# Patient Record
Sex: MALE | Race: WHITE | NOT HISPANIC OR LATINO | Employment: OTHER | URBAN - METROPOLITAN AREA
[De-identification: names, ages, dates, MRNs, and addresses within clinical notes are randomized per-mention and may not be internally consistent; named-entity substitution may affect disease eponyms.]

---

## 2017-01-06 ENCOUNTER — OFFICE VISIT (OUTPATIENT)
Dept: PODIATRY | Facility: CLINIC | Age: 62
End: 2017-01-06
Payer: COMMERCIAL

## 2017-01-06 VITALS
WEIGHT: 315 LBS | BODY MASS INDEX: 49.44 KG/M2 | DIASTOLIC BLOOD PRESSURE: 69 MMHG | HEART RATE: 81 BPM | SYSTOLIC BLOOD PRESSURE: 107 MMHG | HEIGHT: 67 IN

## 2017-01-06 DIAGNOSIS — M14.60 ARTHROPATHY ASSOCIATED WITH NEUROLOGICAL DISORDER: ICD-10-CM

## 2017-01-06 DIAGNOSIS — E11.49 TYPE II DIABETES MELLITUS WITH NEUROLOGICAL MANIFESTATIONS: Primary | ICD-10-CM

## 2017-01-06 DIAGNOSIS — I89.0 OTHER LYMPHEDEMA: ICD-10-CM

## 2017-01-06 DIAGNOSIS — B35.1 ONYCHOMYCOSIS DUE TO DERMATOPHYTE: ICD-10-CM

## 2017-01-06 DIAGNOSIS — B35.3 TINEA PEDIS, UNSPECIFIED LATERALITY: ICD-10-CM

## 2017-01-06 PROCEDURE — 1159F MED LIST DOCD IN RCRD: CPT | Mod: S$GLB,,, | Performed by: PODIATRIST

## 2017-01-06 PROCEDURE — 3074F SYST BP LT 130 MM HG: CPT | Mod: S$GLB,,, | Performed by: PODIATRIST

## 2017-01-06 PROCEDURE — 3046F HEMOGLOBIN A1C LEVEL >9.0%: CPT | Mod: S$GLB,,, | Performed by: PODIATRIST

## 2017-01-06 PROCEDURE — 3078F DIAST BP <80 MM HG: CPT | Mod: S$GLB,,, | Performed by: PODIATRIST

## 2017-01-06 PROCEDURE — 2022F DILAT RTA XM EVC RTNOPTHY: CPT | Mod: S$GLB,,, | Performed by: PODIATRIST

## 2017-01-06 PROCEDURE — 4010F ACE/ARB THERAPY RXD/TAKEN: CPT | Mod: S$GLB,,, | Performed by: PODIATRIST

## 2017-01-06 PROCEDURE — 99213 OFFICE O/P EST LOW 20 MIN: CPT | Mod: S$GLB,,, | Performed by: PODIATRIST

## 2017-01-06 PROCEDURE — 99999 PR PBB SHADOW E&M-EST. PATIENT-LVL III: CPT | Mod: PBBFAC,,, | Performed by: PODIATRIST

## 2017-01-06 NOTE — PATIENT INSTRUCTIONS
Athletes Foot    Athletes foot (tinea pedis) is caused by a fungal infection in the skin. It affects the skin between the toes, causing fissures (cracks in the skin). It can also affect the bottom of the foot where it causes dry white scales and peeling of the skin. This infection is more likely to occur when the foot is in hot, sweaty socks and shoes for long periods of time. You may feel itching and burning between your toes.  This infection is treated with skin creams or oral medicine.  Home care  The following are general care guidelines:  · It is important to keep the feet dry. Use absorbent cotton socks and change them if they become sweaty. Or, you can wear an open-toe shoe or sandal. Wash the feet at least once a day with soap and water.  · Apply the antifungal cream as prescribed. Some antifungal creams are available without a prescription.  · It may take a week before the rash starts to improve. It can take about 3 to 4 weeks to completely clear. Continue the medicine until the rash is all gone.  · Use over-the-counter antifungal powders or sprays on your feet after exposure to high-risk environments (public showers, gyms, and locker rooms). This can help prevent future infections. Wearing appropriate shoes in these situations can help.  Follow-up care  Follow up with your doctor as recommended by our staff if the rash does not improve after 10 days of treatment, or if the rash continues to spread.  Prevention  The following tips may help prevent athlete's foot:  · Don't share shoes or socks with someone who has athlete's foot.  · Don't walk barefoot in places where a fungal infection can spread quickly such as locker rooms, showers, and swimming pools.  · Change socks regularly.  · Alternate shoes to assist in drying.  When to seek medical care  Get prompt medical attention if any of the following occur:  · Increasing redness or swelling of the foot  · Pus draining from cracks in the skin  · Fever of  100.4ºF (38ºC) or higher, or as directed by your health care provider  · Infection comes back soon after treatment  © 2014-0142 [employer]. [address] [place] [address], [place] [address]. All rights reserved. This information is not intended as a substitute for professional medical care. Always follow your healthcare professional's instructions.

## 2017-01-06 NOTE — MR AVS SNAPSHOT
[facility] - Podiatry  [address]  [facility] [address]  Phone: [phone]                  Jian Arrieta   2017 11:15 AM   Office Visit    Description:  Male : 1955   Provider:  Vin Hidalgo DPM   Department:  [facility] - Podiatry           Reason for Visit     Diabetes Mellitus     Diabetic Foot Exam     Routine Foot Care     Peripheral Neuropathy           Diagnoses this Visit        Comments    Type II diabetes mellitus with neurological manifestations    -  Primary     Other lymphedema         Onychomycosis due to dermatophyte         Tinea pedis, unspecified laterality         Arthropathy associated with neurological disorder                To Do List           Goals (5 Years of Data)     None      [facility]     [facility] Care [facility] -  Assistance  Registered nurses in the [facility] Center provide clinical advisement, health education, appointment booking, and other advisory services.  Call for this free service at [phone].             Medications           Message regarding Medications     Verify the changes and/or additions to your medication regime listed below are the same as discussed with your clinician today.  If any of these changes or additions are incorrect, please notify your healthcare provider.             Verify that the below list of medications is an accurate representation of the medications you are currently taking.  If none reported, the list may be blank. If incorrect, please contact your healthcare provider. Carry this list with you in case of emergency.           Current Medications     aspirin 81 MG Chew Take 81 mg by mouth once daily.    atorvastatin (LIPITOR) 80 MG tablet Take 80 mg by mouth once daily.    calcium citrate-vitamin D3 500 mg calcium -400 unit Chew Take 1 tablet by mouth 3 (three) times daily.    clopidogrel (PLAVIX) 75 mg tablet Take 75 mg by mouth once daily.    cyanocobalamin, vitamin B-12, 500 mcg Subl  "Place 1 tablet under the tongue once daily.    ezetimibe (ZETIA) 10 mg tablet Take 10 mg by mouth once daily.    glimepiride (AMARYL) 4 MG tablet Take 4 mg by mouth 2 (two) times daily. Pt was advised to hold by Endocrinologist, Dr Jaime Boo at     metoprolol tartrate (LOPRESSOR) 50 MG tablet Take 50 mg by mouth 2 (two) times daily.    nystatin (MYCOSTATIN) cream Apply topically 3 (three) times daily.    ondansetron (ZOFRAN-ODT) 8 MG TbDL Take 1 tablet (8 mg total) by mouth every 6 (six) hours as needed (nausea/vomiting).    ONETOUCH ULTRA TEST Strp 4 (four) times daily. Use to test blood sugar four times a day.    pediatric multivit-iron-min Chew Take 1 tablet by mouth 2 (two) times daily.    polyethylene glycol (GLYCOLAX) 17 gram PwPk Take 1 g by mouth once daily.    ramipril (ALTACE) 2.5 MG capsule Take 2.5 mg by mouth once daily.    ursodiol (ESTEFANIA FORTE) 500 MG tablet Take 1 tablet (500 mg total) by mouth once daily. Crush tablet and take daily.  Okay to compound into liquid at patient's request.    omeprazole (PRILOSEC) 40 MG capsule Take 1 capsule (40 mg total) by mouth once daily.           Clinical Reference Information           Vital Signs - Last Recorded  Most recent update: 1/6/2017  1:06 PM by Elyse Carpio MA    BP Pulse Ht Wt BMI    107/69 81 5' 7" (1.702 m) (!) 148.7 kg (327 lb 12.8 oz) 51.34 kg/m2      Blood Pressure          Most Recent Value    BP  107/69      Allergies as of 1/6/2017     No Known Allergies      Immunizations Administered on Date of Encounter - 1/6/2017     None      Orders Placed During Today's Visit      Normal Orders This Visit    DIABETIC SHOES FOR HOME USE       Instructions      Athletes Foot    Athletes foot (tinea pedis) is caused by a fungal infection in the skin. It affects the skin between the toes, causing fissures (cracks in the skin). It can also affect the bottom of the foot where it causes dry white scales and peeling of the skin. This infection is more " likely to occur when the foot is in hot, sweaty socks and shoes for long periods of time. You may feel itching and burning between your toes.  This infection is treated with skin creams or oral medicine.  Home care  The following are general care guidelines:  · It is important to keep the feet dry. Use absorbent cotton socks and change them if they become sweaty. Or, you can wear an open-toe shoe or sandal. Wash the feet at least once a day with soap and water.  · Apply the antifungal cream as prescribed. Some antifungal creams are available without a prescription.  · It may take a week before the rash starts to improve. It can take about 3 to 4 weeks to completely clear. Continue the medicine until the rash is all gone.  · Use over-the-counter antifungal powders or sprays on your feet after exposure to high-risk environments (public showers, gyms, and locker rooms). This can help prevent future infections. Wearing appropriate shoes in these situations can help.  Follow-up care  Follow up with your doctor as recommended by our staff if the rash does not improve after 10 days of treatment, or if the rash continues to spread.  Prevention  The following tips may help prevent athlete's foot:  · Don't share shoes or socks with someone who has athlete's foot.  · Don't walk barefoot in places where a fungal infection can spread quickly such as locker rooms, showers, and swimming pools.  · Change socks regularly.  · Alternate shoes to assist in drying.  When to seek medical care  Get prompt medical attention if any of the following occur:  · Increasing redness or swelling of the foot  · Pus draining from cracks in the skin  · Fever of 100.4ºF (38ºC) or higher, or as directed by your health care provider  · Infection comes back soon after treatment  © 4166-7147 [employer]. [address] [place] [address], [place] [address]. All rights reserved. This information is not intended as a substitute for professional medical  care. Always follow your healthcare professional's instructions.

## 2017-01-06 NOTE — PROGRESS NOTES
Subjective:      Patient ID: Jian Arrieta is a 61 y.o. male.    Chief Complaint: Diabetes Mellitus (PCP Dr. Mahmood); Diabetic Foot Exam; Routine Foot Care; and Peripheral Neuropathy    Jian is a 61 y.o. male who presents to the clinic for evaluation and treatment of high risk feet. Jian has a past medical history of Cancer; Charcot foot due to diabetes mellitus; Chest pain; Colon polyps; Coronary artery disease; Diabetes mellitus; Hydrocele; Hyperlipidemia; and Perianal cyst. The patient's chief complaint is LE edema and athletes foot, right. Notes athletes foot is slowly improving with topical antifungals.  This patient has documented high risk feet requiring routine maintenance secondary to diabetes mellitis and those secondary complications of diabetes, as mentioned. Denies history of lower extremity wounds, infections and/or injury.      PCP: Samir Mahmood MD    Date Last Seen by PCP:   Chief Complaint   Patient presents with    Diabetes Mellitus     PCP Dr. Mahmood    Diabetic Foot Exam    Routine Foot Care    Peripheral Neuropathy        Current shoe gear:  Affected Foot: Extra depth shoes with custome accommodative insoles     Unaffected Foot: Extra depth shoes with custome accommodative insoles    Hemoglobin A1C   Date Value Ref Range Status   11/13/2015 6.7 (H) 4.5 - 6.2 % Final   09/30/2015 6.7 (H) 4.5 - 6.2 % Final   07/10/2015 8.3 (H) 4.5 - 6.2 % Final       Review of Systems   Constitution: Positive for weight loss (Had gastric sleeve ). Negative for chills and fever.   Cardiovascular: Negative for leg swelling.   Respiratory: Negative for shortness of breath.    Skin: Negative for poor wound healing.   Musculoskeletal: Positive for arthritis, joint pain and joint swelling.   Gastrointestinal: Negative for nausea and vomiting.   Neurological: Positive for numbness (in feet) and paresthesias (in feet).           Objective:      Physical Exam   Constitutional: He is oriented to person, place, and  time. He appears well-developed and well-nourished. No distress.   Cardiovascular: Normal rate and intact distal pulses.    Pulses:       Dorsalis pedis pulses are 2+ on the right side, and 2+ on the left side.        Posterior tibial pulses are 1+ on the right side, and 1+ on the left side.   Toes are cool to touch. Feet are warm proximally.There is decreased digital hair. Skin is atrophic, slightly hyperpigmented, and moderately edematous.     Musculoskeletal: Normal range of motion. He exhibits edema (lymphedema noted bilaterally). He exhibits no tenderness.        Right foot: Deformity: Charcot foot.   Charcot midfoot deformity R foot, fairly stable, slight rocker bottom, however rectus position of the right foot overall.    Feet:   Right Foot:   Protective Sensation: 10 sites tested. 4 sites sensed.   Skin Integrity: Negative for ulcer, blister or skin breakdown.   Left Foot:   Protective Sensation: 10 sites tested. 4 sites sensed.   Skin Integrity: Negative for ulcer or blister.   Neurological: He is alert and oriented to person, place, and time. He exhibits normal muscle tone.   Sharp/dull sensation absent Bilaterally. Light touch absent Bilaterally.     Skin: Skin is warm and dry. No rash noted. He is not diaphoretic. No erythema. No pallor.   Mild interdigital maceration, plantar scaling and xerosis, R>L.       Psychiatric: He has a normal mood and affect. His behavior is normal. Judgment and thought content normal.   Nursing note and vitals reviewed.            Assessment:       Encounter Diagnoses   Name Primary?    Type II diabetes mellitus with neurological manifestations Yes    Other lymphedema     Onychomycosis due to dermatophyte     Tinea pedis, unspecified laterality - Right Foot     Arthropathy associated with neurological disorder    This patient has documented high risk feet requiring routine maintenance secondary to diabetes mellitis and those secondary complications of diabetes, as  mentioned.        Plan:       Jian was seen today for diabetes mellitus, diabetic foot exam, routine foot care and peripheral neuropathy.    Diagnoses and all orders for this visit:    Type II diabetes mellitus with neurological manifestations  -     DIABETIC SHOES FOR HOME USE    Other lymphedema  -     DIABETIC SHOES FOR HOME USE    Onychomycosis due to dermatophyte  -     DIABETIC SHOES FOR HOME USE    Tinea pedis, unspecified laterality - Right Foot  -     DIABETIC SHOES FOR HOME USE    Arthropathy associated with neurological disorder  -     DIABETIC SHOES FOR HOME USE    - Patient was given written and verbal instructions regarding foot condition.  I counseled the patient on his conditions, their implications and medical management.    - Shoe inspection. Diabetic Foot Education. Patient reminded of the importance of good nutrition and blood sugar control to help prevent podiatric complications of diabetes. Patient instructed on proper foot hygeine. We discussed wearing proper shoe gear, daily foot inspections, never walking without protective shoe gear, never putting sharp instruments to feet      - Nystatin topical cream prescribed for treatment of aforementioned tinea pedis. Patient will use this medication as directed in addition to thourougly drying between toes daily, and applying powder as needed    RTC 6 months, sooner if any problems arise or if toe does not improve.

## 2017-03-13 ENCOUNTER — OFFICE VISIT (OUTPATIENT)
Dept: PODIATRY | Facility: CLINIC | Age: 62
End: 2017-03-13
Payer: COMMERCIAL

## 2017-03-13 ENCOUNTER — TELEPHONE (OUTPATIENT)
Dept: PODIATRY | Facility: CLINIC | Age: 62
End: 2017-03-13

## 2017-03-13 ENCOUNTER — HOSPITAL ENCOUNTER (EMERGENCY)
Facility: HOSPITAL | Age: 62
Discharge: HOME | End: 2017-03-13
Attending: EMERGENCY MEDICINE
Payer: COMMERCIAL

## 2017-03-13 VITALS
HEIGHT: 67 IN | WEIGHT: 315 LBS | DIASTOLIC BLOOD PRESSURE: 73 MMHG | SYSTOLIC BLOOD PRESSURE: 120 MMHG | BODY MASS INDEX: 49.44 KG/M2 | HEART RATE: 89 BPM

## 2017-03-13 VITALS
HEART RATE: 84 BPM | TEMPERATURE: 98 F | WEIGHT: 315 LBS | HEIGHT: 67 IN | DIASTOLIC BLOOD PRESSURE: 70 MMHG | OXYGEN SATURATION: 99 % | SYSTOLIC BLOOD PRESSURE: 155 MMHG | BODY MASS INDEX: 49.44 KG/M2 | RESPIRATION RATE: 17 BRPM

## 2017-03-13 DIAGNOSIS — L08.9 DIABETIC FOOT INFECTION: Primary | ICD-10-CM

## 2017-03-13 DIAGNOSIS — E11.628 DIABETIC FOOT INFECTION: Primary | ICD-10-CM

## 2017-03-13 DIAGNOSIS — T14.8XXA WOUND INFECTION: ICD-10-CM

## 2017-03-13 DIAGNOSIS — L03.119 CELLULITIS OF FOOT: ICD-10-CM

## 2017-03-13 DIAGNOSIS — L08.9 WOUND INFECTION: ICD-10-CM

## 2017-03-13 DIAGNOSIS — T14.8XXA PUNCTURE WOUND: ICD-10-CM

## 2017-03-13 DIAGNOSIS — S91.332A: ICD-10-CM

## 2017-03-13 DIAGNOSIS — L08.9 UNSPECIFIED LOCAL INFECTION OF SKIN AND SUBCUTANEOUS TISSUE: ICD-10-CM
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
## 2017-03-13 LAB
ANION GAP SERPL CALC-SCNC: 6 MMOL/L
BASOPHILS # BLD AUTO: 0.02 K/UL
BASOPHILS NFR BLD: 0.2 %
BUN SERPL-MCNC: 14 MG/DL
CALCIUM SERPL-MCNC: 9.5 MG/DL
CHLORIDE SERPL-SCNC: 104 MMOL/L
CO2 SERPL-SCNC: 31 MMOL/L
CREAT SERPL-MCNC: 1.1 MG/DL
CRP SERPL-MCNC: 76.7 MG/L
DIFFERENTIAL METHOD: ABNORMAL
EOSINOPHIL # BLD AUTO: 0.2 K/UL
EOSINOPHIL NFR BLD: 2.3 %
ERYTHROCYTE [DISTWIDTH] IN BLOOD BY AUTOMATED COUNT: 13.1 %
ERYTHROCYTE [SEDIMENTATION RATE] IN BLOOD BY WESTERGREN METHOD: 45 MM/HR
EST. GFR  (AFRICAN AMERICAN): >60 ML/MIN/1.73 M^2
EST. GFR  (NON AFRICAN AMERICAN): >60 ML/MIN/1.73 M^2
GLUCOSE SERPL-MCNC: 165 MG/DL
HCT VFR BLD AUTO: 40.6 %
HGB BLD-MCNC: 13.9 G/DL
LYMPHOCYTES # BLD AUTO: 2.1 K/UL
LYMPHOCYTES NFR BLD: 25.5 %
MCH RBC QN AUTO: 30.5 PG
MCHC RBC AUTO-ENTMCNC: 34.2 %
MCV RBC AUTO: 89 FL
MONOCYTES # BLD AUTO: 0.6 K/UL
MONOCYTES NFR BLD: 7 %
NEUTROPHILS # BLD AUTO: 5.3 K/UL
NEUTROPHILS NFR BLD: 65 %
PLATELET # BLD AUTO: 229 K/UL
PMV BLD AUTO: 10.4 FL
POTASSIUM SERPL-SCNC: 4.3 MMOL/L
RBC # BLD AUTO: 4.56 M/UL
SODIUM SERPL-SCNC: 141 MMOL/L
WBC # BLD AUTO: 8.13 K/UL

## 2017-03-13 PROCEDURE — 99284 EMERGENCY DEPT VISIT MOD MDM: CPT | Mod: 25

## 2017-03-13 PROCEDURE — 85651 RBC SED RATE NONAUTOMATED: CPT

## 2017-03-13 PROCEDURE — 87070 CULTURE OTHR SPECIMN AEROBIC: CPT

## 2017-03-13 PROCEDURE — 90471 IMMUNIZATION ADMIN: CPT | Performed by: EMERGENCY MEDICINE

## 2017-03-13 PROCEDURE — 63600175 PHARM REV CODE 636 W HCPCS: Performed by: EMERGENCY MEDICINE

## 2017-03-13 PROCEDURE — 99214 OFFICE O/P EST MOD 30 MIN: CPT | Mod: 25,S$GLB,, | Performed by: PODIATRIST

## 2017-03-13 PROCEDURE — 4010F ACE/ARB THERAPY RXD/TAKEN: CPT | Mod: S$GLB,,, | Performed by: PODIATRIST

## 2017-03-13 PROCEDURE — 86140 C-REACTIVE PROTEIN: CPT

## 2017-03-13 PROCEDURE — 96365 THER/PROPH/DIAG IV INF INIT: CPT

## 2017-03-13 PROCEDURE — 87075 CULTR BACTERIA EXCEPT BLOOD: CPT

## 2017-03-13 PROCEDURE — 99999 PR PBB SHADOW E&M-EST. PATIENT-LVL V: CPT | Mod: PBBFAC,,, | Performed by: PODIATRIST

## 2017-03-13 PROCEDURE — 3074F SYST BP LT 130 MM HG: CPT | Mod: S$GLB,,, | Performed by: PODIATRIST

## 2017-03-13 PROCEDURE — 25000003 PHARM REV CODE 250: Performed by: EMERGENCY MEDICINE

## 2017-03-13 PROCEDURE — 3046F HEMOGLOBIN A1C LEVEL >9.0%: CPT | Mod: S$GLB,,, | Performed by: PODIATRIST

## 2017-03-13 PROCEDURE — 2022F DILAT RTA XM EVC RTNOPTHY: CPT | Mod: S$GLB,,, | Performed by: PODIATRIST

## 2017-03-13 PROCEDURE — 85025 COMPLETE CBC W/AUTO DIFF WBC: CPT

## 2017-03-13 PROCEDURE — 96366 THER/PROPH/DIAG IV INF ADDON: CPT

## 2017-03-13 PROCEDURE — 99284 EMERGENCY DEPT VISIT MOD MDM: CPT | Mod: ,,, | Performed by: EMERGENCY MEDICINE

## 2017-03-13 PROCEDURE — 10060 I&D ABSCESS SIMPLE/SINGLE: CPT | Mod: S$GLB,,, | Performed by: PODIATRIST

## 2017-03-13 PROCEDURE — 80048 BASIC METABOLIC PNL TOTAL CA: CPT

## 2017-03-13 PROCEDURE — 3078F DIAST BP <80 MM HG: CPT | Mod: S$GLB,,, | Performed by: PODIATRIST

## 2017-03-13 PROCEDURE — 90715 TDAP VACCINE 7 YRS/> IM: CPT | Performed by: EMERGENCY MEDICINE

## 2017-03-13 PROCEDURE — 1160F RVW MEDS BY RX/DR IN RCRD: CPT | Mod: S$GLB,,, | Performed by: PODIATRIST

## 2017-03-13 RX ORDER — HYDROCODONE BITARTRATE AND ACETAMINOPHEN 5; 325 MG/1; MG/1
1 TABLET ORAL EVERY 6 HOURS PRN
Status: ON HOLD | COMMUNITY
End: 2019-01-28

## 2017-03-13 RX ORDER — CIPROFLOXACIN 500 MG/1
500 TABLET ORAL 2 TIMES DAILY
COMMUNITY
End: 2017-04-02

## 2017-03-13 RX ORDER — SULFAMETHOXAZOLE AND TRIMETHOPRIM 800; 160 MG/1; MG/1
1 TABLET ORAL 2 TIMES DAILY
Qty: 14 TABLET | Refills: 0 | Status: SHIPPED | OUTPATIENT
Start: 2017-03-13 | End: 2017-03-20

## 2017-03-13 RX ADMIN — VANCOMYCIN HYDROCHLORIDE 2000 MG: 100 INJECTION, POWDER, LYOPHILIZED, FOR SOLUTION INTRAVENOUS at 01:03

## 2017-03-13 RX ADMIN — CLOSTRIDIUM TETANI TOXOID ANTIGEN (FORMALDEHYDE INACTIVATED), CORYNEBACTERIUM DIPHTHERIAE TOXOID ANTIGEN (FORMALDEHYDE INACTIVATED), BORDETELLA PERTUSSIS TOXOID ANTIGEN (GLUTARALDEHYDE INACTIVATED), BORDETELLA PERTUSSIS FILAMENTOUS HEMAGGLUTININ ANTIGEN (FORMALDEHYDE INACTIVATED), BORDETELLA PERTUSSIS PERTACTIN ANTIGEN, AND BORDETELLA PERTUSSIS FIMBRIAE 2/3 ANTIGEN 0.5 ML: 5; 2; 2.5; 5; 3; 5 INJECTION, SUSPENSION INTRAMUSCULAR at 03:03

## 2017-03-13 NOTE — DISCHARGE INSTRUCTIONS
Wound Check, Infection  You have a wound that has become infected. The wound will not heal properly unless the infection is cleared. Infection in a wound may also spread if it is not treated. In most cases, antibiotic medicines are prescribed to treat a wound infection.   Symptoms of a wound infection include:  · Redness or swelling around the wound  · Warmth coming from the wound  · New or worsening pain  · Red streaks around the wound  · Draining pus  · Fever  Home care  Follow all directions you are given to treat the infection.  Medicines  Take all medicines as prescribed.   · If you were given antibiotics, take them until they are gone or your healthcare provider tells you to stop. It is vital to finish the antibiotics even if you feel better. If you do not finish them, the infection may come back and be harder to treat.  · If your infection is not responding to the medicines you are taking, you may be prescribed new medicines.  · Take medicine for pain as directed by your healthcare provider.  Wound care  Care for your wound as directed by your healthcare provider.  · Apply a warm compress (clean cloth soaked in hot water) to the infected area for about 5 to 10 minutes at a time. Be very careful not to burn yourself. Test the cloth on a non-infected area to make sure it is not too hot.  · Continue to change the dressing daily. If it becomes wet, stained with wound fluid, or dirty, change it sooner. To change it:  ¨ Wash your hands with soap and water before changing the dressing.  ¨ Carefully remove the dressing and tape. If it sticks to the wound, you may need to wet it a little to remove it. (Do not do this if your healthcare provider has told you not to.)  ¨ Gently clean the wound with clean water (or saline) using gauze, a clean washcloth, or cotton swab.  ¨ Do not use soap, alcohol, peroxide or other cleansers.  ¨ If you were told to dry the wound before putting on a new dressing, gently pat. Do not  rub.  ¨ Throw out the old dressing.  ¨ Wash your hands again before opening the new, clean dressing.  ¨ Wash your hands again when you are done.  Follow-up care  Follow up with your healthcare provider as advised. If a culture was done, you will be notified if your treatment needs to change. Call as directed for the results.  When to seek medical advice  Call your health care provider right away if any of these occur:  · Symptoms of infection don't start to improve within 2 days of starting antibiotics  · Symptoms of infection get worse  · New symptoms, such as red streaks around the wound  · Fever of 100.4°F (38.0°C) or higher for more than 2 days after starting the antibiotics  Date Last Reviewed: 8/10/2015  © 3118-9094 [employer]. [address] [place] [address], [place] [address]. All rights reserved. This information is not intended as a substitute for professional medical care. Always follow your healthcare professional's instructions.

## 2017-03-13 NOTE — TELEPHONE ENCOUNTER
----- Message from Mitzi Gatica sent at 3/10/2017  2:36 PM CST -----  Contact: PT  Would like to schedule an appointment, for a puncture wound in left foot. He is a diabetic patient, and states he is very concerned.    He would like to see Dr. Hidalgo, the next appointment was 3/20/17, he need to be seen sooner. He would like to come in 3/13/17, if possible.     Call: [phone]

## 2017-03-13 NOTE — ED PROVIDER NOTES
"Encounter Date: 3/13/2017    SCRIBE #1 NOTE: I, Annmarie Schmidt, am scribing for, and in the presence of, Dr. Badillo.       History     Chief Complaint   Patient presents with    Wound Infection     stepped on nail friday, seen in podiatry and sent here for iv antibiotics     Review of patient's allergies indicates:  No Known Allergies  HPI Comments: Time seen by provider: 12:53 PM    This is a 61 y.o. Male with a history of hyperlipidemia, diabetes, and charcot foot due to diabetes who presents with complaint of wound infection in left foot for 3 days after stepping on a carpet nail.  The patient indicates that his physician recently "drained" the wound.  The patient also indicates that he had a spot of skin cancer removed on his right ear 4 days ago.    The history is provided by the patient.     Past Medical History:   Diagnosis Date    Cancer     skin cancer    Charcot foot due to diabetes mellitus     Charcot foot due to diabetes mellitus     Chest pain     Colon polyps     approx 5 yrs ago    Coronary artery disease     Diabetes mellitus     Hydrocele     approx 1.5 yrs ago    Hyperlipidemia     Lymphedema     Perianal cyst     approx 2 yrs ago     Past Surgical History:   Procedure Laterality Date    ANGIOPLASTY      total x5 stents    CARDIAC SURGERY      COLONOSCOPY N/A 10/6/2015    Procedure: COLONOSCOPY;  Surgeon: Shekhar Richards MD;  Location: [facility] ([address] [place] [address]);  Service: Endoscopy;  Laterality: N/A;  BMI over 55/2nd floor case    5 day hold Plavix, Dr Kwadwo Arroyo    CYST REMOVAL      GASTRECTOMY      KNEE ARTHROSCOPY      perianal surgery      perianal cyst removed     Family History   Problem Relation Age of Onset    Cancer Mother     Cancer Father     Obesity Sister     Parkinsonism Brother     No Known Problems Paternal Grandmother     Cancer Paternal Grandfather     Cancer Brother     Diabetes Maternal Grandmother     Stroke Maternal Grandfather      Social History "   Substance Use Topics    Smoking status: Former Smoker     Packs/day: 2.00     Years: 30.00     Types: Cigarettes     Quit date: 2/1/2005    Smokeless tobacco: Never Used    Alcohol use 0.0 - 0.6 oz/week     0 - 1 Shots of liquor per week     Review of Systems   Constitutional: Negative for fever.   HENT: Negative for sore throat.    Eyes: Negative for visual disturbance.   Respiratory: Negative for shortness of breath.    Cardiovascular: Negative for chest pain.   Gastrointestinal: Negative for abdominal pain.   Genitourinary: Negative for dysuria.   Musculoskeletal: Negative for back pain.   Skin: Positive for wound (Wound infection on left foot.).        Left foot and right ear bandaged.   Neurological: Negative for headaches.       Physical Exam   Initial Vitals   BP Pulse Resp Temp SpO2   03/13/17 1133 03/13/17 1133 03/13/17 1133 03/13/17 1133 03/13/17 1133   141/91 84 18 97.5 °F (36.4 °C) 98 %     Physical Exam    Nursing note and vitals reviewed.  Constitutional: He appears well-developed and well-nourished. He is not diaphoretic. No distress.   The patient appears non-toxic.   HENT:   Head: Normocephalic and atraumatic.   Mouth/Throat: Oropharynx is clear and moist.   Eyes: Conjunctivae and EOM are normal. Pupils are equal, round, and reactive to light.   Neck: Normal range of motion. Neck supple. No JVD present.   Cardiovascular: Normal rate, regular rhythm and normal heart sounds.   No murmur heard.  Pulmonary/Chest: Breath sounds normal. No respiratory distress. He has no wheezes. He has no rhonchi. He has no rales.   Abdominal: Soft. Bowel sounds are normal. He exhibits no distension and no mass. There is no tenderness. There is no rebound and no guarding.   Musculoskeletal: Normal range of motion. He exhibits edema (The patient has chronic edema to legs.). He exhibits no tenderness.   Extremities are atraumatic.   Neurological: He is alert and oriented to person, place, and time. No cranial nerve  deficit or sensory deficit.   Skin: Skin is warm and dry. No rash noted.   Right ear and left foot are bandaged.   Psychiatric: He has a normal mood and affect.         ED Course   Procedures  Labs Reviewed   SEDIMENTATION RATE, MANUAL - Abnormal; Notable for the following:        Result Value    Sed Rate 45 (*)     All other components within normal limits   C-REACTIVE PROTEIN - Abnormal; Notable for the following:     CRP 76.7 (*)     All other components within normal limits   CBC W/ AUTO DIFFERENTIAL - Abnormal; Notable for the following:     RBC 4.56 (*)     Hemoglobin 13.9 (*)     All other components within normal limits   BASIC METABOLIC PANEL - Abnormal; Notable for the following:     CO2 31 (*)     Glucose 165 (*)     Anion Gap 6 (*)     All other components within normal limits             Medical Decision Making:   History:   Old Medical Records: I decided to obtain old medical records.  Old Records Summarized: other records.       <> Summary of Records: The patient was evaluated in Podiatry clinic with a puncture wound to his foot on a carpet nail last Friday.  He has been on Cipro.   Initial Assessment:   Will obtain X-ray and labs, will give a dose of Vancomycin, and will discuss with Podiatry after results.  Clinical Tests:   Lab Tests: Ordered and Reviewed  Radiological Study: Ordered and Reviewed  Other:   I have discussed this case with another health care provider.            Scribe Attestation:   Scribe #1: I performed the above scribed service and the documentation accurately describes the services I performed. I attest to the accuracy of the note.    Attending Attestation:           Physician Attestation for Scribe:  Physician Attestation Statement for Scribe #1: I, Dr. Badillo, reviewed documentation, as scribed by Annmarie Schmidt in my presence, and it is both accurate and complete.         Attending ED Notes:   4:46 PM  I discussed with Dr. Hidalgo with Podiatry who reviewed charts and  recommends discharge home on Bactrim.           ED Course     Clinical Impression:   The encounter diagnosis was Wound infection.    Disposition:   Disposition: Discharged  Condition: Stable       Aakash Badillo MD  03/15/17 1929       Aakash Badillo MD  03/15/17 1929

## 2017-03-13 NOTE — PATIENT INSTRUCTIONS
Puncture Wound: Foot       A puncture wound occurs when a pointed object (such as a nail) pushes into the skin. It may go into the tissues below the skin of the foot, including fat and muscle. This type of wound is narrow and deep. They can be hard to clean. Puncture wounds are at high risk for becoming infected. One type of serious infection is more likely if you were wearing a rubber-soled shoe at the time of injury. Bacteria from the sole of the shoe may be dragged into the wound. Symptoms of infection may appear as late as 2 to 3 weeks after the injury. Be sure to watch for symptoms of infection and call your healthcare provider right away if any them appear.  X-rays may be done to see whether any objects remain under the skin. Your may also need a tetanus shot. This is given if you are not up to date on this vaccination and the object that caused the wound may lead to tetanus.  Puncture wounds can easily become infected.   Home care  · When you sit or lie down, raise the foot above the level of your heart. This helps reduce swelling and pain.  · Do not put weight on the injured foot if it hurts to do so or if you were told to keep weight off the injury.  · Your healthcare provider may prescribe an antibiotic. This is to help prevent infection. Follow all instructions for taking this medicine. Take the medicine every day until it is gone or you are told to stop. You should not have any left over.  · The healthcare provider may prescribe medicines for pain. Follow instructions for taking them.  · You can take acetaminophen or ibuprofen for pain, unless you were given a different pain medicine to use.   · Follow the healthcare providers instructions on how to care for the wound.  · Keep the wound clean and dry. Do not get the wound wet until you are told it is okay to do so. If the area gets wet, gently pat it dry with a clean cloth. Replace the wet bandage with a dry one.  · If a bandage was applied and it  becomes wet or dirty, replace it. Otherwise, leave it in place for the first 24 hours.  · Once you can get the wound wet, you may shower as usual but do not soak the wound in water (no tub baths or swimming)  · Check the wound daily for symptoms of infection. These include:  ¨ Increasing redness or swelling around the wound  ¨ Increased warmth of the wound  ¨ Worsening pain  ¨ Red streaking lines away from the wound  ¨ Draining pus  Follow-up care  Follow up with your healthcare provider as advised.   When to seek medical advice  Call your healthcare provider right away if any of these occur:  · Any symptoms of infection (listed above)  · Fever of 100.4°F (38.ºC) or higher, or as directed by your healthcare provider  · Wound changes colors  · Numbness around the wound  · Decreased movement around the injured area  Date Last Reviewed: 8/24/2015  © 6813-0720 [employer]. [address] [place] [address]. All rights reserved. This information is not intended as a substitute for professional medical care. Always follow your healthcare professional's instructions.        Wound Check, Infection  You have a wound that has become infected. The wound will not heal properly unless the infection is cleared. Infection in a wound may also spread if it is not treated. In most cases, antibiotic medicines are prescribed to treat a wound infection.   Symptoms of a wound infection include:  · Redness or swelling around the wound  · Warmth coming from the wound  · New or worsening pain  · Red streaks around the wound  · Draining pus  · Fever  Home care  Follow all directions you are given to treat the infection.  Medicines  Take all medicines as prescribed.   · If you were given antibiotics, take them until they are gone or your healthcare provider tells you to stop. It is vital to finish the antibiotics even if you feel better. If you do not finish them, the infection may come back and be harder to treat.  · If  your infection is not responding to the medicines you are taking, you may be prescribed new medicines.  · Take medicine for pain as directed by your healthcare provider.  Wound care  Care for your wound as directed by your healthcare provider.  · Apply a warm compress (clean cloth soaked in hot water) to the infected area for about 5 to 10 minutes at a time. Be very careful not to burn yourself. Test the cloth on a non-infected area to make sure it is not too hot.  · Continue to change the dressing daily. If it becomes wet, stained with wound fluid, or dirty, change it sooner. To change it:  ¨ Wash your hands with soap and water before changing the dressing.  ¨ Carefully remove the dressing and tape. If it sticks to the wound, you may need to wet it a little to remove it. (Do not do this if your healthcare provider has told you not to.)  ¨ Gently clean the wound with clean water (or saline) using gauze, a clean washcloth, or cotton swab.  ¨ Do not use soap, alcohol, peroxide or other cleansers.  ¨ If you were told to dry the wound before putting on a new dressing, gently pat. Do not rub.  ¨ Throw out the old dressing.  ¨ Wash your hands again before opening the new, clean dressing.  ¨ Wash your hands again when you are done.  Follow-up care  Follow up with your healthcare provider as advised. If a culture was done, you will be notified if your treatment needs to change. Call as directed for the results.  When to seek medical advice  Call your health care provider right away if any of these occur:  · Symptoms of infection don't start to improve within 2 days of starting antibiotics  · Symptoms of infection get worse  · New symptoms, such as red streaks around the wound  · Fever of 100.4°F (38.0°C) or higher for more than 2 days after starting the antibiotics  Date Last Reviewed: 8/10/2015  © 3276-9054 [employer]. [address] [place] [address], [place] [address]. All rights reserved. This information is not  intended as a substitute for professional medical care. Always follow your healthcare professional's instructions.        Discharge Instructions: Packing a Wound  Your healthcare provider wants you to care for a special dressing, or packing, in your wound. When a wound is deep, or when it tunnels under the skin, packing the wound can help it heal. The packing material soaks up any drainage from the wound, which helps the tissues heal from the inside out. Without the packing, the wound could close at the top. This would trap fluid and possibly bacteria in the deeper areas of the wound and lead to infection and impede healing. You were shown how to pack your wound before you left the hospital. The following guidelines will help you remember how to take care of your wound.  Gather your supplies  Keep your supplies all in 1 place. Put them in a basket or large bag. You will need the following:  · Packing material  · Sterile wetting solution  · Sterile gloves  · A clean bowl  · Scissors  · A clean towel  · Outer dressing material (a bandage to put on the top of the wound after you have packed it)  · Tape  · Cotton swabs  · A small plastic bag  Clean up  · Clean the area where you will set out your dressing supplies.  · Wash your hands thoroughly with soap and water.  · Put a clean towel over the area and set a clean bowl on it. Dont touch the inside of the bowl.  Prepare the packing material  · Pour enough wetting solution into the clean bowl to wet the packing material.  · Cut off a length of packing material and drop it carefully into the bowl of wetting solution. (Remember, the amount of packing material needed to fill the wound should become less and less as the area heals. You will need to adjust the length of the packing material over time.)  · Cut pieces of tape to desired lengths. You will use these strips to secure your outer dressing. For now, hang the pieces of tape on the edge of your work surface.  · Gently  remove your existing bandage (old tape, outer dressing, and packing). Put these items in a small plastic bag for disposal.  Pack the wound  · Wash your hands thoroughly again. Use soap and water.  · Put on the gloves. Gently squeeze the packing material to get rid of excess wetting solution. The packing material should be wet, but not dripping.  · Gently put the packing material into the wound. Packing should fill the wound space completely, but not tightly. Use a cotton swab to gently guide the packing into small or tunneled areas.  · Open your outer dressing material and place it on the towel. Keep it away from the bowl, and dont get it wet.  · Put the outer dressing over the packing and wound site.  · Tape the outer dressing in place.  · Remove your gloves.  · Wash your hands 1 more time with soap and water.  Follow-up  Make a follow-up appointment. It is recommended that you be seen within 48 hours after the first time a packing is placed.     When to call your healthcare provider  Call your healthcare provider right away if you have any of the following:  · Increased drainage from the wound  · Redness in or around the wound  · Wound tissue that changes from pink to white, yellow, or black in color  · Odor coming from the wound  · Increased size or depth of the wound  · Fever above 100.4°F  (38°C) or shaking chills   Date Last Reviewed: 8/6/2015  © 8352-8816 [employer]. [address] [place] [address], [place] [address]. All rights reserved. This information is not intended as a substitute for professional medical care. Always follow your healthcare professional's instructions.

## 2017-03-13 NOTE — ED TRIAGE NOTES
"Pt states he stepped on a nail on Friday, has been trying to go to the doctor, but has been unable to. He also recently had ear surgery he states "is not getting any bernardo." Ear +pain, hotness. No pain in foot (neuropathy).  "

## 2017-03-13 NOTE — ED AVS SNAPSHOT
[facility]  [address] [facility] [address]               Jian Arrieta   3/13/2017 12:43 PM   ED    Description:  Male : 1955   Department:  [facility]           Your Care was Coordinated By:     Provider Role From To    Aakash Badillo MD Attending Provider 17 1247 --      Reason for Visit     Wound Infection           Diagnoses this Visit        Comments    Wound infection           ED Disposition     None           To Do List           Follow-up Information     Follow up with Vin Hidalgo DPM In 1 week.    Specialty:  Podiatry    Contact information:    [address] [facility] [address]  [phone]          Follow up with [facility].    Specialty:  Emergency Medicine    Why:  If symptoms worsen    Contact information:    [facility] [facility] [address] [phone]       These Medications        Disp Refills Start End    sulfamethoxazole-trimethoprim 800-160mg (BACTRIM DS) 800-160 mg Tab 14 tablet 0 3/13/2017 3/20/2017    Take 1 tablet by mouth 2 (two) times daily. - Oral    Pharmacy: [facility]/pharmacy #[id number]  [address] #: [phone]         [facility]     [facility] - 24/7 Assistance  Registered nurses in the [facility] provide clinical advisement, health education, appointment booking, and other advisory services.  Call for this free service at [phone].             Medications           Message regarding Medications     Verify the changes and/or additions to your medication regime listed below are the same as discussed with your clinician today.  If any of these changes or additions are incorrect, please notify your healthcare provider.        START taking these NEW medications        Refills    sulfamethoxazole-trimethoprim 800-160mg (BACTRIM DS) 800-160 mg Tab 0    Sig: Take 1 tablet by mouth 2 (two)  times daily.    Class: Print    Route: Oral      These medications were administered today        Dose Freq    vancomycin (VANCOCIN) 2,000 mg in dextrose 5 % 500 mL IVPB 15 mg/kg × 148 kg ED 1 Time    Sig: Inject 2,000 mg into the vein ED 1 Time.    Class: Normal    Route: Intravenous    Tdap vaccine injection 0.5 mL 0.5 mL Once    Sig: Inject 0.5 mLs into the muscle once.    Class: Normal    Route: Intramuscular           Verify that the below list of medications is an accurate representation of the medications you are currently taking.  If none reported, the list may be blank. If incorrect, please contact your healthcare provider. Carry this list with you in case of emergency.           Current Medications     aspirin 81 MG Chew Take 81 mg by mouth once daily.    atorvastatin (LIPITOR) 80 MG tablet Take 80 mg by mouth once daily.    calcium citrate-vitamin D3 500 mg calcium -400 unit Chew Take 1 tablet by mouth 3 (three) times daily.    ciprofloxacin HCl (CIPRO) 500 MG tablet Take 500 mg by mouth 2 (two) times daily.    clopidogrel (PLAVIX) 75 mg tablet Take 75 mg by mouth once daily.    cyanocobalamin, vitamin B-12, 500 mcg Subl Place 1 tablet under the tongue once daily.    ezetimibe (ZETIA) 10 mg tablet Take 10 mg by mouth once daily.    glimepiride (AMARYL) 4 MG tablet Take 4 mg by mouth 2 (two) times daily. Pt was advised to hold by Endocrinologist, Dr Jaime Boo at     hydrocodone-acetaminophen 5-325mg (NORCO) 5-325 mg per tablet Take 1 tablet by mouth every 6 (six) hours as needed for Pain.    metoprolol tartrate (LOPRESSOR) 50 MG tablet Take 50 mg by mouth 2 (two) times daily.    pediatric multivit-iron-min Chew Take 1 tablet by mouth 2 (two) times daily.    polyethylene glycol (GLYCOLAX) 17 gram PwPk Take 1 g by mouth once daily.    ramipril (ALTACE) 2.5 MG capsule Take 2.5 mg by mouth once daily.    nystatin (MYCOSTATIN) cream Apply topically 3 (three) times daily.    omeprazole (PRILOSEC) 40 MG  "capsule Take 1 capsule (40 mg total) by mouth once daily.    ondansetron (ZOFRAN-ODT) 8 MG TbDL Take 1 tablet (8 mg total) by mouth every 6 (six) hours as needed (nausea/vomiting).    ONETOUCH ULTRA TEST Strp 4 (four) times daily. Use to test blood sugar four times a day.    sulfamethoxazole-trimethoprim 800-160mg (BACTRIM DS) 800-160 mg Tab Take 1 tablet by mouth 2 (two) times daily.    ursodiol (ESTEFANIA FORTE) 500 MG tablet Take 1 tablet (500 mg total) by mouth once daily. Crush tablet and take daily.  Okay to compound into liquid at patient's request.           Clinical Reference Information           Your Vitals Were     BP Pulse Temp Resp Height Weight    141/91 84 97.5 °F (36.4 °C) (Oral) 18 5' 7" (1.702 m) 148 kg (326 lb 4.5 oz)    SpO2 BMI             98% 51.1 kg/m2         Allergies as of 3/13/2017     No Known Allergies      Immunizations Administered on Date of Encounter - 3/13/2017     Name Date Dose VIS Date Route    TDAP 3/13/2017 0.5 mL 2/24/2015 Intramuscular      ED Micro, Lab, POCT     Start Ordered       Status Ordering Provider    03/13/17 1301 03/13/17 1301  Sedimentation rate, manual  STAT      Final result     03/13/17 1301 03/13/17 1301  C-reactive protein  STAT      Final result     03/13/17 1301 03/13/17 1301  CBC auto differential  STAT      Edited Result - FINAL     03/13/17 1301 03/13/17 1301  Basic metabolic panel  STAT      Final result       ED Imaging Orders     Start Ordered       Status Ordering Provider    03/13/17 1301 03/13/17 1301  X-Ray Foot Complete Left  1 time imaging      Final result         Discharge Instructions         Wound Check, Infection  You have a wound that has become infected. The wound will not heal properly unless the infection is cleared. Infection in a wound may also spread if it is not treated. In most cases, antibiotic medicines are prescribed to treat a wound infection.   Symptoms of a wound infection include:  · Redness or swelling around the " wound  · Warmth coming from the wound  · New or worsening pain  · Red streaks around the wound  · Draining pus  · Fever  Home care  Follow all directions you are given to treat the infection.  Medicines  Take all medicines as prescribed.   · If you were given antibiotics, take them until they are gone or your healthcare provider tells you to stop. It is vital to finish the antibiotics even if you feel better. If you do not finish them, the infection may come back and be harder to treat.  · If your infection is not responding to the medicines you are taking, you may be prescribed new medicines.  · Take medicine for pain as directed by your healthcare provider.  Wound care  Care for your wound as directed by your healthcare provider.  · Apply a warm compress (clean cloth soaked in hot water) to the infected area for about 5 to 10 minutes at a time. Be very careful not to burn yourself. Test the cloth on a non-infected area to make sure it is not too hot.  · Continue to change the dressing daily. If it becomes wet, stained with wound fluid, or dirty, change it sooner. To change it:  ¨ Wash your hands with soap and water before changing the dressing.  ¨ Carefully remove the dressing and tape. If it sticks to the wound, you may need to wet it a little to remove it. (Do not do this if your healthcare provider has told you not to.)  ¨ Gently clean the wound with clean water (or saline) using gauze, a clean washcloth, or cotton swab.  ¨ Do not use soap, alcohol, peroxide or other cleansers.  ¨ If you were told to dry the wound before putting on a new dressing, gently pat. Do not rub.  ¨ Throw out the old dressing.  ¨ Wash your hands again before opening the new, clean dressing.  ¨ Wash your hands again when you are done.  Follow-up care  Follow up with your healthcare provider as advised. If a culture was done, you will be notified if your treatment needs to change. Call as directed for the results.  When to seek medical  advice  Call your health care provider right away if any of these occur:  · Symptoms of infection don't start to improve within 2 days of starting antibiotics  · Symptoms of infection get worse  · New symptoms, such as red streaks around the wound  · Fever of 100.4°F (38.0°C) or higher for more than 2 days after starting the antibiotics  Date Last Reviewed: 8/10/2015  © 5753-5261 [employer]. [address] [place] [address], [place] [address]. All rights reserved. This information is not intended as a substitute for professional medical care. Always follow your healthcare professional's instructions.           [facility] complies with applicable Federal civil rights laws and does not discriminate on the basis of race, color, national origin, age, disability, or sex.        Language Assistance Services     ATTENTION: Language assistance services are available, free of charge. Please call [phone].      ATENCIÓN: Si habla español, tiene a galvez disposición servicios gratuitos de asistencia lingüística. Llame al [phone].     CHÚ Ý: N?u b?n nói Ti?ng Vi?t, có các d?ch v? h? tr? ngôn ng? mi?n phí dành cho b?n. G?i s? [phone].

## 2017-03-13 NOTE — PROGRESS NOTES
Subjective:      Patient ID: Jian Arrieta is a 61 y.o. male.    Chief Complaint: Diabetes Mellitus; Foot Ulcer (left ft.); and Foot Problem    Jian is a 61 y.o. male who presents to the clinic for evaluation and treatment of high risk feet. Jian has a past medical history of Cancer; Charcot foot due to diabetes mellitus; Chest pain; Colon polyps; Coronary artery disease; Diabetes mellitus; Hydrocele; Hyperlipidemia; and Perianal cyst.    This patient has documented high risk feet requiring routine maintenance secondary to diabetes mellitis and those secondary complications of diabetes, as mentioned. The patient's chief complaint is puncture wound with nail that occurred last Thursday. Small nail was attached to some carpet in his home. He is taking cipro PO per derm. Does not remember last tetanus.     PCP: Samir Mahmood MD    Date Last Seen by PCP:   Chief Complaint   Patient presents with    Diabetes Mellitus    Foot Ulcer     left ft.    Foot Problem        Current shoe gear:  Affected Foot: Extra depth shoes with custome accommodative insoles     Unaffected Foot: Extra depth shoes with custome accommodative insoles    Hemoglobin A1C   Date Value Ref Range Status   11/13/2015 6.7 (H) 4.5 - 6.2 % Final   09/30/2015 6.7 (H) 4.5 - 6.2 % Final   07/10/2015 8.3 (H) 4.5 - 6.2 % Final       Review of Systems   Constitution: Positive for weight loss (Had gastric sleeve ). Negative for chills and fever.   Cardiovascular: Negative for leg swelling.   Respiratory: Negative for shortness of breath.    Skin: Negative for poor wound healing.   Musculoskeletal: Positive for arthritis, joint pain and joint swelling.   Gastrointestinal: Negative for nausea and vomiting.   Neurological: Positive for numbness (in feet) and paresthesias (in feet).           Objective:      Physical Exam   Constitutional: He is oriented to person, place, and time. He appears well-developed and well-nourished. No distress.    Cardiovascular: Normal rate and intact distal pulses.    Pulses:       Dorsalis pedis pulses are 2+ on the right side, and 2+ on the left side.        Posterior tibial pulses are 1+ on the right side, and 1+ on the left side.   Toes are cool to touch. Feet are warm proximally.There is decreased digital hair. Skin is atrophic, slightly hyperpigmented, and moderately edematous.     Musculoskeletal: Normal range of motion. He exhibits edema (lymphedema noted bilaterally). He exhibits no tenderness.        Right foot: Deformity: Charcot foot.   Charcot midfoot deformity R foot, fairly stable, slight rocker bottom, however rectus position of the right foot overall.    Feet:   Right Foot:   Protective Sensation: 10 sites tested. 4 sites sensed.   Skin Integrity: Negative for ulcer, blister or skin breakdown.   Left Foot:   Protective Sensation: 10 sites tested. 4 sites sensed.   Skin Integrity: Negative for ulcer or blister.   Neurological: He is alert and oriented to person, place, and time. He exhibits normal muscle tone.   Sharp/dull sensation absent Bilaterally. Light touch absent Bilaterally.     Skin: Skin is warm and dry. No rash noted. He is not diaphoretic. No erythema. No pallor.   Puncture wound at left plantar 2nd MTPJ, probes to joint capsule.    Serous drainage  Erythema and edema to midfoot.       Psychiatric: He has a normal mood and affect. His behavior is normal. Judgment and thought content normal.   Nursing note and vitals reviewed.            Assessment:       Encounter Diagnoses   Name Primary?    Diabetic foot infection - Left Foot Yes    Puncture wound - Left Foot     Cellulitis of foot - Left Foot    This patient has documented high risk feet requiring routine maintenance secondary to diabetes mellitis and those secondary complications of diabetes, as mentioned.        Plan:       Jian was seen today for diabetes mellitus, foot ulcer and foot problem.    Diagnoses and all orders for this  visit:    Diabetic foot infection - Left Foot  -     Aerobic culture  -     Culture, Anaerobic  -     X-Ray Foot Complete Left; Future    Puncture wound - Left Foot  -     Aerobic culture  -     Culture, Anaerobic  -     X-Ray Foot Complete Left; Future    Cellulitis of foot - Left Foot    - Patient was given written and verbal instructions regarding foot condition.  I counseled the patient on his conditions, their implications and medical management.    - Patient agrees to report to ED for tetanus, IV abx, blood work (ESR, CRP, CBC), xray.  - Understands he may need to be admitted for IV antibiotics and possible surgical I&D if there is any signs of bone infection or abscess upon further exam.    - Local I&D performed. No purulence noted. Wound irrigated and packed open. Offload with surgical shoe.      - Shoe inspection. Diabetic Foot Education. Patient reminded of the importance of good nutrition and blood sugar control to help prevent podiatric complications of diabetes. Patient instructed on proper foot hygeine. We discussed wearing proper shoe gear, daily foot inspections, never walking without protective shoe gear, never putting sharp instruments to feet

## 2017-03-13 NOTE — PROCEDURES
"Incision and Drainage  Date/Time: 3/13/2017 11:06 AM  Performed by: JUANA NAVARRETE  Authorized by: JUANA NAVARRETE     Time out: Immediately prior to procedure a "time out" was called to verify the correct patient, procedure, equipment, support staff and site/side marked as required.    Consent Done?:  Yes (Verbal)    Type:  Abscess  Body area:  Lower extremity  Location details:  Left foot  Anesthesia method: none, patient is neuropathic.  Scalpel size:  15  Incision type:  Elliptical  Complexity:  Simple  Drainage:  Serous and bloody  Drainage amount:  Scant  Wound treatment:  Wound packed  Packing material:  1/2 in iodoform gauze  Patient tolerance:  Patient tolerated the procedure well with no immediate complications      "

## 2017-03-15 LAB — BACTERIA SPEC AEROBE CULT: NORMAL

## 2017-03-16 ENCOUNTER — TELEPHONE (OUTPATIENT)
Dept: PODIATRY | Facility: CLINIC | Age: 62
End: 2017-03-16

## 2017-03-16 NOTE — TELEPHONE ENCOUNTER
----- Message from Shon Bey sent at 3/16/2017 11:45 AM CDT -----  Contact: self  Pt has a lt foot puncture wound and would like to be seen today. Pt can be reached at [phone].

## 2017-03-16 NOTE — TELEPHONE ENCOUNTER
Patient request to be seen today because of wound on his foot, saw Dr. Hidalgo and was brought to the ER that day

## 2017-03-17 ENCOUNTER — OFFICE VISIT (OUTPATIENT)
Dept: PODIATRY | Facility: CLINIC | Age: 62
End: 2017-03-17
Payer: COMMERCIAL

## 2017-03-17 VITALS
HEART RATE: 73 BPM | SYSTOLIC BLOOD PRESSURE: 123 MMHG | WEIGHT: 315 LBS | DIASTOLIC BLOOD PRESSURE: 72 MMHG | HEIGHT: 67 IN | BODY MASS INDEX: 49.44 KG/M2

## 2017-03-17 DIAGNOSIS — L97.529 DIABETIC ULCER OF LEFT FOOT ASSOCIATED WITH TYPE 2 DIABETES MELLITUS: ICD-10-CM

## 2017-03-17 DIAGNOSIS — T14.8XXA PUNCTURE WOUND: Primary | ICD-10-CM

## 2017-03-17 DIAGNOSIS — E11.621 DIABETIC ULCER OF LEFT FOOT ASSOCIATED WITH TYPE 2 DIABETES MELLITUS: ICD-10-CM

## 2017-03-17 LAB — BACTERIA SPEC ANAEROBE CULT: NORMAL

## 2017-03-17 PROCEDURE — 87070 CULTURE OTHR SPECIMN AEROBIC: CPT

## 2017-03-17 PROCEDURE — 3078F DIAST BP <80 MM HG: CPT | Mod: S$GLB,,, | Performed by: PODIATRIST

## 2017-03-17 PROCEDURE — 2022F DILAT RTA XM EVC RTNOPTHY: CPT | Mod: S$GLB,,, | Performed by: PODIATRIST

## 2017-03-17 PROCEDURE — 99999 PR PBB SHADOW E&M-EST. PATIENT-LVL III: CPT | Mod: PBBFAC,,, | Performed by: PODIATRIST

## 2017-03-17 PROCEDURE — 99024 POSTOP FOLLOW-UP VISIT: CPT | Mod: S$GLB,,, | Performed by: PODIATRIST

## 2017-03-17 PROCEDURE — 4010F ACE/ARB THERAPY RXD/TAKEN: CPT | Mod: S$GLB,,, | Performed by: PODIATRIST

## 2017-03-17 PROCEDURE — 3074F SYST BP LT 130 MM HG: CPT | Mod: S$GLB,,, | Performed by: PODIATRIST

## 2017-03-17 PROCEDURE — 3046F HEMOGLOBIN A1C LEVEL >9.0%: CPT | Mod: S$GLB,,, | Performed by: PODIATRIST

## 2017-03-17 NOTE — PROGRESS NOTES
Subjective:      Patient ID: Jian Arrieta is a 61 y.o. male.    Chief Complaint: Diabetic Foot Exam (PCP Dr. Mahmood) and Foot Problem (left ft. wound)    Jian is a 61 y.o. male who presents to the clinic for evaluation and treatment of high risk feet. Jian has a past medical history of Cancer; Charcot foot due to diabetes mellitus; Charcot foot due to diabetes mellitus; Chest pain; Colon polyps; Coronary artery disease; Diabetes mellitus; Hydrocele; Hyperlipidemia; Lymphedema; and Perianal cyst.    This patient has documented high risk feet requiring routine maintenance secondary to diabetes mellitis and those secondary complications of diabetes, as mentioned. The patient's chief complaint is wound at left foot. He did get tetanus and IV abx in ED on Monday. Taking PO abx without complaint.    PCP: Samir Mahmood MD    Date Last Seen by PCP:   Chief Complaint   Patient presents with    Diabetic Foot Exam     PCP Dr. Mahmood    Foot Problem     left ft. wound        Current shoe gear:  Affected Foot: Extra depth shoes with custome accommodative insoles     Unaffected Foot: Extra depth shoes with custome accommodative insoles    Hemoglobin A1C   Date Value Ref Range Status   11/13/2015 6.7 (H) 4.5 - 6.2 % Final   09/30/2015 6.7 (H) 4.5 - 6.2 % Final   07/10/2015 8.3 (H) 4.5 - 6.2 % Final       Review of Systems   Constitution: Positive for weight loss (Had gastric sleeve ). Negative for chills and fever.   Cardiovascular: Negative for leg swelling.   Respiratory: Negative for shortness of breath.    Skin: Negative for poor wound healing.   Musculoskeletal: Positive for arthritis, joint pain and joint swelling.   Gastrointestinal: Negative for nausea and vomiting.   Neurological: Positive for numbness (in feet) and paresthesias (in feet).           Objective:      Physical Exam   Constitutional: He is oriented to person, place, and time. He appears well-developed and well-nourished. No distress.   Cardiovascular:  Normal rate and intact distal pulses.    Pulses:       Dorsalis pedis pulses are 2+ on the right side, and 2+ on the left side.        Posterior tibial pulses are 1+ on the right side, and 1+ on the left side.   Toes are cool to touch. Feet are warm proximally.There is decreased digital hair. Skin is atrophic, slightly hyperpigmented, and moderately edematous.     Musculoskeletal: Normal range of motion. He exhibits edema (lymphedema noted bilaterally). He exhibits no tenderness.        Right foot: Deformity: Charcot foot.   Charcot midfoot deformity R foot, fairly stable, slight rocker bottom, however rectus position of the right foot overall.    Feet:   Right Foot:   Protective Sensation: 10 sites tested. 4 sites sensed.   Skin Integrity: Negative for ulcer, blister or skin breakdown.   Left Foot:   Protective Sensation: 10 sites tested. 4 sites sensed.   Skin Integrity: Negative for ulcer or blister.   Neurological: He is alert and oriented to person, place, and time. He exhibits normal muscle tone.   Sharp/dull sensation absent Bilaterally. Light touch absent Bilaterally.     Skin: Skin is warm and dry. No rash noted. He is not diaphoretic. No pallor.   Puncture wound at left plantar 2nd MTPJ, probes subcutaneous tissue.    No drainage, erythema or edema.       Psychiatric: He has a normal mood and affect. His behavior is normal. Judgment and thought content normal.   Nursing note and vitals reviewed.    Cultures: no growth.     Lab Results   Component Value Date    SEDRATE 45 (H) 03/13/2017     Lab Results   Component Value Date    CRP 76.7 (H) 03/13/2017     Lab Results   Component Value Date    WBC 8.13 03/13/2017    HGB 13.9 (L) 03/13/2017    HCT 40.6 03/13/2017    MCV 89 03/13/2017     03/13/2017     Xray, 3/13/17: No evidence of fracture or dislocation.  Spurring of the inferior and posterior calcaneus.  Joint spaces are satisfactorily maintained.  Soft tissue structures demonstrate no significant  abnormalities.  No radiopaque foreign body.        Assessment:       Encounter Diagnoses   Name Primary?    Puncture wound - Left Foot Yes    Diabetic ulcer of left foot associated with type 2 diabetes mellitus    This patient has documented high risk feet requiring routine maintenance secondary to diabetes mellitis and those secondary complications of diabetes, as mentioned.        Plan:       Jian was seen today for diabetic foot exam and foot problem.    Diagnoses and all orders for this visit:    Puncture wound - Left Foot  -     Ambulatory referral to Home Health    Diabetic ulcer of left foot associated with type 2 diabetes mellitus  -     Aerobic culture  -     Ambulatory referral to Home Health    - Patient was given written and verbal instructions regarding foot condition.  I counseled the patient on his conditions, their implications and medical management.    - Wound debridement. No purulence noted. Wound irrigated and packed open. Offload with surgical shoe.  - Home health to change packing M/W. He will f/u next Friday.  - Continue with cipro/bactrim.    - Obtained verbal consent from the patient for excisional wound debridement, left plantar foot. No anesthesia was required, Utilizing a sterile curet, all non-viable soft tissue was excised to level of health subcutaneous tissue. A healthy appearing wound base was achieved with minimal blood loss. Hemostasis achieved with compression. Wound site was irrigated with normal saline and dressed. Wound care instructions were reviewed with the patient. Patient tolerated the procedure well.      - Shoe inspection. Diabetic Foot Education. Patient reminded of the importance of good nutrition and blood sugar control to help prevent podiatric complications of diabetes. Patient instructed on proper foot hygeine. We discussed wearing proper shoe gear, daily foot inspections, never walking without protective shoe gear, never putting sharp instruments to feet

## 2017-03-20 LAB — BACTERIA SPEC AEROBE CULT: NORMAL

## 2017-03-24 ENCOUNTER — OFFICE VISIT (OUTPATIENT)
Dept: PODIATRY | Facility: CLINIC | Age: 62
End: 2017-03-24
Payer: COMMERCIAL

## 2017-03-24 VITALS
WEIGHT: 315 LBS | DIASTOLIC BLOOD PRESSURE: 78 MMHG | BODY MASS INDEX: 49.44 KG/M2 | SYSTOLIC BLOOD PRESSURE: 114 MMHG | HEIGHT: 67 IN | HEART RATE: 88 BPM

## 2017-03-24 DIAGNOSIS — E11.621 DIABETIC ULCER OF LEFT FOOT ASSOCIATED WITH TYPE 2 DIABETES MELLITUS: ICD-10-CM

## 2017-03-24 DIAGNOSIS — L97.529 DIABETIC ULCER OF LEFT FOOT ASSOCIATED WITH TYPE 2 DIABETES MELLITUS: ICD-10-CM

## 2017-03-24 DIAGNOSIS — T14.8XXA PUNCTURE WOUND: Primary | ICD-10-CM

## 2017-03-24 PROCEDURE — 87070 CULTURE OTHR SPECIMN AEROBIC: CPT

## 2017-03-24 PROCEDURE — 2022F DILAT RTA XM EVC RTNOPTHY: CPT | Mod: S$GLB,,, | Performed by: PODIATRIST

## 2017-03-24 PROCEDURE — 3074F SYST BP LT 130 MM HG: CPT | Mod: S$GLB,,, | Performed by: PODIATRIST

## 2017-03-24 PROCEDURE — 4010F ACE/ARB THERAPY RXD/TAKEN: CPT | Mod: S$GLB,,, | Performed by: PODIATRIST

## 2017-03-24 PROCEDURE — 3046F HEMOGLOBIN A1C LEVEL >9.0%: CPT | Mod: S$GLB,,, | Performed by: PODIATRIST

## 2017-03-24 PROCEDURE — 3078F DIAST BP <80 MM HG: CPT | Mod: S$GLB,,, | Performed by: PODIATRIST

## 2017-03-24 PROCEDURE — 99999 PR PBB SHADOW E&M-EST. PATIENT-LVL IV: CPT | Mod: PBBFAC,,, | Performed by: PODIATRIST

## 2017-03-24 PROCEDURE — 11042 DBRDMT SUBQ TIS 1ST 20SQCM/<: CPT | Mod: S$GLB,,, | Performed by: PODIATRIST

## 2017-03-24 PROCEDURE — 99212 OFFICE O/P EST SF 10 MIN: CPT | Mod: 25,S$GLB,, | Performed by: PODIATRIST

## 2017-03-24 PROCEDURE — 1160F RVW MEDS BY RX/DR IN RCRD: CPT | Mod: S$GLB,,, | Performed by: PODIATRIST

## 2017-03-25 NOTE — PROGRESS NOTES
Subjective:      Patient ID: Jian Arrieta is a 61 y.o. male.    Chief Complaint: Puncture wound - Left Foot (bilateral); Diabetes Mellitus; Foot Ulcer; and Follow-up    Jian is a 61 y.o. male who presents to the clinic for evaluation and treatment of high risk feet. Jian has a past medical history of Cancer; Charcot foot due to diabetes mellitus; Charcot foot due to diabetes mellitus; Chest pain; Colon polyps; Coronary artery disease; Diabetes mellitus; Hydrocele; Hyperlipidemia; Lymphedema; and Perianal cyst.    This patient has documented high risk feet requiring routine maintenance secondary to diabetes mellitis and those secondary complications of diabetes, as mentioned. The patient's chief complaint is wound at left foot. Taking PO abx without complaint. Home health is changing dressing as prescribed. No new concerns.     PCP: Samir Mahmood MD    Date Last Seen by PCP:   Chief Complaint   Patient presents with    Puncture wound - Left Foot     bilateral    Diabetes Mellitus    Foot Ulcer    Follow-up        Current shoe gear:  Affected Foot: Extra depth shoes with custome accommodative insoles     Unaffected Foot: Extra depth shoes with custome accommodative insoles    Hemoglobin A1C   Date Value Ref Range Status   11/13/2015 6.7 (H) 4.5 - 6.2 % Final   09/30/2015 6.7 (H) 4.5 - 6.2 % Final   07/10/2015 8.3 (H) 4.5 - 6.2 % Final       Review of Systems   Constitution: Positive for weight loss (Had gastric sleeve ). Negative for chills and fever.   Cardiovascular: Negative for leg swelling.   Respiratory: Negative for shortness of breath.    Skin: Negative for poor wound healing.   Musculoskeletal: Positive for arthritis, joint pain and joint swelling.   Gastrointestinal: Negative for nausea and vomiting.   Neurological: Positive for numbness (in feet) and paresthesias (in feet).           Objective:      Physical Exam   Constitutional: He is oriented to person, place, and time. He appears  well-developed and well-nourished. No distress.   Cardiovascular: Normal rate and intact distal pulses.    Pulses:       Dorsalis pedis pulses are 2+ on the right side, and 2+ on the left side.        Posterior tibial pulses are 1+ on the right side, and 1+ on the left side.   Toes are cool to touch. Feet are warm proximally.There is decreased digital hair. Skin is atrophic, slightly hyperpigmented, and moderately edematous.     Musculoskeletal: Normal range of motion. He exhibits edema (lymphedema noted bilaterally). He exhibits no tenderness.        Right foot: Deformity: Charcot foot.   Charcot midfoot deformity R foot, fairly stable, slight rocker bottom, however rectus position of the right foot overall.    Feet:   Right Foot:   Protective Sensation: 10 sites tested. 4 sites sensed.   Skin Integrity: Negative for ulcer, blister or skin breakdown.   Left Foot:   Protective Sensation: 10 sites tested. 4 sites sensed.   Skin Integrity: Negative for ulcer or blister.   Neurological: He is alert and oriented to person, place, and time. He exhibits normal muscle tone.   Sharp/dull sensation absent Bilaterally. Light touch absent Bilaterally.     Skin: Skin is warm and dry. No rash noted. He is not diaphoretic. No pallor.   Puncture wound at left plantar 2nd MTPJ, probes subcutaneous tissue. No bone or tendon palpated.    No drainage, erythema or edema.       Psychiatric: He has a normal mood and affect. His behavior is normal. Judgment and thought content normal.   Nursing note and vitals reviewed.    Cultures: no growth.     Lab Results   Component Value Date    SEDRATE 45 (H) 03/13/2017     Lab Results   Component Value Date    CRP 76.7 (H) 03/13/2017     Lab Results   Component Value Date    WBC 8.13 03/13/2017    HGB 13.9 (L) 03/13/2017    HCT 40.6 03/13/2017    MCV 89 03/13/2017     03/13/2017     Xray, 3/13/17: No evidence of fracture or dislocation.  Spurring of the inferior and posterior  calcaneus.  Joint spaces are satisfactorily maintained.  Soft tissue structures demonstrate no significant abnormalities.  No radiopaque foreign body.    Culture 3/13 and 3/17: no growth, skin mohsen only        Assessment:       Encounter Diagnoses   Name Primary?    Puncture wound - Left Foot Yes    Diabetic ulcer of left foot associated with type 2 diabetes mellitus    This patient has documented high risk feet requiring routine maintenance secondary to diabetes mellitis and those secondary complications of diabetes, as mentioned.        Plan:       Jian was seen today for puncture wound - left foot, diabetes mellitus, foot ulcer and follow-up.    Diagnoses and all orders for this visit:    Puncture wound - Left Foot  -     SUBSEQUENT HOME HEALTH ORDERS    Diabetic ulcer of left foot associated with type 2 diabetes mellitus  -     Sedimentation rate, manual; Future  -     C-reactive protein; Future  -     Aerobic culture  -     SUBSEQUENT HOME HEALTH ORDERS    - Patient was given written and verbal instructions regarding foot condition.  I counseled the patient on his conditions, their implications and medical management.    - Wound debridement. No purulence noted. Wound irrigated and packed open. Offload with surgical shoe. Will consider CAM boot or TCC which he decline today.  - Home health to change packing M/W/F.  - He will f/u 2 weeks or sooner as needed    - Obtained verbal consent from the patient for excisional wound debridement, left plantar foot. No anesthesia was required, Utilizing a sterile curet, all non-viable soft tissue was excised to level of health subcutaneous tissue. A healthy appearing wound base was achieved with minimal blood loss. Hemostasis achieved with compression. Wound site was irrigated with normal saline, cultured and dressed. Wound care instructions were reviewed with the patient. Patient tolerated the procedure well.      - Shoe inspection. Diabetic Foot Education. Patient  reminded of the importance of good nutrition and blood sugar control to help prevent podiatric complications of diabetes. Patient instructed on proper foot hygeine. We discussed wearing proper shoe gear, daily foot inspections, never walking without protective shoe gear, never putting sharp instruments to feet

## 2017-03-27 ENCOUNTER — TELEPHONE (OUTPATIENT)
Dept: PODIATRY | Facility: CLINIC | Age: 62
End: 2017-03-27

## 2017-03-27 LAB — BACTERIA SPEC AEROBE CULT: NORMAL

## 2017-03-27 NOTE — TELEPHONE ENCOUNTER
----- Message from Kayleigh Rayo sent at 3/24/2017  4:21 PM CDT -----  [facility] [phone]  She is calling to verify the pt wound care order.

## 2017-03-31 ENCOUNTER — HOSPITAL ENCOUNTER (OUTPATIENT)
Dept: RADIOLOGY | Facility: HOSPITAL | Age: 62
Discharge: HOME | End: 2017-03-31
Attending: PODIATRIST
Payer: COMMERCIAL

## 2017-03-31 ENCOUNTER — OFFICE VISIT (OUTPATIENT)
Dept: PODIATRY | Facility: CLINIC | Age: 62
End: 2017-03-31
Payer: COMMERCIAL

## 2017-03-31 ENCOUNTER — TELEPHONE (OUTPATIENT)
Dept: PODIATRY | Facility: CLINIC | Age: 62
End: 2017-03-31

## 2017-03-31 VITALS
HEIGHT: 67 IN | BODY MASS INDEX: 49.44 KG/M2 | HEART RATE: 82 BPM | WEIGHT: 315 LBS | DIASTOLIC BLOOD PRESSURE: 66 MMHG | SYSTOLIC BLOOD PRESSURE: 103 MMHG

## 2017-03-31 DIAGNOSIS — E11.621 DIABETIC ULCER OF LEFT FOOT ASSOCIATED WITH TYPE 2 DIABETES MELLITUS: ICD-10-CM

## 2017-03-31 DIAGNOSIS — E11.621 DIABETIC ULCER OF TOE OF LEFT FOOT ASSOCIATED WITH TYPE 2 DIABETES MELLITUS, WITH FAT LAYER EXPOSED: ICD-10-CM

## 2017-03-31 DIAGNOSIS — L97.522 DIABETIC ULCER OF TOE OF LEFT FOOT ASSOCIATED WITH TYPE 2 DIABETES MELLITUS, WITH FAT LAYER EXPOSED: ICD-10-CM

## 2017-03-31 DIAGNOSIS — T14.8XXA PUNCTURE WOUND: Primary | ICD-10-CM

## 2017-03-31 DIAGNOSIS — L97.529 DIABETIC ULCER OF LEFT FOOT ASSOCIATED WITH TYPE 2 DIABETES MELLITUS: ICD-10-CM

## 2017-03-31 PROCEDURE — 87075 CULTR BACTERIA EXCEPT BLOOD: CPT

## 2017-03-31 PROCEDURE — 99213 OFFICE O/P EST LOW 20 MIN: CPT | Mod: 25,S$GLB,, | Performed by: PODIATRIST

## 2017-03-31 PROCEDURE — 87077 CULTURE AEROBIC IDENTIFY: CPT

## 2017-03-31 PROCEDURE — 87186 SC STD MICRODIL/AGAR DIL: CPT | Mod: 59

## 2017-03-31 PROCEDURE — 3074F SYST BP LT 130 MM HG: CPT | Mod: S$GLB,,, | Performed by: PODIATRIST

## 2017-03-31 PROCEDURE — 87070 CULTURE OTHR SPECIMN AEROBIC: CPT

## 2017-03-31 PROCEDURE — 4010F ACE/ARB THERAPY RXD/TAKEN: CPT | Mod: S$GLB,,, | Performed by: PODIATRIST

## 2017-03-31 PROCEDURE — 87102 FUNGUS ISOLATION CULTURE: CPT

## 2017-03-31 PROCEDURE — 87015 SPECIMEN INFECT AGNT CONCNTJ: CPT

## 2017-03-31 PROCEDURE — 87116 MYCOBACTERIA CULTURE: CPT

## 2017-03-31 PROCEDURE — 1160F RVW MEDS BY RX/DR IN RCRD: CPT | Mod: S$GLB,,, | Performed by: PODIATRIST

## 2017-03-31 PROCEDURE — 3078F DIAST BP <80 MM HG: CPT | Mod: S$GLB,,, | Performed by: PODIATRIST

## 2017-03-31 PROCEDURE — 73630 X-RAY EXAM OF FOOT: CPT | Mod: 26,LT,, | Performed by: RADIOLOGY

## 2017-03-31 PROCEDURE — 73630 X-RAY EXAM OF FOOT: CPT | Mod: TC,LT

## 2017-03-31 PROCEDURE — 11042 DBRDMT SUBQ TIS 1ST 20SQCM/<: CPT | Mod: S$GLB,,, | Performed by: PODIATRIST

## 2017-03-31 PROCEDURE — 3046F HEMOGLOBIN A1C LEVEL >9.0%: CPT | Mod: S$GLB,,, | Performed by: PODIATRIST

## 2017-03-31 PROCEDURE — 99999 PR PBB SHADOW E&M-EST. PATIENT-LVL III: CPT | Mod: PBBFAC,,, | Performed by: PODIATRIST

## 2017-03-31 NOTE — TELEPHONE ENCOUNTER
----- Message from Natalie Sanchez sent at 3/31/2017  8:15 AM CDT -----  Contact: Sara- [facility]   Sara stated that underneath the dressing of the is wet and the skin is starting to peel. She's worried that this will create another wound and would like to know if patient should be seen today. She can be reached at  [phone]

## 2017-04-03 NOTE — PROGRESS NOTES
Subjective:      Patient ID: Jian Arrieta is a 61 y.o. male.    Chief Complaint: Foot Ulcer (left ft./ PCP Dr. Mahmood) and Follow-up    Jian is a 61 y.o. male who presents to the clinic for evaluation and treatment of high risk feet. Jian has a past medical history of Cancer; Charcot foot due to diabetes mellitus; Charcot foot due to diabetes mellitus; Chest pain; Colon polyps; Coronary artery disease; Diabetes mellitus; Hydrocele; Hyperlipidemia; Lymphedema; and Perianal cyst.    This patient has documented high risk feet requiring routine maintenance secondary to diabetes mellitis and those secondary complications of diabetes, as mentioned. The patient's chief complaint is wound at left foot. Completed PO abx without complaint. Home health is changing dressing as prescribed.    PCP: Samir Mahmood MD    Date Last Seen by PCP:   Chief Complaint   Patient presents with    Foot Ulcer     left ft./ PCP Dr. Mahmood    Follow-up        Current shoe gear:  Affected Foot: Extra depth shoes with custome accommodative insoles     Unaffected Foot: Extra depth shoes with custome accommodative insoles    Hemoglobin A1C   Date Value Ref Range Status   11/13/2015 6.7 (H) 4.5 - 6.2 % Final   09/30/2015 6.7 (H) 4.5 - 6.2 % Final   07/10/2015 8.3 (H) 4.5 - 6.2 % Final       Review of Systems   Constitution: Positive for weight loss (Had gastric sleeve ). Negative for chills and fever.   Cardiovascular: Negative for leg swelling.   Respiratory: Negative for shortness of breath.    Skin: Negative for poor wound healing.   Musculoskeletal: Positive for arthritis, joint pain and joint swelling.   Gastrointestinal: Negative for nausea and vomiting.   Neurological: Positive for numbness (in feet) and paresthesias (in feet).           Objective:      Physical Exam   Constitutional: He is oriented to person, place, and time. He appears well-developed and well-nourished. No distress.   Cardiovascular: Normal rate and intact distal  pulses.    Pulses:       Dorsalis pedis pulses are 2+ on the right side, and 2+ on the left side.        Posterior tibial pulses are 1+ on the right side, and 1+ on the left side.   Toes are cool to touch. Feet are warm proximally.There is decreased digital hair. Skin is atrophic, slightly hyperpigmented, and moderately edematous.     Musculoskeletal: Normal range of motion. He exhibits edema (lymphedema noted bilaterally). He exhibits no tenderness.        Right foot: Deformity: Charcot foot.   Charcot midfoot deformity R foot, fairly stable, slight rocker bottom, however rectus position of the right foot overall.    Feet:   Right Foot:   Protective Sensation: 10 sites tested. 4 sites sensed.   Skin Integrity: Negative for ulcer, blister or skin breakdown.   Left Foot:   Protective Sensation: 10 sites tested. 4 sites sensed.   Skin Integrity: Negative for ulcer or blister.   Neurological: He is alert and oriented to person, place, and time. He exhibits normal muscle tone.   Sharp/dull sensation absent Bilaterally. Light touch absent Bilaterally.     Skin: Skin is warm and dry. No rash noted. He is not diaphoretic. No pallor.   Puncture wound at left plantar 2nd MTPJ, probes subcutaneous tissue. No bone or tendon palpated. Appears to be filling in with decreased depth and diameter noted: 2 x 2 x 5 mm.    No drainage, erythema or edema. Mild periwound xerosis, scaling.        Psychiatric: He has a normal mood and affect. His behavior is normal. Judgment and thought content normal.   Nursing note and vitals reviewed.    Cultures: no growth.     Lab Results   Component Value Date    SEDRATE 45 (H) 03/13/2017     Lab Results   Component Value Date    CRP 76.7 (H) 03/13/2017     Lab Results   Component Value Date    WBC 8.13 03/13/2017    HGB 13.9 (L) 03/13/2017    HCT 40.6 03/13/2017    MCV 89 03/13/2017     03/13/2017     Xray, 3/13/17: No evidence of fracture or dislocation.  Spurring of the inferior and  posterior calcaneus.  Joint spaces are satisfactorily maintained.  Soft tissue structures demonstrate no significant abnormalities.  No radiopaque foreign body.    Culture 3/13 and 3/17: no growth, skin mohsen only        Assessment:       Encounter Diagnoses   Name Primary?    Puncture wound - Left Foot Yes    Diabetic ulcer of left foot associated with type 2 diabetes mellitus    This patient has documented high risk feet requiring routine maintenance secondary to diabetes mellitis and those secondary complications of diabetes, as mentioned.        Plan:       Jian was seen today for foot ulcer and follow-up.    Diagnoses and all orders for this visit:    Puncture wound - Left Foot    Diabetic ulcer of left foot associated with type 2 diabetes mellitus  -     Aerobic culture  -     Culture, Anaerobic  -     X-Ray Foot Complete Left; Future  -     Fungus culture  -     AFB CULTURE & SMEAR    - Patient was given written and verbal instructions regarding foot condition.  I counseled the patient on his conditions, their implications and medical management.    - Wound debridement. No purulence noted. Wound irrigated, cultured and packed open. Offload with surgical shoe. Will consider CAM boot or TCC which he decline today.  - Home health to change packing M/W/F.  - He will f/u next week.     - Obtained verbal consent from the patient for excisional wound debridement, left plantar foot. No anesthesia was required, Utilizing a sterile curet, all non-viable soft tissue was excised to level of health subcutaneous tissue. A healthy appearing wound base was achieved with minimal blood loss. Hemostasis achieved with compression. Wound site was irrigated with normal saline, cultured and dressed. Wound care instructions were reviewed with the patient. Patient tolerated the procedure well.      - Shoe inspection. Diabetic Foot Education. Patient reminded of the importance of good nutrition and blood sugar control to help  prevent podiatric complications of diabetes. Patient instructed on proper foot hygeine. We discussed wearing proper shoe gear, daily foot inspections, never walking without protective shoe gear, never putting sharp instruments to feet

## 2017-04-04 LAB
BACTERIA SPEC AEROBE CULT: NORMAL
BACTERIA SPEC AEROBE CULT: NORMAL
BACTERIA SPEC ANAEROBE CULT: NORMAL

## 2017-04-10 ENCOUNTER — OFFICE VISIT (OUTPATIENT)
Dept: PODIATRY | Facility: CLINIC | Age: 62
End: 2017-04-10
Payer: COMMERCIAL

## 2017-04-10 VITALS
HEIGHT: 67 IN | DIASTOLIC BLOOD PRESSURE: 81 MMHG | WEIGHT: 315 LBS | SYSTOLIC BLOOD PRESSURE: 124 MMHG | HEART RATE: 63 BPM | BODY MASS INDEX: 49.44 KG/M2

## 2017-04-10 DIAGNOSIS — Z22.322 MRSA (METHICILLIN RESISTANT STAPH AUREUS) CULTURE POSITIVE: ICD-10-CM

## 2017-04-10 DIAGNOSIS — L97.522 DIABETIC ULCER OF TOE OF LEFT FOOT ASSOCIATED WITH TYPE 2 DIABETES MELLITUS, WITH FAT LAYER EXPOSED: Primary | ICD-10-CM

## 2017-04-10 DIAGNOSIS — E11.621 DIABETIC ULCER OF TOE OF LEFT FOOT ASSOCIATED WITH TYPE 2 DIABETES MELLITUS, WITH FAT LAYER EXPOSED: Primary | ICD-10-CM

## 2017-04-10 PROCEDURE — 3079F DIAST BP 80-89 MM HG: CPT | Mod: S$GLB,,, | Performed by: PODIATRIST

## 2017-04-10 PROCEDURE — 11042 DBRDMT SUBQ TIS 1ST 20SQCM/<: CPT | Mod: S$GLB,,, | Performed by: PODIATRIST

## 2017-04-10 PROCEDURE — 99999 PR PBB SHADOW E&M-EST. PATIENT-LVL III: CPT | Mod: PBBFAC,,, | Performed by: PODIATRIST

## 2017-04-10 PROCEDURE — 1160F RVW MEDS BY RX/DR IN RCRD: CPT | Mod: S$GLB,,, | Performed by: PODIATRIST

## 2017-04-10 PROCEDURE — 3074F SYST BP LT 130 MM HG: CPT | Mod: S$GLB,,, | Performed by: PODIATRIST

## 2017-04-10 PROCEDURE — 99213 OFFICE O/P EST LOW 20 MIN: CPT | Mod: 25,S$GLB,, | Performed by: PODIATRIST

## 2017-04-10 PROCEDURE — 3046F HEMOGLOBIN A1C LEVEL >9.0%: CPT | Mod: S$GLB,,, | Performed by: PODIATRIST

## 2017-04-10 PROCEDURE — 4010F ACE/ARB THERAPY RXD/TAKEN: CPT | Mod: S$GLB,,, | Performed by: PODIATRIST

## 2017-04-10 RX ORDER — DOXYCYCLINE 100 MG/1
100 CAPSULE ORAL 2 TIMES DAILY
Qty: 20 CAPSULE | Refills: 0 | Status: SHIPPED | OUTPATIENT
Start: 2017-04-10 | End: 2017-04-24 | Stop reason: SDUPTHER

## 2017-04-10 RX ORDER — SULFAMETHOXAZOLE AND TRIMETHOPRIM 800; 160 MG/1; MG/1
1 TABLET ORAL 2 TIMES DAILY
Qty: 20 TABLET | Refills: 0 | Status: SHIPPED | OUTPATIENT
Start: 2017-04-10 | End: 2017-04-20

## 2017-04-10 NOTE — PROGRESS NOTES
Subjective:      Patient ID: Jian Arrieta is a 61 y.o. male.    Chief Complaint: Foot Injury (left ft. big toe/ PCP Dr. Mahmood); Foot Ulcer; and Follow-up    Jian is a 61 y.o. male who presents to the clinic for evaluation and treatment of high risk feet. Jian has a past medical history of Cancer; Charcot foot due to diabetes mellitus; Charcot foot due to diabetes mellitus; Chest pain; Colon polyps; Coronary artery disease; Diabetes mellitus; Hydrocele; Hyperlipidemia; Lymphedema; and Perianal cyst.    This patient has documented high risk feet requiring routine maintenance secondary to diabetes mellitis and those secondary complications of diabetes, as mentioned. The patient's chief complaint is wound at left foot. Home health is changing dressing as prescribed.    PCP: Samir Mahmood MD    Date Last Seen by PCP:   Chief Complaint   Patient presents with    Foot Injury     left ft. big toe/ PCP Dr. Mahmood    Foot Ulcer    Follow-up        Current shoe gear:  Affected Foot: Extra depth shoes with custome accommodative insoles     Unaffected Foot: Extra depth shoes with custome accommodative insoles    Hemoglobin A1C   Date Value Ref Range Status   11/13/2015 6.7 (H) 4.5 - 6.2 % Final   09/30/2015 6.7 (H) 4.5 - 6.2 % Final   07/10/2015 8.3 (H) 4.5 - 6.2 % Final       Review of Systems   Constitution: Positive for weight loss (Had gastric sleeve ). Negative for chills and fever.   Cardiovascular: Negative for leg swelling.   Respiratory: Negative for shortness of breath.    Skin: Negative for poor wound healing.   Musculoskeletal: Positive for arthritis, joint pain and joint swelling.   Gastrointestinal: Negative for nausea and vomiting.   Neurological: Positive for numbness (in feet) and paresthesias (in feet).           Objective:      Physical Exam   Constitutional: He is oriented to person, place, and time. He appears well-developed and well-nourished. No distress.   Cardiovascular: Normal rate and intact  distal pulses.    Pulses:       Dorsalis pedis pulses are 2+ on the right side, and 2+ on the left side.        Posterior tibial pulses are 1+ on the right side, and 1+ on the left side.   Toes are cool to touch. Feet are warm proximally.There is decreased digital hair. Skin is atrophic, slightly hyperpigmented, and moderately edematous.     Musculoskeletal: Normal range of motion. He exhibits edema (lymphedema noted bilaterally). He exhibits no tenderness.        Right foot: Deformity: Charcot foot.   Charcot midfoot deformity R foot, fairly stable, slight rocker bottom, however rectus position of the right foot overall.    Feet:   Right Foot:   Protective Sensation: 10 sites tested. 4 sites sensed.   Skin Integrity: Negative for ulcer, blister or skin breakdown.   Left Foot:   Protective Sensation: 10 sites tested. 4 sites sensed.   Skin Integrity: Negative for ulcer or blister.   Neurological: He is alert and oriented to person, place, and time. He exhibits normal muscle tone.   Sharp/dull sensation absent Bilaterally. Light touch absent Bilaterally.     Skin: Skin is warm and dry. No rash noted. He is not diaphoretic. No pallor.   Puncture wound at left plantar 2nd MTPJ, probes subcutaneous tissue. No bone or tendon palpated. Appears to be filling in with decreased depth: 2 x 2 x 3 mm.       Psychiatric: He has a normal mood and affect. His behavior is normal. Judgment and thought content normal.   Nursing note and vitals reviewed.    Aerobic culture   Order: [id number]   Status:  Final result   Visible to patient:  Yes (Patient Portal) Next appt:  None Dx:  Diabetic ulcer of toe of left foot as...      10d ago     Aerobic Bacterial Culture METHICILLIN RESISTANT STAPHYLOCOCCUS AUREUS  Many    Aerobic Bacterial Culture STENOTROPHOMONAS (X.) MALTOPHILIA  Rare  Skin mohsen also present               Lab Results   Component Value Date    SEDRATE 45 (H) 03/13/2017     Lab Results   Component Value Date    CRP 76.7 (H)  03/13/2017     Lab Results   Component Value Date    WBC 8.13 03/13/2017    HGB 13.9 (L) 03/13/2017    HCT 40.6 03/13/2017    MCV 89 03/13/2017     03/13/2017     Xray, 3/13/17: No evidence of fracture or dislocation.  Spurring of the inferior and posterior calcaneus.  Joint spaces are satisfactorily maintained.  Soft tissue structures demonstrate no significant abnormalities.  No radiopaque foreign body.    Culture 3/13 and 3/17: no growth, skin mohsen only        Assessment:       Encounter Diagnoses   Name Primary?    Diabetic ulcer of toe of left foot associated with type 2 diabetes mellitus, with fat layer exposed Yes    MRSA (methicillin resistant staph aureus) culture positive    This patient has documented high risk feet requiring routine maintenance secondary to diabetes mellitis and those secondary complications of diabetes, as mentioned.        Plan:       Jian was seen today for foot injury, foot ulcer and follow-up.    Diagnoses and all orders for this visit:    Diabetic ulcer of toe of left foot associated with type 2 diabetes mellitus, with fat layer exposed  -     doxycycline (MONODOX) 100 MG capsule; Take 1 capsule (100 mg total) by mouth 2 (two) times daily.  -     sulfamethoxazole-trimethoprim 800-160mg (BACTRIM DS) 800-160 mg Tab; Take 1 tablet by mouth 2 (two) times daily.  -     SUBSEQUENT HOME HEALTH ORDERS    MRSA (methicillin resistant staph aureus) culture positive    - Patient was given written and verbal instructions regarding foot condition.  I counseled the patient on his conditions, their implications and medical management.    - Wound debridement. No purulence noted. Wound irrigated, cultured and packed open. Offload with surgical shoe. Will consider CAM boot or TCC which he decline today.  - Home health to change packing M/W/F.  - He will f/u next week.     - Obtained verbal consent from the patient for excisional wound debridement, left plantar foot. No anesthesia was  required, Utilizing a sterile curet, all non-viable soft tissue was excised to level of health subcutaneous tissue. A healthy appearing wound base was achieved with minimal blood loss. Hemostasis achieved with compression. Wound site was irrigated with normal saline, cultured and dressed. Wound care instructions were reviewed with the patient. Patient tolerated the procedure well.      - Shoe inspection. Diabetic Foot Education. Patient reminded of the importance of good nutrition and blood sugar control to help prevent podiatric complications of diabetes. Patient instructed on proper foot hygeine. We discussed wearing proper shoe gear, daily foot inspections, never walking without protective shoe gear, never putting sharp instruments to feet

## 2017-04-24 ENCOUNTER — OFFICE VISIT (OUTPATIENT)
Dept: PODIATRY | Facility: CLINIC | Age: 62
End: 2017-04-24
Payer: COMMERCIAL

## 2017-04-24 VITALS
HEIGHT: 67 IN | WEIGHT: 315 LBS | BODY MASS INDEX: 49.44 KG/M2 | HEART RATE: 64 BPM | SYSTOLIC BLOOD PRESSURE: 104 MMHG | DIASTOLIC BLOOD PRESSURE: 62 MMHG

## 2017-04-24 DIAGNOSIS — T14.8XXA PUNCTURE WOUND: ICD-10-CM

## 2017-04-24 DIAGNOSIS — Z22.322 MRSA (METHICILLIN RESISTANT STAPH AUREUS) CULTURE POSITIVE: Primary | ICD-10-CM

## 2017-04-24 DIAGNOSIS — L97.522 DIABETIC ULCER OF TOE OF LEFT FOOT ASSOCIATED WITH TYPE 2 DIABETES MELLITUS, WITH FAT LAYER EXPOSED: ICD-10-CM

## 2017-04-24 DIAGNOSIS — E11.621 DIABETIC ULCER OF TOE OF LEFT FOOT ASSOCIATED WITH TYPE 2 DIABETES MELLITUS, WITH FAT LAYER EXPOSED: ICD-10-CM

## 2017-04-24 PROCEDURE — 99999 PR PBB SHADOW E&M-EST. PATIENT-LVL IV: CPT | Mod: PBBFAC,,, | Performed by: PODIATRIST

## 2017-04-24 PROCEDURE — 11042 DBRDMT SUBQ TIS 1ST 20SQCM/<: CPT | Mod: S$GLB,,, | Performed by: PODIATRIST

## 2017-04-24 PROCEDURE — 3046F HEMOGLOBIN A1C LEVEL >9.0%: CPT | Mod: 8P,S$GLB,, | Performed by: PODIATRIST

## 2017-04-24 PROCEDURE — 3078F DIAST BP <80 MM HG: CPT | Mod: S$GLB,,, | Performed by: PODIATRIST

## 2017-04-24 PROCEDURE — 1160F RVW MEDS BY RX/DR IN RCRD: CPT | Mod: S$GLB,,, | Performed by: PODIATRIST

## 2017-04-24 PROCEDURE — 4010F ACE/ARB THERAPY RXD/TAKEN: CPT | Mod: S$GLB,,, | Performed by: PODIATRIST

## 2017-04-24 PROCEDURE — 3074F SYST BP LT 130 MM HG: CPT | Mod: S$GLB,,, | Performed by: PODIATRIST

## 2017-04-24 PROCEDURE — 99499 UNLISTED E&M SERVICE: CPT | Mod: S$GLB,,, | Performed by: PODIATRIST

## 2017-04-24 RX ORDER — DOXYCYCLINE 100 MG/1
100 CAPSULE ORAL 2 TIMES DAILY
Qty: 20 CAPSULE | Refills: 0 | Status: SHIPPED | OUTPATIENT
Start: 2017-04-24 | End: 2019-01-28

## 2017-04-24 NOTE — MR AVS SNAPSHOT
[facility]  [address]  [facility] [address]  Phone: [phone]                  Jian Arrieta   2017 8:30 AM   Office Visit    Description:  Male : 1955   Provider:  Vin Hidalgo DPM   Department:  [facility]           Reason for Visit     Diabetic ulcer of toe of left foot associated with type 2 di           Diagnoses this Visit        Comments    Diabetic ulcer of toe of left foot associated with type 2 diabetes mellitus, with fat layer exposed                To Do List           Future Appointments        Provider Department Dept Phone    5/15/2017 9:15 AM Vin Hidalgo DPM [facility] [phone]      Goals (5 Years of Data)     None       These Medications        Disp Refills Start End    doxycycline (MONODOX) 100 MG capsule 20 capsule 0 2017     Take 1 capsule (100 mg total) by mouth 2 (two) times daily. - Oral    Pharmacy: [facility]/pharmacy #[id number] - [place] - [address] [facility] #: [phone]         [facility]     [facility] Care Line -  Assistance  Unless otherwise directed by your provider, please contact [facility], our nurse care line that is available for  assistance.     Registered nurses in the [facility] provide: appointment scheduling, clinical advisement, health education, and other advisory services.  Call: [phone] (toll free)               Medications           Message regarding Medications     Verify the changes and/or additions to your medication regime listed below are the same as discussed with your clinician today.  If any of these changes or additions are incorrect, please notify your healthcare provider.             Verify that the below list of medications is an accurate representation of the medications you are currently taking.  If none reported, the list may be blank. If incorrect, please contact your healthcare provider. Carry this list with you in case of  "emergency.           Current Medications     aspirin 81 MG Chew Take 81 mg by mouth once daily.    atorvastatin (LIPITOR) 80 MG tablet Take 80 mg by mouth once daily.    calcium citrate-vitamin D3 500 mg calcium -400 unit Chew Take 1 tablet by mouth 3 (three) times daily.    clopidogrel (PLAVIX) 75 mg tablet Take 75 mg by mouth once daily.    cyanocobalamin, vitamin B-12, 500 mcg Subl Place 1 tablet under the tongue once daily.    doxycycline (MONODOX) 100 MG capsule Take 1 capsule (100 mg total) by mouth 2 (two) times daily.    ezetimibe (ZETIA) 10 mg tablet Take 10 mg by mouth once daily.    glimepiride (AMARYL) 4 MG tablet Take 4 mg by mouth 2 (two) times daily. Pt was advised to hold by Endocrinologist, Dr Jaime Boo at     hydrocodone-acetaminophen 5-325mg (NORCO) 5-325 mg per tablet Take 1 tablet by mouth every 6 (six) hours as needed for Pain.    metoprolol tartrate (LOPRESSOR) 50 MG tablet Take 50 mg by mouth 2 (two) times daily.    nystatin (MYCOSTATIN) cream Apply topically 3 (three) times daily.    ondansetron (ZOFRAN-ODT) 8 MG TbDL Take 1 tablet (8 mg total) by mouth every 6 (six) hours as needed (nausea/vomiting).    ONETOUCH ULTRA TEST Strp 4 (four) times daily. Use to test blood sugar four times a day.    pediatric multivit-iron-min Chew Take 1 tablet by mouth 2 (two) times daily.    polyethylene glycol (GLYCOLAX) 17 gram PwPk Take 1 g by mouth once daily.    ramipril (ALTACE) 2.5 MG capsule Take 2.5 mg by mouth once daily.    ursodiol (ESTEFANIA FORTE) 500 MG tablet Take 1 tablet (500 mg total) by mouth once daily. Crush tablet and take daily.  Okay to compound into liquid at patient's request.    omeprazole (PRILOSEC) 40 MG capsule Take 1 capsule (40 mg total) by mouth once daily.           Clinical Reference Information           Your Vitals Were     BP Pulse Height Weight BMI    104/62 64 5' 7" (1.702 m) 146.1 kg (322 lb) 50.43 kg/m2      Blood Pressure          Most Recent Value    BP  104/62    "   Allergies as of 4/24/2017     No Known Allergies      Immunizations Administered on Date of Encounter - 4/24/2017     None      Orders Placed During Today's Visit      Normal Orders This Visit    SUBSEQUENT HOME HEALTH ORDERS       Language Assistance Services     ATTENTION: Language assistance services are available, free of charge. Please call [phone].      ATENCIÓN: Si habla avery, tiene a galvez disposición servicios gratuitos de asistencia lingüística. Llame al [phone].     CHÚ Ý: N?u b?n nói Ti?ng Vi?t, có các d?ch v? h? tr? ngôn ng? mi?n phí dành cho b?n. G?i s? [phone].         [facility] complies with applicable Federal civil rights laws and does not discriminate on the basis of race, color, national origin, age, disability, or sex.

## 2017-04-26 NOTE — PROGRESS NOTES
Subjective:      Patient ID: Jian Arrieta is a 61 y.o. male.    Chief Complaint: Diabetic ulcer of toe of left foot associated with type 2 di    Jian is a 61 y.o. male who presents to the clinic for evaluation and treatment of high risk feet. Jian has a past medical history of Cancer; Charcot foot due to diabetes mellitus; Charcot foot due to diabetes mellitus; Chest pain; Colon polyps; Coronary artery disease; Diabetes mellitus; Hydrocele; Hyperlipidemia; Lymphedema; and Perianal cyst.    This patient has documented high risk feet requiring routine maintenance secondary to diabetes mellitis and those secondary complications of diabetes, as mentioned. The patient's chief complaint is wound at left foot. Home health is changing dressing as prescribed. Looking forward to Jolly yost.     PCP: Samir Mahmood MD    Date Last Seen by PCP:   Chief Complaint   Patient presents with    Diabetic ulcer of toe of left foot associated with type 2 di        Current shoe gear:  Affected Foot: surgical shoe     Unaffected Foot: Extra depth shoes with custome accommodative insoles    Hemoglobin A1C   Date Value Ref Range Status   11/13/2015 6.7 (H) 4.5 - 6.2 % Final   09/30/2015 6.7 (H) 4.5 - 6.2 % Final   07/10/2015 8.3 (H) 4.5 - 6.2 % Final       Review of Systems   Constitution: Positive for weight loss (Had gastric sleeve ). Negative for chills and fever.   Cardiovascular: Negative for leg swelling.   Respiratory: Negative for shortness of breath.    Skin: Negative for poor wound healing.   Musculoskeletal: Positive for arthritis, joint pain and joint swelling.   Gastrointestinal: Negative for nausea and vomiting.   Neurological: Positive for numbness (in feet) and paresthesias (in feet).           Objective:      Physical Exam   Constitutional: He is oriented to person, place, and time. He appears well-developed and well-nourished. No distress.   Cardiovascular: Normal rate and intact distal pulses.    Pulses:        Dorsalis pedis pulses are 2+ on the right side, and 2+ on the left side.        Posterior tibial pulses are 1+ on the right side, and 1+ on the left side.   Toes are cool to touch. Feet are warm proximally.There is decreased digital hair. Skin is atrophic, slightly hyperpigmented, and moderately edematous.     Musculoskeletal: Normal range of motion. He exhibits edema (lymphedema noted bilaterally). He exhibits no tenderness.        Right foot: Deformity: Charcot foot.   Charcot midfoot deformity R foot, fairly stable, slight rocker bottom, however rectus position of the right foot overall.    Feet:   Right Foot:   Protective Sensation: 10 sites tested. 4 sites sensed.   Skin Integrity: Negative for ulcer, blister or skin breakdown.   Left Foot:   Protective Sensation: 10 sites tested. 4 sites sensed.   Skin Integrity: Negative for ulcer or blister.   Neurological: He is alert and oriented to person, place, and time. He exhibits normal muscle tone.   Sharp/dull sensation absent Bilaterally. Light touch absent Bilaterally.     Skin: Skin is warm and dry. No rash noted. He is not diaphoretic. No pallor.   Puncture wound at left plantar 2nd MTPJ, probes subcutaneous tissue. No bone or tendon palpated. Appears to be filling in with decreased depth: 2 x 2 x 3 mm.       Psychiatric: He has a normal mood and affect. His behavior is normal. Judgment and thought content normal.   Nursing note and vitals reviewed.    Aerobic culture   Order: [id number]   Status:  Final result   Visible to patient:  Yes (Patient Portal) Next appt:  None Dx:  Diabetic ulcer of toe of left foot as...      10d ago     Aerobic Bacterial Culture METHICILLIN RESISTANT STAPHYLOCOCCUS AUREUS  Many    Aerobic Bacterial Culture STENOTROPHOMONAS (X.) MALTOPHILIA  Rare  Skin mohsen also present               Lab Results   Component Value Date    SEDRATE 45 (H) 03/13/2017     Lab Results   Component Value Date    CRP 76.7 (H) 03/13/2017     Lab Results    Component Value Date    WBC 8.13 03/13/2017    HGB 13.9 (L) 03/13/2017    HCT 40.6 03/13/2017    MCV 89 03/13/2017     03/13/2017     Xray, 3/13/17: No evidence of fracture or dislocation.  Spurring of the inferior and posterior calcaneus.  Joint spaces are satisfactorily maintained.  Soft tissue structures demonstrate no significant abnormalities.  No radiopaque foreign body.    Culture 3/13 and 3/17: no growth, skin mohsen only        Assessment:       Encounter Diagnoses   Name Primary?    Diabetic ulcer of toe of left foot associated with type 2 diabetes mellitus, with fat layer exposed     MRSA (methicillin resistant staph aureus) culture positive Yes    Puncture wound - Left Foot    This patient has documented high risk feet requiring routine maintenance secondary to diabetes mellitis and those secondary complications of diabetes, as mentioned.        Plan:       Jian was seen today for diabetic ulcer of toe of left foot associated with type 2 di.    Diagnoses and all orders for this visit:    MRSA (methicillin resistant staph aureus) culture positive    Diabetic ulcer of toe of left foot associated with type 2 diabetes mellitus, with fat layer exposed  -     doxycycline (MONODOX) 100 MG capsule; Take 1 capsule (100 mg total) by mouth 2 (two) times daily.  -     SUBSEQUENT HOME HEALTH ORDERS    Puncture wound - Left Foot    - Patient was given written and verbal instructions regarding foot condition.  I counseled the patient on his conditions, their implications and medical management.    - Wound debridement. No purulence noted. Wound irrigated, cultured and packed open. Offload with surgical shoe. Will consider CAM boot or TCC which he decline today.  - Home health to change packing M/W/F.  - He will f/u 2 weeks.     - Obtained verbal consent from the patient for excisional wound debridement, left plantar foot. No anesthesia was required, Utilizing a sterile curet, all non-viable soft tissue was  excised to level of health subcutaneous tissue. A healthy appearing wound base was achieved with minimal blood loss. Hemostasis achieved with compression. Wound site was irrigated with normal saline, cultured and dressed. Wound care instructions were reviewed with the patient. Patient tolerated the procedure well.      - Shoe inspection. Diabetic Foot Education. Patient reminded of the importance of good nutrition and blood sugar control to help prevent podiatric complications of diabetes. Patient instructed on proper foot hygeine. We discussed wearing proper shoe gear, daily foot inspections, never walking without protective shoe gear, never putting sharp instruments to feet

## 2017-05-03 LAB — FUNGUS SPEC CULT: NORMAL

## 2017-05-11 ENCOUNTER — OFFICE VISIT (OUTPATIENT)
Dept: ORTHOPEDICS | Facility: CLINIC | Age: 62
End: 2017-05-11
Payer: COMMERCIAL

## 2017-05-11 ENCOUNTER — HOSPITAL ENCOUNTER (OUTPATIENT)
Dept: RADIOLOGY | Facility: HOSPITAL | Age: 62
Discharge: HOME | End: 2017-05-11
Attending: ORTHOPAEDIC SURGERY
Payer: COMMERCIAL

## 2017-05-11 DIAGNOSIS — M25.561 ACUTE PAIN OF RIGHT KNEE: ICD-10-CM

## 2017-05-11 DIAGNOSIS — M25.561 ACUTE PAIN OF RIGHT KNEE: Primary | ICD-10-CM

## 2017-05-11 DIAGNOSIS — M17.11 PRIMARY OSTEOARTHRITIS OF RIGHT KNEE: ICD-10-CM

## 2017-05-11 PROCEDURE — 99999 PR PBB SHADOW E&M-EST. PATIENT-LVL III: CPT | Mod: PBBFAC,,, | Performed by: PHYSICIAN ASSISTANT

## 2017-05-11 PROCEDURE — 73562 X-RAY EXAM OF KNEE 3: CPT | Mod: 26,RT,, | Performed by: RADIOLOGY

## 2017-05-11 PROCEDURE — 73560 X-RAY EXAM OF KNEE 1 OR 2: CPT | Mod: TC,LT

## 2017-05-11 PROCEDURE — 99213 OFFICE O/P EST LOW 20 MIN: CPT | Mod: S$GLB,,, | Performed by: PHYSICIAN ASSISTANT

## 2017-05-11 PROCEDURE — 1160F RVW MEDS BY RX/DR IN RCRD: CPT | Mod: S$GLB,,, | Performed by: PHYSICIAN ASSISTANT

## 2017-05-11 PROCEDURE — 73560 X-RAY EXAM OF KNEE 1 OR 2: CPT | Mod: 26,59,LT, | Performed by: RADIOLOGY

## 2017-05-11 RX ORDER — TRAMADOL HYDROCHLORIDE 50 MG/1
50 TABLET ORAL
Qty: 60 TABLET | Refills: 0 | Status: SHIPPED | OUTPATIENT
Start: 2017-05-11 | End: 2017-05-21

## 2017-05-12 NOTE — PROGRESS NOTES
Subjective:      Patient ID: Jian Arrieta is a 61 y.o. male.    Chief Complaint: No chief complaint on file.  HPI: This patient is seen today for evaluation of right knee pain.  He gives a history of having an arthroscopy and medial meniscectomy in March of 2015 with a physician at [facility].  He tells me that he was told he had arthritis in the knee as well and he has had pain in the knee ever since the surgery. With increased over the past week. He had an interarticular steroid injection which only gave him relief for about 2 days.  He has been full weightbearing on it without assistive devices but it causes him to limp.  He is also dealing with a Charcot foot as well as diabetic ulcer on the same side with MRSA infection. Last A1C in 11/16 was 6.7.  He has history of gastric sleeve done 12/20/2015.     Review of Systems   Constitution: Negative for chills, fever, weakness and weight loss.   Cardiovascular: Negative for claudication and leg swelling.   Hematologic/Lymphatic: Negative for adenopathy. Does not bruise/bleed easily.   Skin: Negative for color change.   Gastrointestinal: Negative for heartburn.   Neurological: Negative for focal weakness, loss of balance, numbness and paresthesias.   Allergic/Immunologic: Negative for persistent infections.     Review of patient's allergies indicates:  No Known Allergies      Objective:      There were no vitals filed for this visit.  Ortho/SPM Exam   General :   alert, appears stated age, cooperative and morbidly obese   Gait: Antalgic. The patient can bear weight on the injured extremity.   Right Lower Extremity  Hip Palpation:  no tenderness over the greater  trochanter   Hip ROM: 75% of normal    Knee Effusion:  None.   Ecchymosis:  none   Knee ROM:  15 to 110 degrees without subpatellar   crepitance.   Patella:  Patella does track normally.  Patellar apprehension test: negative  Patellar compression test: negative   Tenderness: medial joint line    Stability:  Lachman's test: negative  Posterior drawer: negative  Medial collateral ligament: negative  Lateral collateral ligament: negative         Rosales's Test:  negative with no joint line tenderness   Sensation:   intact to light touch   Pulses: normal DP and PT pulses     No erythema or increased warmth.     X-rays are obtained in the clinic and reviewed and show moderate degenerative changes, patella misalignment, no fracture or dislocation.     Assessment:       1. Acute pain of right knee    2. Primary osteoarthritis of right knee          Plan:       I have discussed the findings with him at length and since he has failed with interarticular steroid injections and is not a candidate for nonsteroidal anti-inflammatories because of the bariatric surgery I recommended that we consider Visco supplementation and he is in agreement with this plan of management.  Order placed. Patient is to return to clinic for first injection after he travels for an [employer] festival

## 2017-05-15 ENCOUNTER — OFFICE VISIT (OUTPATIENT)
Dept: PODIATRY | Facility: CLINIC | Age: 62
End: 2017-05-15
Payer: COMMERCIAL

## 2017-05-15 VITALS
HEART RATE: 70 BPM | WEIGHT: 315 LBS | HEIGHT: 67 IN | DIASTOLIC BLOOD PRESSURE: 70 MMHG | BODY MASS INDEX: 49.44 KG/M2 | SYSTOLIC BLOOD PRESSURE: 120 MMHG

## 2017-05-15 DIAGNOSIS — M14.60 ARTHROPATHY ASSOCIATED WITH NEUROLOGICAL DISORDER: ICD-10-CM

## 2017-05-15 DIAGNOSIS — L97.522 DIABETIC ULCER OF TOE OF LEFT FOOT ASSOCIATED WITH TYPE 2 DIABETES MELLITUS, WITH FAT LAYER EXPOSED: Primary | ICD-10-CM

## 2017-05-15 DIAGNOSIS — E11.621 DIABETIC ULCER OF TOE OF LEFT FOOT ASSOCIATED WITH TYPE 2 DIABETES MELLITUS, WITH FAT LAYER EXPOSED: Primary | ICD-10-CM

## 2017-05-15 PROCEDURE — 11042 DBRDMT SUBQ TIS 1ST 20SQCM/<: CPT | Mod: S$GLB,,, | Performed by: PODIATRIST

## 2017-05-15 PROCEDURE — 99999 PR PBB SHADOW E&M-EST. PATIENT-LVL IV: CPT | Mod: PBBFAC,,, | Performed by: PODIATRIST

## 2017-05-15 PROCEDURE — 99499 UNLISTED E&M SERVICE: CPT | Mod: S$GLB,,, | Performed by: PODIATRIST

## 2017-05-15 RX ORDER — HYALURONATE SODIUM 20 MG/2 ML
2 SYRINGE (ML) INTRAARTICULAR WEEKLY
Status: DISCONTINUED | OUTPATIENT
Start: 2017-06-15 | End: 2017-05-16

## 2017-05-15 NOTE — PROGRESS NOTES
Subjective:      Patient ID: Jian Arrieta is a 61 y.o. male.    Chief Complaint: Diabetes Mellitus (PCP Dr. Mahmood); Foot Ulcer; and Follow-up    Jian is a 61 y.o. male who presents to the clinic for evaluation and treatment of high risk feet. Jian has a past medical history of Cancer; Charcot foot due to diabetes mellitus; Charcot foot due to diabetes mellitus; Chest pain; Colon polyps; Coronary artery disease; Diabetes mellitus; Hydrocele; Hyperlipidemia; Lymphedema; and Perianal cyst.    This patient has documented high risk feet requiring routine maintenance secondary to diabetes mellitis and those secondary complications of diabetes, as mentioned. The patient's chief complaint is wound at left foot. Home health is changing dressing as prescribed. New wound at 5th toe developed last week due to increased time on feet during [employer] fest. Denies drainage, pain, swelling.    PCP: Samir Mahmood MD    Date Last Seen by PCP:   Chief Complaint   Patient presents with    Diabetes Mellitus     PCP Dr. Mahmood    Foot Ulcer    Follow-up        Current shoe gear:  Affected Foot: surgical shoe     Unaffected Foot: Extra depth shoes with custome accommodative insoles    Hemoglobin A1C   Date Value Ref Range Status   11/13/2015 6.7 (H) 4.5 - 6.2 % Final   09/30/2015 6.7 (H) 4.5 - 6.2 % Final   07/10/2015 8.3 (H) 4.5 - 6.2 % Final       Review of Systems   Constitution: Positive for weight loss (Had gastric sleeve ). Negative for chills and fever.   Cardiovascular: Negative for leg swelling.   Respiratory: Negative for shortness of breath.    Skin: Negative for poor wound healing.   Musculoskeletal: Positive for arthritis, joint pain and joint swelling.   Gastrointestinal: Negative for nausea and vomiting.   Neurological: Positive for numbness (in feet) and paresthesias (in feet).           Objective:      Physical Exam   Constitutional: He is oriented to person, place, and time. He appears well-developed and  well-nourished. No distress.   Cardiovascular: Normal rate and intact distal pulses.    Pulses:       Dorsalis pedis pulses are 2+ on the right side, and 2+ on the left side.        Posterior tibial pulses are 1+ on the right side, and 1+ on the left side.   Toes are cool to touch. Feet are warm proximally.There is decreased digital hair. Skin is atrophic, slightly hyperpigmented, and moderately edematous.     Musculoskeletal: Normal range of motion. He exhibits edema (lymphedema noted bilaterally). He exhibits no tenderness.        Right foot: Deformity: Charcot foot.   Charcot midfoot deformity R foot, fairly stable, slight rocker bottom, however rectus position of the right foot overall.    Feet:   Right Foot:   Protective Sensation: 10 sites tested. 4 sites sensed.   Skin Integrity: Negative for ulcer, blister or skin breakdown.   Left Foot:   Protective Sensation: 10 sites tested. 4 sites sensed.   Skin Integrity: Negative for ulcer or blister.   Neurological: He is alert and oriented to person, place, and time. He exhibits normal muscle tone.   Sharp/dull sensation absent Bilaterally. Light touch absent Bilaterally.     Skin: Skin is warm and dry. No rash noted. He is not diaphoretic. No pallor.   Puncture wound at left plantar 2nd MTPJ, healed. No signs of infection.    Ulcer Location: left 5th toe, IPJ  Measurements: 1 x 1.5 x 0.1 cm  Periwound: Intact  Drainage: None  Pus: None.  Malodor: None.  Base:  100% granular. 0% fibrin.  Signs of infection: None.         Psychiatric: He has a normal mood and affect. His behavior is normal. Judgment and thought content normal.   Nursing note and vitals reviewed.              Assessment:       Encounter Diagnoses   Name Primary?    Diabetic ulcer of toe of left foot associated with type 2 diabetes mellitus, with fat layer exposed Yes    Arthropathy associated with neurological disorder - Right Foot    This patient has documented high risk feet requiring routine  maintenance secondary to diabetes mellitis and those secondary complications of diabetes, as mentioned.        Plan:       Jian was seen today for diabetes mellitus, foot ulcer and follow-up.    Diagnoses and all orders for this visit:    Diabetic ulcer of toe of left foot associated with type 2 diabetes mellitus, with fat layer exposed  -     SUBSEQUENT HOME HEALTH ORDERS    Arthropathy associated with neurological disorder - Right Foot    - Patient was given written and verbal instructions regarding foot condition.  I counseled the patient on his conditions, their implications and medical management.    - Wound debridement. No purulence noted. Wound irrigated, cultured and packed open. Offload with surgical shoe. Will consider CAM boot or TCC which he decline today.  - Home health to change dressing.  - He will f/u 2 weeks.     - Obtained verbal consent from the patient for excisional wound debridement, left 5th toe. No anesthesia was required, Utilizing a sterile curet, all non-viable soft tissue was excised to level of health subcutaneous tissue. A healthy appearing wound base was achieved with minimal blood loss. Hemostasis achieved with compression. Wound site was irrigated with normal saline, cultured and dressed. Wound care instructions were reviewed with the patient. Patient tolerated the procedure well.      - Shoe inspection. Diabetic Foot Education. Patient reminded of the importance of good nutrition and blood sugar control to help prevent podiatric complications of diabetes. Patient instructed on proper foot hygeine. We discussed wearing proper shoe gear, daily foot inspections, never walking without protective shoe gear, never putting sharp instruments to feet

## 2017-06-02 LAB
ACID FAST MOD KINY STN SPEC: NORMAL
MYCOBACTERIUM SPEC QL CULT: NORMAL

## 2017-06-19 ENCOUNTER — OFFICE VISIT (OUTPATIENT)
Dept: ORTHOPEDICS | Facility: CLINIC | Age: 62
End: 2017-06-19
Payer: COMMERCIAL

## 2017-06-19 ENCOUNTER — OFFICE VISIT (OUTPATIENT)
Dept: PODIATRY | Facility: CLINIC | Age: 62
End: 2017-06-19
Payer: COMMERCIAL

## 2017-06-19 VITALS
HEIGHT: 67 IN | WEIGHT: 315 LBS | HEART RATE: 76 BPM | DIASTOLIC BLOOD PRESSURE: 66 MMHG | SYSTOLIC BLOOD PRESSURE: 108 MMHG | BODY MASS INDEX: 49.44 KG/M2

## 2017-06-19 DIAGNOSIS — M17.11 PRIMARY OSTEOARTHRITIS OF RIGHT KNEE: Primary | ICD-10-CM

## 2017-06-19 DIAGNOSIS — M14.60 ARTHROPATHY ASSOCIATED WITH NEUROLOGICAL DISORDER: ICD-10-CM

## 2017-06-19 DIAGNOSIS — Z87.2 HEALED ULCER OF LEFT FOOT ON EXAMINATION: Primary | ICD-10-CM

## 2017-06-19 DIAGNOSIS — I89.0 ACQUIRED LYMPHEDEMA OF LEG: ICD-10-CM

## 2017-06-19 DIAGNOSIS — B35.1 ONYCHOMYCOSIS OF MULTIPLE TOENAILS WITH TYPE 2 DIABETES MELLITUS AND PERIPHERAL NEUROPATHY: ICD-10-CM

## 2017-06-19 DIAGNOSIS — E11.69 ONYCHOMYCOSIS OF MULTIPLE TOENAILS WITH TYPE 2 DIABETES MELLITUS AND PERIPHERAL NEUROPATHY: ICD-10-CM

## 2017-06-19 DIAGNOSIS — E11.42 ONYCHOMYCOSIS OF MULTIPLE TOENAILS WITH TYPE 2 DIABETES MELLITUS AND PERIPHERAL NEUROPATHY: ICD-10-CM

## 2017-06-19 PROCEDURE — 99499 UNLISTED E&M SERVICE: CPT | Mod: S$GLB,,, | Performed by: PHYSICIAN ASSISTANT

## 2017-06-19 PROCEDURE — 99213 OFFICE O/P EST LOW 20 MIN: CPT | Mod: S$GLB,,, | Performed by: PODIATRIST

## 2017-06-19 PROCEDURE — 20610 DRAIN/INJ JOINT/BURSA W/O US: CPT | Mod: RT,S$GLB,, | Performed by: PHYSICIAN ASSISTANT

## 2017-06-19 PROCEDURE — 4010F ACE/ARB THERAPY RXD/TAKEN: CPT | Mod: S$GLB,,, | Performed by: PODIATRIST

## 2017-06-19 PROCEDURE — 99999 PR PBB SHADOW E&M-EST. PATIENT-LVL II: CPT | Mod: PBBFAC,,, | Performed by: PHYSICIAN ASSISTANT

## 2017-06-19 PROCEDURE — 99999 PR PBB SHADOW E&M-EST. PATIENT-LVL IV: CPT | Mod: PBBFAC,,, | Performed by: PODIATRIST

## 2017-06-19 NOTE — PROGRESS NOTES
Jian Arrieta is a 61 y.o. year old his here today for his first  Synvisc injection for degenerative changes of hisright knee . he was last seen and treated in the clinic on 5/11/2017. There has been no significant change in his medical status since his last visit. No Fever, chills, malaise, or unexplained weight change.      Allergies, Medications, past medical and surgical history were reviewed .    Examination of the knee demonstrates  No evidence of edema, erythema , echymosis strength and range of motion are unchanged from previous visit.    The injection site was identified and the skin was prepared with a betadine solution. The joint was injected with 2 ml of Synvisc solution under sterile technique. Jian Arrieta tolerated the procedure well, he was advised to rest the knee today, ice and elevation. I may take 3 -6 weeks following the last injection to get the full benefit of the medication.  I will see him back in 1 week. Sooner if he has any problems or concerns.           .

## 2017-06-19 NOTE — LETTER
June 19, 2017      Micheal Lim MD  [address] [facility] [address]           [facility] - Orthopedics  [address]  [place] [address]  Phone: [phone]          Patient: Jian Arrieta   MR Number: [id number]   YOB: 1955   Date of Visit: 6/19/2017       Dear Dr. Micheal Lim:    Thank you for referring Jian Arrieta to me for evaluation. Attached you will find relevant portions of my assessment and plan of care.    If you have questions, please do not hesitate to call me. I look forward to following Jian Arrieta along with you.    Sincerely,    Bernadette Bhatt PA-C    Enclosure  CC:  No Recipients    If you would like to receive this communication electronically, please contact [email or web address] or [phone] to request more information on [employer]self Link access.    For providers and/or their staff who would like to refer a patient to [facility], please contact us through our one-stop-shop provider referral line, [facility] , at [phone].    If you feel you have received this communication in error or would no longer like to receive these types of communications, please e-mail [email or web address]

## 2017-06-20 PROBLEM — E11.42 ONYCHOMYCOSIS OF MULTIPLE TOENAILS WITH TYPE 2 DIABETES MELLITUS AND PERIPHERAL NEUROPATHY: Status: ACTIVE | Noted: 2017-06-20

## 2017-06-20 PROBLEM — B35.1 ONYCHOMYCOSIS OF MULTIPLE TOENAILS WITH TYPE 2 DIABETES MELLITUS AND PERIPHERAL NEUROPATHY: Status: ACTIVE | Noted: 2017-06-20

## 2017-06-20 PROBLEM — E11.69 ONYCHOMYCOSIS OF MULTIPLE TOENAILS WITH TYPE 2 DIABETES MELLITUS AND PERIPHERAL NEUROPATHY: Status: ACTIVE | Noted: 2017-06-20

## 2017-06-20 PROBLEM — Z87.2 HEALED ULCER OF LEFT FOOT ON EXAMINATION: Status: ACTIVE | Noted: 2017-06-20

## 2017-06-20 PROBLEM — I89.0 ACQUIRED LYMPHEDEMA OF LEG: Status: ACTIVE | Noted: 2017-06-20

## 2017-06-20 NOTE — PROGRESS NOTES
Subjective:      Patient ID: Jian Arrieta is a 61 y.o. male.    Chief Complaint: Diabetes Mellitus (PCP Dr. Mahmood); Foot Ulcer; and Follow-up    Jian is a 61 y.o. male who presents to the clinic for evaluation and treatment of high risk feet. Jian has a past medical history of Cancer; Charcot foot due to diabetes mellitus; Charcot foot due to diabetes mellitus; Chest pain; Colon polyps; Coronary artery disease; Diabetes mellitus; Hydrocele; Hyperlipidemia; Lymphedema; and Perianal cyst.    This patient has documented high risk feet requiring routine maintenance secondary to diabetes mellitis and those secondary complications of diabetes, as mentioned. The patient's chief complaint is wound at left foot. Notes improvement with dressing changes, home health. Denies drainage, pain, swelling. He returned to his shoes last week.    PCP: Samir Mahmood MD    Date Last Seen by PCP:   Chief Complaint   Patient presents with    Diabetes Mellitus     PCP Dr. Mahmood    Foot Ulcer    Follow-up        Current shoe gear:  Extra depth shoes with custome accommodative insoles    Hemoglobin A1C   Date Value Ref Range Status   11/13/2015 6.7 (H) 4.5 - 6.2 % Final   09/30/2015 6.7 (H) 4.5 - 6.2 % Final   07/10/2015 8.3 (H) 4.5 - 6.2 % Final       Review of Systems   Constitution: Positive for weight loss (Had gastric sleeve ). Negative for chills and fever.   Cardiovascular: Negative for leg swelling.   Respiratory: Negative for shortness of breath.    Skin: Negative for poor wound healing.   Musculoskeletal: Positive for arthritis, joint pain and joint swelling.   Gastrointestinal: Negative for nausea and vomiting.   Neurological: Positive for numbness (in feet) and paresthesias (in feet).           Objective:      Physical Exam   Constitutional: He is oriented to person, place, and time. He appears well-developed and well-nourished. No distress.   Cardiovascular: Normal rate and intact distal pulses.    Pulses:       Dorsalis  pedis pulses are 2+ on the right side, and 2+ on the left side.        Posterior tibial pulses are 1+ on the right side, and 1+ on the left side.   Toes are cool to touch. Feet are warm proximally.There is decreased digital hair. Skin is atrophic, slightly hyperpigmented, and moderately edematous.     Musculoskeletal: Normal range of motion. He exhibits edema (lymphedema noted bilaterally). He exhibits no tenderness.        Right foot: Deformity: Charcot foot.   Charcot midfoot deformity R foot, fairly stable, slight rocker bottom, however rectus position of the right foot overall.    Feet:   Right Foot:   Protective Sensation: 10 sites tested. 4 sites sensed.   Skin Integrity: Negative for ulcer, blister or skin breakdown.   Left Foot:   Protective Sensation: 10 sites tested. 4 sites sensed.   Skin Integrity: Negative for ulcer or blister.   Neurological: He is alert and oriented to person, place, and time. He exhibits normal muscle tone.   Sharp/dull sensation absent Bilaterally. Light touch absent Bilaterally.     Skin: Skin is warm and dry. No rash noted. He is not diaphoretic. No pallor.   Puncture wound at left plantar 2nd MTPJ, healed. No signs of infection.    Ulcer Location: left 5th toe, IPJ  Measurements: 1 x 1.5 x 0.1 cm  Periwound: Intact  Drainage: None  Pus: None.  Malodor: None.  Base:  100% granular. 0% fibrin.  Signs of infection: None.         Psychiatric: He has a normal mood and affect. His behavior is normal. Judgment and thought content normal.   Nursing note and vitals reviewed.              Assessment:       Encounter Diagnoses   Name Primary?    Healed ulcer of left foot on examination Yes    Arthropathy associated with neurological disorder - Right Foot     Onychomycosis due to dermatophyte    This patient has documented high risk feet requiring routine maintenance secondary to diabetes mellitis and those secondary complications of diabetes, as mentioned.        Plan:       Jian was  seen today for diabetes mellitus, foot ulcer and follow-up.    Diagnoses and all orders for this visit:    Healed ulcer of left foot on examination    Arthropathy associated with neurological disorder - Right Foot    Onychomycosis due to dermatophyte    - Patient was given written and verbal instructions regarding foot condition.  I counseled the patient on his conditions, their implications and medical management.    - Return to diabetic shoes, inserts.     - Discussed all treatment options such as topical, oral, laser treatments vs surgical removal of nail permanent vs non-permanent in detail pertaining to nail fungus. He will continue with topical antifungal. May consider nail removal in future if no improvement.     - Shoe inspection. Diabetic Foot Education. Patient reminded of the importance of good nutrition and blood sugar control to help prevent podiatric complications of diabetes. Patient instructed on proper foot hygeine. We discussed wearing proper shoe gear, daily foot inspections, never walking without protective shoe gear, never putting sharp instruments to feet

## 2017-06-26 ENCOUNTER — OFFICE VISIT (OUTPATIENT)
Dept: ORTHOPEDICS | Facility: CLINIC | Age: 62
End: 2017-06-26
Payer: COMMERCIAL

## 2017-06-26 VITALS
SYSTOLIC BLOOD PRESSURE: 129 MMHG | DIASTOLIC BLOOD PRESSURE: 73 MMHG | WEIGHT: 315 LBS | RESPIRATION RATE: 18 BRPM | BODY MASS INDEX: 49.44 KG/M2 | HEART RATE: 89 BPM | HEIGHT: 67 IN

## 2017-06-26 DIAGNOSIS — M17.11 PRIMARY OSTEOARTHRITIS OF RIGHT KNEE: Primary | ICD-10-CM

## 2017-06-26 PROCEDURE — 20610 DRAIN/INJ JOINT/BURSA W/O US: CPT | Mod: RT,S$GLB,, | Performed by: PHYSICIAN ASSISTANT

## 2017-06-26 PROCEDURE — 99999 PR PBB SHADOW E&M-EST. PATIENT-LVL IV: CPT | Mod: PBBFAC,,, | Performed by: PHYSICIAN ASSISTANT

## 2017-06-26 PROCEDURE — 99499 UNLISTED E&M SERVICE: CPT | Mod: S$GLB,,, | Performed by: PHYSICIAN ASSISTANT

## 2017-06-26 NOTE — PROGRESS NOTES
Jian Arrieta is a 61 y.o. year old his here today for his second Synvisc injection for degenerative changes of hisright knee . he was last seen and treated in the clinic on 5/11/2017. There has been no significant change in his medical status since his last visit. No Fever, chills, malaise, or unexplained weight change.      Allergies, Medications, past medical and surgical history were reviewed .    Examination of the knee demonstrates  No evidence of edema, erythema , echymosis strength and range of motion are unchanged from previous visit.    The injection site was identified and the skin was prepared with a betadine solution. The joint was injected with 2 ml of Synvisc solution under sterile technique. Jian Arrieta tolerated the procedure well, he was advised to rest the knee today, ice and elevation. I may take 3 -6 weeks following the last injection to get the full benefit of the medication.  I will see him back in 1 week. Sooner if he has any problems or concerns.           .

## 2017-06-26 NOTE — LETTER
June 26, 2017      Micheal Lim MD  [address]  [facility] [address]           [facility] - Orthopedics  [address]  [facility] [address]  Phone: [phone]          Patient: Jian Arrieta   MR Number: [id number]   YOB: 1955   Date of Visit: 6/26/2017       Dear Dr. Micheal Lim:    Thank you for referring Jian Arrieta to me for evaluation. Attached you will find relevant portions of my assessment and plan of care.    If you have questions, please do not hesitate to call me. I look forward to following Jian Arrieta along with you.    Sincerely,    Bernadette Bhatt PA-C    Enclosure  CC:  No Recipients    If you would like to receive this communication electronically, please contact [email or web address] or [phone] to request more information on [employer] Link access.    For providers and/or their staff who would like to refer a patient to [facility], please contact us through our one-stop-shop provider referral line, [facility], at [phone].    If you feel you have received this communication in error or would no longer like to receive these types of communications, please e-mail [email or web address]

## 2017-07-03 ENCOUNTER — OFFICE VISIT (OUTPATIENT)
Dept: ORTHOPEDICS | Facility: CLINIC | Age: 62
End: 2017-07-03
Payer: COMMERCIAL

## 2017-07-03 VITALS — HEIGHT: 67 IN | WEIGHT: 315 LBS | BODY MASS INDEX: 49.44 KG/M2

## 2017-07-03 DIAGNOSIS — M17.11 PRIMARY OSTEOARTHRITIS OF RIGHT KNEE: Primary | ICD-10-CM

## 2017-07-03 PROCEDURE — 20610 DRAIN/INJ JOINT/BURSA W/O US: CPT | Mod: RT,S$GLB,, | Performed by: PHYSICIAN ASSISTANT

## 2017-07-03 PROCEDURE — 99499 UNLISTED E&M SERVICE: CPT | Mod: S$GLB,,, | Performed by: PHYSICIAN ASSISTANT

## 2017-07-03 PROCEDURE — 99999 PR PBB SHADOW E&M-EST. PATIENT-LVL III: CPT | Mod: PBBFAC,,, | Performed by: PHYSICIAN ASSISTANT

## 2017-07-03 NOTE — LETTER
July 3, 2017      Micheal Lim MD  [address]  [facility] [address]           [facility] - Orthopedics  [address]  [facility] [address]  Phone: [phone]          Patient: Jian Arrieta   MR Number: [id number]   YOB: 1955   Date of Visit: 7/3/2017       Dear Dr. Micheal Lim:    Thank you for referring Jian Arrieta to me for evaluation. Attached you will find relevant portions of my assessment and plan of care.    If you have questions, please do not hesitate to call me. I look forward to following Jian Arrieta along with you.    Sincerely,    Bernadette Bhatt PA-C    Enclosure  CC:  No Recipients    If you would like to receive this communication electronically, please contact [email or web address] or [phone] to request more information on [employer] Link access.    For providers and/or their staff who would like to refer a patient to [facility], please contact us through our one-stop-shop provider referral line, [facility], at [phone].    If you feel you have received this communication in error or would no longer like to receive these types of communications, please e-mail [email or web address]

## 2017-07-03 NOTE — PROGRESS NOTES
Jian Arrieta is a 61 y.o. year old his here today for his third Synvisc injection for degenerative changes of hisright knee . he was last seen and treated in the clinic on 5/11/2017. There has been no significant change in his medical status since his last visit. No Fever, chills, malaise, or unexplained weight change.      Allergies, Medications, past medical and surgical history were reviewed .    Examination of the knee demonstrates  No evidence of edema, erythema , echymosis strength and range of motion are unchanged from previous visit.    The injection site was identified and the skin was prepared with a betadine solution. The joint was injected with 2 ml of Synvisc solution under sterile technique. Jian Arrieta tolerated the procedure well, he was advised to rest the knee today, ice and elevation. I may take 3 -6 weeks following the last injection to get the full benefit of the medication.  I will see him back in 1 week. Sooner if he has any problems or concerns.           .

## 2017-08-09 ENCOUNTER — OFFICE VISIT (OUTPATIENT)
Dept: INTERNAL MEDICINE | Facility: CLINIC | Age: 62
End: 2017-08-09
Payer: COMMERCIAL

## 2017-08-09 VITALS
WEIGHT: 315 LBS | HEART RATE: 98 BPM | SYSTOLIC BLOOD PRESSURE: 126 MMHG | HEIGHT: 67 IN | BODY MASS INDEX: 49.44 KG/M2 | TEMPERATURE: 98 F | DIASTOLIC BLOOD PRESSURE: 84 MMHG | OXYGEN SATURATION: 96 %

## 2017-08-09 DIAGNOSIS — T63.301A SPIDER BITE WOUND, ACCIDENTAL OR UNINTENTIONAL, INITIAL ENCOUNTER: Primary | ICD-10-CM

## 2017-08-09 PROCEDURE — 99999 PR PBB SHADOW E&M-EST. PATIENT-LVL III: CPT | Mod: PBBFAC,,, | Performed by: INTERNAL MEDICINE

## 2017-08-09 PROCEDURE — 3074F SYST BP LT 130 MM HG: CPT | Mod: S$GLB,,, | Performed by: INTERNAL MEDICINE

## 2017-08-09 PROCEDURE — 3008F BODY MASS INDEX DOCD: CPT | Mod: S$GLB,,, | Performed by: INTERNAL MEDICINE

## 2017-08-09 PROCEDURE — 99213 OFFICE O/P EST LOW 20 MIN: CPT | Mod: S$GLB,,, | Performed by: INTERNAL MEDICINE

## 2017-08-09 PROCEDURE — 3079F DIAST BP 80-89 MM HG: CPT | Mod: S$GLB,,, | Performed by: INTERNAL MEDICINE

## 2017-08-09 RX ORDER — SULFAMETHOXAZOLE AND TRIMETHOPRIM 800; 160 MG/1; MG/1
1 TABLET ORAL 2 TIMES DAILY
Qty: 20 TABLET | Refills: 0 | Status: SHIPPED | OUTPATIENT
Start: 2017-08-09 | End: 2019-01-28

## 2018-11-20 ENCOUNTER — TELEPHONE (OUTPATIENT)
Dept: ENDOSCOPY | Facility: HOSPITAL | Age: 63
End: 2018-11-20

## 2018-11-29 ENCOUNTER — TELEPHONE (OUTPATIENT)
Dept: ENDOSCOPY | Facility: HOSPITAL | Age: 63
End: 2018-11-29

## 2018-11-29 NOTE — TELEPHONE ENCOUNTER
----- Message from Khadijah Taz sent at 11/29/2018 10:27 AM CST -----  Contact: Self- [phone]  Celia- pt called to schedule an appt for a pancreactic pseudocyst - being referred by Dr. Jordi Aragon at - please contact pt at [phone]

## 2018-11-30 ENCOUNTER — TELEPHONE (OUTPATIENT)
Dept: ENDOSCOPY | Facility: HOSPITAL | Age: 63
End: 2018-11-30

## 2018-12-06 ENCOUNTER — OFFICE VISIT (OUTPATIENT)
Dept: GASTROENTEROLOGY | Facility: CLINIC | Age: 63
End: 2018-12-06
Payer: COMMERCIAL

## 2018-12-06 VITALS — BODY MASS INDEX: 50.75 KG/M2 | WEIGHT: 315 LBS

## 2018-12-06 DIAGNOSIS — K85.92 ACUTE PANCREATITIS WITH INFECTED NECROSIS, UNSPECIFIED PANCREATITIS TYPE: Primary | ICD-10-CM

## 2018-12-06 DIAGNOSIS — Z98.84 STATUS POST BARIATRIC SURGERY: ICD-10-CM

## 2018-12-06 PROCEDURE — 3008F BODY MASS INDEX DOCD: CPT | Mod: CPTII,S$GLB,, | Performed by: INTERNAL MEDICINE

## 2018-12-06 PROCEDURE — 99999 PR PBB SHADOW E&M-EST. PATIENT-LVL III: CPT | Mod: PBBFAC,,,

## 2018-12-06 PROCEDURE — 99203 OFFICE O/P NEW LOW 30 MIN: CPT | Mod: S$GLB,,, | Performed by: INTERNAL MEDICINE

## 2018-12-06 RX ORDER — ASPIRIN 325 MG
81 TABLET ORAL DAILY
Status: ON HOLD | COMMUNITY
End: 2019-03-20 | Stop reason: HOSPADM

## 2018-12-06 RX ORDER — INSULIN ASPART 100 [IU]/ML
INJECTION, SUSPENSION SUBCUTANEOUS
Status: ON HOLD | COMMUNITY
End: 2019-01-28

## 2018-12-06 NOTE — PROGRESS NOTES
Subjective:       Patient ID: Jian Arrieta is a 63 y.o. male.    Chief Complaint: Pancreatitis    Mr SHANNAN had a bad episode of pancreatitis in September.  He was in the hospital for 5 days.  His symptoms included abdominal pain in the midepigastric region that radiated to his back .  He had looses stool.  TA CT scan showed severe inflammation.  The etiology was thought to be alcohol.  Recently He was found to have a pancreatic pseudocyst at least 14mm in diameter with air in it. The current symptoms include some early satiety with a 50 pound weight loss.  He has night sweats but no fevers that he's aware of. He is pain free. He has occasional diarrhea and loose stool.  He is taking Creon for that, but is not following a low fat diet.     His blood sugars are all over the place.           Review of Systems   Constitutional: Positive for unexpected weight change.   HENT: Negative.    Eyes: Negative.    Respiratory: Negative.    Cardiovascular: Negative.    Gastrointestinal: Positive for abdominal distention, abdominal pain, diarrhea and nausea.       Objective:      Physical Exam   Constitutional: He is oriented to person, place, and time. He has a sickly appearance. He appears distressed.   HENT:   Head: Normocephalic.   Eyes: EOM are normal. Pupils are equal, round, and reactive to light.   Neck: Normal range of motion. Neck supple.   Cardiovascular: Normal rate, regular rhythm and normal heart sounds.   Pulmonary/Chest: Effort normal and breath sounds normal.   Abdominal: Soft. He exhibits mass. He exhibits no distension. There is no tenderness. There is no guarding. Rebound: obese.   Musculoskeletal: Normal range of motion.   Neurological: He is alert and oriented to person, place, and time.       Assessment:       1. Acute pancreatitis with infected necrosis, unspecified pancreatitis type    2. Status post bariatric surgery        Plan:       I personally reviewed his CT scan that he brought with him today.  I  interviewed his relative today to augment his history  I would like to wait a bit more to allow the necroma to wall off a bit better. I've ordered a CT scan to happen n 6 weeks.  I'll follow up with him over the phone then to determined the timing of a necrsectomy.    He's in overall poor health and his sleeve gastrectomy may limit the technical ability to perform the necrosectomy.    I spent 40 minutes with the patient., over 50% of it in coordinating care and counseling the patient

## 2019-01-28 ENCOUNTER — TELEPHONE (OUTPATIENT)
Dept: GASTROENTEROLOGY | Facility: CLINIC | Age: 64
End: 2019-01-28

## 2019-01-28 ENCOUNTER — HOSPITAL ENCOUNTER (INPATIENT)
Facility: HOSPITAL | Age: 64
LOS: 2 days | Discharge: HOME | DRG: 433 | End: 2019-01-30
Attending: EMERGENCY MEDICINE | Admitting: HOSPITALIST
Payer: COMMERCIAL

## 2019-01-28 DIAGNOSIS — R60.0 BILATERAL EDEMA OF LOWER EXTREMITY: ICD-10-CM

## 2019-01-28 DIAGNOSIS — I50.42 CHRONIC COMBINED SYSTOLIC AND DIASTOLIC HEART FAILURE: ICD-10-CM

## 2019-01-28 DIAGNOSIS — E11.42 DIABETIC POLYNEUROPATHY ASSOCIATED WITH TYPE 2 DIABETES MELLITUS: ICD-10-CM

## 2019-01-28 DIAGNOSIS — E66.01 MORBID OBESITY WITH BMI OF 50.0-59.9, ADULT: ICD-10-CM

## 2019-01-28 DIAGNOSIS — R60.9 SWELLING: ICD-10-CM

## 2019-01-28 DIAGNOSIS — K86.0 ALCOHOL-INDUCED CHRONIC PANCREATITIS: ICD-10-CM

## 2019-01-28 DIAGNOSIS — I25.10 CORONARY ARTERY DISEASE DUE TO CALCIFIED CORONARY LESION: ICD-10-CM

## 2019-01-28 DIAGNOSIS — I25.84 CORONARY ARTERY DISEASE DUE TO CALCIFIED CORONARY LESION: ICD-10-CM

## 2019-01-28 DIAGNOSIS — R18.8 OTHER ASCITES: Primary | ICD-10-CM

## 2019-01-28 DIAGNOSIS — I70.90 ATHEROSCLEROSIS: ICD-10-CM

## 2019-01-28 DIAGNOSIS — K74.60 HEPATIC CIRRHOSIS, UNSPECIFIED HEPATIC CIRRHOSIS TYPE, UNSPECIFIED WHETHER ASCITES PRESENT: ICD-10-CM

## 2019-01-28 DIAGNOSIS — I50.9 HF (HEART FAILURE): ICD-10-CM

## 2019-01-28 DIAGNOSIS — K70.31 ALCOHOLIC CIRRHOSIS OF LIVER WITH ASCITES: ICD-10-CM

## 2019-01-28 DIAGNOSIS — R10.9 ABDOMINAL PAIN: ICD-10-CM

## 2019-01-28 DIAGNOSIS — R06.02 SOB (SHORTNESS OF BREATH): ICD-10-CM

## 2019-01-28 DIAGNOSIS — K25.9 GASTRIC ULCER, UNSPECIFIED CHRONICITY, UNSPECIFIED WHETHER GASTRIC ULCER HEMORRHAGE OR PERFORATION PRESENT: ICD-10-CM

## 2019-01-28 PROBLEM — K86.3 PSEUDOCYST OF PANCREAS: Status: ACTIVE | Noted: 2019-01-28

## 2019-01-28 PROBLEM — R60.1 ANASARCA: Status: ACTIVE | Noted: 2019-01-28

## 2019-01-28 PROBLEM — K57.90 DIVERTICULOSIS: Status: ACTIVE | Noted: 2019-01-28

## 2019-01-28 PROBLEM — K86.1 CHRONIC PANCREATITIS: Status: ACTIVE | Noted: 2019-01-28

## 2019-01-28 PROBLEM — I10 HYPERTENSION: Status: ACTIVE | Noted: 2019-01-28

## 2019-01-28 LAB
25(OH)D3+25(OH)D2 SERPL-MCNC: 22 NG/ML
ALBUMIN SERPL BCP-MCNC: 1.6 G/DL
ALP SERPL-CCNC: 129 U/L
ALT SERPL W/O P-5'-P-CCNC: 15 U/L
ANION GAP SERPL CALC-SCNC: 6 MMOL/L
ASCENDING AORTA: 3.14 CM
AST SERPL-CCNC: 24 U/L
AV INDEX (PROSTH): 0.58
AV MEAN GRADIENT: 5.08 MMHG
AV PEAK GRADIENT: 9.24 MMHG
AV VALVE AREA: 2.55 CM2
AV VELOCITY RATIO: 0.55
BASOPHILS # BLD AUTO: 0.05 K/UL
BASOPHILS NFR BLD: 0.4 %
BILIRUB SERPL-MCNC: 0.5 MG/DL
BILIRUB UR QL STRIP: NEGATIVE
BNP SERPL-MCNC: 89 PG/ML
BSA FOR ECHO PROCEDURE: 2.66 M2
BUN SERPL-MCNC: 18 MG/DL
BUN SERPL-MCNC: 21 MG/DL (ref 6–30)
CALCIUM SERPL-MCNC: 7.9 MG/DL
CHLORIDE SERPL-SCNC: 105 MMOL/L (ref 95–110)
CHLORIDE SERPL-SCNC: 108 MMOL/L
CHOLEST SERPL-MCNC: 85 MG/DL
CHOLEST/HDLC SERPL: 2.7 {RATIO}
CLARITY UR REFRACT.AUTO: CLEAR
CO2 SERPL-SCNC: 22 MMOL/L
COLOR UR AUTO: YELLOW
CREAT SERPL-MCNC: 0.9 MG/DL
CREAT SERPL-MCNC: 1 MG/DL (ref 0.5–1.4)
CRP SERPL-MCNC: 71.5 MG/L
CV ECHO LV RWT: 0.38 CM
DIFFERENTIAL METHOD: ABNORMAL
DOP CALC AO PEAK VEL: 1.52 M/S
DOP CALC AO VTI: 28.29 CM
DOP CALC LVOT AREA: 4.41 CM2
DOP CALC LVOT DIAMETER: 2.37 CM
DOP CALC LVOT PEAK VEL: 0.84 M/S
DOP CALC LVOT STROKE VOLUME: 72.05 CM3
DOP CALCLVOT PEAK VEL VTI: 16.34 CM
E WAVE DECELERATION TIME: 267.53 MSEC
E/A RATIO: 0.77
E/E' RATIO: 6.78
ECHO LV POSTERIOR WALL: 1 CM (ref 0.6–1.1)
EOSINOPHIL # BLD AUTO: 0 K/UL
EOSINOPHIL NFR BLD: 0.3 %
ERYTHROCYTE [DISTWIDTH] IN BLOOD BY AUTOMATED COUNT: 14.6 %
EST. GFR  (AFRICAN AMERICAN): >60 ML/MIN/1.73 M^2
EST. GFR  (NON AFRICAN AMERICAN): >60 ML/MIN/1.73 M^2
ESTIMATED AVG GLUCOSE: 151 MG/DL
FRACTIONAL SHORTENING: 28 % (ref 28–44)
GLUCOSE SERPL-MCNC: 88 MG/DL (ref 70–110)
GLUCOSE SERPL-MCNC: 89 MG/DL
GLUCOSE UR QL STRIP: NEGATIVE
HBA1C MFR BLD HPLC: 6.9 %
HCT VFR BLD AUTO: 35 %
HCT VFR BLD CALC: 32 %PCV (ref 36–54)
HDLC SERPL-MCNC: 31 MG/DL
HDLC SERPL: 36.5 %
HGB BLD-MCNC: 11.1 G/DL
HGB UR QL STRIP: NEGATIVE
IMM GRANULOCYTES # BLD AUTO: 0.04 K/UL
IMM GRANULOCYTES NFR BLD AUTO: 0.3 %
INR PPP: 1.1
INR PPP: 1.1
INTERVENTRICULAR SEPTUM: 1.05 CM (ref 0.6–1.1)
KETONES UR QL STRIP: NEGATIVE
LA MAJOR: 5.59 CM
LA MINOR: 5.46 CM
LA WIDTH: 4.22 CM
LDLC SERPL CALC-MCNC: 42.8 MG/DL
LEFT ATRIUM SIZE: 3.53 CM
LEFT ATRIUM VOLUME INDEX: 28 ML/M2
LEFT ATRIUM VOLUME: 69.95 CM3
LEFT INTERNAL DIMENSION IN SYSTOLE: 3.75 CM (ref 2.1–4)
LEFT VENTRICLE DIASTOLIC VOLUME INDEX: 51.92 ML/M2
LEFT VENTRICLE DIASTOLIC VOLUME: 129.79 ML
LEFT VENTRICLE MASS INDEX: 80.3 G/M2
LEFT VENTRICLE SYSTOLIC VOLUME INDEX: 24 ML/M2
LEFT VENTRICLE SYSTOLIC VOLUME: 60 ML
LEFT VENTRICULAR INTERNAL DIMENSION IN DIASTOLE: 5.2 CM (ref 3.5–6)
LEFT VENTRICULAR MASS: 200.67 G
LEUKOCYTE ESTERASE UR QL STRIP: NEGATIVE
LIPASE SERPL-CCNC: 4 U/L
LV LATERAL E/E' RATIO: 4.88
LV SEPTAL E/E' RATIO: 11.14
LYMPHOCYTES # BLD AUTO: 1.6 K/UL
LYMPHOCYTES NFR BLD: 13.8 %
MCH RBC QN AUTO: 29.4 PG
MCHC RBC AUTO-ENTMCNC: 31.7 G/DL
MCV RBC AUTO: 93 FL
MONOCYTES # BLD AUTO: 1.1 K/UL
MONOCYTES NFR BLD: 9.1 %
MV PEAK A VEL: 1.01 M/S
MV PEAK E VEL: 0.78 M/S
NEUTROPHILS # BLD AUTO: 8.8 K/UL
NEUTROPHILS NFR BLD: 76.1 %
NITRITE UR QL STRIP: NEGATIVE
NONHDLC SERPL-MCNC: 54 MG/DL
NRBC BLD-RTO: 0 /100 WBC
PH UR STRIP: 5 [PH] (ref 5–8)
PISA TR MAX VEL: 2.88 M/S
PLATELET # BLD AUTO: 366 K/UL
PMV BLD AUTO: 9.3 FL
POC IONIZED CALCIUM: 1.04 MMOL/L (ref 1.06–1.42)
POC TCO2 (MEASURED): 24 MMOL/L (ref 23–29)
POTASSIUM BLD-SCNC: 4.7 MMOL/L (ref 3.5–5.1)
POTASSIUM SERPL-SCNC: 4.8 MMOL/L
PROCALCITONIN SERPL IA-MCNC: 0.28 NG/ML
PROT SERPL-MCNC: 6.1 G/DL
PROT UR QL STRIP: NEGATIVE
PROTHROMBIN TIME: 11.2 SEC
PROTHROMBIN TIME: 11.2 SEC
RA MAJOR: 5.83 CM
RA WIDTH: 3.22 CM
RBC # BLD AUTO: 3.77 M/UL
RIGHT VENTRICULAR END-DIASTOLIC DIMENSION: 2.91 CM
SAMPLE: ABNORMAL
SINUS: 4.04 CM
SODIUM BLD-SCNC: 139 MMOL/L (ref 136–145)
SODIUM SERPL-SCNC: 136 MMOL/L
SP GR UR STRIP: 1.02 (ref 1–1.03)
STJ: 3.56 CM
T4 FREE SERPL-MCNC: 0.69 NG/DL
TDI LATERAL: 0.16
TDI SEPTAL: 0.07
TDI: 0.12
TR MAX PG: 33.18 MMHG
TRICUSPID ANNULAR PLANE SYSTOLIC EXCURSION: 1.59 CM
TRIGL SERPL-MCNC: 56 MG/DL
TSH SERPL DL<=0.005 MIU/L-ACNC: 2.21 UIU/ML
URN SPEC COLLECT METH UR: NORMAL
WBC # BLD AUTO: 11.62 K/UL

## 2019-01-28 PROCEDURE — 93010 EKG 12-LEAD: ICD-10-PCS | Mod: ,,, | Performed by: INTERNAL MEDICINE

## 2019-01-28 PROCEDURE — 93005 ELECTROCARDIOGRAM TRACING: CPT

## 2019-01-28 PROCEDURE — 83880 ASSAY OF NATRIURETIC PEPTIDE: CPT

## 2019-01-28 PROCEDURE — 86140 C-REACTIVE PROTEIN: CPT

## 2019-01-28 PROCEDURE — P9047 ALBUMIN (HUMAN), 25%, 50ML: HCPCS | Mod: JG | Performed by: STUDENT IN AN ORGANIZED HEALTH CARE EDUCATION/TRAINING PROGRAM

## 2019-01-28 PROCEDURE — 63600175 PHARM REV CODE 636 W HCPCS: Performed by: NURSE PRACTITIONER

## 2019-01-28 PROCEDURE — 81003 URINALYSIS AUTO W/O SCOPE: CPT

## 2019-01-28 PROCEDURE — 36415 COLL VENOUS BLD VENIPUNCTURE: CPT

## 2019-01-28 PROCEDURE — 82306 VITAMIN D 25 HYDROXY: CPT

## 2019-01-28 PROCEDURE — 63600175 PHARM REV CODE 636 W HCPCS: Performed by: HOSPITALIST

## 2019-01-28 PROCEDURE — 83036 HEMOGLOBIN GLYCOSYLATED A1C: CPT

## 2019-01-28 PROCEDURE — 80061 LIPID PANEL: CPT

## 2019-01-28 PROCEDURE — 86256 FLUORESCENT ANTIBODY TITER: CPT

## 2019-01-28 PROCEDURE — 84439 ASSAY OF FREE THYROXINE: CPT

## 2019-01-28 PROCEDURE — 99223 PR INITIAL HOSPITAL CARE,LEVL III: ICD-10-PCS | Mod: ,,, | Performed by: HOSPITALIST

## 2019-01-28 PROCEDURE — 80074 ACUTE HEPATITIS PANEL: CPT

## 2019-01-28 PROCEDURE — 96374 THER/PROPH/DIAG INJ IV PUSH: CPT

## 2019-01-28 PROCEDURE — 99284 PR EMERGENCY DEPT VISIT,LEVEL IV: ICD-10-PCS | Mod: ,,, | Performed by: NURSE PRACTITIONER

## 2019-01-28 PROCEDURE — 80053 COMPREHEN METABOLIC PANEL: CPT

## 2019-01-28 PROCEDURE — 99285 EMERGENCY DEPT VISIT HI MDM: CPT | Mod: 25

## 2019-01-28 PROCEDURE — 63600175 PHARM REV CODE 636 W HCPCS: Mod: JG | Performed by: STUDENT IN AN ORGANIZED HEALTH CARE EDUCATION/TRAINING PROGRAM

## 2019-01-28 PROCEDURE — 84443 ASSAY THYROID STIM HORMONE: CPT

## 2019-01-28 PROCEDURE — 25000003 PHARM REV CODE 250: Performed by: STUDENT IN AN ORGANIZED HEALTH CARE EDUCATION/TRAINING PROGRAM

## 2019-01-28 PROCEDURE — 93010 ELECTROCARDIOGRAM REPORT: CPT | Mod: ,,, | Performed by: INTERNAL MEDICINE

## 2019-01-28 PROCEDURE — 85610 PROTHROMBIN TIME: CPT

## 2019-01-28 PROCEDURE — 12000002 HC ACUTE/MED SURGE SEMI-PRIVATE ROOM

## 2019-01-28 PROCEDURE — 84145 PROCALCITONIN (PCT): CPT

## 2019-01-28 PROCEDURE — 99284 EMERGENCY DEPT VISIT MOD MDM: CPT | Mod: ,,, | Performed by: NURSE PRACTITIONER

## 2019-01-28 PROCEDURE — 85025 COMPLETE CBC W/AUTO DIFF WBC: CPT

## 2019-01-28 PROCEDURE — 25500020 PHARM REV CODE 255: Performed by: EMERGENCY MEDICINE

## 2019-01-28 PROCEDURE — 83690 ASSAY OF LIPASE: CPT

## 2019-01-28 PROCEDURE — 86703 HIV-1/HIV-2 1 RESULT ANTBDY: CPT

## 2019-01-28 PROCEDURE — 85610 PROTHROMBIN TIME: CPT | Mod: 91

## 2019-01-28 PROCEDURE — 99223 1ST HOSP IP/OBS HIGH 75: CPT | Mod: ,,, | Performed by: HOSPITALIST

## 2019-01-28 RX ORDER — FUROSEMIDE 10 MG/ML
20 INJECTION INTRAMUSCULAR; INTRAVENOUS
Status: COMPLETED | OUTPATIENT
Start: 2019-01-28 | End: 2019-01-28

## 2019-01-28 RX ORDER — GLUCAGON 1 MG
1 KIT INJECTION
Status: DISCONTINUED | OUTPATIENT
Start: 2019-01-28 | End: 2019-01-28

## 2019-01-28 RX ORDER — ENOXAPARIN SODIUM 100 MG/ML
40 INJECTION SUBCUTANEOUS EVERY 12 HOURS
Status: DISCONTINUED | OUTPATIENT
Start: 2019-01-28 | End: 2019-01-28

## 2019-01-28 RX ORDER — NAPROXEN SODIUM 220 MG/1
81 TABLET, FILM COATED ORAL DAILY
Status: DISCONTINUED | OUTPATIENT
Start: 2019-01-29 | End: 2019-01-30 | Stop reason: HOSPADM

## 2019-01-28 RX ORDER — SODIUM CHLORIDE 0.9 % (FLUSH) 0.9 %
5 SYRINGE (ML) INJECTION
Status: DISCONTINUED | OUTPATIENT
Start: 2019-01-28 | End: 2019-01-30 | Stop reason: HOSPADM

## 2019-01-28 RX ORDER — INSULIN ASPART 100 [IU]/ML
0-5 INJECTION, SOLUTION INTRAVENOUS; SUBCUTANEOUS EVERY 6 HOURS PRN
Status: DISCONTINUED | OUTPATIENT
Start: 2019-01-29 | End: 2019-01-30 | Stop reason: HOSPADM

## 2019-01-28 RX ORDER — IBUPROFEN 200 MG
16 TABLET ORAL
Status: DISCONTINUED | OUTPATIENT
Start: 2019-01-28 | End: 2019-01-30 | Stop reason: HOSPADM

## 2019-01-28 RX ORDER — SODIUM CHLORIDE 0.9 % (FLUSH) 0.9 %
5 SYRINGE (ML) INJECTION
Status: CANCELLED | OUTPATIENT
Start: 2019-01-28

## 2019-01-28 RX ORDER — ATORVASTATIN CALCIUM 20 MG/1
40 TABLET, FILM COATED ORAL DAILY
Status: DISCONTINUED | OUTPATIENT
Start: 2019-01-29 | End: 2019-01-30 | Stop reason: HOSPADM

## 2019-01-28 RX ORDER — IBUPROFEN 200 MG
24 TABLET ORAL
Status: DISCONTINUED | OUTPATIENT
Start: 2019-01-28 | End: 2019-01-30 | Stop reason: HOSPADM

## 2019-01-28 RX ORDER — RAMIPRIL 2.5 MG/1
2.5 CAPSULE ORAL DAILY
Status: DISCONTINUED | OUTPATIENT
Start: 2019-01-28 | End: 2019-01-30 | Stop reason: HOSPADM

## 2019-01-28 RX ORDER — POLYETHYLENE GLYCOL 3350 17 G/17G
1 POWDER, FOR SOLUTION ORAL DAILY
Status: DISCONTINUED | OUTPATIENT
Start: 2019-01-28 | End: 2019-01-29

## 2019-01-28 RX ORDER — ENOXAPARIN SODIUM 100 MG/ML
40 INJECTION SUBCUTANEOUS EVERY 12 HOURS
Status: DISCONTINUED | OUTPATIENT
Start: 2019-01-29 | End: 2019-01-29

## 2019-01-28 RX ORDER — CYANOCOBALAMIN (VITAMIN B-12) 250 MCG
250 TABLET ORAL DAILY
Status: DISCONTINUED | OUTPATIENT
Start: 2019-01-29 | End: 2019-01-30 | Stop reason: HOSPADM

## 2019-01-28 RX ORDER — ALBUMIN HUMAN 250 G/1000ML
12.5 SOLUTION INTRAVENOUS ONCE
Status: COMPLETED | OUTPATIENT
Start: 2019-01-28 | End: 2019-01-28

## 2019-01-28 RX ORDER — INSULIN ASPART 100 [IU]/ML
6 INJECTION, SOLUTION INTRAVENOUS; SUBCUTANEOUS
COMMUNITY
End: 2020-10-19

## 2019-01-28 RX ORDER — GLUCAGON 1 MG
1 KIT INJECTION
Status: DISCONTINUED | OUTPATIENT
Start: 2019-01-29 | End: 2019-01-30 | Stop reason: HOSPADM

## 2019-01-28 RX ORDER — INSULIN ASPART 100 [IU]/ML
1-10 INJECTION, SOLUTION INTRAVENOUS; SUBCUTANEOUS
Status: DISCONTINUED | OUTPATIENT
Start: 2019-01-28 | End: 2019-01-28

## 2019-01-28 RX ORDER — METOPROLOL TARTRATE 50 MG/1
50 TABLET ORAL 2 TIMES DAILY
Status: DISCONTINUED | OUTPATIENT
Start: 2019-01-28 | End: 2019-01-30

## 2019-01-28 RX ORDER — ALBUMIN HUMAN 250 G/1000ML
25 SOLUTION INTRAVENOUS ONCE
Status: COMPLETED | OUTPATIENT
Start: 2019-01-28 | End: 2019-01-28

## 2019-01-28 RX ADMIN — ALBUMIN HUMAN 25 G: 0.25 SOLUTION INTRAVENOUS at 03:01

## 2019-01-28 RX ADMIN — IOHEXOL 100 ML: 350 INJECTION, SOLUTION INTRAVENOUS at 10:01

## 2019-01-28 RX ADMIN — PANCRELIPASE 1 CAPSULE: 60000; 12000; 38000 CAPSULE, DELAYED RELEASE PELLETS ORAL at 07:01

## 2019-01-28 RX ADMIN — HUMAN ALBUMIN MICROSPHERES AND PERFLUTREN 3 ML: 10; .22 INJECTION, SOLUTION INTRAVENOUS at 04:01

## 2019-01-28 RX ADMIN — CEFTRIAXONE 2 G: 2 INJECTION, SOLUTION INTRAVENOUS at 11:01

## 2019-01-28 RX ADMIN — ALBUMIN (HUMAN) 12.5 G: 25 SOLUTION INTRAVENOUS at 10:01

## 2019-01-28 RX ADMIN — FUROSEMIDE 20 MG: 10 INJECTION, SOLUTION INTRAVENOUS at 10:01

## 2019-01-28 NOTE — HPI
Patient is a 63-year-old male with medical history of CAD (5 stents on ASA plavix), HLD (on statin), Charcot foot, DMII (on insulin), BERHANE (on apap 10-20 QHS), sleeve gastrectomy (12/16), perianal cyst, right knee degenerative changes (hyaluronic acid injections via ortho X3), questionable hx of lymphedema.     Presenting to the ED for left-sided upper abdominal pain, abdominal distention, bilateral lower extremity edema.  Mr CAMPBELL had a bad episode of pancreatitis in September 2018 requiring inpatient stay for 5 days.  His symptoms included abdominal pain in the midepigastric region that radiated to his back and loose stools, CT scan showed severe inflammation, the etiology was thought to be alcohol.   He has occasional diarrhea and loose stool.  He is taking Creon for that, but is not following a low fat diet.  His blood sugar was noted to be uncontrolled during that admission.    Pt denies fever, no chills, no chest pain, no SOB, no purulent draining or erythema of legs.

## 2019-01-28 NOTE — TELEPHONE ENCOUNTER
----- Message from Radha Bernard sent at 1/25/2019  9:45 AM CST -----  Contact: Patient and His wife Leola   Good Morning,                          Mr. Villar is currently a Pt at [facility] however he is interested in establishing care here at [facility]. I saw that  ordered 's CT and he's requesting referrals to speciality clinics etc. He also requested to have someone from 's office reach out to him, his preferred number is [phone]. Thanks

## 2019-01-28 NOTE — ED NOTES
Patient resting in stretcher. Pt is awake alert and oriented x4, respirations even and unlabored. Pt denies pain at this time. Pt calm. Environment safe.  Pt aware of POC. Bed low and locked. Legs elevated

## 2019-01-28 NOTE — ED NOTES
LOC: The patient is awake, alert, and oriented to place, time, situation. Affect is appropriate.  Speech is appropriate and clear.     APPEARANCE: Patient resting uncomfortably, reporting abdominal pain that started 3 days ago, in no acute distress.  Patient is clean and well groomed.    SKIN: The skin is warm, legs are oozing clear fluid, pitting edema and cellulitis to calves. Chronic lymphedema. No pedal pulse with doppler.    MUSCULOSKELETAL: Patient moving  lower extremities with difficulty. Lymphedema.  Obese.      RESPIRATORY: Airway is open and patent. Respirations spontaneous, non-labored at rest.  Exertion increases rate and effort.  No accessory muscle use noted. Denies cough.     CARDIAC:  ST. Pitting peripheral edema lower extremities  noted. No complaints of chest pain.      ABDOMEN: Obese, Soft  And tender to palpation.     NEUROLOGIC: Eyes open spontaneously.  Behavior appropriate to situation.  Follows commands; facial expression symmetrical.  Purposeful motor response noted.

## 2019-01-28 NOTE — HOSPITAL COURSE
In ED, patient given lasix 20 IV which produced good UOP.  CXR was unconcerning, specifically there was no evidence of pulmonary edema.  CT abdo revealed cirrhosis, anasarca and increased volume ascites.  TTE reveal HFrEF EF 30% with diastolic dysfunction.  Paracentesis to be performed by IR, unable to identify pocket on floor.  Added lipase and amylase to para labs to search for possible extravasation of pancreatic pseudocyst fluids into ascites.  Hepatitis panel negative and wife confirmed longstanding alcohol intake, likely contributing to cirrhosis.  Diuresing ongoing...      Hb drop from admit 11.1 to 8.5, held lovenox and trending CBCs.

## 2019-01-28 NOTE — ASSESSMENT & PLAN NOTE
Continue lipitor, but at reduced dose of 40mg rather than home 80mg due to chronic liver disease/ cirrhosis

## 2019-01-28 NOTE — SUBJECTIVE & OBJECTIVE
Past Medical History:   Diagnosis Date    Anasarca 1/28/2019    Atherosclerosis     Cancer     skin cancer    Charcot foot due to diabetes mellitus     Charcot foot due to diabetes mellitus     Chest pain     Chronic pancreatitis 1/28/2019    Cirrhosis of liver with ascites 1/28/2019    Colon polyps     approx 5 yrs ago    Coronary artery disease     Diabetes mellitus     Diverticulosis 1/28/2019    Gastric ulcer     Hydrocele     approx 1.5 yrs ago    Hyperlipidemia     Hypertension 1/28/2019    Lymphedema     Perianal cyst     approx 2 yrs ago    Pseudocyst of pancreas 1/28/2019       Past Surgical History:   Procedure Laterality Date    ANGIOPLASTY      total x5 stents    CARDIAC SURGERY      COLONOSCOPY N/A 10/6/2015    Performed by Shekhar Richards MD at [facility] ENDO (2ND FLR)    CYST REMOVAL      GASTRECTOMY      GASTRECTOMY-SLEEVE-LAPAROSCOPIC - 87827 N/A 12/22/2015    Performed by Micheal Villavicencio Jr., MD at [facility] OR 2ND FLR    KNEE ARTHROSCOPY      perianal surgery      perianal cyst removed       Review of patient's allergies indicates:  No Known Allergies    No current facility-administered medications on file prior to encounter.      Current Outpatient Medications on File Prior to Encounter   Medication Sig    aspirin 325 MG tablet Take 325 mg by mouth once daily.    calcium citrate-vitamin D3 500 mg calcium -400 unit Chew Take 1 tablet by mouth 3 (three) times daily.    ezetimibe (ZETIA) 10 mg tablet Take 10 mg by mouth once daily.    hydrocodone-acetaminophen 5-325mg (NORCO) 5-325 mg per tablet Take 1 tablet by mouth every 6 (six) hours as needed for Pain.    insulin aspart protamine-insulin aspart (NOVOLOG 70/30) 100 unit/mL (70-30) InPn pen Inject into the skin 2 (two) times daily before meals.    insulin detemir (LEVEMIR FLEXPEN SUBQ) Inject into the skin.    lipase-protease-amylase (CREON) 36,000-114,000- 180,000 unit CpDR Take by mouth 3 (three) times daily.    ramipril  (ALTACE) 2.5 MG capsule Take 2.5 mg by mouth once daily.    atorvastatin (LIPITOR) 80 MG tablet Take 80 mg by mouth once daily.    cyanocobalamin, vitamin B-12, 500 mcg Subl Place 1 tablet under the tongue once daily.    nystatin (MYCOSTATIN) cream Apply topically 3 (three) times daily.    omeprazole (PRILOSEC) 40 MG capsule Take 1 capsule (40 mg total) by mouth once daily.    ONETOUCH ULTRA TEST Strp 4 (four) times daily. Use to test blood sugar four times a day.    pediatric multivit-iron-min Chew Take 1 tablet by mouth 2 (two) times daily.    polyethylene glycol (GLYCOLAX) 17 gram PwPk Take 1 g by mouth once daily.    ursodiol (ESTEFANIA FORTE) 500 MG tablet Take 1 tablet (500 mg total) by mouth once daily. Crush tablet and take daily.  Okay to compound into liquid at patient's request.    [DISCONTINUED] clopidogrel (PLAVIX) 75 mg tablet Take 75 mg by mouth once daily.    [DISCONTINUED] doxycycline (MONODOX) 100 MG capsule Take 1 capsule (100 mg total) by mouth 2 (two) times daily.    [DISCONTINUED] glimepiride (AMARYL) 4 MG tablet Take 4 mg by mouth 2 (two) times daily. Pt was advised to hold by Endocrinologist, Dr Jaime Boo at     [DISCONTINUED] metoprolol tartrate (LOPRESSOR) 50 MG tablet Take 50 mg by mouth 2 (two) times daily.    [DISCONTINUED] ondansetron (ZOFRAN-ODT) 8 MG TbDL Take 1 tablet (8 mg total) by mouth every 6 (six) hours as needed (nausea/vomiting).    [DISCONTINUED] sulfamethoxazole-trimethoprim 800-160mg (BACTRIM DS) 800-160 mg Tab Take 1 tablet by mouth 2 (two) times daily.     Family History     Problem Relation (Age of Onset)    Cancer Mother, Father, Paternal Grandfather, Brother    Diabetes Maternal Grandmother    No Known Problems Paternal Grandmother    Obesity Sister    Parkinsonism Brother    Stroke Maternal Grandfather        Tobacco Use    Smoking status: Former Smoker     Packs/day: 2.00     Years: 30.00     Pack years: 60.00     Types: Cigarettes     Last attempt  to quit: 2005     Years since quittin.9    Smokeless tobacco: Never Used   Substance and Sexual Activity    Alcohol use: Yes     Alcohol/week: 0.0 - 0.6 oz    Drug use: No    Sexual activity: Yes     Review of Systems   Constitutional: Positive for activity change. Negative for appetite change, chills, diaphoresis, fatigue and fever.   HENT: Negative for congestion.    Respiratory: Negative for cough, chest tightness and shortness of breath.    Gastrointestinal: Positive for abdominal distention and abdominal pain.   Genitourinary: Negative for difficulty urinating, dysuria and frequency.   Neurological: Negative for dizziness, light-headedness and headaches.   Psychiatric/Behavioral: Negative for agitation, behavioral problems, confusion and decreased concentration.     Objective:     Vital Signs (Most Recent):  Temp: 97.5 °F (36.4 °C) (19 0831)  Pulse: 102 (19 1141)  Resp: 18 (19 1141)  BP: 117/66 (19 1141)  SpO2: 100 % (19 1141) Vital Signs (24h Range):  Temp:  [97.5 °F (36.4 °C)] 97.5 °F (36.4 °C)  Pulse:  [100-112] 102  Resp:  [16-20] 18  SpO2:  [100 %] 100 %  BP: (109-134)/(65-72) 117/66     Weight: (!) 149.7 kg (330 lb)  Body mass index is 51.69 kg/m².    Physical Exam   Constitutional: He is oriented to person, place, and time. He appears well-developed and well-nourished. No distress.   HENT:   Head: Normocephalic and atraumatic.   Eyes: EOM are normal.   Neck: Normal range of motion. Neck supple.   Abdominal: Soft. Bowel sounds are normal. He exhibits distension. There is tenderness. There is no guarding.   Significantly enlarged, distended abdomen  No guarding, no rebound   Musculoskeletal:   Reduced range of motion due to morbid obesity   Neurological: He is alert and oriented to person, place, and time.   Skin: Skin is warm and dry. He is not diaphoretic.   2+ bilateral pitting edema to mid thigh  Minimal fluid weeping bilateral lower extermities   Psychiatric:  He has a normal mood and affect. His behavior is normal.         CRANIAL NERVES     CN III, IV, VI   Extraocular motions are normal.        Significant Labs:   A1C: No results for input(s): HGBA1C in the last 4320 hours.  Bilirubin:   Recent Labs   Lab 01/28/19  0847   BILITOT 0.5     CBC:   Recent Labs   Lab 01/28/19  0847 01/28/19  0855   WBC 11.62  --    HGB 11.1*  --    HCT 35.0* 32*   *  --      CMP:   Recent Labs   Lab 01/28/19  0847      K 4.8      CO2 22*   GLU 89   BUN 18   CREATININE 0.9   CALCIUM 7.9*   PROT 6.1   ALBUMIN 1.6*   BILITOT 0.5   ALKPHOS 129   AST 24   ALT 15   ANIONGAP 6*   EGFRNONAA >60.0     Cardiac Markers:   Recent Labs   Lab 01/28/19  0940   BNP 89     Coagulation:   Recent Labs   Lab 01/28/19  0940   INR 1.1     Lactic Acid: No results for input(s): LACTATE in the last 48 hours.  Lipase:   Recent Labs   Lab 01/28/19  0847   LIPASE 4       Significant Imaging:     CXR 1/28/19  - No acute findings, no pulmonary congestion      CT abdo/pelvis 1/28/19  - Liver cirrhotic morphology - interval increase in ascites   - Hypodense air-fluid collection along the body of the pancreas - may relate to pancreatic necrosis with pancreatic pseudocysts with infected pseudocyst not excluded  - Diffuse anasarca  - Sleeve gastrectomy.  - Extensive atherosclerosis

## 2019-01-28 NOTE — ASSESSMENT & PLAN NOTE
PSEUDOCYST OF PANCREAS    - Initial acute pancreatitis episode September 2018, 5 day inpatient hospital stay  - Scheduled for re-imaging day of admission, however pt came to [facility] ED as he felt his current state (anasarca, abdominal ascites, LE edema) prevented him form making that appointment      Plan:  - Pt not presenting as toxic, no leukocytosis, no hypotension, no red flag exam findings       CT abdo/pelvis 1/28/19  - Liver cirrhotic morphology - interval increase in ascites   - Hypodense air-fluid collection along the body of the pancreas - may relate to pancreatic necrosis with pancreatic pseudocysts with infected pseudocyst not excluded  - Diffuse anasarca  - Sleeve gastrectomy.  - Extensive atherosclerosis

## 2019-01-28 NOTE — ED PROVIDER NOTES
Encounter Date: 1/28/2019       History     Chief Complaint   Patient presents with    Edema     lymph edema to lower ext     Patient is a 63-year-old male with medical history of Charcot foot, diabetes, lymphedema presenting to the ED for left-sided upper abdominal pain, abdominal distention, bilateral lower extremity edema.            Review of patient's allergies indicates:  No Known Allergies  Past Medical History:   Diagnosis Date    Anasarca 1/28/2019    Atherosclerosis     Cancer     skin cancer    Charcot foot due to diabetes mellitus     Charcot foot due to diabetes mellitus     Chest pain     Chronic pancreatitis 1/28/2019    Cirrhosis of liver with ascites 1/28/2019    Colon polyps     approx 5 yrs ago    Coronary artery disease     Diabetes mellitus     Diverticulosis 1/28/2019    Gastric ulcer     Hydrocele     approx 1.5 yrs ago    Hyperlipidemia     Hypertension 1/28/2019    Lymphedema     Perianal cyst     approx 2 yrs ago    Pseudocyst of pancreas 1/28/2019     Past Surgical History:   Procedure Laterality Date    ANGIOPLASTY      total x5 stents    CARDIAC SURGERY      COLONOSCOPY N/A 10/6/2015    Performed by [facility]khar Richards [facility] at [facility] ENDO (2ND FLR)    CYST REMOVAL      GASTRECTOMY      GASTRECTOMY-SLEEVE-LAPAROSCOPIC - 71511 N/A 12/22/2015    Performed by [facility]al Villavicencio Jr., [facility] at [facility] OR 2ND FLR    KNEE ARTHROSCOPY      perianal surgery      perianal cyst removed     Family History   Problem Relation Age of Onset    Cancer Mother     Cancer Father     Obesity Sister     Parkinsonism Brother     No Known Problems Paternal Grandmother     Cancer Paternal Grandfather     Cancer Brother     Diabetes Maternal Grandmother     Stroke Maternal Grandfather      Social History     Tobacco Use    Smoking status: Former Smoker     Packs/day: 2.00     Years: 30.00     Pack years: 60.00     Types: Cigarettes     Last attempt to quit: 2/1/2005     Years since quitting:  13.9    Smokeless tobacco: Never Used   Substance Use Topics    Alcohol use: Yes     Alcohol/week: 0.0 - 0.6 oz    Drug use: No     Review of Systems   Constitutional: Positive for activity change, appetite change and fatigue. Negative for chills and fever.   HENT: Negative for congestion and sore throat.    Respiratory: Positive for shortness of breath. Negative for cough and chest tightness.    Cardiovascular: Positive for leg swelling ( bilateral). Negative for chest pain and palpitations.   Gastrointestinal: Positive for abdominal pain. Negative for constipation, diarrhea, nausea and vomiting.   Genitourinary: Negative for decreased urine volume, difficulty urinating, dysuria and urgency.   Musculoskeletal: Positive for gait problem ( due to edema) and myalgias. Negative for arthralgias and back pain.   Skin: Positive for wound ( BLE weeping). Negative for rash.   Neurological: Negative for dizziness, syncope, weakness, numbness and headaches.   All other systems reviewed and are negative.      Physical Exam     Initial Vitals [01/28/19 0831]   BP Pulse Resp Temp SpO2   109/72 (!) 112 20 97.5 °F (36.4 °C) 100 %      MAP       --         Physical Exam    Nursing note and vitals reviewed.  Constitutional: He appears well-developed and well-nourished. He is Obese . He is cooperative.   HENT:   Head: Normocephalic and atraumatic.   Mouth/Throat: Uvula is midline, oropharynx is clear and moist and mucous membranes are normal.   Eyes: Conjunctivae, EOM and lids are normal. Pupils are equal, round, and reactive to light.   Neck: Trachea normal, normal range of motion, full passive range of motion without pain and phonation normal. Neck supple. No spinous process tenderness and no muscular tenderness present. No JVD present.   Cardiovascular: Regular rhythm. Tachycardia present.    Pulses:       Radial pulses are 2+ on the right side, and 2+ on the left side.        Dorsalis pedis pulses are 1+ on the right side, and  1+ on the left side.   Pulmonary/Chest: Effort normal. He has decreased breath sounds.   Abdominal: Normal appearance and bowel sounds are normal. He exhibits distension and ascites. There is no tenderness. There is no rigidity, no rebound and no guarding.   Musculoskeletal: Normal range of motion.        Right lower leg: He exhibits tenderness and edema.        Left lower leg: He exhibits tenderness and edema.   Bilateral lower extremity +3 pedal edema.  Weeping wounds noted.   Neurological: He is alert and oriented to person, place, and time. He has normal strength. GCS eye subscore is 4. GCS verbal subscore is 5. GCS motor subscore is 6.   Skin: Skin is warm, dry and intact. Capillary refill takes more than 3 seconds. No abrasion, no laceration and no rash noted. No cyanosis. Nails show no clubbing.         ED Course   Procedures  Labs Reviewed   CBC W/ AUTO DIFFERENTIAL - Abnormal; Notable for the following components:       Result Value    RBC 3.77 (*)     Hemoglobin 11.1 (*)     Hematocrit 35.0 (*)     MCHC 31.7 (*)     RDW 14.6 (*)     Platelets 366 (*)     Gran # (ANC) 8.8 (*)     Mono # 1.1 (*)     Gran% 76.1 (*)     Lymph% 13.8 (*)     All other components within normal limits   COMPREHENSIVE METABOLIC PANEL - Abnormal; Notable for the following components:    CO2 22 (*)     Calcium 7.9 (*)     Albumin 1.6 (*)     Anion Gap 6 (*)     All other components within normal limits   ISTAT PROCEDURE - Abnormal; Notable for the following components:    POC Ionized Calcium 1.04 (*)     POC Hematocrit 32 (*)     All other components within normal limits   LIPASE   URINALYSIS, REFLEX TO URINE CULTURE    Narrative:     Preferred Collection Type->Urine, Clean Catch   PROTIME-INR   B-TYPE NATRIURETIC PEPTIDE   ISTAT CHEM8          Imaging Results          CT Abdomen Pelvis With Contrast (Final result)     Abnormal  Result time 01/28/19 10:51:09    Final result by Isrrael Newman MD (01/28/19 10:51:09)                  Impression:      Liver has a cirrhotic morphology with no focal lesions.  Significant interval increase in ascites when compared to prior exam which may account for patient's abdominal distension.    Hypodense air-fluid collection along the body of the pancreas which is slightly smaller when compared to prior CT.  Findings may relate to pancreatic necrosis with pancreatic pseudocysts with infected pseudocyst not excluded.  Mass effect with narrowing of the portal confluence in the region although the vessels appear patent.    Diffuse anasarca.    Sleeve gastrectomy.    Extensive atherosclerosis.    This report was flagged in Epic as abnormal.      Electronically signed by: Isrrael Newman MD  Date:    01/28/2019  Time:    10:51             Narrative:    EXAMINATION:  CT ABDOMEN PELVIS WITH CONTRAST    CLINICAL HISTORY:  Abdominal distension;    TECHNIQUE:  Low dose axial images, sagittal and coronal reformations were obtained from the lung bases to the pubic symphysis following the IV administration of 100 mL of Omnipaque 350 .  Oral contrast was not given.    COMPARISON:  Outside examination of 11/16/2018    FINDINGS:  Heart is not enlarged.  There is extensive coronary atherosclerosis.  Visualized portions of the lung bases demonstrate no acute abnormalities.    There is a cirrhotic morphology to the liver with a nodular contour.  No focal liver lesions are noted on single phase of imaging.  No significant intrahepatic biliary ductal dilatation.  Gallbladder is mildly distended but shows no evidence of radiopaque Stones or wall thickening.  There are postoperative changes of sleeve gastrectomy.  There is a 9.3 x 3.9 x 3.4 cm hypodense air-fluid collection centered around the body of the pancreas.  The portal vein is narrowed near the confluence of the splenic vein and SMV although these vessels appear patent.    Spleen is upper normal in size.    Small and large bowel show no evidence of obstruction.  There is no  free air.  Appendix not definitely visualized.  Tubular air-filled structure in the right lower abdomen is possibly the appendix and appears within normal limits.  There is diverticulosis.    Kidneys have a lobular contour with vascular calcifications.  No evidence of hydronephrosis.  Kidneys concentrate excrete contrast on delayed images.  Urinary bladder is nondistended but grossly unremarkable.    Significant interval increase in ascites when compared to prior exam.  Ascites measures slightly higher in attenuation than simple fluid although this may be in part due to artifact from patient's thick body wall and anasarca.    Aorta is normal in caliber with severe atherosclerosis.  There is diffuse anasarca.  Bones demonstrate no acute abnormalities.                               X-Ray Chest PA And Lateral (Final result)  Result time 01/28/19 09:34:06    Final result by Ab Smith III, MD (01/28/19 09:34:06)                 Impression:      See above    No acute process seen.      Electronically signed by: Ab Smith MD  Date:    01/28/2019  Time:    09:34             Narrative:    EXAMINATION:  XR CHEST PA AND LATERAL    CLINICAL HISTORY:  Shortness of breath    FINDINGS:  There is postoperative change.  Heart size is normal.  Lungs are clear.                                       APC / Resident Notes:   Emergent evaluation of a 64 yo male patient presenting to the ER with chief complaint of left sided upper abdominal pain, abdominal distension.  Pt states he is currently being seen at [facility].  Patient states he is interested in transversing care over to [facility].  Patient states he has fist to have a CT scan done at [facility] today but was unable to make it to that appointment.  On exam patient A&O x3.  Cap refill greater than 3 sec.  Abdomen is distended and hard.  Ascites is noted.  Bowel sounds within normal limits.  No JVD noted. Plus three pedal edema noted.  Some redness noted.   Patient states this is chronic but getting worse over the past few weeks.  Strength 5/5 in bilateral lower extremities.  Breath sounds diminished bilaterally.   I will get labs, imaging, medicate and reassess.  Differential diagnoses include but are not limited to cirrhosis, pancreatitis, gastritis, gastroenteritis, CHF, DVT, pulmonary embolism, venous insufficiency, cirrhosis, low albumin, cellulitis, musculoskeletal, others  I discussed the care of this patient with my Supervising Physician.      Patient's CBC unremarkable. The no leukocytosis seen.  H&H stable at 11.1 and 35.  CMP unremarkable. Lipase negative at 4.  CT abdomen pelvis shows liver has a cirrhotic morphology with no focal lesions.  Significant interval increase in ascites when compared to prior exam which may account for patient's abdominal distension.    Spoke to Hospital Medicine.  Will admit patient for in-patient care and cirrhotic workup.  Patient updated on plan of care.  All questions and concerns addressed.                   Clinical Impression:   The primary encounter diagnosis was Other ascites. Diagnoses of Swelling, SOB (shortness of breath), Abdominal pain, Hepatic cirrhosis, unspecified hepatic cirrhosis type, unspecified whether ascites present, Bilateral edema of lower extremity, Atherosclerosis, and Gastric ulcer, unspecified chronicity, unspecified whether gastric ulcer hemorrhage or perforation present were also pertinent to this visit.      Disposition:   Disposition: Admitted  Condition: Stable                        Maxine Harper NP  01/28/19 1203

## 2019-01-28 NOTE — ASSESSMENT & PLAN NOTE
- Hx 5 stents  - Hx CABG    - Continue ASA 81 (pt reportedly taking daily 325)  - Continue home plavix 75  - Continue lipitor 40mg daily (was on 80 daily, reducing due to chronic liver dysfunction)  - Continue hoome lopressor 50mg BID  - Continue home ramipril 2.5mg

## 2019-01-28 NOTE — MEDICAL/APP STUDENT
Mr. Jian Arrieta is a 63M with a PMHx of charcot foot, diabetes (dx 5 years ago), lymphedema, hydrocele, pancreatitis (last episode in 9/2018), pancreatic pseudocyst, cholelithiasis (12/2018) and arthritis of his knees who presented to ED for periumbilical and right sided flank pain, abdominal distention, and bilateral lower extremity edema that worsened 3-4 days ago. He notes fatigue, SOB but no cough, fever or sweats. He also notes abdominal pain only on palpation but no tenderness otherwise. He is positive for diarrhea that initially began since 2018 but constipation within the past day or so, urinary incontinence, and nausea with no vomiting. He has weeping ulcers on the back of his right leg and left shin. He on his way to [place] to receive a CT scan of his pancreas but decided to come to [facility] for a proper workup due to fatigued and inability to drive. He needed extra help to get into a taxi (which is unusual for him). He is a retired  since 4 years ago and notes his health problems have worsened since then. His presenting complaint of abdominal pain initially started in 9/2018 with associated sx of SOB, no appetite, diaphoresis, n/v, and diarrhea yellow in color. He was later dx with necrotizing pancreatitis and also pseudocyst.       PMHx:  - charcot foot due to DM  - atherosclerosis  - skin cancer  - anasarca   - DM (2014)  - chronic pancreatitis  - liver cirrhosis with ascites (2019)  - colon polyps (2014)  - diverticulosis  - gastric ulcer  - hydrocele  - hyperlipidemia  - HTN  - lymphedema  - perianal cyst (2017)  - pancreatic pseudocyst (2019)  - BMI 51.69    PSurgHx:  - angioplasty (total of 5 stents)   - triple bypass (2018)  - cyst removal   - knee arthroscopy   - perianal surgery (cyst removal)    FHx:  - mother - bone cancer?  - father - brain cancer  - brother - multiple myeloma, parkinsonism    Social Hx:  - Alcohol: says he quit drinking on one account, but also stated wife says he  ""drinks more than he should"  - Tobacco: Former smoker of 48 years with a 96 pack-year history   - Rec drugs: marijuana     Meds:  - Atorvastatin 80mg  - Ramipril 2.5mg  - Zetia 10mg  - Aspirin 325mg  - Calcium gummy 4  - Leveramier   - Novalog 4/4/4 breakfast, lunch, supper    VS:  Tmax: 97.5  NV: 100-112   RR: 16-20 MR 20  BP sys: 102-134   BP espinal: 56-72 /56  O2: 97%-100% MR 97% on room air      PE:  General: AOx3, comfortable, no respiratory distress, obese   CVS: lower extremity edema,   Resp:  Abdo: Abdominal distention,     Labs:                  A/P:  1) Abdominal pain:  - pancreatitis - check amylase, lipase  - cirrhosis - USS, check hepatitis B,C antibodies    2) SOB, Fatigue:  - PE: D-dimer, USS   "

## 2019-01-28 NOTE — ASSESSMENT & PLAN NOTE
ASCITES  ANASARCA    - Pt severely fluid overloaded  - Cirrhotic liver appearance on admission CT abdo/pelvis  - Questionable etiology, possible alcohol involvement but unclear at this time      Plan:  - Non-alcoholic cirrhosis workup -- Hep panel, anti-smooth muscle  - Diagnostic and therapeutic paracentesis - reportedly never had a tap before thus will take off maximum 3-5 liters, SBP labs

## 2019-01-28 NOTE — H&P
[facility] Hospital Medicine  History & Physical    Patient Name: Jian Arrieta  MRN: [id number]  Admission Date: 1/28/2019  Attending Physician: Severiano Gonsalez MD   Primary Care Provider: Samir Mahmood MD    [facility] Medicine Team: [facility] Nick Oreilly MD     Patient information was obtained from patient, spouse/SO and ER records.     Subjective:     Principal Problem:Chronic pancreatitis    Chief Complaint:   Chief Complaint   Patient presents with    Edema     lymph edema to lower ext        HPI: Patient is a 63-year-old male with medical history of CAD (5 stents on ASA plavix), HLD (on statin), Charcot foot, DMII (on insulin), BERHANE (on apap 10-20 QHS), sleeve gastrectomy (12/16), perianal cyst, right knee degenerative changes (hyaluronic acid injections via ortho X3), questionable hx of lymphedema.     Presenting to the ED for left-sided upper abdominal pain, abdominal distention, bilateral lower extremity edema.  Mr CAMPBELL had a bad episode of pancreatitis in September 2018 requiring inpatient stay for 5 days.  His symptoms included abdominal pain in the midepigastric region that radiated to his back and loose stools, CT scan showed severe inflammation, the etiology was thought to be alcohol.   He has occasional diarrhea and loose stool.  He is taking Creon for that, but is not following a low fat diet.  His blood sugar was noted to be uncontrolled during that admission.    Pt denies fever, no chills, no chest pain, no SOB, no purulent draining or erythema of legs.           Past Medical History:   Diagnosis Date    Anasarca 1/28/2019    Atherosclerosis     Cancer     skin cancer    Charcot foot due to diabetes mellitus     Charcot foot due to diabetes mellitus     Chest pain     Chronic pancreatitis 1/28/2019    Cirrhosis of liver with ascites 1/28/2019    Colon polyps     approx 5 yrs ago    Coronary artery disease     Diabetes mellitus     Diverticulosis 1/28/2019     Gastric ulcer     Hydrocele     approx 1.5 yrs ago    Hyperlipidemia     Hypertension 1/28/2019    Lymphedema     Perianal cyst     approx 2 yrs ago    Pseudocyst of pancreas 1/28/2019       Past Surgical History:   Procedure Laterality Date    ANGIOPLASTY      total x5 stents    CARDIAC SURGERY      COLONOSCOPY N/A 10/6/2015    Performed by Shekhar Richards MD at [facility] ENDO (2ND FLR)    CYST REMOVAL      GASTRECTOMY      GASTRECTOMY-SLEEVE-LAPAROSCOPIC - 58258 N/A 12/22/2015    Performed by Micheal Villavicencio Jr., MD at [facility] OR 2ND FLR    KNEE ARTHROSCOPY      perianal surgery      perianal cyst removed       Review of patient's allergies indicates:  No Known Allergies    No current facility-administered medications on file prior to encounter.      Current Outpatient Medications on File Prior to Encounter   Medication Sig    aspirin 325 MG tablet Take 325 mg by mouth once daily.    calcium citrate-vitamin D3 500 mg calcium -400 unit Chew Take 1 tablet by mouth 3 (three) times daily.    ezetimibe (ZETIA) 10 mg tablet Take 10 mg by mouth once daily.    hydrocodone-acetaminophen 5-325mg (NORCO) 5-325 mg per tablet Take 1 tablet by mouth every 6 (six) hours as needed for Pain.    insulin aspart protamine-insulin aspart (NOVOLOG 70/30) 100 unit/mL (70-30) InPn pen Inject into the skin 2 (two) times daily before meals.    insulin detemir (LEVEMIR FLEXPEN SUBQ) Inject into the skin.    lipase-protease-amylase (CREON) 36,000-114,000- 180,000 unit CpDR Take by mouth 3 (three) times daily.    ramipril (ALTACE) 2.5 MG capsule Take 2.5 mg by mouth once daily.    atorvastatin (LIPITOR) 80 MG tablet Take 80 mg by mouth once daily.    cyanocobalamin, vitamin B-12, 500 mcg Subl Place 1 tablet under the tongue once daily.    nystatin (MYCOSTATIN) cream Apply topically 3 (three) times daily.    omeprazole (PRILOSEC) 40 MG capsule Take 1 capsule (40 mg total) by mouth once daily.    ONETOUCH ULTRA TEST Strp 4  (four) times daily. Use to test blood sugar four times a day.    pediatric multivit-iron-min Chew Take 1 tablet by mouth 2 (two) times daily.    polyethylene glycol (GLYCOLAX) 17 gram PwPk Take 1 g by mouth once daily.    ursodiol (ESTEFANIA FORTE) 500 MG tablet Take 1 tablet (500 mg total) by mouth once daily. Crush tablet and take daily.  Okay to compound into liquid at patient's request.    [DISCONTINUED] clopidogrel (PLAVIX) 75 mg tablet Take 75 mg by mouth once daily.    [DISCONTINUED] doxycycline (MONODOX) 100 MG capsule Take 1 capsule (100 mg total) by mouth 2 (two) times daily.    [DISCONTINUED] glimepiride (AMARYL) 4 MG tablet Take 4 mg by mouth 2 (two) times daily. Pt was advised to hold by Endocrinologist, Dr Jaime Boo at     [DISCONTINUED] metoprolol tartrate (LOPRESSOR) 50 MG tablet Take 50 mg by mouth 2 (two) times daily.    [DISCONTINUED] ondansetron (ZOFRAN-ODT) 8 MG TbDL Take 1 tablet (8 mg total) by mouth every 6 (six) hours as needed (nausea/vomiting).    [DISCONTINUED] sulfamethoxazole-trimethoprim 800-160mg (BACTRIM DS) 800-160 mg Tab Take 1 tablet by mouth 2 (two) times daily.     Family History     Problem Relation (Age of Onset)    Cancer Mother, Father, Paternal Grandfather, Brother    Diabetes Maternal Grandmother    No Known Problems Paternal Grandmother    Obesity Sister    Parkinsonism Brother    Stroke Maternal Grandfather        Tobacco Use    Smoking status: Former Smoker     Packs/day: 2.00     Years: 30.00     Pack years: 60.00     Types: Cigarettes     Last attempt to quit: 2005     Years since quittin.9    Smokeless tobacco: Never Used   Substance and Sexual Activity    Alcohol use: Yes     Alcohol/week: 0.0 - 0.6 oz    Drug use: No    Sexual activity: Yes     Review of Systems   Constitutional: Positive for activity change. Negative for appetite change, chills, diaphoresis, fatigue and fever.   HENT: Negative for congestion.    Respiratory: Negative for  cough, chest tightness and shortness of breath.    Gastrointestinal: Positive for abdominal distention and abdominal pain.   Genitourinary: Negative for difficulty urinating, dysuria and frequency.   Neurological: Negative for dizziness, light-headedness and headaches.   Psychiatric/Behavioral: Negative for agitation, behavioral problems, confusion and decreased concentration.     Objective:     Vital Signs (Most Recent):  Temp: 97.5 °F (36.4 °C) (01/28/19 0831)  Pulse: 102 (01/28/19 1141)  Resp: 18 (01/28/19 1141)  BP: 117/66 (01/28/19 1141)  SpO2: 100 % (01/28/19 1141) Vital Signs (24h Range):  Temp:  [97.5 °F (36.4 °C)] 97.5 °F (36.4 °C)  Pulse:  [100-112] 102  Resp:  [16-20] 18  SpO2:  [100 %] 100 %  BP: (109-134)/(65-72) 117/66     Weight: (!) 149.7 kg (330 lb)  Body mass index is 51.69 kg/m².    Physical Exam   Constitutional: He is oriented to person, place, and time. He appears well-developed and well-nourished. No distress.   HENT:   Head: Normocephalic and atraumatic.   Eyes: EOM are normal.   Neck: Normal range of motion. Neck supple.   Abdominal: Soft. Bowel sounds are normal. He exhibits distension. There is tenderness. There is no guarding.   Significantly enlarged, distended abdomen  No guarding, no rebound   Musculoskeletal:   Reduced range of motion due to morbid obesity   Neurological: He is alert and oriented to person, place, and time.   Skin: Skin is warm and dry. He is not diaphoretic.   2+ bilateral pitting edema to mid thigh  Minimal fluid weeping bilateral lower extermities   Psychiatric: He has a normal mood and affect. His behavior is normal.         CRANIAL NERVES     CN III, IV, VI   Extraocular motions are normal.        Significant Labs:   A1C: No results for input(s): HGBA1C in the last 4320 hours.  Bilirubin:   Recent Labs   Lab 01/28/19  0847   BILITOT 0.5     CBC:   Recent Labs   Lab 01/28/19  0847 01/28/19  0855   WBC 11.62  --    HGB 11.1*  --    HCT 35.0* 32*   *  --       CMP:   Recent Labs   Lab 01/28/19  0847      K 4.8      CO2 22*   GLU 89   BUN 18   CREATININE 0.9   CALCIUM 7.9*   PROT 6.1   ALBUMIN 1.6*   BILITOT 0.5   ALKPHOS 129   AST 24   ALT 15   ANIONGAP 6*   EGFRNONAA >60.0     Cardiac Markers:   Recent Labs   Lab 01/28/19  0940   BNP 89     Coagulation:   Recent Labs   Lab 01/28/19  0940   INR 1.1     Lactic Acid: No results for input(s): LACTATE in the last 48 hours.  Lipase:   Recent Labs   Lab 01/28/19  0847   LIPASE 4       Significant Imaging:     CXR 1/28/19  - No acute findings, no pulmonary congestion      CT abdo/pelvis 1/28/19  - Liver cirrhotic morphology - interval increase in ascites   - Hypodense air-fluid collection along the body of the pancreas - may relate to pancreatic necrosis with pancreatic pseudocysts with infected pseudocyst not excluded  - Diffuse anasarca  - Sleeve gastrectomy.  - Extensive atherosclerosis      Assessment/Plan:     * Chronic pancreatitis    PSEUDOCYST OF PANCREAS    - Initial acute pancreatitis episode September 2018, 5 day inpatient hospital stay  - Scheduled for re-imaging day of admission, however pt came to [facility] ED as he felt his current state (anasarca, abdominal ascites, LE edema) prevented him form making that appointment      Plan:  - Pt not presenting as toxic, no leukocytosis, no hypotension, no red flag exam findings       CT abdo/pelvis 1/28/19  - Liver cirrhotic morphology - interval increase in ascites   - Hypodense air-fluid collection along the body of the pancreas - may relate to pancreatic necrosis with pancreatic pseudocysts with infected pseudocyst not excluded  - Diffuse anasarca  - Sleeve gastrectomy.  - Extensive atherosclerosis     Cirrhosis of liver with ascites    ASCITES  ANASARCA    - Pt severely fluid overloaded  - Cirrhotic liver appearance on admission CT abdo/pelvis  - Questionable etiology, possible alcohol involvement but unclear at this time      Plan:  - Non-alcoholic  cirrhosis workup -- Hep panel, anti-smooth muscle  - Diagnostic and therapeutic paracentesis - reportedly never had a tap before thus will take off maximum 3-5 liters, SBP labs         Hypertension        - Continuing home lopressor 50 BID  - continue ramipril 2.5mg  - Observing       Diabetic polyneuropathy associated with type 2 diabetes mellitus      Plan:  - Detemir 8U QHS  - Moderate SSI       Sleep apnea      Continue home CPAP       Coronary artery disease due to calcified coronary lesion    - Hx 5 stents  - Hx CABG    - Continue ASA 81 (pt reportedly taking daily 325)  - Continue home plavix 75  - Continue lipitor 40mg daily (was on 80 daily, reducing due to chronic liver dysfunction)  - Continue hoome lopressor 50mg BID  - Continue home ramipril 2.5mg     HLD (hyperlipidemia)    Continue lipitor, but at reduced dose of 40mg rather than home 80mg due to chronic liver disease/ cirrhosis       Diverticulosis           Anasarca           Other ascites           Diabetic polyneuropathy           Type 2 diabetes mellitus, with long-term current use of insulin           Morbid obesity with BMI of 50.0-59.9, adult             VTE Risk Mitigation (From admission, onward)        Ordered     enoxaparin injection 40 mg  Every 12 hours      01/28/19 1430     IP VTE HIGH RISK PATIENT  Once      01/28/19 1314             Nick Oreilly MD  Department of Hospital Medicine   [facility]

## 2019-01-28 NOTE — H&P
[facility] Hospital Medicine  History & Physical    Patient Name: Jian Arrieta  MRN: [id number]  Admission Date: 1/28/2019  Attending Physician: Severiano Gonsalez MD   Primary Care Provider: Samir Mahmood MD    [facility] Medicine Team: [facility] Nick Oreilly MD     Patient information was obtained from patient, past medical records and ER records.     Subjective:     Principal Problem:Chronic pancreatitis    Chief Complaint:   Chief Complaint   Patient presents with    Edema     lymph edema to lower ext        HPI: Patient is a 63-year-old male with medical history of CAD (5 stents on ASA plavix), HLD (on statin), Charcot foot, DMII (on insulin), BERHANE (on apap 10-20 QHS), sleeve gastrectomy (12/16), perianal cyst, right knee degenerative changes (hyaluronic acid injections via ortho X3), questionable hx of lymphedema.     Presenting to the ED for left-sided upper abdominal pain, abdominal distention, bilateral lower extremity edema.  Mr CAMPBELL had a bad episode of pancreatitis in September 2018 requiring inpatient stay for 5 days.  His symptoms included abdominal pain in the midepigastric region that radiated to his back and loose stools, CT scan showed severe inflammation, the etiology was thought to be alcohol.   He has occasional diarrhea and loose stool.  He is taking Creon for that, but is not following a low fat diet.  His blood sugar was noted to be uncontrolled during that admission.    Pt denies fever, no chills, no chest pain, no SOB, no purulent draining or erythema of legs.           No new subjective & objective note has been filed under this hospital service since the last note was generated.    Assessment/Plan:     * Chronic pancreatitis    PSEUDOCYST OF PANCREAS    - Initial acute pancreatitis episode September 2018, 5 day inpatient hospital stay  - Scheduled for re-imaging day of admission, however pt came to [facility] ED as he felt his current state (anasarca, abdominal ascites, LE  edema) prevented him form making that appointment      Plan:  - Pt not presenting as toxic, no leukocytosis, no hypotension, no red flag exam findings       CT abdo/pelvis 1/28/19  - Liver cirrhotic morphology - interval increase in ascites   - Hypodense air-fluid collection along the body of the pancreas - may relate to pancreatic necrosis with pancreatic pseudocysts with infected pseudocyst not excluded  - Diffuse anasarca  - Sleeve gastrectomy.  - Extensive atherosclerosis     Cirrhosis of liver with ascites    ASCITES  ANASARCA    - Pt severely fluid overloaded  - Cirrhotic liver appearance on admission CT abdo/pelvis  - Questionable etiology, possible alcohol involvement but unclear at this time      Plan:  - Non-alcoholic cirrhosis workup -- Hep panel, anti-smooth muscle  - Diagnostic and therapeutic paracentesis - reportedly never had a tap before thus will take off maximum 3-5 liters, SBP labs         Hypertension        - Continuing home lopressor 50 BID  - Observing       Sleep apnea      Continue home CPAP       Coronary artery disease due to calcified coronary lesion    - Hx 5 stents    - Continue ASA 81 (pt reportedly taking daily 325)  - Continue home plavix 75  - Continue lipitor 40mg daily (was on 80 daily, reducing due to chronic liver dysfunction)  - Continue hoome lopressor 50mg BID     HLD (hyperlipidemia)    Continue lipitor, but at reduced dose of 40mg rather than home 80mg due to chronic liver disease/ cirrhosis         VTE Risk Mitigation (From admission, onward)        Ordered     enoxaparin injection 40 mg  Every 12 hours      01/28/19 1430     IP VTE HIGH RISK PATIENT  Once      01/28/19 1314             Nick Oreilly MD  Department of Hospital Medicine   [facility]

## 2019-01-28 NOTE — ED TRIAGE NOTES
Comes to the ED with c/o abdominal pain that started  3 days ago.  Was scheduled for  A CT today but states he wasn't in good enough shape to go the test.

## 2019-01-28 NOTE — PROGRESS NOTES
Two patient identifiers verified. No prior blood transfusion reaction noted. # 20 g IV in place to LH. 3ml of Optison administered for 2D imaging, patient tolerated well. Imaging completed. IV flushed.

## 2019-01-29 VITALS
DIASTOLIC BLOOD PRESSURE: 62 MMHG | HEART RATE: 114 BPM | SYSTOLIC BLOOD PRESSURE: 116 MMHG | OXYGEN SATURATION: 98 % | RESPIRATION RATE: 18 BRPM | TEMPERATURE: 99 F

## 2019-01-29 PROBLEM — R71.0 HEMOGLOBIN DROP: Status: ACTIVE | Noted: 2019-01-29

## 2019-01-29 PROBLEM — I87.2 STASIS DERMATITIS OF BOTH LEGS: Status: ACTIVE | Noted: 2019-01-29

## 2019-01-29 PROBLEM — E63.9 NUTRITION DISORDER: Chronic | Status: ACTIVE | Noted: 2019-01-29

## 2019-01-29 PROBLEM — I89.0 LYMPHEDEMA OF BOTH LOWER EXTREMITIES: Status: ACTIVE | Noted: 2019-01-29

## 2019-01-29 PROBLEM — I50.43 ACUTE ON CHRONIC COMBINED SYSTOLIC AND DIASTOLIC HEART FAILURE: Status: ACTIVE | Noted: 2019-01-29

## 2019-01-29 PROBLEM — I50.42 CHRONIC COMBINED SYSTOLIC AND DIASTOLIC HEART FAILURE: Status: ACTIVE | Noted: 2019-01-29

## 2019-01-29 LAB
ALBUMIN FLD-MCNC: 0.7 G/DL
ALBUMIN SERPL BCP-MCNC: 1.5 G/DL
ALP SERPL-CCNC: 101 U/L
ALT SERPL W/O P-5'-P-CCNC: 11 U/L
AMYLASE, BODY FLUID: 17 U/L
ANION GAP SERPL CALC-SCNC: 5 MMOL/L
APPEARANCE FLD: CLEAR
AST SERPL-CCNC: 14 U/L
BASOPHILS # BLD AUTO: 0.02 K/UL
BASOPHILS # BLD AUTO: 0.03 K/UL
BASOPHILS NFR BLD: 0.3 %
BASOPHILS NFR BLD: 0.5 %
BILIRUB SERPL-MCNC: 0.4 MG/DL
BODY FLD TYPE: NORMAL
BODY FLUID SOURCE AMYLASE: NORMAL
BODY FLUID SOURCE, LDH: NORMAL
BUN SERPL-MCNC: 19 MG/DL
CALCIUM SERPL-MCNC: 7.5 MG/DL
CHLORIDE SERPL-SCNC: 106 MMOL/L
CO2 SERPL-SCNC: 24 MMOL/L
COLOR FLD: YELLOW
CREAT SERPL-MCNC: 0.9 MG/DL
DIFFERENTIAL METHOD: ABNORMAL
DIFFERENTIAL METHOD: ABNORMAL
EOSINOPHIL # BLD AUTO: 0 K/UL
EOSINOPHIL # BLD AUTO: 0.1 K/UL
EOSINOPHIL NFR BLD: 0.5 %
EOSINOPHIL NFR BLD: 0.8 %
ERYTHROCYTE [DISTWIDTH] IN BLOOD BY AUTOMATED COUNT: 14.6 %
ERYTHROCYTE [DISTWIDTH] IN BLOOD BY AUTOMATED COUNT: 14.6 %
EST. GFR  (AFRICAN AMERICAN): >60 ML/MIN/1.73 M^2
EST. GFR  (NON AFRICAN AMERICAN): >60 ML/MIN/1.73 M^2
FERRITIN SERPL-MCNC: 285 NG/ML
GLUCOSE SERPL-MCNC: 196 MG/DL
GRAM STN SPEC: NORMAL
HAV IGM SERPL QL IA: NEGATIVE
HBV CORE IGM SERPL QL IA: NEGATIVE
HBV SURFACE AG SERPL QL IA: NEGATIVE
HCT VFR BLD AUTO: 27.1 %
HCT VFR BLD AUTO: 28 %
HCV AB SERPL QL IA: NEGATIVE
HCV AB SERPL QL IA: NEGATIVE
HGB BLD-MCNC: 8.5 G/DL
HGB BLD-MCNC: 9 G/DL
HIV 1+2 AB+HIV1 P24 AG SERPL QL IA: NEGATIVE
IMM GRANULOCYTES # BLD AUTO: 0.02 K/UL
IMM GRANULOCYTES # BLD AUTO: 0.03 K/UL
IMM GRANULOCYTES NFR BLD AUTO: 0.4 %
IMM GRANULOCYTES NFR BLD AUTO: 0.5 %
IRON SERPL-MCNC: 24 UG/DL
LDH FLD L TO P-CCNC: 69 U/L
LYMPHOCYTES # BLD AUTO: 1.1 K/UL
LYMPHOCYTES # BLD AUTO: 1.2 K/UL
LYMPHOCYTES NFR BLD: 19.5 %
LYMPHOCYTES NFR BLD: 20 %
LYMPHOCYTES NFR FLD MANUAL: 45 %
MAGNESIUM SERPL-MCNC: 1.6 MG/DL
MCH RBC QN AUTO: 28.7 PG
MCH RBC QN AUTO: 29.2 PG
MCHC RBC AUTO-ENTMCNC: 31.4 G/DL
MCHC RBC AUTO-ENTMCNC: 32.1 G/DL
MCV RBC AUTO: 91 FL
MCV RBC AUTO: 92 FL
MESOTHL CELL NFR FLD MANUAL: 3 %
MONOCYTES # BLD AUTO: 0.6 K/UL
MONOCYTES # BLD AUTO: 0.7 K/UL
MONOCYTES NFR BLD: 11.2 %
MONOCYTES NFR BLD: 12.2 %
MONOS+MACROS NFR FLD MANUAL: 8 %
NEUTROPHILS # BLD AUTO: 3.9 K/UL
NEUTROPHILS # BLD AUTO: 3.9 K/UL
NEUTROPHILS NFR BLD: 66.7 %
NEUTROPHILS NFR BLD: 67.4 %
NEUTROPHILS NFR FLD MANUAL: 44 %
NRBC BLD-RTO: 0 /100 WBC
NRBC BLD-RTO: 0 /100 WBC
PHOSPHATE SERPL-MCNC: 3.4 MG/DL
PLATELET # BLD AUTO: 239 K/UL
PLATELET # BLD AUTO: 247 K/UL
PMV BLD AUTO: 9.1 FL
PMV BLD AUTO: 9.4 FL
POCT GLUCOSE: 177 MG/DL (ref 70–110)
POCT GLUCOSE: 195 MG/DL (ref 70–110)
POCT GLUCOSE: 210 MG/DL (ref 70–110)
POCT GLUCOSE: 210 MG/DL (ref 70–110)
POCT GLUCOSE: 245 MG/DL (ref 70–110)
POCT GLUCOSE: 71 MG/DL (ref 70–110)
POTASSIUM SERPL-SCNC: 4.4 MMOL/L
PROT FLD-MCNC: 1.9 G/DL
PROT SERPL-MCNC: 4.8 G/DL
RBC # BLD AUTO: 2.96 M/UL
RBC # BLD AUTO: 3.08 M/UL
SATURATED IRON: 44 %
SODIUM SERPL-SCNC: 135 MMOL/L
SPECIMEN SOURCE: NORMAL
SPECIMEN SOURCE: NORMAL
TOTAL IRON BINDING CAPACITY: 55 UG/DL
TRANSFERRIN SERPL-MCNC: 37 MG/DL
WBC # BLD AUTO: 5.71 K/UL
WBC # BLD AUTO: 5.91 K/UL
WBC # FLD: 309 /CU MM

## 2019-01-29 PROCEDURE — 12000002 HC ACUTE/MED SURGE SEMI-PRIVATE ROOM

## 2019-01-29 PROCEDURE — 82728 ASSAY OF FERRITIN: CPT

## 2019-01-29 PROCEDURE — 84100 ASSAY OF PHOSPHORUS: CPT

## 2019-01-29 PROCEDURE — P9047 ALBUMIN (HUMAN), 25%, 50ML: HCPCS | Mod: JG | Performed by: STUDENT IN AN ORGANIZED HEALTH CARE EDUCATION/TRAINING PROGRAM

## 2019-01-29 PROCEDURE — 83690 ASSAY OF LIPASE: CPT

## 2019-01-29 PROCEDURE — 83615 LACTATE (LD) (LDH) ENZYME: CPT

## 2019-01-29 PROCEDURE — 82042 OTHER SOURCE ALBUMIN QUAN EA: CPT

## 2019-01-29 PROCEDURE — 82150 ASSAY OF AMYLASE: CPT

## 2019-01-29 PROCEDURE — 25000003 PHARM REV CODE 250: Performed by: STUDENT IN AN ORGANIZED HEALTH CARE EDUCATION/TRAINING PROGRAM

## 2019-01-29 PROCEDURE — 63600175 PHARM REV CODE 636 W HCPCS: Mod: JG | Performed by: STUDENT IN AN ORGANIZED HEALTH CARE EDUCATION/TRAINING PROGRAM

## 2019-01-29 PROCEDURE — 84157 ASSAY OF PROTEIN OTHER: CPT

## 2019-01-29 PROCEDURE — 36415 COLL VENOUS BLD VENIPUNCTURE: CPT

## 2019-01-29 PROCEDURE — 87205 SMEAR GRAM STAIN: CPT

## 2019-01-29 PROCEDURE — 83540 ASSAY OF IRON: CPT

## 2019-01-29 PROCEDURE — 99900035 HC TECH TIME PER 15 MIN (STAT)

## 2019-01-29 PROCEDURE — 80053 COMPREHEN METABOLIC PANEL: CPT

## 2019-01-29 PROCEDURE — S5571 INSULIN DISPOS PEN 3 ML: HCPCS | Performed by: STUDENT IN AN ORGANIZED HEALTH CARE EDUCATION/TRAINING PROGRAM

## 2019-01-29 PROCEDURE — 83735 ASSAY OF MAGNESIUM: CPT

## 2019-01-29 PROCEDURE — 85025 COMPLETE CBC W/AUTO DIFF WBC: CPT | Mod: 91

## 2019-01-29 PROCEDURE — 87070 CULTURE OTHR SPECIMN AEROBIC: CPT

## 2019-01-29 PROCEDURE — 89051 BODY FLUID CELL COUNT: CPT

## 2019-01-29 PROCEDURE — 87075 CULTR BACTERIA EXCEPT BLOOD: CPT

## 2019-01-29 PROCEDURE — 99232 SBSQ HOSP IP/OBS MODERATE 35: CPT | Mod: ,,, | Performed by: HOSPITALIST

## 2019-01-29 PROCEDURE — 99232 PR SUBSEQUENT HOSPITAL CARE,LEVL II: ICD-10-PCS | Mod: ,,, | Performed by: HOSPITALIST

## 2019-01-29 RX ORDER — CHOLECALCIFEROL (VITAMIN D3) 25 MCG
1000 TABLET ORAL DAILY
Status: DISCONTINUED | OUTPATIENT
Start: 2019-01-29 | End: 2019-01-30 | Stop reason: HOSPADM

## 2019-01-29 RX ORDER — SPIRONOLACTONE 25 MG/1
25 TABLET ORAL DAILY
Status: DISCONTINUED | OUTPATIENT
Start: 2019-01-29 | End: 2019-01-29

## 2019-01-29 RX ORDER — ALBUMIN HUMAN 250 G/1000ML
37.5 SOLUTION INTRAVENOUS ONCE
Status: COMPLETED | OUTPATIENT
Start: 2019-01-29 | End: 2019-01-29

## 2019-01-29 RX ORDER — FUROSEMIDE 10 MG/ML
40 INJECTION INTRAMUSCULAR; INTRAVENOUS 2 TIMES DAILY
Status: DISCONTINUED | OUTPATIENT
Start: 2019-01-29 | End: 2019-01-30

## 2019-01-29 RX ADMIN — ALBUMIN (HUMAN) 37.5 G: 25 SOLUTION INTRAVENOUS at 11:01

## 2019-01-29 RX ADMIN — CYANOCOBALAMIN TAB 250 MCG 250 MCG: 250 TAB at 09:01

## 2019-01-29 RX ADMIN — METOPROLOL TARTRATE 50 MG: 50 TABLET ORAL at 08:01

## 2019-01-29 RX ADMIN — ENOXAPARIN SODIUM 40 MG: 100 INJECTION SUBCUTANEOUS at 09:01

## 2019-01-29 RX ADMIN — INSULIN DETEMIR 8 UNITS: 100 INJECTION, SOLUTION SUBCUTANEOUS at 09:01

## 2019-01-29 RX ADMIN — PANCRELIPASE 1 CAPSULE: 60000; 12000; 38000 CAPSULE, DELAYED RELEASE PELLETS ORAL at 12:01

## 2019-01-29 RX ADMIN — METOPROLOL TARTRATE 50 MG: 50 TABLET ORAL at 09:01

## 2019-01-29 RX ADMIN — VITAMIN D, TAB 1000IU (100/BT) 1000 UNITS: 25 TAB at 05:01

## 2019-01-29 RX ADMIN — POLYETHYLENE GLYCOL 3350 1 G: 17 POWDER, FOR SOLUTION ORAL at 09:01

## 2019-01-29 RX ADMIN — FUROSEMIDE 40 MG: 10 INJECTION, SOLUTION INTRAVENOUS at 05:01

## 2019-01-29 RX ADMIN — ATORVASTATIN CALCIUM 40 MG: 20 TABLET, FILM COATED ORAL at 09:01

## 2019-01-29 RX ADMIN — PANCRELIPASE 1 CAPSULE: 60000; 12000; 38000 CAPSULE, DELAYED RELEASE PELLETS ORAL at 09:01

## 2019-01-29 RX ADMIN — PANCRELIPASE 1 CAPSULE: 60000; 12000; 38000 CAPSULE, DELAYED RELEASE PELLETS ORAL at 05:01

## 2019-01-29 RX ADMIN — ASPIRIN 81 MG CHEWABLE TABLET 81 MG: 81 TABLET CHEWABLE at 09:01

## 2019-01-29 NOTE — ASSESSMENT & PLAN NOTE
Hb drop for 11.1 to 8.5    Plan:  - Monitor with repeat CBCs  - Holding lovenox   - Pt reports not on plavix  - Continuing ASA81

## 2019-01-29 NOTE — PROCEDURES
Radiology Post-Procedure Note    Pre Op Diagnosis: Ascites  Post Op Diagnosis: Same    Procedure: Ultrasound Guided Paracentesis    Procedure performed by: Cele GARCIA, Reyna     Written Informed Consent Obtained: Yes  Specimen Removed: YES cloudy yellow  Estimated Blood Loss: Minimal    Findings:   Successful paracentesis.  Albumin administered PRN per protocol.    Patient tolerated procedure well.    Reyna Oakley, APRN, FNP  Interventional Radiology  [phone] [employer]

## 2019-01-29 NOTE — SUBJECTIVE & OBJECTIVE
Past Medical History:   Diagnosis Date    Anasarca 2019    Atherosclerosis     Cancer     skin cancer    Charcot foot due to diabetes mellitus     Charcot foot due to diabetes mellitus     Chest pain     Chronic pancreatitis 2019    Cirrhosis of liver with ascites 2019    Colon polyps     approx 5 yrs ago    Coronary artery disease     Diabetes mellitus     Diverticulosis 2019    Gastric ulcer     Hydrocele     approx 1.5 yrs ago    Hyperlipidemia     Hypertension 2019    Lymphedema     Perianal cyst     approx 2 yrs ago    Pseudocyst of pancreas 2019       Past Surgical History:   Procedure Laterality Date    ANGIOPLASTY      total x5 stents    CARDIAC SURGERY      COLONOSCOPY N/A 10/6/2015    Performed by Shekhar Richards MD at [facility] ENDO (2ND FLR)    CYST REMOVAL      GASTRECTOMY      GASTRECTOMY-SLEEVE-LAPAROSCOPIC - 11857 N/A 2015    Performed by Micheal Villavicencio Jr., MD at [facility] OR 2ND FLR    KNEE ARTHROSCOPY      perianal surgery      perianal cyst removed       Review of patient's allergies indicates:  No Known Allergies  Family History     Problem Relation (Age of Onset)    Cancer Mother, Father, Paternal Grandfather, Brother    Diabetes Maternal Grandmother    No Known Problems Paternal Grandmother    Obesity Sister    Parkinsonism Brother    Stroke Maternal Grandfather        Tobacco Use    Smoking status: Former Smoker     Packs/day: 2.00     Years: 30.00     Pack years: 60.00     Types: Cigarettes     Last attempt to quit: 2005     Years since quittin.0    Smokeless tobacco: Never Used   Substance and Sexual Activity    Alcohol use: Yes     Alcohol/week: 0.0 - 0.6 oz    Drug use: No    Sexual activity: Yes     Review of Systems   Constitutional: Positive for activity change and fatigue. Negative for chills and fever.   HENT: Negative for facial swelling, mouth sores and trouble swallowing.    Eyes: Negative for pain and redness.    Respiratory: Negative for cough and shortness of breath.    Cardiovascular: Positive for leg swelling. Negative for chest pain.   Gastrointestinal: Positive for abdominal distention and diarrhea. Negative for abdominal pain, blood in stool and vomiting.   Genitourinary: Negative for dysuria and hematuria.   Musculoskeletal: Negative for gait problem and neck stiffness.   Skin: Positive for wound. Negative for rash.   Neurological: Negative for seizures and headaches.   Psychiatric/Behavioral: Negative for confusion and hallucinations.     Objective:     Vital Signs (Most Recent):  Temp: 97.9 °F (36.6 °C) (01/29/19 0924)  Pulse: 104 (01/29/19 0924)  Resp: 18 (01/29/19 0924)  BP: 113/68 (01/29/19 0924)  SpO2: 97 % (01/29/19 0924) Vital Signs (24h Range):  Temp:  [97.3 °F (36.3 °C)-99.1 °F (37.3 °C)] 97.9 °F (36.6 °C)  Pulse:  [] 104  Resp:  [16-20] 18  SpO2:  [97 %-100 %] 97 %  BP: (102-134)/(56-72) 113/68     Weight: (!) 149.7 kg (330 lb) (01/29/19 0400)  Body mass index is 51.79 kg/m².      Intake/Output Summary (Last 24 hours) at 1/29/2019 1020  Last data filed at 1/29/2019 0100  Gross per 24 hour   Intake 630 ml   Output 530 ml   Net 100 ml       Lines/Drains/Airways     Peripheral Intravenous Line                 Peripheral IV - Single Lumen 01/28/19 0847 Left Hand 1 day                Physical Exam   Constitutional: He is oriented to person, place, and time. No distress.   Chronically ill appearing , nontoxic   HENT:   Head: Normocephalic and atraumatic.   Eyes: Conjunctivae are normal. No scleral icterus.   Neck: Neck supple. No thyromegaly present.   Cardiovascular: Normal rate and intact distal pulses.   Pulmonary/Chest: Effort normal. No respiratory distress.   Abdominal: Soft. He exhibits distension. He exhibits no mass. There is no tenderness. There is no guarding.   Musculoskeletal: Normal range of motion. He exhibits edema. He exhibits no tenderness.   Neurological: He is alert and oriented to  person, place, and time. No cranial nerve deficit.   Skin: Skin is warm and dry. No rash noted. There is pallor.   Psychiatric: He has a normal mood and affect. His behavior is normal.   Vitals reviewed.      Significant Labs:  Amylase: No results for input(s): AMYLASE in the last 48 hours.  Blood Culture: No results for input(s): LABBLOO in the last 48 hours.  CBC:   Recent Labs   Lab 01/28/19  0847 01/28/19  0855 01/29/19  0732   WBC 11.62  --  5.71   HGB 11.1*  --  8.5*   HCT 35.0* 32* 27.1*   *  --  239     CMP:   Recent Labs   Lab 01/29/19  0732   *   CALCIUM 7.5*   ALBUMIN 1.5*   PROT 4.8*   *   K 4.4   CO2 24      BUN 19   CREATININE 0.9   ALKPHOS 101   ALT 11   AST 14   BILITOT 0.4     Coagulation:   Recent Labs   Lab 01/28/19  1349   INR 1.1     CRP:   Recent Labs   Lab 01/28/19  1817   CRP 71.5*     ESR: No results for input(s): SEDRATE in the last 48 hours.  H.Pylori Ab IgG: No results for input(s): HPYLORIIGG in the last 48 hours.  Lipase:   Recent Labs   Lab 01/28/19  0847   LIPASE 4       Significant Imaging:  Imaging results within the past 24 hours have been reviewed.

## 2019-01-29 NOTE — ASSESSMENT & PLAN NOTE
-- Pt with hx 5 coronary stents and CABG  -- Pt previously followed by [facility], desire to change all care to [facility]      Plan:  - CHF may be contributory to anasarca and ascites  - Continue home metoprolol 50mg BID  - Continue ramipril 2.5mg  - Will consider starting aldosterone antagonist spironolactone       TTE 1/28/19  · Technically difficult study - BMI > 50  · Moderately decreased left ventricular systolic function. The estimated ejection fraction is 30%  · Normal right ventricular systolic function.  · Grade I (mild) left ventricular diastolic dysfunction consistent with impaired relaxation.

## 2019-01-29 NOTE — ASSESSMENT & PLAN NOTE
- Hx 5 stents  - Hx CABG    - Continue ASA 81 (pt reportedly taking daily 325)  - Continue lipitor 40mg daily (was on 80 daily, reducing due to chronic liver dysfunction)  - Continue hoome lopressor 50mg BID  - Continue home ramipril 2.5mg

## 2019-01-29 NOTE — ASSESSMENT & PLAN NOTE
PSEUDOCYST OF PANCREAS    - Initial acute pancreatitis episode September 2018, 5 day inpatient hospital stay  - Scheduled for re-imaging day of admission, however pt came to [facility] ED as he felt his current state (anasarca, abdominal ascites, LE edema) prevented him form making that appointment        Plan:  - Ceftriaxone for SBP ppx, however low suspicion  - Paracentesis today with SBP labs + ascites lipase and amylase to assess for pancreatic pseudocyst leak  - AES consulted, following      CT abdo/pelvis 1/28/19  - Liver cirrhotic morphology - interval increase in ascites   - Hypodense air-fluid collection along the body of the pancreas - may relate to pancreatic necrosis with pancreatic pseudocysts with infected pseudocyst not excluded  - Diffuse anasarca  - Sleeve gastrectomy.  - Extensive atherosclerosis

## 2019-01-29 NOTE — CONSULTS
Radiology Consult    Jian Arrieta is a 63 y.o. male with a history of cirrhosis and ascites.  Past Medical History:   Diagnosis Date    Anasarca 1/28/2019    Atherosclerosis     Cancer     skin cancer    Charcot foot due to diabetes mellitus     Charcot foot due to diabetes mellitus     Chest pain     Chronic pancreatitis 1/28/2019    Cirrhosis of liver with ascites 1/28/2019    Colon polyps     approx 5 yrs ago    Coronary artery disease     Diabetes mellitus     Diverticulosis 1/28/2019    Gastric ulcer     Hydrocele     approx 1.5 yrs ago    Hyperlipidemia     Hypertension 1/28/2019    Lymphedema     Perianal cyst     approx 2 yrs ago    Pseudocyst of pancreas 1/28/2019     Past Surgical History:   Procedure Laterality Date    ANGIOPLASTY      total x5 stents    CARDIAC SURGERY      COLONOSCOPY N/A 10/6/2015    Performed by Shekhar Richards MD at [facility] ENDO (2ND FLR)    CYST REMOVAL      GASTRECTOMY      GASTRECTOMY-SLEEVE-LAPAROSCOPIC - 95372 N/A 12/22/2015    Performed by Micheal Villavicencio Jr., MD at [facility] OR 2ND FLR    KNEE ARTHROSCOPY      perianal surgery      perianal cyst removed       Procedure: paracentesis    Scheduled Meds:    aspirin  81 mg Oral Daily    atorvastatin  40 mg Oral Daily    cefTRIAXone (ROCEPHIN) IVPB  2 g Intravenous Q24H    cyanocobalamin  250 mcg Oral Daily    enoxparin  40 mg Subcutaneous Q12H    insulin detemir U-100  8 Units Subcutaneous QHS    lipase-protease-amylase 12,000-38,000-60,000 units  1 capsule Oral TID WM    metoprolol tartrate  50 mg Oral BID    polyethylene glycol  1 g Oral Daily    ramipril  2.5 mg Oral Daily     Continuous Infusions:   PRN Meds:dextrose 50%, glucagon (human recombinant), glucose, glucose, insulin aspart U-100, sodium chloride 0.9%, trazodone    Allergies: Review of patient's allergies indicates:  No Known Allergies    Labs:  Recent Labs   Lab 01/28/19  1349   INR 1.1       Recent Labs   Lab 01/28/19  0810  "01/28/19  0855   WBC 11.62  --    HGB 11.1*  --    HCT 35.0* 32*   MCV 93  --    *  --       Recent Labs   Lab 01/28/19  0847   GLU 89      K 4.8      CO2 22*   BUN 18   CREATININE 0.9   CALCIUM 7.9*   ALT 15   AST 24   ALBUMIN 1.6*   BILITOT 0.5         Vitals (Most Recent):  Temp: 98.7 °F (37.1 °C) (01/29/19 0500)  Pulse: 107 (01/29/19 0500)  Resp: 20 (01/29/19 0500)  BP: 114/72 (01/29/19 0500)  SpO2: 98 % (01/29/19 0011)    Plan  Plan on paracentesis today    Rustam Mohamud MD ([place])  Radiology PGY-5  [phone]      "

## 2019-01-29 NOTE — SUBJECTIVE & OBJECTIVE
Review of Systems   Constitutional: Positive for activity change and fatigue. Negative for chills and fever.   HENT: Negative for facial swelling, mouth sores and trouble swallowing.    Eyes: Negative for pain and redness.   Respiratory: Negative for cough and shortness of breath.    Cardiovascular: Positive for leg swelling. Negative for chest pain.   Gastrointestinal: Positive for abdominal distention and diarrhea. Negative for abdominal pain, blood in stool and vomiting.   Genitourinary: Negative for dysuria and hematuria.   Musculoskeletal: Negative for gait problem and neck stiffness.   Skin: Positive for wound. Negative for rash.   Neurological: Negative for seizures and headaches.   Psychiatric/Behavioral: Negative for confusion and hallucinations.     Objective:     Vital Signs (Most Recent):  Temp: 97.6 °F (36.4 °C) (01/29/19 1045)  Pulse: 82 (01/29/19 1045)  Resp: 18 (01/29/19 1045)  BP: (!) 116/59 (01/29/19 1045)  SpO2: 100 % (01/29/19 1045) Vital Signs (24h Range):  Temp:  [97.3 °F (36.3 °C)-99.1 °F (37.3 °C)] 97.6 °F (36.4 °C)  Pulse:  [] 82  Resp:  [18-20] 18  SpO2:  [97 %-100 %] 100 %  BP: (102-120)/(56-72) 116/59     Weight: (!) 149.7 kg (330 lb)  Body mass index is 51.79 kg/m².    Intake/Output Summary (Last 24 hours) at 1/29/2019 1124  Last data filed at 1/29/2019 0100  Gross per 24 hour   Intake 630 ml   Output 530 ml   Net 100 ml      Physical Exam   Constitutional: He is oriented to person, place, and time. He appears well-developed and well-nourished. No distress.   HENT:   Head: Normocephalic and atraumatic.   Eyes: EOM are normal.   Neck: Normal range of motion. Neck supple.   Abdominal: Soft. Bowel sounds are normal. He exhibits distension. There is tenderness. There is no guarding.   Significantly enlarged, distended abdomen  No guarding, no rebound   Musculoskeletal: He exhibits edema.   Reduced range of motion due to morbid obesity   Neurological: He is alert and oriented to  person, place, and time.   Skin: Skin is warm and dry. He is not diaphoretic.   2+ bilateral pitting edema to mid thigh  Minimal fluid weeping bilateral lower extermities   Psychiatric: He has a normal mood and affect. His behavior is normal.       Significant Labs:   A1C:   Recent Labs   Lab 01/28/19  1349   HGBA1C 6.9*     Bilirubin:   Recent Labs   Lab 01/28/19  0847 01/29/19  0732   BILITOT 0.5 0.4     CBC:   Recent Labs   Lab 01/28/19  0847 01/28/19  0855 01/29/19  0732   WBC 11.62  --  5.71   HGB 11.1*  --  8.5*   HCT 35.0* 32* 27.1*   *  --  239     CMP:   Recent Labs   Lab 01/28/19  0847 01/29/19  0732    135*   K 4.8 4.4    106   CO2 22* 24   GLU 89 196*   BUN 18 19   CREATININE 0.9 0.9   CALCIUM 7.9* 7.5*   PROT 6.1 4.8*   ALBUMIN 1.6* 1.5*   BILITOT 0.5 0.4   ALKPHOS 129 101   AST 24 14   ALT 15 11   ANIONGAP 6* 5*   EGFRNONAA >60.0 >60.0     Coagulation:   Recent Labs   Lab 01/28/19  1349   INR 1.1     Lipase:   Recent Labs   Lab 01/28/19  0847   LIPASE 4     Lipid Panel:   Recent Labs   Lab 01/28/19  1817   CHOL 85*   HDL 31*   LDLCALC 42.8*   TRIG 56   CHOLHDL 36.5     Magnesium:   Recent Labs   Lab 01/29/19  0732   MG 1.6     POCT Glucose:   Recent Labs   Lab 01/28/19  2033 01/29/19  0600   POCTGLUCOSE 210* 195*       Significant Imaging:   Imaging Results          CT Abdomen Pelvis With Contrast (Final result)     Abnormal  Result time 01/28/19 10:51:09    Final result by Isrrael Newman MD (01/28/19 10:51:09)                 Impression:      Liver has a cirrhotic morphology with no focal lesions.  Significant interval increase in ascites when compared to prior exam which may account for patient's abdominal distension.    Hypodense air-fluid collection along the body of the pancreas which is slightly smaller when compared to prior CT.  Findings may relate to pancreatic necrosis with pancreatic pseudocysts with infected pseudocyst not excluded.  Mass effect with narrowing of the  portal confluence in the region although the vessels appear patent.    Diffuse anasarca.    Sleeve gastrectomy.    Extensive atherosclerosis.    This report was flagged in Epic as abnormal.      Electronically signed by: Isrrael Newman MD  Date:    01/28/2019  Time:    10:51             Narrative:    EXAMINATION:  CT ABDOMEN PELVIS WITH CONTRAST    CLINICAL HISTORY:  Abdominal distension;    TECHNIQUE:  Low dose axial images, sagittal and coronal reformations were obtained from the lung bases to the pubic symphysis following the IV administration of 100 mL of Omnipaque 350 .  Oral contrast was not given.    COMPARISON:  Outside examination of 11/16/2018    FINDINGS:  Heart is not enlarged.  There is extensive coronary atherosclerosis.  Visualized portions of the lung bases demonstrate no acute abnormalities.    There is a cirrhotic morphology to the liver with a nodular contour.  No focal liver lesions are noted on single phase of imaging.  No significant intrahepatic biliary ductal dilatation.  Gallbladder is mildly distended but shows no evidence of radiopaque Stones or wall thickening.  There are postoperative changes of sleeve gastrectomy.  There is a 9.3 x 3.9 x 3.4 cm hypodense air-fluid collection centered around the body of the pancreas.  The portal vein is narrowed near the confluence of the splenic vein and SMV although these vessels appear patent.    Spleen is upper normal in size.    Small and large bowel show no evidence of obstruction.  There is no free air.  Appendix not definitely visualized.  Tubular air-filled structure in the right lower abdomen is possibly the appendix and appears within normal limits.  There is diverticulosis.    Kidneys have a lobular contour with vascular calcifications.  No evidence of hydronephrosis.  Kidneys concentrate excrete contrast on delayed images.  Urinary bladder is nondistended but grossly unremarkable.    Significant interval increase in ascites when compared to  prior exam.  Ascites measures slightly higher in attenuation than simple fluid although this may be in part due to artifact from patient's thick body wall and anasarca.    Aorta is normal in caliber with severe atherosclerosis.  There is diffuse anasarca.  Bones demonstrate no acute abnormalities.                               X-Ray Chest PA And Lateral (Final result)  Result time 01/28/19 09:34:06    Final result by Ab Smith III, MD (01/28/19 09:34:06)                 Impression:      See above    No acute process seen.      Electronically signed by: Ab Smith MD  Date:    01/28/2019  Time:    09:34             Narrative:    EXAMINATION:  XR CHEST PA AND LATERAL    CLINICAL HISTORY:  Shortness of breath    FINDINGS:  There is postoperative change.  Heart size is normal.  Lungs are clear.

## 2019-01-29 NOTE — PROGRESS NOTES
[facility] Hospital Medicine  Progress Note    Patient Name: Jian Arrieta  MRN: [id number]  Patient Class: IP- Inpatient   Admission Date: 1/28/2019  Length of Stay: 1 days  Attending Physician: Clemente Feliz MD  Primary Care Provider: Samir Mahmood MD    [facility] Medicine Team: [facility]OSP MED 3 Nick Oreilly MD    Subjective:     Principal Problem:Chronic pancreatitis    HPI:  Patient is a 63-year-old male with medical history of CAD (5 stents on ASA plavix), HLD (on statin), Charcot foot, DMII (on insulin), BERHANE (on apap 10-20 QHS), sleeve gastrectomy (12/16), perianal cyst, right knee degenerative changes (hyaluronic acid injections via ortho X3), questionable hx of lymphedema.     Presenting to the ED for left-sided upper abdominal pain, abdominal distention, bilateral lower extremity edema.  Mr CAMPBELL had a bad episode of pancreatitis in September 2018 requiring inpatient stay for 5 days.  His symptoms included abdominal pain in the midepigastric region that radiated to his back and loose stools, CT scan showed severe inflammation, the etiology was thought to be alcohol.   He has occasional diarrhea and loose stool.  He is taking Creon for that, but is not following a low fat diet.  His blood sugar was noted to be uncontrolled during that admission.    Pt denies fever, no chills, no chest pain, no SOB, no purulent draining or erythema of legs.           Hospital Course:  In ED, patient given lasix 20 IV which produced good UOP.  CXR was unconcerning, specifically there was no evidence of pulmonary edema.  CT abdo revealed cirrhosis, anasarca and increased volume ascites.  TTE reveal HFrEF EF 30% with diastolic dysfunction.  Paracentesis to be performed by IR, unable to identify pocket on floor.  Added lipase and amylase to para labs to search for possible extravasation of pancreatic pseudocyst fluids into ascites.  Hepatitis panel negative and wife confirmed longstanding alcohol intake, likely  contributing to cirrhosis.  Diuresing ongoing...      Hb drop from admit 11.1 to 8.5, held lovenox and trending CBCs.      Review of Systems   Constitutional: Positive for activity change and fatigue. Negative for chills and fever.   HENT: Negative for facial swelling, mouth sores and trouble swallowing.    Eyes: Negative for pain and redness.   Respiratory: Negative for cough and shortness of breath.    Cardiovascular: Positive for leg swelling. Negative for chest pain.   Gastrointestinal: Positive for abdominal distention and diarrhea. Negative for abdominal pain, blood in stool and vomiting.   Genitourinary: Negative for dysuria and hematuria.   Musculoskeletal: Negative for gait problem and neck stiffness.   Skin: Positive for wound. Negative for rash.   Neurological: Negative for seizures and headaches.   Psychiatric/Behavioral: Negative for confusion and hallucinations.     Objective:     Vital Signs (Most Recent):  Temp: 97.6 °F (36.4 °C) (01/29/19 1045)  Pulse: 82 (01/29/19 1045)  Resp: 18 (01/29/19 1045)  BP: (!) 116/59 (01/29/19 1045)  SpO2: 100 % (01/29/19 1045) Vital Signs (24h Range):  Temp:  [97.3 °F (36.3 °C)-99.1 °F (37.3 °C)] 97.6 °F (36.4 °C)  Pulse:  [] 82  Resp:  [18-20] 18  SpO2:  [97 %-100 %] 100 %  BP: (102-120)/(56-72) 116/59     Weight: (!) 149.7 kg (330 lb)  Body mass index is 51.79 kg/m².    Intake/Output Summary (Last 24 hours) at 1/29/2019 1124  Last data filed at 1/29/2019 0100  Gross per 24 hour   Intake 630 ml   Output 530 ml   Net 100 ml      Physical Exam   Constitutional: He is oriented to person, place, and time. He appears well-developed and well-nourished. No distress.   HENT:   Head: Normocephalic and atraumatic.   Eyes: EOM are normal.   Neck: Normal range of motion. Neck supple.   Abdominal: Soft. Bowel sounds are normal. He exhibits distension. There is tenderness. There is no guarding.   Significantly enlarged, distended abdomen  No guarding, no rebound    Musculoskeletal: He exhibits edema.   Reduced range of motion due to morbid obesity   Neurological: He is alert and oriented to person, place, and time.   Skin: Skin is warm and dry. He is not diaphoretic.   2+ bilateral pitting edema to mid thigh  Minimal fluid weeping bilateral lower extermities   Psychiatric: He has a normal mood and affect. His behavior is normal.       Significant Labs:   A1C:   Recent Labs   Lab 01/28/19  1349   HGBA1C 6.9*     Bilirubin:   Recent Labs   Lab 01/28/19  0847 01/29/19  0732   BILITOT 0.5 0.4     CBC:   Recent Labs   Lab 01/28/19  0847 01/28/19  0855 01/29/19  0732   WBC 11.62  --  5.71   HGB 11.1*  --  8.5*   HCT 35.0* 32* 27.1*   *  --  239     CMP:   Recent Labs   Lab 01/28/19  0847 01/29/19  0732    135*   K 4.8 4.4    106   CO2 22* 24   GLU 89 196*   BUN 18 19   CREATININE 0.9 0.9   CALCIUM 7.9* 7.5*   PROT 6.1 4.8*   ALBUMIN 1.6* 1.5*   BILITOT 0.5 0.4   ALKPHOS 129 101   AST 24 14   ALT 15 11   ANIONGAP 6* 5*   EGFRNONAA >60.0 >60.0     Coagulation:   Recent Labs   Lab 01/28/19  1349   INR 1.1     Lipase:   Recent Labs   Lab 01/28/19  0847   LIPASE 4     Lipid Panel:   Recent Labs   Lab 01/28/19  1817   CHOL 85*   HDL 31*   LDLCALC 42.8*   TRIG 56   CHOLHDL 36.5     Magnesium:   Recent Labs   Lab 01/29/19  0732   MG 1.6     POCT Glucose:   Recent Labs   Lab 01/28/19  2033 01/29/19  0600   POCTGLUCOSE 210* 195*       Significant Imaging:   Imaging Results          CT Abdomen Pelvis With Contrast (Final result)     Abnormal  Result time 01/28/19 10:51:09    Final result by Isrrael Newman MD (01/28/19 10:51:09)                 Impression:      Liver has a cirrhotic morphology with no focal lesions.  Significant interval increase in ascites when compared to prior exam which may account for patient's abdominal distension.    Hypodense air-fluid collection along the body of the pancreas which is slightly smaller when compared to prior CT.  Findings may  relate to pancreatic necrosis with pancreatic pseudocysts with infected pseudocyst not excluded.  Mass effect with narrowing of the portal confluence in the region although the vessels appear patent.    Diffuse anasarca.    Sleeve gastrectomy.    Extensive atherosclerosis.    This report was flagged in Epic as abnormal.      Electronically signed by: Isrrael Newman MD  Date:    01/28/2019  Time:    10:51             Narrative:    EXAMINATION:  CT ABDOMEN PELVIS WITH CONTRAST    CLINICAL HISTORY:  Abdominal distension;    TECHNIQUE:  Low dose axial images, sagittal and coronal reformations were obtained from the lung bases to the pubic symphysis following the IV administration of 100 mL of Omnipaque 350 .  Oral contrast was not given.    COMPARISON:  Outside examination of 11/16/2018    FINDINGS:  Heart is not enlarged.  There is extensive coronary atherosclerosis.  Visualized portions of the lung bases demonstrate no acute abnormalities.    There is a cirrhotic morphology to the liver with a nodular contour.  No focal liver lesions are noted on single phase of imaging.  No significant intrahepatic biliary ductal dilatation.  Gallbladder is mildly distended but shows no evidence of radiopaque Stones or wall thickening.  There are postoperative changes of sleeve gastrectomy.  There is a 9.3 x 3.9 x 3.4 cm hypodense air-fluid collection centered around the body of the pancreas.  The portal vein is narrowed near the confluence of the splenic vein and SMV although these vessels appear patent.    Spleen is upper normal in size.    Small and large bowel show no evidence of obstruction.  There is no free air.  Appendix not definitely visualized.  Tubular air-filled structure in the right lower abdomen is possibly the appendix and appears within normal limits.  There is diverticulosis.    Kidneys have a lobular contour with vascular calcifications.  No evidence of hydronephrosis.  Kidneys concentrate excrete contrast on  delayed images.  Urinary bladder is nondistended but grossly unremarkable.    Significant interval increase in ascites when compared to prior exam.  Ascites measures slightly higher in attenuation than simple fluid although this may be in part due to artifact from patient's thick body wall and anasarca.    Aorta is normal in caliber with severe atherosclerosis.  There is diffuse anasarca.  Bones demonstrate no acute abnormalities.                               X-Ray Chest PA And Lateral (Final result)  Result time 01/28/19 09:34:06    Final result by Ab Smith III, MD (01/28/19 09:34:06)                 Impression:      See above    No acute process seen.      Electronically signed by: Ab Smith MD  Date:    01/28/2019  Time:    09:34             Narrative:    EXAMINATION:  XR CHEST PA AND LATERAL    CLINICAL HISTORY:  Shortness of breath    FINDINGS:  There is postoperative change.  Heart size is normal.  Lungs are clear.                              Assessment/Plan:      * Chronic pancreatitis    PSEUDOCYST OF PANCREAS    - Initial acute pancreatitis episode September 2018, 5 day inpatient hospital stay  - Scheduled for re-imaging day of admission, however pt came to [facility] ED as he felt his current state (anasarca, abdominal ascites, LE edema) prevented him form making that appointment        Plan:  - Ceftriaxone for SBP ppx, however low suspicion  - Paracentesis today with SBP labs + ascites lipase and amylase to assess for pancreatic pseudocyst leak  - AES consulted, following      CT abdo/pelvis 1/28/19  - Liver cirrhotic morphology - interval increase in ascites   - Hypodense air-fluid collection along the body of the pancreas - may relate to pancreatic necrosis with pancreatic pseudocysts with infected pseudocyst not excluded  - Diffuse anasarca  - Sleeve gastrectomy.  - Extensive atherosclerosis     Chronic combined systolic and diastolic heart failure    -- Pt with hx 5 coronary stents and  CABG  -- Pt previously followed by [facility], desire to change all care to [facility]      Plan:  - CHF may be contributory to anasarca and ascites  - Continue home metoprolol 50mg BID  - Continue ramipril 2.5mg  - Will consider starting aldosterone antagonist spironolactone       TTE 1/28/19  · Technically difficult study - BMI > 50  · Moderately decreased left ventricular systolic function. The estimated ejection fraction is 30%  · Normal right ventricular systolic function.  · Grade I (mild) left ventricular diastolic dysfunction consistent with impaired relaxation.       Cirrhosis of liver with ascites    ASCITES  ANASARCA  STASIS DERMATITIS OF BOTH LEGS    - Newly diagnosed cirrhosis  - Pt severely fluid overloaded  - Cirrhotic liver appearance on admission CT abdo/pelvis  - Questionable etiology, possible alcohol involvement but unclear at this time      Plan:  - Non-alcoholic cirrhosis workup -- Hep panel, anti-smooth muscle  - Diagnostic and therapeutic paracentesis - reportedly never had a tap before thus will take off maximum 3-5 liters, SBP labs  - Hepatitis C panel negative  - Hepatitis B panel negative  - Acute hepatitis panel negative  - Autoimmune hepatitis Ab pending  - Paracentesis today with SBP labs + ascites lipase and amylase to assess for pancreatic pseudocyst leak      CT abdo/pelvis 1/28/19  - Liver cirrhotic morphology - interval increase in ascites   - Hypodense air-fluid collection along the body of the pancreas - may relate to pancreatic necrosis with pancreatic pseudocysts with infected pseudocyst not excluded  - Diffuse anasarca  - Sleeve gastrectomy.  - Extensive atherosclerosis       Nutrition disorder    - Sleeve Gastrectomy with Dr. Villavicencio on 12/22/2015  - VIt D low at 22  - Calcium low, but corrects to within normal range  - Iron studies pending    Plan:  - Nutrition consult  - Iron studies pending  - Continue home creon TIDWM  - Continue home B12 repletion  - Continue home  "iron repletion  - Continue home Ca repletion           Hypertension    - Continuing home lopressor 50 BID  - Continue ramipril 2.5mg  - Observing       Diabetic polyneuropathy associated with type 2 diabetes mellitus    Morning fasting 196    Plan:  - Detemir 8U QAM  - Moderate SSI       Sleep apnea    Continue home CPAP       Coronary artery disease due to calcified coronary lesion    - Hx 5 stents  - Hx CABG    - Continue ASA 81 (pt reportedly taking daily 325)  - Continue lipitor 40mg daily (was on 80 daily, reducing due to chronic liver dysfunction)  - Continue hoome lopressor 50mg BID  - Continue home ramipril 2.5mg     Hemoglobin drop    Hb drop for 11.1 to 8.5    Plan:  - Monitor with repeat CBCs  - Holding lovenox   - Pt reports not on plavix  - Continuing ASA81       HLD (hyperlipidemia)    Continue lipitor, but at reduced dose of 40mg rather than home 80mg due to chronic liver disease/ cirrhosis       Diverticulosis           Anasarca           Other ascites           Diabetic polyneuropathy           Type 2 diabetes mellitus, with long-term current use of insulin           Morbid obesity with BMI of 50.0-59.9, adult    Hx gastric sleeve  See "nutritional deficiencies"         VTE Risk Mitigation (From admission, onward)        Ordered     IP VTE HIGH RISK PATIENT  Once      01/29/19 0657              Nick Oreilly MD  Department of Hospital Medicine   [facility]  "

## 2019-01-29 NOTE — MEDICAL/APP STUDENT
"Mr. Jian Arrieta is a 63M with a PMHx of charcot foot, diabetes (dx 5 years ago), lymphedema, hydrocele, pancreatitis (last episode in 9/2018), pancreatic pseudocyst, cholelithiasis (12/2018) and arthritis of his knees (hyaluronic acid injections x3) who presented to ED for periumbilical and right sided flank pain, abdominal distention, and bilateral lower extremity edema that worsened 3-4 days ago. He notes fatigue, SOB but no cough, fever or sweats. He also notes abdominal pain only on palpation but no tenderness otherwise. He is positive for diarrhea that initially began since 2018 but constipation within the past day or so, urinary incontinence, and nausea with no vomiting. He has weeping ulcers on the back of his right leg and left shin. He on his way to [place] to receive a CT scan of his pancreas but decided to come to [facility] for a proper workup due to fatigued and inability to drive. He needed extra help to get into a taxi (which is unusual for him). He is a retired  since 4 years ago and notes his health problems have worsened since then. His presenting complaint of abdominal pain initially started in 9/2018 with associated sx of SOB, no appetite, diaphoresis, n/v, and diarrhea yellow in color. He was later dx with necrotizing pancreatitis and also pseudocyst.       PMHx:  - charcot foot due to DM  - atherosclerosis  - skin cancer  - anasarca   - DM (2014)  - chronic pancreatitis  - colon polyps (2014)  - diverticulosis  - gastric ulcer  - hydrocele  - hyperlipidemia  - HTN  - lymphedema  - perianal cyst (2017)  - BMI 51.69    PSurgHx:  - angioplasty (total of 5 stents)   - triple bypass (2018)  - cyst removal   - knee arthroscopy   - perianal surgery (cyst removal)    FHx:  - mother - bone cancer?  - father - brain cancer  - brother - multiple myeloma, parkinsonism    Social Hx:  - Alcohol: says he quit drinking on one account, but also stated wife says he "drinks more than he should"  - " Tobacco: Former smoker of 48 years with a 96 pack-year history   - Rec drugs: marijuana     Outpt Meds:  - Atorvastatin 80mg  - Ramipril 2.5mg  - Zetia 10mg  - Aspirin 325mg  - Calcium gummy 4  - Leveramier 8 units  - Novalog 4/4/4 breakfast, lunch, supper    Scheduled Meds:    aspirin  81 mg Oral Daily    atorvastatin  40 mg Oral Daily    cefTRIAXone (ROCEPHIN) IVPB  2 g Intravenous Q24H    cyanocobalamin  250 mcg Oral Daily    enoxparin  40 mg Subcutaneous Q12H    insulin detemir U-100  8 Units Subcutaneous QHS    lipase-protease-amylase 12,000-38,000-60,000 units  1 capsule Oral TID WM    metoprolol tartrate  50 mg Oral BID    polyethylene glycol  1 g Oral Daily    ramipril  2.5 mg Oral Daily      Continuous Infusions:   PRN Meds:dextrose 50%, glucagon (human recombinant), glucose, glucose, insulin aspart U-100, sodium chloride 0.9%, trazodone        VS:  Tmax: 99.1  NJ:    RR: 16-20 MR 20  BP sys: 102-134   BP espinal: 56-72 /72  O2: 97%-100% MR 98% on room air  Wt: 330lb      PE:  General: AOx3, comfortable, no respiratory distress, obese   CVS: lower extremity edema,   Resp:  Abdo: Abdominal distention,     Labs:                  A/P:  1) Abdominal distention  - paracentesis today

## 2019-01-29 NOTE — PROGRESS NOTES
Report called to floor RN, all questions answered. 5000mL removed from left side of abdomen, dressing CDI. Pt to return to floor with transport team.

## 2019-01-29 NOTE — HPI
This is 64 y/o male with multiple medical comorbidities including morbid obesity, CAD, sleeve gastrectomy 2015, acute pancreatitis 9/2018 now complicated by WOPN (suspected etoh induced), decompensated cirrhosis who presents for worsening abdominal distention and worsening LE edema.   AES now consulted for pancreatic pseudocyst.    Pt with symptoms of abdominal distention that has worsened over the past few weeks. He is tolerating regular diet without early satiety. He has no fevers or chills. He has loose stool which is chronic. He has very mild abdominal pain related to the distention. He has no pain with PO intake.     CT on admit.  Liver has a cirrhotic morphology with no focal lesions.  Significant interval increase in ascites when compared to prior exam which may account for patient's abdominal distension.  Hypodense air-fluid collection along the body of the pancreas which is slightly smaller when compared to prior CT.  Findings may relate to pancreatic necrosis with pancreatic pseudocysts with infected pseudocyst not excluded.  Mass effect with narrowing of the portal confluence in the region although the vessels appear patent.  Diffuse anasarca.  Sleeve gastrectomy.  Extensive atherosclerosis.

## 2019-01-29 NOTE — ASSESSMENT & PLAN NOTE
ASCITES  ANASARCA  STASIS DERMATITIS OF BOTH LEGS    - Newly diagnosed cirrhosis  - Pt severely fluid overloaded  - Cirrhotic liver appearance on admission CT abdo/pelvis  - Questionable etiology, possible alcohol involvement but unclear at this time      Plan:  - Non-alcoholic cirrhosis workup -- Hep panel, anti-smooth muscle  - Diagnostic and therapeutic paracentesis - reportedly never had a tap before thus will take off maximum 3-5 liters, SBP labs  - Hepatitis C panel negative  - Hepatitis B panel negative  - Acute hepatitis panel negative  - Autoimmune hepatitis Ab pending  - Paracentesis today with SBP labs + ascites lipase and amylase to assess for pancreatic pseudocyst leak      CT abdo/pelvis 1/28/19  - Liver cirrhotic morphology - interval increase in ascites   - Hypodense air-fluid collection along the body of the pancreas - may relate to pancreatic necrosis with pancreatic pseudocysts with infected pseudocyst not excluded  - Diffuse anasarca  - Sleeve gastrectomy.  - Extensive atherosclerosis

## 2019-01-29 NOTE — CONSULTS
[facility]  Adult Nutrition  Education Note    Diet Education: Low Sodium  Time Spent: 15 min  Learners: Pt      Nutrition Education provided with handouts: Low Sodium Nutrition Therapy      Comments: Pt not following low Na diet at home - eats out 3-4x weekly, eats Lean Cuisine. Provided and explained handout detailing common high sodium foods and strategies for reducing sodium intake including label reading, choosing reduced sodium foods, and cooking tips. Encouraged pt to consume adequate protein. Pt voiced understanding but seemed reluctant to adhere to recommendations.    Please add low sodium restriction to diet order.    Follow-Up: 1x weekly    Please re-consult as needed.

## 2019-01-29 NOTE — ASSESSMENT & PLAN NOTE
Has WOPN and this has actually decreased in size since prior CT.  Has abdominal fullness related to ascitic fluid.  Otherwise, has no active symptoms related to his WOPN / pseudocyst.  With decompensated cirrhosis as well.    Of note, he is on plavix at home.    Recs:  Paracentesis with albumin, tot protein, cell counts and diff, cultures, and send amylase on fluid as well  No current plans for endoscopic drainage of WOPN / pseudocyst.    Will follow up ascitic fluid studies.

## 2019-01-29 NOTE — NURSING
Return to unit in stable condtion from IR pancentesis . V/s stable and 5 litter extracted from left abdomen

## 2019-01-29 NOTE — ASSESSMENT & PLAN NOTE
- Sleeve Gastrectomy with Dr. Villavicencio on 12/22/2015  - VIt D low at 22  - Calcium low, but corrects to within normal range  - Iron studies pending    Plan:  - Nutrition consult  - Iron studies pending  - Continue home creon TIDWM  - Continue home B12 repletion  - Continue home iron repletion  - Continue home Ca repletion

## 2019-01-29 NOTE — CONSULTS
[facility]  AES  Consult Note    Patient Name: Jian Arrieta  MRN: [id number]  Admission Date: 1/28/2019  Hospital Length of Stay: 1 days  Code Status: Full Code   Attending Provider: Clemente Feliz MD   Consulting Provider: Tanner Mitchell MD  Primary Care Physician: Samir Mahmood MD  Principal Problem:Chronic pancreatitis    Inpatient consult to [facility] ([facility])  Consult performed by: Tanner Mitchell MD  Consult ordered by: Jian Lambert MD        Subjective:     HPI:  This is 64 y/o male with multiple medical comorbidities including morbid obesity, CAD, sleeve gastrectomy 2015, acute pancreatitis 9/2018 now complicated by WOPN (suspected etoh induced), decompensated cirrhosis who presents for worsening abdominal distention and worsening LE edema.   AES now consulted for pancreatic pseudocyst.    Pt with symptoms of abdominal distention that has worsened over the past few weeks. He is tolerating regular diet without early satiety. He has no fevers or chills. He has loose stool which is chronic. He has very mild abdominal pain related to the distention. He has no pain with PO intake.     CT on admit.  Liver has a cirrhotic morphology with no focal lesions.  Significant interval increase in ascites when compared to prior exam which may account for patient's abdominal distension.  Hypodense air-fluid collection along the body of the pancreas which is slightly smaller when compared to prior CT.  Findings may relate to pancreatic necrosis with pancreatic pseudocysts with infected pseudocyst not excluded.  Mass effect with narrowing of the portal confluence in the region although the vessels appear patent.  Diffuse anasarca.  Sleeve gastrectomy.  Extensive atherosclerosis.    Past Medical History:   Diagnosis Date    Anasarca 1/28/2019    Atherosclerosis     Cancer     skin cancer    Charcot foot due to diabetes mellitus     Charcot foot due to diabetes mellitus     Chest  pain     Chronic pancreatitis 2019    Cirrhosis of liver with ascites 2019    Colon polyps     approx 5 yrs ago    Coronary artery disease     Diabetes mellitus     Diverticulosis 2019    Gastric ulcer     Hydrocele     approx 1.5 yrs ago    Hyperlipidemia     Hypertension 2019    Lymphedema     Perianal cyst     approx 2 yrs ago    Pseudocyst of pancreas 2019       Past Surgical History:   Procedure Laterality Date    ANGIOPLASTY      total x5 stents    CARDIAC SURGERY      COLONOSCOPY N/A 10/6/2015    [facility]formed [facility]khar Richards [facility] at [facility] (2ND FLR)    CYST REMOVAL      GASTRECTOMY      GASTRECTOMY-SLEEVE-LAPAROSCOPIC - [id number] N/A 2015    Performed by Micheal Villavicencio Jr., MD at [facility] OR 2ND FLR    KNEE ARTHROSCOPY      perianal surgery      perianal cyst removed       Review of patient's allergies indicates:  No Known Allergies  Family History     Problem Relation (Age of Onset)    Cancer Mother, Father, Paternal Grandfather, Brother    Diabetes Maternal Grandmother    No Known Problems Paternal Grandmother    Obesity Sister    Parkinsonism Brother    Stroke Maternal Grandfather        Tobacco Use    Smoking status: Former Smoker     Packs/day: 2.00     Years: 30.00     Pack years: 60.00     Types: Cigarettes     Last attempt to quit: 2005     Years since quittin.0    Smokeless tobacco: Never Used   Substance and Sexual Activity    Alcohol use: Yes     Alcohol/week: 0.0 - 0.6 oz    Drug use: No    Sexual activity: Yes     Review of Systems   Constitutional: Positive for activity change and fatigue. Negative for chills and fever.   HENT: Negative for facial swelling, mouth sores and trouble swallowing.    Eyes: Negative for pain and redness.   Respiratory: Negative for cough and shortness of breath.    Cardiovascular: Positive for leg swelling. Negative for chest pain.   Gastrointestinal: Positive for abdominal distention and diarrhea. Negative  for abdominal pain, blood in stool and vomiting.   Genitourinary: Negative for dysuria and hematuria.   Musculoskeletal: Negative for gait problem and neck stiffness.   Skin: Positive for wound. Negative for rash.   Neurological: Negative for seizures and headaches.   Psychiatric/Behavioral: Negative for confusion and hallucinations.     Objective:     Vital Signs (Most Recent):  Temp: 97.9 °F (36.6 °C) (01/29/19 0924)  Pulse: 104 (01/29/19 0924)  Resp: 18 (01/29/19 0924)  BP: 113/68 (01/29/19 0924)  SpO2: 97 % (01/29/19 0924) Vital Signs (24h Range):  Temp:  [97.3 °F (36.3 °C)-99.1 °F (37.3 °C)] 97.9 °F (36.6 °C)  Pulse:  [] 104  Resp:  [16-20] 18  SpO2:  [97 %-100 %] 97 %  BP: (102-134)/(56-72) 113/68     Weight: (!) 149.7 kg (330 lb) (01/29/19 0400)  Body mass index is 51.79 kg/m².      Intake/Output Summary (Last 24 hours) at 1/29/2019 1020  Last data filed at 1/29/2019 0100  Gross per 24 hour   Intake 630 ml   Output 530 ml   Net 100 ml       Lines/Drains/Airways     Peripheral Intravenous Line                 Peripheral IV - Single Lumen 01/28/19 0847 Left Hand 1 day                Physical Exam   Constitutional: He is oriented to person, place, and time. No distress.   Chronically ill appearing , nontoxic   HENT:   Head: Normocephalic and atraumatic.   Eyes: Conjunctivae are normal. No scleral icterus.   Neck: Neck supple. No thyromegaly present.   Cardiovascular: Normal rate and intact distal pulses.   Pulmonary/Chest: Effort normal. No respiratory distress.   Abdominal: Soft. He exhibits distension. He exhibits no mass. There is no tenderness. There is no guarding.   Musculoskeletal: Normal range of motion. He exhibits edema. He exhibits no tenderness.   Neurological: He is alert and oriented to person, place, and time. No cranial nerve deficit.   Skin: Skin is warm and dry. No rash noted. There is pallor.   Psychiatric: He has a normal mood and affect. His behavior is normal.   Vitals  reviewed.      Significant Labs:  Amylase: No results for input(s): AMYLASE in the last 48 hours.  Blood Culture: No results for input(s): LABBLOO in the last 48 hours.  CBC:   Recent Labs   Lab 01/28/19  0847 01/28/19  0855 01/29/19  0732   WBC 11.62  --  5.71   HGB 11.1*  --  8.5*   HCT 35.0* 32* 27.1*   *  --  239     CMP:   Recent Labs   Lab 01/29/19  0732   *   CALCIUM 7.5*   ALBUMIN 1.5*   PROT 4.8*   *   K 4.4   CO2 24      BUN 19   CREATININE 0.9   ALKPHOS 101   ALT 11   AST 14   BILITOT 0.4     Coagulation:   Recent Labs   Lab 01/28/19  1349   INR 1.1     CRP:   Recent Labs   Lab 01/28/19  1817   CRP 71.5*     ESR: No results for input(s): SEDRATE in the last 48 hours.  H.Pylori Ab IgG: No results for input(s): HPYLORIIGG in the last 48 hours.  Lipase:   Recent Labs   Lab 01/28/19  0847   LIPASE 4       Significant Imaging:  Imaging results within the past 24 hours have been reviewed.    Assessment/Plan:     Pseudocyst of pancreas    Has WOPN and this has actually decreased in size since prior CT.  Has abdominal fullness related to ascitic fluid.  Otherwise, has no active symptoms related to his WOPN / pseudocyst.  With decompensated cirrhosis as well.    Of note, he is on plavix at home.    Recs:  Paracentesis with albumin, tot protein, cell counts and diff, cultures, and send amylase on fluid as well  No current plans for endoscopic drainage of WOPN / pseudocyst.    Will follow up ascitic fluid studies.         Thank you for your consult. pending ascitic fluid analysis, will sign off.    Tanner Mitchell MD  Gastroenterology  [facility]

## 2019-01-29 NOTE — H&P
Inpatient Radiology Pre-procedure Note    History of Present Illness:  Jian Arrieta is a 63 y.o. male who presents for ultrasound guided paracentesis.  Admission H&P reviewed.  Past Medical History:   Diagnosis Date    Anasarca 1/28/2019    Atherosclerosis     Cancer     skin cancer    Charcot foot due to diabetes mellitus     Charcot foot due to diabetes mellitus     Chest pain     Chronic pancreatitis 1/28/2019    Cirrhosis of liver with ascites 1/28/2019    Colon polyps     approx 5 yrs ago    Coronary artery disease     Diabetes mellitus     Diverticulosis 1/28/2019    Gastric ulcer     Hydrocele     approx 1.5 yrs ago    Hyperlipidemia     Hypertension 1/28/2019    Lymphedema     Perianal cyst     approx 2 yrs ago    Pseudocyst of pancreas 1/28/2019     Past Surgical History:   Procedure Laterality Date    ANGIOPLASTY      total x5 stents    CARDIAC SURGERY      COLONOSCOPY N/A 10/6/2015    Performed by [facility]khar Richards MD at [facility] ENDO (2ND FLR)    CYST REMOVAL      GASTRECTOMY      GASTRECTOMY-SLEEVE-LAPAROSCOPIC - [id number] N/A 12/22/2015    Performed by Micheal Villavicencio Jr., MD at [facility] OR 2ND FLR    KNEE ARTHROSCOPY      perianal surgery      perianal cyst removed       Review of Systems:   As documented in primary team H&P    Home Meds:   Prior to Admission medications    Medication Sig Start Date End Date Taking? Authorizing Provider   aspirin 325 MG tablet Take 325 mg by mouth once daily.   Yes Historical Provider, MD   atorvastatin (LIPITOR) 80 MG tablet Take 80 mg by mouth once daily.   Yes Historical Provider, MD   cyanocobalamin, vitamin B-12, 500 mcg Subl Place 1 tablet under the tongue every Monday.    Yes Historical Provider, MD   ezetimibe (ZETIA) 10 mg tablet Take 10 mg by mouth once daily.   Yes Historical Provider, MD   insulin aspart U-100 (NOVOLOG) 100 unit/mL injection Inject 4 Units into the skin 3 (three) times daily before meals.   Yes Historical Provider, MD    insulin detemir (LEVEMIR FLEXPEN SUBQ) Inject 12 Units into the skin once daily.    Yes Historical Provider, MD   lipase-protease-amylase (CREON) 36,000-114,000- 180,000 unit CpDR Take 1 capsule by mouth 3 (three) times daily.    Yes Historical Provider, MD   omeprazole (PRILOSEC) 40 MG capsule Take 1 capsule (40 mg total) by mouth once daily. 12/23/15 1/28/19 Yes Beltran Moreno MD   ONETOUCH ULTRA TEST Strp 4 (four) times daily. Use to test blood sugar four times a day. 10/15/15  Yes Historical Provider, MD   pediatric multivit-iron-min Chew Take 1 tablet by mouth 2 (two) times daily.   Yes Historical Provider, MD   ramipril (ALTACE) 2.5 MG capsule Take 2.5 mg by mouth once daily.   Yes Historical Provider, MD     Scheduled Meds:    aspirin  81 mg Oral Daily    atorvastatin  40 mg Oral Daily    cefTRIAXone (ROCEPHIN) IVPB  2 g Intravenous Q24H    cyanocobalamin  250 mcg Oral Daily    enoxparin  40 mg Subcutaneous Q12H    insulin detemir U-100  8 Units Subcutaneous Daily    lipase-protease-amylase 12,000-38,000-60,000 units  1 capsule Oral TID WM    metoprolol tartrate  50 mg Oral BID    polyethylene glycol  1 g Oral Daily    ramipril  2.5 mg Oral Daily     Continuous Infusions:   PRN Meds:dextrose 50%, glucagon (human recombinant), glucose, glucose, insulin aspart U-100, sodium chloride 0.9%, trazodone  Anticoagulants/Antiplatelets: aspirin and Lovenox    Allergies: Review of patient's allergies indicates:  No Known Allergies  Sedation Hx: have not been any systemic reactions    Labs:  Recent Labs   Lab 01/28/19  1349   INR 1.1       Recent Labs   Lab 01/29/19  0732   WBC 5.71   HGB 8.5*   HCT 27.1*   MCV 92         Recent Labs   Lab 01/29/19  0732   *   *   K 4.4      CO2 24   BUN 19   CREATININE 0.9   CALCIUM 7.5*   MG 1.6   ALT 11   AST 14   ALBUMIN 1.5*   BILITOT 0.4         Vitals:  Temp: 97.9 °F (36.6 °C) (01/29/19 0924)  Pulse: 104 (01/29/19 0924)  Resp: 18 (01/29/19  0924)  BP: 113/68 (01/29/19 0924)  SpO2: 97 % (01/29/19 0924)     Physical Exam:  ASA: 3  Mallampati: n/a    General: no acute distress  Mental Status: alert and oriented to person, place and time  HEENT: normocephalic, atraumatic  Chest: unlabored breathing  Heart: regular heart rate  Abdomen: distended  Extremity: moves all extremities    Plan: ultrasound guided paracentesis  Sedation Plan: local    ANDREW Valerio, FNP  Interventional Radiology  [phone] [employer]

## 2019-01-29 NOTE — NURSING
Pt arrived to unit via stretcher from er with dx of chronic pancreatitis , sob and bilateral lower extremities 3 + edema aao*4 can make needs known, can ambulate without assistance to bathroom pt had a ct today and resulted cirrhosis of the liver and ascites plan of care is to diuresis  and perform paracentesis per md during this hospital stay.

## 2019-01-29 NOTE — PLAN OF CARE
Problem: Adult Inpatient Plan of Care  Goal: Plan of Care Review  Outcome: Ongoing (interventions implemented as appropriate)  Mr. Leavitt new admit from yesterday. Settling in and rested about 6 hours. Refusing Visi and C-Pap. Refused his medications last night as he feared having side effects from the diabetes medication as he takes it differently at home. IR consulted for possible paracentesis today. Will continue to monitor.

## 2019-01-30 VITALS
HEART RATE: 78 BPM | WEIGHT: 315 LBS | TEMPERATURE: 98 F | OXYGEN SATURATION: 98 % | RESPIRATION RATE: 18 BRPM | BODY MASS INDEX: 49.44 KG/M2 | SYSTOLIC BLOOD PRESSURE: 115 MMHG | DIASTOLIC BLOOD PRESSURE: 52 MMHG | HEIGHT: 67 IN

## 2019-01-30 LAB
ALBUMIN SERPL BCP-MCNC: 2 G/DL
ALP SERPL-CCNC: 76 U/L
ALT SERPL W/O P-5'-P-CCNC: 9 U/L
ANION GAP SERPL CALC-SCNC: 5 MMOL/L
AST SERPL-CCNC: 13 U/L
BASOPHILS # BLD AUTO: 0.01 K/UL
BASOPHILS NFR BLD: 0.3 %
BILIRUB SERPL-MCNC: 0.4 MG/DL
BUN SERPL-MCNC: 16 MG/DL
CALCIUM SERPL-MCNC: 7.6 MG/DL
CHLORIDE SERPL-SCNC: 106 MMOL/L
CO2 SERPL-SCNC: 25 MMOL/L
CREAT SERPL-MCNC: 1 MG/DL
DIFFERENTIAL METHOD: ABNORMAL
EOSINOPHIL # BLD AUTO: 0 K/UL
EOSINOPHIL NFR BLD: 0.8 %
ERYTHROCYTE [DISTWIDTH] IN BLOOD BY AUTOMATED COUNT: 14.3 %
EST. GFR  (AFRICAN AMERICAN): >60 ML/MIN/1.73 M^2
EST. GFR  (NON AFRICAN AMERICAN): >60 ML/MIN/1.73 M^2
GLUCOSE SERPL-MCNC: 176 MG/DL
HCT VFR BLD AUTO: 25.2 %
HGB BLD-MCNC: 8.2 G/DL
IMM GRANULOCYTES # BLD AUTO: 0.01 K/UL
IMM GRANULOCYTES NFR BLD AUTO: 0.3 %
LIPASE FLD-CCNC: 3 U/L
LYMPHOCYTES # BLD AUTO: 0.9 K/UL
LYMPHOCYTES NFR BLD: 24 %
MAGNESIUM SERPL-MCNC: 1.5 MG/DL
MCH RBC QN AUTO: 29.9 PG
MCHC RBC AUTO-ENTMCNC: 32.5 G/DL
MCV RBC AUTO: 92 FL
MONOCYTES # BLD AUTO: 0.4 K/UL
MONOCYTES NFR BLD: 11.5 %
NEUTROPHILS # BLD AUTO: 2.4 K/UL
NEUTROPHILS NFR BLD: 63.1 %
NRBC BLD-RTO: 0 /100 WBC
PATH INTERP FLD-IMP: NORMAL
PHOSPHATE SERPL-MCNC: 2.6 MG/DL
PLATELET # BLD AUTO: 201 K/UL
PMV BLD AUTO: 9.1 FL
POCT GLUCOSE: 179 MG/DL (ref 70–110)
POCT GLUCOSE: 184 MG/DL (ref 70–110)
POCT GLUCOSE: 302 MG/DL (ref 70–110)
POTASSIUM SERPL-SCNC: 4.1 MMOL/L
PROT SERPL-MCNC: 4.6 G/DL
RBC # BLD AUTO: 2.74 M/UL
SMOOTH MUSCLE AB TITR SER IF: ABNORMAL {TITER}
SODIUM SERPL-SCNC: 136 MMOL/L
SPECIMEN SOURCE: NORMAL
WBC # BLD AUTO: 3.84 K/UL

## 2019-01-30 PROCEDURE — 63600175 PHARM REV CODE 636 W HCPCS: Performed by: STUDENT IN AN ORGANIZED HEALTH CARE EDUCATION/TRAINING PROGRAM

## 2019-01-30 PROCEDURE — 85025 COMPLETE CBC W/AUTO DIFF WBC: CPT

## 2019-01-30 PROCEDURE — 83735 ASSAY OF MAGNESIUM: CPT

## 2019-01-30 PROCEDURE — 63600175 PHARM REV CODE 636 W HCPCS: Mod: JG | Performed by: STUDENT IN AN ORGANIZED HEALTH CARE EDUCATION/TRAINING PROGRAM

## 2019-01-30 PROCEDURE — 99239 HOSP IP/OBS DSCHRG MGMT >30: CPT | Mod: ,,, | Performed by: HOSPITALIST

## 2019-01-30 PROCEDURE — 99239 PR HOSPITAL DISCHARGE DAY,>30 MIN: ICD-10-PCS | Mod: ,,, | Performed by: HOSPITALIST

## 2019-01-30 PROCEDURE — P9047 ALBUMIN (HUMAN), 25%, 50ML: HCPCS | Mod: JG | Performed by: STUDENT IN AN ORGANIZED HEALTH CARE EDUCATION/TRAINING PROGRAM

## 2019-01-30 PROCEDURE — 36415 COLL VENOUS BLD VENIPUNCTURE: CPT

## 2019-01-30 PROCEDURE — 25000003 PHARM REV CODE 250: Performed by: STUDENT IN AN ORGANIZED HEALTH CARE EDUCATION/TRAINING PROGRAM

## 2019-01-30 PROCEDURE — 80053 COMPREHEN METABOLIC PANEL: CPT

## 2019-01-30 PROCEDURE — 84100 ASSAY OF PHOSPHORUS: CPT

## 2019-01-30 RX ORDER — FUROSEMIDE 40 MG/1
40 TABLET ORAL DAILY
Qty: 90 TABLET | Refills: 0 | Status: SHIPPED | OUTPATIENT
Start: 2019-01-31 | End: 2019-01-30

## 2019-01-30 RX ORDER — ALBUMIN HUMAN 250 G/1000ML
25 SOLUTION INTRAVENOUS ONCE
Status: COMPLETED | OUTPATIENT
Start: 2019-01-30 | End: 2019-01-30

## 2019-01-30 RX ORDER — ATORVASTATIN CALCIUM 40 MG/1
40 TABLET, FILM COATED ORAL DAILY
Qty: 90 TABLET | Refills: 0 | Status: SHIPPED | OUTPATIENT
Start: 2019-01-31 | End: 2019-02-04 | Stop reason: SDUPTHER

## 2019-01-30 RX ORDER — ATORVASTATIN CALCIUM 40 MG/1
40 TABLET, FILM COATED ORAL DAILY
Qty: 90 TABLET | Refills: 0 | Status: SHIPPED | OUTPATIENT
Start: 2019-01-31 | End: 2019-01-30

## 2019-01-30 RX ORDER — CHOLECALCIFEROL (VITAMIN D3) 25 MCG
1000 TABLET ORAL DAILY
COMMUNITY
Start: 2019-01-31 | End: 2019-05-30

## 2019-01-30 RX ORDER — SPIRONOLACTONE 25 MG/1
100 TABLET ORAL DAILY
Status: DISCONTINUED | OUTPATIENT
Start: 2019-01-31 | End: 2019-01-30 | Stop reason: HOSPADM

## 2019-01-30 RX ORDER — CHOLECALCIFEROL (VITAMIN D3) 25 MCG
1000 TABLET ORAL DAILY
COMMUNITY
Start: 2019-01-31 | End: 2019-01-30

## 2019-01-30 RX ORDER — METOPROLOL SUCCINATE 100 MG/1
100 TABLET, EXTENDED RELEASE ORAL DAILY
Qty: 90 TABLET | Refills: 0 | Status: SHIPPED | OUTPATIENT
Start: 2019-01-31 | End: 2019-01-30

## 2019-01-30 RX ORDER — METOPROLOL SUCCINATE 100 MG/1
100 TABLET, EXTENDED RELEASE ORAL DAILY
Status: DISCONTINUED | OUTPATIENT
Start: 2019-01-31 | End: 2019-01-30 | Stop reason: HOSPADM

## 2019-01-30 RX ORDER — FUROSEMIDE 40 MG/1
40 TABLET ORAL DAILY
Status: DISCONTINUED | OUTPATIENT
Start: 2019-01-31 | End: 2019-01-30 | Stop reason: HOSPADM

## 2019-01-30 RX ORDER — FERROUS SULFATE 325(65) MG
325 TABLET, DELAYED RELEASE (ENTERIC COATED) ORAL DAILY
Status: DISCONTINUED | OUTPATIENT
Start: 2019-01-30 | End: 2019-01-30 | Stop reason: HOSPADM

## 2019-01-30 RX ORDER — FERROUS SULFATE 325(65) MG
325 TABLET, DELAYED RELEASE (ENTERIC COATED) ORAL DAILY
Refills: 0 | COMMUNITY
Start: 2019-01-30 | End: 2019-02-27

## 2019-01-30 RX ORDER — FUROSEMIDE 10 MG/ML
40 INJECTION INTRAMUSCULAR; INTRAVENOUS 2 TIMES DAILY
Status: DISCONTINUED | OUTPATIENT
Start: 2019-01-30 | End: 2019-01-30 | Stop reason: HOSPADM

## 2019-01-30 RX ORDER — METOPROLOL SUCCINATE 100 MG/1
100 TABLET, EXTENDED RELEASE ORAL DAILY
Qty: 90 TABLET | Refills: 0 | Status: SHIPPED | OUTPATIENT
Start: 2019-01-31 | End: 2019-02-04 | Stop reason: SDUPTHER

## 2019-01-30 RX ORDER — SPIRONOLACTONE 100 MG/1
100 TABLET, FILM COATED ORAL DAILY
Qty: 90 TABLET | Refills: 0 | Status: SHIPPED | OUTPATIENT
Start: 2019-01-31 | End: 2019-02-04 | Stop reason: SDUPTHER

## 2019-01-30 RX ORDER — FUROSEMIDE 40 MG/1
40 TABLET ORAL DAILY
Qty: 90 TABLET | Refills: 0 | Status: SHIPPED | OUTPATIENT
Start: 2019-01-31 | End: 2019-02-04 | Stop reason: SDUPTHER

## 2019-01-30 RX ORDER — FERROUS SULFATE 325(65) MG
325 TABLET, DELAYED RELEASE (ENTERIC COATED) ORAL DAILY
Refills: 0 | COMMUNITY
Start: 2019-01-30 | End: 2019-01-30

## 2019-01-30 RX ADMIN — CYANOCOBALAMIN TAB 250 MCG 250 MCG: 250 TAB at 08:01

## 2019-01-30 RX ADMIN — ALBUMIN HUMAN 25 G: 0.25 SOLUTION INTRAVENOUS at 04:01

## 2019-01-30 RX ADMIN — INSULIN ASPART 4 UNITS: 100 INJECTION, SOLUTION INTRAVENOUS; SUBCUTANEOUS at 11:01

## 2019-01-30 RX ADMIN — PANCRELIPASE 1 CAPSULE: 60000; 12000; 38000 CAPSULE, DELAYED RELEASE PELLETS ORAL at 08:01

## 2019-01-30 RX ADMIN — FERROUS SULFATE TAB EC 325 MG (65 MG FE EQUIVALENT) 325 MG: 325 (65 FE) TABLET DELAYED RESPONSE at 11:01

## 2019-01-30 RX ADMIN — ASPIRIN 81 MG CHEWABLE TABLET 81 MG: 81 TABLET CHEWABLE at 08:01

## 2019-01-30 RX ADMIN — FUROSEMIDE 40 MG: 10 INJECTION, SOLUTION INTRAVENOUS at 08:01

## 2019-01-30 RX ADMIN — INSULIN DETEMIR 8 UNITS: 100 INJECTION, SOLUTION SUBCUTANEOUS at 10:01

## 2019-01-30 RX ADMIN — METOPROLOL TARTRATE 50 MG: 50 TABLET ORAL at 09:01

## 2019-01-30 RX ADMIN — VITAMIN D, TAB 1000IU (100/BT) 1000 UNITS: 25 TAB at 08:01

## 2019-01-30 RX ADMIN — ATORVASTATIN CALCIUM 40 MG: 20 TABLET, FILM COATED ORAL at 08:01

## 2019-01-30 RX ADMIN — PANCRELIPASE 1 CAPSULE: 60000; 12000; 38000 CAPSULE, DELAYED RELEASE PELLETS ORAL at 11:01

## 2019-01-30 NOTE — NURSING
Discharge instructions reviewed with patient. Patient gave verbal understanding. All hospital equipment removed from patient's room. No apparent distress or discomfort present.

## 2019-01-30 NOTE — PLAN OF CARE
Problem: Adult Inpatient Plan of Care  Goal: Plan of Care Review  Outcome: Ongoing (interventions implemented as appropriate)  Pt in room with no s/s of distress. Pt bp low overnight. Pt asymptomatic. Pt given albumin this am with a small increase in bp. Pt not wearing cpap overnight and refuses it. Part of reason is pt states it has no humidification. Pt encouraged to use by respiratory and nursing but pt still refusing. Pt pulses checked with dopplar overnight due to edema in feet. Pt has no c/o pain. Monitoring.

## 2019-01-30 NOTE — MEDICAL/APP STUDENT
S: Mr Jian Arrieta is 63M with PMHx of CAD (5 stents), HLD, Charcot foot, DM2, chronic pancreatitis, pancreatic pseudocysts, BERHANE, sleeve gastrectomy, perianal cyst, lymphedema who presented to ED on 1/28 for liver cirrhosis and ascites with anasarca. He presented to ED for abdominal distention w/pain only on palpation and worsening of lymphedema and ulcer on his lower extremitities. He noted consistent diarrhea since last pancreatitis episode in 9/2018. He has undergone Paracentesis and 5L were drained yesterday cloudy yellow in color with minimal blood loss.     OC:  No significant overnight changes, feels better after the paracentesis. Has had 5 BM since yesterday but no longer feels like it is uncontrollable diarrhea and were of normal color.        VS:  Tmax: 98.5  NE:   RR: 14-18  BP sys:   BP diast: 41-78 MR 99/55  O2 % MR 98%    I/O: Net -5450 yesterday    PE:   Gen: AOx3, no acute distress, comfortably sitting in chair, no central cyanosis  CVS: normal S1S2, no added heart sounds, bilateral pitting edema 3+ to midshin 2+ to knee  Resp: normal lung sounds, no cough or SOB  Abdo: abdominal distension but no guarding, or tenderness     Labs:            Img:  TTE revealed HFrEF EF 30%         A/P:  1) Abdominal distension  - continue diuresis     2) liver cirrhosis  - check amylase/lipase to see if pancreatic pseudocyst leak      2) chronic pancreatitis/ pancreatic pseudocyst  - schedule for re-imaging     3) Chronic combined systolic and diastolic HF  - continue metoprolol 50mg BID and ramipril 2.5mg

## 2019-01-30 NOTE — PHARMACY MED REC
"Admission Medication Reconciliation - Pharmacy Consult Note    The home medication history was taken by Diana Fox, pharmacy technician.  Based on information gathered and subsequent review by the clinical pharmacist, the items below may need attention.     You may go to "Admission" then "Reconcile Home Medications" tabs to review and/or act upon these items.     Potentially problematic discrepancies with current MAR  o Patient IS taking the following which was not ordered upon admit  o Zetia 10 mg QD (held)   o Omeprazole 40 mg QD   o Patient IS NOT taking the following which was ordered upon admit  o Metoprolol tartrate 50 mg BID (reports not taking)   o Patient is taking a drug DIFFERENTLY than how ordered upon admit  o  mg QD (ordered as ASA 81 mg QD)   o Creon 94052/[id number] Units TID with meals   o Cyanocobalamin 500 mcg on Monday     Please address this information as you see fit.  Feel free to contact us if you have any questions or require assistance.  Randa York  EXT [phone]   .    .            "

## 2019-01-30 NOTE — DISCHARGE INSTRUCTIONS
1. Please read about Cirrhosis attached- no mor alcohol    2. Follow up apt with [facility], PCP and Hepatology

## 2019-01-30 NOTE — PLAN OF CARE
"Patient currently goes to outpatient lymphedema therapy at [facility]; patient states that he doesn't need anything from this admission to continue. Patient would like to transfer all of his care to [facility], currently sees Podiatry and Ortho here. Follow up appt scheduled with [facility] to assist with coordinating care. Patient has some care covered by worker's comp. Per patient, "anything r/t my heart". CM explained that it is sometimes difficult to completely separate disease processes and tests but he can speak with his worker's comp manager. Patient would benefit from outpatient case mgmt; referral sent.      01/30/19 6136   Discharge Assessment   Assessment Type Discharge Planning Assessment   Confirmed/corrected address and phone number on facesheet? Yes   Assessment information obtained from? Patient;Medical Record   Expected Length of Stay (days) 3   Communicated expected length of stay with patient/caregiver yes   Prior to hospitilization cognitive status: Alert/Oriented   Prior to hospitalization functional status: Independent   Current cognitive status: Alert/Oriented   Current Functional Status: Independent   Lives With spouse   Able to Return to Prior Arrangements yes   Is patient able to care for self after discharge? Yes  (with assist)   Who are your caregiver(s) and their phone number(s)? wife and brothers can assist   Patient's perception of discharge disposition home or selfcare  (outpatient therapy)   Readmission Within the Last 30 Days no previous admission in last 30 days   Patient currently being followed by outpatient case management? No   Patient currently receives any other outside agency services? No   Equipment Currently Used at Home bath bench   Do you have any problems affording any of your prescribed medications? No   Is the patient taking medications as prescribed? yes   Does the patient have transportation home? Yes   Transportation Anticipated family or friend will provide  (brothers can pick " up)   Does the patient receive services at the [facility]? No   Discharge Plan A Home with family   DME Needed Upon Discharge  none   Patient/Family in Agreement with Plan yes

## 2019-01-31 ENCOUNTER — PATIENT OUTREACH (OUTPATIENT)
Dept: ADMINISTRATIVE | Facility: CLINIC | Age: 64
End: 2019-01-31

## 2019-01-31 ENCOUNTER — TELEPHONE (OUTPATIENT)
Dept: INTERNAL MEDICINE | Facility: CLINIC | Age: 64
End: 2019-01-31

## 2019-01-31 NOTE — PATIENT INSTRUCTIONS
Discharge Instructions for Cirrhosis of the Liver  You have been diagnosed with cirrhosis of the liver. This is a long-term (chronic) problem. It occurs when liver tissue is destroyed and replaced by scar tissue. Causes of cirrhosis include:  · Infection such as viral hepatitis  · Chronic alcoholism  · The bodys immune system attacks healthy cells (autoimmune disorders)  · Obesity  · Medicine side effects  · Genetic diseases  Sometimes the exact cause is unknown. You may not have any symptoms at first. Or your symptoms may be mild. But they usually get worse. Cirrhosis is likely to occur if you have a history of long-term alcohol abuse. Cirrhosis cant be cured. But it can be treated.   Home care  Alcohol  · People with liver disease should not drink alcohol. If you stop drinking, you may feel better and live longer.  · If you are a chronic alcohol user, you will have withdrawal symptoms. Talk with your healthcare provider for more information.    · If alcohol is a problem, ask your provider about medicine that can help you quit drinking.    · Find a local Alcoholics Anonymous support group online at www.aa.org.  Diet  · Ask your provider what kind of diet you should follow. You may be asked to limit or not eat certain foods. Do not limit your protein intake.  · Weigh yourself daily and keep a weight log. If you have a sudden change in weight, call your provider.  · Cut back on salt:  ¨ Limit canned, dried, packaged, and fast foods.  ¨ Dont add salt to your food at the table.  ¨ Season foods with herbs instead of salt when you cook.  Medicines, supplements, and vaccines  · Take your medicines exactly as directed.  · Talk to your provider before taking vitamins, over-the-counter medicines, or herbal supplements. Many herbal supplements may be poisonous (toxic) to the liver.  · Avoid aspirin and other blood-thinning medicines.  · Discuss vitamin supplements and deficiencies with your provider.  · Ask your provider  about getting vaccinations for viruses that can cause liver diseases.  Follow-up care  Follow up with your healthcare provider, or as advised. You will likely have the following tests:  · Lab tests  · Blood tests for liver cancer  · Ultrasound of your liver every 6 months  · Endoscopy to check for swollen veins (varices) in your digestive tract  When to call your provider  Call your healthcare provider right away if you have any of the following:  · Fever of 100.4°F (38.0°C) or higher  · Extreme tiredness (fatigue), weakness, or lack of appetite  · Vomiting (with or without blood)  · Yellowing of your skin or eyes (jaundice)  · Itching  · Swelling in your belly or legs  · Black or tarry stools  · Skin that bruises easily  · Confusion or trouble thinking clearly   Date Last Reviewed: 8/1/2016  © 8088-1407 [employer]. [address] [place] [address], [place] [address]. All rights reserved. This information is not intended as a substitute for professional medical care. Always follow your healthcare professional's instructions.

## 2019-01-31 NOTE — TELEPHONE ENCOUNTER
C3 nurse spoke with Jian for a TCC post hospital discharge follow up call. The patient has a scheduled HOSFU appointment with Alfonso Mahmood MD on 02/04/19 @ 0800 am.   Respectfully,   Brooklyn Abbott LPN   [facility]     [email or web address]     Thank you Dr. Mahmood!

## 2019-01-31 NOTE — DISCHARGE SUMMARY
[facility] Hospital Medicine  Discharge Summary      Patient Name: Jian Arrieta  MRN: [id number]  Admission Date: 1/28/2019  Hospital Length of Stay: 2 days  Discharge Date and Time:  01/30/2019 7:25 PM  Attending Physician: No att. providers found   Discharging Provider: Jian Lambert MD  Primary Care Provider: Samir Mahmood MD    Hospital Medicine Team: [facility] HOSP MED 3 Jian Lambert MD    HPI:   Patient is a 63-year-old male with medical history of CAD (5 stents on ASA plavix), HLD (on statin), Charcot foot, DMII (on insulin), BERHANE (on apap 10-20 QHS), sleeve gastrectomy (12/16), perianal cyst, right knee degenerative changes (hyaluronic acid injections via ortho X3), questionable hx of lymphedema.      Presenting to the ED for left-sided upper abdominal pain, abdominal distention, bilateral lower extremity edema.  Mr CAMPBELL had a bad episode of pancreatitis in September 2018 requiring inpatient stay for 5 days.  His symptoms included abdominal pain in the midepigastric region that radiated to his back and loose stools, CT scan showed severe inflammation, the etiology was thought to be alcohol.   He has occasional diarrhea and loose stool.  He is taking Creon for that, but is not following a low fat diet.  His blood sugar was noted to be uncontrolled during that admission.     Pt denies fever, no chills, no chest pain, no SOB, no purulent draining or erythema of legs.              Hospital Course:  In ED, patient given lasix 20 IV which produced good UOP.  CXR was unconcerning, specifically there was no evidence of pulmonary edema.  CT abdo revealed cirrhosis, anasarca and increased volume ascites.  TTE reveal HFrEF EF 30% with diastolic dysfunction.  Paracentesis to be performed by IR, unable to identify pocket on floor.  Added lipase and amylase to para labs to search for possible extravasation of pancreatic pseudocyst fluids into ascites.  Hepatitis panel negative and wife confirmed longstanding  alcohol intake, likely contributing to cirrhosis. Diuresing ongoing. IR drained 5L of fluid. SBP ruled out. AES consulted and reviewed Psuedocyst/etc, no procedure at this time. Patient will need continued diureses as outpatient. Alcohol abuse was main cause of cirrhosis, patient repetitively underscored his alcohol intake- wife clarified.       * No surgery found *          Consults:   Consults (From admission, onward)        Status Ordering Provider     Inpatient consult to Advanced Endoscopy Service (AES)  Once     Provider:  (Not yet assigned)    Completed FLORIAN FREEMAN A     Inpatient consult to Interventional Radiology  Once     Provider:  (Not yet assigned)    Completed FLORIAN FREEMAN A     Inpatient consult to PICC team (NIAS)  Once     Provider:  (Not yet assigned)    Completed SUNIL COFFMAN     Inpatient consult to Registered Dietitian/Nutritionist  Once     Provider:  (Not yet assigned)    Completed TIERRA DAVIS        .    Final Active Diagnoses:    Diagnosis Date Noted POA    PRINCIPAL PROBLEM:  Cirrhosis of liver with ascites [K74.60, R18.8] 01/28/2019 Yes    Chronic combined systolic and diastolic heart failure [I50.42] 01/29/2019 Yes    Nutrition disorder [E63.9] 01/29/2019 Yes     Chronic    Lymphedema of both lower extremities [I89.0] 01/29/2019 Yes    Hemoglobin drop [R71.0] 01/29/2019 No    Other ascites [R18.8] 01/28/2019 Yes    Anasarca [R60.1] 01/28/2019 Yes    Diverticulosis [K57.90] 01/28/2019 Yes    Chronic pancreatitis [K86.1] 01/28/2019 Yes    Pseudocyst of pancreas [K86.3] 01/28/2019 Yes    Essential hypertension [I10] 01/28/2019 Yes    Diabetic polyneuropathy associated with type 2 diabetes mellitus [E11.42] 09/02/2015 Yes    Sleep apnea [G47.30] 08/26/2015 Yes    Morbid obesity with BMI of 50.0-59.9, adult [E66.01, Z68.43] 05/08/2015 Not Applicable    Coronary artery disease due to calcified coronary lesion [I25.10, I25.84] 05/08/2015 Yes    HLD  (hyperlipidemia) [E78.5] 05/08/2015 Yes    Type 2 diabetes mellitus, with long-term current use of insulin [E11.9, Z79.4] 05/08/2015 Not Applicable      Problems Resolved During this Admission:      Discharged Condition: fair    Disposition: Home or Self Care    Follow Up:  Follow-up Information     [facility] On 2/4/2019.    Why:  8:00am  Contact information:  [address]  [facility] [address]  [phone]             Samir Mahmood MD.    Specialty:  Internal Medicine  Contact information:  [address]  [facility] [address]  [phone]             Mando larry - Hepatology.    Specialty:  Hepatology  Contact information:  [address]  [facility] [id number] [phone]  Additional information:  1st Floor - [facility], [facility]               Future Appointments   Date Time Provider Department Center   2/4/2019  8:00 AM Alfonso Mahmood MD [facility]in [facility]       Patient Instructions:      Ambulatory referral to Outpatient Case Management   Referral Priority: Routine Referral Type: Consultation   Referral Reason: Specialty Services Required   Number of Visits Requested: 1     Medications:  Reconciled Home Medications:      Medication List      START taking these medications    ferrous sulfate 325 (65 FE) MG EC tablet  Take 1 tablet (325 mg total) by mouth once daily.     furosemide 40 MG tablet  Commonly known as:  LASIX  Take 1 tablet (40 mg total) by mouth once daily.  Start taking on:  1/31/2019     metoprolol succinate 100 MG 24 hr tablet  Commonly known as:  TOPROL-XL  Take 1 tablet (100 mg total) by mouth once daily.  Start taking on:  1/31/2019     spironolactone 100 MG tablet  Commonly known as:  ALDACTONE  Take 1 tablet (100 mg total) by mouth once daily.  Start taking on:  1/31/2019     vitamin D 1000 units Tab  Commonly known as:  VITAMIN D3  Take 1 tablet (1,000 Units total) by mouth once  daily.  Start taking on:  1/31/2019        CHANGE how you take these medications    atorvastatin 40 MG tablet  Commonly known as:  LIPITOR  Take 1 tablet (40 mg total) by mouth once daily.  Start taking on:  1/31/2019  What changed:    · medication strength  · how much to take        CONTINUE taking these medications    aspirin 325 MG tablet  Take 325 mg by mouth once daily.     CREON 36,000-114,000- 180,000 unit Cpdr  Generic drug:  lipase-protease-amylase  Take 1 capsule by mouth 3 (three) times daily.     cyanocobalamin (vitamin B-12) 500 mcg Subl  Place 1 tablet under the tongue every Monday.     ezetimibe 10 mg tablet  Commonly known as:  ZETIA  Take 10 mg by mouth once daily.     insulin aspart U-100 100 unit/mL injection  Commonly known as:  NOVOLOG  Inject 4 Units into the skin 3 (three) times daily before meals.     LEVEMIR FLEXPEN SUBQ  Inject 12 Units into the skin once daily.     omeprazole 40 MG capsule  Commonly known as:  PriLOSEC  Take 1 capsule (40 mg total) by mouth once daily.     ONETOUCH ULTRA TEST Strp  Generic drug:  blood sugar diagnostic  4 (four) times daily. Use to test blood sugar four times a day.     pediatric multivit-iron-min Chew  Take 1 tablet by mouth 2 (two) times daily.     ramipril 2.5 MG capsule  Commonly known as:  ALTACE  Take 2.5 mg by mouth once daily.        STOP taking these medications    clopidogrel 75 mg tablet  Commonly known as:  PLAVIX     doxycycline 100 MG capsule  Commonly known as:  MONODOX     glimepiride 4 MG tablet  Commonly known as:  AMARYL     metoprolol tartrate 50 MG tablet  Commonly known as:  LOPRESSOR     ondansetron 8 MG Tbdl  Commonly known as:  ZOFRAN-ODT     sulfamethoxazole-trimethoprim 800-160mg 800-160 mg Tab  Commonly known as:  BACTRIM DS            Significant Diagnostic Studies:   Recent Results (from the past 196 hour(s))   CBC W/ AUTO DIFFERENTIAL    Collection Time: 01/28/19  8:47 AM   Result Value Ref Range    WBC 11.62 3.90 - 12.70  K/uL    RBC 3.77 (L) 4.60 - 6.20 M/uL    Hemoglobin 11.1 (L) 14.0 - 18.0 g/dL    Hematocrit 35.0 (L) 40.0 - 54.0 %    MCV 93 82 - 98 fL    MCH 29.4 27.0 - 31.0 pg    MCHC 31.7 (L) 32.0 - 36.0 g/dL    RDW 14.6 (H) 11.5 - 14.5 %    Platelets 366 (H) 150 - 350 K/uL    MPV 9.3 9.2 - 12.9 fL    Immature Granulocytes 0.3 0.0 - 0.5 %    Gran # (ANC) 8.8 (H) 1.8 - 7.7 K/uL    Immature Grans (Abs) 0.04 0.00 - 0.04 K/uL    Lymph # 1.6 1.0 - 4.8 K/uL    Mono # 1.1 (H) 0.3 - 1.0 K/uL    Eos # 0.0 0.0 - 0.5 K/uL    Baso # 0.05 0.00 - 0.20 K/uL    nRBC 0 0 /100 WBC    Gran% 76.1 (H) 38.0 - 73.0 %    Lymph% 13.8 (L) 18.0 - 48.0 %    Mono% 9.1 4.0 - 15.0 %    Eosinophil% 0.3 0.0 - 8.0 %    Basophil% 0.4 0.0 - 1.9 %    Differential Method Automated    Comp. Metabolic Panel    Collection Time: 01/28/19  8:47 AM   Result Value Ref Range    Sodium 136 136 - 145 mmol/L    Potassium 4.8 3.5 - 5.1 mmol/L    Chloride 108 95 - 110 mmol/L    CO2 22 (L) 23 - 29 mmol/L    Glucose 89 70 - 110 mg/dL    BUN, Bld 18 8 - 23 mg/dL    Creatinine 0.9 0.5 - 1.4 mg/dL    Calcium 7.9 (L) 8.7 - 10.5 mg/dL    Total Protein 6.1 6.0 - 8.4 g/dL    Albumin 1.6 (L) 3.5 - 5.2 g/dL    Total Bilirubin 0.5 0.1 - 1.0 mg/dL    Alkaline Phosphatase 129 55 - 135 U/L    AST 24 10 - 40 U/L    ALT 15 10 - 44 U/L    Anion Gap 6 (L) 8 - 16 mmol/L    eGFR if African American >60.0 >60 mL/min/1.73 m^2    eGFR if non African American >60.0 >60 mL/min/1.73 m^2   Lipase    Collection Time: 01/28/19  8:47 AM   Result Value Ref Range    Lipase 4 4 - 60 U/L   ISTAT PROCEDURE    Collection Time: 01/28/19  8:55 AM   Result Value Ref Range    POC Glucose 88 70 - 110 mg/dL    POC BUN 21 6 - 30 mg/dL    POC Creatinine 1.0 0.5 - 1.4 mg/dL    POC Sodium 139 136 - 145 mmol/L    POC Potassium 4.7 3.5 - 5.1 mmol/L    POC Chloride 105 95 - 110 mmol/L    POC TCO2 (MEASURED) 24 23 - 29 mmol/L    POC Ionized Calcium 1.04 (L) 1.06 - 1.42 mmol/L    POC Hematocrit 32 (L) 36 - 54 %PCV    Sample SAVANNAH     Protime-INR    Collection Time: 01/28/19  9:40 AM   Result Value Ref Range    Prothrombin Time 11.2 9.0 - 12.5 sec    INR 1.1 0.8 - 1.2   Brain natriuretic peptide    Collection Time: 01/28/19  9:40 AM   Result Value Ref Range    BNP 89 0 - 99 pg/mL   Urinalysis, Reflex to Urine Culture Urine, Clean Catch    Collection Time: 01/28/19 11:09 AM   Result Value Ref Range    Specimen UA Urine, Clean Catch     Color, UA Yellow Yellow, Straw, Janay    Appearance, UA Clear Clear    pH, UA 5.0 5.0 - 8.0    Specific Gravity, UA 1.020 1.005 - 1.030    Protein, UA Negative Negative    Glucose, UA Negative Negative    Ketones, UA Negative Negative    Bilirubin (UA) Negative Negative    Occult Blood UA Negative Negative    Nitrite, UA Negative Negative    Leukocytes, UA Negative Negative   Hemoglobin A1c    Collection Time: 01/28/19  1:49 PM   Result Value Ref Range    Hemoglobin A1C 6.9 (H) 4.0 - 5.6 %    Estimated Avg Glucose 151 (H) 68 - 131 mg/dL   PT/INR    Collection Time: 01/28/19  1:49 PM   Result Value Ref Range    Prothrombin Time 11.2 9.0 - 12.5 sec    INR 1.1 0.8 - 1.2   Transthoracic echo (TTE) complete    Collection Time: 01/28/19  3:50 PM   Result Value Ref Range    Ascending aorta 3.14 cm    STJ 3.56 cm    AV mean gradient 5.08 mmHg    Ao peak anthony 1.52 m/s    Ao VTI 28.29 cm    IVS 1.05 0.6 - 1.1 cm    LA size 3.53 cm    Left Atrium Major Axis 5.59 cm    Left Atrium Minor Axis 5.46 cm    LVIDD 5.20 3.5 - 6.0 cm    LVIDS 3.75 2.1 - 4.0 cm    LVOT diameter 2.37 cm    LVOT peak VTI 16.34 cm    PW 1.00 0.6 - 1.1 cm    MV Peak A Anthony 1.01 m/s    E wave decelartion time 267.53 msec    MV Peak E Anthony 0.78 m/s    RA Major Axis 5.83 cm    RA Width 3.22 cm    RVDD 2.91 cm    Sinus 4.04 cm    TAPSE 1.59 cm    TR Max Anthony 2.88 m/s    TDI LATERAL 0.16     TDI SEPTAL 0.07     LA WIDTH 4.22 cm    LV Diastolic Volume 129.79 mL    LV Systolic Volume 60.00 mL    LVOT peak anthony [id number] m/s    LV LATERAL E/E' RATIO 4.88      LV SEPTAL E/E' RATIO 11.14     FS 28 %    LA volume 69.95 cm3    LV mass 200.67 g    Left Ventricle Relative Wall Thickness 0.38 cm    AV valve area 2.55 cm2    AV Velocity Ratio 0.55     AV index (prosthetic) 0.58     E/A ratio 0.77     Mean e' 0.12     LVOT area 4.41 cm2    LVOT stroke volume 72.05 cm3    AV peak gradient 9.24 mmHg    E/E' ratio 6.78     LV Systolic Volume Index 24.0 mL/m2    LV Diastolic Volume Index 51.92 mL/m2    LA Volume Index 28.0 mL/m2    LV Mass Index 80.3 g/m2    Triscuspid Valve Regurgitation Peak Gradient 33.18 mmHg    BSA 2.66 m2   Hepatitis panel, acute    Collection Time: 01/28/19  6:17 PM   Result Value Ref Range    Hepatitis B Surface Ag Negative     Hep B C IgM Negative     Hep A IgM Negative     Hepatitis C Ab Negative    Hepatitis C antibody    Collection Time: 01/28/19  6:17 PM   Result Value Ref Range    Hepatitis C Ab Negative    Lipid panel    Collection Time: 01/28/19  6:17 PM   Result Value Ref Range    Cholesterol 85 (L) 120 - 199 mg/dL    Triglycerides 56 30 - 150 mg/dL    HDL 31 (L) 40 - 75 mg/dL    LDL Cholesterol 42.8 (L) 63.0 - 159.0 mg/dL    HDL/Chol Ratio 36.5 20.0 - 50.0 %    Total Cholesterol/HDL Ratio 2.7 2.0 - 5.0    Non-HDL Cholesterol 54 mg/dL   TSH    Collection Time: 01/28/19  6:17 PM   Result Value Ref Range    TSH 2.211 0.400 - 4.000 uIU/mL   T4, free    Collection Time: 01/28/19  6:17 PM   Result Value Ref Range    Free T4 0.69 (L) 0.71 - 1.51 ng/dL   Vitamin D    Collection Time: 01/28/19  6:17 PM   Result Value Ref Range    Vit D, 25-Hydroxy 22 (L) 30 - 96 ng/mL   Anti-smooth muscle antibody    Collection Time: 01/28/19  6:17 PM   Result Value Ref Range    Smooth Muscle Ab Positive 1:40 (A) Negative   HIV 1/2 Ag/Ab (4th Gen)    Collection Time: 01/28/19  6:17 PM   Result Value Ref Range    HIV 1/2 Ag/Ab Negative Negative   Procalcitonin    Collection Time: 01/28/19  6:17 PM   Result Value Ref Range    Procalcitonin 0.28 (H) <0.25 ng/mL   C-reactive  protein    Collection Time: 01/28/19  6:17 PM   Result Value Ref Range    CRP 71.5 (H) 0.0 - 8.2 mg/L   POCT glucose    Collection Time: 01/28/19  8:33 PM   Result Value Ref Range    POCT Glucose 210 (H) 70 - 110 mg/dL   POCT glucose    Collection Time: 01/29/19  6:00 AM   Result Value Ref Range    POCT Glucose 195 (H) 70 - 110 mg/dL   Comprehensive Metabolic Panel (CMP)    Collection Time: 01/29/19  7:32 AM   Result Value Ref Range    Sodium 135 (L) 136 - 145 mmol/L    Potassium 4.4 3.5 - 5.1 mmol/L    Chloride 106 95 - 110 mmol/L    CO2 24 23 - 29 mmol/L    Glucose 196 (H) 70 - 110 mg/dL    BUN, Bld 19 8 - 23 mg/dL    Creatinine 0.9 0.5 - 1.4 mg/dL    Calcium 7.5 (L) 8.7 - 10.5 mg/dL    Total Protein 4.8 (L) 6.0 - 8.4 g/dL    Albumin 1.5 (L) 3.5 - 5.2 g/dL    Total Bilirubin 0.4 0.1 - 1.0 mg/dL    Alkaline Phosphatase 101 55 - 135 U/L    AST 14 10 - 40 U/L    ALT 11 10 - 44 U/L    Anion Gap 5 (L) 8 - 16 mmol/L    eGFR if African American >60.0 >60 mL/min/1.73 m^2    eGFR if non African American >60.0 >60 mL/min/1.73 m^2   Magnesium    Collection Time: 01/29/19  7:32 AM   Result Value Ref Range    Magnesium 1.6 1.6 - 2.6 mg/dL   Phosphorus    Collection Time: 01/29/19  7:32 AM   Result Value Ref Range    Phosphorus 3.4 2.7 - 4.5 mg/dL   CBC auto differential    Collection Time: 01/29/19  7:32 AM   Result Value Ref Range    WBC 5.71 3.90 - 12.70 K/uL    RBC 2.96 (L) 4.60 - 6.20 M/uL    Hemoglobin 8.5 (L) 14.0 - 18.0 g/dL    Hematocrit 27.1 (L) 40.0 - 54.0 %    MCV 92 82 - 98 fL    MCH 28.7 27.0 - 31.0 pg    MCHC 31.4 (L) 32.0 - 36.0 g/dL    RDW 14.6 (H) 11.5 - 14.5 %    Platelets 239 150 - 350 K/uL    MPV 9.4 9.2 - 12.9 fL    Immature Granulocytes 0.4 0.0 - 0.5 %    Gran # (ANC) 3.9 1.8 - 7.7 K/uL    Immature Grans (Abs) 0.02 0.00 - 0.04 K/uL    Lymph # 1.1 1.0 - 4.8 K/uL    Mono # 0.6 0.3 - 1.0 K/uL    Eos # 0.0 0.0 - 0.5 K/uL    Baso # 0.03 0.00 - 0.20 K/uL    nRBC 0 0 /100 WBC    Gran% 67.4 38.0 - 73.0 %     Lymph% 20.0 18.0 - 48.0 %    Mono% 11.2 4.0 - 15.0 %    Eosinophil% 0.5 0.0 - 8.0 %    Basophil% 0.5 0.0 - 1.9 %    Differential Method Automated    Ferritin    Collection Time: 01/29/19  7:32 AM   Result Value Ref Range    Ferritin 285 20.0 - 300.0 ng/mL   Iron and TIBC    Collection Time: 01/29/19  7:32 AM   Result Value Ref Range    Iron 24 (L) 45 - 160 ug/dL    Transferrin 37 (L) 200 - 375 mg/dL    TIBC 55 (L) 250 - 450 ug/dL    Saturated Iron 44 20 - 50 %   Aerobic culture    Collection Time: 01/29/19 10:47 AM   Result Value Ref Range    Aerobic Bacterial Culture No growth    Culture, Anaerobic    Collection Time: 01/29/19 10:47 AM   Result Value Ref Range    Anaerobic Culture Culture in progress    Gram stain    Collection Time: 01/29/19 10:47 AM   Result Value Ref Range    Gram Stain Result Cytospin indicates:     Gram Stain Result Few WBC's     Gram Stain Result No organisms seen    Albumin, Peritoneal, Pleural Fluid or SUZI Drainage, In-House Ascites    Collection Time: 01/29/19 10:47 AM   Result Value Ref Range    Body Fluid Source, Albumin Ascites     Body Fluid Albumin 0.7 See text g/dL   Protein, Peritoneal, Pleural Fluid or SUZI Drainage, In-House Ascites    Collection Time: 01/29/19 10:47 AM   Result Value Ref Range    Body Fluid Source, Total Protein Ascites     Body Fluid, Protein 1.9 Not established g/dL   LDH, Peritoneal, Pleural Fluid or SUZI Drainage, In-House Ascites    Collection Time: 01/29/19 10:47 AM   Result Value Ref Range    Body Fluid Source, LDH Ascites     LD, Fluid 69 Not established U/L   WBC & Diff,Body Fluid Ascites    Collection Time: 01/29/19 10:47 AM   Result Value Ref Range    Body Fluid Type Ascites     Fluid Appearance Clear     Fluid Color Yellow     WBC, Body Fluid 309 /cu mm    Segs, Fluid 44 %    Lymphs, Fluid 45 %    Monocytes/Macrophages, Fluid 8 %    Mesothelial cells, Fluid 3 %   Amylase, Peritoneal, Pleural Fluid or SUZI Drainage, In-House Ascites    Collection Time:  01/29/19 10:47 AM   Result Value Ref Range    Body Fluid Source Amylase Ascites     Amylase, Fluid 17 Not established U/L   Lipase, Body Fluid Ascites    Collection Time: 01/29/19 10:47 AM   Result Value Ref Range    Body Fluid Source, Lipase Ascites fluid     Lipase, Body Fluid 3 U/L   Pathologist Review of Laboratory Test    Collection Time: 01/29/19 10:47 AM   Result Value Ref Range    Pathologist Review, Body Fluid REVIEWED    POCT glucose    Collection Time: 01/29/19 12:20 PM   Result Value Ref Range    POCT Glucose 177 (H) 70 - 110 mg/dL   CBC auto differential    Collection Time: 01/29/19  1:42 PM   Result Value Ref Range    WBC 5.91 3.90 - 12.70 K/uL    RBC 3.08 (L) 4.60 - 6.20 M/uL    Hemoglobin 9.0 (L) 14.0 - 18.0 g/dL    Hematocrit 28.0 (L) 40.0 - 54.0 %    MCV 91 82 - 98 fL    MCH 29.2 27.0 - 31.0 pg    MCHC 32.1 32.0 - 36.0 g/dL    RDW 14.6 (H) 11.5 - 14.5 %    Platelets 247 150 - 350 K/uL    MPV 9.1 (L) 9.2 - 12.9 fL    Immature Granulocytes 0.5 0.0 - 0.5 %    Gran # (ANC) 3.9 1.8 - 7.7 K/uL    Immature Grans (Abs) 0.03 0.00 - 0.04 K/uL    Lymph # 1.2 1.0 - 4.8 K/uL    Mono # 0.7 0.3 - 1.0 K/uL    Eos # 0.1 0.0 - 0.5 K/uL    Baso # 0.02 0.00 - 0.20 K/uL    nRBC 0 0 /100 WBC    Gran% 66.7 38.0 - 73.0 %    Lymph% 19.5 18.0 - 48.0 %    Mono% 12.2 4.0 - 15.0 %    Eosinophil% 0.8 0.0 - 8.0 %    Basophil% 0.3 0.0 - 1.9 %    Differential Method Automated    POCT glucose    Collection Time: 01/29/19  5:21 PM   Result Value Ref Range    POCT Glucose 210 (H) 70 - 110 mg/dL   POCT glucose    Collection Time: 01/29/19  6:29 PM   Result Value Ref Range    POCT Glucose 71 70 - 110 mg/dL   POCT glucose    Collection Time: 01/29/19 10:58 PM   Result Value Ref Range    POCT Glucose 245 (H) 70 - 110 mg/dL   POCT glucose    Collection Time: 01/30/19  5:19 AM   Result Value Ref Range    POCT Glucose 179 (H) 70 - 110 mg/dL   Comprehensive Metabolic Panel (CMP)    Collection Time: 01/30/19  6:45 AM   Result Value Ref Range     Sodium 136 136 - 145 mmol/L    Potassium 4.1 3.5 - 5.1 mmol/L    Chloride 106 95 - 110 mmol/L    CO2 25 23 - 29 mmol/L    Glucose 176 (H) 70 - 110 mg/dL    BUN, Bld 16 8 - 23 mg/dL    Creatinine 1.0 0.5 - 1.4 mg/dL    Calcium 7.6 (L) 8.7 - 10.5 mg/dL    Total Protein 4.6 (L) 6.0 - 8.4 g/dL    Albumin 2.0 (L) 3.5 - 5.2 g/dL    Total Bilirubin 0.4 0.1 - 1.0 mg/dL    Alkaline Phosphatase 76 55 - 135 U/L    AST 13 10 - 40 U/L    ALT 9 (L) 10 - 44 U/L    Anion Gap 5 (L) 8 - 16 mmol/L    eGFR if African American >60.0 >60 mL/min/1.73 m^2    eGFR if non African American >60.0 >60 mL/min/1.73 m^2   Magnesium    Collection Time: 01/30/19  6:45 AM   Result Value Ref Range    Magnesium 1.5 (L) 1.6 - 2.6 mg/dL   Phosphorus    Collection Time: 01/30/19  6:45 AM   Result Value Ref Range    Phosphorus 2.6 (L) 2.7 - 4.5 mg/dL   CBC auto differential    Collection Time: 01/30/19  6:45 AM   Result Value Ref Range    WBC 3.84 (L) 3.90 - 12.70 K/uL    RBC 2.74 (L) 4.60 - 6.20 M/uL    Hemoglobin 8.2 (L) 14.0 - 18.0 g/dL    Hematocrit 25.2 (L) 40.0 - 54.0 %    MCV 92 82 - 98 fL    MCH 29.9 27.0 - 31.0 pg    MCHC 32.5 32.0 - 36.0 g/dL    RDW 14.3 11.5 - 14.5 %    Platelets 201 150 - 350 K/uL    MPV 9.1 (L) 9.2 - 12.9 fL    Immature Granulocytes 0.3 0.0 - 0.5 %    Gran # (ANC) 2.4 1.8 - 7.7 K/uL    Immature Grans (Abs) 0.01 0.00 - 0.04 K/uL    Lymph # 0.9 (L) 1.0 - 4.8 K/uL    Mono # 0.4 0.3 - 1.0 K/uL    Eos # 0.0 0.0 - 0.5 K/uL    Baso # 0.01 0.00 - 0.20 K/uL    nRBC 0 0 /100 WBC    Gran% 63.1 38.0 - 73.0 %    Lymph% 24.0 18.0 - 48.0 %    Mono% 11.5 4.0 - 15.0 %    Eosinophil% 0.8 0.0 - 8.0 %    Basophil% 0.3 0.0 - 1.9 %    Differential Method Automated    POCT glucose    Collection Time: 01/30/19  8:03 AM   Result Value Ref Range    POCT Glucose 184 (H) 70 - 110 mg/dL   POCT glucose    Collection Time: 01/30/19 11:35 AM   Result Value Ref Range    POCT Glucose 302 (H) 70 - 110 mg/dL     Microbiology Results (last 7 days)      Procedure Component Value Units Date/Time    Culture, Anaerobic [[id number]] Collected:  01/29/19 1047    Order Status:  Completed Specimen:  Ascites Updated:  01/30/19 0739     Anaerobic Culture Culture in progress    Aerobic culture [[id number]] Collected:  01/29/19 1047    Order Status:  Completed Specimen:  Ascites Updated:  01/30/19 0722     Aerobic Bacterial Culture No growth    Gram stain [[id number]] Collected:  01/29/19 1047    Order Status:  Completed Specimen:  Ascites Updated:  01/29/19 1922     Gram Stain Result Cytospin indicates:      Few WBC's      No organisms seen    Blood culture [[id number]]     Order Status:  Canceled Specimen:  Blood     Blood culture [[id number]]     Order Status:  Canceled Specimen:  Blood         Imaging Results          CT Abdomen Pelvis With Contrast (Final result)     Abnormal  Result time 01/28/19 10:51:09    Final result by Isrrael Newman MD (01/28/19 10:51:09)                 Impression:      Liver has a cirrhotic morphology with no focal lesions.  Significant interval increase in ascites when compared to prior exam which may account for patient's abdominal distension.    Hypodense air-fluid collection along the body of the pancreas which is slightly smaller when compared to prior CT.  Findings may relate to pancreatic necrosis with pancreatic pseudocysts with infected pseudocyst not excluded.  Mass effect with narrowing of the portal confluence in the region although the vessels appear patent.    Diffuse anasarca.    Sleeve gastrectomy.    Extensive atherosclerosis.    This report was flagged in Epic as abnormal.      Electronically signed by: Isrrael Newman MD  Date:    01/28/2019  Time:    10:51             Narrative:    EXAMINATION:  CT ABDOMEN PELVIS WITH CONTRAST    CLINICAL HISTORY:  Abdominal distension;    TECHNIQUE:  Low dose axial images, sagittal and coronal reformations were obtained from the lung bases to the pubic symphysis following the IV administration of  100 mL of Omnipaque 350 .  Oral contrast was not given.    COMPARISON:  Outside examination of 11/16/2018    FINDINGS:  Heart is not enlarged.  There is extensive coronary atherosclerosis.  Visualized portions of the lung bases demonstrate no acute abnormalities.    There is a cirrhotic morphology to the liver with a nodular contour.  No focal liver lesions are noted on single phase of imaging.  No significant intrahepatic biliary ductal dilatation.  Gallbladder is mildly distended but shows no evidence of radiopaque Stones or wall thickening.  There are postoperative changes of sleeve gastrectomy.  There is a 9.3 x 3.9 x 3.4 cm hypodense air-fluid collection centered around the body of the pancreas.  The portal vein is narrowed near the confluence of the splenic vein and SMV although these vessels appear patent.    Spleen is upper normal in size.    Small and large bowel show no evidence of obstruction.  There is no free air.  Appendix not definitely visualized.  Tubular air-filled structure in the right lower abdomen is possibly the appendix and appears within normal limits.  There is diverticulosis.    Kidneys have a lobular contour with vascular calcifications.  No evidence of hydronephrosis.  Kidneys concentrate excrete contrast on delayed images.  Urinary bladder is nondistended but grossly unremarkable.    Significant interval increase in ascites when compared to prior exam.  Ascites measures slightly higher in attenuation than simple fluid although this may be in part due to artifact from patient's thick body wall and anasarca.    Aorta is normal in caliber with severe atherosclerosis.  There is diffuse anasarca.  Bones demonstrate no acute abnormalities.                               X-Ray Chest PA And Lateral (Final result)  Result time 01/28/19 09:34:06    Final result by Ab Smith III, MD (01/28/19 09:34:06)                 Impression:      See above    No acute process seen.      Electronically  signed by: Ab Smith MD  Date:    01/28/2019  Time:    09:34             Narrative:    EXAMINATION:  XR CHEST PA AND LATERAL    CLINICAL HISTORY:  Shortness of breath    FINDINGS:  There is postoperative change.  Heart size is normal.  Lungs are clear.                                  Pending Diagnostic Studies:     Procedure Component Value Units Date/Time    IR Paracentesis with Imaging [[id number]] Resulted:  01/29/19 1011    Order Status:  Sent Lab Status:  In process Updated:  01/29/19 1132        Indwelling Lines/Drains at time of discharge:   Lines/Drains/Airways          None            1. [facility]- then hepatology and new PCP.  2. Patient will need to have expectations managed and educated again of cirrhosis and its long-term effects- much time was spent but patient continually showed he was not understanding/acceptive of process.  3. Newly Iron and Vit D def, overall malnutrition despite obesity  4. Added BB, Lasix, and Spironolactone- will need adjusting.  5. BP closely monitored with new medications    Sent to PCP      Time spent on the discharge of patient: 60 minutes  Patient was seen and examined on the date of discharge and determined to be suitable for discharge.                     Jian Lambert MD  Department of Hospital Medicine  [facility]

## 2019-02-01 LAB — BACTERIA SPEC AEROBE CULT: NO GROWTH

## 2019-02-04 ENCOUNTER — OFFICE VISIT (OUTPATIENT)
Dept: PRIMARY CARE CLINIC | Facility: CLINIC | Age: 64
End: 2019-02-04
Payer: COMMERCIAL

## 2019-02-04 VITALS
WEIGHT: 315 LBS | TEMPERATURE: 98 F | DIASTOLIC BLOOD PRESSURE: 84 MMHG | OXYGEN SATURATION: 99 % | HEIGHT: 67 IN | BODY MASS INDEX: 49.44 KG/M2 | HEART RATE: 80 BPM | SYSTOLIC BLOOD PRESSURE: 126 MMHG

## 2019-02-04 DIAGNOSIS — E78.2 MIXED HYPERLIPIDEMIA: ICD-10-CM

## 2019-02-04 DIAGNOSIS — K86.0 ALCOHOL-INDUCED CHRONIC PANCREATITIS: ICD-10-CM

## 2019-02-04 DIAGNOSIS — D64.9 NORMOCYTIC ANEMIA: ICD-10-CM

## 2019-02-04 DIAGNOSIS — G47.33 OBSTRUCTIVE SLEEP APNEA SYNDROME: ICD-10-CM

## 2019-02-04 DIAGNOSIS — K86.3 PSEUDOCYST OF PANCREAS: ICD-10-CM

## 2019-02-04 DIAGNOSIS — E11.42 DIABETIC POLYNEUROPATHY ASSOCIATED WITH TYPE 2 DIABETES MELLITUS: ICD-10-CM

## 2019-02-04 DIAGNOSIS — R60.1 ANASARCA: ICD-10-CM

## 2019-02-04 DIAGNOSIS — I89.0 LYMPHEDEMA OF BOTH LOWER EXTREMITIES: ICD-10-CM

## 2019-02-04 DIAGNOSIS — K70.31 ALCOHOLIC CIRRHOSIS OF LIVER WITH ASCITES: ICD-10-CM

## 2019-02-04 DIAGNOSIS — I25.84 CORONARY ARTERY DISEASE DUE TO CALCIFIED CORONARY LESION: ICD-10-CM

## 2019-02-04 DIAGNOSIS — E66.01 MORBID OBESITY WITH BMI OF 50.0-59.9, ADULT: ICD-10-CM

## 2019-02-04 DIAGNOSIS — Z98.84 STATUS POST BARIATRIC SURGERY: ICD-10-CM

## 2019-02-04 DIAGNOSIS — I10 ESSENTIAL HYPERTENSION: ICD-10-CM

## 2019-02-04 DIAGNOSIS — E88.09 HYPOALBUMINEMIA: ICD-10-CM

## 2019-02-04 DIAGNOSIS — I25.10 CORONARY ARTERY DISEASE DUE TO CALCIFIED CORONARY LESION: ICD-10-CM

## 2019-02-04 DIAGNOSIS — E11.42 DM TYPE 2 WITH DIABETIC PERIPHERAL NEUROPATHY: ICD-10-CM

## 2019-02-04 DIAGNOSIS — I50.42 CHRONIC COMBINED SYSTOLIC AND DIASTOLIC HEART FAILURE: Primary | ICD-10-CM

## 2019-02-04 PROBLEM — K57.90 DIVERTICULOSIS: Status: RESOLVED | Noted: 2019-01-28 | Resolved: 2019-02-04

## 2019-02-04 PROBLEM — B35.1 ONYCHOMYCOSIS OF MULTIPLE TOENAILS WITH TYPE 2 DIABETES MELLITUS AND PERIPHERAL NEUROPATHY: Status: RESOLVED | Noted: 2017-06-20 | Resolved: 2019-02-04

## 2019-02-04 PROBLEM — Z87.2 HEALED ULCER OF LEFT FOOT ON EXAMINATION: Status: RESOLVED | Noted: 2017-06-20 | Resolved: 2019-02-04

## 2019-02-04 PROBLEM — E11.69 ONYCHOMYCOSIS OF MULTIPLE TOENAILS WITH TYPE 2 DIABETES MELLITUS AND PERIPHERAL NEUROPATHY: Status: RESOLVED | Noted: 2017-06-20 | Resolved: 2019-02-04

## 2019-02-04 PROCEDURE — 99499 UNLISTED E&M SERVICE: CPT | Mod: S$GLB,,, | Performed by: INTERNAL MEDICINE

## 2019-02-04 PROCEDURE — 99496 TRANSJ CARE MGMT HIGH F2F 7D: CPT | Mod: S$GLB,,, | Performed by: INTERNAL MEDICINE

## 2019-02-04 PROCEDURE — 99999 PR PBB SHADOW E&M-EST. PATIENT-LVL V: CPT | Mod: PBBFAC,,, | Performed by: INTERNAL MEDICINE

## 2019-02-04 PROCEDURE — 99499 RISK ADDL DX/OHS AUDIT: ICD-10-PCS | Mod: S$GLB,,, | Performed by: INTERNAL MEDICINE

## 2019-02-04 PROCEDURE — 99999 PR PBB SHADOW E&M-EST. PATIENT-LVL V: ICD-10-PCS | Mod: PBBFAC,,, | Performed by: INTERNAL MEDICINE

## 2019-02-04 PROCEDURE — 99496 TRANSITIONAL CARE MANAGE SERVICE 7 DAY DISCHARGE: ICD-10-PCS | Mod: S$GLB,,, | Performed by: INTERNAL MEDICINE

## 2019-02-04 RX ORDER — FUROSEMIDE 40 MG/1
40 TABLET ORAL DAILY
Qty: 90 TABLET | Refills: 3 | Status: ON HOLD | OUTPATIENT
Start: 2019-02-04 | End: 2019-05-29 | Stop reason: HOSPADM

## 2019-02-04 RX ORDER — SPIRONOLACTONE 100 MG/1
100 TABLET, FILM COATED ORAL DAILY
Qty: 90 TABLET | Refills: 3 | Status: SHIPPED | OUTPATIENT
Start: 2019-02-04 | End: 2019-04-02

## 2019-02-04 RX ORDER — ATORVASTATIN CALCIUM 40 MG/1
40 TABLET, FILM COATED ORAL DAILY
Qty: 90 TABLET | Refills: 3 | Status: SHIPPED | OUTPATIENT
Start: 2019-02-04 | End: 2019-08-26

## 2019-02-04 RX ORDER — RAMIPRIL 2.5 MG/1
2.5 CAPSULE ORAL DAILY
Qty: 90 CAPSULE | Refills: 3 | Status: SHIPPED | OUTPATIENT
Start: 2019-02-04 | End: 2019-02-07

## 2019-02-04 RX ORDER — METOPROLOL SUCCINATE 100 MG/1
100 TABLET, EXTENDED RELEASE ORAL DAILY
Qty: 90 TABLET | Refills: 3 | Status: ON HOLD | OUTPATIENT
Start: 2019-02-04 | End: 2019-05-29 | Stop reason: SDUPTHER

## 2019-02-04 NOTE — PROGRESS NOTES
Cardiology Clinic Note  Reason for Visit: new cardiomyopathy, hospitalization f/u    HPI:     Jian Arrieta is a 63 y.o. M with CAD s/p PCI, HLD, DM2, EtOH cirrhosis, BERHANE, who was referred for cardiomyopathy.    He was recently admitted to [facility] from 1/28/19-1/30/19 for L upper abdominal pain, distention, and bilateral lower extremity edema. While hospitalized, he was diuresed with lasix 20mg IV with good urine output. CT abd/pelv revealed cirrhosis and anasarca, including ascites. TTE showed LVEF 30%. Paracentesis performed with 5L removed without evidence of SBP. Suspect EtOH etiology to cirrhosis, given reported drinking history and prior episodes of pancreatitis due to alcohol intake. He was started on medical therapy for low EF and volume control.    He presents today for post hospitalization follow up. He reports that his edema is chronic and has not worsened since discharge. RA pressure of 3 on echo during admit. His edema has been present and stable for years, until recent hospitalization; now back to his baseline. No orthopnea, PND. No chest pain. No lightheadedness, presyncope, syncope, palpitations. He denies alcohol use since 9/2018.    To note, he was previously followed by Dr Arroyo at  cardiology. He had a total of five stents placed over the last 20y. He had CABG 1.5-2y ago. He was having chest pain provoked by stressful situations prior to interventions. No history of valvular disease, arrhythmias, or heart failure/cardiomyopathy.    LDL 42 on 1/28/19    Medical: CAD s/p PCI (x5) with subsequent CABG x3v (~2017 at ), HLD, DM2, BERHANE on CPAP, R knee OA, Charcot foot, EtOH cirrhosis, normocytic anemia  Surgical: sleeve gastrectomy, knee, perianal cyst removal, CABG  Family: DM, Parkinson's, cancers, stroke  Social: former smoker of 2 PPD x30y (quit 2005)     ROS:    Constitution: Negative for fever or chills.  HENT: Negative for  headaches.  Eyes: Negative for blurred vision.   Cardiovascular:  See above  Pulmonary: Negative for SOB. Negative for cough.   Gastrointestinal: Negative for nausea/vomiting.   : Negative for dysuria.   Skin: Negative for rashes.  Neurological: Negative for focal weakness.  Psychological: Negative for depression.  PMH:     Past Medical History:   Diagnosis Date    Alcohol abuse     Alcoholic cirrhosis of liver with ascites     1-28-19 Liver has a cirrhotic morphology with no focal lesions.  Significant interval increase in ascites when compared to prior exam which may account for patient's abdominal distension.  Hypodense air-fluid collection along the body of the pancreas which is slightly smaller when compared to prior CT.  Findings may relate to pancreatic necrosis with pancreatic pseudocysts with infected pseudocyst    Anasarca 1/28/2019    Arthropathy associated with neurological disorder 9/2/2015    Atherosclerosis     Charcot foot due to diabetes mellitus     Chronic combined systolic and diastolic heart failure 01/29/2019 1-28-19 Left VentricleModerate decreased ejection fraction at 30%. Normal left ventricular cavity size. Normal wall thickness observed. Grade I (mild) left ventricular diastolic dysfunction consistent with impaired relaxation. Normal left atrial pressure. Moderate, global hypokinesis(see wall scoring diagram). Right VentricleNormal cavity size, wall thickness and ejection fraction. Wall motion n    Chronic pancreatitis 1/28/2019    Cirrhosis of liver with ascites 1/28/2019    Colon polyps     approx 5 yrs ago    Coronary artery disease due to calcified coronary lesion 05/08/2015    5 stents on ASA      Diabetic polyneuropathy associated with type 2 diabetes mellitus 9/2/2015    Diverticulosis 1/28/2019    DM type 2 with diabetic peripheral neuropathy 2/4/2019    Essential hypertension 1/28/2019    Former smoker 8/26/2015    Healed ulcer of left foot on examination 6/20/2017    Hydrocele     approx 1.5 yrs ago    Hypoalbuminemia  2/4/2019    Lymphedema of both lower extremities 1/29/2019    Mixed hyperlipidemia 5/8/2015    Morbid obesity with BMI of 50.0-59.9, adult 5/8/2015    Onychomycosis of multiple toenails with type 2 diabetes mellitus and peripheral neuropathy 6/20/2017    Perianal cyst     approx 2 yrs ago    Pseudocyst of pancreas 1/28/2019 1-28-19 Liver has a cirrhotic morphology with no focal lesions.  Significant interval increase in ascites when compared to prior exam which may account for patient's abdominal distension.  Hypodense air-fluid collection along the body of the pancreas which is slightly smaller when compared to prior CT.  Findings may relate to pancreatic necrosis with pancreatic pseudocysts with infected pseudocyst    Skin cancer     skin cancer    Sleep apnea 8/26/2015    Status post bariatric surgery 1/11/2016    Type 2 diabetes mellitus, with long-term current use of insulin 5/8/2015     Past Surgical History:   Procedure Laterality Date    ANGIOPLASTY      total x5 stents    CARDIAC SURGERY      COLONOSCOPY N/A 10/6/2015    Performed by Shekhar Richards MD at [facility] ENDO (2ND FLR)    CYST REMOVAL      GASTRECTOMY      GASTRECTOMY-SLEEVE-LAPAROSCOPIC - 54607 N/A 12/22/2015    Performed by Micheal Villavicencio Jr., MD at [facility] OR 2ND FLR    KNEE ARTHROSCOPY      perianal surgery      perianal cyst removed     Allergies:   Review of patient's allergies indicates:  No Known Allergies  Medications:     Current Outpatient Medications on File Prior to Visit   Medication Sig Dispense Refill    aspirin 325 MG tablet Take 325 mg by mouth once daily.      atorvastatin (LIPITOR) 40 MG tablet Take 1 tablet (40 mg total) by mouth once daily. 90 tablet 3    cyanocobalamin, vitamin B-12, 500 mcg Subl Place 1 tablet under the tongue every Monday.       ferrous sulfate 325 (65 FE) MG EC tablet Take 1 tablet (325 mg total) by mouth once daily.  0    furosemide (LASIX) 40 MG tablet Take 1 tablet (40 mg total) by  mouth once daily. 90 tablet 3    insulin aspart U-100 (NOVOLOG) 100 unit/mL injection Inject 4 Units into the skin 3 (three) times daily before meals.      insulin detemir (LEVEMIR FLEXPEN SUBQ) Inject 12 Units into the skin once daily.       lipase-protease-amylase (CREON) 36,000-114,000- 180,000 unit CpDR Take 1 capsule by mouth 3 (three) times daily. 270 capsule 3    metoprolol succinate (TOPROL-XL) 100 MG 24 hr tablet Take 1 tablet (100 mg total) by mouth once daily. 90 tablet 3    ONETOUCH ULTRA TEST Strp 4 (four) times daily. Use to test blood sugar four times a day.  3    ramipril (ALTACE) 2.5 MG capsule Take 1 capsule (2.5 mg total) by mouth once daily. 90 capsule 3    spironolactone (ALDACTONE) 100 MG tablet Take 1 tablet (100 mg total) by mouth once daily. 90 tablet 3    vitamin D (VITAMIN D3) 1000 units Tab Take 1 tablet (1,000 Units total) by mouth once daily.      [DISCONTINUED] atorvastatin (LIPITOR) 40 MG tablet Take 1 tablet (40 mg total) by mouth once daily. 90 tablet 0    [DISCONTINUED] ezetimibe (ZETIA) 10 mg tablet Take 10 mg by mouth once daily.      [DISCONTINUED] furosemide (LASIX) 40 MG tablet Take 1 tablet (40 mg total) by mouth once daily. 90 tablet 0    [DISCONTINUED] lipase-protease-amylase (CREON) 36,000-114,000- 180,000 unit CpDR Take 1 capsule by mouth 3 (three) times daily.       [DISCONTINUED] metoprolol succinate (TOPROL-XL) 100 MG 24 hr tablet Take 1 tablet (100 mg total) by mouth once daily. 90 tablet 0    [DISCONTINUED] omeprazole (PRILOSEC) 40 MG capsule Take 1 capsule (40 mg total) by mouth once daily. 30 capsule 11    [DISCONTINUED] pediatric multivit-iron-min Chew Take 1 tablet by mouth 2 (two) times daily.      [DISCONTINUED] ramipril (ALTACE) 2.5 MG capsule Take 2.5 mg by mouth once daily.      [DISCONTINUED] spironolactone (ALDACTONE) 100 MG tablet Take 1 tablet (100 mg total) by mouth once daily. 90 tablet 0     No current facility-administered  "medications on file prior to visit.      Social History:     Social History     Tobacco Use    Smoking status: Former Smoker     Packs/day: 2.00     Years: 30.00     Pack years: 60.00     Types: Cigarettes     Last attempt to quit: 2005     Years since quittin.0    Smokeless tobacco: Never Used   Substance Use Topics    Alcohol use: Yes     Alcohol/week: 0.0 - 0.6 oz     Family History:     Family History   Problem Relation Age of Onset    Cancer Mother     Cancer Father     Obesity Sister     Parkinsonism Brother     No Known Problems Paternal Grandmother     Cancer Paternal Grandfather     Cancer Brother     Diabetes Maternal Grandmother     Stroke Maternal Grandfather      Physical Exam:   /61 (BP Location: Left arm, Patient Position: Sitting, BP Method: Large (Automatic))   Pulse 82   Ht 5' 7" (1.702 m)   Wt (!) 148.3 kg (327 lb)   SpO2 100%   BMI 51.22 kg/m²      Constitutional: No apparent distress, conversant  HEENT: Sclera anicteric, extraocular movements intact  Neck: No jugular venous distension, no carotid bruits  CV: Regular rate and rhythm, no murmurs rubs or gallops, normal S1/S2  Pulm: Clear to auscultation bilaterally  GI: Abdomen soft, no palpable masses  Extremities: Massive lower extremity edema, warm with palpable pulses  Skin: Multiple areas of bruising on extremities, laceration on dorsal left hand with bleeding  Psych: Alert and oriented to person place location, appropriate affect  Neuro: No focal deficits    Labs:     Blood Tests:  Lab Results   Component Value Date    BNP 89 2019     2019    K 4.1 2019     2019    CO2 25 2019    BUN 16 2019    CREATININE 1.0 2019     (H) 2019    HGBA1C 6.9 (H) 2019    MG 1.5 (L) 2019    AST 13 2019    ALT 9 (L) 2019    ALBUMIN 2.0 (L) 2019    PROT 4.6 (L) 2019    BILITOT 0.4 2019    WBC 3.84 (L) 2019    HGB 8.2 " (L) 2019    HCT 25.2 (L) 2019    HCT 32 (L) 2019    MCV 92 2019     2019    INR 1.1 2019    TSH 2.211 2019       Lab Results   Component Value Date    CHOL 85 (L) 2019    HDL 31 (L) 2019    TRIG 56 2019       Lab Results   Component Value Date    LDLCALC 42.8 (L) 2019       Urine Tests:  Lab Results   Component Value Date    COLORU Yellow 2019    APPEARANCEUA Clear 2019    PHUR 5.0 2019    SPECGRAV 1.020 2019    PROTEINUA Negative 2019    GLUCUA Negative 2019    KETONESU Negative 2019    BILIRUBINUA Negative 2019    OCCULTUA Negative 2019    NITRITE Negative 2019    LEUKOCYTESUR Negative 2019       Imaging:     Echocardiogram  TTE 19  · Technically difficult study - BMI > 50  · Moderately decreased left ventricular systolic function. The estimated ejection fraction is 30%  · Normal right ventricular systolic function.  · Grade I (mild) left ventricular diastolic dysfunction consistent with impaired relaxation.    Stress testing  None    Cath Lab  None    Other  None    EK19  Sinus tachycardia  Otherwise normal ECG    Assessment:     1. Coronary artery disease due to calcified coronary lesion    2. Alcoholic cardiomyopathy    3. Essential hypertension    4. Mixed hyperlipidemia        Plan:     Cardiomyopathy  With history of recurrent pancreatitis, cirrhosis, and cardiomyopathy, alcohol could be a potential etiology to explain all of these. He reports less drinking history than that gathered from the patient's wife during the hospitalization. He has not had alcohol since 2018. No ischemic symptoms at this time, but he is very limited in functional capacity due to generalized weakness and loss of strength.  Will continue toprol 100mg daily. Plan to up-titrate as able at next visit.  Increase ramipril from 2.5mg to 5mg daily.  Instructed to notify me if he has  orthostasis.  Intravascularly appears euvolemic. Continue lasix 40mg daily and spironolactone 100mg daily.    Coronary artery disease due to calcified coronary lesion  No ischemic symptoms. Consider PET stress to evaluate new cardiomyopathy at upcoming visits.  Continue ASA 81mg daily. LDL 42 due to cirrhosis. Not currently on statin.  Continue beta blocker, as above.    Essential hypertension  Controlled on beta blocker, ACEi, and spironolactone    Mixed hyperlipidemia  LDL 42 in 1/2019    Lymphedema  Would use caution with compression, given LVEF of 30%, as pneumatic compression devices may result in symptoms of significant volume overload  OK to use bandage compression for now, as he has been without issue.    Signed:  Samuel Carney MD  Cardiology     2/7/2019 3:56 PM    Follow-up:     Future Appointments   Date Time Provider Department Center   2/7/2019  1:40 PM Aleshia Almonte NP [facility] HEPAT [address]   2/7/2019  2:30 PM Jaime Carney III, MD [facility] CARDIO [facility]   3/18/2019  2:45 PM Samir Mahmood MD [facility] IM [facility] PC[facility]

## 2019-02-04 NOTE — Clinical Note
[facility] Visit (Post Discharge Follow-up) Today: - Our clinic physicians and nurses plan to follow the patient up for any medical issues in the [facility] for 30 days post discharge.Future Appointments:Future Appointments2/7/2019   1:40 PM    Aleshia Almonte NP       [facility] HEPAT     [facility]/2019   2:30 PM    Jaime Carney III, MD  [facility] CARDIO    [address]/18/2019  2:45 PM    Samir Mahmood MD         [facility] IM        [facility] PCW

## 2019-02-04 NOTE — PROGRESS NOTES
[facility]  New Visit Progress Note   Recent Hospital Discharge     PRESENTING HISTORY     Chief Complaint/Reason for Visit:  Follow up Hospital Discharge   Chief Complaint   Patient presents with    Hospital Follow Up     PCP: Samir Mahmood MD    History of Present Illness: Mr. Jian Arrieta is a 63 y.o. male who was recently admitted to the hospital.    Admission Date: 1/28/2019  Hospital Length of Stay: 2 days  Discharge Date and Time:  01/30/2019 7:25 PM  Discharging Provider: Jian Lambert MD  Primary Care Provider: Samir Mahmood MD     [facility] Medicine Team: [facility] Jian Lambert MD     HPI:   Patient is a 63-year-old male with medical history of CAD (5 stents on ASA plavix), HLD (on statin), Charcot foot, DMII (on insulin), BERHANE (on apap 10-20 QHS), sleeve gastrectomy (12/2016), perianal cyst, right knee degenerative changes (hyaluronic acid injections via ortho X3), questionable hx of lymphedema.      Presenting to the ED for left-sided upper abdominal pain, abdominal distention, bilateral lower extremity edema.  Mr CAMPBELL had a bad episode of pancreatitis in September 2018 requiring inpatient stay for 5 days.  His symptoms included abdominal pain in the midepigastric region that radiated to his back and loose stools, CT scan showed severe inflammation, the etiology was thought to be alcohol.   He has occasional diarrhea and loose stool.  He is taking Creon for that, but is not following a low fat diet.  His blood sugar was noted to be uncontrolled during that admission.     Pt denies fever, no chills, no chest pain, no SOB, no purulent draining or erythema of legs.     Hospital Course:  In ED, patient given lasix 20 IV which produced good UOP.  CXR was unconcerning, specifically there was no evidence of pulmonary edema.  CT abdo revealed cirrhosis, anasarca and increased volume ascites.  TTE reveal HFrEF EF 30% with diastolic dysfunction.  Paracentesis to be performed by IR, unable to identify pocket  on floor.  Added lipase and amylase to para labs to search for possible extravasation of pancreatic pseudocyst fluids into ascites.  Hepatitis panel negative and wife confirmed longstanding alcohol intake, likely contributing to cirrhosis. Diuresing ongoing. IR drained 5L of fluid. SBP ruled out. [facility] consulted and reviewed Psuedocyst/etc, no procedure at this time. Patient will need continued diureses as outpatient. Alcohol abuse was main cause of cirrhosis, patient repetitively underscored his alcohol intake- wife clarified.      Consults:           Consults (From admission, onward)         Status Ordering Provider       Inpatient consult to [facility] ([facility])  Once     Provider:  (Not yet assigned)    Completed FLORIAN FREEMAN A       Inpatient consult to Interventional Radiology  Once     Provider:  (Not yet assigned)    Completed FLORIAN FREEMAN A       Inpatient consult to PICC team ([facility])  Once     Provider:  (Not yet assigned)    Completed SUNIL COFFMAN       Inpatient consult to Registered Dietitian/Nutritionist  Once     Provider:  (Not yet assigned)    Completed TIERRA DAVIS                   Final Active Diagnoses:     Diagnosis Date Noted POA    PRINCIPAL PROBLEM:  Cirrhosis of liver with ascites [K74.60, R18.8] 01/28/2019 Yes    Chronic combined systolic and diastolic heart failure [I50.42] 01/29/2019 Yes    Nutrition disorder [E63.9] 01/29/2019 Yes       Chronic    Lymphedema of both lower extremities [I89.0] 01/29/2019 Yes    Hemoglobin drop [R71.0] 01/29/2019 No    Other ascites [R18.8] 01/28/2019 Yes    Anasarca [R60.1] 01/28/2019 Yes    Diverticulosis [K57.90] 01/28/2019 Yes    Chronic pancreatitis [K86.1] 01/28/2019 Yes    Pseudocyst of pancreas [K86.3] 01/28/2019 Yes    Essential hypertension [I10] 01/28/2019 Yes    Diabetic polyneuropathy associated with type 2 diabetes mellitus [E11.42] 09/02/2015 Yes    Sleep apnea [G47.30] 08/26/2015 Yes    Morbid obesity  with BMI of 50.0-59.9, adult [E66.01, Z68.43] 05/08/2015 Not Applicable    Coronary artery disease due to calcified coronary lesion [I25.10, I25.84] 05/08/2015 Yes    HLD (hyperlipidemia) [E78.5] 05/08/2015 Yes    Type 2 diabetes mellitus, with long-term current use of insulin [E11.9, Z79.4] 05/08/2015 Not Applicable         ___________________________________________________________________    Today:    He complains of decreased strength. Swelling about the same.  Lower extremity still swollen.  No SOB.  No chest.  Abdomen still has some drainage from former paracentesis.    Denied drinking since Sept 28, 2019.    He weighed himself at home 330 lbs.    PAST HISTORY:     Past Medical History:   Diagnosis Date    Alcohol abuse     Alcoholic cirrhosis of liver with ascites     1-28-19 Liver has a cirrhotic morphology with no focal lesions.  Significant interval increase in ascites when compared to prior exam which may account for patient's abdominal distension.  Hypodense air-fluid collection along the body of the pancreas which is slightly smaller when compared to prior CT.  Findings may relate to pancreatic necrosis with pancreatic pseudocysts with infected pseudocyst    Anasarca 1/28/2019    Arthropathy associated with neurological disorder 9/2/2015    Atherosclerosis     Charcot foot due to diabetes mellitus     Chronic combined systolic and diastolic heart failure 01/29/2019 1-28-19 Left VentricleModerate decreased ejection fraction at 30%. Normal left ventricular cavity size. Normal wall thickness observed. Grade I (mild) left ventricular diastolic dysfunction consistent with impaired relaxation. Normal left atrial pressure. Moderate, global hypokinesis(see wall scoring diagram). Right VentricleNormal cavity size, wall thickness and ejection fraction. Wall motion n    Chronic pancreatitis 1/28/2019    Cirrhosis of liver with ascites 1/28/2019    Colon polyps     approx 5 yrs ago    Coronary  artery disease due to calcified coronary lesion 05/08/2015    5 stents on ASA      Diabetic polyneuropathy associated with type 2 diabetes mellitus 9/2/2015    Diverticulosis 1/28/2019    DM type 2 with diabetic peripheral neuropathy 2/4/2019    Essential hypertension 1/28/2019    Former smoker 8/26/2015    Healed ulcer of left foot on examination 6/20/2017    Hydrocele     approx 1.5 yrs ago    Hypoalbuminemia 2/4/2019    Lymphedema of both lower extremities 1/29/2019    Mixed hyperlipidemia 5/8/2015    Morbid obesity with BMI of 50.0-59.9, adult 5/8/2015    Onychomycosis of multiple toenails with type 2 diabetes mellitus and peripheral neuropathy 6/20/2017    Perianal cyst     approx 2 yrs ago    Pseudocyst of pancreas 1/28/2019 1-28-19 Liver has a cirrhotic morphology with no focal lesions.  Significant interval increase in ascites when compared to prior exam which may account for patient's abdominal distension.  Hypodense air-fluid collection along the body of the pancreas which is slightly smaller when compared to prior CT.  Findings may relate to pancreatic necrosis with pancreatic pseudocysts with infected pseudocyst    Skin cancer     skin cancer    Sleep apnea 8/26/2015    Status post bariatric surgery 1/11/2016    Type 2 diabetes mellitus, with long-term current use of insulin 5/8/2015       Past Surgical History:   Procedure Laterality Date    ANGIOPLASTY      total x5 stents    CARDIAC SURGERY      COLONOSCOPY N/A 10/6/2015    Performed by Shekhar Richards MD at [facility] ENDO (2ND FLR)    CYST REMOVAL      GASTRECTOMY      GASTRECTOMY-SLEEVE-LAPAROSCOPIC - 42622 N/A 12/22/2015    Performed by Micheal Villavicencio Jr., MD at [facility] OR 2ND FLR    KNEE ARTHROSCOPY      perianal surgery      perianal cyst removed       Family History   Problem Relation Age of Onset    Cancer Mother     Cancer Father     Obesity Sister     Parkinsonism Brother     No Known Problems Paternal Grandmother      Cancer Paternal Grandfather     Cancer Brother     Diabetes Maternal Grandmother     Stroke Maternal Grandfather        Social History     Socioeconomic History    Marital status:      Spouse name: Not on file    Number of children: Not on file    Years of education: Not on file    Highest education level: Not on file   Social Needs    Financial resource strain: Not on file    Food insecurity - worry: Not on file    Food insecurity - inability: Not on file    Transportation needs - medical: Not on file    Transportation needs - non-medical: Not on file   Occupational History    Not on file   Tobacco Use    Smoking status: Former Smoker     Packs/day: 2.00     Years: 30.00     Pack years: 60.00     Types: Cigarettes     Last attempt to quit: 2005     Years since quittin.0    Smokeless tobacco: Never Used   Substance and Sexual Activity    Alcohol use: Yes     Alcohol/week: 0.0 - 0.6 oz    Drug use: No    Sexual activity: Yes   Other Topics Concern    Not on file   Social History Narrative    Not on file       MEDICATIONS & ALLERGIES:     Current Outpatient Medications on File Prior to Visit   Medication Sig Dispense Refill    aspirin 325 MG tablet Take 325 mg by mouth once daily.      atorvastatin (LIPITOR) 40 MG tablet Take 1 tablet (40 mg total) by mouth once daily. 90 tablet 0    cyanocobalamin, vitamin B-12, 500 mcg Subl Place 1 tablet under the tongue every Monday.       ezetimibe (ZETIA) 10 mg tablet Take 10 mg by mouth once daily.      ferrous sulfate 325 (65 FE) MG EC tablet Take 1 tablet (325 mg total) by mouth once daily.  0    furosemide (LASIX) 40 MG tablet Take 1 tablet (40 mg total) by mouth once daily. 90 tablet 0    insulin aspart U-100 (NOVOLOG) 100 unit/mL injection Inject 4 Units into the skin 3 (three) times daily before meals.      insulin detemir (LEVEMIR FLEXPEN SUBQ) Inject 12 Units into the skin once daily.       lipase-protease-amylase (CREON)  36,000-114,000- 180,000 unit CpDR Take 1 capsule by mouth 3 (three) times daily.       metoprolol succinate (TOPROL-XL) 100 MG 24 hr tablet Take 1 tablet (100 mg total) by mouth once daily. 90 tablet 0    ONETOUCH ULTRA TEST Strp 4 (four) times daily. Use to test blood sugar four times a day.  3    pediatric multivit-iron-min Chew Take 1 tablet by mouth 2 (two) times daily.      ramipril (ALTACE) 2.5 MG capsule Take 2.5 mg by mouth once daily.      spironolactone (ALDACTONE) 100 MG tablet Take 1 tablet (100 mg total) by mouth once daily. 90 tablet 0    vitamin D (VITAMIN D3) 1000 units Tab Take 1 tablet (1,000 Units total) by mouth once daily.       Review of patient's allergies indicates:  No Known Allergies    OBJECTIVE:     Vital Signs:  Vitals:    02/04/19 0815   BP: 126/84   Pulse: 80   Temp: 98.4 °F (36.9 °C)     Wt Readings from Last 1 Encounters:   02/04/19 0815 (!) 149.7 kg (330 lb)     Body mass index is 51.69 kg/m².     Physical Exam:  General: Well developed, well nourished. No distress.  HEENT: Head is normocephalic, atraumatic;   Eyes: Clear conjunctiva.  Neck: Supple, symmetrical neck; trachea midline.  Lungs: Clear to auscultation bilaterally and normal respiratory effort.  Cardiovascular: Heart with regular rate and rhythm.    Extremities: large LE edema 3+ (lymphedema like).  No evidence of infection.  Abdomen: Abdomen is large, soft, non-tender non-distended with normal bowel sounds.  Musculoskeletal: Normal gait.   Psychiatric: Normal affect. Alert.    Laboratory  Lab Results   Component Value Date    WBC 3.84 (L) 01/30/2019    HGB 8.2 (L) 01/30/2019    HCT 25.2 (L) 01/30/2019    MCV 92 01/30/2019     01/30/2019     BMP  Lab Results   Component Value Date     01/30/2019    K 4.1 01/30/2019     01/30/2019    CO2 25 01/30/2019    BUN 16 01/30/2019    CREATININE 1.0 01/30/2019    CALCIUM 7.6 (L) 01/30/2019    ANIONGAP 5 (L) 01/30/2019    ESTGFRAFRICA >60.0 01/30/2019     EGFRNONAA >60.0 01/30/2019     Lab Results   Component Value Date    ALT 9 (L) 01/30/2019    AST 13 01/30/2019    ALKPHOS 76 01/30/2019    BILITOT 0.4 01/30/2019     Lab Results   Component Value Date    INR 1.1 01/28/2019    INR 1.1 01/28/2019     Lab Results   Component Value Date    HGBA1C 6.9 (H) 01/28/2019     Results for FLORIAN HENDERSON (MRN [id number]) as of 2/4/2019 08:22   Ref. Range 1/28/2019 18:17   Cholesterol Latest Ref Range: 120 - 199 mg/dL 85 (L)   HDL Latest Ref Range: 40 - 75 mg/dL 31 (L)   HDL/Chol Ratio Latest Ref Range: 20.0 - 50.0 % 36.5   LDL Cholesterol Latest Ref Range: 63.0 - 159.0 mg/dL 42.8 (L)   Non-HDL Cholesterol Latest Units: mg/dL 54   Total Cholesterol/HDL Ratio Latest Ref Range: 2.0 - 5.0  2.7   Triglycerides Latest Ref Range: 30 - 150 mg/dL 56       TRANSITION OF CARE:     [facility] On Call Contact Note: 1-31-19    Family and/or Caretaker present at visit?  No.  Diagnostic tests reviewed/disposition: I have reviewed all completed as well as pending diagnostic tests at the time of discharge.  Disease/illness education:  CHF, cirrhosis  Home health/community services discussion/referrals: Patient does not have home health established from hospital visit.  They do need home health.  If needed, we will set up home health for the patient.   Will do home health Nurse.  Establishment or re-establishment of referral orders for community resources: No other necessary community resources.    Discussion with other health care providers: No discussion with other health care providers necessary.     Transition of Care Visit:     I have reviewed and updated the history and problem list.  I have reconciled the medication list.  I have discussed the hospitalization and current medical issues, prognosis and plans with the patient/family.  I  spent more than 50% of time discussing the care with the patient/family.  Total Encounter in the [facility]: 60 minutes.    Medications Reconciliation:   I  have reconciled the patient's home medications and discharge medications with the patient/family. I have updated all changes.  Refer to After-Visit Medication List.    ASSESSMENT & PLAN:     Chronic combined systolic and diastolic heart failure  Coronary artery disease due to calcified coronary lesion  Essential hypertension  Lymphedema of both lower extremities  - BNP 89.  Most of the fluid appears to be related to chronic lymphedema plus cirrhosis.  - Continue Toprol  mg daily and Ramipirl 2.5 mg daily                    Lasix 40 mg and Aldactone 100 mg daily                    Aspirin 325 mg plus Lipitor 40 mg daily.  Refilled:  -     furosemide (LASIX) 40 MG tablet; Take 1 tablet (40 mg total) by mouth once daily.  Dispense: 90 tablet; Refill: 3  -     spironolactone (ALDACTONE) 100 MG tablet; Take 1 tablet (100 mg total) by mouth once daily.  Dispense: 90 tablet; Refill: 3  -     metoprolol succinate (TOPROL-XL) 100 MG 24 hr tablet; Take 1 tablet (100 mg total) by mouth once daily.  Dispense: 90 tablet; Refill: 3  -     ramipril (ALTACE) 2.5 MG capsule; Take 1 capsule (2.5 mg total) by mouth once daily.  Dispense: 90 capsule; Refill: 3  -     Ambulatory consult to Cardiology.    Anasarca   Alcoholic cirrhosis of liver with ascites  Hypoalbuminemia  -     Ambulatory Referral to Hepatology  -     Started on Lasix and Aldactone during hospitalization.    Alcohol-induced chronic pancreatitis  Pseudocyst of pancreas  - No drinking since 9-2108.  -     lipase-protease-amylase (CREON) 36,000-114,000- 180,000 unit CpDR; Take 1 capsule by mouth 3 (three) times daily.  Dispense: 270 capsule; Refill: 3    DM type 2 with diabetic peripheral neuropathy  Diabetic polyneuropathy associated with type 2 diabetes mellitus  - Continue Levemir 12 units daily and Novolog 4 units TID with meal.    Mixed hyperlipidemia  -     atorvastatin (LIPITOR) 40 MG tablet; Take 1 tablet (40 mg total) by mouth once daily.  Dispense: 90  tablet; Refill: 3    Morbid obesity with BMI of 50.0-59.9, adult  Status post bariatric surgery    Obstructive sleep apnea syndrome  - On CPAP.    Normocytic anemia  - On Iron and B12 replacement.    Scheduled Follow-up :    Fluid Management Instructions      Monitor daily weight.  Regular activity within patient's limitations.  Low salt, low fat and low choleterol diet and restrict fluid < 2L per day.  Chew gum to avoid thirst sensation.  Call MD if SOB, chest pain, weight gain > 2-3 lbs per day and/or 5-6 lbs per week.     - Home nurse evaluation and treatment (Savanah Pena NP).    Future Appointments   Date Time Provider Department Center   2/7/2019  1:40 PM Aleshia Almonte NP [facility] HEPAT [facility]   2/7/2019  2:40 PM Abhijit Rincon MD [facility] CARDIO [facility]   3/18/2019  2:45 PM Samir Mahmood MD [facility] IM [facility]       After Visit Medication List :     Medication List           Accurate as of 2/4/19  9:22 AM. If you have any questions, ask your nurse or doctor.               CONTINUE taking these medications    aspirin 325 MG tablet     atorvastatin 40 MG tablet  Commonly known as:  LIPITOR  Take 1 tablet (40 mg total) by mouth once daily.     cyanocobalamin (vitamin B-12) 500 mcg Subl     ferrous sulfate 325 (65 FE) MG EC tablet  Take 1 tablet (325 mg total) by mouth once daily.     furosemide 40 MG tablet  Commonly known as:  LASIX  Take 1 tablet (40 mg total) by mouth once daily.     insulin aspart U-100 100 unit/mL injection  Commonly known as:  NOVOLOG     LEVEMIR FLEXPEN SUBQ     lipase-protease-amylase 36,000-114,000- 180,000 unit Cpdr  Commonly known as:  CREON  Take 1 capsule by mouth 3 (three) times daily.     metoprolol succinate 100 MG 24 hr tablet  Commonly known as:  TOPROL-XL  Take 1 tablet (100 mg total) by mouth once daily.     ONETOUCH ULTRA TEST Strp  Generic drug:  blood sugar diagnostic     ramipril 2.5 MG capsule  Commonly known as:  ALTACE  Take 1 capsule (2.5 mg total) by mouth  once daily.     spironolactone 100 MG tablet  Commonly known as:  ALDACTONE  Take 1 tablet (100 mg total) by mouth once daily.     vitamin D 1000 units Tab  Commonly known as:  VITAMIN D3  Take 1 tablet (1,000 Units total) by mouth once daily.        STOP taking these medications    ezetimibe 10 mg tablet  Commonly known as:  ZETIA  Stopped by:  Alfonso Mahmood MD     omeprazole 40 MG capsule  Commonly known as:  PriLOSEC  Stopped by:  Alfonso Mahmood MD     pediatric multivit-iron-min Chew  Stopped by:  Alfonso Mahmood MD           Where to Get Your Medications      These medications were sent to [facility]/pharmacy #[id number] - [place] - [address]  [address] [address]    Phone:  [phone]   · atorvastatin 40 MG tablet  · furosemide 40 MG tablet  · lipase-protease-amylase 36,000-114,000- 180,000 unit Cpdr  · metoprolol succinate 100 MG 24 hr tablet  · ramipril 2.5 MG capsule  · spironolactone 100 MG tablet         Signing Physician:  Alfonso Mahmood MD

## 2019-02-04 NOTE — PATIENT INSTRUCTIONS
Fluid Management Instructions      Monitor daily weight.  Regular activity within patient's limitations.  Low salt, low fat and low choleterol diet and restrict fluid < 2L per day.  Chew gum to avoid thirst sensation.  Call MD if SOB, chest pain, weight gain > 2-3 lbs per day and/or 5-6 lbs per week.

## 2019-02-05 LAB — BACTERIA SPEC ANAEROBE CULT: NORMAL

## 2019-02-06 NOTE — PROGRESS NOTES
"[facility] VISIT NEW PT NOTE    REFERRING PROVIDER:  Dr. Alfonso Mahmood    CHIEF COMPLAINT: decompensated cirrhosis (?)    HPI: This is a 63 y.o. White male with PMH noted below, presenting for evaluation of possible decompensated cirrhosis (?) unknown etiology. Notation of previous alcohol use starting in ~2014.      With ascites. No h/o hepatic encephalopathy, or history of variceal bleeding     Was hospitalized 1/28/19 - 1/30/19:   -- presented left sided upper abd pain, significant ascites and worsening BLE edema. Para performed, diuretics adjusted  -- CT abdo revealed cirrhosis, anasarca and increased volume ascites.  TTE reveal HFrEF EF 30% with diastolic dysfunction    Reports diagnosis of cirrhosis when hospitalized 1/2019. No history of liver biopsy. Unsure of diagnosis of cirrhosis versus congestive hepatopathy (?)    Patient reports the following alcohol history: Last drink 9/2018   Social History     Substance and Sexual Activity   Alcohol Use No    Frequency: Never    Comment: started ~2014, reports 1 shot daily, max 3 shots daily, vague about alcohol consumption      Patient vague about alcohol intake, previous hospital notes report that wife notes heavy alcohol consumption but no notation of amount of intake and wife not in visit today     Interval HPI: Presents today alone in wheelchair. Significant LE edema, appt with cardiology today immediately following this appt.     CT was done 1/28/19 showed cirrhotic morphology to the liver with a nodular contour.  No focal liver lesions are noted on single phase of imaging.  No significant intrahepatic biliary ductal dilatation    Of note: for pancreatic pseudocyst, followed by Dr. Hall 12/8/18: per last note "I would like to wait a bit more to allow the necroma to wall off a bit better. I've ordered a CT scan to happen n 6 weeks.  I'll follow up with him over the phone then to determined the timing of a necrsectomy"    Lab Results   Component " Value Date    ALT 9 (L) 01/30/2019    AST 13 01/30/2019    ALKPHOS 76 01/30/2019    BILITOT 0.4 01/30/2019    ALBUMIN 2.0 (L) 01/30/2019    INR 1.1 01/28/2019     01/30/2019       MELD-Na score: 7 at 1/30/2019  6:45 AM  MELD score: 7 at 1/30/2019  6:45 AM  Calculated from:  Serum Creatinine: 1 mg/dL at 1/30/2019  6:45 AM  Serum Sodium: 136 mmol/L at 1/30/2019  6:45 AM  Total Bilirubin: 0.4 mg/dL (Rounded to 1 mg/dL) at 1/30/2019  6:45 AM  INR(ratio): 1.1 at 1/28/2019  1:49 PM  Age: 63 years    Previous serologic w/u negative for hemochromatosis, and viral hepatitis. Remainder of workup to be completed today     Symptoms of decompensation:   1. Ascites : requiring para while hospitalized 1/29/19  -- SAAG = 0.8 = NOT c/w portal HTN/cirrhosis etiology. Protein level 1.9  -- last para 1/29/19, how much removed 5L  -- Diuretic therapy Yes: Lasix 40 mg daily and Spironolactone 100 mg daily   -- Reports following Low Na diet     No h/o HE or variceal bleeding     Cirrhosis Health Maintenance:   -- Last EGD ?? Pt unsure  -- HCC screening   CT (not triple phase) 1/28/19 no lesions   AFP - to be done today   -- Immunity to Hep A and B - to check today    Risk factors for fatty liver include possible heavy alcohol use in the past (?? Vague intake of alcohol).  Risk factors for NAFLD include morbid obesity, HTN, HLD, T2DM.     Also + risk for congestive hepatopathy given CHF    Denies jaundice, dark urine,  hematemesis, melena, slowed mentation. No abnormal skin rashes. No generalized joint or muscle pain. + mild abdominal distension (difficult to determine etiology given morbid obesity)     Review of patient's allergies indicates:  No Known Allergies    Current Outpatient Medications on File Prior to Visit   Medication Sig Dispense Refill    aspirin 325 MG tablet Take 325 mg by mouth once daily.      atorvastatin (LIPITOR) 40 MG tablet Take 1 tablet (40 mg total) by mouth once daily. 90 tablet 3    cyanocobalamin,  vitamin B-12, 500 mcg Subl Place 1 tablet under the tongue every Monday.       ferrous sulfate 325 (65 FE) MG EC tablet Take 1 tablet (325 mg total) by mouth once daily.  0    furosemide (LASIX) 40 MG tablet Take 1 tablet (40 mg total) by mouth once daily. 90 tablet 3    insulin aspart U-100 (NOVOLOG) 100 unit/mL injection Inject 4 Units into the skin 3 (three) times daily before meals.      insulin detemir (LEVEMIR FLEXPEN SUBQ) Inject 12 Units into the skin once daily.       lipase-protease-amylase (CREON) 36,000-114,000- 180,000 unit CpDR Take 1 capsule by mouth 3 (three) times daily. 270 capsule 3    metoprolol succinate (TOPROL-XL) 100 MG 24 hr tablet Take 1 tablet (100 mg total) by mouth once daily. 90 tablet 3    ONETOUCH ULTRA TEST Strp 4 (four) times daily. Use to test blood sugar four times a day.  3    ramipril (ALTACE) 2.5 MG capsule Take 1 capsule (2.5 mg total) by mouth once daily. 90 capsule 3    spironolactone (ALDACTONE) 100 MG tablet Take 1 tablet (100 mg total) by mouth once daily. 90 tablet 3    vitamin D (VITAMIN D3) 1000 units Tab Take 1 tablet (1,000 Units total) by mouth once daily.       No current facility-administered medications on file prior to visit.        PMHX:  has a past medical history of Alcohol abuse, Anasarca (1/28/2019), Arthropathy associated with neurological disorder (9/2/2015), Atherosclerosis, Charcot foot due to diabetes mellitus, Chronic combined systolic and diastolic heart failure (01/29/2019), Chronic pancreatitis (1/28/2019), Cirrhosis of liver with ascites (1/28/2019), Colon polyps, Coronary artery disease due to calcified coronary lesion (05/08/2015), Diabetic polyneuropathy associated with type 2 diabetes mellitus (9/2/2015), Diverticulosis (1/28/2019), DM type 2 with diabetic peripheral neuropathy (2/4/2019), Essential hypertension (1/28/2019), Former smoker (8/26/2015), Healed ulcer of left foot on examination (6/20/2017), Hydrocele, Hypoalbuminemia  (2019), Lymphedema of both lower extremities (2019), Mixed hyperlipidemia (2015), Morbid obesity with BMI of 50.0-59.9, adult (2015), Onychomycosis of multiple toenails with type 2 diabetes mellitus and peripheral neuropathy (2017), Other cirrhosis of liver, Perianal cyst, Pseudocyst of pancreas (2019), Skin cancer, Sleep apnea (2015), Status post bariatric surgery (2016), and Type 2 diabetes mellitus, with long-term current use of insulin (2015).    PSHX:  has a past surgical history that includes Angioplasty; Cyst Removal; perianal surgery; Colonoscopy (N/A, 10/6/2015); Knee arthroscopy; Gastrectomy; and Coronary artery bypass graft ().    FAMILY HISTORY: Negative for liver disease, reviewed in EPIC    SOCIAL HISTORY:   Social History     Tobacco Use   Smoking Status Former Smoker    Packs/day: 2.00    Years: 30.00    Pack years: 60.00    Types: Cigarettes    Last attempt to quit: 2005    Years since quittin.0   Smokeless Tobacco Never Used       Social History     Substance and Sexual Activity   Alcohol Use No    Comment: started ~, reports 1 shot daily, max 3 shots daily, vague about alcohol consumption. Last drink 2018       Social History     Substance and Sexual Activity   Drug Use No       ROS:   GENERAL: Denies fever, chills, weight loss/gain, + fatigue  HEENT: Denies headaches, dizziness, vision/hearing changes  CARDIOVASCULAR: Denies chest pain, palpitations, + significant BLE edema (pt reports chronic lymphedema)  RESPIRATORY: + CHURCH, denies cough  GI: Denies abdominal pain, rectal bleeding, nausea, vomiting. No change in bowel pattern or color  : Denies dysuria, hematuria   SKIN: Denies rash, itching   NEURO: Denies confusion, memory loss, or mood changes  PSYCH: Denies depression or anxiety  HEME/LYMPH: + easy bruising, denies bleeding    PHYSICAL EXAM:   Friendly White male, Chronically ill-appearing. In wheelchair; alert and oriented  "to person, place and time  VITALS: BP (!) 106/94 (BP Location: Left arm, Patient Position: Sitting, BP Method: Medium (Automatic))   Pulse 83   Resp 18   Ht 5' 7" (1.702 m)   Wt (!) 148.3 kg (327 lb)   SpO2 100%   BMI 51.22 kg/m²   HENT: Normocephalic, without obvious abnormality. Oral mucosa pink and moist. Dentition good.  EYES: Sclerae anicteric. No conjunctival pallor.   NECK: Supple. No masses or cervical adenopathy.  CARDIOVASCULAR: Regular rate and rhythm.   RESPIRATORY: Normal respiratory effort. BBS CTA. No wheezes or crackles.  GI: Soft, non-tender, + mildly distended (difficult to determine if ascites present due to protuberant abdomen, morbid obesity). YONIS hepatosplenomegaly due to protuberant abdomen. No masses palpable.  EXTREMITIES:  No clubbing, cyanosis, + 4 BLE edema (pt reports chronic lymphedema)  SKIN: Warm and dry. No jaundice. No rashes noted to exposed skin. No telangectasias noted. No palmar erythema.  NEURO:  YONIS gait (in wheelchair). No asterixis.  PSYCH:  Memory intact. Thought and speech pattern appropriate. Behavior normal. No depression or anxiety noted.    RECENT LABS:    Hepatitis A and B immunity markers:    No results found for: HEPAIGG    Hepatitis B Surface Ag   Date Value Ref Range Status   01/28/2019 Negative  Final     No results found for: HEPBCAB  No results found for: HEPBSAB  Immunization History   Administered Date(s) Administered    Influenza 10/31/2011, 12/03/2014    Influenza A (H1N1) 2009 Monovalent - IM 11/18/2009    Tdap 03/13/2017       Labs:  Lab Results   Component Value Date    WBC 3.84 (L) 01/30/2019    HGB 8.2 (L) 01/30/2019    HCT 25.2 (L) 01/30/2019     01/30/2019    CHOL 85 (L) 01/28/2019    TRIG 56 01/28/2019    HDL 31 (L) 01/28/2019     01/30/2019    K 4.1 01/30/2019    CREATININE 1.0 01/30/2019    ALT 9 (L) 01/30/2019    AST 13 01/30/2019    ALKPHOS 76 01/30/2019    BILITOT 0.4 01/30/2019    ALBUMIN 2.0 (L) 01/30/2019    INR 1.1 " 01/28/2019       DIAGNOSTIC STUDIES:  EGD-   None   COLONOSCOPY-   Due for repeat 10/2020  ABD. U/S-  CT SCAN-   Done 1/28/19  FINDINGS:  Heart is not enlarged.  There is extensive coronary atherosclerosis.  Visualized portions of the lung bases demonstrate no acute abnormalities.    There is a cirrhotic morphology to the liver with a nodular contour.  No focal liver lesions are noted on single phase of imaging.  No significant intrahepatic biliary ductal dilatation.  Gallbladder is mildly distended but shows no evidence of radiopaque Stones or wall thickening.  There are postoperative changes of sleeve gastrectomy.  There is a 9.3 x 3.9 x 3.4 cm hypodense air-fluid collection centered around the body of the pancreas.  The portal vein is narrowed near the confluence of the splenic vein and SMV although these vessels appear patent.    Spleen is upper normal in size.    Small and large bowel show no evidence of obstruction.  There is no free air.  Appendix not definitely visualized.  Tubular air-filled structure in the right lower abdomen is possibly the appendix and appears within normal limits.  There is diverticulosis.    Kidneys have a lobular contour with vascular calcifications.  No evidence of hydronephrosis.  Kidneys concentrate excrete contrast on delayed images.  Urinary bladder is nondistended but grossly unremarkable.    Significant interval increase in ascites when compared to prior exam.  Ascites measures slightly higher in attenuation than simple fluid although this may be in part due to artifact from patient's thick body wall and anasarca.    Aorta is normal in caliber with severe atherosclerosis.  There is diffuse anasarca.  Bones demonstrate no acute abnormalities.      Impression       Liver has a cirrhotic morphology with no focal lesions.  Significant interval increase in ascites when compared to prior exam which may account for patient's abdominal distension.    Hypodense air-fluid collection  along the body of the pancreas which is slightly smaller when compared to prior CT.  Findings may relate to pancreatic necrosis with pancreatic pseudocysts with infected pseudocyst not excluded.  Mass effect with narrowing of the portal confluence in the region although the vessels appear patent.    Diffuse anasarca.    Sleeve gastrectomy.    Extensive atherosclerosis.       MRI-  LIVER BIOPSY-  FIBROSCAN -     ASSESSMENT:  63 y.o. White male with:  1.  Cirrhosis (??) versus congestive hepatopathy  - would need biopsy to determine   -- MELD-Na score: 7 at 1/30/2019  6:45 AM  MELD score: 7 at 1/30/2019  6:45 AM  Calculated from:  Serum Creatinine: 1 mg/dL at 1/30/2019  6:45 AM  Serum Sodium: 136 mmol/L at 1/30/2019  6:45 AM  Total Bilirubin: 0.4 mg/dL (Rounded to 1 mg/dL) at 1/30/2019  6:45 AM  INR(ratio): 1.1 at 1/28/2019  1:49 PM  Age: 63 years  -- HCC screening: AFP and abd. U/S.. CT without lesions, AFP to be done today - both next due 8/2019 (if cirrhosis confirmed)  -- Immunity to Hep A and B - will check today   -- EGD in past : unknown, none in past year   --- Serological workup to be done today      2. AMRIE versus HUGH  -- unsure of intake of alcohol and duration, as patient is vague and previous hospital notes note patient underestimating alcohol intake and wife reported heavy alcohol intake, so unsure (?) if contributed  -- all risk factors for NAFLD: T2DM, morbid obesity, HTN, HLD    3. Chronic systolic and diastolic HF  -- appt with cardiology today     4. Ascites  -- SAAG from para 1/29/19 not consistent with ascites 2/2 portal HTN, may be cardiac ascites (?) or multifactorial (protein 1.9)   -- no Splenomegaly or thrombocytopenia     5. Significant BLE edema/ chronic lymphedema   -- diuretic dosing: Lasix 40 mg daily, Spironolactone 100 mg daily   -- pt reports chronic lymphedema, pt with appt with cardiology today     6. Previous Alcohol use  Social History     Substance and Sexual Activity   Alcohol  "Use No    Comment: started ~2014, reports 1 shot daily, max 3 shots daily, vague about alcohol consumption. Last drink 9/2018   -- -- unsure of intake of alcohol and duration, as patient is vague and previous hospital notes note patient underestimating alcohol intake and wife reported heavy alcohol intake, so unsure (?) if contributed    7. PSEUDOCYST OF PANCREAS  - -- Initial acute pancreatitis episode September 2018, 5 day inpatient hospital stay  - Scheduled for re-imaging day of admission, however pt came to [facility] ED as he felt his current state (anasarca, abdominal ascites, LE edema) prevented him form making that appointment  -- followed by Dr. Hall 12/8/18: per last note "I would like to wait a bit more to allow the necroma to wall off a bit better. I've ordered a CT scan to happen n 6 weeks.  I'll follow up with him over the phone then to determined the timing of a necrsectomy"    8. T2DM, HTN, HLD, morbid obesity  -- Body mass index is 51.22 kg/m².   -- increases risk for NAFLD      EDUCATION:     Discussed recommendation for transjugular liver biopsy to determine if patient has cirrhosis and determine need for chronic hepatology follow up in future.     The disease process and manifestations of cirrhosis were discussed.    Discussed implications of a cirrhosis diagnosis with HCC screenings and EGD and why it is important for us to properly monitor for potential complications.     Signs and symptoms of hepatic decompensation were reviewed, including jaundice, ascites, and slowed mentation due to hepatic encephalopathy. The patient should seek medical attention if any of these things occur.  We discussed the potential for bleeding from esophageal varices with symptoms of hematemesis and melena. The patient should report to the Emergency Department for these symptoms.    We discussed the increased risk of hepatocellular carcinoma due to cirrhosis. Continued screening every six months with ultrasound and " AFP is recommended, discussed with patient.     Counseling  - strict abstinence of alcohol use (includes beer, wine, and/or liquor)  - avoid non-steroidal anti-inflammatory drugs (NSAIDs) such as ibuprofen, Motrin, naprosyn, Alleve due to the risk of kidney damage  - can take acetaminophen (Tylenol), no more than 2000 mg per day  - low sodium (salt) 2 gram per day diet  - high protein diet: 160 grams per day to prevent muscle mass loss. Recommended at least 1 protein shake daily using Premier Protein shakes    - resistance exercises for muscle strength  - avoid raw seafoods due to the risk of fatal Vibrio vulnificus infection  - ultrasound of the liver every 6 months for liver cancer screening  - Upper endoscopy every 1-2 years to screen for varices in the stomach and esophagus      PLAN:  1. Labs today for full serological workup and MELD labs   Orders Placed This Encounter   Procedures    AFP tumor marker    CBC auto differential    Comprehensive metabolic panel    Protime-INR    Hepatitis A antibody, IgG    Hepatitis B core antibody, total    Hepatitis B surface antibody    Ceruloplasmin    Alpha-1-antitrypsin    CAROLYNN Screen w/Reflex    Antimitochondrial antibody    Anti-smooth muscle antibody    IgG    IgM     2. For now, defer diuretic dosing to cardiology. Will follow along   3. No paracentesis needed currently   4. If confirmed to have cirrhosis,  HCC screening Q 6 months with AFP and CT or MRI (given size) - both next due 8/2019 and also EGD to screen for varices   5. Recommend TJ liver biopsy with portal pressure measurements to determine if cirrhosis present. Briefly discussed with patient today and will discuss more at visit in 1 week   6. Will check immunity markers for HBV/HAV and arrange for vaccination if needed.  7. Cirrhosis counseling as noted above and discussed with patient  8. Follow-up in about 1 week (around 2/14/2019). with me       Thank you for allowing me to participate in the  care of Jian Almonte, RADHA-C    ADDENDUM 2/14/19: Serological workup was negative for Anjel's, alpha-1 antitrypsin deficiency, hemochromatosis, autoimmune etiology (except mildly elevated IgG 1700), and viral hepatitis. Needs Hep B vaccine, sent to [facility].   Will proceed with EUS liver biopsy since he will already be undergoing EUS and EGD  Will f/u after liver biopsy completed    CC'ed note to:   MD Dr. SON Islas

## 2019-02-07 ENCOUNTER — OFFICE VISIT (OUTPATIENT)
Dept: CARDIOLOGY | Facility: CLINIC | Age: 64
End: 2019-02-07
Payer: MEDICARE

## 2019-02-07 ENCOUNTER — LAB VISIT (OUTPATIENT)
Dept: LAB | Facility: HOSPITAL | Age: 64
End: 2019-02-07
Payer: COMMERCIAL

## 2019-02-07 ENCOUNTER — OFFICE VISIT (OUTPATIENT)
Dept: HEPATOLOGY | Facility: CLINIC | Age: 64
End: 2019-02-07
Payer: COMMERCIAL

## 2019-02-07 ENCOUNTER — TELEPHONE (OUTPATIENT)
Dept: CARDIOLOGY | Facility: CLINIC | Age: 64
End: 2019-02-07

## 2019-02-07 VITALS
HEIGHT: 67 IN | DIASTOLIC BLOOD PRESSURE: 61 MMHG | WEIGHT: 315 LBS | OXYGEN SATURATION: 100 % | HEART RATE: 82 BPM | SYSTOLIC BLOOD PRESSURE: 104 MMHG | BODY MASS INDEX: 49.44 KG/M2

## 2019-02-07 VITALS
HEIGHT: 67 IN | OXYGEN SATURATION: 100 % | RESPIRATION RATE: 18 BRPM | SYSTOLIC BLOOD PRESSURE: 106 MMHG | BODY MASS INDEX: 49.44 KG/M2 | WEIGHT: 315 LBS | HEART RATE: 83 BPM | DIASTOLIC BLOOD PRESSURE: 94 MMHG

## 2019-02-07 DIAGNOSIS — E78.2 MIXED HYPERLIPIDEMIA: ICD-10-CM

## 2019-02-07 DIAGNOSIS — K74.69 OTHER CIRRHOSIS OF LIVER: Primary | ICD-10-CM

## 2019-02-07 DIAGNOSIS — I25.84 CORONARY ARTERY DISEASE DUE TO CALCIFIED CORONARY LESION: Primary | ICD-10-CM

## 2019-02-07 DIAGNOSIS — E11.42 DIABETIC POLYNEUROPATHY ASSOCIATED WITH TYPE 2 DIABETES MELLITUS: ICD-10-CM

## 2019-02-07 DIAGNOSIS — I42.6 ALCOHOLIC CARDIOMYOPATHY: ICD-10-CM

## 2019-02-07 DIAGNOSIS — E66.01 MORBID OBESITY WITH BMI OF 50.0-59.9, ADULT: ICD-10-CM

## 2019-02-07 DIAGNOSIS — I10 ESSENTIAL HYPERTENSION: ICD-10-CM

## 2019-02-07 DIAGNOSIS — I25.10 CORONARY ARTERY DISEASE DUE TO CALCIFIED CORONARY LESION: Primary | ICD-10-CM

## 2019-02-07 DIAGNOSIS — R60.0 BILATERAL LOWER EXTREMITY EDEMA: ICD-10-CM

## 2019-02-07 DIAGNOSIS — I50.42 CHRONIC COMBINED SYSTOLIC AND DIASTOLIC HEART FAILURE: ICD-10-CM

## 2019-02-07 DIAGNOSIS — K86.3 PSEUDOCYST OF PANCREAS: ICD-10-CM

## 2019-02-07 DIAGNOSIS — K74.69 OTHER CIRRHOSIS OF LIVER: ICD-10-CM

## 2019-02-07 LAB
A1AT SERPL-MCNC: 162 MG/DL
AFP SERPL-MCNC: 1.5 NG/ML
ALBUMIN SERPL BCP-MCNC: 1.9 G/DL
ALP SERPL-CCNC: 124 U/L
ALT SERPL W/O P-5'-P-CCNC: 19 U/L
ANION GAP SERPL CALC-SCNC: 4 MMOL/L
AST SERPL-CCNC: 20 U/L
BASOPHILS # BLD AUTO: 0.05 K/UL
BASOPHILS NFR BLD: 0.5 %
BILIRUB SERPL-MCNC: 0.5 MG/DL
BUN SERPL-MCNC: 21 MG/DL
CALCIUM SERPL-MCNC: 7.9 MG/DL
CERULOPLASMIN SERPL-MCNC: 25 MG/DL
CHLORIDE SERPL-SCNC: 102 MMOL/L
CO2 SERPL-SCNC: 29 MMOL/L
CREAT SERPL-MCNC: 1.2 MG/DL
DIFFERENTIAL METHOD: ABNORMAL
EOSINOPHIL # BLD AUTO: 0.1 K/UL
EOSINOPHIL NFR BLD: 0.6 %
ERYTHROCYTE [DISTWIDTH] IN BLOOD BY AUTOMATED COUNT: 14.1 %
EST. GFR  (AFRICAN AMERICAN): >60 ML/MIN/1.73 M^2
EST. GFR  (NON AFRICAN AMERICAN): >60 ML/MIN/1.73 M^2
GLUCOSE SERPL-MCNC: 137 MG/DL
HCT VFR BLD AUTO: 30.5 %
HGB BLD-MCNC: 9.7 G/DL
IGG SERPL-MCNC: 1700 MG/DL
IGM SERPL-MCNC: 187 MG/DL
IMM GRANULOCYTES # BLD AUTO: 0.05 K/UL
IMM GRANULOCYTES NFR BLD AUTO: 0.5 %
INR PPP: 1.1
LYMPHOCYTES # BLD AUTO: 2.5 K/UL
LYMPHOCYTES NFR BLD: 25.7 %
MCH RBC QN AUTO: 28.8 PG
MCHC RBC AUTO-ENTMCNC: 31.8 G/DL
MCV RBC AUTO: 91 FL
MONOCYTES # BLD AUTO: 1 K/UL
MONOCYTES NFR BLD: 9.6 %
NEUTROPHILS # BLD AUTO: 6.3 K/UL
NEUTROPHILS NFR BLD: 63.1 %
NRBC BLD-RTO: 0 /100 WBC
PLATELET # BLD AUTO: 458 K/UL
PMV BLD AUTO: 9.1 FL
POTASSIUM SERPL-SCNC: 4.8 MMOL/L
PROT SERPL-MCNC: 5.8 G/DL
PROTHROMBIN TIME: 11.3 SEC
RBC # BLD AUTO: 3.37 M/UL
SODIUM SERPL-SCNC: 135 MMOL/L
WBC # BLD AUTO: 9.9 K/UL

## 2019-02-07 PROCEDURE — 99214 PR OFFICE/OUTPT VISIT, EST, LEVL IV, 30-39 MIN: ICD-10-PCS | Mod: S$GLB,,, | Performed by: NURSE PRACTITIONER

## 2019-02-07 PROCEDURE — 86235 NUCLEAR ANTIGEN ANTIBODY: CPT

## 2019-02-07 PROCEDURE — 3074F PR MOST RECENT SYSTOLIC BLOOD PRESSURE < 130 MM HG: ICD-10-PCS | Mod: CPTII,S$GLB,, | Performed by: NURSE PRACTITIONER

## 2019-02-07 PROCEDURE — 99999 PR PBB SHADOW E&M-EST. PATIENT-LVL IV: ICD-10-PCS | Mod: PBBFAC,,, | Performed by: INTERNAL MEDICINE

## 2019-02-07 PROCEDURE — 99203 PR OFFICE/OUTPT VISIT, NEW, LEVL III, 30-44 MIN: ICD-10-PCS | Mod: S$GLB,,, | Performed by: INTERNAL MEDICINE

## 2019-02-07 PROCEDURE — 86790 VIRUS ANTIBODY NOS: CPT

## 2019-02-07 PROCEDURE — 86256 FLUORESCENT ANTIBODY TITER: CPT | Mod: 91

## 2019-02-07 PROCEDURE — 82105 ALPHA-FETOPROTEIN SERUM: CPT

## 2019-02-07 PROCEDURE — 82784 ASSAY IGA/IGD/IGG/IGM EACH: CPT | Mod: 59

## 2019-02-07 PROCEDURE — 99214 OFFICE O/P EST MOD 30 MIN: CPT | Mod: S$GLB,,, | Performed by: NURSE PRACTITIONER

## 2019-02-07 PROCEDURE — 99999 PR PBB SHADOW E&M-EST. PATIENT-LVL IV: CPT | Mod: PBBFAC,,, | Performed by: NURSE PRACTITIONER

## 2019-02-07 PROCEDURE — 99499 RISK ADDL DX/OHS AUDIT: ICD-10-PCS | Mod: S$GLB,,, | Performed by: INTERNAL MEDICINE

## 2019-02-07 PROCEDURE — 3044F PR MOST RECENT HEMOGLOBIN A1C LEVEL <7.0%: ICD-10-PCS | Mod: CPTII,S$GLB,, | Performed by: NURSE PRACTITIONER

## 2019-02-07 PROCEDURE — 99499 RISK ADDL DX/OHS AUDIT: ICD-10-PCS | Mod: S$GLB,,, | Performed by: NURSE PRACTITIONER

## 2019-02-07 PROCEDURE — 3044F HG A1C LEVEL LT 7.0%: CPT | Mod: CPTII,S$GLB,, | Performed by: NURSE PRACTITIONER

## 2019-02-07 PROCEDURE — 3078F DIAST BP <80 MM HG: CPT | Mod: CPTII,S$GLB,, | Performed by: INTERNAL MEDICINE

## 2019-02-07 PROCEDURE — 3074F PR MOST RECENT SYSTOLIC BLOOD PRESSURE < 130 MM HG: ICD-10-PCS | Mod: CPTII,S$GLB,, | Performed by: INTERNAL MEDICINE

## 2019-02-07 PROCEDURE — 99999 PR PBB SHADOW E&M-EST. PATIENT-LVL IV: ICD-10-PCS | Mod: PBBFAC,,, | Performed by: NURSE PRACTITIONER

## 2019-02-07 PROCEDURE — 82103 ALPHA-1-ANTITRYPSIN TOTAL: CPT

## 2019-02-07 PROCEDURE — 3078F DIAST BP <80 MM HG: CPT | Mod: CPTII,S$GLB,, | Performed by: NURSE PRACTITIONER

## 2019-02-07 PROCEDURE — 3008F BODY MASS INDEX DOCD: CPT | Mod: CPTII,S$GLB,, | Performed by: INTERNAL MEDICINE

## 2019-02-07 PROCEDURE — 3008F BODY MASS INDEX DOCD: CPT | Mod: CPTII,S$GLB,, | Performed by: NURSE PRACTITIONER

## 2019-02-07 PROCEDURE — 85610 PROTHROMBIN TIME: CPT

## 2019-02-07 PROCEDURE — 3074F SYST BP LT 130 MM HG: CPT | Mod: CPTII,S$GLB,, | Performed by: NURSE PRACTITIONER

## 2019-02-07 PROCEDURE — 82390 ASSAY OF CERULOPLASMIN: CPT

## 2019-02-07 PROCEDURE — 3008F PR BODY MASS INDEX (BMI) DOCUMENTED: ICD-10-PCS | Mod: CPTII,S$GLB,, | Performed by: INTERNAL MEDICINE

## 2019-02-07 PROCEDURE — 99499 UNLISTED E&M SERVICE: CPT | Mod: S$GLB,,, | Performed by: INTERNAL MEDICINE

## 2019-02-07 PROCEDURE — 82784 ASSAY IGA/IGD/IGG/IGM EACH: CPT

## 2019-02-07 PROCEDURE — 3078F PR MOST RECENT DIASTOLIC BLOOD PRESSURE < 80 MM HG: ICD-10-PCS | Mod: CPTII,S$GLB,, | Performed by: NURSE PRACTITIONER

## 2019-02-07 PROCEDURE — 3078F PR MOST RECENT DIASTOLIC BLOOD PRESSURE < 80 MM HG: ICD-10-PCS | Mod: CPTII,S$GLB,, | Performed by: INTERNAL MEDICINE

## 2019-02-07 PROCEDURE — 85025 COMPLETE CBC W/AUTO DIFF WBC: CPT

## 2019-02-07 PROCEDURE — 3008F PR BODY MASS INDEX (BMI) DOCUMENTED: ICD-10-PCS | Mod: CPTII,S$GLB,, | Performed by: NURSE PRACTITIONER

## 2019-02-07 PROCEDURE — 36415 COLL VENOUS BLD VENIPUNCTURE: CPT

## 2019-02-07 PROCEDURE — 86706 HEP B SURFACE ANTIBODY: CPT

## 2019-02-07 PROCEDURE — 3074F SYST BP LT 130 MM HG: CPT | Mod: CPTII,S$GLB,, | Performed by: INTERNAL MEDICINE

## 2019-02-07 PROCEDURE — 80053 COMPREHEN METABOLIC PANEL: CPT

## 2019-02-07 PROCEDURE — 86038 ANTINUCLEAR ANTIBODIES: CPT

## 2019-02-07 PROCEDURE — 99999 PR PBB SHADOW E&M-EST. PATIENT-LVL IV: CPT | Mod: PBBFAC,,, | Performed by: INTERNAL MEDICINE

## 2019-02-07 PROCEDURE — 99499 UNLISTED E&M SERVICE: CPT | Mod: S$GLB,,, | Performed by: NURSE PRACTITIONER

## 2019-02-07 PROCEDURE — 99203 OFFICE O/P NEW LOW 30 MIN: CPT | Mod: S$GLB,,, | Performed by: INTERNAL MEDICINE

## 2019-02-07 PROCEDURE — 86704 HEP B CORE ANTIBODY TOTAL: CPT

## 2019-02-07 RX ORDER — RAMIPRIL 5 MG/1
5 CAPSULE ORAL DAILY
Qty: 30 CAPSULE | Refills: 11
Start: 2019-02-07 | End: 2019-02-27

## 2019-02-07 NOTE — TELEPHONE ENCOUNTER
----- Message from Carine Dunbar MA sent at 2/7/2019  3:01 PM CST -----  Contact: Andrew [phone]  Please call Andrew from [facility] he need to talk  to you about  compression stocking the patient legs are swollen.  The patient was  Seen today Dr. Carney.  Please call [phone]. Thank you.

## 2019-02-07 NOTE — LETTER
February 7, 2019      Alfonso Mahmood MD  [address]  [place] [address]           [facility]  [address]  [facility] [address]  Phone: [phone]  Fax: [phone]          Patient: Jian Arrieta   MR Number: [id number]   YOB: 1955   Date of Visit: 2/7/2019       Dear Dr. Alfonso Mahmood:    Thank you for referring Jian Arrieta to me for evaluation. Attached you will find relevant portions of my assessment and plan of care.    If you have questions, please do not hesitate to call me. I look forward to following Jian Arrieta along with you.    Sincerely,    Aleshia Almonte, NP    Enclosure  CC:  No Recipients    If you would like to receive this communication electronically, please contact [email or web address] or [phone] to request more information on [facility] access.    For providers and/or their staff who would like to refer a patient to [facility], please contact us through our one-stop-shop provider referral line, [facility] , at [phone].    If you feel you have received this communication in error or would no longer like to receive these types of communications, please e-mail [email or web address]

## 2019-02-07 NOTE — PATIENT INSTRUCTIONS
1. Call [phone] to see if you can get a sooner primary care doctor appt   2. Follow with cardiology   3. I will discuss getting an upper GI scope with Dr. Hall (in case he also wants to do a procedure for your pancreas)  4. Labs today after cardiology appt   5. Follow up with me next week    Because you have cirrhosis, it is important to attend clinic visits every 6 months with an Ultrasound and blood tests every 6 months to screen for liver cancer (you are at risk of developing liver cancer due to scar tissue in the liver)    Signs and symptoms of worsening liver disease include jaundice, fluid in the belly (ascites), and confusion/disorientation/slowed thought processes due to hepatic encephalopathy (toxins building up because of liver problems).   You should seek medical attention if any of these things occur.    Also, possible bleeding from esophageal varices (blood vessels in the stomach and foodpipe can burst and cause fatal bleeding).  Therefore, if you have symptoms of vomiting blood, blood in your stool, dark or black stools or vomiting coffee ground vomit, YOU SHOULD GO TO THE EMERGENCY ROOM IMMEDIATELY.     Cirrhosis can increase the risk of liver cancer, liver failure, and death. However, we will watch your liver function score (MELD score) closely with each clinic visit. A normal MELD score is 6, highest is 40. Your last one was an 7. We will check this with every clinic visit.     Cirrhosis Counseling  - NO alcohol use (includes beer, wine, and/or liquor)  - avoid non-steroidal anti-inflammatory drugs (NSAIDs) such as ibuprofen, Motrin, naprosyn, Alleve due to the risk of kidney damage  - can take acetaminophen (Tylenol), no more than 2000 mg per day  - low sodium (salt) 2 gram per day diet  - high protein diet: 160 grams per day to prevent muscle mass loss. Drink at least 1 protein shake daily (Premier Protein is best option because it is very high protein and low sugar). Ok to use this as  nighttime snack to fit it in   - resistance exercises for muscle strength  - avoid raw seafoods due to the risk of fatal Vibrio vulnificus infection  - ultrasound of the liver every 6 months for liver cancer screening (you are at risk of developing liver cancer due to scar tissue in the liver)  - Upper endoscopy every 1-2 years to screen for varices in the stomach and foodpipe which can burst and cause fatal bleeding

## 2019-02-07 NOTE — PROGRESS NOTES
I have personally performed a face to face diagnostic evaluation on this patient. I have reviewed and agree with today's findings and the care plan outlined by Aleshia Almonte NP with following comments:    Mr. Arrieta is a very pleasant 63-year-old gentleman who is referred for newly diagnosed ascites and imaging finding of nodular liver. He has risk factors for both MARIE and HUGH, reported alcohol use disorder in the past. 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 Ascitic fluid analysis was not consistent with portal hypertension. He has normal platelet count and normal spleen size. He could have alcohol liver disease and fibrosis however I doubt that he has clinically significant portal hypertension. Nodular liver on imaging can be due to congestive hepatopathy. His chronic viral hepatitis panel was negative. He is not a liver transplant candidate due to low left ventricular ejection fraction. I recommend transjugular liver biopsy with hepatic venous pressure measurement to establish/confirm the diagnosis of cirrhosis.     The patient will return to Aleshia Almonte NP  for follow-up.     Jose Christensen MD   Hepatology  [facility]

## 2019-02-07 NOTE — LETTER
February 7, 2019      Alfonso Mahmood MD  [address]  [place] [address]           [facility] - Cardiology  [address]  [facility] [address]  Phone: [phone]          Patient: Jian Arrieta   MR Number: [id number]   YOB: 1955   Date of Visit: 2/7/2019       Dear Dr. Alfonso Mahmood:    Thank you for referring Jian Arrieta to me for evaluation. Attached you will find relevant portions of my assessment and plan of care.    If you have questions, please do not hesitate to call me. I look forward to following Jian Arrieta along with you.    Sincerely,    Jaime Carney III, MD    Enclosure  CC:  No Recipients    If you would like to receive this communication electronically, please contact [email or web address] or [phone] to request more information on [facility] access.    For providers and/or their staff who would like to refer a patient to [facility], please contact us through our one-stop-shop provider referral line, [facility], at [phone].    If you feel you have received this communication in error or would no longer like to receive these types of communications, please e-mail [email or web address]

## 2019-02-07 NOTE — Clinical Note
Hi Dr. Hall,I saw that you have been following Mr. Arrieta for his pancreatic pseudocyst and had plans for imaging of his pancreas on the day that he was admitted to the hospital. Just wanted to update you in case you had to re-arrange any plans from a pancreas standpoint. ThanksANDREW Villa, REINIERP-CHepatology and Liver Transplant Nurse PractitionerOchs[facility] Medical Center - [facility]wy[facility] [place]

## 2019-02-07 NOTE — TELEPHONE ENCOUNTER
Guillaume Pacheco, nurse with [facility] is calling.  Says that he is admitting the pt to [facility] today and would like to know if you would want the pt to have compression hose or Unna boots to help with his edema. Please advise. Thanks, Ingrid

## 2019-02-08 ENCOUNTER — TELEPHONE (OUTPATIENT)
Dept: GASTROENTEROLOGY | Facility: CLINIC | Age: 64
End: 2019-02-08

## 2019-02-08 ENCOUNTER — TELEPHONE (OUTPATIENT)
Dept: HEPATOLOGY | Facility: CLINIC | Age: 64
End: 2019-02-08

## 2019-02-08 LAB
ANA SER QL IF: NORMAL
HBV CORE AB SERPL QL IA: NEGATIVE
HBV SURFACE AB SER-ACNC: NEGATIVE M[IU]/ML
HEPATITIS A ANTIBODY, IGG: POSITIVE
MITOCHONDRIA AB TITR SER IF: NORMAL {TITER}
SMOOTH MUSCLE AB TITR SER IF: NORMAL {TITER}

## 2019-02-08 NOTE — TELEPHONE ENCOUNTER
----- Message from Chris Hall MD sent at 2/8/2019  9:48 AM CST -----  Probably, Lacy can you set up a follow up clinic visit for Mr CAMPBELL in a few weeks  ----- Message -----  From: Aleshia Almonte NP  Sent: 2/8/2019   9:45 AM  To: Chris Hall MD    Ok great thanks.  Does he need any chronic f/u?  He is trying to transfer all care to [facility] chronically    Thanks  Aleshia  ----- Message -----  From: Chris Hall MD  Sent: 2/8/2019   8:05 AM  To: Aleshia Almonte NP    He got reimaged while he was in the hospital.. Things are stable as far as his necrosis.      ----- Message -----  From: Aleshia Almonte NP  Sent: 2/7/2019   5:24 PM  To: Chris Hall MD    Hi Dr. Hall,  I saw that you have been following Mr. Arrieta for his pancreatic pseudocyst and had plans for imaging of his pancreas on the day that he was admitted to the hospital. Just wanted to update you in case you had to re-arrange any plans from a pancreas standpoint.     Thanks  ANDREW Villa, FNP-C  Hepatology and Liver Transplant Nurse Practitioner  [facility]  [facility] [place]

## 2019-02-08 NOTE — TELEPHONE ENCOUNTER
Please contact pt, schedule f/u visit with me in 1-2 weeks to discuss lab results and possible biopsy    Thanks

## 2019-02-11 ENCOUNTER — TELEPHONE (OUTPATIENT)
Dept: ENDOSCOPY | Facility: HOSPITAL | Age: 64
End: 2019-02-11

## 2019-02-12 NOTE — TELEPHONE ENCOUNTER
MA called patient to schedule his follow up visit he is unable to reached left him  to please give us a callback.

## 2019-02-14 ENCOUNTER — TELEPHONE (OUTPATIENT)
Dept: HEPATOLOGY | Facility: CLINIC | Age: 64
End: 2019-02-14

## 2019-02-14 DIAGNOSIS — K74.69 OTHER CIRRHOSIS OF LIVER: Primary | ICD-10-CM

## 2019-02-14 DIAGNOSIS — Z23 NEED FOR HEPATITIS B VACCINATION: ICD-10-CM

## 2019-02-14 NOTE — TELEPHONE ENCOUNTER
Called pt to discuss lab results. Recommend Hep B, sent to [facility] pharmacy. Pt verbalized understanding    Also discussed performing liver biopsy during EUS, will await EUS and liver biopsy to be completed and will schedule f/u visit after that to discuss biopsy results. Pt instructed to notify me when his EUS is scheduled

## 2019-02-15 ENCOUNTER — TELEPHONE (OUTPATIENT)
Dept: INTERNAL MEDICINE | Facility: CLINIC | Age: 64
End: 2019-02-15

## 2019-02-15 ENCOUNTER — TELEPHONE (OUTPATIENT)
Dept: HEPATOLOGY | Facility: CLINIC | Age: 64
End: 2019-02-15

## 2019-02-15 DIAGNOSIS — Z23 NEED FOR HEPATITIS B VACCINATION: Primary | ICD-10-CM

## 2019-02-15 NOTE — TELEPHONE ENCOUNTER
Please advise     ----- Message from Martina Downey sent at 2/15/2019  9:30 AM CST -----  Contact: [facility]/[phone]  Type:Home Health(orders,updates,clarifications,etc)    Home Health Agency/Nurse:[facility]    Phone number: [phone]    Reason for call:    Comments: patient has develop new wound lower extremity. Okay to apply a foam order dressing and change it every 2-3 days.

## 2019-02-15 NOTE — TELEPHONE ENCOUNTER
Please notify pt that Hep B vaccine not covered in pharmacy. Please schedule appt in [facility] clinic to start Hep B vaccine. Orders in     Thanks

## 2019-02-15 NOTE — TELEPHONE ENCOUNTER
----- Message from Lindy Santana, PharmD sent at 2/14/2019 11:05 AM CST -----  Regarding: Hep B vaccine  Hi,     Just wanted you to know the Hep B vaccine is not covered by the patients prescription insurance. He must get this in the clinic so they can bill through medical.    Thanks, Lindy Santana, Pharm D

## 2019-02-19 ENCOUNTER — TELEPHONE (OUTPATIENT)
Dept: CARDIOLOGY | Facility: CLINIC | Age: 64
End: 2019-02-19

## 2019-02-19 NOTE — TELEPHONE ENCOUNTER
----- Message from Ciarra Lockett sent at 2/19/2019  1:57 PM CST -----  Contact: Deyanira [phone] with [facility]  wound care  Deyanira says pt has 3+ to 4+ edema, and a wound on his right leg. She would like to wrap both legs with UNNA, but she would like your approval.  LOV 2/7/19 Dr. Carney    Thanks

## 2019-02-19 NOTE — TELEPHONE ENCOUNTER
"Instructed Deyanira as ordered by  that on 2/7/19 "He can have compression, but not the pneumatic devices, since his EF is newly down to 30%. We can try the brinda boots and see how things go. "Deyanira verbalized understanding of these instructions.     "

## 2019-02-23 ENCOUNTER — HOSPITAL ENCOUNTER (EMERGENCY)
Facility: HOSPITAL | Age: 64
Discharge: HOME | End: 2019-02-23
Attending: EMERGENCY MEDICINE
Payer: OTHER MISCELLANEOUS

## 2019-02-23 ENCOUNTER — TELEPHONE (OUTPATIENT)
Dept: INTERNAL MEDICINE | Facility: CLINIC | Age: 64
End: 2019-02-23

## 2019-02-23 ENCOUNTER — TELEPHONE (OUTPATIENT)
Dept: FAMILY MEDICINE | Facility: CLINIC | Age: 64
End: 2019-02-23

## 2019-02-23 VITALS
BODY MASS INDEX: 49.44 KG/M2 | SYSTOLIC BLOOD PRESSURE: 119 MMHG | DIASTOLIC BLOOD PRESSURE: 62 MMHG | OXYGEN SATURATION: 100 % | HEIGHT: 67 IN | HEART RATE: 84 BPM | RESPIRATION RATE: 21 BRPM | WEIGHT: 315 LBS | TEMPERATURE: 98 F

## 2019-02-23 DIAGNOSIS — R06.00 DYSPNEA: ICD-10-CM

## 2019-02-23 DIAGNOSIS — E66.01 MORBID OBESITY: ICD-10-CM

## 2019-02-23 DIAGNOSIS — K70.30 ALCOHOLIC CIRRHOSIS OF LIVER WITHOUT ASCITES: ICD-10-CM

## 2019-02-23 DIAGNOSIS — R79.89 CREATININE ELEVATION: Primary | ICD-10-CM

## 2019-02-23 DIAGNOSIS — F10.10 ALCOHOL ABUSE: ICD-10-CM

## 2019-02-23 LAB
ALBUMIN SERPL BCP-MCNC: 1.8 G/DL
ALP SERPL-CCNC: 147 U/L
ALT SERPL W/O P-5'-P-CCNC: 19 U/L
ANION GAP SERPL CALC-SCNC: 6 MMOL/L
AST SERPL-CCNC: 19 U/L
BASOPHILS # BLD AUTO: 0.06 K/UL
BASOPHILS NFR BLD: 0.6 %
BILIRUB SERPL-MCNC: 0.4 MG/DL
BNP SERPL-MCNC: 88 PG/ML
BUN SERPL-MCNC: 32 MG/DL
CALCIUM SERPL-MCNC: 8 MG/DL
CHLORIDE SERPL-SCNC: 105 MMOL/L
CO2 SERPL-SCNC: 24 MMOL/L
CREAT SERPL-MCNC: 1.7 MG/DL
DIFFERENTIAL METHOD: ABNORMAL
EOSINOPHIL # BLD AUTO: 0.1 K/UL
EOSINOPHIL NFR BLD: 0.7 %
ERYTHROCYTE [DISTWIDTH] IN BLOOD BY AUTOMATED COUNT: 13.8 %
EST. GFR  (AFRICAN AMERICAN): 48.2 ML/MIN/1.73 M^2
EST. GFR  (NON AFRICAN AMERICAN): 41.7 ML/MIN/1.73 M^2
GLUCOSE SERPL-MCNC: 93 MG/DL
HCT VFR BLD AUTO: 29.8 %
HGB BLD-MCNC: 9.6 G/DL
IMM GRANULOCYTES # BLD AUTO: 0.05 K/UL
IMM GRANULOCYTES NFR BLD AUTO: 0.5 %
LYMPHOCYTES # BLD AUTO: 2.5 K/UL
LYMPHOCYTES NFR BLD: 25.6 %
MAGNESIUM SERPL-MCNC: 1.7 MG/DL
MCH RBC QN AUTO: 29.4 PG
MCHC RBC AUTO-ENTMCNC: 32.2 G/DL
MCV RBC AUTO: 91 FL
MONOCYTES # BLD AUTO: 1 K/UL
MONOCYTES NFR BLD: 9.9 %
NEUTROPHILS # BLD AUTO: 6 K/UL
NEUTROPHILS NFR BLD: 62.7 %
NRBC BLD-RTO: 0 /100 WBC
PLATELET # BLD AUTO: 440 K/UL
PMV BLD AUTO: 9.3 FL
POTASSIUM SERPL-SCNC: 4.7 MMOL/L
PROT SERPL-MCNC: 6.1 G/DL
RBC # BLD AUTO: 3.26 M/UL
SODIUM SERPL-SCNC: 135 MMOL/L
TROPONIN I SERPL DL<=0.01 NG/ML-MCNC: <0.006 NG/ML
WBC # BLD AUTO: 9.64 K/UL

## 2019-02-23 PROCEDURE — 99285 EMERGENCY DEPT VISIT HI MDM: CPT

## 2019-02-23 PROCEDURE — 93010 EKG 12-LEAD: ICD-10-PCS | Mod: ,,, | Performed by: INTERNAL MEDICINE

## 2019-02-23 PROCEDURE — 83735 ASSAY OF MAGNESIUM: CPT

## 2019-02-23 PROCEDURE — 83880 ASSAY OF NATRIURETIC PEPTIDE: CPT

## 2019-02-23 PROCEDURE — 25000003 PHARM REV CODE 250: Performed by: EMERGENCY MEDICINE

## 2019-02-23 PROCEDURE — 80053 COMPREHEN METABOLIC PANEL: CPT

## 2019-02-23 PROCEDURE — 99285 PR EMERGENCY DEPT VISIT,LEVEL V: ICD-10-PCS | Mod: ,,, | Performed by: EMERGENCY MEDICINE

## 2019-02-23 PROCEDURE — 93005 ELECTROCARDIOGRAM TRACING: CPT

## 2019-02-23 PROCEDURE — 85025 COMPLETE CBC W/AUTO DIFF WBC: CPT

## 2019-02-23 PROCEDURE — 84484 ASSAY OF TROPONIN QUANT: CPT

## 2019-02-23 PROCEDURE — 99285 EMERGENCY DEPT VISIT HI MDM: CPT | Mod: ,,, | Performed by: EMERGENCY MEDICINE

## 2019-02-23 PROCEDURE — 93010 ELECTROCARDIOGRAM REPORT: CPT | Mod: ,,, | Performed by: INTERNAL MEDICINE

## 2019-02-23 RX ADMIN — SODIUM CHLORIDE, SODIUM LACTATE, POTASSIUM CHLORIDE, AND CALCIUM CHLORIDE 500 ML: .6; .31; .03; .02 INJECTION, SOLUTION INTRAVENOUS at 07:02

## 2019-02-23 NOTE — TELEPHONE ENCOUNTER
----- Message from Fabiana Roth sent at 2/23/2019 10:20 AM CST -----  Contact: patient [phone]  [facility] nurse Yuly called to report patient's bp in left arem is 84/60 and r arm is 86/56  And asked to speak with a doctor. Call forwarded to .

## 2019-02-23 NOTE — TELEPHONE ENCOUNTER
[facility] calling, she is with patient.    Pt's bp 84/60 LUE, 86/56 RUE,   Pt took all meds this AM including bp meds.  He's not sure if his bp was low before taking his meds.  Pt reports feeling tired and not like himself.    Advised pt to go to the ER.  Pt has systolic HF with EF 30%, currently hypotensive, just took all his antihypertensive medications and symptomatic.

## 2019-02-24 NOTE — ED NOTES
.  Patient identifiers for Jian Arrieta 63 y.o. male checked and correct.  Chief Complaint   Patient presents with    Hypotension     BP low when checked by [facility] nurse.      Past Medical History:   Diagnosis Date    Alcohol abuse     Anasarca 1/28/2019    Arthropathy associated with neurological disorder 9/2/2015    Atherosclerosis     Charcot foot due to diabetes mellitus     Chronic combined systolic and diastolic heart failure 01/29/2019 1-28-19 Left VentricleModerate decreased ejection fraction at 30%. Normal left ventricular cavity size. Normal wall thickness observed. Grade I (mild) left ventricular diastolic dysfunction consistent with impaired relaxation. Normal left atrial pressure. Moderate, global hypokinesis(see wall scoring diagram). Right VentricleNormal cavity size, wall thickness and ejection fraction. Wall motion n    Chronic pancreatitis 1/28/2019    Cirrhosis of liver with ascites 1/28/2019    Colon polyps     approx 5 yrs ago    Coronary artery disease due to calcified coronary lesion 05/08/2015    5 stents on ASA      Diabetic polyneuropathy associated with type 2 diabetes mellitus 9/2/2015    Diverticulosis 1/28/2019    DM type 2 with diabetic peripheral neuropathy 2/4/2019    Essential hypertension 1/28/2019    Former smoker 8/26/2015    Healed ulcer of left foot on examination 6/20/2017    Hydrocele     approx 1.5 yrs ago    Hypoalbuminemia 2/4/2019    Lymphedema of both lower extremities 1/29/2019    Mixed hyperlipidemia 5/8/2015    Morbid obesity with BMI of 50.0-59.9, adult 5/8/2015    Onychomycosis of multiple toenails with type 2 diabetes mellitus and peripheral neuropathy 6/20/2017    Other cirrhosis of liver     1-28-19 Liver has a cirrhotic morphology with no focal lesions.  Significant interval increase in ascites when compared to prior exam which may account for patient's abdominal distension.  Hypodense air-fluid collection along the body of the  pancreas which is slightly smaller when compared to prior CT.  Findings may relate to pancreatic necrosis with pancreatic pseudocysts with infected pseudocyst    Perianal cyst     approx 2 yrs ago    Pseudocyst of pancreas 1/28/2019 1-28-19 Liver has a cirrhotic morphology with no focal lesions.  Significant interval increase in ascites when compared to prior exam which may account for patient's abdominal distension.  Hypodense air-fluid collection along the body of the pancreas which is slightly smaller when compared to prior CT.  Findings may relate to pancreatic necrosis with pancreatic pseudocysts with infected pseudocyst    Skin cancer     skin cancer    Sleep apnea 8/26/2015    Status post bariatric surgery 1/11/2016    Type 2 diabetes mellitus, with long-term current use of insulin 5/8/2015     Allergies reported: Review of patient's allergies indicates:  No Known Allergies      LOC: Patient is awake, alert, and aware of environment with an appropriate affect. Patient is oriented x 3 and speaking appropriately.  APPEARANCE: Patient resting comfortably and in no acute distress. Patient is clean and well groomed, patient's clothing is properly fastened.  SKIN: The skin is warm and dry. Patient has normal skin turgor and moist mucus membranes. Skin is intact; no bruising or breakdown noted.  MUSKULOSKELETAL: Patient is moving all extremities well, no obvious deformities noted. Pulses intact.   RESPIRATORY: Airway is open and patent. Respirations are spontaneous and non-labored with normal effort and rate, BBS= diminished.   CARDIAC: Patient has a normal rate and rhythm. NSR on cardiac monitor, bilateral lower extremity edema, pt has legs wrapped from home health care.   ABDOMEN: Abdominal distention noted. Bowel sounds active in all 4 quadrants. Soft and non-tender upon palpation.  NEUROLOGICAL:  PERRL. Facial expression is symmetrical. Hand grasps are equal bilaterally. Normal sensation in all  extremities when touched with finger.

## 2019-02-24 NOTE — ED TRIAGE NOTES
Pt presents with c/o not feeling well this am, home health reported to pt that he was hypotensive. Pt denies pain.

## 2019-02-24 NOTE — ED PROVIDER NOTES
"Encounter Date: 2/23/2019       History     Chief Complaint   Patient presents with    Hypotension     BP low when checked by [facility] nurse.      HPI   64 yo male with hx of morbid obesity, HF with EF 30%, chronic pancreatitis, cirrhosis, CAD s/p CABG, DM who presents for evaluation of hypotension identified by home health this morning. Pt states he was in his usual state of health until this morning when he "felt bad." He states he felt fatigued and "winded after walking only 10 feet." He states this is not typical for him. This was shortly after taking his morning meds. He denies chest pain, syncope, lightheadedness, abd pain, n/v/d. Per HH, BP was 80s/50s-60s. Patient reports feeling better now and feels at baseline. He is currently symptom-free. He complains of his wife telling providers that his pancreatitis and cirrhosis is from heavy drinking and that he is not a heavy drinker and hasn't had a drink since September.     Review of patient's allergies indicates:  No Known Allergies  Past Medical History:   Diagnosis Date    Alcohol abuse     Anasarca 1/28/2019    Arthropathy associated with neurological disorder 9/2/2015    Atherosclerosis     Charcot foot due to diabetes mellitus     Chronic combined systolic and diastolic heart failure 01/29/2019 1-28-19 Left VentricleModerate decreased ejection fraction at 30%. Normal left ventricular cavity size. Normal wall thickness observed. Grade I (mild) left ventricular diastolic dysfunction consistent with impaired relaxation. Normal left atrial pressure. Moderate, global hypokinesis(see wall scoring diagram). Right VentricleNormal cavity size, wall thickness and ejection fraction. Wall motion n    Chronic pancreatitis 1/28/2019    Cirrhosis of liver with ascites 1/28/2019    Colon polyps     approx 5 yrs ago    Coronary artery disease due to calcified coronary lesion 05/08/2015    5 stents on ASA      Diabetic polyneuropathy associated with type " 2 diabetes mellitus 9/2/2015    Diverticulosis 1/28/2019    DM type 2 with diabetic peripheral neuropathy 2/4/2019    Essential hypertension 1/28/2019    Former smoker 8/26/2015    Healed ulcer of left foot on examination 6/20/2017    Hydrocele     approx 1.5 yrs ago    Hypoalbuminemia 2/4/2019    Lymphedema of both lower extremities 1/29/2019    Mixed hyperlipidemia 5/8/2015    Morbid obesity with BMI of 50.0-59.9, adult 5/8/2015    Onychomycosis of multiple toenails with type 2 diabetes mellitus and peripheral neuropathy 6/20/2017    Other cirrhosis of liver     1-28-19 Liver has a cirrhotic morphology with no focal lesions.  Significant interval increase in ascites when compared to prior exam which may account for patient's abdominal distension.  Hypodense air-fluid collection along the body of the pancreas which is slightly smaller when compared to prior CT.  Findings may relate to pancreatic necrosis with pancreatic pseudocysts with infected pseudocyst    Perianal cyst     approx 2 yrs ago    Pseudocyst of pancreas 1/28/2019 1-28-19 Liver has a cirrhotic morphology with no focal lesions.  Significant interval increase in ascites when compared to prior exam which may account for patient's abdominal distension.  Hypodense air-fluid collection along the body of the pancreas which is slightly smaller when compared to prior CT.  Findings may relate to pancreatic necrosis with pancreatic pseudocysts with infected pseudocyst    Skin cancer     skin cancer    Sleep apnea 8/26/2015    Status post bariatric surgery 1/11/2016    Type 2 diabetes mellitus, with long-term current use of insulin 5/8/2015     Past Surgical History:   Procedure Laterality Date    ANGIOPLASTY      total x5 stents    COLONOSCOPY N/A 10/6/2015    [facility] ([facility]ND [facility])    CORONARY ARTERY BYPASS GRAFT  2017    x3    CYST REMOVAL      GASTRECTOMY      GASTRECTOMY-SLEEVE-LAPAROSCOPIC - 01121 N/A  2015    Performed by Micheal Villavicencio Jr., MD at [facility] F[facility]    KNEE ARTHROSCOPY      perianal surgery      perianal cyst removed     Family History   Problem Relation Age of Onset    Cancer Mother     Cancer Father     Heart disease Father     Obesity Sister     Parkinsonism Brother     No Known Problems Paternal Grandmother     Cancer Paternal Grandfather     Cancer Brother     Diabetes Maternal Grandmother     Stroke Maternal Grandfather     Cirrhosis Neg Hx      Social History     Tobacco Use    Smoking status: Former Smoker     Packs/day: 2.00     Years: 30.00     Pack years: 60.00     Types: Cigarettes     Last attempt to quit: 2005     Years since quittin.0    Smokeless tobacco: Never Used   Substance Use Topics    Alcohol use: No     Comment: started ~, reports 1 shot daily, max 3 shots daily, vague about alcohol consumption. Last drink 2018    Drug use: No     Review of Systems   Constitutional: Negative for chills and fever.   HENT: Negative for congestion and sore throat.    Eyes: Negative for photophobia and visual disturbance.   Respiratory: Positive for shortness of breath. Negative for cough.    Cardiovascular: Positive for leg swelling. Negative for chest pain.   Gastrointestinal: Negative for nausea and vomiting.   Endocrine: Negative for polyuria.   Genitourinary: Negative for decreased urine volume and dysuria.   Musculoskeletal: Negative for back pain and neck stiffness.   Skin: Negative for rash and wound.   Neurological: Negative for syncope and weakness.   Hematological: Does not bruise/bleed easily.   Psychiatric/Behavioral: Negative for confusion and decreased concentration.       Physical Exam     Initial Vitals [19 1558]   BP Pulse Resp Temp SpO2   (!) 108/55 90 16 98 °F (36.7 °C) 99 %      MAP       --         Physical Exam    Nursing note and vitals reviewed.  Constitutional: He appears well-nourished. He is not diaphoretic. No distress.    Morbidly obese, resting comfortably   HENT:   Head: Normocephalic and atraumatic.   Eyes: Conjunctivae and EOM are normal. Right eye exhibits no discharge. Left eye exhibits no discharge. No scleral icterus.   Neck: Normal range of motion. Neck supple.   Cardiovascular: Normal rate, regular rhythm, normal heart sounds and intact distal pulses. Exam reveals no gallop and no friction rub.    No murmur heard.  Pulmonary/Chest: Breath sounds normal. No respiratory distress. He has no wheezes. He has no rhonchi. He has no rales. He exhibits no tenderness.   Abdominal: Soft. Bowel sounds are normal. He exhibits no distension. There is no tenderness. There is no rebound and no guarding.   Musculoskeletal: He exhibits edema (4+ pitting BLE edema, symmetric).   Neurological: He is alert and oriented to person, place, and time. GCS score is 15. GCS eye subscore is 4. GCS verbal subscore is 5. GCS motor subscore is 6.   Skin: Skin is warm and dry. Capillary refill takes less than 2 seconds. No erythema.   Psychiatric: He has a normal mood and affect.         ED Course   Procedures  Labs Reviewed   CBC W/ AUTO DIFFERENTIAL - Abnormal; Notable for the following components:       Result Value    RBC 3.26 (*)     Hemoglobin 9.6 (*)     Hematocrit 29.8 (*)     Platelets 440 (*)     Immature Grans (Abs) 0.05 (*)     All other components within normal limits   COMPREHENSIVE METABOLIC PANEL - Abnormal; Notable for the following components:    Sodium 135 (*)     BUN, Bld 32 (*)     Creatinine 1.7 (*)     Calcium 8.0 (*)     Albumin 1.8 (*)     Alkaline Phosphatase 147 (*)     Anion Gap 6 (*)     eGFR if  48.2 (*)     eGFR if non  41.7 (*)     All other components within normal limits   MAGNESIUM   TROPONIN I   B-TYPE NATRIURETIC PEPTIDE     EKG Readings: (Independently Interpreted)   Initial Reading: No STEMI. Rhythm: Normal Sinus Rhythm. Heart Rate: 84. Ectopy: No Ectopy. Conduction: Normal. ST  "Segments: Normal ST Segments. T Waves: Normal. Other Impression: Poor R wave progression     ECG Results          EKG 12-lead (In process)  Result time 02/24/19 10:26:32    In process by Interface, Lab In [facility] (02/24/19 10:26:32)                 Narrative:    Test Reason : (Not Selected)    Vent. Rate : 084 BPM     Atrial Rate : 084 BPM     P-R Int : 144 ms          QRS Dur : 086 ms      QT Int : 380 ms       P-R-T Axes : 038 006 063 degrees     QTc Int : 449 ms    Normal sinus rhythm  Possible Anterolateral infarct (cited on or before 23-FEB-2019)  Abnormal ECG  When compared with ECG of 28-JAN-2019 08:45,  No significant change was found    Referred By: [email or web address]   SELF           Confirmed By:                             Imaging Results          X-Ray Chest PA And Lateral (Final result)  Result time 02/23/19 19:17:46    Final result by Keenan López MD (02/23/19 19:17:46)                 Impression:      No acute cardiopulmonary finding.      Electronically signed by: Keenan López MD  Date:    02/23/2019  Time:    19:17             Narrative:    EXAMINATION:  XR CHEST PA AND LATERAL    CLINICAL HISTORY:  Provided history is "  Dyspnea, unspecified".    TECHNIQUE:  Frontal and lateral views of the chest were performed.    COMPARISON:  01/28/2019.    FINDINGS:  Cardiac silhouette is not enlarged. Postoperative changes of prior median sternotomy.  No focal consolidation.  No sizable pleural effusion.  No pneumothorax.  No detrimental change.                                 Medical Decision Making:   History:   Old Medical Records: I decided to obtain old medical records.  Old Records Summarized: records from clinic visits and records from previous admission(s).  Initial Assessment:   See HPI  Differential Diagnosis:   Hypovolemia, BP med side effect, anemia, dehydration, electrolyte derangement, Metabolic derangemant, arrhythmia, HF exacerbation, atypical ACS  Independently Interpreted Test(s):   I have " ordered and independently interpreted X-rays - see prior notes.  I have ordered and independently interpreted EKG Reading(s) - see prior notes  Clinical Tests:   Lab Tests: Ordered  Radiological Study: Ordered  Medical Tests: Ordered  ED Management:  POCUS with moderate EF. No pericardial effusion. Limited study due to habitus. Unable to visualize IVC. Moderate to large volume ascites noted.   500cc IVF bolus noted.     W/u notable for anemia but at baseline. CMP with mild elevation of BUN 32, Cr 1.7. Patient was given IVF as noted above. BP improved and currently 120s/70s. BNP wnl. Will discharge patient with instructions to follow up for repeat labs on Monday, 2/25 to check kidney function. Return precautions discussed.    Micheal Conklin, PGY-3  8:19 PM                Attending Attestation:   Physician Attestation Statement for Resident:  As the supervising MD   Physician Attestation Statement: I have personally seen and examined this patient.   I agree with the above history. -:   As the supervising MD I agree with the above PE.    As the supervising MD I agree with the above treatment, course, plan, and disposition.            I have reviewed and concur with the resident's history, physical, assessment, and plan.  I have personally interviewed and examined the patient at bedside.  See below addendum for my evaluation and additional findings.    Briefly,  this is a 63 y.o.male with a history of morbid obesity, heart failure, chronic pancreatitis, cirrhosis, CAD status post CABG, diabetes presenting with 1 episode of low blood pressure measured by home health this a.m..  History of intermittent shortness of breath, but improved.  Given symptoms and reported low blood pressure, a broad workup was conducted.  Chest x-ray without any acute findings, ECG without ischemia, telemetry monitoring without any arrhythmias or ischemia.  Laboratory evaluation with mild increase in creatinine, likely dehydration, will replete with  IV fluids and have patient recheck kidney function on Monday.  History of cirrhosis and alcohol abuse.  Will continue to encourage p.o. fluids and close follow-up with PCP.  Doubt ACS, PE, sepsis or acute pathology. Patient agreeable to discharge plan. Strict ED precautions and return instructions discussed at length and patient verbalized understanding. All questions were answered and ample time was given for questions.      Complexity:  High - level 5      Taras Torres DO    Dept of Emergency Medicine   [facility]  Spectralink: [id number]               Clinical Impression:       ICD-10-CM ICD-9-CM   1. Creatinine elevation R79.89 790.99   2. Dyspnea R06.00 786.09   3. Morbid obesity E66.01 278.01   4. Alcohol abuse F10.10 305.00   5. Alcoholic cirrhosis of liver without ascites K70.30 571.2         Disposition:   Disposition: Discharged  Condition: Stable                        Micheal Conklin MD  Resident  02/23/19 2019       Taras Torres DO  02/24/19 1152

## 2019-02-25 ENCOUNTER — TELEPHONE (OUTPATIENT)
Dept: ADMINISTRATIVE | Facility: CLINIC | Age: 64
End: 2019-02-25

## 2019-02-25 NOTE — PROGRESS NOTES
Cardiology Clinic Note  Reason for Visit: cardiomyopathy    HPI:     Jian Arrieta is a 63 y.o. M with CAD s/p PCI, HLD, DM2, EtOH cirrhosis, BERHANE, who presents for follow up for cardiomyopathy.    He was admitted to [facility] [place] [facility] from 1/28/19-1/30/19 for L upper abdominal pain, distention, and bilateral lower extremity edema. While hospitalized, he was diuresed with lasix 20mg IV with good urine output. CT abd/pelv revealed cirrhosis and anasarca, including ascites. TTE showed LVEF 30%. Paracentesis performed with 5L removed without evidence of SBP. He was started on medical therapy for low EF and volume control.    Since our last visit, he has experienced several episodes of low blood pressure. He presented to the ED on 2/23 due to BP 70/40s. He reports having frequent episodes of diarrhea recently. He has not been taking creon regularly before every meal. While in the ED, his Cr was 1.7 up from baseline 1.2. No ischemic symptoms.    To note, he was previously followed by Dr Arroyo at  cardiology. He had a total of five stents placed over the last 20y. He had CABG 1.5-2y ago. He was having chest pain provoked by stressful situations prior to interventions. No history of valvular disease, arrhythmias, or heart failure/cardiomyopathy.    LDL 42 on 1/28/19     Medical: CAD s/p PCI (x5) with subsequent CABG x3v (~2017 at ), HLD, DM2, BERHANE on CPAP, R knee OA, Charcot foot, EtOH cirrhosis, normocytic anemia  Surgical: sleeve gastrectomy, knee, perianal cyst removal, CABG  Family: DM, Parkinson's, cancers, stroke  Social: former smoker of 2 PPD x30y (quit 2005)    ROS:    Constitution: Negative for fever or chills.  HENT: Negative for  headaches.  Eyes: Negative for blurred vision.   Cardiovascular: See above  Pulmonary: Negative for SOB. Negative for cough.   Gastrointestinal: Negative for nausea/vomiting.   : Negative for dysuria.   Skin: Negative for rashes.  Neurological: Negative for focal weakness.  Psychological:  Negative for depression.  PMH:     Past Medical History:   Diagnosis Date    Alcohol abuse     Anasarca 1/28/2019    Arthropathy associated with neurological disorder 9/2/2015    Atherosclerosis     Charcot foot due to diabetes mellitus     Chronic combined systolic and diastolic heart failure 01/29/2019 1-28-19 Left VentricleModerate decreased ejection fraction at 30%. Normal left ventricular cavity size. Normal wall thickness observed. Grade I (mild) left ventricular diastolic dysfunction consistent with impaired relaxation. Normal left atrial pressure. Moderate, global hypokinesis(see wall scoring diagram). Right VentricleNormal cavity size, wall thickness and ejection fraction. Wall motion n    Chronic pancreatitis 1/28/2019    Cirrhosis of liver with ascites 1/28/2019    Colon polyps     approx 5 yrs ago    Coronary artery disease due to calcified coronary lesion 05/08/2015    5 stents on ASA      Diabetic polyneuropathy associated with type 2 diabetes mellitus 9/2/2015    Diverticulosis 1/28/2019    DM type 2 with diabetic peripheral neuropathy 2/4/2019    Essential hypertension 1/28/2019    Former smoker 8/26/2015    Healed ulcer of left foot on examination 6/20/2017    Hydrocele     approx 1.5 yrs ago    Hypoalbuminemia 2/4/2019    Lymphedema of both lower extremities 1/29/2019    Mixed hyperlipidemia 5/8/2015    Morbid obesity with BMI of 50.0-59.9, adult 5/8/2015    Onychomycosis of multiple toenails with type 2 diabetes mellitus and peripheral neuropathy 6/20/2017    Other cirrhosis of liver     1-28-19 Liver has a cirrhotic morphology with no focal lesions.  Significant interval increase in ascites when compared to prior exam which may account for patient's abdominal distension.  Hypodense air-fluid collection along the body of the pancreas which is slightly smaller when compared to prior CT.  Findings may relate to pancreatic necrosis with pancreatic pseudocysts with infected  pseudocyst    Perianal cyst     approx 2 yrs ago    Pseudocyst of pancreas 1/28/2019 1-28-19 Liver has a cirrhotic morphology with no focal lesions.  Significant interval increase in ascites when compared to prior exam which may account for patient's abdominal distension.  Hypodense air-fluid collection along the body of the pancreas which is slightly smaller when compared to prior CT.  Findings may relate to pancreatic necrosis with pancreatic pseudocysts with infected pseudocyst    Skin cancer     skin cancer    Sleep apnea 8/26/2015    Status post bariatric surgery 1/11/2016    Type 2 diabetes mellitus, with long-term current use of insulin 5/8/2015     Past Surgical History:   Procedure Laterality Date    ANGIOPLASTY      total x5 stents    COLONOSCOPY N/A 10/6/2015    Performed by Shekhar Richards MD at [facility] ENDO (2ND FLR)    CORONARY ARTERY BYPASS GRAFT  2017    x3    CYST REMOVAL      GASTRECTOMY      GASTRECTOMY-SLEEVE-LAPAROSCOPIC - 27605 N/A 12/22/2015    Performed by Micheal Villavicencio Jr., MD at [facility] OR 2ND FLR    KNEE ARTHROSCOPY      perianal surgery      perianal cyst removed     Allergies:   Review of patient's allergies indicates:  No Known Allergies  Medications:     Current Outpatient Medications on File Prior to Visit   Medication Sig Dispense Refill    aspirin 325 MG tablet Take 325 mg by mouth once daily.      atorvastatin (LIPITOR) 40 MG tablet Take 1 tablet (40 mg total) by mouth once daily. 90 tablet 3    cyanocobalamin, vitamin B-12, 500 mcg Subl Place 1 tablet under the tongue every Monday.       ferrous sulfate 325 (65 FE) MG EC tablet Take 1 tablet (325 mg total) by mouth once daily.  0    furosemide (LASIX) 40 MG tablet Take 1 tablet (40 mg total) by mouth once daily. 90 tablet 3    insulin aspart U-100 (NOVOLOG) 100 unit/mL injection Inject 4 Units into the skin 3 (three) times daily before meals.      insulin detemir (LEVEMIR FLEXPEN SUBQ) Inject 12 Units into the  "skin once daily.       lipase-protease-amylase (CREON) 36,000-114,000- 180,000 unit CpDR Take 1 capsule by mouth 3 (three) times daily. 270 capsule 3    metoprolol succinate (TOPROL-XL) 100 MG 24 hr tablet Take 1 tablet (100 mg total) by mouth once daily. 90 tablet 3    ONETOUCH ULTRA TEST Strp 4 (four) times daily. Use to test blood sugar four times a day.  3    ramipril (ALTACE) 5 MG capsule Take 1 capsule (5 mg total) by mouth once daily. 30 capsule 11    spironolactone (ALDACTONE) 100 MG tablet Take 1 tablet (100 mg total) by mouth once daily. 90 tablet 3    vitamin D (VITAMIN D3) 1000 units Tab Take 1 tablet (1,000 Units total) by mouth once daily.       No current facility-administered medications on file prior to visit.      Social History:     Social History     Tobacco Use    Smoking status: Former Smoker     Packs/day: 2.00     Years: 30.00     Pack years: 60.00     Types: Cigarettes     Last attempt to quit: 2005     Years since quittin.0    Smokeless tobacco: Never Used   Substance Use Topics    Alcohol use: No     Comment: started ~, reports 1 shot daily, max 3 shots daily, vague about alcohol consumption. Last drink 2018     Family History:     Family History   Problem Relation Age of Onset    Cancer Mother     Cancer Father     Heart disease Father     Obesity Sister     Parkinsonism Brother     No Known Problems Paternal Grandmother     Cancer Paternal Grandfather     Cancer Brother     Diabetes Maternal Grandmother     Stroke Maternal Grandfather     Cirrhosis Neg Hx      Physical Exam:   BP (!) 90/51   Pulse 94   Ht 5' 7" (1.702 m)   Wt (!) 140.1 kg (308 lb 12.1 oz)   SpO2 98%   BMI 48.36 kg/m²      Constitutional: No apparent distress, conversant  HEENT: Sclera anicteric, extraocular movements intact  Neck: No jugular venous distension, no carotid bruits  CV: Regular rate and rhythm, no murmurs rubs or gallops, normal S1/S2  Pulm: Clear to auscultation " bilaterally  GI: Abdomen soft, no palpable masses  Extremities: Improved but still 1+ bilateral nonpitting lower extremity edema, warm with palpable pulses, brinda boots in place bilaterally  Skin: Multiple areas of bruising on extremities, laceration on dorsal left hand with bleeding  Psych: Alert and oriented to person place location, appropriate affect  Neuro: No focal deficits    Labs:     Blood Tests:  Lab Results   Component Value Date    BNP 88 02/23/2019     (L) 02/23/2019    K 4.7 02/23/2019     02/23/2019    CO2 24 02/23/2019    BUN 32 (H) 02/23/2019    CREATININE 1.7 (H) 02/23/2019    GLU 93 02/23/2019    HGBA1C 6.9 (H) 01/28/2019    MG 1.7 02/23/2019    AST 19 02/23/2019    ALT 19 02/23/2019    ALBUMIN 1.8 (L) 02/23/2019    PROT 6.1 02/23/2019    BILITOT 0.4 02/23/2019    WBC 9.64 02/23/2019    HGB 9.6 (L) 02/23/2019    HCT 29.8 (L) 02/23/2019    HCT 32 (L) 01/28/2019    MCV 91 02/23/2019     (H) 02/23/2019    INR 1.1 02/07/2019    TSH 2.211 01/28/2019       Lab Results   Component Value Date    CHOL 85 (L) 01/28/2019    HDL 31 (L) 01/28/2019    TRIG 56 01/28/2019       Lab Results   Component Value Date    LDLCALC 42.8 (L) 01/28/2019       Urine Tests:  Lab Results   Component Value Date    COLORU Yellow 01/28/2019    APPEARANCEUA Clear 01/28/2019    PHUR 5.0 01/28/2019    SPECGRAV 1.020 01/28/2019    PROTEINUA Negative 01/28/2019    GLUCUA Negative 01/28/2019    KETONESU Negative 01/28/2019    BILIRUBINUA Negative 01/28/2019    OCCULTUA Negative 01/28/2019    NITRITE Negative 01/28/2019    LEUKOCYTESUR Negative 01/28/2019       Imaging:     Echocardiogram  TTE 1/28/19  · Technically difficult study - BMI > 50  · Moderately decreased left ventricular systolic function. The estimated ejection fraction is 30%  · Normal right ventricular systolic function.  · Grade I (mild) left ventricular diastolic dysfunction consistent with impaired relaxation.    Stress testing  None    Cath  Lab  None    Other  None    EK19  Sinus tachycardia  Otherwise normal ECG    Assessment:     1. Cardiomyopathy, unspecified type    2. Chronic combined systolic and diastolic heart failure    3. Essential hypertension    4. Atherosclerosis    5. Coronary artery disease due to calcified coronary lesion    6. Mixed hyperlipidemia      Plan:     Cardiomyopathy  Unclear etiology at this time. With prior CABG, will perform PET stress to evaluate drop in EF.    Uable to exercise. Has resting wall motion abnormalities on echo. BMI 48. At risk for contrast nephropathy with recent ELIEZER on CKD.  Continue toprol 100mg daily.  Decrease ramipril to 2.5mg daily given recent symptomatic hypotension.  Intravascularly appears euvolemic. Continue lasix 40mg daily and spironolactone 100mg daily.  Instructed to use creon regularly to improve diarrhea and avoid dehydration.    Coronary artery disease due to calcified coronary lesion  No ischemic symptoms.   Continue ASA 81mg daily. LDL 42 due to cirrhosis. Continue atorvastatin 40mg daily.  Continue beta blocker, as above.    Essential hypertension  Controlled on beta blocker, ACEi, and spironolactone    Mixed hyperlipidemia  LDL 42 in 2019    Lymphedema  Would use caution with pneumatic compression, given LVEF of 30%.  Currently using brinda boots without issue.    Signed:  Samuel Carney MD  Cardiology     2019 12:47 PM    Follow-up:     Future Appointments   Date Time Provider Department Center   2019 11:30 AM Jaime Carney III, MD [facility] CARDIO [place]   3/14/2019  8:30 AM [facility] GASTR[facility]   3/18/2019  2:45 PM Samir Mahmood MD [facility] IM [facility]   3/18/2019  3:30 PM INJECTION, INFECTIOUS DISEASES [facility] ID [facility]   2019  1:00 PM INJECTION, INFECTIOUS DISEASES [facility]

## 2019-02-25 NOTE — TELEPHONE ENCOUNTER
Home Health SOC 02/07/2019 - 04/07/2019 with [facility] ([facility]) - Dr. Alfonso Mahmood. Patient received SN, PT, OT and MSW services.

## 2019-02-26 ENCOUNTER — TELEPHONE (OUTPATIENT)
Dept: INTERNAL MEDICINE | Facility: CLINIC | Age: 64
End: 2019-02-26

## 2019-02-26 NOTE — TELEPHONE ENCOUNTER
Pt came in today for a hospital follow up stating visit was covered under workmans comp.  staff explained to pt this particular campus does not accept workmans comp and pt became disruptive. Nursing staff was asked to get involved to assist with deescalating situation. I did speak with Mr Arrieta and reiterated what  staff told him regarding the workmans comp. Pt began to grow agitated and started to raise his voice, and spatted profanity at me; to which I advised pt that profanity and hostility are not necessary, and we will do whatever is needed to accommodate him. I offered him an appointment at [facility]; he declined stating he does not want to drive anymore. I offered him a courtesy call to EMS for transport; he declined stating he did not wish to be seen in the ER or at [facility]. Pt then stated he will get out of wheelchair and leave; I offered to escort pt to his vehicle and assisting him with getting into it for added safety measure; pt declined. Pt stated he would be calling his insurance and will be on his way.  Pt is scheduled to see cardiology tomorrow here at the [facility]. It is advised at this time pt follows up with his PCP.     This note routed to Dr HARPAL Deutsch for FYI purposes as pt was scheduled to be seen by him this morning.     Please acknowledge disposition. Thank you.

## 2019-02-26 NOTE — TELEPHONE ENCOUNTER
Patient was scheduled for an emergency room follow-up; however, when he presented for check-in he stated that he was presenting for workman's Comp.  The patient was told that workman's comp is not seen at this clinic.  At that time he became belligerent.  Clinic staff explained to the patient that he can be scheduled with the workman's Comp department at [facility].  The patient checked with his insurance and finally left.  He does have an appointment with his cardiologist tomorrow and his primary care doctor on March 15th.

## 2019-02-27 ENCOUNTER — OFFICE VISIT (OUTPATIENT)
Dept: CARDIOLOGY | Facility: CLINIC | Age: 64
End: 2019-02-27
Payer: COMMERCIAL

## 2019-02-27 VITALS
SYSTOLIC BLOOD PRESSURE: 90 MMHG | BODY MASS INDEX: 48.46 KG/M2 | OXYGEN SATURATION: 98 % | HEART RATE: 94 BPM | DIASTOLIC BLOOD PRESSURE: 51 MMHG | WEIGHT: 308.75 LBS | HEIGHT: 67 IN

## 2019-02-27 DIAGNOSIS — I10 ESSENTIAL HYPERTENSION: ICD-10-CM

## 2019-02-27 DIAGNOSIS — I50.42 CHRONIC COMBINED SYSTOLIC AND DIASTOLIC HEART FAILURE: ICD-10-CM

## 2019-02-27 DIAGNOSIS — I25.84 CORONARY ARTERY DISEASE DUE TO CALCIFIED CORONARY LESION: ICD-10-CM

## 2019-02-27 DIAGNOSIS — I42.9 CARDIOMYOPATHY, UNSPECIFIED TYPE: Primary | ICD-10-CM

## 2019-02-27 DIAGNOSIS — I70.90 ATHEROSCLEROSIS: ICD-10-CM

## 2019-02-27 DIAGNOSIS — I25.10 CORONARY ARTERY DISEASE DUE TO CALCIFIED CORONARY LESION: ICD-10-CM

## 2019-02-27 DIAGNOSIS — E78.2 MIXED HYPERLIPIDEMIA: ICD-10-CM

## 2019-02-27 PROBLEM — I42.0 DILATED CARDIOMYOPATHY: Status: ACTIVE | Noted: 2019-02-07

## 2019-02-27 PROCEDURE — 99999 PR PBB SHADOW E&M-EST. PATIENT-LVL III: ICD-10-PCS | Mod: PBBFAC,,, | Performed by: INTERNAL MEDICINE

## 2019-02-27 PROCEDURE — 3074F PR MOST RECENT SYSTOLIC BLOOD PRESSURE < 130 MM HG: ICD-10-PCS | Mod: CPTII,S$GLB,, | Performed by: INTERNAL MEDICINE

## 2019-02-27 PROCEDURE — 3078F PR MOST RECENT DIASTOLIC BLOOD PRESSURE < 80 MM HG: ICD-10-PCS | Mod: CPTII,S$GLB,, | Performed by: INTERNAL MEDICINE

## 2019-02-27 PROCEDURE — 3078F DIAST BP <80 MM HG: CPT | Mod: CPTII,S$GLB,, | Performed by: INTERNAL MEDICINE

## 2019-02-27 PROCEDURE — 3008F BODY MASS INDEX DOCD: CPT | Mod: CPTII,S$GLB,, | Performed by: INTERNAL MEDICINE

## 2019-02-27 PROCEDURE — 99213 PR OFFICE/OUTPT VISIT, EST, LEVL III, 20-29 MIN: ICD-10-PCS | Mod: S$GLB,,, | Performed by: INTERNAL MEDICINE

## 2019-02-27 PROCEDURE — 99213 OFFICE O/P EST LOW 20 MIN: CPT | Mod: S$GLB,,, | Performed by: INTERNAL MEDICINE

## 2019-02-27 PROCEDURE — 99499 UNLISTED E&M SERVICE: CPT | Mod: S$GLB,,, | Performed by: INTERNAL MEDICINE

## 2019-02-27 PROCEDURE — 99999 PR PBB SHADOW E&M-EST. PATIENT-LVL III: CPT | Mod: PBBFAC,,, | Performed by: INTERNAL MEDICINE

## 2019-02-27 PROCEDURE — 3008F PR BODY MASS INDEX (BMI) DOCUMENTED: ICD-10-PCS | Mod: CPTII,S$GLB,, | Performed by: INTERNAL MEDICINE

## 2019-02-27 PROCEDURE — 99499 RISK ADDL DX/OHS AUDIT: ICD-10-PCS | Mod: S$GLB,,, | Performed by: INTERNAL MEDICINE

## 2019-02-27 PROCEDURE — 3074F SYST BP LT 130 MM HG: CPT | Mod: CPTII,S$GLB,, | Performed by: INTERNAL MEDICINE

## 2019-02-27 RX ORDER — OMEPRAZOLE 40 MG/1
40 CAPSULE, DELAYED RELEASE ORAL DAILY
Refills: 8 | COMMUNITY
Start: 2019-02-18 | End: 2019-08-26

## 2019-02-27 RX ORDER — TAMSULOSIN HYDROCHLORIDE 0.4 MG/1
1 CAPSULE ORAL DAILY
Refills: 0 | COMMUNITY
Start: 2019-02-05 | End: 2019-08-26

## 2019-02-27 RX ORDER — RAMIPRIL 2.5 MG/1
2.5 CAPSULE ORAL DAILY
Status: ON HOLD
Start: 2019-02-27 | End: 2019-03-20 | Stop reason: SDUPTHER

## 2019-03-06 ENCOUNTER — TELEPHONE (OUTPATIENT)
Dept: HEPATOLOGY | Facility: CLINIC | Age: 64
End: 2019-03-06

## 2019-03-06 DIAGNOSIS — K74.69 OTHER CIRRHOSIS OF LIVER: Primary | ICD-10-CM

## 2019-03-06 NOTE — TELEPHONE ENCOUNTER
Called pt to discuss plan to proceed with TJ liver biopsy, as per recent message from Dr. Hall, no plans for repeat EUS so will proceed with TJ liver biopsy with portal pressure measurements    Discussed liver biopsy procedure and possible complications associated with liver biopsy including pain, bleeding, infection, and organ perforation. Reviewed the role of the procedure including confirming of diagnosis and staging of liver disease so pt can be appropriately followed from this point forward.    Pt verbalized understanding    Not sure if pt needs labs repeated before TJ biopsy but, if so, lab orders placed     Thanks!

## 2019-03-06 NOTE — TELEPHONE ENCOUNTER
----- Message from Chris Hall MD sent at 3/4/2019  7:49 AM CST -----  Regarding: RE: plans for EUS?  I don't believe there are plans for an EUS right now.  If his symptoms are stable I'd prefer to avoid endoscopic therapy  ----- Message -----  From: Aleshia Almonte NP  Sent: 3/4/2019   7:13 AM  To: Chris Hall MD  Subject: plans for EUS?                                   Hi Dr. Hall,    I am following Mr. Hall for liver disease (cirrhosis versus congestive hepatopathy) and we had previously discussed his case and there was mention of an EUS to be done for his pancreatic abnormalities. It looks like you have a clinic visit with him on 3/14.  Do you know if you still plan to do an EUS on him? We had previously discussed doing a liver biopsy at the same time you planned to do an EUS but just wanted to touch base to make sure the plan is still the same?    Thanks in advance  ANDREW Villa, FNP-C  Hepatology and Liver Transplant Nurse Practitioner  [facility]  [facility]

## 2019-03-08 ENCOUNTER — TELEPHONE (OUTPATIENT)
Dept: HEPATOLOGY | Facility: CLINIC | Age: 64
End: 2019-03-08

## 2019-03-08 NOTE — TELEPHONE ENCOUNTER
Received message from Aleshia Almonte NP to arrange for the patient to have a TJ Liver Biopsy with Pressure Measurements. Tentative date is for Tues 3/19/18 at 11:30 am.    Patient called and message relayed. He stated he was waiting for the call.  Spoke with and listen to the patient for over 20 min on the phone.  Patient unhappy [facility] does not take his Workman's Comp.  [facility] does not take his Insurance. I had to wait to long for Insurance to cover me.  Wants to have EUS done, thought it was scheduled on Wed with his Office Visit with Dr Hall.   Informed that it is on Hold unless you develop symptoms but you need to keep your appt.  Wants EUS and Biopsy done on the same day.  Unhappy that  is not working with him.  Pt stated my condition is getting worse not better.  Patient has multiple concerns.  Does not want to proceed with any testing.  Just have Aleshia to call me.

## 2019-03-08 NOTE — TELEPHONE ENCOUNTER
Spoke with Aleshia Graf NP.   She called and reached out to the patient.  The patient will do the TJ Liver Biopsy on Tues 3/19/19 at 11:30 am.  IR called and appt confirmed.  TJ Liver Biopsy written instructions and Appointment letter placed in the mail.

## 2019-03-09 NOTE — TELEPHONE ENCOUNTER
"Again, lengthy conversation had with patient, most of conversation about overall health and expectation after "being a  for many years", etc. Not many liver related questions. Pt verbalized understanding about previous extensive conversation about TJ biopsy, verbalized understanding that decision for any further upper GI procedure is to be determined by Dr. Hall at GI f/u visit next week, and from a liver perspective will proceed with biopsy to determine liver fibrosis staging and potential etiology     Patient had questions about cardiology w/u, stressed with pt to contact cardiology department with any questions he may have    Pt verbalized understanding  "

## 2019-03-12 ENCOUNTER — HOSPITAL ENCOUNTER (INPATIENT)
Facility: HOSPITAL | Age: 64
LOS: 6 days | Discharge: HOME HEALTH | DRG: 432 | End: 2019-03-21
Attending: EMERGENCY MEDICINE | Admitting: EMERGENCY MEDICINE
Payer: COMMERCIAL

## 2019-03-12 DIAGNOSIS — R06.02 SHORTNESS OF BREATH: ICD-10-CM

## 2019-03-12 DIAGNOSIS — R18.8 ASCITES: ICD-10-CM

## 2019-03-12 DIAGNOSIS — K70.31 ALCOHOLIC CIRRHOSIS OF LIVER WITH ASCITES: Primary | ICD-10-CM

## 2019-03-12 DIAGNOSIS — R07.9 CHEST PAIN: ICD-10-CM

## 2019-03-12 DIAGNOSIS — I48.91 A-FIB: ICD-10-CM

## 2019-03-12 DIAGNOSIS — I50.9 CHF (CONGESTIVE HEART FAILURE): ICD-10-CM

## 2019-03-12 DIAGNOSIS — R14.0 ABDOMINAL DISTENTION: ICD-10-CM

## 2019-03-12 DIAGNOSIS — I50.42 CHRONIC COMBINED SYSTOLIC AND DIASTOLIC HEART FAILURE: ICD-10-CM

## 2019-03-12 DIAGNOSIS — N17.9 ACUTE KIDNEY INJURY: ICD-10-CM

## 2019-03-12 DIAGNOSIS — I21.4 NSTEMI (NON-ST ELEVATED MYOCARDIAL INFARCTION): ICD-10-CM

## 2019-03-12 DIAGNOSIS — R00.0 TACHYCARDIA: ICD-10-CM

## 2019-03-12 LAB
ALBUMIN SERPL BCP-MCNC: 1.6 G/DL
ALP SERPL-CCNC: 128 U/L
ALT SERPL W/O P-5'-P-CCNC: 14 U/L
ANION GAP SERPL CALC-SCNC: 4 MMOL/L
AST SERPL-CCNC: 20 U/L
BASOPHILS # BLD AUTO: 0.03 K/UL
BASOPHILS NFR BLD: 0.4 %
BILIRUB SERPL-MCNC: 0.4 MG/DL
BNP SERPL-MCNC: 89 PG/ML
BUN SERPL-MCNC: 35 MG/DL
CALCIUM SERPL-MCNC: 7.6 MG/DL
CHLORIDE SERPL-SCNC: 105 MMOL/L
CO2 SERPL-SCNC: 21 MMOL/L
CREAT SERPL-MCNC: 1.6 MG/DL
DIFFERENTIAL METHOD: ABNORMAL
EOSINOPHIL # BLD AUTO: 0.1 K/UL
EOSINOPHIL NFR BLD: 0.9 %
ERYTHROCYTE [DISTWIDTH] IN BLOOD BY AUTOMATED COUNT: 13.9 %
EST. GFR  (AFRICAN AMERICAN): 51.9 ML/MIN/1.73 M^2
EST. GFR  (NON AFRICAN AMERICAN): 44.9 ML/MIN/1.73 M^2
GLUCOSE SERPL-MCNC: 255 MG/DL
HCT VFR BLD AUTO: 28.7 %
HGB BLD-MCNC: 9.2 G/DL
IMM GRANULOCYTES # BLD AUTO: 0.03 K/UL
IMM GRANULOCYTES NFR BLD AUTO: 0.4 %
LYMPHOCYTES # BLD AUTO: 1.8 K/UL
LYMPHOCYTES NFR BLD: 24 %
MCH RBC QN AUTO: 29.4 PG
MCHC RBC AUTO-ENTMCNC: 32.1 G/DL
MCV RBC AUTO: 92 FL
MONOCYTES # BLD AUTO: 0.8 K/UL
MONOCYTES NFR BLD: 11 %
NEUTROPHILS # BLD AUTO: 4.9 K/UL
NEUTROPHILS NFR BLD: 63.3 %
NRBC BLD-RTO: 0 /100 WBC
PLATELET # BLD AUTO: 399 K/UL
PMV BLD AUTO: 9.3 FL
POTASSIUM SERPL-SCNC: 5.1 MMOL/L
PROT SERPL-MCNC: 5.5 G/DL
RBC # BLD AUTO: 3.13 M/UL
SODIUM SERPL-SCNC: 130 MMOL/L
TROPONIN I SERPL DL<=0.01 NG/ML-MCNC: <0.006 NG/ML
WBC # BLD AUTO: 7.67 K/UL

## 2019-03-12 PROCEDURE — 99285 EMERGENCY DEPT VISIT HI MDM: CPT | Mod: 25

## 2019-03-12 PROCEDURE — 83880 ASSAY OF NATRIURETIC PEPTIDE: CPT

## 2019-03-12 PROCEDURE — 85025 COMPLETE CBC W/AUTO DIFF WBC: CPT

## 2019-03-12 PROCEDURE — 93005 ELECTROCARDIOGRAM TRACING: CPT

## 2019-03-12 PROCEDURE — 99285 PR EMERGENCY DEPT VISIT,LEVEL V: ICD-10-PCS | Mod: ,,, | Performed by: EMERGENCY MEDICINE

## 2019-03-12 PROCEDURE — 80053 COMPREHEN METABOLIC PANEL: CPT

## 2019-03-12 PROCEDURE — 84484 ASSAY OF TROPONIN QUANT: CPT

## 2019-03-12 PROCEDURE — 93010 ELECTROCARDIOGRAM REPORT: CPT | Mod: ,,, | Performed by: INTERNAL MEDICINE

## 2019-03-12 PROCEDURE — 99285 EMERGENCY DEPT VISIT HI MDM: CPT | Mod: ,,, | Performed by: EMERGENCY MEDICINE

## 2019-03-12 PROCEDURE — 93010 EKG 12-LEAD: ICD-10-PCS | Mod: ,,, | Performed by: INTERNAL MEDICINE

## 2019-03-12 NOTE — Clinical Note
dry, intact, no bleeding and no hematoma. Mynx closure device, gauze and tegaderm applied, hemostasis achieved. No complications

## 2019-03-13 PROBLEM — R07.9 CHEST PAIN: Status: ACTIVE | Noted: 2019-03-13

## 2019-03-13 PROBLEM — N17.9 AKI (ACUTE KIDNEY INJURY): Status: ACTIVE | Noted: 2019-03-13

## 2019-03-13 PROBLEM — R18.8 ASCITES: Status: ACTIVE | Noted: 2019-03-13

## 2019-03-13 LAB
ALBUMIN SERPL BCP-MCNC: 1.5 G/DL
ALP SERPL-CCNC: 128 U/L
ALT SERPL W/O P-5'-P-CCNC: 14 U/L
AMMONIA PLAS-SCNC: 36 UMOL/L
ANION GAP SERPL CALC-SCNC: 6 MMOL/L
AST SERPL-CCNC: 16 U/L
BASOPHILS # BLD AUTO: 0.04 K/UL
BASOPHILS NFR BLD: 0.6 %
BILIRUB SERPL-MCNC: 0.4 MG/DL
BILIRUB UR QL STRIP: NEGATIVE
BUN SERPL-MCNC: 33 MG/DL
CALCIUM SERPL-MCNC: 7.3 MG/DL
CHLORIDE SERPL-SCNC: 108 MMOL/L
CLARITY UR REFRACT.AUTO: CLEAR
CO2 SERPL-SCNC: 19 MMOL/L
COLOR UR AUTO: YELLOW
CREAT SERPL-MCNC: 1.3 MG/DL
DIFFERENTIAL METHOD: ABNORMAL
EOSINOPHIL # BLD AUTO: 0.1 K/UL
EOSINOPHIL NFR BLD: 0.8 %
ERYTHROCYTE [DISTWIDTH] IN BLOOD BY AUTOMATED COUNT: 13.8 %
EST. GFR  (AFRICAN AMERICAN): >60 ML/MIN/1.73 M^2
EST. GFR  (NON AFRICAN AMERICAN): 57.7 ML/MIN/1.73 M^2
GLUCOSE SERPL-MCNC: 125 MG/DL (ref 70–110)
GLUCOSE SERPL-MCNC: 210 MG/DL
GLUCOSE UR QL STRIP: NEGATIVE
GRAM STN SPEC: NORMAL
HCT VFR BLD AUTO: 28.6 %
HGB BLD-MCNC: 9.3 G/DL
HGB UR QL STRIP: NEGATIVE
IMM GRANULOCYTES # BLD AUTO: 0.02 K/UL
IMM GRANULOCYTES NFR BLD AUTO: 0.3 %
KETONES UR QL STRIP: NEGATIVE
LEUKOCYTE ESTERASE UR QL STRIP: NEGATIVE
LYMPHOCYTES # BLD AUTO: 1.9 K/UL
LYMPHOCYTES NFR BLD: 28.5 %
MCH RBC QN AUTO: 29.5 PG
MCHC RBC AUTO-ENTMCNC: 32.5 G/DL
MCV RBC AUTO: 91 FL
MONOCYTES # BLD AUTO: 0.7 K/UL
MONOCYTES NFR BLD: 10.9 %
NEUTROPHILS # BLD AUTO: 3.8 K/UL
NEUTROPHILS NFR BLD: 58.9 %
NITRITE UR QL STRIP: NEGATIVE
NRBC BLD-RTO: 0 /100 WBC
PH UR STRIP: 5 [PH] (ref 5–8)
PLATELET # BLD AUTO: 324 K/UL
PMV BLD AUTO: 9.1 FL
POCT GLUCOSE: 192 MG/DL (ref 70–110)
POCT GLUCOSE: 209 MG/DL (ref 70–110)
POCT GLUCOSE: 299 MG/DL (ref 70–110)
POTASSIUM SERPL-SCNC: 4.2 MMOL/L
PROT SERPL-MCNC: 5.3 G/DL
PROT UR QL STRIP: NEGATIVE
RBC # BLD AUTO: 3.15 M/UL
SODIUM SERPL-SCNC: 133 MMOL/L
SP GR UR STRIP: >=1.03 (ref 1–1.03)
TROPONIN I SERPL DL<=0.01 NG/ML-MCNC: <0.006 NG/ML
URN SPEC COLLECT METH UR: ABNORMAL
WBC # BLD AUTO: 6.5 K/UL

## 2019-03-13 PROCEDURE — 25000003 PHARM REV CODE 250: Performed by: PHYSICIAN ASSISTANT

## 2019-03-13 PROCEDURE — 82962 GLUCOSE BLOOD TEST: CPT

## 2019-03-13 PROCEDURE — 87070 CULTURE OTHR SPECIMN AEROBIC: CPT

## 2019-03-13 PROCEDURE — 87075 CULTR BACTERIA EXCEPT BLOOD: CPT

## 2019-03-13 PROCEDURE — 63600175 PHARM REV CODE 636 W HCPCS: Performed by: PHYSICIAN ASSISTANT

## 2019-03-13 PROCEDURE — 99220 PR INITIAL OBSERVATION CARE,LEVL III: ICD-10-PCS | Mod: ,,, | Performed by: PHYSICIAN ASSISTANT

## 2019-03-13 PROCEDURE — 81003 URINALYSIS AUTO W/O SCOPE: CPT

## 2019-03-13 PROCEDURE — 80053 COMPREHEN METABOLIC PANEL: CPT

## 2019-03-13 PROCEDURE — 87205 SMEAR GRAM STAIN: CPT

## 2019-03-13 PROCEDURE — 25500020 PHARM REV CODE 255: Performed by: EMERGENCY MEDICINE

## 2019-03-13 PROCEDURE — 82140 ASSAY OF AMMONIA: CPT

## 2019-03-13 PROCEDURE — G0378 HOSPITAL OBSERVATION PER HR: HCPCS

## 2019-03-13 PROCEDURE — 99220 PR INITIAL OBSERVATION CARE,LEVL III: CPT | Mod: ,,, | Performed by: PHYSICIAN ASSISTANT

## 2019-03-13 PROCEDURE — 85025 COMPLETE CBC W/AUTO DIFF WBC: CPT

## 2019-03-13 PROCEDURE — P9047 ALBUMIN (HUMAN), 25%, 50ML: HCPCS | Mod: JG | Performed by: INTERNAL MEDICINE

## 2019-03-13 PROCEDURE — 96374 THER/PROPH/DIAG INJ IV PUSH: CPT

## 2019-03-13 PROCEDURE — 63600175 PHARM REV CODE 636 W HCPCS: Mod: JG | Performed by: INTERNAL MEDICINE

## 2019-03-13 RX ORDER — ACETAMINOPHEN 325 MG/1
650 TABLET ORAL EVERY 4 HOURS PRN
Status: DISCONTINUED | OUTPATIENT
Start: 2019-03-13 | End: 2019-03-21 | Stop reason: HOSPADM

## 2019-03-13 RX ORDER — OXYCODONE HYDROCHLORIDE 5 MG/1
5 TABLET ORAL EVERY 4 HOURS PRN
Status: DISCONTINUED | OUTPATIENT
Start: 2019-03-13 | End: 2019-03-21 | Stop reason: HOSPADM

## 2019-03-13 RX ORDER — SPIRONOLACTONE 25 MG/1
100 TABLET ORAL DAILY
Status: DISCONTINUED | OUTPATIENT
Start: 2019-03-13 | End: 2019-03-13

## 2019-03-13 RX ORDER — INSULIN ASPART 100 [IU]/ML
0-5 INJECTION, SOLUTION INTRAVENOUS; SUBCUTANEOUS EVERY 6 HOURS PRN
Status: DISCONTINUED | OUTPATIENT
Start: 2019-03-13 | End: 2019-03-16

## 2019-03-13 RX ORDER — METOPROLOL SUCCINATE 50 MG/1
100 TABLET, EXTENDED RELEASE ORAL DAILY
Status: DISCONTINUED | OUTPATIENT
Start: 2019-03-13 | End: 2019-03-13

## 2019-03-13 RX ORDER — GLUCAGON 1 MG
1 KIT INJECTION
Status: DISCONTINUED | OUTPATIENT
Start: 2019-03-13 | End: 2019-03-21 | Stop reason: HOSPADM

## 2019-03-13 RX ORDER — SODIUM CHLORIDE 0.9 % (FLUSH) 0.9 %
5 SYRINGE (ML) INJECTION
Status: DISCONTINUED | OUTPATIENT
Start: 2019-03-13 | End: 2019-03-21 | Stop reason: HOSPADM

## 2019-03-13 RX ORDER — ATORVASTATIN CALCIUM 20 MG/1
40 TABLET, FILM COATED ORAL DAILY
Status: DISCONTINUED | OUTPATIENT
Start: 2019-03-13 | End: 2019-03-21 | Stop reason: HOSPADM

## 2019-03-13 RX ORDER — ALBUMIN HUMAN 250 G/1000ML
250 SOLUTION INTRAVENOUS ONCE
Status: COMPLETED | OUTPATIENT
Start: 2019-03-13 | End: 2019-03-13

## 2019-03-13 RX ORDER — ONDANSETRON 8 MG/1
8 TABLET, ORALLY DISINTEGRATING ORAL EVERY 8 HOURS PRN
Status: DISCONTINUED | OUTPATIENT
Start: 2019-03-13 | End: 2019-03-21 | Stop reason: HOSPADM

## 2019-03-13 RX ADMIN — PANCRELIPASE 3 CAPSULE: 60000; 12000; 38000 CAPSULE, DELAYED RELEASE PELLETS ORAL at 06:03

## 2019-03-13 RX ADMIN — INSULIN ASPART 3 UNITS: 100 INJECTION, SOLUTION INTRAVENOUS; SUBCUTANEOUS at 06:03

## 2019-03-13 RX ADMIN — PANCRELIPASE 3 CAPSULE: 60000; 12000; 38000 CAPSULE, DELAYED RELEASE PELLETS ORAL at 03:03

## 2019-03-13 RX ADMIN — ATORVASTATIN CALCIUM 40 MG: 20 TABLET, FILM COATED ORAL at 09:03

## 2019-03-13 RX ADMIN — IOHEXOL 100 ML: 350 INJECTION, SOLUTION INTRAVENOUS at 01:03

## 2019-03-13 RX ADMIN — ALBUMIN (HUMAN) 250 ML: 25 SOLUTION INTRAVENOUS at 01:03

## 2019-03-13 NOTE — ASSESSMENT & PLAN NOTE
Baseline Cr 1.0-1.2  - Cr 1.6 on admission  - daily BMP  - hold ACE and diuretics  - unclear if hepatorenal etiology

## 2019-03-13 NOTE — ED NOTES
LOC: The patient is awake, alert and aware of environment with an appropriate affect, the patient is oriented x 3 and speaking appropriately.  APPEARANCE: Patient resting comfortably and in no acute distress, patient is clean and well groomed  SKIN: The skin is warm and dry, jaundice, patient has normal skin turgor and moist mucus membranes, skin intact  MUSCULOSKELETAL: Patient moving all extremities well, no deformities noted.   RESPIRATORY: Airway is open and patent, breath sounds clear throughout all lung fields; respirations are spontaneous, patient has a normal effort and rate, no accessory muscle use noted. Pt reports SOB on exertion.   CARDIAC: Patient has peripheral edema noted. No complaints of chest pain at this time.   ABDOMEN: Pt presents with distention noted, abdominal tenderness. Bowel sounds present x 4  NEUROLOGIC: PERRL, 3 mm bilaterally, eyes open spontaneously, behavior appropriate to situation, follows commands, facial expression symmetrical, purposeful motor response noted, normal sensation in all extremities when touched with a finger.

## 2019-03-13 NOTE — PROCEDURES
Radiology Post-Procedure Note    Pre Op Diagnosis: Ascites  Post Op Diagnosis: Same    Procedure: Ultrasound Guided Paracentesis    Procedure performed by: OLY Rayo DNP  Written Informed Consent Obtained: Yes  Specimen Removed: YES clear yellow  Estimated Blood Loss: Minimal    Findings:   Successful paracentesis.  Albumin administered PRN per protocol.    Patient tolerated procedure well.

## 2019-03-13 NOTE — NURSING
eYssica GARCIA notified pt B/P 95/51. Pt asymptomatic, no new orders at this time. Will continue to monitor.

## 2019-03-13 NOTE — ASSESSMENT & PLAN NOTE
Shortness of breath    Patient with known cirrhosis and systolic heart failure presenting with worsening SOB, ascites, and LE edema for 4 weeks.  - CTA + ascites and edema  -IR paracentesis with 10.8L off  - Liver US Cirrhotic appearing liver with sequela of portal hypertension including severe volume ascites and splenomegaly  - hepatology to see patient  - Patient states increased debility due to weight gain and SOB--PT/OT to evaluate  - strict Is/Os  - daily weights  - hold ASA

## 2019-03-13 NOTE — ASSESSMENT & PLAN NOTE
Shortness of breath    Patient with known cirrhosis and systolic heart failure presenting with worsening SOB, ascites, and LE edema for 4 weeks.  - CTA + ascites and edema  - IR consulted for para, orders placed  - NPO  - strict Is/Os  - daily weights  - hold ASA

## 2019-03-13 NOTE — ED NOTES
LOC: The patient is awake, alert, and oriented to place, time, situation. Affect is appropriate.  Speech is appropriate and clear.     APPEARANCE: Patient resting comfortably in no acute distress.  Patient is clean and well groomed.    SKIN: The skin is warm and dry; color consistent with ethnicity.  Patient has normal skin turgor and moist mucus membranes.  Skin intact; no breakdown or bruising noted.     MUSCULOSKELETAL: Patient moving upper and lower extremities without difficulty.  Denies weakness. Pt has difficulty with ambulation due to BLE edema and SOB.     RESPIRATORY: Airway is open and patent. Respirations spontaneous, even, easy, and non-labored.  Patient has a normal effort and rate.  No accessory muscle use noted. Denies cough.     CARDIAC:  Normal rhythm and rate noted.  +3 BLE edema noted. Complaint of chest pain rating 3/10.       ABDOMEN: Soft and non tender to palpation.  Large distention noted    NEUROLOGIC: Eyes open spontaneously.  Behavior appropriate to situation.  Follows commands; facial expression symmetrical.  Purposeful motor response noted; normal sensation in all extremities.

## 2019-03-13 NOTE — PLAN OF CARE
03/13/19 1205   Discharge Assessment   Assessment Type Discharge Planning Assessment   Confirmed/corrected address and phone number on facesheet? Yes   Assessment information obtained from? Caregiver  (spouse, Geri Arrieta ([phone]))   Expected Length of Stay (days) 2   Communicated expected length of stay with patient/caregiver yes   Prior to hospitilization cognitive status: Alert/Oriented   Prior to hospitalization functional status: Assistive Equipment   Current cognitive status: Alert/Oriented   Current Functional Status: Needs Assistance   Lives With spouse   Able to Return to Prior Arrangements yes   Is patient able to care for self after discharge? Yes   Patient's perception of discharge disposition home or selfcare   Readmission Within the Last 30 Days no previous admission in last 30 days   Patient currently being followed by outpatient case management? No   Patient currently receives any other outside agency services? No   Equipment Currently Used at Home CPAP;hospital bed;shower chair;grab bar  (brinda boots x2 & compression stockings)   Do you have any problems affording any of your prescribed medications? No   Is the patient taking medications as prescribed? yes   Does the patient have transportation home? Yes   Transportation Anticipated family or friend will provide  (spouse)   Does the patient receive services at the Coumadin Clinic? No   Discharge Plan A Home with family   Discharge Plan B Home Health   Patient/Family in Agreement with Plan yes     Dx: ascites  Pharm: [facility]  Hosp f/u appt: Dr. Samir Mahmood (PCP) on 3/18/19 at 1445 & Dr. Carney (cards) on 3/19/19 at 1430    Patient off the unit having a paracentesis done when CM rounded. All discharge planning assessment information was obtained from the patient's spouse, Geri Arrieta, at the patient's bedside.     CM informed [facility] on-call nurse, Phoebe Pena ([phone]), of patient's hospitalization. Phoebe stated that she follows  the patient outside of the hospital & tries to assist in preventing admissions.     CM questioned NP Yessica Dc if the patient will need scheduled out patient paracentesis following discharge. Awaiting orders.     Will continue to follow.

## 2019-03-13 NOTE — SUBJECTIVE & OBJECTIVE
Interval History: Patient much improved after paracentesis. States recent debility related to increasing volume overload making it difficult for him to ambulate. PT/OT. Patient updated on plan and agreed.     Review of Systems   Constitutional: Positive for activity change. Negative for chills and fever.   HENT: Negative for trouble swallowing.    Eyes: Negative for photophobia and visual disturbance.   Respiratory: Positive for shortness of breath (improved). Negative for cough, chest tightness and wheezing.    Cardiovascular: Positive for leg swelling. Negative for chest pain (with exertion) and palpitations.   Gastrointestinal: Positive for abdominal distention (much improved). Negative for abdominal pain, constipation, diarrhea, nausea and vomiting.   Genitourinary: Negative for dysuria, hematuria and urgency.   Musculoskeletal: Negative for back pain and neck pain.   Skin: Negative for rash and wound.   Neurological: Positive for weakness. Negative for dizziness, facial asymmetry, speech difficulty and light-headedness.   Psychiatric/Behavioral: Negative for agitation and confusion. The patient is not nervous/anxious.      Objective:     Vital Signs (Most Recent):  Temp: 96.3 °F (35.7 °C) (03/13/19 1556)  Pulse: 107 (03/13/19 1556)  Resp: 18 (03/13/19 1556)  BP: (!) 95/51 (03/13/19 1556)  SpO2: 100 % (03/13/19 1556) Vital Signs (24h Range):  Temp:  [96.3 °F (35.7 °C)-98 °F (36.7 °C)] 96.3 °F (35.7 °C)  Pulse:  [] 107  Resp:  [14-23] 18  SpO2:  [95 %-100 %] 100 %  BP: ()/(51-90) 95/51     Weight: 134.6 kg (296 lb 13.6 oz)  Body mass index is 46.49 kg/m².  No intake or output data in the 24 hours ending 03/13/19 1649   Physical Exam   Constitutional: He is oriented to person, place, and time. He appears well-developed and well-nourished. No distress.   HENT:   Head: Normocephalic and atraumatic.   Mouth/Throat: No oropharyngeal exudate.   Eyes: No scleral icterus.   Cardiovascular: Normal rate and  regular rhythm.   Murmur heard.  Pulmonary/Chest: Effort normal. He has no wheezes. He has no rales.   Abdominal: Soft. Bowel sounds are normal. He exhibits distension (improved), fluid wave and ascites. He exhibits no mass. There is no tenderness. There is no rebound and no guarding.   Musculoskeletal: Normal range of motion. He exhibits edema (BLE, legs wrapped in ACE). He exhibits no tenderness.   Neurological: He is alert and oriented to person, place, and time. No cranial nerve deficit or sensory deficit.   Skin: Skin is warm and dry. Capillary refill takes less than 2 seconds.   Scattered ecchymoses   Psychiatric: He has a normal mood and affect. His behavior is normal. Thought content normal.       Significant Labs:   CBC:   Recent Labs   Lab 03/12/19 2109 03/13/19 0356   WBC 7.67 6.50   HGB 9.2* 9.3*   HCT 28.7* 28.6*   * 324     CMP:   Recent Labs   Lab 03/12/19 2109 03/13/19 0356   * 133*   K 5.1 4.2    108   CO2 21* 19*   * 210*   BUN 35* 33*   CREATININE 1.6* 1.3   CALCIUM 7.6* 7.3*   PROT 5.5* 5.3*   ALBUMIN 1.6* 1.5*   BILITOT 0.4 0.4   ALKPHOS 128 128   AST 20 16   ALT 14 14   ANIONGAP 4* 6*   EGFRNONAA 44.9* 57.7*     Urine Studies:   Recent Labs   Lab 03/13/19 0356   COLORU Yellow   APPEARANCEUA Clear   PHUR 5.0   SPECGRAV >=1.030*   PROTEINUA Negative   GLUCUA Negative   KETONESU Negative   BILIRUBINUA Negative   OCCULTUA Negative   NITRITE Negative   LEUKOCYTESUR Negative       Significant Imaging: I have reviewed all pertinent imaging results/findings within the past 24 hours.

## 2019-03-13 NOTE — ED TRIAGE NOTES
Jian Arrieta, a 63 y.o. male presents to the ED w/ complaint of mid sternal chest pain that started today around 1400. Pt states he took 2 sublingual NTG around 1500 with some relief. Pt states the chronic SOB began to get worse with the chest pain. Pt now rates the chest pain 3/10      Triage note:  Chief Complaint   Patient presents with    Chest Pain     Pt reports midsternal intermittent chest pain. Pt has had 5 cardiac stents and triple bypass. Pt took 2 Sl nitro two hours ago with relief.      Review of patient's allergies indicates:  No Known Allergies  Past Medical History:   Diagnosis Date    Alcohol abuse     Anasarca 1/28/2019    Arthropathy associated with neurological disorder 9/2/2015    Atherosclerosis     Charcot foot due to diabetes mellitus     Chronic combined systolic and diastolic heart failure 01/29/2019 1-28-19 Left VentricleModerate decreased ejection fraction at 30%. Normal left ventricular cavity size. Normal wall thickness observed. Grade I (mild) left ventricular diastolic dysfunction consistent with impaired relaxation. Normal left atrial pressure. Moderate, global hypokinesis(see wall scoring diagram). Right VentricleNormal cavity size, wall thickness and ejection fraction. Wall motion n    Chronic pancreatitis 1/28/2019    Cirrhosis of liver with ascites 1/28/2019    Colon polyps     approx 5 yrs ago    Coronary artery disease due to calcified coronary lesion 05/08/2015    5 stents on ASA      Diabetic polyneuropathy associated with type 2 diabetes mellitus 9/2/2015    Diverticulosis 1/28/2019    DM type 2 with diabetic peripheral neuropathy 2/4/2019    Essential hypertension 1/28/2019    Former smoker 8/26/2015    Healed ulcer of left foot on examination 6/20/2017    Hydrocele     approx 1.5 yrs ago    Hypoalbuminemia 2/4/2019    Lymphedema of both lower extremities 1/29/2019    Mixed hyperlipidemia 5/8/2015    Morbid obesity with BMI of 50.0-59.9, adult  5/8/2015    Onychomycosis of multiple toenails with type 2 diabetes mellitus and peripheral neuropathy 6/20/2017    Other cirrhosis of liver     1-28-19 Liver has a cirrhotic morphology with no focal lesions.  Significant interval increase in ascites when compared to prior exam which may account for patient's abdominal distension.  Hypodense air-fluid collection along the body of the pancreas which is slightly smaller when compared to prior CT.  Findings may relate to pancreatic necrosis with pancreatic pseudocysts with infected pseudocyst    Perianal cyst     approx 2 yrs ago    Pseudocyst of pancreas 1/28/2019 1-28-19 Liver has a cirrhotic morphology with no focal lesions.  Significant interval increase in ascites when compared to prior exam which may account for patient's abdominal distension.  Hypodense air-fluid collection along the body of the pancreas which is slightly smaller when compared to prior CT.  Findings may relate to pancreatic necrosis with pancreatic pseudocysts with infected pseudocyst    Skin cancer     skin cancer    Sleep apnea 8/26/2015    Status post bariatric surgery 1/11/2016    Type 2 diabetes mellitus, with long-term current use of insulin 5/8/2015

## 2019-03-13 NOTE — H&P
Radiology History & Physical      SUBJECTIVE:     Chief Complaint: 63 year old male patient with medical issues of CAD s/p PCI (x5) with subsequent CABG x3v (~2017 at ), HLD, DM2, BERHANE on CPAP, R knee OA, Charcot foot, EtOH cirrhosis, chronic pancreatitis and normocytic anemia. Presents with worsening SOB for the last 4 weeks. Patient states he had ascitic tapping of 5 L 4 weeks ago. His body swelling and RANDY improved temporarily then started to worsen again now he has SOB to minimal exertion. Patient at his baseline is able to walk at his house from one end to the other. But now he could not walk three steps without getting SOB. He has LE swelling and increased abdominal distension. He reports associated exertional chest pain feels like heaviness. He denies LOC, orthopnea and PND. Denies fever, cough, chills, rigors and weight gain. Patient states chronic constipation and hard stool and relates it to iron intake. He also denies N/V, dizziness, light headedness and seizures. His wife reports that sometime he becomes confused Denies eye, ear and urinary symptoms Reports compliance to his medications      History of Present Illness:  Jian Arrieta is a 63 y.o. male who presents for evaluation of ascites  Past Medical History:   Diagnosis Date    Alcohol abuse     Anasarca 1/28/2019    Arthropathy associated with neurological disorder 9/2/2015    Atherosclerosis     Charcot foot due to diabetes mellitus     Chronic combined systolic and diastolic heart failure 01/29/2019 1-28-19 Left VentricleModerate decreased ejection fraction at 30%. Normal left ventricular cavity size. Normal wall thickness observed. Grade I (mild) left ventricular diastolic dysfunction consistent with impaired relaxation. Normal left atrial pressure. Moderate, global hypokinesis(see wall scoring diagram). Right VentricleNormal cavity size, wall thickness and ejection fraction. Wall motion n    Chronic pancreatitis 1/28/2019     Cirrhosis of liver with ascites 1/28/2019    Colon polyps     approx 5 yrs ago    Coronary artery disease due to calcified coronary lesion 05/08/2015    5 stents on ASA      Diabetic polyneuropathy associated with type 2 diabetes mellitus 9/2/2015    Diverticulosis 1/28/2019    DM type 2 with diabetic peripheral neuropathy 2/4/2019    Essential hypertension 1/28/2019    Former smoker 8/26/2015    Healed ulcer of left foot on examination 6/20/2017    Hydrocele     approx 1.5 yrs ago    Hypoalbuminemia 2/4/2019    Lymphedema of both lower extremities 1/29/2019    Mixed hyperlipidemia 5/8/2015    Morbid obesity with BMI of 50.0-59.9, adult 5/8/2015    Onychomycosis of multiple toenails with type 2 diabetes mellitus and peripheral neuropathy 6/20/2017    Other cirrhosis of liver     1-28-19 Liver has a cirrhotic morphology with no focal lesions.  Significant interval increase in ascites when compared to prior exam which may account for patient's abdominal distension.  Hypodense air-fluid collection along the body of the pancreas which is slightly smaller when compared to prior CT.  Findings may relate to pancreatic necrosis with pancreatic pseudocysts with infected pseudocyst    Perianal cyst     approx 2 yrs ago    Pseudocyst of pancreas 1/28/2019 1-28-19 Liver has a cirrhotic morphology with no focal lesions.  Significant interval increase in ascites when compared to prior exam which may account for patient's abdominal distension.  Hypodense air-fluid collection along the body of the pancreas which is slightly smaller when compared to prior CT.  Findings may relate to pancreatic necrosis with pancreatic pseudocysts with infected pseudocyst    Skin cancer     skin cancer    Sleep apnea 8/26/2015    Status post bariatric surgery 1/11/2016    Type 2 diabetes mellitus, with long-term current use of insulin 5/8/2015     Past Surgical History:   Procedure Laterality Date    ANGIOPLASTY      total x5  stents    COLONOSCOPY N/A 10/6/2015    Performed by Shekhar Richards MD at [facility] (2ND FLR)    CORONARY ARTERY BYPASS GRAFT  2017    x3    CYST REMOVAL      GASTRECTOMY      GASTRECTOMY-SLEEVE-LAPAROSCOPIC - [id number] N/A 12/22/2015    Performed by Micheal Villavicencio Jr., MD at [facility] OR 2ND FLR    KNEE ARTHROSCOPY      perianal surgery      perianal cyst removed       Home Meds:   Prior to Admission medications    Medication Sig Start Date End Date Taking? Authorizing Provider   aspirin 325 MG tablet Take 81 mg by mouth once daily.   Yes Historical Provider, MD   atorvastatin (LIPITOR) 40 MG tablet Take 1 tablet (40 mg total) by mouth once daily. 2/4/19 2/4/20 Yes Alfonso Mahmood MD   cyanocobalamin, vitamin B-12, 500 mcg Subl Place 1 tablet under the tongue every Monday.    Yes Historical Provider, MD   furosemide (LASIX) 40 MG tablet Take 1 tablet (40 mg total) by mouth once daily. 2/4/19 2/4/20 Yes Alfonso Mahmood MD   insulin aspart U-100 (NOVOLOG) 100 unit/mL injection Inject 4 Units into the skin 3 (three) times daily before meals.   Yes Historical Provider, MD   insulin detemir (LEVEMIR FLEXPEN SUBQ) Inject 12 Units into the skin once daily.    Yes Historical Provider, MD   lipase-protease-amylase (CREON) 36,000-114,000- 180,000 unit CpDR Take 1 capsule by mouth 3 (three) times daily. 2/4/19  Yes Alofnso Mahmood MD   metoprolol succinate (TOPROL-XL) 100 MG 24 hr tablet Take 1 tablet (100 mg total) by mouth once daily. 2/4/19 2/4/20 Yes Alfonso Mahmood MD   omeprazole (PRILOSEC) 40 MG capsule Take 40 mg by mouth once daily. 2/18/19  Yes Historical Provider, MD   Colto ULTRA TEST Strp 4 (four) times daily. Use to test blood sugar four times a day. 10/15/15  Yes Historical Provider, MD   ramipril (ALTACE) 2.5 MG capsule Take 1 capsule (2.5 mg total) by mouth once daily. 2/27/19  Yes Jaime Carney III, MD   spironolactone (ALDACTONE) 100 MG tablet Take 1 tablet (100 mg total) by mouth once daily. 2/4/19  Yes Alfonso  KATHRYN Mahmood MD   tamsulosin (FLOMAX) 0.4 mg Cap Take 1 capsule by mouth once daily. Pt has not started taking as of 2/27/19. 2/5/19  Yes Historical Provider, MD   vitamin D (VITAMIN D3) 1000 units Tab Take 1 tablet (1,000 Units total) by mouth once daily. 1/31/19  Yes Jian Lambert MD     Anticoagulants/Antiplatelets: Aspirin    Allergies: Review of patient's allergies indicates:  No Known Allergies  Sedation History:  no adverse reactions    Review of Systems:   Hematological: no known coagulopathies  Respiratory: no shortness of breath  Cardiovascular: no chest pain  Gastrointestinal: no abdominal pain  Genito-Urinary: no dysuria  Musculoskeletal: negative  Neurological: no TIA or stroke symptoms         OBJECTIVE:     Vital Signs (Most Recent)  Temp: 97.4 °F (36.3 °C) (03/12/19 1939)  Pulse: (!) 112 (03/13/19 0852)  Resp: (!) 21 (03/13/19 0852)  BP: (!) 123/58 (03/13/19 0852)  SpO2: 100 % (03/13/19 0852)    Physical Exam:  ASA: 2  Mallampati: NA    General: no acute distress  Mental Status: alert and oriented to person, place and time  HEENT: normocephalic, atraumatic  Chest: unlabored breathing  Heart: regular heart rate  Abdomen: distended  Extremity: moves all extremities    Laboratory  Lab Results   Component Value Date    INR 1.1 02/07/2019       Lab Results   Component Value Date    WBC 6.50 03/13/2019    HGB 9.3 (L) 03/13/2019    HCT 28.6 (L) 03/13/2019    MCV 91 03/13/2019     03/13/2019      Lab Results   Component Value Date     (H) 03/13/2019     (L) 03/13/2019    K 4.2 03/13/2019     03/13/2019    CO2 19 (L) 03/13/2019    BUN 33 (H) 03/13/2019    CREATININE 1.3 03/13/2019    CALCIUM 7.3 (L) 03/13/2019    MG 1.7 02/23/2019    ALT 14 03/13/2019    AST 16 03/13/2019    ALBUMIN 1.5 (L) 03/13/2019    BILITOT 0.4 03/13/2019    BILIDIR 0.2 07/10/2015       ASSESSMENT/PLAN:     Sedation Plan: local anesthesia  Patient will undergo US guided paracentesis

## 2019-03-13 NOTE — HPI
Patient is a 64yo male with a PMHx of CAD (s/p PCI with 5 stents) with subsequent CABG x 3v (2017 at ), HLD, DM2, BERHANE on CPAP, EtOh cirrhosis being admitted to observation for shortness of breath due to ascites. Patient reports worsening shortness of breath for the last 4 weeks with associated LE edema and belly distension. He reports having a therapeutic paracentesis 4 weeks ago which removed 5L of fluid.  His body swelling and SOB improved temporarily then started to worsen again. Now he is SOB with minimal exertion. Patient at his baseline is able to walk at his house from one end to the other. Now he is unable to ambulate three steps without getting SOB. He reports associated exertional chest pain feels like heaviness. He denies LOC, orthopnea and PND. He denies fever, cough, chills, rigors and weight gain. Patient states chronic constipation and hard stool and relates it to iron intake. His wife reports that sometime he becomes confused. He reports compliance to his medications.    In the ED, vitals stable. Intake labs remarkable for Na 130, Cr 1.6. Troponin 0.006 x 2. CTA showed abdominal ascites and stable pancreatic findings.

## 2019-03-13 NOTE — ED NOTES
Assumed patient care.  Pt sleeping on stretcher in NAD, respirations even and unlabored. Stretcher locked and in lowest position, side rails x 2, call bell within reach. Cardiac monitor, pulse ox and BP cuff applied cycling Q 30 minute vitals. Will continue to monitor and update pt on plan of care.

## 2019-03-13 NOTE — CONSULTS
Radiology Consult    Jian Arrieta is a 63 y.o. male with a history of CHF and ascites.    Past Medical History:   Diagnosis Date    Alcohol abuse     Anasarca 1/28/2019    Arthropathy associated with neurological disorder 9/2/2015    Atherosclerosis     Charcot foot due to diabetes mellitus     Chronic combined systolic and diastolic heart failure 01/29/2019 1-28-19 Left VentricleModerate decreased ejection fraction at 30%. Normal left ventricular cavity size. Normal wall thickness observed. Grade I (mild) left ventricular diastolic dysfunction consistent with impaired relaxation. Normal left atrial pressure. Moderate, global hypokinesis(see wall scoring diagram). Right VentricleNormal cavity size, wall thickness and ejection fraction. Wall motion n    Chronic pancreatitis 1/28/2019    Cirrhosis of liver with ascites 1/28/2019    Colon polyps     approx 5 yrs ago    Coronary artery disease due to calcified coronary lesion 05/08/2015    5 stents on ASA      Diabetic polyneuropathy associated with type 2 diabetes mellitus 9/2/2015    Diverticulosis 1/28/2019    DM type 2 with diabetic peripheral neuropathy 2/4/2019    Essential hypertension 1/28/2019    Former smoker 8/26/2015    Healed ulcer of left foot on examination 6/20/2017    Hydrocele     approx 1.5 yrs ago    Hypoalbuminemia 2/4/2019    Lymphedema of both lower extremities 1/29/2019    Mixed hyperlipidemia 5/8/2015    Morbid obesity with BMI of 50.0-59.9, adult 5/8/2015    Onychomycosis of multiple toenails with type 2 diabetes mellitus and peripheral neuropathy 6/20/2017    Other cirrhosis of liver     1-28-19 Liver has a cirrhotic morphology with no focal lesions.  Significant interval increase in ascites when compared to prior exam which may account for patient's abdominal distension.  Hypodense air-fluid collection along the body of the pancreas which is slightly smaller when compared to prior CT.  Findings may relate to  pancreatic necrosis with pancreatic pseudocysts with infected pseudocyst    Perianal cyst     approx 2 yrs ago    Pseudocyst of pancreas 1/28/2019 1-28-19 Liver has a cirrhotic morphology with no focal lesions.  Significant interval increase in ascites when compared to prior exam which may account for patient's abdominal distension.  Hypodense air-fluid collection along the body of the pancreas which is slightly smaller when compared to prior CT.  Findings may relate to pancreatic necrosis with pancreatic pseudocysts with infected pseudocyst    Skin cancer     skin cancer    Sleep apnea 8/26/2015    Status post bariatric surgery 1/11/2016    Type 2 diabetes mellitus, with long-term current use of insulin 5/8/2015     Past Surgical History:   Procedure Laterality Date    ANGIOPLASTY      total x5 stents    COLONOSCOPY N/A 10/6/2015    Performed by [facility]khar Richards MD at [facility] (2ND FLR)    CORONARY ARTERY BYPASS GRAFT  2017    x3    CYST REMOVAL      GASTRECTOMY      GASTRECTOMY-SLEEVE-LAPAROSCOPIC - [id number] N/A 12/22/2015    Performed by Micheal Villavicencio Jr., MD at [facility] OR 2ND FLR    KNEE ARTHROSCOPY      perianal surgery      perianal cyst removed       Procedure: Paracentesis    Scheduled Meds:    atorvastatin  40 mg Oral Daily    lipase-protease-amylase 12,000-38,000-60,000 units  3 capsule Oral TID WM     Continuous Infusions:   PRN Meds:acetaminophen, dextrose 50%, glucagon (human recombinant), insulin aspart U-100, ondansetron, oxyCODONE    Allergies: Review of patient's allergies indicates:  No Known Allergies    Labs:  No results for input(s): INR in the last 168 hours.    Invalid input(s):  PT,  PTT    Recent Labs   Lab 03/13/19  0356   WBC 6.50   HGB 9.3*   HCT 28.6*   MCV 91         Recent Labs   Lab 03/13/19  0356   *   *   K 4.2      CO2 19*   BUN 33*   CREATININE 1.3   CALCIUM 7.3*   ALT 14   AST 16   ALBUMIN 1.5*   BILITOT 0.4         Vitals (Most  "Recent):  Temp: 97.4 °F (36.3 °C) (03/12/19 1939)  Pulse: 106 (03/13/19 0531)  Resp: 17 (03/13/19 0531)  BP: 118/68 (03/13/19 0531)  SpO2: 97 % (03/13/19 0531)    Plan:   Hold anticoagulation  Paracentesis today    Rustam "Tamia Mohamud MD  Radiology PGY-5  [phone]      "

## 2019-03-13 NOTE — H&P
[facility] Hospital Medicine  History & Physical    Patient Name: Jian Arrieta  MRN: [id number]  Admission Date: 3/12/2019  Attending Physician: Timoteo Cortes III, MD   Primary Care Provider: Samir Mahmood MD    [facility] Medicine Team: [facility] F Yasmani Nguyen PA-C     Patient information was obtained from patient, past medical records and ER records.     Subjective:     Principal Problem:Ascites    Chief Complaint:   Chief Complaint   Patient presents with    Chest Pain     Pt reports midsternal intermittent chest pain. Pt has had 5 cardiac stents and triple bypass. Pt took 2 Sl nitro two hours ago with relief.         HPI: Patient is a 64yo male with a PMHx of CAD (s/p PCI with 5 stents) with subsequent CABG x 3v (2017 at ), HLD, DM2, BERHANE on CPAP, EtOh cirrhosis being admitted to observation for shortness of breath due to ascites. Patient reports worsening shortness of breath for the last 4 weeks with associated LE edema and belly distension. He reports having a therapeutic paracentesis 4 weeks ago which removed 5L of fluid.  His body swelling and SOB improved temporarily then started to worsen again. Now he is SOB with minimal exertion. Patient at his baseline is able to walk at his house from one end to the other. Now he is unable to ambulate three steps without getting SOB. He reports associated exertional chest pain feels like heaviness. He denies LOC, orthopnea and PND. He denies fever, cough, chills, rigors and weight gain. Patient states chronic constipation and hard stool and relates it to iron intake. His wife reports that sometime he becomes confused. He reports compliance to his medications.    In the ED, vitals stable. Intake labs remarkable for Na 130, Cr 1.6. Troponin 0.006 x 2. CTA showed abdominal ascites and stable pancreatic findings.    Past Medical History:   Diagnosis Date    Alcohol abuse     Anasarca 1/28/2019    Arthropathy associated with neurological  disorder 9/2/2015    Atherosclerosis     Charcot foot due to diabetes mellitus     Chronic combined systolic and diastolic heart failure 01/29/2019 1-28-19 Left VentricleModerate decreased ejection fraction at 30%. Normal left ventricular cavity size. Normal wall thickness observed. Grade I (mild) left ventricular diastolic dysfunction consistent with impaired relaxation. Normal left atrial pressure. Moderate, global hypokinesis(see wall scoring diagram). Right VentricleNormal cavity size, wall thickness and ejection fraction. Wall motion n    Chronic pancreatitis 1/28/2019    Cirrhosis of liver with ascites 1/28/2019    Colon polyps     approx 5 yrs ago    Coronary artery disease due to calcified coronary lesion 05/08/2015    5 stents on ASA      Diabetic polyneuropathy associated with type 2 diabetes mellitus 9/2/2015    Diverticulosis 1/28/2019    DM type 2 with diabetic peripheral neuropathy 2/4/2019    Essential hypertension 1/28/2019    Former smoker 8/26/2015    Healed ulcer of left foot on examination 6/20/2017    Hydrocele     approx 1.5 yrs ago    Hypoalbuminemia 2/4/2019    Lymphedema of both lower extremities 1/29/2019    Mixed hyperlipidemia 5/8/2015    Morbid obesity with BMI of 50.0-59.9, adult 5/8/2015    Onychomycosis of multiple toenails with type 2 diabetes mellitus and peripheral neuropathy 6/20/2017    Other cirrhosis of liver     1-28-19 Liver has a cirrhotic morphology with no focal lesions.  Significant interval increase in ascites when compared to prior exam which may account for patient's abdominal distension.  Hypodense air-fluid collection along the body of the pancreas which is slightly smaller when compared to prior CT.  Findings may relate to pancreatic necrosis with pancreatic pseudocysts with infected pseudocyst    Perianal cyst     approx 2 yrs ago    Pseudocyst of pancreas 1/28/2019 1-28-19 Liver has a cirrhotic morphology with no focal lesions.   Significant interval increase in ascites when compared to prior exam which may account for patient's abdominal distension.  Hypodense air-fluid collection along the body of the pancreas which is slightly smaller when compared to prior CT.  Findings may relate to pancreatic necrosis with pancreatic pseudocysts with infected pseudocyst    Skin cancer     skin cancer    Sleep apnea 8/26/2015    Status post bariatric surgery 1/11/2016    Type 2 diabetes mellitus, with long-term current use of insulin 5/8/2015       Past Surgical History:   Procedure Laterality Date    ANGIOPLASTY      total x5 stents    COLONOSCOPY N/A 10/6/2015    Performed by Shekhar Richards MD at [facility] ENDO (2ND FLR)    CORONARY ARTERY BYPASS GRAFT  2017    x3    CYST REMOVAL      GASTRECTOMY      GASTRECTOMY-SLEEVE-LAPAROSCOPIC - 86831 N/A 12/22/2015    Performed by Micheal Villavicencio Jr., MD at [facility] OR 2ND FLR    KNEE ARTHROSCOPY      perianal surgery      perianal cyst removed       Review of patient's allergies indicates:  No Known Allergies    No current facility-administered medications on file prior to encounter.      Current Outpatient Medications on File Prior to Encounter   Medication Sig    aspirin 325 MG tablet Take 81 mg by mouth once daily.    atorvastatin (LIPITOR) 40 MG tablet Take 1 tablet (40 mg total) by mouth once daily.    cyanocobalamin, vitamin B-12, 500 mcg Subl Place 1 tablet under the tongue every Monday.     furosemide (LASIX) 40 MG tablet Take 1 tablet (40 mg total) by mouth once daily.    insulin aspart U-100 (NOVOLOG) 100 unit/mL injection Inject 4 Units into the skin 3 (three) times daily before meals.    insulin detemir (LEVEMIR FLEXPEN SUBQ) Inject 12 Units into the skin once daily.     lipase-protease-amylase (CREON) 36,000-114,000- 180,000 unit CpDR Take 1 capsule by mouth 3 (three) times daily.    metoprolol succinate (TOPROL-XL) 100 MG 24 hr tablet Take 1 tablet (100 mg total) by mouth once daily.     omeprazole (PRILOSEC) 40 MG capsule Take 40 mg by mouth once daily.    ONETOUCH ULTRA TEST Strp 4 (four) times daily. Use to test blood sugar four times a day.    ramipril (ALTACE) 2.5 MG capsule Take 1 capsule (2.5 mg total) by mouth once daily.    spironolactone (ALDACTONE) 100 MG tablet Take 1 tablet (100 mg total) by mouth once daily.    tamsulosin (FLOMAX) 0.4 mg Cap Take 1 capsule by mouth once daily. Pt has not started taking as of 19.    vitamin D (VITAMIN D3) 1000 units Tab Take 1 tablet (1,000 Units total) by mouth once daily.     Family History     Problem Relation (Age of Onset)    Cancer Mother, Father, Paternal Grandfather, Brother    Diabetes Maternal Grandmother    Heart disease Father    No Known Problems Paternal Grandmother    Obesity Sister    Parkinsonism Brother    Stroke Maternal Grandfather        Tobacco Use    Smoking status: Former Smoker     Packs/day: 2.00     Years: 30.00     Pack years: 60.00     Types: Cigarettes     Last attempt to quit: 2005     Years since quittin.1    Smokeless tobacco: Never Used   Substance and Sexual Activity    Alcohol use: No     Comment: started ~, reports 1 shot daily, max 3 shots daily, vague about alcohol consumption. Last drink 2018    Drug use: No    Sexual activity: Not on file     Review of Systems   Constitutional: Positive for activity change. Negative for chills and fever.   HENT: Negative for trouble swallowing.    Eyes: Negative for photophobia and visual disturbance.   Respiratory: Positive for chest tightness, shortness of breath and wheezing. Negative for cough.    Cardiovascular: Positive for chest pain (with exertion), palpitations and leg swelling.   Gastrointestinal: Positive for abdominal distention, constipation and diarrhea. Negative for abdominal pain, nausea and vomiting.   Genitourinary: Negative for dysuria, hematuria and urgency.   Musculoskeletal: Negative for back pain and neck pain.   Skin:  Negative for rash and wound.   Neurological: Negative for dizziness, facial asymmetry, speech difficulty and light-headedness.   Psychiatric/Behavioral: Negative for agitation and confusion. The patient is not nervous/anxious.      Objective:     Vital Signs (Most Recent):  Temp: 97.4 °F (36.3 °C) (03/12/19 1939)  Pulse: 104 (03/13/19 0231)  Resp: 19 (03/13/19 0231)  BP: (!) 102/56 (03/13/19 0231)  SpO2: 95 % (03/13/19 0231) Vital Signs (24h Range):  Temp:  [97.4 °F (36.3 °C)] 97.4 °F (36.3 °C)  Pulse:  [] 104  Resp:  [14-23] 19  SpO2:  [95 %-100 %] 95 %  BP: ()/(51-71) 102/56     Weight: (!) 142.9 kg (315 lb)  Body mass index is 49.34 kg/m².    Physical Exam   Constitutional: He is oriented to person, place, and time. He appears well-developed and well-nourished. No distress.   HENT:   Head: Normocephalic and atraumatic.   Mouth/Throat: No oropharyngeal exudate.   Eyes: EOM are normal. Pupils are equal, round, and reactive to light. No scleral icterus.   Neck: Normal range of motion. Neck supple.   Cardiovascular: Regular rhythm. Tachycardia present.   Murmur heard.  Pulmonary/Chest: Effort normal. He has wheezes. He has no rales.   Abdominal: Soft. Bowel sounds are normal. He exhibits distension, fluid wave and ascites. He exhibits no mass. There is no tenderness. There is no rebound and no guarding.   Musculoskeletal: Normal range of motion. He exhibits edema (BLE, legs wrapped in ACE). He exhibits no tenderness.   Lymphadenopathy:     He has no cervical adenopathy.   Neurological: He is alert and oriented to person, place, and time. No cranial nerve deficit or sensory deficit.   Skin: Skin is warm and dry. Capillary refill takes less than 2 seconds.   Scattered ecchymoses   Psychiatric: He has a normal mood and affect. His behavior is normal. Thought content normal.         CRANIAL NERVES     CN III, IV, VI   Pupils are equal, round, and reactive to light.  Extraocular motions are normal.         Significant Labs:   CBC:   Recent Labs   Lab 03/12/19 2109   WBC 7.67   HGB 9.2*   HCT 28.7*   *     CMP:   Recent Labs   Lab 03/12/19 2109   *   K 5.1      CO2 21*   *   BUN 35*   CREATININE 1.6*   CALCIUM 7.6*   PROT 5.5*   ALBUMIN 1.6*   BILITOT 0.4   ALKPHOS 128   AST 20   ALT 14   ANIONGAP 4*   EGFRNONAA 44.9*     Lipase: No results for input(s): LIPASE in the last 48 hours.  Troponin:   Recent Labs   Lab 03/12/19 2109 03/12/19 2348   TROPONINI <0.006 <0.006     All pertinent labs within the past 24 hours have been reviewed.    Significant Imaging: I have reviewed all pertinent imaging results/findings within the past 24 hours.    Assessment/Plan:     * Ascites    Shortness of breath    Patient with known cirrhosis and systolic heart failure presenting with worsening SOB, ascites, and LE edema for 4 weeks.  - CTA + ascites and edema  - IR consulted for para, orders placed  - NPO  - strict Is/Os  - daily weights  - hold ASA     ELIEZER (acute kidney injury)    Baseline Cr 1.0-1.2  - Cr 1.6 on admission  - daily BMP  - hold ACE and diuretics  - unclear if hepatorenal etiology     DM type 2 with diabetic peripheral neuropathy    - hold home insulin  - low dose SSI while NPO     Chronic combined systolic and diastolic heart failure    - holding diuretics in setting of ELIEZER  - strict Is/Os, daily weights  - holding ACE     Essential hypertension    - hold home BP meds as hypotensive     Chronic pancreatitis    - continue enzymes  - CT stable     Coronary artery disease due to calcified coronary lesion    - reports history of 5 stents with CABG at  in 2017  - scheduled for PET stress next week to evaluate patency  - EKG without ischemic changes  - troponin negative x 2       VTE Risk Mitigation (From admission, onward)        Ordered     IP VTE HIGH RISK PATIENT  Once      03/13/19 0310     Place BRANNON hose  Until discontinued      03/13/19 0310     Place sequential compression device   Until discontinued      03/13/19 0310             Yasmani Nguyen PA-C  Department of Hospital Medicine   [facility]

## 2019-03-13 NOTE — PLAN OF CARE
Problem: Adult Inpatient Plan of Care  Goal: Plan of Care Review  Outcome: Ongoing (interventions implemented as appropriate)  Pt with no falls or injuries this shift. Blood sugars ac+hs. No s/s hypo or hyperglycemia this shift. Pt afebrile, dressing intact to procedural site right abd. No s/s infection.

## 2019-03-13 NOTE — ASSESSMENT & PLAN NOTE
Baseline Cr 1.0-1.2  - Cr 1.6 on admission  -1.3--resolved  - daily BMP  - hold ACE and diuretics  - unclear if hepatorenal etiology

## 2019-03-13 NOTE — NURSING
Received report from from Reba MARIO in ER and from RN in IR. Pt arrived to [facility] 3078 via stretcher. Pt accompanied by transport associate. Pt alert and oriented x4. Dressing dry and intact to right abd. Pt denies pain. Pt denies SOB. Pt up in recliner. Wheels locked. Call light within pt reach.

## 2019-03-13 NOTE — SUBJECTIVE & OBJECTIVE
Past Medical History:   Diagnosis Date    Alcohol abuse     Anasarca 1/28/2019    Arthropathy associated with neurological disorder 9/2/2015    Atherosclerosis     Charcot foot due to diabetes mellitus     Chronic combined systolic and diastolic heart failure 01/29/2019 1-28-19 Left VentricleModerate decreased ejection fraction at 30%. Normal left ventricular cavity size. Normal wall thickness observed. Grade I (mild) left ventricular diastolic dysfunction consistent with impaired relaxation. Normal left atrial pressure. Moderate, global hypokinesis(see wall scoring diagram). Right VentricleNormal cavity size, wall thickness and ejection fraction. Wall motion n    Chronic pancreatitis 1/28/2019    Cirrhosis of liver with ascites 1/28/2019    Colon polyps     approx 5 yrs ago    Coronary artery disease due to calcified coronary lesion 05/08/2015    5 stents on ASA      Diabetic polyneuropathy associated with type 2 diabetes mellitus 9/2/2015    Diverticulosis 1/28/2019    DM type 2 with diabetic peripheral neuropathy 2/4/2019    Essential hypertension 1/28/2019    Former smoker 8/26/2015    Healed ulcer of left foot on examination 6/20/2017    Hydrocele     approx 1.5 yrs ago    Hypoalbuminemia 2/4/2019    Lymphedema of both lower extremities 1/29/2019    Mixed hyperlipidemia 5/8/2015    Morbid obesity with BMI of 50.0-59.9, adult 5/8/2015    Onychomycosis of multiple toenails with type 2 diabetes mellitus and peripheral neuropathy 6/20/2017    Other cirrhosis of liver     1-28-19 Liver has a cirrhotic morphology with no focal lesions.  Significant interval increase in ascites when compared to prior exam which may account for patient's abdominal distension.  Hypodense air-fluid collection along the body of the pancreas which is slightly smaller when compared to prior CT.  Findings may relate to pancreatic necrosis with pancreatic pseudocysts with infected pseudocyst    Perianal cyst      approx 2 yrs ago    Pseudocyst of pancreas 1/28/2019 1-28-19 Liver has a cirrhotic morphology with no focal lesions.  Significant interval increase in ascites when compared to prior exam which may account for patient's abdominal distension.  Hypodense air-fluid collection along the body of the pancreas which is slightly smaller when compared to prior CT.  Findings may relate to pancreatic necrosis with pancreatic pseudocysts with infected pseudocyst    Skin cancer     skin cancer    Sleep apnea 8/26/2015    Status post bariatric surgery 1/11/2016    Type 2 diabetes mellitus, with long-term current use of insulin 5/8/2015       Past Surgical History:   Procedure Laterality Date    ANGIOPLASTY      total x5 stents    COLONOSCOPY N/A 10/6/2015    Performed by [facility]khar Richards [facility] at [facility] ENDO (2ND FLR)    CORONARY ARTERY BYPASS GRAFT  2017    x3    CYST REMOVAL      GASTRECTOMY      GASTRECTOMY-SLEEVE-LAPAROSCOPIC - [id number] N/A 12/22/2015    Performed by Micheal Villavicencio Jr., MD at [facility] OR 2ND FLR    KNEE ARTHROSCOPY      perianal surgery      perianal cyst removed       Review of patient's allergies indicates:  No Known Allergies    No current facility-administered medications on file prior to encounter.      Current Outpatient Medications on File Prior to Encounter   Medication Sig    aspirin 325 MG tablet Take 81 mg by mouth once daily.    atorvastatin (LIPITOR) 40 MG tablet Take 1 tablet (40 mg total) by mouth once daily.    cyanocobalamin, vitamin B-12, 500 mcg Subl Place 1 tablet under the tongue every Monday.     furosemide (LASIX) 40 MG tablet Take 1 tablet (40 mg total) by mouth once daily.    insulin aspart U-100 (NOVOLOG) 100 unit/mL injection Inject 4 Units into the skin 3 (three) times daily before meals.    insulin detemir (LEVEMIR FLEXPEN SUBQ) Inject 12 Units into the skin once daily.     lipase-protease-amylase (CREON) 36,000-114,000- 180,000 unit CpDR Take 1 capsule by mouth 3 (three)  times daily.    metoprolol succinate (TOPROL-XL) 100 MG 24 hr tablet Take 1 tablet (100 mg total) by mouth once daily.    omeprazole (PRILOSEC) 40 MG capsule Take 40 mg by mouth once daily.    ONETOUCH ULTRA TEST Strp 4 (four) times daily. Use to test blood sugar four times a day.    ramipril (ALTACE) 2.5 MG capsule Take 1 capsule (2.5 mg total) by mouth once daily.    spironolactone (ALDACTONE) 100 MG tablet Take 1 tablet (100 mg total) by mouth once daily.    tamsulosin (FLOMAX) 0.4 mg Cap Take 1 capsule by mouth once daily. Pt has not started taking as of 19.    vitamin D (VITAMIN D3) 1000 units Tab Take 1 tablet (1,000 Units total) by mouth once daily.     Family History     Problem Relation (Age of Onset)    Cancer Mother, Father, Paternal Grandfather, Brother    Diabetes Maternal Grandmother    Heart disease Father    No Known Problems Paternal Grandmother    Obesity Sister    Parkinsonism Brother    Stroke Maternal Grandfather        Tobacco Use    Smoking status: Former Smoker     Packs/day: 2.00     Years: 30.00     Pack years: 60.00     Types: Cigarettes     Last attempt to quit: 2005     Years since quittin.1    Smokeless tobacco: Never Used   Substance and Sexual Activity    Alcohol use: No     Comment: started ~, reports 1 shot daily, max 3 shots daily, vague about alcohol consumption. Last drink 2018    Drug use: No    Sexual activity: Not on file     Review of Systems   Constitutional: Positive for activity change. Negative for chills and fever.   HENT: Negative for trouble swallowing.    Eyes: Negative for photophobia and visual disturbance.   Respiratory: Positive for chest tightness, shortness of breath and wheezing. Negative for cough.    Cardiovascular: Positive for chest pain (with exertion), palpitations and leg swelling.   Gastrointestinal: Positive for abdominal distention, constipation and diarrhea. Negative for abdominal pain, nausea and vomiting.    Genitourinary: Negative for dysuria, hematuria and urgency.   Musculoskeletal: Negative for back pain and neck pain.   Skin: Negative for rash and wound.   Neurological: Negative for dizziness, facial asymmetry, speech difficulty and light-headedness.   Psychiatric/Behavioral: Negative for agitation and confusion. The patient is not nervous/anxious.      Objective:     Vital Signs (Most Recent):  Temp: 97.4 °F (36.3 °C) (03/12/19 1939)  Pulse: 104 (03/13/19 0231)  Resp: 19 (03/13/19 0231)  BP: (!) 102/56 (03/13/19 0231)  SpO2: 95 % (03/13/19 0231) Vital Signs (24h Range):  Temp:  [97.4 °F (36.3 °C)] 97.4 °F (36.3 °C)  Pulse:  [] 104  Resp:  [14-23] 19  SpO2:  [95 %-100 %] 95 %  BP: ()/(51-71) 102/56     Weight: (!) 142.9 kg (315 lb)  Body mass index is 49.34 kg/m².    Physical Exam   Constitutional: He is oriented to person, place, and time. He appears well-developed and well-nourished. No distress.   HENT:   Head: Normocephalic and atraumatic.   Mouth/Throat: No oropharyngeal exudate.   Eyes: EOM are normal. Pupils are equal, round, and reactive to light. No scleral icterus.   Neck: Normal range of motion. Neck supple.   Cardiovascular: Regular rhythm. Tachycardia present.   Murmur heard.  Pulmonary/Chest: Effort normal. He has wheezes. He has no rales.   Abdominal: Soft. Bowel sounds are normal. He exhibits distension, fluid wave and ascites. He exhibits no mass. There is no tenderness. There is no rebound and no guarding.   Musculoskeletal: Normal range of motion. He exhibits edema (BLE, legs wrapped in ACE). He exhibits no tenderness.   Lymphadenopathy:     He has no cervical adenopathy.   Neurological: He is alert and oriented to person, place, and time. No cranial nerve deficit or sensory deficit.   Skin: Skin is warm and dry. Capillary refill takes less than 2 seconds.   Scattered ecchymoses   Psychiatric: He has a normal mood and affect. His behavior is normal. Thought content normal.          CRANIAL NERVES     CN III, IV, VI   Pupils are equal, round, and reactive to light.  Extraocular motions are normal.        Significant Labs:   CBC:   Recent Labs   Lab 03/12/19 2109   WBC 7.67   HGB 9.2*   HCT 28.7*   *     CMP:   Recent Labs   Lab 03/12/19 2109   *   K 5.1      CO2 21*   *   BUN 35*   CREATININE 1.6*   CALCIUM 7.6*   PROT 5.5*   ALBUMIN 1.6*   BILITOT 0.4   ALKPHOS 128   AST 20   ALT 14   ANIONGAP 4*   EGFRNONAA 44.9*     Lipase: No results for input(s): LIPASE in the last 48 hours.  Troponin:   Recent Labs   Lab 03/12/19 2109 03/12/19  2348   TROPONINI <0.006 <0.006     All pertinent labs within the past 24 hours have been reviewed.    Significant Imaging: I have reviewed all pertinent imaging results/findings within the past 24 hours.

## 2019-03-13 NOTE — PROGRESS NOTES
[facility] Hospital Medicine  Progress Note    Patient Name: Jian Arrieta  MRN: [id number]  Patient Class: OP- Observation   Admission Date: 3/12/2019  Length of Stay: 0 days  Attending Physician: SELENA Castillo MD  Primary Care Provider: Samir Mahmood MD    [facility] Medicine Team: [facility] F Yessica Dc NP    Subjective:     Principal Problem:Ascites    HPI:  Patient is a 62yo male with a PMHx of CAD (s/p PCI with 5 stents) with subsequent CABG x 3v (2017 at ), HLD, DM2, EBRHANE on CPAP, EtOh cirrhosis being admitted to observation for shortness of breath due to ascites. Patient reports worsening shortness of breath for the last 4 weeks with associated LE edema and belly distension. He reports having a therapeutic paracentesis 4 weeks ago which removed 5L of fluid.  His body swelling and SOB improved temporarily then started to worsen again. Now he is SOB with minimal exertion. Patient at his baseline is able to walk at his house from one end to the other. Now he is unable to ambulate three steps without getting SOB. He reports associated exertional chest pain feels like heaviness. He denies LOC, orthopnea and PND. He denies fever, cough, chills, rigors and weight gain. Patient states chronic constipation and hard stool and relates it to iron intake. His wife reports that sometime he becomes confused. He reports compliance to his medications.    In the ED, vitals stable. Intake labs remarkable for Na 130, Cr 1.6. Troponin 0.006 x 2. CTA showed abdominal ascites and stable pancreatic findings.    Hospital Course:  Patient admitted for paracentesis. 10.8L removed. Liver us Cirrhotic appearing liver with sequela of portal hypertension including severe volume ascites and splenomegaly. Hepatology to see patient in the morning.    Interval History: Patient much improved after paracentesis. States recent debility related to increasing volume overload making it difficult for him to ambulate.  PT/OT. Patient updated on plan and agreed.     Review of Systems   Constitutional: Positive for activity change. Negative for chills and fever.   HENT: Negative for trouble swallowing.    Eyes: Negative for photophobia and visual disturbance.   Respiratory: Positive for shortness of breath (improved). Negative for cough, chest tightness and wheezing.    Cardiovascular: Positive for leg swelling. Negative for chest pain (with exertion) and palpitations.   Gastrointestinal: Positive for abdominal distention (much improved). Negative for abdominal pain, constipation, diarrhea, nausea and vomiting.   Genitourinary: Negative for dysuria, hematuria and urgency.   Musculoskeletal: Negative for back pain and neck pain.   Skin: Negative for rash and wound.   Neurological: Positive for weakness. Negative for dizziness, facial asymmetry, speech difficulty and light-headedness.   Psychiatric/Behavioral: Negative for agitation and confusion. The patient is not nervous/anxious.      Objective:     Vital Signs (Most Recent):  Temp: 96.3 °F (35.7 °C) (03/13/19 1556)  Pulse: 107 (03/13/19 1556)  Resp: 18 (03/13/19 1556)  BP: (!) 95/51 (03/13/19 1556)  SpO2: 100 % (03/13/19 1556) Vital Signs (24h Range):  Temp:  [96.3 °F (35.7 °C)-98 °F (36.7 °C)] 96.3 °F (35.7 °C)  Pulse:  [] 107  Resp:  [14-23] 18  SpO2:  [95 %-100 %] 100 %  BP: ()/(51-90) 95/51     Weight: 134.6 kg (296 lb 13.6 oz)  Body mass index is 46.49 kg/m².  No intake or output data in the 24 hours ending 03/13/19 1649   Physical Exam   Constitutional: He is oriented to person, place, and time. He appears well-developed and well-nourished. No distress.   HENT:   Head: Normocephalic and atraumatic.   Mouth/Throat: No oropharyngeal exudate.   Eyes: No scleral icterus.   Cardiovascular: Normal rate and regular rhythm.   Murmur heard.  Pulmonary/Chest: Effort normal. He has no wheezes. He has no rales.   Abdominal: Soft. Bowel sounds are normal. He exhibits  distension (improved), fluid wave and ascites. He exhibits no mass. There is no tenderness. There is no rebound and no guarding.   Musculoskeletal: Normal range of motion. He exhibits edema (BLE, legs wrapped in ACE). He exhibits no tenderness.   Neurological: He is alert and oriented to person, place, and time. No cranial nerve deficit or sensory deficit.   Skin: Skin is warm and dry. Capillary refill takes less than 2 seconds.   Scattered ecchymoses   Psychiatric: He has a normal mood and affect. His behavior is normal. Thought content normal.       Significant Labs:   CBC:   Recent Labs   Lab 03/12/19 2109 03/13/19 0356   WBC 7.67 6.50   HGB 9.2* 9.3*   HCT 28.7* 28.6*   * 324     CMP:   Recent Labs   Lab 03/12/19 2109 03/13/19 0356   * 133*   K 5.1 4.2    108   CO2 21* 19*   * 210*   BUN 35* 33*   CREATININE 1.6* 1.3   CALCIUM 7.6* 7.3*   PROT 5.5* 5.3*   ALBUMIN 1.6* 1.5*   BILITOT 0.4 0.4   ALKPHOS 128 128   AST 20 16   ALT 14 14   ANIONGAP 4* 6*   EGFRNONAA 44.9* 57.7*     Urine Studies:   Recent Labs   Lab 03/13/19 0356   COLORU Yellow   APPEARANCEUA Clear   PHUR 5.0   SPECGRAV >=1.030*   PROTEINUA Negative   GLUCUA Negative   KETONESU Negative   BILIRUBINUA Negative   OCCULTUA Negative   NITRITE Negative   LEUKOCYTESUR Negative       Significant Imaging: I have reviewed all pertinent imaging results/findings within the past 24 hours.    Assessment/Plan:      * Ascites    Shortness of breath    Patient with known cirrhosis and systolic heart failure presenting with worsening SOB, ascites, and LE edema for 4 weeks.  - CTA + ascites and edema  -IR paracentesis with 10.8L off  - Liver US Cirrhotic appearing liver with sequela of portal hypertension including severe volume ascites and splenomegaly  - hepatology to see patient  - Patient states increased debility due to weight gain and SOB--PT/OT to evaluate  - strict Is/Os  - daily weights  - hold ASA     ELIEZER (acute kidney injury)     Baseline Cr 1.0-1.2  - Cr 1.6 on admission  -1.3--resolved  - daily BMP  - hold ACE and diuretics  - unclear if hepatorenal etiology     DM type 2 with diabetic peripheral neuropathy    - hold home insulin  - low dose SSI while NPO     Chronic combined systolic and diastolic heart failure    - holding diuretics in setting of ELIEZER  - strict Is/Os, daily weights  - holding ACE     Essential hypertension    - hold home BP meds as hypotensive     Chronic pancreatitis    - continue enzymes  - CT stable     Coronary artery disease due to calcified coronary lesion    - reports history of 5 stents with CABG at  in 2017  - scheduled for PET stress next week to evaluate patency  - EKG without ischemic changes  - troponin negative x 2       VTE Risk Mitigation (From admission, onward)        Ordered     IP VTE HIGH RISK PATIENT  Once      03/13/19 1409     Place sequential compression device  Until discontinued      03/13/19 1409     Place BRANNON hose  Until discontinued      03/13/19 0310     Place sequential compression device  Until discontinued      03/13/19 0310              Yessica Dc NP  Department of Hospital Medicine   [facility]

## 2019-03-13 NOTE — ASSESSMENT & PLAN NOTE
- reports history of 5 stents with CABG at  in 2017  - scheduled for PET stress next week to evaluate patency  - EKG without ischemic changes  - troponin negative x 2

## 2019-03-13 NOTE — ED PROVIDER NOTES
Encounter Date: 3/12/2019       History     Chief Complaint   Patient presents with    Chest Pain     Pt reports midsternal intermittent chest pain. Pt has had 5 cardiac stents and triple bypass. Pt took 2 Sl nitro two hours ago with relief.      63 year old male patient with medical issues of CAD s/p PCI (x5) with subsequent CABG x3v (~2017 at ), HLD, DM2, BERHANE on CPAP, R knee OA, Charcot foot, EtOH cirrhosis, chronic pancreatitis and normocytic anemia.    Presents with worsening SOB for the last 4 weeks    Patient states he had ascitic tapping of 5 L 4 weeks ago. His body swelling and RANDY improved temporarily then started to worsen again now he has SOB to minimal exertion. Patient at his baseline is able to walk at his house from one end to the other. But now he could not walk three steps without getting SOB. He has LE swelling and increased abdominal distension. He reports associated exertional chest pain feels like heaviness. He denies LOC, orthopnea and PND.  Denies fever, cough, chills, rigors and weight gain. Patient states chronic constipation and hard stool and relates it to iron intake  .He also denies N/V, dizziness, light headedness and seizures. His wife reports that sometime he becomes confused  Denies eye, ear and urinary symptoms  Reports compliance to his medications    Patient is not on home oxygen    Patient used to smoke and used to work as a  he states that he exposed to smokes.            Review of patient's allergies indicates:  No Known Allergies  Past Medical History:   Diagnosis Date    Alcohol abuse     Anasarca 1/28/2019    Arthropathy associated with neurological disorder 9/2/2015    Atherosclerosis     Charcot foot due to diabetes mellitus     Chronic combined systolic and diastolic heart failure 01/29/2019 1-28-19 Left VentricleModerate decreased ejection fraction at 30%. Normal left ventricular cavity size. Normal wall thickness observed. Grade I (mild) left  ventricular diastolic dysfunction consistent with impaired relaxation. Normal left atrial pressure. Moderate, global hypokinesis(see wall scoring diagram). Right VentricleNormal cavity size, wall thickness and ejection fraction. Wall motion n    Chronic pancreatitis 1/28/2019    Cirrhosis of liver with ascites 1/28/2019    Colon polyps     approx 5 yrs ago    Coronary artery disease due to calcified coronary lesion 05/08/2015    5 stents on ASA      Diabetic polyneuropathy associated with type 2 diabetes mellitus 9/2/2015    Diverticulosis 1/28/2019    DM type 2 with diabetic peripheral neuropathy 2/4/2019    Essential hypertension 1/28/2019    Former smoker 8/26/2015    Healed ulcer of left foot on examination 6/20/2017    Hydrocele     approx 1.5 yrs ago    Hypoalbuminemia 2/4/2019    Lymphedema of both lower extremities 1/29/2019    Mixed hyperlipidemia 5/8/2015    Morbid obesity with BMI of 50.0-59.9, adult 5/8/2015    Onychomycosis of multiple toenails with type 2 diabetes mellitus and peripheral neuropathy 6/20/2017    Other cirrhosis of liver     1-28-19 Liver has a cirrhotic morphology with no focal lesions.  Significant interval increase in ascites when compared to prior exam which may account for patient's abdominal distension.  Hypodense air-fluid collection along the body of the pancreas which is slightly smaller when compared to prior CT.  Findings may relate to pancreatic necrosis with pancreatic pseudocysts with infected pseudocyst    Perianal cyst     approx 2 yrs ago    Pseudocyst of pancreas 1/28/2019 1-28-19 Liver has a cirrhotic morphology with no focal lesions.  Significant interval increase in ascites when compared to prior exam which may account for patient's abdominal distension.  Hypodense air-fluid collection along the body of the pancreas which is slightly smaller when compared to prior CT.  Findings may relate to pancreatic necrosis with pancreatic pseudocysts with  infected pseudocyst    Skin cancer     skin cancer    Sleep apnea 2015    Status post bariatric surgery 2016    Type 2 diabetes mellitus, with long-term current use of insulin 2015     Past Surgical History:   Procedure Laterality Date    ANGIOPLASTY      total x5 stents    COLONOSCOPY N/A 10/6/2015    Performed by Shekhar Richards MD at [facility] (2ND FLR)    CORONARY ARTERY BYPASS GRAFT  2017    x3    CYST REMOVAL      GASTRECTOMY      GASTRECTOMY-SLEEVE-LAPAROSCOPIC - 41525 N/A 2015    Performed by Micheal Villavicencio Jr., MD at [facility] OR 2ND FLR    KNEE ARTHROSCOPY      perianal surgery      perianal cyst removed     Family History   Problem Relation Age of Onset    Cancer Mother     Cancer Father     Heart disease Father     Obesity Sister     Parkinsonism Brother     No Known Problems Paternal Grandmother     Cancer Paternal Grandfather     Cancer Brother     Diabetes Maternal Grandmother     Stroke Maternal Grandfather     Cirrhosis Neg Hx      Social History     Tobacco Use    Smoking status: Former Smoker     Packs/day: 2.00     Years: 30.00     Pack years: 60.00     Types: Cigarettes     Last attempt to quit: 2005     Years since quittin.1    Smokeless tobacco: Never Used   Substance Use Topics    Alcohol use: No     Comment: started ~, reports 1 shot daily, max 3 shots daily, vague about alcohol consumption. Last drink 2018    Drug use: No     Review of Systems   Constitutional: Positive for activity change, fatigue and unexpected weight change (Weight loss 50 pounds after gastric sleeve). Negative for chills and diaphoresis.   HENT: Negative for sore throat.    Eyes: Negative for photophobia, pain, discharge, redness, itching and visual disturbance.   Respiratory: Positive for shortness of breath. Negative for cough and wheezing.    Cardiovascular: Positive for chest pain and leg swelling.   Gastrointestinal: Positive for constipation. Negative for  diarrhea, nausea and vomiting.   Genitourinary: Negative for dysuria and hematuria.   Neurological: Negative for dizziness, seizures, syncope and light-headedness.   Psychiatric/Behavioral: Positive for confusion.       Physical Exam     Initial Vitals [03/12/19 1939]   BP Pulse Resp Temp SpO2   106/60 98 14 97.4 °F (36.3 °C) 99 %      MAP       --         Physical Exam    Constitutional: He appears well-developed and well-nourished. He is not diaphoretic. No distress.   HENT:   Mouth/Throat: Oropharynx is clear and moist.   Eyes: Conjunctivae are normal. Right eye exhibits no discharge. Left eye exhibits no discharge. No scleral icterus.   Neck: Neck supple. No JVD present.   Cardiovascular: Normal rate and regular rhythm. Exam reveals no gallop and no friction rub.    No murmur heard.  Pulmonary/Chest: Breath sounds normal. No respiratory distress. He has no wheezes. He has no rhonchi. He has no rales.   Bilateral decrease breath sounds   Abdominal: Soft. Bowel sounds are normal. He exhibits distension. There is no tenderness.   Musculoskeletal: He exhibits edema. He exhibits no tenderness.   Neurological: He is alert and oriented to person, place, and time. GCS score is 15. GCS eye subscore is 4. GCS verbal subscore is 5. GCS motor subscore is 6.   Skin: Skin is warm and dry. No erythema.   Psychiatric: He has a normal mood and affect. Thought content normal.         ED Course   Procedures  Labs Reviewed   CBC W/ AUTO DIFFERENTIAL - Abnormal; Notable for the following components:       Result Value    RBC 3.13 (*)     Hemoglobin 9.2 (*)     Hematocrit 28.7 (*)     Platelets 399 (*)     All other components within normal limits    Narrative:     ONE LAVENDER SHARED   COMPREHENSIVE METABOLIC PANEL - Abnormal; Notable for the following components:    Sodium 130 (*)     CO2 21 (*)     Glucose 255 (*)     BUN, Bld 35 (*)     Creatinine 1.6 (*)     Calcium 7.6 (*)     Total Protein 5.5 (*)     Albumin 1.6 (*)     Anion  Gap 4 (*)     eGFR if  51.9 (*)     eGFR if non  44.9 (*)     All other components within normal limits    Narrative:     ONE LAVENDER SHARED   COMPREHENSIVE METABOLIC PANEL - Abnormal; Notable for the following components:    Sodium 133 (*)     CO2 19 (*)     Glucose 210 (*)     BUN, Bld 33 (*)     Calcium 7.3 (*)     Total Protein 5.3 (*)     Albumin 1.5 (*)     Anion Gap 6 (*)     eGFR if non  57.7 (*)     All other components within normal limits   CBC W/ AUTO DIFFERENTIAL - Abnormal; Notable for the following components:    RBC 3.15 (*)     Hemoglobin 9.3 (*)     Hematocrit 28.6 (*)     MPV 9.1 (*)     All other components within normal limits   URINALYSIS, REFLEX TO URINE CULTURE - Abnormal; Notable for the following components:    Specific Gravity, UA >=1.030 (*)     All other components within normal limits    Narrative:     Preferred Collection Type->Urine, Clean Catch one gray one yellow   POCT GLUCOSE - Abnormal; Notable for the following components:    POCT Glucose 209 (*)     All other components within normal limits   TROPONIN I    Narrative:     ONE LAVENDER SHARED   B-TYPE NATRIURETIC PEPTIDE    Narrative:     ONE LAVENDER SHARED   TROPONIN I   AMMONIA   PROTIME-INR     EKG Readings: (Independently Interpreted)   Initial Reading: No STEMI. Rhythm: Normal Sinus Rhythm. Heart Rate: 99 bpm. Ectopy: PVCs. Conduction: Normal. ST Segments: Normal ST Segments.       Imaging Results          IR Paracentesis with Imaging (Final result)  Result time 03/14/19 12:31:40    Final result by Mike Hinds MD (03/14/19 12:31:40)                 Impression:      Ultrasound[place] [address]  mL of serous fluid.    Attestation:    Signer name: Mike Hinds MD    I attest that I supervised the procedure and was immediately available. I reviewed the stored images and agree with the report as written    Electronically signed by resident:  Mike Hinds MD  Date:    03/13/2019  Time:    19:46    Electronically signed by: Mike Hinds MD  Date:    03/14/2019  Time:    12:31             Narrative:    EXAMINATION:  Ultrasound-guided Paracentesis    Procedural Personnel    Attending physician(s): Mike Hinds MD    Fellow physician(s): None    Resident physician(s): None    Advanced practice provider(s): Annmarie Rayo NP    Pre-procedure diagnosis: Ascites    Post-procedure diagnosis: Same    Indication: Abdominal distention    Complications: No immediate complications.    PROCEDURAL SUMMARY:  Ultrasound-guided paracentesis    COMPARISON:  IR Paracentesis on 1/29/19    PROCEDURE:  Pre-procedure:    Consent: Informed consent for the procedure was obtained and time-out was performed prior to the procedure.    Preparation: The site was prepared and draped using maximal sterile barrier technique including cutaneous antisepsis.    Anesthesia/sedation:    Level of anesthesia/sedation: No sedation    Anesthesia/sedation administered by: Not applicable    Total intra-service sedation time (minutes): Not applicable    Limited abdominal ultrasound:    Limited abdominal ultrasound was performed.    Moderate ascites. A safe window for paracentesis was identified.    Paracentesis    Local anesthesia was administered. The peritoneal cavity was accessed and fluid return confirmed position. Ascites was drained.    Paracentesis access technique: Ultrasound to eben a suitable access site    Catheter placed: 5F Yueh    Closure:    The catheter was removed. A sterile bandage was applied.    Post-drainage ultrasound: Not performed    Additional Details:    Additional description of procedure: None    Equipment details: None    Specimens removed: Abdominal fluid    Estimated blood loss (mL): Less than 10                               US Liver with Doppler (xpd) (Final result)  Result time 03/13/19 11:54:14    Final result by Jeannette Fishman MD (03/13/19  11:54:14)                 Impression:      Cirrhotic appearing liver with sequela of portal hypertension including severe volume ascites and splenomegaly.    Hepatic arterial system is not visualized secondary to artifact from overlying ascites.    Electronically signed by resident: Roberto Millard  Date:    03/13/2019  Time:    11:27    Electronically signed by: Jeannette Fishman MD  Date:    03/13/2019  Time:    11:54             Narrative:    EXAMINATION:  US LIVER WITH DOPPLER    CLINICAL HISTORY:  ascites, unsure if cirrhotic or not    TECHNIQUE:  Duplex scan of the liver was performed using B-mode/gray scale imaging and Doppler spectral analysis and color flow.    COMPARISON:  CT abdomen pelvis 01/28/2019.    FINDINGS:  The liver measures 13.3 cm and demonstrates a nodular contour suggesting cirrhosis. No focal hepatic parenchymal abnormality. No intra or extrahepatic bile duct dilatation. The common duct is upper limits normal and measures 8 mm.    The middle, right, and left hepatic veins are patent and unremarkable. The main, right, and left branches of the portal vein demonstrate hepatopedal flow and are unremarkable.    The pancreas and visualized aorta are unremarkable. The gallbladder is unremarkable.  The spleen is enlarged and measures 13.4 x 5.1 cm.    The hepatic arterial system is not visualized likely secondary to artifact from overlying ascites.    The IVC is unremarkable. There is severe volume ascites..                               CTA Chest Non-Coronary (PE Study) (Final result)     Abnormal  Result time 03/13/19 01:38:56    Final result by Serafin Aranda MD (03/13/19 01:38:56)                 Impression:      1. No evidence of pulmonary thromboembolism.  2. Postsurgical changes of coronary artery bypass graft and sleeve gastrectomy.  3. Mild ground-glass attenuation of the lungs suggesting mild interstitial edema.  4. Cirrhosis and large volume abdominal ascites.  5.  Bilateral 4 mm  pulmonary micronodules.  For multiple solid nodules all <6 mm, Fleischner Society 2017 guidelines recommend no routine follow up for a low risk patient, or follow up with non-contrast chest CT at 12 months after discovery in a high risk patient.  6. Persistent air and fluid collection along the body of the pancreas without significant change from prior study.  Finding may again related to pancreatic necrosis or pseudocyst.  7. Additional findings as above.  This report was flagged in Epic as abnormal.    Electronically signed by resident: Jaime Rogers  Date:    03/13/2019  Time:    01:10    Electronically signed by: Serafin Aranda MD  Date:    03/13/2019  Time:    01:38             Narrative:    EXAMINATION:  CTA CHEST NON CORONARY    CLINICAL HISTORY:  Chest pain, acute, PE suspected, high pretest prob;    TECHNIQUE:  Low dose axial images, sagittal and coronal reformations were obtained from the thoracic inlet to the lung bases following the IV administration of 100 mL of Omnipaque 350.  Contrast timing was optimized to evaluate the pulmonary arteries.  MIP images were performed.    COMPARISON:  Radiograph 03/12/2019, 02/23/2019; CT 01/28/2018, 09/23/2015.    FINDINGS:  The vascular and soft tissues structures at the base of the neck are unremarkable.    There is a left-sided aortic arch with 3 branch vessels. The aorta is normal in caliber, contour, and course without significant calcific atherosclerosis.    Postsurgical changes of coronary artery bypass graft.  There is no cardiac enlargement or pericardial fluid.  There is calcific atherosclerosis of the coronary arteries.    Prominent right pretracheal lymph node without evidence of mediastinal, hilar, or axillary lymphadenopathy.    The pulmonary arteries distribute normally.  This study is adequate for the evaluation of pulmonary thromboembolism. There is no filling defect of the pulmonary arteries to suggest pulmonary thromboembolism.    The trachea is  midline and the proximal airways are patent.  The lungs are symetrically expanded without mass, consolidation, or pneumothorax.  Mild patchy ground-glass opacification throughout the lungs likely representing interstitial edema.  Right upper lobe nodule measuring 0.4 cm and left lower lobe nodule measuring 0.4 cm.  There is no pleural fluid.    The esophagus is normal in caliber and contour.  The imaged intra-abdominal structures demonstrate large volume ascites.  Nodular contour of the liver compatible with cirrhosis.  Granuloma present in the left hepatic lobe.  Postsurgical changes of sleeve gastrectomy.  Persistent air and fluid collection along the body of the pancreas without significant change from prior study.    The osseous structures demonstrate postsurgical changes of median sternotomy.  There are mild degenerative changes without acute fracture or bony destructive process.                               X-Ray Chest PA And Lateral (Final result)  Result time 03/12/19 22:07:19    Final result by Serafin Aranda MD (03/12/19 22:07:19)                 Impression:      No acute cardiopulmonary process.      Electronically signed by: Serafin Aranda MD  Date:    03/12/2019  Time:    22:07             Narrative:    EXAMINATION:  XR CHEST PA AND LATERAL    CLINICAL HISTORY:  Chest Pain;    TECHNIQUE:  PA and lateral views of the chest were performed.    COMPARISON:  February 23, 2019.    FINDINGS:  Sternotomy wires appear unchanged.  Lordotic positioning.    There is no consolidation, effusion, or pneumothorax.    Cardiomediastinal silhouette is unremarkable.    Regional osseous structures are unremarkable.                              X-Rays:   Independently Interpreted Readings:   Other Readings:  No acute cardiopulmonary process.    Medical Decision Making:   Initial Assessment:   63 Year old male patient HFrEF, liver cirrhosis, CAD S/P CABG presents with SOB  Differential Diagnosis:   - Acute on chronic HF  exacerbation: Reduced EF  - ACS: History of CAD. Presents with CP and SOB  - PE: Morbidly obese, difficulty ambulation with LE swealing  Clinical Tests:   Lab Tests: Ordered and Reviewed  The following lab test(s) were unremarkable: CBC, CMP, Troponin, BNP and Urinalysis  Radiological Study: Ordered and Reviewed  Medical Tests: Ordered and Reviewed  ED Management:  - Labs: Hb: 9 stable no drop . CMP:  Hyponatremia 130 Cr increased from 1.2 to 1.6 over a month. Troponin and BNP wnl  - CTA to rule out PE: No evidence of pulmonary thromboembolism. Postsurgical changes of coronary artery bypass graft and sleeve gastrectomy. Mild interstitial edema. Cirrhosis and large volume abdominal ascites. Persistent air and fluid collection along the body of the pancreas without significant change from prior study.  Finding may again related to pancreatic necrosis or pseudocyst.  - Admit to hospital medicine (Obs) as refractroy ascites.  Other:   I discussed test(s) with the performing physician.  I have discussed this case with another health care provider.              Attending Attestation:             Attending ED Notes:   I evaluated this patient with the resident physician and was involved in every aspect of the treatment rendered.  I had a face-to-face encounter with the patient and performed a physical examination.     The patient is a 63-year-old with a history of coronary artery disease, CABG, obstructive sleep apnea, congestive heart failure, liver cirrhosis, ascites.  He presented with progressively worsening shortness of breath over the course of weeks.  He was afebrile with stable vital signs.  He was not hypoxic.  He did have significant distention to his abdomen and peripheral edema. EKG was negative for arrhythmia and acute ischemic changes.  Chest radiograph was negative for acute processes.  Troponin and BNP were without elevation.  CT of the chest was negative for PE.  Patient's shortness of breath appears to be  related to abdominal distention from ascites.    I agree with the history, examination, workup, assessment, and plan as documented by the resident.     Timoteo Cortes III, M.D. - Attending             Clinical Impression:       ICD-10-CM ICD-9-CM   1. Alcoholic cirrhosis of liver with ascites K70.31 571.2   2. Chest pain R07.9 786.50   3. Tachycardia R00.0 785.0   4. Shortness of breath R06.02 786.05   5. Acute kidney injury N17.9 584.9   6. Abdominal distention R14.0 787.3         Disposition:   Disposition: Placed in Observation  Condition: Stable                        Jessi Slater MD  Resident  03/13/19 0221       Timoteo Cortes III, MD  03/15/19 0127

## 2019-03-13 NOTE — PROVIDER PROGRESS NOTES - EMERGENCY DEPT.
Encounter Date: 3/12/2019    ED Physician Progress Notes       SCRIBE NOTE: I, Sincere Mc, am scribing for, and in the presence of,  Jose Aviles MD.  Physician Statement: IJose MD, personally performed the services described in this documentation as scribed by Sincere Mc in my presence, and it is both accurate and complete.      EKG - STEMI Decision  Initial Reading: No STEMI present.

## 2019-03-13 NOTE — HOSPITAL COURSE
Patient admitted for paracentesis. 10.8L removed. Liver us cirrhotic appearing liver with sequela of portal hypertension including severe volume ascites and splenomegaly. Hepatology saw patient, assistance appreciated. H&H slowly decreasing. Patient blood consent obtained. Will transfuse. Continue albumin     Patient received second paracentesis with 6L off. Patient had an episode of chest pain after the procedure and was found to be in a fib (new onset). Troponin elevated to 3 and cardiology consulted. Patient initiated on ACS protocol. Rhythm spontaneously converted. Cardiology believes his troponin elevated because of demand ischemia due to a fib. Heparin continued and will bridge to coumadin. Hepatology aware.

## 2019-03-13 NOTE — PROGRESS NOTES
Paracentesis complete, 10.8Ls removed. Specimen sent to lab.250 ml of Albumin administered per protocol. Dressing applied to right abd puncture site, dressing clean dry and intact. Report called to Francine MARIO floor nurse.Pt to room via stretcher escorted by transport.

## 2019-03-14 LAB
ALBUMIN SERPL BCP-MCNC: 1.8 G/DL
ALP SERPL-CCNC: 95 U/L
ALT SERPL W/O P-5'-P-CCNC: 11 U/L
ANION GAP SERPL CALC-SCNC: 3 MMOL/L
AST SERPL-CCNC: 13 U/L
BASOPHILS # BLD AUTO: 0.03 K/UL
BASOPHILS NFR BLD: 0.6 %
BILIRUB SERPL-MCNC: 0.5 MG/DL
BUN SERPL-MCNC: 29 MG/DL
CALCIUM SERPL-MCNC: 7.5 MG/DL
CHLORIDE SERPL-SCNC: 107 MMOL/L
CO2 SERPL-SCNC: 23 MMOL/L
CREAT SERPL-MCNC: 1.1 MG/DL
DIFFERENTIAL METHOD: ABNORMAL
EOSINOPHIL # BLD AUTO: 0.1 K/UL
EOSINOPHIL NFR BLD: 1.3 %
ERYTHROCYTE [DISTWIDTH] IN BLOOD BY AUTOMATED COUNT: 13.8 %
EST. GFR  (AFRICAN AMERICAN): >60 ML/MIN/1.73 M^2
EST. GFR  (NON AFRICAN AMERICAN): >60 ML/MIN/1.73 M^2
GLUCOSE SERPL-MCNC: 113 MG/DL (ref 70–110)
GLUCOSE SERPL-MCNC: 254 MG/DL
HCT VFR BLD AUTO: 25.4 %
HGB BLD-MCNC: 8.3 G/DL
IMM GRANULOCYTES # BLD AUTO: 0.02 K/UL
IMM GRANULOCYTES NFR BLD AUTO: 0.4 %
INR PPP: 1.1
LYMPHOCYTES # BLD AUTO: 1.2 K/UL
LYMPHOCYTES NFR BLD: 24.2 %
MCH RBC QN AUTO: 29.6 PG
MCHC RBC AUTO-ENTMCNC: 32.7 G/DL
MCV RBC AUTO: 91 FL
MONOCYTES # BLD AUTO: 0.5 K/UL
MONOCYTES NFR BLD: 9.4 %
NEUTROPHILS # BLD AUTO: 3.1 K/UL
NEUTROPHILS NFR BLD: 64.1 %
NRBC BLD-RTO: 0 /100 WBC
PLATELET # BLD AUTO: 278 K/UL
PMV BLD AUTO: 9 FL
POCT GLUCOSE: 209 MG/DL (ref 70–110)
POCT GLUCOSE: 246 MG/DL (ref 70–110)
POCT GLUCOSE: 326 MG/DL (ref 70–110)
POCT GLUCOSE: 328 MG/DL (ref 70–110)
POTASSIUM SERPL-SCNC: 4.1 MMOL/L
PROT SERPL-MCNC: 4.4 G/DL
PROTHROMBIN TIME: 11.4 SEC
RBC # BLD AUTO: 2.8 M/UL
SODIUM SERPL-SCNC: 133 MMOL/L
WBC # BLD AUTO: 4.8 K/UL

## 2019-03-14 PROCEDURE — 36415 COLL VENOUS BLD VENIPUNCTURE: CPT

## 2019-03-14 PROCEDURE — 25000003 PHARM REV CODE 250: Performed by: NURSE PRACTITIONER

## 2019-03-14 PROCEDURE — 97161 PT EVAL LOW COMPLEX 20 MIN: CPT

## 2019-03-14 PROCEDURE — 25000003 PHARM REV CODE 250: Performed by: PHYSICIAN ASSISTANT

## 2019-03-14 PROCEDURE — 93010 ELECTROCARDIOGRAM REPORT: CPT | Mod: ,,, | Performed by: INTERNAL MEDICINE

## 2019-03-14 PROCEDURE — 93010 EKG 12-LEAD: ICD-10-PCS | Mod: ,,, | Performed by: INTERNAL MEDICINE

## 2019-03-14 PROCEDURE — G0378 HOSPITAL OBSERVATION PER HR: HCPCS

## 2019-03-14 PROCEDURE — 87040 BLOOD CULTURE FOR BACTERIA: CPT

## 2019-03-14 PROCEDURE — P9047 ALBUMIN (HUMAN), 25%, 50ML: HCPCS | Mod: JG | Performed by: NURSE PRACTITIONER

## 2019-03-14 PROCEDURE — 85025 COMPLETE CBC W/AUTO DIFF WBC: CPT

## 2019-03-14 PROCEDURE — 99215 PR OFFICE/OUTPT VISIT, EST, LEVL V, 40-54 MIN: ICD-10-PCS | Mod: ,,, | Performed by: INTERNAL MEDICINE

## 2019-03-14 PROCEDURE — 99215 OFFICE O/P EST HI 40 MIN: CPT | Mod: ,,, | Performed by: INTERNAL MEDICINE

## 2019-03-14 PROCEDURE — 63600175 PHARM REV CODE 636 W HCPCS: Performed by: PHYSICIAN ASSISTANT

## 2019-03-14 PROCEDURE — 97530 THERAPEUTIC ACTIVITIES: CPT

## 2019-03-14 PROCEDURE — 93005 ELECTROCARDIOGRAM TRACING: CPT

## 2019-03-14 PROCEDURE — 80053 COMPREHEN METABOLIC PANEL: CPT

## 2019-03-14 PROCEDURE — 85610 PROTHROMBIN TIME: CPT

## 2019-03-14 PROCEDURE — 63600175 PHARM REV CODE 636 W HCPCS: Mod: JG | Performed by: NURSE PRACTITIONER

## 2019-03-14 RX ORDER — ALBUMIN HUMAN 250 G/1000ML
25 SOLUTION INTRAVENOUS EVERY 6 HOURS
Status: DISCONTINUED | OUTPATIENT
Start: 2019-03-14 | End: 2019-03-18

## 2019-03-14 RX ORDER — SPIRONOLACTONE 100 MG/1
100 TABLET, FILM COATED ORAL DAILY
Status: DISCONTINUED | OUTPATIENT
Start: 2019-03-14 | End: 2019-03-21 | Stop reason: HOSPADM

## 2019-03-14 RX ORDER — FUROSEMIDE 40 MG/1
40 TABLET ORAL DAILY
Status: DISCONTINUED | OUTPATIENT
Start: 2019-03-14 | End: 2019-03-21 | Stop reason: HOSPADM

## 2019-03-14 RX ADMIN — FUROSEMIDE 40 MG: 40 TABLET ORAL at 02:03

## 2019-03-14 RX ADMIN — PANCRELIPASE 3 CAPSULE: 60000; 12000; 38000 CAPSULE, DELAYED RELEASE PELLETS ORAL at 08:03

## 2019-03-14 RX ADMIN — INSULIN ASPART 1 UNITS: 100 INJECTION, SOLUTION INTRAVENOUS; SUBCUTANEOUS at 11:03

## 2019-03-14 RX ADMIN — ALBUMIN HUMAN 25 G: 0.25 SOLUTION INTRAVENOUS at 11:03

## 2019-03-14 RX ADMIN — INSULIN ASPART 2 UNITS: 100 INJECTION, SOLUTION INTRAVENOUS; SUBCUTANEOUS at 08:03

## 2019-03-14 RX ADMIN — PANCRELIPASE 3 CAPSULE: 60000; 12000; 38000 CAPSULE, DELAYED RELEASE PELLETS ORAL at 12:03

## 2019-03-14 RX ADMIN — INSULIN ASPART 4 UNITS: 100 INJECTION, SOLUTION INTRAVENOUS; SUBCUTANEOUS at 12:03

## 2019-03-14 RX ADMIN — ALBUMIN HUMAN 25 G: 0.25 SOLUTION INTRAVENOUS at 06:03

## 2019-03-14 RX ADMIN — SPIRONOLACTONE 100 MG: 100 TABLET, FILM COATED ORAL at 05:03

## 2019-03-14 RX ADMIN — ATORVASTATIN CALCIUM 40 MG: 20 TABLET, FILM COATED ORAL at 08:03

## 2019-03-14 RX ADMIN — INSULIN ASPART 4 UNITS: 100 INJECTION, SOLUTION INTRAVENOUS; SUBCUTANEOUS at 05:03

## 2019-03-14 NOTE — PLAN OF CARE
Problem: Adult Inpatient Plan of Care  Goal: Plan of Care Review  Outcome: Ongoing (interventions implemented as appropriate)  poc reviewed with patient, vss, call light within reach, no questions or concerns will cont to monitor.

## 2019-03-14 NOTE — SUBJECTIVE & OBJECTIVE
Review of Systems   Constitutional: Positive for fatigue. Negative for activity change, appetite change, chills, diaphoresis, fever and unexpected weight change.   HENT: Negative for trouble swallowing and voice change.    Eyes: Negative for photophobia, pain, discharge, redness, itching and visual disturbance.   Respiratory: Negative for cough and shortness of breath.    Cardiovascular: Positive for leg swelling. Negative for palpitations.   Gastrointestinal: Positive for abdominal distention. Negative for abdominal pain, anal bleeding, blood in stool, constipation, diarrhea, nausea, rectal pain and vomiting.   Genitourinary: Negative for dysuria, frequency, hematuria and urgency.   Musculoskeletal: Negative for back pain and neck pain.   Neurological: Negative for dizziness, syncope, weakness and light-headedness.       Past Medical History:   Diagnosis Date    Alcohol abuse     Anasarca 1/28/2019    Arthropathy associated with neurological disorder 9/2/2015    Atherosclerosis     Charcot foot due to diabetes mellitus     Chronic combined systolic and diastolic heart failure 01/29/2019 1-28-19 Left VentricleModerate decreased ejection fraction at 30%. Normal left ventricular cavity size. Normal wall thickness observed. Grade I (mild) left ventricular diastolic dysfunction consistent with impaired relaxation. Normal left atrial pressure. Moderate, global hypokinesis(see wall scoring diagram). Right VentricleNormal cavity size, wall thickness and ejection fraction. Wall motion n    Chronic pancreatitis 1/28/2019    Cirrhosis of liver with ascites 1/28/2019    Colon polyps     approx 5 yrs ago    Coronary artery disease due to calcified coronary lesion 05/08/2015    5 stents on ASA      Diabetic polyneuropathy associated with type 2 diabetes mellitus 9/2/2015    Diverticulosis 1/28/2019    DM type 2 with diabetic peripheral neuropathy 2/4/2019    Essential hypertension 1/28/2019    Former smoker  8/26/2015    Healed ulcer of left foot on examination 6/20/2017    Hydrocele     approx 1.5 yrs ago    Hypoalbuminemia 2/4/2019    Lymphedema of both lower extremities 1/29/2019    Mixed hyperlipidemia 5/8/2015    Morbid obesity with BMI of 50.0-59.9, adult 5/8/2015    Onychomycosis of multiple toenails with type 2 diabetes mellitus and peripheral neuropathy 6/20/2017    Other cirrhosis of liver     1-28-19 Liver has a cirrhotic morphology with no focal lesions.  Significant interval increase in ascites when compared to prior exam which may account for patient's abdominal distension.  Hypodense air-fluid collection along the body of the pancreas which is slightly smaller when compared to prior CT.  Findings may relate to pancreatic necrosis with pancreatic pseudocysts with infected pseudocyst    Perianal cyst     approx 2 yrs ago    Pseudocyst of pancreas 1/28/2019 1-28-19 Liver has a cirrhotic morphology with no focal lesions.  Significant interval increase in ascites when compared to prior exam which may account for patient's abdominal distension.  Hypodense air-fluid collection along the body of the pancreas which is slightly smaller when compared to prior CT.  Findings may relate to pancreatic necrosis with pancreatic pseudocysts with infected pseudocyst    Skin cancer     skin cancer    Sleep apnea 8/26/2015    Status post bariatric surgery 1/11/2016    Type 2 diabetes mellitus, with long-term current use of insulin 5/8/2015       Past Surgical History:   Procedure Laterality Date    ANGIOPLASTY      total x5 stents    COLONOSCOPY N/A 10/6/2015    Performed by Shekhar Richards MD at [facility] ENDO (2ND FLR)    CORONARY ARTERY BYPASS GRAFT  2017    x3    CYST REMOVAL      GASTRECTOMY      GASTRECTOMY-SLEEVE-LAPAROSCOPIC - 64665 N/A 12/22/2015    Performed by Micheal Villavicencio Jr., MD at [facility] OR 2ND FLR    KNEE ARTHROSCOPY      perianal surgery      perianal cyst removed       Family history of  liver disease: No    Review of patient's allergies indicates:  No Known Allergies    Tobacco Use    Smoking status: Former Smoker     Packs/day: 2.00     Years: 30.00     Pack years: 60.00     Types: Cigarettes     Last attempt to quit: 2005     Years since quittin.1    Smokeless tobacco: Never Used   Substance and Sexual Activity    Alcohol use: No     Comment: started ~, reports 1 shot daily, max 3 shots daily, vague about alcohol consumption. Last drink 2018    Drug use: No    Sexual activity: Not on file       Medications Prior to Admission   Medication Sig Dispense Refill Last Dose    aspirin 325 MG tablet Take 81 mg by mouth once daily.   3/12/2019    atorvastatin (LIPITOR) 40 MG tablet Take 1 tablet (40 mg total) by mouth once daily. 90 tablet 3 3/12/2019    cyanocobalamin, vitamin B-12, 500 mcg Subl Place 1 tablet under the tongue every Monday.    3/12/2019    furosemide (LASIX) 40 MG tablet Take 1 tablet (40 mg total) by mouth once daily. 90 tablet 3 3/12/2019    insulin aspart U-100 (NOVOLOG) 100 unit/mL injection Inject 4 Units into the skin 3 (three) times daily before meals.   3/12/2019    insulin detemir (LEVEMIR FLEXPEN SUBQ) Inject 12 Units into the skin once daily.    3/12/2019    lipase-protease-amylase (CREON) 36,000-114,000- 180,000 unit CpDR Take 1 capsule by mouth 3 (three) times daily. 270 capsule 3 3/12/2019    metoprolol succinate (TOPROL-XL) 100 MG 24 hr tablet Take 1 tablet (100 mg total) by mouth once daily. 90 tablet 3 3/12/2019    omeprazole (PRILOSEC) 40 MG capsule Take 40 mg by mouth once daily.  8 3/12/2019    ONETOUCH ULTRA TEST Strp 4 (four) times daily. Use to test blood sugar four times a day.  3 3/12/2019    ramipril (ALTACE) 2.5 MG capsule Take 1 capsule (2.5 mg total) by mouth once daily.   3/12/2019    spironolactone (ALDACTONE) 100 MG tablet Take 1 tablet (100 mg total) by mouth once daily. 90 tablet 3 3/12/2019    tamsulosin (FLOMAX) 0.4 mg  Cap Take 1 capsule by mouth once daily. Pt has not started taking as of 2/27/19.  0 3/12/2019    vitamin D (VITAMIN D3) 1000 units Tab Take 1 tablet (1,000 Units total) by mouth once daily.   3/12/2019       Objective:     Vital Signs (Most Recent):  Temp: 97.7 °F (36.5 °C) (03/14/19 1539)  Pulse: (!) 112 (03/14/19 1539)  Resp: 18 (03/14/19 1539)  BP: 136/67 (03/14/19 1539)  SpO2: 99 % (03/14/19 1539) Vital Signs (24h Range):  Temp:  [97.5 °F (36.4 °C)-99.9 °F (37.7 °C)] 97.7 °F (36.5 °C)  Pulse:  [100-130] 112  Resp:  [18] 18  SpO2:  [97 %-100 %] 99 %  BP: (110-136)/(56-67) 136/67     Weight: 134.6 kg (296 lb 13.6 oz) (03/13/19 1404)  Body mass index is 46.49 kg/m².    Physical Exam   Constitutional: He is oriented to person, place, and time. No distress.   HENT:   Head: Normocephalic and atraumatic.   Eyes: No scleral icterus.   Cardiovascular:   Tachycardia   Pulmonary/Chest: Effort normal and breath sounds normal.   Abdominal: Soft. Bowel sounds are normal. He exhibits distension. There is no tenderness.   Musculoskeletal: He exhibits edema.   Neurological: He is alert and oriented to person, place, and time.   Skin: He is not diaphoretic.   Nursing note and vitals reviewed.      MELD-Na score: 8 at 3/14/2019  4:46 AM  MELD score: 8 at 3/14/2019  4:46 AM  Calculated from:  Serum Creatinine: 1.1 mg/dL at 3/14/2019  4:46 AM  Serum Sodium: 133 mmol/L at 3/14/2019  4:46 AM  Total Bilirubin: 0.5 mg/dL (Rounded to 1 mg/dL) at 3/14/2019  4:46 AM  INR(ratio): 1.1 at 3/14/2019  4:46 AM  Age: 63 years    Significant Labs:  CBC:   Recent Labs   Lab 03/14/19  0446   WBC 4.80   RBC 2.80*   HGB 8.3*   HCT 25.4*        CMP:   Recent Labs   Lab 03/14/19 0446   *   CALCIUM 7.5*   ALBUMIN 1.8*   PROT 4.4*   *   K 4.1   CO2 23      BUN 29*   CREATININE 1.1   ALKPHOS 95   ALT 11   AST 13   BILITOT 0.5     Coagulation:   Recent Labs   Lab 03/14/19 0446   INR 1.1       Significant Imaging:  X-Ray:  Reviewed  US: Reviewed  CT: Reviewed

## 2019-03-14 NOTE — H&P
Inpatient Radiology Pre-procedure Note    History of Present Illness:  Jian Arrieta is a 63 y.o. male who presents for paracentesis and transjugular liver biopsy. Will insert PICC while patient is down for other procedures.  Admission H&P reviewed.  Past Medical History:   Diagnosis Date    Alcohol abuse     Anasarca 1/28/2019    Arthropathy associated with neurological disorder 9/2/2015    Atherosclerosis     Charcot foot due to diabetes mellitus     Chronic combined systolic and diastolic heart failure 01/29/2019 1-28-19 Left VentricleModerate decreased ejection fraction at 30%. Normal left ventricular cavity size. Normal wall thickness observed. Grade I (mild) left ventricular diastolic dysfunction consistent with impaired relaxation. Normal left atrial pressure. Moderate, global hypokinesis(see wall scoring diagram). Right VentricleNormal cavity size, wall thickness and ejection fraction. Wall motion n    Chronic pancreatitis 1/28/2019    Cirrhosis of liver with ascites 1/28/2019    Colon polyps     approx 5 yrs ago    Coronary artery disease due to calcified coronary lesion 05/08/2015    5 stents on ASA      Diabetic polyneuropathy associated with type 2 diabetes mellitus 9/2/2015    Diverticulosis 1/28/2019    DM type 2 with diabetic peripheral neuropathy 2/4/2019    Essential hypertension 1/28/2019    Former smoker 8/26/2015    Healed ulcer of left foot on examination 6/20/2017    Hydrocele     approx 1.5 yrs ago    Hypoalbuminemia 2/4/2019    Lymphedema of both lower extremities 1/29/2019    Mixed hyperlipidemia 5/8/2015    Morbid obesity with BMI of 50.0-59.9, adult 5/8/2015    Onychomycosis of multiple toenails with type 2 diabetes mellitus and peripheral neuropathy 6/20/2017    Other cirrhosis of liver     1-28-19 Liver has a cirrhotic morphology with no focal lesions.  Significant interval increase in ascites when compared to prior exam which may account for patient's  abdominal distension.  Hypodense air-fluid collection along the body of the pancreas which is slightly smaller when compared to prior CT.  Findings may relate to pancreatic necrosis with pancreatic pseudocysts with infected pseudocyst    Perianal cyst     approx 2 yrs ago    Pseudocyst of pancreas 1/28/2019 1-28-19 Liver has a cirrhotic morphology with no focal lesions.  Significant interval increase in ascites when compared to prior exam which may account for patient's abdominal distension.  Hypodense air-fluid collection along the body of the pancreas which is slightly smaller when compared to prior CT.  Findings may relate to pancreatic necrosis with pancreatic pseudocysts with infected pseudocyst    Skin cancer     skin cancer    Sleep apnea 8/26/2015    Status post bariatric surgery 1/11/2016    Type 2 diabetes mellitus, with long-term current use of insulin 5/8/2015     Past Surgical History:   Procedure Laterality Date    ANGIOPLASTY      total x5 stents    COLONOSCOPY N/A 10/6/2015    Performed by [facility]khar Richards MD at [facility] ENDO (2ND FLR)    CORONARY ARTERY BYPASS GRAFT  2017    x3    CYST REMOVAL      GASTRECTOMY      GASTRECTOMY-SLEEVE-LAPAROSCOPIC - 34895 N/A 12/22/2015    Performed by Micheal Villavicencio Jr., MD at [facility] OR 2ND FLR    KNEE ARTHROSCOPY      perianal surgery      perianal cyst removed       Review of Systems:   As documented in primary team H&P    Home Meds:   Prior to Admission medications    Medication Sig Start Date End Date Taking? Authorizing Provider   aspirin 325 MG tablet Take 81 mg by mouth once daily.   Yes Historical Provider, MD   atorvastatin (LIPITOR) 40 MG tablet Take 1 tablet (40 mg total) by mouth once daily. 2/4/19 2/4/20 Yes Alfonso Mahmood MD   cyanocobalamin, vitamin B-12, 500 mcg Subl Place 1 tablet under the tongue every Monday.    Yes Historical Provider, MD   furosemide (LASIX) 40 MG tablet Take 1 tablet (40 mg total) by mouth once daily. 2/4/19 2/4/20 Yes  Alfonso Mahmood MD   insulin aspart U-100 (NOVOLOG) 100 unit/mL injection Inject 4 Units into the skin 3 (three) times daily before meals.   Yes Historical Provider, MD   insulin detemir (LEVEMIR FLEXPEN SUBQ) Inject 12 Units into the skin once daily.    Yes Historical Provider, MD   lipase-protease-amylase (CREON) 36,000-114,000- 180,000 unit CpDR Take 1 capsule by mouth 3 (three) times daily. 2/4/19  Yes Alfonso Mahmood MD   metoprolol succinate (TOPROL-XL) 100 MG 24 hr tablet Take 1 tablet (100 mg total) by mouth once daily. 2/4/19 2/4/20 Yes Alfonso Mahmood MD   omeprazole (PRILOSEC) 40 MG capsule Take 40 mg by mouth once daily. 2/18/19  Yes Historical Provider, MD   ONETOUCH ULTRA TEST Strp 4 (four) times daily. Use to test blood sugar four times a day. 10/15/15  Yes Historical Provider, MD   ramipril (ALTACE) 2.5 MG capsule Take 1 capsule (2.5 mg total) by mouth once daily. 2/27/19  Yes Jaime Carney III, MD   spironolactone (ALDACTONE) 100 MG tablet Take 1 tablet (100 mg total) by mouth once daily. 2/4/19  Yes Alfonso Mahmood MD   tamsulosin (FLOMAX) 0.4 mg Cap Take 1 capsule by mouth once daily. Pt has not started taking as of 2/27/19. 2/5/19  Yes Historical Provider, MD   vitamin D (VITAMIN D3) 1000 units Tab Take 1 tablet (1,000 Units total) by mouth once daily. 1/31/19  Yes Jian Lambert MD     Scheduled Meds:    albumin human 25%  25 g Intravenous Q6H    atorvastatin  40 mg Oral Daily    furosemide  40 mg Oral Daily    lipase-protease-amylase 12,000-38,000-60,000 units  3 capsule Oral TID WM    spironolactone  100 mg Oral Daily     Continuous Infusions:   PRN Meds:acetaminophen, dextrose 50%, glucagon (human recombinant), insulin aspart U-100, ondansetron, oxyCODONE, sodium chloride 0.9%  Anticoagulants/Antiplatelets: no anticoagulation    Allergies: Review of patient's allergies indicates:  No Known Allergies  Sedation Hx: have not been any systemic reactions    Labs:  Recent Labs   Lab 03/14/19  8382    INR 1.1       Recent Labs   Lab 03/14/19 0446   WBC 4.80   HGB 8.3*   HCT 25.4*   MCV 91         Recent Labs   Lab 03/14/19 0446   *   *   K 4.1      CO2 23   BUN 29*   CREATININE 1.1   CALCIUM 7.5*   ALT 11   AST 13   ALBUMIN 1.8*   BILITOT 0.5         Vitals:  Temp: 97.7 °F (36.5 °C) (03/14/19 1539)  Pulse: (!) 112 (03/14/19 1539)  Resp: 18 (03/14/19 1539)  BP: 136/67 (03/14/19 1539)  SpO2: 99 % (03/14/19 1539)     Physical Exam:  ASA: II  Mallampati: III    General: no acute distress  Mental Status: alert and oriented to person, place and time  HEENT: normocephalic, atraumatic  Chest: unlabored breathing  Heart: regular heart rate  Abdomen: nondistended  Extremity: moves all extremities    Plan: Paracentesis and transjugular liver biopsy. PICC insertion   Sedation Plan: Jay Lopez M.D.   PGY-2  Radiology

## 2019-03-14 NOTE — HPI
"63 year old male with a history of ?MARIE/HUGH cirrhosis, CHF (EF of 30%), CAD s/p CABG and PIC, HTN, HLD, DM, BERHANE, and alcohol use c/b by pancreatitis/pseudocyst. Hepatology is being consulted due to ongoing ascites.    Patient admitted on 3/13/19 due to shortness of breath and lower extremity edema. He reports taking his diuretics most of the time (not all the time-because if he waited until the afternoon to take his pills he would omit the diuretics in order to not wake up at night to urinate) and "trying" to cut back on salt/fluid intake. On admission Na was 130 and he had an ELIEZER (Cr 1.6). He also had a CTA which showed no PE, CABG, sleeve gastrectomy, mild ground-glass attenuation of the lungs, large volume abdominal ascites, and persistent air and fluid collection along the body of the pancreas suggestive of pancreatic necrosis or pseudocyst. Currently feels OK but thinks the ascites is accumulating quickly and he remains short of breath with exertion. His Na and Cr have improve with LFT's/platelets/INR. Wife is concerned with the fact that he has been slowly declining since September and he has not been able to get a liver biopsy.    "

## 2019-03-14 NOTE — ASSESSMENT & PLAN NOTE
63 year old male with a history of ?MARIE/HUGH cirrhosis, CHF (EF of 30%), CAD s/p CABG and PIC, HTN, HLD, DM, BERHANE, and alcohol use c/b by pancreatitis/pseudocyst. Hepatology is being consulted due to ongoing ascites. Patient seen in hepatology clinic with initial serology negative and plan to proceed with transjugular liver biopsy with hepatic venous pressure measurement to establish/confirm the diagnosis of cirrhosis. At this point would restart diuretics as well as obtain a repeat paracentesis (with studies) and TJ biopsy. Biopsy would be the most appropriate because although he has ascites his PLTS, INR, and spleen are normal which doesn't support the diagnosis of cirrhosis/portal hypertension.    Recommendations:  --Daily CMP, CBC, and INR  --Strict I/O  --Renally dose all medications and avoid nephrotoxins  --Restart diuretics at home does  --Obtain repeat paracentesis with cell count/diff, total protein, albumin, gram stain, culture, and amylase  --Obtain IR transjugular liver biopsy with portal pressures  --Additional recommendations based on paracentesis studies and biopsy

## 2019-03-14 NOTE — CONSULTS
"[facility]  Hepatology  Consult Note    Patient Name: Jian Arrieta  MRN: [id number]  Admission Date: 3/12/2019  Hospital Length of Stay: 0 days  Attending Provider: SELENA Castillo MD   Primary Care Physician: Samir Mahmood MD  Principal Problem:Ascites    Inpatient consult to Hepatology  Consult performed by: Freddy Mcclure MD  Consult ordered by: Yessica Dc NP        Subjective:     HPI:  63 year old male with a history of ?MARIE/HUGH cirrhosis, CHF (EF of 30%), CAD s/p CABG and PIC, HTN, HLD, DM, BERHANE, and alcohol use c/b by pancreatitis/pseudocyst. Hepatology is being consulted due to ongoing ascites.    Patient admitted on 3/13/19 due to shortness of breath and lower extremity edema. He reports taking his diuretics most of the time (not all the time-because if he waited until the afternoon to take his pills he would omit the diuretics in order to not wake up at night to urinate) and "trying" to cut back on salt/fluid intake. On admission Na was 130 and he had an ELIEZER (Cr 1.6). He also had a CTA which showed no PE, CABG, sleeve gastrectomy, mild ground-glass attenuation of the lungs, large volume abdominal ascites, and persistent air and fluid collection along the body of the pancreas suggestive of pancreatic necrosis or pseudocyst. Currently feels OK but thinks the ascites is accumulating quickly and he remains short of breath with exertion. His Na and Cr have improve with LFT's/platelets/INR. Wife is concerned with the fact that he has been slowly declining since September and he has not been able to get a liver biopsy.      Review of Systems   Constitutional: Positive for fatigue. Negative for activity change, appetite change, chills, diaphoresis, fever and unexpected weight change.   HENT: Negative for trouble swallowing and voice change.    Eyes: Negative for photophobia, pain, discharge, redness, itching and visual disturbance.   Respiratory: Negative for cough and shortness of " breath.    Cardiovascular: Positive for leg swelling. Negative for palpitations.   Gastrointestinal: Positive for abdominal distention. Negative for abdominal pain, anal bleeding, blood in stool, constipation, diarrhea, nausea, rectal pain and vomiting.   Genitourinary: Negative for dysuria, frequency, hematuria and urgency.   Musculoskeletal: Negative for back pain and neck pain.   Neurological: Negative for dizziness, syncope, weakness and light-headedness.       Past Medical History:   Diagnosis Date    Alcohol abuse     Anasarca 1/28/2019    Arthropathy associated with neurological disorder 9/2/2015    Atherosclerosis     Charcot foot due to diabetes mellitus     Chronic combined systolic and diastolic heart failure 01/29/2019 1-28-19 Left VentricleModerate decreased ejection fraction at 30%. Normal left ventricular cavity size. Normal wall thickness observed. Grade I (mild) left ventricular diastolic dysfunction consistent with impaired relaxation. Normal left atrial pressure. Moderate, global hypokinesis(see wall scoring diagram). Right VentricleNormal cavity size, wall thickness and ejection fraction. Wall motion n    Chronic pancreatitis 1/28/2019    Cirrhosis of liver with ascites 1/28/2019    Colon polyps     approx 5 yrs ago    Coronary artery disease due to calcified coronary lesion 05/08/2015    5 stents on ASA      Diabetic polyneuropathy associated with type 2 diabetes mellitus 9/2/2015    Diverticulosis 1/28/2019    DM type 2 with diabetic peripheral neuropathy 2/4/2019    Essential hypertension 1/28/2019    Former smoker 8/26/2015    Healed ulcer of left foot on examination 6/20/2017    Hydrocele     approx 1.5 yrs ago    Hypoalbuminemia 2/4/2019    Lymphedema of both lower extremities 1/29/2019    Mixed hyperlipidemia 5/8/2015    Morbid obesity with BMI of 50.0-59.9, adult 5/8/2015    Onychomycosis of multiple toenails with type 2 diabetes mellitus and peripheral neuropathy  2017    Other cirrhosis of liver     19 Liver has a cirrhotic morphology with no focal lesions.  Significant interval increase in ascites when compared to prior exam which may account for patient's abdominal distension.  Hypodense air-fluid collection along the body of the pancreas which is slightly smaller when compared to prior CT.  Findings may relate to pancreatic necrosis with pancreatic pseudocysts with infected pseudocyst    Perianal cyst     approx 2 yrs ago    Pseudocyst of pancreas 2019 Liver has a cirrhotic morphology with no focal lesions.  Significant interval increase in ascites when compared to prior exam which may account for patient's abdominal distension.  Hypodense air-fluid collection along the body of the pancreas which is slightly smaller when compared to prior CT.  Findings may relate to pancreatic necrosis with pancreatic pseudocysts with infected pseudocyst    Skin cancer     skin cancer    Sleep apnea 2015    Status post bariatric surgery 2016    Type 2 diabetes mellitus, with long-term current use of insulin 2015       Past Surgical History:   Procedure Laterality Date    ANGIOPLASTY      total x5 stents    COLONOSCOPY N/A 10/6/2015    Performed by [facility]khar Richards [facility] at [facility] ENDO (2ND FLR)    CORONARY ARTERY BYPASS GRAFT  2017    x3    CYST REMOVAL      GASTRECTOMY      GASTRECTOMY-SLEEVE-LAPAROSCOPIC - 30744 N/A 2015    Performed by Micheal Villavicencio Jr., MD at [facility] OR 2ND FLR    KNEE ARTHROSCOPY      perianal surgery      perianal cyst removed       Family history of liver disease: No    Review of patient's allergies indicates:  No Known Allergies    Tobacco Use    Smoking status: Former Smoker     Packs/day: 2.00     Years: 30.00     Pack years: 60.00     Types: Cigarettes     Last attempt to quit: 2005     Years since quittin.1    Smokeless tobacco: Never Used   Substance and Sexual Activity    Alcohol use: No      Comment: started ~2014, reports 1 shot daily, max 3 shots daily, vague about alcohol consumption. Last drink 9/2018    Drug use: No    Sexual activity: Not on file       Medications Prior to Admission   Medication Sig Dispense Refill Last Dose    aspirin 325 MG tablet Take 81 mg by mouth once daily.   3/12/2019    atorvastatin (LIPITOR) 40 MG tablet Take 1 tablet (40 mg total) by mouth once daily. 90 tablet 3 3/12/2019    cyanocobalamin, vitamin B-12, 500 mcg Subl Place 1 tablet under the tongue every Monday.    3/12/2019    furosemide (LASIX) 40 MG tablet Take 1 tablet (40 mg total) by mouth once daily. 90 tablet 3 3/12/2019    insulin aspart U-100 (NOVOLOG) 100 unit/mL injection Inject 4 Units into the skin 3 (three) times daily before meals.   3/12/2019    insulin detemir (LEVEMIR FLEXPEN SUBQ) Inject 12 Units into the skin once daily.    3/12/2019    lipase-protease-amylase (CREON) 36,000-114,000- 180,000 unit CpDR Take 1 capsule by mouth 3 (three) times daily. 270 capsule 3 3/12/2019    metoprolol succinate (TOPROL-XL) 100 MG 24 hr tablet Take 1 tablet (100 mg total) by mouth once daily. 90 tablet 3 3/12/2019    omeprazole (PRILOSEC) 40 MG capsule Take 40 mg by mouth once daily.  8 3/12/2019    ONETOUCH ULTRA TEST Strp 4 (four) times daily. Use to test blood sugar four times a day.  3 3/12/2019    ramipril (ALTACE) 2.5 MG capsule Take 1 capsule (2.5 mg total) by mouth once daily.   3/12/2019    spironolactone (ALDACTONE) 100 MG tablet Take 1 tablet (100 mg total) by mouth once daily. 90 tablet 3 3/12/2019    tamsulosin (FLOMAX) 0.4 mg Cap Take 1 capsule by mouth once daily. Pt has not started taking as of 2/27/19.  0 3/12/2019    vitamin D (VITAMIN D3) 1000 units Tab Take 1 tablet (1,000 Units total) by mouth once daily.   3/12/2019       Objective:     Vital Signs (Most Recent):  Temp: 97.7 °F (36.5 °C) (03/14/19 1539)  Pulse: (!) 112 (03/14/19 1539)  Resp: 18 (03/14/19 1539)  BP: 136/67  (03/14/19 1539)  SpO2: 99 % (03/14/19 1539) Vital Signs (24h Range):  Temp:  [97.5 °F (36.4 °C)-99.9 °F (37.7 °C)] 97.7 °F (36.5 °C)  Pulse:  [100-130] 112  Resp:  [18] 18  SpO2:  [97 %-100 %] 99 %  BP: (110-136)/(56-67) 136/67     Weight: 134.6 kg (296 lb 13.6 oz) (03/13/19 1404)  Body mass index is 46.49 kg/m².    Physical Exam   Constitutional: He is oriented to person, place, and time. No distress.   HENT:   Head: Normocephalic and atraumatic.   Eyes: No scleral icterus.   Cardiovascular:   Tachycardia   Pulmonary/Chest: Effort normal and breath sounds normal.   Abdominal: Soft. Bowel sounds are normal. He exhibits distension. There is no tenderness.   Musculoskeletal: He exhibits edema.   Neurological: He is alert and oriented to person, place, and time.   Skin: He is not diaphoretic.   Nursing note and vitals reviewed.      MELD-Na score: 8 at 3/14/2019  4:46 AM  MELD score: 8 at 3/14/2019  4:46 AM  Calculated from:  Serum Creatinine: 1.1 mg/dL at 3/14/2019  4:46 AM  Serum Sodium: 133 mmol/L at 3/14/2019  4:46 AM  Total Bilirubin: 0.5 mg/dL (Rounded to 1 mg/dL) at 3/14/2019  4:46 AM  INR(ratio): 1.1 at 3/14/2019  4:46 AM  Age: 63 years    Significant Labs:  CBC:   Recent Labs   Lab 03/14/19 0446   WBC 4.80   RBC 2.80*   HGB 8.3*   HCT 25.4*        CMP:   Recent Labs   Lab 03/14/19 0446   *   CALCIUM 7.5*   ALBUMIN 1.8*   PROT 4.4*   *   K 4.1   CO2 23      BUN 29*   CREATININE 1.1   ALKPHOS 95   ALT 11   AST 13   BILITOT 0.5     Coagulation:   Recent Labs   Lab 03/14/19 0446   INR 1.1       Significant Imaging:  X-Ray: Reviewed  US: Reviewed  CT: Reviewed    Assessment/Plan:     * Ascites    63 year old male with a history of ?MARIE/HUGH cirrhosis, CHF (EF of 30%), CAD s/p CABG and PIC, HTN, HLD, DM, BERHANE, and alcohol use c/b by pancreatitis/pseudocyst. Hepatology is being consulted due to ongoing ascites. Patient seen in hepatology clinic with initial serology negative and plan to  proceed with transjugular liver biopsy with hepatic venous pressure measurement to establish/confirm the diagnosis of cirrhosis. At this point would restart diuretics as well as obtain a repeat paracentesis (with studies) and TJ biopsy. Biopsy would be the most appropriate because although he has ascites his PLTS, INR, and spleen are normal which doesn't support the diagnosis of cirrhosis/portal hypertension.    Recommendations:  --Daily CMP, CBC, and INR  --Strict I/O  --Renally dose all medications and avoid nephrotoxins  --Restart diuretics at home does  --Obtain repeat paracentesis with cell count/diff, total protein, albumin, gram stain, culture, and amylase  --Obtain IR transjugular liver biopsy with portal pressures  --Additional recommendations based on paracentesis studies and biopsy         Thank you for your consult. I will follow-up with patient. Please contact us if you have any additional questions.    Freddy Mcclure M.D.  Gastroenterology Fellow, PGY-V  Pager: [phone]  [facility]

## 2019-03-14 NOTE — PT/OT/SLP EVAL
Physical Therapy Evaluation    Patient Name:  Jian Arrieta   MRN:  [id number]    Recommendations:     Discharge Recommendations:  home health PT   Discharge Equipment Recommendations: none   Barriers to discharge: None    Assessment:     Jian Arrieta is a 63 y.o. male admitted with a medical diagnosis of Ascites.  He presents with the following impairments/functional limitations:  weakness, impaired endurance, gait instability, impaired functional mobilty, decreased lower extremity function, decreased upper extremity function, impaired self care skills, edema .  At today's session, patient limited by fatigue. Pt able to gait x ~54 feet with SBA with no AD. Pt states that he lives in a [facility] apartment adjacent to his home but does have 3 EULA his main house. Patient is close to their functional baseline at this time,  but would still benefit from skilled PT services to prevent deconditioning, reduce length of stay and prevent falls while in the hospital.     Discharge recommendation home with home health PT in order to maximize mobility, decrease caregiver burden and increase  functional independence while in the home setting.        Rehab Prognosis: Good; patient would benefit from acute skilled PT services to address these deficits and reach maximum level of function.    Recent Surgery: * No surgery found *      Plan:     During this hospitalization, patient to be seen 3 x/week to address the identified rehab impairments via gait training, therapeutic activities, therapeutic exercises and progress toward the following goals:    · Plan of Care Expires:  04/14/19    Subjective     Chief Complaint:pt complains that he only gets weak when he becomes swollen.   Patient/Family Comments/goals: to get more fluid off of his abdomen and go home  Pain/Comfort:  · Pain Rating 1: 0/10  · Pain Rating Post-Intervention 1: 0/10    Patients cultural, spiritual, Jewish conflicts given the current situation: no    Living  Environment:  Pt lives in a [facility] with 3/5 EULA with HR but states that he spends most of his time/sleep in an adjacent apartment that is handicapped accessible.  Prior to admission, patients level of function was mod ( I) using adaptive equipment, able to ambulate with no AD household distances but limited community ambulator.  Equipment used at home: cane, straight, walker, rolling, shower chair, raised toilet(lift chairs).  DME owned (not currently used): none.  Upon discharge, patient will have assistance from his wife.    Objective:     Communicated with RN  prior to session.  Patient found sitting up in chair with  (no active lines)  upon PT entry to room.    General Precautions: Standard, fall   Orthopedic Precautions:N/A   Braces: N/A     Exams:  · Cognitive Exam:  Patient is oriented to Person, Place, Time and Situation  · Fine Motor Coordination: -       Intact  · Gross Motor Coordination:  WFL  · Postural Exam:  Patient presented with the following abnormalities: -       Rounded shoulders  · -       Forward head  · Sensation: -       Intact  · Skin Integrity/Edema:  -       Edema: Severe B LE/feet. pt also states that he feels like he has more fluid in his abdomen ( difficult to determine visually 2/2 adipose tissue )  · RLE ROM: WFL   · RLE Strength: WFL  · LLE ROM: WFL  · LLE Strength: WFL    Functional Mobility:  · Bed Mobility:     · Transfers:  Sit to Stand:  supervision with no AD  · Gait: x 45 feet with no AD, supervision/SBA; increased CLEO and decreased miracle    Balance:   Static Sitting:   NORMAL: No deviations seen in posture held statically  Dynamic Sitting:   NORMAL: No deviations seen in posture held dynamically  Static Standing:   GOOD: Takes MODERATE challenges from all directions  Dynamic Standing:   GOOD-: Needs SUPERVISION only during gait and able to self right with moderate LOB inconsistently      Therapeutic Activities and Exercises:  Pt edu on importance of cont with HEP that HH PT  had initiated     Patient education  · Patient educated on the role of PT and POC  · Patient educated on importance  activity while in the hosptial per tolerance for improved endurance and to limit deconditioning   · Patient educated on safe transfers with nursing as appropriate  · Patient educated on proper transfer mechanics and safety  · All of patients questions were answered within the scope of PT        AM-PAC 6 CLICK MOBILITY  Total Score:21     Patient left up in chair with all lines intact, call button in reach and RN notified.    GOALS:   Multidisciplinary Problems     Physical Therapy Goals        Problem: Physical Therapy Goal    Goal Priority Disciplines Outcome Goal Variances Interventions   Physical Therapy Goal     PT, PT/OT Ongoing (interventions implemented as appropriate)     Description:  Goals to be met by: 3/24/19     Patient will increase functional independence with mobility by performin. Supine to sit with Modified [place]  2. Gait  x 100 feet with Supervision using Rolling Walker or LRD.   3. Ascend/descend 3 stair with right Handrails Contact Guard Assistance using Rolling Walker or LRD.   4. Stand for 10 minutes with Contact Guard Assistance using Rolling Walker or LRD to prepare for standing ADLs.                         History:     Past Medical History:   Diagnosis Date    Alcohol abuse     Anasarca 2019    Arthropathy associated with neurological disorder 2015    Atherosclerosis     Charcot foot due to diabetes mellitus     Chronic combined systolic and diastolic heart failure 2019 Left VentricleModerate decreased ejection fraction at 30%. Normal left ventricular cavity size. Normal wall thickness observed. Grade I (mild) left ventricular diastolic dysfunction consistent with impaired relaxation. Normal left atrial pressure. Moderate, global hypokinesis(see wall scoring diagram). Right VentricleNormal cavity size, wall thickness and ejection  fraction. Wall motion n    Chronic pancreatitis 1/28/2019    Cirrhosis of liver with ascites 1/28/2019    Colon polyps     approx 5 yrs ago    Coronary artery disease due to calcified coronary lesion 05/08/2015    5 stents on ASA      Diabetic polyneuropathy associated with type 2 diabetes mellitus 9/2/2015    Diverticulosis 1/28/2019    DM type 2 with diabetic peripheral neuropathy 2/4/2019    Essential hypertension 1/28/2019    Former smoker 8/26/2015    Healed ulcer of left foot on examination 6/20/2017    Hydrocele     approx 1.5 yrs ago    Hypoalbuminemia 2/4/2019    Lymphedema of both lower extremities 1/29/2019    Mixed hyperlipidemia 5/8/2015    Morbid obesity with BMI of 50.0-59.9, adult 5/8/2015    Onychomycosis of multiple toenails with type 2 diabetes mellitus and peripheral neuropathy 6/20/2017    Other cirrhosis of liver     1-28-19 Liver has a cirrhotic morphology with no focal lesions.  Significant interval increase in ascites when compared to prior exam which may account for patient's abdominal distension.  Hypodense air-fluid collection along the body of the pancreas which is slightly smaller when compared to prior CT.  Findings may relate to pancreatic necrosis with pancreatic pseudocysts with infected pseudocyst    Perianal cyst     approx 2 yrs ago    Pseudocyst of pancreas 1/28/2019 1-28-19 Liver has a cirrhotic morphology with no focal lesions.  Significant interval increase in ascites when compared to prior exam which may account for patient's abdominal distension.  Hypodense air-fluid collection along the body of the pancreas which is slightly smaller when compared to prior CT.  Findings may relate to pancreatic necrosis with pancreatic pseudocysts with infected pseudocyst    Skin cancer     skin cancer    Sleep apnea 8/26/2015    Status post bariatric surgery 1/11/2016    Type 2 diabetes mellitus, with long-term current use of insulin 5/8/2015       Past Surgical  History:   Procedure Laterality Date    ANGIOPLASTY      total x5 stents    COLONOSCOPY N/A 10/6/2015    Performed by Shekhar Richards MD at [facility] ENDO (2ND FLR)    CORONARY ARTERY BYPASS GRAFT  2017    x3    CYST REMOVAL      GASTRECTOMY      GASTRECTOMY-SLEEVE-LAPAROSCOPIC - 42304 N/A 12/22/2015    Performed by Micheal Villavicencio Jr., MD at [facility] OR 2ND FLR    KNEE ARTHROSCOPY      perianal surgery      perianal cyst removed       Time Tracking:     PT Received On: 03/14/19  PT Start Time: 1154     PT Stop Time: 1215  PT Total Time (min): 21 min     Billable Minutes: Evaluation 10 min and Therapeutic Activity 8 min      Tariq Judd, PT  03/14/2019

## 2019-03-14 NOTE — PLAN OF CARE
Problem: Physical Therapy Goal  Goal: Physical Therapy Goal  Goals to be met by: 3/24/19     Patient will increase functional independence with mobility by performin. Supine to sit with Modified [place]  2. Gait  x 100 feet with Supervision using Rolling Walker or LRD.   3. Ascend/descend 3 stair with right Handrails Contact Guard Assistance using Rolling Walker or LRD.   4. Stand for 10 minutes with Contact Guard Assistance using Rolling Walker or LRD to prepare for standing ADLs.       Outcome: Ongoing (interventions implemented as appropriate)  Patient evaluated today. All goals established are appropriate for patient progression at this time.     Tariq Judd PT, DPT  3/14/2019  Pager: [phone]

## 2019-03-14 NOTE — SUBJECTIVE & OBJECTIVE
Interval History: Patient states he feels he reaccumulated some fluid overnight but is still much improved from previous. Hepatology seeing patient. Patient updated on plan and agrees. Patient noted to be hypotensive and tachycardic. Will start albumin.     Review of Systems   Constitutional: Positive for activity change. Negative for chills and fever.   Eyes: Negative for photophobia and visual disturbance.   Respiratory: Positive for shortness of breath (improved). Negative for cough, chest tightness and wheezing.    Cardiovascular: Positive for leg swelling. Negative for chest pain (with exertion) and palpitations.   Gastrointestinal: Positive for abdominal distention (much improved). Negative for abdominal pain, constipation, diarrhea, nausea and vomiting.   Endocrine: Positive for cold intolerance.   Genitourinary: Negative for dysuria, hematuria and urgency.   Musculoskeletal: Negative for back pain and neck pain.   Skin: Negative for rash and wound.   Neurological: Positive for weakness. Negative for dizziness, facial asymmetry, speech difficulty and light-headedness.   Psychiatric/Behavioral: Negative for agitation and confusion. The patient is not nervous/anxious.      Objective:     Vital Signs (Most Recent):  Temp: 97.7 °F (36.5 °C) (03/14/19 1539)  Pulse: (!) 112 (03/14/19 1539)  Resp: 18 (03/14/19 1539)  BP: 136/67 (03/14/19 1539)  SpO2: 99 % (03/14/19 1539) Vital Signs (24h Range):  Temp:  [97.5 °F (36.4 °C)-99.9 °F (37.7 °C)] 97.7 °F (36.5 °C)  Pulse:  [100-130] 112  Resp:  [18] 18  SpO2:  [97 %-100 %] 99 %  BP: (110-136)/(56-67) 136/67     Weight: 134.6 kg (296 lb 13.6 oz)  Body mass index is 46.49 kg/m².    Intake/Output Summary (Last 24 hours) at 3/14/2019 1634  Last data filed at 3/14/2019 1500  Gross per 24 hour   Intake 840 ml   Output 1325 ml   Net -485 ml      Physical Exam   Constitutional: He is oriented to person, place, and time. He appears well-developed and well-nourished. No distress.    Cardiovascular: Normal rate and regular rhythm.   Murmur heard.  Pulmonary/Chest: Effort normal. He has no wheezes. He has no rales.   Abdominal: Soft. Bowel sounds are normal. He exhibits distension (improved), fluid wave and ascites. He exhibits no mass. There is no tenderness. There is no rebound and no guarding.   Musculoskeletal: Normal range of motion. He exhibits edema (BLE). He exhibits no tenderness.   Neurological: He is alert and oriented to person, place, and time. No cranial nerve deficit or sensory deficit.   Skin: Skin is warm and dry. Capillary refill takes less than 2 seconds. He is not diaphoretic.   Scattered ecchymoses   Psychiatric: He has a normal mood and affect. His behavior is normal. Thought content normal.       Significant Labs:   Bilirubin:   Recent Labs   Lab 02/23/19  1918 03/12/19  2109 03/13/19  0356 03/14/19  0446 03/15/19  0448   BILITOT 0.4 0.4 0.4 0.5 0.5     CBC:   Recent Labs   Lab 03/14/19  0446 03/15/19  0448   WBC 4.80 4.00   HGB 8.3* 7.4*   HCT 25.4* 23.4*    274     CMP:   Recent Labs   Lab 03/14/19  0446 03/15/19  0448   * 133*   K 4.1 3.7    105   CO2 23 24   * 199*   BUN 29* 26*   CREATININE 1.1 1.1   CALCIUM 7.5* 7.7*   PROT 4.4* 4.6*   ALBUMIN 1.8* 2.4*   BILITOT 0.5 0.5   ALKPHOS 95 86   AST 13 12   ALT 11 10   ANIONGAP 3* 4*   EGFRNONAA >60.0 >60.0       Significant Imaging: I have reviewed all pertinent imaging results/findings within the past 24 hours.

## 2019-03-15 ENCOUNTER — PATIENT MESSAGE (OUTPATIENT)
Dept: CARDIOLOGY | Facility: CLINIC | Age: 64
End: 2019-03-15

## 2019-03-15 PROBLEM — D64.9 ANEMIA: Status: ACTIVE | Noted: 2019-03-15

## 2019-03-15 PROBLEM — K70.31 ALCOHOLIC CIRRHOSIS OF LIVER WITH ASCITES: Status: ACTIVE | Noted: 2019-03-15

## 2019-03-15 LAB
ABO + RH BLD: NORMAL
ALBUMIN SERPL BCP-MCNC: 2.4 G/DL
ALBUMIN SERPL BCP-MCNC: 3.7 G/DL
ALP SERPL-CCNC: 86 U/L
ALP SERPL-CCNC: 87 U/L
ALT SERPL W/O P-5'-P-CCNC: 10 U/L
ALT SERPL W/O P-5'-P-CCNC: 11 U/L
AMYLASE, BODY FLUID: 42 U/L
ANION GAP SERPL CALC-SCNC: 10 MMOL/L
ANION GAP SERPL CALC-SCNC: 4 MMOL/L
APPEARANCE FLD: CLEAR
AST SERPL-CCNC: 12 U/L
AST SERPL-CCNC: 14 U/L
BASOPHILS # BLD AUTO: 0.01 K/UL
BASOPHILS # BLD AUTO: 0.01 K/UL
BASOPHILS NFR BLD: 0.2 %
BASOPHILS NFR BLD: 0.3 %
BILIRUB FLD-MCNC: 0.2 MG/DL
BILIRUB SERPL-MCNC: 0.5 MG/DL
BILIRUB SERPL-MCNC: 1 MG/DL
BLD GP AB SCN CELLS X3 SERPL QL: NORMAL
BLD PROD TYP BPU: NORMAL
BLOOD UNIT EXPIRATION DATE: NORMAL
BLOOD UNIT TYPE CODE: 5100
BLOOD UNIT TYPE: NORMAL
BODY FLD TYPE: NORMAL
BODY FLUID SOURCE AMYLASE: NORMAL
BODY FLUID SOURCE, BILIRUBIN: NORMAL
BODY FLUID SOURCE, LDH: NORMAL
BUN SERPL-MCNC: 22 MG/DL
BUN SERPL-MCNC: 26 MG/DL
CA-I BLDV-SCNC: 1.13 MMOL/L
CALCIUM SERPL-MCNC: 7.7 MG/DL
CALCIUM SERPL-MCNC: 8.6 MG/DL
CHLORIDE SERPL-SCNC: 104 MMOL/L
CHLORIDE SERPL-SCNC: 105 MMOL/L
CO2 SERPL-SCNC: 22 MMOL/L
CO2 SERPL-SCNC: 24 MMOL/L
CODING SYSTEM: NORMAL
COLOR FLD: YELLOW
CREAT SERPL-MCNC: 1.1 MG/DL
CREAT SERPL-MCNC: 1.3 MG/DL
DIFFERENTIAL METHOD: ABNORMAL
DIFFERENTIAL METHOD: ABNORMAL
DISPENSE STATUS: NORMAL
EOSINOPHIL # BLD AUTO: 0 K/UL
EOSINOPHIL # BLD AUTO: 0 K/UL
EOSINOPHIL NFR BLD: 0.2 %
EOSINOPHIL NFR BLD: 1 %
ERYTHROCYTE [DISTWIDTH] IN BLOOD BY AUTOMATED COUNT: 13.8 %
ERYTHROCYTE [DISTWIDTH] IN BLOOD BY AUTOMATED COUNT: 14 %
EST. GFR  (AFRICAN AMERICAN): >60 ML/MIN/1.73 M^2
EST. GFR  (AFRICAN AMERICAN): >60 ML/MIN/1.73 M^2
EST. GFR  (NON AFRICAN AMERICAN): 57.7 ML/MIN/1.73 M^2
EST. GFR  (NON AFRICAN AMERICAN): >60 ML/MIN/1.73 M^2
FERRITIN SERPL-MCNC: 270 NG/ML
GLUCOSE FLD-MCNC: 198 MG/DL
GLUCOSE SERPL-MCNC: 185 MG/DL
GLUCOSE SERPL-MCNC: 199 MG/DL
GRAM STN SPEC: NORMAL
GRAM STN SPEC: NORMAL
HCT VFR BLD AUTO: 23.4 %
HCT VFR BLD AUTO: 29.6 %
HGB BLD-MCNC: 7.4 G/DL
HGB BLD-MCNC: 9.4 G/DL
IMM GRANULOCYTES # BLD AUTO: 0.02 K/UL
IMM GRANULOCYTES # BLD AUTO: 0.04 K/UL
IMM GRANULOCYTES NFR BLD AUTO: 0.5 %
IMM GRANULOCYTES NFR BLD AUTO: 0.8 %
INR PPP: 1.2
INR PPP: 1.2
IRON SERPL-MCNC: 25 UG/DL
LDH FLD L TO P-CCNC: 50 U/L
LDH SERPL L TO P-CCNC: 128 U/L
LYMPHOCYTES # BLD AUTO: 0.8 K/UL
LYMPHOCYTES # BLD AUTO: 1 K/UL
LYMPHOCYTES NFR BLD: 15.9 %
LYMPHOCYTES NFR BLD: 24.5 %
LYMPHOCYTES NFR FLD MANUAL: 76 %
MAGNESIUM SERPL-MCNC: 1.6 MG/DL
MCH RBC QN AUTO: 28.8 PG
MCH RBC QN AUTO: 29.2 PG
MCHC RBC AUTO-ENTMCNC: 31.6 G/DL
MCHC RBC AUTO-ENTMCNC: 31.8 G/DL
MCV RBC AUTO: 91 FL
MCV RBC AUTO: 92 FL
MESOTHL CELL NFR FLD MANUAL: 7 %
MONOCYTES # BLD AUTO: 0 K/UL
MONOCYTES # BLD AUTO: 0.4 K/UL
MONOCYTES NFR BLD: 0.8 %
MONOCYTES NFR BLD: 8.8 %
MONOS+MACROS NFR FLD MANUAL: 5 %
NEUTROPHILS # BLD AUTO: 2.6 K/UL
NEUTROPHILS # BLD AUTO: 4 K/UL
NEUTROPHILS NFR BLD: 64.9 %
NEUTROPHILS NFR BLD: 82.1 %
NEUTROPHILS NFR FLD MANUAL: 12 %
NRBC BLD-RTO: 0 /100 WBC
NRBC BLD-RTO: 0 /100 WBC
PHOSPHATE SERPL-MCNC: 3.1 MG/DL
PLATELET # BLD AUTO: 185 K/UL
PLATELET # BLD AUTO: 274 K/UL
PMV BLD AUTO: 10.5 FL
PMV BLD AUTO: 9.2 FL
POCT GLUCOSE: 185 MG/DL (ref 70–110)
POCT GLUCOSE: 193 MG/DL (ref 70–110)
POCT GLUCOSE: 195 MG/DL (ref 70–110)
POTASSIUM SERPL-SCNC: 3.7 MMOL/L
POTASSIUM SERPL-SCNC: 4.3 MMOL/L
PROT FLD-MCNC: 1.7 G/DL
PROT SERPL-MCNC: 4.6 G/DL
PROT SERPL-MCNC: 5.8 G/DL
PROTHROMBIN TIME: 11.9 SEC
PROTHROMBIN TIME: 11.9 SEC
RBC # BLD AUTO: 2.57 M/UL
RBC # BLD AUTO: 3.22 M/UL
SATURATED IRON: 45 %
SODIUM SERPL-SCNC: 133 MMOL/L
SODIUM SERPL-SCNC: 136 MMOL/L
SPECIMEN SOURCE: NORMAL
SPECIMEN SOURCE: NORMAL
TOTAL IRON BINDING CAPACITY: 55 UG/DL
TRANS ERYTHROCYTES VOL PATIENT: NORMAL ML
TRANSFERRIN SERPL-MCNC: 37 MG/DL
TROPONIN I SERPL DL<=0.01 NG/ML-MCNC: 0.01 NG/ML
WBC # BLD AUTO: 4 K/UL
WBC # BLD AUTO: 4.83 K/UL
WBC # FLD: 300 /CU MM

## 2019-03-15 PROCEDURE — 93010 ELECTROCARDIOGRAM REPORT: CPT | Mod: 76,,, | Performed by: INTERNAL MEDICINE

## 2019-03-15 PROCEDURE — P9021 RED BLOOD CELLS UNIT: HCPCS

## 2019-03-15 PROCEDURE — 85025 COMPLETE CBC W/AUTO DIFF WBC: CPT

## 2019-03-15 PROCEDURE — 11000001 HC ACUTE MED/SURG PRIVATE ROOM

## 2019-03-15 PROCEDURE — 99226 PR SUBSEQUENT OBSERVATION CARE,LEVEL III: ICD-10-PCS | Mod: ,,, | Performed by: NURSE PRACTITIONER

## 2019-03-15 PROCEDURE — 87040 BLOOD CULTURE FOR BACTERIA: CPT

## 2019-03-15 PROCEDURE — 88307 TISSUE EXAM BY PATHOLOGIST: CPT | Mod: 26,,, | Performed by: PATHOLOGY

## 2019-03-15 PROCEDURE — 63600175 PHARM REV CODE 636 W HCPCS: Performed by: RADIOLOGY

## 2019-03-15 PROCEDURE — 83540 ASSAY OF IRON: CPT

## 2019-03-15 PROCEDURE — 63600175 PHARM REV CODE 636 W HCPCS: Mod: JG | Performed by: NURSE PRACTITIONER

## 2019-03-15 PROCEDURE — 82042 OTHER SOURCE ALBUMIN QUAN EA: CPT

## 2019-03-15 PROCEDURE — P9047 ALBUMIN (HUMAN), 25%, 50ML: HCPCS | Mod: JG | Performed by: RADIOLOGY

## 2019-03-15 PROCEDURE — 83615 LACTATE (LD) (LDH) ENZYME: CPT | Mod: 91

## 2019-03-15 PROCEDURE — 82570 ASSAY OF URINE CREATININE: CPT

## 2019-03-15 PROCEDURE — 84484 ASSAY OF TROPONIN QUANT: CPT

## 2019-03-15 PROCEDURE — 82945 GLUCOSE OTHER FLUID: CPT

## 2019-03-15 PROCEDURE — 87075 CULTR BACTERIA EXCEPT BLOOD: CPT

## 2019-03-15 PROCEDURE — 88313 TISSUE SPECIMEN TO PATHOLOGY - SURGERY: ICD-10-PCS | Mod: 26,,, | Performed by: PATHOLOGY

## 2019-03-15 PROCEDURE — P9047 ALBUMIN (HUMAN), 25%, 50ML: HCPCS | Mod: JG | Performed by: NURSE PRACTITIONER

## 2019-03-15 PROCEDURE — 83735 ASSAY OF MAGNESIUM: CPT

## 2019-03-15 PROCEDURE — 36415 COLL VENOUS BLD VENIPUNCTURE: CPT

## 2019-03-15 PROCEDURE — 63600175 PHARM REV CODE 636 W HCPCS: Performed by: STUDENT IN AN ORGANIZED HEALTH CARE EDUCATION/TRAINING PROGRAM

## 2019-03-15 PROCEDURE — 85610 PROTHROMBIN TIME: CPT

## 2019-03-15 PROCEDURE — 88307 TISSUE EXAM BY PATHOLOGIST: CPT | Performed by: PATHOLOGY

## 2019-03-15 PROCEDURE — 82728 ASSAY OF FERRITIN: CPT

## 2019-03-15 PROCEDURE — 84157 ASSAY OF PROTEIN OTHER: CPT

## 2019-03-15 PROCEDURE — 86901 BLOOD TYPING SEROLOGIC RH(D): CPT

## 2019-03-15 PROCEDURE — 88307 TISSUE SPECIMEN TO PATHOLOGY - SURGERY: ICD-10-PCS | Mod: 26,,, | Performed by: PATHOLOGY

## 2019-03-15 PROCEDURE — 87070 CULTURE OTHR SPECIMN AEROBIC: CPT

## 2019-03-15 PROCEDURE — 25000003 PHARM REV CODE 250: Performed by: STUDENT IN AN ORGANIZED HEALTH CARE EDUCATION/TRAINING PROGRAM

## 2019-03-15 PROCEDURE — 80053 COMPREHEN METABOLIC PANEL: CPT

## 2019-03-15 PROCEDURE — 86920 COMPATIBILITY TEST SPIN: CPT

## 2019-03-15 PROCEDURE — 82330 ASSAY OF CALCIUM: CPT

## 2019-03-15 PROCEDURE — 89051 BODY FLUID CELL COUNT: CPT

## 2019-03-15 PROCEDURE — 25000003 PHARM REV CODE 250: Performed by: PHYSICIAN ASSISTANT

## 2019-03-15 PROCEDURE — 93010 EKG 12-LEAD: ICD-10-PCS | Mod: 76,,, | Performed by: INTERNAL MEDICINE

## 2019-03-15 PROCEDURE — 87205 SMEAR GRAM STAIN: CPT

## 2019-03-15 PROCEDURE — 99226 PR SUBSEQUENT OBSERVATION CARE,LEVEL III: CPT | Mod: ,,, | Performed by: NURSE PRACTITIONER

## 2019-03-15 PROCEDURE — 84100 ASSAY OF PHOSPHORUS: CPT

## 2019-03-15 PROCEDURE — 25000003 PHARM REV CODE 250: Performed by: NURSE PRACTITIONER

## 2019-03-15 PROCEDURE — 82247 BILIRUBIN TOTAL: CPT

## 2019-03-15 PROCEDURE — 88313 SPECIAL STAINS GROUP 2: CPT | Mod: 26,,, | Performed by: PATHOLOGY

## 2019-03-15 PROCEDURE — 93005 ELECTROCARDIOGRAM TRACING: CPT

## 2019-03-15 PROCEDURE — 82150 ASSAY OF AMYLASE: CPT

## 2019-03-15 PROCEDURE — 80321 ALCOHOLS BIOMARKERS 1OR 2: CPT

## 2019-03-15 PROCEDURE — 80053 COMPREHEN METABOLIC PANEL: CPT | Mod: 91

## 2019-03-15 PROCEDURE — 83615 LACTATE (LD) (LDH) ENZYME: CPT

## 2019-03-15 RX ORDER — NITROGLYCERIN 0.4 MG/1
0.4 TABLET SUBLINGUAL EVERY 5 MIN PRN
Status: DISCONTINUED | OUTPATIENT
Start: 2019-03-15 | End: 2019-03-21 | Stop reason: HOSPADM

## 2019-03-15 RX ORDER — METOPROLOL TARTRATE 1 MG/ML
5 INJECTION, SOLUTION INTRAVENOUS EVERY 5 MIN PRN
Status: DISCONTINUED | OUTPATIENT
Start: 2019-03-15 | End: 2019-03-21 | Stop reason: HOSPADM

## 2019-03-15 RX ORDER — METOPROLOL TARTRATE 1 MG/ML
5 INJECTION, SOLUTION INTRAVENOUS ONCE
Status: COMPLETED | OUTPATIENT
Start: 2019-03-15 | End: 2019-03-15

## 2019-03-15 RX ORDER — VANCOMYCIN HCL IN 5 % DEXTROSE 1.5G/250ML
1500 PLASTIC BAG, INJECTION (ML) INTRAVENOUS
Status: DISCONTINUED | OUTPATIENT
Start: 2019-03-15 | End: 2019-03-16

## 2019-03-15 RX ORDER — HYDROCODONE BITARTRATE AND ACETAMINOPHEN 500; 5 MG/1; MG/1
TABLET ORAL
Status: DISCONTINUED | OUTPATIENT
Start: 2019-03-15 | End: 2019-03-16

## 2019-03-15 RX ORDER — ALBUMIN HUMAN 250 G/1000ML
SOLUTION INTRAVENOUS
Status: COMPLETED | OUTPATIENT
Start: 2019-03-15 | End: 2019-03-15

## 2019-03-15 RX ORDER — FENTANYL CITRATE 50 UG/ML
INJECTION, SOLUTION INTRAMUSCULAR; INTRAVENOUS CODE/TRAUMA/SEDATION MEDICATION
Status: COMPLETED | OUTPATIENT
Start: 2019-03-15 | End: 2019-03-15

## 2019-03-15 RX ORDER — ONDANSETRON 2 MG/ML
8 INJECTION INTRAMUSCULAR; INTRAVENOUS ONCE
Status: COMPLETED | OUTPATIENT
Start: 2019-03-15 | End: 2019-03-15

## 2019-03-15 RX ORDER — MIDAZOLAM HYDROCHLORIDE 1 MG/ML
INJECTION INTRAMUSCULAR; INTRAVENOUS CODE/TRAUMA/SEDATION MEDICATION
Status: COMPLETED | OUTPATIENT
Start: 2019-03-15 | End: 2019-03-15

## 2019-03-15 RX ORDER — VANCOMYCIN HCL IN 5 % DEXTROSE 1G/250ML
1000 PLASTIC BAG, INJECTION (ML) INTRAVENOUS
Status: DISCONTINUED | OUTPATIENT
Start: 2019-03-15 | End: 2019-03-15

## 2019-03-15 RX ADMIN — SPIRONOLACTONE 100 MG: 100 TABLET, FILM COATED ORAL at 11:03

## 2019-03-15 RX ADMIN — FENTANYL CITRATE 50 MCG: 50 INJECTION, SOLUTION INTRAMUSCULAR; INTRAVENOUS at 06:03

## 2019-03-15 RX ADMIN — ONDANSETRON 8 MG: 2 INJECTION INTRAMUSCULAR; INTRAVENOUS at 08:03

## 2019-03-15 RX ADMIN — MIDAZOLAM HYDROCHLORIDE 1 MG: 1 INJECTION, SOLUTION INTRAMUSCULAR; INTRAVENOUS at 06:03

## 2019-03-15 RX ADMIN — ALBUMIN HUMAN 25 G: 0.25 SOLUTION INTRAVENOUS at 06:03

## 2019-03-15 RX ADMIN — FUROSEMIDE 40 MG: 40 TABLET ORAL at 08:03

## 2019-03-15 RX ADMIN — ALBUMIN HUMAN 25 G: 0.25 SOLUTION INTRAVENOUS at 12:03

## 2019-03-15 RX ADMIN — METOPROLOL TARTRATE 5 MG: 5 INJECTION, SOLUTION INTRAVENOUS at 08:03

## 2019-03-15 RX ADMIN — ALBUMIN (HUMAN) 12.5 G: 25 SOLUTION INTRAVENOUS at 06:03

## 2019-03-15 RX ADMIN — ATORVASTATIN CALCIUM 40 MG: 20 TABLET, FILM COATED ORAL at 08:03

## 2019-03-15 RX ADMIN — MIDAZOLAM HYDROCHLORIDE 2 MG: 1 INJECTION, SOLUTION INTRAMUSCULAR; INTRAVENOUS at 05:03

## 2019-03-15 RX ADMIN — FENTANYL CITRATE 50 MCG: 50 INJECTION, SOLUTION INTRAMUSCULAR; INTRAVENOUS at 05:03

## 2019-03-15 RX ADMIN — ALBUMIN (HUMAN) 25 G: 25 SOLUTION INTRAVENOUS at 05:03

## 2019-03-15 NOTE — PLAN OF CARE
Patient resting quietly in bed when CM rounded. No family present. Patient scheduled to have a liver biopsy & paracentesis done today. Patient stated that he is currently receiving home health care from [facility]. CM informed Dianne ([id number]) w/OH of patient's hospitalization. Dinane stated that the patient is currently receiving NSG, PT, & OT. Plan to discharge patient home with [facility] or home with support when medically stable. Will continue to follow.

## 2019-03-15 NOTE — PROGRESS NOTES
Transjugular liver biopsy complete, dressing applied to right neck puncture site, CDI. Specimen walked to pathology. No acute events. Pt to [facility] for recovery.

## 2019-03-15 NOTE — PROGRESS NOTES
[facility] Hospital Medicine  Progress Note    Patient Name: Jian Arrieta  MRN: [id number]  Patient Class: OP- Observation   Admission Date: 3/12/2019  Length of Stay: 0 days  Attending Physician: SELENA Castillo MD  Primary Care Provider: Samir Mahmood MD    [facility] Medicine Team: [facility]OS[facility] MED F Yessica Dc NP    Subjective:     Principal Problem:Ascites    HPI:  Patient is a 62yo male with a PMHx of CAD (s/p PCI with 5 stents) with subsequent CABG x 3v (2017 at ), HLD, DM2, BERHANE on CPAP, EtOh cirrhosis being admitted to observation for shortness of breath due to ascites. Patient reports worsening shortness of breath for the last 4 weeks with associated LE edema and belly distension. He reports having a therapeutic paracentesis 4 weeks ago which removed 5L of fluid.  His body swelling and SOB improved temporarily then started to worsen again. Now he is SOB with minimal exertion. Patient at his baseline is able to walk at his house from one end to the other. Now he is unable to ambulate three steps without getting SOB. He reports associated exertional chest pain feels like heaviness. He denies LOC, orthopnea and PND. He denies fever, cough, chills, rigors and weight gain. Patient states chronic constipation and hard stool and relates it to iron intake. His wife reports that sometime he becomes confused. He reports compliance to his medications.    In the ED, vitals stable. Intake labs remarkable for Na 130, Cr 1.6. Troponin 0.006 x 2. CTA showed abdominal ascites and stable pancreatic findings.    Hospital Course:  Patient admitted for paracentesis. 10.8L removed. Liver us Cirrhotic appearing liver with sequela of portal hypertension including severe volume ascites and splenomegaly. Hepatology saw patient, assistance appreciated. Paracentesis with fluid studies and liver biopsy today. H&H slowly decreasing. Patient blood consent obtained. Will transfuse one unit. Continue albumin.      Interval History: Patient states he feels he reaccumulated some fluid overnight but is still much improved from previous. Hepatology seeing patient. Patient updated on plan and agrees. Patient noted to be hypotensive and tachycardic. Will start albumin.     Review of Systems   Constitutional: Positive for activity change. Negative for chills and fever.   Eyes: Negative for photophobia and visual disturbance.   Respiratory: Positive for shortness of breath (improved). Negative for cough, chest tightness and wheezing.    Cardiovascular: Positive for leg swelling. Negative for chest pain (with exertion) and palpitations.   Gastrointestinal: Positive for abdominal distention (much improved). Negative for abdominal pain, constipation, diarrhea, nausea and vomiting.   Endocrine: Positive for cold intolerance.   Genitourinary: Negative for dysuria, hematuria and urgency.   Musculoskeletal: Negative for back pain and neck pain.   Skin: Negative for rash and wound.   Neurological: Positive for weakness. Negative for dizziness, facial asymmetry, speech difficulty and light-headedness.   Psychiatric/Behavioral: Negative for agitation and confusion. The patient is not nervous/anxious.      Objective:     Vital Signs (Most Recent):  Temp: 97.7 °F (36.5 °C) (03/14/19 1539)  Pulse: (!) 112 (03/14/19 1539)  Resp: 18 (03/14/19 1539)  BP: 136/67 (03/14/19 1539)  SpO2: 99 % (03/14/19 1539) Vital Signs (24h Range):  Temp:  [97.5 °F (36.4 °C)-99.9 °F (37.7 °C)] 97.7 °F (36.5 °C)  Pulse:  [100-130] 112  Resp:  [18] 18  SpO2:  [97 %-100 %] 99 %  BP: (110-136)/(56-67) 136/67     Weight: 134.6 kg (296 lb 13.6 oz)  Body mass index is 46.49 kg/m².    Intake/Output Summary (Last 24 hours) at 3/14/2019 1634  Last data filed at 3/14/2019 1500  Gross per 24 hour   Intake 840 ml   Output 1325 ml   Net -485 ml      Physical Exam   Constitutional: He is oriented to person, place, and time. He appears well-developed and well-nourished. No  distress.   Cardiovascular: Normal rate and regular rhythm.   Murmur heard.  Pulmonary/Chest: Effort normal. He has no wheezes. He has no rales.   Abdominal: Soft. Bowel sounds are normal. He exhibits distension (improved), fluid wave and ascites. He exhibits no mass. There is no tenderness. There is no rebound and no guarding.   Musculoskeletal: Normal range of motion. He exhibits edema (BLE). He exhibits no tenderness.   Neurological: He is alert and oriented to person, place, and time. No cranial nerve deficit or sensory deficit.   Skin: Skin is warm and dry. Capillary refill takes less than 2 seconds. He is not diaphoretic.   Scattered ecchymoses   Psychiatric: He has a normal mood and affect. His behavior is normal. Thought content normal.       Significant Labs:   Bilirubin:   Recent Labs   Lab 02/23/19  1918 03/12/19  2109 03/13/19  0356 03/14/19 0446 03/15/19  0448   BILITOT 0.4 0.4 0.4 0.5 0.5     CBC:   Recent Labs   Lab 03/14/19 0446 03/15/19  0448   WBC 4.80 4.00   HGB 8.3* 7.4*   HCT 25.4* 23.4*    274     CMP:   Recent Labs   Lab 03/14/19 0446 03/15/19  0448   * 133*   K 4.1 3.7    105   CO2 23 24   * 199*   BUN 29* 26*   CREATININE 1.1 1.1   CALCIUM 7.5* 7.7*   PROT 4.4* 4.6*   ALBUMIN 1.8* 2.4*   BILITOT 0.5 0.5   ALKPHOS 95 86   AST 13 12   ALT 11 10   ANIONGAP 3* 4*   EGFRNONAA >60.0 >60.0       Significant Imaging: I have reviewed all pertinent imaging results/findings within the past 24 hours.    Assessment/Plan:      * Ascites    Shortness of breath    Patient with known cirrhosis and systolic heart failure presenting with worsening SOB, ascites, and LE edema for 4 weeks.  - CTA + ascites and edema  -IR paracentesis with 10.8L off  - Liver US cirrhotic appearing liver with sequela of portal hypertension including severe volume ascites and splenomegaly  - hepatology recommendations:   daily CMP, CBC, INR  Strict I/o  Renally dose medications  Repeat paracentesis with  cell count/diff, total protein, albumin, gram stain, culture, and amylase  Obtain Ir transjugular liver biopsy with portal pressure  - Patient states increased debility due to weight gain and SOB--PT/OT to evaluate  - strict Is/Os  - daily weights  - hold ASA     Anemia    - Hgb 9.3>8.3>7.4  - patient with new onset cold intolerance  - PRBC one unit  - blood consent obtained  - Iron 25, TIBC 55, transferrin 37--likely anemia of chronic disease    - will continue to monitor         ELIEZER (acute kidney injury)    Baseline Cr 1.0-1.2  - Cr 1.6 on admission  -1.3--resolved  - daily BMP  - hold ACE and diuretics  - unclear if hepatorenal etiology     DM type 2 with diabetic peripheral neuropathy    - hold home insulin  - low dose SSI while NPO     Chronic combined systolic and diastolic heart failure    - holding diuretics in setting of ELIEZER  - strict Is/Os, daily weights  - holding ACE     Essential hypertension    - hold home BP meds as hypotensive     Chronic pancreatitis    - continue enzymes  - CT stable     Coronary artery disease due to calcified coronary lesion    - reports history of 5 stents with CABG at  in 2017  - scheduled for PET stress next week to evaluate patency  - EKG without ischemic changes  - troponin negative x 2       VTE Risk Mitigation (From admission, onward)        Ordered     IP VTE HIGH RISK PATIENT  Once      03/13/19 1409     Place sequential compression device  Until discontinued      03/13/19 1409     Place BRANNON hose  Until discontinued      03/13/19 0310     Place sequential compression device  Until discontinued      03/13/19 0310              Yessica Dc NP  Department of Hospital Medicine   [facility]

## 2019-03-15 NOTE — PLAN OF CARE
Problem: Adult Inpatient Plan of Care  Goal: Plan of Care Review  Outcome: Ongoing (interventions implemented as appropriate)  Pt with no falls or injuries this shift. Pt with no s/s hypo or hyperglycemia this shift. Pt afebrile, no s/s infection. Dressing dry and intact to right abd post procedure.

## 2019-03-15 NOTE — NURSING
Pt to IR via stretcher. Pt accompanied by transport associate and charge nurse. Blood being transfused. No distress noted.

## 2019-03-15 NOTE — PROCEDURES
Radiology Post-Procedure Note    Pre Op Diagnosis: IV access needed    Post Op Diagnosis: Same    Procedure: PICC placement    Procedure performed by: Trent Denny MD and ANDREW Valerio FNP    Written Informed Consent Obtained: Yes    Specimen Removed: No    Estimated Blood Loss: Minimal    Findings:   Using realtime U/S guidance a 5 Fr PICC catheter was placed into the right Basilic Vein with tip of the catheter in the SVC.    PICC is ready for use.     ANDREW Valerio FNP  Interventional Radiology  [phone] [employer]

## 2019-03-15 NOTE — PROCEDURES
Radiology Post-Procedure Note    Pre Op Diagnosis: Ascites  Post Op Diagnosis: Same    Procedure: Ultrasound Guided Paracentesis    Procedure performed by: Cele GARCIA, Reyna     Written Informed Consent Obtained: Yes  Specimen Removed: YES clear yellow  Estimated Blood Loss: Minimal    Findings:   Successful paracentesis.  Albumin administered PRN per protocol.    Patient tolerated procedure well.    Reyna Oakley, APRN, FNP  Interventional Radiology  [phone] [employer]

## 2019-03-15 NOTE — ASSESSMENT & PLAN NOTE
- Hgb 9.3>8.3>7.4  - patient with new onset cold intolerance  - PRBC one unit  - blood consent obtained  - Iron 25, TIBC 55, transferrin 37--likely anemia of chronic disease    - will continue to monitor

## 2019-03-15 NOTE — ASSESSMENT & PLAN NOTE
Shortness of breath    Patient with known cirrhosis and systolic heart failure presenting with worsening SOB, ascites, and LE edema for 4 weeks.  - CTA + ascites and edema  -IR paracentesis with 10.8L off  - Liver US cirrhotic appearing liver with sequela of portal hypertension including severe volume ascites and splenomegaly  - hepatology recommendations:   daily CMP, CBC, INR  Strict I/o  Renally dose medications  Repeat paracentesis with cell count/diff, total protein, albumin, gram stain, culture, and amylase  Obtain Ir transjugular liver biopsy with portal pressure  - Patient states increased debility due to weight gain and SOB--PT/OT to evaluate  - strict Is/Os  - daily weights  - hold ASA

## 2019-03-16 PROBLEM — I48.0 PAROXYSMAL ATRIAL FIBRILLATION: Status: ACTIVE | Noted: 2019-03-16

## 2019-03-16 PROBLEM — I21.4 NSTEMI (NON-ST ELEVATED MYOCARDIAL INFARCTION): Status: ACTIVE | Noted: 2019-03-16

## 2019-03-16 LAB
ALBUMIN FLD-MCNC: 0.9 G/DL
ALBUMIN SERPL BCP-MCNC: 2.6 G/DL
ALBUMIN SERPL BCP-MCNC: 2.7 G/DL
ALP SERPL-CCNC: 68 U/L
ALP SERPL-CCNC: 75 U/L
ALT SERPL W/O P-5'-P-CCNC: 11 U/L
ALT SERPL W/O P-5'-P-CCNC: 9 U/L
ANION GAP SERPL CALC-SCNC: 5 MMOL/L
ANION GAP SERPL CALC-SCNC: 6 MMOL/L
APTT BLDCRRT: 39.9 SEC
AST SERPL-CCNC: 21 U/L
AST SERPL-CCNC: 34 U/L
BASOPHILS # BLD AUTO: 0.01 K/UL
BASOPHILS # BLD AUTO: 0.01 K/UL
BASOPHILS NFR BLD: 0.1 %
BASOPHILS NFR BLD: 0.2 %
BILIRUB SERPL-MCNC: 0.7 MG/DL
BILIRUB SERPL-MCNC: 1.9 MG/DL
BLD PROD TYP BPU: NORMAL
BLOOD UNIT EXPIRATION DATE: NORMAL
BLOOD UNIT TYPE CODE: 5100
BLOOD UNIT TYPE: NORMAL
BODY FLUID SOURCE, CREATININE: NORMAL
BUN SERPL-MCNC: 21 MG/DL
BUN SERPL-MCNC: 22 MG/DL
CALCIUM SERPL-MCNC: 7.6 MG/DL
CALCIUM SERPL-MCNC: 7.7 MG/DL
CHLORIDE SERPL-SCNC: 103 MMOL/L
CHLORIDE SERPL-SCNC: 105 MMOL/L
CO2 SERPL-SCNC: 23 MMOL/L
CO2 SERPL-SCNC: 24 MMOL/L
CODING SYSTEM: NORMAL
CREAT FLD-MCNC: 1 MG/DL
CREAT SERPL-MCNC: 1.2 MG/DL
CREAT SERPL-MCNC: 1.2 MG/DL
DIFFERENTIAL METHOD: ABNORMAL
DIFFERENTIAL METHOD: ABNORMAL
DISPENSE STATUS: NORMAL
EOSINOPHIL # BLD AUTO: 0 K/UL
EOSINOPHIL # BLD AUTO: 0 K/UL
EOSINOPHIL NFR BLD: 0 %
EOSINOPHIL NFR BLD: 0.1 %
ERYTHROCYTE [DISTWIDTH] IN BLOOD BY AUTOMATED COUNT: 13.9 %
ERYTHROCYTE [DISTWIDTH] IN BLOOD BY AUTOMATED COUNT: 15.4 %
EST. GFR  (AFRICAN AMERICAN): >60 ML/MIN/1.73 M^2
EST. GFR  (AFRICAN AMERICAN): >60 ML/MIN/1.73 M^2
EST. GFR  (NON AFRICAN AMERICAN): >60 ML/MIN/1.73 M^2
EST. GFR  (NON AFRICAN AMERICAN): >60 ML/MIN/1.73 M^2
GLUCOSE SERPL-MCNC: 308 MG/DL
GLUCOSE SERPL-MCNC: 396 MG/DL
HCT VFR BLD AUTO: 24.4 %
HCT VFR BLD AUTO: 26.8 %
HCT VFR BLD AUTO: 30 %
HGB BLD-MCNC: 7.8 G/DL
HGB BLD-MCNC: 9 G/DL
HGB BLD-MCNC: 9.7 G/DL
IMM GRANULOCYTES # BLD AUTO: 0.03 K/UL
IMM GRANULOCYTES # BLD AUTO: 0.03 K/UL
IMM GRANULOCYTES NFR BLD AUTO: 0.4 %
IMM GRANULOCYTES NFR BLD AUTO: 0.5 %
INR PPP: 1.4
INR PPP: 1.4
LYMPHOCYTES # BLD AUTO: 0.4 K/UL
LYMPHOCYTES # BLD AUTO: 0.7 K/UL
LYMPHOCYTES NFR BLD: 6.6 %
LYMPHOCYTES NFR BLD: 9.7 %
MCH RBC QN AUTO: 28.4 PG
MCH RBC QN AUTO: 29.1 PG
MCHC RBC AUTO-ENTMCNC: 32 G/DL
MCHC RBC AUTO-ENTMCNC: 32.3 G/DL
MCV RBC AUTO: 88 FL
MCV RBC AUTO: 91 FL
MONOCYTES # BLD AUTO: 0.4 K/UL
MONOCYTES # BLD AUTO: 0.5 K/UL
MONOCYTES NFR BLD: 6.6 %
MONOCYTES NFR BLD: 7 %
NEUTROPHILS # BLD AUTO: 4.7 K/UL
NEUTROPHILS # BLD AUTO: 5.5 K/UL
NEUTROPHILS NFR BLD: 82.7 %
NEUTROPHILS NFR BLD: 86.1 %
NRBC BLD-RTO: 0 /100 WBC
NRBC BLD-RTO: 0 /100 WBC
PLATELET # BLD AUTO: 202 K/UL
PLATELET # BLD AUTO: 219 K/UL
PMV BLD AUTO: 9.2 FL
PMV BLD AUTO: 9.4 FL
POCT GLUCOSE: 305 MG/DL (ref 70–110)
POCT GLUCOSE: 336 MG/DL (ref 70–110)
POCT GLUCOSE: 375 MG/DL (ref 70–110)
POCT GLUCOSE: 401 MG/DL (ref 70–110)
POTASSIUM SERPL-SCNC: 3.8 MMOL/L
POTASSIUM SERPL-SCNC: 3.9 MMOL/L
PROT SERPL-MCNC: 4.3 G/DL
PROT SERPL-MCNC: 4.4 G/DL
PROTHROMBIN TIME: 14.1 SEC
PROTHROMBIN TIME: 14.1 SEC
RBC # BLD AUTO: 2.68 M/UL
RBC # BLD AUTO: 3.41 M/UL
SODIUM SERPL-SCNC: 132 MMOL/L
SODIUM SERPL-SCNC: 134 MMOL/L
SPECIMEN SOURCE: NORMAL
TRANS ERYTHROCYTES VOL PATIENT: NORMAL ML
TROPONIN I SERPL DL<=0.01 NG/ML-MCNC: 2.67 NG/ML
TROPONIN I SERPL DL<=0.01 NG/ML-MCNC: 3.01 NG/ML
TROPONIN I SERPL DL<=0.01 NG/ML-MCNC: 3.46 NG/ML
TROPONIN I SERPL DL<=0.01 NG/ML-MCNC: 3.46 NG/ML
VANCOMYCIN SERPL-MCNC: 15.7 UG/ML
WBC # BLD AUTO: 5.46 K/UL
WBC # BLD AUTO: 6.69 K/UL

## 2019-03-16 PROCEDURE — 80202 ASSAY OF VANCOMYCIN: CPT

## 2019-03-16 PROCEDURE — 84484 ASSAY OF TROPONIN QUANT: CPT | Mod: 91

## 2019-03-16 PROCEDURE — 99233 PR SUBSEQUENT HOSPITAL CARE,LEVL III: ICD-10-PCS | Mod: ,,, | Performed by: NURSE PRACTITIONER

## 2019-03-16 PROCEDURE — 36430 TRANSFUSION BLD/BLD COMPNT: CPT

## 2019-03-16 PROCEDURE — S5571 INSULIN DISPOS PEN 3 ML: HCPCS | Performed by: NURSE PRACTITIONER

## 2019-03-16 PROCEDURE — 93005 ELECTROCARDIOGRAM TRACING: CPT

## 2019-03-16 PROCEDURE — 99222 1ST HOSP IP/OBS MODERATE 55: CPT | Mod: 25,,, | Performed by: INTERNAL MEDICINE

## 2019-03-16 PROCEDURE — 25000003 PHARM REV CODE 250: Performed by: PHYSICIAN ASSISTANT

## 2019-03-16 PROCEDURE — 63600175 PHARM REV CODE 636 W HCPCS: Performed by: PHYSICIAN ASSISTANT

## 2019-03-16 PROCEDURE — 36415 COLL VENOUS BLD VENIPUNCTURE: CPT

## 2019-03-16 PROCEDURE — 97165 OT EVAL LOW COMPLEX 30 MIN: CPT

## 2019-03-16 PROCEDURE — 85014 HEMATOCRIT: CPT

## 2019-03-16 PROCEDURE — 63600175 PHARM REV CODE 636 W HCPCS: Performed by: NURSE PRACTITIONER

## 2019-03-16 PROCEDURE — 93010 ELECTROCARDIOGRAM REPORT: CPT | Mod: ,,, | Performed by: INTERNAL MEDICINE

## 2019-03-16 PROCEDURE — 85025 COMPLETE CBC W/AUTO DIFF WBC: CPT

## 2019-03-16 PROCEDURE — 85730 THROMBOPLASTIN TIME PARTIAL: CPT

## 2019-03-16 PROCEDURE — P9047 ALBUMIN (HUMAN), 25%, 50ML: HCPCS | Mod: JG | Performed by: NURSE PRACTITIONER

## 2019-03-16 PROCEDURE — 85018 HEMOGLOBIN: CPT

## 2019-03-16 PROCEDURE — 11000001 HC ACUTE MED/SURG PRIVATE ROOM

## 2019-03-16 PROCEDURE — P9021 RED BLOOD CELLS UNIT: HCPCS

## 2019-03-16 PROCEDURE — 99222 PR INITIAL HOSPITAL CARE,LEVL II: ICD-10-PCS | Mod: 25,,, | Performed by: INTERNAL MEDICINE

## 2019-03-16 PROCEDURE — 25000003 PHARM REV CODE 250: Performed by: NURSE PRACTITIONER

## 2019-03-16 PROCEDURE — 97530 THERAPEUTIC ACTIVITIES: CPT

## 2019-03-16 PROCEDURE — 93010 EKG 12-LEAD: ICD-10-PCS | Mod: ,,, | Performed by: INTERNAL MEDICINE

## 2019-03-16 PROCEDURE — 85610 PROTHROMBIN TIME: CPT

## 2019-03-16 PROCEDURE — 85730 THROMBOPLASTIN TIME PARTIAL: CPT | Mod: 91

## 2019-03-16 PROCEDURE — 80053 COMPREHEN METABOLIC PANEL: CPT | Mod: 91

## 2019-03-16 PROCEDURE — 99233 SBSQ HOSP IP/OBS HIGH 50: CPT | Mod: ,,, | Performed by: NURSE PRACTITIONER

## 2019-03-16 PROCEDURE — 85610 PROTHROMBIN TIME: CPT | Mod: 91

## 2019-03-16 RX ORDER — HEPARIN SODIUM,PORCINE/D5W 25000/250
12 INTRAVENOUS SOLUTION INTRAVENOUS CONTINUOUS
Status: DISCONTINUED | OUTPATIENT
Start: 2019-03-16 | End: 2019-03-20

## 2019-03-16 RX ORDER — HYDROCODONE BITARTRATE AND ACETAMINOPHEN 500; 5 MG/1; MG/1
TABLET ORAL
Status: DISCONTINUED | OUTPATIENT
Start: 2019-03-16 | End: 2019-03-21 | Stop reason: HOSPADM

## 2019-03-16 RX ORDER — HEPARIN SODIUM,PORCINE/D5W 25000/250
12 INTRAVENOUS SOLUTION INTRAVENOUS CONTINUOUS
Status: DISCONTINUED | OUTPATIENT
Start: 2019-03-16 | End: 2019-03-16

## 2019-03-16 RX ORDER — IBUPROFEN 200 MG
16 TABLET ORAL
Status: DISCONTINUED | OUTPATIENT
Start: 2019-03-16 | End: 2019-03-21 | Stop reason: HOSPADM

## 2019-03-16 RX ORDER — ENOXAPARIN SODIUM 100 MG/ML
1 INJECTION SUBCUTANEOUS EVERY 12 HOURS
Status: COMPLETED | OUTPATIENT
Start: 2019-03-16 | End: 2019-03-16

## 2019-03-16 RX ORDER — INSULIN ASPART 100 [IU]/ML
1-10 INJECTION, SOLUTION INTRAVENOUS; SUBCUTANEOUS
Status: DISCONTINUED | OUTPATIENT
Start: 2019-03-16 | End: 2019-03-21 | Stop reason: HOSPADM

## 2019-03-16 RX ORDER — PANTOPRAZOLE SODIUM 40 MG/1
40 TABLET, DELAYED RELEASE ORAL DAILY
Status: DISCONTINUED | OUTPATIENT
Start: 2019-03-16 | End: 2019-03-21 | Stop reason: HOSPADM

## 2019-03-16 RX ORDER — ASPIRIN 325 MG
325 TABLET ORAL DAILY
Status: DISCONTINUED | OUTPATIENT
Start: 2019-03-16 | End: 2019-03-17

## 2019-03-16 RX ORDER — IBUPROFEN 200 MG
24 TABLET ORAL
Status: DISCONTINUED | OUTPATIENT
Start: 2019-03-16 | End: 2019-03-21 | Stop reason: HOSPADM

## 2019-03-16 RX ADMIN — SPIRONOLACTONE 100 MG: 100 TABLET, FILM COATED ORAL at 09:03

## 2019-03-16 RX ADMIN — ALBUMIN HUMAN 25 G: 0.25 SOLUTION INTRAVENOUS at 05:03

## 2019-03-16 RX ADMIN — VANCOMYCIN HYDROCHLORIDE 1500 MG: 100 INJECTION, POWDER, LYOPHILIZED, FOR SOLUTION INTRAVENOUS at 09:03

## 2019-03-16 RX ADMIN — PANCRELIPASE 3 CAPSULE: 60000; 12000; 38000 CAPSULE, DELAYED RELEASE PELLETS ORAL at 09:03

## 2019-03-16 RX ADMIN — INSULIN DETEMIR 7 UNITS: 100 INJECTION, SOLUTION SUBCUTANEOUS at 01:03

## 2019-03-16 RX ADMIN — ASPIRIN 325 MG ORAL TABLET 325 MG: 325 PILL ORAL at 09:03

## 2019-03-16 RX ADMIN — VANCOMYCIN HYDROCHLORIDE 1500 MG: 100 INJECTION, POWDER, LYOPHILIZED, FOR SOLUTION INTRAVENOUS at 12:03

## 2019-03-16 RX ADMIN — FUROSEMIDE 40 MG: 40 TABLET ORAL at 09:03

## 2019-03-16 RX ADMIN — TICAGRELOR 180 MG: 90 TABLET ORAL at 04:03

## 2019-03-16 RX ADMIN — PANTOPRAZOLE SODIUM 40 MG: 40 TABLET, DELAYED RELEASE ORAL at 12:03

## 2019-03-16 RX ADMIN — ENOXAPARIN SODIUM 130 MG: 100 INJECTION SUBCUTANEOUS at 04:03

## 2019-03-16 RX ADMIN — INSULIN ASPART 3 UNITS: 100 INJECTION, SOLUTION INTRAVENOUS; SUBCUTANEOUS at 12:03

## 2019-03-16 RX ADMIN — INSULIN ASPART 5 UNITS: 100 INJECTION, SOLUTION INTRAVENOUS; SUBCUTANEOUS at 11:03

## 2019-03-16 RX ADMIN — ATORVASTATIN CALCIUM 40 MG: 20 TABLET, FILM COATED ORAL at 09:03

## 2019-03-16 RX ADMIN — PANCRELIPASE 3 CAPSULE: 60000; 12000; 38000 CAPSULE, DELAYED RELEASE PELLETS ORAL at 11:03

## 2019-03-16 RX ADMIN — HEPARIN SODIUM AND DEXTROSE 12 UNITS/KG/HR: 10000; 5 INJECTION INTRAVENOUS at 04:03

## 2019-03-16 RX ADMIN — ALBUMIN HUMAN 25 G: 0.25 SOLUTION INTRAVENOUS at 11:03

## 2019-03-16 RX ADMIN — ONDANSETRON 8 MG: 8 TABLET, ORALLY DISINTEGRATING ORAL at 01:03

## 2019-03-16 RX ADMIN — ALBUMIN HUMAN 25 G: 0.25 SOLUTION INTRAVENOUS at 12:03

## 2019-03-16 NOTE — ASSESSMENT & PLAN NOTE
Shortness of breath    Patient with known cirrhosis and systolic heart failure presenting with worsening SOB, ascites, and LE edema for 4 weeks.  - CTA + ascites and edema  -IR paracentesis with 10.8L off; second para with 6 L off   - Liver US cirrhotic appearing liver with sequela of portal hypertension including severe volume ascites and splenomegaly  - hepatology recommendations:   daily CMP, CBC, INR  Strict I/o  Renally dose medications  Obtain Ir transjugular liver biopsy with portal pressure  Fluid analysis: Amylase 42; bilirubin 0.2; creatinine 1; glucose 198; LD 98; albumin 0.9  - Patient states increased debility due to weight gain and SOB--PT/OT recommend home  - strict Is/Os  - daily weights

## 2019-03-16 NOTE — NURSING
Orders for pt to receive 1 unit PRBC's and a heparin drip placed. Due to contraindications, blood and heparin can not be administered at the same time. This was confirmed with the blood bank. These findings were shared with the ordering physician, LANCE Nguyen PA-C([facility]). It was decided that the pt should receive the unit of PRBC's first and that the anticoagulant therapy will be addressed accordingly. Nurse assigned to pt was made aware of this plan and all agreed on plan. Pt is in bed resting with eyes closed, no immediate distress noted, denies pain and discomfort. Staff will continue to monitor.

## 2019-03-16 NOTE — PLAN OF CARE
Problem: Fall Injury Risk  Goal: Absence of Fall and Fall-Related Injury    Intervention: Identify and Manage Contributors to Fall Injury Risk   03/15/19 2205 03/16/19 0400   Manage Acute Allergic Reaction   Medication Review/Management medications reviewed;high risk medications identified --    Identify and Manage Contributors to Fall Injury Risk   Self-Care Promotion --  independence encouraged;BADL personal objects within reach     Intervention: Promote Injury-Free Environment   03/15/19 2205 03/16/19 0400   Optimize Paradox and Functional Mobility   Environmental Safety Modification assistive device/personal items within reach;clutter free environment maintained;lighting adjusted --    Optimize Balance and Safe Activity   Safety Promotion/Fall Prevention --  assistive device/personal item within reach;bed alarm set;commode/urinal/bedpan at bedside;diversional activities provided;Fall Risk reviewed with patient/family;Fall Risk signage in place;high risk medications identified;lighting adjusted;medications reviewed;nonskid shoes/socks when out of bed;side rails raised x 2;instructed to call staff for mobility         Problem: Adult Inpatient Plan of Care  Goal: Plan of Care Review  Outcome: Ongoing (interventions implemented as appropriate)   03/16/19 0421   Plan of Care Review   Plan of Care Reviewed With patient     Goal: Absence of Hospital-Acquired Illness or Injury    Intervention: Identify and Manage Fall Risk   03/16/19 0400   Optimize Balance and Safe Activity   Safety Promotion/Fall Prevention assistive device/personal item within reach;bed alarm set;commode/urinal/bedpan at bedside;diversional activities provided;Fall Risk reviewed with patient/family;Fall Risk signage in place;high risk medications identified;lighting adjusted;medications reviewed;nonskid shoes/socks when out of bed;side rails raised x 2;instructed to call staff for mobility     Intervention: Prevent VTE (venous thromboembolism)    03/15/19 2205   Prevent or Manage Embolism   VTE Prevention/Management ambulation promoted       Goal: Optimal Comfort and Wellbeing    Intervention: Provide Person-Centered Care   03/16/19 0421   Support Dyspnea Relief   Trust Relationship/Rapport care explained;choices provided;emotional support provided;empathic listening provided;questions answered;questions encouraged;reassurance provided;thoughts/feelings acknowledged         Problem: Diabetes Comorbidity  Goal: Blood Glucose Level Within Desired Range    Intervention: Maintain Glycemic Control   03/16/19 0421   Monitor and Manage Ketoacidosis   Glycemic Management blood glucose monitoring         Problem: Infection  Goal: Infection Symptom Resolution    Intervention: Prevent or Manage Infection   03/16/19 0421   Prevent or Manage Infection   Fever Reduction/Comfort Measures lightweight bedding;lightweight clothing   Infection Management aseptic technique maintained   Manage Diarrhea   Isolation Precautions protective environment maintained         Problem: Skin Injury Risk Increased  Goal: Skin Health and Integrity    Intervention: Optimize Skin Protection   03/15/19 2205   Prevent Additional Skin Injury   Head of Bed (HOB) HOB at 30-45 degrees   Pressure Reduction Devices pressure-redistributing mattress utilized   Pressure Reduction Techniques frequent weight shift encouraged;weight shift assistance provided   Monitor and Manage Hypervolemia   Skin Protection adhesive use limited;tubing/devices free from skin contact     Intervention: Promote and Optimize Oral Intake   03/16/19 0421   Monitor and Manage Anemia   Oral Nutrition Promotion rest periods promoted

## 2019-03-16 NOTE — SUBJECTIVE & OBJECTIVE
Interval History: Patient resting in bed. No complaints on chest pain at this time. Patient in NSR. Patient updated on plan of care and all questions answered.     Review of Systems   Constitutional: Positive for activity change. Negative for chills and fever.   Eyes: Negative for photophobia and visual disturbance.   Respiratory: Positive for shortness of breath (with exertion ). Negative for cough, chest tightness and wheezing.    Cardiovascular: Positive for chest pain (resolved) and leg swelling. Negative for palpitations.   Gastrointestinal: Positive for abdominal distention. Negative for abdominal pain, constipation, diarrhea, nausea and vomiting.   Endocrine: Negative for cold intolerance.   Genitourinary: Negative for dysuria, hematuria and urgency.   Musculoskeletal: Negative for back pain and neck pain.   Skin: Negative for rash and wound.   Neurological: Positive for weakness. Negative for dizziness, facial asymmetry, speech difficulty and light-headedness.   Psychiatric/Behavioral: Negative for agitation and confusion. The patient is not nervous/anxious.      Objective:     Vital Signs (Most Recent):  Temp: 97.3 °F (36.3 °C) (03/16/19 1230)  Pulse: 96 (03/16/19 1230)  Resp: 14 (03/16/19 1230)  BP: (!) 105/56 (03/16/19 1230)  SpO2: 97 % (03/16/19 1230) Vital Signs (24h Range):  Temp:  [96.1 °F (35.6 °C)-99.8 °F (37.7 °C)] 97.3 °F (36.3 °C)  Pulse:  [] 96  Resp:  [12-20] 14  SpO2:  [90 %-100 %] 97 %  BP: ()/(48-82) 105/56     Weight: 134.6 kg (296 lb 13.6 oz)  Body mass index is 46.49 kg/m².    Intake/Output Summary (Last 24 hours) at 3/16/2019 1533  Last data filed at 3/16/2019 0839  Gross per 24 hour   Intake 1109.17 ml   Output 6250 ml   Net -5140.83 ml      Physical Exam   Constitutional: He is oriented to person, place, and time. He appears well-developed and well-nourished. No distress.   Cardiovascular: Normal rate and regular rhythm.   Pulmonary/Chest: Effort normal. He has no wheezes.  He has no rales.   Abdominal: Soft. Bowel sounds are normal. He exhibits distension (improved), fluid wave and ascites. There is no tenderness. There is no guarding.   Musculoskeletal: Normal range of motion. He exhibits edema (BLE). He exhibits no tenderness.   Neurological: He is alert and oriented to person, place, and time. No cranial nerve deficit or sensory deficit.   Skin: Skin is warm and dry. Capillary refill takes less than 2 seconds. He is not diaphoretic.   Scattered ecchymoses   Psychiatric: He has a normal mood and affect. His behavior is normal. Thought content normal.   Nursing note and vitals reviewed.      Significant Labs: All pertinent labs within the past 24 hours have been reviewed.    Significant Imaging: I have reviewed all pertinent imaging results/findings within the past 24 hours.

## 2019-03-16 NOTE — PLAN OF CARE
Problem: Occupational Therapy Goal  Goal: Occupational Therapy Goal  OT evaluation completed.  IDRIS Tapia  3/16/2019

## 2019-03-16 NOTE — ASSESSMENT & PLAN NOTE
NSTEMI with ANGELA = 4 with history of progressive angina as outpatient in the setting of ICM EF 30% and cirrhosis, currently without signs of fluid overload. Likely myocardial injury from significant underlying disease exacerbated by elevated HR due to new-onset AF with RVR.    - recommend initiation of ACS protocol with careful monitoring for bleeding  - agree with heparin drip (would start 8 hours after the 130mg lovenox was administered) with no bolus  - agree with brilinta load and 90mg BID  - continue ASA 81mg  - agree with statin, recommend 80mg if ok with hepatology  - continue metoprolol with target HR <80, can change to carvedilol if this is preferred from liver standpoint  - serial EKG, trend troponins to peak  - interventional cardiology consult for angiographic evaluation  - monitor carefully for bleeding  - recommend keeping patient on the dry side

## 2019-03-16 NOTE — CONSULTS
[facility]  Cardiology  Consult Note    Patient Name: Jian Arrieta  MRN: [id number]  Admission Date: 3/12/2019  Hospital Length of Stay: 1 days  Code Status: Full Code   Attending Provider: SELENA Castillo MD   Consulting Provider: Jake Escobar MD  Primary Care Physician: Samir Mahmood MD  Principal Problem:Ascites    Patient information was obtained from patient, past medical records and ER records.     Consults  Subjective:     Chief Complaint:  Chest pain  REASON FOR CONSULT: NSTEMI    HPI:   We are consulted on this 62yo male ?MARIE/HUGH cirrhosis, ICM EF 30% with history of PCI and CABG with unknown anatomy, HTN, HLD, DM, BERHANE, and alcohol use c/b by pancreatitis/pseudocyst. Hepatology is being consulted due to ongoing ascites.    He was initially admitted on 3/13/19 due to shortness of breath and lower extremity edema. On admission Na was 130 and he had an ELIEZER (Cr 1.6), CTA with no PE, but ground-glass attenuation of the lungs, large volume abdominal ascites, and persistent air and fluid collection along the body of the pancreas suggestive of pancreatic necrosis or pseudocyst. He was admitted for his hyponatremia, underwent trans-jugular liver biopsy yesterday, went into AF RVR with HR in 150s shortly thereafter (8pm) and converted by 10pm (which is when tele monitoring is available), he had associated crushing, sub-sternal chest pain that resolved with the decrease in HR. He has intermittent angina (progressive over the last 3 months), but this was the worst that he has had.     EKG shows rates of 140-156 at 8pm and 8:30pm. On review there is a rate-associated IVCD with possible lateral ST depressions, though the J-point is difficult to make out. The 8:30 EKG shows persistent AF, telemetry shows he converted at 10pm. After converting troponins have increased to 3 as of 2:30am    After HR control his chest pain resolved, currently he feels well and denies symptoms of chest pain, is  requesting to have his PRN nitro.    Past Medical History:   Diagnosis Date    Alcohol abuse     Anasarca 1/28/2019    Arthropathy associated with neurological disorder 9/2/2015    Atherosclerosis     Charcot foot due to diabetes mellitus     Chronic combined systolic and diastolic heart failure 01/29/2019 1-28-19 Left VentricleModerate decreased ejection fraction at 30%. Normal left ventricular cavity size. Normal wall thickness observed. Grade I (mild) left ventricular diastolic dysfunction consistent with impaired relaxation. Normal left atrial pressure. Moderate, global hypokinesis(see wall scoring diagram). Right VentricleNormal cavity size, wall thickness and ejection fraction. Wall motion n    Chronic pancreatitis 1/28/2019    Cirrhosis of liver with ascites 1/28/2019    Colon polyps     approx 5 yrs ago    Coronary artery disease due to calcified coronary lesion 05/08/2015    5 stents on ASA      Diabetic polyneuropathy associated with type 2 diabetes mellitus 9/2/2015    Diverticulosis 1/28/2019    DM type 2 with diabetic peripheral neuropathy 2/4/2019    Essential hypertension 1/28/2019    Former smoker 8/26/2015    Healed ulcer of left foot on examination 6/20/2017    Hydrocele     approx 1.5 yrs ago    Hypoalbuminemia 2/4/2019    Lymphedema of both lower extremities 1/29/2019    Mixed hyperlipidemia 5/8/2015    Morbid obesity with BMI of 50.0-59.9, adult 5/8/2015    Onychomycosis of multiple toenails with type 2 diabetes mellitus and peripheral neuropathy 6/20/2017    Other cirrhosis of liver     1-28-19 Liver has a cirrhotic morphology with no focal lesions.  Significant interval increase in ascites when compared to prior exam which may account for patient's abdominal distension.  Hypodense air-fluid collection along the body of the pancreas which is slightly smaller when compared to prior CT.  Findings may relate to pancreatic necrosis with pancreatic pseudocysts with infected  pseudocyst    Perianal cyst     approx 2 yrs ago    Pseudocyst of pancreas 1/28/2019 1-28-19 Liver has a cirrhotic morphology with no focal lesions.  Significant interval increase in ascites when compared to prior exam which may account for patient's abdominal distension.  Hypodense air-fluid collection along the body of the pancreas which is slightly smaller when compared to prior CT.  Findings may relate to pancreatic necrosis with pancreatic pseudocysts with infected pseudocyst    Skin cancer     skin cancer    Sleep apnea 8/26/2015    Status post bariatric surgery 1/11/2016    Type 2 diabetes mellitus, with long-term current use of insulin 5/8/2015       Past Surgical History:   Procedure Laterality Date    ANGIOPLASTY      total x5 stents    COLONOSCOPY N/A 10/6/2015    Performed by Shekhar Richards MD at [facility] ENDO (2ND FLR)    CORONARY ARTERY BYPASS GRAFT  2017    x3    CYST REMOVAL      GASTRECTOMY      GASTRECTOMY-SLEEVE-LAPAROSCOPIC - 15739 N/A 12/22/2015    Performed by Micheal Villavicencio Jr., MD at [facility] OR 2ND FLR    KNEE ARTHROSCOPY      perianal surgery      perianal cyst removed       Review of patient's allergies indicates:  No Known Allergies    No current facility-administered medications on file prior to encounter.      Current Outpatient Medications on File Prior to Encounter   Medication Sig    aspirin 325 MG tablet Take 81 mg by mouth once daily.    atorvastatin (LIPITOR) 40 MG tablet Take 1 tablet (40 mg total) by mouth once daily.    cyanocobalamin, vitamin B-12, 500 mcg Subl Place 1 tablet under the tongue every Monday.     furosemide (LASIX) 40 MG tablet Take 1 tablet (40 mg total) by mouth once daily.    insulin aspart U-100 (NOVOLOG) 100 unit/mL injection Inject 4 Units into the skin 3 (three) times daily before meals.    insulin detemir (LEVEMIR FLEXPEN SUBQ) Inject 12 Units into the skin once daily.     lipase-protease-amylase (CREON) 36,000-114,000- 180,000 unit CpDR  Take 1 capsule by mouth 3 (three) times daily.    metoprolol succinate (TOPROL-XL) 100 MG 24 hr tablet Take 1 tablet (100 mg total) by mouth once daily.    omeprazole (PRILOSEC) 40 MG capsule Take 40 mg by mouth once daily.    ONETOUCH ULTRA TEST Strp 4 (four) times daily. Use to test blood sugar four times a day.    ramipril (ALTACE) 2.5 MG capsule Take 1 capsule (2.5 mg total) by mouth once daily.    spironolactone (ALDACTONE) 100 MG tablet Take 1 tablet (100 mg total) by mouth once daily.    tamsulosin (FLOMAX) 0.4 mg Cap Take 1 capsule by mouth once daily. Pt has not started taking as of 19.    vitamin D (VITAMIN D3) 1000 units Tab Take 1 tablet (1,000 Units total) by mouth once daily.     Family History     Problem Relation (Age of Onset)    Cancer Mother, Father, Paternal Grandfather, Brother    Diabetes Maternal Grandmother    Heart disease Father    No Known Problems Paternal Grandmother    Obesity Sister    Parkinsonism Brother    Stroke Maternal Grandfather        Tobacco Use    Smoking status: Former Smoker     Packs/day: 2.00     Years: 30.00     Pack years: 60.00     Types: Cigarettes     Last attempt to quit: 2005     Years since quittin.1    Smokeless tobacco: Never Used   Substance and Sexual Activity    Alcohol use: No     Comment: started ~, reports 1 shot daily, max 3 shots daily, vague about alcohol consumption. Last drink 2018    Drug use: No    Sexual activity: Not on file     Review of Systems   Constitution: Negative for chills, fever, weakness and weight gain.   HENT: Negative for congestion.    Eyes: Negative for visual disturbance.   Cardiovascular: Positive for chest pain. Negative for claudication, dyspnea on exertion, leg swelling, orthopnea, palpitations and syncope.   Respiratory: Positive for shortness of breath. Negative for cough and snoring.    Hematologic/Lymphatic: Does not bruise/bleed easily.   Skin: Negative for rash.   Musculoskeletal:  Negative for muscle cramps and myalgias.   Gastrointestinal: Negative for bloating, abdominal pain, constipation, diarrhea and melena.   Genitourinary: Negative for bladder incontinence.   Neurological: Negative for excessive daytime sleepiness and focal weakness.   Psychiatric/Behavioral: Negative for depression and suicidal ideas.     Objective:     Vital Signs (Most Recent):  Temp: 97.2 °F (36.2 °C) (03/16/19 0500)  Pulse: 97 (03/16/19 0500)  Resp: 18 (03/16/19 0500)  BP: 109/68 (03/16/19 0500)  SpO2: 96 % (03/16/19 0500) Vital Signs (24h Range):  Temp:  [96.1 °F (35.6 °C)-99.8 °F (37.7 °C)] 97.2 °F (36.2 °C)  Pulse:  [] 97  Resp:  [12-20] 18  SpO2:  [90 %-100 %] 96 %  BP: ()/(48-82) 109/68     Weight: 134.6 kg (296 lb 13.6 oz)  Body mass index is 46.49 kg/m².    SpO2: 96 %  O2 Device (Oxygen Therapy): room air      Intake/Output Summary (Last 24 hours) at 3/16/2019 0634  Last data filed at 3/16/2019 0026  Gross per 24 hour   Intake 1050.17 ml   Output 7050 ml   Net -5999.83 ml       Lines/Drains/Airways     Peripherally Inserted Central Catheter Line                 PICC Double Lumen 03/15/19 1733 right brachial less than 1 day          Peripheral Intravenous Line                 Peripheral IV - Single Lumen 03/13/19 0401 Left Forearm 3 days         Peripheral IV - Single Lumen 03/15/19 1340 Anterior;Distal;Right Upper Arm less than 1 day                Physical Exam   Constitutional: He is oriented to person, place, and time. He appears well-developed and well-nourished. No distress.   Obese   HENT:   Head: Normocephalic and atraumatic.   Mouth/Throat: Oropharynx is clear and moist.   Eyes: Conjunctivae and EOM are normal. Pupils are equal, round, and reactive to light. No scleral icterus.   Neck: Normal range of motion. Neck supple. No JVD present.   Cardiovascular: Normal rate, regular rhythm, normal heart sounds and intact distal pulses.   No murmur heard.  Pulses:       Radial pulses are 2+ on  the right side, and 2+ on the left side.   Pulmonary/Chest: Effort normal and breath sounds normal. No respiratory distress.   Symmetrical expansion   Abdominal: Soft. Bowel sounds are normal. He exhibits distension. There is no hepatosplenomegaly. There is no tenderness. There is no rebound.   Musculoskeletal: Normal range of motion. He exhibits edema.   Neurological: He is alert and oriented to person, place, and time. No cranial nerve deficit.   Skin: Skin is warm and dry. No rash noted. He is not diaphoretic.   Psychiatric: He has a normal mood and affect. Judgment and thought content normal.       Significant Labs:   ABG: No results for input(s): PH, PCO2, HCO3, POCSATURATED, BE in the last 48 hours., CMP   Recent Labs   Lab 03/15/19  0448 03/15/19  2019 03/16/19  0230   * 136 134*   K 3.7 4.3 3.8    104 105   CO2 24 22* 23   * 185* 308*   BUN 26* 22 22   CREATININE 1.1 1.3 1.2   CALCIUM 7.7* 8.6* 7.6*   PROT 4.6* 5.8* 4.4*   ALBUMIN 2.4* 3.7 2.7*   BILITOT 0.5 1.0 0.7   ALKPHOS 86 87 68   AST 12 14 21   ALT 10 11 9*   ANIONGAP 4* 10 6*   ESTGFRAFRICA >60.0 >60.0 >60.0   EGFRNONAA >60.0 57.7* >60.0   , CBC   Recent Labs   Lab 03/15/19  0448 03/15/19  2019 03/16/19  0230   WBC 4.00 4.83 5.46   HGB 7.4* 9.4* 7.8*   HCT 23.4* 29.6* 24.4*    185 202   , INR   Recent Labs   Lab 03/15/19  2019 03/16/19  0230   INR 1.2  1.2 1.4*   , Lipid Panel No results for input(s): CHOL, HDL, LDLCALC, TRIG, CHOLHDL in the last 48 hours. and Troponin   Recent Labs   Lab 03/15/19  2019 03/16/19  0222   TROPONINI 0.014 3.011*       Significant Imaging:     EKG: see above    Echo: 01/28  Left Ventricle Moderate decreased ejection fraction at 30%. Normal left ventricular cavity size. Normal wall thickness observed. Grade I (mild) left ventricular diastolic dysfunction consistent with impaired relaxation. Normal left atrial pressure. Moderate, global hypokinesis(see wall scoring diagram).   Right Ventricle  Normal cavity size, wall thickness and ejection fraction. Wall motion normal.   Left Atrium Normal atrial size. .   Right Atrium Normal atrial size.   Aortic Valve The valve is trileaflet. Aortic valve sclerosis is mild. No stenosis. No regurgitation. Mobility is normal   Mitral Valve Normal valve structure. No stenosis. No regurgitation. Normal leaflet mobility.   Tricuspid Valve Tricuspid valve not well visualized. Trace regurgitation.   Pulmonic Valve The pulmonic valve was not well visualized.   IVC/SVC Inferior vena cava is not well visualized.   Ascending Aorta The aortic root and ascending aorta are normal in size.   Pericardium No pericardial effusion.         Assessment and Plan:     Paroxysmal atrial fibrillation    Paroxysmal AF, new onset with [id number] = 4    - continue heparin for now, once stable will need transition to full AC with coumadin (unless there is a DOAC that hepatology is ok with)  - HR control as per NSTEMI problem  - recommend amiodarone to keep patient out of AF, would clear this with hepatology     NSTEMI (non-ST elevated myocardial infarction)    NSTEMI with ANGELA = 4 with history of progressive angina as outpatient in the setting of ICM EF 30% and cirrhosis, currently without signs of fluid overload. Likely myocardial injury from significant underlying disease exacerbated by elevated HR due to new-onset AF with RVR.    - recommend initiation of ACS protocol with careful monitoring for bleeding  - agree with heparin drip (would start 8 hours after the 130mg lovenox was administered) with no bolus  - agree with brilinta load and 90mg BID  - continue ASA 81mg  - agree with statin, recommend 80mg if ok with hepatology  - continue metoprolol with target HR <80, can change to carvedilol if this is preferred from liver standpoint  - serial EKG, trend troponins to peak  - interventional cardiology consult for angiographic evaluation  - monitor carefully for bleeding  - recommend keeping patient  on the dry side         VTE Risk Mitigation (From admission, onward)        Ordered     heparin 25,000 units in dextrose 5% 250 mL (100 units/mL) infusion LOW INTENSITY nomogram - [facility]  Continuous      03/16/19 0657     heparin 25,000 units in dextrose 5% (100 units/ml) IV bolus from bag - ADDITIONAL PRN BOLUS - 60 units/kg (max bolus 4000 units)  As needed (PRN)      03/16/19 0355     heparin 25,000 units in dextrose 5% (100 units/ml) IV bolus from bag - ADDITIONAL PRN BOLUS - 30 units/kg (max bolus 4000 units)  As needed (PRN)      03/16/19 0355     IP VTE HIGH RISK PATIENT  Once      03/13/19 1409     Place sequential compression device  Until discontinued      03/13/19 1409     Place BRANNON hose  Until discontinued      03/13/19 0310     Place sequential compression device  Until discontinued      03/13/19 0310          Thank you for your consult. I will follow-up with patient. Please contact us if you have any additional questions.    Jake Escobar MD  Cardiology   [facility]

## 2019-03-16 NOTE — ASSESSMENT & PLAN NOTE
Paroxysmal AF, new onset with [id number] = 4    - continue heparin for now, once stable will need transition to full AC with coumadin (unless there is a DOAC that hepatology is ok with)  - HR control as per NSTEMI problem  - recommend amiodarone to keep patient out of AF, would clear this with hepatology

## 2019-03-16 NOTE — SUBJECTIVE & OBJECTIVE
Past Medical History:   Diagnosis Date    Alcohol abuse     Anasarca 1/28/2019    Arthropathy associated with neurological disorder 9/2/2015    Atherosclerosis     Charcot foot due to diabetes mellitus     Chronic combined systolic and diastolic heart failure 01/29/2019 1-28-19 Left VentricleModerate decreased ejection fraction at 30%. Normal left ventricular cavity size. Normal wall thickness observed. Grade I (mild) left ventricular diastolic dysfunction consistent with impaired relaxation. Normal left atrial pressure. Moderate, global hypokinesis(see wall scoring diagram). Right VentricleNormal cavity size, wall thickness and ejection fraction. Wall motion n    Chronic pancreatitis 1/28/2019    Cirrhosis of liver with ascites 1/28/2019    Colon polyps     approx 5 yrs ago    Coronary artery disease due to calcified coronary lesion 05/08/2015    5 stents on ASA      Diabetic polyneuropathy associated with type 2 diabetes mellitus 9/2/2015    Diverticulosis 1/28/2019    DM type 2 with diabetic peripheral neuropathy 2/4/2019    Essential hypertension 1/28/2019    Former smoker 8/26/2015    Healed ulcer of left foot on examination 6/20/2017    Hydrocele     approx 1.5 yrs ago    Hypoalbuminemia 2/4/2019    Lymphedema of both lower extremities 1/29/2019    Mixed hyperlipidemia 5/8/2015    Morbid obesity with BMI of 50.0-59.9, adult 5/8/2015    Onychomycosis of multiple toenails with type 2 diabetes mellitus and peripheral neuropathy 6/20/2017    Other cirrhosis of liver     1-28-19 Liver has a cirrhotic morphology with no focal lesions.  Significant interval increase in ascites when compared to prior exam which may account for patient's abdominal distension.  Hypodense air-fluid collection along the body of the pancreas which is slightly smaller when compared to prior CT.  Findings may relate to pancreatic necrosis with pancreatic pseudocysts with infected pseudocyst    Perianal cyst      approx 2 yrs ago    Pseudocyst of pancreas 1/28/2019 1-28-19 Liver has a cirrhotic morphology with no focal lesions.  Significant interval increase in ascites when compared to prior exam which may account for patient's abdominal distension.  Hypodense air-fluid collection along the body of the pancreas which is slightly smaller when compared to prior CT.  Findings may relate to pancreatic necrosis with pancreatic pseudocysts with infected pseudocyst    Skin cancer     skin cancer    Sleep apnea 8/26/2015    Status post bariatric surgery 1/11/2016    Type 2 diabetes mellitus, with long-term current use of insulin 5/8/2015       Past Surgical History:   Procedure Laterality Date    ANGIOPLASTY      total x5 stents    COLONOSCOPY N/A 10/6/2015    Performed by Shekhar Richards MD at [facility] (2ND FLR)    CORONARY ARTERY BYPASS GRAFT  2017    x3    CYST REMOVAL      GASTRECTOMY      GASTRECTOMY-SLEEVE-LAPAROSCOPIC - 92508 N/A 12/22/2015    Performed by Micheal Villavicencio Jr., MD at [facility] OR 2ND FLR    KNEE ARTHROSCOPY      perianal surgery      perianal cyst removed       Review of patient's allergies indicates:  No Known Allergies    No current facility-administered medications on file prior to encounter.      Current Outpatient Medications on File Prior to Encounter   Medication Sig    aspirin 325 MG tablet Take 81 mg by mouth once daily.    atorvastatin (LIPITOR) 40 MG tablet Take 1 tablet (40 mg total) by mouth once daily.    cyanocobalamin, vitamin B-12, 500 mcg Subl Place 1 tablet under the tongue every Monday.     furosemide (LASIX) 40 MG tablet Take 1 tablet (40 mg total) by mouth once daily.    insulin aspart U-100 (NOVOLOG) 100 unit/mL injection Inject 4 Units into the skin 3 (three) times daily before meals.    insulin detemir (LEVEMIR FLEXPEN SUBQ) Inject 12 Units into the skin once daily.     lipase-protease-amylase (CREON) 36,000-114,000- 180,000 unit CpDR Take 1 capsule by mouth 3 (three)  times daily.    metoprolol succinate (TOPROL-XL) 100 MG 24 hr tablet Take 1 tablet (100 mg total) by mouth once daily.    omeprazole (PRILOSEC) 40 MG capsule Take 40 mg by mouth once daily.    ONETOUCH ULTRA TEST Strp 4 (four) times daily. Use to test blood sugar four times a day.    ramipril (ALTACE) 2.5 MG capsule Take 1 capsule (2.5 mg total) by mouth once daily.    spironolactone (ALDACTONE) 100 MG tablet Take 1 tablet (100 mg total) by mouth once daily.    tamsulosin (FLOMAX) 0.4 mg Cap Take 1 capsule by mouth once daily. Pt has not started taking as of 19.    vitamin D (VITAMIN D3) 1000 units Tab Take 1 tablet (1,000 Units total) by mouth once daily.     Family History     Problem Relation (Age of Onset)    Cancer Mother, Father, Paternal Grandfather, Brother    Diabetes Maternal Grandmother    Heart disease Father    No Known Problems Paternal Grandmother    Obesity Sister    Parkinsonism Brother    Stroke Maternal Grandfather        Tobacco Use    Smoking status: Former Smoker     Packs/day: 2.00     Years: 30.00     Pack years: 60.00     Types: Cigarettes     Last attempt to quit: 2005     Years since quittin.1    Smokeless tobacco: Never Used   Substance and Sexual Activity    Alcohol use: No     Comment: started ~, reports 1 shot daily, max 3 shots daily, vague about alcohol consumption. Last drink 2018    Drug use: No    Sexual activity: Not on file     Review of Systems   Constitution: Negative for chills, fever, weakness and weight gain.   HENT: Negative for congestion.    Eyes: Negative for visual disturbance.   Cardiovascular: Positive for chest pain. Negative for claudication, dyspnea on exertion, leg swelling, orthopnea, palpitations and syncope.   Respiratory: Positive for shortness of breath. Negative for cough and snoring.    Hematologic/Lymphatic: Does not bruise/bleed easily.   Skin: Negative for rash.   Musculoskeletal: Negative for muscle cramps and myalgias.    Gastrointestinal: Negative for bloating, abdominal pain, constipation, diarrhea and melena.   Genitourinary: Negative for bladder incontinence.   Neurological: Negative for excessive daytime sleepiness and focal weakness.   Psychiatric/Behavioral: Negative for depression and suicidal ideas.     Objective:     Vital Signs (Most Recent):  Temp: 97.2 °F (36.2 °C) (03/16/19 0500)  Pulse: 97 (03/16/19 0500)  Resp: 18 (03/16/19 0500)  BP: 109/68 (03/16/19 0500)  SpO2: 96 % (03/16/19 0500) Vital Signs (24h Range):  Temp:  [96.1 °F (35.6 °C)-99.8 °F (37.7 °C)] 97.2 °F (36.2 °C)  Pulse:  [] 97  Resp:  [12-20] 18  SpO2:  [90 %-100 %] 96 %  BP: ()/(48-82) 109/68     Weight: 134.6 kg (296 lb 13.6 oz)  Body mass index is 46.49 kg/m².    SpO2: 96 %  O2 Device (Oxygen Therapy): room air      Intake/Output Summary (Last 24 hours) at 3/16/2019 0634  Last data filed at 3/16/2019 0026  Gross per 24 hour   Intake 1050.17 ml   Output 7050 ml   Net -5999.83 ml       Lines/Drains/Airways     Peripherally Inserted Central Catheter Line                 PICC Double Lumen 03/15/19 1733 right brachial less than 1 day          Peripheral Intravenous Line                 Peripheral IV - Single Lumen 03/13/19 0401 Left Forearm 3 days         Peripheral IV - Single Lumen 03/15/19 1340 Anterior;Distal;Right Upper Arm less than 1 day                Physical Exam   Constitutional: He is oriented to person, place, and time. He appears well-developed and well-nourished. No distress.   Obese   HENT:   Head: Normocephalic and atraumatic.   Mouth/Throat: Oropharynx is clear and moist.   Eyes: Conjunctivae and EOM are normal. Pupils are equal, round, and reactive to light. No scleral icterus.   Neck: Normal range of motion. Neck supple. No JVD present.   Cardiovascular: Normal rate, regular rhythm, normal heart sounds and intact distal pulses.   No murmur heard.  Pulses:       Radial pulses are 2+ on the right side, and 2+ on the left side.    Pulmonary/Chest: Effort normal and breath sounds normal. No respiratory distress.   Symmetrical expansion   Abdominal: Soft. Bowel sounds are normal. He exhibits distension. There is no hepatosplenomegaly. There is no tenderness. There is no rebound.   Musculoskeletal: Normal range of motion. He exhibits edema.   Neurological: He is alert and oriented to person, place, and time. No cranial nerve deficit.   Skin: Skin is warm and dry. No rash noted. He is not diaphoretic.   Psychiatric: He has a normal mood and affect. Judgment and thought content normal.       Significant Labs:   ABG: No results for input(s): PH, PCO2, HCO3, POCSATURATED, BE in the last 48 hours., CMP   Recent Labs   Lab 03/15/19  0448 03/15/19  2019 03/16/19  0230   * 136 134*   K 3.7 4.3 3.8    104 105   CO2 24 22* 23   * 185* 308*   BUN 26* 22 22   CREATININE 1.1 1.3 1.2   CALCIUM 7.7* 8.6* 7.6*   PROT 4.6* 5.8* 4.4*   ALBUMIN 2.4* 3.7 2.7*   BILITOT 0.5 1.0 0.7   ALKPHOS 86 87 68   AST 12 14 21   ALT 10 11 9*   ANIONGAP 4* 10 6*   ESTGFRAFRICA >60.0 >60.0 >60.0   EGFRNONAA >60.0 57.7* >60.0   , CBC   Recent Labs   Lab 03/15/19  0448 03/15/19  2019 03/16/19  0230   WBC 4.00 4.83 5.46   HGB 7.4* 9.4* 7.8*   HCT 23.4* 29.6* 24.4*    185 202   , INR   Recent Labs   Lab 03/15/19  2019 03/16/19  0230   INR 1.2  1.2 1.4*   , Lipid Panel No results for input(s): CHOL, HDL, LDLCALC, TRIG, CHOLHDL in the last 48 hours. and Troponin   Recent Labs   Lab 03/15/19  2019 03/16/19  0222   TROPONINI 0.014 3.011*       Significant Imaging:     EKG: see above    Echo: 01/28  Left Ventricle Moderate decreased ejection fraction at 30%. Normal left ventricular cavity size. Normal wall thickness observed. Grade I (mild) left ventricular diastolic dysfunction consistent with impaired relaxation. Normal left atrial pressure. Moderate, global hypokinesis(see wall scoring diagram).   Right Ventricle Normal cavity size, wall thickness and  ejection fraction. Wall motion normal.   Left Atrium Normal atrial size. .   Right Atrium Normal atrial size.   Aortic Valve The valve is trileaflet. Aortic valve sclerosis is mild. No stenosis. No regurgitation. Mobility is normal   Mitral Valve Normal valve structure. No stenosis. No regurgitation. Normal leaflet mobility.   Tricuspid Valve Tricuspid valve not well visualized. Trace regurgitation.   Pulmonic Valve The pulmonic valve was not well visualized.   IVC/SVC Inferior vena cava is not well visualized.   Ascending Aorta The aortic root and ascending aorta are normal in size.   Pericardium No pericardial effusion.

## 2019-03-16 NOTE — ASSESSMENT & PLAN NOTE
After IR procedure, patient went into afib with RVR and began having crushing 8/10 chest pain with nausea and shortness of breath. His symptoms improved with rate control strategy.  - EKG T wave depressions in inferior and lateral leads concerning for ischemia  - initial troponin 0.014, second 3.011, trended to peak   - Cardiology consulted, believe demand ischemia s/t A fib; continue heparin   - start ACS protocol (heparin gtt, , Brilinta load, with daily dose)  - monitor for signs of bleeding given anemia of unknown etiology (chronic disease?)   - CT negative for bleed  - demand ischemia in setting of anemia and known CAD (stents and CABG) likely while in RVR

## 2019-03-16 NOTE — CARE UPDATE
Patient already on lovenox, weight based.  Will hold heparin bolus and start continuous infusion at 2 pm, per cards recs.

## 2019-03-16 NOTE — CARE UPDATE
Rapid Response Nurse Note     Rapid Response Metrics:     Admit Date: 3/12/2019  LOS: 0  Code Status: Full Code   Date of Consult: 03/15/2019  : 1955  Age: 63 y.o.  Weight:   Wt Readings from Last 1 Encounters:   19 134.6 kg (296 lb 13.6 oz)     Sex: male  Race: White   Bed: OBS 3078/OBS 3078A:   MRN: [id number]  Time Rapid Response Team page Received:   Time Rapid Response Team at Bedside:   Time Rapid Response Team left Bedside: Currently at bedside  Was the patient discharged from an ICU this admission?   no  Was the patient discharged from a PACU within last 24 hours?  no  Did the patient receive conscious sedation/general anesthesia within last 24 hours?  yes  Was the patient in the ED within the past 24 hours?  no  Was the patient started on NIPPV within the past 24 hours?  no  Did this progress into an ARC or CPA:  no  Attending Physician: SELENA Castillo MD  Primary Service: [facility]  Consult Requested By: SELENA Castillo MD   Rapid Response Indication(s): A Fib W/ RVR -160    SITUATION:     Reason for Call:   Called to evaluate the patient for Dysrythmia    BACKGROUND:     Why is the patient in the hospital?: Paracentesis and Liver biopsy   What changed?: Post procedure patient experienced new onset chest pain and HR of 156-160 and irregular rhythm.  Rapid Response RN and House supervisor at bedside as well as IR RN X2.  STAT EKG initiated and labs drawn.  IMF primary team paged.  Critical care team also consulted for stat treatment of new onset condition given patient's post procedure status.  Critical care at bedside, STAT EKG shows afib w/ RVR but artifact present.  Patient remained hemodynamically stable throughout arrhythmia with oxygen saturation of 94-98% room air.  Repeat EKG ordered.  Shortly after critical care arrival, patient experienced one occurance of N/V for which 8mg IV zofran was given.  After repeat EKG critical care and primary team Marcelino WEAVER made  decision to attempt rhythm conversion with 5mg lopressor.  Patients HR returned to his baseline of sinus tach in the 120's at this time.  Blood pressure tolerated well. Systolic's remained 100-120.  Decision made by Dr. Nguyen to admit patient to inpatient med surg floor at this time to ensure patient's safety and no other arrhythmias occur over night.       ASSESSMENT:     What did you find: see above    RECOMMENDATIONS:     We recommend: pt to be transferred to room 7097 for higher level of care.  HR goals <130.  Notify team if patient's cardiac rhythm changes.      FOLLOW-UP/CONTINGENCY PLAN:     Patient needs a second visit at : AM Follow Up    Call the rapid response Nurse at x [phone] for additional questions or concerns.      PHYSICIAN ESCALATION:     Orders received and case discussed with Wendy WEAVER     Disposition: Tx to [facility], bed 7097.

## 2019-03-16 NOTE — HPI
We are consulted on this 62yo male ?MARIE/HUGH cirrhosis, ICM EF 30% with history of PCI and CABG with unknown anatomy, HTN, HLD, DM, BERHANE, and alcohol use c/b by pancreatitis/pseudocyst. Hepatology is being consulted due to ongoing ascites.    He was initially admitted on 3/13/19 due to shortness of breath and lower extremity edema. On admission Na was 130 and he had an ELIEZER (Cr 1.6), CTA with no PE, but ground-glass attenuation of the lungs, large volume abdominal ascites, and persistent air and fluid collection along the body of the pancreas suggestive of pancreatic necrosis or pseudocyst. He was admitted for his hyponatremia, underwent trans-jugular liver biopsy yesterday, went into AF RVR with HR in 150s shortly thereafter (8pm) and converted by 10pm (which is when tele monitoring is available), he had associated crushing, sub-sternal chest pain that resolved with the decrease in HR. He has intermittent angina (progressive over the last 3 months), but this was the worst that he has had.     EKG shows rates of 140-156 at 8pm and 8:30pm. On review there is a rate-associated IVCD with possible lateral ST depressions, though the J-point is difficult to make out. The 8:30 EKG shows persistent AF, telemetry shows he converted at 10pm. After converting troponins have increased to 3 as of 2:30am    After HR control his chest pain resolved, currently he feels well and denies symptoms of chest pain, is requesting to have his PRN nitro.

## 2019-03-16 NOTE — PT/OT/SLP EVAL
"Occupational Therapy   Evaluation and Discharge Note    Name: Jian Arrieta  MRN: [id number]  Admitting Diagnosis:  Ascites      Recommendations:     Discharge Recommendations: home health OT (for home safety eval for living environment and for safety during ADLs within home environment)   Discharge Equipment Recommendations:   none  Barriers to discharge:  None    Assessment:     Jian Arrieta is a 63 y.o. male with a medical diagnosis of Ascites. At this time, patient is functioning at their prior level of function and does not require further acute OT services in acute care setting.     Plan:     During this hospitalization, patient does not require further acute OT services.  Please re-consult if situation changes.    · Plan of Care Reviewed with: patient    Subjective     Patient:  "They did a liver biopsy yesterday and have had no food for a long time.  At the end of the procedure I got sick."    Occupational Profile:  Patient resides in [place] with wife in one story home with 3-4 steps to enter, rail available.  Patient is left handed.  PTA patient independent with ADLs (does not wear socks/shoes, only shorts and slippers). DME:  Cane, rolling walker, shower chair, raised toilet.  Hobbies:  Motorcycles, music.  Retired: fire dept.     Pain/Comfort:  · Pain Rating 1: 0/10  · Pain Rating Post-Intervention 1: 0/10    Patients cultural, spiritual, Denominational conflicts given the current situation: no    Objective:     Communicated with: Nurse prior to session.  Patient found supine with peripheral IV, PICC line, bed alarm upon OT entry to room.    General Precautions: Standard, aspiration, fall   Orthopedic Precautions:N/A   Braces: N/A     Occupational Performance:    Bed Mobility:    · Patient completed Rolling/Turning to Left with  independence  · Patient completed Rolling/Turning to Right with independence  · Patient completed Scooting/Bridging with independence  · Patient completed Supine to Sit with " independence  · Patient completed Sit to Supine with independence    Functional Mobility/Transfers:  · Patient completed Sit <> Stand Transfer with modified independence  with  no assistive device   · Patient completed Bed <> Chair Transfer using Stand Pivot technique with modified independence with no assistive device    Activities of Daily Living:  · Grooming: modified independence while standing  · Upper Body Dressing: modified independence while seated EOB  · Lower Body Dressing: supervision while seated EOB  · Toileting: modified independence with use of 3 in 1 commode    Cognitive/Visual Perceptual:  Cognitive/Psychosocial Skills:     -       Oriented to: Person, Place, Time and Situation   -       Follows Commands/attention:Follows one-step commands  -       Communication: clear/fluent  -       Memory: No Deficits noted  -       Safety awareness/insight to disability: intact   -       Mood/Affect/Coping skills/emotional control: Appropriate to situation  Visual/Perceptual:      -Intact      Physical Exam:  Postural examination/scapula alignment:    -       Rounded shoulders  Skin integrity: Visible skin intact  Edema:  lymphedema bilateral LEs (wrapped)  Sensation:    -       Intact  Upper Extremity Range of Motion:     -       Right Upper Extremity: WNL  -       Left Upper Extremity: WNL  Upper Extremity Strength:    -       Right Upper Extremity: WFL  -       Left Upper Extremity: WFL    AMPAC 6 Click ADL:  AMPAC Total Score: 23    Treatment & Education:  Patient education provided on role of OT and need for no further OT while in acute care.  Patient education provided on the importance of OOB activity and mobility and recommendation for HH OT eval to assess for home safety and ADL activity within home environment.  Continued education, patient/ family training recommended.  Patient's functional status and disposition recommendation discussed with MD.  OT asked if there were any other questions; patient  had no further questions.   Education:    Patient left supine with all lines intact and call button in reach    GOALS:   Multidisciplinary Problems     Occupational Therapy Goals     Not on file          Multidisciplinary Problems (Resolved)        Problem: Occupational Therapy Goal    Goal Priority Disciplines Outcome Interventions   Occupational Therapy Goal   (Resolved)     OT, PT/OT Outcome(s) achieved                    History:     Past Medical History:   Diagnosis Date    Alcohol abuse     Anasarca 1/28/2019    Arthropathy associated with neurological disorder 9/2/2015    Atherosclerosis     Charcot foot due to diabetes mellitus     Chronic combined systolic and diastolic heart failure 01/29/2019 1-28-19 Left VentricleModerate decreased ejection fraction at 30%. Normal left ventricular cavity size. Normal wall thickness observed. Grade I (mild) left ventricular diastolic dysfunction consistent with impaired relaxation. Normal left atrial pressure. Moderate, global hypokinesis(see wall scoring diagram). Right VentricleNormal cavity size, wall thickness and ejection fraction. Wall motion n    Chronic pancreatitis 1/28/2019    Cirrhosis of liver with ascites 1/28/2019    Colon polyps     approx 5 yrs ago    Coronary artery disease due to calcified coronary lesion 05/08/2015    5 stents on ASA      Diabetic polyneuropathy associated with type 2 diabetes mellitus 9/2/2015    Diverticulosis 1/28/2019    DM type 2 with diabetic peripheral neuropathy 2/4/2019    Essential hypertension 1/28/2019    Former smoker 8/26/2015    Healed ulcer of left foot on examination 6/20/2017    Hydrocele     approx 1.5 yrs ago    Hypoalbuminemia 2/4/2019    Lymphedema of both lower extremities 1/29/2019    Mixed hyperlipidemia 5/8/2015    Morbid obesity with BMI of 50.0-59.9, adult 5/8/2015    Onychomycosis of multiple toenails with type 2 diabetes mellitus and peripheral neuropathy 6/20/2017    Other  cirrhosis of liver     1-28-19 Liver has a cirrhotic morphology with no focal lesions.  Significant interval increase in ascites when compared to prior exam which may account for patient's abdominal distension.  Hypodense air-fluid collection along the body of the pancreas which is slightly smaller when compared to prior CT.  Findings may relate to pancreatic necrosis with pancreatic pseudocysts with infected pseudocyst    Perianal cyst     approx 2 yrs ago    Pseudocyst of pancreas 1/28/2019 1-28-19 Liver has a cirrhotic morphology with no focal lesions.  Significant interval increase in ascites when compared to prior exam which may account for patient's abdominal distension.  Hypodense air-fluid collection along the body of the pancreas which is slightly smaller when compared to prior CT.  Findings may relate to pancreatic necrosis with pancreatic pseudocysts with infected pseudocyst    Skin cancer     skin cancer    Sleep apnea 8/26/2015    Status post bariatric surgery 1/11/2016    Type 2 diabetes mellitus, with long-term current use of insulin 5/8/2015       Past Surgical History:   Procedure Laterality Date    ANGIOPLASTY      total x5 stents    COLONOSCOPY N/A 10/6/2015    Performed by Shekhar Richards MD at [facility] END[facility] (2ND FLR)    CORONARY ARTERY BYPASS GRAFT  2017    x3    CYST REMOVAL      GASTRECTOMY      GASTRECTOMY-SLEEVE-LAPAROSCOPIC - 83475 N/A 12/22/2015    Performed by Micheal Villavicencio Jr., MD at [facility] OR 2ND FLR    KNEE ARTHROSCOPY      perianal surgery      perianal cyst removed       Time Tracking:     OT Date of Treatment: 03/16/19  OT Start Time: 0620  OT Stop Time: 0644  OT Total Time (min): 24 min    Billable Minutes:Evaluation 14  Therapeutic Activity 10    IDRIS Tapia  3/16/2019

## 2019-03-17 LAB
ALBUMIN SERPL BCP-MCNC: 2.7 G/DL
ALP SERPL-CCNC: 65 U/L
ALT SERPL W/O P-5'-P-CCNC: 12 U/L
ANION GAP SERPL CALC-SCNC: 6 MMOL/L
APTT BLDCRRT: 55.6 SEC
APTT BLDCRRT: 62 SEC
APTT BLDCRRT: >150 SEC
AST SERPL-CCNC: 32 U/L
BACTERIA BLD CULT: NORMAL
BASOPHILS # BLD AUTO: 0.03 K/UL
BASOPHILS NFR BLD: 0.7 %
BILIRUB SERPL-MCNC: 0.5 MG/DL
BUN SERPL-MCNC: 21 MG/DL
CALCIUM SERPL-MCNC: 7.7 MG/DL
CHLORIDE SERPL-SCNC: 106 MMOL/L
CO2 SERPL-SCNC: 24 MMOL/L
CREAT SERPL-MCNC: 1.2 MG/DL
DIFFERENTIAL METHOD: ABNORMAL
EOSINOPHIL # BLD AUTO: 0.1 K/UL
EOSINOPHIL NFR BLD: 2 %
ERYTHROCYTE [DISTWIDTH] IN BLOOD BY AUTOMATED COUNT: 15.6 %
EST. GFR  (AFRICAN AMERICAN): >60 ML/MIN/1.73 M^2
EST. GFR  (NON AFRICAN AMERICAN): >60 ML/MIN/1.73 M^2
GLUCOSE SERPL-MCNC: 209 MG/DL
HCT VFR BLD AUTO: 25.9 %
HCT VFR BLD AUTO: 27 %
HGB BLD-MCNC: 8.7 G/DL
HGB BLD-MCNC: 8.7 G/DL
HGB BLD-MCNC: 8.9 G/DL
IMM GRANULOCYTES # BLD AUTO: 0.02 K/UL
IMM GRANULOCYTES NFR BLD AUTO: 0.4 %
INR PPP: 1.4
LYMPHOCYTES # BLD AUTO: 0.8 K/UL
LYMPHOCYTES NFR BLD: 17.5 %
MCH RBC QN AUTO: 28.5 PG
MCHC RBC AUTO-ENTMCNC: 33 G/DL
MCV RBC AUTO: 87 FL
MONOCYTES # BLD AUTO: 0.4 K/UL
MONOCYTES NFR BLD: 9.6 %
NEUTROPHILS # BLD AUTO: 3.2 K/UL
NEUTROPHILS NFR BLD: 69.8 %
NRBC BLD-RTO: 0 /100 WBC
PLATELET # BLD AUTO: 189 K/UL
PMV BLD AUTO: 9.7 FL
POCT GLUCOSE: 146 MG/DL (ref 70–110)
POCT GLUCOSE: 276 MG/DL (ref 70–110)
POCT GLUCOSE: 284 MG/DL (ref 70–110)
POCT GLUCOSE: 351 MG/DL (ref 70–110)
POTASSIUM SERPL-SCNC: 3.5 MMOL/L
PROT SERPL-MCNC: 4.1 G/DL
PROTHROMBIN TIME: 13.9 SEC
RBC # BLD AUTO: 3.12 M/UL
SODIUM SERPL-SCNC: 136 MMOL/L
WBC # BLD AUTO: 4.58 K/UL

## 2019-03-17 PROCEDURE — 99233 PR SUBSEQUENT HOSPITAL CARE,LEVL III: ICD-10-PCS | Mod: ,,, | Performed by: NURSE PRACTITIONER

## 2019-03-17 PROCEDURE — 63600175 PHARM REV CODE 636 W HCPCS: Mod: JG | Performed by: NURSE PRACTITIONER

## 2019-03-17 PROCEDURE — 99232 SBSQ HOSP IP/OBS MODERATE 35: CPT | Mod: 25,,, | Performed by: INTERNAL MEDICINE

## 2019-03-17 PROCEDURE — 25000003 PHARM REV CODE 250: Performed by: PHYSICIAN ASSISTANT

## 2019-03-17 PROCEDURE — 93005 ELECTROCARDIOGRAM TRACING: CPT

## 2019-03-17 PROCEDURE — 80053 COMPREHEN METABOLIC PANEL: CPT

## 2019-03-17 PROCEDURE — 85730 THROMBOPLASTIN TIME PARTIAL: CPT

## 2019-03-17 PROCEDURE — 85730 THROMBOPLASTIN TIME PARTIAL: CPT | Mod: 91

## 2019-03-17 PROCEDURE — S5571 INSULIN DISPOS PEN 3 ML: HCPCS | Performed by: PHYSICIAN ASSISTANT

## 2019-03-17 PROCEDURE — 63600175 PHARM REV CODE 636 W HCPCS: Performed by: PHYSICIAN ASSISTANT

## 2019-03-17 PROCEDURE — 93010 ELECTROCARDIOGRAM REPORT: CPT | Mod: ,,, | Performed by: INTERNAL MEDICINE

## 2019-03-17 PROCEDURE — 11000001 HC ACUTE MED/SURG PRIVATE ROOM

## 2019-03-17 PROCEDURE — P9047 ALBUMIN (HUMAN), 25%, 50ML: HCPCS | Mod: JG | Performed by: NURSE PRACTITIONER

## 2019-03-17 PROCEDURE — 85025 COMPLETE CBC W/AUTO DIFF WBC: CPT

## 2019-03-17 PROCEDURE — 99233 SBSQ HOSP IP/OBS HIGH 50: CPT | Mod: ,,, | Performed by: NURSE PRACTITIONER

## 2019-03-17 PROCEDURE — 85610 PROTHROMBIN TIME: CPT

## 2019-03-17 PROCEDURE — 25000003 PHARM REV CODE 250

## 2019-03-17 PROCEDURE — 93010 EKG 12-LEAD: ICD-10-PCS | Mod: ,,, | Performed by: INTERNAL MEDICINE

## 2019-03-17 PROCEDURE — 25000003 PHARM REV CODE 250: Performed by: NURSE PRACTITIONER

## 2019-03-17 PROCEDURE — 99232 PR SUBSEQUENT HOSPITAL CARE,LEVL II: ICD-10-PCS | Mod: 25,,, | Performed by: INTERNAL MEDICINE

## 2019-03-17 RX ORDER — METOPROLOL TARTRATE 25 MG/1
25 TABLET, FILM COATED ORAL 2 TIMES DAILY
Status: DISCONTINUED | OUTPATIENT
Start: 2019-03-17 | End: 2019-03-18

## 2019-03-17 RX ORDER — NAPROXEN SODIUM 220 MG/1
81 TABLET, FILM COATED ORAL DAILY
Status: DISCONTINUED | OUTPATIENT
Start: 2019-03-17 | End: 2019-03-20

## 2019-03-17 RX ADMIN — TICAGRELOR 90 MG: 90 TABLET ORAL at 09:03

## 2019-03-17 RX ADMIN — PANCRELIPASE 3 CAPSULE: 60000; 12000; 38000 CAPSULE, DELAYED RELEASE PELLETS ORAL at 05:03

## 2019-03-17 RX ADMIN — SPIRONOLACTONE 100 MG: 100 TABLET, FILM COATED ORAL at 09:03

## 2019-03-17 RX ADMIN — ALBUMIN HUMAN 25 G: 0.25 SOLUTION INTRAVENOUS at 05:03

## 2019-03-17 RX ADMIN — PANTOPRAZOLE SODIUM 40 MG: 40 TABLET, DELAYED RELEASE ORAL at 09:03

## 2019-03-17 RX ADMIN — FUROSEMIDE 40 MG: 40 TABLET ORAL at 09:03

## 2019-03-17 RX ADMIN — INSULIN DETEMIR 7 UNITS: 100 INJECTION, SOLUTION SUBCUTANEOUS at 12:03

## 2019-03-17 RX ADMIN — PANCRELIPASE 3 CAPSULE: 60000; 12000; 38000 CAPSULE, DELAYED RELEASE PELLETS ORAL at 12:03

## 2019-03-17 RX ADMIN — ALBUMIN HUMAN 25 G: 0.25 SOLUTION INTRAVENOUS at 12:03

## 2019-03-17 RX ADMIN — METOPROLOL TARTRATE 25 MG: 25 TABLET ORAL at 08:03

## 2019-03-17 RX ADMIN — HEPARIN SODIUM AND DEXTROSE 12 UNITS/KG/HR: 10000; 5 INJECTION INTRAVENOUS at 03:03

## 2019-03-17 RX ADMIN — PANCRELIPASE 3 CAPSULE: 60000; 12000; 38000 CAPSULE, DELAYED RELEASE PELLETS ORAL at 08:03

## 2019-03-17 RX ADMIN — ASPIRIN 81 MG CHEWABLE TABLET 81 MG: 81 TABLET CHEWABLE at 09:03

## 2019-03-17 RX ADMIN — ATORVASTATIN CALCIUM 40 MG: 20 TABLET, FILM COATED ORAL at 09:03

## 2019-03-17 RX ADMIN — POTASSIUM BICARBONATE 50 MEQ: 978 TABLET, EFFERVESCENT ORAL at 09:03

## 2019-03-17 RX ADMIN — INSULIN ASPART 6 UNITS: 100 INJECTION, SOLUTION INTRAVENOUS; SUBCUTANEOUS at 12:03

## 2019-03-17 RX ADMIN — INSULIN ASPART 6 UNITS: 100 INJECTION, SOLUTION INTRAVENOUS; SUBCUTANEOUS at 05:03

## 2019-03-17 RX ADMIN — INSULIN DETEMIR 7 UNITS: 100 INJECTION, SOLUTION SUBCUTANEOUS at 11:03

## 2019-03-17 RX ADMIN — ALBUMIN HUMAN 25 G: 0.25 SOLUTION INTRAVENOUS at 06:03

## 2019-03-17 NOTE — PROGRESS NOTES
[facility] Hospital Medicine  Progress Note    Patient Name: Jian Arrieta  MRN: [id number]  Patient Class: IP- Inpatient   Admission Date: 3/12/2019  Length of Stay: 2 days  Attending Physician: SELENA Castillo MD  Primary Care Provider: Samir Mahmood MD    [facility] Medicine Team: [facility] F Yessica Dc NP    Subjective:     Principal Problem:Ascites    HPI:  Patient is a 62yo male with a PMHx of CAD (s/p PCI with 5 stents) with subsequent CABG x 3v (2017 at ), HLD, DM2, BERHANE on CPAP, EtOh cirrhosis being admitted to observation for shortness of breath due to ascites. Patient reports worsening shortness of breath for the last 4 weeks with associated LE edema and belly distension. He reports having a therapeutic paracentesis 4 weeks ago which removed 5L of fluid.  His body swelling and SOB improved temporarily then started to worsen again. Now he is SOB with minimal exertion. Patient at his baseline is able to walk at his house from one end to the other. Now he is unable to ambulate three steps without getting SOB. He reports associated exertional chest pain feels like heaviness. He denies LOC, orthopnea and PND. He denies fever, cough, chills, rigors and weight gain. Patient states chronic constipation and hard stool and relates it to iron intake. His wife reports that sometime he becomes confused. He reports compliance to his medications.    In the ED, vitals stable. Intake labs remarkable for Na 130, Cr 1.6. Troponin 0.006 x 2. CTA showed abdominal ascites and stable pancreatic findings.    Hospital Course:  Patient admitted for paracentesis. 10.8L removed. Liver us cirrhotic appearing liver with sequela of portal hypertension including severe volume ascites and splenomegaly. Hepatology saw patient, assistance appreciated. H&H slowly decreasing. Patient blood consent obtained. Will transfuse. Continue albumin     Patient received second paracentesis with 6L off. Patient had an  episode of chest pain after the procedure and was found to be in a fib (new onset). Troponin elevated to 3 and cardiology consulted. Patient initiated on ACS protocol. Rhythm spontaneously converted. Cardiology believes his troponin elevated because of demand ischemia due to a fib. Heparin continued and will bridge to coumadin. Hepatology aware.     Interval History: Patient resting comfortably in chair during assessment, wife at bedside. Patient updated on plan of care. All questions answered. Patient and wife agree with plan.     Review of Systems   Constitutional: Positive for activity change. Negative for chills and fever.   Eyes: Negative for photophobia and visual disturbance.   Respiratory: Positive for shortness of breath (with exertion ). Negative for cough, chest tightness and wheezing.    Cardiovascular: Positive for leg swelling. Negative for chest pain and palpitations.   Gastrointestinal: Positive for abdominal distention. Negative for abdominal pain, constipation, diarrhea, nausea and vomiting.   Endocrine: Negative for cold intolerance.   Genitourinary: Negative for dysuria, hematuria and urgency.   Musculoskeletal: Negative for back pain and neck pain.   Skin: Negative for rash and wound.   Neurological: Positive for weakness. Negative for dizziness, facial asymmetry, speech difficulty and light-headedness.   Psychiatric/Behavioral: Negative for agitation and confusion. The patient is not nervous/anxious.      Objective:     Vital Signs (Most Recent):  Temp: 98.2 °F (36.8 °C) (03/17/19 0810)  Pulse: 100 (03/17/19 0810)  Resp: 14 (03/17/19 0810)  BP: 109/60 (03/17/19 0810)  SpO2: 95 % (03/17/19 0810) Vital Signs (24h Range):  Temp:  [97.3 °F (36.3 °C)-98.2 °F (36.8 °C)] 98.2 °F (36.8 °C)  Pulse:  [] 100  Resp:  [14-18] 14  SpO2:  [95 %-97 %] 95 %  BP: (105-119)/(56-69) 109/60     Weight: 134.6 kg (296 lb 13.6 oz)  Body mass index is 46.49 kg/m².    Intake/Output Summary (Last 24 hours) at  3/17/2019 1135  Last data filed at 3/17/2019 0300  Gross per 24 hour   Intake 480 ml   Output 975 ml   Net -495 ml      Physical Exam   Constitutional: He is oriented to person, place, and time. He appears well-developed and well-nourished. No distress.   Cardiovascular: Normal rate, regular rhythm and normal heart sounds.   No murmur heard.  Pulmonary/Chest: Effort normal and breath sounds normal. He has no wheezes. He has no rales.   Abdominal: Soft. Bowel sounds are normal. He exhibits distension (improved), fluid wave and ascites. There is no tenderness. There is no guarding.   Musculoskeletal: Normal range of motion. He exhibits edema (BLE). He exhibits no tenderness.   Neurological: He is alert and oriented to person, place, and time. No cranial nerve deficit or sensory deficit.   Skin: Skin is warm and dry. Capillary refill takes less than 2 seconds. He is not diaphoretic.   Scattered ecchymoses   Psychiatric: He has a normal mood and affect. His behavior is normal. Thought content normal.   Nursing note and vitals reviewed.      Significant Labs: All pertinent labs within the past 24 hours have been reviewed.    Significant Imaging: I have reviewed all pertinent imaging results/findings within the past 24 hours.    Assessment/Plan:      * Ascites    Shortness of breath    Patient with known cirrhosis and systolic heart failure presenting with worsening SOB, ascites, and LE edema for 4 weeks.  - CTA + ascites and edema  -IR paracentesis with 10.8L off; second para with 6 L off   - Liver US cirrhotic appearing liver with sequela of portal hypertension including severe volume ascites and splenomegaly  - hepatology recommendations:   daily CMP, CBC, INR  Strict I/o  Renally dose medications  Obtain Ir transjugular liver biopsy with portal pressure  Fluid analysis: Amylase 42; bilirubin 0.2; creatinine 1; glucose 198; LD 98; albumin 0.9  - Patient states increased debility due to weight gain and SOB--PT/OT  recommend home  - strict Is/Os  - daily weights       Anemia    - Hgb 9.3>8.3>7.4  - PRBC one unit  - blood consent obtained  - Iron 25, TIBC 55, transferrin 37--likely anemia of chronic disease    -responded well to transfusion  -CT does not show active bleeding  - H&H trend stable          Paroxysmal atrial fibrillation    - converted spontaneously  - tele  - likely s/t volume depletion from multiple large volume paracentesis   - heparin bridge to coumadin   - patient has been in regular rhythm since episode of a fib        NSTEMI (non-ST elevated myocardial infarction)    After IR procedure, patient went into afib with RVR and began having crushing 8/10 chest pain with nausea and shortness of breath. His symptoms improved with rate control strategy.  - EKG T wave depressions in inferior and lateral leads concerning for ischemia  - initial troponin 0.014, second 3.011, trended to peak   - Cardiology consulted, believe demand ischemia s/t A fib; continue heparin   - start ACS protocol (heparin gtt, , Brilinta load, with daily dose)  - monitor for signs of bleeding given anemia of unknown etiology (chronic disease?)   - CT negative for bleed  - demand ischemia in setting of anemia and known CAD (stents and CABG) likely while in RVR     ELIEZER (acute kidney injury)    Baseline Cr 1.0-1.2  - Cr 1.6 on admission  -1.3--resolved  - daily BMP  - hold ACE and diuretics  - unclear if hepatorenal etiology     DM type 2 with diabetic peripheral neuropathy    - hold home insulin  - low dose SSI while NPO     Chronic combined systolic and diastolic heart failure    - holding diuretics in setting of ELIEZER  - strict Is/Os, daily weights  - holding ACE     Essential hypertension    - hold home BP meds as hypotensive     Chronic pancreatitis    - continue enzymes  - CT stable     Coronary artery disease due to calcified coronary lesion    - reports history of 5 stents with CABG at  in 2017  - scheduled for PET stress next week  to evaluate patency  - EKG without ischemic changes  - troponin negative x 2       VTE Risk Mitigation (From admission, onward)        Ordered     heparin 25,000 units in dextrose 5% 250 mL (100 units/mL) infusion LOW INTENSITY nomogram - [facility]  Continuous      03/16/19 0657     heparin 25,000 units in dextrose 5% (100 units/ml) IV bolus from bag - ADDITIONAL PRN BOLUS - 60 units/kg (max bolus 4000 units)  As needed (PRN)      03/16/19 0355     heparin 25,000 units in dextrose 5% (100 units/ml) IV bolus from bag - ADDITIONAL PRN BOLUS - 30 units/kg (max bolus 4000 units)  As needed (PRN)      03/16/19 0355     IP VTE HIGH RISK PATIENT  Once      03/13/19 1409     Place sequential compression device  Until discontinued      03/13/19 1409     Place BRANNON hose  Until discontinued      03/13/19 0310     Place sequential compression device  Until discontinued      03/13/19 0310              Yessica Dc NP  Department of Hospital Medicine   [facility]

## 2019-03-17 NOTE — PROGRESS NOTES
[facility]  Cardiology  Progress Note    Patient Name: Jian Arrieta  MRN: [id number]  Admission Date: 3/12/2019  Hospital Length of Stay: 2 days  Code Status: Full Code   Attending Physician: SELENA Castillo MD   Primary Care Physician: Samir Mahmood MD  Expected Discharge Date: 3/18/2019  Principal Problem:Ascites    Subjective:     Hospital Course:   No notes on file    Interval History: Feeling well, no chest pain since the episode    Tele: SR with HR     ROS  Objective:     Vital Signs (Most Recent):  Temp: 97.9 °F (36.6 °C) (03/17/19 1215)  Pulse: 105 (03/17/19 1215)  Resp: 14 (03/17/19 1215)  BP: (!) 103/55 (03/17/19 1215)  SpO2: 97 % (03/17/19 1215) Vital Signs (24h Range):  Temp:  [97.4 °F (36.3 °C)-98.2 °F (36.8 °C)] 97.9 °F (36.6 °C)  Pulse:  [] 105  Resp:  [14-18] 14  SpO2:  [95 %-97 %] 97 %  BP: (103-119)/(55-69) 103/55     Weight: 134.6 kg (296 lb 13.6 oz)  Body mass index is 46.49 kg/m².     SpO2: 97 %  O2 Device (Oxygen Therapy): room air      Intake/Output Summary (Last 24 hours) at 3/17/2019 1311  Last data filed at 3/17/2019 1100  Gross per 24 hour   Intake 480 ml   Output 1600 ml   Net -1120 ml       Lines/Drains/Airways     Peripherally Inserted Central Catheter Line                 PICC Double Lumen 03/15/19 1733 right brachial 1 day          Peripheral Intravenous Line                 Peripheral IV - Single Lumen 03/15/19 1340 Anterior;Distal;Right Upper Arm 1 day                Physical Exam   Constitutional: He is oriented to person, place, and time. He appears well-developed and well-nourished. No distress.   Obese   HENT:   Head: Normocephalic and atraumatic.   Mouth/Throat: Oropharynx is clear and moist.   Eyes: Conjunctivae and EOM are normal. Pupils are equal, round, and reactive to light. No scleral icterus.   Neck: Normal range of motion. Neck supple. No JVD present.   Cardiovascular: Normal rate, regular rhythm, normal heart sounds and intact distal  pulses.   No murmur heard.  Pulses:       Radial pulses are 2+ on the right side, and 2+ on the left side.   Pulmonary/Chest: Effort normal and breath sounds normal. No respiratory distress.   Symmetrical expansion   Abdominal: Soft. Bowel sounds are normal. He exhibits distension. There is no hepatosplenomegaly. There is no tenderness. There is no rebound.   Musculoskeletal: Normal range of motion. He exhibits edema.   Neurological: He is alert and oriented to person, place, and time. No cranial nerve deficit.   Skin: Skin is warm and dry. No rash noted. He is not diaphoretic.   Psychiatric: He has a normal mood and affect. Judgment and thought content normal.       Significant Labs:   ABG: No results for input(s): PH, PCO2, HCO3, POCSATURATED, BE in the last 48 hours., CMP   Recent Labs   Lab 03/16/19 0230 03/16/19  0957 03/17/19  0440   * 132* 136   K 3.8 3.9 3.5    103 106   CO2 23 24 24   * 396* 209*   BUN 22 21 21   CREATININE 1.2 1.2 1.2   CALCIUM 7.6* 7.7* 7.7*   PROT 4.4* 4.3* 4.1*   ALBUMIN 2.7* 2.6* 2.7*   BILITOT 0.7 1.9* 0.5   ALKPHOS 68 75 65   AST 21 34 32   ALT 9* 11 12   ANIONGAP 6* 5* 6*   ESTGFRAFRICA >60.0 >60.0 >60.0   EGFRNONAA >60.0 >60.0 >60.0   , INR   Recent Labs   Lab 03/16/19  0230 03/16/19  0957 03/17/19  0440   INR 1.4* 1.4* 1.4*   , Lipid Panel No results for input(s): CHOL, HDL, LDLCALC, TRIG, CHOLHDL in the last 48 hours. and Troponin   Recent Labs   Lab 03/16/19  0222 03/16/19  0957 03/16/19  1530   TROPONINI 3.011* 3.456*  3.456* 2.675*         Assessment and Plan:       NSTEMI (non-ST elevated myocardial infarction)    NSTEMI with ANGELA = 4 with history of progressive angina as outpatient in the setting of ICM EF 30% and cirrhosis, currently without signs of fluid overload. Likely myocardial injury from significant underlying disease exacerbated by elevated HR due to new-onset AF with RVR.    - Continue heparin drip ACS protocol  - Continue brilinta 90mg BID  -  continue ASA 81mg  - agree with statin, recommend 80mg if ok with hepatology  - up-titrate metoprolol to target HR ~60, can change to cavedilol if requested by hepatology  - monitor carefully for bleeding  - recommend keeping patient on the dry side  - interventional cardiology consult for angiographic evaluation  - will sign off, thank you for the consult, please call back with any questions       Paroxysmal atrial fibrillation    Paroxysmal AF, new onset with CUPCX7X = 4    - continue heparin for now, once stable will need transition to full AC with coumadin (unless there is a DOAC that hepatology is ok with)  - HR control as per NSTEMI problem  - recommend amiodarone to keep patient out of AF, would clear this with hepatology         VTE Risk Mitigation (From admission, onward)        Ordered     heparin 25,000 units in dextrose 5% 250 mL (100 units/mL) infusion LOW INTENSITY nomogram - [facility]  Continuous      03/16/19 0657     heparin 25,000 units in dextrose 5% (100 units/ml) IV bolus from bag - ADDITIONAL PRN BOLUS - 60 units/kg (max bolus 4000 units)  As needed (PRN)      03/16/19 0355     heparin 25,000 units in dextrose 5% (100 units/ml) IV bolus from bag - ADDITIONAL PRN BOLUS - 30 units/kg (max bolus 4000 units)  As needed (PRN)      03/16/19 0355     IP VTE HIGH RISK PATIENT  Once      03/13/19 1409     Place sequential compression device  Until discontinued      03/13/19 1409     Place BRANNON hose  Until discontinued      03/13/19 0310     Place sequential compression device  Until discontinued      03/13/19 0310          Jake Escobar MD  Cardiology  [facility]

## 2019-03-17 NOTE — ASSESSMENT & PLAN NOTE
- Hgb 9.3>8.3>7.4  - PRBC one unit  - blood consent obtained  - Iron 25, TIBC 55, transferrin 37--likely anemia of chronic disease    -responded well to transfusion  -CT does not show active bleeding  - H&H trend stable

## 2019-03-17 NOTE — ASSESSMENT & PLAN NOTE
NSTEMI with ANGELA = 4 with history of progressive angina as outpatient in the setting of ICM EF 30% and cirrhosis, currently without signs of fluid overload. Likely myocardial injury from significant underlying disease exacerbated by elevated HR due to new-onset AF with RVR.    - Continue heparin drip ACS protocol  - Continue brilinta 90mg BID  - continue ASA 81mg  - agree with statin, recommend 80mg if ok with hepatology  - up-titrate metoprolol to target HR ~60, can change to cavedilol if requested by hepatology  - monitor carefully for bleeding  - recommend keeping patient on the dry side  - interventional cardiology consult for angiographic evaluation  - will sign off, thank you for the consult, please call back with any questions

## 2019-03-17 NOTE — ASSESSMENT & PLAN NOTE
- converted spontaneously  - tele  - likely s/t volume depletion from multiple large volume paracentesis   - heparin

## 2019-03-17 NOTE — SUBJECTIVE & OBJECTIVE
Interval History: Feeling well, no chest pain since the episode    Tele: SR with HR     ROS  Objective:     Vital Signs (Most Recent):  Temp: 97.9 °F (36.6 °C) (03/17/19 1215)  Pulse: 105 (03/17/19 1215)  Resp: 14 (03/17/19 1215)  BP: (!) 103/55 (03/17/19 1215)  SpO2: 97 % (03/17/19 1215) Vital Signs (24h Range):  Temp:  [97.4 °F (36.3 °C)-98.2 °F (36.8 °C)] 97.9 °F (36.6 °C)  Pulse:  [] 105  Resp:  [14-18] 14  SpO2:  [95 %-97 %] 97 %  BP: (103-119)/(55-69) 103/55     Weight: 134.6 kg (296 lb 13.6 oz)  Body mass index is 46.49 kg/m².     SpO2: 97 %  O2 Device (Oxygen Therapy): room air      Intake/Output Summary (Last 24 hours) at 3/17/2019 1311  Last data filed at 3/17/2019 1100  Gross per 24 hour   Intake 480 ml   Output 1600 ml   Net -1120 ml       Lines/Drains/Airways     Peripherally Inserted Central Catheter Line                 PICC Double Lumen 03/15/19 1733 right brachial 1 day          Peripheral Intravenous Line                 Peripheral IV - Single Lumen 03/15/19 1340 Anterior;Distal;Right Upper Arm 1 day                Physical Exam   Constitutional: He is oriented to person, place, and time. He appears well-developed and well-nourished. No distress.   Obese   HENT:   Head: Normocephalic and atraumatic.   Mouth/Throat: Oropharynx is clear and moist.   Eyes: Conjunctivae and EOM are normal. Pupils are equal, round, and reactive to light. No scleral icterus.   Neck: Normal range of motion. Neck supple. No JVD present.   Cardiovascular: Normal rate, regular rhythm, normal heart sounds and intact distal pulses.   No murmur heard.  Pulses:       Radial pulses are 2+ on the right side, and 2+ on the left side.   Pulmonary/Chest: Effort normal and breath sounds normal. No respiratory distress.   Symmetrical expansion   Abdominal: Soft. Bowel sounds are normal. He exhibits distension. There is no hepatosplenomegaly. There is no tenderness. There is no rebound.   Musculoskeletal: Normal range of  motion. He exhibits edema.   Neurological: He is alert and oriented to person, place, and time. No cranial nerve deficit.   Skin: Skin is warm and dry. No rash noted. He is not diaphoretic.   Psychiatric: He has a normal mood and affect. Judgment and thought content normal.       Significant Labs:   ABG: No results for input(s): PH, PCO2, HCO3, POCSATURATED, BE in the last 48 hours., CMP   Recent Labs   Lab 03/16/19  0230 03/16/19  0957 03/17/19  0440   * 132* 136   K 3.8 3.9 3.5    103 106   CO2 23 24 24   * 396* 209*   BUN 22 21 21   CREATININE 1.2 1.2 1.2   CALCIUM 7.6* 7.7* 7.7*   PROT 4.4* 4.3* 4.1*   ALBUMIN 2.7* 2.6* 2.7*   BILITOT 0.7 1.9* 0.5   ALKPHOS 68 75 65   AST 21 34 32   ALT 9* 11 12   ANIONGAP 6* 5* 6*   ESTGFRAFRICA >60.0 >60.0 >60.0   EGFRNONAA >60.0 >60.0 >60.0   , INR   Recent Labs   Lab 03/16/19  0230 03/16/19  0957 03/17/19  0440   INR 1.4* 1.4* 1.4*   , Lipid Panel No results for input(s): CHOL, HDL, LDLCALC, TRIG, CHOLHDL in the last 48 hours. and Troponin   Recent Labs   Lab 03/16/19  0222 03/16/19  0957 03/16/19  1530   TROPONINI 3.011* 3.456*  3.456* 2.675*

## 2019-03-17 NOTE — PROGRESS NOTES
[facility] Hospital Medicine  Progress Note    Patient Name: Jian Arrieta  MRN: [id number]  Patient Class: IP- Inpatient   Admission Date: 3/12/2019  Length of Stay: 1 days  Attending Physician: SELENA Castillo MD  Primary Care Provider: Samir Mahmood MD    [facility] Medicine Team: [facility] Yessica Dc NP    Subjective:     Principal Problem:Ascites    HPI:  Patient is a 64yo male with a PMHx of CAD (s/p PCI with 5 stents) with subsequent CABG x 3v (2017 at ), HLD, DM2, BERHANE on CPAP, EtOh cirrhosis being admitted to observation for shortness of breath due to ascites. Patient reports worsening shortness of breath for the last 4 weeks with associated LE edema and belly distension. He reports having a therapeutic paracentesis 4 weeks ago which removed 5L of fluid.  His body swelling and SOB improved temporarily then started to worsen again. Now he is SOB with minimal exertion. Patient at his baseline is able to walk at his house from one end to the other. Now he is unable to ambulate three steps without getting SOB. He reports associated exertional chest pain feels like heaviness. He denies LOC, orthopnea and PND. He denies fever, cough, chills, rigors and weight gain. Patient states chronic constipation and hard stool and relates it to iron intake. His wife reports that sometime he becomes confused. He reports compliance to his medications.    In the ED, vitals stable. Intake labs remarkable for Na 130, Cr 1.6. Troponin 0.006 x 2. CTA showed abdominal ascites and stable pancreatic findings.    Hospital Course:  Patient admitted for paracentesis. 10.8L removed. Liver us cirrhotic appearing liver with sequela of portal hypertension including severe volume ascites and splenomegaly. Hepatology saw patient, assistance appreciated. H&H slowly decreasing. Patient blood consent obtained. Will transfuse. Continue albumin     Patient received second paracentesis with 6L off. Patient had an  episode of chest pain after the procedure and was found to be in a fib (new onset). Troponin elevated to 3 and cardiology consulted. Patient initiated on ACS protocol. Rhythm spontaneously converted. Cardiology believes his troponin elevated because of demand ischemia due to a fib. Heparin continued and will bridge to coumadin. Hepatology aware.     Interval History: Patient resting in bed. No complaints on chest pain at this time. Patient in NSR. Patient updated on plan of care and all questions answered.     Review of Systems   Constitutional: Positive for activity change. Negative for chills and fever.   Eyes: Negative for photophobia and visual disturbance.   Respiratory: Positive for shortness of breath (with exertion ). Negative for cough, chest tightness and wheezing.    Cardiovascular: Positive for chest pain (resolved) and leg swelling. Negative for palpitations.   Gastrointestinal: Positive for abdominal distention. Negative for abdominal pain, constipation, diarrhea, nausea and vomiting.   Endocrine: Negative for cold intolerance.   Genitourinary: Negative for dysuria, hematuria and urgency.   Musculoskeletal: Negative for back pain and neck pain.   Skin: Negative for rash and wound.   Neurological: Positive for weakness. Negative for dizziness, facial asymmetry, speech difficulty and light-headedness.   Psychiatric/Behavioral: Negative for agitation and confusion. The patient is not nervous/anxious.      Objective:     Vital Signs (Most Recent):  Temp: 97.3 °F (36.3 °C) (03/16/19 1230)  Pulse: 96 (03/16/19 1230)  Resp: 14 (03/16/19 1230)  BP: (!) 105/56 (03/16/19 1230)  SpO2: 97 % (03/16/19 1230) Vital Signs (24h Range):  Temp:  [96.1 °F (35.6 °C)-99.8 °F (37.7 °C)] 97.3 °F (36.3 °C)  Pulse:  [] 96  Resp:  [12-20] 14  SpO2:  [90 %-100 %] 97 %  BP: ()/(48-82) 105/56     Weight: 134.6 kg (296 lb 13.6 oz)  Body mass index is 46.49 kg/m².    Intake/Output Summary (Last 24 hours) at 3/16/2019  1533  Last data filed at 3/16/2019 0839  Gross per 24 hour   Intake 1109.17 ml   Output 6250 ml   Net -5140.83 ml      Physical Exam   Constitutional: He is oriented to person, place, and time. He appears well-developed and well-nourished. No distress.   Cardiovascular: Normal rate and regular rhythm.   Pulmonary/Chest: Effort normal. He has no wheezes. He has no rales.   Abdominal: Soft. Bowel sounds are normal. He exhibits distension (improved), fluid wave and ascites. There is no tenderness. There is no guarding.   Musculoskeletal: Normal range of motion. He exhibits edema (BLE). He exhibits no tenderness.   Neurological: He is alert and oriented to person, place, and time. No cranial nerve deficit or sensory deficit.   Skin: Skin is warm and dry. Capillary refill takes less than 2 seconds. He is not diaphoretic.   Scattered ecchymoses   Psychiatric: He has a normal mood and affect. His behavior is normal. Thought content normal.   Nursing note and vitals reviewed.      Significant Labs: All pertinent labs within the past 24 hours have been reviewed.    Significant Imaging: I have reviewed all pertinent imaging results/findings within the past 24 hours.    Assessment/Plan:      * Ascites    Shortness of breath    Patient with known cirrhosis and systolic heart failure presenting with worsening SOB, ascites, and LE edema for 4 weeks.  - CTA + ascites and edema  -IR paracentesis with 10.8L off; second para with 6 L off   - Liver US cirrhotic appearing liver with sequela of portal hypertension including severe volume ascites and splenomegaly  - hepatology recommendations:   daily CMP, CBC, INR  Strict I/o  Renally dose medications  Obtain Ir transjugular liver biopsy with portal pressure  Fluid analysis: Amylase 42; bilirubin 0.2; creatinine 1; glucose 198; LD 98; albumin 0.9  - Patient states increased debility due to weight gain and SOB--PT/OT recommend home  - strict Is/Os  - daily weights       Anemia    - Hgb  9.3>8.3>7.4  - patient with new onset cold intolerance  - PRBC one unit  - blood consent obtained  - Iron 25, TIBC 55, transferrin 37--likely anemia of chronic disease    - will continue to monitor         Paroxysmal atrial fibrillation    - converted spontaneously  - tele  - likely s/t volume depletion from multiple large volume paracentesis   - heparin        NSTEMI (non-ST elevated myocardial infarction)    After IR procedure, patient went into afib with RVR and began having crushing 8/10 chest pain with nausea and shortness of breath. His symptoms improved with rate control strategy.  - EKG T wave depressions in inferior and lateral leads concerning for ischemia  - initial troponin 0.014, second 3.011, trended to peak   - Cardiology consulted, believe demand ischemia s/t A fib; continue heparin   - start ACS protocol (heparin gtt, , Brilinta load, with daily dose)  - monitor for signs of bleeding given anemia of unknown etiology (chronic disease?)   - CT negative for bleed  - demand ischemia in setting of anemia and known CAD (stents and CABG) likely while in RVR     ELIEZER (acute kidney injury)    Baseline Cr 1.0-1.2  - Cr 1.6 on admission  -1.3--resolved  - daily BMP  - hold ACE and diuretics  - unclear if hepatorenal etiology     DM type 2 with diabetic peripheral neuropathy    - hold home insulin  - low dose SSI while NPO     Chronic combined systolic and diastolic heart failure    - holding diuretics in setting of ELIEZER  - strict Is/Os, daily weights  - holding ACE     Essential hypertension    - hold home BP meds as hypotensive     Chronic pancreatitis    - continue enzymes  - CT stable     Coronary artery disease due to calcified coronary lesion    - reports history of 5 stents with CABG at  in 2017  - scheduled for PET stress next week to evaluate patency  - EKG without ischemic changes  - troponin negative x 2       VTE Risk Mitigation (From admission, onward)        Ordered     heparin 25,000 units  in dextrose 5% 250 mL (100 units/mL) infusion LOW INTENSITY nomogram - [facility]  Continuous      03/16/19 0657     heparin 25,000 units in dextrose 5% (100 units/ml) IV bolus from bag - ADDITIONAL PRN BOLUS - 60 units/kg (max bolus 4000 units)  As needed (PRN)      03/16/19 0355     heparin 25,000 units in dextrose 5% (100 units/ml) IV bolus from bag - ADDITIONAL PRN BOLUS - 30 units/kg (max bolus 4000 units)  As needed (PRN)      03/16/19 0355     IP VTE HIGH RISK PATIENT  Once      03/13/19 1409     Place sequential compression device  Until discontinued      03/13/19 1409     Place BRANNON hose  Until discontinued      03/13/19 0310     Place sequential compression device  Until discontinued      03/13/19 0310              Yessica Dc NP  Department of Hospital Medicine   [facility]

## 2019-03-17 NOTE — SUBJECTIVE & OBJECTIVE
Interval History: Patient resting comfortably in chair during assessment, wife at bedside. Patient updated on plan of care. All questions answered. Patient and wife agree with plan.     Review of Systems   Constitutional: Positive for activity change. Negative for chills and fever.   Eyes: Negative for photophobia and visual disturbance.   Respiratory: Positive for shortness of breath (with exertion ). Negative for cough, chest tightness and wheezing.    Cardiovascular: Positive for leg swelling. Negative for chest pain and palpitations.   Gastrointestinal: Positive for abdominal distention. Negative for abdominal pain, constipation, diarrhea, nausea and vomiting.   Endocrine: Negative for cold intolerance.   Genitourinary: Negative for dysuria, hematuria and urgency.   Musculoskeletal: Negative for back pain and neck pain.   Skin: Negative for rash and wound.   Neurological: Positive for weakness. Negative for dizziness, facial asymmetry, speech difficulty and light-headedness.   Psychiatric/Behavioral: Negative for agitation and confusion. The patient is not nervous/anxious.      Objective:     Vital Signs (Most Recent):  Temp: 98.2 °F (36.8 °C) (03/17/19 0810)  Pulse: 100 (03/17/19 0810)  Resp: 14 (03/17/19 0810)  BP: 109/60 (03/17/19 0810)  SpO2: 95 % (03/17/19 0810) Vital Signs (24h Range):  Temp:  [97.3 °F (36.3 °C)-98.2 °F (36.8 °C)] 98.2 °F (36.8 °C)  Pulse:  [] 100  Resp:  [14-18] 14  SpO2:  [95 %-97 %] 95 %  BP: (105-119)/(56-69) 109/60     Weight: 134.6 kg (296 lb 13.6 oz)  Body mass index is 46.49 kg/m².    Intake/Output Summary (Last 24 hours) at 3/17/2019 1135  Last data filed at 3/17/2019 0300  Gross per 24 hour   Intake 480 ml   Output 975 ml   Net -495 ml      Physical Exam   Constitutional: He is oriented to person, place, and time. He appears well-developed and well-nourished. No distress.   Cardiovascular: Normal rate, regular rhythm and normal heart sounds.   No murmur  heard.  Pulmonary/Chest: Effort normal and breath sounds normal. He has no wheezes. He has no rales.   Abdominal: Soft. Bowel sounds are normal. He exhibits distension (improved), fluid wave and ascites. There is no tenderness. There is no guarding.   Musculoskeletal: Normal range of motion. He exhibits edema (BLE). He exhibits no tenderness.   Neurological: He is alert and oriented to person, place, and time. No cranial nerve deficit or sensory deficit.   Skin: Skin is warm and dry. Capillary refill takes less than 2 seconds. He is not diaphoretic.   Scattered ecchymoses   Psychiatric: He has a normal mood and affect. His behavior is normal. Thought content normal.   Nursing note and vitals reviewed.      Significant Labs: All pertinent labs within the past 24 hours have been reviewed.    Significant Imaging: I have reviewed all pertinent imaging results/findings within the past 24 hours.

## 2019-03-17 NOTE — ASSESSMENT & PLAN NOTE
- converted spontaneously  - tele  - likely s/t volume depletion from multiple large volume paracentesis   - heparin bridge to coumadin   - patient has been in regular rhythm since episode of a fib

## 2019-03-17 NOTE — NURSING
2315 cbg 401, 5 units given per sliding scale    0030 cbg 351, notified IMF, instructed to give additional 7 units of levemir per MD

## 2019-03-17 NOTE — ASSESSMENT & PLAN NOTE
Paroxysmal AF, new onset with HNIXM8Y = 4    - continue heparin for now, once stable will need transition to full AC with coumadin (unless there is a DOAC that hepatology is ok with)  - HR control as per NSTEMI problem  - recommend amiodarone to keep patient out of AF, would clear this with hepatology

## 2019-03-18 PROBLEM — R07.9 CHEST PAIN: Status: RESOLVED | Noted: 2019-03-13 | Resolved: 2019-03-18

## 2019-03-18 LAB
ABO + RH BLD: NORMAL
ALBUMIN SERPL BCP-MCNC: 3.5 G/DL
ALP SERPL-CCNC: 77 U/L
ALT SERPL W/O P-5'-P-CCNC: 16 U/L
ANION GAP SERPL CALC-SCNC: 8 MMOL/L
APTT BLDCRRT: 41.5 SEC
ASCENDING AORTA: 3.52 CM
AST SERPL-CCNC: 20 U/L
AV INDEX (PROSTH): 0.87
AV MEAN GRADIENT: 3.2 MMHG
AV PEAK GRADIENT: 4.84 MMHG
AV VALVE AREA: 2.84 CM2
AV VELOCITY RATIO: 0.61
BACTERIA BLD CULT: NORMAL
BACTERIA SPEC AEROBE CULT: NO GROWTH
BACTERIA SPEC AEROBE CULT: NO GROWTH
BASOPHILS # BLD AUTO: 0.02 K/UL
BASOPHILS NFR BLD: 0.4 %
BILIRUB SERPL-MCNC: 0.7 MG/DL
BLD GP AB SCN CELLS X3 SERPL QL: NORMAL
BSA FOR ECHO PROCEDURE: 2.6 M2
BUN SERPL-MCNC: 21 MG/DL
CALCIUM SERPL-MCNC: 7.9 MG/DL
CHLORIDE SERPL-SCNC: 104 MMOL/L
CO2 SERPL-SCNC: 24 MMOL/L
CREAT SERPL-MCNC: 1.3 MG/DL
CV ECHO LV RWT: 0.41 CM
DIFFERENTIAL METHOD: ABNORMAL
DOP CALC AO PEAK VEL: 1.1 M/S
DOP CALC AO VTI: 15.71 CM
DOP CALC LVOT AREA: 3.27 CM2
DOP CALC LVOT DIAMETER: 2.04 CM
DOP CALC LVOT PEAK VEL: 0.67 M/S
DOP CALC LVOT STROKE VOLUME: 44.63 CM3
DOP CALCLVOT PEAK VEL VTI: 13.66 CM
ECHO LV POSTERIOR WALL: 0.88 CM (ref 0.6–1.1)
EOSINOPHIL # BLD AUTO: 0.1 K/UL
EOSINOPHIL NFR BLD: 2.7 %
ERYTHROCYTE [DISTWIDTH] IN BLOOD BY AUTOMATED COUNT: 15.5 %
EST. GFR  (AFRICAN AMERICAN): >60 ML/MIN/1.73 M^2
EST. GFR  (NON AFRICAN AMERICAN): 57.7 ML/MIN/1.73 M^2
ESTIMATED AVG GLUCOSE: 166 MG/DL
FRACTIONAL SHORTENING: 21 % (ref 28–44)
GLUCOSE SERPL-MCNC: 253 MG/DL
HBA1C MFR BLD HPLC: 7.4 %
HCT VFR BLD AUTO: 29.1 %
HGB BLD-MCNC: 9.7 G/DL
IMM GRANULOCYTES # BLD AUTO: 0.02 K/UL
IMM GRANULOCYTES NFR BLD AUTO: 0.4 %
INR PPP: 1.2
INTERVENTRICULAR SEPTUM: 1.06 CM (ref 0.6–1.1)
LA MAJOR: 5.6 CM
LA MINOR: 5.06 CM
LA WIDTH: 3.47 CM
LEFT ATRIUM SIZE: 4.29 CM
LEFT ATRIUM VOLUME INDEX: 28.1 ML/M2
LEFT ATRIUM VOLUME: 67.27 CM3
LEFT INTERNAL DIMENSION IN SYSTOLE: 3.43 CM (ref 2.1–4)
LEFT VENTRICLE DIASTOLIC VOLUME INDEX: 35.06 ML/M2
LEFT VENTRICLE DIASTOLIC VOLUME: 83.85 ML
LEFT VENTRICLE MASS INDEX: 57.5 G/M2
LEFT VENTRICLE SYSTOLIC VOLUME INDEX: 20.2 ML/M2
LEFT VENTRICLE SYSTOLIC VOLUME: 48.3 ML
LEFT VENTRICULAR INTERNAL DIMENSION IN DIASTOLE: 4.32 CM (ref 3.5–6)
LEFT VENTRICULAR MASS: 137.62 G
LYMPHOCYTES # BLD AUTO: 0.6 K/UL
LYMPHOCYTES NFR BLD: 12.4 %
MCH RBC QN AUTO: 29 PG
MCHC RBC AUTO-ENTMCNC: 33.3 G/DL
MCV RBC AUTO: 87 FL
MONOCYTES # BLD AUTO: 0.1 K/UL
MONOCYTES NFR BLD: 2.9 %
NEUTROPHILS # BLD AUTO: 3.9 K/UL
NEUTROPHILS NFR BLD: 81.2 %
NRBC BLD-RTO: 0 /100 WBC
PLATELET # BLD AUTO: 193 K/UL
PMV BLD AUTO: 9.7 FL
POCT GLUCOSE: 179 MG/DL (ref 70–110)
POCT GLUCOSE: 236 MG/DL (ref 70–110)
POCT GLUCOSE: 273 MG/DL (ref 70–110)
POCT GLUCOSE: 295 MG/DL (ref 70–110)
POTASSIUM SERPL-SCNC: 3.9 MMOL/L
PROT SERPL-MCNC: 5.1 G/DL
PROTHROMBIN TIME: 11.7 SEC
RA MAJOR: 4.87 CM
RA PRESSURE: 3 MMHG
RA WIDTH: 3.55 CM
RBC # BLD AUTO: 3.35 M/UL
RIGHT VENTRICULAR END-DIASTOLIC DIMENSION: 3.22 CM
SINUS: 3.67 CM
SODIUM SERPL-SCNC: 136 MMOL/L
STJ: 3.66 CM
TDI LATERAL: 0.12
TDI SEPTAL: 0.07
TDI: 0.1
TRICUSPID ANNULAR PLANE SYSTOLIC EXCURSION: 1.59 CM
WBC # BLD AUTO: 4.76 K/UL

## 2019-03-18 PROCEDURE — 85610 PROTHROMBIN TIME: CPT

## 2019-03-18 PROCEDURE — 97530 THERAPEUTIC ACTIVITIES: CPT

## 2019-03-18 PROCEDURE — 80053 COMPREHEN METABOLIC PANEL: CPT

## 2019-03-18 PROCEDURE — 25000003 PHARM REV CODE 250: Performed by: HOSPITALIST

## 2019-03-18 PROCEDURE — P9047 ALBUMIN (HUMAN), 25%, 50ML: HCPCS | Mod: JG | Performed by: NURSE PRACTITIONER

## 2019-03-18 PROCEDURE — 25000003 PHARM REV CODE 250: Performed by: NURSE PRACTITIONER

## 2019-03-18 PROCEDURE — 86022 PLATELET ANTIBODIES: CPT

## 2019-03-18 PROCEDURE — S5571 INSULIN DISPOS PEN 3 ML: HCPCS | Performed by: PHYSICIAN ASSISTANT

## 2019-03-18 PROCEDURE — 63600175 PHARM REV CODE 636 W HCPCS: Performed by: PHYSICIAN ASSISTANT

## 2019-03-18 PROCEDURE — 85025 COMPLETE CBC W/AUTO DIFF WBC: CPT

## 2019-03-18 PROCEDURE — 85730 THROMBOPLASTIN TIME PARTIAL: CPT

## 2019-03-18 PROCEDURE — 83036 HEMOGLOBIN GLYCOSYLATED A1C: CPT

## 2019-03-18 PROCEDURE — 11000001 HC ACUTE MED/SURG PRIVATE ROOM

## 2019-03-18 PROCEDURE — 93010 ELECTROCARDIOGRAM REPORT: CPT | Mod: ,,, | Performed by: INTERNAL MEDICINE

## 2019-03-18 PROCEDURE — 25000003 PHARM REV CODE 250: Performed by: STUDENT IN AN ORGANIZED HEALTH CARE EDUCATION/TRAINING PROGRAM

## 2019-03-18 PROCEDURE — 86901 BLOOD TYPING SEROLOGIC RH(D): CPT

## 2019-03-18 PROCEDURE — 25000003 PHARM REV CODE 250

## 2019-03-18 PROCEDURE — 99232 PR SUBSEQUENT HOSPITAL CARE,LEVL II: ICD-10-PCS | Mod: ,,, | Performed by: HOSPITALIST

## 2019-03-18 PROCEDURE — 93010 EKG 12-LEAD: ICD-10-PCS | Mod: ,,, | Performed by: INTERNAL MEDICINE

## 2019-03-18 PROCEDURE — 63600175 PHARM REV CODE 636 W HCPCS: Performed by: NURSE PRACTITIONER

## 2019-03-18 PROCEDURE — 99232 SBSQ HOSP IP/OBS MODERATE 35: CPT | Mod: ,,, | Performed by: HOSPITALIST

## 2019-03-18 PROCEDURE — 36415 COLL VENOUS BLD VENIPUNCTURE: CPT

## 2019-03-18 PROCEDURE — 25000003 PHARM REV CODE 250: Performed by: PHYSICIAN ASSISTANT

## 2019-03-18 PROCEDURE — 63600175 PHARM REV CODE 636 W HCPCS: Performed by: INTERNAL MEDICINE

## 2019-03-18 PROCEDURE — 93005 ELECTROCARDIOGRAM TRACING: CPT

## 2019-03-18 RX ORDER — SODIUM CHLORIDE 9 MG/ML
INJECTION, SOLUTION INTRAVENOUS CONTINUOUS
Status: DISCONTINUED | OUTPATIENT
Start: 2019-03-18 | End: 2019-03-21 | Stop reason: HOSPADM

## 2019-03-18 RX ORDER — METOPROLOL TARTRATE 50 MG/1
50 TABLET ORAL 2 TIMES DAILY
Status: DISCONTINUED | OUTPATIENT
Start: 2019-03-18 | End: 2019-03-21 | Stop reason: HOSPADM

## 2019-03-18 RX ORDER — AMIODARONE HYDROCHLORIDE 200 MG/1
200 TABLET ORAL DAILY
Status: DISCONTINUED | OUTPATIENT
Start: 2019-04-01 | End: 2019-03-21 | Stop reason: HOSPADM

## 2019-03-18 RX ORDER — AMIODARONE HYDROCHLORIDE 200 MG/1
400 TABLET ORAL 2 TIMES DAILY
Status: DISCONTINUED | OUTPATIENT
Start: 2019-03-18 | End: 2019-03-21 | Stop reason: HOSPADM

## 2019-03-18 RX ORDER — DIPHENHYDRAMINE HCL 50 MG
50 CAPSULE ORAL EVERY 6 HOURS
Status: COMPLETED | OUTPATIENT
Start: 2019-03-18 | End: 2019-03-18

## 2019-03-18 RX ADMIN — ASPIRIN 81 MG CHEWABLE TABLET 81 MG: 81 TABLET CHEWABLE at 10:03

## 2019-03-18 RX ADMIN — PANTOPRAZOLE SODIUM 40 MG: 40 TABLET, DELAYED RELEASE ORAL at 10:03

## 2019-03-18 RX ADMIN — INSULIN ASPART 3 UNITS: 100 INJECTION, SOLUTION INTRAVENOUS; SUBCUTANEOUS at 10:03

## 2019-03-18 RX ADMIN — INSULIN ASPART 6 UNITS: 100 INJECTION, SOLUTION INTRAVENOUS; SUBCUTANEOUS at 10:03

## 2019-03-18 RX ADMIN — INSULIN DETEMIR 7 UNITS: 100 INJECTION, SOLUTION SUBCUTANEOUS at 10:03

## 2019-03-18 RX ADMIN — METOPROLOL TARTRATE 50 MG: 50 TABLET ORAL at 10:03

## 2019-03-18 RX ADMIN — SPIRONOLACTONE 100 MG: 100 TABLET, FILM COATED ORAL at 10:03

## 2019-03-18 RX ADMIN — ALBUMIN HUMAN 25 G: 0.25 SOLUTION INTRAVENOUS at 03:03

## 2019-03-18 RX ADMIN — AMIODARONE HYDROCHLORIDE 400 MG: 200 TABLET ORAL at 12:03

## 2019-03-18 RX ADMIN — PANCRELIPASE 3 CAPSULE: 60000; 12000; 38000 CAPSULE, DELAYED RELEASE PELLETS ORAL at 12:03

## 2019-03-18 RX ADMIN — TICAGRELOR 90 MG: 90 TABLET ORAL at 10:03

## 2019-03-18 RX ADMIN — FUROSEMIDE 40 MG: 40 TABLET ORAL at 10:03

## 2019-03-18 RX ADMIN — HUMAN ALBUMIN MICROSPHERES AND PERFLUTREN 0.66 MG: 10; .22 INJECTION, SOLUTION INTRAVENOUS at 04:03

## 2019-03-18 RX ADMIN — DIPHENHYDRAMINE HYDROCHLORIDE 50 MG: 50 CAPSULE ORAL at 12:03

## 2019-03-18 RX ADMIN — AMIODARONE HYDROCHLORIDE 400 MG: 200 TABLET ORAL at 10:03

## 2019-03-18 RX ADMIN — PANCRELIPASE 3 CAPSULE: 60000; 12000; 38000 CAPSULE, DELAYED RELEASE PELLETS ORAL at 05:03

## 2019-03-18 RX ADMIN — TICAGRELOR 90 MG: 90 TABLET ORAL at 09:03

## 2019-03-18 RX ADMIN — METOPROLOL TARTRATE 50 MG: 50 TABLET ORAL at 09:03

## 2019-03-18 RX ADMIN — ALBUMIN HUMAN 25 G: 0.25 SOLUTION INTRAVENOUS at 12:03

## 2019-03-18 RX ADMIN — INSULIN ASPART 4 UNITS: 100 INJECTION, SOLUTION INTRAVENOUS; SUBCUTANEOUS at 04:03

## 2019-03-18 RX ADMIN — HEPARIN SODIUM AND DEXTROSE 12 UNITS/KG/HR: 10000; 5 INJECTION INTRAVENOUS at 03:03

## 2019-03-18 RX ADMIN — ATORVASTATIN CALCIUM 40 MG: 20 TABLET, FILM COATED ORAL at 10:03

## 2019-03-18 NOTE — ASSESSMENT & PLAN NOTE
Ef reported as 30% recommend HF therapy with ACE-I and beta blocker such as coreg BID or Metoprolol Succinate daily.

## 2019-03-18 NOTE — PLAN OF CARE
Problem: Physical Therapy Goal  Goal: Physical Therapy Goal  Goals to be met by: 3/24/19     Patient will increase functional independence with mobility by performin. Supine to sit with Modified Nicholas  2. Gait  x 100 feet with Supervision using Rolling Walker or LRD.   3. Ascend/descend 3 stair with right Handrails Contact Guard Assistance using Rolling Walker or LRD.   4. Stand for 10 minutes with Contact Guard Assistance using Rolling Walker or LRD to prepare for standing ADLs.        Outcome: Ongoing (interventions implemented as appropriate)  Pt would benefit from continued skilled acute PT 3x/wk to improve functional mobility. Pt progressing well and as expected, POC is to cont. Towards current goals set to improve towards PLOF.

## 2019-03-18 NOTE — PLAN OF CARE
03/18/19 1747   Discharge Reassessment   Assessment Type Discharge Planning Reassessment   Discharge Plan A Home Health  (OHH)   Discharge Plan B Home with family   Anticipated Discharge Disposition Home-Health   Can the patient answer the patient profile reliably? Yes, cognitively intact   How does the patient rate their overall health at the present time? Poor   Describe the patient's ability to walk at the present time. Minor restrictions or changes   How often would a person be available to care for the patient? Often   Number of comorbid conditions (as recorded on the chart) Five or more   Post-Acute Status   Post-Acute Authorization Home Health/Hospice     Patient scheduled to have a cardiac cath done tomorrow. Plan to discharge patient home with support or home with home health when medically stable. Will continue to follow.

## 2019-03-18 NOTE — HPI
62yo male interventional cardiology consulted for NSTEMi in the setting of AF RVR rate 150's with peak trop 3. Has been having stable angina CCS III angina for the past 3 months.   His chest pain post IR biopsy and in AF RVR was described as substernal pressure and tightening, same as his angina at home but worse in severity. He is a diabetic on insulin and former  quit smoking 15 years ago and has a 15 year pack hx. He had multiple angiograms and ANNALEE in the past and follows at , had CABG x3 in 2017 and has not had an angiogram since.    Cirrhosis, ICM EF 30% with history of PCI and CABG with unknown anatomy, HTN, HLD, DM, BERHANE, and alcohol use c/b by pancreatitis/pseudocyst. Hepatology is being consulted due to ongoing ascites.     He was initially admitted on 3/13/19 due to shortness of breath and lower extremity edema. On admission Na was 130 and he had an ELIEZER (Cr 1.6), CTA with no PE, but ground-glass attenuation of the lungs, large volume abdominal ascites, and persistent air and fluid collection along the body of the pancreas suggestive of pancreatic necrosis or pseudocyst.

## 2019-03-18 NOTE — CONSULTS
[facility]  Interventional Cardiology  Consult Note    Patient Name: Jian Arrieta  MRN: [id number]  Admission Date: 3/12/2019  Hospital Length of Stay: 3 days  Code Status: Full Code   Attending Provider: SELENA Castillo MD  Consulting Provider: Abdifatah Perera MD  Primary Care Physician: Samir Mahmood MD  Principal Problem:Ascites    Patient information was obtained from patient and ER records.     Inpatient consult to Interventional Cardiology  Consult performed by: Abdifatah Perera MD  Consult ordered by: Jake Escobar MD        Subjective:     Chief Complaint:  NSTEMi     HPI:   62yo male interventional cardiology consulted for NSTEMi in the setting of AF RVR rate 150's with peak trop 3. Has been having stable angina CCS III angina for the past 3 months.   His chest pain post IR biopsy and in AF RVR was described as substernal pressure and tightening, same as his angina at home but worse in severity. He is a diabetic on insulin and former  quit smoking 15 years ago and has a 15 year pack hx. He had multiple angiograms and ANNALEE in the past and follows at , had CABG x3 in 2017 and has not had an angiogram since.    Cirrhosis, ICM EF 30% with history of PCI and CABG with unknown anatomy, HTN, HLD, DM, BERHANE, and alcohol use c/b by pancreatitis/pseudocyst. Hepatology is being consulted due to ongoing ascites.     He was initially admitted on 3/13/19 due to shortness of breath and lower extremity edema. On admission Na was 130 and he had an ELIEZER (Cr 1.6), CTA with no PE, but ground-glass attenuation of the lungs, large volume abdominal ascites, and persistent air and fluid collection along the body of the pancreas suggestive of pancreatic necrosis or pseudocyst.                 Past Medical History:   Diagnosis Date    Alcohol abuse     Anasarca 1/28/2019    Arthropathy associated with neurological disorder 9/2/2015    Atherosclerosis     Charcot foot due to  diabetes mellitus     Chronic combined systolic and diastolic heart failure 01/29/2019 1-28-19 Left VentricleModerate decreased ejection fraction at 30%. Normal left ventricular cavity size. Normal wall thickness observed. Grade I (mild) left ventricular diastolic dysfunction consistent with impaired relaxation. Normal left atrial pressure. Moderate, global hypokinesis(see wall scoring diagram). Right VentricleNormal cavity size, wall thickness and ejection fraction. Wall motion n    Chronic pancreatitis 1/28/2019    Cirrhosis of liver with ascites 1/28/2019    Colon polyps     approx 5 yrs ago    Coronary artery disease due to calcified coronary lesion 05/08/2015    5 stents on ASA      Diabetic polyneuropathy associated with type 2 diabetes mellitus 9/2/2015    Diverticulosis 1/28/2019    DM type 2 with diabetic peripheral neuropathy 2/4/2019    Essential hypertension 1/28/2019    Former smoker 8/26/2015    Healed ulcer of left foot on examination 6/20/2017    Hydrocele     approx 1.5 yrs ago    Hypoalbuminemia 2/4/2019    Lymphedema of both lower extremities 1/29/2019    Mixed hyperlipidemia 5/8/2015    Morbid obesity with BMI of 50.0-59.9, adult 5/8/2015    Onychomycosis of multiple toenails with type 2 diabetes mellitus and peripheral neuropathy 6/20/2017    Other cirrhosis of liver     1-28-19 Liver has a cirrhotic morphology with no focal lesions.  Significant interval increase in ascites when compared to prior exam which may account for patient's abdominal distension.  Hypodense air-fluid collection along the body of the pancreas which is slightly smaller when compared to prior CT.  Findings may relate to pancreatic necrosis with pancreatic pseudocysts with infected pseudocyst    Perianal cyst     approx 2 yrs ago    Pseudocyst of pancreas 1/28/2019 1-28-19 Liver has a cirrhotic morphology with no focal lesions.  Significant interval increase in ascites when compared to prior exam  which may account for patient's abdominal distension.  Hypodense air-fluid collection along the body of the pancreas which is slightly smaller when compared to prior CT.  Findings may relate to pancreatic necrosis with pancreatic pseudocysts with infected pseudocyst    Skin cancer     skin cancer    Sleep apnea 8/26/2015    Status post bariatric surgery 1/11/2016    Type 2 diabetes mellitus, with long-term current use of insulin 5/8/2015       Past Surgical History:   Procedure Laterality Date    ANGIOPLASTY      total x5 stents    COLONOSCOPY N/A 10/6/2015    Performed by Shekhar Richards MD at [facility] (2ND FLR)    CORONARY ARTERY BYPASS GRAFT  2017    x3    CYST REMOVAL      GASTRECTOMY      GASTRECTOMY-SLEEVE-LAPAROSCOPIC - 86662 N/A 12/22/2015    Performed by Micheal Villavicencio Jr., MD at [facility] OR 2ND FLR    KNEE ARTHROSCOPY      perianal surgery      perianal cyst removed       Review of patient's allergies indicates:  No Known Allergies    PTA Medications   Medication Sig    aspirin 325 MG tablet Take 81 mg by mouth once daily.    atorvastatin (LIPITOR) 40 MG tablet Take 1 tablet (40 mg total) by mouth once daily.    cyanocobalamin, vitamin B-12, 500 mcg Subl Place 1 tablet under the tongue every Monday.     furosemide (LASIX) 40 MG tablet Take 1 tablet (40 mg total) by mouth once daily.    insulin aspart U-100 (NOVOLOG) 100 unit/mL injection Inject 4 Units into the skin 3 (three) times daily before meals.    insulin detemir (LEVEMIR FLEXPEN SUBQ) Inject 12 Units into the skin once daily.     lipase-protease-amylase (CREON) 36,000-114,000- 180,000 unit CpDR Take 1 capsule by mouth 3 (three) times daily.    metoprolol succinate (TOPROL-XL) 100 MG 24 hr tablet Take 1 tablet (100 mg total) by mouth once daily.    omeprazole (PRILOSEC) 40 MG capsule Take 40 mg by mouth once daily.    ONETOUCH ULTRA TEST Strp 4 (four) times daily. Use to test blood sugar four times a day.    ramipril (ALTACE) 2.5  MG capsule Take 1 capsule (2.5 mg total) by mouth once daily.    spironolactone (ALDACTONE) 100 MG tablet Take 1 tablet (100 mg total) by mouth once daily.    tamsulosin (FLOMAX) 0.4 mg Cap Take 1 capsule by mouth once daily. Pt has not started taking as of 19.    vitamin D (VITAMIN D3) 1000 units Tab Take 1 tablet (1,000 Units total) by mouth once daily.     Family History     Problem Relation (Age of Onset)    Cancer Mother, Father, Paternal Grandfather, Brother    Diabetes Maternal Grandmother    Heart disease Father    No Known Problems Paternal Grandmother    Obesity Sister    Parkinsonism Brother    Stroke Maternal Grandfather        Tobacco Use    Smoking status: Former Smoker     Packs/day: 2.00     Years: 30.00     Pack years: 60.00     Types: Cigarettes     Last attempt to quit: 2005     Years since quittin.1    Smokeless tobacco: Never Used   Substance and Sexual Activity    Alcohol use: No     Comment: started ~, reports 1 shot daily, max 3 shots daily, vague about alcohol consumption. Last drink 2018    Drug use: No    Sexual activity: Not on file     Review of Systems   All other systems reviewed and are negative.    Objective:     Vital Signs (Most Recent):  Temp: 99.1 °F (37.3 °C) (19 0343)  Pulse: 105 (19 0343)  Resp: 17 (19 0343)  BP: 112/61 (19 0343)  SpO2: 97 % (19 0343) Vital Signs (24h Range):  Temp:  [97.8 °F (36.6 °C)-99.1 °F (37.3 °C)] 99.1 °F (37.3 °C)  Pulse:  [] 105  Resp:  [14-17] 17  SpO2:  [95 %-98 %] 97 %  BP: (103-117)/(55-70) 112/61     Weight: 134.6 kg (296 lb 13.6 oz)  Body mass index is 46.49 kg/m².    SpO2: 97 %  O2 Device (Oxygen Therapy): room air      Intake/Output Summary (Last 24 hours) at 3/18/2019 0705  Last data filed at 3/18/2019 0200  Gross per 24 hour   Intake 576.16 ml   Output 925 ml   Net -348.84 ml       Lines/Drains/Airways     Peripherally Inserted Central Catheter Line                 PICC Double  Lumen 03/15/19 1733 right brachial 2 days          Peripheral Intravenous Line                 Peripheral IV - Single Lumen 03/15/19 1340 Anterior;Distal;Right Upper Arm 2 days                Physical Exam   Constitutional: He is oriented to person, place, and time. He appears well-developed and well-nourished. No distress.   Eyes: No scleral icterus.   Cardiovascular: Normal rate, regular rhythm and normal heart sounds.   No murmur heard.  Pulses:       Radial pulses are 2+ on the right side, and 2+ on the left side.        Femoral pulses are 2+ on the right side, and 2+ on the left side.  Pulmonary/Chest: Effort normal and breath sounds normal. No respiratory distress. He has no wheezes. He has no rales.   Abdominal: Soft. There is no tenderness. There is no rebound and no guarding.   Musculoskeletal: He exhibits edema.   3+ edema above and below the knee b/l   Neurological: He is alert and oriented to person, place, and time.   Skin: Skin is warm. He is not diaphoretic.   Psychiatric: He has a normal mood and affect.       Significant Labs: All pertinent lab results from the last 24 hours have been reviewed.    Significant Imaging: EKG: Sinus tachycardia left axis deviation, PVC, low voltage     Assessment and Plan:     Paroxysmal atrial fibrillation    [id number] at least 4, agree with anticoagulation for CVA prophylaxis. Currently in sinus rhythm.      NSTEMI (non-ST elevated myocardial infarction)    64 y/o M with multiple PCI, and CABG x3 in 2017 here with 3 months of CCSIII stable angina, during this hospitalization post IR biopsy he had AF RVR (new onset) and developed NSTEMi with trop peak at 3. Will proceed to angiogram tomorrow. Spoke with hepatology service, no contraindication to DAPT for 6 months to 1 year, no major surgeries planned as of now.    -Agree with Heparin gtt for ACS 48-72 hours in duration  -DAPT with ASA and Brillinta   -High intensity statin if okay from hepatology perspective    -Glycemic control   -BP < 130/80  -Keep NPO after midnight     Please obtain outside hospital OR report and cath films        Anemia    Anemia likely secondary to chronic disease      DM type 2 with diabetic peripheral neuropathy    Recommend tight glycemic control      Chronic combined systolic and diastolic heart failure    Ef reported as 30% recommend HF therapy with ACE-I and beta blocker such as coreg BID or Metoprolol Succinate daily.     Essential hypertension    Recommend BP < 130/80     Coronary artery disease due to calcified coronary lesion    See NSTEMi         VTE Risk Mitigation (From admission, onward)        Ordered     heparin 25,000 units in dextrose 5% 250 mL (100 units/mL) infusion LOW INTENSITY nomogram - [facility]  Continuous      03/16/19 0657     heparin 25,000 units in dextrose 5% (100 units/ml) IV bolus from bag - ADDITIONAL PRN BOLUS - 60 units/kg (max bolus 4000 units)  As needed (PRN)      03/16/19 0355     heparin 25,000 units in dextrose 5% (100 units/ml) IV bolus from bag - ADDITIONAL PRN BOLUS - 30 units/kg (max bolus 4000 units)  As needed (PRN)      03/16/19 0355     IP VTE HIGH RISK PATIENT  Once      03/13/19 1409     Place sequential compression device  Until discontinued      03/13/19 1409     Place BRANNON hose  Until discontinued      03/13/19 0310     Place sequential compression device  Until discontinued      03/13/19 0310          Thank you for your consult. I will follow-up with patient. Please contact us if you have any additional questions.    Abdifatah Perera MD  Interventional Cardiology   [facility]

## 2019-03-18 NOTE — ASSESSMENT & PLAN NOTE
62 y/o M with multiple PCI, and CABG x3 in 2017 here with 3 months of CCSIII stable angina, during this hospitalization post IR biopsy he had AF RVR (new onset) and developed NSTEMi with trop peak at 3. Will proceed to angiogram tomorrow. Spoke with hepatology service, no contraindication to DAPT for 6 months to 1 year, no major surgeries planned as of now.    -Agree with Heparin gtt for ACS 48-72 hours in duration  -DAPT with ASA and Brillinta   -High intensity statin if okay from hepatology perspective   -Glycemic control   -BP < 130/80  -Keep NPO after midnight

## 2019-03-18 NOTE — PROGRESS NOTES
Hospital Medicine  Progress Note      Patient Name: Jian Arrieta  MRN:  [id number]  [facility] Medicine Team: [facility] V Lorie Higuera MD  Date of Admission:  3/12/2019     Length of Stay:  LOS: 3 days   Expected Discharge Date: 3/20/2019  Principal Problem:  Ascites      Subjective:    Interval History/Overnight Events:  No acute events overnight.  NPO at midnight for cath with Cardiology antonio.  Family reports no abdominal pain, but feel like his abdominal distension is worse.  Leg swelling markedly improved.  Requesting for weekly paracenteses.      Review of Systems:  Constitutional: Negative for chills, fatigue, fever.   Respiratory: Negative for cough, shortness of breath.    Cardiovascular: + leg swelling.   Gastrointestinal: Negative for abdominal pain, constipation, diarrhea, nausea, vomiting.   Genitourinary: Negative for dysuria, frequency.   All other systems reviewed and are negative.      Objective:    Physical Exam:  Temp:  [96.8 °F (36 °C)-99.1 °F (37.3 °C)]   Pulse:  []   Resp:  [14-18]   BP: (100-117)/(50-70)   SpO2:  [97 %-99 %]     Intake/Output Summary (Last 24 hours) at 3/18/2019 1039  Last data filed at 3/18/2019 0200  Gross per 24 hour   Intake 576.16 ml   Output 925 ml   Net -348.84 ml       Constitutional: Appears well-developed and well-nourished.   Eyes: No scleral icterus or eye discharge  Cardiovascular: Normal heart rate.  Regular heart rhythm.  No murmur heard.  Pulmonary/Chest: Effort normal and breath sounds normal. No respiratory distress. No wheezes, rales, or rhonchi  Abdominal: Soft. Bowel sounds are normal.  Distension.  Fluid wave.  No tenderness  Musculoskeletal: No deformities.  BLE edema with chronic venous stasis  Neurological: Alert.  Oriented to person, place, and time.   Skin: Skin is warm and dry.       Assessment and Plan:    Mr. Jian Arrieta is a 63 y.o. male who presented to [facility] on 3/12/2019 with ascites.    Hospital Course:    Mr. Jian Arrieta was  admitted to Hospital Medicine for management of his ascites.  Liver ultrasound was performed which showed a cirrhotic liver with sequelae of portal hypertension, including severe volume ascites and splenomegaly.  Hepatology was consulted.  He had a paracentesis 3/13 that removed 10.8L, and another paracentesis on 3/15 that removed an additional 6L.  Transjugular liver biopsy was performed on 3/15, path pending.  After his procedure, he had an episode of chest pain with new afib with RVR.  He spontaneously converted. Troponin peaked at 3.  Cardiology was consulted and he was started on ACS protocol.  They believe that his troponin was elevated 2/2 demand ischemia from his afib.  He is tentatively scheduled to get a cath on 3/19.    Ascites  Alcoholic Cirrhosis of Liver with Ascites  MELD-Na score: 11 at 3/18/2019  3:37 AM  MELD score: 11 at 3/18/2019  3:37 AM  Calculated from:  Serum Creatinine: 1.3 mg/dL at 3/18/2019  3:37 AM  Serum Sodium: 136 mmol/L at 3/18/2019  3:37 AM  Total Bilirubin: 0.7 mg/dL (Rounded to 1 mg/dL) at 3/18/2019  3:37 AM  INR(ratio): 1.2 at 3/18/2019  3:37 AM  · Age: 63 years   · Paracentesis 3/13 removed 10.8L and another paracentesis on 3/15 that removed an additional 6L.    · Transjugular liver biopsy was performed on 3/15, path pending  · Continue Lasix 40mg PO daily  · Continue Aldactone 100mg PO daily    NSTEMI  · Per Cardiology likely 2/2 demand ischemia from afib  · Troponin peaked at 3  · Contiune Heparin gtt  · Continue Aspirin 81mg PO daily  · Continue Brilinta 90mg PO BID  · Continue Lipitor 40mg PO daily  · Continue Metoprolol 50mg PO BID  · Prn Nitro for chest pain  · Continue tele  · Check 2D echo  · Interventional Cards consulted, appreciate assistance.  Plan for cath 3/19    CAD  · Reports a history of 5 stents with CABG at  in 2017  · Scheduled for PET stress next week to evaluate patency  · Medications as above    Paroxysmal AFib  · Likely hypovolemic in origin 2/2 large  volume paracentesis x 2  · [id number] score: 3  · Continue tele  · Continue Heparin gtt.  Per Hepatology, no preference for NOAC/Warfarin after procedure.  Will need to discuss risk/benefit of bleeding as he is also on Aspirin  · Continue Metoprolol 50mg PO BID   · Cards consulted, appreciate assistance  · Start Amio 400mg PO BID x 14 days then 200mg PO daily thereafter    Chronic Pancreatitis  · Chronic and stable  · Continue enzyme replacememnt    Essential HTN  · Baseline BP 90-100s, given cirrhosis BP unlikely to be higher  · Continue Metoprolol 50mg PO BID  · Continue Lasix 40mg PO daily  · Continue Aldactone 100mg PO daily  · Can consider restarting Ramipril post cath    Chronic Combined Systolic and Diastolic Heart Failure  · Chronic and stable  · Continue Metoprolol 50mg PO BID  · Continue Lasix 40mg PO daily  · Continue Aldactone 100mg PO daily    Type 2 DM with Long Term Use of Insulin and Diabetic Peripheral Neuropathy  · HbA1c 6.9 in Jan 2019, will repeat  · Home DM regimen:  Aspart 4 units TIDWM and Detemir 12 units daily  · Contiune Detemir 7 units  · SSI with POCT accuchecks and Diabetic diet    Anemia  · Hgb 9, stable for now  · S/p 1 unit PRBCs  · Goal hemoglobin 9 given CAD  · Monitor for bleeding with DAPT in the setting of liver disease    Morbid Obesity  · Body mass index is 46.49 kg/m².  · Encourage diet, weight loss - can't exercise 2/2 LE edema      Diet:  Low Sodium then NPO at midnight for cath  GI PPx:  PPI  DVT PPx:  Heparin gtt  Goals of Care:  Full    High Risk Conditions:  Patient is currently on drug therapy requiring intensive monitoring for toxicity: Heparin gtt     Disposition:  Pending Cards recs likely home 3/20  Discharge Needs:  Cards follow up outpatient for afib and CAD.  Hepatology follow up for outpatient para.  HH PT.    Lorie Higuera MD  [facility]  Cell:  [phone]  Spectra:  [phone]  Pager:  [phone]

## 2019-03-18 NOTE — PT/OT/SLP PROGRESS
Physical Therapy Treatment    Patient Name:  Jian Arrieta   MRN:  [id number]    Recommendations:     Discharge Recommendations:  home health PT   Discharge Equipment Recommendations: none   Barriers to discharge: None    Assessment:     Jian Arrieta is a 63 y.o. male admitted with a medical diagnosis of Ascites.  He presents with the following impairments/functional limitations:  weakness, impaired functional mobilty, decreased safety awareness, impaired endurance, impaired self care skills, gait instability. Pt performs all mobility with supervision and VC to increase safety. Pt progressing and would benefit from continued skilled acute PT 3x/wk to improve functional mobility.  Recommending pt receive PT services in HHPT setting following discharge from hospital once medically cleared.      Rehab Prognosis: Fair; patient would benefit from acute skilled PT services to address these deficits and reach maximum level of function.    Recent Surgery: Procedure(s) (LRB):  ANGIOGRAM, CORONARY ARTERY (Right) Day of Surgery    Plan:     During this hospitalization, patient to be seen 3 x/week to address the identified rehab impairments via gait training, therapeutic activities, therapeutic exercises, neuromuscular re-education and progress toward the following goals:    · Plan of Care Expires:  04/14/19    Subjective     Chief Complaint: none noted  Patient/Family Comments/goals: pt voices he is ready to d/c home. Pt states he does not amb much at home and feels he is currently able to manage his needs if discharged home.   Pain/Comfort:  · Pain Rating 1: 0/10      Objective:     Communicated with RN prior to session.  Patient found all lines intact, call button in reach and wife present peripheral IV  upon PT entry to room.     General Precautions: Standard, fall, aspiration   Orthopedic Precautions:N/A   Braces: N/A     Functional Mobility:  · Bed Mobility:     · Rolling Right: supervision  · Scooting:  "supervision  · Supine to Sit: supervision  · Transfers:     · Sit to Stand:  supervision with no AD  · Gait: pt amb 16' with no AD and supervision. pt states "I don't see no chair" after entering hallway and request to return to bed. pt amb 8' to couch.   · Balance: Sitting: Supervision     Standing: Supervision      AM-PAC 6 CLICK MOBILITY  Turning over in bed (including adjusting bedclothes, sheets and blankets)?: 3  Sitting down on and standing up from a chair with arms (e.g., wheelchair, bedside commode, etc.): 3  Moving from lying on back to sitting on the side of the bed?: 3  Moving to and from a bed to a chair (including a wheelchair)?: 3  Need to walk in hospital room?: 3  Climbing 3-5 steps with a railing?: 3  Basic Mobility Total Score: 18       Therapeutic Activities and Exercises:   -Pt educated on:   -PT roles, expectations, and POC    -Safety with mobility   -Benefits of OOB activities to increase strength and functional mobility    -Performing ther ex for increasing LE ROM and strength   -Discharge recommendations      Patient left sitting on couch per request with all lines intact and wife present..    GOALS:   Multidisciplinary Problems     Physical Therapy Goals        Problem: Physical Therapy Goal    Goal Priority Disciplines Outcome Goal Variances Interventions   Physical Therapy Goal     PT, PT/OT Ongoing (interventions implemented as appropriate)     Description:  Goals to be met by: 3/24/19     Patient will increase functional independence with mobility by performin. Supine to sit with Modified [place]  2. Gait  x 100 feet with Supervision using Rolling Walker or LRD.   3. Ascend/descend 3 stair with right Handrails Contact Guard Assistance using Rolling Walker or LRD.   4. Stand for 10 minutes with Contact Guard Assistance using Rolling Walker or LRD to prepare for standing ADLs.                         Time Tracking:     PT Received On: 19  PT Start Time: 954     PT Stop " Time: 1010  PT Total Time (min): 16 min     Billable Minutes: Therapeutic Activity 16    Treatment Type: Treatment  PT/PTA: PT     PTA Visit Number: 0     Selvin Daley, PT  03/18/2019

## 2019-03-18 NOTE — PLAN OF CARE
03/18/19 1537   Post-Acute Status   Post-Acute Authorization Home Health/Hospice   Home Health/Hospice Status Referrals Sent     Pt is current with [facility].  The SW sent [place] his H&P and fs.   will send orders once written.    Maryse Ortez, GINO x [phone]

## 2019-03-18 NOTE — SUBJECTIVE & OBJECTIVE
Past Medical History:   Diagnosis Date    Alcohol abuse     Anasarca 1/28/2019    Arthropathy associated with neurological disorder 9/2/2015    Atherosclerosis     Charcot foot due to diabetes mellitus     Chronic combined systolic and diastolic heart failure 01/29/2019 1-28-19 Left VentricleModerate decreased ejection fraction at 30%. Normal left ventricular cavity size. Normal wall thickness observed. Grade I (mild) left ventricular diastolic dysfunction consistent with impaired relaxation. Normal left atrial pressure. Moderate, global hypokinesis(see wall scoring diagram). Right VentricleNormal cavity size, wall thickness and ejection fraction. Wall motion n    Chronic pancreatitis 1/28/2019    Cirrhosis of liver with ascites 1/28/2019    Colon polyps     approx 5 yrs ago    Coronary artery disease due to calcified coronary lesion 05/08/2015    5 stents on ASA      Diabetic polyneuropathy associated with type 2 diabetes mellitus 9/2/2015    Diverticulosis 1/28/2019    DM type 2 with diabetic peripheral neuropathy 2/4/2019    Essential hypertension 1/28/2019    Former smoker 8/26/2015    Healed ulcer of left foot on examination 6/20/2017    Hydrocele     approx 1.5 yrs ago    Hypoalbuminemia 2/4/2019    Lymphedema of both lower extremities 1/29/2019    Mixed hyperlipidemia 5/8/2015    Morbid obesity with BMI of 50.0-59.9, adult 5/8/2015    Onychomycosis of multiple toenails with type 2 diabetes mellitus and peripheral neuropathy 6/20/2017    Other cirrhosis of liver     1-28-19 Liver has a cirrhotic morphology with no focal lesions.  Significant interval increase in ascites when compared to prior exam which may account for patient's abdominal distension.  Hypodense air-fluid collection along the body of the pancreas which is slightly smaller when compared to prior CT.  Findings may relate to pancreatic necrosis with pancreatic pseudocysts with infected pseudocyst    Perianal cyst      approx 2 yrs ago    Pseudocyst of pancreas 1/28/2019 1-28-19 Liver has a cirrhotic morphology with no focal lesions.  Significant interval increase in ascites when compared to prior exam which may account for patient's abdominal distension.  Hypodense air-fluid collection along the body of the pancreas which is slightly smaller when compared to prior CT.  Findings may relate to pancreatic necrosis with pancreatic pseudocysts with infected pseudocyst    Skin cancer     skin cancer    Sleep apnea 8/26/2015    Status post bariatric surgery 1/11/2016    Type 2 diabetes mellitus, with long-term current use of insulin 5/8/2015       Past Surgical History:   Procedure Laterality Date    ANGIOPLASTY      total x5 stents    COLONOSCOPY N/A 10/6/2015    Performed by Shekhar Richards MD at [facility] ENDO (2ND FLR)    CORONARY ARTERY BYPASS GRAFT  2017    x3    CYST REMOVAL      GASTRECTOMY      GASTRECTOMY-SLEEVE-LAPAROSCOPIC - 44438 N/A 12/22/2015    Performed by Micheal Villavicencio Jr., MD at [facility] OR 2ND FLR    KNEE ARTHROSCOPY      perianal surgery      perianal cyst removed       Review of patient's allergies indicates:  No Known Allergies    PTA Medications   Medication Sig    aspirin 325 MG tablet Take 81 mg by mouth once daily.    atorvastatin (LIPITOR) 40 MG tablet Take 1 tablet (40 mg total) by mouth once daily.    cyanocobalamin, vitamin B-12, 500 mcg Subl Place 1 tablet under the tongue every Monday.     furosemide (LASIX) 40 MG tablet Take 1 tablet (40 mg total) by mouth once daily.    insulin aspart U-100 (NOVOLOG) 100 unit/mL injection Inject 4 Units into the skin 3 (three) times daily before meals.    insulin detemir (LEVEMIR FLEXPEN SUBQ) Inject 12 Units into the skin once daily.     lipase-protease-amylase (CREON) 36,000-114,000- 180,000 unit CpDR Take 1 capsule by mouth 3 (three) times daily.    metoprolol succinate (TOPROL-XL) 100 MG 24 hr tablet Take 1 tablet (100 mg total) by mouth once daily.     omeprazole (PRILOSEC) 40 MG capsule Take 40 mg by mouth once daily.    ONETOUCH ULTRA TEST Strp 4 (four) times daily. Use to test blood sugar four times a day.    ramipril (ALTACE) 2.5 MG capsule Take 1 capsule (2.5 mg total) by mouth once daily.    spironolactone (ALDACTONE) 100 MG tablet Take 1 tablet (100 mg total) by mouth once daily.    tamsulosin (FLOMAX) 0.4 mg Cap Take 1 capsule by mouth once daily. Pt has not started taking as of 19.    vitamin D (VITAMIN D3) 1000 units Tab Take 1 tablet (1,000 Units total) by mouth once daily.     Family History     Problem Relation (Age of Onset)    Cancer Mother, Father, Paternal Grandfather, Brother    Diabetes Maternal Grandmother    Heart disease Father    No Known Problems Paternal Grandmother    Obesity Sister    Parkinsonism Brother    Stroke Maternal Grandfather        Tobacco Use    Smoking status: Former Smoker     Packs/day: 2.00     Years: 30.00     Pack years: 60.00     Types: Cigarettes     Last attempt to quit: 2005     Years since quittin.1    Smokeless tobacco: Never Used   Substance and Sexual Activity    Alcohol use: No     Comment: started ~, reports 1 shot daily, max 3 shots daily, vague about alcohol consumption. Last drink 2018    Drug use: No    Sexual activity: Not on file     Review of Systems   All other systems reviewed and are negative.    Objective:     Vital Signs (Most Recent):  Temp: 99.1 °F (37.3 °C) (19 0343)  Pulse: 105 (19 0343)  Resp: 17 (19 0343)  BP: 112/61 (19 0343)  SpO2: 97 % (19 0343) Vital Signs (24h Range):  Temp:  [97.8 °F (36.6 °C)-99.1 °F (37.3 °C)] 99.1 °F (37.3 °C)  Pulse:  [] 105  Resp:  [14-17] 17  SpO2:  [95 %-98 %] 97 %  BP: (103-117)/(55-70) 112/61     Weight: 134.6 kg (296 lb 13.6 oz)  Body mass index is 46.49 kg/m².    SpO2: 97 %  O2 Device (Oxygen Therapy): room air      Intake/Output Summary (Last 24 hours) at 3/18/2019 0708  Last data filed at  3/18/2019 0200  Gross per 24 hour   Intake 576.16 ml   Output 925 ml   Net -348.84 ml       Lines/Drains/Airways     Peripherally Inserted Central Catheter Line                 PICC Double Lumen 03/15/19 1733 right brachial 2 days          Peripheral Intravenous Line                 Peripheral IV - Single Lumen 03/15/19 1340 Anterior;Distal;Right Upper Arm 2 days                Physical Exam   Constitutional: He is oriented to person, place, and time. He appears well-developed and well-nourished. No distress.   Eyes: No scleral icterus.   Cardiovascular: Normal rate, regular rhythm and normal heart sounds.   No murmur heard.  Pulses:       Radial pulses are 2+ on the right side, and 2+ on the left side.        Femoral pulses are 2+ on the right side, and 2+ on the left side.  Pulmonary/Chest: Effort normal and breath sounds normal. No respiratory distress. He has no wheezes. He has no rales.   Abdominal: Soft. There is no tenderness. There is no rebound and no guarding.   Musculoskeletal: He exhibits edema.   3+ edema above and below the knee b/l   Neurological: He is alert and oriented to person, place, and time.   Skin: Skin is warm. He is not diaphoretic.   Psychiatric: He has a normal mood and affect.       Significant Labs: All pertinent lab results from the last 24 hours have been reviewed.    Significant Imaging: EKG: Sinus tachycardia left axis deviation, PVC, low voltage

## 2019-03-19 LAB
ABO + RH BLD: NORMAL
ALBUMIN SERPL BCP-MCNC: 2.8 G/DL
ALP SERPL-CCNC: 68 U/L
ALT SERPL W/O P-5'-P-CCNC: 13 U/L
ANION GAP SERPL CALC-SCNC: 3 MMOL/L
APTT BLDCRRT: 46.2 SEC
AST SERPL-CCNC: 15 U/L
BACTERIA BLD CULT: NORMAL
BASOPHILS # BLD AUTO: 0.03 K/UL
BASOPHILS NFR BLD: 0.6 %
BILIRUB SERPL-MCNC: 0.7 MG/DL
BLD GP AB SCN CELLS X3 SERPL QL: NORMAL
BLD PROD TYP BPU: NORMAL
BLOOD UNIT EXPIRATION DATE: NORMAL
BLOOD UNIT TYPE CODE: 5100
BLOOD UNIT TYPE: NORMAL
BUN SERPL-MCNC: 22 MG/DL
CALCIUM SERPL-MCNC: 8 MG/DL
CHLORIDE SERPL-SCNC: 105 MMOL/L
CO2 SERPL-SCNC: 28 MMOL/L
CODING SYSTEM: NORMAL
CREAT SERPL-MCNC: 1.2 MG/DL
DIFFERENTIAL METHOD: ABNORMAL
DISPENSE STATUS: NORMAL
EOSINOPHIL # BLD AUTO: 0.1 K/UL
EOSINOPHIL NFR BLD: 2.4 %
ERYTHROCYTE [DISTWIDTH] IN BLOOD BY AUTOMATED COUNT: 15.9 %
EST. GFR  (AFRICAN AMERICAN): >60 ML/MIN/1.73 M^2
EST. GFR  (NON AFRICAN AMERICAN): >60 ML/MIN/1.73 M^2
GLUCOSE SERPL-MCNC: 194 MG/DL
HCT VFR BLD AUTO: 27.8 %
HGB BLD-MCNC: 9 G/DL
IMM GRANULOCYTES # BLD AUTO: 0.02 K/UL
IMM GRANULOCYTES NFR BLD AUTO: 0.4 %
INR PPP: 1.2
LYMPHOCYTES # BLD AUTO: 1.3 K/UL
LYMPHOCYTES NFR BLD: 27.8 %
MCH RBC QN AUTO: 28.6 PG
MCHC RBC AUTO-ENTMCNC: 32.4 G/DL
MCV RBC AUTO: 88 FL
MONOCYTES # BLD AUTO: 0.4 K/UL
MONOCYTES NFR BLD: 9.4 %
NEUTROPHILS # BLD AUTO: 2.8 K/UL
NEUTROPHILS NFR BLD: 59.4 %
NRBC BLD-RTO: 0 /100 WBC
NUM UNITS TRANS PACKED RBC: NORMAL
PLATELET # BLD AUTO: 167 K/UL
PMV BLD AUTO: 9.6 FL
POCT GLUCOSE: 165 MG/DL (ref 70–110)
POCT GLUCOSE: 209 MG/DL (ref 70–110)
POCT GLUCOSE: 216 MG/DL (ref 70–110)
POCT GLUCOSE: 416 MG/DL (ref 70–110)
POTASSIUM SERPL-SCNC: 3.8 MMOL/L
PROT SERPL-MCNC: 4.5 G/DL
PROTHROMBIN TIME: 11.9 SEC
RBC # BLD AUTO: 3.15 M/UL
SODIUM SERPL-SCNC: 136 MMOL/L
WBC # BLD AUTO: 4.67 K/UL

## 2019-03-19 PROCEDURE — P9047 ALBUMIN (HUMAN), 25%, 50ML: HCPCS | Mod: JG | Performed by: HOSPITALIST

## 2019-03-19 PROCEDURE — 25000003 PHARM REV CODE 250: Performed by: NURSE PRACTITIONER

## 2019-03-19 PROCEDURE — 85025 COMPLETE CBC W/AUTO DIFF WBC: CPT

## 2019-03-19 PROCEDURE — 11000001 HC ACUTE MED/SURG PRIVATE ROOM

## 2019-03-19 PROCEDURE — 99233 SBSQ HOSP IP/OBS HIGH 50: CPT | Mod: ,,, | Performed by: HOSPITALIST

## 2019-03-19 PROCEDURE — 63600175 PHARM REV CODE 636 W HCPCS: Performed by: PHYSICIAN ASSISTANT

## 2019-03-19 PROCEDURE — 25000003 PHARM REV CODE 250: Performed by: PHYSICIAN ASSISTANT

## 2019-03-19 PROCEDURE — 85730 THROMBOPLASTIN TIME PARTIAL: CPT

## 2019-03-19 PROCEDURE — 80053 COMPREHEN METABOLIC PANEL: CPT

## 2019-03-19 PROCEDURE — 85610 PROTHROMBIN TIME: CPT

## 2019-03-19 PROCEDURE — 25000003 PHARM REV CODE 250

## 2019-03-19 PROCEDURE — 86850 RBC ANTIBODY SCREEN: CPT

## 2019-03-19 PROCEDURE — 97530 THERAPEUTIC ACTIVITIES: CPT

## 2019-03-19 PROCEDURE — 25000003 PHARM REV CODE 250: Performed by: HOSPITALIST

## 2019-03-19 PROCEDURE — 63600175 PHARM REV CODE 636 W HCPCS: Mod: JG | Performed by: HOSPITALIST

## 2019-03-19 PROCEDURE — 99233 PR SUBSEQUENT HOSPITAL CARE,LEVL III: ICD-10-PCS | Mod: ,,, | Performed by: HOSPITALIST

## 2019-03-19 RX ORDER — ALBUMIN HUMAN 250 G/1000ML
25 SOLUTION INTRAVENOUS ONCE
Status: COMPLETED | OUTPATIENT
Start: 2019-03-19 | End: 2019-03-19

## 2019-03-19 RX ORDER — ALBUMIN HUMAN 250 G/1000ML
25 SOLUTION INTRAVENOUS EVERY 6 HOURS
Status: COMPLETED | OUTPATIENT
Start: 2019-03-19 | End: 2019-03-20

## 2019-03-19 RX ORDER — SODIUM CHLORIDE 9 MG/ML
INJECTION, SOLUTION INTRAVENOUS CONTINUOUS
Status: CANCELLED | OUTPATIENT
Start: 2019-03-19

## 2019-03-19 RX ORDER — DIPHENHYDRAMINE HCL 50 MG
50 CAPSULE ORAL EVERY 6 HOURS
Status: CANCELLED | OUTPATIENT
Start: 2019-03-19 | End: 2019-03-19

## 2019-03-19 RX ADMIN — PANCRELIPASE 3 CAPSULE: 60000; 12000; 38000 CAPSULE, DELAYED RELEASE PELLETS ORAL at 05:03

## 2019-03-19 RX ADMIN — INSULIN ASPART 5 UNITS: 100 INJECTION, SOLUTION INTRAVENOUS; SUBCUTANEOUS at 10:03

## 2019-03-19 RX ADMIN — INSULIN ASPART 2 UNITS: 100 INJECTION, SOLUTION INTRAVENOUS; SUBCUTANEOUS at 08:03

## 2019-03-19 RX ADMIN — TICAGRELOR 90 MG: 90 TABLET ORAL at 10:03

## 2019-03-19 RX ADMIN — ASPIRIN 81 MG CHEWABLE TABLET 81 MG: 81 TABLET CHEWABLE at 09:03

## 2019-03-19 RX ADMIN — METOPROLOL TARTRATE 50 MG: 50 TABLET ORAL at 10:03

## 2019-03-19 RX ADMIN — ALBUMIN (HUMAN) 25 G: 25 SOLUTION INTRAVENOUS at 04:03

## 2019-03-19 RX ADMIN — INSULIN ASPART 4 UNITS: 100 INJECTION, SOLUTION INTRAVENOUS; SUBCUTANEOUS at 05:03

## 2019-03-19 RX ADMIN — ATORVASTATIN CALCIUM 40 MG: 20 TABLET, FILM COATED ORAL at 09:03

## 2019-03-19 RX ADMIN — METOPROLOL TARTRATE 50 MG: 50 TABLET ORAL at 09:03

## 2019-03-19 RX ADMIN — FUROSEMIDE 40 MG: 40 TABLET ORAL at 09:03

## 2019-03-19 RX ADMIN — HEPARIN SODIUM AND DEXTROSE 12 UNITS/KG/HR: 10000; 5 INJECTION INTRAVENOUS at 03:03

## 2019-03-19 RX ADMIN — AMIODARONE HYDROCHLORIDE 400 MG: 200 TABLET ORAL at 10:03

## 2019-03-19 RX ADMIN — AMIODARONE HYDROCHLORIDE 400 MG: 200 TABLET ORAL at 09:03

## 2019-03-19 RX ADMIN — PANTOPRAZOLE SODIUM 40 MG: 40 TABLET, DELAYED RELEASE ORAL at 09:03

## 2019-03-19 RX ADMIN — SPIRONOLACTONE 100 MG: 100 TABLET, FILM COATED ORAL at 09:03

## 2019-03-19 RX ADMIN — TICAGRELOR 90 MG: 90 TABLET ORAL at 09:03

## 2019-03-19 RX ADMIN — ALBUMIN HUMAN 25 G: 0.25 SOLUTION INTRAVENOUS at 05:03

## 2019-03-19 NOTE — PROGRESS NOTES
Paracentesis complete, 5600 MLs removed.  100 additonal  Albumin administered per protocol. Dressing applied to RLQ puncture site, dressing clean dry and intact.Report called to Reyna, Inpatient nurse.

## 2019-03-19 NOTE — PLAN OF CARE
" was informed by Dr. Lorie Wayne of medical records needed by cardiology from [facility] prior to the patient having his cardiac cath done. Signed & completed  "Release of Information" form faxed to [facility]'s medical records dept (f) [phone]. Awaiting records. Patient scheduled to have another paracentesis done today. Will continue to follow.   "

## 2019-03-19 NOTE — SUBJECTIVE & OBJECTIVE
Interval Hx: Resting comfortably    Past Surgical History:   Procedure Laterality Date    ANGIOPLASTY      total x5 stents    COLONOSCOPY N/A 10/6/2015    Performed by Shekhar Richards MD at [facility] ENDO (2ND FLR)    CORONARY ARTERY BYPASS GRAFT  2017    x3    CYST REMOVAL      GASTRECTOMY      GASTRECTOMY-SLEEVE-LAPAROSCOPIC - 00026 N/A 12/22/2015    Performed by Micheal Villavicencio Jr., MD at [facility] OR 2ND FLR    KNEE ARTHROSCOPY      perianal surgery      perianal cyst removed       Review of patient's allergies indicates:  No Known Allergies    PTA Medications   Medication Sig    aspirin 325 MG tablet Take 81 mg by mouth once daily.    atorvastatin (LIPITOR) 40 MG tablet Take 1 tablet (40 mg total) by mouth once daily.    cyanocobalamin, vitamin B-12, 500 mcg Subl Place 1 tablet under the tongue every Monday.     furosemide (LASIX) 40 MG tablet Take 1 tablet (40 mg total) by mouth once daily.    insulin aspart U-100 (NOVOLOG) 100 unit/mL injection Inject 4 Units into the skin 3 (three) times daily before meals.    insulin detemir (LEVEMIR FLEXPEN SUBQ) Inject 12 Units into the skin once daily.     lipase-protease-amylase (CREON) 36,000-114,000- 180,000 unit CpDR Take 1 capsule by mouth 3 (three) times daily.    metoprolol succinate (TOPROL-XL) 100 MG 24 hr tablet Take 1 tablet (100 mg total) by mouth once daily.    omeprazole (PRILOSEC) 40 MG capsule Take 40 mg by mouth once daily.    ONETOUCH ULTRA TEST Strp 4 (four) times daily. Use to test blood sugar four times a day.    ramipril (ALTACE) 2.5 MG capsule Take 1 capsule (2.5 mg total) by mouth once daily.    spironolactone (ALDACTONE) 100 MG tablet Take 1 tablet (100 mg total) by mouth once daily.    tamsulosin (FLOMAX) 0.4 mg Cap Take 1 capsule by mouth once daily. Pt has not started taking as of 2/27/19.    vitamin D (VITAMIN D3) 1000 units Tab Take 1 tablet (1,000 Units total) by mouth once daily.     Family History     Problem Relation (Age of  Onset)    Cancer Mother, Father, Paternal Grandfather, Brother    Diabetes Maternal Grandmother    Heart disease Father    No Known Problems Paternal Grandmother    Obesity Sister    Parkinsonism Brother    Stroke Maternal Grandfather        Tobacco Use    Smoking status: Former Smoker     Packs/day: 2.00     Years: 30.00     Pack years: 60.00     Types: Cigarettes     Last attempt to quit: 2005     Years since quittin.1    Smokeless tobacco: Never Used   Substance and Sexual Activity    Alcohol use: No     Comment: started ~, reports 1 shot daily, max 3 shots daily, vague about alcohol consumption. Last drink 2018    Drug use: No    Sexual activity: Not on file     Review of Systems   All other systems reviewed and are negative.    Objective:     Vital Signs (Most Recent):  Temp: 97.6 °F (36.4 °C) (19)  Pulse: 75 (19)  Resp: 18 (19)  BP: 105/60 (19)  SpO2: 97 % (19) Vital Signs (24h Range):  Temp:  [96.2 °F (35.7 °C)-98 °F (36.7 °C)] 97.6 °F (36.4 °C)  Pulse:  [68-94] 75  Resp:  [18] 18  SpO2:  [97 %-100 %] 97 %  BP: ()/(50-64) 105/60     Weight: 134.6 kg (296 lb 13.6 oz)  Body mass index is 46.49 kg/m².    SpO2: 97 %  O2 Device (Oxygen Therapy): room air      Intake/Output Summary (Last 24 hours) at 3/19/2019 0728  Last data filed at 3/19/2019 0500  Gross per 24 hour   Intake 1003.31 ml   Output 1140 ml   Net -136.69 ml       Lines/Drains/Airways     Peripherally Inserted Central Catheter Line                 PICC Double Lumen 03/15/19 1733 right brachial 3 days          Peripheral Intravenous Line                 Peripheral IV - Single Lumen 03/15/19 1340 Anterior;Distal;Right Upper Arm 3 days                Physical Exam   Constitutional: He is oriented to person, place, and time. He appears well-developed and well-nourished. No distress.   Eyes: No scleral icterus.   Cardiovascular: Normal rate, regular rhythm and normal heart  sounds.   No murmur heard.  Pulses:       Radial pulses are 2+ on the right side, and 2+ on the left side.        Femoral pulses are 2+ on the right side, and 2+ on the left side.  Pulmonary/Chest: Effort normal and breath sounds normal. No respiratory distress. He has no wheezes. He has no rales.   Abdominal: Soft. There is no tenderness. There is no rebound and no guarding.   Musculoskeletal: He exhibits edema.   3+ edema above and below the knee b/l   Neurological: He is alert and oriented to person, place, and time.   Skin: Skin is warm. He is not diaphoretic.   Psychiatric: He has a normal mood and affect.       Significant Labs: All pertinent lab results from the last 24 hours have been reviewed.    Significant Imaging: EKG: Sinus tachycardia left axis deviation, PVC, low voltage

## 2019-03-19 NOTE — H&P
Inpatient Radiology Pre-procedure Note    History of Present Illness:  Jian Arrieta is a 63 y.o. male who presents for ultrasound guided paracentesis.  Admission H&P reviewed.  Past Medical History:   Diagnosis Date    Alcohol abuse     Anasarca 1/28/2019    Arthropathy associated with neurological disorder 9/2/2015    Atherosclerosis     Charcot foot due to diabetes mellitus     Chronic combined systolic and diastolic heart failure 01/29/2019 1-28-19 Left VentricleModerate decreased ejection fraction at 30%. Normal left ventricular cavity size. Normal wall thickness observed. Grade I (mild) left ventricular diastolic dysfunction consistent with impaired relaxation. Normal left atrial pressure. Moderate, global hypokinesis(see wall scoring diagram). Right VentricleNormal cavity size, wall thickness and ejection fraction. Wall motion n    Chronic pancreatitis 1/28/2019    Cirrhosis of liver with ascites 1/28/2019    Colon polyps     approx 5 yrs ago    Coronary artery disease due to calcified coronary lesion 05/08/2015    5 stents on ASA      Diabetic polyneuropathy associated with type 2 diabetes mellitus 9/2/2015    Diverticulosis 1/28/2019    DM type 2 with diabetic peripheral neuropathy 2/4/2019    Essential hypertension 1/28/2019    Former smoker 8/26/2015    Healed ulcer of left foot on examination 6/20/2017    Hydrocele     approx 1.5 yrs ago    Hypoalbuminemia 2/4/2019    Lymphedema of both lower extremities 1/29/2019    Mixed hyperlipidemia 5/8/2015    Morbid obesity with BMI of 50.0-59.9, adult 5/8/2015    Onychomycosis of multiple toenails with type 2 diabetes mellitus and peripheral neuropathy 6/20/2017    Other cirrhosis of liver     1-28-19 Liver has a cirrhotic morphology with no focal lesions.  Significant interval increase in ascites when compared to prior exam which may account for patient's abdominal distension.  Hypodense air-fluid collection along the body of the  pancreas which is slightly smaller when compared to prior CT.  Findings may relate to pancreatic necrosis with pancreatic pseudocysts with infected pseudocyst    Perianal cyst     approx 2 yrs ago    Pseudocyst of pancreas 1/28/2019 1-28-19 Liver has a cirrhotic morphology with no focal lesions.  Significant interval increase in ascites when compared to prior exam which may account for patient's abdominal distension.  Hypodense air-fluid collection along the body of the pancreas which is slightly smaller when compared to prior CT.  Findings may relate to pancreatic necrosis with pancreatic pseudocysts with infected pseudocyst    Skin cancer     skin cancer    Sleep apnea 8/26/2015    Status post bariatric surgery 1/11/2016    Type 2 diabetes mellitus, with long-term current use of insulin 5/8/2015     Past Surgical History:   Procedure Laterality Date    ANGIOPLASTY      total x5 stents    COLONOSCOPY N/A 10/6/2015    Performed by [facility]khar Richards MD at [facility] ENDO (2ND FLR)    CORONARY ARTERY BYPASS GRAFT  2017    x3    CYST REMOVAL      GASTRECTOMY      GASTRECTOMY-SLEEVE-LAPAROSCOPIC - 19794 N/A 12/22/2015    Performed by Micheal Villavicencio Jr., MD at [facility] OR 2ND FLR    KNEE ARTHROSCOPY      perianal surgery      perianal cyst removed       Review of Systems:   As documented in primary team H&P    Home Meds:   Prior to Admission medications    Medication Sig Start Date End Date Taking? Authorizing Provider   aspirin 325 MG tablet Take 81 mg by mouth once daily.   Yes Historical Provider, MD   atorvastatin (LIPITOR) 40 MG tablet Take 1 tablet (40 mg total) by mouth once daily. 2/4/19 2/4/20 Yes Alfonso Mahmood MD   cyanocobalamin, vitamin B-12, 500 mcg Subl Place 1 tablet under the tongue every Monday.    Yes Historical Provider, MD   furosemide (LASIX) 40 MG tablet Take 1 tablet (40 mg total) by mouth once daily. 2/4/19 2/4/20 Yes Alfonso Mahmood MD   insulin aspart U-100 (NOVOLOG) 100 unit/mL injection  Inject 4 Units into the skin 3 (three) times daily before meals.   Yes Historical Provider, MD   insulin detemir (LEVEMIR FLEXPEN SUBQ) Inject 12 Units into the skin once daily.    Yes Historical Provider, MD   lipase-protease-amylase (CREON) 36,000-114,000- 180,000 unit CpDR Take 1 capsule by mouth 3 (three) times daily. 2/4/19  Yes Alfonso Mahmood MD   metoprolol succinate (TOPROL-XL) 100 MG 24 hr tablet Take 1 tablet (100 mg total) by mouth once daily. 2/4/19 2/4/20 Yes Alfonso Mahmood MD   omeprazole (PRILOSEC) 40 MG capsule Take 40 mg by mouth once daily. 2/18/19  Yes Historical Provider, MD   ONETOUCH ULTRA TEST Strp 4 (four) times daily. Use to test blood sugar four times a day. 10/15/15  Yes Historical Provider, MD   ramipril (ALTACE) 2.5 MG capsule Take 1 capsule (2.5 mg total) by mouth once daily. 2/27/19  Yes Jaime Carney III, MD   spironolactone (ALDACTONE) 100 MG tablet Take 1 tablet (100 mg total) by mouth once daily. 2/4/19  Yes Alfonso Mahmood MD   tamsulosin (FLOMAX) 0.4 mg Cap Take 1 capsule by mouth once daily. Pt has not started taking as of 2/27/19. 2/5/19  Yes Historical Provider, MD   vitamin D (VITAMIN D3) 1000 units Tab Take 1 tablet (1,000 Units total) by mouth once daily. 1/31/19  Yes Jian Lambert MD     Scheduled Meds:    albumin human 25%  25 g Intravenous Q6H    amiodarone  400 mg Oral BID    And    [START ON 4/1/2019] amiodarone  200 mg Oral Daily    aspirin  81 mg Oral Daily    atorvastatin  40 mg Oral Daily    furosemide  40 mg Oral Daily    insulin detemir U-100  7 Units Subcutaneous Daily    lipase-protease-amylase 12,000-38,000-60,000 units  3 capsule Oral TID WM    metoprolol tartrate  50 mg Oral BID    pantoprazole  40 mg Oral Daily    spironolactone  100 mg Oral Daily    ticagrelor  90 mg Oral BID     Continuous Infusions:    sodium chloride 0.9%      heparin (porcine) in D5W 12 Units/kg/hr (03/19/19 1520)     PRN Meds:sodium chloride, sodium chloride,  acetaminophen, dextrose 50%, glucagon (human recombinant), glucose, glucose, heparin (PORCINE), heparin (PORCINE), insulin aspart U-100, metoprolol, nitroGLYCERIN, ondansetron, oxyCODONE, sodium chloride 0.9%  Anticoagulants/Antiplatelets: aspirin and Heparin    Allergies: Review of patient's allergies indicates:  No Known Allergies  Sedation Hx: have not been any systemic reactions    Labs:  Recent Labs   Lab 03/19/19  0443   INR 1.2       Recent Labs   Lab 03/19/19  0443   WBC 4.67   HGB 9.0*   HCT 27.8*   MCV 88         Recent Labs   Lab 03/15/19  2019  03/19/19  0443   *   < > 194*      < > 136   K 4.3   < > 3.8      < > 105   CO2 22*   < > 28   BUN 22   < > 22   CREATININE 1.3   < > 1.2   CALCIUM 8.6*   < > 8.0*   MG 1.6  --   --    ALT 11   < > 13   AST 14   < > 15   ALBUMIN 3.7   < > 2.8*   BILITOT 1.0   < > 0.7    < > = values in this interval not displayed.         Vitals:  Temp: 97 °F (36.1 °C) (03/19/19 1153)  Pulse: 71 (03/19/19 1153)  Resp: 16 (03/19/19 1153)  BP: (!) 107/58 (03/19/19 1153)  SpO2: 99 % (03/19/19 1153)     Physical Exam:  ASA: 3  Mallampati: n/a    General: no acute distress  Mental Status: alert and oriented to person, place and time  HEENT: normocephalic, atraumatic  Chest: unlabored breathing  Heart: regular heart rate  Abdomen: distended  Extremity: moves all extremities    Plan: ultrasound guided paracentesis  Sedation Plan: local    ANDREW Valerio, REINIERP  Interventional Radiology  [phone] [employer]

## 2019-03-19 NOTE — PLAN OF CARE
Problem: Physical Therapy Goal  Goal: Physical Therapy Goal  Goals to be met by: 3/24/19     Patient will increase functional independence with mobility by performin. Supine to sit with Modified [place]  2. Gait  x 100 feet with Supervision using Rolling Walker or LRD.   3. Ascend/descend 3 stair with right Handrails Contact Guard Assistance using Rolling Walker or LRD.   4. Stand for 10 minutes with Contact Guard Assistance using Rolling Walker or LRD to prepare for standing ADLs.        Outcome: Ongoing (interventions implemented as appropriate)  Pt would benefit from continued skilled acute PT 3x/wk to improve functional mobility. Pt progressing well and as expected, POC is to cont. Towards current goals set to improve towards PLOF.

## 2019-03-19 NOTE — ASSESSMENT & PLAN NOTE
64 y/o M with multiple PCI, and CABG x3 in 2017 here with 3 months of CCSIII stable angina, during this hospitalization post IR biopsy he had AF RVR (new onset) and developed NSTEMi with trop peak at 3. Will proceed to angiogram tomorrow. Spoke with hepatology service, no contraindication to DAPT for 6 months to 1 year, no major surgeries planned as of now.    -Agree with Heparin gtt for ACS 48-72 hours in duration  -DAPT with ASA and Brillinta   -High intensity statin if okay from hepatology perspective   -Glycemic control   -BP < 130/80  -Keep NPO after midnight     1. Cardiac catheterization with probable PCI.   2. Antiplatelets: ASA Brilinta   3. Access: RCFA   4. Catheters: Hallie   5. Pt is a ANNALEE candidate and understands the importance of taking plavix for at least one year, understands that in case of receiving a drug coated stent the failure to comply with dual anti-platelet therapy is likely to result in stent clothing, heart attack and death.   6. The risks, benefits, and alternatives of coronary vascular angiography and possible intervention were discussed with the patient. All questions were answered and informed consent was obtained. I had a detailed discussion with the patient regarding risk of stroke, MI, bleeding access site complications including limb loss, allergy, kidney failure including dialysis and death.  7. The patient understands the risks and benefits and wishes to go ahead with the procedure.  8. All patient's questions were answered    Abdifatah Perera DO  Cardiology Fellow

## 2019-03-19 NOTE — ASSESSMENT & PLAN NOTE
[id number] at least 4, agree with anticoagulation for CVA prophylaxis. Currently in sinus rhythm.

## 2019-03-19 NOTE — PROGRESS NOTES
[facility]  Progress Note      Patient Name: Jian Arrieta  MRN:  [id number]  [facility] Team: [facility] [place] Lorie Higuera MD  Date of Admission:  3/12/2019     Length of Stay:  LOS: 4 days   Expected Discharge Date: 3/20/2019  Principal Problem:  Ascites      Subjective:    Interval History/Overnight Events:  No acute events overnight.  Cath delayed until antonio because waiting on cath records from .  Platelet count dropping - HIT panel in process.  Reports abdominal distension but still urinating well.      Review of Systems:  Constitutional: Negative for chills, fatigue, fever.   Respiratory: Negative for cough, shortness of breath.    Cardiovascular: + leg swelling.   Gastrointestinal: Negative for abdominal pain, constipation, diarrhea, nausea, vomiting.   Genitourinary: Negative for dysuria, frequency.   All other systems reviewed and are negative.      Objective:    Physical Exam:  Temp:  [96.4 °F (35.8 °C)-98.9 °F (37.2 °C)]   Pulse:  [68-94]   Resp:  [16-18]   BP: ()/(56-64)   SpO2:  [97 %-99 %]     Intake/Output Summary (Last 24 hours) at 3/19/2019 1430  Last data filed at 3/19/2019 0800  Gross per 24 hour   Intake 803.31 ml   Output 1240 ml   Net -436.69 ml       Constitutional: Appears well-developed and well-nourished.   Eyes: No scleral icterus or eye discharge  Cardiovascular: Normal heart rate.  Regular heart rhythm.  No murmur heard.  Pulmonary/Chest: Effort normal and breath sounds normal. No respiratory distress. No wheezes, rales, or rhonchi  Abdominal: Soft. Bowel sounds are normal.  Distension.  Fluid wave.  No tenderness  Musculoskeletal: No deformities.  BLE edema with chronic venous stasis  Neurological: Alert.  Oriented to person, place, and time.   Skin: Skin is warm and dry.       Assessment and Plan:    Mr. Jian Arrieta is a 63 y.o. male who presented to [facility] on 3/12/2019 with ascites.    Hospital Course:    Mr. Jian Arrieta was admitted to [facility]  for management of his ascites.  Liver ultrasound was performed which showed a cirrhotic liver with sequelae of portal hypertension, including severe volume ascites and splenomegaly.  Hepatology was consulted.  He had a paracentesis 3/13 that removed 10.8L, and another paracentesis on 3/15 that removed an additional 6L.  Transjugular liver biopsy was performed on 3/15, path pending.  After his procedure, he had an episode of chest pain with new afib with RVR.  He spontaneously converted. Troponin peaked at 3.  Cardiology was consulted and he was started on ACS protocol.  They believe that his troponin was elevated 2/2 demand ischemia from his afib.  He is tentatively scheduled to get a cath on 3/20 which was delayed 2/2 lack of cath and cardiac records from .    Ascites  Alcoholic Cirrhosis of Liver with Ascites  MELD-Na score: 10 at 3/19/2019  4:43 AM  MELD score: 10 at 3/19/2019  4:43 AM  Calculated from:  Serum Creatinine: 1.2 mg/dL at 3/19/2019  4:43 AM  Serum Sodium: 136 mmol/L at 3/19/2019  4:43 AM  Total Bilirubin: 0.7 mg/dL (Rounded to 1 mg/dL) at 3/19/2019  4:43 AM  INR(ratio): 1.2 at 3/19/2019  4:43 AM  · Age: 63 years   · Paracentesis 3/13 removed 10.8L and another paracentesis on 3/15 that removed an additional 6L.  Repeat para today 3/19 with albumin  · Transjugular liver biopsy was performed on 3/15, path pending  · Continue Lasix 40mg PO daily  · Continue Aldactone 100mg PO daily    NSTEMI  · Per Cardiology likely 2/2 demand ischemia from afib  · Troponin peaked at 3  · Contiune Heparin gtt for now.  HIT panel pending given dropping platelet count  · Continue Aspirin 81mg PO daily  · Continue Brilinta 90mg PO BID  · Continue Lipitor 40mg PO daily  · Continue Metoprolol 50mg PO BID  · Prn Nitro for chest pain  · Continue tele  · Interventional Cards consulted, appreciate assistance.  Plan for cath 3/20  · Records from  obtained and in chart    CAD  · Reports a history of 5 stents with CABG at  in  2017  · Scheduled for PET stress next week to evaluate patency  · Medications as above    Paroxysmal AFib  · Likely hypovolemic in origin 2/2 large volume paracentesis x 2  · [id number] score: 3  · Continue tele  · Continue Heparin gtt.  Per Hepatology, no preference for NOAC/Warfarin after procedure.  Will need to discuss risk/benefit of bleeding as he is also on Aspirin  · Continue Metoprolol 50mg PO BID   · Cards consulted, appreciate assistance  · Continue Amio 400mg PO BID x 14 days then 200mg PO daily thereafter    Chronic Pancreatitis  · Chronic and stable  · Continue enzyme replacememnt    Essential HTN  · Baseline BP 90-100s, given cirrhosis BP unlikely to be higher  · Continue Metoprolol 50mg PO BID  · Continue Lasix 40mg PO daily  · Continue Aldactone 100mg PO daily  · Can consider restarting Ramipril post cath    Chronic Combined Systolic and Diastolic Heart Failure  · Chronic and stable  · Continue Metoprolol 50mg PO BID  · Continue Lasix 40mg PO daily  · Continue Aldactone 100mg PO daily    Type 2 DM with Long Term Use of Insulin and Diabetic Peripheral Neuropathy  · HbA1c 6.9 in Jan 2019, will repeat  · Home DM regimen:  Aspart 4 units TIDWM and Detemir 12 units daily  · Contiune Detemir 7 units  · SSI with POCT accuchecks and Diabetic diet    Anemia  · Hgb 9, stable for now  · S/p 1 unit PRBCs  · Goal hemoglobin 9 given CAD  · Monitor for bleeding with DAPT in the setting of liver disease    Morbid Obesity  Body mass index is 46.49 kg/m².  · Encourage diet, weight loss - can't exercise 2/2 LE edema      Diet:  Low Sodium then NPO at midnight for cath  GI PPx:  PPI  DVT PPx:  Heparin gtt  Goals of Care:  Full    High Risk Conditions:  Patient is currently on drug therapy requiring intensive monitoring for toxicity: Heparin gtt     Disposition:  Pending Cards recs likely home 3/21  Discharge Needs:  Cards follow up outpatient for afib and CAD.  Hepatology follow up for outpatient para.  HH  PT.    Lorie Higuera MD  [facility]  Cell:  [phone]  Spectra:  [phone]  Pager:  [phone]

## 2019-03-19 NOTE — PT/OT/SLP PROGRESS
Physical Therapy Treatment    Patient Name:  Jain Arrieta   MRN:  [id number]    Recommendations:     Discharge Recommendations:  home health PT   Discharge Equipment Recommendations: none   Barriers to discharge: None    Assessment:     Jian Arrieta is a 63 y.o. male admitted with a medical diagnosis of Ascites.  He presents with the following impairments/functional limitations:  weakness, impaired endurance, impaired functional mobilty, gait instability, impaired self care skills, impaired balance. Pt requesting therapy enter room from window. Pt sitting up in bedside chair upon arrival requesting therapy provide theraband and education to increase strength. Pt provided Red theraband and education on UE and LE there ex to perform. Pt educated therapy's plans to return for a following session to address gait and endurance goals.     Rehab Prognosis: Fair; patient would benefit from acute skilled PT services to address these deficits and reach maximum level of function.    Recent Surgery: Procedure(s) (LRB):  ANGIOGRAM, CORONARY ARTERY (Right) 1 Day Post-Op    Plan:     During this hospitalization, patient to be seen 3 x/week to address the identified rehab impairments via gait training, therapeutic activities, therapeutic exercises, neuromuscular re-education and progress toward the following goals:    · Plan of Care Expires:  03/14/19    Subjective     Chief Complaint: none noted   Patient/Family Comments/goals: pt requesting thera band to perform exercises while sitting up   Pain/Comfort:  · Pain Rating 1: 0/10      Objective:     Communicated with RN prior to session.  Patient found all lines intact and wife present peripheral IV  upon PT entry to room.     General Precautions: Standard, fall, aspiration   Orthopedic Precautions:N/A   Braces:         AM-PAC 6 CLICK MOBILITY  Turning over in bed (including adjusting bedclothes, sheets and blankets)?: 3  Sitting down on and standing up from a chair with arms  (e.g., wheelchair, bedside commode, etc.): 3  Moving from lying on back to sitting on the side of the bed?: 3  Moving to and from a bed to a chair (including a wheelchair)?: 3  Need to walk in hospital room?: 3  Climbing 3-5 steps with a railing?: 3  Basic Mobility Total Score: 18       Therapeutic Activities and Exercises:   -Red theraband provided and education on UE/LE exercises to perform.   - pt educated on importance to cont. OOB activity and plans for therapy to return in following days to cont. Towards gait goals.     Patient left up in chair with all lines intact, call button in reach and wife present..    GOALS:   Multidisciplinary Problems     Physical Therapy Goals        Problem: Physical Therapy Goal    Goal Priority Disciplines Outcome Goal Variances Interventions   Physical Therapy Goal     PT, PT/OT Ongoing (interventions implemented as appropriate)     Description:  Goals to be met by: 3/24/19     Patient will increase functional independence with mobility by performin. Supine to sit with Modified [place]  2. Gait  x 100 feet with Supervision using Rolling Walker or LRD.   3. Ascend/descend 3 stair with right Handrails Contact Guard Assistance using Rolling Walker or LRD.   4. Stand for 10 minutes with Contact Guard Assistance using Rolling Walker or LRD to prepare for standing ADLs.                         Time Tracking:     PT Received On: 19  PT Start Time: 1205     PT Stop Time: 1213  PT Total Time (min): 8 min     Billable Minutes: Therapeutic Activity 8    Treatment Type: Treatment  PT/PTA: PT     PTA Visit Number: 0     Selvin Daley, PT  2019

## 2019-03-19 NOTE — PROGRESS NOTES
Patient to [facility]. Orders, labs, and allergies reviewed. Awaiting consent.

## 2019-03-19 NOTE — PROCEDURES
Radiology Post-Procedure Note    Pre Op Diagnosis: Ascites  Post Op Diagnosis: Same    Procedure: Ultrasound Guided Paracentesis    Procedure performed by: Cele GARCIA, Reyna     Written Informed Consent Obtained: Yes  Specimen Removed: YES clear yellow  Estimated Blood Loss: Minimal    Findings:   Successful paracentesis.  Albumin administered PRN per protocol.    Patient tolerated procedure well.    Reyna Oakley, APRN, FNP  Interventional Radiology  [phone] spectralink

## 2019-03-19 NOTE — PLAN OF CARE
TRACIE informed Dr. Lorie Wayne of medical records received from [facility]. Records placed in the patient's chart as requested. CM informed the patient of records received. Will continue to follow.

## 2019-03-19 NOTE — PROGRESS NOTES
[facility]  Interventional Cardiology  Progress Note    Patient Name: Jian Arrieta  MRN: [id number]  Admission Date: 3/12/2019  Hospital Length of Stay: 4 days  Code Status: Full Code   Attending Physician: Lorie Higuera MD   Primary Care Physician: Samir Mahmood MD  Principal Problem:Ascites    Subjective:     Interval Hx: Resting comfortably    Past Surgical History:   Procedure Laterality Date    ANGIOPLASTY      total x5 stents    COLONOSCOPY N/A 10/6/2015    Performed by Shekhar Richards MD at [facility]O (2ND FLR)    CORONARY ARTERY BYPASS GRAFT  2017    x3    CYST REMOVAL      GASTRECTOMY      GASTRECTOMY-SLEEVE-LAPAROSCOPIC - 06245 N/A 12/22/2015    Performed by Micheal Villavicencio Jr., MD at [facility] OR 2ND FLR    KNEE ARTHROSCOPY      perianal surgery      perianal cyst removed       Review of patient's allergies indicates:  No Known Allergies    PTA Medications   Medication Sig    aspirin 325 MG tablet Take 81 mg by mouth once daily.    atorvastatin (LIPITOR) 40 MG tablet Take 1 tablet (40 mg total) by mouth once daily.    cyanocobalamin, vitamin B-12, 500 mcg Subl Place 1 tablet under the tongue every Monday.     furosemide (LASIX) 40 MG tablet Take 1 tablet (40 mg total) by mouth once daily.    insulin aspart U-100 (NOVOLOG) 100 unit/mL injection Inject 4 Units into the skin 3 (three) times daily before meals.    insulin detemir (LEVEMIR FLEXPEN SUBQ) Inject 12 Units into the skin once daily.     lipase-protease-amylase (CREON) 36,000-114,000- 180,000 unit CpDR Take 1 capsule by mouth 3 (three) times daily.    metoprolol succinate (TOPROL-XL) 100 MG 24 hr tablet Take 1 tablet (100 mg total) by mouth once daily.    omeprazole (PRILOSEC) 40 MG capsule Take 40 mg by mouth once daily.    ONETOUCH ULTRA TEST Strp 4 (four) times daily. Use to test blood sugar four times a day.    ramipril (ALTACE) 2.5 MG capsule Take 1 capsule (2.5 mg total) by mouth once daily.    spironolactone  (ALDACTONE) 100 MG tablet Take 1 tablet (100 mg total) by mouth once daily.    tamsulosin (FLOMAX) 0.4 mg Cap Take 1 capsule by mouth once daily. Pt has not started taking as of 19.    vitamin D (VITAMIN D3) 1000 units Tab Take 1 tablet (1,000 Units total) by mouth once daily.     Family History     Problem Relation (Age of Onset)    Cancer Mother, Father, Paternal Grandfather, Brother    Diabetes Maternal Grandmother    Heart disease Father    No Known Problems Paternal Grandmother    Obesity Sister    Parkinsonism Brother    Stroke Maternal Grandfather        Tobacco Use    Smoking status: Former Smoker     Packs/day: 2.00     Years: 30.00     Pack years: 60.00     Types: Cigarettes     Last attempt to quit: 2005     Years since quittin.1    Smokeless tobacco: Never Used   Substance and Sexual Activity    Alcohol use: No     Comment: started ~, reports 1 shot daily, max 3 shots daily, vague about alcohol consumption. Last drink 2018    Drug use: No    Sexual activity: Not on file     Review of Systems   All other systems reviewed and are negative.    Objective:     Vital Signs (Most Recent):  Temp: 97.6 °F (36.4 °C) (19)  Pulse: 75 (19)  Resp: 18 (19)  BP: 105/60 (19)  SpO2: 97 % (19) Vital Signs (24h Range):  Temp:  [96.2 °F (35.7 °C)-98 °F (36.7 °C)] 97.6 °F (36.4 °C)  Pulse:  [68-94] 75  Resp:  [18] 18  SpO2:  [97 %-100 %] 97 %  BP: ()/(50-64) 105/60     Weight: 134.6 kg (296 lb 13.6 oz)  Body mass index is 46.49 kg/m².    SpO2: 97 %  O2 Device (Oxygen Therapy): room air      Intake/Output Summary (Last 24 hours) at 3/19/2019 7901  Last data filed at 3/19/2019 0500  Gross per 24 hour   Intake 1003.31 ml   Output 1140 ml   Net -136.69 ml       Lines/Drains/Airways     Peripherally Inserted Central Catheter Line                 PICC Double Lumen 03/15/19 1733 right brachial 3 days          Peripheral Intravenous Line                  Peripheral IV - Single Lumen 03/15/19 1340 Anterior;Distal;Right Upper Arm 3 days                Physical Exam   Constitutional: He is oriented to person, place, and time. He appears well-developed and well-nourished. No distress.   Eyes: No scleral icterus.   Cardiovascular: Normal rate, regular rhythm and normal heart sounds.   No murmur heard.  Pulses:       Radial pulses are 2+ on the right side, and 2+ on the left side.        Femoral pulses are 2+ on the right side, and 2+ on the left side.  Pulmonary/Chest: Effort normal and breath sounds normal. No respiratory distress. He has no wheezes. He has no rales.   Abdominal: Soft. There is no tenderness. There is no rebound and no guarding.   Musculoskeletal: He exhibits edema.   3+ edema above and below the knee b/l   Neurological: He is alert and oriented to person, place, and time.   Skin: Skin is warm. He is not diaphoretic.   Psychiatric: He has a normal mood and affect.       Significant Labs: All pertinent lab results from the last 24 hours have been reviewed.    Significant Imaging: EKG: Sinus tachycardia left axis deviation, PVC, low voltage     Assessment and Plan:     Patient is a 63 y.o. male presenting with:    Paroxysmal atrial fibrillation    [id number] at least 4, agree with anticoagulation for CVA prophylaxis. Currently in sinus rhythm.      NSTEMI (non-ST elevated myocardial infarction)    64 y/o M with multiple PCI, and CABG x3 in 2017 here with 3 months of CCSIII stable angina, during this hospitalization post IR biopsy he had AF RVR (new onset) and developed NSTEMi with trop peak at 3. Will proceed to angiogram tomorrow. Spoke with hepatology service, no contraindication to DAPT for 6 months to 1 year, no major surgeries planned as of now.    -Agree with Heparin gtt for ACS 48-72 hours in duration  -DAPT with ASA and Brillinta   -High intensity statin if okay from hepatology perspective   -Glycemic control   -BP < 130/80  -Keep NPO  after midnight     1. Cardiac catheterization with probable PCI.   2. Antiplatelets: ASA Brilinta   3. Access: RCFA   4. Catheters: Hallie   5. Pt is a ANNALEE candidate and understands the importance of taking plavix for at least one year, understands that in case of receiving a drug coated stent the failure to comply with dual anti-platelet therapy is likely to result in stent clothing, heart attack and death.   6. The risks, benefits, and alternatives of coronary vascular angiography and possible intervention were discussed with the patient. All questions were answered and informed consent was obtained. I had a detailed discussion with the patient regarding risk of stroke, MI, bleeding access site complications including limb loss, allergy, kidney failure including dialysis and death.  7. The patient understands the risks and benefits and wishes to go ahead with the procedure.  8. All patient's questions were answered    Abdifatah Perera DO  Cardiology Fellow          Anemia    Anemia likely secondary to chronic disease      Chronic combined systolic and diastolic heart failure    Ef reported as 30% recommend HF therapy with ACE-I and beta blocker such as coreg BID or Metoprolol Succinate daily.         VTE Risk Mitigation (From admission, onward)        Ordered     heparin 25,000 units in dextrose 5% 250 mL (100 units/mL) infusion LOW INTENSITY nomogram - [facility]  Continuous      03/16/19 0657     heparin 25,000 units in dextrose 5% (100 units/ml) IV bolus from bag - ADDITIONAL PRN BOLUS - 60 units/kg (max bolus 4000 units)  As needed (PRN)      03/16/19 0355     heparin 25,000 units in dextrose 5% (100 units/ml) IV bolus from bag - ADDITIONAL PRN BOLUS - 30 units/kg (max bolus 4000 units)  As needed (PRN)      03/16/19 0355     IP VTE HIGH RISK PATIENT  Once      03/13/19 2015          Abdifatah Perera MD  Interventional Cardiology  [facility]

## 2019-03-20 LAB
ALBUMIN SERPL BCP-MCNC: 3.2 G/DL
ALP SERPL-CCNC: 63 U/L
ALT SERPL W/O P-5'-P-CCNC: 11 U/L
ANION GAP SERPL CALC-SCNC: 5 MMOL/L
APTT BLDCRRT: 35.1 SEC
APTT BLDCRRT: 64.5 SEC
AST SERPL-CCNC: 10 U/L
BACTERIA BLD CULT: NORMAL
BILIRUB SERPL-MCNC: 1 MG/DL
BUN SERPL-MCNC: 21 MG/DL
CALCIUM SERPL-MCNC: 8.1 MG/DL
CHLORIDE SERPL-SCNC: 104 MMOL/L
CO2 SERPL-SCNC: 27 MMOL/L
CREAT SERPL-MCNC: 1.4 MG/DL
EST. GFR  (AFRICAN AMERICAN): >60 ML/MIN/1.73 M^2
EST. GFR  (NON AFRICAN AMERICAN): 52.7 ML/MIN/1.73 M^2
GLUCOSE SERPL-MCNC: 304 MG/DL
HEPARIN INDUCED THROMBOCYTOPENIA: NORMAL
INR PPP: 1.2
POCT GLUCOSE: 165 MG/DL (ref 70–110)
POCT GLUCOSE: 301 MG/DL (ref 70–110)
POCT GLUCOSE: 347 MG/DL (ref 70–110)
POTASSIUM SERPL-SCNC: 3.7 MMOL/L
PROT SERPL-MCNC: 4.7 G/DL
PROTHROMBIN TIME: 11.9 SEC
SODIUM SERPL-SCNC: 136 MMOL/L

## 2019-03-20 PROCEDURE — 63600175 PHARM REV CODE 636 W HCPCS: Mod: JG | Performed by: HOSPITALIST

## 2019-03-20 PROCEDURE — C1760 CLOSURE DEV, VASC: HCPCS | Performed by: INTERNAL MEDICINE

## 2019-03-20 PROCEDURE — 25000003 PHARM REV CODE 250: Performed by: PHYSICIAN ASSISTANT

## 2019-03-20 PROCEDURE — 93455 PR CATH PLACE/CORONARY ANGIO, IMG SUPER/INTERP, BYPASS ANGIO: ICD-10-PCS | Mod: 26,,, | Performed by: INTERNAL MEDICINE

## 2019-03-20 PROCEDURE — C1894 INTRO/SHEATH, NON-LASER: HCPCS | Performed by: INTERNAL MEDICINE

## 2019-03-20 PROCEDURE — 63600175 PHARM REV CODE 636 W HCPCS: Performed by: INTERNAL MEDICINE

## 2019-03-20 PROCEDURE — 85730 THROMBOPLASTIN TIME PARTIAL: CPT | Mod: 91

## 2019-03-20 PROCEDURE — 99152 PR MOD CONSCIOUS SEDATION, SAME PHYS, 5+ YRS, FIRST 15 MIN: ICD-10-PCS | Mod: ,,, | Performed by: INTERNAL MEDICINE

## 2019-03-20 PROCEDURE — 85610 PROTHROMBIN TIME: CPT

## 2019-03-20 PROCEDURE — C1769 GUIDE WIRE: HCPCS | Performed by: INTERNAL MEDICINE

## 2019-03-20 PROCEDURE — 25000003 PHARM REV CODE 250: Performed by: STUDENT IN AN ORGANIZED HEALTH CARE EDUCATION/TRAINING PROGRAM

## 2019-03-20 PROCEDURE — 25000003 PHARM REV CODE 250

## 2019-03-20 PROCEDURE — P9047 ALBUMIN (HUMAN), 25%, 50ML: HCPCS | Mod: JG | Performed by: HOSPITALIST

## 2019-03-20 PROCEDURE — 80053 COMPREHEN METABOLIC PANEL: CPT

## 2019-03-20 PROCEDURE — 99152 MOD SED SAME PHYS/QHP 5/>YRS: CPT | Mod: ,,, | Performed by: INTERNAL MEDICINE

## 2019-03-20 PROCEDURE — 99153 MOD SED SAME PHYS/QHP EA: CPT | Performed by: INTERNAL MEDICINE

## 2019-03-20 PROCEDURE — 93455 CORONARY ART/GRFT ANGIO S&I: CPT | Performed by: INTERNAL MEDICINE

## 2019-03-20 PROCEDURE — 99152 MOD SED SAME PHYS/QHP 5/>YRS: CPT | Performed by: INTERNAL MEDICINE

## 2019-03-20 PROCEDURE — 97116 GAIT TRAINING THERAPY: CPT

## 2019-03-20 PROCEDURE — 11000001 HC ACUTE MED/SURG PRIVATE ROOM

## 2019-03-20 PROCEDURE — 93455 CORONARY ART/GRFT ANGIO S&I: CPT | Mod: 26,,, | Performed by: INTERNAL MEDICINE

## 2019-03-20 PROCEDURE — 99231 PR SUBSEQUENT HOSPITAL CARE,LEVL I: ICD-10-PCS | Mod: ,,, | Performed by: HOSPITALIST

## 2019-03-20 PROCEDURE — 25000003 PHARM REV CODE 250: Performed by: NURSE PRACTITIONER

## 2019-03-20 PROCEDURE — 99231 SBSQ HOSP IP/OBS SF/LOW 25: CPT | Mod: ,,, | Performed by: HOSPITALIST

## 2019-03-20 PROCEDURE — 63600175 PHARM REV CODE 636 W HCPCS: Performed by: PHYSICIAN ASSISTANT

## 2019-03-20 PROCEDURE — 25500020 PHARM REV CODE 255: Performed by: INTERNAL MEDICINE

## 2019-03-20 PROCEDURE — 25000003 PHARM REV CODE 250: Performed by: INTERNAL MEDICINE

## 2019-03-20 PROCEDURE — 25000003 PHARM REV CODE 250: Performed by: HOSPITALIST

## 2019-03-20 RX ORDER — RAMIPRIL 2.5 MG/1
2.5 CAPSULE ORAL DAILY
Start: 2019-03-20 | End: 2019-04-04

## 2019-03-20 RX ORDER — SODIUM CHLORIDE 9 MG/ML
INJECTION, SOLUTION INTRAVENOUS CONTINUOUS
Status: DISCONTINUED | OUTPATIENT
Start: 2019-03-20 | End: 2019-03-21 | Stop reason: HOSPADM

## 2019-03-20 RX ORDER — LIDOCAINE HYDROCHLORIDE 20 MG/ML
INJECTION, SOLUTION EPIDURAL; INFILTRATION; INTRACAUDAL; PERINEURAL
Status: DISCONTINUED | OUTPATIENT
Start: 2019-03-20 | End: 2019-03-20 | Stop reason: HOSPADM

## 2019-03-20 RX ORDER — CEFAZOLIN SODIUM 1 G/3ML
INJECTION, POWDER, FOR SOLUTION INTRAMUSCULAR; INTRAVENOUS
Status: DISCONTINUED | OUTPATIENT
Start: 2019-03-20 | End: 2019-03-20 | Stop reason: HOSPADM

## 2019-03-20 RX ORDER — DIPHENHYDRAMINE HCL 50 MG
CAPSULE ORAL
Status: DISPENSED
Start: 2019-03-20 | End: 2019-03-21

## 2019-03-20 RX ORDER — NAPROXEN SODIUM 220 MG/1
81 TABLET, FILM COATED ORAL DAILY
Refills: 0 | COMMUNITY
Start: 2019-03-21 | End: 2019-03-20 | Stop reason: HOSPADM

## 2019-03-20 RX ORDER — CLOPIDOGREL BISULFATE 75 MG/1
75 TABLET ORAL DAILY
Qty: 30 TABLET | Refills: 11 | Status: SHIPPED | OUTPATIENT
Start: 2019-03-21 | End: 2019-08-26

## 2019-03-20 RX ORDER — HEPARIN SODIUM 200 [USP'U]/100ML
INJECTION, SOLUTION INTRAVENOUS
Status: DISCONTINUED | OUTPATIENT
Start: 2019-03-20 | End: 2019-03-21 | Stop reason: HOSPADM

## 2019-03-20 RX ORDER — CLOPIDOGREL BISULFATE 75 MG/1
75 TABLET ORAL DAILY
Status: DISCONTINUED | OUTPATIENT
Start: 2019-03-21 | End: 2019-03-21 | Stop reason: HOSPADM

## 2019-03-20 RX ORDER — SODIUM CHLORIDE 9 MG/ML
INJECTION, SOLUTION INTRAVENOUS CONTINUOUS
Status: ACTIVE | OUTPATIENT
Start: 2019-03-20 | End: 2019-03-20

## 2019-03-20 RX ORDER — DIPHENHYDRAMINE HCL 50 MG
50 CAPSULE ORAL EVERY 6 HOURS
Status: COMPLETED | OUTPATIENT
Start: 2019-03-20 | End: 2019-03-20

## 2019-03-20 RX ORDER — MIDAZOLAM HYDROCHLORIDE 1 MG/ML
INJECTION, SOLUTION INTRAMUSCULAR; INTRAVENOUS
Status: DISCONTINUED | OUTPATIENT
Start: 2019-03-20 | End: 2019-03-20 | Stop reason: HOSPADM

## 2019-03-20 RX ORDER — AMIODARONE HYDROCHLORIDE 400 MG/1
400 TABLET ORAL 2 TIMES DAILY
Qty: 22 TABLET | Refills: 0 | Status: SHIPPED | OUTPATIENT
Start: 2019-03-20 | End: 2019-03-31

## 2019-03-20 RX ORDER — AMIODARONE HYDROCHLORIDE 200 MG/1
200 TABLET ORAL DAILY
Qty: 30 TABLET | Refills: 11 | Status: SHIPPED | OUTPATIENT
Start: 2019-04-01 | End: 2019-04-04

## 2019-03-20 RX ORDER — FENTANYL CITRATE 50 UG/ML
INJECTION, SOLUTION INTRAMUSCULAR; INTRAVENOUS
Status: DISCONTINUED | OUTPATIENT
Start: 2019-03-20 | End: 2019-03-20 | Stop reason: HOSPADM

## 2019-03-20 RX ADMIN — ATORVASTATIN CALCIUM 40 MG: 20 TABLET, FILM COATED ORAL at 10:03

## 2019-03-20 RX ADMIN — DIPHENHYDRAMINE HYDROCHLORIDE 50 MG: 50 CAPSULE ORAL at 02:03

## 2019-03-20 RX ADMIN — INSULIN ASPART 2 UNITS: 100 INJECTION, SOLUTION INTRAVENOUS; SUBCUTANEOUS at 12:03

## 2019-03-20 RX ADMIN — ASPIRIN 81 MG CHEWABLE TABLET 81 MG: 81 TABLET CHEWABLE at 10:03

## 2019-03-20 RX ADMIN — INSULIN ASPART 2 UNITS: 100 INJECTION, SOLUTION INTRAVENOUS; SUBCUTANEOUS at 08:03

## 2019-03-20 RX ADMIN — PANTOPRAZOLE SODIUM 40 MG: 40 TABLET, DELAYED RELEASE ORAL at 10:03

## 2019-03-20 RX ADMIN — METOPROLOL TARTRATE 50 MG: 50 TABLET ORAL at 09:03

## 2019-03-20 RX ADMIN — INSULIN ASPART 2 UNITS: 100 INJECTION, SOLUTION INTRAVENOUS; SUBCUTANEOUS at 07:03

## 2019-03-20 RX ADMIN — PANCRELIPASE 3 CAPSULE: 60000; 12000; 38000 CAPSULE, DELAYED RELEASE PELLETS ORAL at 06:03

## 2019-03-20 RX ADMIN — SPIRONOLACTONE 100 MG: 100 TABLET, FILM COATED ORAL at 10:03

## 2019-03-20 RX ADMIN — SODIUM CHLORIDE: 0.9 INJECTION, SOLUTION INTRAVENOUS at 01:03

## 2019-03-20 RX ADMIN — INSULIN ASPART 8 UNITS: 100 INJECTION, SOLUTION INTRAVENOUS; SUBCUTANEOUS at 08:03

## 2019-03-20 RX ADMIN — ALBUMIN HUMAN 25 G: 0.25 SOLUTION INTRAVENOUS at 12:03

## 2019-03-20 RX ADMIN — TICAGRELOR 90 MG: 90 TABLET ORAL at 10:03

## 2019-03-20 RX ADMIN — APIXABAN 5 MG: 5 TABLET, FILM COATED ORAL at 09:03

## 2019-03-20 RX ADMIN — METOPROLOL TARTRATE 50 MG: 50 TABLET ORAL at 10:03

## 2019-03-20 RX ADMIN — AMIODARONE HYDROCHLORIDE 400 MG: 200 TABLET ORAL at 10:03

## 2019-03-20 RX ADMIN — FUROSEMIDE 40 MG: 40 TABLET ORAL at 10:03

## 2019-03-20 RX ADMIN — HEPARIN SODIUM AND DEXTROSE 14 UNITS/KG/HR: 10000; 5 INJECTION INTRAVENOUS at 11:03

## 2019-03-20 RX ADMIN — AMIODARONE HYDROCHLORIDE 400 MG: 200 TABLET ORAL at 09:03

## 2019-03-20 NOTE — BRIEF OP NOTE
"    Post Cath Note  Referring Physician: Lorie Higuera MD  Procedure: ANGIOGRAM, CORONARY ARTERY (Right), Bypass graft study       Access: Right CFA  Findings: Patent LIMA-LAD, patent SVG-OM, occluded SVG-PDA,  proximal RCA with left to right collaterals, 95% proximal LAD, 75% prox-mid Lcx, 90% mid to distal Lcx  LVEDP 7 mmHg  Intervention: none  Closure: Mynx collagen closure device  See full report for further details    Post Cath Exam:   BP 90/60 (BP Location: Left arm, Patient Position: Sitting)   Pulse 71   Temp 98.2 °F (36.8 °C) (Oral)   Resp 16   Ht 5' 7" (1.702 m)   Wt 134.6 kg (296 lb 13.6 oz)   SpO2 99%   BMI 46.49 kg/m²   No unusual pain, hematoma, thrill or bruit at vascular access site.  Distal pulse present without signs of ischemia.    Recommendations:   - Routine post-cath care  - IVF at 2 cc/kg/hr for 4 hrs (low LVEDP)  - Continue medical management:    -DAPT recommended for 1 year (post NSTEMI); would switch to plavix 75 daily given plan for anticoagulation for AF   -Continue ASA indefinitely, continue high intensity statin, BB, ACE  - Cardiac rehab referral  - Consider referral for evaluation for MARIELENA occlusion device (Watchman) for atrial fibrillation given elevated bleeding risk and indication for DAPT to avoid oral anticoagulation  - Follow up with Cardiology    Signed:  Beltran Kaplan MD  Cardiology Fellow  Pager [phone]    "

## 2019-03-20 NOTE — PLAN OF CARE
Problem: Physical Therapy Goal  Goal: Physical Therapy Goal  Goals to be met by: 3/24/19     Patient will increase functional independence with mobility by performin. Supine to sit with Modified [place] -Met 3/20/2019   2. Gait  x 100 feet with Supervision using Rolling Walker or LRD -Met 3/20/2019   3. Ascend/descend 3 stair with right Handrails Contact Guard Assistance using Rolling Walker or LRD.   4. Stand for 10 minutes with Contact Guard Assistance using Rolling Walker or LRD to prepare for standing ADLs.        Outcome: Ongoing (interventions implemented as appropriate)  Pt would benefit from continued skilled acute PT 3x/wk to improve functional mobility. Pt progressing well and as expected, POC is to cont. Towards current goals set to improve towards PLOF.

## 2019-03-20 NOTE — PLAN OF CARE
Problem: Adult Inpatient Plan of Care  Goal: Plan of Care Review  Outcome: Ongoing (interventions implemented as appropriate)  POC: NPO for scheduled angiogram today, monitor paracentesis site, RLQ abd, dressing changed, continue hepain drip per protocal, at 14u/kg/hour, telemetry monitor continue, SR with occassional PVCs noted, BP running low, SBP 90s, HR, 70- 80s, remains asymptomatic, amb in room without difficulty, does c/o mild SOB and fatigue with exertion. Will continue to monitor.

## 2019-03-20 NOTE — PT/OT/SLP PROGRESS
"Physical Therapy Treatment    Patient Name:  Jian Arrieta   MRN:  [id number]    Recommendations:     Discharge Recommendations:  home health PT   Discharge Equipment Recommendations: none   Barriers to discharge: None    Assessment:     Jian Arrieta is a 63 y.o. male admitted with a medical diagnosis of Ascites.  He presents with the following impairments/functional limitations:  impaired endurance. Pt found supine upon therapist entry using theraband provided during previous treatment. Pt demonstrating dramatic increase in LE AROM demonstrated by pt performing SLR in supine, positioning RLE with 90deg hip flex. Pt verbalizing he has used theraband all day and feels much stronger. Pt demonstrating Mod I with all mobility but following gait training pt voices concern entering steps at home. Pt declining stair training following this concern stating "I'm probably good on walking today". Therapy to focus on stair training in following session. Pt would benefit from continued skilled acute PT 3x/wk to improve functional mobility.  Recommending pt receive PT services in HHPT setting following discharge from hospital once medically cleared.      Rehab Prognosis: Good; patient would benefit from acute skilled PT services to address these deficits and reach maximum level of function.    Recent Surgery: Procedure(s) (LRB):  ANGIOGRAM, CORONARY ARTERY (Right) Day of Surgery    Plan:     During this hospitalization, patient to be seen 3 x/week to address the identified rehab impairments via gait training, therapeutic activities, therapeutic exercises, neuromuscular re-education and progress toward the following goals:    · Plan of Care Expires:  04/14/19    Subjective     Chief Complaint: none noted  Patient/Family Comments/goals: requested additional theraband in longer lengths. Pt received BTB and extended lengths of RTB.   Pain/Comfort:  · Pain Rating 1: 0/10      Objective:     Communicated with RN prior to session.  " Patient found supine with peripheral IV upon PT entry to room.     General Precautions: Standard, fall, aspiration   Orthopedic Precautions:N/A   Braces: N/A     Functional Mobility:  · Bed Mobility:     · Rolling Right: modified independence  · Scooting: modified independence  · Supine to Sit: modified independence  · Sit to Supine: modified independence  · Transfers:     · Sit to Stand:  modified independence with no AD  · Gait: pt amb 120' Mod I managing his own IV pole  · Balance: Mod I       AM-PAC 6 CLICK MOBILITY  Turning over in bed (including adjusting bedclothes, sheets and blankets)?: 4  Sitting down on and standing up from a chair with arms (e.g., wheelchair, bedside commode, etc.): 4  Moving from lying on back to sitting on the side of the bed?: 4  Moving to and from a bed to a chair (including a wheelchair)?: 4  Need to walk in hospital room?: 4  Climbing 3-5 steps with a railing?: 3  Basic Mobility Total Score: 23       Therapeutic Activities and Exercises:   - EOB sitting for 5mins   - Static standing while weighing on scale. Nursing notified of weight   - Additional BTB and RTB provided and education on there ex to be performed for both LE and UE's   - Pt educated on:   -PT roles, expectations, and POC    -Safety with mobility   -Benefits of OOB activities to increase strength and functional mobility    -Performing ther ex for increasing LE ROM and strength   -Discharge recommendations      Patient left supine with all lines intact and call button in reach..    GOALS:   Multidisciplinary Problems     Physical Therapy Goals        Problem: Physical Therapy Goal    Goal Priority Disciplines Outcome Goal Variances Interventions   Physical Therapy Goal     PT, PT/OT Ongoing (interventions implemented as appropriate)     Description:  Goals to be met by: 3/24/19     Patient will increase functional independence with mobility by performin. Supine to sit with Modified [place] -Met 3/20/2019    2. Gait  x 100 feet with Supervision using Rolling Walker or LRD -Met 3/20/2019   3. Ascend/descend 3 stair with right Handrails Contact Guard Assistance using Rolling Walker or LRD.   4. Stand for 10 minutes with Contact Guard Assistance using Rolling Walker or LRD to prepare for standing ADLs.                          Time Tracking:     PT Received On: 03/20/19  PT Start Time: 1041     PT Stop Time: 1058  PT Total Time (min): 17 min     Billable Minutes: Gait Training 17    Treatment Type: Treatment  PT/PTA: PT     PTA Visit Number: 0     Selvin Daley, PT  03/20/2019

## 2019-03-20 NOTE — PLAN OF CARE
Problem: Adult Inpatient Plan of Care  Goal: Plan of Care Review  Outcome: Ongoing (interventions implemented as appropriate)  POC: paracentesis today with 5.6L output, patient tolerated well, 50g albumin given per orders post-paracentesis, instructed on NPO after MN for angiogram in morning, Heparin drip maintained per normogram orders.

## 2019-03-20 NOTE — PROGRESS NOTES
[facility] Medicine  Progress Note      Patient Name: Jian Arrieta  MRN:  [id number]  [facility] Medicine Team: [facility] HOSP MED V Lorie Higuera MD  Date of Admission:  3/12/2019     Length of Stay:  LOS: 5 days   Expected Discharge Date: 3/21/2019  Principal Problem:  Ascites      Subjective:    Interval History/Overnight Events:  No acute events overnight.  Para done 3/19 with 5.6L removed.  No complaints this morning.  Plan for angiogram.  Patient requesting HH on DC.      Review of Systems:  Constitutional: Negative for chills, fatigue, fever.   Respiratory: Negative for cough, shortness of breath.    Cardiovascular: + leg swelling.   Gastrointestinal: Negative for abdominal pain, constipation, diarrhea, nausea, vomiting.   Genitourinary: Negative for dysuria, frequency.   All other systems reviewed and are negative.      Objective:    Physical Exam:  Temp:  [97 °F (36.1 °C)-98.6 °F (37 °C)]   Pulse:  [71-93]   Resp:  [16-18]   BP: ()/(39-70)   SpO2:  [97 %-99 %]     Intake/Output Summary (Last 24 hours) at 3/20/2019 1136  Last data filed at 3/20/2019 0615  Gross per 24 hour   Intake 720 ml   Output 7715 ml   Net -6995 ml       Constitutional: Appears well-developed and well-nourished.   Eyes: No scleral icterus or eye discharge  Cardiovascular: Normal heart rate.  Regular heart rhythm.  No murmur heard.  Pulmonary/Chest: Effort normal and breath sounds normal. No respiratory distress. No wheezes, rales, or rhonchi  Abdominal: Soft. Bowel sounds are normal.  Distension.  Fluid wave.  No tenderness  Musculoskeletal: No deformities.  BLE edema with chronic venous stasis  Neurological: Alert.  Oriented to person, place, and time.   Skin: Skin is warm and dry.       Assessment and Plan:    Mr. Jian Arrieta is a 63 y.o. male who presented to [facility] on 3/12/2019 with ascites.    Hospital Course:    Mr. Jian Arrieta was admitted to Hospital Medicine for management of his ascites.  Liver ultrasound was performed  which showed a cirrhotic liver with sequelae of portal hypertension, including severe volume ascites and splenomegaly.  Hepatology was consulted.  He had a paracentesis 3/13 that removed 10.8L, and another paracentesis on 3/15 that removed an additional 6L.  Transjugular liver biopsy was performed on 3/15, path pending.  After his procedure, he had an episode of chest pain with new afib with RVR.  He spontaneously converted. Troponin peaked at 3.  Cardiology was consulted and he was started on ACS protocol.  They believe that his troponin was elevated 2/2 demand ischemia from his afib.  He is tentatively scheduled to get a cath on 3/20 which was delayed 2/2 lack of cath and cardiac records from .    Ascites  Alcoholic Cirrhosis of Liver with Ascites  MELD-Na score: 13 at 3/20/2019  5:25 AM  MELD score: 12 at 3/20/2019  5:25 AM  Calculated from:  Serum Creatinine: 1.4 mg/dL at 3/20/2019  5:25 AM  Serum Sodium: 136 mmol/L at 3/20/2019  5:25 AM  Total Bilirubin: 1 mg/dL at 3/20/2019  5:25 AM  INR(ratio): 1.2 at 3/20/2019  5:25 AM  · Age: 63 years   · Paracentesis 3/13 removed 10.8L and another paracentesis on 3/15 that removed an additional 6L.  Repeat para 3.19 with 5.6L removed  · Transjugular liver biopsy was performed on 3/15, path pending  · Continue Lasix 40mg PO daily  · Continue Aldactone 100mg PO daily    NSTEMI  · Per Cardiology likely 2/2 demand ischemia from afib  · Troponin peaked at 3  · Contiune Heparin gtt for now.  HIT panel pending given dropping platelet count  · Continue Aspirin 81mg PO daily  · Continue Brilinta 90mg PO BID  · Continue Lipitor 40mg PO daily  · Continue Metoprolol 50mg PO BID  · Prn Nitro for chest pain  · Continue tele  · Interventional Cards consulted, appreciate assistance.  Plan for cath 3/20  · Records from  obtained and in chart    CAD  · Reports a history of 5 stents with CABG at  in 2017  · Scheduled for PET stress next week to evaluate patency  · Medications as  above    Paroxysmal AFib  · Likely hypovolemic in origin 2/2 large volume paracentesis x 2  · [id number] score: 3  · Continue tele  · Continue Heparin gtt.  Per Hepatology, no preference for NOAC/Warfarin after procedure.  Will need to discuss risk/benefit of bleeding as he is also on Aspirin  · Continue Metoprolol 50mg PO BID   · Cards consulted, appreciate assistance  · Continue Amio 400mg PO BID x 14 days then 200mg PO daily thereafter    Chronic Pancreatitis  · Chronic and stable  · Continue enzyme replacememnt    Essential HTN  · Baseline BP 90-100s, given cirrhosis BP unlikely to be higher  · Continue Metoprolol 50mg PO BID  · Continue Lasix 40mg PO daily  · Continue Aldactone 100mg PO daily  · Can consider restarting Ramipril post cath    Chronic Combined Systolic and Diastolic Heart Failure  · Chronic and stable  · Continue Metoprolol 50mg PO BID  · Continue Lasix 40mg PO daily  · Continue Aldactone 100mg PO daily    Type 2 DM with Long Term Use of Insulin and Diabetic Peripheral Neuropathy  · HbA1c 6.9 in Jan 2019, will repeat  · Home DM regimen:  Aspart 4 units TIDWM and Detemir 12 units daily  · Contiune Detemir 7 units  · SSI with POCT accuchecks and Diabetic diet    Anemia  · Hgb 9, stable for now  · S/p 1 unit PRBCs  · Goal hemoglobin 9 given CAD  · Monitor for bleeding with DAPT in the setting of liver disease    Morbid Obesity  Body mass index is 46.49 kg/m².  · Encourage diet, weight loss - can't exercise 2/2 LE edema      Diet:  NPO at midnight for cath then Low Sodium  GI PPx:  PPI  DVT PPx:  Heparin gtt  Goals of Care:  Full    High Risk Conditions:  Patient is currently on drug therapy requiring intensive monitoring for toxicity: Heparin gtt     Disposition:  Pending Cards recs likely home 3/21  Discharge Needs:  Cards follow up outpatient for afib and CAD.  Hepatology follow up for outpatient para.  HH PT.    Lorie Higuera MD  [facility] Medicine  Cell:  [phone]  Spectra:  [phone]  Pager:   [phone]

## 2019-03-20 NOTE — PLAN OF CARE
[facility]    HOME HEALTH ORDERS  FACE TO FACE ENCOUNTER    Patient Name: Jian Arrieta  YOB: 1955    PCP: Samir Mahmood MD   PCP Address: [address] [place] [id number]  PCP Phone Number: [phone]  PCP Fax: [phone]    Encounter Date: 03/20/2019    Admit to Home Health    Diagnoses:  Active Hospital Problems    Diagnosis  POA    *Ascites [R18.8]  Yes    NSTEMI (non-ST elevated myocardial infarction) [I21.4]  No    Paroxysmal atrial fibrillation [I48.0]  Yes    Anemia [D64.9]  Yes    Alcoholic cirrhosis of liver with ascites [K70.31]  Yes    ELIEZER (acute kidney injury) [N17.9]  Yes    DM type 2 with diabetic peripheral neuropathy [E11.42]  Yes    Chronic combined systolic and diastolic heart failure [I50.42]  Yes     1-28-19  Left Ventricle Moderate decreased ejection fraction at 30%. Normal left ventricular cavity size. Normal wall thickness observed. Grade I (mild) left ventricular diastolic dysfunction consistent with impaired relaxation. Normal left atrial pressure. Moderate, global hypokinesis(see wall scoring diagram).   Right Ventricle Normal cavity size, wall thickness and ejection fraction. Wall motion normal.   Left Atrium Normal atrial size. .   Right Atrium Normal atrial size.   Aortic Valve The valve is trileaflet. Aortic valve sclerosis is mild. No stenosis. No regurgitation. Mobility is normal   Mitral Valve Normal valve structure. No stenosis. No regurgitation. Normal leaflet mobility.   Tricuspid Valve Tricuspid valve not well visualized. Trace regurgitation.   Pulmonic Valve The pulmonic valve was not well visualized.   IVC/SVC Inferior vena cava is not well visualized.   Ascending Aorta The aortic root and ascending aorta are normal in size.   Pericardium No pericardial effusion.           Essential hypertension [I10]  Yes    Chronic pancreatitis [K86.1]  Yes    Coronary artery disease due to calcified coronary lesion [I25.10,  I25.84]  Yes     5 stents on ASA        Morbid obesity with BMI of 50.0-59.9, adult [E66.01, Z68.43]  Not Applicable      Resolved Hospital Problems   No resolved problems to display.       Future Appointments   Date Time Provider Department Center   4/22/2019  1:00 PM INJECTION, INFECTIOUS DISEASES [facility]   5/30/2019  1:30 PM Jaime Carney III, MD [facility] CARDIO [place]     Follow-up Information     Jaime Carney MD On 3/19/2019.    Specialties:  Cardiovascular Disease, Cardiology  Why:  at 2:30pm  Contact information:  [address]  [facility] [address]  [phone]             [facility] On 3/18/2019.    Specialty:  DME Provider  Why:  Call to questioned CPAP replacement.  Contact information:  [address]  [facility] [address]  [phone]             [facility] - Hepatology.    Specialty:  Hepatology  Contact information:  [address]  [facility] [id number] [phone]  Additional information:  1st Floor - [facility], located by clinic tower elevators                   I have seen and examined this patient face to face today. My clinical findings that support the need for the home health skilled services and home bound status are the following:  Weakness/numbness causing balance and gait disturbance due to Heart Failure, Coronary Heart Disease, Liver Disease and Weakness/Debility making it taxing to leave home.    Allergies:Review of patient's allergies indicates:  No Known Allergies    Diet: 2 gram sodium diet    Activities: activity as tolerated    Nursing:   SN to complete comprehensive assessment including routine vital signs. Instruct on disease process and s/s of complications to report to MD. Review/verify medication list sent home with the patient at time of discharge  and instruct patient/caregiver as needed. Frequency may be adjusted depending on start of care date.    Notify MD if SBP > 160 or < 90; DBP > 90 or <  50; HR > 120 or < 50; Temp > 101; Other:      CONSULTS:    Physical Therapy to evaluate and treat. Evaluate for home safety and equipment needs; Establish/upgrade home exercise program. Perform / instruct on therapeutic exercises, gait training, transfer training, and Range of Motion.    MISCELLANEOUS CARE:  N/A    WOUND CARE ORDERS  n/a      Medications: Review discharge medications with patient and family and provide education.      Current Discharge Medication List      START taking these medications    Details   !! amiodarone (PACERONE) 200 MG Tab Take 1 tablet (200 mg total) by mouth once daily.  Qty: 30 tablet, Refills: 11      !! amiodarone (PACERONE) 400 MG tablet Take 1 tablet (400 mg total) by mouth 2 (two) times daily. for 11 days  Qty: 22 tablet, Refills: 0      apixaban (ELIQUIS) 5 mg Tab Take 1 tablet (5 mg total) by mouth 2 (two) times daily.  Qty: 90 tablet, Refills: 3      clopidogrel (PLAVIX) 75 mg tablet Take 1 tablet (75 mg total) by mouth once daily.  Qty: 30 tablet, Refills: 11       !! - Potential duplicate medications found. Please discuss with provider.      CONTINUE these medications which have CHANGED    Details   ramipril (ALTACE) 2.5 MG capsule Take 1 capsule (2.5 mg total) by mouth once daily. Hold until Cardiology follow up    Associated Diagnoses: Chronic combined systolic and diastolic heart failure         CONTINUE these medications which have NOT CHANGED    Details   atorvastatin (LIPITOR) 40 MG tablet Take 1 tablet (40 mg total) by mouth once daily.  Qty: 90 tablet, Refills: 3    Associated Diagnoses: Mixed hyperlipidemia      cyanocobalamin, vitamin B-12, 500 mcg Subl Place 1 tablet under the tongue every Monday.       furosemide (LASIX) 40 MG tablet Take 1 tablet (40 mg total) by mouth once daily.  Qty: 90 tablet, Refills: 3    Associated Diagnoses: Chronic combined systolic and diastolic heart failure; Alcoholic cirrhosis of liver with ascites      insulin aspart U-100  (NOVOLOG) 100 unit/mL injection Inject 4 Units into the skin 3 (three) times daily before meals.      insulin detemir (LEVEMIR FLEXPEN SUBQ) Inject 12 Units into the skin once daily.       lipase-protease-amylase (CREON) 36,000-114,000- 180,000 unit CpDR Take 1 capsule by mouth 3 (three) times daily.  Qty: 270 capsule, Refills: 3    Associated Diagnoses: Alcohol-induced chronic pancreatitis      metoprolol succinate (TOPROL-XL) 100 MG 24 hr tablet Take 1 tablet (100 mg total) by mouth once daily.  Qty: 90 tablet, Refills: 3    Associated Diagnoses: Chronic combined systolic and diastolic heart failure      omeprazole (PRILOSEC) 40 MG capsule Take 40 mg by mouth once daily.  Refills: 8      skyrockitUCH ULTRA TEST Strp 4 (four) times daily. Use to test blood sugar four times a day.  Refills: 3      spironolactone (ALDACTONE) 100 MG tablet Take 1 tablet (100 mg total) by mouth once daily.  Qty: 90 tablet, Refills: 3    Associated Diagnoses: Chronic combined systolic and diastolic heart failure; Alcoholic cirrhosis of liver with ascites      tamsulosin (FLOMAX) 0.4 mg Cap Take 1 capsule by mouth once daily. Pt has not started taking as of 2/27/19.  Refills: 0      vitamin D (VITAMIN D3) 1000 units Tab Take 1 tablet (1,000 Units total) by mouth once daily.         STOP taking these medications       aspirin 325 MG tablet Comments:   Reason for Stopping:               I certify that this patient is confined to his home and needs intermittent skilled nursing care and physical therapy.

## 2019-03-21 ENCOUNTER — TELEPHONE (OUTPATIENT)
Dept: HEPATOLOGY | Facility: CLINIC | Age: 64
End: 2019-03-21

## 2019-03-21 VITALS
RESPIRATION RATE: 16 BRPM | SYSTOLIC BLOOD PRESSURE: 100 MMHG | BODY MASS INDEX: 46.6 KG/M2 | TEMPERATURE: 98 F | WEIGHT: 296.88 LBS | HEIGHT: 67 IN | DIASTOLIC BLOOD PRESSURE: 55 MMHG | OXYGEN SATURATION: 98 % | HEART RATE: 79 BPM

## 2019-03-21 DIAGNOSIS — R18.8 OTHER ASCITES: Primary | ICD-10-CM

## 2019-03-21 LAB
ALBUMIN SERPL BCP-MCNC: 2.8 G/DL
ALP SERPL-CCNC: 75 U/L
ALT SERPL W/O P-5'-P-CCNC: 11 U/L
ANION GAP SERPL CALC-SCNC: 6 MMOL/L
AST SERPL-CCNC: 14 U/L
BACTERIA SPEC ANAEROBE CULT: NORMAL
BILIRUB SERPL-MCNC: 0.8 MG/DL
BUN SERPL-MCNC: 20 MG/DL
CALCIUM SERPL-MCNC: 8.1 MG/DL
CHLORIDE SERPL-SCNC: 105 MMOL/L
CO2 SERPL-SCNC: 26 MMOL/L
CREAT SERPL-MCNC: 1.3 MG/DL
EST. GFR  (AFRICAN AMERICAN): >60 ML/MIN/1.73 M^2
EST. GFR  (NON AFRICAN AMERICAN): 57.7 ML/MIN/1.73 M^2
GLUCOSE SERPL-MCNC: 233 MG/DL
INR PPP: 1.2
PHOSPHATIDYLETHANOL (PETH): NEGATIVE NG/ML
POCT GLUCOSE: 201 MG/DL (ref 70–110)
POCT GLUCOSE: 269 MG/DL (ref 70–110)
POTASSIUM SERPL-SCNC: 3.8 MMOL/L
PROT SERPL-MCNC: 4.6 G/DL
PROTHROMBIN TIME: 12.4 SEC
SODIUM SERPL-SCNC: 137 MMOL/L

## 2019-03-21 PROCEDURE — 99239 HOSP IP/OBS DSCHRG MGMT >30: CPT | Mod: ,,, | Performed by: HOSPITALIST

## 2019-03-21 PROCEDURE — 99239 PR HOSPITAL DISCHARGE DAY,>30 MIN: ICD-10-PCS | Mod: ,,, | Performed by: HOSPITALIST

## 2019-03-21 PROCEDURE — 85610 PROTHROMBIN TIME: CPT

## 2019-03-21 PROCEDURE — 25000003 PHARM REV CODE 250: Performed by: HOSPITALIST

## 2019-03-21 PROCEDURE — 25000003 PHARM REV CODE 250: Performed by: NURSE PRACTITIONER

## 2019-03-21 PROCEDURE — 25000003 PHARM REV CODE 250: Performed by: PHYSICIAN ASSISTANT

## 2019-03-21 PROCEDURE — 80053 COMPREHEN METABOLIC PANEL: CPT

## 2019-03-21 PROCEDURE — 94761 N-INVAS EAR/PLS OXIMETRY MLT: CPT

## 2019-03-21 RX ADMIN — ATORVASTATIN CALCIUM 40 MG: 20 TABLET, FILM COATED ORAL at 09:03

## 2019-03-21 RX ADMIN — INSULIN DETEMIR 7 UNITS: 100 INJECTION, SOLUTION SUBCUTANEOUS at 09:03

## 2019-03-21 RX ADMIN — FUROSEMIDE 40 MG: 40 TABLET ORAL at 09:03

## 2019-03-21 RX ADMIN — METOPROLOL TARTRATE 50 MG: 50 TABLET ORAL at 09:03

## 2019-03-21 RX ADMIN — SPIRONOLACTONE 100 MG: 100 TABLET, FILM COATED ORAL at 09:03

## 2019-03-21 RX ADMIN — AMIODARONE HYDROCHLORIDE 400 MG: 200 TABLET ORAL at 09:03

## 2019-03-21 RX ADMIN — PANCRELIPASE 3 CAPSULE: 60000; 12000; 38000 CAPSULE, DELAYED RELEASE PELLETS ORAL at 09:03

## 2019-03-21 RX ADMIN — INSULIN ASPART 4 UNITS: 100 INJECTION, SOLUTION INTRAVENOUS; SUBCUTANEOUS at 09:03

## 2019-03-21 RX ADMIN — PANTOPRAZOLE SODIUM 40 MG: 40 TABLET, DELAYED RELEASE ORAL at 09:03

## 2019-03-21 RX ADMIN — APIXABAN 5 MG: 5 TABLET, FILM COATED ORAL at 09:03

## 2019-03-21 RX ADMIN — CLOPIDOGREL 75 MG: 75 TABLET, FILM COATED ORAL at 09:03

## 2019-03-21 NOTE — DISCHARGE SUMMARY
Hospital Medicine  Discharge Summary      Patient Name: Jian Arrieta  MRN:  [id number]  Hospital Medicine Team: [facility] [place] [facility]P MED V Lorie Higuera MD  Date of Admission:  3/12/2019     Date of Discharge:  03/21/2019  Length of Stay:  LOS: 6 days   Principal Problem:  Ascites     Active Hospital Problems    Diagnosis  POA    *Ascites [R18.8]  Yes    NSTEMI (non-ST elevated myocardial infarction) [I21.4]  No    Paroxysmal atrial fibrillation [I48.0]  Yes    Anemia [D64.9]  Yes    Alcoholic cirrhosis of liver with ascites [K70.31]  Yes    ELIEZER (acute kidney injury) [N17.9]  Yes    DM type 2 with diabetic peripheral neuropathy [E11.42]  Yes    Chronic combined systolic and diastolic heart failure [I50.42]  Yes     1-28-19  Left Ventricle Moderate decreased ejection fraction at 30%. Normal left ventricular cavity size. Normal wall thickness observed. Grade I (mild) left ventricular diastolic dysfunction consistent with impaired relaxation. Normal left atrial pressure. Moderate, global hypokinesis(see wall scoring diagram).   Right Ventricle Normal cavity size, wall thickness and ejection fraction. Wall motion normal.   Left Atrium Normal atrial size. .   Right Atrium Normal atrial size.   Aortic Valve The valve is trileaflet. Aortic valve sclerosis is mild. No stenosis. No regurgitation. Mobility is normal   Mitral Valve Normal valve structure. No stenosis. No regurgitation. Normal leaflet mobility.   Tricuspid Valve Tricuspid valve not well visualized. Trace regurgitation.   Pulmonic Valve The pulmonic valve was not well visualized.   IVC/SVC Inferior vena cava is not well visualized.   Ascending Aorta The aortic root and ascending aorta are normal in size.   Pericardium No pericardial effusion.           Essential hypertension [I10]  Yes    Chronic pancreatitis [K86.1]  Yes    Coronary artery disease due to calcified coronary lesion [I25.10, I25.84]  Yes     5 stents on ASA        Morbid obesity with BMI of  50.0-59.9, adult [E66.01, Z68.43]  Not Applicable      Resolved Hospital Problems   No resolved problems to display.          History of Present Illness:  Patient is a 64yo male with a PMHx of CAD (s/p PCI with 5 stents) with subsequent CABG x 3v (2017 at ), HLD, DM2, BERHANE on CPAP, EtOh cirrhosis being admitted to observation for shortness of breath due to ascites. Patient reports worsening shortness of breath for the last 4 weeks with associated LE edema and belly distension. He reports having a therapeutic paracentesis 4 weeks ago which removed 5L of fluid.  His body swelling and SOB improved temporarily then started to worsen again. Now he is SOB with minimal exertion. Patient at his baseline is able to walk at his house from one end to the other. Now he is unable to ambulate three steps without getting SOB. He reports associated exertional chest pain feels like heaviness. He denies LOC, orthopnea and PND. He denies fever, cough, chills, rigors and weight gain. Patient states chronic constipation and hard stool and relates it to iron intake. His wife reports that sometime he becomes confused. He reports compliance to his medications.     In the ED, vitals stable. Intake labs remarkable for Na 130, Cr 1.6. Troponin 0.006 x 2. CTA showed abdominal ascites and stable pancreatic findings.        Hospital Course of Principle Problem Addressed:  Mr. Jian Arrieta was admitted to Hospital Medicine for management of his ascites.  Liver ultrasound was performed which showed a cirrhotic liver with sequelae of portal hypertension, including severe volume ascites and splenomegaly.  Hepatology was consulted.  He had a paracentesis 3/13 that removed 10.8L, and another paracentesis on 3/15 that removed an additional 6L.  Transjugular liver biopsy was performed on 3/15, path pending.  After his procedure, he had an episode of chest pain with new afib with RVR.  He spontaneously converted. Troponin peaked at 3.  Cardiology was  consulted and he was started on ACS protocol.  They believe that his troponin was elevated 2/2 demand ischemia from his afib.  He had a cath on 3/20 which was negative.  Cards suggested DC on Eliquis and Plavix.      Other Medical Problems Addressed in the Hospital:      Ascites  Alcoholic Cirrhosis of Liver with Ascites  · MELD-Na score: 11 at 3/21/2019  4:19 AM  · MELD score: 11 at 3/21/2019  4:19 AM  · Calculated from:  · Serum Creatinine: 1.3 mg/dL at 3/21/2019  4:19 AM  · Serum Sodium: 137 mmol/L at 3/21/2019  4:19 AM  · Total Bilirubin: 0.8 mg/dL (Rounded to 1 mg/dL) at 3/21/2019  4:19 AM  · INR(ratio): 1.2 at 3/21/2019  4:19 AM  · Age: 63 years   · Paracentesis 3/13 removed 10.8L and another paracentesis on 3/15 that removed an additional 6L.  Repeat para 3.19 with 5.6L removed.  Likely to need scheduled outpatient rohit  · Transjugular liver biopsy was performed on 3/15, path pending  · Continue Lasix 40mg PO daily  · Continue Aldactone 100mg PO daily  · Hep follow up outpatient     NSTEMI  · Per Cardiology likely 2/2 demand ischemia from afib  · Troponin peaked at 3  · Transitioned from Heparin to Eliquis  · Stop Aspirin  · Change Brilinta to Plavix per Cards recs  · Continue Lipitor 40mg PO daily  · Continue Metoprolol 50mg PO BID  · Prn Nitro for chest pain  · Continue tele  · Interventional Cards consulted, appreciate assistance.  Plan for cath 3/20  · Records from  obtained and in chart     CAD  · Reports a history of 5 stents with CABG at  in 2017  · Scheduled for PET stress next week to evaluate patency  · Medications as above     Paroxysmal AFib  · Likely hypovolemic in origin 2/2 large volume paracentesis x 2  · [id number] score: 3  · Continue tele  · Stop Heparin and change to Eliquis. Per Hepatology, no preference for NOAC/Warfarin after procedure.    · Continue Metoprolol 50mg PO BID   · Cards consulted, appreciate assistance  · Continue Amio 400mg PO BID x 14 days then 200mg PO daily  thereafter     Chronic Pancreatitis  · Chronic and stable  · Continue enzyme replacememnt     Essential HTN  · Baseline BP 90-100s, given cirrhosis BP unlikely to be higher  · Continue Metoprolol 50mg PO BID  · Continue Lasix 40mg PO daily  · Continue Aldactone 100mg PO daily  · Can consider restarting Ramipril post cath     Chronic Combined Systolic and Diastolic Heart Failure  · Chronic and stable  · Continue Metoprolol 50mg PO BID  · Continue Lasix 40mg PO daily  · Continue Aldactone 100mg PO daily     Type 2 DM with Long Term Use of Insulin and Diabetic Peripheral Neuropathy  · HbA1c 6.9 in Jan 2019, will repeat  · Home DM regimen:  Aspart 4 units TIDWM and Detemir 12 units daily  · Contiune Detemir 7 units  · SSI with POCT accuchecks and Diabetic diet     Anemia  · Hgb 9, stable for now  · S/p 1 unit PRBCs  · Goal hemoglobin 9 given CAD  · Monitor for bleeding with DAPT in the setting of liver disease     Morbid Obesity  · Body mass index is 46.49 kg/m².  · Encourage diet, weight loss - can't exercise 2/2 LE edema         Significant Diagnostic Tests/Imaging:    Recent Labs   Lab 03/17/19 0440 03/18/19 0337 03/19/19 0443   WBC 4.58 4.76 4.67   HGB 8.9* 9.7* 9.0*   HCT 27.0* 29.1* 27.8*    193 167     Recent Labs   Lab 03/15/19  2019  03/19/19  0443 03/20/19  0525 03/21/19  0419      < > 136 136 137   K 4.3   < > 3.8 3.7 3.8      < > 105 104 105   CO2 22*   < > 28 27 26   BUN 22   < > 22 21 20   CREATININE 1.3   < > 1.2 1.4 1.3   *   < > 194* 304* 233*   CALCIUM 8.6*   < > 8.0* 8.1* 8.1*   MG 1.6  --   --   --   --    PHOS 3.1  --   --   --   --     < > = values in this interval not displayed.     Recent Labs   Lab 03/19/19 0443 03/20/19 0525 03/21/19 0419   ALKPHOS 68 63 75   ALT 13 11 11   AST 15 10 14   ALBUMIN 2.8* 3.2* 2.8*   PROT 4.5* 4.7* 4.6*   BILITOT 0.7 1.0 0.8   INR 1.2 1.2 1.2      Recent Labs   Lab 03/19/19  2204 03/20/19  0106 03/20/19  0812 03/20/19  1227  03/21/19  0446 03/21/19  0812   POCTGLUCOSE 416* 347* 301* 165* 269* 201*     No results for input(s): CPK, CPKMB, MB, TROPONINI in the last 72 hours.    No results for input(s): LACTATE in the last 72 hours.       Significant Treatments/Procedures:  [facility] with Interventional Cards, Transjugular liver biopsy with IR        Consultants:  Hepatology, Cardiology        Discharge Medications:    Discharge Medication List as of 3/21/2019 10:24 AM      START taking these medications    Details   !! amiodarone (PACERONE) 200 MG Tab Take 1 tablet (200 mg total) by mouth once daily., Starting Mon 4/1/2019, Until Tue 3/31/2020, Normal      !! amiodarone (PACERONE) 400 MG tablet Take 1 tablet (400 mg total) by mouth 2 (two) times daily. for 11 days, Starting Wed 3/20/2019, Until Sun 3/31/2019, Normal      apixaban (ELIQUIS) 5 mg Tab Take 1 tablet (5 mg total) by mouth 2 (two) times daily., Starting Wed 3/20/2019, Normal      clopidogrel (PLAVIX) 75 mg tablet Take 1 tablet (75 mg total) by mouth once daily., Starting Thu 3/21/2019, Until Fri 3/20/2020, Normal       !! - Potential duplicate medications found. Please discuss with provider.      CONTINUE these medications which have CHANGED    Details   ramipril (ALTACE) 2.5 MG capsule Take 1 capsule (2.5 mg total) by mouth once daily. Hold until Cardiology follow up, Starting Wed 3/20/2019, No Print         CONTINUE these medications which have NOT CHANGED    Details   atorvastatin (LIPITOR) 40 MG tablet Take 1 tablet (40 mg total) by mouth once daily., Starting Mon 2/4/2019, Until Tue 2/4/2020, Normal      cyanocobalamin, vitamin B-12, 500 mcg Subl Place 1 tablet under the tongue every Monday. , Historical Med      furosemide (LASIX) 40 MG tablet Take 1 tablet (40 mg total) by mouth once daily., Starting Mon 2/4/2019, Until Tue 2/4/2020, Normal      insulin aspart U-100 (NOVOLOG) 100 unit/mL injection Inject 4 Units into the skin 3 (three) times daily before meals., Historical Med       insulin detemir (LEVEMIR FLEXPEN SUBQ) Inject 12 Units into the skin once daily. , Historical Med      lipase-protease-amylase (CREON) 36,000-114,000- 180,000 unit CpDR Take 1 capsule by mouth 3 (three) times daily., Starting Mon 2/4/2019, Normal      metoprolol succinate (TOPROL-XL) 100 MG 24 hr tablet Take 1 tablet (100 mg total) by mouth once daily., Starting Mon 2/4/2019, Until Tue 2/4/2020, Normal      omeprazole (PRILOSEC) 40 MG capsule Take 40 mg by mouth once daily., Starting Mon 2/18/2019, Historical Med      ONETOUCH ULTRA TEST Strp 4 (four) times daily. Use to test blood sugar four times a day., Starting 10/15/2015, Until Discontinued, Historical Med      spironolactone (ALDACTONE) 100 MG tablet Take 1 tablet (100 mg total) by mouth once daily., Starting Mon 2/4/2019, Normal      tamsulosin (FLOMAX) 0.4 mg Cap Take 1 capsule by mouth once daily. Pt has not started taking as of 2/27/19., Starting Tue 2/5/2019, Historical Med      vitamin D (VITAMIN D3) 1000 units Tab Take 1 tablet (1,000 Units total) by mouth once daily., Starting Thu 1/31/2019, OTC         STOP taking these medications       aspirin 325 MG tablet Comments:   Reason for Stopping:         aspirin 81 MG Chew Comments:   Reason for Stopping:               Discharge Diet:  2 gram sodium diet     Activity: activity as tolerated    Discharge Condition: Stable    Disposition: Home-Health Care [facility] [place]     Time spent on the discharge of the patient including review of hospital course with the patient. reviewing discharge medications and arranging follow-up care 35 minutes.  Patient was seen and examined on the date of discharge and determined to be suitable for discharge.    Future Appointments   Date Time Provider Department Center   4/4/2019  1:30 PM Jaime Carney III, MD [facility] CARDIO [place]   4/22/2019  1:00 PM [facility] Mando Beltranlarry Higuera MD  [facility] Medicine  Cell:  [phone]  Spectra:   [phone]  Pager:  [phone]

## 2019-03-21 NOTE — PLAN OF CARE
03/21/19 0925   Post-Acute Status   Post-Acute Authorization Home Health/Hospice   Home Health/Hospice Status Set-up Complete     SW sent orders to [facility].  They accepted the pt and will see him tomorrow.    Maryse Ortez, GINO  [phone]

## 2019-03-21 NOTE — PLAN OF CARE
Problem: Adult Inpatient Plan of Care  Goal: Plan of Care Review  Outcome: Ongoing (interventions implemented as appropriate)   03/21/19 0549   Plan of Care Review   Plan of Care Reviewed With patient;spouse   Progress improving     Patient AAO X $ and denies pain.  Ambulates well and no c/o dizziness.  Angiogram completed on 3/20/19 and results to chart.  Dressing to R groin D/I and pulses +.  Blood glucose elevated and last Blood glucose 269 this am.   Heparin dc'd and bridged to eliquis 5 mg daily. CBC every 72 hours.    Patient slept well and hoping to be discharged home today.   Spouse remain at bedside.  Free from fall and injuries

## 2019-03-21 NOTE — PLAN OF CARE
Previously scheduled appointment with Dr. Carney (cards) on 3/19/19 at 1430, which the patient missed due to his hospitalization, rescheduled for 4/4/19 at 1330. Plan to discharge patient home with [facility] today. Will continue to follow.

## 2019-03-21 NOTE — NURSING
Discharged instructions given to the patient and wife and verbalized understanding. Picc line d/c'ed earlier by nurse Reyna with tip intact. No further questions arise. Patient discharged home with all his belongings accompanied by his wife.

## 2019-03-21 NOTE — TELEPHONE ENCOUNTER
----- Message from Shayy Feng sent at 3/21/2019  2:50 PM CDT -----  Contact: Geri pt's wife  Needs Advice    Reason for call: Calling to schedule a hospital f/u appt for the pt        Communication Preference: [phone] or Home: [phone]    Additional Information: N/A

## 2019-03-21 NOTE — TELEPHONE ENCOUNTER
Call placed to Geri, wife of the patient.   Jian answered and stated she was running errands.  Informed that we are returning the call to Geri to set up your follow up appt to the clinic.  (Aleshia Keithogyuniel asked how soon she wanted to see the patien? She stated Sisi is working on scheduling a Para appt. We will bring the patient in on the same day.)    Patient informed that we are trying to arrange for a Para and F/U on the same day.  We will get back with Geri when it is scheduled.  Well, You know I will need it done every 4 days.  Patient informed we are arranging for the 1st one and Aleshia will let you know the intervals for the para there after.  Voiced understanding.

## 2019-03-21 NOTE — PLAN OF CARE
03/21/19 1452   Final Note   Assessment Type Final Discharge Note     Patient discharged home with [facility] on 3/21/19.

## 2019-03-21 NOTE — CONSULTS
"Cardiac Rehab     Jian Arrieta   [id number]   3/21/2019       Activity taught: Yes    Cardiac Rehab Phase Taught: Phase I & II    Risk Factors-Modifiable: diabetes, hyperlipidemia, hypertension, obesity, sedentary lifestyle, stress, exercise, BERHANE    Risk Factors-Non modifiable: age, gender    Teaching Method: Verbal, Written, Living with Heart Disease    Understanding: Yes    Response: Yes, but would like to discuss with Primary cardiologist first.    Comments: S/P NSTEMI 03/18/2019. Discussed cardiac rehab and risk factor modification. Educational materials were used in the process and given to the patient. They included "Your Guide to Living with Heart Disease", Phase Two Cardiac Rehabilitation information along with a sample Mediterranean diet.The patient expressed understanding of the teaching and expressed desire to take a role in modifying the risk factors when they return home.      Jaiden Van RN  Cardiac Rehab  "

## 2019-03-21 NOTE — PROGRESS NOTES
Hospital Medicine  Progress Note      Patient Name: Jian Arrieta  MRN:  [id number]  [facility] Medicine [place] [facility] [place] [place] V Lorie Higuera MD  Date of Admission:  3/12/2019     Length of Stay:  LOS: 6 days   Expected Discharge Date: 3/21/2019  Principal Problem:  Ascites      Subjective:    Interval History/Overnight Events:  [facility] with Interventional Cards with no PCI.  Cards suggests Eliquis and Plavix on DC.  Reports feeling well and ready to go home.  Leg swelling much improved.        Review of Systems:  Constitutional: Negative for chills, fatigue, fever.   Respiratory: Negative for cough, shortness of breath.    Cardiovascular: + leg swelling.   Gastrointestinal: Negative for abdominal pain, constipation, diarrhea, nausea, vomiting.   Genitourinary: Negative for dysuria, frequency.   All other systems reviewed and are negative.      Objective:    Physical Exam:  Temp:  [97.3 °F (36.3 °C)-98.8 °F (37.1 °C)]   Pulse:  [68-88]   Resp:  [16-19]   BP: (100-120)/(55-74)   SpO2:  [97 %-100 %]     Intake/Output Summary (Last 24 hours) at 3/21/2019 1400  Last data filed at 3/21/2019 0900  Gross per 24 hour   Intake 1200 ml   Output 1775 ml   Net -575 ml       Constitutional: Appears well-developed and well-nourished.   Eyes: No scleral icterus or eye discharge  Cardiovascular: Normal heart rate.  Regular heart rhythm.  No murmur heard.  Pulmonary/Chest: Effort normal and breath sounds normal. No respiratory distress. No wheezes, rales, or rhonchi  Abdominal: Soft. Bowel sounds are normal.  Distension.  Fluid wave.  No tenderness  Musculoskeletal: No deformities.  BLE edema with chronic venous stasis that has improved  Neurological: Alert.  Oriented to person, place, and time.   Skin: Skin is warm and dry.       Assessment and Plan:    Mr. Jian Arrieta is a 63 y.o. male who presented to [facility] on 3/12/2019 with ascites.    Hospital Course:    Mr. Jian Arrieta was admitted to [facility] Medicine for management of his  ascites.  Liver ultrasound was performed which showed a cirrhotic liver with sequelae of portal hypertension, including severe volume ascites and splenomegaly.  Hepatology was consulted.  He had a paracentesis 3/13 that removed 10.8L, and another paracentesis on 3/15 that removed an additional 6L.  Transjugular liver biopsy was performed on 3/15, path pending.  After his procedure, he had an episode of chest pain with new afib with RVR.  He spontaneously converted. Troponin peaked at 3.  Cardiology was consulted and he was started on ACS protocol.  They believe that his troponin was elevated 2/2 demand ischemia from his afib.  He had a cath on 3/20 which was negative.  Cards suggested DC on Eliquis and Plavix.    Ascites  Alcoholic Cirrhosis of Liver with Ascites  MELD-Na score: 11 at 3/21/2019  4:19 AM  MELD score: 11 at 3/21/2019  4:19 AM  Calculated from:  Serum Creatinine: 1.3 mg/dL at 3/21/2019  4:19 AM  Serum Sodium: 137 mmol/L at 3/21/2019  4:19 AM  Total Bilirubin: 0.8 mg/dL (Rounded to 1 mg/dL) at 3/21/2019  4:19 AM  INR(ratio): 1.2 at 3/21/2019  4:19 AM  · Age: 63 years   · Paracentesis 3/13 removed 10.8L and another paracentesis on 3/15 that removed an additional 6L.  Repeat para 3.19 with 5.6L removed  · Transjugular liver biopsy was performed on 3/15, path pending  · Continue Lasix 40mg PO daily  · Continue Aldactone 100mg PO daily    NSTEMI  · Per Cardiology likely 2/2 demand ischemia from afib  · Troponin peaked at 3  · Transitioned from Heparin to Eliquis  · Stop Aspirin  · Change Brilinta to Plavix per Cards recs  · Continue Lipitor 40mg PO daily  · Continue Metoprolol 50mg PO BID  · Prn Nitro for chest pain  · Continue tele  · Interventional Cards consulted, appreciate assistance.  Plan for cath 3/20  · Records from  obtained and in chart    CAD  · Reports a history of 5 stents with CABG at  in 2017  · Scheduled for PET stress next week to evaluate patency  · Medications as above    Paroxysmal  AFib  · Likely hypovolemic in origin 2/2 large volume paracentesis x 2  · [id number] score: 3  · Continue tele  · Stop Heparin and change to Eliquis. Per Hepatology, no preference for NOAC/Warfarin after procedure.    · Continue Metoprolol 50mg PO BID   · Cards consulted, appreciate assistance  · Continue Amio 400mg PO BID x 14 days then 200mg PO daily thereafter    Chronic Pancreatitis  · Chronic and stable  · Continue enzyme replacememnt    Essential HTN  · Baseline BP 90-100s, given cirrhosis BP unlikely to be higher  · Continue Metoprolol 50mg PO BID  · Continue Lasix 40mg PO daily  · Continue Aldactone 100mg PO daily  · Can consider restarting Ramipril post cath    Chronic Combined Systolic and Diastolic Heart Failure  · Chronic and stable  · Continue Metoprolol 50mg PO BID  · Continue Lasix 40mg PO daily  · Continue Aldactone 100mg PO daily    Type 2 DM with Long Term Use of Insulin and Diabetic Peripheral Neuropathy  · HbA1c 6.9 in Jan 2019, will repeat  · Home DM regimen:  Aspart 4 units TIDWM and Detemir 12 units daily  · Contiune Detemir 7 units  · SSI with POCT accuchecks and Diabetic diet    Anemia  · Hgb 9, stable for now  · S/p 1 unit PRBCs  · Goal hemoglobin 9 given CAD  · Monitor for bleeding with DAPT in the setting of liver disease    Morbid Obesity  Body mass index is 46.49 kg/m².  · Encourage diet, weight loss - can't exercise 2/2 LE edema      Diet:  Low Sodium  GI PPx:  PPI  DVT PPx:  Heparin gtt  Goals of Care:  Full    High Risk Conditions:  Patient is currently on drug therapy requiring intensive monitoring for toxicity: Heparin gtt     Disposition:  Home today  Discharge Needs:  Cards follow up outpatient for afib and CAD.  Hepatology follow up for outpatient para.  HH PT.    Lorie Higuera MD  [facility] Medicine  Cell:  [phone]  Spectra:  [phone]  Pager:  [phone]

## 2019-03-22 LAB — BACTERIA SPEC ANAEROBE CULT: NORMAL

## 2019-03-22 NOTE — PT/OT/SLP DISCHARGE
Physical Therapy Discharge Summary    Name: Jian Arrieta  MRN: [id number]   Principal Problem: Ascites     Patient Discharged from acute Physical Therapy on 3/21/2019.  Please refer to prior PT noted date on 3/20/2019 for functional status.     Assessment:     Goals partially met.    Objective:     GOALS:   Multidisciplinary Problems     Physical Therapy Goals     Not on file          Multidisciplinary Problems (Resolved)        Problem: Physical Therapy Goal    Goal Priority Disciplines Outcome Goal Variances Interventions   Physical Therapy Goal   (Resolved)     PT, PT/OT Outcome(s) achieved     Description:  Goals to be met by: 3/24/19     Patient will increase functional independence with mobility by performin. Supine to sit with Modified [place] -Met 3/20/2019   2. Gait  x 100 feet with Supervision using Rolling Walker or LRD -Met 3/20/2019   3. Ascend/descend 3 stair with right Handrails Contact Guard Assistance using Rolling Walker or LRD.   4. Stand for 10 minutes with Contact Guard Assistance using Rolling Walker or LRD to prepare for standing ADLs.                          Reasons for Discontinuation of Therapy Services  Transfer to alternate level of care.      Plan:     Patient Discharged to: Home with Home Health Service.    Selvin Daley, PT  3/22/2019

## 2019-03-26 ENCOUNTER — TELEPHONE (OUTPATIENT)
Dept: INTERVENTIONAL RADIOLOGY/VASCULAR | Facility: HOSPITAL | Age: 64
End: 2019-03-26

## 2019-03-26 LAB
POCT GLUCOSE: 153 MG/DL (ref 70–110)
POCT GLUCOSE: 228 MG/DL (ref 70–110)

## 2019-03-27 ENCOUNTER — TELEPHONE (OUTPATIENT)
Dept: CARDIOLOGY | Facility: CLINIC | Age: 64
End: 2019-03-27

## 2019-03-27 ENCOUNTER — OFFICE VISIT (OUTPATIENT)
Dept: HEPATOLOGY | Facility: CLINIC | Age: 64
End: 2019-03-27
Payer: COMMERCIAL

## 2019-03-27 ENCOUNTER — HOSPITAL ENCOUNTER (OUTPATIENT)
Dept: INTERVENTIONAL RADIOLOGY/VASCULAR | Facility: HOSPITAL | Age: 64
Discharge: HOME | End: 2019-03-27
Attending: NURSE PRACTITIONER
Payer: COMMERCIAL

## 2019-03-27 VITALS
HEART RATE: 65 BPM | OXYGEN SATURATION: 98 % | RESPIRATION RATE: 18 BRPM | SYSTOLIC BLOOD PRESSURE: 91 MMHG | DIASTOLIC BLOOD PRESSURE: 54 MMHG

## 2019-03-27 VITALS
DIASTOLIC BLOOD PRESSURE: 62 MMHG | HEART RATE: 65 BPM | HEIGHT: 67 IN | BODY MASS INDEX: 43.16 KG/M2 | SYSTOLIC BLOOD PRESSURE: 90 MMHG | WEIGHT: 275 LBS | OXYGEN SATURATION: 98 %

## 2019-03-27 DIAGNOSIS — R18.8 OTHER ASCITES: Primary | ICD-10-CM

## 2019-03-27 DIAGNOSIS — E66.01 MORBID OBESITY WITH BMI OF 50.0-59.9, ADULT: ICD-10-CM

## 2019-03-27 DIAGNOSIS — N17.9 AKI (ACUTE KIDNEY INJURY): Primary | ICD-10-CM

## 2019-03-27 DIAGNOSIS — R18.8 OTHER ASCITES: ICD-10-CM

## 2019-03-27 DIAGNOSIS — I50.42 CHRONIC COMBINED SYSTOLIC AND DIASTOLIC HEART FAILURE: ICD-10-CM

## 2019-03-27 DIAGNOSIS — I89.0 LYMPHEDEMA OF BOTH LOWER EXTREMITIES: ICD-10-CM

## 2019-03-27 DIAGNOSIS — K86.3 PSEUDOCYST OF PANCREAS: ICD-10-CM

## 2019-03-27 LAB
ALBUMIN FLD-MCNC: 1.5 G/DL
APPEARANCE FLD: NORMAL
BODY FLD TYPE: NORMAL
COLOR FLD: YELLOW
LYMPHOCYTES NFR FLD MANUAL: 59 %
MESOTHL CELL NFR FLD MANUAL: 4 %
MONOS+MACROS NFR FLD MANUAL: 13 %
NEUTROPHILS NFR FLD MANUAL: 24 %
PROT FLD-MCNC: 2.4 G/DL
SPECIMEN SOURCE: NORMAL
SPECIMEN SOURCE: NORMAL
WBC # FLD: 228 /CU MM

## 2019-03-27 PROCEDURE — 3008F BODY MASS INDEX DOCD: CPT | Mod: CPTII,S$GLB,, | Performed by: NURSE PRACTITIONER

## 2019-03-27 PROCEDURE — 99214 OFFICE O/P EST MOD 30 MIN: CPT | Mod: S$GLB,,, | Performed by: NURSE PRACTITIONER

## 2019-03-27 PROCEDURE — 87070 CULTURE OTHR SPECIMN AEROBIC: CPT

## 2019-03-27 PROCEDURE — 88305 CYTOLOGY SPECIMEN- MEDICAL CYTOLOGY (FLUID/WASH/BRUSH): ICD-10-PCS | Mod: 26,,, | Performed by: PATHOLOGY

## 2019-03-27 PROCEDURE — 88112 CYTOLOGY SPECIMEN- MEDICAL CYTOLOGY (FLUID/WASH/BRUSH): ICD-10-PCS | Mod: 26,,, | Performed by: PATHOLOGY

## 2019-03-27 PROCEDURE — 99499 RISK ADDL DX/OHS AUDIT: ICD-10-PCS | Mod: S$GLB,,, | Performed by: NURSE PRACTITIONER

## 2019-03-27 PROCEDURE — 99214 PR OFFICE/OUTPT VISIT, EST, LEVL IV, 30-39 MIN: ICD-10-PCS | Mod: S$GLB,,, | Performed by: NURSE PRACTITIONER

## 2019-03-27 PROCEDURE — 49083 ABD PARACENTESIS W/IMAGING: CPT | Mod: ,,, | Performed by: NURSE PRACTITIONER

## 2019-03-27 PROCEDURE — 84157 ASSAY OF PROTEIN OTHER: CPT

## 2019-03-27 PROCEDURE — 3078F DIAST BP <80 MM HG: CPT | Mod: CPTII,S$GLB,, | Performed by: NURSE PRACTITIONER

## 2019-03-27 PROCEDURE — 3074F PR MOST RECENT SYSTOLIC BLOOD PRESSURE < 130 MM HG: ICD-10-PCS | Mod: CPTII,S$GLB,, | Performed by: NURSE PRACTITIONER

## 2019-03-27 PROCEDURE — 88305 TISSUE EXAM BY PATHOLOGIST: CPT | Performed by: PATHOLOGY

## 2019-03-27 PROCEDURE — 3074F SYST BP LT 130 MM HG: CPT | Mod: CPTII,S$GLB,, | Performed by: NURSE PRACTITIONER

## 2019-03-27 PROCEDURE — 49083 IR PARACENTESIS WITH IMAGING: ICD-10-PCS | Mod: ,,, | Performed by: NURSE PRACTITIONER

## 2019-03-27 PROCEDURE — 87075 CULTR BACTERIA EXCEPT BLOOD: CPT

## 2019-03-27 PROCEDURE — 63600175 PHARM REV CODE 636 W HCPCS: Mod: JG | Performed by: NURSE PRACTITIONER

## 2019-03-27 PROCEDURE — 89051 BODY FLUID CELL COUNT: CPT

## 2019-03-27 PROCEDURE — P9047 ALBUMIN (HUMAN), 25%, 50ML: HCPCS | Mod: JG | Performed by: NURSE PRACTITIONER

## 2019-03-27 PROCEDURE — 88112 CYTOPATH CELL ENHANCE TECH: CPT | Mod: 26,,, | Performed by: PATHOLOGY

## 2019-03-27 PROCEDURE — 99999 PR PBB SHADOW E&M-EST. PATIENT-LVL V: CPT | Mod: PBBFAC,,, | Performed by: NURSE PRACTITIONER

## 2019-03-27 PROCEDURE — 99499 UNLISTED E&M SERVICE: CPT | Mod: S$GLB,,, | Performed by: NURSE PRACTITIONER

## 2019-03-27 PROCEDURE — 3008F PR BODY MASS INDEX (BMI) DOCUMENTED: ICD-10-PCS | Mod: CPTII,S$GLB,, | Performed by: NURSE PRACTITIONER

## 2019-03-27 PROCEDURE — 49083 ABD PARACENTESIS W/IMAGING: CPT

## 2019-03-27 PROCEDURE — 99999 PR PBB SHADOW E&M-EST. PATIENT-LVL V: ICD-10-PCS | Mod: PBBFAC,,, | Performed by: NURSE PRACTITIONER

## 2019-03-27 PROCEDURE — 82042 OTHER SOURCE ALBUMIN QUAN EA: CPT

## 2019-03-27 PROCEDURE — 3078F PR MOST RECENT DIASTOLIC BLOOD PRESSURE < 80 MM HG: ICD-10-PCS | Mod: CPTII,S$GLB,, | Performed by: NURSE PRACTITIONER

## 2019-03-27 RX ORDER — ALBUMIN HUMAN 250 G/1000ML
25 SOLUTION INTRAVENOUS ONCE
Status: COMPLETED | OUTPATIENT
Start: 2019-03-27 | End: 2019-03-27

## 2019-03-27 RX ADMIN — ALBUMIN (HUMAN) 25 G: 25 SOLUTION INTRAVENOUS at 01:03

## 2019-03-27 NOTE — PROGRESS NOTES
Paracentesis complete. 2400 mLs peritoneal fluid drained. Pt tolerated well. Dressing to RLQ clean, dry, and intact. Albumin 100mL required per protocol.  Discharge instructions and handouts provided. Pt verbalized understanding.

## 2019-03-27 NOTE — H&P
Radiology History & Physical      SUBJECTIVE:     Chief Complaint: abdominal distention    History of Present Illness:  Jian Arrieta is a 63 y.o. male who presents for evaluation of ascites  Past Medical History:   Diagnosis Date    Alcohol abuse     Anasarca 1/28/2019    Arthropathy associated with neurological disorder 9/2/2015    Atherosclerosis     Charcot foot due to diabetes mellitus     Chronic combined systolic and diastolic heart failure 01/29/2019 1-28-19 Left VentricleModerate decreased ejection fraction at 30%. Normal left ventricular cavity size. Normal wall thickness observed. Grade I (mild) left ventricular diastolic dysfunction consistent with impaired relaxation. Normal left atrial pressure. Moderate, global hypokinesis(see wall scoring diagram). Right VentricleNormal cavity size, wall thickness and ejection fraction. Wall motion n    Chronic pancreatitis 1/28/2019    Cirrhosis of liver with ascites 1/28/2019    Colon polyps     approx 5 yrs ago    Coronary artery disease due to calcified coronary lesion 05/08/2015    5 stents on ASA      Diabetic polyneuropathy associated with type 2 diabetes mellitus 9/2/2015    Diverticulosis 1/28/2019    DM type 2 with diabetic peripheral neuropathy 2/4/2019    Essential hypertension 1/28/2019    Former smoker 8/26/2015    Healed ulcer of left foot on examination 6/20/2017    Hydrocele     approx 1.5 yrs ago    Hypoalbuminemia 2/4/2019    Lymphedema of both lower extremities 1/29/2019    Mixed hyperlipidemia 5/8/2015    Morbid obesity with BMI of 50.0-59.9, adult 5/8/2015    Onychomycosis of multiple toenails with type 2 diabetes mellitus and peripheral neuropathy 6/20/2017    Other cirrhosis of liver     1-28-19 Liver has a cirrhotic morphology with no focal lesions.  Significant interval increase in ascites when compared to prior exam which may account for patient's abdominal distension.  Hypodense air-fluid collection along the  body of the pancreas which is slightly smaller when compared to prior CT.  Findings may relate to pancreatic necrosis with pancreatic pseudocysts with infected pseudocyst    Perianal cyst     approx 2 yrs ago    Pseudocyst of pancreas 1/28/2019 1-28-19 Liver has a cirrhotic morphology with no focal lesions.  Significant interval increase in ascites when compared to prior exam which may account for patient's abdominal distension.  Hypodense air-fluid collection along the body of the pancreas which is slightly smaller when compared to prior CT.  Findings may relate to pancreatic necrosis with pancreatic pseudocysts with infected pseudocyst    Skin cancer     skin cancer    Sleep apnea 8/26/2015    Status post bariatric surgery 1/11/2016    Type 2 diabetes mellitus, with long-term current use of insulin 5/8/2015     Past Surgical History:   Procedure Laterality Date    ANGIOGRAM, CORONARY ARTERY Right 3/20/2019    Performed by Bob Duque MD at [facility] C[facility] LAB    ANGIOPLASTY      total x5 stents    Bypass graft study  3/20/2019    Performed by Bob Duque MD at [facility] CATH LAB    COLONOSCOPY N/A 10/6/2015    Performed by Shekhar Richards MD at [facility] ENDO (2ND FLR)    CORONARY ARTERY BYPASS GRAFT  2017    x3    CYST REMOVAL      GASTRECTOMY      GASTRECTOMY-SLEEVE-LAPAROSCOPIC - 76111 N/A 12/22/2015    Performed by Micheal Villavicencio Jr., MD at [facility] OR 2ND FLR    KNEE ARTHROSCOPY      perianal surgery      perianal cyst removed       Home Meds:   Prior to Admission medications    Medication Sig Start Date End Date Taking? Authorizing Provider   amiodarone (PACERONE) 200 MG Tab Take 1 tablet (200 mg total) by mouth once daily. 4/1/19 3/31/20  Lorie Higuera MD   amiodarone (PACERONE) 400 MG tablet Take 1 tablet (400 mg total) by mouth 2 (two) times daily. for 11 days 3/20/19 3/31/19  Lorie Higuera MD   apixaban (ELIQUIS) 5 mg Tab Take 1 tablet (5 mg total) by mouth 2 (two) times daily.  3/20/19   Lorie Higuera MD   atorvastatin (LIPITOR) 40 MG tablet Take 1 tablet (40 mg total) by mouth once daily. 2/4/19 2/4/20  Alfonso Mahmood MD   clopidogrel (PLAVIX) 75 mg tablet Take 1 tablet (75 mg total) by mouth once daily. 3/21/19 3/20/20  Lorie Higuera MD   cyanocobalamin, vitamin B-12, 500 mcg Subl Place 1 tablet under the tongue every Monday.     Historical Provider, MD   furosemide (LASIX) 40 MG tablet Take 1 tablet (40 mg total) by mouth once daily. 2/4/19 2/4/20  Alfonso Mahmood MD   insulin aspart U-100 (NOVOLOG) 100 unit/mL injection Inject 4 Units into the skin 3 (three) times daily before meals.    Historical Provider, MD   insulin detemir (LEVEMIR FLEXPEN SUBQ) Inject 12 Units into the skin once daily.     Historical Provider, MD   lipase-protease-amylase (CREON) 36,000-114,000- 180,000 unit CpDR Take 1 capsule by mouth 3 (three) times daily. 2/4/19   Alfonso Mahmood MD   metoprolol succinate (TOPROL-XL) 100 MG 24 hr tablet Take 1 tablet (100 mg total) by mouth once daily. 2/4/19 2/4/20  Alfonso Mahmood MD   omeprazole (PRILOSEC) 40 MG capsule Take 40 mg by mouth once daily. 2/18/19   Historical Provider, MD   ONETOUCH ULTRA TEST Strp 4 (four) times daily. Use to test blood sugar four times a day. 10/15/15   Historical Provider, MD   ramipril (ALTACE) 2.5 MG capsule Take 1 capsule (2.5 mg total) by mouth once daily. Hold until Cardiology follow up 3/20/19   Lorie Higuera MD   spironolactone (ALDACTONE) 100 MG tablet Take 1 tablet (100 mg total) by mouth once daily. 2/4/19   Alfonso Mahmood MD   tamsulosin (FLOMAX) 0.4 mg Cap Take 1 capsule by mouth once daily. Pt has not started taking as of 2/27/19. 2/5/19   Historical Provider, MD   vitamin D (VITAMIN D3) 1000 units Tab Take 1 tablet (1,000 Units total) by mouth once daily. 1/31/19   Jian Lambert MD     Anticoagulants/Antiplatelets: Eliquis    Allergies: Review of patient's allergies indicates:  No Known Allergies  Sedation History:  no adverse  "reactions    Review of Systems:   Hematological: no known coagulopathies  Respiratory: no shortness of breath  Cardiovascular: no chest pain  Gastrointestinal: no abdominal pain  Genito-Urinary: no dysuria  Musculoskeletal: negative  Neurological: no TIA or stroke symptoms         OBJECTIVE:     *Baseline BPs 90-100s.   Patient hemodynamically stable and states "he feels fine"    Vital Signs (Most Recent)  Pulse: 64 (03/27/19 1100)  BP: 90/60 (03/27/19 1100)  SpO2: 98 % (03/27/19 1100)    Physical Exam:  ASA: 2  Mallampati: na    General: no acute distress  Mental Status: alert and oriented to person, place and time  HEENT: normocephalic, atraumatic  Chest: unlabored breathing  Heart: regular heart rate  Abdomen: distended  Extremity: moves all extremities    Laboratory  Lab Results   Component Value Date    INR 1.2 03/21/2019       Lab Results   Component Value Date    WBC 4.67 03/19/2019    HGB 9.0 (L) 03/19/2019    HCT 27.8 (L) 03/19/2019    MCV 88 03/19/2019     03/19/2019      Lab Results   Component Value Date     (H) 03/21/2019     03/21/2019    K 3.8 03/21/2019     03/21/2019    CO2 26 03/21/2019    BUN 20 03/21/2019    CREATININE 1.3 03/21/2019    CALCIUM 8.1 (L) 03/21/2019    MG 1.6 03/15/2019    ALT 11 03/21/2019    AST 14 03/21/2019    ALBUMIN 2.8 (L) 03/21/2019    BILITOT 0.8 03/21/2019    BILIDIR 0.2 07/10/2015       ASSESSMENT/PLAN:     Sedation Plan: local anesthesia  Patient will undergo US guided paracentesis        "

## 2019-03-27 NOTE — PROGRESS NOTES
[facility]PAT[facility] CL[facility]IC VISIT FOLLOW UP NOTE    REFERRING PROVIDER:  No ref. provider found    CHIEF COMPLAINT: ascites, liver biopsy results    HPI: This is a 63 y.o. White male with PMH noted below, presenting for evaluation of ascites, mild liver fibrosis per recent liver biopsy    Previously seen for evaluation of possible cirrhosis, although more recent workup does not suggest such    Reports diagnosis of liver disease/ cirrhosis when hospitalized 1/2019 based on imaging characteristics.     With ascites. No h/o hepatic encephalopathy, or history of variceal bleeding     Interval HPI: Since last visit, hospitalized for fluid overload, see below. Today, presents with wife. LE edema and abdominal distension MUCH improved since hospital admission. BP low normal today but reports feeling well. Denies weakness/dizziness except for fatigue with exertion (chronic, unchanged). Pt plan to start PT soon. F/u with cardiology next week. TJ liver biopsy done 3/15/19, showed SH, Stage 1 fibrosis, no cirrhosis. However, evaluation of fibrosis is limited by the sampling of 3 large hilar structures. The portal tracts away from these large hilar structures do not show advanced fibrosis. Corrected sinusoidal pressure (wedged hepatic vein pressure minus free hepatic vein pressure) is 2 mmHg during TJ biopsy.        Was hospitalized 1/28/19 - 1/30/19:   -- presented left sided upper abd pain, significant ascites and worsening BLE edema. Para performed, diuretics adjusted  -- CT abdo revealed cirrhosis, anasarca and increased volume ascites.  TTE reveal HFrEF EF 30% with diastolic dysfunction    Also hospitalized 3/12/19 - 3/21/19  -- admitted for shortness of breath due to ascites. Patient reports worsening shortness of breath for the last 4 weeks with associated LE edema and belly distension. He reports having a therapeutic paracentesis 4 weeks ago which removed 5L of fluid.  His body swelling and SOB improved temporarily then  started to worsen again.   --  Transjugular liver biopsy was performed on 3/15, path pending.  After his procedure, he had an episode of chest pain with new afib with RVR.  He spontaneously converted. Troponin peaked at 3.  Cardiology was consulted and he was started on ACS protocol.  They believe that his troponin was elevated 2/2 demand ischemia from his afib.  He had a cath on 3/20 which was negative.   -- per Hepatology notes 3/14/19: Ascites analysis not c/w fluid from cirrhosis. Recommend TJ liver biopsy with portal pressure measurements to determine if has cirrhosis. Repeat paracentesis.        Patient reports the following alcohol history: Last drink 9/2018   Social History     Substance and Sexual Activity   Alcohol Use No    Comment: started ~2014, reports 1 shot daily, max 3 shots daily, vague about alcohol consumption. Last drink 9/2018        CT was done 3/16/19 showed liver with lobulated contour, splenomegaly, ascites.     Of note: for pancreatic pseudocyst, followed by Dr. Hall 12/8/18. Had f/u with Dr. Hall scheduled for 3/14/19 but missed due to hospitalization, message sent to reschedule     Lab Results   Component Value Date    ALT 11 03/21/2019    AST 14 03/21/2019    ALKPHOS 75 03/21/2019    BILITOT 0.8 03/21/2019    ALBUMIN 2.8 (L) 03/21/2019    INR 1.2 03/21/2019     03/19/2019     MELD-Na score: 11 at 3/21/2019  4:19 AM  MELD score: 11 at 3/21/2019  4:19 AM  Calculated from:  Serum Creatinine: 1.3 mg/dL at 3/21/2019  4:19 AM  Serum Sodium: 137 mmol/L at 3/21/2019  4:19 AM  Total Bilirubin: 0.8 mg/dL (Rounded to 1 mg/dL) at 3/21/2019  4:19 AM  INR(ratio): 1.2 at 3/21/2019  4:19 AM  Age: 63 years     Serological workup was negative for Anjel's, alpha-1 antitrypsin deficiency, hemochromatosis, autoimmune etiology, and viral hepatitis.      Symptoms of decompensation:   1. Ascites : requiring 1st para while hospitalized 1/29/19, most recent para 3/15/19, has repeat para today    Initially (1/29/19), SAAG = 0.8 = NOT c/w portal HTN/cirrhosis etiology. Protein level 1.9  However, most recent para 3/15/19 SAAG 2.8 (however, receiving albumin infusions while hospitalized so unsure if affects). Protein 1.7  Etiology multifactorial (?), although biopsy path does not suggest advanced fibrosis and portal pressures do not suggest portal HTN    No h/o HE or variceal bleeding      Cirrhosis Health Maintenance:   -- Last EGD ?? Pt unsure  -- HCC screening              CT - 3/2019 no lesions              AFP - WNL 2/2019    -- Immunity to Hep A and B - needs Hep B vaccine (sent to [facility]), + Hep A immunity     Risk factors for fatty liver include possible heavy alcohol use in the past (?? Vague intake of alcohol).  Risk factors for NAFLD include morbid obesity, HTN, HLD, T2DM.      Also + risk for congestive hepatopathy given CHF     Denies jaundice, dark urine,  hematemesis, melena, slowed mentation. No abnormal skin rashes. No generalized joint or muscle pain. + mild abdominal distension (difficult to determine severity given morbid obesity)       Review of patient's allergies indicates:  No Known Allergies    Current Outpatient Medications on File Prior to Visit   Medication Sig Dispense Refill    [START ON 4/1/2019] amiodarone (PACERONE) 200 MG Tab Take 1 tablet (200 mg total) by mouth once daily. 30 tablet 11    amiodarone (PACERONE) 400 MG tablet Take 1 tablet (400 mg total) by mouth 2 (two) times daily. for 11 days 22 tablet 0    apixaban (ELIQUIS) 5 mg Tab Take 1 tablet (5 mg total) by mouth 2 (two) times daily. 90 tablet 3    atorvastatin (LIPITOR) 40 MG tablet Take 1 tablet (40 mg total) by mouth once daily. 90 tablet 3    clopidogrel (PLAVIX) 75 mg tablet Take 1 tablet (75 mg total) by mouth once daily. 30 tablet 11    cyanocobalamin, vitamin B-12, 500 mcg Subl Place 1 tablet under the tongue every Monday.       furosemide (LASIX) 40 MG tablet Take 1 tablet (40 mg total) by  mouth once daily. 90 tablet 3    insulin aspart U-100 (NOVOLOG) 100 unit/mL injection Inject 4 Units into the skin 3 (three) times daily before meals.      insulin detemir (LEVEMIR FLEXPEN SUBQ) Inject 12 Units into the skin once daily.       lipase-protease-amylase (CREON) 36,000-114,000- 180,000 unit CpDR Take 1 capsule by mouth 3 (three) times daily. 270 capsule 3    metoprolol succinate (TOPROL-XL) 100 MG 24 hr tablet Take 1 tablet (100 mg total) by mouth once daily. 90 tablet 3    omeprazole (PRILOSEC) 40 MG capsule Take 40 mg by mouth once daily.  8    ONETOUCH ULTRA TEST Strp 4 (four) times daily. Use to test blood sugar four times a day.  3    ramipril (ALTACE) 2.5 MG capsule Take 1 capsule (2.5 mg total) by mouth once daily. Hold until Cardiology follow up      spironolactone (ALDACTONE) 100 MG tablet Take 1 tablet (100 mg total) by mouth once daily. 90 tablet 3    tamsulosin (FLOMAX) 0.4 mg Cap Take 1 capsule by mouth once daily. Pt has not started taking as of 2/27/19.  0    vitamin D (VITAMIN D3) 1000 units Tab Take 1 tablet (1,000 Units total) by mouth once daily.       No current facility-administered medications on file prior to visit.        PMHX:  has a past medical history of Alcohol abuse, Anasarca (1/28/2019), Arthropathy associated with neurological disorder (9/2/2015), Atherosclerosis, Charcot foot due to diabetes mellitus, Chronic combined systolic and diastolic heart failure (01/29/2019), Chronic pancreatitis (1/28/2019), Cirrhosis of liver with ascites (1/28/2019), Colon polyps, Coronary artery disease due to calcified coronary lesion (05/08/2015), Diabetic polyneuropathy associated with type 2 diabetes mellitus (9/2/2015), Diverticulosis (1/28/2019), DM type 2 with diabetic peripheral neuropathy (2/4/2019), Essential hypertension (1/28/2019), Former smoker (8/26/2015), Healed ulcer of left foot on examination (6/20/2017), Hydrocele, Hypoalbuminemia (2/4/2019), Lymphedema of both  lower extremities (2019), Mixed hyperlipidemia (2015), Morbid obesity with BMI of 50.0-59.9, adult (2015), Onychomycosis of multiple toenails with type 2 diabetes mellitus and peripheral neuropathy (2017), Other cirrhosis of liver, Perianal cyst, Pseudocyst of pancreas (2019), Skin cancer, Sleep apnea (2015), Status post bariatric surgery (2016), and Type 2 diabetes mellitus, with long-term current use of insulin (2015).    PSHX:  has a past surgical history that includes Angioplasty; Cyst Removal; perianal surgery; Colonoscopy (N/A, 10/6/2015); Knee arthroscopy; Gastrectomy; Coronary artery bypass graft (); Coronary angiography (Right, 3/20/2019); and Coronary bypass graft angiography (3/20/2019).    FAMILY HISTORY: Negative for liver disease, reviewed in EPIC    SOCIAL HISTORY:   Social History     Tobacco Use   Smoking Status Former Smoker    Packs/day: 2.00    Years: 30.00    Pack years: 60.00    Types: Cigarettes    Last attempt to quit: 2005    Years since quittin.1   Smokeless Tobacco Never Used       Social History     Substance and Sexual Activity   Alcohol Use No    Comment: started ~, reports 1 shot daily, max 3 shots daily, vague about alcohol consumption. Last drink 2018       Social History     Substance and Sexual Activity   Drug Use No       ROS:   GENERAL: Denies fever, chills, + weight loss (intentional, with improvement in fluid overload), + fatigue  HEENT: Denies headaches, dizziness, vision/hearing changes  CARDIOVASCULAR: Denies chest pain, palpitations, + much improved BLE edema ~+1-2 bilaterally  RESPIRATORY: + CHURCH, denies cough  GI: Denies abdominal pain, rectal bleeding, nausea, vomiting. No change in bowel pattern or color  : Denies dysuria, hematuria   SKIN: Denies rash, itching   NEURO: Denies confusion, memory loss, or mood changes  PSYCH: Denies depression or anxiety  HEME/LYMPH: + easy bruising, denies  "bleeding      PHYSICAL EXAM:   Friendly White male, Chronically ill-appearing. In wheelchair  in no acute distress; alert and oriented to person, place and time  VITALS: BP 90/62   Pulse 65   Ht 5' 7" (1.702 m)   Wt 124.7 kg (275 lb)   SpO2 98%   BMI 43.07 kg/m²   HENT: Normocephalic, without obvious abnormality. Oral mucosa pink and moist. Dentition good.  EYES: Sclerae anicteric. No conjunctival pallor.   NECK: Supple. No masses or cervical adenopathy.  CARDIOVASCULAR: Regular rate  RESPIRATORY: Normal respiratory effort. BBS CTA. No wheezes or crackles.  GI: Soft, non-tender, + mildly distended (difficult to determine if ascites present due to protuberant abdomen, morbid obesity). YONIS hepatosplenomegaly due to protuberant abdomen. No masses palpable.  EXTREMITIES:  No clubbing, cyanosis, + 1-2 BLE edema (significant improvement)  SKIN: Warm and dry. No jaundice. No rashes noted to exposed skin. No telangectasias noted. No palmar erythema.  NEURO:  YONIS gait (in wheelchair). No asterixis.  PSYCH:  Memory intact. Thought and speech pattern appropriate. Behavior normal. No depression or anxiety noted.      RECENT LABS:    Hepatitis A and B immunity markers:    Hepatitis A Antibody IgG   Date Value Ref Range Status   02/07/2019 Positive (A)  Final       Hepatitis B Surface Ag   Date Value Ref Range Status   01/28/2019 Negative  Final     Hep B Core Total Ab   Date Value Ref Range Status   02/07/2019 Negative  Final     Hep B S Ab   Date Value Ref Range Status   02/07/2019 Negative  Final     Immunization History   Administered Date(s) Administered    Influenza 10/31/2011, 12/03/2014    Influenza A (H1N1) 2009 Monovalent - IM 11/18/2009    Tdap 03/13/2017       Labs:  Lab Results   Component Value Date    WBC 4.67 03/19/2019    HGB 9.0 (L) 03/19/2019    HCT 27.8 (L) 03/19/2019     03/19/2019    CHOL 85 (L) 01/28/2019    TRIG 56 01/28/2019    HDL 31 (L) 01/28/2019     03/21/2019    K 3.8 03/21/2019 "    CREATININE 1.3 03/21/2019    ALT 11 03/21/2019    AST 14 03/21/2019    ALKPHOS 75 03/21/2019    BILITOT 0.8 03/21/2019    ALBUMIN 2.8 (L) 03/21/2019    INR 1.2 03/21/2019    AFP 1.5 02/07/2019       DIAGNOSTIC STUDIES:  EGD-   None - no indication, no cirrhosis or significant pH on pressures  COLONOSCOPY-   Due for repeat 10/2020  ABD. U/S-  CT SCAN-   Done 1/28/19       FINDINGS:  Heart is not enlarged.  There is extensive coronary atherosclerosis.  Visualized portions of the lung bases demonstrate no acute abnormalities.    There is a cirrhotic morphology to the liver with a nodular contour.  No focal liver lesions are noted on single phase of imaging.  No significant intrahepatic biliary ductal dilatation.  Gallbladder is mildly distended but shows no evidence of radiopaque Stones or wall thickening.  There are postoperative changes of sleeve gastrectomy.  There is a 9.3 x 3.9 x 3.4 cm hypodense air-fluid collection centered around the body of the pancreas.  The portal vein is narrowed near the confluence of the splenic vein and SMV although these vessels appear patent.    Spleen is upper normal in size.    Small and large bowel show no evidence of obstruction.  There is no free air.  Appendix not definitely visualized.  Tubular air-filled structure in the right lower abdomen is possibly the appendix and appears within normal limits.  There is diverticulosis.    Kidneys have a lobular contour with vascular calcifications.  No evidence of hydronephrosis.  Kidneys concentrate excrete contrast on delayed images.  Urinary bladder is nondistended but grossly unremarkable.    Significant interval increase in ascites when compared to prior exam.  Ascites measures slightly higher in attenuation than simple fluid although this may be in part due to artifact from patient's thick body wall and anasarca.    Aorta is normal in caliber with severe atherosclerosis.  There is diffuse anasarca.  Bones demonstrate no acute  abnormalities.       Impression         Liver has a cirrhotic morphology with no focal lesions.  Significant interval increase in ascites when compared to prior exam which may account for patient's abdominal distension.    Hypodense air-fluid collection along the body of the pancreas which is slightly smaller when compared to prior CT.  Findings may relate to pancreatic necrosis with pancreatic pseudocysts with infected pseudocyst not excluded.  Mass effect with narrowing of the portal confluence in the region although the vessels appear patent.    Diffuse anasarca.    Sleeve gastrectomy.    Extensive atherosclerosis.         MRI-  LIVER BIOPSY-  FIBROSCAN -      ASSESSMENT:  63 y.o. White male with:  1.  Ascites   -- no cirrhosis on biopsy, no clinically significant portal hypertension during TJ biopsy portal pressure measurement  -- Immunity to Hep A and B - needs Hep B vaccine (sent to [facility]), + Hep A immunity  -- Serological workup was negative for Anjel's, alpha-1 antitrypsin deficiency, hemochromatosis, autoimmune etiology, and viral hepatitis.     2. MARIE  -- unsure of intake of alcohol and duration, as patient is vague and previous hospital notes note patient underestimating alcohol intake and wife reported heavy alcohol intake, so unsure (?) if contributed  -- all risk factors for NAFLD: T2DM, morbid obesity, HTN, HLD     3. Hypotension  -- message sent to cardiology Dr. Carney to notify of BP in office.  -- Pt denies weakness, dizziness (aside from chronic fatigue with exertion, unchanged)    4. Chronic systolic and diastolic HF  -- appt with cardiology next week     5. Ascites  -- requiring 1st para while hospitalized 1/29/19, most recent para 3/15/19, has repeat para today   Initially (1/29/19), SAAG = 0.8 = NOT c/w portal HTN/cirrhosis etiology. Protein level 1.9  However, most recent para 3/15/19 SAAG 2.8 (however, receiving albumin infusions while hospitalized so unsure if affects). Protein  1.7  Etiology multifactorial (?), although biopsy path does not suggest advanced fibrosis and portal pressures do not suggest portal HTN     6. BLE edema  -- managed per cardiology, MUCH improved   -- diuretic dosing: Lasix 40 mg daily, Spironolactone 100 mg daily   -- pt reports chronic lymphedema, pt with appt with cardiology next week      6. Pancreatic pseudocyst  -- message sent to Dr. Hall to reschedule appt previously scheduled for 3/14 (missed while hospitalized)     7. T2DM, HTN, HLD, morbid obesity  -- Body mass index is 43.07 kg/m².   -- increases risk for NAFLD        PLAN:  1. Labs today   2. For now, defer diuretic dosing to cardiology. Will follow along, as BLE and ascites does not appear to be due to portal HTN  3. Paracentesis today, will arrange weekly for now   4. Recommend Hep B vaccine   5. Message sent to cardiology to notify of hypotension, possible need for medication titration. ADDENDUM: Also discussed ELIEZER on labs, as cardiology managing diuretic therapy, will defer repeat labs, ELIEZER management to cardiology   6. Message sent to Dr. Hall to arrange f/u in clinic for pancreatic pseudocyst  7. Follow up in about 1 month (around 4/24/2019). - f/u with Dr. Christensen appt in 4-6 weeks due to medical complexity, possible non-liver related etiology of ascites, will determine need for chronic f/u at that time, labs per cardiology given diuretic titration      Thank you for allowing me to participate in the care of Jian Almonte, NP-C    CC'ed note to:   Dr. SON Mahmood MD

## 2019-03-27 NOTE — PATIENT INSTRUCTIONS
Counseling  - NO alcohol use (includes beer, wine, and/or liquor)  - low sodium (salt) 2 gram per day diet  - high protein diet: 130 grams per day to prevent muscle mass loss. Drink at least 1 protein shake daily (Premier Protein is best option because it is very high protein and low sugar). Ok to use this as nighttime snack to fit it in   - resistance exercises for muscle strength  - avoid raw seafoods due to the risk of fatal Vibrio vulnificus infection

## 2019-03-27 NOTE — Clinical Note
Jose, We saw Mr. Arrieta in [facility]. I'm puzzled by him, was going to arrange chronic f/u with you if that's ok but wondering if you could take a look at him in the meantime. Initially saw for eval for cirrhosis, has significant CHF, and suspected possible congestive hepatopathy so had plan for TJ biopsy. TJ liver biopsy done 3/15/19, showed SH, Stage 1 fibrosis, no cirrhosis. However, evaluation of fibrosis is limited by the sampling of 3 large hilar structures. The portal tracts away from these large hilar structures do not show advanced fibrosis. Corrected sinusoidal pressure (wedged hepatic vein pressure minus free hepatic vein pressure) is 2 mmHg during TJ biopsy. However, SAAG >1.1 and protein on ascitic fluid 1.7, so wasn't sure what you thought of this (given continued significant fluid overload and ascites requiring rohit now)Of note, ELIEZER on labs today, diuretics managed by cardiology, so fluid overload will likely get worse with decrease in diureticsThanks!

## 2019-03-27 NOTE — DISCHARGE INSTRUCTIONS
"For scheduling: Call Isabelle at [phone]    For questions or concerns call: KESHAV MON-FRI 8 AM- 5PM: [phone].   **After hours and weekends: Call [phone] and ask for "Radiology Resident on call".    For immediate concerns that are not emergent, you may call our radiology clinic at: [phone]    "

## 2019-03-28 ENCOUNTER — TELEPHONE (OUTPATIENT)
Dept: CARDIOLOGY | Facility: CLINIC | Age: 64
End: 2019-03-28

## 2019-03-28 ENCOUNTER — TELEPHONE (OUTPATIENT)
Dept: HEPATOLOGY | Facility: CLINIC | Age: 64
End: 2019-03-28

## 2019-03-28 NOTE — TELEPHONE ENCOUNTER
----- Message from Luisito Joiner MD sent at 3/28/2019  9:28 AM CDT -----  Do you mind calling and setting up a BMP on Monday? I think [facility].    Thanks!    Luisito

## 2019-03-28 NOTE — TELEPHONE ENCOUNTER
----- Message from Aleshia Almonte NP sent at 3/27/2019 12:49 PM CDT -----  Regarding: hypotension in clinic   Hi Dr. Carney,    I saw Mr. Jian Arrieta in the hepatology clinic this AM. Just wanted to mention that his BP was on lower end of normal (90's/40s on repeat), initially 80's/40's. He denied any weakness, dizziness (aside from chronic fatigue with exertion, unchanged). I wanted to let you know in case you think he may need any medication adjustments. I was not sure if they would be able to complete his paracentesis today because of his BP but the IR team did report they would reassess and try. Just wanted to let you know in case you wish to make any medication adjustments    Thanks  ANDREW Villa, FNP-C  Hepatology and Liver Transplant Nurse Practitioner  [facility]  [facility]

## 2019-03-28 NOTE — TELEPHONE ENCOUNTER
Please contact patient, schedule f/u with Dr. Christensen for f/u in 4-6 weeks (will continue follow up with Dr. Christensen if needs long term)    Also, please schedule paracentesis in 1 week for continued ascites drainage (to send WBC & Diff with para), orders in. Or can provide pt with IR scheduling phone number if wishes to schedule himself weekly    Thanks

## 2019-03-28 NOTE — TELEPHONE ENCOUNTER
Called Mr. Arrieta, both cell and home numbers, left a message on home number to call us back at his convenience.  He was hypotensive and noted to have acute kidney injury on lab results today, would like to hold Aldactone, and follow up with BMP first of next week - Monday or Tuesday.    Luisito Joiner MD

## 2019-03-29 ENCOUNTER — TELEPHONE (OUTPATIENT)
Dept: INTERNAL MEDICINE | Facility: CLINIC | Age: 64
End: 2019-03-29

## 2019-03-29 NOTE — TELEPHONE ENCOUNTER
----- Message from Tamiko Samayoa sent at 3/29/2019 12:30 PM CDT -----  Contact: [facility]/[phone]  [facility] is requesting a call from Dr. Samir Mahmood regarding the patient new skin tear on left leg.    Please advise, thank you

## 2019-03-30 LAB — BACTERIA SPEC AEROBE CULT: NO GROWTH

## 2019-04-01 ENCOUNTER — LAB VISIT (OUTPATIENT)
Dept: LAB | Facility: HOSPITAL | Age: 64
End: 2019-04-01
Attending: INTERNAL MEDICINE
Payer: COMMERCIAL

## 2019-04-01 ENCOUNTER — TELEPHONE (OUTPATIENT)
Dept: INTERNAL MEDICINE | Facility: CLINIC | Age: 64
End: 2019-04-01

## 2019-04-01 DIAGNOSIS — N17.9 AKI (ACUTE KIDNEY INJURY): ICD-10-CM

## 2019-04-01 LAB
ANION GAP SERPL CALC-SCNC: 7 MMOL/L (ref 8–16)
BUN SERPL-MCNC: 45 MG/DL (ref 8–23)
CALCIUM SERPL-MCNC: 8.1 MG/DL (ref 8.7–10.5)
CHLORIDE SERPL-SCNC: 104 MMOL/L (ref 95–110)
CO2 SERPL-SCNC: 20 MMOL/L (ref 23–29)
CREAT SERPL-MCNC: 2 MG/DL (ref 0.5–1.4)
EST. GFR  (AFRICAN AMERICAN): 39.6 ML/MIN/1.73 M^2
EST. GFR  (NON AFRICAN AMERICAN): 34.3 ML/MIN/1.73 M^2
GLUCOSE SERPL-MCNC: 168 MG/DL (ref 70–110)
POTASSIUM SERPL-SCNC: 5.2 MMOL/L (ref 3.5–5.1)
SODIUM SERPL-SCNC: 131 MMOL/L (ref 136–145)

## 2019-04-01 PROCEDURE — 80048 BASIC METABOLIC PNL TOTAL CA: CPT

## 2019-04-01 PROCEDURE — 36415 COLL VENOUS BLD VENIPUNCTURE: CPT | Mod: PO

## 2019-04-01 NOTE — TELEPHONE ENCOUNTER
----- Message from Fabiana Benson sent at 4/1/2019  2:33 PM CDT -----  Contact: [facility]/[place]/[phone]  [facility] called in regards to getting wound care orders due to the pt has a skin tear to his left leg. she would like to get a verbal.      Please advise

## 2019-04-02 ENCOUNTER — TELEPHONE (OUTPATIENT)
Dept: CARDIOLOGY | Facility: CLINIC | Age: 64
End: 2019-04-02

## 2019-04-02 DIAGNOSIS — N17.9 AKI (ACUTE KIDNEY INJURY): Primary | ICD-10-CM

## 2019-04-02 NOTE — TELEPHONE ENCOUNTER
This pt was only seen by Alfonso Mahmood for a hospital follow up. Pt had to follow up with PCP Samir Mahmood

## 2019-04-02 NOTE — TELEPHONE ENCOUNTER
----- Message from Luisito Joiner MD sent at 4/1/2019  6:54 PM CDT -----  This is your ha, you saw the notes were his renal function ticked up last week,     Looks like better this week but now with hyperkalemia, just wanted to make sure you saw.    Luisito

## 2019-04-02 NOTE — TELEPHONE ENCOUNTER
Dr gómez obrien has not seen this , patient was last seen In 2016 , Dr leeanne Obrien is not taken new patients

## 2019-04-02 NOTE — TELEPHONE ENCOUNTER
Labs checked yesterday. Cr slightly better from 2.2 to 2.0; however, K up to >5. He had stopped aldactone prior to this lab. He will f/u with me in clinic on 4/4 for further management. Repeat CMP that day.

## 2019-04-02 NOTE — TELEPHONE ENCOUNTER
----- Message from Jaime Carney III, MD sent at 4/2/2019  3:22 PM CDT -----  Hello,  Can you please set Mr Arrieta up to have a CMP drawn this Thursday prior to my appointment with him that afternoon?    Thanks!  Samuel

## 2019-04-03 LAB — BACTERIA SPEC ANAEROBE CULT: NORMAL

## 2019-04-03 NOTE — PROGRESS NOTES
Cardiology Clinic Note  Reason for Visit: cardiomyopathy    HPI:     Jian Arrieta is a 63 y.o. M with CAD s/p PCI, HLD, DM2, cirrhosis, BERHANE, who presents for follow up after recent hospitalization for ascites.    Since our last visit, he presented to [facility] 3/12-3/21 with shortness of breath x4 weeks due to progressive ascites. In the ED, labs showed hyponatremia (13), ELIEZER (Cr 1.6), and negative troponins x2. CTA was negative other than abdominal ascites. An abdominal US revealed portal hypotension, large volume ascites, and splenomegaly. He had a 10L paracentesis on 3/13 and 6L para on 3/15. Transjugular biopsy was performed. Following the procedure, he had chest pain with afib/RVR, which spontaneously converted. Troponin trended up to 3. Cardiology was consulted and believed this was secondary to demand ischemia from afib. He was started on amiodarone for rhythm control. C on 3/20 revealed patent LIMA-LAD, SVG-OM, and  of RCA with L->R collaterals. No intervention was performed. He was discharged on eliquis and plavix.    Regarding his diuretics, he was continued on spironolactone and lasix during his hospitalization. Upon following up with hepatology following hospital stay, labs were obtained and revealed K 4.5, Cr 2.2. He was hypotensive but asymptomatic. Diuretics were deferred to cardiology. Spironolactone was held, and lasix was continued. On repeat labs, Cr 2.0, K 5.3. He presents today for further evaluation.    He reports that his breathing is getting increasingly difficult, as his abdomen becomes more tense and distended. His legs are swollen as well. The only intervention that has improved his dyspnea is the prior paracenteses. No chest discomfort. No palpitations or symptoms of arrhythmia.     Medical: CAD s/p PCI (x5) with subsequent CABG x3v (~2017 at ; LIMA-LAD [patent], SVG-OM [patent], SVG-RCA[occluded; L->R collaterals from LAD to PDA]), HLD, DM2, BERHANE on CPAP, R knee OA, Charcot foot,  cirrhosis, normocytic anemia  Surgical: sleeve gastrectomy, knee, perianal cyst removal, CABG   Family: DM, Parkinson's, cancers, stroke  Social: former smoker of 2 PPD x30y (quit 2005)     To note, he was previously followed by Dr Arroyo at  cardiology. He had a total of five stents placed over the last 20y. He had CABG 1.5-2y ago. He was having chest pain provoked by stressful situations prior to interventions.     ROS:    Constitution: Negative for fever or chills.  HENT: Negative for  headaches.  Eyes: Negative for blurred vision.   Cardiovascular: See above  Pulmonary: Positive for SOB. Negative for cough.   Gastrointestinal: Negative for nausea/vomiting.   : Negative for dysuria.   Skin: Negative for rashes.  Neurological: Negative for focal weakness.  Psychological: Negative for depression.  PMH:     Past Medical History:   Diagnosis Date    Alcohol abuse     Anasarca 1/28/2019    Arthropathy associated with neurological disorder 9/2/2015    Atherosclerosis     Charcot foot due to diabetes mellitus     Chronic combined systolic and diastolic heart failure 01/29/2019 1-28-19 Left VentricleModerate decreased ejection fraction at 30%. Normal left ventricular cavity size. Normal wall thickness observed. Grade I (mild) left ventricular diastolic dysfunction consistent with impaired relaxation. Normal left atrial pressure. Moderate, global hypokinesis(see wall scoring diagram). Right VentricleNormal cavity size, wall thickness and ejection fraction. Wall motion n    Chronic pancreatitis 1/28/2019    Colon polyps     approx 5 yrs ago    Coronary artery disease due to calcified coronary lesion 05/08/2015    5 stents on ASA      Diabetic polyneuropathy associated with type 2 diabetes mellitus 9/2/2015    Diverticulosis 1/28/2019    DM type 2 with diabetic peripheral neuropathy 2/4/2019    Essential hypertension 1/28/2019    Former smoker 8/26/2015    Healed ulcer of left foot on examination  6/20/2017    Hydrocele     approx 1.5 yrs ago    Hypoalbuminemia 2/4/2019    Lymphedema of both lower extremities 1/29/2019    Mixed hyperlipidemia 5/8/2015    Morbid obesity with BMI of 50.0-59.9, adult 5/8/2015    Onychomycosis of multiple toenails with type 2 diabetes mellitus and peripheral neuropathy 6/20/2017    Perianal cyst     approx 2 yrs ago    Pseudocyst of pancreas 1/28/2019 1-28-19 Liver has a cirrhotic morphology with no focal lesions.  Significant interval increase in ascites when compared to prior exam which may account for patient's abdominal distension.  Hypodense air-fluid collection along the body of the pancreas which is slightly smaller when compared to prior CT.  Findings may relate to pancreatic necrosis with pancreatic pseudocysts with infected pseudocyst    Skin cancer     skin cancer    Sleep apnea 8/26/2015    Status post bariatric surgery 1/11/2016    Type 2 diabetes mellitus, with long-term current use of insulin 5/8/2015     Past Surgical History:   Procedure Laterality Date    ANGIOGRAM, CORONARY ARTERY Right 3/20/2019    Performed by [facility] at [facility] CATH LAB    ANGIOPLASTY      total x5 stents    Bypass graft study  3/20/2019    Performed by [facility] at [facility] CATH LAB    COLONOSCOPY N/A 10/6/2015    Performed by Shekhar Richards MD at [facility] ENDO (2ND FLR)    CORONARY ARTERY BYPASS GRAFT  2017    x3    CYST REMOVAL      GASTRECTOMY      GASTRECTOMY-SLEEVE-LAPAROSCOPIC - 74061 N/A 12/22/2015    Performed by Micheal Villavicencio Jr., MD at [facility] OR 2ND FLR    KNEE ARTHROSCOPY      perianal surgery      perianal cyst removed     Allergies:   Review of patient's allergies indicates:  No Known Allergies  Medications:     Current Outpatient Medications on File Prior to Visit   Medication Sig Dispense Refill    amiodarone (PACERONE) 200 MG Tab Take 1 tablet (200 mg total) by mouth once daily. 30 tablet 11    apixaban (ELIQUIS) 5 mg Tab Take 1  tablet (5 mg total) by mouth 2 (two) times daily. 90 tablet 3    atorvastatin (LIPITOR) 40 MG tablet Take 1 tablet (40 mg total) by mouth once daily. 90 tablet 3    clopidogrel (PLAVIX) 75 mg tablet Take 1 tablet (75 mg total) by mouth once daily. 30 tablet 11    cyanocobalamin, vitamin B-12, 500 mcg Subl Place 1 tablet under the tongue every Monday.       furosemide (LASIX) 40 MG tablet Take 1 tablet (40 mg total) by mouth once daily. 90 tablet 3    insulin aspart U-100 (NOVOLOG) 100 unit/mL injection Inject 4 Units into the skin 3 (three) times daily before meals.      insulin detemir (LEVEMIR FLEXPEN SUBQ) Inject 12 Units into the skin once daily.       lipase-protease-amylase (CREON) 36,000-114,000- 180,000 unit CpDR Take 1 capsule by mouth 3 (three) times daily. 270 capsule 3    metoprolol succinate (TOPROL-XL) 100 MG 24 hr tablet Take 1 tablet (100 mg total) by mouth once daily. 90 tablet 3    omeprazole (PRILOSEC) 40 MG capsule Take 40 mg by mouth once daily.  8    ONETOUCH ULTRA TEST Strp 4 (four) times daily. Use to test blood sugar four times a day.  3    ramipril (ALTACE) 2.5 MG capsule Take 1 capsule (2.5 mg total) by mouth once daily. Hold until Cardiology follow up      tamsulosin (FLOMAX) 0.4 mg Cap Take 1 capsule by mouth once daily. Pt has not started taking as of 19.  0    vitamin D (VITAMIN D3) 1000 units Tab Take 1 tablet (1,000 Units total) by mouth once daily.       No current facility-administered medications on file prior to visit.      Social History:     Social History     Tobacco Use    Smoking status: Former Smoker     Packs/day: 2.00     Years: 30.00     Pack years: 60.00     Types: Cigarettes     Last attempt to quit: 2005     Years since quittin.1    Smokeless tobacco: Never Used   Substance Use Topics    Alcohol use: No     Comment: started ~, reports 1 shot daily, max 3 shots daily, vague about alcohol consumption. Last drink 2018     Family  "History:     Family History   Problem Relation Age of Onset    Cancer Mother     Cancer Father     Heart disease Father     Obesity Sister     Parkinsonism Brother     No Known Problems Paternal Grandmother     Cancer Paternal Grandfather     Cancer Brother     Diabetes Maternal Grandmother     Stroke Maternal Grandfather     Cirrhosis Neg Hx      Physical Exam:   BP (!) 97/56   Pulse 70   Ht 5' 7" (1.702 m)   Wt 129.5 kg (285 lb 6.2 oz)   BMI 44.70 kg/m²      Constitutional: No apparent distress, conversant  HEENT: Sclera anicteric, extraocular movements intact  Neck: No jugular venous distension, no carotid bruits  CV: Regular rate and rhythm, distant heart sounds due to body habitus  Pulm: Clear to auscultation bilaterally  GI: Abdomen tense, distended, fluid wave present  Extremities: 1+ bilateral nonpitting lower extremity edema, warm with palpable pulses  Skin: Multiple areas of bruising on extremities  Psych: Alert and oriented to person place location, appropriate affect  Neuro: No focal deficits    Labs:     Blood Tests:  Lab Results   Component Value Date    BNP 89 03/12/2019     (L) 04/01/2019    K 5.2 (H) 04/01/2019     04/01/2019    CO2 20 (L) 04/01/2019    BUN 45 (H) 04/01/2019    CREATININE 2.0 (H) 04/01/2019     (H) 04/01/2019    HGBA1C 7.4 (H) 03/18/2019    MG 1.6 03/15/2019    AST 15 03/27/2019    ALT 13 03/27/2019    ALBUMIN 3.4 (L) 03/27/2019    PROT 5.8 (L) 03/27/2019    BILITOT 0.9 03/27/2019    WBC 4.67 03/19/2019    HGB 9.0 (L) 03/19/2019    HCT 27.8 (L) 03/19/2019    HCT 32 (L) 01/28/2019    MCV 88 03/19/2019     03/19/2019    INR 1.3 (H) 03/27/2019    TSH 2.211 01/28/2019       Lab Results   Component Value Date    CHOL 85 (L) 01/28/2019    HDL 31 (L) 01/28/2019    TRIG 56 01/28/2019       Lab Results   Component Value Date    LDLCALC 42.8 (L) 01/28/2019       Urine Tests:  Lab Results   Component Value Date    COLORU Yellow 03/13/2019    " APPEARANCEUA Clear 03/13/2019    PHUR 5.0 03/13/2019    SPECGRAV >=1.030 (A) 03/13/2019    PROTEINUA Negative 03/13/2019    GLUCUA Negative 03/13/2019    KETONESU Negative 03/13/2019    BILIRUBINUA Negative 03/13/2019    OCCULTUA Negative 03/13/2019    NITRITE Negative 03/13/2019    LEUKOCYTESUR Negative 03/13/2019       Imaging:     Echocardiogram  TTE 3/18/19  · Technically difficult study  · Mildly decreased left ventricular systolic function. The estimated ejection fraction is roughly 40% (difficult to assess even with echo contrast).  · Normal right ventricular systolic function.  · Left ventricular diastolic dysfunction.  · Normal central venous pressure (3 mm Hg).    TTE 1/28/19  · Technically difficult study - BMI > 50  · Moderately decreased left ventricular systolic function. The estimated ejection fraction is 30%  · Normal right ventricular systolic function.  · Grade I (mild) left ventricular diastolic dysfunction consistent with impaired relaxation.    Stress testing  None    Cath Lab  [facility] 3/20/19  · Prox LAD lesion , 95% stenosed.  · Prox Cx to Mid Cx lesion , 75% stenosed.  · Mid Cx to Dist Cx lesion , 90% stenosed.  · Prox RCA  with left to right collaterals from LAD to PDA  · Patent LIMA to LAD  · Patent SVG to OM2  ·  of SVG to RCA  · Estimated blood loss: none    Left Main   There is mild diffuse disease throughout the vessel.   Left Anterior Descending   Prox LAD lesion is 95% stenosed.   First Diagonal Branch   There is moderate diffuse disease throughout the vessel.   Left Circumflex   Prox Cx to Mid Cx lesion is 75% stenosed. The lesion was previously treated using a stent of unknown type.   Mid Cx to Dist Cx lesion is 90% stenosed. The lesion was previously treated using a stent of unknown type.   First Obtuse Marginal Branch   There is moderate diffuse disease throughout the vessel.   Right Coronary Artery   Prox RCA to Mid RCA lesion is 100% stenosed.   Inferior Septal   Collaterals  "  Inf Sept filled by collaterals from 2nd Sept.      Graft to 2nd Mrg   LIMA Graft to Mid LAD     Intervention   No interventions have been documented.    Other  None    EK19  Sinus tachycardia  Otherwise normal ECG    Assessment:     1. Paroxysmal atrial fibrillation    2. Mixed hyperlipidemia    3. Essential hypertension    4. Coronary artery disease due to calcified coronary lesion    5. Chronic combined systolic and diastolic heart failure    6. Atherosclerosis      Plan:     Cardiomyopathy, likely ischemic  Most recent echo in 3/2019 with LVEF 40% with normal RV function  Recent angiogram with patient LIMA-LAD, SVG-OM; SVG-RCA  with L->R collaterals from LAD  Continue toprol 100mg daily.  Holding ACEi due to ELIEZER and hyperkalemia.    Most likely etiology of recurrent ascites is liver disease.   Intravascularly appears euvolemic. Recent echo with IVC pressure of 3 while ascites was present.   Has no jugular venous distension.   Has normal RV function; would expect R sided heart failure if there were an underlying cardiac etiology to explain recurrent ascites.   Recent C with patent L sided grafts and collaterals present to  of RCA/SVG-RCA; no additional revascularization needed.      Continue lasix 40mg daily.  Unable to use spironolactone for ascites due to ELIEZER and hyperkalemia.  Will contact hepatology for paracenteses and for diuretic management for recurrent ascites.    Liver disease  - recent biopsy without cirrhosis at biopsy location  - CT abd 3/16/19 with "findings consistent with the patient's known liver disease including lobulated hepatic contour, splenomegaly, and ascites"  - abdominal US 3/13/19 with "The liver measures 13.3 cm and demonstrates a nodular contour suggesting cirrhosis."    Coronary artery disease due to calcified coronary lesion  S/p CABG (LIMA-LAD, SVG-OM,  RCA with L->R collaterals - [facility] 3/20/2019)  No ischemic symptoms.   Continue plavix 75mg daily.   Continue " beta blocker, as above.  LDL 42 due to cirrhosis. Continue atorvastatin 40mg daily.    Paroxysmal atrial fibrillation  Discontinue amiodarone, given liver disease and episode precipitated by large volume paracentesis  On beta blocker, as above, for rate control.  On eliquis 5mg BID for anticoagulation    Essential hypertension  No longer on anti-hypertensives.  Now with low blood pressures.    Mixed hyperlipidemia  LDL 42 in 1/2019    Lymphedema  Prior success with brinda boots.    Signed:  Samuel Carney MD  Cardiology     4/4/2019 2:48 PM    Follow-up:     Future Appointments   Date Time Provider Department Center   4/4/2019  1:00 PM LAB, [place] [facility] [place]   4/4/2019  1:30 PM Jaime Carney III, MD [facility] CARDIO [place]   4/22/2019  1:00 PM INJECTION, INFECTIOUS DISEASES [facility] ID INJ [facility]   4/25/2019  8:30 AM [facility] GASTRO [facility]   4/29/2019  4:00 PM Jose Christensen MD [facility] HEPAT [facility]

## 2019-04-04 ENCOUNTER — OFFICE VISIT (OUTPATIENT)
Dept: CARDIOLOGY | Facility: CLINIC | Age: 64
End: 2019-04-04
Payer: COMMERCIAL

## 2019-04-04 ENCOUNTER — LAB VISIT (OUTPATIENT)
Dept: LAB | Facility: HOSPITAL | Age: 64
End: 2019-04-04
Attending: INTERNAL MEDICINE
Payer: COMMERCIAL

## 2019-04-04 VITALS
DIASTOLIC BLOOD PRESSURE: 56 MMHG | BODY MASS INDEX: 44.79 KG/M2 | WEIGHT: 285.38 LBS | SYSTOLIC BLOOD PRESSURE: 97 MMHG | HEART RATE: 70 BPM | HEIGHT: 67 IN

## 2019-04-04 DIAGNOSIS — I25.10 CORONARY ARTERY DISEASE DUE TO CALCIFIED CORONARY LESION: ICD-10-CM

## 2019-04-04 DIAGNOSIS — I50.42 CHRONIC COMBINED SYSTOLIC AND DIASTOLIC HEART FAILURE: ICD-10-CM

## 2019-04-04 DIAGNOSIS — I10 ESSENTIAL HYPERTENSION: ICD-10-CM

## 2019-04-04 DIAGNOSIS — E78.2 MIXED HYPERLIPIDEMIA: ICD-10-CM

## 2019-04-04 DIAGNOSIS — N17.9 AKI (ACUTE KIDNEY INJURY): ICD-10-CM

## 2019-04-04 DIAGNOSIS — I48.0 PAROXYSMAL ATRIAL FIBRILLATION: Primary | ICD-10-CM

## 2019-04-04 DIAGNOSIS — I25.84 CORONARY ARTERY DISEASE DUE TO CALCIFIED CORONARY LESION: ICD-10-CM

## 2019-04-04 DIAGNOSIS — I70.90 ATHEROSCLEROSIS: ICD-10-CM

## 2019-04-04 LAB
ALBUMIN SERPL BCP-MCNC: 2.3 G/DL (ref 3.5–5.2)
ALP SERPL-CCNC: 134 U/L (ref 55–135)
ALT SERPL W/O P-5'-P-CCNC: 17 U/L (ref 10–44)
ANION GAP SERPL CALC-SCNC: 7 MMOL/L (ref 8–16)
AST SERPL-CCNC: 19 U/L (ref 10–40)
BILIRUB SERPL-MCNC: 0.4 MG/DL (ref 0.1–1)
BUN SERPL-MCNC: 48 MG/DL (ref 8–23)
CALCIUM SERPL-MCNC: 7.7 MG/DL (ref 8.7–10.5)
CHLORIDE SERPL-SCNC: 104 MMOL/L (ref 95–110)
CO2 SERPL-SCNC: 18 MMOL/L (ref 23–29)
CREAT SERPL-MCNC: 1.9 MG/DL (ref 0.5–1.4)
EST. GFR  (AFRICAN AMERICAN): 42.1 ML/MIN/1.73 M^2
EST. GFR  (NON AFRICAN AMERICAN): 36.4 ML/MIN/1.73 M^2
GLUCOSE SERPL-MCNC: 220 MG/DL (ref 70–110)
POTASSIUM SERPL-SCNC: 5.1 MMOL/L (ref 3.5–5.1)
PROT SERPL-MCNC: 5.2 G/DL (ref 6–8.4)
SODIUM SERPL-SCNC: 129 MMOL/L (ref 136–145)

## 2019-04-04 PROCEDURE — 36415 COLL VENOUS BLD VENIPUNCTURE: CPT | Mod: PO

## 2019-04-04 PROCEDURE — 3008F PR BODY MASS INDEX (BMI) DOCUMENTED: ICD-10-PCS | Mod: CPTII,S$GLB,, | Performed by: INTERNAL MEDICINE

## 2019-04-04 PROCEDURE — 3074F SYST BP LT 130 MM HG: CPT | Mod: CPTII,S$GLB,, | Performed by: INTERNAL MEDICINE

## 2019-04-04 PROCEDURE — 3074F PR MOST RECENT SYSTOLIC BLOOD PRESSURE < 130 MM HG: ICD-10-PCS | Mod: CPTII,S$GLB,, | Performed by: INTERNAL MEDICINE

## 2019-04-04 PROCEDURE — 3008F BODY MASS INDEX DOCD: CPT | Mod: CPTII,S$GLB,, | Performed by: INTERNAL MEDICINE

## 2019-04-04 PROCEDURE — 99499 RISK ADDL DX/OHS AUDIT: ICD-10-PCS | Mod: S$GLB,,, | Performed by: INTERNAL MEDICINE

## 2019-04-04 PROCEDURE — 80053 COMPREHEN METABOLIC PANEL: CPT

## 2019-04-04 PROCEDURE — 3078F DIAST BP <80 MM HG: CPT | Mod: CPTII,S$GLB,, | Performed by: INTERNAL MEDICINE

## 2019-04-04 PROCEDURE — 99499 UNLISTED E&M SERVICE: CPT | Mod: S$GLB,,, | Performed by: INTERNAL MEDICINE

## 2019-04-04 PROCEDURE — 99999 PR PBB SHADOW E&M-EST. PATIENT-LVL III: ICD-10-PCS | Mod: PBBFAC,,, | Performed by: INTERNAL MEDICINE

## 2019-04-04 PROCEDURE — 99214 OFFICE O/P EST MOD 30 MIN: CPT | Mod: S$GLB,,, | Performed by: INTERNAL MEDICINE

## 2019-04-04 PROCEDURE — 99214 PR OFFICE/OUTPT VISIT, EST, LEVL IV, 30-39 MIN: ICD-10-PCS | Mod: S$GLB,,, | Performed by: INTERNAL MEDICINE

## 2019-04-04 PROCEDURE — 3078F PR MOST RECENT DIASTOLIC BLOOD PRESSURE < 80 MM HG: ICD-10-PCS | Mod: CPTII,S$GLB,, | Performed by: INTERNAL MEDICINE

## 2019-04-04 PROCEDURE — 99999 PR PBB SHADOW E&M-EST. PATIENT-LVL III: CPT | Mod: PBBFAC,,, | Performed by: INTERNAL MEDICINE

## 2019-04-04 RX ORDER — OMEPRAZOLE 40 MG/1
40 CAPSULE, DELAYED RELEASE ORAL DAILY
COMMUNITY
Start: 2017-09-14 | End: 2019-04-04 | Stop reason: SDUPTHER

## 2019-04-05 DIAGNOSIS — R18.8 OTHER ASCITES: Primary | ICD-10-CM

## 2019-04-09 ENCOUNTER — HOSPITAL ENCOUNTER (OUTPATIENT)
Dept: INTERVENTIONAL RADIOLOGY/VASCULAR | Facility: HOSPITAL | Age: 64
Discharge: HOME | End: 2019-04-09
Attending: NURSE PRACTITIONER
Payer: COMMERCIAL

## 2019-04-09 VITALS
SYSTOLIC BLOOD PRESSURE: 94 MMHG | OXYGEN SATURATION: 99 % | DIASTOLIC BLOOD PRESSURE: 53 MMHG | RESPIRATION RATE: 18 BRPM | HEART RATE: 79 BPM

## 2019-04-09 DIAGNOSIS — R18.8 OTHER ASCITES: ICD-10-CM

## 2019-04-09 LAB
APPEARANCE FLD: NORMAL
BODY FLD TYPE: NORMAL
COLOR FLD: YELLOW
EOSINOPHIL NFR FLD MANUAL: 1 %
LYMPHOCYTES NFR FLD MANUAL: 56 %
MESOTHL CELL NFR FLD MANUAL: 13 %
MONOS+MACROS NFR FLD MANUAL: 9 %
NEUTROPHILS NFR FLD MANUAL: 21 %
WBC # FLD: 200 /CU MM

## 2019-04-09 PROCEDURE — 87075 CULTR BACTERIA EXCEPT BLOOD: CPT

## 2019-04-09 PROCEDURE — 63600175 PHARM REV CODE 636 W HCPCS: Mod: JG | Performed by: NURSE PRACTITIONER

## 2019-04-09 PROCEDURE — 49083 ABD PARACENTESIS W/IMAGING: CPT

## 2019-04-09 PROCEDURE — 49083 IR PARACENTESIS WITH IMAGING: ICD-10-PCS | Mod: ,,, | Performed by: FAMILY MEDICINE

## 2019-04-09 PROCEDURE — 49083 ABD PARACENTESIS W/IMAGING: CPT | Mod: ,,, | Performed by: FAMILY MEDICINE

## 2019-04-09 PROCEDURE — 87070 CULTURE OTHR SPECIMN AEROBIC: CPT

## 2019-04-09 PROCEDURE — 89051 BODY FLUID CELL COUNT: CPT

## 2019-04-09 PROCEDURE — P9047 ALBUMIN (HUMAN), 25%, 50ML: HCPCS | Mod: JG | Performed by: NURSE PRACTITIONER

## 2019-04-09 RX ORDER — ALBUMIN HUMAN 250 G/1000ML
37.5 SOLUTION INTRAVENOUS ONCE
Status: COMPLETED | OUTPATIENT
Start: 2019-04-09 | End: 2019-04-09

## 2019-04-09 RX ADMIN — ALBUMIN (HUMAN) 37.5 G: 25 SOLUTION INTRAVENOUS at 11:04

## 2019-04-09 NOTE — PROGRESS NOTES
Called to bedside. Catheter dislodged and out of patient. Offered patient repeating paracentesis. Patient agreed. Paracentesis repeated on LLQ.     Radiology Post-Procedure Note    Pre Op Diagnosis: Ascites  Post Op Diagnosis: Same    Procedure: Ultrasound Guided Paracentesis    Procedure performed by: Cele GARCIA, Reyna     Written Informed Consent Obtained: Yes  Specimen Removed: YES linda fluid  Estimated Blood Loss: Minimal    Findings:   Successful paracentesis.  Albumin administered PRN per protocol.    Patient tolerated procedure well.    Reyna Oakley, APRN, FNP  Interventional Radiology  [phone] [employer]

## 2019-04-09 NOTE — PROGRESS NOTES
Para completed, pt tolerated well. No apparent distress noted. 6.1 Liters removed, mepore applied CDI. Labs collected and sent. Albumin 150 ml given per protocol.  Discharge instructions reviewed and acknowledged. Pt discharged via wheelchair and private vehicle.

## 2019-04-09 NOTE — PROCEDURES
Radiology Post-Procedure Note    Pre Op Diagnosis: Ascites  Post Op Diagnosis: Same    Procedure: Ultrasound Guided Paracentesis    Procedure performed by: Cele GARCIA, Reyna     Written Informed Consent Obtained: Yes  Specimen Removed: YES linda fluid  Estimated Blood Loss: Minimal    Findings:   Successful paracentesis.  Albumin administered PRN per protocol.    Patient tolerated procedure well.    Reyna Oakley, APRN, FNP  Interventional Radiology  [phone] [employer]

## 2019-04-09 NOTE — DISCHARGE INSTRUCTIONS
[facility] [phone] (MON-FRI 8 AM- 5PM). Radiology Resident on call [phone].

## 2019-04-09 NOTE — H&P
Radiology History & Physical      SUBJECTIVE:     Chief Complaint: abdominal distention    History of Present Illness:  Jian Arrieta is a 63 y.o. male who presents for ultrasound guided paracentesis  Past Medical History:   Diagnosis Date    Alcohol abuse     Anasarca 1/28/2019    Arthropathy associated with neurological disorder 9/2/2015    Atherosclerosis     Charcot foot due to diabetes mellitus     Chronic combined systolic and diastolic heart failure 01/29/2019 1-28-19 Left VentricleModerate decreased ejection fraction at 30%. Normal left ventricular cavity size. Normal wall thickness observed. Grade I (mild) left ventricular diastolic dysfunction consistent with impaired relaxation. Normal left atrial pressure. Moderate, global hypokinesis(see wall scoring diagram). Right VentricleNormal cavity size, wall thickness and ejection fraction. Wall motion n    Chronic pancreatitis 1/28/2019    Colon polyps     approx 5 yrs ago    Coronary artery disease due to calcified coronary lesion 05/08/2015    5 stents on ASA      Diabetic polyneuropathy associated with type 2 diabetes mellitus 9/2/2015    Diverticulosis 1/28/2019    DM type 2 with diabetic peripheral neuropathy 2/4/2019    Essential hypertension 1/28/2019    Former smoker 8/26/2015    Healed ulcer of left foot on examination 6/20/2017    Hydrocele     approx 1.5 yrs ago    Hypoalbuminemia 2/4/2019    Lymphedema of both lower extremities 1/29/2019    Mixed hyperlipidemia 5/8/2015    Morbid obesity with BMI of 50.0-59.9, adult 5/8/2015    Onychomycosis of multiple toenails with type 2 diabetes mellitus and peripheral neuropathy 6/20/2017    Perianal cyst     approx 2 yrs ago    Pseudocyst of pancreas 1/28/2019 1-28-19 Liver has a cirrhotic morphology with no focal lesions.  Significant interval increase in ascites when compared to prior exam which may account for patient's abdominal distension.  Hypodense air-fluid collection  along the body of the pancreas which is slightly smaller when compared to prior CT.  Findings may relate to pancreatic necrosis with pancreatic pseudocysts with infected pseudocyst    Skin cancer     skin cancer    Sleep apnea 8/26/2015    Status post bariatric surgery 1/11/2016    Type 2 diabetes mellitus, with long-term current use of insulin 5/8/2015     Past Surgical History:   Procedure Laterality Date    ANGIOGRAM, CORONARY ARTERY Right 3/20/2019    Performed by Bob Duque MD at [facility] CATH LAB    ANGIOPLASTY      total x5 stents    Bypass graft study  3/20/2019    Performed by Bob Duque MD at [facility] CATH LAB    COLONOSCOPY N/A 10/6/2015    Performed by Shekhar Richards MD at [facility] ENDO (2ND FLR)    CORONARY ARTERY BYPASS GRAFT  2017    x3    CYST REMOVAL      GASTRECTOMY      GASTRECTOMY-SLEEVE-LAPAROSCOPIC - 08993 N/A 12/22/2015    Performed by Micheal Villavicencio Jr., MD at [facility] OR 2ND FLR    KNEE ARTHROSCOPY      perianal surgery      perianal cyst removed       Home Meds:   Prior to Admission medications    Medication Sig Start Date End Date Taking? Authorizing Provider   apixaban (ELIQUIS) 5 mg Tab Take 1 tablet (5 mg total) by mouth 2 (two) times daily. 3/20/19   Lorie Higuera MD   atorvastatin (LIPITOR) 40 MG tablet Take 1 tablet (40 mg total) by mouth once daily. 2/4/19 2/4/20  Alfonso Mahmood MD   clopidogrel (PLAVIX) 75 mg tablet Take 1 tablet (75 mg total) by mouth once daily. 3/21/19 3/20/20  Lorie Higuera MD   cyanocobalamin, vitamin B-12, 500 mcg Subl Place 1 tablet under the tongue every Monday.     Historical Provider, MD   furosemide (LASIX) 40 MG tablet Take 1 tablet (40 mg total) by mouth once daily. 2/4/19 2/4/20  Alfonso Mahmood MD   insulin aspart U-100 (NOVOLOG) 100 unit/mL injection Inject 4 Units into the skin 3 (three) times daily before meals.    Historical Provider, MD   insulin detemir (LEVEMIR FLEXPEN SUBQ) Inject 12 Units into the skin once daily.      Historical Provider, MD   lipase-protease-amylase (CREON) 36,000-114,000- 180,000 unit CpDR Take 1 capsule by mouth 3 (three) times daily. 2/4/19   Alfonso Mahmood MD   metoprolol succinate (TOPROL-XL) 100 MG 24 hr tablet Take 1 tablet (100 mg total) by mouth once daily. 2/4/19 2/4/20  Alfonso Mahmood MD   omeprazole (PRILOSEC) 40 MG capsule Take 40 mg by mouth once daily. 2/18/19   Historical Provider, MD   ONETOUCH ULTRA TEST Strp 4 (four) times daily. Use to test blood sugar four times a day. 10/15/15   Historical Provider, MD   tamsulosin (FLOMAX) 0.4 mg Cap Take 1 capsule by mouth once daily. Pt has not started taking as of 2/27/19. 2/5/19   Historical Provider, MD   vitamin D (VITAMIN D3) 1000 units Tab Take 1 tablet (1,000 Units total) by mouth once daily. 1/31/19   Jian Lambert MD     Anticoagulants/Antiplatelets: Plavix and eliquis    Allergies: Review of patient's allergies indicates:  No Known Allergies  Sedation History:  no adverse reactions    Review of Systems:   Hematological: no known coagulopathies  Respiratory: no shortness of breath  Cardiovascular: no chest pain  Gastrointestinal: no abdominal pain  Genito-Urinary: no dysuria  Musculoskeletal: negative  Neurological: no TIA or stroke symptoms         OBJECTIVE:     Vital Signs (Most Recent)       Physical Exam:  ASA: 2  Mallampati: n/a    General: no acute distress  Mental Status: alert and oriented to person, place and time  HEENT: normocephalic, atraumatic  Chest: unlabored breathing  Heart: regular heart rate  Abdomen: distended  Extremity: moves all extremities    Laboratory  Lab Results   Component Value Date    INR 1.3 (H) 03/27/2019       Lab Results   Component Value Date    WBC 4.67 03/19/2019    HGB 9.0 (L) 03/19/2019    HCT 27.8 (L) 03/19/2019    MCV 88 03/19/2019     03/19/2019      Lab Results   Component Value Date     (H) 04/04/2019     (L) 04/04/2019    K 5.1 04/04/2019     04/04/2019    CO2 18 (L)  04/04/2019    BUN 48 (H) 04/04/2019    CREATININE 1.9 (H) 04/04/2019    CALCIUM 7.7 (L) 04/04/2019    MG 1.6 03/15/2019    ALT 17 04/04/2019    AST 19 04/04/2019    ALBUMIN 2.3 (L) 04/04/2019    BILITOT 0.4 04/04/2019    BILIDIR 0.2 07/10/2015       ASSESSMENT/PLAN:     Sedation Plan: local  Patient will undergo ultrasound guided paracentesis.    ANDREW Valerio, FNP  Interventional Radiology  [phone] [employer]

## 2019-04-12 ENCOUNTER — OFFICE VISIT (OUTPATIENT)
Dept: URGENT CARE | Facility: CLINIC | Age: 64
End: 2019-04-12
Payer: COMMERCIAL

## 2019-04-12 VITALS
HEART RATE: 67 BPM | DIASTOLIC BLOOD PRESSURE: 57 MMHG | TEMPERATURE: 97 F | HEIGHT: 67 IN | WEIGHT: 278 LBS | BODY MASS INDEX: 43.63 KG/M2 | OXYGEN SATURATION: 100 % | SYSTOLIC BLOOD PRESSURE: 84 MMHG
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
## 2019-04-12 DIAGNOSIS — T14.8XXA BLEEDING FROM WOUND: ICD-10-CM

## 2019-04-12 LAB — BACTERIA SPEC AEROBE CULT: NO GROWTH

## 2019-04-12 PROCEDURE — 3078F PR MOST RECENT DIASTOLIC BLOOD PRESSURE < 80 MM HG: ICD-10-PCS | Mod: CPTII,S$GLB,, | Performed by: STUDENT IN AN ORGANIZED HEALTH CARE EDUCATION/TRAINING PROGRAM

## 2019-04-12 PROCEDURE — 3078F DIAST BP <80 MM HG: CPT | Mod: CPTII,S$GLB,, | Performed by: STUDENT IN AN ORGANIZED HEALTH CARE EDUCATION/TRAINING PROGRAM

## 2019-04-12 PROCEDURE — 99214 OFFICE O/P EST MOD 30 MIN: CPT | Mod: S$GLB,,, | Performed by: STUDENT IN AN ORGANIZED HEALTH CARE EDUCATION/TRAINING PROGRAM

## 2019-04-12 PROCEDURE — 3008F PR BODY MASS INDEX (BMI) DOCUMENTED: ICD-10-PCS | Mod: CPTII,S$GLB,, | Performed by: STUDENT IN AN ORGANIZED HEALTH CARE EDUCATION/TRAINING PROGRAM

## 2019-04-12 PROCEDURE — 99214 PR OFFICE/OUTPT VISIT, EST, LEVL IV, 30-39 MIN: ICD-10-PCS | Mod: S$GLB,,, | Performed by: STUDENT IN AN ORGANIZED HEALTH CARE EDUCATION/TRAINING PROGRAM

## 2019-04-12 PROCEDURE — 3008F BODY MASS INDEX DOCD: CPT | Mod: CPTII,S$GLB,, | Performed by: STUDENT IN AN ORGANIZED HEALTH CARE EDUCATION/TRAINING PROGRAM

## 2019-04-12 PROCEDURE — 3074F PR MOST RECENT SYSTOLIC BLOOD PRESSURE < 130 MM HG: ICD-10-PCS | Mod: CPTII,S$GLB,, | Performed by: STUDENT IN AN ORGANIZED HEALTH CARE EDUCATION/TRAINING PROGRAM

## 2019-04-12 PROCEDURE — 3074F SYST BP LT 130 MM HG: CPT | Mod: CPTII,S$GLB,, | Performed by: STUDENT IN AN ORGANIZED HEALTH CARE EDUCATION/TRAINING PROGRAM

## 2019-04-12 RX ORDER — AMOXICILLIN AND CLAVULANATE POTASSIUM 875; 125 MG/1; MG/1
1 TABLET, FILM COATED ORAL EVERY 12 HOURS
Qty: 10 TABLET | Refills: 0 | Status: SHIPPED | OUTPATIENT
Start: 2019-04-12 | End: 2019-04-17

## 2019-04-12 NOTE — PROGRESS NOTES
"Subjective:       Patient ID: Jian Arrieta is a 63 y.o. male.    Vitals:  height is 5' 7" (1.702 m) and weight is 126.1 kg (278 lb). His oral temperature is 96.7 °F (35.9 °C). His blood pressure is 84/57 (abnormal) and his pulse is 67. His oxygen saturation is 100%.     Chief Complaint: Abrasion    Pt reports being scratched by dog yesterday afternoon ~1600. Had fever o/n with continued bleeding from wound. NP told pt to present to ER/UC for treatment.    Injury   This is a new problem. The current episode started yesterday. The problem occurs constantly. The problem has been unchanged. Associated symptoms include chills and a fever. Pertinent negatives include no abdominal pain, arthralgias, change in bowel habit, chest pain, diaphoresis, fatigue, headaches, joint swelling, myalgias, nausea, neck pain, numbness, rash, swollen glands, vomiting or weakness. Nothing aggravates the symptoms. Treatments tried: clean and wrapped, otc ointment. The treatment provided mild relief.       Constitution: Positive for chills and fever. Negative for sweating and fatigue.   HENT: Negative for facial swelling and facial trauma.    Neck: Negative for neck pain and neck stiffness.   Cardiovascular: Negative for chest trauma and chest pain.   Eyes: Negative for eye trauma, double vision and blurred vision.   Gastrointestinal: Negative for abdominal trauma, abdominal pain, nausea and vomiting.   Genitourinary: Negative for pelvic pain.   Musculoskeletal: Negative for pain, trauma, joint pain, joint swelling, abnormal ROM of joint, pain with walking and muscle ache.   Skin: Positive for wound and abrasion. Negative for color change, rash, laceration and erythema.   Neurological: Negative for dizziness, light-headedness, coordination disturbances, headaches, altered mental status, loss of consciousness and numbness.   Hematologic/Lymphatic: Negative for history of bleeding disorder.   Psychiatric/Behavioral: Negative for altered " mental status.       Objective:       Vitals:    04/12/19 1127   BP: (!) 84/57   Pulse: 67   Temp: 96.7 °F (35.9 °C)   *Per pt and chart review, hypotensive BP normal    Physical Exam   Constitutional: He appears well-developed and well-nourished.   HENT:   Head: Normocephalic and atraumatic.   Nose: Nose normal.   Cardiovascular: Normal rate, regular rhythm and intact distal pulses.   Musculoskeletal: He exhibits edema (2+ pitting b/l, chronic). He exhibits no tenderness.        Legs:  ~5cm clean wound with minimal bleeding inferiorly   Skin: Skin is warm and dry. He is not diaphoretic. No erythema.   Psychiatric: He has a normal mood and affect. His behavior is normal. Judgment normal.   Vitals reviewed.      Assessment:       1. Dog scratch    2. Bleeding from wound        Plan:         Dog scratch  -     amoxicillin-clavulanate 875-125mg (AUGMENTIN) 875-125 mg per tablet; Take 1 tablet by mouth every 12 (twelve) hours. for 5 days  Dispense: 10 tablet; Refill: 0  - medications reviewed with patient, questions answered, and return precautions given    Bleeding from wound   - stopped with silver nitrate application and pressure; wound redressed and supplies for dressing change given   - wound care to re-evaluate on Monday, 04/15    Follow up in about 1 week (around 4/19/2019), or if symptoms worsen or fail to improve.    Rakan Claudio MD/MPH  [facility]  [facility]

## 2019-04-12 NOTE — PATIENT INSTRUCTIONS
Animal Bites and Scratches     Healthcare provider giving injection in man's arm.     Most bites and scratches from household pets are nothing to worry about. But some bites or scratches can be serious. Others may become infected or pose the risk of rabies. For that reason, it's best to seek medical treatment for all but the most minor bites.  Report severe animal bites to animal control or your local health department.   When to go to the emergency room (ER)  A bite that barely breaks the skin usually isn't cause for concern. But seek emergency medical care if you:  · Have a deep puncture wound or badly torn skin  · Have redness, swelling, or warmth near the wound  · Think you may have a broken bone or other serious injury  · The attack was unprovoked and you don't know the animal (rabies must be ruled out)  · Are bitten by a domestic cat or a wild animal, such as a skunk, raccoon, or bat  · Do not have a spleen or have a weak immune system  What to expect in the ER  · The wound will be carefully cleaned and inspected.  · X-rays may be taken if deep damage is suspected or to make sure a small piece of the animal's tooth is not left embedded in the wound.  · Although not common, infection can occur, especially if you have a cat bite, deep wound, or weak immune system. You may be given antibiotics to help prevent this.  · You may be given a tetanus shot if you haven't had one in the past 5 years. If rabies is a concern, you may be given shots to protect you.  · If serious tissue or joint damage has been done, you may be referred to a plastic or orthopedic surgeon.  Follow-up care  You will likely need to see your doctor within a day or two of receiving emergency medical treatment. In the meantime, watch for signs of infection. These include:  · Fever of 100.4°F (38°C) or higher, or as directed by your healthcare provider  · Swelling  · Redness  · Pus draining from the wound  Date Last Reviewed: 12/1/2016  ©  6951-9967 [employer]. [address] [place] [address], [place] [address]. All rights reserved. This information is not intended as a substitute for professional medical care. Always follow your healthcare professional's instructions.

## 2019-04-16 ENCOUNTER — HOSPITAL ENCOUNTER (OUTPATIENT)
Dept: INTERVENTIONAL RADIOLOGY/VASCULAR | Facility: HOSPITAL | Age: 64
Discharge: HOME | End: 2019-04-16
Attending: NURSE PRACTITIONER
Payer: COMMERCIAL

## 2019-04-16 ENCOUNTER — OFFICE VISIT (OUTPATIENT)
Dept: INTERNAL MEDICINE | Facility: CLINIC | Age: 64
End: 2019-04-16
Payer: COMMERCIAL

## 2019-04-16 VITALS
DIASTOLIC BLOOD PRESSURE: 52 MMHG | OXYGEN SATURATION: 99 % | SYSTOLIC BLOOD PRESSURE: 90 MMHG | RESPIRATION RATE: 18 BRPM | HEART RATE: 74 BPM

## 2019-04-16 VITALS
DIASTOLIC BLOOD PRESSURE: 56 MMHG | HEIGHT: 67 IN | BODY MASS INDEX: 44.33 KG/M2 | WEIGHT: 282.44 LBS | SYSTOLIC BLOOD PRESSURE: 95 MMHG | HEART RATE: 93 BPM | OXYGEN SATURATION: 99 %

## 2019-04-16 DIAGNOSIS — G47.33 OBSTRUCTIVE SLEEP APNEA SYNDROME: ICD-10-CM

## 2019-04-16 DIAGNOSIS — E78.2 MIXED HYPERLIPIDEMIA: ICD-10-CM

## 2019-04-16 DIAGNOSIS — R18.8 OTHER ASCITES: Primary | ICD-10-CM

## 2019-04-16 DIAGNOSIS — E11.42 DM TYPE 2 WITH DIABETIC PERIPHERAL NEUROPATHY: Primary | ICD-10-CM

## 2019-04-16 DIAGNOSIS — I25.10 CORONARY ARTERY DISEASE DUE TO CALCIFIED CORONARY LESION: ICD-10-CM

## 2019-04-16 DIAGNOSIS — I25.84 CORONARY ARTERY DISEASE DUE TO CALCIFIED CORONARY LESION: ICD-10-CM

## 2019-04-16 DIAGNOSIS — I89.0 LYMPHEDEMA OF BOTH LOWER EXTREMITIES: ICD-10-CM

## 2019-04-16 DIAGNOSIS — R18.8 OTHER ASCITES: ICD-10-CM

## 2019-04-16 DIAGNOSIS — K86.1 CHRONIC PANCREATITIS, UNSPECIFIED PANCREATITIS TYPE: ICD-10-CM

## 2019-04-16 DIAGNOSIS — I48.0 PAROXYSMAL ATRIAL FIBRILLATION: ICD-10-CM

## 2019-04-16 LAB
APPEARANCE FLD: NORMAL
BACTERIA SPEC ANAEROBE CULT: NORMAL
BODY FLD TYPE: NORMAL
COLOR FLD: YELLOW
GRAM STN SPEC: NORMAL
GRAM STN SPEC: NORMAL
LYMPHOCYTES NFR FLD MANUAL: 78 %
MESOTHL CELL NFR FLD MANUAL: 4 %
MONOS+MACROS NFR FLD MANUAL: 6 %
NEUTROPHILS NFR FLD MANUAL: 12 %
WBC # FLD: 187 /CU MM

## 2019-04-16 PROCEDURE — 87070 CULTURE OTHR SPECIMN AEROBIC: CPT

## 2019-04-16 PROCEDURE — 49083 IR PARACENTESIS WITH IMAGING: ICD-10-PCS | Mod: ,,, | Performed by: FAMILY MEDICINE

## 2019-04-16 PROCEDURE — 87205 SMEAR GRAM STAIN: CPT

## 2019-04-16 PROCEDURE — 3078F PR MOST RECENT DIASTOLIC BLOOD PRESSURE < 80 MM HG: ICD-10-PCS | Mod: CPTII,S$GLB,, | Performed by: STUDENT IN AN ORGANIZED HEALTH CARE EDUCATION/TRAINING PROGRAM

## 2019-04-16 PROCEDURE — 3045F PR MOST RECENT HEMOGLOBIN A1C LEVEL 7.0-9.0%: ICD-10-PCS | Mod: CPTII,S$GLB,, | Performed by: STUDENT IN AN ORGANIZED HEALTH CARE EDUCATION/TRAINING PROGRAM

## 2019-04-16 PROCEDURE — 99214 OFFICE O/P EST MOD 30 MIN: CPT | Mod: S$GLB,,, | Performed by: STUDENT IN AN ORGANIZED HEALTH CARE EDUCATION/TRAINING PROGRAM

## 2019-04-16 PROCEDURE — 3074F SYST BP LT 130 MM HG: CPT | Mod: CPTII,S$GLB,, | Performed by: STUDENT IN AN ORGANIZED HEALTH CARE EDUCATION/TRAINING PROGRAM

## 2019-04-16 PROCEDURE — 3045F PR MOST RECENT HEMOGLOBIN A1C LEVEL 7.0-9.0%: CPT | Mod: CPTII,S$GLB,, | Performed by: STUDENT IN AN ORGANIZED HEALTH CARE EDUCATION/TRAINING PROGRAM

## 2019-04-16 PROCEDURE — 99214 PR OFFICE/OUTPT VISIT, EST, LEVL IV, 30-39 MIN: ICD-10-PCS | Mod: S$GLB,,, | Performed by: STUDENT IN AN ORGANIZED HEALTH CARE EDUCATION/TRAINING PROGRAM

## 2019-04-16 PROCEDURE — 99999 PR PBB SHADOW E&M-EST. PATIENT-LVL IV: ICD-10-PCS | Mod: PBBFAC,,, | Performed by: STUDENT IN AN ORGANIZED HEALTH CARE EDUCATION/TRAINING PROGRAM

## 2019-04-16 PROCEDURE — 3008F PR BODY MASS INDEX (BMI) DOCUMENTED: ICD-10-PCS | Mod: CPTII,S$GLB,, | Performed by: STUDENT IN AN ORGANIZED HEALTH CARE EDUCATION/TRAINING PROGRAM

## 2019-04-16 PROCEDURE — 63600175 PHARM REV CODE 636 W HCPCS: Mod: JG | Performed by: NURSE PRACTITIONER

## 2019-04-16 PROCEDURE — 49083 ABD PARACENTESIS W/IMAGING: CPT | Mod: ,,, | Performed by: FAMILY MEDICINE

## 2019-04-16 PROCEDURE — 3074F PR MOST RECENT SYSTOLIC BLOOD PRESSURE < 130 MM HG: ICD-10-PCS | Mod: CPTII,S$GLB,, | Performed by: STUDENT IN AN ORGANIZED HEALTH CARE EDUCATION/TRAINING PROGRAM

## 2019-04-16 PROCEDURE — 99999 PR PBB SHADOW E&M-EST. PATIENT-LVL IV: CPT | Mod: PBBFAC,,, | Performed by: STUDENT IN AN ORGANIZED HEALTH CARE EDUCATION/TRAINING PROGRAM

## 2019-04-16 PROCEDURE — P9047 ALBUMIN (HUMAN), 25%, 50ML: HCPCS | Mod: JG | Performed by: NURSE PRACTITIONER

## 2019-04-16 PROCEDURE — 87075 CULTR BACTERIA EXCEPT BLOOD: CPT

## 2019-04-16 PROCEDURE — 3008F BODY MASS INDEX DOCD: CPT | Mod: CPTII,S$GLB,, | Performed by: STUDENT IN AN ORGANIZED HEALTH CARE EDUCATION/TRAINING PROGRAM

## 2019-04-16 PROCEDURE — 49083 ABD PARACENTESIS W/IMAGING: CPT

## 2019-04-16 PROCEDURE — 89051 BODY FLUID CELL COUNT: CPT

## 2019-04-16 PROCEDURE — 3078F DIAST BP <80 MM HG: CPT | Mod: CPTII,S$GLB,, | Performed by: STUDENT IN AN ORGANIZED HEALTH CARE EDUCATION/TRAINING PROGRAM

## 2019-04-16 RX ORDER — ALBUMIN HUMAN 250 G/1000ML
25 SOLUTION INTRAVENOUS ONCE
Status: COMPLETED | OUTPATIENT
Start: 2019-04-16 | End: 2019-04-16

## 2019-04-16 RX ORDER — ALBUMIN HUMAN 250 G/1000ML
25 SOLUTION INTRAVENOUS ONCE
Status: DISCONTINUED | OUTPATIENT
Start: 2019-04-16 | End: 2019-04-16

## 2019-04-16 RX ADMIN — ALBUMIN (HUMAN) 25 G: 25 SOLUTION INTRAVENOUS at 10:04

## 2019-04-16 NOTE — PROGRESS NOTES
Paracentesis complete. PT tolerated well. Site clean, dry, intact, no bleeding, no hematoma. Pt given site care and discharge instructions. Pt verbalized understanding,. Pt discharged home in wheelchair.

## 2019-04-16 NOTE — PROCEDURES
Radiology Post-Procedure Note    Pre Op Diagnosis: Ascites  Post Op Diagnosis: Same    Procedure: Ultrasound Guided Paracentesis    Procedure performed by: Cele GARCIA, Reyna     Written Informed Consent Obtained: Yes  Specimen Removed: YES cloudy linda  Estimated Blood Loss: Minimal    Findings:   Successful paracentesis.  Albumin administered PRN per protocol.    Patient tolerated procedure well.    Reyna Oakley, APRN, FNP  Interventional Radiology  [phone] spectralink

## 2019-04-16 NOTE — H&P
Radiology History & Physical      SUBJECTIVE:     Chief Complaint: abdominal distention    History of Present Illness:  Jian Arrieta is a 63 y.o. male who presents for ultrasound guided paracentesis  Past Medical History:   Diagnosis Date    Alcohol abuse     Anasarca 1/28/2019    Arthropathy associated with neurological disorder 9/2/2015    Atherosclerosis     Charcot foot due to diabetes mellitus     Chronic combined systolic and diastolic heart failure 01/29/2019 1-28-19 Left VentricleModerate decreased ejection fraction at 30%. Normal left ventricular cavity size. Normal wall thickness observed. Grade I (mild) left ventricular diastolic dysfunction consistent with impaired relaxation. Normal left atrial pressure. Moderate, global hypokinesis(see wall scoring diagram). Right VentricleNormal cavity size, wall thickness and ejection fraction. Wall motion n    Chronic pancreatitis 1/28/2019    Colon polyps     approx 5 yrs ago    Coronary artery disease due to calcified coronary lesion 05/08/2015    5 stents on ASA      Diabetic polyneuropathy associated with type 2 diabetes mellitus 9/2/2015    Diverticulosis 1/28/2019    DM type 2 with diabetic peripheral neuropathy 2/4/2019    Essential hypertension 1/28/2019    Former smoker 8/26/2015    Healed ulcer of left foot on examination 6/20/2017    Hydrocele     approx 1.5 yrs ago    Hypoalbuminemia 2/4/2019    Lymphedema of both lower extremities 1/29/2019    Mixed hyperlipidemia 5/8/2015    Morbid obesity with BMI of 50.0-59.9, adult 5/8/2015    Onychomycosis of multiple toenails with type 2 diabetes mellitus and peripheral neuropathy 6/20/2017    Perianal cyst     approx 2 yrs ago    Pseudocyst of pancreas 1/28/2019 1-28-19 Liver has a cirrhotic morphology with no focal lesions.  Significant interval increase in ascites when compared to prior exam which may account for patient's abdominal distension.  Hypodense air-fluid collection  along the body of the pancreas which is slightly smaller when compared to prior CT.  Findings may relate to pancreatic necrosis with pancreatic pseudocysts with infected pseudocyst    Skin cancer     skin cancer    Sleep apnea 8/26/2015    Status post bariatric surgery 1/11/2016    Type 2 diabetes mellitus, with long-term current use of insulin 5/8/2015     Past Surgical History:   Procedure Laterality Date    ANGIOGRAM, CORONARY ARTERY Right 3/20/2019    Performed by Bob [facility]que [facility] at [facility] CATH LAB    ANGIOPLASTY      total x5 stents    Bypass graft study  3/20/2019    Performed by Bob [facility]que MD at [facility] CATH LAB    COLONOSCOPY N/A 10/6/2015    Performed by Shekhar Richards MD at [facility] ENDO (2ND FLR)    CORONARY ARTERY BYPASS GRAFT  2017    x3    CYST REMOVAL      GASTRECTOMY      GASTRECTOMY-SLEEVE-LAPAROSCOPIC - 88119 N/A 12/22/2015    Performed by Micheal Villavicencio Jr., MD at [facility] OR 2ND FLR    KNEE ARTHROSCOPY      perianal surgery      perianal cyst removed       Home Meds:   Prior to Admission medications    Medication Sig Start Date End Date Taking? Authorizing Provider   amoxicillin-clavulanate 875-125mg (AUGMENTIN) 875-125 mg per tablet Take 1 tablet by mouth every 12 (twelve) hours. for 5 days 4/12/19 4/17/19  Rakan Claudio MD   apixaban (ELIQUIS) 5 mg Tab Take 1 tablet (5 mg total) by mouth 2 (two) times daily. 3/20/19   Lorie Higuera MD   atorvastatin (LIPITOR) 40 MG tablet Take 1 tablet (40 mg total) by mouth once daily. 2/4/19 2/4/20  Alfonso Mahmood MD   clopidogrel (PLAVIX) 75 mg tablet Take 1 tablet (75 mg total) by mouth once daily. 3/21/19 3/20/20  Lorie Higuera MD   cyanocobalamin, vitamin B-12, 500 mcg Subl Place 1 tablet under the tongue every Monday.     Historical Provider, MD   furosemide (LASIX) 40 MG tablet Take 1 tablet (40 mg total) by mouth once daily. 2/4/19 2/4/20  Alfonso Mahmood MD   insulin aspart U-100 (NOVOLOG) 100 unit/mL injection Inject 4  Units into the skin 3 (three) times daily before meals.    Historical Provider, MD   insulin detemir (LEVEMIR FLEXPEN SUBQ) Inject 12 Units into the skin once daily.     Historical Provider, MD   lipase-protease-amylase (CREON) 36,000-114,000- 180,000 unit CpDR Take 1 capsule by mouth 3 (three) times daily. 2/4/19   Alfonso Mahmood MD   metoprolol succinate (TOPROL-XL) 100 MG 24 hr tablet Take 1 tablet (100 mg total) by mouth once daily. 2/4/19 2/4/20  Alfonso Mahmood MD   omeprazole (PRILOSEC) 40 MG capsule Take 40 mg by mouth once daily. 2/18/19   Historical Provider, MD   ONETOUCH ULTRA TEST Strp 4 (four) times daily. Use to test blood sugar four times a day. 10/15/15   Historical Provider, MD   tamsulosin (FLOMAX) 0.4 mg Cap Take 1 capsule by mouth once daily. Pt has not started taking as of 2/27/19. 2/5/19   Historical Provider, MD   vitamin D (VITAMIN D3) 1000 units Tab Take 1 tablet (1,000 Units total) by mouth once daily. 1/31/19   Jian Lambert MD     Anticoagulants/Antiplatelets: Plavix and eliquis    Allergies: Review of patient's allergies indicates:  No Known Allergies  Sedation History:  no adverse reactions    Review of Systems:   Hematological: no known coagulopathies  Respiratory: no shortness of breath  Cardiovascular: no chest pain  Gastrointestinal: no abdominal pain  Genito-Urinary: no dysuria  Musculoskeletal: negative  Neurological: no TIA or stroke symptoms         OBJECTIVE:     Vital Signs (Most Recent)  Pulse: 81 (04/16/19 0900)  Resp: 18 (04/16/19 0900)  BP: (!) 108/57 (04/16/19 0900)  SpO2: 99 % (04/16/19 0900)    Physical Exam:  ASA: 2  Mallampati: n/a    General: no acute distress  Mental Status: alert and oriented to person, place and time  HEENT: normocephalic, atraumatic  Chest: unlabored breathing  Heart: regular heart rate  Abdomen: distended  Extremity: moves all extremities    Laboratory  Lab Results   Component Value Date    INR 1.3 (H) 03/27/2019       Lab Results   Component Value  Date    WBC 4.67 03/19/2019    HGB 9.0 (L) 03/19/2019    HCT 27.8 (L) 03/19/2019    MCV 88 03/19/2019     03/19/2019      Lab Results   Component Value Date     (H) 04/04/2019     (L) 04/04/2019    K 5.1 04/04/2019     04/04/2019    CO2 18 (L) 04/04/2019    BUN 48 (H) 04/04/2019    CREATININE 1.9 (H) 04/04/2019    CALCIUM 7.7 (L) 04/04/2019    MG 1.6 03/15/2019    ALT 17 04/04/2019    AST 19 04/04/2019    ALBUMIN 2.3 (L) 04/04/2019    BILITOT 0.4 04/04/2019    BILIDIR 0.2 07/10/2015       ASSESSMENT/PLAN:     Sedation Plan: local  Patient will undergo ultrasound guided paracentesis.    Reyna Oakley APRN, FNP  Interventional Radiology  [phone] spectralink

## 2019-04-16 NOTE — PROGRESS NOTES
Clinic Note  4/16/2019      Subjective:       Patient ID:  Jian is a 63 y.o. male being seen for an established visit.    Chief Complaint: Establish Care and Leg Swelling    63 years old male patient came to establish care and follow up his medical conditions:     Patient scratched by dog on 4/12 and devolved fever, went to urgent car, started on Augmentin and following up with wound care.    #Liver cirrhosis   presumably MARIE   CT abdomin and U/S abdomin consistent with liver cirrhosis  Biopsy sample was unrevealing   Patient following up with hepatology and undergoing paracentesis every Tuesday   Complaining of shortness of breath with walking for short distances, abdominal and lower extremities swelling.  No hx of hepatic encephalopathy, no current abdominal pain, he had paracentesis today.    # chronic pancreatitis  Patient denies history of heavy drinking   Currently on enzyme replacement therapy  Denies abdominal pain, nausea, vomiting and diarrhea.    # CAD s/p CABG    On Plavix, metoprolol and statin   Recently underwent LHC secondary to elevated troponin during hospitalization concerning or NSTEMI, no lesions were found.  Following up with cardiology    # Combined systolic/diatoilc heart failure   On lasix and metoprolol   Has lower extremities swelling and reported shortness of breath which most likely secondary to liver disease and chronic lymphedema    EF roughly 40% with normal right ventricular systolic function.    # Pafib   [id number]= 3  On metoprolol and eliquis   complained with his meds    # HLP  On atorvastatin    # DM-2 with neuropathy   On insulin   Last HGa1c = 7.3   Last time seen by opthalmology 4 years ago  Due for foot exam  measure his sugar at home usually run around ~200   No hyper/hypglycimic symptoms, very rarely  Had good appetite       # sleep apnea   Having difficulty sleeping and doesn't use CPAP daily, stated that he is not comfortable with it.    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         Review of Systems   Constitutional: Positive for malaise/fatigue. Negative for chills and fever.   HENT: Negative for congestion and sore throat.    Eyes: Negative for discharge and redness.   Respiratory: Positive for shortness of breath. Negative for cough.    Cardiovascular: Positive for leg swelling. Negative for chest pain.   Gastrointestinal: Negative for abdominal pain, heartburn and vomiting.   Genitourinary: Negative for dysuria and frequency.   Musculoskeletal: Negative for falls and neck pain.   Neurological: Negative for dizziness and headaches.       Past Medical History:   Diagnosis Date    Alcohol abuse     Anasarca 1/28/2019    Arthropathy associated with neurological disorder 9/2/2015    Atherosclerosis     Charcot foot due to diabetes mellitus     Chronic combined systolic and diastolic heart failure 01/29/2019 1-28-19 Left VentricleModerate decreased ejection fraction at 30%. Normal left ventricular cavity size. Normal wall thickness observed. Grade I (mild) left ventricular diastolic dysfunction consistent with impaired relaxation. Normal left atrial pressure. Moderate, global hypokinesis(see wall scoring diagram). Right VentricleNormal cavity size, wall thickness and ejection fraction. Wall motion n    Chronic pancreatitis 1/28/2019    Colon polyps     approx 5 yrs ago    Coronary artery disease due to calcified coronary lesion 05/08/2015    5 stents on ASA      Diabetic polyneuropathy associated with type 2 diabetes mellitus 9/2/2015    Diverticulosis 1/28/2019    DM type 2 with diabetic peripheral neuropathy 2/4/2019    Essential hypertension 1/28/2019    Former smoker 8/26/2015    Healed ulcer of left foot on examination 6/20/2017    Hydrocele     approx 1.5 yrs ago    Hypoalbuminemia 2/4/2019    Lymphedema of both lower extremities 1/29/2019    Mixed hyperlipidemia 5/8/2015    Morbid obesity  with BMI of 50.0-59.9, adult 5/8/2015    Onychomycosis of multiple toenails with type 2 diabetes mellitus and peripheral neuropathy 6/20/2017    Perianal cyst     approx 2 yrs ago    Pseudocyst of pancreas 1/28/2019 1-28-19 Liver has a cirrhotic morphology with no focal lesions.  Significant interval increase in ascites when compared to prior exam which may account for patient's abdominal distension.  Hypodense air-fluid collection along the body of the pancreas which is slightly smaller when compared to prior CT.  Findings may relate to pancreatic necrosis with pancreatic pseudocysts with infected pseudocyst    Skin cancer     skin cancer    Sleep apnea 8/26/2015    Status post bariatric surgery 1/11/2016    Type 2 diabetes mellitus, with long-term current use of insulin 5/8/2015       Family History   Problem Relation Age of Onset    Cancer Mother     Cancer Father     Heart disease Father     Obesity Sister     Parkinsonism Brother     No Known Problems Paternal Grandmother     Cancer Paternal Grandfather     Cancer Brother     Diabetes Maternal Grandmother     Stroke Maternal Grandfather     Cirrhosis Neg Hx         reports that he quit smoking about 14 years ago. His smoking use included cigarettes. He has a 60.00 pack-year smoking history. He has never used smokeless tobacco. He reports that he does not drink alcohol or use drugs.    Medication List with Changes/Refills   Current Medications    AMOXICILLIN-CLAVULANATE 875-125MG (AUGMENTIN) 875-125 MG PER TABLET    Take 1 tablet by mouth every 12 (twelve) hours. for 5 days    APIXABAN (ELIQUIS) 5 MG TAB    Take 1 tablet (5 mg total) by mouth 2 (two) times daily.    ATORVASTATIN (LIPITOR) 40 MG TABLET    Take 1 tablet (40 mg total) by mouth once daily.    CLOPIDOGREL (PLAVIX) 75 MG TABLET    Take 1 tablet (75 mg total) by mouth once daily.    CYANOCOBALAMIN, VITAMIN B-12, 500 MCG SUBL    Place 1 tablet under the tongue every Monday.      "FUROSEMIDE (LASIX) 40 MG TABLET    Take 1 tablet (40 mg total) by mouth once daily.    INSULIN ASPART U-100 (NOVOLOG) 100 UNIT/ML INJECTION    Inject 4 Units into the skin 3 (three) times daily before meals.    INSULIN DETEMIR (LEVEMIR FLEXPEN SUBQ)    Inject 12 Units into the skin once daily.     LIPASE-PROTEASE-AMYLASE (CREON) 36,000-114,000- 180,000 UNIT CPDR    Take 1 capsule by mouth 3 (three) times daily.    METOPROLOL SUCCINATE (TOPROL-XL) 100 MG 24 HR TABLET    Take 1 tablet (100 mg total) by mouth once daily.    OMEPRAZOLE (PRILOSEC) 40 MG CAPSULE    Take 40 mg by mouth once daily.    ONETOUCH ULTRA TEST STRP    4 (four) times daily. Use to test blood sugar four times a day.    TAMSULOSIN (FLOMAX) 0.4 MG CAP    Take 1 capsule by mouth once daily. Pt has not started taking as of 2/27/19.    VITAMIN D (VITAMIN D3) 1000 UNITS TAB    Take 1 tablet (1,000 Units total) by mouth once daily.     Review of patient's allergies indicates:  No Known Allergies    Patient Active Problem List   Diagnosis    Morbid obesity with BMI of 50.0-59.9, adult    Coronary artery disease due to calcified coronary lesion    Mixed hyperlipidemia    Sleep apnea    Diabetic polyneuropathy associated with type 2 diabetes mellitus    Status post bariatric surgery    Anasarca    Atherosclerosis    Chronic pancreatitis    Pseudocyst of pancreas    Essential hypertension    Chronic combined systolic and diastolic heart failure    Lymphedema of both lower extremities    DM type 2 with diabetic peripheral neuropathy    Hypoalbuminemia    Myocardiopathy    Ascites    ELIEZER (acute kidney injury)    Anemia    NSTEMI (non-ST elevated myocardial infarction)    Paroxysmal atrial fibrillation           Objective:      BP (!) 95/56   Pulse 93   Ht 5' 7" (1.702 m)   Wt 128.1 kg (282 lb 6.6 oz)   SpO2 99%   BMI 44.23 kg/m²   Estimated body mass index is 44.23 kg/m² as calculated from the following:    Height as of this encounter: " "5' 7" (1.702 m).    Weight as of this encounter: 128.1 kg (282 lb 6.6 oz).  Physical Exam   Constitutional: He is oriented to person, place, and time. No distress.   Obese    HENT:   Head: Normocephalic and atraumatic.   Eyes: No scleral icterus.   Neck: No JVD present.   Cardiovascular: Normal rate and regular rhythm.   No murmur heard.  Pulmonary/Chest: Effort normal and breath sounds normal. No respiratory distress. He has no wheezes.   Abdominal: Soft. Bowel sounds are normal. He exhibits distension. There is no tenderness.   Musculoskeletal: He exhibits edema (B/L LE +3 ). He exhibits no deformity.   Protective Sensation (w/ 10 gram monofilament):  Right: Absent  Left: Absent    Visual Inspection:  Normal -  Bilateral    Pedal Pulses:   Right: Diminshed  Left: Diminshed    Posterior tibialis:   Right:Diminshed  Left: Diminshed   Neurological: He is alert and oriented to person, place, and time.   Skin: He is not diaphoretic.   Left leg interior injury with dressing  Right leg interior redness (chronic) non tender    Psychiatric: Affect normal.   Vitals reviewed.        Assessment and Plan:         Jian was seen today for establish care and leg swelling.    Diagnoses and all orders for this visit:    DM type 2 with diabetic peripheral neuropathy  -     Ambulatory consult to Optometry    Coronary artery disease due to calcified coronary lesion  Mixed hyperlipidemia  Paroxysmal atrial fibrillation  Chronic pancreatitis, unspecified pancreatitis type  Obstructive sleep apnea syndrome  Lymphedema of both lower extremities  continue current mangment   Follow up with hepatology and cardiology   Patient offered therapist for emotional support for his disease but declined   Vaccine : PNA and shinglex     Follow up in about 6 weeks (around 5/28/2019).    Other Orders Placed This Visit:  Orders Placed This Encounter   Procedures    Ambulatory consult to Optometry   Troy faiaz  Internal Medicine, PGY 2  [phone]    "

## 2019-04-20 LAB — BACTERIA SPEC AEROBE CULT: NO GROWTH

## 2019-04-22 ENCOUNTER — TELEPHONE (OUTPATIENT)
Dept: INTERVENTIONAL RADIOLOGY/VASCULAR | Facility: HOSPITAL | Age: 64
End: 2019-04-22

## 2019-04-23 ENCOUNTER — HOSPITAL ENCOUNTER (OUTPATIENT)
Dept: INTERVENTIONAL RADIOLOGY/VASCULAR | Facility: HOSPITAL | Age: 64
Discharge: HOME | End: 2019-04-23
Attending: NURSE PRACTITIONER
Payer: COMMERCIAL

## 2019-04-23 VITALS
OXYGEN SATURATION: 100 % | HEART RATE: 80 BPM | SYSTOLIC BLOOD PRESSURE: 106 MMHG | RESPIRATION RATE: 18 BRPM | DIASTOLIC BLOOD PRESSURE: 67 MMHG

## 2019-04-23 DIAGNOSIS — R18.8 OTHER ASCITES: ICD-10-CM

## 2019-04-23 LAB
APPEARANCE FLD: NORMAL
BACTERIA SPEC ANAEROBE CULT: NORMAL
BASOPHILS NFR FLD MANUAL: 1 %
BODY FLD TYPE: NORMAL
COLOR FLD: YELLOW
LYMPHOCYTES NFR FLD MANUAL: 73 %
MESOTHL CELL NFR FLD MANUAL: 3 %
MONOS+MACROS NFR FLD MANUAL: 16 %
NEUTROPHILS NFR FLD MANUAL: 7 %
WBC # FLD: 102 /CU MM

## 2019-04-23 PROCEDURE — 63600175 PHARM REV CODE 636 W HCPCS: Mod: JG | Performed by: NURSE PRACTITIONER

## 2019-04-23 PROCEDURE — 49083 IR PARACENTESIS WITH IMAGING: ICD-10-PCS | Mod: ,,, | Performed by: FAMILY MEDICINE

## 2019-04-23 PROCEDURE — P9047 ALBUMIN (HUMAN), 25%, 50ML: HCPCS | Mod: JG | Performed by: NURSE PRACTITIONER

## 2019-04-23 PROCEDURE — 89051 BODY FLUID CELL COUNT: CPT

## 2019-04-23 PROCEDURE — 49083 ABD PARACENTESIS W/IMAGING: CPT | Mod: ,,, | Performed by: FAMILY MEDICINE

## 2019-04-23 PROCEDURE — 49083 ABD PARACENTESIS W/IMAGING: CPT

## 2019-04-23 RX ORDER — ALBUMIN HUMAN 250 G/1000ML
37.5 SOLUTION INTRAVENOUS ONCE
Status: COMPLETED | OUTPATIENT
Start: 2019-04-23 | End: 2019-04-23

## 2019-04-23 RX ADMIN — ALBUMIN (HUMAN) 37.5 G: 25 SOLUTION INTRAVENOUS at 01:04

## 2019-04-23 NOTE — H&P
Radiology History & Physical      SUBJECTIVE:     Chief Complaint: abdominal distention    History of Present Illness:  Jian Arrieta is a 63 y.o. male who presents for ultrasound guided paracentesis  Past Medical History:   Diagnosis Date    Alcohol abuse     Anasarca 1/28/2019    Arthropathy associated with neurological disorder 9/2/2015    Atherosclerosis     Charcot foot due to diabetes mellitus     Chronic combined systolic and diastolic heart failure 01/29/2019 1-28-19 Left VentricleModerate decreased ejection fraction at 30%. Normal left ventricular cavity size. Normal wall thickness observed. Grade I (mild) left ventricular diastolic dysfunction consistent with impaired relaxation. Normal left atrial pressure. Moderate, global hypokinesis(see wall scoring diagram). Right VentricleNormal cavity size, wall thickness and ejection fraction. Wall motion n    Chronic pancreatitis 1/28/2019    Colon polyps     approx 5 yrs ago    Coronary artery disease due to calcified coronary lesion 05/08/2015    5 stents on ASA      Diabetic polyneuropathy associated with type 2 diabetes mellitus 9/2/2015    Diverticulosis 1/28/2019    DM type 2 with diabetic peripheral neuropathy 2/4/2019    Essential hypertension 1/28/2019    Former smoker 8/26/2015    Healed ulcer of left foot on examination 6/20/2017    Hydrocele     approx 1.5 yrs ago    Hypoalbuminemia 2/4/2019    Lymphedema of both lower extremities 1/29/2019    Mixed hyperlipidemia 5/8/2015    Morbid obesity with BMI of 50.0-59.9, adult 5/8/2015    Onychomycosis of multiple toenails with type 2 diabetes mellitus and peripheral neuropathy 6/20/2017    Perianal cyst     approx 2 yrs ago    Pseudocyst of pancreas 1/28/2019 1-28-19 Liver has a cirrhotic morphology with no focal lesions.  Significant interval increase in ascites when compared to prior exam which may account for patient's abdominal distension.  Hypodense air-fluid collection  along the body of the pancreas which is slightly smaller when compared to prior CT.  Findings may relate to pancreatic necrosis with pancreatic pseudocysts with infected pseudocyst    Skin cancer     skin cancer    Sleep apnea 8/26/2015    Status post bariatric surgery 1/11/2016    Type 2 diabetes mellitus, with long-term current use of insulin 5/8/2015     Past Surgical History:   Procedure Laterality Date    ANGIOGRAM, CORONARY ARTERY Right 3/20/2019    Per[facility]que [facility] at [facility] C[facility] LAB    ANGIOPLASTY      total x5 stents    Bypass graft study  3/20/2019    Per[facility] at [facility] CATH LAB    COLONOSCOPY N/A 10/6/2015    Performed by Shekhar Richards MD at [facility] ENDO (2ND FLR)    CORONARY ARTERY BYPASS GRAFT  2017    x3    CYST REMOVAL      GASTRECTOMY      GASTRECTOMY-SLEEVE-LAPAROSCOPIC - 30860 N/A 12/22/2015    Performed by Micheal Villavicencio Jr., MD at [facility] OR 2ND FLR    KNEE ARTHROSCOPY      perianal surgery      perianal cyst removed       Home Meds:   Prior to Admission medications    Medication Sig Start Date End Date Taking? Authorizing Provider   apixaban (ELIQUIS) 5 mg Tab Take 1 tablet (5 mg total) by mouth 2 (two) times daily. 3/20/19   Lorie Higuera MD   atorvastatin (LIPITOR) 40 MG tablet Take 1 tablet (40 mg total) by mouth once daily. 2/4/19 2/4/20  Alfonso Mahmood MD   clopidogrel (PLAVIX) 75 mg tablet Take 1 tablet (75 mg total) by mouth once daily. 3/21/19 3/20/20  Lorie Higuera MD   cyanocobalamin, vitamin B-12, 500 mcg Subl Place 1 tablet under the tongue every Monday.     Historical Provider, MD   furosemide (LASIX) 40 MG tablet Take 1 tablet (40 mg total) by mouth once daily. 2/4/19 2/4/20  Alfonso Mahmood MD   insulin aspart U-100 (NOVOLOG) 100 unit/mL injection Inject 4 Units into the skin 3 (three) times daily before meals.    Historical Provider, MD   insulin detemir (LEVEMIR FLEXPEN SUBQ) Inject 12 Units into the skin once daily.      Historical Provider, MD   lipase-protease-amylase (CREON) 36,000-114,000- 180,000 unit CpDR Take 1 capsule by mouth 3 (three) times daily. 2/4/19   Alfonso Mahmood MD   metoprolol succinate (TOPROL-XL) 100 MG 24 hr tablet Take 1 tablet (100 mg total) by mouth once daily. 2/4/19 2/4/20  Alfonso Mahmood MD   omeprazole (PRILOSEC) 40 MG capsule Take 40 mg by mouth once daily. 2/18/19   Historical Provider, MD   ONETOUCH ULTRA TEST Strp 4 (four) times daily. Use to test blood sugar four times a day. 10/15/15   Historical Provider, MD   tamsulosin (FLOMAX) 0.4 mg Cap Take 1 capsule by mouth once daily. Pt has not started taking as of 2/27/19. 2/5/19   Historical Provider, MD   vitamin D (VITAMIN D3) 1000 units Tab Take 1 tablet (1,000 Units total) by mouth once daily. 1/31/19   Jian Lambert MD     Anticoagulants/Antiplatelets: Plavix and eliquis    Allergies: Review of patient's allergies indicates:  No Known Allergies  Sedation History:  no adverse reactions    Review of Systems:   Hematological: no known coagulopathies  Respiratory: no shortness of breath  Cardiovascular: no chest pain  Gastrointestinal: no abdominal pain  Genito-Urinary: no dysuria  Musculoskeletal: negative  Neurological: no TIA or stroke symptoms         OBJECTIVE:     Vital Signs (Most Recent)  Pulse: 80 (04/23/19 1133)  Resp: 18 (04/23/19 1133)  BP: 120/69 (04/23/19 1133)  SpO2: 100 % (04/23/19 1133)    Physical Exam:  ASA: 2  Mallampati: n/a    General: no acute distress  Mental Status: alert and oriented to person, place and time  HEENT: normocephalic, atraumatic  Chest: unlabored breathing  Heart: regular heart rate  Abdomen: distended  Extremity: moves all extremities    Laboratory  Lab Results   Component Value Date    INR 1.3 (H) 03/27/2019       Lab Results   Component Value Date    WBC 4.67 03/19/2019    HGB 9.0 (L) 03/19/2019    HCT 27.8 (L) 03/19/2019    MCV 88 03/19/2019     03/19/2019      Lab Results   Component Value Date    GLU  220 (H) 04/04/2019     (L) 04/04/2019    K 5.1 04/04/2019     04/04/2019    CO2 18 (L) 04/04/2019    BUN 48 (H) 04/04/2019    CREATININE 1.9 (H) 04/04/2019    CALCIUM 7.7 (L) 04/04/2019    MG 1.6 03/15/2019    ALT 17 04/04/2019    AST 19 04/04/2019    ALBUMIN 2.3 (L) 04/04/2019    BILITOT 0.4 04/04/2019    BILIDIR 0.2 07/10/2015       ASSESSMENT/PLAN:     Sedation Plan: local  Patient will undergo ultrasound guided paracentesis.    Reyna Oakley, APRN, FNP  Interventional Radiology  [phone] [employer]

## 2019-04-23 NOTE — PROGRESS NOTES
Paracentesis complete.  6 Ls peritoneal fluid drained. Pt tolerated well. Albumin 150 mL required per protocol.  Discharge instructions and handouts provided. Pt verbalized understanding.

## 2019-04-23 NOTE — PROCEDURES
Radiology Post-Procedure Note    Pre Op Diagnosis: Ascites  Post Op Diagnosis: Same    Procedure: Ultrasound Guided Paracentesis    Procedure performed by: Cele GARCIA, Reyna     Written Informed Consent Obtained: Yes  Specimen Removed: YES clear yellow   Estimated Blood Loss: Minimal    Findings:   Successful paracentesis.  Albumin administered PRN per protocol.    Patient tolerated procedure well.    Reyna Oakley, APRN, FNP  Interventional Radiology  [phone] [employer]

## 2019-04-23 NOTE — DISCHARGE INSTRUCTIONS
"For scheduling: Call Isabelle at [phone]    For questions or concerns call: KESHAV MON-FRI 8 AM- 5PM: [phone].   **After hours and weekends: Call [phone] and ask for "Radiology Resident on call".    For immediate concerns that are not emergent, you may call our radiology clinic at: [phone]    "

## 2019-04-25 ENCOUNTER — OFFICE VISIT (OUTPATIENT)
Dept: GASTROENTEROLOGY | Facility: CLINIC | Age: 64
End: 2019-04-25
Payer: COMMERCIAL

## 2019-04-25 DIAGNOSIS — I42.9 CARDIOMYOPATHY, UNSPECIFIED TYPE: Primary | ICD-10-CM

## 2019-04-25 DIAGNOSIS — K86.3 PSEUDOCYST OF PANCREAS: ICD-10-CM

## 2019-04-25 DIAGNOSIS — E66.01 MORBID OBESITY WITH BMI OF 50.0-59.9, ADULT: ICD-10-CM

## 2019-04-25 DIAGNOSIS — R18.8 OTHER ASCITES: ICD-10-CM

## 2019-04-25 PROCEDURE — 99215 PR OFFICE/OUTPT VISIT, EST, LEVL V, 40-54 MIN: ICD-10-PCS | Mod: S$GLB,,, | Performed by: INTERNAL MEDICINE

## 2019-04-25 PROCEDURE — 99999 PR PBB SHADOW E&M-EST. PATIENT-LVL III: CPT | Mod: PBBFAC,,,

## 2019-04-25 PROCEDURE — 99999 PR PBB SHADOW E&M-EST. PATIENT-LVL III: ICD-10-PCS | Mod: PBBFAC,,,

## 2019-04-25 PROCEDURE — 99215 OFFICE O/P EST HI 40 MIN: CPT | Mod: S$GLB,,, | Performed by: INTERNAL MEDICINE

## 2019-04-25 NOTE — PROGRESS NOTES
Subjective:       Patient ID: Jian Arrieta is a 63 y.o. male.    Chief Complaint: Pancreatitis    Mr Arrieta is here for follow up of his pancreatic necrosis.  I last saw him in January and our service  saw him in the hospital this spring.  He had a pancreatic fluid collection with air that had appeared stable over time but was not significantly symptomatic. Since that last visit, he has developed recurrent ascites, the etiology of  which has been elusive.    He has cirrhosis morphology on imaging but a nondiagnostic liver biopsy.  His EF is low but appears not to have significant enough right hear failure to cause ascites and gross anasarca.  He had one ascites amylase that was normal.    Review of Systems   Constitutional: Positive for activity change, appetite change and fatigue. Negative for fever.   HENT: Negative.    Eyes: Negative.    Respiratory: Positive for shortness of breath.    Cardiovascular: Positive for leg swelling.   Gastrointestinal: Positive for abdominal distention. Negative for abdominal pain and vomiting.   Endocrine: Negative.    Genitourinary: Negative.    Musculoskeletal: Positive for gait problem.   Skin: Positive for wound.   Allergic/Immunologic: Negative.    Hematological: Negative.    Psychiatric/Behavioral: Negative.        Objective:      Physical Exam   Constitutional: He is oriented to person, place, and time. He appears ill.   HENT:   Head: Atraumatic.   Eyes: No scleral icterus.   Neck: Normal range of motion.   Cardiovascular: Regular rhythm.   Pulmonary/Chest: Effort normal.   Abdominal: He exhibits distension. He exhibits no mass. There is no tenderness. There is no rebound and no guarding.   Musculoskeletal: He exhibits edema. Tenderness: significant bilateral edema with light erythema on the right leg, left leg with a bandage.   Neurological: He is alert and oriented to person, place, and time.   Psychiatric: He has a normal mood and affect.       Assessment:       1.  Cardiomyopathy, unspecified type    2. Morbid obesity with BMI of 50.0-59.9, adult    3. Pseudocyst of pancreas    4. Other ascites        Plan:       The etiology of his ascites is unclear thus far. I would like to have the ascites fluid rechecked for amylase ans have reached out to the hepatology service to have this repeated at his next paracentesis.    He and his brother are understerstandably frustrated with no having a diagnosis yet.  I spoke to both the patient and his brother about all the ongoing efforts in illuminating his problems.    For now we have nothing to offer unless the ascites fluid has amylase at which point we would consider an ERP for presumed pancreatic duct leak.      I spent 45 minutes with the patient., over 50% of it in coordinating care and counseling the patient and his brother

## 2019-04-26 ENCOUNTER — TELEPHONE (OUTPATIENT)
Dept: HEPATOLOGY | Facility: CLINIC | Age: 64
End: 2019-04-26

## 2019-04-26 DIAGNOSIS — R18.8 OTHER ASCITES: Primary | ICD-10-CM

## 2019-04-26 NOTE — TELEPHONE ENCOUNTER
Order placed for amylase fluid analysis to be sent with upcoming para per Dr. Hall recommendation    Thanks

## 2019-04-29 ENCOUNTER — HOSPITAL ENCOUNTER (OUTPATIENT)
Dept: INTERVENTIONAL RADIOLOGY/VASCULAR | Facility: HOSPITAL | Age: 64
Discharge: HOME | End: 2019-04-29
Attending: NURSE PRACTITIONER
Payer: MEDICARE

## 2019-04-29 ENCOUNTER — TELEPHONE (OUTPATIENT)
Dept: HEPATOLOGY | Facility: CLINIC | Age: 64
End: 2019-04-29

## 2019-04-29 VITALS
HEART RATE: 83 BPM | DIASTOLIC BLOOD PRESSURE: 66 MMHG | OXYGEN SATURATION: 98 % | RESPIRATION RATE: 18 BRPM | SYSTOLIC BLOOD PRESSURE: 110 MMHG

## 2019-04-29 DIAGNOSIS — R18.8 OTHER ASCITES: ICD-10-CM

## 2019-04-29 LAB
AMYLASE, BODY FLUID: 34 U/L
APPEARANCE FLD: NORMAL
BODY FLD TYPE: NORMAL
BODY FLUID SOURCE AMYLASE: NORMAL
COLOR FLD: YELLOW
LYMPHOCYTES NFR FLD MANUAL: 20 %
MESOTHL CELL NFR FLD MANUAL: 3 %
MONOS+MACROS NFR FLD MANUAL: 72 %
NEUTROPHILS NFR FLD MANUAL: 5 %
WBC # FLD: 188 /CU MM

## 2019-04-29 PROCEDURE — 49083 ABD PARACENTESIS W/IMAGING: CPT | Mod: ,,, | Performed by: FAMILY MEDICINE

## 2019-04-29 PROCEDURE — 49083 ABD PARACENTESIS W/IMAGING: CPT

## 2019-04-29 PROCEDURE — 89051 BODY FLUID CELL COUNT: CPT

## 2019-04-29 PROCEDURE — 82150 ASSAY OF AMYLASE: CPT

## 2019-04-29 PROCEDURE — 49083 IR PARACENTESIS WITH IMAGING: ICD-10-PCS | Mod: ,,, | Performed by: FAMILY MEDICINE

## 2019-04-29 NOTE — PROCEDURES
Radiology Post-Procedure Note    Pre Op Diagnosis: Ascites  Post Op Diagnosis: Same    Procedure: Ultrasound Guided Paracentesis    Procedure performed by: Cele GARCIA, Reyna     Written Informed Consent Obtained: Yes  Specimen Removed: YES cloudy yellow  Estimated Blood Loss: Minimal    Findings:   Successful paracentesis.  Albumin administered PRN per protocol.    Patient tolerated procedure well.    Reyna Oakley, APRN, FNP  Interventional Radiology  [phone] [employer]

## 2019-04-29 NOTE — PROGRESS NOTES
Unable to obtain an IV access after several attempts by several RN's. Per Dr. Torres proceed with paracentesis till 5L then stop due to inability to access an IV.

## 2019-04-29 NOTE — PROGRESS NOTES
Pt arrived to US room 1 for para, no acute distress noted. Orders and labs reviewed on chart. Awaiting consent.

## 2019-04-29 NOTE — PROGRESS NOTES
Para completed, pt tolerated well. No apparent distress noted. 4.9 Liters removed from right abd, mepore applied CDI. Labs collected and sent. Albumin not given unable to obtain IV access.  Discharge instructions reviewed and acknowledged. Pt discharged via wheelchair and private vehicle.

## 2019-04-29 NOTE — H&P
Radiology History & Physical      SUBJECTIVE:     Chief Complaint: abdominal distention    History of Present Illness:  Jian Arrieta is a 63 y.o. male who presents for ultrasound guided paracentesis  Past Medical History:   Diagnosis Date    Alcohol abuse     Anasarca 1/28/2019    Arthropathy associated with neurological disorder 9/2/2015    Atherosclerosis     Charcot foot due to diabetes mellitus     Chronic combined systolic and diastolic heart failure 01/29/2019 1-28-19 Left VentricleModerate decreased ejection fraction at 30%. Normal left ventricular cavity size. Normal wall thickness observed. Grade I (mild) left ventricular diastolic dysfunction consistent with impaired relaxation. Normal left atrial pressure. Moderate, global hypokinesis(see wall scoring diagram). Right VentricleNormal cavity size, wall thickness and ejection fraction. Wall motion n    Chronic pancreatitis 1/28/2019    Colon polyps     approx 5 yrs ago    Coronary artery disease due to calcified coronary lesion 05/08/2015    5 stents on ASA      Diabetic polyneuropathy associated with type 2 diabetes mellitus 9/2/2015    Diverticulosis 1/28/2019    DM type 2 with diabetic peripheral neuropathy 2/4/2019    Essential hypertension 1/28/2019    Former smoker 8/26/2015    Healed ulcer of left foot on examination 6/20/2017    Hydrocele     approx 1.5 yrs ago    Hypoalbuminemia 2/4/2019    Lymphedema of both lower extremities 1/29/2019    Mixed hyperlipidemia 5/8/2015    Morbid obesity with BMI of 50.0-59.9, adult 5/8/2015    Onychomycosis of multiple toenails with type 2 diabetes mellitus and peripheral neuropathy 6/20/2017    Perianal cyst     approx 2 yrs ago    Pseudocyst of pancreas 1/28/2019 1-28-19 Liver has a cirrhotic morphology with no focal lesions.  Significant interval increase in ascites when compared to prior exam which may account for patient's abdominal distension.  Hypodense air-fluid collection  along the body of the pancreas which is slightly smaller when compared to prior CT.  Findings may relate to pancreatic necrosis with pancreatic pseudocysts with infected pseudocyst    Skin cancer     skin cancer    Sleep apnea 8/26/2015    Status post bariatric surgery 1/11/2016    Type 2 diabetes mellitus, with long-term current use of insulin 5/8/2015     Past Surgical History:   Procedure Laterality Date    ANGIOGRAM, CORONARY ARTERY Right 3/20/2019    [facility]formed [facility] CATH LAB    ANGIOPLASTY      total x5 stents    Bypass graft study  3/20/2019    Performed by [facility] at [facility] CATH LAB    COLONOSCOPY N/A 10/6/2015    Performed by Shekhar Richards MD at [facility] ENDO (2ND FLR)    CORONARY ARTERY BYPASS GRAFT  2017    x3    CYST REMOVAL      GASTRECTOMY      GASTRECTOMY-SLEEVE-LAPAROSCOPIC - 70459 N/A 12/22/2015    Performed by Micheal Villavicencio Jr., MD at [facility] OR 2ND FLR    KNEE ARTHROSCOPY      perianal surgery      perianal cyst removed       Home Meds:   Prior to Admission medications    Medication Sig Start Date End Date Taking? Authorizing Provider   apixaban (ELIQUIS) 5 mg Tab Take 1 tablet (5 mg total) by mouth 2 (two) times daily. 3/20/19   Lorie Higuera MD   atorvastatin (LIPITOR) 40 MG tablet Take 1 tablet (40 mg total) by mouth once daily. 2/4/19 2/4/20  Alfonso Mahmood MD   clopidogrel (PLAVIX) 75 mg tablet Take 1 tablet (75 mg total) by mouth once daily. 3/21/19 3/20/20  Lorie Higuera MD   cyanocobalamin, vitamin B-12, 500 mcg Subl Place 1 tablet under the tongue every Monday.     Historical Provider, MD   furosemide (LASIX) 40 MG tablet Take 1 tablet (40 mg total) by mouth once daily. 2/4/19 2/4/20  Alfonso Mahmood MD   insulin aspart U-100 (NOVOLOG) 100 unit/mL injection Inject 4 Units into the skin 3 (three) times daily before meals.    Historical Provider, MD   insulin detemir (LEVEMIR FLEXPEN SUBQ) Inject 12 Units into the skin once daily.      Historical Provider, MD   lipase-protease-amylase (CREON) 36,000-114,000- 180,000 unit CpDR Take 1 capsule by mouth 3 (three) times daily. 2/4/19   Alfonso Mahmood MD   metoprolol succinate (TOPROL-XL) 100 MG 24 hr tablet Take 1 tablet (100 mg total) by mouth once daily. 2/4/19 2/4/20  Alfonso Mahmood MD   omeprazole (PRILOSEC) 40 MG capsule Take 40 mg by mouth once daily. 2/18/19   Historical Provider, MD   ONETOUCH ULTRA TEST Strp 4 (four) times daily. Use to test blood sugar four times a day. 10/15/15   Historical Provider, MD   tamsulosin (FLOMAX) 0.4 mg Cap Take 1 capsule by mouth once daily. Pt has not started taking as of 2/27/19. 2/5/19   Historical Provider, MD   vitamin D (VITAMIN D3) 1000 units Tab Take 1 tablet (1,000 Units total) by mouth once daily. 1/31/19   Jian Lambert MD     Anticoagulants/Antiplatelets: Plavix and eliquis    Allergies: Review of patient's allergies indicates:  No Known Allergies  Sedation History:  no adverse reactions    Review of Systems:   Hematological: no known coagulopathies  Respiratory: no shortness of breath  Cardiovascular: no chest pain  Gastrointestinal: no abdominal pain  Genito-Urinary: no dysuria  Musculoskeletal: negative  Neurological: no TIA or stroke symptoms         OBJECTIVE:     Vital Signs (Most Recent)       Physical Exam:  ASA: 2  Mallampati: n/a    General: no acute distress  Mental Status: alert and oriented to person, place and time  HEENT: normocephalic, atraumatic  Chest: unlabored breathing  Heart: regular heart rate  Abdomen: distended  Extremity: moves all extremities    Laboratory  Lab Results   Component Value Date    INR 1.3 (H) 03/27/2019       Lab Results   Component Value Date    WBC 4.67 03/19/2019    HGB 9.0 (L) 03/19/2019    HCT 27.8 (L) 03/19/2019    MCV 88 03/19/2019     03/19/2019      Lab Results   Component Value Date     (H) 04/04/2019     (L) 04/04/2019    K 5.1 04/04/2019     04/04/2019    CO2 18 (L)  04/04/2019    BUN 48 (H) 04/04/2019    CREATININE 1.9 (H) 04/04/2019    CALCIUM 7.7 (L) 04/04/2019    MG 1.6 03/15/2019    ALT 17 04/04/2019    AST 19 04/04/2019    ALBUMIN 2.3 (L) 04/04/2019    BILITOT 0.4 04/04/2019    BILIDIR 0.2 07/10/2015       ASSESSMENT/PLAN:     Sedation Plan: local  Patient will undergo ultrasound guided paracentesis.    ANDREW Valerio, FNP  Interventional Radiology  [phone] [employer]

## 2019-04-29 NOTE — DISCHARGE INSTRUCTIONS
[facility] [phone] (MON-FRI 8 AM- 5PM). Radiology Resident on call [phone].

## 2019-05-02 ENCOUNTER — HOSPITAL ENCOUNTER (OUTPATIENT)
Dept: INTERVENTIONAL RADIOLOGY/VASCULAR | Facility: HOSPITAL | Age: 64
Discharge: HOME | End: 2019-05-02
Attending: NURSE PRACTITIONER
Payer: COMMERCIAL

## 2019-05-02 VITALS — OXYGEN SATURATION: 100 % | SYSTOLIC BLOOD PRESSURE: 104 MMHG | DIASTOLIC BLOOD PRESSURE: 72 MMHG | HEART RATE: 81 BPM

## 2019-05-02 DIAGNOSIS — R18.8 OTHER ASCITES: ICD-10-CM

## 2019-05-02 DIAGNOSIS — R18.8 OTHER ASCITES: Primary | ICD-10-CM

## 2019-05-02 LAB
APPEARANCE FLD: CLEAR
BODY FLD TYPE: NORMAL
COLOR FLD: YELLOW
LYMPHOCYTES NFR FLD MANUAL: 85 %
MESOTHL CELL NFR FLD MANUAL: 2 %
MONOS+MACROS NFR FLD MANUAL: 12 %
NEUTROPHILS NFR FLD MANUAL: 1 %
WBC # FLD: 138 /CU MM

## 2019-05-02 PROCEDURE — 89051 BODY FLUID CELL COUNT: CPT

## 2019-05-02 PROCEDURE — 49083 ABD PARACENTESIS W/IMAGING: CPT | Mod: ,,, | Performed by: NURSE PRACTITIONER

## 2019-05-02 PROCEDURE — 49083 IR PARACENTESIS WITH IMAGING: ICD-10-PCS | Mod: ,,, | Performed by: NURSE PRACTITIONER

## 2019-05-02 PROCEDURE — 49083 ABD PARACENTESIS W/IMAGING: CPT

## 2019-05-02 RX ORDER — ALBUMIN HUMAN 250 G/1000ML
37.5 SOLUTION INTRAVENOUS ONCE
Status: DISCONTINUED | OUTPATIENT
Start: 2019-05-02 | End: 2019-05-03 | Stop reason: HOSPADM

## 2019-05-02 NOTE — H&P
Radiology History & Physical      SUBJECTIVE:     Chief Complaint: abdominal distention    History of Present Illness:  Jian Arrieta is a 63 y.o. male who presents for evaluation of ascites  Past Medical History:   Diagnosis Date    Alcohol abuse     Anasarca 1/28/2019    Arthropathy associated with neurological disorder 9/2/2015    Atherosclerosis     Charcot foot due to diabetes mellitus     Chronic combined systolic and diastolic heart failure 01/29/2019 1-28-19 Left VentricleModerate decreased ejection fraction at 30%. Normal left ventricular cavity size. Normal wall thickness observed. Grade I (mild) left ventricular diastolic dysfunction consistent with impaired relaxation. Normal left atrial pressure. Moderate, global hypokinesis(see wall scoring diagram). Right VentricleNormal cavity size, wall thickness and ejection fraction. Wall motion n    Chronic pancreatitis 1/28/2019    Colon polyps     approx 5 yrs ago    Coronary artery disease due to calcified coronary lesion 05/08/2015    5 stents on ASA      Diabetic polyneuropathy associated with type 2 diabetes mellitus 9/2/2015    Diverticulosis 1/28/2019    DM type 2 with diabetic peripheral neuropathy 2/4/2019    Essential hypertension 1/28/2019    Former smoker 8/26/2015    Healed ulcer of left foot on examination 6/20/2017    Hydrocele     approx 1.5 yrs ago    Hypoalbuminemia 2/4/2019    Lymphedema of both lower extremities 1/29/2019    Mixed hyperlipidemia 5/8/2015    Morbid obesity with BMI of 50.0-59.9, adult 5/8/2015    Onychomycosis of multiple toenails with type 2 diabetes mellitus and peripheral neuropathy 6/20/2017    Perianal cyst     approx 2 yrs ago    Pseudocyst of pancreas 1/28/2019 1-28-19 Liver has a cirrhotic morphology with no focal lesions.  Significant interval increase in ascites when compared to prior exam which may account for patient's abdominal distension.  Hypodense air-fluid collection along the  body of the pancreas which is slightly smaller when compared to prior CT.  Findings may relate to pancreatic necrosis with pancreatic pseudocysts with infected pseudocyst    Skin cancer     skin cancer    Sleep apnea 8/26/2015    Status post bariatric surgery 1/11/2016    Type 2 diabetes mellitus, with long-term current use of insulin 5/8/2015     Past Surgical History:   Procedure Laterality Date    ANGIOGRAM, CORONARY ARTERY Right 3/20/2019    Performed by [facility] at [facility] CATH LAB    ANGIOPLASTY      total x5 stents    Bypass graft study  3/20/2019    Performed by [facility] at [facility] CATH LAB    COLONOSCOPY N/A 10/6/2015    Performed by Shekhar Richards MD at [facility] ENDO (2ND FLR)    CORONARY ARTERY BYPASS GRAFT  2017    x3    CYST REMOVAL      GASTRECTOMY      GASTRECTOMY-SLEEVE-LAPAROSCOPIC - 16607 N/A 12/22/2015    Performed by Micheal Villavicencio Jr., MD at [facility] OR 2ND FLR    KNEE ARTHROSCOPY      perianal surgery      perianal cyst removed       Home Meds:   Prior to Admission medications    Medication Sig Start Date End Date Taking? Authorizing Provider   apixaban (ELIQUIS) 5 mg Tab Take 1 tablet (5 mg total) by mouth 2 (two) times daily. 3/20/19   Lorie Higuera MD   atorvastatin (LIPITOR) 40 MG tablet Take 1 tablet (40 mg total) by mouth once daily. 2/4/19 2/4/20  Alfonso Mahmood MD   clopidogrel (PLAVIX) 75 mg tablet Take 1 tablet (75 mg total) by mouth once daily. 3/21/19 3/20/20  Lorie Higuera MD   cyanocobalamin, vitamin B-12, 500 mcg Subl Place 1 tablet under the tongue every Monday.     Historical Provider, MD   furosemide (LASIX) 40 MG tablet Take 1 tablet (40 mg total) by mouth once daily. 2/4/19 2/4/20  Alfonso Mahmood MD   insulin aspart U-100 (NOVOLOG) 100 unit/mL injection Inject 4 Units into the skin 3 (three) times daily before meals.    Historical Provider, MD   insulin detemir (LEVEMIR FLEXPEN SUBQ) Inject 12 Units into the skin once daily.     Historical  Provider, MD   lipase-protease-amylase (CREON) 36,000-114,000- 180,000 unit CpDR Take 1 capsule by mouth 3 (three) times daily. 2/4/19   Alfonso Mahmood MD   metoprolol succinate (TOPROL-XL) 100 MG 24 hr tablet Take 1 tablet (100 mg total) by mouth once daily. 2/4/19 2/4/20  Alfonso Mahmood MD   omeprazole (PRILOSEC) 40 MG capsule Take 40 mg by mouth once daily. 2/18/19   Historical Provider, MD   ONETOUCH ULTRA TEST Strp 4 (four) times daily. Use to test blood sugar four times a day. 10/15/15   Historical Provider, MD   tamsulosin (FLOMAX) 0.4 mg Cap Take 1 capsule by mouth once daily. Pt has not started taking as of 2/27/19. 2/5/19   Historical Provider, MD   vitamin D (VITAMIN D3) 1000 units Tab Take 1 tablet (1,000 Units total) by mouth once daily. 1/31/19   Jian Lambert MD     Anticoagulants/Antiplatelets: Plavix and Apixiban    Allergies: Review of patient's allergies indicates:  No Known Allergies  Sedation History:  no adverse reactions    Review of Systems:   Hematological: no known coagulopathies  Respiratory: no shortness of breath  Cardiovascular: no chest pain  Gastrointestinal: no abdominal pain  Genito-Urinary: no dysuria  Musculoskeletal: negative  Neurological: no TIA or stroke symptoms         OBJECTIVE:     Vital Signs (Most Recent)       Physical Exam:  ASA: 3  Mallampati: NA    General: no acute distress  Mental Status: alert and oriented to person, place and time  HEENT: normocephalic, atraumatic  Chest: unlabored breathing  Heart: regular heart rate  Abdomen: distended  Extremity: moves all extremities    Laboratory  Lab Results   Component Value Date    INR 1.3 (H) 03/27/2019       Lab Results   Component Value Date    WBC 4.67 03/19/2019    HGB 9.0 (L) 03/19/2019    HCT 27.8 (L) 03/19/2019    MCV 88 03/19/2019     03/19/2019      Lab Results   Component Value Date     (H) 04/04/2019     (L) 04/04/2019    K 5.1 04/04/2019     04/04/2019    CO2 18 (L) 04/04/2019    BUN 48  (H) 04/04/2019    CREATININE 1.9 (H) 04/04/2019    CALCIUM 7.7 (L) 04/04/2019    MG 1.6 03/15/2019    ALT 17 04/04/2019    AST 19 04/04/2019    ALBUMIN 2.3 (L) 04/04/2019    BILITOT 0.4 04/04/2019    BILIDIR 0.2 07/10/2015       ASSESSMENT/PLAN:     Sedation Plan: local anesthesia  Patient will undergo US guided paracentesis

## 2019-05-02 NOTE — PROGRESS NOTES
Paracentesis complete. 3100 mLs peritoneal fluid drained. Pt tolerated well. Dressing to  Left abd clean, dry, and intact. . Specimens sent per lab order.  Pt discharged

## 2019-05-03 ENCOUNTER — OFFICE VISIT (OUTPATIENT)
Dept: INTERNAL MEDICINE | Facility: CLINIC | Age: 64
End: 2019-05-03
Payer: COMMERCIAL

## 2019-05-03 ENCOUNTER — TELEPHONE (OUTPATIENT)
Dept: HEPATOLOGY | Facility: CLINIC | Age: 64
End: 2019-05-03

## 2019-05-03 VITALS
RESPIRATION RATE: 16 BRPM | BODY MASS INDEX: 44.29 KG/M2 | SYSTOLIC BLOOD PRESSURE: 100 MMHG | TEMPERATURE: 98 F | HEART RATE: 56 BPM | DIASTOLIC BLOOD PRESSURE: 60 MMHG | HEIGHT: 67 IN | WEIGHT: 282.19 LBS

## 2019-05-03 DIAGNOSIS — I15.2 HYPERTENSION ASSOCIATED WITH DIABETES: ICD-10-CM

## 2019-05-03 DIAGNOSIS — I70.90 ATHEROSCLEROSIS: ICD-10-CM

## 2019-05-03 DIAGNOSIS — E11.59 HYPERTENSION ASSOCIATED WITH DIABETES: ICD-10-CM

## 2019-05-03 DIAGNOSIS — E11.69 HYPERLIPIDEMIA ASSOCIATED WITH TYPE 2 DIABETES MELLITUS: ICD-10-CM

## 2019-05-03 DIAGNOSIS — E66.01 MORBID OBESITY WITH BMI OF 50.0-59.9, ADULT: ICD-10-CM

## 2019-05-03 DIAGNOSIS — I89.0 LYMPHEDEMA OF BOTH LOWER EXTREMITIES: ICD-10-CM

## 2019-05-03 DIAGNOSIS — I48.0 PAROXYSMAL ATRIAL FIBRILLATION: ICD-10-CM

## 2019-05-03 DIAGNOSIS — E11.42 DM TYPE 2 WITH DIABETIC PERIPHERAL NEUROPATHY: ICD-10-CM

## 2019-05-03 DIAGNOSIS — I25.84 CORONARY ARTERY DISEASE DUE TO CALCIFIED CORONARY LESION: ICD-10-CM

## 2019-05-03 DIAGNOSIS — E78.5 HYPERLIPIDEMIA ASSOCIATED WITH TYPE 2 DIABETES MELLITUS: ICD-10-CM

## 2019-05-03 DIAGNOSIS — E66.2 OBESITY HYPOVENTILATION SYNDROME: ICD-10-CM

## 2019-05-03 DIAGNOSIS — I50.42 CHRONIC COMBINED SYSTOLIC AND DIASTOLIC HEART FAILURE: Primary | ICD-10-CM

## 2019-05-03 DIAGNOSIS — K86.1 CHRONIC PANCREATITIS, UNSPECIFIED PANCREATITIS TYPE: ICD-10-CM

## 2019-05-03 DIAGNOSIS — K74.60 HEPATIC CIRRHOSIS, UNSPECIFIED HEPATIC CIRRHOSIS TYPE, UNSPECIFIED WHETHER ASCITES PRESENT: ICD-10-CM

## 2019-05-03 DIAGNOSIS — I25.10 CORONARY ARTERY DISEASE DUE TO CALCIFIED CORONARY LESION: ICD-10-CM

## 2019-05-03 PROBLEM — N17.9 AKI (ACUTE KIDNEY INJURY): Status: RESOLVED | Noted: 2019-03-13 | Resolved: 2019-05-03

## 2019-05-03 PROCEDURE — 99214 OFFICE O/P EST MOD 30 MIN: CPT | Mod: S$GLB,,, | Performed by: INTERNAL MEDICINE

## 2019-05-03 PROCEDURE — 3045F PR MOST RECENT HEMOGLOBIN A1C LEVEL 7.0-9.0%: ICD-10-PCS | Mod: CPTII,S$GLB,, | Performed by: INTERNAL MEDICINE

## 2019-05-03 PROCEDURE — 3074F SYST BP LT 130 MM HG: CPT | Mod: CPTII,S$GLB,, | Performed by: INTERNAL MEDICINE

## 2019-05-03 PROCEDURE — 3008F BODY MASS INDEX DOCD: CPT | Mod: CPTII,S$GLB,, | Performed by: INTERNAL MEDICINE

## 2019-05-03 PROCEDURE — 3045F PR MOST RECENT HEMOGLOBIN A1C LEVEL 7.0-9.0%: CPT | Mod: CPTII,S$GLB,, | Performed by: INTERNAL MEDICINE

## 2019-05-03 PROCEDURE — 99214 PR OFFICE/OUTPT VISIT, EST, LEVL IV, 30-39 MIN: ICD-10-PCS | Mod: S$GLB,,, | Performed by: INTERNAL MEDICINE

## 2019-05-03 PROCEDURE — 99999 PR PBB SHADOW E&M-EST. PATIENT-LVL III: ICD-10-PCS | Mod: PBBFAC,,, | Performed by: INTERNAL MEDICINE

## 2019-05-03 PROCEDURE — 3078F DIAST BP <80 MM HG: CPT | Mod: CPTII,S$GLB,, | Performed by: INTERNAL MEDICINE

## 2019-05-03 PROCEDURE — 99499 UNLISTED E&M SERVICE: CPT | Mod: S$GLB,,, | Performed by: INTERNAL MEDICINE

## 2019-05-03 PROCEDURE — 3074F PR MOST RECENT SYSTOLIC BLOOD PRESSURE < 130 MM HG: ICD-10-PCS | Mod: CPTII,S$GLB,, | Performed by: INTERNAL MEDICINE

## 2019-05-03 PROCEDURE — 99499 RISK ADDL DX/OHS AUDIT: ICD-10-PCS | Mod: S$GLB,,, | Performed by: INTERNAL MEDICINE

## 2019-05-03 PROCEDURE — 99999 PR PBB SHADOW E&M-EST. PATIENT-LVL III: CPT | Mod: PBBFAC,,, | Performed by: INTERNAL MEDICINE

## 2019-05-03 PROCEDURE — 3008F PR BODY MASS INDEX (BMI) DOCUMENTED: ICD-10-PCS | Mod: CPTII,S$GLB,, | Performed by: INTERNAL MEDICINE

## 2019-05-03 PROCEDURE — 3078F PR MOST RECENT DIASTOLIC BLOOD PRESSURE < 80 MM HG: ICD-10-PCS | Mod: CPTII,S$GLB,, | Performed by: INTERNAL MEDICINE

## 2019-05-03 NOTE — PROGRESS NOTES
Subjective:       Patient ID: Jian Arrieta is a 63 y.o. male.    Chief Complaint: Establish Care (prev pcp dr chauncey obrien.   ab pain at 3  )    HPI   Pt with CHF, T2DM with neuropathy, PAF, HTN, HLD, Atherosclerosis, Morbid Obesity, Chronic LE lymphedema, BERHANE, Cirrhosis with ascites/portal HTN, Chronic pancreatitis is here for establishment. Currently pt is c/o worsening LE weakness.   Review of Systems   Constitutional: Negative for activity change, appetite change, chills, diaphoresis, fatigue, fever and unexpected weight change.   HENT: Negative for postnasal drip, rhinorrhea, sinus pressure, sneezing, sore throat, trouble swallowing and voice change.    Respiratory: Negative for cough, shortness of breath and wheezing.    Cardiovascular: Positive for leg swelling. Negative for chest pain and palpitations.   Gastrointestinal: Negative for abdominal pain, blood in stool, constipation, diarrhea, nausea and vomiting.   Genitourinary: Negative for dysuria.   Musculoskeletal: Negative for arthralgias and myalgias.   Skin: Negative for rash and wound.   Allergic/Immunologic: Negative for environmental allergies and food allergies.   Neurological: Positive for weakness.   Hematological: Negative for adenopathy. Does not bruise/bleed easily.       Objective:      Physical Exam   Constitutional: He is oriented to person, place, and time. He appears well-developed and well-nourished. No distress.   HENT:   Head: Normocephalic and atraumatic.   Eyes: Pupils are equal, round, and reactive to light. Conjunctivae and EOM are normal. Right eye exhibits no discharge. Left eye exhibits no discharge. No scleral icterus.   Neck: Normal range of motion. Neck supple. No JVD present.   Cardiovascular: Normal rate, regular rhythm and normal heart sounds.   No murmur heard.  Pulmonary/Chest: Effort normal and breath sounds normal. No respiratory distress. He has no wheezes. He has no rales.   Musculoskeletal: He exhibits edema (3+ in  B/L LE's).   Lymphadenopathy:     He has no cervical adenopathy.   Neurological: He is alert and oriented to person, place, and time. No cranial nerve deficit. Coordination normal.   Skin: Skin is warm and dry. No rash noted. He is not diaphoretic. No pallor.       Assessment:       1. Chronic combined systolic and diastolic heart failure    2. DM type 2 with diabetic peripheral neuropathy    3. Paroxysmal atrial fibrillation    4. Hepatic cirrhosis, unspecified hepatic cirrhosis type, unspecified whether ascites present    5. Coronary artery disease due to calcified coronary lesion    6. Hypertension associated with diabetes    7. Hyperlipidemia associated with type 2 diabetes mellitus    8. Morbid obesity with BMI of 50.0-59.9, adult    9. Lymphedema of both lower extremities    10. Obesity hypoventilation syndrome    11. Atherosclerosis    12. Chronic pancreatitis, unspecified pancreatitis type        Plan:    1. CHF- stable, no s/s of volume overload on exam    2. T2DM with neuropathy/CKD- last A1C of 7.4(3/19)<--6.9(1/19)   3. PAF- stable, CPT   4. Cirrhosis with ascites/portal HTN- followed by Hepatology    5. CAD- stable, CPT   6. HTN- stable, CPT   5. HLD- stable   6. Morbid Obesity- pt advised on proper diet/exercise for weight loss   7. Chronic LE lymphedema- fair control on current meds   8. BERHANE- stable   9. Aortic atherosclerosis- stable  10. Chronic pancreatitis- stable   11. F/u in 4 months

## 2019-05-03 NOTE — TELEPHONE ENCOUNTER
Received call from Chrystal  Nurse with the patient.  She wanted to notify someone that his HR has been in the 40's all week and the patient is weak.  Chrystal believes it is from the freq Para's.  The patient is on metoprolol and lasix.  He went to his PCP today but they did not want to stop any medicines. I was told to contact Hepatology.  I have called Radiology and cannot reach anyone.    Does if appear that he needs an ambulance and go to the ER.  No, he is OK for now, but this has been all week.  Chrystal informed it is almost after hours and we will not be here over the weekend.  We are on call on the weekends and he can call us.  Please give me your contact # and I will reach out to a Dr. Aleshia Almonte NP is not in clinic today.    Spoke with Dr Christensen. Patient is scheduled to see him on Thurs 5/9/19. The metoprolol was ordered by cardiology for his CHF. Cardiology will have to hold the medications.  Have patient go to ER if necessary.    Call returned to Chrystal she stated she spoke with the NP for Dr Mahmood and the metoprolol is on hold for the weekend.

## 2019-05-04 ENCOUNTER — HOSPITAL ENCOUNTER (EMERGENCY)
Facility: HOSPITAL | Age: 64
Discharge: HOME | End: 2019-05-04
Attending: EMERGENCY MEDICINE
Payer: COMMERCIAL

## 2019-05-04 VITALS
RESPIRATION RATE: 15 BRPM | SYSTOLIC BLOOD PRESSURE: 105 MMHG | DIASTOLIC BLOOD PRESSURE: 60 MMHG | TEMPERATURE: 98 F | HEART RATE: 90 BPM | OXYGEN SATURATION: 100 %

## 2019-05-04 DIAGNOSIS — E16.2 HYPOGLYCEMIA: Primary | ICD-10-CM

## 2019-05-04 LAB
POCT GLUCOSE: 218 MG/DL (ref 70–110)
POCT GLUCOSE: 225 MG/DL (ref 70–110)
POCT GLUCOSE: 242 MG/DL (ref 70–110)
POCT GLUCOSE: 261 MG/DL (ref 70–110)
POCT GLUCOSE: 84 MG/DL (ref 70–110)
POCT GLUCOSE: 94 MG/DL (ref 70–110)

## 2019-05-04 PROCEDURE — 99283 EMERGENCY DEPT VISIT LOW MDM: CPT | Mod: 25

## 2019-05-04 PROCEDURE — 99284 EMERGENCY DEPT VISIT MOD MDM: CPT | Mod: ,,, | Performed by: EMERGENCY MEDICINE

## 2019-05-04 PROCEDURE — 99284 PR EMERGENCY DEPT VISIT,LEVEL IV: ICD-10-PCS | Mod: ,,, | Performed by: EMERGENCY MEDICINE

## 2019-05-04 PROCEDURE — 82962 GLUCOSE BLOOD TEST: CPT | Mod: 91

## 2019-05-04 NOTE — ED NOTES
"Pt refused IV and refused blood work due to being a "hard stick", MD at bedside aware, states will try with ultrasounded IV but pt insisted no IV at this time,   "

## 2019-05-04 NOTE — ED TRIAGE NOTES
Pt arrived to ED with CC generalized weakness, diaphoretic,disoriented and low blood pressure per EMS 96/50 and blood sugar 50 in route EMS states 15 g of oral glucose tablet given. Pt denies CP and SOB. Pt denies HA, Dizziness.     Patient identifiers verified and correct for Jian Arrieta.    LOC: The patient is awake, alert and oriented x 4. Pt is speaking appropriately, no slurred speech.  APPEARANCE: Patient resting comfortably and in no acute distress. Pt is clean and well groomed. No JVD visible. Pt reports pain level of 0.  SKIN: left lower leg stage 2 wound, dressing clean dry and intact. Skin is warm dry and color is consistent with ethnicity. No tenting observed and capillary refill <3 seconds. No clubbing noted to nail beds. Mucus membranes moist and acyanotic.  MUSCULOSKELETAL: generalized weakness due to low blood sugar and low blood pressure.  RESPIRATORY: Airway is open and patent. Respirations-unlabored, regular rate, equal bilaterally on inspiration and expiration. No accessory muscle use noted. Lungs clear to auscultation in all fields bilaterally anterior and posterior.   CARDIAC: Patient has regular heart rate and rhythm. Patient has no c/o chest pain. Pitting edema 3+ bilateral to lower extremities noted.   ABDOMEN: Soft and non-tender to palpation with no distention noted. Normoactive bowel sounds X4 quadrants. Pt has no complaints of abnormal bowel movements. Pt reports normal appetite.   NEUROLOGIC: Eyes open spontaneously and facial expression symmetrical. Pt behavior appropriate to situation, and pt follows commands.  Pt reports sensation present in all extremities when touched with a finger. PERRLA  : No complaints of frequency, burning, urgency or blood in the urine.

## 2019-05-04 NOTE — ED PROVIDER NOTES
"Encounter Date: 5/4/2019    SCRIBE #1 NOTE: I, Ivis Luna, am scribing for, and in the presence of,  Dr. Zavaleta. I have scribed the following portions of the note - the APC attestation.       History     Chief Complaint   Patient presents with    Fatigue     Pt arrives c/o generalized weakness. Per EMS, blood sugar was 44. Given 15g oral glucose.      Patient is a 63-year-old white male with a PMH CHF (EF 40% 03/18/2019), chronic pancreatitis, CAD, DM 2, and liver cirrhosis who presents to the ED for urgent evaluation of fatigue and generalized weakness. Patient reports he "bottomed out" this morning. He states he was on the toilet this morning when he could not get up. He reports diaphoresis, muscle weakness, and disorientation. He states his wife and kids are out of town, so he called his neighbor who called 911. He denies loss of consciousness. His BG was noted to be 44 and he was given 15g oral glucose en route. He states he feels much better now. He attributes this episode to his low dietary intake yesterday; he states he only had cucumber salad with his regular amount of insulin. He denies fever/chills, headache, blurry vision, chest pain, SOB, nausea/vomiting, abdominal pain, or dysuria.     The history is provided by the patient.     Review of patient's allergies indicates:  No Known Allergies  Past Medical History:   Diagnosis Date    Alcohol abuse     Anasarca 1/28/2019    Arthropathy associated with neurological disorder 9/2/2015    Atherosclerosis     Charcot foot due to diabetes mellitus     Chronic combined systolic and diastolic heart failure 01/29/2019 1-28-19 Left VentricleModerate decreased ejection fraction at 30%. Normal left ventricular cavity size. Normal wall thickness observed. Grade I (mild) left ventricular diastolic dysfunction consistent with impaired relaxation. Normal left atrial pressure. Moderate, global hypokinesis(see wall scoring diagram). Right VentricleNormal " cavity size, wall thickness and ejection fraction. Wall motion n    Chronic pancreatitis 1/28/2019    Colon polyps     approx 5 yrs ago    Coronary artery disease due to calcified coronary lesion 05/08/2015    5 stents on ASA      Diabetic polyneuropathy associated with type 2 diabetes mellitus 9/2/2015    Diverticulosis 1/28/2019    DM type 2 with diabetic peripheral neuropathy 2/4/2019    Essential hypertension 1/28/2019    Former smoker 8/26/2015    Healed ulcer of left foot on examination 6/20/2017    Hydrocele     approx 1.5 yrs ago    Hypoalbuminemia 2/4/2019    Lymphedema of both lower extremities 1/29/2019    Mixed hyperlipidemia 5/8/2015    Morbid obesity with BMI of 50.0-59.9, adult 5/8/2015    Onychomycosis of multiple toenails with type 2 diabetes mellitus and peripheral neuropathy 6/20/2017    Perianal cyst     approx 2 yrs ago    Pseudocyst of pancreas 1/28/2019 1-28-19 Liver has a cirrhotic morphology with no focal lesions.  Significant interval increase in ascites when compared to prior exam which may account for patient's abdominal distension.  Hypodense air-fluid collection along the body of the pancreas which is slightly smaller when compared to prior CT.  Findings may relate to pancreatic necrosis with pancreatic pseudocysts with infected pseudocyst    Skin cancer     skin cancer    Sleep apnea 8/26/2015    Status post bariatric surgery 1/11/2016    Type 2 diabetes mellitus, with long-term current use of insulin 5/8/2015     Past Surgical History:   Procedure Laterality Date    ANGIOGRAM, CORONARY ARTERY Right 3/20/2019    [facility] at [facility] CATH LAB    ANGIOPLASTY      total x5 stents    Bypass graft study  3/20/2019    Performed by [facility] at [facility] CATH LAB    COLONOSCOPY N/A 10/6/2015    Performed by Shekhar Richards MD at [facility] ENDO (2ND FLR)    CORONARY ARTERY BYPASS GRAFT  2017    x3    CYST REMOVAL      GASTRECTOMY       GASTRECTOMY-SLEEVE-LAPAROSCOPIC - 77688 N/A 2015    Performed by [facility]al Vill[facility]icencio [facility] OR [facility] FLR    KNEE ARTHROSCOPY      perianal surgery      perianal cyst removed     Family History   Problem Relation Age of Onset    Cancer Mother     Cancer Father     Heart disease Father     Obesity Sister     Parkinsonism Brother     No Known Problems Paternal Grandmother     Cancer Paternal Grandfather     Cancer Brother     Diabetes Maternal Grandmother     Stroke Maternal Grandfather     Cirrhosis Neg Hx      Social History     Tobacco Use    Smoking status: Former Smoker     Packs/day: 2.00     Years: 30.00     Pack years: 60.00     Types: Cigarettes     Last attempt to quit: 2005     Years since quittin.2    Smokeless tobacco: Never Used   Substance Use Topics    Alcohol use: No     Comment: started ~, reports 1 shot daily, max 3 shots daily, vague about alcohol consumption. Last drink 2018    Drug use: No     Review of Systems   Constitutional: Positive for diaphoresis. Negative for chills and fever.   HENT: Negative for sore throat.    Eyes: Negative for visual disturbance.   Respiratory: Negative for shortness of breath.    Cardiovascular: Positive for leg swelling. Negative for chest pain.   Gastrointestinal: Negative for abdominal pain, constipation, diarrhea, nausea and vomiting.   Genitourinary: Negative for dysuria and frequency.   Musculoskeletal: Negative for myalgias.   Skin: Positive for wound (chronic).   Neurological: Positive for weakness. Negative for dizziness and headaches.   Psychiatric/Behavioral: Positive for decreased concentration.       Physical Exam     Initial Vitals   BP Pulse Resp Temp SpO2   19 0646 19 0646 19 0646 19 0651 19 0646   (!) 90/50 70 18 97.5 °F (36.4 °C) 98 %      MAP       --                Physical Exam    Nursing note and vitals reviewed.  Constitutional: He appears well-developed and  well-nourished. He is not diaphoretic. He is Obese . No distress.   Patient alert and conversational.   HENT:   Head: Normocephalic and atraumatic.   Mouth/Throat: Oropharynx is clear and moist. No oropharyngeal exudate.   Eyes: Conjunctivae and EOM are normal. Pupils are equal, round, and reactive to light.   Neck: Normal range of motion. Neck supple.   Cardiovascular: Normal rate, regular rhythm, normal heart sounds and intact distal pulses.   Pulmonary/Chest: Breath sounds normal. No respiratory distress. He has no wheezes. He has no rhonchi. He has no rales.   Abdominal: Soft. Bowel sounds are normal. He exhibits distension. He exhibits no mass. There is no tenderness. There is no rebound and no guarding.   Musculoskeletal: He exhibits edema. He exhibits no tenderness.   3+ pitting edema to bilateral lower extremities up to knees.  Chronic, well-healing superficial ulcer noted to the anterior left leg.   Neurological: He is alert and oriented to person, place, and time. GCS score is 15. GCS eye subscore is 4. GCS verbal subscore is 5. GCS motor subscore is 6.   Skin: Skin is warm and dry.   Scattered bruises noted to BUE's.   Psychiatric: He has a normal mood and affect. Thought content normal.         ED Course   Procedures  Labs Reviewed   POCT GLUCOSE - Abnormal; Notable for the following components:       Result Value    POCT Glucose 242 (*)     All other components within normal limits   POCT GLUCOSE - Abnormal; Notable for the following components:    POCT Glucose 261 (*)     All other components within normal limits   POCT GLUCOSE - Abnormal; Notable for the following components:    POCT Glucose 225 (*)     All other components within normal limits   POCT GLUCOSE - Abnormal; Notable for the following components:    POCT Glucose 218 (*)     All other components within normal limits   POCT GLUCOSE   POCT GLUCOSE          Imaging Results    None          Medical Decision Making:   History:   Old Medical  Records: I decided to obtain old medical records.  Initial Assessment:   Patient is a 63-year-old white male with a PMH CHF (EF 40% 03/18/2019), chronic pancreatitis, CAD, DM 2, and liver cirrhosis who presents to the ED for urgent evaluation of fatigue and generalized weakness. Per EMS, BG 44; given 15g oral glucose en route. Patient reports large improvement of symptoms. He is afebrile, alert, and conversational. Pulse 71. /51. SpO2 100% on RA.  Differential Diagnosis:   DDx includes but is not limited to: anti-hyperglycemic, decreased glucose intake, sepsis, hepatic failure, renal failure.  Clinical Tests:   Lab Tests: Ordered and Reviewed  ED Management:  POCT glucose 94. Patient refuses IV access or straight stick for blood work. He was informed of the medical necessity of IV access given his significant co-morbidities and verbalized understanding. Will check serum glucose q30 min and provide meal. Plan for discharge with continued symptomatic improvement and normalization of glucose.    Serial glucose measurements include: 94, 84, 242 (after meal), 261, 225, 218. Patient reports he feels back to baseline. He was monitored in the ED for 3 hours with clinical improvement. Patient with normal gait assessment prior to discharge. Advised resuming home insulin regimen upon discharge with close monitoring of his glucose. Provided diabetic diet education material. Return precautions given. Patient verbalized understanding and is agreeable. I have discussed patient case with my supervisory physician, who is in agreement with my assessment and plan.              Scribe Attestation:   Scribe #1: I performed the above scribed service and the documentation accurately describes the services I performed. I attest to the accuracy of the note.    Attending Attestation:     Physician Attestation Statement for NP/PA:   I have conducted a face to face encounter with this patient in addition to the NP/PA, due to Medical  Complexity    Other NP/PA Attestation Additions:    History of Present Illness: Patient here with hypoglycemia. Had some sweating and feeling weak because of it. Patient feels better now. He refused any blood draws beyond simple glucose check finger sticks. We fed him and will monitor him a bit. Sugar is doing well. I anticipate discharge.                       Clinical Impression:       ICD-10-CM ICD-9-CM   1. Hypoglycemia E16.2 251.2         Disposition:   Disposition: Discharged  Condition: Stable                        Ifrah Dolan PA-C  05/05/19 0891

## 2019-05-04 NOTE — DISCHARGE INSTRUCTIONS
Please follow-up with your primary care physician.   Take your Insulin when you get home and monitor your blood sugar regularly.   Return to the ED for any concerning symptoms.    Our goal in the emergency department is to always give you outstanding care and exceptional service. You may receive a survey by mail or e-mail in the next week regarding your experience in our ED. We would greatly appreciate your completing and returning the survey. Your feedback provides us with a way to recognize our staff who give very good care and it helps us learn how to improve when your experience was below our aspiration of excellence.

## 2019-05-04 NOTE — ED NOTES
Hourly rounding complete. Patient resting comfortably in stretcher and is in NAD at this time. Pt is awake alert and oriented x4, VSS. Pt denies pain at this time. Pt updated on POC. Pt voices no needs at this time.

## 2019-05-06 ENCOUNTER — HOSPITAL ENCOUNTER (OUTPATIENT)
Dept: INTERVENTIONAL RADIOLOGY/VASCULAR | Facility: HOSPITAL | Age: 64
Discharge: HOME | End: 2019-05-06
Attending: NURSE PRACTITIONER
Payer: MEDICARE

## 2019-05-06 VITALS
OXYGEN SATURATION: 100 % | HEART RATE: 104 BPM | DIASTOLIC BLOOD PRESSURE: 84 MMHG | RESPIRATION RATE: 16 BRPM | SYSTOLIC BLOOD PRESSURE: 133 MMHG

## 2019-05-06 DIAGNOSIS — R18.8 OTHER ASCITES: ICD-10-CM

## 2019-05-06 PROBLEM — E11.59 HYPERTENSION ASSOCIATED WITH DIABETES: Status: ACTIVE | Noted: 2019-05-06

## 2019-05-06 PROBLEM — E66.2 OBESITY HYPOVENTILATION SYNDROME: Status: ACTIVE | Noted: 2019-05-06

## 2019-05-06 PROBLEM — I15.2 HYPERTENSION ASSOCIATED WITH DIABETES: Status: ACTIVE | Noted: 2019-05-06

## 2019-05-06 PROBLEM — I10 ESSENTIAL HYPERTENSION: Status: RESOLVED | Noted: 2019-01-28 | Resolved: 2019-05-06

## 2019-05-06 PROBLEM — E78.5 HYPERLIPIDEMIA ASSOCIATED WITH TYPE 2 DIABETES MELLITUS: Status: ACTIVE | Noted: 2019-05-06

## 2019-05-06 PROBLEM — E11.69 HYPERLIPIDEMIA ASSOCIATED WITH TYPE 2 DIABETES MELLITUS: Status: ACTIVE | Noted: 2019-05-06

## 2019-05-06 PROBLEM — K74.60 HEPATIC CIRRHOSIS: Status: ACTIVE | Noted: 2019-05-06

## 2019-05-06 LAB
APPEARANCE FLD: NORMAL
BODY FLD TYPE: NORMAL
COLOR FLD: YELLOW
LYMPHOCYTES NFR FLD MANUAL: 64 %
MESOTHL CELL NFR FLD MANUAL: 6 %
MONOS+MACROS NFR FLD MANUAL: 5 %
NEUTROPHILS NFR FLD MANUAL: 25 %
WBC # FLD: 62 /CU MM

## 2019-05-06 PROCEDURE — 49083 ABD PARACENTESIS W/IMAGING: CPT

## 2019-05-06 PROCEDURE — 89051 BODY FLUID CELL COUNT: CPT

## 2019-05-06 PROCEDURE — 49083 IR PARACENTESIS WITH IMAGING: ICD-10-PCS | Mod: ,,, | Performed by: FAMILY MEDICINE

## 2019-05-06 PROCEDURE — 49083 ABD PARACENTESIS W/IMAGING: CPT | Mod: ,,, | Performed by: FAMILY MEDICINE

## 2019-05-06 NOTE — PROGRESS NOTES
Paracentesis complete. 4800mLs peritoneal fluid drained. Pt tolerated well. Dressing to abdomen clean, dry, and intact. Unable to gain IV access. Np advised.. Specimens sent per lab order. Pt acknowledged discharge instructions. Pt discharged per unit and hospital protocol via wheel chair with FABIANO Wilkinson.

## 2019-05-06 NOTE — PROCEDURES
Radiology Post-Procedure Note    Pre Op Diagnosis: Ascites  Post Op Diagnosis: Same    Procedure: Ultrasound Guided Paracentesis    Procedure performed by: Cele GARCIA, Reyna     Written Informed Consent Obtained: Yes  Specimen Removed: YES cloudy yellow  Estimated Blood Loss: Minimal    Findings:   Successful paracentesis.  Albumin administered PRN per protocol.    Patient tolerated procedure well.    Reyna Oakley, APRN, FNP  Interventional Radiology  [phone] [employer]

## 2019-05-06 NOTE — H&P
Radiology History & Physical      SUBJECTIVE:     Chief Complaint: abdominal distention    History of Present Illness:  Jian Arrieta is a 63 y.o. male who presents for ultrasound guided paracentesis  Past Medical History:   Diagnosis Date    Alcohol abuse     Anasarca 1/28/2019    Arthropathy associated with neurological disorder 9/2/2015    Atherosclerosis     Charcot foot due to diabetes mellitus     Chronic combined systolic and diastolic heart failure 01/29/2019 1-28-19 Left VentricleModerate decreased ejection fraction at 30%. Normal left ventricular cavity size. Normal wall thickness observed. Grade I (mild) left ventricular diastolic dysfunction consistent with impaired relaxation. Normal left atrial pressure. Moderate, global hypokinesis(see wall scoring diagram). Right VentricleNormal cavity size, wall thickness and ejection fraction. Wall motion n    Chronic pancreatitis 1/28/2019    Colon polyps     approx 5 yrs ago    Coronary artery disease due to calcified coronary lesion 05/08/2015    5 stents on ASA      Diabetic polyneuropathy associated with type 2 diabetes mellitus 9/2/2015    Diverticulosis 1/28/2019    DM type 2 with diabetic peripheral neuropathy 2/4/2019    Essential hypertension 1/28/2019    Former smoker 8/26/2015    Healed ulcer of left foot on examination 6/20/2017    Hydrocele     approx 1.5 yrs ago    Hypoalbuminemia 2/4/2019    Lymphedema of both lower extremities 1/29/2019    Mixed hyperlipidemia 5/8/2015    Morbid obesity with BMI of 50.0-59.9, adult 5/8/2015    Onychomycosis of multiple toenails with type 2 diabetes mellitus and peripheral neuropathy 6/20/2017    Perianal cyst     approx 2 yrs ago    Pseudocyst of pancreas 1/28/2019 1-28-19 Liver has a cirrhotic morphology with no focal lesions.  Significant interval increase in ascites when compared to prior exam which may account for patient's abdominal distension.  Hypodense air-fluid collection  along the body of the pancreas which is slightly smaller when compared to prior CT.  Findings may relate to pancreatic necrosis with pancreatic pseudocysts with infected pseudocyst    Skin cancer     skin cancer    Sleep apnea 8/26/2015    Status post bariatric surgery 1/11/2016    Type 2 diabetes mellitus, with long-term current use of insulin 5/8/2015     Past Surgical History:   Procedure Laterality Date    ANGIOGRAM, CORONARY ARTERY Right 3/20/2019    [facility] at [facility] C[facility]    ANGIOPLASTY      total x5 stents    Bypass graft study  3/20/2019    [facility] at [facility] C[facility]    COLONOSCOPY N/A 10/6/2015    Performed by Shekhar Richards MD at [facility] ENDO (2ND FLR)    CORONARY ARTERY BYPASS GRAFT  2017    x3    CYST REMOVAL      GASTRECTOMY      GASTRECTOMY-SLEEVE-LAPAROSCOPIC - 76765 N/A 12/22/2015    Performed by Micheal Villavicencio Jr., MD at [facility] OR 2ND FLR    KNEE ARTHROSCOPY      perianal surgery      perianal cyst removed       Home Meds:   Prior to Admission medications    Medication Sig Start Date End Date Taking? Authorizing Provider   apixaban (ELIQUIS) 5 mg Tab Take 1 tablet (5 mg total) by mouth 2 (two) times daily. 3/20/19   Lorie Higuera MD   atorvastatin (LIPITOR) 40 MG tablet Take 1 tablet (40 mg total) by mouth once daily. 2/4/19 2/4/20  Alfonso Mahmood MD   clopidogrel (PLAVIX) 75 mg tablet Take 1 tablet (75 mg total) by mouth once daily. 3/21/19 3/20/20  Lorie Higuera MD   cyanocobalamin, vitamin B-12, 500 mcg Subl Place 1 tablet under the tongue every Monday.     Historical Provider, MD   furosemide (LASIX) 40 MG tablet Take 1 tablet (40 mg total) by mouth once daily. 2/4/19 2/4/20  Alfonso Mahmood MD   insulin aspart U-100 (NOVOLOG) 100 unit/mL injection Inject 4 Units into the skin 3 (three) times daily before meals.    Historical Provider, MD   insulin detemir (LEVEMIR FLEXPEN SUBQ) Inject 12 Units into the skin once daily.      Historical Provider, MD   lipase-protease-amylase (CREON) 36,000-114,000- 180,000 unit CpDR Take 1 capsule by mouth 3 (three) times daily. 2/4/19   Alfonso Mahmood MD   metoprolol succinate (TOPROL-XL) 100 MG 24 hr tablet Take 1 tablet (100 mg total) by mouth once daily. 2/4/19 2/4/20  Alfonso Mahmood MD   omeprazole (PRILOSEC) 40 MG capsule Take 40 mg by mouth once daily. 2/18/19   Historical Provider, MD   ONETOUCH ULTRA TEST Strp 4 (four) times daily. Use to test blood sugar four times a day. 10/15/15   Historical Provider, MD   tamsulosin (FLOMAX) 0.4 mg Cap Take 1 capsule by mouth once daily. Pt has not started taking as of 2/27/19. 2/5/19   Historical Provider, MD   vitamin D (VITAMIN D3) 1000 units Tab Take 1 tablet (1,000 Units total) by mouth once daily. 1/31/19   Jian Lambert MD     Anticoagulants/Antiplatelets: Plavix and eliquis    Allergies: Review of patient's allergies indicates:  No Known Allergies  Sedation History:  no adverse reactions    Review of Systems:   Hematological: no known coagulopathies  Respiratory: no shortness of breath  Cardiovascular: no chest pain  Gastrointestinal: no abdominal pain  Genito-Urinary: no dysuria  Musculoskeletal: negative  Neurological: no TIA or stroke symptoms         OBJECTIVE:     Vital Signs (Most Recent)  Pulse: 106 (05/06/19 1001)  Resp: 16 (05/06/19 1001)  BP: 136/66 (05/06/19 1001)  SpO2: 100 % (05/06/19 1001)    Physical Exam:  ASA: 2  Mallampati: n/a    General: no acute distress  Mental Status: alert and oriented to person, place and time  HEENT: normocephalic, atraumatic  Chest: unlabored breathing  Heart: regular heart rate  Abdomen: distended  Extremity: moves all extremities    Laboratory  Lab Results   Component Value Date    INR 1.3 (H) 03/27/2019       Lab Results   Component Value Date    WBC 4.67 03/19/2019    HGB 9.0 (L) 03/19/2019    HCT 27.8 (L) 03/19/2019    MCV 88 03/19/2019     03/19/2019      Lab Results   Component Value Date    GLU  220 (H) 04/04/2019     (L) 04/04/2019    K 5.1 04/04/2019     04/04/2019    CO2 18 (L) 04/04/2019    BUN 48 (H) 04/04/2019    CREATININE 1.9 (H) 04/04/2019    CALCIUM 7.7 (L) 04/04/2019    MG 1.6 03/15/2019    ALT 17 04/04/2019    AST 19 04/04/2019    ALBUMIN 2.3 (L) 04/04/2019    BILITOT 0.4 04/04/2019    BILIDIR 0.2 07/10/2015       ASSESSMENT/PLAN:     Sedation Plan: local  Patient will undergo ultrasound guided paracentesis.    Reyna Oakley, APRN, FNP  Interventional Radiology  [phone] [employer]

## 2019-05-06 NOTE — DISCHARGE INSTRUCTIONS
For scheduling: Call Isabelle at [phone]    For questions or concerns call: KESHAV MON-FRI 8 AM- 5PM [phone]. Radiology resident on call [phone].    For immediate concerns that are not emergent, you may call our radiology clinic at: [phone]...

## 2019-05-08 ENCOUNTER — TELEPHONE (OUTPATIENT)
Dept: INTERVENTIONAL RADIOLOGY/VASCULAR | Facility: HOSPITAL | Age: 64
End: 2019-05-08

## 2019-05-09 ENCOUNTER — HOSPITAL ENCOUNTER (OUTPATIENT)
Dept: INTERVENTIONAL RADIOLOGY/VASCULAR | Facility: HOSPITAL | Age: 64
Discharge: HOME | End: 2019-05-09
Attending: NURSE PRACTITIONER
Payer: MEDICARE

## 2019-05-09 ENCOUNTER — OFFICE VISIT (OUTPATIENT)
Dept: HEPATOLOGY | Facility: CLINIC | Age: 64
End: 2019-05-09
Payer: COMMERCIAL

## 2019-05-09 VITALS
SYSTOLIC BLOOD PRESSURE: 85 MMHG | HEART RATE: 81 BPM | TEMPERATURE: 96 F | RESPIRATION RATE: 18 BRPM | WEIGHT: 273.56 LBS | DIASTOLIC BLOOD PRESSURE: 58 MMHG | BODY MASS INDEX: 42.93 KG/M2 | HEIGHT: 67 IN | OXYGEN SATURATION: 100 %

## 2019-05-09 VITALS
DIASTOLIC BLOOD PRESSURE: 55 MMHG | RESPIRATION RATE: 16 BRPM | SYSTOLIC BLOOD PRESSURE: 126 MMHG | HEART RATE: 109 BPM | OXYGEN SATURATION: 100 %

## 2019-05-09 DIAGNOSIS — R18.8 OTHER ASCITES: Primary | ICD-10-CM

## 2019-05-09 DIAGNOSIS — K76.0 FATTY LIVER DISEASE, NONALCOHOLIC: ICD-10-CM

## 2019-05-09 DIAGNOSIS — R18.8 OTHER ASCITES: ICD-10-CM

## 2019-05-09 PROBLEM — K74.60 HEPATIC CIRRHOSIS: Status: RESOLVED | Noted: 2019-05-06 | Resolved: 2019-05-09

## 2019-05-09 LAB
APPEARANCE FLD: NORMAL
BODY FLD TYPE: NORMAL
COLOR FLD: YELLOW
LYMPHOCYTES NFR FLD MANUAL: 70 %
MESOTHL CELL NFR FLD MANUAL: 8 %
MONOS+MACROS NFR FLD MANUAL: 3 %
NEUTROPHILS NFR FLD MANUAL: 19 %
WBC # FLD: 82 /CU MM

## 2019-05-09 PROCEDURE — 99999 PR PBB SHADOW E&M-EST. PATIENT-LVL V: ICD-10-PCS | Mod: PBBFAC,,, | Performed by: INTERNAL MEDICINE

## 2019-05-09 PROCEDURE — 3008F PR BODY MASS INDEX (BMI) DOCUMENTED: ICD-10-PCS | Mod: CPTII,S$GLB,, | Performed by: INTERNAL MEDICINE

## 2019-05-09 PROCEDURE — 3074F SYST BP LT 130 MM HG: CPT | Mod: CPTII,S$GLB,, | Performed by: INTERNAL MEDICINE

## 2019-05-09 PROCEDURE — 99214 OFFICE O/P EST MOD 30 MIN: CPT | Mod: S$GLB,,, | Performed by: INTERNAL MEDICINE

## 2019-05-09 PROCEDURE — 3074F PR MOST RECENT SYSTOLIC BLOOD PRESSURE < 130 MM HG: ICD-10-PCS | Mod: CPTII,S$GLB,, | Performed by: INTERNAL MEDICINE

## 2019-05-09 PROCEDURE — 3078F DIAST BP <80 MM HG: CPT | Mod: CPTII,S$GLB,, | Performed by: INTERNAL MEDICINE

## 2019-05-09 PROCEDURE — 99214 PR OFFICE/OUTPT VISIT, EST, LEVL IV, 30-39 MIN: ICD-10-PCS | Mod: S$GLB,,, | Performed by: INTERNAL MEDICINE

## 2019-05-09 PROCEDURE — 89051 BODY FLUID CELL COUNT: CPT

## 2019-05-09 PROCEDURE — 3078F PR MOST RECENT DIASTOLIC BLOOD PRESSURE < 80 MM HG: ICD-10-PCS | Mod: CPTII,S$GLB,, | Performed by: INTERNAL MEDICINE

## 2019-05-09 PROCEDURE — 3008F BODY MASS INDEX DOCD: CPT | Mod: CPTII,S$GLB,, | Performed by: INTERNAL MEDICINE

## 2019-05-09 PROCEDURE — 49083 ABD PARACENTESIS W/IMAGING: CPT | Mod: ,,, | Performed by: RADIOLOGY

## 2019-05-09 PROCEDURE — 99999 PR PBB SHADOW E&M-EST. PATIENT-LVL V: CPT | Mod: PBBFAC,,, | Performed by: INTERNAL MEDICINE

## 2019-05-09 PROCEDURE — 99499 RISK ADDL DX/OHS AUDIT: ICD-10-PCS | Mod: S$GLB,,, | Performed by: INTERNAL MEDICINE

## 2019-05-09 PROCEDURE — 49083 ABD PARACENTESIS W/IMAGING: CPT

## 2019-05-09 PROCEDURE — 49083 IR PARACENTESIS WITH IMAGING: ICD-10-PCS | Mod: ,,, | Performed by: RADIOLOGY

## 2019-05-09 PROCEDURE — 99499 UNLISTED E&M SERVICE: CPT | Mod: S$GLB,,, | Performed by: INTERNAL MEDICINE

## 2019-05-09 NOTE — LETTER
May 9, 2019        Leon Barajas, DO  [address] [facility] [place] [address]             [facility] - Hepatology  [address]  [place] [address]  Phone: [phone]  Fax: [phone]   Patient: Jian Arrieta   MR Number: [id number]   YOB: 1955   Date of Visit: 5/9/2019       Dear Dr. Barajas:    Thank you for referring Jian Arrieta to me for evaluation. Attached you will find relevant portions of my assessment and plan of care.    If you have questions, please do not hesitate to call me. I look forward to following Jian Arrieta along with you.    Sincerely,      Jose Christensen MD            CC  No Recipients    Enclosure

## 2019-05-09 NOTE — PROGRESS NOTES
Paracentesis complete, 3000 MLs removed. Specimen sent to lab. No Albumin administered per protocol, Patient refusing IV . Dressing applied to RLQ puncture site, dressing clean dry and intact. Pt given discharge instructions and handouts, pt verbalizes understanding. Questions answered. Pt denies pain and discomfort. Pt to lobby via wheelchair, to be transported home with family.

## 2019-05-09 NOTE — LETTER
May 9, 2019        Jaime Carney III, MD  [address] [facility] [address]             [facility] - Hepatology  [address]  [facility] [address]  Phone: [phone]  Fax: [phone]   Patient: Jian Arrieta   MR Number: [id number]   YOB: 1955   Date of Visit: 5/9/2019       Dear Dr. Carney:    Thank you for referring Jian Arrieta to me for evaluation. Below are the relevant portions of my assessment and plan of care.    The patient's corrected sinusoidal pressure or HVPG is 2 mmHg. Liver biopsy showed no cirrhosis or significant fibrosis. There is no evidence of portal hypertension related to liver disease.    If you have questions, please do not hesitate to call me. I look forward to following Jian along with you.    Sincerely,      Jose Christensen MD           CC  No Recipients

## 2019-05-09 NOTE — Clinical Note
The patient and wife do not believe that he does not have cirrhosis. It was very difficult to explain that Cardiology told them that ascites is from cirrhosis.

## 2019-05-09 NOTE — H&P
Radiology History & Physical      SUBJECTIVE:     Chief Complaint: abdo discomfort, ascites    History of Present Illness:  Jian Arrieta is a 63 y.o. male who presents for evaluation for paracentesis  Past Medical History:   Diagnosis Date    Alcohol abuse     Anasarca 1/28/2019    Arthropathy associated with neurological disorder 9/2/2015    Atherosclerosis     Charcot foot due to diabetes mellitus     Chronic combined systolic and diastolic heart failure 01/29/2019 1-28-19 Left VentricleModerate decreased ejection fraction at 30%. Normal left ventricular cavity size. Normal wall thickness observed. Grade I (mild) left ventricular diastolic dysfunction consistent with impaired relaxation. Normal left atrial pressure. Moderate, global hypokinesis(see wall scoring diagram). Right VentricleNormal cavity size, wall thickness and ejection fraction. Wall motion n    Chronic pancreatitis 1/28/2019    Colon polyps     approx 5 yrs ago    Coronary artery disease due to calcified coronary lesion 05/08/2015    5 stents on ASA      Diabetic polyneuropathy associated with type 2 diabetes mellitus 9/2/2015    Diverticulosis 1/28/2019    DM type 2 with diabetic peripheral neuropathy 2/4/2019    Essential hypertension 1/28/2019    Former smoker 8/26/2015    Healed ulcer of left foot on examination 6/20/2017    Hydrocele     approx 1.5 yrs ago    Hypoalbuminemia 2/4/2019    Lymphedema of both lower extremities 1/29/2019    Mixed hyperlipidemia 5/8/2015    Morbid obesity with BMI of 50.0-59.9, adult 5/8/2015    Onychomycosis of multiple toenails with type 2 diabetes mellitus and peripheral neuropathy 6/20/2017    Perianal cyst     approx 2 yrs ago    Pseudocyst of pancreas 1/28/2019 1-28-19 Liver has a cirrhotic morphology with no focal lesions.  Significant interval increase in ascites when compared to prior exam which may account for patient's abdominal distension.  Hypodense air-fluid collection  along the body of the pancreas which is slightly smaller when compared to prior CT.  Findings may relate to pancreatic necrosis with pancreatic pseudocysts with infected pseudocyst    Skin cancer     skin cancer    Sleep apnea 8/26/2015    Status post bariatric surgery 1/11/2016    Type 2 diabetes mellitus, with long-term current use of insulin 5/8/2015     Past Surgical History:   Procedure Laterality Date    ANGIOGRAM, CORONARY ARTERY Right 3/20/2019    [facility] CATH LAB    ANGIOPLASTY      total x5 stents    Bypass graft study  3/20/2019    Performed by [facility] at [facility] CATH LAB    COLONOSCOPY N/A 10/6/2015    Performed by Shekhar Richards MD at [facility] ENDO (2ND FLR)    CORONARY ARTERY BYPASS GRAFT  2017    x3    CYST REMOVAL      GASTRECTOMY      GASTRECTOMY-SLEEVE-LAPAROSCOPIC - [id number] N/A 12/22/2015    Performed by Micheal Villavicencio Jr., MD at [facility] OR 2ND FLR    KNEE ARTHROSCOPY      perianal surgery      perianal cyst removed       Home Meds:   Prior to Admission medications    Medication Sig Start Date End Date Taking? Authorizing Provider   apixaban (ELIQUIS) 5 mg Tab Take 1 tablet (5 mg total) by mouth 2 (two) times daily. 3/20/19   Lorie Higuera MD   atorvastatin (LIPITOR) 40 MG tablet Take 1 tablet (40 mg total) by mouth once daily. 2/4/19 2/4/20  Alfonso Mahmood MD   clopidogrel (PLAVIX) 75 mg tablet Take 1 tablet (75 mg total) by mouth once daily. 3/21/19 3/20/20  Lorie Higuera MD   cyanocobalamin, vitamin B-12, 500 mcg Subl Place 1 tablet under the tongue every Monday.     Historical Provider, MD   furosemide (LASIX) 40 MG tablet Take 1 tablet (40 mg total) by mouth once daily. 2/4/19 2/4/20  Alfonso Mahmood MD   insulin aspart U-100 (NOVOLOG) 100 unit/mL injection Inject 4 Units into the skin 3 (three) times daily before meals.    Historical Provider, MD   insulin detemir (LEVEMIR FLEXPEN SUBQ) Inject 12 Units into the skin once daily.      Historical Provider, MD   lipase-protease-amylase (CREON) 36,000-114,000- 180,000 unit CpDR Take 1 capsule by mouth 3 (three) times daily. 2/4/19   Alfonso Mahmood MD   metoprolol succinate (TOPROL-XL) 100 MG 24 hr tablet Take 1 tablet (100 mg total) by mouth once daily. 2/4/19 2/4/20  Alfonso Mahmood MD   omeprazole (PRILOSEC) 40 MG capsule Take 40 mg by mouth once daily. 2/18/19   Historical Provider, MD   ONETOUCH ULTRA TEST Strp 4 (four) times daily. Use to test blood sugar four times a day. 10/15/15   Historical Provider, MD   tamsulosin (FLOMAX) 0.4 mg Cap Take 1 capsule by mouth once daily. Pt has not started taking as of 2/27/19. 2/5/19   Historical Provider, MD   vitamin D (VITAMIN D3) 1000 units Tab Take 1 tablet (1,000 Units total) by mouth once daily. 1/31/19   Jian Lambert MD     Anticoagulants/Antiplatelets: ASA 81 and apixiban    Allergies: Review of patient's allergies indicates:  No Known Allergies  Sedation History:  no adverse reactions    Review of Systems:   Hematological: negative  no known coagulopathies  Respiratory: no cough, shortness of breath, or wheezing  no shortness of breath  Cardiovascular: no chest pain or dyspnea on exertion  no chest pain  Gastrointestinal: no abdominal pain  Genito-Urinary: no dysuria, trouble voiding, or hematuria  no dysuria  Musculoskeletal: negative  Neurological: no TIA or stroke symptoms         OBJECTIVE:     Vital Signs (Most Recent)       Physical Exam:  ASA: 2  Mallampati: NA    General: no acute distress  Mental Status: alert and oriented to person, place and time  HEENT: normocephalic, atraumatic  Chest: unlabored breathing  Heart: regular heart rate  Abdomen: nondistended  Extremity: moves all extremities    Laboratory  Lab Results   Component Value Date    INR 1.3 (H) 03/27/2019       Lab Results   Component Value Date    WBC 4.67 03/19/2019    HGB 9.0 (L) 03/19/2019    HCT 27.8 (L) 03/19/2019    MCV 88 03/19/2019     03/19/2019      Lab Results    Component Value Date     (H) 04/04/2019     (L) 04/04/2019    K 5.1 04/04/2019     04/04/2019    CO2 18 (L) 04/04/2019    BUN 48 (H) 04/04/2019    CREATININE 1.9 (H) 04/04/2019    CALCIUM 7.7 (L) 04/04/2019    MG 1.6 03/15/2019    ALT 17 04/04/2019    AST 19 04/04/2019    ALBUMIN 2.3 (L) 04/04/2019    BILITOT 0.4 04/04/2019    BILIDIR 0.2 07/10/2015       ASSESSMENT/PLAN:     Sedation Plan: local  Patient will undergo paracentesis    Nick Oreilly MD, MS  Radiology  Pager: [phone]

## 2019-05-09 NOTE — PROGRESS NOTES
Hepatology Clinic Note      Chief complaint:   Chief Complaint   Patient presents with    Ascites       HPI:  Jian Arrieta is a pleasant 63 y.o. malewho was referred to Hepatology Clinic for consultation of had concerns including Cirrhosis and Ascites..      Follow up  2019: reviewed ascitic fluid analysis, high ascitic fluid protein, TJ liver biopsy and HVPG - no sign of cirrhosis or portal hypertension, ascites is from heart failure, recommend to follow up with Cardiology. Patient desires peritoneal tunneled cath placement - ordered.    3/15/19: TJ hepatic pressure: HVP mmHg. (HVPG would be > 10 in clinically significant portal hypertension)    Ascitic fluid: High SAAG and high ascitic protein: consistent with cardiac in origin    LIVER, RANDOM (TRANSJUGULAR BIOPSY):  Steatohepatitis with mild steatosis, 5-10%  Pericellular and portal fibrosis (stage 1 of 4), see comment  No hepatocyte iron  Iron and trichrome stains with appropriate controls    Background  Patient was seen with Aleshia Almonte NP.    Mr. Arrieta is a very pleasant 63-year-old gentleman who is referred for newly diagnosed ascites and imaging finding of nodular liver. He has risk factors for both MARIE and HUGH, reported alcohol use disorder in the past. 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 Ascitic fluid analysis was not consistent with portal hypertension. He has normal platelet count and normal spleen size. He could have alcohol liver disease and fibrosis however I doubt that he has clinically significant portal hypertension. Nodular liver on imaging can be due to congestive hepatopathy. His chronic viral hepatitis panel was negative. He is not a liver transplant candidate due to low left ventricular ejection fraction. I recommend transjugular liver biopsy with hepatic venous pressure measurement to establish/confirm the diagnosis of cirrhosis.       Patient Active Problem List   Diagnosis    Morbid obesity with BMI of 50.0-59.9, adult     Coronary artery disease due to calcified coronary lesion    Sleep apnea    Diabetic polyneuropathy associated with type 2 diabetes mellitus    Status post bariatric surgery    Anasarca    Atherosclerosis    Chronic pancreatitis    Pseudocyst of pancreas    Chronic combined systolic and diastolic heart failure    Lymphedema of both lower extremities    DM type 2 with diabetic peripheral neuropathy    Hypoalbuminemia    Myocardiopathy    Ascites    Anemia    NSTEMI (non-ST elevated myocardial infarction)    Paroxysmal atrial fibrillation    Hepatic cirrhosis    Hypertension associated with diabetes    Hyperlipidemia associated with type 2 diabetes mellitus    Obesity hypoventilation syndrome       Past Medical History:   Diagnosis Date    Alcohol abuse     Anasarca 1/28/2019    Arthropathy associated with neurological disorder 9/2/2015    Atherosclerosis     Charcot foot due to diabetes mellitus     Chronic combined systolic and diastolic heart failure 01/29/2019 1-28-19 Left VentricleModerate decreased ejection fraction at 30%. Normal left ventricular cavity size. Normal wall thickness observed. Grade I (mild) left ventricular diastolic dysfunction consistent with impaired relaxation. Normal left atrial pressure. Moderate, global hypokinesis(see wall scoring diagram). Right VentricleNormal cavity size, wall thickness and ejection fraction. Wall motion n    Chronic pancreatitis 1/28/2019    Colon polyps     approx 5 yrs ago    Coronary artery disease due to calcified coronary lesion 05/08/2015    5 stents on ASA      Diabetic polyneuropathy associated with type 2 diabetes mellitus 9/2/2015    Diverticulosis 1/28/2019    DM type 2 with diabetic peripheral neuropathy 2/4/2019    Essential hypertension 1/28/2019    Former smoker 8/26/2015    Healed ulcer of left foot on examination 6/20/2017    Hydrocele     approx 1.5 yrs ago    Hypoalbuminemia 2/4/2019    Lymphedema of both  lower extremities 1/29/2019    Mixed hyperlipidemia 5/8/2015    Morbid obesity with BMI of 50.0-59.9, adult 5/8/2015    Onychomycosis of multiple toenails with type 2 diabetes mellitus and peripheral neuropathy 6/20/2017    Perianal cyst     approx 2 yrs ago    Pseudocyst of pancreas 1/28/2019 1-28-19 Liver has a cirrhotic morphology with no focal lesions.  Significant interval increase in ascites when compared to prior exam which may account for patient's abdominal distension.  Hypodense air-fluid collection along the body of the pancreas which is slightly smaller when compared to prior CT.  Findings may relate to pancreatic necrosis with pancreatic pseudocysts with infected pseudocyst    Skin cancer     skin cancer    Sleep apnea 8/26/2015    Status post bariatric surgery 1/11/2016    Type 2 diabetes mellitus, with long-term current use of insulin 5/8/2015       Past Surgical History:   Procedure Laterality Date    ANGIOGRAM, CORONARY ARTERY Right 3/20/2019    [facility] CATH LAB    ANGIOPLASTY      total x5 stents    Bypass graft study  3/20/2019    [facility] by [facility] at [facility] CATH LAB    COLONOSCOPY N/A 10/6/2015    Performed by Shekhar Richards MD at [facility] ENDO (2ND FLR)    CORONARY ARTERY BYPASS GRAFT  2017    x3    CYST REMOVAL      GASTRECTOMY      GASTRECTOMY-SLEEVE-LAPAROSCOPIC - 84117 N/A 12/22/2015    Performed by Micheal Villavicencio Jr., MD at [facility] OR 2ND FLR    KNEE ARTHROSCOPY      perianal surgery      perianal cyst removed       Family History   Problem Relation Age of Onset    Cancer Mother     Cancer Father     Heart disease Father     Obesity Sister     Parkinsonism Brother     No Known Problems Paternal Grandmother     Cancer Paternal Grandfather     Cancer Brother     Diabetes Maternal Grandmother     Stroke Maternal Grandfather     Cirrhosis Neg Hx        Social History     Socioeconomic History    Marital status:       Spouse name: Not on file    Number of children: Not on file    Years of education: Not on file    Highest education level: Not on file   Occupational History    Not on file   Social Needs    Financial resource strain: Not on file    Food insecurity:     Worry: Not on file     Inability: Not on file    Transportation needs:     Medical: Not on file     Non-medical: Not on file   Tobacco Use    Smoking status: Former Smoker     Packs/day: 2.00     Years: 30.00     Pack years: 60.00     Types: Cigarettes     Last attempt to quit: 2005     Years since quittin.2    Smokeless tobacco: Never Used   Substance and Sexual Activity    Alcohol use: No     Comment: started ~, reports 1 shot daily, max 3 shots daily, vague about alcohol consumption. Last drink 2018    Drug use: No    Sexual activity: Not on file   Lifestyle    Physical activity:     Days per week: Not on file     Minutes per session: Not on file    Stress: Not on file   Relationships    Social connections:     Talks on phone: Not on file     Gets together: Not on file     Attends [employer] service: Not on file     Active member of club or organization: Not on file     Attends meetings of clubs or organizations: Not on file     Relationship status: Not on file   Other Topics Concern    Not on file   Social History Narrative    Not on file       Current Outpatient Medications   Medication Sig Dispense Refill    apixaban (ELIQUIS) 5 mg Tab Take 1 tablet (5 mg total) by mouth 2 (two) times daily. 90 tablet 3    atorvastatin (LIPITOR) 40 MG tablet Take 1 tablet (40 mg total) by mouth once daily. 90 tablet 3    clopidogrel (PLAVIX) 75 mg tablet Take 1 tablet (75 mg total) by mouth once daily. 30 tablet 11    cyanocobalamin, vitamin B-12, 500 mcg Subl Place 1 tablet under the tongue every Monday.       furosemide (LASIX) 40 MG tablet Take 1 tablet (40 mg total) by mouth once daily. 90 tablet 3    insulin aspart U-100  (NOVOLOG) 100 unit/mL injection Inject 4 Units into the skin 3 (three) times daily before meals.      insulin detemir (LEVEMIR FLEXPEN SUBQ) Inject 12 Units into the skin once daily.       lipase-protease-amylase (CREON) 36,000-114,000- 180,000 unit CpDR Take 1 capsule by mouth 3 (three) times daily. 270 capsule 3    metoprolol succinate (TOPROL-XL) 100 MG 24 hr tablet Take 1 tablet (100 mg total) by mouth once daily. 90 tablet 3    omeprazole (PRILOSEC) 40 MG capsule Take 40 mg by mouth once daily.  8    ONETOUCH ULTRA TEST Strp 4 (four) times daily. Use to test blood sugar four times a day.  3    tamsulosin (FLOMAX) 0.4 mg Cap Take 1 capsule by mouth once daily. Pt has not started taking as of 2/27/19.  0    vitamin D (VITAMIN D3) 1000 units Tab Take 1 tablet (1,000 Units total) by mouth once daily.       No current facility-administered medications for this visit.        Review of patient's allergies indicates:  No Known Allergies    Review of Systems   Constitutional: Positive for malaise/fatigue. Negative for chills, fever and weight loss.   HENT: Negative for congestion, nosebleeds and sore throat.    Eyes: Negative for blurred vision, double vision and photophobia.   Respiratory: Positive for shortness of breath. Negative for cough.    Cardiovascular: Positive for orthopnea, leg swelling and PND. Negative for chest pain and palpitations.   Gastrointestinal: Negative for abdominal pain, blood in stool, constipation, diarrhea, melena and vomiting.   Genitourinary: Negative for dysuria.   Musculoskeletal: Negative for falls and joint pain.   Skin: Negative for itching and rash.   Neurological: Positive for weakness. Negative for dizziness and tremors.   Endo/Heme/Allergies: Does not bruise/bleed easily.   Psychiatric/Behavioral: Negative for depression and substance abuse. The patient is not nervous/anxious and does not have insomnia.        Vitals:    05/09/19 1558   BP: (!) 85/58   Pulse: 81   Resp: 18  "  Temp: 96.4 °F (35.8 °C)   TempSrc: Oral   SpO2: 100%   Weight: 124.1 kg (273 lb 9.5 oz)   Height: 5' 7" (1.702 m)       Physical Exam   Constitutional: He appears well-developed.   Eyes: No scleral icterus.   Abdominal: Soft. Bowel sounds are normal. He exhibits distension.   Musculoskeletal: He exhibits edema (3+).   Nursing note and vitals reviewed.      LABS: I personally reviewed pertinent laboratory findings.    Lab Results   Component Value Date    ALT 17 04/04/2019    AST 19 04/04/2019    ALKPHOS 134 04/04/2019    BILITOT 0.4 04/04/2019       Lab Results   Component Value Date    WBC 4.67 03/19/2019    HGB 9.0 (L) 03/19/2019    HCT 27.8 (L) 03/19/2019    MCV 88 03/19/2019     03/19/2019       Lab Results   Component Value Date     (L) 04/04/2019    K 5.1 04/04/2019     04/04/2019    CO2 18 (L) 04/04/2019    BUN 48 (H) 04/04/2019    CREATININE 1.9 (H) 04/04/2019    CALCIUM 7.7 (L) 04/04/2019    ANIONGAP 7 (L) 04/04/2019    ESTGFRAFRICA 42.1 (A) 04/04/2019    EGFRNONAA 36.4 (A) 04/04/2019       Lab Results   Component Value Date    HEPAIGM Negative 01/28/2019    HEPBIGM Negative 01/28/2019    HEPBCAB Negative 02/07/2019    HEPCAB Negative 01/28/2019    HEPCAB Negative 01/28/2019       Lab Results   Component Value Date    SMOOTHMUSCAB Negative 1:40 02/07/2019       I personally reviewed all recent lab results.  I personally reviewed imaging studies.    Assessment:  63 y.o. male presenting with     1. Other ascites      Ascites: not related to portal hypertension.. No cirrhosis or high grade fibrosis on the biopsy. No portal hypertension, measured HVPG was 2 mmHg. ( HVPG > 12 in clinically significant portal hypertension).  He has normal platelet count.  He has normal liver synthetic function.    Patient and wife were very upset to learn that patient does not have cirrhosis or portal hypertension. They did not think that his ascites is from cardiac origin.    I explained to them that based " on ascitic fluid analysis, normal liver synthetic functions, normal platelet count, normal measured HVPG 2 mmHg (specifically ordered TJ biopsy for this reason) and no histopathological evidence of high grade fibrosis, I have no clinical evidence to conclude that his ascites is related to portal hypertension.    Lab Results   Component Value Date    BILITOT 0.4 04/04/2019     Lab Results   Component Value Date    INR 1.3 (H) 03/27/2019    INR 1.2 03/21/2019    INR 1.2 03/20/2019       Recommendation(s):  - continue diuretic and low sodium diet for ascites  - per patient request, tunneled peritoneal cath placement ordered  - no need for HCC screening as patient does not have cirrhosis  - counseled for fatty liver    A total of 25 minutes were spent face-to-face with the patient during this encounter and over half of that time was spent on counseling and coordination of care.  We discussed in depth the nature of the patient's liver disease, the management plan in details. I also educated the patient about lifestyle modifications which may improve hepatic steatosis, overweight/obesity, insulin resistance and high blood pressure issues. I have provided the patient with an opportunity to ask questions and have all questions answered to his satisfaction.     I have sent communication to the referring physician and/or primary care provider.    Jose Christensen MD  Staff Physician  Hepatology and Liver Transplant  [facility]  [facility] [place]

## 2019-05-09 NOTE — PATIENT INSTRUCTIONS
You do not have any evidence of cirrhosis or liver failure.  Ascites is not related to liver disease.  Please follow up with Cardiology.  Continue low sodium diet, diuretic therapy per Cardiology.  Tunneled peritoneal cath ordered per patient's request.

## 2019-05-09 NOTE — DISCHARGE INSTRUCTIONS
For scheduling: Call Isabelle at [phone]    For questions or concerns call: KESHAV MON-FRI 8 AM- 5PM [phone]. Radiology resident on call [phone].    For immediate concerns that are not emergent, you may call our radiology clinic at: [phone]

## 2019-05-09 NOTE — PROCEDURE NOTE ADDENDUM
Radiology Post-Procedure Note    Pre Op Diagnosis: Ascites  Post Op Diagnosis: Same    Procedure: Paracentesis    Procedure performed by: Nick Oreilly MD    Written Informed Consent Obtained: Yes  Specimen Removed: YES, two 10cc specimen tubes taken for labs, no cytology  Estimated Blood Loss: Minimal    Findings:   Successful paracentesis.    3.0L removed, no albumin administered as patient did not want an IV placed.    Patient tolerated procedure well.      Nick Oreilly MD, MS  Radiology  PGY-1  Pager: [phone]

## 2019-05-10 ENCOUNTER — TELEPHONE (OUTPATIENT)
Dept: INTERNAL MEDICINE | Facility: CLINIC | Age: 64
End: 2019-05-10

## 2019-05-10 DIAGNOSIS — I25.10 CORONARY ARTERY DISEASE DUE TO CALCIFIED CORONARY LESION: ICD-10-CM

## 2019-05-10 DIAGNOSIS — E11.9 TYPE 2 DIABETES MELLITUS WITHOUT COMPLICATION: ICD-10-CM

## 2019-05-10 DIAGNOSIS — I25.84 CORONARY ARTERY DISEASE DUE TO CALCIFIED CORONARY LESION: ICD-10-CM

## 2019-05-10 DIAGNOSIS — R60.0 LEG EDEMA: Primary | ICD-10-CM

## 2019-05-10 NOTE — TELEPHONE ENCOUNTER
----- Message from Sophie Peterson sent at 5/10/2019 12:27 PM CDT -----  Contact: Deyan[facility] [place] [facility] [phone]  [facility] pt had a fall on 5/9/2019 needs order for skin care supplies and order for a doppler study, like to speak to the nurse.

## 2019-05-10 NOTE — TELEPHONE ENCOUNTER
Spoke to Deyanira pittman/[facility], she reported patient fell last night ; skin tear right wrist; vasaline gauze and gauze dressing applied ;   3-4+ edema to both legs (nothing new ) patient has history of heart failure .  Need an order for KERRI and doppler study (usually done in the hospital) for out patient; to be done to determine the level of compression he can tolerate.

## 2019-05-13 ENCOUNTER — HOSPITAL ENCOUNTER (OUTPATIENT)
Dept: INTERVENTIONAL RADIOLOGY/VASCULAR | Facility: HOSPITAL | Age: 64
Discharge: HOME | End: 2019-05-13
Attending: NURSE PRACTITIONER
Payer: COMMERCIAL

## 2019-05-13 DIAGNOSIS — R18.8 OTHER ASCITES: ICD-10-CM

## 2019-05-13 DIAGNOSIS — R18.8 OTHER ASCITES: Primary | ICD-10-CM

## 2019-05-13 NOTE — PROGRESS NOTES
Pt scheduled for pleurX drain placement on 5/16. Unable to perform paracentesis today. Pt to lobby no acute events.

## 2019-05-15 ENCOUNTER — TELEPHONE (OUTPATIENT)
Dept: CARDIOLOGY | Facility: CLINIC | Age: 64
End: 2019-05-15

## 2019-05-15 ENCOUNTER — TELEPHONE (OUTPATIENT)
Dept: INTERVENTIONAL RADIOLOGY/VASCULAR | Facility: HOSPITAL | Age: 64
End: 2019-05-15

## 2019-05-15 DIAGNOSIS — I21.4 NSTEMI (NON-ST ELEVATED MYOCARDIAL INFARCTION): Primary | ICD-10-CM

## 2019-05-15 RX ORDER — SODIUM CHLORIDE 9 MG/ML
500 INJECTION, SOLUTION INTRAVENOUS ONCE
Status: CANCELLED | OUTPATIENT
Start: 2019-05-15 | End: 2019-05-15

## 2019-05-15 RX ORDER — MIDAZOLAM HYDROCHLORIDE 1 MG/ML
1 INJECTION INTRAMUSCULAR; INTRAVENOUS
Status: CANCELLED | OUTPATIENT
Start: 2019-05-15

## 2019-05-15 RX ORDER — FENTANYL CITRATE 50 UG/ML
50 INJECTION, SOLUTION INTRAMUSCULAR; INTRAVENOUS
Status: CANCELLED | OUTPATIENT
Start: 2019-05-15

## 2019-05-15 NOTE — TELEPHONE ENCOUNTER
Spoke with Chrystal at the time of her call. Says that the pt is sob with exertion, sat on RA is 97-99 %, and has 3-4 + pitting edema of lower extremities. Says that the pt is scheduled for a paracentesis/PICC line placement tomorrow. Nurse says HR is in the 40's after his paracentesis is done. Says that the pt has stopped his metoprolol. HR 40-78 and BP 90/60 - 108/70. Advised Chrystal to have the pt go to the ED if in distress. Pt has an upcoming f/u with Dr. Carney on 5-30-19.

## 2019-05-15 NOTE — TELEPHONE ENCOUNTER
----- Message from Julianne Wilson MA sent at 5/15/2019  4:10 PM CDT -----  Contact: [facility] [phone]  Nurse is calling to report an 8 lb. wt. Gain. Please call.  pt. Last visit 4-4-19.

## 2019-05-15 NOTE — TELEPHONE ENCOUNTER
----- Message from Sarah Marr sent at 5/15/2019  2:40 PM CDT -----  Contact: [facility]  .Needs Advice    Reason for call: Report swelling more than last visit, severe SOB, Pt stopped taking metoprolol succinate, she comments that Pt is forgetful suggesting that Pt might be in CHF. Transfer message to triage nurse. Thanks        Communication Preference:    Additional Information:

## 2019-05-15 NOTE — TELEPHONE ENCOUNTER
Spoke with Chrystal Mariscal, nurse with [facility]. Says the Chrystal Nicholas called about the pt earlier. Please see previous note regarding the pt from today. Nurse says that the pt's paracentesis has been rescheduled to this Friday due to the pt not holding Eliquis.  Says that she weighed the pt this afternoon - weight is 172, and he has gained 8 lbs since 5-10-19 - weight today is 180.5.  Says pt is taking Lasix 40 mg daily. Spoke with Dr. Conner since Dr. Carney is out of the office today. Reviewed info from earlier message - BP/HR readings from today. Says for the pt to be assessed at the time of the paracentesis on Friday, if symptoms worsen pt to go to the ED.  Message given to Chrystal and she verbalized understanding.

## 2019-05-16 RX ORDER — FENTANYL CITRATE 50 UG/ML
50 INJECTION, SOLUTION INTRAMUSCULAR; INTRAVENOUS
Status: CANCELLED | OUTPATIENT
Start: 2019-05-16

## 2019-05-16 RX ORDER — MIDAZOLAM HYDROCHLORIDE 1 MG/ML
1 INJECTION INTRAMUSCULAR; INTRAVENOUS
Status: CANCELLED | OUTPATIENT
Start: 2019-05-16

## 2019-05-17 ENCOUNTER — HOSPITAL ENCOUNTER (OUTPATIENT)
Facility: HOSPITAL | Age: 64
Discharge: HOME | End: 2019-05-17
Attending: INTERNAL MEDICINE | Admitting: INTERNAL MEDICINE
Payer: COMMERCIAL

## 2019-05-17 ENCOUNTER — TELEPHONE (OUTPATIENT)
Dept: NEPHROLOGY | Facility: CLINIC | Age: 64
End: 2019-05-17

## 2019-05-17 VITALS
SYSTOLIC BLOOD PRESSURE: 114 MMHG | TEMPERATURE: 99 F | DIASTOLIC BLOOD PRESSURE: 54 MMHG | HEART RATE: 92 BPM | OXYGEN SATURATION: 100 % | HEIGHT: 67 IN | WEIGHT: 280 LBS | BODY MASS INDEX: 43.95 KG/M2 | RESPIRATION RATE: 18 BRPM

## 2019-05-17 DIAGNOSIS — R18.8 ASCITES: ICD-10-CM

## 2019-05-17 LAB — POCT GLUCOSE: 152 MG/DL (ref 70–110)

## 2019-05-17 PROCEDURE — 63600175 PHARM REV CODE 636 W HCPCS: Performed by: RADIOLOGY

## 2019-05-17 PROCEDURE — 25000003 PHARM REV CODE 250: Performed by: NURSE PRACTITIONER

## 2019-05-17 PROCEDURE — 82962 GLUCOSE BLOOD TEST: CPT

## 2019-05-17 PROCEDURE — 87075 CULTR BACTERIA EXCEPT BLOOD: CPT

## 2019-05-17 PROCEDURE — 87070 CULTURE OTHR SPECIMN AEROBIC: CPT

## 2019-05-17 PROCEDURE — 89051 BODY FLUID CELL COUNT: CPT

## 2019-05-17 RX ORDER — SODIUM CHLORIDE 9 MG/ML
500 INJECTION, SOLUTION INTRAVENOUS ONCE
Status: COMPLETED | OUTPATIENT
Start: 2019-05-17 | End: 2019-05-17

## 2019-05-17 RX ORDER — CEFAZOLIN SODIUM 1 G/50ML
SOLUTION INTRAVENOUS
Status: COMPLETED | OUTPATIENT
Start: 2019-05-17 | End: 2019-05-17

## 2019-05-17 RX ADMIN — SODIUM CHLORIDE 500 ML: 0.9 INJECTION, SOLUTION INTRAVENOUS at 01:05

## 2019-05-17 RX ADMIN — CEFAZOLIN SODIUM 1 G: 1 SOLUTION INTRAVENOUS at 04:05

## 2019-05-17 NOTE — H&P
Radiology History & Physical      SUBJECTIVE:     Chief Complaint: recurrent ascites.     History of Present Illness:  Jian Arrieta is a 63 y.o. male who presents for Pleurx catheter placement. Patient has CHF and some component of cirrhosis. Patient has recurrent ascites, likely not due to portal hypertension. Pt would like pleurX catheter placed.     Past Medical History:   Diagnosis Date    Alcohol abuse     Anasarca 1/28/2019    Arthropathy associated with neurological disorder 9/2/2015    Atherosclerosis     Charcot foot due to diabetes mellitus     Chronic combined systolic and diastolic heart failure 01/29/2019 1-28-19 Left VentricleModerate decreased ejection fraction at 30%. Normal left ventricular cavity size. Normal wall thickness observed. Grade I (mild) left ventricular diastolic dysfunction consistent with impaired relaxation. Normal left atrial pressure. Moderate, global hypokinesis(see wall scoring diagram). Right VentricleNormal cavity size, wall thickness and ejection fraction. Wall motion n    Chronic pancreatitis 1/28/2019    Colon polyps     approx 5 yrs ago    Coronary artery disease due to calcified coronary lesion 05/08/2015    5 stents on ASA      Diabetic polyneuropathy associated with type 2 diabetes mellitus 9/2/2015    Diverticulosis 1/28/2019    DM type 2 with diabetic peripheral neuropathy 2/4/2019    Essential hypertension 1/28/2019    Former smoker 8/26/2015    Healed ulcer of left foot on examination 6/20/2017    Hydrocele     approx 1.5 yrs ago    Hypoalbuminemia 2/4/2019    Lymphedema of both lower extremities 1/29/2019    Mixed hyperlipidemia 5/8/2015    Morbid obesity with BMI of 50.0-59.9, adult 5/8/2015    Onychomycosis of multiple toenails with type 2 diabetes mellitus and peripheral neuropathy 6/20/2017    Perianal cyst     approx 2 yrs ago    Pseudocyst of pancreas 1/28/2019 1-28-19 Liver has a cirrhotic morphology with no focal lesions.   Significant interval increase in ascites when compared to prior exam which may account for patient's abdominal distension.  Hypodense air-fluid collection along the body of the pancreas which is slightly smaller when compared to prior CT.  Findings may relate to pancreatic necrosis with pancreatic pseudocysts with infected pseudocyst    Skin cancer     skin cancer    Sleep apnea 8/26/2015    Status post bariatric surgery 1/11/2016    Type 2 diabetes mellitus, with long-term current use of insulin 5/8/2015     Past Surgical History:   Procedure Laterality Date    ANGIOGRAM, CORONARY ARTERY Right 3/20/2019    [facility] CATH LAB    ANGIOPLASTY      total x5 stents    Bypass graft study  3/20/2019    [facility] at [facility] CATH LAB    COLONOSCOPY N/A 10/6/2015    Performed by Shekhar Richards MD at [facility] ENDO (2ND FLR)    CORONARY ARTERY BYPASS GRAFT  2017    x3    CYST REMOVAL      GASTRECTOMY      GASTRECTOMY-SLEEVE-LAPAROSCOPIC - 63635 N/A 12/22/2015    Performed by Micheal Villavicencio Jr., MD at [facility] OR 2ND FLR    KNEE ARTHROSCOPY      perianal surgery      perianal cyst removed       Home Meds:   Prior to Admission medications    Medication Sig Start Date End Date Taking? Authorizing Provider   apixaban (ELIQUIS) 5 mg Tab Take 1 tablet (5 mg total) by mouth 2 (two) times daily. 3/20/19   Lorie Higuera MD   atorvastatin (LIPITOR) 40 MG tablet Take 1 tablet (40 mg total) by mouth once daily. 2/4/19 2/4/20  Alfonso Mahmood MD   clopidogrel (PLAVIX) 75 mg tablet Take 1 tablet (75 mg total) by mouth once daily. 3/21/19 3/20/20  Lorie Higuera MD   cyanocobalamin, vitamin B-12, 500 mcg Subl Place 1 tablet under the tongue every Monday.     Historical Provider, MD   furosemide (LASIX) 40 MG tablet Take 1 tablet (40 mg total) by mouth once daily. 2/4/19 2/4/20  Alfonso Mahmood MD   insulin aspart U-100 (NOVOLOG) 100 unit/mL injection Inject 4 Units into the  skin 3 (three) times daily before meals.    Historical Provider, MD   insulin detemir (LEVEMIR FLEXPEN SUBQ) Inject 12 Units into the skin once daily.     Historical Provider, MD   lipase-protease-amylase (CREON) 36,000-114,000- 180,000 unit CpDR Take 1 capsule by mouth 3 (three) times daily. 2/4/19   Alfonso Mahmood MD   metoprolol succinate (TOPROL-XL) 100 MG 24 hr tablet Take 1 tablet (100 mg total) by mouth once daily. 2/4/19 2/4/20  Alfonso Mahomod MD   omeprazole (PRILOSEC) 40 MG capsule Take 40 mg by mouth once daily. 2/18/19   Historical Provider, MD   ONETOUCH ULTRA TEST Strp 4 (four) times daily. Use to test blood sugar four times a day. 10/15/15   Historical Provider, MD   tamsulosin (FLOMAX) 0.4 mg Cap Take 1 capsule by mouth once daily. Pt has not started taking as of 2/27/19. 2/5/19   Historical Provider, MD   vitamin D (VITAMIN D3) 1000 units Tab Take 1 tablet (1,000 Units total) by mouth once daily. 1/31/19   Jian Lambert MD     Anticoagulants/Antiplatelets: Plavix.     Allergies: Review of patient's allergies indicates:  No Known Allergies  Sedation History:  no adverse reactions    Review of Systems:   Hematological: no known coagulopathies  Respiratory: no shortness of breath  Cardiovascular: no chest pain  Gastrointestinal: no abdominal pain  Genito-Urinary: no dysuria  Musculoskeletal: negative  Neurological: no TIA or stroke symptoms         OBJECTIVE:     Vital Signs (Most Recent)       Physical Exam:  ASA: 2  Mallampati: 2    General: no acute distress  Mental Status: alert and oriented to person, place and time  HEENT: normocephalic, atraumatic  Chest: unlabored breathing  Abdomen: nondistended  Extremity: moves all extremities    Laboratory  Lab Results   Component Value Date    INR 1.3 (H) 03/27/2019       Lab Results   Component Value Date    WBC 4.67 03/19/2019    HGB 9.0 (L) 03/19/2019    HCT 27.8 (L) 03/19/2019    MCV 88 03/19/2019     03/19/2019      Lab Results   Component Value  Date     (H) 04/04/2019     (L) 04/04/2019    K 5.1 04/04/2019     04/04/2019    CO2 18 (L) 04/04/2019    BUN 48 (H) 04/04/2019    CREATININE 1.9 (H) 04/04/2019    CALCIUM 7.7 (L) 04/04/2019    MG 1.6 03/15/2019    ALT 17 04/04/2019    AST 19 04/04/2019    ALBUMIN 2.3 (L) 04/04/2019    BILITOT 0.4 04/04/2019    BILIDIR 0.2 07/10/2015       ASSESSMENT/PLAN:     Sedation Plan: Local to moderate.   Patient will undergo PleurX catheter placement.   Patient is on Plavix.     Huan Mancini MD  PGY - 3  Department of Radiology

## 2019-05-17 NOTE — PROGRESS NOTES
Pt tolerates procedure without distress.  Dressing at right chest/IJ CDI. Report to [facility] RN  Transported to [facility] via stretcher in stable condition.

## 2019-05-17 NOTE — PROCEDURES
Radiology Post-Procedure Note    Pre Op Diagnosis: IV access requirement    Post Op Diagnosis: Same    Procedure: Tunneled picc placement    Procedure performed by: Trent Denny MD    Written Informed Consent Obtained: Yes  Specimen Removed: NO  Estimated Blood Loss: Minimal    Findings:   Via rt IJ a tunneled PICC was placed in the right chest. No complications. OK to use line.    Patient tolerated procedure well.    Trent Denny M.D.  Diagnostic and Interventional Radiologist  Department of Radiology  Pager: [phone]

## 2019-05-17 NOTE — PROCEDURES
Radiology Post-Procedure Note    Pre Op Diagnosis: Ascites    Post Op Diagnosis: Same    Procedure: US guided paracentesis.    Procedure performed by: Trent Denny MD    Written Informed Consent Obtained: Yes  Specimen Removed: YES ascites  Estimated Blood Loss: Minimal    Findings:   Successful RLQ paracentesis.  Albumin administered per protocol. No complications.    Patient tolerated procedure well.    Trent Denny M.D.  Diagnostic and Interventional Radiologist  Department of Radiology  Pager: [phone]

## 2019-05-17 NOTE — PROCEDURES
Radiology Post-Procedure Note    Pre Op Diagnosis: Ascites  Post Op Diagnosis: Same    Procedure: Paracentesis    Procedure performed by: Huan Mancini and Trent Denny.     Written Informed Consent Obtained: Yes  Specimen Removed: YES - serosanguinous fluid.   Estimated Blood Loss: Minimal    Findings:   Successful paracentesis.  Albumin administered PRN per protocol.    Patient tolerated procedure well.    Huan Mancini MD  PGY - 3  Department of Radiology

## 2019-05-17 NOTE — TELEPHONE ENCOUNTER
----- Message from Julianna Lam sent at 5/17/2019 10:17 AM CDT -----  Contact: [facility]   Chrystal   Ph [phone]      Pt needs  Work in      -   Last labs in Epic  Are from before pt went down hill  Has congestive heart failure -    Paratenesis      Heart  Kidney issue,    Diabetic ,  afib  And more heart issue    Please call Chrystal    Thanks

## 2019-05-17 NOTE — H&P
Radiology History & Physical      SUBJECTIVE:     Chief Complaint: recurrent ascites and need for IV access.     History of Present Illness:  Jian Arrieta is a 63 y.o. male who presents for Tunneled PICC placement and paracentesis. Pt was going to have peritoneal drain placed but spoke with Dr. Christensen and we discussed that a better option for patient would be a tunneled PICC for IV access and having a paracentesis today.       Past Medical History:   Diagnosis Date    Alcohol abuse     Anasarca 1/28/2019    Arthropathy associated with neurological disorder 9/2/2015    Atherosclerosis     Charcot foot due to diabetes mellitus     Chronic combined systolic and diastolic heart failure 01/29/2019 1-28-19 Left VentricleModerate decreased ejection fraction at 30%. Normal left ventricular cavity size. Normal wall thickness observed. Grade I (mild) left ventricular diastolic dysfunction consistent with impaired relaxation. Normal left atrial pressure. Moderate, global hypokinesis(see wall scoring diagram). Right VentricleNormal cavity size, wall thickness and ejection fraction. Wall motion n    Chronic pancreatitis 1/28/2019    Colon polyps     approx 5 yrs ago    Coronary artery disease due to calcified coronary lesion 05/08/2015    5 stents on ASA      Diabetic polyneuropathy associated with type 2 diabetes mellitus 9/2/2015    Diverticulosis 1/28/2019    DM type 2 with diabetic peripheral neuropathy 2/4/2019    Essential hypertension 1/28/2019    Former smoker 8/26/2015    Healed ulcer of left foot on examination 6/20/2017    Hydrocele     approx 1.5 yrs ago    Hypoalbuminemia 2/4/2019    Lymphedema of both lower extremities 1/29/2019    Mixed hyperlipidemia 5/8/2015    Morbid obesity with BMI of 50.0-59.9, adult 5/8/2015    Onychomycosis of multiple toenails with type 2 diabetes mellitus and peripheral neuropathy 6/20/2017    Perianal cyst     approx 2 yrs ago    Pseudocyst of pancreas  1/28/2019 1-28-19 Liver has a cirrhotic morphology with no focal lesions.  Significant interval increase in ascites when compared to prior exam which may account for patient's abdominal distension.  Hypodense air-fluid collection along the body of the pancreas which is slightly smaller when compared to prior CT.  Findings may relate to pancreatic necrosis with pancreatic pseudocysts with infected pseudocyst    Skin cancer     skin cancer    Sleep apnea 8/26/2015    Status post bariatric surgery 1/11/2016    Type 2 diabetes mellitus, with long-term current use of insulin 5/8/2015     Past Surgical History:   Procedure Laterality Date    ANGIOGRAM, CORONARY ARTERY Right 3/20/2019    Performed by [facility] at [facility] CATH LAB    ANGIOPLASTY      total x5 stents    Bypass graft study  3/20/2019    Performed by [facility] at [facility] CATH LAB    COLONOSCOPY N/A 10/6/2015    Performed by Shekhar Richards MD at [facility] ENDO (2ND FLR)    CORONARY ARTERY BYPASS GRAFT  2017    x3    CYST REMOVAL      GASTRECTOMY      GASTRECTOMY-SLEEVE-LAPAROSCOPIC - 49109 N/A 12/22/2015    Performed by Micheal Villavicencio Jr., MD at [facility] OR 2ND FLR    KNEE ARTHROSCOPY      perianal surgery      perianal cyst removed       Home Meds:   Prior to Admission medications    Medication Sig Start Date End Date Taking? Authorizing Provider   apixaban (ELIQUIS) 5 mg Tab Take 1 tablet (5 mg total) by mouth 2 (two) times daily. 3/20/19  Yes Lorie Higuera MD   atorvastatin (LIPITOR) 40 MG tablet Take 1 tablet (40 mg total) by mouth once daily. 2/4/19 2/4/20 Yes Alfonso Mahmood MD   cyanocobalamin, vitamin B-12, 500 mcg Subl Place 1 tablet under the tongue every Monday.    Yes Historical Provider, MD   insulin aspart U-100 (NOVOLOG) 100 unit/mL injection Inject 4 Units into the skin 3 (three) times daily before meals.   Yes Historical Provider, MD   insulin detemir (LEVEMIR FLEXPEN SUBQ) Inject 12 Units into the skin once  daily.    Yes Historical Provider, MD   lipase-protease-amylase (CREON) 36,000-114,000- 180,000 unit CpDR Take 1 capsule by mouth 3 (three) times daily. 2/4/19  Yes Alfonso Mahmood MD   metoprolol succinate (TOPROL-XL) 100 MG 24 hr tablet Take 1 tablet (100 mg total) by mouth once daily. 2/4/19 2/4/20 Yes Alfonso Mahmood MD   omeprazole (PRILOSEC) 40 MG capsule Take 40 mg by mouth once daily. 2/18/19  Yes Historical Provider, MD   tamsulosin (FLOMAX) 0.4 mg Cap Take 1 capsule by mouth once daily. Pt has not started taking as of 2/27/19. 2/5/19  Yes Historical Provider, MD   vitamin D (VITAMIN D3) 1000 units Tab Take 1 tablet (1,000 Units total) by mouth once daily. 1/31/19  Yes Jian Lambert MD   clopidogrel (PLAVIX) 75 mg tablet Take 1 tablet (75 mg total) by mouth once daily. 3/21/19 3/20/20  Lorie Higuera MD   furosemide (LASIX) 40 MG tablet Take 1 tablet (40 mg total) by mouth once daily. 2/4/19 2/4/20  Alfonso Mahmood MD   ONETOUCH ULTRA TEST Strp 4 (four) times daily. Use to test blood sugar four times a day. 10/15/15   Historical Provider, MD     Anticoagulants/Antiplatelets: no anticoagulation    Allergies: Review of patient's allergies indicates:  No Known Allergies  Sedation History:  no adverse reactions    Review of Systems:   Hematological: no known coagulopathies  Respiratory: no shortness of breath  Cardiovascular: no chest pain  Gastrointestinal: no abdominal pain  Genito-Urinary: no dysuria  Musculoskeletal: negative  Neurological: no TIA or stroke symptoms         OBJECTIVE:     Vital Signs (Most Recent)  Temp: 99 °F (37.2 °C) (05/17/19 1335)  Pulse: 64 (05/17/19 1335)  Resp: 16 (05/17/19 1335)  BP: (!) 116/58 (05/17/19 1335)  SpO2: 100 % (05/17/19 1335)    Physical Exam:  ASA: 3  Mallampati: 2    General: no acute distress  Mental Status: alert and oriented to person, place and time  HEENT: normocephalic, atraumatic  Chest: unlabored breathing  Abdomen: nondistended  Extremity: moves all  extremities    Laboratory  Lab Results   Component Value Date    INR 1.1 05/17/2019       Lab Results   Component Value Date    WBC 8.45 05/17/2019    HGB 8.5 (L) 05/17/2019    HCT 25.5 (L) 05/17/2019    MCV 90 05/17/2019     (H) 05/17/2019      Lab Results   Component Value Date     (H) 04/04/2019     (L) 04/04/2019    K 5.1 04/04/2019     04/04/2019    CO2 18 (L) 04/04/2019    BUN 48 (H) 04/04/2019    CREATININE 1.9 (H) 04/04/2019    CALCIUM 7.7 (L) 04/04/2019    MG 1.6 03/15/2019    ALT 17 04/04/2019    AST 19 04/04/2019    ALBUMIN 2.3 (L) 04/04/2019    BILITOT 0.4 04/04/2019    BILIDIR 0.2 07/10/2015       ASSESSMENT/PLAN:     Sedation Plan: moderate.   Patient will undergo tunneled PICC placement and paracentesis.     Huan Mancini MD  PGY - 3  Department of Radiology

## 2019-05-17 NOTE — DISCHARGE SUMMARY
Radiology Discharge Summary      Hospital Course: No complications    Admit Date: 5/17/2019  Discharge Date: 05/17/2019     Instructions Given to Patient: Yes  Diet: Resume prior diet  Activity: activity as tolerated and no driving for today    Description of Condition on Discharge: Stable  Vital Signs (Most Recent): Temp: 99 °F (37.2 °C) (05/17/19 1335)  Pulse: 92 (05/17/19 1715)  Resp: 18 (05/17/19 1715)  BP: (!) 114/54 (05/17/19 1715)  SpO2: 100 % (05/17/19 1715)    Discharge Disposition: Home    Discharge Diagnosis: Ascites. Follow up as scheduled.    Trent Denny M.D.  Diagnostic and Interventional Radiologist  Department of Radiology  Pager: [phone]

## 2019-05-18 LAB
APPEARANCE FLD: NORMAL
BODY FLD TYPE: NORMAL
COLOR FLD: NORMAL
LYMPHOCYTES NFR FLD MANUAL: 39 %
MESOTHL CELL NFR FLD MANUAL: 25 %
MONOS+MACROS NFR FLD MANUAL: 26 %
NEUTROPHILS NFR FLD MANUAL: 10 %
WBC # FLD: 134 /CU MM

## 2019-05-20 ENCOUNTER — TELEPHONE (OUTPATIENT)
Dept: INTERNAL MEDICINE | Facility: CLINIC | Age: 64
End: 2019-05-20

## 2019-05-20 DIAGNOSIS — N18.30 CKD (CHRONIC KIDNEY DISEASE) STAGE 3, GFR 30-59 ML/MIN: ICD-10-CM

## 2019-05-20 DIAGNOSIS — N17.9 AKI (ACUTE KIDNEY INJURY): Primary | ICD-10-CM

## 2019-05-20 LAB — PATH INTERP FLD-IMP: NORMAL

## 2019-05-20 NOTE — TELEPHONE ENCOUNTER
----- Message from Gini Harry sent at 5/20/2019  9:29 AM CDT -----  Contact: Chrystal with [facility] call  [phone]  Patient would like to get a referral.  Referral to what specialty:  Nephorlogy  Does the patient want the referral with a specific physician:  no  [facility]  Reason (be specific):  Chronic Kidney diease   Does the patient already have the specialty clinic appointment scheduled:  n/a  If yes, what date is the appointment scheduled:   n/a  Is the insurance listed in Epic correct? (this is important for a referral):  Yes  Comments:

## 2019-05-21 ENCOUNTER — TELEPHONE (OUTPATIENT)
Dept: INTERNAL MEDICINE | Facility: CLINIC | Age: 64
End: 2019-05-21

## 2019-05-21 LAB — BACTERIA SPEC AEROBE CULT: NO GROWTH

## 2019-05-21 NOTE — TELEPHONE ENCOUNTER
----- Message from Angela Burton sent at 5/21/2019  3:34 PM CDT -----  Contact: Pt [phone]  Pt would like a call back on how he gets his pick line flushed. He also needs ordered for a paracentesis

## 2019-05-21 NOTE — TELEPHONE ENCOUNTER
----- Message from Gini Mcdonald sent at 5/21/2019 12:19 PM CDT -----  Contact: [phone]  Requesting an order to have his pick line flushed when needed. Patient also stated he needs a verbal  order to have Paracentesis done.  Call the verbal orders to  [facility] Paracentesis phone [phone] attn:   Phone [phone].      This must happen to today-not tomorrow but today per patient.

## 2019-05-22 ENCOUNTER — HOSPITAL ENCOUNTER (OUTPATIENT)
Dept: INTERVENTIONAL RADIOLOGY/VASCULAR | Facility: HOSPITAL | Age: 64
Discharge: HOME | DRG: 292 | End: 2019-05-22
Attending: NURSE PRACTITIONER
Payer: COMMERCIAL

## 2019-05-22 ENCOUNTER — HOSPITAL ENCOUNTER (OUTPATIENT)
Dept: RADIOLOGY | Facility: HOSPITAL | Age: 64
Discharge: HOME | DRG: 292 | End: 2019-05-22
Attending: HOSPITALIST
Payer: COMMERCIAL

## 2019-05-22 ENCOUNTER — TELEPHONE (OUTPATIENT)
Dept: HEPATOLOGY | Facility: CLINIC | Age: 64
End: 2019-05-22

## 2019-05-22 ENCOUNTER — TELEPHONE (OUTPATIENT)
Dept: INTERNAL MEDICINE | Facility: CLINIC | Age: 64
End: 2019-05-22

## 2019-05-22 ENCOUNTER — HOSPITAL ENCOUNTER (OUTPATIENT)
Facility: HOSPITAL | Age: 64
Discharge: HOME HEALTH | DRG: 292 | End: 2019-05-29
Attending: EMERGENCY MEDICINE | Admitting: HOSPITALIST
Payer: COMMERCIAL

## 2019-05-22 VITALS
RESPIRATION RATE: 18 BRPM | SYSTOLIC BLOOD PRESSURE: 121 MMHG | DIASTOLIC BLOOD PRESSURE: 70 MMHG | HEART RATE: 96 BPM | OXYGEN SATURATION: 99 %

## 2019-05-22 DIAGNOSIS — K86.0 ALCOHOL-INDUCED CHRONIC PANCREATITIS: ICD-10-CM

## 2019-05-22 DIAGNOSIS — K86.3 PSEUDOCYST OF PANCREAS: ICD-10-CM

## 2019-05-22 DIAGNOSIS — I25.10 CORONARY ARTERY DISEASE DUE TO CALCIFIED CORONARY LESION: ICD-10-CM

## 2019-05-22 DIAGNOSIS — E88.09 HYPOALBUMINEMIA: ICD-10-CM

## 2019-05-22 DIAGNOSIS — N17.9 AKI (ACUTE KIDNEY INJURY): Primary | ICD-10-CM

## 2019-05-22 DIAGNOSIS — R06.02 SHORTNESS OF BREATH: ICD-10-CM

## 2019-05-22 DIAGNOSIS — R18.8 OTHER ASCITES: ICD-10-CM

## 2019-05-22 DIAGNOSIS — K86.1 OTHER CHRONIC PANCREATITIS: ICD-10-CM

## 2019-05-22 DIAGNOSIS — I50.9 CHF EXACERBATION: ICD-10-CM

## 2019-05-22 DIAGNOSIS — I25.84 CORONARY ARTERY DISEASE DUE TO CALCIFIED CORONARY LESION: ICD-10-CM

## 2019-05-22 DIAGNOSIS — I50.42 CHRONIC COMBINED SYSTOLIC AND DIASTOLIC HEART FAILURE: ICD-10-CM

## 2019-05-22 DIAGNOSIS — R60.0 LEG EDEMA: ICD-10-CM

## 2019-05-22 DIAGNOSIS — E11.42 DM TYPE 2 WITH DIABETIC PERIPHERAL NEUROPATHY: ICD-10-CM

## 2019-05-22 DIAGNOSIS — N17.8 ACUTE RENAL FAILURE WITH SPECIFIED LESION: ICD-10-CM

## 2019-05-22 DIAGNOSIS — D64.9 SYMPTOMATIC ANEMIA: Primary | ICD-10-CM

## 2019-05-22 DIAGNOSIS — N18.30 CKD (CHRONIC KIDNEY DISEASE) STAGE 3, GFR 30-59 ML/MIN: ICD-10-CM

## 2019-05-22 LAB
ABO + RH BLD: NORMAL
ALBUMIN SERPL BCP-MCNC: 1.7 G/DL (ref 3.5–5.2)
ALP SERPL-CCNC: 117 U/L (ref 55–135)
ALT SERPL W/O P-5'-P-CCNC: 19 U/L (ref 10–44)
AMMONIA PLAS-SCNC: 13 UMOL/L (ref 10–50)
ANION GAP SERPL CALC-SCNC: 5 MMOL/L (ref 8–16)
APPEARANCE FLD: NORMAL
AST SERPL-CCNC: 20 U/L (ref 10–40)
BASOPHILS # BLD AUTO: 0.01 K/UL (ref 0–0.2)
BASOPHILS # BLD AUTO: 0.01 K/UL (ref 0–0.2)
BASOPHILS NFR BLD: 0.1 % (ref 0–1.9)
BASOPHILS NFR BLD: 0.1 % (ref 0–1.9)
BILIRUB SERPL-MCNC: 0.3 MG/DL (ref 0.1–1)
BLD GP AB SCN CELLS X3 SERPL QL: NORMAL
BLD PROD TYP BPU: NORMAL
BLOOD UNIT EXPIRATION DATE: NORMAL
BLOOD UNIT TYPE CODE: 5100
BLOOD UNIT TYPE: NORMAL
BNP SERPL-MCNC: 74 PG/ML (ref 0–99)
BODY FLD TYPE: NORMAL
BUN SERPL-MCNC: 50 MG/DL (ref 8–23)
CALCIUM SERPL-MCNC: 6.8 MG/DL (ref 8.7–10.5)
CHLORIDE SERPL-SCNC: 108 MMOL/L (ref 95–110)
CO2 SERPL-SCNC: 19 MMOL/L (ref 23–29)
CODING SYSTEM: NORMAL
COLOR FLD: NORMAL
CREAT SERPL-MCNC: 2.3 MG/DL (ref 0.5–1.4)
DIFFERENTIAL METHOD: ABNORMAL
DIFFERENTIAL METHOD: ABNORMAL
DISPENSE STATUS: NORMAL
EOSINOPHIL # BLD AUTO: 0 K/UL (ref 0–0.5)
EOSINOPHIL # BLD AUTO: 0 K/UL (ref 0–0.5)
EOSINOPHIL NFR BLD: 0.3 % (ref 0–8)
EOSINOPHIL NFR BLD: 0.6 % (ref 0–8)
ERYTHROCYTE [DISTWIDTH] IN BLOOD BY AUTOMATED COUNT: 15 % (ref 11.5–14.5)
ERYTHROCYTE [DISTWIDTH] IN BLOOD BY AUTOMATED COUNT: 16 % (ref 11.5–14.5)
EST. GFR  (AFRICAN AMERICAN): 33.4 ML/MIN/1.73 M^2
EST. GFR  (NON AFRICAN AMERICAN): 28.9 ML/MIN/1.73 M^2
GLUCOSE SERPL-MCNC: 233 MG/DL (ref 70–110)
HCT VFR BLD AUTO: 22.4 % (ref 40–54)
HCT VFR BLD AUTO: 26.1 % (ref 40–54)
HGB BLD-MCNC: 7.5 G/DL (ref 14–18)
HGB BLD-MCNC: 8.8 G/DL (ref 14–18)
IMM GRANULOCYTES # BLD AUTO: 0.02 K/UL (ref 0–0.04)
IMM GRANULOCYTES # BLD AUTO: 0.02 K/UL (ref 0–0.04)
IMM GRANULOCYTES NFR BLD AUTO: 0.3 % (ref 0–0.5)
IMM GRANULOCYTES NFR BLD AUTO: 0.3 % (ref 0–0.5)
INR PPP: 1.1 (ref 0.8–1.2)
LACTATE SERPL-SCNC: 1.7 MMOL/L (ref 0.5–2.2)
LIPASE SERPL-CCNC: <3 U/L (ref 4–60)
LYMPHOCYTES # BLD AUTO: 1.4 K/UL (ref 1–4.8)
LYMPHOCYTES # BLD AUTO: 1.7 K/UL (ref 1–4.8)
LYMPHOCYTES NFR BLD: 20.3 % (ref 18–48)
LYMPHOCYTES NFR BLD: 24.3 % (ref 18–48)
LYMPHOCYTES NFR FLD MANUAL: 67 %
MAGNESIUM SERPL-MCNC: 1.7 MG/DL (ref 1.6–2.6)
MCH RBC QN AUTO: 29 PG (ref 27–31)
MCH RBC QN AUTO: 30 PG (ref 27–31)
MCHC RBC AUTO-ENTMCNC: 33.5 G/DL (ref 32–36)
MCHC RBC AUTO-ENTMCNC: 33.7 G/DL (ref 32–36)
MCV RBC AUTO: 86 FL (ref 82–98)
MCV RBC AUTO: 90 FL (ref 82–98)
MONOCYTES # BLD AUTO: 0.4 K/UL (ref 0.3–1)
MONOCYTES # BLD AUTO: 0.5 K/UL (ref 0.3–1)
MONOCYTES NFR BLD: 6.2 % (ref 4–15)
MONOCYTES NFR BLD: 6.8 % (ref 4–15)
MONOS+MACROS NFR FLD MANUAL: 29 %
NEUTROPHILS # BLD AUTO: 4.7 K/UL (ref 1.8–7.7)
NEUTROPHILS # BLD AUTO: 4.9 K/UL (ref 1.8–7.7)
NEUTROPHILS NFR BLD: 67.9 % (ref 38–73)
NEUTROPHILS NFR BLD: 72.8 % (ref 38–73)
NEUTROPHILS NFR FLD MANUAL: 4 %
NRBC BLD-RTO: 0 /100 WBC
NRBC BLD-RTO: 0 /100 WBC
PHOSPHATE SERPL-MCNC: 4.6 MG/DL (ref 2.7–4.5)
PLATELET # BLD AUTO: 215 K/UL (ref 150–350)
PLATELET # BLD AUTO: 237 K/UL (ref 150–350)
PMV BLD AUTO: 9.5 FL (ref 9.2–12.9)
PMV BLD AUTO: 9.5 FL (ref 9.2–12.9)
POTASSIUM SERPL-SCNC: 3.6 MMOL/L (ref 3.5–5.1)
PROT SERPL-MCNC: 3.9 G/DL (ref 6–8.4)
PROTHROMBIN TIME: 11.7 SEC (ref 9–12.5)
RBC # BLD AUTO: 2.5 M/UL (ref 4.6–6.2)
RBC # BLD AUTO: 3.03 M/UL (ref 4.6–6.2)
SODIUM SERPL-SCNC: 132 MMOL/L (ref 136–145)
TRANS ERYTHROCYTES VOL PATIENT: NORMAL ML
TROPONIN I SERPL DL<=0.01 NG/ML-MCNC: 0.02 NG/ML (ref 0–0.03)
WBC # BLD AUTO: 6.75 K/UL (ref 3.9–12.7)
WBC # BLD AUTO: 6.96 K/UL (ref 3.9–12.7)
WBC # FLD: 95 /CU MM

## 2019-05-22 PROCEDURE — 11000001 HC ACUTE MED/SURG PRIVATE ROOM

## 2019-05-22 PROCEDURE — 99285 PR EMERGENCY DEPT VISIT,LEVEL V: ICD-10-PCS | Mod: ,,, | Performed by: EMERGENCY MEDICINE

## 2019-05-22 PROCEDURE — P9021 RED BLOOD CELLS UNIT: HCPCS

## 2019-05-22 PROCEDURE — 93925 US ARTERIAL LOWER EXTREMITY BILAT WITH ABI (XPD): ICD-10-PCS | Mod: 26,,, | Performed by: RADIOLOGY

## 2019-05-22 PROCEDURE — 93010 EKG 12-LEAD: ICD-10-PCS | Mod: ,,, | Performed by: INTERNAL MEDICINE

## 2019-05-22 PROCEDURE — 85025 COMPLETE CBC W/AUTO DIFF WBC: CPT

## 2019-05-22 PROCEDURE — 93922 UPR/L XTREMITY ART 2 LEVELS: CPT | Mod: TC

## 2019-05-22 PROCEDURE — 49083 IR PARACENTESIS WITH IMAGING: ICD-10-PCS | Mod: ,,, | Performed by: NURSE PRACTITIONER

## 2019-05-22 PROCEDURE — G0378 HOSPITAL OBSERVATION PER HR: HCPCS

## 2019-05-22 PROCEDURE — 84100 ASSAY OF PHOSPHORUS: CPT

## 2019-05-22 PROCEDURE — 84484 ASSAY OF TROPONIN QUANT: CPT

## 2019-05-22 PROCEDURE — 86920 COMPATIBILITY TEST SPIN: CPT

## 2019-05-22 PROCEDURE — 36430 TRANSFUSION BLD/BLD COMPNT: CPT | Mod: 59

## 2019-05-22 PROCEDURE — 49083 ABD PARACENTESIS W/IMAGING: CPT

## 2019-05-22 PROCEDURE — 93925 LOWER EXTREMITY STUDY: CPT | Mod: 26,,, | Performed by: RADIOLOGY

## 2019-05-22 PROCEDURE — 93922 US ARTERIAL LOWER EXTREMITY BILAT WITH ABI (XPD): ICD-10-PCS | Mod: 26,,, | Performed by: RADIOLOGY

## 2019-05-22 PROCEDURE — 80053 COMPREHEN METABOLIC PANEL: CPT

## 2019-05-22 PROCEDURE — 83690 ASSAY OF LIPASE: CPT

## 2019-05-22 PROCEDURE — 93922 UPR/L XTREMITY ART 2 LEVELS: CPT | Mod: 26,,, | Performed by: RADIOLOGY

## 2019-05-22 PROCEDURE — 93010 ELECTROCARDIOGRAM REPORT: CPT | Mod: ,,, | Performed by: INTERNAL MEDICINE

## 2019-05-22 PROCEDURE — 49083 ABD PARACENTESIS W/IMAGING: CPT | Mod: ,,, | Performed by: NURSE PRACTITIONER

## 2019-05-22 PROCEDURE — 99285 EMERGENCY DEPT VISIT HI MDM: CPT | Mod: ,,, | Performed by: EMERGENCY MEDICINE

## 2019-05-22 PROCEDURE — 83880 ASSAY OF NATRIURETIC PEPTIDE: CPT

## 2019-05-22 PROCEDURE — P9047 ALBUMIN (HUMAN), 25%, 50ML: HCPCS | Mod: JG | Performed by: NURSE PRACTITIONER

## 2019-05-22 PROCEDURE — 99285 EMERGENCY DEPT VISIT HI MDM: CPT | Mod: 25

## 2019-05-22 PROCEDURE — 85610 PROTHROMBIN TIME: CPT

## 2019-05-22 PROCEDURE — 83605 ASSAY OF LACTIC ACID: CPT

## 2019-05-22 PROCEDURE — 82140 ASSAY OF AMMONIA: CPT

## 2019-05-22 PROCEDURE — 86850 RBC ANTIBODY SCREEN: CPT

## 2019-05-22 PROCEDURE — 89051 BODY FLUID CELL COUNT: CPT

## 2019-05-22 PROCEDURE — 83735 ASSAY OF MAGNESIUM: CPT

## 2019-05-22 PROCEDURE — 93005 ELECTROCARDIOGRAM TRACING: CPT

## 2019-05-22 PROCEDURE — 63600175 PHARM REV CODE 636 W HCPCS: Mod: JG | Performed by: NURSE PRACTITIONER

## 2019-05-22 RX ORDER — FUROSEMIDE 40 MG/1
40 TABLET ORAL DAILY
Status: DISCONTINUED | OUTPATIENT
Start: 2019-05-23 | End: 2019-05-23

## 2019-05-22 RX ORDER — INSULIN ASPART 100 [IU]/ML
0-5 INJECTION, SOLUTION INTRAVENOUS; SUBCUTANEOUS
Status: DISCONTINUED | OUTPATIENT
Start: 2019-05-22 | End: 2019-05-29 | Stop reason: HOSPADM

## 2019-05-22 RX ORDER — TAMSULOSIN HYDROCHLORIDE 0.4 MG/1
1 CAPSULE ORAL DAILY
Status: DISCONTINUED | OUTPATIENT
Start: 2019-05-23 | End: 2019-05-29 | Stop reason: HOSPADM

## 2019-05-22 RX ORDER — PANTOPRAZOLE SODIUM 40 MG/1
40 TABLET, DELAYED RELEASE ORAL DAILY
Status: DISCONTINUED | OUTPATIENT
Start: 2019-05-23 | End: 2019-05-29 | Stop reason: HOSPADM

## 2019-05-22 RX ORDER — IBUPROFEN 200 MG
24 TABLET ORAL
Status: DISCONTINUED | OUTPATIENT
Start: 2019-05-22 | End: 2019-05-29 | Stop reason: HOSPADM

## 2019-05-22 RX ORDER — METOPROLOL SUCCINATE 100 MG/1
100 TABLET, EXTENDED RELEASE ORAL DAILY
Status: DISCONTINUED | OUTPATIENT
Start: 2019-05-23 | End: 2019-05-25

## 2019-05-22 RX ORDER — GLUCAGON 1 MG
1 KIT INJECTION
Status: DISCONTINUED | OUTPATIENT
Start: 2019-05-22 | End: 2019-05-29 | Stop reason: HOSPADM

## 2019-05-22 RX ORDER — ACETAMINOPHEN 325 MG/1
650 TABLET ORAL EVERY 4 HOURS PRN
Status: DISCONTINUED | OUTPATIENT
Start: 2019-05-22 | End: 2019-05-29 | Stop reason: HOSPADM

## 2019-05-22 RX ORDER — CHOLECALCIFEROL (VITAMIN D3) 25 MCG
1000 TABLET ORAL DAILY
Status: DISCONTINUED | OUTPATIENT
Start: 2019-05-23 | End: 2019-05-29 | Stop reason: HOSPADM

## 2019-05-22 RX ORDER — ONDANSETRON 8 MG/1
8 TABLET, ORALLY DISINTEGRATING ORAL EVERY 8 HOURS PRN
Status: DISCONTINUED | OUTPATIENT
Start: 2019-05-22 | End: 2019-05-29 | Stop reason: HOSPADM

## 2019-05-22 RX ORDER — IBUPROFEN 200 MG
16 TABLET ORAL
Status: DISCONTINUED | OUTPATIENT
Start: 2019-05-22 | End: 2019-05-29 | Stop reason: HOSPADM

## 2019-05-22 RX ORDER — ATORVASTATIN CALCIUM 20 MG/1
40 TABLET, FILM COATED ORAL DAILY
Status: DISCONTINUED | OUTPATIENT
Start: 2019-05-23 | End: 2019-05-29 | Stop reason: HOSPADM

## 2019-05-22 RX ORDER — HYDROCODONE BITARTRATE AND ACETAMINOPHEN 500; 5 MG/1; MG/1
TABLET ORAL
Status: DISCONTINUED | OUTPATIENT
Start: 2019-05-22 | End: 2019-05-29 | Stop reason: HOSPADM

## 2019-05-22 RX ORDER — SODIUM CHLORIDE 0.9 % (FLUSH) 0.9 %
10 SYRINGE (ML) INJECTION
Status: DISCONTINUED | OUTPATIENT
Start: 2019-05-22 | End: 2019-05-29 | Stop reason: HOSPADM

## 2019-05-22 RX ORDER — ALBUMIN HUMAN 250 G/1000ML
25 SOLUTION INTRAVENOUS
Status: DISCONTINUED | OUTPATIENT
Start: 2019-05-22 | End: 2019-05-23 | Stop reason: HOSPADM

## 2019-05-22 RX ORDER — CLOPIDOGREL BISULFATE 75 MG/1
75 TABLET ORAL DAILY
Status: DISCONTINUED | OUTPATIENT
Start: 2019-05-23 | End: 2019-05-29 | Stop reason: HOSPADM

## 2019-05-22 RX ADMIN — ALBUMIN HUMAN 25 G: 0.25 SOLUTION INTRAVENOUS at 11:05

## 2019-05-22 NOTE — ED PROVIDER NOTES
Encounter Date: 5/22/2019    SCRIBE #1 NOTE: I, Binta Levine, am scribing for, and in the presence of,  Dr. Vigil. I have scribed the entire note.       History     Chief Complaint   Patient presents with    Shortness of Breath     with edema in BLE. Reports paracentesis today.      Time patient was seen by the provider: 3:10 PM      The patient is a 63 y.o. male with co-morbidities including alcohol abuse, type 2 diabetes, former smoker, chronic combined systolic and diastolic heart failure, CAD, chronic pancreatitis, morbid obesity, CABG who presents to the ED with a complaint of SOB. Patient had a paracentesis and lower extremity US today. He endorses SOB and intermittent b/l leg swelling x several months. He denies fever, chest pain, abdominal pain, vomiting, melena, or confusion. He is on eliquis.    The history is provided by the patient and medical records.     Review of patient's allergies indicates:  No Known Allergies  Past Medical History:   Diagnosis Date    Alcohol abuse     Anasarca 1/28/2019    Arthropathy associated with neurological disorder 9/2/2015    Atherosclerosis     Charcot foot due to diabetes mellitus     Chronic combined systolic and diastolic heart failure 01/29/2019 1-28-19 Left VentricleModerate decreased ejection fraction at 30%. Normal left ventricular cavity size. Normal wall thickness observed. Grade I (mild) left ventricular diastolic dysfunction consistent with impaired relaxation. Normal left atrial pressure. Moderate, global hypokinesis(see wall scoring diagram). Right VentricleNormal cavity size, wall thickness and ejection fraction. Wall motion n    Chronic pancreatitis 1/28/2019    Colon polyps     approx 5 yrs ago    Coronary artery disease due to calcified coronary lesion 05/08/2015    5 stents on ASA      Diabetic polyneuropathy associated with type 2 diabetes mellitus 9/2/2015    Diverticulosis 1/28/2019    DM type 2 with diabetic peripheral neuropathy  2/4/2019    Essential hypertension 1/28/2019    Former smoker 8/26/2015    Healed ulcer of left foot on examination 6/20/2017    Hydrocele     approx 1.5 yrs ago    Hypoalbuminemia 2/4/2019    Lymphedema of both lower extremities 1/29/2019    Mixed hyperlipidemia 5/8/2015    Morbid obesity with BMI of 50.0-59.9, adult 5/8/2015    Onychomycosis of multiple toenails with type 2 diabetes mellitus and peripheral neuropathy 6/20/2017    Perianal cyst     approx 2 yrs ago    Pseudocyst of pancreas 1/28/2019 1-28-19 Liver has a cirrhotic morphology with no focal lesions.  Significant interval increase in ascites when compared to prior exam which may account for patient's abdominal distension.  Hypodense air-fluid collection along the body of the pancreas which is slightly smaller when compared to prior CT.  Findings may relate to pancreatic necrosis with pancreatic pseudocysts with infected pseudocyst    Skin cancer     skin cancer    Sleep apnea 8/26/2015    Status post bariatric surgery 1/11/2016    Type 2 diabetes mellitus, with long-term current use of insulin 5/8/2015     Past Surgical History:   Procedure Laterality Date    ANGIOGRAM, CORONARY ARTERY Right 3/20/2019    [facility]formed by [facility] at [facility] CATH LAB    ANGIOPLASTY      total x5 stents    Bypass graft study  3/20/2019    Performed by [facility] at [facility] CATH LAB    COLONOSCOPY N/A 10/6/2015    Performed by Shekhar Richards MD at [facility] ENDO (2ND FLR)    CORONARY ARTERY BYPASS GRAFT  2017    x3    CYST REMOVAL      GASTRECTOMY      GASTRECTOMY-SLEEVE-LAPAROSCOPIC - 44141 N/A 12/22/2015    Performed by Micheal Villavicencio Jr., MD at [facility] OR 2ND FLR    KNEE ARTHROSCOPY      perianal surgery      perianal cyst removed    pleurx N/A 5/17/2019    Performed by [facility] Diagnostic Provider at [facility] OR 2ND FLR     Family History   Problem Relation Age of Onset    Cancer Mother     Cancer Father     Heart disease Father      Obesity Sister     Parkinsonism Brother     No Known Problems Paternal Grandmother     Cancer Paternal Grandfather     Cancer Brother     Diabetes Maternal Grandmother     Stroke Maternal Grandfather     Cirrhosis Neg Hx      Social History     Tobacco Use    Smoking status: Former Smoker     Packs/day: 2.00     Years: 30.00     Pack years: 60.00     Types: Cigarettes     Last attempt to quit: 2005     Years since quittin.3    Smokeless tobacco: Never Used   Substance Use Topics    Alcohol use: No     Comment: started ~, reports 1 shot daily, max 3 shots daily, vague about alcohol consumption. Last drink 2018    Drug use: No     Review of Systems   Constitutional: Negative for fever.   HENT: Negative for sore throat.    Respiratory: Positive for shortness of breath.    Cardiovascular: Positive for leg swelling (b/l). Negative for chest pain.   Gastrointestinal: Negative for blood in stool, nausea and vomiting.   Genitourinary: Negative for dysuria.   Musculoskeletal: Negative for back pain.   Skin: Negative for rash.   Neurological: Negative for weakness.   Hematological: Does not bruise/bleed easily.   Psychiatric/Behavioral: Negative for confusion.       Physical Exam     Initial Vitals [19 1436]   BP Pulse Resp Temp SpO2   (!) 85/58 106 18 97.4 °F (36.3 °C) 100 %      MAP       --         Physical Exam    Nursing note and vitals reviewed.  Constitutional: He appears well-developed and well-nourished. He is not diaphoretic. No distress.   63 y.o.  man, mild distress noted   HENT:   Head: Normocephalic and atraumatic.   Partial thickness avulsion noted to visceral lower lip with scant bleeding. Dentition intact. No intraoral swelling identified.   Eyes: EOM are normal. Pupils are equal, round, and reactive to light.   Neck: No tracheal deviation present.   Cardiovascular: Regular rhythm, normal heart sounds and intact distal pulses. Tachycardia present.    Pulmonary/Chest:  No stridor. No respiratory distress.   Diminished breathe sounds at b/l bases noted.   Abdominal: Soft. He exhibits no distension. There is no tenderness.   Morbidly obese. Mild post procedural tenderness noted to right lower abdominal wall without active drainage or bleeding.   Musculoskeletal: Normal range of motion.   2-3 + pitting pretibial/pedal edema   Neurological: He is alert and oriented to person, place, and time. GCS score is 15. GCS eye subscore is 4. GCS verbal subscore is 5. GCS motor subscore is 6.   Skin: Skin is warm and dry.   Psychiatric: His behavior is normal. Thought content normal.         ED Course   Procedures  Labs Reviewed   CBC W/ AUTO DIFFERENTIAL - Abnormal; Notable for the following components:       Result Value    RBC 2.50 (*)     Hemoglobin 7.5 (*)     Hematocrit 22.4 (*)     RDW 15.0 (*)     All other components within normal limits   COMPREHENSIVE METABOLIC PANEL - Abnormal; Notable for the following components:    Sodium 132 (*)     CO2 19 (*)     Glucose 233 (*)     BUN, Bld 50 (*)     Creatinine 2.3 (*)     Calcium 6.8 (*)     Total Protein 3.9 (*)     Albumin 1.7 (*)     Anion Gap 5 (*)     eGFR if  33.4 (*)     eGFR if non  28.9 (*)     All other components within normal limits   PHOSPHORUS - Abnormal; Notable for the following components:    Phosphorus 4.6 (*)     All other components within normal limits   LACTIC ACID, PLASMA   PROTIME-INR   TROPONIN I   MAGNESIUM   AMMONIA   LIPASE   B-TYPE NATRIURETIC PEPTIDE   TYPE & SCREEN     EKG Readings: (Independently Interpreted)   Rhythm: Sinus Tachycardia. Heart Rate: 101.   Left axis deviation. Occasional PVC. Normal ST segment with QT prolongation..        Imaging Results          X-Ray Chest AP Portable (Final result)  Result time 05/22/19 15:53:08    Final result by Yanni Gupta MD (05/22/19 15:53:08)                 Impression:      Please see above.      Electronically signed by: Yanni  MD Alonso  Date:    05/22/2019  Time:    15:53             Narrative:    EXAMINATION:  XR CHEST AP PORTABLE    CLINICAL HISTORY:  Shortness of breath;    TECHNIQUE:  Single frontal view of the chest was performed.    COMPARISON:  CT of the abdomen and pelvis: 03/16/2019.    Chest radiographs: 03/15/2019.  03/14/2019.    FINDINGS:  Sternal sutures are intact and aligned.  Catheter overlies the right hemithorax and mediastinum with its tip at the cephalad aspect of the right atrium.    Mediastinal structures are midline.  Cardiomediastinal contours appear unremarkable.  Pulmonary vascular distribution is unremarkable for bedside technique.    Lung volumes are normal and symmetric.  I detect no pulmonary disease, pleural fluid, pneumothorax or pneumomediastinum.    The patient has known intraperitoneal fluid.  A loop of gas-filled bowel lies beneath the anterior aspect of the right hemidiaphragm.  No pneumoperitoneum identified.    I detect no convincing evidence of significant osseous abnormality.  Degenerative changes are present at the acromioclavicular joints.                                 Medical Decision Making:   History:   Old Medical Records: I decided to obtain old medical records.  Differential Diagnosis:   Hepatic failure, symptomatic anemia, pleural effusion, CHF, lethal arhythmia  Independently Interpreted Test(s):   I have ordered and independently interpreted EKG Reading(s) - see prior notes  Clinical Tests:   Lab Tests: Reviewed and Ordered  Radiological Study: Ordered and Reviewed  Medical Tests: Reviewed and Ordered            Scribe Attestation:   Scribe #1: I performed the above scribed service and the documentation accurately describes the services I performed. I attest to the accuracy of the note.    Attending Attestation:             Attending ED Notes:   Emergency department evaluation today reveals evidence of worsening baseline anemia to a hemoglobin of 7.5 in this intravascularly  contracted patient who also exhibits evidence of moderate to severe azotemia on metabolic profile.  The patient does not exhibit any evidence of active hemorrhage or altered mental status.  Additionally, the patient does exhibit findings of anasarca/3rd spacing with lower extremity edema and recent drainage of abdominal ascites fluid.  Patient has been consented for transfusion of blood products, and he will be placed in observation with internal medicine in fair condition for further therapy and management             Clinical Impression:       ICD-10-CM ICD-9-CM   1. Symptomatic anemia D64.9 285.9   2. Shortness of breath R06.02 786.05   3. CHF exacerbation I50.9 428.0         Disposition:   Disposition: Placed in Observation  Condition: Fair                          Abdullahi Vigil MD  05/23/19 [id number]

## 2019-05-22 NOTE — DISCHARGE INSTRUCTIONS
For scheduling: Call Isabelle at [phone]    For questions or concerns call: KESHAV MON-FRI 8 AM- 5PM [phone]. Radiology resident on call [phone].    For immediate concerns that are not emergent, you may call our radiology clinic at: [phone]

## 2019-05-22 NOTE — PROGRESS NOTES
Paracentesis complete. 3600 mLs peritoneal fluid drained. Pt tolerated well. Dressing to rlq clean, dry, and intact. Albumin 25% given 100 mLs. Specimens sent per lab order. Pt given discharge information, verbalized understanding.  Pt brought to lobby area via wheelchair.

## 2019-05-22 NOTE — TELEPHONE ENCOUNTER
Received a call from Alyson with [facility] [phone] regarding care of the Picc line the patient had inserted in IR.   Alyson stated she spoke with Dr Denny and was instructed to call Dr Christensen.  Spoke with my manager Anita Dodge and was instructed to have Tia contact Nurse in IR for directions.  Message relayed to Alyson.    Received a call this morning from Chrystal with [facility] [phone]. Chrystal posing the same questions. Same message relayed.  She stated he was coming in today for 11:20 am for a Para because he was filling up with fluid. Chrystal then placed the patient's wife on the phone regarding the same issue.  Spoke with Chrystal and asked her to contact my manager, Ainta Dodge for for further clarification about the Picc Line. Telephone number given to Chrystal.

## 2019-05-22 NOTE — H&P
Radiology History & Physical      SUBJECTIVE:     Chief Complaint: abdominal distention    History of Present Illness:  Jian Arrieta is a 63 y.o. male who presents for evaluation of ascites  Past Medical History:   Diagnosis Date    Alcohol abuse     Anasarca 1/28/2019    Arthropathy associated with neurological disorder 9/2/2015    Atherosclerosis     Charcot foot due to diabetes mellitus     Chronic combined systolic and diastolic heart failure 01/29/2019 1-28-19 Left VentricleModerate decreased ejection fraction at 30%. Normal left ventricular cavity size. Normal wall thickness observed. Grade I (mild) left ventricular diastolic dysfunction consistent with impaired relaxation. Normal left atrial pressure. Moderate, global hypokinesis(see wall scoring diagram). Right VentricleNormal cavity size, wall thickness and ejection fraction. Wall motion n    Chronic pancreatitis 1/28/2019    Colon polyps     approx 5 yrs ago    Coronary artery disease due to calcified coronary lesion 05/08/2015    5 stents on ASA      Diabetic polyneuropathy associated with type 2 diabetes mellitus 9/2/2015    Diverticulosis 1/28/2019    DM type 2 with diabetic peripheral neuropathy 2/4/2019    Essential hypertension 1/28/2019    Former smoker 8/26/2015    Healed ulcer of left foot on examination 6/20/2017    Hydrocele     approx 1.5 yrs ago    Hypoalbuminemia 2/4/2019    Lymphedema of both lower extremities 1/29/2019    Mixed hyperlipidemia 5/8/2015    Morbid obesity with BMI of 50.0-59.9, adult 5/8/2015    Onychomycosis of multiple toenails with type 2 diabetes mellitus and peripheral neuropathy 6/20/2017    Perianal cyst     approx 2 yrs ago    Pseudocyst of pancreas 1/28/2019 1-28-19 Liver has a cirrhotic morphology with no focal lesions.  Significant interval increase in ascites when compared to prior exam which may account for patient's abdominal distension.  Hypodense air-fluid collection along the  body of the pancreas which is slightly smaller when compared to prior CT.  Findings may relate to pancreatic necrosis with pancreatic pseudocysts with infected pseudocyst    Skin cancer     skin cancer    Sleep apnea 8/26/2015    Status post bariatric surgery 1/11/2016    Type 2 diabetes mellitus, with long-term current use of insulin 5/8/2015     Past Surgical History:   Procedure Laterality Date    ANGIOGRAM, CORONARY ARTERY Right 3/20/2019    Performed by [facility]que [facility] at [facility] CATH LAB    ANGIOPLASTY      total x5 stents    Bypass graft study  3/20/2019    Performed by Bob Duque MD at [facility] CATH LAB    COLONOSCOPY N/A 10/6/2015    Performed by Shekhar Richards MD at [facility] ENDO (2ND FLR)    CORONARY ARTERY BYPASS GRAFT  2017    x3    CYST REMOVAL      GASTRECTOMY      GASTRECTOMY-SLEEVE-LAPAROSCOPIC - 62369 N/A 12/22/2015    Performed by Micheal Villavicencio Jr., MD at [facility] OR 2ND FLR    KNEE ARTHROSCOPY      perianal surgery      perianal cyst removed    pleurx N/A 5/17/2019    Performed by [facility] Provider at [facility] OR 2ND FLR       Home Meds:   Prior to Admission medications    Medication Sig Start Date End Date Taking? Authorizing Provider   apixaban (ELIQUIS) 5 mg Tab Take 1 tablet (5 mg total) by mouth 2 (two) times daily. 3/20/19   Lorie Higuera MD   atorvastatin (LIPITOR) 40 MG tablet Take 1 tablet (40 mg total) by mouth once daily. 2/4/19 2/4/20  Alfonso Mahmood MD   clopidogrel (PLAVIX) 75 mg tablet Take 1 tablet (75 mg total) by mouth once daily. 3/21/19 3/20/20  Lorie Higuera MD   cyanocobalamin, vitamin B-12, 500 mcg Subl Place 1 tablet under the tongue every Monday.     Historical Provider, MD   furosemide (LASIX) 40 MG tablet Take 1 tablet (40 mg total) by mouth once daily. 2/4/19 2/4/20  Alfonso Mahmood MD   insulin aspart U-100 (NOVOLOG) 100 unit/mL injection Inject 4 Units into the skin 3 (three) times daily before meals.    Historical Provider, MD   insulin  detemir (LEVEMIR FLEXPEN SUBQ) Inject 12 Units into the skin once daily.     Historical Provider, MD   lipase-protease-amylase (CREON) 36,000-114,000- 180,000 unit CpDR Take 1 capsule by mouth 3 (three) times daily. 2/4/19   Alfonso Mahmood MD   metoprolol succinate (TOPROL-XL) 100 MG 24 hr tablet Take 1 tablet (100 mg total) by mouth once daily. 2/4/19 2/4/20  Alfonso Mahmood MD   omeprazole (PRILOSEC) 40 MG capsule Take 40 mg by mouth once daily. 2/18/19   Historical Provider, MD   ONETOUCH ULTRA TEST Strp 4 (four) times daily. Use to test blood sugar four times a day. 10/15/15   Historical Provider, MD   tamsulosin (FLOMAX) 0.4 mg Cap Take 1 capsule by mouth once daily. Pt has not started taking as of 2/27/19. 2/5/19   Historical Provider, MD   vitamin D (VITAMIN D3) 1000 units Tab Take 1 tablet (1,000 Units total) by mouth once daily. 1/31/19   Jian Lambert MD     Anticoagulants/Antiplatelets: plavix and eliquis    Allergies: Review of patient's allergies indicates:  No Known Allergies  Sedation History:  no adverse reactions    Review of Systems:   Hematological: no known coagulopathies  Respiratory: no shortness of breath  Cardiovascular: no chest pain  Gastrointestinal: no abdominal pain  Genito-Urinary: no dysuria  Musculoskeletal: negative  Neurological: no TIA or stroke symptoms         OBJECTIVE:     Vital Signs (Most Recent)       Physical Exam:  ASA: 2  Mallampati: na    General: no acute distress  Mental Status: alert and oriented to person, place and time  HEENT: normocephalic, atraumatic  Chest: unlabored breathing  Heart: regular heart rate  Abdomen: distended  Extremity: moves all extremities    Laboratory  Lab Results   Component Value Date    INR 1.1 05/17/2019       Lab Results   Component Value Date    WBC 8.45 05/17/2019    HGB 8.5 (L) 05/17/2019    HCT 25.5 (L) 05/17/2019    MCV 90 05/17/2019     (H) 05/17/2019      Lab Results   Component Value Date     (H) 04/04/2019     (L)  04/04/2019    K 5.1 04/04/2019     04/04/2019    CO2 18 (L) 04/04/2019    BUN 48 (H) 04/04/2019    CREATININE 1.9 (H) 04/04/2019    CALCIUM 7.7 (L) 04/04/2019    MG 1.6 03/15/2019    ALT 17 04/04/2019    AST 19 04/04/2019    ALBUMIN 2.3 (L) 04/04/2019    BILITOT 0.4 04/04/2019    BILIDIR 0.2 07/10/2015       ASSESSMENT/PLAN:     Sedation Plan: local anesthesia  Patient will undergo US guided paracentesis

## 2019-05-22 NOTE — PROGRESS NOTES
Pt arrived to U  1 via wheelchair for paracentsis.  Name verified using two identifiers.  Allergies verified. Consent obtained.  Will continue to monitor.

## 2019-05-22 NOTE — ED NOTES
Patient identifiers verified and correct for Jian Arrieta  LOC: The patient is awake, alert and aware of environment with an appropriate affect, the patient is oriented x 3 and speaking appropriately.   APPEARANCE: Patient appears uncomfortable but in no acute distress, patient is clean and well groomed.  SKIN: The skin is warm and dry, color consistent with ethnicity, patient has normal skin turgor and moist mucus membranes, skin intact, no breakdown or bruising noted.   MUSCULOSKELETAL: Patient moving all extremities spontaneously, pt has 3+ edema to lower extremities bilaterally  RESPIRATORY: Airway is open and patent, respirations are spontaneous, patient has a normal effort and rate, no accessory muscle use noted, pt placed on continuous pulse ox with O2 sats noted at 100% on room air.  CARDIAC: Pt placed on cardiac monitor. Patient has a tachy rate and regular rhythm, no edema noted, capillary refill < 3 seconds.   GASTRO: Soft and non tender to palpation, distention noted, normoactive bowel sounds present in all four quadrants. Pt states bowel movements have been regular.  : Pt denies any pain or frequency with urination.  NEURO: Pt opens eyes spontaneously, behavior appropriate to situation, follows commands, facial expression symmetrical, bilateral hand grasp equal and even, purposeful motor response noted, normal sensation in all extremities when touched with a finger.

## 2019-05-22 NOTE — TELEPHONE ENCOUNTER
Patient was notified hepatology will need to be contacted by patient to have the paracentesis/ picc line flush. Stated he would call.

## 2019-05-23 PROBLEM — N18.30 STAGE 3 CHRONIC KIDNEY DISEASE: Status: ACTIVE | Noted: 2019-05-23

## 2019-05-23 PROBLEM — R23.9 ALTERATION IN SKIN INTEGRITY: Status: ACTIVE | Noted: 2019-05-23

## 2019-05-23 PROBLEM — I87.2 VENOUS STASIS DERMATITIS OF BOTH LOWER EXTREMITIES: Status: ACTIVE | Noted: 2019-05-23

## 2019-05-23 LAB
ALBUMIN SERPL BCP-MCNC: 1.7 G/DL (ref 3.5–5.2)
ALP SERPL-CCNC: 116 U/L (ref 55–135)
ALT SERPL W/O P-5'-P-CCNC: 21 U/L (ref 10–44)
ANION GAP SERPL CALC-SCNC: 5 MMOL/L (ref 8–16)
ASCENDING AORTA: 3.55 CM
AST SERPL-CCNC: 22 U/L (ref 10–40)
AV INDEX (PROSTH): 0.94
AV MEAN GRADIENT: 2.36 MMHG
AV PEAK GRADIENT: 4.93 MMHG
AV VALVE AREA: 3.96 CM2
AV VELOCITY RATIO: 0.69
BACTERIA #/AREA URNS AUTO: NORMAL /HPF
BASOPHILS # BLD AUTO: 0.02 K/UL (ref 0–0.2)
BASOPHILS NFR BLD: 0.3 % (ref 0–1.9)
BILIRUB SERPL-MCNC: 0.4 MG/DL (ref 0.1–1)
BILIRUB UR QL STRIP: NEGATIVE
BSA FOR ECHO PROCEDURE: 2.48 M2
BUN SERPL-MCNC: 46 MG/DL (ref 8–23)
CALCIUM SERPL-MCNC: 7.1 MG/DL (ref 8.7–10.5)
CHLORIDE SERPL-SCNC: 107 MMOL/L (ref 95–110)
CLARITY UR REFRACT.AUTO: CLEAR
CO2 SERPL-SCNC: 22 MMOL/L (ref 23–29)
COLOR UR AUTO: YELLOW
CREAT SERPL-MCNC: 2 MG/DL (ref 0.5–1.4)
CREAT UR-MCNC: 60 MG/DL (ref 23–375)
CREAT UR-MCNC: 60 MG/DL (ref 23–375)
CV ECHO LV RWT: 0.36 CM
DIFFERENTIAL METHOD: ABNORMAL
DOP CALC AO PEAK VEL: 1.11 M/S
DOP CALC AO VTI: 23.6 CM
DOP CALC LVOT AREA: 4.23 CM2
DOP CALC LVOT DIAMETER: 2.32 CM
DOP CALC LVOT PEAK VEL: 0.77 M/S
DOP CALC LVOT STROKE VOLUME: 93.38 CM3
DOP CALCLVOT PEAK VEL VTI: 22.1 CM
E WAVE DECELERATION TIME: 238.16 MSEC
E/A RATIO: 0.89
E/E' RATIO: 8.18
ECHO LV POSTERIOR WALL: 0.89 CM (ref 0.6–1.1)
EOSINOPHIL # BLD AUTO: 0.1 K/UL (ref 0–0.5)
EOSINOPHIL NFR BLD: 1 % (ref 0–8)
EOSINOPHIL URNS QL WRIGHT STN: NORMAL
ERYTHROCYTE [DISTWIDTH] IN BLOOD BY AUTOMATED COUNT: 16 % (ref 11.5–14.5)
EST. GFR  (AFRICAN AMERICAN): 39.6 ML/MIN/1.73 M^2
EST. GFR  (NON AFRICAN AMERICAN): 34.3 ML/MIN/1.73 M^2
ESTIMATED AVG GLUCOSE: 157 MG/DL (ref 68–131)
FRACTIONAL SHORTENING: 19 % (ref 28–44)
GLUCOSE SERPL-MCNC: 67 MG/DL (ref 70–110)
GLUCOSE UR QL STRIP: NEGATIVE
HBA1C MFR BLD HPLC: 7.1 % (ref 4–5.6)
HCT VFR BLD AUTO: 26 % (ref 40–54)
HGB BLD-MCNC: 8.8 G/DL (ref 14–18)
HGB UR QL STRIP: NEGATIVE
IMM GRANULOCYTES # BLD AUTO: 0.03 K/UL (ref 0–0.04)
IMM GRANULOCYTES NFR BLD AUTO: 0.5 % (ref 0–0.5)
INR PPP: 1.1 (ref 0.8–1.2)
INTERVENTRICULAR SEPTUM: 1.38 CM (ref 0.6–1.1)
KETONES UR QL STRIP: NEGATIVE
LA MAJOR: 5.6 CM
LA WIDTH: 4.44 CM
LEFT ATRIUM SIZE: 3.53 CM
LEFT INTERNAL DIMENSION IN SYSTOLE: 3.99 CM (ref 2.1–4)
LEFT VENTRICLE DIASTOLIC VOLUME INDEX: 47.9 ML/M2
LEFT VENTRICLE DIASTOLIC VOLUME: 112.85 ML
LEFT VENTRICLE MASS INDEX: 88.9 G/M2
LEFT VENTRICLE SYSTOLIC VOLUME INDEX: 29.6 ML/M2
LEFT VENTRICLE SYSTOLIC VOLUME: 69.77 ML
LEFT VENTRICULAR INTERNAL DIMENSION IN DIASTOLE: 4.9 CM (ref 3.5–6)
LEFT VENTRICULAR MASS: 209.39 G
LEUKOCYTE ESTERASE UR QL STRIP: NEGATIVE
LV LATERAL E/E' RATIO: 8.18
LV SEPTAL E/E' RATIO: 8.18
LYMPHOCYTES # BLD AUTO: 1.6 K/UL (ref 1–4.8)
LYMPHOCYTES NFR BLD: 27.7 % (ref 18–48)
MAGNESIUM SERPL-MCNC: 1.6 MG/DL (ref 1.6–2.6)
MCH RBC QN AUTO: 29.1 PG (ref 27–31)
MCHC RBC AUTO-ENTMCNC: 33.8 G/DL (ref 32–36)
MCV RBC AUTO: 86 FL (ref 82–98)
MICROSCOPIC COMMENT: NORMAL
MONOCYTES # BLD AUTO: 0.4 K/UL (ref 0.3–1)
MONOCYTES NFR BLD: 6.3 % (ref 4–15)
MV PEAK A VEL: 1.01 M/S
MV PEAK E VEL: 0.9 M/S
NEUTROPHILS # BLD AUTO: 3.8 K/UL (ref 1.8–7.7)
NEUTROPHILS NFR BLD: 64.2 % (ref 38–73)
NITRITE UR QL STRIP: NEGATIVE
NRBC BLD-RTO: 0 /100 WBC
PH UR STRIP: 5 [PH] (ref 5–8)
PHOSPHATE SERPL-MCNC: 4.3 MG/DL (ref 2.7–4.5)
PISA TR MAX VEL: 1.13 M/S
PLATELET # BLD AUTO: 212 K/UL (ref 150–350)
PMV BLD AUTO: 9.2 FL (ref 9.2–12.9)
POCT GLUCOSE: 105 MG/DL (ref 70–110)
POCT GLUCOSE: 195 MG/DL (ref 70–110)
POCT GLUCOSE: 200 MG/DL (ref 70–110)
POCT GLUCOSE: 376 MG/DL (ref 70–110)
POTASSIUM SERPL-SCNC: 3.2 MMOL/L (ref 3.5–5.1)
PREALB SERPL-MCNC: 9 MG/DL (ref 20–43)
PROT SERPL-MCNC: 4 G/DL (ref 6–8.4)
PROT UR QL STRIP: NEGATIVE
PROT UR-MCNC: <7 MG/DL (ref 0–15)
PROT/CREAT UR: NORMAL MG/G{CREAT} (ref 0–0.2)
PROTHROMBIN TIME: 11.2 SEC (ref 9–12.5)
PULM VEIN S/D RATIO: 1.32
PV PEAK D VEL: 0.37 M/S
PV PEAK S VEL: 0.49 M/S
RA MAJOR: 5.6 CM
RA WIDTH: 3.78 CM
RBC # BLD AUTO: 3.02 M/UL (ref 4.6–6.2)
RBC #/AREA URNS AUTO: 1 /HPF (ref 0–4)
RV TISSUE DOPPLER FREE WALL SYSTOLIC VELOCITY 1 (APICAL 4 CHAMBER VIEW): 14 M/S
SINUS: 3.31 CM
SODIUM SERPL-SCNC: 134 MMOL/L (ref 136–145)
SODIUM UR-SCNC: 21 MMOL/L (ref 20–250)
SP GR UR STRIP: 1.01 (ref 1–1.03)
STJ: 3.11 CM
TDI LATERAL: 0.11
TDI SEPTAL: 0.11
TDI: 0.11
TR MAX PG: 5.11 MMHG
TRICUSPID ANNULAR PLANE SYSTOLIC EXCURSION: 2.24 CM
URN SPEC COLLECT METH UR: NORMAL
UUN UR-MCNC: 588 MG/DL (ref 140–1050)
WBC # BLD AUTO: 5.89 K/UL (ref 3.9–12.7)
WBC #/AREA URNS AUTO: 1 /HPF (ref 0–5)

## 2019-05-23 PROCEDURE — 82570 ASSAY OF URINE CREATININE: CPT

## 2019-05-23 PROCEDURE — 85025 COMPLETE CBC W/AUTO DIFF WBC: CPT

## 2019-05-23 PROCEDURE — 80053 COMPREHEN METABOLIC PANEL: CPT

## 2019-05-23 PROCEDURE — G0378 HOSPITAL OBSERVATION PER HR: HCPCS

## 2019-05-23 PROCEDURE — 84134 ASSAY OF PREALBUMIN: CPT

## 2019-05-23 PROCEDURE — 81001 URINALYSIS AUTO W/SCOPE: CPT

## 2019-05-23 PROCEDURE — 83735 ASSAY OF MAGNESIUM: CPT

## 2019-05-23 PROCEDURE — 25000003 PHARM REV CODE 250: Performed by: HOSPITALIST

## 2019-05-23 PROCEDURE — 99233 SBSQ HOSP IP/OBS HIGH 50: CPT | Mod: ,,, | Performed by: INTERNAL MEDICINE

## 2019-05-23 PROCEDURE — 87205 SMEAR GRAM STAIN: CPT

## 2019-05-23 PROCEDURE — 11000001 HC ACUTE MED/SURG PRIVATE ROOM

## 2019-05-23 PROCEDURE — 99223 1ST HOSP IP/OBS HIGH 75: CPT | Mod: ,,, | Performed by: HOSPITALIST

## 2019-05-23 PROCEDURE — 85610 PROTHROMBIN TIME: CPT

## 2019-05-23 PROCEDURE — 83036 HEMOGLOBIN GLYCOSYLATED A1C: CPT

## 2019-05-23 PROCEDURE — 99233 PR SUBSEQUENT HOSPITAL CARE,LEVL III: ICD-10-PCS | Mod: ,,, | Performed by: INTERNAL MEDICINE

## 2019-05-23 PROCEDURE — 84100 ASSAY OF PHOSPHORUS: CPT

## 2019-05-23 PROCEDURE — 63600175 PHARM REV CODE 636 W HCPCS: Performed by: HOSPITALIST

## 2019-05-23 PROCEDURE — 99223 PR INITIAL HOSPITAL CARE,LEVL III: ICD-10-PCS | Mod: ,,, | Performed by: HOSPITALIST

## 2019-05-23 PROCEDURE — 84300 ASSAY OF URINE SODIUM: CPT

## 2019-05-23 PROCEDURE — 84540 ASSAY OF URINE/UREA-N: CPT

## 2019-05-23 RX ORDER — FUROSEMIDE 10 MG/ML
60 INJECTION INTRAMUSCULAR; INTRAVENOUS 3 TIMES DAILY
Status: DISCONTINUED | OUTPATIENT
Start: 2019-05-23 | End: 2019-05-23

## 2019-05-23 RX ORDER — FUROSEMIDE 10 MG/ML
60 INJECTION INTRAMUSCULAR; INTRAVENOUS 2 TIMES DAILY
Status: DISCONTINUED | OUTPATIENT
Start: 2019-05-24 | End: 2019-05-24

## 2019-05-23 RX ORDER — FUROSEMIDE 10 MG/ML
40 INJECTION INTRAMUSCULAR; INTRAVENOUS 3 TIMES DAILY
Status: DISCONTINUED | OUTPATIENT
Start: 2019-05-23 | End: 2019-05-23

## 2019-05-23 RX ADMIN — METOPROLOL SUCCINATE 100 MG: 100 TABLET, EXTENDED RELEASE ORAL at 08:05

## 2019-05-23 RX ADMIN — FUROSEMIDE 60 MG: 10 INJECTION, SOLUTION INTRAVENOUS at 04:05

## 2019-05-23 RX ADMIN — PANCRELIPASE 1 CAPSULE: 30000; 6000; 19000 CAPSULE, DELAYED RELEASE PELLETS ORAL at 05:05

## 2019-05-23 RX ADMIN — APIXABAN 5 MG: 5 TABLET, FILM COATED ORAL at 01:05

## 2019-05-23 RX ADMIN — INSULIN ASPART 3 UNITS: 100 INJECTION, SOLUTION INTRAVENOUS; SUBCUTANEOUS at 10:05

## 2019-05-23 RX ADMIN — FUROSEMIDE 60 MG: 10 INJECTION, SOLUTION INTRAVENOUS at 01:05

## 2019-05-23 RX ADMIN — POTASSIUM BICARBONATE 50 MEQ: 977.5 TABLET, EFFERVESCENT ORAL at 11:05

## 2019-05-23 RX ADMIN — APIXABAN 5 MG: 5 TABLET, FILM COATED ORAL at 09:05

## 2019-05-23 RX ADMIN — PANCRELIPASE 1 CAPSULE: 30000; 6000; 19000 CAPSULE, DELAYED RELEASE PELLETS ORAL at 11:05

## 2019-05-23 RX ADMIN — VITAMIN D, TAB 1000IU (100/BT) 1000 UNITS: 25 TAB at 08:05

## 2019-05-23 RX ADMIN — PANCRELIPASE 1 CAPSULE: 30000; 6000; 19000 CAPSULE, DELAYED RELEASE PELLETS ORAL at 08:05

## 2019-05-23 RX ADMIN — CLOPIDOGREL 75 MG: 75 TABLET, FILM COATED ORAL at 08:05

## 2019-05-23 RX ADMIN — APIXABAN 5 MG: 5 TABLET, FILM COATED ORAL at 08:05

## 2019-05-23 RX ADMIN — TAMSULOSIN HYDROCHLORIDE 0.4 MG: 0.4 CAPSULE ORAL at 08:05

## 2019-05-23 RX ADMIN — FUROSEMIDE 60 MG: 10 INJECTION, SOLUTION INTRAVENOUS at 08:05

## 2019-05-23 RX ADMIN — PANTOPRAZOLE SODIUM 40 MG: 40 TABLET, DELAYED RELEASE ORAL at 08:05

## 2019-05-23 RX ADMIN — ATORVASTATIN CALCIUM 40 MG: 20 TABLET, FILM COATED ORAL at 08:05

## 2019-05-23 NOTE — PROGRESS NOTES
"    Wound care consult for BLE    PMH: alcohol abuse, type 2 diabetes, former smoker, chronic combined systolic and diastolic heart failure, CAD, chronic pancreatitis, morbid obesity, CABG    Patient with chronic venous insuffiency to the BLEs. Patient has been wrapped with ace wraps at home by HH for wound to the left shin. Patient with +3 pitting edema to the BLE and feet. Ace wrap was wraped to mid leg only. Stressed importance of wrapping evenly from toes to below the knee.     Wound to the left shin appears to be healing well. Wound well granular with new epithelial tissue present.   Wound cleansed and hydrofera blue applied. Leg wrapped from toes to 2 fingerbreadth below the knee with kerlex and ace.   Ace wrap to the RLE replaced and applied from toes to below the knee.   Arterial studies done yesterday, no KERRI done but results showed "no significant stenosis"    Patient with oozing skin tear to the Right forearm. Wound cleansed and skin re-approximated. Hydrofera blue and light ace applied as patient had been oozing. Nurse to bedside will remove ace and place foam this afternoon.    Recommend:  Hydrofera blue qM Th to Left Leg and right forearm  BLES - wrap with ace wrap from base of toes to 2 finger breadths below the knee.     Wound care to follow PRN.  Shelby Alonso RN [facility]   [id number]         05/23/19 1155        Wound 05/23/19 1200 Venous Ulcer Shin   Date First Assessed/Time First Assessed: 05/23/19 1200   Pre-existing: Yes  Primary Wound Type: Venous Ulcer  Side: Left  Location: [place]   Wound Image    Wound WDL ex   Dressing Appearance Intact   Drainage Amount Small   Drainage Characteristics/Odor Serosanguineous   Appearance Granulating   Tissue loss description Full thickness   Red (%), Wound Tissue Color 100 %   Periwound Area Hemosiderin Staining   Wound Edges Open   Wound Length (cm) 3 cm   Wound Width (cm) 0.8 cm   Wound Depth (cm) 0.1 cm   Wound Volume (cm^3) 0.24 cm^3   Wound Surface Area " (cm^2) 2.4 cm^2   Care Cleansed with:;Sterile normal saline   Dressing Removed;Applied;Changed;Methylene blue/gentian violet;Rolled gauze;Elastic bandage   Dressing Change Due 05/27/19

## 2019-05-23 NOTE — ASSESSMENT & PLAN NOTE
Patient on toprol for rate control  Consider checking orthostatics as patient complains of weakness, light-headedness and frequent falls when he ambulates  If orthostatic + suggest dose reduction of toprol, reduction of diuretics     electronic

## 2019-05-23 NOTE — SUBJECTIVE & OBJECTIVE
Past Medical History:   Diagnosis Date    Alcohol abuse     Anasarca 1/28/2019    Arthropathy associated with neurological disorder 9/2/2015    Atherosclerosis     Charcot foot due to diabetes mellitus     Chronic combined systolic and diastolic heart failure 01/29/2019 1-28-19 Left VentricleModerate decreased ejection fraction at 30%. Normal left ventricular cavity size. Normal wall thickness observed. Grade I (mild) left ventricular diastolic dysfunction consistent with impaired relaxation. Normal left atrial pressure. Moderate, global hypokinesis(see wall scoring diagram). Right VentricleNormal cavity size, wall thickness and ejection fraction. Wall motion n    Chronic pancreatitis 1/28/2019    Colon polyps     approx 5 yrs ago    Coronary artery disease due to calcified coronary lesion 05/08/2015    5 stents on ASA      Diabetic polyneuropathy associated with type 2 diabetes mellitus 9/2/2015    Diverticulosis 1/28/2019    DM type 2 with diabetic peripheral neuropathy 2/4/2019    Essential hypertension 1/28/2019    Former smoker 8/26/2015    Healed ulcer of left foot on examination 6/20/2017    Hydrocele     approx 1.5 yrs ago    Hypoalbuminemia 2/4/2019    Lymphedema of both lower extremities 1/29/2019    Mixed hyperlipidemia 5/8/2015    Morbid obesity with BMI of 50.0-59.9, adult 5/8/2015    Onychomycosis of multiple toenails with type 2 diabetes mellitus and peripheral neuropathy 6/20/2017    Perianal cyst     approx 2 yrs ago    Pseudocyst of pancreas 1/28/2019 1-28-19 Liver has a cirrhotic morphology with no focal lesions.  Significant interval increase in ascites when compared to prior exam which may account for patient's abdominal distension.  Hypodense air-fluid collection along the body of the pancreas which is slightly smaller when compared to prior CT.  Findings may relate to pancreatic necrosis with pancreatic pseudocysts with infected pseudocyst    Skin cancer     skin  cancer    Sleep apnea 8/26/2015    Status post bariatric surgery 1/11/2016    Type 2 diabetes mellitus, with long-term current use of insulin 5/8/2015       Past Surgical History:   Procedure Laterality Date    ANGIOGRAM, CORONARY ARTERY Right 3/20/2019    Performed by [facility]que MD at [facility] CATH LAB    ANGIOPLASTY      total x5 stents    Bypass graft study  3/20/2019    Performed by Bob Duque MD at [facility] CATH LAB    COLONOSCOPY N/A 10/6/2015    Performed by Shekhar Richards MD at [facility] ENDO (2ND FLR)    CORONARY ARTERY BYPASS GRAFT  2017    x3    CYST REMOVAL      GASTRECTOMY      GASTRECTOMY-SLEEVE-LAPAROSCOPIC - 63566 N/A 12/22/2015    Performed by Micheal Villavicencio Jr., MD at [facility] OR 2ND FLR    KNEE ARTHROSCOPY      perianal surgery      perianal cyst removed    pleurx N/A 5/17/2019    Performed by [facility] Diagnostic Provider at [facility] OR 2ND FLR       Review of patient's allergies indicates:  No Known Allergies  Current Facility-Administered Medications   Medication Frequency    0.9%  NaCl infusion (for blood administration) Q24H PRN    acetaminophen tablet 650 mg Q4H PRN    apixaban tablet 5 mg BID    atorvastatin tablet 40 mg Daily    clopidogrel tablet 75 mg Daily    dextrose 50% injection 12.5 g PRN    dextrose 50% injection 25 g PRN    furosemide injection 60 mg TID    glucagon (human recombinant) injection 1 mg PRN    glucose chewable tablet 16 g PRN    glucose chewable tablet 24 g PRN    insulin aspart U-100 pen 0-5 Units QID (AC + HS) PRN    lipase-protease-amylase 6,000-19,000-30,000 units per capsule 1 capsule TID WM    metoprolol succinate (TOPROL-XL) 24 hr tablet 100 mg Daily    ondansetron disintegrating tablet 8 mg Q8H PRN    pantoprazole EC tablet 40 mg Daily    sodium chloride 0.9% flush 10 mL PRN    sodium chloride 0.9% flush 10 mL PRN    tamsulosin 24 hr capsule 0.4 mg Daily    vitamin D 1000 units tablet 1,000 Units Daily     Family History      Problem Relation (Age of Onset)    Cancer Mother, Father, Paternal Grandfather, Brother    Diabetes Maternal Grandmother    Heart disease Father    No Known Problems Paternal Grandmother    Obesity Sister    Parkinsonism Brother    Stroke Maternal Grandfather        Tobacco Use    Smoking status: Former Smoker     Packs/day: 2.00     Years: 30.00     Pack years: 60.00     Types: Cigarettes     Last attempt to quit: 2005     Years since quittin.3    Smokeless tobacco: Never Used   Substance and Sexual Activity    Alcohol use: No     Comment: started ~, reports 1 shot daily, max 3 shots daily, vague about alcohol consumption. Last drink 2018    Drug use: No    Sexual activity: Not on file     Review of Systems   Constitutional: Positive for fatigue. Negative for chills and fever.   HENT: Negative.  Negative for postnasal drip and sore throat.    Eyes: Negative.    Respiratory: Positive for shortness of breath. Negative for chest tightness.    Cardiovascular: Positive for palpitations and leg swelling. Negative for chest pain.   Gastrointestinal: Positive for abdominal distention. Negative for abdominal pain, blood in stool, constipation and nausea.   Endocrine: Negative.    Genitourinary: Negative for dysuria.   Musculoskeletal: Negative for arthralgias, back pain and joint swelling.   Skin: Negative.    Allergic/Immunologic: Negative.    Neurological: Negative.  Negative for light-headedness and headaches.   Hematological: Negative.    Psychiatric/Behavioral: Negative for confusion and dysphoric mood. The patient is not nervous/anxious and is not hyperactive.      Objective:     Vital Signs (Most Recent):  Temp: 97.9 °F (36.6 °C) (19 1203)  Pulse: 67 (19 1203)  Resp: 18 (19 1203)  BP: (!) 99/54 (19 1203)  SpO2: 99 % (19 1203)  O2 Device (Oxygen Therapy): room air (19 1203) Vital Signs (24h Range):  Temp:  [96.5 °F (35.8 °C)-98.1 °F (36.7 °C)] 97.9 °F (36.6  °C)  Pulse:  [] 67  Resp:  [13-20] 18  SpO2:  [97 %-100 %] 99 %  BP: ()/(54-81) 99/54     Weight: 130 kg (286 lb 9.6 oz) (05/23/19 0600)  Body mass index is 44.89 kg/m².  Body surface area is 2.48 meters squared.    I/O last 3 completed shifts:  In: 2095.3 [P.O.:580; Blood:1515.3]  Out: 1350 [Urine:1350]    Physical Exam   Constitutional: He appears well-developed.  Non-toxic appearance. He appears ill. No distress.   Obese male  Lying flat in bed on room air - able to speak in full sentences  Bruises about BL UE, wrapped LE BL with swelling   PICC line R chest wall   HENT:   Head: Normocephalic and atraumatic.   Mouth/Throat: Abnormal dentition.   Eyes: Pupils are equal, round, and reactive to light.   Cardiovascular: Normal rate, S1 normal, S2 normal and intact distal pulses.   Murmur heard.  Pulses:       Radial pulses are 2+ on the right side, and 2+ on the left side.        Dorsalis pedis pulses are 2+ on the right side, and 2+ on the left side.   Pulmonary/Chest: Effort normal.   Abdominal: Soft. He exhibits no distension. There is no tenderness. There is no guarding.   Musculoskeletal: He exhibits edema.   Lymphadenopathy:   Significant lymphedema BL LE   Neurological: He is alert. GCS eye subscore is 4. GCS verbal subscore is 5. GCS motor subscore is 6.   Skin: Skin is warm. There is pallor.   Nursing note and vitals reviewed.      Significant Labs:  All labs within the past 24 hours have been reviewed.    Significant Imaging:  Labs: Reviewed  ECG: Reviewed  Echo: Reviewed

## 2019-05-23 NOTE — ASSESSMENT & PLAN NOTE
Patient with abnormal Cr dating back to 3/12 with acute worsening of renal function on 3/27. Of note, patient was discharge on 3/21 the day after he received 125ml of contrast load in his angiogram. The next metabolic panel on 3/27 displayed his worsening renal function. Patient has multiple risk factors for CKD with HTN, CAD/CHF, Obesity, DM and risk factors for acute worsening with fluid shifts related to repeat paracenteses in the setting of likely prior JENNY. Patient presented on 5/22 with Cr around his new baseline at Cr 2.3 and improved overnight with administration of lasix 60mg IV once before next lab draw and pRBC.     Patient lying flat, speaking in full sentences, has significant peripheral lymphedema (chronic) but does not appear to be grossly overloaded with pulmonary edema on CXR and no rales on examination    Plan:  Suggest reduction of Lasix to 60mg IV BID  Daily CMP, Mag, Phos  Strict I's and O's  Daily weights  Avoid NSAIDS and other nephrotoxins  Renally dose all medications  Check orthostatics  Follow up on 2D echo results  US RP displayed decreased perfusion bilaterally.  Otherwise the kidneys appear unremarkable without evidence for hydronephrosis. Ascites.

## 2019-05-23 NOTE — H&P
History and Physical  Hospital Medicine       Patient Name: Jian Arrieta  MRN:  [id number]  Hospital Medicine Team: Networked reference to record PCT  Tobi Wooten MD  Date of Admission:  5/22/2019     Principal Problem:  Symptomatic anemia   Primary Care Physician: Leon Barajas DO      History of Present Illness:     The patient is a 63 y.o. male with co-morbidities including alcohol abuse, type 2 diabetes, former smoker, chronic combined systolic and diastolic heart failure, CAD, chronic pancreatitis, morbid obesity, CABG who presents to the ED with a complaint of SOB. Patient had a paracentesis and lower extremity US today. He endorses SOB and intermittent b/l leg swelling x several months. He denies fever, chest pain, abdominal pain, vomiting, melena, or confusion. He is on eliquis.    Patient states he feels like he has gained over 25 pounds of fluid.  Feels frustrated that he has not seen a nephrologist on las admission.  Had a paracentesis today with 3L removed.    Review of Systems   Constitutional: Negative for chills, fatigue, fever.   HENT: Negative for sore throat, trouble swallowing.    Eyes: Negative for photophobia, visual disturbance.   Respiratory: Negative for cough, shortness of breath.    Cardiovascular: Negative for chest pain, palpitations, leg swelling.   Gastrointestinal: Negative for abdominal pain, constipation, diarrhea, nausea, vomiting.   Endocrine: Negative for cold intolerance, heat intolerance.   Genitourinary: Negative for dysuria, frequency.   Musculoskeletal: Negative for arthralgias, myalgias.   Skin: Negative for rash, wound, erythema   Neurological: Negative for dizziness, syncope, weakness, light-headedness.   Psychiatric/Behavioral: Negative for confusion, hallucinations, anxiety  All other systems reviewed and are negative.      Past Medical History: Patient has a past medical history of Alcohol abuse, Anasarca (1/28/2019), Arthropathy associated with neurological  disorder (9/2/2015), Atherosclerosis, Charcot foot due to diabetes mellitus, Chronic combined systolic and diastolic heart failure (01/29/2019), Chronic pancreatitis (1/28/2019), Colon polyps, Coronary artery disease due to calcified coronary lesion (05/08/2015), Diabetic polyneuropathy associated with type 2 diabetes mellitus (9/2/2015), Diverticulosis (1/28/2019), DM type 2 with diabetic peripheral neuropathy (2/4/2019), Essential hypertension (1/28/2019), Former smoker (8/26/2015), Healed ulcer of left foot on examination (6/20/2017), Hydrocele, Hypoalbuminemia (2/4/2019), Lymphedema of both lower extremities (1/29/2019), Mixed hyperlipidemia (5/8/2015), Morbid obesity with BMI of 50.0-59.9, adult (5/8/2015), Onychomycosis of multiple toenails with type 2 diabetes mellitus and peripheral neuropathy (6/20/2017), Perianal cyst, Pseudocyst of pancreas (1/28/2019), Skin cancer, Sleep apnea (8/26/2015), Status post bariatric surgery (1/11/2016), and Type 2 diabetes mellitus, with long-term current use of insulin (5/8/2015).    Past Surgical History: Patient has a past surgical history that includes Angioplasty; Cyst Removal; perianal surgery; Colonoscopy (N/A, 10/6/2015); Knee arthroscopy; Gastrectomy; Coronary artery bypass graft (2017); Coronary angiography (Right, 3/20/2019); Coronary bypass graft angiography (3/20/2019); and Insertion of dialysis catheter (N/A, 5/17/2019).    Social History: Patient reports that he quit smoking about 14 years ago. His smoking use included cigarettes. He has a 60.00 pack-year smoking history. He has never used smokeless tobacco. He reports that he does not drink alcohol or use drugs.    Family History: family history includes Cancer in his brother, father, mother, and paternal grandfather; Diabetes in his maternal grandmother; Heart disease in his father; No Known Problems in his paternal grandmother; Obesity in his sister; Parkinsonism in his brother; Stroke in his maternal  grandfather.    Medications: Scheduled Meds:   apixaban  5 mg Oral BID    atorvastatin  40 mg Oral Daily    clopidogrel  75 mg Oral Daily    furosemide  40 mg Intravenous TID    metoprolol succinate  100 mg Oral Daily    pantoprazole  40 mg Oral Daily    tamsulosin  1 capsule Oral Daily    vitamin D  1,000 Units Oral Daily     Continuous Infusions:  PRN Meds:.sodium chloride, acetaminophen, dextrose 50%, dextrose 50%, glucagon (human recombinant), glucose, glucose, insulin aspart U-100, ondansetron, sodium chloride 0.9%, sodium chloride 0.9%    Allergies: Patient has No Known Allergies.    Physical Exam:     Vital Signs (Most Recent):  Temp: 97.7 °F (36.5 °C) (05/22/19 2000)  Pulse: 82 (05/22/19 2326)  Resp: 18 (05/22/19 2000)  BP: 108/60 (05/22/19 2000)  SpO2: 100 % (05/22/19 1947) Vital Signs Range (Last 24H):  Temp:  [97.4 °F (36.3 °C)-98.1 °F (36.7 °C)]   Pulse:  []   Resp:  [13-20]   BP: ()/(57-81)   SpO2:  [96 %-100 %]    Body mass index is 44.95 kg/m².     Physical Exam:  Constitutional: Appears well-developed and well-nourished.   Head: Normocephalic and atraumatic.   Mouth/Throat: Oropharynx is clear and moist.   Eyes: EOM are normal. Pupils are equal, round, and reactive to light. No scleral icterus.   Neck: Normal range of motion. Neck supple.   Cardiovascular: Normal rate and regular rhythm.  No murmur heard.  Pulmonary/Chest: Effort normal and breath sounds normal. No respiratory distress. No wheezes, rales, or rhonchi  Abdominal: Soft. Bowel sounds are normal.  No distension or tenderness  Musculoskeletal: Normal range of motion. No edema.   Neurological: Alert and oriented to person, place, and time.   Skin: Skin is warm and dry.   Psychiatric: Normal mood and affect. Behavior is normal.   Vitals reviewed.    Recent Labs   Lab 05/17/19  1231 05/22/19  1531 05/22/19  2153   WBC 8.45 6.75 6.96   HGB 8.5* 7.5* 8.8*   HCT 25.5* 22.4* 26.1*   * 237 215       Recent Labs   Lab  05/22/19  1531   *   K 3.6      CO2 19*   BUN 50*   CREATININE 2.3*   *   CALCIUM 6.8*   MG 1.7   PHOS 4.6*   LIPASE <3*     Recent Labs   Lab 05/17/19  1231 05/22/19  1531   ALKPHOS  --  117   ALT  --  19   AST  --  20   ALBUMIN  --  1.7*   PROT  --  3.9*   BILITOT  --  0.3   INR 1.1 1.1      Recent Labs   Lab 05/17/19  1339   POCTGLUCOSE 152*         Assessment and Plan:     Mr. Jian Arrieta is a 63 y.o. male who presented to [facility] on 5/22/2019 with     Active Hospital Problems    Diagnosis  POA    *Symptomatic anemia [D64.9]  Yes    Hypertension associated with diabetes [E11.59, I10]  Yes    Paroxysmal atrial fibrillation [I48.0]  Yes    Ascites [R18.8]  Yes    DM type 2 with diabetic peripheral neuropathy [E11.42]  Yes    Acute on chronic combined systolic and diastolic heart failure [I50.43]  Yes     1-28-19  Left Ventricle Moderate decreased ejection fraction at 30%. Normal left ventricular cavity size. Normal wall thickness observed. Grade I (mild) left ventricular diastolic dysfunction consistent with impaired relaxation. Normal left atrial pressure. Moderate, global hypokinesis(see wall scoring diagram).   Right Ventricle Normal cavity size, wall thickness and ejection fraction. Wall motion normal.   Left Atrium Normal atrial size. .   Right Atrium Normal atrial size.   Aortic Valve The valve is trileaflet. Aortic valve sclerosis is mild. No stenosis. No regurgitation. Mobility is normal   Mitral Valve Normal valve structure. No stenosis. No regurgitation. Normal leaflet mobility.   Tricuspid Valve Tricuspid valve not well visualized. Trace regurgitation.   Pulmonic Valve The pulmonic valve was not well visualized.   IVC/SVC Inferior vena cava is not well visualized.   Ascending Aorta The aortic root and ascending aorta are normal in size.   Pericardium No pericardial effusion.           Anasarca [R60.1]  Yes    Chronic pancreatitis [K86.1]  Yes    Morbid obesity with BMI of  50.0-59.9, adult [E66.01, Z68.43]  Not Applicable      Resolved Hospital Problems   No resolved problems to display.       # Symptomatic Anemia  - no evidence of GI bleed  - transfusing 1 unit of PRBC      # Acute on chronic systolic and diastolic heart failure  - recent echo reviewed  - lasix 60 mg IV TID  - daily weights  - strict I/Os    # Ascites  - its been unclear if this has been from liver disease of cardiac disease   - patient had a recent liver biopsy negative for cirrhosis  - s/p paracentesis today as outpatient with 3L removed;     # Essential HTN   - cont home BP meds    # DM2 with HTN and neuropathy and CKD stage 3  - Hba1c  - SSI; on home insulin     # Paroxysmal atrial fibrillation  - cont toprol and eliquis    # Morbid Obesity  Body mass index is 44.95 kg/m².    # LE Venous stasis ulcers  - wound care consult    # ELIEZER on CKD stage 3  - urine lytes and nephrology consult as per patient request; renal U/S    # Chronic pancreatitis  - cont creon  - past h/o alcohol abuse       Diet:  diabetes  GI PPx:    DVT PPx:    Goals of Care:  full       Disposition:  After several days of IV diuresis     Tobi Wooten MD  Medical Director [facility]  Spectra:  [phone]  Pager: [phone]

## 2019-05-23 NOTE — ED NOTES
Patient resting in bed inquired about a diet and eating, nurse informed patient that she will have to speak with physician because there is no diet in orders.  Nurse called Dr. Hazel he states he will review chart when able and add appropriate orders.

## 2019-05-23 NOTE — CONSULTS
[facility]  Nephrology  Consult Note    Patient Name: Jian Arrieta  MRN: [id number]  Admission Date: 5/22/2019  Hospital Length of Stay: 1 days  Attending Provider: Roly Caba MD   Primary Care Physician: Leon Barajas DO  Principal Problem:Symptomatic anemia    Inpatient consult to Nephrology  Consult performed by: Leon Boston MD  Consult ordered by: Tobi Wooten MD        Subjective:     HPI: Mr. Jian Arrieta is a 63 year old male with CAD (s/p PCI with 5 stents) with subsequent CABG x 3v (2017 at ), chronic combined systolic and diastolic heart failure, HLD, DM2 on insulin, BERHANE on CPAP, chronic pancreatitis with pseudocyst being admitted to observation for shortness of breath. Patient had a paracentesis and lower extremity US 5/23. He endorses SOB and intermittent b/l leg swelling x several months. He denies fever, chest pain, abdominal pain, vomiting, melena, or confusion. He is on eliquis.Patient states he feels like he has gained over 25 pounds of fluid.  Feels frustrated that he has not seen a nephrologist during last admission.  Had a paracentesis 5/23 with 3.6L removed.   Nephrology was consulted out of concern for ELIEZER on CKDIII and volume overload.     Of note, patient was hospitalized in March 2019 at which point he was evaluated for cirrhosis. US liver displayed cirrhosis and concern for portal hypertension. He underwent 3 large volume paracentesis during this hospitalization and underwent liver biopsy with transjugular pressure gradient was 2mm hg and biopsy was not consistent with cirrhosis. Additionally, ascitic fluid studies have been consistent with high SAAG and high protein more in line with heart failure.     Past Medical History:   Diagnosis Date    Alcohol abuse     Anasarca 1/28/2019    Arthropathy associated with neurological disorder 9/2/2015    Atherosclerosis     Charcot foot due to diabetes mellitus     Chronic combined systolic and diastolic  heart failure 01/29/2019 1-28-19 Left VentricleModerate decreased ejection fraction at 30%. Normal left ventricular cavity size. Normal wall thickness observed. Grade I (mild) left ventricular diastolic dysfunction consistent with impaired relaxation. Normal left atrial pressure. Moderate, global hypokinesis(see wall scoring diagram). Right VentricleNormal cavity size, wall thickness and ejection fraction. Wall motion n    Chronic pancreatitis 1/28/2019    Colon polyps     approx 5 yrs ago    Coronary artery disease due to calcified coronary lesion 05/08/2015    5 stents on ASA      Diabetic polyneuropathy associated with type 2 diabetes mellitus 9/2/2015    Diverticulosis 1/28/2019    DM type 2 with diabetic peripheral neuropathy 2/4/2019    Essential hypertension 1/28/2019    Former smoker 8/26/2015    Healed ulcer of left foot on examination 6/20/2017    Hydrocele     approx 1.5 yrs ago    Hypoalbuminemia 2/4/2019    Lymphedema of both lower extremities 1/29/2019    Mixed hyperlipidemia 5/8/2015    Morbid obesity with BMI of 50.0-59.9, adult 5/8/2015    Onychomycosis of multiple toenails with type 2 diabetes mellitus and peripheral neuropathy 6/20/2017    Perianal cyst     approx 2 yrs ago    Pseudocyst of pancreas 1/28/2019 1-28-19 Liver has a cirrhotic morphology with no focal lesions.  Significant interval increase in ascites when compared to prior exam which may account for patient's abdominal distension.  Hypodense air-fluid collection along the body of the pancreas which is slightly smaller when compared to prior CT.  Findings may relate to pancreatic necrosis with pancreatic pseudocysts with infected pseudocyst    Skin cancer     skin cancer    Sleep apnea 8/26/2015    Status post bariatric surgery 1/11/2016    Type 2 diabetes mellitus, with long-term current use of insulin 5/8/2015       Past Surgical History:   Procedure Laterality Date    ANGIOGRAM, CORONARY ARTERY Right  3/20/2019    Performed by [facility]que [facility] at [facility] CATH LAB    ANGIOPLASTY      total x5 stents    Bypass graft study  3/20/2019    Performed by Bob Duque MD at [facility] CATH LAB    COLONOSCOPY N/A 10/6/2015    Performed by Shekhar Richards MD at [facility] ENDO (2ND FLR)    CORONARY ARTERY BYPASS GRAFT  2017    x3    CYST REMOVAL      GASTRECTOMY      GASTRECTOMY-SLEEVE-LAPAROSCOPIC - 51844 N/A 12/22/2015    Performed by Micheal Villavicencio Jr., MD at [facility] OR 2ND FLR    KNEE ARTHROSCOPY      perianal surgery      perianal cyst removed    pleurx N/A 5/17/2019    Performed by [facility] Provider at [facility] OR 2ND FLR       Review of patient's allergies indicates:  No Known Allergies  Current Facility-Administered Medications   Medication Frequency    0.9%  NaCl infusion (for blood administration) Q24H PRN    acetaminophen tablet 650 mg Q4H PRN    apixaban tablet 5 mg BID    atorvastatin tablet 40 mg Daily    clopidogrel tablet 75 mg Daily    dextrose 50% injection 12.5 g PRN    dextrose 50% injection 25 g PRN    furosemide injection 60 mg TID    glucagon (human recombinant) injection 1 mg PRN    glucose chewable tablet 16 g PRN    glucose chewable tablet 24 g PRN    insulin aspart U-100 pen 0-5 Units QID (AC + HS) PRN    lipase-protease-amylase 6,000-19,000-30,000 units per capsule 1 capsule TID WM    metoprolol succinate (TOPROL-XL) 24 hr tablet 100 mg Daily    ondansetron disintegrating tablet 8 mg Q8H PRN    pantoprazole EC tablet 40 mg Daily    sodium chloride 0.9% flush 10 mL PRN    sodium chloride 0.9% flush 10 mL PRN    tamsulosin 24 hr capsule 0.4 mg Daily    vitamin D 1000 units tablet 1,000 Units Daily     Family History     Problem Relation (Age of Onset)    Cancer Mother, Father, Paternal Grandfather, Brother    Diabetes Maternal Grandmother    Heart disease Father    No Known Problems Paternal Grandmother    Obesity Sister    Parkinsonism Brother    Stroke Maternal  Grandfather        Tobacco Use    Smoking status: Former Smoker     Packs/day: 2.00     Years: 30.00     Pack years: 60.00     Types: Cigarettes     Last attempt to quit: 2005     Years since quittin.3    Smokeless tobacco: Never Used   Substance and Sexual Activity    Alcohol use: No     Comment: started ~, reports 1 shot daily, max 3 shots daily, vague about alcohol consumption. Last drink 2018    Drug use: No    Sexual activity: Not on file     Review of Systems   Constitutional: Positive for fatigue. Negative for chills and fever.   HENT: Negative.  Negative for postnasal drip and sore throat.    Eyes: Negative.    Respiratory: Positive for shortness of breath. Negative for chest tightness.    Cardiovascular: Positive for palpitations and leg swelling. Negative for chest pain.   Gastrointestinal: Positive for abdominal distention. Negative for abdominal pain, blood in stool, constipation and nausea.   Endocrine: Negative.    Genitourinary: Negative for dysuria.   Musculoskeletal: Negative for arthralgias, back pain and joint swelling.   Skin: Negative.    Allergic/Immunologic: Negative.    Neurological: Negative.  Negative for light-headedness and headaches.   Hematological: Negative.    Psychiatric/Behavioral: Negative for confusion and dysphoric mood. The patient is not nervous/anxious and is not hyperactive.      Objective:     Vital Signs (Most Recent):  Temp: 97.9 °F (36.6 °C) (19 1203)  Pulse: 67 (19 1203)  Resp: 18 (19 1203)  BP: (!) 99/54 (19 1203)  SpO2: 99 % (19 1203)  O2 Device (Oxygen Therapy): room air (19 1203) Vital Signs (24h Range):  Temp:  [96.5 °F (35.8 °C)-98.1 °F (36.7 °C)] 97.9 °F (36.6 °C)  Pulse:  [] 67  Resp:  [13-20] 18  SpO2:  [97 %-100 %] 99 %  BP: ()/(54-81) 99/54     Weight: 130 kg (286 lb 9.6 oz) (19 0600)  Body mass index is 44.89 kg/m².  Body surface area is 2.48 meters squared.    I/O last 3 completed  shifts:  In: 2095.3 [P.O.:580; Blood:1515.3]  Out: 1350 [Urine:1350]    Physical Exam   Constitutional: He appears well-developed.  Non-toxic appearance. He appears ill. No distress.   Obese male  Lying flat in bed on room air - able to speak in full sentences  Bruises about BL UE, wrapped LE BL with swelling   PICC line R chest wall   HENT:   Head: Normocephalic and atraumatic.   Mouth/Throat: Abnormal dentition.   Eyes: Pupils are equal, round, and reactive to light.   Cardiovascular: Normal rate, S1 normal, S2 normal and intact distal pulses.   Murmur heard.  Pulses:       Radial pulses are 2+ on the right side, and 2+ on the left side.        Dorsalis pedis pulses are 2+ on the right side, and 2+ on the left side.   Pulmonary/Chest: Effort normal.   Abdominal: Soft. He exhibits no distension. There is no tenderness. There is no guarding.   Musculoskeletal: He exhibits edema.   Lymphadenopathy:   Significant lymphedema BL LE   Neurological: He is alert. GCS eye subscore is 4. GCS verbal subscore is 5. GCS motor subscore is 6.   Skin: Skin is warm. There is pallor.   Nursing note and vitals reviewed.      Significant Labs:  All labs within the past 24 hours have been reviewed.    Significant Imaging:  Labs: Reviewed  ECG: Reviewed  Echo: Reviewed    Assessment/Plan:     Stage 3 chronic kidney disease  Patient with abnormal Cr dating back to 3/12 with acute worsening of renal function on 3/27. Of note, patient was discharge on 3/21 the day after he received 125ml of contrast load in his angiogram. The next metabolic panel on 3/27 displayed his worsening renal function. Patient has multiple risk factors for CKD with HTN, CAD/CHF, Obesity, DM and risk factors for acute worsening with fluid shifts related to repeat paracenteses in the setting of likely prior JENNY. Patient presented on 5/22 with Cr around his new baseline at Cr 2.3 and improved overnight with administration of lasix 60mg IV once before next lab draw and  pRBC.     Patient lying flat, speaking in full sentences, has significant peripheral lymphedema (chronic) but does not appear to be grossly overloaded with pulmonary edema on CXR and no rales on examination    Plan:  Suggest reduction of Lasix to 60mg IV BID  Daily CMP, Mag, Phos  Strict I's and O's  Daily weights  Avoid NSAIDS and other nephrotoxins  Renally dose all medications  Check orthostatics  Follow up on 2D echo results  US RP displayed decreased perfusion bilaterally.  Otherwise the kidneys appear unremarkable without evidence for hydronephrosis. Ascites.    Acute on chronic combined systolic and diastolic heart failure  Patient on toprol for rate control  Consider checking orthostatics as patient complains of weakness, light-headedness and frequent falls when he ambulates  If orthostatic + suggest dose reduction of toprol, reduction of diuretics          Thank you for your consult. I will follow-up with patient. Please contact us if you have any additional questions.    Leon Boston MD  Nephrology  [facility]

## 2019-05-23 NOTE — HPI
Mr. Jian Arrieta is a 63 year old male with CAD (s/p PCI with 5 stents) with subsequent CABG x 3v (2017 at ), chronic combined systolic and diastolic heart failure, HLD, DM2 on insulin, BERHANE on CPAP, chronic pancreatitis with pseudocyst being admitted to observation for shortness of breath. Patient had a paracentesis and lower extremity US 5/23. He endorses SOB and intermittent b/l leg swelling x several months. He denies fever, chest pain, abdominal pain, vomiting, melena, or confusion. He is on eliquis.Patient states he feels like he has gained over 25 pounds of fluid.  Feels frustrated that he has not seen a nephrologist during last admission.  Had a paracentesis 5/23 with 3.6L removed.   Nephrology was consulted out of concern for ELIEZER on CKDIII and volume overload.     Of note, patient was hospitalized in March 2019 at which point he was evaluated for cirrhosis. US liver displayed cirrhosis and concern for portal hypertension. He underwent 3 large volume paracentesis during this hospitalization and underwent liver biopsy with transjugular pressure gradient was 2mm hg and biopsy was not consistent with cirrhosis. Additionally, ascitic fluid studies have been consistent with high SAAG and high protein more in line with heart failure.

## 2019-05-24 LAB
ABO + RH BLD: NORMAL
ALBUMIN SERPL BCP-MCNC: 1.4 G/DL (ref 3.5–5.2)
ALP SERPL-CCNC: 121 U/L (ref 55–135)
ALT SERPL W/O P-5'-P-CCNC: 25 U/L (ref 10–44)
ANION GAP SERPL CALC-SCNC: 5 MMOL/L (ref 8–16)
AST SERPL-CCNC: 33 U/L (ref 10–40)
BASOPHILS # BLD AUTO: 0.03 K/UL (ref 0–0.2)
BASOPHILS NFR BLD: 0.5 % (ref 0–1.9)
BILIRUB SERPL-MCNC: 0.4 MG/DL (ref 0.1–1)
BLD GP AB SCN CELLS X3 SERPL QL: NORMAL
BUN SERPL-MCNC: 43 MG/DL (ref 8–23)
CALCIUM SERPL-MCNC: 6.9 MG/DL (ref 8.7–10.5)
CHLORIDE SERPL-SCNC: 107 MMOL/L (ref 95–110)
CO2 SERPL-SCNC: 23 MMOL/L (ref 23–29)
CREAT SERPL-MCNC: 1.9 MG/DL (ref 0.5–1.4)
DIFFERENTIAL METHOD: ABNORMAL
EOSINOPHIL # BLD AUTO: 0.1 K/UL (ref 0–0.5)
EOSINOPHIL NFR BLD: 1.7 % (ref 0–8)
ERYTHROCYTE [DISTWIDTH] IN BLOOD BY AUTOMATED COUNT: 16 % (ref 11.5–14.5)
EST. GFR  (AFRICAN AMERICAN): 42.1 ML/MIN/1.73 M^2
EST. GFR  (NON AFRICAN AMERICAN): 36.4 ML/MIN/1.73 M^2
GLUCOSE SERPL-MCNC: 120 MG/DL (ref 70–110)
HCT VFR BLD AUTO: 26.1 % (ref 40–54)
HGB BLD-MCNC: 8.6 G/DL (ref 14–18)
IMM GRANULOCYTES # BLD AUTO: 0.02 K/UL (ref 0–0.04)
IMM GRANULOCYTES NFR BLD AUTO: 0.3 % (ref 0–0.5)
INR PPP: 1.2 (ref 0.8–1.2)
LYMPHOCYTES # BLD AUTO: 1.6 K/UL (ref 1–4.8)
LYMPHOCYTES NFR BLD: 25.1 % (ref 18–48)
MAGNESIUM SERPL-MCNC: 1.5 MG/DL (ref 1.6–2.6)
MCH RBC QN AUTO: 29.1 PG (ref 27–31)
MCHC RBC AUTO-ENTMCNC: 33 G/DL (ref 32–36)
MCV RBC AUTO: 88 FL (ref 82–98)
MONOCYTES # BLD AUTO: 0.4 K/UL (ref 0.3–1)
MONOCYTES NFR BLD: 6.2 % (ref 4–15)
NEUTROPHILS # BLD AUTO: 4.2 K/UL (ref 1.8–7.7)
NEUTROPHILS NFR BLD: 66.2 % (ref 38–73)
NRBC BLD-RTO: 0 /100 WBC
PHOSPHATE SERPL-MCNC: 3.8 MG/DL (ref 2.7–4.5)
PLATELET # BLD AUTO: 223 K/UL (ref 150–350)
PMV BLD AUTO: 9.6 FL (ref 9.2–12.9)
POCT GLUCOSE: 118 MG/DL (ref 70–110)
POCT GLUCOSE: 180 MG/DL (ref 70–110)
POCT GLUCOSE: 222 MG/DL (ref 70–110)
POCT GLUCOSE: 235 MG/DL (ref 70–110)
POTASSIUM SERPL-SCNC: 3.2 MMOL/L (ref 3.5–5.1)
PROT SERPL-MCNC: 3.5 G/DL (ref 6–8.4)
PROTHROMBIN TIME: 11.8 SEC (ref 9–12.5)
RBC # BLD AUTO: 2.96 M/UL (ref 4.6–6.2)
SODIUM SERPL-SCNC: 135 MMOL/L (ref 136–145)
WBC # BLD AUTO: 6.3 K/UL (ref 3.9–12.7)

## 2019-05-24 PROCEDURE — 85610 PROTHROMBIN TIME: CPT

## 2019-05-24 PROCEDURE — 97165 OT EVAL LOW COMPLEX 30 MIN: CPT

## 2019-05-24 PROCEDURE — G0378 HOSPITAL OBSERVATION PER HR: HCPCS

## 2019-05-24 PROCEDURE — 99232 SBSQ HOSP IP/OBS MODERATE 35: CPT | Mod: ,,, | Performed by: INTERNAL MEDICINE

## 2019-05-24 PROCEDURE — 84100 ASSAY OF PHOSPHORUS: CPT

## 2019-05-24 PROCEDURE — 99233 PR SUBSEQUENT HOSPITAL CARE,LEVL III: ICD-10-PCS | Mod: ,,, | Performed by: HOSPITALIST

## 2019-05-24 PROCEDURE — 11000001 HC ACUTE MED/SURG PRIVATE ROOM

## 2019-05-24 PROCEDURE — 97161 PT EVAL LOW COMPLEX 20 MIN: CPT

## 2019-05-24 PROCEDURE — 85025 COMPLETE CBC W/AUTO DIFF WBC: CPT

## 2019-05-24 PROCEDURE — 83735 ASSAY OF MAGNESIUM: CPT

## 2019-05-24 PROCEDURE — 86901 BLOOD TYPING SEROLOGIC RH(D): CPT

## 2019-05-24 PROCEDURE — 36415 COLL VENOUS BLD VENIPUNCTURE: CPT

## 2019-05-24 PROCEDURE — 25000003 PHARM REV CODE 250: Performed by: PHYSICIAN ASSISTANT

## 2019-05-24 PROCEDURE — 97530 THERAPEUTIC ACTIVITIES: CPT

## 2019-05-24 PROCEDURE — 25000003 PHARM REV CODE 250: Performed by: HOSPITALIST

## 2019-05-24 PROCEDURE — 80053 COMPREHEN METABOLIC PANEL: CPT

## 2019-05-24 PROCEDURE — 63600175 PHARM REV CODE 636 W HCPCS: Mod: JG | Performed by: HOSPITALIST

## 2019-05-24 PROCEDURE — P9047 ALBUMIN (HUMAN), 25%, 50ML: HCPCS | Mod: JG | Performed by: HOSPITALIST

## 2019-05-24 PROCEDURE — 99232 PR SUBSEQUENT HOSPITAL CARE,LEVL II: ICD-10-PCS | Mod: ,,, | Performed by: INTERNAL MEDICINE

## 2019-05-24 PROCEDURE — 99233 SBSQ HOSP IP/OBS HIGH 50: CPT | Mod: ,,, | Performed by: HOSPITALIST

## 2019-05-24 RX ORDER — TORSEMIDE 20 MG/1
20 TABLET ORAL 2 TIMES DAILY
Status: DISCONTINUED | OUTPATIENT
Start: 2019-05-24 | End: 2019-05-27

## 2019-05-24 RX ORDER — ALBUMIN HUMAN 250 G/1000ML
25 SOLUTION INTRAVENOUS ONCE
Status: COMPLETED | OUTPATIENT
Start: 2019-05-24 | End: 2019-05-24

## 2019-05-24 RX ADMIN — INSULIN ASPART 1 UNITS: 100 INJECTION, SOLUTION INTRAVENOUS; SUBCUTANEOUS at 08:05

## 2019-05-24 RX ADMIN — PANTOPRAZOLE SODIUM 40 MG: 40 TABLET, DELAYED RELEASE ORAL at 08:05

## 2019-05-24 RX ADMIN — METOPROLOL SUCCINATE 100 MG: 100 TABLET, EXTENDED RELEASE ORAL at 08:05

## 2019-05-24 RX ADMIN — PANCRELIPASE 1 CAPSULE: 30000; 6000; 19000 CAPSULE, DELAYED RELEASE PELLETS ORAL at 05:05

## 2019-05-24 RX ADMIN — POTASSIUM BICARBONATE 50 MEQ: 977.5 TABLET, EFFERVESCENT ORAL at 10:05

## 2019-05-24 RX ADMIN — INSULIN ASPART 2 UNITS: 100 INJECTION, SOLUTION INTRAVENOUS; SUBCUTANEOUS at 12:05

## 2019-05-24 RX ADMIN — FUROSEMIDE 60 MG: 10 INJECTION, SOLUTION INTRAVENOUS at 08:05

## 2019-05-24 RX ADMIN — PANCRELIPASE 1 CAPSULE: 30000; 6000; 19000 CAPSULE, DELAYED RELEASE PELLETS ORAL at 12:05

## 2019-05-24 RX ADMIN — SODIUM CHLORIDE, SODIUM LACTATE, POTASSIUM CHLORIDE, AND CALCIUM CHLORIDE 250 ML: .6; .31; .03; .02 INJECTION, SOLUTION INTRAVENOUS at 01:05

## 2019-05-24 RX ADMIN — ATORVASTATIN CALCIUM 40 MG: 20 TABLET, FILM COATED ORAL at 08:05

## 2019-05-24 RX ADMIN — APIXABAN 5 MG: 5 TABLET, FILM COATED ORAL at 08:05

## 2019-05-24 RX ADMIN — TAMSULOSIN HYDROCHLORIDE 0.4 MG: 0.4 CAPSULE ORAL at 08:05

## 2019-05-24 RX ADMIN — ALBUMIN (HUMAN) 25 G: 25 SOLUTION INTRAVENOUS at 04:05

## 2019-05-24 RX ADMIN — PANCRELIPASE 1 CAPSULE: 30000; 6000; 19000 CAPSULE, DELAYED RELEASE PELLETS ORAL at 08:05

## 2019-05-24 RX ADMIN — VITAMIN D, TAB 1000IU (100/BT) 1000 UNITS: 25 TAB at 08:05

## 2019-05-24 RX ADMIN — CLOPIDOGREL 75 MG: 75 TABLET, FILM COATED ORAL at 08:05

## 2019-05-24 RX ADMIN — TORSEMIDE 20 MG: 20 TABLET ORAL at 05:05

## 2019-05-24 NOTE — PLAN OF CARE
Problem: Occupational Therapy Goal  Goal: Occupational Therapy Goal  Goals to be met by: 6/5/19     Patient will increase functional independence with ADLs by performing:    UE Dressing with Contact Guard Assistance.  LE Dressing with Maximum Assistance using AE as needed.  Grooming while standing with Minimal Assistance.  Toileting from bedside commode with Moderate Assistance for hygiene and clothing management.   Rolling to Bilateral with Stand-by Assistance.   Supine to sit with Set-up Assistance.  Step transfer with Stand-by Assistance  Toilet transfer to bedside commode with Stand-by Assistance.    Outcome: Ongoing (interventions implemented as appropriate)  Evaluated pt and established OT POC.  Continue OT as tolerated.  Natalie Clifford OT  5/24/2019

## 2019-05-24 NOTE — ASSESSMENT & PLAN NOTE
Patient on toprol for rate control  Consider checking orthostatics as patient complains of weakness, light-headedness and frequent falls when he ambulates  If orthostatic + suggest dose reduction of toprol, reduction of diuretics  Repeat echo read as completely normal, preserved EF, no diastolic dysfunction

## 2019-05-24 NOTE — ASSESSMENT & PLAN NOTE
Patient with abnormal Cr dating back to 3/12 with acute worsening of renal function on 3/27. Of note, patient was discharge on 3/21 the day after he received 125ml of contrast load in his angiogram. The next metabolic panel on 3/27 displayed his worsening renal function. Patient has multiple risk factors for CKD with HTN, CAD/CHF, Obesity, DM and risk factors for acute worsening with fluid shifts related to repeat paracenteses in the setting of likely prior JENNY. Patient presented on 5/22 with Cr around his new baseline at Cr 2.3 and improved overnight with administration of lasix 60mg IV once before next lab draw and pRBC.     Patient lying flat, speaking in full sentences, has significant peripheral lymphedema (chronic) but does not appear to be grossly overloaded with pulmonary edema on CXR and no rales on examination    Plan:  Suggest modification of diuretic to Torsemide 20mg BID   Daily CMP, Mag, Phos  Strict I's and O's  Daily weights  Avoid NSAIDS and other nephrotoxins  Renally dose all medications  Check orthostatics  US RP displayed decreased perfusion bilaterally.  Otherwise the kidneys appear unremarkable without evidence for hydronephrosis. Ascites.

## 2019-05-24 NOTE — SUBJECTIVE & OBJECTIVE
Interval history: patient switched from TID to BID Lasix IV administration, Cr continues to improve, albumin continues to worsen. 2D echo performed 5/23 read as normal EF, no diastolic dysfunction. Patient feels that peripheral edema has improved d/d but still having issues with ambulating.       Review of patient's allergies indicates:  No Known Allergies  Current Facility-Administered Medications   Medication Frequency    0.9%  NaCl infusion (for blood administration) Q24H PRN    acetaminophen tablet 650 mg Q4H PRN    apixaban tablet 5 mg BID    atorvastatin tablet 40 mg Daily    clopidogrel tablet 75 mg Daily    dextrose 50% injection 12.5 g PRN    dextrose 50% injection 25 g PRN    furosemide injection 60 mg BID    glucagon (human recombinant) injection 1 mg PRN    glucose chewable tablet 16 g PRN    glucose chewable tablet 24 g PRN    insulin aspart U-100 pen 0-5 Units QID (AC + HS) PRN    lipase-protease-amylase 6,000-19,000-30,000 units per capsule 1 capsule TID WM    metoprolol succinate (TOPROL-XL) 24 hr tablet 100 mg Daily    ondansetron disintegrating tablet 8 mg Q8H PRN    pantoprazole EC tablet 40 mg Daily    sodium chloride 0.9% flush 10 mL PRN    sodium chloride 0.9% flush 10 mL PRN    tamsulosin 24 hr capsule 0.4 mg Daily    vitamin D 1000 units tablet 1,000 Units Daily     Review of Systems   Constitutional: Positive for fatigue. Negative for chills and fever.   HENT: Negative.  Negative for postnasal drip and sore throat.    Eyes: Negative.    Respiratory: Positive for shortness of breath. Negative for chest tightness.    Cardiovascular: Positive for palpitations and leg swelling. Negative for chest pain.   Gastrointestinal: Positive for abdominal distention. Negative for abdominal pain, blood in stool, constipation and nausea.   Endocrine: Negative.    Genitourinary: Negative for dysuria.   Musculoskeletal: Negative for arthralgias, back pain and joint swelling.   Skin:  Negative.    Allergic/Immunologic: Negative.    Neurological: Negative.  Negative for light-headedness and headaches.   Hematological: Negative.    Psychiatric/Behavioral: Negative for confusion and dysphoric mood. The patient is not nervous/anxious and is not hyperactive.      Objective:     Vital Signs (Most Recent):  Temp: 97.7 °F (36.5 °C) (05/24/19 0845)  Pulse: 80 (05/24/19 1123)  Resp: 18 (05/24/19 0845)  BP: (!) 102/58 (05/24/19 0845)  SpO2: 100 % (05/24/19 0845)  O2 Device (Oxygen Therapy): room air (05/24/19 0845) Vital Signs (24h Range):  Temp:  [96.7 °F (35.9 °C)-98 °F (36.7 °C)] 97.7 °F (36.5 °C)  Pulse:  [67-85] 80  Resp:  [18] 18  SpO2:  [96 %-100 %] 100 %  BP: ()/(49-58) 102/58     Weight: 127.4 kg (280 lb 13.9 oz) (05/24/19 0400)  Body mass index is 43.99 kg/m².  Body surface area is 2.45 meters squared.    I/O last 3 completed shifts:  In: 2391.3 [P.O.:1080; Blood:1311.3]  Out: 2400 [Urine:2400]    Physical Exam   Constitutional: He appears well-developed.  Non-toxic appearance. He appears ill. No distress.   Obese male  Lying flat in bed on room air - able to speak in full sentences  Bruises about BL UE, wrapped LE BL with swelling   PICC line R chest wall   HENT:   Head: Normocephalic and atraumatic.   Mouth/Throat: Abnormal dentition.   Eyes: Pupils are equal, round, and reactive to light.   Cardiovascular: Normal rate, S1 normal, S2 normal and intact distal pulses.   Murmur heard.  Pulses:       Radial pulses are 2+ on the right side, and 2+ on the left side.        Dorsalis pedis pulses are 2+ on the right side, and 2+ on the left side.   Pulmonary/Chest: Effort normal.   Abdominal: Soft. He exhibits no distension. There is no tenderness. There is no guarding.   Musculoskeletal: He exhibits edema.   Lymphadenopathy:   Significant lymphedema BL LE   Neurological: He is alert. GCS eye subscore is 4. GCS verbal subscore is 5. GCS motor subscore is 6.   Skin: Skin is warm. There is pallor.    Nursing note and vitals reviewed.      Significant Labs:  All labs within the past 24 hours have been reviewed.    Significant Imaging:  Labs: Reviewed  ECG: Reviewed  Echo: Reviewed

## 2019-05-24 NOTE — PT/OT/SLP EVAL
"Physical Therapy Evaluation    Patient Name:  Jian Arrieta   MRN:  [id number]    Recommendations:     Discharge Recommendations:  home health PT   Discharge Equipment Recommendations: none   Barriers to discharge: decreased functional mobility requiring increased assist      Assessment:     Jian Arrieta is a 63 y.o. male admitted with a medical diagnosis of Symptomatic anemia.  He presents with the following impairments/functional limitations:  weakness, impaired functional mobilty, decreased safety awareness, impaired coordination, impaired cardiopulmonary response to activity, impaired endurance, impaired self care skills, gait instability. Pt presenting with decline in functional mobility compared to reported baseline of SBA-Jamee with ADL's and Mod I with amb using AD. Pt currently presenting with Min A for bed mobility and unable to amb. Pt reports he is mostly sedentary at baseline stating he spends most of his time sitting in lift chair. Pt is current with HHPT. Pt would benefit from continued skilled acute PT 3x/wk to improve functional mobility.  Recommending pt receive PT services in HHPT setting following discharge from hospital once medically cleared.     Rehab Prognosis: Fair; patient would benefit from acute skilled PT services to address these deficits and reach maximum level of function.    Recent Surgery: * No surgery found *      Plan:     During this hospitalization, patient to be seen 3 x/week to address the identified rehab impairments via gait training, therapeutic activities, therapeutic exercises, neuromuscular re-education and progress toward the following goals:    · Plan of Care Expires:  06/24/19    Subjective     Chief Complaint: Midback pain but unable to rate  Patient/Family Comments/goals: "nope not going to happen" following attempts to stand  Pain/Comfort:  · Pain Rating 1: (unable to rate )    Patients cultural, spiritual, Quaker conflicts given the current situation: " "no    Living Environment:  Pt lives with wife in [facility] with 3-5 EULA and bilat. Handrails with reported handicapped accessible bathroom.   Prior to admission, patients level of function was requiring assist for selfcare and ADL's reporting Mod I levels with amb using AD.  Equipment used at home: walker, rolling, rollator, cane, straight, shower chair, grab bar, raised toilet, hospital bed, other (see comments)(lift chair).  DME owned (not currently used): none.  Upon discharge, patient will have assistance from wife.    Objective:     Communicated with RN prior to session.  Patient found supine with telemetry, PICC line  upon PT entry to room.    General Precautions: Standard, fall   Orthopedic Precautions:N/A   Braces: N/A     Exams:  · RLE ROM: WFL  · RLE Strength: WFL  · LLE ROM: WFL  · LLE Strength: WFL    Functional Mobility:  · Bed Mobility:     · Rolling Left:  minimum assistance  · Scooting: minimum assistance  · Bridging: minimum assistance  · Supine to Sit: minimum assistance  · Sit to Supine: stand by assistance  · Transfers:     · Sit to Stand:  contact guard assistance with no AD  · Gait: unable, immediately sitting following standing attempts   · Balance: Sitting: SBA     Standing: CGA      Therapeutic Activities and Exercises:   - STS x2 CGA  - Static standing ~5secs stating "nope not going to happen"  - Pt educated on:   -PT roles, expectations, and POC    -Safety with mobility   -Benefits of OOB activities to increase strength and functional mobility    -Performing ther ex for increasing LE ROM and strength   -Discharge recommendations     AM-PAC 6 CLICK MOBILITY  Total Score:12     Patient left supine with call button in reach.    GOALS:   Multidisciplinary Problems     Physical Therapy Goals        Problem: Physical Therapy Goal    Goal Priority Disciplines Outcome Goal Variances Interventions   Physical Therapy Goal     PT, PT/OT Ongoing (interventions implemented as appropriate)     Description:  " Goals to be met by:     Patient will increase functional independence with mobility by performin. Supine to sit with Stand-by Assistance  2. Sit to supine with Stand-by Assistance  3. Sit to stand transfer with Stand-by Assistance  4. Gait  x 25 feet with Stand-by Assistance using LRAD  5. Lower extremity exercise program x15 reps per handout, with independence                     History:     Past Medical History:   Diagnosis Date    Alcohol abuse     Anasarca 2019    Arthropathy associated with neurological disorder 2015    Atherosclerosis     Charcot foot due to diabetes mellitus     Chronic combined systolic and diastolic heart failure 2019 Left VentricleModerate decreased ejection fraction at 30%. Normal left ventricular cavity size. Normal wall thickness observed. Grade I (mild) left ventricular diastolic dysfunction consistent with impaired relaxation. Normal left atrial pressure. Moderate, global hypokinesis(see wall scoring diagram). Right VentricleNormal cavity size, wall thickness and ejection fraction. Wall motion n    Chronic pancreatitis 2019    Colon polyps     approx 5 yrs ago    Coronary artery disease due to calcified coronary lesion 2015    5 stents on ASA      Diabetic polyneuropathy associated with type 2 diabetes mellitus 2015    Diverticulosis 2019    DM type 2 with diabetic peripheral neuropathy 2019    Essential hypertension 2019    Former smoker 2015    Healed ulcer of left foot on examination 2017    Hydrocele     approx 1.5 yrs ago    Hypoalbuminemia 2019    Lymphedema of both lower extremities 2019    Mixed hyperlipidemia 2015    Morbid obesity with BMI of 50.0-59.9, adult 2015    Onychomycosis of multiple toenails with type 2 diabetes mellitus and peripheral neuropathy 2017    Perianal cyst     approx 2 yrs ago    Pseudocyst of pancreas 2019  Liver has a cirrhotic morphology with no focal lesions.  Significant interval increase in ascites when compared to prior exam which may account for patient's abdominal distension.  Hypodense air-fluid collection along the body of the pancreas which is slightly smaller when compared to prior CT.  Findings may relate to pancreatic necrosis with pancreatic pseudocysts with infected pseudocyst    Skin cancer     skin cancer    Sleep apnea 8/26/2015    Status post bariatric surgery 1/11/2016    Type 2 diabetes mellitus, with long-term current use of insulin 5/8/2015       Past Surgical History:   Procedure Laterality Date    ANGIOGRAM, CORONARY ARTERY Right 3/20/2019    Performed by [facility]que [facility] at [facility] CATH LAB    ANGIOPLASTY      total x5 stents    Bypass graft study  3/20/2019    Performed by [facility]que [facility] at [facility] CATH LAB    COLONOSCOPY N/A 10/6/2015    Performed by Shekhar Richards MD at [facility] ENDO (2ND FLR)    CORONARY ARTERY BYPASS GRAFT  2017    x3    CYST REMOVAL      GASTRECTOMY      GASTRECTOMY-SLEEVE-LAPAROSCOPIC - 90922 N/A 12/22/2015    Performed by Micheal Villavicencio Jr., MD at [facility] OR 2ND FLR    KNEE ARTHROSCOPY      perianal surgery      perianal cyst removed    pleurx N/A 5/17/2019    Performed by [facility] Diagnostic Provider at [facility] OR 2ND FLR       Time Tracking:     PT Received On: 05/24/19  PT Start Time: 1005     PT Stop Time: 1029  PT Total Time (min): 24 min     Billable Minutes: Evaluation 11 and Therapeutic Activity 13      Selvin Daley, PT  05/24/2019

## 2019-05-24 NOTE — PROGRESS NOTES
[facility]  Nephrology  Progress Note    Patient Name: Jian Arrieta  MRN: [id number]  Admission Date: 5/22/2019  Hospital Length of Stay: 2 days  Attending Provider: Roly Caba MD   Primary Care Physician: Leon Barajas DO  Principal Problem:Symptomatic anemia    Subjective:     HPI: Mr. Jian Arrieta is a 63 year old male with CAD (s/p PCI with 5 stents) with subsequent CABG x 3v (2017 at ), chronic combined systolic and diastolic heart failure, HLD, DM2 on insulin, BERHANE on CPAP, chronic pancreatitis with pseudocyst being admitted to observation for shortness of breath. Patient had a paracentesis and lower extremity US 5/23. He endorses SOB and intermittent b/l leg swelling x several months. He denies fever, chest pain, abdominal pain, vomiting, melena, or confusion. He is on eliquis.Patient states he feels like he has gained over 25 pounds of fluid.  Feels frustrated that he has not seen a nephrologist during last admission.  Had a paracentesis 5/23 with 3.6L removed.   Nephrology was consulted out of concern for ELIEZER on CKDIII and volume overload.     Of note, patient was hospitalized in March 2019 at which point he was evaluated for cirrhosis. US liver displayed cirrhosis and concern for portal hypertension. He underwent 3 large volume paracentesis during this hospitalization and underwent liver biopsy with transjugular pressure gradient was 2mm hg and biopsy was not consistent with cirrhosis. Additionally, ascitic fluid studies have been consistent with high SAAG and high protein more in line with heart failure.     Interval history: patient switched from TID to BID Lasix IV administration, Cr continues to improve, albumin continues to worsen. 2D echo performed 5/23 read as normal EF, no diastolic dysfunction. Patient feels that peripheral edema has improved d/d but still having issues with ambulating.       Review of patient's allergies indicates:  No Known Allergies  Current  Facility-Administered Medications   Medication Frequency    0.9%  NaCl infusion (for blood administration) Q24H PRN    acetaminophen tablet 650 mg Q4H PRN    apixaban tablet 5 mg BID    atorvastatin tablet 40 mg Daily    clopidogrel tablet 75 mg Daily    dextrose 50% injection 12.5 g PRN    dextrose 50% injection 25 g PRN    furosemide injection 60 mg BID    glucagon (human recombinant) injection 1 mg PRN    glucose chewable tablet 16 g PRN    glucose chewable tablet 24 g PRN    insulin aspart U-100 pen 0-5 Units QID (AC + HS) PRN    lipase-protease-amylase 6,000-19,000-30,000 units per capsule 1 capsule TID WM    metoprolol succinate (TOPROL-XL) 24 hr tablet 100 mg Daily    ondansetron disintegrating tablet 8 mg Q8H PRN    pantoprazole EC tablet 40 mg Daily    sodium chloride 0.9% flush 10 mL PRN    sodium chloride 0.9% flush 10 mL PRN    tamsulosin 24 hr capsule 0.4 mg Daily    vitamin D 1000 units tablet 1,000 Units Daily     Review of Systems   Constitutional: Positive for fatigue. Negative for chills and fever.   HENT: Negative.  Negative for postnasal drip and sore throat.    Eyes: Negative.    Respiratory: Positive for shortness of breath. Negative for chest tightness.    Cardiovascular: Positive for palpitations and leg swelling. Negative for chest pain.   Gastrointestinal: Positive for abdominal distention. Negative for abdominal pain, blood in stool, constipation and nausea.   Endocrine: Negative.    Genitourinary: Negative for dysuria.   Musculoskeletal: Negative for arthralgias, back pain and joint swelling.   Skin: Negative.    Allergic/Immunologic: Negative.    Neurological: Negative.  Negative for light-headedness and headaches.   Hematological: Negative.    Psychiatric/Behavioral: Negative for confusion and dysphoric mood. The patient is not nervous/anxious and is not hyperactive.      Objective:     Vital Signs (Most Recent):  Temp: 97.7 °F (36.5 °C) (05/24/19 0845)  Pulse: 80  (05/24/19 1123)  Resp: 18 (05/24/19 0845)  BP: (!) 102/58 (05/24/19 0845)  SpO2: 100 % (05/24/19 0845)  O2 Device (Oxygen Therapy): room air (05/24/19 0845) Vital Signs (24h Range):  Temp:  [96.7 °F (35.9 °C)-98 °F (36.7 °C)] 97.7 °F (36.5 °C)  Pulse:  [67-85] 80  Resp:  [18] 18  SpO2:  [96 %-100 %] 100 %  BP: ()/(49-58) 102/58     Weight: 127.4 kg (280 lb 13.9 oz) (05/24/19 0400)  Body mass index is 43.99 kg/m².  Body surface area is 2.45 meters squared.    I/O last 3 completed shifts:  In: 2391.3 [P.O.:1080; Blood:1311.3]  Out: 2400 [Urine:2400]    Physical Exam   Constitutional: He appears well-developed.  Non-toxic appearance. He appears ill. No distress.   Obese male  Lying flat in bed on room air - able to speak in full sentences  Bruises about BL UE, wrapped LE BL with swelling   PICC line R chest wall   HENT:   Head: Normocephalic and atraumatic.   Mouth/Throat: Abnormal dentition.   Eyes: Pupils are equal, round, and reactive to light.   Cardiovascular: Normal rate, S1 normal, S2 normal and intact distal pulses.   Murmur heard.  Pulses:       Radial pulses are 2+ on the right side, and 2+ on the left side.        Dorsalis pedis pulses are 2+ on the right side, and 2+ on the left side.   Pulmonary/Chest: Effort normal.   Abdominal: Soft. He exhibits no distension. There is no tenderness. There is no guarding.   Musculoskeletal: He exhibits edema.   Lymphadenopathy:   Significant lymphedema BL LE   Neurological: He is alert. GCS eye subscore is 4. GCS verbal subscore is 5. GCS motor subscore is 6.   Skin: Skin is warm. There is pallor.   Nursing note and vitals reviewed.      Significant Labs:  All labs within the past 24 hours have been reviewed.    Significant Imaging:  Labs: Reviewed  ECG: Reviewed  Echo: Reviewed    Assessment/Plan:     Stage 3 chronic kidney disease  Patient with abnormal Cr dating back to 3/12 with acute worsening of renal function on 3/27. Of note, patient was discharge on 3/21  the day after he received 125ml of contrast load in his angiogram. The next metabolic panel on 3/27 displayed his worsening renal function. Patient has multiple risk factors for CKD with HTN, CAD/CHF, Obesity, DM and risk factors for acute worsening with fluid shifts related to repeat paracenteses in the setting of likely prior JENNY. Patient presented on 5/22 with Cr around his new baseline at Cr 2.3 and improved overnight with administration of lasix 60mg IV once before next lab draw and pRBC.     Patient lying flat, speaking in full sentences, has significant peripheral lymphedema (chronic) but does not appear to be grossly overloaded with pulmonary edema on CXR and no rales on examination    Plan:  Suggest modification of diuretic to Torsemide 20mg BID   Daily CMP, Mag, Phos  Strict I's and O's  Daily weights  Avoid NSAIDS and other nephrotoxins  Renally dose all medications  Check orthostatics  US RP displayed decreased perfusion bilaterally.  Otherwise the kidneys appear unremarkable without evidence for hydronephrosis. Ascites.    Hypoalbuminemia  · Patient with chronic pancreatitis on enzyme replacement  · Has persistent and worsening hypoalbuminemia   · UA without any protein loss  · Suggest patient be re-evaluated by GI specialist regarding enzyme replacement which may need to be increased (could be evaluated outpatient, suggest referral to GI now or upon discharge)   · Modification to torsemide for diuretic now and upon discharge as it is longer acting and has better activity in the setting of severe hypoalbuminemia     Acute on chronic combined systolic and diastolic heart failure  Patient on toprol for rate control  Consider checking orthostatics as patient complains of weakness, light-headedness and frequent falls when he ambulates  If orthostatic + suggest dose reduction of toprol, reduction of diuretics  Repeat echo read as completely normal, preserved EF, no diastolic dysfunction          Thank you  for your consult. I will follow-up with patient. Please contact us if you have any additional questions.    Leon Boston MD  Nephrology  [facility]

## 2019-05-24 NOTE — PLAN OF CARE
Problem: Adult Inpatient Plan of Care  Goal: Plan of Care Review  Outcome: Ongoing (interventions implemented as appropriate)  Pt able to make needs known, BS checked as ordered,dressings to BLE on and intact, no distress noted, will continue to monitor.    Problem: Diabetes Comorbidity  Goal: Blood Glucose Level Within Desired Range    Intervention: Maintain Glycemic Control     05/24/19 0528   Monitor and Manage Ketoacidosis   Glycemic Management blood glucose monitoring;supplemental insulin given

## 2019-05-24 NOTE — ASSESSMENT & PLAN NOTE
· Patient with chronic pancreatitis on enzyme replacement  · Has persistent and worsening hypoalbuminemia   · UA without any protein loss  · Suggest patient be re-evaluated by GI specialist regarding enzyme replacement   · Modification to torsemide for diuretic now and upon discharge as it is longer acting and has better activity in the setting of severe hypoalbuminemia

## 2019-05-24 NOTE — PROGRESS NOTES
Hospital Medicine  Progress note    Team: [facility] O Roly Caba MD  Admit Date: 5/22/2019  [place]   Length of Stay:  LOS: 2 days   Code status: Full Code    Principal Problem:  Symptomatic anemia    Overview:    Interval hx: Patinet seen and examined at bedside, TTE normal, ELIEZER improving IR paracentesis ordered for today. Nephrology following.     ROS   Constitutional: Negative for chills, fatigue, fever.   HENT: Negative for sore throat, trouble swallowing.    Eyes: Negative for photophobia, visual disturbance.   Respiratory: Negative for cough, shortness of breath.    Cardiovascular: Negative for chest pain, palpitations, leg swelling.   Gastrointestinal: Negative for abdominal pain, constipation, diarrhea, nausea, vomiting.   Endocrine: Negative for cold intolerance, heat intolerance.   Genitourinary: Negative for dysuria, frequency.   Musculoskeletal: Negative for arthralgias, myalgias.   Skin: Negative for rash, wound, erythema   Neurological: Negative for dizziness, syncope, weakness, light-headedness.   Psychiatric/Behavioral: Negative for confusion, hallucinations, anxiety  All other systems reviewed and are negative.    PEx  Temp:  [96.7 °F (35.9 °C)-98 °F (36.7 °C)]   Pulse:  [71-85]   Resp:  [18]   BP: ()/(49-58)   SpO2:  [96 %-100 %]     Intake/Output Summary (Last 24 hours) at 5/24/2019 1331  Last data filed at 5/24/2019 1000  Gross per 24 hour   Intake 450 ml   Output 1250 ml   Net -800 ml     Constitutional: Appears well-developed and well-nourished.   Head: Normocephalic and atraumatic.   Mouth/Throat: Oropharynx is clear and moist.   Eyes: EOM are normal. Pupils are equal, round, and reactive to light. No scleral icterus.   Neck: Normal range of motion. Neck supple.   Cardiovascular: Normal rate and regular rhythm.  No murmur heard.  Pulmonary/Chest: Effort normal and breath sounds normal. No respiratory distress. No wheezes, rales, or rhonchi  Abdominal: Soft. Bowel sounds are normal.   No distension or tenderness  Musculoskeletal: Normal range of motion. No edema.   Neurological: Alert and oriented to person, place, and time.   Skin: Skin is warm and dry.   Psychiatric: Normal mood and affect. Behavior is normal.     Recent Labs   Lab 05/22/19  2153 05/23/19  0448 05/24/19  0530   WBC 6.96 5.89 6.30   HGB 8.8* 8.8* 8.6*   HCT 26.1* 26.0* 26.1*    212 223     Recent Labs   Lab 05/22/19  1531 05/23/19  0448 05/24/19  0530   * 134* 135*   K 3.6 3.2* 3.2*    107 107   CO2 19* 22* 23   BUN 50* 46* 43*   CREATININE 2.3* 2.0* 1.9*   * 67* 120*   CALCIUM 6.8* 7.1* 6.9*   MG 1.7 1.6 1.5*   PHOS 4.6* 4.3 3.8   LIPASE <3*  --   --      Recent Labs   Lab 05/22/19  1531 05/23/19  0448 05/24/19  0530   ALKPHOS 117 116 121   ALT 19 21 25   AST 20 22 33   ALBUMIN 1.7* 1.7* 1.4*   PROT 3.9* 4.0* 3.5*   BILITOT 0.3 0.4 0.4   INR 1.1 1.1 1.2        Recent Labs     05/22/19  1531   TROPONINI 0.022     Recent Labs   Lab 05/17/19  1339 05/23/19  0851 05/23/19  1201 05/23/19  1707 05/23/19  2152 05/24/19  0840   POCTGLUCOSE 152* 105 200* 195* 376* 118*     Hemoglobin A1C   Date Value Ref Range Status   05/23/2019 7.1 (H) 4.0 - 5.6 % Final     Comment:     ADA Screening Guidelines:  5.7-6.4%  Consistent with prediabetes  >or=6.5%  Consistent with diabetes  High levels of fetal hemoglobin interfere with the HbA1C  assay. Heterozygous hemoglobin variants (HbS, HgC, etc)do  not significantly interfere with this assay.   However, presence of multiple variants may affect accuracy.     03/18/2019 7.4 (H) 4.0 - 5.6 % Final     Comment:     ADA Screening Guidelines:  5.7-6.4%  Consistent with prediabetes  >or=6.5%  Consistent with diabetes  High levels of fetal hemoglobin interfere with the HbA1C  assay. Heterozygous hemoglobin variants (HbS, HgC, etc)do  not significantly interfere with this assay.   However, presence of multiple variants may affect accuracy.     01/28/2019 6.9 (H) 4.0 - 5.6 % Final      Comment:     ADA Screening Guidelines:  5.7-6.4%  Consistent with prediabetes  >or=6.5%  Consistent with diabetes  High levels of fetal hemoglobin interfere with the HbA1C  assay. Heterozygous hemoglobin variants (HbS, HgC, etc)do  not significantly interfere with this assay.   However, presence of multiple variants may affect accuracy.         Scheduled Meds:   apixaban  5 mg Oral BID    atorvastatin  40 mg Oral Daily    clopidogrel  75 mg Oral Daily    lipase-protease-amylase 6,000-19,000-30,000 units  1 capsule Oral TID WM    metoprolol succinate  100 mg Oral Daily    pantoprazole  40 mg Oral Daily    tamsulosin  1 capsule Oral Daily    torsemide  20 mg Oral BID    vitamin D  1,000 Units Oral Daily     Continuous Infusions:  As Needed:  sodium chloride, acetaminophen, dextrose 50%, dextrose 50%, glucagon (human recombinant), glucose, glucose, insulin aspart U-100, ondansetron, sodium chloride 0.9%, sodium chloride 0.9%    Active Hospital Problems    Diagnosis  POA    *Symptomatic anemia [D64.9]  Yes    Alteration in skin integrity [R23.9]  Yes    Venous stasis dermatitis of both lower extremities [I87.2]  Yes    Stage 3 chronic kidney disease [N18.3]  Yes    Acute renal failure with specified lesion [N17.8]  Yes    Hypertension associated with diabetes [E11.59, I10]  Yes    Paroxysmal atrial fibrillation [I48.0]  Yes    Ascites [R18.8]  Yes    DM type 2 with diabetic peripheral neuropathy [E11.42]  Yes    Hypoalbuminemia [E88.09]  Yes    Acute on chronic combined systolic and diastolic heart failure [I50.43]  Yes     1-28-19  Left Ventricle Moderate decreased ejection fraction at 30%. Normal left ventricular cavity size. Normal wall thickness observed. Grade I (mild) left ventricular diastolic dysfunction consistent with impaired relaxation. Normal left atrial pressure. Moderate, global hypokinesis(see wall scoring diagram).   Right Ventricle Normal cavity size, wall thickness and ejection  fraction. Wall motion normal.   Left Atrium Normal atrial size. .   Right Atrium Normal atrial size.   Aortic Valve The valve is trileaflet. Aortic valve sclerosis is mild. No stenosis. No regurgitation. Mobility is normal   Mitral Valve Normal valve structure. No stenosis. No regurgitation. Normal leaflet mobility.   Tricuspid Valve Tricuspid valve not well visualized. Trace regurgitation.   Pulmonic Valve The pulmonic valve was not well visualized.   IVC/SVC Inferior vena cava is not well visualized.   Ascending Aorta The aortic root and ascending aorta are normal in size.   Pericardium No pericardial effusion.           Anasarca [R60.1]  Yes    Chronic pancreatitis [K86.1]  Yes    Morbid obesity with BMI of 50.0-59.9, adult [E66.01, Z68.43]  Not Applicable      Resolved Hospital Problems   No resolved problems to display.         Assessment and Plan    # Symptomatic Anemia  - no evidence of GI bleed  - transfusing 1 unit of PRBC  - Hgb stable         # Acute on chronic systolic and diastolic heart failure  - TTE ordered, showed normal EF and diastolic function  - lasix 60 mg IV transitioned to Oral Torsemide BID as per nephrology   - daily weights  - strict I/Os     # Ascites  - its been unclear if this has been from liver disease of cardiac disease   - patient had a recent liver biopsy negative for cirrhosis  - s/p paracentesis as outpatient with 2L removed; IR reordered for today       # Essential HTN   - cont home BP meds     # DM2 with HTN and neuropathy and CKD stage 3  - Hba1c 7.1  - SSI; on home insulin      # Paroxysmal atrial fibrillation  - cont toprol and eliquis     # Morbid Obesity  Body mass index is 44.95 kg/m².    #Hypoalbuminemia   - UA negative for protein  - Liver biopsy negative for cirrhosis   - might benefit from GI referral for EGD/Colonoscopy    # LE Venous stasis ulcers  - wound care consult hydrofera blue placed to left leg     # ELIEZER on CKD stage 3  - urine lytes and nephrology  consult as per patient request; renal U/S showed no hydronephrosis   - nephrology consulted     # Chronic pancreatitis  - cont creon  - past h/o alcohol abuse  - could explain recurrent ascites in the absense of cirrhosis     High Risk Conditions  DM, BERHANE, HTN, Extreme Obesity      Diet: diabetic diet   GI PPx:   DVT PPx:    Anticoagulants   Medication Route Frequency    apixaban tablet 5 mg Oral BID       Goals of Care: Full code   Discharge plan: pending IR paracentesis and Pt./OT recs     Time (minutes) spent in care of the patient (Greater than 1/2 spent in direct face-to-face contact) 35 minutes     Roly Sanabria MD  Staff Hospitalist  Department of Hospital Medicine  [facility]   [phone]

## 2019-05-24 NOTE — PLAN OF CARE
Problem: Physical Therapy Goal  Goal: Physical Therapy Goal  Goals to be met by:     Patient will increase functional independence with mobility by performin. Supine to sit with Stand-by Assistance  2. Sit to supine with Stand-by Assistance  3. Sit to stand transfer with Stand-by Assistance  4. Gait  x 25 feet with Stand-by Assistance using LRAD  5. Lower extremity exercise program x15 reps per handout, with independence   Outcome: Ongoing (interventions implemented as appropriate)  Eval completed and POC established     Severiano Daley, PT, DPT  2019

## 2019-05-24 NOTE — PLAN OF CARE
Problem: Adult Inpatient Plan of Care  Goal: Plan of Care Review  Outcome: Ongoing (interventions implemented as appropriate)  Pt had three bowel movements today, now has bedside commode which was ordered for pt and he can now use.  Pt complained of severe fatigue and stated that he would prefer if he were to use bedside commode.  Pt had two dosages of lasix today and has good urinary output.  Pt vitals stable, no significant incidents during days shift.  Pt stated that PCT left him on the commode for too long and didn't leave call bell or white phone near him and he was really upset.  Apologized to pt and let him know that we will be more aware of response time and it would not happen again tomorrow.  Will be more aware and have call bells near pt.

## 2019-05-24 NOTE — PT/OT/SLP EVAL
Occupational Therapy   Evaluation    Name: Jian Arrieta  MRN: [id number]  Admitting Diagnosis:  Symptomatic anemia      Recommendations:     Discharge Recommendations: home health OT  Discharge Equipment Recommendations:  none  Barriers to discharge:       Assessment:     Jian Arrieta is a 63 y.o. male with a medical diagnosis of Symptomatic anemia.  He presents with impaired ADL and functional mobility performance as limited by generalized weakness and impaired endurance. Pt participated in bed mobility with min (A) and brief sit>stand with RW with CGA (A) however self limiting in abilities. Pt expressed concern with BLEs related to his fluid retention, however reported it had been decreasing as of late. Pt reportede ~4 recent falls with pt explaining each were related to nighttime toileting. OT educated pt on fall risk reduction by placing his BSC near bed for nighttime toileting however pt not receptive of recommendation. Performance deficits affecting function: weakness, impaired endurance, impaired self care skills, gait instability, impaired balance, impaired functional mobilty, impaired cardiopulmonary response to activity, decreased coordination, decreased lower extremity function, decreased safety awareness.  Pt would benefit from continued OT skilled services 3x/wk to improve daily living skills to optimize QOL. Pt is recommended to discharge to OT at this time.      Rehab Prognosis: Good; patient would benefit from acute skilled OT services to address these deficits and reach maximum level of function.       Plan:     Patient to be seen 3 x/week to address the above listed problems via self-care/home management, therapeutic activities, therapeutic exercises  · Plan of Care Expires: 06/24/19  · Plan of Care Reviewed with: patient    Subjective     Chief Complaint: Fluid retention in BLEs, nurse aware  Patient/Family Comments/goals: Return home    Occupational Profile:  Living Environment: Pt lives at  home with wife on the first level of home using bed bath for bathing (however reports he is in the process of creating handicapped accessible bathroom to improve showering capability)  Previous level of function: Pt questionable historian reporting he required some (A) with showering and (A) with LB dressing.   Roles and Routines: Pt is not working nor driving, however spends his free time watching car shows.  Equipment Used at Home:  walker, rolling, rollator, cane, straight, shower chair, grab bar, raised toilet, hospital bed(lift chair)  Assistance upon Discharge: None, however wife available at home     Pain/Comfort:  · Pain Rating 1: other (see comments)(YONIS)    Patients cultural, spiritual, Mandaeism conflicts given the current situation: no    Objective:     Communicated with: RN prior to session.  Patient found HOB elevated with telemetry, PICC line upon OT entry to room.    General Precautions: Standard, fall   Orthopedic Precautions:N/A   Braces: N/A     Occupational Performance:    Bed Mobility:    · Patient completed Rolling/Turning to Left with  minimum assistance  · Patient completed Scooting/Bridging with minimum assistance  · Patient completed Supine to Sit with minimum assistance  · Patient completed Sit to Supine with minimum assistance    Functional Mobility/Transfers:  · Patient completed Sit <> Stand Transfer with stand by assistance  with  rolling walker   · Functional Mobility: Pt completed brief sit>stand with RW however limited by fear or fluid in BLEs. Pt expressed he also was fatigued quickly by the stand.    Activities of Daily Living:  · Upper Body Dressing: moderate assistance donning gown EOB   · Lower Body Dressing: total assistance donning socks EOB     Cognitive/Visual Perceptual:  Cognitive/Psychosocial Skills:     -       Oriented to: Person, Place, Time and Situation   -       Follows Commands/attention:Easily distracted  -       Communication: clear/fluent  -       Memory: No  Deficits noted  -       Safety awareness/insight to disability: impaired   -       Mood/Affect/Coping skills/emotional control: Withdrawn    Physical Exam:  Balance:    -       Demo good sitting balance and fair standing   Dominant hand:    -       right  Upper Extremity Range of Motion:     -       Right Upper Extremity: WFL  -       Left Upper Extremity: WFL  Upper Extremity Strength:    -       Right Upper Extremity: 4/5 MMT  -       Left Upper Extremity: 4/5 MMT   Strength:    -       Right Upper Extremity: WFL  -       Left Upper Extremity: WFL    AMPAC 6 Click ADL:  AMPAC Total Score: 16    Treatment & Education:  Pt educated on role of occupational therapy, POC, and safety during ADLs and functional mobility. Pt and OT discussed importance of safe, continued mobility to optimize daily living skills. Pt verbalized understanding.  White board updated during session. Pt given instruction to call for medical staff/nurse for assistance.     Education:    Patient left HOB elevated with all lines intact and call button in reach    GOALS:   Multidisciplinary Problems     Occupational Therapy Goals        Problem: Occupational Therapy Goal    Goal Priority Disciplines Outcome Interventions   Occupational Therapy Goal     OT, PT/OT Ongoing (interventions implemented as appropriate)    Description:  Goals to be met by: 6/5/19     Patient will increase functional independence with ADLs by performing:    UE Dressing with Contact Guard Assistance.  LE Dressing with Maximum Assistance using AE as needed.  Grooming while standing with Minimal Assistance.  Toileting from bedside commode with Moderate Assistance for hygiene and clothing management.   Rolling to Bilateral with Stand-by Assistance.   Supine to sit with Set-up Assistance.  Step transfer with Stand-by Assistance  Toilet transfer to bedside commode with Stand-by Assistance.                      History:     Past Medical History:   Diagnosis Date    Alcohol  abuse     Anasarca 1/28/2019    Arthropathy associated with neurological disorder 9/2/2015    Atherosclerosis     Charcot foot due to diabetes mellitus     Chronic combined systolic and diastolic heart failure 01/29/2019 1-28-19 Left VentricleModerate decreased ejection fraction at 30%. Normal left ventricular cavity size. Normal wall thickness observed. Grade I (mild) left ventricular diastolic dysfunction consistent with impaired relaxation. Normal left atrial pressure. Moderate, global hypokinesis(see wall scoring diagram). Right VentricleNormal cavity size, wall thickness and ejection fraction. Wall motion n    Chronic pancreatitis 1/28/2019    Colon polyps     approx 5 yrs ago    Coronary artery disease due to calcified coronary lesion 05/08/2015    5 stents on ASA      Diabetic polyneuropathy associated with type 2 diabetes mellitus 9/2/2015    Diverticulosis 1/28/2019    DM type 2 with diabetic peripheral neuropathy 2/4/2019    Essential hypertension 1/28/2019    Former smoker 8/26/2015    Healed ulcer of left foot on examination 6/20/2017    Hydrocele     approx 1.5 yrs ago    Hypoalbuminemia 2/4/2019    Lymphedema of both lower extremities 1/29/2019    Mixed hyperlipidemia 5/8/2015    Morbid obesity with BMI of 50.0-59.9, adult 5/8/2015    Onychomycosis of multiple toenails with type 2 diabetes mellitus and peripheral neuropathy 6/20/2017    Perianal cyst     approx 2 yrs ago    Pseudocyst of pancreas 1/28/2019 1-28-19 Liver has a cirrhotic morphology with no focal lesions.  Significant interval increase in ascites when compared to prior exam which may account for patient's abdominal distension.  Hypodense air-fluid collection along the body of the pancreas which is slightly smaller when compared to prior CT.  Findings may relate to pancreatic necrosis with pancreatic pseudocysts with infected pseudocyst    Skin cancer     skin cancer    Sleep apnea 8/26/2015    Status post  bariatric surgery 1/11/2016    Type 2 diabetes mellitus, with long-term current use of insulin 5/8/2015       Past Surgical History:   Procedure Laterality Date    ANGIOGRAM, CORONARY ARTERY Right 3/20/2019    Performed by Bob Duque [facility] at [facility] CATH LAB    ANGIOPLASTY      total x5 stents    Bypass graft study  3/20/2019    Performed by Bob Duque MD at [facility] CATH LAB    COLONOSCOPY N/A 10/6/2015    Performed by Shekhar Richards MD at [facility] ENDO (2ND FLR)    CORONARY ARTERY BYPASS GRAFT  2017    x3    CYST REMOVAL      GASTRECTOMY      GASTRECTOMY-SLEEVE-LAPAROSCOPIC - 36781 N/A 12/22/2015    Performed by Micheal Villavicencio Jr., MD at [facility] OR 2ND FLR    KNEE ARTHROSCOPY      perianal surgery      perianal cyst removed    pleurx N/A 5/17/2019    Performed by [facility] Diagnostic Provider at [facility] OR 2ND FLR       Time Tracking:     OT Date of Treatment: 05/24/19  OT Start Time: 1005  OT Stop Time: 1030  OT Total Time (min): 25 min    Billable Minutes:Evaluation 12 min  Therapeutic Activity 13 min    Natalie Clifford OT  5/24/2019

## 2019-05-25 ENCOUNTER — PATIENT MESSAGE (OUTPATIENT)
Dept: INTERNAL MEDICINE | Facility: CLINIC | Age: 64
End: 2019-05-25

## 2019-05-25 LAB
ALBUMIN SERPL BCP-MCNC: 1.6 G/DL (ref 3.5–5.2)
ALBUMIN SERPL BCP-MCNC: 1.7 G/DL (ref 3.5–5.2)
ALP SERPL-CCNC: 105 U/L (ref 55–135)
ALP SERPL-CCNC: 106 U/L (ref 55–135)
ALT SERPL W/O P-5'-P-CCNC: 23 U/L (ref 10–44)
ALT SERPL W/O P-5'-P-CCNC: 25 U/L (ref 10–44)
ANION GAP SERPL CALC-SCNC: 4 MMOL/L (ref 8–16)
ANION GAP SERPL CALC-SCNC: 6 MMOL/L (ref 8–16)
AST SERPL-CCNC: 26 U/L (ref 10–40)
AST SERPL-CCNC: 29 U/L (ref 10–40)
BASOPHILS # BLD AUTO: 0.03 K/UL (ref 0–0.2)
BASOPHILS # BLD AUTO: 0.04 K/UL (ref 0–0.2)
BASOPHILS NFR BLD: 0.6 % (ref 0–1.9)
BASOPHILS NFR BLD: 0.8 % (ref 0–1.9)
BILIRUB SERPL-MCNC: 0.4 MG/DL (ref 0.1–1)
BILIRUB SERPL-MCNC: 0.4 MG/DL (ref 0.1–1)
BUN SERPL-MCNC: 42 MG/DL (ref 8–23)
BUN SERPL-MCNC: 44 MG/DL (ref 8–23)
CALCIUM SERPL-MCNC: 6.8 MG/DL (ref 8.7–10.5)
CALCIUM SERPL-MCNC: 7 MG/DL (ref 8.7–10.5)
CHLORIDE SERPL-SCNC: 106 MMOL/L (ref 95–110)
CHLORIDE SERPL-SCNC: 107 MMOL/L (ref 95–110)
CO2 SERPL-SCNC: 21 MMOL/L (ref 23–29)
CO2 SERPL-SCNC: 25 MMOL/L (ref 23–29)
CREAT SERPL-MCNC: 2 MG/DL (ref 0.5–1.4)
CREAT SERPL-MCNC: 2.3 MG/DL (ref 0.5–1.4)
DIFFERENTIAL METHOD: ABNORMAL
DIFFERENTIAL METHOD: ABNORMAL
EOSINOPHIL # BLD AUTO: 0.1 K/UL (ref 0–0.5)
EOSINOPHIL # BLD AUTO: 0.1 K/UL (ref 0–0.5)
EOSINOPHIL NFR BLD: 1.2 % (ref 0–8)
EOSINOPHIL NFR BLD: 1.4 % (ref 0–8)
ERYTHROCYTE [DISTWIDTH] IN BLOOD BY AUTOMATED COUNT: 15.9 % (ref 11.5–14.5)
ERYTHROCYTE [DISTWIDTH] IN BLOOD BY AUTOMATED COUNT: 16.1 % (ref 11.5–14.5)
EST. GFR  (AFRICAN AMERICAN): 33.4 ML/MIN/1.73 M^2
EST. GFR  (AFRICAN AMERICAN): 39.6 ML/MIN/1.73 M^2
EST. GFR  (NON AFRICAN AMERICAN): 28.9 ML/MIN/1.73 M^2
EST. GFR  (NON AFRICAN AMERICAN): 34.3 ML/MIN/1.73 M^2
GLUCOSE SERPL-MCNC: 151 MG/DL (ref 70–110)
GLUCOSE SERPL-MCNC: 300 MG/DL (ref 70–110)
HCT VFR BLD AUTO: 23.9 % (ref 40–54)
HCT VFR BLD AUTO: 25.4 % (ref 40–54)
HGB BLD-MCNC: 7.7 G/DL (ref 14–18)
HGB BLD-MCNC: 8 G/DL (ref 14–18)
IMM GRANULOCYTES # BLD AUTO: 0.01 K/UL (ref 0–0.04)
IMM GRANULOCYTES # BLD AUTO: 0.01 K/UL (ref 0–0.04)
IMM GRANULOCYTES NFR BLD AUTO: 0.2 % (ref 0–0.5)
IMM GRANULOCYTES NFR BLD AUTO: 0.2 % (ref 0–0.5)
INR PPP: 1.2 (ref 0.8–1.2)
LYMPHOCYTES # BLD AUTO: 1.5 K/UL (ref 1–4.8)
LYMPHOCYTES # BLD AUTO: 1.7 K/UL (ref 1–4.8)
LYMPHOCYTES NFR BLD: 29.3 % (ref 18–48)
LYMPHOCYTES NFR BLD: 33.5 % (ref 18–48)
MAGNESIUM SERPL-MCNC: 1.3 MG/DL (ref 1.6–2.6)
MAGNESIUM SERPL-MCNC: 1.4 MG/DL (ref 1.6–2.6)
MCH RBC QN AUTO: 28.6 PG (ref 27–31)
MCH RBC QN AUTO: 28.8 PG (ref 27–31)
MCHC RBC AUTO-ENTMCNC: 31.5 G/DL (ref 32–36)
MCHC RBC AUTO-ENTMCNC: 32.2 G/DL (ref 32–36)
MCV RBC AUTO: 90 FL (ref 82–98)
MCV RBC AUTO: 91 FL (ref 82–98)
MONOCYTES # BLD AUTO: 0.4 K/UL (ref 0.3–1)
MONOCYTES # BLD AUTO: 0.4 K/UL (ref 0.3–1)
MONOCYTES NFR BLD: 7.8 % (ref 4–15)
MONOCYTES NFR BLD: 8.1 % (ref 4–15)
NEUTROPHILS # BLD AUTO: 2.8 K/UL (ref 1.8–7.7)
NEUTROPHILS # BLD AUTO: 3.1 K/UL (ref 1.8–7.7)
NEUTROPHILS NFR BLD: 56.4 % (ref 38–73)
NEUTROPHILS NFR BLD: 60.5 % (ref 38–73)
NRBC BLD-RTO: 0 /100 WBC
NRBC BLD-RTO: 0 /100 WBC
PHOSPHATE SERPL-MCNC: 3.1 MG/DL (ref 2.7–4.5)
PHOSPHATE SERPL-MCNC: 3.2 MG/DL (ref 2.7–4.5)
PLATELET # BLD AUTO: 176 K/UL (ref 150–350)
PLATELET # BLD AUTO: 195 K/UL (ref 150–350)
PMV BLD AUTO: 10.1 FL (ref 9.2–12.9)
PMV BLD AUTO: 10.2 FL (ref 9.2–12.9)
POCT GLUCOSE: 207 MG/DL (ref 70–110)
POCT GLUCOSE: 235 MG/DL (ref 70–110)
POCT GLUCOSE: 260 MG/DL (ref 70–110)
POCT GLUCOSE: 276 MG/DL (ref 70–110)
POTASSIUM SERPL-SCNC: 3.3 MMOL/L (ref 3.5–5.1)
POTASSIUM SERPL-SCNC: 3.5 MMOL/L (ref 3.5–5.1)
PROT SERPL-MCNC: 3.4 G/DL (ref 6–8.4)
PROT SERPL-MCNC: 3.4 G/DL (ref 6–8.4)
PROTHROMBIN TIME: 12.4 SEC (ref 9–12.5)
RBC # BLD AUTO: 2.67 M/UL (ref 4.6–6.2)
RBC # BLD AUTO: 2.8 M/UL (ref 4.6–6.2)
SODIUM SERPL-SCNC: 133 MMOL/L (ref 136–145)
SODIUM SERPL-SCNC: 136 MMOL/L (ref 136–145)
WBC # BLD AUTO: 4.95 K/UL (ref 3.9–12.7)
WBC # BLD AUTO: 5.16 K/UL (ref 3.9–12.7)

## 2019-05-25 PROCEDURE — 84100 ASSAY OF PHOSPHORUS: CPT

## 2019-05-25 PROCEDURE — 99232 PR SUBSEQUENT HOSPITAL CARE,LEVL II: ICD-10-PCS | Mod: ,,, | Performed by: HOSPITALIST

## 2019-05-25 PROCEDURE — 36415 COLL VENOUS BLD VENIPUNCTURE: CPT

## 2019-05-25 PROCEDURE — 83735 ASSAY OF MAGNESIUM: CPT

## 2019-05-25 PROCEDURE — 25000003 PHARM REV CODE 250: Performed by: HOSPITALIST

## 2019-05-25 PROCEDURE — 85025 COMPLETE CBC W/AUTO DIFF WBC: CPT

## 2019-05-25 PROCEDURE — 11000001 HC ACUTE MED/SURG PRIVATE ROOM

## 2019-05-25 PROCEDURE — 80053 COMPREHEN METABOLIC PANEL: CPT

## 2019-05-25 PROCEDURE — G0378 HOSPITAL OBSERVATION PER HR: HCPCS

## 2019-05-25 PROCEDURE — 99232 SBSQ HOSP IP/OBS MODERATE 35: CPT | Mod: ,,, | Performed by: HOSPITALIST

## 2019-05-25 RX ORDER — MIDODRINE HYDROCHLORIDE 5 MG/1
5 TABLET ORAL 3 TIMES DAILY
Status: DISCONTINUED | OUTPATIENT
Start: 2019-05-25 | End: 2019-05-29 | Stop reason: HOSPADM

## 2019-05-25 RX ORDER — METOPROLOL SUCCINATE 50 MG/1
50 TABLET, EXTENDED RELEASE ORAL DAILY
Status: DISCONTINUED | OUTPATIENT
Start: 2019-05-26 | End: 2019-05-26

## 2019-05-25 RX ADMIN — INSULIN ASPART 2 UNITS: 100 INJECTION, SOLUTION INTRAVENOUS; SUBCUTANEOUS at 08:05

## 2019-05-25 RX ADMIN — APIXABAN 5 MG: 5 TABLET, FILM COATED ORAL at 08:05

## 2019-05-25 RX ADMIN — INSULIN ASPART 3 UNITS: 100 INJECTION, SOLUTION INTRAVENOUS; SUBCUTANEOUS at 09:05

## 2019-05-25 RX ADMIN — PANCRELIPASE 1 CAPSULE: 30000; 6000; 19000 CAPSULE, DELAYED RELEASE PELLETS ORAL at 09:05

## 2019-05-25 RX ADMIN — TORSEMIDE 20 MG: 20 TABLET ORAL at 09:05

## 2019-05-25 RX ADMIN — PANTOPRAZOLE SODIUM 40 MG: 40 TABLET, DELAYED RELEASE ORAL at 09:05

## 2019-05-25 RX ADMIN — ATORVASTATIN CALCIUM 40 MG: 20 TABLET, FILM COATED ORAL at 09:05

## 2019-05-25 RX ADMIN — INSULIN ASPART 2 UNITS: 100 INJECTION, SOLUTION INTRAVENOUS; SUBCUTANEOUS at 06:05

## 2019-05-25 RX ADMIN — INSULIN ASPART 3 UNITS: 100 INJECTION, SOLUTION INTRAVENOUS; SUBCUTANEOUS at 11:05

## 2019-05-25 RX ADMIN — PANCRELIPASE 2 CAPSULE: 30000; 6000; 19000 CAPSULE, DELAYED RELEASE PELLETS ORAL at 05:05

## 2019-05-25 RX ADMIN — CLOPIDOGREL 75 MG: 75 TABLET, FILM COATED ORAL at 09:05

## 2019-05-25 RX ADMIN — APIXABAN 5 MG: 5 TABLET, FILM COATED ORAL at 09:05

## 2019-05-25 RX ADMIN — MIDODRINE HYDROCHLORIDE 5 MG: 5 TABLET ORAL at 08:05

## 2019-05-25 RX ADMIN — TORSEMIDE 20 MG: 20 TABLET ORAL at 05:05

## 2019-05-25 RX ADMIN — MIDODRINE HYDROCHLORIDE 5 MG: 5 TABLET ORAL at 02:05

## 2019-05-25 RX ADMIN — PANCRELIPASE 1 CAPSULE: 30000; 6000; 19000 CAPSULE, DELAYED RELEASE PELLETS ORAL at 11:05

## 2019-05-25 RX ADMIN — VITAMIN D, TAB 1000IU (100/BT) 1000 UNITS: 25 TAB at 09:05

## 2019-05-25 RX ADMIN — METOPROLOL SUCCINATE 100 MG: 100 TABLET, EXTENDED RELEASE ORAL at 09:05

## 2019-05-25 RX ADMIN — TAMSULOSIN HYDROCHLORIDE 0.4 MG: 0.4 CAPSULE ORAL at 09:05

## 2019-05-25 NOTE — PLAN OF CARE
Problem: Adult Inpatient Plan of Care  Goal: Plan of Care Review  Outcome: Ongoing (interventions implemented as appropriate)  Pt had one normal bowel movement during day shift today.  He also was taken down to IR for paracentesis today where they took off 2.6 liters of fluid.  He tolerated it well and came back to floor, vitals stable, blood sugars stable, no need for coverage.  Pt does not complain of any pain or discomfort after procedure or throughout the day. No significant events except that pt does get hypotensive at times of 80's systolic.  He was given about 250 cc LR bolus yesterday during night shift.  Otherwise pt stated he feels fine.  He worked with PT/OT today and did well.  Pt is still weak and need between 1-2 person assist while getting to bedside commode and needs at least two person assist getting up from sitting to standing position.

## 2019-05-25 NOTE — PLAN OF CARE
Problem: Adult Inpatient Plan of Care  Goal: Plan of Care Review  Outcome: Ongoing (interventions implemented as appropriate)     05/25/19 1654   Plan of Care Review   Plan of Care Reviewed With patient   Progress declining     POC reviewed with patient; verbalizes understanding. Reinforcement may be needed. AAOx4. Compliant with medication administration regimen. Safety precautions in place; call light within reach, bed in low position, wheels locked, side rails up x2. Will continue to monitor.

## 2019-05-25 NOTE — PLAN OF CARE
PCP- DR. JEANNIE TRUONG    PT HAS A [employer] FAMILY SUPPORT WIFE     PHARMACY- [address] [place] [address]       05/24/19 1928   Discharge Assessment   Assessment Type Discharge Planning Assessment   Confirmed/corrected address and phone number on facesheet? Yes   Assessment information obtained from? Medical Record   Expected Length of Stay (days) 3   Communicated expected length of stay with patient/caregiver yes   Prior to hospitilization cognitive status: Alert/Oriented   Prior to hospitalization functional status: Assistive Equipment   Current cognitive status: Alert/Oriented   Current Functional Status: Assistive Equipment   Lives With spouse   Able to Return to Prior Arrangements yes   Is patient able to care for self after discharge? Yes   Patient's perception of discharge disposition home or selfcare   Readmission Within the Last 30 Days no previous admission in last 30 days   Patient currently being followed by outpatient case management? No   Patient currently receives any other outside agency services? No   Equipment Currently Used at Home walker, rolling;cane, straight;rollator   Do you have any problems affording any of your prescribed medications? No   Is the patient taking medications as prescribed? yes   Does the patient have transportation home? Yes   Transportation Anticipated car, drives self;family or friend will provide   Does the patient receive services at the [facility]? No   Discharge Plan A Home with family;Home Health   Discharge Plan B Home with family;Home Health   Patient/Family in Agreement with Plan yes

## 2019-05-25 NOTE — NURSING
"Upon gathering patient's orthostatic BP; patient stated to nurse, "put me back in the bed" upon getting BP sitting down on edge of the bed. Patient refuses to stand @ this time stating he is too weak. C/o SOB upon exertion. VS: BP 91/50 P 77 HR 98%. Dr. Larose notified. Will continue to monitor.   "

## 2019-05-26 LAB
ANION GAP SERPL CALC-SCNC: 7 MMOL/L (ref 8–16)
BASOPHILS # BLD AUTO: 0.04 K/UL (ref 0–0.2)
BASOPHILS NFR BLD: 0.7 % (ref 0–1.9)
BUN SERPL-MCNC: 41 MG/DL (ref 8–23)
CALCIUM SERPL-MCNC: 6.9 MG/DL (ref 8.7–10.5)
CHLORIDE SERPL-SCNC: 105 MMOL/L (ref 95–110)
CO2 SERPL-SCNC: 22 MMOL/L (ref 23–29)
CREAT SERPL-MCNC: 2.3 MG/DL (ref 0.5–1.4)
DIFFERENTIAL METHOD: ABNORMAL
EOSINOPHIL # BLD AUTO: 0.1 K/UL (ref 0–0.5)
EOSINOPHIL NFR BLD: 1.8 % (ref 0–8)
ERYTHROCYTE [DISTWIDTH] IN BLOOD BY AUTOMATED COUNT: 15.9 % (ref 11.5–14.5)
EST. GFR  (AFRICAN AMERICAN): 33.4 ML/MIN/1.73 M^2
EST. GFR  (NON AFRICAN AMERICAN): 28.9 ML/MIN/1.73 M^2
GLUCOSE SERPL-MCNC: 257 MG/DL (ref 70–110)
HCT VFR BLD AUTO: 24.3 % (ref 40–54)
HGB BLD-MCNC: 8 G/DL (ref 14–18)
IMM GRANULOCYTES # BLD AUTO: 0.02 K/UL (ref 0–0.04)
IMM GRANULOCYTES NFR BLD AUTO: 0.4 % (ref 0–0.5)
LACTATE SERPL-SCNC: 0.7 MMOL/L (ref 0.5–2.2)
LYMPHOCYTES # BLD AUTO: 2.2 K/UL (ref 1–4.8)
LYMPHOCYTES NFR BLD: 38.2 % (ref 18–48)
MCH RBC QN AUTO: 29.3 PG (ref 27–31)
MCHC RBC AUTO-ENTMCNC: 32.9 G/DL (ref 32–36)
MCV RBC AUTO: 89 FL (ref 82–98)
MONOCYTES # BLD AUTO: 0.4 K/UL (ref 0.3–1)
MONOCYTES NFR BLD: 7.4 % (ref 4–15)
NEUTROPHILS # BLD AUTO: 2.9 K/UL (ref 1.8–7.7)
NEUTROPHILS NFR BLD: 51.5 % (ref 38–73)
NRBC BLD-RTO: 0 /100 WBC
PLATELET # BLD AUTO: 185 K/UL (ref 150–350)
PMV BLD AUTO: 9.7 FL (ref 9.2–12.9)
POCT GLUCOSE: 184 MG/DL (ref 70–110)
POCT GLUCOSE: 210 MG/DL (ref 70–110)
POCT GLUCOSE: 234 MG/DL (ref 70–110)
POCT GLUCOSE: 237 MG/DL (ref 70–110)
POTASSIUM SERPL-SCNC: 3.5 MMOL/L (ref 3.5–5.1)
RBC # BLD AUTO: 2.73 M/UL (ref 4.6–6.2)
SODIUM SERPL-SCNC: 134 MMOL/L (ref 136–145)
WBC # BLD AUTO: 5.66 K/UL (ref 3.9–12.7)

## 2019-05-26 PROCEDURE — 85025 COMPLETE CBC W/AUTO DIFF WBC: CPT

## 2019-05-26 PROCEDURE — 11000001 HC ACUTE MED/SURG PRIVATE ROOM

## 2019-05-26 PROCEDURE — 25000003 PHARM REV CODE 250: Performed by: HOSPITALIST

## 2019-05-26 PROCEDURE — 83605 ASSAY OF LACTIC ACID: CPT

## 2019-05-26 PROCEDURE — 25000003 PHARM REV CODE 250: Performed by: PHYSICIAN ASSISTANT

## 2019-05-26 PROCEDURE — 99232 SBSQ HOSP IP/OBS MODERATE 35: CPT | Mod: ,,, | Performed by: HOSPITALIST

## 2019-05-26 PROCEDURE — 80048 BASIC METABOLIC PNL TOTAL CA: CPT

## 2019-05-26 PROCEDURE — G0378 HOSPITAL OBSERVATION PER HR: HCPCS

## 2019-05-26 PROCEDURE — 99232 PR SUBSEQUENT HOSPITAL CARE,LEVL II: ICD-10-PCS | Mod: ,,, | Performed by: HOSPITALIST

## 2019-05-26 RX ORDER — METOPROLOL SUCCINATE 25 MG/1
25 TABLET, EXTENDED RELEASE ORAL DAILY
Status: DISCONTINUED | OUTPATIENT
Start: 2019-05-26 | End: 2019-05-27

## 2019-05-26 RX ORDER — MIDODRINE HYDROCHLORIDE 5 MG/1
10 TABLET ORAL ONCE
Status: COMPLETED | OUTPATIENT
Start: 2019-05-26 | End: 2019-05-26

## 2019-05-26 RX ADMIN — TAMSULOSIN HYDROCHLORIDE 0.4 MG: 0.4 CAPSULE ORAL at 08:05

## 2019-05-26 RX ADMIN — APIXABAN 5 MG: 5 TABLET, FILM COATED ORAL at 08:05

## 2019-05-26 RX ADMIN — PANCRELIPASE 2 CAPSULE: 30000; 6000; 19000 CAPSULE, DELAYED RELEASE PELLETS ORAL at 08:05

## 2019-05-26 RX ADMIN — ACETAMINOPHEN 650 MG: 325 TABLET ORAL at 07:05

## 2019-05-26 RX ADMIN — INSULIN ASPART 2 UNITS: 100 INJECTION, SOLUTION INTRAVENOUS; SUBCUTANEOUS at 05:05

## 2019-05-26 RX ADMIN — ATORVASTATIN CALCIUM 40 MG: 20 TABLET, FILM COATED ORAL at 08:05

## 2019-05-26 RX ADMIN — MIDODRINE HYDROCHLORIDE 10 MG: 5 TABLET ORAL at 01:05

## 2019-05-26 RX ADMIN — PANCRELIPASE 2 CAPSULE: 30000; 6000; 19000 CAPSULE, DELAYED RELEASE PELLETS ORAL at 11:05

## 2019-05-26 RX ADMIN — MIDODRINE HYDROCHLORIDE 5 MG: 5 TABLET ORAL at 08:05

## 2019-05-26 RX ADMIN — INSULIN ASPART 2 UNITS: 100 INJECTION, SOLUTION INTRAVENOUS; SUBCUTANEOUS at 08:05

## 2019-05-26 RX ADMIN — INSULIN ASPART 2 UNITS: 100 INJECTION, SOLUTION INTRAVENOUS; SUBCUTANEOUS at 11:05

## 2019-05-26 RX ADMIN — PANCRELIPASE 2 CAPSULE: 30000; 6000; 19000 CAPSULE, DELAYED RELEASE PELLETS ORAL at 05:05

## 2019-05-26 RX ADMIN — TORSEMIDE 20 MG: 20 TABLET ORAL at 05:05

## 2019-05-26 RX ADMIN — PANTOPRAZOLE SODIUM 40 MG: 40 TABLET, DELAYED RELEASE ORAL at 09:05

## 2019-05-26 RX ADMIN — CLOPIDOGREL 75 MG: 75 TABLET, FILM COATED ORAL at 08:05

## 2019-05-26 RX ADMIN — METOPROLOL SUCCINATE 25 MG: 25 TABLET, EXTENDED RELEASE ORAL at 09:05

## 2019-05-26 RX ADMIN — ONDANSETRON 8 MG: 8 TABLET, ORALLY DISINTEGRATING ORAL at 07:05

## 2019-05-26 RX ADMIN — TORSEMIDE 20 MG: 20 TABLET ORAL at 08:05

## 2019-05-26 RX ADMIN — VITAMIN D, TAB 1000IU (100/BT) 1000 UNITS: 25 TAB at 08:05

## 2019-05-26 RX ADMIN — MIDODRINE HYDROCHLORIDE 5 MG: 5 TABLET ORAL at 03:05

## 2019-05-26 NOTE — PROGRESS NOTES
"Hospital Medicine  Progress note    [place] [facility] [place] [facility] [place] Mary Larose MD  Admit Date: 5/22/2019  [place] 5/27/2019  Length of Stay:  LOS: 4 days   Code status: Full Code    Principal Problem:  Symptomatic anemia    Overview:    Interval hx: Lisseth seen and examined at bedside, AKBAR. Appeared agitated and belligerent this morning stemming from his wife and sister "badgering the hell out of me" about his salt intake. He had multiple non-medical complaints, which I allowed him to vent. I discussed my concern with him that I did not feel that he was strong enough to discharge home with only home health considering he was unable to stand yesterday. He noted, "Well I feel I'm getting stronger as my leg swelling goes down". His BP remains tenuous, and I have made some medication adjustments which require further monitoring. Plan is for PT re-eval in morning to see if SNF may be more appropriate, though patient is not 100% sure he would be willing to go. Will speak to his family about it and readdress tomorrow.     ROS   Constitutional: Negative for chills, fatigue, fever.   HENT: Negative for sore throat, trouble swallowing.    Eyes: Negative for photophobia, visual disturbance.   Respiratory: Negative for cough, +ve shortness of breath on exertion.    Cardiovascular: Negative for chest pain, palpitations, +ve leg swelling (chronic lymphedema).   Gastrointestinal: Negative for abdominal pain, constipation, diarrhea, nausea, vomiting.   Endocrine: Negative for cold intolerance, heat intolerance.   Genitourinary: Negative for dysuria, frequency.   Musculoskeletal: Negative for arthralgias, myalgias.   Skin: Negative for rash, wound, erythema. +ve easy bruising.   Neurological: Negative for dizziness, syncope, +ve weakness, postural light-headedness.   Psychiatric/Behavioral: Negative for confusion, hallucinations, anxiety  All other systems reviewed and are negative.    PEx  Temp:  [97.3 °F (36.3 °C)-98.3 °F (36.8 °C)] "   Pulse:  [55-85]   Resp:  [18-20]   BP: (78-95)/(48-55)   SpO2:  [99 %-100 %]     Intake/Output Summary (Last 24 hours) at 5/26/2019 1049  Last data filed at 5/25/2019 2300  Gross per 24 hour   Intake --   Output 950 ml   Net -950 ml     Constitutional: Appears well-developed and well-nourished. Obese.  Head: Normocephalic and atraumatic.   Mouth/Throat: Oropharynx is clear and moist.   Eyes: EOM are normal. Pupils are equal, round, and reactive to light. No scleral icterus.   Neck: Normal range of motion. Neck supple.   Cardiovascular: Normal rate and regular rhythm.  No murmur heard. R port in place.  Pulmonary/Chest: Effort normal and breath sounds normal. No respiratory distress. No wheezes, rales, or rhonchi  Abdominal: Soft. Bowel sounds are normal.  No distension or tenderness  Musculoskeletal: Normal range of motion. +ve edema. B/L LE wrapped.   Neurological: Alert and oriented to person, place, and time.   Skin: Skin is warm and dry. Multiple ecchymosis b/l UE.  Psychiatric: Normal mood and affect. Behavior is normal.     Recent Labs   Lab 05/24/19  0530 05/25/19  0932 05/26/19  0119   WBC 6.30 5.16 5.66   HGB 8.6* 8.0* 8.0*   HCT 26.1* 25.4* 24.3*    195 185     Recent Labs   Lab 05/22/19  1531 05/23/19  0448 05/24/19  0530 05/25/19  0932 05/26/19  0907   * 134* 135* 133* 134*   K 3.6 3.2* 3.2* 3.5 3.5    107 107 106 105   CO2 19* 22* 23 21* 22*   BUN 50* 46* 43* 42* 41*   CREATININE 2.3* 2.0* 1.9* 2.3* 2.3*   * 67* 120* 300* 257*   CALCIUM 6.8* 7.1* 6.9* 6.8* 6.9*   MG 1.7 1.6 1.5* 1.3*  --    PHOS 4.6* 4.3 3.8 3.1  --    LIPASE <3*  --   --   --   --      Recent Labs   Lab 05/22/19  1531 05/23/19  0448 05/24/19  0530 05/25/19  0932   ALKPHOS 117 116 121 106   ALT 19 21 25 25   AST 20 22 33 29   ALBUMIN 1.7* 1.7* 1.4* 1.6*   PROT 3.9* 4.0* 3.5* 3.4*   BILITOT 0.3 0.4 0.4 0.4   INR 1.1 1.1 1.2  --         No results for input(s): CPK, CPKMB, MB, TROPONINI in the last 72  hours.  Recent Labs   Lab 05/24/19  1720 05/24/19  2047 05/25/19  0927 05/25/19  1109 05/25/19  1800 05/25/19 2039   POCTGLUCOSE 180* 222* 276* 260* 207* 235*     Hemoglobin A1C   Date Value Ref Range Status   05/23/2019 7.1 (H) 4.0 - 5.6 % Final     Comment:     ADA Screening Guidelines:  5.7-6.4%  Consistent with prediabetes  >or=6.5%  Consistent with diabetes  High levels of fetal hemoglobin interfere with the HbA1C  assay. Heterozygous hemoglobin variants (HbS, HgC, etc)do  not significantly interfere with this assay.   However, presence of multiple variants may affect accuracy.     03/18/2019 7.4 (H) 4.0 - 5.6 % Final     Comment:     ADA Screening Guidelines:  5.7-6.4%  Consistent with prediabetes  >or=6.5%  Consistent with diabetes  High levels of fetal hemoglobin interfere with the HbA1C  assay. Heterozygous hemoglobin variants (HbS, HgC, etc)do  not significantly interfere with this assay.   However, presence of multiple variants may affect accuracy.     01/28/2019 6.9 (H) 4.0 - 5.6 % Final     Comment:     ADA Screening Guidelines:  5.7-6.4%  Consistent with prediabetes  >or=6.5%  Consistent with diabetes  High levels of fetal hemoglobin interfere with the HbA1C  assay. Heterozygous hemoglobin variants (HbS, HgC, etc)do  not significantly interfere with this assay.   However, presence of multiple variants may affect accuracy.         Scheduled Meds:   apixaban  5 mg Oral BID    atorvastatin  40 mg Oral Daily    clopidogrel  75 mg Oral Daily    lipase-protease-amylase 6,000-19,000-30,000 units  2 capsule Oral TID WM    metoprolol succinate  25 mg Oral Daily    midodrine  5 mg Oral TID    pantoprazole  40 mg Oral Daily    tamsulosin  1 capsule Oral Daily    torsemide  20 mg Oral BID    vitamin D  1,000 Units Oral Daily     Continuous Infusions:  As Needed:  sodium chloride, acetaminophen, dextrose 50%, dextrose 50%, glucagon (human recombinant), glucose, glucose, insulin aspart U-100, ondansetron,  sodium chloride 0.9%, sodium chloride 0.9%    Active Hospital Problems    Diagnosis  POA    *Symptomatic anemia [D64.9]  Yes    Alteration in skin integrity [R23.9]  Yes    Venous stasis dermatitis of both lower extremities [I87.2]  Yes    Stage 3 chronic kidney disease [N18.3]  Yes    Acute renal failure with specified lesion [N17.8]  Yes    Hypertension associated with diabetes [E11.59, I10]  Yes    Paroxysmal atrial fibrillation [I48.0]  Yes    Ascites [R18.8]  Yes    DM type 2 with diabetic peripheral neuropathy [E11.42]  Yes    Hypoalbuminemia [E88.09]  Yes    Acute on chronic combined systolic and diastolic heart failure [I50.43]  Yes     1-28-19  Left Ventricle Moderate decreased ejection fraction at 30%. Normal left ventricular cavity size. Normal wall thickness observed. Grade I (mild) left ventricular diastolic dysfunction consistent with impaired relaxation. Normal left atrial pressure. Moderate, global hypokinesis(see wall scoring diagram).   Right Ventricle Normal cavity size, wall thickness and ejection fraction. Wall motion normal.   Left Atrium Normal atrial size. .   Right Atrium Normal atrial size.   Aortic Valve The valve is trileaflet. Aortic valve sclerosis is mild. No stenosis. No regurgitation. Mobility is normal   Mitral Valve Normal valve structure. No stenosis. No regurgitation. Normal leaflet mobility.   Tricuspid Valve Tricuspid valve not well visualized. Trace regurgitation.   Pulmonic Valve The pulmonic valve was not well visualized.   IVC/SVC Inferior vena cava is not well visualized.   Ascending Aorta The aortic root and ascending aorta are normal in size.   Pericardium No pericardial effusion.           Anasarca [R60.1]  Yes    Chronic pancreatitis [K86.1]  Yes    Morbid obesity with BMI of 50.0-59.9, adult [E66.01, Z68.43]  Not Applicable      Resolved Hospital Problems   No resolved problems to display.         Assessment and Plan    # Symptomatic Anemia  - no  evidence of GI bleed. Previous iron panel in march indicative of AoCD.   - transfused 1 unit of PRBC  - Hgb stable   - Nutrition consult placed.      # Acute on chronic systolic and diastolic heart failure  - TTE ordered, showed normal EF and diastolic function  - lasix 60 mg IV transitioned to Oral Torsemide BID as per nephrology   - daily weights  - strict I/Os     # Ascites  - The etiology of this patient's significant ascites remains elusive. He has seen both hepatology and cardiology. A liver biopsy was -ve for clinically significant cirrhosis and portal hypertension. A 2D echo during this evaluation did not show any significant abnormalities, though it should be noted that previous studies have identified both diastolic and systolic dysfunction. He has a history of CAD with multiple stents placed in the past which make CM likely. He is on a beta blocker and diuretic, but his BP is making it difficult to adequately titrate these to maximal effect.   - h/o multiple paracenteses, most recently 5/24 (-2L)  - other potential etiology includes chronic pancreatitis (leak?) vs peritoneal carcinomatosis though fluid analysis does not really reflect this. Previous amylase x2 wnl. Cytology did not show malignant cells.   - will need recurrent IR labs on a q weekly or q bimonthly basis.   - have placed nutrition consult due to hypoalbuminemia, h/o gastric bypass, and presumably poor absorption in the setting of chronic pancreatitis.     # Essential HTN   - hypotensive throughout hospitalization. Metoprolol titrated down. Continuing torsemide as directed by nephrology.     # DM2 with HTN and neuropathy and CKD stage 3  - Hba1c 7.1  - SSI; on home insulin      # Paroxysmal atrial fibrillation  - cont toprol and eliquis  - toprol titrated down due to persistent hypotension, weakness and dizziness.     # Morbid Obesity  Body mass index is 44.95 kg/m².  Nutrition consult placed. Weight loss recommended.     #Hypoalbuminemia    - UA negative for protein  - Liver biopsy negative for cirrhosis   - might benefit from GI referral for EGD/Colonoscopy  - nutrition consult    # LE Venous stasis ulcers  - wound care consult hydrofera blue placed to left leg     # ELIEZER on CKD stage 3  - urine lytes and nephrology consult as per patient request; renal U/S showed no hydronephrosis   - nephrology consulted     # Chronic pancreatitis  - cont creon  - past h/o alcohol abuse  - could explain recurrent ascites in the absense of cirrhosis     High Risk Conditions  DM, BERHANE, HTN, Extreme Obesity      Diet: diabetic diet   GI PPx:   DVT PPx:    Anticoagulants   Medication Route Frequency    apixaban tablet 5 mg Oral BID       Goals of Care: Full code   Discharge plan: pending IR paracentesis and Pt./OT recs     Time (minutes) spent in care of the patient (Greater than 1/2 spent in direct face-to-face contact) 45 minutes     Mary Larose MD  [id number]

## 2019-05-26 NOTE — PLAN OF CARE
Problem: Adult Inpatient Plan of Care  Goal: Plan of Care Review  Outcome: Ongoing (interventions implemented as appropriate)  No acute events during this shift.     Low-trending systolic and diastolic Bps throughout shift, but pt remains asymptomatic. Midodrine given once to treat hypotension. STAT CBC and Lactate WNL. Pt educated on high fall risk due to hypotension and to not attempt to get out of bed alone. Verbalized understanding.    Skin dressings maintained.

## 2019-05-26 NOTE — PROGRESS NOTES
Hospital Medicine  Progress note    Team: [facility] [place] [facility] R Mary Larose MD  Admit Date: 5/22/2019  [place] 5/27/2019  Length of Stay:  LOS: 3 days   Code status: Full Code    Principal Problem:  Symptomatic anemia    Overview:    Interval hx: Patinet seen and examined at bedside, AKBAR. Cr bumped following IR para 5/24. Did receive albumin after 2L removal. Sister was updated over phone. Nutrition consult placed. Patient very debilitated and does not appear appropriate for HH PT/OT, as he is barely able to stand on my evaluation. Will have PT/OT re-evaluate.     ROS   Constitutional: Negative for chills, fatigue, fever.   HENT: Negative for sore throat, trouble swallowing.    Eyes: Negative for photophobia, visual disturbance.   Respiratory: Negative for cough, +ve shortness of breath on exertion.    Cardiovascular: Negative for chest pain, palpitations, +ve leg swelling (chronic lymphedema).   Gastrointestinal: Negative for abdominal pain, constipation, diarrhea, nausea, vomiting.   Endocrine: Negative for cold intolerance, heat intolerance.   Genitourinary: Negative for dysuria, frequency.   Musculoskeletal: Negative for arthralgias, myalgias.   Skin: Negative for rash, wound, erythema. +ve easy bruising.   Neurological: Negative for dizziness, syncope, +ve weakness, postural light-headedness.   Psychiatric/Behavioral: Negative for confusion, hallucinations, anxiety  All other systems reviewed and are negative.    PEx  Temp:  [96.3 °F (35.7 °C)-98.3 °F (36.8 °C)]   Pulse:  [55-97]   Resp:  [18-20]   BP: (87-98)/(48-55)   SpO2:  [98 %-100 %]     Intake/Output Summary (Last 24 hours) at 5/25/2019 2118  Last data filed at 5/25/2019 1901  Gross per 24 hour   Intake 120 ml   Output 1050 ml   Net -930 ml     Constitutional: Appears well-developed and well-nourished. Obese.  Head: Normocephalic and atraumatic.   Mouth/Throat: Oropharynx is clear and moist.   Eyes: EOM are normal. Pupils are equal, round, and reactive  to light. No scleral icterus.   Neck: Normal range of motion. Neck supple.   Cardiovascular: Normal rate and regular rhythm.  No murmur heard. R port in place.  Pulmonary/Chest: Effort normal and breath sounds normal. No respiratory distress. No wheezes, rales, or rhonchi  Abdominal: Soft. Bowel sounds are normal.  No distension or tenderness  Musculoskeletal: Normal range of motion. +ve edema. B/L LE wrapped.   Neurological: Alert and oriented to person, place, and time.   Skin: Skin is warm and dry. Multiple ecchymosis b/l UE.  Psychiatric: Normal mood and affect. Behavior is normal.     Recent Labs   Lab 05/23/19  0448 05/24/19  0530 05/25/19  0932   WBC 5.89 6.30 5.16   HGB 8.8* 8.6* 8.0*   HCT 26.0* 26.1* 25.4*    223 195     Recent Labs   Lab 05/22/19  1531 05/23/19  0448 05/24/19  0530 05/25/19  0932   * 134* 135* 133*   K 3.6 3.2* 3.2* 3.5    107 107 106   CO2 19* 22* 23 21*   BUN 50* 46* 43* 42*   CREATININE 2.3* 2.0* 1.9* 2.3*   * 67* 120* 300*   CALCIUM 6.8* 7.1* 6.9* 6.8*   MG 1.7 1.6 1.5* 1.3*   PHOS 4.6* 4.3 3.8 3.1   LIPASE <3*  --   --   --      Recent Labs   Lab 05/22/19  1531 05/23/19  0448 05/24/19  0530 05/25/19  0932   ALKPHOS 117 116 121 106   ALT 19 21 25 25   AST 20 22 33 29   ALBUMIN 1.7* 1.7* 1.4* 1.6*   PROT 3.9* 4.0* 3.5* 3.4*   BILITOT 0.3 0.4 0.4 0.4   INR 1.1 1.1 1.2  --         No results for input(s): CPK, CPKMB, MB, TROPONINI in the last 72 hours.  Recent Labs   Lab 05/24/19  1720 05/24/19 2047 05/25/19  0927 05/25/19  1109 05/25/19  1800 05/25/19 2039   POCTGLUCOSE 180* 222* 276* 260* 207* 235*     Hemoglobin A1C   Date Value Ref Range Status   05/23/2019 7.1 (H) 4.0 - 5.6 % Final     Comment:     ADA Screening Guidelines:  5.7-6.4%  Consistent with prediabetes  >or=6.5%  Consistent with diabetes  High levels of fetal hemoglobin interfere with the HbA1C  assay. Heterozygous hemoglobin variants (HbS, HgC, etc)do  not significantly interfere with this  assay.   However, presence of multiple variants may affect accuracy.     03/18/2019 7.4 (H) 4.0 - 5.6 % Final     Comment:     ADA Screening Guidelines:  5.7-6.4%  Consistent with prediabetes  >or=6.5%  Consistent with diabetes  High levels of fetal hemoglobin interfere with the HbA1C  assay. Heterozygous hemoglobin variants (HbS, HgC, etc)do  not significantly interfere with this assay.   However, presence of multiple variants may affect accuracy.     01/28/2019 6.9 (H) 4.0 - 5.6 % Final     Comment:     ADA Screening Guidelines:  5.7-6.4%  Consistent with prediabetes  >or=6.5%  Consistent with diabetes  High levels of fetal hemoglobin interfere with the HbA1C  assay. Heterozygous hemoglobin variants (HbS, HgC, etc)do  not significantly interfere with this assay.   However, presence of multiple variants may affect accuracy.         Scheduled Meds:   apixaban  5 mg Oral BID    atorvastatin  40 mg Oral Daily    clopidogrel  75 mg Oral Daily    lipase-protease-amylase 6,000-19,000-30,000 units  2 capsule Oral TID WM    [START ON 5/26/2019] metoprolol succinate  50 mg Oral Daily    midodrine  5 mg Oral TID    pantoprazole  40 mg Oral Daily    tamsulosin  1 capsule Oral Daily    torsemide  20 mg Oral BID    vitamin D  1,000 Units Oral Daily     Continuous Infusions:  As Needed:  sodium chloride, acetaminophen, dextrose 50%, dextrose 50%, glucagon (human recombinant), glucose, glucose, insulin aspart U-100, ondansetron, sodium chloride 0.9%, sodium chloride 0.9%    Active Hospital Problems    Diagnosis  POA    *Symptomatic anemia [D64.9]  Yes    Alteration in skin integrity [R23.9]  Yes    Venous stasis dermatitis of both lower extremities [I87.2]  Yes    Stage 3 chronic kidney disease [N18.3]  Yes    Acute renal failure with specified lesion [N17.8]  Yes    Hypertension associated with diabetes [E11.59, I10]  Yes    Paroxysmal atrial fibrillation [I48.0]  Yes    Ascites [R18.8]  Yes    DM type 2 with  diabetic peripheral neuropathy [E11.42]  Yes    Hypoalbuminemia [E88.09]  Yes    Acute on chronic combined systolic and diastolic heart failure [I50.43]  Yes     1-28-19  Left Ventricle Moderate decreased ejection fraction at 30%. Normal left ventricular cavity size. Normal wall thickness observed. Grade I (mild) left ventricular diastolic dysfunction consistent with impaired relaxation. Normal left atrial pressure. Moderate, global hypokinesis(see wall scoring diagram).   Right Ventricle Normal cavity size, wall thickness and ejection fraction. Wall motion normal.   Left Atrium Normal atrial size. .   Right Atrium Normal atrial size.   Aortic Valve The valve is trileaflet. Aortic valve sclerosis is mild. No stenosis. No regurgitation. Mobility is normal   Mitral Valve Normal valve structure. No stenosis. No regurgitation. Normal leaflet mobility.   Tricuspid Valve Tricuspid valve not well visualized. Trace regurgitation.   Pulmonic Valve The pulmonic valve was not well visualized.   IVC/SVC Inferior vena cava is not well visualized.   Ascending Aorta The aortic root and ascending aorta are normal in size.   Pericardium No pericardial effusion.           Anasarca [R60.1]  Yes    Chronic pancreatitis [K86.1]  Yes    Morbid obesity with BMI of 50.0-59.9, adult [E66.01, Z68.43]  Not Applicable      Resolved Hospital Problems   No resolved problems to display.         Assessment and Plan    # Symptomatic Anemia  - no evidence of GI bleed  - transfusing 1 unit of PRBC  - Hgb stable         # Acute on chronic systolic and diastolic heart failure  - TTE ordered, showed normal EF and diastolic function  - lasix 60 mg IV transitioned to Oral Torsemide BID as per nephrology   - daily weights  - strict I/Os     # Ascites  - its been unclear if this has been from liver disease of cardiac disease   - patient had a recent liver biopsy negative for cirrhosis  - s/p paracentesis as outpatient with 2L removed; IR reordered for  today    - other potential etiology includes chronic pancreatitis (leak?) vs peritoneal carcinomatosis though fluid analysis does not really reflect this. Previous amylase x2 wnl. Cytology did not show malignant cells.   - will need recurrent IR labs on a q weekly or q bimonthly basis.   - have placed nutrition consult due to hypoalbuminemia, h/o gastric bypass, and presumably poor absorption in the setting of chronic pancreatitis.     # Essential HTN   - cont home BP meds     # DM2 with HTN and neuropathy and CKD stage 3  - Hba1c 7.1  - SSI; on home insulin      # Paroxysmal atrial fibrillation  - cont toprol and eliquis  - toprol titrated down due to persistent hypotension, weakness and dizziness.     # Morbid Obesity  Body mass index is 44.95 kg/m².    #Hypoalbuminemia   - UA negative for protein  - Liver biopsy negative for cirrhosis   - might benefit from GI referral for EGD/Colonoscopy  - nutrition consult    # LE Venous stasis ulcers  - wound care consult hydrofera blue placed to left leg     # ELIEZER on CKD stage 3  - urine lytes and nephrology consult as per patient request; renal U/S showed no hydronephrosis   - nephrology consulted     # Chronic pancreatitis  - cont creon  - past h/o alcohol abuse  - could explain recurrent ascites in the absense of cirrhosis     High Risk Conditions  DM, BERHANE, HTN, Extreme Obesity      Diet: diabetic diet   GI PPx:   DVT PPx:    Anticoagulants   Medication Route Frequency    apixaban tablet 5 mg Oral BID       Goals of Care: Full code   Discharge plan: pending IR paracentesis and Pt./OT recs     Time (minutes) spent in care of the patient (Greater than 1/2 spent in direct face-to-face contact) 35 minutes     Mary Larose MD  [id number]

## 2019-05-26 NOTE — PLAN OF CARE
Problem: Adult Inpatient Plan of Care  Goal: Plan of Care Review  Recommendations     Recommendation/Intervention: 1.) Suggest renal/DM diet. 2.) May benefit from thiamine and B complex vitamins.   Goals: 1.) Pt to consume/tolerate >75% EEN and EPN. 2.) Wt maintanence  Nutrition Goal Status: new  Communication of RD Recs: (POC)    Assessment and Plan  Nutrition Problem  Altered nutrition related labs  Limited adherence to nutritional recommendations     Related to (etiology):   Renal dysfunction  PO intake     Signs and Symptoms (as evidenced by):   BUN 41, Cr 2.3, GFR 33.4  Not following a low Na diet at home     Interventions/Recommendations (treatment strategy):  Collaboration of other providers & referral of care     Nutrition Diagnosis Status:   New

## 2019-05-26 NOTE — PLAN OF CARE
Problem: Adult Inpatient Plan of Care  Goal: Plan of Care Review  Outcome: Ongoing (interventions implemented as appropriate)     05/26/19 1506   Plan of Care Review   Plan of Care Reviewed With patient;spouse   Progress improving     POC reviewed with patient and patient's wife; both verbalize understanding, reinforcement needed. Compliant with medication administration regimen. No s/sx of pain/distress noted. Safety precautions in place; call light within reach, bed in low position, wheels locked, side rails up x2. Will continue to monitor.

## 2019-05-26 NOTE — CONSULTS
"  [facility]  Adult Nutrition  Consult Note    SUMMARY     Recommendations    Recommendation/Intervention: 1.) Suggest renal/DM diet. 2.) May benefit from thiamine and B complex vitamins.   Goals: 1.) Pt to consume/tolerate >75% EEN and EPN. 2.) Wt maintanence  Nutrition Goal Status: new  Communication of RD Recs: (POC)    Reason for Assessment    Reason For Assessment: consult  Diagnosis: other (see comments)(symptomatic anemia)  Relevant Medical History: hydrocele, diverticulosis, atherosclerosis, ETOH abuse, DM2, CHF, CAD, anasarca, HTN, s/p gastric bypass, skin Ca  Interdisciplinary Rounds: did not attend  General Information Comments: Pt sitting up in bed reports good appetite. Observed 100% of bkfst consumed. He denies GI distress or chew/swallowing issues. Pt reports no recent wt changes. Pt reports following a DM diet at home and moderately watches his salt intake. Pt continues to consume frozen meals. Reviewed low Na diet. Pt reluctant to change at this time. NFPE performed; appears well nourished. S/p gastric bypass 2015-# at that time.   Nutrition Discharge Planning: d/c on low Na/DM diet    Nutrition Risk Screen    Nutrition Risk Screen: no indicators present    Nutrition/Diet History    Spiritual, Cultural Beliefs, Jewish Practices, Values that Affect Care: no    Anthropometrics    Temp: 97.3 °F (36.3 °C)  Height Method: Stated  Height: 5' 7" (170.2 cm)  Height (inches): 67 in  Weight Method: Bed Scale  Weight: 120 kg (264 lb 8.8 oz)  Weight (lb): 264.55 lb  Ideal Body Weight (IBW), Male: 148 lb  % Ideal Body Weight, Male (lb): 193.65 lb  BMI (Calculated): 45       Lab/Procedures/Meds    Pertinent Labs Reviewed: reviewed  Pertinent Labs Comments: Na 134, BUN 41, Cr 2.3, GFR 33.4, Glu 257, Ca 6.9, HbA1C 7.1  Pertinent Medications Reviewed: reviewed  Pertinent Medications Comments: statin, protonix, vitamin D      Estimated/Assessed Needs    Weight Used For Calorie " Calculations: 120 kg (264 lb 8.8 oz)  Energy Calorie Requirements (kcal): 2435  Energy Need Method: Gordon-St Jeor(x 1.25)  Protein Requirements: (g/day)  Weight Used For Protein Calculations: 67.2 kg (148 lb 2.4 oz)(1.2-1.5 g/kg of IBW)     Estimated Fluid Requirement Method: RDA Method(per MD)  RDA Method (mL): 2435  CHO Requirement: 50% of total kcal      Nutrition Prescription Ordered    Current Diet Order: diabetic    Evaluation of Received Nutrient/Fluid Intake    I/O: -4.8L since admit  Comments: LBM 5/25  % Intake of Estimated Energy Needs: 75 - 100 %  % Meal Intake: 75 - 100 %    Nutrition Risk    Level of Risk/Frequency of Follow-up: low     Assessment and Plan  Nutrition Problem  Altered nutrition related labs  Limited adherence to nutritional recommendations    Related to (etiology):   Renal dysfunction  PO intake    Signs and Symptoms (as evidenced by):   BUN 41, Cr 2.3, GFR 33.4  No following a low Na diet at home    Interventions/Recommendations (treatment strategy):  Collaboration of other providers & referral of care    Nutrition Diagnosis Status:   New        Monitor and Evaluation    Food and Nutrient Intake: energy intake, food and beverage intake  Food and Nutrient Adminstration: diet order  Knowledge/Beliefs/Attitudes: food and nutrition knowledge/skill  Physical Activity and Function: nutrition-related ADLs and IADLs  Anthropometric Measurements: weight, weight change, body mass index  Biochemical Data, Medical Tests and Procedures: electrolyte and renal panel, gastrointestinal profile, glucose/endocrine profile, lipid profile  Nutrition-Focused Physical Findings: overall appearance     Malnutrition Assessment                 Orbital Region (Subcutaneous Fat Loss): well nourished  Upper Arm Region (Subcutaneous Fat Loss): well nourished   Jew Region (Muscle Loss): well nourished  Clavicle Bone Region (Muscle Loss): well nourished  Dorsal Hand (Muscle Loss): well nourished  Anterior  Thigh Region (Muscle Loss): well nourished  Posterior Calf Region (Muscle Loss): well nourished       Subcutaneous Fat Loss (Final Summary): well nourished  Muscle Loss Evaluation (Final Summary): well nourished         Nutrition Follow-Up    RD Follow-up?: Yes

## 2019-05-27 LAB
ANION GAP SERPL CALC-SCNC: 7 MMOL/L (ref 8–16)
BACTERIA SPEC ANAEROBE CULT: NORMAL
BUN SERPL-MCNC: 41 MG/DL (ref 8–23)
CALCIUM SERPL-MCNC: 7 MG/DL (ref 8.7–10.5)
CHLORIDE SERPL-SCNC: 105 MMOL/L (ref 95–110)
CO2 SERPL-SCNC: 23 MMOL/L (ref 23–29)
CREAT SERPL-MCNC: 2.6 MG/DL (ref 0.5–1.4)
EST. GFR  (AFRICAN AMERICAN): 28.8 ML/MIN/1.73 M^2
EST. GFR  (NON AFRICAN AMERICAN): 24.9 ML/MIN/1.73 M^2
GLUCOSE SERPL-MCNC: 246 MG/DL (ref 70–110)
MAGNESIUM SERPL-MCNC: 1.5 MG/DL (ref 1.6–2.6)
POCT GLUCOSE: 160 MG/DL (ref 70–110)
POCT GLUCOSE: 261 MG/DL (ref 70–110)
POTASSIUM SERPL-SCNC: 3.3 MMOL/L (ref 3.5–5.1)
SODIUM SERPL-SCNC: 135 MMOL/L (ref 136–145)

## 2019-05-27 PROCEDURE — 80048 BASIC METABOLIC PNL TOTAL CA: CPT

## 2019-05-27 PROCEDURE — 25000003 PHARM REV CODE 250: Performed by: HOSPITALIST

## 2019-05-27 PROCEDURE — 11000001 HC ACUTE MED/SURG PRIVATE ROOM

## 2019-05-27 PROCEDURE — 99232 PR SUBSEQUENT HOSPITAL CARE,LEVL II: ICD-10-PCS | Mod: ,,, | Performed by: HOSPITALIST

## 2019-05-27 PROCEDURE — 97530 THERAPEUTIC ACTIVITIES: CPT

## 2019-05-27 PROCEDURE — 99232 SBSQ HOSP IP/OBS MODERATE 35: CPT | Mod: ,,, | Performed by: HOSPITALIST

## 2019-05-27 PROCEDURE — 83735 ASSAY OF MAGNESIUM: CPT

## 2019-05-27 PROCEDURE — 97110 THERAPEUTIC EXERCISES: CPT

## 2019-05-27 PROCEDURE — 97116 GAIT TRAINING THERAPY: CPT

## 2019-05-27 PROCEDURE — G0378 HOSPITAL OBSERVATION PER HR: HCPCS

## 2019-05-27 PROCEDURE — 97535 SELF CARE MNGMENT TRAINING: CPT

## 2019-05-27 PROCEDURE — 63600175 PHARM REV CODE 636 W HCPCS: Performed by: HOSPITALIST

## 2019-05-27 RX ORDER — FUROSEMIDE 10 MG/ML
60 INJECTION INTRAMUSCULAR; INTRAVENOUS 2 TIMES DAILY
Status: DISCONTINUED | OUTPATIENT
Start: 2019-05-27 | End: 2019-05-29 | Stop reason: HOSPADM

## 2019-05-27 RX ORDER — METOPROLOL SUCCINATE 50 MG/1
50 TABLET, EXTENDED RELEASE ORAL DAILY
Status: DISCONTINUED | OUTPATIENT
Start: 2019-05-28 | End: 2019-05-29 | Stop reason: HOSPADM

## 2019-05-27 RX ADMIN — ACETAMINOPHEN 650 MG: 325 TABLET ORAL at 09:05

## 2019-05-27 RX ADMIN — MIDODRINE HYDROCHLORIDE 5 MG: 5 TABLET ORAL at 09:05

## 2019-05-27 RX ADMIN — INSULIN ASPART 1 UNITS: 100 INJECTION, SOLUTION INTRAVENOUS; SUBCUTANEOUS at 09:05

## 2019-05-27 RX ADMIN — MIDODRINE HYDROCHLORIDE 5 MG: 5 TABLET ORAL at 04:05

## 2019-05-27 RX ADMIN — ATORVASTATIN CALCIUM 40 MG: 20 TABLET, FILM COATED ORAL at 09:05

## 2019-05-27 RX ADMIN — PANCRELIPASE 2 CAPSULE: 30000; 6000; 19000 CAPSULE, DELAYED RELEASE PELLETS ORAL at 09:05

## 2019-05-27 RX ADMIN — CLOPIDOGREL 75 MG: 75 TABLET, FILM COATED ORAL at 09:05

## 2019-05-27 RX ADMIN — VITAMIN D, TAB 1000IU (100/BT) 1000 UNITS: 25 TAB at 09:05

## 2019-05-27 RX ADMIN — METOPROLOL SUCCINATE 25 MG: 25 TABLET, EXTENDED RELEASE ORAL at 09:05

## 2019-05-27 RX ADMIN — APIXABAN 5 MG: 5 TABLET, FILM COATED ORAL at 09:05

## 2019-05-27 RX ADMIN — TORSEMIDE 20 MG: 20 TABLET ORAL at 09:05

## 2019-05-27 RX ADMIN — PANCRELIPASE 2 CAPSULE: 30000; 6000; 19000 CAPSULE, DELAYED RELEASE PELLETS ORAL at 11:05

## 2019-05-27 RX ADMIN — FUROSEMIDE 60 MG: 10 INJECTION, SOLUTION INTRAMUSCULAR; INTRAVENOUS at 05:05

## 2019-05-27 RX ADMIN — INSULIN ASPART 3 UNITS: 100 INJECTION, SOLUTION INTRAVENOUS; SUBCUTANEOUS at 11:05

## 2019-05-27 RX ADMIN — PANTOPRAZOLE SODIUM 40 MG: 40 TABLET, DELAYED RELEASE ORAL at 09:05

## 2019-05-27 RX ADMIN — TAMSULOSIN HYDROCHLORIDE 0.4 MG: 0.4 CAPSULE ORAL at 09:05

## 2019-05-27 RX ADMIN — PANCRELIPASE 2 CAPSULE: 30000; 6000; 19000 CAPSULE, DELAYED RELEASE PELLETS ORAL at 04:05

## 2019-05-27 RX ADMIN — INSULIN ASPART 2 UNITS: 100 INJECTION, SOLUTION INTRAVENOUS; SUBCUTANEOUS at 04:05

## 2019-05-27 RX ADMIN — ONDANSETRON 8 MG: 8 TABLET, ORALLY DISINTEGRATING ORAL at 05:05

## 2019-05-27 NOTE — PROGRESS NOTES
Hospital Medicine  Progress note    Team: [facility] R Mary Larose MD  Admit Date: 5/22/2019  [place] 5/28/2019  Length of Stay:  LOS: 5 days   Code status: Full Code    Principal Problem:  Symptomatic anemia    Overview:    Interval hx: Lisseth seen and examined at bedside, NAEON. Feels like the fluid is his legs has improved tremendously and he is happy about this. Still a lot of social stressors at home. He is unsure about whether he would be able to take care of self at home but it reticent about a rehabilitation facility. Upon reevaluation by PT, however, patient was able to transfer unassisted, walk to/from bathroom, etc. PT/OT HH was recommended.     ROS   Constitutional: Negative for chills, fatigue, fever.   HENT: Negative for sore throat, trouble swallowing.    Eyes: Negative for photophobia, visual disturbance.   Respiratory: Negative for cough, +ve shortness of breath on exertion.    Cardiovascular: Negative for chest pain, palpitations, +ve leg swelling (chronic lymphedema).   Gastrointestinal: Negative for abdominal pain, constipation, diarrhea, nausea, vomiting.   Endocrine: Negative for cold intolerance, heat intolerance.   Genitourinary: Negative for dysuria, frequency.   Musculoskeletal: Negative for arthralgias, myalgias.   Skin: Negative for rash, wound, erythema. +ve easy bruising.   Neurological: Negative for dizziness, syncope, +ve weakness, postural light-headedness.   Psychiatric/Behavioral: Negative for confusion, hallucinations, anxiety  All other systems reviewed and are negative.    PEx  Temp:  [97 °F (36.1 °C)-98.9 °F (37.2 °C)]   Pulse:  []   Resp:  [16-20]   BP: ()/(54-85)   SpO2:  [96 %-100 %]     Intake/Output Summary (Last 24 hours) at 5/27/2019 1549  Last data filed at 5/27/2019 1200  Gross per 24 hour   Intake 360 ml   Output 620 ml   Net -260 ml     Constitutional: Appears well-developed and well-nourished. Obese.  Head: Normocephalic and atraumatic.    Mouth/Throat: Oropharynx is clear and moist.   Eyes: EOM are normal. Pupils are equal, round, and reactive to light. No scleral icterus.   Neck: Normal range of motion. Neck supple.   Cardiovascular: Normal rate and regular rhythm.  No murmur heard. R port in place.  Pulmonary/Chest: Effort normal and breath sounds normal. No respiratory distress. No wheezes, rales, or rhonchi  Abdominal: Soft. Bowel sounds are normal.  No distension or tenderness  Musculoskeletal: Normal range of motion. +ve edema. B/L LE wrapped.   Neurological: Alert and oriented to person, place, and time.   Skin: Skin is warm and dry. Multiple ecchymosis b/l UE.  Psychiatric: Normal mood and affect. Behavior is normal.     Recent Labs   Lab 05/24/19  0530 05/25/19  0932 05/26/19  0119   WBC 6.30 5.16 5.66   HGB 8.6* 8.0* 8.0*   HCT 26.1* 25.4* 24.3*    195 185     Recent Labs   Lab 05/22/19  1531 05/23/19 0448 05/24/19 0530 05/25/19  0932 05/26/19  0907 05/27/19  1031   * 134* 135* 133* 134* 135*   K 3.6 3.2* 3.2* 3.5 3.5 3.3*    107 107 106 105 105   CO2 19* 22* 23 21* 22* 23   BUN 50* 46* 43* 42* 41* 41*   CREATININE 2.3* 2.0* 1.9* 2.3* 2.3* 2.6*   * 67* 120* 300* 257* 246*   CALCIUM 6.8* 7.1* 6.9* 6.8* 6.9* 7.0*   MG 1.7 1.6 1.5* 1.3*  --  1.5*   PHOS 4.6* 4.3 3.8 3.1  --   --    LIPASE <3*  --   --   --   --   --      Recent Labs   Lab 05/22/19  1531 05/23/19 0448 05/24/19  0530 05/25/19  0932   ALKPHOS 117 116 121 106   ALT 19 21 25 25   AST 20 22 33 29   ALBUMIN 1.7* 1.7* 1.4* 1.6*   PROT 3.9* 4.0* 3.5* 3.4*   BILITOT 0.3 0.4 0.4 0.4   INR 1.1 1.1 1.2  --         No results for input(s): CPK, CPKMB, MB, TROPONINI in the last 72 hours.  Recent Labs   Lab 05/25/19 2039 05/26/19  0858 05/26/19  1125 05/26/19  1729 05/26/19 2054 05/27/19  0826   POCTGLUCOSE 235* 184* 237* 210* 234* 160*     Hemoglobin A1C   Date Value Ref Range Status   05/23/2019 7.1 (H) 4.0 - 5.6 % Final     Comment:     ADA Screening  Guidelines:  5.7-6.4%  Consistent with prediabetes  >or=6.5%  Consistent with diabetes  High levels of fetal hemoglobin interfere with the HbA1C  assay. Heterozygous hemoglobin variants (HbS, HgC, etc)do  not significantly interfere with this assay.   However, presence of multiple variants may affect accuracy.     03/18/2019 7.4 (H) 4.0 - 5.6 % Final     Comment:     ADA Screening Guidelines:  5.7-6.4%  Consistent with prediabetes  >or=6.5%  Consistent with diabetes  High levels of fetal hemoglobin interfere with the HbA1C  assay. Heterozygous hemoglobin variants (HbS, HgC, etc)do  not significantly interfere with this assay.   However, presence of multiple variants may affect accuracy.     01/28/2019 6.9 (H) 4.0 - 5.6 % Final     Comment:     ADA Screening Guidelines:  5.7-6.4%  Consistent with prediabetes  >or=6.5%  Consistent with diabetes  High levels of fetal hemoglobin interfere with the HbA1C  assay. Heterozygous hemoglobin variants (HbS, HgC, etc)do  not significantly interfere with this assay.   However, presence of multiple variants may affect accuracy.         Scheduled Meds:   apixaban  5 mg Oral BID    atorvastatin  40 mg Oral Daily    clopidogrel  75 mg Oral Daily    furosemide  60 mg Intravenous BID    lipase-protease-amylase 6,000-19,000-30,000 units  2 capsule Oral TID WM    [START ON 5/28/2019] metoprolol succinate  50 mg Oral Daily    midodrine  5 mg Oral TID    pantoprazole  40 mg Oral Daily    tamsulosin  1 capsule Oral Daily    vitamin D  1,000 Units Oral Daily     Continuous Infusions:  As Needed:  sodium chloride, acetaminophen, dextrose 50%, dextrose 50%, glucagon (human recombinant), glucose, glucose, insulin aspart U-100, ondansetron, sodium chloride 0.9%, sodium chloride 0.9%    Active Hospital Problems    Diagnosis  POA    *Symptomatic anemia [D64.9]  Yes    Alteration in skin integrity [R23.9]  Yes    Venous stasis dermatitis of both lower extremities [I87.2]  Yes    Stage  3 chronic kidney disease [N18.3]  Yes    Acute renal failure with specified lesion [N17.8]  Yes    Hypertension associated with diabetes [E11.59, I10]  Yes    Paroxysmal atrial fibrillation [I48.0]  Yes    Ascites [R18.8]  Yes    DM type 2 with diabetic peripheral neuropathy [E11.42]  Yes    Hypoalbuminemia [E88.09]  Yes    Acute on chronic combined systolic and diastolic heart failure [I50.43]  Yes     1-28-19  Left Ventricle Moderate decreased ejection fraction at 30%. Normal left ventricular cavity size. Normal wall thickness observed. Grade I (mild) left ventricular diastolic dysfunction consistent with impaired relaxation. Normal left atrial pressure. Moderate, global hypokinesis(see wall scoring diagram).   Right Ventricle Normal cavity size, wall thickness and ejection fraction. Wall motion normal.   Left Atrium Normal atrial size. .   Right Atrium Normal atrial size.   Aortic Valve The valve is trileaflet. Aortic valve sclerosis is mild. No stenosis. No regurgitation. Mobility is normal   Mitral Valve Normal valve structure. No stenosis. No regurgitation. Normal leaflet mobility.   Tricuspid Valve Tricuspid valve not well visualized. Trace regurgitation.   Pulmonic Valve The pulmonic valve was not well visualized.   IVC/SVC Inferior vena cava is not well visualized.   Ascending Aorta The aortic root and ascending aorta are normal in size.   Pericardium No pericardial effusion.           Anasarca [R60.1]  Yes    Chronic pancreatitis [K86.1]  Yes    Morbid obesity with BMI of 50.0-59.9, adult [E66.01, Z68.43]  Not Applicable      Resolved Hospital Problems   No resolved problems to display.         Assessment and Plan    # Symptomatic Anemia  - no evidence of GI bleed. Previous iron panel in march indicative of AoCD.   - transfused 1 unit of PRBC  - Hgb stable   - Nutrition consult placed.      # Acute on chronic systolic and diastolic heart failure  - TTE ordered, showed normal EF and diastolic  function  - lasix 60 mg IV transitioned to Oral Torsemide BID as per nephrology   - daily weights  - strict I/Os     # Ascites  - The etiology of this patient's significant ascites remains elusive. He has seen both hepatology and cardiology. A liver biopsy was -ve for clinically significant cirrhosis and portal hypertension. A 2D echo during this evaluation did not show any significant abnormalities, though it should be noted that previous studies have identified both diastolic and systolic dysfunction. He has a history of CAD with multiple stents placed in the past which make CM likely. He is on a beta blocker and diuretic, but his BP is making it difficult to adequately titrate these to maximal effect.   - h/o multiple paracenteses, most recently 5/24 (-2L)  - other potential etiology includes chronic pancreatitis (leak?) vs peritoneal carcinomatosis though fluid analysis does not really reflect this. Previous amylase x2 wnl. Cytology did not show malignant cells.   - will need recurrent IR labs on a q weekly or q bimonthly basis.   - have placed nutrition consult due to hypoalbuminemia, h/o gastric bypass, and presumably poor absorption in the setting of chronic pancreatitis.  - will need serial IR paracenteses ordered upon discharge. Patient requesting Mon/Fri. Would include repeat amylase and cytology on next para.      # Essential HTN   - hypotensive throughout hospitalization. Metoprolol titrated down; continue at 50 mg. Discontinuing torsemide, and restarting lasix, per nephrology's recommendations.      # DM2 with HTN and neuropathy and CKD stage 3  - Hba1c 7.1  - SSI; on home insulin      # Paroxysmal atrial fibrillation  - cont toprol and eliquis  - toprol titrated down due to persistent hypotension, weakness and dizziness.     # Morbid Obesity  Body mass index is 44.95 kg/m².  Nutrition consult placed. Weight loss recommended.     #Hypoalbuminemia   - UA negative for protein  - Liver biopsy negative for  cirrhosis   - might benefit from GI referral for EGD/Colonoscopy  - nutrition consult    # LE Venous stasis ulcers  - wound care consult hydrofera blue placed to left leg     # ELIEZER on CKD stage 3  - urine lytes and nephrology consult as per patient request; renal U/S showed no hydronephrosis   - nephrology consulted. Appreciate assistance in managing complex patient's volume status and CKD, now with Cr bump to 2.6     # Chronic pancreatitis  - cont creon  - past h/o alcohol abuse  - could explain recurrent ascites in the absense of cirrhosis     High Risk Conditions  DM, BERHANE, HTN, Extreme Obesity      Diet: diabetic diet   GI PPx:   DVT PPx:    Anticoagulants   Medication Route Frequency    apixaban tablet 5 mg Oral BID       Goals of Care: Full code   Discharge plan: pending IR paracentesis and Pt./OT recs     Time (minutes) spent in care of the patient (Greater than 1/2 spent in direct face-to-face contact) 45 minutes     aMry Larose MD  [id number]

## 2019-05-27 NOTE — SUBJECTIVE & OBJECTIVE
Interval history: Cr continues to worsen 2.3 to 2.6 - UOP appears to have dropped off on Torsemide. Will trial Lasix 60mg IV BID and monitor UOP and Cr. Patient feels symptomatically better with wound care's treatment of legs and with overall hospital care. Awaiting decision regarding dispo - patient anxious for answers regarding etiology of swelling and debility.     Review of patient's allergies indicates:  No Known Allergies  Current Facility-Administered Medications   Medication Frequency    0.9%  NaCl infusion (for blood administration) Q24H PRN    acetaminophen tablet 650 mg Q4H PRN    apixaban tablet 5 mg BID    atorvastatin tablet 40 mg Daily    clopidogrel tablet 75 mg Daily    dextrose 50% injection 12.5 g PRN    dextrose 50% injection 25 g PRN    glucagon (human recombinant) injection 1 mg PRN    glucose chewable tablet 16 g PRN    glucose chewable tablet 24 g PRN    insulin aspart U-100 pen 0-5 Units QID (AC + HS) PRN    lipase-protease-amylase 6,000-19,000-30,000 units per capsule 2 capsule TID WM    metoprolol succinate (TOPROL-XL) 24 hr tablet 25 mg Daily    midodrine tablet 5 mg TID    ondansetron disintegrating tablet 8 mg Q8H PRN    pantoprazole EC tablet 40 mg Daily    sodium chloride 0.9% flush 10 mL PRN    sodium chloride 0.9% flush 10 mL PRN    tamsulosin 24 hr capsule 0.4 mg Daily    torsemide tablet 20 mg BID    vitamin D 1000 units tablet 1,000 Units Daily     Review of Systems   Constitutional: Positive for fatigue. Negative for chills and fever.   HENT: Negative.  Negative for postnasal drip and sore throat.    Eyes: Negative.    Respiratory: Positive for shortness of breath. Negative for chest tightness.    Cardiovascular: Positive for palpitations and leg swelling. Negative for chest pain.   Gastrointestinal: Positive for abdominal distention. Negative for abdominal pain, blood in stool, constipation and nausea.   Endocrine: Negative.    Genitourinary: Negative for  dysuria.   Musculoskeletal: Negative for arthralgias, back pain and joint swelling.   Skin: Negative.    Allergic/Immunologic: Negative.    Neurological: Negative.  Negative for light-headedness and headaches.   Hematological: Negative.    Psychiatric/Behavioral: Negative for confusion and dysphoric mood. The patient is not nervous/anxious and is not hyperactive.      Objective:     Vital Signs (Most Recent):  Temp: 98.2 °F (36.8 °C) (05/27/19 1217)  Pulse: 90 (05/27/19 1217)  Resp: 18 (05/27/19 1217)  BP: 120/85 (05/27/19 1217)  SpO2: 100 % (05/27/19 1217)  O2 Device (Oxygen Therapy): room air (05/27/19 0910) Vital Signs (24h Range):  Temp:  [96.6 °F (35.9 °C)-98.9 °F (37.2 °C)] 98.2 °F (36.8 °C)  Pulse:  [] 90  Resp:  [16-20] 18  SpO2:  [96 %-100 %] 100 %  BP: ()/(52-85) 120/85     Weight: 121 kg (266 lb 12.1 oz) (05/27/19 0313)  Body mass index is 41.78 kg/m².  Body surface area is 2.39 meters squared.    I/O last 3 completed shifts:  In: -   Out: 1820 [Urine:1820]    Physical Exam   Constitutional: He appears well-developed.  Non-toxic appearance. He appears ill. No distress.   Obese male  Lying flat in bed on room air - able to speak in full sentences  Bruises about BL UE, wrapped LE BL with swelling   PICC line R chest wall   HENT:   Head: Normocephalic and atraumatic.   Mouth/Throat: Abnormal dentition.   Eyes: Pupils are equal, round, and reactive to light.   Cardiovascular: Normal rate, S1 normal, S2 normal and intact distal pulses.   Murmur heard.  Pulses:       Radial pulses are 2+ on the right side, and 2+ on the left side.        Dorsalis pedis pulses are 2+ on the right side, and 2+ on the left side.   Pulmonary/Chest: Effort normal.   Abdominal: Soft. He exhibits no distension. There is no tenderness. There is no guarding.   Musculoskeletal: He exhibits edema (improving with proper wrapping, diuretics).   Lymphadenopathy:   Significant lymphedema BL LE   Neurological: He is alert. GCS eye  subscore is 4. GCS verbal subscore is 5. GCS motor subscore is 6.   Skin: Skin is warm. There is pallor.   Nursing note and vitals reviewed.      Significant Labs:  All labs within the past 24 hours have been reviewed.    Significant Imaging:  Labs: Reviewed  ECG: Reviewed  Echo: Reviewed

## 2019-05-27 NOTE — PT/OT/SLP PROGRESS
"Physical Therapy Treatment and Discharge     Patient Name:  Jian Arrieta   MRN:  [id number]    Recommendations:     Discharge Recommendations:  home health PT   Discharge Equipment Recommendations: none   Barriers to discharge: Decreased caregiver support    Assessment:     Jian Arrieta is a 63 y.o. male admitted with a medical diagnosis of Symptomatic anemia.  He presents with the following impairments/functional limitations:  impaired endurance. Pt has met all functional mobility goals set at this time and is safe to d/c home with HHPT. Pt voicing primary concern is reliable transportation for x2 days a week paracentesis. Pt performing all bed mobility Ind. And OOB activity Mod I/Ind. Pt reports distance amb and activity performed on this date is equivalent to baseline mobility at home. Treating therapist requesting pt amb with RW initially, pt initiated gait training carrying RW off the floor stating "see I don't need it".     Rehab Prognosis: Fair; patient would benefit from acute skilled PT services to address these deficits and reach maximum level of function.    Recent Surgery: * No surgery found *      Plan:     During this hospitalization, patient to be seen 3 x/week to address the identified rehab impairments via gait training, therapeutic activities, therapeutic exercises, neuromuscular re-education and progress toward the following goals:    · Plan of Care Expires:  06/24/19    Subjective     Chief Complaint: Needs consistent fluid paracentesis   Patient/Family Comments/goals: "that bathroom is as far as I have to walk to mine" "I have good days and bad days, this is a good day"  Pain/Comfort:  · Pain Rating 1: 0/10      Objective:     Communicated with RN prior to session.  Patient found supine with telemetry, PICC line upon PT entry to room.     General Precautions: Standard, fall   Orthopedic Precautions:N/A   Braces: N/A     Functional Mobility:  · Bed Mobility:     · Rolling Left:  " independence  · Scooting: independence  · Bridging: independence  · Supine to Sit: independence  · Sit to Supine: independence  · Transfers:     · Sit to Stand:  independence with no AD  · Bed to Chair: independence with  no AD  using  Step Transfer  · Toilet Transfer: independence with  grab bars  using  Step Transfer  · Gait: 8ft, 18ft, 12ft Ind. holding RW off the ground   · Balance: Ind.       AM-PAC 6 CLICK MOBILITY  Turning over in bed (including adjusting bedclothes, sheets and blankets)?: 4  Sitting down on and standing up from a chair with arms (e.g., wheelchair, bedside commode, etc.): 4  Moving from lying on back to sitting on the side of the bed?: 4  Moving to and from a bed to a chair (including a wheelchair)?: 4  Need to walk in hospital room?: 4  Climbing 3-5 steps with a railing?: 4  Basic Mobility Total Score: 24       Therapeutic Activities and Exercises:  - Supine TherEx with R T-band consisting of hip flex/ext. And knee flex/ext.  x10   -Pt educated on:   -PT roles, expectations, and POC    -Safety with mobility   -Benefits of OOB activities to increase strength and functional mobility    -Performing ther ex for increasing LE ROM and strength   -Discharge recommendations     Patient left supine with call button in reach and OT present..    GOALS:   Multidisciplinary Problems     Physical Therapy Goals     Not on file          Multidisciplinary Problems (Resolved)        Problem: Physical Therapy Goal    Goal Priority Disciplines Outcome Goal Variances Interventions   Physical Therapy Goal   (Resolved)     PT, PT/OT Outcome(s) achieved     Description:  Goals to be met by:     Patient will increase functional independence with mobility by performin. Supine to sit with Stand-by Assistance  2. Sit to supine with Stand-by Assistance  3. Sit to stand transfer with Stand-by Assistance  4. Gait  x 25 feet with Stand-by Assistance using LRAD  5. Lower extremity exercise program x15 reps  per handout, with independence                     Time Tracking:     PT Received On: 05/27/19  PT Start Time: 1400     PT Stop Time: 1424  PT Total Time (min): 24 min     Billable Minutes: Gait Training 11 and Therapeutic Activity 13    Treatment Type: Treatment  PT/PTA: PT           Selvin Daley, PT  05/27/2019

## 2019-05-27 NOTE — PLAN OF CARE
Problem: Adult Inpatient Plan of Care  Goal: Plan of Care Review  Outcome: Ongoing (interventions implemented as appropriate)  No acute events during this shift at this time.    Patient displayed improved ambulation and ability to perform ADLs. Decreased edema observed in BLEs. Occasional N/V associated with body positioning- patient likes to sleep

## 2019-05-27 NOTE — PLAN OF CARE
05/27/19 1406   Discharge Reassessment   Assessment Type Discharge Planning Reassessment   Provided patient/caregiver education on the expected discharge date and the discharge plan Yes   Do you have any problems affording any of your prescribed medications? No   Discharge Plan A Home Health   Discharge Plan B Home with family   DME Needed Upon Discharge  none   Patient choice form signed by patient/caregiver Yes   Anticipated Discharge Disposition Home-Health   Can the patient answer the patient profile reliably? Yes, cognitively intact   How does the patient rate their overall health at the present time? Fair   How often would a person be available to care for the patient? Whenever needed   Number of comorbid conditions (as recorded on the chart) Five or more   During the past month, has the patient often been bothered by feeling down, depressed or hopeless? No   During the past month, has the patient often been bothered by little interest or pleasure in doing things? No   Post-Acute Status   Post-Acute Authorization Home Health/Hospice   Home Health/Hospice Status Awaiting Internal Medical Clearance

## 2019-05-27 NOTE — PLAN OF CARE
Problem: Physical Therapy Goal  Goal: Physical Therapy Goal  Goals to be met by:     Patient will increase functional independence with mobility by performin. Supine to sit with Stand-by Assistance  2. Sit to supine with Stand-by Assistance  3. Sit to stand transfer with Stand-by Assistance  4. Gait  x 25 feet with Stand-by Assistance using LRAD  5. Lower extremity exercise program x15 reps per handout, with independence    Outcome: Outcome(s) achieved Date Met: 19  All goals met. Pt expected to d/c home with HHPT for further in home evaluation. Please re-consult with change in medical status.     Severiano Daley, PT, DPT  2019

## 2019-05-27 NOTE — PLAN OF CARE
Problem: Occupational Therapy Goal  Goal: Occupational Therapy Goal  Goals to be met by: 6/5/19     Patient will increase functional independence with ADLs by performing:    UE Dressing with Contact Guard Assistance. - met  LE Dressing with Maximum Assistance using AE as needed. - met  Grooming while standing with Minimal Assistance. - met  Toileting from bedside commode with Moderate Assistance for hygiene and clothing management. - met  Rolling to Bilateral with Stand-by Assistance. - met  Supine to sit with Set-up Assistance. - met  Step transfer with Stand-by Assistance - met  Toilet transfer to bedside commode with Stand-by Assistance. - met     Outcome: Outcome(s) achieved Date Met: 05/27/19  Goals met.  Discharge from OT on acute.

## 2019-05-27 NOTE — PROGRESS NOTES
[facility]  Nephrology  Progress Note    Patient Name: Jian Arrieta  MRN: [id number]  Admission Date: 5/22/2019  Hospital Length of Stay: 5 days  Attending Provider: Mary Larose*   Primary Care Physician: Leon Barajas DO  Principal Problem:Symptomatic anemia    Subjective:     HPI: Mr. Jian Arrieta is a 63 year old male with CAD (s/p PCI with 5 stents) with subsequent CABG x 3v (2017 at ), chronic combined systolic and diastolic heart failure, HLD, DM2 on insulin, BERHANE on CPAP, chronic pancreatitis with pseudocyst being admitted to observation for shortness of breath. Patient had a paracentesis and lower extremity US 5/23. He endorses SOB and intermittent b/l leg swelling x several months. He denies fever, chest pain, abdominal pain, vomiting, melena, or confusion. He is on eliquis.Patient states he feels like he has gained over 25 pounds of fluid.  Feels frustrated that he has not seen a nephrologist during last admission.  Had a paracentesis 5/23 with 3.6L removed.   Nephrology was consulted out of concern for ELIEZER on CKDIII and volume overload.     Of note, patient was hospitalized in March 2019 at which point he was evaluated for cirrhosis. US liver displayed cirrhosis and concern for portal hypertension. He underwent 3 large volume paracentesis during this hospitalization and underwent liver biopsy with transjugular pressure gradient was 2mm hg and biopsy was not consistent with cirrhosis. Additionally, ascitic fluid studies have been consistent with high SAAG and high protein more in line with heart failure.     Interval history: Cr continues to worsen 2.3 to 2.6 - UOP appears to have dropped off on Torsemide. Will trial Lasix 60mg IV BID and monitor UOP and Cr. Patient feels symptomatically better with wound care's treatment of legs and with overall hospital care. Awaiting decision regarding dispo - patient anxious for answers regarding etiology of swelling and  debility.     Review of patient's allergies indicates:  No Known Allergies  Current Facility-Administered Medications   Medication Frequency    0.9%  NaCl infusion (for blood administration) Q24H PRN    acetaminophen tablet 650 mg Q4H PRN    apixaban tablet 5 mg BID    atorvastatin tablet 40 mg Daily    clopidogrel tablet 75 mg Daily    dextrose 50% injection 12.5 g PRN    dextrose 50% injection 25 g PRN    glucagon (human recombinant) injection 1 mg PRN    glucose chewable tablet 16 g PRN    glucose chewable tablet 24 g PRN    insulin aspart U-100 pen 0-5 Units QID (AC + HS) PRN    lipase-protease-amylase 6,000-19,000-30,000 units per capsule 2 capsule TID WM    metoprolol succinate (TOPROL-XL) 24 hr tablet 25 mg Daily    midodrine tablet 5 mg TID    ondansetron disintegrating tablet 8 mg Q8H PRN    pantoprazole EC tablet 40 mg Daily    sodium chloride 0.9% flush 10 mL PRN    sodium chloride 0.9% flush 10 mL PRN    tamsulosin 24 hr capsule 0.4 mg Daily    torsemide tablet 20 mg BID    vitamin D 1000 units tablet 1,000 Units Daily     Review of Systems   Constitutional: Positive for fatigue. Negative for chills and fever.   HENT: Negative.  Negative for postnasal drip and sore throat.    Eyes: Negative.    Respiratory: Positive for shortness of breath. Negative for chest tightness.    Cardiovascular: Positive for palpitations and leg swelling. Negative for chest pain.   Gastrointestinal: Positive for abdominal distention. Negative for abdominal pain, blood in stool, constipation and nausea.   Endocrine: Negative.    Genitourinary: Negative for dysuria.   Musculoskeletal: Negative for arthralgias, back pain and joint swelling.   Skin: Negative.    Allergic/Immunologic: Negative.    Neurological: Negative.  Negative for light-headedness and headaches.   Hematological: Negative.    Psychiatric/Behavioral: Negative for confusion and dysphoric mood. The patient is not nervous/anxious and is not  hyperactive.      Objective:     Vital Signs (Most Recent):  Temp: 98.2 °F (36.8 °C) (05/27/19 1217)  Pulse: 90 (05/27/19 1217)  Resp: 18 (05/27/19 1217)  BP: 120/85 (05/27/19 1217)  SpO2: 100 % (05/27/19 1217)  O2 Device (Oxygen Therapy): room air (05/27/19 0910) Vital Signs (24h Range):  Temp:  [96.6 °F (35.9 °C)-98.9 °F (37.2 °C)] 98.2 °F (36.8 °C)  Pulse:  [] 90  Resp:  [16-20] 18  SpO2:  [96 %-100 %] 100 %  BP: ()/(52-85) 120/85     Weight: 121 kg (266 lb 12.1 oz) (05/27/19 0313)  Body mass index is 41.78 kg/m².  Body surface area is 2.39 meters squared.    I/O last 3 completed shifts:  In: -   Out: 1820 [Urine:1820]    Physical Exam   Constitutional: He appears well-developed.  Non-toxic appearance. He appears ill. No distress.   Obese male  Lying flat in bed on room air - able to speak in full sentences  Bruises about BL UE, wrapped LE BL with swelling   PICC line R chest wall   HENT:   Head: Normocephalic and atraumatic.   Mouth/Throat: Abnormal dentition.   Eyes: Pupils are equal, round, and reactive to light.   Cardiovascular: Normal rate, S1 normal, S2 normal and intact distal pulses.   Murmur heard.  Pulses:       Radial pulses are 2+ on the right side, and 2+ on the left side.        Dorsalis pedis pulses are 2+ on the right side, and 2+ on the left side.   Pulmonary/Chest: Effort normal.   Abdominal: Soft. He exhibits no distension. There is no tenderness. There is no guarding.   Musculoskeletal: He exhibits edema (improving with proper wrapping, diuretics).   Lymphadenopathy:   Significant lymphedema BL LE   Neurological: He is alert. GCS eye subscore is 4. GCS verbal subscore is 5. GCS motor subscore is 6.   Skin: Skin is warm. There is pallor.   Nursing note and vitals reviewed.      Significant Labs:  All labs within the past 24 hours have been reviewed.    Significant Imaging:  Labs: Reviewed  ECG: Reviewed  Echo: Reviewed    Assessment/Plan:     Stage 3 chronic kidney disease  Patient  with abnormal Cr dating back to 3/12 with acute worsening of renal function on 3/27. Of note, patient was discharge on 3/21 the day after he received 125ml of contrast load in his angiogram. The next metabolic panel on 3/27 displayed his worsening renal function. Patient has multiple risk factors for CKD with HTN, CAD/CHF, Obesity, DM and risk factors for acute worsening with fluid shifts related to repeat paracenteses in the setting of likely prior JENNY. Patient presented on 5/22 with Cr around his new baseline at Cr 2.3 and improved overnight with administration of lasix 60mg IV once before next lab draw and pRBC.     Patient lying flat, speaking in full sentences, has significant peripheral lymphedema (chronic) but does not appear to be grossly overloaded with pulmonary edema on CXR and no rales on examination    Plan:  Suggest modification of diuretic to Lasix 60mg IV BID  Daily CMP, Mag, Phos  Strict I's and O's  Daily weights  Avoid NSAIDS and other nephrotoxins  Renally dose all medications  US RP displayed decreased perfusion bilaterally.  Otherwise the kidneys appear unremarkable without evidence for hydronephrosis. Ascites.    Hypoalbuminemia  · Patient with chronic pancreatitis on enzyme replacement  · Has persistent and worsening hypoalbuminemia   · UA without any protein loss  · Suggest patient be re-evaluated by GI specialist regarding enzyme replacement   · Modification to torsemide for diuretic now and upon discharge as it is longer acting and has better activity in the setting of severe hypoalbuminemia     Acute on chronic combined systolic and diastolic heart failure            Thank you for your consult. I will follow-up with patient. Please contact us if you have any additional questions.    Leon Boston MD  Nephrology  [facility]

## 2019-05-27 NOTE — PLAN OF CARE
Problem: Adult Inpatient Plan of Care  Goal: Plan of Care Review  Outcome: Ongoing (interventions implemented as appropriate)     05/27/19 0529 05/27/19 6980   Plan of Care Review   Plan of Care Reviewed With  --  patient   Progress improving  --      Pt aaox4 resting. V/s stable/ pt remained free of falls and injury this shift. Pt complained of nausea prn med adm. Wound care performed. Will continue to monitor and interventions as appropriate.

## 2019-05-27 NOTE — PROGRESS NOTES
Cardiology Clinic Note  Reason for Visit: cardiomyopathy    HPI:     Jian Arrieta is a 63 y.o. M with CAD s/p PCI, HLD, DM2, cirrhosis, BERHANE, who presents for follow up.    Since our last visit, he had a tunneled PICC placement for paracentesis. He was admitted to [facility] 5/23-5/29 with 25lb weight gain. He reports stable lower extremity edema but the weight being present in his abdomen. He underwent several paracenteses with improvement in symptoms of shortness of breath and loss of the excess weight. While hospitalized, echo showed normal biventricular function. His baseline weight at home after discharge was 264lb. He was discharged on lasix 80mg BID for diuretic and he reports small volume of dark urine output in the <24 hours since discharge.    Further hepatology evaluation was negative for cirrhosis, although CT abd does have findings suggestive of cirrhosis. Etiology for recurrent ascites remains unclear.    Medical: CAD s/p PCI (x5) with subsequent CABG x3v (~2017 at ; LIMA-LAD [patent], SVG-OM [patent], SVG-RCA[occluded; L->R collaterals from LAD to PDA]), HLD, DM2, BERHANE on CPAP, R knee OA, Charcot foot, normocytic anemia, recurrent ascites (CT with findings of cirrhosis but biopsy negative; has large pancreatic pseudocyst)  Surgical: sleeve gastrectomy, knee, perianal cyst removal, CABG   Family: DM, Parkinson's, cancers, stroke  Social: former smoker of 2 PPD x30y (quit 2005)     ROS:    Constitution: Negative for fever or chills.  HENT: Negative for  headaches.  Eyes: Negative for blurred vision.   Cardiovascular: See above  Pulmonary: Positive for SOB. Negative for cough.   Gastrointestinal: Negative for nausea/vomiting.   : Negative for dysuria.   Skin: Negative for rashes.  Neurological: Negative for focal weakness.  Psychological: Negative for depression.  PMH:     Past Medical History:   Diagnosis Date    Alcohol abuse     Anasarca 1/28/2019    Arthropathy associated with neurological  disorder 9/2/2015    Atherosclerosis     Charcot foot due to diabetes mellitus     Chronic combined systolic and diastolic heart failure 01/29/2019 1-28-19 Left VentricleModerate decreased ejection fraction at 30%. Normal left ventricular cavity size. Normal wall thickness observed. Grade I (mild) left ventricular diastolic dysfunction consistent with impaired relaxation. Normal left atrial pressure. Moderate, global hypokinesis(see wall scoring diagram). Right VentricleNormal cavity size, wall thickness and ejection fraction. Wall motion n    Chronic pancreatitis 1/28/2019    Colon polyps     approx 5 yrs ago    Coronary artery disease due to calcified coronary lesion 05/08/2015    5 stents on ASA      Diabetic polyneuropathy associated with type 2 diabetes mellitus 9/2/2015    Diverticulosis 1/28/2019    DM type 2 with diabetic peripheral neuropathy 2/4/2019    Essential hypertension 1/28/2019    Former smoker 8/26/2015    Healed ulcer of left foot on examination 6/20/2017    Hydrocele     approx 1.5 yrs ago    Hypoalbuminemia 2/4/2019    Lymphedema of both lower extremities 1/29/2019    Mixed hyperlipidemia 5/8/2015    Morbid obesity with BMI of 50.0-59.9, adult 5/8/2015    Onychomycosis of multiple toenails with type 2 diabetes mellitus and peripheral neuropathy 6/20/2017    Perianal cyst     approx 2 yrs ago    Pseudocyst of pancreas 1/28/2019 1-28-19 Liver has a cirrhotic morphology with no focal lesions.  Significant interval increase in ascites when compared to prior exam which may account for patient's abdominal distension.  Hypodense air-fluid collection along the body of the pancreas which is slightly smaller when compared to prior CT.  Findings may relate to pancreatic necrosis with pancreatic pseudocysts with infected pseudocyst    Skin cancer     skin cancer    Sleep apnea 8/26/2015    Status post bariatric surgery 1/11/2016    Type 2 diabetes mellitus, with long-term  current use of insulin 5/8/2015     Past Surgical History:   Procedure Laterality Date    ANGIOGRAM, CORONARY ARTERY Right 3/20/2019    Performed by Bob Duque MD at [facility] CATH LAB    ANGIOPLASTY      total x5 stents    Bypass graft study  3/20/2019    Performed by Bob Duque MD at [facility] CATH LAB    COLONOSCOPY N/A 10/6/2015    Performed by Shekhar Richards MD at [facility] ENDO (2ND FLR)    CORONARY ARTERY BYPASS GRAFT  2017    x3    CYST REMOVAL      GASTRECTOMY      GASTRECTOMY-SLEEVE-LAPAROSCOPIC - 94386 N/A 12/22/2015    Performed by Micheal Villavicencio Jr., MD at [facility] OR 2ND FLR    KNEE ARTHROSCOPY      perianal surgery      perianal cyst removed    pleurx N/A 5/17/2019    Performed by [facility] Diagnostic Provider at [facility] OR 2ND FLR     Allergies:   Review of patient's allergies indicates:  No Known Allergies  Medications:     Current Facility-Administered Medications on File Prior to Visit   Medication Dose Route Frequency Provider Last Rate Last Dose    0.9%  NaCl infusion (for blood administration)   Intravenous Q24H PRN Nitin Spence MD        acetaminophen tablet 650 mg  650 mg Oral Q4H PRN Tobi Wooten MD   650 mg at 05/26/19 1930    apixaban tablet 5 mg  5 mg Oral BID Tobi Wooten MD   5 mg at 05/27/19 0907    atorvastatin tablet 40 mg  40 mg Oral Daily Tobi Wooten MD   40 mg at 05/27/19 0924    clopidogrel tablet 75 mg  75 mg Oral Daily Tobi Wooten MD   75 mg at 05/27/19 0907    dextrose 50% injection 12.5 g  12.5 g Intravenous PRN Tobi Wooten MD        dextrose 50% injection 25 g  25 g Intravenous PRN Tobi Wooten MD        glucagon (human recombinant) injection 1 mg  1 mg Intramuscular PRN Tobi Wooten MD        glucose chewable tablet 16 g  16 g Oral PRN Tobi Wooten MD        glucose chewable tablet 24 g  24 g Oral PRN Tobi Wooten MD        insulin aspart U-100 pen 0-5 Units  0-5 Units Subcutaneous QID (AC + HS) PRN Tobi Wooten MD   3  Units at 05/27/19 1155    lipase-protease-amylase 6,000-19,000-30,000 units per capsule 2 capsule  2 capsule Oral TID  Mary Larose MD   2 capsule at 05/27/19 1150    metoprolol succinate (TOPROL-XL) 24 hr tablet 25 mg  25 mg Oral Daily Mary Larose MD   25 mg at 05/27/19 0907    midodrine tablet 5 mg  5 mg Oral TID Mary Larose MD   5 mg at 05/27/19 0907    ondansetron disintegrating tablet 8 mg  8 mg Oral Q8H PRN Tobi Wooten MD   8 mg at 05/26/19 1931    pantoprazole EC tablet 40 mg  40 mg Oral Daily Tobi Wooten MD   40 mg at 05/27/19 0907    sodium chloride 0.9% flush 10 mL  10 mL Intravenous PRN Nitin Spence MD        sodium chloride 0.9% flush 10 mL  10 mL Intravenous PRN Tobi Wooten MD        tamsulosin 24 hr capsule 0.4 mg  1 capsule Oral Daily Tobi Wooten MD   0.4 mg at 05/27/19 0907    torsemide tablet 20 mg  20 mg Oral BID Roly Caba MD   20 mg at 05/27/19 0907    vitamin D 1000 units tablet 1,000 Units  1,000 Units Oral Daily Tobi Wooten MD   1,000 Units at 05/27/19 0908     Current Outpatient Medications on File Prior to Visit   Medication Sig Dispense Refill    apixaban (ELIQUIS) 5 mg Tab Take 1 tablet (5 mg total) by mouth 2 (two) times daily. 90 tablet 3    atorvastatin (LIPITOR) 40 MG tablet Take 1 tablet (40 mg total) by mouth once daily. 90 tablet 3    clopidogrel (PLAVIX) 75 mg tablet Take 1 tablet (75 mg total) by mouth once daily. 30 tablet 11    cyanocobalamin, vitamin B-12, 500 mcg Subl Place 1 tablet under the tongue every Monday.       furosemide (LASIX) 40 MG tablet Take 1 tablet (40 mg total) by mouth once daily. 90 tablet 3    insulin aspart U-100 (NOVOLOG) 100 unit/mL injection Inject 4 Units into the skin 3 (three) times daily before meals.      insulin detemir (LEVEMIR FLEXPEN SUBQ) Inject 12 Units into the skin once daily.       lipase-protease-amylase (CREON) 36,000-114,000- 180,000 unit CpDR  "Take 1 capsule by mouth 3 (three) times daily. 270 capsule 3    metoprolol succinate (TOPROL-XL) 100 MG 24 hr tablet Take 1 tablet (100 mg total) by mouth once daily. 90 tablet 3    omeprazole (PRILOSEC) 40 MG capsule Take 40 mg by mouth once daily.  8    ONETOUCH ULTRA TEST Strp 4 (four) times daily. Use to test blood sugar four times a day.  3    tamsulosin (FLOMAX) 0.4 mg Cap Take 1 capsule by mouth once daily. Pt has not started taking as of 19.  0    vitamin D (VITAMIN D3) 1000 units Tab Take 1 tablet (1,000 Units total) by mouth once daily.       Social History:     Social History     Tobacco Use    Smoking status: Former Smoker     Packs/day: 2.00     Years: 30.00     Pack years: 60.00     Types: Cigarettes     Last attempt to quit: 2005     Years since quittin.3    Smokeless tobacco: Never Used   Substance Use Topics    Alcohol use: No     Comment: started ~, reports 1 shot daily, max 3 shots daily, vague about alcohol consumption. Last drink 2018     Family History:     Family History   Problem Relation Age of Onset    Cancer Mother     Cancer Father     Heart disease Father     Obesity Sister     Parkinsonism Brother     No Known Problems Paternal Grandmother     Cancer Paternal Grandfather     Cancer Brother     Diabetes Maternal Grandmother     Stroke Maternal Grandfather     Cirrhosis Neg Hx      Physical Exam:   BP (!) 99/58   Pulse 101   Ht 5' 7" (1.702 m)   Wt 121.7 kg (268 lb 4.8 oz)   BMI 42.02 kg/m²      Constitutional: No apparentr distress, conversant  HEENT: Sclera anicteric, extraocular movements intact  Chest: Tunneled catheter in R upper chest  Neck: No jugular venous distension, no carotid bruits  CV: Regular rate and rhythm, distant heart sounds due to body habitus  Pulm: Clear to auscultation bilaterally  GI: Abdomen soft, non-distended  Extremities: 1+ bilateral pitting lower extremity edema above the knees, warm with palpable pulses  Skin: " Multiple areas of bruising on extemities  Psych: Alert and oriented to person place location, appropriate affect  Neuro: No focal deficits    Labs:     Blood Tests:  Lab Results   Component Value Date    BNP 74 05/22/2019     (L) 05/27/2019    K 3.3 (L) 05/27/2019     05/27/2019    CO2 23 05/27/2019    BUN 41 (H) 05/27/2019    CREATININE 2.6 (H) 05/27/2019     (H) 05/27/2019    HGBA1C 7.1 (H) 05/23/2019    MG 1.5 (L) 05/27/2019    AST 29 05/25/2019    ALT 25 05/25/2019    ALBUMIN 1.6 (L) 05/25/2019    PROT 3.4 (L) 05/25/2019    BILITOT 0.4 05/25/2019    WBC 5.66 05/26/2019    HGB 8.0 (L) 05/26/2019    HCT 24.3 (L) 05/26/2019    HCT 32 (L) 01/28/2019    MCV 89 05/26/2019     05/26/2019    INR 1.2 05/24/2019    TSH 2.211 01/28/2019       Lab Results   Component Value Date    CHOL 85 (L) 01/28/2019    HDL 31 (L) 01/28/2019    TRIG 56 01/28/2019       Lab Results   Component Value Date    LDLCALC 42.8 (L) 01/28/2019       Urine Tests:  Lab Results   Component Value Date    COLORU Yellow 05/23/2019    APPEARANCEUA Clear 05/23/2019    PHUR 5.0 05/23/2019    SPECGRAV 1.010 05/23/2019    PROTEINUA Negative 05/23/2019    GLUCUA Negative 05/23/2019    KETONESU Negative 05/23/2019    BILIRUBINUA Negative 05/23/2019    OCCULTUA Negative 05/23/2019    NITRITE Negative 05/23/2019    LEUKOCYTESUR Negative 05/23/2019    PROTEINURINE <7 05/23/2019    CREATRANDUR 60.0 05/23/2019    CREATRANDUR 60.0 05/23/2019    UTPCR Unable to calculate 05/23/2019       Imaging:     Echocardiogram  TTE 5/23/19  · Normal left ventricular systolic function. The estimated ejection fraction is 65%  · No wall motion abnormalities.  · Normal LV diastolic function.  · Normal right ventricular systolic function.    TTE 3/18/19  · Technically difficult study  · Mildly decreased left ventricular systolic function. The estimated ejection fraction is roughly 40% (difficult to assess even with echo contrast).  · Normal right ventricular  systolic function.  · Left ventricular diastolic dysfunction.  · Normal central venous pressure (3 mm Hg).    TTE 19  · Technically difficult study - BMI > 50  · Moderately decreased left ventricular systolic function. The estimated ejection fraction is 30%  · Normal right ventricular systolic function.  · Grade I (mild) left ventricular diastolic dysfunction consistent with impaired relaxation.    Stress testing  None    Cath Lab  [facility] 3/20/19  · Prox LAD lesion , 95% stenosed.  · Prox Cx to Mid Cx lesion , 75% stenosed.  · Mid Cx to Dist Cx lesion , 90% stenosed.  · Prox RCA  with left to right collaterals from LAD to PDA  · Patent LIMA to LAD  · Patent SVG to OM2  ·  of SVG to RCA  · Estimated blood loss: none    Left Main   There is mild diffuse disease throughout the vessel.   Left Anterior Descending   Prox LAD lesion is 95% stenosed.   First Diagonal Branch   There is moderate diffuse disease throughout the vessel.   Left Circumflex   Prox Cx to Mid Cx lesion is 75% stenosed. The lesion was previously treated using a stent of unknown type.   Mid Cx to Dist Cx lesion is 90% stenosed. The lesion was previously treated using a stent of unknown type.   First Obtuse Marginal Branch   There is moderate diffuse disease throughout the vessel.   Right Coronary Artery   Prox RCA to Mid RCA lesion is 100% stenosed.   Inferior Septal   Collaterals   Inf Sept filled by collaterals from 2nd Sept.      Graft to 2nd Mrg   LIMA Graft to Mid LAD     Intervention   No interventions have been documented.    Other  None    [id number]  Sinus tachycardia  Otherwise normal ECG    Assessment:     1. Venous stasis dermatitis of both lower extremities    2. Paroxysmal atrial fibrillation    3. Hypertension associated with diabetes    4. Hyperlipidemia associated with type 2 diabetes mellitus    5. Coronary artery disease due to calcified coronary lesion      Plan:     Cardiomyopathy with subsequent LVEF recovery  Most  "recent echo in 5/2019 with normal LVEF, normal RV systolic function, and normal diastolic function  Recent angiogram with patient LIMA-LAD, SVG-OM; SVG-RCA  with L->R collaterals from LAD  Continue toprol 50mg daily.    Most likely etiology of recurrent ascites is liver disease vs pancreatic etiology.   Intravascularly appears euvolemic. Has no jugular venous distension.   Has normal RV function; would expect R sided heart failure if there were an underlying cardiac etiology to explain recurrent ascites.   Recent C with patent L sided grafts and collaterals present to  of RCA/SVG-RCA; no additional revascularization needed.      Continue lasix 80mg BID.  Unable to use spironolactone for ascites due to ELIEZER and hyperkalemia.    He is of increased risk for complications with procedures given his multiple medical co-morbidities. RCRI score of 5 (intraperitoneal procedure, prior ischemic heart disease, prior heart failure, on insulin, Cr >2) is consistent with 15% gulshan-operative risk of major adverse cardiac event (cardiac death, nonfatal MI, nonfatal cardiac arrest). However, given that he had a recent angiogram without need for revascularization, normalization of LVEF, and is intravascularly euvolemic, there are no cardiac interventions that will lower this risk. He can proceed with procedures to evaluate the pancreas, if needed, knowing of his elevated risk.     Liver disease  - recent biopsy without cirrhosis at biopsy location  - CT abd 3/16/19 with "findings consistent with the patient's known liver disease including lobulated hepatic contour, splenomegaly, and ascites"  - abdominal US 3/13/19 with "The liver measures 13.3 cm and demonstrates a nodular contour suggesting cirrhosis."    Coronary artery disease due to calcified coronary lesion  S/p CABG (LIMA-LAD, SVG-OM,  RCA with L->R collaterals - [facility] 3/20/2019)  No ischemic symptoms.   Continue plavix 75mg daily.   Continue beta blocker, as above.  LDL " 42 due to cirrhosis. Continue atorvastatin 40mg daily.    Paroxysmal atrial fibrillation  Discontinue amiodarone, given liver disease and episode precipitated by large volume paracentesis  On beta blocker, as above, for rate control.  On eliquis 5mg BID for anticoagulation    Essential hypertension  No longer on anti-hypertensives.  Now with low blood pressures.    Mixed hyperlipidemia  LDL 42 in 1/2019    Lymphedema  Prior success with brinda boots.    Signed:  Samuel Carney MD  Cardiology     5/30/2019 5:40 PM    Follow-up:     Future Appointments   Date Time Provider Department Center   5/30/2019  4:30 PM Jaime Carney III, MD [facility] CARDIO [place]   8/2/2019 11:20 AM Leon Barajas DO [facility] IM [place]

## 2019-05-27 NOTE — ASSESSMENT & PLAN NOTE
Patient with abnormal Cr dating back to 3/12 with acute worsening of renal function on 3/27. Of note, patient was discharge on 3/21 the day after he received 125ml of contrast load in his angiogram. The next metabolic panel on 3/27 displayed his worsening renal function. Patient has multiple risk factors for CKD with HTN, CAD/CHF, Obesity, DM and risk factors for acute worsening with fluid shifts related to repeat paracenteses in the setting of likely prior JENNY. Patient presented on 5/22 with Cr around his new baseline at Cr 2.3 and improved overnight with administration of lasix 60mg IV once before next lab draw and pRBC.     Patient lying flat, speaking in full sentences, has significant peripheral lymphedema (chronic) but does not appear to be grossly overloaded with pulmonary edema on CXR and no rales on examination    Plan:  Suggest modification of diuretic to Lasix 60mg IV BID  Daily CMP, Mag, Phos  Strict I's and O's  Daily weights  Avoid NSAIDS and other nephrotoxins  Renally dose all medications  US RP displayed decreased perfusion bilaterally.  Otherwise the kidneys appear unremarkable without evidence for hydronephrosis. Ascites.

## 2019-05-28 LAB
ALBUMIN SERPL BCP-MCNC: 1.6 G/DL (ref 3.5–5.2)
ANION GAP SERPL CALC-SCNC: 7 MMOL/L (ref 8–16)
BUN SERPL-MCNC: 41 MG/DL (ref 8–23)
CALCIUM SERPL-MCNC: 7 MG/DL (ref 8.7–10.5)
CHLORIDE SERPL-SCNC: 106 MMOL/L (ref 95–110)
CO2 SERPL-SCNC: 23 MMOL/L (ref 23–29)
CREAT SERPL-MCNC: 2.4 MG/DL (ref 0.5–1.4)
EST. GFR  (AFRICAN AMERICAN): 31.8 ML/MIN/1.73 M^2
EST. GFR  (NON AFRICAN AMERICAN): 27.5 ML/MIN/1.73 M^2
GLUCOSE SERPL-MCNC: 165 MG/DL (ref 70–110)
MAGNESIUM SERPL-MCNC: 1.5 MG/DL (ref 1.6–2.6)
PHOSPHATE SERPL-MCNC: 3.4 MG/DL (ref 2.7–4.5)
POCT GLUCOSE: 182 MG/DL (ref 70–110)
POCT GLUCOSE: 227 MG/DL (ref 70–110)
POCT GLUCOSE: 256 MG/DL (ref 70–110)
POCT GLUCOSE: 281 MG/DL (ref 70–110)
POCT GLUCOSE: 294 MG/DL (ref 70–110)
POTASSIUM SERPL-SCNC: 2.9 MMOL/L (ref 3.5–5.1)
SODIUM SERPL-SCNC: 136 MMOL/L (ref 136–145)

## 2019-05-28 PROCEDURE — 80069 RENAL FUNCTION PANEL: CPT

## 2019-05-28 PROCEDURE — 63600175 PHARM REV CODE 636 W HCPCS: Performed by: STUDENT IN AN ORGANIZED HEALTH CARE EDUCATION/TRAINING PROGRAM

## 2019-05-28 PROCEDURE — 25000003 PHARM REV CODE 250: Performed by: HOSPITALIST

## 2019-05-28 PROCEDURE — 11000001 HC ACUTE MED/SURG PRIVATE ROOM

## 2019-05-28 PROCEDURE — 25000003 PHARM REV CODE 250: Performed by: PHYSICIAN ASSISTANT

## 2019-05-28 PROCEDURE — 99233 SBSQ HOSP IP/OBS HIGH 50: CPT | Mod: ,,, | Performed by: HOSPITALIST

## 2019-05-28 PROCEDURE — 99233 PR SUBSEQUENT HOSPITAL CARE,LEVL III: ICD-10-PCS | Mod: ,,, | Performed by: HOSPITALIST

## 2019-05-28 PROCEDURE — 83735 ASSAY OF MAGNESIUM: CPT

## 2019-05-28 PROCEDURE — 63600175 PHARM REV CODE 636 W HCPCS: Performed by: HOSPITALIST

## 2019-05-28 PROCEDURE — G0378 HOSPITAL OBSERVATION PER HR: HCPCS

## 2019-05-28 RX ORDER — BISACODYL 10 MG
10 SUPPOSITORY, RECTAL RECTAL DAILY PRN
Status: DISCONTINUED | OUTPATIENT
Start: 2019-05-28 | End: 2019-05-29 | Stop reason: HOSPADM

## 2019-05-28 RX ORDER — RAMELTEON 8 MG/1
8 TABLET ORAL NIGHTLY PRN
Status: DISCONTINUED | OUTPATIENT
Start: 2019-05-28 | End: 2019-05-29 | Stop reason: HOSPADM

## 2019-05-28 RX ORDER — IPRATROPIUM BROMIDE AND ALBUTEROL SULFATE 2.5; .5 MG/3ML; MG/3ML
3 SOLUTION RESPIRATORY (INHALATION) EVERY 4 HOURS PRN
Status: DISCONTINUED | OUTPATIENT
Start: 2019-05-28 | End: 2019-05-29 | Stop reason: HOSPADM

## 2019-05-28 RX ORDER — POTASSIUM CHLORIDE 20 MEQ/15ML
40 SOLUTION ORAL ONCE
Status: COMPLETED | OUTPATIENT
Start: 2019-05-28 | End: 2019-05-28

## 2019-05-28 RX ORDER — MAGNESIUM SULFATE HEPTAHYDRATE 40 MG/ML
2 INJECTION, SOLUTION INTRAVENOUS ONCE
Status: COMPLETED | OUTPATIENT
Start: 2019-05-28 | End: 2019-05-28

## 2019-05-28 RX ORDER — POLYETHYLENE GLYCOL 3350 17 G/17G
17 POWDER, FOR SOLUTION ORAL DAILY
Status: DISCONTINUED | OUTPATIENT
Start: 2019-05-28 | End: 2019-05-29 | Stop reason: HOSPADM

## 2019-05-28 RX ADMIN — MIDODRINE HYDROCHLORIDE 5 MG: 5 TABLET ORAL at 09:05

## 2019-05-28 RX ADMIN — METOPROLOL SUCCINATE 50 MG: 50 TABLET, EXTENDED RELEASE ORAL at 09:05

## 2019-05-28 RX ADMIN — INSULIN ASPART 1 UNITS: 100 INJECTION, SOLUTION INTRAVENOUS; SUBCUTANEOUS at 09:05

## 2019-05-28 RX ADMIN — ONDANSETRON 8 MG: 8 TABLET, ORALLY DISINTEGRATING ORAL at 06:05

## 2019-05-28 RX ADMIN — FUROSEMIDE 60 MG: 10 INJECTION, SOLUTION INTRAMUSCULAR; INTRAVENOUS at 09:05

## 2019-05-28 RX ADMIN — CLOPIDOGREL 75 MG: 75 TABLET, FILM COATED ORAL at 09:05

## 2019-05-28 RX ADMIN — PANCRELIPASE 2 CAPSULE: 30000; 6000; 19000 CAPSULE, DELAYED RELEASE PELLETS ORAL at 01:05

## 2019-05-28 RX ADMIN — MIDODRINE HYDROCHLORIDE 5 MG: 5 TABLET ORAL at 02:05

## 2019-05-28 RX ADMIN — ATORVASTATIN CALCIUM 40 MG: 20 TABLET, FILM COATED ORAL at 09:05

## 2019-05-28 RX ADMIN — MAGNESIUM SULFATE IN WATER 2 G: 40 INJECTION, SOLUTION INTRAVENOUS at 02:05

## 2019-05-28 RX ADMIN — PANTOPRAZOLE SODIUM 40 MG: 40 TABLET, DELAYED RELEASE ORAL at 09:05

## 2019-05-28 RX ADMIN — VITAMIN D, TAB 1000IU (100/BT) 1000 UNITS: 25 TAB at 09:05

## 2019-05-28 RX ADMIN — TAMSULOSIN HYDROCHLORIDE 0.4 MG: 0.4 CAPSULE ORAL at 09:05

## 2019-05-28 RX ADMIN — POTASSIUM CHLORIDE 40 MEQ: 20 SOLUTION ORAL at 01:05

## 2019-05-28 RX ADMIN — APIXABAN 5 MG: 5 TABLET, FILM COATED ORAL at 09:05

## 2019-05-28 RX ADMIN — PANCRELIPASE 2 CAPSULE: 30000; 6000; 19000 CAPSULE, DELAYED RELEASE PELLETS ORAL at 06:05

## 2019-05-28 RX ADMIN — PANCRELIPASE 2 CAPSULE: 30000; 6000; 19000 CAPSULE, DELAYED RELEASE PELLETS ORAL at 09:05

## 2019-05-28 RX ADMIN — FUROSEMIDE 60 MG: 10 INJECTION, SOLUTION INTRAMUSCULAR; INTRAVENOUS at 06:05

## 2019-05-28 NOTE — PROGRESS NOTES
Hospital Medicine  Progress note    Team: [facility] HOSP MED R Roly Caba MD  Admit Date: 5/22/2019  [place] 5/29/2019  Length of Stay:  LOS: 6 days   Code status: Full Code    Principal Problem:  Symptomatic anemia    Overview:    Interval hx: Lisseth seen and examined at bedside, AKBAR. Feels like the fluid is his legs has improved tremendously and he is happy about this.complains about lack of progress regarding his future plan of care. Seen by nephrology who recommends Lasix 80mg PO BID, repeat IR paracentesis ordered for tomorrow.     ROS   Constitutional: Negative for chills, fatigue, fever.   HENT: Negative for sore throat, trouble swallowing.    Eyes: Negative for photophobia, visual disturbance.   Respiratory: Negative for cough, +ve shortness of breath on exertion.    Cardiovascular: Negative for chest pain, palpitations, +ve leg swelling (chronic lymphedema).   Gastrointestinal: Negative for abdominal pain, constipation, diarrhea, nausea, vomiting.   Endocrine: Negative for cold intolerance, heat intolerance.   Genitourinary: Negative for dysuria, frequency.   Musculoskeletal: Negative for arthralgias, myalgias.   Skin: Negative for rash, wound, erythema. +ve easy bruising.   Neurological: Negative for dizziness, syncope, +ve weakness, postural light-headedness.   Psychiatric/Behavioral: Negative for confusion, hallucinations, anxiety  All other systems reviewed and are negative.    PEx  Temp:  [96.7 °F (35.9 °C)-98.4 °F (36.9 °C)]   Pulse:  []   Resp:  [14-20]   BP: ()/(52-63)   SpO2:  [99 %-100 %]     Intake/Output Summary (Last 24 hours) at 5/28/2019 1834  Last data filed at 5/28/2019 1800  Gross per 24 hour   Intake 120 ml   Output 1525 ml   Net -1405 ml     Constitutional: Appears well-developed and well-nourished. Obese.  Head: Normocephalic and atraumatic.   Mouth/Throat: Oropharynx is clear and moist.   Eyes: EOM are normal. Pupils are equal, round, and reactive to light. No scleral  icterus.   Neck: Normal range of motion. Neck supple.   Cardiovascular: Normal rate and regular rhythm.  No murmur heard. R port in place.  Pulmonary/Chest: Effort normal and breath sounds normal. No respiratory distress. No wheezes, rales, or rhonchi  Abdominal: Soft. Bowel sounds are normal.  No distension or tenderness  Musculoskeletal: Normal range of motion. +ve edema. B/L LE wrapped.   Neurological: Alert and oriented to person, place, and time.   Skin: Skin is warm and dry. Multiple ecchymosis b/l UE.  Psychiatric: Normal mood and affect. Behavior is normal.     Recent Labs   Lab 05/24/19  0530 05/25/19  0932 05/26/19  0119   WBC 6.30 5.16 5.66   HGB 8.6* 8.0* 8.0*   HCT 26.1* 25.4* 24.3*    195 185     Recent Labs   Lab 05/22/19  1531  05/24/19  0530 05/25/19  0932 05/26/19  0907 05/27/19  1031 05/28/19  0423   *   < > 135* 133* 134* 135* 136   K 3.6   < > 3.2* 3.5 3.5 3.3* 2.9*      < > 107 106 105 105 106   CO2 19*   < > 23 21* 22* 23 23   BUN 50*   < > 43* 42* 41* 41* 41*   CREATININE 2.3*   < > 1.9* 2.3* 2.3* 2.6* 2.4*   *   < > 120* 300* 257* 246* 165*   CALCIUM 6.8*   < > 6.9* 6.8* 6.9* 7.0* 7.0*   MG 1.7   < > 1.5* 1.3*  --  1.5* 1.5*   PHOS 4.6*   < > 3.8 3.1  --   --  3.4   LIPASE <3*  --   --   --   --   --   --     < > = values in this interval not displayed.     Recent Labs   Lab 05/22/19  1531 05/23/19  0448 05/24/19  0530 05/25/19  0932 05/28/19  0423   ALKPHOS 117 116 121 106  --    ALT 19 21 25 25  --    AST 20 22 33 29  --    ALBUMIN 1.7* 1.7* 1.4* 1.6* 1.6*   PROT 3.9* 4.0* 3.5* 3.4*  --    BILITOT 0.3 0.4 0.4 0.4  --    INR 1.1 1.1 1.2  --   --         No results for input(s): CPK, CPKMB, MB, TROPONINI in the last 72 hours.  Recent Labs   Lab 05/27/19  0826 05/27/19  1154 05/27/19  2133 05/28/19  0836 05/28/19  1240 05/28/19  1628   POCTGLUCOSE 160* 261* 227* 182* 281* 294*     Hemoglobin A1C   Date Value Ref Range Status   05/23/2019 7.1 (H) 4.0 - 5.6 % Final      Comment:     ADA Screening Guidelines:  5.7-6.4%  Consistent with prediabetes  >or=6.5%  Consistent with diabetes  High levels of fetal hemoglobin interfere with the HbA1C  assay. Heterozygous hemoglobin variants (HbS, HgC, etc)do  not significantly interfere with this assay.   However, presence of multiple variants may affect accuracy.     03/18/2019 7.4 (H) 4.0 - 5.6 % Final     Comment:     ADA Screening Guidelines:  5.7-6.4%  Consistent with prediabetes  >or=6.5%  Consistent with diabetes  High levels of fetal hemoglobin interfere with the HbA1C  assay. Heterozygous hemoglobin variants (HbS, HgC, etc)do  not significantly interfere with this assay.   However, presence of multiple variants may affect accuracy.     01/28/2019 6.9 (H) 4.0 - 5.6 % Final     Comment:     ADA Screening Guidelines:  5.7-6.4%  Consistent with prediabetes  >or=6.5%  Consistent with diabetes  High levels of fetal hemoglobin interfere with the HbA1C  assay. Heterozygous hemoglobin variants (HbS, HgC, etc)do  not significantly interfere with this assay.   However, presence of multiple variants may affect accuracy.         Scheduled Meds:   apixaban  5 mg Oral BID    atorvastatin  40 mg Oral Daily    clopidogrel  75 mg Oral Daily    furosemide  60 mg Intravenous BID    lipase-protease-amylase 6,000-19,000-30,000 units  2 capsule Oral TID WM    metoprolol succinate  50 mg Oral Daily    midodrine  5 mg Oral TID    pantoprazole  40 mg Oral Daily    tamsulosin  1 capsule Oral Daily    vitamin D  1,000 Units Oral Daily     Continuous Infusions:  As Needed:  sodium chloride, acetaminophen, dextrose 50%, dextrose 50%, glucagon (human recombinant), glucose, glucose, insulin aspart U-100, ondansetron, sodium chloride 0.9%, sodium chloride 0.9%    Active Hospital Problems    Diagnosis  POA    *Symptomatic anemia [D64.9]  Yes    Alteration in skin integrity [R23.9]  Yes    Venous stasis dermatitis of both lower extremities [I87.2]  Yes     Stage 3 chronic kidney disease [N18.3]  Yes    Acute renal failure with specified lesion [N17.8]  Yes    Hypertension associated with diabetes [E11.59, I10]  Yes    Paroxysmal atrial fibrillation [I48.0]  Yes    Ascites [R18.8]  Yes    DM type 2 with diabetic peripheral neuropathy [E11.42]  Yes    Hypoalbuminemia [E88.09]  Yes    Acute on chronic combined systolic and diastolic heart failure [I50.43]  Yes     1-28-19  Left Ventricle Moderate decreased ejection fraction at 30%. Normal left ventricular cavity size. Normal wall thickness observed. Grade I (mild) left ventricular diastolic dysfunction consistent with impaired relaxation. Normal left atrial pressure. Moderate, global hypokinesis(see wall scoring diagram).   Right Ventricle Normal cavity size, wall thickness and ejection fraction. Wall motion normal.   Left Atrium Normal atrial size. .   Right Atrium Normal atrial size.   Aortic Valve The valve is trileaflet. Aortic valve sclerosis is mild. No stenosis. No regurgitation. Mobility is normal   Mitral Valve Normal valve structure. No stenosis. No regurgitation. Normal leaflet mobility.   Tricuspid Valve Tricuspid valve not well visualized. Trace regurgitation.   Pulmonic Valve The pulmonic valve was not well visualized.   IVC/SVC Inferior vena cava is not well visualized.   Ascending Aorta The aortic root and ascending aorta are normal in size.   Pericardium No pericardial effusion.           Anasarca [R60.1]  Yes    Chronic pancreatitis [K86.1]  Yes    Morbid obesity with BMI of 50.0-59.9, adult [E66.01, Z68.43]  Not Applicable      Resolved Hospital Problems   No resolved problems to display.         Assessment and Plan    # Symptomatic Anemia  - no evidence of GI bleed. Previous iron panel in march indicative of AoCD.   - transfused 1 unit of PRBC  - Hgb stable   - Nutrition consult placed.      # Acute on chronic systolic and diastolic heart failure  - TTE ordered, showed normal EF and  diastolic function  - lasix 60 mg IV transitioned to Oral Torsemide BID as per nephrology   - daily weights  - strict I/Os     # Ascites  - The etiology of this patient's significant ascites remains elusive. He has seen both hepatology and cardiology. A liver biopsy was -ve for clinically significant cirrhosis and portal hypertension. A 2D echo during this evaluation did not show any significant abnormalities, though it should be noted that previous studies have identified both diastolic and systolic dysfunction. He has a history of CAD with multiple stents placed in the past which make CM likely. He is on a beta blocker and diuretic, but his BP is making it difficult to adequately titrate these to maximal effect.   - h/o multiple paracenteses, most recently 5/24 (-2L)  - other potential etiology includes chronic pancreatitis (leak?) vs peritoneal carcinomatosis though fluid analysis does not really reflect this. Previous amylase x2 wnl. Cytology did not show malignant cells.   - will need recurrent IR labs on a q weekly or q bimonthly basis.   - have placed nutrition consult due to hypoalbuminemia, h/o gastric bypass, and presumably poor absorption in the setting of chronic pancreatitis.  - will need serial IR paracenteses ordered upon discharge. Patient requesting Mon/Fri.   - IR Paracentesis tomorrow      # Essential HTN   - hypotensive throughout hospitalization. Metoprolol titrated down; continue at 50 mg. Discontinuing torsemide, and restarting lasix, per nephrology's recommendations.      # DM2 with HTN and neuropathy and CKD stage 3  - Hba1c 7.1  - SSI; on home insulin      # Paroxysmal atrial fibrillation  - cont toprol and eliquis  - toprol titrated down due to persistent hypotension, weakness and dizziness.     # Morbid Obesity  Body mass index is 44.95 kg/m².  Nutrition consult placed. Weight loss recommended.     #Hypoalbuminemia   - UA negative for protein  - Liver biopsy negative for cirrhosis   -  might benefit from GI referral for EGD/Colonoscopy  - nutrition consult    # LE Venous stasis ulcers  - wound care consult hydrofera blue placed to left leg     # ELIEZER on CKD stage 3  - urine lytes and nephrology consult as per patient request; renal U/S showed no hydronephrosis   - nephrology consulted. Appreciate assistance in managing complex patient's volume status and CKD, now with Cr bump to 2.6     # Chronic pancreatitis  - cont creon  - past h/o alcohol abuse  - could explain recurrent ascites in the absense of cirrhosis     High Risk Conditions  DM, BERHANE, HTN, Extreme Obesity      Diet: diabetic diet   GI PPx:   DVT PPx:    Anticoagulants   Medication Route Frequency    apixaban tablet 5 mg Oral BID       Goals of Care: Full code   Discharge plan: pending IR paracentesis and Pt./OT recs     Time (minutes) spent in care of the patient (Greater than 1/2 spent in direct face-to-face contact) 45 minutes     Roly Sanabria MD  Staff Hospitalist  Department of Hospital Medicine  [facility]   [phone]

## 2019-05-28 NOTE — SUBJECTIVE & OBJECTIVE
Interval history: Cr 2.6 to 2.4 with single dose of IV Lasix on 5/27 - UOP appears to have improved with IV lasix. Patient feels symptomatically better with wound care's treatment of legs and with overall hospital care. Will continue IV lasix while in hospital. Patient needs electrolyte replacement.     Review of patient's allergies indicates:  No Known Allergies  Current Facility-Administered Medications   Medication Frequency    0.9%  NaCl infusion (for blood administration) Q24H PRN    acetaminophen tablet 650 mg Q4H PRN    apixaban tablet 5 mg BID    atorvastatin tablet 40 mg Daily    clopidogrel tablet 75 mg Daily    dextrose 50% injection 12.5 g PRN    dextrose 50% injection 25 g PRN    furosemide injection 60 mg BID    glucagon (human recombinant) injection 1 mg PRN    glucose chewable tablet 16 g PRN    glucose chewable tablet 24 g PRN    insulin aspart U-100 pen 0-5 Units QID (AC + HS) PRN    lipase-protease-amylase 6,000-19,000-30,000 units per capsule 2 capsule TID WM    magnesium sulfate 2g in water 50mL IVPB (premix) Once    metoprolol succinate (TOPROL-XL) 24 hr tablet 50 mg Daily    midodrine tablet 5 mg TID    ondansetron disintegrating tablet 8 mg Q8H PRN    pantoprazole EC tablet 40 mg Daily    sodium chloride 0.9% flush 10 mL PRN    sodium chloride 0.9% flush 10 mL PRN    tamsulosin 24 hr capsule 0.4 mg Daily    vitamin D 1000 units tablet 1,000 Units Daily     Review of Systems   Constitutional: Positive for fatigue. Negative for chills and fever.   HENT: Negative.  Negative for postnasal drip and sore throat.    Eyes: Negative.    Respiratory: Positive for shortness of breath. Negative for chest tightness.    Cardiovascular: Positive for palpitations and leg swelling. Negative for chest pain.   Gastrointestinal: Positive for abdominal distention. Negative for abdominal pain, blood in stool, constipation and nausea.   Endocrine: Negative.    Genitourinary: Negative for  dysuria.   Musculoskeletal: Negative for arthralgias, back pain and joint swelling.   Skin: Negative.    Allergic/Immunologic: Negative.    Neurological: Negative.  Negative for light-headedness and headaches.   Hematological: Negative.    Psychiatric/Behavioral: Negative for confusion and dysphoric mood. The patient is not nervous/anxious and is not hyperactive.      Objective:     Vital Signs (Most Recent):  Temp: 98.2 °F (36.8 °C) (05/28/19 1113)  Pulse: 80 (05/28/19 1146)  Resp: 18 (05/28/19 1113)  BP: 117/63 (05/28/19 1113)  SpO2: 99 % (05/28/19 0833)  O2 Device (Oxygen Therapy): room air (05/28/19 0833) Vital Signs (24h Range):  Temp:  [96.7 °F (35.9 °C)-98.2 °F (36.8 °C)] 98.2 °F (36.8 °C)  Pulse:  [] 80  Resp:  [14-20] 18  SpO2:  [99 %] 99 %  BP: (102-117)/(52-63) 117/63     Weight: 124.4 kg (274 lb 4 oz) (05/28/19 0400)  Body mass index is 42.95 kg/m².  Body surface area is 2.43 meters squared.    I/O last 3 completed shifts:  In: 600 [P.O.:600]  Out: 1645 [Urine:1645]    Physical Exam   Constitutional: He appears well-developed.  Non-toxic appearance. He appears ill. No distress.   Obese male  Lying flat in bed on room air - able to speak in full sentences  Bruises about BL UE, wrapped LE BL with swelling   PICC line R chest wall   HENT:   Head: Normocephalic and atraumatic.   Mouth/Throat: Abnormal dentition.   Eyes: Pupils are equal, round, and reactive to light.   Cardiovascular: Normal rate, S1 normal, S2 normal and intact distal pulses.   Murmur heard.  Pulses:       Radial pulses are 2+ on the right side, and 2+ on the left side.        Dorsalis pedis pulses are 2+ on the right side, and 2+ on the left side.   Pulmonary/Chest: Effort normal.   Abdominal: Soft. He exhibits no distension. There is no tenderness. There is no guarding.   Musculoskeletal: He exhibits edema (improving with proper wrapping, diuretics).   Lymphadenopathy:   Significant lymphedema BL LE   Neurological: He is alert. GCS  eye subscore is 4. GCS verbal subscore is 5. GCS motor subscore is 6.   Skin: Skin is warm. There is pallor.   Nursing note and vitals reviewed.      Significant Labs:  All labs within the past 24 hours have been reviewed.    Significant Imaging:  Labs: Reviewed  ECG: Reviewed  Echo: Reviewed

## 2019-05-28 NOTE — ASSESSMENT & PLAN NOTE
· Patient with chronic pancreatitis on enzyme replacement  · Has persistent and worsening hypoalbuminemia   · UA without any protein loss  · Suggest patient be re-evaluated by GI specialist regarding enzyme replacement

## 2019-05-28 NOTE — PROGRESS NOTES
[facility]  Nephrology  Progress Note    Patient Name: Jian Arrieta  MRN: [id number]  Admission Date: 5/22/2019  Hospital Length of Stay: 6 days  Attending Provider: Roly Caba MD   Primary Care Physician: Leon Barajas DO  Principal Problem:Symptomatic anemia    Subjective:     HPI: Mr. Jian Arrieta is a 63 year old male with CAD (s/p PCI with 5 stents) with subsequent CABG x 3v (2017 at ), chronic combined systolic and diastolic heart failure, HLD, DM2 on insulin, BERHANE on CPAP, chronic pancreatitis with pseudocyst being admitted to observation for shortness of breath. Patient had a paracentesis and lower extremity US 5/23. He endorses SOB and intermittent b/l leg swelling x several months. He denies fever, chest pain, abdominal pain, vomiting, melena, or confusion. He is on eliquis.Patient states he feels like he has gained over 25 pounds of fluid.  Feels frustrated that he has not seen a nephrologist during last admission.  Had a paracentesis 5/23 with 3.6L removed.   Nephrology was consulted out of concern for ELIEZER on CKDIII and volume overload.     Of note, patient was hospitalized in March 2019 at which point he was evaluated for cirrhosis. US liver displayed cirrhosis and concern for portal hypertension. He underwent 3 large volume paracentesis during this hospitalization and underwent liver biopsy with transjugular pressure gradient was 2mm hg and biopsy was not consistent with cirrhosis. Additionally, ascitic fluid studies have been consistent with high SAAG and high protein more in line with heart failure.     Interval history: Cr 2.6 to 2.4 with single dose of IV Lasix on 5/27 - UOP appears to have improved with IV lasix. Patient feels symptomatically better with wound care's treatment of legs and with overall hospital care. Will continue IV lasix while in hospital. Patient needs electrolyte replacement.     Review of patient's allergies indicates:  No Known  Allergies  Current Facility-Administered Medications   Medication Frequency    0.9%  NaCl infusion (for blood administration) Q24H PRN    acetaminophen tablet 650 mg Q4H PRN    apixaban tablet 5 mg BID    atorvastatin tablet 40 mg Daily    clopidogrel tablet 75 mg Daily    dextrose 50% injection 12.5 g PRN    dextrose 50% injection 25 g PRN    furosemide injection 60 mg BID    glucagon (human recombinant) injection 1 mg PRN    glucose chewable tablet 16 g PRN    glucose chewable tablet 24 g PRN    insulin aspart U-100 pen 0-5 Units QID (AC + HS) PRN    lipase-protease-amylase 6,000-19,000-30,000 units per capsule 2 capsule TID WM    magnesium sulfate 2g in water 50mL IVPB (premix) Once    metoprolol succinate (TOPROL-XL) 24 hr tablet 50 mg Daily    midodrine tablet 5 mg TID    ondansetron disintegrating tablet 8 mg Q8H PRN    pantoprazole EC tablet 40 mg Daily    sodium chloride 0.9% flush 10 mL PRN    sodium chloride 0.9% flush 10 mL PRN    tamsulosin 24 hr capsule 0.4 mg Daily    vitamin D 1000 units tablet 1,000 Units Daily     Review of Systems   Constitutional: Positive for fatigue. Negative for chills and fever.   HENT: Negative.  Negative for postnasal drip and sore throat.    Eyes: Negative.    Respiratory: Positive for shortness of breath. Negative for chest tightness.    Cardiovascular: Positive for palpitations and leg swelling. Negative for chest pain.   Gastrointestinal: Positive for abdominal distention. Negative for abdominal pain, blood in stool, constipation and nausea.   Endocrine: Negative.    Genitourinary: Negative for dysuria.   Musculoskeletal: Negative for arthralgias, back pain and joint swelling.   Skin: Negative.    Allergic/Immunologic: Negative.    Neurological: Negative.  Negative for light-headedness and headaches.   Hematological: Negative.    Psychiatric/Behavioral: Negative for confusion and dysphoric mood. The patient is not nervous/anxious and is not  hyperactive.      Objective:     Vital Signs (Most Recent):  Temp: 98.2 °F (36.8 °C) (05/28/19 1113)  Pulse: 80 (05/28/19 1146)  Resp: 18 (05/28/19 1113)  BP: 117/63 (05/28/19 1113)  SpO2: 99 % (05/28/19 0833)  O2 Device (Oxygen Therapy): room air (05/28/19 0833) Vital Signs (24h Range):  Temp:  [96.7 °F (35.9 °C)-98.2 °F (36.8 °C)] 98.2 °F (36.8 °C)  Pulse:  [] 80  Resp:  [14-20] 18  SpO2:  [99 %] 99 %  BP: (102-117)/(52-63) 117/63     Weight: 124.4 kg (274 lb 4 oz) (05/28/19 0400)  Body mass index is 42.95 kg/m².  Body surface area is 2.43 meters squared.    I/O last 3 completed shifts:  In: 600 [P.O.:600]  Out: 1645 [Urine:1645]    Physical Exam   Constitutional: He appears well-developed.  Non-toxic appearance. He appears ill. No distress.   Obese male  Lying flat in bed on room air - able to speak in full sentences  Bruises about BL UE, wrapped LE BL with swelling   PICC line R chest wall   HENT:   Head: Normocephalic and atraumatic.   Mouth/Throat: Abnormal dentition.   Eyes: Pupils are equal, round, and reactive to light.   Cardiovascular: Normal rate, S1 normal, S2 normal and intact distal pulses.   Murmur heard.  Pulses:       Radial pulses are 2+ on the right side, and 2+ on the left side.        Dorsalis pedis pulses are 2+ on the right side, and 2+ on the left side.   Pulmonary/Chest: Effort normal.   Abdominal: Soft. He exhibits no distension. There is no tenderness. There is no guarding.   Musculoskeletal: He exhibits edema (improving with proper wrapping, diuretics).   Lymphadenopathy:   Significant lymphedema BL LE   Neurological: He is alert. GCS eye subscore is 4. GCS verbal subscore is 5. GCS motor subscore is 6.   Skin: Skin is warm. There is pallor.   Nursing note and vitals reviewed.      Significant Labs:  All labs within the past 24 hours have been reviewed.    Significant Imaging:  Labs: Reviewed  ECG: Reviewed  Echo: Reviewed    Assessment/Plan:     Stage 3 chronic kidney  disease  Patient with abnormal Cr dating back to 3/12 with acute worsening of renal function on 3/27. Of note, patient was discharge on 3/21 the day after he received 125ml of contrast load in his angiogram. The next metabolic panel on 3/27 displayed his worsening renal function. Patient has multiple risk factors for CKD with HTN, CAD/CHF, Obesity, DM and risk factors for acute worsening with fluid shifts related to repeat paracenteses in the setting of likely prior JENNY. Patient presented on 5/22 with Cr around his new baseline at Cr 2.3 and improved overnight with administration of lasix 60mg IV once before next lab draw and pRBC.     Patient lying flat, speaking in full sentences, has significant peripheral lymphedema (chronic) but does not appear to be grossly overloaded with pulmonary edema on CXR and no rales on examination    Plan:  Continue Lasix 60mg IV BID while inpatient. Suggest Lasix 80mg PO BID on discharge.   Daily CMP, Mag, Phos while IP  Will need close follow up (metabolic panel in 1 week following discharge with Mag)   Strict I's and O's  Daily weights  Avoid NSAIDS and other nephrotoxins  Renally dose all medications  US RP displayed decreased perfusion bilaterally.  Otherwise the kidneys appear unremarkable without evidence for hydronephrosis. Ascites.    Hypoalbuminemia  · Patient with chronic pancreatitis on enzyme replacement  · Has persistent and worsening hypoalbuminemia   · UA without any protein loss  · Suggest patient be re-evaluated by GI specialist regarding enzyme replacement     Acute on chronic combined systolic and diastolic heart failure  Repeat echo without evidence of systolic or diastolic dysfunction          Thank you for your consult. I will follow-up with patient. Please contact us if you have any additional questions.    Leon Boston MD  Nephrology  [facility]

## 2019-05-28 NOTE — ASSESSMENT & PLAN NOTE
Patient with abnormal Cr dating back to 3/12 with acute worsening of renal function on 3/27. Of note, patient was discharge on 3/21 the day after he received 125ml of contrast load in his angiogram. The next metabolic panel on 3/27 displayed his worsening renal function. Patient has multiple risk factors for CKD with HTN, CAD/CHF, Obesity, DM and risk factors for acute worsening with fluid shifts related to repeat paracenteses in the setting of likely prior JENNY. Patient presented on 5/22 with Cr around his new baseline at Cr 2.3 and improved overnight with administration of lasix 60mg IV once before next lab draw and pRBC.     Patient lying flat, speaking in full sentences, has significant peripheral lymphedema (chronic) but does not appear to be grossly overloaded with pulmonary edema on CXR and no rales on examination    Plan:  Continue Lasix 60mg IV BID while inpatient. Suggest Lasix 80mg PO BID on discharge.   Daily CMP, Mag, Phos while IP  Will need close follow up (metabolic panel in 1 week following discharge with Mag)   Strict I's and O's  Daily weights  Avoid NSAIDS and other nephrotoxins  Renally dose all medications  US RP displayed decreased perfusion bilaterally.  Otherwise the kidneys appear unremarkable without evidence for hydronephrosis. Ascites.

## 2019-05-28 NOTE — PHYSICIAN QUERY
"PT Name: Jian Arrieta  MR #: [id number]    Physician Query Form - Hematology Clarification      CDS/: Glenys Edwards RN CCDS             Contact information:[email or web address]    This form is a permanent document in the medical record.      Query Date: May 28, 2019    By submitting this query, we are merely seeking further clarification of documentation. Please utilize your independent clinical judgment when addressing the question(s) below.    The Medical record contains the following:   Indicators  Supporting Clinical Findings Location in Medical Record   x "Anemia" documented Symptomatic anemia  H&P-PN 5/28   x H & H = H/H= 8.8/26.1; 8.8/26 ; 8.6/26.1; 8.0/25.4; 8.0/24.3 Lab 5/22-5/26    BP =                     HR=     x "GI bleeding" documented NO evidence of GI bleed H&P    Acute bleeding (Non GI site)     x Transfusion(s) PRBC transfusion MD orders    Treatment:     x Other:  Previous iron panel in march indicative of AoCD.  CKD 3  Chronic pancreatitis  ascites xPN 5/26-5/27    H&P     Provider, please specify diagnosis or diagnoses associated with above clinical findings.    [  ] Acute blood loss anemia   [  ] Iron deficiency anemia   [  ] Chronic blood loss anemia     [ x ] Anemia of chronic disease ( Specify chronic disease)       [ x ] CKD (specify stage):   [  ] Other (Specify):   [  ] Clinically Undetermined       [  ] Other Hematological Diagnosis (please specify):     [  ] Clinically Undetermined       Please document in your progress notes daily for the duration of treatment, until resolved, and include in your discharge summary.                                                                                                      "

## 2019-05-29 VITALS
HEIGHT: 67 IN | RESPIRATION RATE: 18 BRPM | WEIGHT: 272.06 LBS | OXYGEN SATURATION: 97 % | BODY MASS INDEX: 42.7 KG/M2 | SYSTOLIC BLOOD PRESSURE: 91 MMHG | HEART RATE: 72 BPM | DIASTOLIC BLOOD PRESSURE: 50 MMHG | TEMPERATURE: 97 F

## 2019-05-29 LAB
ALBUMIN SERPL BCP-MCNC: 1.7 G/DL (ref 3.5–5.2)
AMYLASE, BODY FLUID: 26 U/L
ANION GAP SERPL CALC-SCNC: 8 MMOL/L (ref 8–16)
APPEARANCE FLD: NORMAL
BASOPHILS # BLD AUTO: 0.06 K/UL (ref 0–0.2)
BASOPHILS NFR BLD: 0.8 % (ref 0–1.9)
BODY FLD TYPE: NORMAL
BODY FLUID SOURCE AMYLASE: NORMAL
BUN SERPL-MCNC: 40 MG/DL (ref 8–23)
CALCIUM SERPL-MCNC: 7.5 MG/DL (ref 8.7–10.5)
CHLORIDE SERPL-SCNC: 103 MMOL/L (ref 95–110)
CO2 SERPL-SCNC: 24 MMOL/L (ref 23–29)
COLOR FLD: YELLOW
CREAT SERPL-MCNC: 2.6 MG/DL (ref 0.5–1.4)
DIFFERENTIAL METHOD: ABNORMAL
EOSINOPHIL # BLD AUTO: 0.1 K/UL (ref 0–0.5)
EOSINOPHIL NFR BLD: 0.7 % (ref 0–8)
ERYTHROCYTE [DISTWIDTH] IN BLOOD BY AUTOMATED COUNT: 15.7 % (ref 11.5–14.5)
EST. GFR  (AFRICAN AMERICAN): 28.8 ML/MIN/1.73 M^2
EST. GFR  (NON AFRICAN AMERICAN): 24.9 ML/MIN/1.73 M^2
GLUCOSE SERPL-MCNC: 217 MG/DL (ref 70–110)
HCT VFR BLD AUTO: 27.3 % (ref 40–54)
HGB BLD-MCNC: 8.9 G/DL (ref 14–18)
IMM GRANULOCYTES # BLD AUTO: 0.03 K/UL (ref 0–0.04)
IMM GRANULOCYTES NFR BLD AUTO: 0.4 % (ref 0–0.5)
LYMPHOCYTES # BLD AUTO: 1.8 K/UL (ref 1–4.8)
LYMPHOCYTES NFR BLD: 25 % (ref 18–48)
LYMPHOCYTES NFR FLD MANUAL: 68 %
MAGNESIUM SERPL-MCNC: 1.6 MG/DL (ref 1.6–2.6)
MCH RBC QN AUTO: 28.7 PG (ref 27–31)
MCHC RBC AUTO-ENTMCNC: 32.6 G/DL (ref 32–36)
MCV RBC AUTO: 88 FL (ref 82–98)
MESOTHL CELL NFR FLD MANUAL: 7 %
MONOCYTES # BLD AUTO: 0.5 K/UL (ref 0.3–1)
MONOCYTES NFR BLD: 6.6 % (ref 4–15)
MONOS+MACROS NFR FLD MANUAL: 10 %
NEUTROPHILS # BLD AUTO: 4.9 K/UL (ref 1.8–7.7)
NEUTROPHILS NFR BLD: 66.5 % (ref 38–73)
NEUTROPHILS NFR FLD MANUAL: 15 %
NRBC BLD-RTO: 0 /100 WBC
PHOSPHATE SERPL-MCNC: 3.3 MG/DL (ref 2.7–4.5)
PLATELET # BLD AUTO: 217 K/UL (ref 150–350)
PMV BLD AUTO: 10.4 FL (ref 9.2–12.9)
POTASSIUM SERPL-SCNC: 3.1 MMOL/L (ref 3.5–5.1)
PROT FLD-MCNC: <1 G/DL
RBC # BLD AUTO: 3.1 M/UL (ref 4.6–6.2)
SODIUM SERPL-SCNC: 135 MMOL/L (ref 136–145)
SPECIMEN SOURCE: NORMAL
WBC # BLD AUTO: 7.32 K/UL (ref 3.9–12.7)
WBC # FLD: 57 /CU MM

## 2019-05-29 PROCEDURE — 99238 PR HOSPITAL DISCHARGE DAY,<30 MIN: ICD-10-PCS | Mod: ,,, | Performed by: HOSPITALIST

## 2019-05-29 PROCEDURE — 85025 COMPLETE CBC W/AUTO DIFF WBC: CPT

## 2019-05-29 PROCEDURE — 82150 ASSAY OF AMYLASE: CPT

## 2019-05-29 PROCEDURE — 87075 CULTR BACTERIA EXCEPT BLOOD: CPT

## 2019-05-29 PROCEDURE — 99233 PR SUBSEQUENT HOSPITAL CARE,LEVL III: ICD-10-PCS | Mod: ,,, | Performed by: INTERNAL MEDICINE

## 2019-05-29 PROCEDURE — 25000003 PHARM REV CODE 250: Performed by: HOSPITALIST

## 2019-05-29 PROCEDURE — 80069 RENAL FUNCTION PANEL: CPT

## 2019-05-29 PROCEDURE — 25000003 PHARM REV CODE 250: Performed by: PHYSICIAN ASSISTANT

## 2019-05-29 PROCEDURE — 63600175 PHARM REV CODE 636 W HCPCS: Performed by: HOSPITALIST

## 2019-05-29 PROCEDURE — 84157 ASSAY OF PROTEIN OTHER: CPT

## 2019-05-29 PROCEDURE — 99238 HOSP IP/OBS DSCHRG MGMT 30/<: CPT | Mod: ,,, | Performed by: HOSPITALIST

## 2019-05-29 PROCEDURE — 36415 COLL VENOUS BLD VENIPUNCTURE: CPT

## 2019-05-29 PROCEDURE — 87070 CULTURE OTHR SPECIMN AEROBIC: CPT

## 2019-05-29 PROCEDURE — 83735 ASSAY OF MAGNESIUM: CPT

## 2019-05-29 PROCEDURE — 89051 BODY FLUID CELL COUNT: CPT

## 2019-05-29 PROCEDURE — G0378 HOSPITAL OBSERVATION PER HR: HCPCS

## 2019-05-29 PROCEDURE — 99233 SBSQ HOSP IP/OBS HIGH 50: CPT | Mod: ,,, | Performed by: INTERNAL MEDICINE

## 2019-05-29 RX ORDER — METOPROLOL SUCCINATE 50 MG/1
50 TABLET, EXTENDED RELEASE ORAL DAILY
Qty: 90 TABLET | Refills: 3 | Status: ON HOLD | OUTPATIENT
Start: 2019-05-29 | End: 2019-07-15 | Stop reason: HOSPADM

## 2019-05-29 RX ORDER — POTASSIUM CHLORIDE 20 MEQ/15ML
40 SOLUTION ORAL ONCE
Status: COMPLETED | OUTPATIENT
Start: 2019-05-29 | End: 2019-05-29

## 2019-05-29 RX ORDER — FUROSEMIDE 80 MG/1
80 TABLET ORAL 2 TIMES DAILY
Qty: 60 TABLET | Refills: 11 | Status: ON HOLD | OUTPATIENT
Start: 2019-05-29 | End: 2019-07-15 | Stop reason: HOSPADM

## 2019-05-29 RX ADMIN — CLOPIDOGREL 75 MG: 75 TABLET, FILM COATED ORAL at 09:05

## 2019-05-29 RX ADMIN — POLYETHYLENE GLYCOL 3350 17 G: 17 POWDER, FOR SOLUTION ORAL at 09:05

## 2019-05-29 RX ADMIN — ONDANSETRON 8 MG: 8 TABLET, ORALLY DISINTEGRATING ORAL at 05:05

## 2019-05-29 RX ADMIN — VITAMIN D, TAB 1000IU (100/BT) 1000 UNITS: 25 TAB at 09:05

## 2019-05-29 RX ADMIN — FUROSEMIDE 60 MG: 10 INJECTION, SOLUTION INTRAMUSCULAR; INTRAVENOUS at 09:05

## 2019-05-29 RX ADMIN — INSULIN ASPART 3 UNITS: 100 INJECTION, SOLUTION INTRAVENOUS; SUBCUTANEOUS at 09:05

## 2019-05-29 RX ADMIN — PANTOPRAZOLE SODIUM 40 MG: 40 TABLET, DELAYED RELEASE ORAL at 09:05

## 2019-05-29 RX ADMIN — ONDANSETRON 8 MG: 8 TABLET, ORALLY DISINTEGRATING ORAL at 01:05

## 2019-05-29 RX ADMIN — POTASSIUM CHLORIDE 40 MEQ: 20 SOLUTION ORAL at 01:05

## 2019-05-29 RX ADMIN — MIDODRINE HYDROCHLORIDE 5 MG: 5 TABLET ORAL at 09:05

## 2019-05-29 RX ADMIN — APIXABAN 5 MG: 5 TABLET, FILM COATED ORAL at 09:05

## 2019-05-29 RX ADMIN — ATORVASTATIN CALCIUM 40 MG: 20 TABLET, FILM COATED ORAL at 09:05

## 2019-05-29 RX ADMIN — PANCRELIPASE 2 CAPSULE: 30000; 6000; 19000 CAPSULE, DELAYED RELEASE PELLETS ORAL at 01:05

## 2019-05-29 RX ADMIN — MIDODRINE HYDROCHLORIDE 5 MG: 5 TABLET ORAL at 03:05

## 2019-05-29 RX ADMIN — TAMSULOSIN HYDROCHLORIDE 0.4 MG: 0.4 CAPSULE ORAL at 09:05

## 2019-05-29 RX ADMIN — METOPROLOL SUCCINATE 50 MG: 50 TABLET, EXTENDED RELEASE ORAL at 09:05

## 2019-05-29 RX ADMIN — PANCRELIPASE 2 CAPSULE: 30000; 6000; 19000 CAPSULE, DELAYED RELEASE PELLETS ORAL at 09:05

## 2019-05-29 NOTE — PLAN OF CARE
05/29/19 1601   Final Note   Assessment Type Final Discharge Note   Anticipated Discharge Disposition Home   What phone number can be called within the next 1-3 days to see how you are doing after discharge? [phone]   Hospital Follow Up  Appt(s) scheduled? Yes   Discharge plans and expectations educations in teach back method with documentation complete? Yes   Right Care Referral Info   Post Acute Recommendation No Care     Future Appointments   Date Time Provider Department Center   5/30/2019  4:30 PM Jaime Carney III, MD [facility] CARD[facility] [place]   8/2/2019 11:20 AM Leon Barajas DO [facility] IM [place]

## 2019-05-29 NOTE — ASSESSMENT & PLAN NOTE
Patient with abnormal Cr dating back to 3/12 with acute worsening of renal function on 3/27. Of note, patient was discharge on 3/21 the day after he received 125ml of contrast load in his angiogram. The next metabolic panel on 3/27 displayed his worsening renal function. Patient has multiple risk factors for CKD with HTN, CAD/CHF, Obesity, DM and risk factors for acute worsening with fluid shifts related to repeat paracenteses in the setting of likely prior JENNY. Patient presented on 5/22 with Cr around his new baseline at Cr 2.3 and improved overnight with administration of lasix 60mg IV once before next lab draw and pRBC.     Patient lying flat, speaking in full sentences, has significant peripheral lymphedema (chronic) but does not appear to be grossly overloaded with pulmonary edema on CXR and no rales on examination    Plan:  Suggest Lasix 80mg PO BID on discharge.   Daily CMP, Mag, Phos while IP  Will need close follow up (metabolic panel in 1 week following discharge with Mag)   Replace K+  Needs nephrology follow up   Strict I's and O's  Daily weights  Avoid NSAIDS and other nephrotoxins  Renally dose all medications  US RP displayed decreased perfusion bilaterally.  Otherwise the kidneys appear unremarkable without evidence for hydronephrosis. Ascites.

## 2019-05-29 NOTE — PROGRESS NOTES
[facility]  Nephrology  Progress Note    Patient Name: Jian Arrieta  MRN: [id number]  Admission Date: 5/22/2019  Hospital Length of Stay: 7 days  Attending Provider: Roly Caba MD   Primary Care Physician: Leon Barajas DO  Principal Problem:Symptomatic anemia    Subjective:     HPI: Mr. Jian Arrieta is a 63 year old male with CAD (s/p PCI with 5 stents) with subsequent CABG x 3v (2017 at ), chronic combined systolic and diastolic heart failure, HLD, DM2 on insulin, BERHANE on CPAP, chronic pancreatitis with pseudocyst being admitted to observation for shortness of breath. Patient had a paracentesis and lower extremity US 5/23. He endorses SOB and intermittent b/l leg swelling x several months. He denies fever, chest pain, abdominal pain, vomiting, melena, or confusion. He is on eliquis.Patient states he feels like he has gained over 25 pounds of fluid.  Feels frustrated that he has not seen a nephrologist during last admission.  Had a paracentesis 5/23 with 3.6L removed.   Nephrology was consulted out of concern for ELIEZER on CKDIII and volume overload.     Of note, patient was hospitalized in March 2019 at which point he was evaluated for cirrhosis. US liver displayed cirrhosis and concern for portal hypertension. He underwent 3 large volume paracentesis during this hospitalization and underwent liver biopsy with transjugular pressure gradient was 2mm hg and biopsy was not consistent with cirrhosis. Additionally, ascitic fluid studies have been consistent with high SAAG and high protein more in line with heart failure.     Interval history: Cr 2.6 to 2.4 to 2.6. Patient feels symptomatically better with wound care's treatment of legs but is adamant about leaving Will continue IV lasix while in hospital. Patient needs electrolyte replacement.     Review of patient's allergies indicates:  No Known Allergies  Current Facility-Administered Medications   Medication Frequency    0.9%  NaCl  infusion (for blood administration) Q24H PRN    acetaminophen tablet 650 mg Q4H PRN    albuterol-ipratropium 2.5 mg-0.5 mg/3 mL nebulizer solution 3 mL Q4H PRN    apixaban tablet 5 mg BID    atorvastatin tablet 40 mg Daily    bisacodyl suppository 10 mg Daily PRN    clopidogrel tablet 75 mg Daily    dextrose 50% injection 12.5 g PRN    dextrose 50% injection 25 g PRN    furosemide injection 60 mg BID    glucagon (human recombinant) injection 1 mg PRN    glucose chewable tablet 16 g PRN    glucose chewable tablet 24 g PRN    insulin aspart U-100 pen 0-5 Units QID (AC + HS) PRN    lipase-protease-amylase 6,000-19,000-30,000 units per capsule 2 capsule TID WM    metoprolol succinate (TOPROL-XL) 24 hr tablet 50 mg Daily    midodrine tablet 5 mg TID    ondansetron disintegrating tablet 8 mg Q8H PRN    pantoprazole EC tablet 40 mg Daily    polyethylene glycol packet 17 g Daily    promethazine (PHENERGAN) 6.25 mg in dextrose 5 % 50 mL IVPB Q6H PRN    ramelteon tablet 8 mg Nightly PRN    sodium chloride 0.9% flush 10 mL PRN    sodium chloride 0.9% flush 10 mL PRN    tamsulosin 24 hr capsule 0.4 mg Daily    vitamin D 1000 units tablet 1,000 Units Daily     Review of Systems   Constitutional: Positive for fatigue. Negative for chills and fever.   HENT: Negative.  Negative for postnasal drip and sore throat.    Eyes: Negative.    Respiratory: Positive for shortness of breath. Negative for chest tightness.    Cardiovascular: Positive for palpitations and leg swelling. Negative for chest pain.   Gastrointestinal: Positive for abdominal distention. Negative for abdominal pain, blood in stool, constipation and nausea.   Endocrine: Negative.    Genitourinary: Negative for dysuria.   Musculoskeletal: Negative for arthralgias, back pain and joint swelling.   Skin: Negative.    Allergic/Immunologic: Negative.    Neurological: Negative.  Negative for light-headedness and headaches.   Hematological: Negative.     Psychiatric/Behavioral: Negative for confusion and dysphoric mood. The patient is not nervous/anxious and is not hyperactive.      Objective:     Vital Signs (Most Recent):  Temp: 98.1 °F (36.7 °C) (05/29/19 0815)  Pulse: 93 (05/29/19 1200)  Resp: 14 (05/29/19 0815)  BP: 101/64 (05/29/19 0815)  SpO2: 100 % (05/29/19 0815)  O2 Device (Oxygen Therapy): room air (05/29/19 0815) Vital Signs (24h Range):  Temp:  [97.2 °F (36.2 °C)-98.7 °F (37.1 °C)] 98.1 °F (36.7 °C)  Pulse:  [78-93] 93  Resp:  [14-20] 14  SpO2:  [98 %-100 %] 100 %  BP: ()/(50-64) 101/64     Weight: 123.4 kg (272 lb 0.8 oz) (05/29/19 0555)  Body mass index is 42.61 kg/m².  Body surface area is 2.42 meters squared.    I/O last 3 completed shifts:  In: 240 [P.O.:240]  Out: 2075 [Urine:2075]    Physical Exam   Constitutional: He appears well-developed.  Non-toxic appearance. He appears ill. No distress.   Obese male  Lying flat in bed on room air - able to speak in full sentences  Bruises about BL UE, wrapped LE BL with swelling   PICC line R chest wall   HENT:   Head: Normocephalic and atraumatic.   Mouth/Throat: Abnormal dentition.   Eyes: Pupils are equal, round, and reactive to light.   Cardiovascular: Normal rate, S1 normal, S2 normal and intact distal pulses.   Murmur heard.  Pulses:       Radial pulses are 2+ on the right side, and 2+ on the left side.        Dorsalis pedis pulses are 2+ on the right side, and 2+ on the left side.   Pulmonary/Chest: Effort normal.   Abdominal: Soft. He exhibits no distension. There is no tenderness. There is no guarding.   Musculoskeletal: He exhibits edema (improving with proper wrapping, diuretics).   Lymphadenopathy:   Significant lymphedema BL LE   Neurological: He is alert. GCS eye subscore is 4. GCS verbal subscore is 5. GCS motor subscore is 6.   Skin: Skin is warm. There is pallor.   Nursing note and vitals reviewed.      Significant Labs:  All labs within the past 24 hours have been  reviewed.    Significant Imaging:  Labs: Reviewed  ECG: Reviewed  Echo: Reviewed    Assessment/Plan:     Stage 3 chronic kidney disease  Patient with abnormal Cr dating back to 3/12 with acute worsening of renal function on 3/27. Of note, patient was discharge on 3/21 the day after he received 125ml of contrast load in his angiogram. The next metabolic panel on 3/27 displayed his worsening renal function. Patient has multiple risk factors for CKD with HTN, CAD/CHF, Obesity, DM and risk factors for acute worsening with fluid shifts related to repeat paracenteses in the setting of likely prior JENNY. Patient presented on 5/22 with Cr around his new baseline at Cr 2.3 and improved overnight with administration of lasix 60mg IV once before next lab draw and pRBC.     Patient lying flat, speaking in full sentences, has significant peripheral lymphedema (chronic) but does not appear to be grossly overloaded with pulmonary edema on CXR and no rales on examination    Plan:  Suggest Lasix 80mg PO BID on discharge.   Daily CMP, Mag, Phos while IP  Will need close follow up (metabolic panel in 1 week following discharge with Mag)   Replace K+  Needs nephrology follow up   Strict I's and O's  Daily weights  Avoid NSAIDS and other nephrotoxins  Renally dose all medications  US RP displayed decreased perfusion bilaterally.  Otherwise the kidneys appear unremarkable without evidence for hydronephrosis. Ascites.        Thank you for your consult. I will follow-up with patient. Please contact us if you have any additional questions.    Leon Boston MD  Nephrology  [facility]

## 2019-05-29 NOTE — SUBJECTIVE & OBJECTIVE
Interval history: Cr 2.6 to 2.4 to 2.6. Patient feels symptomatically better with wound care's treatment of legs but is adamant about leaving Will continue IV lasix while in hospital. Patient needs electrolyte replacement.     Review of patient's allergies indicates:  No Known Allergies  Current Facility-Administered Medications   Medication Frequency    0.9%  NaCl infusion (for blood administration) Q24H PRN    acetaminophen tablet 650 mg Q4H PRN    albuterol-ipratropium 2.5 mg-0.5 mg/3 mL nebulizer solution 3 mL Q4H PRN    apixaban tablet 5 mg BID    atorvastatin tablet 40 mg Daily    bisacodyl suppository 10 mg Daily PRN    clopidogrel tablet 75 mg Daily    dextrose 50% injection 12.5 g PRN    dextrose 50% injection 25 g PRN    furosemide injection 60 mg BID    glucagon (human recombinant) injection 1 mg PRN    glucose chewable tablet 16 g PRN    glucose chewable tablet 24 g PRN    insulin aspart U-100 pen 0-5 Units QID (AC + HS) PRN    lipase-protease-amylase 6,000-19,000-30,000 units per capsule 2 capsule TID WM    metoprolol succinate (TOPROL-XL) 24 hr tablet 50 mg Daily    midodrine tablet 5 mg TID    ondansetron disintegrating tablet 8 mg Q8H PRN    pantoprazole EC tablet 40 mg Daily    polyethylene glycol packet 17 g Daily    promethazine (PHENERGAN) 6.25 mg in dextrose 5 % 50 mL IVPB Q6H PRN    ramelteon tablet 8 mg Nightly PRN    sodium chloride 0.9% flush 10 mL PRN    sodium chloride 0.9% flush 10 mL PRN    tamsulosin 24 hr capsule 0.4 mg Daily    vitamin D 1000 units tablet 1,000 Units Daily     Review of Systems   Constitutional: Positive for fatigue. Negative for chills and fever.   HENT: Negative.  Negative for postnasal drip and sore throat.    Eyes: Negative.    Respiratory: Positive for shortness of breath. Negative for chest tightness.    Cardiovascular: Positive for palpitations and leg swelling. Negative for chest pain.   Gastrointestinal: Positive for abdominal  distention. Negative for abdominal pain, blood in stool, constipation and nausea.   Endocrine: Negative.    Genitourinary: Negative for dysuria.   Musculoskeletal: Negative for arthralgias, back pain and joint swelling.   Skin: Negative.    Allergic/Immunologic: Negative.    Neurological: Negative.  Negative for light-headedness and headaches.   Hematological: Negative.    Psychiatric/Behavioral: Negative for confusion and dysphoric mood. The patient is not nervous/anxious and is not hyperactive.      Objective:     Vital Signs (Most Recent):  Temp: 98.1 °F (36.7 °C) (05/29/19 0815)  Pulse: 93 (05/29/19 1200)  Resp: 14 (05/29/19 0815)  BP: 101/64 (05/29/19 0815)  SpO2: 100 % (05/29/19 0815)  O2 Device (Oxygen Therapy): room air (05/29/19 0815) Vital Signs (24h Range):  Temp:  [97.2 °F (36.2 °C)-98.7 °F (37.1 °C)] 98.1 °F (36.7 °C)  Pulse:  [78-93] 93  Resp:  [14-20] 14  SpO2:  [98 %-100 %] 100 %  BP: ()/(50-64) 101/64     Weight: 123.4 kg (272 lb 0.8 oz) (05/29/19 0555)  Body mass index is 42.61 kg/m².  Body surface area is 2.42 meters squared.    I/O last 3 completed shifts:  In: 240 [P.O.:240]  Out: 2075 [Urine:2075]    Physical Exam   Constitutional: He appears well-developed.  Non-toxic appearance. He appears ill. No distress.   Obese male  Lying flat in bed on room air - able to speak in full sentences  Bruises about BL UE, wrapped LE BL with swelling   PICC line R chest wall   HENT:   Head: Normocephalic and atraumatic.   Mouth/Throat: Abnormal dentition.   Eyes: Pupils are equal, round, and reactive to light.   Cardiovascular: Normal rate, S1 normal, S2 normal and intact distal pulses.   Murmur heard.  Pulses:       Radial pulses are 2+ on the right side, and 2+ on the left side.        Dorsalis pedis pulses are 2+ on the right side, and 2+ on the left side.   Pulmonary/Chest: Effort normal.   Abdominal: Soft. He exhibits no distension. There is no tenderness. There is no guarding.   Musculoskeletal: He  exhibits edema (improving with proper wrapping, diuretics).   Lymphadenopathy:   Significant lymphedema BL LE   Neurological: He is alert. GCS eye subscore is 4. GCS verbal subscore is 5. GCS motor subscore is 6.   Skin: Skin is warm. There is pallor.   Nursing note and vitals reviewed.      Significant Labs:  All labs within the past 24 hours have been reviewed.    Significant Imaging:  Labs: Reviewed  ECG: Reviewed  Echo: Reviewed

## 2019-05-29 NOTE — PROGRESS NOTES
Complain of feeling Nauseated. Too soon to administer zofran,  state that Med is not treating nausea. Also complain of needing to have BM.  I asked if I need to call MD stated yes. On call MD informed about these complaints. Meds was taken into room upon arrival of phenergan to floor from pharmacy. Pt refused to take both . Stated I want to wait a little longer before taking anything

## 2019-05-29 NOTE — PLAN OF CARE
Problem: Adult Inpatient Plan of Care  Goal: Plan of Care Review  Slept part of this shift. Complain of having nausea earlier this shift but refuse to take med to treat. This morning again complain and took oral zofran, Had 2 Bm this shift. No other complain voiced

## 2019-05-29 NOTE — NURSING
D/C to home with tunneled catheter in place.  Instructed on care.  Returned demonstration.  Dietitian not available for diet eduction material.  Patient given [employer] contact information for referral.  D/C instructions reviewed, questions answered and copy of instructions given to patient who verbalized understanding of instructions.  Left unit in w/c with friend.

## 2019-05-29 NOTE — PLAN OF CARE
[facility]    HOME HEALTH ORDERS  FACE TO FACE ENCOUNTER    Patient Name: Jian Arrieat  YOB: 1955    PCP: Leon Barajas DO   PCP Address: [address] / [place] [address]  PCP Phone Number: [phone]  PCP Fax: [phone]    Encounter Date: 05/29/2019    Admit to Home Health    Diagnoses:  Active Hospital Problems    Diagnosis  POA    *Symptomatic anemia [D64.9]  Yes    Alteration in skin integrity [R23.9]  Yes    Venous stasis dermatitis of both lower extremities [I87.2]  Yes    Stage 3 chronic kidney disease [N18.3]  Yes    Acute renal failure with specified lesion [N17.8]  Yes    Hypertension associated with diabetes [E11.59, I10]  Yes    Paroxysmal atrial fibrillation [I48.0]  Yes    Ascites [R18.8]  Yes    DM type 2 with diabetic peripheral neuropathy [E11.42]  Yes    Hypoalbuminemia [E88.09]  Yes    Acute on chronic combined systolic and diastolic heart failure [I50.43]  Yes     1-28-19  Left Ventricle Moderate decreased ejection fraction at 30%. Normal left ventricular cavity size. Normal wall thickness observed. Grade I (mild) left ventricular diastolic dysfunction consistent with impaired relaxation. Normal left atrial pressure. Moderate, global hypokinesis(see wall scoring diagram).   Right Ventricle Normal cavity size, wall thickness and ejection fraction. Wall motion normal.   Left Atrium Normal atrial size. .   Right Atrium Normal atrial size.   Aortic Valve The valve is trileaflet. Aortic valve sclerosis is mild. No stenosis. No regurgitation. Mobility is normal   Mitral Valve Normal valve structure. No stenosis. No regurgitation. Normal leaflet mobility.   Tricuspid Valve Tricuspid valve not well visualized. Trace regurgitation.   Pulmonic Valve The pulmonic valve was not well visualized.   IVC/SVC Inferior vena cava is not well visualized.   Ascending Aorta The aortic root and ascending aorta are normal in size.   Pericardium  No pericardial effusion.           Anasarca [R60.1]  Yes    Chronic pancreatitis [K86.1]  Yes    Morbid obesity with BMI of 50.0-59.9, adult [E66.01, Z68.43]  Not Applicable      Resolved Hospital Problems   No resolved problems to display.       Future Appointments   Date Time Provider Department Center   5/30/2019  4:30 PM Jaime Carney III, MD [facility] CARDIO [place]   8/2/2019 11:20 AM Leon Barajas DO [facility] IM [place]           I have seen and examined this patient face to face today. My clinical findings that support the need for the home health skilled services and home bound status are the following:  Weakness/numbness causing balance and gait disturbance due to Weakness/Debility making it taxing to leave home.    Allergies:Review of patient's allergies indicates:  No Known Allergies    Diet: renal diet and 2 gram sodium diet    Activities: activity as tolerated    Nursing:   SN to complete comprehensive assessment including routine vital signs. Instruct on disease process and s/s of complications to report to MD. Review/verify medication list sent home with the patient at time of discharge  and instruct patient/caregiver as needed. Frequency may be adjusted depending on start of care date.    Notify MD if SBP > 160 or < 90; DBP > 90 or < 50; HR > 120 or < 50; Temp > 101; Other:         CONSULTS:    Physical Therapy to evaluate and treat. Evaluate for home safety and equipment needs; Establish/upgrade home exercise program. Perform / instruct on therapeutic exercises, gait training, transfer training, and Range of Motion.  Occupational Therapy to evaluate and treat. Evaluate home environment for safety and equipment needs. Perform/Instruct on transfers, ADL training, ROM, and therapeutic exercises.    MISCELLANEOUS CARE:  Diabetic Care:   Fingerstick blood sugar a.m. and p.m.    WOUND CARE ORDERS  n/a      Medications: Review discharge medications with patient and family and provide education.       Current Discharge Medication List      CONTINUE these medications which have CHANGED    Details   furosemide (LASIX) 80 MG tablet Take 1 tablet (80 mg total) by mouth 2 (two) times daily.  Qty: 60 tablet, Refills: 11      metoprolol succinate (TOPROL-XL) 50 MG 24 hr tablet Take 1 tablet (50 mg total) by mouth once daily.  Qty: 90 tablet, Refills: 3    Associated Diagnoses: Chronic combined systolic and diastolic heart failure         CONTINUE these medications which have NOT CHANGED    Details   apixaban (ELIQUIS) 5 mg Tab Take 1 tablet (5 mg total) by mouth 2 (two) times daily.  Qty: 90 tablet, Refills: 3      atorvastatin (LIPITOR) 40 MG tablet Take 1 tablet (40 mg total) by mouth once daily.  Qty: 90 tablet, Refills: 3    Associated Diagnoses: Mixed hyperlipidemia      clopidogrel (PLAVIX) 75 mg tablet Take 1 tablet (75 mg total) by mouth once daily.  Qty: 30 tablet, Refills: 11      insulin aspart U-100 (NOVOLOG) 100 unit/mL injection Inject 4 Units into the skin 3 (three) times daily before meals.      insulin detemir (LEVEMIR FLEXPEN SUBQ) Inject 12 Units into the skin once daily.       lipase-protease-amylase (CREON) 36,000-114,000- 180,000 unit CpDR Take 1 capsule by mouth 3 (three) times daily.  Qty: 270 capsule, Refills: 3    Associated Diagnoses: Alcohol-induced chronic pancreatitis      omeprazole (PRILOSEC) 40 MG capsule Take 40 mg by mouth once daily.  Refills: 8      tamsulosin (FLOMAX) 0.4 mg Cap Take 1 capsule by mouth once daily. Pt has not started taking as of 2/27/19.  Refills: 0      cyanocobalamin, vitamin B-12, 500 mcg Subl Place 1 tablet under the tongue every Monday.       ONETOUCH ULTRA TEST Strp 4 (four) times daily. Use to test blood sugar four times a day.  Refills: 3      vitamin D (VITAMIN D3) 1000 units Tab Take 1 tablet (1,000 Units total) by mouth once daily.             I certify that this patient is confined to his home and needs intermittent skilled nursing care, physical  therapy and occupational therapy.

## 2019-05-29 NOTE — PROGRESS NOTES
2.6L removed from right abdomen, pt tolerates well.  DSD applied. Report called to Joshua MARIO. Awaits transport.

## 2019-05-30 ENCOUNTER — OFFICE VISIT (OUTPATIENT)
Dept: CARDIOLOGY | Facility: CLINIC | Age: 64
End: 2019-05-30
Payer: COMMERCIAL

## 2019-05-30 ENCOUNTER — TELEPHONE (OUTPATIENT)
Dept: NEPHROLOGY | Facility: CLINIC | Age: 64
End: 2019-05-30

## 2019-05-30 VITALS
WEIGHT: 268.31 LBS | BODY MASS INDEX: 42.11 KG/M2 | HEIGHT: 67 IN | DIASTOLIC BLOOD PRESSURE: 58 MMHG | HEART RATE: 101 BPM | SYSTOLIC BLOOD PRESSURE: 99 MMHG

## 2019-05-30 DIAGNOSIS — E11.59 HYPERTENSION ASSOCIATED WITH DIABETES: ICD-10-CM

## 2019-05-30 DIAGNOSIS — E11.69 HYPERLIPIDEMIA ASSOCIATED WITH TYPE 2 DIABETES MELLITUS: ICD-10-CM

## 2019-05-30 DIAGNOSIS — I87.2 VENOUS STASIS DERMATITIS OF BOTH LOWER EXTREMITIES: Primary | ICD-10-CM

## 2019-05-30 DIAGNOSIS — E78.5 HYPERLIPIDEMIA ASSOCIATED WITH TYPE 2 DIABETES MELLITUS: ICD-10-CM

## 2019-05-30 DIAGNOSIS — I48.0 PAROXYSMAL ATRIAL FIBRILLATION: ICD-10-CM

## 2019-05-30 DIAGNOSIS — I15.2 HYPERTENSION ASSOCIATED WITH DIABETES: ICD-10-CM

## 2019-05-30 DIAGNOSIS — I25.10 CORONARY ARTERY DISEASE DUE TO CALCIFIED CORONARY LESION: ICD-10-CM

## 2019-05-30 DIAGNOSIS — I25.84 CORONARY ARTERY DISEASE DUE TO CALCIFIED CORONARY LESION: ICD-10-CM

## 2019-05-30 LAB — POCT GLUCOSE: 264 MG/DL (ref 70–110)

## 2019-05-30 PROCEDURE — 3078F PR MOST RECENT DIASTOLIC BLOOD PRESSURE < 80 MM HG: ICD-10-PCS | Mod: CPTII,S$GLB,, | Performed by: INTERNAL MEDICINE

## 2019-05-30 PROCEDURE — 3074F PR MOST RECENT SYSTOLIC BLOOD PRESSURE < 130 MM HG: ICD-10-PCS | Mod: CPTII,S$GLB,, | Performed by: INTERNAL MEDICINE

## 2019-05-30 PROCEDURE — 99499 RISK ADDL DX/OHS AUDIT: ICD-10-PCS | Mod: S$GLB,,, | Performed by: INTERNAL MEDICINE

## 2019-05-30 PROCEDURE — 3008F BODY MASS INDEX DOCD: CPT | Mod: CPTII,S$GLB,, | Performed by: INTERNAL MEDICINE

## 2019-05-30 PROCEDURE — 3008F PR BODY MASS INDEX (BMI) DOCUMENTED: ICD-10-PCS | Mod: CPTII,S$GLB,, | Performed by: INTERNAL MEDICINE

## 2019-05-30 PROCEDURE — 99499 UNLISTED E&M SERVICE: CPT | Mod: S$GLB,,, | Performed by: INTERNAL MEDICINE

## 2019-05-30 PROCEDURE — 3045F PR MOST RECENT HEMOGLOBIN A1C LEVEL 7.0-9.0%: ICD-10-PCS | Mod: CPTII,S$GLB,, | Performed by: INTERNAL MEDICINE

## 2019-05-30 PROCEDURE — 3045F PR MOST RECENT HEMOGLOBIN A1C LEVEL 7.0-9.0%: CPT | Mod: CPTII,S$GLB,, | Performed by: INTERNAL MEDICINE

## 2019-05-30 PROCEDURE — 3078F DIAST BP <80 MM HG: CPT | Mod: CPTII,S$GLB,, | Performed by: INTERNAL MEDICINE

## 2019-05-30 PROCEDURE — 3074F SYST BP LT 130 MM HG: CPT | Mod: CPTII,S$GLB,, | Performed by: INTERNAL MEDICINE

## 2019-05-30 PROCEDURE — 99999 PR PBB SHADOW E&M-EST. PATIENT-LVL III: ICD-10-PCS | Mod: PBBFAC,,, | Performed by: INTERNAL MEDICINE

## 2019-05-30 PROCEDURE — 99214 OFFICE O/P EST MOD 30 MIN: CPT | Mod: S$GLB,,, | Performed by: INTERNAL MEDICINE

## 2019-05-30 PROCEDURE — 99214 PR OFFICE/OUTPT VISIT, EST, LEVL IV, 30-39 MIN: ICD-10-PCS | Mod: S$GLB,,, | Performed by: INTERNAL MEDICINE

## 2019-05-30 PROCEDURE — 99999 PR PBB SHADOW E&M-EST. PATIENT-LVL III: CPT | Mod: PBBFAC,,, | Performed by: INTERNAL MEDICINE

## 2019-05-30 NOTE — PROCEDURES
Radiology Post-Procedure Note    Pre Op Diagnosis: Ascites  Post Op Diagnosis: Same    Procedure: Ultrasound Guided Paracentesis    Procedure performed by: Annie.    Written Informed Consent Obtained: Yes  Specimen Removed: YES   Estimated Blood Loss: Minimal    Findings:   Successful paracentesis with 2.6 L removed.  Albumin administered PRN per protocol.    Patient tolerated procedure well.    Roberto Millard MD  PGY-II Radiology Resident  [address] [facility]  Pager: [phone]

## 2019-05-30 NOTE — DISCHARGE SUMMARY
Discharge Summary  Hospital Medicine    Attending Provider on Discharge: Roly Caba MD  [facility] Medicine Team: [facility] HOSP MED R  Date of Admission:  5/22/2019     Date of Discharge:  5/29/2019  Length of Stay:  LOS: 7 days   Code status: Full Code    Active Hospital Problems    Diagnosis  POA    *Symptomatic anemia [D64.9]  Yes    Alteration in skin integrity [R23.9]  Yes    Venous stasis dermatitis of both lower extremities [I87.2]  Yes    Stage 3 chronic kidney disease [N18.3]  Yes    Acute renal failure with specified lesion [N17.8]  Yes    Hypertension associated with diabetes [E11.59, I10]  Yes    Paroxysmal atrial fibrillation [I48.0]  Yes    Ascites [R18.8]  Yes    DM type 2 with diabetic peripheral neuropathy [E11.42]  Yes    Hypoalbuminemia [E88.09]  Yes    Acute on chronic combined systolic and diastolic heart failure [I50.43]  Yes     1-28-19  Left Ventricle Moderate decreased ejection fraction at 30%. Normal left ventricular cavity size. Normal wall thickness observed. Grade I (mild) left ventricular diastolic dysfunction consistent with impaired relaxation. Normal left atrial pressure. Moderate, global hypokinesis(see wall scoring diagram).   Right Ventricle Normal cavity size, wall thickness and ejection fraction. Wall motion normal.   Left Atrium Normal atrial size. .   Right Atrium Normal atrial size.   Aortic Valve The valve is trileaflet. Aortic valve sclerosis is mild. No stenosis. No regurgitation. Mobility is normal   Mitral Valve Normal valve structure. No stenosis. No regurgitation. Normal leaflet mobility.   Tricuspid Valve Tricuspid valve not well visualized. Trace regurgitation.   Pulmonic Valve The pulmonic valve was not well visualized.   IVC/SVC Inferior vena cava is not well visualized.   Ascending Aorta The aortic root and ascending aorta are normal in size.   Pericardium No pericardial effusion.           Anasarca [R60.1]  Yes    Chronic pancreatitis [K86.1]  Yes     Morbid obesity with BMI of 50.0-59.9, adult [E66.01, Z68.43]  Not Applicable      Resolved Hospital Problems   No resolved problems to display.        HPI    63 y.o. male with co-morbidities including alcohol abuse, type 2 diabetes, former smoker, chronic combined systolic and diastolic heart failure, CAD, chronic pancreatitis, morbid obesity, CABG who presents to the ED with a complaint of SOB. Patient had a paracentesis and lower extremity US today. He endorses SOB and intermittent b/l leg swelling x several months. He denies fever, chest pain, abdominal pain, vomiting, melena, or confusion. He is on eliquis.     Patient states he feels like he has gained over 25 pounds of fluid.  Feels frustrated that he has not seen a nephrologist on las admission.  Had a paracentesis today with 3L removed.      Hospital Course    # Symptomatic Anemia  - no evidence of GI bleed. Previous iron panel in march indicative of AoCD.   - transfused 1 unit of PRBC  - Hgb stable   - Nutrition consult placed.      # Acute on chronic systolic and diastolic heart failure  - TTE ordered, showed normal EF and diastolic function  - lasix 60 mg IV transitioned to Oral Torsemide BID as per nephrology   - daily weights  - strict I/Os     # Ascites  - The etiology of this patient's significant ascites remains elusive. He has seen both hepatology and cardiology. A liver biopsy was -ve for clinically significant cirrhosis and portal hypertension. A 2D echo during this evaluation did not show any significant abnormalities, though it should be noted that previous studies have identified both diastolic and systolic dysfunction. He has a history of CAD with multiple stents placed in the past which make CM likely. He is on a beta blocker and diuretic, but his BP is making it difficult to adequately titrate these to maximal effect.   - h/o multiple paracenteses, most recently 5/24 (-2L)  - other potential etiology includes chronic pancreatitis (leak?) vs  peritoneal carcinomatosis though fluid analysis does not really reflect this. Previous amylase x2 wnl. Cytology did not show malignant cells.   - will need recurrent IR labs on a q weekly or q bimonthly basis.   - will need serial IR paracenteses ordered upon discharge. Patient requesting Mon/Fri.   - IR Paracentesis tomorrow      # Essential HTN   - hypotensive throughout hospitalization. Metoprolol titrated down; continue at 50 mg. Discontinuing torsemide, and restarting lasix, per nephrology's recommendations.      # DM2 with HTN and neuropathy and CKD stage 3  - Hba1c 7.1  - SSI; on home insulin      # Paroxysmal atrial fibrillation  - cont toprol and eliquis  - toprol titrated down due to persistent hypotension, weakness and dizziness.     # Morbid Obesity  Body mass index is 44.95 kg/m².  Nutrition consult placed. Weight loss recommended.      #Hypoalbuminemia   - UA negative for protein  - Liver biopsy negative for cirrhosis   - might benefit from GI referral for EGD/Colonoscopy     # LE Venous stasis ulcers  - wound care consult hydrofera blue placed to left leg     # ELIEZER on CKD stage 3  - urine lytes and nephrology consult as per patient request; renal U/S showed no hydronephrosis   - nephrology consulted. Appreciate assistance in managing complex patient's volume status and CKD, now with Cr bump to 2.6     # Chronic pancreatitis  - cont creon  - past h/o alcohol abuse  - could explain recurrent ascites in the absense of cirrhosis        Recent Labs   Lab 05/25/19  0932 05/26/19  0119 05/29/19  0846   WBC 5.16 5.66 7.32   HGB 8.0* 8.0* 8.9*   HCT 25.4* 24.3* 27.3*    185 217     Recent Labs   Lab 05/25/19  0932  05/27/19  1031 05/28/19  0423 05/29/19  0846   *   < > 135* 136 135*   K 3.5   < > 3.3* 2.9* 3.1*      < > 105 106 103   CO2 21*   < > 23 23 24   BUN 42*   < > 41* 41* 40*   CREATININE 2.3*   < > 2.6* 2.4* 2.6*   *   < > 246* 165* 217*   CALCIUM 6.8*   < > 7.0* 7.0* 7.5*    MG 1.3*  --  1.5* 1.5* 1.6   PHOS 3.1  --   --  3.4 3.3    < > = values in this interval not displayed.     Recent Labs   Lab 05/24/19  0530 05/25/19  0932 05/28/19  0423 05/29/19  0846   ALKPHOS 121 106  --   --    ALT 25 25  --   --    AST 33 29  --   --    ALBUMIN 1.4* 1.6* 1.6* 1.7*   PROT 3.5* 3.4*  --   --    BILITOT 0.4 0.4  --   --    INR 1.2  --   --   --       No results for input(s): CPK, CPKMB, MB, TROPONINI in the last 72 hours.  No results for input(s): LACTATE in the last 72 hours.    Recent Labs   Lab 05/27/19  1154 05/27/19  2133 05/28/19  0836 05/28/19  1240 05/28/19  1628 05/28/19  2150   POCTGLUCOSE 261* 227* 182* 281* 294* 256*      Hemoglobin A1C   Date Value Ref Range Status   05/23/2019 7.1 (H) 4.0 - 5.6 % Final     Comment:     ADA Screening Guidelines:  5.7-6.4%  Consistent with prediabetes  >or=6.5%  Consistent with diabetes  High levels of fetal hemoglobin interfere with the HbA1C  assay. Heterozygous hemoglobin variants (HbS, HgC, etc)do  not significantly interfere with this assay.   However, presence of multiple variants may affect accuracy.     03/18/2019 7.4 (H) 4.0 - 5.6 % Final     Comment:     ADA Screening Guidelines:  5.7-6.4%  Consistent with prediabetes  >or=6.5%  Consistent with diabetes  High levels of fetal hemoglobin interfere with the HbA1C  assay. Heterozygous hemoglobin variants (HbS, HgC, etc)do  not significantly interfere with this assay.   However, presence of multiple variants may affect accuracy.     01/28/2019 6.9 (H) 4.0 - 5.6 % Final     Comment:     ADA Screening Guidelines:  5.7-6.4%  Consistent with prediabetes  >or=6.5%  Consistent with diabetes  High levels of fetal hemoglobin interfere with the HbA1C  assay. Heterozygous hemoglobin variants (HbS, HgC, etc)do  not significantly interfere with this assay.   However, presence of multiple variants may affect accuracy.         Procedures: IR paracentesis 2    Consultants: Interventional radiology,  Nephrology    Discharge Medication List as of 5/29/2019  4:54 PM      CONTINUE these medications which have CHANGED    Details   furosemide (LASIX) 80 MG tablet Take 1 tablet (80 mg total) by mouth 2 (two) times daily., Starting Wed 5/29/2019, Until Thu 5/28/2020, Normal      metoprolol succinate (TOPROL-XL) 50 MG 24 hr tablet Take 1 tablet (50 mg total) by mouth once daily., Starting Wed 5/29/2019, Until Thu 5/28/2020, Normal         CONTINUE these medications which have NOT CHANGED    Details   apixaban (ELIQUIS) 5 mg Tab Take 1 tablet (5 mg total) by mouth 2 (two) times daily., Starting Wed 3/20/2019, Normal      atorvastatin (LIPITOR) 40 MG tablet Take 1 tablet (40 mg total) by mouth once daily., Starting Mon 2/4/2019, Until Tue 2/4/2020, Normal      clopidogrel (PLAVIX) 75 mg tablet Take 1 tablet (75 mg total) by mouth once daily., Starting Thu 3/21/2019, Until Fri 3/20/2020, Normal      cyanocobalamin, vitamin B-12, 500 mcg Subl Place 1 tablet under the tongue every Monday. , Historical Med      insulin aspart U-100 (NOVOLOG) 100 unit/mL injection Inject 4 Units into the skin 3 (three) times daily before meals., Historical Med      insulin detemir (LEVEMIR FLEXPEN SUBQ) Inject 12 Units into the skin once daily. , Historical Med      lipase-protease-amylase (CREON) 36,000-114,000- 180,000 unit CpDR Take 1 capsule by mouth 3 (three) times daily., Starting Mon 2/4/2019, Normal      omeprazole (PRILOSEC) 40 MG capsule Take 40 mg by mouth once daily., Starting Mon 2/18/2019, Historical Med      ONETOUCH ULTRA TEST Strp 4 (four) times daily. Use to test blood sugar four times a day., Starting 10/15/2015, Until Discontinued, Historical Med      tamsulosin (FLOMAX) 0.4 mg Cap Take 1 capsule by mouth once daily. Pt has not started taking as of 2/27/19., Starting Tue 2/5/2019, Historical Med      vitamin D (VITAMIN D3) 1000 units Tab Take 1 tablet (1,000 Units total) by mouth once daily., Starting Thu 1/31/2019, OTC              Discharge Diet:renal diet and 2 gram sodium diet with Fluid restriction 1000 per day    Activity: activity as tolerated    Discharge Condition: Stable    Disposition: Home-Health Care Svc    Tests pending at the time of discharge: none      Time spent  on the discharge of the patient including review of hospital course with the patient. reviewing discharge medications and arranging follow-up care 35 minutes    Discharge examination Patient was seen and examined on the date of discharge and determined to be suitable for discharge.    Discharge plan   Follow up with nephrology and cardiology     Future Appointments   Date Time Provider Department Center   5/30/2019  4:30 PM Jaime Carney III, MD [facility] CARDIO [place]   8/2/2019 11:20 AM Leon Barajas DO [facility] IM [place]       Roly Sanabria MD  Staff Hospitalist  Department of Hospital Medicine  [facility]   [phone]

## 2019-05-30 NOTE — H&P
Radiology History & Physical      SUBJECTIVE:     Chief Complaint: Ascites.    History of Present Illness:  Jian Arrieta is a 63 y.o. male with ascites who presents for     Past Medical History:   Diagnosis Date    Alcohol abuse     Anasarca 1/28/2019    Arthropathy associated with neurological disorder 9/2/2015    Atherosclerosis     Charcot foot due to diabetes mellitus     Chronic combined systolic and diastolic heart failure 01/29/2019 1-28-19 Left VentricleModerate decreased ejection fraction at 30%. Normal left ventricular cavity size. Normal wall thickness observed. Grade I (mild) left ventricular diastolic dysfunction consistent with impaired relaxation. Normal left atrial pressure. Moderate, global hypokinesis(see wall scoring diagram). Right VentricleNormal cavity size, wall thickness and ejection fraction. Wall motion n    Chronic pancreatitis 1/28/2019    Colon polyps     approx 5 yrs ago    Coronary artery disease due to calcified coronary lesion 05/08/2015    5 stents on ASA      Diabetic polyneuropathy associated with type 2 diabetes mellitus 9/2/2015    Diverticulosis 1/28/2019    DM type 2 with diabetic peripheral neuropathy 2/4/2019    Essential hypertension 1/28/2019    Former smoker 8/26/2015    Healed ulcer of left foot on examination 6/20/2017    Hydrocele     approx 1.5 yrs ago    Hypoalbuminemia 2/4/2019    Lymphedema of both lower extremities 1/29/2019    Mixed hyperlipidemia 5/8/2015    Morbid obesity with BMI of 50.0-59.9, adult 5/8/2015    Onychomycosis of multiple toenails with type 2 diabetes mellitus and peripheral neuropathy 6/20/2017    Perianal cyst     approx 2 yrs ago    Pseudocyst of pancreas 1/28/2019 1-28-19 Liver has a cirrhotic morphology with no focal lesions.  Significant interval increase in ascites when compared to prior exam which may account for patient's abdominal distension.  Hypodense air-fluid collection along the body of the pancreas  which is slightly smaller when compared to prior CT.  Findings may relate to pancreatic necrosis with pancreatic pseudocysts with infected pseudocyst    Skin cancer     skin cancer    Sleep apnea 8/26/2015    Status post bariatric surgery 1/11/2016    Type 2 diabetes mellitus, with long-term current use of insulin 5/8/2015     Past Surgical History:   Procedure Laterality Date    ANGIOGRAM, CORONARY ARTERY Right 3/20/2019    Performed by [facility] at [facility] CATH LAB    ANGIOPLASTY      total x5 stents    Bypass graft study  3/20/2019    Performed by [facility]que [facility] at [facility] CATH LAB    COLONOSCOPY N/A 10/6/2015    Performed by Shekhar Richards MD at [facility] ENDO (2ND FLR)    CORONARY ARTERY BYPASS GRAFT  2017    x3    CYST REMOVAL      GASTRECTOMY      GASTRECTOMY-SLEEVE-LAPAROSCOPIC - 87998 N/A 12/22/2015    Performed by Micheal Villavicencio Jr., MD at [facility] OR 2ND FLR    KNEE ARTHROSCOPY      perianal surgery      perianal cyst removed    pleurx N/A 5/17/2019    Performed by [facility] Diagnostic Provider at [facility] OR 2ND FLR       Home Meds:   Prior to Admission medications    Medication Sig Start Date End Date Taking? Authorizing Provider   apixaban (ELIQUIS) 5 mg Tab Take 1 tablet (5 mg total) by mouth 2 (two) times daily. 3/20/19  Yes Lorie Higuera MD   atorvastatin (LIPITOR) 40 MG tablet Take 1 tablet (40 mg total) by mouth once daily. 2/4/19 2/4/20 Yes Alfonso Mahmood MD   clopidogrel (PLAVIX) 75 mg tablet Take 1 tablet (75 mg total) by mouth once daily. 3/21/19 3/20/20 Yes Lorie Higuera MD   insulin aspart U-100 (NOVOLOG) 100 unit/mL injection Inject 4 Units into the skin 3 (three) times daily before meals.   Yes Historical Provider, MD   insulin detemir (LEVEMIR FLEXPEN SUBQ) Inject 12 Units into the skin once daily.    Yes Historical Provider, MD   lipase-protease-amylase (CREON) 36,000-114,000- 180,000 unit CpDR Take 1 capsule by mouth 3 (three) times daily. 2/4/19  Yes Alfonso Mahmood  MD   omeprazole (PRILOSEC) 40 MG capsule Take 40 mg by mouth once daily. 2/18/19  Yes Historical Provider, MD   tamsulosin (FLOMAX) 0.4 mg Cap Take 1 capsule by mouth once daily. Pt has not started taking as of 2/27/19. 2/5/19  Yes Historical Provider, MD   cyanocobalamin, vitamin B-12, 500 mcg Subl Place 1 tablet under the tongue every Monday.     Historical Provider, MD   furosemide (LASIX) 80 MG tablet Take 1 tablet (80 mg total) by mouth 2 (two) times daily. 5/29/19 5/28/20  Roly Caba MD   metoprolol succinate (TOPROL-XL) 50 MG 24 hr tablet Take 1 tablet (50 mg total) by mouth once daily. 5/29/19 5/28/20  Roly Caba MD   ONETOUCH ULTRA TEST Strp 4 (four) times daily. Use to test blood sugar four times a day. 10/15/15   Historical Provider, MD   vitamin D (VITAMIN D3) 1000 units Tab Take 1 tablet (1,000 Units total) by mouth once daily. 1/31/19   Jian Lambert MD     Anticoagulants/Antiplatelets: no anticoagulation    Allergies: Review of patient's allergies indicates:  No Known Allergies  Sedation History:  no adverse reactions    Review of Systems:   Hematological: no known coagulopathies  Respiratory: no shortness of breath  Cardiovascular: no chest pain  Gastrointestinal: no abdominal pain  Genito-Urinary: no dysuria  Musculoskeletal: negative  Neurological: no TIA or stroke symptoms         OBJECTIVE:     Vital Signs (Most Recent)  Temp: 96.5 °F (35.8 °C) (05/29/19 1608)  Pulse: 72 (05/29/19 1608)  Resp: 18 (05/29/19 1608)  BP: (!) 91/50 (05/29/19 1608)  SpO2: 97 % (05/29/19 1608)    Physical Exam:  ASA: 3  Mallampati: 2    General: no acute distress  Mental Status: alert and oriented to person, place and time  HEENT: normocephalic, atraumatic  Chest: unlabored breathing  Heart: regular heart rate  Abdomen: nondistended  Extremity: moves all extremities    Laboratory  Lab Results   Component Value Date    INR 1.2 05/24/2019       Lab Results   Component Value Date    WBC 7.32 05/29/2019    HGB  8.9 (L) 05/29/2019    HCT 27.3 (L) 05/29/2019    MCV 88 05/29/2019     05/29/2019      Lab Results   Component Value Date     (H) 05/29/2019     (L) 05/29/2019    K 3.1 (L) 05/29/2019     05/29/2019    CO2 24 05/29/2019    BUN 40 (H) 05/29/2019    CREATININE 2.6 (H) 05/29/2019    CALCIUM 7.5 (L) 05/29/2019    MG 1.6 05/29/2019    ALT 25 05/25/2019    AST 29 05/25/2019    ALBUMIN 1.7 (L) 05/29/2019    BILITOT 0.4 05/25/2019    BILIDIR 0.2 07/10/2015       ASSESSMENT/PLAN:     Sedation Plan: Local.  Patient will undergo Paracentesis.    Roberto Millard MD  PGY-II Radiology Resident  [address] [facility]  Pager: [phone]

## 2019-05-31 NOTE — PHYSICIAN QUERY
PT Name: Jian Arrieta  MR #: [id number]     Physician Query Form - Documentation Clarification      CDS/: Glenys Edwards  RN CCDS             Contact information:[email or web address]    This form is a permanent document in the medical record.     Query Date: May 31, 2019    By submitting this query, we are merely seeking further clarification of documentation. Please utilize your independent clinical judgment when addressing the question(s) below.    The Medical record reflects the following:    Supporting Clinical Findings Location in Medical Record     Hypertension associated with diabetes       H&P-DS                                                                                Doctor, Please specify diagnosis or diagnoses associated with above clinical findings.    Provider Use Only    [  X   ] Hypertension due to diabetes     [    ] Other______________                                                                                                                 [  ] Clinically Undetermined

## 2019-06-01 PROCEDURE — 93010 EKG 12-LEAD: ICD-10-PCS | Mod: ,,, | Performed by: INTERNAL MEDICINE

## 2019-06-01 PROCEDURE — 93010 ELECTROCARDIOGRAM REPORT: CPT | Mod: ,,, | Performed by: INTERNAL MEDICINE

## 2019-06-01 PROCEDURE — 93005 ELECTROCARDIOGRAM TRACING: CPT

## 2019-06-02 LAB — BACTERIA SPEC AEROBE CULT: NO GROWTH

## 2019-06-03 ENCOUNTER — TELEPHONE (OUTPATIENT)
Dept: HEPATOLOGY | Facility: CLINIC | Age: 64
End: 2019-06-03

## 2019-06-03 NOTE — TELEPHONE ENCOUNTER
----- Message from Dolly Elias sent at 6/3/2019  3:05 PM CDT -----  Contact: Chrystal [facility]  Needs Advice    Reason for call: Wishes to speak to Dr. Christensen staff about para being schedule 2x weekly.         Communication Preference: [phone]    Additional Information: n/a

## 2019-06-03 NOTE — TELEPHONE ENCOUNTER
Patient called RE: flushing PICC line stated we don't flush in this  deptment needed to speak with PCP

## 2019-06-04 ENCOUNTER — TELEPHONE (OUTPATIENT)
Dept: HEPATOLOGY | Facility: CLINIC | Age: 64
End: 2019-06-04

## 2019-06-04 NOTE — TELEPHONE ENCOUNTER
----- Message from Dolly Elias sent at 6/4/2019  1:48 PM CDT -----  Contact: Chrystal OLY   Needs Advice    Reason for call: Called regarding para and PICC line dressing being changed/flushed.         Communication Preference: [phone]    Additional Information: n/a

## 2019-06-04 NOTE — TELEPHONE ENCOUNTER
"Schedule paracentesis for June 11, @ 1030 and June 14 @ 1030. Patient is be very rude because of PICC line I stated " sir I will let you speak with my manager Anita, he stated "I'm going to wait until she comes on the phone" I stated"no she will not be her until tomorrow" he stated I want this PICC flushed it had not been flushed in 2 weeks then he turned around an said he was at the hospital last week   "

## 2019-06-05 ENCOUNTER — HOSPITAL ENCOUNTER (EMERGENCY)
Facility: HOSPITAL | Age: 64
Discharge: HOME | End: 2019-06-05
Attending: EMERGENCY MEDICINE
Payer: COMMERCIAL

## 2019-06-05 ENCOUNTER — TELEPHONE (OUTPATIENT)
Dept: GASTROENTEROLOGY | Facility: CLINIC | Age: 64
End: 2019-06-05

## 2019-06-05 ENCOUNTER — TELEPHONE (OUTPATIENT)
Dept: HEPATOLOGY | Facility: CLINIC | Age: 64
End: 2019-06-05

## 2019-06-05 ENCOUNTER — TELEPHONE (OUTPATIENT)
Dept: INTERNAL MEDICINE | Facility: CLINIC | Age: 64
End: 2019-06-05

## 2019-06-05 ENCOUNTER — TELEPHONE (OUTPATIENT)
Dept: TRANSPLANT | Facility: CLINIC | Age: 64
End: 2019-06-05

## 2019-06-05 VITALS
HEART RATE: 76 BPM | WEIGHT: 268 LBS | SYSTOLIC BLOOD PRESSURE: 103 MMHG | TEMPERATURE: 99 F | RESPIRATION RATE: 20 BRPM | OXYGEN SATURATION: 96 % | BODY MASS INDEX: 41.97 KG/M2 | DIASTOLIC BLOOD PRESSURE: 61 MMHG

## 2019-06-05 DIAGNOSIS — I89.0 LYMPHEDEMA OF BOTH LOWER EXTREMITIES: ICD-10-CM

## 2019-06-05 DIAGNOSIS — I50.9 CHF (CONGESTIVE HEART FAILURE): ICD-10-CM

## 2019-06-05 DIAGNOSIS — K86.1 OTHER CHRONIC PANCREATITIS: ICD-10-CM

## 2019-06-05 DIAGNOSIS — N18.30 STAGE 3 CHRONIC KIDNEY DISEASE: ICD-10-CM

## 2019-06-05 DIAGNOSIS — R18.8 OTHER ASCITES: Primary | ICD-10-CM

## 2019-06-05 DIAGNOSIS — E11.42 DM TYPE 2 WITH DIABETIC PERIPHERAL NEUROPATHY: ICD-10-CM

## 2019-06-05 DIAGNOSIS — Z98.890 S/P ABDOMINAL PARACENTESIS: ICD-10-CM

## 2019-06-05 PROCEDURE — 49083 ABD PARACENTESIS W/IMAGING: CPT

## 2019-06-05 PROCEDURE — 99283 EMERGENCY DEPT VISIT LOW MDM: CPT | Mod: ,,, | Performed by: EMERGENCY MEDICINE

## 2019-06-05 PROCEDURE — 99283 PR EMERGENCY DEPT VISIT,LEVEL III: ICD-10-PCS | Mod: ,,, | Performed by: EMERGENCY MEDICINE

## 2019-06-05 PROCEDURE — 99283 EMERGENCY DEPT VISIT LOW MDM: CPT | Mod: 25

## 2019-06-05 NOTE — ED PROVIDER NOTES
Encounter Date: 6/5/2019       History     Chief Complaint   Patient presents with    Abdominal Pain     arrived via  EMS with c/o abdominal pain, scheduled to have drained today, called EMS due to pt unable to get in car s/t pain     63-year-old male and which presents the emergency room for the need for paracentesis.  Patient states that he was discharged last week on the 29th and he was told that he would have his paracentesis this week and is having trouble scheduling it.  He spoke to somebody clinic and they told him to come to the emergency room for the paracentesis.  Patient denies increase in shortness of breath or abdominal girth.  Patient states that he thinks that his legs are bigger.  Patient denies any fever chills.      The history is provided by the patient.     Review of patient's allergies indicates:  No Known Allergies  Past Medical History:   Diagnosis Date    Alcohol abuse     Anasarca 1/28/2019    Arthropathy associated with neurological disorder 9/2/2015    Atherosclerosis     Charcot foot due to diabetes mellitus     Chronic combined systolic and diastolic heart failure 01/29/2019 1-28-19 Left VentricleModerate decreased ejection fraction at 30%. Normal left ventricular cavity size. Normal wall thickness observed. Grade I (mild) left ventricular diastolic dysfunction consistent with impaired relaxation. Normal left atrial pressure. Moderate, global hypokinesis(see wall scoring diagram). Right VentricleNormal cavity size, wall thickness and ejection fraction. Wall motion n    Chronic pancreatitis 1/28/2019    Colon polyps     approx 5 yrs ago    Coronary artery disease due to calcified coronary lesion 05/08/2015    5 stents on ASA      Diabetic polyneuropathy associated with type 2 diabetes mellitus 9/2/2015    Diverticulosis 1/28/2019    DM type 2 with diabetic peripheral neuropathy 2/4/2019    Essential hypertension 1/28/2019    Former smoker 8/26/2015    Healed ulcer of  left foot on examination 6/20/2017    Hydrocele     approx 1.5 yrs ago    Hypoalbuminemia 2/4/2019    Lymphedema of both lower extremities 1/29/2019    Mixed hyperlipidemia 5/8/2015    Morbid obesity with BMI of 50.0-59.9, adult 5/8/2015    Onychomycosis of multiple toenails with type 2 diabetes mellitus and peripheral neuropathy 6/20/2017    Perianal cyst     approx 2 yrs ago    Pseudocyst of pancreas 1/28/2019 1-28-19 Liver has a cirrhotic morphology with no focal lesions.  Significant interval increase in ascites when compared to prior exam which may account for patient's abdominal distension.  Hypodense air-fluid collection along the body of the pancreas which is slightly smaller when compared to prior CT.  Findings may relate to pancreatic necrosis with pancreatic pseudocysts with infected pseudocyst    Skin cancer     skin cancer    Sleep apnea 8/26/2015    Status post bariatric surgery 1/11/2016    Type 2 diabetes mellitus, with long-term current use of insulin 5/8/2015     Past Surgical History:   Procedure Laterality Date    ANGIOGRAM, CORONARY ARTERY Right 3/20/2019    Performed by [facility]que MD at [facility] CATH LAB    ANGIOPLASTY      total x5 stents    Bypass graft study  3/20/2019    Performed by Bob Duque MD at [facility] CATH LAB    COLONOSCOPY N/A 10/6/2015    Performed by Shekhar Richards MD at [facility] ENDO (2ND FLR)    CORONARY ARTERY BYPASS GRAFT  2017    x3    CYST REMOVAL      GASTRECTOMY      GASTRECTOMY-SLEEVE-LAPAROSCOPIC - 37630 N/A 12/22/2015    Performed by Micheal Villavicencio Jr., MD at [facility] OR 2ND FLR    KNEE ARTHROSCOPY      perianal surgery      perianal cyst removed    pleurx N/A 5/17/2019    Performed by [facility] Diagnostic Provider at [facility] OR 2ND FLR     Family History   Problem Relation Age of Onset    Cancer Mother     Cancer Father     Heart disease Father     Obesity Sister     Parkinsonism Brother     No Known Problems Paternal Grandmother      Cancer Paternal Grandfather     Cancer Brother     Diabetes Maternal Grandmother     Stroke Maternal Grandfather     Cirrhosis Neg Hx      Social History     Tobacco Use    Smoking status: Former Smoker     Packs/day: 2.00     Years: 30.00     Pack years: 60.00     Types: Cigarettes     Last attempt to quit: 2005     Years since quittin.3    Smokeless tobacco: Never Used   Substance Use Topics    Alcohol use: No     Comment: started ~, reports 1 shot daily, max 3 shots daily, vague about alcohol consumption. Last drink 2018    Drug use: No     Review of Systems   Constitutional: Negative for fever.   HENT: Negative for sore throat.    Respiratory: Negative for shortness of breath.    Cardiovascular: Negative for chest pain.   Gastrointestinal: Negative for abdominal pain, nausea and vomiting.   Genitourinary: Negative for dysuria.   Musculoskeletal: Negative for back pain.   Skin: Negative for rash.   Neurological: Negative for weakness.   Hematological: Does not bruise/bleed easily.   All other systems reviewed and are negative.    Physical Exam     Initial Vitals   BP Pulse Resp Temp SpO2   19 1323 19 1323 19 1323 19 1834 19 1323   100/62 88 16 98.8 °F (37.1 °C) 96 %      MAP       --                Physical Exam    Nursing note reviewed.  Constitutional: He appears well-developed.   HENT:   Head: Normocephalic and atraumatic.   Eyes: Conjunctivae and EOM are normal. Pupils are equal, round, and reactive to light.   Neck: Normal range of motion.   Cardiovascular: Normal rate, regular rhythm, normal heart sounds and intact distal pulses. Exam reveals no gallop and no friction rub.    No murmur heard.  Pulmonary/Chest: Breath sounds normal. No respiratory distress. He has no wheezes. He has no rhonchi. He has no rales. He exhibits no tenderness.   Abdominal: Soft. Bowel sounds are normal. He exhibits distension (Large abdominal girth noted-unable to assess for  fluid wave secondary to obesity). He exhibits no mass. There is no tenderness. There is no rebound and no guarding.   Musculoskeletal: Normal range of motion. He exhibits no edema or tenderness.   Neurological: He is alert and oriented to person, place, and time. He has normal strength. GCS score is 15. GCS eye subscore is 4. GCS verbal subscore is 5. GCS motor subscore is 6.   Skin:   Hyperpigmentation consistent with venous stasis noted to bilateral lower extremities; no signs of infection       ED Course   Procedures  Labs Reviewed - No data to display       Imaging Results          IR Paracentesis with Imaging (Final result)  Result time 06/05/19 18:13:03    Final result by Trent Denny MD (06/05/19 18:13:03)                 Impression:      Ultrasound-guided paracentesis with drainage of 1300 mL of serous fluid.    Attestation:    Signer name: Trent Denny MD.    I attest that I was present for the entire procedure. I reviewed the stored images and agree with the report as written    Electronically signed by resident: Roberto Millard  Date:    06/05/2019  Time:    18:05    Electronically signed by: Trent Denny MD  Date:    06/05/2019  Time:    18:13             Narrative:    EXAMINATION:  Ultrasound-guided Paracentesis    Procedural Personnel    Attending physician(s): Trent Denny MD.    Fellow physician(s): None    Resident physician(s): Roberto Millard MD.    Advanced practice provider(s): None    Pre-procedure diagnosis: Ascites    Post-procedure diagnosis: Same    Indication: Distension    Complications: No immediate complications.    PROCEDURAL SUMMARY:  Ultrasound-guided paracentesis    PROCEDURE:  Pre-procedure:    Consent: Informed consent for the procedure was obtained and time-out was performed prior to the procedure.    Preparation: The site was prepared and draped using maximal sterile barrier technique including cutaneous antisepsis.    Anesthesia/sedation:    Level of  anesthesia/sedation: No sedation    Anesthesia/sedation administered by: Not applicable    Total intra-service sedation time (minutes): None    Limited abdominal ultrasound:    Limited abdominal ultrasound was performed.    A safe window for paracentesis was identified.    Paracentesis    Local anesthesia was administered. The peritoneal cavity was accessed and fluid return confirmed position. Ascites was drained.    Paracentesis access technique: Real-time ultrasound guidance    Catheter placed: 5F Yueh    Closure:    The catheter was removed. A sterile bandage was applied.    Post-drainage ultrasound: Not performed    Additional Details:    Additional description of procedure: None    Equipment details: None    Specimens removed: Abdominal fluid    Estimated blood loss (mL): Less than 10    Standardized report: SIR_Paracentesis_v2.                                 Medical Decision Making:   Initial Assessment:   63-year-old male and which presents the emergency room for the need for paracentesis.  Patient states that he was discharged last week on the 29th and he was told that he would have his paracentesis this week and is having trouble scheduling it.  He spoke to somebody clinic and they told him to come to the emergency room for the paracentesis.  Patient denies increase in shortness of breath or abdominal girth.  Patient states that he thinks that his legs are bigger.  Patient denies any fever chills.    Differential Diagnosis:   Ascites, constipation, need for paracentesis  ED Management:  Patient examined and upon looking at chart patient does have recommendations to have a paracentesis twice a week.  IR consulted and patient will have a paracentesis completed prior to discharge. IR asked that we discharge the patient from the ED.  Paracentesis completed and they were able to get 1300 cc of fluid.  Patient was discharged without complication.  Patient given strict return precautions and voiced understanding  of all discharge instructions.  Patient stable discharge.              Attending Attestation:     Physician Attestation Statement for NP/PA:   I have conducted a face to face encounter with this patient in addition to the NP/PA, due to Medical Complexity    Other NP/PA Attestation Additions:      Medical Decision Makin-year-old male presents to the emergency department as he was supposed to have paracentesis scheduled twice a week, he was discharged from the hospital a week ago, and he has not been having any paracentesis scheduled in the meantime.  Chart reviewed with paracentesis scheduled for  and .     Patient reports his abdomen gets more distended, severely swollen lower extremity edema however at baseline for the patient, no shortness of breath    IR was consulted for paracentesis                 ED Course as of    1527 IR consulted for paracentesis- per patient he is supposed to be scheduled twice a week and does not have an appointment until     [AT]   1554 IR Nurse (Bridgette) spoken to and the patient will have a paracentesis completed today    [AT]      ED Course User Index  [AT] VONDA Swift     Clinical Impression:       ICD-10-CM ICD-9-CM   1. Other ascites R18.8 789.59   2. CHF (congestive heart failure) I50.9 428.0   3. Stage 3 chronic kidney disease N18.3 585.3   4. DM type 2 with diabetic peripheral neuropathy E11.42 250.60     357.2   5. Lymphedema of both lower extremities I89.0 457.1   6. Other chronic pancreatitis K86.1 577.1   7. S/P abdominal paracentesis Z98.890 V45.89                                VONDA Swift  19

## 2019-06-05 NOTE — TELEPHONE ENCOUNTER
Returned call to patient, patient stated that he was in the ER he wanted to know why he was not scheduled for his paracentesis. Informed patient that he's paracentesis were scheduled for 6/11/19 and 6/14/19. Verbalized understanding.

## 2019-06-05 NOTE — TELEPHONE ENCOUNTER
----- Message from Steph Chamberlain sent at 6/5/2019 11:27 AM CDT -----  Contact: [phone]/self  Patient requesting to speak with you regarding scheduling his procedure.  Please advise.

## 2019-06-05 NOTE — ED TRIAGE NOTES
Pt arrived from home via EMS. Pt was scheduled to have paracentesis today. Pt is having abd pain and increased swelling to abd and bilateral legs. 4+ pitting edema to legs and thighs.

## 2019-06-05 NOTE — TELEPHONE ENCOUNTER
----- Message from Anita Dodge sent at 6/5/2019  1:03 PM CDT -----  Contact: Nena Jackman      ----- Message -----  From: Trupti Liu  Sent: 6/5/2019  11:38 AM  To: Anita Dodge    Pt calling w/complaint regarding paracentesis     Was advised by Anita to send msg to her inbox    Pt contact [phone]

## 2019-06-05 NOTE — TELEPHONE ENCOUNTER
Spoke to patient/ and sister (Nena) patient was very rude and kept raising his voice-- as I was trying to assist him/sister    They have [facility]--    Requesting PT 5 times a week at home  Wound care to legs  3 x /week  Flush PICC line    Instructed they would have to contact Hematology to schedule paracentesis. Stated understanding.

## 2019-06-05 NOTE — H&P
Radiology History & Physical      SUBJECTIVE:     Chief Complaint: Ascites.    History of Present Illness:  Jian Arrieta is a 63 y.o. male who presents for paracentesis.    Past Medical History:   Diagnosis Date    Alcohol abuse     Anasarca 1/28/2019    Arthropathy associated with neurological disorder 9/2/2015    Atherosclerosis     Charcot foot due to diabetes mellitus     Chronic combined systolic and diastolic heart failure 01/29/2019 1-28-19 Left VentricleModerate decreased ejection fraction at 30%. Normal left ventricular cavity size. Normal wall thickness observed. Grade I (mild) left ventricular diastolic dysfunction consistent with impaired relaxation. Normal left atrial pressure. Moderate, global hypokinesis(see wall scoring diagram). Right VentricleNormal cavity size, wall thickness and ejection fraction. Wall motion n    Chronic pancreatitis 1/28/2019    Colon polyps     approx 5 yrs ago    Coronary artery disease due to calcified coronary lesion 05/08/2015    5 stents on ASA      Diabetic polyneuropathy associated with type 2 diabetes mellitus 9/2/2015    Diverticulosis 1/28/2019    DM type 2 with diabetic peripheral neuropathy 2/4/2019    Essential hypertension 1/28/2019    Former smoker 8/26/2015    Healed ulcer of left foot on examination 6/20/2017    Hydrocele     approx 1.5 yrs ago    Hypoalbuminemia 2/4/2019    Lymphedema of both lower extremities 1/29/2019    Mixed hyperlipidemia 5/8/2015    Morbid obesity with BMI of 50.0-59.9, adult 5/8/2015    Onychomycosis of multiple toenails with type 2 diabetes mellitus and peripheral neuropathy 6/20/2017    Perianal cyst     approx 2 yrs ago    Pseudocyst of pancreas 1/28/2019 1-28-19 Liver has a cirrhotic morphology with no focal lesions.  Significant interval increase in ascites when compared to prior exam which may account for patient's abdominal distension.  Hypodense air-fluid collection along the body of the pancreas  which is slightly smaller when compared to prior CT.  Findings may relate to pancreatic necrosis with pancreatic pseudocysts with infected pseudocyst    Skin cancer     skin cancer    Sleep apnea 8/26/2015    Status post bariatric surgery 1/11/2016    Type 2 diabetes mellitus, with long-term current use of insulin 5/8/2015     Past Surgical History:   Procedure Laterality Date    ANGIOGRAM, CORONARY ARTERY Right 3/20/2019    Performed by Bob Duque MD at [facility] CATH LAB    ANGIOPLASTY      total x5 stents    Bypass graft study  3/20/2019    Performed by Bob [facility]que MD at [facility] CATH LAB    COLONOSCOPY N/A 10/6/2015    Performed by Shekhar Richards MD at [facility] ENDO (2ND FLR)    CORONARY ARTERY BYPASS GRAFT  2017    x3    CYST REMOVAL      GASTRECTOMY      GASTRECTOMY-SLEEVE-LAPAROSCOPIC - 73913 N/A 12/22/2015    Performed by Micheal Villavicencio Jr., MD at [facility] OR 2ND FLR    KNEE ARTHROSCOPY      perianal surgery      perianal cyst removed    pleurx N/A 5/17/2019    Performed by [facility] Provider at [facility] OR 2ND FLR       Home Meds:   Prior to Admission medications    Medication Sig Start Date End Date Taking? Authorizing Provider   apixaban (ELIQUIS) 5 mg Tab Take 1 tablet (5 mg total) by mouth 2 (two) times daily. 3/20/19   Lorie Higuera MD   atorvastatin (LIPITOR) 40 MG tablet Take 1 tablet (40 mg total) by mouth once daily. 2/4/19 2/4/20  Alfonso Mahmood MD   clopidogrel (PLAVIX) 75 mg tablet Take 1 tablet (75 mg total) by mouth once daily. 3/21/19 3/20/20  Lorie Higuera MD   cyanocobalamin, vitamin B-12, 500 mcg Subl Place 1 tablet under the tongue every Monday.     Historical Provider, MD   furosemide (LASIX) 80 MG tablet Take 1 tablet (80 mg total) by mouth 2 (two) times daily. 5/29/19 5/28/20  Roly Caba MD   insulin aspart U-100 (NOVOLOG) 100 unit/mL injection Inject 4 Units into the skin 3 (three) times daily before meals.    Historical Provider, MD   insulin detemir  (LEVEMIR FLEXPEN SUBQ) Inject 12 Units into the skin once daily.     Historical Provider, MD   lipase-protease-amylase (CREON) 36,000-114,000- 180,000 unit CpDR Take 1 capsule by mouth 3 (three) times daily. 2/4/19   Alfonso Mahmood MD   metoprolol succinate (TOPROL-XL) 50 MG 24 hr tablet Take 1 tablet (50 mg total) by mouth once daily. 5/29/19 5/28/20  Roly Caba MD   omeprazole (PRILOSEC) 40 MG capsule Take 40 mg by mouth once daily. 2/18/19   Historical Provider, MD   ONETOUCH ULTRA TEST Strp 4 (four) times daily. Use to test blood sugar four times a day. 10/15/15   Historical Provider, MD   tamsulosin (FLOMAX) 0.4 mg Cap Take 1 capsule by mouth once daily. Pt has not started taking as of 2/27/19. 2/5/19   Historical Provider, MD     Anticoagulants/Antiplatelets: no anticoagulation    Allergies: Review of patient's allergies indicates:  No Known Allergies  Sedation History:  no adverse reactions    Review of Systems:   Hematological: no known coagulopathies  Respiratory: no shortness of breath  Cardiovascular: no chest pain  Gastrointestinal: no abdominal pain  Genito-Urinary: no dysuria  Musculoskeletal: negative  Neurological: no TIA or stroke symptoms         OBJECTIVE:     Vital Signs (Most Recent)  Pulse: 96 (06/05/19 1540)  Resp: 19 (06/05/19 1540)  BP: 97/65 (06/05/19 1541)  SpO2: 100 % (06/05/19 1540)    Physical Exam:  ASA: 3  Mallampati: 2    General: no acute distress  Mental Status: alert and oriented to person, place and time  HEENT: normocephalic, atraumatic  Chest: unlabored breathing  Heart: regular heart rate  Abdomen: nondistended  Extremity: moves all extremities    Laboratory  Lab Results   Component Value Date    INR 1.2 05/24/2019       Lab Results   Component Value Date    WBC 7.32 05/29/2019    HGB 8.9 (L) 05/29/2019    HCT 27.3 (L) 05/29/2019    MCV 88 05/29/2019     05/29/2019      Lab Results   Component Value Date     (H) 05/29/2019     (L) 05/29/2019    K 3.1  (L) 05/29/2019     05/29/2019    CO2 24 05/29/2019    BUN 40 (H) 05/29/2019    CREATININE 2.6 (H) 05/29/2019    CALCIUM 7.5 (L) 05/29/2019    MG 1.6 05/29/2019    ALT 25 05/25/2019    AST 29 05/25/2019    ALBUMIN 1.7 (L) 05/29/2019    BILITOT 0.4 05/25/2019    BILIDIR 0.2 07/10/2015       ASSESSMENT/PLAN:     Sedation Plan: Local.  Patient will undergo Paracentesis.    Roberto Millard MD  PGY-II Radiology Resident  [address] [facility]  Pager: [phone]

## 2019-06-05 NOTE — PROCEDURES
Radiology Post-Procedure Note    Pre Op Diagnosis: Ascites  Post Op Diagnosis: Same    Procedure: Ultrasound Guided Paracentesis    Procedure performed by: Trent Denny MD. Roberto Millard MD.    Written Informed Consent Obtained: Yes  Specimen Removed: YES   Estimated Blood Loss: Minimal    Findings:   Successful paracentesis.  Albumin administered PRN per protocol.    Patient tolerated procedure well.    Roberto Millard MD  PGY-II Radiology Resident  [address] [facility]  Pager: [phone]

## 2019-06-05 NOTE — DISCHARGE INSTRUCTIONS
For scheduling: Call Isabelle at [phone]    For questions or concerns call: KESHAV MON-FRI 8 AM- 5PM [phone]. Radiology resident on call [phone].    For immediate concerns that are not emergent, you may call our radiology clinic at: [phone]

## 2019-06-05 NOTE — TELEPHONE ENCOUNTER
----- Message from Henny Saucedo sent at 6/4/2019  4:45 PM CDT -----  Contact: self  [phone] or Nena Jackman(sister) [phone]  Patient was discharged on 5/29 and needs all of his home health orders to be approved by Dr Barajas. Home Health is thru [facility].   Reason for call: Called regarding para and PICC line dressing being changed/flushed.

## 2019-06-05 NOTE — TELEPHONE ENCOUNTER
----- Message from Rachell [place] NP sent at 6/5/2019 11:17 AM CDT -----  Contact: Nena Gasper   He's actually not my patient. I believe Dr. Christensen is following him now.    Thanks,  Rachell    ----- Message -----  From: Santa Mays MA  Sent: 6/5/2019  10:36 AM  To: Rachell [place] NP    Do u want me to call and schedule him a para  ----- Message -----  From: Aditya Chavarria  Sent: 6/5/2019  10:24 AM  To: Ronny Lovett Staff    Contact: Patient sister Nena     Message: Calling to schedule a paracentesis test    Communication Preference: [phone]    Additional Information:     Thanks!

## 2019-06-06 ENCOUNTER — TELEPHONE (OUTPATIENT)
Dept: INTERNAL MEDICINE | Facility: CLINIC | Age: 64
End: 2019-06-06

## 2019-06-06 LAB — BACTERIA SPEC ANAEROBE CULT: NORMAL

## 2019-06-06 NOTE — TELEPHONE ENCOUNTER
----- Message from Sophie Peterson sent at 6/6/2019 12:11 PM CDT -----  Contact: Chrystal [facility] [phone]  Chrystal will like to speak to the nurse in regards to pt order for Piccline

## 2019-06-06 NOTE — TELEPHONE ENCOUNTER
Noni not a piccline; need orders for -- Chrystal @[facility] will take care of the Infusion and keep us posted.

## 2019-06-07 ENCOUNTER — TELEPHONE (OUTPATIENT)
Dept: GASTROENTEROLOGY | Facility: CLINIC | Age: 64
End: 2019-06-07

## 2019-06-07 NOTE — TELEPHONE ENCOUNTER
Extensive conversation with patient's sister.  Apologized for any inconvenience or confusion with care.  Scheduled follow up appointment with Dr. Hall on 6/27.  Encouraged patient to keep all IR appointments.  Will send a message to Dr. Hall for recommendations.

## 2019-06-10 ENCOUNTER — TELEPHONE (OUTPATIENT)
Dept: INTERNAL MEDICINE | Facility: CLINIC | Age: 64
End: 2019-06-10

## 2019-06-10 NOTE — TELEPHONE ENCOUNTER
----- Message from Marta Garcia MA sent at 6/7/2019  3:45 PM CDT -----  Contact: Nena pt's sister       ----- Message -----  From: Shayy Feng  Sent: 6/7/2019   3:33 PM  To: Suzy Monk Staff    Needs Advice    Reason for call: Speak with Aleshia regarding pt's care        Communication Preference: [phone]    Additional Information: N/A

## 2019-06-10 NOTE — TELEPHONE ENCOUNTER
I am sending this message to Dr. Christensen nurse, Patient has been complaining about his Cheney not being flushed weekly and dressing changes. If you are not using the Cheney then it needs to be removed or orders need to be put in by the person that originally ordered the Cheney.

## 2019-06-10 NOTE — TELEPHONE ENCOUNTER
Instructed patient to ask for PT/OT in-patient rehab while at the hospital. Insurance denied in home PT/OT

## 2019-06-10 NOTE — TELEPHONE ENCOUNTER
Patient is requesting order for Infusion company, needed to get Cheney flushed and supplies. What is being  Infused? Albumin per patient  Dr. Christensen ordered the Cheney, told pt it needs to be ordered by interventional radiology. Dr. Christensen is not ordering the IR. Dressing needs to be changed weekly.  Patient was instructed that they needed to contact the Doctor that originally ordered the Cheney for infusion.    Dr. Barajas has not ordered the Cheney or is  aware for what it is used for. He states he did not order this. Spoke to Aury Clifford RN  She instructed that Dr. Barajas never ordered    the Cheney for infusion. Patient was notified and given patient relation # [phone]

## 2019-06-10 NOTE — TELEPHONE ENCOUNTER
----- Message from Beltran Rodriguez sent at 6/10/2019 12:05 PM CDT -----  [phone]  Ifrah is requesting that you call her first so she can review this with you.  Needs to set up a peer to peer for the patient to come to in patient rehab.  The peer to peer is with the medical director at [facility] and Dr. Barajas.  [facility] telephone # is [phone]  The patient is currently outpatient at home with [facility] taking care of him and getting PT and OT at home.   Patient is now needing in-patinet rehab.

## 2019-06-11 ENCOUNTER — HOSPITAL ENCOUNTER (EMERGENCY)
Facility: HOSPITAL | Age: 64
Discharge: HOME | End: 2019-06-11
Attending: EMERGENCY MEDICINE
Payer: COMMERCIAL

## 2019-06-11 ENCOUNTER — TELEPHONE (OUTPATIENT)
Dept: INTERNAL MEDICINE | Facility: CLINIC | Age: 64
End: 2019-06-11

## 2019-06-11 ENCOUNTER — TELEPHONE (OUTPATIENT)
Dept: EMERGENCY MEDICINE | Facility: HOSPITAL | Age: 64
End: 2019-06-11

## 2019-06-11 VITALS
BODY MASS INDEX: 43.63 KG/M2 | HEIGHT: 67 IN | TEMPERATURE: 98 F | WEIGHT: 278 LBS | HEART RATE: 86 BPM | SYSTOLIC BLOOD PRESSURE: 91 MMHG | DIASTOLIC BLOOD PRESSURE: 61 MMHG | OXYGEN SATURATION: 100 % | RESPIRATION RATE: 16 BRPM

## 2019-06-11 VITALS
SYSTOLIC BLOOD PRESSURE: 91 MMHG | WEIGHT: 278 LBS | OXYGEN SATURATION: 100 % | DIASTOLIC BLOOD PRESSURE: 52 MMHG | HEIGHT: 67 IN | RESPIRATION RATE: 16 BRPM | BODY MASS INDEX: 43.63 KG/M2 | TEMPERATURE: 97 F | HEART RATE: 74 BPM

## 2019-06-11 DIAGNOSIS — R18.8 OTHER ASCITES: ICD-10-CM

## 2019-06-11 DIAGNOSIS — E66.01 MORBID OBESITY WITH BMI OF 50.0-59.9, ADULT: ICD-10-CM

## 2019-06-11 DIAGNOSIS — I50.9 CONGESTIVE HEART FAILURE, UNSPECIFIED HF CHRONICITY, UNSPECIFIED HEART FAILURE TYPE: ICD-10-CM

## 2019-06-11 DIAGNOSIS — R29.6 FREQUENT FALLS: Primary | ICD-10-CM

## 2019-06-11 DIAGNOSIS — K86.1 OTHER CHRONIC PANCREATITIS: ICD-10-CM

## 2019-06-11 DIAGNOSIS — N18.30 STAGE 3 CHRONIC KIDNEY DISEASE: ICD-10-CM

## 2019-06-11 DIAGNOSIS — R26.89 DECREASED MOBILITY: Primary | ICD-10-CM

## 2019-06-11 DIAGNOSIS — E87.6 HYPOKALEMIA: ICD-10-CM

## 2019-06-11 DIAGNOSIS — I89.0 LYMPHEDEMA OF BOTH LOWER EXTREMITIES: ICD-10-CM

## 2019-06-11 LAB
ALBUMIN SERPL BCP-MCNC: 1.6 G/DL (ref 3.5–5.2)
ALP SERPL-CCNC: 141 U/L (ref 55–135)
ALT SERPL W/O P-5'-P-CCNC: 25 U/L (ref 10–44)
ANION GAP SERPL CALC-SCNC: 7 MMOL/L (ref 8–16)
AST SERPL-CCNC: 19 U/L (ref 10–40)
BASOPHILS # BLD AUTO: 0.03 K/UL (ref 0–0.2)
BASOPHILS NFR BLD: 0.4 % (ref 0–1.9)
BILIRUB SERPL-MCNC: 0.3 MG/DL (ref 0.1–1)
BILIRUB UR QL STRIP: NEGATIVE
BUN SERPL-MCNC: 51 MG/DL (ref 8–23)
CALCIUM SERPL-MCNC: 7.2 MG/DL (ref 8.7–10.5)
CHLORIDE SERPL-SCNC: 102 MMOL/L (ref 95–110)
CLARITY UR REFRACT.AUTO: CLEAR
CO2 SERPL-SCNC: 21 MMOL/L (ref 23–29)
COLOR UR AUTO: YELLOW
CREAT SERPL-MCNC: 2.7 MG/DL (ref 0.5–1.4)
DIFFERENTIAL METHOD: ABNORMAL
EOSINOPHIL # BLD AUTO: 0.1 K/UL (ref 0–0.5)
EOSINOPHIL NFR BLD: 1.1 % (ref 0–8)
ERYTHROCYTE [DISTWIDTH] IN BLOOD BY AUTOMATED COUNT: 14.8 % (ref 11.5–14.5)
EST. GFR  (AFRICAN AMERICAN): 27.5 ML/MIN/1.73 M^2
EST. GFR  (NON AFRICAN AMERICAN): 23.8 ML/MIN/1.73 M^2
GLUCOSE SERPL-MCNC: 290 MG/DL (ref 70–110)
GLUCOSE UR QL STRIP: NEGATIVE
HCT VFR BLD AUTO: 27.3 % (ref 40–54)
HGB BLD-MCNC: 9 G/DL (ref 14–18)
HGB UR QL STRIP: NEGATIVE
IMM GRANULOCYTES # BLD AUTO: 0.03 K/UL (ref 0–0.04)
IMM GRANULOCYTES NFR BLD AUTO: 0.4 % (ref 0–0.5)
KETONES UR QL STRIP: NEGATIVE
LEUKOCYTE ESTERASE UR QL STRIP: NEGATIVE
LIPASE SERPL-CCNC: <3 U/L (ref 4–60)
LYMPHOCYTES # BLD AUTO: 1.4 K/UL (ref 1–4.8)
LYMPHOCYTES NFR BLD: 19.1 % (ref 18–48)
MCH RBC QN AUTO: 28.9 PG (ref 27–31)
MCHC RBC AUTO-ENTMCNC: 33 G/DL (ref 32–36)
MCV RBC AUTO: 88 FL (ref 82–98)
MONOCYTES # BLD AUTO: 0.4 K/UL (ref 0.3–1)
MONOCYTES NFR BLD: 5.7 % (ref 4–15)
NEUTROPHILS # BLD AUTO: 5.5 K/UL (ref 1.8–7.7)
NEUTROPHILS NFR BLD: 73.3 % (ref 38–73)
NITRITE UR QL STRIP: NEGATIVE
NRBC BLD-RTO: 0 /100 WBC
PH UR STRIP: 5 [PH] (ref 5–8)
PLATELET # BLD AUTO: 336 K/UL (ref 150–350)
PMV BLD AUTO: 9.9 FL (ref 9.2–12.9)
POCT GLUCOSE: 278 MG/DL (ref 70–110)
POTASSIUM SERPL-SCNC: 3 MMOL/L (ref 3.5–5.1)
PROT SERPL-MCNC: 4.5 G/DL (ref 6–8.4)
PROT UR QL STRIP: NEGATIVE
RBC # BLD AUTO: 3.11 M/UL (ref 4.6–6.2)
SODIUM SERPL-SCNC: 130 MMOL/L (ref 136–145)
SP GR UR STRIP: 1.01 (ref 1–1.03)
URN SPEC COLLECT METH UR: NORMAL
WBC # BLD AUTO: 7.53 K/UL (ref 3.9–12.7)

## 2019-06-11 PROCEDURE — 99283 EMERGENCY DEPT VISIT LOW MDM: CPT

## 2019-06-11 PROCEDURE — 99285 EMERGENCY DEPT VISIT HI MDM: CPT | Mod: 25,,, | Performed by: PHYSICIAN ASSISTANT

## 2019-06-11 PROCEDURE — 99283 PR EMERGENCY DEPT VISIT,LEVEL III: ICD-10-PCS | Mod: ,,, | Performed by: NURSE PRACTITIONER

## 2019-06-11 PROCEDURE — 82962 GLUCOSE BLOOD TEST: CPT

## 2019-06-11 PROCEDURE — 80053 COMPREHEN METABOLIC PANEL: CPT

## 2019-06-11 PROCEDURE — 25000003 PHARM REV CODE 250: Performed by: PHYSICIAN ASSISTANT

## 2019-06-11 PROCEDURE — 81003 URINALYSIS AUTO W/O SCOPE: CPT

## 2019-06-11 PROCEDURE — 85025 COMPLETE CBC W/AUTO DIFF WBC: CPT

## 2019-06-11 PROCEDURE — 99283 EMERGENCY DEPT VISIT LOW MDM: CPT | Mod: ,,, | Performed by: NURSE PRACTITIONER

## 2019-06-11 PROCEDURE — 99283 EMERGENCY DEPT VISIT LOW MDM: CPT | Mod: 25,27

## 2019-06-11 PROCEDURE — 99285 PR EMERGENCY DEPT VISIT,LEVEL V: ICD-10-PCS | Mod: 25,,, | Performed by: PHYSICIAN ASSISTANT

## 2019-06-11 PROCEDURE — 83690 ASSAY OF LIPASE: CPT

## 2019-06-11 RX ORDER — VANCOMYCIN 2 GRAM/500 ML IN 0.9 % SODIUM CHLORIDE INTRAVENOUS
15
Status: DISCONTINUED | OUTPATIENT
Start: 2019-06-11 | End: 2019-06-11

## 2019-06-11 RX ORDER — ONDANSETRON 2 MG/ML
8 INJECTION INTRAMUSCULAR; INTRAVENOUS
Status: DISCONTINUED | OUTPATIENT
Start: 2019-06-11 | End: 2019-06-11 | Stop reason: HOSPADM

## 2019-06-11 RX ORDER — POTASSIUM CHLORIDE 20 MEQ/1
40 TABLET, EXTENDED RELEASE ORAL
Status: COMPLETED | OUTPATIENT
Start: 2019-06-11 | End: 2019-06-11

## 2019-06-11 RX ADMIN — POTASSIUM CHLORIDE 40 MEQ: 1500 TABLET, EXTENDED RELEASE ORAL at 10:06

## 2019-06-11 NOTE — ED NOTES
"LOC: The patient is awake, alert and aware of environment with an appropriate affect, the patient is oriented to person and place and speaking appropriately.  Pt eating sandwich.  APPEARANCE: Patient resting comfortably and in no acute distress, patient's clothing is properly fastened.  SKIN: The skin is warm and dry, color consistent with ethnicity, patient has normal skin turgor and moist mucus membranes.  Pt with dressings to bilat hands, pt states "my skin tears real easy".  Edema to bilat lower extremities.  MUSCULOSKELETAL: Patient moving all extremities spontaneously, no obvious deformities noted.  RESPIRATORY: Airway is open and patent, respirations are spontaneous, patient has a normal effort and rate, no accessory muscle use noted.        "

## 2019-06-11 NOTE — ED NOTES
"Pt requests a urinal, urinal handed to pt, pt states, "just get me to the bathroom now!" Pt assisted to restroom.   "

## 2019-06-11 NOTE — TELEPHONE ENCOUNTER
Patient's PCP office called to verify Mr. Arrieta and his sister's conversation with Dr. Barajas's office. Per FLETCHER Mcmillan, the patient was NOT told to return to the ED. Per the office, the patient was told by the insurance company to return to the ED. Patient and sister, Nena, have been updated.

## 2019-06-11 NOTE — ED NOTES
"Patient able to ambulate 5 feet to w/c and transfer with moderate assistance. States gets "winded" after 20 seconds of ambulation.   "

## 2019-06-11 NOTE — ED NOTES
Patient insists on waiting outside for an ED room. Patient wheeled outside per request.   unknown same name as above

## 2019-06-11 NOTE — TELEPHONE ENCOUNTER
----- Message from Gini Mcdonald sent at 6/11/2019  1:52 PM CDT -----  Contact: sister Nena Jackman call  [phone]  Er discharged him and  was denied in patient Rehab.

## 2019-06-11 NOTE — ED PROVIDER NOTES
"Encounter Date: 6/11/2019       History     Chief Complaint   Patient presents with    Social Work Assessment     "I need to be evaluated because I'm not able to take care of myself" Sister (on phone) states he needs to be admitted to observation and evaluated for skilled nursing facility.     63-year-old male with previous alcohol abuse, chronic pancreatitis, ascites, deconditioning, morbid obesity, diabetes, hypertension and multiple other comorbidities which presents the emergency room for admission to a skilled nursing facility.  Patient was in the emergency room earlier today for shortness of breath and was discharged.  Patient went to his appointment for his paracentesis and then was instructed by his sister come back to the emergency room to that he can be admitted.  Patient denies any new symptoms. He is no longer having shortness of breath.    The history is provided by the patient.     Review of patient's allergies indicates:  No Known Allergies  Past Medical History:   Diagnosis Date    Alcohol abuse     Anasarca 1/28/2019    Arthropathy associated with neurological disorder 9/2/2015    Atherosclerosis     Charcot foot due to diabetes mellitus     Chronic combined systolic and diastolic heart failure 01/29/2019 1-28-19 Left VentricleModerate decreased ejection fraction at 30%. Normal left ventricular cavity size. Normal wall thickness observed. Grade I (mild) left ventricular diastolic dysfunction consistent with impaired relaxation. Normal left atrial pressure. Moderate, global hypokinesis(see wall scoring diagram). Right VentricleNormal cavity size, wall thickness and ejection fraction. Wall motion n    Chronic pancreatitis 1/28/2019    Colon polyps     approx 5 yrs ago    Coronary artery disease due to calcified coronary lesion 05/08/2015    5 stents on ASA      Diabetic polyneuropathy associated with type 2 diabetes mellitus 9/2/2015    Diverticulosis 1/28/2019    DM type 2 with " diabetic peripheral neuropathy 2/4/2019    Essential hypertension 1/28/2019    Former smoker 8/26/2015    Healed ulcer of left foot on examination 6/20/2017    Hydrocele     approx 1.5 yrs ago    Hypoalbuminemia 2/4/2019    Lymphedema of both lower extremities 1/29/2019    Mixed hyperlipidemia 5/8/2015    Morbid obesity with BMI of 50.0-59.9, adult 5/8/2015    Onychomycosis of multiple toenails with type 2 diabetes mellitus and peripheral neuropathy 6/20/2017    Perianal cyst     approx 2 yrs ago    Pseudocyst of pancreas 1/28/2019 1-28-19 Liver has a cirrhotic morphology with no focal lesions.  Significant interval increase in ascites when compared to prior exam which may account for patient's abdominal distension.  Hypodense air-fluid collection along the body of the pancreas which is slightly smaller when compared to prior CT.  Findings may relate to pancreatic necrosis with pancreatic pseudocysts with infected pseudocyst    Skin cancer     skin cancer    Sleep apnea 8/26/2015    Status post bariatric surgery 1/11/2016    Type 2 diabetes mellitus, with long-term current use of insulin 5/8/2015     Past Surgical History:   Procedure Laterality Date    ANGIOGRAM, CORONARY ARTERY Right 3/20/2019    Performed [facility] CATH LAB    ANGIOPLASTY      total x5 stents    Bypass graft study  3/20/2019    Performed [facility]que [facility] CATH LAB    COLONOSCOPY N/A 10/6/2015    Performed by Shekhar Richards MD at [facility] ENDO (2ND FLR)    CORONARY ARTERY BYPASS GRAFT  2017    x3    CYST REMOVAL      GASTRECTOMY      GASTRECTOMY-SLEEVE-LAPAROSCOPIC - 80159 N/A 12/22/2015    Performed by Micheal [facility]avicencio Jr., [facility] at [facility] OR 2ND FLR    KNEE ARTHROSCOPY      perianal surgery      perianal cyst removed    pleurx N/A 5/17/2019    Performed by [facility] OR 2ND FLR     Family History   Problem Relation Age of Onset    Cancer Mother     Cancer Father      Heart disease Father     Obesity Sister     Parkinsonism Brother     No Known Problems Paternal Grandmother     Cancer Paternal Grandfather     Cancer Brother     Diabetes Maternal Grandmother     Stroke Maternal Grandfather     Cirrhosis Neg Hx      Social History     Tobacco Use    Smoking status: Former Smoker     Packs/day: 2.00     Years: 30.00     Pack years: 60.00     Types: Cigarettes     Last attempt to quit: 2005     Years since quittin.3    Smokeless tobacco: Never Used   Substance Use Topics    Alcohol use: No     Comment: started ~, reports 1 shot daily, max 3 shots daily, vague about alcohol consumption. Last drink 2018    Drug use: No     Review of Systems   Constitutional: Negative for chills and fever.   HENT: Negative for sore throat.    Respiratory: Negative for shortness of breath.    Cardiovascular: Negative for chest pain.   Gastrointestinal: Negative for abdominal pain, nausea and vomiting.   Genitourinary: Negative for dysuria.   Musculoskeletal: Negative for back pain.   Skin: Positive for wound ( chronic to bilateral legs). Negative for color change and rash.   Neurological: Negative for weakness (Chronic).   Hematological: Does not bruise/bleed easily.   All other systems reviewed and are negative.      Physical Exam     Initial Vitals [19 1620]   BP Pulse Resp Temp SpO2   (!) 91/52 (!) 18 16 97.4 °F (36.3 °C) 100 %      MAP       --         Physical Exam  Nursing note reviewed.  Constitutional: He appears well-developed.   HENT:   Head: Normocephalic and atraumatic.   Eyes: Conjunctivae and EOM are normal. Pupils are equal, round, and reactive to light.   Neck: Normal range of motion.   Cardiovascular: Normal rate, regular rhythm, normal heart sounds and intact distal pulses. Exam reveals no gallop and no friction rub. No murmur heard.  Chronic bilateral lower extremity edema without complication.  Pulmonary/Chest: Breath sounds normal. No respiratory  distress. He has no wheezes. He has no rhonchi. He has no rales. He exhibits no tenderness. Decreased breath sounds to bilateral lower lobe secondary to body habitus.  Right upper chest wall PICC line with dressing in place.  No signs of infection  Abdominal: Soft. Bowel sounds are normal. He exhibits distension (Large abdominal girth noted-unable to assess for fluid wave secondary to obesity). He exhibits no mass. There is no tenderness. There is no rebound and no guarding.   Musculoskeletal: Normal range of motion. He exhibits no edema or tenderness.   Neurological: He is alert and oriented to person, place, and time. He has normal strength. GCS score is 15. GCS eye subscore is 4. GCS verbal subscore is 5. GCS motor subscore is 6.   Skin:   Hyperpigmentation consistent with venous stasis noted to bilateral lower extremities; no signs of infection     ED Course   Procedures  Labs Reviewed - No data to display        Medical Decision Making:   Initial Assessment:   63-year-old male with previous alcohol abuse, chronic pancreatitis, ascites, deconditioning, morbid obesity, diabetes, hypertension and multiple other comorbidities which presents the emergency room for admission to a skilled nursing facility.  Patient was in the emergency room earlier today for shortness of breath and was discharged.  Patient went to his appointment for his paracentesis and then was instructed by his sister come back to the emergency room to that he can be admitted.  Patient denies any new symptoms. He is no longer having shortness of breath.    Differential Diagnosis:   Deconditioning, need for home health, need for center, encounter for nursing home placement, encounter for skilled nursing facility placement  ED Management:  Patient examined and there are no new problems.  Sister was on the phone upon provider walking in room and stated that the primary care provider instructed them to come to the emergency room for admission.   Provider called the primary care's office and the medical assistant, Deyanira's stated that this was not the case and the patient was not told to come to the emergency room for admission. Deyanira stated that the insurance company advised the patient to come in for admission.  Patient and patient's sister advised of these findings and the sister became frustrated as she states it is very difficult for them to take care of him.  She states that the wife is currently taking care of her mother who recently had a stroke.  They are concerned that the patient is at risk for falls.  Patient was seen by me last week and the patient is in the same condition and this was expressed to the sister and the patient.  While provider understands the frustration of the patient and the sister, the patient does not currently meet in-patient rehab criteria as documented in EPIC.  Upon  trying to verify if the primary care provider ordered a new evaluation for the patient, the patient's wife showed up and agreed to take him home.  She states that she cares from him but that the sister is attempting to help her with his appointments and setting up therapy.  The wife and I had a very long discussion about the process that should will need to to place an order for the patient to get admitted to a nursing home or skilled nursing facility.  Wife also advised that the patient may not qualify for either of these facilities and that the patient had to be evaluated.  The wife was advised that we attempted to have the patient admitted for observation and that he did not meet criteria to be admitted to the hospital.  Patient is in agree it is to bring the patient home and continue to care for him but she asked that we contact Dr. Barajas's office for orders to help with the PICC line care.  Provider has placed a message to the office for this request.  Wife advised that although we request for the primary care provider to order the  services it does not mean that the provider will do so.  Sonal, the  will follow up in the morning on the request for PICC lien care and the evaluation by home health for further care.  Wife has been advised of all of today's events and has agreed to take the patient home.              Attending Attestation:     Physician Attestation Statement for NP/PA:   I discussed this assessment and plan of this patient with the NP/PA, but I did not personally examine the patient. The face to face encounter was performed by the NP/PA.                  ED Course as of Jun 11 1950 Tue Jun 11, 2019   1649 Pt states he is here to get admitted to a SNF    [AT]   1914 Spoke to wife regarding discharge- she is OK with taking him home- we discussed in length the process that needs to be taken to get her  the care he needs and she has voiced understanding. Sonal with the Social Work department will follow up with the home health  so that the patient can get the care he needs at home or to see if he qualifies to transition into SNF vs nursing home. The wife has asked that we contact Dr. Barajas's office and ask that he order the PICC line dressing changes. A message has been sent to the office for this request.     [AT]      ED Course User Index  [AT] VONDA Swift     Clinical Impression:       ICD-10-CM ICD-9-CM   1. Decreased mobility R26.89 781.99   2. Congestive heart failure, unspecified HF chronicity, unspecified heart failure type I50.9 428.0   3. Stage 3 chronic kidney disease N18.3 585.3   4. Other ascites R18.8 789.59   5. Morbid obesity with BMI of 50.0-59.9, adult E66.01 278.01    Z68.43 V85.43   6. Other chronic pancreatitis K86.1 577.1   7. Lymphedema of both lower extremities I89.0 457.1                                VONDA Swift  06/11/19 2001

## 2019-06-11 NOTE — TELEPHONE ENCOUNTER
----- Message from Alvarez Mark sent at 6/11/2019 12:22 PM CDT -----  Contact: Sister Nena [phone]  Urgent Request....    Patient would like to get medical advice.     Comments: Patient sister calling regarding would like to speak with office regarding needing a Peer to Peer, would like a call back to discuss. Sister stating patient was informed to not leave the hospital but was discharge. Sister Nena [phone]  Would like a call back asap      Please call an advise  Thank you

## 2019-06-11 NOTE — ED NOTES
".  Patient identifiers for Jian Arrieta 63 y.o. male checked and correct.  Chief Complaint   Patient presents with    Fall     "I'm tired of falling and my insurance said I needed to come here to get physical therapy. I also havent had my central line dressing changed since May 28th." Patient seems to need management of care. Patient repeatedly states "Someone needs to own it, my needs arent being met."      Past Medical History:   Diagnosis Date    Alcohol abuse     Anasarca 1/28/2019    Arthropathy associated with neurological disorder 9/2/2015    Atherosclerosis     Charcot foot due to diabetes mellitus     Chronic combined systolic and diastolic heart failure 01/29/2019 1-28-19 Left VentricleModerate decreased ejection fraction at 30%. Normal left ventricular cavity size. Normal wall thickness observed. Grade I (mild) left ventricular diastolic dysfunction consistent with impaired relaxation. Normal left atrial pressure. Moderate, global hypokinesis(see wall scoring diagram). Right VentricleNormal cavity size, wall thickness and ejection fraction. Wall motion n    Chronic pancreatitis 1/28/2019    Colon polyps     approx 5 yrs ago    Coronary artery disease due to calcified coronary lesion 05/08/2015    5 stents on ASA      Diabetic polyneuropathy associated with type 2 diabetes mellitus 9/2/2015    Diverticulosis 1/28/2019    DM type 2 with diabetic peripheral neuropathy 2/4/2019    Essential hypertension 1/28/2019    Former smoker 8/26/2015    Healed ulcer of left foot on examination 6/20/2017    Hydrocele     approx 1.5 yrs ago    Hypoalbuminemia 2/4/2019    Lymphedema of both lower extremities 1/29/2019    Mixed hyperlipidemia 5/8/2015    Morbid obesity with BMI of 50.0-59.9, adult 5/8/2015    Onychomycosis of multiple toenails with type 2 diabetes mellitus and peripheral neuropathy 6/20/2017    Perianal cyst     approx 2 yrs ago    Pseudocyst of pancreas 1/28/2019 1-28-19 " Liver has a cirrhotic morphology with no focal lesions.  Significant interval increase in ascites when compared to prior exam which may account for patient's abdominal distension.  Hypodense air-fluid collection along the body of the pancreas which is slightly smaller when compared to prior CT.  Findings may relate to pancreatic necrosis with pancreatic pseudocysts with infected pseudocyst    Skin cancer     skin cancer    Sleep apnea 8/26/2015    Status post bariatric surgery 1/11/2016    Type 2 diabetes mellitus, with long-term current use of insulin 5/8/2015     Allergies reported: Review of patient's allergies indicates:  No Known Allergies      LOC: Patient is awake, alert, and aware of environment with an appropriate affect. Patient is oriented x 3 and speaking appropriately.  APPEARANCE: Patient resting comfortably and in no acute distress. Patient is clean and well groomed, patient's clothing is properly fastened.  HEENT: Hard of hearing  SKIN: The skin is warm and dry. Patient has normal skin turgor and moist mucus membranes. Pt has multiple skin issues, skin is wrapped with dressings from home.   MUSKULOSKELETAL: Patient is moving all extremities well, no obvious deformities noted. Pulses intact. Pt reports frequent falls.   RESPIRATORY: Airway is open and patent. Respirations are spontaneous and non-labored with normal effort and rate, BBS=clear  CARDIAC: Patient has a normal rate and rhythm. Pt has bilateral lower extremity edema.   ABDOMEN: Pt reports generalized abdominal discomfort, scheduled for paracentesis this am.   NEUROLOGICAL: PERRL. Facial expression is symmetrical. Hand grasps are equal bilaterally. Normal sensation in all extremities when touched with finger.

## 2019-06-11 NOTE — ED PROVIDER NOTES
"Encounter Date: 6/11/2019       History     Chief Complaint   Patient presents with    Fall     "I'm tired of falling and my insurance said I needed to come here to get physical therapy. I also havent had my central line dressing changed since May 28th." Patient seems to need management of care. Patient repeatedly states "Someone needs to own it, my needs arent being met."      Patient is a 63-year-old white male with multiple comorbidities including CHF (EF 30%), diabetes mellitus with complications (Charcot foot, diabetic polyneuropathy), BLE lymphedema, recurrent ascites 2/2 liver disease vs. Pancreatic etiology, and CAD s/p PCI (x5) and CABG (2017) who presents to the ED for evaluation of recurrent falls and admission for PT/OT inpatient rehab.  Patient reports he was recently approved by his cardiologist to undergo a pancreatic surgery for further evaluation of his recurrent ascites.  He states in order to undergo this surgery he needs to improve his strength through physical therapy; however, his insurance does not approve outpatient PT.  He was informed by his primary care LPN to request PT inpatient rehab while in the hospital.  He also reports he is scheduled for a paracentesis through IR this morning at 10:30 a.m..  In addition, he expresses concern about his right chest wall Cheney catheter dressing, stating that it has not been changed in one week because "no one has taken ownership of his problems". He endorses chronic mid-epigastric abdominal pain and mild nausea. He also reports frequent falls. He denies fever/chills, chest pain, SOB, vomiting, bowel changes, dysuria, or focal weakness.     The history is provided by the patient.     Review of patient's allergies indicates:  No Known Allergies  Past Medical History:   Diagnosis Date    Alcohol abuse     Anasarca 1/28/2019    Arthropathy associated with neurological disorder 9/2/2015    Atherosclerosis     Charcot foot due to diabetes mellitus  "    Chronic combined systolic and diastolic heart failure 01/29/2019 1-28-19 Left VentricleModerate decreased ejection fraction at 30%. Normal left ventricular cavity size. Normal wall thickness observed. Grade I (mild) left ventricular diastolic dysfunction consistent with impaired relaxation. Normal left atrial pressure. Moderate, global hypokinesis(see wall scoring diagram). Right VentricleNormal cavity size, wall thickness and ejection fraction. Wall motion n    Chronic pancreatitis 1/28/2019    Colon polyps     approx 5 yrs ago    Coronary artery disease due to calcified coronary lesion 05/08/2015    5 stents on ASA      Diabetic polyneuropathy associated with type 2 diabetes mellitus 9/2/2015    Diverticulosis 1/28/2019    DM type 2 with diabetic peripheral neuropathy 2/4/2019    Essential hypertension 1/28/2019    Former smoker 8/26/2015    Healed ulcer of left foot on examination 6/20/2017    Hydrocele     approx 1.5 yrs ago    Hypoalbuminemia 2/4/2019    Lymphedema of both lower extremities 1/29/2019    Mixed hyperlipidemia 5/8/2015    Morbid obesity with BMI of 50.0-59.9, adult 5/8/2015    Onychomycosis of multiple toenails with type 2 diabetes mellitus and peripheral neuropathy 6/20/2017    Perianal cyst     approx 2 yrs ago    Pseudocyst of pancreas 1/28/2019 1-28-19 Liver has a cirrhotic morphology with no focal lesions.  Significant interval increase in ascites when compared to prior exam which may account for patient's abdominal distension.  Hypodense air-fluid collection along the body of the pancreas which is slightly smaller when compared to prior CT.  Findings may relate to pancreatic necrosis with pancreatic pseudocysts with infected pseudocyst    Skin cancer     skin cancer    Sleep apnea 8/26/2015    Status post bariatric surgery 1/11/2016    Type 2 diabetes mellitus, with long-term current use of insulin 5/8/2015     Past Surgical History:   Procedure Laterality Date     ANGIOGRAM, CORONARY ARTERY Right 3/20/2019    Per[facility] CATH LAB    ANGIOPLASTY      total x5 stents    Bypass graft study  3/20/2019    Performed by [facility]que [facility] at [facility] CATH LAB    COLONOSCOPY N/A 10/6/2015    Performed by Shekhar Richards MD at [facility] ENDO (2ND FLR)    CORONARY ARTERY BYPASS GRAFT  2017    x3    CYST REMOVAL      GASTRECTOMY      GASTRECTOMY-SLEEVE-LAPAROSCOPIC - 48631 N/A 2015    Performed by Micheal Villavicencio Jr., MD at [facility] OR 2ND FLR    KNEE ARTHROSCOPY      perianal surgery      perianal cyst removed    pleurx N/A 2019    Performed by [facility] Diagnostic Provider at [facility] OR 2ND FLR     Family History   Problem Relation Age of Onset    Cancer Mother     Cancer Father     Heart disease Father     Obesity Sister     Parkinsonism Brother     No Known Problems Paternal Grandmother     Cancer Paternal Grandfather     Cancer Brother     Diabetes Maternal Grandmother     Stroke Maternal Grandfather     Cirrhosis Neg Hx      Social History     Tobacco Use    Smoking status: Former Smoker     Packs/day: 2.00     Years: 30.00     Pack years: 60.00     Types: Cigarettes     Last attempt to quit: 2005     Years since quittin.3    Smokeless tobacco: Never Used   Substance Use Topics    Alcohol use: No     Comment: started ~, reports 1 shot daily, max 3 shots daily, vague about alcohol consumption. Last drink 2018    Drug use: No     Review of Systems   Constitutional: Negative for chills and fever.   HENT: Negative for sore throat.    Eyes: Negative for visual disturbance.   Respiratory: Negative for shortness of breath.    Cardiovascular: Positive for leg swelling (chronic). Negative for chest pain.   Gastrointestinal: Positive for abdominal pain (chronic) and nausea. Negative for constipation, diarrhea and vomiting.   Genitourinary: Negative for dysuria.   Musculoskeletal: Negative for myalgias.   Skin: Negative  for wound.   Neurological: Negative for weakness and headaches.   Psychiatric/Behavioral: Negative for dysphoric mood.       Physical Exam     Initial Vitals [06/11/19 0801]   BP Pulse Resp Temp SpO2   (!) 92/57 86 16 98.3 °F (36.8 °C) 100 %      MAP       --         Physical Exam    Nursing note and vitals reviewed.  Constitutional: He appears well-developed and well-nourished. He is not diaphoretic. He is Obese . No distress.   HENT:   Head: Normocephalic and atraumatic.   Mouth/Throat: Oropharynx is clear and moist. No oropharyngeal exudate.   Eyes: Conjunctivae and EOM are normal. Pupils are equal, round, and reactive to light.   Neck: Normal range of motion. Neck supple.   Cardiovascular: Normal rate, regular rhythm, normal heart sounds and intact distal pulses.   Pulmonary/Chest: Breath sounds normal. No respiratory distress. He has no wheezes. He has no rhonchi. He has no rales.   Abdominal: There is no tenderness. There is no rebound and no guarding.   Large abdomen, soft and nontender.   Musculoskeletal: Normal range of motion.   Neurological: He is alert and oriented to person, place, and time. GCS score is 15. GCS eye subscore is 4. GCS verbal subscore is 5. GCS motor subscore is 6.   Skin:   Hyperpigmentation consistent with venous stasis noted to bilateral lower extremities; no signs of infection.   Psychiatric: He has a normal mood and affect. Thought content normal.         ED Course   Procedures  Labs Reviewed   CBC W/ AUTO DIFFERENTIAL - Abnormal; Notable for the following components:       Result Value    RBC 3.11 (*)     Hemoglobin 9.0 (*)     Hematocrit 27.3 (*)     RDW 14.8 (*)     Gran% 73.3 (*)     All other components within normal limits   COMPREHENSIVE METABOLIC PANEL - Abnormal; Notable for the following components:    Sodium 130 (*)     Potassium 3.0 (*)     CO2 21 (*)     Glucose 290 (*)     BUN, Bld 51 (*)     Creatinine 2.7 (*)     Calcium 7.2 (*)     Total Protein 4.5 (*)     Albumin  1.6 (*)     Alkaline Phosphatase 141 (*)     Anion Gap 7 (*)     eGFR if  27.5 (*)     eGFR if non  23.8 (*)     All other components within normal limits   LIPASE - Abnormal; Notable for the following components:    Lipase <3 (*)     All other components within normal limits   POCT GLUCOSE - Abnormal; Notable for the following components:    POCT Glucose 278 (*)     All other components within normal limits   URINALYSIS, REFLEX TO URINE CULTURE    Narrative:     Preferred Collection Type->Urine, Clean Catch          Imaging Results    None          Medical Decision Making:   History:   Old Medical Records: I decided to obtain old medical records.  Initial Assessment:   Patient is a 63-year-old white male with multiple comorbidities including CHF (EF 30%), diabetes mellitus with complications (Charcot foot, diabetic polyneuropathy), BLE lymphedema, recurrent ascites 2/2 liver disease vs. Pancreatic etiology, and CAD s/p PCI (x5) and CABG (2017) who presents to the ED for evaluation of recurrent falls and admission for PT/OT inpatient rehab. PE reveals a non-toxic, afebrile, obese male in no acute distress. BP 92/57. SpO2 100% on RA.   Differential Diagnosis:   DDx includes but is not limited to: chronic disease, lymphedema, electrolyte derangement, UTI.  Clinical Tests:   Lab Tests: Ordered and Reviewed  ED Management:  Patient instructed to come to the ED to obtain inpatient admission and PT/OT inpatient rehab. He denies new or worsening symptoms. Endorses chronic abdominal pain.     Will obtain basic labs and discuss case with Hospital Medicine. Will give Zofran 8 mg IV for nausea.    Patient refused Zofran, states nausea no longer present.    Discussed case with , who contacted social work for assistance of care. MACARENA contacted Velasquez with [facility] who states that in order to obtain rehab placement, his PCP would need to obtain necessary documentation from patient's current  Home Health agency and send out a referral. At that point, [facility] would go to the patient's home and evaluate if patient's meets rehab criteria. Velasquez at [facility] states that their Home Health liason contacted both the patient and his PCP while in the ED to inform them of this process (see Plan of Care note).     Labs significant for low, but stable H&H 9.0/27.3. CMP remarkable for Na 130 (consistent with ascites), K 9.0, Glu 290, BUN 51, Cr 2.7 (renal function at baseline). Lipase WNL. UA without signs of infection. Patient given KCL 40 mEq PO for hypokalemia.    As patient's laboratory results are at baseline without new/worsening complaints or abnormal PE findings, patient does not meet inpatient criteria at this time. I had a lengthy discussion with both the patient and his sister through the phone discussing the appropriate process for obtaining inpatient rehab services as advised by social work and [facility]. I also contacted IR to have patient's paracentesis procedure postponed to a later time today so that he is still able have the procedure done. Patient's Cheney catheter dressing was changed as requested. He was advised to communicate with his PCP for any further issues. They expressed understanding. He was given ED return precautions. I have discussed patient case with my supervisory physician, who is in agreement with my assessment and plan.    Other:   I have discussed this case with another health care provider.       <> Summary of the Discussion: Social Work, [facility], Hospital Medicine                      Clinical Impression:       ICD-10-CM ICD-9-CM   1. Frequent falls R29.6 V15.88   2. Other ascites R18.8 789.59   3. Hypokalemia E87.6 276.8         Disposition:   Disposition: Discharged  Condition: Stable                        Ifrah Dolan PA-C  06/11/19 3383

## 2019-06-11 NOTE — TELEPHONE ENCOUNTER
[facility] , Erin Moura told patient/ Nena (sister) to go to ER be re-admitted for observation and to be placed into a skilled nursing facility because he has no one to take care of him and is not safe.

## 2019-06-11 NOTE — ED NOTES
Patient informed of the need for a urine sample, patient states he just went to the bathroom and he isnt worried about that right now

## 2019-06-11 NOTE — ED NOTES
Pt's sister, Nena, on speaker phone with pt states that the pt was sent here to be admitted to a skilled nursing facility from the ED.  Earlier note reveals  spoke with pt and ED PA stating that the pt would have to be admitted by a PCP, not through the ED.  Pt and sister both state that they did not speak to a  today.  They both state that he is not going home, he cannot take care of himself.

## 2019-06-11 NOTE — PLAN OF CARE
10AM   (MACRAENA) received call from Patient's (Pt) PA who reported that Pt and Pt's PCP would like Pt to discharge from the ED to [facility]; PA reported that Pt doesn't have any acute needs at this time.     SW called Velasquez with [facility] (p:[phone]) to discuss Pt's case. Velasquez reported that he would meet the Pt at bedside to explain the intake process and discuss the barriers to Rehab placement from an outpatient setting. It is the SW's understanding that Pt's PCP would need to obtain necessary documentation from Pt's current Home Health agency and then send out a referral. After that process has been initiated, [facility] would go to Pt's home and evaluate if Pt meets rehab criteria.      UPDATE 11:59 AM  SW called Velasquez at [facility] who reported that their Home Health liaison reached out to the Pt and his PCP. Velasquez reported that they are following the Pt and will answer all of his questions regarding this issue. Velasquez reported that they do not see any need to address this issue further from the ED as Pt does not have any acute needs at this time. Velasquez will reach out to SW if they have any additional questions.     SW will continue to follow and offer support as needed.     Chelsea Fonseca, SW  [facility]  [phone]

## 2019-06-11 NOTE — ED TRIAGE NOTES
"Pt presents with c/o multiple recent falls, states, "I can't hardly walk 5 steps". Pt reports he has a paracentesis scheduled today at 1030. Pt requesting to be admitted to Rehab for a week. Pt has a central line, pt states, this line and dressing needs flushed and changed. Pt reports abdominal pain 4/10.    "

## 2019-06-11 NOTE — ED NOTES
PICC line dressing change to RCW via sterile technique.   Biopatch & dressing applied.   Patient tolerated well.   Dressing dated.   Sterile technique maintained throughout procedure.

## 2019-06-11 NOTE — TELEPHONE ENCOUNTER
FYI Nurse practioner called from er at 4:45pm and said he still does not qualify for inpatient rehab and family and patient was told that earlier.    Insurance will not cover in patient rehab.    I put a call into [facility] / juan [phone] and I gave her verbal order for  to go out to house to help arrange care.     Thanks tracie

## 2019-06-11 NOTE — DISCHARGE INSTRUCTIONS
Future Appointments   Date Time Provider Department Center   6/14/2019 10:30 AM [facility] IR1-124/125 [facility] IR [facility]   6/18/2019 10:30 AM [facility] IR1-124/125 [facility]   6/21/2019 10:30 AM [facility] IR1-124/125 [facility]   6/27/2019  8:30 AM [facility] GASTRO [facility]   8/2/2019 11:20 AM Leon Barajas DO [facility] IM [place]   8/26/2019 11:00 AM Jaime Carney III, MD [facility] CARDIO [place]     Please attend your paracentesis appt as scheduled.   Follow-up with your PCP.  Return to the ED for any concerning symptoms.

## 2019-06-12 ENCOUNTER — TELEPHONE (OUTPATIENT)
Dept: HEPATOLOGY | Facility: CLINIC | Age: 64
End: 2019-06-12

## 2019-06-12 ENCOUNTER — TELEPHONE (OUTPATIENT)
Dept: INTERNAL MEDICINE | Facility: CLINIC | Age: 64
End: 2019-06-12

## 2019-06-12 DIAGNOSIS — Z45.2 PICC (PERIPHERALLY INSERTED CENTRAL CATHETER) REMOVAL: Primary | ICD-10-CM

## 2019-06-12 NOTE — TELEPHONE ENCOUNTER
Spoke with patient sister re: PICC line . Stated wants an order for Infusion company to give supplies to flush and change bandages. I will consult my manager re: this matter

## 2019-06-12 NOTE — TELEPHONE ENCOUNTER
----- Message from VONDA Swift sent at 6/11/2019  7:58 PM CDT -----  Patient and his wife are requesting home health orders for PICC line care.

## 2019-06-12 NOTE — TELEPHONE ENCOUNTER
----- Message from Isra Carbajal sent at 6/12/2019  3:12 PM CDT -----  Contact: Luisito/[facility]  Attn Justine    Please call Luisito at x [phone]    Has questions about the tunnel pic line that no one is managing     Thank you

## 2019-06-12 NOTE — TELEPHONE ENCOUNTER
Recommend PICC line removal as patient does not need albumin infusion during paracentesis as long as drainage < 5 L.

## 2019-06-12 NOTE — TELEPHONE ENCOUNTER
----- Message from Elroy Gong sent at 6/12/2019  3:43 PM CDT -----  Contact: Dr Burger   Dr. Burger with [facility] called in need referral to get patient in to skilled nursing facility please advise

## 2019-06-12 NOTE — TELEPHONE ENCOUNTER
Spoke with patient and patient's sister Nena regarding the home health orders for  PICC line care. He said the PICC line was placed in IR prior to a paracentesis due to no peripheral IV access and patient required albumin infusion post paracentesis.  Patient states that he needs orders for dressing changes and flush for home health to manage.  Message will be sent to Dr. Christensen for follow up, patient and sister verbalized understanding.

## 2019-06-12 NOTE — TELEPHONE ENCOUNTER
Jo was given information re:patient, having a garcía   and the IR was ordered by Dr. Christensen see note       5/9/2019  [facility] - Hepatology Dr. Christensen appointment

## 2019-06-12 NOTE — TELEPHONE ENCOUNTER
----- Message from Henny Saucedo sent at 6/12/2019 10:45 AM CDT -----  Contact: Jo-Nurse coordinator   Jo states no supply or orders have been set up for this patient's pic line. She would like orders for an infusion company for pic line care and supplies. Orders also need to be sent to [facility]. Please call & advise

## 2019-06-13 ENCOUNTER — DOCUMENTATION ONLY (OUTPATIENT)
Dept: HEPATOLOGY | Facility: CLINIC | Age: 64
End: 2019-06-13

## 2019-06-13 ENCOUNTER — TELEPHONE (OUTPATIENT)
Dept: HEPATOLOGY | Facility: CLINIC | Age: 64
End: 2019-06-13

## 2019-06-13 ENCOUNTER — TELEPHONE (OUTPATIENT)
Dept: NEUROLOGY | Facility: HOSPITAL | Age: 64
End: 2019-06-13

## 2019-06-13 ENCOUNTER — TELEPHONE (OUTPATIENT)
Dept: TRANSPLANT | Facility: CLINIC | Age: 64
End: 2019-06-13

## 2019-06-13 NOTE — TELEPHONE ENCOUNTER
"Spoke with patient and sister Nena to schedule a follow up appointment with Dr. Christensen for Friday 6/14/19 at 8:30 am in Hepatology clinic. Patient stated "you are getting ready to dump me" replied to patient absolutely not, that is not the intent of the appointment. This is an opportunity for you to discuss with Dr. Christensen your current treatment plan and for Dr. Christensen to answer any questions that you might have.  Patient and sister Nena verbalized understanding and agreed to the appointment.     "

## 2019-06-13 NOTE — PROGRESS NOTES
Spoke with Yamini with home health regarding Dr. Christensen's recommendation to remove PICC line, an order has been entered in the system for IR to remove . Verbalized understanding.

## 2019-06-13 NOTE — TELEPHONE ENCOUNTER
Called patient and sister Nena this morning to let them know of Dr. Christensen's recommendation to have the PICC line removed as he has reviewed patients last paracentesis visits and he has not met criteria for albumin due to small volume removal less than 5 L. Patient stated he had a copy of the protocol.  Patient became upset because he didn't want the PICC line removed because he does not have any other IV access.  Recommended for patient to speak to the doctor regarding other options going forward, patient's scheduled to come in for a paracentesis on Friday 6/14/19.

## 2019-06-13 NOTE — TELEPHONE ENCOUNTER
Returned pts sister Nenajohnathan. Nena states pt has biweekly paracentesis for ascites of unknown origin. Clinic appt scheduled for Tuesday. Sister verbalizes understanding.

## 2019-06-13 NOTE — TELEPHONE ENCOUNTER
I called dr mcginnis back , he is with patients insurance.  When he called this am I was on phone with hospital  trying to getting information.  They routed me to [employer] and it routed me to [facility] [phone] , I left detailed msg but doubt anyone will call us back.    I told dr mcginnis we cannot order skilled nursing-  And he was aware of that.  I told him we are not sure why hospital feels he does not qualify for skilled care.  I told him the history and how 6/11 hospital er turned him away twice and called us stating he didn't qualify.  I stated to er that patient cannot take care of himself at home and that didn't change things.    We ordered a  on 6/11 5pm to go out to house to assess situation and make recommendations for care.  I told him we don't know when they will be going or if they already went.  We have had no report.  I gave him home health nurse [facility] phone number so he could call for an update.   ([facility] [phone] ,)      He feels patient cannot take care of himself at home and should be in around clock care facility.    Dr bajwa is aware of this phone call and feels we have done all we can to help.  He has only seen this patient once and patient has not returned since in hospital.

## 2019-06-13 NOTE — TELEPHONE ENCOUNTER
----- Message from Loyda LACYSedrick Copeland sent at 6/12/2019  9:55 AM CDT -----  Contact: [phone] Nena RAMIRES  New patient - Who called: Nena Jackman sister      Referred by: Dr. Arizmendi    Why do you need to be seen (diagnosis)? Pancreatitis     Which doctor are you requesting? Dr. Rowe    You may contact patient back to schedule at:  [phone] Nena

## 2019-06-14 ENCOUNTER — TELEPHONE (OUTPATIENT)
Dept: INTERNAL MEDICINE | Facility: CLINIC | Age: 64
End: 2019-06-14

## 2019-06-14 ENCOUNTER — HOSPITAL ENCOUNTER (OUTPATIENT)
Dept: INTERVENTIONAL RADIOLOGY/VASCULAR | Facility: HOSPITAL | Age: 64
Discharge: HOME | End: 2019-06-14
Attending: HOSPITALIST
Payer: MEDICARE

## 2019-06-14 ENCOUNTER — OFFICE VISIT (OUTPATIENT)
Dept: HEPATOLOGY | Facility: CLINIC | Age: 64
End: 2019-06-14
Payer: COMMERCIAL

## 2019-06-14 VITALS
SYSTOLIC BLOOD PRESSURE: 128 MMHG | OXYGEN SATURATION: 100 % | HEART RATE: 84 BPM | RESPIRATION RATE: 16 BRPM | DIASTOLIC BLOOD PRESSURE: 80 MMHG

## 2019-06-14 VITALS
DIASTOLIC BLOOD PRESSURE: 63 MMHG | TEMPERATURE: 98 F | HEART RATE: 92 BPM | WEIGHT: 280.88 LBS | HEIGHT: 67 IN | SYSTOLIC BLOOD PRESSURE: 117 MMHG | RESPIRATION RATE: 12 BRPM | BODY MASS INDEX: 44.08 KG/M2

## 2019-06-14 DIAGNOSIS — R18.8 OTHER ASCITES: ICD-10-CM

## 2019-06-14 DIAGNOSIS — R18.8 ASCITES OF LIVER: Primary | ICD-10-CM

## 2019-06-14 DIAGNOSIS — R18.8 OTHER ASCITES: Primary | ICD-10-CM

## 2019-06-14 LAB
ALBUMIN FLD-MCNC: 0.5 G/DL
APPEARANCE FLD: NORMAL
BODY FLD TYPE: NORMAL
COLOR FLD: NORMAL
LYMPHOCYTES NFR FLD MANUAL: 53 %
MESOTHL CELL NFR FLD MANUAL: 2 %
MONOS+MACROS NFR FLD MANUAL: 39 %
NEUTROPHILS NFR FLD MANUAL: 6 %
PROT FLD-MCNC: 1 G/DL
SPECIMEN SOURCE: NORMAL
SPECIMEN SOURCE: NORMAL
WBC # FLD: 144 /CU MM

## 2019-06-14 PROCEDURE — 84157 ASSAY OF PROTEIN OTHER: CPT

## 2019-06-14 PROCEDURE — 49083 ABD PARACENTESIS W/IMAGING: CPT

## 2019-06-14 PROCEDURE — 99499 NO LOS: ICD-10-PCS | Mod: S$GLB,,, | Performed by: INTERNAL MEDICINE

## 2019-06-14 PROCEDURE — 82042 OTHER SOURCE ALBUMIN QUAN EA: CPT

## 2019-06-14 PROCEDURE — 99499 UNLISTED E&M SERVICE: CPT | Mod: S$GLB,,, | Performed by: INTERNAL MEDICINE

## 2019-06-14 PROCEDURE — 99999 PR PBB SHADOW E&M-EST. PATIENT-LVL III: CPT | Mod: PBBFAC,,, | Performed by: INTERNAL MEDICINE

## 2019-06-14 PROCEDURE — 49083 IR PARACENTESIS WITH IMAGING: ICD-10-PCS | Mod: ,,, | Performed by: NURSE PRACTITIONER

## 2019-06-14 PROCEDURE — 99999 PR PBB SHADOW E&M-EST. PATIENT-LVL III: ICD-10-PCS | Mod: PBBFAC,,, | Performed by: INTERNAL MEDICINE

## 2019-06-14 PROCEDURE — 49083 ABD PARACENTESIS W/IMAGING: CPT | Mod: ,,, | Performed by: NURSE PRACTITIONER

## 2019-06-14 PROCEDURE — 89051 BODY FLUID CELL COUNT: CPT

## 2019-06-14 NOTE — TELEPHONE ENCOUNTER
----- Message from Fabiana Roth sent at 6/14/2019  1:23 PM CDT -----  Contact: Fely Downing Infection control [phone]  Nurse from [facility] is returning your call that was made to kira regarding this patient.

## 2019-06-14 NOTE — PROGRESS NOTES
Pt arrived MPU Bed 4 for paracentesis. No acute distress noted. VSS. Labs, orders and consent reviewed. DNP at bedside.

## 2019-06-14 NOTE — PROGRESS NOTES
Paracentesis complete. 1600 mLs peritoneal fluid drained. Pt tolerated well. Dressing to abdomen clean, dry, and intact. VSS. Specimens sent per lab order. Pt acknowledged understanding of discharge instructions. Pt discharged per unit and hospital protocol via wheel chair with transport staff.

## 2019-06-14 NOTE — TELEPHONE ENCOUNTER
I spoke to  nate ellis.    Patient was discharged because he did well with therapy and was independent and didn't want assistance or a walker to help with ambulation.   He did want a motorized  Scooter and was told he would not qualify.    They had nothing to show he would be a risk at home if discharged or that he would qualify for skilled nursing care.    She will fax us some notes to review about his care

## 2019-06-14 NOTE — H&P
Radiology History & Physical      SUBJECTIVE:     Chief Complaint: abdominal distention    History of Present Illness:  Jian Arrieta is a 63 y.o. male who presents for evaluation of ascites  Past Medical History:   Diagnosis Date    Alcohol abuse     Anasarca 1/28/2019    Arthropathy associated with neurological disorder 9/2/2015    Atherosclerosis     Charcot foot due to diabetes mellitus     Chronic combined systolic and diastolic heart failure 01/29/2019 1-28-19 Left VentricleModerate decreased ejection fraction at 30%. Normal left ventricular cavity size. Normal wall thickness observed. Grade I (mild) left ventricular diastolic dysfunction consistent with impaired relaxation. Normal left atrial pressure. Moderate, global hypokinesis(see wall scoring diagram). Right VentricleNormal cavity size, wall thickness and ejection fraction. Wall motion n    Chronic pancreatitis 1/28/2019    Colon polyps     approx 5 yrs ago    Coronary artery disease due to calcified coronary lesion 05/08/2015    5 stents on ASA      Diabetic polyneuropathy associated with type 2 diabetes mellitus 9/2/2015    Diverticulosis 1/28/2019    DM type 2 with diabetic peripheral neuropathy 2/4/2019    Essential hypertension 1/28/2019    Former smoker 8/26/2015    Healed ulcer of left foot on examination 6/20/2017    Hydrocele     approx 1.5 yrs ago    Hypoalbuminemia 2/4/2019    Lymphedema of both lower extremities 1/29/2019    Mixed hyperlipidemia 5/8/2015    Morbid obesity with BMI of 50.0-59.9, adult 5/8/2015    Onychomycosis of multiple toenails with type 2 diabetes mellitus and peripheral neuropathy 6/20/2017    Perianal cyst     approx 2 yrs ago    Pseudocyst of pancreas 1/28/2019 1-28-19 Liver has a cirrhotic morphology with no focal lesions.  Significant interval increase in ascites when compared to prior exam which may account for patient's abdominal distension.  Hypodense air-fluid collection along the  body of the pancreas which is slightly smaller when compared to prior CT.  Findings may relate to pancreatic necrosis with pancreatic pseudocysts with infected pseudocyst    Skin cancer     skin cancer    Sleep apnea 8/26/2015    Status post bariatric surgery 1/11/2016    Type 2 diabetes mellitus, with long-term current use of insulin 5/8/2015     Past Surgical History:   Procedure Laterality Date    ANGIOGRAM, CORONARY ARTERY Right 3/20/2019    [facility]    ANGIOPLASTY      total x5 stents    Bypass graft study  3/20/2019    [facility] CATH [facility]    COLONOSCOPY N/A 10/6/2015    [facility] by Shekhar Richards [facility] ENDO (2ND FLR)    CORONARY ARTERY BYPASS GRAFT  2017    x3    CYST REMOVAL      GASTRECTOMY      GASTRECTOMY-SLEEVE-LAPAROSCOPIC - 80606 N/A 12/22/2015    [facility]avicencio Jr[facility] OR 2ND FLR    KNEE ARTHROSCOPY      perianal surgery      perianal cyst removed    pleurx N/A 5/17/2019    [facility] by [facility] OR 2ND FLR       Home Meds:   Prior to Admission medications    Medication Sig Start Date End Date Taking? Authorizing Provider   apixaban (ELIQUIS) 5 mg Tab Take 1 tablet (5 mg total) by mouth 2 (two) times daily. 3/20/19   Lorie Higuera MD   atorvastatin (LIPITOR) 40 MG tablet Take 1 tablet (40 mg total) by mouth once daily. 2/4/19 2/4/20  Alfonso Mahmood MD   clopidogrel (PLAVIX) 75 mg tablet Take 1 tablet (75 mg total) by mouth once daily. 3/21/19 3/20/20  Lorie Higuera MD   cyanocobalamin, vitamin B-12, 500 mcg Subl Place 1 tablet under the tongue every Monday.     Historical Provider, MD   furosemide (LASIX) 80 MG tablet Take 1 tablet (80 mg total) by mouth 2 (two) times daily. 5/29/19 5/28/20  Roly Caba MD   insulin aspart U-100 (NOVOLOG) 100 unit/mL injection Inject 4 Units into the skin 3 (three) times daily before meals.    Historical Provider, MD    insulin detemir (LEVEMIR FLEXPEN SUBQ) Inject 12 Units into the skin once daily.     Historical Provider, MD   lipase-protease-amylase (CREON) 36,000-114,000- 180,000 unit CpDR Take 1 capsule by mouth 3 (three) times daily. 2/4/19   Alfonso Mahmood MD   metoprolol succinate (TOPROL-XL) 50 MG 24 hr tablet Take 1 tablet (50 mg total) by mouth once daily. 5/29/19 5/28/20  Roly Caba MD   omeprazole (PRILOSEC) 40 MG capsule Take 40 mg by mouth once daily. 2/18/19   Historical Provider, MD   ONETOUCH ULTRA TEST Strp 4 (four) times daily. Use to test blood sugar four times a day. 10/15/15   Historical Provider, MD   tamsulosin (FLOMAX) 0.4 mg Cap Take 1 capsule by mouth once daily. Pt has not started taking as of 2/27/19. 2/5/19   Historical Provider, MD     Anticoagulants/Antiplatelets: no anticoagulation    Allergies: Review of patient's allergies indicates:  No Known Allergies  Sedation History:  no adverse reactions    Review of Systems:   Hematological: no known coagulopathies  Respiratory: no shortness of breath  Cardiovascular: no chest pain  Gastrointestinal: no abdominal pain  Genito-Urinary: no dysuria  Musculoskeletal: negative  Neurological: no TIA or stroke symptoms         OBJECTIVE:     Vital Signs (Most Recent)       Physical Exam:  ASA: 2  Mallampati: na    General: no acute distress  Mental Status: alert and oriented to person, place and time  HEENT: normocephalic, atraumatic  Chest: unlabored breathing  Heart: regular heart rate  Abdomen: distended  Extremity: moves all extremities    ASSESSMENT/PLAN:     Sedation Plan: local anesthesia  Patient will undergo US guided paracentesis

## 2019-06-17 NOTE — PROGRESS NOTES
Patient presented to discuss the management of ascites and his PICC line. Not an actual visit No charge.

## 2019-06-18 ENCOUNTER — OFFICE VISIT (OUTPATIENT)
Dept: NEUROLOGY | Facility: HOSPITAL | Age: 64
End: 2019-06-18
Attending: SURGERY
Payer: COMMERCIAL

## 2019-06-18 ENCOUNTER — HOSPITAL ENCOUNTER (OUTPATIENT)
Dept: RADIOLOGY | Facility: HOSPITAL | Age: 64
Discharge: HOME | End: 2019-06-18
Attending: SURGERY
Payer: COMMERCIAL

## 2019-06-18 VITALS
TEMPERATURE: 96 F | SYSTOLIC BLOOD PRESSURE: 93 MMHG | DIASTOLIC BLOOD PRESSURE: 63 MMHG | BODY MASS INDEX: 43.95 KG/M2 | HEART RATE: 84 BPM | WEIGHT: 280 LBS | HEIGHT: 67 IN

## 2019-06-18 DIAGNOSIS — R18.8 OTHER ASCITES: Primary | ICD-10-CM

## 2019-06-18 DIAGNOSIS — I89.0 LYMPHEDEMA OF BOTH LOWER EXTREMITIES: ICD-10-CM

## 2019-06-18 DIAGNOSIS — E66.01 MORBID OBESITY WITH BMI OF 50.0-59.9, ADULT: ICD-10-CM

## 2019-06-18 DIAGNOSIS — R18.8 ASCITES OF LIVER: ICD-10-CM

## 2019-06-18 DIAGNOSIS — K86.1 CHRONIC PANCREATITIS, UNSPECIFIED PANCREATITIS TYPE: ICD-10-CM

## 2019-06-18 DIAGNOSIS — I87.2 VENOUS STASIS DERMATITIS OF BOTH LOWER EXTREMITIES: ICD-10-CM

## 2019-06-18 PROCEDURE — 91200 LIVER ELASTOGRAPHY: CPT | Mod: 26,,, | Performed by: RADIOLOGY

## 2019-06-18 PROCEDURE — 99214 OFFICE O/P EST MOD 30 MIN: CPT | Mod: 25 | Performed by: SURGERY

## 2019-06-18 PROCEDURE — 91200 US ELASTOGRAPHY LIVER: ICD-10-PCS | Mod: 26,,, | Performed by: RADIOLOGY

## 2019-06-18 PROCEDURE — 91200 LIVER ELASTOGRAPHY: CPT | Mod: TC

## 2019-06-18 RX ORDER — SPIRONOLACTONE 25 MG/1
25 TABLET ORAL 2 TIMES DAILY
COMMUNITY
End: 2019-08-26

## 2019-06-18 NOTE — PROGRESS NOTES
"NOLANETS:  [facility]  A collaboration between [facility] and [facility]      PATIENT: Jian Arrieta  MRN: [id number]  DATE: 6/18/2019    Subjective:      Chief Complaint: Pancreatitis (ascites)      Vitals:   Vitals:    06/18/19 1033   BP: 93/63   Pulse: 84   Temp: 96.1 °F (35.6 °C)   TempSrc: Oral   Weight: 127 kg (280 lb)   Height: 5' 7" (1.702 m)        Karnofsky Score:     Diagnosis:   1. Other ascites    2. Chronic pancreatitis, unspecified pancreatitis type    3. Lymphedema of both lower extremities    4. Morbid obesity with BMI of 50.0-59.9, adult    5. Venous stasis dermatitis of both lower extremities         Oncologic History: NA    Interval History: referre by Dr reina for recurrent ascites requiring large volume paracentesis twice/week. Stage I hepatic fibrosis.  Poor response to diuretics. Patient admits to non compliance with salt and fluid restriction.  Ascites began after severe episode pancreatitis last  Sept, and later pseudocyst.  S/p gastric sleeve.  CRI      CAD s/p PCI (x5) with subsequent CABG x3v (~2017 at [facility]; LIMA-LAD [patent], SVG-OM [patent], SVG-RCA[occluded; L->R collaterals from LAD to PDA]), HLD, DM2, BERHANE on CPAP, R knee OA, Charcot foot, normocytic anemia, recurrent ascites (CT with findings of cirrhosis but biopsy negative; has large pancreatic pseudocyst)  Surgical: sleeve gastrectomy, knee, perianal cyst removal, CABG   Family: DM, Parkinson's, cancers, stroke  Social: former smoker of 2 PPD x30y (quit 2005)        Past Medical History:  Past Medical History:   Diagnosis Date    Alcohol abuse     Anasarca 1/28/2019    Arthropathy associated with neurological disorder 9/2/2015    Atherosclerosis     Charcot foot due to diabetes mellitus     Chronic combined systolic and diastolic heart failure 01/29/2019 1-28-19 Left VentricleModerate decreased ejection fraction at 30%. Normal left " ventricular cavity size. Normal wall thickness observed. Grade I (mild) left ventricular diastolic dysfunction consistent with impaired relaxation. Normal left atrial pressure. Moderate, global hypokinesis(see wall scoring diagram). Right VentricleNormal cavity size, wall thickness and ejection fraction. Wall motion n    Chronic pancreatitis 1/28/2019    Colon polyps     approx 5 yrs ago    Coronary artery disease due to calcified coronary lesion 05/08/2015    5 stents on ASA      Diabetic polyneuropathy associated with type 2 diabetes mellitus 9/2/2015    Diverticulosis 1/28/2019    DM type 2 with diabetic peripheral neuropathy 2/4/2019    Essential hypertension 1/28/2019    Former smoker 8/26/2015    Healed ulcer of left foot on examination 6/20/2017    Hydrocele     approx 1.5 yrs ago    Hypoalbuminemia 2/4/2019    Lymphedema of both lower extremities 1/29/2019    Mixed hyperlipidemia 5/8/2015    Morbid obesity with BMI of 50.0-59.9, adult 5/8/2015    Onychomycosis of multiple toenails with type 2 diabetes mellitus and peripheral neuropathy 6/20/2017    Perianal cyst     approx 2 yrs ago    Pseudocyst of pancreas 1/28/2019 1-28-19 Liver has a cirrhotic morphology with no focal lesions.  Significant interval increase in ascites when compared to prior exam which may account for patient's abdominal distension.  Hypodense air-fluid collection along the body of the pancreas which is slightly smaller when compared to prior CT.  Findings may relate to pancreatic necrosis with pancreatic pseudocysts with infected pseudocyst    Skin cancer     skin cancer    Sleep apnea 8/26/2015    Status post bariatric surgery 1/11/2016    Type 2 diabetes mellitus, with long-term current use of insulin 5/8/2015       Past Surgical History:  Past Surgical History:   Procedure Laterality Date    ANGIOGRAM, CORONARY ARTERY Right 3/20/2019    [facility] CATH [facility]    ANGIOPLASTY       total x5 stents    Bypass graft study  3/20/2019    Performed by [facility] at [facility] CATH LAB    COLONOSCOPY N/A 10/6/2015    Performed by Shekhar Richards MD at [facility] ENDO (2ND FLR)    CORONARY ARTERY BYPASS GRAFT  2017    x3    CYST REMOVAL      GASTRECTOMY      GASTRECTOMY-SLEEVE-LAPAROSCOPIC - 88053 N/A 12/22/2015    Performed by Micheal Villavicencio Jr., MD at [facility] OR 2ND FLR    KNEE ARTHROSCOPY      perianal surgery      perianal cyst removed    pleurx N/A 5/17/2019    Performed by [facility] Diagnostic Provider at [facility] OR 2ND FLR       Family History:  Family History   Problem Relation Age of Onset    Cancer Mother     Cancer Father     Heart disease Father     Obesity Sister     Parkinsonism Brother     No Known Problems Paternal Grandmother     Cancer Paternal Grandfather     Cancer Brother     Diabetes Maternal Grandmother     Stroke Maternal Grandfather     Cirrhosis Neg Hx        Allergies:  Review of patient's allergies indicates:  No Known Allergies    Medications:  Current Outpatient Medications   Medication Sig Dispense Refill    apixaban (ELIQUIS) 5 mg Tab Take 1 tablet (5 mg total) by mouth 2 (two) times daily. 90 tablet 3    atorvastatin (LIPITOR) 40 MG tablet Take 1 tablet (40 mg total) by mouth once daily. 90 tablet 3    clopidogrel (PLAVIX) 75 mg tablet Take 1 tablet (75 mg total) by mouth once daily. 30 tablet 11    cyanocobalamin, vitamin B-12, 500 mcg Subl Place 1 tablet under the tongue every Monday.       furosemide (LASIX) 80 MG tablet Take 1 tablet (80 mg total) by mouth 2 (two) times daily. 60 tablet 11    insulin aspart U-100 (NOVOLOG) 100 unit/mL injection Inject 4 Units into the skin 3 (three) times daily before meals.      insulin detemir (LEVEMIR FLEXPEN SUBQ) Inject 12 Units into the skin once daily.       lipase-protease-amylase (CREON) 36,000-114,000- 180,000 unit CpDR Take 1 capsule by mouth 3 (three) times daily. 270 capsule 3    metoprolol succinate  (TOPROL-XL) 50 MG 24 hr tablet Take 1 tablet (50 mg total) by mouth once daily. 90 tablet 3    omeprazole (PRILOSEC) 40 MG capsule Take 40 mg by mouth once daily.  8    ONETOUCH ULTRA TEST Strp 4 (four) times daily. Use to test blood sugar four times a day.  3    tamsulosin (FLOMAX) 0.4 mg Cap Take 1 capsule by mouth once daily. Pt has not started taking as of 2/27/19.  0     No current facility-administered medications for this visit.        Review of Systems   Constitutional: Positive for activity change, appetite change, fatigue and unexpected weight change.   HENT: Negative.    Eyes: Negative.    Respiratory: Positive for chest tightness and shortness of breath.    Cardiovascular: Positive for leg swelling. Negative for chest pain and palpitations.   Gastrointestinal: Positive for abdominal distention, abdominal pain and diarrhea.   Endocrine: Positive for polydipsia and polyphagia.   Genitourinary: Positive for difficulty urinating.   Musculoskeletal: Positive for arthralgias and gait problem.   Skin: Positive for wound (chronic venous ulcers BLE).   Allergic/Immunologic: Negative for environmental allergies, food allergies and immunocompromised state.   Neurological: Positive for dizziness and weakness.   Hematological: Does not bruise/bleed easily.   Psychiatric/Behavioral: Negative.       Objective:      Physical Exam   Constitutional: He is oriented to person, place, and time. He appears well-developed and well-nourished.   obese   HENT:   Head: Normocephalic.   Eyes: Pupils are equal, round, and reactive to light. No scleral icterus.   Neck: No JVD present. No tracheal deviation present.   Pulmonary/Chest: Effort normal and breath sounds normal.   Abdominal: Soft. He exhibits distension (ascites). He exhibits no mass. There is no tenderness. There is no guarding. No hernia.   Musculoskeletal: He exhibits edema.   Neurological: He is alert and oriented to person, place, and time.   Skin: Skin is warm and  dry.   Bruises     Psychiatric: He has a normal mood and affect. His behavior is normal. Judgment and thought content normal.   Nursing note and vitals reviewed.     Assessment:       1. Other ascites    2. Chronic pancreatitis, unspecified pancreatitis type    3. Lymphedema of both lower extremities    4. Morbid obesity with BMI of 50.0-59.9, adult    5. Venous stasis dermatitis of both lower extremities        Laboratory Data:  Neuroendocrine Labs Latest Ref Rng & Units 6/18/2019 6/14/2019 6/11/2019 6/11/2019 6/11/2019 6/5/2019 5/30/2019   FOLATE 4.0 - 24.0 ng/mL - - - - - - -   VITAMIN B12 210 - 950 pg/mL - - - - - - -   TSH 0.400 - 4.000 uIU/mL - - - - - - -   WBC 3.90 - 12.70 K/uL - - 7.53 - - - -   HGB 14.0 - 18.0 g/dL - - 9.0(L) - - - -   HCT 40.0 - 54.0 % - - 27.3(L) - - - -   PLATLETS 150 - 350 K/uL - - 336 - - - -   PT 9.0 - 12.5 sec - - - - - - -   PTT 21.0 - 32.0 sec - - - - - - -   INR 0.8 - 1.2 - - - - - - -   GLUCOSE 70 - 110 mg/dL - - 290(H) - - - -   BUN 8 - 23 mg/dL - - 51(H) - - - -   CREATININE 0.5 - 1.4 mg/dL - - 2.7(H) - - - -    - 145 mmol/L - - 130(L) - - - -   K 3.5 - 5.1 mmol/L - - 3.0(L) - - - -   CHLORIDE 95 - 110 mmol/L - - 102 - - - -   CO2 23 - 29 mmol/L - - 21(L) - - - -   CALCIUM 8.7 - 10.5 mg/dL - - 7.2(L) - - - -   PROTEIN, TOTAL 6.0 - 8.4 g/dL - - 4.5(L) - - - -   PHOSPHORUS 2.7 - 4.5 mg/dL - - - - - - -   ALBUMIN 3.5 - 5.2 g/dL - - 1.6(L) - - - -   TOTAL BILIRUBIN 0.1 - 1.0 mg/dL - - 0.3 - - - -   DIRECT BILIRUBIN 0.1 - 0.3 mg/dL - - - - - - -   ALK PHOSPHATASE 55 - 135 U/L - - 141(H) - - - -   SGOT (AST) 10 - 40 U/L - - 19 - - - -   SGPT (ALT) 10 - 44 U/L - - 25 - - - -    - 260 U/L - - - - - - -   TRIGLYCERIDES 30 - 150 mg/dL - - - - - - -   CHOLERSTEROL 120 - 199 mg/dL - - - - - - -   HDL 40 - 75 mg/dL - - - - - - -   LDL 63.0 - 159.0 mg/dL - - - - - - -   MG 1.6 - 2.6 mg/dL - - - - - - -   URINE AMYLASE U/L Not established U/L - - - - - - -   24 HR URINE PROTEIN  0 - 15 mg/dL - - - - - - -   24 HR CREATININE CLEARANCE 23.0 - 375.0 mg/dL - - - - - - -   HEMOGLOBIN A1C 4.0 - 5.6 % - - - - - - -   Weight - 280 lbs 280 lbs 14 oz - 278 lbs 278 lbs 268 lbs 268 lbs 5 oz   Sedimentation Rate, Manual 0 - 10 mm/Hr - - - - - - -         Impression: 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   Plan:     long discussion with patient regarding importance of compliance to fluid and sodium restriction management of diabetes to delay progression of renal disease etc  1 hour  Add aldactone 25 mg BId   1000 cc fluid restriction   2 g sodium restriction  Daily weights  Check blood sugars a.c. and you resume using short-acting insulin based on blood glucose  CMP in 1 week after adding Aldactone  Ultrasound elastography of liver  Case discussed with interventional radiology to evaluate possibility of portal vein SMV stent  RTC 3 weeks              SON oRwe MD, FACS  Professor of Surgery, [facility]  Neuroendocrine Surgery, Hepatic/Pancreatic & General Surgery  [address], [address]  [place]  [address]  ph. [phone]; [phone]  fax. [phone]

## 2019-06-18 NOTE — PATIENT INSTRUCTIONS
Return To Clinic: follow up visit in 3 weeks --- appointment made    Logs: make a log of daily weight checks to bring to your next appointment

## 2019-06-21 ENCOUNTER — TELEPHONE (OUTPATIENT)
Dept: NEUROLOGY | Facility: HOSPITAL | Age: 64
End: 2019-06-21

## 2019-06-21 ENCOUNTER — OFFICE VISIT (OUTPATIENT)
Dept: INTERNAL MEDICINE | Facility: CLINIC | Age: 64
End: 2019-06-21
Payer: COMMERCIAL

## 2019-06-21 ENCOUNTER — HOSPITAL ENCOUNTER (OUTPATIENT)
Dept: INTERVENTIONAL RADIOLOGY/VASCULAR | Facility: HOSPITAL | Age: 64
Discharge: HOME | End: 2019-06-21
Attending: INTERNAL MEDICINE
Payer: COMMERCIAL

## 2019-06-21 ENCOUNTER — NURSE TRIAGE (OUTPATIENT)
Dept: ADMINISTRATIVE | Facility: CLINIC | Age: 64
End: 2019-06-21

## 2019-06-21 ENCOUNTER — TELEPHONE (OUTPATIENT)
Dept: INTERNAL MEDICINE | Facility: CLINIC | Age: 64
End: 2019-06-21

## 2019-06-21 VITALS
OXYGEN SATURATION: 100 % | DIASTOLIC BLOOD PRESSURE: 70 MMHG | SYSTOLIC BLOOD PRESSURE: 115 MMHG | HEART RATE: 93 BPM | RESPIRATION RATE: 16 BRPM

## 2019-06-21 VITALS
HEART RATE: 77 BPM | TEMPERATURE: 99 F | HEIGHT: 67 IN | RESPIRATION RATE: 18 BRPM | BODY MASS INDEX: 43.85 KG/M2 | SYSTOLIC BLOOD PRESSURE: 82 MMHG | DIASTOLIC BLOOD PRESSURE: 57 MMHG

## 2019-06-21 DIAGNOSIS — R18.8 OTHER ASCITES: ICD-10-CM

## 2019-06-21 DIAGNOSIS — R53.1 GENERALIZED WEAKNESS: ICD-10-CM

## 2019-06-21 DIAGNOSIS — I15.2 HYPERTENSION ASSOCIATED WITH DIABETES: ICD-10-CM

## 2019-06-21 DIAGNOSIS — E11.59 HYPERTENSION ASSOCIATED WITH DIABETES: ICD-10-CM

## 2019-06-21 DIAGNOSIS — E66.01 MORBID OBESITY WITH BMI OF 50.0-59.9, ADULT: ICD-10-CM

## 2019-06-21 DIAGNOSIS — E11.69 HYPERLIPIDEMIA ASSOCIATED WITH TYPE 2 DIABETES MELLITUS: ICD-10-CM

## 2019-06-21 DIAGNOSIS — I48.0 PAROXYSMAL ATRIAL FIBRILLATION: ICD-10-CM

## 2019-06-21 DIAGNOSIS — E78.5 HYPERLIPIDEMIA ASSOCIATED WITH TYPE 2 DIABETES MELLITUS: ICD-10-CM

## 2019-06-21 DIAGNOSIS — R62.7 FTT (FAILURE TO THRIVE) IN ADULT: Primary | ICD-10-CM

## 2019-06-21 DIAGNOSIS — E66.2 OBESITY HYPOVENTILATION SYNDROME: ICD-10-CM

## 2019-06-21 DIAGNOSIS — E11.42 DM TYPE 2 WITH DIABETIC PERIPHERAL NEUROPATHY: ICD-10-CM

## 2019-06-21 DIAGNOSIS — I25.84 CORONARY ARTERY DISEASE DUE TO CALCIFIED CORONARY LESION: ICD-10-CM

## 2019-06-21 DIAGNOSIS — I70.90 ATHEROSCLEROSIS: ICD-10-CM

## 2019-06-21 DIAGNOSIS — K86.1 CHRONIC PANCREATITIS, UNSPECIFIED PANCREATITIS TYPE: ICD-10-CM

## 2019-06-21 DIAGNOSIS — I25.10 CORONARY ARTERY DISEASE DUE TO CALCIFIED CORONARY LESION: ICD-10-CM

## 2019-06-21 DIAGNOSIS — I89.0 LYMPHEDEMA OF BOTH LOWER EXTREMITIES: ICD-10-CM

## 2019-06-21 LAB
ALBUMIN FLD-MCNC: 0.5 G/DL
APPEARANCE FLD: NORMAL
BODY FLD TYPE: NORMAL
COLOR FLD: NORMAL
LYMPHOCYTES NFR FLD MANUAL: 46 %
MESOTHL CELL NFR FLD MANUAL: 7 %
MONOS+MACROS NFR FLD MANUAL: 36 %
NEUTROPHILS NFR FLD MANUAL: 11 %
PROT FLD-MCNC: 1.1 G/DL
SPECIMEN SOURCE: NORMAL
SPECIMEN SOURCE: NORMAL
WBC # FLD: 142 /CU MM

## 2019-06-21 PROCEDURE — 99214 PR OFFICE/OUTPT VISIT, EST, LEVL IV, 30-39 MIN: ICD-10-PCS | Mod: S$GLB,,, | Performed by: INTERNAL MEDICINE

## 2019-06-21 PROCEDURE — 3008F BODY MASS INDEX DOCD: CPT | Mod: CPTII,S$GLB,, | Performed by: INTERNAL MEDICINE

## 2019-06-21 PROCEDURE — 89051 BODY FLUID CELL COUNT: CPT

## 2019-06-21 PROCEDURE — 49083 ABD PARACENTESIS W/IMAGING: CPT

## 2019-06-21 PROCEDURE — 99999 PR PBB SHADOW E&M-EST. PATIENT-LVL III: CPT | Mod: PBBFAC,,, | Performed by: INTERNAL MEDICINE

## 2019-06-21 PROCEDURE — 49083 ABD PARACENTESIS W/IMAGING: CPT | Mod: ,,, | Performed by: RADIOLOGY

## 2019-06-21 PROCEDURE — 99214 OFFICE O/P EST MOD 30 MIN: CPT | Mod: S$GLB,,, | Performed by: INTERNAL MEDICINE

## 2019-06-21 PROCEDURE — 82042 OTHER SOURCE ALBUMIN QUAN EA: CPT

## 2019-06-21 PROCEDURE — 3045F PR MOST RECENT HEMOGLOBIN A1C LEVEL 7.0-9.0%: CPT | Mod: CPTII,S$GLB,, | Performed by: INTERNAL MEDICINE

## 2019-06-21 PROCEDURE — 3074F SYST BP LT 130 MM HG: CPT | Mod: CPTII,S$GLB,, | Performed by: INTERNAL MEDICINE

## 2019-06-21 PROCEDURE — 49083 IR PARACENTESIS WITH IMAGING: ICD-10-PCS | Mod: ,,, | Performed by: RADIOLOGY

## 2019-06-21 PROCEDURE — 99999 PR PBB SHADOW E&M-EST. PATIENT-LVL III: ICD-10-PCS | Mod: PBBFAC,,, | Performed by: INTERNAL MEDICINE

## 2019-06-21 PROCEDURE — 3078F DIAST BP <80 MM HG: CPT | Mod: CPTII,S$GLB,, | Performed by: INTERNAL MEDICINE

## 2019-06-21 PROCEDURE — 3008F PR BODY MASS INDEX (BMI) DOCUMENTED: ICD-10-PCS | Mod: CPTII,S$GLB,, | Performed by: INTERNAL MEDICINE

## 2019-06-21 PROCEDURE — 84157 ASSAY OF PROTEIN OTHER: CPT

## 2019-06-21 PROCEDURE — 3078F PR MOST RECENT DIASTOLIC BLOOD PRESSURE < 80 MM HG: ICD-10-PCS | Mod: CPTII,S$GLB,, | Performed by: INTERNAL MEDICINE

## 2019-06-21 PROCEDURE — 3045F PR MOST RECENT HEMOGLOBIN A1C LEVEL 7.0-9.0%: ICD-10-PCS | Mod: CPTII,S$GLB,, | Performed by: INTERNAL MEDICINE

## 2019-06-21 PROCEDURE — 3074F PR MOST RECENT SYSTOLIC BLOOD PRESSURE < 130 MM HG: ICD-10-PCS | Mod: CPTII,S$GLB,, | Performed by: INTERNAL MEDICINE

## 2019-06-21 NOTE — TELEPHONE ENCOUNTER
----- Message from Sisi Lim sent at 6/21/2019  9:57 AM CDT -----  Contact: self / [phone]  JPB: His blood sugar. Please advise

## 2019-06-21 NOTE — DISCHARGE INSTRUCTIONS
For scheduling: Call Isabelle at [phone]    For questions or concerns call: KESHAV MON-FRI 8 AM- 5PM [phone]. Radiology resident on call [phone].    For immediate concerns that are not emergent, you may call our radiology clinic at: [phone]...

## 2019-06-21 NOTE — PROCEDURES
Radiology Post-Procedure Note    Pre Op Diagnosis: Ascites  Post Op Diagnosis: Same    Procedure: Paracentesis    Procedure performed by: Martha Arias MD, MD, Nile     Written Informed Consent Obtained: Yes  Specimen Removed: YES   Estimated Blood Loss: Minimal    Findings:   Successful paracentesis.  Albumin administered PRN per protocol.    Patient tolerated procedure well.    Nile Esteves MD  Interventional Radiology PGY-II  [facility]  Pager: [phone]

## 2019-06-21 NOTE — H&P
Radiology History & Physical      SUBJECTIVE:     Chief Complaint: cirrhosis    History of Present Illness:  Jian Arrieta is a 63 y.o. male with cirrhosis who presents for paracentesis.    Past Medical History:   Diagnosis Date    Alcohol abuse     Anasarca 1/28/2019    Arthropathy associated with neurological disorder 9/2/2015    Atherosclerosis     Charcot foot due to diabetes mellitus     Chronic combined systolic and diastolic heart failure 01/29/2019 1-28-19 Left VentricleModerate decreased ejection fraction at 30%. Normal left ventricular cavity size. Normal wall thickness observed. Grade I (mild) left ventricular diastolic dysfunction consistent with impaired relaxation. Normal left atrial pressure. Moderate, global hypokinesis(see wall scoring diagram). Right VentricleNormal cavity size, wall thickness and ejection fraction. Wall motion n    Chronic pancreatitis 1/28/2019    Colon polyps     approx 5 yrs ago    Coronary artery disease due to calcified coronary lesion 05/08/2015    5 stents on ASA      Diabetic polyneuropathy associated with type 2 diabetes mellitus 9/2/2015    Diverticulosis 1/28/2019    DM type 2 with diabetic peripheral neuropathy 2/4/2019    Essential hypertension 1/28/2019    Former smoker 8/26/2015    Healed ulcer of left foot on examination 6/20/2017    Hydrocele     approx 1.5 yrs ago    Hypoalbuminemia 2/4/2019    Lymphedema of both lower extremities 1/29/2019    Mixed hyperlipidemia 5/8/2015    Morbid obesity with BMI of 50.0-59.9, adult 5/8/2015    Onychomycosis of multiple toenails with type 2 diabetes mellitus and peripheral neuropathy 6/20/2017    Perianal cyst     approx 2 yrs ago    Pseudocyst of pancreas 1/28/2019 1-28-19 Liver has a cirrhotic morphology with no focal lesions.  Significant interval increase in ascites when compared to prior exam which may account for patient's abdominal distension.  Hypodense air-fluid collection along the body  of the pancreas which is slightly smaller when compared to prior CT.  Findings may relate to pancreatic necrosis with pancreatic pseudocysts with infected pseudocyst    Skin cancer     skin cancer    Sleep apnea 8/26/2015    Status post bariatric surgery 1/11/2016    Type 2 diabetes mellitus, with long-term current use of insulin 5/8/2015     Past Surgical History:   Procedure Laterality Date    ANGIOGRAM, CORONARY ARTERY Right 3/20/2019    Performed by [facility]que [facility] at [facility] CATH LAB    ANGIOPLASTY      total x5 stents    Bypass graft study  3/20/2019    Performed by [facility] at [facility] CATH LAB    COLONOSCOPY N/A 10/6/2015    Performed by Shekhar Richards MD at [facility] ENDO (2ND FLR)    CORONARY ARTERY BYPASS GRAFT  2017    x3    CYST REMOVAL      GASTRECTOMY      GASTRECTOMY-SLEEVE-LAPAROSCOPIC - 07151 N/A 12/22/2015    Performed by Micheal Villavicencio Jr., MD at [facility] OR 2ND FLR    KNEE ARTHROSCOPY      perianal surgery      perianal cyst removed    pleurx N/A 5/17/2019    Performed by [facility] Diagnostic Provider at [facility] OR 2ND FLR       Home Meds:   Prior to Admission medications    Medication Sig Start Date End Date Taking? Authorizing Provider   apixaban (ELIQUIS) 5 mg Tab Take 1 tablet (5 mg total) by mouth 2 (two) times daily. 3/20/19   Lorie Higuera MD   atorvastatin (LIPITOR) 40 MG tablet Take 1 tablet (40 mg total) by mouth once daily. 2/4/19 2/4/20  Alfonso Mahmood MD   clopidogrel (PLAVIX) 75 mg tablet Take 1 tablet (75 mg total) by mouth once daily. 3/21/19 3/20/20  Lorie Higuera MD   cyanocobalamin, vitamin B-12, 500 mcg Subl Place 1 tablet under the tongue every Monday.     Historical Provider, MD   furosemide (LASIX) 80 MG tablet Take 1 tablet (80 mg total) by mouth 2 (two) times daily. 5/29/19 5/28/20  Roly Caba MD   insulin aspart U-100 (NOVOLOG) 100 unit/mL injection Inject 4 Units into the skin 3 (three) times daily before meals.    Historical Provider, MD    insulin detemir (LEVEMIR FLEXPEN SUBQ) Inject 12 Units into the skin once daily.     Historical Provider, MD   lipase-protease-amylase (CREON) 36,000-114,000- 180,000 unit CpDR Take 1 capsule by mouth 3 (three) times daily. 2/4/19   Alfonso Mahmood MD   metoprolol succinate (TOPROL-XL) 50 MG 24 hr tablet Take 1 tablet (50 mg total) by mouth once daily. 5/29/19 5/28/20  Roly Caba MD   omeprazole (PRILOSEC) 40 MG capsule Take 40 mg by mouth once daily. 2/18/19   Historical Provider, MD   ONETOUCH ULTRA TEST Strp 4 (four) times daily. Use to test blood sugar four times a day. 10/15/15   Historical Provider, MD   spironolactone (ALDACTONE) 25 MG tablet Take 25 mg by mouth 2 (two) times daily.    Historical Provider, MD   tamsulosin (FLOMAX) 0.4 mg Cap Take 1 capsule by mouth once daily. Pt has not started taking as of 2/27/19. 2/5/19   Historical Provider, MD     Anticoagulants/Antiplatelets: no anticoagulation    Allergies: Review of patient's allergies indicates:  No Known Allergies  Sedation History:  no adverse reactions    Review of Systems:   Hematological: no known coagulopathies  Respiratory: no shortness of breath  Cardiovascular: no chest pain  Gastrointestinal: no abdominal pain  Genito-Urinary: no dysuria  Musculoskeletal: negative  Neurological: no TIA or stroke symptoms         OBJECTIVE:     Vital Signs (Most Recent)  Pulse: 82 (06/21/19 1101)  Resp: 16 (06/21/19 1101)  SpO2: 100 % (06/21/19 1101)    Physical Exam:  ASA: 3  Mallampati: 3  General: no acute distress  Mental Status: alert and oriented to person, place and time  HEENT: normocephalic, atraumatic  Chest: unlabored breathing  Heart: regular heart rate  Abdomen: nondistended  Extremity: moves all extremities    Laboratory  Lab Results   Component Value Date    INR 1.2 05/24/2019       Lab Results   Component Value Date    WBC 7.53 06/11/2019    HGB 9.0 (L) 06/11/2019    HCT 27.3 (L) 06/11/2019    MCV 88 06/11/2019     06/11/2019       Lab Results   Component Value Date     (H) 06/11/2019     (L) 06/11/2019    K 3.0 (L) 06/11/2019     06/11/2019    CO2 21 (L) 06/11/2019    BUN 51 (H) 06/11/2019    CREATININE 2.7 (H) 06/11/2019    CALCIUM 7.2 (L) 06/11/2019    MG 1.6 05/29/2019    ALT 25 06/11/2019    AST 19 06/11/2019    ALBUMIN 1.6 (L) 06/11/2019    BILITOT 0.3 06/11/2019    BILIDIR 0.2 07/10/2015       ASSESSMENT/PLAN:     Sedation Plan: local  Patient will undergo paracentesis.    Nile Esteves MD  Interventional Radiology PGY-II  [facility]  Pager: [phone]

## 2019-06-21 NOTE — TELEPHONE ENCOUNTER
Returned pts sister call. Pt had CBG of 500 yesterday. Sister would like pt to go to SNF. Will discuss with Dr. Connors at appointment this afternoon. Advised that MD can call Dr. Rowe if any recommendations are needed.

## 2019-06-21 NOTE — TELEPHONE ENCOUNTER
Reason for Disposition   Requesting regular office appointment    Protocols used: INFORMATION ONLY CALL - NO TRIAGE-P-AH    Patient's sister Ms. Jackman called to make an appt with pcp to complete SNF enrollment. She has been her brother's care giver and has all the paperwork from his insurance company and case management, etc. appt scheduled with Dr. Barajas today.

## 2019-06-21 NOTE — PROGRESS NOTES
Pt arrived MPU bed 2 for paracentesis. No acute distress noted. Labs and orders reviewed . MD notified.

## 2019-06-21 NOTE — PROGRESS NOTES
Subjective:       Patient ID: Jian Arrieta is a 63 y.o. male.    Chief Complaint: Hyperglycemia (yesterday 500) and Advice Only (requesting SNF )    HPI   Pt with T2DM with neuropathy, PAF, HTN, HLD, Atherosclerosis, Morbid Obesity, Chronic LE lymphedema, BERHANE, Ascites/portal HTN, Chronic pancreatitis is here for f/u. Pt's is requesting admission to a SNF b/c of his chronic medical issues. Pt is progressively getting weaker, he is unable to manage his medications, admits to poor endurance, difficulty with ADLs, etc. Per records, pt did not qualify for SNF placement.   Review of Systems   Constitutional: Positive for fatigue. Negative for activity change, appetite change, chills, diaphoresis, fever and unexpected weight change.   HENT: Negative for postnasal drip, rhinorrhea, sinus pressure, sneezing, sore throat, trouble swallowing and voice change.    Respiratory: Negative for cough, shortness of breath and wheezing.    Cardiovascular: Positive for leg swelling. Negative for chest pain and palpitations.   Gastrointestinal: Negative for abdominal pain, blood in stool, constipation, diarrhea, nausea and vomiting.   Genitourinary: Negative for dysuria.   Musculoskeletal: Negative for arthralgias and myalgias.   Skin: Negative for rash and wound.   Allergic/Immunologic: Negative for environmental allergies and food allergies.   Neurological: Positive for weakness.   Hematological: Negative for adenopathy. Does not bruise/bleed easily.       Objective:      Physical Exam   Constitutional: He is oriented to person, place, and time. He appears well-developed and well-nourished. No distress.   HENT:   Head: Normocephalic and atraumatic.   Eyes: Pupils are equal, round, and reactive to light. Conjunctivae and EOM are normal. Right eye exhibits no discharge. Left eye exhibits no discharge. No scleral icterus.   Neck: Normal range of motion. Neck supple. No JVD present.   Cardiovascular: Normal rate, regular rhythm and  normal heart sounds.   No murmur heard.  Pulmonary/Chest: Effort normal and breath sounds normal. No respiratory distress. He has no wheezes. He has no rales.   Abdominal: Soft. Bowel sounds are normal. He exhibits ascites. There is no tenderness.   Musculoskeletal: He exhibits edema (3+ pitting in B/L LE's).   Lymphadenopathy:     He has no cervical adenopathy.   Neurological: He is alert and oriented to person, place, and time.   Skin: Skin is warm and dry. No rash noted. He is not diaphoretic. No pallor.       Assessment:       1. FTT (failure to thrive) in adult    2. Generalized weakness    3. DM type 2 with diabetic peripheral neuropathy    4. Paroxysmal atrial fibrillation    5. Other ascites    6. Coronary artery disease due to calcified coronary lesion    7. Hypertension associated with diabetes    8. Hyperlipidemia associated with type 2 diabetes mellitus    9. Morbid obesity with BMI of 50.0-59.9, adult    10. Lymphedema of both lower extremities    11. Obesity hypoventilation syndrome    12. Atherosclerosis    13. Chronic pancreatitis, unspecified pancreatitis type        Plan:    1-2. I feel pt would benefit from continued HHC/PT/OT vs NH placement vs SNF(pt did not meed criteria currently)   3. T2DM with neuropathy/CKD- last A1C of 7.4(3/19)<--6.9(1/19)   4. PAF- stable, CPT   5. Ascites/portal HTN- followed by Hepatology    6. CAD- stable, CPT   7. HTN- stable, CPT   8. HLD- stable   9. Morbid Obesity- pt advised on proper diet/exercise for weight loss  10. Chronic LE lymphedema with stasis dermatitis- fair control on current meds  11. BERHANE- stable  12. Aortic atherosclerosis- stable  13. Chronic pancreatitis- stable

## 2019-06-21 NOTE — PROGRESS NOTES
Paracentesis complete. 3400 mLs peritoneal fluid drained. Pt tolerated well. Dressing to abdomen. clean, dry, and intact.  Specimens sent per lab order. Pt acknowledged understanding of discharge instructions.Pt transport requested.

## 2019-06-24 ENCOUNTER — TELEPHONE (OUTPATIENT)
Dept: INTERNAL MEDICINE | Facility: CLINIC | Age: 64
End: 2019-06-24

## 2019-06-24 ENCOUNTER — PATIENT OUTREACH (OUTPATIENT)
Dept: ADMINISTRATIVE | Facility: HOSPITAL | Age: 64
End: 2019-06-24

## 2019-06-24 NOTE — TELEPHONE ENCOUNTER
Instructed she needed to contact the / home health person for assistance and they will contact our office.

## 2019-06-24 NOTE — TELEPHONE ENCOUNTER
----- Message from Cassie Pandya sent at 6/24/2019 11:49 AM CDT -----  Contact: pt sister Nena [phone]  Pt sister called in requesting to get orders for nursing home for patient.

## 2019-06-25 ENCOUNTER — HOSPITAL ENCOUNTER (INPATIENT)
Facility: HOSPITAL | Age: 64
LOS: 1 days | Discharge: ANOTHER HOSPITAL | DRG: 603 | End: 2019-06-27
Attending: EMERGENCY MEDICINE | Admitting: INTERNAL MEDICINE
Payer: COMMERCIAL

## 2019-06-25 ENCOUNTER — HOSPITAL ENCOUNTER (OUTPATIENT)
Dept: INTERVENTIONAL RADIOLOGY/VASCULAR | Facility: HOSPITAL | Age: 64
Discharge: HOME | DRG: 603 | End: 2019-06-25
Attending: INTERNAL MEDICINE
Payer: COMMERCIAL

## 2019-06-25 VITALS
OXYGEN SATURATION: 100 % | RESPIRATION RATE: 18 BRPM | DIASTOLIC BLOOD PRESSURE: 58 MMHG | HEART RATE: 83 BPM | SYSTOLIC BLOOD PRESSURE: 115 MMHG

## 2019-06-25 DIAGNOSIS — I89.0 LYMPHEDEMA OF BOTH LOWER EXTREMITIES: ICD-10-CM

## 2019-06-25 DIAGNOSIS — R20.9 COLD EXTREMITIES: ICD-10-CM

## 2019-06-25 DIAGNOSIS — E11.42 DM TYPE 2 WITH DIABETIC PERIPHERAL NEUROPATHY: ICD-10-CM

## 2019-06-25 DIAGNOSIS — R18.8 OTHER ASCITES: ICD-10-CM

## 2019-06-25 DIAGNOSIS — K86.1 CHRONIC PANCREATITIS, UNSPECIFIED PANCREATITIS TYPE: ICD-10-CM

## 2019-06-25 DIAGNOSIS — I50.32 CHRONIC DIASTOLIC HEART FAILURE: ICD-10-CM

## 2019-06-25 DIAGNOSIS — G47.33 OBSTRUCTIVE SLEEP APNEA SYNDROME: ICD-10-CM

## 2019-06-25 DIAGNOSIS — Z75.8 DISCHARGE PLANNING ISSUES: ICD-10-CM

## 2019-06-25 DIAGNOSIS — I25.84 CORONARY ARTERY DISEASE DUE TO CALCIFIED CORONARY LESION: ICD-10-CM

## 2019-06-25 DIAGNOSIS — L03.115 CELLULITIS OF RIGHT LOWER EXTREMITY WITHOUT FOOT: ICD-10-CM

## 2019-06-25 DIAGNOSIS — E44.0 MALNUTRITION OF MODERATE DEGREE: ICD-10-CM

## 2019-06-25 DIAGNOSIS — I25.10 CORONARY ARTERY DISEASE DUE TO CALCIFIED CORONARY LESION: ICD-10-CM

## 2019-06-25 DIAGNOSIS — K74.00 HEPATIC FIBROSIS: ICD-10-CM

## 2019-06-25 DIAGNOSIS — R26.89 DECREASED MOBILITY: ICD-10-CM

## 2019-06-25 DIAGNOSIS — I50.9 CHF (CONGESTIVE HEART FAILURE): ICD-10-CM

## 2019-06-25 DIAGNOSIS — I48.0 PAROXYSMAL ATRIAL FIBRILLATION: ICD-10-CM

## 2019-06-25 DIAGNOSIS — E66.01 MORBID OBESITY WITH BMI OF 50.0-59.9, ADULT: ICD-10-CM

## 2019-06-25 DIAGNOSIS — R60.0 LEG EDEMA, RIGHT: ICD-10-CM

## 2019-06-25 DIAGNOSIS — E88.09 HYPOALBUMINEMIA: ICD-10-CM

## 2019-06-25 DIAGNOSIS — N18.30 STAGE 3 CHRONIC KIDNEY DISEASE: ICD-10-CM

## 2019-06-25 PROBLEM — L53.9 ERYTHEMA OF LOWER EXTREMITY: Status: ACTIVE | Noted: 2019-06-25

## 2019-06-25 PROBLEM — Z02.9 DISCHARGE PLANNING ISSUES: Status: ACTIVE | Noted: 2019-06-25

## 2019-06-25 LAB
ALBUMIN SERPL BCP-MCNC: 1.7 G/DL (ref 3.5–5.2)
ALP SERPL-CCNC: 125 U/L (ref 55–135)
ALT SERPL W/O P-5'-P-CCNC: 30 U/L (ref 10–44)
ANION GAP SERPL CALC-SCNC: 8 MMOL/L (ref 8–16)
AST SERPL-CCNC: 31 U/L (ref 10–40)
BASOPHILS # BLD AUTO: 0.01 K/UL (ref 0–0.2)
BASOPHILS NFR BLD: 0.1 % (ref 0–1.9)
BILIRUB SERPL-MCNC: 0.3 MG/DL (ref 0.1–1)
BILIRUB UR QL STRIP: NEGATIVE
BNP SERPL-MCNC: 119 PG/ML (ref 0–99)
BUN SERPL-MCNC: 58 MG/DL (ref 8–23)
CALCIUM SERPL-MCNC: 7.3 MG/DL (ref 8.7–10.5)
CHLORIDE SERPL-SCNC: 103 MMOL/L (ref 95–110)
CLARITY UR REFRACT.AUTO: CLEAR
CO2 SERPL-SCNC: 24 MMOL/L (ref 23–29)
COLOR UR AUTO: YELLOW
CREAT SERPL-MCNC: 2.3 MG/DL (ref 0.5–1.4)
DIFFERENTIAL METHOD: ABNORMAL
EOSINOPHIL # BLD AUTO: 0 K/UL (ref 0–0.5)
EOSINOPHIL NFR BLD: 0.1 % (ref 0–8)
ERYTHROCYTE [DISTWIDTH] IN BLOOD BY AUTOMATED COUNT: 14.6 % (ref 11.5–14.5)
EST. GFR  (AFRICAN AMERICAN): 33.4 ML/MIN/1.73 M^2
EST. GFR  (NON AFRICAN AMERICAN): 28.9 ML/MIN/1.73 M^2
GLUCOSE SERPL-MCNC: 62 MG/DL (ref 70–110)
GLUCOSE UR QL STRIP: NEGATIVE
HCT VFR BLD AUTO: 27.7 % (ref 40–54)
HGB BLD-MCNC: 9 G/DL (ref 14–18)
HGB UR QL STRIP: NEGATIVE
IMM GRANULOCYTES # BLD AUTO: 0.04 K/UL (ref 0–0.04)
IMM GRANULOCYTES NFR BLD AUTO: 0.4 % (ref 0–0.5)
INR PPP: 1.1 (ref 0.8–1.2)
KETONES UR QL STRIP: NEGATIVE
LEUKOCYTE ESTERASE UR QL STRIP: NEGATIVE
LYMPHOCYTES # BLD AUTO: 1.4 K/UL (ref 1–4.8)
LYMPHOCYTES NFR BLD: 15.2 % (ref 18–48)
MAGNESIUM SERPL-MCNC: 2.2 MG/DL (ref 1.6–2.6)
MCH RBC QN AUTO: 29.4 PG (ref 27–31)
MCHC RBC AUTO-ENTMCNC: 32.5 G/DL (ref 32–36)
MCV RBC AUTO: 91 FL (ref 82–98)
MONOCYTES # BLD AUTO: 0.5 K/UL (ref 0.3–1)
MONOCYTES NFR BLD: 5.1 % (ref 4–15)
NEUTROPHILS # BLD AUTO: 7.3 K/UL (ref 1.8–7.7)
NEUTROPHILS NFR BLD: 79.1 % (ref 38–73)
NITRITE UR QL STRIP: NEGATIVE
NRBC BLD-RTO: 0 /100 WBC
PH UR STRIP: 5 [PH] (ref 5–8)
PLATELET # BLD AUTO: 255 K/UL (ref 150–350)
PMV BLD AUTO: 9.9 FL (ref 9.2–12.9)
POCT GLUCOSE: 123 MG/DL (ref 70–110)
POCT GLUCOSE: 160 MG/DL (ref 70–110)
POCT GLUCOSE: 43 MG/DL (ref 70–110)
POCT GLUCOSE: 63 MG/DL (ref 70–110)
POTASSIUM SERPL-SCNC: 3.3 MMOL/L (ref 3.5–5.1)
PROT SERPL-MCNC: 4.9 G/DL (ref 6–8.4)
PROT UR QL STRIP: NEGATIVE
PROTHROMBIN TIME: 10.9 SEC (ref 9–12.5)
RBC # BLD AUTO: 3.06 M/UL (ref 4.6–6.2)
SODIUM SERPL-SCNC: 135 MMOL/L (ref 136–145)
SP GR UR STRIP: 1.01 (ref 1–1.03)
TROPONIN I SERPL DL<=0.01 NG/ML-MCNC: <0.006 NG/ML (ref 0–0.03)
URN SPEC COLLECT METH UR: NORMAL
WBC # BLD AUTO: 9.25 K/UL (ref 3.9–12.7)

## 2019-06-25 PROCEDURE — 63600175 PHARM REV CODE 636 W HCPCS: Performed by: EMERGENCY MEDICINE

## 2019-06-25 PROCEDURE — 87040 BLOOD CULTURE FOR BACTERIA: CPT

## 2019-06-25 PROCEDURE — 99285 PR EMERGENCY DEPT VISIT,LEVEL V: ICD-10-PCS | Mod: ,,, | Performed by: EMERGENCY MEDICINE

## 2019-06-25 PROCEDURE — G0378 HOSPITAL OBSERVATION PER HR: HCPCS

## 2019-06-25 PROCEDURE — 49083 ABD PARACENTESIS W/IMAGING: CPT

## 2019-06-25 PROCEDURE — 85025 COMPLETE CBC W/AUTO DIFF WBC: CPT

## 2019-06-25 PROCEDURE — 25000003 PHARM REV CODE 250: Performed by: EMERGENCY MEDICINE

## 2019-06-25 PROCEDURE — 84484 ASSAY OF TROPONIN QUANT: CPT

## 2019-06-25 PROCEDURE — 80053 COMPREHEN METABOLIC PANEL: CPT

## 2019-06-25 PROCEDURE — 94660 CPAP INITIATION&MGMT: CPT

## 2019-06-25 PROCEDURE — 83735 ASSAY OF MAGNESIUM: CPT

## 2019-06-25 PROCEDURE — 99285 EMERGENCY DEPT VISIT HI MDM: CPT | Mod: 25

## 2019-06-25 PROCEDURE — 82962 GLUCOSE BLOOD TEST: CPT | Mod: 59

## 2019-06-25 PROCEDURE — 99220 PR INITIAL OBSERVATION CARE,LEVL III: ICD-10-PCS | Mod: ,,, | Performed by: PHYSICIAN ASSISTANT

## 2019-06-25 PROCEDURE — 99285 EMERGENCY DEPT VISIT HI MDM: CPT | Mod: ,,, | Performed by: EMERGENCY MEDICINE

## 2019-06-25 PROCEDURE — 83880 ASSAY OF NATRIURETIC PEPTIDE: CPT

## 2019-06-25 PROCEDURE — 49083 ABD PARACENTESIS W/IMAGING: CPT | Mod: ,,, | Performed by: FAMILY MEDICINE

## 2019-06-25 PROCEDURE — 81003 URINALYSIS AUTO W/O SCOPE: CPT

## 2019-06-25 PROCEDURE — 96365 THER/PROPH/DIAG IV INF INIT: CPT

## 2019-06-25 PROCEDURE — 49083 IR PARACENTESIS WITH IMAGING: ICD-10-PCS | Mod: ,,, | Performed by: FAMILY MEDICINE

## 2019-06-25 PROCEDURE — 99220 PR INITIAL OBSERVATION CARE,LEVL III: CPT | Mod: ,,, | Performed by: PHYSICIAN ASSISTANT

## 2019-06-25 PROCEDURE — 85610 PROTHROMBIN TIME: CPT

## 2019-06-25 PROCEDURE — 96366 THER/PROPH/DIAG IV INF ADDON: CPT

## 2019-06-25 RX ORDER — FUROSEMIDE 40 MG/1
80 TABLET ORAL 2 TIMES DAILY
Status: DISCONTINUED | OUTPATIENT
Start: 2019-06-26 | End: 2019-06-25

## 2019-06-25 RX ORDER — POTASSIUM CHLORIDE 20 MEQ/15ML
40 SOLUTION ORAL ONCE
Status: COMPLETED | OUTPATIENT
Start: 2019-06-25 | End: 2019-06-26

## 2019-06-25 RX ORDER — RAMELTEON 8 MG/1
8 TABLET ORAL NIGHTLY PRN
Status: DISCONTINUED | OUTPATIENT
Start: 2019-06-25 | End: 2019-06-27 | Stop reason: HOSPADM

## 2019-06-25 RX ORDER — AMOXICILLIN 250 MG
1 CAPSULE ORAL 2 TIMES DAILY PRN
Status: DISCONTINUED | OUTPATIENT
Start: 2019-06-25 | End: 2019-06-27 | Stop reason: HOSPADM

## 2019-06-25 RX ORDER — CLOPIDOGREL BISULFATE 75 MG/1
75 TABLET ORAL DAILY
Status: DISCONTINUED | OUTPATIENT
Start: 2019-06-26 | End: 2019-06-27 | Stop reason: HOSPADM

## 2019-06-25 RX ORDER — TAMSULOSIN HYDROCHLORIDE 0.4 MG/1
1 CAPSULE ORAL DAILY
Status: DISCONTINUED | OUTPATIENT
Start: 2019-06-26 | End: 2019-06-27 | Stop reason: HOSPADM

## 2019-06-25 RX ORDER — GLUCAGON 1 MG
1 KIT INJECTION
Status: DISCONTINUED | OUTPATIENT
Start: 2019-06-25 | End: 2019-06-27 | Stop reason: HOSPADM

## 2019-06-25 RX ORDER — ONDANSETRON 4 MG/1
4 TABLET, ORALLY DISINTEGRATING ORAL EVERY 8 HOURS PRN
Status: DISCONTINUED | OUTPATIENT
Start: 2019-06-25 | End: 2019-06-27 | Stop reason: HOSPADM

## 2019-06-25 RX ORDER — INSULIN ASPART 100 [IU]/ML
0-5 INJECTION, SOLUTION INTRAVENOUS; SUBCUTANEOUS
Status: DISCONTINUED | OUTPATIENT
Start: 2019-06-25 | End: 2019-06-27 | Stop reason: HOSPADM

## 2019-06-25 RX ORDER — SODIUM CHLORIDE 0.9 % (FLUSH) 0.9 %
5 SYRINGE (ML) INJECTION
Status: DISCONTINUED | OUTPATIENT
Start: 2019-06-25 | End: 2019-06-27 | Stop reason: HOSPADM

## 2019-06-25 RX ORDER — PANTOPRAZOLE SODIUM 40 MG/1
40 TABLET, DELAYED RELEASE ORAL DAILY
Status: DISCONTINUED | OUTPATIENT
Start: 2019-06-26 | End: 2019-06-27 | Stop reason: HOSPADM

## 2019-06-25 RX ORDER — METOPROLOL SUCCINATE 50 MG/1
50 TABLET, EXTENDED RELEASE ORAL DAILY
Status: DISCONTINUED | OUTPATIENT
Start: 2019-06-26 | End: 2019-06-27 | Stop reason: HOSPADM

## 2019-06-25 RX ORDER — SPIRONOLACTONE 25 MG/1
25 TABLET ORAL 2 TIMES DAILY
Status: DISCONTINUED | OUTPATIENT
Start: 2019-06-25 | End: 2019-06-27 | Stop reason: HOSPADM

## 2019-06-25 RX ORDER — IBUPROFEN 200 MG
24 TABLET ORAL
Status: DISCONTINUED | OUTPATIENT
Start: 2019-06-25 | End: 2019-06-27 | Stop reason: HOSPADM

## 2019-06-25 RX ORDER — ACETAMINOPHEN 325 MG/1
650 TABLET ORAL EVERY 4 HOURS PRN
Status: DISCONTINUED | OUTPATIENT
Start: 2019-06-25 | End: 2019-06-27 | Stop reason: HOSPADM

## 2019-06-25 RX ORDER — IBUPROFEN 200 MG
16 TABLET ORAL
Status: DISCONTINUED | OUTPATIENT
Start: 2019-06-25 | End: 2019-06-27 | Stop reason: HOSPADM

## 2019-06-25 RX ORDER — IPRATROPIUM BROMIDE AND ALBUTEROL SULFATE 2.5; .5 MG/3ML; MG/3ML
3 SOLUTION RESPIRATORY (INHALATION) EVERY 4 HOURS PRN
Status: DISCONTINUED | OUTPATIENT
Start: 2019-06-25 | End: 2019-06-27 | Stop reason: HOSPADM

## 2019-06-25 RX ORDER — SODIUM CHLORIDE 0.9 % (FLUSH) 0.9 %
10 SYRINGE (ML) INJECTION
Status: CANCELLED | OUTPATIENT
Start: 2019-06-25

## 2019-06-25 RX ORDER — ATORVASTATIN CALCIUM 20 MG/1
40 TABLET, FILM COATED ORAL DAILY
Status: DISCONTINUED | OUTPATIENT
Start: 2019-06-26 | End: 2019-06-27 | Stop reason: HOSPADM

## 2019-06-25 RX ORDER — FUROSEMIDE 40 MG/1
80 TABLET ORAL 2 TIMES DAILY
Status: DISCONTINUED | OUTPATIENT
Start: 2019-06-25 | End: 2019-06-27 | Stop reason: HOSPADM

## 2019-06-25 RX ADMIN — SODIUM CHLORIDE 500 ML: 0.9 INJECTION, SOLUTION INTRAVENOUS at 05:06

## 2019-06-25 RX ADMIN — VANCOMYCIN HYDROCHLORIDE 2500 MG: 1 INJECTION, POWDER, LYOPHILIZED, FOR SOLUTION INTRAVENOUS at 05:06

## 2019-06-25 NOTE — H&P
Radiology History & Physical      SUBJECTIVE:     Chief Complaint: abdominal distention    History of Present Illness:  Jian Arrieta is a 63 y.o. male who presents for ultrasound guided paracentesis  Past Medical History:   Diagnosis Date    Alcohol abuse     Anasarca 1/28/2019    Arthropathy associated with neurological disorder 9/2/2015    Atherosclerosis     Charcot foot due to diabetes mellitus     Chronic combined systolic and diastolic heart failure 01/29/2019 1-28-19 Left VentricleModerate decreased ejection fraction at 30%. Normal left ventricular cavity size. Normal wall thickness observed. Grade I (mild) left ventricular diastolic dysfunction consistent with impaired relaxation. Normal left atrial pressure. Moderate, global hypokinesis(see wall scoring diagram). Right VentricleNormal cavity size, wall thickness and ejection fraction. Wall motion n    Chronic pancreatitis 1/28/2019    Colon polyps     approx 5 yrs ago    Coronary artery disease due to calcified coronary lesion 05/08/2015    5 stents on ASA      Diabetic polyneuropathy associated with type 2 diabetes mellitus 9/2/2015    Diverticulosis 1/28/2019    DM type 2 with diabetic peripheral neuropathy 2/4/2019    Essential hypertension 1/28/2019    Former smoker 8/26/2015    Healed ulcer of left foot on examination 6/20/2017    Hydrocele     approx 1.5 yrs ago    Hypoalbuminemia 2/4/2019    Lymphedema of both lower extremities 1/29/2019    Mixed hyperlipidemia 5/8/2015    Morbid obesity with BMI of 50.0-59.9, adult 5/8/2015    Onychomycosis of multiple toenails with type 2 diabetes mellitus and peripheral neuropathy 6/20/2017    Perianal cyst     approx 2 yrs ago    Pseudocyst of pancreas 1/28/2019 1-28-19 Liver has a cirrhotic morphology with no focal lesions.  Significant interval increase in ascites when compared to prior exam which may account for patient's abdominal distension.  Hypodense air-fluid collection  along the body of the pancreas which is slightly smaller when compared to prior CT.  Findings may relate to pancreatic necrosis with pancreatic pseudocysts with infected pseudocyst    Skin cancer     skin cancer    Sleep apnea 8/26/2015    Status post bariatric surgery 1/11/2016    Type 2 diabetes mellitus, with long-term current use of insulin 5/8/2015     Past Surgical History:   Procedure Laterality Date    ANGIOGRAM, CORONARY ARTERY Right 3/20/2019    Performed by [facility] at [facility] CATH LAB    ANGIOPLASTY      total x5 stents    Bypass graft study  3/20/2019    Performed by [facility] at [facility] CATH LAB    COLONOSCOPY N/A 10/6/2015    Performed by Shekhar Richards MD at [facility] ENDO (2ND FLR)    CORONARY ARTERY BYPASS GRAFT  2017    x3    CYST REMOVAL      GASTRECTOMY      GASTRECTOMY-SLEEVE-LAPAROSCOPIC - 30952 N/A 12/22/2015    Performed by Micheal Villavicencio Jr., MD at [facility] OR 2ND FLR    KNEE ARTHROSCOPY      perianal surgery      perianal cyst removed    pleurx N/A 5/17/2019    Performed by [facility] Diagnostic Provider at [facility] OR 2ND FLR       Home Meds:   Prior to Admission medications    Medication Sig Start Date End Date Taking? Authorizing Provider   apixaban (ELIQUIS) 5 mg Tab Take 1 tablet (5 mg total) by mouth 2 (two) times daily. 3/20/19   Lorie Higuera MD   atorvastatin (LIPITOR) 40 MG tablet Take 1 tablet (40 mg total) by mouth once daily. 2/4/19 2/4/20  Alfonso Mahmood MD   clopidogrel (PLAVIX) 75 mg tablet Take 1 tablet (75 mg total) by mouth once daily. 3/21/19 3/20/20  Lorie Higuera MD   cyanocobalamin, vitamin B-12, 500 mcg Subl Place 1 tablet under the tongue every Monday.     Historical Provider, MD   furosemide (LASIX) 80 MG tablet Take 1 tablet (80 mg total) by mouth 2 (two) times daily. 5/29/19 5/28/20  Roly Caba MD   insulin aspart U-100 (NOVOLOG) 100 unit/mL injection Inject 4 Units into the skin 3 (three) times daily before meals.    Historical  Provider, MD   insulin detemir (LEVEMIR FLEXPEN SUBQ) Inject 12 Units into the skin once daily.     Historical Provider, MD   lipase-protease-amylase (CREON) 36,000-114,000- 180,000 unit CpDR Take 1 capsule by mouth 3 (three) times daily. 2/4/19   Alfonso Mahmood MD   metoprolol succinate (TOPROL-XL) 50 MG 24 hr tablet Take 1 tablet (50 mg total) by mouth once daily. 5/29/19 5/28/20  Roly Caba MD   omeprazole (PRILOSEC) 40 MG capsule Take 40 mg by mouth once daily. 2/18/19   Historical Provider, MD   ONETOUCH ULTRA TEST Strp 4 (four) times daily. Use to test blood sugar four times a day. 10/15/15   Historical Provider, MD   spironolactone (ALDACTONE) 25 MG tablet Take 25 mg by mouth 2 (two) times daily.    Historical Provider, MD   tamsulosin (FLOMAX) 0.4 mg Cap Take 1 capsule by mouth once daily. Pt has not started taking as of 2/27/19. 2/5/19   Historical Provider, MD     Anticoagulants/Antiplatelets: Plavix and apixaban    Allergies: Review of patient's allergies indicates:  No Known Allergies  Sedation History:  no adverse reactions    Review of Systems:   Hematological: no known coagulopathies  Respiratory: no shortness of breath  Cardiovascular: no chest pain  Gastrointestinal: no abdominal pain  Genito-Urinary: no dysuria  Musculoskeletal: negative  Neurological: no TIA or stroke symptoms         OBJECTIVE:     Vital Signs (Most Recent)       Physical Exam:  ASA: 2  Mallampati: n/a    General: no acute distress  Mental Status: alert and oriented to person, place and time  HEENT: normocephalic, atraumatic  Chest: unlabored breathing  Heart: regular heart rate  Abdomen: distended  Extremity: moves all extremities    ASSESSMENT/PLAN:     Sedation Plan: local  Patient will undergo ultrasound guided paracentesis.    ANDREW Valerio, REINIERP  Interventional Radiology  [phone] [facility]

## 2019-06-25 NOTE — PROGRESS NOTES
Patient tolerated paracentesis well, 1200 ml fluid drained, dressing changed to right chest wall picc per patient request, both ports flush easily, patient taken to waiting area to await transport to [facility] to [facility]

## 2019-06-25 NOTE — ED TRIAGE NOTES
Patient reports to the ED today with reports of Bilaterally leg swelling and pain. Patient states that over the last few days his legs have gotten increasingly bigger and weeping. Patient reports inability to walk around home and gets SOB walking to the bathroom.

## 2019-06-25 NOTE — PROCEDURES
Radiology Post-Procedure Note    Pre Op Diagnosis: Ascites  Post Op Diagnosis: Same    Procedure: Ultrasound Guided Paracentesis    Procedure performed by: Cele GARCIA, Reyna     Written Informed Consent Obtained: Yes  Specimen Removed: YES linda fluid  Estimated Blood Loss: Minimal    Findings:   Successful paracentesis.  Albumin administered PRN per protocol.    Patient tolerated procedure well.    Reyna Oakley, APRN, FNP  Interventional Radiology  [phone] clinic

## 2019-06-25 NOTE — ED PROVIDER NOTES
"SCRIBE #1 NOTE: I, Binta Levine, am scribing for, and in the presence of,  Dr. Berman. I have scribed the following portions of the note - Other sections scribed: HPI, KEE.       CC: Leg Swelling (very very edematous legs , redness draining)      History provided by: Patient and sister     HPI: Jian Arrieta is a 63 y.o. year old male with history alcohol abuse, chronic combined systolic and diastolic heart failure, chronic pancreatitis, CAD (5 stents on ASA), type 2 diabetes on insulin, HTN, former smoker, hyperlipidemia, morbid obesity who presents to the ED complaining of leg swelling.    Sister reports patient with b/l leg swelling, erythema and drainage x 2 days. Symptoms worsened last night with severe pain and discoloration to his right leg and patient also had diaphoresis. Sister also reports patient lives at home with his wife who works and does not have anyone to care for him throughout the day, as he is unable to walk or take care of himself. She contacted his PCP for skilled nursing facility placement; however, she states, "they said the only way to get placed in SNF is through the ER." Last dose of eliquis this morning. Normal bowel movements. Blood pressure usually runs low.     Past Medical History:   Diagnosis Date    Alcohol abuse     Anasarca 1/28/2019    Arthropathy associated with neurological disorder 9/2/2015    Atherosclerosis     Charcot foot due to diabetes mellitus     Chronic combined systolic and diastolic heart failure 01/29/2019 1-28-19 Left VentricleModerate decreased ejection fraction at 30%. Normal left ventricular cavity size. Normal wall thickness observed. Grade I (mild) left ventricular diastolic dysfunction consistent with impaired relaxation. Normal left atrial pressure. Moderate, global hypokinesis(see wall scoring diagram). Right VentricleNormal cavity size, wall thickness and ejection fraction. Wall motion n    Chronic pancreatitis 1/28/2019    Colon polyps     " approx 5 yrs ago    Coronary artery disease due to calcified coronary lesion 05/08/2015    5 stents on ASA      Diabetic polyneuropathy associated with type 2 diabetes mellitus 9/2/2015    Diverticulosis 1/28/2019    DM type 2 with diabetic peripheral neuropathy 2/4/2019    Essential hypertension 1/28/2019    Former smoker 8/26/2015    Healed ulcer of left foot on examination 6/20/2017    Hydrocele     approx 1.5 yrs ago    Hypoalbuminemia 2/4/2019    Lymphedema of both lower extremities 1/29/2019    Mixed hyperlipidemia 5/8/2015    Morbid obesity with BMI of 50.0-59.9, adult 5/8/2015    Onychomycosis of multiple toenails with type 2 diabetes mellitus and peripheral neuropathy 6/20/2017    Perianal cyst     approx 2 yrs ago    Pseudocyst of pancreas 1/28/2019 1-28-19 Liver has a cirrhotic morphology with no focal lesions.  Significant interval increase in ascites when compared to prior exam which may account for patient's abdominal distension.  Hypodense air-fluid collection along the body of the pancreas which is slightly smaller when compared to prior CT.  Findings may relate to pancreatic necrosis with pancreatic pseudocysts with infected pseudocyst    Skin cancer     skin cancer    Sleep apnea 8/26/2015    Status post bariatric surgery 1/11/2016    Type 2 diabetes mellitus, with long-term current use of insulin 5/8/2015     Past Surgical History:   Procedure Laterality Date    ANGIOGRAM, CORONARY ARTERY Right 3/20/2019    Performed by [facility]que [facility] at [facility] CATH LAB    ANGIOPLASTY      total x5 stents    Bypass graft study  3/20/2019    Performed by [facility]que [facility] at [facility] CATH LAB    COLONOSCOPY N/A 10/6/2015    Performed by Shekhar Richards MD at [facility] ENDO (2ND FLR)    CORONARY ARTERY BYPASS GRAFT  2017    x3    CYST REMOVAL      GASTRECTOMY      GASTRECTOMY-SLEEVE-LAPAROSCOPIC - 73546 N/A 12/22/2015    Performed by Mihceal Villavicencio Jr., MD at [facility] OR 2ND FLR     KNEE ARTHROSCOPY      perianal surgery      perianal cyst removed    pleurx N/A 5/17/2019    [facility]     Family History   Problem Relation Age of Onset    Cancer Mother     Cancer Father     Heart disease Father     Obesity Sister     Parkinsonism Brother     No Known Problems Paternal Grandmother     Cancer Paternal Grandfather     Cancer Brother     Diabetes Maternal Grandmother     Stroke Maternal Grandfather     Cirrhosis Neg Hx      No current facility-administered medications on file prior to encounter.      Current Outpatient Medications on File Prior to Encounter   Medication Sig Dispense Refill    apixaban (ELIQUIS) 5 mg Tab Take 1 tablet (5 mg total) by mouth 2 (two) times daily. 90 tablet 3    atorvastatin (LIPITOR) 40 MG tablet Take 1 tablet (40 mg total) by mouth once daily. 90 tablet 3    clopidogrel (PLAVIX) 75 mg tablet Take 1 tablet (75 mg total) by mouth once daily. 30 tablet 11    cyanocobalamin, vitamin B-12, 500 mcg Subl Place 1 tablet under the tongue every Monday.       furosemide (LASIX) 80 MG tablet Take 1 tablet (80 mg total) by mouth 2 (two) times daily. 60 tablet 11    insulin aspart U-100 (NOVOLOG) 100 unit/mL injection Inject 4 Units into the skin 3 (three) times daily before meals.      insulin detemir (LEVEMIR FLEXPEN SUBQ) Inject 12 Units into the skin once daily.       lipase-protease-amylase (CREON) 36,000-114,000- 180,000 unit CpDR Take 1 capsule by mouth 3 (three) times daily. 270 capsule 3    metoprolol succinate (TOPROL-XL) 50 MG 24 hr tablet Take 1 tablet (50 mg total) by mouth once daily. 90 tablet 3    omeprazole (PRILOSEC) 40 MG capsule Take 40 mg by mouth once daily.  8    ONETOUCH ULTRA TEST Strp 4 (four) times daily. Use to test blood sugar four times a day.  3    spironolactone (ALDACTONE) 25 MG tablet Take 25 mg by mouth 2 (two) times daily.      tamsulosin (FLOMAX) 0.4 mg Cap Take 1 capsule by  mouth once daily. Pt has not started taking as of 19.  0     Patient has no known allergies.  Social History     Socioeconomic History    Marital status:      Spouse name: Not on file    Number of children: Not on file    Years of education: Not on file    Highest education level: Not on file   Occupational History    Not on file   Social Needs    Financial resource strain: Not on file    Food insecurity:     Worry: Not on file     Inability: Not on file    Transportation needs:     Medical: Not on file     Non-medical: Not on file   Tobacco Use    Smoking status: Former Smoker     Packs/day: 2.00     Years: 30.00     Pack years: 60.00     Types: Cigarettes     Last attempt to quit: 2005     Years since quittin.4    Smokeless tobacco: Never Used   Substance and Sexual Activity    Alcohol use: No     Comment: started ~, reports 1 shot daily, max 3 shots daily, vague about alcohol consumption. Last drink 2018    Drug use: No    Sexual activity: Not on file   Lifestyle    Physical activity:     Days per week: Not on file     Minutes per session: Not on file    Stress: Not on file   Relationships    Social connections:     Talks on phone: Not on file     Gets together: Not on file     Attends Jewish service: Not on file     Active member of club or organization: Not on file     Attends meetings of clubs or organizations: Not on file     Relationship status: Not on file   Other Topics Concern    Not on file   Social History Narrative    Not on file       ROS:     Constitutional : pos fatigue  HEENT neg  Resp neg  Cardiac pos leg b/l leg swelling (R>L)  GI neg   neg  Neuro neg  Heme/Immune: neg  Endo neg  Skin neg    PHYSICAL EXAM:  Vitals:    19 1514   BP: (!) 96/56   Pulse: 76   Resp: 18   Temp: 98 °F (36.7 °C)         PHYSICAL EXAM:   general:  Somnolent, wakes up easily and answers questions however falls back to sleep  VS: triage VS reviewed  HEENT: NC/AT,  PERRL, EOMI  Neck: trachea midline  CV: RRR, no m/r/g, no LE pitting edema  Resp: CTAB  ABD:  Distended, right flank ecchymoses from paracentesis done earlier today, mild left upper and right upper quadrant pain the patient reports is chronic  Patient is not does not want to roll or seat up, unable to examine his back  Neuro: AAO x 3, 5/5 muscle strength in upper and lower extremities, sensation grossly intact, face symmetric, speech normal  Ext:  Right lower extremity edema and erythema, warm to palpation, no crepitus.  Left leg is also with pitting edema however right leg is much more swollen          DATA & INTERVENTIONS:    LABS reviewed:  Labs Reviewed   COMPREHENSIVE METABOLIC PANEL - Abnormal; Notable for the following components:       Result Value    Sodium 135 (*)     Potassium 3.3 (*)     Glucose 62 (*)     BUN, Bld 58 (*)     Creatinine 2.3 (*)     Calcium 7.3 (*)     Total Protein 4.9 (*)     Albumin 1.7 (*)     eGFR if  33.4 (*)     eGFR if non  28.9 (*)     All other components within normal limits   CBC W/ AUTO DIFFERENTIAL - Abnormal; Notable for the following components:    RBC 3.06 (*)     Hemoglobin 9.0 (*)     Hematocrit 27.7 (*)     RDW 14.6 (*)     Gran% 79.1 (*)     Lymph% 15.2 (*)     All other components within normal limits   B-TYPE NATRIURETIC PEPTIDE - Abnormal; Notable for the following components:     (*)     All other components within normal limits   POCT GLUCOSE - Abnormal; Notable for the following components:    POCT Glucose 43 (*)     All other components within normal limits   POCT GLUCOSE - Abnormal; Notable for the following components:    POCT Glucose 123 (*)     All other components within normal limits   CULTURE, BLOOD   CULTURE, BLOOD   TROPONIN I   PROTIME-INR   MAGNESIUM   URINALYSIS, REFLEX TO URINE CULTURE    Narrative:     Preferred Collection Type->Urine, Clean Catch   POCT GLUCOSE MONITORING CONTINUOUS       RADIOLOGY  reviewed:  Imaging Results          CT Head Without Contrast (Final result)  Result time 06/25/19 17:52:57    Final result by Gonsalo Bland MD (06/25/19 17:52:57)                 Impression:      1. No acute intracranial abnormalities, noting sequela of chronic microvascular ischemic change and senescent change.      Electronically signed by: Gonsalo Bland MD  Date:    06/25/2019  Time:    17:52             Narrative:    EXAMINATION:  CT HEAD WITHOUT CONTRAST    CLINICAL HISTORY:  Decreased alertness;    TECHNIQUE:  Low dose axial images were obtained through the head.  Coronal and sagittal reformations were also performed. Contrast was not administered.    COMPARISON:  None.    FINDINGS:  There is generalized cerebral volume loss.  There is hypoattenuation in a periventricular fashion, likely sequela of chronic microvascular ischemic change.  There is no evidence of acute major vascular territory infarct, hemorrhage, or mass.  There is no hydrocephalus.  There are no abnormal extra-axial fluid collections.  The paranasal sinuses and mastoid air cells are clear, and there is no evidence of calvarial fracture.  The visualized soft tissues are unremarkable.                               X-Ray Chest AP Portable (Final result)  Result time 06/25/19 17:19:57    Final result by Gonsalo Bland MD (06/25/19 17:19:57)                 Impression:      1. No acute cardiopulmonary process, hypoventilatory exam.      Electronically signed by: Gonsalo Bland MD  Date:    06/25/2019  Time:    17:19             Narrative:    EXAMINATION:  XR CHEST AP PORTABLE    CLINICAL HISTORY:  LE edema;    TECHNIQUE:  Single frontal view of the chest was performed.    COMPARISON:  05/22/2019    FINDINGS:  Right central venous catheter tip projects over the distal SVC.  The cardiomediastinal silhouette is not enlarged, stable..  There is no pleural effusion.  The trachea is midline.  The lungs are symmetrically expanded bilaterally  without evidence of acute parenchymal process. No large focal consolidation seen.  There is no pneumothorax.  The osseous structures are remarkable for degenerative changes..                                MEDICATIONS/FLUIDS:  Medications   vancomycin (VANCOCIN) 2,500 mg in dextrose 5 % 500 mL IVPB (2,500 mg Intravenous New Bag 6/25/19 1708)   sodium chloride 0.9% bolus 500 mL (0 mLs Intravenous Stopped 6/25/19 1801)         MDM:  Jian Arrieta is a 63 y.o. year old male who presents to the ED complaining of feeling tired, febrile and diaphoretic last night, right lower extremity edema and pain in the last 2 days  Hypoglycemic on arrival for which he received orange juice    DDX includes but not limited to:  Cellulitis versus DVT versus sepsis versus intracranial bleed versus hypoglycemia    Labs ordered and interpreted:   Mild hyponatremia and hypokalemia, CKD at baseline  WBC within normal limits, hemoglobin 9 at baseline, platelets within normal limits    INR within normal limits  Troponin negative  Magnesium normal    CXR (ordered and independently reviewed):  No infiltrate or pulmonary edema  CT head no acute  DVT scan of the lower extremities negative for DVT    Repeat point of care glucose 123.  On re-evaluation patient awake, lethargy resolved      SisterNena would like to be present when the  comes to eval the patient, her phone no is [phone]    Case discussed with the consultant: Dr. Castillo ([facility])    Chart reviewed with multiple recent ED visits.  History of portal hypertension ascites requiring paracentesis    IMPRESSION:  1.)  Right lower extremity cellulitis  2.)  Hypoglycemia  3.  Failure to thrive, unable to perform ADLs    Dispo:   Observation    Critical Care Time: N/A     Meghan Berman MD  06/25/19 1966

## 2019-06-26 ENCOUNTER — TELEPHONE (OUTPATIENT)
Dept: NEUROLOGY | Facility: HOSPITAL | Age: 64
End: 2019-06-26

## 2019-06-26 ENCOUNTER — CLINICAL SUPPORT (OUTPATIENT)
Dept: CARDIOLOGY | Facility: CLINIC | Age: 64
DRG: 603 | End: 2019-06-26
Attending: EMERGENCY MEDICINE
Payer: COMMERCIAL

## 2019-06-26 ENCOUNTER — TELEPHONE (OUTPATIENT)
Dept: HEPATOLOGY | Facility: CLINIC | Age: 64
End: 2019-06-26

## 2019-06-26 PROBLEM — L03.115 CELLULITIS OF RIGHT LOWER EXTREMITY WITHOUT FOOT: Status: ACTIVE | Noted: 2019-06-26

## 2019-06-26 LAB
ALBUMIN SERPL BCP-MCNC: 1.3 G/DL (ref 3.5–5.2)
ALP SERPL-CCNC: 113 U/L (ref 55–135)
ALT SERPL W/O P-5'-P-CCNC: 22 U/L (ref 10–44)
ANION GAP SERPL CALC-SCNC: 5 MMOL/L (ref 8–16)
AST SERPL-CCNC: 24 U/L (ref 10–40)
BASOPHILS # BLD AUTO: 0.02 K/UL (ref 0–0.2)
BASOPHILS NFR BLD: 0.3 % (ref 0–1.9)
BILIRUB SERPL-MCNC: 0.2 MG/DL (ref 0.1–1)
BUN SERPL-MCNC: 55 MG/DL (ref 8–23)
CALCIUM SERPL-MCNC: 7.3 MG/DL (ref 8.7–10.5)
CHLORIDE SERPL-SCNC: 104 MMOL/L (ref 95–110)
CO2 SERPL-SCNC: 25 MMOL/L (ref 23–29)
CREAT SERPL-MCNC: 2 MG/DL (ref 0.5–1.4)
DIFFERENTIAL METHOD: ABNORMAL
EOSINOPHIL # BLD AUTO: 0.1 K/UL (ref 0–0.5)
EOSINOPHIL NFR BLD: 1.1 % (ref 0–8)
ERYTHROCYTE [DISTWIDTH] IN BLOOD BY AUTOMATED COUNT: 14.6 % (ref 11.5–14.5)
EST. GFR  (AFRICAN AMERICAN): 39.6 ML/MIN/1.73 M^2
EST. GFR  (NON AFRICAN AMERICAN): 34.3 ML/MIN/1.73 M^2
GLUCOSE SERPL-MCNC: 125 MG/DL (ref 70–110)
HCT VFR BLD AUTO: 25.8 % (ref 40–54)
HGB BLD-MCNC: 8.2 G/DL (ref 14–18)
IMM GRANULOCYTES # BLD AUTO: 0.02 K/UL (ref 0–0.04)
IMM GRANULOCYTES NFR BLD AUTO: 0.3 % (ref 0–0.5)
LYMPHOCYTES # BLD AUTO: 1.1 K/UL (ref 1–4.8)
LYMPHOCYTES NFR BLD: 14.8 % (ref 18–48)
MAGNESIUM SERPL-MCNC: 2.1 MG/DL (ref 1.6–2.6)
MCH RBC QN AUTO: 29 PG (ref 27–31)
MCHC RBC AUTO-ENTMCNC: 31.8 G/DL (ref 32–36)
MCV RBC AUTO: 91 FL (ref 82–98)
MONOCYTES # BLD AUTO: 0.4 K/UL (ref 0.3–1)
MONOCYTES NFR BLD: 5.9 % (ref 4–15)
NEUTROPHILS # BLD AUTO: 5.7 K/UL (ref 1.8–7.7)
NEUTROPHILS NFR BLD: 77.6 % (ref 38–73)
NRBC BLD-RTO: 0 /100 WBC
PHOSPHATE SERPL-MCNC: 5 MG/DL (ref 2.7–4.5)
PLATELET # BLD AUTO: 222 K/UL (ref 150–350)
PMV BLD AUTO: 10.2 FL (ref 9.2–12.9)
POCT GLUCOSE: 132 MG/DL (ref 70–110)
POCT GLUCOSE: 133 MG/DL (ref 70–110)
POCT GLUCOSE: 152 MG/DL (ref 70–110)
POCT GLUCOSE: 233 MG/DL (ref 70–110)
POTASSIUM SERPL-SCNC: 3.8 MMOL/L (ref 3.5–5.1)
PROT SERPL-MCNC: 4.2 G/DL (ref 6–8.4)
RBC # BLD AUTO: 2.83 M/UL (ref 4.6–6.2)
SODIUM SERPL-SCNC: 134 MMOL/L (ref 136–145)
WBC # BLD AUTO: 7.29 K/UL (ref 3.9–12.7)

## 2019-06-26 PROCEDURE — 63600175 PHARM REV CODE 636 W HCPCS: Mod: JG | Performed by: INTERNAL MEDICINE

## 2019-06-26 PROCEDURE — 25000003 PHARM REV CODE 250: Performed by: PHYSICIAN ASSISTANT

## 2019-06-26 PROCEDURE — 83735 ASSAY OF MAGNESIUM: CPT

## 2019-06-26 PROCEDURE — 94660 CPAP INITIATION&MGMT: CPT

## 2019-06-26 PROCEDURE — G0378 HOSPITAL OBSERVATION PER HR: HCPCS

## 2019-06-26 PROCEDURE — 80053 COMPREHEN METABOLIC PANEL: CPT

## 2019-06-26 PROCEDURE — 63600175 PHARM REV CODE 636 W HCPCS: Performed by: PHYSICIAN ASSISTANT

## 2019-06-26 PROCEDURE — 97161 PT EVAL LOW COMPLEX 20 MIN: CPT

## 2019-06-26 PROCEDURE — 84100 ASSAY OF PHOSPHORUS: CPT

## 2019-06-26 PROCEDURE — 93925 CV US DOPPLER ARTERIAL LEGS BILATERAL (CUPID ONLY): ICD-10-PCS | Mod: 26,,, | Performed by: INTERNAL MEDICINE

## 2019-06-26 PROCEDURE — 97530 THERAPEUTIC ACTIVITIES: CPT

## 2019-06-26 PROCEDURE — 94761 N-INVAS EAR/PLS OXIMETRY MLT: CPT

## 2019-06-26 PROCEDURE — 99900035 HC TECH TIME PER 15 MIN (STAT)

## 2019-06-26 PROCEDURE — 27000190 HC CPAP FULL FACE MASK W/VALVE

## 2019-06-26 PROCEDURE — S5571 INSULIN DISPOS PEN 3 ML: HCPCS | Performed by: PHYSICIAN ASSISTANT

## 2019-06-26 PROCEDURE — 93925 LOWER EXTREMITY STUDY: CPT | Mod: 50

## 2019-06-26 PROCEDURE — P9047 ALBUMIN (HUMAN), 25%, 50ML: HCPCS | Mod: JG | Performed by: INTERNAL MEDICINE

## 2019-06-26 PROCEDURE — 85025 COMPLETE CBC W/AUTO DIFF WBC: CPT

## 2019-06-26 PROCEDURE — 93925 LOWER EXTREMITY STUDY: CPT | Mod: 26,,, | Performed by: INTERNAL MEDICINE

## 2019-06-26 PROCEDURE — 99232 SBSQ HOSP IP/OBS MODERATE 35: CPT | Mod: ,,, | Performed by: INTERNAL MEDICINE

## 2019-06-26 PROCEDURE — 99232 PR SUBSEQUENT HOSPITAL CARE,LEVL II: ICD-10-PCS | Mod: ,,, | Performed by: INTERNAL MEDICINE

## 2019-06-26 RX ORDER — ALBUMIN HUMAN 250 G/1000ML
25 SOLUTION INTRAVENOUS 2 TIMES DAILY
Status: DISCONTINUED | OUTPATIENT
Start: 2019-06-26 | End: 2019-06-27 | Stop reason: HOSPADM

## 2019-06-26 RX ORDER — CLINDAMYCIN PHOSPHATE 600 MG/50ML
600 INJECTION, SOLUTION INTRAVENOUS
Status: DISCONTINUED | OUTPATIENT
Start: 2019-06-27 | End: 2019-06-27 | Stop reason: HOSPADM

## 2019-06-26 RX ADMIN — INSULIN DETEMIR 6 UNITS: 100 INJECTION, SOLUTION SUBCUTANEOUS at 09:06

## 2019-06-26 RX ADMIN — ACETAMINOPHEN 650 MG: 325 TABLET ORAL at 12:06

## 2019-06-26 RX ADMIN — SPIRONOLACTONE 25 MG: 25 TABLET, FILM COATED ORAL at 09:06

## 2019-06-26 RX ADMIN — TAMSULOSIN HYDROCHLORIDE 0.4 MG: 0.4 CAPSULE ORAL at 09:06

## 2019-06-26 RX ADMIN — CLOPIDOGREL 75 MG: 75 TABLET, FILM COATED ORAL at 09:06

## 2019-06-26 RX ADMIN — APIXABAN 5 MG: 5 TABLET, FILM COATED ORAL at 12:06

## 2019-06-26 RX ADMIN — APIXABAN 5 MG: 5 TABLET, FILM COATED ORAL at 09:06

## 2019-06-26 RX ADMIN — ALBUMIN HUMAN 25 G: 0.25 SOLUTION INTRAVENOUS at 09:06

## 2019-06-26 RX ADMIN — RAMELTEON 8 MG: 8 TABLET, FILM COATED ORAL at 12:06

## 2019-06-26 RX ADMIN — ATORVASTATIN CALCIUM 40 MG: 20 TABLET, FILM COATED ORAL at 09:06

## 2019-06-26 RX ADMIN — PANTOPRAZOLE SODIUM 40 MG: 40 TABLET, DELAYED RELEASE ORAL at 09:06

## 2019-06-26 RX ADMIN — POTASSIUM CHLORIDE 40 MEQ: 20 SOLUTION ORAL at 12:06

## 2019-06-26 NOTE — ASSESSMENT & PLAN NOTE
NSTEMI (non-ST elevated myocardial infarction)    - NSTEMI 3/2019 2/2 demand ischemia from afib.  [facility] (3/30/19) with multivessel dz and patent stents without need for intervention.   - c/w ASA, statin, plavix, BB  - f/w cardiology, Dr Carney

## 2019-06-26 NOTE — PLAN OF CARE
(SW) learned in morning huddle that Pt will likely discharge 6/27 or 6/28 and Pt's discharge plan is contingent on therapy recommendations. Once therapy recs received, SW will proceed accordingly.     SW will continue to follow and offer support as needed.      06/26/19 1156   Post-Acute Status   Post-Acute Authorization Placement   Post-Acute Placement Status Awaiting Therapy Documentation   Chelsea Fonseca SW  -[phone];

## 2019-06-26 NOTE — PLAN OF CARE
Problem: Adult Inpatient Plan of Care  Goal: Plan of Care Review  Outcome: Ongoing (interventions implemented as appropriate)  I have reviewed the plan of care with the patient, and I have answered all questions to the best of my ability and within my scope of practice. Patient's vital signs are within normal limits. He is AAOx4. He has been educated on his risk for falls. At the time of this writing, no fall with injury has occurred. Bed down in lowest position, call light within reach, side rails up x2. Bed alarm on, room near nurse's desk.

## 2019-06-26 NOTE — PLAN OF CARE
Problem: Adult Inpatient Plan of Care  Goal: Plan of Care Review  Recommendations  1.Continue to encourage adequate meal and oral supplement (Beneprotein) daily   2.Discontinue Beneprotein supplement if pt continues to be consistently noncompliant.  Goals: Meet >85% energy and protein needs daily until RD f/u  Nutrition Goal Status: new  Communication of RD Recs: other (comment)(POC review)

## 2019-06-26 NOTE — ASSESSMENT & PLAN NOTE
RLE pain, edema, erythema, warmth x2 days with f/c/diaphoresis concerning for cellulitis.   - no evidence of systemic infection  - Given vanc in ED.  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

## 2019-06-26 NOTE — ASSESSMENT & PLAN NOTE
Chronic problem exacerbated by progressive debility and malnutrition  - will check arterial doppler of BLE with weak pulses, but likely 2/2 diffuse edema  - elevate BLE

## 2019-06-26 NOTE — H&P
[facility] Medicine  History & Physical    Patient Name: Jian Arrieta  MRN: [id number]  Admission Date: 6/25/2019  Attending Physician: SELENA Castillo MD   Primary Care Provider: Leon Barajas [facility] Medicine Team: [facility] Carlos Parham PA-C     Patient information was obtained from patient, past medical records and ER records.     Subjective:     Principal Problem:Cellulitis of right lower extremity without foot    Chief Complaint:   Chief Complaint   Patient presents with    Leg Swelling     very very edematous legs , redness draining        HPI: Jian Arrieta is a 63 y.o. M with pmhx of chronic diastolic heart failure, CAD s/p PCI (x5) with subsequent CABG x3v (~2017 @[facility]), IDDM with neuropathy, FTT, HTN, h/o ETOH abuse, liver fibrosis c/b ascites, pAF, chronic LE lymphedema, chronic pancreatitis, former smoker of 2 PPD x30y (quit 2005) who presents to the ED for evaluation of worsening BLE edema with associated increased BLE pain (L>R), erythema, subjective fevers, chills, diaphoresis x2 days. Patient reports chronic lymphedema but this is worse than normal with associated 10 lb wt gain over the last 2 weeks.  He also reports frequent falls and may have hit right leg on ground. Patient denies orthopnea, abd pain, NVD, CP, SOB at rest.     Patient also reports CHURCH and bedbound status over the last 4-5 months with no improvement with HHC.  He is requesting to be further evaluated for SNF for NH as sister and wife can no longer care for him.     Seen by PCP Dr. Barajas on 6/21 requesting admission for progressive weakness, poor endurance, unable to manage medications and difficulty with ADLs and dx with FTT. Chronic LE lymphedema noted to be stable.   Patient underwent US guided paracentesis this afternoon and had 1.2 L removed.     ED: Afebrile, BP stable (SBP 90s), no leukocytosis.  H/H stable.  Na 135/K3.3/Cr2.3 (BL).   (BL <100)/trop .006.  BCx taken and given vancomycin for concern of cellulitis. CXR without acute changes. CTH negative for intracranial abnormality. XR R tib/fib without evidence of fracture.  US BLE without evidence of DVT.     Past Medical History:   Diagnosis Date    Alcohol abuse     Anasarca 1/28/2019    Arthropathy associated with neurological disorder 9/2/2015    Atherosclerosis     Charcot foot due to diabetes mellitus     Chronic combined systolic and diastolic heart failure 01/29/2019 1-28-19 Left VentricleModerate decreased ejection fraction at 30%. Normal left ventricular cavity size. Normal wall thickness observed. Grade I (mild) left ventricular diastolic dysfunction consistent with impaired relaxation. Normal left atrial pressure. Moderate, global hypokinesis(see wall scoring diagram). Right VentricleNormal cavity size, wall thickness and ejection fraction. Wall motion n    Chronic pancreatitis 1/28/2019    Colon polyps     approx 5 yrs ago    Coronary artery disease due to calcified coronary lesion 05/08/2015    5 stents on ASA      Diabetic polyneuropathy associated with type 2 diabetes mellitus 9/2/2015    Diverticulosis 1/28/2019    DM type 2 with diabetic peripheral neuropathy 2/4/2019    Essential hypertension 1/28/2019    Former smoker 8/26/2015    Healed ulcer of left foot on examination 6/20/2017    Hydrocele     approx 1.5 yrs ago    Hypoalbuminemia 2/4/2019    Lymphedema of both lower extremities 1/29/2019    Mixed hyperlipidemia 5/8/2015    Morbid obesity with BMI of 50.0-59.9, adult 5/8/2015    Onychomycosis of multiple toenails with type 2 diabetes mellitus and peripheral neuropathy 6/20/2017    Perianal cyst     approx 2 yrs ago    Pseudocyst of pancreas 1/28/2019 1-28-19 Liver has a cirrhotic morphology with no focal lesions.  Significant interval increase in ascites when compared to prior exam which may account for patient's abdominal distension.  Hypodense air-fluid  collection along the body of the pancreas which is slightly smaller when compared to prior CT.  Findings may relate to pancreatic necrosis with pancreatic pseudocysts with infected pseudocyst    Skin cancer     skin cancer    Sleep apnea 8/26/2015    Status post bariatric surgery 1/11/2016    Type 2 diabetes mellitus, with long-term current use of insulin 5/8/2015       Past Surgical History:   Procedure Laterality Date    ANGIOGRAM, CORONARY ARTERY Right 3/20/2019    Performed by Bob Duque MD at [facility] CATH LAB    ANGIOPLASTY      total x5 stents    Bypass graft study  3/20/2019    Performed by Bob [facility]que MD at [facility] CATH LAB    COLONOSCOPY N/A 10/6/2015    Performed by Shekhar Richards MD at [facility] ENDO (2ND FLR)    CORONARY ARTERY BYPASS GRAFT  2017    x3    CYST REMOVAL      GASTRECTOMY      GASTRECTOMY-SLEEVE-LAPAROSCOPIC - 98373 N/A 12/22/2015    Performed by Micheal Villavicencio Jr., MD at [facility] OR 2ND FLR    KNEE ARTHROSCOPY      perianal surgery      perianal cyst removed    pleurx N/A 5/17/2019    Performed by [facility] Diagnostic Provider at [facility] OR 2ND FLR       Review of patient's allergies indicates:  No Known Allergies    No current facility-administered medications on file prior to encounter.      Current Outpatient Medications on File Prior to Encounter   Medication Sig    apixaban (ELIQUIS) 5 mg Tab Take 1 tablet (5 mg total) by mouth 2 (two) times daily.    atorvastatin (LIPITOR) 40 MG tablet Take 1 tablet (40 mg total) by mouth once daily.    clopidogrel (PLAVIX) 75 mg tablet Take 1 tablet (75 mg total) by mouth once daily.    cyanocobalamin, vitamin B-12, 500 mcg Subl Place 1 tablet under the tongue every Monday.     furosemide (LASIX) 80 MG tablet Take 1 tablet (80 mg total) by mouth 2 (two) times daily.    insulin aspart U-100 (NOVOLOG) 100 unit/mL injection Inject 4 Units into the skin 3 (three) times daily before meals.    insulin detemir (LEVEMIR FLEXPEN SUBQ)  Inject 12 Units into the skin once daily.     lipase-protease-amylase (CREON) 36,000-114,000- 180,000 unit CpDR Take 1 capsule by mouth 3 (three) times daily.    metoprolol succinate (TOPROL-XL) 50 MG 24 hr tablet Take 1 tablet (50 mg total) by mouth once daily.    omeprazole (PRILOSEC) 40 MG capsule Take 40 mg by mouth once daily.    ONETOUCH ULTRA TEST Strp 4 (four) times daily. Use to test blood sugar four times a day.    spironolactone (ALDACTONE) 25 MG tablet Take 25 mg by mouth 2 (two) times daily.    tamsulosin (FLOMAX) 0.4 mg Cap Take 1 capsule by mouth once daily. Pt has not started taking as of 19.     Family History     Problem Relation (Age of Onset)    Cancer Mother, Father, Paternal Grandfather, Brother    Diabetes Maternal Grandmother    Heart disease Father    No Known Problems Paternal Grandmother    Obesity Sister    Parkinsonism Brother    Stroke Maternal Grandfather        Tobacco Use    Smoking status: Former Smoker     Packs/day: 2.00     Years: 30.00     Pack years: 60.00     Types: Cigarettes     Last attempt to quit: 2005     Years since quittin.4    Smokeless tobacco: Never Used   Substance and Sexual Activity    Alcohol use: No     Comment: started ~, reports 1 shot daily, max 3 shots daily, vague about alcohol consumption. Last drink 2018    Drug use: No    Sexual activity: Not on file     Review of Systems   Constitutional: Positive for chills, diaphoresis, fever (subjective) and unexpected weight change.   HENT: Negative for ear pain and sore throat.    Eyes: Negative for pain and visual disturbance.   Respiratory: Negative for cough and shortness of breath.         + CHURCH   Cardiovascular: Positive for leg swelling (BLE with weeping). Negative for chest pain and palpitations.   Gastrointestinal: Negative for abdominal pain, diarrhea, nausea and vomiting.   Endocrine: Positive for cold intolerance. Negative for heat intolerance.        Reduced perspiration,  chronically cold   Genitourinary: Positive for frequency (with diuretics). Negative for difficulty urinating, dysuria and urgency.   Musculoskeletal: Positive for gait problem (from BLE weakness). Negative for back pain.   Skin: Negative for rash and wound.        BLE pain  RLE red, swelling, weeping   Allergic/Immunologic: Negative for immunocompromised state.   Neurological: Positive for dizziness (with falls) and weakness (generalized). Negative for syncope.   Hematological: Bruises/bleeds easily.   Psychiatric/Behavioral: Negative for confusion and decreased concentration. The patient is not nervous/anxious.      Objective:     Vital Signs (Most Recent):  Temp: 98 °F (36.7 °C) (06/25/19 1514)  Pulse: 70 (06/25/19 2200)  Resp: 18 (06/25/19 2200)  BP: 101/64 (06/25/19 2200)  SpO2: 98 % (06/25/19 2200) Vital Signs (24h Range):  Temp:  [98 °F (36.7 °C)] 98 °F (36.7 °C)  Pulse:  [70-83] 70  Resp:  [18] 18  SpO2:  [98 %-100 %] 98 %  BP: ()/(54-64) 101/64     Weight: 122.5 kg (270 lb)  Body mass index is 42.29 kg/m².    Physical Exam   Constitutional: He is oriented to person, place, and time. He appears well-developed and well-nourished. No distress.   Chronically ill appearing male in NAD   HENT:   Head: Normocephalic and atraumatic.   Eyes: Pupils are equal, round, and reactive to light. Conjunctivae and EOM are normal.   Neck: Normal range of motion. Neck supple.   Cardiovascular: Normal rate, regular rhythm, normal heart sounds and intact distal pulses.   Weak DP pulses and cold digits to BLE   Pulmonary/Chest: Effort normal and breath sounds normal.   Abdominal: Soft. Bowel sounds are normal. He exhibits no distension. There is no tenderness.   Musculoskeletal: Normal range of motion. He exhibits edema (4+ severe pitting edema to BLE).   STR 5/5 to BUE  STR 5/5 to ankles, (L>R)   Neurological: He is alert and oriented to person, place, and time.   No gross focal deficits, but severely debilated   Skin: Skin  is dry. He is not diaphoretic. There is pallor (pallor to BLE).   RLE skin changes with erythema, edema, warmth from distal R knee to proximal ankle. Weeping noted to lower shin, exacerbated by palpation. 4+ pitting edema  Multiple BUE ecchymosis and healing eschars   Psychiatric: He has a normal mood and affect. His behavior is normal. Judgment and thought content normal. His mood appears not anxious. His speech is delayed (mildly delayed; poor hearing). Cognition and memory are normal. He is attentive.   Nursing note and vitals reviewed.        CRANIAL NERVES     CN III, IV, VI   Pupils are equal, round, and reactive to light.  Extraocular motions are normal.        Significant Labs:   CBC:   Recent Labs   Lab 06/25/19  1643   WBC 9.25   HGB 9.0*   HCT 27.7*        CMP:   Recent Labs   Lab 06/25/19  1643   *   K 3.3*      CO2 24   GLU 62*   BUN 58*   CREATININE 2.3*   CALCIUM 7.3*   PROT 4.9*   ALBUMIN 1.7*   BILITOT 0.3   ALKPHOS 125   AST 31   ALT 30   ANIONGAP 8   EGFRNONAA 28.9*     Urine Studies:   Recent Labs   Lab 06/25/19  1734   COLORU Yellow   APPEARANCEUA Clear   PHUR 5.0   SPECGRAV 1.010   PROTEINUA Negative   GLUCUA Negative   KETONESU Negative   BILIRUBINUA Negative   OCCULTUA Negative   NITRITE Negative   LEUKOCYTESUR Negative       Significant Imaging: I have reviewed all pertinent imaging results/findings within the past 24 hours.     X-ray Tibia Fibula 2 View Right    Result Date: 6/25/2019  EXAMINATION: XR TIBIA FIBULA 2 VIEW RIGHT CLINICAL HISTORY: Localized edema TECHNIQUE: AP and lateral views of the right tibia and fibula were performed. COMPARISON: None. FINDINGS: No evidence of acute fracture or dislocation.  No focal bony erosion.  Chronic changes noted involving the fibular shaft.  Normal alignment at the ankle and knee.  Moderate to advanced degenerative changes in the midfoot.  Diffuse soft tissue edema about the entire lower extremity.     Diffuse nonspecific soft  tissue edema.  Cellulitis could be considered if there is clinical concern for infectious process.  No displaced fracture or bony erosion.      Ct Head Without Contrast    Result Date: 6/25/2019  EXAMINATION: CT HEAD WITHOUT CONTRAST CLINICAL HISTORY: Decreased alertness; TECHNIQUE: Low dose axial images were obtained through the head.  Coronal and sagittal reformations were also performed. Contrast was not administered. COMPARISON: None. FINDINGS: There is generalized cerebral volume loss.  There is hypoattenuation in a periventricular fashion, likely sequela of chronic microvascular ischemic change.  There is no evidence of acute major vascular territory infarct, hemorrhage, or mass.  There is no hydrocephalus.  There are no abnormal extra-axial fluid collections.  The paranasal sinuses and mastoid air cells are clear, and there is no evidence of calvarial fracture.  The visualized soft tissues are unremarkable.     1. No acute intracranial abnormalities, noting sequela of chronic microvascular ischemic change and senescent change.     X-ray Chest Ap Portable    Result Date: 6/25/2019  EXAMINATION: XR CHEST AP PORTABLE CLINICAL HISTORY: LE edema; TECHNIQUE: Single frontal view of the chest was performed. COMPARISON: 05/22/2019 FINDINGS: Right central venous catheter tip projects over the distal SVC.  The cardiomediastinal silhouette is not enlarged, stable..  There is no pleural effusion.  The trachea is midline.  The lungs are symmetrically expanded bilaterally without evidence of acute parenchymal process. No large focal consolidation seen.  There is no pneumothorax.  The osseous structures are remarkable for degenerative changes..     1. No acute cardiopulmonary process, hypoventilatory exam.    Us Lower Extremity Veins Bilateral    Result Date: 6/25/2019  EXAMINATION: US LOWER EXTREMITY VEINS BILATERAL CLINICAL HISTORY: Localized edema TECHNIQUE: Duplex and color flow Doppler and dynamic compression was  performed of the bilateral lower extremity veins was performed. COMPARISON: None. FINDINGS: Right thigh veins: The common femoral, femoral, popliteal, upper greater saphenous, and deep femoral veins are patent and free of thrombus. The veins are normally compressible and have normal phasic flow and augmentation response. Right calf veins: The visualized calf veins are patent. Left thigh veins: The common femoral, femoral, popliteal, upper greater saphenous, and deep femoral veins are patent and free of thrombus. The veins are normally compressible and have normal phasic flow and augmentation response. Left calf veins: The visualized calf veins are patent. Miscellaneous: Soft tissue thickening and swelling noted within both legs.     No evidence of deep venous thrombosis in either lower extremity.     Assessment/Plan:     * Cellulitis of right lower extremity without foot  RLE pain, edema, erythema, warmth x2 days with f/c/diaphoresis concerning for cellulitis.   - no evidence of systemic infection  - Given vanc in ED.  Will c/w clindamycin TID  - BLE US without evidence of DVT  - Wound care consulted for associated weeping lymphedema to RLE  - f/u BCx    Hepatic fibrosis  Chronic pancreatitis  Other ascites  Stage 1 hepatic fibrosis (CT with evidence of cirrhosis, but biopsy negative)  - f/w general surgery/neuroendocrine and receives twice weekly paracenteses with IR  - ascites c/b poor response to diuretics and noncompliance to salt/fluid restriction  - c/w diuretics  - plts and INR WNL    Discharge planning issues  Family have been trying for several week to obtain SNF placement for patient as patient's condition has declined and has multiple comorbidities   - will need assistance from SW/CM for placement  - PT/OT consulted    Chronic diastolic heart failure  BNP elevated above baseline and BLE edema present, but appears compensated  - on RA on admit satting at 100%  - Last echo 5/23/19 shows EF 65%, (--)DD but  poor quality  - continue BB, lasix 80 mg PO BID, aldactone 25 mg PO BID  - monitor daily weights, strict I/Os, oxygen requirements  - Na restricted/fluid restricted diet 1500 ml    Coronary artery disease due to calcified coronary lesion  NSTEMI (non-ST elevated myocardial infarction)    - NSTEMI 3/2019 2/2 demand ischemia from afib.  [facility] (3/30/19) with multivessel dz and patent stents without need for intervention.   - c/w ASA, statin, plavix, BB  - f/w cardiology, Dr Carney    Malnutrition of moderate degree  Hypoalbuminemia  - Albumin 1.7 (BL); total protein 4.9  - dietary consulted  - PO supplements TIDWM    Paroxysmal atrial fibrillation  Well controlled on toprol  - c/w eliquis BID  - tenuous BP; will monitor for any changes in BB  - CHADSVASC 4  - monitor on tele    Stage 3 chronic kidney disease  Baseline renal function  Estimated Creatinine Clearance: 40.7 mL/min (A) (based on SCr of 2.3 mg/dL (H)).   - avoid nephrotoxic agents particularly in setting of liver dz  - renally dose medications  - daily cmp    DM type 2 with diabetic peripheral neuropathy  Lab Results   Component Value Date    HGBA1C 7.1 (H) 05/23/2019   chronic, well controlled  - home regimen: detemir 12U daily, aspart 4 U TIDWM  - inpatient regimen: detemir 6 U daily, low dose SSI, ACHS acchuchecks  - Diabetic diet 2000 calories    Decreased mobility  PT/OT consulted    Lymphedema of both lower extremities  Chronic problem exacerbated by progressive debility and malnutrition  - will check arterial doppler of BLE with weak pulses, but likely 2/2 diffuse edema  - elevate BLE    Sleep apnea  CPA QHS    VTE Risk Mitigation (From admission, onward)        Ordered     apixaban tablet 5 mg  2 times daily      06/25/19 4649             Carlos Parham PA-C  Department of Hospital Medicine   [facility]

## 2019-06-26 NOTE — TELEPHONE ENCOUNTER
----- Message from Sukhwinder Jordan sent at 6/25/2019  2:58 PM CDT -----  Contact: Nena (sister)/[phone]  Nena called to request Dr. Rowe recommend an Internal Medicine doctor for the patient to see.    Please call [phone] to discuss today.

## 2019-06-26 NOTE — PROGRESS NOTES
"  [facility]  Adult Nutrition  Consult Note    SUMMARY     Recommendations    1.Continue to encourage adequate meal and oral supplement (Beneprotein) daily   2.Discontinue Beneprotein supplement if pt continues to be consistently noncompliant.  Goals: Meet >85% energy and protein needs daily until RD f/u  Nutrition Goal Status: new  Communication of RD Recs: other (comment)(POC review)    Reason for Assessment    Reason For Assessment: consult  Diagnosis: other (see comments) cellulitis R) lower extremity w/o foot  Relevant Medical History: chronic diastolic heart failure, CAD s/p PCI (x5) with subsequent CABG x3v (~2017 @[facility]), T2DM with neuropathy, HTN, h/o ETOH abuse, liver fibrosis c/b ascites, pAF, chronic LE lymphedema, chronic pancreatitis, sleep apnea, skin cancer, morbid obesity, pseudocyst of pancreas, gastric sleeve sx (2015), angiogram (3/2019)  Interdisciplinary Rounds: did not attend  General Information Comments: S/p removing 1.2L fluid via paracentesis (6/25/19).  Pt's renal fx is at baseline as per chart. Pt clarified that his appetite was poor PTA, but this has greatly improved since (and reports currently eating approximately 100% of his meals).  Pt attributes this increase in appetite to liking the food here.Pt had not been consuming the Beneprotein TID as ordered, claiming he "didn't know how to do it." I educated the pt about how to consume the supplement and the importance of doing so. Pt did not specify his UBW; chart wt hx indicates a UBW between 270-280#.  Pt weight 127 kg as of 6/18/19, indicating significant wt loss of 3.8 kg (2.5% BW).  NFPE was performed today (6/26/19), findings indicated mild muscle and subcutaneous fat loss.  This with >2% wt loss x1 wk, 3+ moderate edema, and decreased energy intake (<75%) for at least 1 wk PTA, qualifies the pt for a dx of moderate malnutrition.  See malnutrition section for details.  Nutrition Discharge Planning: adequate " "nutrient intake >85% EEN/EPN    Nutrition Risk Screen    Nutrition Risk Screen: no indicators present    Nutrition/Diet History    Spiritual, Cultural Beliefs, Oriental orthodox Practices, Values that Affect Care: no  Food Allergies: NKFA  Factors Affecting Nutritional Intake: diarrhea, other (see comments), abdominal pain(general discomfort, increased BM, sharp pain in ribs)    Anthropometrics    Temp: 96.2 °F (35.7 °C)  Height Method: Stated  Height: 5' 7" (170.2 cm)  Height (inches): 67 in  Weight Method: Bed Scale  Weight: 124 kg (273 lb 5.9 oz)  Weight (lb): 273.37 lb  Ideal Body Weight (IBW), Male: 148 lb  % Ideal Body Weight, Male (lb): 182.43 lb  BMI (Calculated): 42.4  Weight Loss: unintentional  Usual Body Weight (UBW), k kg(as of )  Weight Change Amount: 6 lb 9.8 oz  % Usual Body Weight: 97.84  % Weight Change From Usual Weight: -2.36 %       Lab/Procedures/Meds    Pertinent Labs Reviewed: reviewed  Pertinent Labs Comments: Na 134, BUN 55, Crt 2.4, Glucose 125, Alb 1.3, Phos 5, A1C 7.1  Pertinent Medications Reviewed: reviewed  Pertinent Medications Comments: Abx, apixaban, statin, furosemide, insulin, metoprolol, spironolactone, tamsulosin      Estimated/Assessed Needs    Weight Used For Calorie Calculations: 124 kg (273 lb 5.9 oz)  Energy Calorie Requirements (kcal): 2000(MSJ without AF 2/2 obesity)  Energy Need Method: [place]  Protein Requirements: 124-150g (1-1.2g/kg)  Weight Used For Protein Calculations: 124 kg (273 lb 5.9 oz)  Fluid Requirements (mL): (per MD)  Estimated Fluid Requirement Method: RDA Method  RDA Method (mL):   CHO Requirement: 250g      Nutrition Prescription Ordered    Current Diet Order: 2000 ilene Diabetic; cardiac (low Na/Chol), 1500 mL fluid  Oral Nutrition Supplement: Beneprotein (TID)    Evaluation of Received Nutrient/Fluid Intake    I/O: reviewed  Energy Calories Required: meeting needs  Protein Required: meeting needs  Fluid Required: (per MD)  Comments: LBM " 6/25  Tolerance: tolerating  % Intake of Estimated Energy Needs: 75 - 100 %  % Meal Intake: 75 - 100 % per pt    Nutrition Risk    Level of Risk/Frequency of Follow-up: low     Assessment and Plan  Nutrition Problem  Malnutrition (Moderate Malnutrition in the Context of Acute Illness or Injury    Related to (etiology):   Inadequate nutrient intake (<85% EEN/EPN)    Signs and Symptoms (as evidenced by):   Mild muscle wasting and subcutaneous fat loss, >2% wt loss x1 wk, 3+ moderate edema, and decreased energy intake (<75%) for at least 1 wk PTA    Interventions:  Collaboration of Providers  Commercial Beverage    Nutrition Diagnosis Status:   New         Monitor and Evaluation    Food and Nutrient Intake: energy intake, food and beverage intake  Food and Nutrient Adminstration: diet order  Knowledge/Beliefs/Attitudes: food and nutrition knowledge/skill, beliefs and attitudes  Physical Activity and Function: nutrition-related ADLs and IADLs  Anthropometric Measurements: height/length, weight, weight change, body mass index  Biochemical Data, Medical Tests and Procedures: electrolyte and renal panel, gastrointestinal profile, glucose/endocrine profile, inflammatory profile, lipid profile  Nutrition-Focused Physical Findings: overall appearance     Malnutrition Assessment             Weight Loss (Malnutrition): 1-2% in 1 week(2.5% x1 wk)   Energy Intake (Malnutrition): less than 75% for greater than 7 days  Orbital Region (Subcutaneous Fat Loss): mild depletion  Upper Arm Region (Subcutaneous Fat Loss): mild depletion   Hoahaoism Region (Muscle Loss): moderate depletion  Clavicle Bone Region (Muscle Loss): mild depletion  Clavicle and Acromion Bone Region (Muscle Loss): mild depletion  Dorsal Hand (Muscle Loss): mild depletion  Anterior Thigh Region (Muscle Loss): mild depletion   Edema (Fluid Accumulation): 3-->moderate(3+ edema bilateral feet/legs, cellulitis of R) leg)   Subcutaneous Fat Loss (Final Summary): mild  protein-calorie malnutrition  Muscle Loss Evaluation (Final Summary): mild protein-calorie malnutrition  Fluid Accumulation Evaluation: moderate(3+ edema bilateral feet/legs, cellulitis of R) leg)    Moderate Weight Loss (Malnutrition): 1% to 2% in 1 week(2.5% x 1wk)    Nutrition Follow-Up    RD Follow-up?: Yes

## 2019-06-26 NOTE — PLAN OF CARE
Problem: Physical Therapy Goal  Goal: Physical Therapy Goal  Goals to be met by: 19     Patient will increase functional independence with mobility by performin. Supine to sit with Contact Guard Assistance  2. Sit to supine with MInimal Assistance  3. Sit to stand transfer with Stand-by Assistance with RW  4. Bed to chair transfer with Stand-by Assistance using Rolling Walker  5. Gait  x 30 feet with Contact Guard Assistance using Rolling Walker.   6. Stand for 3 minutes with Stand-by Assistance using Rolling Walker  7. Lower extremity exercise program x20 reps  with assistance as needed    PT arnaldo completed. Aimee Lal, PT 2019

## 2019-06-26 NOTE — SUBJECTIVE & OBJECTIVE
Past Medical History:   Diagnosis Date    Alcohol abuse     Anasarca 1/28/2019    Arthropathy associated with neurological disorder 9/2/2015    Atherosclerosis     Charcot foot due to diabetes mellitus     Chronic combined systolic and diastolic heart failure 01/29/2019 1-28-19 Left VentricleModerate decreased ejection fraction at 30%. Normal left ventricular cavity size. Normal wall thickness observed. Grade I (mild) left ventricular diastolic dysfunction consistent with impaired relaxation. Normal left atrial pressure. Moderate, global hypokinesis(see wall scoring diagram). Right VentricleNormal cavity size, wall thickness and ejection fraction. Wall motion n    Chronic pancreatitis 1/28/2019    Colon polyps     approx 5 yrs ago    Coronary artery disease due to calcified coronary lesion 05/08/2015    5 stents on ASA      Diabetic polyneuropathy associated with type 2 diabetes mellitus 9/2/2015    Diverticulosis 1/28/2019    DM type 2 with diabetic peripheral neuropathy 2/4/2019    Essential hypertension 1/28/2019    Former smoker 8/26/2015    Healed ulcer of left foot on examination 6/20/2017    Hydrocele     approx 1.5 yrs ago    Hypoalbuminemia 2/4/2019    Lymphedema of both lower extremities 1/29/2019    Mixed hyperlipidemia 5/8/2015    Morbid obesity with BMI of 50.0-59.9, adult 5/8/2015    Onychomycosis of multiple toenails with type 2 diabetes mellitus and peripheral neuropathy 6/20/2017    Perianal cyst     approx 2 yrs ago    Pseudocyst of pancreas 1/28/2019 1-28-19 Liver has a cirrhotic morphology with no focal lesions.  Significant interval increase in ascites when compared to prior exam which may account for patient's abdominal distension.  Hypodense air-fluid collection along the body of the pancreas which is slightly smaller when compared to prior CT.  Findings may relate to pancreatic necrosis with pancreatic pseudocysts with infected pseudocyst    Skin cancer     skin  cancer    Sleep apnea 8/26/2015    Status post bariatric surgery 1/11/2016    Type 2 diabetes mellitus, with long-term current use of insulin 5/8/2015       Past Surgical History:   Procedure Laterality Date    ANGIOGRAM, CORONARY ARTERY Right 3/20/2019    Performed by Bob Duque [facility] at [facility] CATH LAB    ANGIOPLASTY      total x5 stents    Bypass graft study  3/20/2019    Performed by Bob [facility]que MD at [facility] CATH LAB    COLONOSCOPY N/A 10/6/2015    Performed by Shekhar Richards MD at [facility] ENDO (2ND FLR)    CORONARY ARTERY BYPASS GRAFT  2017    x3    CYST REMOVAL      GASTRECTOMY      GASTRECTOMY-SLEEVE-LAPAROSCOPIC - 75284 N/A 12/22/2015    Performed by Micheal Villavicencio Jr., MD at [facility] OR 2ND FLR    KNEE ARTHROSCOPY      perianal surgery      perianal cyst removed    pleurx N/A 5/17/2019    Performed by [facility] Diagnostic Provider at [facility] OR 2ND FLR       Review of patient's allergies indicates:  No Known Allergies    No current facility-administered medications on file prior to encounter.      Current Outpatient Medications on File Prior to Encounter   Medication Sig    apixaban (ELIQUIS) 5 mg Tab Take 1 tablet (5 mg total) by mouth 2 (two) times daily.    atorvastatin (LIPITOR) 40 MG tablet Take 1 tablet (40 mg total) by mouth once daily.    clopidogrel (PLAVIX) 75 mg tablet Take 1 tablet (75 mg total) by mouth once daily.    cyanocobalamin, vitamin B-12, 500 mcg Subl Place 1 tablet under the tongue every Monday.     furosemide (LASIX) 80 MG tablet Take 1 tablet (80 mg total) by mouth 2 (two) times daily.    insulin aspart U-100 (NOVOLOG) 100 unit/mL injection Inject 4 Units into the skin 3 (three) times daily before meals.    insulin detemir (LEVEMIR FLEXPEN SUBQ) Inject 12 Units into the skin once daily.     lipase-protease-amylase (CREON) 36,000-114,000- 180,000 unit CpDR Take 1 capsule by mouth 3 (three) times daily.    metoprolol succinate (TOPROL-XL) 50 MG 24 hr tablet Take 1  tablet (50 mg total) by mouth once daily.    omeprazole (PRILOSEC) 40 MG capsule Take 40 mg by mouth once daily.    ONETOUCH ULTRA TEST Strp 4 (four) times daily. Use to test blood sugar four times a day.    spironolactone (ALDACTONE) 25 MG tablet Take 25 mg by mouth 2 (two) times daily.    tamsulosin (FLOMAX) 0.4 mg Cap Take 1 capsule by mouth once daily. Pt has not started taking as of 19.     Family History     Problem Relation (Age of Onset)    Cancer Mother, Father, Paternal Grandfather, Brother    Diabetes Maternal Grandmother    Heart disease Father    No Known Problems Paternal Grandmother    Obesity Sister    Parkinsonism Brother    Stroke Maternal Grandfather        Tobacco Use    Smoking status: Former Smoker     Packs/day: 2.00     Years: 30.00     Pack years: 60.00     Types: Cigarettes     Last attempt to quit: 2005     Years since quittin.4    Smokeless tobacco: Never Used   Substance and Sexual Activity    Alcohol use: No     Comment: started ~, reports 1 shot daily, max 3 shots daily, vague about alcohol consumption. Last drink 2018    Drug use: No    Sexual activity: Not on file     Review of Systems   Constitutional: Positive for chills, diaphoresis, fever (subjective) and unexpected weight change.   HENT: Negative for ear pain and sore throat.    Eyes: Negative for pain and visual disturbance.   Respiratory: Negative for cough and shortness of breath.         + CHURCH   Cardiovascular: Positive for leg swelling (BLE with weeping). Negative for chest pain and palpitations.   Gastrointestinal: Negative for abdominal pain, diarrhea, nausea and vomiting.   Endocrine: Positive for cold intolerance. Negative for heat intolerance.        Reduced perspiration, chronically cold   Genitourinary: Positive for frequency (with diuretics). Negative for difficulty urinating, dysuria and urgency.   Musculoskeletal: Positive for gait problem (from BLE weakness). Negative for back pain.    Skin: Negative for rash and wound.        BLE pain  RLE red, swelling, weeping   Allergic/Immunologic: Negative for immunocompromised state.   Neurological: Positive for dizziness (with falls) and weakness (generalized). Negative for syncope.   Hematological: Bruises/bleeds easily.   Psychiatric/Behavioral: Negative for confusion and decreased concentration. The patient is not nervous/anxious.      Objective:     Vital Signs (Most Recent):  Temp: 98 °F (36.7 °C) (06/25/19 1514)  Pulse: 70 (06/25/19 2200)  Resp: 18 (06/25/19 2200)  BP: 101/64 (06/25/19 2200)  SpO2: 98 % (06/25/19 2200) Vital Signs (24h Range):  Temp:  [98 °F (36.7 °C)] 98 °F (36.7 °C)  Pulse:  [70-83] 70  Resp:  [18] 18  SpO2:  [98 %-100 %] 98 %  BP: ()/(54-64) 101/64     Weight: 122.5 kg (270 lb)  Body mass index is 42.29 kg/m².    Physical Exam   Constitutional: He is oriented to person, place, and time. He appears well-developed and well-nourished. No distress.   Chronically ill appearing male in NAD   HENT:   Head: Normocephalic and atraumatic.   Eyes: Pupils are equal, round, and reactive to light. Conjunctivae and EOM are normal.   Neck: Normal range of motion. Neck supple.   Cardiovascular: Normal rate, regular rhythm, normal heart sounds and intact distal pulses.   Weak DP pulses and cold digits to BLE   Pulmonary/Chest: Effort normal and breath sounds normal.   Abdominal: Soft. Bowel sounds are normal. He exhibits no distension. There is no tenderness.   Musculoskeletal: Normal range of motion. He exhibits edema (4+ severe pitting edema to BLE).   STR 5/5 to BUE  STR 5/5 to ankles, (L>R)   Neurological: He is alert and oriented to person, place, and time.   No gross focal deficits, but severely debilated   Skin: Skin is dry. He is not diaphoretic. There is pallor (pallor to BLE).   RLE skin changes with erythema, edema, warmth from distal R knee to proximal ankle. Weeping noted to lower shin, exacerbated by palpation. 4+ pitting  edema  Multiple BUE ecchymosis and healing eschars   Psychiatric: He has a normal mood and affect. His behavior is normal. Judgment and thought content normal. His mood appears not anxious. His speech is delayed (mildly delayed; poor hearing). Cognition and memory are normal. He is attentive.   Nursing note and vitals reviewed.        CRANIAL NERVES     CN III, IV, VI   Pupils are equal, round, and reactive to light.  Extraocular motions are normal.        Significant Labs:   CBC:   Recent Labs   Lab 06/25/19  1643   WBC 9.25   HGB 9.0*   HCT 27.7*        CMP:   Recent Labs   Lab 06/25/19  1643   *   K 3.3*      CO2 24   GLU 62*   BUN 58*   CREATININE 2.3*   CALCIUM 7.3*   PROT 4.9*   ALBUMIN 1.7*   BILITOT 0.3   ALKPHOS 125   AST 31   ALT 30   ANIONGAP 8   EGFRNONAA 28.9*     Urine Studies:   Recent Labs   Lab 06/25/19  1734   COLORU Yellow   APPEARANCEUA Clear   PHUR 5.0   SPECGRAV 1.010   PROTEINUA Negative   GLUCUA Negative   KETONESU Negative   BILIRUBINUA Negative   OCCULTUA Negative   NITRITE Negative   LEUKOCYTESUR Negative       Significant Imaging: I have reviewed all pertinent imaging results/findings within the past 24 hours.     X-ray Tibia Fibula 2 View Right    Result Date: 6/25/2019  EXAMINATION: XR TIBIA FIBULA 2 VIEW RIGHT CLINICAL HISTORY: Localized edema TECHNIQUE: AP and lateral views of the right tibia and fibula were performed. COMPARISON: None. FINDINGS: No evidence of acute fracture or dislocation.  No focal bony erosion.  Chronic changes noted involving the fibular shaft.  Normal alignment at the ankle and knee.  Moderate to advanced degenerative changes in the midfoot.  Diffuse soft tissue edema about the entire lower extremity.     Diffuse nonspecific soft tissue edema.  Cellulitis could be considered if there is clinical concern for infectious process.  No displaced fracture or bony erosion.      Ct Head Without Contrast    Result Date: 6/25/2019  EXAMINATION: CT HEAD  WITHOUT CONTRAST CLINICAL HISTORY: Decreased alertness; TECHNIQUE: Low dose axial images were obtained through the head.  Coronal and sagittal reformations were also performed. Contrast was not administered. COMPARISON: None. FINDINGS: There is generalized cerebral volume loss.  There is hypoattenuation in a periventricular fashion, likely sequela of chronic microvascular ischemic change.  There is no evidence of acute major vascular territory infarct, hemorrhage, or mass.  There is no hydrocephalus.  There are no abnormal extra-axial fluid collections.  The paranasal sinuses and mastoid air cells are clear, and there is no evidence of calvarial fracture.  The visualized soft tissues are unremarkable.     1. No acute intracranial abnormalities, noting sequela of chronic microvascular ischemic change and senescent change.     X-ray Chest Ap Portable    Result Date: 6/25/2019  EXAMINATION: XR CHEST AP PORTABLE CLINICAL HISTORY: LE edema; TECHNIQUE: Single frontal view of the chest was performed. COMPARISON: 05/22/2019 FINDINGS: Right central venous catheter tip projects over the distal SVC.  The cardiomediastinal silhouette is not enlarged, stable..  There is no pleural effusion.  The trachea is midline.  The lungs are symmetrically expanded bilaterally without evidence of acute parenchymal process. No large focal consolidation seen.  There is no pneumothorax.  The osseous structures are remarkable for degenerative changes..     1. No acute cardiopulmonary process, hypoventilatory exam.    Us Lower Extremity Veins Bilateral    Result Date: 6/25/2019  EXAMINATION: US LOWER EXTREMITY VEINS BILATERAL CLINICAL HISTORY: Localized edema TECHNIQUE: Duplex and color flow Doppler and dynamic compression was performed of the bilateral lower extremity veins was performed. COMPARISON: None. FINDINGS: Right thigh veins: The common femoral, femoral, popliteal, upper greater saphenous, and deep femoral veins are patent and free of  thrombus. The veins are normally compressible and have normal phasic flow and augmentation response. Right calf veins: The visualized calf veins are patent. Left thigh veins: The common femoral, femoral, popliteal, upper greater saphenous, and deep femoral veins are patent and free of thrombus. The veins are normally compressible and have normal phasic flow and augmentation response. Left calf veins: The visualized calf veins are patent. Miscellaneous: Soft tissue thickening and swelling noted within both legs.     No evidence of deep venous thrombosis in either lower extremity.

## 2019-06-26 NOTE — ASSESSMENT & PLAN NOTE
BNP elevated above baseline and BLE edema present, but appears compensated  - on RA on admit satting at 100%  - Last echo 5/23/19 shows EF 65%, (--)DD but poor quality  - continue BB, lasix 80 mg PO BID, aldactone 25 mg PO BID  - monitor daily weights, strict I/Os, oxygen requirements  - Na restricted/fluid restricted diet 1500 ml

## 2019-06-26 NOTE — HPI
Jian Arrieta is a 63 y.o. M with pmhx of chronic diastolic heart failure, CAD s/p PCI (x5) with subsequent CABG x3v (~2017 @[facility]), IDDM with neuropathy, FTT, HTN, h/o ETOH abuse, liver fibrosis c/b ascites, pAF, chronic LE lymphedema, chronic pancreatitis, former smoker of 2 PPD x30y (quit 2005) who presents to the ED for evaluation of worsening BLE edema with associated increased BLE pain (L>R), erythema, subjective fevers, chills, diaphoresis x2 days. Patient reports chronic lymphedema but this is worse than normal with associated 10 lb wt gain over the last 2 weeks.  He also reports frequent falls and may have hit right leg on ground. Patient denies orthopnea, abd pain, NVD, CP, SOB at rest.     Patient also reports CHURCH and bedbound status over the last 4-5 months with no improvement with HHC.  He is requesting to be further evaluated for SNF for NH as sister and wife can no longer care for him.     Seen by PCP Dr. Barajas on 6/21 requesting admission for progressive weakness, poor endurance, unable to manage medications and difficulty with ADLs and dx with FTT. Chronic LE lymphedema noted to be stable.   Patient underwent US guided paracentesis this afternoon and had 1.2 L removed.     ED: Afebrile, BP stable (SBP 90s), no leukocytosis.  H/H stable.  Na 135/K3.3/Cr2.3 (BL).   (BL <100)/trop .006. BCx taken and given vancomycin for concern of cellulitis. CXR without acute changes. CTH negative for intracranial abnormality. XR R tib/fib without evidence of fracture.  US BLE without evidence of DVT.

## 2019-06-26 NOTE — ASSESSMENT & PLAN NOTE
Family have been trying for several week to obtain SNF placement for patient as patient's condition has declined and has multiple comorbidities   - will need assistance from SW/CM for placement  - PT/OT consulted

## 2019-06-26 NOTE — TELEPHONE ENCOUNTER
Returned Nena's call. Advised her that I would advise Dr. Rowe that her brother was in the hospital at [facility]. Advised that she can have the MD call Dr. Rowe at anytime if recommendations are needed. Nena has no further questions at this time.

## 2019-06-26 NOTE — ASSESSMENT & PLAN NOTE
2/2 liver dz, which began after episode of acute pancreatitis and has progressed  - receives BI-weekly paracentesis with IR  - c/w diuretics

## 2019-06-26 NOTE — ASSESSMENT & PLAN NOTE
Chronic pancreatitis  Other ascites  Stage 1 hepatic fibrosis (CT with evidence of cirrhosis, but biopsy negative)  - f/w general surgery/neuroendocrine and receives twice weekly paracenteses with IR  - ascites c/b poor response to diuretics and noncompliance to salt/fluid restriction  - c/w diuretics  - plts and INR WNL

## 2019-06-26 NOTE — TELEPHONE ENCOUNTER
----- Message from Erlin Alva sent at 6/26/2019 10:51 AM CDT -----  Contact: [phone]/sister Nena  KEYLA---Patient sister calling to speak with you concerning the patient being hospitalized and his health condition.   Please call back to assist at [phone]

## 2019-06-26 NOTE — ASSESSMENT & PLAN NOTE
Lab Results   Component Value Date    HGBA1C 7.1 (H) 05/23/2019   chronic, well controlled  - home regimen: detemir 12U daily, aspart 4 U TIDWM  - inpatient regimen: detemir 6 U daily, low dose SSI, ACHS acchuchecks  - Diabetic diet 2000 calories

## 2019-06-26 NOTE — PLAN OF CARE
(SW) spoke with Pt's , Gini [phone]), who reported that Pt requested transfer to [facility]. Pt's physician, Dr. Castillo, to discuss plan with Pt and Pt's family.      SW will continue to follow and offer support as needed.     06/26/19 1239   Post-Acute Status   Post-Acute Authorization Other   Other Status See Comments  (Possible transfer to [facility])     Chelsea Fonseca, [facility]  [phone]

## 2019-06-26 NOTE — CARE UPDATE
Rapid Response Respiratory Therapy Proactive Rounding Note      Time of visit: 0900    Code Status: Prior   : 1955  Age: 63 y.o.  Weight:   Wt Readings from Last 1 Encounters:   19 124 kg (273 lb 5.9 oz)     Sex: male  Race: White   Bed: OBS /OBS A:   MRN: [id number]    SITUATION     Evaluated patient for: CPAP use    BACKGROUND     Patient has a past medical history of Alcohol abuse, Anasarca, Arthropathy associated with neurological disorder, Atherosclerosis, Charcot foot due to diabetes mellitus, Chronic combined systolic and diastolic heart failure, Chronic pancreatitis, Colon polyps, Coronary artery disease due to calcified coronary lesion, Diabetic polyneuropathy associated with type 2 diabetes mellitus, Diverticulosis, DM type 2 with diabetic peripheral neuropathy, Essential hypertension, Former smoker, Healed ulcer of left foot on examination, Hydrocele, Hypoalbuminemia, Lymphedema of both lower extremities, Mixed hyperlipidemia, Morbid obesity with BMI of 50.0-59.9, adult, Onychomycosis of multiple toenails with type 2 diabetes mellitus and peripheral neuropathy, Perianal cyst, Pseudocyst of pancreas, Skin cancer, Sleep apnea, Status post bariatric surgery, and Type 2 diabetes mellitus, with long-term current use of insulin.  Pulmonary Hx: sleep apnea      ASSESSMENT     Pulse: 77Respiratory rate: 16  Temperature: Temp: 96.2 °F (35.7 °C) BP: BP: 100/60 SpO2:SpO2: 99 %   Level of Consciousness: Level of Consciousness (AVPU): alert  Respiratory Effort: unlabored  Expansion/Accessory Muscle Usage: Expansion/Accessory Muscles/Retractions: no retractions, no use of accessory muscles  All Lung Field Breath Sounds:  clear diminished  Cough Type:  none  Mobility at time of assessment: General Mobility: moderately impaired  O2 Device/Concentration: O2 Device (Oxygen Therapy): room air        Most recent blood gas: No results for input(s): PH, PCO2, PO2, HCO3, POCSATURATED, BE in the last 72  hours.    Current Respiratory Care Orders: CPAP HS, Duoneb nebulized treatments Q4 PRN  NIPPV:CPAP per home settings       Ambu at bedside:  yes    INTERVENTIONS/RECOMMENDATIONS   ?   Continue to monitor.  Dr Castillo notified patient refusing CPAP.        FOLLOW-UP     Please call back the Rapid Response RT, Xander Jordan, RRT at x [phone] for any questions or concerns.

## 2019-06-26 NOTE — CONSULTS
Consult received on patient's RLE. Partial thickness skin loss noted to distal RLE approx 0.5cm in diameter, draining moderate to large amount of serous fluid with surrounding erythema, cellulitic in appearance. RLE with pitting edema. US of RLE ruled out DVT. Arterial vascular study pending for cold extremities. No ABIs noted or ordered at this time. Pending arterial vascular study, patient would need ABIs prior to considering compression therapy.  Recommendations:  MWF- dress wound to right lower extremity hydrofera blue ready foam, wrap from toe to below knee with kerlix then 4 inch ace wrap.   Leg elevation  Pending arterial study- recommend ABIs    Discussed with Dr. Castillo, pending arterial results- ABIs to be ordered.  Nursing to continue care. Wound care to follow prn.    RLE

## 2019-06-26 NOTE — PLAN OF CARE
06/26/19 1211   Discharge Assessment   Assessment Type Discharge Planning Assessment   Confirmed/corrected address and phone number on facesheet? Yes   Assessment information obtained from? Patient;Caregiver;Medical Record   Expected Length of Stay (days) 4   Communicated expected length of stay with patient/caregiver yes   Prior to hospitilization cognitive status: Alert/Oriented   Prior to hospitalization functional status: Needs Assistance;Assistive Equipment   Current cognitive status: Alert/Oriented   Current Functional Status: Needs Assistance;Partially Dependent;Assistive Equipment   Facility Arrived From: home via ED   Lives With spouse   Able to Return to Prior Arrangements no  (The patient declining per PT for home health in functional status and may require SNF placement)   Is patient able to care for self after discharge? No   Who are your caregiver(s) and their phone number(s)? Nena devine sister [phone]   Patient's perception of discharge disposition skilled nursing facility   Readmission Within the Last 30 Days no previous admission in last 30 days   Patient currently being followed by outpatient case management? No   Patient currently receives any other outside agency services? No   Equipment Currently Used at Home CPAP;glucometer;other (see comments)  (line care supples for saline flushes)   Do you have any problems affording any of your prescribed medications? No   Is the patient taking medications as prescribed? yes   Does the patient have transportation home? Yes   Transportation Anticipated other (see comments);family or friend will provide  (PT has ambulanace transportation to paracentesis biweekly)   Does the patient receive services at the [facility]? No   Discharge Plan A Skilled Nursing Facility   Discharge Plan B Home with family;Home Health   DME Needed Upon Discharge  other (see comments)  (TBD)   Patient/Family in Agreement with Plan yes     Met with patient in OBS 1A with  sister Nena Jackman via speaker phone to discuss discharge planning. The pt was admitted via ED to OBS for worsening R LE edema w/cellulitis. He has a significant h/o CHF, liver fibrosis w/ascites and portal vein obstruction. He is followed by Dr. Nilesh Rowe [phone] at [facility] for Liver/Pancreas neuroendocrine/general surgery. Per progress note he is recommending a possible portal vein SMV stent to be placed. He currently receives biweekly paracentesis and is transported to the hospital via ambulance. [facility], PT/OT and lien care for flushes and weekly dressing changes with [facility]. The patient is followed by Family Practitioner Dr. Jovanny Barajas but wishes to change to a internal medicine PCP instead. Discussed plan of care with the staff MD Dr. Castillo regarding the difficulty in finding placement for the patient that requires biweekly paracentesis. His medical condition is chronic and he will likely not be accepted to a [facility] bed and a [facility] placement and his current medical complexity will be difficult if not impossible. The patient and sister are adamant that the patient have the possible portal malini SMV stent placed by Dr. Rowe. Dr. Castillo will contact Dr. Rowe to discuss transfer to [facility] so he may follow the patient to possibly perform the procedure so placement is possible as  services report a decline in condition and recommend skilled placement. PT consulted pending recs.       [id number] [place] - [address]  [address]  [place] [address]  Phone: [phone] Fax: [phone]    PCP  Leon Barajas DO   [phone] phone  [phone] fax    Payor: [employer] / Plan: [employer] CHOICE / Product Type: Commercial /     Will continue to follow and help with discharge planning or transfer to [facility] throughout admission.

## 2019-06-26 NOTE — TELEPHONE ENCOUNTER
Mr. Arrieta has a tunneled PICC line for albumin infusion when he needs paracentesis. I have requested [facility] to change the dressings and clean PICC line.

## 2019-06-26 NOTE — PROGRESS NOTES
[facility] Hospital Medicine  Progress Note    Patient Name: Jian Arrieta  MRN: [id number]  Patient Class: OP- Observation   Admission Date: 6/25/2019  Length of Stay: 0 days  Attending Physician: SELENA Castillo MD  Primary Care Provider: Leon Barajas [facility] Medicine Team: [facility] MED S KAREN Castillo MD    Subjective:     Principal Problem:Cellulitis of right lower extremity without foot    HPI: Jian Arrieta is a 63 y.o. M with pmhx of chronic diastolic heart failure, CAD s/p PCI (x5) with subsequent CABG x3v (~2017 [facility]), IDDM with neuropathy, FTT, HTN, h/o ETOH abuse, liver fibrosis c/b ascites, pAF, chronic LE lymphedema, chronic pancreatitis, former smoker of 2 PPD x30y (quit 2005) who presents to the ED for evaluation of worsening BLE edema with associated increased BLE pain (L>R), erythema, subjective fevers, chills, diaphoresis x2 days. Patient reports chronic lymphedema but this is worse than normal with associated 10 lb wt gain over the last 2 weeks.  He also reports frequent falls and may have hit right leg on ground. Patient denies orthopnea, abd pain, NVD, CP, SOB at rest.      Patient also reports [facility] and bedbound status over the last 4-5 months with no improvement with HHC.  He is requesting to be further evaluated for SNF for NH as sister and wife can no longer care for him.      Seen by PCP Dr. Barajas on 6/21 requesting admission for progressive weakness, poor endurance, unable to manage medications and difficulty with ADLs and dx with FTT. Chronic LE lymphedema noted to be stable.   Patient underwent US guided paracentesis this afternoon and had 1.2 L removed.      ED: Afebrile, BP stable (SBP 90s), no leukocytosis.  H/H stable.  Na 135/K3.3/Cr2.3 (BL).   (BL <100)/trop .006. BCx taken and given vancomycin for concern of cellulitis. CXR without acute changes. CTH negative for intracranial abnormality. XR R tib/fib without evidence of fracture.   "US BLE without evidence of DVT.     Interval History: Mr. Arrieta states he still feels very weak.  He makes notice of the redness to his right lower leg, being aggravated that it is, "just continuing to leak fluid."  He denies fever and chills.      Review of Systems   Constitutional: Positive for activity change, appetite change, fatigue and unexpected weight change. Negative for chills and fever.   HENT: Negative for congestion.    Respiratory: Positive for shortness of breath. Negative for cough.    Cardiovascular: Positive for leg swelling. Negative for chest pain.   Gastrointestinal: Negative for abdominal pain, constipation, diarrhea, nausea and vomiting.   Musculoskeletal: Positive for back pain.   Skin: Positive for color change, rash and wound.   Neurological: Negative for dizziness and weakness.   Hematological: Bruises/bleeds easily.   Psychiatric/Behavioral: Positive for dysphoric mood. Negative for confusion. The patient is not nervous/anxious.      Objective:     Vital Signs (Most Recent):  Temp: 96.2 °F (35.7 °C) (06/26/19 1256)  Pulse: 73 (06/26/19 1508)  Resp: 18 (06/26/19 1256)  BP: 100/60 (06/26/19 1350)  SpO2: 99 % (06/26/19 1256) Vital Signs (24h Range):  Temp:  [96.2 °F (35.7 °C)-97.5 °F (36.4 °C)] 96.2 °F (35.7 °C)  Pulse:  [65-85] 73  Resp:  [18] 18  SpO2:  [98 %-100 %] 99 %  BP: ()/(51-64) 100/60     Weight: 124 kg (273 lb 5.9 oz)  Body mass index is 42.82 kg/m².    Intake/Output Summary (Last 24 hours) at 6/26/2019 1552  Last data filed at 6/26/2019 1400  Gross per 24 hour   Intake 1598 ml   Output 500 ml   Net 1098 ml      Physical Exam   Constitutional: He is oriented to person, place, and time. He appears well-developed and well-nourished. He appears listless.  Non-toxic appearance. He has a sickly appearance. He does not appear ill. No distress.   HENT:   Head: Normocephalic and atraumatic.   Nose: Nose normal.   Mouth/Throat: Oropharynx is clear and moist. Abnormal dentition. No " oropharyngeal exudate.   Eyes: Pupils are equal, round, and reactive to light. Conjunctivae and EOM are normal. Right eye exhibits no discharge. Left eye exhibits no discharge. No scleral icterus.   Neck: Normal range of motion. Neck supple. No JVD present. No tracheal deviation present. No thyromegaly present.   Cardiovascular: Normal rate, regular rhythm and intact distal pulses. Exam reveals no gallop and no friction rub.   Murmur heard.   Systolic murmur is present with a grade of 2/6.  Pulmonary/Chest: Effort normal. No accessory muscle usage or stridor. No tachypnea and no bradypnea. No respiratory distress. He has no decreased breath sounds. He has no wheezes. He has rales in the right lower field and the left lower field. He exhibits no tenderness.   Abdominal: Soft. Bowel sounds are normal. He exhibits distension. He exhibits no mass. There is no tenderness. There is no rebound and no guarding.   Genitourinary:   Genitourinary Comments: No mathew in place   Musculoskeletal: Normal range of motion. He exhibits no edema or tenderness.   Lymphadenopathy:     He has no cervical adenopathy.   3+ edema to legs   Neurological: He is oriented to person, place, and time. He appears listless. He displays normal reflexes. No cranial nerve deficit. He exhibits normal muscle tone. Coordination normal. GCS eye subscore is 4. GCS verbal subscore is 5. GCS motor subscore is 6.   Skin: Skin is warm and dry. No rash noted. No erythema. No pallor.   Right lower leg with an area of open skin leaking serous fluid. Surrounding erythema circumferentially (see photo).  Multiple old ecchymoses to arms   Psychiatric: He has a normal mood and affect. His speech is normal and behavior is normal. Judgment and thought content normal. Cognition and memory are normal.   Nursing note and vitals reviewed.      Significant Labs:   Recent Results (from the past 24 hour(s))   POCT glucose    Collection Time: 06/25/19  4:22 PM   Result Value Ref  Range    POCT Glucose 43 (LL) 70 - 110 mg/dL   Comprehensive metabolic panel    Collection Time: 06/25/19  4:43 PM   Result Value Ref Range    Sodium 135 (L) 136 - 145 mmol/L    Potassium 3.3 (L) 3.5 - 5.1 mmol/L    Chloride 103 95 - 110 mmol/L    CO2 24 23 - 29 mmol/L    Glucose 62 (L) 70 - 110 mg/dL    BUN, Bld 58 (H) 8 - 23 mg/dL    Creatinine 2.3 (H) 0.5 - 1.4 mg/dL    Calcium 7.3 (L) 8.7 - 10.5 mg/dL    Total Protein 4.9 (L) 6.0 - 8.4 g/dL    Albumin 1.7 (L) 3.5 - 5.2 g/dL    Total Bilirubin 0.3 0.1 - 1.0 mg/dL    Alkaline Phosphatase 125 55 - 135 U/L    AST 31 10 - 40 U/L    ALT 30 10 - 44 U/L    Anion Gap 8 8 - 16 mmol/L    eGFR if African American 33.4 (A) >60 mL/min/1.73 m^2    eGFR if non  28.9 (A) >60 mL/min/1.73 m^2   CBC auto differential    Collection Time: 06/25/19  4:43 PM   Result Value Ref Range    WBC 9.25 3.90 - 12.70 K/uL    RBC 3.06 (L) 4.60 - 6.20 M/uL    Hemoglobin 9.0 (L) 14.0 - 18.0 g/dL    Hematocrit 27.7 (L) 40.0 - 54.0 %    Mean Corpuscular Volume 91 82 - 98 fL    Mean Corpuscular Hemoglobin 29.4 27.0 - 31.0 pg    Mean Corpuscular Hemoglobin Conc 32.5 32.0 - 36.0 g/dL    RDW 14.6 (H) 11.5 - 14.5 %    Platelets 255 150 - 350 K/uL    MPV 9.9 9.2 - 12.9 fL    Immature Granulocytes 0.4 0.0 - 0.5 %    Gran # (ANC) 7.3 1.8 - 7.7 K/uL    Immature Grans (Abs) 0.04 0.00 - 0.04 K/uL    Lymph # 1.4 1.0 - 4.8 K/uL    Mono # 0.5 0.3 - 1.0 K/uL    Eos # 0.0 0.0 - 0.5 K/uL    Baso # 0.01 0.00 - 0.20 K/uL    nRBC 0 0 /100 WBC    Gran% 79.1 (H) 38.0 - 73.0 %    Lymph% 15.2 (L) 18.0 - 48.0 %    Mono% 5.1 4.0 - 15.0 %    Eosinophil% 0.1 0.0 - 8.0 %    Basophil% 0.1 0.0 - 1.9 %    Differential Method Automated    Brain natriuretic peptide    Collection Time: 06/25/19  4:43 PM   Result Value Ref Range     (H) 0 - 99 pg/mL   Troponin I    Collection Time: 06/25/19  4:43 PM   Result Value Ref Range    Troponin I <0.006 0.000 - 0.026 ng/mL   Protime-INR    Collection Time: 06/25/19  4:43  PM   Result Value Ref Range    Prothrombin Time 10.9 9.0 - 12.5 sec    INR 1.1 0.8 - 1.2   Magnesium    Collection Time: 06/25/19  4:43 PM   Result Value Ref Range    Magnesium 2.2 1.6 - 2.6 mg/dL   Blood culture #1 **CANNOT BE ORDERED STAT**    Collection Time: 06/25/19  4:43 PM   Result Value Ref Range    Blood Culture, Routine No Growth to date    Blood culture #2 **CANNOT BE ORDERED STAT**    Collection Time: 06/25/19  4:43 PM   Result Value Ref Range    Blood Culture, Routine No Growth to date    POCT glucose    Collection Time: 06/25/19  5:07 PM   Result Value Ref Range    POCT Glucose 123 (H) 70 - 110 mg/dL   Urinalysis, Reflex to Urine Culture Urine, Clean Catch    Collection Time: 06/25/19  5:34 PM   Result Value Ref Range    Specimen UA Urine, Clean Catch     Color, UA Yellow Yellow, Straw, Janay    Appearance, UA Clear Clear    pH, UA 5.0 5.0 - 8.0    Specific Gravity, UA 1.010 1.005 - 1.030    Protein, UA Negative Negative    Glucose, UA Negative Negative    Ketones, UA Negative Negative    Bilirubin (UA) Negative Negative    Occult Blood UA Negative Negative    Nitrite, UA Negative Negative    Leukocytes, UA Negative Negative   POCT glucose    Collection Time: 06/25/19 10:38 PM   Result Value Ref Range    POCT Glucose 63 (L) 70 - 110 mg/dL   POCT glucose    Collection Time: 06/25/19 11:20 PM   Result Value Ref Range    POCT Glucose 160 (H) 70 - 110 mg/dL   Magnesium    Collection Time: 06/26/19  5:32 AM   Result Value Ref Range    Magnesium 2.1 1.6 - 2.6 mg/dL   Phosphorus    Collection Time: 06/26/19  5:32 AM   Result Value Ref Range    Phosphorus 5.0 (H) 2.7 - 4.5 mg/dL   CBC with Automated Differential    Collection Time: 06/26/19  5:32 AM   Result Value Ref Range    WBC 7.29 3.90 - 12.70 K/uL    RBC 2.83 (L) 4.60 - 6.20 M/uL    Hemoglobin 8.2 (L) 14.0 - 18.0 g/dL    Hematocrit 25.8 (L) 40.0 - 54.0 %    Mean Corpuscular Volume 91 82 - 98 fL    Mean Corpuscular Hemoglobin 29.0 27.0 - 31.0 pg    Mean  Corpuscular Hemoglobin Conc 31.8 (L) 32.0 - 36.0 g/dL    RDW 14.6 (H) 11.5 - 14.5 %    Platelets 222 150 - 350 K/uL    MPV 10.2 9.2 - 12.9 fL    Immature Granulocytes 0.3 0.0 - 0.5 %    Gran # (ANC) 5.7 1.8 - 7.7 K/uL    Immature Grans (Abs) 0.02 0.00 - 0.04 K/uL    Lymph # 1.1 1.0 - 4.8 K/uL    Mono # 0.4 0.3 - 1.0 K/uL    Eos # 0.1 0.0 - 0.5 K/uL    Baso # 0.02 0.00 - 0.20 K/uL    nRBC 0 0 /100 WBC    Gran% 77.6 (H) 38.0 - 73.0 %    Lymph% 14.8 (L) 18.0 - 48.0 %    Mono% 5.9 4.0 - 15.0 %    Eosinophil% 1.1 0.0 - 8.0 %    Basophil% 0.3 0.0 - 1.9 %    Differential Method Automated    Comprehensive Metabolic Panel (CMP)    Collection Time: 06/26/19  5:32 AM   Result Value Ref Range    Sodium 134 (L) 136 - 145 mmol/L    Potassium 3.8 3.5 - 5.1 mmol/L    Chloride 104 95 - 110 mmol/L    CO2 25 23 - 29 mmol/L    Glucose 125 (H) 70 - 110 mg/dL    BUN, Bld 55 (H) 8 - 23 mg/dL    Creatinine 2.0 (H) 0.5 - 1.4 mg/dL    Calcium 7.3 (L) 8.7 - 10.5 mg/dL    Total Protein 4.2 (L) 6.0 - 8.4 g/dL    Albumin 1.3 (L) 3.5 - 5.2 g/dL    Total Bilirubin 0.2 0.1 - 1.0 mg/dL    Alkaline Phosphatase 113 55 - 135 U/L    AST 24 10 - 40 U/L    ALT 22 10 - 44 U/L    Anion Gap 5 (L) 8 - 16 mmol/L    eGFR if African American 39.6 (A) >60 mL/min/1.73 m^2    eGFR if non  34.3 (A) >60 mL/min/1.73 m^2   POCT glucose    Collection Time: 06/26/19  7:22 AM   Result Value Ref Range    POCT Glucose 132 (H) 70 - 110 mg/dL   POCT glucose    Collection Time: 06/26/19 11:29 AM   Result Value Ref Range    POCT Glucose 133 (H) 70 - 110 mg/dL   Results for FLORIAN HENDERSON (MRN [id number]) as of 6/26/2019 09:40   Ref. Range 5/29/2019 08:46 6/11/2019 09:01 6/25/2019 16:43 6/26/2019 05:32   Creatinine Latest Ref Range: 0.5 - 1.4 mg/dL 2.6 (H) 2.7 (H) 2.3 (H) 2.0 (H)   eGFR if non African American Latest Ref Range: >60 mL/min/1.73 m^2 24.9 (A) 23.8 (A) 28.9 (A) 34.3 (A)   eGFR if African American Latest Ref Range: >60 mL/min/1.73 m^2 28.8 (A) 27.5 (A)  "33.4 (A) 39.6 (A)         Significant Imaging:      TRANSTHORACIC ECHO (TTE) COMPLETE 5/23/19   Conclusion     · Normal left ventricular systolic function. The estimated ejection fraction is 65%  · No wall motion abnormalities.  · Normal LV diastolic function.  · Normal right ventricular systolic function.       Assessment/Plan:      Cellulitis of right lower extremity without foot  -RLE pain, edema, erythema, warmth x2 days with f/c/diaphoresis concerning for cellulitis.   -No evidence of systemic infection  -Given vanc in ED   -BLE US without evidence of DVT  -Wound care consulted for associated weeping lymphedema to RLE  -f/u BCx     Chronic pancreatitis  -pantoprazole EC tablet 40 mg, 40 mg, Oral, Daily    Cardiac ascites  -spironolactone tablet 25 mg, 25 mg, Oral, BID  -furosemide tablet 80 mg, 80 mg, Oral, BID   -Add Albumin 25g iv BID with lasix  -Stage 1 hepatic fibrosis (CT with evidence of cirrhosis, but biopsy negative)  -He follows with general surgery/neuroendocrine and receives twice weekly paracenteses with IR  -Ascites c/b poor response to diuretics and noncompliance to salt/fluid restriction  -The patient has had full evaluation with Dr. Christensen in Hepatology, 5/2019:   -"Ascites: not related to portal hypertension. No cirrhosis or high grade fibrosis on the biopsy. No portal hypertension, measured HVPG was 2 mmHg. ( HVPG > 12 in clinically significant portal hypertension). He has normal platelet count. He has normal liver synthetic function."  -BP running very low in face of low albumin     Discharge planning issues  -Family have been trying for several week to obtain SNF placement for patient as patient's condition has declined and has multiple comorbidities   -PT and OT consults  -Biweekly paracentesis will make this difficult     Chronic combined systolic and diastolic heart failure  -All 3 echo's he has had have had a different EF, from 30% up to 60%   -Recheck in am  -Monitor daily weights, " strict I/Os, oxygen requirements  -Na restricted/fluid restricted diet 1500 ml  -metoprolol succinate (TOPROL-XL) 24 hr tablet 50 mg, 50 mg, Oral, Daily  -spironolactone tablet 25 mg, 25 mg, Oral, BID  -furosemide tablet 80 mg, 80 mg, Oral, BID     Coronary artery disease due to calcified coronary lesion  History of NSTEMI (non-ST elevated myocardial infarction)  Personal history of CABG   -CABG x3v (~2017 at [facility]; LIMA-LAD [patent], SVG-OM [patent]   -SVG-RCA[occluded; L->R collaterals from LAD to PDA]),   -NSTEMI 3/2019 2/2 demand ischemia from afib.    -[facility] (3/30/19) with multivessel dz and patent stents without need for intervention.   -Follow up with Cardiology, Dr Carney  -metoprolol succinate (TOPROL-XL) 24 hr tablet 50 mg, 50 mg, Oral, Daily  -clopidogrel tablet 75 mg, 75 mg, Oral, Daily  -atorvastatin tablet 40 mg, 40 mg, Oral, Daily     Malnutrition of moderate degree  Hypoalbuminemia  -Albumin 1.7 (BL); total protein 4.9  -Consult nutrition  -PO supplements TIDWM     Paroxysmal atrial fibrillation  Long term use of anticoagulation  -CHADSVASC 4  -metoprolol succinate (TOPROL-XL) 24 hr tablet 50 mg, 50 mg, Oral, Daily  -apixaban tablet 5 mg, 5 mg, Oral, BID     Stage 3 chronic kidney disease  -At baseline renal function  -Estimated Creatinine Clearance: 40.7 mL/min (A) (based on SCr of 2.3 mg/dL (H)).      DM type 2 with diabetic peripheral neuropathy  -HgA1c of 7.1    -Home regimen:    -detemir 12U daily, aspart 4 U TIDWM  -Inpatient regimen:    -insulin aspart U-100 pen 0-5 Units, 0-5 Units, Subcutaneous, QID (AC + HS) PRN   -insulin detemir U-100 pen 6 Units, 6 Units, Subcutaneous, Daily  -Diabetic diet 2000 calories     Decreased mobility  -PT/OT consulted     Lymphedema of both lower extremities  -Chronic problem exacerbated by progressive debility and malnutrition  -Leg elevation     Obstructive sleep apnea  -The patient categorically refuses to wear his CPAP  -He asks that I discontinue the  order    Benign prostatic hypertrophy  -tamsulosin 24 hr capsule 0.4 mg, 1 capsule, Oral, Daily    VTE Risk Mitigation (From admission, onward)        Ordered     apixaban tablet 5 mg  2 times daily      06/25/19 1250             W Rene Castillo MD  Department of Hospital Medicine   [facility]

## 2019-06-26 NOTE — ASSESSMENT & PLAN NOTE
Hypoalbuminemia  - Albumin 1.7 (BL); total protein 4.9  - dietary consulted  - PO supplements TIDWM

## 2019-06-26 NOTE — ASSESSMENT & PLAN NOTE
Baseline renal function  Estimated Creatinine Clearance: 40.7 mL/min (A) (based on SCr of 2.3 mg/dL (H)).   - avoid nephrotoxic agents particularly in setting of liver dz  - renally dose medications  - daily cmp

## 2019-06-26 NOTE — ASSESSMENT & PLAN NOTE
Well controlled on toprol  - c/w eliquis BID  - tenuous BP; will monitor for any changes in BB  - CHADSVASC 4  - monitor on tele

## 2019-06-26 NOTE — ED NOTES
poct glucose 63 mg/dl . Pt aaoX3 . Speaking clear and fluently. Denies feeling weakness. Pt given orange juice and turkey sandwhich.

## 2019-06-26 NOTE — PT/OT/SLP EVAL
Physical Therapy Evaluation    Patient Name:  Jian Arrieta   MRN:  [id number]    Recommendations:     Discharge Recommendations:  nursing facility, skilled   Discharge Equipment Recommendations: walker, rolling, wheelchair, manual(possibly w/c, pending progress)   Barriers to discharge: Inaccessible home and Decreased caregiver support    Assessment:     Jian Arrieta is a 63 y.o. male admitted with a medical diagnosis of Cellulitis of right lower extremity without foot.  He presents with the following impairments/functional limitations:  weakness, impaired functional mobilty, impaired balance, decreased lower extremity function, impaired endurance, pain, decreased ROM, edema, impaired cardiopulmonary response to activity, impaired self care skills, gait instability, other (comment)(decreased hearing) Patient requires assist for functional mobility. Transfer from elevated surface w/ RW and min asist; able to take steps w/ RW and min assist to pivot  To chair and for side steps to HOB.     Rehab Prognosis: Good; patient would benefit from acute skilled PT services to address these deficits and reach maximum level of function.    Recent Surgery: * No surgery found *      Plan:     During this hospitalization, patient to be seen 4 x/week to address the identified rehab impairments via gait training, therapeutic activities, therapeutic exercises and progress toward the following goals:    · Plan of Care Expires:  07/26/19    Subjective     Chief Complaint: pain right leg  Patient/Family Comments/goals: get stronger, walk  Pain/Comfort:  · Pain Rating 1: 7/10  · Location - Side 1: Right  · Location - Orientation 1: lower  · Location 1: leg  · Pain Addressed 1: Distraction, Reposition  · Pain Rating Post-Intervention 1: 6/10    Patients cultural, spiritual, Moravian conflicts given the current situation:      Living Environment:  Patient called Nena pickett, and asked PT to talk to  for information. Nena reports  patient has frequent falls, unable to get in and out of bed by himself and is alone much of the time due to wife works and is caregiver to her elderly mother, Sister requests SNF for rehab to improve patient strength and functional mobility.     Patient reports lives w/ wife who works. Reports SSH w/ 4 EULA, which he is unable to negotiate, so he has bedroom in the garage. Has hospital bed, rollator and BSC, uses urinal.   Prior to admission, patients level of function was needs assistance. Reports 5 falls in the last 4 months.  Equipment used at home: hospital bed, rollator, bedside commode.  DME owned (not currently used): none.  Upon discharge, patient will have assistance from uncertain. Wife works..    Objective:     Communicated with nurse prior to session.  Patient found HOB elevated with telemetry  upon PT entry to room.    General Precautions: Standard, fall   Orthopedic Precautions:N/A   Braces: N/A     Exams:  · Cognitive Exam:  Patient is oriented to Person, Place, Time and Situation  · Gross Motor Coordination:  WFL  · Skin Integrity/Edema:      · -       Skin integrity: B lower LEs w/ edemal, RLE worse w/ redness and 4+ pitting. BLEs weeping w/ bed wet.   · -       Edema: Pitting 4+ lower RLE; LLE also w/ marked edema  · RUE ROM: WFL  · RUE Strength: WFL  · LUE ROM: WFL  · LUE Strength: WFL  · RLE ROM: HF sufficient for sitting, KE WFL, KF limited by edema, grossly WFL, ankle grossly WFL  · RLE Strength: can initiate HF in sitting, grossly 2/5, Knee grossly 3+/5, ankle grossly WFL  · LLE ROM: same as RLE above  · LLE Strength: HF grossly 2/5, Knee 3+/5, ankle grossly WFL    Functional Mobility:  · Bed Mobility:     · Supine to Sit:moderate assistance for BLEs to EOB w/ HOB elevated  · Sit to Supine: moderate assistance for BLEs onto bed  · Transfers:     · Sit to Stand:  minimum assistance with rolling walker with height of bed elevated. Cues for technique. Three attempts for patient to achieve  standing  · Bed to Chair: minimum assistance with  rolling walker  using  Step Transfer. Stands from chair (with cushion from sofa added to increase height) w/ RW and min assist and CGA of second person for safety. Unable first attempt. Min assist for second attempt w/ patient pushing up from chair. Assist at trunk for stability and for anterior weight shift  · Gait: w/ RW and min assist to pivot to chair; stands from chair and pivots and then takes side steps to HOB. Sufficient foot clearance. Small steps, assist to manage RW, for stability, patient w/ downward gaze and forward flexion.   · Balance: sits EOB w/ SBA once positioned to EOB; stands w/ RW and CGA      Therapeutic Activities and Exercises:   Patient educated in PT POC; gait and transfer technique.  Patient's sister, Nena, spoke on phone w/ PT prior to session  Patient assisted w/ doffing his shirt and donning a gown. PCT change sheets while patient up in chair and assists w/ CGA for transfer from chair.  Patient performs 10x LAQ while sitting in chair    AM-PAC 6 CLICK MOBILITY  Total Score:13     Patient left HOB elevated with call button in reach, bed alarm on, nurse notified and PCT present.    GOALS:   Multidisciplinary Problems     Physical Therapy Goals        Problem: Physical Therapy Goal    Goal Priority Disciplines Outcome Goal Variances Interventions   Physical Therapy Goal     PT, PT/OT      Description:  Goals to be met by: 19     Patient will increase functional independence with mobility by performin. Supine to sit with Contact Guard Assistance  2. Sit to supine with MInimal Assistance  3. Sit to stand transfer with Stand-by Assistance with RW  4. Bed to chair transfer with Stand-by Assistance using Rolling Walker  5. Gait  x 30 feet with Contact Guard Assistance using Rolling Walker.   6. Stand for 3 minutes with Stand-by Assistance using Rolling Walker  7. Lower extremity exercise program x20 reps  with assistance as  needed                      History:     Past Medical History:   Diagnosis Date    Alcohol abuse     Anasarca 1/28/2019    Arthropathy associated with neurological disorder 9/2/2015    Atherosclerosis     Charcot foot due to diabetes mellitus     Chronic combined systolic and diastolic heart failure 01/29/2019 1-28-19 Left VentricleModerate decreased ejection fraction at 30%. Normal left ventricular cavity size. Normal wall thickness observed. Grade I (mild) left ventricular diastolic dysfunction consistent with impaired relaxation. Normal left atrial pressure. Moderate, global hypokinesis(see wall scoring diagram). Right VentricleNormal cavity size, wall thickness and ejection fraction. Wall motion n    Chronic pancreatitis 1/28/2019    Colon polyps     approx 5 yrs ago    Coronary artery disease due to calcified coronary lesion 05/08/2015    5 stents on ASA      Diabetic polyneuropathy associated with type 2 diabetes mellitus 9/2/2015    Diverticulosis 1/28/2019    DM type 2 with diabetic peripheral neuropathy 2/4/2019    Essential hypertension 1/28/2019    Former smoker 8/26/2015    Healed ulcer of left foot on examination 6/20/2017    Hydrocele     approx 1.5 yrs ago    Hypoalbuminemia 2/4/2019    Lymphedema of both lower extremities 1/29/2019    Mixed hyperlipidemia 5/8/2015    Morbid obesity with BMI of 50.0-59.9, adult 5/8/2015    Onychomycosis of multiple toenails with type 2 diabetes mellitus and peripheral neuropathy 6/20/2017    Perianal cyst     approx 2 yrs ago    Pseudocyst of pancreas 1/28/2019 1-28-19 Liver has a cirrhotic morphology with no focal lesions.  Significant interval increase in ascites when compared to prior exam which may account for patient's abdominal distension.  Hypodense air-fluid collection along the body of the pancreas which is slightly smaller when compared to prior CT.  Findings may relate to pancreatic necrosis with pancreatic pseudocysts with  infected pseudocyst    Skin cancer     skin cancer    Sleep apnea 8/26/2015    Status post bariatric surgery 1/11/2016    Type 2 diabetes mellitus, with long-term current use of insulin 5/8/2015       Past Surgical History:   Procedure Laterality Date    ANGIOGRAM, CORONARY ARTERY Right 3/20/2019    Performed by Bob [facility]que [facility] at [facility] CATH LAB    ANGIOPLASTY      total x5 stents    Bypass graft study  3/20/2019    Performed by [facility]que [facility] at [facility] CATH LAB    COLONOSCOPY N/A 10/6/2015    Performed by Shekhar Richards MD at [facility] ENDO (2ND FLR)    CORONARY ARTERY BYPASS GRAFT  2017    x3    CYST REMOVAL      GASTRECTOMY      GASTRECTOMY-SLEEVE-LAPAROSCOPIC - 73374 N/A 12/22/2015    Performed by Micheal Villavicencio Jr., MD at [facility] OR 2ND FLR    KNEE ARTHROSCOPY      perianal surgery      perianal cyst removed    pleurx N/A 5/17/2019    Performed by [facility] Provider at [facility] OR 2ND FLR       Time Tracking:     PT Received On: 06/26/19  PT Start Time: 1038     PT Stop Time: 1116  PT Total Time (min): 38 min     Billable Minutes: Evaluation 15 and Therapeutic Activity 23      Aimee Lal, PT  06/26/2019

## 2019-06-27 ENCOUNTER — HOSPITAL ENCOUNTER (INPATIENT)
Facility: HOSPITAL | Age: 64
LOS: 18 days | Discharge: HOME | DRG: 981 | End: 2019-07-15
Attending: SURGERY | Admitting: SURGERY
Payer: COMMERCIAL

## 2019-06-27 ENCOUNTER — TELEPHONE (OUTPATIENT)
Dept: NEUROLOGY | Facility: HOSPITAL | Age: 64
End: 2019-06-27

## 2019-06-27 VITALS
DIASTOLIC BLOOD PRESSURE: 67 MMHG | BODY MASS INDEX: 42.85 KG/M2 | SYSTOLIC BLOOD PRESSURE: 137 MMHG | TEMPERATURE: 97 F | RESPIRATION RATE: 16 BRPM | OXYGEN SATURATION: 92 % | HEART RATE: 100 BPM | WEIGHT: 273 LBS | HEIGHT: 67 IN

## 2019-06-27 DIAGNOSIS — I83.892 VENOUS STASIS ULCER OF LEFT LOWER LEG WITH EDEMA OF LEFT LOWER LEG: ICD-10-CM

## 2019-06-27 DIAGNOSIS — K29.70 GASTRITIS, PRESENCE OF BLEEDING UNSPECIFIED, UNSPECIFIED CHRONICITY, UNSPECIFIED GASTRITIS TYPE: ICD-10-CM

## 2019-06-27 DIAGNOSIS — R60.0 VENOUS STASIS ULCER OF LEFT LOWER LEG WITH EDEMA OF LEFT LOWER LEG: ICD-10-CM

## 2019-06-27 DIAGNOSIS — N18.30 STAGE 3 CHRONIC KIDNEY DISEASE: ICD-10-CM

## 2019-06-27 DIAGNOSIS — N17.9 AKI (ACUTE KIDNEY INJURY): ICD-10-CM

## 2019-06-27 DIAGNOSIS — N17.8 ACUTE RENAL FAILURE WITH SPECIFIED LESION: ICD-10-CM

## 2019-06-27 DIAGNOSIS — E66.2 OBESITY HYPOVENTILATION SYNDROME: ICD-10-CM

## 2019-06-27 DIAGNOSIS — I89.0 LYMPHEDEMA OF BOTH LOWER EXTREMITIES: ICD-10-CM

## 2019-06-27 DIAGNOSIS — I83.029 VENOUS STASIS ULCER OF LEFT LOWER LEG WITH EDEMA OF LEFT LOWER LEG: ICD-10-CM

## 2019-06-27 DIAGNOSIS — R50.9 FEVER, UNSPECIFIED FEVER CAUSE: ICD-10-CM

## 2019-06-27 DIAGNOSIS — R07.9 CHEST PAIN: ICD-10-CM

## 2019-06-27 DIAGNOSIS — R18.8 OTHER ASCITES: Primary | ICD-10-CM

## 2019-06-27 DIAGNOSIS — K76.6 PORTAL HYPERTENSION: ICD-10-CM

## 2019-06-27 DIAGNOSIS — R26.89 DECREASED MOBILITY: ICD-10-CM

## 2019-06-27 DIAGNOSIS — D64.9 ANEMIA, UNSPECIFIED TYPE: ICD-10-CM

## 2019-06-27 DIAGNOSIS — R60.1 ANASARCA: ICD-10-CM

## 2019-06-27 DIAGNOSIS — I89.0 CHRONIC ACQUIRED LYMPHEDEMA: ICD-10-CM

## 2019-06-27 DIAGNOSIS — L97.929 VENOUS STASIS ULCER OF LEFT LOWER LEG WITH EDEMA OF LEFT LOWER LEG: ICD-10-CM

## 2019-06-27 DIAGNOSIS — K86.1 CHRONIC PANCREATITIS, UNSPECIFIED PANCREATITIS TYPE: ICD-10-CM

## 2019-06-27 DIAGNOSIS — I25.84 CORONARY ARTERY DISEASE DUE TO CALCIFIED CORONARY LESION: ICD-10-CM

## 2019-06-27 DIAGNOSIS — I25.10 CORONARY ARTERY DISEASE DUE TO CALCIFIED CORONARY LESION: ICD-10-CM

## 2019-06-27 DIAGNOSIS — I95.9 HYPOTENSION: ICD-10-CM

## 2019-06-27 DIAGNOSIS — I87.2 VENOUS STASIS DERMATITIS OF BOTH LOWER EXTREMITIES: ICD-10-CM

## 2019-06-27 LAB
ALBUMIN SERPL BCP-MCNC: 1.7 G/DL (ref 3.5–5.2)
ALP SERPL-CCNC: 114 U/L (ref 55–135)
ALT SERPL W/O P-5'-P-CCNC: 26 U/L (ref 10–44)
ANION GAP SERPL CALC-SCNC: 5 MMOL/L (ref 8–16)
ANION GAP SERPL CALC-SCNC: 6 MMOL/L (ref 8–16)
ASCENDING AORTA: 3.76 CM
AST SERPL-CCNC: 27 U/L (ref 10–40)
AV INDEX (PROSTH): 0.86
AV MEAN GRADIENT: 2 MMHG
AV PEAK GRADIENT: 3 MMHG
AV VALVE AREA: 2.64 CM2
AV VELOCITY RATIO: 0.63
BASOPHILS # BLD AUTO: 0.01 K/UL (ref 0–0.2)
BASOPHILS # BLD AUTO: 0.03 K/UL (ref 0–0.2)
BASOPHILS NFR BLD: 0.3 % (ref 0–1.9)
BASOPHILS NFR BLD: 0.5 % (ref 0–1.9)
BILIRUB SERPL-MCNC: 0.3 MG/DL (ref 0.1–1)
BSA FOR ECHO PROCEDURE: 2.41 M2
BUN SERPL-MCNC: 40 MG/DL (ref 8–23)
BUN SERPL-MCNC: 45 MG/DL (ref 8–23)
CALCIUM SERPL-MCNC: 7.4 MG/DL (ref 8.7–10.5)
CALCIUM SERPL-MCNC: 7.6 MG/DL (ref 8.7–10.5)
CHLORIDE SERPL-SCNC: 104 MMOL/L (ref 95–110)
CHLORIDE SERPL-SCNC: 106 MMOL/L (ref 95–110)
CO2 SERPL-SCNC: 26 MMOL/L (ref 23–29)
CO2 SERPL-SCNC: 26 MMOL/L (ref 23–29)
CREAT SERPL-MCNC: 1.7 MG/DL (ref 0.5–1.4)
CREAT SERPL-MCNC: 1.7 MG/DL (ref 0.5–1.4)
CV ECHO LV RWT: 0.38 CM
DIFFERENTIAL METHOD: ABNORMAL
DIFFERENTIAL METHOD: ABNORMAL
DOP CALC AO PEAK VEL: 0.92 M/S
DOP CALC AO VTI: 15.92 CM
DOP CALC LVOT AREA: 3.1 CM2
DOP CALC LVOT DIAMETER: 1.98 CM
DOP CALC LVOT PEAK VEL: 0.58 M/S
DOP CALC LVOT STROKE VOLUME: 42.04 CM3
DOP CALCLVOT PEAK VEL VTI: 13.66 CM
ECHO LV POSTERIOR WALL: 0.9 CM (ref 0.6–1.1)
EOSINOPHIL # BLD AUTO: 0.1 K/UL (ref 0–0.5)
EOSINOPHIL # BLD AUTO: 0.2 K/UL (ref 0–0.5)
EOSINOPHIL NFR BLD: 1.6 % (ref 0–8)
EOSINOPHIL NFR BLD: 3.9 % (ref 0–8)
ERYTHROCYTE [DISTWIDTH] IN BLOOD BY AUTOMATED COUNT: 14.5 % (ref 11.5–14.5)
ERYTHROCYTE [DISTWIDTH] IN BLOOD BY AUTOMATED COUNT: 14.8 % (ref 11.5–14.5)
EST. GFR  (AFRICAN AMERICAN): 48 ML/MIN/1.73 M^2
EST. GFR  (AFRICAN AMERICAN): 48.2 ML/MIN/1.73 M^2
EST. GFR  (NON AFRICAN AMERICAN): 41.7 ML/MIN/1.73 M^2
EST. GFR  (NON AFRICAN AMERICAN): 42 ML/MIN/1.73 M^2
FRACTIONAL SHORTENING: 12 % (ref 28–44)
GLUCOSE SERPL-MCNC: 183 MG/DL (ref 70–110)
GLUCOSE SERPL-MCNC: 56 MG/DL (ref 70–110)
HCT VFR BLD AUTO: 23.5 % (ref 40–54)
HCT VFR BLD AUTO: 25.4 % (ref 40–54)
HGB BLD-MCNC: 7.6 G/DL (ref 14–18)
HGB BLD-MCNC: 8.2 G/DL (ref 14–18)
IMM GRANULOCYTES # BLD AUTO: 0.02 K/UL (ref 0–0.04)
IMM GRANULOCYTES NFR BLD AUTO: 0.4 % (ref 0–0.5)
INTERVENTRICULAR SEPTUM: 0.75 CM (ref 0.6–1.1)
LA MAJOR: 4.71 CM
LA MINOR: 4.92 CM
LA WIDTH: 2.26 CM
LEFT ANT TIBIAL SYS PSV: 56 CM/S
LEFT ATRIUM SIZE: 3.94 CM
LEFT ATRIUM VOLUME INDEX: 15.8 ML/M2
LEFT ATRIUM VOLUME: 36.43 CM3
LEFT CFA PSV: 82 CM/S
LEFT EXTERNAL ILIAC PSV: 88 CM/S
LEFT INTERNAL DIMENSION IN SYSTOLE: 4.17 CM (ref 2.1–4)
LEFT POPLITEAL PSV: 42 CM/S
LEFT POST TIBIAL SYS PSV: 53 CM/S
LEFT PROFUNDA SYS PSV: 60 CM/S
LEFT SUPER FEMORAL DIST SYS PSV: 50 CM/S
LEFT SUPER FEMORAL MID SYS PSV: 70 CM/S
LEFT SUPER FEMORAL OSTIAL SYS PSV: 61 CM/S
LEFT SUPER FEMORAL PROX SYS PSV: 61 CM/S
LEFT TIB/PER TRUNK SYS PSV: 71 CM/S
LEFT VENTRICLE DIASTOLIC VOLUME INDEX: 45.66 ML/M2
LEFT VENTRICLE DIASTOLIC VOLUME: 105.39 ML
LEFT VENTRICLE MASS INDEX: 56 G/M2
LEFT VENTRICLE SYSTOLIC VOLUME INDEX: 33.4 ML/M2
LEFT VENTRICLE SYSTOLIC VOLUME: 77.15 ML
LEFT VENTRICULAR INTERNAL DIMENSION IN DIASTOLE: 4.76 CM (ref 3.5–6)
LEFT VENTRICULAR MASS: 130 G
LYMPHOCYTES # BLD AUTO: 0.8 K/UL (ref 1–4.8)
LYMPHOCYTES # BLD AUTO: 1.6 K/UL (ref 1–4.8)
LYMPHOCYTES NFR BLD: 19.8 % (ref 18–48)
LYMPHOCYTES NFR BLD: 28.3 % (ref 18–48)
MAGNESIUM SERPL-MCNC: 1.9 MG/DL (ref 1.6–2.6)
MCH RBC QN AUTO: 28.5 PG (ref 27–31)
MCH RBC QN AUTO: 29.2 PG (ref 27–31)
MCHC RBC AUTO-ENTMCNC: 32.3 G/DL (ref 32–36)
MCHC RBC AUTO-ENTMCNC: 32.3 G/DL (ref 32–36)
MCV RBC AUTO: 88 FL (ref 82–98)
MCV RBC AUTO: 90 FL (ref 82–98)
MONOCYTES # BLD AUTO: 0.2 K/UL (ref 0.3–1)
MONOCYTES # BLD AUTO: 0.4 K/UL (ref 0.3–1)
MONOCYTES NFR BLD: 5.7 % (ref 4–15)
MONOCYTES NFR BLD: 6.6 % (ref 4–15)
NEUTROPHILS # BLD AUTO: 2.8 K/UL (ref 1.8–7.7)
NEUTROPHILS # BLD AUTO: 3.4 K/UL (ref 1.8–7.7)
NEUTROPHILS NFR BLD: 60.3 % (ref 38–73)
NEUTROPHILS NFR BLD: 72.6 % (ref 38–73)
NRBC BLD-RTO: 0 /100 WBC
PHOSPHATE SERPL-MCNC: 3.7 MG/DL (ref 2.7–4.5)
PLATELET # BLD AUTO: 229 K/UL (ref 150–350)
PLATELET # BLD AUTO: 231 K/UL (ref 150–350)
PMV BLD AUTO: 9.1 FL (ref 9.2–12.9)
PMV BLD AUTO: 9.6 FL (ref 9.2–12.9)
POCT GLUCOSE: 111 MG/DL (ref 70–110)
POCT GLUCOSE: 181 MG/DL (ref 70–110)
POCT GLUCOSE: 228 MG/DL (ref 70–110)
POTASSIUM SERPL-SCNC: 3.5 MMOL/L (ref 3.5–5.1)
POTASSIUM SERPL-SCNC: 4 MMOL/L (ref 3.5–5.1)
PROT SERPL-MCNC: 4.3 G/DL (ref 6–8.4)
RA MAJOR: 3.27 CM
RA PRESSURE: 3 MMHG
RA WIDTH: 2.59 CM
RBC # BLD AUTO: 2.67 M/UL (ref 4.6–6.2)
RBC # BLD AUTO: 2.81 M/UL (ref 4.6–6.2)
RIGHT CFA PSV: 122 CM/S
RIGHT EXTERNAL ILLIAC PSV: 100 CM/S
RIGHT POPLITEAL PSV: 55 CM/S
RIGHT POST TIBIAL SYS PSV: 74 CM/S
RIGHT PROFUNDA SYS PSV: 81 CM/S
RIGHT SUPER FEMORAL DIST SYS PSV: 94 CM/S
RIGHT SUPER FEMORAL MID SYS PSV: 126 CM/S
RIGHT SUPER FEMORAL OSTIAL SYS PSV: 125 CM/S
RIGHT SUPER FEMORAL PROX SYS PSV: 91 CM/S
RIGHT TIB/PER TRUNK SYS PSV: 173 CM/S
RIGHT VENTRICULAR END-DIASTOLIC DIMENSION: 3.13 CM
SINUS: 3.47 CM
SODIUM SERPL-SCNC: 136 MMOL/L (ref 136–145)
SODIUM SERPL-SCNC: 137 MMOL/L (ref 136–145)
STJ: 3.22 CM
TDI LATERAL: 0.14 M/S
TDI SEPTAL: 0.07 M/S
TDI: 0.1 M/S
TRICUSPID ANNULAR PLANE SYSTOLIC EXCURSION: 1.81 CM
TROPONIN I SERPL DL<=0.01 NG/ML-MCNC: 0.01 NG/ML (ref 0–0.03)
WBC # BLD AUTO: 3.83 K/UL (ref 3.9–12.7)
WBC # BLD AUTO: 5.59 K/UL (ref 3.9–12.7)

## 2019-06-27 PROCEDURE — 83735 ASSAY OF MAGNESIUM: CPT

## 2019-06-27 PROCEDURE — 84100 ASSAY OF PHOSPHORUS: CPT

## 2019-06-27 PROCEDURE — 25000003 PHARM REV CODE 250: Performed by: PHYSICIAN ASSISTANT

## 2019-06-27 PROCEDURE — 63600175 PHARM REV CODE 636 W HCPCS: Mod: JG | Performed by: INTERNAL MEDICINE

## 2019-06-27 PROCEDURE — P9047 ALBUMIN (HUMAN), 25%, 50ML: HCPCS | Mod: JG | Performed by: INTERNAL MEDICINE

## 2019-06-27 PROCEDURE — 99233 SBSQ HOSP IP/OBS HIGH 50: CPT | Mod: ,,, | Performed by: INTERNAL MEDICINE

## 2019-06-27 PROCEDURE — S0171 BUMETANIDE 0.5 MG: HCPCS

## 2019-06-27 PROCEDURE — 80053 COMPREHEN METABOLIC PANEL: CPT

## 2019-06-27 PROCEDURE — S0077 INJECTION, CLINDAMYCIN PHOSP: HCPCS | Performed by: PHYSICIAN ASSISTANT

## 2019-06-27 PROCEDURE — 25000003 PHARM REV CODE 250: Performed by: INTERNAL MEDICINE

## 2019-06-27 PROCEDURE — 80048 BASIC METABOLIC PNL TOTAL CA: CPT

## 2019-06-27 PROCEDURE — S0077 INJECTION, CLINDAMYCIN PHOSP: HCPCS

## 2019-06-27 PROCEDURE — 25000003 PHARM REV CODE 250

## 2019-06-27 PROCEDURE — 84484 ASSAY OF TROPONIN QUANT: CPT

## 2019-06-27 PROCEDURE — 27000221 HC OXYGEN, UP TO 24 HOURS

## 2019-06-27 PROCEDURE — 11000001 HC ACUTE MED/SURG PRIVATE ROOM

## 2019-06-27 PROCEDURE — 99900035 HC TECH TIME PER 15 MIN (STAT)

## 2019-06-27 PROCEDURE — 85025 COMPLETE CBC W/AUTO DIFF WBC: CPT | Mod: 91

## 2019-06-27 PROCEDURE — P9047 ALBUMIN (HUMAN), 25%, 50ML: HCPCS | Mod: JG

## 2019-06-27 PROCEDURE — 85025 COMPLETE CBC W/AUTO DIFF WBC: CPT

## 2019-06-27 PROCEDURE — 93005 ELECTROCARDIOGRAM TRACING: CPT

## 2019-06-27 PROCEDURE — 63600175 PHARM REV CODE 636 W HCPCS

## 2019-06-27 PROCEDURE — 97116 GAIT TRAINING THERAPY: CPT

## 2019-06-27 PROCEDURE — 97110 THERAPEUTIC EXERCISES: CPT

## 2019-06-27 PROCEDURE — 63600175 PHARM REV CODE 636 W HCPCS: Performed by: PHYSICIAN ASSISTANT

## 2019-06-27 PROCEDURE — 99233 PR SUBSEQUENT HOSPITAL CARE,LEVL III: ICD-10-PCS | Mod: ,,, | Performed by: INTERNAL MEDICINE

## 2019-06-27 PROCEDURE — 94761 N-INVAS EAR/PLS OXIMETRY MLT: CPT

## 2019-06-27 PROCEDURE — S0028 INJECTION, FAMOTIDINE, 20 MG: HCPCS

## 2019-06-27 RX ORDER — FAMOTIDINE 10 MG/ML
20 INJECTION INTRAVENOUS 2 TIMES DAILY
Status: DISCONTINUED | OUTPATIENT
Start: 2019-06-27 | End: 2019-07-02 | Stop reason: DRUGHIGH

## 2019-06-27 RX ORDER — TRAMADOL HYDROCHLORIDE 50 MG/1
50 TABLET ORAL EVERY 6 HOURS PRN
Status: DISCONTINUED | OUTPATIENT
Start: 2019-06-27 | End: 2019-07-15 | Stop reason: HOSPADM

## 2019-06-27 RX ORDER — CEFAZOLIN SODIUM 2 G/50ML
2 SOLUTION INTRAVENOUS ONCE
Status: DISCONTINUED | OUTPATIENT
Start: 2019-06-27 | End: 2019-06-27

## 2019-06-27 RX ORDER — GLUCAGON 1 MG
1 KIT INJECTION
Status: DISCONTINUED | OUTPATIENT
Start: 2019-06-27 | End: 2019-07-15 | Stop reason: HOSPADM

## 2019-06-27 RX ORDER — RAMELTEON 8 MG/1
8 TABLET ORAL NIGHTLY PRN
Status: DISCONTINUED | OUTPATIENT
Start: 2019-06-27 | End: 2019-07-15 | Stop reason: HOSPADM

## 2019-06-27 RX ORDER — AMOXICILLIN 250 MG
1 CAPSULE ORAL 2 TIMES DAILY PRN
Start: 2019-06-27 | End: 2019-08-26

## 2019-06-27 RX ORDER — IPRATROPIUM BROMIDE AND ALBUTEROL SULFATE 2.5; .5 MG/3ML; MG/3ML
3 SOLUTION RESPIRATORY (INHALATION) EVERY 4 HOURS PRN
Status: DISCONTINUED | OUTPATIENT
Start: 2019-06-27 | End: 2019-07-15 | Stop reason: HOSPADM

## 2019-06-27 RX ORDER — TRAMADOL HYDROCHLORIDE 50 MG/1
100 TABLET ORAL EVERY 6 HOURS PRN
Status: DISCONTINUED | OUTPATIENT
Start: 2019-06-27 | End: 2019-07-15 | Stop reason: HOSPADM

## 2019-06-27 RX ORDER — CLINDAMYCIN PHOSPHATE 600 MG/50ML
600 INJECTION, SOLUTION INTRAVENOUS EVERY 8 HOURS
Status: DISCONTINUED | OUTPATIENT
Start: 2019-06-27 | End: 2019-07-08

## 2019-06-27 RX ORDER — ALBUMIN HUMAN 250 G/1000ML
25 SOLUTION INTRAVENOUS 2 TIMES DAILY
Status: ON HOLD
Start: 2019-06-27 | End: 2019-07-15 | Stop reason: HOSPADM

## 2019-06-27 RX ORDER — SODIUM CHLORIDE 0.9 % (FLUSH) 0.9 %
10 SYRINGE (ML) INJECTION
Status: DISCONTINUED | OUTPATIENT
Start: 2019-06-27 | End: 2019-07-15 | Stop reason: HOSPADM

## 2019-06-27 RX ORDER — HEPARIN SODIUM 5000 [USP'U]/ML
5000 INJECTION, SOLUTION INTRAVENOUS; SUBCUTANEOUS EVERY 8 HOURS
Status: COMPLETED | OUTPATIENT
Start: 2019-06-27 | End: 2019-06-27

## 2019-06-27 RX ORDER — ALBUMIN HUMAN 250 G/1000ML
12.5 SOLUTION INTRAVENOUS ONCE
Status: COMPLETED | OUTPATIENT
Start: 2019-06-27 | End: 2019-06-27

## 2019-06-27 RX ORDER — ONDANSETRON 2 MG/ML
4 INJECTION INTRAMUSCULAR; INTRAVENOUS EVERY 6 HOURS PRN
Status: DISCONTINUED | OUTPATIENT
Start: 2019-06-27 | End: 2019-07-15 | Stop reason: HOSPADM

## 2019-06-27 RX ORDER — AMOXICILLIN 250 MG
1 CAPSULE ORAL 2 TIMES DAILY PRN
Status: DISCONTINUED | OUTPATIENT
Start: 2019-06-27 | End: 2019-07-15 | Stop reason: HOSPADM

## 2019-06-27 RX ORDER — IPRATROPIUM BROMIDE AND ALBUTEROL SULFATE 2.5; .5 MG/3ML; MG/3ML
3 SOLUTION RESPIRATORY (INHALATION) EVERY 4 HOURS PRN
Qty: 1 BOX | Refills: 0
Start: 2019-06-27 | End: 2019-08-26

## 2019-06-27 RX ORDER — MANNITOL 250 MG/ML
12.5 INJECTION, SOLUTION INTRAVENOUS ONCE
Status: COMPLETED | OUTPATIENT
Start: 2019-06-27 | End: 2019-06-27

## 2019-06-27 RX ORDER — POTASSIUM CHLORIDE 20 MEQ/1
20 TABLET, EXTENDED RELEASE ORAL ONCE
Status: COMPLETED | OUTPATIENT
Start: 2019-06-27 | End: 2019-06-27

## 2019-06-27 RX ORDER — ACETAMINOPHEN 325 MG/1
650 TABLET ORAL EVERY 4 HOURS PRN
Refills: 0
Start: 2019-06-27 | End: 2019-08-26

## 2019-06-27 RX ORDER — LIDOCAINE HYDROCHLORIDE 10 MG/ML
1 INJECTION, SOLUTION EPIDURAL; INFILTRATION; INTRACAUDAL; PERINEURAL ONCE
Status: DISCONTINUED | OUTPATIENT
Start: 2019-06-27 | End: 2019-07-15 | Stop reason: HOSPADM

## 2019-06-27 RX ORDER — METOPROLOL SUCCINATE 50 MG/1
50 TABLET, EXTENDED RELEASE ORAL DAILY
Status: DISCONTINUED | OUTPATIENT
Start: 2019-06-28 | End: 2019-07-04

## 2019-06-27 RX ORDER — TAMSULOSIN HYDROCHLORIDE 0.4 MG/1
1 CAPSULE ORAL DAILY
Status: DISCONTINUED | OUTPATIENT
Start: 2019-06-28 | End: 2019-07-15 | Stop reason: HOSPADM

## 2019-06-27 RX ORDER — DOPAMINE HCL IN DEXTROSE 5 % 400MG/.25L
2 INFUSION BOTTLE (ML) INTRAVENOUS CONTINUOUS
Status: DISCONTINUED | OUTPATIENT
Start: 2019-06-27 | End: 2019-07-07

## 2019-06-27 RX ORDER — BUMETANIDE 0.25 MG/ML
1 INJECTION INTRAMUSCULAR; INTRAVENOUS ONCE
Status: DISCONTINUED | OUTPATIENT
Start: 2019-06-28 | End: 2019-06-30

## 2019-06-27 RX ORDER — OXYCODONE HYDROCHLORIDE 5 MG/1
5 TABLET ORAL ONCE AS NEEDED
Status: COMPLETED | OUTPATIENT
Start: 2019-06-27 | End: 2019-06-27

## 2019-06-27 RX ORDER — BUMETANIDE 0.25 MG/ML
1 INJECTION INTRAMUSCULAR; INTRAVENOUS ONCE
Status: COMPLETED | OUTPATIENT
Start: 2019-06-27 | End: 2019-06-27

## 2019-06-27 RX ORDER — SPIRONOLACTONE 25 MG/1
25 TABLET ORAL 2 TIMES DAILY
Status: DISCONTINUED | OUTPATIENT
Start: 2019-06-27 | End: 2019-06-28

## 2019-06-27 RX ORDER — MANNITOL 250 MG/ML
12.5 INJECTION, SOLUTION INTRAVENOUS ONCE
Status: DISCONTINUED | OUTPATIENT
Start: 2019-06-28 | End: 2019-06-30

## 2019-06-27 RX ORDER — CLINDAMYCIN PHOSPHATE 600 MG/50ML
600 INJECTION, SOLUTION INTRAVENOUS EVERY 8 HOURS
Status: ON HOLD
Start: 2019-06-27 | End: 2019-07-15 | Stop reason: HOSPADM

## 2019-06-27 RX ORDER — INSULIN ASPART 100 [IU]/ML
0-5 INJECTION, SOLUTION INTRAVENOUS; SUBCUTANEOUS EVERY 6 HOURS PRN
Status: DISCONTINUED | OUTPATIENT
Start: 2019-06-27 | End: 2019-07-15 | Stop reason: HOSPADM

## 2019-06-27 RX ORDER — FUROSEMIDE 40 MG/1
80 TABLET ORAL 2 TIMES DAILY
Status: DISCONTINUED | OUTPATIENT
Start: 2019-06-27 | End: 2019-06-30

## 2019-06-27 RX ORDER — CEFAZOLIN SODIUM 2 G/50ML
2 SOLUTION INTRAVENOUS
Status: DISCONTINUED | OUTPATIENT
Start: 2019-06-28 | End: 2019-06-30

## 2019-06-27 RX ORDER — ACETAMINOPHEN 325 MG/1
650 TABLET ORAL EVERY 4 HOURS PRN
Status: DISCONTINUED | OUTPATIENT
Start: 2019-06-27 | End: 2019-07-15 | Stop reason: HOSPADM

## 2019-06-27 RX ORDER — ATORVASTATIN CALCIUM 40 MG/1
40 TABLET, FILM COATED ORAL DAILY
Status: DISCONTINUED | OUTPATIENT
Start: 2019-06-28 | End: 2019-07-15 | Stop reason: HOSPADM

## 2019-06-27 RX ADMIN — FUROSEMIDE 80 MG: 40 TABLET ORAL at 09:06

## 2019-06-27 RX ADMIN — HEPARIN SODIUM 5000 UNITS: 5000 INJECTION, SOLUTION INTRAVENOUS; SUBCUTANEOUS at 09:06

## 2019-06-27 RX ADMIN — CLOPIDOGREL 75 MG: 75 TABLET, FILM COATED ORAL at 09:06

## 2019-06-27 RX ADMIN — INSULIN DETEMIR 6 UNITS: 100 INJECTION, SOLUTION SUBCUTANEOUS at 09:06

## 2019-06-27 RX ADMIN — INSULIN ASPART 2 UNITS: 100 INJECTION, SOLUTION INTRAVENOUS; SUBCUTANEOUS at 09:06

## 2019-06-27 RX ADMIN — ATORVASTATIN CALCIUM 40 MG: 20 TABLET, FILM COATED ORAL at 09:06

## 2019-06-27 RX ADMIN — ALBUMIN HUMAN 12.5 G: 0.25 SOLUTION INTRAVENOUS at 08:06

## 2019-06-27 RX ADMIN — SODIUM BICARBONATE: 84 INJECTION, SOLUTION INTRAVENOUS at 08:06

## 2019-06-27 RX ADMIN — APIXABAN 5 MG: 5 TABLET, FILM COATED ORAL at 09:06

## 2019-06-27 RX ADMIN — SPIRONOLACTONE 25 MG: 25 TABLET, FILM COATED ORAL at 09:06

## 2019-06-27 RX ADMIN — POTASSIUM CHLORIDE 20 MEQ: 1500 TABLET, EXTENDED RELEASE ORAL at 12:06

## 2019-06-27 RX ADMIN — CLINDAMYCIN IN 5 PERCENT DEXTROSE 600 MG: 12 INJECTION, SOLUTION INTRAVENOUS at 04:06

## 2019-06-27 RX ADMIN — BUMETANIDE 1 MG: 0.25 INJECTION INTRAMUSCULAR; INTRAVENOUS at 08:06

## 2019-06-27 RX ADMIN — TAMSULOSIN HYDROCHLORIDE 0.4 MG: 0.4 CAPSULE ORAL at 09:06

## 2019-06-27 RX ADMIN — PANTOPRAZOLE SODIUM 40 MG: 40 TABLET, DELAYED RELEASE ORAL at 09:06

## 2019-06-27 RX ADMIN — CLINDAMYCIN IN 5 PERCENT DEXTROSE 600 MG: 12 INJECTION, SOLUTION INTRAVENOUS at 01:06

## 2019-06-27 RX ADMIN — FUROSEMIDE 80 MG: 40 TABLET ORAL at 08:06

## 2019-06-27 RX ADMIN — ALBUMIN HUMAN 25 G: 0.25 SOLUTION INTRAVENOUS at 01:06

## 2019-06-27 RX ADMIN — FAMOTIDINE 20 MG: 10 INJECTION, SOLUTION INTRAVENOUS at 08:06

## 2019-06-27 RX ADMIN — SPIRONOLACTONE 25 MG: 25 TABLET, FILM COATED ORAL at 08:06

## 2019-06-27 RX ADMIN — ACETYLCYSTEINE: 200 INJECTION, SOLUTION INTRAVENOUS at 08:06

## 2019-06-27 RX ADMIN — TRAMADOL HYDROCHLORIDE 50 MG: 50 TABLET, FILM COATED ORAL at 09:06

## 2019-06-27 RX ADMIN — OXYCODONE HYDROCHLORIDE 5 MG: 5 TABLET ORAL at 05:06

## 2019-06-27 RX ADMIN — MANNITOL 12.5 G: 250 INJECTION, SOLUTION INTRAVENOUS at 08:06

## 2019-06-27 RX ADMIN — CLINDAMYCIN IN 5 PERCENT DEXTROSE 600 MG: 12 INJECTION, SOLUTION INTRAVENOUS at 10:06

## 2019-06-27 RX ADMIN — DOPAMINE HYDROCHLORIDE IN DEXTROSE 2 MCG/KG/MIN: 1.6 INJECTION, SOLUTION INTRAVENOUS at 09:06

## 2019-06-27 NOTE — PT/OT/SLP PROGRESS
Physical Therapy Treatment    Patient Name:  Jian Arrieta   MRN:  [id number]    Recommendations:     Discharge Recommendations:  nursing facility, skilled   Discharge Equipment Recommendations: walker, rolling, wheelchair, manual   Barriers to discharge: Inaccessible home and Decreased caregiver support    Assessment:     Jian Arrieta is a 63 y.o. male admitted with a medical diagnosis of Cellulitis of right lower extremity without foot.  He presents with the following impairments/functional limitations:  weakness, impaired endurance, gait instability, impaired functional mobilty, impaired self care skills, impaired balance, pain, decreased lower extremity function, decreased upper extremity function, edema, impaired skin Patient participated well today. He performed bed mobility w/ min to mod assistance and was able to stand w/ RW and CGA and take side steps along side of bed w/ RW and CG/min assist x2 trials. .    Rehab Prognosis: Good; patient would benefit from acute skilled PT services to address these deficits and reach maximum level of function.    Recent Surgery: * No surgery found *      Plan:     During this hospitalization, patient to be seen 4 x/week to address the identified rehab impairments via gait training, therapeutic activities, therapeutic exercises and progress toward the following goals:    · Plan of Care Expires:  07/26/19    Subjective     Chief Complaint: RLE pain  Patient/Family Comments/goals: get well and walk; quit falling  Pain/Comfort:  · Pain Rating 1: 7/10  · Location - Side 1: Right  · Location - Orientation 1: lower  · Location 1: leg  · Pain Addressed 1: Distraction, Reposition  · Pain Rating Post-Intervention 1: 7/10      Objective:     Communicated with nurse prior to session.  Patient found HOB elevated  upon PT entry to room.     General Precautions: Standard, fall   Orthopedic Precautions:N/A   Braces: N/A     Functional Mobility:  · Bed Mobility:     · Supine to Sit:  minimum assistance for trunk elevation; patient able to move legs off bed  · Sit to Supine: moderate assistance  · Transfers:     · Sit to Stand:  contact guard assistance with rolling walker x 3 trials from EOB  · Gait: w/ rW and CGA to occ min assist for RW mangement side steps along edge of bed x2 trials. approx 10 side steps; then 6 side steps.  · Balance: stands w/ RW and CGA approx one minute  · Sits EOB w/ UE support and performs LE exercises w/o LOB      AM-PAC 6 CLICK MOBILITY  Turning over in bed (including adjusting bedclothes, sheets and blankets)?: 3  Sitting down on and standing up from a chair with arms (e.g., wheelchair, bedside commode, etc.): 3  Moving from lying on back to sitting on the side of the bed?: 3  Moving to and from a bed to a chair (including a wheelchair)?: 3  Need to walk in hospital room?: 2  Climbing 3-5 steps with a railing?: 1  Basic Mobility Total Score: 15       Therapeutic Activities and Exercises:   in supine performs 20x AP, QS; sitting EOB performs 20x LAQ, AP  In supine, dons a gown w/ mod assist; in sitting EOB, assist to don shoes; shoes doffed after returning to bed.   Patient educated on gait, trf, bed mobility, LE exercises  Patient left HOB elevated with call button in reach..    GOALS:   Multidisciplinary Problems     Physical Therapy Goals        Problem: Physical Therapy Goal    Goal Priority Disciplines Outcome Goal Variances Interventions   Physical Therapy Goal     PT, PT/OT      Description:  Goals to be met by: 19     Patient will increase functional independence with mobility by performin. Supine to sit with Contact Guard Assistance  2. Sit to supine with MInimal Assistance  3. Sit to stand transfer with Stand-by Assistance with RW  4. Bed to chair transfer with Stand-by Assistance using Rolling Walker  5. Gait  x 30 feet with Contact Guard Assistance using Rolling Walker.   6. Stand for 3 minutes with Stand-by Assistance using Rolling Walker  7.  Lower extremity exercise program x20 reps  with assistance as needed                      Time Tracking:     PT Received On: 06/27/19  PT Start Time: 1515     PT Stop Time: 1539  PT Total Time (min): 24 min     Billable Minutes: Gait Training 12 and Therapeutic Exercise 12    Treatment Type: Treatment  PT/PTA: PT     PTA Visit Number: 0     Aimee Lal, PT  06/27/2019

## 2019-06-27 NOTE — PLAN OF CARE
Problem: Adult Inpatient Plan of Care  Goal: Plan of Care Review  Outcome: Ongoing (interventions implemented as appropriate)  Plan of care reviewed with the patient. Vital signs have been within normal limits, and he has not experienced a change in level of conciousness. He has been educated on his risk for falls. At the time of this writing, no fall with injury has occurred. Bed down in lowest position, call light within reach, side rails up x2. Bed alarm on, room near nurse's desk.

## 2019-06-27 NOTE — PLAN OF CARE
(SW) met with Pt at bedside to discuss discharge plan. Pt was alert and oriented. Pt called his sister, Nena, and requested that she be included in the coversation. Nena reported that she would like Pt to go to SNF. SW explained barriers to placement: bi-weekly paracentesis and [employer]. Pt and Nena wanted to know why Pt was still in the observation unit. SW told Pt and Nena that she would contact Pt's physcian, Dr. Catsillo, and request additional information.    SW spoke with Dr. Castillo who reported that Pt will be transferring to [facility] tonight and will have a procedure there tomorrow morning.    SW informed Pt and Nena of above. Nena and Pt requested additional information about the procedure and SW informed them that she is not medical and should direct all calls regarding [facility] hospital stay to the physician that will be working his case there. Pt and Nena expressed understanding.     06/27/19 [id number]   Post-Acute Status   Post-Acute Authorization Other   Other Status No Post-Acute Service Needs   Chelsea Fonseca, [facility]  -[phone]

## 2019-06-27 NOTE — TELEPHONE ENCOUNTER
----- Message from Loyda LACYSedrick Dinorah sent at 6/27/2019  2:09 PM CDT -----  Contact: [phone] Dr. Anna JONES/ [facility] Dr. Blalock is requesting to speak with re: pt's surgery. Please advise

## 2019-06-27 NOTE — NURSING
Pt stated, too soon for another paracentesis; spoke with Dr Castillo, states, to be done tomorrow.

## 2019-06-27 NOTE — PROGRESS NOTES
[facility] Hospital Medicine  Progress Note    Patient Name: Jian Arrieta  MRN: [id number]  Patient Class: OP- Observation   Admission Date: 6/25/2019  Length of Stay: 0 days  Attending Physician: SELENA Castillo MD  Primary Care Provider: Leon Barajas [facility] Medicine Team: [facility]OSP MED S KAREN Castillo MD    Subjective:     Principal Problem:Cellulitis of right lower extremity without foot    HPI: Jian Arrieta is a 63 y.o. M with pmhx of chronic diastolic heart failure, CAD s/p PCI (x5) with subsequent CABG x3v (~2017 @[facility]), IDDM with neuropathy, FTT, HTN, h/o ETOH abuse, liver fibrosis c/b ascites, pAF, chronic LE lymphedema, chronic pancreatitis, former smoker of 2 PPD x30y (quit 2005) who presents to the ED for evaluation of worsening BLE edema with associated increased BLE pain (L>R), erythema, subjective fevers, chills, diaphoresis x2 days. Patient reports chronic lymphedema but this is worse than normal with associated 10 lb wt gain over the last 2 weeks.  He also reports frequent falls and may have hit right leg on ground. Patient denies orthopnea, abd pain, NVD, CP, SOB at rest.      Patient also reports CHURCH and bedbound status over the last 4-5 months with no improvement with HHC.  He is requesting to be further evaluated for SNF for NH as sister and wife can no longer care for him.      Seen by PCP Dr. Barajas on 6/21 requesting admission for progressive weakness, poor endurance, unable to manage medications and difficulty with ADLs and dx with FTT. Chronic LE lymphedema noted to be stable.   Patient underwent US guided paracentesis this afternoon and had 1.2 L removed.      ED: Afebrile, BP stable (SBP 90s), no leukocytosis.  H/H stable.  Na 135/K3.3/Cr2.3 (BL).   (BL <100)/trop .006. BCx taken and given vancomycin for concern of cellulitis. CXR without acute changes. CTH negative for intracranial abnormality. XR R tib/fib without evidence of fracture.   US BLE without evidence of DVT.     Interval History: Mr. Arrieta says he is gradually improving.  He is less short of breath and feels his edema is improving.  He refuses his albumin at times.  His BP does run low.  He has no chest pain.      Review of Systems   Constitutional: Positive for activity change, appetite change, fatigue and unexpected weight change. Negative for chills and fever.   HENT: Negative for congestion.    Respiratory: Positive for shortness of breath. Negative for cough.    Cardiovascular: Positive for leg swelling. Negative for chest pain.   Gastrointestinal: Negative for abdominal pain, constipation, diarrhea, nausea and vomiting.   Musculoskeletal: Positive for back pain.   Skin: Positive for color change, rash and wound.   Neurological: Negative for dizziness and weakness.   Hematological: Bruises/bleeds easily.   Psychiatric/Behavioral: Positive for dysphoric mood. Negative for confusion. The patient is not nervous/anxious.      Objective:     Vital Signs (Most Recent):  Temp: 97 °F (36.1 °C) (06/27/19 0708)  Pulse: 83 (06/27/19 0708)  Resp: 17 (06/27/19 0708)  BP: (!) 107/58 (06/27/19 0708)  SpO2: 100 % (06/27/19 0708) Vital Signs (24h Range):  Temp:  [96.2 °F (35.7 °C)-97.6 °F (36.4 °C)] 97 °F (36.1 °C)  Pulse:  [73-88] 83  Resp:  [17-18] 17  SpO2:  [99 %-100 %] 100 %  BP: ()/(51-79) 107/58     Weight: 124 kg (273 lb 5.9 oz)  Body mass index is 42.82 kg/m².    Intake/Output Summary (Last 24 hours) at 6/27/2019 0938  Last data filed at 6/27/2019 0800  Gross per 24 hour   Intake 361.3 ml   Output 725 ml   Net -363.7 ml      Physical Exam   Constitutional: He is oriented to person, place, and time. He appears well-developed and well-nourished. He appears listless.  Non-toxic appearance. He has a sickly appearance. He does not appear ill. No distress.   HENT:   Head: Normocephalic and atraumatic.   Nose: Nose normal.   Mouth/Throat: Oropharynx is clear and moist. Abnormal dentition.  No oropharyngeal exudate.   Eyes: Pupils are equal, round, and reactive to light. Conjunctivae and EOM are normal. Right eye exhibits no discharge. Left eye exhibits no discharge. No scleral icterus.   Neck: Normal range of motion. Neck supple. No JVD present. No tracheal deviation present. No thyromegaly present.   Cardiovascular: Normal rate, regular rhythm and intact distal pulses. Exam reveals no gallop and no friction rub.   Murmur heard.   Systolic murmur is present with a grade of 2/6.  Pulmonary/Chest: Effort normal. No accessory muscle usage or stridor. No tachypnea and no bradypnea. No respiratory distress. He has no decreased breath sounds. He has no wheezes. He has rales in the right lower field and the left lower field. He exhibits no tenderness.   Abdominal: Soft. Bowel sounds are normal. He exhibits distension. He exhibits no mass. There is no tenderness. There is no rebound and no guarding.   Genitourinary:   Genitourinary Comments: No mathew in place   Musculoskeletal: Normal range of motion. He exhibits no edema or tenderness.   Lymphadenopathy:     He has no cervical adenopathy.   3+ edema to legs   Neurological: He is oriented to person, place, and time. He appears listless. He displays normal reflexes. No cranial nerve deficit. He exhibits normal muscle tone. Coordination normal. GCS eye subscore is 4. GCS verbal subscore is 5. GCS motor subscore is 6.   Skin: Skin is warm and dry. No rash noted. No erythema. No pallor.   Right lower leg with an area of open skin leaking serous fluid. Surrounding erythema circumferentially (see photo).  Multiple old ecchymoses to arms   Psychiatric: He has a normal mood and affect. His speech is normal and behavior is normal. Judgment and thought content normal. Cognition and memory are normal.   Nursing note and vitals reviewed.      Significant Labs:   Recent Results (from the past 24 hour(s))   POCT glucose    Collection Time: 06/26/19 11:29 AM   Result Value  Ref Range    POCT Glucose 133 (H) 70 - 110 mg/dL   CV Ultrasound doppler arterial legs bilat    Collection Time: 06/26/19  4:40 PM   Result Value Ref Range    Left profunda sys PSV 60 cm/s    Left super femoral prox sys PSV 61 cm/s    Left super femoral mid sys PSV 70 cm/s    Left super femoral dist sys PSV 50 cm/s    Left ant tibial sys PSV 56 cm/s    Left tib/per trunk sys PSV 71 cm/s    Left post tibial sys PSV 53 cm/s    Left popliteal PSV 42 cm/s    Right popliteal PSV 55 cm/s    Left CFA PSV 82 cm/s    Right CFA  cm/s    Right profunda sys PSV 81 cm/s    Right super femoral prox sys PSV 91 cm/s    Right super femoral mid sys  cm/s    Right super femoral dist sys PSV 94 cm/s    Right tib/per trunk sys  cm/s    Right post tibial sys PSV 74 cm/s    Left External Iliac PSV 88 cm/s    Right External Illiac  cm/s    Left super femoral ostial sys PSV 61 cm/s    Right super femoral ostial sys  cm/s   POCT glucose    Collection Time: 06/26/19  6:37 PM   Result Value Ref Range    POCT Glucose 233 (H) 70 - 110 mg/dL   POCT glucose    Collection Time: 06/26/19  9:25 PM   Result Value Ref Range    POCT Glucose 152 (H) 70 - 110 mg/dL   CBC with Automated Differential    Collection Time: 06/27/19  4:57 AM   Result Value Ref Range    WBC 5.59 3.90 - 12.70 K/uL    RBC 2.81 (L) 4.60 - 6.20 M/uL    Hemoglobin 8.2 (L) 14.0 - 18.0 g/dL    Hematocrit 25.4 (L) 40.0 - 54.0 %    Mean Corpuscular Volume 90 82 - 98 fL    Mean Corpuscular Hemoglobin 29.2 27.0 - 31.0 pg    Mean Corpuscular Hemoglobin Conc 32.3 32.0 - 36.0 g/dL    RDW 14.5 11.5 - 14.5 %    Platelets 231 150 - 350 K/uL    MPV 9.6 9.2 - 12.9 fL    Immature Granulocytes 0.4 0.0 - 0.5 %    Gran # (ANC) 3.4 1.8 - 7.7 K/uL    Immature Grans (Abs) 0.02 0.00 - 0.04 K/uL    Lymph # 1.6 1.0 - 4.8 K/uL    Mono # 0.4 0.3 - 1.0 K/uL    Eos # 0.2 0.0 - 0.5 K/uL    Baso # 0.03 0.00 - 0.20 K/uL    nRBC 0 0 /100 WBC    Gran% 60.3 38.0 - 73.0 %    Lymph%  28.3 18.0 - 48.0 %    Mono% 6.6 4.0 - 15.0 %    Eosinophil% 3.9 0.0 - 8.0 %    Basophil% 0.5 0.0 - 1.9 %    Differential Method Automated    Comprehensive Metabolic Panel (CMP)    Collection Time: 06/27/19  4:57 AM   Result Value Ref Range    Sodium 137 136 - 145 mmol/L    Potassium 3.5 3.5 - 5.1 mmol/L    Chloride 106 95 - 110 mmol/L    CO2 26 23 - 29 mmol/L    Glucose 56 (L) 70 - 110 mg/dL    BUN, Bld 45 (H) 8 - 23 mg/dL    Creatinine 1.7 (H) 0.5 - 1.4 mg/dL    Calcium 7.4 (L) 8.7 - 10.5 mg/dL    Total Protein 4.3 (L) 6.0 - 8.4 g/dL    Albumin 1.7 (L) 3.5 - 5.2 g/dL    Total Bilirubin 0.3 0.1 - 1.0 mg/dL    Alkaline Phosphatase 114 55 - 135 U/L    AST 27 10 - 40 U/L    ALT 26 10 - 44 U/L    Anion Gap 5 (L) 8 - 16 mmol/L    eGFR if African American 48.2 (A) >60 mL/min/1.73 m^2    eGFR if non  41.7 (A) >60 mL/min/1.73 m^2   POCT glucose    Collection Time: 06/27/19  7:53 AM   Result Value Ref Range    POCT Glucose 228 (H) 70 - 110 mg/dL     Significant Imaging:      TRANSTHORACIC ECHO (TTE) COMPLETE 5/23/19   Conclusion     · Normal left ventricular systolic function. The estimated ejection fraction is 65%  · No wall motion abnormalities.  · Normal LV diastolic function.  · Normal right ventricular systolic function.       Assessment/Plan:      Cellulitis of right lower extremity without foot  -RLE pain, edema, erythema, warmth x2 days with diaphoresis concerning for cellulitis.   -No evidence of systemic infection  -Given vanc in ED   -BLE US without evidence of DVT  -Wound care consulted for associated weeping lymphedema to RLE  -f/u BCx     Chronic pancreatitis  -pantoprazole EC tablet 40 mg, 40 mg, Oral, Daily    Other ascites  -pancreatic necrosis with pancreatic pseudocysts with infected pseudocyst not excluded.    -Mass effect with narrowing of the portal confluence in the region   -Discussed case with Dr. Rowe, [facility] at [place]   -He feels this is SMV/PV occlusion with portal HTN,  "not cardiac or cirrhosis  -spironolactone tablet 25 mg, 25 mg, Oral, BID  -furosemide tablet 80 mg, 80 mg, Oral, BID   -Added Albumin 25g iv BID with lasix  -Stage 1 hepatic fibrosis (CT with evidence of cirrhosis, but biopsy negative)  -He follows with general surgery/neuroendocrine and receives twice weekly paracenteses with IR  -Ascites c/b poor response to diuretics and noncompliance to salt/fluid restriction  -The patient has had full evaluation with Dr. Christensen in Hepatology, 5/2019:   -"Ascites: not related to portal hypertension. No cirrhosis or high grade fibrosis on the biopsy. No portal hypertension, measured HVPG was 2 mmHg. ( HVPG > 12 in clinically significant portal hypertension). He has normal platelet count. He has normal liver synthetic function."  -BP running very low in face of low albumin  -IR paracentesis in am     Discharge planning issues  -Family have been trying for several week to obtain SNF placement for patient as patient's condition has declined and has multiple comorbidities   -PT and OT consults  -Biweekly paracentesis will make this difficult     Chronic combined systolic and diastolic heart failure  -All 3 echo's he has had have had a different EF, from 30% up to 60%  -Monitor daily weights, strict I/Os, oxygen requirements  -Na restricted/fluid restricted diet 1500 ml  -metoprolol succinate (TOPROL-XL) 24 hr tablet 50 mg, 50 mg, Oral, Daily  -spironolactone tablet 25 mg, 25 mg, Oral, BID  -furosemide tablet 80 mg, 80 mg, Oral, BID     Coronary artery disease due to calcified coronary lesion  History of NSTEMI (non-ST elevated myocardial infarction)  Personal history of CABG   -CABG x3v (~2017 at [facility]; LIMA-LAD [patent], SVG-OM [patent]   -SVG-RCA[occluded; L->R collaterals from LAD to PDA]),   -NSTEMI 3/2019 2/2 demand ischemia from afib.    -[facility] (3/30/19) with multivessel dz and patent stents without need for intervention.   -Follow up with Cardiology, Dr Carney  -metoprolol succinate " (TOPROL-XL) 24 hr tablet 50 mg, 50 mg, Oral, Daily  -clopidogrel tablet 75 mg, 75 mg, Oral, Daily  -atorvastatin tablet 40 mg, 40 mg, Oral, Daily     Malnutrition of moderate degree  Hypoalbuminemia  -Albumin 1.7 (BL); total protein 4.9  -Consult nutrition  -PO supplements TIDWM     Paroxysmal atrial fibrillation  Long term use of anticoagulation  -CHADSVASC 4  -metoprolol succinate (TOPROL-XL) 24 hr tablet 50 mg, 50 mg, Oral, Daily  -apixaban tablet 5 mg, 5 mg, Oral, BID     Stage 3 chronic kidney disease  -At baseline renal function  -Estimated Creatinine Clearance: 40.7 mL/min (A) (based on SCr of 2.3 mg/dL (H)).      DM type 2 with diabetic peripheral neuropathy  -HgA1c of 7.1    -Home regimen:    -detemir 12U daily, aspart 4 U TIDWM  -Inpatient regimen:    -insulin aspart U-100 pen 0-5 Units, 0-5 Units, Subcutaneous, QID (AC + HS) PRN   -insulin detemir U-100 pen 6 Units, 6 Units, Subcutaneous, Daily  -Diabetic diet 2000 calories     Decreased mobility  -PT/OT consulted     Lymphedema of both lower extremities  -Chronic problem exacerbated by progressive debility and malnutrition  -Leg elevation     Obstructive sleep apnea  -The patient categorically refuses to wear his CPAP  -He asks that I discontinue the order    Benign prostatic hypertrophy  -tamsulosin 24 hr capsule 0.4 mg, 1 capsule, Oral, Daily    VTE Risk Mitigation (From admission, onward)        Ordered     apixaban tablet 5 mg  2 times daily      06/25/19 2202             W Rene Castillo MD  Department of Hospital Medicine   [facility]

## 2019-06-27 NOTE — DISCHARGE SUMMARY
[facility] Hospital Medicine  Discharge Summary      Patient Name: Jian Arrieta  MRN: [id number]  Admission Date: 6/25/2019  Hospital Length of Stay: 0 days  Discharge Date and Time:  06/27/2019 4:33 PM  Attending Physician: SELENA Castillo MD   Discharging Provider: KAREN Castillo MD  Primary Care Provider: Leon Barajas DO    [facility] Medicine Team: [facility] KAREN Castillo MD    HPI: Jian Arrieta is a 63 y.o. M with pmhx of chronic diastolic heart failure, CAD s/p PCI (x5) with subsequent CABG x3v (~2017 @[facility]), IDDM with neuropathy, FTT, HTN, h/o ETOH abuse, liver fibrosis c/b ascites, pAF, chronic LE lymphedema, chronic pancreatitis, former smoker of 2 PPD x30y (quit 2005) who presents to the ED for evaluation of worsening BLE edema with associated increased BLE pain (L>R), erythema, subjective fevers, chills, diaphoresis x2 days. Patient reports chronic lymphedema but this is worse than normal with associated 10 lb wt gain over the last 2 weeks.  He also reports frequent falls and may have hit right leg on ground. Patient denies orthopnea, abd pain, NVD, CP, SOB at rest.      Patient also reports CHURCH and bedbound status over the last 4-5 months with no improvement with HHC.  He is requesting to be further evaluated for SNF for NH as sister and wife can no longer care for him.      Seen by PCP Dr. Barajas on 6/21 requesting admission for progressive weakness, poor endurance, unable to manage medications and difficulty with ADLs and dx with FTT. Chronic LE lymphedema noted to be stable.   Patient underwent US guided paracentesis this afternoon and had 1.2 L removed.      ED: Afebrile, BP stable (SBP 90s), no leukocytosis.  H/H stable.  Na 135/K3.3/Cr2.3 (BL).   (BL <100)/trop .006. BCx taken and given vancomycin for concern of cellulitis. CXR without acute changes. CTH negative for intracranial abnormality. XR R tib/fib without evidence of fracture.  US BLE without  "evidence of DVT.     * No surgery found *      Hospital Course:   Cellulitis of right lower extremity without foot  -RLE pain, edema, erythema, warmth x2 days with diaphoresis concerning for cellulitis.   -No evidence of systemic infection  -Given vanc in ED   -BLE US without evidence of DVT  -Wound care consulted for associated weeping lymphedema to RLE  -f/u BCx     Chronic pancreatitis  -pantoprazole EC tablet 40 mg, 40 mg, Oral, Daily     Other ascites  -pancreatic necrosis with pancreatic pseudocysts with infected pseudocyst not excluded.    -Mass effect with narrowing of the portal confluence in the region              -Discussed case with Dr. Rowe, Gen Surgery at [facility]              -He feels this is SMV/PV occlusion with portal HTN, not cardiac or cirrhosis  -Transferring to [facility] for procedure tomorrow  -spironolactone tablet 25 mg, 25 mg, Oral, BID  -furosemide tablet 80 mg, 80 mg, Oral, BID              -Added Albumin 25g iv BID with lasix  -Stage 1 hepatic fibrosis (CT with evidence of cirrhosis, but biopsy negative)  -He follows with general surgery/neuroendocrine and receives twice weekly paracenteses with IR  -Ascites c/b poor response to diuretics and noncompliance to salt/fluid restriction  -The patient has had full evaluation with Dr. Christensen in Hepatology, 5/2019:              -"Ascites: not related to portal hypertension. No cirrhosis or high grade fibrosis on the biopsy. No portal hypertension, measured HVPG was 2 mmHg. ( HVPG > 12 in clinically significant portal hypertension). He has normal platelet count. He has normal liver synthetic function."  -BP running very low in face of low albumin  -IR paracentesis due in am     Discharge planning issues  -Family have been trying for several week to obtain SNF placement for patient as patient's condition has declined and has multiple comorbidities   -PT and OT consults  -Biweekly paracentesis will make this difficult     Chronic " combined systolic and diastolic heart failure  -All 3 echo's he has had have had a different EF, from 30% up to 60%  -Monitor daily weights, strict I/Os, oxygen requirements  -Na restricted/fluid restricted diet 1500 ml  -metoprolol succinate (TOPROL-XL) 24 hr tablet 50 mg, 50 mg, Oral, Daily  -spironolactone tablet 25 mg, 25 mg, Oral, BID  -furosemide tablet 80 mg, 80 mg, Oral, BID     Coronary artery disease due to calcified coronary lesion  History of NSTEMI (non-ST elevated myocardial infarction)  Personal history of CABG              -CABG x3v (~2017 at [facility]; LIMA-LAD [patent], SVG-OM [patent]              -SVG-RCA[occluded; L->R collaterals from LAD to PDA]),   -NSTEMI 3/2019 2/2 demand ischemia from afib.    -[facility] (3/30/19) with multivessel dz and patent stents without need for intervention.   -Follow up with Cardiology, Dr Carney  -metoprolol succinate (TOPROL-XL) 24 hr tablet 50 mg, 50 mg, Oral, Daily  -clopidogrel tablet 75 mg, 75 mg, Oral, Daily  -atorvastatin tablet 40 mg, 40 mg, Oral, Daily     Malnutrition of moderate degree  Hypoalbuminemia  -Albumin 1.7 (BL); total protein 4.9  -Consult nutrition  -PO supplements TIDWM     Paroxysmal atrial fibrillation  Long term use of anticoagulation  -CHADSVASC 4  -metoprolol succinate (TOPROL-XL) 24 hr tablet 50 mg, 50 mg, Oral, Daily  -apixaban tablet 5 mg, 5 mg, Oral, BID     Stage 3 chronic kidney disease  -At baseline renal function  -Estimated Creatinine Clearance: 40.7 mL/min (A) (based on SCr of 2.3 mg/dL (H)).      DM type 2 with diabetic peripheral neuropathy  -HgA1c of 7.1    -Home regimen:               -detemir 12U daily, aspart 4 U TIDWM  -Inpatient regimen:               -insulin aspart U-100 pen 0-5 Units, 0-5 Units, Subcutaneous, QID (AC + HS) PRN              -insulin detemir U-100 pen 6 Units, 6 Units, Subcutaneous, Daily  -Diabetic diet 2000 calories     Decreased mobility  -PT/OT consulted     Lymphedema of both lower extremities  -Chronic  problem exacerbated by progressive debility and malnutrition  -Leg elevation     Obstructive sleep apnea  -The patient categorically refuses to wear his CPAP  -He asks that I discontinue the order     Benign prostatic hypertrophy  -tamsulosin 24 hr capsule 0.4 mg, 1 capsule, Oral, Daily    Consults:   Consults (From admission, onward)        Status Ordering Provider     Inpatient consult to Registered Dietitian/Nutritionist  Once     Provider:  (Not yet assigned)    Completed ADITYA BURK consult to case management  Once     Provider:  (Not yet assigned)    Acknowledged SELENA CROOK          Final Active Diagnoses:    Diagnosis Date Noted POA    PRINCIPAL PROBLEM:  Cellulitis of right lower extremity without foot [L03.115] 06/26/2019 Yes    Discharge planning issues [Z02.9] 06/25/2019 Not Applicable    Malnutrition of moderate degree [E44.0] 06/25/2019 Yes    Hepatic fibrosis [K74.0] 06/25/2019 Yes    Decreased mobility [R26.89] 06/11/2019 Yes    CHF (congestive heart failure) [I50.9] 06/05/2019 Yes    Stage 3 chronic kidney disease [N18.3] 05/23/2019 Yes    Alteration in skin integrity [R23.9] 05/23/2019 Yes    Paroxysmal atrial fibrillation [I48.0] 03/16/2019 Yes    Other ascites [R18.8] 03/13/2019 Yes    DM type 2 with diabetic peripheral neuropathy [E11.42] 02/04/2019 Yes    Hypoalbuminemia [E88.09] 02/04/2019 Yes    Chronic diastolic heart failure [I50.32] 01/29/2019 Yes    Lymphedema of both lower extremities [I89.0] 01/29/2019 Yes    Chronic pancreatitis [K86.1] 01/28/2019 Yes    Sleep apnea [G47.30] 08/26/2015 Yes    Coronary artery disease due to calcified coronary lesion [I25.10, I25.84] 05/08/2015 Yes    Morbid obesity with BMI of 50.0-59.9, adult [E66.01, Z68.43] 05/08/2015 Not Applicable      Problems Resolved During this Admission:      Discharged Condition: fair    Disposition: Discharged to Other Facility    Follow Up:    Patient Instructions:   No discharge  procedures on file.  Medications:  Reconciled Home Medications:      Medication List      START taking these medications    acetaminophen 325 MG tablet  Commonly known as:  TYLENOL  Take 2 tablets (650 mg total) by mouth every 4 (four) hours as needed.     albumin human 25% 25 % bottle  Inject 100 mLs (25 g total) into the vein 2 (two) times daily.     albuterol-ipratropium 2.5 mg-0.5 mg/3 mL nebulizer solution  Commonly known as:  DUO-NEB  Take 3 mLs by nebulization every 4 (four) hours as needed for Wheezing. Rescue     clindamycin 600 MG/50 ML D5W 600 mg/50 mL IVPB  Commonly known as:  CLEOCIN  Inject 50 mLs (600 mg total) into the vein every 8 (eight) hours.     senna-docusate 8.6-50 mg 8.6-50 mg per tablet  Commonly known as:  PERICOLACE  Take 1 tablet by mouth 2 (two) times daily as needed for Constipation.        CONTINUE taking these medications    apixaban 5 mg Tab  Commonly known as:  ELIQUIS  Take 1 tablet (5 mg total) by mouth 2 (two) times daily.     atorvastatin 40 MG tablet  Commonly known as:  LIPITOR  Take 1 tablet (40 mg total) by mouth once daily.     clopidogrel 75 mg tablet  Commonly known as:  PLAVIX  Take 1 tablet (75 mg total) by mouth once daily.     furosemide 80 MG tablet  Commonly known as:  LASIX  Take 1 tablet (80 mg total) by mouth 2 (two) times daily.     insulin aspart U-100 100 unit/mL injection  Commonly known as:  NOVOLOG  Inject 4 Units into the skin 3 (three) times daily before meals.     LEVEMIR FLEXPEN SUBQ  Inject 12 Units into the skin once daily.     lipase-protease-amylase 36,000-114,000- 180,000 unit Cpdr  Commonly known as:  CREON  Take 1 capsule by mouth 3 (three) times daily.     metoprolol succinate 50 MG 24 hr tablet  Commonly known as:  TOPROL-XL  Take 1 tablet (50 mg total) by mouth once daily.     omeprazole 40 MG capsule  Commonly known as:  PRILOSEC  Take 40 mg by mouth once daily.     spironolactone 25 MG tablet  Commonly known as:  ALDACTONE  Take 25 mg by  mouth 2 (two) times daily.     tamsulosin 0.4 mg Cap  Commonly known as:  FLOMAX  Take 1 capsule by mouth once daily. Pt has not started taking as of 2/27/19.        STOP taking these medications    ONETOUCH ULTRA TEST Strp  Generic drug:  blood sugar diagnostic        ASK your doctor about these medications    cyanocobalamin (vitamin B-12) 500 mcg Subl  Place 1 tablet under the tongue every Monday.            Significant Diagnostic Studies:    Recent Results (from the past 48 hour(s))   Comprehensive metabolic panel    Collection Time: 06/25/19  4:43 PM   Result Value Ref Range    Sodium 135 (L) 136 - 145 mmol/L    Potassium 3.3 (L) 3.5 - 5.1 mmol/L    Chloride 103 95 - 110 mmol/L    CO2 24 23 - 29 mmol/L    Glucose 62 (L) 70 - 110 mg/dL    BUN, Bld 58 (H) 8 - 23 mg/dL    Creatinine 2.3 (H) 0.5 - 1.4 mg/dL    Calcium 7.3 (L) 8.7 - 10.5 mg/dL    Total Protein 4.9 (L) 6.0 - 8.4 g/dL    Albumin 1.7 (L) 3.5 - 5.2 g/dL    Total Bilirubin 0.3 0.1 - 1.0 mg/dL    Alkaline Phosphatase 125 55 - 135 U/L    AST 31 10 - 40 U/L    ALT 30 10 - 44 U/L    Anion Gap 8 8 - 16 mmol/L    eGFR if African American 33.4 (A) >60 mL/min/1.73 m^2    eGFR if non  28.9 (A) >60 mL/min/1.73 m^2   CBC auto differential    Collection Time: 06/25/19  4:43 PM   Result Value Ref Range    WBC 9.25 3.90 - 12.70 K/uL    RBC 3.06 (L) 4.60 - 6.20 M/uL    Hemoglobin 9.0 (L) 14.0 - 18.0 g/dL    Hematocrit 27.7 (L) 40.0 - 54.0 %    Mean Corpuscular Volume 91 82 - 98 fL    Mean Corpuscular Hemoglobin 29.4 27.0 - 31.0 pg    Mean Corpuscular Hemoglobin Conc 32.5 32.0 - 36.0 g/dL    RDW 14.6 (H) 11.5 - 14.5 %    Platelets 255 150 - 350 K/uL    MPV 9.9 9.2 - 12.9 fL    Immature Granulocytes 0.4 0.0 - 0.5 %    Gran # (ANC) 7.3 1.8 - 7.7 K/uL    Immature Grans (Abs) 0.04 0.00 - 0.04 K/uL    Lymph # 1.4 1.0 - 4.8 K/uL    Mono # 0.5 0.3 - 1.0 K/uL    Eos # 0.0 0.0 - 0.5 K/uL    Baso # 0.01 0.00 - 0.20 K/uL    nRBC 0 0 /100 WBC    Gran% 79.1 (H) 38.0 -  73.0 %    Lymph% 15.2 (L) 18.0 - 48.0 %    Mono% 5.1 4.0 - 15.0 %    Eosinophil% 0.1 0.0 - 8.0 %    Basophil% 0.1 0.0 - 1.9 %    Differential Method Automated    Brain natriuretic peptide    Collection Time: 06/25/19  4:43 PM   Result Value Ref Range     (H) 0 - 99 pg/mL   Troponin I    Collection Time: 06/25/19  4:43 PM   Result Value Ref Range    Troponin I <0.006 0.000 - 0.026 ng/mL   Protime-INR    Collection Time: 06/25/19  4:43 PM   Result Value Ref Range    Prothrombin Time 10.9 9.0 - 12.5 sec    INR 1.1 0.8 - 1.2   Magnesium    Collection Time: 06/25/19  4:43 PM   Result Value Ref Range    Magnesium 2.2 1.6 - 2.6 mg/dL   Blood culture #1 **CANNOT BE ORDERED STAT**    Collection Time: 06/25/19  4:43 PM   Result Value Ref Range    Blood Culture, Routine No Growth to date     Blood Culture, Routine No Growth to date    Blood culture #2 **CANNOT BE ORDERED STAT**    Collection Time: 06/25/19  4:43 PM   Result Value Ref Range    Blood Culture, Routine No Growth to date     Blood Culture, Routine No Growth to date    POCT glucose    Collection Time: 06/25/19  5:07 PM   Result Value Ref Range    POCT Glucose 123 (H) 70 - 110 mg/dL   Urinalysis, Reflex to Urine Culture Urine, Clean Catch    Collection Time: 06/25/19  5:34 PM   Result Value Ref Range    Specimen UA Urine, Clean Catch     Color, UA Yellow Yellow, Straw, Janay    Appearance, UA Clear Clear    pH, UA 5.0 5.0 - 8.0    Specific Gravity, UA 1.010 1.005 - 1.030    Protein, UA Negative Negative    Glucose, UA Negative Negative    Ketones, UA Negative Negative    Bilirubin (UA) Negative Negative    Occult Blood UA Negative Negative    Nitrite, UA Negative Negative    Leukocytes, UA Negative Negative   POCT glucose    Collection Time: 06/25/19 10:38 PM   Result Value Ref Range    POCT Glucose 63 (L) 70 - 110 mg/dL   POCT glucose    Collection Time: 06/25/19 11:20 PM   Result Value Ref Range    POCT Glucose 160 (H) 70 - 110 mg/dL   Magnesium     Collection Time: 06/26/19  5:32 AM   Result Value Ref Range    Magnesium 2.1 1.6 - 2.6 mg/dL   Phosphorus    Collection Time: 06/26/19  5:32 AM   Result Value Ref Range    Phosphorus 5.0 (H) 2.7 - 4.5 mg/dL   CBC with Automated Differential    Collection Time: 06/26/19  5:32 AM   Result Value Ref Range    WBC 7.29 3.90 - 12.70 K/uL    RBC 2.83 (L) 4.60 - 6.20 M/uL    Hemoglobin 8.2 (L) 14.0 - 18.0 g/dL    Hematocrit 25.8 (L) 40.0 - 54.0 %    Mean Corpuscular Volume 91 82 - 98 fL    Mean Corpuscular Hemoglobin 29.0 27.0 - 31.0 pg    Mean Corpuscular Hemoglobin Conc 31.8 (L) 32.0 - 36.0 g/dL    RDW 14.6 (H) 11.5 - 14.5 %    Platelets 222 150 - 350 K/uL    MPV 10.2 9.2 - 12.9 fL    Immature Granulocytes 0.3 0.0 - 0.5 %    Gran # (ANC) 5.7 1.8 - 7.7 K/uL    Immature Grans (Abs) 0.02 0.00 - 0.04 K/uL    Lymph # 1.1 1.0 - 4.8 K/uL    Mono # 0.4 0.3 - 1.0 K/uL    Eos # 0.1 0.0 - 0.5 K/uL    Baso # 0.02 0.00 - 0.20 K/uL    nRBC 0 0 /100 WBC    Gran% 77.6 (H) 38.0 - 73.0 %    Lymph% 14.8 (L) 18.0 - 48.0 %    Mono% 5.9 4.0 - 15.0 %    Eosinophil% 1.1 0.0 - 8.0 %    Basophil% 0.3 0.0 - 1.9 %    Differential Method Automated    Comprehensive Metabolic Panel (CMP)    Collection Time: 06/26/19  5:32 AM   Result Value Ref Range    Sodium 134 (L) 136 - 145 mmol/L    Potassium 3.8 3.5 - 5.1 mmol/L    Chloride 104 95 - 110 mmol/L    CO2 25 23 - 29 mmol/L    Glucose 125 (H) 70 - 110 mg/dL    BUN, Bld 55 (H) 8 - 23 mg/dL    Creatinine 2.0 (H) 0.5 - 1.4 mg/dL    Calcium 7.3 (L) 8.7 - 10.5 mg/dL    Total Protein 4.2 (L) 6.0 - 8.4 g/dL    Albumin 1.3 (L) 3.5 - 5.2 g/dL    Total Bilirubin 0.2 0.1 - 1.0 mg/dL    Alkaline Phosphatase 113 55 - 135 U/L    AST 24 10 - 40 U/L    ALT 22 10 - 44 U/L    Anion Gap 5 (L) 8 - 16 mmol/L    eGFR if African American 39.6 (A) >60 mL/min/1.73 m^2    eGFR if non  34.3 (A) >60 mL/min/1.73 m^2   POCT glucose    Collection Time: 06/26/19  7:22 AM   Result Value Ref Range    POCT Glucose 132 (H)  70 - 110 mg/dL   POCT glucose    Collection Time: 06/26/19 11:29 AM   Result Value Ref Range    POCT Glucose 133 (H) 70 - 110 mg/dL   CV Ultrasound doppler arterial legs bilat    Collection Time: 06/26/19  4:40 PM   Result Value Ref Range    Left profunda sys PSV 60 cm/s    Left super femoral prox sys PSV 61 cm/s    Left super femoral mid sys PSV 70 cm/s    Left super femoral dist sys PSV 50 cm/s    Left ant tibial sys PSV 56 cm/s    Left tib/per trunk sys PSV 71 cm/s    Left post tibial sys PSV 53 cm/s    Left popliteal PSV 42 cm/s    Right popliteal PSV 55 cm/s    Left CFA PSV 82 cm/s    Right CFA  cm/s    Right profunda sys PSV 81 cm/s    Right super femoral prox sys PSV 91 cm/s    Right super femoral mid sys  cm/s    Right super femoral dist sys PSV 94 cm/s    Right tib/per trunk sys  cm/s    Right post tibial sys PSV 74 cm/s    Left External Iliac PSV 88 cm/s    Right External Illiac  cm/s    Left super femoral ostial sys PSV 61 cm/s    Right super femoral ostial sys  cm/s   POCT glucose    Collection Time: 06/26/19  6:37 PM   Result Value Ref Range    POCT Glucose 233 (H) 70 - 110 mg/dL   POCT glucose    Collection Time: 06/26/19  9:25 PM   Result Value Ref Range    POCT Glucose 152 (H) 70 - 110 mg/dL   CBC with Automated Differential    Collection Time: 06/27/19  4:57 AM   Result Value Ref Range    WBC 5.59 3.90 - 12.70 K/uL    RBC 2.81 (L) 4.60 - 6.20 M/uL    Hemoglobin 8.2 (L) 14.0 - 18.0 g/dL    Hematocrit 25.4 (L) 40.0 - 54.0 %    Mean Corpuscular Volume 90 82 - 98 fL    Mean Corpuscular Hemoglobin 29.2 27.0 - 31.0 pg    Mean Corpuscular Hemoglobin Conc 32.3 32.0 - 36.0 g/dL    RDW 14.5 11.5 - 14.5 %    Platelets 231 150 - 350 K/uL    MPV 9.6 9.2 - 12.9 fL    Immature Granulocytes 0.4 0.0 - 0.5 %    Gran # (ANC) 3.4 1.8 - 7.7 K/uL    Immature Grans (Abs) 0.02 0.00 - 0.04 K/uL    Lymph # 1.6 1.0 - 4.8 K/uL    Mono # 0.4 0.3 - 1.0 K/uL    Eos # 0.2 0.0 - 0.5 K/uL    Baso #  0.03 0.00 - 0.20 K/uL    nRBC 0 0 /100 WBC    Gran% 60.3 38.0 - 73.0 %    Lymph% 28.3 18.0 - 48.0 %    Mono% 6.6 4.0 - 15.0 %    Eosinophil% 3.9 0.0 - 8.0 %    Basophil% 0.5 0.0 - 1.9 %    Differential Method Automated    Comprehensive Metabolic Panel (CMP)    Collection Time: 06/27/19  4:57 AM   Result Value Ref Range    Sodium 137 136 - 145 mmol/L    Potassium 3.5 3.5 - 5.1 mmol/L    Chloride 106 95 - 110 mmol/L    CO2 26 23 - 29 mmol/L    Glucose 56 (L) 70 - 110 mg/dL    BUN, Bld 45 (H) 8 - 23 mg/dL    Creatinine 1.7 (H) 0.5 - 1.4 mg/dL    Calcium 7.4 (L) 8.7 - 10.5 mg/dL    Total Protein 4.3 (L) 6.0 - 8.4 g/dL    Albumin 1.7 (L) 3.5 - 5.2 g/dL    Total Bilirubin 0.3 0.1 - 1.0 mg/dL    Alkaline Phosphatase 114 55 - 135 U/L    AST 27 10 - 40 U/L    ALT 26 10 - 44 U/L    Anion Gap 5 (L) 8 - 16 mmol/L    eGFR if African American 48.2 (A) >60 mL/min/1.73 m^2    eGFR if non  41.7 (A) >60 mL/min/1.73 m^2   POCT glucose    Collection Time: 06/27/19  7:53 AM   Result Value Ref Range    POCT Glucose 228 (H) 70 - 110 mg/dL   Transthoracic echo (TTE) 2D with Color Flow    Collection Time: 06/27/19  1:11 PM   Result Value Ref Range    Ascending aorta 3.76 cm    STJ 3.22 cm    AV mean gradient 2 mmHg    Ao peak mini 0.92 m/s    Ao VTI 15.92 cm    IVS 0.75 0.6 - 1.1 cm    LA size 3.94 cm    Left Atrium Major Axis 4.71 cm    Left Atrium Minor Axis 4.92 cm    LVIDD 4.76 3.5 - 6.0 cm    LVIDS 4.17 (A) 2.1 - 4.0 cm    LVOT diameter 1.98 cm    LVOT peak VTI 13.66 cm    PW 0.90 0.6 - 1.1 cm    RA Major Axis 3.27 cm    RA Width 2.59 cm    RVDD 3.13 cm    Sinus 3.47 cm    TAPSE 1.81 cm    TDI LATERAL 0.14 m/s    TDI SEPTAL 0.07 m/s    LA WIDTH 2.26 cm    LV Diastolic Volume 105.39 mL    LV Systolic Volume 77.15 mL    LVOT peak mini 0.58 m/s    FS 12 %    LA volume 36.43 cm3    LV mass 130.00 g    Left Ventricle Relative Wall Thickness 0.38 cm    AV valve area 2.64 cm2    AV Velocity Ratio 0.63     AV index (prosthetic)  0.86     Mean e' 0.1 m/s    LVOT area 3.1 cm2    LVOT stroke volume 42.04 cm3    AV peak gradient 3 mmHg    LV Systolic Volume Index 33.4 mL/m2    LV Diastolic Volume Index 45.66 mL/m2    LA Volume Index 15.8 mL/m2    LV Mass Index 56 g/m2    BSA 2.41 m2    Right Atrial Pressure (from IVC) 3 mmHg         Pending Diagnostic Studies:     Procedure Component Value Units Date/Time    IR Paracentesis with Imaging [[id number]]     Order Status:  Sent Lab Status:  No result         Indwelling Lines/Drains at time of discharge:   Lines/Drains/Airways     Peripherally Inserted Central Catheter Line                 PICC Double Lumen 05/17/19 1717  40 days                Time spent on the discharge of patient: 35 minutes  Patient was seen and examined on the date of discharge and determined to be suitable for discharge.         KAREN Castillo MD  Department of Hospital Medicine  [facility]

## 2019-06-27 NOTE — PLAN OF CARE
Problem: Physical Therapy Goal  Goal: Physical Therapy Goal  Goals to be met by: 19     Patient will increase functional independence with mobility by performin. Supine to sit with Contact Guard Assistance  2. Sit to supine with MInimal Assistance  3. Sit to stand transfer with Stand-by Assistance with RW  4. Bed to chair transfer with Stand-by Assistance using Rolling Walker  5. Gait  x 30 feet with Contact Guard Assistance using Rolling Walker.   6. Stand for 3 minutes with Stand-by Assistance using Rolling Walker  7. Lower extremity exercise program x20 reps  with assistance as needed     Goals remain appropriate. Continue with Physical therapy Plan of Care. Aimee aLl, PT 2019

## 2019-06-28 ENCOUNTER — EXTERNAL HOME HEALTH (OUTPATIENT)
Dept: HOME HEALTH SERVICES | Facility: HOSPITAL | Age: 64
End: 2019-06-28
Payer: MEDICARE

## 2019-06-28 ENCOUNTER — ANESTHESIA EVENT (OUTPATIENT)
Dept: SURGERY | Facility: HOSPITAL | Age: 64
DRG: 981 | End: 2019-06-28
Payer: COMMERCIAL

## 2019-06-28 ENCOUNTER — ANESTHESIA (OUTPATIENT)
Dept: SURGERY | Facility: HOSPITAL | Age: 64
DRG: 981 | End: 2019-06-28
Payer: COMMERCIAL

## 2019-06-28 PROBLEM — L97.929 VENOUS STASIS ULCER OF LEFT LOWER LEG WITH EDEMA OF LEFT LOWER LEG: Status: ACTIVE | Noted: 2019-06-28

## 2019-06-28 PROBLEM — I83.029 VENOUS STASIS ULCER OF LEFT LOWER LEG WITH EDEMA OF LEFT LOWER LEG: Status: ACTIVE | Noted: 2019-06-28

## 2019-06-28 PROBLEM — I83.892 VENOUS STASIS ULCER OF LEFT LOWER LEG WITH EDEMA OF LEFT LOWER LEG: Status: ACTIVE | Noted: 2019-06-28

## 2019-06-28 PROBLEM — R60.0 VENOUS STASIS ULCER OF LEFT LOWER LEG WITH EDEMA OF LEFT LOWER LEG: Status: ACTIVE | Noted: 2019-06-28

## 2019-06-28 LAB
ALBUMIN SERPL BCP-MCNC: 2.1 G/DL (ref 3.5–5.2)
ALP SERPL-CCNC: 121 U/L (ref 55–135)
ALT SERPL W/O P-5'-P-CCNC: 27 U/L (ref 10–44)
ANION GAP SERPL CALC-SCNC: 4 MMOL/L (ref 8–16)
AST SERPL-CCNC: 27 U/L (ref 10–40)
BASOPHILS # BLD AUTO: 0.02 K/UL (ref 0–0.2)
BASOPHILS NFR BLD: 0.5 % (ref 0–1.9)
BILIRUB SERPL-MCNC: 0.4 MG/DL (ref 0.1–1)
BUN SERPL-MCNC: 35 MG/DL (ref 8–23)
CALCIUM SERPL-MCNC: 7.7 MG/DL (ref 8.7–10.5)
CHLORIDE SERPL-SCNC: 102 MMOL/L (ref 95–110)
CO2 SERPL-SCNC: 28 MMOL/L (ref 23–29)
CREAT SERPL-MCNC: 1.2 MG/DL (ref 0.5–1.4)
DIFFERENTIAL METHOD: ABNORMAL
EOSINOPHIL # BLD AUTO: 0.1 K/UL (ref 0–0.5)
EOSINOPHIL NFR BLD: 1.9 % (ref 0–8)
ERYTHROCYTE [DISTWIDTH] IN BLOOD BY AUTOMATED COUNT: 14.4 % (ref 11.5–14.5)
EST. GFR  (AFRICAN AMERICAN): >60 ML/MIN/1.73 M^2
EST. GFR  (NON AFRICAN AMERICAN): >60 ML/MIN/1.73 M^2
GLUCOSE SERPL-MCNC: 215 MG/DL (ref 70–110)
HCT VFR BLD AUTO: 25.4 % (ref 40–54)
HGB BLD-MCNC: 8.2 G/DL (ref 14–18)
INR PPP: 1.1 (ref 0.8–1.2)
LYMPHOCYTES # BLD AUTO: 0.8 K/UL (ref 1–4.8)
LYMPHOCYTES NFR BLD: 21.5 % (ref 18–48)
MAGNESIUM SERPL-MCNC: 1.8 MG/DL (ref 1.6–2.6)
MCH RBC QN AUTO: 28.3 PG (ref 27–31)
MCHC RBC AUTO-ENTMCNC: 32.3 G/DL (ref 32–36)
MCV RBC AUTO: 88 FL (ref 82–98)
MONOCYTES # BLD AUTO: 0.3 K/UL (ref 0.3–1)
MONOCYTES NFR BLD: 7.3 % (ref 4–15)
NEUTROPHILS # BLD AUTO: 2.5 K/UL (ref 1.8–7.7)
NEUTROPHILS NFR BLD: 68.8 % (ref 38–73)
PHOSPHATE SERPL-MCNC: 3 MG/DL (ref 2.7–4.5)
PLATELET # BLD AUTO: 240 K/UL (ref 150–350)
PMV BLD AUTO: 8.5 FL (ref 9.2–12.9)
POCT GLUCOSE: 219 MG/DL (ref 70–110)
POCT GLUCOSE: 253 MG/DL (ref 70–110)
POCT GLUCOSE: 296 MG/DL (ref 70–110)
POTASSIUM SERPL-SCNC: 3.7 MMOL/L (ref 3.5–5.1)
PROT SERPL-MCNC: 4.6 G/DL (ref 6–8.4)
PROTHROMBIN TIME: 11 SEC (ref 9–12.5)
RBC # BLD AUTO: 2.9 M/UL (ref 4.6–6.2)
SODIUM SERPL-SCNC: 134 MMOL/L (ref 136–145)
WBC # BLD AUTO: 3.68 K/UL (ref 3.9–12.7)

## 2019-06-28 PROCEDURE — 94761 N-INVAS EAR/PLS OXIMETRY MLT: CPT

## 2019-06-28 PROCEDURE — 63600175 PHARM REV CODE 636 W HCPCS

## 2019-06-28 PROCEDURE — 25000003 PHARM REV CODE 250: Performed by: STUDENT IN AN ORGANIZED HEALTH CARE EDUCATION/TRAINING PROGRAM

## 2019-06-28 PROCEDURE — 25000003 PHARM REV CODE 250: Performed by: SURGERY

## 2019-06-28 PROCEDURE — P9047 ALBUMIN (HUMAN), 25%, 50ML: HCPCS | Mod: JG | Performed by: STUDENT IN AN ORGANIZED HEALTH CARE EDUCATION/TRAINING PROGRAM

## 2019-06-28 PROCEDURE — 25000003 PHARM REV CODE 250

## 2019-06-28 PROCEDURE — S0171 BUMETANIDE 0.5 MG: HCPCS | Performed by: STUDENT IN AN ORGANIZED HEALTH CARE EDUCATION/TRAINING PROGRAM

## 2019-06-28 PROCEDURE — 85025 COMPLETE CBC W/AUTO DIFF WBC: CPT

## 2019-06-28 PROCEDURE — S0028 INJECTION, FAMOTIDINE, 20 MG: HCPCS

## 2019-06-28 PROCEDURE — 71000033 HC RECOVERY, INTIAL HOUR

## 2019-06-28 PROCEDURE — 11000001 HC ACUTE MED/SURG PRIVATE ROOM

## 2019-06-28 PROCEDURE — 63600175 PHARM REV CODE 636 W HCPCS: Mod: JG | Performed by: STUDENT IN AN ORGANIZED HEALTH CARE EDUCATION/TRAINING PROGRAM

## 2019-06-28 PROCEDURE — 80053 COMPREHEN METABOLIC PANEL: CPT

## 2019-06-28 PROCEDURE — 63600175 PHARM REV CODE 636 W HCPCS: Performed by: NURSE ANESTHETIST, CERTIFIED REGISTERED

## 2019-06-28 PROCEDURE — 25000003 PHARM REV CODE 250: Performed by: NURSE ANESTHETIST, CERTIFIED REGISTERED

## 2019-06-28 PROCEDURE — S0077 INJECTION, CLINDAMYCIN PHOSP: HCPCS

## 2019-06-28 PROCEDURE — 25000003 PHARM REV CODE 250: Performed by: RADIOLOGY

## 2019-06-28 PROCEDURE — 25500020 PHARM REV CODE 255: Performed by: SURGERY

## 2019-06-28 PROCEDURE — 97163 PT EVAL HIGH COMPLEX 45 MIN: CPT

## 2019-06-28 PROCEDURE — 85610 PROTHROMBIN TIME: CPT

## 2019-06-28 PROCEDURE — 84100 ASSAY OF PHOSPHORUS: CPT

## 2019-06-28 PROCEDURE — 37000008 HC ANESTHESIA 1ST 15 MINUTES

## 2019-06-28 PROCEDURE — 37000009 HC ANESTHESIA EA ADD 15 MINS

## 2019-06-28 PROCEDURE — 97530 THERAPEUTIC ACTIVITIES: CPT

## 2019-06-28 PROCEDURE — 83735 ASSAY OF MAGNESIUM: CPT

## 2019-06-28 RX ORDER — LIDOCAINE HYDROCHLORIDE 10 MG/ML
INJECTION INFILTRATION; PERINEURAL CODE/TRAUMA/SEDATION MEDICATION
Status: COMPLETED | OUTPATIENT
Start: 2019-06-28 | End: 2019-06-28

## 2019-06-28 RX ORDER — PROPOFOL 10 MG/ML
VIAL (ML) INTRAVENOUS
Status: DISCONTINUED | OUTPATIENT
Start: 2019-06-28 | End: 2019-06-28

## 2019-06-28 RX ORDER — SPIRONOLACTONE 25 MG/1
50 TABLET ORAL 2 TIMES DAILY
Status: DISCONTINUED | OUTPATIENT
Start: 2019-06-28 | End: 2019-07-01

## 2019-06-28 RX ORDER — PROPOFOL 10 MG/ML
VIAL (ML) INTRAVENOUS CONTINUOUS PRN
Status: DISCONTINUED | OUTPATIENT
Start: 2019-06-28 | End: 2019-06-28

## 2019-06-28 RX ORDER — ALBUMIN HUMAN 250 G/1000ML
12.5 SOLUTION INTRAVENOUS ONCE
Status: COMPLETED | OUTPATIENT
Start: 2019-06-28 | End: 2019-06-28

## 2019-06-28 RX ORDER — DOPAMINE HYDROCHLORIDE 160 MG/100ML
INJECTION, SOLUTION INTRAVENOUS CONTINUOUS PRN
Status: DISCONTINUED | OUTPATIENT
Start: 2019-06-28 | End: 2019-06-28

## 2019-06-28 RX ORDER — BUMETANIDE 0.25 MG/ML
1 INJECTION INTRAMUSCULAR; INTRAVENOUS ONCE
Status: COMPLETED | OUTPATIENT
Start: 2019-06-28 | End: 2019-06-28

## 2019-06-28 RX ORDER — LIDOCAINE HCL/PF 100 MG/5ML
SYRINGE (ML) INTRAVENOUS
Status: DISCONTINUED | OUTPATIENT
Start: 2019-06-28 | End: 2019-06-28

## 2019-06-28 RX ORDER — SODIUM CHLORIDE 9 MG/ML
INJECTION, SOLUTION INTRAVENOUS CONTINUOUS PRN
Status: DISCONTINUED | OUTPATIENT
Start: 2019-06-28 | End: 2019-06-28

## 2019-06-28 RX ORDER — ONDANSETRON 2 MG/ML
INJECTION INTRAMUSCULAR; INTRAVENOUS
Status: DISCONTINUED | OUTPATIENT
Start: 2019-06-28 | End: 2019-06-28

## 2019-06-28 RX ORDER — HEPARIN SODIUM 1000 [USP'U]/ML
INJECTION, SOLUTION INTRAVENOUS; SUBCUTANEOUS
Status: DISCONTINUED | OUTPATIENT
Start: 2019-06-28 | End: 2019-06-28

## 2019-06-28 RX ORDER — MIDAZOLAM HYDROCHLORIDE 1 MG/ML
INJECTION, SOLUTION INTRAMUSCULAR; INTRAVENOUS
Status: DISCONTINUED | OUTPATIENT
Start: 2019-06-28 | End: 2019-06-28

## 2019-06-28 RX ADMIN — GELATIN ABSORBABLE SPONGE SIZE 100 2 APPLICATOR: MISC at 05:06

## 2019-06-28 RX ADMIN — LIDOCAINE HYDROCHLORIDE 10 ML: 10 INJECTION, SOLUTION INFILTRATION; PERINEURAL at 02:06

## 2019-06-28 RX ADMIN — FAMOTIDINE 20 MG: 10 INJECTION, SOLUTION INTRAVENOUS at 08:06

## 2019-06-28 RX ADMIN — SODIUM CHLORIDE: 0.9 INJECTION, SOLUTION INTRAVENOUS at 02:06

## 2019-06-28 RX ADMIN — CLINDAMYCIN IN 5 PERCENT DEXTROSE 600 MG: 12 INJECTION, SOLUTION INTRAVENOUS at 05:06

## 2019-06-28 RX ADMIN — PANCRELIPASE 2 CAPSULE: 30000; 6000; 19000 CAPSULE, DELAYED RELEASE PELLETS ORAL at 08:06

## 2019-06-28 RX ADMIN — INSULIN ASPART 2 UNITS: 100 INJECTION, SOLUTION INTRAVENOUS; SUBCUTANEOUS at 06:06

## 2019-06-28 RX ADMIN — ACETYLCYSTEINE: 200 INJECTION, SOLUTION INTRAVENOUS at 08:06

## 2019-06-28 RX ADMIN — ACETYLCYSTEINE: 200 INJECTION, SOLUTION INTRAVENOUS at 09:06

## 2019-06-28 RX ADMIN — LIDOCAINE HYDROCHLORIDE 50 MG: 20 INJECTION, SOLUTION INTRAVENOUS at 02:06

## 2019-06-28 RX ADMIN — PANCRELIPASE 1 CAPSULE: 24000; 76000; 120000 CAPSULE, DELAYED RELEASE PELLETS ORAL at 08:06

## 2019-06-28 RX ADMIN — SPIRONOLACTONE 50 MG: 25 TABLET, FILM COATED ORAL at 08:06

## 2019-06-28 RX ADMIN — TRAMADOL HYDROCHLORIDE 100 MG: 50 TABLET, FILM COATED ORAL at 08:06

## 2019-06-28 RX ADMIN — FUROSEMIDE 80 MG: 40 TABLET ORAL at 08:06

## 2019-06-28 RX ADMIN — PROPOFOL 75 MCG/KG/MIN: 10 INJECTION, EMULSION INTRAVENOUS at 02:06

## 2019-06-28 RX ADMIN — HEPARIN SODIUM 2500 UNITS: 1000 INJECTION, SOLUTION INTRAVENOUS; SUBCUTANEOUS at 05:06

## 2019-06-28 RX ADMIN — IOHEXOL 75 ML: 350 INJECTION, SOLUTION INTRAVENOUS at 03:06

## 2019-06-28 RX ADMIN — CLINDAMYCIN IN 5 PERCENT DEXTROSE 600 MG: 12 INJECTION, SOLUTION INTRAVENOUS at 10:06

## 2019-06-28 RX ADMIN — BUMETANIDE 1 MG: 0.25 INJECTION INTRAMUSCULAR; INTRAVENOUS at 08:06

## 2019-06-28 RX ADMIN — ALBUMIN (HUMAN) 12.5 G: 25 SOLUTION INTRAVENOUS at 08:06

## 2019-06-28 RX ADMIN — AMPICILLIN SODIUM AND SULBACTAM SODIUM 3 G: 2; 1 INJECTION, POWDER, FOR SOLUTION INTRAMUSCULAR; INTRAVENOUS at 02:06

## 2019-06-28 RX ADMIN — DOPAMINE HYDROCHLORIDE 2 MCG/KG/MIN: 160 INJECTION, SOLUTION INTRAVENOUS at 02:06

## 2019-06-28 RX ADMIN — ONDANSETRON 8 MG: 2 INJECTION, SOLUTION INTRAMUSCULAR; INTRAVENOUS at 02:06

## 2019-06-28 RX ADMIN — IOHEXOL 100 ML: 350 INJECTION, SOLUTION INTRAVENOUS at 04:06

## 2019-06-28 RX ADMIN — PROPOFOL 50 MG: 10 INJECTION, EMULSION INTRAVENOUS at 02:06

## 2019-06-28 RX ADMIN — TRAMADOL HYDROCHLORIDE 100 MG: 50 TABLET, FILM COATED ORAL at 06:06

## 2019-06-28 RX ADMIN — ONDANSETRON 4 MG: 2 INJECTION INTRAMUSCULAR; INTRAVENOUS at 09:06

## 2019-06-28 RX ADMIN — MIDAZOLAM 2 MG: 1 INJECTION INTRAMUSCULAR; INTRAVENOUS at 02:06

## 2019-06-28 NOTE — PROCEDURES
Interventional Radiology Post-Procedure Note    Pre Op Diagnosis: PV stenosis  Post Op Diagnosis: Same    Procedure: Paracentesis, transhepatic portal venogram, pressure measurements, and venoplasty    Procedure performed by: Carlos    Written Informed Consent Obtained: Yes  Specimen Sent: No  Estimated Blood Loss: Less than 100     Findings:   Moderate ascites. 2 L reddish linda fluid removed from the RUQ.    Mildly stenotic proximal portal vein near the confluence of the MPV and SMV. Gradient 3 mmHg. Serially dilated to 12 mm with improvement in gradient to 1 mmHg. Transhepatic access embolized with 6 mm x 9 cm and 5 x 11 cm Terumo 025 Azur HydroCoils and Gel-Foam slurry.    No immediate complications. Patient tolerated procedure well. Please see full dictated procedure report for additional details and recommendations.      Carlos Gonzalez MD  [facility]  Pager [phone]

## 2019-06-28 NOTE — NURSING
Went to CT scan department at 04:10 and came back at 04:50am. Patient co-operated well during the procedure.

## 2019-06-28 NOTE — PT/OT/SLP EVAL
Physical Therapy Evaluation    Patient Name:  Jian Arrieta   MRN:  [id number]    Recommendations:     Discharge Recommendations:  nursing facility, skilled   Discharge Equipment Recommendations: (Defer to SNF)   Barriers to discharge: Decreased caregiver support    Assessment:     Jian Arrieta is a 63 y.o. male admitted with a medical diagnosis of <principal problem not specified>.  He presents with the following impairments/functional limitations:  weakness, impaired functional mobilty, impaired cognition, decreased safety awareness, impaired coordination, impaired endurance, gait instability, decreased coordination, impaired joint extensibility, impaired balance, impaired sensation, decreased upper extremity function, impaired skin, impaired self care skills, decreased lower extremity function, decreased ROM. Pt required min A for supine > sit; min A for STS; CGA for scooting x3 at EOB.    Rehab Prognosis: Good; patient would benefit from acute skilled PT services to address these deficits and reach maximum level of function.    Recent Surgery: Procedure(s) (LRB):  ANGIOGRAM-PV STENT (N/A) Day of Surgery    Plan:     During this hospitalization, patient to be seen 6 x/week to address the identified rehab impairments via gait training, therapeutic activities, therapeutic exercises, neuromuscular re-education and progress toward the following goals:    · Plan of Care Expires:  07/28/19    Subjective     Chief Complaint: r/o LE was uncomfortable as both were swollen  Patient/Family Comments/goals: agreed to PT POC  Pain/Comfort:  · Pain Rating 1: (did not rate)    Patients cultural, spiritual, Oriental orthodox conflicts given the current situation: no    Living Environment:  Pt lives in 1SH, 4 steps and no HR to enter with his spouse who works; per his sister, pt is alone at at home during the day while his wife works; and he's demonstrated significant physical (and cognitive decline) in the last 7 months, which prior to  "this he was Mod I / I; For the past few months; pt is able to take 3-4 steps before needing to sit; transfers c/ SBA <> SPV from bed to electric WC and able to maintain mobility using his electric WC.  Equipment used at home: bedside commode, wheelchair, hospital bed, grab bar(electric WC).  DME owned (not currently used): none.  Upon discharge, patient will have assistance from SNF staff (pt has limited family help on DC).    Objective:     Communicated with RNLuisa prior to session.  Patient found supine with telemetry  upon PT entry to room.    General Precautions: Standard, fall, deaf(R ear)   Orthopedic Precautions:N/A   Braces: N/A     Exams:  · Cognitive Exam:  Patient is oriented to Person and confused speech intermittently  · Gross Motor Coordination:  impaired c/ functional scooting at EOB  · Sensation:    · -       Impaired  light/touch LE  · Skin Integrity/Edema:      · -       Edema: Moderate in marysol LE; R LE charcot  · RLE ROM: WFL  · RLE Strength: 3-/5 in hip flex & knee ext; ankle NT 2/2  charcots  · LLE ROM: WFL  · LLE Strength: 3-5/ grossly; pt unable to perform SLR    Functional Mobility:  · Bed Mobility:     · Rolling Right: contact guard assistance  · Scooting: minimum assistance and at EOB x 3; 2 rest breaks 2/2 pt r/o feeling "weak"  · Supine to Sit: minimum assistance  · Sit to Supine: maximal assistance and assist c/ LE  · Transfers:     · Sit to Stand:  minimum assistance with rolling walker  · Gait: Min A x/ RW; 1 side step to HOB c/ RW; VC for safety; r/o reported feeling weak  · Balance: dynsmic gait; fair      Therapeutic Activities and Exercises:  PT arnaldo completed c/ progressive mobility detailed as above;  Pt was able to perform 1rep of STS but r/o feeling significantly weak; on sitting back EOB pt was dozing off during conversation and demo intermittently ?confused answers to questions; pt returned to supine and RN notified; BS -263mg/dL; BP 97/69mmHg.    AM-PAC 6 CLICK " MOBILITY  Total Score:17     Patient left supine with all lines intact, call button in reach, bed alarm on, RN notified and RN; PCT and sister present.    GOALS:   Multidisciplinary Problems     Physical Therapy Goals     Not on file          Multidisciplinary Problems (Resolved)        Problem: Physical Therapy Goal    Goal Priority Disciplines Outcome Goal Variances Interventions   Physical Therapy Goal   (Resolved)     PT, PT/OT Outcome(s) achieved     Description:  Goals to be met by: 2019    Patient will increase functional independence with mobility by performin. Sit to supine with Stand-by Assistance  2. Sit to stand transfer with Supervision  3. Bed to chair transfer with Stand-by Assistance using Rolling Walker  3. Gait  x20 feet with Contact Guard Assistance using Rolling Walker.   4. Lower extremity exercise program x12 reps per handout, with supervision                      History:     Past Medical History:   Diagnosis Date    Alcohol abuse     Anasarca 2019    Arthropathy associated with neurological disorder 2015    Atherosclerosis     Charcot foot due to diabetes mellitus     Chronic combined systolic and diastolic heart failure 2019 Left VentricleModerate decreased ejection fraction at 30%. Normal left ventricular cavity size. Normal wall thickness observed. Grade I (mild) left ventricular diastolic dysfunction consistent with impaired relaxation. Normal left atrial pressure. Moderate, global hypokinesis(see wall scoring diagram). Right VentricleNormal cavity size, wall thickness and ejection fraction. Wall motion n    Chronic pancreatitis 2019    Colon polyps     approx 5 yrs ago    Coronary artery disease due to calcified coronary lesion 2015    5 stents on ASA      Diabetic polyneuropathy associated with type 2 diabetes mellitus 2015    Diverticulosis 2019    DM type 2 with diabetic peripheral neuropathy 2019    Encounter  for blood transfusion     Essential hypertension 1/28/2019    Former smoker 8/26/2015    Healed ulcer of left foot on examination 6/20/2017    Hydrocele     approx 1.5 yrs ago    Hypoalbuminemia 2/4/2019    Lymphedema of both lower extremities 1/29/2019    Mixed hyperlipidemia 5/8/2015    Morbid obesity with BMI of 50.0-59.9, adult 5/8/2015    Onychomycosis of multiple toenails with type 2 diabetes mellitus and peripheral neuropathy 6/20/2017    Perianal cyst     approx 2 yrs ago    Pseudocyst of pancreas 1/28/2019 1-28-19 Liver has a cirrhotic morphology with no focal lesions.  Significant interval increase in ascites when compared to prior exam which may account for patient's abdominal distension.  Hypodense air-fluid collection along the body of the pancreas which is slightly smaller when compared to prior CT.  Findings may relate to pancreatic necrosis with pancreatic pseudocysts with infected pseudocyst    Skin cancer     skin cancer    Sleep apnea 8/26/2015    Status post bariatric surgery 1/11/2016    Type 2 diabetes mellitus, with long-term current use of insulin 5/8/2015       Past Surgical History:   Procedure Laterality Date    ANGIOGRAM, CORONARY ARTERY Right 3/20/2019    [facility] CATH LAB    ANGIOPLASTY      total x5 stents    Bypass graft study  3/20/2019    Performed by [facility] at [facility] CATH LAB    COLONOSCOPY N/A 10/6/2015    Performed by Shekhar Richards MD at [facility] ENDO (2ND FLR)    CORONARY ARTERY BYPASS GRAFT  2017    x3    CYST REMOVAL      GASTRECTOMY      GASTRECTOMY-SLEEVE-LAPAROSCOPIC - 05108 N/A 12/22/2015    Performed by Micheal Villavicencio Jr., MD at [facility] OR 2ND FLR    KNEE ARTHROSCOPY      perianal surgery      perianal cyst removed    pleurx N/A 5/17/2019    Performed by [facility] at [facility] OR 2ND FLR       Time Tracking:     PT Received On: 06/28/19  PT Start Time: 1014     PT Stop Time: 1049  PT  Total Time (min): 35 min     Billable Minutes: Evaluation 10 and Therapeutic Activity 25      Leticia Ovalle, PT  06/28/2019

## 2019-06-28 NOTE — PLAN OF CARE
Problem: Adult Inpatient Plan of Care  Goal: Plan of Care Review  Outcome: Ongoing (interventions implemented as appropriate)  Patient Mr. Arrieta is AAO X 4. Vital signs stable. Had a CT scan around 4 : 30 am. Skin cleansed with Chlorhexidine. Dopamine continuous (Non - titrating dose) infusion in progress. Kept him NPO from 12 midnight. Has been voiding frequently. On tele monitor shows Sinus Tachy with PVC's. Safety measures maintained. IV antibiotics Clindamycin given. He is for IR this morning. Will continue to monitor patient.

## 2019-06-28 NOTE — PT/OT/SLP PROGRESS
Occupational Therapy  Missed Visit    Patient Name:  Jian Arrieta   MRN:  [id number]    Patient not seen today secondary to pt FABI for IR procedure. Will follow-up as able.    Shira Churchill OT  6/28/2019

## 2019-06-28 NOTE — PLAN OF CARE
Problem: Adult Inpatient Plan of Care  Goal: Plan of Care Review  Outcome: Ongoing (interventions implemented as appropriate)  O2 saturation 97% on nasal cannula 2 lpm. Will continue to monitor.

## 2019-06-28 NOTE — PROGRESS NOTES
Progress Note    Admit Date: 6/27/2019   LOS: 1 day     SUBJECTIVE:     SUEEO. Remained afebrile. Was pre-treated for CT abd/pelvis with contrast (triple phase) given ELIEZER (Cr. 1.7 on admission). Cr1.2 this morning. States he feels okay this morning. Will go to IR for procedure today.     Scheduled Meds:   custom IVPB builder   Intravenous BID    atorvastatin  40 mg Oral Daily    bumetanide  1 mg Intravenous Once    ceFAZolin (ANCEF) IVPB  2 g Intravenous 30 Min Pre-Op    clindamycin 600 MG/50 ML D5W  600 mg Intravenous Q8H    custom IV infusion builder   Intravenous Once    famotidine (PF)  20 mg Intravenous BID    furosemide  80 mg Oral BID    lidocaine (PF) 10 mg/ml (1%)  1 mL Other Once    lipase-protease-amylase 24,000-76,000-120,000 units  1 capsule Oral TID WM    And    lipase-protease-amylase 6,000-19,000-30,000 units  2 capsule Oral TID WM    mannitol 25%  12.5 g Intravenous Once    metoprolol succinate  50 mg Oral Daily    spironolactone  25 mg Oral BID    tamsulosin  1 capsule Oral Daily     Continuous Infusions:   DOPamine 2 mcg/kg/min (06/27/19 2120)     PRN Meds:acetaminophen, albuterol-ipratropium, Dextrose 10% Bolus, glucagon (human recombinant), insulin aspart U-100, ondansetron, ramelteon, senna-docusate 8.6-50 mg, sodium chloride 0.9%, traMADol, traMADol    Review of patient's allergies indicates:  No Known Allergies      OBJECTIVE:     Vital Signs (Most Recent)  Temp: 98.5 °F (36.9 °C) (06/28/19 0851)  Pulse: (!) 119 (06/28/19 0851)  Resp: 18 (06/28/19 0512)  BP: (!) 88/53 (06/28/19 0851)  SpO2: 97 % (06/28/19 0920)    Vital Signs Range (Last 24H):  Temp:  [97.4 °F (36.3 °C)-98.5 °F (36.9 °C)]   Pulse:  []   Resp:  [16-18]   BP: ()/(50-68)   SpO2:  [92 %-100 %]     I & O (Last 24H):    Intake/Output Summary (Last 24 hours) at 6/28/2019 1017  Last data filed at 6/28/2019 0600  Gross per 24 hour   Intake 1710 ml   Output 3170 ml   Net -1460 ml     Physical Exam:  NAD,  AAO  Morbidly obese  RRR  Non-labored breathing, good respiratory effort  abd soft, obese, non-tender, no rebound, no guarding, no peritoneal signs  Severe BLE lymphedema with some weeping noted to R-shin    Laboratory:  CBC:   Recent Labs   Lab 06/28/19  0550   WBC 3.68*   RBC 2.90*   HGB 8.2*   HCT 25.4*      MCV 88   MCH 28.3   MCHC 32.3     CMP:   Recent Labs   Lab 06/28/19  0550   *   CALCIUM 7.7*   ALBUMIN 2.1*   PROT 4.6*   *   K 3.7   CO2 28      BUN 35*   CREATININE 1.2   ALKPHOS 121   ALT 27   AST 27   BILITOT 0.4     Coagulation:   Recent Labs   Lab 06/28/19  0550   LABPROT 11.0   INR 1.1     Microbiology Results (last 7 days)     ** No results found for the last 168 hours. **        Recent Labs   Lab 06/25/19  1734   COLORU Yellow   SPECGRAV 1.010   PHUR 5.0   PROTEINUA Negative   NITRITE Negative   LEUKOCYTESUR Negative       Diagnostic Results:  CT abd/pelvis reviewed    ASSESSMENT/PLAN:     63yoM with multiple comorbidities with extrahepatic PV occlusion plus mild cirrhosis with CRI and mild CHF contributing to recalcitrant ascites plus non-compliance with sodium/fluids as well as fatigue due to hypokalemia and fluid overload     -NPO for procedure today, then will resume diabetic 2000cal diet, cardiac diet, 2g Na diet with <1L fluid restritiction  -IV clindamycin  -will have IR procedure today for recannalization of portal vein (will have 2g Ancef on call for procedure)  -continue renal-dosed non-titrating dopamine 2mcg/kg/min  -continue 1200mg IV mucomyst BID  -replete electrolytes prn  -lasix and aldactone for diuresis  -strict I&Os  -wound care consult for weeping BLE lymphedema; elevate BLE  -SCDs bilat for DVT ppx  -Pepcid for GI ppx    Ev Giron MD  6/28/2019  10:20 AM

## 2019-06-28 NOTE — PLAN OF CARE
Problem: Physical Therapy Goal  Goal: Physical Therapy Goal  Goals to be met by: 2019    Patient will increase functional independence with mobility by performin. Sit to supine with Stand-by Assistance  2. Sit to stand transfer with Supervision  3. Bed to chair transfer with Stand-by Assistance using Rolling Walker  3. Gait  x20 feet with Contact Guard Assistance using Rolling Walker.   4. Lower extremity exercise program x12 reps per handout, with supervision    Outcome: Outcome(s) achieved Date Met: 19  PT initial evaluation completed. Plan of care and goals established and discussed with Patient/sister.  Pt required min A for supine > sit; min A for STS; CGA for scooting x3 at EOB.     Discharge Recommendation: SNF  DME Recommendation: Defer to SNF

## 2019-06-28 NOTE — NURSING
Upon VN chart review, Sepsis warning box appeared. VN completed sepsis screening tool. Pt does not meet criteria to initiate sepsis protocol at this time. Charge Nurse Lev notified.

## 2019-06-28 NOTE — PT/OT/SLP PROGRESS
Occupational Therapy      Patient Name:  Jian Arrieta   MRN:  [id number]    Patient not seen today secondary to (Nurse hold Am 2/2 orthostatic BP and going for IR procedure.). Will follow-up as appropriate.    An Loyd, OT  6/28/2019

## 2019-06-28 NOTE — NURSING
Notified Dr. Giron of patients complain of chest pressure.  Rating of 8/10, B/P 131/68,  Sinus Tach, no diaphoresis.  Orders noted.  Informed Bedside nurse Linda.  Called EKG and Radiology.

## 2019-06-28 NOTE — CONSULTS
Interventional Radiology Consult/Pre-Procedure Note      Chief Complaint/Reason for Consult: Intractable ascites    History of Present Illness:  Jian Arrieta is a 63 y.o. male with the PMH listed below who presents with intractable ascites in the setting of chronic pancreatitis and known PV stenosis. Pt undergoing 2x weekly paracentesis. Pt feels that he is declining. Liver Bx neg for cirrhosis. Dr. Rowe feels that the PV stenosis may be the cause of his ascites. IR consulted for portal venogram, angioplasty, and/or stent.    Of note, patient has chronic severe BLE edema which pre-dates the ascites. He has extensive cardiac history and has been taking Plavix until recently.    Epic notes reviewed.    Past Medical History:   Diagnosis Date    Alcohol abuse     Anasarca 1/28/2019    Arthropathy associated with neurological disorder 9/2/2015    Atherosclerosis     Charcot foot due to diabetes mellitus     Chronic combined systolic and diastolic heart failure 01/29/2019 1-28-19 Left VentricleModerate decreased ejection fraction at 30%. Normal left ventricular cavity size. Normal wall thickness observed. Grade I (mild) left ventricular diastolic dysfunction consistent with impaired relaxation. Normal left atrial pressure. Moderate, global hypokinesis(see wall scoring diagram). Right VentricleNormal cavity size, wall thickness and ejection fraction. Wall motion n    Chronic pancreatitis 1/28/2019    Colon polyps     approx 5 yrs ago    Coronary artery disease due to calcified coronary lesion 05/08/2015    5 stents on ASA      Diabetic polyneuropathy associated with type 2 diabetes mellitus 9/2/2015    Diverticulosis 1/28/2019    DM type 2 with diabetic peripheral neuropathy 2/4/2019    Encounter for blood transfusion     Essential hypertension 1/28/2019    Former smoker 8/26/2015    Healed ulcer of left foot on examination 6/20/2017    Hydrocele     approx 1.5 yrs ago    Hypoalbuminemia  2/4/2019    Lymphedema of both lower extremities 1/29/2019    Mixed hyperlipidemia 5/8/2015    Morbid obesity with BMI of 50.0-59.9, adult 5/8/2015    Onychomycosis of multiple toenails with type 2 diabetes mellitus and peripheral neuropathy 6/20/2017    Perianal cyst     approx 2 yrs ago    Pseudocyst of pancreas 1/28/2019 1-28-19 Liver has a cirrhotic morphology with no focal lesions.  Significant interval increase in ascites when compared to prior exam which may account for patient's abdominal distension.  Hypodense air-fluid collection along the body of the pancreas which is slightly smaller when compared to prior CT.  Findings may relate to pancreatic necrosis with pancreatic pseudocysts with infected pseudocyst    Skin cancer     skin cancer    Sleep apnea 8/26/2015    Status post bariatric surgery 1/11/2016    Type 2 diabetes mellitus, with long-term current use of insulin 5/8/2015     Past Surgical History:   Procedure Laterality Date    ANGIOGRAM, CORONARY ARTERY Right 3/20/2019    Performed by [facility] at [facility] CATH LAB    ANGIOPLASTY      total x5 stents    Bypass graft study  3/20/2019    Performed by [facility] at [facility] CATH LAB    COLONOSCOPY N/A 10/6/2015    Performed by Shekhar Richards MD at [facility] ENDO (2ND FLR)    CORONARY ARTERY BYPASS GRAFT  2017    x3    CYST REMOVAL      GASTRECTOMY      GASTRECTOMY-SLEEVE-LAPAROSCOPIC - 07688 N/A 12/22/2015    Performed by Micheal Villavicencio Jr., MD at [facility] OR 2ND FLR    KNEE ARTHROSCOPY      perianal surgery      perianal cyst removed    pleurx N/A 5/17/2019    Performed by [facility] Diagnostic Provider at [facility] OR 2ND FLR       Allergies:   Review of patient's allergies indicates:  No Known Allergies    Scheduled Meds:    custom IVPB builder   Intravenous BID    atorvastatin  40 mg Oral Daily    bumetanide  1 mg Intravenous Once    ceFAZolin (ANCEF) IVPB  2 g Intravenous 30 Min Pre-Op    clindamycin 600 MG/50 ML D5W   600 mg Intravenous Q8H    custom IV infusion builder   Intravenous Once    famotidine (PF)  20 mg Intravenous BID    furosemide  80 mg Oral BID    lidocaine (PF) 10 mg/ml (1%)  1 mL Other Once    lipase-protease-amylase 24,000-76,000-120,000 units  1 capsule Oral TID WM    And    lipase-protease-amylase 6,000-19,000-30,000 units  2 capsule Oral TID WM    mannitol 25%  12.5 g Intravenous Once    metoprolol succinate  50 mg Oral Daily    spironolactone  25 mg Oral BID    tamsulosin  1 capsule Oral Daily     Continuous Infusions:    DOPamine 2 mcg/kg/min (06/27/19 2120)     PRN Meds:acetaminophen, albuterol-ipratropium, Dextrose 10% Bolus, glucagon (human recombinant), insulin aspart U-100, ondansetron, ramelteon, senna-docusate 8.6-50 mg, sodium chloride 0.9%, traMADol, traMADol    Anticoagulation/Antiplatelet Meds: Plavix    Review of Systems:   As documented in admission H&P.    Physical Exam:  Temp: 97.8 °F (36.6 °C) (06/28/19 0512)  Pulse: (!) 123 (06/28/19 0741)  Resp: 18 (06/28/19 0512)  BP: (!) 101/50 (06/28/19 0512)  SpO2: 100 % (06/28/19 0354)     General: WNWD, NAD  HEENT: Normocephalic, sclera anicteric, oropharynx clear  Neck: Supple, no palpable lymphadenopathy  Heart: RRR  Lungs: Symmetric excursions, breathing unlabored  Abd: NTND, soft  Extremities: Severe BLE pitting edema  Neuro: Gross nonfocal    Labs:  Recent Labs   Lab 06/28/19  0550   INR 1.1       Recent Labs   Lab 06/28/19  0550   WBC 3.68*   HGB 8.2*   HCT 25.4*   MCV 88         Recent Labs   Lab 06/28/19  0550   *   *   K 3.7      CO2 28   BUN 35*   CREATININE 1.2   CALCIUM 7.7*   MG 1.8   ALT 27   AST 27   ALBUMIN 2.1*   BILITOT 0.4     Imaging:  CT AP 6/28/19, 3/16/19, 1/28/19 and 11/16/18 reviewed.    Assessment/Plan:   Intractable ascites and mild PV stenosis which appears improved from 1/28/19. Discussed with pt. Explained that the stenosis is improving and it's unclear if the ascites is related. Pt  is exhausted and would like to be aggressive. He requests any intervention which may help. Offered to perform portal venogram and measure pressures. If there's a significant gradient, will angioplasty, and if the gradient fails to improve, may stent. Pt agreeable to this plan. He understands the risk of a bleeding complication due to recent Plavix usage and would like to proceed. He will need a paracentesis as part of this procedure.    Sedation: Per Anesthesia    Risks (including, but not limited to, pain, bleeding, infection, damage to nearby structures (liver, bowel, diaphragm, lung, etc), procedure/treatment failure, the need for additional procedures, death), potential benefits, and alternatives were discussed with the patient. All questions were answered to the best of my abilities. The patient wishes to proceed with the paracentesis, portal venogram, angioplasty and/or stent. Written informed consent was obtained.    D/w Dr. Rowe who is in agreement with the above.      Carlos Gonzalez MD  [facility]  Pager [phone]

## 2019-06-28 NOTE — PROGRESS NOTES
Notified Dr. Villalobos of patient positive for orthostatics. bp sitting on side of bed 69/51 hr 130. Back down lying in bed 109/62 hr 115. No new orders given.

## 2019-06-28 NOTE — NURSING
Met Sedrick Meenakshi around 19:20 hrs at the beginning of the shift. Introduced myself as his bedside nurse for tonight. Orders reviewed and confirmed with Dr. Giron as the volume of Sodium bicarb came in 1000 ml of 5% Dextrose fluid. She spoke to the Pharmacist and confirmed with me saying to give it over an hour. As other medications such as Acetylcysteine, Human Albumin and Mannitol etc. Are also  in progress Sodium Bicarb started with 250 ml as patient also has a vast cardiac history. Vital signs done and they are stable.Will continue to monitor patient.

## 2019-06-28 NOTE — ANESTHESIA PREPROCEDURE EVALUATION
06/28/2019  Jian Arrieta is a 63 y.o., male for an angiogram - pv stent    Review of patient's allergies indicates:  No Known Allergies  Past Medical History:   Diagnosis Date    Alcohol abuse     Anasarca 1/28/2019    Arthropathy associated with neurological disorder 9/2/2015    Atherosclerosis     Charcot foot due to diabetes mellitus     Chronic combined systolic and diastolic heart failure 01/29/2019 1-28-19 Left VentricleModerate decreased ejection fraction at 30%. Normal left ventricular cavity size. Normal wall thickness observed. Grade I (mild) left ventricular diastolic dysfunction consistent with impaired relaxation. Normal left atrial pressure. Moderate, global hypokinesis(see wall scoring diagram). Right VentricleNormal cavity size, wall thickness and ejection fraction. Wall motion n    Chronic pancreatitis 1/28/2019    Colon polyps     approx 5 yrs ago    Coronary artery disease due to calcified coronary lesion 05/08/2015    5 stents on ASA      Diabetic polyneuropathy associated with type 2 diabetes mellitus 9/2/2015    Diverticulosis 1/28/2019    DM type 2 with diabetic peripheral neuropathy 2/4/2019    Encounter for blood transfusion     Essential hypertension 1/28/2019    Former smoker 8/26/2015    Healed ulcer of left foot on examination 6/20/2017    Hydrocele     approx 1.5 yrs ago    Hypoalbuminemia 2/4/2019    Lymphedema of both lower extremities 1/29/2019    Mixed hyperlipidemia 5/8/2015    Morbid obesity with BMI of 50.0-59.9, adult 5/8/2015    Onychomycosis of multiple toenails with type 2 diabetes mellitus and peripheral neuropathy 6/20/2017    Perianal cyst     approx 2 yrs ago    Pseudocyst of pancreas 1/28/2019 1-28-19 Liver has a cirrhotic morphology with no focal lesions.  Significant interval increase in ascites when compared to prior exam which  may account for patient's abdominal distension.  Hypodense air-fluid collection along the body of the pancreas which is slightly smaller when compared to prior CT.  Findings may relate to pancreatic necrosis with pancreatic pseudocysts with infected pseudocyst    Skin cancer     skin cancer    Sleep apnea 8/26/2015    Status post bariatric surgery 1/11/2016    Type 2 diabetes mellitus, with long-term current use of insulin 5/8/2015     Past Surgical History:   Procedure Laterality Date    ANGIOGRAM, CORONARY ARTERY Right 3/20/2019    Performed by [facility]que MD at [facility] CATH LAB    ANGIOPLASTY      total x5 stents    Bypass graft study  3/20/2019    Performed by Bob Duque MD at [facility] CATH LAB    COLONOSCOPY N/A 10/6/2015    Performed by Shekhar Richards MD at [facility] ENDO (2ND FLR)    CORONARY ARTERY BYPASS GRAFT  2017    x3    CYST REMOVAL      GASTRECTOMY      GASTRECTOMY-SLEEVE-LAPAROSCOPIC - 77245 N/A 12/22/2015    Performed by Micheal Villavicencio Jr., MD at [facility] OR 2ND FLR    KNEE ARTHROSCOPY      perianal surgery      perianal cyst removed    pleurx N/A 5/17/2019    Performed by [facility] Diagnostic Provider at [facility] OR 2ND FLR     Patient Active Problem List   Diagnosis    Morbid obesity with BMI of 50.0-59.9, adult    Coronary artery disease due to calcified coronary lesion    Sleep apnea    Diabetic polyneuropathy associated with type 2 diabetes mellitus    Status post bariatric surgery    Anasarca    Atherosclerosis    Chronic pancreatitis    Pseudocyst of pancreas    Chronic diastolic heart failure    Lymphedema of both lower extremities    DM type 2 with diabetic peripheral neuropathy    Hypoalbuminemia    Myocardiopathy    Other ascites    Anemia    NSTEMI (non-ST elevated myocardial infarction)    Paroxysmal atrial fibrillation    Hypertension associated with diabetes    Hyperlipidemia associated with type 2 diabetes mellitus    Obesity hypoventilation syndrome     Fatty liver disease, nonalcoholic    Symptomatic anemia    Alteration in skin integrity    Venous stasis dermatitis of both lower extremities    Stage 3 chronic kidney disease    Acute renal failure with specified lesion    CHF (congestive heart failure)    S/P abdominal paracentesis    Decreased mobility    Discharge planning issues    Malnutrition of moderate degree    Hepatic fibrosis    Cellulitis of right lower extremity without foot    Portal hypertension     Wt Readings from Last 3 Encounters:   06/28/19 118.5 kg (261 lb 3.9 oz)   06/27/19 123.8 kg (273 lb)   06/27/19 123.8 kg (273 lb)     Temp Readings from Last 3 Encounters:   06/28/19 36.9 °C (98.5 °F) (Oral)   06/27/19 36.3 °C (97.4 °F)   06/21/19 37 °C (98.6 °F) (Oral)     BP Readings from Last 3 Encounters:   06/28/19 (!) 88/53   06/27/19 137/67   06/25/19 (!) 115/58     Pulse Readings from Last 3 Encounters:   06/28/19 (!) 119   06/27/19 100   06/25/19 83         Anesthesia Evaluation    I have reviewed the Patient Summary Reports.        Review of Systems      Physical Exam  General:  Well nourished    Airway/Jaw/Neck:  Airway Findings: Mouth Opening: Normal Tongue: Normal  General Airway Assessment: Adult  Mallampati: II  Improves to II with phonation.  TM Distance: Normal, at least 6 cm       Chest/Lungs:  Chest/Lungs Findings: Clear to auscultation, Normal Respiratory Rate     Heart/Vascular:  Heart Findings: Rate: Normal  Rhythm: Regular Rhythm  Sounds: Normal        Mental Status:  Mental Status Findings:  Cooperative, Alert and Oriented       Lab Results   Component Value Date    WBC 3.68 (L) 06/28/2019    HGB 8.2 (L) 06/28/2019    HCT 25.4 (L) 06/28/2019    MCV 88 06/28/2019     06/28/2019       Chemistry        Component Value Date/Time     (L) 06/28/2019 0550    K 3.7 06/28/2019 0550     06/28/2019 0550    CO2 28 06/28/2019 0550    BUN 35 (H) 06/28/2019 0550    CREATININE 1.2 06/28/2019 0550     (H)  06/28/2019 0550        Component Value Date/Time    CALCIUM 7.7 (L) 06/28/2019 0550    ALKPHOS 121 06/28/2019 0550    AST 27 06/28/2019 0550    ALT 27 06/28/2019 0550    BILITOT 0.4 06/28/2019 0550    ESTGFRAFRICA >60 06/28/2019 0550    EGFRNONAA >60 06/28/2019 0550      Conclusion     · Mildly decreased left ventricular systolic function. The estimated ejection fraction is 45-50%  · Left ventricular diastolic dysfunction.  · Normal right ventricular systolic function.  · Normal central venous pressure (3 mm Hg).  · Extremely technically challenging study, poor endocardial visualization, would recommend repeating with contrast if additional information is desired.   Electronically signed by Luisito Joiner MD on 6/27/19 at 1359 CDT           Anesthesia Plan  Type of Anesthesia, risks & benefits discussed:  Anesthesia Type:  MAC, general  Patient's Preference:   Intra-op Monitoring Plan:   Intra-op Monitoring Plan Comments:   Post Op Pain Control Plan:   Post Op Pain Control Plan Comments:   Induction:   IV  Beta Blocker:         Informed Consent: Patient understands risks and agrees with Anesthesia plan.  Questions answered. Anesthesia consent signed with patient.  ASA Score: 3     Day of Surgery Review of History & Physical: I have interviewed and examined the patient. I have reviewed the patient's H&P dated:  There are no significant changes.  H&P update referred to the provider.  H&P completed by Anesthesiologist.       Ready For Surgery From Anesthesia Perspective.

## 2019-06-28 NOTE — H&P
[facility]  History & Physical    SUBJECTIVE:     Chief Complaint/Reason for Admission: portal vein occlusion    History of Present Illness:  63 y.o. male with PMH morbid obesity (BMI 41), stage 1 hepatic fibrosis (biopsy negative for cirrhosis), chronic pancreatitis c/b large volume ascites requiring twice weekly paracenteses in addition to pancreatic pseudocyst, non-compliance with low salt diet/fluid restriction, DM insulin dependent (HgA1c 7.1 on 5/23/19), BLE lymphedema, CHF, pAF on Eliquis and Metoprolol, CAD s/p PCI x5 on Plavix with subsequent CABG x3v (~2017 at ; LIMA-LAD [patent], SVG-OM [patent], SVG-RCA[occluded; L->R collaterals from LAD to PDA]), HTN, HLD, BERHANE on CPAP, R knee OA, Charcot foot, normocytic anemia, who presented to [facility] 6/27/19 as a transfer from [facility] with large volume ascites 2/2 portal vein occlusion requiring IR procedure for recannalization. Patient initially presented to [facility] for worsening RLE pain and bilateral lower extremity lymphedema contributing to progressive immobility combined with wife and sister no longer being able to care for patient. Patient received vancomycin for concern for RLE cellulitis and also had US of BLE which were negative for DVT. Patient has been transferred to surgical service in anticipation of IR procedure tomorrow.     PTA Medications   Medication Sig    albumin human 25% 25 % bottle Inject 100 mLs (25 g total) into the vein 2 (two) times daily. (Patient taking differently: Inject 25 g into the vein 2 (two) times daily. Not daily)    albuterol-ipratropium (DUO-NEB) 2.5 mg-0.5 mg/3 mL nebulizer solution Take 3 mLs by nebulization every 4 (four) hours as needed for Wheezing. Rescue    insulin detemir (LEVEMIR FLEXPEN SUBQ) Inject 14 Units into the skin once daily.     acetaminophen (TYLENOL) 325 MG tablet Take 2 tablets (650 mg total) by mouth every 4 (four) hours as needed.    apixaban  (ELIQUIS) 5 mg Tab Take 1 tablet (5 mg total) by mouth 2 (two) times daily.    atorvastatin (LIPITOR) 40 MG tablet Take 1 tablet (40 mg total) by mouth once daily.    clindamycin 600 MG/50 ML D5W (CLEOCIN) 600 mg/50 mL IVPB Inject 50 mLs (600 mg total) into the vein every 8 (eight) hours.    clopidogrel (PLAVIX) 75 mg tablet Take 1 tablet (75 mg total) by mouth once daily.    cyanocobalamin, vitamin B-12, 500 mcg Subl Place 1 tablet under the tongue every Monday.     furosemide (LASIX) 80 MG tablet Take 1 tablet (80 mg total) by mouth 2 (two) times daily.    insulin aspart U-100 (NOVOLOG) 100 unit/mL injection Inject 4 Units into the skin 3 (three) times daily before meals.    lipase-protease-amylase (CREON) 36,000-114,000- 180,000 unit CpDR Take 1 capsule by mouth 3 (three) times daily.    metoprolol succinate (TOPROL-XL) 50 MG 24 hr tablet Take 1 tablet (50 mg total) by mouth once daily.    omeprazole (PRILOSEC) 40 MG capsule Take 40 mg by mouth once daily.    senna-docusate 8.6-50 mg (PERICOLACE) 8.6-50 mg per tablet Take 1 tablet by mouth 2 (two) times daily as needed for Constipation.    spironolactone (ALDACTONE) 25 MG tablet Take 25 mg by mouth 2 (two) times daily.    tamsulosin (FLOMAX) 0.4 mg Cap Take 1 capsule by mouth once daily. Pt has not started taking as of 2/27/19.       Review of patient's allergies indicates:  No Known Allergies    Past Medical History:   Diagnosis Date    Alcohol abuse     Anasarca 1/28/2019    Arthropathy associated with neurological disorder 9/2/2015    Atherosclerosis     Charcot foot due to diabetes mellitus     Chronic combined systolic and diastolic heart failure 01/29/2019 1-28-19 Left VentricleModerate decreased ejection fraction at 30%. Normal left ventricular cavity size. Normal wall thickness observed. Grade I (mild) left ventricular diastolic dysfunction consistent with impaired relaxation. Normal left atrial pressure. Moderate, global  hypokinesis(see wall scoring diagram). Right VentricleNormal cavity size, wall thickness and ejection fraction. Wall motion n    Chronic pancreatitis 1/28/2019    Colon polyps     approx 5 yrs ago    Coronary artery disease due to calcified coronary lesion 05/08/2015    5 stents on ASA      Diabetic polyneuropathy associated with type 2 diabetes mellitus 9/2/2015    Diverticulosis 1/28/2019    DM type 2 with diabetic peripheral neuropathy 2/4/2019    Essential hypertension 1/28/2019    Former smoker 8/26/2015    Healed ulcer of left foot on examination 6/20/2017    Hydrocele     approx 1.5 yrs ago    Hypoalbuminemia 2/4/2019    Lymphedema of both lower extremities 1/29/2019    Mixed hyperlipidemia 5/8/2015    Morbid obesity with BMI of 50.0-59.9, adult 5/8/2015    Onychomycosis of multiple toenails with type 2 diabetes mellitus and peripheral neuropathy 6/20/2017    Perianal cyst     approx 2 yrs ago    Pseudocyst of pancreas 1/28/2019 1-28-19 Liver has a cirrhotic morphology with no focal lesions.  Significant interval increase in ascites when compared to prior exam which may account for patient's abdominal distension.  Hypodense air-fluid collection along the body of the pancreas which is slightly smaller when compared to prior CT.  Findings may relate to pancreatic necrosis with pancreatic pseudocysts with infected pseudocyst    Skin cancer     skin cancer    Sleep apnea 8/26/2015    Status post bariatric surgery 1/11/2016    Type 2 diabetes mellitus, with long-term current use of insulin 5/8/2015     Past Surgical History:   Procedure Laterality Date    ANGIOGRAM, CORONARY ARTERY Right 3/20/2019    [facility] at [facility] CATH LAB    ANGIOPLASTY      total x5 stents    Bypass graft study  3/20/2019    Performed by [facility]que [facility] at [facility] CATH LAB    COLONOSCOPY N/A 10/6/2015    Performed by Shekhar Richards MD at [facility] ENDO (2ND FLR)    CORONARY ARTERY BYPASS  GRAFT  2017    x3    CYST REMOVAL      GASTRECTOMY      GASTRECTOMY-SLEEVE-LAPAROSCOPIC - 91942 N/A 2015    Performed by [facility]al [facility]icencio Jr., [facility] at [facility] OR 2ND FLR    KNEE ARTHROSCOPY      perianal surgery      perianal cyst removed    pleurx N/A 2019    Performed by [facility] Diagnostic Provider at [facility] OR 2ND FLR     Family History   Problem Relation Age of Onset    Cancer Mother     Cancer Father     Heart disease Father     Obesity Sister     Parkinsonism Brother     No Known Problems Paternal Grandmother     Cancer Paternal Grandfather     Cancer Brother     Diabetes Maternal Grandmother     Stroke Maternal Grandfather     Cirrhosis Neg Hx      Social History     Tobacco Use    Smoking status: Former Smoker     Packs/day: 2.00     Years: 30.00     Pack years: 60.00     Types: Cigarettes     Last attempt to quit: 2005     Years since quittin.4    Smokeless tobacco: Never Used   Substance Use Topics    Alcohol use: No     Comment: started ~, reports 1 shot daily, max 3 shots daily, vague about alcohol consumption. Last drink 2018    Drug use: No        Review of Systems:  12pt ROS performed as per HPI otherwise negative    OBJECTIVE:     Vital Signs (Most Recent):  Temp: 97.9 °F (36.6 °C) (19)  Pulse: 94 (19)  Resp: 18 (19)  BP: (!) 117/59 (19)  SpO2: 99 % (19)    Physical Exam:  NAD, AAO  Morbidly obese  RRR  Non-labored breathing, good respiratory effort  abd soft, obese, non-tender, no rebound, no guarding, no peritoneal signs  Severe BLE lymphedema with some weeping noted to R-shin    Laboratory:  CBC:   Recent Labs   Lab 19   WBC 5.59   RBC 2.81*   HGB 8.2*   HCT 25.4*      MCV 90   MCH 29.2   MCHC 32.3     CMP:   Recent Labs   Lab 19   GLU 56*   CALCIUM 7.4*   ALBUMIN 1.7*   PROT 4.3*      K 3.5   CO2 26      BUN 45*   CREATININE 1.7*   ALKPHOS 114   ALT 26   AST 27    BILITOT 0.3     Coagulation:   Recent Labs   Lab 06/25/19  1643   LABPROT 10.9   INR 1.1     Cardiac markers:   Recent Labs   Lab 06/25/19  1643   TROPONINI <0.006     Recent Labs   Lab 06/25/19  1734   COLORU Yellow   SPECGRAV 1.010   PHUR 5.0   PROTEINUA Negative   NITRITE Negative   LEUKOCYTESUR Negative       Diagnostic Results:  CT abd/pelvis triple phase pending    ASSESSMENT/PLAN:     63yoM with multiple comorbidities with extrahepatic PV occlusion plus mild cirrhosis with CRI and mild CHF contributing to recalcitrant ascites plus non-compliance with sodium/fluids as well as fatigue due to hypokalemia and fluid overload    -admit to surgery  -diabetic 2000cal diet, cardiac diet, 2g Na diet with <1L fluid restritiction  -IV clindamycin  -will plan on IR procedure tomorrow for recannalization of portal vein (will have 2g Ancef on call for procedure tomorrow)  -will get CT abd/pelvis triple phase contrast imaging tonight with pre-treatment for ELIEZER including 12.5g 25% albumin bolus, 12.5g mannitol, 1mg bumex, 1 amp bicarb in D5W (this regimen to   -will start renal-dosed non-titrating dopamine 2mcg/kg/min  -1200mg IV mucomyst BID  -hibiclens bath tonight and tomorrow in anticipation of IR procedure  -replete electrolytes prn  -lasix and aldactone for diuresis  -strict I&Os  -wound care consult for weeping BLE lymphedema; elevate BLE  -SCDs bilat for DVT ppx  -Pepcid for GI ppx    Ev Giron MD  6/27/2019  8:38 PM

## 2019-06-28 NOTE — TRANSFER OF CARE
"Anesthesia Transfer of Care Note    Patient: Jian Arrieta    Procedure(s) Performed: Procedure(s) (LRB):  ANGIOGRAM-PV STENT (N/A)    Patient location: PACU    Anesthesia Type: general    Transport from OR: Transported from OR on room air with adequate spontaneous ventilation    Post pain: adequate analgesia    Post assessment: no apparent anesthetic complications    Post vital signs: stable    Level of consciousness: awake and alert    Nausea/Vomiting: no nausea/vomiting    Complications: none    Transfer of care protocol was followed      Last vitals:   Visit Vitals  /62   Pulse (!) 119   Temp 36.9 °C (98.5 °F) (Oral)   Resp 16   Ht 5' 7" (1.702 m)   Wt 118.5 kg (261 lb 3.9 oz)   SpO2 98%   BMI 40.92 kg/m²     "

## 2019-06-28 NOTE — PLAN OF CARE
Problem: Adult Inpatient Plan of Care  Goal: Plan of Care Review  Outcome: Ongoing (interventions implemented as appropriate)  Admitted to 520. Care plan and education initiated.  Chart review done.

## 2019-06-28 NOTE — PROGRESS NOTES
Pt in surgery with Dr. RANDELL Rowe, spoke with Dr. Villalobos via telephone, wound care orders given, states that it is ok to see pt on Mon 7/1. Green z-boots placed in room, nurse notified to place on pt after he returns from surgery and to pass on in report. Pts wife in room and educated to the importance of wearing the boots to protect heel from pressure injury, teach back was completed.    Recommend Pt to follow up with [facility] wound center, after discharge.

## 2019-06-28 NOTE — PLAN OF CARE
Patient to floor, reported out to Luisa Rn, Transported by SERAFIN Byrne who accompanied my in recovery, patient wide awake whole recovery and not complaining of any pain. Dr Govind miller for sign out

## 2019-06-28 NOTE — PROGRESS NOTES
Pt transferred from [facility] 6/27/19, discharge planning assessment done day before, 6/26/19, at [facility], copied from Gini Cotton, RN note:    Gini Cotton, RN      Care Management   Plan of Care   Addendum             Related encounter: ED to Hosp-Admission (Discharged) from 6/25/2019 in [facility]                  06/26/19 1211   Discharge Assessment   Assessment Type Discharge Planning Assessment   Confirmed/corrected address and phone number on facesheet? Yes   Assessment information obtained from? Patient;Caregiver;Medical Record   Expected Length of Stay (days) 4   Communicated expected length of stay with patient/caregiver yes   Prior to hospitilization cognitive status: Alert/Oriented   Prior to hospitalization functional status: Needs Assistance;Assistive Equipment   Current cognitive status: Alert/Oriented   Current Functional Status: Needs Assistance;Partially Dependent;Assistive Equipment   Facility Arrived From: home via ED   Lives With spouse   Able to Return to Prior Arrangements no  (The patient declining per PT for home health in functional status and may require SNF placement)   Is patient able to care for self after discharge? No   Who are your caregiver(s) and their phone number(s)? Nena devine [facility] [phone]   Patient's perception of discharge disposition skilled nursing facility   Readmission Within the Last 30 Days no previous admission in last 30 days   Patient currently being followed by outpatient case management? No   Patient currently receives any other outside agency services? No   Equipment Currently Used at Home CPAP;glucometer;other (see comments)  (line care supples for saline flushes)   Do you have any problems affording any of your prescribed medications? No   Is the patient taking medications as prescribed? yes   Does the patient have transportation home? Yes   Transportation Anticipated other (see comments);family or friend will  provide  (PT has ambulanace transportation to paracentesis biweekly)   Does the patient receive services at the [facility]? No   Discharge Plan A Skilled Nursing Facility   Discharge Plan B Home with family;Home Health   DME Needed Upon Discharge  other (see comments)  (TBD)   Patient/Family in Agreement with Plan yes      Met with patient in OBS 1A with sister Nena Jackman via speaker phone to discuss discharge planning. The pt was admitted via ED to OBS for worsening R LE edema w/cellulitis. He has a significant h/o CHF, liver fibrosis w/ascites and portal vein obstruction. He is followed by Dr. Nilesh Rowe [phone] at [facility] for Liver/Pancreas neuroendocrine/general surgery. Per progress note he is recommending a possible portal vein SMV stent to be placed. He currently receives biweekly paracentesis and is transported to the hospital via ambulance. [facility] for [facility], PT/OT and lien care for flushes and weekly dressing changes with [facility]. The patient is followed by Family Practitioner Dr. Jovanny Barajas but wishes to change to a internal medicine PCP instead. Discussed plan of care with the staff MD Dr. Castillo regarding the difficulty in finding placement for the patient that requires biweekly paracentesis. His medical condition is chronic and he will likely not be accepted to a [facility] bed and a [facility] placement and his current medical complexity will be difficult if not impossible. The patient and sister are adamant that the patient have the possible portal malini SMV stent placed by Dr. Rowe. Dr. Castillo will contact Dr. Rowe to discuss transfer to [facility] so he may follow the patient to possibly perform the procedure so placement is possible as HH services report a decline in condition and recommend skilled placement. PT consulted pending recs.         [id number] - [place] - [address]  [address]  [place] [address]  Phone: [phone] Fax: [phone]     PCP  Leon Barajas DO   [phone] phone  [phone] fax     Payor: [facility] / Plan: [employer] CHOICE / Product Type: Commercial /      Will continue to follow and help with discharge planning or transfer to [facility] throughout admission.          Revision History     Date/Time User Provider Type Action   6/27/2019  9:49 AM Gini Cotton RN Case Manager Addend   6/26/2019 12:32 PM Gini Cotton RN Case Manager Sign   View Details Report

## 2019-06-29 LAB
ALBUMIN SERPL BCP-MCNC: 2 G/DL (ref 3.5–5.2)
ALP SERPL-CCNC: 107 U/L (ref 55–135)
ALT SERPL W/O P-5'-P-CCNC: 29 U/L (ref 10–44)
ANION GAP SERPL CALC-SCNC: 5 MMOL/L (ref 8–16)
ANION GAP SERPL CALC-SCNC: 7 MMOL/L (ref 8–16)
AST SERPL-CCNC: 25 U/L (ref 10–40)
BASOPHILS # BLD AUTO: 0.02 K/UL (ref 0–0.2)
BASOPHILS NFR BLD: 0.4 % (ref 0–1.9)
BILIRUB SERPL-MCNC: 0.3 MG/DL (ref 0.1–1)
BUN SERPL-MCNC: 29 MG/DL (ref 8–23)
BUN SERPL-MCNC: 31 MG/DL (ref 8–23)
CALCIUM SERPL-MCNC: 7.6 MG/DL (ref 8.7–10.5)
CALCIUM SERPL-MCNC: 7.8 MG/DL (ref 8.7–10.5)
CHLORIDE SERPL-SCNC: 100 MMOL/L (ref 95–110)
CHLORIDE SERPL-SCNC: 99 MMOL/L (ref 95–110)
CO2 SERPL-SCNC: 27 MMOL/L (ref 23–29)
CO2 SERPL-SCNC: 28 MMOL/L (ref 23–29)
CREAT SERPL-MCNC: 1.7 MG/DL (ref 0.5–1.4)
CREAT SERPL-MCNC: 2 MG/DL (ref 0.5–1.4)
DIFFERENTIAL METHOD: ABNORMAL
EOSINOPHIL # BLD AUTO: 0.1 K/UL (ref 0–0.5)
EOSINOPHIL NFR BLD: 1.7 % (ref 0–8)
ERYTHROCYTE [DISTWIDTH] IN BLOOD BY AUTOMATED COUNT: 14.5 % (ref 11.5–14.5)
EST. GFR  (AFRICAN AMERICAN): 40 ML/MIN/1.73 M^2
EST. GFR  (AFRICAN AMERICAN): 48 ML/MIN/1.73 M^2
EST. GFR  (NON AFRICAN AMERICAN): 34 ML/MIN/1.73 M^2
EST. GFR  (NON AFRICAN AMERICAN): 42 ML/MIN/1.73 M^2
GLUCOSE SERPL-MCNC: 225 MG/DL (ref 70–110)
GLUCOSE SERPL-MCNC: 262 MG/DL (ref 70–110)
HCT VFR BLD AUTO: 25.2 % (ref 40–54)
HGB BLD-MCNC: 8.1 G/DL (ref 14–18)
INR PPP: 1.1 (ref 0.8–1.2)
LYMPHOCYTES # BLD AUTO: 1.3 K/UL (ref 1–4.8)
LYMPHOCYTES NFR BLD: 28.6 % (ref 18–48)
MAGNESIUM SERPL-MCNC: 1.6 MG/DL (ref 1.6–2.6)
MAGNESIUM SERPL-MCNC: 1.6 MG/DL (ref 1.6–2.6)
MCH RBC QN AUTO: 28.4 PG (ref 27–31)
MCHC RBC AUTO-ENTMCNC: 32.1 G/DL (ref 32–36)
MCV RBC AUTO: 88 FL (ref 82–98)
MONOCYTES # BLD AUTO: 0.4 K/UL (ref 0.3–1)
MONOCYTES NFR BLD: 8 % (ref 4–15)
NEUTROPHILS # BLD AUTO: 2.8 K/UL (ref 1.8–7.7)
NEUTROPHILS NFR BLD: 61.3 % (ref 38–73)
PHOSPHATE SERPL-MCNC: 3.6 MG/DL (ref 2.7–4.5)
PHOSPHATE SERPL-MCNC: 3.9 MG/DL (ref 2.7–4.5)
PLATELET # BLD AUTO: 272 K/UL (ref 150–350)
PMV BLD AUTO: 8.5 FL (ref 9.2–12.9)
POCT GLUCOSE: 230 MG/DL (ref 70–110)
POCT GLUCOSE: 244 MG/DL (ref 70–110)
POCT GLUCOSE: 260 MG/DL (ref 70–110)
POCT GLUCOSE: 294 MG/DL (ref 70–110)
POCT GLUCOSE: 314 MG/DL (ref 70–110)
POTASSIUM SERPL-SCNC: 3.7 MMOL/L (ref 3.5–5.1)
POTASSIUM SERPL-SCNC: 3.8 MMOL/L (ref 3.5–5.1)
PROT SERPL-MCNC: 4.4 G/DL (ref 6–8.4)
PROTHROMBIN TIME: 11.2 SEC (ref 9–12.5)
RBC # BLD AUTO: 2.85 M/UL (ref 4.6–6.2)
SODIUM SERPL-SCNC: 132 MMOL/L (ref 136–145)
SODIUM SERPL-SCNC: 134 MMOL/L (ref 136–145)
WBC # BLD AUTO: 4.62 K/UL (ref 3.9–12.7)

## 2019-06-29 PROCEDURE — 25000003 PHARM REV CODE 250

## 2019-06-29 PROCEDURE — 83735 ASSAY OF MAGNESIUM: CPT | Mod: 91

## 2019-06-29 PROCEDURE — 85025 COMPLETE CBC W/AUTO DIFF WBC: CPT

## 2019-06-29 PROCEDURE — 25000003 PHARM REV CODE 250: Performed by: STUDENT IN AN ORGANIZED HEALTH CARE EDUCATION/TRAINING PROGRAM

## 2019-06-29 PROCEDURE — 80048 BASIC METABOLIC PNL TOTAL CA: CPT

## 2019-06-29 PROCEDURE — 97110 THERAPEUTIC EXERCISES: CPT

## 2019-06-29 PROCEDURE — 84100 ASSAY OF PHOSPHORUS: CPT

## 2019-06-29 PROCEDURE — S0077 INJECTION, CLINDAMYCIN PHOSP: HCPCS

## 2019-06-29 PROCEDURE — S0171 BUMETANIDE 0.5 MG: HCPCS | Performed by: STUDENT IN AN ORGANIZED HEALTH CARE EDUCATION/TRAINING PROGRAM

## 2019-06-29 PROCEDURE — 85610 PROTHROMBIN TIME: CPT

## 2019-06-29 PROCEDURE — 63600175 PHARM REV CODE 636 W HCPCS: Performed by: STUDENT IN AN ORGANIZED HEALTH CARE EDUCATION/TRAINING PROGRAM

## 2019-06-29 PROCEDURE — S0028 INJECTION, FAMOTIDINE, 20 MG: HCPCS

## 2019-06-29 PROCEDURE — 97530 THERAPEUTIC ACTIVITIES: CPT

## 2019-06-29 PROCEDURE — 97166 OT EVAL MOD COMPLEX 45 MIN: CPT

## 2019-06-29 PROCEDURE — 11000001 HC ACUTE MED/SURG PRIVATE ROOM

## 2019-06-29 PROCEDURE — 63600175 PHARM REV CODE 636 W HCPCS

## 2019-06-29 PROCEDURE — 83735 ASSAY OF MAGNESIUM: CPT

## 2019-06-29 PROCEDURE — P9047 ALBUMIN (HUMAN), 25%, 50ML: HCPCS | Mod: JG | Performed by: STUDENT IN AN ORGANIZED HEALTH CARE EDUCATION/TRAINING PROGRAM

## 2019-06-29 PROCEDURE — 80053 COMPREHEN METABOLIC PANEL: CPT

## 2019-06-29 PROCEDURE — 94761 N-INVAS EAR/PLS OXIMETRY MLT: CPT

## 2019-06-29 PROCEDURE — 25000003 PHARM REV CODE 250: Performed by: SURGERY

## 2019-06-29 PROCEDURE — 84100 ASSAY OF PHOSPHORUS: CPT | Mod: 91

## 2019-06-29 RX ORDER — POTASSIUM CHLORIDE 7.45 MG/ML
10 INJECTION INTRAVENOUS
Status: COMPLETED | OUTPATIENT
Start: 2019-06-29 | End: 2019-06-29

## 2019-06-29 RX ORDER — ALBUMIN HUMAN 250 G/1000ML
12.5 SOLUTION INTRAVENOUS CONTINUOUS
Status: DISCONTINUED | OUTPATIENT
Start: 2019-06-29 | End: 2019-07-07

## 2019-06-29 RX ORDER — MAGNESIUM SULFATE 1 G/100ML
1 INJECTION INTRAVENOUS ONCE
Status: COMPLETED | OUTPATIENT
Start: 2019-06-29 | End: 2019-06-29

## 2019-06-29 RX ADMIN — ALBUMIN (HUMAN) 12.5 G: 12.5 SOLUTION INTRAVENOUS at 11:06

## 2019-06-29 RX ADMIN — CLINDAMYCIN IN 5 PERCENT DEXTROSE 600 MG: 12 INJECTION, SOLUTION INTRAVENOUS at 09:06

## 2019-06-29 RX ADMIN — PANCRELIPASE 1 CAPSULE: 24000; 76000; 120000 CAPSULE, DELAYED RELEASE PELLETS ORAL at 12:06

## 2019-06-29 RX ADMIN — FUROSEMIDE 80 MG: 40 TABLET ORAL at 08:06

## 2019-06-29 RX ADMIN — POTASSIUM CHLORIDE 10 MEQ: 10 INJECTION, SOLUTION INTRAVENOUS at 01:06

## 2019-06-29 RX ADMIN — PANCRELIPASE 2 CAPSULE: 30000; 6000; 19000 CAPSULE, DELAYED RELEASE PELLETS ORAL at 04:06

## 2019-06-29 RX ADMIN — INSULIN ASPART 1 UNITS: 100 INJECTION, SOLUTION INTRAVENOUS; SUBCUTANEOUS at 11:06

## 2019-06-29 RX ADMIN — ALBUMIN (HUMAN) 12.5 G: 12.5 SOLUTION INTRAVENOUS at 08:06

## 2019-06-29 RX ADMIN — ATORVASTATIN CALCIUM 40 MG: 40 TABLET, FILM COATED ORAL at 10:06

## 2019-06-29 RX ADMIN — POTASSIUM CHLORIDE 10 MEQ: 10 INJECTION, SOLUTION INTRAVENOUS at 12:06

## 2019-06-29 RX ADMIN — MAGNESIUM SULFATE 1 G: 1 INJECTION INTRAVENOUS at 02:06

## 2019-06-29 RX ADMIN — CLINDAMYCIN IN 5 PERCENT DEXTROSE 600 MG: 12 INJECTION, SOLUTION INTRAVENOUS at 06:06

## 2019-06-29 RX ADMIN — SPIRONOLACTONE 50 MG: 25 TABLET, FILM COATED ORAL at 08:06

## 2019-06-29 RX ADMIN — RAMELTEON 8 MG: 8 TABLET, FILM COATED ORAL at 09:06

## 2019-06-29 RX ADMIN — METOPROLOL SUCCINATE 50 MG: 25 TABLET, FILM COATED, EXTENDED RELEASE ORAL at 10:06

## 2019-06-29 RX ADMIN — APIXABAN 5 MG: 5 TABLET, FILM COATED ORAL at 12:06

## 2019-06-29 RX ADMIN — CLINDAMYCIN IN 5 PERCENT DEXTROSE 600 MG: 12 INJECTION, SOLUTION INTRAVENOUS at 02:06

## 2019-06-29 RX ADMIN — INSULIN ASPART 2 UNITS: 100 INJECTION, SOLUTION INTRAVENOUS; SUBCUTANEOUS at 06:06

## 2019-06-29 RX ADMIN — APIXABAN 5 MG: 5 TABLET, FILM COATED ORAL at 08:06

## 2019-06-29 RX ADMIN — FAMOTIDINE 20 MG: 10 INJECTION, SOLUTION INTRAVENOUS at 09:06

## 2019-06-29 RX ADMIN — BUMETANIDE: 0.25 INJECTION INTRAMUSCULAR; INTRAVENOUS at 12:06

## 2019-06-29 RX ADMIN — FAMOTIDINE 20 MG: 10 INJECTION, SOLUTION INTRAVENOUS at 08:06

## 2019-06-29 RX ADMIN — ACETYLCYSTEINE: 200 INJECTION, SOLUTION INTRAVENOUS at 08:06

## 2019-06-29 RX ADMIN — ALBUMIN (HUMAN) 12.5 G: 12.5 SOLUTION INTRAVENOUS at 12:06

## 2019-06-29 RX ADMIN — ACETYLCYSTEINE: 200 INJECTION, SOLUTION INTRAVENOUS at 10:06

## 2019-06-29 RX ADMIN — FUROSEMIDE 80 MG: 40 TABLET ORAL at 10:06

## 2019-06-29 RX ADMIN — INSULIN ASPART 3 UNITS: 100 INJECTION, SOLUTION INTRAVENOUS; SUBCUTANEOUS at 04:06

## 2019-06-29 RX ADMIN — PANCRELIPASE 1 CAPSULE: 24000; 76000; 120000 CAPSULE, DELAYED RELEASE PELLETS ORAL at 04:06

## 2019-06-29 RX ADMIN — ONDANSETRON 4 MG: 2 INJECTION INTRAMUSCULAR; INTRAVENOUS at 09:06

## 2019-06-29 RX ADMIN — SPIRONOLACTONE 50 MG: 25 TABLET, FILM COATED ORAL at 10:06

## 2019-06-29 RX ADMIN — TAMSULOSIN HYDROCHLORIDE 0.4 MG: 0.4 CAPSULE ORAL at 10:06

## 2019-06-29 RX ADMIN — TRAMADOL HYDROCHLORIDE 50 MG: 50 TABLET, FILM COATED ORAL at 09:06

## 2019-06-29 RX ADMIN — POTASSIUM CHLORIDE 10 MEQ: 10 INJECTION, SOLUTION INTRAVENOUS at 02:06

## 2019-06-29 RX ADMIN — PANCRELIPASE 2 CAPSULE: 30000; 6000; 19000 CAPSULE, DELAYED RELEASE PELLETS ORAL at 12:06

## 2019-06-29 RX ADMIN — DOPAMINE HYDROCHLORIDE IN DEXTROSE 2 MCG/KG/MIN: 1.6 INJECTION, SOLUTION INTRAVENOUS at 12:06

## 2019-06-29 NOTE — PLAN OF CARE
Problem: Physical Therapy Goal  Goal: Physical Therapy Goal  Goals to be met by: 2019    Patient will increase functional independence with mobility by performin. Sit to supine with Stand-by Assistance  2. Sit to stand transfer with Supervision  3. Bed to chair transfer with Stand-by Assistance using Rolling Walker  3. Gait  x20 feet with Contact Guard Assistance using Rolling Walker.   4. Lower extremity exercise program x12 reps per handout, with supervision     Outcome: Ongoing (interventions implemented as appropriate)  Pt tolerated tx session fairly well. Progressing fairly slow. Cont POC.

## 2019-06-29 NOTE — PT/OT/SLP PROGRESS
Physical Therapy Treatment    Patient Name:  Jian Arrieta   MRN:  [id number]    Recommendations:     Discharge Recommendations:  nursing facility, skilled   Discharge Equipment Recommendations: (Defer to SNF)   Barriers to discharge: Decreased caregiver support    Assessment:     Jian Arrieta is a 63 y.o. male admitted with a medical diagnosis of Portal hypertension.  He presents with the following impairments/functional limitations:  weakness, impaired self care skills, impaired balance, decreased coordination, decreased safety awareness, decreased ROM, impaired skin, impaired joint extensibility, impaired endurance, impaired functional mobilty, edema, impaired coordination, decreased lower extremity function, gait instability, impaired cognition, decreased upper extremity function, pain, impaired sensation. Pt concerned /c RLE appearance after PTA doffed pressure relief boots. Pt declined to sit at EOB until nurse Rachel present but agreeable to long-sitting therex in bed. PTA noted RLE appeared slightly redder than LLE /c indentation from pressure relief boots/edema but did not feel too hot nor warm to touch. Indentation decreased /c therex. Nurse Rachel then present to administer meds and observed RLE. Nurse informed pt RLE appears fine. Pt appears to lose focus and attention to therapy tasks throughout session requiring vc's for redirection to attend to task. Sup>sit /c Min A and sit>sup /c SBA. Performed activities per pt's pace & tolerance.    Rehab Prognosis: Fair; patient would benefit from acute skilled PT services to address these deficits and reach maximum level of function.    Recent Surgery: Procedure(s) (LRB):  ANGIOGRAM-PV STENT (N/A) 1 Day Post-Op    Plan:     During this hospitalization, patient to be seen 6 x/week to address the identified rehab impairments via gait training, therapeutic activities, therapeutic exercises and progress toward the following goals:    · Plan of Care Expires:   "07/28/19    Subjective     Chief Complaint: Nausea. I don't want them things on my legs.  Patient/Family Comments/goals: Pt states his LLE "looks good since in seven month" and RLE "not so much".  Pain/Comfort:  · Pain Rating 1: 4/10  · Location - Side 1: Bilateral  · Location - Orientation 1: lower  · Location 1: leg  · Pain Addressed 1: Distraction, Nurse notified  · Pain Rating Post-Intervention 1: (pt did not rate pain post tx session)  · Pain Rating 2: 4/10  · Location - Orientation 2: generalized  · Location 2: back  · Pain Addressed 2: Distraction, Reposition  · Pain Rating Post-Intervention 2: (pt did not rate post tx session)      Objective:     Communicated with nurse Ramos prior to session.  Patient found supine /c HOB elevated with bed alarm, pressure relief boots upon PT entry to room. Nurse Lev present to administer meds.    General Precautions: Standard, fall, deaf   Orthopedic Precautions:N/A   Braces: N/A     Functional Mobility:  · Bed Mobility:     · Scooting: minimum assistance and in supine to HOB /c pt using bedrails; PTA stabilizing RLE  · Supine to Sit: minimum assistance and HOB raised /c pt using bedrails; Min A /c RLE  · Sit to Supine: stand by assistance        AM-PAC 6 CLICK MOBILITY  Turning over in bed (including adjusting bedclothes, sheets and blankets)?: 3  Sitting down on and standing up from a chair with arms (e.g., wheelchair, bedside commode, etc.): 3  Moving from lying on back to sitting on the side of the bed?: 3  Moving to and from a bed to a chair (including a wheelchair)?: 3  Need to walk in hospital room?: 3  Climbing 3-5 steps with a railing?: 2  Basic Mobility Total Score: 17       Therapeutic Activities and Exercises:  Performed activities noted above. Pt declined to sit at EOB until nurse arrives to assess RLE.  BLE therex in long-sitting in bed: AROM 2 x 10 /c AP; AAROM x10 reps /c hip abd/add /c 2-3 sec hold at end range; nurse present; encouraged pt to sit at EOB. " Pt sat at EOB x10 min /c S. AAROM BLE therex at EOB: LAQ and hip flex /c RTB x10 reps; supine SLR x10 reps /c 3 sec hold at end range; pt loses focus and attention to tasks easily requiring vc's for redirection to tasks. Pt performed at his pace/speed & does not follow commands at times. Rest breaks required /c therex due to pain and fatigue. PTA instructed and educated pt importance of performing BLE's therex during his own time for ROM, strengthening, and endurance. Pt verb'd understanding. Post tx session, PTA positioned BLE's on pillows for comfort and to relieve pressure off heels.    Patient left supine /c HOB raised with call button in reach, bed alarm on, nurse Rachel notified and MD present..    GOALS:   Multidisciplinary Problems     Physical Therapy Goals     Not on file          Multidisciplinary Problems (Resolved)        Problem: Physical Therapy Goal    Goal Priority Disciplines Outcome Goal Variances Interventions   Physical Therapy Goal   (Resolved)     PT, PT/OT Outcome(s) achieved     Description:  Goals to be met by: 2019    Patient will increase functional independence with mobility by performin. Sit to supine with Stand-by Assistance  2. Sit to stand transfer with Supervision  3. Bed to chair transfer with Stand-by Assistance using Rolling Walker  3. Gait  x20 feet with Contact Guard Assistance using Rolling Walker.   4. Lower extremity exercise program x12 reps per handout, with supervision                      Time Tracking:     PT Received On: 19  PT Start Time: 0958     PT Stop Time: 1040  PT Total Time (min): 42 min     Billable Minutes: Therapeutic Activity 15 and Therapeutic Exercise 27    Treatment Type: Treatment  PT/PTA: PTA     PTA Visit Number: 1     Lorie Jiang PTA  2019

## 2019-06-29 NOTE — PLAN OF CARE
Problem: Adult Inpatient Plan of Care  Goal: Plan of Care Review  Outcome: Ongoing (interventions implemented as appropriate)  Patient Mr. Leavitt is AAO X 4. Vital signs stable. All due midicines given according to MAR. Remains on continuous Dopamine infusion. Voided frequently and good volumes of urine. On tele monitor shows Sinus tachycardia with PVC's. Slept fairly well during night. Denies pain at present. Will continue to monitor patient.

## 2019-06-29 NOTE — PLAN OF CARE
Cued into patient's room.  Permission received per patient to turn camera to view patient.  Introduced as VN for night shift that will be working with floor nurse and nursing assistant.  Linda RN and family member at bedside.  Educated patient on VN's role in patient care. Plan of care reviewed with patient. Education per flowsheet.  Opportunity given for questions and questions answered.  C/o lower back pain 7/10.  Instructed to call for assistance.  Will cont to monitor.

## 2019-06-29 NOTE — PLAN OF CARE
Problem: Adult Inpatient Plan of Care  Goal: Plan of Care Review  Outcome: Ongoing (interventions implemented as appropriate)  Pt on RA with documented sats.

## 2019-06-29 NOTE — PLAN OF CARE
VN rounds:  VN cued into pt's room with pt's permission.  Pt working with OT and unable to round at this time.  Will continue to be available and intervene as needed.

## 2019-06-29 NOTE — PROGRESS NOTES
Progress Note    Admit Date: 6/27/2019   LOS: 2 days     SUBJECTIVE:   Underwent paracentesis, transhepatic portal venogram, pressure measurements, and venoplasty with IR yesterday, tolerated well.  No new complaints this AM.  Feeling well.  No nausea or vomiting.  No fevers or chills.  Happy that LLE swelling is improving.      Scheduled Meds:   custom IVPB builder   Intravenous BID    apixaban  5 mg Oral BID    atorvastatin  40 mg Oral Daily    bumetanide  1 mg Intravenous Once    ceFAZolin (ANCEF) IVPB  2 g Intravenous 30 Min Pre-Op    clindamycin 600 MG/50 ML D5W  600 mg Intravenous Q8H    custom IV infusion builder   Intravenous Once    famotidine (PF)  20 mg Intravenous BID    furosemide  80 mg Oral BID    lidocaine (PF) 10 mg/ml (1%)  1 mL Other Once    lipase-protease-amylase 24,000-76,000-120,000 units  1 capsule Oral TID WM    And    lipase-protease-amylase 6,000-19,000-30,000 units  2 capsule Oral TID WM    magnesium sulfate IVPB  1 g Intravenous Once    mannitol 25%  12.5 g Intravenous Once    metoprolol succinate  50 mg Oral Daily    spironolactone  50 mg Oral BID    tamsulosin  1 capsule Oral Daily     Continuous Infusions:   albumin human 25% 12.5 g (06/29/19 1232)    bumex/mannitol infusion 7 mL/hr at 06/29/19 1232    DOPamine 2 mcg/kg/min (06/29/19 0017)     PRN Meds:acetaminophen, albuterol-ipratropium, Dextrose 10% Bolus, gelatin adsorbable 100cm top sponge, glucagon (human recombinant), insulin aspart U-100, lidocaine HCL 10 mg/ml (1%), ondansetron, ramelteon, senna-docusate 8.6-50 mg, sodium chloride 0.9%, traMADol, traMADol    Review of patient's allergies indicates:  No Known Allergies    OBJECTIVE:     Vital Signs (Most Recent)  Temp: 97.8 °F (36.6 °C) (06/29/19 1133)  Pulse: 110 (06/29/19 1158)  Resp: 18 (06/29/19 1133)  BP: (!) 96/57 (06/29/19 1133)  SpO2: 98 % (06/29/19 0500)    Vital Signs Range (Last 24H):  Temp:  [97.4 °F (36.3 °C)-98.2 °F (36.8 °C)]   Pulse:   [101-116]   Resp:  [12-22]   BP: ()/(50-71)   SpO2:  [98 %-100 %]     I & O (Last 24H):    Intake/Output Summary (Last 24 hours) at 6/29/2019 1531  Last data filed at 6/29/2019 0800  Gross per 24 hour   Intake 895.45 ml   Output 2215 ml   Net -1319.55 ml       Physical Exam:  NAD, AAO  Morbidly obese  RRR  Non-labored breathing, good respiratory effort  abd soft, obese, non-tender, no rebound, no guarding, no peritoneal signs  BLE lymphedema with some weeping noted to R-shin    LABS  CBC  Recent Labs   Lab 06/27/19 2213 06/28/19  0550 06/29/19  0627   WBC 3.83* 3.68* 4.62   RBC 2.67* 2.90* 2.85*   HGB 7.6* 8.2* 8.1*   HCT 23.5* 25.4* 25.2*    240 272   MCV 88 88 88   MCH 28.5 28.3 28.4   MCHC 32.3 32.3 32.1     CMP  Recent Labs   Lab 06/27/19  0457 06/27/19 2213 06/28/19  0550 06/29/19  0627   GLU 56* 183* 215* 225*   CALCIUM 7.4* 7.6* 7.7* 7.8*   ALBUMIN 1.7*  --  2.1* 2.0*   PROT 4.3*  --  4.6* 4.4*    136 134* 134*   K 3.5 4.0 3.7 3.7   CO2 26 26 28 27    104 102 100   BUN 45* 40* 35* 29*   CREATININE 1.7* 1.7* 1.2 1.7*   ALKPHOS 114  --  121 107   ALT 26  --  27 29   AST 27  --  27 25   BILITOT 0.3  --  0.4 0.3       Recent Labs   Lab 06/27/19 2213 06/28/19  0550 06/29/19  0627   CALCIUM 7.6* 7.7* 7.8*   MG 1.9 1.8 1.6   PHOS 3.7 3.0 3.6         POCT-Glucose  POCT Glucose   Date Value Ref Range Status   06/29/2019 244 (H) 70 - 110 mg/dL Final   06/29/2019 230 (H) 70 - 110 mg/dL Final   06/29/2019 314 (H) 70 - 110 mg/dL Final   06/28/2019 219 (H) 70 - 110 mg/dL Final   06/28/2019 296 (H) 70 - 110 mg/dL Final   06/28/2019 253 (H) 70 - 110 mg/dL Final   06/27/2019 181 (H) 70 - 110 mg/dL Final   06/27/2019 111 (H) 70 - 110 mg/dL Final   06/27/2019 228 (H) 70 - 110 mg/dL Final   06/26/2019 152 (H) 70 - 110 mg/dL Final   06/26/2019 233 (H) 70 - 110 mg/dL Final       [facility]  Recent Labs   Lab 06/25/19  1643 06/28/19  0550 06/29/19  0627   INR 1.1 1.1 1.1         Recent Labs   Lab  06/25/19  1643 06/27/19  2213   TROPONINI <0.006 0.006     BNP  Recent Labs   Lab 06/25/19  1643   *       ABGs  No results for input(s): PH, PCO2, PO2, HCO3, POCSATURATED, BE in the last 24 hours.    UA  Recent Labs   Lab 06/25/19  1734   COLORU Yellow   SPECGRAV 1.010   PHUR 5.0   PROTEINUA Negative   NITRITE Negative   LEUKOCYTESUR Negative       LAST HbA1c  Lab Results   Component Value Date    HGBA1C 7.1 (H) 05/23/2019       ASSESSMENT/PLAN:   63yoM with multiple comorbidities with extrahepatic PV occlusion plus mild cirrhosis with CRI and mild CHF contributing to recalcitrant ascites plus non-compliance with sodium/fluids as well as fatigue due to hypokalemia and fluid overload     -diabetic 2000cal diet, cardiac diet, 2g Na diet with <1L fluid restritiction  -IV clindamycin  -continue renal-dosed non-titrating dopamine 2mcg/kg/min  -continue 1200mg IV mucomyst BID  -replete electrolytes prn  -lasix and aldactone for diuresis  -strict I&Os  -wound care consult for weeping BLE lymphedema; elevate BLE  -SCDs bilat for DVT ppx  -Pepcid for GI ppx    Kojo Villalobos MD  PGY-3

## 2019-06-30 PROBLEM — I89.0 CHRONIC ACQUIRED LYMPHEDEMA: Status: ACTIVE | Noted: 2019-06-30

## 2019-06-30 LAB
ALBUMIN SERPL BCP-MCNC: 2.3 G/DL (ref 3.5–5.2)
ALP SERPL-CCNC: 87 U/L (ref 55–135)
ALT SERPL W/O P-5'-P-CCNC: 21 U/L (ref 10–44)
ANION GAP SERPL CALC-SCNC: 6 MMOL/L (ref 8–16)
AST SERPL-CCNC: 16 U/L (ref 10–40)
BACTERIA BLD CULT: NORMAL
BACTERIA BLD CULT: NORMAL
BASOPHILS # BLD AUTO: 0.02 K/UL (ref 0–0.2)
BASOPHILS NFR BLD: 0.5 % (ref 0–1.9)
BILIRUB SERPL-MCNC: 0.4 MG/DL (ref 0.1–1)
BUN SERPL-MCNC: 32 MG/DL (ref 8–23)
CALCIUM SERPL-MCNC: 7.8 MG/DL (ref 8.7–10.5)
CHLORIDE SERPL-SCNC: 99 MMOL/L (ref 95–110)
CO2 SERPL-SCNC: 28 MMOL/L (ref 23–29)
CREAT SERPL-MCNC: 2.3 MG/DL (ref 0.5–1.4)
DIFFERENTIAL METHOD: ABNORMAL
EOSINOPHIL # BLD AUTO: 0 K/UL (ref 0–0.5)
EOSINOPHIL NFR BLD: 1 % (ref 0–8)
ERYTHROCYTE [DISTWIDTH] IN BLOOD BY AUTOMATED COUNT: 14.4 % (ref 11.5–14.5)
EST. GFR  (AFRICAN AMERICAN): 33 ML/MIN/1.73 M^2
EST. GFR  (NON AFRICAN AMERICAN): 29 ML/MIN/1.73 M^2
GLUCOSE SERPL-MCNC: 200 MG/DL (ref 70–110)
HCT VFR BLD AUTO: 21.8 % (ref 40–54)
HGB BLD-MCNC: 7 G/DL (ref 14–18)
INR PPP: 1.2 (ref 0.8–1.2)
LYMPHOCYTES # BLD AUTO: 1.4 K/UL (ref 1–4.8)
LYMPHOCYTES NFR BLD: 34.4 % (ref 18–48)
MAGNESIUM SERPL-MCNC: 1.8 MG/DL (ref 1.6–2.6)
MCH RBC QN AUTO: 28.3 PG (ref 27–31)
MCHC RBC AUTO-ENTMCNC: 32.1 G/DL (ref 32–36)
MCV RBC AUTO: 88 FL (ref 82–98)
MONOCYTES # BLD AUTO: 0.3 K/UL (ref 0.3–1)
MONOCYTES NFR BLD: 7.7 % (ref 4–15)
NEUTROPHILS # BLD AUTO: 2.3 K/UL (ref 1.8–7.7)
NEUTROPHILS NFR BLD: 56.4 % (ref 38–73)
PHOSPHATE SERPL-MCNC: 3.8 MG/DL (ref 2.7–4.5)
PLATELET # BLD AUTO: 215 K/UL (ref 150–350)
PMV BLD AUTO: 8.6 FL (ref 9.2–12.9)
POCT GLUCOSE: 204 MG/DL (ref 70–110)
POCT GLUCOSE: 218 MG/DL (ref 70–110)
POCT GLUCOSE: 232 MG/DL (ref 70–110)
POCT GLUCOSE: 259 MG/DL (ref 70–110)
POTASSIUM SERPL-SCNC: 3.6 MMOL/L (ref 3.5–5.1)
PROT SERPL-MCNC: 4.2 G/DL (ref 6–8.4)
PROTHROMBIN TIME: 12.1 SEC (ref 9–12.5)
RBC # BLD AUTO: 2.47 M/UL (ref 4.6–6.2)
SODIUM SERPL-SCNC: 133 MMOL/L (ref 136–145)
WBC # BLD AUTO: 4.16 K/UL (ref 3.9–12.7)

## 2019-06-30 PROCEDURE — P9047 ALBUMIN (HUMAN), 25%, 50ML: HCPCS | Mod: JG | Performed by: STUDENT IN AN ORGANIZED HEALTH CARE EDUCATION/TRAINING PROGRAM

## 2019-06-30 PROCEDURE — 11000001 HC ACUTE MED/SURG PRIVATE ROOM

## 2019-06-30 PROCEDURE — 85610 PROTHROMBIN TIME: CPT

## 2019-06-30 PROCEDURE — S0077 INJECTION, CLINDAMYCIN PHOSP: HCPCS

## 2019-06-30 PROCEDURE — 25000003 PHARM REV CODE 250: Performed by: SURGERY

## 2019-06-30 PROCEDURE — 25000003 PHARM REV CODE 250

## 2019-06-30 PROCEDURE — 84100 ASSAY OF PHOSPHORUS: CPT

## 2019-06-30 PROCEDURE — 63600175 PHARM REV CODE 636 W HCPCS: Performed by: SURGERY

## 2019-06-30 PROCEDURE — S0028 INJECTION, FAMOTIDINE, 20 MG: HCPCS

## 2019-06-30 PROCEDURE — 94761 N-INVAS EAR/PLS OXIMETRY MLT: CPT

## 2019-06-30 PROCEDURE — S0171 BUMETANIDE 0.5 MG: HCPCS | Performed by: SURGERY

## 2019-06-30 PROCEDURE — 25000003 PHARM REV CODE 250: Performed by: STUDENT IN AN ORGANIZED HEALTH CARE EDUCATION/TRAINING PROGRAM

## 2019-06-30 PROCEDURE — 63600175 PHARM REV CODE 636 W HCPCS

## 2019-06-30 PROCEDURE — 80053 COMPREHEN METABOLIC PANEL: CPT

## 2019-06-30 PROCEDURE — 83735 ASSAY OF MAGNESIUM: CPT

## 2019-06-30 PROCEDURE — 85025 COMPLETE CBC W/AUTO DIFF WBC: CPT

## 2019-06-30 PROCEDURE — 63600175 PHARM REV CODE 636 W HCPCS: Mod: JG | Performed by: STUDENT IN AN ORGANIZED HEALTH CARE EDUCATION/TRAINING PROGRAM

## 2019-06-30 RX ORDER — HEPARIN SODIUM 5000 [USP'U]/ML
5000 INJECTION, SOLUTION INTRAVENOUS; SUBCUTANEOUS EVERY 8 HOURS
Status: DISCONTINUED | OUTPATIENT
Start: 2019-06-30 | End: 2019-07-11

## 2019-06-30 RX ORDER — POTASSIUM CHLORIDE 20 MEQ/1
40 TABLET, EXTENDED RELEASE ORAL 2 TIMES DAILY
Status: COMPLETED | OUTPATIENT
Start: 2019-06-30 | End: 2019-07-02

## 2019-06-30 RX ORDER — CYANOCOBALAMIN 1000 UG/ML
1000 INJECTION, SOLUTION INTRAMUSCULAR; SUBCUTANEOUS DAILY
Status: DISCONTINUED | OUTPATIENT
Start: 2019-06-30 | End: 2019-07-15 | Stop reason: HOSPADM

## 2019-06-30 RX ORDER — FUROSEMIDE 20 MG/1
20 TABLET ORAL 2 TIMES DAILY
Status: DISCONTINUED | OUTPATIENT
Start: 2019-06-30 | End: 2019-07-01

## 2019-06-30 RX ADMIN — PANCRELIPASE 2 CAPSULE: 30000; 6000; 19000 CAPSULE, DELAYED RELEASE PELLETS ORAL at 07:06

## 2019-06-30 RX ADMIN — POTASSIUM CHLORIDE 40 MEQ: 20 TABLET, EXTENDED RELEASE ORAL at 10:06

## 2019-06-30 RX ADMIN — TAMSULOSIN HYDROCHLORIDE 0.4 MG: 0.4 CAPSULE ORAL at 10:06

## 2019-06-30 RX ADMIN — ALBUMIN (HUMAN) 12.5 G: 12.5 SOLUTION INTRAVENOUS at 12:06

## 2019-06-30 RX ADMIN — SPIRONOLACTONE 50 MG: 25 TABLET, FILM COATED ORAL at 10:06

## 2019-06-30 RX ADMIN — HEPARIN SODIUM 5000 UNITS: 5000 INJECTION, SOLUTION INTRAVENOUS; SUBCUTANEOUS at 02:06

## 2019-06-30 RX ADMIN — FAMOTIDINE 20 MG: 10 INJECTION, SOLUTION INTRAVENOUS at 10:06

## 2019-06-30 RX ADMIN — CLINDAMYCIN IN 5 PERCENT DEXTROSE 600 MG: 12 INJECTION, SOLUTION INTRAVENOUS at 10:06

## 2019-06-30 RX ADMIN — EPOETIN ALFA-EPBX 11600 UNITS: 10000 INJECTION, SOLUTION INTRAVENOUS; SUBCUTANEOUS at 12:06

## 2019-06-30 RX ADMIN — CYANOCOBALAMIN 1000 MCG: 1000 INJECTION, SOLUTION INTRAMUSCULAR at 10:06

## 2019-06-30 RX ADMIN — ACETYLCYSTEINE: 200 INJECTION, SOLUTION INTRAVENOUS at 10:06

## 2019-06-30 RX ADMIN — CLINDAMYCIN IN 5 PERCENT DEXTROSE 600 MG: 12 INJECTION, SOLUTION INTRAVENOUS at 05:06

## 2019-06-30 RX ADMIN — PANCRELIPASE 1 CAPSULE: 24000; 76000; 120000 CAPSULE, DELAYED RELEASE PELLETS ORAL at 12:06

## 2019-06-30 RX ADMIN — ALBUMIN (HUMAN) 12.5 G: 12.5 SOLUTION INTRAVENOUS at 04:06

## 2019-06-30 RX ADMIN — CLINDAMYCIN IN 5 PERCENT DEXTROSE 600 MG: 12 INJECTION, SOLUTION INTRAVENOUS at 02:06

## 2019-06-30 RX ADMIN — BUMETANIDE: 0.25 INJECTION INTRAMUSCULAR; INTRAVENOUS at 07:06

## 2019-06-30 RX ADMIN — PANCRELIPASE 2 CAPSULE: 30000; 6000; 19000 CAPSULE, DELAYED RELEASE PELLETS ORAL at 05:06

## 2019-06-30 RX ADMIN — PANCRELIPASE 2 CAPSULE: 30000; 6000; 19000 CAPSULE, DELAYED RELEASE PELLETS ORAL at 12:06

## 2019-06-30 RX ADMIN — PANCRELIPASE 1 CAPSULE: 24000; 76000; 120000 CAPSULE, DELAYED RELEASE PELLETS ORAL at 05:06

## 2019-06-30 RX ADMIN — INSULIN ASPART 3 UNITS: 100 INJECTION, SOLUTION INTRAVENOUS; SUBCUTANEOUS at 12:06

## 2019-06-30 RX ADMIN — FUROSEMIDE 20 MG: 20 TABLET ORAL at 10:06

## 2019-06-30 RX ADMIN — METOPROLOL SUCCINATE 50 MG: 25 TABLET, FILM COATED, EXTENDED RELEASE ORAL at 10:06

## 2019-06-30 RX ADMIN — ALBUMIN (HUMAN) 12.5 G: 12.5 SOLUTION INTRAVENOUS at 05:06

## 2019-06-30 RX ADMIN — HEPARIN SODIUM 5000 UNITS: 5000 INJECTION, SOLUTION INTRAVENOUS; SUBCUTANEOUS at 10:06

## 2019-06-30 RX ADMIN — RAMELTEON 8 MG: 8 TABLET, FILM COATED ORAL at 10:06

## 2019-06-30 RX ADMIN — INSULIN ASPART 2 UNITS: 100 INJECTION, SOLUTION INTRAVENOUS; SUBCUTANEOUS at 06:06

## 2019-06-30 RX ADMIN — IRON SUCROSE 100 MG: 20 INJECTION, SOLUTION INTRAVENOUS at 10:06

## 2019-06-30 RX ADMIN — INSULIN ASPART 2 UNITS: 100 INJECTION, SOLUTION INTRAVENOUS; SUBCUTANEOUS at 05:06

## 2019-06-30 RX ADMIN — ATORVASTATIN CALCIUM 40 MG: 40 TABLET, FILM COATED ORAL at 10:06

## 2019-06-30 RX ADMIN — PANCRELIPASE 1 CAPSULE: 24000; 76000; 120000 CAPSULE, DELAYED RELEASE PELLETS ORAL at 07:06

## 2019-06-30 NOTE — PLAN OF CARE
Problem: Adult Inpatient Plan of Care  Goal: Plan of Care Review  Outcome: Ongoing (interventions implemented as appropriate)  Pt remains A+Ox4. IV bumex, albumin and dopamine infusing per MAR with rate changes listed. Pt denied pain, nausea this shift. Tolerating diet well. Blood glucose levels monitored, insulin administered as needed per MAR. Safety precautions maintained. Will continue to monitor and intervene as needed.

## 2019-06-30 NOTE — PROGRESS NOTES
Progress Note    Admit Date: 6/27/2019   LOS: 3 days     SUBJECTIVE:   NAEON.  Feeling well this AM.  Tolerating PO intake.  No nausea or vomiting. No fevers or chills.       Scheduled Meds:   custom IVPB builder   Intravenous BID    apixaban  5 mg Oral BID    atorvastatin  40 mg Oral Daily    bumetanide  1 mg Intravenous Once    ceFAZolin (ANCEF) IVPB  2 g Intravenous 30 Min Pre-Op    clindamycin 600 MG/50 ML D5W  600 mg Intravenous Q8H    custom IV infusion builder   Intravenous Once    famotidine (PF)  20 mg Intravenous BID    furosemide  80 mg Oral BID    lidocaine (PF) 10 mg/ml (1%)  1 mL Other Once    lipase-protease-amylase 24,000-76,000-120,000 units  1 capsule Oral TID WM    And    lipase-protease-amylase 6,000-19,000-30,000 units  2 capsule Oral TID WM    mannitol 25%  12.5 g Intravenous Once    metoprolol succinate  50 mg Oral Daily    spironolactone  50 mg Oral BID    tamsulosin  1 capsule Oral Daily     Continuous Infusions:   albumin human 25% 10 mL/hr at 06/30/19 0600    bumex/mannitol infusion 7 mL/hr at 06/29/19 2000    DOPamine 2 mcg/kg/min (06/30/19 0600)     PRN Meds:acetaminophen, albuterol-ipratropium, Dextrose 10% Bolus, gelatin adsorbable 100cm top sponge, glucagon (human recombinant), insulin aspart U-100, lidocaine HCL 10 mg/ml (1%), ondansetron, ramelteon, senna-docusate 8.6-50 mg, sodium chloride 0.9%, traMADol, traMADol    Review of patient's allergies indicates:  No Known Allergies    OBJECTIVE:     Vital Signs (Most Recent)  Temp: 98 °F (36.7 °C) (06/30/19 0719)  Pulse: 99 (06/30/19 0719)  Resp: 20 (06/30/19 0719)  BP: (!) 100/57 (06/30/19 0719)  SpO2: 96 % (06/30/19 0836)    Vital Signs Range (Last 24H):  Temp:  [97.8 °F (36.6 °C)-98.5 °F (36.9 °C)]   Pulse:  []   Resp:  [16-20]   BP: ()/(44-64)   SpO2:  [96 %-97 %]     I & O (Last 24H):    Intake/Output Summary (Last 24 hours) at 6/30/2019 0932  Last data filed at 6/30/2019 0800  Gross per 24 hour    Intake 960.72 ml   Output 1250 ml   Net -289.28 ml     Physical Exam:  NAD, AAO  Morbidly obese  RRR  Non-labored breathing, good respiratory effort  abd soft, obese, non-tender, no rebound, no guarding, no peritoneal signs  BLE lymphedema with some weeping noted to R-shin     LABS  CBC  Recent Labs   Lab 06/28/19  0550 06/29/19  0627 06/30/19  0426   WBC 3.68* 4.62 4.16   RBC 2.90* 2.85* 2.47*   HGB 8.2* 8.1* 7.0*   HCT 25.4* 25.2* 21.8*    272 215   MCV 88 88 88   MCH 28.3 28.4 28.3   MCHC 32.3 32.1 32.1     CMP  Recent Labs   Lab 06/28/19  0550 06/29/19  0627 06/29/19  1611 06/30/19  0426   * 225* 262* 200*   CALCIUM 7.7* 7.8* 7.6* 7.8*   ALBUMIN 2.1* 2.0*  --  2.3*   PROT 4.6* 4.4*  --  4.2*   * 134* 132* 133*   K 3.7 3.7 3.8 3.6   CO2 28 27 28 28    100 99 99   BUN 35* 29* 31* 32*   CREATININE 1.2 1.7* 2.0* 2.3*   ALKPHOS 121 107  --  87   ALT 27 29  --  21   AST 27 25  --  16   BILITOT 0.4 0.3  --  0.4       Recent Labs   Lab 06/29/19  0627 06/29/19  1611 06/30/19  0426   CALCIUM 7.8* 7.6* 7.8*   MG 1.6 1.6 1.8   PHOS 3.6 3.9 3.8         POCT-Glucose  POCT Glucose   Date Value Ref Range Status   06/30/2019 218 (H) 70 - 110 mg/dL Final   06/29/2019 260 (H) 70 - 110 mg/dL Final   06/29/2019 294 (H) 70 - 110 mg/dL Final   06/29/2019 244 (H) 70 - 110 mg/dL Final   06/29/2019 230 (H) 70 - 110 mg/dL Final   06/29/2019 314 (H) 70 - 110 mg/dL Final   06/28/2019 219 (H) 70 - 110 mg/dL Final   06/28/2019 296 (H) 70 - 110 mg/dL Final   06/28/2019 253 (H) 70 - 110 mg/dL Final   06/27/2019 181 (H) 70 - 110 mg/dL Final   06/27/2019 111 (H) 70 - 110 mg/dL Final       COAGS  Recent Labs   Lab 06/28/19  0550 06/29/19  0627 06/30/19  0426   INR 1.1 1.1 1.2       CE  Recent Labs   Lab 06/25/19  1643 06/27/19  2213   TROPONINI <0.006 0.006     BNP  Recent Labs   Lab 06/25/19  1643   *       ABGs  No results for input(s): PH, PCO2, PO2, HCO3, POCSATURATED, BE in the last 24 hours.    UA  Recent  Labs   Lab 06/25/19  1734   COLORU Yellow   SPECGRAV 1.010   PHUR 5.0   PROTEINUA Negative   NITRITE Negative   LEUKOCYTESUR Negative       LAST HbA1c  Lab Results   Component Value Date    HGBA1C 7.1 (H) 05/23/2019       ASSESSMENT/PLAN:   63yoM with multiple comorbidities with extrahepatic PV occlusion plus mild cirrhosis with CRI and mild CHF contributing to recalcitrant ascites plus non-compliance with sodium/fluids as well as fatigue due to hypokalemia and fluid overload, now s/p paracentesis, transhepatic portal venogram, pressure measurements, and venoplasty with IR 6/28/19.       -diabetic 2000cal diet, cardiac diet, 2g Na diet with <1L fluid restritiction  -IV clindamycin, transition to PO prior to discharge  -continue renal-dosed non-titrating dopamine 2mcg/kg/min  -continue 1200mg IV mucomyst BID  -replete electrolytes prn  -lasix and aldactone for diuresis  -strict I&Os  -wound care consult for weeping BLE lymphedema; elevate BLE  -SCDs bilat for DVT ppx  -Pepcid for GI ppx    Kojo Villalobos MD  PGY-3

## 2019-06-30 NOTE — PLAN OF CARE
VN rounds:  VN cued into pt's room with pt's permission.  Pt resting in bed with call bell near and bed in low position.  Fall risk protocol discussed with pt.  VN instructed to call for assistance.  Pt aware and agreeable.  Pt in good spirits, denies, pain, anxiety, nausea or dyspnea.  No acute distress noted.  Allowed time for questions.  Will continue to be available and intervene as needed.

## 2019-06-30 NOTE — PLAN OF CARE
Problem: Occupational Therapy Goal  Goal: Occupational Therapy Goal  Goals to be met by: 7/29/2019    Patient will increase functional independence with ADLs by performing:    UE Dressing with Modified Lane.  LE Dressing with Modified Lane.  Toileting from toilet with Modified Lane for hygiene and clothing management.   Supine to sit with Modified [address].  Step transfer with Modified Lane  Toilet transfer to toilet with Modified Lane.  Increased functional strength to WFL for ADLS.  Upper extremity exercise program 10 reps per handout, with supervision.    Outcome: Ongoing (interventions implemented as appropriate)  OT initial eval completed and treatment initiated. Pt. would benefit from SNF. Continue with OT POC.

## 2019-06-30 NOTE — PT/OT/SLP EVAL
Occupational Therapy   Evaluation/Treatment    Name: Jian Arrieta  MRN: [id number]  Admitting Diagnosis:  Portal hypertension 1 Day Post-Op.   s/p Procedure(s):  ANGIOGRAM-PV STENT   The primary encounter diagnosis was Other ascites. Diagnoses of Portal hypertension, Chest pain, Anasarca, Obesity hypoventilation syndrome, Stage 3 chronic kidney disease, and Venous stasis dermatitis of both lower extremities were also pertinent to this visit.    Recommendations:     Discharge Recommendations: nursing facility, skilled  Discharge Equipment Recommendations:  (TBD)  Barriers to discharge:  Decreased caregiver support, Inaccessible home environment    Assessment:     Jian Arrieta is a 63 y.o. male with a medical diagnosis of Portal hypertension.  He presents with debility and LE lymphedema.  He presents with decline in ADL and fx mobility independence.  He would benefit from SNF.  Continue with OT POC. Performance deficits affecting function: weakness, impaired self care skills, impaired balance, decreased safety awareness, pain, decreased lower extremity function, gait instability, impaired functional mobilty, impaired endurance, edema, impaired skin.      Rehab Prognosis: Good; patient would benefit from acute skilled OT services to address these deficits and reach maximum level of function.       Plan:     Patient to be seen 5 x/week to address the above listed problems via self-care/home management, therapeutic activities, therapeutic exercises  · Plan of Care Expires: 07/29/19  · Plan of Care Reviewed with: patient, spouse, other (see comments)    Subjective     Chief Complaint: pain in back  Patient/Family Comments/goals: none    Occupational Profile:  Living Environment: with wife but pt lives in garage apartment because he has steps to his house and has difficulty and wife lives in the main house with 3 teps and 2 rails ; has full bath in garage with tub/shower and has access to walk n shower.  Previous level of  function: pt assisted with tub transfers by his wife; he is Mary Carmen with dressing but wears velcro slipper shoes for LE edema; he is alone at home when wife works; he eats dinner in main house and wife prepares meals.  Ambulates with [place] with Rw.   Roles and Routines: sedentary.  Equipment Used at Home:  shower chair, rollator, walker, rolling, cane, straight, power chair, hospital bed(has BSC but was not using; has lift chair)  Assistance upon Discharge: friend and wife but limited 2/2 wife works.    Pain/Comfort:  · Pain Rating 1: 8/10  · Location - Side 1: Bilateral  · Location - Orientation 1: generalized  · Location 1: back  · Pain Addressed 1: Distraction, Reposition, Cessation of Activity  · Pain Rating Post-Intervention 1: (less pain but not rated)    Patients cultural, spiritual, Latter-day conflicts given the current situation: no    Objective:     Communicated with: nurse prior to session.  Patient found HOB elevated with bed alarm upon OT entry to room.    General Precautions: Standard, fall, hearing impaired(left ear deaf and right ear [place])   Orthopedic Precautions:N/A   Braces: N/A     Occupational Performance:    Bed Mobility:    · Patient completed Rolling/Turning to Right with stand by assistance, with side rail and HOB elevated  · Patient completed Scooting/Bridging with stand by assistance  · Patient completed Supine to Sit with stand by assistance, with side rail and HOB elevated  · Patient completed Sit to Supine with minimum assistance and for legs    Functional Mobility/Transfers:  · Patient completed Sit <> Stand Transfer with moderate assistance  with  rolling walker   · Functional Mobility: side stepped with min assist using RW.    Activities of Daily Living:  · Feeding:  independence HOB elevated  · Grooming: independence bed level  · Lower Body Dressing: moderate assistance for L velcro shoe 2/2 limited by leg pain unable to bend knee in long sitting in bed; donned R shoe in long sitting in  bed with knee bent to don velcro slipper   · Toileting: setup urinal bed level    Cognitive/Visual Perceptual:  Cognitive/Psychosocial Skills:     -       Oriented to: Person, Place, Time and Situation   -       Follows Commands/attention:Inattentive, Easily distracted and Follows one-step commands  -       Communication: clear/fluent  -       Memory: Impaired STM, Impaired LTM and Poor immediate recall  -       Safety awareness/insight to disability: impaired   -       Mood/Affect/Coping skills/emotional control: Cooperative and Attention Seeking  Visual/Perceptual:      -Intact .    Physical Exam:  Balance:    -       sitting:  good  dynamic: fair plus  standing:  fair   dynamic:  poor   Postural examination/scapula alignment:    -       Rounded shoulders  Skin integrity: redness left lower leg  Edema:  Mild BLE and Moderate same  Dominant hand:    -       left  Upper Extremity Range of Motion:     -       Right Upper Extremity: Deficits: 4-/5  -       Left Upper Extremity: Deficits: 4-/5  Upper Extremity Strength:    -       Right Upper Extremity: Deficits: 4-/5  -       Left Upper Extremity: Deficits: 4-/5   Strength:    -       Right Upper Extremity: WFL  -       Left Upper Extremity: WFL    AMPAC 6 Click ADL:  AMPAC Total Score: 18    Treatment & Education:  Role of oT and POC  Sitting endurance EOB initially c.o dizziness but sat for 15 min with Supervision.  Able to stand with min assist to Rw x 2 trials. Pt. side stepped with RW and min assist; mod vc to redirect 2/2 tangenial conversation, easily distracted and making jokes.  Wife present and redirected him but he got frustrated with her interference.    Education:    Patient left HOB elevated with all lines intact, call button in reach, bed alarm on, nurse notified and wife and family friend present    GOALS:   Multidisciplinary Problems     Occupational Therapy Goals        Problem: Occupational Therapy Goal    Goal Priority Disciplines Outcome  Interventions   Occupational Therapy Goal     OT, PT/OT Ongoing (interventions implemented as appropriate)    Description:  Goals to be met by: 7/29/2019    Patient will increase functional independence with ADLs by performing:    UE Dressing with Modified [place].  LE Dressing with Modified [place].  Toileting from toilet with Modified [place] for hygiene and clothing management.   Supine to sit with Modified [place].  Step transfer with Modified [place]  Toilet transfer to toilet with Modified [place].  Increased functional strength to WFL for ADLS.  Upper extremity exercise program 10 reps per handout, with supervision.                      History:     Past Medical History:   Diagnosis Date    Alcohol abuse     Anasarca 1/28/2019    Arthropathy associated with neurological disorder 9/2/2015    Atherosclerosis     Charcot foot due to diabetes mellitus     Chronic combined systolic and diastolic heart failure 01/29/2019 1-28-19 Left VentricleModerate decreased ejection fraction at 30%. Normal left ventricular cavity size. Normal wall thickness observed. Grade I (mild) left ventricular diastolic dysfunction consistent with impaired relaxation. Normal left atrial pressure. Moderate, global hypokinesis(see wall scoring diagram). Right VentricleNormal cavity size, wall thickness and ejection fraction. Wall motion n    Chronic pancreatitis 1/28/2019    Colon polyps     approx 5 yrs ago    Coronary artery disease due to calcified coronary lesion 05/08/2015    5 stents on ASA      Diabetic polyneuropathy associated with type 2 diabetes mellitus 9/2/2015    Diverticulosis 1/28/2019    DM type 2 with diabetic peripheral neuropathy 2/4/2019    Encounter for blood transfusion     Essential hypertension 1/28/2019    Former smoker 8/26/2015    Healed ulcer of left foot on examination 6/20/2017    Hydrocele     approx 1.5 yrs ago    Hypoalbuminemia 2/4/2019    Lymphedema of  both lower extremities 1/29/2019    Mixed hyperlipidemia 5/8/2015    Morbid obesity with BMI of 50.0-59.9, adult 5/8/2015    Onychomycosis of multiple toenails with type 2 diabetes mellitus and peripheral neuropathy 6/20/2017    Perianal cyst     approx 2 yrs ago    Pseudocyst of pancreas 1/28/2019 1-28-19 Liver has a cirrhotic morphology with no focal lesions.  Significant interval increase in ascites when compared to prior exam which may account for patient's abdominal distension.  Hypodense air-fluid collection along the body of the pancreas which is slightly smaller when compared to prior CT.  Findings may relate to pancreatic necrosis with pancreatic pseudocysts with infected pseudocyst    Skin cancer     skin cancer    Sleep apnea 8/26/2015    Status post bariatric surgery 1/11/2016    Type 2 diabetes mellitus, with long-term current use of insulin 5/8/2015       Past Surgical History:   Procedure Laterality Date    ANGIOGRAM, CORONARY ARTERY Right 3/20/2019    Performed by [facility]que [facility] at [facility] CATH LAB    ANGIOPLASTY      total x5 stents    Bypass graft study  3/20/2019    Performed by [facility]que [facility] at [facility] CATH LAB    COLONOSCOPY N/A 10/6/2015    Performed by Shekhar Richards MD at [facility] ENDO (2ND FLR)    CORONARY ARTERY BYPASS GRAFT  2017    x3    CYST REMOVAL      GASTRECTOMY      GASTRECTOMY-SLEEVE-LAPAROSCOPIC - 02118 N/A 12/22/2015    Performed by Micheal Villavicencio Jr., MD at [facility] OR 2ND FLR    KNEE ARTHROSCOPY      perianal surgery      perianal cyst removed    pleurx N/A 5/17/2019    Performed by [facility] at [facility] OR 2ND FLR       Time Tracking:     OT Date of Treatment: 06/29/19  OT Start Time: 1319  OT Stop Time: 1357  OT Total Time (min): 38 min    Billable Minutes:Evaluation 15  Therapeutic Activity 23  Total Time 38    An Loyd OT  6/29/2019

## 2019-06-30 NOTE — PLAN OF CARE
Problem: Adult Inpatient Plan of Care  Goal: Plan of Care Review  Outcome: Ongoing (interventions implemented as appropriate)  Pt AAO x 4. VSS. NAD. Cardiac and glucose monitored, supplemental insulin provided. Bumex, albumin and dopamine drip infusing continuously per mar. Supplemental potassium provided per dr orders. No complaints of pain, nausea controlled with prn meds. Urinal within reach, up to bedside commode with 2 person assist once this shift. Safety maintained. Will continue to monitor.

## 2019-06-30 NOTE — PLAN OF CARE
Problem: Adult Inpatient Plan of Care  Goal: Absence of Hospital-Acquired Illness or Injury  Outcome: Ongoing (interventions implemented as appropriate)  Intervention: Identify and Manage Fall Risk     06/30/19 0419   Optimize Balance and Safe Activity   Safety Promotion/Fall Prevention assistive device/personal item within reach;bed alarm set;crib side rails raised x2;Fall Risk reviewed with patient/family;Fall Risk signage in place;lighting adjusted;medications reviewed;room near unit station;side rails raised x 3;supervised activity;toileting scheduled     Intervention: Prevent VTE (venous thromboembolism)     06/30/19 0419   Prevent or Manage Embolism   VTE Prevention/Management bleeding risk factor(s) identified, provider notified;remove, assess skin and reapply compression stockings;dorsiflexion/plantar flexion performed;ROM (active) performed       Goal: Optimal Comfort and Wellbeing  Outcome: Ongoing (interventions implemented as appropriate)  Intervention: Provide Person-Centered Care     06/30/19 0419   Support Dyspnea Relief   Trust Relationship/Rapport care explained;choices provided;emotional support provided;empathic listening provided;questions answered;questions encouraged;reassurance provided;thoughts/feelings acknowledged         Comments: Monitored for ss of overload. Breath sounds unchanged , vitals stable. No tachydysrrhythmias. sysBP 90 overnight with MAP greater than 65. NAD, pt denies cp and increased work of breathing. No change in mental status. Nausea with vomitting x1 , yellow emesis approx 50ml. No signs of bleeding . Dressing is cdi. Voiding has been wnl . Lymphedema in ble and scrotal edema unchanged overnight. Ascites unchanged but pt does difficulty breathing.

## 2019-07-01 LAB
ALBUMIN SERPL BCP-MCNC: 2.5 G/DL (ref 3.5–5.2)
ALP SERPL-CCNC: 80 U/L (ref 55–135)
ALT SERPL W/O P-5'-P-CCNC: 16 U/L (ref 10–44)
AMYLASE SERPL-CCNC: 42 U/L (ref 20–110)
ANION GAP SERPL CALC-SCNC: 7 MMOL/L (ref 8–16)
AST SERPL-CCNC: 16 U/L (ref 10–40)
BASOPHILS # BLD AUTO: 0.01 K/UL (ref 0–0.2)
BASOPHILS NFR BLD: 0.2 % (ref 0–1.9)
BILIRUB SERPL-MCNC: 0.4 MG/DL (ref 0.1–1)
BUN SERPL-MCNC: 37 MG/DL (ref 8–23)
CALCIUM SERPL-MCNC: 8 MG/DL (ref 8.7–10.5)
CHLORIDE SERPL-SCNC: 101 MMOL/L (ref 95–110)
CO2 SERPL-SCNC: 27 MMOL/L (ref 23–29)
CREAT SERPL-MCNC: 3 MG/DL (ref 0.5–1.4)
DIFFERENTIAL METHOD: ABNORMAL
EOSINOPHIL # BLD AUTO: 0.1 K/UL (ref 0–0.5)
EOSINOPHIL NFR BLD: 2.1 % (ref 0–8)
ERYTHROCYTE [DISTWIDTH] IN BLOOD BY AUTOMATED COUNT: 14.2 % (ref 11.5–14.5)
EST. GFR  (AFRICAN AMERICAN): 24 ML/MIN/1.73 M^2
EST. GFR  (NON AFRICAN AMERICAN): 21 ML/MIN/1.73 M^2
GLUCOSE SERPL-MCNC: 175 MG/DL (ref 70–110)
HCT VFR BLD AUTO: 22.4 % (ref 40–54)
HGB BLD-MCNC: 7.2 G/DL (ref 14–18)
INR PPP: 1.2 (ref 0.8–1.2)
LIPASE SERPL-CCNC: 10 U/L (ref 4–60)
LYMPHOCYTES # BLD AUTO: 1.6 K/UL (ref 1–4.8)
LYMPHOCYTES NFR BLD: 31.3 % (ref 18–48)
MAGNESIUM SERPL-MCNC: 1.8 MG/DL (ref 1.6–2.6)
MCH RBC QN AUTO: 28.3 PG (ref 27–31)
MCHC RBC AUTO-ENTMCNC: 32.1 G/DL (ref 32–36)
MCV RBC AUTO: 88 FL (ref 82–98)
MONOCYTES # BLD AUTO: 0.4 K/UL (ref 0.3–1)
MONOCYTES NFR BLD: 8.1 % (ref 4–15)
NEUTROPHILS # BLD AUTO: 3 K/UL (ref 1.8–7.7)
NEUTROPHILS NFR BLD: 58.3 % (ref 38–73)
PHOSPHATE SERPL-MCNC: 4.5 MG/DL (ref 2.7–4.5)
PLATELET # BLD AUTO: 228 K/UL (ref 150–350)
PMV BLD AUTO: 8.7 FL (ref 9.2–12.9)
POCT GLUCOSE: 200 MG/DL (ref 70–110)
POCT GLUCOSE: 233 MG/DL (ref 70–110)
POCT GLUCOSE: 300 MG/DL (ref 70–110)
POTASSIUM SERPL-SCNC: 4.1 MMOL/L (ref 3.5–5.1)
PROT SERPL-MCNC: 4.2 G/DL (ref 6–8.4)
PROTHROMBIN TIME: 12.1 SEC (ref 9–12.5)
RBC # BLD AUTO: 2.54 M/UL (ref 4.6–6.2)
SODIUM SERPL-SCNC: 135 MMOL/L (ref 136–145)
TROPONIN I SERPL DL<=0.01 NG/ML-MCNC: <0.006 NG/ML (ref 0–0.03)
WBC # BLD AUTO: 5.18 K/UL (ref 3.9–12.7)

## 2019-07-01 PROCEDURE — 94761 N-INVAS EAR/PLS OXIMETRY MLT: CPT

## 2019-07-01 PROCEDURE — S0028 INJECTION, FAMOTIDINE, 20 MG: HCPCS

## 2019-07-01 PROCEDURE — 25000003 PHARM REV CODE 250: Performed by: STUDENT IN AN ORGANIZED HEALTH CARE EDUCATION/TRAINING PROGRAM

## 2019-07-01 PROCEDURE — 85025 COMPLETE CBC W/AUTO DIFF WBC: CPT

## 2019-07-01 PROCEDURE — 63600175 PHARM REV CODE 636 W HCPCS: Performed by: STUDENT IN AN ORGANIZED HEALTH CARE EDUCATION/TRAINING PROGRAM

## 2019-07-01 PROCEDURE — 11000001 HC ACUTE MED/SURG PRIVATE ROOM

## 2019-07-01 PROCEDURE — 83735 ASSAY OF MAGNESIUM: CPT

## 2019-07-01 PROCEDURE — 84100 ASSAY OF PHOSPHORUS: CPT

## 2019-07-01 PROCEDURE — 63600175 PHARM REV CODE 636 W HCPCS: Performed by: SURGERY

## 2019-07-01 PROCEDURE — 80053 COMPREHEN METABOLIC PANEL: CPT

## 2019-07-01 PROCEDURE — 25000003 PHARM REV CODE 250

## 2019-07-01 PROCEDURE — 83690 ASSAY OF LIPASE: CPT

## 2019-07-01 PROCEDURE — 25000003 PHARM REV CODE 250: Performed by: SURGERY

## 2019-07-01 PROCEDURE — 85610 PROTHROMBIN TIME: CPT

## 2019-07-01 PROCEDURE — 97530 THERAPEUTIC ACTIVITIES: CPT

## 2019-07-01 PROCEDURE — 63600175 PHARM REV CODE 636 W HCPCS

## 2019-07-01 PROCEDURE — P9047 ALBUMIN (HUMAN), 25%, 50ML: HCPCS | Mod: JG | Performed by: STUDENT IN AN ORGANIZED HEALTH CARE EDUCATION/TRAINING PROGRAM

## 2019-07-01 PROCEDURE — 82150 ASSAY OF AMYLASE: CPT

## 2019-07-01 PROCEDURE — S0077 INJECTION, CLINDAMYCIN PHOSP: HCPCS

## 2019-07-01 PROCEDURE — 97116 GAIT TRAINING THERAPY: CPT

## 2019-07-01 PROCEDURE — 36415 COLL VENOUS BLD VENIPUNCTURE: CPT

## 2019-07-01 PROCEDURE — S5571 INSULIN DISPOS PEN 3 ML: HCPCS | Performed by: STUDENT IN AN ORGANIZED HEALTH CARE EDUCATION/TRAINING PROGRAM

## 2019-07-01 PROCEDURE — 84484 ASSAY OF TROPONIN QUANT: CPT

## 2019-07-01 RX ORDER — INSULIN ASPART 100 [IU]/ML
4 INJECTION, SOLUTION INTRAVENOUS; SUBCUTANEOUS
Status: DISCONTINUED | OUTPATIENT
Start: 2019-07-01 | End: 2019-07-15 | Stop reason: HOSPADM

## 2019-07-01 RX ADMIN — INSULIN ASPART 4 UNITS: 100 INJECTION, SOLUTION INTRAVENOUS; SUBCUTANEOUS at 12:07

## 2019-07-01 RX ADMIN — PANCRELIPASE 2 CAPSULE: 30000; 6000; 19000 CAPSULE, DELAYED RELEASE PELLETS ORAL at 09:07

## 2019-07-01 RX ADMIN — ACETYLCYSTEINE: 200 INJECTION, SOLUTION INTRAVENOUS at 09:07

## 2019-07-01 RX ADMIN — EPOETIN ALFA-EPBX 11600 UNITS: 10000 INJECTION, SOLUTION INTRAVENOUS; SUBCUTANEOUS at 02:07

## 2019-07-01 RX ADMIN — ONDANSETRON 4 MG: 2 INJECTION INTRAMUSCULAR; INTRAVENOUS at 08:07

## 2019-07-01 RX ADMIN — IRON SUCROSE 100 MG: 20 INJECTION, SOLUTION INTRAVENOUS at 06:07

## 2019-07-01 RX ADMIN — FAMOTIDINE 20 MG: 10 INJECTION, SOLUTION INTRAVENOUS at 11:07

## 2019-07-01 RX ADMIN — ALBUMIN (HUMAN) 12.5 G: 12.5 SOLUTION INTRAVENOUS at 01:07

## 2019-07-01 RX ADMIN — ALBUMIN (HUMAN) 12.5 G: 12.5 SOLUTION INTRAVENOUS at 09:07

## 2019-07-01 RX ADMIN — CYANOCOBALAMIN 1000 MCG: 1000 INJECTION, SOLUTION INTRAMUSCULAR at 11:07

## 2019-07-01 RX ADMIN — DOPAMINE HYDROCHLORIDE IN DEXTROSE 2 MCG/KG/MIN: 1.6 INJECTION, SOLUTION INTRAVENOUS at 09:07

## 2019-07-01 RX ADMIN — TRAMADOL HYDROCHLORIDE 50 MG: 50 TABLET, FILM COATED ORAL at 09:07

## 2019-07-01 RX ADMIN — ALBUMIN (HUMAN) 12.5 G: 12.5 SOLUTION INTRAVENOUS at 05:07

## 2019-07-01 RX ADMIN — CLINDAMYCIN IN 5 PERCENT DEXTROSE 600 MG: 12 INJECTION, SOLUTION INTRAVENOUS at 02:07

## 2019-07-01 RX ADMIN — ONDANSETRON 4 MG: 2 INJECTION INTRAMUSCULAR; INTRAVENOUS at 12:07

## 2019-07-01 RX ADMIN — RAMELTEON 8 MG: 8 TABLET, FILM COATED ORAL at 09:07

## 2019-07-01 RX ADMIN — PANCRELIPASE 2 CAPSULE: 30000; 6000; 19000 CAPSULE, DELAYED RELEASE PELLETS ORAL at 12:07

## 2019-07-01 RX ADMIN — SODIUM CHLORIDE 250 ML: 0.9 INJECTION, SOLUTION INTRAVENOUS at 08:07

## 2019-07-01 RX ADMIN — CLINDAMYCIN IN 5 PERCENT DEXTROSE 600 MG: 12 INJECTION, SOLUTION INTRAVENOUS at 05:07

## 2019-07-01 RX ADMIN — ALBUMIN (HUMAN) 12.5 G: 12.5 SOLUTION INTRAVENOUS at 02:07

## 2019-07-01 RX ADMIN — ALBUMIN (HUMAN) 12.5 G: 12.5 SOLUTION INTRAVENOUS at 07:07

## 2019-07-01 RX ADMIN — PANCRELIPASE 1 CAPSULE: 24000; 76000; 120000 CAPSULE, DELAYED RELEASE PELLETS ORAL at 12:07

## 2019-07-01 RX ADMIN — FAMOTIDINE 20 MG: 10 INJECTION, SOLUTION INTRAVENOUS at 09:07

## 2019-07-01 RX ADMIN — ACETYLCYSTEINE: 200 INJECTION, SOLUTION INTRAVENOUS at 10:07

## 2019-07-01 RX ADMIN — INSULIN DETEMIR 14 UNITS: 100 INJECTION, SOLUTION SUBCUTANEOUS at 09:07

## 2019-07-01 RX ADMIN — HEPARIN SODIUM 5000 UNITS: 5000 INJECTION, SOLUTION INTRAVENOUS; SUBCUTANEOUS at 05:07

## 2019-07-01 RX ADMIN — HEPARIN SODIUM 5000 UNITS: 5000 INJECTION, SOLUTION INTRAVENOUS; SUBCUTANEOUS at 09:07

## 2019-07-01 RX ADMIN — HEPARIN SODIUM 5000 UNITS: 5000 INJECTION, SOLUTION INTRAVENOUS; SUBCUTANEOUS at 02:07

## 2019-07-01 RX ADMIN — PANCRELIPASE 1 CAPSULE: 24000; 76000; 120000 CAPSULE, DELAYED RELEASE PELLETS ORAL at 09:07

## 2019-07-01 RX ADMIN — CLINDAMYCIN IN 5 PERCENT DEXTROSE 600 MG: 12 INJECTION, SOLUTION INTRAVENOUS at 09:07

## 2019-07-01 RX ADMIN — SODIUM CHLORIDE 250 ML: 0.9 INJECTION, SOLUTION INTRAVENOUS at 01:07

## 2019-07-01 RX ADMIN — PANCRELIPASE 2 CAPSULE: 30000; 6000; 19000 CAPSULE, DELAYED RELEASE PELLETS ORAL at 05:07

## 2019-07-01 RX ADMIN — POTASSIUM CHLORIDE 40 MEQ: 20 TABLET, EXTENDED RELEASE ORAL at 11:07

## 2019-07-01 RX ADMIN — POTASSIUM CHLORIDE 40 MEQ: 20 TABLET, EXTENDED RELEASE ORAL at 09:07

## 2019-07-01 RX ADMIN — ATORVASTATIN CALCIUM 40 MG: 40 TABLET, FILM COATED ORAL at 11:07

## 2019-07-01 RX ADMIN — PANCRELIPASE 1 CAPSULE: 24000; 76000; 120000 CAPSULE, DELAYED RELEASE PELLETS ORAL at 05:07

## 2019-07-01 RX ADMIN — TAMSULOSIN HYDROCHLORIDE 0.4 MG: 0.4 CAPSULE ORAL at 11:07

## 2019-07-01 NOTE — PT/OT/SLP PROGRESS
Occupational Therapy   Treatment    Name: Jian Arrieta  MRN: [id number]  Admitting Diagnosis:  Portal hypertension  3 Days Post-Op    Recommendations:     Discharge Recommendations: nursing facility, skilled  Discharge Equipment Recommendations:  none  Barriers to discharge:  Inaccessible home environment, Decreased caregiver support    Assessment:   Patient tolerated OOB to chair this date. Patient will benefit from continued skilled OT to address deficits and improve performance in functional ADL tasks. Cont OT.    Jian Arrieta is a 63 y.o. male with a medical diagnosis of Portal hypertension. Performance deficits affecting function are weakness, impaired endurance, impaired self care skills, impaired functional mobilty, gait instability, impaired balance, decreased lower extremity function, decreased upper extremity function, decreased safety awareness, pain, impaired skin, decreased ROM, edema, impaired cardiopulmonary response to activity.     Rehab Prognosis:  Good and Fair; patient would benefit from acute skilled OT services to address these deficits and reach maximum level of function.       Plan:     Patient to be seen 5 x/week to address the above listed problems via self-care/home management, therapeutic activities, therapeutic exercises  · Plan of Care Expires: 07/29/19  · Plan of Care Reviewed with: patient    Subjective     Pain/Comfort:  · Pain Rating 1: 5/10  · Location - Side 1: Bilateral  · Location - Orientation 1: generalized  · Location 1: back  · Pain Rating Post-Intervention 1: 5/10    Objective:     Communicated with: nurseYoli prior to session.  Patient found seated EOB /c PT with central line, telemetry upon OT entry to room.    General Precautions: Standard, fall, hearing impaired   Orthopedic Precautions:N/A   Braces: N/A     Bed Mobility:    · Completed /c PT    Functional Mobility/Transfers:  · Patient completed Sit <> Stand Transfer with minimum assistance  with  rolling walker    · Patient completed Bed <> Chair Transfer using Step Transfer technique with minimum assistance with rolling walker  · Functional Mobility: Able to take ~5 steps to bedside chair using RW    Activities of Daily Living:  · Lower Body Dressing: total assistance to krystal shoes      [facility] 6 Click ADL: 18    Patient left up in chair with all lines intact, call button in reach and nurse presentEducation:      GOALS:   Multidisciplinary Problems     Occupational Therapy Goals        Problem: Occupational Therapy Goal    Goal Priority Disciplines Outcome Interventions   Occupational Therapy Goal     OT, PT/OT Ongoing (interventions implemented as appropriate)    Description:  Goals to be met by: 7/29/2019    Patient will increase functional independence with ADLs by performing:    UE Dressing with Modified [place].  LE Dressing with Modified [place].  Toileting from toilet with Modified [place] for hygiene and clothing management.   Supine to sit with Modified [place].  Step transfer with Modified [place]  Toilet transfer to toilet with Modified [place].  Increased functional strength to WFL for ADLS.  Upper extremity exercise program 10 reps per handout, with supervision.                      Time Tracking:     OT Date of Treatment: 07/01/19  OT Start Time: 1150  OT Stop Time: 1205  OT Total Time (min): 15 min    Billable Minutes:Therapeutic Activity 15    AFIA Melendez  7/1/2019

## 2019-07-01 NOTE — PT/OT/SLP PROGRESS
Physical Therapy Treatment    Patient Name:  Jian Arrieta   MRN:  [id number]    Recommendations:     Discharge Recommendations:  nursing facility, skilled   Discharge Equipment Recommendations: none   Barriers to discharge: Decreased caregiver support    Assessment:     Jina Arrieta is a 63 y.o. male admitted with a medical diagnosis of Portal hypertension.  He presents with the following impairments/functional limitations:  weakness, gait instability, decreased upper extremity function, decreased lower extremity function, impaired cardiopulmonary response to activity, impaired functional mobilty, impaired self care skills, pain, impaired skin, edema, impaired balance, impaired endurance . Patient able to come from supine <> side lying L <> sit with mod to min assist. Sit <> stand with min assist using RW. Took ~ 5 steps with standing transfer using RW to chair at bedside.    Rehab Prognosis: Good; patient would benefit from acute skilled PT services to address these deficits and reach maximum level of function.    Recent Surgery: Procedure(s) (LRB):  ANGIOGRAM-PV STENT (N/A) 3 Days Post-Op    Plan:     During this hospitalization, patient to be seen 6 x/week to address the identified rehab impairments via gait training, therapeutic activities, therapeutic exercises and progress toward the following goals:    · Plan of Care Expires:  07/28/19    Subjective     Chief Complaint: pain and weakness  Patient/Family Comments/goals: get well and go home  Pain/Comfort:  · Pain Rating 1: 5/10  · Location - Side 1: Bilateral  · Location - Orientation 1: generalized  · Location 1: back  · Pain Addressed 1: Reposition, Distraction  · Pain Rating Post-Intervention 1: 5/10      Objective:     Communicated with primary nurse prior to session.  Patient found HOB elevated with central line, telemetry upon PT entry to room.     General Precautions: Standard, fall, hearing impaired   Orthopedic Precautions:N/A   Braces: N/A      Functional Mobility:  · Bed Mobility:     · Supine to Sit: minimum assistance and moderate assistance  · Sit to Supine: minimum assistance and moderate assistance  · Transfers:     · Sit to Stand:  minimum assistance with rolling walker  · Gait: Min to CG assist with RW ~ 5 steps  · Balance: poor + to fair with RW      AM-PAC 6 CLICK MOBILITY  Turning over in bed (including adjusting bedclothes, sheets and blankets)?: 3  Sitting down on and standing up from a chair with arms (e.g., wheelchair, bedside commode, etc.): 3  Moving from lying on back to sitting on the side of the bed?: 3  Moving to and from a bed to a chair (including a wheelchair)?: 3  Need to walk in hospital room?: 3  Climbing 3-5 steps with a railing?: 1  Basic Mobility Total Score: 16       Therapeutic Activities and Exercises:   Reviewed AROM ex BLE seated in chair    Patient left up in chair with all lines intact, call button in reach and primary nurese present..    GOALS:   Multidisciplinary Problems     Physical Therapy Goals        Problem: Physical Therapy Goal    Goal Priority Disciplines Outcome Goal Variances Interventions   Physical Therapy Goal     PT, PT/OT Ongoing (interventions implemented as appropriate)     Description:  Goals to be met by: 2019    Patient will increase functional independence with mobility by performin. Sit to supine with Stand-by Assistance  2. Sit to stand transfer with Supervision  3. Bed to chair transfer with Stand-by Assistance using Rolling Walker  3. Gait  x20 feet with Contact Guard Assistance using Rolling Walker.   4. Lower extremity exercise program x12 reps per handout, with supervision                       Time Tracking:     PT Received On: 19  PT Start Time: 1135     PT Stop Time: 1205  PT Total Time (min): 30 min     Billable Minutes: Gait Training 15    Treatment Type: Treatment  PT/PTA: PT     PTA Visit Number: 1     Huan Schumacher, PT  2019

## 2019-07-01 NOTE — PLAN OF CARE
Problem: Adult Inpatient Plan of Care  Goal: Plan of Care Review  Outcome: Ongoing (interventions implemented as appropriate)  Pt AAOx3. Bumex, albumin and dopamine infusing per MD orders. IV abx given per MD orders. Pt with no complaints of pain or discomfort. Pt tolerating diet with no complaints of nausea. Blood glucose monitoring continued Q6H. Safety precautions maintained. Will continue to monitor.

## 2019-07-01 NOTE — PLAN OF CARE
Problem: Physical Therapy Goal  Goal: Physical Therapy Goal  Goals to be met by: 2019    Patient will increase functional independence with mobility by performin. Sit to supine with Stand-by Assistance  2. Sit to stand transfer with Supervision  3. Bed to chair transfer with Stand-by Assistance using Rolling Walker  3. Gait  x20 feet with Contact Guard Assistance using Rolling Walker.   4. Lower extremity exercise program x12 reps per handout, with supervision      Outcome: Ongoing (interventions implemented as appropriate)  Recommend SNF

## 2019-07-01 NOTE — PLAN OF CARE
TN met with patient and informed him on continued discharge planning. TN also called and spoke to sister regarding plan. Pt and sister state they would like SNF referrals sent to [facility], [facility], [facility], and [facility]. Pt wife works full time and and he is alone in home during the day. Per sister pt needs more help at home. Pt has no other needs or concerns. Will continue to monitor.    Future Appointments   Date Time Provider Department Center   7/12/2019  8:00 AM [facility] IR1 [facility] RAD IR [facility]   7/12/2019 10:00 AM SON Rowe MD [facility] TUMOR [facility]   8/2/2019 11:20 AM Leon Barajas DO [facility] IM [place]   8/26/2019 11:00 AM Jaime Carney III, MD [facility] CARDIO [place]        07/01/19 1700   Discharge Reassessment   Assessment Type Discharge Planning Reassessment   Provided patient/caregiver education on the expected discharge date and the discharge plan Yes   Do you have any problems affording any of your prescribed medications? No   Discharge Plan A Skilled Nursing Facility   Discharge Plan B Home;Home with family;Home Health   DME Needed Upon Discharge  none   Patient choice form signed by patient/caregiver N/A   Anticipated Discharge Disposition SNF   Can the patient answer the patient profile reliably? Yes, cognitively intact   How does the patient rate their overall health at the present time? Good   Describe the patient's ability to walk at the present time. Walks with the help of equipment   How often would a person be available to care for the patient? Infrequently   Number of comorbid conditions (as recorded on the chart) Five or more

## 2019-07-01 NOTE — ANESTHESIA POSTPROCEDURE EVALUATION
Anesthesia Post Evaluation    Patient: Jian Arrieta    Procedure(s) Performed: Procedure(s) (LRB):  ANGIOGRAM-PV STENT (N/A)    Final Anesthesia Type: MAC  Patient location during evaluation: PACU  Patient participation: Yes- Able to Participate  Level of consciousness: awake and alert  Post-procedure vital signs: reviewed and not stable  Pain management: adequate  Airway patency: patent  PONV status at discharge: No PONV  Anesthetic complications: no      Cardiovascular status: blood pressure returned to baseline  Respiratory status: unassisted, spontaneous ventilation and room air  Hydration status: euvolemic  Follow-up not needed.        See Epic for associated vital signs                                      Event Time     Out of Recovery 06/28/2019 18:52:00          Pain/Jason Score: No data recorded

## 2019-07-01 NOTE — NURSING
Reported 5 beat run of v tach per [employer] tech   Vitals taken  Pt experiencing some nausea at present  Vitals taken and documented   [facility] surgical team made rounds as vital signs completed.  Dr power informed of current complaint of a shooting pain across his back

## 2019-07-01 NOTE — PROGRESS NOTES
Progress Note    Admit Date: 6/27/2019   LOS: 4 days     SUBJECTIVE:   NAEON.  No N/V. VSS, afebrile. Tolerating diet. No complaints.       Scheduled Meds:   custom IVPB builder   Intravenous BID    atorvastatin  40 mg Oral Daily    clindamycin 600 MG/50 ML D5W  600 mg Intravenous Q8H    cyanocobalamin  1,000 mcg Subcutaneous Daily    epoetin vladimir-ebpx (RETACRIT) injection  100 Units/kg Subcutaneous Daily    famotidine (PF)  20 mg Intravenous BID    furosemide  20 mg Oral BID    heparin (porcine)  5,000 Units Subcutaneous Q8H    iron sucrose  100 mg Intravenous QAM    lidocaine (PF) 10 mg/ml (1%)  1 mL Other Once    lipase-protease-amylase 24,000-76,000-120,000 units  1 capsule Oral TID WM    And    lipase-protease-amylase 6,000-19,000-30,000 units  2 capsule Oral TID WM    metoprolol succinate  50 mg Oral Daily    potassium chloride  40 mEq Oral BID    spironolactone  50 mg Oral BID    tamsulosin  1 capsule Oral Daily     Continuous Infusions:   albumin human 25% 12.5 g (07/01/19 0730)    bumex/mannitol infusion 3 mL/hr at 06/30/19 1946    DOPamine 2 mcg/kg/min (06/30/19 0600)     PRN Meds:acetaminophen, albuterol-ipratropium, Dextrose 10% Bolus, gelatin adsorbable 100cm top sponge, glucagon (human recombinant), insulin aspart U-100, lidocaine HCL 10 mg/ml (1%), ondansetron, ramelteon, senna-docusate 8.6-50 mg, sodium chloride 0.9%, traMADol, traMADol    Review of patient's allergies indicates:  No Known Allergies    OBJECTIVE:     Vital Signs (Most Recent)  Temp: 97.7 °F (36.5 °C) (07/01/19 0813)  Pulse: (!) 51 (07/01/19 0813)  Resp: 18 (07/01/19 0813)  BP: (!) 90/52 (07/01/19 0813)  SpO2: 97 % (07/01/19 0814)    Vital Signs Range (Last 24H):  Temp:  [97.7 °F (36.5 °C)-98.6 °F (37 °C)]   Pulse:  []   Resp:  [16-22]   BP: ()/(52-66)   SpO2:  [97 %]     I & O (Last 24H):    Intake/Output Summary (Last 24 hours) at 7/1/2019 0939  Last data filed at 7/1/2019 0453  Gross per 24 hour    Intake 535.17 ml   Output 990 ml   Net -454.83 ml     Physical Exam:  NAD, AAO  Morbidly obese  RRR  Non-labored breathing, good respiratory effort  abd soft, obese, non-tender, no rebound, no guarding, no peritoneal signs  BLE lymphedema with some weeping noted to R-shin, dressing covering weeping C/D/I.     LABS  CBC  Recent Labs   Lab 06/29/19  0627 06/30/19  0426 07/01/19  0440   WBC 4.62 4.16 5.18   RBC 2.85* 2.47* 2.54*   HGB 8.1* 7.0* 7.2*   HCT 25.2* 21.8* 22.4*    215 228   MCV 88 88 88   MCH 28.4 28.3 28.3   MCHC 32.1 32.1 32.1     CMP  Recent Labs   Lab 06/29/19  0627 06/29/19  1611 06/30/19  0426 07/01/19  0440   * 262* 200* 175*   CALCIUM 7.8* 7.6* 7.8* 8.0*   ALBUMIN 2.0*  --  2.3* 2.5*   PROT 4.4*  --  4.2* 4.2*   * 132* 133* 135*   K 3.7 3.8 3.6 4.1   CO2 27 28 28 27    99 99 101   BUN 29* 31* 32* 37*   CREATININE 1.7* 2.0* 2.3* 3.0*   ALKPHOS 107  --  87 80   ALT 29  --  21 16   AST 25  --  16 16   BILITOT 0.3  --  0.4 0.4       Recent Labs   Lab 06/29/19  1611 06/30/19  0426 07/01/19  0440   CALCIUM 7.6* 7.8* 8.0*   MG 1.6 1.8 1.8   PHOS 3.9 3.8 4.5         POCT-Glucose  POCT Glucose   Date Value Ref Range Status   07/01/2019 200 (H) 70 - 110 mg/dL Final   06/30/2019 204 (H) 70 - 110 mg/dL Final   06/30/2019 232 (H) 70 - 110 mg/dL Final   06/30/2019 259 (H) 70 - 110 mg/dL Final   06/30/2019 218 (H) 70 - 110 mg/dL Final   06/29/2019 260 (H) 70 - 110 mg/dL Final   06/29/2019 294 (H) 70 - 110 mg/dL Final   06/29/2019 244 (H) 70 - 110 mg/dL Final   06/29/2019 230 (H) 70 - 110 mg/dL Final   06/29/2019 314 (H) 70 - 110 mg/dL Final   06/28/2019 219 (H) 70 - 110 mg/dL Final   06/28/2019 296 (H) 70 - 110 mg/dL Final       COAGS  Recent Labs   Lab 06/29/19  0627 06/30/19  0426 07/01/19  0440   INR 1.1 1.2 1.2       CE  Recent Labs   Lab 06/25/19  1643 06/27/19  2213   TROPONINI <0.006 0.006     BNP  Recent Labs   Lab 06/25/19  1643   *     UA  Recent Labs   Lab  06/25/19  1734   COLORU Yellow   SPECGRAV 1.010   PHUR 5.0   PROTEINUA Negative   NITRITE Negative   LEUKOCYTESUR Negative       LAST HbA1c  Lab Results   Component Value Date    HGBA1C 7.1 (H) 05/23/2019       ASSESSMENT/PLAN:   63yoM with multiple comorbidities with extrahepatic PV occlusion plus mild cirrhosis with CRI and mild CHF contributing to recalcitrant ascites plus non-compliance with sodium/fluids as well as fatigue due to hypokalemia and fluid overload, now s/p paracentesis, transhepatic portal venogram, pressure measurements, and venoplasty with IR 6/28/19.       -diabetic 2000cal diet, cardiac diet, 2g Na diet with <1L fluid restritiction  -IV clindamycin, transition to PO prior to discharge  -continue renal-dosed non-titrating dopamine 2mcg/kg/min  -continue 1200mg IV mucomyst BID  -replete electrolytes prn  -lasix and aldactone for diuresis  -strict I&Os  -wound care consult for weeping BLE lymphedema, appreciate recs  -elevate BLE  -SCDs bilat for DVT ppx  -Pepcid for GI ppx    Liliane Churchill MD  PGY-2  [facility]  07/01/2019

## 2019-07-01 NOTE — PLAN OF CARE
sent SNF referrals to patient's preferences in order: 1) [facility], 2) [facility],3) [facility], and 4) [facility]. Will follow up with facilities for approval.        07/01/19 1716   Post-Acute Status   Post-Acute Authorization Placement   Post-Acute Placement Status Referrals Sent

## 2019-07-02 ENCOUNTER — TELEPHONE (OUTPATIENT)
Dept: INTERNAL MEDICINE | Facility: CLINIC | Age: 64
End: 2019-07-02

## 2019-07-02 LAB
ALBUMIN SERPL BCP-MCNC: 2.8 G/DL (ref 3.5–5.2)
ALP SERPL-CCNC: 68 U/L (ref 55–135)
ALT SERPL W/O P-5'-P-CCNC: 14 U/L (ref 10–44)
ANION GAP SERPL CALC-SCNC: 7 MMOL/L (ref 8–16)
AST SERPL-CCNC: 20 U/L (ref 10–40)
BASOPHILS # BLD AUTO: 0.01 K/UL (ref 0–0.2)
BASOPHILS NFR BLD: 0.2 % (ref 0–1.9)
BILIRUB SERPL-MCNC: 0.4 MG/DL (ref 0.1–1)
BUN SERPL-MCNC: 42 MG/DL (ref 8–23)
CALCIUM SERPL-MCNC: 8.3 MG/DL (ref 8.7–10.5)
CHLORIDE SERPL-SCNC: 102 MMOL/L (ref 95–110)
CO2 SERPL-SCNC: 27 MMOL/L (ref 23–29)
CREAT SERPL-MCNC: 3.6 MG/DL (ref 0.5–1.4)
CREAT UR-MCNC: 18.4 MG/DL (ref 23–375)
DIFFERENTIAL METHOD: ABNORMAL
EOSINOPHIL # BLD AUTO: 0.1 K/UL (ref 0–0.5)
EOSINOPHIL NFR BLD: 1.7 % (ref 0–8)
ERYTHROCYTE [DISTWIDTH] IN BLOOD BY AUTOMATED COUNT: 14.3 % (ref 11.5–14.5)
EST. GFR  (AFRICAN AMERICAN): 19 ML/MIN/1.73 M^2
EST. GFR  (NON AFRICAN AMERICAN): 17 ML/MIN/1.73 M^2
GLUCOSE SERPL-MCNC: 135 MG/DL (ref 70–110)
HCT VFR BLD AUTO: 21.8 % (ref 40–54)
HGB BLD-MCNC: 7.1 G/DL (ref 14–18)
INR PPP: 1.2 (ref 0.8–1.2)
LYMPHOCYTES # BLD AUTO: 1.8 K/UL (ref 1–4.8)
LYMPHOCYTES NFR BLD: 33.5 % (ref 18–48)
MAGNESIUM SERPL-MCNC: 1.9 MG/DL (ref 1.6–2.6)
MCH RBC QN AUTO: 28.7 PG (ref 27–31)
MCHC RBC AUTO-ENTMCNC: 32.6 G/DL (ref 32–36)
MCV RBC AUTO: 88 FL (ref 82–98)
MONOCYTES # BLD AUTO: 0.4 K/UL (ref 0.3–1)
MONOCYTES NFR BLD: 6.7 % (ref 4–15)
NEUTROPHILS # BLD AUTO: 3 K/UL (ref 1.8–7.7)
NEUTROPHILS NFR BLD: 57.9 % (ref 38–73)
PHOSPHATE SERPL-MCNC: 4.7 MG/DL (ref 2.7–4.5)
PLATELET # BLD AUTO: 213 K/UL (ref 150–350)
PMV BLD AUTO: 8.6 FL (ref 9.2–12.9)
POCT GLUCOSE: 159 MG/DL (ref 70–110)
POCT GLUCOSE: 173 MG/DL (ref 70–110)
POCT GLUCOSE: 208 MG/DL (ref 70–110)
POCT GLUCOSE: 254 MG/DL (ref 70–110)
POCT GLUCOSE: 89 MG/DL (ref 70–110)
POTASSIUM SERPL-SCNC: 4.7 MMOL/L (ref 3.5–5.1)
PROT SERPL-MCNC: 4.5 G/DL (ref 6–8.4)
PROTHROMBIN TIME: 12.2 SEC (ref 9–12.5)
RBC # BLD AUTO: 2.47 M/UL (ref 4.6–6.2)
SODIUM SERPL-SCNC: 136 MMOL/L (ref 136–145)
SODIUM UR-SCNC: 81 MMOL/L (ref 20–250)
WBC # BLD AUTO: 5.34 K/UL (ref 3.9–12.7)

## 2019-07-02 PROCEDURE — 63600175 PHARM REV CODE 636 W HCPCS: Performed by: SURGERY

## 2019-07-02 PROCEDURE — 85025 COMPLETE CBC W/AUTO DIFF WBC: CPT

## 2019-07-02 PROCEDURE — 11000001 HC ACUTE MED/SURG PRIVATE ROOM

## 2019-07-02 PROCEDURE — S0077 INJECTION, CLINDAMYCIN PHOSP: HCPCS

## 2019-07-02 PROCEDURE — 25000003 PHARM REV CODE 250

## 2019-07-02 PROCEDURE — 25000003 PHARM REV CODE 250: Performed by: STUDENT IN AN ORGANIZED HEALTH CARE EDUCATION/TRAINING PROGRAM

## 2019-07-02 PROCEDURE — 97110 THERAPEUTIC EXERCISES: CPT

## 2019-07-02 PROCEDURE — 94761 N-INVAS EAR/PLS OXIMETRY MLT: CPT

## 2019-07-02 PROCEDURE — 63600175 PHARM REV CODE 636 W HCPCS: Mod: JG | Performed by: STUDENT IN AN ORGANIZED HEALTH CARE EDUCATION/TRAINING PROGRAM

## 2019-07-02 PROCEDURE — 84100 ASSAY OF PHOSPHORUS: CPT

## 2019-07-02 PROCEDURE — 97116 GAIT TRAINING THERAPY: CPT

## 2019-07-02 PROCEDURE — 84300 ASSAY OF URINE SODIUM: CPT

## 2019-07-02 PROCEDURE — 63600175 PHARM REV CODE 636 W HCPCS

## 2019-07-02 PROCEDURE — 83735 ASSAY OF MAGNESIUM: CPT

## 2019-07-02 PROCEDURE — 25000003 PHARM REV CODE 250: Performed by: SURGERY

## 2019-07-02 PROCEDURE — S0028 INJECTION, FAMOTIDINE, 20 MG: HCPCS

## 2019-07-02 PROCEDURE — P9047 ALBUMIN (HUMAN), 25%, 50ML: HCPCS | Mod: JG | Performed by: STUDENT IN AN ORGANIZED HEALTH CARE EDUCATION/TRAINING PROGRAM

## 2019-07-02 PROCEDURE — 80053 COMPREHEN METABOLIC PANEL: CPT

## 2019-07-02 PROCEDURE — 82570 ASSAY OF URINE CREATININE: CPT

## 2019-07-02 PROCEDURE — 85610 PROTHROMBIN TIME: CPT

## 2019-07-02 RX ORDER — FAMOTIDINE 10 MG/ML
20 INJECTION INTRAVENOUS DAILY
Status: DISCONTINUED | OUTPATIENT
Start: 2019-07-03 | End: 2019-07-05

## 2019-07-02 RX ORDER — SODIUM CHLORIDE 9 MG/ML
INJECTION, SOLUTION INTRAVENOUS CONTINUOUS
Status: DISCONTINUED | OUTPATIENT
Start: 2019-07-02 | End: 2019-07-04

## 2019-07-02 RX ADMIN — ALBUMIN (HUMAN) 12.5 G: 12.5 SOLUTION INTRAVENOUS at 05:07

## 2019-07-02 RX ADMIN — CLINDAMYCIN IN 5 PERCENT DEXTROSE 600 MG: 12 INJECTION, SOLUTION INTRAVENOUS at 09:07

## 2019-07-02 RX ADMIN — ACETYLCYSTEINE: 200 INJECTION, SOLUTION INTRAVENOUS at 09:07

## 2019-07-02 RX ADMIN — SODIUM CHLORIDE 250 ML: 0.9 INJECTION, SOLUTION INTRAVENOUS at 11:07

## 2019-07-02 RX ADMIN — FAMOTIDINE 20 MG: 10 INJECTION, SOLUTION INTRAVENOUS at 09:07

## 2019-07-02 RX ADMIN — HEPARIN SODIUM 5000 UNITS: 5000 INJECTION, SOLUTION INTRAVENOUS; SUBCUTANEOUS at 06:07

## 2019-07-02 RX ADMIN — SODIUM CHLORIDE: 0.9 INJECTION, SOLUTION INTRAVENOUS at 06:07

## 2019-07-02 RX ADMIN — ONDANSETRON 4 MG: 2 INJECTION INTRAMUSCULAR; INTRAVENOUS at 06:07

## 2019-07-02 RX ADMIN — PANCRELIPASE 1 CAPSULE: 24000; 76000; 120000 CAPSULE, DELAYED RELEASE PELLETS ORAL at 11:07

## 2019-07-02 RX ADMIN — CLINDAMYCIN IN 5 PERCENT DEXTROSE 600 MG: 12 INJECTION, SOLUTION INTRAVENOUS at 06:07

## 2019-07-02 RX ADMIN — HEPARIN SODIUM 5000 UNITS: 5000 INJECTION, SOLUTION INTRAVENOUS; SUBCUTANEOUS at 10:07

## 2019-07-02 RX ADMIN — PANCRELIPASE 2 CAPSULE: 30000; 6000; 19000 CAPSULE, DELAYED RELEASE PELLETS ORAL at 09:07

## 2019-07-02 RX ADMIN — ALBUMIN (HUMAN) 12.5 G: 12.5 SOLUTION INTRAVENOUS at 06:07

## 2019-07-02 RX ADMIN — ALBUMIN (HUMAN) 12.5 G: 12.5 SOLUTION INTRAVENOUS at 11:07

## 2019-07-02 RX ADMIN — INSULIN DETEMIR 14 UNITS: 100 INJECTION, SOLUTION SUBCUTANEOUS at 09:07

## 2019-07-02 RX ADMIN — HEPARIN SODIUM 5000 UNITS: 5000 INJECTION, SOLUTION INTRAVENOUS; SUBCUTANEOUS at 01:07

## 2019-07-02 RX ADMIN — INSULIN ASPART 4 UNITS: 100 INJECTION, SOLUTION INTRAVENOUS; SUBCUTANEOUS at 06:07

## 2019-07-02 RX ADMIN — CLINDAMYCIN IN 5 PERCENT DEXTROSE 600 MG: 12 INJECTION, SOLUTION INTRAVENOUS at 01:07

## 2019-07-02 RX ADMIN — METOPROLOL SUCCINATE 50 MG: 25 TABLET, FILM COATED, EXTENDED RELEASE ORAL at 09:07

## 2019-07-02 RX ADMIN — IRON SUCROSE 100 MG: 20 INJECTION, SOLUTION INTRAVENOUS at 06:07

## 2019-07-02 RX ADMIN — ALBUMIN (HUMAN) 12.5 G: 12.5 SOLUTION INTRAVENOUS at 09:07

## 2019-07-02 RX ADMIN — PANCRELIPASE 1 CAPSULE: 24000; 76000; 120000 CAPSULE, DELAYED RELEASE PELLETS ORAL at 09:07

## 2019-07-02 RX ADMIN — ATORVASTATIN CALCIUM 40 MG: 40 TABLET, FILM COATED ORAL at 09:07

## 2019-07-02 RX ADMIN — TAMSULOSIN HYDROCHLORIDE 0.4 MG: 0.4 CAPSULE ORAL at 09:07

## 2019-07-02 RX ADMIN — INSULIN ASPART 4 UNITS: 100 INJECTION, SOLUTION INTRAVENOUS; SUBCUTANEOUS at 05:07

## 2019-07-02 RX ADMIN — PANCRELIPASE 2 CAPSULE: 30000; 6000; 19000 CAPSULE, DELAYED RELEASE PELLETS ORAL at 05:07

## 2019-07-02 RX ADMIN — DOPAMINE HYDROCHLORIDE IN DEXTROSE 2 MCG/KG/MIN: 1.6 INJECTION, SOLUTION INTRAVENOUS at 09:07

## 2019-07-02 RX ADMIN — CYANOCOBALAMIN 1000 MCG: 1000 INJECTION, SOLUTION INTRAMUSCULAR at 09:07

## 2019-07-02 RX ADMIN — EPOETIN ALFA-EPBX 10000 UNITS: 10000 INJECTION, SOLUTION INTRAVENOUS; SUBCUTANEOUS at 09:07

## 2019-07-02 RX ADMIN — PANCRELIPASE 2 CAPSULE: 30000; 6000; 19000 CAPSULE, DELAYED RELEASE PELLETS ORAL at 11:07

## 2019-07-02 RX ADMIN — POTASSIUM CHLORIDE 40 MEQ: 20 TABLET, EXTENDED RELEASE ORAL at 09:07

## 2019-07-02 RX ADMIN — PANCRELIPASE 1 CAPSULE: 24000; 76000; 120000 CAPSULE, DELAYED RELEASE PELLETS ORAL at 05:07

## 2019-07-02 NOTE — PLAN OF CARE
Problem: Physical Therapy Goal  Goal: Physical Therapy Goal  Goals to be met by: 2019    Patient will increase functional independence with mobility by performin. Sit to supine with Stand-by Assistance  2. Sit to stand transfer with Supervision  3. Bed to chair transfer with Stand-by Assistance using Rolling Walker  3. Gait  x20 feet with Contact Guard Assistance using Rolling Walker.   4. Lower extremity exercise program x12 reps per handout, with supervision      Outcome: Ongoing (interventions implemented as appropriate)  Goals ongoing

## 2019-07-02 NOTE — PLAN OF CARE
called [facility] at [phone], spoke with Paula, completed LOCET.  faxed [facility], awaiting state Form 142.        07/02/19 1108   Post-Acute Status   Post-Acute Authorization Placement   Post-Acute Placement Status Pending State Certification

## 2019-07-02 NOTE — PT/OT/SLP PROGRESS
Physical Therapy Treatment    Patient Name:  Jian Arrieta   MRN:  [id number]    Recommendations:     Discharge Recommendations:  nursing facility, skilled   Discharge Equipment Recommendations: none   Barriers to discharge: Decreased caregiver support and decdecreased mobility,strength and endurance    Assessment:     Jian Arrieta is a 63 y.o. male admitted with a medical diagnosis of Portal hypertension.  He presents with the following impairments/functional limitations:  weakness, impaired endurance, impaired functional mobilty, gait instability, impaired balance, decreased lower extremity function, decreased ROM, impaired coordination, decreased safety awareness,pt with good participation and requires direction with attention to task,pt requires assistance for safety and limited endurance,pt will benefit from SNF upon discharge.    Rehab Prognosis: Good; patient would benefit from acute skilled PT services to address these deficits and reach maximum level of function.    Recent Surgery: Procedure(s) (LRB):  ANGIOGRAM-PV STENT (N/A) 4 Days Post-Op    Plan:     During this hospitalization, patient to be seen 6 x/week to address the identified rehab impairments via gait training, therapeutic activities, therapeutic exercises and progress toward the following goals:    · Plan of Care Expires:  07/28/19    Subjective     Chief Complaint: n/a  Patient/Family Comments/goals: pt states he gets weak.  Pain/Comfort:  · Pain Rating 1: (no c/o's)      Objective:     Communicated with nsg prior to session.  Patient found supine with central line, bed alarm, peripheral IV, telemetry upon PT entry to room.     General Precautions: Standard, fall, hearing impaired   Orthopedic Precautions:N/A   Braces: N/A     Functional Mobility:  · Bed Mobility:     · Supine to Sit: minimum assistance  · Transfers:     · Sit to Stand:  minimum assistance with rolling walker  · Gait: 2-3 sidesteps to HOB X 2 with Min A and RW  · Balance: fair  standing balance with RW      AM-PAC 6 CLICK MOBILITY  Turning over in bed (including adjusting bedclothes, sheets and blankets)?: 3  Sitting down on and standing up from a chair with arms (e.g., wheelchair, bedside commode, etc.): 3  Moving from lying on back to sitting on the side of the bed?: 3  Moving to and from a bed to a chair (including a wheelchair)?: 3  Need to walk in hospital room?: 3  Climbing 3-5 steps with a railing?: 1  Basic Mobility Total Score: 16       Therapeutic Activities and Exercises: le supine ex's X 15 reps inc: ap,qs,hs,abd/add,slr,rest periods PRN.       Patient left supine with all lines intact, call button in reach, bed alarm on and nsg notified..    GOALS: see general POC  Multidisciplinary Problems     Physical Therapy Goals        Problem: Physical Therapy Goal    Goal Priority Disciplines Outcome Goal Variances Interventions   Physical Therapy Goal     PT, PT/OT Ongoing (interventions implemented as appropriate)     Description:  Goals to be met by: 2019    Patient will increase functional independence with mobility by performin. Sit to supine with Stand-by Assistance  2. Sit to stand transfer with Supervision  3. Bed to chair transfer with Stand-by Assistance using Rolling Walker  3. Gait  x20 feet with Contact Guard Assistance using Rolling Walker.   4. Lower extremity exercise program x12 reps per handout, with supervision                       Time Tracking:     PT Received On: 19  PT Start Time: 815     PT Stop Time: 0845  PT Total Time (min): 30 min     Billable Minutes: Gait Training 15 and Therapeutic Exercise 15    Treatment Type: Treatment  PT/PTA: PTA     PTA Visit Number: 2     Thom Reyes, RICHY  2019

## 2019-07-02 NOTE — PLAN OF CARE
reached out to Alicia [facility] , [phone], patient has been denied by two placements: 1) [facility] and 2) [facility], due to facilities being out of network, awaiting for Alicia to reach back with reference for in network facilities for SNF.

## 2019-07-02 NOTE — PLAN OF CARE
Problem: Adult Inpatient Plan of Care  Goal: Plan of Care Review  Outcome: Ongoing (interventions implemented as appropriate)  Pt AAOx3. VSS. Pt with complaints of lower back pain with moderate relief from PRN tramadol. Albumin infusing at 10 ml/hr. Dopamine infusing at 9 ml/hr. IV ABX given per MD orders. Blood glucose monitoring continued. Bed locked in lowest position, bed alarm set and call bell within reach. Will continue to monitor.

## 2019-07-02 NOTE — TELEPHONE ENCOUNTER
It says currently in the hospital--    Spoke to [employer] will refax supply order request again to the right number

## 2019-07-02 NOTE — PHYSICIAN QUERY
PT Name: Jian Arrieta  MR #: [id number]    Physician Query Form - Consultant Diagnosis Clarification     CDS: Veronica Paul RN     Contact information:  [email or web address]    Phone: [phone]  This form is a permanent document in the medical record.     Query Date: July 2, 2019      By submitting this query, we are merely seeking further clarification of documentation.  Please utilize your independent clinical judgment when addressing the question(s) below.      The Medical record contains the following:   Diagnosis Supporting Clinical Information Location in Medical Record   Venous stasis ulcer with cellulitisx     Patient initially presented to [facility] for worsening RLE pain and bilateral lower extremity lymphedema contributing to progressive immobility    wound care consult for weeping BLE lymphedema; elevate BLE     Venous stasis wounds on left leg with cellulitis are draining small amount now, wound care orders now needed only M, W, F    Venous stasis dermatitis of both lower extremities were also pertinent to this visit     H&P 6/27/19        H&P 6/27/19      Wound care consult 7/1/19      OT Eval 6/29/19         Do you agree with the Consultants diagnosis of _Venous stasis ulcer with cellulitis____?    [ x ] Yes  POA   [  ] Yes, but it resolved prior to my assessment of the patient   [  ] No   [  ] Clinically insignificant   [  ] Other/Clarification of findings:   [  ] Clinically undetermined

## 2019-07-02 NOTE — CONSULTS
Venous stasis wounds on left leg with cellulitis are draining small amount now, wound care orders now needed only M W, F.

## 2019-07-02 NOTE — PLAN OF CARE
Problem: Adult Inpatient Plan of Care  Goal: Plan of Care Review  Outcome: Ongoing (interventions implemented as appropriate)  Pt experienced increasing fatigue as day progressed  Slept from time returned from chair to bed through supper time    Dressings to lower limbs remain dry and intact  Pt is pleased with the decrease in edema to his lower extremities    Episode nausea experienced    Episode occurred near time of telemetry report of v tach  Telemetry sinus rhythm with pvc's  Safety maintained  Afebrile

## 2019-07-02 NOTE — PLAN OF CARE
Problem: Adult Inpatient Plan of Care  Goal: Plan of Care Review  Outcome: Ongoing (interventions implemented as appropriate)  Pt is AAOx3 with no complaints of pain or n/v. Pt receiving IV antibx as well as continuous dopamine and albumin. Pt was up on the side of the bed with PT today. Pt tolerating diabetic diet well. Pt received a 250ml NS bolus. Drsg on leg changed per night shift and not due til 7/4.

## 2019-07-02 NOTE — TELEPHONE ENCOUNTER
----- Message from Maggie Johns sent at 7/2/2019  2:30 PM CDT -----  Contact: Debo Galvan [phone]  She faxed and order for DME supplies for sterile gauze role and optifoam last fax was sent on 6/5/19. She will fax another request today please call once it's received.    Thank you

## 2019-07-02 NOTE — PT/OT/SLP PROGRESS
Occupational Therapy  Visit Attempt    Patient Name:  Jian Arrieta   MRN:  [id number]    Patient not seen today secondary to nsg performing dressing change. Will follow-up as time permits.    AFIA Melendez  7/2/2019

## 2019-07-02 NOTE — PROGRESS NOTES
Pharmacist Renal Dose Adjustment Note    Jian Arrieta is a 63 y.o. male being treated with the medication  famotidine    Patient Data:    Vital Signs (Most Recent):  Temp: 98.2 °F (36.8 °C) (07/02/19 1135)  Pulse: 100 (07/02/19 1135)  Resp: 20 (07/02/19 1135)  BP: (!) 99/58 (07/02/19 1135)  SpO2: 98 % (07/02/19 0732)   Vital Signs (72h Range):  Temp:  [97.7 °F (36.5 °C)-98.6 °F (37 °C)]   Pulse:  []   Resp:  [16-22]   BP: ()/(44-86)   SpO2:  [96 %-99 %]      Recent Labs   Lab 06/30/19  0426 07/01/19  0440 07/02/19  0440   CREATININE 2.3* 3.0* 3.6*     Serum creatinine: 3.6 mg/dL (H) 07/02/19 0440  Estimated creatinine clearance: 25.3 mL/min (A)    Medication famotidine dose: 20mg  frequency bid will be changed to medication famotidine dose 20mg  frequency daily    Pharmacist's Name: María Greene  Pharmacist's Extension: [phone]

## 2019-07-02 NOTE — PROGRESS NOTES
Progress Note    Admit Date: 6/27/2019   LOS: 5 days     SUBJECTIVE:   NAEON.  No N/V. VSS, afebrile. Tolerating diet. Has ambulated to bedside commode. +BM yesterday.       Scheduled Meds:   custom IVPB builder   Intravenous BID    atorvastatin  40 mg Oral Daily    clindamycin 600 MG/50 ML D5W  600 mg Intravenous Q8H    cyanocobalamin  1,000 mcg Subcutaneous Daily    epoetin vladimir-ebpx (RETACRIT) injection  100 Units/kg Subcutaneous Daily    famotidine (PF)  20 mg Intravenous BID    heparin (porcine)  5,000 Units Subcutaneous Q8H    insulin aspart U-100  4 Units Subcutaneous TIDWM    insulin detemir U-100  14 Units Subcutaneous QHS    iron sucrose  100 mg Intravenous QAM    lidocaine (PF) 10 mg/ml (1%)  1 mL Other Once    lipase-protease-amylase 24,000-76,000-120,000 units  1 capsule Oral TID WM    And    lipase-protease-amylase 6,000-19,000-30,000 units  2 capsule Oral TID WM    metoprolol succinate  50 mg Oral Daily    potassium chloride  40 mEq Oral BID    tamsulosin  1 capsule Oral Daily     Continuous Infusions:   albumin human 25% 12.5 g (07/02/19 0619)    DOPamine 2 mcg/kg/min (07/01/19 0948)     PRN Meds:acetaminophen, albuterol-ipratropium, Dextrose 10% Bolus, gelatin adsorbable 100cm top sponge, glucagon (human recombinant), insulin aspart U-100, lidocaine HCL 10 mg/ml (1%), ondansetron, ramelteon, senna-docusate 8.6-50 mg, sodium chloride 0.9%, traMADol, traMADol    Review of patient's allergies indicates:  No Known Allergies    OBJECTIVE:     Vital Signs (Most Recent)  Temp: 98.5 °F (36.9 °C) (07/02/19 0523)  Pulse: 97 (07/02/19 0523)  Resp: 20 (07/02/19 0523)  BP: 96/62 (07/02/19 0523)  SpO2: 98 % (07/02/19 0023)    Vital Signs Range (Last 24H):  Temp:  [97.7 °F (36.5 °C)-98.6 °F (37 °C)]   Pulse:  []   Resp:  [18-20]   BP: ()/(52-86)   SpO2:  [97 %-99 %]     I & O (Last 24H):    Intake/Output Summary (Last 24 hours) at 7/2/2019 0629  Last data filed at 7/2/2019 0523  Gross  per 24 hour   Intake 827.5 ml   Output 1200 ml   Net -372.5 ml     Physical Exam:  NAD, AAO  Morbidly obese  RRR  Non-labored breathing, good respiratory effort  abd soft, obese, non-tender, no rebound, no guarding, no peritoneal signs  BLE lymphedema with some weeping noted to R-shin, dressing covering weeping C/D/I. Dressing over L-shin also C/D/I.      LABS  CBC  Recent Labs   Lab 06/30/19 0426 07/01/19 0440 07/02/19  0440   WBC 4.16 5.18 5.34   RBC 2.47* 2.54* 2.47*   HGB 7.0* 7.2* 7.1*   HCT 21.8* 22.4* 21.8*    228 213   MCV 88 88 88   MCH 28.3 28.3 28.7   MCHC 32.1 32.1 32.6     CMP  Recent Labs   Lab 06/30/19  0426 07/01/19 0440 07/02/19  0440   * 175* 135*   CALCIUM 7.8* 8.0* 8.3*   ALBUMIN 2.3* 2.5* 2.8*   PROT 4.2* 4.2* 4.5*   * 135* 136   K 3.6 4.1 4.7   CO2 28 27 27   CL 99 101 102   BUN 32* 37* 42*   CREATININE 2.3* 3.0* 3.6*   ALKPHOS 87 80 68   ALT 21 16 14   AST 16 16 20   BILITOT 0.4 0.4 0.4       Recent Labs   Lab 06/30/19  0426 07/01/19 0440 07/02/19  0440   CALCIUM 7.8* 8.0* 8.3*   MG 1.8 1.8 1.9   PHOS 3.8 4.5 4.7*         POCT-Glucose  POCT Glucose   Date Value Ref Range Status   07/02/2019 159 (H) 70 - 110 mg/dL Final   07/01/2019 208 (H) 70 - 110 mg/dL Final   07/01/2019 300 (H) 70 - 110 mg/dL Final   07/01/2019 233 (H) 70 - 110 mg/dL Final   07/01/2019 200 (H) 70 - 110 mg/dL Final   06/30/2019 204 (H) 70 - 110 mg/dL Final   06/30/2019 232 (H) 70 - 110 mg/dL Final   06/30/2019 259 (H) 70 - 110 mg/dL Final   06/30/2019 218 (H) 70 - 110 mg/dL Final   06/29/2019 260 (H) 70 - 110 mg/dL Final   06/29/2019 294 (H) 70 - 110 mg/dL Final   06/29/2019 244 (H) 70 - 110 mg/dL Final       COAGS  Recent Labs   Lab 06/30/19  0426 07/01/19  0440 07/02/19  0440   INR 1.2 1.2 1.2       CE  Recent Labs   Lab 06/25/19  1643 06/27/19  2213 07/01/19  1300   TROPONINI <0.006 0.006 <0.006     BNP  Recent Labs   Lab 06/25/19  1643   *     UA  Recent Labs   Lab 06/25/19  1734   COLORU  Yellow   SPECGRAV 1.010   PHUR 5.0   PROTEINUA Negative   NITRITE Negative   LEUKOCYTESUR Negative       LAST HbA1c  Lab Results   Component Value Date    HGBA1C 7.1 (H) 05/23/2019       ASSESSMENT/PLAN:   63yoM with multiple comorbidities with extrahepatic PV occlusion plus mild cirrhosis with CRI and mild CHF contributing to recalcitrant ascites plus non-compliance with sodium/fluids as well as fatigue due to hypokalemia and fluid overload, now s/p paracentesis, transhepatic portal venogram, pressure measurements, and venoplasty with IR 6/28/19.       -diabetic 2000cal diet, cardiac diet, 2g Na diet with <1L fluid restritiction  -IV clindamycin, transition to PO prior to discharge  -continue renal-dosed non-titrating dopamine 2mcg/kg/min  -continue 1200mg IV mucomyst BID  -replete electrolytes prn  -lasix and aldactone for diuresis  -strict I&Os  -wound care consult for weeping BLE lymphedema, appreciate recs  -elevate BLE  -SCDs bilat for DVT ppx  -Pepcid for GI ppx    Liliane Churchill MD  PGY-2  [facility]  07/02/2019

## 2019-07-03 LAB
ALBUMIN SERPL BCP-MCNC: 3.2 G/DL (ref 3.5–5.2)
ALP SERPL-CCNC: 66 U/L (ref 55–135)
ALT SERPL W/O P-5'-P-CCNC: 16 U/L (ref 10–44)
ANION GAP SERPL CALC-SCNC: 6 MMOL/L (ref 8–16)
ANION GAP SERPL CALC-SCNC: 8 MMOL/L (ref 8–16)
AST SERPL-CCNC: 13 U/L (ref 10–40)
BASOPHILS # BLD AUTO: 0.02 K/UL (ref 0–0.2)
BASOPHILS NFR BLD: 0.3 % (ref 0–1.9)
BILIRUB SERPL-MCNC: 0.5 MG/DL (ref 0.1–1)
BUN SERPL-MCNC: 44 MG/DL (ref 8–23)
BUN SERPL-MCNC: 44 MG/DL (ref 8–23)
CALCIUM SERPL-MCNC: 8.2 MG/DL (ref 8.7–10.5)
CALCIUM SERPL-MCNC: 8.5 MG/DL (ref 8.7–10.5)
CHLORIDE SERPL-SCNC: 103 MMOL/L (ref 95–110)
CHLORIDE SERPL-SCNC: 104 MMOL/L (ref 95–110)
CO2 SERPL-SCNC: 25 MMOL/L (ref 23–29)
CO2 SERPL-SCNC: 27 MMOL/L (ref 23–29)
CREAT SERPL-MCNC: 4.2 MG/DL (ref 0.5–1.4)
CREAT SERPL-MCNC: 4.4 MG/DL (ref 0.5–1.4)
DIFFERENTIAL METHOD: ABNORMAL
EOSINOPHIL # BLD AUTO: 0.2 K/UL (ref 0–0.5)
EOSINOPHIL NFR BLD: 2.7 % (ref 0–8)
ERYTHROCYTE [DISTWIDTH] IN BLOOD BY AUTOMATED COUNT: 14.6 % (ref 11.5–14.5)
EST. GFR  (AFRICAN AMERICAN): 15 ML/MIN/1.73 M^2
EST. GFR  (AFRICAN AMERICAN): 16 ML/MIN/1.73 M^2
EST. GFR  (NON AFRICAN AMERICAN): 13 ML/MIN/1.73 M^2
EST. GFR  (NON AFRICAN AMERICAN): 14 ML/MIN/1.73 M^2
GLUCOSE SERPL-MCNC: 147 MG/DL (ref 70–110)
GLUCOSE SERPL-MCNC: 46 MG/DL (ref 70–110)
HCT VFR BLD AUTO: 24.6 % (ref 40–54)
HGB BLD-MCNC: 7.8 G/DL (ref 14–18)
INR PPP: 1.2 (ref 0.8–1.2)
LYMPHOCYTES # BLD AUTO: 2.4 K/UL (ref 1–4.8)
LYMPHOCYTES NFR BLD: 34.6 % (ref 18–48)
MAGNESIUM SERPL-MCNC: 2 MG/DL (ref 1.6–2.6)
MCH RBC QN AUTO: 28 PG (ref 27–31)
MCHC RBC AUTO-ENTMCNC: 31.7 G/DL (ref 32–36)
MCV RBC AUTO: 88 FL (ref 82–98)
MONOCYTES # BLD AUTO: 0.4 K/UL (ref 0.3–1)
MONOCYTES NFR BLD: 6.1 % (ref 4–15)
NEUTROPHILS # BLD AUTO: 3.8 K/UL (ref 1.8–7.7)
NEUTROPHILS NFR BLD: 56.3 % (ref 38–73)
PHOSPHATE SERPL-MCNC: 4.5 MG/DL (ref 2.7–4.5)
PLATELET # BLD AUTO: 269 K/UL (ref 150–350)
PMV BLD AUTO: 8.4 FL (ref 9.2–12.9)
POCT GLUCOSE: 103 MG/DL (ref 70–110)
POCT GLUCOSE: 104 MG/DL (ref 70–110)
POCT GLUCOSE: 116 MG/DL (ref 70–110)
POCT GLUCOSE: 166 MG/DL (ref 70–110)
POCT GLUCOSE: 174 MG/DL (ref 70–110)
POCT GLUCOSE: 38 MG/DL (ref 70–110)
POTASSIUM SERPL-SCNC: 5.2 MMOL/L (ref 3.5–5.1)
POTASSIUM SERPL-SCNC: 5.4 MMOL/L (ref 3.5–5.1)
PROT SERPL-MCNC: 4.8 G/DL (ref 6–8.4)
PROTHROMBIN TIME: 12.1 SEC (ref 9–12.5)
RBC # BLD AUTO: 2.79 M/UL (ref 4.6–6.2)
SODIUM SERPL-SCNC: 136 MMOL/L (ref 136–145)
SODIUM SERPL-SCNC: 137 MMOL/L (ref 136–145)
WBC # BLD AUTO: 6.93 K/UL (ref 3.9–12.7)

## 2019-07-03 PROCEDURE — 80053 COMPREHEN METABOLIC PANEL: CPT

## 2019-07-03 PROCEDURE — 63600175 PHARM REV CODE 636 W HCPCS: Performed by: SURGERY

## 2019-07-03 PROCEDURE — 80048 BASIC METABOLIC PNL TOTAL CA: CPT

## 2019-07-03 PROCEDURE — S5571 INSULIN DISPOS PEN 3 ML: HCPCS | Performed by: STUDENT IN AN ORGANIZED HEALTH CARE EDUCATION/TRAINING PROGRAM

## 2019-07-03 PROCEDURE — P9047 ALBUMIN (HUMAN), 25%, 50ML: HCPCS | Mod: JG | Performed by: STUDENT IN AN ORGANIZED HEALTH CARE EDUCATION/TRAINING PROGRAM

## 2019-07-03 PROCEDURE — 63600175 PHARM REV CODE 636 W HCPCS: Performed by: STUDENT IN AN ORGANIZED HEALTH CARE EDUCATION/TRAINING PROGRAM

## 2019-07-03 PROCEDURE — 63600175 PHARM REV CODE 636 W HCPCS

## 2019-07-03 PROCEDURE — 85610 PROTHROMBIN TIME: CPT

## 2019-07-03 PROCEDURE — 93010 EKG 12-LEAD: ICD-10-PCS | Mod: ,,, | Performed by: INTERNAL MEDICINE

## 2019-07-03 PROCEDURE — 25000003 PHARM REV CODE 250: Performed by: SURGERY

## 2019-07-03 PROCEDURE — S0171 BUMETANIDE 0.5 MG: HCPCS | Performed by: STUDENT IN AN ORGANIZED HEALTH CARE EDUCATION/TRAINING PROGRAM

## 2019-07-03 PROCEDURE — 25000003 PHARM REV CODE 250

## 2019-07-03 PROCEDURE — 85025 COMPLETE CBC W/AUTO DIFF WBC: CPT

## 2019-07-03 PROCEDURE — S0077 INJECTION, CLINDAMYCIN PHOSP: HCPCS

## 2019-07-03 PROCEDURE — B4185 PARENTERAL SOL 10 GM LIPIDS: HCPCS | Performed by: SURGERY

## 2019-07-03 PROCEDURE — S0028 INJECTION, FAMOTIDINE, 20 MG: HCPCS | Performed by: SURGERY

## 2019-07-03 PROCEDURE — 25000003 PHARM REV CODE 250: Performed by: STUDENT IN AN ORGANIZED HEALTH CARE EDUCATION/TRAINING PROGRAM

## 2019-07-03 PROCEDURE — 94761 N-INVAS EAR/PLS OXIMETRY MLT: CPT

## 2019-07-03 PROCEDURE — 84100 ASSAY OF PHOSPHORUS: CPT

## 2019-07-03 PROCEDURE — 83735 ASSAY OF MAGNESIUM: CPT

## 2019-07-03 PROCEDURE — 93010 ELECTROCARDIOGRAM REPORT: CPT | Mod: ,,, | Performed by: INTERNAL MEDICINE

## 2019-07-03 PROCEDURE — 20000000 HC ICU ROOM

## 2019-07-03 PROCEDURE — 93005 ELECTROCARDIOGRAM TRACING: CPT

## 2019-07-03 RX ORDER — DEXTROSE 50 % IN WATER (D50W) INTRAVENOUS SYRINGE
Status: COMPLETED
Start: 2019-07-03 | End: 2019-07-03

## 2019-07-03 RX ORDER — BUMETANIDE 0.25 MG/ML
1 INJECTION INTRAMUSCULAR; INTRAVENOUS ONCE
Status: COMPLETED | OUTPATIENT
Start: 2019-07-03 | End: 2019-07-03

## 2019-07-03 RX ORDER — LACTULOSE 10 G/15ML
20 SOLUTION ORAL ONCE
Status: COMPLETED | OUTPATIENT
Start: 2019-07-03 | End: 2019-07-03

## 2019-07-03 RX ORDER — MIDODRINE HYDROCHLORIDE 2.5 MG/1
2.5 TABLET ORAL
Status: DISCONTINUED | OUTPATIENT
Start: 2019-07-03 | End: 2019-07-15 | Stop reason: HOSPADM

## 2019-07-03 RX ORDER — MIDODRINE HYDROCHLORIDE 5 MG/1
10 TABLET ORAL
Status: DISCONTINUED | OUTPATIENT
Start: 2019-07-03 | End: 2019-07-03 | Stop reason: DRUGHIGH

## 2019-07-03 RX ADMIN — I.V. FAT EMULSION 250 ML: 20 EMULSION INTRAVENOUS at 09:07

## 2019-07-03 RX ADMIN — FAMOTIDINE 20 MG: 10 INJECTION, SOLUTION INTRAVENOUS at 11:07

## 2019-07-03 RX ADMIN — MIDODRINE HYDROCHLORIDE 2.5 MG: 2.5 TABLET ORAL at 12:07

## 2019-07-03 RX ADMIN — INSULIN ASPART 4 UNITS: 100 INJECTION, SOLUTION INTRAVENOUS; SUBCUTANEOUS at 05:07

## 2019-07-03 RX ADMIN — CLINDAMYCIN IN 5 PERCENT DEXTROSE 600 MG: 12 INJECTION, SOLUTION INTRAVENOUS at 06:07

## 2019-07-03 RX ADMIN — CYANOCOBALAMIN 1000 MCG: 1000 INJECTION, SOLUTION INTRAMUSCULAR at 10:07

## 2019-07-03 RX ADMIN — SODIUM CHLORIDE: 0.9 INJECTION, SOLUTION INTRAVENOUS at 12:07

## 2019-07-03 RX ADMIN — ALBUMIN (HUMAN) 12.5 G: 12.5 SOLUTION INTRAVENOUS at 06:07

## 2019-07-03 RX ADMIN — BUMETANIDE 1 MG: 0.25 INJECTION INTRAMUSCULAR; INTRAVENOUS at 06:07

## 2019-07-03 RX ADMIN — HEPARIN SODIUM 5000 UNITS: 5000 INJECTION, SOLUTION INTRAVENOUS; SUBCUTANEOUS at 09:07

## 2019-07-03 RX ADMIN — HEPARIN SODIUM 5000 UNITS: 5000 INJECTION, SOLUTION INTRAVENOUS; SUBCUTANEOUS at 01:07

## 2019-07-03 RX ADMIN — ASCORBIC ACID, VITAMIN A PALMITATE, CHOLECALCIFEROL, THIAMINE HYDROCHLORIDE, RIBOFLAVIN-5 PHOSPHATE SODIUM, PYRIDOXINE HYDROCHLORIDE, NIACINAMIDE, DEXPANTHENOL, ALPHA-TOCOPHEROL ACETATE, VITAMIN K1, FOLIC ACID, BIOTIN, CYANOCOBALAMIN: 200; 3300; 200; 6; 3.6; 6; 40; 15; 10; 150; 600; 60; 5 INJECTION, SOLUTION INTRAVENOUS at 12:07

## 2019-07-03 RX ADMIN — IRON SUCROSE 100 MG: 20 INJECTION, SOLUTION INTRAVENOUS at 07:07

## 2019-07-03 RX ADMIN — EPOETIN ALFA-EPBX 11600 UNITS: 10000 INJECTION, SOLUTION INTRAVENOUS; SUBCUTANEOUS at 06:07

## 2019-07-03 RX ADMIN — HEPARIN SODIUM 5000 UNITS: 5000 INJECTION, SOLUTION INTRAVENOUS; SUBCUTANEOUS at 06:07

## 2019-07-03 RX ADMIN — ACETYLCYSTEINE: 200 INJECTION, SOLUTION INTRAVENOUS at 09:07

## 2019-07-03 RX ADMIN — TAMSULOSIN HYDROCHLORIDE 0.4 MG: 0.4 CAPSULE ORAL at 10:07

## 2019-07-03 RX ADMIN — SODIUM CHLORIDE 250 ML: 0.9 INJECTION, SOLUTION INTRAVENOUS at 08:07

## 2019-07-03 RX ADMIN — CLINDAMYCIN IN 5 PERCENT DEXTROSE 600 MG: 12 INJECTION, SOLUTION INTRAVENOUS at 01:07

## 2019-07-03 RX ADMIN — LACTULOSE 20 G: 20 SOLUTION ORAL at 06:07

## 2019-07-03 RX ADMIN — ALBUMIN (HUMAN) 12.5 G: 12.5 SOLUTION INTRAVENOUS at 05:07

## 2019-07-03 RX ADMIN — CLINDAMYCIN IN 5 PERCENT DEXTROSE 600 MG: 12 INJECTION, SOLUTION INTRAVENOUS at 09:07

## 2019-07-03 RX ADMIN — ALBUMIN (HUMAN) 12.5 G: 12.5 SOLUTION INTRAVENOUS at 12:07

## 2019-07-03 RX ADMIN — INSULIN DETEMIR 10 UNITS: 100 INJECTION, SOLUTION SUBCUTANEOUS at 09:07

## 2019-07-03 RX ADMIN — PANCRELIPASE 2 CAPSULE: 30000; 6000; 19000 CAPSULE, DELAYED RELEASE PELLETS ORAL at 05:07

## 2019-07-03 RX ADMIN — DOPAMINE HYDROCHLORIDE IN DEXTROSE 2 MCG/KG/MIN: 1.6 INJECTION, SOLUTION INTRAVENOUS at 12:07

## 2019-07-03 RX ADMIN — ONDANSETRON 4 MG: 2 INJECTION INTRAMUSCULAR; INTRAVENOUS at 08:07

## 2019-07-03 RX ADMIN — ACETYLCYSTEINE: 200 INJECTION, SOLUTION INTRAVENOUS at 10:07

## 2019-07-03 RX ADMIN — ALBUMIN (HUMAN) 12.5 G: 12.5 SOLUTION INTRAVENOUS at 03:07

## 2019-07-03 RX ADMIN — DEXTROSE MONOHYDRATE 50 ML: 500 INJECTION PARENTERAL at 07:07

## 2019-07-03 RX ADMIN — MIDODRINE HYDROCHLORIDE 2.5 MG: 2.5 TABLET ORAL at 05:07

## 2019-07-03 RX ADMIN — ALBUMIN (HUMAN) 12.5 G: 12.5 SOLUTION INTRAVENOUS at 11:07

## 2019-07-03 RX ADMIN — PANCRELIPASE 1 CAPSULE: 24000; 76000; 120000 CAPSULE, DELAYED RELEASE PELLETS ORAL at 05:07

## 2019-07-03 RX ADMIN — ATORVASTATIN CALCIUM 40 MG: 40 TABLET, FILM COATED ORAL at 10:07

## 2019-07-03 NOTE — NURSING
Report given to FABIANO Escamilla -- ICU. Patient transported to ICU via bed, accompanied by nurse. Accepting nurse, Francine at bedside.

## 2019-07-03 NOTE — PT/OT/SLP PROGRESS
Occupational Therapy  Hold OT    Patient Name:  Jian Arrieta   MRN:  [id number]    Patient not seen today secondary to t/f to ICU and will need re-eval when appropriate. Will follow-up.    AFIA Melendez  7/3/2019

## 2019-07-03 NOTE — NURSING
Blood sugar checked 38 mg/dl. Lab critical result received. Glucose 46 mg /dl. Blood pressure checked 81/49 (Manual BP) MET called at 07:34 hrs.

## 2019-07-03 NOTE — PT/OT/SLP PROGRESS
Physical Therapy      Patient Name:  Jian Arrieta   MRN:  [id number]    Patient not seen today secondary to (MET called,nsg hold). Will follow-up tomorrow.    Thom Reyes, PTA

## 2019-07-03 NOTE — PROGRESS NOTES
V/O Dr Churchill Decrease dose Midodrine to 2.5 mg po tid based on renal function(SCr 4.2, CrCl 21.8 ml/min)

## 2019-07-03 NOTE — PLAN OF CARE
Problem: Adult Inpatient Plan of Care  Goal: Plan of Care Review  Outcome: Ongoing (interventions implemented as appropriate)     07/03/19 0640   Plan of Care Review   Plan of Care Reviewed With patient   Mr. Leavitt is AAO X 4. Denies pain at present. Remains on Dopamine, HAS and IV fluids. IV antibiotics given as documented. Blood sugar stable. On tele monitor shows NSR with PVC's. Slept well during night. Safety and comfort measures met. Will continue to monitor patient.

## 2019-07-03 NOTE — PROGRESS NOTES
Progress Note    Admit Date: 6/27/2019   LOS: 6 days     SUBJECTIVE:   NAEON.  No N/V. VSS, afebrile. Tolerating diet, but did not eat much dinner last night. Has ambulated to bedside commode.      Scheduled Meds:   custom IVPB builder   Intravenous BID    atorvastatin  40 mg Oral Daily    clindamycin 600 MG/50 ML D5W  600 mg Intravenous Q8H    cyanocobalamin  1,000 mcg Subcutaneous Daily    epoetin vladimir-ebpx (RETACRIT) injection  100 Units/kg Subcutaneous Daily    famotidine (PF)  20 mg Intravenous Daily    heparin (porcine)  5,000 Units Subcutaneous Q8H    insulin aspart U-100  4 Units Subcutaneous TIDWM    insulin detemir U-100  14 Units Subcutaneous QHS    iron sucrose  100 mg Intravenous QAM    lidocaine (PF) 10 mg/ml (1%)  1 mL Other Once    lipase-protease-amylase 24,000-76,000-120,000 units  1 capsule Oral TID WM    And    lipase-protease-amylase 6,000-19,000-30,000 units  2 capsule Oral TID WM    metoprolol succinate  50 mg Oral Daily    tamsulosin  1 capsule Oral Daily     Continuous Infusions:   sodium chloride 0.9% 50 mL/hr at 07/02/19 1824    albumin human 25% 12.5 g (07/03/19 0615)    DOPamine 2 mcg/kg/min (07/02/19 0916)     PRN Meds:acetaminophen, albuterol-ipratropium, Dextrose 10% Bolus, gelatin adsorbable 100cm top sponge, glucagon (human recombinant), insulin aspart U-100, lidocaine HCL 10 mg/ml (1%), ondansetron, ramelteon, senna-docusate 8.6-50 mg, sodium chloride 0.9%, traMADol, traMADol    Review of patient's allergies indicates:  No Known Allergies    OBJECTIVE:     Vital Signs (Most Recent)  Temp: 98.3 °F (36.8 °C) (07/03/19 0458)  Pulse: 104 (07/03/19 0458)  Resp: 20 (07/03/19 0458)  BP: 117/60 (07/03/19 0458)  SpO2: 98 % (07/03/19 0026)    Vital Signs Range (Last 24H):  Temp:  [97.9 °F (36.6 °C)-98.3 °F (36.8 °C)]   Pulse:  []   Resp:  [17-20]   BP: ()/(56-63)   SpO2:  [97 %-98 %]     I & O (Last 24H):    Intake/Output Summary (Last 24 hours) at 7/3/2019  0713  Last data filed at 7/3/2019 0615  Gross per 24 hour   Intake 1553.88 ml   Output 2175 ml   Net -621.12 ml     Physical Exam:  NAD, AAO  Morbidly obese  RRR  Non-labored breathing, good respiratory effort  abd soft, obese, non-tender, no rebound, no guarding, no peritoneal signs  BLE lymphedema with some weeping noted to R-shin, dressing covering weeping C/D/I. Dressing over L-shin also C/D/I.      LABS  CBC  Recent Labs   Lab 07/01/19 0440 07/02/19 0440 07/03/19  0532   WBC 5.18 5.34 6.93   RBC 2.54* 2.47* 2.79*   HGB 7.2* 7.1* 7.8*   HCT 22.4* 21.8* 24.6*    213 269   MCV 88 88 88   MCH 28.3 28.7 28.0   MCHC 32.1 32.6 31.7*     CMP  Recent Labs   Lab 06/30/19  0426 07/01/19 0440 07/02/19  0440   * 175* 135*   CALCIUM 7.8* 8.0* 8.3*   ALBUMIN 2.3* 2.5* 2.8*   PROT 4.2* 4.2* 4.5*   * 135* 136   K 3.6 4.1 4.7   CO2 28 27 27   CL 99 101 102   BUN 32* 37* 42*   CREATININE 2.3* 3.0* 3.6*   ALKPHOS 87 80 68   ALT 21 16 14   AST 16 16 20   BILITOT 0.4 0.4 0.4       Recent Labs   Lab 06/30/19  0426 07/01/19 0440 07/02/19  0440   CALCIUM 7.8* 8.0* 8.3*   MG 1.8 1.8 1.9   PHOS 3.8 4.5 4.7*         POCT-Glucose  POCT Glucose   Date Value Ref Range Status   07/03/2019 104 70 - 110 mg/dL Final   07/02/2019 173 (H) 70 - 110 mg/dL Final   07/02/2019 254 (H) 70 - 110 mg/dL Final   07/02/2019 89 70 - 110 mg/dL Final   07/02/2019 159 (H) 70 - 110 mg/dL Final   07/01/2019 208 (H) 70 - 110 mg/dL Final   07/01/2019 300 (H) 70 - 110 mg/dL Final   07/01/2019 233 (H) 70 - 110 mg/dL Final   07/01/2019 200 (H) 70 - 110 mg/dL Final   06/30/2019 204 (H) 70 - 110 mg/dL Final   06/30/2019 232 (H) 70 - 110 mg/dL Final   06/30/2019 259 (H) 70 - 110 mg/dL Final       [facility]  Recent Labs   Lab 07/01/19  0440 07/02/19  0440 07/03/19  0532   INR 1.2 1.2 1.2       CE  Recent Labs   Lab 06/27/19  2213 07/01/19  1300   TROPONINI 0.006 <0.006     BNP  No results for input(s): BNP in the last 168 hours.  UA  No results for  input(s): COLORU, CLARITYU, SPECGRAV, PHUR, PROTEINUA, GLUCOSEU, BILIRUBINCON, BLOODU, WBCU, RBCU, BACTERIA, MUCUS, NITRITE, LEUKOCYTESUR, UROBILINOGEN, HYALINECASTS in the last 168 hours.    LAST HbA1c  Lab Results   Component Value Date    HGBA1C 7.1 (H) 05/23/2019       ASSESSMENT/PLAN:   63yoM with multiple comorbidities with extrahepatic PV occlusion plus mild cirrhosis with CRI and mild CHF contributing to recalcitrant ascites plus non-compliance with sodium/fluids as well as fatigue due to hypokalemia and fluid overload, now s/p paracentesis, transhepatic portal venogram, pressure measurements, and venoplasty with IR 6/28/19.       -diabetic 2000cal diet, cardiac diet, 2g Na diet with <1L fluid restriction  -IV clindamycin, transition to PO prior to discharge  -continue renal-dosed non-titrating dopamine 2mcg/kg/min  -Started IVFs at 50ml/hr for elevated creatinine  -continue 1200mg IV mucomyst BID  -replete electrolytes prn  -lasix and aldactone for diuresis  -strict I&Os  -wound care consult for weeping BLE lymphedema, appreciate recs  -elevate BLE  -SCDs bilat for DVT ppx  -Pepcid for GI ppx    Liliane Churchill MD  PGY-2  [facility]  07/03/2019

## 2019-07-03 NOTE — SIGNIFICANT EVENT
MET called at approximately 745am for hypotension and hypoglycemia to 81/49 and 38 respectively. Patient given amp of glucose and repeat BS ws 116. EKG performed at bedside showed sinus tachycardia with PVCs and bigeminy. Sats 98% RA. Patient given 250ml bolus of NS with appropriate response to 94/53. Patient also given orange juice during MET. Patient awake and alert during entire MET, denied SOB, Dizziness, CP. States he did not eat much dinner last night secondary to dislike of the food.     New orders placed after MET:  Midodrine 10mg TID  Decreased insulin nightly from 14 units to 10 units  Encourage patient to eat more of his meals, may need more sliding scale insulin rather than scheduled if he continues to have decreased oral intake.     Liliane Churchill MD  PGY-2  [facility]  07/03/2019

## 2019-07-04 LAB
ALBUMIN SERPL BCP-MCNC: 3.3 G/DL (ref 3.5–5.2)
ALP SERPL-CCNC: 69 U/L (ref 55–135)
ALT SERPL W/O P-5'-P-CCNC: 15 U/L (ref 10–44)
ANION GAP SERPL CALC-SCNC: 8 MMOL/L (ref 8–16)
AST SERPL-CCNC: 14 U/L (ref 10–40)
BASOPHILS # BLD AUTO: 0.03 K/UL (ref 0–0.2)
BASOPHILS NFR BLD: 0.7 % (ref 0–1.9)
BILIRUB SERPL-MCNC: 0.4 MG/DL (ref 0.1–1)
BUN SERPL-MCNC: 45 MG/DL (ref 8–23)
CALCIUM SERPL-MCNC: 8.6 MG/DL (ref 8.7–10.5)
CHLORIDE SERPL-SCNC: 102 MMOL/L (ref 95–110)
CO2 SERPL-SCNC: 25 MMOL/L (ref 23–29)
CREAT SERPL-MCNC: 4.6 MG/DL (ref 0.5–1.4)
DIFFERENTIAL METHOD: ABNORMAL
EOSINOPHIL # BLD AUTO: 0.1 K/UL (ref 0–0.5)
EOSINOPHIL NFR BLD: 2.4 % (ref 0–8)
ERYTHROCYTE [DISTWIDTH] IN BLOOD BY AUTOMATED COUNT: 14.9 % (ref 11.5–14.5)
EST. GFR  (AFRICAN AMERICAN): 14 ML/MIN/1.73 M^2
EST. GFR  (NON AFRICAN AMERICAN): 13 ML/MIN/1.73 M^2
GLUCOSE SERPL-MCNC: 241 MG/DL (ref 70–110)
HCT VFR BLD AUTO: 22.4 % (ref 40–54)
HGB BLD-MCNC: 7.2 G/DL (ref 14–18)
INR PPP: 1.2 (ref 0.8–1.2)
LYMPHOCYTES # BLD AUTO: 1.1 K/UL (ref 1–4.8)
LYMPHOCYTES NFR BLD: 23.3 % (ref 18–48)
MAGNESIUM SERPL-MCNC: 2.2 MG/DL (ref 1.6–2.6)
MCH RBC QN AUTO: 28.9 PG (ref 27–31)
MCHC RBC AUTO-ENTMCNC: 32.1 G/DL (ref 32–36)
MCV RBC AUTO: 90 FL (ref 82–98)
MONOCYTES # BLD AUTO: 0.3 K/UL (ref 0.3–1)
MONOCYTES NFR BLD: 6.9 % (ref 4–15)
NEUTROPHILS # BLD AUTO: 3 K/UL (ref 1.8–7.7)
NEUTROPHILS NFR BLD: 66.7 % (ref 38–73)
PHOSPHATE SERPL-MCNC: 5.5 MG/DL (ref 2.7–4.5)
PLATELET # BLD AUTO: 229 K/UL (ref 150–350)
PMV BLD AUTO: 8.5 FL (ref 9.2–12.9)
POCT GLUCOSE: 237 MG/DL (ref 70–110)
POCT GLUCOSE: 254 MG/DL (ref 70–110)
POCT GLUCOSE: 261 MG/DL (ref 70–110)
POCT GLUCOSE: 317 MG/DL (ref 70–110)
POCT GLUCOSE: 365 MG/DL (ref 70–110)
POTASSIUM SERPL-SCNC: 5.2 MMOL/L (ref 3.5–5.1)
PROT SERPL-MCNC: 4.7 G/DL (ref 6–8.4)
PROTHROMBIN TIME: 12 SEC (ref 9–12.5)
RBC # BLD AUTO: 2.49 M/UL (ref 4.6–6.2)
SODIUM SERPL-SCNC: 135 MMOL/L (ref 136–145)
TROPONIN I SERPL DL<=0.01 NG/ML-MCNC: <0.006 NG/ML (ref 0–0.03)
WBC # BLD AUTO: 4.5 K/UL (ref 3.9–12.7)

## 2019-07-04 PROCEDURE — 25000003 PHARM REV CODE 250

## 2019-07-04 PROCEDURE — 83735 ASSAY OF MAGNESIUM: CPT

## 2019-07-04 PROCEDURE — 25000003 PHARM REV CODE 250: Performed by: SURGERY

## 2019-07-04 PROCEDURE — 84100 ASSAY OF PHOSPHORUS: CPT

## 2019-07-04 PROCEDURE — 97802 MEDICAL NUTRITION INDIV IN: CPT | Performed by: DIETITIAN, REGISTERED

## 2019-07-04 PROCEDURE — 63600175 PHARM REV CODE 636 W HCPCS

## 2019-07-04 PROCEDURE — 80053 COMPREHEN METABOLIC PANEL: CPT

## 2019-07-04 PROCEDURE — 20000000 HC ICU ROOM

## 2019-07-04 PROCEDURE — 97168 OT RE-EVAL EST PLAN CARE: CPT

## 2019-07-04 PROCEDURE — 97530 THERAPEUTIC ACTIVITIES: CPT

## 2019-07-04 PROCEDURE — 97164 PT RE-EVAL EST PLAN CARE: CPT

## 2019-07-04 PROCEDURE — 93010 EKG 12-LEAD: ICD-10-PCS | Mod: ,,, | Performed by: INTERNAL MEDICINE

## 2019-07-04 PROCEDURE — 93010 ELECTROCARDIOGRAM REPORT: CPT | Mod: ,,, | Performed by: INTERNAL MEDICINE

## 2019-07-04 PROCEDURE — 63600175 PHARM REV CODE 636 W HCPCS: Performed by: SURGERY

## 2019-07-04 PROCEDURE — P9047 ALBUMIN (HUMAN), 25%, 50ML: HCPCS | Mod: JG | Performed by: STUDENT IN AN ORGANIZED HEALTH CARE EDUCATION/TRAINING PROGRAM

## 2019-07-04 PROCEDURE — 85025 COMPLETE CBC W/AUTO DIFF WBC: CPT

## 2019-07-04 PROCEDURE — 25000003 PHARM REV CODE 250: Performed by: STUDENT IN AN ORGANIZED HEALTH CARE EDUCATION/TRAINING PROGRAM

## 2019-07-04 PROCEDURE — S0028 INJECTION, FAMOTIDINE, 20 MG: HCPCS | Performed by: SURGERY

## 2019-07-04 PROCEDURE — 84484 ASSAY OF TROPONIN QUANT: CPT

## 2019-07-04 PROCEDURE — 93005 ELECTROCARDIOGRAM TRACING: CPT

## 2019-07-04 PROCEDURE — 85610 PROTHROMBIN TIME: CPT

## 2019-07-04 PROCEDURE — 63600175 PHARM REV CODE 636 W HCPCS: Performed by: STUDENT IN AN ORGANIZED HEALTH CARE EDUCATION/TRAINING PROGRAM

## 2019-07-04 PROCEDURE — 97535 SELF CARE MNGMENT TRAINING: CPT

## 2019-07-04 PROCEDURE — S0077 INJECTION, CLINDAMYCIN PHOSP: HCPCS

## 2019-07-04 PROCEDURE — 94761 N-INVAS EAR/PLS OXIMETRY MLT: CPT

## 2019-07-04 RX ORDER — METOPROLOL SUCCINATE 25 MG/1
25 TABLET, EXTENDED RELEASE ORAL DAILY
Status: DISCONTINUED | OUTPATIENT
Start: 2019-07-04 | End: 2019-07-15 | Stop reason: HOSPADM

## 2019-07-04 RX ORDER — NITROGLYCERIN 0.4 MG/1
0.4 TABLET SUBLINGUAL EVERY 5 MIN PRN
Status: COMPLETED | OUTPATIENT
Start: 2019-07-04 | End: 2019-07-05

## 2019-07-04 RX ADMIN — MIDODRINE HYDROCHLORIDE 2.5 MG: 2.5 TABLET ORAL at 11:07

## 2019-07-04 RX ADMIN — CLINDAMYCIN IN 5 PERCENT DEXTROSE 600 MG: 12 INJECTION, SOLUTION INTRAVENOUS at 09:07

## 2019-07-04 RX ADMIN — EPOETIN ALFA-EPBX 11600 UNITS: 10000 INJECTION, SOLUTION INTRAVENOUS; SUBCUTANEOUS at 08:07

## 2019-07-04 RX ADMIN — RAMELTEON 8 MG: 8 TABLET, FILM COATED ORAL at 09:07

## 2019-07-04 RX ADMIN — ALBUMIN (HUMAN) 12.5 G: 12.5 SOLUTION INTRAVENOUS at 10:07

## 2019-07-04 RX ADMIN — METOPROLOL SUCCINATE 25 MG: 25 TABLET, FILM COATED, EXTENDED RELEASE ORAL at 08:07

## 2019-07-04 RX ADMIN — PANCRELIPASE 2 CAPSULE: 30000; 6000; 19000 CAPSULE, DELAYED RELEASE PELLETS ORAL at 11:07

## 2019-07-04 RX ADMIN — INSULIN ASPART 4 UNITS: 100 INJECTION, SOLUTION INTRAVENOUS; SUBCUTANEOUS at 04:07

## 2019-07-04 RX ADMIN — MIDODRINE HYDROCHLORIDE 2.5 MG: 2.5 TABLET ORAL at 08:07

## 2019-07-04 RX ADMIN — PANCRELIPASE 1 CAPSULE: 24000; 76000; 120000 CAPSULE, DELAYED RELEASE PELLETS ORAL at 04:07

## 2019-07-04 RX ADMIN — MIDODRINE HYDROCHLORIDE 2.5 MG: 2.5 TABLET ORAL at 04:07

## 2019-07-04 RX ADMIN — ATORVASTATIN CALCIUM 40 MG: 40 TABLET, FILM COATED ORAL at 08:07

## 2019-07-04 RX ADMIN — ALBUMIN (HUMAN) 12.5 G: 12.5 SOLUTION INTRAVENOUS at 05:07

## 2019-07-04 RX ADMIN — HEPARIN SODIUM 5000 UNITS: 5000 INJECTION, SOLUTION INTRAVENOUS; SUBCUTANEOUS at 09:07

## 2019-07-04 RX ADMIN — CLINDAMYCIN IN 5 PERCENT DEXTROSE 600 MG: 12 INJECTION, SOLUTION INTRAVENOUS at 05:07

## 2019-07-04 RX ADMIN — HEPARIN SODIUM 5000 UNITS: 5000 INJECTION, SOLUTION INTRAVENOUS; SUBCUTANEOUS at 02:07

## 2019-07-04 RX ADMIN — INSULIN DETEMIR 10 UNITS: 100 INJECTION, SOLUTION SUBCUTANEOUS at 09:07

## 2019-07-04 RX ADMIN — ALBUMIN (HUMAN) 12.5 G: 12.5 SOLUTION INTRAVENOUS at 02:07

## 2019-07-04 RX ADMIN — PANCRELIPASE 2 CAPSULE: 30000; 6000; 19000 CAPSULE, DELAYED RELEASE PELLETS ORAL at 04:07

## 2019-07-04 RX ADMIN — CYANOCOBALAMIN 1000 MCG: 1000 INJECTION, SOLUTION INTRAMUSCULAR at 08:07

## 2019-07-04 RX ADMIN — PANCRELIPASE 1 CAPSULE: 24000; 76000; 120000 CAPSULE, DELAYED RELEASE PELLETS ORAL at 08:07

## 2019-07-04 RX ADMIN — NITROGLYCERIN 0.4 MG: 0.4 TABLET, ORALLY DISINTEGRATING SUBLINGUAL at 09:07

## 2019-07-04 RX ADMIN — PANCRELIPASE 2 CAPSULE: 30000; 6000; 19000 CAPSULE, DELAYED RELEASE PELLETS ORAL at 08:07

## 2019-07-04 RX ADMIN — HEPARIN SODIUM 5000 UNITS: 5000 INJECTION, SOLUTION INTRAVENOUS; SUBCUTANEOUS at 05:07

## 2019-07-04 RX ADMIN — IRON SUCROSE 100 MG: 20 INJECTION, SOLUTION INTRAVENOUS at 08:07

## 2019-07-04 RX ADMIN — ALBUMIN (HUMAN) 12.5 G: 12.5 SOLUTION INTRAVENOUS at 09:07

## 2019-07-04 RX ADMIN — FAMOTIDINE 20 MG: 10 INJECTION, SOLUTION INTRAVENOUS at 08:07

## 2019-07-04 RX ADMIN — ACETYLCYSTEINE: 200 INJECTION, SOLUTION INTRAVENOUS at 08:07

## 2019-07-04 RX ADMIN — ASCORBIC ACID, VITAMIN A PALMITATE, CHOLECALCIFEROL, THIAMINE HYDROCHLORIDE, RIBOFLAVIN-5 PHOSPHATE SODIUM, PYRIDOXINE HYDROCHLORIDE, NIACINAMIDE, DEXPANTHENOL, ALPHA-TOCOPHEROL ACETATE, VITAMIN K1, FOLIC ACID, BIOTIN, CYANOCOBALAMIN: 200; 3300; 200; 6; 3.6; 6; 40; 15; 10; 150; 600; 60; 5 INJECTION, SOLUTION INTRAVENOUS at 10:07

## 2019-07-04 RX ADMIN — DOPAMINE HYDROCHLORIDE IN DEXTROSE 2 MCG/KG/MIN: 1.6 INJECTION, SOLUTION INTRAVENOUS at 11:07

## 2019-07-04 RX ADMIN — TAMSULOSIN HYDROCHLORIDE 0.4 MG: 0.4 CAPSULE ORAL at 08:07

## 2019-07-04 RX ADMIN — INSULIN ASPART 4 UNITS: 100 INJECTION, SOLUTION INTRAVENOUS; SUBCUTANEOUS at 07:07

## 2019-07-04 RX ADMIN — INSULIN ASPART 4 UNITS: 100 INJECTION, SOLUTION INTRAVENOUS; SUBCUTANEOUS at 11:07

## 2019-07-04 RX ADMIN — CLINDAMYCIN IN 5 PERCENT DEXTROSE 600 MG: 12 INJECTION, SOLUTION INTRAVENOUS at 02:07

## 2019-07-04 RX ADMIN — PANCRELIPASE 1 CAPSULE: 24000; 76000; 120000 CAPSULE, DELAYED RELEASE PELLETS ORAL at 11:07

## 2019-07-04 NOTE — PLAN OF CARE
"Problem: Adult Inpatient Plan of Care  Goal: Plan of Care Review  Outcome: Revised  Pt VSS for shift. AAOx4. No c/o pain or SOB. Pt seems to have sleep apnea. When I asked the patient if they had a history of BERHANE, he stated he knows but doesn't treat it and stated "I gotta die from something." I continued to teach, but pt did not want to hear anymore of the conversation. Pt diuresing well with mathew. BUN and Creatnine continue to rise on labs. And H+H continuing to fall. Wound care done and foam dressing placed on all. PICC infusing through both ports. SCD on all night with no signs of breakdown. Bed in lowest position. Call light within reach. Tv on. No distress noted.   Results for FLORIAN HENDERSON (MRN [id number]) as of 7/4/2019 06:14   Ref. Range 7/4/2019 04:17   Sodium Latest Ref Range: 136 - 145 mmol/L 135 (L)   Potassium Latest Ref Range: 3.5 - 5.1 mmol/L 5.2 (H)   Chloride Latest Ref Range: 95 - 110 mmol/L 102   CO2 Latest Ref Range: 23 - 29 mmol/L 25   Anion Gap Latest Ref Range: 8 - 16 mmol/L 8   BUN, Bld Latest Ref Range: 8 - 23 mg/dL 45 (H)   Creatinine Latest Ref Range: 0.5 - 1.4 mg/dL 4.6 (H)         "

## 2019-07-04 NOTE — PROGRESS NOTES
[facility]  Adult Nutrition  Progress Note    SUMMARY       Recommendations    Recommendation:   1. REC custom TPN if goal is for long term use.  gm dex, 90 gm Pro, 50 gm Fat daily to provide 1540 calories, 90 gm Pro, 1.44 L fluids, GIR 1.2 mg/kg/min.   2. PharmD to adjust electrolytes.    3. Continue with 2000 calorie, Low Na, 2 gm K diet with 1000 mL FR        Goals: Pt to meet > 85% of calorie/protein needs   Nutrition Goal Status: new  Communication of RD Recs: reviewed with RN    Nutrition Discharge Planning: Home on 2000 calorie Renal diet     Reason for Assessment    Reason For Assessment: new TPN  Diagnosis: cardiac disease(Portal HTN)    Relevant Medical History:   Past Medical History:   Diagnosis Date    Alcohol abuse     Anasarca 1/28/2019    Arthropathy associated with neurological disorder 9/2/2015    Atherosclerosis     Charcot foot due to diabetes mellitus     Chronic combined systolic and diastolic heart failure 01/29/2019 1-28-19 Left VentricleModerate decreased ejection fraction at 30%. Normal left ventricular cavity size. Normal wall thickness observed. Grade I (mild) left ventricular diastolic dysfunction consistent with impaired relaxation. Normal left atrial pressure. Moderate, global hypokinesis(see wall scoring diagram). Right VentricleNormal cavity size, wall thickness and ejection fraction. Wall motion n    Chronic pancreatitis 1/28/2019    Colon polyps     approx 5 yrs ago    Coronary artery disease due to calcified coronary lesion 05/08/2015    5 stents on ASA      Diabetic polyneuropathy associated with type 2 diabetes mellitus 9/2/2015    Diverticulosis 1/28/2019    DM type 2 with diabetic peripheral neuropathy 2/4/2019    Encounter for blood transfusion     Essential hypertension 1/28/2019    Former smoker 8/26/2015    Healed ulcer of left foot on examination 6/20/2017    Hydrocele     approx 1.5 yrs ago    Hypoalbuminemia 2/4/2019     Lymphedema of both lower extremities 1/29/2019    Mixed hyperlipidemia 5/8/2015    Morbid obesity with BMI of 50.0-59.9, adult 5/8/2015    Onychomycosis of multiple toenails with type 2 diabetes mellitus and peripheral neuropathy 6/20/2017    Perianal cyst     approx 2 yrs ago    Pseudocyst of pancreas 1/28/2019 1-28-19 Liver has a cirrhotic morphology with no focal lesions.  Significant interval increase in ascites when compared to prior exam which may account for patient's abdominal distension.  Hypodense air-fluid collection along the body of the pancreas which is slightly smaller when compared to prior CT.  Findings may relate to pancreatic necrosis with pancreatic pseudocysts with infected pseudocyst    Skin cancer     skin cancer    Sleep apnea 8/26/2015    Status post bariatric surgery 1/11/2016    Type 2 diabetes mellitus, with long-term current use of insulin 5/8/2015     Past Surgical History:   Procedure Laterality Date    ANGIOGRAM, CORONARY ARTERY Right 3/20/2019    Performed by [facility] at [facility] CATH LAB    ANGIOGRAM-PV STENT N/A 6/28/2019    [facility]    ANGIOPLASTY      total x5 stents    Bypass graft study  3/20/2019    Performed by [facility]que [facility] at [facility] CATH LAB    COLONOSCOPY N/A 10/6/2015    Performed by Shekhar Richards MD at [facility] ENDO (2ND FLR)    CORONARY ARTERY BYPASS GRAFT  2017    x3    CYST REMOVAL      GASTRECTOMY      GASTRECTOMY-SLEEVE-LAPAROSCOPIC - [id number] N/A 12/22/2015    Performed by Micheal Villavicencio Jr., MD at [facility] OR 2ND FLR    KNEE ARTHROSCOPY      perianal surgery      perianal cyst removed    pleurx N/A 5/17/2019    Performed by [facility] at [facility] OR 2ND FLR     General Information Comments: NFPE not completed on 7/4/19. Pt sitting in bed with covers over his head. Requesting not to be disturbed. Per chart records, pt has multiple comorbidities . TPN infusing at 50 mL/hr. +ascites  "with paracenthesis. +anasacra       Nutrition Risk Screen    Nutrition Risk Screen: no indicators present    Nutrition/Diet History    Patient Reported Diet/Restrictions/Preferences: general  Spiritual, Cultural Beliefs, [employer] Practices, Values that Affect Care: no  Food Allergies: NKFA  Factors Affecting Nutritional Intake: altered gastrointestinal function, altered taste    Anthropometrics    Temp: 98.5 °F (36.9 °C)  Height Method: Stated  Height: 5' 7" (170.2 cm)  Height (inches): 67 in  Weight Method: Bed Scale  Weight: 117.7 kg (259 lb 7.7 oz)  Weight (lb): 259.48 lb  Ideal Body Weight (IBW), Male: 148 lb  % Ideal Body Weight, Male (lb): 175.32 lb  BMI (Calculated): 40.7  BMI Grade: greater than 40 - morbid obesity  Usual Body Weight (UBW), k kg(clinic visit on 2019)  Weight Change Amount: 20 lb 8 oz  % Usual Body Weight: 92.87  % Weight Change From Usual Weight: -7.32 %       Lab/Procedures/Meds    Pertinent Labs Reviewed: reviewed  Recent Labs   Lab 19   *   K 5.2*      CO2 25   BUN 45*   CREATININE 4.6*   MG 2.2     Recent Labs   Lab 19   WBC 4.50   RBC 2.49*   HGB 7.2*   HCT 22.4*      MCV 90   MCH 28.9   MCHC 32.1     Recent Labs   Lab 19   ALT 15   AST 14   ALKPHOS 69   BILITOT 0.4   PROT 4.7*   ALBUMIN 3.3*         Pertinent Medications Reviewed: reviewed  Scheduled Meds:   atorvastatin  40 mg Oral Daily    clindamycin 600 MG/50 ML D5W  600 mg Intravenous Q8H    cyanocobalamin  1,000 mcg Subcutaneous Daily    epoetin vladimir-ebpx (RETACRIT) injection  100 Units/kg Subcutaneous Daily    famotidine (PF)  20 mg Intravenous Daily    heparin (porcine)  5,000 Units Subcutaneous Q8H    insulin aspart U-100  4 Units Subcutaneous TIDWM    insulin detemir U-100  10 Units Subcutaneous QHS    iron sucrose  100 mg Intravenous QAM    lidocaine (PF) 10 mg/ml (1%)  1 mL Other Once    lipase-protease-amylase 24,000-76,000-120,000 units  1 " capsule Oral TID WM    And    lipase-protease-amylase 6,000-19,000-30,000 units  2 capsule Oral TID WM    metoprolol succinate  25 mg Oral Daily    midodrine  2.5 mg Oral TID WM    tamsulosin  1 capsule Oral Daily     Continuous Infusions:   albumin human 25% 12.5 g (07/04/19 1013)    DOPamine 2 mcg/kg/min (07/04/19 1143)     PRN Meds:.acetaminophen, albuterol-ipratropium, Dextrose 10% Bolus, glucagon (human recombinant), insulin aspart U-100, ondansetron, ramelteon, senna-docusate 8.6-50 mg, sodium chloride 0.9%, traMADol, traMADol    Estimated/Assessed Needs    Weight Used For Calorie Calculations: 117.7 kg (259 lb 7.7 oz)     Energy Calorie Requirements (kcal): 1925  Energy Need Method: Falls-St Jeor     Protein Requirements: 118(1.0 gm Pro/kg)  Weight Used For Protein Calculations: 117.7 kg (259 lb 7.7 oz)     Estimated Fluid Requirement Method: RDA Method  RDA Method (mL): 1925  CHO Requirement: 220 gm      Nutrition Prescription Ordered    Current Diet Order: 2000 calorie diabetic, Low K, Low Na, 1 L FR    Evaluation of Received Nutrient/Fluid Intake    I/O: -4.6L since admit  Comments: LBM 7/1/19     % Intake of Estimated Energy Needs: 50 - 75 %  % Meal Intake: 25 - 50 %    Nutrition Risk    Level of Risk/Frequency of Follow-up: (2x week)     Assessment and Plan    Anasarca  Nutrition Problem  Unintended weight gain      Related to (etiology):   +Ascities with Paracenthesis  +Generalized body edema  Acute Kidney Injury      Signs and Symptoms (as evidenced by):   BMI > 40   DEC po intake   Supplemental Nutrition via TPN   Ed Lymphedema of Lower extrementies      Interventions:  Collaboration with other providers      Nutrition Diagnosis Status:   New             Monitor and Evaluation    Food and Nutrient Intake: energy intake  Food and Nutrient Adminstration: diet order  Anthropometric Measurements: weight, weight change, body mass index  Biochemical Data, Medical Tests and Procedures: electrolyte  and renal panel, gastrointestinal profile, glucose/endocrine profile, inflammatory profile, lipid profile  Nutrition-Focused Physical Findings: overall appearance, extremities, muscles and bones, head and eyes, skin     Malnutrition Assessment  Malnutrition Type: chronic illness     Edema (Fluid Accumulation): 3-->moderate([place] Lower Leg Lymphedema; Ascities; )   Fluid Accumulation Evaluation: moderate         Nutrition Follow-Up    RD Follow-up?: Yes

## 2019-07-04 NOTE — PT/OT/SLP RE-EVAL
"Physical Therapy Re-evaluation    Patient Name:  Jian Arrieta   MRN:  [id number]    Recommendations:     Discharge Recommendations:  nursing facility, skilled   Discharge Equipment Recommendations: (TBD)   Barriers to discharge: None    Assessment:     Jian Arrieta is a 63 y.o. male admitted with a medical diagnosis of Portal hypertension.  He presents with the following impairments/functional limitations:  weakness, impaired self care skills, impaired balance, decreased coordination, decreased safety awareness, decreased ROM, impaired skin, impaired joint extensibility, impaired endurance, impaired functional mobilty, decreased upper extremity function, impaired coordination, edema, gait instability, decreased lower extremity function. Pt required Mod A for supine <> sit; Min A for STS; min A for bed <> BSC <> chair transfer c/ handheld assist.    Rehab Prognosis:  Good; patient would benefit from acute skilled PT services to address these deficits and reach maximum level of function.      Recent Surgery: Procedure(s) (LRB):  ANGIOGRAM-PV STENT (N/A) 6 Days Post-Op    Plan:     During this hospitalization, patient to be seen 6 x/week to address the above listed problems via gait training, therapeutic activities, neuromuscular re-education, therapeutic exercises  · Plan of Care Expires:  08/04/19   Plan of Care Reviewed with: patient    Subjective     Communicated with RNFrancine prior to session.  Patient found supine with bed alarm, pulse ox (continuous), telemetry, SCD, peripheral IV, blood pressure cuff, mathew catheter upon PT entry to room, agreeable to evaluation.      Chief Complaint: r/o "stress" in parts of his body  Patient comments/goals: Pt states he would like to walk without falling  Pain/Comfort:  Pain Rating 1: 0/10  Pain Rating Post-Intervention 1: 0/10    Patients cultural, spiritual, Mandaeism conflicts given the current situation: no      Objective:     Patient found with: bed alarm, pulse ox " (continuous), telemetry, SCD, peripheral IV, blood pressure cuff, mathew catheter     General Precautions: Standard, fall, hearing impaired   Orthopedic Precautions:N/A   Braces: N/A     Exams:  · Cognitive Exam:  Patient is oriented to Person, Place, Time and Situation; pt is verbose and needs verbal cueing to stay on task  · Gross Motor Coordination:  mildly impaired c/ gait  · Postural Exam:  Patient presented with the following abnormalities:    · -       Rounded shoulders  · -       Posterior pelvic tilt  · -       Body Habitus  · Skin Integrity/Edema:      · -       Edema: +++ R > L;  Pitting in [place] LE  · RLE ROM: WFL  · RLE Strength: 3+/5 functionally tested   · LLE ROM: WFL  · LLE Strength: 3+/5 functionally tested       Functional Mobility:  · Bed Mobility:     · Rolling Right: minimum assistance  · Scooting: minimum assistance <> moderate assistance; extra time needed  · Supine to Sit: moderate assistance  · Transfers:     · Sit to Stand:  minimum assistance with hand-held assist  · Bed to Chair: minimum assistance with  hand-held assist  using  Stand Pivot  · Gait: 2ft x2 bed <> BSC <> chair; pt demo wide CLEO; flat foor landing c/ vision dependence to maintain balance and slight ffd trunk lean; increased lateral body sway c/ reaching strategy to maintain balance; VC for handplacment and safety.  · Balance: dyanmic balance: Fair    AM-PAC 6 CLICK MOBILITY  Total Score:16       Therapeutic Activities and Exercises:  PT re-eval completed c/ progressive mobility training as detailed above; Pt transferred to chair end educated to sit OOB 1-3hrs as tolerated c/ LE elevated.    Patient left up in chair with all lines intact, call button in reach and RN notified.    GOALS:   Multidisciplinary Problems     Physical Therapy Goals        Problem: Physical Therapy Goal    Goal Priority Disciplines Outcome Goal Variances Interventions   Physical Therapy Goal     PT, PT/OT Ongoing (interventions implemented as  appropriate)     Description:  Goals to be met by: 2019    Patient will increase functional independence with mobility by performin. Sit to supine with Mod I  2. Sit to stand transfer with Mod I  3. Bed to chair transfer with Supervision using Rolling Walker  3. Gait  x50 feet with Supervision using Rolling Walker.   4. Lower extremity exercise program x12 reps per handout, with supervision                        History:     Past Medical History:   Diagnosis Date    Alcohol abuse     Anasarca 2019    Arthropathy associated with neurological disorder 2015    Atherosclerosis     Charcot foot due to diabetes mellitus     Chronic combined systolic and diastolic heart failure 2019 Left VentricleModerate decreased ejection fraction at 30%. Normal left ventricular cavity size. Normal wall thickness observed. Grade I (mild) left ventricular diastolic dysfunction consistent with impaired relaxation. Normal left atrial pressure. Moderate, global hypokinesis(see wall scoring diagram). Right VentricleNormal cavity size, wall thickness and ejection fraction. Wall motion n    Chronic pancreatitis 2019    Colon polyps     approx 5 yrs ago    Coronary artery disease due to calcified coronary lesion 2015    5 stents on ASA      Diabetic polyneuropathy associated with type 2 diabetes mellitus 2015    Diverticulosis 2019    DM type 2 with diabetic peripheral neuropathy 2019    Encounter for blood transfusion     Essential hypertension 2019    Former smoker 2015    Healed ulcer of left foot on examination 2017    Hydrocele     approx 1.5 yrs ago    Hypoalbuminemia 2019    Lymphedema of both lower extremities 2019    Mixed hyperlipidemia 2015    Morbid obesity with BMI of 50.0-59.9, adult 2015    Onychomycosis of multiple toenails with type 2 diabetes mellitus and peripheral neuropathy 2017    Perianal cyst      approx 2 yrs ago    Pseudocyst of pancreas 1/28/2019 1-28-19 Liver has a cirrhotic morphology with no focal lesions.  Significant interval increase in ascites when compared to prior exam which may account for patient's abdominal distension.  Hypodense air-fluid collection along the body of the pancreas which is slightly smaller when compared to prior CT.  Findings may relate to pancreatic necrosis with pancreatic pseudocysts with infected pseudocyst    Skin cancer     skin cancer    Sleep apnea 8/26/2015    Status post bariatric surgery 1/11/2016    Type 2 diabetes mellitus, with long-term current use of insulin 5/8/2015       Past Surgical History:   Procedure Laterality Date    ANGIOGRAM, CORONARY ARTERY Right 3/20/2019    Performed by [facility] at [facility] CATH LAB    ANGIOGRAM-PV STENT N/A 6/28/2019    [facility]    ANGIOPLASTY      total x5 stents    Bypass graft study  3/20/2019    Performed by [facility] at [facility] CATH LAB    COLONOSCOPY N/A 10/6/2015    Performed by Shekhar Richards MD at [facility] ENDO (2ND FLR)    CORONARY ARTERY BYPASS GRAFT  2017    x3    CYST REMOVAL      GASTRECTOMY      GASTRECTOMY-SLEEVE-LAPAROSCOPIC - [id number] N/A 12/22/2015    Performed by Micheal Villavicencio Jr., MD at [facility] OR 2ND FLR    KNEE ARTHROSCOPY      perianal surgery      perianal cyst removed    pleurx N/A 5/17/2019    Performed by [facility] Diagnostic Provider at [facility] OR 2ND FLR       Time Tracking:     PT Received On: 07/04/19  PT Start Time: 1245     PT Stop Time: 1325  PT Total Time (min): 40 min co re-eval c/ OT    Billable Minutes: Re-eval 10 and Therapeutic Activity 30      Leticia Ovalle, PT  07/04/2019

## 2019-07-04 NOTE — PLAN OF CARE
Problem: Occupational Therapy Goal  Goal: Occupational Therapy Goal  Goals to be met by: 7/29/2019    Patient will increase functional independence with ADLs by performing:    UE Dressing with Modified [place].  LE Dressing with Modified [place].  Toileting from toilet with Modified [place] for hygiene and clothing management.   Supine to sit with Modified [place].  Step transfer with Modified [place]  Toilet transfer to toilet with Modified [place].  Increased functional strength to WFL for ADLS.  Upper extremity exercise program 10 reps per handout, with supervision.     Outcome: Ongoing (interventions implemented as appropriate)  Upon OT re-evaluation, goals remain appropriate. Rec SNF, DME TBD pending progress, likely BSC, TTB. Pt will need follow up with lymphedema specialist after completion of SNF placement. Sister states they would like to find a provider in [place], [place]. Cont OT POC

## 2019-07-04 NOTE — PLAN OF CARE
Problem: Adult Inpatient Plan of Care  Goal: Plan of Care Review  Outcome: Ongoing (interventions implemented as appropriate)  Pt is awake and alert. Pt worked with pt and ot. Urine output is >30ml/hr. Pt is compliant with his 1L fluid restriction. Blood sugars have been maintained with scheduled and prn insulin. Pt has no c/o pain or n/v. Safety maintained.

## 2019-07-04 NOTE — PLAN OF CARE
Problem: Oral Intake Inadequate  Goal: Improved Oral Intake    Intervention: Promote and Optimize Oral Intake  Recommendation:   1. REC custom TPN if goal is for long term use.  gm dex, 90 gm Pro, 50 gm Fat daily to provide 1540 calories, 90 gm Pro, 1.44 L fluids, GIR 1.2 mg/kg/min.   2. PharmD to adjust electrolytes.    3. Continue with 2000 calorie, Low Na, 2 gm K diet with 1000 mL FR         Goals: Pt to meet > 85% of calorie/protein needs   Nutrition Goal Status: new  Communication of RD Recs: reviewed with RN     Nutrition Discharge Planning: Home on 2000 calorie Renal diet

## 2019-07-04 NOTE — PROGRESS NOTES
Progress Note    Admit Date: 6/27/2019   LOS: 7 days     SUBJECTIVE:   Transferred to ICU yesterday for close monitoring. George inserted for accurate I/O in setting of rising BUN/Cr.  Given bumex and lactulose x 1.  Started on midodrine.    Pt denies n/v, cp/sob.  Has not ambulated much.       Scheduled Meds:   custom IVPB builder   Intravenous BID    atorvastatin  40 mg Oral Daily    clindamycin 600 MG/50 ML D5W  600 mg Intravenous Q8H    cyanocobalamin  1,000 mcg Subcutaneous Daily    epoetin vladimir-ebpx (RETACRIT) injection  100 Units/kg Subcutaneous Daily    famotidine (PF)  20 mg Intravenous Daily    fat emulsion 20%  250 mL Intravenous Daily    heparin (porcine)  5,000 Units Subcutaneous Q8H    insulin aspart U-100  4 Units Subcutaneous TIDWM    insulin detemir U-100  10 Units Subcutaneous QHS    iron sucrose  100 mg Intravenous QAM    lidocaine (PF) 10 mg/ml (1%)  1 mL Other Once    lipase-protease-amylase 24,000-76,000-120,000 units  1 capsule Oral TID WM    And    lipase-protease-amylase 6,000-19,000-30,000 units  2 capsule Oral TID WM    metoprolol succinate  50 mg Oral Daily    midodrine  2.5 mg Oral TID WM    tamsulosin  1 capsule Oral Daily     Continuous Infusions:   sodium chloride 0.9% 50 mL/hr at 07/03/19 1224    albumin human 25% 12.5 g (07/04/19 0544)    Amino acid 4.25% - dextrose 10% (CLINIMIX-E) solution with additives (1L provides 42.5 gm AA, 100 gm CHO (340 kcal/L dextrose), Na 35, K 30, Mg 5, Ca 4.5, Acetate 70, Cl 39, Phos 15) 50 mL/hr at 07/03/19 1218    DOPamine 2 mcg/kg/min (07/03/19 1233)     PRN Meds:acetaminophen, albuterol-ipratropium, Dextrose 10% Bolus, glucagon (human recombinant), insulin aspart U-100, ondansetron, ramelteon, senna-docusate 8.6-50 mg, sodium chloride 0.9%, traMADol, traMADol    Review of patient's allergies indicates:  No Known Allergies    OBJECTIVE:     Vital Signs (Most Recent)  Temp: 98.5 °F (36.9 °C) (07/04/19 0703)  Pulse: 100 (07/04/19  0703)  Resp: 13 (07/04/19 0703)  BP: (!) 104/57 (07/04/19 0703)  SpO2: 99 % (07/04/19 0703)    Vital Signs Range (Last 24H):  Temp:  [97.8 °F (36.6 °C)-98.6 °F (37 °C)]   Pulse:  []   Resp:  [2-43]   BP: ()/(51-84)   SpO2:  [94 %-100 %]     I & O (Last 24H):    Intake/Output Summary (Last 24 hours) at 7/4/2019 0806  Last data filed at 7/4/2019 0600  Gross per 24 hour   Intake 3781.15 ml   Output 2154 ml   Net 1627.15 ml     Physical Exam:  NAD, AAO  Morbidly obese   RRR  Non-labored breathing, good respiratory effort  Abd soft, obese, non-tender, no rebound, no guarding, no peritoneal signs  George in place draining clear/yellow urine  BLE pitting edema, mildly improved, with some weeping noted to R-shin, dressing covering weeping C/D/I. Dressing over L-shin also C/D/I.        LABS  CBC  Recent Labs   Lab 07/02/19  0440 07/03/19  0532 07/04/19  0417   WBC 5.34 6.93 4.50   RBC 2.47* 2.79* 2.49*   HGB 7.1* 7.8* 7.2*   HCT 21.8* 24.6* 22.4*    269 229   MCV 88 88 90   MCH 28.7 28.0 28.9   MCHC 32.6 31.7* 32.1     CMP  Recent Labs   Lab 07/02/19  0440 07/03/19  0532 07/03/19  1353 07/04/19  0417   * 46* 147* 241*   CALCIUM 8.3* 8.5* 8.2* 8.6*   ALBUMIN 2.8* 3.2*  --  3.3*   PROT 4.5* 4.8*  --  4.7*    137 136 135*   K 4.7 5.2* 5.4* 5.2*   CO2 27 27 25 25    104 103 102   BUN 42* 44* 44* 45*   CREATININE 3.6* 4.2* 4.4* 4.6*   ALKPHOS 68 66  --  69   ALT 14 16  --  15   AST 20 13  --  14   BILITOT 0.4 0.5  --  0.4       Recent Labs   Lab 07/02/19  0440 07/03/19  0532 07/03/19  1353 07/04/19  0417   CALCIUM 8.3* 8.5* 8.2* 8.6*   MG 1.9 2.0  --  2.2   PHOS 4.7* 4.5  --  5.5*         POCT-Glucose  POCT Glucose   Date Value Ref Range Status   07/04/2019 254 (H) 70 - 110 mg/dL Final   07/04/2019 261 (H) 70 - 110 mg/dL Final   07/03/2019 174 (H) 70 - 110 mg/dL Final   07/03/2019 166 (H) 70 - 110 mg/dL Final   07/03/2019 103 70 - 110 mg/dL Final   07/03/2019 116 (H) 70 - 110 mg/dL Final    07/03/2019 38 (LL) 70 - 110 mg/dL Final   07/03/2019 104 70 - 110 mg/dL Final   07/02/2019 173 (H) 70 - 110 mg/dL Final   07/02/2019 254 (H) 70 - 110 mg/dL Final   07/02/2019 89 70 - 110 mg/dL Final   07/02/2019 159 (H) 70 - 110 mg/dL Final   07/01/2019 208 (H) 70 - 110 mg/dL Final   07/01/2019 300 (H) 70 - 110 mg/dL Final   07/01/2019 233 (H) 70 - 110 mg/dL Final       COAGS  Recent Labs   Lab 07/02/19  0440 07/03/19  0532 07/04/19  0417   INR 1.2 1.2 1.2       CE  Recent Labs   Lab 06/27/19  2213 07/01/19  1300   TROPONINI 0.006 <0.006     BNP  No results for input(s): BNP in the last 168 hours.  UA  No results for input(s): COLORU, CLARITYU, SPECGRAV, PHUR, PROTEINUA, GLUCOSEU, BILIRUBINCON, BLOODU, WBCU, RBCU, BACTERIA, MUCUS, NITRITE, LEUKOCYTESUR, UROBILINOGEN, HYALINECASTS in the last 168 hours.    LAST HbA1c  Lab Results   Component Value Date    HGBA1C 7.1 (H) 05/23/2019     US liver- normal direction and flow.  Ascites not quantified. Unable to perform hepatic fibrosis index  Renal US- normal         ASSESSMENT/PLAN:   63yoM with multiple comorbidities with extrahepatic PV occlusion plus mild cirrhosis with CRI and mild CHF contributing to recalcitrant ascites plus non-compliance with sodium/fluids as well as fatigue due to hypokalemia and fluid overload, now s/p paracentesis, transhepatic portal venogram, pressure measurements, and venoplasty with IR 6/28/19.       - NEURO: continue pain control prn  - CV:  Intermittently hypotensive, responds to fluids.  Continue dopamine 2mc/kg/min, midodrine. Will decrease metoprololg.   - RESP: stable, continue IS, duonebs prn  - FEN/GI: low potassium diabetic diet, on Clinimix at 50 ml/hr. Stop IVF. Got bumex and lactulose x1 yesterday  - RENAL: Cr worsened to 4.6 today, good UOP. Continue strict I/O. Continue flomax   - HEME: stable   - ID: IV clindamycin, transition to PO prior to discharge  - ENDOCRINE: Continue levemir 10 u qHS and 4u novolog with meals.   -  MSK: Ambulate with PT/OT, elevate legs, wound care to open wounds   - PPX: hep subcu TID, famotidine     Reyna Barajas MD  [facility] Surgery PGY-2  07/04/2019

## 2019-07-04 NOTE — PLAN OF CARE
Problem: Adult Inpatient Plan of Care  Goal: Plan of Care Review  Outcome: Ongoing (interventions implemented as appropriate)  Pt is aaox4. VSS. Afebrile. Safety maintained. George placed. Pt has had no complaints of pain. SCD's placed on. Floated heels. Dopamine, Albumin, TPN infusing as ordered. Pt blood sugars have remained stable and scheduled insulin given as ordered.

## 2019-07-04 NOTE — PLAN OF CARE
Problem: Physical Therapy Goal  Goal: Physical Therapy Goal  Goals to be met by: 2019    Patient will increase functional independence with mobility by performin. Sit to supine with Mod I  2. Sit to stand transfer with Mod I  3. Bed to chair transfer with Supervision using Rolling Walker  3. Gait  x50 feet with Supervision using Rolling Walker.   4. Lower extremity exercise program x12 reps per handout, with supervision      Outcome: Ongoing (interventions implemented as appropriate)  PT re-evaluation completed. Plan of care and goals established and discussed with Patient.      Discharge Recommendation: SNF  DME Recommendation: TBD

## 2019-07-04 NOTE — PT/OT/SLP RE-EVAL
"Occupational Therapy   Re-evaluation    Name: Jian Arrieta  MRN: [id number]  Admitting Diagnosis:  Portal hypertension 6 Days Post-Op    Recommendations:     Discharge Recommendations: nursing facility, skilled  Discharge Equipment Recommendations:  (TBD pending progress, possibly TTB)  Barriers to discharge:  Decreased caregiver support, Inaccessible home environment    Assessment:     Jian Arrieta is a 63 y.o. male with a medical diagnosis of Portal hypertension.  He presents with deconditioning but improved BLE edema L>R.  Performance deficits affecting function are weakness, impaired self care skills, impaired balance, decreased lower extremity function, decreased upper extremity function, impaired functional mobilty, impaired endurance, gait instability, impaired cardiopulmonary response to activity, decreased safety awareness.      Rehab Prognosis:  Good; patient would benefit from acute skilled OT services to address these deficits and reach maximum level of function.       Plan:     Patient to be seen 5 x/week to address the above listed problems via self-care/home management, therapeutic activities, therapeutic exercises  · Plan of Care Expires: 08/04/19  · Plan of Care Reviewed with: patient, sibling    Subjective     Chief Complaint: Pt c/o "stress" when asked to assess pain. States that he has had pain in shoulder, back and penis.  Patient/Family stated goals: "To be able to walk without falling" per patient. Sister states she would like pt to be able to take care of self interdependently   Communicated with: paola prior to session.  Pain/Comfort:  · Pain Rating 1: 0/10    Objective:     Communicated with: paola prior to session.  Patient found HOB elevated with: (ICU monitoring) upon OT entry to room.    General Precautions: Standard, fall, hearing impaired   Orthopedic Precautions:N/A   Braces: N/A     Occupational Performance:    Bed Mobility:    · Patient completed Rolling/Turning to Left with  minimum " "assistance  · Patient completed Scooting/Bridging with moderate assistance  · Patient completed Supine to Sit with moderate assistance      Functional Mobility/Transfers:  · Patient completed Sit <> Stand Transfer with minimum assistance  with  hand-held assist   · Patient completed Bed <> Chair Transfer using Step Transfer technique with minimum assistance with hand-held assist  · Patient completed Toilet Transfer Step Transfer technique with minimum assistance with  hand-held assist  Functional Mobility: Pt with fair dynamic seated and fair- dynamic standing balance.     Activities of Daily Living:  · Upper Body Dressing: moderate assistance to don gown supine in bed  · Lower Body Dressing: total assistance to don B shoes    Cognitive/Visual Perceptual:  Cognitive/Psychosocial Skills:     -       Oriented to: Person, Place, Time and Situation   -       Follows Commands/attention:Follows multistep  commands  -       Communication: clear/fluent  -       Memory: No Deficits noted  -       Safety awareness/insight to disability: impaired; understands that his balance is decreased but prefers to complete t/fs using "furniture surfing"  -       Mood/Affect/Coping skills/emotional control: Appropriate to situation        Physical Exam:  Postural examination/scapula alignment:    -       Rounded shoulders  -       Forward head  Skin integrity: BLE red, edematous; blister noted LLE and RN notified. SCDs remained off 2/2 RN request  Edema:  Severe BLE R>L  Motor Planning:    -       WFL  Upper Extremity Range of Motion:     Upper Extremity Strength:     Strength:    Fine Motor Coordination:    -       Intact    AMPAC 6 Click:  AMPAC Total Score: 18    Treatment & Education:  Pt educated on role of OT and POC.   Pt performing skills as listed above.  Pt required increased v/c to attend to task at hand. OT spoke with sister at length about care plan and OT answered all questions within scope if practice; otherwise OT " referred sister to RN and  for through follow up for all questions.    Patient left up in chair with all lines intact, call button in reach and nsg notified    GOALS:   Multidisciplinary Problems     Occupational Therapy Goals        Problem: Occupational Therapy Goal    Goal Priority Disciplines Outcome Interventions   Occupational Therapy Goal     OT, PT/OT Ongoing (interventions implemented as appropriate)    Description:  Goals to be met by: 7/29/2019    Patient will increase functional independence with ADLs by performing:    UE Dressing with Modified Carter.  LE Dressing with Modified Carter.  Toileting from toilet with Modified Carter for hygiene and clothing management.   Supine to sit with Modified Carter.  Step transfer with Modified Carter  Toilet transfer to toilet with Modified Carter.  Increased functional strength to WFL for ADLS.  Upper extremity exercise program 10 reps per handout, with supervision.                      History:     Past Medical History:   Diagnosis Date    Alcohol abuse     Anasarca 1/28/2019    Arthropathy associated with neurological disorder 9/2/2015    Atherosclerosis     Charcot foot due to diabetes mellitus     Chronic combined systolic and diastolic heart failure 01/29/2019 1-28-19 Left VentricleModerate decreased ejection fraction at 30%. Normal left ventricular cavity size. Normal wall thickness observed. Grade I (mild) left ventricular diastolic dysfunction consistent with impaired relaxation. Normal left atrial pressure. Moderate, global hypokinesis(see wall scoring diagram). Right VentricleNormal cavity size, wall thickness and ejection fraction. Wall motion n    Chronic pancreatitis 1/28/2019    Colon polyps     approx 5 yrs ago    Coronary artery disease due to calcified coronary lesion 05/08/2015    5 stents on ASA      Diabetic polyneuropathy associated with type 2 diabetes mellitus 9/2/2015     Diverticulosis 1/28/2019    DM type 2 with diabetic peripheral neuropathy 2/4/2019    Encounter for blood transfusion     Essential hypertension 1/28/2019    Former smoker 8/26/2015    Healed ulcer of left foot on examination 6/20/2017    Hydrocele     approx 1.5 yrs ago    Hypoalbuminemia 2/4/2019    Lymphedema of both lower extremities 1/29/2019    Mixed hyperlipidemia 5/8/2015    Morbid obesity with BMI of 50.0-59.9, adult 5/8/2015    Onychomycosis of multiple toenails with type 2 diabetes mellitus and peripheral neuropathy 6/20/2017    Perianal cyst     approx 2 yrs ago    Pseudocyst of pancreas 1/28/2019 1-28-19 Liver has a cirrhotic morphology with no focal lesions.  Significant interval increase in ascites when compared to prior exam which may account for patient's abdominal distension.  Hypodense air-fluid collection along the body of the pancreas which is slightly smaller when compared to prior CT.  Findings may relate to pancreatic necrosis with pancreatic pseudocysts with infected pseudocyst    Skin cancer     skin cancer    Sleep apnea 8/26/2015    Status post bariatric surgery 1/11/2016    Type 2 diabetes mellitus, with long-term current use of insulin 5/8/2015       Past Surgical History:   Procedure Laterality Date    ANGIOGRAM, CORONARY ARTERY Right 3/20/2019    Performed by Bob Duque MD at [facility] CATH LAB    ANGIOGRAM-PV STENT N/A 6/28/2019    Performed [facility] OR    ANGIOPLASTY      total x5 stents    Bypass graft study  3/20/2019    Performed by Bob Duque MD at [facility] CATH LAB    COLONOSCOPY N/A 10/6/2015    Performed by Shekhar Richards MD at [facility] ENDO (2ND FLR)    CORONARY ARTERY BYPASS GRAFT  2017    x3    CYST REMOVAL      GASTRECTOMY      GASTRECTOMY-SLEEVE-LAPAROSCOPIC - 17956 N/A 12/22/2015    Performed by Micheal Villavicencio Jr., MD at [facility] OR 2ND FLR    KNEE ARTHROSCOPY      perianal surgery      perianal cyst removed     pleurx N/A 5/17/2019    Performed by [facility] at [facility] OR [address] [place] [address]       Time Tracking:     OT Date of Treatment: 07/04/19  OT Start Time: 1245  OT Stop Time: 1331  OT Total Time (min): 46 min    Billable Minutes:Re-eval 10 Co Tx PT  Self Care/Home Management 13    Cierra Escobar OT  7/4/2019

## 2019-07-05 LAB
ALBUMIN SERPL BCP-MCNC: 3.5 G/DL (ref 3.5–5.2)
ALP SERPL-CCNC: 71 U/L (ref 55–135)
ALT SERPL W/O P-5'-P-CCNC: 15 U/L (ref 10–44)
ANION GAP SERPL CALC-SCNC: 7 MMOL/L (ref 8–16)
AST SERPL-CCNC: 13 U/L (ref 10–40)
BASOPHILS # BLD AUTO: 0.01 K/UL (ref 0–0.2)
BASOPHILS NFR BLD: 0.2 % (ref 0–1.9)
BILIRUB SERPL-MCNC: 0.4 MG/DL (ref 0.1–1)
BUN SERPL-MCNC: 47 MG/DL (ref 8–23)
CALCIUM SERPL-MCNC: 8.6 MG/DL (ref 8.7–10.5)
CHLORIDE SERPL-SCNC: 101 MMOL/L (ref 95–110)
CO2 SERPL-SCNC: 27 MMOL/L (ref 23–29)
CREAT SERPL-MCNC: 4.9 MG/DL (ref 0.5–1.4)
DIFFERENTIAL METHOD: ABNORMAL
EOSINOPHIL # BLD AUTO: 0.1 K/UL (ref 0–0.5)
EOSINOPHIL NFR BLD: 1.8 % (ref 0–8)
ERYTHROCYTE [DISTWIDTH] IN BLOOD BY AUTOMATED COUNT: 15.1 % (ref 11.5–14.5)
EST. GFR  (AFRICAN AMERICAN): 13 ML/MIN/1.73 M^2
EST. GFR  (NON AFRICAN AMERICAN): 12 ML/MIN/1.73 M^2
GLUCOSE SERPL-MCNC: 263 MG/DL (ref 70–110)
HCT VFR BLD AUTO: 22.2 % (ref 40–54)
HGB BLD-MCNC: 7 G/DL (ref 14–18)
INR PPP: 1.1 (ref 0.8–1.2)
LYMPHOCYTES # BLD AUTO: 1.1 K/UL (ref 1–4.8)
LYMPHOCYTES NFR BLD: 22.9 % (ref 18–48)
MAGNESIUM SERPL-MCNC: 2 MG/DL (ref 1.6–2.6)
MCH RBC QN AUTO: 28.5 PG (ref 27–31)
MCHC RBC AUTO-ENTMCNC: 31.5 G/DL (ref 32–36)
MCV RBC AUTO: 90 FL (ref 82–98)
MONOCYTES # BLD AUTO: 0.4 K/UL (ref 0.3–1)
MONOCYTES NFR BLD: 8.1 % (ref 4–15)
NEUTROPHILS # BLD AUTO: 3.3 K/UL (ref 1.8–7.7)
NEUTROPHILS NFR BLD: 67 % (ref 38–73)
PHOSPHATE SERPL-MCNC: 5.5 MG/DL (ref 2.7–4.5)
PLATELET # BLD AUTO: 229 K/UL (ref 150–350)
PMV BLD AUTO: 8.7 FL (ref 9.2–12.9)
POCT GLUCOSE: 209 MG/DL (ref 70–110)
POCT GLUCOSE: 247 MG/DL (ref 70–110)
POCT GLUCOSE: 262 MG/DL (ref 70–110)
POCT GLUCOSE: 274 MG/DL (ref 70–110)
POCT GLUCOSE: 366 MG/DL (ref 70–110)
POTASSIUM SERPL-SCNC: 4.8 MMOL/L (ref 3.5–5.1)
PROT SERPL-MCNC: 4.8 G/DL (ref 6–8.4)
PROTHROMBIN TIME: 11.2 SEC (ref 9–12.5)
RBC # BLD AUTO: 2.46 M/UL (ref 4.6–6.2)
SODIUM SERPL-SCNC: 135 MMOL/L (ref 136–145)
TROPONIN I SERPL DL<=0.01 NG/ML-MCNC: <0.006 NG/ML (ref 0–0.03)
WBC # BLD AUTO: 4.94 K/UL (ref 3.9–12.7)

## 2019-07-05 PROCEDURE — 63600175 PHARM REV CODE 636 W HCPCS: Performed by: SURGERY

## 2019-07-05 PROCEDURE — 84484 ASSAY OF TROPONIN QUANT: CPT

## 2019-07-05 PROCEDURE — 85610 PROTHROMBIN TIME: CPT

## 2019-07-05 PROCEDURE — 25000003 PHARM REV CODE 250: Performed by: STUDENT IN AN ORGANIZED HEALTH CARE EDUCATION/TRAINING PROGRAM

## 2019-07-05 PROCEDURE — 63600175 PHARM REV CODE 636 W HCPCS: Mod: JG | Performed by: STUDENT IN AN ORGANIZED HEALTH CARE EDUCATION/TRAINING PROGRAM

## 2019-07-05 PROCEDURE — 97530 THERAPEUTIC ACTIVITIES: CPT

## 2019-07-05 PROCEDURE — 84100 ASSAY OF PHOSPHORUS: CPT

## 2019-07-05 PROCEDURE — 25000003 PHARM REV CODE 250

## 2019-07-05 PROCEDURE — 63600175 PHARM REV CODE 636 W HCPCS

## 2019-07-05 PROCEDURE — 25000003 PHARM REV CODE 250: Performed by: SURGERY

## 2019-07-05 PROCEDURE — 97110 THERAPEUTIC EXERCISES: CPT

## 2019-07-05 PROCEDURE — P9047 ALBUMIN (HUMAN), 25%, 50ML: HCPCS | Mod: JG | Performed by: STUDENT IN AN ORGANIZED HEALTH CARE EDUCATION/TRAINING PROGRAM

## 2019-07-05 PROCEDURE — S0028 INJECTION, FAMOTIDINE, 20 MG: HCPCS | Performed by: SURGERY

## 2019-07-05 PROCEDURE — 80053 COMPREHEN METABOLIC PANEL: CPT

## 2019-07-05 PROCEDURE — 63600175 PHARM REV CODE 636 W HCPCS: Performed by: STUDENT IN AN ORGANIZED HEALTH CARE EDUCATION/TRAINING PROGRAM

## 2019-07-05 PROCEDURE — 83735 ASSAY OF MAGNESIUM: CPT

## 2019-07-05 PROCEDURE — 93005 ELECTROCARDIOGRAM TRACING: CPT

## 2019-07-05 PROCEDURE — 20000000 HC ICU ROOM

## 2019-07-05 PROCEDURE — 85025 COMPLETE CBC W/AUTO DIFF WBC: CPT

## 2019-07-05 PROCEDURE — 93010 ELECTROCARDIOGRAM REPORT: CPT | Mod: ,,, | Performed by: INTERNAL MEDICINE

## 2019-07-05 PROCEDURE — 94761 N-INVAS EAR/PLS OXIMETRY MLT: CPT

## 2019-07-05 PROCEDURE — 97535 SELF CARE MNGMENT TRAINING: CPT

## 2019-07-05 PROCEDURE — S0077 INJECTION, CLINDAMYCIN PHOSP: HCPCS

## 2019-07-05 PROCEDURE — 93010 EKG 12-LEAD: ICD-10-PCS | Mod: ,,, | Performed by: INTERNAL MEDICINE

## 2019-07-05 RX ORDER — ISOSORBIDE MONONITRATE 30 MG/1
30 TABLET, EXTENDED RELEASE ORAL DAILY
Status: DISCONTINUED | OUTPATIENT
Start: 2019-07-06 | End: 2019-07-15 | Stop reason: HOSPADM

## 2019-07-05 RX ORDER — FAMOTIDINE 20 MG/1
20 TABLET, FILM COATED ORAL DAILY
Status: DISCONTINUED | OUTPATIENT
Start: 2019-07-06 | End: 2019-07-15 | Stop reason: HOSPADM

## 2019-07-05 RX ORDER — METOCLOPRAMIDE 10 MG/1
10 TABLET ORAL
Status: DISCONTINUED | OUTPATIENT
Start: 2019-07-05 | End: 2019-07-15 | Stop reason: HOSPADM

## 2019-07-05 RX ORDER — METOPROLOL TARTRATE 1 MG/ML
INJECTION, SOLUTION INTRAVENOUS
Status: DISPENSED
Start: 2019-07-05 | End: 2019-07-06

## 2019-07-05 RX ADMIN — IRON SUCROSE 100 MG: 20 INJECTION, SOLUTION INTRAVENOUS at 08:07

## 2019-07-05 RX ADMIN — PANCRELIPASE 1 CAPSULE: 24000; 76000; 120000 CAPSULE, DELAYED RELEASE PELLETS ORAL at 05:07

## 2019-07-05 RX ADMIN — CLINDAMYCIN IN 5 PERCENT DEXTROSE 600 MG: 12 INJECTION, SOLUTION INTRAVENOUS at 05:07

## 2019-07-05 RX ADMIN — DOPAMINE HYDROCHLORIDE IN DEXTROSE 2 MCG/KG/MIN: 1.6 INJECTION, SOLUTION INTRAVENOUS at 02:07

## 2019-07-05 RX ADMIN — NITROGLYCERIN 0.4 MG: 0.4 TABLET, ORALLY DISINTEGRATING SUBLINGUAL at 09:07

## 2019-07-05 RX ADMIN — LIDOCAINE HYDROCHLORIDE: 20 SOLUTION ORAL; TOPICAL at 09:07

## 2019-07-05 RX ADMIN — MIDODRINE HYDROCHLORIDE 2.5 MG: 2.5 TABLET ORAL at 11:07

## 2019-07-05 RX ADMIN — HEPARIN SODIUM 5000 UNITS: 5000 INJECTION, SOLUTION INTRAVENOUS; SUBCUTANEOUS at 05:07

## 2019-07-05 RX ADMIN — INSULIN ASPART 3 UNITS: 100 INJECTION, SOLUTION INTRAVENOUS; SUBCUTANEOUS at 01:07

## 2019-07-05 RX ADMIN — CLINDAMYCIN IN 5 PERCENT DEXTROSE 600 MG: 12 INJECTION, SOLUTION INTRAVENOUS at 09:07

## 2019-07-05 RX ADMIN — PANCRELIPASE 2 CAPSULE: 30000; 6000; 19000 CAPSULE, DELAYED RELEASE PELLETS ORAL at 05:07

## 2019-07-05 RX ADMIN — ALBUMIN (HUMAN) 12.5 G: 12.5 SOLUTION INTRAVENOUS at 05:07

## 2019-07-05 RX ADMIN — ALBUMIN (HUMAN) 12.5 G: 12.5 SOLUTION INTRAVENOUS at 08:07

## 2019-07-05 RX ADMIN — ONDANSETRON 4 MG: 2 INJECTION INTRAMUSCULAR; INTRAVENOUS at 09:07

## 2019-07-05 RX ADMIN — HEPARIN SODIUM 5000 UNITS: 5000 INJECTION, SOLUTION INTRAVENOUS; SUBCUTANEOUS at 02:07

## 2019-07-05 RX ADMIN — INSULIN ASPART 4 UNITS: 100 INJECTION, SOLUTION INTRAVENOUS; SUBCUTANEOUS at 08:07

## 2019-07-05 RX ADMIN — ALBUMIN (HUMAN) 12.5 G: 12.5 SOLUTION INTRAVENOUS at 03:07

## 2019-07-05 RX ADMIN — RAMELTEON 8 MG: 8 TABLET, FILM COATED ORAL at 10:07

## 2019-07-05 RX ADMIN — FAMOTIDINE 20 MG: 10 INJECTION, SOLUTION INTRAVENOUS at 08:07

## 2019-07-05 RX ADMIN — ASCORBIC ACID, VITAMIN A PALMITATE, CHOLECALCIFEROL, THIAMINE HYDROCHLORIDE, RIBOFLAVIN-5 PHOSPHATE SODIUM, PYRIDOXINE HYDROCHLORIDE, NIACINAMIDE, DEXPANTHENOL, ALPHA-TOCOPHEROL ACETATE, VITAMIN K1, FOLIC ACID, BIOTIN, CYANOCOBALAMIN: 200; 3300; 200; 6; 3.6; 6; 40; 15; 10; 150; 600; 60; 5 INJECTION, SOLUTION INTRAVENOUS at 09:07

## 2019-07-05 RX ADMIN — NITROGLYCERIN 0.4 MG: 0.4 TABLET, ORALLY DISINTEGRATING SUBLINGUAL at 02:07

## 2019-07-05 RX ADMIN — CLINDAMYCIN IN 5 PERCENT DEXTROSE 600 MG: 12 INJECTION, SOLUTION INTRAVENOUS at 02:07

## 2019-07-05 RX ADMIN — INSULIN ASPART 3 UNITS: 100 INJECTION, SOLUTION INTRAVENOUS; SUBCUTANEOUS at 05:07

## 2019-07-05 RX ADMIN — DOPAMINE HYDROCHLORIDE IN DEXTROSE 2 MCG/KG/MIN: 1.6 INJECTION, SOLUTION INTRAVENOUS at 10:07

## 2019-07-05 RX ADMIN — EPOETIN ALFA-EPBX 10000 UNITS: 10000 INJECTION, SOLUTION INTRAVENOUS; SUBCUTANEOUS at 09:07

## 2019-07-05 RX ADMIN — INSULIN ASPART 4 UNITS: 100 INJECTION, SOLUTION INTRAVENOUS; SUBCUTANEOUS at 11:07

## 2019-07-05 RX ADMIN — METOCLOPRAMIDE 10 MG: 10 TABLET ORAL at 05:07

## 2019-07-05 RX ADMIN — PANCRELIPASE 2 CAPSULE: 30000; 6000; 19000 CAPSULE, DELAYED RELEASE PELLETS ORAL at 08:07

## 2019-07-05 RX ADMIN — PANCRELIPASE 1 CAPSULE: 24000; 76000; 120000 CAPSULE, DELAYED RELEASE PELLETS ORAL at 08:07

## 2019-07-05 RX ADMIN — MIDODRINE HYDROCHLORIDE 2.5 MG: 2.5 TABLET ORAL at 05:07

## 2019-07-05 RX ADMIN — METOPROLOL SUCCINATE 25 MG: 25 TABLET, FILM COATED, EXTENDED RELEASE ORAL at 08:07

## 2019-07-05 RX ADMIN — INSULIN ASPART 4 UNITS: 100 INJECTION, SOLUTION INTRAVENOUS; SUBCUTANEOUS at 04:07

## 2019-07-05 RX ADMIN — MIDODRINE HYDROCHLORIDE 2.5 MG: 2.5 TABLET ORAL at 08:07

## 2019-07-05 RX ADMIN — ATORVASTATIN CALCIUM 40 MG: 40 TABLET, FILM COATED ORAL at 08:07

## 2019-07-05 RX ADMIN — ALBUMIN (HUMAN) 12.5 G: 12.5 SOLUTION INTRAVENOUS at 10:07

## 2019-07-05 RX ADMIN — SODIUM CHLORIDE 1000 ML: 0.45 INJECTION, SOLUTION INTRAVENOUS at 09:07

## 2019-07-05 RX ADMIN — CYANOCOBALAMIN 1000 MCG: 1000 INJECTION, SOLUTION INTRAMUSCULAR at 08:07

## 2019-07-05 RX ADMIN — PANCRELIPASE 2 CAPSULE: 30000; 6000; 19000 CAPSULE, DELAYED RELEASE PELLETS ORAL at 11:07

## 2019-07-05 RX ADMIN — TAMSULOSIN HYDROCHLORIDE 0.4 MG: 0.4 CAPSULE ORAL at 08:07

## 2019-07-05 RX ADMIN — HEPARIN SODIUM 5000 UNITS: 5000 INJECTION, SOLUTION INTRAVENOUS; SUBCUTANEOUS at 09:07

## 2019-07-05 RX ADMIN — PANCRELIPASE 1 CAPSULE: 24000; 76000; 120000 CAPSULE, DELAYED RELEASE PELLETS ORAL at 11:07

## 2019-07-05 RX ADMIN — EPOETIN ALFA-EPBX 1600 UNITS: 2000 INJECTION, SOLUTION INTRAVENOUS; SUBCUTANEOUS at 09:07

## 2019-07-05 RX ADMIN — INSULIN DETEMIR 14 UNITS: 100 INJECTION, SOLUTION SUBCUTANEOUS at 09:07

## 2019-07-05 NOTE — PLAN OF CARE
Problem: Adult Inpatient Plan of Care  Goal: Plan of Care Review  Outcome: Ongoing (interventions implemented as appropriate)  Pt is aaox4. VSS. Afebrile. Pt urine output is >30ml/hr. 1 large BM recorded.  Reglan and prn zofran was given today for x1 episode of n/v. Pt worked with pt/ot and sat up in the chair. Blood sugars have been controlled with scheduled insulin. [facility] consult was placed for since he was expressing that he had chest pain 4/10. EKG done (see results) and  troponin <0.006. Safety maintained.

## 2019-07-05 NOTE — PT/OT/SLP PROGRESS
Physical Therapy Treatment    Patient Name:  Jian Arrieta   MRN:  [id number]    Recommendations:     Discharge Recommendations:  nursing facility, skilled   Discharge Equipment Recommendations: (TBD)   Barriers to discharge: decline in functional status from baseline    Assessment:     Jian Arrieta is a 63 y.o. male admitted with a medical diagnosis of Portal hypertension.  He presents with the following impairments/functional limitations:  weakness, impaired endurance, gait instability, impaired functional mobilty, impaired self care skills, impaired balance, decreased upper extremity function, decreased lower extremity function, decreased safety awareness, impaired joint extensibility, edema, impaired coordination, impaired cardiopulmonary response to activity     Rehab Prognosis: Good; patient would benefit from acute skilled PT services to address these deficits and reach maximum level of function.    Recent Surgery: Procedure(s) (LRB):  ANGIOGRAM-PV STENT (N/A) 7 Days Post-Op    Plan:     During this hospitalization, patient to be seen 6 x/week to address the identified rehab impairments via gait training, therapeutic activities, therapeutic exercises, neuromuscular re-education and progress toward the following goals:    · Plan of Care Expires:  08/04/19    Subjective     Pain/Comfort:  · Pain Rating 1: 0/10  · Pain Rating Post-Intervention 1: 0/10      Objective:     Communicated with nurse prior to session.  Patient found with bed alarm, pulse ox (continuous), telemetry, SCD, peripheral IV, blood pressure cuff, mathew catheter upon PT entry to room.     General Precautions: Standard, fall, hearing impaired   Orthopedic Precautions:N/A   Braces: N/A     Functional Mobility:  · Bed Mobility:     · Rolling Right: minimum assistance and moderate assistance  · Scooting: stand by assistance and contact guard assistance  · Supine to Sit: moderate assistance  · Transfers:     · Sit to Stand:  contact guard  assistance with no AD  · Bed to Chair: minimum assistance with  no AD  using  Step Transfer  · Bedside chair to BSC: minimum assistance with no AD using step transfer  · Gait: Pt took 4-6 small steps during transfer surface to surface; wide CLEO, minimal swaying of trunk , feet flat, guarded movement      AM-PAC 6 CLICK MOBILITY  Turning over in bed (including adjusting bedclothes, sheets and blankets)?: 3  Sitting down on and standing up from a chair with arms (e.g., wheelchair, bedside commode, etc.): 3  Moving from lying on back to sitting on the side of the bed?: 2  Moving to and from a bed to a chair (including a wheelchair)?: 3  Need to walk in hospital room?: 3  Climbing 3-5 steps with a railing?: 2  Basic Mobility Total Score: 16       Therapeutic Activities and Exercises:   Patient performed minimal UE/LE ex-demonstrated to  therapist how he uses his T band for heel slides and arm ext but advised pt to go slower with active exercise; moved to EOB with increased time and effort and modA; initial lying BP BP 94/51 HR 69, sitting BP 88/52 at EOB; informed nurse of BP and OK'd getting into chair but in a minute to allow BP to increase; asked pt to perform active LE ex while seated at EOB but pt impulsively stood up instead and assisted pt in chair; advised pt to listen to therapist's instructions for safe transitions; nurse came to assist with lines and turning pt's chair around; then pt wanted to use BSC; placed BSC next to chair and pt transferred to BSC with nursing present and Jerald; will cont with POC.    Patient left on BSC with all lines intact, call button in reach and nurse present..    GOALS:   Multidisciplinary Problems     Physical Therapy Goals        Problem: Physical Therapy Goal    Goal Priority Disciplines Outcome Goal Variances Interventions   Physical Therapy Goal     PT, PT/OT Ongoing (interventions implemented as appropriate)     Description:  Goals to be met by: 7/28/2019    Patient will  increase functional independence with mobility by performin. Sit to supine with Mod I  2. Sit to stand transfer with Mod I  3. Bed to chair transfer with Supervision using Rolling Walker  3. Gait  x50 feet with Supervision using Rolling Walker.   4. Lower extremity exercise program x12 reps per handout, with supervision                        Time Tracking:     PT Received On: 19  PT Start Time: 1030     PT Stop Time: 1105  PT Total Time (min): 35 min     Billable Minutes: Therapeutic Activity 35 minutes    Treatment Type: Treatment  PT/PTA: PT     PTA Visit Number: 0     Prudence Ordaz, PT  2019

## 2019-07-05 NOTE — PLAN OF CARE
Problem: Adult Inpatient Plan of Care  Goal: Plan of Care Review  Outcome: Revised  Pt VSS for shift. Pt C/O chest pain, notified MD Jenn. EKG, Nitroglycerin SL, and troponin ordered. Troponin came back normal. MD to read EKG in AM. After Nitroglycerin pt never c/o chest pain again. UO wonderful for shift. Albumin, Dopamine, TPN infusing into PICC w/o issue.

## 2019-07-05 NOTE — PLAN OF CARE
Problem: Physical Therapy Goal  Goal: Physical Therapy Goal  Goals to be met by: 2019    Patient will increase functional independence with mobility by performin. Sit to supine with Mod I  2. Sit to stand transfer with Mod I  3. Bed to chair transfer with Supervision using Rolling Walker  3. Gait  x50 feet with Supervision using Rolling Walker.   4. Lower extremity exercise program x12 reps per handout, with supervision       Outcome: Ongoing (interventions implemented as appropriate)  Patient performed minimal UE/LE ex; moved to EOB with increased time and effort and modA; initial lying BP BP 94/51 HR 69, sitting BP 88/52 at EOB; informed nurse of BP and OK'd getting into chair but in a minute to allow BP to increase; asked pt to perform LE ex while seated at EOB but pt impulsively stood up instead and assisted pt in chair; nurse came to assist with lines and turning pt's chair around; then pt wanted to use BSC; placed BSC next to chair and pt transferred to BSC with nursing present and Jerald; will cont with POC.

## 2019-07-05 NOTE — PROGRESS NOTES
Addendum: age undetermined infarct on EKG. Will consult cardiology and repeat EKG, troponins.     Progress Note    Admit Date: 6/27/2019   LOS: 8 days     SUBJECTIVE:   Complained of chest pain overnight, Trop normal, EKG not concerning for MI.  No n/v, f/c.  UOP still good but Cr rising.  Ambulated wit PT yesterday.     Scheduled Meds:   atorvastatin  40 mg Oral Daily    clindamycin 600 MG/50 ML D5W  600 mg Intravenous Q8H    cyanocobalamin  1,000 mcg Subcutaneous Daily    epoetin vladimir-ebpx (RETACRIT) injection  100 Units/kg Subcutaneous Daily    famotidine (PF)  20 mg Intravenous Daily    heparin (porcine)  5,000 Units Subcutaneous Q8H    insulin aspart U-100  4 Units Subcutaneous TIDWM    insulin detemir U-100  14 Units Subcutaneous QHS    iron sucrose  100 mg Intravenous QAM    lidocaine (PF) 10 mg/ml (1%)  1 mL Other Once    lipase-protease-amylase 24,000-76,000-120,000 units  1 capsule Oral TID WM    And    lipase-protease-amylase 6,000-19,000-30,000 units  2 capsule Oral TID WM    metoprolol succinate  25 mg Oral Daily    midodrine  2.5 mg Oral TID WM    tamsulosin  1 capsule Oral Daily     Continuous Infusions:   albumin human 25% 12.5 g (07/05/19 0551)    Amino acid 4.25% - dextrose 10% (CLINIMIX-E) solution with additives (1L provides 42.5 gm AA, 100 gm CHO (340 kcal/L dextrose), Na 35, K 30, Mg 5, Ca 4.5, Acetate 70, Cl 39, Phos 15) 50 mL/hr at 07/04/19 2205    DOPamine 2 mcg/kg/min (07/04/19 1143)     PRN Meds:acetaminophen, albuterol-ipratropium, Dextrose 10% Bolus, glucagon (human recombinant), insulin aspart U-100, nitroGLYCERIN, ondansetron, ramelteon, senna-docusate 8.6-50 mg, sodium chloride 0.9%, traMADol, traMADol    Review of patient's allergies indicates:  No Known Allergies    OBJECTIVE:     Vital Signs (Most Recent)  Temp: 98 °F (36.7 °C) (07/05/19 0745)  Pulse: 100 (07/05/19 0800)  Resp: 13 (07/05/19 0800)  BP: 117/69 (07/05/19 0800)  SpO2: 100 % (07/05/19 0800)    Vital  Signs Range (Last 24H):  Temp:  [97.7 °F (36.5 °C)-98.5 °F (36.9 °C)]   Pulse:  []   Resp:  [10-21]   BP: ()/(52-83)   SpO2:  [89 %-100 %]     I & O (Last 24H):    Intake/Output Summary (Last 24 hours) at 7/5/2019 0842  Last data filed at 7/5/2019 0600  Gross per 24 hour   Intake 1202.13 ml   Output 2137 ml   Net -934.87 ml     Physical Exam:  NAD, AAO  Morbidly obese   RRR  Non-labored breathing, good respiratory effort  Abd soft, obese, non-tender, no rebound, no guarding, no peritoneal signs  George in place draining clear/yellow urine  BLE pitting edema, mildly improved, with some weeping noted to R-shin, dressing covering weeping C/D/I. Dressing over L-shin also C/D/I.        LABS  CBC  Recent Labs   Lab 07/03/19  0532 07/04/19  0417 07/05/19  0459   WBC 6.93 4.50 4.94   RBC 2.79* 2.49* 2.46*   HGB 7.8* 7.2* 7.0*   HCT 24.6* 22.4* 22.2*    229 229   MCV 88 90 90   MCH 28.0 28.9 28.5   MCHC 31.7* 32.1 31.5*     CMP  Recent Labs   Lab 07/03/19  0532 07/03/19  1353 07/04/19 0417 07/05/19  0459   GLU 46* 147* 241* 263*   CALCIUM 8.5* 8.2* 8.6* 8.6*   ALBUMIN 3.2*  --  3.3* 3.5   PROT 4.8*  --  4.7* 4.8*    136 135* 135*   K 5.2* 5.4* 5.2* 4.8   CO2 27 25 25 27    103 102 101   BUN 44* 44* 45* 47*   CREATININE 4.2* 4.4* 4.6* 4.9*   ALKPHOS 66  --  69 71   ALT 16  --  15 15   AST 13  --  14 13   BILITOT 0.5  --  0.4 0.4       Recent Labs   Lab 07/03/19  0532 07/03/19  1353 07/04/19  0417 07/05/19  0459   CALCIUM 8.5* 8.2* 8.6* 8.6*   MG 2.0  --  2.2 2.0   PHOS 4.5  --  5.5* 5.5*         POCT-Glucose  POCT Glucose   Date Value Ref Range Status   07/05/2019 274 (H) 70 - 110 mg/dL Final   07/05/2019 366 (H) 70 - 110 mg/dL Final   07/04/2019 365 (H) 70 - 110 mg/dL Final   07/04/2019 317 (H) 70 - 110 mg/dL Final   07/04/2019 237 (H) 70 - 110 mg/dL Final   07/04/2019 254 (H) 70 - 110 mg/dL Final   07/04/2019 261 (H) 70 - 110 mg/dL Final   07/03/2019 174 (H) 70 - 110 mg/dL Final   07/03/2019  166 (H) 70 - 110 mg/dL Final   07/03/2019 103 70 - 110 mg/dL Final   07/03/2019 116 (H) 70 - 110 mg/dL Final   07/03/2019 38 (LL) 70 - 110 mg/dL Final   07/03/2019 104 70 - 110 mg/dL Final   07/02/2019 173 (H) 70 - 110 mg/dL Final   07/02/2019 254 (H) 70 - 110 mg/dL Final   07/02/2019 89 70 - 110 mg/dL Final       COAGS  Recent Labs   Lab 07/03/19  0532 07/04/19  0417 07/05/19  0459   INR 1.2 1.2 1.1       CE  Recent Labs   Lab 07/01/19  1300 07/04/19 2055   TROPONINI <0.006 <0.006     BNP  No results for input(s): BNP in the last 168 hours.  UA  No results for input(s): COLORU, CLARITYU, SPECGRAV, PHUR, PROTEINUA, GLUCOSEU, BILIRUBINCON, BLOODU, WBCU, RBCU, BACTERIA, MUCUS, NITRITE, LEUKOCYTESUR, UROBILINOGEN, HYALINECASTS in the last 168 hours.    LAST HbA1c  Lab Results   Component Value Date    HGBA1C 7.1 (H) 05/23/2019     US liver- normal direction and flow.  Ascites not quantified. Unable to perform hepatic fibrosis index  Renal US- normal         ASSESSMENT/PLAN:   63yoM with multiple comorbidities with extrahepatic PV occlusion plus mild cirrhosis with CRI and mild CHF contributing to recalcitrant ascites plus non-compliance with sodium/fluids as well as fatigue due to hypokalemia and fluid overload, now s/p paracentesis, transhepatic portal venogram, pressure measurements, and venoplasty with IR 6/28/19.       - NEURO: continue pain control prn  - CV:  Intermittently hypotensive, responds to fluids.  Continue dopamine 2mc/kg/min, midodrine. Will decrease metoprolol. Will get CXR in setting of chest pain  - RESP: stable, continue IS, duonebs prn  - FEN/GI: low potassium diabetic diet, on Clinimix at 50 ml/hr.   - RENAL: Cr worsened to 4.9 today, good UOP. Continue strict I/O. Continue flomax. Will likely get input from Nephrology today  - HEME: stable   - ID: IV clindamycin, transition to PO prior to discharge  - ENDOCRINE: CBG elevated, will resume pm levemir at 14 u (home dose) and 4u novolog with meals.    - MSK: Ambulate with PT/OT, elevate legs, wound care to open wounds   - PPX: hep subcu TID, famotidine     Reyna Barajas MD  [employer] Surgery PGY-2  07/05/2019

## 2019-07-05 NOTE — PLAN OF CARE
Problem: Occupational Therapy Goal  Goal: Occupational Therapy Goal  Goals to be met by: 7/29/2019    Patient will increase functional independence with ADLs by performing:    UE Dressing with Modified [place].  LE Dressing with Modified [place].  Toileting from toilet with Modified [place] for hygiene and clothing management.   Supine to sit with Modified [place].  Step transfer with Modified [place]  Toilet transfer to toilet with Modified [place].  Increased functional strength to WFL for ADLS.  Upper extremity exercise program 10 reps per handout, with supervision.     Outcome: Ongoing (interventions implemented as appropriate)  The pt is making steady progress towards meeting all goals.  Continue OT per POC.

## 2019-07-05 NOTE — PT/OT/SLP PROGRESS
Occupational Therapy   Treatment    Name: Jian Arrieta  MRN: [id number]  Admitting Diagnosis:  Portal hypertension  7 Days Post-Op    Recommendations:     Discharge Recommendations: nursing facility, skilled  Discharge Equipment Recommendations:  (TBD pending progress, possibly TTB)  Barriers to discharge:   none     Assessment:     Jian Arrieta is a 63 y.o. male with a medical diagnosis of Portal hypertension.  He presents with decreased I with ADLs, and functional transfers. Performance deficits affecting function are weakness, impaired endurance, impaired self care skills, impaired balance, gait instability, decreased lower extremity function, decreased safety awareness, edema, impaired cardiopulmonary response to activity.     Rehab Prognosis:  Good; patient would benefit from acute skilled OT services to address these deficits and reach maximum level of function.       Plan:     Patient to be seen 5 x/week to address the above listed problems via self-care/home management, therapeutic activities, therapeutic exercises  · Plan of Care Expires: 08/04/19  · Plan of Care Reviewed with: patient, sibling    Subjective     Pain/Comfort:  Pain Rating 1: 0/10    Objective:     Communicated with: Nursing prior to session.  Patient found up in chair with blood pressure cuff, peripheral IV, telemetry, mathew catheter upon OT entry to room.    General Precautions: Standard, fall, hearing impaired   Orthopedic Precautions:N/A   Braces:   N/A    Occupational Performance:     Bed Mobility:    · Not tested    Functional Mobility/Transfers:  · Not tested    Activities of Daily Living:  · Feeding:  modified independence   · Grooming: stand by assistance seated in chair  · Upper Body Dressing: stand by assistance       [facility] 6 Click ADL: 18    Treatment & Education:  The pt performed the activities listed above, and was educated on the role of occupational therapy in an acute setting.   The pt performed 1 set of 10 BUE red  theraband exercises in all planes.      Patient left up in chair with all lines intact, call button in reach and nursing notifiedEducation:      GOALS:   Multidisciplinary Problems     Occupational Therapy Goals        Problem: Occupational Therapy Goal    Goal Priority Disciplines Outcome Interventions   Occupational Therapy Goal     OT, PT/OT Ongoing (interventions implemented as appropriate)    Description:  Goals to be met by: 7/29/2019    Patient will increase functional independence with ADLs by performing:    UE Dressing with Modified [place].  LE Dressing with Modified [place].  Toileting from toilet with Modified [place] for hygiene and clothing management.   Supine to sit with Modified [place].  Step transfer with Modified [place]  Toilet transfer to toilet with Modified [place].  Increased functional strength to WFL for ADLS.  Upper extremity exercise program 10 reps per handout, with supervision.                      Time Tracking:     OT Date of Treatment: 07/05/19  OT Start Time: 1124  OT Stop Time: 1220  OT Total Time (min): 56 min    Billable Minutes:Self Care/Home Management 33 minutes  Therapeutic Exercise 23 minutes    Jackelyn Birmingham OT  7/5/2019

## 2019-07-05 NOTE — PLAN OF CARE
Problem: Adult Inpatient Plan of Care  Goal: Plan of Care Review  Outcome: Ongoing (interventions implemented as appropriate)  Pt on RA with documented sats.  Will continue to monitor.

## 2019-07-05 NOTE — CONSULTS
[facility]  Cardiology [facility]  Consult Note    Patient Name: Jian Arrieta  MRN: [id number]  Admission Date: 6/27/2019  Hospital Length of Stay: 8 days  Code Status: Full Code   Attending Provider: SON Rowe MD   Consulting Provider: Keenan Yen MD  Primary Care Physician: Leon Barajas DO  Principal Problem:Portal hypertension    Patient information was obtained from patient and ER records.     Inpatient consult to [facility]iology[facility]  Consult performed by: Keenan Yen MD  Consult ordered by: Reyna Barajas MD  Reason for consult: Chest Pain  Assessment/Recommendations:     Jian Arrieta is a 63 year old male with a hx of CAD s/p CABG, recent angigoram (patent LIMA to LAD, SVG to OM,  RCA with collaterals), combined HFpEF/HFrEF and anasarca , complaining of chest pain. His EKG from 7/5/2019 does not look different from his previous studies. He also will by hypotensive given his hepatic disease. The patient himself is fine with taking Nitro PRN, but has been advised that options would be a mononitrate or going up on his BB (HR is fine, and SBP >80 is acceptable). He is currently on 25 mg XL Toprol    1)Atypical Chest Pain  2)Combined heart failure  -- Update his troponin (currently pending) but likely to be negative  -- Can start on Imdur 30 XL in the morning and titrate as needed.  -- Continue other cardiac medications, including current dose of 25 mg Toprol  -- No need for further imaging/intervention        Subjective:     Chief Complaint:  Chest Pain      HPI: Jian Arrieta is a 63 year old male with a hx of CAD s/p CABG, recent angigoram (patent LIMA to LAD, SVG to OM,  RCA with collaterals), combined HFpEF/HFrEF and anasarca , complaining of chest pain. It was in his chest, with no radiation, felt like a pressure, constant and improved 4 minutes after having nitro. He had an EKG yesterday, and there was a question of inferior MI.  He had it both at 21:00 and at 23:00.    He has had serial negative tropon ins this admission.   Last angiogram 3/2019, and serial ECHOs in 2019.  On my seeing him, he was chest pain free.      He is known to have portal vein stenosis    Past Medical History:   Diagnosis Date    Alcohol abuse     Anasarca 1/28/2019    Arthropathy associated with neurological disorder 9/2/2015    Atherosclerosis     Charcot foot due to diabetes mellitus     Chronic combined systolic and diastolic heart failure 01/29/2019 1-28-19 Left VentricleModerate decreased ejection fraction at 30%. Normal left ventricular cavity size. Normal wall thickness observed. Grade I (mild) left ventricular diastolic dysfunction consistent with impaired relaxation. Normal left atrial pressure. Moderate, global hypokinesis(see wall scoring diagram). Right VentricleNormal cavity size, wall thickness and ejection fraction. Wall motion n    Chronic pancreatitis 1/28/2019    Colon polyps     approx 5 yrs ago    Coronary artery disease due to calcified coronary lesion 05/08/2015    5 stents on ASA      Diabetic polyneuropathy associated with type 2 diabetes mellitus 9/2/2015    Diverticulosis 1/28/2019    DM type 2 with diabetic peripheral neuropathy 2/4/2019    Encounter for blood transfusion     Essential hypertension 1/28/2019    Former smoker 8/26/2015    Healed ulcer of left foot on examination 6/20/2017    Hydrocele     approx 1.5 yrs ago    Hypoalbuminemia 2/4/2019    Lymphedema of both lower extremities 1/29/2019    Mixed hyperlipidemia 5/8/2015    Morbid obesity with BMI of 50.0-59.9, adult 5/8/2015    Onychomycosis of multiple toenails with type 2 diabetes mellitus and peripheral neuropathy 6/20/2017    Perianal cyst     approx 2 yrs ago    Pseudocyst of pancreas 1/28/2019 1-28-19 Liver has a cirrhotic morphology with no focal lesions.  Significant interval increase in ascites when compared to prior exam which may account for patient's abdominal distension.   Hypodense air-fluid collection along the body of the pancreas which is slightly smaller when compared to prior CT.  Findings may relate to pancreatic necrosis with pancreatic pseudocysts with infected pseudocyst    Skin cancer     skin cancer    Sleep apnea 8/26/2015    Status post bariatric surgery 1/11/2016    Type 2 diabetes mellitus, with long-term current use of insulin 5/8/2015       Past Surgical History:   Procedure Laterality Date    ANGIOGRAM, CORONARY ARTERY Right 3/20/2019    Performed by [facility]que [facility] at [facility] CATH LAB    ANGIOGRAM-PV STENT N/A 6/28/2019    Performed by [facility] Diagnostic Provider at [facility] OR    ANGIOPLASTY      total x5 stents    Bypass graft study  3/20/2019    Performed by Bob [facility]que MD at [facility] CATH LAB    COLONOSCOPY N/A 10/6/2015    Performed by Shekhar Richards MD at [facility] ENDO (2ND FLR)    CORONARY ARTERY BYPASS GRAFT  2017    x3    CYST REMOVAL      GASTRECTOMY      GASTRECTOMY-SLEEVE-LAPAROSCOPIC - 48934 N/A 12/22/2015    Performed by Micheal Villavicencio Jr., MD at [facility] OR 2ND FLR    KNEE ARTHROSCOPY      perianal surgery      perianal cyst removed    pleurx N/A 5/17/2019    Performed by [facility] Diagnostic Provider at [facility] OR 2ND FLR       Review of patient's allergies indicates:  No Known Allergies    No current facility-administered medications on file prior to encounter.      Current Outpatient Medications on File Prior to Encounter   Medication Sig    albumin human 25% 25 % bottle Inject 100 mLs (25 g total) into the vein 2 (two) times daily. (Patient taking differently: Inject 25 g into the vein 2 (two) times daily. Not daily)    albuterol-ipratropium (DUO-NEB) 2.5 mg-0.5 mg/3 mL nebulizer solution Take 3 mLs by nebulization every 4 (four) hours as needed for Wheezing. Rescue    insulin detemir (LEVEMIR FLEXPEN SUBQ) Inject 14 Units into the skin once daily.     acetaminophen (TYLENOL) 325 MG tablet Take 2 tablets (650 mg total) by mouth every 4  (four) hours as needed.    apixaban (ELIQUIS) 5 mg Tab Take 1 tablet (5 mg total) by mouth 2 (two) times daily.    atorvastatin (LIPITOR) 40 MG tablet Take 1 tablet (40 mg total) by mouth once daily.    clindamycin 600 MG/50 ML D5W (CLEOCIN) 600 mg/50 mL IVPB Inject 50 mLs (600 mg total) into the vein every 8 (eight) hours.    clopidogrel (PLAVIX) 75 mg tablet Take 1 tablet (75 mg total) by mouth once daily.    cyanocobalamin, vitamin B-12, 500 mcg Subl Place 1 tablet under the tongue every Monday.     furosemide (LASIX) 80 MG tablet Take 1 tablet (80 mg total) by mouth 2 (two) times daily.    insulin aspart U-100 (NOVOLOG) 100 unit/mL injection Inject 4 Units into the skin 3 (three) times daily before meals.    lipase-protease-amylase (CREON) 36,000-114,000- 180,000 unit CpDR Take 1 capsule by mouth 3 (three) times daily.    metoprolol succinate (TOPROL-XL) 50 MG 24 hr tablet Take 1 tablet (50 mg total) by mouth once daily.    omeprazole (PRILOSEC) 40 MG capsule Take 40 mg by mouth once daily.    senna-docusate 8.6-50 mg (PERICOLACE) 8.6-50 mg per tablet Take 1 tablet by mouth 2 (two) times daily as needed for Constipation.    spironolactone (ALDACTONE) 25 MG tablet Take 25 mg by mouth 2 (two) times daily.    tamsulosin (FLOMAX) 0.4 mg Cap Take 1 capsule by mouth once daily. Pt has not started taking as of 19.     Family History     Problem Relation (Age of Onset)    Cancer Mother, Father, Paternal Grandfather, Brother    Diabetes Maternal Grandmother    Heart disease Father    No Known Problems Paternal Grandmother    Obesity Sister    Parkinsonism Brother    Stroke Maternal Grandfather        Tobacco Use    Smoking status: Former Smoker     Packs/day: 2.00     Years: 30.00     Pack years: 60.00     Types: Cigarettes     Last attempt to quit: 2005     Years since quittin.4    Smokeless tobacco: Never Used   Substance and Sexual Activity    Alcohol use: No     Comment: started ~,  reports 1 shot daily, max 3 shots daily, vague about alcohol consumption. Last drink 9/2018    Drug use: No    Sexual activity: Not on file     Review of Systems   Constitution: Negative for chills.   HENT: Negative for congestion.    Eyes: Negative for blurred vision.   Cardiovascular: Negative for chest pain.   Respiratory: Negative for cough.    Endocrine: Negative for cold intolerance.   Hematologic/Lymphatic: Negative for adenopathy.   Skin: Negative for nail changes.   Musculoskeletal: Negative for arthritis.     Objective:     Vital Signs (Most Recent):  Temp: 97.7 °F (36.5 °C) (07/05/19 1130)  Pulse: 94 (07/05/19 1130)  Resp: 10 (07/05/19 1130)  BP: (!) 79/53 (07/05/19 1130)  SpO2: 98 % (07/05/19 1130) Vital Signs (24h Range):  Temp:  [97.7 °F (36.5 °C)-98.5 °F (36.9 °C)] 97.7 °F (36.5 °C)  Pulse:  [] 94  Resp:  [10-21] 10  SpO2:  [76 %-100 %] 98 %  BP: ()/(51-83) 79/53     Weight: 117.7 kg (259 lb 7.7 oz)  Body mass index is 40.64 kg/m².    SpO2: 98 %  O2 Device (Oxygen Therapy): room air      Intake/Output Summary (Last 24 hours) at 7/5/2019 1412  Last data filed at 7/5/2019 0853  Gross per 24 hour   Intake 1202.13 ml   Output 1927 ml   Net -724.87 ml       Lines/Drains/Airways     Peripherally Inserted Central Catheter Line                 PICC Double Lumen 05/17/19 1717  48 days          Drain                 Urethral Catheter 07/03/19 1753 Latex 14 Fr. 1 day                Physical Exam   Constitutional: He is oriented to person, place, and time. He appears well-developed and well-nourished.   HENT:   Moist oral mucosa   Neck:   No JVD   Cardiovascular:   RRR  No murmurs  +3 edema in feet     Pulmonary/Chest:   Central scar from CABG  Mild crackles in bases   Abdominal:   Ascites present  BS +   Musculoskeletal: Normal range of motion.   Neurological: He is alert and oriented to person, place, and time.   Nursing note and vitals reviewed.      Significant Labs:   Recent Lab Results        07/05/19  1150   07/05/19  0858   07/05/19  0554   07/05/19  0459   07/05/19  0121        Albumin       3.5       Alkaline Phosphatase       71       ALT       15       Anion Gap       7       AST       13       Baso #       0.01       Basophil%       0.2       BILIRUBIN TOTAL       0.4  Comment:  For infants and newborns, interpretation of results should be based  on gestational age, weight and in agreement with clinical  observations.  Premature Infant recommended reference ranges:  Up to 24 hours.............<8.0 mg/dL  Up to 48 hours............<12.0 mg/dL  3-5 days..................<15.0 mg/dL  6-29 days.................<15.0 mg/dL         BUN, Bld       47       Calcium       8.6       Chloride       101       CO2       27       Creatinine       4.9       Differential Method       Automated       eGFR if        13       eGFR if non        12  Comment:  Calculation used to obtain the estimated glomerular filtration  rate (eGFR) is the CKD-EPI equation.          Eos #       0.1       Eosinophil%       1.8       Glucose       263       Gran # (ANC)       3.3       Gran%       67.0       Hematocrit       22.2       Hemoglobin       7.0       Coumadin Monitoring INR       1.1  Comment:  Coumadin Therapy:  2.0 - 3.0 for INR for all indicators except mechanical heart valves  and antiphospholipid syndromes which should use 2.5 - 3.5.         Lymph #       1.1       Lymph%       22.9       Magnesium       2.0       MCH       28.5       MCHC       31.5       MCV       90       Mono #       0.4       Mono%       8.1       MPV       8.7       Phosphorus       5.5       Platelets       229       POCT Glucose 209 247 274   366     Potassium       4.8       PROTEIN TOTAL       4.8       Protime       11.2       RBC       2.46       RDW       15.1       Sodium       135       Troponin I               WBC       4.94                        07/04/19 2055   07/04/19 2047   07/04/19  1645         Albumin           Alkaline Phosphatase           ALT           Anion Gap           AST           Baso #           Basophil%           BILIRUBIN TOTAL           BUN, Bld           Calcium           Chloride           CO2           Creatinine           Differential Method           eGFR if            eGFR if non            Eos #           Eosinophil%           Glucose           Gran # (ANC)           Gran%           Hematocrit           Hemoglobin           Coumadin Monitoring INR           Lymph #           Lymph%           Magnesium           MCH           MCHC           MCV           Mono #           Mono%           MPV           Phosphorus           Platelets           POCT Glucose   365 317     Potassium           PROTEIN TOTAL           Protime           RBC           RDW           Sodium           Troponin I <0.006  Comment:  The reference interval for Troponin I represents the 99th percentile   cutoff   for our facility and is consistent with 3rd generation assay   performance.           WBC                 Significant Imaging:    Cath Lab  [facility] 3/20/19  · Prox LAD lesion , 95% stenosed.  · Prox Cx to Mid Cx lesion , 75% stenosed.  · Mid Cx to Dist Cx lesion , 90% stenosed.  · Prox RCA  with left to right collaterals from LAD to PDA  · Patent LIMA to LAD  · Patent SVG to OM2  ·  of SVG to RCA    ECHO 6/27/2019  · Mildly decreased left ventricular systolic function. The estimated ejection fraction is 45-50%  · Left ventricular diastolic dysfunction.  · Normal right ventricular systolic function.  · Normal central venous pressure (3 mm Hg).  · Extremely technically challenging study, poor endocardial visualization, would recommend repeating with contrast if additional information is desired.    ECHO 5/23/2019  · Normal left ventricular systolic function. The estimated ejection fraction is 65%  · No wall motion abnormalities.  · Normal LV diastolic function.  · Normal right  ventricular systolic function.  Assessment and Plan:     Active Diagnoses:    Diagnosis Date Noted POA    PRINCIPAL PROBLEM:  Portal hypertension [K76.6] 06/27/2019 Yes    Chronic acquired lymphedema [I89.0] 06/30/2019 Yes    Venous stasis ulcer of left lower leg with edema of left lower leg [I83.029, I83.892, L97.929, R60.9] 06/28/2019 Yes    Discharge planning issues [Z02.9] 06/25/2019 Not Applicable    Hepatic fibrosis [K74.0] 06/25/2019 Yes    Malnutrition of moderate degree [E44.0] 06/25/2019 Yes    Decreased mobility [R26.89] 06/11/2019 Yes    CHF (congestive heart failure) [I50.9] 06/05/2019 Yes    S/P abdominal paracentesis [Z98.890] 06/05/2019 Not Applicable    Alteration in skin integrity [R23.9] 05/23/2019 Yes    Venous stasis dermatitis of both lower extremities [I87.2] 05/23/2019 Yes    Stage 3 chronic kidney disease [N18.3] 05/23/2019 Yes    Fatty liver disease, nonalcoholic [K76.0] 05/09/2019 Yes    Hypertension associated with diabetes [E11.59, I10] 05/06/2019 Yes    Obesity hypoventilation syndrome [E66.2] 05/06/2019 Yes    Paroxysmal atrial fibrillation [I48.0] 03/16/2019 Yes    Anemia [D64.9] 03/15/2019 Yes    Other ascites [R18.8] 03/13/2019 Yes    ELIEZER (acute kidney injury) [N17.9] 03/13/2019 Yes    DM type 2 with diabetic peripheral neuropathy [E11.42] 02/04/2019 Yes    Hypoalbuminemia [E88.09] 02/04/2019 Yes    Chronic diastolic heart failure [I50.32] 01/29/2019 Yes    Lymphedema of both lower extremities [I89.0] 01/29/2019 Yes    Chronic pancreatitis [K86.1] 01/28/2019 Yes    Anasarca [R60.1] 01/28/2019 Yes    Sleep apnea [G47.30] 08/26/2015 Yes    Coronary artery disease due to calcified coronary lesion [I25.10, I25.84] 05/08/2015 Yes      Problems Resolved During this Admission:       VTE Risk Mitigation (From admission, onward)        Ordered     heparin (porcine) injection 5,000 Units  Every 8 hours      06/30/19 0944     Place sequential compression device   Until discontinued      06/27/19 1956     Place BRANNON hose  Until discontinued      06/27/19 1934     IP VTE HIGH RISK PATIENT  Once      06/27/19 1934          Thank you for your consult. I will follow-up with patient. Please contact us if you have any additional questions.    Keenan Yen MD  Cardiology   [facility]

## 2019-07-06 LAB
ABO + RH BLD: NORMAL
ALBUMIN SERPL BCP-MCNC: 3.4 G/DL (ref 3.5–5.2)
ALP SERPL-CCNC: 64 U/L (ref 55–135)
ALT SERPL W/O P-5'-P-CCNC: 15 U/L (ref 10–44)
ANION GAP SERPL CALC-SCNC: 6 MMOL/L (ref 8–16)
AST SERPL-CCNC: 14 U/L (ref 10–40)
BASOPHILS # BLD AUTO: 0.02 K/UL (ref 0–0.2)
BASOPHILS NFR BLD: 0.4 % (ref 0–1.9)
BILIRUB SERPL-MCNC: 0.4 MG/DL (ref 0.1–1)
BLD GP AB SCN CELLS X3 SERPL QL: NORMAL
BLD PROD TYP BPU: NORMAL
BLOOD UNIT EXPIRATION DATE: NORMAL
BLOOD UNIT TYPE CODE: 5100
BLOOD UNIT TYPE: NORMAL
BUN SERPL-MCNC: 46 MG/DL (ref 8–23)
CALCIUM SERPL-MCNC: 8.5 MG/DL (ref 8.7–10.5)
CHLORIDE SERPL-SCNC: 103 MMOL/L (ref 95–110)
CO2 SERPL-SCNC: 27 MMOL/L (ref 23–29)
CODING SYSTEM: NORMAL
CREAT SERPL-MCNC: 4.7 MG/DL (ref 0.5–1.4)
DIFFERENTIAL METHOD: ABNORMAL
DISPENSE STATUS: NORMAL
EOSINOPHIL # BLD AUTO: 0.1 K/UL (ref 0–0.5)
EOSINOPHIL NFR BLD: 1.9 % (ref 0–8)
ERYTHROCYTE [DISTWIDTH] IN BLOOD BY AUTOMATED COUNT: 15.5 % (ref 11.5–14.5)
EST. GFR  (AFRICAN AMERICAN): 14 ML/MIN/1.73 M^2
EST. GFR  (NON AFRICAN AMERICAN): 12 ML/MIN/1.73 M^2
GLUCOSE SERPL-MCNC: 263 MG/DL (ref 70–110)
HCT VFR BLD AUTO: 21.6 % (ref 40–54)
HGB BLD-MCNC: 6.7 G/DL (ref 14–18)
INR PPP: 1 (ref 0.8–1.2)
LYMPHOCYTES # BLD AUTO: 1 K/UL (ref 1–4.8)
LYMPHOCYTES NFR BLD: 20.4 % (ref 18–48)
MAGNESIUM SERPL-MCNC: 2.1 MG/DL (ref 1.6–2.6)
MCH RBC QN AUTO: 28.4 PG (ref 27–31)
MCHC RBC AUTO-ENTMCNC: 31 G/DL (ref 32–36)
MCV RBC AUTO: 92 FL (ref 82–98)
MONOCYTES # BLD AUTO: 0.4 K/UL (ref 0.3–1)
MONOCYTES NFR BLD: 7.8 % (ref 4–15)
NEUTROPHILS # BLD AUTO: 3.3 K/UL (ref 1.8–7.7)
NEUTROPHILS NFR BLD: 69.5 % (ref 38–73)
NUM UNITS TRANS PACKED RBC: NORMAL
PHOSPHATE SERPL-MCNC: 5 MG/DL (ref 2.7–4.5)
PLATELET # BLD AUTO: 221 K/UL (ref 150–350)
PMV BLD AUTO: 8.8 FL (ref 9.2–12.9)
POCT GLUCOSE: 218 MG/DL (ref 70–110)
POCT GLUCOSE: 279 MG/DL (ref 70–110)
POCT GLUCOSE: 284 MG/DL (ref 70–110)
POCT GLUCOSE: 296 MG/DL (ref 70–110)
POCT GLUCOSE: 367 MG/DL (ref 70–110)
POTASSIUM SERPL-SCNC: 4.6 MMOL/L (ref 3.5–5.1)
PROT SERPL-MCNC: 4.8 G/DL (ref 6–8.4)
PROTHROMBIN TIME: 10.8 SEC (ref 9–12.5)
RBC # BLD AUTO: 2.36 M/UL (ref 4.6–6.2)
SODIUM SERPL-SCNC: 136 MMOL/L (ref 136–145)
WBC # BLD AUTO: 4.85 K/UL (ref 3.9–12.7)

## 2019-07-06 PROCEDURE — 99223 1ST HOSP IP/OBS HIGH 75: CPT | Mod: ,,, | Performed by: INTERNAL MEDICINE

## 2019-07-06 PROCEDURE — 63600175 PHARM REV CODE 636 W HCPCS: Performed by: INTERNAL MEDICINE

## 2019-07-06 PROCEDURE — 85610 PROTHROMBIN TIME: CPT

## 2019-07-06 PROCEDURE — 86850 RBC ANTIBODY SCREEN: CPT

## 2019-07-06 PROCEDURE — 25000003 PHARM REV CODE 250

## 2019-07-06 PROCEDURE — 25000003 PHARM REV CODE 250: Performed by: STUDENT IN AN ORGANIZED HEALTH CARE EDUCATION/TRAINING PROGRAM

## 2019-07-06 PROCEDURE — S0077 INJECTION, CLINDAMYCIN PHOSP: HCPCS

## 2019-07-06 PROCEDURE — 99223 PR INITIAL HOSPITAL CARE,LEVL III: ICD-10-PCS | Mod: ,,, | Performed by: INTERNAL MEDICINE

## 2019-07-06 PROCEDURE — 36430 TRANSFUSION BLD/BLD COMPNT: CPT

## 2019-07-06 PROCEDURE — P9047 ALBUMIN (HUMAN), 25%, 50ML: HCPCS | Mod: JG | Performed by: STUDENT IN AN ORGANIZED HEALTH CARE EDUCATION/TRAINING PROGRAM

## 2019-07-06 PROCEDURE — 86920 COMPATIBILITY TEST SPIN: CPT

## 2019-07-06 PROCEDURE — 25000003 PHARM REV CODE 250: Performed by: SURGERY

## 2019-07-06 PROCEDURE — P9016 RBC LEUKOCYTES REDUCED: HCPCS

## 2019-07-06 PROCEDURE — 80053 COMPREHEN METABOLIC PANEL: CPT

## 2019-07-06 PROCEDURE — 85025 COMPLETE CBC W/AUTO DIFF WBC: CPT

## 2019-07-06 PROCEDURE — 63600175 PHARM REV CODE 636 W HCPCS: Performed by: SURGERY

## 2019-07-06 PROCEDURE — 99900035 HC TECH TIME PER 15 MIN (STAT)

## 2019-07-06 PROCEDURE — 83735 ASSAY OF MAGNESIUM: CPT

## 2019-07-06 PROCEDURE — 63600175 PHARM REV CODE 636 W HCPCS

## 2019-07-06 PROCEDURE — 63600175 PHARM REV CODE 636 W HCPCS: Performed by: STUDENT IN AN ORGANIZED HEALTH CARE EDUCATION/TRAINING PROGRAM

## 2019-07-06 PROCEDURE — 20000000 HC ICU ROOM

## 2019-07-06 PROCEDURE — 84100 ASSAY OF PHOSPHORUS: CPT

## 2019-07-06 PROCEDURE — 94761 N-INVAS EAR/PLS OXIMETRY MLT: CPT

## 2019-07-06 RX ORDER — NITROGLYCERIN 0.4 MG/1
0.4 TABLET SUBLINGUAL ONCE
Status: COMPLETED | OUTPATIENT
Start: 2019-07-06 | End: 2019-07-06

## 2019-07-06 RX ORDER — SUCRALFATE 1 G/10ML
1 SUSPENSION ORAL EVERY 6 HOURS
Status: DISCONTINUED | OUTPATIENT
Start: 2019-07-06 | End: 2019-07-15 | Stop reason: HOSPADM

## 2019-07-06 RX ORDER — HYDROCODONE BITARTRATE AND ACETAMINOPHEN 500; 5 MG/1; MG/1
TABLET ORAL
Status: DISCONTINUED | OUTPATIENT
Start: 2019-07-06 | End: 2019-07-15 | Stop reason: HOSPADM

## 2019-07-06 RX ORDER — SODIUM CHLORIDE 9 MG/ML
INJECTION, SOLUTION INTRAVENOUS CONTINUOUS
Status: DISCONTINUED | OUTPATIENT
Start: 2019-07-06 | End: 2019-07-07

## 2019-07-06 RX ADMIN — ALBUMIN (HUMAN) 12.5 G: 12.5 SOLUTION INTRAVENOUS at 06:07

## 2019-07-06 RX ADMIN — METOCLOPRAMIDE 10 MG: 10 TABLET ORAL at 05:07

## 2019-07-06 RX ADMIN — PANCRELIPASE 2 CAPSULE: 30000; 6000; 19000 CAPSULE, DELAYED RELEASE PELLETS ORAL at 11:07

## 2019-07-06 RX ADMIN — INSULIN ASPART 3 UNITS: 100 INJECTION, SOLUTION INTRAVENOUS; SUBCUTANEOUS at 05:07

## 2019-07-06 RX ADMIN — MIDODRINE HYDROCHLORIDE 2.5 MG: 2.5 TABLET ORAL at 07:07

## 2019-07-06 RX ADMIN — SODIUM CHLORIDE 82.5 ML: 0.9 INJECTION, SOLUTION INTRAVENOUS at 10:07

## 2019-07-06 RX ADMIN — INSULIN ASPART 3 UNITS: 100 INJECTION, SOLUTION INTRAVENOUS; SUBCUTANEOUS at 06:07

## 2019-07-06 RX ADMIN — HEPARIN SODIUM 5000 UNITS: 5000 INJECTION, SOLUTION INTRAVENOUS; SUBCUTANEOUS at 05:07

## 2019-07-06 RX ADMIN — ALBUMIN (HUMAN) 12.5 G: 12.5 SOLUTION INTRAVENOUS at 03:07

## 2019-07-06 RX ADMIN — EPOETIN ALFA-EPBX 10000 UNITS: 10000 INJECTION, SOLUTION INTRAVENOUS; SUBCUTANEOUS at 11:07

## 2019-07-06 RX ADMIN — DOPAMINE HYDROCHLORIDE IN DEXTROSE 2 MCG/KG/MIN: 1.6 INJECTION, SOLUTION INTRAVENOUS at 10:07

## 2019-07-06 RX ADMIN — METOCLOPRAMIDE 10 MG: 10 TABLET ORAL at 10:07

## 2019-07-06 RX ADMIN — SUCRALFATE 1 G: 1 SUSPENSION ORAL at 05:07

## 2019-07-06 RX ADMIN — HEPARIN SODIUM 5000 UNITS: 5000 INJECTION, SOLUTION INTRAVENOUS; SUBCUTANEOUS at 09:07

## 2019-07-06 RX ADMIN — TAMSULOSIN HYDROCHLORIDE 0.4 MG: 0.4 CAPSULE ORAL at 10:07

## 2019-07-06 RX ADMIN — INSULIN ASPART 3 UNITS: 100 INJECTION, SOLUTION INTRAVENOUS; SUBCUTANEOUS at 12:07

## 2019-07-06 RX ADMIN — ALBUMIN (HUMAN) 12.5 G: 12.5 SOLUTION INTRAVENOUS at 09:07

## 2019-07-06 RX ADMIN — ACETAMINOPHEN 650 MG: 325 TABLET ORAL at 11:07

## 2019-07-06 RX ADMIN — ALBUMIN (HUMAN) 12.5 G: 12.5 SOLUTION INTRAVENOUS at 02:07

## 2019-07-06 RX ADMIN — HEPARIN SODIUM 5000 UNITS: 5000 INJECTION, SOLUTION INTRAVENOUS; SUBCUTANEOUS at 02:07

## 2019-07-06 RX ADMIN — PANCRELIPASE 2 CAPSULE: 30000; 6000; 19000 CAPSULE, DELAYED RELEASE PELLETS ORAL at 07:07

## 2019-07-06 RX ADMIN — ASCORBIC ACID, VITAMIN A PALMITATE, CHOLECALCIFEROL, THIAMINE HYDROCHLORIDE, RIBOFLAVIN-5 PHOSPHATE SODIUM, PYRIDOXINE HYDROCHLORIDE, NIACINAMIDE, DEXPANTHENOL, ALPHA-TOCOPHEROL ACETATE, VITAMIN K1, FOLIC ACID, BIOTIN, CYANOCOBALAMIN: 200; 3300; 200; 6; 3.6; 6; 40; 15; 10; 150; 600; 60; 5 INJECTION, SOLUTION INTRAVENOUS at 09:07

## 2019-07-06 RX ADMIN — IRON SUCROSE 100 MG: 20 INJECTION, SOLUTION INTRAVENOUS at 06:07

## 2019-07-06 RX ADMIN — PANCRELIPASE 1 CAPSULE: 24000; 76000; 120000 CAPSULE, DELAYED RELEASE PELLETS ORAL at 07:07

## 2019-07-06 RX ADMIN — MIDODRINE HYDROCHLORIDE 2.5 MG: 2.5 TABLET ORAL at 05:07

## 2019-07-06 RX ADMIN — ONDANSETRON 4 MG: 2 INJECTION INTRAMUSCULAR; INTRAVENOUS at 09:07

## 2019-07-06 RX ADMIN — INSULIN ASPART 4 UNITS: 100 INJECTION, SOLUTION INTRAVENOUS; SUBCUTANEOUS at 11:07

## 2019-07-06 RX ADMIN — INSULIN ASPART 2 UNITS: 100 INJECTION, SOLUTION INTRAVENOUS; SUBCUTANEOUS at 11:07

## 2019-07-06 RX ADMIN — SUCRALFATE 1 G: 1 SUSPENSION ORAL at 11:07

## 2019-07-06 RX ADMIN — INSULIN ASPART 4 UNITS: 100 INJECTION, SOLUTION INTRAVENOUS; SUBCUTANEOUS at 07:07

## 2019-07-06 RX ADMIN — PANCRELIPASE 2 CAPSULE: 30000; 6000; 19000 CAPSULE, DELAYED RELEASE PELLETS ORAL at 05:07

## 2019-07-06 RX ADMIN — CLINDAMYCIN IN 5 PERCENT DEXTROSE 600 MG: 12 INJECTION, SOLUTION INTRAVENOUS at 09:07

## 2019-07-06 RX ADMIN — MIDODRINE HYDROCHLORIDE 2.5 MG: 2.5 TABLET ORAL at 11:07

## 2019-07-06 RX ADMIN — NITROGLYCERIN 0.4 MG: 0.4 TABLET, ORALLY DISINTEGRATING SUBLINGUAL at 04:07

## 2019-07-06 RX ADMIN — INSULIN ASPART 1 UNITS: 100 INJECTION, SOLUTION INTRAVENOUS; SUBCUTANEOUS at 11:07

## 2019-07-06 RX ADMIN — CLINDAMYCIN IN 5 PERCENT DEXTROSE 600 MG: 12 INJECTION, SOLUTION INTRAVENOUS at 05:07

## 2019-07-06 RX ADMIN — NITROGLYCERIN 0.4 MG: 0.4 TABLET, ORALLY DISINTEGRATING SUBLINGUAL at 10:07

## 2019-07-06 RX ADMIN — PANCRELIPASE 1 CAPSULE: 24000; 76000; 120000 CAPSULE, DELAYED RELEASE PELLETS ORAL at 05:07

## 2019-07-06 RX ADMIN — CLINDAMYCIN IN 5 PERCENT DEXTROSE 600 MG: 12 INJECTION, SOLUTION INTRAVENOUS at 02:07

## 2019-07-06 RX ADMIN — CYANOCOBALAMIN 1000 MCG: 1000 INJECTION, SOLUTION INTRAMUSCULAR at 10:07

## 2019-07-06 RX ADMIN — EPOETIN ALFA-EPBX 1600 UNITS: 2000 INJECTION, SOLUTION INTRAVENOUS; SUBCUTANEOUS at 11:07

## 2019-07-06 RX ADMIN — RAMELTEON 8 MG: 8 TABLET, FILM COATED ORAL at 09:07

## 2019-07-06 RX ADMIN — INSULIN ASPART 4 UNITS: 100 INJECTION, SOLUTION INTRAVENOUS; SUBCUTANEOUS at 05:07

## 2019-07-06 RX ADMIN — PANCRELIPASE 1 CAPSULE: 24000; 76000; 120000 CAPSULE, DELAYED RELEASE PELLETS ORAL at 11:07

## 2019-07-06 RX ADMIN — ATORVASTATIN CALCIUM 40 MG: 40 TABLET, FILM COATED ORAL at 10:07

## 2019-07-06 RX ADMIN — INSULIN DETEMIR 14 UNITS: 100 INJECTION, SOLUTION SUBCUTANEOUS at 09:07

## 2019-07-06 RX ADMIN — FAMOTIDINE 20 MG: 20 TABLET, FILM COATED ORAL at 10:07

## 2019-07-06 RX ADMIN — SODIUM CHLORIDE 1400 ML: 0.45 INJECTION, SOLUTION INTRAVENOUS at 09:07

## 2019-07-06 NOTE — CONSULTS
[facility]  Gastroenterology  Consult Note    Patient Name: Jian Arrieta  MRN: [id number]  Admission Date: 6/27/2019  Hospital Length of Stay: 9 days  Code Status: Full Code   Attending Provider: SON Rowe MD   Consulting Provider: Loy Mitchell MD  Primary Care Physician: Leon Barajas DO  Principal Problem:Portal hypertension    Inpatient consult to [facility]  Consult performed by: Loy Mitchell MD  Consult ordered by: Dora Espitia MD  Reason for consult: Anemia        Subjective:     HPI:  This is a 62yo male with a PMHx of etoh use, chronic pancreatitis here noted to have anemia. He denies any over GI bleeding, no melena/hematochezia. He has a h/o colon polyps on colonoscopy years ago. Recently he has been noted to have ascites, imaging c/w cirrhosis. He does not remember having an egd in the past. Some abdominal distention, mild, diffuse. No family history with liver disease.     The following portions of the patient's history were reviewed and updated as appropriate: allergies, current medications, past family history, past medical history, past social history, past surgical history and problem list.      Past Medical History:   Diagnosis Date    Alcohol abuse     Anasarca 1/28/2019    Arthropathy associated with neurological disorder 9/2/2015    Atherosclerosis     Charcot foot due to diabetes mellitus     Chronic combined systolic and diastolic heart failure 01/29/2019 1-28-19 Left VentricleModerate decreased ejection fraction at 30%. Normal left ventricular cavity size. Normal wall thickness observed. Grade I (mild) left ventricular diastolic dysfunction consistent with impaired relaxation. Normal left atrial pressure. Moderate, global hypokinesis(see wall scoring diagram). Right VentricleNormal cavity size, wall thickness and ejection fraction. Wall motion n    Chronic pancreatitis 1/28/2019    Colon polyps     approx 5 yrs ago     Coronary artery disease due to calcified coronary lesion 05/08/2015    5 stents on ASA      Diabetic polyneuropathy associated with type 2 diabetes mellitus 9/2/2015    Diverticulosis 1/28/2019    DM type 2 with diabetic peripheral neuropathy 2/4/2019    Encounter for blood transfusion     Essential hypertension 1/28/2019    Former smoker 8/26/2015    Healed ulcer of left foot on examination 6/20/2017    Hydrocele     approx 1.5 yrs ago    Hypoalbuminemia 2/4/2019    Lymphedema of both lower extremities 1/29/2019    Mixed hyperlipidemia 5/8/2015    Morbid obesity with BMI of 50.0-59.9, adult 5/8/2015    Onychomycosis of multiple toenails with type 2 diabetes mellitus and peripheral neuropathy 6/20/2017    Perianal cyst     approx 2 yrs ago    Pseudocyst of pancreas 1/28/2019 1-28-19 Liver has a cirrhotic morphology with no focal lesions.  Significant interval increase in ascites when compared to prior exam which may account for patient's abdominal distension.  Hypodense air-fluid collection along the body of the pancreas which is slightly smaller when compared to prior CT.  Findings may relate to pancreatic necrosis with pancreatic pseudocysts with infected pseudocyst    Skin cancer     skin cancer    Sleep apnea 8/26/2015    Status post bariatric surgery 1/11/2016    Type 2 diabetes mellitus, with long-term current use of insulin 5/8/2015       Past Surgical History:   Procedure Laterality Date    ANGIOGRAM, CORONARY ARTERY Right 3/20/2019    Per[facility] by [facility] CATH LAB    ANGIOGRAM-PV STENT N/A 6/28/2019    [facility]    ANGIOPLASTY      total x5 stents    Bypass graft study  3/20/2019    Performed by [facility] at [facility] CATH LAB    COLONOSCOPY N/A 10/6/2015    Performed by Shekhar Richards MD at [facility] ENDO (2ND FLR)    CORONARY ARTERY BYPASS GRAFT  2017    x3    CYST REMOVAL      GASTRECTOMY       GASTRECTOMY-SLEEVE-LAPAROSCOPIC - 13732 N/A 2015    Performed by Micheal Villavicencio Jr., [facility] at [facility] OR 2ND FLR    KNEE ARTHROSCOPY      perianal surgery      perianal cyst removed    pleurx N/A 2019    Performed by [facility] Provider at [facility] OR 2ND FLR       Review of patient's allergies indicates:  No Known Allergies  Family History     Problem Relation (Age of Onset)    Cancer Mother, Father, Paternal Grandfather, Brother    Diabetes Maternal Grandmother    Heart disease Father    No Known Problems Paternal Grandmother    Obesity Sister    Parkinsonism Brother    Stroke Maternal Grandfather        Tobacco Use    Smoking status: Former Smoker     Packs/day: 2.00     Years: 30.00     Pack years: 60.00     Types: Cigarettes     Last attempt to quit: 2005     Years since quittin.4    Smokeless tobacco: Never Used   Substance and Sexual Activity    Alcohol use: No     Comment: started ~, reports 1 shot daily, max 3 shots daily, vague about alcohol consumption. Last drink 2018    Drug use: No    Sexual activity: Not on file     Review of Systems   Constitutional: Positive for activity change. Negative for chills and fever.   HENT: Negative for postnasal drip and trouble swallowing.    Eyes: Negative for pain and visual disturbance.   Respiratory: Negative for choking and shortness of breath.    Cardiovascular: Negative for chest pain and leg swelling.   Gastrointestinal: Positive for abdominal distention. Negative for abdominal pain, anal bleeding, constipation, diarrhea, nausea, rectal pain and vomiting.   Endocrine: Negative for cold intolerance and heat intolerance.   Genitourinary: Negative for difficulty urinating and hematuria.   Musculoskeletal: Negative for gait problem and joint swelling.   Neurological: Negative for dizziness and numbness.   Hematological: Negative for adenopathy. Does not bruise/bleed easily.   Psychiatric/Behavioral: Negative for agitation and  confusion.     Objective:     Vital Signs (Most Recent):  Temp: 98.2 °F (36.8 °C) (07/06/19 1608)  Pulse: 105 (07/06/19 1700)  Resp: 16 (07/06/19 1700)  BP: 137/70 (07/06/19 1700)  SpO2: 98 % (07/06/19 1700) Vital Signs (24h Range):  Temp:  [97.9 °F (36.6 °C)-98.4 °F (36.9 °C)] 98.2 °F (36.8 °C)  Pulse:  [] 105  Resp:  [10-40] 16  SpO2:  [95 %-100 %] 98 %  BP: ()/(47-70) 137/70     Weight: 115.2 kg (253 lb 15.5 oz) (07/06/19 0600)  Body mass index is 39.78 kg/m².      Intake/Output Summary (Last 24 hours) at 7/6/2019 1754  Last data filed at 7/6/2019 1700  Gross per 24 hour   Intake 4212.5 ml   Output 2595 ml   Net 1617.5 ml       Lines/Drains/Airways     Peripherally Inserted Central Catheter Line                 PICC Double Lumen 05/17/19 1717  50 days          Drain                 Urethral Catheter 07/03/19 1753 Latex 14 Fr. 3 days          Peripheral Intravenous Line                 Peripheral IV - Single Lumen 07/06/19 1100 20 G Right Antecubital less than 1 day                Physical Exam   Constitutional: He is oriented to person, place, and time. He appears well-developed and well-nourished. No distress.   HENT:   Head: Normocephalic.   Eyes: Conjunctivae are normal. No scleral icterus.   Neck: No tracheal deviation present. No thyromegaly present.   Cardiovascular: Normal rate, regular rhythm and normal heart sounds. Exam reveals no gallop and no friction rub.   Pulmonary/Chest: Effort normal and breath sounds normal. He has no wheezes. He has no rales.   Abdominal: Soft. Bowel sounds are normal. He exhibits distension. There is no tenderness. There is no rebound and no guarding.   Musculoskeletal: Normal range of motion. He exhibits edema. He exhibits no tenderness.   Neurological: He is alert and oriented to person, place, and time.   Skin: He is not diaphoretic.   Psychiatric: He has a normal mood and affect. His behavior is normal.   Nursing note and vitals reviewed.      Significant  Labs:  CBC:   Recent Labs   Lab 07/05/19  0459 07/06/19  0610   WBC 4.94 4.85   HGB 7.0* 6.7*   HCT 22.2* 21.6*    221     CMP:   Recent Labs   Lab 07/06/19  0610   *   CALCIUM 8.5*   ALBUMIN 3.4*   PROT 4.8*      K 4.6   CO2 27      BUN 46*   CREATININE 4.7*   ALKPHOS 64   ALT 15   AST 14   BILITOT 0.4       Significant Imaging:  Imaging results within the past 24 hours have been reviewed.    Assessment/Plan:     Anemia  - will need egd, anemia and screening for varices  - monitor hct  - no overt bleeding    Other ascites  -suspect related to chronic underlying liver disease  - continue current management, paracenteses as needed        Thank you for your consult. I will follow-up with patient. Please contact us if you have any additional questions.    Loy Mitchell MD  Gastroenterology  [facility]

## 2019-07-06 NOTE — PLAN OF CARE
Problem: Adult Inpatient Plan of Care  Goal: Plan of Care Review  A&O x4. Vital signs stable. 1 unit of PRBCs given. One episode of chest pain this afternoon; relieved with one dose of nitroglycerin. PRN medications given for intermittent nausea. Urine output adequate. Wound care completed per order. Up to bedside commode and chair with assistance. Safety precautions in place. Pt remains free from fall/injury. Plan of care reviewed with patient and spouse.

## 2019-07-06 NOTE — ASSESSMENT & PLAN NOTE
-suspect related to chronic underlying liver disease  - continue current management, paracenteses as needed

## 2019-07-06 NOTE — NURSING
Pt complaining of chest pain, rated 4/10. Pt describes pain as a burning sensation in chest and back. No other symptoms noted. Notified Dr. Espitia and Dr. Resendez. Dr. Resendez stated to give one time dose of SL nitroglycerin.

## 2019-07-06 NOTE — HPI
This is a 64yo male with a PMHx of etoh use, chronic pancreatitis here noted to have anemia. He denies any over GI bleeding, no melena/hematochezia. He has a h/o colon polyps on colonoscopy years ago. Recently he has been noted to have ascites, imaging c/w cirrhosis. He does not remember having an egd in the past. Some abdominal distention, mild, diffuse. No family history with liver disease.     The following portions of the patient's history were reviewed and updated as appropriate: allergies, current medications, past family history, past medical history, past social history, past surgical history and problem list.

## 2019-07-06 NOTE — SUBJECTIVE & OBJECTIVE
Past Medical History:   Diagnosis Date    Alcohol abuse     Anasarca 1/28/2019    Arthropathy associated with neurological disorder 9/2/2015    Atherosclerosis     Charcot foot due to diabetes mellitus     Chronic combined systolic and diastolic heart failure 01/29/2019 1-28-19 Left VentricleModerate decreased ejection fraction at 30%. Normal left ventricular cavity size. Normal wall thickness observed. Grade I (mild) left ventricular diastolic dysfunction consistent with impaired relaxation. Normal left atrial pressure. Moderate, global hypokinesis(see wall scoring diagram). Right VentricleNormal cavity size, wall thickness and ejection fraction. Wall motion n    Chronic pancreatitis 1/28/2019    Colon polyps     approx 5 yrs ago    Coronary artery disease due to calcified coronary lesion 05/08/2015    5 stents on ASA      Diabetic polyneuropathy associated with type 2 diabetes mellitus 9/2/2015    Diverticulosis 1/28/2019    DM type 2 with diabetic peripheral neuropathy 2/4/2019    Encounter for blood transfusion     Essential hypertension 1/28/2019    Former smoker 8/26/2015    Healed ulcer of left foot on examination 6/20/2017    Hydrocele     approx 1.5 yrs ago    Hypoalbuminemia 2/4/2019    Lymphedema of both lower extremities 1/29/2019    Mixed hyperlipidemia 5/8/2015    Morbid obesity with BMI of 50.0-59.9, adult 5/8/2015    Onychomycosis of multiple toenails with type 2 diabetes mellitus and peripheral neuropathy 6/20/2017    Perianal cyst     approx 2 yrs ago    Pseudocyst of pancreas 1/28/2019 1-28-19 Liver has a cirrhotic morphology with no focal lesions.  Significant interval increase in ascites when compared to prior exam which may account for patient's abdominal distension.  Hypodense air-fluid collection along the body of the pancreas which is slightly smaller when compared to prior CT.  Findings may relate to pancreatic necrosis with pancreatic pseudocysts with infected  pseudocyst    Skin cancer     skin cancer    Sleep apnea 2015    Status post bariatric surgery 2016    Type 2 diabetes mellitus, with long-term current use of insulin 2015       Past Surgical History:   Procedure Laterality Date    ANGIOGRAM, CORONARY ARTERY Right 3/20/2019    Performed by Bob Duque MD at [facility] CATH LAB    ANGIOGRAM-PV STENT N/A 2019    Per[facility] by [facility] OR    ANGIOPLASTY      total x5 stents    Bypass graft study  3/20/2019    Performed by Bob Duque MD at [facility] CATH LAB    COLONOSCOPY N/A 10/6/2015    Performed by Shekhar Richards MD at [facility] ENDO (2ND FLR)    CORONARY ARTERY BYPASS GRAFT  2017    x3    CYST REMOVAL      GASTRECTOMY      GASTRECTOMY-SLEEVE-LAPAROSCOPIC - 28253 N/A 2015    Performed by Micheal Villavicencio Jr., MD at [facility] OR 2ND FLR    KNEE ARTHROSCOPY      perianal surgery      perianal cyst removed    pleurx N/A 2019    Performed by [facility] Diagnostic Provider at [facility] OR 2ND FLR       Review of patient's allergies indicates:  No Known Allergies  Family History     Problem Relation (Age of Onset)    Cancer Mother, Father, Paternal Grandfather, Brother    Diabetes Maternal Grandmother    Heart disease Father    No Known Problems Paternal Grandmother    Obesity Sister    Parkinsonism Brother    Stroke Maternal Grandfather        Tobacco Use    Smoking status: Former Smoker     Packs/day: 2.00     Years: 30.00     Pack years: 60.00     Types: Cigarettes     Last attempt to quit: 2005     Years since quittin.4    Smokeless tobacco: Never Used   Substance and Sexual Activity    Alcohol use: No     Comment: started ~, reports 1 shot daily, max 3 shots daily, vague about alcohol consumption. Last drink 2018    Drug use: No    Sexual activity: Not on file     Review of Systems   Constitutional: Positive for activity change. Negative for chills and fever.   HENT: Negative for postnasal  drip and trouble swallowing.    Eyes: Negative for pain and visual disturbance.   Respiratory: Negative for choking and shortness of breath.    Cardiovascular: Negative for chest pain and leg swelling.   Gastrointestinal: Positive for abdominal distention. Negative for abdominal pain, anal bleeding, constipation, diarrhea, nausea, rectal pain and vomiting.   Endocrine: Negative for cold intolerance and heat intolerance.   Genitourinary: Negative for difficulty urinating and hematuria.   Musculoskeletal: Negative for gait problem and joint swelling.   Neurological: Negative for dizziness and numbness.   Hematological: Negative for adenopathy. Does not bruise/bleed easily.   Psychiatric/Behavioral: Negative for agitation and confusion.     Objective:     Vital Signs (Most Recent):  Temp: 98.2 °F (36.8 °C) (07/06/19 1608)  Pulse: 105 (07/06/19 1700)  Resp: 16 (07/06/19 1700)  BP: 137/70 (07/06/19 1700)  SpO2: 98 % (07/06/19 1700) Vital Signs (24h Range):  Temp:  [97.9 °F (36.6 °C)-98.4 °F (36.9 °C)] 98.2 °F (36.8 °C)  Pulse:  [] 105  Resp:  [10-40] 16  SpO2:  [95 %-100 %] 98 %  BP: ()/(47-70) 137/70     Weight: 115.2 kg (253 lb 15.5 oz) (07/06/19 0600)  Body mass index is 39.78 kg/m².      Intake/Output Summary (Last 24 hours) at 7/6/2019 1754  Last data filed at 7/6/2019 1700  Gross per 24 hour   Intake 4212.5 ml   Output 2595 ml   Net 1617.5 ml       Lines/Drains/Airways     Peripherally Inserted Central Catheter Line                 PICC Double Lumen 05/17/19 1717  50 days          Drain                 Urethral Catheter 07/03/19 1753 Latex 14 Fr. 3 days          Peripheral Intravenous Line                 Peripheral IV - Single Lumen 07/06/19 1100 20 G Right Antecubital less than 1 day                Physical Exam   Constitutional: He is oriented to person, place, and time. He appears well-developed and well-nourished. No distress.   HENT:   Head: Normocephalic.   Eyes: Conjunctivae are normal. No  scleral icterus.   Neck: No tracheal deviation present. No thyromegaly present.   Cardiovascular: Normal rate, regular rhythm and normal heart sounds. Exam reveals no gallop and no friction rub.   Pulmonary/Chest: Effort normal and breath sounds normal. He has no wheezes. He has no rales.   Abdominal: Soft. Bowel sounds are normal. He exhibits distension. There is no tenderness. There is no rebound and no guarding.   Musculoskeletal: Normal range of motion. He exhibits edema. He exhibits no tenderness.   Neurological: He is alert and oriented to person, place, and time.   Skin: He is not diaphoretic.   Psychiatric: He has a normal mood and affect. His behavior is normal.   Nursing note and vitals reviewed.      Significant Labs:  CBC:   Recent Labs   Lab 07/05/19  0459 07/06/19  0610   WBC 4.94 4.85   HGB 7.0* 6.7*   HCT 22.2* 21.6*    221     CMP:   Recent Labs   Lab 07/06/19  0610   *   CALCIUM 8.5*   ALBUMIN 3.4*   PROT 4.8*      K 4.6   CO2 27      BUN 46*   CREATININE 4.7*   ALKPHOS 64   ALT 15   AST 14   BILITOT 0.4       Significant Imaging:  Imaging results within the past 24 hours have been reviewed.

## 2019-07-07 ENCOUNTER — ANESTHESIA EVENT (OUTPATIENT)
Dept: ENDOSCOPY | Facility: HOSPITAL | Age: 64
DRG: 981 | End: 2019-07-07
Payer: COMMERCIAL

## 2019-07-07 LAB
ALBUMIN SERPL BCP-MCNC: 3.4 G/DL (ref 3.5–5.2)
ALP SERPL-CCNC: 75 U/L (ref 55–135)
ALT SERPL W/O P-5'-P-CCNC: 19 U/L (ref 10–44)
ANION GAP SERPL CALC-SCNC: 8 MMOL/L (ref 8–16)
AST SERPL-CCNC: 19 U/L (ref 10–40)
BASOPHILS # BLD AUTO: 0.03 K/UL (ref 0–0.2)
BASOPHILS NFR BLD: 0.5 % (ref 0–1.9)
BILIRUB SERPL-MCNC: 0.4 MG/DL (ref 0.1–1)
BUN SERPL-MCNC: 46 MG/DL (ref 8–23)
CALCIUM SERPL-MCNC: 8.2 MG/DL (ref 8.7–10.5)
CHLORIDE SERPL-SCNC: 105 MMOL/L (ref 95–110)
CO2 SERPL-SCNC: 24 MMOL/L (ref 23–29)
CREAT SERPL-MCNC: 4.3 MG/DL (ref 0.5–1.4)
DIFFERENTIAL METHOD: ABNORMAL
EOSINOPHIL # BLD AUTO: 0.1 K/UL (ref 0–0.5)
EOSINOPHIL NFR BLD: 2 % (ref 0–8)
ERYTHROCYTE [DISTWIDTH] IN BLOOD BY AUTOMATED COUNT: 15.8 % (ref 11.5–14.5)
EST. GFR  (AFRICAN AMERICAN): 16 ML/MIN/1.73 M^2
EST. GFR  (NON AFRICAN AMERICAN): 14 ML/MIN/1.73 M^2
GLUCOSE SERPL-MCNC: 176 MG/DL (ref 70–110)
HCT VFR BLD AUTO: 25.9 % (ref 40–54)
HGB BLD-MCNC: 8.2 G/DL (ref 14–18)
INR PPP: 1.1 (ref 0.8–1.2)
LYMPHOCYTES # BLD AUTO: 1.1 K/UL (ref 1–4.8)
LYMPHOCYTES NFR BLD: 20.1 % (ref 18–48)
MAGNESIUM SERPL-MCNC: 2.1 MG/DL (ref 1.6–2.6)
MCH RBC QN AUTO: 28.9 PG (ref 27–31)
MCHC RBC AUTO-ENTMCNC: 31.7 G/DL (ref 32–36)
MCV RBC AUTO: 91 FL (ref 82–98)
MONOCYTES # BLD AUTO: 0.4 K/UL (ref 0.3–1)
MONOCYTES NFR BLD: 8.1 % (ref 4–15)
NEUTROPHILS # BLD AUTO: 3.7 K/UL (ref 1.8–7.7)
NEUTROPHILS NFR BLD: 69.3 % (ref 38–73)
PHOSPHATE SERPL-MCNC: 4.6 MG/DL (ref 2.7–4.5)
PLATELET # BLD AUTO: 196 K/UL (ref 150–350)
PMV BLD AUTO: 8.7 FL (ref 9.2–12.9)
POCT GLUCOSE: 153 MG/DL (ref 70–110)
POCT GLUCOSE: 158 MG/DL (ref 70–110)
POCT GLUCOSE: 217 MG/DL (ref 70–110)
POCT GLUCOSE: 221 MG/DL (ref 70–110)
POCT GLUCOSE: 238 MG/DL (ref 70–110)
POTASSIUM SERPL-SCNC: 4 MMOL/L (ref 3.5–5.1)
PROT SERPL-MCNC: 4.8 G/DL (ref 6–8.4)
PROTHROMBIN TIME: 11.4 SEC (ref 9–12.5)
RBC # BLD AUTO: 2.84 M/UL (ref 4.6–6.2)
SODIUM SERPL-SCNC: 137 MMOL/L (ref 136–145)
WBC # BLD AUTO: 5.46 K/UL (ref 3.9–12.7)

## 2019-07-07 PROCEDURE — P9047 ALBUMIN (HUMAN), 25%, 50ML: HCPCS | Mod: JG | Performed by: STUDENT IN AN ORGANIZED HEALTH CARE EDUCATION/TRAINING PROGRAM

## 2019-07-07 PROCEDURE — 85025 COMPLETE CBC W/AUTO DIFF WBC: CPT

## 2019-07-07 PROCEDURE — 20000000 HC ICU ROOM

## 2019-07-07 PROCEDURE — 97116 GAIT TRAINING THERAPY: CPT | Performed by: PHYSICAL THERAPIST

## 2019-07-07 PROCEDURE — 25000003 PHARM REV CODE 250: Performed by: SURGERY

## 2019-07-07 PROCEDURE — P9047 ALBUMIN (HUMAN), 25%, 50ML: HCPCS | Mod: JG | Performed by: SURGERY

## 2019-07-07 PROCEDURE — 99900035 HC TECH TIME PER 15 MIN (STAT)

## 2019-07-07 PROCEDURE — 63600175 PHARM REV CODE 636 W HCPCS: Mod: JG | Performed by: STUDENT IN AN ORGANIZED HEALTH CARE EDUCATION/TRAINING PROGRAM

## 2019-07-07 PROCEDURE — 80053 COMPREHEN METABOLIC PANEL: CPT

## 2019-07-07 PROCEDURE — 85610 PROTHROMBIN TIME: CPT

## 2019-07-07 PROCEDURE — 99232 PR SUBSEQUENT HOSPITAL CARE,LEVL II: ICD-10-PCS | Mod: ,,, | Performed by: INTERNAL MEDICINE

## 2019-07-07 PROCEDURE — 99232 SBSQ HOSP IP/OBS MODERATE 35: CPT | Mod: ,,, | Performed by: INTERNAL MEDICINE

## 2019-07-07 PROCEDURE — 84100 ASSAY OF PHOSPHORUS: CPT

## 2019-07-07 PROCEDURE — 63600175 PHARM REV CODE 636 W HCPCS: Performed by: STUDENT IN AN ORGANIZED HEALTH CARE EDUCATION/TRAINING PROGRAM

## 2019-07-07 PROCEDURE — 25000003 PHARM REV CODE 250

## 2019-07-07 PROCEDURE — 25000003 PHARM REV CODE 250: Performed by: STUDENT IN AN ORGANIZED HEALTH CARE EDUCATION/TRAINING PROGRAM

## 2019-07-07 PROCEDURE — 63600175 PHARM REV CODE 636 W HCPCS: Mod: JG | Performed by: SURGERY

## 2019-07-07 PROCEDURE — 94761 N-INVAS EAR/PLS OXIMETRY MLT: CPT

## 2019-07-07 PROCEDURE — 83735 ASSAY OF MAGNESIUM: CPT

## 2019-07-07 PROCEDURE — S0077 INJECTION, CLINDAMYCIN PHOSP: HCPCS

## 2019-07-07 PROCEDURE — 97110 THERAPEUTIC EXERCISES: CPT | Performed by: PHYSICAL THERAPIST

## 2019-07-07 RX ORDER — SODIUM CHLORIDE 450 MG/100ML
INJECTION, SOLUTION INTRAVENOUS CONTINUOUS
Status: DISCONTINUED | OUTPATIENT
Start: 2019-07-07 | End: 2019-07-08

## 2019-07-07 RX ORDER — ALBUMIN HUMAN 250 G/1000ML
12.5 SOLUTION INTRAVENOUS CONTINUOUS
Status: DISCONTINUED | OUTPATIENT
Start: 2019-07-07 | End: 2019-07-11

## 2019-07-07 RX ORDER — NITROGLYCERIN 0.4 MG/1
0.4 TABLET SUBLINGUAL ONCE
Status: COMPLETED | OUTPATIENT
Start: 2019-07-07 | End: 2019-07-07

## 2019-07-07 RX ADMIN — CLINDAMYCIN IN 5 PERCENT DEXTROSE 600 MG: 12 INJECTION, SOLUTION INTRAVENOUS at 03:07

## 2019-07-07 RX ADMIN — HEPARIN SODIUM 5000 UNITS: 5000 INJECTION, SOLUTION INTRAVENOUS; SUBCUTANEOUS at 09:07

## 2019-07-07 RX ADMIN — TAMSULOSIN HYDROCHLORIDE 0.4 MG: 0.4 CAPSULE ORAL at 09:07

## 2019-07-07 RX ADMIN — HEPARIN SODIUM 5000 UNITS: 5000 INJECTION, SOLUTION INTRAVENOUS; SUBCUTANEOUS at 06:07

## 2019-07-07 RX ADMIN — MIDODRINE HYDROCHLORIDE 2.5 MG: 2.5 TABLET ORAL at 07:07

## 2019-07-07 RX ADMIN — IRON SUCROSE 100 MG: 20 INJECTION, SOLUTION INTRAVENOUS at 06:07

## 2019-07-07 RX ADMIN — INSULIN ASPART 4 UNITS: 100 INJECTION, SOLUTION INTRAVENOUS; SUBCUTANEOUS at 04:07

## 2019-07-07 RX ADMIN — CLINDAMYCIN IN 5 PERCENT DEXTROSE 600 MG: 12 INJECTION, SOLUTION INTRAVENOUS at 06:07

## 2019-07-07 RX ADMIN — ATORVASTATIN CALCIUM 40 MG: 40 TABLET, FILM COATED ORAL at 09:07

## 2019-07-07 RX ADMIN — SUCRALFATE 1 G: 1 SUSPENSION ORAL at 05:07

## 2019-07-07 RX ADMIN — METOPROLOL SUCCINATE 25 MG: 25 TABLET, FILM COATED, EXTENDED RELEASE ORAL at 09:07

## 2019-07-07 RX ADMIN — CLINDAMYCIN IN 5 PERCENT DEXTROSE 600 MG: 12 INJECTION, SOLUTION INTRAVENOUS at 09:07

## 2019-07-07 RX ADMIN — INSULIN ASPART 4 UNITS: 100 INJECTION, SOLUTION INTRAVENOUS; SUBCUTANEOUS at 11:07

## 2019-07-07 RX ADMIN — ASCORBIC ACID, VITAMIN A PALMITATE, CHOLECALCIFEROL, THIAMINE HYDROCHLORIDE, RIBOFLAVIN-5 PHOSPHATE SODIUM, PYRIDOXINE HYDROCHLORIDE, NIACINAMIDE, DEXPANTHENOL, ALPHA-TOCOPHEROL ACETATE, VITAMIN K1, FOLIC ACID, BIOTIN, CYANOCOBALAMIN: 200; 3300; 200; 6; 3.6; 6; 40; 15; 10; 150; 600; 60; 5 INJECTION, SOLUTION INTRAVENOUS at 08:07

## 2019-07-07 RX ADMIN — SODIUM CHLORIDE: 0.45 INJECTION, SOLUTION INTRAVENOUS at 11:07

## 2019-07-07 RX ADMIN — INSULIN ASPART 2 UNITS: 100 INJECTION, SOLUTION INTRAVENOUS; SUBCUTANEOUS at 06:07

## 2019-07-07 RX ADMIN — SUCRALFATE 1 G: 1 SUSPENSION ORAL at 11:07

## 2019-07-07 RX ADMIN — PANCRELIPASE 1 CAPSULE: 24000; 76000; 120000 CAPSULE, DELAYED RELEASE PELLETS ORAL at 04:07

## 2019-07-07 RX ADMIN — HEPARIN SODIUM 5000 UNITS: 5000 INJECTION, SOLUTION INTRAVENOUS; SUBCUTANEOUS at 03:07

## 2019-07-07 RX ADMIN — ALBUMIN (HUMAN) 12.5 G: 12.5 SOLUTION INTRAVENOUS at 06:07

## 2019-07-07 RX ADMIN — PANCRELIPASE 1 CAPSULE: 24000; 76000; 120000 CAPSULE, DELAYED RELEASE PELLETS ORAL at 11:07

## 2019-07-07 RX ADMIN — INSULIN ASPART 4 UNITS: 100 INJECTION, SOLUTION INTRAVENOUS; SUBCUTANEOUS at 08:07

## 2019-07-07 RX ADMIN — ALBUMIN (HUMAN) 12.5 G: 12.5 SOLUTION INTRAVENOUS at 12:07

## 2019-07-07 RX ADMIN — ALBUMIN HUMAN 12.5 G: 0.25 SOLUTION INTRAVENOUS at 08:07

## 2019-07-07 RX ADMIN — METOCLOPRAMIDE 10 MG: 10 TABLET ORAL at 04:07

## 2019-07-07 RX ADMIN — PANCRELIPASE 2 CAPSULE: 30000; 6000; 19000 CAPSULE, DELAYED RELEASE PELLETS ORAL at 04:07

## 2019-07-07 RX ADMIN — METOCLOPRAMIDE 10 MG: 10 TABLET ORAL at 11:07

## 2019-07-07 RX ADMIN — INSULIN DETEMIR 14 UNITS: 100 INJECTION, SOLUTION SUBCUTANEOUS at 09:07

## 2019-07-07 RX ADMIN — NITROGLYCERIN 0.4 MG: 0.4 TABLET, ORALLY DISINTEGRATING SUBLINGUAL at 12:07

## 2019-07-07 RX ADMIN — ISOSORBIDE MONONITRATE 30 MG: 30 TABLET, EXTENDED RELEASE ORAL at 09:07

## 2019-07-07 RX ADMIN — METOCLOPRAMIDE 10 MG: 10 TABLET ORAL at 06:07

## 2019-07-07 RX ADMIN — MIDODRINE HYDROCHLORIDE 2.5 MG: 2.5 TABLET ORAL at 12:07

## 2019-07-07 RX ADMIN — FAMOTIDINE 20 MG: 20 TABLET, FILM COATED ORAL at 09:07

## 2019-07-07 RX ADMIN — INSULIN ASPART 2 UNITS: 100 INJECTION, SOLUTION INTRAVENOUS; SUBCUTANEOUS at 04:07

## 2019-07-07 RX ADMIN — SUCRALFATE 1 G: 1 SUSPENSION ORAL at 06:07

## 2019-07-07 RX ADMIN — ALBUMIN HUMAN 12.5 G: 0.25 SOLUTION INTRAVENOUS at 10:07

## 2019-07-07 RX ADMIN — MIDODRINE HYDROCHLORIDE 2.5 MG: 2.5 TABLET ORAL at 06:07

## 2019-07-07 RX ADMIN — PANCRELIPASE 2 CAPSULE: 30000; 6000; 19000 CAPSULE, DELAYED RELEASE PELLETS ORAL at 07:07

## 2019-07-07 RX ADMIN — PANCRELIPASE 2 CAPSULE: 30000; 6000; 19000 CAPSULE, DELAYED RELEASE PELLETS ORAL at 11:07

## 2019-07-07 RX ADMIN — PANCRELIPASE 1 CAPSULE: 24000; 76000; 120000 CAPSULE, DELAYED RELEASE PELLETS ORAL at 08:07

## 2019-07-07 RX ADMIN — CYANOCOBALAMIN 1000 MCG: 1000 INJECTION, SOLUTION INTRAMUSCULAR at 09:07

## 2019-07-07 RX ADMIN — EPOETIN ALFA-EPBX 10000 UNITS: 10000 INJECTION, SOLUTION INTRAVENOUS; SUBCUTANEOUS at 10:07

## 2019-07-07 RX ADMIN — ERYTHROPOIETIN 1600 UNITS: 2000 INJECTION, SOLUTION INTRAVENOUS; SUBCUTANEOUS at 10:07

## 2019-07-07 NOTE — SUBJECTIVE & OBJECTIVE
Subjective:     Interval History: Feels ok, no melena. Sophy diet. No abdominal pain currently.     Review of Systems   Constitutional: Negative for appetite change and unexpected weight change.   Respiratory: Negative for apnea and chest tightness.    Cardiovascular: Negative for chest pain and palpitations.   Gastrointestinal: Negative for abdominal distention and abdominal pain.     Objective:     Vital Signs (Most Recent):  Temp: 98.1 °F (36.7 °C) (07/07/19 1102)  Pulse: 104 (07/07/19 1402)  Resp: 12 (07/07/19 1402)  BP: 110/61 (07/07/19 1402)  SpO2: 100 % (07/07/19 1402) Vital Signs (24h Range):  Temp:  [98 °F (36.7 °C)-98.4 °F (36.9 °C)] 98.1 °F (36.7 °C)  Pulse:  [101-127] 104  Resp:  [5-38] 12  SpO2:  [89 %-100 %] 100 %  BP: ()/(49-81) 110/61     Weight: 120.1 kg (264 lb 12.4 oz) (07/07/19 0600)  Body mass index is 41.47 kg/m².      Intake/Output Summary (Last 24 hours) at 7/7/2019 1413  Last data filed at 7/7/2019 1402  Gross per 24 hour   Intake 3838.97 ml   Output 2610 ml   Net 1228.97 ml       Lines/Drains/Airways     Peripherally Inserted Central Catheter Line                 PICC Double Lumen 05/17/19 1717  50 days          Drain                 Urethral Catheter 07/03/19 1753 Latex 14 Fr. 3 days          Peripheral Intravenous Line                 Peripheral IV - Single Lumen 07/06/19 1100 20 G Right Antecubital 1 day                Physical Exam   Constitutional: He is oriented to person, place, and time. He appears well-developed and well-nourished. No distress.   HENT:   Head: Normocephalic.   Eyes: Conjunctivae are normal. No scleral icterus.   Cardiovascular: Normal rate, regular rhythm and normal heart sounds. Exam reveals no gallop and no friction rub.   Pulmonary/Chest: Effort normal and breath sounds normal. He has no wheezes. He has no rales.   Abdominal: Soft. Bowel sounds are normal. He exhibits distension. There is no tenderness. There is no rebound and no guarding.    Musculoskeletal: Normal range of motion. He exhibits edema. He exhibits no tenderness.   Neurological: He is alert and oriented to person, place, and time.   Skin: He is not diaphoretic.   Psychiatric: He has a normal mood and affect. His behavior is normal.   Nursing note and vitals reviewed.      Significant Labs:  CBC:   Recent Labs   Lab 07/06/19  0610 07/07/19  0607   WBC 4.85 5.46   HGB 6.7* 8.2*   HCT 21.6* 25.9*    196         Significant Imaging:  Imaging results within the past 24 hours have been reviewed.

## 2019-07-07 NOTE — PROGRESS NOTES
"NEUROENDOCRINE SURGERY PROGRESS NOTE    63yoM with multiple comorbidities with extrahepatic PV occlusion plus mild cirrhosis with CRI and mild CHF contributing to recalcitrant ascites plus non-compliance with sodium/fluids as well as fatigue due to hypokalemia and fluid overload, now s/p paracentesis, transhepatic portal venogram, pressure measurements, and venoplasty with IR 6/28/19.     INTERVAL HISTORY  No acute events  Continues to have periodic "chest pain" (unsure if true angina or acid reflux) that seems to improve with NTG  UOP good, replacing output with 1/2NS    Temp:  [97.9 °F (36.6 °C)-98.4 °F (36.9 °C)] 98 °F (36.7 °C)  Pulse:  [] 108  Resp:  [5-38] 18  SpO2:  [89 %-100 %] 98 %  BP: ()/(47-81) 117/76    I/O last 3 completed shifts:  In: 4863 [P.O.:390; I.V.:1099.5; Blood:350; IV Piggyback:1600]  Out: 4105 [Urine:4105]    EXAM  GEN: A&Ox3, NAD  CV: Tachycardia in low 100s  RESP: Non-labored breathing  ABD: Soft, NT/ND.   EXT: BLE with improved edema  : George in place with clear yellow urine    LABS  All labs reviewed  WBC 4  H/H 6.7/21  Lytes ok  Cr 4.7 (from 4.9)    IMAGING  No new imaging    ASSESSMENT/PLAN  63yoM with multiple comorbidities with extrahepatic PV occlusion plus mild cirrhosis with CRI and mild CHF contributing to recalcitrant ascites plus non-compliance with sodium/fluids as well as fatigue due to hypokalemia and fluid overload, now s/p paracentesis, transhepatic portal venogram, pressure measurements, and venoplasty with IR 6/28/19.     - NEURO: continue pain control prn  - CV: Continues to have episodic chest pain and PVCs on monitor - cardiology consulted, no additional recommendations, signed off. Continue to monitor.   - RESP: stable, continue IS, duonebs prn  - FEN/GI: low potassium diabetic diet, on Clinimix at 50 ml/hr.   - RENAL: UOP good. Cr improved slightly. Continue to replace UOP with 1/2NS  - HEME: H/H dropped - will transfuse 1 unit PRBC today  - ID: IV " clindamycin, transition to PO prior to discharge  - ENDOCRINE: CBG elevated, will resume pm levemir at 14 u (home dose) and 4u novolog with meals.   - MSK: Ambulate with PT/OT, elevate legs, wound care to open wounds   - PPX: hep subcu TID, famotidine     Dora Espitia MD  [employer] General Surgery PGY-4

## 2019-07-07 NOTE — ANESTHESIA PREPROCEDURE EVALUATION
07/08/2019  Jian Arrieta is a 63 y.o., male in ICU admitted 6/28/19  for EGD under MAC. EF 45%, morbidly obese with sleep apnea.    ETT note 2015: mask vent mod difficulty, Gr 1v with Butcher, short neck, obesity, 1 attempt    Past Medical History:   Diagnosis Date    Alcohol abuse     Anasarca 1/28/2019    Arthropathy associated with neurological disorder 9/2/2015    Atherosclerosis     Charcot foot due to diabetes mellitus     Chronic combined systolic and diastolic heart failure 01/29/2019 1-28-19 Left VentricleModerate decreased ejection fraction at 30%. Normal left ventricular cavity size. Normal wall thickness observed. Grade I (mild) left ventricular diastolic dysfunction consistent with impaired relaxation. Normal left atrial pressure. Moderate, global hypokinesis(see wall scoring diagram). Right VentricleNormal cavity size, wall thickness and ejection fraction. Wall motion n    Chronic pancreatitis 1/28/2019    Colon polyps     approx 5 yrs ago    Coronary artery disease due to calcified coronary lesion 05/08/2015    5 stents on ASA      Diabetic polyneuropathy associated with type 2 diabetes mellitus 9/2/2015    Diverticulosis 1/28/2019    DM type 2 with diabetic peripheral neuropathy 2/4/2019    Encounter for blood transfusion     Essential hypertension 1/28/2019    Former smoker 8/26/2015    Healed ulcer of left foot on examination 6/20/2017    Hydrocele     approx 1.5 yrs ago    Hypoalbuminemia 2/4/2019    Lymphedema of both lower extremities 1/29/2019    Mixed hyperlipidemia 5/8/2015    Morbid obesity with BMI of 50.0-59.9, adult 5/8/2015    Onychomycosis of multiple toenails with type 2 diabetes mellitus and peripheral neuropathy 6/20/2017    Perianal cyst     approx 2 yrs ago    Pseudocyst of pancreas 1/28/2019 1-28-19 Liver has a cirrhotic morphology with no  focal lesions.  Significant interval increase in ascites when compared to prior exam which may account for patient's abdominal distension.  Hypodense air-fluid collection along the body of the pancreas which is slightly smaller when compared to prior CT.  Findings may relate to pancreatic necrosis with pancreatic pseudocysts with infected pseudocyst    Skin cancer     skin cancer    Sleep apnea 8/26/2015    Status post bariatric surgery 1/11/2016    Type 2 diabetes mellitus, with long-term current use of insulin 5/8/2015     Past Surgical History:   Procedure Laterality Date    ANGIOGRAM, CORONARY ARTERY Right 3/20/2019    Per[facility] at [facility] CATH LAB    ANGIOGRAM-PV STENT N/A 6/28/2019    Performed by [facility] at [facility] OR    ANGIOPLASTY      total x5 stents    Bypass graft study  3/20/2019    Performed by Bob [facility]que MD at [facility] CATH LAB    COLONOSCOPY N/A 10/6/2015    Performed by Shekhar Richards MD at [facility] ENDO (2ND FLR)    CORONARY ARTERY BYPASS GRAFT  2017    x3    CYST REMOVAL      GASTRECTOMY      GASTRECTOMY-SLEEVE-LAPAROSCOPIC - 71509 N/A 12/22/2015    Performed by Micheal Villavicencio Jr., MD at [facility] OR 2ND FLR    KNEE ARTHROSCOPY      perianal surgery      perianal cyst removed    pleurx N/A 5/17/2019    Performed by [facility] at [facility] OR 2ND FLR       Wt Readings from Last 3 Encounters:   07/07/19 120.1 kg (264 lb 12.4 oz)   06/27/19 123.8 kg (273 lb)   06/27/19 123.8 kg (273 lb)     Temp Readings from Last 3 Encounters:   07/08/19 37.3 °C (99.2 °F) (Oral)   06/27/19 36.3 °C (97.4 °F)   06/21/19 37 °C (98.6 °F) (Oral)     BP Readings from Last 3 Encounters:   07/08/19 (!) 122/56   06/27/19 137/67   06/25/19 (!) 115/58     Pulse Readings from Last 3 Encounters:   07/08/19 101   06/27/19 100   06/25/19 83         Pre-op Assessment    I have reviewed the Patient Summary Reports.     I have reviewed the Nursing Notes.   I have reviewed  the Medications.     Review of Systems  Anesthesia Hx:  No problems with previous Anesthesia    Social:  Former Smoker, Alcohol Use    Hematology/Oncology:         -- Anemia:   Cardiovascular:   Exercise tolerance: poor Hypertension, well controlled Past MI CAD  CABG/stent (3V CABG)  CHF (recovered EF 55-60%) PVD hyperlipidemia CHURCH NYHA Classification III ECG has been reviewed. EKG: SR w/ fusion complexes, anterolateral q waves    METS <4 2/2 to CHURCH   Pulmonary:   Sleep Apnea, CPAP    Renal/:   Chronic Renal Disease, CRI CKD 4   Hepatic/GI:   Liver Disease, (Fatty liver, hepatic fibrosis)    Neurological:   Peripheral Neuropathy (diabetic peripheral neuropathy)    Endocrine:   Diabetes, poorly controlled, using insulin    Psych:   Psychiatric History Chronic ETOH abuse, last drink 8 months ago per patient report         Physical Exam  General:  Well nourished, Morbid Obesity    Airway/Jaw/Neck:  Airway Findings: Mouth Opening: Normal Tongue: Normal  General Airway Assessment: Adult  Mallampati: II  Improves to II with phonation.  TM Distance: Normal, at least 6 cm  Jaw/Neck Findings:     Neck ROM: Normal ROM      Dental:  Dental Findings: (Multiple missing and decaying teeth)   Chest/Lungs:  Chest/Lungs Findings: Clear to auscultation, Normal Respiratory Rate     Heart/Vascular:  Heart Findings: Rate: Normal  Rhythm: Regular Rhythm  Sounds: Normal        Mental Status:  Mental Status Findings:  Cooperative, Alert and Oriented       Lab Results   Component Value Date    WBC 5.39 07/08/2019    HGB 7.1 (L) 07/08/2019    HCT 22.7 (L) 07/08/2019    MCV 93 07/08/2019     07/08/2019       Chemistry        Component Value Date/Time     07/08/2019 0530    K 4.1 07/08/2019 0530     07/08/2019 0530    CO2 24 07/08/2019 0530    BUN 44 (H) 07/08/2019 0530    CREATININE 4.2 (H) 07/08/2019 0530     (H) 07/08/2019 0530        Component Value Date/Time    CALCIUM 8.2 (L) 07/08/2019 0530    ALKPHOS 81  07/08/2019 0530    AST 25 07/08/2019 0530    ALT 21 07/08/2019 0530    BILITOT 0.4 07/08/2019 0530    ESTGFRAFRICA 16 (A) 07/08/2019 0530    EGFRNONAA 14 (A) 07/08/2019 0530      Conclusion     · Mildly decreased left ventricular systolic function. The estimated ejection fraction is 45-50%  · Left ventricular diastolic dysfunction.  · Normal right ventricular systolic function.  · Normal central venous pressure (3 mm Hg).  · Extremely technically challenging study, poor endocardial visualization, would recommend repeating with contrast if additional information is desired.   Electronically signed by Luisito Joiner MD on 6/27/19 at 1359 CDT     Echo 6/27/19  · Mildly decreased left ventricular systolic function. The estimated ejection fraction is 45-50%  · Left ventricular diastolic dysfunction.  · Normal right ventricular systolic function.  · Normal central venous pressure (3 mm Hg).  · Extremely technically challenging study, poor endocardial visualization, would recommend repeating with contrast if additional information is desired.           Anesthesia Plan  Type of Anesthesia, risks & benefits discussed:  Anesthesia Type:  MAC, general  Patient's Preference:   Intra-op Monitoring Plan:   Intra-op Monitoring Plan Comments:   Post Op Pain Control Plan:   Post Op Pain Control Plan Comments:   Induction:   IV  Beta Blocker:  Patient is on a Beta-Blocker and has received one dose within the past 24 hours (No further documentation required).       Informed Consent: Patient understands risks and agrees with Anesthesia plan.  Questions answered. Anesthesia consent signed with patient.  ASA Score: 4     Day of Surgery Review of History & Physical: I have interviewed and examined the patient. I have reviewed the patient's H&P dated:  There are no significant changes.  H&P update referred to the provider.  H&P completed by Anesthesiologist.       Ready For Surgery From Anesthesia Perspective.

## 2019-07-07 NOTE — PROGRESS NOTES
NEUROENDOCRINE SURGERY PROGRESS NOTE    63yoM with multiple comorbidities with extrahepatic PV occlusion plus mild cirrhosis with CRI and mild CHF contributing to recalcitrant ascites plus non-compliance with sodium/fluids as well as fatigue due to hypokalemia and fluid overload, now s/p paracentesis, transhepatic portal venogram, pressure measurements, and venoplasty with IR 6/28/19.     INTERVAL HISTORY  No acute events  Given NTG x2 overnight for chest pain.   Patient reports that pain occurred after eating.   Seen by GI yesterday - plans for EGD this week.   1 unit PRBC given yesterday with appropriate response    Temp:  [97.9 °F (36.6 °C)-98.4 °F (36.9 °C)] 98 °F (36.7 °C)  Pulse:  [] 108  Resp:  [5-38] 18  SpO2:  [89 %-100 %] 98 %  BP: ()/(47-81) 117/76    I/O last 3 completed shifts:  In: 4863 [P.O.:390; I.V.:1099.5; Blood:350; IV Piggyback:1600]  Out: 4105 [Urine:4105]    EXAM  GEN: A&Ox3, NAD  CV: Sinus tachycardia - 110s  RESP: Non-labored breathing  ABD: Soft, NT/ND.   EXT: BLE with improved edema  : George in place with clear yellow urine    LABS  All labs reviewed  WBC 5  H/H 8.2/25    Lytes ok  Cr 4.3 (downtrending)    IMAGING  No new imaging    ASSESSMENT/PLAN  63yoM with multiple comorbidities with extrahepatic PV occlusion plus mild cirrhosis with CRI and mild CHF contributing to recalcitrant ascites plus non-compliance with sodium/fluids as well as fatigue due to hypokalemia and fluid overload, now s/p paracentesis, transhepatic portal venogram, pressure measurements, and venoplasty with IR 6/28/19.     NEURO: continue pain control prn  RESP: stable, continue IS, duonebs prn  CV: Continues to have episodic atypical chest pain and PVCs on monitor - cardiology consulted, no additional recommendations, signed off. Pain does not seem cardiac in origin. Continue to monitor. EKG without concerning changes per cards, troponins have been negative.   RENAL: UOP remains high, replacing  q2h with 1/2NS. Cr continues to downtrend. Stop dopamine gtt today.  FEN/GI: Low potassium diabetic diet, on Clinimix at 50 ml/hr. EGD this week per GI.   HEME: H/H responded appropriately to 1 unit PRBC. Continue to monitor with daily CBC.   ID: No fever or leukocytosis. IV clindamycin, transition to PO prior to discharge  ENDOCRINE: CBG elevated, will resume pm levemir at 14 u (home dose) and 4u novolog with meals.   MSK: Ambulate with PT/OT, elevate legs, wound care to open wounds     PPX: SQH, Pepcid  DISPO: Continue ICU care. Possible transfer to floor tomorrow.     Dora Espitia MD  [employer] General Surgery PGY-4

## 2019-07-07 NOTE — PLAN OF CARE
Problem: Physical Therapy Goal  Goal: Physical Therapy Goal  Goals to be met by: 2019    Patient will increase functional independence with mobility by performin. Sit to supine with Mod I  2. Sit to stand transfer with Mod I  3. Bed to chair transfer with Supervision using Rolling Walker  3. Gait  x50 feet with Supervision using Rolling Walker.   4. Lower extremity exercise program x12 reps per handout, with supervision       Outcome: Ongoing (interventions implemented as appropriate)  Pt would benefit from skilled PT services to improve activity tolerance.

## 2019-07-07 NOTE — PT/OT/SLP PROGRESS
Physical Therapy Treatment    Patient Name:  Jian Arrieta   MRN:  [id number]    Recommendations:     Discharge Recommendations:  nursing facility, skilled   Discharge Equipment Recommendations: (TBD)   Barriers to discharge: Inaccessible home and decreased functional mobility and decreased caregiver availability    Assessment:     Jian Arrieta is a 63 y.o. male admitted with a medical diagnosis of Portal hypertension.  He presents with the following impairments/functional limitations:  weakness, impaired functional mobilty, impaired cardiopulmonary response to activity, impaired endurance, impaired self care skills, gait instability, decreased lower extremity function, impaired joint extensibility. Pt marched in place with RW 15 x 1 and 20 x 1 with CG, limited by SOB.    Rehab Prognosis: Fair; patient would benefit from acute skilled PT services to address these deficits and reach maximum level of function.    Recent Surgery: Procedure(s) (LRB):  ANGIOGRAM-PV STENT (N/A) 9 Days Post-Op    Plan:     During this hospitalization, patient to be seen 6 x/week to address the identified rehab impairments via gait training, therapeutic activities, therapeutic exercises, neuromuscular re-education and progress toward the following goals:    · Plan of Care Expires:  08/07/19    Subjective     Chief Complaint: too tired to try partial squats  Patient/Family Comments/goals: be able to get up steps to go home  Pain/Comfort:  ·        Objective:     Communicated with nurse prior to session.  Patient found up in chair with blood pressure cuff, peripheral IV, telemetry, mathew catheter upon PT entry to room.     General Precautions: Standard, fall, hearing impaired   Orthopedic Precautions:N/A   Braces:       Functional Mobility:  · Transfers:     · Sit to Stand:  modified independence with rolling walker  · Gait: Pt marched in place 15 x 1 and 20 x 1 with RW with wide CLEO with CG  · Balance: Pt has F+ dynamic standing balance with  RW      AM-PAC 6 CLICK MOBILITY  Turning over in bed (including adjusting bedclothes, sheets and blankets)?: 4  Sitting down on and standing up from a chair with arms (e.g., wheelchair, bedside commode, etc.): 4  Moving from lying on back to sitting on the side of the bed?: 3  Moving to and from a bed to a chair (including a wheelchair)?: 3  Need to walk in hospital room?: 3  Climbing 3-5 steps with a railing?: 2  Basic Mobility Total Score: 19       Therapeutic Activities and Exercises:    Pt marched in place with RW 15 x 1 and 20 x 1 with CG, limited by SOB  Pt performed BLE AP, LAQ, hip flexion and hip abd seated 15 x 2.      Patient left up in chair with all lines intact, call button in reach and nurse notified..    GOALS:   Multidisciplinary Problems     Physical Therapy Goals        Problem: Physical Therapy Goal    Goal Priority Disciplines Outcome Goal Variances Interventions   Physical Therapy Goal     PT, PT/OT Ongoing (interventions implemented as appropriate)     Description:  Goals to be met by: 2019    Patient will increase functional independence with mobility by performin. Sit to supine with Mod I  2. Sit to stand transfer with Mod I  3. Bed to chair transfer with Supervision using Rolling Walker  3. Gait  x50 feet with Supervision using Rolling Walker.   4. Lower extremity exercise program x12 reps per handout, with supervision                        Time Tracking:     PT Received On: 19  PT Start Time: 1356     PT Stop Time: 1426  PT Total Time (min): 30 min     Billable Minutes: Gait Training 15 and Therapeutic Exercise 15    Treatment Type: Treatment  PT/PTA: PT     PTA Visit Number: 0     Lizz Saldivar, PT  2019

## 2019-07-07 NOTE — NURSING
Notified  MD Omkar of pt BP 79/50 Map(60). Pt asymptomatic. No new orders received. Refer to VS for further details.

## 2019-07-07 NOTE — PLAN OF CARE
Problem: Adult Inpatient Plan of Care  Goal: Plan of Care Review  Outcome: Ongoing (interventions implemented as appropriate)  POC reviewed with pt and family at 1500. Pt verbalized understanding. Questions and concerns addressed. No acute events today. Pt up to chair for five hours today. TPN at 50cc/hr. Continue to replace 1/2 of urine output q2h with 0.45%NS. Albumin Gtt @ 5cc/hr. Plan for NPO at midnight, EGD scheduled for 7/8/19 AM. Pt progressing toward goals. Will continue to monitor. See flowsheets for full assessment and VS info.

## 2019-07-08 ENCOUNTER — PATIENT MESSAGE (OUTPATIENT)
Dept: NEUROLOGY | Facility: HOSPITAL | Age: 64
End: 2019-07-08

## 2019-07-08 ENCOUNTER — ANESTHESIA (OUTPATIENT)
Dept: ENDOSCOPY | Facility: HOSPITAL | Age: 64
DRG: 981 | End: 2019-07-08
Payer: COMMERCIAL

## 2019-07-08 LAB
ALBUMIN SERPL BCP-MCNC: 3.3 G/DL (ref 3.5–5.2)
ALP SERPL-CCNC: 81 U/L (ref 55–135)
ALT SERPL W/O P-5'-P-CCNC: 21 U/L (ref 10–44)
ANION GAP SERPL CALC-SCNC: 6 MMOL/L (ref 8–16)
AST SERPL-CCNC: 25 U/L (ref 10–40)
BASOPHILS # BLD AUTO: 0.02 K/UL (ref 0–0.2)
BASOPHILS # BLD AUTO: 0.03 K/UL (ref 0–0.2)
BASOPHILS NFR BLD: 0.5 % (ref 0–1.9)
BASOPHILS NFR BLD: 0.6 % (ref 0–1.9)
BILIRUB SERPL-MCNC: 0.4 MG/DL (ref 0.1–1)
BUN SERPL-MCNC: 44 MG/DL (ref 8–23)
CALCIUM SERPL-MCNC: 8.2 MG/DL (ref 8.7–10.5)
CHLORIDE SERPL-SCNC: 106 MMOL/L (ref 95–110)
CO2 SERPL-SCNC: 24 MMOL/L (ref 23–29)
CREAT SERPL-MCNC: 4.2 MG/DL (ref 0.5–1.4)
DIFFERENTIAL METHOD: ABNORMAL
DIFFERENTIAL METHOD: ABNORMAL
EOSINOPHIL # BLD AUTO: 0 K/UL (ref 0–0.5)
EOSINOPHIL # BLD AUTO: 0.1 K/UL (ref 0–0.5)
EOSINOPHIL NFR BLD: 1 % (ref 0–8)
EOSINOPHIL NFR BLD: 1.5 % (ref 0–8)
ERYTHROCYTE [DISTWIDTH] IN BLOOD BY AUTOMATED COUNT: 17.1 % (ref 11.5–14.5)
ERYTHROCYTE [DISTWIDTH] IN BLOOD BY AUTOMATED COUNT: 17.7 % (ref 11.5–14.5)
EST. GFR  (AFRICAN AMERICAN): 16 ML/MIN/1.73 M^2
EST. GFR  (NON AFRICAN AMERICAN): 14 ML/MIN/1.73 M^2
GLUCOSE SERPL-MCNC: 127 MG/DL (ref 70–110)
HCT VFR BLD AUTO: 22.1 % (ref 40–54)
HCT VFR BLD AUTO: 22.7 % (ref 40–54)
HGB BLD-MCNC: 6.8 G/DL (ref 14–18)
HGB BLD-MCNC: 7.1 G/DL (ref 14–18)
INR PPP: 1.1 (ref 0.8–1.2)
LYMPHOCYTES # BLD AUTO: 0.6 K/UL (ref 1–4.8)
LYMPHOCYTES # BLD AUTO: 1 K/UL (ref 1–4.8)
LYMPHOCYTES NFR BLD: 15.7 % (ref 18–48)
LYMPHOCYTES NFR BLD: 17.8 % (ref 18–48)
MAGNESIUM SERPL-MCNC: 1.9 MG/DL (ref 1.6–2.6)
MCH RBC QN AUTO: 28.8 PG (ref 27–31)
MCH RBC QN AUTO: 29.1 PG (ref 27–31)
MCHC RBC AUTO-ENTMCNC: 30.8 G/DL (ref 32–36)
MCHC RBC AUTO-ENTMCNC: 31.3 G/DL (ref 32–36)
MCV RBC AUTO: 93 FL (ref 82–98)
MCV RBC AUTO: 94 FL (ref 82–98)
MONOCYTES # BLD AUTO: 0.2 K/UL (ref 0.3–1)
MONOCYTES # BLD AUTO: 0.4 K/UL (ref 0.3–1)
MONOCYTES NFR BLD: 6.3 % (ref 4–15)
MONOCYTES NFR BLD: 6.5 % (ref 4–15)
NEUTROPHILS # BLD AUTO: 2.9 K/UL (ref 1.8–7.7)
NEUTROPHILS # BLD AUTO: 3.9 K/UL (ref 1.8–7.7)
NEUTROPHILS NFR BLD: 73.6 % (ref 38–73)
NEUTROPHILS NFR BLD: 76.5 % (ref 38–73)
PHOSPHATE SERPL-MCNC: 4.1 MG/DL (ref 2.7–4.5)
PLATELET # BLD AUTO: 164 K/UL (ref 150–350)
PLATELET # BLD AUTO: 181 K/UL (ref 150–350)
PMV BLD AUTO: 8.6 FL (ref 9.2–12.9)
PMV BLD AUTO: 8.8 FL (ref 9.2–12.9)
POCT GLUCOSE: 132 MG/DL (ref 70–110)
POCT GLUCOSE: 138 MG/DL (ref 70–110)
POCT GLUCOSE: 232 MG/DL (ref 70–110)
POCT GLUCOSE: 261 MG/DL (ref 70–110)
POCT GLUCOSE: 299 MG/DL (ref 70–110)
POTASSIUM SERPL-SCNC: 4.1 MMOL/L (ref 3.5–5.1)
PROT SERPL-MCNC: 4.6 G/DL (ref 6–8.4)
PROTHROMBIN TIME: 11.5 SEC (ref 9–12.5)
RBC # BLD AUTO: 2.36 M/UL (ref 4.6–6.2)
RBC # BLD AUTO: 2.44 M/UL (ref 4.6–6.2)
SODIUM SERPL-SCNC: 136 MMOL/L (ref 136–145)
WBC # BLD AUTO: 3.83 K/UL (ref 3.9–12.7)
WBC # BLD AUTO: 5.39 K/UL (ref 3.9–12.7)

## 2019-07-08 PROCEDURE — 85610 PROTHROMBIN TIME: CPT

## 2019-07-08 PROCEDURE — 63600175 PHARM REV CODE 636 W HCPCS: Performed by: STUDENT IN AN ORGANIZED HEALTH CARE EDUCATION/TRAINING PROGRAM

## 2019-07-08 PROCEDURE — 37000009 HC ANESTHESIA EA ADD 15 MINS: Performed by: INTERNAL MEDICINE

## 2019-07-08 PROCEDURE — 27000221 HC OXYGEN, UP TO 24 HOURS

## 2019-07-08 PROCEDURE — 88305 TISSUE SPECIMEN TO PATHOLOGY - SURGERY: ICD-10-PCS | Mod: 26,,, | Performed by: PATHOLOGY

## 2019-07-08 PROCEDURE — S0077 INJECTION, CLINDAMYCIN PHOSP: HCPCS

## 2019-07-08 PROCEDURE — 25000003 PHARM REV CODE 250: Performed by: STUDENT IN AN ORGANIZED HEALTH CARE EDUCATION/TRAINING PROGRAM

## 2019-07-08 PROCEDURE — 63600175 PHARM REV CODE 636 W HCPCS: Performed by: NURSE ANESTHETIST, CERTIFIED REGISTERED

## 2019-07-08 PROCEDURE — 84100 ASSAY OF PHOSPHORUS: CPT

## 2019-07-08 PROCEDURE — 97110 THERAPEUTIC EXERCISES: CPT

## 2019-07-08 PROCEDURE — S5571 INSULIN DISPOS PEN 3 ML: HCPCS | Performed by: STUDENT IN AN ORGANIZED HEALTH CARE EDUCATION/TRAINING PROGRAM

## 2019-07-08 PROCEDURE — 94761 N-INVAS EAR/PLS OXIMETRY MLT: CPT

## 2019-07-08 PROCEDURE — 25000003 PHARM REV CODE 250: Performed by: SURGERY

## 2019-07-08 PROCEDURE — 63600175 PHARM REV CODE 636 W HCPCS: Mod: JG | Performed by: SURGERY

## 2019-07-08 PROCEDURE — P9047 ALBUMIN (HUMAN), 25%, 50ML: HCPCS | Mod: JG | Performed by: SURGERY

## 2019-07-08 PROCEDURE — 25000003 PHARM REV CODE 250

## 2019-07-08 PROCEDURE — S0077 INJECTION, CLINDAMYCIN PHOSP: HCPCS | Performed by: STUDENT IN AN ORGANIZED HEALTH CARE EDUCATION/TRAINING PROGRAM

## 2019-07-08 PROCEDURE — 85025 COMPLETE CBC W/AUTO DIFF WBC: CPT

## 2019-07-08 PROCEDURE — 11000001 HC ACUTE MED/SURG PRIVATE ROOM

## 2019-07-08 PROCEDURE — 88305 TISSUE EXAM BY PATHOLOGIST: CPT | Mod: 26,,, | Performed by: PATHOLOGY

## 2019-07-08 PROCEDURE — 80053 COMPREHEN METABOLIC PANEL: CPT

## 2019-07-08 PROCEDURE — 97803 MED NUTRITION INDIV SUBSEQ: CPT

## 2019-07-08 PROCEDURE — 88305 TISSUE EXAM BY PATHOLOGIST: CPT | Performed by: PATHOLOGY

## 2019-07-08 PROCEDURE — 37000008 HC ANESTHESIA 1ST 15 MINUTES: Performed by: INTERNAL MEDICINE

## 2019-07-08 PROCEDURE — 83735 ASSAY OF MAGNESIUM: CPT

## 2019-07-08 PROCEDURE — 43239 EGD BIOPSY SINGLE/MULTIPLE: CPT | Performed by: INTERNAL MEDICINE

## 2019-07-08 PROCEDURE — B4185 PARENTERAL SOL 10 GM LIPIDS: HCPCS | Performed by: STUDENT IN AN ORGANIZED HEALTH CARE EDUCATION/TRAINING PROGRAM

## 2019-07-08 PROCEDURE — 27201012 HC FORCEPS, HOT/COLD, DISP: Performed by: INTERNAL MEDICINE

## 2019-07-08 PROCEDURE — 25000003 PHARM REV CODE 250: Performed by: NURSE ANESTHETIST, CERTIFIED REGISTERED

## 2019-07-08 RX ORDER — SODIUM CHLORIDE 450 MG/100ML
INJECTION, SOLUTION INTRAVENOUS CONTINUOUS
Status: DISCONTINUED | OUTPATIENT
Start: 2019-07-08 | End: 2019-07-11

## 2019-07-08 RX ORDER — PROPOFOL 10 MG/ML
VIAL (ML) INTRAVENOUS
Status: DISCONTINUED | OUTPATIENT
Start: 2019-07-08 | End: 2019-07-08

## 2019-07-08 RX ORDER — PROPOFOL 10 MG/ML
VIAL (ML) INTRAVENOUS CONTINUOUS PRN
Status: DISCONTINUED | OUTPATIENT
Start: 2019-07-08 | End: 2019-07-08

## 2019-07-08 RX ORDER — CLINDAMYCIN PHOSPHATE 600 MG/50ML
600 INJECTION, SOLUTION INTRAVENOUS EVERY 8 HOURS
Status: COMPLETED | OUTPATIENT
Start: 2019-07-08 | End: 2019-07-10

## 2019-07-08 RX ORDER — SODIUM CHLORIDE 9 MG/ML
INJECTION, SOLUTION INTRAVENOUS CONTINUOUS PRN
Status: DISCONTINUED | OUTPATIENT
Start: 2019-07-08 | End: 2019-07-08

## 2019-07-08 RX ADMIN — IRON SUCROSE 100 MG: 20 INJECTION, SOLUTION INTRAVENOUS at 08:07

## 2019-07-08 RX ADMIN — I.V. FAT EMULSION 250 ML: 20 EMULSION INTRAVENOUS at 09:07

## 2019-07-08 RX ADMIN — CLINDAMYCIN IN 5 PERCENT DEXTROSE 600 MG: 12 INJECTION, SOLUTION INTRAVENOUS at 09:07

## 2019-07-08 RX ADMIN — PANCRELIPASE 2 CAPSULE: 30000; 6000; 19000 CAPSULE, DELAYED RELEASE PELLETS ORAL at 08:07

## 2019-07-08 RX ADMIN — INSULIN ASPART 4 UNITS: 100 INJECTION, SOLUTION INTRAVENOUS; SUBCUTANEOUS at 04:07

## 2019-07-08 RX ADMIN — SODIUM CHLORIDE: 0.45 INJECTION, SOLUTION INTRAVENOUS at 07:07

## 2019-07-08 RX ADMIN — METOPROLOL SUCCINATE 25 MG: 25 TABLET, FILM COATED, EXTENDED RELEASE ORAL at 08:07

## 2019-07-08 RX ADMIN — INSULIN ASPART 3 UNITS: 100 INJECTION, SOLUTION INTRAVENOUS; SUBCUTANEOUS at 05:07

## 2019-07-08 RX ADMIN — HEPARIN SODIUM 5000 UNITS: 5000 INJECTION, SOLUTION INTRAVENOUS; SUBCUTANEOUS at 09:07

## 2019-07-08 RX ADMIN — INSULIN ASPART 4 UNITS: 100 INJECTION, SOLUTION INTRAVENOUS; SUBCUTANEOUS at 11:07

## 2019-07-08 RX ADMIN — EPOETIN ALFA-EPBX 10000 UNITS: 10000 INJECTION, SOLUTION INTRAVENOUS; SUBCUTANEOUS at 11:07

## 2019-07-08 RX ADMIN — PANCRELIPASE 1 CAPSULE: 24000; 76000; 120000 CAPSULE, DELAYED RELEASE PELLETS ORAL at 11:07

## 2019-07-08 RX ADMIN — PANCRELIPASE 1 CAPSULE: 24000; 76000; 120000 CAPSULE, DELAYED RELEASE PELLETS ORAL at 04:07

## 2019-07-08 RX ADMIN — FAMOTIDINE 20 MG: 20 TABLET, FILM COATED ORAL at 08:07

## 2019-07-08 RX ADMIN — HEPARIN SODIUM 5000 UNITS: 5000 INJECTION, SOLUTION INTRAVENOUS; SUBCUTANEOUS at 05:07

## 2019-07-08 RX ADMIN — PANCRELIPASE 2 CAPSULE: 30000; 6000; 19000 CAPSULE, DELAYED RELEASE PELLETS ORAL at 11:07

## 2019-07-08 RX ADMIN — PROPOFOL 75 MCG/KG/MIN: 10 INJECTION, EMULSION INTRAVENOUS at 10:07

## 2019-07-08 RX ADMIN — HEPARIN SODIUM 5000 UNITS: 5000 INJECTION, SOLUTION INTRAVENOUS; SUBCUTANEOUS at 02:07

## 2019-07-08 RX ADMIN — MIDODRINE HYDROCHLORIDE 2.5 MG: 2.5 TABLET ORAL at 11:07

## 2019-07-08 RX ADMIN — ISOSORBIDE MONONITRATE 30 MG: 30 TABLET, EXTENDED RELEASE ORAL at 08:07

## 2019-07-08 RX ADMIN — ATORVASTATIN CALCIUM 40 MG: 40 TABLET, FILM COATED ORAL at 08:07

## 2019-07-08 RX ADMIN — RAMELTEON 8 MG: 8 TABLET, FILM COATED ORAL at 12:07

## 2019-07-08 RX ADMIN — INSULIN DETEMIR 14 UNITS: 100 INJECTION, SOLUTION SUBCUTANEOUS at 09:07

## 2019-07-08 RX ADMIN — SUCRALFATE 1 G: 1 SUSPENSION ORAL at 11:07

## 2019-07-08 RX ADMIN — PANCRELIPASE 2 CAPSULE: 30000; 6000; 19000 CAPSULE, DELAYED RELEASE PELLETS ORAL at 04:07

## 2019-07-08 RX ADMIN — METOCLOPRAMIDE 10 MG: 10 TABLET ORAL at 04:07

## 2019-07-08 RX ADMIN — SUCRALFATE 1 G: 1 SUSPENSION ORAL at 05:07

## 2019-07-08 RX ADMIN — PROPOFOL 50 MG: 10 INJECTION, EMULSION INTRAVENOUS at 10:07

## 2019-07-08 RX ADMIN — PANCRELIPASE 1 CAPSULE: 24000; 76000; 120000 CAPSULE, DELAYED RELEASE PELLETS ORAL at 08:07

## 2019-07-08 RX ADMIN — ASCORBIC ACID, VITAMIN A PALMITATE, CHOLECALCIFEROL, THIAMINE HYDROCHLORIDE, RIBOFLAVIN-5 PHOSPHATE SODIUM, PYRIDOXINE HYDROCHLORIDE, NIACINAMIDE, DEXPANTHENOL, ALPHA-TOCOPHEROL ACETATE, VITAMIN K1, FOLIC ACID, BIOTIN, CYANOCOBALAMIN: 200; 3300; 200; 6; 3.6; 6; 40; 15; 10; 150; 600; 60; 5 INJECTION, SOLUTION INTRAVENOUS at 09:07

## 2019-07-08 RX ADMIN — CLINDAMYCIN IN 5 PERCENT DEXTROSE 600 MG: 12 INJECTION, SOLUTION INTRAVENOUS at 02:07

## 2019-07-08 RX ADMIN — METOCLOPRAMIDE 10 MG: 10 TABLET ORAL at 05:07

## 2019-07-08 RX ADMIN — SODIUM CHLORIDE: 0.45 INJECTION, SOLUTION INTRAVENOUS at 11:07

## 2019-07-08 RX ADMIN — MIDODRINE HYDROCHLORIDE 2.5 MG: 2.5 TABLET ORAL at 04:07

## 2019-07-08 RX ADMIN — ALBUMIN HUMAN 12.5 G: 0.25 SOLUTION INTRAVENOUS at 06:07

## 2019-07-08 RX ADMIN — CLINDAMYCIN IN 5 PERCENT DEXTROSE 600 MG: 12 INJECTION, SOLUTION INTRAVENOUS at 05:07

## 2019-07-08 RX ADMIN — SODIUM CHLORIDE: 9 INJECTION, SOLUTION INTRAVENOUS at 10:07

## 2019-07-08 RX ADMIN — METOCLOPRAMIDE 10 MG: 10 TABLET ORAL at 11:07

## 2019-07-08 RX ADMIN — INSULIN ASPART 1 UNITS: 100 INJECTION, SOLUTION INTRAVENOUS; SUBCUTANEOUS at 12:07

## 2019-07-08 RX ADMIN — CYANOCOBALAMIN 1000 MCG: 1000 INJECTION, SOLUTION INTRAMUSCULAR at 08:07

## 2019-07-08 RX ADMIN — MIDODRINE HYDROCHLORIDE 2.5 MG: 2.5 TABLET ORAL at 08:07

## 2019-07-08 RX ADMIN — ERYTHROPOIETIN 1600 UNITS: 2000 INJECTION, SOLUTION INTRAVENOUS; SUBCUTANEOUS at 11:07

## 2019-07-08 RX ADMIN — TAMSULOSIN HYDROCHLORIDE 0.4 MG: 0.4 CAPSULE ORAL at 08:07

## 2019-07-08 RX ADMIN — SUCRALFATE 1 G: 1 SUSPENSION ORAL at 12:07

## 2019-07-08 RX ADMIN — ALBUMIN HUMAN 12.5 G: 0.25 SOLUTION INTRAVENOUS at 05:07

## 2019-07-08 NOTE — PLAN OF CARE
Tn visited with pt and updated on plan of care; pt had egd today; d/c plan remains snf when medically stable.  Tn gave pt business card and encouraged to call for any needs/concerns.     07/08/19 1415   Discharge Reassessment   Assessment Type Discharge Planning Reassessment   Provided patient/caregiver education on the expected discharge date and the discharge plan Yes   Do you have any problems affording any of your prescribed medications? No   Discharge Plan A Skilled Nursing Facility   Discharge Plan B Home Health   DME Needed Upon Discharge    (tbd)   Patient choice form signed by patient/caregiver N/A   Anticipated Discharge Disposition SNF   Can the patient answer the patient profile reliably? Yes, cognitively intact   How does the patient rate their overall health at the present time? Good   Describe the patient's ability to walk at the present time. Major restrictions/daily assistance from another person   How often would a person be available to care for the patient? Occasionally   Number of comorbid conditions (as recorded on the chart) Five or more   During the past month, has the patient often been bothered by feeling down, depressed or hopeless? No   During the past month, has the patient often been bothered by little interest or pleasure in doing things? No

## 2019-07-08 NOTE — PROGRESS NOTES
NEUROENDOCRINE SURGERY PROGRESS NOTE    63yoM with multiple comorbidities with extrahepatic PV occlusion plus mild cirrhosis with CRI and mild CHF contributing to recalcitrant ascites plus non-compliance with sodium/fluids as well as fatigue due to hypokalemia and fluid overload, now s/p paracentesis, transhepatic portal venogram, pressure measurements, and venoplasty with IR 6/28/19. Complicated by ELIEZER.     INTERVAL HISTORY  Hypotensive to 79/50 yesterday afternoon   NPO at midnight for EGD   Ambulating with PT  + BM  George in place  No f/c, no episodes of chest pain     Temp:  [98 °F (36.7 °C)-98.7 °F (37.1 °C)] 98.4 °F (36.9 °C)  Pulse:  [] 103  Resp:  [11-25] 25  SpO2:  [96 %-100 %] 97 %  BP: ()/(49-76) 130/62    I/O last 3 completed shifts:  In: 4101.3 [P.O.:195; I.V.:1988.6; IV Piggyback:300]  Out: 3230 [Urine:3230]    EXAM  GEN: A&Ox3, NAD  CV: Mild tachy to 100   RESP: Non-labored breathing  ABD: Obese, soft, NT/ND.   EXT: BLE with improved edema, minimal pitting  : George in place with clear yellow urine, output 1660 over 24 hrs     LABS  All labs reviewed  WBC 5  H/H 7.1/22.7 from 8.2/25    Lytes ok  BUN/Cr 44/ 4.2 (downtrending)    -232    IMAGING  No new imaging    ASSESSMENT/PLAN  63yoM with multiple comorbidities with extrahepatic PV occlusion plus mild cirrhosis with CRI and mild CHF contributing to recalcitrant ascites plus non-compliance with sodium/fluids as well as fatigue due to hypokalemia and fluid overload, now s/p paracentesis, transhepatic portal venogram, pressure measurements, and venoplasty with IR 6/28/19. Complicated by ELIEZER     NEURO: continue pain control prn  RESP: stable, continue IS, duonebs prn  CV: Episodic atypical chest pain and PVCs on monitor - cardiology consulted, no additional recommendations, signed off. Pain does not seem cardiac in origin. Continue to monitor. EKG without concerning changes per cards, troponins have been negative.  Also  continues to be intermittently hypotensive, continue to monitor.  RENAL: UOP stabilizing, 1660 over 24 hours. Replacing q2h with 1/2NS. Cr continues to downtrend.   FEN/GI: Low potassium diabetic diet, on Clinimix at 50 ml/hr. EGD today per GI.   HEME: s/p 1u PRBC,H/H responded appropriately to 1 unit PRBC however downtrending again. GI will evaluate for varices on EGD today. Continue to monitor with daily CBC.   ID: No fever or leukocytosis. IV clindamycin day 12, will discuss duration on rounds.  ENDOCRINE: CBG improving, continue pm levemir at 14 u (home dose) and 4u novolog with meals.   MSK: Ambulate with PT/OT, elevate legs, wound care to open wounds   PPX: SQH, Pepcid  DISPO: Continue ICU care. Possible transfer to floor today.    Reyna Barajas MD  [employer] General Surgery PGY-2

## 2019-07-08 NOTE — PLAN OF CARE
Problem: Physical Therapy Goal  Goal: Physical Therapy Goal  Goals to be met by: 2019    Patient will increase functional independence with mobility by performin. Sit to supine with Mod I  2. Sit to stand transfer with Mod I  3. Bed to chair transfer with Supervision using Rolling Walker  3. Gait  x50 feet with Supervision using Rolling Walker.   4. Lower extremity exercise program x12 reps per handout, with supervision       Outcome: Ongoing (interventions implemented as appropriate)  Continue working toward goals.

## 2019-07-08 NOTE — PLAN OF CARE
Report received from [facility].   RADHAO since MN except 8am meds.    Patent IV access, PIV and R sc dual port.

## 2019-07-08 NOTE — PT/OT/SLP PROGRESS
Occupational Therapy   Treatment    Name: Jian Arrieta  MRN: [id number]  Admitting Diagnosis:  Portal hypertension  Day of Surgery    Recommendations:     Discharge Recommendations: nursing facility, skilled  Discharge Equipment Recommendations:  (TBD pending progress, possibly TTB)  Barriers to discharge:  Decreased caregiver support, Inaccessible home environment    Assessment:     Jian Arrieta is a 63 y.o. male with a medical diagnosis of Portal hypertension.  He presents with deconditioning, limited safety awareness and mild impulsivity noted this date with t/f to BSC. Pt declined sitting up in bed side chair or remaining EOB instead asking to sit on BSC for UE therex and reclining on BSC, c/o pain on back of legs after completing 1x10 reps UE therex seated on BSC . Performance deficits affecting function are weakness, impaired self care skills, impaired balance, decreased safety awareness, impaired endurance, impaired functional mobilty, impaired cardiopulmonary response to activity, gait instability, decreased lower extremity function, decreased upper extremity function, pain, impaired joint extensibility.     Rehab Prognosis:  Good; patient would benefit from acute skilled OT services to address these deficits and reach maximum level of function.       Plan:     Patient to be seen 5 x/week to address the above listed problems via self-care/home management, therapeutic activities, therapeutic exercises  · Plan of Care Expires: 08/04/19  · Plan of Care Reviewed with: patient    Subjective     Pain/Comfort:  · Pain Rating 1: (did not rate)  · Location - Orientation 1: generalized  · Location 1: penis(pt states 2/2 mathew catheter)  · Pain Addressed 1: Reposition, Distraction, Nurse notified    Objective:     Communicated with: nsg prior to session.  Patient found HOB elevated with mathew catheter, SCD, telemetry, bed alarm, blood pressure cuff, PICC line, pulse ox (continuous) upon OT entry to room.    General  Precautions: Standard, fall, hearing impaired   Orthopedic Precautions:N/A   Braces: N/A     Occupational Performance:     Bed Mobility:    · Patient completed Rolling/Turning to Right with stand by assistance  · Patient completed Scooting/Bridging with supervision  · Patient completed Supine to Sit with minimum assistance  · Patient completed Sit to Supine with moderate assistance and BLE management     Functional Mobility/Transfers:  · Patient completed Sit <> Stand Transfer with stand by assistance  with  no assistive device   · Patient completed Toilet Transfer Stand Pivot Transfer technique with supervision with  no AD but use of B hands on [facility] [place]   Functional Mobility: Pt with fair+ dynamic seated and standing balance.    Activities of Daily Living:  · Lower Body Dressing: total assistance to don B slippers supine in bed      [facility] 6 Click ADL: 17    Treatment & Education:  Pt educated on role of OT and POC.   Pt performing skills as listed above.  Pt completed 1x10 reps UE therex and required occasional v/c to maintain breath support during therex. Pt c/o pain R shoulder with forward punches but improved with verbal and physical cue to maintain scapular adduction as pt initially with B shoulders rounded, reported improved pain with scapular stabilization.    Patient left HOB elevated with all lines intact, call button in reach, bed alarm on, and nsg notified     GOALS:   Multidisciplinary Problems     Occupational Therapy Goals        Problem: Occupational Therapy Goal    Goal Priority Disciplines Outcome Interventions   Occupational Therapy Goal     OT, PT/OT Ongoing (interventions implemented as appropriate)    Description:  Goals to be met by: 7/29/2019    Patient will increase functional independence with ADLs by performing:    UE Dressing with Modified [place].  LE Dressing with Modified [place].  Toileting from toilet with Modified [place] for hygiene and clothing management.    Supine to sit with Modified [place].  Step transfer with Modified [place]  Toilet transfer to toilet with Modified [place].  Increased functional strength to WFL for ADLS.  Upper extremity exercise program 10 reps per handout, with supervision.                      Time Tracking:     OT Date of Treatment: 07/08/19  OT Start Time: 1438  OT Stop Time: 1510  OT Total Time (min): 32 min    Billable Minutes:Therapeutic Exercise 32    Cierra Escobar OT  7/8/2019

## 2019-07-08 NOTE — PLAN OF CARE
EGD revealing for multiple gastric erosions but no esophageal varices. Regular diet. See procedure report for detail

## 2019-07-08 NOTE — PLAN OF CARE
Problem: Adult Inpatient Plan of Care  Goal: Plan of Care Review  Outcome: Ongoing (interventions implemented as appropriate)  Pt on nc at 3 lpm bre well with no sign of distress noted. Will continue to monitor.

## 2019-07-08 NOTE — [employer] PATIENT INSTRUCTIONS
Discharge Summary/Instructions after an Endoscopic Procedure  Patient Name: Jian Arrieta  Patient MRN: [id number]  Patient YOB: 1955 Monday, July 08, 2019  Huan Brumfield MD  RESTRICTIONS:  During your procedure today, you received medications for sedation.  These   medications may affect your judgment, balance and coordination.  Therefore,   for 24 hours, you have the following restrictions:   - DO NOT drive a car, operate machinery, make legal/financial decisions,   sign important papers or drink alcohol.    ACTIVITY:  Today: no heavy lifting, straining or running due to procedural   sedation/anesthesia.  The following day: return to full activity including work.  DIET:  Eat and drink normally unless instructed otherwise.     TREATMENT FOR COMMON SIDE EFFECTS:  - Mild abdominal pain, nausea, belching, bloating or excessive gas:  rest,   eat lightly and use a heating pad.  - Sore Throat: treat with throat lozenges and/or gargle with warm salt   water.  - Because air was used during the procedure, expelling large amounts of air   from your rectum or belching is normal.  - If a bowel prep was taken, you may not have a bowel movement for 1-3 days.    This is normal.  SYMPTOMS TO WATCH FOR AND REPORT TO YOUR PHYSICIAN:  1. Abdominal pain or bloating, other than gas cramps.  2. Chest pain.  3. Back pain.  4. Signs of infection such as: chills or fever occurring within 24 hours   after the procedure.  5. Rectal bleeding, which would show as bright red, maroon, or black stools.   (A tablespoon of blood from the rectum is not serious, especially if   hemorrhoids are present.)  6. Vomiting.  7. Weakness or dizziness.  GO DIRECTLY TO THE NEAREST EMERGENCY ROOM IF YOU HAVE ANY OF THE FOLLOWING:      Difficulty breathing              Chills and/or fever over 101 F   Persistent vomiting and/or vomiting blood   Severe abdominal pain   Severe chest pain   Black, tarry stools   Bleeding- more than one tablespoon   Any  other symptom or condition that you feel may need urgent attention  Your doctor recommends these additional instructions:  If any biopsies were taken, your doctors clinic will contact you in 1 to 2   weeks with any results.  - Resume previous diet.   - Continue present medications.   - Return patient to hospital laboy for observation  Follow up biopsy results  Follow up with [facility]  For questions, problems or results please call your physician - Huan Brumfield MD at Work:  [phone].  EMERGENCY PHONE NUMBER: [phone],  LAB RESULTS: [phone]  IF A COMPLICATION OR EMERGENCY SITUATION ARISES AND YOU ARE UNABLE TO REACH   YOUR PHYSICIAN - GO DIRECTLY TO THE EMERGENCY ROOM.  MD Huan Regalado MD  7/8/2019 11:34:26 AM  This report has been verified and signed electronically.  [employer]

## 2019-07-08 NOTE — OR NURSING
Patient transferred to endo dept on portable monitor for EGD exam with Dr. Brumfield. Alert and talkative, no c./o pain or discomfort. Consents verified, VS recorded. Chart reviewed.

## 2019-07-08 NOTE — PROGRESS NOTES
"[facility]  Adult Nutrition  Progress Note    SUMMARY       Recommendations    Recommendation:   1. Change diet to  ADA low Na   2. Encourage intake at meals as tolerated.   3. If pt continues with good intake at meals, may wean TPN.    Goals:   Pt to meet > 85% of calorie/protein needs   Nutrition Goal Status: progressing towards goal  Communication of RD Recs: discussed on rounds    Reason for Assessment  Reason For Assessment: RD follow-up  Diagnosis: cardiac disease(Portal HTN)  Relevant Medical History: DM  Interdisciplinary Rounds: attended  General Information Comments: Pt was started on Regular diet post EGD today. Pt was previously on  ADA low Na low K 1000ml fluid restriction. Pt eating lucnh at visit. Reports fair-good intake at meals. PICC line. Receiving Clinimix 4.25/10 at 50ml/hr with daily IVFE. NFPE completed today -pt nourished  Nutrition Discharge Planning: Home on  calorie Renal diet     Nutrition Risk Screen  Nutrition Risk Screen: no indicators present    Nutrition/Diet History  Patient Reported Diet/Restrictions/Preferences: general  Food Preferences: no Episcopal or cultural food prefs identified  Spiritual, Cultural Beliefs, Holiness Practices, Values that Affect Care: no  Food Allergies: NKFA  Factors Affecting Nutritional Intake: altered gastrointestinal function, altered taste    Anthropometrics  Temp: 97.6 °F (36.4 °C)  Height Method: Stated  Height: 5' 7" (170.2 cm)  Height (inches): 67 in  Weight Method: Bed Scale  Weight: 120.1 kg (264 lb 12.4 oz)  Weight (lb): 264.78 lb  Ideal Body Weight (IBW), Male: 148 lb  % Ideal Body Weight, Male (lb): 175.32 lb  BMI (Calculated): 40.7  BMI Grade: greater than 40 - morbid obesity  Usual Body Weight (UBW), k kg(clinic visit on 2019)  Weight Change Amount: 20 lb 8 oz  % Usual Body Weight: 92.87  % Weight Change From Usual Weight: -7.32 %     Lab/Procedures/Meds  Pertinent Labs Reviewed: " reviewed  Pertinent Labs Comments: BUN 44H, Crea 4.2H, Glu 127H, Ca 8.2L, Alb 3.3L  Pertinent Medications Reviewed: reviewed  Pertinent Medications Comments: insulin, liapse-protease-amylase, Reglan, albumin    Estimated/Assessed Needs  Weight Used For Calorie Calculations: 120.1 kg (264 lb 12.4 oz)  Energy Calorie Requirements (kcal): 1949  Energy Need Method: Stites-St Jeor  Protein Requirements: 81g (1.2g/kg)  Weight Used For Protein Calculations: 67.2 kg (148 lb 2.4 oz)(IBW)  Estimated Fluid Requirement Method: RDA Method  RDA Method (mL): 1949  CHO Requirement: 220 gm    Nutrition Prescription Ordered  Current Diet Order: Regular    Evaluation of Received Nutrient/Fluid Intake  Parenteral Calories (kcal): 612  Parenteral Protein (gm): 51  Parenteral Fluid (mL): 1200  Lipid Calories (kcals): 500 kcals  GIR (Glucose Infusion Rate) (mg/kg/min): 0.7 mg/kg/min  Total Calories (kcal): 1112  % Kcal Needs: 57  % Protein Needs: 63  I/O: 3022/1660  Energy Calories Required: meeting needs  Protein Required: meeting needs  Fluid Required: meeting needs  Comments: LBM 7/7  % Intake of Estimated Energy Needs: 50 - 75 %  % Meal Intake: 50 - 75 %    Nutrition Risk  Level of Risk/Frequency of Follow-up: (2xweekly)     Assessment and Plan   Anasarca  Nutrition Problem  Unintended weight gain        Related to (etiology):   +Ascities with Paracenthesis  +Generalized body edema  Acute Kidney Injury        Signs and Symptoms (as evidenced by):   BMI > 40   DEC po intake   Supplemental Nutrition via TPN   Ed Lymphedema of Lower extrementies        Interventions:  Collaboration with other providers        Nutrition Diagnosis Status:   Continues     Monitor and Evaluation  Food and Nutrient Intake: energy intake  Food and Nutrient Adminstration: diet order  Anthropometric Measurements: weight, weight change, body mass index  Biochemical Data, Medical Tests and Procedures: electrolyte and renal panel, gastrointestinal profile,  glucose/endocrine profile, inflammatory profile, lipid profile  Nutrition-Focused Physical Findings: overall appearance, extremities, muscles and bones, head and eyes, skin     Malnutrition Assessment  Malnutrition Type: chronic illness  Edema (Fluid Accumulation): 3-->moderate([place] Lower Leg Lymphedema; Ascities; )   Subcutaneous Fat Loss (Final Summary): well nourished  Muscle Loss Evaluation (Final Summary): well nourished  Fluid Accumulation Evaluation: moderate       Nutrition Follow-Up  RD Follow-up?: Yes

## 2019-07-08 NOTE — PLAN OF CARE
Problem: Parenteral Nutrition  Goal: Effective Intravenous Nutrition Therapy Delivery  Outcome: Ongoing (interventions implemented as appropriate)  Recommendation:   1. Change diet to 2000 ADA low Na   2. Encourage intake at meals as tolerated.   3. If pt continues with good intake at meals, may wean TPN.     Goals:   Pt to meet > 85% of calorie/protein needs   Nutrition Goal Status: progressing towards goal  Communication of RD Recs: discussed on rounds

## 2019-07-08 NOTE — PLAN OF CARE
Problem: Adult Inpatient Plan of Care  Goal: Patient-Specific Goal (Individualization)  Outcome: Ongoing (interventions implemented as appropriate)  Pt had EGD today which was negative for bleeding. Tolerated regular diet.

## 2019-07-08 NOTE — TRANSFER OF CARE
"Anesthesia Transfer of Care Note    Patient: Jian Arrieta    Procedure(s) Performed: Procedure(s) (LRB):  ESOPHAGOGASTRODUODENOSCOPY (EGD) (N/A)    Patient location: ICU    Anesthesia Type: MAC    Transport from OR: Transported from OR on 2-3 L/min O2 by NC with adequate spontaneous ventilation. Continuous ECG monitoring in transport. Continuous SpO2 monitoring in transport    Post pain: adequate analgesia    Post assessment: no apparent anesthetic complications    Post vital signs: stable    Level of consciousness: awake and alert    Nausea/Vomiting: no nausea/vomiting    Complications: none    Transfer of care protocol was followed      Last vitals:   Visit Vitals  BP (!) 128/58   Pulse 101   Temp 36.5 °C (97.7 °F) (Temporal)   Resp 20   Ht 5' 7" (1.702 m)   Wt 120.1 kg (264 lb 12.4 oz)   SpO2 100%   BMI 41.47 kg/m²     "

## 2019-07-08 NOTE — PLAN OF CARE
Problem: Adult Inpatient Plan of Care  Goal: Plan of Care Review  Outcome: Ongoing (interventions implemented as appropriate)  Pt AAO. Denies pain. NPO for pending EGD. On TPN for nutritional support. Blood glucose monitored. Heels elevated off bed. Encourage to turn independently in bed to prevent pressure ulcer. Afebrile and on IV abx. George patent and draining clear, yellow urine. Bed in low position. Call light within reach. Remains free from fall/injury. Will continue to monitor.

## 2019-07-08 NOTE — PT/OT/SLP PROGRESS
"Physical Therapy Treatment    Patient Name:  Jian Arrieta   MRN:  [id number]    Recommendations:     Discharge Recommendations:  nursing facility, skilled   Discharge Equipment Recommendations: (TBD)   Barriers to discharge: Inaccessible home, Decreased caregiver support and decreased functional mobility    Assessment:     Jian Arrieta is a 63 y.o. male admitted with a medical diagnosis of Portal hypertension.  He presents with the following impairments/functional limitations:  weakness, impaired endurance, impaired functional mobilty, impaired self care skills, decreased safety awareness, decreased lower extremity function.  Nsg okayed BLE exercises in bed at this time secondary to pt still "loopy" from procedure earlier.  Pt was talkative during tx and participated in therex with vc's and assist for technique.  Pt would continue to benefit from P.T. To address impairments listed above.    Rehab Prognosis: Fair; patient would benefit from acute skilled PT services to address these deficits and reach maximum level of function.    Recent Surgery: Procedure(s) (LRB):  ESOPHAGOGASTRODUODENOSCOPY (EGD) (N/A) Day of Surgery    Plan:     During this hospitalization, patient to be seen 6 x/week to address the identified rehab impairments via gait training, therapeutic activities, therapeutic exercises, neuromuscular re-education and progress toward the following goals:    · Plan of Care Expires:  08/07/19    Subjective     Patient/Family Comments/goals: Pt agreed to tx.  Pain/Comfort:  · Pain Rating 1: 0/10  · Pain Rating Post-Intervention 1: 0/10      Objective:     Communicated with RN(Tripp) prior to session.  Patient found supine with mathew catheter, SCD, telemetry, blood pressure cuff, PICC line, pulse ox (continuous)(full ICU monitoring) upon PT entry to room.     General Precautions: Standard, fall, hearing impaired   Orthopedic Precautions:N/A   Braces:       Functional Mobility:  · Bed Mobility:     · Scooting: " contact guard assistance stand by assistance up to HOB using BUEs pulling up on headboard and using BLEs to push up to HOB with vc's for technique      AM-PAC 6 CLICK MOBILITY  Turning over in bed (including adjusting bedclothes, sheets and blankets)?: 4  Sitting down on and standing up from a chair with arms (e.g., wheelchair, bedside commode, etc.): 4  Moving from lying on back to sitting on the side of the bed?: 3  Moving to and from a bed to a chair (including a wheelchair)?: 3  Need to walk in hospital room?: 3  Climbing 3-5 steps with a railing?: 2  Basic Mobility Total Score: 19       Therapeutic Activities and Exercises:   Supine BLE therex: AP, heelslides, hip ABD/ADd, QS, glut sets, SAQs 15 x 2 with Jamee/CGA and vc's for proper technique     Patient left supine with all lines intact, call button in reach, bed alarm on and RN notified..    GOALS:   Multidisciplinary Problems     Physical Therapy Goals        Problem: Physical Therapy Goal    Goal Priority Disciplines Outcome Goal Variances Interventions   Physical Therapy Goal     PT, PT/OT Ongoing (interventions implemented as appropriate)     Description:  Goals to be met by: 2019    Patient will increase functional independence with mobility by performin. Sit to supine with Mod I  2. Sit to stand transfer with Mod I  3. Bed to chair transfer with Supervision using Rolling Walker  3. Gait  x50 feet with Supervision using Rolling Walker.   4. Lower extremity exercise program x12 reps per handout, with supervision                        Time Tracking:     PT Received On: 19  PT Start Time: 1144     PT Stop Time: 1200  PT Total Time (min): 16 min     Billable Minutes: Therapeutic Exercise 16    Treatment Type: Treatment  PT/PTA: PTA     PTA Visit Number: 1     Sandra Martinez PTA  2019

## 2019-07-09 LAB
ALBUMIN SERPL BCP-MCNC: 3.1 G/DL (ref 3.5–5.2)
ALP SERPL-CCNC: 102 U/L (ref 55–135)
ALT SERPL W/O P-5'-P-CCNC: 30 U/L (ref 10–44)
AMORPH CRY URNS QL MICRO: ABNORMAL
ANION GAP SERPL CALC-SCNC: 6 MMOL/L (ref 8–16)
AST SERPL-CCNC: 35 U/L (ref 10–40)
BACTERIA #/AREA URNS HPF: ABNORMAL /HPF
BASOPHILS # BLD AUTO: 0.03 K/UL (ref 0–0.2)
BASOPHILS NFR BLD: 0.5 % (ref 0–1.9)
BILIRUB SERPL-MCNC: 1.1 MG/DL (ref 0.1–1)
BILIRUB UR QL STRIP: NEGATIVE
BLD PROD TYP BPU: NORMAL
BLOOD UNIT EXPIRATION DATE: NORMAL
BLOOD UNIT TYPE CODE: 9500
BLOOD UNIT TYPE: NORMAL
BUN SERPL-MCNC: 40 MG/DL (ref 8–23)
CALCIUM SERPL-MCNC: 8 MG/DL (ref 8.7–10.5)
CHLORIDE SERPL-SCNC: 107 MMOL/L (ref 95–110)
CLARITY UR: CLEAR
CO2 SERPL-SCNC: 23 MMOL/L (ref 23–29)
CODING SYSTEM: NORMAL
COLOR UR: YELLOW
CREAT SERPL-MCNC: 3.8 MG/DL (ref 0.5–1.4)
DIFFERENTIAL METHOD: ABNORMAL
DISPENSE STATUS: NORMAL
EOSINOPHIL # BLD AUTO: 0.1 K/UL (ref 0–0.5)
EOSINOPHIL NFR BLD: 1.1 % (ref 0–8)
ERYTHROCYTE [DISTWIDTH] IN BLOOD BY AUTOMATED COUNT: 18 % (ref 11.5–14.5)
EST. GFR  (AFRICAN AMERICAN): 18 ML/MIN/1.73 M^2
EST. GFR  (NON AFRICAN AMERICAN): 16 ML/MIN/1.73 M^2
GLUCOSE SERPL-MCNC: 281 MG/DL (ref 70–110)
GLUCOSE UR QL STRIP: ABNORMAL
HCT VFR BLD AUTO: 25.5 % (ref 40–54)
HGB BLD-MCNC: 7.8 G/DL (ref 14–18)
HGB UR QL STRIP: ABNORMAL
HYALINE CASTS #/AREA URNS LPF: 0 /LPF
INR PPP: 1.2 (ref 0.8–1.2)
KETONES UR QL STRIP: NEGATIVE
LEUKOCYTE ESTERASE UR QL STRIP: NEGATIVE
LYMPHOCYTES # BLD AUTO: 0.9 K/UL (ref 1–4.8)
LYMPHOCYTES NFR BLD: 16.3 % (ref 18–48)
MAGNESIUM SERPL-MCNC: 2 MG/DL (ref 1.6–2.6)
MCH RBC QN AUTO: 29 PG (ref 27–31)
MCHC RBC AUTO-ENTMCNC: 30.6 G/DL (ref 32–36)
MCV RBC AUTO: 95 FL (ref 82–98)
MICROSCOPIC COMMENT: ABNORMAL
MONOCYTES # BLD AUTO: 0.2 K/UL (ref 0.3–1)
MONOCYTES NFR BLD: 4.2 % (ref 4–15)
NEUTROPHILS # BLD AUTO: 4.2 K/UL (ref 1.8–7.7)
NEUTROPHILS NFR BLD: 77.9 % (ref 38–73)
NITRITE UR QL STRIP: NEGATIVE
PH UR STRIP: 6 [PH] (ref 5–8)
PHOSPHATE SERPL-MCNC: 4 MG/DL (ref 2.7–4.5)
PLATELET # BLD AUTO: 173 K/UL (ref 150–350)
PMV BLD AUTO: 9 FL (ref 9.2–12.9)
POCT GLUCOSE: 231 MG/DL (ref 70–110)
POCT GLUCOSE: 237 MG/DL (ref 70–110)
POCT GLUCOSE: 272 MG/DL (ref 70–110)
POCT GLUCOSE: 276 MG/DL (ref 70–110)
POCT GLUCOSE: 319 MG/DL (ref 70–110)
POTASSIUM SERPL-SCNC: 3.9 MMOL/L (ref 3.5–5.1)
PROT SERPL-MCNC: 4.5 G/DL (ref 6–8.4)
PROT UR QL STRIP: ABNORMAL
PROTHROMBIN TIME: 12 SEC (ref 9–12.5)
RBC # BLD AUTO: 2.69 M/UL (ref 4.6–6.2)
RBC #/AREA URNS HPF: 1 /HPF (ref 0–4)
SODIUM SERPL-SCNC: 136 MMOL/L (ref 136–145)
SP GR UR STRIP: 1.02 (ref 1–1.03)
SQUAMOUS #/AREA URNS HPF: 2 /HPF
TRANS ERYTHROCYTES VOL PATIENT: NORMAL ML
URN SPEC COLLECT METH UR: ABNORMAL
UROBILINOGEN UR STRIP-ACNC: NEGATIVE EU/DL
WBC # BLD AUTO: 5.46 K/UL (ref 3.9–12.7)
WBC #/AREA URNS HPF: 2 /HPF (ref 0–5)

## 2019-07-09 PROCEDURE — 25000003 PHARM REV CODE 250: Performed by: STUDENT IN AN ORGANIZED HEALTH CARE EDUCATION/TRAINING PROGRAM

## 2019-07-09 PROCEDURE — 97116 GAIT TRAINING THERAPY: CPT

## 2019-07-09 PROCEDURE — 85610 PROTHROMBIN TIME: CPT

## 2019-07-09 PROCEDURE — 36415 COLL VENOUS BLD VENIPUNCTURE: CPT

## 2019-07-09 PROCEDURE — 63600175 PHARM REV CODE 636 W HCPCS: Mod: JG | Performed by: STUDENT IN AN ORGANIZED HEALTH CARE EDUCATION/TRAINING PROGRAM

## 2019-07-09 PROCEDURE — P9047 ALBUMIN (HUMAN), 25%, 50ML: HCPCS | Mod: JG | Performed by: STUDENT IN AN ORGANIZED HEALTH CARE EDUCATION/TRAINING PROGRAM

## 2019-07-09 PROCEDURE — 83735 ASSAY OF MAGNESIUM: CPT

## 2019-07-09 PROCEDURE — 63600175 PHARM REV CODE 636 W HCPCS: Performed by: SURGERY

## 2019-07-09 PROCEDURE — 25000003 PHARM REV CODE 250: Performed by: SURGERY

## 2019-07-09 PROCEDURE — 84100 ASSAY OF PHOSPHORUS: CPT

## 2019-07-09 PROCEDURE — 85025 COMPLETE CBC W/AUTO DIFF WBC: CPT

## 2019-07-09 PROCEDURE — 97530 THERAPEUTIC ACTIVITIES: CPT

## 2019-07-09 PROCEDURE — 87040 BLOOD CULTURE FOR BACTERIA: CPT

## 2019-07-09 PROCEDURE — P9021 RED BLOOD CELLS UNIT: HCPCS

## 2019-07-09 PROCEDURE — 63600175 PHARM REV CODE 636 W HCPCS: Performed by: STUDENT IN AN ORGANIZED HEALTH CARE EDUCATION/TRAINING PROGRAM

## 2019-07-09 PROCEDURE — 97110 THERAPEUTIC EXERCISES: CPT

## 2019-07-09 PROCEDURE — 80053 COMPREHEN METABOLIC PANEL: CPT

## 2019-07-09 PROCEDURE — S0077 INJECTION, CLINDAMYCIN PHOSP: HCPCS | Performed by: STUDENT IN AN ORGANIZED HEALTH CARE EDUCATION/TRAINING PROGRAM

## 2019-07-09 PROCEDURE — 11000001 HC ACUTE MED/SURG PRIVATE ROOM

## 2019-07-09 PROCEDURE — 81000 URINALYSIS NONAUTO W/SCOPE: CPT

## 2019-07-09 RX ORDER — DIPHENHYDRAMINE HYDROCHLORIDE 50 MG/ML
25 INJECTION INTRAMUSCULAR; INTRAVENOUS ONCE
Status: COMPLETED | OUTPATIENT
Start: 2019-07-09 | End: 2019-07-09

## 2019-07-09 RX ADMIN — METOCLOPRAMIDE 10 MG: 10 TABLET ORAL at 11:07

## 2019-07-09 RX ADMIN — MIDODRINE HYDROCHLORIDE 2.5 MG: 2.5 TABLET ORAL at 09:07

## 2019-07-09 RX ADMIN — INSULIN ASPART 4 UNITS: 100 INJECTION, SOLUTION INTRAVENOUS; SUBCUTANEOUS at 07:07

## 2019-07-09 RX ADMIN — ATORVASTATIN CALCIUM 40 MG: 40 TABLET, FILM COATED ORAL at 09:07

## 2019-07-09 RX ADMIN — PANCRELIPASE 2 CAPSULE: 30000; 6000; 19000 CAPSULE, DELAYED RELEASE PELLETS ORAL at 11:07

## 2019-07-09 RX ADMIN — INSULIN ASPART 4 UNITS: 100 INJECTION, SOLUTION INTRAVENOUS; SUBCUTANEOUS at 11:07

## 2019-07-09 RX ADMIN — CLINDAMYCIN IN 5 PERCENT DEXTROSE 600 MG: 12 INJECTION, SOLUTION INTRAVENOUS at 09:07

## 2019-07-09 RX ADMIN — ACETAMINOPHEN 650 MG: 325 TABLET ORAL at 12:07

## 2019-07-09 RX ADMIN — TAMSULOSIN HYDROCHLORIDE 0.4 MG: 0.4 CAPSULE ORAL at 09:07

## 2019-07-09 RX ADMIN — IRON SUCROSE 100 MG: 20 INJECTION, SOLUTION INTRAVENOUS at 06:07

## 2019-07-09 RX ADMIN — INSULIN DETEMIR 14 UNITS: 100 INJECTION, SOLUTION SUBCUTANEOUS at 09:07

## 2019-07-09 RX ADMIN — HEPARIN SODIUM 5000 UNITS: 5000 INJECTION, SOLUTION INTRAVENOUS; SUBCUTANEOUS at 09:07

## 2019-07-09 RX ADMIN — EPOETIN ALFA-EPBX 10000 UNITS: 10000 INJECTION, SOLUTION INTRAVENOUS; SUBCUTANEOUS at 02:07

## 2019-07-09 RX ADMIN — PANCRELIPASE 2 CAPSULE: 30000; 6000; 19000 CAPSULE, DELAYED RELEASE PELLETS ORAL at 04:07

## 2019-07-09 RX ADMIN — CYANOCOBALAMIN 1000 MCG: 1000 INJECTION, SOLUTION INTRAMUSCULAR at 09:07

## 2019-07-09 RX ADMIN — SUCRALFATE 1 G: 1 SUSPENSION ORAL at 05:07

## 2019-07-09 RX ADMIN — SODIUM CHLORIDE: 0.45 INJECTION, SOLUTION INTRAVENOUS at 09:07

## 2019-07-09 RX ADMIN — SUCRALFATE 1 G: 1 SUSPENSION ORAL at 11:07

## 2019-07-09 RX ADMIN — METOCLOPRAMIDE 10 MG: 10 TABLET ORAL at 04:07

## 2019-07-09 RX ADMIN — PANCRELIPASE 1 CAPSULE: 24000; 76000; 120000 CAPSULE, DELAYED RELEASE PELLETS ORAL at 09:07

## 2019-07-09 RX ADMIN — HEPARIN SODIUM 5000 UNITS: 5000 INJECTION, SOLUTION INTRAVENOUS; SUBCUTANEOUS at 05:07

## 2019-07-09 RX ADMIN — INSULIN ASPART 2 UNITS: 100 INJECTION, SOLUTION INTRAVENOUS; SUBCUTANEOUS at 09:07

## 2019-07-09 RX ADMIN — PANCRELIPASE 1 CAPSULE: 24000; 76000; 120000 CAPSULE, DELAYED RELEASE PELLETS ORAL at 11:07

## 2019-07-09 RX ADMIN — PANCRELIPASE 1 CAPSULE: 24000; 76000; 120000 CAPSULE, DELAYED RELEASE PELLETS ORAL at 04:07

## 2019-07-09 RX ADMIN — CLINDAMYCIN IN 5 PERCENT DEXTROSE 600 MG: 12 INJECTION, SOLUTION INTRAVENOUS at 05:07

## 2019-07-09 RX ADMIN — INSULIN ASPART 3 UNITS: 100 INJECTION, SOLUTION INTRAVENOUS; SUBCUTANEOUS at 04:07

## 2019-07-09 RX ADMIN — RAMELTEON 8 MG: 8 TABLET, FILM COATED ORAL at 09:07

## 2019-07-09 RX ADMIN — SUCRALFATE 1 G: 1 SUSPENSION ORAL at 06:07

## 2019-07-09 RX ADMIN — MIDODRINE HYDROCHLORIDE 2.5 MG: 2.5 TABLET ORAL at 04:07

## 2019-07-09 RX ADMIN — DIPHENHYDRAMINE HYDROCHLORIDE 25 MG: 50 INJECTION INTRAMUSCULAR; INTRAVENOUS at 01:07

## 2019-07-09 RX ADMIN — METOPROLOL SUCCINATE 25 MG: 25 TABLET, FILM COATED, EXTENDED RELEASE ORAL at 09:07

## 2019-07-09 RX ADMIN — PANCRELIPASE 2 CAPSULE: 30000; 6000; 19000 CAPSULE, DELAYED RELEASE PELLETS ORAL at 09:07

## 2019-07-09 RX ADMIN — FAMOTIDINE 20 MG: 20 TABLET, FILM COATED ORAL at 09:07

## 2019-07-09 RX ADMIN — ISOSORBIDE MONONITRATE 30 MG: 30 TABLET, EXTENDED RELEASE ORAL at 09:07

## 2019-07-09 RX ADMIN — METOCLOPRAMIDE 10 MG: 10 TABLET ORAL at 05:07

## 2019-07-09 RX ADMIN — CLINDAMYCIN IN 5 PERCENT DEXTROSE 600 MG: 12 INJECTION, SOLUTION INTRAVENOUS at 02:07

## 2019-07-09 RX ADMIN — INSULIN ASPART 4 UNITS: 100 INJECTION, SOLUTION INTRAVENOUS; SUBCUTANEOUS at 04:07

## 2019-07-09 RX ADMIN — ALBUMIN HUMAN 12.5 G: 0.25 SOLUTION INTRAVENOUS at 04:07

## 2019-07-09 RX ADMIN — MIDODRINE HYDROCHLORIDE 2.5 MG: 2.5 TABLET ORAL at 11:07

## 2019-07-09 RX ADMIN — ALBUMIN HUMAN 12.5 G: 0.25 SOLUTION INTRAVENOUS at 02:07

## 2019-07-09 RX ADMIN — HEPARIN SODIUM 5000 UNITS: 5000 INJECTION, SOLUTION INTRAVENOUS; SUBCUTANEOUS at 02:07

## 2019-07-09 RX ADMIN — ASCORBIC ACID, VITAMIN A PALMITATE, CHOLECALCIFEROL, THIAMINE HYDROCHLORIDE, RIBOFLAVIN-5 PHOSPHATE SODIUM, PYRIDOXINE HYDROCHLORIDE, NIACINAMIDE, DEXPANTHENOL, ALPHA-TOCOPHEROL ACETATE, VITAMIN K1, FOLIC ACID, BIOTIN, CYANOCOBALAMIN: 200; 3300; 200; 6; 3.6; 6; 40; 15; 10; 150; 600; 60; 5 INJECTION, SOLUTION INTRAVENOUS at 10:07

## 2019-07-09 NOTE — ANESTHESIA POSTPROCEDURE EVALUATION
Anesthesia Post Evaluation    Patient: Jian Arrieta    Procedure(s) Performed: Procedure(s) (LRB):  ESOPHAGOGASTRODUODENOSCOPY (EGD) (N/A)    Final Anesthesia Type: general  Patient location during evaluation: PACU  Patient participation: Yes- Able to Participate  Level of consciousness: awake and alert, oriented and awake  Post-procedure vital signs: reviewed and stable  Pain management: adequate  Airway patency: patent  PONV status at discharge: No PONV  Anesthetic complications: no      Cardiovascular status: blood pressure returned to baseline  Respiratory status: unassisted and room air  Hydration status: euvolemic  Follow-up not needed.          Vitals Value Taken Time   /60 7/9/2019  7:46 AM   Temp 36.6 °C (97.8 °F) 7/9/2019  7:46 AM   Pulse 89 7/9/2019  8:00 AM   Resp 22 7/9/2019  7:46 AM   SpO2 97 % 7/9/2019  4:54 AM         No case tracking events are documented in the log.      Pain/Jason Score: Pain Rating Prior to Med Admin: 0 (7/9/2019 12:29 AM)

## 2019-07-09 NOTE — PROGRESS NOTES
NEUROENDOCRINE SURGERY PROGRESS NOTE    63yoM with multiple comorbidities with extrahepatic PV occlusion plus mild cirrhosis with CRI and mild CHF contributing to recalcitrant ascites plus non-compliance with sodium/fluids as well as fatigue due to hypokalemia and fluid overload, now s/p paracentesis, transhepatic portal venogram, pressure measurements, and venoplasty with IR 6/28/19. Complicated by ELIEZER.     INTERVAL HISTORY  Febrile to 101.3 overnight, tachycardic to 115 at that time  Transfused 1uPRBC, second unit pending  Tolerating diet, no n/v  Denies cp/sob   EGD yesterday with no varices. Gastric erosions with no active bleeding, biopsies taken.  George in place  Last BM 3 days ago    Temp:  [97.3 °F (36.3 °C)-101.3 °F (38.5 °C)] 97.3 °F (36.3 °C)  Pulse:  [] 94  Resp:  [15-24] 16  SpO2:  [96 %-100 %] 97 %  BP: (103-140)/(51-72) 140/72    I/O last 3 completed shifts:  In: 3473 [P.O.:280; I.V.:1152.1; Blood:251.7; IV Piggyback:300]  Out: 3100 [Urine:3100]    EXAM  GEN: A&Ox3, NAD  CV: Mild tachy to 100   RESP: Non-labored breathing  ABD: Obese, soft, NT/ND.   EXT: BLE with edema, minimal pitting, still some remaining erythema with mepilex over open wounds, no purulence   : George in place with clear yellow urine, output 2350over 24 hrs     LABS  All labs reviewed  WBC 5.46  H/H 7.8/25.5 from 6.8/22.1 after 1uPRBC    Lytes ok  BUN/Cr 40/3.8 ( downtrending)     BG elevated     IMAGING  No new imaging    ASSESSMENT/PLAN  63yoM with multiple comorbidities with extrahepatic PV occlusion plus mild cirrhosis with CRI and mild CHF contributing to recalcitrant ascites plus non-compliance with sodium/fluids as well as fatigue due to hypokalemia and fluid overload, now s/p paracentesis, transhepatic portal venogram, pressure measurements, and venoplasty with IR 6/28/19. Complicated by ELIEZER     NEURO: continue pain control prn  RESP: stable, continue IS, duonebs prn  CV: Episodic atypical chest pain and PVCs  on monitor - cardiology consulted, no additional recommendations, signed off. Pain does not seem cardiac in origin. Continue to monitor. EKG without concerning changes per cards, troponins have been negative.  Also continues to be intermittently hypotensive, continue to monitor.  RENAL: UOP stabilizing, 1660 over 24 hours. Replacing q2h with 1/2NS. Cr continues to downtrend.   FEN/GI: Low potassium diabetic diet, on Clinimix at 50 ml/hr. EGD with no varices. Follow up biopsy.    HEME: s/p 1u PRBC this AM.  2nd unit pending.  Will monitor for stability. If continued bleeding will plan tagged RBC study to localize. No hematemesis or bloody BM.  ID: Febrile to 101.3 , no leukocytosis. IV clindamycin day 12, will  Plan to continue for 14 day course. Monitor fevers.   ENDOCRINE: CBG elevated, continue pm levemir at 14 u (home dose) and 4u novolog with meals.   MSK: Ambulate with PT/OT, elevate legs, wound care to open wounds   PPX: SQH, Pepcid  DISPO: Continue to monitor for ELIEZER improvement, H/h stability    Reyna Barajas MD  [employer] General Surgery PGY-2

## 2019-07-09 NOTE — PT/OT/SLP PROGRESS
Physical Therapy Treatment    Patient Name:  Jian Arrieta   MRN:  [id number]    Recommendations:     Discharge Recommendations:  nursing facility, skilled   Discharge Equipment Recommendations: (TBD)   Barriers to discharge: Inaccessible home, Decreased caregiver support and decreased strength and endurance    Assessment:     Jian Arrieta is a 63 y.o. male admitted with a medical diagnosis of Portal hypertension.  He presents with the following impairments/functional limitations:  weakness, impaired endurance, impaired functional mobilty, impaired self care skills, gait instability, impaired balance, decreased lower extremity function, decreased safety awareness, decreased ROM, impaired skin, edema. Pt ambulated 20t and 36ft with RW and Jamee/close CGA with assist for IV pole.  Pt demonstrated one bout of unsteadiness to the left that required Jamee to recover. VC's for increased safety awareness secondary to pt picking up RW off floor occasionally during tx.  Pt would continue to benefit from P.T. To address impairments listed above.    Rehab Prognosis: Fair; patient would benefit from acute skilled PT services to address these deficits and reach maximum level of function.    Recent Surgery: Procedure(s) (LRB):  ESOPHAGOGASTRODUODENOSCOPY (EGD) (N/A) 1 Day Post-Op    Plan:     During this hospitalization, patient to be seen 6 x/week to address the identified rehab impairments via gait training, therapeutic activities, therapeutic exercises, neuromuscular re-education and progress toward the following goals:    · Plan of Care Expires:  08/07/19    Subjective       Patient/Family Comments/goals: Pt agreed to tx.  Pain/Comfort:  · Pain Rating 1: 0/10  · Pain Rating Post-Intervention 1: 0/10      Objective:     Communicated with RN(Destiny) prior to session.  Patient found supine with PICC line, telemetry upon PT entry to room.     General Precautions: Standard, fall, hearing impaired   Orthopedic Precautions:N/A    Braces:       Functional Mobility:  · Bed Mobility:     · Rolling Right: modified independence and supervision  · Scooting: modified independence and supervision  · Supine to Sit: modified independence and supervision  · Transfers:     · Toilet: Jamee/close CGA when turning around to sit on b/s commode.  · Sit to Stand:  contact guard assistance with rolling walker and vc's for hand placement.  EOB x 2 reps and b/s commode  · Gait: 20ft and 36ft with RW and Jamee/close CGA with assist for IV pole. SEated rest break between bouts secondary to mild SOB and decreased strength and endurance.  see assessment  · Balance: sitting good, standing fair, gait fair-/fair      AM-PAC 6 CLICK MOBILITY  Turning over in bed (including adjusting bedclothes, sheets and blankets)?: 4  Sitting down on and standing up from a chair with arms (e.g., wheelchair, bedside commode, etc.): 3  Moving from lying on back to sitting on the side of the bed?: 3  Moving to and from a bed to a chair (including a wheelchair)?: 3  Need to walk in hospital room?: 3  Climbing 3-5 steps with a railing?: 2  Basic Mobility Total Score: 18       Therapeutic Activities and Exercises:   Seated BLE therex: AP, LAQ, hip flexion/ABD/ADD(with LE extended, Jamee)  VC's and occasional Jamee for increased ROM.  10 reps except hip ABD/ADD 5 x 2 reps.    Patient left up in chair with all lines intact, call button in reach, chair alarm on and RN notified..    GOALS:   Multidisciplinary Problems     Physical Therapy Goals        Problem: Physical Therapy Goal    Goal Priority Disciplines Outcome Goal Variances Interventions   Physical Therapy Goal     PT, PT/OT Ongoing (interventions implemented as appropriate)     Description:  Goals to be met by: 2019    Patient will increase functional independence with mobility by performin. Sit to supine with Mod I  2. Sit to stand transfer with Mod I  3. Bed to chair transfer with Supervision using Rolling Walker  3. Gait   x50 feet with Supervision using Rolling Walker.   4. Lower extremity exercise program x12 reps per handout, with supervision                        Time Tracking:     PT Received On: 07/09/19  PT Start Time: 1527     PT Stop Time: 1553  PT Total Time (min): 26 min     Billable Minutes: Gait Training 14 and Therapeutic Activity 12    Treatment Type: Treatment  PT/PTA: PTA     PTA Visit Number: 2     Sandra Martinez, RICHY  07/09/2019

## 2019-07-09 NOTE — PLAN OF CARE
Problem: Adult Inpatient Plan of Care  Goal: Plan of Care Review  Outcome: Ongoing (interventions implemented as appropriate)  Pt AAOx4, no family members at bedside throughout shift. Pt denies pain, n/v/d, and SOB. Regular diet tolerated well. IVF, albumin, and TPN infusing per MAR to PICC - dressing changed. 1/2 unit of blood infused (see earlier note regarding second unit of blood). George cathter intact and draining clear yellow urine. Multiple skin tears/wounds all with foam dressings CDI. Cardiac monitoring and blood glucose checks continued. Safety maintained, bed alarm on - will cont to monitor.

## 2019-07-09 NOTE — NURSING
Notified by bedside nurse, Dora, that upon checking vitals before administering 1st unit of prbc pt had a temp of 101.3 and that prn tylenol 650 mg was administered. Also notified by her that blood bank called and stated that they will not prepare 2nd unit until post H and H done after first unit transfused secondary to blood shortage. Dr Barajas notified of all above info. New orders received for benadryl 25 mg iv times one now prior to transfusion and that it is okay to proceed with transfusion. Md states that Dr Rowe wants pt to have 2 units of PRBC's and would like his hemoglobin to be at 8 because he is a cardiac patient and has had chest pain. Informed Dr Barajas that pt has daily cbc ordered. Okay to do daily cbc in between units per Dr Barajas. Bedside nurse, Dora, updated

## 2019-07-09 NOTE — PLAN OF CARE
Problem: Occupational Therapy Goal  Goal: Occupational Therapy Goal  Goals to be met by: 7/29/2019    Patient will increase functional independence with ADLs by performing:    UE Dressing with Modified [place].  LE Dressing with Modified [place].  Toileting from toilet with Modified [place] for hygiene and clothing management.   Supine to sit with Modified [place].  Step transfer with Modified [place]  Toilet transfer to toilet with Modified [place].  Increased functional strength to WFL for ADLS.  Upper extremity exercise program 10 reps per handout, with supervision.     Outcome: Ongoing (interventions implemented as appropriate)  Jian Arrieta is a 63 y.o. male with a medical diagnosis of Portal hypertension.   Performance deficits affecting function are weakness, impaired endurance, impaired self care skills, impaired functional mobilty, gait instability, impaired balance, decreased lower extremity function, decreased safety awareness, pain.  Pt found up in chair, agreeable to therapy.  Pt easily fatigues and requires vc's for safety with transfers and gait using RW.  Fair tolerance of therapy.  Continue OT services to address functional goals, progressing as able.    KEYANA Martínez/L

## 2019-07-09 NOTE — PT/OT/SLP PROGRESS
Occupational Therapy   Treatment    Name: Jian Arrieta  MRN: [id number]  Admitting Diagnosis:  Portal hypertension  1 Day Post-Op    Recommendations:     Discharge Recommendations: nursing facility, skilled  Discharge Equipment Recommendations:  other (see comments)(defer to SNF)  Barriers to discharge:  Decreased caregiver support, Inaccessible home environment    Assessment:     Jian Arrieta is a 63 y.o. male with a medical diagnosis of Portal hypertension.   Performance deficits affecting function are weakness, impaired endurance, impaired self care skills, impaired functional mobilty, gait instability, impaired balance, decreased lower extremity function, decreased safety awareness, pain.  Pt found up in chair, agreeable to therapy.  Pt easily fatigues and requires vc's for safety with transfers and gait using RW.  Fair tolerance of therapy.  Continue OT services to address functional goals, progressing as able.      Rehab Prognosis:  Good; patient would benefit from acute skilled OT services to address these deficits and reach maximum level of function.       Plan:     Patient to be seen 5 x/week to address the above listed problems via self-care/home management, therapeutic activities, therapeutic exercises  · Plan of Care Expires: 08/04/19  · Plan of Care Reviewed with: patient    Subjective     Pain/Comfort:  · Pain Rating 1: 3/10(only when feet touched)  · Location - Side 1: Bilateral  · Location - Orientation 1: generalized  · Location 1: foot  · Pain Addressed 1: Distraction  · Pain Rating Post-Intervention 1: 0/10    Objective:     Communicated with: RN prior to session.  Patient found up in chair with telemetry, PICC line upon OT entry to room.    General Precautions: Standard, fall, hearing impaired   Orthopedic Precautions:N/A   Braces: N/A     Occupational Performance:     Functional Mobility/Transfers:  · Patient completed Sit <> Stand Transfer with minimum assistance  with  rolling walker, vc's  for hand placement.  Pt plops into chair.   · Patient completed Bed <> Chair Transfer using Stand Pivot technique with minimum assistance with rolling walker  · Patient completed Toilet Transfer Stand Pivot technique with minimum assistance with  rolling walker and bedside commode  · Functional Mobility: Pt ambulated room distances with CGA using RW.  Pt impulsive and unsafe requiring vc's throughout for RW safety/mgmt.       [facility] 6 Click ADL: 16    Treatment & Education:  Pt performed BUE red theraband exercises for shld flex, hori abd/add, chest press, bicep curl and tricep extensions x 10-20 reps each.  Pt tolerated well with rest breaks.     Patient left up in chair with all lines intact, call button in reach and chair alarm onEducation:      GOALS:   Multidisciplinary Problems     Occupational Therapy Goals        Problem: Occupational Therapy Goal    Goal Priority Disciplines Outcome Interventions   Occupational Therapy Goal     OT, PT/OT Ongoing (interventions implemented as appropriate)    Description:  Goals to be met by: 7/29/2019    Patient will increase functional independence with ADLs by performing:    UE Dressing with Modified [place].  LE Dressing with Modified [place].  Toileting from toilet with Modified [place] for hygiene and clothing management.   Supine to sit with Modified [place].  Step transfer with Modified [place]  Toilet transfer to toilet with Modified [place].  Increased functional strength to WFL for ADLS.  Upper extremity exercise program 10 reps per handout, with supervision.                      Time Tracking:     OT Date of Treatment: 07/09/19  OT Start Time: 1314  OT Stop Time: 1340  OT Total Time (min): 26 min    Billable Minutes:Therapeutic Activity 12  Therapeutic Exercise 14    AFIA Martínez  7/9/2019

## 2019-07-09 NOTE — PROGRESS NOTES
Called down to blood bank and spoke with Alphonso. No one had called previously to notify floor if blood was ready. Alphonso stated that one unit was ready but the other unit would not be prepared per blood shortage protocol. Alphonso stated that after one unit was infused we needed to obtain a post transfusion H&H to determine if pt needed another unit. Notified Destiny JARQUIN who notified MD (see note).

## 2019-07-09 NOTE — PLAN OF CARE
Order clarification received by Dr Churchill, team would like 2nd units of blood transfused. VN placed call to blood bank, criteria reviewed, John will discuss case with pathologist due to blood shortage and update VN/ bedside RN.

## 2019-07-09 NOTE — PROGRESS NOTES
Clinimix/ TPN orders renewed for today, same formula and at the same rate.   Lipid emulsion to be given every Mon-Wed-Fri  at 22:00  (per P&T approved pharmacy protocol).

## 2019-07-09 NOTE — PHYSICIAN QUERY
"PT Name: Jian Arrieta  MR #: [id number]    Physician Query Form - Hematology Clarification      CDS: Veronica Paul RN     Contact information:  [email or web address]    Phone: [phone]    This form is a permanent document in the medical record.      Query Date: July 9, 2019    By submitting this query, we are merely seeking further clarification of documentation. Please utilize your independent clinical judgment when addressing the question(s) below.    The Medical record contains the following:   Indicators  Supporting Clinical Findings Location in Medical Record    X "Anemia" documented Anemia   GI PN 7/7/19    X H & H =  Hgb= 7.8 --> 7.2 -->7.0 --> 6.7   Hct= 24.6 --> 22.4--> 22.2 --> 21.6 Lab 7/3/19 -->7/6/19      BP =                     HR=      "GI bleeding" documented      Acute bleeding (Non GI site)      X Transfusion(s) HEME: H/H responded appropriately to 1 unit PRBC. Continue to monitor with daily CBC.  NE PN 7/7/19    Treatment:      X Other:  Will monitor for stability. If continued bleeding will plan tagged RBC study to localize. No hematemesis or bloody BM.    morbid obesity (BMI 41), stage 1 hepatic fibrosis (biopsy negative for cirrhosis), chronic pancreatitis c/b large volume ascites requiring twice weekly paracenteses in addition to pancreatic pseudocyst, non-compliance with low salt diet/fluid restriction, DM insulin dependent (HgA1c 7.1 on 5/23/19), BLE lymphedema, CHF, pAF on Eliquis and Metoprolol, CAD s/p PCI x5 on Plavix with subsequent CABG x3v (~2017 at ; LIMA-LAD [patent], SVG-OM [patent], SVG-RCA[occluded; L->R collaterals from LAD to PDA]), HTN, HLD, BERHANE on CPAP, R knee OA, Charcot foot, normocytic anemia, who presented to [facility] 6/27/19 as a transfer from [facility] with large volume ascites 2/2 portal vein occlusion requiring IR procedure for recanna      NE PN 7/9/19        H&P 6/27/19     Provider, please specify diagnosis or diagnoses associated with " above clinical findings.    [  ] Iron deficiency anemia   [ x ] Chronic blood loss anemia   [  ] Precipitous drop in Hematocrit     [x  ] Anemia of chronic disease (ckd Specify chronic disease)     ckd  [  ] Other (Specify):   [  ] Clinically Undetermined     [  ] Other Hematological Diagnosis (please specify):     [  ] Clinically Undetermined       Please document in your progress notes daily for the duration of treatment, until resolved, and include in your discharge summary.

## 2019-07-09 NOTE — PLAN OF CARE
Problem: Physical Therapy Goal  Goal: Physical Therapy Goal  Goals to be met by: 2019    Patient will increase functional independence with mobility by performin. Sit to supine with Mod I  2. Sit to stand transfer with Mod I  3. Bed to chair transfer with Supervision using Rolling Walker  3. Gait  x50 feet with Supervision using Rolling Walker.   4. Lower extremity exercise program x12 reps per handout, with supervision       Outcome: Ongoing (interventions implemented as appropriate)  Continue working toward goals

## 2019-07-09 NOTE — PLAN OF CARE
TN met with patient to discuss continued discharge planning. Pt sister and brother in room at bedside and also updated on pt progress. TN informed pt that referrals for SNF were sent and that TN/SW in contact with facilities. Pt and family members have no other questions or concerns at this time. Will continue to monitor needs.    Future Appointments   Date Time Provider Department Center   7/12/2019 10:00 AM SON Rowe MD [facility] TUMOR [facility]   8/2/2019 11:20 AM Leon Barajas DO [facility] IM [facility]   8/26/2019 11:00 AM Jaime Carney III, MD [facility] CARDIO [facility]        07/09/19 [id number]   Discharge Reassessment   Assessment Type Discharge Planning Reassessment   Provided patient/caregiver education on the expected discharge date and the discharge plan Yes   Do you have any problems affording any of your prescribed medications? No   Discharge Plan A Skilled Nursing Facility   Discharge Plan B Home;Home with family;Home Health   DME Needed Upon Discharge  none   Patient choice form signed by patient/caregiver N/A   Anticipated Discharge Disposition SNF   Can the patient answer the patient profile reliably? Yes, cognitively intact   How does the patient rate their overall health at the present time? Good   Describe the patient's ability to walk at the present time. Walks with the help of equipment   How often would a person be available to care for the patient? Whenever needed   Number of comorbid conditions (as recorded on the chart) Five or more

## 2019-07-09 NOTE — PROGRESS NOTES
"Notified pt that I would have to remove his mathew because he wasn't in ICU anymore. Pt said "No. I want it overnight." Told pt I could take it out at 0600 before I leave and pt stated "I want it taken out at 0800, I will speak with the doctors about it in the morning." Charge nurse notified.   "

## 2019-07-09 NOTE — PROGRESS NOTES
John from blood bank called and said hematologist said Mr Arrieta does not fit the requirements to get 2nd unit of blood and would be happy to speak to MD on case so Dr. Espitia number was given to John

## 2019-07-10 LAB
ALBUMIN SERPL BCP-MCNC: 3.2 G/DL (ref 3.5–5.2)
ALP SERPL-CCNC: 122 U/L (ref 55–135)
ALT SERPL W/O P-5'-P-CCNC: 46 U/L (ref 10–44)
ANION GAP SERPL CALC-SCNC: 5 MMOL/L (ref 8–16)
AST SERPL-CCNC: 47 U/L (ref 10–40)
BASOPHILS # BLD AUTO: 0.03 K/UL (ref 0–0.2)
BASOPHILS NFR BLD: 0.7 % (ref 0–1.9)
BILIRUB SERPL-MCNC: 0.5 MG/DL (ref 0.1–1)
BUN SERPL-MCNC: 39 MG/DL (ref 8–23)
CALCIUM SERPL-MCNC: 8.2 MG/DL (ref 8.7–10.5)
CHLORIDE SERPL-SCNC: 108 MMOL/L (ref 95–110)
CO2 SERPL-SCNC: 23 MMOL/L (ref 23–29)
CREAT SERPL-MCNC: 3.4 MG/DL (ref 0.5–1.4)
DIFFERENTIAL METHOD: ABNORMAL
EOSINOPHIL # BLD AUTO: 0.1 K/UL (ref 0–0.5)
EOSINOPHIL NFR BLD: 2.8 % (ref 0–8)
ERYTHROCYTE [DISTWIDTH] IN BLOOD BY AUTOMATED COUNT: 19 % (ref 11.5–14.5)
EST. GFR  (AFRICAN AMERICAN): 21 ML/MIN/1.73 M^2
EST. GFR  (NON AFRICAN AMERICAN): 18 ML/MIN/1.73 M^2
GLUCOSE SERPL-MCNC: 144 MG/DL (ref 70–110)
HCT VFR BLD AUTO: 26.9 % (ref 40–54)
HGB BLD-MCNC: 8.2 G/DL (ref 14–18)
INR PPP: 1.1 (ref 0.8–1.2)
LYMPHOCYTES # BLD AUTO: 1.3 K/UL (ref 1–4.8)
LYMPHOCYTES NFR BLD: 30.8 % (ref 18–48)
MAGNESIUM SERPL-MCNC: 2.1 MG/DL (ref 1.6–2.6)
MCH RBC QN AUTO: 29.2 PG (ref 27–31)
MCHC RBC AUTO-ENTMCNC: 30.5 G/DL (ref 32–36)
MCV RBC AUTO: 96 FL (ref 82–98)
MONOCYTES # BLD AUTO: 0.5 K/UL (ref 0.3–1)
MONOCYTES NFR BLD: 11.3 % (ref 4–15)
NEUTROPHILS # BLD AUTO: 2.3 K/UL (ref 1.8–7.7)
NEUTROPHILS NFR BLD: 54.4 % (ref 38–73)
PHOSPHATE SERPL-MCNC: 3.5 MG/DL (ref 2.7–4.5)
PLATELET # BLD AUTO: 179 K/UL (ref 150–350)
PMV BLD AUTO: 9.2 FL (ref 9.2–12.9)
POCT GLUCOSE: 121 MG/DL (ref 70–110)
POCT GLUCOSE: 124 MG/DL (ref 70–110)
POCT GLUCOSE: 165 MG/DL (ref 70–110)
POCT GLUCOSE: 265 MG/DL (ref 70–110)
POTASSIUM SERPL-SCNC: 4 MMOL/L (ref 3.5–5.1)
PROT SERPL-MCNC: 4.9 G/DL (ref 6–8.4)
PROTHROMBIN TIME: 11.2 SEC (ref 9–12.5)
RBC # BLD AUTO: 2.81 M/UL (ref 4.6–6.2)
SODIUM SERPL-SCNC: 136 MMOL/L (ref 136–145)
WBC # BLD AUTO: 4.35 K/UL (ref 3.9–12.7)

## 2019-07-10 PROCEDURE — 11000001 HC ACUTE MED/SURG PRIVATE ROOM

## 2019-07-10 PROCEDURE — 25000003 PHARM REV CODE 250: Performed by: STUDENT IN AN ORGANIZED HEALTH CARE EDUCATION/TRAINING PROGRAM

## 2019-07-10 PROCEDURE — 25000003 PHARM REV CODE 250: Performed by: SURGERY

## 2019-07-10 PROCEDURE — 84100 ASSAY OF PHOSPHORUS: CPT

## 2019-07-10 PROCEDURE — 63600175 PHARM REV CODE 636 W HCPCS: Performed by: STUDENT IN AN ORGANIZED HEALTH CARE EDUCATION/TRAINING PROGRAM

## 2019-07-10 PROCEDURE — 97530 THERAPEUTIC ACTIVITIES: CPT

## 2019-07-10 PROCEDURE — P9047 ALBUMIN (HUMAN), 25%, 50ML: HCPCS | Mod: JG | Performed by: STUDENT IN AN ORGANIZED HEALTH CARE EDUCATION/TRAINING PROGRAM

## 2019-07-10 PROCEDURE — 63600175 PHARM REV CODE 636 W HCPCS: Mod: JG | Performed by: STUDENT IN AN ORGANIZED HEALTH CARE EDUCATION/TRAINING PROGRAM

## 2019-07-10 PROCEDURE — 97535 SELF CARE MNGMENT TRAINING: CPT

## 2019-07-10 PROCEDURE — 83735 ASSAY OF MAGNESIUM: CPT

## 2019-07-10 PROCEDURE — 85610 PROTHROMBIN TIME: CPT

## 2019-07-10 PROCEDURE — 99900035 HC TECH TIME PER 15 MIN (STAT)

## 2019-07-10 PROCEDURE — 85025 COMPLETE CBC W/AUTO DIFF WBC: CPT

## 2019-07-10 PROCEDURE — 94761 N-INVAS EAR/PLS OXIMETRY MLT: CPT

## 2019-07-10 PROCEDURE — 97116 GAIT TRAINING THERAPY: CPT

## 2019-07-10 PROCEDURE — 80053 COMPREHEN METABOLIC PANEL: CPT

## 2019-07-10 PROCEDURE — 97110 THERAPEUTIC EXERCISES: CPT

## 2019-07-10 PROCEDURE — 63600175 PHARM REV CODE 636 W HCPCS: Performed by: SURGERY

## 2019-07-10 PROCEDURE — S0077 INJECTION, CLINDAMYCIN PHOSP: HCPCS | Performed by: STUDENT IN AN ORGANIZED HEALTH CARE EDUCATION/TRAINING PROGRAM

## 2019-07-10 RX ADMIN — METOCLOPRAMIDE 10 MG: 10 TABLET ORAL at 05:07

## 2019-07-10 RX ADMIN — CLINDAMYCIN IN 5 PERCENT DEXTROSE 600 MG: 12 INJECTION, SOLUTION INTRAVENOUS at 03:07

## 2019-07-10 RX ADMIN — PANCRELIPASE 1 CAPSULE: 24000; 76000; 120000 CAPSULE, DELAYED RELEASE PELLETS ORAL at 05:07

## 2019-07-10 RX ADMIN — TAMSULOSIN HYDROCHLORIDE 0.4 MG: 0.4 CAPSULE ORAL at 08:07

## 2019-07-10 RX ADMIN — EPOETIN ALFA-EPBX 10000 UNITS: 10000 INJECTION, SOLUTION INTRAVENOUS; SUBCUTANEOUS at 08:07

## 2019-07-10 RX ADMIN — METOCLOPRAMIDE 10 MG: 10 TABLET ORAL at 11:07

## 2019-07-10 RX ADMIN — MIDODRINE HYDROCHLORIDE 2.5 MG: 2.5 TABLET ORAL at 05:07

## 2019-07-10 RX ADMIN — HEPARIN SODIUM 5000 UNITS: 5000 INJECTION, SOLUTION INTRAVENOUS; SUBCUTANEOUS at 05:07

## 2019-07-10 RX ADMIN — METOPROLOL SUCCINATE 25 MG: 25 TABLET, FILM COATED, EXTENDED RELEASE ORAL at 08:07

## 2019-07-10 RX ADMIN — INSULIN ASPART 4 UNITS: 100 INJECTION, SOLUTION INTRAVENOUS; SUBCUTANEOUS at 11:07

## 2019-07-10 RX ADMIN — PANCRELIPASE 2 CAPSULE: 30000; 6000; 19000 CAPSULE, DELAYED RELEASE PELLETS ORAL at 08:07

## 2019-07-10 RX ADMIN — INSULIN ASPART 4 UNITS: 100 INJECTION, SOLUTION INTRAVENOUS; SUBCUTANEOUS at 05:07

## 2019-07-10 RX ADMIN — INSULIN DETEMIR 14 UNITS: 100 INJECTION, SOLUTION SUBCUTANEOUS at 09:07

## 2019-07-10 RX ADMIN — PANCRELIPASE 2 CAPSULE: 30000; 6000; 19000 CAPSULE, DELAYED RELEASE PELLETS ORAL at 11:07

## 2019-07-10 RX ADMIN — SUCRALFATE 1 G: 1 SUSPENSION ORAL at 11:07

## 2019-07-10 RX ADMIN — ALBUMIN HUMAN 12.5 G: 0.25 SOLUTION INTRAVENOUS at 02:07

## 2019-07-10 RX ADMIN — ALBUMIN HUMAN 12.5 G: 0.25 SOLUTION INTRAVENOUS at 10:07

## 2019-07-10 RX ADMIN — IRON SUCROSE 100 MG: 20 INJECTION, SOLUTION INTRAVENOUS at 06:07

## 2019-07-10 RX ADMIN — ALBUMIN HUMAN 12.5 G: 0.25 SOLUTION INTRAVENOUS at 06:07

## 2019-07-10 RX ADMIN — PANCRELIPASE 2 CAPSULE: 30000; 6000; 19000 CAPSULE, DELAYED RELEASE PELLETS ORAL at 05:07

## 2019-07-10 RX ADMIN — HEPARIN SODIUM 5000 UNITS: 5000 INJECTION, SOLUTION INTRAVENOUS; SUBCUTANEOUS at 09:07

## 2019-07-10 RX ADMIN — PANCRELIPASE 1 CAPSULE: 24000; 76000; 120000 CAPSULE, DELAYED RELEASE PELLETS ORAL at 08:07

## 2019-07-10 RX ADMIN — INSULIN ASPART 4 UNITS: 100 INJECTION, SOLUTION INTRAVENOUS; SUBCUTANEOUS at 08:07

## 2019-07-10 RX ADMIN — SUCRALFATE 1 G: 1 SUSPENSION ORAL at 05:07

## 2019-07-10 RX ADMIN — ISOSORBIDE MONONITRATE 30 MG: 30 TABLET, EXTENDED RELEASE ORAL at 08:07

## 2019-07-10 RX ADMIN — MIDODRINE HYDROCHLORIDE 2.5 MG: 2.5 TABLET ORAL at 11:07

## 2019-07-10 RX ADMIN — CYANOCOBALAMIN 1000 MCG: 1000 INJECTION, SOLUTION INTRAMUSCULAR at 08:07

## 2019-07-10 RX ADMIN — INSULIN ASPART 1 UNITS: 100 INJECTION, SOLUTION INTRAVENOUS; SUBCUTANEOUS at 09:07

## 2019-07-10 RX ADMIN — MIDODRINE HYDROCHLORIDE 2.5 MG: 2.5 TABLET ORAL at 08:07

## 2019-07-10 RX ADMIN — HEPARIN SODIUM 5000 UNITS: 5000 INJECTION, SOLUTION INTRAVENOUS; SUBCUTANEOUS at 03:07

## 2019-07-10 RX ADMIN — METOCLOPRAMIDE 10 MG: 10 TABLET ORAL at 06:07

## 2019-07-10 RX ADMIN — CLINDAMYCIN IN 5 PERCENT DEXTROSE 600 MG: 12 INJECTION, SOLUTION INTRAVENOUS at 05:07

## 2019-07-10 RX ADMIN — PANCRELIPASE 1 CAPSULE: 24000; 76000; 120000 CAPSULE, DELAYED RELEASE PELLETS ORAL at 11:07

## 2019-07-10 RX ADMIN — ATORVASTATIN CALCIUM 40 MG: 40 TABLET, FILM COATED ORAL at 08:07

## 2019-07-10 RX ADMIN — FAMOTIDINE 20 MG: 20 TABLET, FILM COATED ORAL at 08:07

## 2019-07-10 NOTE — PLAN OF CARE
Per [facility] Discharge planner nurse Martina [phone], the following facilities are in network with patient's insurance.  [facility], [facility], [facility], and [facility].

## 2019-07-10 NOTE — PROGRESS NOTES
NEUROENDOCRINE SURGERY PROGRESS NOTE    63yoM with multiple comorbidities with extrahepatic PV occlusion plus mild cirrhosis with CRI and mild CHF contributing to recalcitrant ascites plus non-compliance with sodium/fluids as well as fatigue due to hypokalemia and fluid overload, now s/p paracentesis, transhepatic portal venogram, pressure measurements, and venoplasty with IR 6/28/19. Complicated by ELIEZER.     INTERVAL HISTORY  No acute events  Afebrile VSS  No CP/SOB  Denies n/v, tolerating regular diet  Ambulating with PT, up in chair.     Temp:  [97.9 °F (36.6 °C)-98.5 °F (36.9 °C)] 98.1 °F (36.7 °C)  Pulse:  [] 89  Resp:  [16-22] 20  SpO2:  [92 %-96 %] 94 %  BP: ()/(52-59) 95/58    I/O last 3 completed shifts:  In: 3637.2 [P.O.:800; I.V.:1286.4; Blood:251.7; IV Piggyback:250]  Out: 3142 [Urine:3140; Stool:2]    EXAM  GEN: A&Ox3, NAD  CV: RRR  RESP: Non-labored breathing  ABD: Obese, soft, NT/ND.   EXT: BLE with worsening edema today still some remaining erythema with mepilex over open wounds, no purulence   : George removed    LABS  Recent Labs     07/08/19  1531 07/09/19  0509 07/10/19  0530   WBC 3.83* 5.46 4.35   HGB 6.8* 7.8* 8.2*   HCT 22.1* 25.5* 26.9*    173 179     CMP  Recent Labs     07/08/19  0530 07/09/19  0509 07/10/19  0530    136 136   K 4.1 3.9 4.0    107 108   CO2 24 23 23   * 281* 144*   BUN 44* 40* 39*   CREATININE 4.2* 3.8* 3.4*   CALCIUM 8.2* 8.0* 8.2*   PROT 4.6* 4.5* 4.9*   ALBUMIN 3.3* 3.1* 3.2*   BILITOT 0.4 1.1* 0.5   ALKPHOS 81 102 122   AST 25 35 47*   ALT 21 30 46*   ANIONGAP 6* 6* 5*   ESTGFRAFRICA 16* 18* 21*   EGFRNONAA 14* 16* 18*     Recent Labs     07/08/19  0530 07/09/19  0509 07/10/19  0530   MG 1.9 2.0 2.1   PHOS 4.1 4.0 3.5     UA unremarkable    IMAGING  No new imaging    ASSESSMENT/PLAN  63yoM with multiple comorbidities with extrahepatic PV occlusion plus mild cirrhosis with CRI and mild CHF contributing to recalcitrant ascites plus  non-compliance with sodium/fluids as well as fatigue due to hypokalemia and fluid overload, now s/p paracentesis, transhepatic portal venogram, pressure measurements, and venoplasty with IR 6/28/19. Complicated by ELIEZER     NEURO: continue pain control prn  RESP: stable, continue IS, duonebs prn  CV: Chest pain resolved, hypotension improved  RENAL: UOP stabilizing, 1660 over 24 hours. Replacing q2h with 1/2NS. Cr continues to downtrend.   FEN/GI: Low potassium diabetic diet, Discontinue clinimix. EGD with no varices. Follow up biopsy.    HEME: stable s/p 2uprbc   ID: Afebrile no leukocytosis. Completing course of clinda today. Monitor fevers.   ENDOCRINE: CBG remains, continue pm levemir at 14 u (home dose) and 4u novolog with meals. May normalize off clinimix, will monitor  MSK: Ambulate with PT/OT, elevate legs, wound care to open wounds   PPX: SQH, Pepcid  DISPO: likely home tomorrow, to be discharged on subcutaneous heparin     Reyna Barajas MD  [employer] General Surgery PGY-2

## 2019-07-10 NOTE — PLAN OF CARE
Problem: Physical Therapy Goal  Goal: Physical Therapy Goal  Goals to be met by: 2019    Patient will increase functional independence with mobility by performin. Sit to supine with Mod I  2. Sit to stand transfer with Mod I  3. Bed to chair transfer with Supervision using Rolling Walker  3. Gait  x50 feet with Supervision using Rolling Walker.   4. Lower extremity exercise program x12 reps per handout, with supervision       Outcome: Ongoing (interventions implemented as appropriate)  Goals ongoing

## 2019-07-10 NOTE — PLAN OF CARE
VN note: VN cued into patient's room for introduction. Patient sitting in chair at this time. VN informed patient that VN would be working closely along side bedside nurse, PCT, and the rest of care team and making rounds throughout the shift.He verbalized understanding. Allowed time for questions. VN will continue to be available to patient and intervene prn.    --Patient's pump was beeping while VN in room. Bedside Nurse Hailey notified.     07/10/19 1018   Type of Frequent Check   Type Patient Rounds;Other (see comments)  (VN Rounds)   Safety/Activity   Patient Rounds bed in low position;visualized patient;call light in patient/parent reach   Safety Promotion/Fall Prevention side rails raised x 2   Activity Management activity adjusted per tolerance;up in chair   Positioning   Body Position positioned/repositioned independently   Head of Bed (HOB) HOB at 90 degrees   Assessments (Pre/Post)   Level of Consciousness (AVPU) alert

## 2019-07-10 NOTE — PT/OT/SLP PROGRESS
Occupational Therapy   Treatment    Name: Jian Arrieta  MRN: [id number]  Admitting Diagnosis:  Portal hypertension  2 Days Post-Op    Recommendations:     Discharge Recommendations: nursing facility, skilled  Discharge Equipment Recommendations:  other (see comments)(TBD)  Barriers to discharge:  Decreased caregiver support, Inaccessible home environment    Assessment:     Jian Arrieta is a 63 y.o. male with a medical diagnosis of Portal hypertension.  Performance deficits affecting function are weakness, impaired endurance, impaired self care skills, impaired functional mobilty, gait instability, impaired balance, decreased lower extremity function, decreased safety awareness, pain, edema, impaired skin.  Pt found up in chair, anxious to get to bathroom.  Pt requires vc's for safety as pt impulsive and moves quickly to get chair to sit secondary to decreased strength and endurance. Pt requires CGA for transfers and ambulation using RW. Continue OT services to address functional goals, progressing as able.      Rehab Prognosis:  Good; patient would benefit from acute skilled OT services to address these deficits and reach maximum level of function.       Plan:     Patient to be seen 5 x/week to address the above listed problems via self-care/home management, therapeutic activities, therapeutic exercises  · Plan of Care Expires: 08/04/19  · Plan of Care Reviewed with: patient    Subjective     Pain/Comfort:  · Pain Rating 1: 0/10  · Pain Rating Post-Intervention 1: 0/10    Objective:     Communicated with: RN prior to session.  Patient found up in chair with PICC line, telemetry upon OT entry to room.    General Precautions: Standard, fall, hearing impaired   Orthopedic Precautions:N/A   Braces: N/A     Occupational Performance:     Functional Mobility/Transfers:  · Patient completed Sit <> Stand Transfer with stand by assistance and contact guard assistance  with  rolling walker   · Patient completed Bed <> Chair  Transfer using Stand Pivot technique with contact guard assistance with rolling walker  · Patient completed Toilet Transfer Stand Pivot technique with contact guard assistance with  rolling walker and grab bars  · Functional Mobility: Pt ambulated room distances with CGA and vc's for RW safety/mgmt.     Activities of Daily Living:  · Grooming: stand by assistance and contact guard assistance standing at sink to quickly brush teeth.  Declined washing face or combing hair secondary to need to sit.   · Toileting: stand by assistance perianal care in seated position      [facility] 6 Click ADL: 18    Treatment & Education:  Pt declined UE therex as pt reports he performs Independently throughout day.     Patient left up in chair with all lines intact, call button in reach and chair alarm onEducation:      GOALS:   Multidisciplinary Problems     Occupational Therapy Goals        Problem: Occupational Therapy Goal    Goal Priority Disciplines Outcome Interventions   Occupational Therapy Goal     OT, PT/OT Ongoing (interventions implemented as appropriate)    Description:  Goals to be met by: 7/29/2019    Patient will increase functional independence with ADLs by performing:    UE Dressing with Modified [place].  LE Dressing with Modified [place].  Toileting from toilet with Modified [place] for hygiene and clothing management.   Supine to sit with Modified [place].  Step transfer with Modified [place]  Toilet transfer to toilet with Modified [place].  Increased functional strength to WFL for ADLS.  Upper extremity exercise program 10 reps per handout, with supervision.                      Time Tracking:     OT Date of Treatment: 07/10/19  OT Start Time: 0935  OT Stop Time: 0958  OT Total Time (min): 23 min    Billable Minutes:Self Care/Home Management 23    AFIA Martínez  7/10/2019

## 2019-07-10 NOTE — PT/OT/SLP PROGRESS
Occupational Therapy   Treatment    Name: Jian Arrieta  MRN: [id number]  Admitting Diagnosis:  Portal hypertension  2 Days Post-Op    Recommendations:     Discharge Recommendations: nursing facility, skilled  Discharge Equipment Recommendations:  (Defer to SNF)  Barriers to discharge:  Decreased caregiver support, Inaccessible home environment    Assessment:   Patient making good progress towards goals. Will benefit from SNF upon D/C.     Jian Arrieta is a 63 y.o. male with a medical diagnosis of Portal hypertension. Performance deficits affecting function are weakness, impaired endurance, impaired self care skills, impaired functional mobilty, gait instability, impaired balance, decreased upper extremity function, decreased lower extremity function, decreased coordination, pain, decreased ROM, impaired skin, edema.     Rehab Prognosis:  Good; patient would benefit from acute skilled OT services to address these deficits and reach maximum level of function.       Plan:     Patient to be seen 5 x/week to address the above listed problems via self-care/home management, therapeutic activities, therapeutic exercises  · Plan of Care Expires: 08/04/19  · Plan of Care Reviewed with: patient    Subjective     Pain/Comfort:  · Pain Rating 1: 0/10  · Pain Rating Post-Intervention 1: 0/10    Objective:     Communicated with: Hailey de la paz prior to session.  Patient found up in chair with bed alarm, peripheral IV, telemetry upon OT entry to room.    General Precautions: Standard, fall, hearing impaired   Orthopedic Precautions:N/A   Braces: N/A     Bed Mobility:    · Patient completed Scooting/Bridging with stand by assistance  · Patient completed Sit to Supine with stand by assistance and contact guard assistance     Functional Mobility/Transfers:  · Patient completed Sit <> Stand Transfer with contact guard assistance  with  hand-held assist   · Patient completed Bed <> Chair Transfer using Step Transfer technique with  contact guard assistance with hand-held assist    Activities of Daily Living:  · Lower Body Dressing: total assistance to doff shoes    [facility] 6 Click ADL: 19    Treatment & Education:  Patient UIC and calling out to [facility] in [place] and requesting to return to bed. Patient declined to use RW to t/f, completed as above. Patient with improved bed mobility this date. Patient requesting (A) to use [employer] to order items. (A) provided, along /c VCs on how to make ordering easier and enlarge font and picture sizes.    Patient left HOB elevated with all lines intact, call button in reach, bed alarm on and nurse notifiedEducation:      GOALS:   Multidisciplinary Problems     Occupational Therapy Goals        Problem: Occupational Therapy Goal    Goal Priority Disciplines Outcome Interventions   Occupational Therapy Goal     OT, PT/OT Ongoing (interventions implemented as appropriate)    Description:  Goals to be met by: 7/29/2019    Patient will increase functional independence with ADLs by performing:    UE Dressing with Modified [place].  LE Dressing with Modified [place].  Toileting from toilet with Modified [place] for hygiene and clothing management.   Supine to sit with Modified [place].  Step transfer with Modified [place]  Toilet transfer to toilet with Modified [place].  Increased functional strength to WFL for ADLS.  Upper extremity exercise program 10 reps per handout, with supervision.                      Time Tracking:     OT Date of Treatment: 07/10/19  OT Start Time: 1445  OT Stop Time: 1510  OT Total Time (min): 25 min    Billable Minutes:Self Care/Home Management 10  Therapeutic Activity 15    AFIA Melendez  7/10/2019

## 2019-07-10 NOTE — PLAN OF CARE
Problem: Occupational Therapy Goal  Goal: Occupational Therapy Goal  Goals to be met by: 7/29/2019    Patient will increase functional independence with ADLs by performing:    UE Dressing with Modified [place].  LE Dressing with Modified [place].  Toileting from toilet with Modified [place] for hygiene and clothing management.   Supine to sit with Modified [place].  Step transfer with Modified [place]  Toilet transfer to toilet with Modified [place].  Increased functional strength to WFL for ADLS.  Upper extremity exercise program 10 reps per handout, with supervision.     Outcome: Ongoing (interventions implemented as appropriate)  Jian Arrieta is a 63 y.o. male with a medical diagnosis of Portal hypertension.  Performance deficits affecting function are weakness, impaired endurance, impaired self care skills, impaired functional mobilty, gait instability, impaired balance, decreased lower extremity function, decreased safety awareness, pain, edema, impaired skin.  Pt found up in chair, anxious to get to bathroom.  Pt requires vc's for safety as pt impulsive and moves quickly to get chair to sit secondary to decreased strength and endurance. Pt requires CGA for transfers and ambulation using RW. Continue OT services to address functional goals, progressing as able.    KEYANA Martínez/L

## 2019-07-10 NOTE — PROGRESS NOTES
Jian Arrieta #[id number] (CSN: [id number]) (63 y.o. M) (Adm: 06/27/19)   [facility] [id number] A   PCP     JEANNIE TRUONG   Date of Birth     1955   Demographics     Address: Home Phone: Work Phone: Mobile Phone:     [id number] [phone]    SSN: Insurance: Marital Status: Christian:      [employer] [facility]  M[employer]onism    Admission Dx     PORTAL HYPERTENSION    Portal hypertension    Portal hypertension    Portal hypertension   Chief Complaint     None   Documents Filed to Patient     Power of  Living Will Clinical Unknown Study Attachment Consent Form ABN Waiver After Visit Summary Lab Result Scan Code Status Patient Portal Status   Not on File Not on File Not on File Not on File Filed Not on File Filed Not on File FULL [Updated on 06/27/19 1934] Active   Auth/Cert Information      Open Auth/Cert for Hospital Account [id number]      Admission Information     Attending Provider Admitting Provider Admission Type Admission Date/Time   MD SON Napoles MD Critical Care Direct Admit 06/27/19  1823   Discharge Date Hospital Service Auth/Cert Status Service Area    Surgery Incomplete [facility]   Unit Room/Bed Admission Status    [facility] MEDICAL SURGICAL UNIT ACUTE K531/K531 A Admission (Confirmed)    Hospital Account     Name Acct ID Class Status Primary Coverage   Jian Arrieta [id number] IP- Inpatient Open [employer]          Guarantor Account (for Hospital Account #[id number])     Name Relation to Pt Service Area Active? Acct Type   Jian Arrieta Self OHSSA Yes Personal/Family   Address Phone     [address] [address] [phone](H)            Coverage Information (for Hospital Account #[id number])     F/O Payor/Plan Precert #   [employer] CHOICE    Subscriber Subscriber #   Jian Arrieta [id number]   Address Phone   [id number]  [place] [address] [phone]          Emergency Contact Information     Name: Geri Arrieta Relationship: Spouse   Address: [address] [place] [address]    City: [place] State: [place] Zip: [address] Phone:     Business phone:

## 2019-07-10 NOTE — PLAN OF CARE
Problem: Adult Inpatient Plan of Care  Goal: Plan of Care Review  Outcome: Ongoing (interventions implemented as appropriate)  Pt AAOx4, no family members at bedside throughout shift. Pt denies pain, n/v/d and SOB. Regular diet tolerated well. IVF infusing to subclavian PICC - dressing CDI. New foam dressing applied to skin tears on bilat arms. Cardiac monitoring and blood glucose checks continued. Safety maintained, bed alarm on - will cont to monitor.

## 2019-07-10 NOTE — PT/OT/SLP PROGRESS
Physical Therapy Treatment    Patient Name:  Jian Arrieta   MRN:  [id number]    Recommendations:     Discharge Recommendations:  nursing facility, skilled   Discharge Equipment Recommendations: (defer to SNF)   Barriers to discharge: decreased mobility,endurance and strength    Assessment:     Jian Arrieta is a 63 y.o. male admitted with a medical diagnosis of Portal hypertension.  He presents with the following impairments/functional limitations:  weakness, impaired endurance, impaired functional mobilty, gait instability, impaired balance, decreased lower extremity function, decreased safety awareness, impaired coordination, impaired skin,pt with improving status and requires S/SBA for safety with mobility,pt will benefit from SNF upon discharge.    Rehab Prognosis: Good; patient would benefit from acute skilled PT services to address these deficits and reach maximum level of function.    Recent Surgery: Procedure(s) (LRB):  ESOPHAGOGASTRODUODENOSCOPY (EGD) (N/A) 2 Days Post-Op    Plan:     During this hospitalization, patient to be seen 6 x/week to address the identified rehab impairments via gait training, therapeutic activities, therapeutic exercises and progress toward the following goals:    · Plan of Care Expires:  08/07/19    Subjective     Chief Complaint: n/a  Patient/Family Comments/goals: pt agreeable to up in chair.  Pain/Comfort:  · Pain Rating 1: 0/10      Objective:     Communicated with nsg prior to session.  Patient found up in chair with bed alarm, peripheral IV, central line, telemetry upon PT entry to room.     General Precautions: Standard, fall, hearing impaired   Orthopedic Precautions:N/A   Braces: N/A     Functional Mobility:  · Bed Mobility:     · Supine to Sit: supervision  · Transfers:     · Sit to Stand:  supervision with rolling walker  · Bed to Chair: stand by assistance with  rolling walker  using  ambulation  · Gait: amb ~ 50' X 2 trials with SBA and IV in tow  · Balance: fair  standing balance with RW      AM-PAC 6 CLICK MOBILITY  Turning over in bed (including adjusting bedclothes, sheets and blankets)?: 4  Sitting down on and standing up from a chair with arms (e.g., wheelchair, bedside commode, etc.): 3  Moving from lying on back to sitting on the side of the bed?: 3  Moving to and from a bed to a chair (including a wheelchair)?: 3  Need to walk in hospital room?: 3  Climbing 3-5 steps with a railing?: 2  Basic Mobility Total Score: 18       Therapeutic Activities and Exercises: le seated ex's X 10-12 reps inc: ap,laq,hip flex,abd/add       Patient left up in chair with all lines intact, call button in reach, chair alarm on and nsg notified..    GOALS: see general POC  Multidisciplinary Problems     Physical Therapy Goals        Problem: Physical Therapy Goal    Goal Priority Disciplines Outcome Goal Variances Interventions   Physical Therapy Goal     PT, PT/OT Ongoing (interventions implemented as appropriate)     Description:  Goals to be met by: 2019    Patient will increase functional independence with mobility by performin. Sit to supine with Mod I  2. Sit to stand transfer with Mod I  3. Bed to chair transfer with Supervision using Rolling Walker  3. Gait  x50 feet with Supervision using Rolling Walker.   4. Lower extremity exercise program x12 reps per handout, with supervision                        Time Tracking:     PT Received On: 07/10/19  PT Start Time: 803     PT Stop Time: 829  PT Total Time (min): 26 min     Billable Minutes: Gait Training 16 and Therapeutic Exercise 10    Treatment Type: Treatment  PT/PTA: PTA     PTA Visit Number: 3     Thom Reyes, RICHY  07/10/2019

## 2019-07-10 NOTE — PLAN OF CARE
Problem: Occupational Therapy Goal  Goal: Occupational Therapy Goal  Goals to be met by: 7/29/2019    Patient will increase functional independence with ADLs by performing:    UE Dressing with Modified [place].  LE Dressing with Modified [place].  Toileting from toilet with Modified [place] for hygiene and clothing management.   Supine to sit with Modified [place].  Step transfer with Modified [place]  Toilet transfer to toilet with Modified [place].  Increased functional strength to WFL for ADLS.  Upper extremity exercise program 10 reps per handout, with supervision.     Outcome: Ongoing (interventions implemented as appropriate)  Patient making good progress towards goals. Will benefit from SNF upon D/C.

## 2019-07-11 LAB
ALBUMIN SERPL BCP-MCNC: 3.3 G/DL (ref 3.5–5.2)
ALP SERPL-CCNC: 121 U/L (ref 55–135)
ALT SERPL W/O P-5'-P-CCNC: 41 U/L (ref 10–44)
ANION GAP SERPL CALC-SCNC: 7 MMOL/L (ref 8–16)
AST SERPL-CCNC: 39 U/L (ref 10–40)
BASOPHILS # BLD AUTO: 0.04 K/UL (ref 0–0.2)
BASOPHILS NFR BLD: 0.9 % (ref 0–1.9)
BILIRUB SERPL-MCNC: 0.4 MG/DL (ref 0.1–1)
BUN SERPL-MCNC: 36 MG/DL (ref 8–23)
CALCIUM SERPL-MCNC: 8.2 MG/DL (ref 8.7–10.5)
CHLORIDE SERPL-SCNC: 110 MMOL/L (ref 95–110)
CO2 SERPL-SCNC: 22 MMOL/L (ref 23–29)
CREAT SERPL-MCNC: 3.3 MG/DL (ref 0.5–1.4)
DIFFERENTIAL METHOD: ABNORMAL
EOSINOPHIL # BLD AUTO: 0.1 K/UL (ref 0–0.5)
EOSINOPHIL NFR BLD: 3 % (ref 0–8)
ERYTHROCYTE [DISTWIDTH] IN BLOOD BY AUTOMATED COUNT: 19.5 % (ref 11.5–14.5)
EST. GFR  (AFRICAN AMERICAN): 22 ML/MIN/1.73 M^2
EST. GFR  (NON AFRICAN AMERICAN): 19 ML/MIN/1.73 M^2
GLUCOSE SERPL-MCNC: 90 MG/DL (ref 70–110)
HCT VFR BLD AUTO: 27.5 % (ref 40–54)
HGB BLD-MCNC: 8.4 G/DL (ref 14–18)
INR PPP: 1.1 (ref 0.8–1.2)
LYMPHOCYTES # BLD AUTO: 1.2 K/UL (ref 1–4.8)
LYMPHOCYTES NFR BLD: 25.8 % (ref 18–48)
MAGNESIUM SERPL-MCNC: 2.2 MG/DL (ref 1.6–2.6)
MCH RBC QN AUTO: 29.4 PG (ref 27–31)
MCHC RBC AUTO-ENTMCNC: 30.5 G/DL (ref 32–36)
MCV RBC AUTO: 96 FL (ref 82–98)
MONOCYTES # BLD AUTO: 0.6 K/UL (ref 0.3–1)
MONOCYTES NFR BLD: 13.2 % (ref 4–15)
NEUTROPHILS # BLD AUTO: 2.6 K/UL (ref 1.8–7.7)
NEUTROPHILS NFR BLD: 57.1 % (ref 38–73)
PHOSPHATE SERPL-MCNC: 3.2 MG/DL (ref 2.7–4.5)
PLATELET # BLD AUTO: 176 K/UL (ref 150–350)
PMV BLD AUTO: 8.9 FL (ref 9.2–12.9)
POCT GLUCOSE: 122 MG/DL (ref 70–110)
POCT GLUCOSE: 193 MG/DL (ref 70–110)
POCT GLUCOSE: 202 MG/DL (ref 70–110)
POCT GLUCOSE: 86 MG/DL (ref 70–110)
POTASSIUM SERPL-SCNC: 4 MMOL/L (ref 3.5–5.1)
PROT SERPL-MCNC: 4.9 G/DL (ref 6–8.4)
PROTHROMBIN TIME: 11.4 SEC (ref 9–12.5)
RBC # BLD AUTO: 2.86 M/UL (ref 4.6–6.2)
SODIUM SERPL-SCNC: 139 MMOL/L (ref 136–145)
WBC # BLD AUTO: 4.62 K/UL (ref 3.9–12.7)

## 2019-07-11 PROCEDURE — 25000003 PHARM REV CODE 250: Performed by: STUDENT IN AN ORGANIZED HEALTH CARE EDUCATION/TRAINING PROGRAM

## 2019-07-11 PROCEDURE — 94761 N-INVAS EAR/PLS OXIMETRY MLT: CPT

## 2019-07-11 PROCEDURE — 63600175 PHARM REV CODE 636 W HCPCS: Mod: JG | Performed by: STUDENT IN AN ORGANIZED HEALTH CARE EDUCATION/TRAINING PROGRAM

## 2019-07-11 PROCEDURE — 63600175 PHARM REV CODE 636 W HCPCS: Performed by: STUDENT IN AN ORGANIZED HEALTH CARE EDUCATION/TRAINING PROGRAM

## 2019-07-11 PROCEDURE — 97110 THERAPEUTIC EXERCISES: CPT

## 2019-07-11 PROCEDURE — 80053 COMPREHEN METABOLIC PANEL: CPT

## 2019-07-11 PROCEDURE — 86580 TB INTRADERMAL TEST: CPT | Performed by: STUDENT IN AN ORGANIZED HEALTH CARE EDUCATION/TRAINING PROGRAM

## 2019-07-11 PROCEDURE — 11000001 HC ACUTE MED/SURG PRIVATE ROOM

## 2019-07-11 PROCEDURE — 84100 ASSAY OF PHOSPHORUS: CPT

## 2019-07-11 PROCEDURE — 83735 ASSAY OF MAGNESIUM: CPT

## 2019-07-11 PROCEDURE — 85610 PROTHROMBIN TIME: CPT

## 2019-07-11 PROCEDURE — 97116 GAIT TRAINING THERAPY: CPT

## 2019-07-11 PROCEDURE — 30200315 PPD INTRADERMAL TEST REV CODE 302: Performed by: STUDENT IN AN ORGANIZED HEALTH CARE EDUCATION/TRAINING PROGRAM

## 2019-07-11 PROCEDURE — 25000003 PHARM REV CODE 250: Performed by: SURGERY

## 2019-07-11 PROCEDURE — P9047 ALBUMIN (HUMAN), 25%, 50ML: HCPCS | Mod: JG | Performed by: STUDENT IN AN ORGANIZED HEALTH CARE EDUCATION/TRAINING PROGRAM

## 2019-07-11 PROCEDURE — 85025 COMPLETE CBC W/AUTO DIFF WBC: CPT

## 2019-07-11 PROCEDURE — 97803 MED NUTRITION INDIV SUBSEQ: CPT | Performed by: DIETITIAN, REGISTERED

## 2019-07-11 PROCEDURE — 63600175 PHARM REV CODE 636 W HCPCS: Performed by: SURGERY

## 2019-07-11 RX ORDER — FUROSEMIDE 10 MG/ML
20 INJECTION INTRAMUSCULAR; INTRAVENOUS 2 TIMES DAILY
Status: DISCONTINUED | OUTPATIENT
Start: 2019-07-11 | End: 2019-07-13

## 2019-07-11 RX ORDER — SODIUM CHLORIDE 450 MG/100ML
INJECTION, SOLUTION INTRAVENOUS CONTINUOUS
Status: DISCONTINUED | OUTPATIENT
Start: 2019-07-11 | End: 2019-07-11

## 2019-07-11 RX ORDER — CLOPIDOGREL BISULFATE 75 MG/1
75 TABLET ORAL DAILY
Status: DISCONTINUED | OUTPATIENT
Start: 2019-07-11 | End: 2019-07-15 | Stop reason: HOSPADM

## 2019-07-11 RX ADMIN — EPOETIN ALFA-EPBX 10000 UNITS: 10000 INJECTION, SOLUTION INTRAVENOUS; SUBCUTANEOUS at 09:07

## 2019-07-11 RX ADMIN — METOCLOPRAMIDE 10 MG: 10 TABLET ORAL at 10:07

## 2019-07-11 RX ADMIN — INSULIN ASPART 1 UNITS: 100 INJECTION, SOLUTION INTRAVENOUS; SUBCUTANEOUS at 09:07

## 2019-07-11 RX ADMIN — MIDODRINE HYDROCHLORIDE 2.5 MG: 2.5 TABLET ORAL at 12:07

## 2019-07-11 RX ADMIN — PANCRELIPASE 1 CAPSULE: 24000; 76000; 120000 CAPSULE, DELAYED RELEASE PELLETS ORAL at 04:07

## 2019-07-11 RX ADMIN — APIXABAN 5 MG: 5 TABLET, FILM COATED ORAL at 10:07

## 2019-07-11 RX ADMIN — SUCRALFATE 1 G: 1 SUSPENSION ORAL at 06:07

## 2019-07-11 RX ADMIN — SUCRALFATE 1 G: 1 SUSPENSION ORAL at 12:07

## 2019-07-11 RX ADMIN — FUROSEMIDE 20 MG: 10 INJECTION, SOLUTION INTRAMUSCULAR; INTRAVENOUS at 10:07

## 2019-07-11 RX ADMIN — TUBERCULIN PURIFIED PROTEIN DERIVATIVE 5 UNITS: 5 INJECTION INTRADERMAL at 12:07

## 2019-07-11 RX ADMIN — INSULIN DETEMIR 14 UNITS: 100 INJECTION, SOLUTION SUBCUTANEOUS at 09:07

## 2019-07-11 RX ADMIN — METOCLOPRAMIDE 10 MG: 10 TABLET ORAL at 04:07

## 2019-07-11 RX ADMIN — PANCRELIPASE 2 CAPSULE: 30000; 6000; 19000 CAPSULE, DELAYED RELEASE PELLETS ORAL at 09:07

## 2019-07-11 RX ADMIN — FAMOTIDINE 20 MG: 20 TABLET, FILM COATED ORAL at 09:07

## 2019-07-11 RX ADMIN — PANCRELIPASE 2 CAPSULE: 30000; 6000; 19000 CAPSULE, DELAYED RELEASE PELLETS ORAL at 12:07

## 2019-07-11 RX ADMIN — INSULIN ASPART 4 UNITS: 100 INJECTION, SOLUTION INTRAVENOUS; SUBCUTANEOUS at 12:07

## 2019-07-11 RX ADMIN — TAMSULOSIN HYDROCHLORIDE 0.4 MG: 0.4 CAPSULE ORAL at 09:07

## 2019-07-11 RX ADMIN — ATORVASTATIN CALCIUM 40 MG: 40 TABLET, FILM COATED ORAL at 09:07

## 2019-07-11 RX ADMIN — PANCRELIPASE 1 CAPSULE: 24000; 76000; 120000 CAPSULE, DELAYED RELEASE PELLETS ORAL at 12:07

## 2019-07-11 RX ADMIN — ISOSORBIDE MONONITRATE 30 MG: 30 TABLET, EXTENDED RELEASE ORAL at 09:07

## 2019-07-11 RX ADMIN — CYANOCOBALAMIN 1000 MCG: 1000 INJECTION, SOLUTION INTRAMUSCULAR at 09:07

## 2019-07-11 RX ADMIN — SUCRALFATE 1 G: 1 SUSPENSION ORAL at 11:07

## 2019-07-11 RX ADMIN — APIXABAN 5 MG: 5 TABLET, FILM COATED ORAL at 09:07

## 2019-07-11 RX ADMIN — MIDODRINE HYDROCHLORIDE 2.5 MG: 2.5 TABLET ORAL at 04:07

## 2019-07-11 RX ADMIN — CLOPIDOGREL BISULFATE 75 MG: 75 TABLET ORAL at 10:07

## 2019-07-11 RX ADMIN — METOCLOPRAMIDE 10 MG: 10 TABLET ORAL at 06:07

## 2019-07-11 RX ADMIN — HEPARIN SODIUM 5000 UNITS: 5000 INJECTION, SOLUTION INTRAVENOUS; SUBCUTANEOUS at 06:07

## 2019-07-11 RX ADMIN — PANCRELIPASE 2 CAPSULE: 30000; 6000; 19000 CAPSULE, DELAYED RELEASE PELLETS ORAL at 04:07

## 2019-07-11 RX ADMIN — INSULIN ASPART 4 UNITS: 100 INJECTION, SOLUTION INTRAVENOUS; SUBCUTANEOUS at 04:07

## 2019-07-11 RX ADMIN — INSULIN ASPART 4 UNITS: 100 INJECTION, SOLUTION INTRAVENOUS; SUBCUTANEOUS at 09:07

## 2019-07-11 RX ADMIN — MIDODRINE HYDROCHLORIDE 2.5 MG: 2.5 TABLET ORAL at 09:07

## 2019-07-11 RX ADMIN — ALBUMIN HUMAN 12.5 G: 0.25 SOLUTION INTRAVENOUS at 04:07

## 2019-07-11 RX ADMIN — IRON SUCROSE 100 MG: 20 INJECTION, SOLUTION INTRAVENOUS at 06:07

## 2019-07-11 RX ADMIN — METOPROLOL SUCCINATE 25 MG: 25 TABLET, FILM COATED, EXTENDED RELEASE ORAL at 09:07

## 2019-07-11 RX ADMIN — PANCRELIPASE 1 CAPSULE: 24000; 76000; 120000 CAPSULE, DELAYED RELEASE PELLETS ORAL at 09:07

## 2019-07-11 RX ADMIN — FUROSEMIDE 20 MG: 10 INJECTION, SOLUTION INTRAMUSCULAR; INTRAVENOUS at 06:07

## 2019-07-11 NOTE — PROGRESS NOTES
NEUROENDOCRINE SURGERY PROGRESS NOTE    63yoM with multiple comorbidities with extrahepatic PV occlusion plus mild cirrhosis with CRI and mild CHF contributing to recalcitrant ascites plus non-compliance with sodium/fluids as well as fatigue due to hypokalemia and fluid overload, now s/p paracentesis, transhepatic portal venogram, pressure measurements, and venoplasty with IR 6/28/19. Complicated by ELIEZER.     INTERVAL HISTORY  No acute events  Afebrile VSS  No CP/SOB  Denies n/v, tolerating regular diet  Ambulating with PT, up in chair.     Temp:  [97.7 °F (36.5 °C)-98.1 °F (36.7 °C)] 98.1 °F (36.7 °C)  Pulse:  [77-93] 85  Resp:  [18-20] 19  SpO2:  [94 %-99 %] 97 %  BP: ()/(52-72) 129/59    I/O last 3 completed shifts:  In: 4009.3 [P.O.:1685; I.V.:1666; IV Piggyback:300]  Out: 2150 [Urine:2150]    EXAM  GEN: A&Ox3, NAD  CV: RRR  RESP: Non-labored breathing  ABD: Obese, soft, NT/ND.   EXT: BLE with worsening edema today still some remaining erythema with mepilex over open wounds, no purulence   : George removed    LABS  Recent Labs     07/09/19  0509 07/10/19  0530 07/11/19  0445   WBC 5.46 4.35 4.62   HGB 7.8* 8.2* 8.4*   HCT 25.5* 26.9* 27.5*    179 176     CMP  Recent Labs     07/09/19  0509 07/10/19  0530 07/11/19  0445    136 139   K 3.9 4.0 4.0    108 110   CO2 23 23 22*   * 144* 90   BUN 40* 39* 36*   CREATININE 3.8* 3.4* 3.3*   CALCIUM 8.0* 8.2* 8.2*   PROT 4.5* 4.9* 4.9*   ALBUMIN 3.1* 3.2* 3.3*   BILITOT 1.1* 0.5 0.4   ALKPHOS 102 122 121   AST 35 47* 39   ALT 30 46* 41   ANIONGAP 6* 5* 7*   ESTGFRAFRICA 18* 21* 22*   EGFRNONAA 16* 18* 19*     Recent Labs     07/09/19  0509 07/10/19  0530 07/11/19  0445   MG 2.0 2.1 2.2   PHOS 4.0 3.5 3.2     UA unremarkable    IMAGING  No new imaging    ASSESSMENT/PLAN  63yoM with multiple comorbidities with extrahepatic PV occlusion plus mild cirrhosis with CRI and mild CHF contributing to recalcitrant ascites plus non-compliance with  sodium/fluids as well as fatigue due to hypokalemia and fluid overload, now s/p paracentesis, transhepatic portal venogram, pressure measurements, and venoplasty with IR 6/28/19. Complicated by ELIEZER     NEURO: continue pain control prn  RESP: stable, continue IS, duonebs prn  CV: Chest pain resolved, hypotension improved  RENAL: UOP stabilizing, 1660 over 24 hours. Replacing q2h with 1/2NS.Stopped albumin.  FEN/GI: Low potassium diabetic diet, Discontinue clinimix. EGD with no varices. Follow up biopsy.    HEME: stable s/p 2uprbc   ID: Afebrile no leukocytosis. Completed clindamycin Monitor fevers.   ENDOCRINE: CBG remains, continue pm levemir at 14 u (home dose) and 4u novolog with meals. May normalize off clinimix, will monitor  MSK: Ambulate with PT/OT, elevate legs, wound care to open wounds   PPX: SQH, Pepcid  DISPO: pending placement, to be discharged on home navid Churchill MD  [employer] General Surgery PGY-2

## 2019-07-11 NOTE — PLAN OF CARE
VN cued into pt's room for introduction. VN informed pt that VN would be working along side bedside nurse and PCT throughout shift. Level of present pain assessed. At present no distress noted. Thoroughly discussed today's plan of care and up coming discharge.Discussed with patient High fall risk protocol and interventions that have been initiated and cont be in place for safety. Patient verbalized clear understanding and cooperation using teach back method. Bed alarm presently activated and in use. Will cont to be available to patient and intervene prn.

## 2019-07-11 NOTE — PROGRESS NOTES
[facility]  Adult Nutrition  Progress Note    SUMMARY       Recommendations    Recommendation:   1. Continue with Regular diet as ELPIDIO.    Goals: Pt to meet > 85% of calorie/protein needs   Nutrition Goal Status: goal met  Communication of RD Recs: discussed on rounds    Nutrition Discharge Planning: Home on 2000 calorie Renal diet     Reason for Assessment    Reason For Assessment: RD follow-up  Diagnosis: cardiac disease(Portal HTN)    Relevant Medical History:   Past Medical History:   Diagnosis Date    Alcohol abuse     Anasarca 1/28/2019    Arthropathy associated with neurological disorder 9/2/2015    Atherosclerosis     Charcot foot due to diabetes mellitus     Chronic combined systolic and diastolic heart failure 01/29/2019 1-28-19 Left VentricleModerate decreased ejection fraction at 30%. Normal left ventricular cavity size. Normal wall thickness observed. Grade I (mild) left ventricular diastolic dysfunction consistent with impaired relaxation. Normal left atrial pressure. Moderate, global hypokinesis(see wall scoring diagram). Right VentricleNormal cavity size, wall thickness and ejection fraction. Wall motion n    Chronic pancreatitis 1/28/2019    Colon polyps     approx 5 yrs ago    Coronary artery disease due to calcified coronary lesion 05/08/2015    5 stents on ASA      Diabetic polyneuropathy associated with type 2 diabetes mellitus 9/2/2015    Diverticulosis 1/28/2019    DM type 2 with diabetic peripheral neuropathy 2/4/2019    Encounter for blood transfusion     Essential hypertension 1/28/2019    Former smoker 8/26/2015    Healed ulcer of left foot on examination 6/20/2017    Hydrocele     approx 1.5 yrs ago    Hypoalbuminemia 2/4/2019    Lymphedema of both lower extremities 1/29/2019    Mixed hyperlipidemia 5/8/2015    Morbid obesity with BMI of 50.0-59.9, adult 5/8/2015    Onychomycosis of multiple toenails with type 2 diabetes mellitus and peripheral  neuropathy 6/20/2017    Perianal cyst     approx 2 yrs ago    Pseudocyst of pancreas 1/28/2019 1-28-19 Liver has a cirrhotic morphology with no focal lesions.  Significant interval increase in ascites when compared to prior exam which may account for patient's abdominal distension.  Hypodense air-fluid collection along the body of the pancreas which is slightly smaller when compared to prior CT.  Findings may relate to pancreatic necrosis with pancreatic pseudocysts with infected pseudocyst    Skin cancer     skin cancer    Sleep apnea 8/26/2015    Status post bariatric surgery 1/11/2016    Type 2 diabetes mellitus, with long-term current use of insulin 5/8/2015     Past Surgical History:   Procedure Laterality Date    ANGIOGRAM, CORONARY ARTERY Right 3/20/2019    Performed by Bob Duque MD at [facility] CATH LAB    ANGIOGRAM-PV STENT N/A 6/28/2019    Performed by [facility] Diagnostic Provider at [facility] OR    ANGIOPLASTY      total x5 stents    Bypass graft study  3/20/2019    Performed by Bob Duque MD at [facility] CATH LAB    COLONOSCOPY N/A 10/6/2015    Performed by Shekhar Richards MD at [facility] ENDO (2ND FLR)    CORONARY ARTERY BYPASS GRAFT  2017    x3    CYST REMOVAL      ESOPHAGOGASTRODUODENOSCOPY (EGD) N/A 7/8/2019    Performed by Huan Brumfield MD at [facility] ENDO    GASTRECTOMY      GASTRECTOMY-SLEEVE-LAPAROSCOPIC - 63989 N/A 12/22/2015    Performed by Micheal Villavicencio Jr., MD at [facility] OR 2ND FLR    KNEE ARTHROSCOPY      perianal surgery      perianal cyst removed    pleurx N/A 5/17/2019    [facility]       General Information Comments: NFPE completed on 7/4/19 with no changes from previous exam. Pt eating ~>75% of meals. Pt reports weight loss due to fluid shifts. Lower Leg edema notes but per pt this is much better.       Nutrition Risk Screen    Nutrition Risk Screen: no indicators present    Nutrition/Diet History    Patient Reported  "Diet/Restrictions/Preferences: general  Food Preferences: no Hindu or cultural food prefs identified  Spiritual, Cultural Beliefs, Sikhism Practices, Values that Affect Care: no  Food Allergies: NKFA  Factors Affecting Nutritional Intake: altered gastrointestinal function, altered taste    Anthropometrics    Temp: 97.5 °F (36.4 °C)  Height Method: Stated  Height: 5' 7" (170.2 cm)  Height (inches): 67 in  Weight Method: Bed Scale  Weight: 133.3 kg (293 lb 14 oz)  Weight (lb): 293.88 lb  Ideal Body Weight (IBW), Male: 148 lb  % Ideal Body Weight, Male (lb): 198.57 lb  BMI (Calculated): 46.1  BMI Grade: greater than 40 - morbid obesity  Usual Body Weight (UBW), k.4 kg(Admit wt on 19)  Weight Change Amount: 28 lb 7 oz  % Usual Body Weight: 110.95  % Weight Change From Usual Weight: 10.71 %       Lab/Procedures/Meds    Pertinent Labs Reviewed: reviewed  Recent Labs   Lab 19  0445      K 4.0      CO2 22*   BUN 36*   CREATININE 3.3*   MG 2.2       Pertinent Medications Reviewed: reviewed  Scheduled Meds:   apixaban  5 mg Oral BID    atorvastatin  40 mg Oral Daily    clopidogrel  75 mg Oral Daily    cyanocobalamin  1,000 mcg Subcutaneous Daily    epoetin vladimir-ebpx (RETACRIT) injection  10,000 Units Subcutaneous Daily    famotidine  20 mg Oral Daily    furosemide  20 mg Intravenous BID    insulin aspart U-100  4 Units Subcutaneous TIDWM    insulin detemir U-100  14 Units Subcutaneous QHS    iron sucrose (VENOFER) IVPB  100 mg Intravenous QAM    isosorbide mononitrate  30 mg Oral Daily    lidocaine (PF) 10 mg/ml (1%)  1 mL Other Once    lipase-protease-amylase 24,000-76,000-120,000 units  1 capsule Oral TID WM    And    lipase-protease-amylase 6,000-19,000-30,000 units  2 capsule Oral TID WM    metoclopramide HCl  10 mg Oral TID AC    metoprolol succinate  25 mg Oral Daily    midodrine  2.5 mg Oral TID WM    sucralfate  1 g Oral Q6H    tamsulosin  1 capsule Oral Daily "     Continuous Infusions:  PRN Meds:.sodium chloride, acetaminophen, albuterol-ipratropium, Dextrose 10% Bolus, glucagon (human recombinant), insulin aspart U-100, ondansetron, ramelteon, senna-docusate 8.6-50 mg, sodium chloride 0.9%, traMADol, traMADol      Estimated/Assessed Needs    Weight Used For Calorie Calculations: 120.1 kg (264 lb 12.4 oz)     Energy Calorie Requirements (kcal): 1949  Energy Need Method: Beaver-St Jeor     Protein Requirements: 96(.8 gm Pro/kg)  Weight Used For Protein Calculations: 120.4 kg (265 lb 6.9 oz)     Estimated Fluid Requirement Method: RDA Method  RDA Method (mL): 1949     CHO Requirement: 220 gm      Nutrition Prescription Ordered    Current Diet Order: Regular    Evaluation of Received Nutrient/Fluid Intake    Comments: LBM 7/9/19    % Intake of Estimated Energy Needs: 50 - 75 %  % Meal Intake: 50 - 75 %    Nutrition Risk    Level of Risk/Frequency of Follow-up: (1xweekly)     Assessment and Plan    Anasarca  Nutrition Problem  Unintended weight gain        Related to (etiology):   +Ascities with Paracenthesis  +Generalized body edema  Acute Kidney Injury        Signs and Symptoms (as evidenced by):   BMI > 40   DEC po intake-completed  Supplemental Nutrition via TPN-completed  Ed Lymphedema of Lower extrementies        Interventions:  Collaboration with other providers        Nutrition Diagnosis Status:        Improving       Monitor and Evaluation    Food and Nutrient Intake: energy intake  Food and Nutrient Adminstration: diet order  Anthropometric Measurements: weight, weight change, body mass index  Biochemical Data, Medical Tests and Procedures: electrolyte and renal panel, gastrointestinal profile, glucose/endocrine profile, inflammatory profile, lipid profile  Nutrition-Focused Physical Findings: overall appearance, extremities, muscles and bones, head and eyes, skin     Malnutrition Assessment  Malnutrition Type: chronic illness      Edema (Fluid Accumulation):  3-->moderate([place] Lower Leg Lymphedema; Ascities; )   Subcutaneous Fat Loss (Final Summary): well nourished  Muscle Loss Evaluation (Final Summary): well nourished  Fluid Accumulation Evaluation: moderate         Nutrition Follow-Up    RD Follow-up?: Yes

## 2019-07-11 NOTE — PLAN OF CARE
Problem: Physical Therapy Goal  Goal: Physical Therapy Goal  Goals to be met by: 2019    Patient will increase functional independence with mobility by performin. Sit to supine with Mod I  2. Sit to stand transfer with Mod I  3. Bed to chair transfer with Supervision using Rolling Walker  3. Gait  x50 feet with Supervision using Rolling Walker.   4. Lower extremity exercise program x12 reps per handout, with supervision  MET 19       Outcome: Ongoing (interventions implemented as appropriate)  Goal 4 met

## 2019-07-11 NOTE — PT/OT/SLP PROGRESS
Occupational Therapy      Patient Name:  Jian Arrieta   MRN:  [id number]    Patient not seen today secondary to Pt found up in chair eating lunch and visiting with a friend/family member.  Requested OT to return at later time. Will follow-up at later time.    AFIA Martínez  7/11/2019

## 2019-07-11 NOTE — PLAN OF CARE
Problem: Adult Inpatient Plan of Care  Goal: Plan of Care Review  Outcome: Ongoing (interventions implemented as appropriate)     07/11/19 0153   Plan of Care Review   Plan of Care Reviewed With patient   AAO,no c/o pain,IVF admin per MAR,foam dressings in place,safety and comfort maintained.

## 2019-07-11 NOTE — PLAN OF CARE
[place] and [facility] met with patient to discuss continued discharge planning. Pt sister Nena also called by pt while in the room. Nena was updated on pt status and discharge options. Pt and sister informed on SNF choices per insurance company. [place] and [facility] were informed  is last choice. Will continue to monitor pt progress and discharge needs. No other questions at this time.     Future Appointments   Date Time Provider Department Center   8/2/2019 11:20 AM Leon Barajas DO [facility] IM [place]   8/26/2019 11:00 AM Jaime Carney III, MD [facility] CARDIO [place]        07/11/19 1221   Discharge Reassessment   Assessment Type Discharge Planning Reassessment   Provided patient/caregiver education on the expected discharge date and the discharge plan Yes   Do you have any problems affording any of your prescribed medications? No   Discharge Plan A Skilled Nursing Facility   DME Needed Upon Discharge  none   Patient choice form signed by patient/caregiver N/A   Anticipated Discharge Disposition SNF   Can the patient answer the patient profile reliably? Yes, cognitively intact   How does the patient rate their overall health at the present time? Good   Describe the patient's ability to walk at the present time. Minor restrictions or changes   How often would a person be available to care for the patient? Whenever needed   Number of comorbid conditions (as recorded on the chart) Five or more

## 2019-07-11 NOTE — PLAN OF CARE
Problem: Adult Inpatient Plan of Care  Goal: Plan of Care Review  Outcome: Ongoing (interventions implemented as appropriate)  Pt AAo x 4. VSS. NAD. Cardiac and glucose monitored with supplemental insulin provided.  Continuous fluids and albumin discontinued this shift. Tolerating regular diet. Pt up to chair with assist this shift. Safety maintained. Will continue to monitor.

## 2019-07-12 LAB
ALBUMIN SERPL BCP-MCNC: 3.2 G/DL (ref 3.5–5.2)
ALP SERPL-CCNC: 145 U/L (ref 55–135)
ALT SERPL W/O P-5'-P-CCNC: 42 U/L (ref 10–44)
ANION GAP SERPL CALC-SCNC: 6 MMOL/L (ref 8–16)
AST SERPL-CCNC: 32 U/L (ref 10–40)
BASOPHILS # BLD AUTO: 0.05 K/UL (ref 0–0.2)
BASOPHILS NFR BLD: 1.1 % (ref 0–1.9)
BILIRUB SERPL-MCNC: 0.4 MG/DL (ref 0.1–1)
BUN SERPL-MCNC: 35 MG/DL (ref 8–23)
CALCIUM SERPL-MCNC: 8.3 MG/DL (ref 8.7–10.5)
CHLORIDE SERPL-SCNC: 111 MMOL/L (ref 95–110)
CO2 SERPL-SCNC: 23 MMOL/L (ref 23–29)
CREAT SERPL-MCNC: 3.2 MG/DL (ref 0.5–1.4)
DIFFERENTIAL METHOD: ABNORMAL
EOSINOPHIL # BLD AUTO: 0.1 K/UL (ref 0–0.5)
EOSINOPHIL NFR BLD: 2.5 % (ref 0–8)
ERYTHROCYTE [DISTWIDTH] IN BLOOD BY AUTOMATED COUNT: 19.9 % (ref 11.5–14.5)
EST. GFR  (AFRICAN AMERICAN): 22 ML/MIN/1.73 M^2
EST. GFR  (NON AFRICAN AMERICAN): 19 ML/MIN/1.73 M^2
GLUCOSE SERPL-MCNC: 98 MG/DL (ref 70–110)
HCT VFR BLD AUTO: 28.2 % (ref 40–54)
HGB BLD-MCNC: 8.5 G/DL (ref 14–18)
INR PPP: 1.1 (ref 0.8–1.2)
LYMPHOCYTES # BLD AUTO: 1.3 K/UL (ref 1–4.8)
LYMPHOCYTES NFR BLD: 28.8 % (ref 18–48)
MAGNESIUM SERPL-MCNC: 2 MG/DL (ref 1.6–2.6)
MCH RBC QN AUTO: 29.1 PG (ref 27–31)
MCHC RBC AUTO-ENTMCNC: 30.1 G/DL (ref 32–36)
MCV RBC AUTO: 97 FL (ref 82–98)
MONOCYTES # BLD AUTO: 0.5 K/UL (ref 0.3–1)
MONOCYTES NFR BLD: 12.3 % (ref 4–15)
NEUTROPHILS # BLD AUTO: 2.4 K/UL (ref 1.8–7.7)
NEUTROPHILS NFR BLD: 55.3 % (ref 38–73)
PHOSPHATE SERPL-MCNC: 3.2 MG/DL (ref 2.7–4.5)
PLATELET # BLD AUTO: 205 K/UL (ref 150–350)
PMV BLD AUTO: 9.6 FL (ref 9.2–12.9)
POCT GLUCOSE: 101 MG/DL (ref 70–110)
POCT GLUCOSE: 105 MG/DL (ref 70–110)
POCT GLUCOSE: 222 MG/DL (ref 70–110)
POCT GLUCOSE: 286 MG/DL (ref 70–110)
POCT GLUCOSE: 71 MG/DL (ref 70–110)
POTASSIUM SERPL-SCNC: 4 MMOL/L (ref 3.5–5.1)
PROT SERPL-MCNC: 5 G/DL (ref 6–8.4)
PROTHROMBIN TIME: 11.5 SEC (ref 9–12.5)
RBC # BLD AUTO: 2.92 M/UL (ref 4.6–6.2)
SODIUM SERPL-SCNC: 140 MMOL/L (ref 136–145)
WBC # BLD AUTO: 4.38 K/UL (ref 3.9–12.7)

## 2019-07-12 PROCEDURE — 84100 ASSAY OF PHOSPHORUS: CPT

## 2019-07-12 PROCEDURE — 63600175 PHARM REV CODE 636 W HCPCS: Performed by: STUDENT IN AN ORGANIZED HEALTH CARE EDUCATION/TRAINING PROGRAM

## 2019-07-12 PROCEDURE — 25000003 PHARM REV CODE 250: Performed by: STUDENT IN AN ORGANIZED HEALTH CARE EDUCATION/TRAINING PROGRAM

## 2019-07-12 PROCEDURE — 97110 THERAPEUTIC EXERCISES: CPT

## 2019-07-12 PROCEDURE — 85025 COMPLETE CBC W/AUTO DIFF WBC: CPT

## 2019-07-12 PROCEDURE — 97116 GAIT TRAINING THERAPY: CPT

## 2019-07-12 PROCEDURE — 97530 THERAPEUTIC ACTIVITIES: CPT

## 2019-07-12 PROCEDURE — 11000001 HC ACUTE MED/SURG PRIVATE ROOM

## 2019-07-12 PROCEDURE — 85610 PROTHROMBIN TIME: CPT

## 2019-07-12 PROCEDURE — 83735 ASSAY OF MAGNESIUM: CPT

## 2019-07-12 PROCEDURE — 25000003 PHARM REV CODE 250: Performed by: SURGERY

## 2019-07-12 PROCEDURE — 63600175 PHARM REV CODE 636 W HCPCS: Performed by: SURGERY

## 2019-07-12 PROCEDURE — 80053 COMPREHEN METABOLIC PANEL: CPT

## 2019-07-12 PROCEDURE — 94761 N-INVAS EAR/PLS OXIMETRY MLT: CPT

## 2019-07-12 RX ADMIN — INSULIN ASPART 1 UNITS: 100 INJECTION, SOLUTION INTRAVENOUS; SUBCUTANEOUS at 10:07

## 2019-07-12 RX ADMIN — APIXABAN 5 MG: 5 TABLET, FILM COATED ORAL at 09:07

## 2019-07-12 RX ADMIN — METOCLOPRAMIDE 10 MG: 10 TABLET ORAL at 12:07

## 2019-07-12 RX ADMIN — INSULIN DETEMIR 14 UNITS: 100 INJECTION, SOLUTION SUBCUTANEOUS at 10:07

## 2019-07-12 RX ADMIN — SUCRALFATE 1 G: 1 SUSPENSION ORAL at 12:07

## 2019-07-12 RX ADMIN — RAMELTEON 8 MG: 8 TABLET, FILM COATED ORAL at 12:07

## 2019-07-12 RX ADMIN — PANCRELIPASE 2 CAPSULE: 30000; 6000; 19000 CAPSULE, DELAYED RELEASE PELLETS ORAL at 05:07

## 2019-07-12 RX ADMIN — FUROSEMIDE 20 MG: 10 INJECTION, SOLUTION INTRAMUSCULAR; INTRAVENOUS at 05:07

## 2019-07-12 RX ADMIN — IRON SUCROSE 100 MG: 20 INJECTION, SOLUTION INTRAVENOUS at 06:07

## 2019-07-12 RX ADMIN — INSULIN ASPART 4 UNITS: 100 INJECTION, SOLUTION INTRAVENOUS; SUBCUTANEOUS at 08:07

## 2019-07-12 RX ADMIN — METOPROLOL SUCCINATE 25 MG: 25 TABLET, FILM COATED, EXTENDED RELEASE ORAL at 09:07

## 2019-07-12 RX ADMIN — METOCLOPRAMIDE 10 MG: 10 TABLET ORAL at 05:07

## 2019-07-12 RX ADMIN — PANCRELIPASE 2 CAPSULE: 30000; 6000; 19000 CAPSULE, DELAYED RELEASE PELLETS ORAL at 08:07

## 2019-07-12 RX ADMIN — SUCRALFATE 1 G: 1 SUSPENSION ORAL at 05:07

## 2019-07-12 RX ADMIN — FUROSEMIDE 20 MG: 10 INJECTION, SOLUTION INTRAMUSCULAR; INTRAVENOUS at 09:07

## 2019-07-12 RX ADMIN — METOCLOPRAMIDE 10 MG: 10 TABLET ORAL at 06:07

## 2019-07-12 RX ADMIN — MIDODRINE HYDROCHLORIDE 2.5 MG: 2.5 TABLET ORAL at 08:07

## 2019-07-12 RX ADMIN — MIDODRINE HYDROCHLORIDE 2.5 MG: 2.5 TABLET ORAL at 05:07

## 2019-07-12 RX ADMIN — PANCRELIPASE 1 CAPSULE: 24000; 76000; 120000 CAPSULE, DELAYED RELEASE PELLETS ORAL at 08:07

## 2019-07-12 RX ADMIN — PANCRELIPASE 1 CAPSULE: 24000; 76000; 120000 CAPSULE, DELAYED RELEASE PELLETS ORAL at 05:07

## 2019-07-12 RX ADMIN — MIDODRINE HYDROCHLORIDE 2.5 MG: 2.5 TABLET ORAL at 12:07

## 2019-07-12 RX ADMIN — PANCRELIPASE 1 CAPSULE: 24000; 76000; 120000 CAPSULE, DELAYED RELEASE PELLETS ORAL at 12:07

## 2019-07-12 RX ADMIN — ISOSORBIDE MONONITRATE 30 MG: 30 TABLET, EXTENDED RELEASE ORAL at 09:07

## 2019-07-12 RX ADMIN — FAMOTIDINE 20 MG: 20 TABLET, FILM COATED ORAL at 09:07

## 2019-07-12 RX ADMIN — PANCRELIPASE 2 CAPSULE: 30000; 6000; 19000 CAPSULE, DELAYED RELEASE PELLETS ORAL at 12:07

## 2019-07-12 RX ADMIN — APIXABAN 5 MG: 5 TABLET, FILM COATED ORAL at 10:07

## 2019-07-12 RX ADMIN — CLOPIDOGREL BISULFATE 75 MG: 75 TABLET ORAL at 09:07

## 2019-07-12 RX ADMIN — CYANOCOBALAMIN 1000 MCG: 1000 INJECTION, SOLUTION INTRAMUSCULAR at 09:07

## 2019-07-12 RX ADMIN — TAMSULOSIN HYDROCHLORIDE 0.4 MG: 0.4 CAPSULE ORAL at 09:07

## 2019-07-12 RX ADMIN — ATORVASTATIN CALCIUM 40 MG: 40 TABLET, FILM COATED ORAL at 09:07

## 2019-07-12 RX ADMIN — EPOETIN ALFA-EPBX 10000 UNITS: 10000 INJECTION, SOLUTION INTRAVENOUS; SUBCUTANEOUS at 09:07

## 2019-07-12 RX ADMIN — SUCRALFATE 1 G: 1 SUSPENSION ORAL at 06:07

## 2019-07-12 NOTE — PROGRESS NOTES
Wound care consult follow-up    Dr. LANCE Barajas notified of assessment of venous stasis wounds, new orders given. All three wounds have healed, no signs of cellulitis, no warmth only hemosiderin staining to BLE.  Both leg with edema, wrapped with cast padding and ACE bandage.

## 2019-07-12 NOTE — PT/OT/SLP PROGRESS
Occupational Therapy   Treatment    Name: Jian Arrieta  MRN: [id number]  Admitting Diagnosis:  Portal hypertension  4 Days Post-Op    Recommendations:     Discharge Recommendations: nursing facility, skilled  Discharge Equipment Recommendations:  (Defer to SNF)  Barriers to discharge:  Inaccessible home environment, Decreased caregiver support    Assessment:   Patient making good progress towards goals. Will benefit from SNF upon D/C. Patient will benefit from continued skilled OT to address deficits and improve performance in functional ADL tasks. Cont OT.    Jian Arrieta is a 63 y.o. male with a medical diagnosis of Portal hypertension. Performance deficits affecting function are weakness, impaired endurance, impaired self care skills, impaired functional mobilty, gait instability, impaired balance, decreased upper extremity function, decreased lower extremity function, decreased safety awareness, impaired skin, edema.     Rehab Prognosis:  Good and Fair; patient would benefit from acute skilled OT services to address these deficits and reach maximum level of function.       Plan:     Patient to be seen 5 x/week to address the above listed problems via self-care/home management, therapeutic activities, therapeutic exercises  · Plan of Care Expires: 08/04/19  · Plan of Care Reviewed with: patient    Subjective     Pain/Comfort:  · Pain Rating 1: 0/10  · Pain Rating Post-Intervention 1: 0/10    Objective:     Communicated with: nurseMaryan prior to session.  Patient found up in chair with bed alarm, telemetry upon OT entry to room.    General Precautions: Standard, fall, hearing impaired   Orthopedic Precautions:N/A   Braces: N/A     [facility]    Treatment & Education:  Patient instructed in and completed BUE AROM therex, 2 x 20 reps, using med resistance theraband: bicep curls, sh flexion, horizontal sh abd/add, punches. Patient also use 5# dowel for chest press and shoulder press, 2 x 20 reps.  Patient still requesting higher resistance activities, but acute therapy does not have such products. Patient encouraged to increase repetitions and also double up theraband to increase resistance.     Patient left up in chair with all lines intact, call button in reach and nurse notifiedEducation:      GOALS:   Multidisciplinary Problems     Occupational Therapy Goals        Problem: Occupational Therapy Goal    Goal Priority Disciplines Outcome Interventions   Occupational Therapy Goal     OT, PT/OT Ongoing (interventions implemented as appropriate)    Description:  Goals to be met by: 7/29/2019    Patient will increase functional independence with ADLs by performing:    UE Dressing with Modified [place].  LE Dressing with Modified [place].  Toileting from toilet with Modified [place] for hygiene and clothing management.   Supine to sit with Modified [place].  Step transfer with Modified [place]  Toilet transfer to toilet with Modified [place].  Increased functional strength to WFL for ADLS.  Upper extremity exercise program 10 reps per handout, with supervision.                      Time Tracking:     OT Date of Treatment: 07/12/19  OT Start Time: 1105  OT Stop Time: 1132  OT Total Time (min): 27 min    Billable Minutes:Therapeutic Exercise 27    AFIA Melendez  7/12/2019

## 2019-07-12 NOTE — PT/OT/SLP PROGRESS
Physical Therapy Treatment    Patient Name:  Jian Arrieta   MRN:  [id number]    Recommendations:     Discharge Recommendations:  nursing facility, skilled   Discharge Equipment Recommendations: (Defer to SNF)   Barriers to discharge: poor saefty awareness/impaired activity tolerance    Assessment:     Jian Arrieta is a 63 y.o. male admitted with a medical diagnosis of Portal hypertension.  He presents with the following impairments/functional limitations:  weakness, impaired self care skills, impaired balance, decreased coordination, decreased safety awareness, impaired endurance, impaired functional mobilty, impaired sensation, gait instability, impaired cognition, impaired coordination, decreased lower extremity function, impaired skin, decreased ROM. PT 5th visit completed and goals addressed. Continue PT POC as pt is making progress towards goals. Pt continues to be a high falls risk and demonstrates poor safety awareness c/ functional mobility.     Rehab Prognosis: Good; patient would benefit from acute skilled PT services to address these deficits and reach maximum level of function.    Recent Surgery: Procedure(s) (LRB):  ESOPHAGOGASTRODUODENOSCOPY (EGD) (N/A) 4 Days Post-Op    Plan:     During this hospitalization, patient to be seen 6 x/week to address the identified rehab impairments via gait training, therapeutic activities, therapeutic exercises, neuromuscular re-education and progress toward the following goals:    · Plan of Care Expires:  08/07/19    Subjective     Chief Complaint: NA  Patient/Family Comments/goals: Agreed to PT POC  Pain/Comfort:  · Pain Rating 1: 0/10  · Pain Rating Post-Intervention 1: 0/10      Objective:     Communicated with RNrashaad prior to session.  Patient found c/ LE elevated with telemetry, bed alarm upon PT entry to room.     General Precautions: Standard, fall, hearing impaired   Orthopedic Precautions:N/A   Braces: N/A     Functional Mobility:  · Bed Mobility:    "  · Rolling Left:  supervision  · Scooting: supervision  · Supine to Sit: stand by assistance  · Transfers:     · Sit to Stand:  stand by assistance with rolling walker  · Bed to Chair: contact guard assistance with  rolling walker  using  Stand Pivot  · Gait: 60ft x 2 c/ RW; CGA for safety as pt has poor safety awareness; pt demo wide CLEO: mod <> max VC to focus on task for safety; proper approximation of RW;and safe navigation on turns  · Balance: dynamic gait: fair- pt however, has poor safety sawreness and makes sudden unsafe movements (pt performed a single leg stance suddenly c/ forward lean on RW which was far from him; when asked what he was doing, he replied " I want to assess you".      AM-PAC 6 CLICK MOBILITY  Turning over in bed (including adjusting bedclothes, sheets and blankets)?: 4  Sitting down on and standing up from a chair with arms (e.g., wheelchair, bedside commode, etc.): 3  Moving from lying on back to sitting on the side of the bed?: 3  Moving to and from a bed to a chair (including a wheelchair)?: 3  Need to walk in hospital room?: 3  Climbing 3-5 steps with a railing?: 2  Basic Mobility Total Score: 18       Therapeutic Activities and Exercises:   Pt instructed in gait training as detailed above; post gait training, pt requested to sit OOB; was transferred into chair c/ chair alarm and handed over to RN care.    Patient left up in chair with call button in reach, chair alarm on and RN notified..    GOALS:   Multidisciplinary Problems     Physical Therapy Goals        Problem: Physical Therapy Goal    Goal Priority Disciplines Outcome Goal Variances Interventions   Physical Therapy Goal     PT, PT/OT Ongoing (interventions implemented as appropriate)     Description:  Goals to be met by: 2019    Patient will increase functional independence with mobility by performin. Sit to supine with Mod I  2. Sit to stand transfer with Mod I  3. Bed to chair transfer with Supervision using " Rolling Walker  3. Gait  x150 feet with Supervision using Rolling Walker.   4. Lower extremity exercise program x12 reps per handout, with supervision  MET 7/11/19                          Time Tracking:     PT Received On: 07/12/19  PT Start Time: 0908     PT Stop Time: 0936  PT Total Time (min): 28 min     Billable Minutes: Gait Training 15 and Therapeutic Activity 13    Treatment Type: Treatment  PT/PTA: PT     PTA Visit Number: 4     Leticia Ovalle, PT  07/12/2019

## 2019-07-12 NOTE — PLAN OF CARE
Problem: Occupational Therapy Goal  Goal: Occupational Therapy Goal  Goals to be met by: 7/29/2019    Patient will increase functional independence with ADLs by performing:    UE Dressing with Modified [place].  LE Dressing with Modified [place].  Toileting from toilet with Modified [place] for hygiene and clothing management.   Supine to sit with Modified [place].  Step transfer with Modified [place]  Toilet transfer to toilet with Modified [place].  Increased functional strength to WFL for ADLS.  Upper extremity exercise program 10 reps per handout, with supervision.     Outcome: Ongoing (interventions implemented as appropriate)  Patient making good progress towards goals. Will benefit from SNF upon D/C. Patient will benefit from continued skilled OT to address deficits and improve performance in functional ADL tasks. Cont OT.

## 2019-07-12 NOTE — PLAN OF CARE
Problem: Physical Therapy Goal  Goal: Physical Therapy Goal  Goals to be met by: 2019    Patient will increase functional independence with mobility by performin. Sit to supine with Mod I  2. Sit to stand transfer with Mod I  3. Bed to chair transfer with Supervision using Rolling Walker  3. Gait  x150 feet with Supervision using Rolling Walker.   4. Lower extremity exercise program x12 reps per handout, with supervision  MET 19        Outcome: Ongoing (interventions implemented as appropriate)  PT 5th visit completed and goals addressed. Continue PT POC as pt is making progress towards goals. Pt continues to be a high falls risk and demonstrates poor safety awareness c/ functional mobility.

## 2019-07-12 NOTE — PROGRESS NOTES
NEUROENDOCRINE SURGERY PROGRESS NOTE    63yoM with multiple comorbidities with extrahepatic PV occlusion plus mild cirrhosis with CRI and mild CHF contributing to recalcitrant ascites plus non-compliance with sodium/fluids as well as fatigue due to hypokalemia and fluid overload, now s/p paracentesis, transhepatic portal venogram, pressure measurements, and venoplasty with IR 6/28/19. Complicated by ELIEZER.     INTERVAL HISTORY  No acute events  Afebrile VSS  No CP/SOB  Denies n/v, tolerating regular diet  Ambulating with PT, up in chair.     Temp:  [97.2 °F (36.2 °C)-98.7 °F (37.1 °C)] 97.9 °F (36.6 °C)  Pulse:  [] 84  Resp:  [14-19] 18  SpO2:  [95 %-100 %] 95 %  BP: ()/(53-59) 104/56    I/O last 3 completed shifts:  In: 1971 [P.O.:1400; I.V.:371; IV Piggyback:200]  Out: 1900 [Urine:1900]    EXAM  GEN: A&Ox3, NAD  CV: RRR  RESP: Non-labored breathing  ABD: Obese, soft, NT/ND.   EXT: BLE with worsening edema today still some remaining erythema with mepilex over open wounds, no purulence   : George removed    LABS  Recent Labs     07/10/19  0530 07/11/19  0445 07/12/19  0500   WBC 4.35 4.62 4.38   HGB 8.2* 8.4* 8.5*   HCT 26.9* 27.5* 28.2*    176 205     CMP  Recent Labs     07/10/19  0530 07/11/19  0445 07/12/19  0500    139 140   K 4.0 4.0 4.0    110 111*   CO2 23 22* 23   * 90 98   BUN 39* 36* 35*   CREATININE 3.4* 3.3* 3.2*   CALCIUM 8.2* 8.2* 8.3*   PROT 4.9* 4.9* 5.0*   ALBUMIN 3.2* 3.3* 3.2*   BILITOT 0.5 0.4 0.4   ALKPHOS 122 121 145*   AST 47* 39 32   ALT 46* 41 42   ANIONGAP 5* 7* 6*   ESTGFRAFRICA 21* 22* 22*   EGFRNONAA 18* 19* 19*     Recent Labs     07/10/19  0530 07/11/19  0445 07/12/19  0500   MG 2.1 2.2 2.0   PHOS 3.5 3.2 3.2     UA unremarkable    IMAGING  No new imaging    ASSESSMENT/PLAN  63yoM with multiple comorbidities with extrahepatic PV occlusion plus mild cirrhosis with CRI and mild CHF contributing to recalcitrant ascites plus non-compliance with  sodium/fluids as well as fatigue due to hypokalemia and fluid overload, now s/p paracentesis, transhepatic portal venogram, pressure measurements, and venoplasty with IR 6/28/19. Complicated by ELIEZER     NEURO: continue pain control prn  RESP: stable, continue IS, duonebs prn  CV: Chest pain resolved, hypotension improved  RENAL: UOP stabilizing, 1660 over 24 hours. Replacing q2h with 1/2NS.Stopped albumin and fluids.   FEN/GI: Low potassium diabetic diet, Discontinue clinimix. EGD with no varices. Follow up biopsy.    HEME: stable s/p 2uprbc. Restarted home eliquis.  ID: Afebrile no leukocytosis. Completed clindamycin Monitor fevers.   ENDOCRINE: CBG remains, continue pm levemir at 14 u (home dose) and 4u novolog with meals. May normalize off clinimix, will monitor  MSK: Ambulate with PT/OT, elevate legs, wound care to open wounds   PPX: SQH, Pepcid  DISPO: pending placement at [facility].     Liliane Churchill MD  [employer] General Surgery PGY-2

## 2019-07-12 NOTE — PLAN OF CARE
placed call to Erika at [facility]- no beds today. Erika reported no beds until maybe Monday or Tuesday of next week.    Per  Isaiah at [facility]- she did not receive referral because her fax was down for 2 days.  emailed SNF referral to her at [email or web address].    Carlos at [facility] stated the patient was declined (cannot meet the patient's needs). Additionally their facility is not accepting new patients at this time.    Carlos informed  if the patient is still seeking placement on Monday to resend a referral on Monday.

## 2019-07-12 NOTE — PLAN OF CARE
Problem: Adult Inpatient Plan of Care  Goal: Plan of Care Review  Outcome: Ongoing (interventions implemented as appropriate)     07/12/19 0024   Plan of Care Review   Plan of Care Reviewed With patient   AAO,tele monitoring in progress,no c/o pain,escorted  to balcony in wheelchair with wife after request to get fresh air,safety and comfort maintained.

## 2019-07-13 LAB
ALBUMIN SERPL BCP-MCNC: 3.3 G/DL (ref 3.5–5.2)
ALP SERPL-CCNC: 161 U/L (ref 55–135)
ALT SERPL W/O P-5'-P-CCNC: 46 U/L (ref 10–44)
ANION GAP SERPL CALC-SCNC: 7 MMOL/L (ref 8–16)
AST SERPL-CCNC: 38 U/L (ref 10–40)
BASOPHILS # BLD AUTO: 0.06 K/UL (ref 0–0.2)
BASOPHILS NFR BLD: 1.4 % (ref 0–1.9)
BILIRUB SERPL-MCNC: 0.4 MG/DL (ref 0.1–1)
BUN SERPL-MCNC: 35 MG/DL (ref 8–23)
CALCIUM SERPL-MCNC: 8.3 MG/DL (ref 8.7–10.5)
CHLORIDE SERPL-SCNC: 111 MMOL/L (ref 95–110)
CO2 SERPL-SCNC: 22 MMOL/L (ref 23–29)
CREAT SERPL-MCNC: 2.8 MG/DL (ref 0.5–1.4)
DIFFERENTIAL METHOD: ABNORMAL
EOSINOPHIL # BLD AUTO: 0.1 K/UL (ref 0–0.5)
EOSINOPHIL NFR BLD: 2.7 % (ref 0–8)
ERYTHROCYTE [DISTWIDTH] IN BLOOD BY AUTOMATED COUNT: 20.3 % (ref 11.5–14.5)
EST. GFR  (AFRICAN AMERICAN): 26 ML/MIN/1.73 M^2
EST. GFR  (NON AFRICAN AMERICAN): 23 ML/MIN/1.73 M^2
GLUCOSE SERPL-MCNC: 54 MG/DL (ref 70–110)
HCT VFR BLD AUTO: 30.5 % (ref 40–54)
HGB BLD-MCNC: 9.2 G/DL (ref 14–18)
INR PPP: 1.2 (ref 0.8–1.2)
LYMPHOCYTES # BLD AUTO: 1.8 K/UL (ref 1–4.8)
LYMPHOCYTES NFR BLD: 40.1 % (ref 18–48)
MAGNESIUM SERPL-MCNC: 1.9 MG/DL (ref 1.6–2.6)
MCH RBC QN AUTO: 29.3 PG (ref 27–31)
MCHC RBC AUTO-ENTMCNC: 30.2 G/DL (ref 32–36)
MCV RBC AUTO: 97 FL (ref 82–98)
MONOCYTES # BLD AUTO: 0.5 K/UL (ref 0.3–1)
MONOCYTES NFR BLD: 10.1 % (ref 4–15)
NEUTROPHILS # BLD AUTO: 2 K/UL (ref 1.8–7.7)
NEUTROPHILS NFR BLD: 45.7 % (ref 38–73)
PHOSPHATE SERPL-MCNC: 3.2 MG/DL (ref 2.7–4.5)
PLATELET # BLD AUTO: 211 K/UL (ref 150–350)
PMV BLD AUTO: 8.9 FL (ref 9.2–12.9)
POCT GLUCOSE: 112 MG/DL (ref 70–110)
POCT GLUCOSE: 162 MG/DL (ref 70–110)
POCT GLUCOSE: 200 MG/DL (ref 70–110)
POCT GLUCOSE: 216 MG/DL (ref 70–110)
POCT GLUCOSE: 61 MG/DL (ref 70–110)
POCT GLUCOSE: 70 MG/DL (ref 70–110)
POTASSIUM SERPL-SCNC: 3.6 MMOL/L (ref 3.5–5.1)
PROT SERPL-MCNC: 5.2 G/DL (ref 6–8.4)
PROTHROMBIN TIME: 12 SEC (ref 9–12.5)
RBC # BLD AUTO: 3.14 M/UL (ref 4.6–6.2)
SODIUM SERPL-SCNC: 140 MMOL/L (ref 136–145)
WBC # BLD AUTO: 4.44 K/UL (ref 3.9–12.7)

## 2019-07-13 PROCEDURE — S5571 INSULIN DISPOS PEN 3 ML: HCPCS | Performed by: STUDENT IN AN ORGANIZED HEALTH CARE EDUCATION/TRAINING PROGRAM

## 2019-07-13 PROCEDURE — 83735 ASSAY OF MAGNESIUM: CPT

## 2019-07-13 PROCEDURE — 25000003 PHARM REV CODE 250: Performed by: STUDENT IN AN ORGANIZED HEALTH CARE EDUCATION/TRAINING PROGRAM

## 2019-07-13 PROCEDURE — 63600175 PHARM REV CODE 636 W HCPCS: Performed by: STUDENT IN AN ORGANIZED HEALTH CARE EDUCATION/TRAINING PROGRAM

## 2019-07-13 PROCEDURE — 84100 ASSAY OF PHOSPHORUS: CPT

## 2019-07-13 PROCEDURE — 63600175 PHARM REV CODE 636 W HCPCS: Performed by: SURGERY

## 2019-07-13 PROCEDURE — 85025 COMPLETE CBC W/AUTO DIFF WBC: CPT

## 2019-07-13 PROCEDURE — 94761 N-INVAS EAR/PLS OXIMETRY MLT: CPT

## 2019-07-13 PROCEDURE — 85610 PROTHROMBIN TIME: CPT

## 2019-07-13 PROCEDURE — 25000003 PHARM REV CODE 250: Performed by: SURGERY

## 2019-07-13 PROCEDURE — 11000001 HC ACUTE MED/SURG PRIVATE ROOM

## 2019-07-13 PROCEDURE — 80053 COMPREHEN METABOLIC PANEL: CPT

## 2019-07-13 RX ORDER — POTASSIUM CHLORIDE 20 MEQ/1
40 TABLET, EXTENDED RELEASE ORAL ONCE
Status: COMPLETED | OUTPATIENT
Start: 2019-07-13 | End: 2019-07-13

## 2019-07-13 RX ORDER — SPIRONOLACTONE 25 MG/1
25 TABLET ORAL 2 TIMES DAILY
Status: DISCONTINUED | OUTPATIENT
Start: 2019-07-13 | End: 2019-07-15 | Stop reason: HOSPADM

## 2019-07-13 RX ORDER — FUROSEMIDE 20 MG/1
20 TABLET ORAL 2 TIMES DAILY
Status: DISCONTINUED | OUTPATIENT
Start: 2019-07-13 | End: 2019-07-15 | Stop reason: HOSPADM

## 2019-07-13 RX ADMIN — PANCRELIPASE 2 CAPSULE: 30000; 6000; 19000 CAPSULE, DELAYED RELEASE PELLETS ORAL at 08:07

## 2019-07-13 RX ADMIN — TAMSULOSIN HYDROCHLORIDE 0.4 MG: 0.4 CAPSULE ORAL at 09:07

## 2019-07-13 RX ADMIN — CYANOCOBALAMIN 1000 MCG: 1000 INJECTION, SOLUTION INTRAMUSCULAR at 09:07

## 2019-07-13 RX ADMIN — PANCRELIPASE 1 CAPSULE: 24000; 76000; 120000 CAPSULE, DELAYED RELEASE PELLETS ORAL at 11:07

## 2019-07-13 RX ADMIN — SUCRALFATE 1 G: 1 SUSPENSION ORAL at 11:07

## 2019-07-13 RX ADMIN — PANCRELIPASE 1 CAPSULE: 24000; 76000; 120000 CAPSULE, DELAYED RELEASE PELLETS ORAL at 04:07

## 2019-07-13 RX ADMIN — IRON SUCROSE 100 MG: 20 INJECTION, SOLUTION INTRAVENOUS at 06:07

## 2019-07-13 RX ADMIN — METOPROLOL SUCCINATE 25 MG: 25 TABLET, FILM COATED, EXTENDED RELEASE ORAL at 09:07

## 2019-07-13 RX ADMIN — PANCRELIPASE 2 CAPSULE: 30000; 6000; 19000 CAPSULE, DELAYED RELEASE PELLETS ORAL at 11:07

## 2019-07-13 RX ADMIN — METOCLOPRAMIDE 10 MG: 10 TABLET ORAL at 04:07

## 2019-07-13 RX ADMIN — FAMOTIDINE 20 MG: 20 TABLET, FILM COATED ORAL at 09:07

## 2019-07-13 RX ADMIN — PANCRELIPASE 2 CAPSULE: 30000; 6000; 19000 CAPSULE, DELAYED RELEASE PELLETS ORAL at 04:07

## 2019-07-13 RX ADMIN — FUROSEMIDE 20 MG: 20 TABLET ORAL at 05:07

## 2019-07-13 RX ADMIN — FUROSEMIDE 20 MG: 10 INJECTION, SOLUTION INTRAMUSCULAR; INTRAVENOUS at 09:07

## 2019-07-13 RX ADMIN — RAMELTEON 8 MG: 8 TABLET, FILM COATED ORAL at 11:07

## 2019-07-13 RX ADMIN — SUCRALFATE 1 G: 1 SUSPENSION ORAL at 06:07

## 2019-07-13 RX ADMIN — METOCLOPRAMIDE 10 MG: 10 TABLET ORAL at 11:07

## 2019-07-13 RX ADMIN — MIDODRINE HYDROCHLORIDE 2.5 MG: 2.5 TABLET ORAL at 08:07

## 2019-07-13 RX ADMIN — SPIRONOLACTONE 25 MG: 25 TABLET, FILM COATED ORAL at 09:07

## 2019-07-13 RX ADMIN — EPOETIN ALFA-EPBX 10000 UNITS: 10000 INJECTION, SOLUTION INTRAVENOUS; SUBCUTANEOUS at 11:07

## 2019-07-13 RX ADMIN — INSULIN ASPART 4 UNITS: 100 INJECTION, SOLUTION INTRAVENOUS; SUBCUTANEOUS at 04:07

## 2019-07-13 RX ADMIN — INSULIN DETEMIR 12 UNITS: 100 INJECTION, SOLUTION SUBCUTANEOUS at 09:07

## 2019-07-13 RX ADMIN — SUCRALFATE 1 G: 1 SUSPENSION ORAL at 01:07

## 2019-07-13 RX ADMIN — ATORVASTATIN CALCIUM 40 MG: 40 TABLET, FILM COATED ORAL at 09:07

## 2019-07-13 RX ADMIN — PANCRELIPASE 1 CAPSULE: 24000; 76000; 120000 CAPSULE, DELAYED RELEASE PELLETS ORAL at 08:07

## 2019-07-13 RX ADMIN — METOCLOPRAMIDE 10 MG: 10 TABLET ORAL at 06:07

## 2019-07-13 RX ADMIN — POTASSIUM CHLORIDE 40 MEQ: 20 TABLET, EXTENDED RELEASE ORAL at 04:07

## 2019-07-13 RX ADMIN — CLOPIDOGREL BISULFATE 75 MG: 75 TABLET ORAL at 09:07

## 2019-07-13 RX ADMIN — ISOSORBIDE MONONITRATE 30 MG: 30 TABLET, EXTENDED RELEASE ORAL at 09:07

## 2019-07-13 RX ADMIN — APIXABAN 5 MG: 5 TABLET, FILM COATED ORAL at 09:07

## 2019-07-13 RX ADMIN — SUCRALFATE 1 G: 1 SUSPENSION ORAL at 05:07

## 2019-07-13 RX ADMIN — MIDODRINE HYDROCHLORIDE 2.5 MG: 2.5 TABLET ORAL at 04:07

## 2019-07-13 NOTE — PLAN OF CARE
"VN note: VN cued into patient's room for introduction. VN informed patient that VN would be working closely along side bedside nurse, PCT, and the rest of care team and making rounds throughout the shift. Fall risk education provided. Patient "annoyed" he has to have MONICA camera in room. VN explained to patient as well regarding fall precautions. Charge nurse Julianna also notified. Allowed time for questions. VN will continue to be available to patient and intervene prn.       07/13/19 4381   Type of Frequent Check   Type Patient Rounds;Other (see comments)  (VN Rounds)   Safety/Activity   Patient Rounds bed in low position;visualized patient   Safety Promotion/Fall Prevention side rails raised x 2;in recliner, wheels locked;family to remain at bedside   Activity Management up in chair   Positioning   Body Position supine;positioned/repositioned independently   Head of Bed (HOB) HOB at 60-90 degrees   Assessments (Pre/Post)   Level of Consciousness (AVPU) alert     "

## 2019-07-13 NOTE — PLAN OF CARE
Cued into patient's room.  Permission received per patient to turn camera to view patient.  Introduced as VN for night shift that will be working with floor nurse and nursing assistant.  Educated patient on VN's role in patient care. Plan of care reviewed with patient. Education per flowsheet.  Opportunity given for questions and questions answered.  No complaints.  AVASYS at bedside.  Instructed to call for assistance.  Will cont to monitor.

## 2019-07-13 NOTE — PLAN OF CARE
Problem: Adult Inpatient Plan of Care  Goal: Plan of Care Review  Outcome: Ongoing (interventions implemented as appropriate)  Patient AAOx4, VSS, Bloodglucose monitored throughout shift, insulin given PRN. Patient tolerating activity, up in chair with telesitter in room. Patient refusing telesitter in room, the risks and benefits have been reviewed with patient. Patient free from falls. Patient denies any pain. Patient  Tolerating diet, no nausea or vomiting. Patient in NAD. Safety maintained, will continue to monitor.    Problem: Diabetes Comorbidity  Goal: Blood Glucose Level Within Desired Range  Outcome: Ongoing (interventions implemented as appropriate)       Problem: Infection  Goal: Infection Symptom Resolution  Outcome: Ongoing (interventions implemented as appropriate)       Problem: Fall Injury Risk  Goal: Absence of Fall and Fall-Related Injury  Outcome: Ongoing (interventions implemented as appropriate)       Problem: Skin Injury Risk Increased  Goal: Skin Health and Integrity  Outcome: Ongoing (interventions implemented as appropriate)

## 2019-07-13 NOTE — PLAN OF CARE
Problem: Adult Inpatient Plan of Care  Goal: Plan of Care Review  Outcome: Ongoing (interventions implemented as appropriate)  Pt vitals were maintained. Pt BG got low with Am BG check PT was given oj to help raise it. Pt refuses to call for help, he gets up saying he needs assist. But then he says he doesn't need any help. Pt is upset that he has a MONICA in room. Pt was reiterate to call when getting up because legs are swollen. Will continue to monitor

## 2019-07-13 NOTE — PROGRESS NOTES
NEUROENDOCRINE SURGERY PROGRESS NOTE    63yoM with multiple comorbidities with extrahepatic PV occlusion plus mild cirrhosis with CRI and mild CHF contributing to recalcitrant ascites plus non-compliance with sodium/fluids as well as fatigue due to hypokalemia and fluid overload, now s/p paracentesis, transhepatic portal venogram, pressure measurements, and venoplasty with IR 6/28/19. Complicated by ELIEZER, improved with fluids     INTERVAL HISTORY  No acute events  Afebrile VSS  No CP/SOB  Denies n/v, tolerating regular diet  Voiding, having BM  Ambulating, up in chair  Very annoyed this morning with presence of monitor, which was placed due to concern for fall risk. Pt is considering leaving AMA       VITALS:   Temp:  [97.9 °F (36.6 °C)-98.6 °F (37 °C)] 98 °F (36.7 °C)  Pulse:  [82-96] 89  Resp:  [14-18] 18  SpO2:  [98 %-99 %] 98 %  BP: ()/(54-70) 101/60    I/O last 3 completed shifts:  In: 1040 [P.O.:940; IV Piggyback:100]  Out: 1800 [Urine:1800]    EXAM  GEN: A&Ox3, NAD  HEENT: NCAT, EOMI  CV: RRR  RESP: Non-labored breathing  ABD: Obese, soft, NT/ND.   EXT: BLE with compression wraps       LABS  Recent Labs     07/11/19  0445 07/12/19  0500 07/13/19  0613   WBC 4.62 4.38 4.44   HGB 8.4* 8.5* 9.2*   HCT 27.5* 28.2* 30.5*    205 211     CMP  Recent Labs     07/11/19  0445 07/12/19  0500 07/13/19  0613    140 140   K 4.0 4.0 3.6    111* 111*   CO2 22* 23 22*   GLU 90 98 54*   BUN 36* 35* 35*   CREATININE 3.3* 3.2* 2.8*   CALCIUM 8.2* 8.3* 8.3*   PROT 4.9* 5.0* 5.2*   ALBUMIN 3.3* 3.2* 3.3*   BILITOT 0.4 0.4 0.4   ALKPHOS 121 145* 161*   AST 39 32 38   ALT 41 42 46*   ANIONGAP 7* 6* 7*   ESTGFRAFRICA 22* 22* 26*   EGFRNONAA 19* 19* 23*     Recent Labs     07/11/19  0445 07/12/19  0500 07/13/19  0613   MG 2.2 2.0 1.9   PHOS 3.2 3.2 3.2     IMAGING  No new imaging    ASSESSMENT/PLAN  63yoM with multiple comorbidities with extrahepatic PV occlusion plus mild cirrhosis with CRI and mild CHF  contributing to recalcitrant ascites plus non-compliance with sodium/fluids as well as fatigue due to hypokalemia and fluid overload, now s/p paracentesis, transhepatic portal venogram, pressure measurements, and venoplasty with IR 6/28/19. Complicated by ELIEZER, now improving    - On furosemide IV bid, will transition to 20 PO BID and add aldactone 25 BID (home dose)  - Cr improving, 2.8 this AM.   - Continue low sodium/fluid restricted diet  - Replete lytes prn  - On home plavix/eliquis  - Decreased levemir to 12 qhs due to hypoglycemia   - continue creon with meals  - reglan tid  - local wound care to bilateral leg wounds     Reyna Barajas MD  [employer] General Surgery PGY-2    creat coming down  Bd stable  Will start on lasix and aldactone    Pt does not like monitor, with h/o falls it is mandatory to be on monitor as per hospital protocol  Waiting for rehab placement

## 2019-07-13 NOTE — PLAN OF CARE
BRITTON note: BRITTON spoke with bedside nurse Amada. She reported to BRITTON patient is refusing his telemetry monitor. Dr. Barajas notified. No further orders from MD. Will continue to follow.

## 2019-07-14 LAB
ALBUMIN SERPL BCP-MCNC: 3.2 G/DL (ref 3.5–5.2)
ALP SERPL-CCNC: 157 U/L (ref 55–135)
ALT SERPL W/O P-5'-P-CCNC: 43 U/L (ref 10–44)
ANION GAP SERPL CALC-SCNC: 6 MMOL/L (ref 8–16)
AST SERPL-CCNC: 32 U/L (ref 10–40)
BASOPHILS # BLD AUTO: 0.04 K/UL (ref 0–0.2)
BASOPHILS NFR BLD: 0.9 % (ref 0–1.9)
BILIRUB SERPL-MCNC: 0.4 MG/DL (ref 0.1–1)
BUN SERPL-MCNC: 36 MG/DL (ref 8–23)
CALCIUM SERPL-MCNC: 8.2 MG/DL (ref 8.7–10.5)
CHLORIDE SERPL-SCNC: 111 MMOL/L (ref 95–110)
CO2 SERPL-SCNC: 24 MMOL/L (ref 23–29)
CREAT SERPL-MCNC: 2.8 MG/DL (ref 0.5–1.4)
DIFFERENTIAL METHOD: ABNORMAL
EOSINOPHIL # BLD AUTO: 0.2 K/UL (ref 0–0.5)
EOSINOPHIL NFR BLD: 3.2 % (ref 0–8)
ERYTHROCYTE [DISTWIDTH] IN BLOOD BY AUTOMATED COUNT: 20.3 % (ref 11.5–14.5)
EST. GFR  (AFRICAN AMERICAN): 26 ML/MIN/1.73 M^2
EST. GFR  (NON AFRICAN AMERICAN): 23 ML/MIN/1.73 M^2
GLUCOSE SERPL-MCNC: 62 MG/DL (ref 70–110)
HCT VFR BLD AUTO: 31.4 % (ref 40–54)
HGB BLD-MCNC: 9.3 G/DL (ref 14–18)
INR PPP: 1.2 (ref 0.8–1.2)
LYMPHOCYTES # BLD AUTO: 1.9 K/UL (ref 1–4.8)
LYMPHOCYTES NFR BLD: 41.2 % (ref 18–48)
MAGNESIUM SERPL-MCNC: 1.7 MG/DL (ref 1.6–2.6)
MCH RBC QN AUTO: 29.2 PG (ref 27–31)
MCHC RBC AUTO-ENTMCNC: 29.6 G/DL (ref 32–36)
MCV RBC AUTO: 98 FL (ref 82–98)
MONOCYTES # BLD AUTO: 0.5 K/UL (ref 0.3–1)
MONOCYTES NFR BLD: 9.9 % (ref 4–15)
NEUTROPHILS # BLD AUTO: 2.1 K/UL (ref 1.8–7.7)
NEUTROPHILS NFR BLD: 44.8 % (ref 38–73)
PHOSPHATE SERPL-MCNC: 3.5 MG/DL (ref 2.7–4.5)
PLATELET # BLD AUTO: 228 K/UL (ref 150–350)
PMV BLD AUTO: 9.1 FL (ref 9.2–12.9)
POCT GLUCOSE: 103 MG/DL (ref 70–110)
POCT GLUCOSE: 108 MG/DL (ref 70–110)
POCT GLUCOSE: 171 MG/DL (ref 70–110)
POCT GLUCOSE: 192 MG/DL (ref 70–110)
POTASSIUM SERPL-SCNC: 3.8 MMOL/L (ref 3.5–5.1)
PROT SERPL-MCNC: 5.1 G/DL (ref 6–8.4)
PROTHROMBIN TIME: 12.2 SEC (ref 9–12.5)
RBC # BLD AUTO: 3.19 M/UL (ref 4.6–6.2)
SODIUM SERPL-SCNC: 141 MMOL/L (ref 136–145)
WBC # BLD AUTO: 4.64 K/UL (ref 3.9–12.7)

## 2019-07-14 PROCEDURE — 80053 COMPREHEN METABOLIC PANEL: CPT

## 2019-07-14 PROCEDURE — 63600175 PHARM REV CODE 636 W HCPCS: Performed by: STUDENT IN AN ORGANIZED HEALTH CARE EDUCATION/TRAINING PROGRAM

## 2019-07-14 PROCEDURE — 83735 ASSAY OF MAGNESIUM: CPT

## 2019-07-14 PROCEDURE — 94761 N-INVAS EAR/PLS OXIMETRY MLT: CPT

## 2019-07-14 PROCEDURE — 25000003 PHARM REV CODE 250: Performed by: STUDENT IN AN ORGANIZED HEALTH CARE EDUCATION/TRAINING PROGRAM

## 2019-07-14 PROCEDURE — 85025 COMPLETE CBC W/AUTO DIFF WBC: CPT

## 2019-07-14 PROCEDURE — 85610 PROTHROMBIN TIME: CPT

## 2019-07-14 PROCEDURE — 84100 ASSAY OF PHOSPHORUS: CPT

## 2019-07-14 PROCEDURE — 63600175 PHARM REV CODE 636 W HCPCS: Performed by: SURGERY

## 2019-07-14 PROCEDURE — 11000001 HC ACUTE MED/SURG PRIVATE ROOM

## 2019-07-14 PROCEDURE — 25000003 PHARM REV CODE 250: Performed by: SURGERY

## 2019-07-14 RX ADMIN — PANCRELIPASE 1 CAPSULE: 24000; 76000; 120000 CAPSULE, DELAYED RELEASE PELLETS ORAL at 05:07

## 2019-07-14 RX ADMIN — MIDODRINE HYDROCHLORIDE 2.5 MG: 2.5 TABLET ORAL at 05:07

## 2019-07-14 RX ADMIN — PANCRELIPASE 2 CAPSULE: 30000; 6000; 19000 CAPSULE, DELAYED RELEASE PELLETS ORAL at 08:07

## 2019-07-14 RX ADMIN — SUCRALFATE 1 G: 1 SUSPENSION ORAL at 06:07

## 2019-07-14 RX ADMIN — ATORVASTATIN CALCIUM 40 MG: 40 TABLET, FILM COATED ORAL at 09:07

## 2019-07-14 RX ADMIN — APIXABAN 5 MG: 5 TABLET, FILM COATED ORAL at 10:07

## 2019-07-14 RX ADMIN — PANCRELIPASE 2 CAPSULE: 30000; 6000; 19000 CAPSULE, DELAYED RELEASE PELLETS ORAL at 05:07

## 2019-07-14 RX ADMIN — METOPROLOL SUCCINATE 25 MG: 25 TABLET, FILM COATED, EXTENDED RELEASE ORAL at 09:07

## 2019-07-14 RX ADMIN — EPOETIN ALFA-EPBX 10000 UNITS: 10000 INJECTION, SOLUTION INTRAVENOUS; SUBCUTANEOUS at 09:07

## 2019-07-14 RX ADMIN — TAMSULOSIN HYDROCHLORIDE 0.4 MG: 0.4 CAPSULE ORAL at 09:07

## 2019-07-14 RX ADMIN — CYANOCOBALAMIN 1000 MCG: 1000 INJECTION, SOLUTION INTRAMUSCULAR at 09:07

## 2019-07-14 RX ADMIN — INSULIN ASPART 4 UNITS: 100 INJECTION, SOLUTION INTRAVENOUS; SUBCUTANEOUS at 12:07

## 2019-07-14 RX ADMIN — PANCRELIPASE 2 CAPSULE: 30000; 6000; 19000 CAPSULE, DELAYED RELEASE PELLETS ORAL at 12:07

## 2019-07-14 RX ADMIN — METOCLOPRAMIDE 10 MG: 10 TABLET ORAL at 06:07

## 2019-07-14 RX ADMIN — FUROSEMIDE 20 MG: 20 TABLET ORAL at 05:07

## 2019-07-14 RX ADMIN — FAMOTIDINE 20 MG: 20 TABLET, FILM COATED ORAL at 09:07

## 2019-07-14 RX ADMIN — INSULIN DETEMIR 12 UNITS: 100 INJECTION, SOLUTION SUBCUTANEOUS at 10:07

## 2019-07-14 RX ADMIN — FUROSEMIDE 20 MG: 20 TABLET ORAL at 09:07

## 2019-07-14 RX ADMIN — METOCLOPRAMIDE 10 MG: 10 TABLET ORAL at 05:07

## 2019-07-14 RX ADMIN — IRON SUCROSE 100 MG: 20 INJECTION, SOLUTION INTRAVENOUS at 06:07

## 2019-07-14 RX ADMIN — MIDODRINE HYDROCHLORIDE 2.5 MG: 2.5 TABLET ORAL at 12:07

## 2019-07-14 RX ADMIN — CLOPIDOGREL BISULFATE 75 MG: 75 TABLET ORAL at 09:07

## 2019-07-14 RX ADMIN — PANCRELIPASE 1 CAPSULE: 24000; 76000; 120000 CAPSULE, DELAYED RELEASE PELLETS ORAL at 08:07

## 2019-07-14 RX ADMIN — APIXABAN 5 MG: 5 TABLET, FILM COATED ORAL at 09:07

## 2019-07-14 RX ADMIN — SUCRALFATE 1 G: 1 SUSPENSION ORAL at 12:07

## 2019-07-14 RX ADMIN — METOCLOPRAMIDE 10 MG: 10 TABLET ORAL at 12:07

## 2019-07-14 RX ADMIN — SPIRONOLACTONE 25 MG: 25 TABLET, FILM COATED ORAL at 09:07

## 2019-07-14 RX ADMIN — PANCRELIPASE 1 CAPSULE: 24000; 76000; 120000 CAPSULE, DELAYED RELEASE PELLETS ORAL at 12:07

## 2019-07-14 RX ADMIN — ISOSORBIDE MONONITRATE 30 MG: 30 TABLET, EXTENDED RELEASE ORAL at 09:07

## 2019-07-14 RX ADMIN — SUCRALFATE 1 G: 1 SUSPENSION ORAL at 05:07

## 2019-07-14 RX ADMIN — MIDODRINE HYDROCHLORIDE 2.5 MG: 2.5 TABLET ORAL at 08:07

## 2019-07-14 RX ADMIN — SPIRONOLACTONE 25 MG: 25 TABLET, FILM COATED ORAL at 10:07

## 2019-07-14 NOTE — PLAN OF CARE
"Cued into patient's room.  Permission received per patient to turn camera to view patient.  Introduced as VN for night shift that will be working with floor nurse and nursing assistant.  Sitting up in chair.  Educated patient on VN's role in patient care. Plan of care reviewed with patient. Education per flowsheet.  Opportunity given for questions and questions answered.  Refusing to wear telemetry monitor. Denies pain, n/v, ands sob.  Refusing to elevate B LE stating that this is the smallest his legs have been in a while and he "can't please everyone".  Instructed to call for assistance.  AVASYS at bedside.  Will cont to monitor.  "

## 2019-07-14 NOTE — PROGRESS NOTES
NEUROENDOCRINE SURGERY PROGRESS NOTE    63yoM with multiple comorbidities with extrahepatic PV occlusion plus mild cirrhosis with CRI and mild CHF contributing to recalcitrant ascites plus non-compliance with sodium/fluids as well as fatigue due to hypokalemia and fluid overload, now s/p paracentesis, transhepatic portal venogram, pressure measurements, and venoplasty with IR 6/28/19. Complicated by ELIEZER, improved with fluids     INTERVAL HISTORY  No acute events  Afebrile VSS  No CP/SOB  Denies n/v, tolerating regular diet  Voiding, having BM  Ambulating, up in chair  Patient without complaints this AM.       VITALS:   Temp:  [98 °F (36.7 °C)-99 °F (37.2 °C)] 99 °F (37.2 °C)  Pulse:  [82-95] 83  Resp:  [16-19] 18  SpO2:  [99 %] 99 %  BP: ()/(54-68) 119/68    I/O last 3 completed shifts:  In: 1180 [P.O.:1080; IV Piggyback:100]  Out: 3426 [Urine:3425; Stool:1]    EXAM  GEN: A&Ox3, NAD  HEENT: NCAT, EOMI  CV: RRR  RESP: Non-labored breathing  ABD: Obese, soft, NT/ND.   EXT: BLE with compression wraps       LABS  Recent Labs     07/12/19  0500 07/13/19  0613 07/14/19  0622   WBC 4.38 4.44 4.64   HGB 8.5* 9.2* 9.3*   HCT 28.2* 30.5* 31.4*    211 228     CMP  Recent Labs     07/12/19  0500 07/13/19  0613 07/14/19  0622    140 141   K 4.0 3.6 3.8   * 111* 111*   CO2 23 22* 24   GLU 98 54* 62*   BUN 35* 35* 36*   CREATININE 3.2* 2.8* 2.8*   CALCIUM 8.3* 8.3* 8.2*   PROT 5.0* 5.2* 5.1*   ALBUMIN 3.2* 3.3* 3.2*   BILITOT 0.4 0.4 0.4   ALKPHOS 145* 161* 157*   AST 32 38 32   ALT 42 46* 43   ANIONGAP 6* 7* 6*   ESTGFRAFRICA 22* 26* 26*   EGFRNONAA 19* 23* 23*     Recent Labs     07/12/19  0500 07/13/19  0613 07/14/19  0622   MG 2.0 1.9 1.7   PHOS 3.2 3.2 3.5     IMAGING  No new imaging    ASSESSMENT/PLAN  63yoM with multiple comorbidities with extrahepatic PV occlusion plus mild cirrhosis with CRI and mild CHF contributing to recalcitrant ascites plus non-compliance with sodium/fluids as well as fatigue  due to hypokalemia and fluid overload, now s/p paracentesis, transhepatic portal venogram, pressure measurements, and venoplasty with IR 6/28/19. Complicated by ELIEZER, now improving    - On furosemide IV bid, will transition to 20 PO BID and add aldactone 25 BID (home dose)  - Cr improving, 2.8 this AM.   - Continue low sodium/fluid restricted diet  - Replete lytes prn  - On home plavix/eliquis  - Decreased levemir to 12 qhs due to hypoglycemia   - continue creon with meals  - reglan tid  - local wound care to bilateral leg wounds   - Discontinued IV iron replacement.     Dispo: awaiting SNF placement.     Liliane Churchill MD  [employer] General Surgery PGY-2      Stable abd comfortable  lasi and aldactone started  Awaiting placement

## 2019-07-14 NOTE — PLAN OF CARE
Problem: Adult Inpatient Plan of Care  Goal: Plan of Care Review  Outcome: Ongoing (interventions implemented as appropriate)  Patient is resting and AAOx4. Medications given per orders in MAR. PRN medication given per orders in MAR. Blood glucose monitoring, with sliding scale orders in place. Dressing to lefts remained CDI. Patient sat in chair during the beginning of shift, then went to bed. MONICA in room. Patient refused heart monitor, charge nurse notified. No complaints of pain/N/V. Patient remained free from fall, bed alarm on, and safety maintained. Instructed patient to call about concerns, needs, or assistance. Will continue to monitor.

## 2019-07-15 VITALS
DIASTOLIC BLOOD PRESSURE: 60 MMHG | HEART RATE: 89 BPM | SYSTOLIC BLOOD PRESSURE: 124 MMHG | OXYGEN SATURATION: 98 % | TEMPERATURE: 98 F | HEIGHT: 67 IN | RESPIRATION RATE: 18 BRPM | WEIGHT: 268.31 LBS | BODY MASS INDEX: 42.11 KG/M2

## 2019-07-15 DIAGNOSIS — I89.0 LYMPHEDEMA OF BOTH LOWER EXTREMITIES: ICD-10-CM

## 2019-07-15 DIAGNOSIS — R26.89 DECREASED MOBILITY: Primary | ICD-10-CM

## 2019-07-15 LAB
ALBUMIN SERPL BCP-MCNC: 3.3 G/DL (ref 3.5–5.2)
ALP SERPL-CCNC: 149 U/L (ref 55–135)
ALT SERPL W/O P-5'-P-CCNC: 41 U/L (ref 10–44)
ANION GAP SERPL CALC-SCNC: 5 MMOL/L (ref 8–16)
AST SERPL-CCNC: 29 U/L (ref 10–40)
BACTERIA BLD CULT: NORMAL
BASOPHILS # BLD AUTO: 0.05 K/UL (ref 0–0.2)
BASOPHILS NFR BLD: 0.9 % (ref 0–1.9)
BILIRUB SERPL-MCNC: 0.4 MG/DL (ref 0.1–1)
BUN SERPL-MCNC: 34 MG/DL (ref 8–23)
CALCIUM SERPL-MCNC: 8.4 MG/DL (ref 8.7–10.5)
CHLORIDE SERPL-SCNC: 110 MMOL/L (ref 95–110)
CO2 SERPL-SCNC: 25 MMOL/L (ref 23–29)
CREAT SERPL-MCNC: 2.7 MG/DL (ref 0.5–1.4)
DIFFERENTIAL METHOD: ABNORMAL
EOSINOPHIL # BLD AUTO: 0.2 K/UL (ref 0–0.5)
EOSINOPHIL NFR BLD: 3.3 % (ref 0–8)
ERYTHROCYTE [DISTWIDTH] IN BLOOD BY AUTOMATED COUNT: 20.3 % (ref 11.5–14.5)
EST. GFR  (AFRICAN AMERICAN): 28 ML/MIN/1.73 M^2
EST. GFR  (NON AFRICAN AMERICAN): 24 ML/MIN/1.73 M^2
GLUCOSE SERPL-MCNC: 71 MG/DL (ref 70–110)
HCT VFR BLD AUTO: 32.4 % (ref 40–54)
HGB BLD-MCNC: 9.8 G/DL (ref 14–18)
INR PPP: 1.2 (ref 0.8–1.2)
LYMPHOCYTES # BLD AUTO: 2.2 K/UL (ref 1–4.8)
LYMPHOCYTES NFR BLD: 37.9 % (ref 18–48)
MAGNESIUM SERPL-MCNC: 1.5 MG/DL (ref 1.6–2.6)
MCH RBC QN AUTO: 29.8 PG (ref 27–31)
MCHC RBC AUTO-ENTMCNC: 30.2 G/DL (ref 32–36)
MCV RBC AUTO: 99 FL (ref 82–98)
MONOCYTES # BLD AUTO: 0.5 K/UL (ref 0.3–1)
MONOCYTES NFR BLD: 7.9 % (ref 4–15)
NEUTROPHILS # BLD AUTO: 2.8 K/UL (ref 1.8–7.7)
NEUTROPHILS NFR BLD: 50 % (ref 38–73)
PHOSPHATE SERPL-MCNC: 3.4 MG/DL (ref 2.7–4.5)
PLATELET # BLD AUTO: 270 K/UL (ref 150–350)
PMV BLD AUTO: 9.2 FL (ref 9.2–12.9)
POCT GLUCOSE: 132 MG/DL (ref 70–110)
POCT GLUCOSE: 231 MG/DL (ref 70–110)
POCT GLUCOSE: 63 MG/DL (ref 70–110)
POCT GLUCOSE: 98 MG/DL (ref 70–110)
POTASSIUM SERPL-SCNC: 4.1 MMOL/L (ref 3.5–5.1)
PROT SERPL-MCNC: 5.4 G/DL (ref 6–8.4)
PROTHROMBIN TIME: 12.3 SEC (ref 9–12.5)
RBC # BLD AUTO: 3.29 M/UL (ref 4.6–6.2)
SODIUM SERPL-SCNC: 140 MMOL/L (ref 136–145)
WBC # BLD AUTO: 5.7 K/UL (ref 3.9–12.7)

## 2019-07-15 PROCEDURE — 85025 COMPLETE CBC W/AUTO DIFF WBC: CPT

## 2019-07-15 PROCEDURE — 25000003 PHARM REV CODE 250: Performed by: STUDENT IN AN ORGANIZED HEALTH CARE EDUCATION/TRAINING PROGRAM

## 2019-07-15 PROCEDURE — 97535 SELF CARE MNGMENT TRAINING: CPT

## 2019-07-15 PROCEDURE — 63600175 PHARM REV CODE 636 W HCPCS: Performed by: STUDENT IN AN ORGANIZED HEALTH CARE EDUCATION/TRAINING PROGRAM

## 2019-07-15 PROCEDURE — 84100 ASSAY OF PHOSPHORUS: CPT

## 2019-07-15 PROCEDURE — 83735 ASSAY OF MAGNESIUM: CPT

## 2019-07-15 PROCEDURE — 97116 GAIT TRAINING THERAPY: CPT

## 2019-07-15 PROCEDURE — 97110 THERAPEUTIC EXERCISES: CPT

## 2019-07-15 PROCEDURE — 85610 PROTHROMBIN TIME: CPT

## 2019-07-15 PROCEDURE — 80053 COMPREHEN METABOLIC PANEL: CPT

## 2019-07-15 PROCEDURE — 97530 THERAPEUTIC ACTIVITIES: CPT

## 2019-07-15 RX ORDER — METOPROLOL SUCCINATE 25 MG/1
25 TABLET, EXTENDED RELEASE ORAL DAILY
Qty: 30 TABLET | Refills: 11 | Status: SHIPPED | OUTPATIENT
Start: 2019-07-15 | End: 2019-07-15

## 2019-07-15 RX ORDER — FUROSEMIDE 20 MG/1
20 TABLET ORAL 2 TIMES DAILY
Qty: 60 TABLET | Refills: 11 | Status: SHIPPED | OUTPATIENT
Start: 2019-07-15 | End: 2020-07-14

## 2019-07-15 RX ORDER — RAMELTEON 8 MG/1
8 TABLET ORAL NIGHTLY PRN
Qty: 30 TABLET | Refills: 11 | Status: ON HOLD | OUTPATIENT
Start: 2019-07-15 | End: 2020-03-02 | Stop reason: CLARIF

## 2019-07-15 RX ORDER — MIDODRINE HYDROCHLORIDE 2.5 MG/1
2.5 TABLET ORAL
Qty: 90 TABLET | Refills: 11 | Status: SHIPPED | OUTPATIENT
Start: 2019-07-15 | End: 2019-08-26

## 2019-07-15 RX ORDER — METOCLOPRAMIDE 10 MG/1
10 TABLET ORAL
Qty: 90 TABLET | Refills: 3 | Status: SHIPPED | OUTPATIENT
Start: 2019-07-15 | End: 2019-07-15

## 2019-07-15 RX ORDER — ISOSORBIDE MONONITRATE 30 MG/1
30 TABLET, EXTENDED RELEASE ORAL DAILY
Qty: 30 TABLET | Refills: 11 | Status: SHIPPED | OUTPATIENT
Start: 2019-07-15 | End: 2019-07-15

## 2019-07-15 RX ORDER — RAMELTEON 8 MG/1
8 TABLET ORAL NIGHTLY PRN
Qty: 30 TABLET | Refills: 11 | Status: SHIPPED | OUTPATIENT
Start: 2019-07-15 | End: 2019-07-15

## 2019-07-15 RX ORDER — MIDODRINE HYDROCHLORIDE 2.5 MG/1
2.5 TABLET ORAL
Qty: 90 TABLET | Refills: 11 | Status: SHIPPED | OUTPATIENT
Start: 2019-07-15 | End: 2019-07-15

## 2019-07-15 RX ORDER — FUROSEMIDE 20 MG/1
20 TABLET ORAL 2 TIMES DAILY
Qty: 60 TABLET | Refills: 11 | Status: SHIPPED | OUTPATIENT
Start: 2019-07-15 | End: 2019-07-15

## 2019-07-15 RX ORDER — METOCLOPRAMIDE 10 MG/1
10 TABLET ORAL
Qty: 90 TABLET | Refills: 3 | Status: SHIPPED | OUTPATIENT
Start: 2019-07-15 | End: 2019-08-26

## 2019-07-15 RX ORDER — METOPROLOL SUCCINATE 25 MG/1
25 TABLET, EXTENDED RELEASE ORAL DAILY
Qty: 30 TABLET | Refills: 11 | Status: SHIPPED | OUTPATIENT
Start: 2019-07-15 | End: 2019-08-26

## 2019-07-15 RX ORDER — INSULIN GLARGINE 100 [IU]/ML
12 INJECTION, SOLUTION SUBCUTANEOUS NIGHTLY
Qty: 15 ML | Refills: 12 | Status: SHIPPED | OUTPATIENT
Start: 2019-07-15 | End: 2019-11-15

## 2019-07-15 RX ORDER — ISOSORBIDE MONONITRATE 30 MG/1
30 TABLET, EXTENDED RELEASE ORAL DAILY
Qty: 30 TABLET | Refills: 11 | Status: SHIPPED | OUTPATIENT
Start: 2019-07-15 | End: 2019-08-26

## 2019-07-15 RX ADMIN — PANCRELIPASE 2 CAPSULE: 30000; 6000; 19000 CAPSULE, DELAYED RELEASE PELLETS ORAL at 12:07

## 2019-07-15 RX ADMIN — ATORVASTATIN CALCIUM 40 MG: 40 TABLET, FILM COATED ORAL at 08:07

## 2019-07-15 RX ADMIN — CYANOCOBALAMIN 1000 MCG: 1000 INJECTION, SOLUTION INTRAMUSCULAR at 08:07

## 2019-07-15 RX ADMIN — SPIRONOLACTONE 25 MG: 25 TABLET, FILM COATED ORAL at 08:07

## 2019-07-15 RX ADMIN — RAMELTEON 8 MG: 8 TABLET, FILM COATED ORAL at 01:07

## 2019-07-15 RX ADMIN — MIDODRINE HYDROCHLORIDE 2.5 MG: 2.5 TABLET ORAL at 08:07

## 2019-07-15 RX ADMIN — TAMSULOSIN HYDROCHLORIDE 0.4 MG: 0.4 CAPSULE ORAL at 08:07

## 2019-07-15 RX ADMIN — SUCRALFATE 1 G: 1 SUSPENSION ORAL at 12:07

## 2019-07-15 RX ADMIN — CLOPIDOGREL BISULFATE 75 MG: 75 TABLET ORAL at 08:07

## 2019-07-15 RX ADMIN — EPOETIN ALFA-EPBX 10000 UNITS: 10000 INJECTION, SOLUTION INTRAVENOUS; SUBCUTANEOUS at 08:07

## 2019-07-15 RX ADMIN — ISOSORBIDE MONONITRATE 30 MG: 30 TABLET, EXTENDED RELEASE ORAL at 08:07

## 2019-07-15 RX ADMIN — METOCLOPRAMIDE 10 MG: 10 TABLET ORAL at 12:07

## 2019-07-15 RX ADMIN — PANCRELIPASE 1 CAPSULE: 24000; 76000; 120000 CAPSULE, DELAYED RELEASE PELLETS ORAL at 12:07

## 2019-07-15 RX ADMIN — MIDODRINE HYDROCHLORIDE 2.5 MG: 2.5 TABLET ORAL at 12:07

## 2019-07-15 RX ADMIN — APIXABAN 5 MG: 5 TABLET, FILM COATED ORAL at 08:07

## 2019-07-15 RX ADMIN — FAMOTIDINE 20 MG: 20 TABLET, FILM COATED ORAL at 08:07

## 2019-07-15 RX ADMIN — SUCRALFATE 1 G: 1 SUSPENSION ORAL at 06:07

## 2019-07-15 RX ADMIN — PANCRELIPASE 2 CAPSULE: 30000; 6000; 19000 CAPSULE, DELAYED RELEASE PELLETS ORAL at 08:07

## 2019-07-15 RX ADMIN — FUROSEMIDE 20 MG: 20 TABLET ORAL at 08:07

## 2019-07-15 RX ADMIN — PANCRELIPASE 1 CAPSULE: 24000; 76000; 120000 CAPSULE, DELAYED RELEASE PELLETS ORAL at 08:07

## 2019-07-15 RX ADMIN — METOCLOPRAMIDE 10 MG: 10 TABLET ORAL at 06:07

## 2019-07-15 NOTE — PLAN OF CARE
Per Erika at [facility] she does not have any beds today.    Isaiah at [facility] stated she received the referral and will have to submit to patient's insurance for insurance authorization. Isaiah stated she is unsure if the new company ([employer]) is in network.     spoke with Ifrah at [facility] [phone] to check on status of referral. Ifrah requested  to e-mail her the referral to [email or web address].    Once received, Ifrah will review the referral and follow up with .     spoke with Halie [phone], admissions coordinator at  [facility] to inquire on the status of this referral. Halie stated she has 4 beds and a few referrals to consider.  submitted a SNF referral via [facility].

## 2019-07-15 NOTE — PLAN OF CARE
Admissions nurse Ifrah Esparza, with [facility]( [facility]) present at hospital to meet with patient at bedside.

## 2019-07-15 NOTE — PLAN OF CARE
Problem: Physical Therapy Goal  Goal: Physical Therapy Goal  Goals to be met by: 2019    Patient will increase functional independence with mobility by performin. Sit to supine with Mod I  2. Sit to stand transfer with Mod I  3. Bed to chair transfer with Supervision using Rolling Walker  3. Gait  x150 feet with Supervision using Rolling Walker.   4. Lower extremity exercise program x12 reps per handout, with supervision  MET 19         Outcome: Ongoing (interventions implemented as appropriate)  New recommendations Home with out patient PT

## 2019-07-15 NOTE — PHYSICIAN QUERY
PT Name: Jian Arrieta  MR #: [id number]     PHYSICIAN QUERY -  ACUITY OF CONDITION CLARIFICATION      CDS: Veronica Paul RN     Contact information:  [email or web address]    Phone: [phone]  This form is a permanent document in the medical record.     Query Date: July 15, 2019    By submitting this query, we are merely seeking further clarification of documentation to reflect the severity of illness of your patient. Please utilize your independent clinical judgment when addressing the question(s) below.    The Medical record reflects the following:     Indicators   Supporting Clinical Findings Location in Medical Record    X Documentation of condition - Normal esophagus.                        - Erosive gastropathy. Biopsied. Rule out portal                         hypertensive gastropathy vs. H pylori                        - Normal duodenal bulb.  EGD 7/8/19    Lab Value(s)      Radiology Findings      X Treatment                                 Medication famotidine Dose: 20 mg Freq: 2 times daily Route: IV  Start: 06/27/19 2100 End: 07/02/19 1141    famotidine Dose: 20 mg Freq: Daily Route: IV  Start: 07/03/19 0900 End: 07/05/19 1432    famotidine Dose: 20 mg Freq: Daily Route: Oral    MAR       MAR       MAR 7/6/19-- current    X Other  FINAL PATHOLOGIC DIAGNOSIS  GASTRIC BIOPSIES:  NO SIGNIFICANT HISTOLOGIC ALTERATION    morbid obesity (BMI 41), stage 1 hepatic fibrosis (biopsy negative for cirrhosis), chronic pancreatitis c/b large volume ascites requiring twice weekly paracenteses in addition to pancreatic pseudocyst  Path 7/8/19     Provider, please specify the acuity/chronicity of _Erosive gastropathy____:    [  x ] Acute   [   ] Chronic   [   ] Acute and/on chronic   [   ]  Clinically Undetermined       Please document in your progress notes daily for the duration of treatment until resolved, and include in your discharge summary.

## 2019-07-15 NOTE — NURSING
Pt being discharged home in stable condition. PICC line left in place as ordered by MD. AVS packet given to pt. Charge nurse Sakina went over discharge instructions with pt.

## 2019-07-15 NOTE — PROGRESS NOTES
NEUROENDOCRINE SURGERY PROGRESS NOTE    63yoM with multiple comorbidities with extrahepatic PV occlusion plus mild cirrhosis with CRI and mild CHF contributing to recalcitrant ascites plus non-compliance with sodium/fluids as well as fatigue due to hypokalemia and fluid overload, now s/p paracentesis, transhepatic portal venogram, pressure measurements, and venoplasty with IR 6/28/19. Complicated by ELIEZER, improved with fluids     INTERVAL HISTORY  No acute events  Afebrile VSS  No CP/SOB  Denies n/v, tolerating regular diet  Voiding, having BM  Ambulating, up in chair  Patient without complaints this AM. He has questions about the status of his medical conditions.       VITALS:   Temp:  [98.2 °F (36.8 °C)-99 °F (37.2 °C)] 98.6 °F (37 °C)  Pulse:  [79-95] 79  Resp:  [16-18] 16  SpO2:  [97 %-98 %] 98 %  BP: ()/(58-68) 150/58    I/O last 3 completed shifts:  In: 550 [P.O.:450; IV Piggyback:100]  Out: 3926 [Urine:3925; Stool:1]    EXAM  GEN: A&Ox3, NAD  HEENT: NCAT, EOMI  CV: RRR  RESP: Non-labored breathing  ABD: Obese, soft, NT/ND.   EXT: BLE with compression wraps       LABS  Recent Labs     07/13/19  0613 07/14/19  0622   WBC 4.44 4.64   HGB 9.2* 9.3*   HCT 30.5* 31.4*    228     CMP  Recent Labs     07/13/19  0613 07/14/19  0622    141   K 3.6 3.8   * 111*   CO2 22* 24   GLU 54* 62*   BUN 35* 36*   CREATININE 2.8* 2.8*   CALCIUM 8.3* 8.2*   PROT 5.2* 5.1*   ALBUMIN 3.3* 3.2*   BILITOT 0.4 0.4   ALKPHOS 161* 157*   AST 38 32   ALT 46* 43   ANIONGAP 7* 6*   ESTGFRAFRICA 26* 26*   EGFRNONAA 23* 23*     Recent Labs     07/13/19  0613 07/14/19  0622   MG 1.9 1.7   PHOS 3.2 3.5     IMAGING  No new imaging    ASSESSMENT/PLAN  63yoM with multiple comorbidities with extrahepatic PV occlusion plus mild cirrhosis with CRI and mild CHF contributing to recalcitrant ascites plus non-compliance with sodium/fluids as well as fatigue due to hypokalemia and fluid overload, now s/p paracentesis, transhepatic  portal venogram, pressure measurements, and venoplasty with IR 6/28/19. Complicated by ELIEZER, now improving    - Transitioned to 20 PO BID and aldactone 25 BID (home dose)  - Cr improving, 2.7 this AM.   - Continue low sodium/fluid restricted diet  - Replete lytes prn  - On home plavix/eliquis  - Decreased levemir to 12 qhs due to hypoglycemia   - continue creon with meals  - reglan tid  - local wound care to bilateral leg wounds   - Discontinued IV iron replacement.     Dispo: awaiting SNF placement.     Liliane Churchill MD  [employer] General Surgery PGY-2

## 2019-07-15 NOTE — PLAN OF CARE
Problem: Adult Inpatient Plan of Care  Goal: Plan of Care Review  Outcome: Ongoing (interventions implemented as appropriate)  Patient is resting and AAOx4. Medications given per orders in MAR. PRN sleeping medication given per orders in MAR. No complaints of N/V/pain during shift. Patient refused tele montior., charged nurse informed. MONICA system in use and within room. Blood glucose monitoring, with sliding scale. Was informed that patient may be discharged today. Patient remained free from falls, sat in chair during beginning of shift and while sleeping, and instructed to call about concerns, needs, or assistance. Will continue to monitor.

## 2019-07-15 NOTE — PT/OT/SLP PROGRESS
Physical Therapy Treatment    Patient Name:  Jian Arrieta   MRN:  [id number]    Recommendations:     Discharge Recommendations:  home, outpatient PT   Discharge Equipment Recommendations: none   Barriers to discharge: None    Assessment:     Jian Arrieta is a 63 y.o. male admitted with a medical diagnosis of Portal hypertension.  He presents with the following impairments/functional limitations:  weakness, impaired functional mobilty, edema, impaired skin, impaired endurance, impaired balance, decreased lower extremity function, decreased ROM, gait instability . Patient taken to 4th floor for stair climbing able to perform 4 times and then 3 times both higher and lower level steps.    Rehab Prognosis: Good; patient would benefit from acute skilled PT services to address these deficits and reach maximum level of function.    Recent Surgery: Procedure(s) (LRB):  ESOPHAGOGASTRODUODENOSCOPY (EGD) (N/A) 7 Days Post-Op    Plan:     During this hospitalization, patient to be seen 6 x/week to address the identified rehab impairments via gait training, therapeutic activities, therapeutic exercises and progress toward the following goals:    · Plan of Care Expires:  08/07/19    Subjective     Chief Complaint: none voiced  Patient/Family Comments/goals: go home  Pain/Comfort:  · Pain Rating 1: 0/10  · Pain Rating Post-Intervention 1: 0/10      Objective:     Communicated with primary nurse prior to session.  Patient found up in chair with telemetry upon PT entry to room.     General Precautions: Standard, fall   Orthopedic Precautions:N/A   Braces: N/A     Functional Mobility:  · Transfers:     · Sit to Stand:  modified independence and supervision with no AD  · Balance: fair +/ Good -  · Stairs:  Pt ascended/descended 6 stair(s) with No Assistive Device with left handrail and right handrail with Stand-by Assistance.       AM-PAC 6 CLICK MOBILITY  Turning over in bed (including adjusting bedclothes, sheets and blankets)?:  4  Sitting down on and standing up from a chair with arms (e.g., wheelchair, bedside commode, etc.): 4  Moving from lying on back to sitting on the side of the bed?: 4  Moving to and from a bed to a chair (including a wheelchair)?: 4  Need to walk in hospital room?: 4  Climbing 3-5 steps with a railing?: 3  Basic Mobility Total Score: 23       Therapeutic Activities and Exercises:   Gait with RW and without RW in room supervision to SBA    Patient left up in chair with call button in reach..    GOALS:   Multidisciplinary Problems     Physical Therapy Goals        Problem: Physical Therapy Goal    Goal Priority Disciplines Outcome Goal Variances Interventions   Physical Therapy Goal     PT, PT/OT Ongoing (interventions implemented as appropriate)     Description:  Goals to be met by: 2019    Patient will increase functional independence with mobility by performin. Sit to supine with Mod I  2. Sit to stand transfer with Mod I  3. Bed to chair transfer with Supervision using Rolling Walker  3. Gait  x150 feet with Supervision using Rolling Walker.   4. Lower extremity exercise program x12 reps per handout, with supervision  MET 19                          Time Tracking:     PT Received On: 07/15/19  PT Start Time: 1135     PT Stop Time: 1205  PT Total Time (min): 30 min     Billable Minutes: Gait Training 10 and Therapeutic Exercise 15    Treatment Type: Treatment  PT/PTA: PT     PTA Visit Number: 4     Huan Schumacher, PT  07/15/2019

## 2019-07-15 NOTE — PROGRESS NOTES
Ambulatory referral to Occupational Therapy [[id number]] (Order [id number])   Outpatient Referral   Date and Time: 7/15/2019  2:00 PM Department: [facility] Medical Surgical Unit Acute Rel By/Authorizing: Liliane Churchill MD   Dept Order Information     Date Department   7/15/2019 [facility] Acute   Order Information     Order Date/Time Release Date/Time Start Date/Time End Date/Time   07/15/19 02:00 PM None 7/15/2019 None   Order Details     Frequency Duration Priority Order Class   None None Routine Internal Referral   Quantity     Ordering Quantity   1   Quantity     Ordering Quantity Ordering Quantity   1 1   Order Details     Order ID   [id number]   Reprint Order Requisition     Ambulatory referral to Occupational Therapy (Order #[id number]) on 7/15/19   Associated Diagnoses      ICD-10-CM ICD-9-CM   Lymphedema of both lower extremities     I89.0 457.1   Decreased mobility     R26.89 781.99   Order Questions     Question Answer Comment   Post Surgical? No    Eval and Treat Yes           Collection Information     Patient Details   MRN:[id number]   Name Gender Identity  N   Florian Arrieta Male 1955       Address Phone Email Aliases   [address] [place] [address] Home: [phone]   Work:    Cell: [phone]    [email or web address] FLORIAN ARRIETA      Inpatient Unit Inpatient Room Inpatient Bed    [facility] MEDICAL SURGICAL UNIT ACUTE K531 K531 A       PCP Allergies     Leon Barajas, DO No Known Allergies      Primary Visit Coverage     Payer Plan Sponsor Code Group Number Group Name   [facility] CHOICE  [id number]    Primary Visit Coverage subscriber     Subscriber ID Subscriber Name Subscriber Address   [id number] FLORIAN ARRIETA [address]     [place] [address]   Additional Information     Associated Reports   Priority and Order Details   ADT-Related Order Information    Encounter     View Encounter             Order Provider Info          Office phone Pager E-mail   Ordering User Liliane Churchill [phone] -- [email or web address]   Authorizing Provider Liliane Churchill [phone] -- --   Attending Provider SON Rowe [phone] -- --   Ambulatory referral to Occupational Therapy [[id number]]     Electronically signed by: Liliane Churchill MD on 07/15/19 1400 Status: Active   Ordering user: Liliane Churchill MD 07/15/19 1400 Ordering provider: Liliane Churchill MD   Authorized by: Liliane Churchill MD Ordering mode: Standard   Frequency:  07/15/19 -     Diagnoses  Lymphedema of both lower extremities [I89.0]  Decreased mobility [R26.89]   Questionnaire     Question Answer   Post Surgical? No   Eval and Treat Yes   Order Diagnosis: Lymphedema of both lower extremities [I89.0 (ICD-10-CM)]  Decreased mobility [R26.89 (ICD-10-CM)] CPT:                Electronically signed by: Liliane Churchill MD Lic # [id number] NPI: [id number]

## 2019-07-15 NOTE — PLAN OF CARE
placed call to [facility]  to check on status of referral.     was informed admissions coordinator Javier was away for lunch and will return in 30 min.     [facility]

## 2019-07-15 NOTE — PROGRESS NOTES
Ambulatory referral to Physical Therapy [[id number]] (Order [id number])   Outpatient Referral   Date and Time: 7/15/2019  2:00 PM Department: [facility] Medical Surgical Unit Acute Rel By/Authorizing: Liliane Churcihll MD   Dept Order Information     Date Department   7/15/2019 [facility] Medical Surgical Unit Acute   Order Information     Order Date/Time Release Date/Time Start Date/Time End Date/Time   07/15/19 02:00 PM None 7/15/2019 None   Order Details     Frequency Duration Priority Order Class   None None Routine Internal Referral   Quantity     Ordering Quantity   1   Quantity     Ordering Quantity Ordering Quantity   1 1   Order Details     Order ID   [id number]   Reprint Order Requisition     Ambulatory referral to Physical Therapy (Order #[id number]) on 7/15/19   Associated Diagnoses      ICD-10-CM ICD-9-CM   Lymphedema of both lower extremities     I89.0 457.1   Decreased mobility     R26.89 781.99   Order Questions     Question Answer Comment   Post Surgical? No    Eval and Treat Yes           Collection Information     Patient Details   MRN:[id number]   Name Gender Identity  N   Florian Arrieta Male 1955       Address Phone Email Aliases   [address] [place] [address] Home: [phone]   Work:    Cell: [phone]    [email or web address] FLORIAN ARRIETA      Inpatient Unit Inpatient Room Inpatient Bed    [facility] MEDICAL SURGICAL UNIT ACUTE K531 K531 A       PCP Allergies     Leon Barajas, DO No Known Allergies      Primary Visit Coverage     Payer Plan Sponsor Code Group Number Group Name   [facility] CHOICE  [id number]    Primary Visit Coverage subscriber     Subscriber ID Subscriber Name Subscriber Address   [id number] FLORIAN ARRIETA [address] [place] [address]   Additional Information     Associated Reports   Priority and Order Details   ADT-Related Order Information    Encounter     View Encounter             Order Provider Info         Office  phone Pager E-mail   Ordering User Liliane Churchill [phone] -- [email or web address]   Authorizing Provider Liliane Churchill [phone] -- --   Attending Provider SON Rowe [phone] -- --   Ambulatory referral to Physical Therapy [[id number]]     Electronically signed by: Liliane Churchill MD on 07/15/19 1400 Status: Active   Ordering user: Liliane Churchill MD 07/15/19 1400 Ordering provider: Liliane Churchill MD   Authorized by: Liliane Churchill MD Ordering mode: Standard   Frequency:  07/15/19 -     Diagnoses  Lymphedema of both lower extremities [I89.0]  Decreased mobility [R26.89]   Questionnaire     Question Answer   Post Surgical? No   Eval and Treat Yes   Order Diagnosis: Lymphedema of both lower extremities [I89.0 (ICD-10-CM)]  Decreased mobility [R26.89 (ICD-10-CM)] CPT:                Electronically signed by: Liliane Churchill MD Lic # [id number] NPI: [id number]

## 2019-07-15 NOTE — DISCHARGE SUMMARY
[facility]  General Surgery  Neuroendocrine Tumor Service  Discharge Summary      Patient Name: Jian Arrieta  MRN: [id number]  Admission Date: 6/27/2019  Hospital Length of Stay: 18 days  Discharge Date and Time:  07/15/2019 4:08 PM  Attending Physician: SON Rowe MD   Discharging Provider: Liliane Churchill MD  Primary Care Provider: Leon Barajas DO     HPI: 63 y.o. male with PMH morbid obesity (BMI 41), stage 1 hepatic fibrosis (biopsy negative for cirrhosis), chronic pancreatitis c/b large volume ascites requiring twice weekly paracenteses in addition to pancreatic pseudocyst, non-compliance with low salt diet/fluid restriction, DM insulin dependent (HgA1c 7.1 on 5/23/19), BLE lymphedema, CHF, pAF on Eliquis and Metoprolol, CAD s/p PCI x5 on Plavix with subsequent CABG x3v (~2017 at ; LIMA-LAD [patent], SVG-OM [patent], SVG-RCA[occluded; L->R collaterals from LAD to PDA]), HTN, HLD, BERHANE on CPAP, R knee OA, Charcot foot, normocytic anemia, who presented to [facility] 6/27/19 as a transfer from [facility] with large volume ascites 2/2 portal vein occlusion requiring IR procedure for recannalization. Patient initially presented to [facility] for worsening RLE pain and bilateral lower extremity lymphedema contributing to progressive immobility combined with wife and sister no longer being able to care for patient. Patient received vancomycin for concern for RLE cellulitis and also had US of BLE which were negative for DVT. Patient has been transferred to surgical service in anticipation of IR procedure tomorrow.     Procedure(s) (LRB):  ESOPHAGOGASTRODUODENOSCOPY (EGD) (N/A)     Hospital Course: Patient was admitted and underwent paracentesis, transhepatic portal venogram, pressure measurements, and venoplasty on 6/28 with IR. Patient experienced ELIEZER after procedure. Patient had episode of hypotension and hypoglycemia on 7/3 and was transferred to the ICU  for closer management and more strict intake and output measurements for his worsening renal function. He underwent US abdomen and kidneys for evaluation which showed mild amount of ascites, portal vein flow, and no hydronephrosis. He was started on midodrine and was seen by Cardiology as he also complained of chest discomfort. His metoprolol was decreased and he was started on imdur.GI was also consulted for concern for esophagitis as a cause for his chest discomfort as it typically occurred after emesis. He underwent EGD on 7/8 that showed no varices and gastric erosions with no active bleeding. His lymphedema was treated with wound care. Patient was able to tolerate a cardiac diet with fluid restriction of 1L without difficulty. He did not require repeat paracentesis while admitted. He was restarted on his home aldactone and lasix before discharge. His ELIEZER improved during his admission. He was evaluated by PT/OT and will follow-up as with outpatient PT/OT. He will follow-up with Dr. Rowe and his PCP for further management. He needs to complete a repeat US abdomen as an outpatient to assess for ascites.     Consults:   Consults (From admission, onward)        Status Ordering Provider     Inpatient consult to Cardiology-[facility]  Once     Provider:  Sabas Dang MD    Completed MONIKA JOYNER     Inpatient consult to Gastroenterology-[facility]  Once     Provider:  (Not yet assigned)    Completed IRAJ OSBORNE     Inpatient consult to Interventional Radiology  Once     Provider:  (Not yet assigned)    Completed GUANAKITO BLANK     Inpatient consult to Social Work/Case Management  Once     Provider:  (Not yet assigned)    Acknowledged ZAKIA MALHOTRA          Significant Diagnostic Studies: Labs:   CMP   Recent Labs   Lab 07/14/19  0622 07/15/19  0426    140   K 3.8 4.1   * 110   CO2 24 25   GLU 62* 71   BUN 36* 34*   CREATININE 2.8* 2.7*   CALCIUM 8.2* 8.4*   PROT 5.1*  5.4*   ALBUMIN 3.2* 3.3*   BILITOT 0.4 0.4   ALKPHOS 157* 149*   AST 32 29   ALT 43 41   ANIONGAP 6* 5*   ESTGFRAFRICA 26* 28*   EGFRNONAA 23* 24*    and CBC   Recent Labs   Lab 07/14/19  0622 07/15/19  0426   WBC 4.64 5.70   HGB 9.3* 9.8*   HCT 31.4* 32.4*    270       Pending Diagnostic Studies:     None        Final Active Diagnoses:    Diagnosis Date Noted POA    PRINCIPAL PROBLEM:  Portal hypertension [K76.6] 06/27/2019 Yes    Gastritis [K29.70]  No    Chronic acquired lymphedema [I89.0] 06/30/2019 Yes    Venous stasis ulcer of left lower leg with edema of left lower leg [I83.029, I83.892, L97.929, R60.9] 06/28/2019 Yes    Discharge planning issues [Z02.9] 06/25/2019 Not Applicable    Hepatic fibrosis [K74.0] 06/25/2019 Yes    Malnutrition of moderate degree [E44.0] 06/25/2019 Yes    Decreased mobility [R26.89] 06/11/2019 Yes    CHF (congestive heart failure) [I50.9] 06/05/2019 Yes    S/P abdominal paracentesis [Z98.890] 06/05/2019 Not Applicable    Alteration in skin integrity [R23.9] 05/23/2019 Yes    Venous stasis dermatitis of both lower extremities [I87.2] 05/23/2019 Yes    Stage 3 chronic kidney disease [N18.3] 05/23/2019 Yes    Fatty liver disease, nonalcoholic [K76.0] 05/09/2019 Yes    Hypertension associated with diabetes [E11.59, I10] 05/06/2019 Yes    Obesity hypoventilation syndrome [E66.2] 05/06/2019 Yes    Paroxysmal atrial fibrillation [I48.0] 03/16/2019 Yes    Anemia [D64.9] 03/15/2019 Yes    Other ascites [R18.8] 03/13/2019 Yes    ELIEZER (acute kidney injury) [N17.9] 03/13/2019 Yes    DM type 2 with diabetic peripheral neuropathy [E11.42] 02/04/2019 Yes    Hypoalbuminemia [E88.09] 02/04/2019 Yes    Chronic diastolic heart failure [I50.32] 01/29/2019 Yes    Lymphedema of both lower extremities [I89.0] 01/29/2019 Yes    Chronic pancreatitis [K86.1] 01/28/2019 Yes    Anasarca [R60.1] 01/28/2019 Yes    Sleep apnea [G47.30] 08/26/2015 Yes    Coronary artery disease  due to calcified coronary lesion [I25.10, I25.84] 05/08/2015 Yes      Problems Resolved During this Admission:      Discharged Condition: good    Disposition: Home or Self Care    Follow Up:  Follow-up Information     RANDELL Rowe MD On 8/13/2019.    Specialty:  General Surgery  Why:  Appt at 1200pm  Contact information:  [address]  [address]  [place] [address]  [phone]             Leon Barajas DO On 7/22/2019.    Specialty:  Internal Medicine  Why:  Appt at 1000am  Contact information:  [address]  [place] [address]  [phone]                 Patient Instructions:      US Abdomen Complete   Standing Status: Future Standing Exp. Date: 07/15/20     Order Specific Question Answer Comments   Reason for Exam: Ascites evaluation    May the Radiologist modify the order per protocol to meet the clinical needs of the patient? Yes      Ambulatory Referral to Wound Clinic   Referral Priority: Routine Referral Type: Consultation   Referral Reason: Specialty Services Required   Requested Specialty: Wound Care   Number of Visits Requested: 1     Ambulatory referral to Occupational Therapy   Referral Priority: Routine Referral Type: Physical Medicine   Referral Reason: Specialty Services Required   Requested Specialty: Occupational Therapy   Number of Visits Requested: 1     Ambulatory referral to Physical Therapy   Referral Priority: Routine Referral Type: Physical Medicine   Referral Reason: Specialty Services Required   Requested Specialty: Physical Therapy   Number of Visits Requested: 1     Diet diabetic, 2000 calorie, low sodium,  with 1 liter fluid restriction     Notify your health care provider if you experience any of the following:  persistent nausea and vomiting or diarrhea     Notify your health care provider if you experience any of the following:  temperature >100.4     Notify your health care provider if you experience any of the following:  difficulty breathing or  increased cough     Activity as tolerated     Medications:  Reconciled Home Medications:      Medication List      START taking these medications    isosorbide mononitrate 30 MG 24 hr tablet  Commonly known as:  IMDUR  Take 1 tablet (30 mg total) by mouth once daily.     metoclopramide HCl 10 MG tablet  Commonly known as:  REGLAN  Take 1 tablet (10 mg total) by mouth 3 (three) times daily before meals.     midodrine 2.5 MG Tab  Commonly known as:  PROAMATINE  Take 1 tablet (2.5 mg total) by mouth 3 (three) times daily with meals.     ramelteon 8 mg tablet  Commonly known as:  ROZEREM  Take 1 tablet (8 mg total) by mouth nightly as needed for Insomnia.        CHANGE how you take these medications    furosemide 20 MG tablet  Commonly known as:  LASIX  Take 1 tablet (20 mg total) by mouth 2 (two) times daily.  What changed:    · medication strength  · how much to take     insulin detemir U-100 100 unit/mL (3 mL) Inpn pen  Commonly known as:  LEVEMIR FLEXTOUCH  Inject 12 Units into the skin every evening.  What changed:    · medication strength  · how much to take  · when to take this     metoprolol succinate 25 MG 24 hr tablet  Commonly known as:  TOPROL-XL  Take 1 tablet (25 mg total) by mouth once daily.  What changed:    · medication strength  · how much to take        CONTINUE taking these medications    acetaminophen 325 MG tablet  Commonly known as:  TYLENOL  Take 2 tablets (650 mg total) by mouth every 4 (four) hours as needed.     albuterol-ipratropium 2.5 mg-0.5 mg/3 mL nebulizer solution  Commonly known as:  DUO-NEB  Take 3 mLs by nebulization every 4 (four) hours as needed for Wheezing. Rescue     apixaban 5 mg Tab  Commonly known as:  ELIQUIS  Take 1 tablet (5 mg total) by mouth 2 (two) times daily.     atorvastatin 40 MG tablet  Commonly known as:  LIPITOR  Take 1 tablet (40 mg total) by mouth once daily.     clopidogrel 75 mg tablet  Commonly known as:  PLAVIX  Take 1 tablet (75 mg total) by mouth once  daily.     cyanocobalamin (vitamin B-12) 500 mcg Subl  Place 1 tablet under the tongue every Monday.     insulin aspart U-100 100 unit/mL injection  Commonly known as:  NOVOLOG  Inject 4 Units into the skin 3 (three) times daily before meals.     lipase-protease-amylase 36,000-114,000- 180,000 unit Cpdr  Commonly known as:  CREON  Take 1 capsule by mouth 3 (three) times daily.     omeprazole 40 MG capsule  Commonly known as:  PRILOSEC  Take 40 mg by mouth once daily.     senna-docusate 8.6-50 mg 8.6-50 mg per tablet  Commonly known as:  PERICOLACE  Take 1 tablet by mouth 2 (two) times daily as needed for Constipation.     spironolactone 25 MG tablet  Commonly known as:  ALDACTONE  Take 25 mg by mouth 2 (two) times daily.     tamsulosin 0.4 mg Cap  Commonly known as:  FLOMAX  Take 1 capsule by mouth once daily. Pt has not started taking as of 2/27/19.        STOP taking these medications    albumin human 25% 25 % bottle     clindamycin 600 MG/50 ML D5W 600 mg/50 mL IVPB  Commonly known as:  CLEOCIN     ONETOUCH ULTRA TEST Strp  Generic drug:  blood sugar diagnostic            Liliane Churchill MD   PGY-2  General Surgery  Neuroendocrine Tumor Service  [facility]

## 2019-07-15 NOTE — PLAN OF CARE
Discharge rounds on patient. Discussed followup appointments, blue discharge folder, discharge nurse will go over home medications and reasons for medications and final discharge instructions. All patient/caregiver questions answered. Patient verbalized understanding.    TN met with patient. He was informed on discharge today. Pt states that he will call [facility] service to bring him home. Pt was informed on follow up appointments scheduled by TN. PT also aware that TN faxed OP PT/OT orders to [facility] and that they will reach out to him to schedule eval. He states verbal understanding and has no further questions.    Future Appointments   Date Time Provider Department Center   7/22/2019 10:00 AM Leon Barajas DO [facility] IM [place]   8/2/2019 11:20 AM Leon Barajas DO [facility] IM [place]   8/13/2019 12:00 PM SON Rowe MD [facility] TUMOR [facility]   8/26/2019 11:00 AM Jaime Carney III, MD [facility] CARDIO [place]        07/15/19 1513   Final Note   Assessment Type Final Discharge Note   Anticipated Discharge Disposition Home   What phone number can be called within the next 1-3 days to see how you are doing after discharge? [phone]   Hospital Follow Up  Appt(s) scheduled? Yes   Discharge plans and expectations educations in teach back method with documentation complete? Yes   Right Care Referral Info   Post Acute Recommendation Other   Referral Type OP Rehab   Facility Name [facility]

## 2019-07-15 NOTE — PLAN OF CARE
Problem: Occupational Therapy Goal  Goal: Occupational Therapy Goal  Goals to be met by: 7/29/2019    Patient will increase functional independence with ADLs by performing:    UE Dressing with Modified Real.  LE Dressing with Modified Real.  Toileting from toilet with Modified Real for hygiene and clothing management.   Supine to sit with Modified Real.  Step transfer with Modified Real  Toilet transfer to toilet with Modified Real.  Increased functional strength to WFL for ADLS.  Upper extremity exercise program 10 reps per handout, with supervision.     Outcome: Ongoing (interventions implemented as appropriate)  Patient making great improvements and would be better suited to D/C home /c OP PT/OT, if he has transportation.

## 2019-07-15 NOTE — PT/OT/SLP PROGRESS
Occupational Therapy   Treatment    Name: Jian Arrieta  MRN: [id number]  Admitting Diagnosis:  Portal hypertension  7 Days Post-Op    Recommendations:     Discharge Recommendations: Patient could D/C home /c OP PT/OT, if he has transportation   Discharge Equipment Recommendations: N/A  Barriers to discharge:  Inaccessible home environment, Decreased caregiver support    Assessment:   Patient making great improvements and would be better suited to D/C home /c OP PT/OT, if he has transportation.     Jian Arrieta is a 63 y.o. male with a medical diagnosis of Portal hypertension. Performance deficits affecting function are weakness, impaired endurance, impaired self care skills, impaired functional mobilty, gait instability, impaired balance, decreased ROM, pain, edema, impaired skin.     Rehab Prognosis:  Good; patient would benefit from acute skilled OT services to address these deficits and reach maximum level of function.       Plan:     Patient to be seen 5 x/week to address the above listed problems via self-care/home management, therapeutic activities, therapeutic exercises  · Plan of Care Expires: 08/04/19  · Plan of Care Reviewed with: patient    Subjective     Pain/Comfort:  · Pain Rating 1: 0/10  · Pain Rating Post-Intervention 1: 0/10    Objective:     Communicated with: nurseHailey prior to session.  Patient found up in chair with bed alarm, telemetry upon OT entry to room.    General Precautions: Standard, fall, hearing impaired   Orthopedic Precautions:N/A   Braces: N/A     Bed Mobility:    · N/A    Functional Mobility/Transfers:  · Patient completed Sit <> Stand Transfer with stand by assistance  with  rolling walker     Activities of Daily Living:  · Upper Body Dressing: stand by assistance    · Lower Body Dressing: stand by assistance      [facility] 6 Click ADL: 21    Treatment & Education:  Patient UIC and completed dsg tasks as above - SBA. Patient ambulated around unit ~500 ft /c SBA using RW.  Patient with some impaired safety awareness, but appears to be his personality (lifting legs during ambulation, turning RW around unsafely). Patient reports he has good set-up in garage to D/C home and feels he no longer needs SNF placement.      Patient left up in chair with all lines intact, call button in reach and nurse notifiedEducation:      GOALS:   Multidisciplinary Problems     Occupational Therapy Goals        Problem: Occupational Therapy Goal    Goal Priority Disciplines Outcome Interventions   Occupational Therapy Goal     OT, PT/OT Ongoing (interventions implemented as appropriate)    Description:  Goals to be met by: 7/29/2019    Patient will increase functional independence with ADLs by performing:    UE Dressing with Modified [place].  LE Dressing with Modified [place].  Toileting from toilet with Modified [place] for hygiene and clothing management.   Supine to sit with Modified [place].  Step transfer with Modified [place]  Toilet transfer to toilet with Modified [place].  Increased functional strength to WFL for ADLS.  Upper extremity exercise program 10 reps per handout, with supervision.                      Time Tracking:     OT Date of Treatment: 07/15/19  OT Start Time: 0945  OT Stop Time: 1026  OT Total Time (min): 41 min    Billable Minutes:Self Care/Home Management 11  Therapeutic Activity 30    AFIA Melendez  7/15/2019

## 2019-07-15 NOTE — PROGRESS NOTES
Ambulatory referral to Occupational Therapy [CGQ184] (Order [id number])   Outpatient Referral   Date and Time: 7/15/2019  2:00 PM Department: [facility] Rel By/Authorizing: Liliane Churchill MD   Dept Order Information     Date Department   7/15/2019 [facility]   Order Information     Order Date/Time Release Date/Time Start Date/Time End Date/Time   07/15/19 02:00 PM None 7/15/2019 None   Order Details     Frequency Duration Priority Order Class   None None Routine Internal Referral   Quantity     Ordering Quantity   1   Quantity     Ordering Quantity Ordering Quantity   1 1   Order Details     Order ID   [id number]   Reprint Order Requisition     Ambulatory referral to Occupational Therapy (Order #[id number]) on 7/15/19   Associated Diagnoses      ICD-10-CM ICD-9-CM   Lymphedema of both lower extremities     I89.0 457.1   Decreased mobility     R26.89 781.99   Order Questions     Question Answer Comment   Post Surgical? No    Eval and Treat Yes           Collection Information     Patient Details   MRN:[id number]   Name Gender Identity  N   Florian Arrieta Male 1955       Address Phone Email Aliases   [address] [place] [address] Home: [phone]   Work:    Cell: [phone]    [email or web address] FLORIAN ARRIETA      Inpatient Unit Inpatient Room Inpatient Bed    [facility] ACUTE K531 K531 A       PCP Allergies     Leon Barajas, DO No Known Allergies      Primary Visit Coverage     Payer Plan Sponsor Code Group Number Group Name   [facility] CHOICE  [id number]    Primary Visit Coverage subscriber     Subscriber ID Subscriber Name Subscriber Address   [id number] FLORIAN ARRIETA [address] [place] [address]   Additional Information     Associated Reports   Priority and Order Details   ADT-Related Order Information    Encounter     View Encounter             Order Provider Info          Office phone Pager E-mail   Ordering User Liliane Churchill [phone] -- [email or web address]   Authorizing Provider Liliane Churchill [phone] -- --   Attending Provider SON Rowe [phone] --   Ambulatory referral to Occupational Therapy [[id number]]     Electronically signed by: Liliane Churchill MD on 07/15/19 1400 Status: Active   Ordering user: Liliane Churchill MD 07/15/19 1400 Ordering provider: Liliane Churchill MD   Authorized by: Liliane Churchill MD Ordering mode: Standard   Frequency:  07/15/19 -     Diagnoses  Lymphedema of both lower extremities [I89.0]  Decreased mobility [R26.89]   Questionnaire     Question Answer   Post Surgical? No   Eval and Treat Yes   Order Diagnosis: Lymphedema of both lower extremities [I89.0 (ICD-10-CM)]  Decreased mobility [R26.89 (ICD-10-CM)] CPT:                Electronically signed by: Liliane Churchill MD Lic # [id number] NPI: [id number]

## 2019-07-16 ENCOUNTER — PATIENT MESSAGE (OUTPATIENT)
Dept: NEUROLOGY | Facility: HOSPITAL | Age: 64
End: 2019-07-16

## 2019-07-17 ENCOUNTER — TELEPHONE (OUTPATIENT)
Dept: NEUROLOGY | Facility: HOSPITAL | Age: 64
End: 2019-07-17

## 2019-07-17 NOTE — TELEPHONE ENCOUNTER
"Spoke with the patient's sister. She is looking for "regular" OT/PT orders not attached to the patient's lymphedema condition. Also, states that the wound care clinic for the patient's lymphedema is scheduling 6 weeks out and she wants to know if that is okay. Informed her that it is, but if she'd like to call around to other clinics in the area she is welcome to and gave numbers for the clinic at [facility] and [facility] for their clinics as requested.     Also informed that Dr. Rowe wanted her to see an internist and instead she was given an appointment with a family practice physician, informed her that they are one in the same, the family physician is just able to see younger children as well. Verbalized understanding.   "

## 2019-07-17 NOTE — TELEPHONE ENCOUNTER
----- Message from Steph Chamberlain sent at 7/17/2019  2:28 PM CDT -----  Contact: [phone] pt's sister Nena   Patient's sister called in requesting to speak with you. Patient prefers to speak with a nurse. Please advise.

## 2019-07-18 ENCOUNTER — TELEPHONE (OUTPATIENT)
Dept: REHABILITATION | Facility: HOSPITAL | Age: 64
End: 2019-07-18

## 2019-07-19 ENCOUNTER — HOSPITAL ENCOUNTER (EMERGENCY)
Facility: HOSPITAL | Age: 64
Discharge: HOME | End: 2019-07-20
Attending: EMERGENCY MEDICINE
Payer: COMMERCIAL

## 2019-07-19 VITALS
DIASTOLIC BLOOD PRESSURE: 62 MMHG | OXYGEN SATURATION: 96 % | HEIGHT: 67 IN | BODY MASS INDEX: 42.69 KG/M2 | RESPIRATION RATE: 18 BRPM | WEIGHT: 272 LBS | SYSTOLIC BLOOD PRESSURE: 113 MMHG | HEART RATE: 95 BPM | TEMPERATURE: 99 F

## 2019-07-19 DIAGNOSIS — S09.90XA: ICD-10-CM
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
## 2019-07-19 DIAGNOSIS — S01.01XA LACERATION OF SCALP, INITIAL ENCOUNTER: ICD-10-CM

## 2019-07-19 PROCEDURE — 93010 EKG 12-LEAD: ICD-10-PCS | Mod: ,,, | Performed by: INTERNAL MEDICINE

## 2019-07-19 PROCEDURE — 99284 PR EMERGENCY DEPT VISIT,LEVEL IV: ICD-10-PCS | Mod: 25,,, | Performed by: EMERGENCY MEDICINE

## 2019-07-19 PROCEDURE — 12001 RPR S/N/AX/GEN/TRNK 2.5CM/<: CPT

## 2019-07-19 PROCEDURE — 93005 ELECTROCARDIOGRAM TRACING: CPT | Mod: 59

## 2019-07-19 PROCEDURE — 99284 EMERGENCY DEPT VISIT MOD MDM: CPT | Mod: 25

## 2019-07-19 PROCEDURE — 93010 ELECTROCARDIOGRAM REPORT: CPT | Mod: ,,, | Performed by: INTERNAL MEDICINE

## 2019-07-19 PROCEDURE — 12001 RPR S/N/AX/GEN/TRNK 2.5CM/<: CPT | Mod: ,,, | Performed by: EMERGENCY MEDICINE

## 2019-07-19 PROCEDURE — 12001 PR RESUPERF WND BODY <2.5CM: ICD-10-PCS | Mod: ,,, | Performed by: EMERGENCY MEDICINE

## 2019-07-19 PROCEDURE — 99284 EMERGENCY DEPT VISIT MOD MDM: CPT | Mod: 25,,, | Performed by: EMERGENCY MEDICINE

## 2019-07-19 RX ORDER — APIXABAN 5 MG/1
TABLET, FILM COATED ORAL
Qty: 180 TABLET | Refills: 1 | OUTPATIENT
Start: 2019-07-19

## 2019-07-19 RX ORDER — LIDOCAINE HYDROCHLORIDE AND EPINEPHRINE 10; 10 MG/ML; UG/ML
1 INJECTION, SOLUTION INFILTRATION; PERINEURAL
Status: DISCONTINUED | OUTPATIENT
Start: 2019-07-19 | End: 2019-07-19

## 2019-07-19 RX ORDER — LIDOCAINE HYDROCHLORIDE AND EPINEPHRINE 10; 10 MG/ML; UG/ML
10 INJECTION, SOLUTION INFILTRATION; PERINEURAL ONCE
Status: DISCONTINUED | OUTPATIENT
Start: 2019-07-19 | End: 2019-07-19

## 2019-07-19 NOTE — ED NOTES
LOC: The patient is awake, alert and aware of environment with an appropriate affect, the patient is oriented x 3 and speaking appropriately.  APPEARANCE: Patient resting comfortably and in no acute distress, patient is clean and well groomed, patient's clothing is properly fastened.  SKIN: The skin is warm and dry, color consistent with ethnicity, patient has normal skin turgor and moist mucus membranes  MUSCULOSKELETAL: Patient moving all extremities spontaneously.  RESPIRATORY: Airway is open and patent, respirations are spontaneous, patient has a normal effort and rate, no accessory muscle use noted  ABDOMEN: Soft and non tender to palpation, no distention noted    Patient states he was getting into a truck and fell backwards, patient states he landed on his bottom first than hit his head, patient has small 1cm laceration to the back of his head, patient denies loc, able to voice all needs, cardiac monitoring in progress, will continue to monitor

## 2019-07-19 NOTE — ED PROVIDER NOTES
Encounter Date: 7/19/2019    SCRIBE #1 NOTE: I, Binta Levine, am scribing for, and in the presence of,  Dr. Munoz. I have scribed the following portions of the note - the EKG reading. Other sections scribed: HPI, ROS.       History     Chief Complaint   Patient presents with    Fall     fell today getting into truck- fell backwards striking head  No LOC. Skin tear left arm. On Eliquis     Time patient was seen by the provider: 2:11 PM      The patient is a 63 y.o. male with co-morbidities including alcohol abuse, former smoker, type 2 diabetes, chronic combined systolic and diastolic heart failure, CAD, lymphedema of BLE, morbid obesity  who presents to the ED with a complaint of head trauma. Patient reports slip and fall while trying to get in his van. He landed on his butt and then his head and incurred a skin tear to his LUE and laceration to occipital scalp. No LOC. He was unable to get up on his own. The fall was witnessed by his friend. No nausea, vomiting or confusion.     The history is provided by the patient, a friend and medical records.     Review of patient's allergies indicates:  No Known Allergies  Past Medical History:   Diagnosis Date    Alcohol abuse     Anasarca 1/28/2019    Arthropathy associated with neurological disorder 9/2/2015    Atherosclerosis     Charcot foot due to diabetes mellitus     Chronic combined systolic and diastolic heart failure 01/29/2019 1-28-19 Left VentricleModerate decreased ejection fraction at 30%. Normal left ventricular cavity size. Normal wall thickness observed. Grade I (mild) left ventricular diastolic dysfunction consistent with impaired relaxation. Normal left atrial pressure. Moderate, global hypokinesis(see wall scoring diagram). Right VentricleNormal cavity size, wall thickness and ejection fraction. Wall motion n    Chronic pancreatitis 1/28/2019    Colon polyps     approx 5 yrs ago    Coronary artery disease due to calcified coronary lesion  05/08/2015    5 stents on ASA      Diabetic polyneuropathy associated with type 2 diabetes mellitus 9/2/2015    Diverticulosis 1/28/2019    DM type 2 with diabetic peripheral neuropathy 2/4/2019    Encounter for blood transfusion     Essential hypertension 1/28/2019    Former smoker 8/26/2015    Healed ulcer of left foot on examination 6/20/2017    Hydrocele     approx 1.5 yrs ago    Hypoalbuminemia 2/4/2019    Lymphedema of both lower extremities 1/29/2019    Mixed hyperlipidemia 5/8/2015    Morbid obesity with BMI of 50.0-59.9, adult 5/8/2015    Onychomycosis of multiple toenails with type 2 diabetes mellitus and peripheral neuropathy 6/20/2017    Perianal cyst     approx 2 yrs ago    Pseudocyst of pancreas 1/28/2019 1-28-19 Liver has a cirrhotic morphology with no focal lesions.  Significant interval increase in ascites when compared to prior exam which may account for patient's abdominal distension.  Hypodense air-fluid collection along the body of the pancreas which is slightly smaller when compared to prior CT.  Findings may relate to pancreatic necrosis with pancreatic pseudocysts with infected pseudocyst    Skin cancer     skin cancer    Sleep apnea 8/26/2015    Status post bariatric surgery 1/11/2016    Type 2 diabetes mellitus, with long-term current use of insulin 5/8/2015     Past Surgical History:   Procedure Laterality Date    ANGIOGRAM, CORONARY ARTERY Right 3/20/2019    Performed by [facility] at [facility] CATH LAB    ANGIOGRAM-PV STENT N/A 6/28/2019    Performed by [facility] Provider at [facility] OR    ANGIOPLASTY      total x5 stents    Bypass graft study  3/20/2019    Performed by [facility] at [facility] CATH LAB    COLONOSCOPY N/A 10/6/2015    Performed by Shekhar Richards MD at [facility] ENDO (2ND FLR)    CORONARY ARTERY BYPASS GRAFT  2017    x3    CYST REMOVAL      ESOPHAGOGASTRODUODENOSCOPY (EGD) N/A 7/8/2019    Performed by Huan Brumfield MD at [facility] ENDO     GASTRECTOMY      GASTRECTOMY-SLEEVE-LAPAROSCOPIC - 31641 N/A 2015    Performed by [facility]heal [facility]avicenc[facility] Jr[facility] OR 2ND FLR    KNEE ARTHROSCOPY      perianal surgery      perianal cyst removed    pleurx N/A 2019    Performed by [facility] Provider at [facility] OR 2ND FLR     Family History   Problem Relation Age of Onset    Cancer Mother     Cancer Father     Heart disease Father     Obesity Sister     Parkinsonism Brother     No Known Problems Paternal Grandmother     Cancer Paternal Grandfather     Cancer Brother     Diabetes Maternal Grandmother     Stroke Maternal Grandfather     Cirrhosis Neg Hx      Social History     Tobacco Use    Smoking status: Former Smoker     Packs/day: 2.00     Years: 30.00     Pack years: 60.00     Types: Cigarettes     Last attempt to quit: 2005     Years since quittin.4    Smokeless tobacco: Never Used   Substance Use Topics    Alcohol use: No     Comment: started ~, reports 1 shot daily, max 3 shots daily, vague about alcohol consumption. Last drink 2018    Drug use: No     Review of Systems   Constitutional: Negative for fever.   HENT: Negative for sore throat.    Eyes: Negative.    Respiratory: Negative for shortness of breath.    Cardiovascular: Negative for chest pain.   Gastrointestinal: Negative for nausea.   Endocrine: Negative.    Genitourinary: Negative for dysuria.   Musculoskeletal: Negative for back pain.   Skin: Positive for wound (skin tear to LUE, laceration to occipital scalp). Negative for rash.   Allergic/Immunologic: Negative.    Neurological: Negative for weakness.   Hematological: Does not bruise/bleed easily.   Psychiatric/Behavioral: Negative.    All other systems reviewed and are negative.      Physical Exam     Initial Vitals [19 1336]   BP Pulse Resp Temp SpO2   109/64 80 18 99.4 °F (37.4 °C) 96 %      MAP       --         Physical Exam    Nursing note and vitals reviewed.  Constitutional: He  "appears well-developed and well-nourished.   HENT:   Head: Normocephalic.   1.5 cm laceration to posterior scalp   Eyes: EOM are normal.   Neck: Normal range of motion. Neck supple.   nontender   Cardiovascular: Normal rate and regular rhythm.   Abdominal: Soft.   Musculoskeletal: Normal range of motion.   Lymphedema to bilateral lower exremities   Neurological: He is alert and oriented to person, place, and time. No cranial nerve deficit. GCS score is 15. GCS eye subscore is 4. GCS verbal subscore is 5. GCS motor subscore is 6.   Skin: No rash noted.   Psychiatric: He has a normal mood and affect.         ED Course   Lac Repair  Date/Time: 7/19/2019 3:50 PM  Performed by: Joyce Munoz MD  Authorized by: Joyce Munoz MD   Consent Done: Yes  Consent: Verbal consent obtained.  Risks and benefits: risks, benefits and alternatives were discussed  Consent given by: patient  Patient understanding: patient states understanding of the procedure being performed  Patient identity confirmed: verbally with patient  Time out: Immediately prior to procedure a "time out" was called to verify the correct patient, procedure, equipment, support staff and site/side marked as required.  Body area: head/neck  Location details: scalp  Laceration length: 1.5 cm  Foreign bodies: no foreign bodies  Tendon involvement: none  Nerve involvement: none  Vascular damage: no  Patient sedated: no  Irrigation solution: tap water  Amount of cleaning: standard  Debridement: none  Degree of undermining: none  Skin closure: staples  Number of sutures: 3  Approximation difficulty: simple  Dressing: antibiotic ointment  Patient tolerance: Patient tolerated the procedure well with no immediate complications        Labs Reviewed - No data to display  EKG Readings: (Independently Interpreted)   13:53 Sinus tachycardia at a rate of 104. Occasional PVC. Left anterior fascicular block. Paroxysmal Afib.      ECG Results          EKG 12-lead " (Final result)  Result time 07/20/19 11:58:40    Final result by Interface, Lab In [facility] (07/20/19 11:58:40)                 Narrative:    Test Reason : S09.90XA,    Vent. Rate : 104 BPM     Atrial Rate : 104 BPM     P-R Int : 166 ms          QRS Dur : 084 ms      QT Int : 380 ms       P-R-T Axes : 061 -48 075 degrees     QTc Int : 499 ms    Sinus tachycardia with occasional Premature ventricular complexes and   Fusion complexes  Left anterior fascicular block  Possible Anterior infarct (cited on or before 04-JUL-2019)  Abnormal ECG  When compared with ECG of 05-JUL-2019 13:44,  Premature ventricular complexes are now Present  Confirmed by Dalila WEAVER, Rubina ([id number]) on 7/20/2019 11:58:25 AM    Referred By: MEL   SELF           Confirmed By:Rubina Conner MD                            Imaging Results          X-Ray Chest AP Portable (Final result)  Result time 07/19/19 15:07:31    Final result by Dolly Freed MD (07/19/19 15:07:31)                 Impression:      Normal exam.      Electronically signed by: Dolly Freed MD  Date:    07/19/2019  Time:    15:07             Narrative:    EXAMINATION:  XR CHEST AP PORTABLE    CLINICAL HISTORY:  fall, trauma;    TECHNIQUE:  Single frontal view of the chest was performed.    COMPARISON:  10/05/2019    FINDINGS:  Right internal jugular central venous line terminates in the superior vena cava.    The lungs are symmetrically inflated with no mass, nodule, pneumothorax, airspace consolidation or pleural effusion.  The cardiomediastinal silhouette, osseous and soft tissue structures are normal.  The patient has undergone median sternotomy.                               CT Head Without Contrast (Final result)  Result time 07/19/19 15:06:03    Final result by Leon Hall MD (07/19/19 15:06:03)                 Impression:      No acute large vascular territory infarct or intracranial hemorrhage identified.      Electronically signed by: Leon Hall  MD  Date:    07/19/2019  Time:    15:06             Narrative:    EXAMINATION:  CT HEAD WITHOUT CONTRAST    CLINICAL HISTORY:  Headache, post trauma;    TECHNIQUE:  Low dose axial CT images obtained throughout the head without intravenous contrast. Sagittal and coronal reconstructions were performed.    COMPARISON:  Head CT 06/25/2019    FINDINGS:  Intracranial compartment:    Mild generalized cerebral volume loss.  Ventricles are midline without distortion by mass effect or acute hydrocephalus.  No extra-axial blood or fluid collections.    Grossly stable mild degree of supratentorial white matter chronic small vessel ischemic change.  No definite new focal areas of abnormal parenchymal attenuation.  No parenchymal mass, hemorrhage, edema or major vascular distribution infarct.  Skull base atherosclerotic vascular calcifications noted.    Skull/extracranial contents (limited evaluation): No fracture. Mastoid air cells and paranasal sinuses are essentially clear.  Imaged portions of the orbits are within normal limits.                                 Medical Decision Making:   History:   Old Medical Records: I decided to obtain old medical records.  Old Records Summarized: records from clinic visits and records from previous admission(s).  Initial Assessment:   Mechanical fall with laceration to post scalp  No loc, no signs of concussion  No other pain  Independently Interpreted Test(s):   I have ordered and independently interpreted EKG Reading(s) - see prior notes  Clinical Tests:   Radiological Study: Reviewed and Ordered  Medical Tests: Ordered and Reviewed  ED Management:  Mechanical fall  Neg head ct and cxr  No other symptoms  Lac was repaired  Pt declined tetanus            Scribe Attestation:   Scribe #1: I performed the above scribed service and the documentation accurately describes the services I performed. I attest to the accuracy of the note.               Clinical Impression:       ICD-10-CM ICD-9-CM    1. Fall, initial encounter W19.XXXA E888.9   2. Head injury, acute S09.90XA 959.01   3. Laceration of scalp, initial encounter S01.01XA 873.0         Disposition:   Disposition: Discharged                        Joyce Munoz MD  07/20/19 1901       Joyce Munoz MD  07/20/19 1902

## 2019-07-19 NOTE — DISCHARGE INSTRUCTIONS
You need your staples removed in 12-14 days. You can return here or go to your primary doctor to have them removed.

## 2019-07-19 NOTE — PROVIDER PROGRESS NOTES - EMERGENCY DEPT.
Encounter Date: 7/19/2019    ED Physician Progress Notes         EKG - STEMI Decision  Initial Reading: No STEMI present.  Response: No Action Needed.

## 2019-07-22 ENCOUNTER — OFFICE VISIT (OUTPATIENT)
Dept: INTERNAL MEDICINE | Facility: CLINIC | Age: 64
End: 2019-07-22
Payer: MEDICARE

## 2019-07-22 VITALS
DIASTOLIC BLOOD PRESSURE: 60 MMHG | SYSTOLIC BLOOD PRESSURE: 110 MMHG | WEIGHT: 264.56 LBS | BODY MASS INDEX: 41.52 KG/M2 | HEIGHT: 67 IN | HEART RATE: 92 BPM | RESPIRATION RATE: 18 BRPM | TEMPERATURE: 99 F

## 2019-07-22 DIAGNOSIS — D64.9 ANEMIA, UNSPECIFIED TYPE: ICD-10-CM

## 2019-07-22 DIAGNOSIS — I48.0 PAROXYSMAL ATRIAL FIBRILLATION: ICD-10-CM

## 2019-07-22 DIAGNOSIS — E11.69 HYPERLIPIDEMIA ASSOCIATED WITH TYPE 2 DIABETES MELLITUS: ICD-10-CM

## 2019-07-22 DIAGNOSIS — R18.8 OTHER ASCITES: ICD-10-CM

## 2019-07-22 DIAGNOSIS — K76.6 PORTAL HYPERTENSION: ICD-10-CM

## 2019-07-22 DIAGNOSIS — I15.2 HYPERTENSION ASSOCIATED WITH DIABETES: ICD-10-CM

## 2019-07-22 DIAGNOSIS — E11.42 DIABETIC POLYNEUROPATHY ASSOCIATED WITH TYPE 2 DIABETES MELLITUS: ICD-10-CM

## 2019-07-22 DIAGNOSIS — N18.4 CKD (CHRONIC KIDNEY DISEASE), STAGE IV: ICD-10-CM

## 2019-07-22 DIAGNOSIS — E78.5 HYPERLIPIDEMIA ASSOCIATED WITH TYPE 2 DIABETES MELLITUS: ICD-10-CM

## 2019-07-22 DIAGNOSIS — E66.2 OBESITY HYPOVENTILATION SYNDROME: ICD-10-CM

## 2019-07-22 DIAGNOSIS — E66.01 OBESITY, CLASS III, BMI 40-49.9 (MORBID OBESITY): ICD-10-CM

## 2019-07-22 DIAGNOSIS — I25.10 CORONARY ARTERY DISEASE DUE TO CALCIFIED CORONARY LESION: ICD-10-CM

## 2019-07-22 DIAGNOSIS — I89.0 LYMPHEDEMA OF BOTH LOWER EXTREMITIES: ICD-10-CM

## 2019-07-22 DIAGNOSIS — I81 PORTAL VEIN OBSTRUCTION: Primary | ICD-10-CM

## 2019-07-22 DIAGNOSIS — I70.90 ATHEROSCLEROSIS: ICD-10-CM

## 2019-07-22 DIAGNOSIS — I25.84 CORONARY ARTERY DISEASE DUE TO CALCIFIED CORONARY LESION: ICD-10-CM

## 2019-07-22 DIAGNOSIS — K86.1 CHRONIC PANCREATITIS, UNSPECIFIED PANCREATITIS TYPE: ICD-10-CM

## 2019-07-22 DIAGNOSIS — E11.59 HYPERTENSION ASSOCIATED WITH DIABETES: ICD-10-CM

## 2019-07-22 PROCEDURE — 3078F PR MOST RECENT DIASTOLIC BLOOD PRESSURE < 80 MM HG: ICD-10-PCS | Mod: CPTII,S$GLB,, | Performed by: INTERNAL MEDICINE

## 2019-07-22 PROCEDURE — 3074F SYST BP LT 130 MM HG: CPT | Mod: CPTII,S$GLB,, | Performed by: INTERNAL MEDICINE

## 2019-07-22 PROCEDURE — 99215 PR OFFICE/OUTPT VISIT, EST, LEVL V, 40-54 MIN: ICD-10-PCS | Mod: S$GLB,,, | Performed by: INTERNAL MEDICINE

## 2019-07-22 PROCEDURE — 99215 OFFICE O/P EST HI 40 MIN: CPT | Mod: S$GLB,,, | Performed by: INTERNAL MEDICINE

## 2019-07-22 PROCEDURE — 3045F PR MOST RECENT HEMOGLOBIN A1C LEVEL 7.0-9.0%: CPT | Mod: CPTII,S$GLB,, | Performed by: INTERNAL MEDICINE

## 2019-07-22 PROCEDURE — 99499 RISK ADDL DX/OHS AUDIT: ICD-10-PCS | Mod: S$GLB,,, | Performed by: INTERNAL MEDICINE

## 2019-07-22 PROCEDURE — 99499 UNLISTED E&M SERVICE: CPT | Mod: S$GLB,,, | Performed by: INTERNAL MEDICINE

## 2019-07-22 PROCEDURE — 3074F PR MOST RECENT SYSTOLIC BLOOD PRESSURE < 130 MM HG: ICD-10-PCS | Mod: CPTII,S$GLB,, | Performed by: INTERNAL MEDICINE

## 2019-07-22 PROCEDURE — 3078F DIAST BP <80 MM HG: CPT | Mod: CPTII,S$GLB,, | Performed by: INTERNAL MEDICINE

## 2019-07-22 PROCEDURE — 3008F PR BODY MASS INDEX (BMI) DOCUMENTED: ICD-10-PCS | Mod: CPTII,S$GLB,, | Performed by: INTERNAL MEDICINE

## 2019-07-22 PROCEDURE — 99999 PR PBB SHADOW E&M-EST. PATIENT-LVL III: ICD-10-PCS | Mod: PBBFAC,,, | Performed by: INTERNAL MEDICINE

## 2019-07-22 PROCEDURE — 3008F BODY MASS INDEX DOCD: CPT | Mod: CPTII,S$GLB,, | Performed by: INTERNAL MEDICINE

## 2019-07-22 PROCEDURE — 99999 PR PBB SHADOW E&M-EST. PATIENT-LVL III: CPT | Mod: PBBFAC,,, | Performed by: INTERNAL MEDICINE

## 2019-07-22 PROCEDURE — 3045F PR MOST RECENT HEMOGLOBIN A1C LEVEL 7.0-9.0%: ICD-10-PCS | Mod: CPTII,S$GLB,, | Performed by: INTERNAL MEDICINE

## 2019-07-22 NOTE — PROGRESS NOTES
"Subjective:       Patient ID: Jian Arrieta is a 63 y.o. male.    Chief Complaint: Follow-up (hospital- fell getting into van) and Medication Refill    HPI   Pt with T2DM with neuropathy, PAF, HTN, HLD, CKD IV, Atherosclerosis, Anemia, Morbid Obesity, Chronic LE lymphedema, BERHANE, Ascites/portal HTN, Chronic pancreatitis is here for hospital f/u from 6/27/19-7/15/19. Per records, "Pt presented to [facility] 6/27/19 as a transfer from [facility] with large volume ascites 2/2 portal vein occlusion requiring IR procedure for recannalization. Patient initially presented to [facility] for worsening RLE pain and bilateral lower extremity lymphedema contributing to progressive immobility combined with wife and sister no longer being able to care for patient. Patient received vancomycin for concern for RLE cellulitis and also had US of BLE which were negative for DVT. Patient has been transferred to surgical service in anticipation of IR procedure tomorrow.     Hospital Course: Patient was admitted and underwent paracentesis, transhepatic portal venogram, pressure measurements, and venoplasty on 6/28 with IR. Patient experienced ELIEZER after procedure. Patient had episode of hypotension and hypoglycemia on 7/3 and was transferred to the ICU for closer management and more strict intake and output measurements for his worsening renal function. He underwent US abdomen and kidneys for evaluation which showed mild amount of ascites, portal vein flow, and no hydronephrosis. He was started on midodrine and was seen by Cardiology as he also complained of chest discomfort. His metoprolol was decreased and he was started on imdur.GI was also consulted for concern for esophagitis as a cause for his chest discomfort as it typically occurred after emesis. He underwent EGD on 7/8 that showed no varices and gastric erosions with no active bleeding. His lymphedema was treated with wound care. Patient was able to tolerate " "a cardiac diet with fluid restriction of 1L without difficulty. He did not require repeat paracentesis while admitted. He was restarted on his home aldactone and lasix before discharge. His ELIEZER improved during his admission. He was evaluated by PT/OT and will follow-up as with outpatient PT/OT."    Since discharge pt has not required another paracentesis. His leg swelling is stable. Pt will be starting OP PT for strength training.   Review of Systems   Constitutional: Positive for fatigue. Negative for activity change, appetite change, chills, diaphoresis, fever and unexpected weight change.   HENT: Negative for congestion, mouth sores, postnasal drip, rhinorrhea, sinus pressure, sneezing, sore throat, trouble swallowing and voice change.    Eyes: Negative for discharge, itching and visual disturbance.   Respiratory: Negative for cough, chest tightness, shortness of breath and wheezing.    Cardiovascular: Negative for chest pain, palpitations and leg swelling.   Gastrointestinal: Negative for abdominal pain, blood in stool, constipation, diarrhea, nausea and vomiting.   Endocrine: Negative for cold intolerance and heat intolerance.   Genitourinary: Negative for difficulty urinating, dysuria, flank pain, hematuria and urgency.   Musculoskeletal: Negative for arthralgias, back pain, myalgias and neck pain.   Skin: Negative for rash and wound.   Allergic/Immunologic: Negative for environmental allergies and food allergies.   Neurological: Positive for weakness. Negative for dizziness, tremors, seizures, syncope and headaches.   Hematological: Negative for adenopathy. Does not bruise/bleed easily.   Psychiatric/Behavioral: Negative for confusion, sleep disturbance and suicidal ideas. The patient is not nervous/anxious.        Objective:      Physical Exam   Constitutional: He is oriented to person, place, and time. He appears well-developed and well-nourished. No distress.   HENT:   Head: Normocephalic and atraumatic. "   Right Ear: External ear normal.   Left Ear: External ear normal.   Nose: Nose normal.   Mouth/Throat: Oropharynx is clear and moist. No oropharyngeal exudate.   Eyes: Pupils are equal, round, and reactive to light. Conjunctivae and EOM are normal. Right eye exhibits no discharge. Left eye exhibits no discharge. No scleral icterus.   Neck: Normal range of motion. Neck supple. No JVD present. No thyromegaly present.   Cardiovascular: Normal rate, regular rhythm, normal heart sounds and intact distal pulses.   No murmur heard.  Pulmonary/Chest: Effort normal and breath sounds normal. No respiratory distress. He has no wheezes. He has no rales.   Abdominal: Soft. Bowel sounds are normal. He exhibits no distension. There is no tenderness. There is no guarding.   Musculoskeletal: He exhibits edema (3+ in B/L LE's, no signs of infection ).   Lymphadenopathy:     He has no cervical adenopathy.   Neurological: He is alert and oriented to person, place, and time.   Skin: Skin is warm and dry. No rash noted. He is not diaphoretic. No pallor.   Psychiatric: He has a normal mood and affect. Judgment normal.       Assessment:       1. Portal vein obstruction    2. Portal hypertension    3. Other ascites    4. Diabetic polyneuropathy associated with type 2 diabetes mellitus    5. Paroxysmal atrial fibrillation    6. Coronary artery disease due to calcified coronary lesion    7. Hypertension associated with diabetes    8. Hyperlipidemia associated with type 2 diabetes mellitus    9. CKD (chronic kidney disease), stage IV    10. Obesity, Class III, BMI 40-49.9 (morbid obesity)    11. Lymphedema of both lower extremities    12. Anemia, unspecified type    13. Obesity hypoventilation syndrome    14. Atherosclerosis    15. Chronic pancreatitis, unspecified pancreatitis type        Plan:    1. Ascites/portal HTN- significant improvement s/p venoplasty for portal vein occlusion        Followed by Hepatology    2. T2DM with  neuropathy/CKD- last A1C of 7.1(5/19)<--7.4(3/19)<--6.9(1/19)       Continue Levemir/Novolog   3. PAF- stable, CPT   5. CAD- stable, CPT   6. HTN- stable, CPT   7. HLD- stable   8. CKD IV- stable   9. Morbid Obesity- pt advised on proper diet/exercise for weight loss  10. Chronic LE lymphedema with stasis dermatitis- stable  11. NC/NC Anemia- stable  12. BERHANE- pt not using his CPAP  13. Aortic atherosclerosis- stable  14. Chronic pancreatitis- stable   15. F/u in 4 months        Over 1/2 of 40 minute visit spent reviewing pt's medical records, education/discussion of pt's medical conditions and medical management

## 2019-07-23 ENCOUNTER — TELEPHONE (OUTPATIENT)
Dept: FAMILY MEDICINE | Facility: HOSPITAL | Age: 64
End: 2019-07-23

## 2019-07-23 PROBLEM — N18.4 CKD (CHRONIC KIDNEY DISEASE), STAGE IV: Status: ACTIVE | Noted: 2019-07-23

## 2019-07-23 PROBLEM — E66.01 OBESITY, CLASS III, BMI 40-49.9 (MORBID OBESITY): Status: ACTIVE | Noted: 2019-07-23

## 2019-07-23 PROBLEM — N18.30 STAGE 3 CHRONIC KIDNEY DISEASE: Status: RESOLVED | Noted: 2019-05-23 | Resolved: 2019-07-23

## 2019-07-23 PROBLEM — E66.813 OBESITY, CLASS III, BMI 40-49.9 (MORBID OBESITY): Status: ACTIVE | Noted: 2019-07-23

## 2019-07-23 PROBLEM — N17.9 AKI (ACUTE KIDNEY INJURY): Status: RESOLVED | Noted: 2019-03-13 | Resolved: 2019-07-23

## 2019-07-23 NOTE — TELEPHONE ENCOUNTER
Called pharmacy and approved basaglar instead of levemir as patient was discharged with levemir prescription. I defer further medication changes to his PCP, Dr. Barajas.     Liliane Churchill MD  PGY-2  [facility]  07/23/2019      ----- Message from Ligia Hobbs PharmD sent at 7/23/2019  7:56 AM CDT -----  Good morning. A prescription was sent to the [facility] outpatient pharmacy for Jian Arrieta ([id number]) for Levemir.  The insurance does not cover Levemir; they prefer that the patient use Basaglar.  Is it okay to change the Levemir to Basaglar?  If you have any additional questions, please call the pharmacy at [phone].    Thanks,  Ligia Hobbs, CullenD

## 2019-07-31 ENCOUNTER — TELEPHONE (OUTPATIENT)
Dept: WOUND CARE | Facility: HOSPITAL | Age: 64
End: 2019-07-31

## 2019-08-08 ENCOUNTER — HOSPITAL ENCOUNTER (OUTPATIENT)
Dept: WOUND CARE | Facility: HOSPITAL | Age: 64
Discharge: HOME | End: 2019-08-08
Attending: SURGERY
Payer: COMMERCIAL

## 2019-08-08 VITALS
TEMPERATURE: 98 F | SYSTOLIC BLOOD PRESSURE: 118 MMHG | HEIGHT: 67 IN | WEIGHT: 264 LBS | BODY MASS INDEX: 41.44 KG/M2 | DIASTOLIC BLOOD PRESSURE: 63 MMHG | HEART RATE: 94 BPM

## 2019-08-08 DIAGNOSIS — L03.116 CELLULITIS OF LEFT LEG: Primary | ICD-10-CM

## 2019-08-08 DIAGNOSIS — I87.2 VENOUS STASIS DERMATITIS, UNSPECIFIED LATERALITY: ICD-10-CM

## 2019-08-08 DIAGNOSIS — L03.116 CELLULITIS AND ABSCESS OF LEFT LEG: ICD-10-CM

## 2019-08-08 DIAGNOSIS — L02.416 CELLULITIS AND ABSCESS OF LEFT LEG: ICD-10-CM

## 2019-08-08 DIAGNOSIS — L03.115 CELLULITIS OF RIGHT LEG: ICD-10-CM

## 2019-08-08 PROCEDURE — 99205 OFFICE O/P NEW HI 60 MIN: CPT | Mod: 27,25

## 2019-08-08 PROCEDURE — 29581 APPL MULTLAYER CMPRN SYS LEG: CPT

## 2019-08-08 PROCEDURE — 99204 OFFICE O/P NEW MOD 45 MIN: CPT

## 2019-08-08 PROCEDURE — 99203 PR OFFICE/OUTPT VISIT, NEW, LEVL III, 30-44 MIN: ICD-10-PCS | Mod: ,,, | Performed by: SURGERY

## 2019-08-08 PROCEDURE — 99203 OFFICE O/P NEW LOW 30 MIN: CPT | Mod: ,,, | Performed by: SURGERY

## 2019-08-08 RX ORDER — AMOXICILLIN AND CLAVULANATE POTASSIUM 875; 125 MG/1; MG/1
1 TABLET, FILM COATED ORAL 2 TIMES DAILY
Qty: 20 TABLET | Refills: 0 | Status: SHIPPED | OUTPATIENT
Start: 2019-08-08 | End: 2019-08-19

## 2019-08-08 NOTE — PROGRESS NOTES
"[facility]                Progress Note    Subjective:       Patient ID: Jian Arrieta is a 63 y.o. male.    Chief Complaint: Non-healing Wound    8/8/2019: Pt presents to wound care clinic referred from [facility]. History of T2DM with neuropathy, PAF, HTN, HLD, CKD IV, Atherosclerosis, Anemia, Morbid Obesity, Chronic LE lymphedema, BERHANE, Ascites/portal HTN, Chronic pancreatitis. Per records, "Pt presented to [facility] 6/27/19 as a transfer from [facility] with large volume ascites 2/2 portal vein occlusion requiring IR procedure for recannalization." Patient initially presented to [facility] for worsening RLE pain and bilateral lower extremity lymphedema contributing to progressive immobility combined with wife and sister no longer being able to care for patient. Patient received vancomycin for concern for RLE cellulitis and also had US of BLE which were negative for DVT. New admit for Dr. Jeansonne. Pedal pulses and bilateral lower extremities edema +3 present. Bilateral lower extremities 4 layer compression wrap applied as per Dr. Jeansonne orders'. Education on wound care dressing provided to MR. Arrieta, voices understanding.      Review of Systems   Constitutional: Negative for fever.   Respiratory: Negative for shortness of breath.    Cardiovascular: Negative for chest pain.         Objective:        Physical Exam   Constitutional: He appears well-developed and well-nourished. No distress.   HENT:   Head: Normocephalic and atraumatic.   Pulmonary/Chest: Effort normal. No respiratory distress.   Musculoskeletal: Normal range of motion.   Neurological: He is alert.   Skin:   See wound assessment   Psychiatric: He has a normal mood and affect. His behavior is normal. Judgment and thought content normal.       Vitals:    08/08/19 1613   BP: 118/63   Pulse: 94   Temp: 97.6 °F (36.4 °C)       Assessment:           ICD-10-CM " ICD-9-CM   1. Cellulitis of left leg L03.116 682.6   2. Cellulitis of right leg L03.115 682.6   3. Cellulitis and abscess of left leg L03.116 682.6    L02.416             Wound 08/08/19 1700 Blister(s) upper Leg #1 (Active)   08/08/19 1700    Pre-existing: Yes   Primary Wound Type: Blister(s)   Side: Left   Orientation: upper   Location: Leg   Wound/PI Number (optional): #1   Ankle-Brachial Index:    Pulses: present per doppler   Removal Indication and Assessment:    Wound Outcome:    (Retired) Wound Type:    (Retired) Wound Length (cm):    (Retired) Wound Width (cm):    (Retired) Depth (cm):    Wound Description (Comments):    Removal Indications:    Wound Image   8/8/2019  5:00 PM   Dressing Appearance Open to air;Moist drainage 8/8/2019  5:00 PM   Drainage Amount Moderate 8/8/2019  5:00 PM   Drainage Characteristics/Odor Serosanguineous 8/8/2019  5:00 PM   Appearance Pink;Red;Yellow;Moist 8/8/2019  5:00 PM   Tissue loss description Full thickness 8/8/2019  5:00 PM   Red (%), Wound Tissue Color 80 % 8/8/2019  5:00 PM   Yellow (%), Wound Tissue Color 20 % 8/8/2019  5:00 PM   Periwound Area Satellite lesion;Swelling;Warm;Intact;Pink;Redness 8/8/2019  5:00 PM   Wound Edges Defined 8/8/2019  5:00 PM   Wound Length (cm) 1.2 cm 8/8/2019  5:00 PM   Wound Width (cm) 1 cm 8/8/2019  5:00 PM   Wound Depth (cm) 0.1 cm 8/8/2019  5:00 PM   Wound Volume (cm^3) 0.12 cm^3 8/8/2019  5:00 PM   Wound Surface Area (cm^2) 1.2 cm^2 8/8/2019  5:00 PM   Care Cleansed with:;Sterile normal saline 8/8/2019  5:00 PM   Dressing Applied;Calcium alginate;Compression wrap 8/8/2019  5:00 PM   Periwound Care Moisture barrier applied 8/8/2019  5:00 PM   Dressing Change Due 08/13/19 8/8/2019  5:00 PM            Wound 08/08/19 1714 Blister(s) anterior Leg #2 (Active)   08/08/19 1714    Pre-existing: Yes   Primary Wound Type: Blister(s)   Side: Left   Orientation: anterior   Location: Leg   Wound/PI Number (optional): #2   Ankle-Brachial Index:     Pulses: pedal pulses present per doppler   Removal Indication and Assessment:    Wound Outcome:    (Retired) Wound Type:    (Retired) Wound Length (cm):    (Retired) Wound Width (cm):    (Retired) Depth (cm):    Wound Description (Comments):    Removal Indications:    Wound Image   8/8/2019  5:00 PM   Dressing Appearance Moist drainage;Open to air;Intact 8/8/2019  5:00 PM   Drainage Amount Moderate 8/8/2019  5:00 PM   Drainage Characteristics/Odor Serosanguineous 8/8/2019  5:00 PM   Appearance Pink;Red;Moist;Yellow 8/8/2019  5:00 PM   Tissue loss description Full thickness 8/8/2019  5:00 PM   Red (%), Wound Tissue Color 80 % 8/8/2019  5:00 PM   Yellow (%), Wound Tissue Color 20 % 8/8/2019  5:00 PM   Periwound Area Intact;Satellite lesion;Swelling;Pink;Redness;Moist 8/8/2019  5:00 PM   Wound Edges Defined 8/8/2019  5:00 PM   Wound Length (cm) 1 cm 8/8/2019  5:00 PM   Wound Width (cm) 2 cm 8/8/2019  5:00 PM   Wound Depth (cm) 0.3 cm 8/8/2019  5:00 PM   Wound Volume (cm^3) 0.6 cm^3 8/8/2019  5:00 PM   Wound Surface Area (cm^2) 2 cm^2 8/8/2019  5:00 PM   Care Cleansed with:;Sterile normal saline 8/8/2019  5:00 PM   Dressing Applied;Compression wrap 8/8/2019  5:00 PM   Periwound Care Moisture barrier applied 8/8/2019  5:00 PM   Compression Four layer compression 8/8/2019  5:00 PM   Dressing Change Due 08/13/19 8/8/2019  5:00 PM            Wound 08/08/19 1700 Other (comment) anterior Leg #3 (Active)   08/08/19 1700    Pre-existing: Yes   Primary Wound Type: Other   Side: Right   Orientation: anterior   Location: Leg   Wound/PI Number (optional): #3   Ankle-Brachial Index:    Pulses:    Removal Indication and Assessment:    Wound Outcome:    (Retired) Wound Type:    (Retired) Wound Length (cm):    (Retired) Wound Width (cm):    (Retired) Depth (cm):    Wound Description (Comments):    Removal Indications:    Wound Image   8/8/2019  5:00 PM   Dressing Appearance Dry;Intact;Open to air 8/8/2019  5:00 PM   Drainage Amount  None 8/8/2019  5:00 PM   Red (%), Wound Tissue Color 100 % 8/8/2019  5:00 PM   Wound Length (cm) 0 cm 8/8/2019  5:00 PM   Wound Width (cm) 0 cm 8/8/2019  5:00 PM   Wound Depth (cm) 0 cm 8/8/2019  5:00 PM   Wound Volume (cm^3) 0 cm^3 8/8/2019  5:00 PM   Wound Surface Area (cm^2) 0 cm^2 8/8/2019  5:00 PM   Care Cleansed with:;Sterile normal saline 8/8/2019  5:00 PM   Dressing Applied;Compression wrap 8/8/2019  5:00 PM   Periwound Care Moisture barrier applied 8/8/2019  5:00 PM   Compression Four layer compression 8/8/2019  5:00 PM   Dressing Change Due 08/13/19 8/8/2019  5:00 PM         Cleanse Bilateral lower extremities with normal saline.  Periwound care: sween cream bilateral dry lower extremities  Primary dressing: four layer compression wrap bilateral lower legs. Left leg open wounds apply aquacel extra before compression wraps.  Edema control: Elevate lower extremities  Frequency: once a week    Plan:          Compression, elevation, diuresis as prescribed.  Antibiotics to prevent progression to cellulitis.        Follow up in about 5 days (around 8/13/2019).

## 2019-08-09 ENCOUNTER — CLINICAL SUPPORT (OUTPATIENT)
Dept: REHABILITATION | Facility: HOSPITAL | Age: 64
End: 2019-08-09
Payer: COMMERCIAL

## 2019-08-09 ENCOUNTER — OFFICE VISIT (OUTPATIENT)
Dept: INTERNAL MEDICINE | Facility: CLINIC | Age: 64
End: 2019-08-09
Payer: COMMERCIAL

## 2019-08-09 VITALS
DIASTOLIC BLOOD PRESSURE: 54 MMHG | WEIGHT: 272.94 LBS | BODY MASS INDEX: 42.84 KG/M2 | TEMPERATURE: 98 F | HEIGHT: 67 IN | SYSTOLIC BLOOD PRESSURE: 106 MMHG | HEART RATE: 92 BPM

## 2019-08-09 DIAGNOSIS — E11.22 TYPE 2 DIABETES MELLITUS WITH STAGE 4 CHRONIC KIDNEY DISEASE, WITH LONG-TERM CURRENT USE OF INSULIN: Primary | ICD-10-CM

## 2019-08-09 DIAGNOSIS — Z74.09 IMPAIRED FUNCTIONAL MOBILITY, BALANCE, GAIT, AND ENDURANCE: ICD-10-CM

## 2019-08-09 DIAGNOSIS — R53.1 DECREASED STRENGTH: ICD-10-CM

## 2019-08-09 DIAGNOSIS — H91.90 DECREASED HEARING, UNSPECIFIED LATERALITY: ICD-10-CM

## 2019-08-09 DIAGNOSIS — N18.4 TYPE 2 DIABETES MELLITUS WITH STAGE 4 CHRONIC KIDNEY DISEASE, WITH LONG-TERM CURRENT USE OF INSULIN: Primary | ICD-10-CM

## 2019-08-09 DIAGNOSIS — Z79.4 TYPE 2 DIABETES MELLITUS WITH STAGE 4 CHRONIC KIDNEY DISEASE, WITH LONG-TERM CURRENT USE OF INSULIN: Primary | ICD-10-CM

## 2019-08-09 PROCEDURE — 3074F SYST BP LT 130 MM HG: CPT | Mod: CPTII,S$GLB,, | Performed by: INTERNAL MEDICINE

## 2019-08-09 PROCEDURE — 3078F DIAST BP <80 MM HG: CPT | Mod: CPTII,S$GLB,, | Performed by: INTERNAL MEDICINE

## 2019-08-09 PROCEDURE — 3074F PR MOST RECENT SYSTOLIC BLOOD PRESSURE < 130 MM HG: ICD-10-PCS | Mod: CPTII,S$GLB,, | Performed by: INTERNAL MEDICINE

## 2019-08-09 PROCEDURE — 99213 PR OFFICE/OUTPT VISIT, EST, LEVL III, 20-29 MIN: ICD-10-PCS | Mod: S$GLB,,, | Performed by: INTERNAL MEDICINE

## 2019-08-09 PROCEDURE — 99999 PR PBB SHADOW E&M-EST. PATIENT-LVL III: CPT | Mod: PBBFAC,,, | Performed by: INTERNAL MEDICINE

## 2019-08-09 PROCEDURE — 99213 OFFICE O/P EST LOW 20 MIN: CPT | Mod: S$GLB,,, | Performed by: INTERNAL MEDICINE

## 2019-08-09 PROCEDURE — 3008F BODY MASS INDEX DOCD: CPT | Mod: CPTII,S$GLB,, | Performed by: INTERNAL MEDICINE

## 2019-08-09 PROCEDURE — 99999 PR PBB SHADOW E&M-EST. PATIENT-LVL III: ICD-10-PCS | Mod: PBBFAC,,, | Performed by: INTERNAL MEDICINE

## 2019-08-09 PROCEDURE — 97162 PT EVAL MOD COMPLEX 30 MIN: CPT | Mod: PO

## 2019-08-09 PROCEDURE — 3045F PR MOST RECENT HEMOGLOBIN A1C LEVEL 7.0-9.0%: ICD-10-PCS | Mod: CPTII,S$GLB,, | Performed by: INTERNAL MEDICINE

## 2019-08-09 PROCEDURE — 3078F PR MOST RECENT DIASTOLIC BLOOD PRESSURE < 80 MM HG: ICD-10-PCS | Mod: CPTII,S$GLB,, | Performed by: INTERNAL MEDICINE

## 2019-08-09 PROCEDURE — 3045F PR MOST RECENT HEMOGLOBIN A1C LEVEL 7.0-9.0%: CPT | Mod: CPTII,S$GLB,, | Performed by: INTERNAL MEDICINE

## 2019-08-09 PROCEDURE — 3008F PR BODY MASS INDEX (BMI) DOCUMENTED: ICD-10-PCS | Mod: CPTII,S$GLB,, | Performed by: INTERNAL MEDICINE

## 2019-08-09 NOTE — PROGRESS NOTES
Subjective:       Patient ID: Jian Arrieta is a 63 y.o. male.    Chief Complaint: Hearing Problem    HPI   Pt with T2DM and multiple other chronic conditions is here for evaluation of decreased hearing of his left ear which seems to be getting worse lately. He would like a hearing evaluation.   Review of Systems   Constitutional: Negative for activity change, appetite change, chills, diaphoresis, fatigue, fever and unexpected weight change.   HENT: Positive for hearing loss (left). Negative for ear discharge, ear pain, postnasal drip, rhinorrhea, sinus pressure, sneezing, sore throat, trouble swallowing and voice change.    Respiratory: Negative for cough, shortness of breath and wheezing.    Cardiovascular: Negative for chest pain, palpitations and leg swelling.   Gastrointestinal: Negative for abdominal pain, blood in stool, constipation, diarrhea, nausea and vomiting.   Genitourinary: Negative for dysuria.   Musculoskeletal: Negative for arthralgias and myalgias.   Skin: Negative for rash and wound.   Allergic/Immunologic: Negative for environmental allergies and food allergies.   Hematological: Negative for adenopathy. Does not bruise/bleed easily.       Objective:      Physical Exam   Constitutional: He is oriented to person, place, and time. He appears well-developed and well-nourished. No distress.   HENT:   Head: Normocephalic and atraumatic.   Right Ear: External ear normal.   Left Ear: External ear normal.   Nose: Nose normal.   Mouth/Throat: Oropharynx is clear and moist. No oropharyngeal exudate.   L cerumen impaction    Eyes: Pupils are equal, round, and reactive to light. Conjunctivae and EOM are normal. Right eye exhibits no discharge. Left eye exhibits no discharge. No scleral icterus.   Neck: Normal range of motion. Neck supple. No JVD present.   Cardiovascular: Normal rate, regular rhythm and normal heart sounds.   No murmur heard.  Pulmonary/Chest: Effort normal and breath sounds normal. No  respiratory distress. He has no wheezes. He has no rales.   Musculoskeletal: He exhibits no edema.   Lymphadenopathy:     He has no cervical adenopathy.   Neurological: He is alert and oriented to person, place, and time.   Skin: Skin is warm and dry. No rash noted. He is not diaphoretic. No pallor.       Assessment:       1. Type 2 diabetes mellitus with stage 4 chronic kidney disease, with long-term current use of insulin    2. Decreased hearing, unspecified laterality        Plan:    1. Referral to [facility]       Pt has been off his insulins(he will restart them)       Document blood sugar BID   2. Referral to ENT   3. F/u in 1 wk for DM

## 2019-08-09 NOTE — PLAN OF CARE
"[facility]  Physical Therapy Initial Evaluation    Name: Jian JASMINE Meenakshi  [facility] Number: [id number]    Therapy Diagnosis:   Encounter Diagnoses   Name Primary?    Decreased strength     Impaired functional mobility, balance, gait, and endurance      Physician: Liliane Churchill MD    Physician Orders: PT Eval and Treat   Medical Diagnosis from Referral: Decreased mobility  Evaluation Date: 8/9/2019  Authorization Period Expiration: 12/31/19  Plan of Care Expiration: 10/4/19  Visit # / Visits authorized: 1/ 50    Time In: 8:00  Time Out: 9:00  Total Billable Time: 60 minutes    Precautions: Standard, Diabetes, Fall and Cardiac, BLE lymphedema    Subjective   Date of onset: July, 2019  History of current condition - Jian reports: "I'm trying to get back on my feet." pt states he was recently hospitalized for a blocked portal vein. Pt states he had an angiogram to fix the blockage, and felt better after 3 days. Pt states he was in the hospital for ~15 days. Prior to hospitalization, pt was getting ascites drained regularly. This has resolved since the portal vein blockage was treated.  Pt has wound care in distal BLE's- pt states he went to [facility] for [facility] yesterday, but now he is not sure of what the follow-up care will be. Pt endorses having lymphedema issues in BLE's for years. Pt reports he's had ~ 5 falls in 9 months, most recently on July 15th.  Pt also reports that he had an episode of pancreatitis 9 months ago. Pt states he accidentally pulled out his PICC line and that he has stopped taking some of his medications. Pt states he will talk to his doctor about this at the next visit.     Medical History:   Past Medical History:   Diagnosis Date    Alcohol abuse     Anasarca 1/28/2019    Arthropathy associated with neurological disorder 9/2/2015    Atherosclerosis     Charcot foot due to diabetes mellitus     Chronic combined systolic and diastolic heart failure 01/29/2019 "    1-28-19 Left VentricleModerate decreased ejection fraction at 30%. Normal left ventricular cavity size. Normal wall thickness observed. Grade I (mild) left ventricular diastolic dysfunction consistent with impaired relaxation. Normal left atrial pressure. Moderate, global hypokinesis(see wall scoring diagram). Right VentricleNormal cavity size, wall thickness and ejection fraction. Wall motion n    Chronic pancreatitis 1/28/2019    Colon polyps     approx 5 yrs ago    Coronary artery disease due to calcified coronary lesion 05/08/2015    5 stents on ASA      Diabetic polyneuropathy associated with type 2 diabetes mellitus 9/2/2015    Diverticulosis 1/28/2019    DM type 2 with diabetic peripheral neuropathy 2/4/2019    Encounter for blood transfusion     Essential hypertension 1/28/2019    Former smoker 8/26/2015    Healed ulcer of left foot on examination 6/20/2017    Hydrocele     approx 1.5 yrs ago    Hypoalbuminemia 2/4/2019    Lymphedema of both lower extremities 1/29/2019    Mixed hyperlipidemia 5/8/2015    Morbid obesity with BMI of 50.0-59.9, adult 5/8/2015    Onychomycosis of multiple toenails with type 2 diabetes mellitus and peripheral neuropathy 6/20/2017    Perianal cyst     approx 2 yrs ago    Pseudocyst of pancreas 1/28/2019 1-28-19 Liver has a cirrhotic morphology with no focal lesions.  Significant interval increase in ascites when compared to prior exam which may account for patient's abdominal distension.  Hypodense air-fluid collection along the body of the pancreas which is slightly smaller when compared to prior CT.  Findings may relate to pancreatic necrosis with pancreatic pseudocysts with infected pseudocyst    Skin cancer     skin cancer    Sleep apnea 8/26/2015    Status post bariatric surgery 1/11/2016    Type 2 diabetes mellitus, with long-term current use of insulin 5/8/2015       Surgical History:   Jian Arrieta  has a past surgical history that includes  "Angioplasty; Cyst Removal; perianal surgery; Colonoscopy (N/A, 10/6/2015); Knee arthroscopy; Gastrectomy; Coronary artery bypass graft (2017); Coronary angiography (Right, 3/20/2019); Coronary bypass graft angiography (3/20/2019); Insertion of dialysis catheter (N/A, 5/17/2019); Angiography (N/A, 6/28/2019); and Esophagogastroduodenoscopy (N/A, 7/8/2019).    Medications:   Jian has a current medication list which includes the following prescription(s): acetaminophen, albuterol-ipratropium, amoxicillin-clavulanate 875-125mg, apixaban, atorvastatin, clopidogrel, cyanocobalamin (vitamin b-12), furosemide, insulin aspart u-100, insulin, isosorbide mononitrate, lipase-protease-amylase, metoclopramide hcl, metoprolol succinate, midodrine, omeprazole, ramelteon, senna-docusate 8.6-50 mg, spironolactone, tamsulosin, and insulin detemir u-100.    Allergies:   Review of patient's allergies indicates:  No Known Allergies     Imaging, CT scan films: No acute large vascular territory infarct or intracranial hemorrhage identified.    Prior Therapy: Yes, Home PT prior to hospitalization  Social History: pt lives with their spouse- I live in the garage, "i'm  sometimes." Private home, 4 steps in front, 3 steps in the back. Pt has handrail with both steps, pt has a "lawnmower ramp" in the back as well.  One level inside. In the garage pt has a hospital bed, easy chairs, bathroom with a low threshold bath tub. Pt has not been up the stairs (20 steps) in his 2 story garage where he has a jacuzzi. Pt has a chair in the shower but does not have grab bars installed yet. Pt states he just got a new toilet seat. Pt has a Rollator, electric W/C, and a cane.  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   Prior Level of Function: pt had increased strength prior to going to hospital  Current Level of Function: difficulty getting up from low surfaces. Pt can walk more since his angiography. Pt states he " "would like to learn how to get up after falling without calling the fire department.  Pt reports 5 falls in 9 months.  Feels like his upper body strength is weak and his legs are not working at full capacity. Pt wants his legs to be stretched. Pt can't get into his pick-up truck. Going up and down steps is difficult.    Pain:  Current 0/10, worst 6/10, best 0/10   Location: right trunk  Description: not described-" weird pain"  Aggravating Factors: driving   Easing Factors: leaning to side    Pts goals: get back on my feet and get my strength back    Objective     Observation: pt received ambulating without a device, BLE bandaging for distal LE's. Pt with slow gait speed, increased CLEO, B Trendelenburg, decreased B step length and heel strike, decreased knee flexion during swing phase.    Posture:  Rounded shoulders, forward head, obese    BUE strength is 5/5 B    Lower Extremity Strength (Seated)  Right LE  Left LE    Knee extension: 4/5 Knee extension: 5/5   Knee flexion: 4+/5 Knee flexion: 4/5   Hip flexion: 4+/5 Hip flexion: 3-/5   Hip extension:  >/=3/5 Hip extension: >/=3/5   Hip abduction: 4/5 Hip abduction: 4/5   Hip adduction: 5/5 Hip adduction 5/5   Ankle dorsiflexion: 5/5 Ankle dorsiflexion: 5/5   Ankle plantarflexion: 4+/5 Ankle plantarflexion: 5/5         Special Tests:  - Sit <> stands in 30s (from 23" mat table): 7 reps with hands on thighs  -TUG from gold chair, 22s, no device    MCTSIB (Modified Clinical Test of Sensory Interaction on Balance):  1. Eyes Open/feet together/Firm: 30 seconds  2. Eyes Closed/feet together/Firm: 30 seconds  3. Eyes Open/feet together/Foam: TBA seconds  4. Eyes Closed/feet together/Foam: TBA seconds      Sensation: light touch intact.    Flexibility: decreased hamstring flexibility B        CMS Impairment/Limitation/Restriction for FOTO Muscle, tendon, and soft tissue Survey    Therapist reviewed FOTO scores for Jian Arrieta on 8/9/2019.   FOTO documents entered into " EPIC - see Media section.    Limitation Score: 58%  Category: Other         TREATMENT   Treatment Time In: 8:50  Treatment Time Out: 8:55  Total Treatment time separate from Evaluation: 5 minutes (no charge dropped)    Jian received therapeutic exercises to develop strength, endurance, ROM and core stabilization for 10 minutes including:  Seated marching 10 reps B, 3s holds  Seated knee extension, 10 reps B, 3s holds      Home Exercises and Patient Education Provided    Education provided:   - HEP, POC, scheduling. PT advised that given pt's LE wounds and history of lymphedema, he should not be going in jacuzzi tubs, as this could trigger lymphedema or lead to infection in wounds. Pt verbalizes understanding but may require review    Written Home Exercises Provided: yes.  Exercises were reviewed and Jian was able to demonstrate them prior to the end of the session.  Jian demonstrated good  understanding of the education provided.     See EMR under Patient Instructions for exercises provided 8/9/2019.    Assessment   Jian is a 63 y.o. male referred to outpatient Physical Therapy with a medical diagnosis of decreased mobility. Pt presents with lateral trunk pain, decreased strength, poor posture, decreased ROM, impaired gait, decreased endurance, impaired functional mobility, and activity tolerance.      Pt prognosis is Good.   Pt will benefit from skilled outpatient Physical Therapy to address the deficits stated above and in the chart below, provide pt/family education, and to maximize pt's level of independence.     Plan of care discussed with patient: Yes  Pt's spiritual, cultural and educational needs considered and patient is agreeable to the plan of care and goals as stated below:     Anticipated Barriers for therapy: co-morbidities    Medical Necessity is demonstrated by the following  History  Co-morbidities and personal factors that may impact the plan of care Co-morbidities:   CAD, CHF, coping  style/mechanism, diabetes, high BMI, prior abdominal surgery and BLE lymphedema, wounds on BLE's, anasarca    Personal Factors:   lifestyle  attitudes     high   Examination  Body Structures and Functions, activity limitations and participation restrictions that may impact the plan of care Body Regions:   back  lower extremities  upper extremities  trunk    Body Systems:    ROM  strength  balance  gait  transfers  transitions  motor control  posture, endurance    Participation Restrictions:   None anticipated    Activity limitations:   Learning and applying knowledge  no deficits    General Tasks and Commands  no deficits    Communication  no deficits    Mobility  lifting and carrying objects  walking  driving (bike, car, motorcycle)    Self care  washing oneself (bathing, drying, washing hands)  dressing    Domestic Life  shopping  cooking  doing house work (cleaning house, washing dishes, laundry)    Interactions/Relationships  no deficits    Life Areas  no deficits    Community and Social Life  community life  recreation and leisure         high   Clinical Presentation evolving clinical presentation with changing clinical characteristics moderate   Decision Making/ Complexity Score: moderate     Goals:  Short Term Goals: 4 weeks   1. Pt will tolerate HEP for improved strength, functional mobility, ROM, posture, and endurance. (progressing, not met)  2. Pt will demo >/= 4/5 strength in BLE's for improved functional mobility, endurance, and posture. (progressing, not met)  3. Pt will reduce TUG (timed up and go) time to </= 17 sec for improved safety with community ambulation and decreased falls risk. (progressing, not met)  4. Pt will perform 9 sit <> stands with use of armrests in 30s for improved endurance. (progressing, not met)  5. Pt will maintain balance in MCTSIB (Modified Clinical Test of Sensory Interaction on Balance) scenario 3 for >/=30s for improved balance/decreased falls risk. (progressing, not  met)  6. Pt will report feeling more steady/confident on his feet when walking at home and in the community for improved functional mobility. (progressing, not met)        Long Term Goals: 8 weeks   1. Pt will be I with updated HEP for improved functional mobility, posture, strength, and endurance. (progressing, not met)  2. Pt will demo 5/5 strength in BLE's for improved functional mobility, endurance, and posture. (progressing, not met)  3. Pt will reduce TUG (timed up and go) time to </= 11 sec for improved safety with community ambulation and decreased falls risk. (progressing, not met)  4. Pt will perform 11 sit <> stands with use of armrests in 30s for improved endurance. (progressing, not met)  5. Pt will maintain balance in MCTSIB (Modified Clinical Test of Sensory Interaction on Balance) scenario 4 for >/=30s for improved balance/decreased falls risk. (progressing, not met)  6. Pt will report/demo negotiating 1 flight of stairs with Mod I for improved functional mobility at home and in the community. (progressing, not met)    Plan   Plan of care Certification: 8/9/2019 to 10/4/19.    Outpatient Physical Therapy 2 times weekly for 8 weeks to include the following interventions: Gait Training, Manual Therapy, Neuromuscular Re-ed, Patient Education, Self Care, Therapeutic Activites, Therapeutic Exercise and modalities as appropriate.     Dora Mendez, PT

## 2019-08-12 PROBLEM — Z74.09 IMPAIRED FUNCTIONAL MOBILITY, BALANCE, GAIT, AND ENDURANCE: Status: ACTIVE | Noted: 2019-08-12

## 2019-08-12 PROBLEM — R53.1 DECREASED STRENGTH: Status: ACTIVE | Noted: 2019-08-12

## 2019-08-13 ENCOUNTER — OFFICE VISIT (OUTPATIENT)
Dept: NEUROLOGY | Facility: HOSPITAL | Age: 64
End: 2019-08-13
Attending: SURGERY
Payer: COMMERCIAL

## 2019-08-13 ENCOUNTER — HOSPITAL ENCOUNTER (OUTPATIENT)
Dept: WOUND CARE | Facility: HOSPITAL | Age: 64
Discharge: HOME | End: 2019-08-13
Attending: SURGERY
Payer: COMMERCIAL

## 2019-08-13 VITALS
BODY MASS INDEX: 42.32 KG/M2 | TEMPERATURE: 98 F | DIASTOLIC BLOOD PRESSURE: 81 MMHG | WEIGHT: 269.63 LBS | HEIGHT: 67 IN | HEART RATE: 96 BPM | SYSTOLIC BLOOD PRESSURE: 124 MMHG

## 2019-08-13 DIAGNOSIS — L02.416 CELLULITIS AND ABSCESS OF LEFT LEG: ICD-10-CM

## 2019-08-13 DIAGNOSIS — E66.01 OBESITY, CLASS III, BMI 40-49.9 (MORBID OBESITY): ICD-10-CM

## 2019-08-13 DIAGNOSIS — K76.6 PORTAL HYPERTENSION DUE TO OBSTRUCTION OF EXTRAHEPATIC PORTAL VEIN: Primary | ICD-10-CM

## 2019-08-13 DIAGNOSIS — L03.116 CELLULITIS AND ABSCESS OF LEFT LEG: ICD-10-CM

## 2019-08-13 DIAGNOSIS — R18.8 OTHER ASCITES: ICD-10-CM

## 2019-08-13 DIAGNOSIS — I81 PORTAL HYPERTENSION DUE TO OBSTRUCTION OF EXTRAHEPATIC PORTAL VEIN: Primary | ICD-10-CM

## 2019-08-13 DIAGNOSIS — L03.116 CELLULITIS OF LEFT LEG: Primary | ICD-10-CM

## 2019-08-13 DIAGNOSIS — I48.0 PAROXYSMAL ATRIAL FIBRILLATION: ICD-10-CM

## 2019-08-13 DIAGNOSIS — K86.1 IDIOPATHIC CHRONIC PANCREATITIS: ICD-10-CM

## 2019-08-13 DIAGNOSIS — L03.115 CELLULITIS OF RIGHT LEG: ICD-10-CM

## 2019-08-13 PROCEDURE — 99214 OFFICE O/P EST MOD 30 MIN: CPT | Mod: 25 | Performed by: SURGERY

## 2019-08-13 PROCEDURE — 29581 APPL MULTLAYER CMPRN SYS LEG: CPT

## 2019-08-13 NOTE — PATIENT INSTRUCTIONS
Return To Clinic: 6 months    Scans: U/S abdomen complete  Call Scheduling Department at [phone] to schedule scans prior to next appointment:

## 2019-08-13 NOTE — PROGRESS NOTES
"[facility]                Progress Note    Subjective:       Patient ID: Jian Arrieta is a 63 y.o. male.    Chief Complaint: Venous Ulcer and Venous Stasis    Chief Complaint: Non-healing Wound     8/8/2019: Pt presents to wound care clinic referred from [facility]. History of T2DM with neuropathy, PAF, HTN, HLD, CKD IV, Atherosclerosis, Anemia, Morbid Obesity, Chronic LE lymphedema, BERHANE, Ascites/portal HTN, Chronic pancreatitis. Per records, "Pt presented to [facility] 6/27/19 as a transfer from [facility] with large volume ascites 2/2 portal vein occlusion requiring IR procedure for recannalization." Patient initially presented to [facility] for worsening RLE pain and bilateral lower extremity lymphedema contributing to progressive immobility combined with wife and sister no longer being able to care for patient. Patient received vancomycin for concern for RLE cellulitis and also had US of BLE which were negative for DVT. New admit for Dr. Jeansonne. Pedal pulses and bilateral lower extremities edema +3 present. Bilateral lower extremities 4 layer compression wrap applied as per Dr. Jeansonne orders'. Education on wound care dressing provided to MR. Arrieta, voices understanding.  8/13/19:  Nurse visit for dressing change.  Profore 4 layer multi compression wrap to BLEs from toe to knee.  Patient tolerated well.        Review of Systems      Objective:        Physical Exam    There were no vitals filed for this visit.    Assessment:           ICD-10-CM ICD-9-CM   1. Cellulitis of left leg L03.116 682.6   2. Cellulitis of right leg L03.115 682.6   3. Cellulitis and abscess of left leg L03.116 682.6    L02.416    4. Obesity, Class III, BMI 40-49.9 (morbid obesity) E66.01 278.01            Wound 08/08/19 1700 Blister(s) upper Leg #1 (Active)   08/08/19 1700    Pre-existing: Yes   Primary Wound Type: Blister(s)   Side: Left "   Orientation: upper   Location: Leg   Wound/PI Number (optional): #1   Ankle-Brachial Index:    Pulses: present per doppler   Removal Indication and Assessment:    Wound Outcome:    (Retired) Wound Type:    (Retired) Wound Length (cm):    (Retired) Wound Width (cm):    (Retired) Depth (cm):    Wound Description (Comments):    Removal Indications:    Dressing Appearance Intact;Clean;Moist drainage 8/13/2019  1:00 PM   Drainage Amount Small 8/13/2019  1:00 PM   Drainage Characteristics/Odor Serosanguineous 8/13/2019  1:00 PM   Appearance Red;Pink;Yellow;Moist;Slough 8/13/2019  1:00 PM   Tissue loss description Full thickness 8/13/2019  1:00 PM   Red (%), Wound Tissue Color 80 % 8/13/2019  1:00 PM   Yellow (%), Wound Tissue Color 20 % 8/13/2019  1:00 PM   Periwound Area Intact;Redness;Edematous 8/13/2019  1:00 PM   Wound Edges Defined;Open 8/13/2019  1:00 PM   Wound Length (cm) 1.2 cm 8/13/2019  1:00 PM   Wound Width (cm) 1 cm 8/13/2019  1:00 PM   Wound Depth (cm) 0.1 cm 8/13/2019  1:00 PM   Wound Volume (cm^3) 0.12 cm^3 8/13/2019  1:00 PM   Wound Surface Area (cm^2) 1.2 cm^2 8/13/2019  1:00 PM   Care Cleansed with:;Sterile normal saline 8/13/2019  1:00 PM   Dressing Applied;Changed;Calcium alginate;Compression wrap 8/13/2019  1:00 PM   Periwound Care Moisturizer applied 8/13/2019  1:00 PM   Compression Four layer compression 8/13/2019  1:00 PM   Dressing Change Due 08/20/19 8/13/2019  1:00 PM            Wound 08/08/19 1714 Blister(s) anterior Leg #2 (Active)   08/08/19 1714    Pre-existing: Yes   Primary Wound Type: Blister(s)   Side: Left   Orientation: anterior   Location: Leg   Wound/PI Number (optional): #2   Ankle-Brachial Index:    Pulses: pedal pulses present per doppler   Removal Indication and Assessment:    Wound Outcome:    (Retired) Wound Type:    (Retired) Wound Length (cm):    (Retired) Wound Width (cm):    (Retired) Depth (cm):    Wound Description (Comments):    Removal Indications:    Dressing  Appearance Intact;Moist drainage;Clean 8/13/2019  1:00 PM   Drainage Amount Moderate 8/13/2019  1:00 PM   Drainage Characteristics/Odor Serosanguineous 8/13/2019  1:00 PM   Appearance Pink;Red;Yellow;Slough;Fibrin 8/13/2019  1:00 PM   Tissue loss description Full thickness 8/13/2019  1:00 PM   Red (%), Wound Tissue Color 80 % 8/13/2019  1:00 PM   Yellow (%), Wound Tissue Color 20 % 8/13/2019  1:00 PM   Periwound Area Intact;Redness;Edematous 8/13/2019  1:00 PM   Wound Edges Defined;Open 8/13/2019  1:00 PM   Wound Length (cm) 1 cm 8/13/2019  1:00 PM   Wound Width (cm) 2 cm 8/13/2019  1:00 PM   Wound Depth (cm) 0.3 cm 8/13/2019  1:00 PM   Wound Volume (cm^3) 0.6 cm^3 8/13/2019  1:00 PM   Wound Surface Area (cm^2) 2 cm^2 8/13/2019  1:00 PM   Care Cleansed with:;Sterile normal saline 8/13/2019  1:00 PM   Dressing Applied;Calcium alginate;Changed;Compression wrap 8/13/2019  1:00 PM   Periwound Care Moisture barrier applied 8/13/2019  1:00 PM   Compression Four layer compression 8/13/2019  1:00 PM   Dressing Change Due 08/20/19 8/13/2019  1:00 PM            Wound 08/08/19 1700 Other (comment) anterior Leg #3 (Active)   08/08/19 1700    Pre-existing: Yes   Primary Wound Type: Other   Side: Right   Orientation: anterior   Location: Leg   Wound/PI Number (optional): #3   Ankle-Brachial Index:    Pulses:    Removal Indication and Assessment:    Wound Outcome:    (Retired) Wound Type:    (Retired) Wound Length (cm):    (Retired) Wound Width (cm):    (Retired) Depth (cm):    Wound Description (Comments):    Removal Indications:    Dressing Appearance Dry;Intact;Clean 8/13/2019  1:00 PM   Drainage Amount None 8/13/2019  1:00 PM   Periwound Area Edematous 8/13/2019  1:00 PM   Care Cleansed with:;Soap and water 8/13/2019  1:00 PM   Dressing Applied;Changed;Compression wrap 8/13/2019  1:00 PM   Periwound Care Moisturizer applied 8/13/2019  1:00 PM   Compression Four layer compression 8/13/2019  1:00 PM   Dressing Change Due  08/20/19 8/13/2019  1:00 PM             Cleanse Bilateral lower extremities with normal saline.  Periwound care: sween cream bilateral dry lower extremities  Primary dressing: Left leg open wounds apply aquacel extra before compression wraps.  Edema control: Four layer compression wrap bilateral lower legs from toe to knee, Elevate lower extremities  Frequency: weekly         Plan:          Follow-up with Dr. Jeansonne on 8/22/19.

## 2019-08-13 NOTE — PROGRESS NOTES
"NOLANETS:  [facility]  A collaboration between [facility] and [facility]      PATIENT: Jian Arrieta  MRN: [id number]  DATE: 8/13/2019    Subjective:      Chief Complaint: Follow-up (Hospital f/u )      Vitals:   Vitals:    08/13/19 1147   BP: 124/81   BP Location: Left arm   Patient Position: Sitting   BP Method: Medium (Automatic)   Pulse: 96   Temp: 97.9 °F (36.6 °C)   TempSrc: Oral   Weight: 122.3 kg (269 lb 10 oz)   Height: 5' 7" (1.702 m)        Karnofsky Score:     Diagnosis:   1. Portal hypertension due to obstruction of extrahepatic portal vein    2. Other ascites    3. Idiopathic chronic pancreatitis    4. Obesity, Class III, BMI 40-49.9 (morbid obesity)    5. Paroxysmal atrial fibrillation         Oncologic History: na    Interval History: 63 y.o. male with PMH morbid obesity (BMI 41), stage 1 hepatic fibrosis (biopsy negative for cirrhosis), chronic pancreatitis and extrahepatic portal vein stenosis with  large volume ascites requiring twice weekly paracenteses in addition to pancreatic pseudocyst, non-compliance with low salt diet/fluid restriction, DM insulin dependent (HgA1c 7.1 on 5/23/19), BLE lymphedema, CHF, pAF on Eliquis and Metoprolol, CAD s/p PCI x5 on Plavix with subsequent CABG x3v (~2017 at ; LIMA-LAD [patent], SVG-OM [patent], SVG-RCA[occluded; L->R collaterals from LAD to PDA]), HTN, HLD, BERHANE on CPAP, R knee OA, Charcot foot, normocytic anemia, who presented to [facility] 6/27/19 as a transfer from [facility] with large volume ascites 2/2 portal vein occlusion requiring IR PV dilation.  Patient experienced acute renal failure after dye injection superimposed on diabetic nephropathy.  Renal insufficiency resolved ascites resolved and lower extremity edema improved with fluid restriction and diuretics.  Patient was discharged to a rehab unit.   Looking for to beginning home physical " therapy and being followed in wound care for chronic venous stasis ulcer.   He states his blood sugars have been fine his blood sugar has been good on minimal medications and his ascites has resolved.  He still admits to noncompliance with salt and water and lower extremity edema worsens when he is noncompliant.      Past Medical History:  Past Medical History:   Diagnosis Date    Alcohol abuse     Anasarca 1/28/2019    Arthropathy associated with neurological disorder 9/2/2015    Atherosclerosis     Charcot foot due to diabetes mellitus     Chronic combined systolic and diastolic heart failure 01/29/2019 1-28-19 Left VentricleModerate decreased ejection fraction at 30%. Normal left ventricular cavity size. Normal wall thickness observed. Grade I (mild) left ventricular diastolic dysfunction consistent with impaired relaxation. Normal left atrial pressure. Moderate, global hypokinesis(see wall scoring diagram). Right VentricleNormal cavity size, wall thickness and ejection fraction. Wall motion n    Chronic pancreatitis 1/28/2019    Colon polyps     approx 5 yrs ago    Coronary artery disease due to calcified coronary lesion 05/08/2015    5 stents on ASA      Diabetic polyneuropathy associated with type 2 diabetes mellitus 9/2/2015    Diverticulosis 1/28/2019    DM type 2 with diabetic peripheral neuropathy 2/4/2019    Encounter for blood transfusion     Essential hypertension 1/28/2019    Former smoker 8/26/2015    Healed ulcer of left foot on examination 6/20/2017    Hydrocele     approx 1.5 yrs ago    Hypoalbuminemia 2/4/2019    Lymphedema of both lower extremities 1/29/2019    Mixed hyperlipidemia 5/8/2015    Morbid obesity with BMI of 50.0-59.9, adult 5/8/2015    Onychomycosis of multiple toenails with type 2 diabetes mellitus and peripheral neuropathy 6/20/2017    Perianal cyst     approx 2 yrs ago    Pseudocyst of pancreas 1/28/2019 1-28-19 Liver has a cirrhotic morphology with  no focal lesions.  Significant interval increase in ascites when compared to prior exam which may account for patient's abdominal distension.  Hypodense air-fluid collection along the body of the pancreas which is slightly smaller when compared to prior CT.  Findings may relate to pancreatic necrosis with pancreatic pseudocysts with infected pseudocyst    Skin cancer     skin cancer    Sleep apnea 8/26/2015    Status post bariatric surgery 1/11/2016    Type 2 diabetes mellitus, with long-term current use of insulin 5/8/2015       Past Surgical History:  Past Surgical History:   Procedure Laterality Date    ANGIOGRAM, CORONARY ARTERY Right 3/20/2019    Performed by oBb Duque MD at [facility] CATH LAB    ANGIOGRAM-PV STENT N/A 6/28/2019    Performed by [facility] Diagnostic Provider at [facility] OR    ANGIOPLASTY      total x5 stents    Bypass graft study  3/20/2019    Performed by Bob Duque MD at [facility] CATH LAB    COLONOSCOPY N/A 10/6/2015    Performed by Shekhar Richards MD at [facility] ENDO (2ND FLR)    CORONARY ARTERY BYPASS GRAFT  2017    x3    CYST REMOVAL      ESOPHAGOGASTRODUODENOSCOPY (EGD) N/A 7/8/2019    Performed by Huan Brumfield MD at [facility] ENDO    GASTRECTOMY      GASTRECTOMY-SLEEVE-LAPAROSCOPIC - 06900 N/A 12/22/2015    Performed by Micheal Villavicencio Jr., MD at [facility] OR 2ND FLR    KNEE ARTHROSCOPY      perianal surgery      perianal cyst removed    pleurx N/A 5/17/2019    Performed by [facility] Diagnostic Provider at [facility] OR 2ND FLR       Family History:  Family History   Problem Relation Age of Onset    Cancer Mother     Cancer Father     Heart disease Father     Obesity Sister     Parkinsonism Brother     No Known Problems Paternal Grandmother     Cancer Paternal Grandfather     Cancer Brother     Diabetes Maternal Grandmother     Stroke Maternal Grandfather     Cirrhosis Neg Hx        Allergies:  Review of patient's allergies indicates:  No Known Allergies    Medications:  Current  Outpatient Medications   Medication Sig Dispense Refill    acetaminophen (TYLENOL) 325 MG tablet Take 2 tablets (650 mg total) by mouth every 4 (four) hours as needed.  0    albuterol-ipratropium (DUO-NEB) 2.5 mg-0.5 mg/3 mL nebulizer solution Take 3 mLs by nebulization every 4 (four) hours as needed for Wheezing. Rescue 1 Box 0    amoxicillin-clavulanate 875-125mg (AUGMENTIN) 875-125 mg per tablet Take 1 tablet by mouth 2 (two) times daily for 10 days 20 tablet 0    apixaban (ELIQUIS) 5 mg Tab Take 1 tablet (5 mg total) by mouth 2 (two) times daily. 90 tablet 3    atorvastatin (LIPITOR) 40 MG tablet Take 1 tablet (40 mg total) by mouth once daily. 90 tablet 3    clopidogrel (PLAVIX) 75 mg tablet Take 1 tablet (75 mg total) by mouth once daily. 30 tablet 11    cyanocobalamin, vitamin B-12, 500 mcg Subl Place 1 tablet under the tongue every Monday.       furosemide (LASIX) 20 MG tablet Take 1 tablet (20 mg total) by mouth 2 (two) times daily. 60 tablet 11    insulin aspart U-100 (NOVOLOG) 100 unit/mL injection Inject 4 Units into the skin 3 (three) times daily before meals.      insulin glargine 100 units/mL (3mL) SubQ pen Inject 12 Units into the skin every evening. 15 mL 12    isosorbide mononitrate (IMDUR) 30 MG 24 hr tablet Take 1 tablet (30 mg total) by mouth once daily. 30 tablet 11    lipase-protease-amylase (CREON) 36,000-114,000- 180,000 unit CpDR Take 1 capsule by mouth 3 (three) times daily. 270 capsule 3    metoclopramide HCl (REGLAN) 10 MG tablet Take 1 tablet (10 mg total) by mouth 3 (three) times daily before meals. 90 tablet 3    metoprolol succinate (TOPROL-XL) 25 MG 24 hr tablet Take 1 tablet (25 mg total) by mouth once daily. 30 tablet 11    midodrine (PROAMATINE) 2.5 MG Tab Take 1 tablet (2.5 mg total) by mouth 3 (three) times daily with meals. 90 tablet 11    omeprazole (PRILOSEC) 40 MG capsule Take 40 mg by mouth once daily.  8    ramelteon (ROZEREM) 8 mg tablet Take 1 tablet (8  mg total) by mouth nightly as needed for Insomnia. 30 tablet 11    senna-docusate 8.6-50 mg (PERICOLACE) 8.6-50 mg per tablet Take 1 tablet by mouth 2 (two) times daily as needed for Constipation.      spironolactone (ALDACTONE) 25 MG tablet Take 25 mg by mouth 2 (two) times daily.      tamsulosin (FLOMAX) 0.4 mg Cap Take 1 capsule by mouth once daily. Pt has not started taking as of 2/27/19.  0     No current facility-administered medications for this visit.        Review of Systems   Constitutional: Positive for activity change, appetite change and unexpected weight change. Negative for fatigue and fever.   HENT: Negative.    Eyes:        Wears glasses   Respiratory: Negative for cough, shortness of breath and wheezing.    Cardiovascular: Positive for palpitations and leg swelling. Negative for chest pain.   Gastrointestinal: Negative for abdominal distention, abdominal pain, constipation and diarrhea.   Genitourinary: Negative for difficulty urinating.   Musculoskeletal: Positive for arthralgias, back pain and gait problem.   Skin: Negative.    Allergic/Immunologic: Negative for immunocompromised state.   Neurological: Positive for syncope.   Hematological: Bruises/bleeds easily.   Psychiatric/Behavioral: Negative for dysphoric mood. The patient is not nervous/anxious.       Objective:      Physical Exam   Constitutional: He is oriented to person, place, and time. He appears well-developed and well-nourished.   Eyes: Pupils are equal, round, and reactive to light. No scleral icterus.   Neck: No JVD present. No tracheal deviation present.   Cardiovascular: Normal rate and regular rhythm.   Pulmonary/Chest: Effort normal and breath sounds normal.   Abdominal: Soft. Bowel sounds are normal. He exhibits no distension. There is no tenderness. There is no guarding.   Musculoskeletal: Normal range of motion. He exhibits edema.   Neurological: He is alert and oriented to person, place, and time.   Skin: Skin is warm  and dry.   Psychiatric: He has a normal mood and affect. His behavior is normal. Judgment and thought content normal.   Nursing note and vitals reviewed.     Assessment:       1. Portal hypertension due to obstruction of extrahepatic portal vein    2. Other ascites    3. Idiopathic chronic pancreatitis    4. Obesity, Class III, BMI 40-49.9 (morbid obesity)    5. Paroxysmal atrial fibrillation        Laboratory Data:  Neuroendocrine Labs Latest Ref Rng & Units 8/13/2019 8/9/2019 8/8/2019 7/22/2019 7/19/2019 7/15/2019 7/15/2019   FOLATE 4.0 - 24.0 ng/mL - - - - - - -   VITAMIN B12 210 - 950 pg/mL - - - - - - -   TSH 0.400 - 4.000 uIU/mL - - - - - - -   WBC 3.90 - 12.70 K/uL - - - - - 5.70 -   HGB 14.0 - 18.0 g/dL - - - - - 9.8(L) -   HCT 40.0 - 54.0 % - - - - - 32.4(L) -   PLATLETS 150 - 350 K/uL - - - - - 270 -   PT 9.0 - 12.5 sec - - - - - 12.3 -   PTT 21.0 - 32.0 sec - - - - - - -   INR 0.8 - 1.2 - - - - - 1.2 -   GLUCOSE 70 - 110 mg/dL - - - - - 71 -   BUN 8 - 23 mg/dL - - - - - 34(H) -   CREATININE 0.5 - 1.4 mg/dL - - - - - 2.7(H) -    - 145 mmol/L - - - - - 140 -   K 3.5 - 5.1 mmol/L - - - - - 4.1 -   CHLORIDE 95 - 110 mmol/L - - - - - 110 -   CO2 23 - 29 mmol/L - - - - - 25 -   CALCIUM 8.7 - 10.5 mg/dL - - - - - 8.4(L) -   PROTEIN, TOTAL 6.0 - 8.4 g/dL - - - - - 5.4(L) -   PHOSPHORUS 2.7 - 4.5 mg/dL - - - - - 3.4 -   ALBUMIN 3.5 - 5.2 g/dL - - - - - 3.3(L) -   TOTAL BILIRUBIN 0.1 - 1.0 mg/dL - - - - - 0.4 -   DIRECT BILIRUBIN 0.1 - 0.3 mg/dL - - - - - - -   ALK PHOSPHATASE 55 - 135 U/L - - - - - 149(H) -   SGOT (AST) 10 - 40 U/L - - - - - 29 -   SGPT (ALT) 10 - 44 U/L - - - - - 41 -   SERUM AMYLASE 20 - 110 U/L - - - - - - -    - 260 U/L - - - - - - -   TRIGLYCERIDES 30 - 150 mg/dL - - - - - - -   CHOLERSTEROL 120 - 199 mg/dL - - - - - - -   HDL 40 - 75 mg/dL - - - - - - -   LDL 63.0 - 159.0 mg/dL - - - - - - -   MG 1.6 - 2.6 mg/dL - - - - - 1.5(L) -   URINE AMYLASE U/L Not established U/L - - - -  - - -   24 HR URINE PROTEIN 0 - 15 mg/dL - - - - - - -   24 HR CREATININE CLEARANCE 23.0 - 375.0 mg/dL - - - - - - -   HEMOGLOBIN A1C 4.0 - 5.6 % - - - - - - -   Weight - 269 lbs 10 oz 272 lbs 15 oz 264 lbs 264 lbs 9 oz 272 lbs - 268 lbs 5 oz   Sedimentation Rate, Manual 0 - 10 mm/Hr - - - - - - -         Impression:  Extrahepatic portal hypertension and ascites resolved.  Chronic pancreatitis quiescent.   Lower extremity edema persists, mild hepatic fibrosis.  Patient needs to adhere to sodium and water restricted diet    Plan:         Follow-up with PCP  Continue with wound care, physical therapy, and rehab.  2 g sodium 1500 cc a day fluid restriction  RTC 6 months              SON Rowe MD, FACS  Professor of Surgery, [employer]  Neuroendocrine Surgery, Hepatic/Pancreatic & General Surgery  [address], [address]  [place]  [address]  ph. [phone]; [phone]  fax. [phone]

## 2019-08-19 NOTE — PROGRESS NOTES
"  Physical Therapy Daily Treatment Note     Name: Jian Arrieta  Clinic Number: [id number]    Therapy Diagnosis:   Encounter Diagnoses   Name Primary?    Decreased strength     Impaired functional mobility, balance, gait, and endurance      Physician: Liliane Churchill MD    Visit Date: 8/20/2019    Physician Orders: PT Eval and Treat   Medical Diagnosis from Referral: Decreased mobility  Evaluation Date: 8/9/2019  Authorization Period Expiration: 12/31/19  Plan of Care Expiration: 10/4/19  Visit # / Visits authorized: 2/ 50     Time In: 9:00 am   Time Out: 10:00 am  Total Billable Time: 60 minutes (TE-4)     Precautions: Standard, Diabetes, Fall and Cardiac, BLE lymphedema    Subjective     Pt reports: he has been feeling like he is improving and getting stronger every week although still has difficulty with stairs , getting into his truck , and prolonged walking. Pt stated he is tired today although not experiencing any pain.   He was compliant with home exercise program.    Response to previous treatment: no adverse effects - eval  Functional change:  yesterday was the first day he went all day without using his wheelchair    Pain: 0/10 "my stomach hurts , I drank some orange juice that didn't agree with me"   Location: N/A    Objective     Jian received therapeutic exercises to develop strength, endurance, ROM and core stabilization for 60 minutes including:    Supine hip adduction isometric with ball : 2 x 10 reps , 5'' hold  Supine clamshells c/ orange t-band : 2 x 10  Supine bridges : 2 x 10  LTR: x 10 B   Seated HSS: 4 x 15" B  Prone laying hip flexor stretch : 2'  Seated marching 2 x 10 reps B, 3s holds  Seated knee extension, 2 x 10 reps B, 3s holds  Seated heel raises : 2 x 10     Home Exercises Provided and Patient Education Provided     Education provided:   - HEP provided and reviewed in full  - Cues with exercises    Written Home Exercises Provided: yes.  Exercises were reviewed and Jian was able to " demonstrate them prior to the end of the session.  Jian demonstrated good  understanding of the education provided.     See EMR under Patient Instructions for exercises provided 8/20/2019.    Assessment     Pt demonstrated a good tolerance to session today and was able to complete with no adverse effects. Pt required moderate guidance and assist with correct performance of exercises. Pt had some difficulty with staying on task and with cues to redirect pt to performing exercises. Pt denoted a slight strain in his lower back with bridges , although tolerable enough to complete. Will assess pts response to session and progress as tolerated.   Jian is progressing well towards his goals.   Pt prognosis is Good.     Pt will continue to benefit from skilled outpatient physical therapy to address the deficits listed in the problem list box on initial evaluation, provide pt/family education and to maximize pt's level of independence in the home and community environment.   Pt's spiritual, cultural and educational needs considered and pt agreeable to plan of care and goals.    Anticipated barriers to physical therapy: co-morbidities    Goals:  Short Term Goals: 4 weeks   1. Pt will tolerate HEP for improved strength, functional mobility, ROM, posture, and endurance. (progressing, not met)  2. Pt will demo >/= 4/5 strength in BLE's for improved functional mobility, endurance, and posture. (progressing, not met)  3. Pt will reduce TUG (timed up and go) time to </= 17 sec for improved safety with community ambulation and decreased falls risk. (progressing, not met)  4. Pt will perform 9 sit <> stands with use of armrests in 30s for improved endurance. (progressing, not met)  5. Pt will maintain balance in MCTSIB (Modified Clinical Test of Sensory Interaction on Balance) scenario 3 for >/=30s for improved balance/decreased falls risk. (progressing, not met)  6. Pt will report feeling more steady/confident on his feet when  walking at home and in the community for improved functional mobility. (progressing, not met)     Long Term Goals: 8 weeks   1. Pt will be I with updated HEP for improved functional mobility, posture, strength, and endurance. (progressing, not met)  2. Pt will demo 5/5 strength in BLE's for improved functional mobility, endurance, and posture. (progressing, not met)  3. Pt will reduce TUG (timed up and go) time to </= 11 sec for improved safety with community ambulation and decreased falls risk. (progressing, not met)  4. Pt will perform 11 sit <> stands with use of armrests in 30s for improved endurance. (progressing, not met)  5. Pt will maintain balance in MCTSIB (Modified Clinical Test of Sensory Interaction on Balance) scenario 4 for >/=30s for improved balance/decreased falls risk. (progressing, not met)  6. Pt will report/demo negotiating 1 flight of stairs with Mod I for improved functional mobility at home and in the community. (progressing, not met)    Plan     Continue POC. Progress as tolerated.    Raeann Rodriguez, PTA

## 2019-08-19 NOTE — PROGRESS NOTES
Cardiology Clinic Note  Reason for Visit: cardiomyopathy    HPI:     Jian Arrieta is a 64 y.o. M, who presents for follow up.    Since our last visit, he was diagnosed with portal vein thrombosis as etiology for recurrent ascites. He underwent angioplasty and has not required a paracentesis since that time. He reports feeling better than he has in years. He has stopped all medications other than insulin and lasix since his last visit.    Medical: CAD s/p PCI (x5) with subsequent CABG x3v (~2017 at ; LIMA-LAD [patent], SVG-OM [patent], SVG-RCA [occluded; L->R collaterals from LAD to PDA]), cardiomyopathy with LVEF 45%, HLD, DM2, BERHANE on CPAP, R knee OA, Charcot foot, normocytic anemia, recurrent ascites (CT with findings of cirrhosis but biopsy negative; has large pancreatic pseudocyst)  Surgical: sleeve gastrectomy, knee, perianal cyst removal, CABG   Family: DM, Parkinson's, cancers, stroke  Social: former smoker of 2 PPD x30y (quit 2005)     ROS:    Constitution: Negative for fever or chills.  HENT: Negative for  headaches.  Eyes: Negative for blurred vision.   Cardiovascular: See above  Pulmonary: Negative for SOB. Negative for cough.   Gastrointestinal: Negative for nausea/vomiting.   : Negative for dysuria.   Skin: Negative for rashes.  Neurological: Negative for focal weakness.  Psychological: Negative for depression.  PMH:     Past Medical History:   Diagnosis Date    Alcohol abuse     Anasarca 1/28/2019    Arthropathy associated with neurological disorder 9/2/2015    Atherosclerosis     Charcot foot due to diabetes mellitus     Chronic combined systolic and diastolic heart failure 01/29/2019 1-28-19 Left VentricleModerate decreased ejection fraction at 30%. Normal left ventricular cavity size. Normal wall thickness observed. Grade I (mild) left ventricular diastolic dysfunction consistent with impaired relaxation. Normal left atrial pressure. Moderate, global hypokinesis(see wall scoring  diagram). Right VentricleNormal cavity size, wall thickness and ejection fraction. Wall motion n    Chronic pancreatitis 1/28/2019    Colon polyps     approx 5 yrs ago    Coronary artery disease due to calcified coronary lesion 05/08/2015    5 stents on ASA      Diabetic polyneuropathy associated with type 2 diabetes mellitus 9/2/2015    Diverticulosis 1/28/2019    DM type 2 with diabetic peripheral neuropathy 2/4/2019    Encounter for blood transfusion     Essential hypertension 1/28/2019    Former smoker 8/26/2015    Healed ulcer of left foot on examination 6/20/2017    Hydrocele     approx 1.5 yrs ago    Hypoalbuminemia 2/4/2019    Lymphedema of both lower extremities 1/29/2019    Mixed hyperlipidemia 5/8/2015    Morbid obesity with BMI of 50.0-59.9, adult 5/8/2015    Onychomycosis of multiple toenails with type 2 diabetes mellitus and peripheral neuropathy 6/20/2017    Perianal cyst     approx 2 yrs ago    Pseudocyst of pancreas 1/28/2019 1-28-19 Liver has a cirrhotic morphology with no focal lesions.  Significant interval increase in ascites when compared to prior exam which may account for patient's abdominal distension.  Hypodense air-fluid collection along the body of the pancreas which is slightly smaller when compared to prior CT.  Findings may relate to pancreatic necrosis with pancreatic pseudocysts with infected pseudocyst    Skin cancer     skin cancer    Sleep apnea 8/26/2015    Status post bariatric surgery 1/11/2016    Type 2 diabetes mellitus, with long-term current use of insulin 5/8/2015     Past Surgical History:   Procedure Laterality Date    ANGIOGRAM, CORONARY ARTERY Right 3/20/2019    Performed [facility] at [facility] CATH LAB    ANGIOGRAM-PV STENT N/A 6/28/2019    [facility]    ANGIOPLASTY      total x5 stents    Bypass graft study  3/20/2019    Performed [facility] at [facility] CATH LAB    COLONOSCOPY  N/A 10/6/2015    Performed by Shekhar Richards [facility] at [facility] ENDO (2ND FLR)    CORONARY ARTERY BYPASS GRAFT  2017    x3    CYST REMOVAL      ESOPHAGOGASTRODUODENOSCOPY (EGD) N/A 7/8/2019    Performed by Huan [facility]field [facility] at [facility] ENDO    GASTRECTOMY      GASTRECTOMY-SLEEVE-LAPAROSCOPIC - 21817 N/A 12/22/2015    Performed by Micheal Villavicencio Jr., MD at [facility] OR 2ND FLR    KNEE ARTHROSCOPY      perianal surgery      perianal cyst removed    pleurx N/A 5/17/2019    Performed by [facility] Diagnostic Provider at [facility] OR 2ND FLR     Allergies:   Review of patient's allergies indicates:  No Known Allergies  Medications:     Current Outpatient Medications on File Prior to Visit   Medication Sig Dispense Refill    acetaminophen (TYLENOL) 325 MG tablet Take 2 tablets (650 mg total) by mouth every 4 (four) hours as needed.  0    albuterol-ipratropium (DUO-NEB) 2.5 mg-0.5 mg/3 mL nebulizer solution Take 3 mLs by nebulization every 4 (four) hours as needed for Wheezing. Rescue 1 Box 0    amoxicillin-clavulanate 875-125mg (AUGMENTIN) 875-125 mg per tablet Take 1 tablet by mouth 2 (two) times daily for 10 days 20 tablet 0    apixaban (ELIQUIS) 5 mg Tab Take 1 tablet (5 mg total) by mouth 2 (two) times daily. 90 tablet 3    atorvastatin (LIPITOR) 40 MG tablet Take 1 tablet (40 mg total) by mouth once daily. 90 tablet 3    clopidogrel (PLAVIX) 75 mg tablet Take 1 tablet (75 mg total) by mouth once daily. 30 tablet 11    cyanocobalamin, vitamin B-12, 500 mcg Subl Place 1 tablet under the tongue every Monday.       furosemide (LASIX) 20 MG tablet Take 1 tablet (20 mg total) by mouth 2 (two) times daily. 60 tablet 11    insulin aspart U-100 (NOVOLOG) 100 unit/mL injection Inject 4 Units into the skin 3 (three) times daily before meals.      insulin glargine 100 units/mL (3mL) SubQ pen Inject 12 Units into the skin every evening. 15 mL 12    isosorbide mononitrate (IMDUR) 30 MG 24 hr tablet Take 1 tablet (30 mg total) by mouth  once daily. 30 tablet 11    lipase-protease-amylase (CREON) 36,000-114,000- 180,000 unit CpDR Take 1 capsule by mouth 3 (three) times daily. 270 capsule 3    metoclopramide HCl (REGLAN) 10 MG tablet Take 1 tablet (10 mg total) by mouth 3 (three) times daily before meals. 90 tablet 3    metoprolol succinate (TOPROL-XL) 25 MG 24 hr tablet Take 1 tablet (25 mg total) by mouth once daily. 30 tablet 11    midodrine (PROAMATINE) 2.5 MG Tab Take 1 tablet (2.5 mg total) by mouth 3 (three) times daily with meals. 90 tablet 11    omeprazole (PRILOSEC) 40 MG capsule Take 40 mg by mouth once daily.  8    ramelteon (ROZEREM) 8 mg tablet Take 1 tablet (8 mg total) by mouth nightly as needed for Insomnia. 30 tablet 11    senna-docusate 8.6-50 mg (PERICOLACE) 8.6-50 mg per tablet Take 1 tablet by mouth 2 (two) times daily as needed for Constipation.      spironolactone (ALDACTONE) 25 MG tablet Take 25 mg by mouth 2 (two) times daily.      tamsulosin (FLOMAX) 0.4 mg Cap Take 1 capsule by mouth once daily. Pt has not started taking as of 19.  0    [DISCONTINUED] insulin detemir U-100 (LEVEMIR FLEXTOUCH) 100 unit/mL (3 mL) SubQ InPn pen Inject 12 Units into the skin every evening. 3 mL 12     No current facility-administered medications on file prior to visit.      Social History:     Social History     Tobacco Use    Smoking status: Former Smoker     Packs/day: 2.00     Years: 30.00     Pack years: 60.00     Types: Cigarettes     Last attempt to quit: 2005     Years since quittin.5    Smokeless tobacco: Never Used   Substance Use Topics    Alcohol use: No     Comment: started ~, reports 1 shot daily, max 3 shots daily, vague about alcohol consumption. Last drink 2018     Family History:     Family History   Problem Relation Age of Onset    Cancer Mother     Cancer Father     Heart disease Father     Obesity Sister     Parkinsonism Brother     No Known Problems Paternal Grandmother     Cancer  "Paternal Grandfather     Cancer Brother     Diabetes Maternal Grandmother     Stroke Maternal Grandfather     Cirrhosis Neg Hx      Physical Exam:   BP (!) 109/57   Pulse 89   Ht 5' 7" (1.702 m)   Wt 121.3 kg (267 lb 8.5 oz)   BMI 41.90 kg/m²      Constitutional: No apparentr distress, conversant  HEENT: Sclera anicteric, extraocular movements intact  Chest: Well healed surgical scar  Neck: No jugular venous distension, no carotid bruits  CV: Regular rate and rhythm with occasional ectopic beat, distant heart sounds due to body habitus  Pulm: Clear to auscultation bilaterally  GI: Abdomen soft, non-distended  Extremities: 1+ bilateral lower extremity edema wrapped in Daphne boots  Skin: Multiple areas of bruising on extemities  Psych: Alert and oriented to person place location, appropriate affect  Neuro: No focal deficits    Labs:     Blood Tests:  Lab Results   Component Value Date     (H) 06/25/2019     07/15/2019    K 4.1 07/15/2019     07/15/2019    CO2 25 07/15/2019    BUN 34 (H) 07/15/2019    CREATININE 2.7 (H) 07/15/2019    GLU 71 07/15/2019    HGBA1C 7.1 (H) 05/23/2019    MG 1.5 (L) 07/15/2019    AST 29 07/15/2019    ALT 41 07/15/2019    ALBUMIN 3.3 (L) 07/15/2019    PROT 5.4 (L) 07/15/2019    BILITOT 0.4 07/15/2019    WBC 5.70 07/15/2019    HGB 9.8 (L) 07/15/2019    HCT 32.4 (L) 07/15/2019    HCT 32 (L) 01/28/2019    MCV 99 (H) 07/15/2019     07/15/2019    INR 1.2 07/15/2019    TSH 2.211 01/28/2019       Lab Results   Component Value Date    CHOL 85 (L) 01/28/2019    HDL 31 (L) 01/28/2019    TRIG 56 01/28/2019       Lab Results   Component Value Date    LDLCALC 42.8 (L) 01/28/2019       Urine Tests:  Lab Results   Component Value Date    COLORU Yellow 07/09/2019    APPEARANCEUA Clear 07/09/2019    PHUR 6.0 07/09/2019    SPECGRAV 1.020 07/09/2019    PROTEINUA 1+ (A) 07/09/2019    GLUCUA 2+ (A) 07/09/2019    KETONESU Negative 07/09/2019    BILIRUBINUA Negative 07/09/2019    " OCCULTUA Trace (A) 07/09/2019    NITRITE Negative 07/09/2019    UROBILINOGEN Negative 07/09/2019    LEUKOCYTESUR Negative 07/09/2019    PROTEINURINE <7 05/23/2019    CREATRANDUR 18.4 (L) 07/02/2019    UTPCR Unable to calculate 05/23/2019       Imaging:     Echocardiogram  TTE 6/27/19  · Mildly decreased left ventricular systolic function. The estimated ejection fraction is 45-50%  · Left ventricular diastolic dysfunction.  · Normal right ventricular systolic function.  · Normal central venous pressure (3 mm Hg).  · Extremely technically challenging study, poor endocardial visualization, would recommend repeating with contrast if additional information is desired.    TTE 5/23/19  · Normal left ventricular systolic function. The estimated ejection fraction is 65%  · No wall motion abnormalities.  · Normal LV diastolic function.  · Normal right ventricular systolic function.    TTE 3/18/19  · Technically difficult study  · Mildly decreased left ventricular systolic function. The estimated ejection fraction is roughly 40% (difficult to assess even with echo contrast).  · Normal right ventricular systolic function.  · Left ventricular diastolic dysfunction.  · Normal central venous pressure (3 mm Hg).    TTE 1/28/19  · Technically difficult study - BMI > 50  · Moderately decreased left ventricular systolic function. The estimated ejection fraction is 30%  · Normal right ventricular systolic function.  · Grade I (mild) left ventricular diastolic dysfunction consistent with impaired relaxation.    Stress testing  None    Cath Lab  [facility] 3/20/19  · Prox LAD lesion , 95% stenosed.  · Prox Cx to Mid Cx lesion , 75% stenosed.  · Mid Cx to Dist Cx lesion , 90% stenosed.  · Prox RCA  with left to right collaterals from LAD to PDA  · Patent LIMA to LAD  · Patent SVG to OM2  ·  of SVG to RCA  · Estimated blood loss: none    Left Main   There is mild diffuse disease throughout the vessel.   Left Anterior Descending   Prox LAD  lesion is 95% stenosed.   First Diagonal Branch   There is moderate diffuse disease throughout the vessel.   Left Circumflex   Prox Cx to Mid Cx lesion is 75% stenosed. The lesion was previously treated using a stent of unknown type.   Mid Cx to Dist Cx lesion is 90% stenosed. The lesion was previously treated using a stent of unknown type.   First Obtuse Marginal Branch   There is moderate diffuse disease throughout the vessel.   Right Coronary Artery   Prox RCA to Mid RCA lesion is 100% stenosed.   Inferior Septal   Collaterals   Inf Sept filled by collaterals from 2nd Sept.      Graft to 2nd Mrg   LIMA Graft to Mid LAD     Intervention   No interventions have been documented.    Other  None    EK19  Sinus tachycardia  Otherwise normal ECG    19  Sinus rhythm with Fusion complexes  Low voltage QRS  Possible Anterolateral infarct (cited on or before 2019)    19  Sinus tachycardia with occasional Premature ventricular complexes and Fusion complexes  Left anterior fascicular block    Assessment:     1. Hypertension associated with diabetes    2. Hyperlipidemia associated with type 2 diabetes mellitus    3. Coronary artery disease due to calcified coronary lesion    4. Atherosclerosis    5. Paroxysmal atrial fibrillation    6. Venous stasis dermatitis of both lower extremities      Plan:     Cardiomyopathy with LVEF 45-50%  Most recent echo in 2019 with LVEF 45-50%, normal RV systolic function  Recent angiogram with patient LIMA-LAD, SVG-OM; SVG-RCA  with L->R collaterals from LAD  Labile renal function with most recent Cr 2.7 (appears to be his baseline).    Restart toprol 50mg daily.  Will not start ACEi/ARB/entresto due to CKD  Unable to use spironolactone for ascites due to ELIEZER and hyperkalemia.     Coronary artery disease due to calcified coronary lesion  S/p CABG (LIMA-LAD, SVG-OM,  RCA with L->R collaterals - [facility] 3/20/2019)  No ischemic symptoms. With stable disease, will not  restart antiplatelet and continue only on eliquis.  Continue beta blocker, as above.  LDL 42 due to cirrhosis. Will not restart statin at this time.  Check lipid at next lab draw.     Paroxysmal atrial fibrillation  Remains off amiodarone, given liver disease and episode precipitated by large volume paracentesis. In sinus rhythm on recent EKGs.  On beta blocker, as above, for rate control.  Restart eliquis 5mg BID for anticoagulation    Essential hypertension  No longer on anti-hypertensives.  Now with low blood pressures.    Mixed hyperlipidemia  LDL 42 in 1/2019    Lymphedema  Prior success with brinda boots.    Chronic kidney disease  Baseline Cr ~2.7    Signed:  Samuel Carney MD  Cardiology     8/26/2019 11:35 AM    Follow-up:     Future Appointments   Date Time Provider Department Center   8/20/2019  9:00 AM Raeann Rodriguez, PTA [facility]   8/20/2019  1:00 PM NURSE, VISIT [facility] WOUND [facility]   8/22/2019  8:40 AM Leon Barajas DO [facility] IM [place]   8/22/2019 11:00 AM Raeann Rodriguez, PTA [facility]   8/26/2019 11:00 AM Jaime Carney III, MD [facility] CARDIO [place]   8/27/2019  9:00 AM Raeann Rodriguez, PTA [facility]   8/29/2019  9:00 AM Raeann Rodriguez, PTA [facility]   9/3/2019  9:00 AM Raeann Rodriguez, PTA [facility]   9/5/2019 11:00 AM Dora Mendez, PT [facility]   9/10/2019 11:00 AM Dora Mendez, PT [facility]   9/12/2019 11:00 AM Dora Mendez, PT [facility]   11/15/2019 10:40 AM Leon Barajas DO [facility] IM [place]   2/21/2020 11:00 AM SON Rowe MD [facility] TUMOR [facility]

## 2019-08-20 ENCOUNTER — HOSPITAL ENCOUNTER (OUTPATIENT)
Dept: WOUND CARE | Facility: HOSPITAL | Age: 64
Discharge: HOME | End: 2019-08-20
Attending: PODIATRIST
Payer: COMMERCIAL

## 2019-08-20 ENCOUNTER — CLINICAL SUPPORT (OUTPATIENT)
Dept: REHABILITATION | Facility: HOSPITAL | Age: 64
End: 2019-08-20
Payer: COMMERCIAL

## 2019-08-20 VITALS
SYSTOLIC BLOOD PRESSURE: 90 MMHG | WEIGHT: 269 LBS | HEIGHT: 67 IN | HEART RATE: 99 BPM | RESPIRATION RATE: 20 BRPM | BODY MASS INDEX: 42.22 KG/M2 | DIASTOLIC BLOOD PRESSURE: 57 MMHG | TEMPERATURE: 98 F

## 2019-08-20 DIAGNOSIS — Z74.09 IMPAIRED FUNCTIONAL MOBILITY, BALANCE, GAIT, AND ENDURANCE: ICD-10-CM

## 2019-08-20 DIAGNOSIS — L03.116 CELLULITIS OF LEFT LEG: Primary | ICD-10-CM

## 2019-08-20 DIAGNOSIS — L03.115 CELLULITIS OF RIGHT LEG: ICD-10-CM

## 2019-08-20 DIAGNOSIS — R53.1 DECREASED STRENGTH: ICD-10-CM

## 2019-08-20 PROCEDURE — 29581 APPL MULTLAYER CMPRN SYS LEG: CPT

## 2019-08-20 PROCEDURE — 97110 THERAPEUTIC EXERCISES: CPT | Mod: PO

## 2019-08-20 NOTE — PROGRESS NOTES
"[facility]  [address]  [place] [address]  Nurse Visit    Subjective:     Patient seen in clinic today.  Dressing changed as ordered.    8/8/2019: Pt presents to wound care clinic referred from [facility]. History of T2DM with neuropathy, PAF, HTN, HLD, CKD IV, Atherosclerosis, Anemia, Morbid Obesity, Chronic LE lymphedema, BERHANE, Ascites/portal HTN, Chronic pancreatitis. Per records, "Pt presented to [facility] 6/27/19 as a transfer from [facility] with large volume ascites 2/2 portal vein occlusion requiring IR procedure for recannalization." Patient initially presented to [facility] for worsening RLE pain and bilateral lower extremity lymphedema contributing to progressive immobility combined with wife and sister no longer being able to care for patient. Patient received vancomycin for concern for RLE cellulitis and also had US of BLE which were negative for DVT. New admit for Dr. Jeansonne. Pedal pulses and bilateral lower extremities edema +3 present. Bilateral lower extremities 4 layer compression wrap applied as per Dr. Jeansonne orders'. Education on wound care dressing provided to MR. Arrieta, voices understanding.    8/20/2019: Denies any pain or discomfort, bilateral lower extremities 4 compression wrap applied as ordered.    Assessment:          Wound 08/08/19 1700 Blister(s) upper Leg #1 (Active)   08/08/19 1700    Pre-existing: Yes   Primary Wound Type: Blister(s)   Side: Left   Orientation: upper   Location: Leg   Wound/PI Number (optional): #1   Ankle-Brachial Index:    Pulses: present per doppler   Removal Indication and Assessment:    Wound Outcome:    (Retired) Wound Type:    (Retired) Wound Length (cm):    (Retired) Wound Width (cm):    (Retired) Depth (cm):    Wound Description (Comments):    Removal Indications:    Dressing Appearance Intact;Clean 8/20/2019  1:00 PM   Drainage Amount Small 8/20/2019  1:00 PM   Drainage " Characteristics/Odor Serosanguineous 8/20/2019  1:00 PM   Appearance Pink;Maroon;Yellow;Moist 8/20/2019  1:00 PM   Tissue loss description Full thickness 8/20/2019  1:00 PM   Red (%), Wound Tissue Color 100 % 8/20/2019  1:00 PM   Periwound Area Intact;Redness;Edematous 8/20/2019  1:00 PM   Wound Edges Defined 8/20/2019  1:00 PM   Wound Length (cm) 1.2 cm 8/20/2019  1:00 PM   Wound Width (cm) 1 cm 8/20/2019  1:00 PM   Wound Depth (cm) 0.1 cm 8/20/2019  1:00 PM   Wound Volume (cm^3) 0.12 cm^3 8/20/2019  1:00 PM   Wound Surface Area (cm^2) 1.2 cm^2 8/20/2019  1:00 PM   Care Cleansed with:;Sterile normal saline 8/20/2019  1:00 PM   Dressing Applied;Calcium alginate;Compression wrap 8/20/2019  1:00 PM   Periwound Care Moisture barrier applied 8/20/2019  1:00 PM   Compression Four layer compression 8/20/2019  1:00 PM   Dressing Change Due 08/22/19 8/20/2019  1:00 PM            Wound 08/08/19 1714 Blister(s) anterior Leg #2 (Active)   08/08/19 1714    Pre-existing: Yes   Primary Wound Type: Blister(s)   Side: Left   Orientation: anterior   Location: Leg   Wound/PI Number (optional): #2   Ankle-Brachial Index:    Pulses: pedal pulses present per doppler   Removal Indication and Assessment:    Wound Outcome:    (Retired) Wound Type:    (Retired) Wound Length (cm):    (Retired) Wound Width (cm):    (Retired) Depth (cm):    Wound Description (Comments):    Removal Indications:    Dressing Appearance Intact;Moist drainage 8/20/2019  1:00 PM   Drainage Amount Moderate 8/20/2019  1:00 PM   Drainage Characteristics/Odor Serosanguineous 8/20/2019  1:00 PM   Appearance Red;Pink 8/20/2019  1:00 PM   Tissue loss description Full thickness 8/20/2019  1:00 PM   Red (%), Wound Tissue Color 100 % 8/20/2019  1:00 PM   Periwound Area Intact;Redness;Edematous 8/20/2019  1:00 PM   Wound Edges Defined 8/20/2019  1:00 PM   Wound Length (cm) 1 cm 8/20/2019  1:00 PM   Wound Width (cm) 2 cm 8/20/2019  1:00 PM   Wound Depth (cm) 0.3 cm 8/20/2019   1:00 PM   Wound Volume (cm^3) 0.6 cm^3 8/20/2019  1:00 PM   Wound Surface Area (cm^2) 2 cm^2 8/20/2019  1:00 PM   Care Cleansed with:;Sterile normal saline 8/20/2019  1:00 PM   Dressing Applied;Calcium alginate;Compression wrap 8/20/2019  1:00 PM   Periwound Care Moisture barrier applied 8/20/2019  1:00 PM   Compression Four layer compression 8/20/2019  1:00 PM   Dressing Change Due 08/22/19 8/20/2019  1:00 PM            Wound 08/08/19 1700 Other (comment) anterior Leg #3 (Active)   08/08/19 1700    Pre-existing: Yes   Primary Wound Type: Other   Side: Right   Orientation: anterior   Location: Leg   Wound/PI Number (optional): #3   Ankle-Brachial Index:    Pulses:    Removal Indication and Assessment:    Wound Outcome:    (Retired) Wound Type:    (Retired) Wound Length (cm):    (Retired) Wound Width (cm):    (Retired) Depth (cm):    Wound Description (Comments):    Removal Indications:    Dressing Appearance Dry;Intact 8/20/2019  1:00 PM   Drainage Amount None 8/20/2019  1:00 PM   Red (%), Wound Tissue Color 100 % 8/20/2019  1:00 PM   Periwound Area Edematous 8/20/2019  1:00 PM   Wound Length (cm) 0 cm 8/20/2019  1:00 PM   Wound Width (cm) 0 cm 8/20/2019  1:00 PM   Wound Depth (cm) 0 cm 8/20/2019  1:00 PM   Wound Volume (cm^3) 0 cm^3 8/20/2019  1:00 PM   Wound Surface Area (cm^2) 0 cm^2 8/20/2019  1:00 PM   Care Cleansed with:;Sterile normal saline 8/20/2019  1:00 PM   Dressing Applied;Calcium alginate;Compression wrap 8/20/2019  1:00 PM   Periwound Care Moisture barrier applied 8/20/2019  1:00 PM   Compression Four layer compression 8/20/2019  1:00 PM   Dressing Change Due 08/22/19 8/20/2019  1:00 PM         ICD-10-CM ICD-9-CM   1. Cellulitis of left leg L03.116 682.6   2. Cellulitis of right leg L03.115 682.6         Cleanse Bilateral lower extremities with normal saline.  Periwound care: sween cream bilateral dry lower extremities  Primary dressing: four layer compression wrap bilateral lower legs. Left leg open  wounds apply aquacel extra before compression wraps.  Edema control: Elevate lower extremities  Frequency: once a week    Plan:     Follow up in about 2 days (around 8/22/2019).

## 2019-08-22 ENCOUNTER — OFFICE VISIT (OUTPATIENT)
Dept: INTERNAL MEDICINE | Facility: CLINIC | Age: 64
End: 2019-08-22
Payer: COMMERCIAL

## 2019-08-22 ENCOUNTER — HOSPITAL ENCOUNTER (OUTPATIENT)
Dept: WOUND CARE | Facility: HOSPITAL | Age: 64
Discharge: HOME | End: 2019-08-22
Attending: FAMILY MEDICINE
Payer: COMMERCIAL

## 2019-08-22 VITALS
BODY MASS INDEX: 41.91 KG/M2 | WEIGHT: 267 LBS | HEIGHT: 67 IN | SYSTOLIC BLOOD PRESSURE: 123 MMHG | RESPIRATION RATE: 16 BRPM | DIASTOLIC BLOOD PRESSURE: 73 MMHG | HEART RATE: 95 BPM | TEMPERATURE: 98 F

## 2019-08-22 VITALS
DIASTOLIC BLOOD PRESSURE: 59 MMHG | BODY MASS INDEX: 41.75 KG/M2 | SYSTOLIC BLOOD PRESSURE: 111 MMHG | HEART RATE: 47 BPM | WEIGHT: 266 LBS | HEIGHT: 67 IN | TEMPERATURE: 98 F

## 2019-08-22 DIAGNOSIS — Z91.199 MEDICALLY NONCOMPLIANT: ICD-10-CM

## 2019-08-22 DIAGNOSIS — L03.116 CELLULITIS AND ABSCESS OF LEFT LEG: ICD-10-CM

## 2019-08-22 DIAGNOSIS — L02.416 CELLULITIS AND ABSCESS OF LEFT LEG: ICD-10-CM

## 2019-08-22 DIAGNOSIS — L03.115 CELLULITIS OF RIGHT LEG: ICD-10-CM

## 2019-08-22 DIAGNOSIS — I89.0 LYMPHEDEMA OF BOTH LOWER EXTREMITIES: Primary | ICD-10-CM

## 2019-08-22 DIAGNOSIS — E11.42 DM TYPE 2 WITH DIABETIC PERIPHERAL NEUROPATHY: Primary | ICD-10-CM

## 2019-08-22 PROCEDURE — 1101F PT FALLS ASSESS-DOCD LE1/YR: CPT | Mod: CPTII,S$GLB,, | Performed by: INTERNAL MEDICINE

## 2019-08-22 PROCEDURE — 3045F PR MOST RECENT HEMOGLOBIN A1C LEVEL 7.0-9.0%: CPT | Mod: CPTII,S$GLB,, | Performed by: INTERNAL MEDICINE

## 2019-08-22 PROCEDURE — 3074F SYST BP LT 130 MM HG: CPT | Mod: CPTII,S$GLB,, | Performed by: INTERNAL MEDICINE

## 2019-08-22 PROCEDURE — 99213 PR OFFICE/OUTPT VISIT, EST, LEVL III, 20-29 MIN: ICD-10-PCS | Mod: S$GLB,,, | Performed by: INTERNAL MEDICINE

## 2019-08-22 PROCEDURE — 99999 PR PBB SHADOW E&M-EST. PATIENT-LVL III: CPT | Mod: PBBFAC,,, | Performed by: INTERNAL MEDICINE

## 2019-08-22 PROCEDURE — 99999 PR PBB SHADOW E&M-EST. PATIENT-LVL III: ICD-10-PCS | Mod: PBBFAC,,, | Performed by: INTERNAL MEDICINE

## 2019-08-22 PROCEDURE — 29581 APPL MULTLAYER CMPRN SYS LEG: CPT

## 2019-08-22 PROCEDURE — 3008F PR BODY MASS INDEX (BMI) DOCUMENTED: ICD-10-PCS | Mod: CPTII,S$GLB,, | Performed by: INTERNAL MEDICINE

## 2019-08-22 PROCEDURE — 3045F PR MOST RECENT HEMOGLOBIN A1C LEVEL 7.0-9.0%: ICD-10-PCS | Mod: CPTII,S$GLB,, | Performed by: INTERNAL MEDICINE

## 2019-08-22 PROCEDURE — 1101F PR PT FALLS ASSESS DOC 0-1 FALLS W/OUT INJ PAST YR: ICD-10-PCS | Mod: CPTII,S$GLB,, | Performed by: INTERNAL MEDICINE

## 2019-08-22 PROCEDURE — 3078F DIAST BP <80 MM HG: CPT | Mod: CPTII,S$GLB,, | Performed by: INTERNAL MEDICINE

## 2019-08-22 PROCEDURE — 3074F PR MOST RECENT SYSTOLIC BLOOD PRESSURE < 130 MM HG: ICD-10-PCS | Mod: CPTII,S$GLB,, | Performed by: INTERNAL MEDICINE

## 2019-08-22 PROCEDURE — 3008F BODY MASS INDEX DOCD: CPT | Mod: CPTII,S$GLB,, | Performed by: INTERNAL MEDICINE

## 2019-08-22 PROCEDURE — 99213 OFFICE O/P EST LOW 20 MIN: CPT | Mod: S$GLB,,, | Performed by: INTERNAL MEDICINE

## 2019-08-22 PROCEDURE — 3078F PR MOST RECENT DIASTOLIC BLOOD PRESSURE < 80 MM HG: ICD-10-PCS | Mod: CPTII,S$GLB,, | Performed by: INTERNAL MEDICINE

## 2019-08-22 NOTE — PROGRESS NOTES
"Subjective:       Patient ID: Jian Arrieta is a 64 y.o. male.    Chief Complaint: Non-healing Wound Follow Up and Venous Stasis    Chief Complaint: Non-healing Wound     8/8/2019: Pt presents to wound care clinic referred from [facility]. History of T2DM with neuropathy, PAF, HTN, HLD, CKD IV, Atherosclerosis, Anemia, Morbid Obesity, Chronic LE lymphedema, BERHANE, Ascites/portal HTN, Chronic pancreatitis. Per records, "Pt presented to [facility] 6/27/19 as a transfer from [facility] with large volume ascites 2/2 portal vein occlusion requiring IR procedure for recannalization." Patient initially presented to [facility] for worsening RLE pain and bilateral lower extremity lymphedema contributing to progressive immobility combined with wife and sister no longer being able to care for patient. Patient received vancomycin for concern for RLE cellulitis and also had US of BLE which were negative for DVT. New admit for Dr. Jeansonne. Pedal pulses and bilateral lower extremities edema +3 present. Bilateral lower extremities 4 layer compression wrap applied as per Dr. Jeansonne orders'. Education on wound care dressing provided to MR. Arrieta, voices understanding.  8/13/19:  Nurse visit for dressing change.  Profore 4 layer multi compression wrap to BLEs from toe to knee.  Patient tolerated well.    8/22/19: Patient seen today in clinic by Dr. Cotton covering for Dr. Jeansonne, for cellulitis of BLE and abscess of LLE.   Wounds improving, no changes to wound care. Four layer compression wrap applied toe to knee BLE , patient tolerated well.  Patient reports to Dr. Cotton not using his lymphedema pumps due to them being to hard to get on and off and not having help with them.  Dr. Cotton encouraged patient to use lymphemedma pumps and elevate legs as much as possible patient verbalized understanding.    Review of Systems    Objective:      Physical Exam    Assessment:       No " diagnosis found.       Wound 08/08/19 1700 Blister(s) upper Leg #1 (Active)   08/08/19 1700    Pre-existing: Yes   Primary Wound Type: Blister(s)   Side: Left   Orientation: upper   Location: Leg   Wound/PI Number (optional): #1   Ankle-Brachial Index:    Pulses: present per doppler   Removal Indication and Assessment:    Wound Outcome:    (Retired) Wound Type:    (Retired) Wound Length (cm):    (Retired) Wound Width (cm):    (Retired) Depth (cm):    Wound Description (Comments):    Removal Indications:    Wound Image    8/22/2019  3:00 PM   Dressing Appearance Open to air;No dressing 8/22/2019  3:00 PM   Drainage Amount Scant 8/22/2019  3:00 PM   Drainage Characteristics/Odor Serosanguineous 8/22/2019  3:00 PM   Appearance Pink;Red 8/22/2019  3:00 PM   Tissue loss description Partial thickness 8/22/2019  3:00 PM   Red (%), Wound Tissue Color 100 % 8/22/2019  3:00 PM   Periwound Area Intact;Dry;Redness;Edematous 8/22/2019  3:00 PM   Wound Edges Defined 8/22/2019  3:00 PM   Wound Length (cm) 0.2 cm 8/22/2019  3:00 PM   Wound Width (cm) 0.4 cm 8/22/2019  3:00 PM   Wound Depth (cm) 0.1 cm 8/22/2019  3:00 PM   Wound Volume (cm^3) 0.01 cm^3 8/22/2019  3:00 PM   Wound Surface Area (cm^2) 0.08 cm^2 8/22/2019  3:00 PM   Care Cleansed with:;Soap and water;Sterile normal saline 8/22/2019  3:00 PM   Dressing Applied;Changed;Calcium alginate;Silver;Compression wrap 8/22/2019  3:00 PM   Periwound Care Absorptive dressing applied;Moisturizer applied 8/22/2019  3:00 PM   Compression Four layer compression 8/22/2019  3:00 PM   Dressing Change Due 08/29/19 8/22/2019  3:00 PM            Wound 08/08/19 1714 Blister(s) anterior Leg #2 (Active)   08/08/19 1714    Pre-existing: Yes   Primary Wound Type: Blister(s)   Side: Left   Orientation: anterior   Location: Leg   Wound/PI Number (optional): #2   Ankle-Brachial Index:    Pulses: pedal pulses present per doppler   Removal Indication and Assessment:    Wound Outcome:    (Retired) Wound  Type:    (Retired) Wound Length (cm):    (Retired) Wound Width (cm):    (Retired) Depth (cm):    Wound Description (Comments):    Removal Indications:    Dressing Appearance Open to air;No dressing 8/22/2019  3:00 PM   Drainage Amount Scant 8/22/2019  3:00 PM   Drainage Characteristics/Odor Serosanguineous 8/22/2019  3:00 PM   Appearance Pink;Red 8/22/2019  3:00 PM   Tissue loss description Partial thickness 8/22/2019  3:00 PM   Red (%), Wound Tissue Color 100 % 8/22/2019  3:00 PM   Periwound Area Intact;Edematous;Dry;Redness 8/22/2019  3:00 PM   Wound Edges Defined 8/22/2019  3:00 PM   Wound Length (cm) 0.2 cm 8/22/2019  3:00 PM   Wound Width (cm) 0.2 cm 8/22/2019  3:00 PM   Wound Depth (cm) 0.1 cm 8/22/2019  3:00 PM   Wound Volume (cm^3) 0 cm^3 8/22/2019  3:00 PM   Wound Surface Area (cm^2) 0.04 cm^2 8/22/2019  3:00 PM   Care Cleansed with:;Sterile normal saline 8/22/2019  3:00 PM   Dressing Changed;Applied;Calcium alginate;Silver;Compression wrap 8/22/2019  3:00 PM   Periwound Care Absorptive dressing applied;Moisturizer applied 8/22/2019  3:00 PM   Compression Four layer compression 8/22/2019  3:00 PM   Dressing Change Due 08/29/19 8/22/2019  3:00 PM            Wound 08/08/19 1700 Other (comment) anterior Leg #3 (Active)   08/08/19 1700    Pre-existing: Yes   Primary Wound Type: Other   Side: Right   Orientation: anterior   Location: Leg   Wound/PI Number (optional): #3   Ankle-Brachial Index:    Pulses:    Removal Indication and Assessment:    Wound Outcome:    (Retired) Wound Type:    (Retired) Wound Length (cm):    (Retired) Wound Width (cm):    (Retired) Depth (cm):    Wound Description (Comments):    Removal Indications:    Wound Image   8/22/2019  3:00 PM   Dressing Appearance Open to air;No dressing 8/22/2019  3:00 PM   Red (%), Wound Tissue Color 100 % 8/22/2019  3:00 PM   Periwound Area Redness;Dry;Edematous 8/22/2019  3:00 PM   Wound Length (cm) 0 cm 8/22/2019  3:00 PM   Wound Width (cm) 0 cm 8/22/2019   3:00 PM   Wound Depth (cm) 0 cm 8/22/2019  3:00 PM   Wound Volume (cm^3) 0 cm^3 8/22/2019  3:00 PM   Wound Surface Area (cm^2) 0 cm^2 8/22/2019  3:00 PM   Care Cleansed with:;Soap and water;Sterile normal saline 8/22/2019  3:00 PM   Dressing Changed;Applied;Compression wrap 8/22/2019  3:00 PM   Periwound Care Moisturizer applied 8/22/2019  3:00 PM   Compression Four layer compression 8/22/2019  3:00 PM   Dressing Change Due 08/29/19 8/22/2019  3:00 PM       Cleanse Bilateral lower extremities with normal saline.  Periwound care: sween cream bilateral dry lower extremities  Primary dressing: four layer compression wrap bilateral lower legs. Left leg open wounds apply aquacel extra before compression wraps.  Edema control: Elevate lower extremities  Frequency: once a week  Follow-up: 8/29/19 with Dr. Jeansonne              Plan:                8/29/19: Dr. Jeansonne

## 2019-08-22 NOTE — PROGRESS NOTES
Subjective:       Patient ID: Jian Arrieta is a 64 y.o. male.    Chief Complaint: Follow-up    HPI   Pt here for 1 wk f/u regarding DM. His blood sugars at home are running from the low 100s up to the mid 200s. He is not always on a diabetic diet.     Pt also tells me he stopped all his meds except for the insulin.   Review of Systems   Constitutional: Negative for activity change, appetite change, chills, diaphoresis, fatigue, fever and unexpected weight change.   HENT: Negative for postnasal drip, rhinorrhea, sinus pressure, sneezing, sore throat, trouble swallowing and voice change.    Respiratory: Negative for cough, shortness of breath and wheezing.    Cardiovascular: Negative for chest pain, palpitations and leg swelling.   Gastrointestinal: Negative for abdominal pain, blood in stool, constipation, diarrhea, nausea and vomiting.   Genitourinary: Negative for dysuria.   Musculoskeletal: Negative for arthralgias and myalgias.   Skin: Negative for rash and wound.   Allergic/Immunologic: Negative for environmental allergies and food allergies.   Hematological: Negative for adenopathy. Does not bruise/bleed easily.       Objective:      Physical Exam   Constitutional: He is oriented to person, place, and time. He appears well-developed and well-nourished. No distress.   HENT:   Head: Normocephalic and atraumatic.   Eyes: Pupils are equal, round, and reactive to light. Conjunctivae and EOM are normal. Right eye exhibits no discharge. Left eye exhibits no discharge. No scleral icterus.   Neck: Normal range of motion. Neck supple. No JVD present.   Cardiovascular: Normal rate, regular rhythm and normal heart sounds.   No murmur heard.  Pulmonary/Chest: Effort normal and breath sounds normal. No respiratory distress. He has no wheezes. He has no rales.   Musculoskeletal: He exhibits no edema.   Lymphadenopathy:     He has no cervical adenopathy.   Neurological: He is alert and oriented to person, place, and time.    Skin: Skin is warm and dry. No rash noted. He is not diaphoretic. No pallor.       Assessment:       1. DM type 2 with diabetic peripheral neuropathy    2. Medically noncompliant        Plan:    1. Fair control per logs with last A1C of 7.1       CPT   2. Medical noncompliance- he was strongly advised to restart all his meds

## 2019-08-23 ENCOUNTER — HOSPITAL ENCOUNTER (EMERGENCY)
Facility: HOSPITAL | Age: 64
Discharge: HOME | End: 2019-08-23
Attending: EMERGENCY MEDICINE
Payer: COMMERCIAL

## 2019-08-23 VITALS
HEART RATE: 80 BPM | TEMPERATURE: 98 F | RESPIRATION RATE: 16 BRPM | WEIGHT: 270 LBS | DIASTOLIC BLOOD PRESSURE: 65 MMHG | OXYGEN SATURATION: 100 % | HEIGHT: 67 IN | BODY MASS INDEX: 42.38 KG/M2 | SYSTOLIC BLOOD PRESSURE: 135 MMHG

## 2019-08-23 DIAGNOSIS — E16.2 HYPOGLYCEMIA: Primary | ICD-10-CM

## 2019-08-23 LAB
POCT GLUCOSE: 128 MG/DL (ref 70–110)
POCT GLUCOSE: 83 MG/DL (ref 70–110)
POCT GLUCOSE: 92 MG/DL (ref 70–110)

## 2019-08-23 PROCEDURE — 82962 GLUCOSE BLOOD TEST: CPT

## 2019-08-23 PROCEDURE — 99282 EMERGENCY DEPT VISIT SF MDM: CPT

## 2019-08-23 NOTE — ED PROVIDER NOTES
Encounter Date: 8/23/2019       History     Chief Complaint   Patient presents with    Hypoglycemia     Patient called EMS that he was not feeling well.  On EMS arrival, patient's blood sugar was 56.  Was given an amp D50 PTA by EMS.     Afebrile 64-year-old male with PMH of insulin-dependent diabetes mellitus, diverticulosis, atherosclerosis, alcohol abuse, chronic pancreatitis, CAD, HTN, HLD, obesity, skin cancer, sleep apnea and hypoalbuminemia presents the ED for evaluation of hypoglycemia.  Patient states that he called EMS as he felt like his blood sugar was low.  He states that this has happened in the past and that he knows exactly what was going on today.  He states that he felt clammy, diaphoretic and some generalized weakness. He does report that he took his insulin (Levemir and NovoLog) today and did not eat anything.  EMS reports on arrival the patient's blood sugar was noted to be 56.  He was given D50 and blood sugar upon arrival here is 83.  Patient denies any complaints and states that he is feeling much better.  He denies any syncope, recent head trauma, chest pain, shortness of breath, palpitations, vomiting or diarrhea.  He endorses a history of medication noncompliance but states that he takes his insulin could he does not want his blood sugar to be high.    The history is provided by the patient.     Review of patient's allergies indicates:  No Known Allergies  Past Medical History:   Diagnosis Date    Alcohol abuse     Anasarca 1/28/2019    Arthropathy associated with neurological disorder 9/2/2015    Atherosclerosis     Charcot foot due to diabetes mellitus     Chronic combined systolic and diastolic heart failure 01/29/2019 1-28-19 Left VentricleModerate decreased ejection fraction at 30%. Normal left ventricular cavity size. Normal wall thickness observed. Grade I (mild) left ventricular diastolic dysfunction consistent with impaired relaxation. Normal left atrial pressure.  Moderate, global hypokinesis(see wall scoring diagram). Right VentricleNormal cavity size, wall thickness and ejection fraction. Wall motion n    Chronic pancreatitis 1/28/2019    Colon polyps     approx 5 yrs ago    Coronary artery disease due to calcified coronary lesion 05/08/2015    5 stents on ASA      Diabetic polyneuropathy associated with type 2 diabetes mellitus 9/2/2015    Diverticulosis 1/28/2019    DM type 2 with diabetic peripheral neuropathy 2/4/2019    Encounter for blood transfusion     Essential hypertension 1/28/2019    Former smoker 8/26/2015    Healed ulcer of left foot on examination 6/20/2017    Hydrocele     approx 1.5 yrs ago    Hypoalbuminemia 2/4/2019    Lymphedema of both lower extremities 1/29/2019    Mixed hyperlipidemia 5/8/2015    Morbid obesity with BMI of 50.0-59.9, adult 5/8/2015    Onychomycosis of multiple toenails with type 2 diabetes mellitus and peripheral neuropathy 6/20/2017    Perianal cyst     approx 2 yrs ago    Pseudocyst of pancreas 1/28/2019 1-28-19 Liver has a cirrhotic morphology with no focal lesions.  Significant interval increase in ascites when compared to prior exam which may account for patient's abdominal distension.  Hypodense air-fluid collection along the body of the pancreas which is slightly smaller when compared to prior CT.  Findings may relate to pancreatic necrosis with pancreatic pseudocysts with infected pseudocyst    Skin cancer     skin cancer    Sleep apnea 8/26/2015    Status post bariatric surgery 1/11/2016    Type 2 diabetes mellitus, with long-term current use of insulin 5/8/2015     Past Surgical History:   Procedure Laterality Date    ANGIOGRAM, CORONARY ARTERY Right 3/20/2019    [facility] at [facility] CATH LAB    ANGIOGRAM-PV STENT N/A 6/28/2019    Performed by [facility] Provider at [facility] OR    ANGIOPLASTY      total x5 stents    Bypass graft study  3/20/2019    Performed by Bob  Larry Duque MD at [facility] CATH LAB    COLONOSCOPY N/A 10/6/2015    Performed by Shekhar Richards MD at [facility] ENDO (2ND FLR)    CORONARY ARTERY BYPASS GRAFT  2017    x3    CYST REMOVAL      ESOPHAGOGASTRODUODENOSCOPY (EGD) N/A 2019    Performed by Huan Brumfield MD at [facility] ENDO    GASTRECTOMY      GASTRECTOMY-SLEEVE-LAPAROSCOPIC - 65822 N/A 2015    Performed by Micheal Villavicencio Jr., MD at [facility] OR 2ND FLR    KNEE ARTHROSCOPY      perianal surgery      perianal cyst removed    pleurx N/A 2019    Performed by [facility] Diagnostic Provider at [facility] OR 2ND FLR     Family History   Problem Relation Age of Onset    Cancer Mother     Cancer Father     Heart disease Father     Obesity Sister     Parkinsonism Brother     No Known Problems Paternal Grandmother     Cancer Paternal Grandfather     Cancer Brother     Diabetes Maternal Grandmother     Stroke Maternal Grandfather     Cirrhosis Neg Hx      Social History     Tobacco Use    Smoking status: Former Smoker     Packs/day: 2.00     Years: 30.00     Pack years: 60.00     Types: Cigarettes     Last attempt to quit: 2005     Years since quittin.5    Smokeless tobacco: Never Used   Substance Use Topics    Alcohol use: No     Comment: started ~, reports 1 shot daily, max 3 shots daily, vague about alcohol consumption. Last drink 2018    Drug use: No     Review of Systems   Constitutional: Positive for diaphoresis. Negative for fever.   HENT: Negative for sore throat.    Eyes: Negative for visual disturbance.   Respiratory: Negative for chest tightness and shortness of breath.    Cardiovascular: Negative for chest pain and palpitations.   Gastrointestinal: Negative for nausea and vomiting.   Genitourinary: Negative for decreased urine volume.   Musculoskeletal: Negative for arthralgias, back pain and myalgias.   Skin: Negative for rash.   Neurological: Positive for weakness (Generalized) and light-headedness. Negative for dizziness  and syncope.   Hematological: Does not bruise/bleed easily.   Psychiatric/Behavioral: Negative for confusion.       Physical Exam     Initial Vitals [08/23/19 1315]   BP Pulse Resp Temp SpO2   133/62 78 18 97.8 °F (36.6 °C) 100 %      MAP       --         Physical Exam    Nursing note and vitals reviewed.  Constitutional: Vital signs are normal. He appears well-developed and well-nourished. He is cooperative.  Non-toxic appearance. He does not appear ill. No distress.   HENT:   Head: Normocephalic and atraumatic.   Eyes: Conjunctivae and lids are normal.   Neck: Trachea normal and normal range of motion. Neck supple. No stridor present.   Cardiovascular: Normal rate and regular rhythm.   Pulmonary/Chest: No respiratory distress. He has no wheezes. He has no rhonchi.   Abdominal: Soft. Normal appearance. There is no tenderness.   Musculoskeletal: Normal range of motion.   Neurological: He is alert and oriented to person, place, and time. He has normal strength. GCS score is 15. GCS eye subscore is 4. GCS verbal subscore is 5. GCS motor subscore is 6.   Skin: Skin is warm, dry and intact. No rash noted.   Psychiatric: He has a normal mood and affect. His speech is normal and behavior is normal. Thought content normal.         ED Course   Procedures  Labs Reviewed   POCT GLUCOSE   POCT GLUCOSE          Imaging Results    None          Medical Decision Making:   Initial Assessment:   64-year-old male presents the ED for evaluation of hypoglycemia.  Patient is alert and oriented x3 with no complaints at this time.  He does state that his symptoms have resolved.  Physical exam is grossly unremarkable at this time.  Differential Diagnosis:   Differential Diagnosis includes, but is not limited to:  Decreased PO intake, nausea/vomiting, dehydration, metabolic derangement, infection/sepsis, UTI, pneumonia, medication side effect, medication non-compliance, device malfunction, DKA, ACS/MI, alcohol/drug abuse.    Clinical  Tests:   Lab Tests: Ordered and Reviewed  ED Management:  Reported care glucose upon arrival noted to be 83.  Patient once again states that all symptoms have resolved and that he is feeling well. He would not like any blood work completed as he states that he knows it was related to his low blood sugar and that he is feeling well. He appears nontoxic and in no distress with stable vitals and no additional complaints at this time.  He was offered a meal and obliged.  Recheck of blood sugar was 128.  He was counseled at length about appropriate insulin use and the importance of eating.  He is also counseled once again on importance of medication compliance. Strict instructions to follow up with primary care physician or reference provided for further assessment and evaluation. Given instructions to return for any acute symptoms and verbalized understanding of this medical plan.                        Clinical Impression:       ICD-10-CM ICD-9-CM   1. Hypoglycemia E16.2 251.2                                    CHRISTI Butler  08/23/19 [id number]

## 2019-08-23 NOTE — ED TRIAGE NOTES
"Pt presents to ED after "not feeling right". Pt states he felt diaphoretic, and lightheaded. EMS states CBG was 56 on arrival. 1 amp D50 given and . CBG 92 on arrival. Pt states he is feeling much better now.   "

## 2019-08-23 NOTE — ED NOTES
Pt provided with Regular dinner tray- pt refusing to eat it. Pt provided with turkey sandwich and juice. Pt does not want to eat crackers either. Pt has no other needs or complaints at this time

## 2019-08-26 ENCOUNTER — OFFICE VISIT (OUTPATIENT)
Dept: CARDIOLOGY | Facility: CLINIC | Age: 64
End: 2019-08-26
Payer: OTHER MISCELLANEOUS

## 2019-08-26 VITALS
DIASTOLIC BLOOD PRESSURE: 57 MMHG | SYSTOLIC BLOOD PRESSURE: 109 MMHG | HEART RATE: 89 BPM | WEIGHT: 267.5 LBS | BODY MASS INDEX: 41.99 KG/M2 | HEIGHT: 67 IN

## 2019-08-26 DIAGNOSIS — K86.0 ALCOHOL-INDUCED CHRONIC PANCREATITIS: ICD-10-CM

## 2019-08-26 DIAGNOSIS — I48.0 PAROXYSMAL ATRIAL FIBRILLATION: ICD-10-CM

## 2019-08-26 DIAGNOSIS — E11.69 HYPERLIPIDEMIA ASSOCIATED WITH TYPE 2 DIABETES MELLITUS: ICD-10-CM

## 2019-08-26 DIAGNOSIS — I15.2 HYPERTENSION ASSOCIATED WITH DIABETES: Primary | ICD-10-CM

## 2019-08-26 DIAGNOSIS — I70.90 ATHEROSCLEROSIS: ICD-10-CM

## 2019-08-26 DIAGNOSIS — I25.10 CORONARY ARTERY DISEASE DUE TO CALCIFIED CORONARY LESION: ICD-10-CM

## 2019-08-26 DIAGNOSIS — I25.84 CORONARY ARTERY DISEASE DUE TO CALCIFIED CORONARY LESION: ICD-10-CM

## 2019-08-26 DIAGNOSIS — E11.59 HYPERTENSION ASSOCIATED WITH DIABETES: Primary | ICD-10-CM

## 2019-08-26 DIAGNOSIS — I87.2 VENOUS STASIS DERMATITIS OF BOTH LOWER EXTREMITIES: ICD-10-CM

## 2019-08-26 DIAGNOSIS — E78.5 HYPERLIPIDEMIA ASSOCIATED WITH TYPE 2 DIABETES MELLITUS: ICD-10-CM

## 2019-08-26 PROCEDURE — 99999 PR PBB SHADOW E&M-EST. PATIENT-LVL III: ICD-10-PCS | Mod: PBBFAC,,, | Performed by: INTERNAL MEDICINE

## 2019-08-26 PROCEDURE — 99214 PR OFFICE/OUTPT VISIT, EST, LEVL IV, 30-39 MIN: ICD-10-PCS | Mod: S$GLB,,, | Performed by: INTERNAL MEDICINE

## 2019-08-26 PROCEDURE — 99999 PR PBB SHADOW E&M-EST. PATIENT-LVL III: CPT | Mod: PBBFAC,,, | Performed by: INTERNAL MEDICINE

## 2019-08-26 PROCEDURE — 99214 OFFICE O/P EST MOD 30 MIN: CPT | Mod: S$GLB,,, | Performed by: INTERNAL MEDICINE

## 2019-08-26 RX ORDER — METOPROLOL SUCCINATE 25 MG/1
25 TABLET, EXTENDED RELEASE ORAL DAILY
Qty: 30 TABLET | Refills: 11
Start: 2019-08-26 | End: 2020-03-24 | Stop reason: SDUPTHER

## 2019-08-26 NOTE — PROGRESS NOTES
"  Physical Therapy Daily Treatment Note     Name: Jian Arrieta  Clinic Number: [id number]    Therapy Diagnosis:   Encounter Diagnoses   Name Primary?    Decreased strength     Impaired functional mobility, balance, gait, and endurance      Physician: Liliane Churchill MD    Visit Date: 8/27/2019    Physician Orders: PT Eval and Treat   Medical Diagnosis from Referral: Decreased mobility  Evaluation Date: 8/9/2019  Authorization Period Expiration: 12/31/19  Plan of Care Expiration: 10/4/19  Visit # / Visits authorized: 3/ 50     Time In:  9:00 am  Time Out: 10:00 am  Total Billable Time: 30 minutes (TE-2)     Precautions: Standard, Diabetes, Fall and Cardiac, BLE lymphedema    Subjective     Pt reports: doing much better today compared to last week, stating he "bottomed out" last week because of his diabetes and was in the hospital . Pt stated he saw his cardiologist yesterday.  Pt reports that he feels like hes getting better every day.    He was not compliant with home exercise program.    Response to previous treatment: no adverse effects   Functional change:  Able to walk longer distances     Pain: 0/10   Location: N/A    Objective     Jian received therapeutic exercises to develop strength, endurance, ROM and core stabilization for 60 minutes including:    Supine hip adduction isometric with ball : 2 x 10 reps , 5'' hold  Supine clamshells c/ orange t-band : 2 x 10  Supine bridges : 2 x 10  LTR: x 10 B   Seated HSS: 4 x 15" B  Prone laying hip flexor stretch : 2'  Seated marching 2 x 10 reps B, 3s holds  Seated knee extension 2#, 2 x 10 reps B, 3s holds  Seated heel raises : 2 x 10     At [employer] with B UE support:     Step ups 6' : x 15 B  Marching : x 15 B   Hip flexor stretch at stairs : 3 x 30'' B    Home Exercises Provided and Patient Education Provided     Education provided:   - Importance of HEP compliance   - Cues with exercises    Written Home Exercises Provided: Continue prior HEP  Exercises " were reviewed and Jian was able to demonstrate them prior to the end of the session.  Jian demonstrated good  understanding of the education provided.     See EMR under Patient Instructions for exercises provided 8/20/2019.    Assessment     Pt demonstrated a good tolerance to session today and was able to complete with no adverse effects. Pt tolerated progressions well indicated in bold above. Pt is eager to improve his hip flexor strength so he can lift his leg up high enough to get into his truck . Pt exhibits good passive hip flexion although poor active flexion ROM due to weakness. Pt was encouraged to remain compliant with HEP to improve his hip strength . Will continue to progress with standing and balance activities as tolerated.     Jian is progressing well towards his goals.   Pt prognosis is Good.     Pt will continue to benefit from skilled outpatient physical therapy to address the deficits listed in the problem list box on initial evaluation, provide pt/family education and to maximize pt's level of independence in the home and community environment.   Pt's spiritual, cultural and educational needs considered and pt agreeable to plan of care and goals.    Anticipated barriers to physical therapy: co-morbidities    Goals:  Short Term Goals: 4 weeks   1. Pt will tolerate HEP for improved strength, functional mobility, ROM, posture, and endurance. (progressing, not met)  2. Pt will demo >/= 4/5 strength in BLE's for improved functional mobility, endurance, and posture. (progressing, not met)  3. Pt will reduce TUG (timed up and go) time to </= 17 sec for improved safety with community ambulation and decreased falls risk. (progressing, not met)  4. Pt will perform 9 sit <> stands with use of armrests in 30s for improved endurance. (progressing, not met)  5. Pt will maintain balance in MCTSIB (Modified Clinical Test of Sensory Interaction on Balance) scenario 3 for >/=30s for improved balance/decreased  falls risk. (progressing, not met)  6. Pt will report feeling more steady/confident on his feet when walking at home and in the community for improved functional mobility. (progressing, not met)     Long Term Goals: 8 weeks   1. Pt will be I with updated HEP for improved functional mobility, posture, strength, and endurance. (progressing, not met)  2. Pt will demo 5/5 strength in BLE's for improved functional mobility, endurance, and posture. (progressing, not met)  3. Pt will reduce TUG (timed up and go) time to </= 11 sec for improved safety with community ambulation and decreased falls risk. (progressing, not met)  4. Pt will perform 11 sit <> stands with use of armrests in 30s for improved endurance. (progressing, not met)  5. Pt will maintain balance in MCTSIB (Modified Clinical Test of Sensory Interaction on Balance) scenario 4 for >/=30s for improved balance/decreased falls risk. (progressing, not met)  6. Pt will report/demo negotiating 1 flight of stairs with Mod I for improved functional mobility at home and in the community. (progressing, not met)    Plan     Continue POC. Progress as tolerated.    Raeann Rodriguez, PTA

## 2019-08-27 ENCOUNTER — CLINICAL SUPPORT (OUTPATIENT)
Dept: REHABILITATION | Facility: HOSPITAL | Age: 64
End: 2019-08-27
Payer: COMMERCIAL

## 2019-08-27 DIAGNOSIS — Z74.09 IMPAIRED FUNCTIONAL MOBILITY, BALANCE, GAIT, AND ENDURANCE: ICD-10-CM

## 2019-08-27 DIAGNOSIS — R53.1 DECREASED STRENGTH: ICD-10-CM

## 2019-08-27 PROCEDURE — 97110 THERAPEUTIC EXERCISES: CPT | Mod: PO

## 2019-08-28 NOTE — PROGRESS NOTES
"  Physical Therapy Daily Treatment Note     Name: Jian Arrieta  Clinic Number: [id number]    Therapy Diagnosis:   Encounter Diagnoses   Name Primary?    Decreased strength     Impaired functional mobility, balance, gait, and endurance      Physician: Liliane Churchill MD    Visit Date: 8/29/2019    Physician Orders: PT Eval and Treat   Medical Diagnosis from Referral: Decreased mobility  Evaluation Date: 8/9/2019  Authorization Period Expiration: 12/31/19  Plan of Care Expiration: 10/4/19  Visit # / Visits authorized: 4/ 50     Time In:  9:00 am   Time Out: 10:00 am  Total Billable Time: 40 minutes (TE-3)     Precautions: Standard, Diabetes, Fall and Cardiac, BLE lymphedema    Subjective     Pt reports: his goal is to do 4-5 rounds on the stairs today . Pt stated he had some lower back pain after his last session , thinks it is from the bridges.     He was not compliant with home exercise program.    Response to previous treatment: LBP from performing bridges  Functional change:  Able to walk longer distances     Pain: 0/10   Location: N/A    Objective     Jian received therapeutic exercises to develop strength, endurance, ROM and core stabilization for 60 minutes including:    Recumbent bike: 5'  Supine hip adduction isometric with ball : 2 x 10 reps , 5'' hold  Supine clamshells c/ green t-band : 2 x 10  Supine bridges : 2 x 10 - NP  LTR c/ orange t-band: x 15 B   Seated HSS: 4 x 15" B  Prone laying hip flexor stretch : 2' - NP  Seated marching 2 x 10 reps B, 3s holds NP-performed standing   Seated knee extension 3#, 2 x 10 reps B, 3s holds  Seated heel raises : 2 x 10   Hip flexor stretch at stairs : 3 x 30'' B  Stairs 4 ascending and descending : x 5 rounds reciprocally c/ B UE  Leg press , 70#: 2 x 10    At [employer] with B UE support:     Step ups 6' : 2 x 10 B  Marching : 2 x 10 B       Home Exercises Provided and Patient Education Provided     Education provided:   - Importance of HEP compliance   - " Cues with exercises    Written Home Exercises Provided: Continue prior HEP  Exercises were reviewed and Jian was able to demonstrate them prior to the end of the session.  Jian demonstrated good  understanding of the education provided.     See EMR under Patient Instructions for exercises provided 8/20/2019.    Assessment     Pt is progressing well and was able to complete session with no adverse effects. Pt with a good tolerance to progressions made indicated in bold above. Pt still relies heavily on BUE with stairs and step ups. Pt with an appropriate amount of exercise induced muscular fatigue achieved upon completion.     Jian is progressing well towards his goals.   Pt prognosis is Good.     Pt will continue to benefit from skilled outpatient physical therapy to address the deficits listed in the problem list box on initial evaluation, provide pt/family education and to maximize pt's level of independence in the home and community environment.   Pt's spiritual, cultural and educational needs considered and pt agreeable to plan of care and goals.    Anticipated barriers to physical therapy: co-morbidities    Goals:  Short Term Goals: 4 weeks   1. Pt will tolerate HEP for improved strength, functional mobility, ROM, posture, and endurance. (progressing, not met)  2. Pt will demo >/= 4/5 strength in BLE's for improved functional mobility, endurance, and posture. (progressing, not met)  3. Pt will reduce TUG (timed up and go) time to </= 17 sec for improved safety with community ambulation and decreased falls risk. (progressing, not met)  4. Pt will perform 9 sit <> stands with use of armrests in 30s for improved endurance. (progressing, not met)  5. Pt will maintain balance in MCTSIB (Modified Clinical Test of Sensory Interaction on Balance) scenario 3 for >/=30s for improved balance/decreased falls risk. (progressing, not met)  6. Pt will report feeling more steady/confident on his feet when walking at home and  in the community for improved functional mobility. (progressing, not met)     Long Term Goals: 8 weeks   1. Pt will be I with updated HEP for improved functional mobility, posture, strength, and endurance. (progressing, not met)  2. Pt will demo 5/5 strength in BLE's for improved functional mobility, endurance, and posture. (progressing, not met)  3. Pt will reduce TUG (timed up and go) time to </= 11 sec for improved safety with community ambulation and decreased falls risk. (progressing, not met)  4. Pt will perform 11 sit <> stands with use of armrests in 30s for improved endurance. (progressing, not met)  5. Pt will maintain balance in MCTSIB (Modified Clinical Test of Sensory Interaction on Balance) scenario 4 for >/=30s for improved balance/decreased falls risk. (progressing, not met)  6. Pt will report/demo negotiating 1 flight of stairs with Mod I for improved functional mobility at home and in the community. (progressing, not met)    Plan     Continue POC. Progress as tolerated.    Raeann Rodriguez, PTA

## 2019-08-29 ENCOUNTER — CLINICAL SUPPORT (OUTPATIENT)
Dept: REHABILITATION | Facility: HOSPITAL | Age: 64
End: 2019-08-29
Payer: COMMERCIAL

## 2019-08-29 ENCOUNTER — HOSPITAL ENCOUNTER (OUTPATIENT)
Dept: WOUND CARE | Facility: HOSPITAL | Age: 64
Discharge: HOME | End: 2019-08-29
Attending: EMERGENCY MEDICINE
Payer: COMMERCIAL

## 2019-08-29 VITALS — TEMPERATURE: 99 F | SYSTOLIC BLOOD PRESSURE: 99 MMHG | DIASTOLIC BLOOD PRESSURE: 54 MMHG | HEART RATE: 103 BPM

## 2019-08-29 DIAGNOSIS — L97.929 VENOUS STASIS ULCER OF LEFT LOWER LEG WITH EDEMA OF LEFT LOWER LEG: ICD-10-CM

## 2019-08-29 DIAGNOSIS — L03.115 CELLULITIS OF RIGHT LEG: ICD-10-CM

## 2019-08-29 DIAGNOSIS — E66.01 OBESITY, CLASS III, BMI 40-49.9 (MORBID OBESITY): ICD-10-CM

## 2019-08-29 DIAGNOSIS — Z74.09 IMPAIRED FUNCTIONAL MOBILITY, BALANCE, GAIT, AND ENDURANCE: ICD-10-CM

## 2019-08-29 DIAGNOSIS — R53.1 DECREASED STRENGTH: ICD-10-CM

## 2019-08-29 DIAGNOSIS — R60.0 VENOUS STASIS ULCER OF LEFT LOWER LEG WITH EDEMA OF LEFT LOWER LEG: ICD-10-CM

## 2019-08-29 DIAGNOSIS — E11.42 DIABETIC POLYNEUROPATHY ASSOCIATED WITH TYPE 2 DIABETES MELLITUS: ICD-10-CM

## 2019-08-29 DIAGNOSIS — I83.892 VENOUS STASIS ULCER OF LEFT LOWER LEG WITH EDEMA OF LEFT LOWER LEG: ICD-10-CM

## 2019-08-29 DIAGNOSIS — I89.0 LYMPHEDEMA OF BOTH LOWER EXTREMITIES: Primary | ICD-10-CM

## 2019-08-29 DIAGNOSIS — L02.416 CELLULITIS AND ABSCESS OF LEFT LEG: ICD-10-CM

## 2019-08-29 DIAGNOSIS — L03.116 CELLULITIS OF LEFT LEG: ICD-10-CM

## 2019-08-29 DIAGNOSIS — N18.4 CKD (CHRONIC KIDNEY DISEASE), STAGE IV: ICD-10-CM

## 2019-08-29 DIAGNOSIS — L03.116 CELLULITIS AND ABSCESS OF LEFT LEG: ICD-10-CM

## 2019-08-29 DIAGNOSIS — I87.2 VENOUS STASIS DERMATITIS OF BOTH LOWER EXTREMITIES: ICD-10-CM

## 2019-08-29 DIAGNOSIS — I83.029 VENOUS STASIS ULCER OF LEFT LOWER LEG WITH EDEMA OF LEFT LOWER LEG: ICD-10-CM

## 2019-08-29 DIAGNOSIS — R23.9 ALTERATION IN SKIN INTEGRITY: ICD-10-CM

## 2019-08-29 PROCEDURE — 97110 THERAPEUTIC EXERCISES: CPT | Mod: PO

## 2019-08-29 PROCEDURE — 29581 APPL MULTLAYER CMPRN SYS LEG: CPT

## 2019-08-29 NOTE — PROGRESS NOTES
"[facility]                Progress Note    Subjective:       Patient ID: Jian Arrieta is a 64 y.o. male.    Chief Complaint: Venous Ulcer    Chief Complaint: Non-healing Wound     8/8/2019: Pt presents to wound care clinic referred from [facility]. History of T2DM with neuropathy, PAF, HTN, HLD, CKD IV, Atherosclerosis, Anemia, Morbid Obesity, Chronic LE lymphedema, BERHANE, Ascites/portal HTN, Chronic pancreatitis. Per records, "Pt presented to [facility] 6/27/19 as a transfer from [facility] with large volume ascites 2/2 portal vein occlusion requiring IR procedure for recannalization." Patient initially presented to [facility] for worsening RLE pain and bilateral lower extremity lymphedema contributing to progressive immobility combined with wife and sister no longer being able to care for patient. Patient received vancomycin for concern for RLE cellulitis and also had US of BLE which were negative for DVT. New admit for Dr. Jeansonne. Pedal pulses and bilateral lower extremities edema +3 present. Bilateral lower extremities 4 layer compression wrap applied as per Dr. Jeansonne orders'. Education on wound care dressing provided to MR. Arrieta, voices understanding.  8/13/19:  Nurse visit for dressing change.  Profore 4 layer multi compression wrap to BLEs from toe to knee.  Patient tolerated well.    8/22/19: Patient seen today in clinic by Dr. Cotton covering for Dr. Jeansonne, for cellulitis of BLE and abscess of LLE.   Wounds improving, no changes to wound care. Four layer compression wrap applied toe to knee BLE , patient tolerated well.  Patient reports to Dr. Cotton not using his lymphedema pumps due to them being to hard to get on and off and not having help with them.  Dr. Cotton encouraged patient to use lymphemedma pumps and elevate legs as much as possible patient verbalized understanding.  8/29/19:  Fu in " "clinic today with Dr. Buckner.  Profore 4 layer compression wrap applied to BLEs.  Patient admits he is not using his lymphedema pumps.  States, "I'm not going to use them."  Care tolerated well.  Plan for discharge next visit.  Fu in clinic in 1 week.    Hx as above; no other c/o noted.    Review of Systems      Objective:   Wounds as noted w/o s/s of infection or Yamileth's sign.     Physical Exam    Vitals:    08/29/19 1433   BP: (!) 99/54   Pulse: 103   Temp: 98.7 °F (37.1 °C)       Assessment:           ICD-10-CM ICD-9-CM   1. Lymphedema of both lower extremities I89.0 457.1   2. Cellulitis of right leg L03.115 682.6   3. Cellulitis and abscess of left leg L03.116 682.6    L02.416    4. Cellulitis of left leg L03.116 682.6   5. Obesity, Class III, BMI 40-49.9 (morbid obesity) E66.01 278.01   6. Diabetic polyneuropathy associated with type 2 diabetes mellitus E11.42 250.60     357.2   7. Alteration in skin integrity R23.9 782.9   8. Venous stasis dermatitis of both lower extremities I87.2 454.1   9. Venous stasis ulcer of left lower leg with edema of left lower leg I83.029 454.8    I83.892 454.0    L97.929     R60.9    10. CKD (chronic kidney disease), stage IV N18.4 585.4   11. Impaired functional mobility, balance, gait, and endurance Z74.09 V49.89            Wound 08/08/19 1700 Blister(s) upper Leg #1 (Active)   08/08/19 1700    Pre-existing: Yes   Primary Wound Type: Blister(s)   Side: Left   Orientation: upper   Location: Leg   Wound/PI Number (optional): #1   Ankle-Brachial Index:    Pulses: present per doppler   Removal Indication and Assessment:    Wound Outcome:    (Retired) Wound Type:    (Retired) Wound Length (cm):    (Retired) Wound Width (cm):    (Retired) Depth (cm):    Wound Description (Comments):    Removal Indications:    Dressing Appearance Open to air 8/29/2019  2:00 PM   Drainage Amount None 8/29/2019  2:00 PM   Appearance Eschar 8/29/2019  2:00 PM   Tissue loss description Partial thickness " 8/29/2019  2:00 PM   Black (%), Wound Tissue Color 100 % 8/29/2019  2:00 PM   Periwound Area Intact;Satellite lesion;Edematous 8/29/2019  2:00 PM   Wound Edges Irregular 8/29/2019  2:00 PM   Wound Length (cm) 0.2 cm 8/29/2019  2:00 PM   Wound Width (cm) 0.2 cm 8/29/2019  2:00 PM   Wound Surface Area (cm^2) 0.04 cm^2 8/29/2019  2:00 PM   Care Cleansed with:;Soap and water 8/29/2019  2:00 PM   Dressing Applied;Compression wrap 8/29/2019  2:00 PM   Periwound Care Moisturizer applied 8/29/2019  2:00 PM   Compression Four layer compression 8/29/2019  2:00 PM   Dressing Change Due 09/05/19 8/29/2019  2:00 PM            Wound 08/08/19 1714 Blister(s) anterior Leg #2 (Active)   08/08/19 1714    Pre-existing: Yes   Primary Wound Type: Blister(s)   Side: Left   Orientation: anterior   Location: Leg   Wound/PI Number (optional): #2   Ankle-Brachial Index:    Pulses: pedal pulses present per doppler   Removal Indication and Assessment:    Wound Outcome:    (Retired) Wound Type:    (Retired) Wound Length (cm):    (Retired) Wound Width (cm):    (Retired) Depth (cm):    Wound Description (Comments):    Removal Indications:    Dressing Appearance Open to air 8/29/2019  2:00 PM   Drainage Amount None 8/29/2019  2:00 PM   Appearance Eschar 8/29/2019  2:00 PM   Tissue loss description Partial thickness 8/29/2019  2:00 PM   Black (%), Wound Tissue Color 100 % 8/29/2019  2:00 PM   Periwound Area Intact;Edematous 8/29/2019  2:00 PM   Wound Length (cm) 0.3 cm 8/29/2019  2:00 PM   Wound Width (cm) 0.2 cm 8/29/2019  2:00 PM   Wound Surface Area (cm^2) 0.06 cm^2 8/29/2019  2:00 PM   Care Cleansed with:;Sterile normal saline 8/29/2019  2:00 PM   Dressing Changed;Applied;Compression wrap 8/29/2019  2:00 PM   Periwound Care Moisturizer applied 8/29/2019  2:00 PM   Compression Four layer compression 8/29/2019  2:00 PM   Dressing Change Due 09/05/19 8/29/2019  2:00 PM            Wound 08/08/19 1700 Other (comment) anterior Leg #3 (Active)    08/08/19 1700    Pre-existing: Yes   Primary Wound Type: Other   Side: Right   Orientation: anterior   Location: Leg   Wound/PI Number (optional): #3   Ankle-Brachial Index:    Pulses:    Removal Indication and Assessment:    Wound Outcome:    (Retired) Wound Type:    (Retired) Wound Length (cm):    (Retired) Wound Width (cm):    (Retired) Depth (cm):    Wound Description (Comments):    Removal Indications:    Dressing Appearance Open to air 8/29/2019  2:00 PM   Periwound Area Intact;Edematous 8/29/2019  2:00 PM   Care Cleansed with:;Soap and water 8/29/2019  2:00 PM   Dressing Changed;Applied;Compression wrap 8/29/2019  2:00 PM   Periwound Care Moisturizer applied 8/29/2019  2:00 PM   Compression Four layer compression 8/29/2019  2:00 PM   Dressing Change Due 09/05/19 8/29/2019  2:00 PM       Cleanse Bilateral lower extremities with normal saline.  Periwound care: sween cream bilateral dry lower extremities  Primary dressing: four layer compression wrap bilateral lower legs from toe to knee. Left leg open wounds apply aquacel extra before compression wraps.  Edema control: Profore from toe to knee, Elevate lower extremities  Frequency: once a week             Plan:          Follow-up: in clinic on Thur 9/5/19

## 2019-09-03 ENCOUNTER — DOCUMENTATION ONLY (OUTPATIENT)
Dept: REHABILITATION | Facility: HOSPITAL | Age: 64
End: 2019-09-03

## 2019-09-03 ENCOUNTER — CLINICAL SUPPORT (OUTPATIENT)
Dept: REHABILITATION | Facility: HOSPITAL | Age: 64
End: 2019-09-03
Payer: MEDICARE

## 2019-09-03 DIAGNOSIS — Z74.09 IMPAIRED FUNCTIONAL MOBILITY, BALANCE, GAIT, AND ENDURANCE: ICD-10-CM

## 2019-09-03 DIAGNOSIS — R53.1 DECREASED STRENGTH: ICD-10-CM

## 2019-09-03 PROCEDURE — 97110 THERAPEUTIC EXERCISES: CPT | Mod: PO

## 2019-09-03 NOTE — PROGRESS NOTES
.PT/PTA met face to face to discuss pt's treatment plan and progress towards established goals. Pt will be seen by a physical therapist minimally every 6th visit or every 30 days.      Raeann Rodriguez PTA

## 2019-09-03 NOTE — PROGRESS NOTES
"  Physical Therapy Daily Treatment Note     Name: Jian Arrieta  Clinic Number: [id number]    Therapy Diagnosis:   Encounter Diagnoses   Name Primary?    Decreased strength     Impaired functional mobility, balance, gait, and endurance      Physician: Liliane Churchill MD    Visit Date: 9/3/2019    Physician Orders: PT Eval and Treat   Medical Diagnosis from Referral: Decreased mobility  Evaluation Date: 8/9/2019  Authorization Period Expiration: 12/31/19  Plan of Care Expiration: 10/4/19  Visit # / Visits authorized: 5/ 50     Time In:  9:00 am   Time Out: 10:00 am  Total Billable Time: 40 minutes (TE-3)     Precautions: Standard, Diabetes, Fall and Cardiac, BLE lymphedema    Subjective     Pt reports: he has some more medical issues going on. Pt stated he has a rash on his right arm and what feels like an absess tooth . Pt stated he plans to make appointments for these. His main goal is to get up his stairs in his garage and get into his pickup truck.     He was somewhat compliant with home exercise program, stating "im trying to do them at home when I can" .     Response to previous treatment: slight fatigue reported  Functional change:  Able to walk longer distances     Pain: 0/10   Location: N/A    Objective     Jian received therapeutic exercises to develop strength, endurance, ROM and core stabilization for 60 minutes including:    Recumbent bike: 5'  Supine clamshells c/ green t-band : 2 x 10   LTR c/ green t-band: x 15 B   Seated HSS: 4 x 15" B  Seated knee extension 4#, 2 x 10 reps B, 3s holds  Seated heel raises : 3 x 10   Hip flexor stretch at stairs : 3 x 30'' B  Stairs ascending and descending :4 steps  x 5 rounds reciprocally c/ B UE  Leg press , 70#: 2 x 10  Sit<>Stands from hi-low table holding yellow weighted ball: 2 x 10    At [employer] with B UE support:     Step ups 6' : 2 x 10 B  Marching : 2 x 10 B   Mini squats 2 x 10  Side steps at counter c/ green t-band: x 3 laps       Not " "performed:  Supine hip adduction isometric with ball : 2 x 10 reps , 5'' hold   Supine bridges : 2 x 10   Prone laying hip flexor stretch : 2' - NP  Seated marching 2 x 10 reps B, 3s holds  NP-performed standing       Home Exercises Provided and Patient Education Provided     Education provided:   - Importance of HEP compliance   - Cues with exercises    Written Home Exercises Provided: Continue prior HEP  Exercises were reviewed and Jian was able to demonstrate them prior to the end of the session.  Jian demonstrated good  understanding of the education provided.     See EMR under Patient Instructions for exercises provided 8/20/2019.    Assessment     Pt demonstrated a good tolerance to session today and was able to complete with no adverse effects. Pt exhibits slight decreased strength in left leg compared to right, required more UE use while performing stairs and step ups leading with his left leg . Pt required cues to avoid compensations with step ups and stair climbing. Pt with two seated rest breaks required throughout session due to fatigue and reported "no pain but I feel the strain in my legs" towards completion.      Jian is progressing well towards his goals.   Pt prognosis is Good.     Pt will continue to benefit from skilled outpatient physical therapy to address the deficits listed in the problem list box on initial evaluation, provide pt/family education and to maximize pt's level of independence in the home and community environment.   Pt's spiritual, cultural and educational needs considered and pt agreeable to plan of care and goals.    Anticipated barriers to physical therapy: co-morbidities    Goals:  Short Term Goals: 4 weeks   1. Pt will tolerate HEP for improved strength, functional mobility, ROM, posture, and endurance. (progressing, not met)  2. Pt will demo >/= 4/5 strength in BLE's for improved functional mobility, endurance, and posture. (progressing, not met)  3. Pt will reduce TUG " (timed up and go) time to </= 17 sec for improved safety with community ambulation and decreased falls risk. (progressing, not met)  4. Pt will perform 9 sit <> stands with use of armrests in 30s for improved endurance. (progressing, not met)  5. Pt will maintain balance in MCTSIB (Modified Clinical Test of Sensory Interaction on Balance) scenario 3 for >/=30s for improved balance/decreased falls risk. (progressing, not met)  6. Pt will report feeling more steady/confident on his feet when walking at home and in the community for improved functional mobility. (progressing, not met)     Long Term Goals: 8 weeks   1. Pt will be I with updated HEP for improved functional mobility, posture, strength, and endurance. (progressing, not met)  2. Pt will demo 5/5 strength in BLE's for improved functional mobility, endurance, and posture. (progressing, not met)  3. Pt will reduce TUG (timed up and go) time to </= 11 sec for improved safety with community ambulation and decreased falls risk. (progressing, not met)  4. Pt will perform 11 sit <> stands with use of armrests in 30s for improved endurance. (progressing, not met)  5. Pt will maintain balance in MCTSIB (Modified Clinical Test of Sensory Interaction on Balance) scenario 4 for >/=30s for improved balance/decreased falls risk. (progressing, not met)  6. Pt will report/demo negotiating 1 flight of stairs with Mod I for improved functional mobility at home and in the community. (progressing, not met)    Plan     Continue POC. Progress as tolerated.    Raeann Rodriguez, PTA

## 2019-09-05 ENCOUNTER — HOSPITAL ENCOUNTER (OUTPATIENT)
Dept: WOUND CARE | Facility: HOSPITAL | Age: 64
Discharge: HOME | End: 2019-09-05
Attending: FAMILY MEDICINE
Payer: MEDICARE

## 2019-09-05 ENCOUNTER — DOCUMENTATION ONLY (OUTPATIENT)
Dept: REHABILITATION | Facility: HOSPITAL | Age: 64
End: 2019-09-05

## 2019-09-05 ENCOUNTER — CLINICAL SUPPORT (OUTPATIENT)
Dept: REHABILITATION | Facility: HOSPITAL | Age: 64
End: 2019-09-05
Payer: MEDICARE

## 2019-09-05 VITALS
DIASTOLIC BLOOD PRESSURE: 88 MMHG | BODY MASS INDEX: 41.91 KG/M2 | SYSTOLIC BLOOD PRESSURE: 106 MMHG | TEMPERATURE: 98 F | WEIGHT: 267 LBS | HEART RATE: 85 BPM | HEIGHT: 67 IN

## 2019-09-05 DIAGNOSIS — R53.1 DECREASED STRENGTH: ICD-10-CM

## 2019-09-05 DIAGNOSIS — E11.42 DM TYPE 2 WITH DIABETIC PERIPHERAL NEUROPATHY: ICD-10-CM

## 2019-09-05 DIAGNOSIS — E11.42 DIABETIC POLYNEUROPATHY ASSOCIATED WITH TYPE 2 DIABETES MELLITUS: ICD-10-CM

## 2019-09-05 DIAGNOSIS — I87.2 VENOUS STASIS DERMATITIS OF BOTH LOWER EXTREMITIES: ICD-10-CM

## 2019-09-05 DIAGNOSIS — R26.89 DECREASED MOBILITY: ICD-10-CM

## 2019-09-05 DIAGNOSIS — E66.01 OBESITY, CLASS III, BMI 40-49.9 (MORBID OBESITY): ICD-10-CM

## 2019-09-05 DIAGNOSIS — Z74.09 IMPAIRED FUNCTIONAL MOBILITY, BALANCE, GAIT, AND ENDURANCE: ICD-10-CM

## 2019-09-05 DIAGNOSIS — E44.0 MALNUTRITION OF MODERATE DEGREE: ICD-10-CM

## 2019-09-05 DIAGNOSIS — I89.0 LYMPHEDEMA OF BOTH LOWER EXTREMITIES: Primary | ICD-10-CM

## 2019-09-05 PROCEDURE — 99211 OFF/OP EST MAY X REQ PHY/QHP: CPT

## 2019-09-05 PROCEDURE — 97110 THERAPEUTIC EXERCISES: CPT | Mod: PO

## 2019-09-05 NOTE — PROGRESS NOTES
"  Physical Therapy Daily Treatment Note     Name: Jian Arrieta  Clinic Number: [id number]    Therapy Diagnosis:   Encounter Diagnoses   Name Primary?    Decreased strength     Impaired functional mobility, balance, gait, and endurance      Physician: Liliane Churchill MD    Visit Date: 9/5/2019    Physician Orders: PT Eval and Treat   Medical Diagnosis from Referral: Decreased mobility  Evaluation Date: 8/9/2019  Authorization Period Expiration: 12/31/19  Plan of Care Expiration: 10/4/19  Visit # / Visits authorized: 6/ 50     Time In:  11:00 AM  Time Out: 12:00 PM   Total Billable Time:  60 minutes      Precautions: Standard, Diabetes, Fall and Cardiac, BLE lymphedema    Subjective     Pt reports: mild low back pain.     He was somewhat compliant with home exercise program.     Response to previous treatment: slight fatigue reported  Functional change:  Able to walk longer distances     Pain: 0/10 - pt did not rate pain   Location: N/A    Objective     Jian received therapeutic exercises to develop strength, endurance, ROM and core stabilization for 60 minutes including:    Recumbent bike: 5'  Supine clamshells c/ green t-band : 2 x 10   LTR c/ green t-band: x 15 B   Seated HSS: 4 x 15" B  Seated knee extension 4#, 2 x 10 reps B, 3s holds  Seated heel raises : 3 x 10   Hip flexor stretch at stairs : 3 x 30'' B  Stairs ascending and descending :4 steps  x 5 rounds reciprocally c/ B UE  Leg press , 70#: 2 x 10  Sit<>Stands from hi-low table holding yellow weighted ball: 2 x 10    At [employer] with B UE support:     Step ups 6' : x 10 B  Marching : 2 x 10 B   Mini squats 2 x 10 - patient requested to stop after ~6 steps d/t fatigue   Side steps at counter c/ green t-band: x 3 laps       Not performed:  Supine hip adduction isometric with ball : 2 x 10 reps , 5'' hold   Supine bridges : 2 x 10   Prone laying hip flexor stretch : 2' - NP  Seated marching 2 x 10 reps B, 3s holds  NP-performed standing       Home " Exercises Provided and Patient Education Provided     Education provided:   - Importance of HEP compliance   - Cues with exercises    Written Home Exercises Provided: Continue prior HEP  Exercises were reviewed and Jian was able to demonstrate them prior to the end of the session.  Jian demonstrated good  understanding of the education provided.     See EMR under Patient Instructions for exercises provided 8/20/2019.    Assessment     Pt tolerated treatment well today with no reports of increased pain throughout treatment. Patient requested to stop mini squats after ~6 reps d/t fatigue. Patient with increased UE support needed when stepping up with the LLE during stair negotiation. Will cont to progress per patient's tolerance.      Jian is progressing well towards his goals.   Pt prognosis is Good.     Pt will continue to benefit from skilled outpatient physical therapy to address the deficits listed in the problem list box on initial evaluation, provide pt/family education and to maximize pt's level of independence in the home and community environment.   Pt's spiritual, cultural and educational needs considered and pt agreeable to plan of care and goals.    Anticipated barriers to physical therapy: co-morbidities    Goals:  Short Term Goals: 4 weeks   1. Pt will tolerate HEP for improved strength, functional mobility, ROM, posture, and endurance. (progressing, not met)  2. Pt will demo >/= 4/5 strength in BLE's for improved functional mobility, endurance, and posture. (progressing, not met)  3. Pt will reduce TUG (timed up and go) time to </= 17 sec for improved safety with community ambulation and decreased falls risk. (progressing, not met)  4. Pt will perform 9 sit <> stands with use of armrests in 30s for improved endurance. (progressing, not met)  5. Pt will maintain balance in MCTSIB (Modified Clinical Test of Sensory Interaction on Balance) scenario 3 for >/=30s for improved balance/decreased falls risk.  (progressing, not met)  6. Pt will report feeling more steady/confident on his feet when walking at home and in the community for improved functional mobility. (progressing, not met)     Long Term Goals: 8 weeks   1. Pt will be I with updated HEP for improved functional mobility, posture, strength, and endurance. (progressing, not met)  2. Pt will demo 5/5 strength in BLE's for improved functional mobility, endurance, and posture. (progressing, not met)  3. Pt will reduce TUG (timed up and go) time to </= 11 sec for improved safety with community ambulation and decreased falls risk. (progressing, not met)  4. Pt will perform 11 sit <> stands with use of armrests in 30s for improved endurance. (progressing, not met)  5. Pt will maintain balance in MCTSIB (Modified Clinical Test of Sensory Interaction on Balance) scenario 4 for >/=30s for improved balance/decreased falls risk. (progressing, not met)  6. Pt will report/demo negotiating 1 flight of stairs with Mod I for improved functional mobility at home and in the community. (progressing, not met)    Plan     Continue POC. Progress as tolerated.    Oralia Be, PTA

## 2019-09-05 NOTE — PROGRESS NOTES
PT/PTA met face to face to discuss pt's treatment plan and progress towards established goals. Pt will be seen by a physical therapist minimally every 6th visit or every 30 days.      Oralia Be PTA

## 2019-09-05 NOTE — PROGRESS NOTES
"[facility]                Progress Note    Subjective:       Patient ID: Jian Arrieta is a 64 y.o. male.    Chief Complaint: Venous Stasis    Chief Complaint: Venous Ulcer     Chief Complaint: Non-healing Wound     8/8/2019: Pt presents to wound care clinic referred from [facility]. History of T2DM with neuropathy, PAF, HTN, HLD, CKD IV, Atherosclerosis, Anemia, Morbid Obesity, Chronic LE lymphedema, BERHANE, Ascites/portal HTN, Chronic pancreatitis. Per records, "Pt presented to [facility] 6/27/19 as a transfer from [facility] with large volume ascites 2/2 portal vein occlusion requiring IR procedure for recannalization." Patient initially presented to [facility] for worsening RLE pain and bilateral lower extremity lymphedema contributing to progressive immobility combined with wife and sister no longer being able to care for patient. Patient received vancomycin for concern for RLE cellulitis and also had US of BLE which were negative for DVT. New admit for Dr. Jeansonne. Pedal pulses and bilateral lower extremities edema +3 present. Bilateral lower extremities 4 layer compression wrap applied as per Dr. Jeansonne orders'. Education on wound care dressing provided to MR. Arrieta, voices understanding.  8/13/19:  Nurse visit for dressing change.  Profore 4 layer multi compression wrap to BLEs from toe to knee.  Patient tolerated well.    8/22/19: Patient seen today in clinic by Dr. Cotton covering for Dr. Jeansonne, for cellulitis of BLE and abscess of LLE.   Wounds improving, no changes to wound care. Four layer compression wrap applied toe to knee BLE , patient tolerated well.  Patient reports to Dr. Cotton not using his lymphedema pumps due to them being to hard to get on and off and not having help with them.  Dr. Cotton encouraged patient to use lymphemedma pumps and elevate legs as much as possible patient verbalized " "understanding.  8/29/19:  Fu in clinic today with Dr. Buckner.  Profore 4 layer compression wrap applied to BLEs.  Patient admits he is not using his lymphedema pumps.  States, "I'm not going to use them."  Care tolerated well.  Plan for discharge next visit.  Fu in clinic in 1 week.    9/5/19:  FU in clinic today with Dr. Cotton.  Wounds are resolved. Patient discharged from [facility].  See orders.        Review of Systems      Objective:        Physical Exam    Vitals:    09/05/19 1415   BP: 106/88   Pulse: 85   Temp: 97.8 °F (36.6 °C)       Assessment:           ICD-10-CM ICD-9-CM   1. Lymphedema of both lower extremities I89.0 457.1   2. Obesity, Class III, BMI 40-49.9 (morbid obesity) E66.01 278.01   3. Diabetic polyneuropathy associated with type 2 diabetes mellitus E11.42 250.60     357.2   4. DM type 2 with diabetic peripheral neuropathy E11.42 250.60     357.2   5. Venous stasis dermatitis of both lower extremities I87.2 454.1   6. Decreased mobility R26.89 781.99   7. Malnutrition of moderate degree E44.0 263.0       [REMOVED]      Wound 08/08/19 1714 Blister(s) anterior Leg #2 (Removed)   08/08/19 1714    Pre-existing: Yes   Primary Wound Type: Blister(s)   Side: Left   Orientation: anterior   Location: Leg   Wound/PI Number (optional): #2   Ankle-Brachial Index:    Pulses: pedal pulses present per doppler   Removal Indication and Assessment: closed/resurfaced   Wound Outcome: Healed   (Retired) Wound Type:    (Retired) Wound Length (cm):    (Retired) Wound Width (cm):    (Retired) Depth (cm):    Wound Description (Comments):    Removal Indications:    Removed 09/05/19 1454   Wound Image   9/5/2019  2:00 PM   Dressing Appearance Intact 9/5/2019  2:00 PM   Drainage Amount None 9/5/2019  2:00 PM   Appearance Epithelialization 9/5/2019  2:00 PM   Tissue loss description Not applicable 9/5/2019  2:00 PM   Periwound Area Edematous 9/5/2019  2:00 PM   Wound Edges Rolled/closed 9/5/2019  2:00 PM       [REMOVED]     "  Wound 08/08/19 1700 Other (comment) anterior Leg #3 (Removed)   08/08/19 1700    Pre-existing: Yes   Primary Wound Type: Other   Side: Right   Orientation: anterior   Location: Leg   Wound/PI Number (optional): #3   Ankle-Brachial Index:    Pulses:    Removal Indication and Assessment: closed/resurfaced   Wound Outcome: Healed   (Retired) Wound Type:    (Retired) Wound Length (cm):    (Retired) Wound Width (cm):    (Retired) Depth (cm):    Wound Description (Comments):    Removal Indications:    Removed 09/05/19 1455   Wound Image   9/5/2019  2:00 PM   Dressing Appearance Intact 9/5/2019  2:00 PM   Drainage Amount None 9/5/2019  2:00 PM   Appearance Epithelialization 9/5/2019  2:00 PM   Tissue loss description Not applicable 9/5/2019  2:00 PM   Periwound Area Edematous 9/5/2019  2:00 PM   Wound Edges Rolled/closed 9/5/2019  2:00 PM         Bilateral lower extremity wounds are healed.  Healed Wound Instructions:Your wound is healed, it is extremely fragile at this stage; protect it from friction, wash it gently and pat dry.  If the wound should re-open, please call [phone] for [facility] instructions.  Apply moisturizer to dry skin daily.  Wear compression stocking daily. May remove stockings at night.                Plan:              Discharged from [facility]

## 2019-09-10 ENCOUNTER — CLINICAL SUPPORT (OUTPATIENT)
Dept: REHABILITATION | Facility: HOSPITAL | Age: 64
End: 2019-09-10
Payer: MEDICARE

## 2019-09-10 DIAGNOSIS — R53.1 DECREASED STRENGTH: ICD-10-CM

## 2019-09-10 DIAGNOSIS — Z74.09 IMPAIRED FUNCTIONAL MOBILITY, BALANCE, GAIT, AND ENDURANCE: ICD-10-CM

## 2019-09-10 PROCEDURE — 97110 THERAPEUTIC EXERCISES: CPT | Mod: PO

## 2019-09-10 NOTE — PROGRESS NOTES
Physical Therapy Daily Treatment Note     Name: Jian Arrieta  Clinic Number: [id number]    Therapy Diagnosis:   Encounter Diagnoses   Name Primary?    Decreased strength     Impaired functional mobility, balance, gait, and endurance      Physician: Liliane Churchill MD    Visit Date: 9/10/2019    Physician Orders: PT Eval and Treat   Medical Diagnosis from Referral: Decreased mobility  Evaluation Date: 8/9/2019  Authorization Period Expiration: 12/31/19  Plan of Care Expiration: 10/4/19  Visit # / Visits authorized: 7/ 50     Time In:  11:05 AM  Time Out: 12:02 PM   Total Billable Time: 50 minutes      Precautions: Standard, Diabetes, Fall and Cardiac, BLE lymphedema    Subjective     Pt reports: no new complaints. Pt reports R abdominal pain- 6/10 at worst, 1/10 currently. Pt encouraged to follow-up with MD about pain if it continues to be an issue. It's hard to get in his truck    He was somewhat compliant with home exercise program.     Response to previous treatment: no adverse reaction  Functional change:  Able to climb stairs more easily, but he needs to do step-to pattern  Pain: 1/10    Location: R abdoment    Objective     Jian received therapeutic exercises to develop strength, endurance, ROM and core stabilization for 50 minutes including:    Lower Extremity Strength (Seated)  Right LE   Left LE     Knee extension: 5/5 Knee extension: 5/5   Knee flexion: 5/5 Knee flexion: 5/5   Hip flexion: 4+/5 Hip flexion: 4+/5   Hip extension:  >/=3/5 Hip extension: >/=3/5   Hip abduction: 5/5 Hip abduction: 5/5   Hip adduction: 5/5 Hip adduction 5/5   Ankle dorsiflexion: 5/5 Ankle dorsiflexion: 5/5   Ankle plantarflexion: 4+/5 ( in available range) Ankle plantarflexion: 5/5            Special Tests:  - Sit <> stands in 30s (from gold chair): 11 reps with arms  -TUG from gold chair, 12s, no device     MCTSIB (Modified Clinical Test of Sensory Interaction on Balance):  1. Eyes Open/feet together/Firm: 30 seconds  2.  "Eyes Closed/feet together/Firm: 30 seconds  3. Eyes Open/feet together/Foam: 30 seconds  4. Eyes Closed/feet together/Foam: 7 seconds       Seated ankle pumps, 20  Seated marching x 20 B  Heel raises, 20 reps at counter  Mini-squats, 2 x 10, UE support on counter  Hip Ext at counter, 2 x 10 reps  Side steps at counter c/ green t-band: x 10 laps   Standing marching, 20/LE, UE support on counter  Leg press , 70#: 2 x 10  Recumbent bike: 6', level 2    Not performed today:  Supine clamshells c/ green t-band : 2 x 10   LTR c/ green t-band: x 15 B   Seated HSS: 4 x 15" B  Seated knee extension 4#, 2 x 10 reps B, 3s holds  Hip flexor stretch at stairs : 3 x 30'' B  Stairs ascending and descending :4 steps  x 5 rounds reciprocally c/ B UE  Sit<>Stands from hi-low table holding yellow weighted ball: 2 x 10  Supine hip adduction isometric with ball : 2 x 10 reps , 5'' hold   Supine bridges : 2 x 10   Prone laying hip flexor stretch : 2' - NP  Seated marching 2 x 10 reps B, 3s holds  NP-performed standing       Home Exercises Provided and Patient Education Provided     Education provided:   - Importance of HEP compliance   - Cues with exercises    Written Home Exercises Provided: Continue prior HEP  Exercises were reviewed and Jian was able to demonstrate them prior to the end of the session.  Jian demonstrated good  understanding of the education provided.     See EMR under Patient Instructions for exercises provided 8/20/2019.    Assessment     Pt tolerated treatment well today with no reports of increased pain throughout treatment. Pt demo's increased LE strength, improved endurance and TUG time. Pt has met goals as noted below and has made good progress. Will cont to progress per patient's tolerance.      Jian is progressing well towards his goals.   Pt prognosis is Good.     Pt will continue to benefit from skilled outpatient physical therapy to address the deficits listed in the problem list box on initial evaluation, " provide pt/family education and to maximize pt's level of independence in the home and community environment.   Pt's spiritual, cultural and educational needs considered and pt agreeable to plan of care and goals.    Anticipated barriers to physical therapy: co-morbidities    Goals:  Short Term Goals: 4 weeks   1. Pt will tolerate HEP for improved strength, functional mobility, ROM, posture, and endurance. (progressing, not met)  2. Pt will demo >/= 4/5 strength in BLE's for improved functional mobility, endurance, and posture. (met, except for hip Ext)  3. Pt will reduce TUG (timed up and go) time to </= 17 sec for improved safety with community ambulation and decreased falls risk. (exceeded 9/10)  4. Pt will perform 9 sit <> stands with use of armrests in 30s for improved endurance. (exceeded, 9/10)  5. Pt will maintain balance in MCTSIB (Modified Clinical Test of Sensory Interaction on Balance) scenario 3 for >/=30s for improved balance/decreased falls risk. (met, 9/10)  6. Pt will report feeling more steady/confident on his feet when walking at home and in the community for improved functional mobility. (progressing, not met)     Long Term Goals: 8 weeks   1. Pt will be I with updated HEP for improved functional mobility, posture, strength, and endurance. (progressing, not met)  2. Pt will demo 5/5 strength in BLE's for improved functional mobility, endurance, and posture. (progressing, not met)  3. Pt will reduce TUG (timed up and go) time to </= 11 sec for improved safety with community ambulation and decreased falls risk. (progressing, not met)  4. Pt will perform 11 sit <> stands with use of armrests in 30s for improved endurance. (progressing, not met)  5. Pt will maintain balance in MCTSIB (Modified Clinical Test of Sensory Interaction on Balance) scenario 4 for >/=30s for improved balance/decreased falls risk. (progressing, not met)  6. Pt will report/demo negotiating 1 flight of stairs with Mod I for  improved functional mobility at home and in the community. (progressing, not met)    Plan     Continue POC. Progress as tolerated.    Dora Mendez, PT

## 2019-09-12 ENCOUNTER — CLINICAL SUPPORT (OUTPATIENT)
Dept: REHABILITATION | Facility: HOSPITAL | Age: 64
End: 2019-09-12
Payer: MEDICARE

## 2019-09-12 DIAGNOSIS — Z74.09 IMPAIRED FUNCTIONAL MOBILITY, BALANCE, GAIT, AND ENDURANCE: ICD-10-CM

## 2019-09-12 DIAGNOSIS — R53.1 DECREASED STRENGTH: ICD-10-CM

## 2019-09-12 PROCEDURE — 97110 THERAPEUTIC EXERCISES: CPT | Mod: PO

## 2019-09-12 NOTE — PROGRESS NOTES
"  Physical Therapy Daily Treatment Note     Name: Jian Arrieta  Clinic Number: [id number]    Therapy Diagnosis:   Encounter Diagnoses   Name Primary?    Decreased strength     Impaired functional mobility, balance, gait, and endurance      Physician: Liliane Churchill MD    Visit Date: 2019    Physician Orders: PT Eval and Treat   Medical Diagnosis from Referral: Decreased mobility  Evaluation Date: 2019  Authorization Period Expiration: 19  Plan of Care Expiration: 10/4/19  Visit # / Visits authorized:      Time In:  11:00 AM  Time Out: 12:00 PM   Total Billable Time: 25 minutes      Precautions: Standard, Diabetes, Fall and Cardiac, BLE lymphedema    Subjective     Pt reports: no new complaints. Pt reports that it's a bad day for him because his srinivas passed away. Pt continues to endorse intermittent L flank pain.  Pt reports his goals remain getting in his truck and up the stairs. Pt expresses interest in medical fitness referral in the future for use of the pool.    He was somewhat compliant with home exercise program.     Response to previous treatment: no adverse reaction  Functional change:  None reported    Pain: 1/10    Location: R abdomen/flank    Objective   Pt arrived without leg wraps today. Pt states he did not have time after bathing this morning    Jian received therapeutic exercises to develop strength, endurance, ROM and core stabilization for 54 minutes includin:1 for 25min    Seated kicks, 20 B  Seated marching x 20 B  Heel raises, 20 reps at counter  Standing hip ABD, 20/LE  Hip Ext at counter, 2 x 10 reps/LE  Hip flexion standing, 2 x 10  Hamstring curls, 2 x 10, LE support on Precor bar  Recumbent bike, 6 min, level 2  Precor Leg press , 70#: 2 x 10, 80#, 1 x 10  Step-ups at bottom step of therapy stairs, 2 x 10/LE      Not performed today:  Supine clamshells c/ green t-band : 2 x 10   LTR c/ green t-band: x 15 B   Seated HSS: 4 x 15" B  Hip flexor stretch at " stairs : 3 x 30'' B  Stairs ascending and descending :4 steps  x 5 rounds reciprocally c/ B UE  Sit<>Stands from hi-low table holding yellow weighted ball: 2 x 10  Supine hip adduction isometric with ball : 2 x 10 reps , 5'' hold   Supine bridges : 2 x 10   Prone laying hip flexor stretch : 2' - NP  Mini-squats, 2 x 10, UE support on counter  Side steps at counter c/ green t-band: x 10 laps   Standing marching, 20/LE, UE support on counter      Home Exercises Provided and Patient Education Provided     Education provided:   - Importance of HEP compliance   - Cues with exercises    Written Home Exercises Provided: Continue prior HEP  Exercises were reviewed and Jian was able to demonstrate them prior to the end of the session.  Jian demonstrated good  understanding of the education provided.     See EMR under Patient Instructions for exercises provided 8/20/2019.    Assessment     Pt tolerated treatment well today with no reports of increased pain throughout treatment. Pt tolerated increased standing exercises, but he does require occasional seated rest breaks, as well as cues for upright posture t/o. Will cont to progress per patient's tolerance.      Jian is progressing well towards his goals.   Pt prognosis is Good.     Pt will continue to benefit from skilled outpatient physical therapy to address the deficits listed in the problem list box on initial evaluation, provide pt/family education and to maximize pt's level of independence in the home and community environment.   Pt's spiritual, cultural and educational needs considered and pt agreeable to plan of care and goals.    Anticipated barriers to physical therapy: co-morbidities    Goals:  Short Term Goals: 4 weeks   1. Pt will tolerate HEP for improved strength, functional mobility, ROM, posture, and endurance. (progressing, not met)  2. Pt will demo >/= 4/5 strength in BLE's for improved functional mobility, endurance, and posture. (met, except for hip  Ext)  3. Pt will reduce TUG (timed up and go) time to </= 17 sec for improved safety with community ambulation and decreased falls risk. (exceeded 9/10)  4. Pt will perform 9 sit <> stands with use of armrests in 30s for improved endurance. (exceeded, 9/10)  5. Pt will maintain balance in MCTSIB (Modified Clinical Test of Sensory Interaction on Balance) scenario 3 for >/=30s for improved balance/decreased falls risk. (met, 9/10)  6. Pt will report feeling more steady/confident on his feet when walking at home and in the community for improved functional mobility. (progressing, not met)     Long Term Goals: 8 weeks   1. Pt will be I with updated HEP for improved functional mobility, posture, strength, and endurance. (progressing, not met)  2. Pt will demo 5/5 strength in BLE's for improved functional mobility, endurance, and posture. (progressing, not met)  3. Pt will reduce TUG (timed up and go) time to </= 11 sec for improved safety with community ambulation and decreased falls risk. (progressing, not met)  4. Pt will perform 11 sit <> stands with use of armrests in 30s for improved endurance. (progressing, not met)  5. Pt will maintain balance in MCTSIB (Modified Clinical Test of Sensory Interaction on Balance) scenario 4 for >/=30s for improved balance/decreased falls risk. (progressing, not met)  6. Pt will report/demo negotiating 1 flight of stairs with Mod I for improved functional mobility at home and in the community. (progressing, not met)    Plan     Continue POC. Progress as tolerated.    Dora Mendez, PT

## 2019-09-20 ENCOUNTER — CLINICAL SUPPORT (OUTPATIENT)
Dept: REHABILITATION | Facility: HOSPITAL | Age: 64
End: 2019-09-20
Payer: MEDICARE

## 2019-09-20 DIAGNOSIS — R53.1 DECREASED STRENGTH: ICD-10-CM

## 2019-09-20 DIAGNOSIS — Z74.09 IMPAIRED FUNCTIONAL MOBILITY, BALANCE, GAIT, AND ENDURANCE: ICD-10-CM

## 2019-09-20 PROCEDURE — 97110 THERAPEUTIC EXERCISES: CPT | Mod: PO

## 2019-09-20 PROCEDURE — 97140 MANUAL THERAPY 1/> REGIONS: CPT | Mod: PO

## 2019-09-20 NOTE — PROGRESS NOTES
"  Physical Therapy Daily Treatment Note     Name: Jian JASMINE [facility]  Clinic Number: [id number]    Therapy Diagnosis:   Encounter Diagnoses   Name Primary?    Decreased strength     Impaired functional mobility, balance, gait, and endurance      Physician: Liliane Churchill MD    Visit Date: 2019    Physician Orders: PT Eval and Treat   Medical Diagnosis from Referral: Decreased mobility  Evaluation Date: 2019  Authorization Period Expiration: 19  Plan of Care Expiration: 10/4/19  Visit # / Visits authorized:      Time In:  11:00 AM  Time Out: 12:00 PM   Total Billable Time: 25 minutes      Precautions: Standard, Diabetes, Fall and Cardiac, BLE lymphedema    Subjective     Pt reports: he is still in pain. No changes since last tx session.     He was somewhat compliant with home exercise program.     Response to previous treatment: no adverse reaction  Functional change: he get into his truck now without use of step stool    Pain: 1/10    Location: R abdomen/flank    Objective   Jian received therapeutic exercises to develop strength, endurance, ROM and core stabilization for 55 minutes includin:1 for 55 min    warm-up  · Recumbent bike, 10 min, level 2  Sitting  · Seated kicks, 20 B  · Seated marching x 25 B  Standing  · Lateral walking with GTB at knees: 3 laps at counter  · Heel raises, 20 reps at counter - NP  · Standing hip ABD, 20/LE  · Hip Ext at counter, 2 x 10 reps/LE  · Hip flexion standing, 2 x 10  · Hip flexor stretch at stairs : 3 x 30'' B  · Stairs ascending and descending :4 steps  x 5 rounds reciprocally c/ B UE  · Step-ups at bottom step of therapy stairs, 2 x 10/L  Machines  · Hamstring curls, 2 x 10, LE support on Precor bar  · Precor Leg press: 80#, 2x10    Not performed today:  Supine clamshells c/ green t-band : 2 x 10   LTR c/ green t-band: x 15 B   Seated HSS: 4 x 15" B  Sit<>Stands from hi-low table holding yellow weighted ball: 2 x 10  Supine hip adduction isometric " with ball : 2 x 10 reps , 5'' hold   Supine bridges : 2 x 10   Prone laying hip flexor stretch : 2' - NP  Mini-squats, 2 x 10, UE support on counter  Side steps at counter c/ green t-band: x 10 laps   Standing marching, 20/LE, UE support on counter      Home Exercises Provided and Patient Education Provided     Education provided:   - Importance of HEP compliance   - Cues with exercises    Written Home Exercises Provided: Continue prior HEP  Exercises were reviewed and Jian was able to demonstrate them prior to the end of the session.  Jian demonstrated good  understanding of the education provided.     See EMR under Patient Instructions for exercises provided 8/20/2019.    Assessment   Pt had fair tolerance to exercise progression reporting fatigue denying pain.      Jian is progressing well towards his goals.   Pt prognosis is Good.     Pt will continue to benefit from skilled outpatient physical therapy to address the deficits listed in the problem list box on initial evaluation, provide pt/family education and to maximize pt's level of independence in the home and community environment.   Pt's spiritual, cultural and educational needs considered and pt agreeable to plan of care and goals.    Anticipated barriers to physical therapy: co-morbidities    Goals:  Short Term Goals: 4 weeks   1. Pt will tolerate HEP for improved strength, functional mobility, ROM, posture, and endurance. (progressing, not met)  2. Pt will demo >/= 4/5 strength in BLE's for improved functional mobility, endurance, and posture. (met, except for hip Ext)  3. Pt will reduce TUG (timed up and go) time to </= 17 sec for improved safety with community ambulation and decreased falls risk. (exceeded 9/10)  4. Pt will perform 9 sit <> stands with use of armrests in 30s for improved endurance. (exceeded, 9/10)  5. Pt will maintain balance in MCTSIB (Modified Clinical Test of Sensory Interaction on Balance) scenario 3 for >/=30s for improved  balance/decreased falls risk. (met, 9/10)  6. Pt will report feeling more steady/confident on his feet when walking at home and in the community for improved functional mobility. (progressing, not met)     Long Term Goals: 8 weeks   1. Pt will be I with updated HEP for improved functional mobility, posture, strength, and endurance. (progressing, not met)  2. Pt will demo 5/5 strength in BLE's for improved functional mobility, endurance, and posture. (progressing, not met)  3. Pt will reduce TUG (timed up and go) time to </= 11 sec for improved safety with community ambulation and decreased falls risk. (progressing, not met)  4. Pt will perform 11 sit <> stands with use of armrests in 30s for improved endurance. (progressing, not met)  5. Pt will maintain balance in MCTSIB (Modified Clinical Test of Sensory Interaction on Balance) scenario 4 for >/=30s for improved balance/decreased falls risk. (progressing, not met)  6. Pt will report/demo negotiating 1 flight of stairs with Mod I for improved functional mobility at home and in the community. (progressing, not met)    Plan     Continue POC. Progress as tolerated.    Melissa Mcdonald, PT

## 2019-09-23 ENCOUNTER — CLINICAL SUPPORT (OUTPATIENT)
Dept: REHABILITATION | Facility: HOSPITAL | Age: 64
End: 2019-09-23
Payer: MEDICARE

## 2019-09-23 DIAGNOSIS — R53.1 DECREASED STRENGTH: ICD-10-CM

## 2019-09-23 DIAGNOSIS — Z74.09 IMPAIRED FUNCTIONAL MOBILITY, BALANCE, GAIT, AND ENDURANCE: ICD-10-CM

## 2019-09-23 PROCEDURE — 97110 THERAPEUTIC EXERCISES: CPT | Mod: PO

## 2019-09-23 NOTE — PROGRESS NOTES
"  Physical Therapy Daily Treatment Note     Name: Jian Arrieta  Clinic Number: [id number]    Therapy Diagnosis:   Encounter Diagnoses   Name Primary?    Decreased strength     Impaired functional mobility, balance, gait, and endurance      Physician: Liliane Churchill MD    Visit Date: 2019    Physician Orders: PT Eval and Treat   Medical Diagnosis from Referral: Decreased mobility  Evaluation Date: 2019  Authorization Period Expiration: 19  Plan of Care Expiration: 10/4/19  Visit # / Visits authorized:      Time In:  11:00 AM  Time Out: 12:00 PM   Total Billable Time: 25 minutes      Precautions: Standard, Diabetes, Fall and Cardiac, BLE lymphedema    Subjective     Pt reports: Feeling better today. "I walked up and down the stairs in my garage two times." Pt also states he'd like PT to contact his MD for a referral for lymphedema therapy at [facility]. PT to reach out to MD, but PT explained pt will need to contact [facility] about receiving the order/referral    He was somewhat compliant with home exercise program.     Response to previous treatment: no adverse reaction  Functional change: able to go up and down stairs 2 times    Pain: 1/10    Location: R abdomen/flank    Objective   Jian received therapeutic exercises to develop strength, endurance, ROM and core stabilization for 50 minutes includin:1 for 25min    Warm-up  · Upright bike, 8 min, level 1  Sitting  · Seated kicks, 20 B  · Seated marching x 25 B  · Sit <> stands from high-low table, 10 reps  · Hamstring stretch, 1 min/LE  ·   Standing  · Standing hamstring curls, 2 x 10  · Standing hip ABD, 20/LE  · Hip Ext at counter, 2 x 10 reps/LE  · Hip flexion standing, 2 x 10  · Wall squat with back to green physioball, 2 x 10  · Step-ups at bottom step of therapy stairs, 2 x 10/L  · Lateral step-ups, 10/LE    Machines  · Precor Leg press: 80#, 2x12    Not performed today:  Supine clamshells c/ green t-band : 2 x 10   LTR c/ green " "t-band: x 15 B   Seated HSS: 4 x 15" B  Sit<>Stands from hi-low table holding yellow weighted ball: 2 x 10  Supine hip adduction isometric with ball : 2 x 10 reps , 5'' hold   Supine bridges : 2 x 10   Prone laying hip flexor stretch : 2' - NP  Mini-squats, 2 x 10, UE support on counter  Side steps at counter c/ green t-band: x 10 laps   Standing marching, 20/LE, UE support on counter      Home Exercises Provided and Patient Education Provided     Education provided:   - Importance of HEP compliance   - Cues with exercises    Written Home Exercises Provided: Continue prior HEP  Exercises were reviewed and Jian was able to demonstrate them prior to the end of the session.  Jian demonstrated good  understanding of the education provided.     See EMR under Patient Instructions for exercises provided 8/20/2019.    Assessment   Pt had good tolerance to exercise progression reporting fatigue denying pain. Pt able to complete increased standing exercises, and he reports improved ability to negotiate stairs in his garage. Pt has met long term goal #6. Pt will benefit from continued PT to work towards remaining goals. Will progress as tolerated.     Jian is progressing well towards his goals.   Pt prognosis is Good.     Pt will continue to benefit from skilled outpatient physical therapy to address the deficits listed in the problem list box on initial evaluation, provide pt/family education and to maximize pt's level of independence in the home and community environment.   Pt's spiritual, cultural and educational needs considered and pt agreeable to plan of care and goals.    Anticipated barriers to physical therapy: co-morbidities    Goals:  Short Term Goals: 4 weeks   1. Pt will tolerate HEP for improved strength, functional mobility, ROM, posture, and endurance. (progressing, not met)  2. Pt will demo >/= 4/5 strength in BLE's for improved functional mobility, endurance, and posture. (met, except for hip Ext)  3. Pt will " reduce TUG (timed up and go) time to </= 17 sec for improved safety with community ambulation and decreased falls risk. (exceeded 9/10)  4. Pt will perform 9 sit <> stands with use of armrests in 30s for improved endurance. (exceeded, 9/10)  5. Pt will maintain balance in MCTSIB (Modified Clinical Test of Sensory Interaction on Balance) scenario 3 for >/=30s for improved balance/decreased falls risk. (met, 9/10)  6. Pt will report feeling more steady/confident on his feet when walking at home and in the community for improved functional mobility. (progressing, not met)     Long Term Goals: 8 weeks   1. Pt will be I with updated HEP for improved functional mobility, posture, strength, and endurance. (progressing, not met)  2. Pt will demo 5/5 strength in BLE's for improved functional mobility, endurance, and posture. (progressing, not met)  3. Pt will reduce TUG (timed up and go) time to </= 11 sec for improved safety with community ambulation and decreased falls risk. (progressing, not met)  4. Pt will perform 11 sit <> stands with use of armrests in 30s for improved endurance. (progressing, not met)  5. Pt will maintain balance in MCTSIB (Modified Clinical Test of Sensory Interaction on Balance) scenario 4 for >/=30s for improved balance/decreased falls risk. (progressing, not met)  6. Pt will report/demo negotiating 1 flight of stairs with Mod I for improved functional mobility at home and in the community. (Goal exceeded, 9/23)    Plan     Continue POC. Progress as tolerated.    Dora Mendez, PT

## 2019-09-24 ENCOUNTER — TELEPHONE (OUTPATIENT)
Dept: NEUROLOGY | Facility: HOSPITAL | Age: 64
End: 2019-09-24

## 2019-09-24 NOTE — TELEPHONE ENCOUNTER
LVM see previous  ----- Message from Ana Franz sent at 9/24/2019  9:42 AM CDT -----  Contact: self [phone]  JPB - Patient is calling to get a referral to go to [facility]. Please call

## 2019-09-24 NOTE — TELEPHONE ENCOUNTER
LVM for return call to verify intention of referral; gym vs PT. msg stated to please call on wed b/w 8a-4:30pm.   ----- Message from Reba Chavarria sent at 9/24/2019  4:37 PM CDT -----  Contact: [phone]-self  Pt states he is still waiting on a callback. Please call

## 2019-09-25 ENCOUNTER — TELEPHONE (OUTPATIENT)
Dept: NEUROLOGY | Facility: HOSPITAL | Age: 64
End: 2019-09-25

## 2019-09-25 NOTE — TELEPHONE ENCOUNTER
----- Message from Jade Saenz sent at 9/25/2019  1:27 PM CDT -----  Contact: Patient/ [phone]  JPB-----Patient is requesting a referral be sent over to [facility] for [place] to fax # [phone].  Pt stated Yamini Dukes is the person to be sending the information to.    Please call and advise.

## 2019-09-26 NOTE — TELEPHONE ENCOUNTER
----- Message from Jade Saenz sent at 9/26/2019  2:01 PM CDT -----  Contact: Patient/ [phone]  JPB----Patient is returning your call.    Please call.

## 2019-09-27 ENCOUNTER — CLINICAL SUPPORT (OUTPATIENT)
Dept: REHABILITATION | Facility: HOSPITAL | Age: 64
End: 2019-09-27
Payer: MEDICARE

## 2019-09-27 ENCOUNTER — DOCUMENTATION ONLY (OUTPATIENT)
Dept: NEUROLOGY | Facility: HOSPITAL | Age: 64
End: 2019-09-27

## 2019-09-27 ENCOUNTER — TELEPHONE (OUTPATIENT)
Dept: NEUROLOGY | Facility: HOSPITAL | Age: 64
End: 2019-09-27

## 2019-09-27 DIAGNOSIS — R53.1 DECREASED STRENGTH: ICD-10-CM

## 2019-09-27 DIAGNOSIS — Z74.09 IMPAIRED FUNCTIONAL MOBILITY, BALANCE, GAIT, AND ENDURANCE: ICD-10-CM

## 2019-09-27 PROCEDURE — 97110 THERAPEUTIC EXERCISES: CPT | Mod: PO

## 2019-09-27 NOTE — TELEPHONE ENCOUNTER
See previous  ----- Message from Jade Saenz sent at 9/27/2019  1:42 PM CDT -----  Contact: Patient/ [phone]   JPB----Patient is returning your call in regards to a referral being sent over to [facility] for [place].  Pt stated you can fax the information over to [phone].    Please call.

## 2019-09-27 NOTE — PROGRESS NOTES
Physical Therapy Daily Treatment Note     Name: Jian Arrieta  Clinic Number: [id number]    Therapy Diagnosis:   Encounter Diagnoses   Name Primary?    Decreased strength     Impaired functional mobility, balance, gait, and endurance      Physician: Liliane Churchill MD    Visit Date: 9/27/2019    Physician Orders: PT Eval and Treat   Medical Diagnosis from Referral: Decreased mobility  Evaluation Date: 8/9/2019  Authorization Period Expiration: 12/31/19  Plan of Care Expiration: 10/4/19  Visit # / Visits authorized: 10/ 50     Time In:  11:00 AM  Time Out: 12:00 PM   Total Billable Time: 45 minutes      Precautions: Standard, Diabetes, Fall and Cardiac, BLE lymphedema    Subjective     Pt reports: pt states he still hasn't gotten the referral for lymphedema PT  He was somewhat compliant with home exercise program.     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    Pain: 0/10    Location: R abdomen/flank    Objective   Jian received therapeutic exercises to develop strength, endurance, ROM and core stabilization for 45 minutes including:      Pt amb 2 x around the rehab gym at start of session     Machines  · Precor Leg press: 40#, but deeper squat, 2  X 10 reps  · Precor leg press: 80#, seat set at 8, 2 x 10 reps  · Single leg press, on precor, 20#, 10/LE  · Recumbent bike, 8 min, Level 2    Sitting  · Seated kicks, 20 B  · Seated marching x 20 B  · Hamstring stretch 2 x 30s/LE      Standing  · Standing hamstring curls, 2 x 10  · TRX Squats, 10  · Standing hip ABD, 20/LE  · Hip Ext at counter, 2 x 10 reps/LE  · Step-ups at bottom step of therapy stairs, 2 x 10/LE  · Hip flexion standing, 2 x 10 .    Not performed today:  Supine clamshells c/ green t-band : 2 x 10   LTR c/ green t-band: x 15 B   Supine hip adduction isometric with ball : 2 x 10 reps , 5'' hold   Supine bridges : 2 x 10   Prone laying hip flexor stretch : 2' - NP  Mini-squats, 2 x  10, UE support on counter  · Wall squat with back to green physioball, 2 x 10  · Lateral step-ups, 10/LE  · Wall squat with back to green physioball, 2 x 10  · Lateral step-ups, 10/LE      Home Exercises Provided and Patient Education Provided     Education provided:     - Cues with exercises    Written Home Exercises Provided: Continue prior HEP  Exercises were reviewed and Jian was able to demonstrate them prior to the end of the session.  Jian demonstrated good  understanding of the education provided.     See EMR under Patient Instructions for exercises provided 8/20/2019.    Assessment   Pt had good tolerance to exercise progression, but he demo's fatigue, requiring occasional rest breaks.. 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 Will progress as tolerated.     Jian is progressing well towards his goals.   Pt prognosis is Good.     Pt will continue to benefit from skilled outpatient physical therapy to address the deficits listed in the problem list box on initial evaluation, provide pt/family education and to maximize pt's level of independence in the home and community environment.   Pt's spiritual, cultural and educational needs considered and pt agreeable to plan of care and goals.    Anticipated barriers to physical therapy: co-morbidities    Goals:  Short Term Goals: 4 weeks   1. Pt will tolerate HEP for improved strength, functional mobility, ROM, posture, and endurance. (progressing, not met)  2. Pt will demo >/= 4/5 strength in BLE's for improved functional mobility, endurance, and posture. (met, except for hip Ext)  3. Pt will reduce TUG (timed up and go) time to </= 17 sec for improved safety with community ambulation and decreased falls risk. (exceeded 9/10)  4. Pt will perform 9 sit <> stands with use of armrests in 30s for improved endurance. (exceeded,  9/10)  5. Pt will maintain balance in MCTSIB (Modified Clinical Test of Sensory Interaction on Balance) scenario 3 for >/=30s for improved balance/decreased falls risk. (met, 9/10)  6. Pt will report feeling more steady/confident on his feet when walking at home and in the community for improved functional mobility. (progressing, not met)     Long Term Goals: 8 weeks   1. Pt will be I with updated HEP for improved functional mobility, posture, strength, and endurance. (progressing, not met)  2. Pt will demo 5/5 strength in BLE's for improved functional mobility, endurance, and posture. (progressing, not met)  3. Pt will reduce TUG (timed up and go) time to </= 11 sec for improved safety with community ambulation and decreased falls risk. (progressing, not met)  4. Pt will perform 11 sit <> stands with use of armrests in 30s for improved endurance. (progressing, not met)  5. Pt will maintain balance in MCTSIB (Modified Clinical Test of Sensory Interaction on Balance) scenario 4 for >/=30s for improved balance/decreased falls risk. (progressing, not met)  6. Pt will report/demo negotiating 1 flight of stairs with Mod I for improved functional mobility at home and in the community. (Goal exceeded, 9/23)    Plan     Continue POC. Progress as tolerated.    Dora Mendez, PT

## 2019-10-01 ENCOUNTER — CLINICAL SUPPORT (OUTPATIENT)
Dept: REHABILITATION | Facility: HOSPITAL | Age: 64
End: 2019-10-01
Payer: MEDICARE

## 2019-10-01 DIAGNOSIS — Z74.09 IMPAIRED FUNCTIONAL MOBILITY, BALANCE, GAIT, AND ENDURANCE: ICD-10-CM

## 2019-10-01 DIAGNOSIS — R53.1 DECREASED STRENGTH: ICD-10-CM

## 2019-10-01 PROCEDURE — 97110 THERAPEUTIC EXERCISES: CPT | Mod: PO

## 2019-10-01 NOTE — PROGRESS NOTES
Physical Therapy Daily Treatment Note     Name: Jian Malcolmkley  Clinic Number: [id number]    Therapy Diagnosis:   Encounter Diagnoses   Name Primary?    Decreased strength     Impaired functional mobility, balance, gait, and endurance      Physician: Liliane Churchill MD    Visit Date: 10/1/2019    Physician Orders: PT Eval and Treat   Medical Diagnosis from Referral: Decreased mobility  Evaluation Date: 8/9/2019  Authorization Period Expiration: 12/31/19  Plan of Care Expiration: 10/4/19  Visit # / Visits authorized: 11/ 50     Time In:  11:03 AM  Time Out: 12:00 PM   Total Billable Time: 30 minutes      Precautions: Standard, Diabetes, Fall and Cardiac, BLE lymphedema    Subjective     Pt reports: pain in his side today.   He was somewhat compliant with home exercise program.     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    Pain: 3/10    Location: R abdomen/flank    Objective   Jian received therapeutic exercises to develop strength, endurance, ROM and core stabilization for 55 minutes including:    Machines  · Precor Leg press: 20#, but deeper squat, 2  X 20 reps  · Precor leg press: 80#,  x 10 reps  · Single leg press, on precor, 20#, 10/LE  · Recumbent bike, 8 min, Level 2    Sitting  · Seated kicks, 20 B  · Seated marching x 20 B  · Hamstring stretch 2 x 30s/LE      Standing  · Standing hamstring curls, 2 x 10  · TRX Squats, 10  · Standing hip ABD, 20/LE  · Hip Ext at counter, 2 x 10 reps/LE  · Step-ups at bottom step of therapy stairs, 2 x 10/LE - NP   · Hip flexion standing, 2 x 10     Not performed today:  Supine clamshells c/ green t-band : 2 x 10   LTR c/ green t-band: x 15 B   Supine hip adduction isometric with ball : 2 x 10 reps , 5'' hold   Supine bridges : 2 x 10   Prone laying hip flexor stretch : 2' - NP  Mini-squats, 2 x 10, UE support on counter  · Wall squat with back to green physioball, 2 x 10  · Lateral step-ups,  10/LE  · Wall squat with back to green [facility], 2 x 10  · Lateral step-ups, 10/LE      Home Exercises Provided and Patient Education Provided     Education provided:     - Cues with exercises    Written Home Exercises Provided: Continue prior HEP  Exercises were reviewed and Jian was able to demonstrate them prior to the end of the session.  Jian demonstrated good  understanding of the education provided.     See EMR under Patient Instructions for exercises provided 8/20/2019.    Assessment   Pt had good tolerance to exercises performed today and with proper fatigue post treatment. Pt will benefit from continued PT to work towards remaining goals. Will progress as tolerated.     Jian is progressing well towards his goals.   Pt prognosis is Good.     Pt will continue to benefit from skilled outpatient physical therapy to address the deficits listed in the problem list box on initial evaluation, provide pt/family education and to maximize pt's level of independence in the home and community environment.   Pt's spiritual, cultural and educational needs considered and pt agreeable to plan of care and goals.    Anticipated barriers to physical therapy: co-morbidities    Goals:  Short Term Goals: 4 weeks   1. Pt will tolerate HEP for improved strength, functional mobility, ROM, posture, and endurance. (progressing, not met)  2. Pt will demo >/= 4/5 strength in BLE's for improved functional mobility, endurance, and posture. (met, except for hip Ext)  3. Pt will reduce TUG (timed up and go) time to </= 17 sec for improved safety with community ambulation and decreased falls risk. (exceeded 9/10)  4. Pt will perform 9 sit <> stands with use of armrests in 30s for improved endurance. (exceeded, 9/10)  5. Pt will maintain balance in MCTSIB (Modified Clinical Test of Sensory Interaction on Balance) scenario 3 for >/=30s for improved balance/decreased falls risk. (met, 9/10)  6. Pt will report feeling more steady/confident on  his feet when walking at home and in the community for improved functional mobility. (progressing, not met)     Long Term Goals: 8 weeks   1. Pt will be I with updated HEP for improved functional mobility, posture, strength, and endurance. (progressing, not met)  2. Pt will demo 5/5 strength in BLE's for improved functional mobility, endurance, and posture. (progressing, not met)  3. Pt will reduce TUG (timed up and go) time to </= 11 sec for improved safety with community ambulation and decreased falls risk. (progressing, not met)  4. Pt will perform 11 sit <> stands with use of armrests in 30s for improved endurance. (progressing, not met)  5. Pt will maintain balance in MCTSIB (Modified Clinical Test of Sensory Interaction on Balance) scenario 4 for >/=30s for improved balance/decreased falls risk. (progressing, not met)  6. Pt will report/demo negotiating 1 flight of stairs with Mod I for improved functional mobility at home and in the community. (Goal exceeded, 9/23)    Plan     Continue POC. Progress as tolerated.    Oralia Be, PTA

## 2019-10-03 ENCOUNTER — CLINICAL SUPPORT (OUTPATIENT)
Dept: REHABILITATION | Facility: HOSPITAL | Age: 64
End: 2019-10-03
Payer: MEDICARE

## 2019-10-03 DIAGNOSIS — Z74.09 IMPAIRED FUNCTIONAL MOBILITY, BALANCE, GAIT, AND ENDURANCE: ICD-10-CM

## 2019-10-03 DIAGNOSIS — R53.1 DECREASED STRENGTH: ICD-10-CM

## 2019-10-03 PROCEDURE — 97750 PHYSICAL PERFORMANCE TEST: CPT | Mod: PO

## 2019-10-03 PROCEDURE — 97110 THERAPEUTIC EXERCISES: CPT | Mod: PO

## 2019-10-03 NOTE — PLAN OF CARE
Outpatient Therapy Updated Plan of Care     Visit Date: 10/3/2019  Name: Jian Arrieta  Clinic Number: [id number]    Therapy Diagnosis:   Encounter Diagnoses   Name Primary?    Decreased strength     Impaired functional mobility, balance, gait, and endurance      Physician: Liliane Churchill MD    Physician Orders: PT Eval and Treat   Medical Diagnosis from Referral: Decreased mobility  Evaluation Date: 8/9/2019    Total Visits Received: 13  Cancelled Visits: unknown  No Show Visits: 0    Current Certification Period:  8/9/19 to 10/4/19  Precautions: Standard, Diabetes, Fall and Cardiac, BLE lymphedema  Visits from Evaluation Date:  12      Subjective     Update: pt denies pain today. Pt reports he wants to be able to get himself up from the floor- pt states he ended up on his knee the other day, and was able to get up using a chair, but he says it wasn't where it should be. 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 Pt states he wants to be able to get up from low surfaces more easily.    0/10 pain today.    Objective     Update: Lower Extremity Strength (Seated)  Right LE   Left LE     Knee extension: 5/5 Knee extension: 5/5   Knee flexion: 5/5 Knee flexion: 5/5   Hip flexion: 5/5 Hip flexion: 4+/5   Hip extension:  >/=3/5 Hip extension: >/=3/5   Hip abduction: 4+/5 Hip abduction: 4+/5   Hip adduction: 5/5 Hip adduction 5/5   Ankle dorsiflexion: 5/5 Ankle dorsiflexion: 5/5   Ankle plantarflexion: 5/5 ( in available range) Ankle plantarflexion: 5/5            Special Tests:  - Sit <> stands in 30s (from gold chair): 13 reps with arms on thighs  -TUG from gold chair, 11s, no device     MCTSIB (Modified Clinical Test of Sensory Interaction on Balance):  1. Eyes Open/feet together/Firm: 30 seconds  2. Eyes Closed/feet together/Firm: 30 seconds  3. Eyes Open/feet together/Foam: 30 seconds  4. Eyes Closed/feet together/Foam: 12 seconds      Assessment     Update: Pt had good  tolerance to exercises performed today and with proper fatigue post treatment. Pt reports reduced pain, improved ability to negotiate stairs and get in and out of his truck. Pt endorses difficulty getting up from a low surface and getting up from kneeling position or on the floor. Pt demo's increased LE strength, endurance, and balance. Pt has met goals as noted below. Pt will benefit from continued PT to work towards remaining/updated goals. Will progress as tolerated with emphasis on strength for getting up from low surfaces. Extending POC to 11/1/19    Previous Short Term Goals Status:   Met partially  New Short Term Goals Status:   N/A  Long Term Goal Status:   continue per initial plan of care with addition of new goal:  New goal:  7. Pt will report improved ability to stand from low surface and/or kneeling position for improved functional mobility. (progressing, not met)  Reasons for Recertification of Therapy:   Pt making steady progress and will benefit from continued therapy to address remaining deficits    Plan     Updated Certification Period: 10/3/2019 to 11/1/19  Recommended Treatment Plan: 2 times per week for 8 weeks: Gait Training, Moist Heat/ Ice, Neuromuscular Re-ed, Orthotic Management and Training, Patient Education, Self Care, Therapeutic Activites, Therapeutic Exercise and modalities as appropriate  Other Recommendations: none    Dora Mendez, PT  10/3/2019      I CERTIFY THE NEED FOR THESE SERVICES FURNISHED UNDER THIS PLAN OF TREATMENT AND WHILE UNDER MY CARE    Physician's comments:        Physician's Signature: ___________________________________________________

## 2019-10-03 NOTE — PROGRESS NOTES
Physical Therapy Daily Treatment Note     Name: Jian Arrieta  [facility] Number: [id number]    Therapy Diagnosis:   Encounter Diagnoses   Name Primary?    Decreased strength     Impaired functional mobility, balance, gait, and endurance      Physician: Liliane Churchill MD    Visit Date: 10/3/2019    Physician Orders: PT Eval and Treat   Medical Diagnosis from Referral: Decreased mobility  Evaluation Date: 8/9/2019  Authorization Period Expiration: 12/31/19  Updated Plan of Care Expiration: 11/1/19  Visit # / Visits authorized: 12/ 50     Time In:  11:00 AM  Time Out: 12:00 PM   Total Billable Time: 40 minutes      Precautions: Standard, Diabetes, Fall and Cardiac, BLE lymphedema    Subjective     Pt reports: See POC for details  He was somewhat compliant with home exercise program.     Response to previous treatment: no adverse reaction  Functional change: able to get in/out of his truck 7 times    Pain: 0/10    Location: R abdomen/flank    Objective     Jian received physical performance testing with report x 15 minutes, parvez refer to POC for details    Jian received therapeutic exercises to develop strength, endurance, ROM and core stabilization for 25 minutes including:    Machines  · Precor Leg press: 20#, but deeper squat, 2  X 20 reps  · Precor leg press: 80#, 2  x 10 reps  · Single leg press, on precor, 20#, 10/LE  · Recumbent bike, 8 min, Level 2    Sitting  · Hamstring stretch 2 x 30s/LE  · Sit <> stands x 10 reps, no hands      Standing  · Standing hamstring curls, 2 x 10  · TRX Squats, 10  · Standing hip ABD, 10/LE  · Hip flexion standing, 2 x 10, on airex    Not performed today:  Supine clamshells c/ green t-band : 2 x 10   LTR c/ green t-band: x 15 B   Supine hip adduction isometric with ball : 2 x 10 reps , 5'' hold   Supine bridges : 2 x 10   Prone laying hip flexor stretch : 2' - NP  Mini-squats, 2 x 10, UE support on counter  · Hip Ext at counter, 2 x 10 reps/LE  · Step-ups at bottom step of  therapy stairs, 2 x 10/LE - NP       Home Exercises Provided and Patient Education Provided     Education provided:     - Cues with exercises, POC, progress to date    Written Home Exercises Provided: Continue prior HEP  Exercises were reviewed and Jian was able to demonstrate them prior to the end of the session.  Jian demonstrated good  understanding of the education provided.     See EMR under Patient Instructions for exercises provided 8/20/2019.    Assessment   Pt had good tolerance to exercises performed today and with proper fatigue post treatment. Pt reports reduced pain, improved ability to negotiate stairs and get in and out of his truck. Pt endorses difficulty getting up from a low surface and getting up from kneeling position or on the floor. Pt demo's increased LE strength, endurance, and balance. Pt has met goals as noted below. Pt will benefit from continued PT to work towards remaining/updated goals. Will progress as tolerated with emphasis on strength for getting up from low surfaces. Extending POC to 11/1/19     Jian is progressing well towards his goals.   Pt prognosis is Good.     Pt will continue to benefit from skilled outpatient physical therapy to address the deficits listed in the problem list box on initial evaluation, provide pt/family education and to maximize pt's level of independence in the home and community environment.   Pt's spiritual, cultural and educational needs considered and pt agreeable to plan of care and goals.    Anticipated barriers to physical therapy: co-morbidities    Goals:  Short Term Goals: 4 weeks   1. Pt will tolerate HEP for improved strength, functional mobility, ROM, posture, and endurance. (progressing, not met)  2. Pt will demo >/= 4/5 strength in BLE's for improved functional mobility, endurance, and posture. (met, except for hip Ext)  3. Pt will reduce TUG (timed up and go) time to </= 17 sec for improved safety with community ambulation and decreased  falls risk. (exceeded 9/10)  4. Pt will perform 9 sit <> stands with use of armrests in 30s for improved endurance. (exceeded, 9/10)  5. Pt will maintain balance in MCTSIB (Modified Clinical Test of Sensory Interaction on Balance) scenario 3 for >/=30s for improved balance/decreased falls risk. (met, 9/10)  6. Pt will report feeling more steady/confident on his feet when walking at home and in the community for improved functional mobility. (Met, 10/3)     Long Term Goals: 8 weeks   1. Pt will be I with updated HEP for improved functional mobility, posture, strength, and endurance. (progressing, not met)  2. Pt will demo 5/5 strength in BLE's for improved functional mobility, endurance, and posture. (progressing, not met)  3. Pt will reduce TUG (timed up and go) time to </= 11 sec for improved safety with community ambulation and decreased falls risk. (Met, 10/3  4. Pt will perform 11 sit <> stands with use of armrests in 30s for improved endurance. (Exceeded, 10/3)  5. Pt will maintain balance in MCTSIB (Modified Clinical Test of Sensory Interaction on Balance) scenario 4 for >/=30s for improved balance/decreased falls risk. (progressing, not met)  6. Pt will report/demo negotiating 1 flight of stairs with Mod I for improved functional mobility at home and in the community. (Met, 10/3)    New goal:  7. Pt will report improved ability to stand from low surface and/or kneeling position for improved functional mobility. (progressing, not met)    Plan     Continue POC. Progress as tolerated.    Dora Mendez, PT

## 2019-10-07 ENCOUNTER — CLINICAL SUPPORT (OUTPATIENT)
Dept: REHABILITATION | Facility: HOSPITAL | Age: 64
End: 2019-10-07
Payer: MEDICARE

## 2019-10-07 DIAGNOSIS — Z74.09 IMPAIRED FUNCTIONAL MOBILITY, BALANCE, GAIT, AND ENDURANCE: ICD-10-CM

## 2019-10-07 DIAGNOSIS — R53.1 DECREASED STRENGTH: ICD-10-CM

## 2019-10-07 PROCEDURE — 97110 THERAPEUTIC EXERCISES: CPT | Mod: PO

## 2019-10-15 ENCOUNTER — TELEPHONE (OUTPATIENT)
Dept: INTERNAL MEDICINE | Facility: CLINIC | Age: 64
End: 2019-10-15

## 2019-10-15 DIAGNOSIS — E11.9 TYPE 2 DIABETES MELLITUS WITHOUT COMPLICATION, WITH LONG-TERM CURRENT USE OF INSULIN: Primary | ICD-10-CM

## 2019-10-15 DIAGNOSIS — Z79.4 TYPE 2 DIABETES MELLITUS WITHOUT COMPLICATION, WITH LONG-TERM CURRENT USE OF INSULIN: Primary | ICD-10-CM

## 2019-10-15 NOTE — TELEPHONE ENCOUNTER
[facility] requested orders for diabetic supplies.    See  Orders pended to print in meds/order so we can fax to [employer]  [facility].    Thanks tracie

## 2019-10-16 RX ORDER — DEXTROSE 4 G
TABLET,CHEWABLE ORAL
Qty: 1 EACH | Refills: 0 | Status: SHIPPED | OUTPATIENT
Start: 2019-10-16 | End: 2020-09-16

## 2019-10-16 RX ORDER — LANCETS
EACH MISCELLANEOUS
Qty: 200 EACH | Refills: 11 | Status: SHIPPED | OUTPATIENT
Start: 2019-10-16 | End: 2020-09-16

## 2019-10-17 ENCOUNTER — PATIENT OUTREACH (OUTPATIENT)
Dept: ADMINISTRATIVE | Facility: HOSPITAL | Age: 64
End: 2019-10-17

## 2019-10-24 ENCOUNTER — CLINICAL SUPPORT (OUTPATIENT)
Dept: REHABILITATION | Facility: HOSPITAL | Age: 64
End: 2019-10-24
Payer: MEDICARE

## 2019-10-24 DIAGNOSIS — Z74.09 IMPAIRED FUNCTIONAL MOBILITY, BALANCE, GAIT, AND ENDURANCE: ICD-10-CM

## 2019-10-24 DIAGNOSIS — R53.1 DECREASED STRENGTH: ICD-10-CM

## 2019-10-24 PROCEDURE — 97110 THERAPEUTIC EXERCISES: CPT | Mod: PO

## 2019-10-24 NOTE — PROGRESS NOTES
"  Physical Therapy Daily Treatment Note     Name: Jian JASMINE Meenakshi  Clinic Number: [id number]    Therapy Diagnosis:   Encounter Diagnoses   Name Primary?    Decreased strength     Impaired functional mobility, balance, gait, and endurance      Physician: Liliane Churchill MD    Visit Date: 10/24/2019    Physician Orders: PT Eval and Treat   Medical Diagnosis from Referral: Decreased mobility  Evaluation Date: 2019  Authorization Period Expiration: 19  Updated Plan of Care Expiration: 19  Visit # / Visits authorized: 14 ( newly approved visits)     Time In: 10:00 AM  Time Out: 11:00 AM   Total Billable Time: 20minutes      Precautions: Standard, Diabetes, Fall and Cardiac, BLE lymphedema    Subjective     Pt reports: he started lymphedema therapy for his legs today. Pt reports he got down on the floor and was able to get up on his own.  He was somewhat compliant with home exercise program.     Response to previous treatment: no adverse reaction  Functional change: able to get up and down from the floor.    Pain: 0/10    Location: R abdomen/flank    Objective   Pt received amb independently.      Jian received therapeutic exercises to develop strength, endurance, ROM and core stabilization for 52 minutes includin:1 for 20 min    Machines  · Precor Leg press: 20#, but deeper squat, 2  X 10 reps  · Precor leg press: 80#, 3  x 10 reps  · Recumbent bike, 8 min, Level 2    Sitting    · Seated marching, 2 x 10 reps/LE -not performed today  · Seated kicks, 2 x 10 reps,/LE  · Sit <> stand, 2 x 10 reps, no hands, 10# kettlebell upside down    Supine    · Hamstring stretch seated, 2 x 30s/LE  · Bridges, 2 x 10 reps, 3s holds  · UE pull down in hook-lying, 2 x 10 reps, pink sports cord  · Clamshell, 2 x 10/LE      Standing  · Step- up on 8" step, 20/LE, UE Support on Precor bar  · Lateral step-up on 8" step, 10/LE  · Standing hip Ext, 2 x 10/LE  · Hip flexion standing, 2 x 10, on airex  · Hip ABD standing, " 2 x10, on airex          Home Exercises Provided and Patient Education Provided     Education provided:     - Cues with exercises, POC, progress to date    Written Home Exercises Provided: Continue prior HEP  Exercises were reviewed and Jian was able to demonstrate them prior to the end of the session.  Jian demonstrated good  understanding of the education provided.     See EMR under Patient Instructions for exercises provided 8/20/2019.    Assessment   Pt had good tolerance to exercises performed today and with proper fatigue post treatment. Pt reports continued improvement with being able to get up from the floor by himself.  Pt will benefit from continued PT to work towards remaining/updated goals. Will progress as tolerated with emphasis on strength for getting up from low surfaces.      Jian is progressing well towards his goals.   Pt prognosis is Good.     Pt will continue to benefit from skilled outpatient physical therapy to address the deficits listed in the problem list box on initial evaluation, provide pt/family education and to maximize pt's level of independence in the home and community environment.   Pt's spiritual, cultural and educational needs considered and pt agreeable to plan of care and goals.    Anticipated barriers to physical therapy: co-morbidities    Goals:  Short Term Goals: 4 weeks   1. Pt will tolerate HEP for improved strength, functional mobility, ROM, posture, and endurance. (progressing, not met)  2. Pt will demo >/= 4/5 strength in BLE's for improved functional mobility, endurance, and posture. (met, except for hip Ext)  3. Pt will reduce TUG (timed up and go) time to </= 17 sec for improved safety with community ambulation and decreased falls risk. (exceeded 9/10)  4. Pt will perform 9 sit <> stands with use of armrests in 30s for improved endurance. (exceeded, 9/10)  5. Pt will maintain balance in MCTSIB (Modified Clinical Test of Sensory Interaction on Balance) scenario 3 for  >/=30s for improved balance/decreased falls risk. (met, 9/10)  6. Pt will report feeling more steady/confident on his feet when walking at home and in the community for improved functional mobility. (Met, 10/3)     Long Term Goals: 8 weeks   1. Pt will be I with updated HEP for improved functional mobility, posture, strength, and endurance. (progressing, not met)  2. Pt will demo 5/5 strength in BLE's for improved functional mobility, endurance, and posture. (progressing, not met)  3. Pt will reduce TUG (timed up and go) time to </= 11 sec for improved safety with community ambulation and decreased falls risk. (Met, 10/3  4. Pt will perform 11 sit <> stands with use of armrests in 30s for improved endurance. (Exceeded, 10/3)  5. Pt will maintain balance in MCTSIB (Modified Clinical Test of Sensory Interaction on Balance) scenario 4 for >/=30s for improved balance/decreased falls risk. (progressing, not met)  6. Pt will report/demo negotiating 1 flight of stairs with Mod I for improved functional mobility at home and in the community. (Met, 10/3)    New goal:  7. Pt will report improved ability to stand from low surface and/or kneeling position for improved functional mobility. (Met, 10/24)    Plan     Continue POC. Progress as tolerated.    Dora Mendez, PT

## 2019-10-28 ENCOUNTER — CLINICAL SUPPORT (OUTPATIENT)
Dept: REHABILITATION | Facility: HOSPITAL | Age: 64
End: 2019-10-28
Payer: MEDICARE

## 2019-10-28 DIAGNOSIS — Z74.09 IMPAIRED FUNCTIONAL MOBILITY, BALANCE, GAIT, AND ENDURANCE: ICD-10-CM

## 2019-10-28 DIAGNOSIS — R53.1 DECREASED STRENGTH: ICD-10-CM

## 2019-10-28 PROCEDURE — 97110 THERAPEUTIC EXERCISES: CPT | Mod: PO

## 2019-10-28 NOTE — PROGRESS NOTES
"  Physical Therapy Daily Treatment Note     Name: Jian JASMINE [facility]  Clinic Number: [id number]    Therapy Diagnosis:   Encounter Diagnoses   Name Primary?    Decreased strength     Impaired functional mobility, balance, gait, and endurance      Physician: Liliane Churchill MD    Visit Date: 10/28/2019    Physician Orders: PT Eval and Treat   Medical Diagnosis from Referral: Decreased mobility  Evaluation Date: 2019  Authorization Period Expiration: 19  Updated Plan of Care Expiration: 19  Visit # / Visits authorized: 15 ( newly approved visits)     Time In: 8:10 AM  (pt with late arrival)  Time Out: 9:00 AM   Total Billable Time: 12 minutes      Precautions: Standard, Diabetes, Fall and Cardiac, BLE lymphedema    Subjective     Pt reports: he's in more pain today, as he had a garage sale over the weekend and did a lot of bending.  He was somewhat compliant with home exercise program.     Response to previous treatment: no adverse reaction  Functional change: able to get up and down from the floor.    Pain: 3-4/10    Location: not specified    Objective   Pt received amb independently.      Jian received therapeutic exercises to develop strength, endurance, ROM and core stabilization for 35 minutes includin:1 for 12 min    Machines  · Precor Leg press: 20#, but deeper squat, 2  X 10 reps -not performed today  · Precor leg press: 80#, 3  x 10 reps  · Recumbent bike, 8 min, Level 2    Sitting    · Seated marching, 2 x 10 reps/LE   · Seated kicks, 2 x 10 reps,/LE  · Sit <> stand, 2 x 10 reps, no hands, 10# kettlebell upside down  · Hip ADD squeeze with Pilates ring, 2 x 10    Supine    · Hamstring stretch seated, 2 x 30s/LE -not performed today  · Bridges, 2 x 10 reps, 3s holds  · UE pull down in hook-lying,  x 10 reps, pink sports cord  · Clamshell, 2 x 10/LE -not performed today  ·     Not performed today:  Standing  · Step- up on 8" step, 20/LE, UE Support on Precor bar  · Lateral step-up on 8" " step, 10/LE  · Standing hip Ext, 2 x 10/LE  · Hip flexion standing, 2 x 10, on airex  · Hip ABD standing, 2 x10, on airex          Home Exercises Provided and Patient Education Provided     Education provided:     - Cues with exercises, that is is not appropriate to use jacuzzi because of hi LE lympedema    Written Home Exercises Provided: Continue prior HEP  Exercises were reviewed and Jian was able to demonstrate them prior to the end of the session.  Jian demonstrated good  understanding of the education provided.     See EMR under Patient Instructions for exercises provided 8/20/2019.    Assessment   Pt tolerated session fairly well today. Session limited due to late arrival and increased time in the restroom. Pt also with increased fatigue/pain due to increased activity over the weekend. pt will benefit from continued PT to work towards remaining/updated goals. Will progress as tolerated with emphasis on strength for getting up from low surfaces.      Jian is progressing well towards his goals.   Pt prognosis is Good.     Pt will continue to benefit from skilled outpatient physical therapy to address the deficits listed in the problem list box on initial evaluation, provide pt/family education and to maximize pt's level of independence in the home and community environment.   Pt's spiritual, cultural and educational needs considered and pt agreeable to plan of care and goals.    Anticipated barriers to physical therapy: co-morbidities    Goals:  Short Term Goals: 4 weeks   1. Pt will tolerate HEP for improved strength, functional mobility, ROM, posture, and endurance. (progressing, not met)  2. Pt will demo >/= 4/5 strength in BLE's for improved functional mobility, endurance, and posture. (met, except for hip Ext)  3. Pt will reduce TUG (timed up and go) time to </= 17 sec for improved safety with community ambulation and decreased falls risk. (exceeded 9/10)  4. Pt will perform 9 sit <> stands with use of  armrests in 30s for improved endurance. (exceeded, 9/10)  5. Pt will maintain balance in MCTSIB (Modified Clinical Test of Sensory Interaction on Balance) scenario 3 for >/=30s for improved balance/decreased falls risk. (met, 9/10)  6. Pt will report feeling more steady/confident on his feet when walking at home and in the community for improved functional mobility. (Met, 10/3)     Long Term Goals: 8 weeks   1. Pt will be I with updated HEP for improved functional mobility, posture, strength, and endurance. (progressing, not met)  2. Pt will demo 5/5 strength in BLE's for improved functional mobility, endurance, and posture. (progressing, not met)  3. Pt will reduce TUG (timed up and go) time to </= 11 sec for improved safety with community ambulation and decreased falls risk. (Met, 10/3  4. Pt will perform 11 sit <> stands with use of armrests in 30s for improved endurance. (Exceeded, 10/3)  5. Pt will maintain balance in MCTSIB (Modified Clinical Test of Sensory Interaction on Balance) scenario 4 for >/=30s for improved balance/decreased falls risk. (progressing, not met)  6. Pt will report/demo negotiating 1 flight of stairs with Mod I for improved functional mobility at home and in the community. (Met, 10/3)    New goal:  7. Pt will report improved ability to stand from low surface and/or kneeling position for improved functional mobility. (Met, 10/24)    Plan     Continue POC. Progress as tolerated.    Dora Mendez, PT

## 2019-11-05 ENCOUNTER — CLINICAL SUPPORT (OUTPATIENT)
Dept: REHABILITATION | Facility: HOSPITAL | Age: 64
End: 2019-11-05
Payer: MEDICARE

## 2019-11-05 DIAGNOSIS — Z74.09 IMPAIRED FUNCTIONAL MOBILITY, BALANCE, GAIT, AND ENDURANCE: ICD-10-CM

## 2019-11-05 DIAGNOSIS — R53.1 DECREASED STRENGTH: ICD-10-CM

## 2019-11-05 PROCEDURE — 97110 THERAPEUTIC EXERCISES: CPT | Mod: PO

## 2019-11-05 PROCEDURE — 97750 PHYSICAL PERFORMANCE TEST: CPT | Mod: PO

## 2019-11-05 NOTE — PROGRESS NOTES
Physical Therapy Daily Treatment Note     Name: Jian Arrieta  [facility] Number: [id number]    Therapy Diagnosis:   Encounter Diagnoses   Name Primary?    Decreased strength     Impaired functional mobility, balance, gait, and endurance      Physician: Liliane Churchill MD    Visit Date: 2019    Physician Orders: PT Eval and Treat   Medical Diagnosis from Referral: Decreased mobility  Evaluation Date: 2019  Authorization Period Expiration: 19  Updated Plan of Care Expiration: 19  Visit # / Visits authorized: 16 (3/12 newly approved visits)     Time In: 14:08  PM  (pt with late arrival)  Time Out: 15:00  Total Billable Time: 25 minutes      Precautions: Standard, Diabetes, Fall and Cardiac, BLE lymphedema    Subjective     Pt reports: he's in more pain today, as he had another garage sale over the weekend and did a lot of bending.  He was somewhat compliant with home exercise program.     Response to previous treatment: no adverse reaction  Functional change:no change. Pt states it is very difficult to lift bottle of [place] (40#)    Pain: 610    Location: B back    Objective   Pt received amb independently, + BLE lymphedema. Pt not wearing bandages today    Pt participates in physical performance testing with report x 8 min. Please refer to POC objective section for details.  1:1 for 8 min      Jian received therapeutic exercises to develop strength, endurance, ROM and core stabilization for 44 minutes includin:1 for 17 min    Machines  · Precor Leg press: 20#, but deeper squat, 2  X 10 reps   · Precor leg press: 80#, 2 x 10 reps  · Recumbent bike, 8 min, Level 2 -not performed today (NP)    Sitting    · Seated marching, 2 x 10 reps/LE with assist of towel  · Seated kicks, 2 x 10 reps,/LE  · Back stretch with large physioball, 10-15 reps  · Sit <> stand, 2 x 10 reps, no hands, 10# kettlebell upside down - NP  · Hip ADD squeeze with Pilates ring, 2 x 10    Supine    · Hamstring stretch  seated, 2 x 30s/LE -not performed today  · Bridges, 2 x 10 reps, 3s holds  · LTR, 15 reps B      Standing    · Heel raises 2 x 10 on airex  · Hip flexion standing, 2 x 10, on airex  · Hip ABD standing, 2 x10, on airex          Home Exercises Provided and Patient Education Provided     Education provided:     - Cues with exercises, that is is not appropriate to use katerina because of his LE lymphedema, progress to date    Written Home Exercises Provided: Continue prior HEP  Exercises were reviewed and Jian was able to demonstrate them prior to the end of the session.  Jian demonstrated good  understanding of the education provided.     See EMR under Patient Instructions for exercises provided 8/20/2019.    Assessment   Pt tolerated session fairly well today. Session limited due to late arrival. Pt with increased LE strength overall and improved balance. Pt's TUG and sit <> stand scores are slightly worse today, but this is likely due to increased pain and fatigue from working garage sale over the weekend. Pt has met goals as noted below. Will benefit from continued PT to address remaining deficits/goals. Will progress as tolerated with emphasis on strength for getting up from low surfaces.      Jian is progressing well towards his goals.   Pt prognosis is Good.     Pt will continue to benefit from skilled outpatient physical therapy to address the deficits listed in the problem list box on initial evaluation, provide pt/family education and to maximize pt's level of independence in the home and community environment.   Pt's spiritual, cultural and educational needs considered and pt agreeable to plan of care and goals.    Anticipated barriers to physical therapy: co-morbidities    Goals:  Short Term Goals: 4 weeks   1. Pt will tolerate HEP for improved strength, functional mobility, ROM, posture, and endurance. (Met, 11/5)  2. Pt will demo >/= 4/5 strength in BLE's for improved functional mobility, endurance, and  posture. (met except for hip extension)  3. Pt will reduce TUG (timed up and go) time to </= 17 sec for improved safety with community ambulation and decreased falls risk. (exceeded 9/10)  4. Pt will perform 9 sit <> stands with use of armrests in 30s for improved endurance. (exceeded, 9/10)  5. Pt will maintain balance in MCTSIB (Modified Clinical Test of Sensory Interaction on Balance) scenario 3 for >/=30s for improved balance/decreased falls risk. (met, 9/10)  6. Pt will report feeling more steady/confident on his feet when walking at home and in the community for improved functional mobility. (Met, 10/3)     Long Term Goals: 8 weeks   1. Pt will be I with updated HEP for improved functional mobility, posture, strength, and endurance. (progressing, not met)  2. Pt will demo 5/5 strength in BLE's for improved functional mobility, endurance, and posture. (progressing, not met)  3. Pt will reduce TUG (timed up and go) time to </= 11 sec for improved safety with community ambulation and decreased falls risk. (Met, 10/3  4. Pt will perform 11 sit <> stands with use of armrests in 30s for improved endurance. (Exceeded, 10/3)  5. Pt will maintain balance in MCTSIB (Modified Clinical Test of Sensory Interaction on Balance) scenario 4 for >/=30s for improved balance/decreased falls risk. (Met 11/5  6. Pt will report/demo negotiating 1 flight of stairs with Mod I for improved functional mobility at home and in the community. (Met, 10/3)    New goal:  7. Pt will report improved ability to stand from low surface and/or kneeling position for improved functional mobility. (Met, 10/24)    Plan     Continue POC. Progress as tolerated.    Dora Mendez, PT

## 2019-11-05 NOTE — PLAN OF CARE
Outpatient Therapy Updated Plan of Care     Visit Date: 11/5/2019  Name: Jian Arrieta  Clinic Number: [id number]    Therapy Diagnosis:   Encounter Diagnoses   Name Primary?    Decreased strength     Impaired functional mobility, balance, gait, and endurance      Physician: Liliane Churchill MD    Physician Orders: PT Eval and Treat   Medical Diagnosis from Referral: Decreased mobility  Evaluation Date: 8/9/2019    Total Visits Received: 16  Cancelled Visits: unknown  No Show Visits: 0    Current Certification Period:  8/9/19 to 10/4/19  Precautions: Standard, Diabetes, Fall and Cardiac, BLE lymphedema  Visits from Evaluation Date:  15      Subjective     Update: he's in more pain today, as he had another garage sale over the weekend and did a lot of bending.  He was somewhat compliant with home exercise program.     6/10 pain in B back    Objective     Update: Lower Extremity Strength (Seated)  Right LE   Left LE     Knee extension: 5/5 Knee extension: 5/5   Knee flexion: 5/5 Knee flexion: 5/5   Hip flexion: 4+/5 Hip flexion: 5/5   Hip extension:  >/=3+/5 Hip extension: >/=4-/5   Hip abduction: 5/5 Hip abduction: 5/5   Hip adduction: 5/5 Hip adduction 5/5   Ankle dorsiflexion: 5/5 Ankle dorsiflexion: 5/5   Ankle plantarflexion: 5/5 ( in available range) Ankle plantarflexion: 5/5            Special Tests:  - Sit <> stands in 30s (from gold chair): 12 reps with arms on thighs  -TUG from gold chair, 13s, no device     MCTSIB (Modified Clinical Test of Sensory Interaction on Balance):  1. Eyes Open/feet together/Firm: 30 seconds  2. Eyes Closed/feet together/Firm: 30 seconds  3. Eyes Open/feet together/Foam: 30 seconds  4. Eyes Closed/feet together/Foam: 30 seconds      Assessment     Update: Pt tolerated session fairly well today. Session limited due to late arrival. Pt with increased LE strength overall and improved balance. Pt's TUG and sit <> stand scores are slightly worse today, but this is likely due to  increased pain and fatigue from working garage sale over the weekend. Pt has met goals as noted below. Will benefit from continued PT to address remaining deficits/goals. Will progress as tolerated with emphasis on strength for getting up from low surfaces.     Previous Short Term Goals Status:   Met partially  New Short Term Goals Status:   N/A  Long Term Goal Status:   continue per prior plan of care    Reasons for Recertification of Therapy:   Pt making steady progress and will benefit from continued therapy to address remaining deficits    Plan     Updated Certification Period: 11/5/2019 to 11/22/19  Recommended Treatment Plan: 2 times per week for 3 weeks: Gait Training, Moist Heat/ Ice, Neuromuscular Re-ed, Orthotic Management and Training, Patient Education, Self Care, Therapeutic Activites, Therapeutic Exercise and modalities as appropriate  Other Recommendations: none    Dora Mendez, PT  11/5/2019      I CERTIFY THE NEED FOR THESE SERVICES FURNISHED UNDER THIS PLAN OF TREATMENT AND WHILE UNDER MY CARE    Physician's comments:        Physician's Signature: ___________________________________________________

## 2019-11-08 ENCOUNTER — CLINICAL SUPPORT (OUTPATIENT)
Dept: REHABILITATION | Facility: HOSPITAL | Age: 64
End: 2019-11-08
Payer: MEDICARE

## 2019-11-08 DIAGNOSIS — Z74.09 IMPAIRED FUNCTIONAL MOBILITY, BALANCE, GAIT, AND ENDURANCE: ICD-10-CM

## 2019-11-08 DIAGNOSIS — R53.1 DECREASED STRENGTH: ICD-10-CM

## 2019-11-08 PROCEDURE — 97110 THERAPEUTIC EXERCISES: CPT | Mod: PO

## 2019-11-08 NOTE — PROGRESS NOTES
"  Physical Therapy Daily Treatment Note     Name: Jian Malcolmkley  Clinic Number: [id number]    Therapy Diagnosis:   Encounter Diagnoses   Name Primary?    Decreased strength     Impaired functional mobility, balance, gait, and endurance      Physician: Liliane Churchill MD    Visit Date: 2019    Physician Orders: PT Eval and Treat   Medical Diagnosis from Referral: Decreased mobility  Evaluation Date: 2019  Authorization Period Expiration: 19  Updated Plan of Care Expiration: 19  Visit # / Visits authorized: 17 ( newly approved visits)     Time In: 8:05 AM  Time Out: 9:00 AM  Total Billable Time: 15 minutes      Precautions: Standard, Diabetes, Fall and Cardiac, BLE lymphedema    Subjective     Pt reports: "8am is too early." I'm not having enough pain to worry about.  He was somewhat compliant with home exercise program.     Response to previous treatment: no adverse reaction  Functional change:no change. Pt states it is very difficult to lift bottle of [place] (40#)    Pain: not rated  Location: B back    Objective   Pt received amb independently, + BLE lymphedema. Pt not wearing bandages today        Jian received therapeutic exercises to develop strength, endurance, ROM and core stabilization for 47 minutes includin:1 for 15 min    Machines  · Precor Leg press: 20#, but deeper squat, 2  X 10 reps   · Precor leg press: 80#, 3 x 10 reps  · Recumbent bike, 8 min, Level 2     Supine  · LTR, 10 reps B  · Hip Add squeeze, 2 x 10, 5s holds  · UE pull down with pink sports cord, 2 x 10    Sitting    · Sit <> stand, 2 x 10 reps, no hands, 10# kettlebell upside down       Standing    · Heel raises 2 x 10 on airex  · Hip flexion standing, 2 x 10, on airex  · Hip ABD standing, 2 x10, on airex  · Hip Ext on airex, 2 x 10  · Lateral step-up, 6' step,  x 10/side  · Forward step-up, 6: step, 10/side          Home Exercises Provided and Patient Education Provided     Education provided:     - Cues " with exercises, that is is not appropriate to use katerina because of his LE lymphedema    Written Home Exercises Provided: Continue prior HEP  Exercises were reviewed and Jian was able to demonstrate them prior to the end of the session.  Jian demonstrated good  understanding of the education provided.     See EMR under Patient Instructions for exercises provided 8/20/2019.    Assessment   Pt tolerated session well today. Pt able to tolerate increased standing exercises. Will progress as tolerated with emphasis on strength for getting up from low surfaces.      Jian is progressing well towards his goals.   Pt prognosis is Good.     Pt will continue to benefit from skilled outpatient physical therapy to address the deficits listed in the problem list box on initial evaluation, provide pt/family education and to maximize pt's level of independence in the home and community environment.   Pt's spiritual, cultural and educational needs considered and pt agreeable to plan of care and goals.    Anticipated barriers to physical therapy: co-morbidities    Goals:  Short Term Goals: 4 weeks   1. Pt will tolerate HEP for improved strength, functional mobility, ROM, posture, and endurance. (Met, 11/5)  2. Pt will demo >/= 4/5 strength in BLE's for improved functional mobility, endurance, and posture. (met except for hip extension)  3. Pt will reduce TUG (timed up and go) time to </= 17 sec for improved safety with community ambulation and decreased falls risk. (exceeded 9/10)  4. Pt will perform 9 sit <> stands with use of armrests in 30s for improved endurance. (exceeded, 9/10)  5. Pt will maintain balance in MCTSIB (Modified Clinical Test of Sensory Interaction on Balance) scenario 3 for >/=30s for improved balance/decreased falls risk. (met, 9/10)  6. Pt will report feeling more steady/confident on his feet when walking at home and in the community for improved functional mobility. (Met, 10/3)     Long Term Goals: 8 weeks    1. Pt will be I with updated HEP for improved functional mobility, posture, strength, and endurance. (progressing, not met)  2. Pt will demo 5/5 strength in BLE's for improved functional mobility, endurance, and posture. (progressing, not met)  3. Pt will reduce TUG (timed up and go) time to </= 11 sec for improved safety with community ambulation and decreased falls risk. (Met, 10/3  4. Pt will perform 11 sit <> stands with use of armrests in 30s for improved endurance. (Exceeded, 10/3)  5. Pt will maintain balance in MCTSIB (Modified Clinical Test of Sensory Interaction on Balance) scenario 4 for >/=30s for improved balance/decreased falls risk. (Met 11/5  6. Pt will report/demo negotiating 1 flight of stairs with Mod I for improved functional mobility at home and in the community. (Met, 10/3)    New goal:  7. Pt will report improved ability to stand from low surface and/or kneeling position for improved functional mobility. (Met, 10/24)    Plan     Continue POC. Progress as tolerated.    Dora Mendez, PT

## 2019-11-11 ENCOUNTER — CLINICAL SUPPORT (OUTPATIENT)
Dept: REHABILITATION | Facility: HOSPITAL | Age: 64
End: 2019-11-11
Payer: MEDICARE

## 2019-11-11 DIAGNOSIS — Z74.09 IMPAIRED FUNCTIONAL MOBILITY, BALANCE, GAIT, AND ENDURANCE: ICD-10-CM

## 2019-11-11 DIAGNOSIS — R53.1 DECREASED STRENGTH: ICD-10-CM

## 2019-11-11 PROCEDURE — 97110 THERAPEUTIC EXERCISES: CPT | Mod: PO

## 2019-11-11 NOTE — PROGRESS NOTES
Physical Therapy Daily Treatment Note     Name: Jian JASMINE Meenakshi  [facility] Number: [id number]    Therapy Diagnosis:   Encounter Diagnoses   Name Primary?    Decreased strength     Impaired functional mobility, balance, gait, and endurance      Physician: Liliane Churchill MD    Visit Date: 2019    Physician Orders: PT Eval and Treat   Medical Diagnosis from Referral: Decreased mobility  Evaluation Date: 2019  Authorization Period Expiration: 19  Updated Plan of Care Expiration: 19  Visit # / Visits authorized: 18 ( newly approved visits)     Time In: 8:05 AM  Time Out: 9:05 AM  Total Billable Time: 18 minutes      Precautions: Standard, Diabetes, Fall and Cardiac, BLE lymphedema    Subjective     Pt reports: No new complaints.   He was somewhat compliant with home exercise program.     Response to previous treatment: no adverse reaction  Functional change:none reported    Pain: not rated  Location: B back    Objective   Pt received amb independently, + BLE lymphedema. Pt not wearing bandages today        Jian received therapeutic exercises to develop strength, endurance, ROM and core stabilization for 52minutes includin:1 for 18 min    Machines  · Precor Leg press: 40#, but deeper squat, 2  X 10 reps   · Precor leg press: 80#, 2 x 10 reps  · Recumbent bike, 8 min, Level 2     Supine  · LTR, 10 reps B  · Hip Add squeeze, 2 x 10, 5s holds  · UE pull down with pink sports cord, 2 x 10    Sitting    · Sit <> stand, 2 x 10 reps, no hands, 10# kettlebell upside down, cues to perform slowly  · Hip ADD squeeze, 2 x 10, pilates ring  · Marching, 2 x 10  · Long arc quads, 2 x 10/LE      Standing      · Heel raises 2 x 10   · Hip flexion standing, 2 x 10  · Hip ABD standing, 2 x10,  · Hip Ext , 2 x 10  · (All above, 1 set performed on level surface and 1 set on airex)    · Farmer's carry, 10#, 2 laps with weight in each hand    Not performed today:  · Lateral step-up, 6' step,  x 10/side  · Forward  step-up, 6: step, 10/side          Home Exercises Provided and Patient Education Provided     Education provided:     - Cues with exercises    Written Home Exercises Provided: Continue prior HEP  Exercises were reviewed and Jian was able to demonstrate them prior to the end of the session.  Jian demonstrated good  understanding of the education provided.     See EMR under Patient Instructions for exercises provided 8/20/2019.    Assessment   Pt tolerated session well today and progressed resistance on leg press. Pt able to tolerate increased standing exercises as well as farmer's carry. Pt needs occasional seated rest breaks. Will progress as tolerated with emphasis on strength for getting up from low surfaces.      Jian is progressing well towards his goals.   Pt prognosis is Good.     Pt will continue to benefit from skilled outpatient physical therapy to address the deficits listed in the problem list box on initial evaluation, provide pt/family education and to maximize pt's level of independence in the home and community environment.   Pt's spiritual, cultural and educational needs considered and pt agreeable to plan of care and goals.    Anticipated barriers to physical therapy: co-morbidities    Goals:  Short Term Goals: 4 weeks   1. Pt will tolerate HEP for improved strength, functional mobility, ROM, posture, and endurance. (Met, 11/5)  2. Pt will demo >/= 4/5 strength in BLE's for improved functional mobility, endurance, and posture. (met except for hip extension)  3. Pt will reduce TUG (timed up and go) time to </= 17 sec for improved safety with community ambulation and decreased falls risk. (exceeded 9/10)  4. Pt will perform 9 sit <> stands with use of armrests in 30s for improved endurance. (exceeded, 9/10)  5. Pt will maintain balance in MCTSIB (Modified Clinical Test of Sensory Interaction on Balance) scenario 3 for >/=30s for improved balance/decreased falls risk. (met, 9/10)  6. Pt will report  feeling more steady/confident on his feet when walking at home and in the community for improved functional mobility. (Met, 10/3)     Long Term Goals: 8 weeks   1. Pt will be I with updated HEP for improved functional mobility, posture, strength, and endurance. (progressing, not met)  2. Pt will demo 5/5 strength in BLE's for improved functional mobility, endurance, and posture. (progressing, not met)  3. Pt will reduce TUG (timed up and go) time to </= 11 sec for improved safety with community ambulation and decreased falls risk. (Met, 10/3  4. Pt will perform 11 sit <> stands with use of armrests in 30s for improved endurance. (Exceeded, 10/3)  5. Pt will maintain balance in MCTSIB (Modified Clinical Test of Sensory Interaction on Balance) scenario 4 for >/=30s for improved balance/decreased falls risk. (Met 11/5  6. Pt will report/demo negotiating 1 flight of stairs with Mod I for improved functional mobility at home and in the community. (Met, 10/3)    New goal:  7. Pt will report improved ability to stand from low surface and/or kneeling position for improved functional mobility. (Met, 10/24)    Plan     Continue POC. Progress as tolerated.    Dora Mendez, PT

## 2019-11-14 ENCOUNTER — CLINICAL SUPPORT (OUTPATIENT)
Dept: REHABILITATION | Facility: HOSPITAL | Age: 64
End: 2019-11-14
Payer: MEDICARE

## 2019-11-14 DIAGNOSIS — Z74.09 IMPAIRED FUNCTIONAL MOBILITY, BALANCE, GAIT, AND ENDURANCE: ICD-10-CM

## 2019-11-14 DIAGNOSIS — R53.1 DECREASED STRENGTH: ICD-10-CM

## 2019-11-14 PROCEDURE — 97110 THERAPEUTIC EXERCISES: CPT | Mod: PO

## 2019-11-14 NOTE — PROGRESS NOTES
Physical Therapy Daily Treatment Note     Name: Jian Arrieta  [facility] Number: [id number]    Therapy Diagnosis:   Encounter Diagnoses   Name Primary?    Decreased strength     Impaired functional mobility, balance, gait, and endurance      Physician: Liliane Churchill MD    Visit Date: 2019    Physician Orders: PT Eval and Treat   Medical Diagnosis from Referral: Decreased mobility  Evaluation Date: 2019  Authorization Period Expiration: 19  Updated Plan of Care Expiration: 19  Visit # / Visits authorized: 19 ( newly approved visits)     Time In: 10:05AM  Time Out: 11:02 AM  Total Billable Time: 20 minutes      Precautions: Standard, Diabetes, Fall and Cardiac, BLE lymphedema    Subjective     Pt reports: No new complaints. Pt reports some fatigue  He was somewhat compliant with home exercise program.     Response to previous treatment: no adverse reaction  Functional change:none reported    Pain: not rated  Location: B back    Objective   Pt received amb independently, + BLE lymphedema. Pt not wearing bandages today  Jian received therapeutic exercises to develop strength, endurance, ROM and core stabilization for 47minutes includin:1 for 20min    Machines  · Precor Leg press: 40#, but deeper squat, 2  X 20 reps   · Precor leg press: 90#, 2 x 20 reps  · Recumbent bike,  10min, Level 3    Sitting    · Sit <> stand, 2 x 10 reps, no hands, 10# kettlebell upside down, cues to perform slowly  · Hip ADD squeeze, 2 x 10, pilates ring --not performed today  · Marching, 2 x 10  · Long arc quads, 2 x 10/LE    Standing    · Heel raises 2 x 10   · TRX squats, 2 x 10  · Lateral step-up, 6' step,  2x 10/side  · Hip flexion on hip rotary, 25#, 10/side      Not performed today:  · Hip flexion standing, 2 x 10  · Hip ABD standing, 2 x10,  · Hip Ext , 2 x 10  · Farmer's carry, 10#, 2 laps with weight in each hand  Supine  · LTR, 10 reps B  · Hip Add squeeze, 2 x 10, 5s holds  · UE pull down with pink  sports cord, 2 x 10  · Forward step-up, 6: step, 10/side          Home Exercises Provided and Patient Education Provided     Education provided:     - Cues with exercises, potential benefit of medical fitness referral    Written Home Exercises Provided: Continue prior HEP  Exercises were reviewed and Jian was able to demonstrate them prior to the end of the session.  Jian demonstrated good  understanding of the education provided.     See EMR under Patient Instructions for exercises provided 8/20/2019.    Assessment   Pt tolerated session well today and progressed resistance on leg press, and tolerated addition of TRX squats and Hip flexion on Matrix machine.  Pt needs reduced seated rest breaks. Will progress as tolerated and consider medical fitness referral as pt likely to be discharged at next session. Pt provided with information on medical fitness.     Jian is progressing well towards his goals.   Pt prognosis is Good.     Pt will continue to benefit from skilled outpatient physical therapy to address the deficits listed in the problem list box on initial evaluation, provide pt/family education and to maximize pt's level of independence in the home and community environment.   Pt's spiritual, cultural and educational needs considered and pt agreeable to plan of care and goals.    Anticipated barriers to physical therapy: co-morbidities    Goals:  Short Term Goals: 4 weeks   1. Pt will tolerate HEP for improved strength, functional mobility, ROM, posture, and endurance. (Met, 11/5)  2. Pt will demo >/= 4/5 strength in BLE's for improved functional mobility, endurance, and posture. (met except for hip extension)  3. Pt will reduce TUG (timed up and go) time to </= 17 sec for improved safety with community ambulation and decreased falls risk. (exceeded 9/10)  4. Pt will perform 9 sit <> stands with use of armrests in 30s for improved endurance. (exceeded, 9/10)  5. Pt will maintain balance in MCTSIB (Modified  Clinical Test of Sensory Interaction on Balance) scenario 3 for >/=30s for improved balance/decreased falls risk. (met, 9/10)  6. Pt will report feeling more steady/confident on his feet when walking at home and in the community for improved functional mobility. (Met, 10/3)     Long Term Goals: 8 weeks   1. Pt will be I with updated HEP for improved functional mobility, posture, strength, and endurance. (progressing, not met)  2. Pt will demo 5/5 strength in BLE's for improved functional mobility, endurance, and posture. (progressing, not met)  3. Pt will reduce TUG (timed up and go) time to </= 11 sec for improved safety with community ambulation and decreased falls risk. (Met, 10/3  4. Pt will perform 11 sit <> stands with use of armrests in 30s for improved endurance. (Exceeded, 10/3)  5. Pt will maintain balance in MCTSIB (Modified Clinical Test of Sensory Interaction on Balance) scenario 4 for >/=30s for improved balance/decreased falls risk. (Met 11/5  6. Pt will report/demo negotiating 1 flight of stairs with Mod I for improved functional mobility at home and in the community. (Met, 10/3)    New goal:  7. Pt will report improved ability to stand from low surface and/or kneeling position for improved functional mobility. (Met, 10/24)    Plan     Continue POC. Progress as tolerated.    Dora Mendez, PT

## 2019-11-15 ENCOUNTER — OFFICE VISIT (OUTPATIENT)
Dept: INTERNAL MEDICINE | Facility: CLINIC | Age: 64
End: 2019-11-15
Payer: MEDICARE

## 2019-11-15 ENCOUNTER — LAB VISIT (OUTPATIENT)
Dept: LAB | Facility: HOSPITAL | Age: 64
End: 2019-11-15
Attending: INTERNAL MEDICINE
Payer: MEDICARE

## 2019-11-15 VITALS
HEART RATE: 80 BPM | HEIGHT: 67 IN | WEIGHT: 267.19 LBS | SYSTOLIC BLOOD PRESSURE: 96 MMHG | TEMPERATURE: 98 F | BODY MASS INDEX: 41.94 KG/M2 | DIASTOLIC BLOOD PRESSURE: 70 MMHG | RESPIRATION RATE: 18 BRPM

## 2019-11-15 DIAGNOSIS — I89.0 LYMPHEDEMA OF BOTH LOWER EXTREMITIES: ICD-10-CM

## 2019-11-15 DIAGNOSIS — R53.81 PHYSICAL DECONDITIONING: ICD-10-CM

## 2019-11-15 DIAGNOSIS — E78.5 HYPERLIPIDEMIA ASSOCIATED WITH TYPE 2 DIABETES MELLITUS: ICD-10-CM

## 2019-11-15 DIAGNOSIS — I25.84 CORONARY ARTERY DISEASE DUE TO CALCIFIED CORONARY LESION: ICD-10-CM

## 2019-11-15 DIAGNOSIS — R18.8 OTHER ASCITES: ICD-10-CM

## 2019-11-15 DIAGNOSIS — K76.6 PORTAL HYPERTENSION DUE TO OBSTRUCTION OF EXTRAHEPATIC PORTAL VEIN: Primary | ICD-10-CM

## 2019-11-15 DIAGNOSIS — I81 PORTAL HYPERTENSION DUE TO OBSTRUCTION OF EXTRAHEPATIC PORTAL VEIN: ICD-10-CM

## 2019-11-15 DIAGNOSIS — E11.22 TYPE 2 DIABETES MELLITUS WITH STAGE 4 CHRONIC KIDNEY DISEASE, WITHOUT LONG-TERM CURRENT USE OF INSULIN: ICD-10-CM

## 2019-11-15 DIAGNOSIS — E66.01 OBESITY, CLASS III, BMI 40-49.9 (MORBID OBESITY): ICD-10-CM

## 2019-11-15 DIAGNOSIS — I81 PORTAL HYPERTENSION DUE TO OBSTRUCTION OF EXTRAHEPATIC PORTAL VEIN: Primary | ICD-10-CM

## 2019-11-15 DIAGNOSIS — I15.2 HYPERTENSION ASSOCIATED WITH DIABETES: ICD-10-CM

## 2019-11-15 DIAGNOSIS — D64.9 ANEMIA, UNSPECIFIED TYPE: ICD-10-CM

## 2019-11-15 DIAGNOSIS — I70.90 ATHEROSCLEROSIS: ICD-10-CM

## 2019-11-15 DIAGNOSIS — I25.10 CORONARY ARTERY DISEASE DUE TO CALCIFIED CORONARY LESION: ICD-10-CM

## 2019-11-15 DIAGNOSIS — N18.4 TYPE 2 DIABETES MELLITUS WITH STAGE 4 CHRONIC KIDNEY DISEASE, WITHOUT LONG-TERM CURRENT USE OF INSULIN: ICD-10-CM

## 2019-11-15 DIAGNOSIS — N18.4 CKD (CHRONIC KIDNEY DISEASE), STAGE IV: ICD-10-CM

## 2019-11-15 DIAGNOSIS — K76.6 PORTAL HYPERTENSION DUE TO OBSTRUCTION OF EXTRAHEPATIC PORTAL VEIN: ICD-10-CM

## 2019-11-15 DIAGNOSIS — E11.42 DM TYPE 2 WITH DIABETIC PERIPHERAL NEUROPATHY: ICD-10-CM

## 2019-11-15 DIAGNOSIS — E11.59 HYPERTENSION ASSOCIATED WITH DIABETES: ICD-10-CM

## 2019-11-15 DIAGNOSIS — E66.2 OBESITY HYPOVENTILATION SYNDROME: Chronic | ICD-10-CM

## 2019-11-15 DIAGNOSIS — E11.69 HYPERLIPIDEMIA ASSOCIATED WITH TYPE 2 DIABETES MELLITUS: ICD-10-CM

## 2019-11-15 DIAGNOSIS — I48.0 PAROXYSMAL ATRIAL FIBRILLATION: ICD-10-CM

## 2019-11-15 DIAGNOSIS — K86.1 IDIOPATHIC CHRONIC PANCREATITIS: ICD-10-CM

## 2019-11-15 LAB
ALBUMIN SERPL BCP-MCNC: 2.8 G/DL (ref 3.5–5.2)
ALP SERPL-CCNC: 117 U/L (ref 55–135)
ALT SERPL W/O P-5'-P-CCNC: 17 U/L (ref 10–44)
ANION GAP SERPL CALC-SCNC: 4 MMOL/L (ref 8–16)
AST SERPL-CCNC: 21 U/L (ref 10–40)
BILIRUB SERPL-MCNC: 0.4 MG/DL (ref 0.1–1)
BUN SERPL-MCNC: 13 MG/DL (ref 8–23)
CALCIUM SERPL-MCNC: 8.5 MG/DL (ref 8.7–10.5)
CHLORIDE SERPL-SCNC: 114 MMOL/L (ref 95–110)
CO2 SERPL-SCNC: 23 MMOL/L (ref 23–29)
CREAT SERPL-MCNC: 1.5 MG/DL (ref 0.5–1.4)
EST. GFR  (AFRICAN AMERICAN): 55.7 ML/MIN/1.73 M^2
EST. GFR  (NON AFRICAN AMERICAN): 48.2 ML/MIN/1.73 M^2
GLUCOSE SERPL-MCNC: 122 MG/DL (ref 70–110)
POTASSIUM SERPL-SCNC: 4.8 MMOL/L (ref 3.5–5.1)
PROT SERPL-MCNC: 5.9 G/DL (ref 6–8.4)
SODIUM SERPL-SCNC: 141 MMOL/L (ref 136–145)

## 2019-11-15 PROCEDURE — 90662 IIV NO PRSV INCREASED AG IM: CPT | Mod: S$GLB,,, | Performed by: INTERNAL MEDICINE

## 2019-11-15 PROCEDURE — 3078F PR MOST RECENT DIASTOLIC BLOOD PRESSURE < 80 MM HG: ICD-10-PCS | Mod: CPTII,S$GLB,, | Performed by: INTERNAL MEDICINE

## 2019-11-15 PROCEDURE — 99499 UNLISTED E&M SERVICE: CPT | Mod: S$GLB,,, | Performed by: INTERNAL MEDICINE

## 2019-11-15 PROCEDURE — 80053 COMPREHEN METABOLIC PANEL: CPT

## 2019-11-15 PROCEDURE — G0008 FLU VACCINE - HIGH DOSE (65+) PRESERVATIVE FREE IM: ICD-10-PCS | Mod: S$GLB,,, | Performed by: INTERNAL MEDICINE

## 2019-11-15 PROCEDURE — 99214 PR OFFICE/OUTPT VISIT, EST, LEVL IV, 30-39 MIN: ICD-10-PCS | Mod: 25,S$GLB,, | Performed by: INTERNAL MEDICINE

## 2019-11-15 PROCEDURE — 3008F BODY MASS INDEX DOCD: CPT | Mod: CPTII,S$GLB,, | Performed by: INTERNAL MEDICINE

## 2019-11-15 PROCEDURE — 3078F DIAST BP <80 MM HG: CPT | Mod: CPTII,S$GLB,, | Performed by: INTERNAL MEDICINE

## 2019-11-15 PROCEDURE — 99499 RISK ADDL DX/OHS AUDIT: ICD-10-PCS | Mod: S$GLB,,, | Performed by: INTERNAL MEDICINE

## 2019-11-15 PROCEDURE — 1101F PT FALLS ASSESS-DOCD LE1/YR: CPT | Mod: CPTII,S$GLB,, | Performed by: INTERNAL MEDICINE

## 2019-11-15 PROCEDURE — 3074F SYST BP LT 130 MM HG: CPT | Mod: CPTII,S$GLB,, | Performed by: INTERNAL MEDICINE

## 2019-11-15 PROCEDURE — 90662 FLU VACCINE - HIGH DOSE (65+) PRESERVATIVE FREE IM: ICD-10-PCS | Mod: S$GLB,,, | Performed by: INTERNAL MEDICINE

## 2019-11-15 PROCEDURE — 99999 PR PBB SHADOW E&M-EST. PATIENT-LVL III: CPT | Mod: PBBFAC,,, | Performed by: INTERNAL MEDICINE

## 2019-11-15 PROCEDURE — 85025 COMPLETE CBC W/AUTO DIFF WBC: CPT

## 2019-11-15 PROCEDURE — 3008F PR BODY MASS INDEX (BMI) DOCUMENTED: ICD-10-PCS | Mod: CPTII,S$GLB,, | Performed by: INTERNAL MEDICINE

## 2019-11-15 PROCEDURE — G0008 ADMIN INFLUENZA VIRUS VAC: HCPCS | Mod: S$GLB,,, | Performed by: INTERNAL MEDICINE

## 2019-11-15 PROCEDURE — 3074F PR MOST RECENT SYSTOLIC BLOOD PRESSURE < 130 MM HG: ICD-10-PCS | Mod: CPTII,S$GLB,, | Performed by: INTERNAL MEDICINE

## 2019-11-15 PROCEDURE — 99999 PR PBB SHADOW E&M-EST. PATIENT-LVL III: ICD-10-PCS | Mod: PBBFAC,,, | Performed by: INTERNAL MEDICINE

## 2019-11-15 PROCEDURE — 83036 HEMOGLOBIN GLYCOSYLATED A1C: CPT

## 2019-11-15 PROCEDURE — 1101F PR PT FALLS ASSESS DOC 0-1 FALLS W/OUT INJ PAST YR: ICD-10-PCS | Mod: CPTII,S$GLB,, | Performed by: INTERNAL MEDICINE

## 2019-11-15 PROCEDURE — 3046F HEMOGLOBIN A1C LEVEL >9.0%: CPT | Mod: CPTII,S$GLB,, | Performed by: INTERNAL MEDICINE

## 2019-11-15 PROCEDURE — 99214 OFFICE O/P EST MOD 30 MIN: CPT | Mod: 25,S$GLB,, | Performed by: INTERNAL MEDICINE

## 2019-11-15 PROCEDURE — 3046F PR MOST RECENT HEMOGLOBIN A1C LEVEL > 9.0%: ICD-10-PCS | Mod: CPTII,S$GLB,, | Performed by: INTERNAL MEDICINE

## 2019-11-15 PROCEDURE — 36415 COLL VENOUS BLD VENIPUNCTURE: CPT | Mod: PO

## 2019-11-15 RX ORDER — KETOCONAZOLE 20 MG/G
CREAM TOPICAL
Refills: 5 | Status: ON HOLD | COMMUNITY
Start: 2019-10-10 | End: 2020-03-02 | Stop reason: CLARIF

## 2019-11-15 RX ORDER — DOXYCYCLINE 100 MG/1
CAPSULE ORAL
Refills: 2 | Status: ON HOLD | COMMUNITY
Start: 2019-11-06 | End: 2020-03-02 | Stop reason: CLARIF

## 2019-11-15 RX ORDER — FERROUS SULFATE 325(65) MG
325 TABLET ORAL
COMMUNITY
End: 2019-11-15 | Stop reason: SDUPTHER

## 2019-11-15 RX ORDER — TRIAMCINOLONE ACETONIDE 1 MG/G
CREAM TOPICAL
Refills: 4 | Status: ON HOLD | COMMUNITY
Start: 2019-08-30 | End: 2020-03-02 | Stop reason: CLARIF

## 2019-11-15 RX ORDER — BLOOD SUGAR DIAGNOSTIC
1 STRIP MISCELLANEOUS 4 TIMES DAILY
Qty: 360 EACH | Refills: 3 | Status: SHIPPED | OUTPATIENT
Start: 2019-11-15 | End: 2022-06-14

## 2019-11-15 NOTE — PROGRESS NOTES
Subjective:       Patient ID: Jian Arrieta is a 64 y.o. male.    Chief Complaint: Follow-up (4 MONTH) and Flu Vaccine    HPI   Pt with T2DM with neuropathy, PAF, HTN, HLD, CKD IV, Atherosclerosis, Anemia, Morbid Obesity, Chronic LE lymphedema, BERHANE, Ascites/portal HTN, Chronic pancreatitis is here for 4 month f/u. Pt has been doing better overall.   Review of Systems   Constitutional: Negative for activity change, appetite change, chills, diaphoresis, fatigue, fever and unexpected weight change.   HENT: Negative for postnasal drip, rhinorrhea, sinus pressure, sneezing, sore throat, trouble swallowing and voice change.    Respiratory: Negative for cough, shortness of breath and wheezing.    Cardiovascular: Positive for leg swelling. Negative for chest pain and palpitations.   Gastrointestinal: Negative for abdominal pain, blood in stool, constipation, diarrhea, nausea and vomiting.   Genitourinary: Negative for dysuria.   Musculoskeletal: Negative for arthralgias and myalgias.   Skin: Negative for rash and wound.   Allergic/Immunologic: Negative for environmental allergies and food allergies.   Hematological: Negative for adenopathy. Does not bruise/bleed easily.       Objective:      Physical Exam   Constitutional: He is oriented to person, place, and time. He appears well-developed and well-nourished. No distress.   HENT:   Head: Normocephalic and atraumatic.   Eyes: Pupils are equal, round, and reactive to light. Conjunctivae and EOM are normal. Right eye exhibits no discharge. Left eye exhibits no discharge. No scleral icterus.   Neck: Normal range of motion. Neck supple. No JVD present.   Cardiovascular: Normal rate, regular rhythm and normal heart sounds.   No murmur heard.  Pulses:       Dorsalis pedis pulses are 2+ on the right side, and 2+ on the left side.        Posterior tibial pulses are 2+ on the right side, and 2+ on the left side.   Pulmonary/Chest: Effort normal and breath sounds normal. No  respiratory distress. He has no wheezes. He has no rales.   Abdominal: Soft. Bowel sounds are normal. There is no tenderness.   Musculoskeletal: He exhibits edema (2+ in B/L LE's).   Feet:   Right Foot:   Protective Sensation: 4 sites tested. 4 sites sensed.   Skin Integrity: Negative for ulcer, blister or skin breakdown.   Left Foot:   Skin Integrity: Negative for ulcer, blister or skin breakdown.   Lymphadenopathy:     He has no cervical adenopathy.   Neurological: He is alert and oriented to person, place, and time.   Skin: Skin is warm and dry. No rash noted. He is not diaphoretic. No pallor.       Assessment:       1. Portal hypertension due to obstruction of extrahepatic portal vein    2. Other ascites    3. DM type 2 with diabetic peripheral neuropathy    4. Type 2 diabetes mellitus with stage 4 chronic kidney disease, without long-term current use of insulin    5. Paroxysmal atrial fibrillation    6. Coronary artery disease due to calcified coronary lesion    7. Hypertension associated with diabetes    8. Hyperlipidemia associated with type 2 diabetes mellitus    9. CKD (chronic kidney disease), stage IV    10. Obesity, Class III, BMI 40-49.9 (morbid obesity)    11. Lymphedema of both lower extremities    12. Anemia, unspecified type    13. Obesity hypoventilation syndrome    14. Atherosclerosis    15. Idiopathic chronic pancreatitis    16. Physical deconditioning        Plan:    1. Ascites/portal HTN- significant improvement s/p venoplasty for portal vein occlusion        Followed by Hepatology    2. T2DM with neuropathy/CKD- last A1C of 7.1(5/19)<--7.4(3/19)<--6.9(1/19)       Increase Levemir to 20 units qHS and Novolog to 10 units TID WM 2/2 high blood sugars   3. PAF- stable, CPT   5. CAD- stable, CPT   6. HTN- stable, CPT   7. HLD- stable   8. CKD IV- stable   9. Morbid Obesity- pt advised on proper diet/exercise for weight loss  10. Chronic LE lymphedema with stasis dermatitis- stable  11. NC/NC  Anemia- stable  12. BERHANE- pt not using his CPAP  13. Aortic atherosclerosis- stable  14. Chronic pancreatitis- stable   15. Physical deconditioning- referral to Medical fitness  16. F/u in 1 wk for DM

## 2019-11-16 LAB
BASOPHILS # BLD AUTO: 0.04 K/UL (ref 0–0.2)
BASOPHILS NFR BLD: 0.7 % (ref 0–1.9)
DIFFERENTIAL METHOD: ABNORMAL
EOSINOPHIL # BLD AUTO: 0.2 K/UL (ref 0–0.5)
EOSINOPHIL NFR BLD: 3.9 % (ref 0–8)
ERYTHROCYTE [DISTWIDTH] IN BLOOD BY AUTOMATED COUNT: 13.9 % (ref 11.5–14.5)
ESTIMATED AVG GLUCOSE: 226 MG/DL (ref 68–131)
HBA1C MFR BLD HPLC: 9.5 % (ref 4–5.6)
HCT VFR BLD AUTO: 34.9 % (ref 40–54)
HGB BLD-MCNC: 10.7 G/DL (ref 14–18)
IMM GRANULOCYTES # BLD AUTO: 0.02 K/UL (ref 0–0.04)
IMM GRANULOCYTES NFR BLD AUTO: 0.4 % (ref 0–0.5)
LYMPHOCYTES # BLD AUTO: 1.8 K/UL (ref 1–4.8)
LYMPHOCYTES NFR BLD: 33 % (ref 18–48)
MCH RBC QN AUTO: 30.3 PG (ref 27–31)
MCHC RBC AUTO-ENTMCNC: 30.7 G/DL (ref 32–36)
MCV RBC AUTO: 99 FL (ref 82–98)
MONOCYTES # BLD AUTO: 0.6 K/UL (ref 0.3–1)
MONOCYTES NFR BLD: 10.4 % (ref 4–15)
NEUTROPHILS # BLD AUTO: 2.8 K/UL (ref 1.8–7.7)
NEUTROPHILS NFR BLD: 51.6 % (ref 38–73)
NRBC BLD-RTO: 0 /100 WBC
PLATELET # BLD AUTO: 174 K/UL (ref 150–350)
PMV BLD AUTO: 12 FL (ref 9.2–12.9)
RBC # BLD AUTO: 3.53 M/UL (ref 4.6–6.2)
WBC # BLD AUTO: 5.4 K/UL (ref 3.9–12.7)

## 2019-11-18 ENCOUNTER — CLINICAL SUPPORT (OUTPATIENT)
Dept: REHABILITATION | Facility: HOSPITAL | Age: 64
End: 2019-11-18
Payer: MEDICARE

## 2019-11-18 DIAGNOSIS — Z74.09 IMPAIRED FUNCTIONAL MOBILITY, BALANCE, GAIT, AND ENDURANCE: ICD-10-CM

## 2019-11-18 DIAGNOSIS — R53.1 DECREASED STRENGTH: ICD-10-CM

## 2019-11-18 PROCEDURE — G8979 MOBILITY GOAL STATUS: HCPCS | Mod: CK,PO

## 2019-11-18 PROCEDURE — 97110 THERAPEUTIC EXERCISES: CPT | Mod: PO

## 2019-11-18 PROCEDURE — G8980 MOBILITY D/C STATUS: HCPCS | Mod: CK,PO

## 2019-11-18 NOTE — PROGRESS NOTES
"  Physical Therapy Daily Treatment Note     Name: Jian Arrieta  Clinic Number: [id number]    Therapy Diagnosis:   Encounter Diagnoses   Name Primary?    Decreased strength     Impaired functional mobility, balance, gait, and endurance      Physician: Liliane Churchill MD    Visit Date: 2019    Physician Orders: PT Eval and Treat   Medical Diagnosis from Referral: Decreased mobility  Evaluation Date: 2019  Authorization Period Expiration: 19  Updated Plan of Care Expiration: 19  Visit # / Visits authorized: 20 ( newly approved visits)     Time In: 8:02AM  Time Out: 9:00 AM  Total Billable Time: 20 minutes      Precautions: Standard, Diabetes, Fall and Cardiac, BLE lymphedema    Subjective     Pt reports: pt had to fix a pipe in the bathroom over the weekend and had to getup and down from the floor two times. "Do I get a gold star?"  He was somewhat compliant with home exercise program.     Response to previous treatment: no adverse reaction  Functional change: able to get down and up from the floor twice    Pain: not rated  Location: B back    Objective   Pt received amb independently, + BLE lymphedema. Pt not wearing bandages today  Jian received therapeutic exercises to develop strength, endurance, ROM and core stabilization for 50minutes includin:1 for 20min      Lower Extremity Strength (seated)  Right LE  Left LE    Hip flexion: 5/5 Hip flexion: 5/5   Hip extension:  >/=4/5 Hip extension: >/=4/5   Hip abduction: 5/5 Hip abduction: 5/5   Hip adduction: 5/5 Hip adduction 5/5   Knee extension: 5/5 Knee extension: 5/5   Knee flexion: 5/5 Knee flexion: 5/5   Ankle dorsiflexion: 5/5 Ankle dorsiflexion: 5/5   Ankle plantarflexion: 5/5 Ankle plantarflexion: 5/5         Machines  · Precor Leg press: 40#, but deeper squat, 2  X 20 reps   · Precor leg press: 80#, 2 x 20 reps  · Recumbent bike,  8min, Level 3    Sitting    · Sit <> stand, 2 x 10 reps, no hands, 10# kettlebell upside down, " cues to perform slowly      Standing    · Heel raises 2 x 10   · TRX squats, 2 x 10  · Hamstring curls,  2 x 10  · Farmer's carry, 2 laps, 10# kettlebell  · Hip flexion on hip rotary, 25#, 10/side  · Hip ABD on Rotary, 2 x 10, 25#    CMS Impairment/Limitation/Restriction for FOTO Muscle, tendon, and soft tissue Survey     Therapist reviewed FOTO scores for Jian Arrieta on 11/18/2019.   FOTO documents entered into [employer] - see Media section.     Limitation Score: 55%  Category: Mobility    G-code:  Current/Discharge: CK at least 40% < 60% impaired, limited or restricted  Goal: CK at least 40% < 60% impaired, limited or restricted                       Home Exercises Provided and Patient Education Provided     Education provided:     - Cues with exercises, questions that are appropriate for med fitness and what that referral entails/includes    Written Home Exercises Provided: Continue prior HEP  Exercises were reviewed and Jian was able to demonstrate them prior to the end of the session.  Jian demonstrated good  understanding of the education provided.     See EMR under Patient Instructions for exercises provided 8/20/2019.    Assessment   Pt tolerated session well today and reports being able to get down to the floor and back up two times. Pt has met goals as noted below and physician has already placed medical fitness referral. Pt is appropriate for discharge from PT at this time and is in agreement with plan.   Goals:  Short Term Goals: 4 weeks   1. Pt will tolerate HEP for improved strength, functional mobility, ROM, posture, and endurance. (Met, 11/5)  2. Pt will demo >/= 4/5 strength in BLE's for improved functional mobility, endurance, and posture. (met 11/18)  3. Pt will reduce TUG (timed up and go) time to </= 17 sec for improved safety with community ambulation and decreased falls risk. (exceeded 9/10)  4. Pt will perform 9 sit <> stands with use of armrests in 30s for improved endurance. (exceeded,  9/10)  5. Pt will maintain balance in MCTSIB (Modified Clinical Test of Sensory Interaction on Balance) scenario 3 for >/=30s for improved balance/decreased falls risk. (met, 9/10)  6. Pt will report feeling more steady/confident on his feet when walking at home and in the community for improved functional mobility. (Met, 10/3)     Long Term Goals: 8 weeks   1. Pt will be I with updated HEP for improved functional mobility, posture, strength, and endurance. (Met, 11/18)  2. Pt will demo 5/5 strength in BLE's for improved functional mobility, endurance, and posture. (Met except for hip ext)  3. Pt will reduce TUG (timed up and go) time to </= 11 sec for improved safety with community ambulation and decreased falls risk. (Met, 10/3  4. Pt will perform 11 sit <> stands with use of armrests in 30s for improved endurance. (Exceeded, 10/3)  5. Pt will maintain balance in MCTSIB (Modified Clinical Test of Sensory Interaction on Balance) scenario 4 for >/=30s for improved balance/decreased falls risk. (Met 11/5  6. Pt will report/demo negotiating 1 flight of stairs with Mod I for improved functional mobility at home and in the community. (Met, 10/3)    New goal:  7. Pt will report improved ability to stand from low surface and/or kneeling position for improved functional mobility. (Met, 10/24)    Plan     Discharge from PT    Dora Mendez, PT

## 2019-11-19 ENCOUNTER — PATIENT OUTREACH (OUTPATIENT)
Dept: ADMINISTRATIVE | Facility: OTHER | Age: 64
End: 2019-11-19

## 2019-11-19 DIAGNOSIS — E11.42 DM TYPE 2 WITH DIABETIC PERIPHERAL NEUROPATHY: Primary | ICD-10-CM

## 2019-11-22 ENCOUNTER — OFFICE VISIT (OUTPATIENT)
Dept: INTERNAL MEDICINE | Facility: CLINIC | Age: 64
End: 2019-11-22
Payer: MEDICARE

## 2019-11-22 ENCOUNTER — TELEPHONE (OUTPATIENT)
Dept: INTERNAL MEDICINE | Facility: CLINIC | Age: 64
End: 2019-11-22

## 2019-11-22 ENCOUNTER — OFFICE VISIT (OUTPATIENT)
Dept: OPTOMETRY | Facility: CLINIC | Age: 64
End: 2019-11-22
Payer: MEDICARE

## 2019-11-22 VITALS
WEIGHT: 263.25 LBS | HEIGHT: 67 IN | HEART RATE: 91 BPM | TEMPERATURE: 98 F | DIASTOLIC BLOOD PRESSURE: 70 MMHG | RESPIRATION RATE: 18 BRPM | BODY MASS INDEX: 41.32 KG/M2 | SYSTOLIC BLOOD PRESSURE: 122 MMHG

## 2019-11-22 DIAGNOSIS — H52.13 MYOPIA WITH ASTIGMATISM AND PRESBYOPIA, BILATERAL: ICD-10-CM

## 2019-11-22 DIAGNOSIS — E11.22 TYPE 2 DIABETES MELLITUS WITH STAGE 3 CHRONIC KIDNEY DISEASE, WITHOUT LONG-TERM CURRENT USE OF INSULIN: Primary | ICD-10-CM

## 2019-11-22 DIAGNOSIS — H52.4 MYOPIA WITH ASTIGMATISM AND PRESBYOPIA, BILATERAL: ICD-10-CM

## 2019-11-22 DIAGNOSIS — E11.9 TYPE 2 DIABETES MELLITUS WITHOUT RETINOPATHY: Primary | ICD-10-CM

## 2019-11-22 DIAGNOSIS — N18.30 TYPE 2 DIABETES MELLITUS WITH STAGE 3 CHRONIC KIDNEY DISEASE, WITHOUT LONG-TERM CURRENT USE OF INSULIN: Primary | ICD-10-CM

## 2019-11-22 DIAGNOSIS — E16.2 HYPOGLYCEMIA: ICD-10-CM

## 2019-11-22 DIAGNOSIS — H52.203 MYOPIA WITH ASTIGMATISM AND PRESBYOPIA, BILATERAL: ICD-10-CM

## 2019-11-22 DIAGNOSIS — H25.13 NUCLEAR SCLEROSIS OF BOTH EYES: ICD-10-CM

## 2019-11-22 LAB
GLUCOSE SERPL-MCNC: 129 MG/DL (ref 70–110)
GLUCOSE SERPL-MCNC: 43 MG/DL (ref 70–110)
GLUCOSE SERPL-MCNC: 58 MG/DL (ref 70–110)
GLUCOSE SERPL-MCNC: 68 MG/DL (ref 70–110)

## 2019-11-22 PROCEDURE — 3008F PR BODY MASS INDEX (BMI) DOCUMENTED: ICD-10-PCS | Mod: CPTII,S$GLB,, | Performed by: INTERNAL MEDICINE

## 2019-11-22 PROCEDURE — 3078F DIAST BP <80 MM HG: CPT | Mod: CPTII,S$GLB,, | Performed by: INTERNAL MEDICINE

## 2019-11-22 PROCEDURE — 3008F BODY MASS INDEX DOCD: CPT | Mod: CPTII,S$GLB,, | Performed by: INTERNAL MEDICINE

## 2019-11-22 PROCEDURE — 3046F PR MOST RECENT HEMOGLOBIN A1C LEVEL > 9.0%: ICD-10-PCS | Mod: CPTII,S$GLB,, | Performed by: INTERNAL MEDICINE

## 2019-11-22 PROCEDURE — 3074F SYST BP LT 130 MM HG: CPT | Mod: CPTII,S$GLB,, | Performed by: INTERNAL MEDICINE

## 2019-11-22 PROCEDURE — 1101F PR PT FALLS ASSESS DOC 0-1 FALLS W/OUT INJ PAST YR: ICD-10-PCS | Mod: CPTII,S$GLB,, | Performed by: INTERNAL MEDICINE

## 2019-11-22 PROCEDURE — 99999 PR PBB SHADOW E&M-EST. PATIENT-LVL III: CPT | Mod: PBBFAC,,, | Performed by: INTERNAL MEDICINE

## 2019-11-22 PROCEDURE — 1101F PT FALLS ASSESS-DOCD LE1/YR: CPT | Mod: CPTII,S$GLB,, | Performed by: INTERNAL MEDICINE

## 2019-11-22 PROCEDURE — 3074F PR MOST RECENT SYSTOLIC BLOOD PRESSURE < 130 MM HG: ICD-10-PCS | Mod: CPTII,S$GLB,, | Performed by: INTERNAL MEDICINE

## 2019-11-22 PROCEDURE — 99499 RISK ADDL DX/OHS AUDIT: ICD-10-PCS | Mod: S$GLB,,, | Performed by: INTERNAL MEDICINE

## 2019-11-22 PROCEDURE — 99999 PR PBB SHADOW E&M-EST. PATIENT-LVL II: CPT | Mod: PBBFAC,,, | Performed by: OPTOMETRIST

## 2019-11-22 PROCEDURE — 92004 COMPRE OPH EXAM NEW PT 1/>: CPT | Mod: S$GLB,,, | Performed by: OPTOMETRIST

## 2019-11-22 PROCEDURE — 3078F PR MOST RECENT DIASTOLIC BLOOD PRESSURE < 80 MM HG: ICD-10-PCS | Mod: CPTII,S$GLB,, | Performed by: INTERNAL MEDICINE

## 2019-11-22 PROCEDURE — 92015 DETERMINE REFRACTIVE STATE: CPT | Mod: S$GLB,,, | Performed by: OPTOMETRIST

## 2019-11-22 PROCEDURE — 99499 UNLISTED E&M SERVICE: CPT | Mod: S$GLB,,, | Performed by: INTERNAL MEDICINE

## 2019-11-22 PROCEDURE — 82962 POCT GLUCOSE, HAND-HELD DEVICE: ICD-10-PCS | Mod: S$GLB,,, | Performed by: INTERNAL MEDICINE

## 2019-11-22 PROCEDURE — 99999 PR PBB SHADOW E&M-EST. PATIENT-LVL III: ICD-10-PCS | Mod: PBBFAC,,, | Performed by: INTERNAL MEDICINE

## 2019-11-22 PROCEDURE — 92015 PR REFRACTION: ICD-10-PCS | Mod: S$GLB,,, | Performed by: OPTOMETRIST

## 2019-11-22 PROCEDURE — 99214 OFFICE O/P EST MOD 30 MIN: CPT | Mod: S$GLB,,, | Performed by: INTERNAL MEDICINE

## 2019-11-22 PROCEDURE — 82962 GLUCOSE BLOOD TEST: CPT | Mod: S$GLB,,, | Performed by: INTERNAL MEDICINE

## 2019-11-22 PROCEDURE — 99214 PR OFFICE/OUTPT VISIT, EST, LEVL IV, 30-39 MIN: ICD-10-PCS | Mod: S$GLB,,, | Performed by: INTERNAL MEDICINE

## 2019-11-22 PROCEDURE — 3046F HEMOGLOBIN A1C LEVEL >9.0%: CPT | Mod: CPTII,S$GLB,, | Performed by: INTERNAL MEDICINE

## 2019-11-22 PROCEDURE — 92004 PR EYE EXAM, NEW PATIENT,COMPREHESV: ICD-10-PCS | Mod: S$GLB,,, | Performed by: OPTOMETRIST

## 2019-11-22 PROCEDURE — 99999 PR PBB SHADOW E&M-EST. PATIENT-LVL II: ICD-10-PCS | Mod: PBBFAC,,, | Performed by: OPTOMETRIST

## 2019-11-22 NOTE — PROGRESS NOTES
HPI     MARCELA:?  Glasses? Yes   Contacts? no  H/o eye surgery, injections or laser: no  H/o eye injury: no  Known eye conditions? no  Family h/o eye conditions? no  Eye gtts?no    (-) Flashes (-) Floaters (-) Mucous   (-) Tearing (-) Itching (-) Burning   (-) Headaches (-) Eye Pain/discomfort (-) Irritation   (-) Redness (-) Double vision (-) Blurry vision    Diabetic? (+)  A1c?  (Hemoglobin A1C       Date                     Value               Ref Range             Status                11/15/2019               9.5 (H)             4.0 - 5.6 %           Final              Comment:    ADA Screening Guidelines:  5.7-6.4%  Consistent with   prediabetes  >or=6.5%  Consistent with diabetes  High levels of fetal   hemoglobin interfere with the HbA1C  assay. Heterozygous hemoglobin   variants (HbS, HgC, etc)do  not significantly interfere with this assay.     However, presence of multiple variants may affect accuracy.         05/23/2019               7.1 (H)             4.0 - 5.6 %           Final              Comment:    ADA Screening Guidelines:  5.7-6.4%  Consistent with   prediabetes  >or=6.5%  Consistent with diabetes  High levels of fetal   hemoglobin interfere with the HbA1C  assay. Heterozygous hemoglobin   variants (HbS, HgC, etc)do  not significantly interfere with this assay.     However, presence of multiple variants may affect accuracy.         03/18/2019               7.4 (H)             4.0 - 5.6 %           Final              Comment:    ADA Screening Guidelines:  5.7-6.4%  Consistent with   prediabetes  >or=6.5%  Consistent with diabetes  High levels of fetal   hemoglobin interfere with the HbA1C  assay. Heterozygous hemoglobin   variants (HbS, HgC, etc)do  not significantly interfere with this assay.     However, presence of multiple variants may affect accuracy.    ----------)        Last edited by Mahogany Guido on 11/22/2019  8:10 AM. (History)            Assessment /Plan     For exam results, see  Encounter Report.    Type 2 diabetes mellitus without retinopathy    Nuclear sclerosis of both eyes    Myopia with astigmatism and presbyopia, bilateral      1. BS control. No signs of diabetic retinopathy. Monitor with annual exam.  2. Nuclear sclerotic cataract - not visually significant. Observe.  3. SRx released to patient. Patient educated on lens options. Normal ocular health. RTC 1 year for routine exam.

## 2019-11-22 NOTE — PROGRESS NOTES
Subjective:       Patient ID: Jian Arrieta is a 64 y.o. male.    Chief Complaint: Follow-up (1 week ) and Flank Pain (RIGHT)    HPI   Pt here for 1 wk f/u regarding T2DM. His insulin levels were adjusted up at last visit. Per pt his blood sugars are still running in the 200s(he forgot to bring in his blood sugar logs for review). He tells me he has not been taking his Novolog TID and mainly only taking it in the am.    Blood sugar in the clinic today was 43(pt did not eat breakfast). He was given glucose tabs and his blood sugar increased to 129.  Review of Systems   Constitutional: Positive for fatigue. Negative for activity change, appetite change, chills, diaphoresis, fever and unexpected weight change.   HENT: Negative for postnasal drip, rhinorrhea, sinus pressure, sneezing, sore throat, trouble swallowing and voice change.    Respiratory: Negative for cough, shortness of breath and wheezing.    Cardiovascular: Negative for chest pain, palpitations and leg swelling.   Gastrointestinal: Negative for abdominal pain, blood in stool, constipation, diarrhea, nausea and vomiting.   Genitourinary: Negative for dysuria.   Musculoskeletal: Negative for arthralgias and myalgias.   Skin: Negative for rash and wound.   Allergic/Immunologic: Negative for environmental allergies and food allergies.   Hematological: Negative for adenopathy. Does not bruise/bleed easily.       Objective:      Physical Exam   Constitutional: He is oriented to person, place, and time. He appears well-developed and well-nourished. No distress.   HENT:   Head: Normocephalic and atraumatic.   Eyes: Pupils are equal, round, and reactive to light. Conjunctivae and EOM are normal. Right eye exhibits no discharge. Left eye exhibits no discharge. No scleral icterus.   Neck: Normal range of motion. Neck supple. No JVD present.   Cardiovascular: Normal rate, regular rhythm and normal heart sounds.   No murmur heard.  Pulmonary/Chest: Effort normal and  breath sounds normal. No respiratory distress. He has no wheezes. He has no rales.   Musculoskeletal: He exhibits no edema.   Lymphadenopathy:     He has no cervical adenopathy.   Neurological: He is alert and oriented to person, place, and time.   Skin: Skin is warm and dry. No rash noted. He is not diaphoretic. No pallor.       Assessment:       1. Type 2 diabetes mellitus with stage 3 chronic kidney disease, without long-term current use of insulin    2. Hypoglycemia        Plan:    1. Decrease Novolog to 5 units TID(was only taking it in the am) and continue Levemir at 20 units qHS       Referral to Endocrine    2. Resolved with glucose tabs       Pt advised not to miss meals when giving the insulin    3. F/u in 1 wk for DM

## 2019-11-22 NOTE — LETTER
November 22, 2019      Leon Barajas DO  [address] [facility]  [place] [address]           [place] [facility]  [address] [facility].  [place] [address]  Phone: [phone]  Fax: [phone]          Patient: Jian Arrieta   MR Number: [id number]   YOB: 1955   Date of Visit: 11/22/2019       Dear Dr. Leon Barajas:    Thank you for referring Jian Arrieta to me for evaluation. Attached you will find relevant portions of my assessment and plan of care.    If you have questions, please do not hesitate to call me. I look forward to following Jian Arrieta along with you.    Sincerely,    Demetri Patton, OD    Enclosure  CC:  No Recipients    If you would like to receive this communication electronically, please contact [email or web address] or [phone] to request more information on [employer] Link access.    For providers and/or their staff who would like to refer a patient to [facility], please contact us through our one-stop-shop provider referral line, [facility], at [phone].    If you feel you have received this communication in error or would no longer like to receive these types of communications, please e-mail [email or web address]

## 2019-11-29 ENCOUNTER — OFFICE VISIT (OUTPATIENT)
Dept: INTERNAL MEDICINE | Facility: CLINIC | Age: 64
End: 2019-11-29
Payer: MEDICARE

## 2019-11-29 VITALS
HEART RATE: 93 BPM | TEMPERATURE: 99 F | RESPIRATION RATE: 15 BRPM | SYSTOLIC BLOOD PRESSURE: 122 MMHG | HEIGHT: 67 IN | WEIGHT: 269.19 LBS | BODY MASS INDEX: 42.25 KG/M2 | DIASTOLIC BLOOD PRESSURE: 74 MMHG

## 2019-11-29 DIAGNOSIS — N18.4 TYPE 2 DIABETES MELLITUS WITH STAGE 4 CHRONIC KIDNEY DISEASE, WITHOUT LONG-TERM CURRENT USE OF INSULIN: Primary | ICD-10-CM

## 2019-11-29 DIAGNOSIS — E11.22 TYPE 2 DIABETES MELLITUS WITH STAGE 4 CHRONIC KIDNEY DISEASE, WITHOUT LONG-TERM CURRENT USE OF INSULIN: Primary | ICD-10-CM

## 2019-11-29 PROCEDURE — 99999 PR PBB SHADOW E&M-EST. PATIENT-LVL III: ICD-10-PCS | Mod: PBBFAC,,, | Performed by: INTERNAL MEDICINE

## 2019-11-29 PROCEDURE — 99213 PR OFFICE/OUTPT VISIT, EST, LEVL III, 20-29 MIN: ICD-10-PCS | Mod: S$GLB,,, | Performed by: INTERNAL MEDICINE

## 2019-11-29 PROCEDURE — 99499 RISK ADDL DX/OHS AUDIT: ICD-10-PCS | Mod: S$GLB,,, | Performed by: INTERNAL MEDICINE

## 2019-11-29 PROCEDURE — 99499 UNLISTED E&M SERVICE: CPT | Mod: S$GLB,,, | Performed by: INTERNAL MEDICINE

## 2019-11-29 PROCEDURE — 99213 OFFICE O/P EST LOW 20 MIN: CPT | Mod: S$GLB,,, | Performed by: INTERNAL MEDICINE

## 2019-11-29 PROCEDURE — 99999 PR PBB SHADOW E&M-EST. PATIENT-LVL III: CPT | Mod: PBBFAC,,, | Performed by: INTERNAL MEDICINE

## 2019-11-29 NOTE — PROGRESS NOTES
Subjective:       Patient ID: Jian Arrieta is a 64 y.o. male.    Chief Complaint: Follow-up (1 week)    HPI   Pt here for 1 wk f/u regarding uncontrolled DM. At last visit his insulin was adjusted up His blood sugars are running in the high 300s.   Review of Systems   Constitutional: Negative for activity change, appetite change, chills, diaphoresis, fatigue, fever and unexpected weight change.   HENT: Negative for postnasal drip, rhinorrhea, sinus pressure, sneezing, sore throat, trouble swallowing and voice change.    Respiratory: Negative for cough, shortness of breath and wheezing.    Cardiovascular: Negative for chest pain, palpitations and leg swelling.   Gastrointestinal: Negative for abdominal pain, blood in stool, constipation, diarrhea, nausea and vomiting.   Genitourinary: Negative for dysuria.   Musculoskeletal: Negative for arthralgias and myalgias.   Skin: Negative for rash and wound.   Allergic/Immunologic: Negative for environmental allergies and food allergies.   Hematological: Negative for adenopathy. Does not bruise/bleed easily.       Objective:      Physical Exam   Constitutional: He is oriented to person, place, and time. He appears well-developed and well-nourished. No distress.   HENT:   Head: Normocephalic and atraumatic.   Eyes: Pupils are equal, round, and reactive to light. Conjunctivae and EOM are normal. Right eye exhibits no discharge. Left eye exhibits no discharge. No scleral icterus.   Neck: Normal range of motion. Neck supple. No JVD present.   Cardiovascular: Normal rate, regular rhythm and normal heart sounds.   No murmur heard.  Pulmonary/Chest: Effort normal and breath sounds normal. No respiratory distress. He has no wheezes. He has no rales.   Musculoskeletal: He exhibits no edema.   Lymphadenopathy:     He has no cervical adenopathy.   Neurological: He is alert and oriented to person, place, and time.   Skin: Skin is warm and dry. No rash noted. He is not diaphoretic. No  jacquie.       Assessment:       1. Type 2 diabetes mellitus with stage 4 chronic kidney disease, without long-term current use of insulin        Plan:    1. Increase Novolog to 10 units TID and continue Levemir at 20 units qHS       F/u with Endocrine next Friday

## 2019-12-05 ENCOUNTER — PATIENT OUTREACH (OUTPATIENT)
Dept: ADMINISTRATIVE | Facility: OTHER | Age: 64
End: 2019-12-05

## 2019-12-06 ENCOUNTER — OFFICE VISIT (OUTPATIENT)
Dept: ENDOCRINOLOGY | Facility: CLINIC | Age: 64
End: 2019-12-06
Payer: MEDICARE

## 2019-12-06 VITALS
SYSTOLIC BLOOD PRESSURE: 98 MMHG | HEART RATE: 56 BPM | RESPIRATION RATE: 18 BRPM | DIASTOLIC BLOOD PRESSURE: 54 MMHG | HEIGHT: 67 IN | WEIGHT: 262.88 LBS | BODY MASS INDEX: 41.26 KG/M2

## 2019-12-06 DIAGNOSIS — N18.30 TYPE 2 DIABETES MELLITUS WITH STAGE 3 CHRONIC KIDNEY DISEASE, WITHOUT LONG-TERM CURRENT USE OF INSULIN: Primary | ICD-10-CM

## 2019-12-06 DIAGNOSIS — E11.42 DM TYPE 2 WITH DIABETIC PERIPHERAL NEUROPATHY: ICD-10-CM

## 2019-12-06 DIAGNOSIS — E11.22 TYPE 2 DIABETES MELLITUS WITH STAGE 3 CHRONIC KIDNEY DISEASE, WITHOUT LONG-TERM CURRENT USE OF INSULIN: Primary | ICD-10-CM

## 2019-12-06 DIAGNOSIS — Z91.199 NON COMPLIANCE WITH MEDICAL TREATMENT: ICD-10-CM

## 2019-12-06 DIAGNOSIS — K86.1 IDIOPATHIC CHRONIC PANCREATITIS: Chronic | ICD-10-CM

## 2019-12-06 PROCEDURE — 99499 RISK ADDL DX/OHS AUDIT: ICD-10-PCS | Mod: S$GLB,,, | Performed by: INTERNAL MEDICINE

## 2019-12-06 PROCEDURE — 3078F PR MOST RECENT DIASTOLIC BLOOD PRESSURE < 80 MM HG: ICD-10-PCS | Mod: CPTII,S$GLB,, | Performed by: INTERNAL MEDICINE

## 2019-12-06 PROCEDURE — 3046F HEMOGLOBIN A1C LEVEL >9.0%: CPT | Mod: CPTII,S$GLB,, | Performed by: INTERNAL MEDICINE

## 2019-12-06 PROCEDURE — 3008F PR BODY MASS INDEX (BMI) DOCUMENTED: ICD-10-PCS | Mod: CPTII,S$GLB,, | Performed by: INTERNAL MEDICINE

## 2019-12-06 PROCEDURE — 3008F BODY MASS INDEX DOCD: CPT | Mod: CPTII,S$GLB,, | Performed by: INTERNAL MEDICINE

## 2019-12-06 PROCEDURE — 99204 PR OFFICE/OUTPT VISIT, NEW, LEVL IV, 45-59 MIN: ICD-10-PCS | Mod: S$GLB,,, | Performed by: INTERNAL MEDICINE

## 2019-12-06 PROCEDURE — 3078F DIAST BP <80 MM HG: CPT | Mod: CPTII,S$GLB,, | Performed by: INTERNAL MEDICINE

## 2019-12-06 PROCEDURE — 99999 PR PBB SHADOW E&M-EST. PATIENT-LVL IV: CPT | Mod: PBBFAC,,, | Performed by: INTERNAL MEDICINE

## 2019-12-06 PROCEDURE — 99499 UNLISTED E&M SERVICE: CPT | Mod: S$GLB,,, | Performed by: INTERNAL MEDICINE

## 2019-12-06 PROCEDURE — 99999 PR PBB SHADOW E&M-EST. PATIENT-LVL IV: ICD-10-PCS | Mod: PBBFAC,,, | Performed by: INTERNAL MEDICINE

## 2019-12-06 PROCEDURE — 3074F PR MOST RECENT SYSTOLIC BLOOD PRESSURE < 130 MM HG: ICD-10-PCS | Mod: CPTII,S$GLB,, | Performed by: INTERNAL MEDICINE

## 2019-12-06 PROCEDURE — 99204 OFFICE O/P NEW MOD 45 MIN: CPT | Mod: S$GLB,,, | Performed by: INTERNAL MEDICINE

## 2019-12-06 PROCEDURE — 3046F PR MOST RECENT HEMOGLOBIN A1C LEVEL > 9.0%: ICD-10-PCS | Mod: CPTII,S$GLB,, | Performed by: INTERNAL MEDICINE

## 2019-12-06 PROCEDURE — 3074F SYST BP LT 130 MM HG: CPT | Mod: CPTII,S$GLB,, | Performed by: INTERNAL MEDICINE

## 2019-12-06 PROCEDURE — 1101F PT FALLS ASSESS-DOCD LE1/YR: CPT | Mod: CPTII,S$GLB,, | Performed by: INTERNAL MEDICINE

## 2019-12-06 PROCEDURE — 1101F PR PT FALLS ASSESS DOC 0-1 FALLS W/OUT INJ PAST YR: ICD-10-PCS | Mod: CPTII,S$GLB,, | Performed by: INTERNAL MEDICINE

## 2019-12-06 NOTE — ASSESSMENT & PLAN NOTE
Reviewed goals of therapy are to get the best control we can without hypoglycemia    Referred to education today - in need of extensive diabetes education.    Medication changes: Current BG data is not sufficient to suggest medication adjustments. I asked him to check BG 4 times daily for the next several weeks and send in BG logs for review and adjustment of his regimen.  Increase: Novolog to 10 units BID  Continue: Levemir 20 units daily    Reviewed patient's current insulin regimen. Clarified proper insulin dose and timing in relation to meals, not giving novolog insulin unless he plans to eat and using novolog to cover supper as well. Insulin injection sites and proper rotation instructed.      Advised frequent self blood glucose monitoring. Patient encouraged to document glucose results and bring them to every clinic visit.    Hypoglycemia precautions discussed. Glucagon emergency pen prescribed    Close adherence to lifestyle changes recommended.      Eyes: UTD; no retinopathy on recent exam  Feet: Foot exam up to date  Kidneys: Urine protein normal earlier this year. Would defer checking microalbumin/Cr until DM under better control.  HbA1c: Due in 3 months.  Lipids: at goal  ASA: On eliquis

## 2019-12-06 NOTE — LETTER
December 6, 2019      Loen Barajas, DO  [address] [facility]  [place] [address]           [facility] - Endocrinology 6th [place]  [address]  [facility] [address]  Phone: [phone]  Fax: [phone]          Patient: Jian Arrieta   MR Number: [id number]   YOB: 1955   Date of Visit: 12/6/2019       Dear Dr. Leon Barajas:    Thank you for referring Jian Arrieta to me for evaluation. Attached you will find relevant portions of my assessment and plan of care.    If you have questions, please do not hesitate to call me. I look forward to following Jian Arrieta along with you.    Sincerely,    Huan Carney MD    Enclosure  CC:  No Recipients    If you would like to receive this communication electronically, please contact [email or web address] or [phone] to request more information on [employer] Link access.    For providers and/or their staff who would like to refer a patient to [facility], please contact us through our one-stop-shop provider referral line, [facility], at [phone].    If you feel you have received this communication in error or would no longer like to receive these types of communications, please e-mail [email or web address]

## 2019-12-06 NOTE — ASSESSMENT & PLAN NOTE
He does not seem to be following the recommendations of his physicians regarding diabetes care. I explained to him the importance of adhering to his insulin regimen.

## 2019-12-06 NOTE — PATIENT INSTRUCTIONS
Check blood sugar 4 times per day. (before each meal and once at bedtime).    Inject novolog 10 units with breakfast and supper. Inject novolog 5 minutes prior to eating. Do not inject novolog if you do not plan to eat.    Inject levemir in the morning at the same time every day. Levemir does not have to be given with food.

## 2019-12-06 NOTE — PROGRESS NOTES
Subjective:      Chief Complaint: Consult for uncontrolled type 2 diabetes    HPI: Jian Arrieta is a 64 y.o. male who is here for an initial evaluation for type 2 diabetes mellitus    He has an extensive past medical history:  Past Medical History:   Diagnosis Date    Alcohol abuse     Anasarca 1/28/2019    Arthropathy associated with neurological disorder 9/2/2015    Atherosclerosis     Charcot foot due to diabetes mellitus     Chronic combined systolic and diastolic heart failure 01/29/2019 1-28-19 Left VentricleModerate decreased ejection fraction at 30%. Normal left ventricular cavity size. Normal wall thickness observed. Grade I (mild) left ventricular diastolic dysfunction consistent with impaired relaxation. Normal left atrial pressure. Moderate, global hypokinesis(see wall scoring diagram). Right VentricleNormal cavity size, wall thickness and ejection fraction. Wall motion n    Chronic pancreatitis 1/28/2019    Colon polyps     approx 5 yrs ago    Coronary artery disease due to calcified coronary lesion 05/08/2015    5 stents on ASA      Diabetic polyneuropathy associated with type 2 diabetes mellitus 9/2/2015    Diverticulosis 1/28/2019    Essential hypertension 1/28/2019    Former smoker 8/26/2015    Healed ulcer of left foot on examination 6/20/2017    Lymphedema of both lower extremities 1/29/2019    Mixed hyperlipidemia 5/8/2015    Morbid obesity with BMI of 50.0-59.9, adult 5/8/2015    Onychomycosis of multiple toenails with type 2 diabetes mellitus and peripheral neuropathy 6/20/2017    Pseudocyst of pancreas 1/28/2019 1-28-19 Liver has a cirrhotic morphology with no focal lesions.  Significant interval increase in ascites when compared to prior exam which may account for patient's abdominal distension.  Hypodense air-fluid collection along the body of the pancreas which is slightly smaller when compared to prior CT.  Findings may relate to pancreatic necrosis with  "pancreatic pseudocysts with infected pseudocyst    Sleep apnea 8/26/2015    Status post bariatric surgery 1/11/2016       With regards to the diabetes:  Current symptoms/problems include hyperglycemia and have been worsening. Symptoms have been present for several months. Patient was diagnosed with cirrhosis with recurrent ascites and chronic pancreatitis in the past, initially attributed to EtOH intake. He was later found to have a portal vein thrombosis and underwent angioplasty with resulting improvement in the ascites. His renal function has been improving gradually and his BG is now trending up.    The patient was initially diagnosed with Type 2 diabetes mellitus:  2016.    Known diabetic complications: peripheral neuropathy  Cardiovascular risk factors: advanced age (older than 55 for men, 65 for women), diabetes mellitus, dyslipidemia, male gender, obesity (BMI >= 30 kg/m2) and sedentary lifestyle  Current diabetic medications include:   1) Levemir 20 units qhs  2) Novolog 10 units TID w/ meals (only taking once daily with his morning coffee)    He typically injects both shots of insulin when he first wakes up. He does not always eat breakfast right away. He does not check his sugars or give any additional novolog throughout the day despite being counseled to do this previously. Injects into his abdomen.    Previous meds:  Metformin    Prior visit with dietician: no  Current diet: Admits to poor diet.  Breakfast: eggs, galo, grits  Lunch: Usually skips  Supper: "Pot-luck"; "too much fried food".   Snacks on rice/wheat thins  Current exercise: none    Current monitoring regimen: Testing only once daily.  Home blood sugar records: Mostly in 200s-400s; lowest in 140  Any episodes of hypoglycemia? Had low glucose at PCP visit after taking novolog and not eating breakfast.    Diabetic Health Maintenance:        HbA1c < 7.0%: No        Blood pressure <130/80:  Yes          BP Readings from Last 3 Encounters: "   12/06/19 (!) 98/54   11/29/19 122/74   11/22/19 122/70           Low dose ASA?: No        Glucagon emergency kit?: No        Medical alert tag?: No    Wt Readings from Last 10 Encounters:   12/06/19 119.3 kg (262 lb 14.4 oz)   11/29/19 122.1 kg (269 lb 2.9 oz)   11/22/19 119.4 kg (263 lb 3.7 oz)   11/15/19 121.2 kg (267 lb 3.2 oz)   09/05/19 121.1 kg (267 lb)   08/26/19 121.3 kg (267 lb 8.5 oz)   08/23/19 122.5 kg (270 lb)   08/22/19 120.7 kg (266 lb)   08/22/19 121.1 kg (266 lb 15.6 oz)   08/20/19 122 kg (269 lb)       Diabetes Management Status    Statin: Not taking  ACE/ARB: Not taking    Screening or Prevention Patient's value Goal Complete/Controlled?   HgA1C Testing and Control   Lab Results   Component Value Date    HGBA1C 9.5 (H) 11/15/2019      Annually/Less than 8% No   Lipid profile : 01/28/2019 Annually Yes   LDL control Lab Results   Component Value Date    LDLCALC 42.8 (L) 01/28/2019    Annually/Less than 100 mg/dl  Yes   Nephropathy screening Normal U Pr/Cr in 5/2019    Lab Results   Component Value Date    PROTEINUA 1+ (A) 07/09/2019    Annually Yes   Blood pressure BP Readings from Last 1 Encounters:   12/06/19 (!) 98/54    Less than 140/90 Yes   Dilated retinal exam : 11/22/2019 Annually Yes   Foot exam   : 11/15/2019 Annually Yes        Lab Results   Component Value Date    HGBA1C 9.5 (H) 11/15/2019    HGBA1C 7.1 (H) 05/23/2019    HGBA1C 7.4 (H) 03/18/2019     He has chronic bilateral lymphedema and is being seen in a therapy clinic for lymphedema patients. Following regularly with wound care as well.     Reviewed past medical, family, social history and updated as appropriate.    Review of Systems   Constitutional: Negative for unexpected weight change.   Eyes: Negative for visual disturbance.   Respiratory: Negative for shortness of breath.    Cardiovascular: Negative for chest pain.   Gastrointestinal: Negative for abdominal pain.   Genitourinary: Negative for urgency.   Musculoskeletal:  Negative for arthralgias.   Skin: Negative for wound.   Neurological: Negative for headaches.   Hematological: Does not bruise/bleed easily.   Psychiatric/Behavioral: Negative for sleep disturbance.     Objective:     Vitals:    12/06/19 0934   BP: (!) 98/54   Pulse: (!) 56   Resp: 18     BP Readings from Last 5 Encounters:   12/06/19 (!) 98/54   11/29/19 122/74   11/22/19 122/70   11/15/19 96/70   09/05/19 106/88         Physical Exam   Constitutional: He is oriented to person, place, and time. He appears well-developed. No distress.   HENT:   Right Ear: External ear normal.   Left Ear: External ear normal.   Nose: Nose normal.   Morbidly obese   Eyes: Conjunctivae are normal. Right eye exhibits no discharge. Left eye exhibits no discharge.   Neck: No tracheal deviation present. No thyromegaly present.   Cardiovascular: Normal rate.   No murmur heard.  Irregular rhythm; skipped beat every 3rd cardiac cycle (trigeminy?)   Pulmonary/Chest: Effort normal and breath sounds normal.   Abdominal: Soft. He exhibits no mass. There is no tenderness.   Musculoskeletal: He exhibits edema (4+ bilateral leg edema).   No digital clubbing or extremity cyanosis   Neurological: He is alert and oriented to person, place, and time. Coordination normal.   Skin: No rash noted.   No subcutaneous nodules noted.   Psychiatric: He has a normal mood and affect. His behavior is normal.   Alert and oriented to person, place, and situation.   Nursing note and vitals reviewed.  Foot exam: Deferred - done recently by other providers    Wt Readings from Last 10 Encounters:   12/06/19 0934 119.3 kg (262 lb 14.4 oz)   11/29/19 1320 122.1 kg (269 lb 2.9 oz)   11/22/19 0920 119.4 kg (263 lb 3.7 oz)   11/15/19 1049 121.2 kg (267 lb 3.2 oz)   09/05/19 1415 121.1 kg (267 lb)   08/26/19 1047 121.3 kg (267 lb 8.5 oz)   08/23/19 1315 122.5 kg (270 lb)   08/22/19 1437 120.7 kg (266 lb)   08/22/19 0844 121.1 kg (266 lb 15.6 oz)   08/20/19 1332 122 kg (269 lb)        Lab Results   Component Value Date    HGBA1C 9.5 (H) 11/15/2019    HGBA1C 7.1 (H) 05/23/2019    HGBA1C 7.4 (H) 03/18/2019    HGBA1C 6.9 (H) 01/28/2019     Lab Results   Component Value Date    CHOL 85 (L) 01/28/2019    CHOL 94 (L) 07/10/2015    HDL 31 (L) 01/28/2019    HDL 35 (L) 07/10/2015    LDLCALC 42.8 (L) 01/28/2019    LDLCALC 44.6 (L) 07/10/2015    TRIG 56 01/28/2019    TRIG 72 07/10/2015    CHOLHDL 36.5 01/28/2019    CHOLHDL 37.2 07/10/2015     Lab Results   Component Value Date     11/15/2019    K 4.8 11/15/2019     (H) 11/15/2019    CO2 23 11/15/2019     (H) 11/15/2019    BUN 13 11/15/2019    CREATININE 1.5 (H) 11/15/2019    CALCIUM 8.5 (L) 11/15/2019    PROT 5.9 (L) 11/15/2019    ALBUMIN 2.8 (L) 11/15/2019    BILITOT 0.4 11/15/2019    ALKPHOS 117 11/15/2019    AST 21 11/15/2019    ALT 17 11/15/2019    ANIONGAP 4 (L) 11/15/2019    ESTGFRAFRICA 55.7 (A) 11/15/2019    EGFRNONAA 48.2 (A) 11/15/2019    TSH 2.211 01/28/2019        Assessment/Plan:     Chronic pancreatitis  Avoid incretin based therapies (GLP-1 and DPP4)    DM type 2 with diabetic peripheral neuropathy  Following with wound care for foot health maintenance    Non compliance with medical treatment  He does not seem to be following the recommendations of his physicians regarding diabetes care. I explained to him the importance of adhering to his insulin regimen.    Type 2 diabetes mellitus with stage 3 chronic kidney disease, without long-term current use of insulin  Reviewed goals of therapy are to get the best control we can without hypoglycemia    Referred to education today - in need of extensive diabetes education.    Medication changes: Current BG data is not sufficient to suggest medication adjustments. I asked him to check BG 4 times daily for the next several weeks and send in BG logs for review and adjustment of his regimen.  Increase: Novolog to 10 units BID  Continue: Levemir 20 units daily    Reviewed patient's  current insulin regimen. Clarified proper insulin dose and timing in relation to meals, not giving novolog insulin unless he plans to eat and using novolog to cover supper as well. Insulin injection sites and proper rotation instructed.      Advised frequent self blood glucose monitoring. Patient encouraged to document glucose results and bring them to every clinic visit.    Hypoglycemia precautions discussed. Glucagon emergency pen prescribed    Close adherence to lifestyle changes recommended.      Eyes: UTD; no retinopathy on recent exam  Feet: Foot exam up to date  Kidneys: Urine protein normal earlier this year. Would defer checking microalbumin/Cr until DM under better control.  HbA1c: Due in 3 months.  Lipids: at goal  ASA: On eliquis      RTC in 3 months

## 2019-12-12 ENCOUNTER — PATIENT OUTREACH (OUTPATIENT)
Dept: ADMINISTRATIVE | Facility: OTHER | Age: 64
End: 2019-12-12

## 2019-12-12 ENCOUNTER — CLINICAL SUPPORT (OUTPATIENT)
Dept: DIABETES | Facility: CLINIC | Age: 64
End: 2019-12-12
Payer: MEDICARE

## 2019-12-12 DIAGNOSIS — N18.30 TYPE 2 DIABETES MELLITUS WITH STAGE 3 CHRONIC KIDNEY DISEASE, WITHOUT LONG-TERM CURRENT USE OF INSULIN: ICD-10-CM

## 2019-12-12 DIAGNOSIS — E11.22 TYPE 2 DIABETES MELLITUS WITH STAGE 3 CHRONIC KIDNEY DISEASE, WITHOUT LONG-TERM CURRENT USE OF INSULIN: ICD-10-CM

## 2019-12-12 PROCEDURE — 99999 PR PBB SHADOW E&M-EST. PATIENT-LVL I: ICD-10-PCS | Mod: PBBFAC,,, | Performed by: DIETITIAN, REGISTERED

## 2019-12-12 PROCEDURE — 99999 PR PBB SHADOW E&M-EST. PATIENT-LVL I: CPT | Mod: PBBFAC,,, | Performed by: DIETITIAN, REGISTERED

## 2019-12-12 PROCEDURE — G0108 PR DIAB MANAGE TRN  PER INDIV: ICD-10-PCS | Mod: S$GLB,,, | Performed by: DIETITIAN, REGISTERED

## 2019-12-12 PROCEDURE — G0108 DIAB MANAGE TRN  PER INDIV: HCPCS | Mod: S$GLB,,, | Performed by: DIETITIAN, REGISTERED

## 2020-01-13 ENCOUNTER — PATIENT OUTREACH (OUTPATIENT)
Dept: ADMINISTRATIVE | Facility: OTHER | Age: 65
End: 2020-01-13

## 2020-01-15 ENCOUNTER — CLINICAL SUPPORT (OUTPATIENT)
Dept: DIABETES | Facility: CLINIC | Age: 65
End: 2020-01-15
Payer: MEDICARE

## 2020-01-15 ENCOUNTER — TELEPHONE (OUTPATIENT)
Dept: ENDOCRINOLOGY | Facility: CLINIC | Age: 65
End: 2020-01-15

## 2020-01-15 DIAGNOSIS — E11.22 TYPE 2 DIABETES MELLITUS WITH STAGE 3 CHRONIC KIDNEY DISEASE, WITHOUT LONG-TERM CURRENT USE OF INSULIN: Primary | ICD-10-CM

## 2020-01-15 DIAGNOSIS — E11.22 TYPE 2 DIABETES MELLITUS WITH STAGE 3 CHRONIC KIDNEY DISEASE, WITHOUT LONG-TERM CURRENT USE OF INSULIN: ICD-10-CM

## 2020-01-15 DIAGNOSIS — N18.30 TYPE 2 DIABETES MELLITUS WITH STAGE 3 CHRONIC KIDNEY DISEASE, WITHOUT LONG-TERM CURRENT USE OF INSULIN: ICD-10-CM

## 2020-01-15 DIAGNOSIS — N18.30 TYPE 2 DIABETES MELLITUS WITH STAGE 3 CHRONIC KIDNEY DISEASE, WITHOUT LONG-TERM CURRENT USE OF INSULIN: Primary | ICD-10-CM

## 2020-01-15 PROCEDURE — G0108 PR DIAB MANAGE TRN  PER INDIV: ICD-10-PCS | Mod: S$GLB,,, | Performed by: DIETITIAN, REGISTERED

## 2020-01-15 PROCEDURE — 99999 PR PBB SHADOW E&M-EST. PATIENT-LVL I: ICD-10-PCS | Mod: PBBFAC,,, | Performed by: DIETITIAN, REGISTERED

## 2020-01-15 PROCEDURE — 99999 PR PBB SHADOW E&M-EST. PATIENT-LVL I: CPT | Mod: PBBFAC,,, | Performed by: DIETITIAN, REGISTERED

## 2020-01-15 PROCEDURE — G0108 DIAB MANAGE TRN  PER INDIV: HCPCS | Mod: S$GLB,,, | Performed by: DIETITIAN, REGISTERED

## 2020-01-15 NOTE — PROGRESS NOTES
Diabetes Education  Author: Melissa Silver RD, CDE  Date: 12/12/2019    Diabetes Care Management Summary  Diabetes Education Record Assessment: Initial    Current Diabetes Risk Level: Moderate     Last A1c:   Lab Results   Component Value Date    HGBA1C 9.5 (H) 11/15/2019     Last visit with Diabetes Educator: none noted    Diabetes Type : Type II  Diabetes Diagnosis: >10 years      Current Treatment: Insulin  Levemir 20 units daily;   Novolog 10 units AC            Reviewed Problem List with Patient: Yes    Health Maintenance was reviewed today with patient. Discussed with patient importance of routine eye exams, foot exams/foot care, blood work (i.e.: A1c, microalbumin, and lipid), dental visits, yearly flu vaccine, and pneumonia vaccine as indicated by PCP. Patient verbalized understanding.     Health Maintenance Topics with due status: Not Due       Topic Last Completion Date    TETANUS VACCINE 03/13/2017    Colonoscopy 05/23/2018    Lipid Panel 01/28/2019    LDCT Lung Screen 03/13/2019    Foot Exam 11/15/2019    Hemoglobin A1c 11/15/2019    Eye Exam 11/22/2019     Health Maintenance Due   Topic Date Due    Pneumococcal Vaccine (65+ High/Highest Risk) (1 of 2 - PCV13) 08/19/2019    Abdominal Aortic Aneurysm Screening  08/19/2019       Monitoring   Self Monitoring : (SMBG 1-2 times daily; always fasting; sometimes later in the day)  Blood Glucose Logs: No  Do you use a personal continuous glucose monitor?: No  In the last month, how often have you had a low blood sugar reaction?: once (used to take novolog and levemir first thing in the AM and then not eat for a while, resulting in hypos)    Exercise   Exercise Type: none    Social History  Preferred Learning Method: Face to Face, Demonstration, Hands On  Primary Support: Self  Occupation: (retired )  Smoking Status: Ex Smoker  Barriers to Change: None  Learning Challenges : None  Readiness to Learn : Acceptance  Cultural Influences: No      Diabetes  Education Assessment/Progress   Diabetes Disease Process (diabetes disease process and treatment options): Discussion, Instructed, Individual Session, Written Materials Provided, Comprehends Key Points, Needs Review  Nutrition (Incorporating nutritional management into one's lifestyle): Discussion, Instructed, Individual Session, Written Materials Provided, Comprehends Key Points, Needs Review  Physical Activity (incorporating physical activity into one's lifestyle): Discussion, Instructed, Individual Session, Written Materials Provided, Comprehends Key Points, Needs Review  Medications (states correct name, dose, onset, peak, duration, side effects & timing of meds): Discussion, Instructed, Individual Session, Written Materials Provided, Comprehends Key Points, Needs Review  Monitoring (monitoring blood glucose/other parameters & using results): Discussion, Instructed, Individual Session, Written Materials Provided, Comprehends Key Points, Needs Review  Acute Complications (preventing, detecting, and treating acute complications): Discussion, Instructed, Individual Session, Written Materials Provided, Comprehends Key Points, Needs Review  Chronic Complications (preventing, detecting, and treating chronic complications): Discussion, Instructed, Individual Session, Written Materials Provided, Comprehends Key Points, Needs Review  Clinical (diabetes, other pertinent medical history, and relevant comorbidities reviewed during visit): Discussion, Individual Session  Cognitive (knowledge of self-management skills, functional health literacy): Discussion, Individual Session  Psychosocial (emotional response to diabetes): Discussion, Individual Session  Diabetes Distress and Support Systems: Discussion, Individual Session  Behavioral (readiness for change, lifestyle practices, self-care behaviors): Discussion, Individual Session        Goals  Patient has selected/evaluated goals during today's session: Yes,  selected    Monitoring: Set (Bring BG logs to f/u appt!!!)  Start Date: 12/12/19  Target Date: 01/15/20         Diabetes Care Plan/Intervention  Education Plan/Intervention: Individual Follow-Up DSMT (1 mo f/u scheduled)      Diabetes Meal Plan  Restrictions: Restricted Carbohydrate, Low Fat, Low Sodium        Today's Self-Management Care Plan was developed with the patient's input and is based on barriers identified during today's assessment.    The long and short-term goals in the care plan were written with the patient/caregiver's input. The patient has agreed to work toward these goals to improve his overall diabetes control.      The patient received a copy of today's self-management plan and verbalized understanding of the care plan, goals, and all of today's instructions.      The patient was encouraged to communicate with his physician and care team regarding his condition(s) and treatment.  I provided the patient with my contact information today and encouraged him to contact me via phone or patient portal as needed.         Education Units of Time   Time Spent: 60 min

## 2020-01-15 NOTE — PROGRESS NOTES
Diabetes Education  Author: Melissa Silver RD, CDE  Date: 1/15/2020    Diabetes Care Management Summary  Diabetes Education Record Progress: Comprehensive  (1 mo f/u)    Current Diabetes Risk Level: Moderate     Last A1c:   Lab Results   Component Value Date    HGBA1C 9.5 (H) 11/15/2019     Last visit with Diabetes Educator: 12/12/2019    Diabetes Type : Type II  Diabetes Diagnosis: >10 years      Current Treatment: Insulin  Levemir 20 units every morning;   Novolog 10 units AC (only takes at breakfast despite eating 3 meals daily)            Reviewed Problem List with Patient: Yes    Health Maintenance was reviewed today with patient. Discussed with patient importance of routine eye exams, foot exams/foot care, blood work (i.e.: A1c, microalbumin, and lipid), dental visits, yearly flu vaccine, and pneumonia vaccine as indicated by PCP. Patient verbalized understanding.     Health Maintenance Topics with due status: Not Due       Topic Last Completion Date    TETANUS VACCINE 03/13/2017    Colonoscopy 05/23/2018    Lipid Panel 01/28/2019    LDCT Lung Screen 03/13/2019    Foot Exam 11/15/2019    Hemoglobin A1c 11/15/2019    Eye Exam 11/22/2019     Health Maintenance Due   Topic Date Due    Pneumococcal Vaccine (65+ High/Highest Risk) (1 of 2 - PCV13) 08/19/2019    Abdominal Aortic Aneurysm Screening  08/19/2019       Nutrition  Meal Planning: 3 meals per day, eats out often    Monitoring   Self Monitoring : (SMBG 1-2 times daily; FBG )  Blood Glucose Logs: Yes (see upload)  Do you use a personal continuous glucose monitor?: No  In the last month, how often have you had a low blood sugar reaction?: never    Exercise   Exercise Type: none    Social History  Preferred Learning Method: Face to Face, Demonstration, Hands On  Primary Support: Self, Spouse  Smoking Status: Ex Smoker  Barriers to Change: None  Learning Challenges : None  Readiness to Learn : Acceptance  Cultural Influences: No        Diabetes Education  Assessment/Progress  Nutrition (Incorporating nutritional management into one's lifestyle): Discussion, Instructed, Individual Session, Written Materials Provided, Comprehends Key Points, Needs Review       Reviewed general dietary recommendations. Needs more extensive diet education on following visits.    Medications (states correct name, dose, onset, peak, duration, side effects & timing of meds): Discussion, Instructed, Individual Session, Written Materials Provided, Comprehends Key Points, Needs Review       Logs reviewed with MD RAMIRO Carney - agreed that pt needs to change Levemir time to evening; needs to decrease Novolog to 8 units with meal at EVERY meal. Instructed pt to change timing of Levemir. He agrees to take Levemir every night at 5pm before he and his wife go out to dinner. Discussed need for Novolog at every meal, compromised today and encouraged pt to take at least at Breakfast AND Supper. Instructed to take 8 units prior to each of these meals. Will work further on medication compliance at next visit. He has f/u appt with me in 6 weeks. He will also be scheduled for professional CGM before that time.     Monitoring (monitoring blood glucose/other parameters & using results): Discussion, Instructed, Individual Session, Written Materials Provided, Comprehends Key Points, Needs Review       Still only testing about once daily; occasional testing later in the day. Emphasized importance of regular testing. Encouraged testing twice every day for f/o appt goal.    Acute Complications (preventing, detecting, and treating acute complications): Discussion, Instructed, Individual Session, Written Materials Provided, Comprehends Key Points, Needs Review       Reviewed true hypo vs false hypo. Pt reports he does feel hypo symptoms when <130. Discussed Goal BG's and appropriate hypo treatment when necessary.     Clinical (diabetes, other pertinent medical history, and relevant comorbidities reviewed during  visit): Discussion, Individual Session       Weight has come down a little since last visit.    Diabetes Distress and Support Systems: Discussion, Individual Session       No distress    Behavioral (readiness for change, lifestyle practices, self-care behaviors): Discussion, Individual Session      Minimal motivation, but he does continue to show up to appts; he did meet goal from last appt. High hopes!          Goals  Patient has selected/evaluated goals during today's session: Yes, evaluated, selected    Monitoring: % Met  (Bring BG logs to f/u appt!!!)  Met Percentage : 100%  (pt brought logs of all tested BGs, but still only testing once daily)  Start Date: 12/12/19  Target Date: 01/15/20    Medications: Set  (Take Novolog with breakfast AND dinner! Take Levemir at 5 pm!)  Start Date: 01/15/20  Target Date: 03/02/20           Diabetes Care Plan/Intervention  Education Plan/Intervention: Individual Follow-Up DSMT (6 week f/u scheduled for 3/2/20)      Diabetes Meal Plan  Restrictions: Restricted Carbohydrate, Low Fat, Low Sodium        Today's Self-Management Care Plan was developed with the patient's input and is based on barriers identified during today's assessment.    The long and short-term goals in the care plan were written with the patient/caregiver's input. The patient has agreed to work toward these goals to improve his overall diabetes control.      The patient received a copy of today's self-management plan and verbalized understanding of the care plan, goals, and all of today's instructions.      The patient was encouraged to communicate with his physician and care team regarding his condition(s) and treatment.  I provided the patient with my contact information today and encouraged him to contact me via phone or patient portal as needed.         Education Units of Time   Time Spent: 60 min

## 2020-01-31 DIAGNOSIS — E11.9 TYPE 2 DIABETES MELLITUS WITHOUT COMPLICATION: ICD-10-CM

## 2020-02-21 ENCOUNTER — LAB VISIT (OUTPATIENT)
Dept: LAB | Facility: HOSPITAL | Age: 65
End: 2020-02-21
Attending: SURGERY
Payer: MEDICARE

## 2020-02-21 ENCOUNTER — TELEPHONE (OUTPATIENT)
Dept: NEUROLOGY | Facility: HOSPITAL | Age: 65
End: 2020-02-21

## 2020-02-21 DIAGNOSIS — K86.1 IDIOPATHIC CHRONIC PANCREATITIS: ICD-10-CM

## 2020-02-21 DIAGNOSIS — K86.1 IDIOPATHIC CHRONIC PANCREATITIS: Primary | ICD-10-CM

## 2020-02-21 LAB
ALBUMIN SERPL BCP-MCNC: 3.3 G/DL (ref 3.5–5.2)
ALP SERPL-CCNC: 147 U/L (ref 55–135)
ALT SERPL W/O P-5'-P-CCNC: 33 U/L (ref 10–44)
ANION GAP SERPL CALC-SCNC: 7 MMOL/L (ref 8–16)
AST SERPL-CCNC: 26 U/L (ref 10–40)
BASOPHILS # BLD AUTO: 0.05 K/UL (ref 0–0.2)
BASOPHILS NFR BLD: 0.8 % (ref 0–1.9)
BILIRUB SERPL-MCNC: 0.5 MG/DL (ref 0.1–1)
BUN SERPL-MCNC: 16 MG/DL (ref 8–23)
CALCIUM SERPL-MCNC: 8.5 MG/DL (ref 8.7–10.5)
CHLORIDE SERPL-SCNC: 106 MMOL/L (ref 95–110)
CHOLEST SERPL-MCNC: 143 MG/DL (ref 120–199)
CHOLEST/HDLC SERPL: 2.9 {RATIO} (ref 2–5)
CO2 SERPL-SCNC: 25 MMOL/L (ref 23–29)
CREAT SERPL-MCNC: 1.7 MG/DL (ref 0.5–1.4)
DIFFERENTIAL METHOD: ABNORMAL
EOSINOPHIL # BLD AUTO: 0.2 K/UL (ref 0–0.5)
EOSINOPHIL NFR BLD: 2.8 % (ref 0–8)
ERYTHROCYTE [DISTWIDTH] IN BLOOD BY AUTOMATED COUNT: 13.2 % (ref 11.5–14.5)
EST. GFR  (AFRICAN AMERICAN): 47.9 ML/MIN/1.73 M^2
EST. GFR  (NON AFRICAN AMERICAN): 41.4 ML/MIN/1.73 M^2
ESTIMATED AVG GLUCOSE: 312 MG/DL (ref 68–131)
GLUCOSE SERPL-MCNC: 394 MG/DL (ref 70–110)
HBA1C MFR BLD HPLC: 12.5 % (ref 4–5.6)
HCT VFR BLD AUTO: 40.2 % (ref 40–54)
HDLC SERPL-MCNC: 49 MG/DL (ref 40–75)
HDLC SERPL: 34.3 % (ref 20–50)
HGB BLD-MCNC: 12.9 G/DL (ref 14–18)
IMM GRANULOCYTES # BLD AUTO: 0.01 K/UL (ref 0–0.04)
IMM GRANULOCYTES NFR BLD AUTO: 0.2 % (ref 0–0.5)
LDLC SERPL CALC-MCNC: 76.4 MG/DL (ref 63–159)
LYMPHOCYTES # BLD AUTO: 2.1 K/UL (ref 1–4.8)
LYMPHOCYTES NFR BLD: 32.8 % (ref 18–48)
MCH RBC QN AUTO: 29.2 PG (ref 27–31)
MCHC RBC AUTO-ENTMCNC: 32.1 G/DL (ref 32–36)
MCV RBC AUTO: 91 FL (ref 82–98)
MONOCYTES # BLD AUTO: 0.4 K/UL (ref 0.3–1)
MONOCYTES NFR BLD: 5.7 % (ref 4–15)
NEUTROPHILS # BLD AUTO: 3.7 K/UL (ref 1.8–7.7)
NEUTROPHILS NFR BLD: 57.7 % (ref 38–73)
NONHDLC SERPL-MCNC: 94 MG/DL
NRBC BLD-RTO: 0 /100 WBC
PLATELET # BLD AUTO: 145 K/UL (ref 150–350)
PMV BLD AUTO: 11.4 FL (ref 9.2–12.9)
POTASSIUM SERPL-SCNC: 4 MMOL/L (ref 3.5–5.1)
PROT SERPL-MCNC: 6.4 G/DL (ref 6–8.4)
RBC # BLD AUTO: 4.42 M/UL (ref 4.6–6.2)
SODIUM SERPL-SCNC: 138 MMOL/L (ref 136–145)
TRIGL SERPL-MCNC: 88 MG/DL (ref 30–150)
WBC # BLD AUTO: 6.37 K/UL (ref 3.9–12.7)

## 2020-02-21 PROCEDURE — 80061 LIPID PANEL: CPT

## 2020-02-21 PROCEDURE — 36415 COLL VENOUS BLD VENIPUNCTURE: CPT | Mod: PO

## 2020-02-21 PROCEDURE — 85025 COMPLETE CBC W/AUTO DIFF WBC: CPT

## 2020-02-21 PROCEDURE — 83036 HEMOGLOBIN GLYCOSYLATED A1C: CPT

## 2020-02-21 PROCEDURE — 80053 COMPREHEN METABOLIC PANEL: CPT

## 2020-02-23 ENCOUNTER — HOSPITAL ENCOUNTER (INPATIENT)
Facility: HOSPITAL | Age: 65
LOS: 9 days | Discharge: HOME HEALTH | DRG: 871 | End: 2020-03-03
Attending: EMERGENCY MEDICINE | Admitting: FAMILY MEDICINE
Payer: MEDICARE

## 2020-02-23 DIAGNOSIS — K83.8 DILATION OF BILIARY TRACT: Primary | ICD-10-CM

## 2020-02-23 DIAGNOSIS — R78.81 BACTEREMIA: ICD-10-CM

## 2020-02-23 DIAGNOSIS — E11.40 TYPE 2 DIABETES MELLITUS WITH DIABETIC NEUROPATHY, WITH LONG-TERM CURRENT USE OF INSULIN: Chronic | ICD-10-CM

## 2020-02-23 DIAGNOSIS — N17.9 AKI (ACUTE KIDNEY INJURY): ICD-10-CM

## 2020-02-23 DIAGNOSIS — Z79.4 TYPE 2 DIABETES MELLITUS WITH DIABETIC NEUROPATHY, WITH LONG-TERM CURRENT USE OF INSULIN: Chronic | ICD-10-CM

## 2020-02-23 DIAGNOSIS — K81.9 CHOLECYSTITIS: ICD-10-CM

## 2020-02-23 DIAGNOSIS — R10.31 RIGHT LOWER QUADRANT ABDOMINAL PAIN: ICD-10-CM

## 2020-02-23 DIAGNOSIS — A41.9 SEPSIS: ICD-10-CM

## 2020-02-23 DIAGNOSIS — R07.9 CHEST PAIN: ICD-10-CM

## 2020-02-23 PROBLEM — I89.0 CHRONIC ACQUIRED LYMPHEDEMA: Chronic | Status: ACTIVE | Noted: 2019-06-30

## 2020-02-23 PROBLEM — K76.0 FATTY LIVER DISEASE, NONALCOHOLIC: Chronic | Status: ACTIVE | Noted: 2019-05-09

## 2020-02-23 PROBLEM — N18.30 CHRONIC KIDNEY DISEASE, STAGE 3: Chronic | Status: ACTIVE | Noted: 2019-07-23

## 2020-02-23 PROBLEM — R73.9 HYPERGLYCEMIA: Status: ACTIVE | Noted: 2020-02-23

## 2020-02-23 PROBLEM — L03.115 CELLULITIS OF RIGHT LOWER EXTREMITY WITHOUT FOOT: Status: RESOLVED | Noted: 2019-06-26 | Resolved: 2020-02-23

## 2020-02-23 PROBLEM — I50.32 CHRONIC DIASTOLIC HEART FAILURE: Chronic | Status: ACTIVE | Noted: 2019-01-29

## 2020-02-23 LAB
ALBUMIN SERPL BCP-MCNC: 3.2 G/DL (ref 3.5–5.2)
ALP SERPL-CCNC: 394 U/L (ref 55–135)
ALT SERPL W/O P-5'-P-CCNC: 112 U/L (ref 10–44)
ANION GAP SERPL CALC-SCNC: 12 MMOL/L (ref 8–16)
AST SERPL-CCNC: 90 U/L (ref 10–40)
BASOPHILS # BLD AUTO: 0.04 K/UL (ref 0–0.2)
BASOPHILS NFR BLD: 0.2 % (ref 0–1.9)
BILIRUB SERPL-MCNC: 0.8 MG/DL (ref 0.1–1)
BUN SERPL-MCNC: 18 MG/DL (ref 8–23)
CALCIUM SERPL-MCNC: 8.6 MG/DL (ref 8.7–10.5)
CHLORIDE SERPL-SCNC: 99 MMOL/L (ref 95–110)
CO2 SERPL-SCNC: 21 MMOL/L (ref 23–29)
CREAT SERPL-MCNC: 1.7 MG/DL (ref 0.5–1.4)
DIFFERENTIAL METHOD: ABNORMAL
EOSINOPHIL # BLD AUTO: 0 K/UL (ref 0–0.5)
EOSINOPHIL NFR BLD: 0 % (ref 0–8)
ERYTHROCYTE [DISTWIDTH] IN BLOOD BY AUTOMATED COUNT: 13 % (ref 11.5–14.5)
EST. GFR  (AFRICAN AMERICAN): 48 ML/MIN/1.73 M^2
EST. GFR  (NON AFRICAN AMERICAN): 41 ML/MIN/1.73 M^2
ESTIMATED AVG GLUCOSE: 309 MG/DL (ref 68–131)
GLUCOSE SERPL-MCNC: 539 MG/DL (ref 70–110)
HBA1C MFR BLD HPLC: 12.4 % (ref 4–5.6)
HCT VFR BLD AUTO: 41.2 % (ref 40–54)
HGB BLD-MCNC: 13.7 G/DL (ref 14–18)
IMM GRANULOCYTES # BLD AUTO: 0.08 K/UL (ref 0–0.04)
IMM GRANULOCYTES NFR BLD AUTO: 0.5 % (ref 0–0.5)
INR PPP: 1 (ref 0.8–1.2)
LYMPHOCYTES # BLD AUTO: 0.7 K/UL (ref 1–4.8)
LYMPHOCYTES NFR BLD: 4.4 % (ref 18–48)
MCH RBC QN AUTO: 29.5 PG (ref 27–31)
MCHC RBC AUTO-ENTMCNC: 33.3 G/DL (ref 32–36)
MCV RBC AUTO: 89 FL (ref 82–98)
MONOCYTES # BLD AUTO: 0.8 K/UL (ref 0.3–1)
MONOCYTES NFR BLD: 5.1 % (ref 4–15)
NEUTROPHILS # BLD AUTO: 14.9 K/UL (ref 1.8–7.7)
NEUTROPHILS NFR BLD: 89.8 % (ref 38–73)
NRBC BLD-RTO: 0 /100 WBC
PLATELET # BLD AUTO: 166 K/UL (ref 150–350)
PMV BLD AUTO: 11.2 FL (ref 9.2–12.9)
POCT GLUCOSE: 260 MG/DL (ref 70–110)
POCT GLUCOSE: 308 MG/DL (ref 70–110)
POCT GLUCOSE: 388 MG/DL (ref 70–110)
POTASSIUM SERPL-SCNC: 4.1 MMOL/L (ref 3.5–5.1)
PROT SERPL-MCNC: 6.6 G/DL (ref 6–8.4)
PROTHROMBIN TIME: 11.1 SEC (ref 9–12.5)
RBC # BLD AUTO: 4.65 M/UL (ref 4.6–6.2)
SODIUM SERPL-SCNC: 132 MMOL/L (ref 136–145)
TROPONIN I SERPL DL<=0.01 NG/ML-MCNC: 0.01 NG/ML (ref 0–0.03)
WBC # BLD AUTO: 16.63 K/UL (ref 3.9–12.7)

## 2020-02-23 PROCEDURE — 96372 THER/PROPH/DIAG INJ SC/IM: CPT | Mod: 59

## 2020-02-23 PROCEDURE — G0378 HOSPITAL OBSERVATION PER HR: HCPCS

## 2020-02-23 PROCEDURE — 84484 ASSAY OF TROPONIN QUANT: CPT | Mod: 91

## 2020-02-23 PROCEDURE — 63600175 PHARM REV CODE 636 W HCPCS: Performed by: FAMILY MEDICINE

## 2020-02-23 PROCEDURE — 80053 COMPREHEN METABOLIC PANEL: CPT

## 2020-02-23 PROCEDURE — C9399 UNCLASSIFIED DRUGS OR BIOLOG: HCPCS | Performed by: FAMILY MEDICINE

## 2020-02-23 PROCEDURE — 96374 THER/PROPH/DIAG INJ IV PUSH: CPT

## 2020-02-23 PROCEDURE — 11000001 HC ACUTE MED/SURG PRIVATE ROOM

## 2020-02-23 PROCEDURE — 83036 HEMOGLOBIN GLYCOSYLATED A1C: CPT

## 2020-02-23 PROCEDURE — 93005 ELECTROCARDIOGRAM TRACING: CPT

## 2020-02-23 PROCEDURE — 25000003 PHARM REV CODE 250: Performed by: NURSE PRACTITIONER

## 2020-02-23 PROCEDURE — 25000003 PHARM REV CODE 250: Performed by: FAMILY MEDICINE

## 2020-02-23 PROCEDURE — 84484 ASSAY OF TROPONIN QUANT: CPT

## 2020-02-23 PROCEDURE — 96361 HYDRATE IV INFUSION ADD-ON: CPT

## 2020-02-23 PROCEDURE — 99285 EMERGENCY DEPT VISIT HI MDM: CPT | Mod: 25

## 2020-02-23 PROCEDURE — 63600175 PHARM REV CODE 636 W HCPCS: Performed by: EMERGENCY MEDICINE

## 2020-02-23 PROCEDURE — 96375 TX/PRO/DX INJ NEW DRUG ADDON: CPT

## 2020-02-23 PROCEDURE — 85025 COMPLETE CBC W/AUTO DIFF WBC: CPT

## 2020-02-23 PROCEDURE — 36415 COLL VENOUS BLD VENIPUNCTURE: CPT

## 2020-02-23 PROCEDURE — 85610 PROTHROMBIN TIME: CPT

## 2020-02-23 RX ORDER — GLUCAGON 1 MG
1 KIT INJECTION
Status: DISCONTINUED | OUTPATIENT
Start: 2020-02-23 | End: 2020-03-03 | Stop reason: HOSPADM

## 2020-02-23 RX ORDER — MORPHINE SULFATE 4 MG/ML
4 INJECTION, SOLUTION INTRAMUSCULAR; INTRAVENOUS
Status: COMPLETED | OUTPATIENT
Start: 2020-02-23 | End: 2020-02-23

## 2020-02-23 RX ORDER — IBUPROFEN 200 MG
16 TABLET ORAL
Status: DISCONTINUED | OUTPATIENT
Start: 2020-02-23 | End: 2020-03-03 | Stop reason: HOSPADM

## 2020-02-23 RX ORDER — INSULIN ASPART 100 [IU]/ML
0-5 INJECTION, SOLUTION INTRAVENOUS; SUBCUTANEOUS
Status: DISCONTINUED | OUTPATIENT
Start: 2020-02-23 | End: 2020-02-28

## 2020-02-23 RX ORDER — ONDANSETRON 8 MG/1
8 TABLET, ORALLY DISINTEGRATING ORAL EVERY 8 HOURS PRN
Status: DISCONTINUED | OUTPATIENT
Start: 2020-02-23 | End: 2020-03-03 | Stop reason: HOSPADM

## 2020-02-23 RX ORDER — INSULIN ASPART 100 [IU]/ML
10 INJECTION, SOLUTION INTRAVENOUS; SUBCUTANEOUS ONCE
Status: COMPLETED | OUTPATIENT
Start: 2020-02-23 | End: 2020-02-23

## 2020-02-23 RX ORDER — TALC
6 POWDER (GRAM) TOPICAL NIGHTLY PRN
Status: DISCONTINUED | OUTPATIENT
Start: 2020-02-23 | End: 2020-03-03 | Stop reason: HOSPADM

## 2020-02-23 RX ORDER — HYDROCODONE BITARTRATE AND ACETAMINOPHEN 5; 325 MG/1; MG/1
1 TABLET ORAL EVERY 8 HOURS PRN
Status: DISCONTINUED | OUTPATIENT
Start: 2020-02-23 | End: 2020-02-28

## 2020-02-23 RX ORDER — ONDANSETRON 2 MG/ML
4 INJECTION INTRAMUSCULAR; INTRAVENOUS
Status: COMPLETED | OUTPATIENT
Start: 2020-02-23 | End: 2020-02-23

## 2020-02-23 RX ORDER — METOPROLOL SUCCINATE 25 MG/1
25 TABLET, EXTENDED RELEASE ORAL DAILY
Status: DISCONTINUED | OUTPATIENT
Start: 2020-02-24 | End: 2020-02-26

## 2020-02-23 RX ORDER — SODIUM CHLORIDE 0.9 % (FLUSH) 0.9 %
10 SYRINGE (ML) INJECTION
Status: DISCONTINUED | OUTPATIENT
Start: 2020-02-23 | End: 2020-03-03 | Stop reason: HOSPADM

## 2020-02-23 RX ORDER — IBUPROFEN 200 MG
24 TABLET ORAL
Status: DISCONTINUED | OUTPATIENT
Start: 2020-02-23 | End: 2020-03-03 | Stop reason: HOSPADM

## 2020-02-23 RX ADMIN — APIXABAN 5 MG: 5 TABLET, FILM COATED ORAL at 08:02

## 2020-02-23 RX ADMIN — MORPHINE SULFATE 4 MG: 4 INJECTION INTRAVENOUS at 03:02

## 2020-02-23 RX ADMIN — PANCRELIPASE 1 CAPSULE: 36000; 180000; 114000 CAPSULE, DELAYED RELEASE PELLETS ORAL at 09:02

## 2020-02-23 RX ADMIN — HYDROCODONE BITARTRATE AND ACETAMINOPHEN 1 TABLET: 5; 325 TABLET ORAL at 08:02

## 2020-02-23 RX ADMIN — INSULIN ASPART 4 UNITS: 100 INJECTION, SOLUTION INTRAVENOUS; SUBCUTANEOUS at 08:02

## 2020-02-23 RX ADMIN — INSULIN ASPART 4 UNITS: 100 INJECTION, SOLUTION INTRAVENOUS; SUBCUTANEOUS at 06:02

## 2020-02-23 RX ADMIN — INSULIN ASPART 10 UNITS: 100 INJECTION, SOLUTION INTRAVENOUS; SUBCUTANEOUS at 02:02

## 2020-02-23 RX ADMIN — SODIUM CHLORIDE 1000 ML: 0.9 INJECTION, SOLUTION INTRAVENOUS at 01:02

## 2020-02-23 RX ADMIN — INSULIN DETEMIR 15 UNITS: 100 INJECTION, SOLUTION SUBCUTANEOUS at 08:02

## 2020-02-23 RX ADMIN — ONDANSETRON 4 MG: 2 INJECTION INTRAMUSCULAR; INTRAVENOUS at 03:02

## 2020-02-23 NOTE — SUBJECTIVE & OBJECTIVE
Past Medical History:   Diagnosis Date    Alcohol abuse     Anasarca 1/28/2019    Arthropathy associated with neurological disorder 9/2/2015    Atherosclerosis     Charcot foot due to diabetes mellitus     Chronic combined systolic and diastolic heart failure 01/29/2019 1-28-19 Left VentricleModerate decreased ejection fraction at 30%. Normal left ventricular cavity size. Normal wall thickness observed. Grade I (mild) left ventricular diastolic dysfunction consistent with impaired relaxation. Normal left atrial pressure. Moderate, global hypokinesis(see wall scoring diagram). Right VentricleNormal cavity size, wall thickness and ejection fraction. Wall motion n    Chronic pancreatitis 1/28/2019    Colon polyps     approx 5 yrs ago    Coronary artery disease due to calcified coronary lesion 05/08/2015    5 stents on ASA      Diabetic polyneuropathy associated with type 2 diabetes mellitus 9/2/2015    Diverticulosis 1/28/2019    DM type 2 with diabetic peripheral neuropathy 2/4/2019    Encounter for blood transfusion     Essential hypertension 1/28/2019    Former smoker 8/26/2015    Healed ulcer of left foot on examination 6/20/2017    Hydrocele     approx 1.5 yrs ago    Hypoalbuminemia 2/4/2019    Lymphedema of both lower extremities 1/29/2019    Mixed hyperlipidemia 5/8/2015    Morbid obesity with BMI of 50.0-59.9, adult 5/8/2015    Onychomycosis of multiple toenails with type 2 diabetes mellitus and peripheral neuropathy 6/20/2017    Perianal cyst     approx 2 yrs ago    Pseudocyst of pancreas 1/28/2019 1-28-19 Liver has a cirrhotic morphology with no focal lesions.  Significant interval increase in ascites when compared to prior exam which may account for patient's abdominal distension.  Hypodense air-fluid collection along the body of the pancreas which is slightly smaller when compared to prior CT.  Findings may relate to pancreatic necrosis with pancreatic pseudocysts with infected  pseudocyst    Skin cancer     skin cancer    Sleep apnea 8/26/2015    Status post bariatric surgery 1/11/2016    Type 2 diabetes mellitus, with long-term current use of insulin 5/8/2015       Past Surgical History:   Procedure Laterality Date    ANGIOGRAPHY N/A 6/28/2019    Procedure: ANGIOGRAM-PV STENT;  Surgeon: Ewa Diagnostic Provider;  Location: [facility] OR;  Service: Radiology;  Laterality: N/A;    ANGIOPLASTY      total x5 stents    COLONOSCOPY N/A 10/6/2015    Procedure: COLONOSCOPY;  Surgeon: Shekhar Richards MD;  Location: [facility] ENDO (2ND FLR);  Service: Endoscopy;  Laterality: N/A;  BMI over 55/2nd floor case    5 day hold Plavix, Dr Kwadwo Arroyo    CORONARY ANGIOGRAPHY Right 3/20/2019    Procedure: ANGIOGRAM, CORONARY ARTERY;  Surgeon: Bob Duque MD;  Location: [facility] CATH LAB;  Service: Cardiology;  Laterality: Right;    CORONARY ARTERY BYPASS GRAFT  2017    x3    CORONARY BYPASS GRAFT ANGIOGRAPHY  3/20/2019    Procedure: Bypass graft study;  Surgeon: Bob Duque MD;  Location: [facility] CATH LAB;  Service: Cardiology;;    CYST REMOVAL      ESOPHAGOGASTRODUODENOSCOPY N/A 7/8/2019    Procedure: ESOPHAGOGASTRODUODENOSCOPY (EGD);  Surgeon: Huan Brumfield MD;  Location: [facility] ENDO;  Service: Endoscopy;  Laterality: N/A;    GASTRECTOMY      INSERTION OF DIALYSIS CATHETER N/A 5/17/2019    Procedure: pleurx;  Surgeon: Shashank Diagnostic Provider;  Location: [facility] OR [facility] FLR;  Service: General;  Laterality: N/A;  Room 188/[facility]    KNEE ARTHROSCOPY      perianal surgery      perianal cyst removed       Review of patient's allergies indicates:  No Known Allergies    No current facility-administered medications on file prior to encounter.      Current Outpatient Medications on File Prior to Encounter   Medication Sig    apixaban (ELIQUIS) 5 mg Tab Take 1 tablet (5 mg total) by mouth 2 (two) times daily.    blood sugar diagnostic Strp Test strips to use with meter covered by insurance to check  "blood sugars twice daily. Insulin dependent diabetic.    blood-glucose meter Misc Glucometer covered by insurance to check blood sugars at home.  Insulin dependent diabetic.    cyanocobalamin, vitamin B-12, 500 mcg Subl Place 1 tablet under the tongue every Monday.     doxycycline (VIBRAMYCIN) 100 MG Cap TAKE 1 CAPSULE BY MOUTH TWICE A DAY UNTIL INFECTION IS CLEAR    ferrous sulfate (FEOSOL ORAL) Take by mouth.    furosemide (LASIX) 20 MG tablet Take 1 tablet (20 mg total) by mouth 2 (two) times daily.    glucagon, human recombinant, (GLUCAGON EMERGENCY KIT, HUMAN,) 1 mg SolR Inject 1 mg into the muscle as needed (For severely low sugar).    insulin aspart U-100 (NOVOLOG) 100 unit/mL injection Inject 10 Units into the skin 3 (three) times daily before meals.     insulin detemir U-100 (LEVEMIR FLEXTOUCH) 100 unit/mL (3 mL) SubQ InPn pen Inject 20 Units into the skin every evening.    ketoconazole (NIZORAL) 2 % cream APPLY TO FACE AND CHEST TWICE A DAY    lancets Misc Lancets to use with meter covered by insurance to check blood sugars twice daily.  Insulin dependent diabetic.    lipase-protease-amylase (CREON) 36,000-114,000- 180,000 unit CpDR Take 1 capsule by mouth 3 (three) times daily.    metoprolol succinate (TOPROL-XL) 25 MG 24 hr tablet Take 1 tablet (25 mg total) by mouth once daily.    pen needle, diabetic (INSUPEN) 32 gauge x 1/4" Ndle 1 each by Misc.(Non-Drug; Combo Route) route 4 (four) times daily.    ramelteon (ROZEREM) 8 mg tablet Take 1 tablet (8 mg total) by mouth nightly as needed for Insomnia.    triamcinolone acetonide 0.1% (KENALOG) 0.1 % cream APPLY TO AFFECTED AREA 2-3 TIMES A DAY     Family History     Problem Relation (Age of Onset)    Cancer Mother, Father, Paternal Grandfather, Brother    Diabetes Maternal Grandmother    Heart disease Father    No Known Problems Paternal Grandmother    Obesity Sister    Parkinsonism Brother    Stroke Maternal Grandfather        Tobacco Use "    Smoking status: Former Smoker     Packs/day: 2.00     Years: 30.00     Pack years: 60.00     Types: Cigarettes     Last attempt to quit: 2/1/2005     Years since quitting: 15.0    Smokeless tobacco: Never Used   Substance and Sexual Activity    Alcohol use: No     Comment: started ~2014, reports 1 shot daily, max 3 shots daily, vague about alcohol consumption. Last drink 9/2018    Drug use: No    Sexual activity: Not on file     Review of Systems   Constitutional: Negative for fever.   Respiratory: Negative for apnea, shortness of breath and wheezing.    Cardiovascular: Negative for chest pain.   Gastrointestinal: Negative for abdominal distention, diarrhea and nausea.   Genitourinary: Negative for dysuria, enuresis and hematuria.   Musculoskeletal: Negative for arthralgias and back pain.   Neurological: Negative for syncope, light-headedness and headaches.   Psychiatric/Behavioral: Negative for behavioral problems.     Objective:     Vital Signs (Most Recent):  Temp: 97.7 °F (36.5 °C) (02/23/20 1212)  Pulse: 94 (02/23/20 1501)  Resp: 20 (02/23/20 1501)  BP: (!) 169/90 (02/23/20 1501)  SpO2: 97 % (02/23/20 1501) Vital Signs (24h Range):  Temp:  [97.7 °F (36.5 °C)] 97.7 °F (36.5 °C)  Pulse:  [86-94] 94  Resp:  [11-20] 20  SpO2:  [97 %-99 %] 97 %  BP: (149-185)/(73-92) 169/90     Weight: 108.9 kg (240 lb)  Body mass index is 37.59 kg/m².    Physical Exam   Constitutional: He is oriented to person, place, and time. He appears well-developed and well-nourished.   HENT:   Head: Normocephalic and atraumatic.   Neck: Neck supple.   Cardiovascular: Normal rate, regular rhythm and normal heart sounds. Exam reveals no gallop and no friction rub.   No murmur heard.  Pulmonary/Chest: Effort normal and breath sounds normal.   Abdominal: Soft. Bowel sounds are normal. There is no tenderness.   Musculoskeletal: Normal range of motion. He exhibits no edema or tenderness.   + edema   Neurological: He is alert and oriented to  person, place, and time.   Skin: Skin is warm. Capillary refill takes less than 2 seconds.   Psychiatric: His speech is normal and behavior is normal. His mood appears not anxious. Cognition and memory are normal. He does not exhibit a depressed mood.           Significant Labs:   ABGs: No results for input(s): PH, PCO2, HCO3, POCSATURATED, BE, TOTALHB, COHB, METHB, O2HB, POCFIO2 in the last 48 hours.  Bilirubin:   Recent Labs   Lab 02/21/20  1157 02/23/20  1238   BILITOT 0.5 0.8     CBC:   Recent Labs   Lab 02/23/20  1238   WBC 16.63*   HGB 13.7*   HCT 41.2        CMP:   Recent Labs   Lab 02/23/20  1238   *   K 4.1   CL 99   CO2 21*   *   BUN 18   CREATININE 1.7*   CALCIUM 8.6*   PROT 6.6   ALBUMIN 3.2*   BILITOT 0.8   ALKPHOS 394*   AST 90*   *   ANIONGAP 12   EGFRNONAA 41*     Lactic Acid: No results for input(s): LACTATE in the last 48 hours.  Prealbumin: No results for input(s): PREALBUMIN in the last 48 hours.  Respiratory Culture: No results for input(s): GSRESP, RESPIRATORYC in the last 48 hours.  Troponin:   Recent Labs   Lab 02/23/20  1238   TROPONINI 0.012     TSH: No results for input(s): TSH in the last 4320 hours.    Significant Imaging: I have reviewed all pertinent imaging results/findings within the past 24 hours.

## 2020-02-23 NOTE — H&P
[facility] Hospital Medicine  History & Physical    Patient Name: Jian Arrieta  MRN: [id number]  Admission Date: 2/23/2020  Attending Physician: Marialuisa Ybarra MD  Primary Care Provider: Leon Barajas DO         Patient information was obtained from patient and ER records.     Subjective:     Principal Problem:Chest pain    Chief Complaint:   Chief Complaint   Patient presents with    Chest Pain     64 year old male presents to ed cc of mid sternal chest pain with radiation to back x 2 days not relieved with nitro.        HPI: Jian Arrieta is a 63 y/o M with h/o CAD, chronic pancreatitis, HTN, chronic Lymphedema, former smoker, morbid obesity, DM II, CABG in x3 years ago (2017), last stents were placed x2 years ago (2018). present with 2 days history of chest heaviness and pain located in the epigastric area, pain is about 2-3 radiating to the back. Pain is exacerbated with exertion like riding lawnmower. There is an associated right flank pain. He denies chills, fever, nausea, or vomiting.     Past Medical History:   Diagnosis Date    Alcohol abuse     Anasarca 1/28/2019    Arthropathy associated with neurological disorder 9/2/2015    Atherosclerosis     Charcot foot due to diabetes mellitus     Chronic combined systolic and diastolic heart failure 01/29/2019 1-28-19 Left VentricleModerate decreased ejection fraction at 30%. Normal left ventricular cavity size. Normal wall thickness observed. Grade I (mild) left ventricular diastolic dysfunction consistent with impaired relaxation. Normal left atrial pressure. Moderate, global hypokinesis(see wall scoring diagram). Right VentricleNormal cavity size, wall thickness and ejection fraction. Wall motion n    Chronic pancreatitis 1/28/2019    Colon polyps     approx 5 yrs ago    Coronary artery disease due to calcified coronary lesion 05/08/2015    5 stents on ASA      Diabetic polyneuropathy associated with type 2  diabetes mellitus 9/2/2015    Diverticulosis 1/28/2019    DM type 2 with diabetic peripheral neuropathy 2/4/2019    Encounter for blood transfusion     Essential hypertension 1/28/2019    Former smoker 8/26/2015    Healed ulcer of left foot on examination 6/20/2017    Hydrocele     approx 1.5 yrs ago    Hypoalbuminemia 2/4/2019    Lymphedema of both lower extremities 1/29/2019    Mixed hyperlipidemia 5/8/2015    Morbid obesity with BMI of 50.0-59.9, adult 5/8/2015    Onychomycosis of multiple toenails with type 2 diabetes mellitus and peripheral neuropathy 6/20/2017    Perianal cyst     approx 2 yrs ago    Pseudocyst of pancreas 1/28/2019 1-28-19 Liver has a cirrhotic morphology with no focal lesions.  Significant interval increase in ascites when compared to prior exam which may account for patient's abdominal distension.  Hypodense air-fluid collection along the body of the pancreas which is slightly smaller when compared to prior CT.  Findings may relate to pancreatic necrosis with pancreatic pseudocysts with infected pseudocyst    Skin cancer     skin cancer    Sleep apnea 8/26/2015    Status post bariatric surgery 1/11/2016    Type 2 diabetes mellitus, with long-term current use of insulin 5/8/2015       Past Surgical History:   Procedure Laterality Date    ANGIOGRAPHY N/A 6/28/2019    Procedure: ANGIOGRAM-PV STENT;  Surgeon: Ewa Diagnostic Provider;  Location: [facility] OR;  Service: Radiology;  Laterality: N/A;    ANGIOPLASTY      total x5 stents    COLONOSCOPY N/A 10/6/2015    Procedure: COLONOSCOPY;  Surgeon: Shekhar Richards MD;  [facility]  Service: Endoscopy;  Laterality: N/A;  BMI over 55/2nd floor case    5 day hold Plavix, Dr Kwadwo Arroyo    CORONARY ANGIOGRAPHY Right 3/20/2019    Procedure: ANGIOGRAM, CORONARY ARTERY;  Surgeon: Bob Duque MD;  Location: [facility] CATH LAB;  Service: Cardiology;  Laterality: Right;    CORONARY ARTERY BYPASS GRAFT  2017    x3     CORONARY BYPASS GRAFT ANGIOGRAPHY  3/20/2019    Procedure: Bypass graft study;  Surgeon: Bob Duque MD;  Location: [facility] CATH LAB;  Service: Cardiology;;    CYST REMOVAL      ESOPHAGOGASTRODUODENOSCOPY N/A 7/8/2019    Procedure: ESOPHAGOGASTRODUODENOSCOPY (EGD);  Surgeon: Huan Brumfield MD;  Location: [facility] ENDO;  Service: Endoscopy;  Laterality: N/A;    GASTRECTOMY      INSERTION OF DIALYSIS CATHETER N/A 5/17/2019    Procedure: pleurx;  [facility];  Location: [facility] OR [address] [place] [address];  Service: General;  Laterality: N/A;  Room 188/[facility]    KNEE ARTHROSCOPY      perianal surgery      perianal cyst removed       Review of patient's allergies indicates:  No Known Allergies    No current facility-administered medications on file prior to encounter.      Current Outpatient Medications on File Prior to Encounter   Medication Sig    apixaban (ELIQUIS) 5 mg Tab Take 1 tablet (5 mg total) by mouth 2 (two) times daily.    blood sugar diagnostic Strp Test strips to use with meter covered by insurance to check blood sugars twice daily. Insulin dependent diabetic.    blood-glucose meter Misc Glucometer covered by insurance to check blood sugars at home.  Insulin dependent diabetic.    cyanocobalamin, vitamin B-12, 500 mcg Subl Place 1 tablet under the tongue every Monday.     doxycycline (VIBRAMYCIN) 100 MG Cap TAKE 1 CAPSULE BY MOUTH TWICE A DAY UNTIL INFECTION IS CLEAR    ferrous sulfate (FEOSOL ORAL) Take by mouth.    furosemide (LASIX) 20 MG tablet Take 1 tablet (20 mg total) by mouth 2 (two) times daily.    glucagon, human recombinant, (GLUCAGON EMERGENCY KIT, HUMAN,) 1 mg SolR Inject 1 mg into the muscle as needed (For severely low sugar).    insulin aspart U-100 (NOVOLOG) 100 unit/mL injection Inject 10 Units into the skin 3 (three) times daily before meals.     insulin detemir U-100 (LEVEMIR FLEXTOUCH) 100 unit/mL (3 mL) SubQ InPn pen Inject 20 Units into the skin every  "evening.    ketoconazole (NIZORAL) 2 % cream APPLY TO FACE AND CHEST TWICE A DAY    lancets Misc Lancets to use with meter covered by insurance to check blood sugars twice daily.  Insulin dependent diabetic.    lipase-protease-amylase (CREON) 36,000-114,000- 180,000 unit CpDR Take 1 capsule by mouth 3 (three) times daily.    metoprolol succinate (TOPROL-XL) 25 MG 24 hr tablet Take 1 tablet (25 mg total) by mouth once daily.    pen needle, diabetic (INSUPEN) 32 gauge x 1/4" Ndle 1 each by Misc.(Non-Drug; Combo Route) route 4 (four) times daily.    ramelteon (ROZEREM) 8 mg tablet Take 1 tablet (8 mg total) by mouth nightly as needed for Insomnia.    triamcinolone acetonide 0.1% (KENALOG) 0.1 % cream APPLY TO AFFECTED AREA 2-3 TIMES A DAY     Family History     Problem Relation (Age of Onset)    Cancer Mother, Father, Paternal Grandfather, Brother    Diabetes Maternal Grandmother    Heart disease Father    No Known Problems Paternal Grandmother    Obesity Sister    Parkinsonism Brother    Stroke Maternal Grandfather        Tobacco Use    Smoking status: Former Smoker     Packs/day: 2.00     Years: 30.00     Pack years: 60.00     Types: Cigarettes     Last attempt to quit: 2/1/2005     Years since quitting: 15.0    Smokeless tobacco: Never Used   Substance and Sexual Activity    Alcohol use: No     Comment: started ~2014, reports 1 shot daily, max 3 shots daily, vague about alcohol consumption. Last drink 9/2018    Drug use: No    Sexual activity: Not on file     Review of Systems   Constitutional: Negative for fever.   HENT: Negative for ear discharge, sinus pressure and tinnitus.    Eyes: Negative for itching.   Respiratory: Negative for apnea, shortness of breath and wheezing.    Cardiovascular: Negative for chest pain.   Gastrointestinal: Negative for abdominal distention, diarrhea and nausea.   Endocrine: Negative for polyphagia and polyuria.   Genitourinary: Negative for dysuria, enuresis and " hematuria.   Musculoskeletal: Negative for arthralgias and back pain.   Neurological: Negative for syncope, light-headedness and headaches.   Psychiatric/Behavioral: Negative for behavioral problems, decreased concentration and sleep disturbance.     Objective:     Vital Signs (Most Recent):  Temp: 97.7 °F (36.5 °C) (02/23/20 1212)  Pulse: 94 (02/23/20 1501)  Resp: 20 (02/23/20 1501)  BP: (!) 169/90 (02/23/20 1501)  SpO2: 97 % (02/23/20 1501) Vital Signs (24h Range):  Temp:  [97.7 °F (36.5 °C)] 97.7 °F (36.5 °C)  Pulse:  [86-94] 94  Resp:  [11-20] 20  SpO2:  [97 %-99 %] 97 %  BP: (149-185)/(73-92) 169/90     Weight: 108.9 kg (240 lb)  Body mass index is 37.59 kg/m².    Physical Exam   Constitutional: He is oriented to person, place, and time. He appears well-developed and well-nourished.   HENT:   Head: Normocephalic and atraumatic.   Eyes: Pupils are equal, round, and reactive to light. EOM are normal.   Neck: Neck supple.   Cardiovascular: Normal rate, regular rhythm and normal heart sounds. Exam reveals no gallop and no friction rub.   No murmur heard.  Pulmonary/Chest: Effort normal and breath sounds normal.   Abdominal: Soft. Bowel sounds are normal. There is tenderness in the epigastric area. There is CVA tenderness.   Musculoskeletal: Normal range of motion. He exhibits tenderness. He exhibits no edema.   Neurological: He is alert and oriented to person, place, and time.   Skin: Skin is warm. Capillary refill takes less than 2 seconds.   Psychiatric: His speech is normal and behavior is normal. Thought content normal. His mood appears not anxious. Cognition and memory are normal. He does not exhibit a depressed mood.         CRANIAL NERVES     CN III, IV, VI   Pupils are equal, round, and reactive to light.  Extraocular motions are normal.        Significant Labs:   ABGs: No results for input(s): PH, PCO2, HCO3, POCSATURATED, BE, TOTALHB, COHB, METHB, O2HB, POCFIO2 in the last 48 hours.  Bilirubin:   Recent  Labs   Lab 02/21/20  1157 02/23/20  1238   BILITOT 0.5 0.8     CBC:   Recent Labs   Lab 02/23/20  1238   WBC 16.63*   HGB 13.7*   HCT 41.2        CMP:   Recent Labs   Lab 02/23/20  1238   *   K 4.1   CL 99   CO2 21*   *   BUN 18   CREATININE 1.7*   CALCIUM 8.6*   PROT 6.6   ALBUMIN 3.2*   BILITOT 0.8   ALKPHOS 394*   AST 90*   *   ANIONGAP 12   EGFRNONAA 41*     Lactic Acid: No results for input(s): LACTATE in the last 48 hours.  Prealbumin: No results for input(s): PREALBUMIN in the last 48 hours.  Respiratory Culture: No results for input(s): GSRESP, RESPIRATORYC in the last 48 hours.  Troponin:   Recent Labs   Lab 02/23/20  1238   TROPONINI 0.012     TSH: No results for input(s): TSH in the last 4320 hours.    Significant Imaging: I have reviewed all pertinent imaging results/findings within the past 24 hours.    Assessment/Plan:     * Chest pain  Coronary artery disease due to calcified coronary lesion  Trend troponin    Right lower quadrant abdominal pain  RUQ USS  PPI      Chronic acquired lymphedema  chronic      ELIEZER (acute kidney injury)  Chronic kidney disease, stage 3  Avoid nephrotoxic agents  Renally dose medication      Type 2 diabetes mellitus with diabetic neuropathy, with long-term current use of insulin  Hyperglycemia  levemir  Low dose SSI  accucheck  Diabetic diet      Chronic diastolic heart failure  Stable.      Chronic pancreatitis  Fatty liver disease, nonalcoholic  Continue Creon  Monitor       Coronary artery disease due to calcified coronary lesion  Continue Eliquis        VTE Risk Mitigation (From admission, onward)         Ordered     apixaban tablet 5 mg  2 times daily      02/23/20 1704     Place sequential compression device  Until discontinued      02/23/20 1704                   Marialuisa Ybarra MD  Department of Hospital Medicine   [facility]

## 2020-02-23 NOTE — HPI
Jian Arrieta is a 65 y/o M with h/o CAD, chronic pancreatitis, HTN, chronic Lymphedema, former smoker, morbid obesity, DM II, CABG in x3 years ago (2017), last stents were placed x2 years ago (2018). present with 2 days history of chest heaviness and pain located in the epigastric area, pain is about 2-3 radiating to the back. Pain is exacerbated with exertion like riding lawnmower. There is an associated right flank pain. He denies chills, fever, nausea, or vomiting.

## 2020-02-23 NOTE — ED PROVIDER NOTES
Encounter Date: 2/23/2020    SCRIBE #1 NOTE: I, Ivonne Bazan, am scribing for, and in the presence of,  Dr. Crabtree. I have scribed the entire note.       History     Chief Complaint   Patient presents with    Chest Pain     64 year old male presents to ed cc of mid sternal chest pain with radiation to back x 2 days not relieved with nitro.     Jian Arrieta is a 64 y.o. Male with h/o CAD who presents to the ED complaining of pressure like CP that radiates to his back for x2.5 days. Also presents with chronic right sided flank pain. States he was riding lawnmower a couple of days ago. The heaviness and pressure in his chest is exacerbated with excerstion but is not relieved by rest. Per pt's wife he has not had an appetite for x2 days. Pt had a CABG in x3 years ago (2017). Last stents were placed x2 years ago (2018). Pt is currently taking Eliquis daily. Pt also presents with chronic Lymphedema, states his swelling is currently a lot better than usual. His right leg is shorter than his left. Pt also states he believes he has developed Pancreatitis. Per pt's wife he has not drank since the onset of the believed Pancreatitis. Denies having any abdominal pain, nausea, vomiting, chills, or fever.    The history is provided by the patient and the spouse.     Review of patient's allergies indicates:  No Known Allergies  Past Medical History:   Diagnosis Date    Alcohol abuse     Anasarca 1/28/2019    Arthropathy associated with neurological disorder 9/2/2015    Atherosclerosis     Charcot foot due to diabetes mellitus     Chronic combined systolic and diastolic heart failure 01/29/2019 1-28-19 Left VentricleModerate decreased ejection fraction at 30%. Normal left ventricular cavity size. Normal wall thickness observed. Grade I (mild) left ventricular diastolic dysfunction consistent with impaired relaxation. Normal left atrial pressure. Moderate, global hypokinesis(see wall scoring diagram). Right  VentricleNormal cavity size, wall thickness and ejection fraction. Wall motion n    Chronic pancreatitis 1/28/2019    Colon polyps     approx 5 yrs ago    Coronary artery disease due to calcified coronary lesion 05/08/2015    5 stents on ASA      Diabetic polyneuropathy associated with type 2 diabetes mellitus 9/2/2015    Diverticulosis 1/28/2019    DM type 2 with diabetic peripheral neuropathy 2/4/2019    Encounter for blood transfusion     Essential hypertension 1/28/2019    Former smoker 8/26/2015    Healed ulcer of left foot on examination 6/20/2017    Hydrocele     approx 1.5 yrs ago    Hypoalbuminemia 2/4/2019    Lymphedema of both lower extremities 1/29/2019    Mixed hyperlipidemia 5/8/2015    Morbid obesity with BMI of 50.0-59.9, adult 5/8/2015    Onychomycosis of multiple toenails with type 2 diabetes mellitus and peripheral neuropathy 6/20/2017    Perianal cyst     approx 2 yrs ago    Pseudocyst of pancreas 1/28/2019 1-28-19 Liver has a cirrhotic morphology with no focal lesions.  Significant interval increase in ascites when compared to prior exam which may account for patient's abdominal distension.  Hypodense air-fluid collection along the body of the pancreas which is slightly smaller when compared to prior CT.  Findings may relate to pancreatic necrosis with pancreatic pseudocysts with infected pseudocyst    Skin cancer     skin cancer    Sleep apnea 8/26/2015    Status post bariatric surgery 1/11/2016    Type 2 diabetes mellitus, with long-term current use of insulin 5/8/2015     Past Surgical History:   Procedure Laterality Date    ANGIOGRAPHY N/A 6/28/2019    Procedure: ANGIOGRAM-PV STENT;  Surgeon: Ewa Diagnostic Provider;  Location: [facility] OR;  Service: Radiology;  Laterality: N/A;    ANGIOPLASTY      total x5 stents    COLONOSCOPY N/A 10/6/2015    Procedure: COLONOSCOPY;  Surgeon: Shekhar Richards MD;  [facility]  Service: Endoscopy;  Laterality: N/A;   BMI over 55/2nd floor case    5 day hold Plavix, Dr Kwadwo Arroyo    CORONARY ANGIOGRAPHY Right 3/20/2019    Procedure: ANGIOGRAM, CORONARY ARTERY;  Surgeon: Bob Duque MD;  Location: [facility] CATH LAB;  Service: Cardiology;  Laterality: Right;    CORONARY ARTERY BYPASS GRAFT  2017    x3    CORONARY BYPASS GRAFT ANGIOGRAPHY  3/20/2019    Procedure: Bypass graft study;  Surgeon: Bob Duque MD;  Location: [facility] CATH LAB;  Service: Cardiology;;    CYST REMOVAL      ESOPHAGOGASTRODUODENOSCOPY N/A 7/8/2019    Procedure: ESOPHAGOGASTRODUODENOSCOPY (EGD);  Surgeon: Huan Brumfield MD;  Location: [facility] ENDO;  Service: Endoscopy;  Laterality: N/A;    GASTRECTOMY      INSERTION OF DIALYSIS CATHETER N/A 5/17/2019    Procedure: pleurx;  Surgeon: [facility] Diagnostic Provider;  Location: [facility] OR [address] [place] [address];  Service: General;  Laterality: N/A;  Room 188/Sandow    KNEE ARTHROSCOPY      perianal surgery      perianal cyst removed     Family History   Problem Relation Age of Onset    Cancer Mother     Cancer Father     Heart disease Father     Obesity Sister     Parkinsonism Brother     No Known Problems Paternal Grandmother     Cancer Paternal Grandfather     Cancer Brother     Diabetes Maternal Grandmother     Stroke Maternal Grandfather     Cirrhosis Neg Hx      Social History     Tobacco Use    Smoking status: Former Smoker     Packs/day: 2.00     Years: 30.00     Pack years: 60.00     Types: Cigarettes     Last attempt to quit: 2/1/2005     Years since quitting: 15.0    Smokeless tobacco: Never Used   Substance Use Topics    Alcohol use: No     Comment: started ~2014, reports 1 shot daily, max 3 shots daily, vague about alcohol consumption. Last drink 9/2018    Drug use: No     Review of Systems   Constitutional: Positive for appetite change. Negative for fever.   HENT: Negative for sore throat.    Respiratory: Negative for shortness of breath.    Cardiovascular: Positive for chest pain and leg  swelling (Baseline.).   Gastrointestinal: Negative for nausea.   Genitourinary: Positive for flank pain. Negative for dysuria.   Musculoskeletal: Positive for back pain.   Skin: Negative for rash.   Neurological: Negative for weakness.   Hematological: Does not bruise/bleed easily.   All other systems reviewed and are negative.      Physical Exam     Initial Vitals [02/23/20 1212]   BP Pulse Resp Temp SpO2   (!) 161/87 94 20 97.7 °F (36.5 °C) 98 %      MAP       --         Physical Exam    Nursing note and vitals reviewed.  Constitutional: He appears well-developed and well-nourished. He appears distressed.   HENT:   Head: Normocephalic and atraumatic.   Eyes: EOM are normal.   Neck: Normal range of motion. Neck supple.   Cardiovascular: Normal rate, regular rhythm and normal heart sounds.   Pulmonary/Chest: Breath sounds normal. No respiratory distress. He has no wheezes.   Abdominal: Soft. He exhibits no distension. There is no tenderness.   Musculoskeletal: Normal range of motion. He exhibits no edema or tenderness.   Neurological: He is alert and oriented to person, place, and time.   Skin: Skin is warm and dry.         ED Course   Procedures  Labs Reviewed   CBC W/ AUTO DIFFERENTIAL - Abnormal; Notable for the following components:       Result Value    WBC 16.63 (*)     Hemoglobin 13.7 (*)     Gran # (ANC) 14.9 (*)     Immature Grans (Abs) 0.08 (*)     Lymph # 0.7 (*)     Gran% 89.8 (*)     Lymph% 4.4 (*)     All other components within normal limits   COMPREHENSIVE METABOLIC PANEL - Abnormal; Notable for the following components:    Sodium 132 (*)     CO2 21 (*)     Glucose 539 (*)     Creatinine 1.7 (*)     Calcium 8.6 (*)     Albumin 3.2 (*)     Alkaline Phosphatase 394 (*)     AST 90 (*)      (*)     eGFR if  48 (*)     eGFR if non  41 (*)     All other components within normal limits    Narrative:     glu   critical result(s) called and verbal readback obtained from     Petra rn by CMCINDY 02/23/2020 13:03   TROPONIN I   PROTIME-INR     EKG Readings: (Independently Interpreted)   Rhythm: Normal Sinus Rhythm.   Left axis deviation  T wave abnormality       Imaging Results          X-Ray Chest PA And Lateral (Final result)  Result time 02/23/20 13:24:37    Final result by Vin Landa DO (02/23/20 13:24:37)                 Impression:      See above      Electronically signed by: Vin Landa DO  Date:    02/23/2020  Time:    13:24             Narrative:    EXAMINATION:  XR CHEST PA AND LATERAL    CLINICAL HISTORY:  Chest pain, unspecified    TECHNIQUE:  PA and lateral views of the chest were performed.    COMPARISON:  None    FINDINGS:  Interval removal of right-sided PICC line.  Scattered sternotomy wires and surgical clips overlie the cardiomediastinal silhouette unchanged.  The lungs are clear.  There is no pleural effusion or pneumothorax.  Nonspecific elevation right lung base similar to prior.  Heart size within normal limits.  Visualized osseous structures grossly intact.                                 Medical Decision Making:   Clinical Tests:   Lab Tests: Ordered and Reviewed  Radiological Study: Ordered and Reviewed            Scribe Attestation:   Scribe #1: I performed the above scribed service and the documentation accurately describes the services I performed. I attest to the accuracy of the note.                          Clinical Impression:     1. Chest pain                                Jian Crabtree MD  02/25/20 0916

## 2020-02-24 PROBLEM — K83.8 DILATION OF BILIARY TRACT: Status: ACTIVE | Noted: 2020-02-24

## 2020-02-24 LAB
ALBUMIN SERPL BCP-MCNC: 2.9 G/DL (ref 3.5–5.2)
ALP SERPL-CCNC: 292 U/L (ref 55–135)
ALT SERPL W/O P-5'-P-CCNC: 70 U/L (ref 10–44)
ANION GAP SERPL CALC-SCNC: 7 MMOL/L (ref 8–16)
AST SERPL-CCNC: 38 U/L (ref 10–40)
BASOPHILS # BLD AUTO: 0.03 K/UL (ref 0–0.2)
BASOPHILS NFR BLD: 0.2 % (ref 0–1.9)
BILIRUB SERPL-MCNC: 0.8 MG/DL (ref 0.1–1)
BUN SERPL-MCNC: 21 MG/DL (ref 8–23)
CALCIUM SERPL-MCNC: 8.6 MG/DL (ref 8.7–10.5)
CHLORIDE SERPL-SCNC: 102 MMOL/L (ref 95–110)
CO2 SERPL-SCNC: 27 MMOL/L (ref 23–29)
CREAT SERPL-MCNC: 1.4 MG/DL (ref 0.5–1.4)
DIFFERENTIAL METHOD: ABNORMAL
EOSINOPHIL # BLD AUTO: 0 K/UL (ref 0–0.5)
EOSINOPHIL NFR BLD: 0.2 % (ref 0–8)
ERYTHROCYTE [DISTWIDTH] IN BLOOD BY AUTOMATED COUNT: 13.4 % (ref 11.5–14.5)
EST. GFR  (AFRICAN AMERICAN): >60 ML/MIN/1.73 M^2
EST. GFR  (NON AFRICAN AMERICAN): 52 ML/MIN/1.73 M^2
GLUCOSE SERPL-MCNC: 129 MG/DL (ref 70–110)
HCT VFR BLD AUTO: 41.6 % (ref 40–54)
HGB BLD-MCNC: 13.8 G/DL (ref 14–18)
IMM GRANULOCYTES # BLD AUTO: 0.14 K/UL (ref 0–0.04)
IMM GRANULOCYTES NFR BLD AUTO: 0.7 % (ref 0–0.5)
LYMPHOCYTES # BLD AUTO: 1.7 K/UL (ref 1–4.8)
LYMPHOCYTES NFR BLD: 8.9 % (ref 18–48)
MAGNESIUM SERPL-MCNC: 1.8 MG/DL (ref 1.6–2.6)
MCH RBC QN AUTO: 29.1 PG (ref 27–31)
MCHC RBC AUTO-ENTMCNC: 33.2 G/DL (ref 32–36)
MCV RBC AUTO: 88 FL (ref 82–98)
MONOCYTES # BLD AUTO: 1.3 K/UL (ref 0.3–1)
MONOCYTES NFR BLD: 6.7 % (ref 4–15)
NEUTROPHILS # BLD AUTO: 15.7 K/UL (ref 1.8–7.7)
NEUTROPHILS NFR BLD: 83.3 % (ref 38–73)
NRBC BLD-RTO: 0 /100 WBC
PHOSPHATE SERPL-MCNC: 1.8 MG/DL (ref 2.7–4.5)
PLATELET # BLD AUTO: 181 K/UL (ref 150–350)
PMV BLD AUTO: 11.3 FL (ref 9.2–12.9)
POCT GLUCOSE: 154 MG/DL (ref 70–110)
POCT GLUCOSE: 180 MG/DL (ref 70–110)
POCT GLUCOSE: 301 MG/DL (ref 70–110)
POCT GLUCOSE: 99 MG/DL (ref 70–110)
POTASSIUM SERPL-SCNC: 3.9 MMOL/L (ref 3.5–5.1)
PROT SERPL-MCNC: 6.3 G/DL (ref 6–8.4)
RBC # BLD AUTO: 4.75 M/UL (ref 4.6–6.2)
SODIUM SERPL-SCNC: 136 MMOL/L (ref 136–145)
WBC # BLD AUTO: 18.81 K/UL (ref 3.9–12.7)

## 2020-02-24 PROCEDURE — 96361 HYDRATE IV INFUSION ADD-ON: CPT

## 2020-02-24 PROCEDURE — G0378 HOSPITAL OBSERVATION PER HR: HCPCS

## 2020-02-24 PROCEDURE — 99214 OFFICE O/P EST MOD 30 MIN: CPT | Mod: ,,, | Performed by: INTERNAL MEDICINE

## 2020-02-24 PROCEDURE — 25000003 PHARM REV CODE 250: Performed by: FAMILY MEDICINE

## 2020-02-24 PROCEDURE — 63600175 PHARM REV CODE 636 W HCPCS: Performed by: FAMILY MEDICINE

## 2020-02-24 PROCEDURE — 80053 COMPREHEN METABOLIC PANEL: CPT

## 2020-02-24 PROCEDURE — 11000001 HC ACUTE MED/SURG PRIVATE ROOM

## 2020-02-24 PROCEDURE — 84100 ASSAY OF PHOSPHORUS: CPT

## 2020-02-24 PROCEDURE — 36415 COLL VENOUS BLD VENIPUNCTURE: CPT

## 2020-02-24 PROCEDURE — 85025 COMPLETE CBC W/AUTO DIFF WBC: CPT

## 2020-02-24 PROCEDURE — 96372 THER/PROPH/DIAG INJ SC/IM: CPT | Mod: 59

## 2020-02-24 PROCEDURE — 83735 ASSAY OF MAGNESIUM: CPT

## 2020-02-24 PROCEDURE — 99214 PR OFFICE/OUTPT VISIT, EST, LEVL IV, 30-39 MIN: ICD-10-PCS | Mod: ,,, | Performed by: INTERNAL MEDICINE

## 2020-02-24 PROCEDURE — 25000003 PHARM REV CODE 250: Performed by: NURSE PRACTITIONER

## 2020-02-24 RX ORDER — SODIUM CHLORIDE 9 MG/ML
INJECTION, SOLUTION INTRAVENOUS CONTINUOUS
Status: DISCONTINUED | OUTPATIENT
Start: 2020-02-24 | End: 2020-02-26

## 2020-02-24 RX ADMIN — METOPROLOL SUCCINATE 25 MG: 25 TABLET, FILM COATED, EXTENDED RELEASE ORAL at 04:02

## 2020-02-24 RX ADMIN — APIXABAN 5 MG: 5 TABLET, FILM COATED ORAL at 09:02

## 2020-02-24 RX ADMIN — SODIUM CHLORIDE: 0.9 INJECTION, SOLUTION INTRAVENOUS at 03:02

## 2020-02-24 RX ADMIN — HYDROCODONE BITARTRATE AND ACETAMINOPHEN 1 TABLET: 5; 325 TABLET ORAL at 10:02

## 2020-02-24 RX ADMIN — HYDROCODONE BITARTRATE AND ACETAMINOPHEN 1 TABLET: 5; 325 TABLET ORAL at 07:02

## 2020-02-24 RX ADMIN — INSULIN ASPART 2 UNITS: 100 INJECTION, SOLUTION INTRAVENOUS; SUBCUTANEOUS at 09:02

## 2020-02-24 RX ADMIN — APIXABAN 5 MG: 5 TABLET, FILM COATED ORAL at 04:02

## 2020-02-24 RX ADMIN — SODIUM CHLORIDE 500 ML: 0.9 INJECTION, SOLUTION INTRAVENOUS at 09:02

## 2020-02-24 NOTE — ASSESSMENT & PLAN NOTE
This seems chronic but potentially mildly worse.  LFTs do not fit pattern of biliary obstruction but the ALP is slightly elevated.    Recs:  Check GGT, will order  MRI abd with contrast + MRCP to eval  Trend LFTs     May need to consider surgery consult given gallbladder findings, but will await MRI imaging.  - pt would like to be seen br Dr. Rowe team if surgery is consulted.

## 2020-02-24 NOTE — NURSING
Patient arrived to unit via wheelchair from ER. AAOx4. VSS. Oriented to room. Denies of chest pain. Stated of mild back pain at this time. Tele box on. IV 20 G to right FA. No swelling, edema, or bleeding noted. Dressing CDI. Safety initiated and maintained. Fall risk reviewed with patient. Patient verbal reported of understanding. Bed in lowest position. Bed alarm on. Call ash within reach and educated to call for assistance. Will continue to monitor and endorse to night nurse.

## 2020-02-24 NOTE — NURSING
"Cued into patient's room for evening rounds. Patient stated "I am sleeping." I apologized.  Unable to do education at this time and patient wants to sleep. Side rails x2 up with head of bed elevated 30 degrees. Bed alarm is activated and is on for patient safety. Will revisit room as time allows  "

## 2020-02-24 NOTE — PLAN OF CARE
02/24/20 1644   AREVALO Message   Medicare Outpatient and Observation Notification regarding financial responsibility Given to patient/caregiver;Explained to patient/caregiver;Signed/date by patient/caregiver   Date AREVALO was signed 02/24/20   Time AREVALO was signed [id number]

## 2020-02-24 NOTE — ASSESSMENT & PLAN NOTE
Fatty liver disease, nonalcoholic  Continue Creon  Monitor   Abdominal USS reported mildly dilated extrahepatic duct without intrahepatic dilatation.  Although, this appears increased from most recent liver Doppler from July 2019, overall findings appear similar to that of comparison CT from June 2019.  Further evaluation with dedicated MRI with MRCP or ERCP could be performed for further evaluation.  Additionally, correlation with biliary serology recommended  Consult GI

## 2020-02-24 NOTE — H&P (VIEW-ONLY)
[facility]  Gastroenterology  Consult Note    Patient Name: Jian Arrieta  MRN: [id number]  Admission Date: 2/23/2020  Hospital Length of Stay: 0 days  Code Status: Full Code   Attending Provider: Marialuisa Ybarra*   Consulting Provider: Tanner Mitchell MD  Primary Care Physician: Leon Barajas DO  Principal Problem:Chest pain    Consults  Subjective:     HPI:  This is a 65 y/o male hx of chronic panc, etoh abuse, DM, CABG, hx of ascites (but no evidence of cirrhosis on pathology), hx of portal vein stenosis s/p balloon dilation with Dr. Rowe who presented to ER with chest heaviness.  Patient with 2-3 days of epigastric and substernal pain.  It did radiate to the right upper quadrant and somewhat around to the right flank.  He states he was using his riding mower and this seemed to exacerbate the symptoms.  He did have an episode of vomiting as well. There is no jaundice or icterus.  There is no pruritus.  GI consulted for imaging findings of extrahepatic biliary ductal dilatation.    U/s done today showing:  Partially fluid-filled gallbladder with aggregated sludge.  Nonspecific borderline wall thickening potentially reactive in the setting of underlying hepatocellular dysfunction though inflammatory process not entirely excluded.  Mildly dilated extrahepatic duct without intrahepatic dilatation.  Although, this appears increased from most recent liver Doppler from July 2019, overall findings appear similar to that of comparison CT from June 2019.  Further evaluation with dedicated MRI with MRCP or ERCP could be performed for further evaluation.  Additionally, correlation with biliary serology recommended.  Mild perihepatic ascites.    10/2015 colon: dr barrow  Normal, repeat 5 years    7/2019 egd  [facility]  Impression:           - Normal esophagus.                        - Erosive gastropathy. Biopsied. Rule out portal                         hypertensive gastropathy vs. H  pylori                        - Normal duodenal bulb.                        - 64yo M with cirrhosis underwent EGD for                         evaluation of anemia and esophageal variceal                         screening. No varices were seen. Suspect anemia is                         in part secondary to transient oozing from portal                         hypertensive gastropathy (it was biopsied)  Path - normal    Past Medical History:   Diagnosis Date    Alcohol abuse     Anasarca 1/28/2019    Arthropathy associated with neurological disorder 9/2/2015    Atherosclerosis     Charcot foot due to diabetes mellitus     Chronic combined systolic and diastolic heart failure 01/29/2019 1-28-19 Left VentricleModerate decreased ejection fraction at 30%. Normal left ventricular cavity size. Normal wall thickness observed. Grade I (mild) left ventricular diastolic dysfunction consistent with impaired relaxation. Normal left atrial pressure. Moderate, global hypokinesis(see wall scoring diagram). Right VentricleNormal cavity size, wall thickness and ejection fraction. Wall motion n    Chronic pancreatitis 1/28/2019    Colon polyps     approx 5 yrs ago    Coronary artery disease due to calcified coronary lesion 05/08/2015    5 stents on ASA      Diabetic polyneuropathy associated with type 2 diabetes mellitus 9/2/2015    Diverticulosis 1/28/2019    DM type 2 with diabetic peripheral neuropathy 2/4/2019    Encounter for blood transfusion     Essential hypertension 1/28/2019    Former smoker 8/26/2015    Healed ulcer of left foot on examination 6/20/2017    Hydrocele     approx 1.5 yrs ago    Hypoalbuminemia 2/4/2019    Lymphedema of both lower extremities 1/29/2019    Mixed hyperlipidemia 5/8/2015    Morbid obesity with BMI of 50.0-59.9, adult 5/8/2015    Onychomycosis of multiple toenails with type 2 diabetes mellitus and peripheral neuropathy 6/20/2017    Perianal cyst     approx 2 yrs ago     Pseudocyst of pancreas 1/28/2019 1-28-19 Liver has a cirrhotic morphology with no focal lesions.  Significant interval increase in ascites when compared to prior exam which may account for patient's abdominal distension.  Hypodense air-fluid collection along the body of the pancreas which is slightly smaller when compared to prior CT.  Findings may relate to pancreatic necrosis with pancreatic pseudocysts with infected pseudocyst    Skin cancer     skin cancer    Sleep apnea 8/26/2015    Status post bariatric surgery 1/11/2016    Type 2 diabetes mellitus, with long-term current use of insulin 5/8/2015       Past Surgical History:   Procedure Laterality Date    ANGIOGRAPHY N/A 6/28/2019    Procedure: ANGIOGRAM-PV STENT;  Surgeon: [facility] Diagnostic Provider;  Location: [facility] OR;  Service: Radiology;  Laterality: N/A;    ANGIOPLASTY      total x5 stents    COLONOSCOPY N/A 10/6/2015    Procedure: COLONOSCOPY;  Surgeon: Shekhar Richards MD;  Location: [facility] ENDO (2ND FLR);  Service: Endoscopy;  Laterality: N/A;  BMI over 55/2nd floor case    5 day hold Plavix, Dr Kwadwo Arroyo    CORONARY ANGIOGRAPHY Right 3/20/2019    Procedure: ANGIOGRAM, CORONARY ARTERY;  Surgeon: Bob Duque MD;  Location: [facility] CATH LAB;  Service: Cardiology;  Laterality: Right;    CORONARY ARTERY BYPASS GRAFT  2017    x3    CORONARY BYPASS GRAFT ANGIOGRAPHY  3/20/2019    Procedure: Bypass graft study;  Surgeon: Bob Duque MD;  Location: [facility] CATH LAB;  Service: Cardiology;;    CYST REMOVAL      ESOPHAGOGASTRODUODENOSCOPY N/A 7/8/2019    Procedure: ESOPHAGOGASTRODUODENOSCOPY (EGD);  Surgeon: Huan Brumfield MD;  Location: [facility] ENDO;  Service: Endoscopy;  Laterality: N/A;    GASTRECTOMY      INSERTION OF DIALYSIS CATHETER N/A 5/17/2019    Procedure: pleurx;  Surgeon: [facility] Diagnostic Provider;  Location: [facility] OR 2ND FLR;  Service: General;  Laterality: N/A;  Room 188/[facility]ow    KNEE ARTHROSCOPY      perianal surgery       perianal cyst removed       Review of patient's allergies indicates:  No Known Allergies  Family History     Problem Relation (Age of Onset)    Cancer Mother, Father, Paternal Grandfather, Brother    Diabetes Maternal Grandmother    Heart disease Father    No Known Problems Paternal Grandmother    Obesity Sister    Parkinsonism Brother    Stroke Maternal Grandfather        Tobacco Use    Smoking status: Former Smoker     Packs/day: 2.00     Years: 30.00     Pack years: 60.00     Types: Cigarettes     Last attempt to quit: 2/1/2005     Years since quitting: 15.0    Smokeless tobacco: Never Used   Substance and Sexual Activity    Alcohol use: No     Comment: started ~2014, reports 1 shot daily, max 3 shots daily, vague about alcohol consumption. Last drink 9/2018    Drug use: No    Sexual activity: Not on file     Review of Systems   Constitutional: Positive for activity change and fatigue. Negative for chills and fever.   HENT: Negative for facial swelling, mouth sores and trouble swallowing.    Eyes: Negative for pain and redness.   Respiratory: Negative for cough and shortness of breath.    Cardiovascular: Positive for chest pain. Negative for leg swelling.   Gastrointestinal: Positive for abdominal pain and vomiting. Negative for diarrhea.   Genitourinary: Negative for dysuria and hematuria.   Musculoskeletal: Negative for gait problem and neck stiffness.   Skin: Negative for rash and wound.   Neurological: Negative for seizures and headaches.   Psychiatric/Behavioral: Negative for confusion and hallucinations.     Objective:     Vital Signs (Most Recent):  Temp: 98.1 °F (36.7 °C) (02/24/20 1228)  Pulse: 83 (02/24/20 1228)  Resp: 17 (02/24/20 1228)  BP: (!) 119/59 (02/24/20 1228)  SpO2: 97 % (02/24/20 1228) Vital Signs (24h Range):  Temp:  [96.4 °F (35.8 °C)-99.3 °F (37.4 °C)] 98.1 °F (36.7 °C)  Pulse:  [77-95] 83  Resp:  [17-19] 17  SpO2:  [95 %-99 %] 97 %  BP: (107-141)/(57-67) 119/59     Weight: 108.9 kg  (240 lb) (02/23/20 1212)  Body mass index is 38.74 kg/m².    No intake or output data in the 24 hours ending 02/24/20 1535    Lines/Drains/Airways     Peripheral Intravenous Line                 Peripheral IV - Single Lumen 08/23/19 1319 20 G Left Hand 185 days         Peripheral IV - Single Lumen 02/23/20 1327 20 G Right Antecubital 1 day                Physical Exam   Constitutional: He is oriented to person, place, and time. He appears well-developed and well-nourished. No distress.   HENT:   Head: Normocephalic and atraumatic.   Eyes: Conjunctivae are normal. No scleral icterus.   Neck: Neck supple. No thyromegaly present.   Cardiovascular: Normal rate, regular rhythm and intact distal pulses.   Pulmonary/Chest: Effort normal and breath sounds normal. No respiratory distress.   Abdominal:   Mild subcostal RUQ pain ; non rigid ; no guarding   Musculoskeletal: Normal range of motion. He exhibits no tenderness.   Neurological: He is alert and oriented to person, place, and time.   Skin: Skin is warm and dry. No rash noted.   Psychiatric: He has a normal mood and affect. His behavior is normal.   Vitals reviewed.      Significant Labs:  Amylase: No results for input(s): AMYLASE in the last 48 hours.  Blood Culture: No results for input(s): LABBLOO in the last 48 hours.  CBC:   Recent Labs   Lab 02/23/20  1238 02/24/20  0542   WBC 16.63* 18.81*   HGB 13.7* 13.8*   HCT 41.2 41.6    181     CMP:   Recent Labs   Lab 02/24/20  0542   *   CALCIUM 8.6*   ALBUMIN 2.9*   PROT 6.3      K 3.9   CO2 27      BUN 21   CREATININE 1.4   ALKPHOS 292*   ALT 70*   AST 38   BILITOT 0.8     Coagulation:   Recent Labs   Lab 02/23/20  1238   INR 1.0     CRP: No results for input(s): CRP in the last 48 hours.  ESR: No results for input(s): SEDRATE in the last 48 hours.  H.Pylori Ab IgG: No results for input(s): HPYLORIIGG in the last 48 hours.  Lipase: No results for input(s): LIPASE in the last 48  hours.    Significant Imaging:  U/S: I have reviewed all results within the past 24 hours and my personal findings are:  stable to increased biliary dilatation    Assessment/Plan:     Dilation of biliary tract  This seems chronic but potentially mildly worse.  LFTs do not fit pattern of biliary obstruction but the ALP is slightly elevated.    Recs:  Check GGT, will order  MRI abd with contrast + MRCP to eval  Trend LFTs     May need to consider surgery consult given gallbladder findings, but will await MRI imaging.  - pt would like to be seen br Dr. Rowe team if surgery is consulted.        Thank you for your consult. I will follow-up with patient. Please contact us if you have any additional questions.    Tanner Mitchell MD  Gastroenterology  [facility]

## 2020-02-24 NOTE — HPI
This is a 63 y/o male hx of chronic panc, etoh abuse, DM, CABG, hx of ascites (but no evidence of cirrhosis on pathology), hx of portal vein stenosis s/p balloon dilation with Dr. Rowe who presented to ER with chest heaviness.  Patient with 2-3 days of epigastric and substernal pain.  It did radiate to the right upper quadrant and somewhat around to the right flank.  He states he was using his riding mower and this seemed to exacerbate the symptoms.  He did have an episode of vomiting as well. There is no jaundice or icterus.  There is no pruritus.  GI consulted for imaging findings of extrahepatic biliary ductal dilatation.    U/s done today showing:  Partially fluid-filled gallbladder with aggregated sludge.  Nonspecific borderline wall thickening potentially reactive in the setting of underlying hepatocellular dysfunction though inflammatory process not entirely excluded.  Mildly dilated extrahepatic duct without intrahepatic dilatation.  Although, this appears increased from most recent liver Doppler from July 2019, overall findings appear similar to that of comparison CT from June 2019.  Further evaluation with dedicated MRI with MRCP or ERCP could be performed for further evaluation.  Additionally, correlation with biliary serology recommended.  Mild perihepatic ascites.    10/2015 colon: dr barrow  Normal, repeat 5 years    7/2019 egd  [facility]  Impression:           - Normal esophagus.                        - Erosive gastropathy. Biopsied. Rule out portal                         hypertensive gastropathy vs. H pylori                        - Normal duodenal bulb.                        - 62yo M with cirrhosis underwent EGD for                         evaluation of anemia and esophageal variceal                         screening. No varices were seen. Suspect anemia is                         in part secondary to transient oozing from portal                         hypertensive gastropathy (it was  biopsied)  Path - normal

## 2020-02-24 NOTE — PLAN OF CARE
VN cued into patients room for admission questions rounding. VN role explained and informed pt and wife at bedside that VN will be working alongside the bedside care team throughout the day. Pt verbalized understanding that VN is available for any questions and education, and nurse and PCT will continue hourly rounding at bedside. Dr. Garcia informed that pt and wife wants Dr. SUZI Rowe consulted as he has followed pt for abd pain previously. Radiology called and informed of STAT US kidneys ordered. Fall education provided. Allotted time given for questions - all questions answered. Will continue to monitor and intervene PRN.

## 2020-02-24 NOTE — CONSULTS
[facility]  Gastroenterology  Consult Note    Patient Name: Jian Arrieta  MRN: [id number]  Admission Date: 2/23/2020  Hospital Length of Stay: 0 days  Code Status: Full Code   Attending Provider: Marialuisa Ybarra*   Consulting Provider: Tanner Mitchell MD  Primary Care Physician: Leon Barajas DO  Principal Problem:Chest pain    Consults  Subjective:     HPI:  This is a 63 y/o male hx of chronic panc, etoh abuse, DM, CABG, hx of ascites (but no evidence of cirrhosis on pathology), hx of portal vein stenosis s/p balloon dilation with Dr. Rowe who presented to ER with chest heaviness.  Patient with 2-3 days of epigastric and substernal pain.  It did radiate to the right upper quadrant and somewhat around to the right flank.  He states he was using his riding mower and this seemed to exacerbate the symptoms.  He did have an episode of vomiting as well. There is no jaundice or icterus.  There is no pruritus.  GI consulted for imaging findings of extrahepatic biliary ductal dilatation.    U/s done today showing:  Partially fluid-filled gallbladder with aggregated sludge.  Nonspecific borderline wall thickening potentially reactive in the setting of underlying hepatocellular dysfunction though inflammatory process not entirely excluded.  Mildly dilated extrahepatic duct without intrahepatic dilatation.  Although, this appears increased from most recent liver Doppler from July 2019, overall findings appear similar to that of comparison CT from June 2019.  Further evaluation with dedicated MRI with MRCP or ERCP could be performed for further evaluation.  Additionally, correlation with biliary serology recommended.  Mild perihepatic ascites.    10/2015 colon: dr barrow  Normal, repeat 5 years    7/2019 egd  [facility]  Impression:           - Normal esophagus.                        - Erosive gastropathy. Biopsied. Rule out portal                         hypertensive gastropathy vs. H  pylori                        - Normal duodenal bulb.                        - 64yo M with cirrhosis underwent EGD for                         evaluation of anemia and esophageal variceal                         screening. No varices were seen. Suspect anemia is                         in part secondary to transient oozing from portal                         hypertensive gastropathy (it was biopsied)  Path - normal    Past Medical History:   Diagnosis Date    Alcohol abuse     Anasarca 1/28/2019    Arthropathy associated with neurological disorder 9/2/2015    Atherosclerosis     Charcot foot due to diabetes mellitus     Chronic combined systolic and diastolic heart failure 01/29/2019 1-28-19 Left VentricleModerate decreased ejection fraction at 30%. Normal left ventricular cavity size. Normal wall thickness observed. Grade I (mild) left ventricular diastolic dysfunction consistent with impaired relaxation. Normal left atrial pressure. Moderate, global hypokinesis(see wall scoring diagram). Right VentricleNormal cavity size, wall thickness and ejection fraction. Wall motion n    Chronic pancreatitis 1/28/2019    Colon polyps     approx 5 yrs ago    Coronary artery disease due to calcified coronary lesion 05/08/2015    5 stents on ASA      Diabetic polyneuropathy associated with type 2 diabetes mellitus 9/2/2015    Diverticulosis 1/28/2019    DM type 2 with diabetic peripheral neuropathy 2/4/2019    Encounter for blood transfusion     Essential hypertension 1/28/2019    Former smoker 8/26/2015    Healed ulcer of left foot on examination 6/20/2017    Hydrocele     approx 1.5 yrs ago    Hypoalbuminemia 2/4/2019    Lymphedema of both lower extremities 1/29/2019    Mixed hyperlipidemia 5/8/2015    Morbid obesity with BMI of 50.0-59.9, adult 5/8/2015    Onychomycosis of multiple toenails with type 2 diabetes mellitus and peripheral neuropathy 6/20/2017    Perianal cyst     approx 2 yrs ago     Pseudocyst of pancreas 1/28/2019 1-28-19 Liver has a cirrhotic morphology with no focal lesions.  Significant interval increase in ascites when compared to prior exam which may account for patient's abdominal distension.  Hypodense air-fluid collection along the body of the pancreas which is slightly smaller when compared to prior CT.  Findings may relate to pancreatic necrosis with pancreatic pseudocysts with infected pseudocyst    Skin cancer     skin cancer    Sleep apnea 8/26/2015    Status post bariatric surgery 1/11/2016    Type 2 diabetes mellitus, with long-term current use of insulin 5/8/2015       Past Surgical History:   Procedure Laterality Date    ANGIOGRAPHY N/A 6/28/2019    Procedure: ANGIOGRAM-PV STENT;  Surgeon: [facility] Diagnostic Provider;  Location: [facility] OR;  Service: Radiology;  Laterality: N/A;    ANGIOPLASTY      total x5 stents    COLONOSCOPY N/A 10/6/2015    Procedure: COLONOSCOPY;  Surgeon: Shekhar Richards MD;  Location: [facility] ENDO (2ND FLR);  Service: Endoscopy;  Laterality: N/A;  BMI over 55/2nd floor case    5 day hold Plavix, Dr Kwadwo Arroyo    CORONARY ANGIOGRAPHY Right 3/20/2019    Procedure: ANGIOGRAM, CORONARY ARTERY;  Surgeon: Bob Duque MD;  Location: [facility] CATH LAB;  Service: Cardiology;  Laterality: Right;    CORONARY ARTERY BYPASS GRAFT  2017    x3    CORONARY BYPASS GRAFT ANGIOGRAPHY  3/20/2019    Procedure: Bypass graft study;  Surgeon: Bob Duque MD;  Location: [facility] CATH LAB;  Service: Cardiology;;    CYST REMOVAL      ESOPHAGOGASTRODUODENOSCOPY N/A 7/8/2019    Procedure: ESOPHAGOGASTRODUODENOSCOPY (EGD);  Surgeon: Huan Brumfield MD;  Location: [facility] ENDO;  Service: Endoscopy;  Laterality: N/A;    GASTRECTOMY      INSERTION OF DIALYSIS CATHETER N/A 5/17/2019    Procedure: pleurx;  Surgeon: [facility] Diagnostic Provider;  Location: [facility] OR 2ND FLR;  Service: General;  Laterality: N/A;  Room 188/[place]    KNEE ARTHROSCOPY      perianal surgery       perianal cyst removed       Review of patient's allergies indicates:  No Known Allergies  Family History     Problem Relation (Age of Onset)    Cancer Mother, Father, Paternal Grandfather, Brother    Diabetes Maternal Grandmother    Heart disease Father    No Known Problems Paternal Grandmother    Obesity Sister    Parkinsonism Brother    Stroke Maternal Grandfather        Tobacco Use    Smoking status: Former Smoker     Packs/day: 2.00     Years: 30.00     Pack years: 60.00     Types: Cigarettes     Last attempt to quit: 2/1/2005     Years since quitting: 15.0    Smokeless tobacco: Never Used   Substance and Sexual Activity    Alcohol use: No     Comment: started ~2014, reports 1 shot daily, max 3 shots daily, vague about alcohol consumption. Last drink 9/2018    Drug use: No    Sexual activity: Not on file     Review of Systems   Constitutional: Positive for activity change and fatigue. Negative for chills and fever.   HENT: Negative for facial swelling, mouth sores and trouble swallowing.    Eyes: Negative for pain and redness.   Respiratory: Negative for cough and shortness of breath.    Cardiovascular: Positive for chest pain. Negative for leg swelling.   Gastrointestinal: Positive for abdominal pain and vomiting. Negative for diarrhea.   Genitourinary: Negative for dysuria and hematuria.   Musculoskeletal: Negative for gait problem and neck stiffness.   Skin: Negative for rash and wound.   Neurological: Negative for seizures and headaches.   Psychiatric/Behavioral: Negative for confusion and hallucinations.     Objective:     Vital Signs (Most Recent):  Temp: 98.1 °F (36.7 °C) (02/24/20 1228)  Pulse: 83 (02/24/20 1228)  Resp: 17 (02/24/20 1228)  BP: (!) 119/59 (02/24/20 1228)  SpO2: 97 % (02/24/20 1228) Vital Signs (24h Range):  Temp:  [96.4 °F (35.8 °C)-99.3 °F (37.4 °C)] 98.1 °F (36.7 °C)  Pulse:  [77-95] 83  Resp:  [17-19] 17  SpO2:  [95 %-99 %] 97 %  BP: (107-141)/(57-67) 119/59     Weight: 108.9 kg  (240 lb) (02/23/20 1212)  Body mass index is 38.74 kg/m².    No intake or output data in the 24 hours ending 02/24/20 1535    Lines/Drains/Airways     Peripheral Intravenous Line                 Peripheral IV - Single Lumen 08/23/19 1319 20 G Left Hand 185 days         Peripheral IV - Single Lumen 02/23/20 1327 20 G Right Antecubital 1 day                Physical Exam   Constitutional: He is oriented to person, place, and time. He appears well-developed and well-nourished. No distress.   HENT:   Head: Normocephalic and atraumatic.   Eyes: Conjunctivae are normal. No scleral icterus.   Neck: Neck supple. No thyromegaly present.   Cardiovascular: Normal rate, regular rhythm and intact distal pulses.   Pulmonary/Chest: Effort normal and breath sounds normal. No respiratory distress.   Abdominal:   Mild subcostal RUQ pain ; non rigid ; no guarding   Musculoskeletal: Normal range of motion. He exhibits no tenderness.   Neurological: He is alert and oriented to person, place, and time.   Skin: Skin is warm and dry. No rash noted.   Psychiatric: He has a normal mood and affect. His behavior is normal.   Vitals reviewed.      Significant Labs:  Amylase: No results for input(s): AMYLASE in the last 48 hours.  Blood Culture: No results for input(s): LABBLOO in the last 48 hours.  CBC:   Recent Labs   Lab 02/23/20  1238 02/24/20  0542   WBC 16.63* 18.81*   HGB 13.7* 13.8*   HCT 41.2 41.6    181     CMP:   Recent Labs   Lab 02/24/20  0542   *   CALCIUM 8.6*   ALBUMIN 2.9*   PROT 6.3      K 3.9   CO2 27      BUN 21   CREATININE 1.4   ALKPHOS 292*   ALT 70*   AST 38   BILITOT 0.8     Coagulation:   Recent Labs   Lab 02/23/20  1238   INR 1.0     CRP: No results for input(s): CRP in the last 48 hours.  ESR: No results for input(s): SEDRATE in the last 48 hours.  H.Pylori Ab IgG: No results for input(s): HPYLORIIGG in the last 48 hours.  Lipase: No results for input(s): LIPASE in the last 48  hours.    Significant Imaging:  U/S: I have reviewed all results within the past 24 hours and my personal findings are:  stable to increased biliary dilatation    Assessment/Plan:     Dilation of biliary tract  This seems chronic but potentially mildly worse.  LFTs do not fit pattern of biliary obstruction but the ALP is slightly elevated.    Recs:  Check GGT, will order  MRI abd with contrast + MRCP to eval  Trend LFTs     May need to consider surgery consult given gallbladder findings, but will await MRI imaging.  - pt would like to be seen br Dr. Rowe team if surgery is consulted.        Thank you for your consult. I will follow-up with patient. Please contact us if you have any additional questions.    Tanner Mitchell MD  Gastroenterology  [facility]

## 2020-02-24 NOTE — PROGRESS NOTES
[facility] Hospital Medicine  Progress Note    Patient Name: Jian Arrieta  MRN: [id number]  Patient Class: OP- Observation   Admission Date: 2/23/2020  Length of Stay: 0 days  Attending Physician: Marialuisa Ybarra*  Primary Care Provider: Leon Barajas DO        Subjective:     Principal Problem:Chest pain        HPI:  Jian Arrieta is a 65 y/o M with h/o CAD, chronic pancreatitis, HTN, chronic Lymphedema, former smoker, morbid obesity, DM II, CABG in x3 years ago (2017), last stents were placed x2 years ago (2018). present with 2 days history of chest heaviness and pain located in the epigastric area, pain is about 2-3 radiating to the back. Pain is exacerbated with exertion like riding lawnmower. There is an associated right flank pain. He denies chills, fever, nausea, or vomiting.     Overview/Hospital Course:  No notes on file    Past Medical History:   Diagnosis Date    Alcohol abuse     Anasarca 1/28/2019    Arthropathy associated with neurological disorder 9/2/2015    Atherosclerosis     Charcot foot due to diabetes mellitus     Chronic combined systolic and diastolic heart failure 01/29/2019 1-28-19 Left VentricleModerate decreased ejection fraction at 30%. Normal left ventricular cavity size. Normal wall thickness observed. Grade I (mild) left ventricular diastolic dysfunction consistent with impaired relaxation. Normal left atrial pressure. Moderate, global hypokinesis(see wall scoring diagram). Right VentricleNormal cavity size, wall thickness and ejection fraction. Wall motion n    Chronic pancreatitis 1/28/2019    Colon polyps     approx 5 yrs ago    Coronary artery disease due to calcified coronary lesion 05/08/2015    5 stents on ASA      Diabetic polyneuropathy associated with type 2 diabetes mellitus 9/2/2015    Diverticulosis 1/28/2019    DM type 2 with diabetic peripheral neuropathy 2/4/2019    Encounter for blood transfusion     Essential  hypertension 1/28/2019    Former smoker 8/26/2015    Healed ulcer of left foot on examination 6/20/2017    Hydrocele     approx 1.5 yrs ago    Hypoalbuminemia 2/4/2019    Lymphedema of both lower extremities 1/29/2019    Mixed hyperlipidemia 5/8/2015    Morbid obesity with BMI of 50.0-59.9, adult 5/8/2015    Onychomycosis of multiple toenails with type 2 diabetes mellitus and peripheral neuropathy 6/20/2017    Perianal cyst     approx 2 yrs ago    Pseudocyst of pancreas 1/28/2019 1-28-19 Liver has a cirrhotic morphology with no focal lesions.  Significant interval increase in ascites when compared to prior exam which may account for patient's abdominal distension.  Hypodense air-fluid collection along the body of the pancreas which is slightly smaller when compared to prior CT.  Findings may relate to pancreatic necrosis with pancreatic pseudocysts with infected pseudocyst    Skin cancer     skin cancer    Sleep apnea 8/26/2015    Status post bariatric surgery 1/11/2016    Type 2 diabetes mellitus, with long-term current use of insulin 5/8/2015       Past Surgical History:   Procedure Laterality Date    ANGIOGRAPHY N/A 6/28/2019    Procedure: ANGIOGRAM-PV STENT;  Surgeon: Ewa Diagnostic Provider;  Location: [facility] OR;  Service: Radiology;  Laterality: N/A;    ANGIOPLASTY      total x5 stents    COLONOSCOPY N/A 10/6/2015    Procedure: COLONOSCOPY;  Surgeon: Shekhar Richards MD;  Location: [facility] ENDO ([address] [place] [address]);  Service: Endoscopy;  Laterality: N/A;  BMI over 55/2nd floor case    5 day hold Plavix, Dr Kwadwo Arroyo    CORONARY ANGIOGRAPHY Right 3/20/2019    Procedure: ANGIOGRAM, CORONARY ARTERY;  Surgeon: Bob Duque MD;  Location: [facility] CATH LAB;  Service: Cardiology;  Laterality: Right;    CORONARY ARTERY BYPASS GRAFT  2017    x3    CORONARY BYPASS GRAFT ANGIOGRAPHY  3/20/2019    Procedure: Bypass graft study;  Surgeon: Bob Duque MD;  Location: [facility] CATH LAB;  Service: Cardiology;;     CYST REMOVAL      ESOPHAGOGASTRODUODENOSCOPY N/A 7/8/2019    Procedure: ESOPHAGOGASTRODUODENOSCOPY (EGD);  Surgeon: Huan Brumfield MD;  [facility];  Service: Endoscopy;  Laterality: N/A;    GASTRECTOMY      INSERTION OF DIALYSIS CATHETER N/A 5/17/2019    Procedure: pleurx;  Surgeon: Ewa Diagnostic Provider;  Location: [facility] OR [address] [place] [address];  Service: General;  Laterality: N/A;  Room 188/[place]    KNEE ARTHROSCOPY      perianal surgery      perianal cyst removed       Review of patient's allergies indicates:  No Known Allergies    No current facility-administered medications on file prior to encounter.      Current Outpatient Medications on File Prior to Encounter   Medication Sig    apixaban (ELIQUIS) 5 mg Tab Take 1 tablet (5 mg total) by mouth 2 (two) times daily.    blood sugar diagnostic Strp Test strips to use with meter covered by insurance to check blood sugars twice daily. Insulin dependent diabetic.    blood-glucose meter Misc Glucometer covered by insurance to check blood sugars at home.  Insulin dependent diabetic.    cyanocobalamin, vitamin B-12, 500 mcg Subl Place 1 tablet under the tongue every Monday.     doxycycline (VIBRAMYCIN) 100 MG Cap TAKE 1 CAPSULE BY MOUTH TWICE A DAY UNTIL INFECTION IS CLEAR    ferrous sulfate (FEOSOL ORAL) Take by mouth.    furosemide (LASIX) 20 MG tablet Take 1 tablet (20 mg total) by mouth 2 (two) times daily.    glucagon, human recombinant, (GLUCAGON EMERGENCY KIT, HUMAN,) 1 mg SolR Inject 1 mg into the muscle as needed (For severely low sugar).    insulin aspart U-100 (NOVOLOG) 100 unit/mL injection Inject 10 Units into the skin 3 (three) times daily before meals.     insulin detemir U-100 (LEVEMIR FLEXTOUCH) 100 unit/mL (3 mL) SubQ InPn pen Inject 20 Units into the skin every evening.    ketoconazole (NIZORAL) 2 % cream APPLY TO FACE AND CHEST TWICE A DAY    lancets Misc Lancets to use with meter covered by insurance to check blood sugars  "twice daily.  Insulin dependent diabetic.    lipase-protease-amylase (CREON) 36,000-114,000- 180,000 unit CpDR Take 1 capsule by mouth 3 (three) times daily.    metoprolol succinate (TOPROL-XL) 25 MG 24 hr tablet Take 1 tablet (25 mg total) by mouth once daily.    pen needle, diabetic (INSUPEN) 32 gauge x 1/4" Ndle 1 each by Misc.(Non-Drug; Combo Route) route 4 (four) times daily.    ramelteon (ROZEREM) 8 mg tablet Take 1 tablet (8 mg total) by mouth nightly as needed for Insomnia.    triamcinolone acetonide 0.1% (KENALOG) 0.1 % cream APPLY TO AFFECTED AREA 2-3 TIMES A DAY     Family History     Problem Relation (Age of Onset)    Cancer Mother, Father, Paternal Grandfather, Brother    Diabetes Maternal Grandmother    Heart disease Father    No Known Problems Paternal Grandmother    Obesity Sister    Parkinsonism Brother    Stroke Maternal Grandfather        Tobacco Use    Smoking status: Former Smoker     Packs/day: 2.00     Years: 30.00     Pack years: 60.00     Types: Cigarettes     Last attempt to quit: 2/1/2005     Years since quitting: 15.0    Smokeless tobacco: Never Used   Substance and Sexual Activity    Alcohol use: No     Comment: started ~2014, reports 1 shot daily, max 3 shots daily, vague about alcohol consumption. Last drink 9/2018    Drug use: No    Sexual activity: Not on file     Review of Systems   Constitutional: Negative for fever.   Respiratory: Negative for apnea, shortness of breath and wheezing.    Cardiovascular: Negative for chest pain.   Gastrointestinal: Negative for abdominal distention, diarrhea and nausea.   Genitourinary: Negative for dysuria, enuresis and hematuria.   Musculoskeletal: Negative for arthralgias and back pain.   Neurological: Negative for syncope, light-headedness and headaches.   Psychiatric/Behavioral: Negative for behavioral problems.     Objective:     Vital Signs (Most Recent):  Temp: 97.7 °F (36.5 °C) (02/23/20 1212)  Pulse: 94 (02/23/20 1501)  Resp: 20 " (02/23/20 1501)  BP: (!) 169/90 (02/23/20 1501)  SpO2: 97 % (02/23/20 1501) Vital Signs (24h Range):  Temp:  [97.7 °F (36.5 °C)] 97.7 °F (36.5 °C)  Pulse:  [86-94] 94  Resp:  [11-20] 20  SpO2:  [97 %-99 %] 97 %  BP: (149-185)/(73-92) 169/90     Weight: 108.9 kg (240 lb)  Body mass index is 37.59 kg/m².    Physical Exam   Constitutional: He is oriented to person, place, and time. He appears well-developed and well-nourished.   HENT:   Head: Normocephalic and atraumatic.   Neck: Neck supple.   Cardiovascular: Normal rate, regular rhythm and normal heart sounds. Exam reveals no gallop and no friction rub.   No murmur heard.  Pulmonary/Chest: Effort normal and breath sounds normal.   Abdominal: Soft. Bowel sounds are normal. There is no tenderness.   Musculoskeletal: Normal range of motion. He exhibits no edema or tenderness.   + edema   Neurological: He is alert and oriented to person, place, and time.   Skin: Skin is warm. Capillary refill takes less than 2 seconds.   Psychiatric: His speech is normal and behavior is normal. His mood appears not anxious. Cognition and memory are normal. He does not exhibit a depressed mood.           Significant Labs:   ABGs: No results for input(s): PH, PCO2, HCO3, POCSATURATED, BE, TOTALHB, COHB, METHB, O2HB, POCFIO2 in the last 48 hours.  Bilirubin:   Recent Labs   Lab 02/21/20  1157 02/23/20  1238   BILITOT 0.5 0.8     CBC:   Recent Labs   Lab 02/23/20  1238   WBC 16.63*   HGB 13.7*   HCT 41.2        CMP:   Recent Labs   Lab 02/23/20  1238   *   K 4.1   CL 99   CO2 21*   *   BUN 18   CREATININE 1.7*   CALCIUM 8.6*   PROT 6.6   ALBUMIN 3.2*   BILITOT 0.8   ALKPHOS 394*   AST 90*   *   ANIONGAP 12   EGFRNONAA 41*     Lactic Acid: No results for input(s): LACTATE in the last 48 hours.  Prealbumin: No results for input(s): PREALBUMIN in the last 48 hours.  Respiratory Culture: No results for input(s): GSRESP, RESPIRATORYC in the last 48 hours.  Troponin:    Recent Labs   Lab 02/23/20  1238   TROPONINI 0.012     TSH: No results for input(s): TSH in the last 4320 hours.    Significant Imaging: I have reviewed all pertinent imaging results/findings within the past 24 hours.      Assessment/Plan:      * Chest pain  Coronary artery disease due to calcified coronary lesion  Trend troponin    Right lower quadrant abdominal pain  RUQ USS  PPI      Chronic acquired lymphedema  chronic      ELIEZER (acute kidney injury)  Chronic kidney disease, stage 3  Avoid nephrotoxic agents  Renally dose medication      Type 2 diabetes mellitus with diabetic neuropathy, with long-term current use of insulin  Hyperglycemia  levemir  Low dose SSI  accucheck  Diabetic diet      Chronic diastolic heart failure  Stable.      Chronic pancreatitis  Fatty liver disease, nonalcoholic  Continue Creon  Monitor   Abdominal USS reported mildly dilated extrahepatic duct without intrahepatic dilatation.  Although, this appears increased from most recent liver Doppler from July 2019, overall findings appear similar to that of comparison CT from June 2019.  Further evaluation with dedicated MRI with MRCP or ERCP could be performed for further evaluation.  Additionally, correlation with biliary serology recommended  Consult GI    Coronary artery disease due to calcified coronary lesion  Continue Eliquis        VTE Risk Mitigation (From admission, onward)         Ordered     apixaban tablet 5 mg  2 times daily      02/23/20 1704     Place sequential compression device  Until discontinued      02/23/20 1704                      Marialuisa N MD Tate  Department of Hospital Medicine   [facility]

## 2020-02-24 NOTE — PLAN OF CARE
VN rounds: VN cued into pt's room with pt's permission. Pt resting in bed. VN instructed pt to call for assistance. Pt aware and agreeable. Allowed time for questions. Pt is requesting something to eat and also requesting bengay cream for his back. VN notified MD. INNA noted. Will cont to be available as needed.

## 2020-02-25 ENCOUNTER — PATIENT MESSAGE (OUTPATIENT)
Dept: NEUROLOGY | Facility: HOSPITAL | Age: 65
End: 2020-02-25

## 2020-02-25 LAB
ALBUMIN SERPL BCP-MCNC: 2.5 G/DL (ref 3.5–5.2)
ALP SERPL-CCNC: 268 U/L (ref 55–135)
ALT SERPL W/O P-5'-P-CCNC: 41 U/L (ref 10–44)
ANION GAP SERPL CALC-SCNC: 10 MMOL/L (ref 8–16)
ANISOCYTOSIS BLD QL SMEAR: SLIGHT
AST SERPL-CCNC: 26 U/L (ref 10–40)
BACTERIA #/AREA URNS HPF: ABNORMAL /HPF
BASOPHILS # BLD AUTO: 0.02 K/UL (ref 0–0.2)
BASOPHILS # BLD AUTO: 0.04 K/UL (ref 0–0.2)
BASOPHILS NFR BLD: 0.2 % (ref 0–1.9)
BASOPHILS NFR BLD: 0.3 % (ref 0–1.9)
BILIRUB SERPL-MCNC: 0.6 MG/DL (ref 0.1–1)
BILIRUB UR QL STRIP: NEGATIVE
BUN SERPL-MCNC: 23 MG/DL (ref 8–23)
BURR CELLS BLD QL SMEAR: ABNORMAL
CALCIUM SERPL-MCNC: 7.8 MG/DL (ref 8.7–10.5)
CHLORIDE SERPL-SCNC: 104 MMOL/L (ref 95–110)
CLARITY UR: CLEAR
CO2 SERPL-SCNC: 19 MMOL/L (ref 23–29)
COLOR UR: YELLOW
CREAT SERPL-MCNC: 1.4 MG/DL (ref 0.5–1.4)
DACRYOCYTES BLD QL SMEAR: ABNORMAL
DIFFERENTIAL METHOD: ABNORMAL
DIFFERENTIAL METHOD: ABNORMAL
EOSINOPHIL # BLD AUTO: 0.1 K/UL (ref 0–0.5)
EOSINOPHIL # BLD AUTO: 4.6 K/UL (ref 0–0.5)
EOSINOPHIL NFR BLD: 0.5 % (ref 0–8)
EOSINOPHIL NFR BLD: 45 % (ref 0–8)
ERYTHROCYTE [DISTWIDTH] IN BLOOD BY AUTOMATED COUNT: 13.2 % (ref 11.5–14.5)
ERYTHROCYTE [DISTWIDTH] IN BLOOD BY AUTOMATED COUNT: 13.2 % (ref 11.5–14.5)
EST. GFR  (AFRICAN AMERICAN): >60 ML/MIN/1.73 M^2
EST. GFR  (NON AFRICAN AMERICAN): 52 ML/MIN/1.73 M^2
GGT SERPL-CCNC: 134 U/L (ref 8–55)
GLUCOSE SERPL-MCNC: 281 MG/DL (ref 70–110)
GLUCOSE UR QL STRIP: ABNORMAL
HCT VFR BLD AUTO: 35.6 % (ref 40–54)
HCT VFR BLD AUTO: 38.1 % (ref 40–54)
HGB BLD-MCNC: 11.5 G/DL (ref 14–18)
HGB BLD-MCNC: 12.5 G/DL (ref 14–18)
HGB UR QL STRIP: ABNORMAL
HYALINE CASTS #/AREA URNS LPF: 3 /LPF
IMM GRANULOCYTES # BLD AUTO: 0.1 K/UL (ref 0–0.04)
IMM GRANULOCYTES # BLD AUTO: 0.22 K/UL (ref 0–0.04)
IMM GRANULOCYTES NFR BLD AUTO: 0.8 % (ref 0–0.5)
IMM GRANULOCYTES NFR BLD AUTO: 2.2 % (ref 0–0.5)
INR PPP: 1 (ref 0.8–1.2)
KETONES UR QL STRIP: NEGATIVE
LACTATE SERPL-SCNC: 4.7 MMOL/L (ref 0.5–2.2)
LACTATE SERPL-SCNC: 4.8 MMOL/L (ref 0.5–2.2)
LEUKOCYTE ESTERASE UR QL STRIP: NEGATIVE
LIPASE SERPL-CCNC: 4 U/L (ref 4–60)
LYMPHOCYTES # BLD AUTO: 0.2 K/UL (ref 1–4.8)
LYMPHOCYTES # BLD AUTO: 1 K/UL (ref 1–4.8)
LYMPHOCYTES NFR BLD: 2.2 % (ref 18–48)
LYMPHOCYTES NFR BLD: 8 % (ref 18–48)
MAGNESIUM SERPL-MCNC: 1.7 MG/DL (ref 1.6–2.6)
MCH RBC QN AUTO: 29.2 PG (ref 27–31)
MCH RBC QN AUTO: 29.4 PG (ref 27–31)
MCHC RBC AUTO-ENTMCNC: 32.3 G/DL (ref 32–36)
MCHC RBC AUTO-ENTMCNC: 32.8 G/DL (ref 32–36)
MCV RBC AUTO: 89 FL (ref 82–98)
MCV RBC AUTO: 91 FL (ref 82–98)
MICROSCOPIC COMMENT: ABNORMAL
MONOCYTES # BLD AUTO: 0.2 K/UL (ref 0.3–1)
MONOCYTES # BLD AUTO: 0.8 K/UL (ref 0.3–1)
MONOCYTES NFR BLD: 1.8 % (ref 4–15)
MONOCYTES NFR BLD: 6.1 % (ref 4–15)
NEUTROPHILS # BLD AUTO: 11 K/UL (ref 1.8–7.7)
NEUTROPHILS # BLD AUTO: 5 K/UL (ref 1.8–7.7)
NEUTROPHILS NFR BLD: 48.6 % (ref 38–73)
NEUTROPHILS NFR BLD: 84.3 % (ref 38–73)
NITRITE UR QL STRIP: NEGATIVE
NRBC BLD-RTO: 0 /100 WBC
NRBC BLD-RTO: 0 /100 WBC
PH UR STRIP: 5 [PH] (ref 5–8)
PHOSPHATE SERPL-MCNC: 2.3 MG/DL (ref 2.7–4.5)
PLATELET # BLD AUTO: 114 K/UL (ref 150–350)
PLATELET # BLD AUTO: 149 K/UL (ref 150–350)
PLATELET BLD QL SMEAR: ABNORMAL
PMV BLD AUTO: 11.1 FL (ref 9.2–12.9)
PMV BLD AUTO: 11.7 FL (ref 9.2–12.9)
POCT GLUCOSE: 173 MG/DL (ref 70–110)
POCT GLUCOSE: 176 MG/DL (ref 70–110)
POCT GLUCOSE: 259 MG/DL (ref 70–110)
POCT GLUCOSE: 279 MG/DL (ref 70–110)
POCT GLUCOSE: 379 MG/DL (ref 70–110)
POIKILOCYTOSIS BLD QL SMEAR: SLIGHT
POLYCHROMASIA BLD QL SMEAR: ABNORMAL
POTASSIUM SERPL-SCNC: 4.7 MMOL/L (ref 3.5–5.1)
PROCALCITONIN SERPL IA-MCNC: 4.57 NG/ML
PROT SERPL-MCNC: 5.7 G/DL (ref 6–8.4)
PROT UR QL STRIP: ABNORMAL
PROTHROMBIN TIME: 10.9 SEC (ref 9–12.5)
RBC # BLD AUTO: 3.91 M/UL (ref 4.6–6.2)
RBC # BLD AUTO: 4.28 M/UL (ref 4.6–6.2)
RBC #/AREA URNS HPF: 5 /HPF (ref 0–4)
SODIUM SERPL-SCNC: 133 MMOL/L (ref 136–145)
SP GR UR STRIP: 1.02 (ref 1–1.03)
SQUAMOUS #/AREA URNS HPF: 2 /HPF
URN SPEC COLLECT METH UR: ABNORMAL
UROBILINOGEN UR STRIP-ACNC: NEGATIVE EU/DL
WBC # BLD AUTO: 10.18 K/UL (ref 3.9–12.7)
WBC # BLD AUTO: 13.04 K/UL (ref 3.9–12.7)
WBC #/AREA URNS HPF: 5 /HPF (ref 0–5)

## 2020-02-25 PROCEDURE — 96365 THER/PROPH/DIAG IV INF INIT: CPT | Mod: 59

## 2020-02-25 PROCEDURE — 83605 ASSAY OF LACTIC ACID: CPT

## 2020-02-25 PROCEDURE — 96372 THER/PROPH/DIAG INJ SC/IM: CPT | Mod: 59

## 2020-02-25 PROCEDURE — 94761 N-INVAS EAR/PLS OXIMETRY MLT: CPT

## 2020-02-25 PROCEDURE — 63600175 PHARM REV CODE 636 W HCPCS: Performed by: FAMILY MEDICINE

## 2020-02-25 PROCEDURE — 25000003 PHARM REV CODE 250: Performed by: NURSE PRACTITIONER

## 2020-02-25 PROCEDURE — G0378 HOSPITAL OBSERVATION PER HR: HCPCS

## 2020-02-25 PROCEDURE — 36415 COLL VENOUS BLD VENIPUNCTURE: CPT

## 2020-02-25 PROCEDURE — 93010 ELECTROCARDIOGRAM REPORT: CPT | Mod: ,,, | Performed by: INTERNAL MEDICINE

## 2020-02-25 PROCEDURE — 83735 ASSAY OF MAGNESIUM: CPT

## 2020-02-25 PROCEDURE — 85025 COMPLETE CBC W/AUTO DIFF WBC: CPT

## 2020-02-25 PROCEDURE — 80053 COMPREHEN METABOLIC PANEL: CPT

## 2020-02-25 PROCEDURE — 84100 ASSAY OF PHOSPHORUS: CPT

## 2020-02-25 PROCEDURE — 93005 ELECTROCARDIOGRAM TRACING: CPT

## 2020-02-25 PROCEDURE — 25000003 PHARM REV CODE 250: Performed by: FAMILY MEDICINE

## 2020-02-25 PROCEDURE — 20000000 HC ICU ROOM

## 2020-02-25 PROCEDURE — 85610 PROTHROMBIN TIME: CPT

## 2020-02-25 PROCEDURE — 87040 BLOOD CULTURE FOR BACTERIA: CPT

## 2020-02-25 PROCEDURE — 96376 TX/PRO/DX INJ SAME DRUG ADON: CPT

## 2020-02-25 PROCEDURE — 93010 EKG 12-LEAD: ICD-10-PCS | Mod: ,,, | Performed by: INTERNAL MEDICINE

## 2020-02-25 PROCEDURE — 96361 HYDRATE IV INFUSION ADD-ON: CPT

## 2020-02-25 PROCEDURE — 83690 ASSAY OF LIPASE: CPT

## 2020-02-25 PROCEDURE — 84145 PROCALCITONIN (PCT): CPT

## 2020-02-25 PROCEDURE — 87077 CULTURE AEROBIC IDENTIFY: CPT | Mod: 59

## 2020-02-25 PROCEDURE — 82977 ASSAY OF GGT: CPT

## 2020-02-25 PROCEDURE — 87186 SC STD MICRODIL/AGAR DIL: CPT | Mod: 59

## 2020-02-25 PROCEDURE — 96375 TX/PRO/DX INJ NEW DRUG ADDON: CPT

## 2020-02-25 PROCEDURE — 81000 URINALYSIS NONAUTO W/SCOPE: CPT

## 2020-02-25 RX ORDER — VANCOMYCIN HCL IN 5 % DEXTROSE 1G/250ML
1000 PLASTIC BAG, INJECTION (ML) INTRAVENOUS
Status: DISCONTINUED | OUTPATIENT
Start: 2020-02-26 | End: 2020-02-26

## 2020-02-25 RX ORDER — IBUPROFEN 400 MG/1
400 TABLET ORAL EVERY 8 HOURS PRN
Status: DISCONTINUED | OUTPATIENT
Start: 2020-02-25 | End: 2020-03-03

## 2020-02-25 RX ORDER — ONDANSETRON 2 MG/ML
8 INJECTION INTRAMUSCULAR; INTRAVENOUS EVERY 6 HOURS PRN
Status: DISCONTINUED | OUTPATIENT
Start: 2020-02-25 | End: 2020-03-03 | Stop reason: HOSPADM

## 2020-02-25 RX ORDER — ACETAMINOPHEN 325 MG/1
650 TABLET ORAL EVERY 6 HOURS PRN
Status: DISCONTINUED | OUTPATIENT
Start: 2020-02-25 | End: 2020-03-03 | Stop reason: HOSPADM

## 2020-02-25 RX ORDER — NOREPINEPHRINE BITARTRATE/D5W 8 MG/250ML
0.02 PLASTIC BAG, INJECTION (ML) INTRAVENOUS CONTINUOUS
Status: DISCONTINUED | OUTPATIENT
Start: 2020-02-25 | End: 2020-03-01

## 2020-02-25 RX ADMIN — SODIUM CHLORIDE: 0.9 INJECTION, SOLUTION INTRAVENOUS at 01:02

## 2020-02-25 RX ADMIN — INSULIN ASPART 3 UNITS: 100 INJECTION, SOLUTION INTRAVENOUS; SUBCUTANEOUS at 06:02

## 2020-02-25 RX ADMIN — APIXABAN 5 MG: 5 TABLET, FILM COATED ORAL at 09:02

## 2020-02-25 RX ADMIN — ONDANSETRON 8 MG: 8 TABLET, ORALLY DISINTEGRATING ORAL at 11:02

## 2020-02-25 RX ADMIN — SODIUM CHLORIDE, SODIUM LACTATE, POTASSIUM CHLORIDE, AND CALCIUM CHLORIDE 3195 ML: .6; .31; .03; .02 INJECTION, SOLUTION INTRAVENOUS at 09:02

## 2020-02-25 RX ADMIN — Medication 0.02 MCG/KG/MIN: at 11:02

## 2020-02-25 RX ADMIN — INSULIN DETEMIR 15 UNITS: 100 INJECTION, SOLUTION SUBCUTANEOUS at 08:02

## 2020-02-25 RX ADMIN — SODIUM CHLORIDE: 0.9 INJECTION, SOLUTION INTRAVENOUS at 11:02

## 2020-02-25 RX ADMIN — HYDROCODONE BITARTRATE AND ACETAMINOPHEN 1 TABLET: 5; 325 TABLET ORAL at 11:02

## 2020-02-25 RX ADMIN — VANCOMYCIN HYDROCHLORIDE 2000 MG: 1 INJECTION, POWDER, LYOPHILIZED, FOR SOLUTION INTRAVENOUS at 09:02

## 2020-02-25 RX ADMIN — APIXABAN 5 MG: 5 TABLET, FILM COATED ORAL at 08:02

## 2020-02-25 RX ADMIN — INSULIN ASPART 5 UNITS: 100 INJECTION, SOLUTION INTRAVENOUS; SUBCUTANEOUS at 01:02

## 2020-02-25 RX ADMIN — PIPERACILLIN AND TAZOBACTAM 4.5 G: 4; .5 INJECTION, POWDER, LYOPHILIZED, FOR SOLUTION INTRAVENOUS; PARENTERAL at 04:02

## 2020-02-25 RX ADMIN — IBUPROFEN 400 MG: 400 TABLET, FILM COATED ORAL at 04:02

## 2020-02-25 RX ADMIN — HYDROCODONE BITARTRATE AND ACETAMINOPHEN 1 TABLET: 5; 325 TABLET ORAL at 07:02

## 2020-02-25 RX ADMIN — METOPROLOL SUCCINATE 25 MG: 25 TABLET, FILM COATED, EXTENDED RELEASE ORAL at 08:02

## 2020-02-25 RX ADMIN — ACETAMINOPHEN 650 MG: 325 TABLET ORAL at 02:02

## 2020-02-25 RX ADMIN — ONDANSETRON 8 MG: 2 INJECTION INTRAMUSCULAR; INTRAVENOUS at 04:02

## 2020-02-25 RX ADMIN — INSULIN ASPART 3 UNITS: 100 INJECTION, SOLUTION INTRAVENOUS; SUBCUTANEOUS at 08:02

## 2020-02-25 NOTE — NURSING
Pt's temp 101.2 with , RR24. Dr. Garcia notified and prn tylenol is ordered. Will administer the tylenol and continue to monitor.

## 2020-02-25 NOTE — PLAN OF CARE
Problem: Adult Inpatient Plan of Care  Goal: Plan of Care Review    Patient is awake and orientedx4. Care plan explained to patient; he verbalized understanding. On room air, O2 saturation at 96%. Hooked to heart monitor running normal sinus rhythm at 80-90bpm. Urinal in reach. No n/v/d during shift. Patient had epigastrium pain that radiated to his back, prn medication given. NS infusing at 100mL/hr. Due medications given. Accuchecks completed, covered per sliding scale. Encouraged to turn every 2 hours as tolerated. Maintained on fall risk precaution. Bed in lowest position, bed alarm on, call light/personal items within reach and instructed to call for help when needed. Will continue to monitor.    Outcome: Ongoing, Progressing

## 2020-02-25 NOTE — PLAN OF CARE
Plan of care reviewed with patient and family. Pt AAOx4 today. Calls for assistance. Abdominal US done today. Pt scheduled to have a MRI/MRCP in am to rule out gallstones. Pt medicated x1 for complaint of flank pain in the back. 0 s/sx of hypo/hyperglycemia today. Took meds with 0 problems. Verbalizes to staff understanding. NSR on monitor with 0 red alarms noted.  No acute distress observed at this time. Side rails x2, bed in low position, call bell within reach. Bed alarm in place for patient safety. Will relay to oncoming nurse to monitor for changes in condition.

## 2020-02-25 NOTE — ASSESSMENT & PLAN NOTE
Fatty liver disease, nonalcoholic  Continue Creon  Monitor   Abdominal USS reported mildly dilated extrahepatic duct without intrahepatic dilatation.  Although, this appears increased from most recent liver Doppler from July 2019, overall findings appear similar to that of comparison CT from June 2019.  Further evaluation with dedicated MRI with MRCP or ERCP could be performed for further evaluation.  Additionally, correlation with biliary serology recommended  Consult GI- appreciates rec's - MRI with MRCP eval. Will likely need surgical consult - Dr. Rowe   Awaits MRI results

## 2020-02-25 NOTE — PLAN OF CARE
Pt's temp is 101.3 with elevated MEWS score of 4. /77; ; RR 22; 97% O2. No prn tylenol ordered in MAR.VN notified bedside nurse and Dr. Garcia. Awaiting response from MD. Will cont to be available as needed.

## 2020-02-25 NOTE — PLAN OF CARE
VN rounds: VN cued into pt's room with pt's permission. Pt resting in bed states he is very cold and notified his nurse. VN instructed pt to call for assistance. Pt aware and agreeable. Allowed time for questions. Denies pain. NAD noted. Will cont to be available as needed.

## 2020-02-25 NOTE — PLAN OF CARE
MET called at 1549 d/t pt febrile and tachycardic. MEWS score 7. See Rapid Response Documentation for full details.

## 2020-02-25 NOTE — PROGRESS NOTES
"[facility]    CC: abdominal pain    HPI 64 y.o. male with h/o ascites (but no cirrhosis) and portal vein stenosis s/p balloon dilation presented to ED with acute, severe, substernal and epigastric pain radiating to the right with associated extrahepatic biliary ductal dilation and nausea/vomiting. RUQ U/S with gallbladder sludge and borderline wall thickening, with mildly dilated extrahepatic duct without intrahepatic dilatation. Alk phos elevated but downtrending, tbili wnl.    Subjective  No acute events overnight. Afebrile, HDS. Continues to have mild epigastric pain radiation to RUQ. No nausea or vomiting overnight. Tolerating regular diet.    Past Medical History chronic pain, DM, CAD s/p CABG, ascites, portal vein stenosis s/p balloon dilation    Review of Systems  General ROS: negative for chills, fever or weight loss  Cardiovascular ROS: no chest pain or dyspnea on exertion  Gastrointestinal ROS: +abdominal pain, no change in bowel habits, or black/ bloody stools    Physical Examination  /66 (BP Location: Left arm, Patient Position: Lying)   Pulse 77   Temp 98.6 °F (37 °C) (Oral)   Resp 18   Ht 5' 6" (1.676 m)   Wt 106.5 kg (234 lb 12.6 oz)   SpO2 96%   BMI 37.90 kg/m²   General appearance: alert, cooperative, no distress  HENT: Normocephalic, atraumatic, neck symmetrical, no nasal discharge   Lungs: clear to auscultation bilaterally, no dullness to percussion bilaterally  Heart: regular rate and rhythm without rub; no displacement of the PMI   Abdomen: soft, non-tender; bowel sounds normoactive; no organomegaly  Extremities: extremities symmetric; no clubbing, cyanosis, or edema  Neurologic: Alert and oriented X 3, normal strength, normal coordination and gait    Labs: personally reviewed tbili wnl, alk phos downtrending 292 > 268, GGT mildly elevated 134    Imaging: personally reviewed RUQ U/S with gallbladder sludge and borderline wall thickening, with mildly dilated extrahepatic " duct without intrahepatic dilatation.    Assessment:   63yo M with h/o ascites (but no cirrhosis) and portal vein stenosis s/p balloon dilation presented to ED with acute, severe, substernal and epigastric pain radiating to the right with associated extrahepatic biliary ductal dilation and nausea/vomiting. RUQ U/S with gallbladder sludge and borderline wall thickening, with mildly dilated extrahepatic duct without intrahepatic dilatation. Alk phos elevated but downtrending, tbili wnl. Symptoms may be from cholecystitis, gallbladder sludge, passed gallstone or cholelithiasis    Plan:   - MRCP ordered  - if negative can consider surgery eval (Dr. Rowe team) for CCY or obtaining HIDA scan    Audrey Moseley MD, YUDITH  [facility], PGY-4  Cell: [phone]  Pager: [phone]

## 2020-02-25 NOTE — PROGRESS NOTES
[facility] Hospital Medicine  Progress Note    Patient Name: Jian Arrieta  MRN: [id number]  Patient Class: OP- Observation   Admission Date: 2/23/2020  Length of Stay: 0 days  Attending Physician: Marialuisa Ybarra*  Primary Care Provider: Leon Barajas DO        Subjective:     Principal Problem:Chest pain        HPI:  Jian Arrieta is a 63 y/o M with h/o CAD, chronic pancreatitis, HTN, chronic Lymphedema, former smoker, morbid obesity, DM II, CABG in x3 years ago (2017), last stents were placed x2 years ago (2018). present with 2 days history of chest heaviness and pain located in the epigastric area, pain is about 2-3 radiating to the back. Pain is exacerbated with exertion like riding lawnmower. There is an associated right flank pain. He denies chills, fever, nausea, or vomiting.     Overview/Hospital Course:  No notes on file    Interval History: awake and alert,     Appreciates GI rec's - MRI with MRCP eval. Will likely need surgical consult - Dr. Rowe   Awaits MRI results    Review of Systems   Constitutional: Negative for chills and fever.   Respiratory: Negative for apnea, shortness of breath and wheezing.    Cardiovascular: Negative for chest pain.   Gastrointestinal: Negative for abdominal distention, diarrhea and nausea.   Genitourinary: Negative for dysuria, enuresis and hematuria.   Musculoskeletal: Negative for arthralgias and back pain.   Neurological: Negative for syncope, light-headedness and headaches.   Psychiatric/Behavioral: Negative for behavioral problems.     Objective:     Vital Signs (Most Recent):  Temp: 98.6 °F (37 °C) (02/25/20 0802)  Pulse: 77 (02/25/20 0802)  Resp: 18 (02/25/20 0802)  BP: 130/66 (02/25/20 0802)  SpO2: 96 % (02/25/20 0802) Vital Signs (24h Range):  Temp:  [97 °F (36.1 °C)-98.6 °F (37 °C)] 98.6 °F (37 °C)  Pulse:  [77-89] 77  Resp:  [16-18] 18  SpO2:  [96 %-97 %] 96 %  BP: (119-141)/(59-74) 130/66     Weight: 106.5 kg (234 lb 12.6  oz)  Body mass index is 37.9 kg/m².    Intake/Output Summary (Last 24 hours) at 2/25/2020 0847  Last data filed at 2/25/2020 0500  Gross per 24 hour   Intake 2331.67 ml   Output 500 ml   Net 1831.67 ml      Physical Exam   Constitutional: He is oriented to person, place, and time. He appears well-developed and well-nourished.   HENT:   Head: Normocephalic and atraumatic.   Neck: Neck supple.   Cardiovascular: Normal rate, regular rhythm and normal heart sounds. Exam reveals no gallop and no friction rub.   No murmur heard.  Pulmonary/Chest: Effort normal and breath sounds normal.   Abdominal: Soft. Bowel sounds are normal. There is no tenderness.   Musculoskeletal: Normal range of motion. He exhibits no edema or tenderness.   + edema   Neurological: He is alert and oriented to person, place, and time.   Skin: Skin is warm. Capillary refill takes less than 2 seconds.   Psychiatric: His speech is normal and behavior is normal. His mood appears not anxious. Cognition and memory are normal. He does not exhibit a depressed mood.       Significant Labs:   Bilirubin:   Recent Labs   Lab 02/21/20  1157 02/23/20  1238 02/24/20  0542 02/25/20  0618   BILITOT 0.5 0.8 0.8 0.6     CBC:   Recent Labs   Lab 02/23/20  1238 02/24/20  0542 02/25/20  0618   WBC 16.63* 18.81* 13.04*   HGB 13.7* 13.8* 12.5*   HCT 41.2 41.6 38.1*    181 149*     CMP:   Recent Labs   Lab 02/23/20  1238 02/24/20  0542 02/25/20  0618   * 136 133*   K 4.1 3.9 4.7   CL 99 102 104   CO2 21* 27 19*   * 129* 281*   BUN 18 21 23   CREATININE 1.7* 1.4 1.4   CALCIUM 8.6* 8.6* 7.8*   PROT 6.6 6.3 5.7*   ALBUMIN 3.2* 2.9* 2.5*   BILITOT 0.8 0.8 0.6   ALKPHOS 394* 292* 268*   AST 90* 38 26   * 70* 41   ANIONGAP 12 7* 10   EGFRNONAA 41* 52* 52*     Cardiac Markers: No results for input(s): CKMB, MYOGLOBIN, BNP, TROPISTAT in the last 48 hours.  Lactic Acid: No results for input(s): LACTATE in the last 48 hours.  Lipase:   Recent Labs   Lab  02/25/20  0618   LIPASE 4     Magnesium:   Recent Labs   Lab 02/24/20  0542 02/25/20  0618   MG 1.8 1.7       Significant Imaging: I have reviewed all pertinent imaging results/findings within the past 24 hours.      Assessment/Plan:      * Chest pain  Coronary artery disease due to calcified coronary lesion  Trend troponin    Right lower quadrant abdominal pain  RUQ USS  PPI      Chronic acquired lymphedema  chronic      ELIEZER (acute kidney injury)  Chronic kidney disease, stage 3  Avoid nephrotoxic agents  Renally dose medication      Type 2 diabetes mellitus with diabetic neuropathy, with long-term current use of insulin  Hyperglycemia  levemir  Low dose SSI  accucheck  Diabetic diet      Chronic diastolic heart failure  Stable.      Chronic pancreatitis  Fatty liver disease, nonalcoholic  Continue Creon  Monitor   Abdominal USS reported mildly dilated extrahepatic duct without intrahepatic dilatation.  Although, this appears increased from most recent liver Doppler from July 2019, overall findings appear similar to that of comparison CT from June 2019.  Further evaluation with dedicated MRI with MRCP or ERCP could be performed for further evaluation.  Additionally, correlation with biliary serology recommended  Consult GI- appreciates rec's - MRI with MRCP eval. Will likely need surgical consult - Dr. Rowe   Awaits MRI results      Coronary artery disease due to calcified coronary lesion  Continue Eliquis        VTE Risk Mitigation (From admission, onward)         Ordered     apixaban tablet 5 mg  2 times daily      02/23/20 1704     Place sequential compression device  Until discontinued      02/23/20 1704                      Marialuisa EPI Ybarra MD  Department of Hospital Medicine   [facility]

## 2020-02-25 NOTE — SUBJECTIVE & OBJECTIVE
Interval History: awake and alert,     Appreciates GI rec's - MRI with MRCP eval. Will likely need surgical consult - Dr. Rowe   Awaits MRI results    Review of Systems   Constitutional: Negative for chills and fever.   Respiratory: Negative for apnea, shortness of breath and wheezing.    Cardiovascular: Negative for chest pain.   Gastrointestinal: Negative for abdominal distention, diarrhea and nausea.   Genitourinary: Negative for dysuria, enuresis and hematuria.   Musculoskeletal: Negative for arthralgias and back pain.   Neurological: Negative for syncope, light-headedness and headaches.   Psychiatric/Behavioral: Negative for behavioral problems.     Objective:     Vital Signs (Most Recent):  Temp: 98.6 °F (37 °C) (02/25/20 0802)  Pulse: 77 (02/25/20 0802)  Resp: 18 (02/25/20 0802)  BP: 130/66 (02/25/20 0802)  SpO2: 96 % (02/25/20 0802) Vital Signs (24h Range):  Temp:  [97 °F (36.1 °C)-98.6 °F (37 °C)] 98.6 °F (37 °C)  Pulse:  [77-89] 77  Resp:  [16-18] 18  SpO2:  [96 %-97 %] 96 %  BP: (119-141)/(59-74) 130/66     Weight: 106.5 kg (234 lb 12.6 oz)  Body mass index is 37.9 kg/m².    Intake/Output Summary (Last 24 hours) at 2/25/2020 0847  Last data filed at 2/25/2020 0500  Gross per 24 hour   Intake 2331.67 ml   Output 500 ml   Net 1831.67 ml      Physical Exam   Constitutional: He is oriented to person, place, and time. He appears well-developed and well-nourished.   HENT:   Head: Normocephalic and atraumatic.   Neck: Neck supple.   Cardiovascular: Normal rate, regular rhythm and normal heart sounds. Exam reveals no gallop and no friction rub.   No murmur heard.  Pulmonary/Chest: Effort normal and breath sounds normal.   Abdominal: Soft. Bowel sounds are normal. There is no tenderness.   Musculoskeletal: Normal range of motion. He exhibits no edema or tenderness.   + edema   Neurological: He is alert and oriented to person, place, and time.   Skin: Skin is warm. Capillary refill takes less than 2 seconds.    Psychiatric: His speech is normal and behavior is normal. His mood appears not anxious. Cognition and memory are normal. He does not exhibit a depressed mood.       Significant Labs:   Bilirubin:   Recent Labs   Lab 02/21/20  1157 02/23/20  1238 02/24/20  0542 02/25/20  0618   BILITOT 0.5 0.8 0.8 0.6     CBC:   Recent Labs   Lab 02/23/20  1238 02/24/20  0542 02/25/20  0618   WBC 16.63* 18.81* 13.04*   HGB 13.7* 13.8* 12.5*   HCT 41.2 41.6 38.1*    181 149*     CMP:   Recent Labs   Lab 02/23/20  1238 02/24/20  0542 02/25/20  0618   * 136 133*   K 4.1 3.9 4.7   CL 99 102 104   CO2 21* 27 19*   * 129* 281*   BUN 18 21 23   CREATININE 1.7* 1.4 1.4   CALCIUM 8.6* 8.6* 7.8*   PROT 6.6 6.3 5.7*   ALBUMIN 3.2* 2.9* 2.5*   BILITOT 0.8 0.8 0.6   ALKPHOS 394* 292* 268*   AST 90* 38 26   * 70* 41   ANIONGAP 12 7* 10   EGFRNONAA 41* 52* 52*     Cardiac Markers: No results for input(s): CKMB, MYOGLOBIN, BNP, TROPISTAT in the last 48 hours.  Lactic Acid: No results for input(s): LACTATE in the last 48 hours.  Lipase:   Recent Labs   Lab 02/25/20  0618   LIPASE 4     Magnesium:   Recent Labs   Lab 02/24/20  0542 02/25/20  0618   MG 1.8 1.7       Significant Imaging: I have reviewed all pertinent imaging results/findings within the past 24 hours.

## 2020-02-25 NOTE — NURSING
Patient appears shaking, temp elevated to 102. /93; ; RR26; O2 sat 97% on RA. Dr. Garcia notified. Stat CXR ordered. Will continue to monitor.

## 2020-02-26 ENCOUNTER — ANESTHESIA EVENT (OUTPATIENT)
Dept: ENDOSCOPY | Facility: HOSPITAL | Age: 65
DRG: 871 | End: 2020-02-26
Payer: MEDICARE

## 2020-02-26 ENCOUNTER — ANESTHESIA EVENT (OUTPATIENT)
Dept: INTENSIVE CARE | Facility: HOSPITAL | Age: 65
DRG: 871 | End: 2020-02-26
Payer: MEDICARE

## 2020-02-26 ENCOUNTER — ANESTHESIA (OUTPATIENT)
Dept: INTENSIVE CARE | Facility: HOSPITAL | Age: 65
DRG: 871 | End: 2020-02-26
Payer: MEDICARE

## 2020-02-26 ENCOUNTER — ANESTHESIA (OUTPATIENT)
Dept: ENDOSCOPY | Facility: HOSPITAL | Age: 65
DRG: 871 | End: 2020-02-26
Payer: MEDICARE

## 2020-02-26 PROBLEM — R78.81 BACTEREMIA: Status: ACTIVE | Noted: 2020-02-26

## 2020-02-26 PROBLEM — A41.9 SEPSIS: Status: ACTIVE | Noted: 2020-02-26

## 2020-02-26 LAB
ALBUMIN SERPL BCP-MCNC: 2.2 G/DL (ref 3.5–5.2)
ALP SERPL-CCNC: 276 U/L (ref 55–135)
ALT SERPL W/O P-5'-P-CCNC: 39 U/L (ref 10–44)
AMORPH CRY URNS QL MICRO: ABNORMAL
ANION GAP SERPL CALC-SCNC: 10 MMOL/L (ref 8–16)
ANION GAP SERPL CALC-SCNC: 11 MMOL/L (ref 8–16)
ANISOCYTOSIS BLD QL SMEAR: SLIGHT
AST SERPL-CCNC: 38 U/L (ref 10–40)
BACTERIA #/AREA URNS HPF: ABNORMAL /HPF
BASOPHILS # BLD AUTO: 0.05 K/UL (ref 0–0.2)
BASOPHILS NFR BLD: 0.2 % (ref 0–1.9)
BILIRUB SERPL-MCNC: 1.2 MG/DL (ref 0.1–1)
BILIRUB UR QL STRIP: ABNORMAL
BNP SERPL-MCNC: 1607 PG/ML (ref 0–99)
BUN SERPL-MCNC: 28 MG/DL (ref 8–23)
BUN SERPL-MCNC: 36 MG/DL (ref 8–23)
BURR CELLS BLD QL SMEAR: ABNORMAL
CALCIUM SERPL-MCNC: 7.7 MG/DL (ref 8.7–10.5)
CALCIUM SERPL-MCNC: 7.8 MG/DL (ref 8.7–10.5)
CHLORIDE SERPL-SCNC: 103 MMOL/L (ref 95–110)
CHLORIDE SERPL-SCNC: 103 MMOL/L (ref 95–110)
CLARITY UR: ABNORMAL
CO2 SERPL-SCNC: 20 MMOL/L (ref 23–29)
CO2 SERPL-SCNC: 22 MMOL/L (ref 23–29)
COLOR UR: ABNORMAL
CREAT SERPL-MCNC: 2 MG/DL (ref 0.5–1.4)
CREAT SERPL-MCNC: 2.5 MG/DL (ref 0.5–1.4)
DIFFERENTIAL METHOD: ABNORMAL
EOSINOPHIL # BLD AUTO: 0.4 K/UL (ref 0–0.5)
EOSINOPHIL NFR BLD: 1.8 % (ref 0–8)
ERYTHROCYTE [DISTWIDTH] IN BLOOD BY AUTOMATED COUNT: 13.4 % (ref 11.5–14.5)
EST. GFR  (AFRICAN AMERICAN): 30 ML/MIN/1.73 M^2
EST. GFR  (AFRICAN AMERICAN): 39 ML/MIN/1.73 M^2
EST. GFR  (NON AFRICAN AMERICAN): 26 ML/MIN/1.73 M^2
EST. GFR  (NON AFRICAN AMERICAN): 34 ML/MIN/1.73 M^2
GLUCOSE SERPL-MCNC: 205 MG/DL (ref 70–110)
GLUCOSE SERPL-MCNC: 228 MG/DL (ref 70–110)
GLUCOSE UR QL STRIP: ABNORMAL
HCT VFR BLD AUTO: 36.3 % (ref 40–54)
HGB BLD-MCNC: 12 G/DL (ref 14–18)
HGB UR QL STRIP: ABNORMAL
HYALINE CASTS #/AREA URNS LPF: 0 /LPF
IMM GRANULOCYTES # BLD AUTO: 0.42 K/UL (ref 0–0.04)
IMM GRANULOCYTES NFR BLD AUTO: 1.8 % (ref 0–0.5)
KETONES UR QL STRIP: ABNORMAL
LACTATE SERPL-SCNC: 2.7 MMOL/L (ref 0.5–2.2)
LEUKOCYTE ESTERASE UR QL STRIP: NEGATIVE
LYMPHOCYTES # BLD AUTO: 0.5 K/UL (ref 1–4.8)
LYMPHOCYTES NFR BLD: 2.2 % (ref 18–48)
MAGNESIUM SERPL-MCNC: 1.5 MG/DL (ref 1.6–2.6)
MAGNESIUM SERPL-MCNC: 1.7 MG/DL (ref 1.6–2.6)
MCH RBC QN AUTO: 29.5 PG (ref 27–31)
MCHC RBC AUTO-ENTMCNC: 33.1 G/DL (ref 32–36)
MCV RBC AUTO: 89 FL (ref 82–98)
MICROSCOPIC COMMENT: ABNORMAL
MONOCYTES # BLD AUTO: 1.4 K/UL (ref 0.3–1)
MONOCYTES NFR BLD: 6.1 % (ref 4–15)
NEUTROPHILS # BLD AUTO: 20.1 K/UL (ref 1.8–7.7)
NEUTROPHILS NFR BLD: 87.9 % (ref 38–73)
NITRITE UR QL STRIP: NEGATIVE
NRBC BLD-RTO: 0 /100 WBC
PH UR STRIP: 5 [PH] (ref 5–8)
PHOSPHATE SERPL-MCNC: 2.3 MG/DL (ref 2.7–4.5)
PHOSPHATE SERPL-MCNC: 2.7 MG/DL (ref 2.7–4.5)
PLATELET # BLD AUTO: 147 K/UL (ref 150–350)
PLATELET BLD QL SMEAR: ABNORMAL
PMV BLD AUTO: 11.6 FL (ref 9.2–12.9)
POCT GLUCOSE: 132 MG/DL (ref 70–110)
POCT GLUCOSE: 226 MG/DL (ref 70–110)
POCT GLUCOSE: 237 MG/DL (ref 70–110)
POCT GLUCOSE: 244 MG/DL (ref 70–110)
POIKILOCYTOSIS BLD QL SMEAR: SLIGHT
POLYCHROMASIA BLD QL SMEAR: ABNORMAL
POTASSIUM SERPL-SCNC: 3.7 MMOL/L (ref 3.5–5.1)
POTASSIUM SERPL-SCNC: 3.9 MMOL/L (ref 3.5–5.1)
PROT SERPL-MCNC: 5.2 G/DL (ref 6–8.4)
PROT UR QL STRIP: ABNORMAL
RBC # BLD AUTO: 4.07 M/UL (ref 4.6–6.2)
RBC #/AREA URNS HPF: 0 /HPF (ref 0–4)
SODIUM SERPL-SCNC: 134 MMOL/L (ref 136–145)
SODIUM SERPL-SCNC: 135 MMOL/L (ref 136–145)
SP GR UR STRIP: >=1.03 (ref 1–1.03)
SQUAMOUS #/AREA URNS HPF: 16 /HPF
URN SPEC COLLECT METH UR: ABNORMAL
UROBILINOGEN UR STRIP-ACNC: ABNORMAL EU/DL
WBC # BLD AUTO: 22.89 K/UL (ref 3.9–12.7)
WBC #/AREA URNS HPF: 0 /HPF (ref 0–5)

## 2020-02-26 PROCEDURE — 25000003 PHARM REV CODE 250: Performed by: NURSE PRACTITIONER

## 2020-02-26 PROCEDURE — 63600175 PHARM REV CODE 636 W HCPCS: Performed by: FAMILY MEDICINE

## 2020-02-26 PROCEDURE — 25000003 PHARM REV CODE 250: Performed by: ANESTHESIOLOGY

## 2020-02-26 PROCEDURE — 99900035 HC TECH TIME PER 15 MIN (STAT)

## 2020-02-26 PROCEDURE — 25000003 PHARM REV CODE 250: Performed by: NURSE ANESTHETIST, CERTIFIED REGISTERED

## 2020-02-26 PROCEDURE — 43259 EGD US EXAM DUODENUM/JEJUNUM: CPT | Performed by: INTERNAL MEDICINE

## 2020-02-26 PROCEDURE — 63600175 PHARM REV CODE 636 W HCPCS: Performed by: INTERNAL MEDICINE

## 2020-02-26 PROCEDURE — 99223 PR INITIAL HOSPITAL CARE,LEVL III: ICD-10-PCS | Mod: ,,, | Performed by: INTERNAL MEDICINE

## 2020-02-26 PROCEDURE — 37000008 HC ANESTHESIA 1ST 15 MINUTES: Performed by: INTERNAL MEDICINE

## 2020-02-26 PROCEDURE — 81000 URINALYSIS NONAUTO W/SCOPE: CPT

## 2020-02-26 PROCEDURE — 63600175 PHARM REV CODE 636 W HCPCS: Performed by: NURSE ANESTHETIST, CERTIFIED REGISTERED

## 2020-02-26 PROCEDURE — 99232 PR SUBSEQUENT HOSPITAL CARE,LEVL II: ICD-10-PCS | Mod: ,,, | Performed by: INTERNAL MEDICINE

## 2020-02-26 PROCEDURE — 83735 ASSAY OF MAGNESIUM: CPT

## 2020-02-26 PROCEDURE — 96366 THER/PROPH/DIAG IV INF ADDON: CPT

## 2020-02-26 PROCEDURE — 96376 TX/PRO/DX INJ SAME DRUG ADON: CPT

## 2020-02-26 PROCEDURE — 83605 ASSAY OF LACTIC ACID: CPT

## 2020-02-26 PROCEDURE — 94761 N-INVAS EAR/PLS OXIMETRY MLT: CPT

## 2020-02-26 PROCEDURE — 83735 ASSAY OF MAGNESIUM: CPT | Mod: 91

## 2020-02-26 PROCEDURE — 99223 1ST HOSP IP/OBS HIGH 75: CPT | Mod: ,,, | Performed by: INTERNAL MEDICINE

## 2020-02-26 PROCEDURE — 84100 ASSAY OF PHOSPHORUS: CPT

## 2020-02-26 PROCEDURE — 76937 US GUIDE VASCULAR ACCESS: CPT | Performed by: ANESTHESIOLOGY

## 2020-02-26 PROCEDURE — C9399 UNCLASSIFIED DRUGS OR BIOLOG: HCPCS | Performed by: FAMILY MEDICINE

## 2020-02-26 PROCEDURE — 80053 COMPREHEN METABOLIC PANEL: CPT

## 2020-02-26 PROCEDURE — 36415 COLL VENOUS BLD VENIPUNCTURE: CPT

## 2020-02-26 PROCEDURE — 20000000 HC ICU ROOM

## 2020-02-26 PROCEDURE — 43237 PR ENDOSCOPIC US EXAM, ESOPH: ICD-10-PCS | Mod: ,,, | Performed by: INTERNAL MEDICINE

## 2020-02-26 PROCEDURE — 80048 BASIC METABOLIC PNL TOTAL CA: CPT

## 2020-02-26 PROCEDURE — 83880 ASSAY OF NATRIURETIC PEPTIDE: CPT

## 2020-02-26 PROCEDURE — 27000221 HC OXYGEN, UP TO 24 HOURS

## 2020-02-26 PROCEDURE — 84100 ASSAY OF PHOSPHORUS: CPT | Mod: 91

## 2020-02-26 PROCEDURE — 25000003 PHARM REV CODE 250: Performed by: FAMILY MEDICINE

## 2020-02-26 PROCEDURE — 37000009 HC ANESTHESIA EA ADD 15 MINS: Performed by: INTERNAL MEDICINE

## 2020-02-26 PROCEDURE — 99232 SBSQ HOSP IP/OBS MODERATE 35: CPT | Mod: ,,, | Performed by: INTERNAL MEDICINE

## 2020-02-26 PROCEDURE — C1751 CATH, INF, PER/CENT/MIDLINE: HCPCS | Performed by: ANESTHESIOLOGY

## 2020-02-26 PROCEDURE — 43237 ENDOSCOPIC US EXAM ESOPH: CPT | Mod: ,,, | Performed by: INTERNAL MEDICINE

## 2020-02-26 RX ORDER — SODIUM CHLORIDE, SODIUM LACTATE, POTASSIUM CHLORIDE, CALCIUM CHLORIDE 600; 310; 30; 20 MG/100ML; MG/100ML; MG/100ML; MG/100ML
INJECTION, SOLUTION INTRAVENOUS CONTINUOUS
Status: DISCONTINUED | OUTPATIENT
Start: 2020-02-26 | End: 2020-02-26

## 2020-02-26 RX ORDER — INSULIN ASPART 100 [IU]/ML
1-10 INJECTION, SOLUTION INTRAVENOUS; SUBCUTANEOUS
Status: DISCONTINUED | OUTPATIENT
Start: 2020-02-26 | End: 2020-03-03 | Stop reason: HOSPADM

## 2020-02-26 RX ORDER — CALCIUM CARBONATE 200(500)MG
1000 TABLET,CHEWABLE ORAL 3 TIMES DAILY PRN
Status: DISCONTINUED | OUTPATIENT
Start: 2020-02-26 | End: 2020-03-03 | Stop reason: HOSPADM

## 2020-02-26 RX ORDER — ETOMIDATE 2 MG/ML
INJECTION INTRAVENOUS
Status: DISCONTINUED | OUTPATIENT
Start: 2020-02-26 | End: 2020-02-26

## 2020-02-26 RX ORDER — PROPOFOL 10 MG/ML
VIAL (ML) INTRAVENOUS
Status: DISCONTINUED | OUTPATIENT
Start: 2020-02-26 | End: 2020-02-26

## 2020-02-26 RX ORDER — LIDOCAINE HYDROCHLORIDE 10 MG/ML
INJECTION, SOLUTION EPIDURAL; INFILTRATION; INTRACAUDAL; PERINEURAL
Status: COMPLETED | OUTPATIENT
Start: 2020-02-26 | End: 2020-02-26

## 2020-02-26 RX ORDER — POTASSIUM CHLORIDE 7.45 MG/ML
10 INJECTION INTRAVENOUS ONCE
Status: COMPLETED | OUTPATIENT
Start: 2020-02-26 | End: 2020-02-27

## 2020-02-26 RX ORDER — ONDANSETRON 2 MG/ML
INJECTION INTRAMUSCULAR; INTRAVENOUS
Status: DISCONTINUED | OUTPATIENT
Start: 2020-02-26 | End: 2020-02-26

## 2020-02-26 RX ORDER — LIDOCAINE HYDROCHLORIDE 20 MG/ML
INJECTION INTRAVENOUS
Status: DISCONTINUED | OUTPATIENT
Start: 2020-02-26 | End: 2020-02-26

## 2020-02-26 RX ORDER — FENTANYL CITRATE 50 UG/ML
INJECTION, SOLUTION INTRAMUSCULAR; INTRAVENOUS
Status: DISCONTINUED | OUTPATIENT
Start: 2020-02-26 | End: 2020-02-26

## 2020-02-26 RX ORDER — SODIUM CHLORIDE 9 MG/ML
INJECTION, SOLUTION INTRAVENOUS CONTINUOUS PRN
Status: DISCONTINUED | OUTPATIENT
Start: 2020-02-26 | End: 2020-02-26

## 2020-02-26 RX ORDER — MAGNESIUM SULFATE 1 G/100ML
1 INJECTION INTRAVENOUS ONCE
Status: COMPLETED | OUTPATIENT
Start: 2020-02-26 | End: 2020-02-27

## 2020-02-26 RX ORDER — MAGNESIUM SULFATE 1 G/100ML
1 INJECTION INTRAVENOUS ONCE
Status: COMPLETED | OUTPATIENT
Start: 2020-02-26 | End: 2020-02-26

## 2020-02-26 RX ADMIN — ETOMIDATE 4 MG: 2 INJECTION, SOLUTION INTRAVENOUS at 12:02

## 2020-02-26 RX ADMIN — CALCIUM CARBONATE (ANTACID) CHEW TAB 500 MG 1000 MG: 500 CHEW TAB at 06:02

## 2020-02-26 RX ADMIN — FENTANYL CITRATE 25 MCG: 50 INJECTION, SOLUTION INTRAMUSCULAR; INTRAVENOUS at 12:02

## 2020-02-26 RX ADMIN — INSULIN ASPART 4 UNITS: 100 INJECTION, SOLUTION INTRAVENOUS; SUBCUTANEOUS at 05:02

## 2020-02-26 RX ADMIN — SODIUM CHLORIDE, SODIUM LACTATE, POTASSIUM CHLORIDE, AND CALCIUM CHLORIDE: .6; .31; .03; .02 INJECTION, SOLUTION INTRAVENOUS at 11:02

## 2020-02-26 RX ADMIN — Medication 0.1 MCG/KG/MIN: at 09:02

## 2020-02-26 RX ADMIN — SODIUM CHLORIDE: 0.9 INJECTION, SOLUTION INTRAVENOUS at 05:02

## 2020-02-26 RX ADMIN — LIDOCAINE HYDROCHLORIDE 20 MG: 10 INJECTION, SOLUTION EPIDURAL; INFILTRATION; INTRACAUDAL; PERINEURAL at 11:02

## 2020-02-26 RX ADMIN — ONDANSETRON 8 MG: 2 INJECTION, SOLUTION INTRAMUSCULAR; INTRAVENOUS at 12:02

## 2020-02-26 RX ADMIN — ONDANSETRON 8 MG: 2 INJECTION INTRAMUSCULAR; INTRAVENOUS at 05:02

## 2020-02-26 RX ADMIN — INSULIN DETEMIR 15 UNITS: 100 INJECTION, SOLUTION SUBCUTANEOUS at 09:02

## 2020-02-26 RX ADMIN — MAGNESIUM SULFATE 1 G: 1 INJECTION INTRAVENOUS at 11:02

## 2020-02-26 RX ADMIN — PROPOFOL 30 MG: 10 INJECTION, EMULSION INTRAVENOUS at 12:02

## 2020-02-26 RX ADMIN — PIPERACILLIN AND TAZOBACTAM 4.5 G: 4; .5 INJECTION, POWDER, LYOPHILIZED, FOR SOLUTION INTRAVENOUS; PARENTERAL at 06:02

## 2020-02-26 RX ADMIN — LIDOCAINE HYDROCHLORIDE 100 MG: 20 INJECTION, SOLUTION INTRAVENOUS at 12:02

## 2020-02-26 RX ADMIN — ETOMIDATE 8 MG: 2 INJECTION, SOLUTION INTRAVENOUS at 12:02

## 2020-02-26 RX ADMIN — PIPERACILLIN SODIUM AND TAZOBACTAM SODIUM 4.5 G: 4; .5 INJECTION, POWDER, LYOPHILIZED, FOR SOLUTION INTRAVENOUS at 01:02

## 2020-02-26 RX ADMIN — PIPERACILLIN SODIUM AND TAZOBACTAM SODIUM 4.5 G: 4; .5 INJECTION, POWDER, LYOPHILIZED, FOR SOLUTION INTRAVENOUS at 09:02

## 2020-02-26 RX ADMIN — CALCIUM CARBONATE (ANTACID) CHEW TAB 500 MG 1000 MG: 500 CHEW TAB at 07:02

## 2020-02-26 RX ADMIN — VANCOMYCIN HYDROCHLORIDE 1000 MG: 1 INJECTION, POWDER, LYOPHILIZED, FOR SOLUTION INTRAVENOUS at 09:02

## 2020-02-26 RX ADMIN — SODIUM CHLORIDE: 0.9 INJECTION, SOLUTION INTRAVENOUS at 12:02

## 2020-02-26 NOTE — NURSING
Notified MAGAN Monet NP that the patient was diaphoretic (BS stable), and his BP was very low 70s/40s despite receiving 3L of LR. She put in orders to transfer him to ICU on a norepinephrine drip. Gave report to FABIANO Escamilla in ICU. Family notified

## 2020-02-26 NOTE — PLAN OF CARE
Visit with pt for d/c planning.  Pt lives at home with his spouse, states he currently uses no DME, independent with all ADL's.  At time of d/c his wife will be available for any needs at home but will have family member pick him up at d/c.  Discussed observation status with pt, verbalizes understanding and will await further testing.      Discharge planning brochure provided. White board updated with CM name & contact info.  Pt encouraged to call with any questions or needs. CM will continue to follow patient throughout the transitions of care, and assist with any discharge needs.       02/24/20 1600   Discharge Assessment   Assessment Type Discharge Planning Assessment   Confirmed/corrected address and phone number on facesheet? Yes   Assessment information obtained from? Patient   Expected Length of Stay (days) 2   Communicated expected length of stay with patient/caregiver yes   Prior to hospitilization cognitive status: Alert/Oriented   Prior to hospitalization functional status: Independent   Current cognitive status: Alert/Oriented   Current Functional Status: Independent   Lives With spouse   Able to Return to Prior Arrangements yes   Is patient able to care for self after discharge? Yes   Who are your caregiver(s) and their phone number(s)?   ([phone])   Readmission Within the Last 30 Days no previous admission in last 30 days   Patient currently being followed by outpatient case management? No   Patient currently receives any other outside agency services? No   Equipment Currently Used at Home walker, rolling   Do you have any problems affording any of your prescribed medications? No   Is the patient taking medications as prescribed? yes   Does the patient have transportation home? Yes   Transportation Anticipated family or friend will provide   Does the patient receive services at the [facility]? No   Discharge Plan A Home   DME Needed Upon Discharge  none   Patient/Family in Agreement with Plan  yes       Kelin Franz RN    [phone]

## 2020-02-26 NOTE — PROGRESS NOTES
Therapy with vancomycin complete and/or consult discontinued by provider.  Pharmacy will sign off, please re-consult as needed.    Harper Mays, CullenD, BCPS

## 2020-02-26 NOTE — [employer] PATIENT INSTRUCTIONS
Discharge Summary/Instructions after an Endoscopic Procedure  Patient Name: Jian Arrieta  Patient MRN: [id number]  Patient YOB: 1955 Wednesday, February 26, 2020  Robbie Yang MD  RESTRICTIONS:  During your procedure today, you received medications for sedation.  These   medications may affect your judgment, balance and coordination.  Therefore,   for 24 hours, you have the following restrictions:   - DO NOT drive a car, operate machinery, make legal/financial decisions,   sign important papers or drink alcohol.    ACTIVITY:  Today: no heavy lifting, straining or running due to procedural   sedation/anesthesia.  The following day: return to full activity including work.  DIET:  Eat and drink normally unless instructed otherwise.     TREATMENT FOR COMMON SIDE EFFECTS:  - Mild abdominal pain, nausea, belching, bloating or excessive gas:  rest,   eat lightly and use a heating pad.  - Sore Throat: treat with throat lozenges and/or gargle with warm salt   water.  - Because air was used during the procedure, expelling large amounts of air   from your rectum or belching is normal.  - If a bowel prep was taken, you may not have a bowel movement for 1-3 days.    This is normal.  SYMPTOMS TO WATCH FOR AND REPORT TO YOUR PHYSICIAN:  1. Abdominal pain or bloating, other than gas cramps.  2. Chest pain.  3. Back pain.  4. Signs of infection such as: chills or fever occurring within 24 hours   after the procedure.  5. Rectal bleeding, which would show as bright red, maroon, or black stools.   (A tablespoon of blood from the rectum is not serious, especially if   hemorrhoids are present.)  6. Vomiting.  7. Weakness or dizziness.  GO DIRECTLY TO THE NEAREST EMERGENCY ROOM IF YOU HAVE ANY OF THE FOLLOWING:      Difficulty breathing              Chills and/or fever over 101 F   Persistent vomiting and/or vomiting blood   Severe abdominal pain   Severe chest pain   Black, tarry stools   Bleeding- more than one  tablespoon   Any other symptom or condition that you feel may need urgent attention  Your doctor recommends these additional instructions:  If any biopsies were taken, your doctors clinic will contact you in 1 to 2   weeks with any results.  - Return patient to ICU for ongoing care.   - No indication for ERCP.  - Clinical scenario is concerning for cholecystitis. Recommend surgical   consult.  For questions, problems or results please call your physician - Robbie Yang MD at Work:  [phone].  EMERGENCY PHONE NUMBER: [phone],  LAB RESULTS: [phone]  IF A COMPLICATION OR EMERGENCY SITUATION ARISES AND YOU ARE UNABLE TO REACH   YOUR PHYSICIAN - GO DIRECTLY TO THE EMERGENCY ROOM.  Robbie Yang MD  2/26/2020 1:03:00 PM  This report has been verified and signed electronically.  [employer]

## 2020-02-26 NOTE — SUBJECTIVE & OBJECTIVE
Past Medical History:   Diagnosis Date    Alcohol abuse     Anasarca 1/28/2019    Arthropathy associated with neurological disorder 9/2/2015    Atherosclerosis     Charcot foot due to diabetes mellitus     Chronic combined systolic and diastolic heart failure 01/29/2019 1-28-19 Left VentricleModerate decreased ejection fraction at 30%. Normal left ventricular cavity size. Normal wall thickness observed. Grade I (mild) left ventricular diastolic dysfunction consistent with impaired relaxation. Normal left atrial pressure. Moderate, global hypokinesis(see wall scoring diagram). Right VentricleNormal cavity size, wall thickness and ejection fraction. Wall motion n    Chronic pancreatitis 1/28/2019    Colon polyps     approx 5 yrs ago    Coronary artery disease due to calcified coronary lesion 05/08/2015    5 stents on ASA      Diabetic polyneuropathy associated with type 2 diabetes mellitus 9/2/2015    Diverticulosis 1/28/2019    DM type 2 with diabetic peripheral neuropathy 2/4/2019    Encounter for blood transfusion     Essential hypertension 1/28/2019    Former smoker 8/26/2015    Healed ulcer of left foot on examination 6/20/2017    Hydrocele     approx 1.5 yrs ago    Hypoalbuminemia 2/4/2019    Lymphedema of both lower extremities 1/29/2019    Mixed hyperlipidemia 5/8/2015    Morbid obesity with BMI of 50.0-59.9, adult 5/8/2015    Onychomycosis of multiple toenails with type 2 diabetes mellitus and peripheral neuropathy 6/20/2017    Perianal cyst     approx 2 yrs ago    Pseudocyst of pancreas 1/28/2019 1-28-19 Liver has a cirrhotic morphology with no focal lesions.  Significant interval increase in ascites when compared to prior exam which may account for patient's abdominal distension.  Hypodense air-fluid collection along the body of the pancreas which is slightly smaller when compared to prior CT.  Findings may relate to pancreatic necrosis with pancreatic pseudocysts with infected  pseudocyst    Skin cancer     skin cancer    Sleep apnea 8/26/2015    Status post bariatric surgery 1/11/2016    Type 2 diabetes mellitus, with long-term current use of insulin 5/8/2015       Past Surgical History:   Procedure Laterality Date    ANGIOGRAPHY N/A 6/28/2019    Procedure: ANGIOGRAM-PV STENT;  Surgeon: Ewa Diagnostic Provider;  Location: [facility] OR;  Service: Radiology;  Laterality: N/A;    ANGIOPLASTY      total x5 stents    COLONOSCOPY N/A 10/6/2015    Procedure: COLONOSCOPY;  Surgeon: Shekhar Richards MD;  Location: [facility] ENDO (2ND FLR);  Service: Endoscopy;  Laterality: N/A;  BMI over 55/2nd floor case    5 day hold Plavix, Dr Kwadwo Arroyo    CORONARY ANGIOGRAPHY Right 3/20/2019    Procedure: ANGIOGRAM, CORONARY ARTERY;  Surgeon: Bob Duque MD;  Location: [facility] CATH LAB;  Service: Cardiology;  Laterality: Right;    CORONARY ARTERY BYPASS GRAFT  2017    x3    CORONARY BYPASS GRAFT ANGIOGRAPHY  3/20/2019    Procedure: Bypass graft study;  Surgeon: Bob Duque MD;  Location: [facility] CATH LAB;  Service: Cardiology;;    CYST REMOVAL      ESOPHAGOGASTRODUODENOSCOPY N/A 7/8/2019    Procedure: ESOPHAGOGASTRODUODENOSCOPY (EGD);  Surgeon: Huan Brumfield MD;  Location: [facility] ENDO;  Service: Endoscopy;  Laterality: N/A;    GASTRECTOMY      INSERTION OF DIALYSIS CATHETER N/A 5/17/2019    Procedure: pleurx;  Surgeon: Shashank Diagnostic Provider;  [facility];  Service: General;  Laterality: N/A;  Room 188/[facility]    KNEE ARTHROSCOPY      perianal surgery      perianal cyst removed       Review of patient's allergies indicates:  No Known Allergies    No current facility-administered medications on file prior to encounter.      Current Outpatient Medications on File Prior to Encounter   Medication Sig    apixaban (ELIQUIS) 5 mg Tab Take 1 tablet (5 mg total) by mouth 2 (two) times daily.    cyanocobalamin, vitamin B-12, 500 mcg Subl Place 1 tablet under the tongue every Monday.   "   ferrous sulfate (FEOSOL ORAL) Take by mouth.    furosemide (LASIX) 20 MG tablet Take 1 tablet (20 mg total) by mouth 2 (two) times daily.    insulin aspart U-100 (NOVOLOG) 100 unit/mL injection Inject 10 Units into the skin 3 (three) times daily before meals.     insulin detemir U-100 (LEVEMIR FLEXTOUCH) 100 unit/mL (3 mL) SubQ InPn pen Inject 20 Units into the skin every evening.    blood sugar diagnostic Strp Test strips to use with meter covered by insurance to check blood sugars twice daily. Insulin dependent diabetic.    blood-glucose meter Misc Glucometer covered by insurance to check blood sugars at home.  Insulin dependent diabetic.    doxycycline (VIBRAMYCIN) 100 MG Cap TAKE 1 CAPSULE BY MOUTH TWICE A DAY UNTIL INFECTION IS CLEAR    glucagon, human recombinant, (GLUCAGON EMERGENCY KIT, HUMAN,) 1 mg SolR Inject 1 mg into the muscle as needed (For severely low sugar).    ketoconazole (NIZORAL) 2 % cream APPLY TO FACE AND CHEST TWICE A DAY    lancets Misc Lancets to use with meter covered by insurance to check blood sugars twice daily.  Insulin dependent diabetic.    lipase-protease-amylase (CREON) 36,000-114,000- 180,000 unit CpDR Take 1 capsule by mouth 3 (three) times daily.    metoprolol succinate (TOPROL-XL) 25 MG 24 hr tablet Take 1 tablet (25 mg total) by mouth once daily.    pen needle, diabetic (INSUPEN) 32 gauge x 1/4" Ndle 1 each by Misc.(Non-Drug; Combo Route) route 4 (four) times daily.    ramelteon (ROZEREM) 8 mg tablet Take 1 tablet (8 mg total) by mouth nightly as needed for Insomnia.    triamcinolone acetonide 0.1% (KENALOG) 0.1 % cream APPLY TO AFFECTED AREA 2-3 TIMES A DAY     Family History     Problem Relation (Age of Onset)    Cancer Mother, Father, Paternal Grandfather, Brother    Diabetes Maternal Grandmother    Heart disease Father    No Known Problems Paternal Grandmother    Obesity Sister    Parkinsonism Brother    Stroke Maternal Grandfather        Tobacco Use    " Smoking status: Former Smoker     Packs/day: 2.00     Years: 30.00     Pack years: 60.00     Types: Cigarettes     Last attempt to quit: 2/1/2005     Years since quitting: 15.0    Smokeless tobacco: Never Used   Substance and Sexual Activity    Alcohol use: No     Comment: started ~2014, reports 1 shot daily, max 3 shots daily, vague about alcohol consumption. Last drink 9/2018    Drug use: No    Sexual activity: Not on file     Review of Systems   Constitution: Positive for fever. Negative for diaphoresis.   HENT: Negative.    Eyes: Negative.    Cardiovascular: Positive for leg swelling (lymphedema ). Negative for chest pain, dyspnea on exertion, irregular heartbeat, near-syncope, orthopnea, palpitations, paroxysmal nocturnal dyspnea and syncope.   Respiratory: Negative.  Negative for cough and shortness of breath.    Endocrine: Negative.    Hematologic/Lymphatic: Negative.    Skin: Negative.    Musculoskeletal: Positive for arthritis.   Gastrointestinal: Positive for abdominal pain. Negative for nausea and vomiting.   Genitourinary: Negative.    Neurological: Negative.    Psychiatric/Behavioral: Negative.    Allergic/Immunologic: Negative.      Objective:     Vital Signs (Most Recent):  Temp: 98.3 °F (36.8 °C) (02/26/20 1130)  Pulse: 75 (02/26/20 1130)  Resp: (!) 32 (02/26/20 1130)  BP: (!) 74/48 (02/26/20 1130)  SpO2: 100 % (02/26/20 1130) Vital Signs (24h Range):  Temp:  [97.7 °F (36.5 °C)-102.9 °F (39.4 °C)] 98.3 °F (36.8 °C)  Pulse:  [] 75  Resp:  [10-32] 32  SpO2:  [93 %-100 %] 100 %  BP: ()/(48-93) 74/48     Weight: 112.9 kg (248 lb 14.4 oz)  Body mass index is 38.98 kg/m².    SpO2: 100 %  O2 Device (Oxygen Therapy): room air      Intake/Output Summary (Last 24 hours) at 2/26/2020 1416  Last data filed at 2/26/2020 1307  Gross per 24 hour   Intake 6749.32 ml   Output 455 ml   Net 6294.32 ml       Lines/Drains/Airways     Peripheral Intravenous Line                 Peripheral IV - Single Lumen  02/24/20 2358 20 G Right;Lateral Wrist 1 day         Peripheral IV - Single Lumen 02/26/20 0555 20 G Anterior;Left Antecubital less than 1 day                Physical Exam   Constitutional: He is oriented to person, place, and time. He appears well-developed and well-nourished. No distress.   HENT:   Head: Atraumatic.   Eyes: Right eye exhibits no discharge. Left eye exhibits no discharge.   Neck: No JVD present.   Cardiovascular: Normal rate and regular rhythm. Exam reveals no gallop and no friction rub.   No murmur heard.  Pulmonary/Chest: Effort normal and breath sounds normal.   Abdominal: Soft. There is tenderness.   Musculoskeletal: He exhibits edema.   Neurological: He is alert and oriented to person, place, and time.   Skin: Skin is warm and dry. He is not diaphoretic.   Psychiatric: He has a normal mood and affect. His behavior is normal. Judgment and thought content normal.       Significant Labs:   Blood Culture:   Recent Labs   Lab 02/25/20  1558   LABBLOO Gram stain awilda bottle: Gram negative rods  Results called to and read back by: Kylah Bragg RN  02/26/2020    06:34   , BMP:   Recent Labs   Lab 02/25/20 0618 02/26/20  0214   * 228*   * 134*   K 4.7 3.7    103   CO2 19* 20*   BUN 23 28*   CREATININE 1.4 2.0*   CALCIUM 7.8* 7.7*   MG 1.7 1.5*   , CMP   Recent Labs   Lab 02/25/20 0618 02/26/20  0214   * 134*   K 4.7 3.7    103   CO2 19* 20*   * 228*   BUN 23 28*   CREATININE 1.4 2.0*   CALCIUM 7.8* 7.7*   PROT 5.7* 5.2*   ALBUMIN 2.5* 2.2*   BILITOT 0.6 1.2*   ALKPHOS 268* 276*   AST 26 38   ALT 41 39   ANIONGAP 10 11   ESTGFRAFRICA >60 39*   EGFRNONAA 52* 34*   , CBC   Recent Labs   Lab 02/25/20 0618 02/25/20  1915 02/26/20  0214   WBC 13.04* 10.18 22.89*   HGB 12.5* 11.5* 12.0*   HCT 38.1* 35.6* 36.3*   * 114* 147*   , INR   Recent Labs   Lab 02/25/20  1826   INR 1.0   , Lipid Panel No results for input(s): CHOL, HDL, LDLCALC, TRIG, CHOLHDL in the  last 48 hours., Troponin No results for input(s): TROPONINI in the last 48 hours. and All pertinent lab results from the last 24 hours have been reviewed.    Significant Imaging: Echocardiogram:   Transthoracic echo (TTE) complete (Cupid Only):   Results for orders placed or performed during the hospital encounter of 06/25/19   Transthoracic echo (TTE) 2D with Color Flow   Result Value Ref Range    Ascending aorta 3.76 cm    STJ 3.22 cm    AV mean gradient 2 mmHg    Ao peak mini 0.92 m/s    Ao VTI 15.92 cm    IVS 0.75 0.6 - 1.1 cm    LA size 3.94 cm    Left Atrium Major Axis 4.71 cm    Left Atrium Minor Axis 4.92 cm    LVIDD 4.76 3.5 - 6.0 cm    LVIDS 4.17 (A) 2.1 - 4.0 cm    LVOT diameter 1.98 cm    LVOT peak VTI 13.66 cm    PW 0.90 0.6 - 1.1 cm    RA Major Axis 3.27 cm    RA Width 2.59 cm    RVDD 3.13 cm    Sinus 3.47 cm    TAPSE 1.81 cm    TDI LATERAL 0.14 m/s    TDI SEPTAL 0.07 m/s    LA WIDTH 2.26 cm    LV Diastolic Volume 105.39 mL    LV Systolic Volume 77.15 mL    LVOT peak mini 0.58 m/s    FS 12 %    LA volume 36.43 cm3    LV mass 130.00 g    Left Ventricle Relative Wall Thickness 0.38 cm    AV valve area 2.64 cm2    AV Velocity Ratio 0.63     AV index (prosthetic) 0.86     Mean e' 0.1 m/s    LVOT area 3.1 cm2    LVOT stroke volume 42.04 cm3    AV peak gradient 3 mmHg    LV Systolic Volume Index 33.4 mL/m2    LV Diastolic Volume Index 45.66 mL/m2    LA Volume Index 15.8 mL/m2    LV Mass Index 56 g/m2    BSA 2.41 m2    Right Atrial Pressure (from IVC) 3 mmHg    Narrative    · Mildly decreased left ventricular systolic function. The estimated   ejection fraction is 45-50%  · Left ventricular diastolic dysfunction.  · Normal right ventricular systolic function.  · Normal central venous pressure (3 mm Hg).  · Extremely technically challenging study, poor endocardial visualization,   would recommend repeating with contrast if additional information is   desired.

## 2020-02-26 NOTE — SUBJECTIVE & OBJECTIVE
Interval History: awake and alert, requesting for food.   MET yesterday improved but later transfer to ICU due to hypotension now on pressor  Now with ELIEZER, continue fluid resuscitation   Blood cx with GNR. repeat blood cx on 2/27, meanwhile de-escalate abx to Zosyn  Plan for MRI with MRCP today.   surgical consult - Dr. Rowe     Review of Systems   Constitutional: Negative for chills and fever.   Respiratory: Negative for apnea, shortness of breath and wheezing.    Cardiovascular: Negative for chest pain.   Gastrointestinal: Negative for abdominal distention, diarrhea and nausea.   Genitourinary: Negative for dysuria, enuresis and hematuria.   Musculoskeletal: Negative for arthralgias and back pain.   Neurological: Negative for syncope, light-headedness and headaches.   Psychiatric/Behavioral: Negative for behavioral problems.     Objective:     Vital Signs (Most Recent):  Temp: 98 °F (36.7 °C) (02/26/20 0745)  Pulse: 75 (02/26/20 0854)  Resp: (!) 22 (02/26/20 0745)  BP: 97/60 (02/26/20 0854)  SpO2: 98 % (02/26/20 0745) Vital Signs (24h Range):  Temp:  [97.7 °F (36.5 °C)-102.9 °F (39.4 °C)] 98 °F (36.7 °C)  Pulse:  [] 75  Resp:  [10-32] 22  SpO2:  [93 %-100 %] 98 %  BP: ()/(49-93) 97/60     Weight: 112.9 kg (248 lb 14.4 oz)  Body mass index is 38.98 kg/m².    Intake/Output Summary (Last 24 hours) at 2/26/2020 1039  Last data filed at 2/26/2020 0630  Gross per 24 hour   Intake 6649.32 ml   Output 580 ml   Net 6069.32 ml      Physical Exam   Constitutional: He is oriented to person, place, and time. He appears well-developed and well-nourished.   HENT:   Head: Normocephalic and atraumatic.   Neck: Neck supple.   Cardiovascular: Normal rate, regular rhythm and normal heart sounds. Exam reveals no gallop and no friction rub.   No murmur heard.  Pulmonary/Chest: Effort normal and breath sounds normal.   Abdominal: Soft. Bowel sounds are normal. There is no tenderness.   Musculoskeletal: Normal range of  motion. He exhibits no edema or tenderness.   + edema   Neurological: He is alert and oriented to person, place, and time.   Skin: Skin is warm. Capillary refill takes less than 2 seconds.   Psychiatric: His speech is normal and behavior is normal. His mood appears not anxious. Cognition and memory are normal. He does not exhibit a depressed mood.       Significant Labs:   Bilirubin:   Recent Labs   Lab 02/21/20  1157 02/23/20  1238 02/24/20  0542 02/25/20 0618 02/26/20  0214   BILITOT 0.5 0.8 0.8 0.6 1.2*     CBC:   Recent Labs   Lab 02/25/20 0618 02/25/20 1915 02/26/20 0214   WBC 13.04* 10.18 22.89*   HGB 12.5* 11.5* 12.0*   HCT 38.1* 35.6* 36.3*   * 114* 147*     CMP:   Recent Labs   Lab 02/25/20 0618 02/26/20 0214   * 134*   K 4.7 3.7    103   CO2 19* 20*   * 228*   BUN 23 28*   CREATININE 1.4 2.0*   CALCIUM 7.8* 7.7*   PROT 5.7* 5.2*   ALBUMIN 2.5* 2.2*   BILITOT 0.6 1.2*   ALKPHOS 268* 276*   AST 26 38   ALT 41 39   ANIONGAP 10 11   EGFRNONAA 52* 34*     Cardiac Markers: No results for input(s): CKMB, MYOGLOBIN, BNP, TROPISTAT in the last 48 hours.  Lactic Acid:   Recent Labs   Lab 02/25/20  1559 02/25/20 1915 02/26/20 0214   LACTATE 4.8* 4.7* 2.7*     Lipase:   Recent Labs   Lab 02/25/20 0618   LIPASE 4     Magnesium:   Recent Labs   Lab 02/25/20 0618 02/26/20 0214   MG 1.7 1.5*       Significant Imaging: I have reviewed all pertinent imaging results/findings within the past 24 hours.

## 2020-02-26 NOTE — CONSULTS
NEPHROLOGY CONSULT NOTE    HPI & INTERVAL HISTORY:    Past Medical History:   Diagnosis Date    Alcohol abuse     Anasarca 1/28/2019    Arthropathy associated with neurological disorder 9/2/2015    Atherosclerosis     Charcot foot due to diabetes mellitus     Chronic combined systolic and diastolic heart failure 01/29/2019 1-28-19 Left VentricleModerate decreased ejection fraction at 30%. Normal left ventricular cavity size. Normal wall thickness observed. Grade I (mild) left ventricular diastolic dysfunction consistent with impaired relaxation. Normal left atrial pressure. Moderate, global hypokinesis(see wall scoring diagram). Right VentricleNormal cavity size, wall thickness and ejection fraction. Wall motion n    Chronic pancreatitis 1/28/2019    Colon polyps     approx 5 yrs ago    Coronary artery disease due to calcified coronary lesion 05/08/2015    5 stents on ASA      Diabetic polyneuropathy associated with type 2 diabetes mellitus 9/2/2015    Diverticulosis 1/28/2019    DM type 2 with diabetic peripheral neuropathy 2/4/2019    Encounter for blood transfusion     Essential hypertension 1/28/2019    Former smoker 8/26/2015    Healed ulcer of left foot on examination 6/20/2017    Hydrocele     approx 1.5 yrs ago    Hypoalbuminemia 2/4/2019    Lymphedema of both lower extremities 1/29/2019    Mixed hyperlipidemia 5/8/2015    Morbid obesity with BMI of 50.0-59.9, adult 5/8/2015    Onychomycosis of multiple toenails with type 2 diabetes mellitus and peripheral neuropathy 6/20/2017    Perianal cyst     approx 2 yrs ago    Pseudocyst of pancreas 1/28/2019 1-28-19 Liver has a cirrhotic morphology with no focal lesions.  Significant interval increase in ascites when compared to prior exam which may account for patient's abdominal distension.  Hypodense air-fluid collection along the body of the pancreas which is slightly smaller when compared to prior CT.  Findings may relate to  pancreatic necrosis with pancreatic pseudocysts with infected pseudocyst    Skin cancer     skin cancer    Sleep apnea 8/26/2015    Status post bariatric surgery 1/11/2016    Type 2 diabetes mellitus, with long-term current use of insulin 5/8/2015      Past Surgical History:   Procedure Laterality Date    ANGIOGRAPHY N/A 6/28/2019    Procedure: ANGIOGRAM-PV STENT;  Surgeon: [facility] Diagnostic Provider;  Location: [facility] OR;  Service: Radiology;  Laterality: N/A;    ANGIOPLASTY      total x5 stents    COLONOSCOPY N/A 10/6/2015    Procedure: COLONOSCOPY;  Surgeon: Shekhar Richards MD;  Location: [facility] ENDO (2ND FLR);  Service: Endoscopy;  Laterality: N/A;  BMI over 55/2nd floor case    5 day hold Plavix, Dr Kwadwo Arroyo    CORONARY ANGIOGRAPHY Right 3/20/2019    Procedure: ANGIOGRAM, CORONARY ARTERY;  Surgeon: Bob Duque MD;  Location: [facility] CATH LAB;  Service: Cardiology;  Laterality: Right;    CORONARY ARTERY BYPASS GRAFT  2017    x3    CORONARY BYPASS GRAFT ANGIOGRAPHY  3/20/2019    Procedure: Bypass graft study;  Surgeon: Bob Duque MD;  Location: [facility] CATH LAB;  Service: Cardiology;;    CYST REMOVAL      ESOPHAGOGASTRODUODENOSCOPY N/A 7/8/2019    Procedure: ESOPHAGOGASTRODUODENOSCOPY (EGD);  Surgeon: Huan Brumfield MD;  Location: [facility] ENDO;  Service: Endoscopy;  Laterality: N/A;    GASTRECTOMY      INSERTION OF DIALYSIS CATHETER N/A 5/17/2019    Procedure: pleurx;  Surgeon: [facility] Diagnostic Provider;  Location: [facility] OR 2ND FLR;  Service: General;  Laterality: N/A;  Room 188/[facility]    KNEE ARTHROSCOPY      perianal surgery      perianal cyst removed      Review of patient's allergies indicates:  No Known Allergies   Medications Prior to Admission   Medication Sig Dispense Refill Last Dose    apixaban (ELIQUIS) 5 mg Tab Take 1 tablet (5 mg total) by mouth 2 (two) times daily. 60 tablet 11 2/23/2020 at Unknown time    cyanocobalamin, vitamin B-12, 500 mcg Subl Place 1 tablet under the  "tongue every Monday.    Past Week at Unknown time    ferrous sulfate (FEOSOL ORAL) Take by mouth.   2/23/2020 at Unknown time    furosemide (LASIX) 20 MG tablet Take 1 tablet (20 mg total) by mouth 2 (two) times daily. 60 tablet 11 2/23/2020 at Unknown time    insulin aspart U-100 (NOVOLOG) 100 unit/mL injection Inject 10 Units into the skin 3 (three) times daily before meals.    2/23/2020 at Unknown time    insulin detemir U-100 (LEVEMIR FLEXTOUCH) 100 unit/mL (3 mL) SubQ InPn pen Inject 20 Units into the skin every evening. 3 mL 12 2/23/2020 at Unknown time    blood sugar diagnostic Strp Test strips to use with meter covered by insurance to check blood sugars twice daily. Insulin dependent diabetic. 200 strip 11 Taking    blood-glucose meter Misc Glucometer covered by insurance to check blood sugars at home.  Insulin dependent diabetic. 1 each 0 Taking    doxycycline (VIBRAMYCIN) 100 MG Cap TAKE 1 CAPSULE BY MOUTH TWICE A DAY UNTIL INFECTION IS CLEAR  2 Not Taking    glucagon, human recombinant, (GLUCAGON EMERGENCY KIT, HUMAN,) 1 mg SolR Inject 1 mg into the muscle as needed (For severely low sugar). 1 each 2     ketoconazole (NIZORAL) 2 % cream APPLY TO FACE AND CHEST TWICE A DAY  5 Taking    lancets Misc Lancets to use with meter covered by insurance to check blood sugars twice daily.  Insulin dependent diabetic. 200 each 11 Taking    lipase-protease-amylase (CREON) 36,000-114,000- 180,000 unit CpDR Take 1 capsule by mouth 3 (three) times daily. 270 capsule 3 Taking    metoprolol succinate (TOPROL-XL) 25 MG 24 hr tablet Take 1 tablet (25 mg total) by mouth once daily. 30 tablet 11 More than a month at Unknown time    pen needle, diabetic (INSUPEN) 32 gauge x 1/4" Ndle 1 each by Misc.(Non-Drug; Combo Route) route 4 (four) times daily. 360 each 3 Taking    ramelteon (ROZEREM) 8 mg tablet Take 1 tablet (8 mg total) by mouth nightly as needed for Insomnia. 30 tablet 11 Taking    triamcinolone " acetonide 0.1% (KENALOG) 0.1 % cream APPLY TO AFFECTED AREA 2-3 TIMES A DAY  4 Taking       Social History     Socioeconomic History    Marital status:      Spouse name: Not on file    Number of children: Not on file    Years of education: Not on file    Highest education level: Not on file   Occupational History    Not on file   Social Needs    Financial resource strain: Not on file    Food insecurity:     Worry: Not on file     Inability: Not on file    Transportation needs:     Medical: Not on file     Non-medical: Not on file   Tobacco Use    Smoking status: Former Smoker     Packs/day: 2.00     Years: 30.00     Pack years: 60.00     Types: Cigarettes     Last attempt to quit: 2/1/2005     Years since quitting: 15.0    Smokeless tobacco: Never Used   Substance and Sexual Activity    Alcohol use: No     Comment: started ~2014, reports 1 shot daily, max 3 shots daily, vague about alcohol consumption. Last drink 9/2018    Drug use: No    Sexual activity: Not on file   Lifestyle    Physical activity:     Days per week: Not on file     Minutes per session: Not on file    Stress: Not on file   Relationships    Social connections:     Talks on phone: Not on file     Gets together: Not on file     Attends Sikhism service: Not on file     Active member of club or organization: Not on file     Attends meetings of clubs or organizations: Not on file     Relationship status: Not on file   Other Topics Concern    Not on file   Social History Narrative    Not on file        MEDS   insulin detemir U-100  15 Units Subcutaneous Daily    lipase-protease-amylase  1 capsule Oral TID    metoprolol succinate  25 mg Oral Daily    piperacillin-tazobactam (ZOSYN) IVPB  4.5 g Intravenous Q8H    vancomycin (VANCOCIN) IVPB  1,000 mg Intravenous Q12H          CONTINOUS INFUSIONS:      Intake/Output Summary (Last 24 hours) at 2/26/2020 1009  Last data filed at 2/26/2020 0630  Gross per 24 hour   Intake 6649.32  ml   Output 580 ml   Net 6069.32 ml        HEMODYNAMICS:    Temp:  [97.7 °F (36.5 °C)-102.9 °F (39.4 °C)] 98 °F (36.7 °C)  Pulse:  [] 75  Resp:  [10-32] 22  SpO2:  [93 %-100 %] 98 %  BP: ()/(49-93) 97/60     Admitted with chest heaviness, epigastric, substernal pain,   right upper quadrant,  right flank pain.    Nausea, vomiting 2 times.   No diarrhea.  Fever.  No dysuria.  No hematuria.  Weight loss.  Denies NSAIDs.    Cards: pulse 75  Pul: diminished breath sounds  Abdomen soft, right  side - tender to palpation  Ext: edema   Skin:dry     LABS   Lab Results   Component Value Date    WBC 22.89 (H) 02/26/2020    HGB 12.0 (L) 02/26/2020    HCT 36.3 (L) 02/26/2020    MCV 89 02/26/2020     (L) 02/26/2020        Recent Labs   Lab 02/26/20  0214   *   CALCIUM 7.7*   ALBUMIN 2.2*   PROT 5.2*   *   K 3.7   CO2 20*      BUN 28*   CREATININE 2.0*   ALKPHOS 276*   ALT 39   AST 38   BILITOT 1.2*      Lab Results   Component Value Date    CALCIUM 7.7 (L) 02/26/2020    CAION 1.13 03/15/2019    PHOS 2.3 (L) 02/26/2020      Lab Results   Component Value Date    IRON 25 (L) 03/15/2019    TIBC 55 (L) 03/15/2019    FERRITIN 270 03/15/2019        ABG  No results for input(s): PH, PO2, PCO2, HCO3, BE in the last 168 hours.      IMAGING:  CXR    ASSESSMENT / PLAN  US  Partially fluid-filled gallbladder with aggregated sludge.  Nonspecific borderline wall thickening potentially reactive in the setting of underlying hepatocellular dysfunction though inflammatory process not entirely excluded.  Mildly dilated extrahepatic duct without intrahepatic dilatation.    Further evaluation with dedicated MRI with MRCP or ERCP could be performed.    Mild perihepatic ascites.  WBC 22.9  On antibiotics.    ELIEZER/CKD 4  Diabetes Mellitus  Diabetic Retinopathy  CAD   cc  UA protein 2+, blood trace  US  Right kidney: 11.2 cm. No hydronephrosis.  Left kidney: 11.4 cm. No hydronephrosis  No hydronephrosis.  Normal  bilateral resistive indices.    Hyponatremia  Metabolic acidosis  Lactic acid 2.7  Metabolic bone disease  Hypophosphatemia  Hypomagnesemia  Replace electrolytes prn  Corrected calcium approximately 9  Poor nutrition  Albumin 2.2  Hb 12  Hypotension  Blood pressure 97/60  Echo 2019  · Mildly decreased left ventricular systolic function. The estimated ejection fraction is 45-50%  · Left ventricular diastolic dysfunction.  · Normal right ventricular systolic function.  · Normal central venous pressure (3 mm Hg).  ·    CXR No focal consolidation, pleural effusion or pneumothorax    Avoid nephrotoxic agents, hypotension, hypovolemia  Weight daily  I and O     ·    LR 1 liter - 1 0 hours  Will follow up

## 2020-02-26 NOTE — PROGRESS NOTES
Anesthesia called to place IV, Pt inadvertently taken out. Nurse attempt x2 with no success.     0500 Called anesthesia back to see if going to place ling unable to start IV zosyn. Went to wrong room. Clarified rm 257.

## 2020-02-26 NOTE — ASSESSMENT & PLAN NOTE
CAD s/p PCI (x5) with subsequent CABG x3v (~2017 at [facility]; LIMA-LAD [patent], SVG-OM [patent], SVG-RCA [occluded; L->R collaterals from LAD to PDA  EKG ST without acute ischemic changes  Troponin negative  LVEF 45-50%   Hold BB given pressor use   Resume asa post op  No statin given elevated LFTs    Pt has no active cardiac condition (ACS/USA, decompenstated CHF, significant arrhythmias or severe valvular disease) and can achieve > 4 METS.  Pt does not require further cardiac evaluation prior to undergoing surgical procedure. These recommendations follow the 2014 ACC/AHA Guideline on Perioperative Cardiovascular Evaluation and Management of Patients Undergoing Noncardiac Surgery. (JACC Vol. 64 No. 22, Dec 9, 2014, pp m64-u053).

## 2020-02-26 NOTE — PROGRESS NOTES
[facility] Hospital Medicine  Progress Note    Patient Name: Jian Arrieta  MRN: [id number]  Patient Class: IP- Inpatient   Admission Date: 2/23/2020  Length of Stay: 0 days  Attending Physician: Marialuisa Ybarra*  Primary Care Provider: Leon Barajas DO        Subjective:     Principal Problem:Sepsis        HPI:  Jian Arrieta is a 63 y/o M with h/o CAD, chronic pancreatitis, HTN, chronic Lymphedema, former smoker, morbid obesity, DM II, CABG in x3 years ago (2017), last stents were placed x2 years ago (2018). present with 2 days history of chest heaviness and pain located in the epigastric area, pain is about 2-3 radiating to the back. Pain is exacerbated with exertion like riding lawnmower. There is an associated right flank pain. He denies chills, fever, nausea, or vomiting.     Overview/Hospital Course:  No notes on file    Interval History: awake and alert, requesting for food.   MET yesterday improved but later transfer to ICU due to hypotension now on pressor  Now with ELIEZER, continue fluid resuscitation   Blood cx with GNR. repeat blood cx on 2/27, meanwhile de-escalate abx to Zosyn  Plan for MRI with MRCP today.   surgical consult - Dr. Rowe     Review of Systems   Constitutional: Negative for chills and fever.   Respiratory: Negative for apnea, shortness of breath and wheezing.    Cardiovascular: Negative for chest pain.   Gastrointestinal: Negative for abdominal distention, diarrhea and nausea.   Genitourinary: Negative for dysuria, enuresis and hematuria.   Musculoskeletal: Negative for arthralgias and back pain.   Neurological: Negative for syncope, light-headedness and headaches.   Psychiatric/Behavioral: Negative for behavioral problems.     Objective:     Vital Signs (Most Recent):  Temp: 98 °F (36.7 °C) (02/26/20 0745)  Pulse: 75 (02/26/20 0854)  Resp: (!) 22 (02/26/20 0745)  BP: 97/60 (02/26/20 0854)  SpO2: 98 % (02/26/20 0745) Vital Signs (24h Range):  Temp:   [97.7 °F (36.5 °C)-102.9 °F (39.4 °C)] 98 °F (36.7 °C)  Pulse:  [] 75  Resp:  [10-32] 22  SpO2:  [93 %-100 %] 98 %  BP: ()/(49-93) 97/60     Weight: 112.9 kg (248 lb 14.4 oz)  Body mass index is 38.98 kg/m².    Intake/Output Summary (Last 24 hours) at 2/26/2020 1039  Last data filed at 2/26/2020 0630  Gross per 24 hour   Intake 6649.32 ml   Output 580 ml   Net 6069.32 ml      Physical Exam   Constitutional: He is oriented to person, place, and time. He appears well-developed and well-nourished.   HENT:   Head: Normocephalic and atraumatic.   Neck: Neck supple.   Cardiovascular: Normal rate, regular rhythm and normal heart sounds. Exam reveals no gallop and no friction rub.   No murmur heard.  Pulmonary/Chest: Effort normal and breath sounds normal.   Abdominal: Soft. Bowel sounds are normal. There is no tenderness.   Musculoskeletal: Normal range of motion. He exhibits no edema or tenderness.   + edema   Neurological: He is alert and oriented to person, place, and time.   Skin: Skin is warm. Capillary refill takes less than 2 seconds.   Psychiatric: His speech is normal and behavior is normal. His mood appears not anxious. Cognition and memory are normal. He does not exhibit a depressed mood.       Significant Labs:   Bilirubin:   Recent Labs   Lab 02/21/20  1157 02/23/20  1238 02/24/20  0542 02/25/20  0618 02/26/20  0214   BILITOT 0.5 0.8 0.8 0.6 1.2*     CBC:   Recent Labs   Lab 02/25/20 0618 02/25/20  1915 02/26/20  0214   WBC 13.04* 10.18 22.89*   HGB 12.5* 11.5* 12.0*   HCT 38.1* 35.6* 36.3*   * 114* 147*     CMP:   Recent Labs   Lab 02/25/20  0618 02/26/20  0214   * 134*   K 4.7 3.7    103   CO2 19* 20*   * 228*   BUN 23 28*   CREATININE 1.4 2.0*   CALCIUM 7.8* 7.7*   PROT 5.7* 5.2*   ALBUMIN 2.5* 2.2*   BILITOT 0.6 1.2*   ALKPHOS 268* 276*   AST 26 38   ALT 41 39   ANIONGAP 10 11   EGFRNONAA 52* 34*     Cardiac Markers: No results for input(s): CKMB, MYOGLOBIN, BNP,  TROPISTAT in the last 48 hours.  Lactic Acid:   Recent Labs   Lab 02/25/20  1559 02/25/20  1915 02/26/20  0214   LACTATE 4.8* 4.7* 2.7*     Lipase:   Recent Labs   Lab 02/25/20  0618   LIPASE 4     Magnesium:   Recent Labs   Lab 02/25/20  0618 02/26/20  0214   MG 1.7 1.5*       Significant Imaging: I have reviewed all pertinent imaging results/findings within the past 24 hours.      Assessment/Plan:      * Sepsis  Bacteremia  Blood cx with GNR, on Vanc and Zosyn- de-escalate to Zosyn  Repeat blood cx on 2/27        Right lower quadrant abdominal pain  RUQ USS  PPI      Chest pain  Coronary artery disease due to calcified coronary lesion  Trend troponin    Chronic acquired lymphedema  chronic      ELIEZER (acute kidney injury)  Chronic kidney disease, stage 3  Avoid nephrotoxic agents  Renally dose medication  Continue IVF   Strict input/output      Type 2 diabetes mellitus with diabetic neuropathy, with long-term current use of insulin  Hyperglycemia  levemir  Low dose SSI  accucheck  Diabetic diet      Chronic diastolic heart failure  Stable.      Chronic pancreatitis  Fatty liver disease, nonalcoholic  Continue Creon  Monitor   Abdominal USS reported mildly dilated extrahepatic duct without intrahepatic dilatation.  Although, this appears increased from most recent liver Doppler from July 2019, overall findings appear similar to that of comparison CT from June 2019.  Further evaluation with dedicated MRI with MRCP or ERCP could be performed for further evaluation.  Additionally, correlation with biliary serology recommended  Consult GI- appreciates rec's - MRI with MRCP eval. Will likely need surgical consult - Dr. Rowe   Awaits MRI results      Coronary artery disease due to calcified coronary lesion  Continue Eliquis        VTE Risk Mitigation (From admission, onward)         Ordered     Place sequential compression device  Until discontinued      02/23/20 6214                Critical care time spent on the  evaluation and treatment of severe organ dysfunction, review of pertinent labs and imaging studies, discussions with consulting providers and discussions with patient/family: 45 minutes.      Marialuisa Ybarra MD  Department of Hospital Medicine   [facility]

## 2020-02-26 NOTE — ASSESSMENT & PLAN NOTE
Bacteremia  Blood cx with GNR, on Vanc and Zosyn- de-escalate to Zosyn  Repeat blood cx on 2/27

## 2020-02-26 NOTE — PLAN OF CARE
Pt AAOx4. NAD. Denies pain. Able to make needs known. Breathing even and unlabored at 100% on RA. Pt currently on  Levo Map 68-70s. Pt only had 30cc of urine output this shift with bladder scan of 35 cc. NP aware, may discuss with day team Nephro consult. Plan for MRI of ABD for gallstones today. Safety maintained. SR up x3. Call light in reach. Family not present at bedside.

## 2020-02-26 NOTE — PROGRESS NOTES
[facility]  Gastroenterology  Progress Note    Patient Name: Jian Arrieta  MRN: [id number]  Admission Date: 2/23/2020  Hospital Length of Stay: 0 days  Code Status: Full Code   Attending Provider: Marialuisa Ybarra*  Consulting Provider: Tanner Mitchell MD  Primary Care Physician: Leon Barajas DO  Principal Problem: Chest pain      Subjective:     Interval History:     Spiked fevers and chills yesterday evening.  Moved to ICU.  Started on abx.  Still with some RUQ and epigastric pain.  Blood cultures with GNRs.    No vomiting. No puritis.    Review of Systems   Constitutional: Positive for activity change, appetite change, chills and fever.   Respiratory: Negative for cough and shortness of breath.    Gastrointestinal: Positive for abdominal pain and nausea. Negative for vomiting.   Neurological: Negative for numbness and headaches.     Objective:     Vital Signs (Most Recent):  Temp: 98 °F (36.7 °C) (02/26/20 0745)  Pulse: 78 (02/26/20 0745)  Resp: (!) 22 (02/26/20 0745)  BP: (!) 95/51 (02/26/20 0745)  SpO2: 98 % (02/26/20 0745) Vital Signs (24h Range):  Temp:  [97.7 °F (36.5 °C)-102.9 °F (39.4 °C)] 98 °F (36.7 °C)  Pulse:  [] 78  Resp:  [10-32] 22  SpO2:  [93 %-100 %] 98 %  BP: ()/(49-93) 95/51     Weight: 112.9 kg (248 lb 14.4 oz) (02/25/20 2330)  Body mass index is 38.98 kg/m².      Intake/Output Summary (Last 24 hours) at 2/26/2020 0853  Last data filed at 2/26/2020 0630  Gross per 24 hour   Intake 6649.32 ml   Output 580 ml   Net 6069.32 ml       Lines/Drains/Airways     Peripheral Intravenous Line                 Peripheral IV - Single Lumen 02/24/20 2358 20 G Right;Lateral Wrist 1 day         Peripheral IV - Single Lumen 02/26/20 0555 20 G Anterior;Left Antecubital less than 1 day                Physical Exam   Constitutional: No distress.   Appears nontoxic   Eyes: Conjunctivae are normal. No scleral icterus.   Pulmonary/Chest: Effort normal. No respiratory  distress.   Abdominal:   TTP in RUQ without rigidity or rebound   Psychiatric: He has a normal mood and affect. His behavior is normal.   Vitals reviewed.      Significant Labs:  Amylase: No results for input(s): AMYLASE in the last 48 hours.  Blood Culture:   Recent Labs   Lab 02/25/20  1558   LABBLOO Gram stain awilda bottle: Gram negative rods  Results called to and read back by: Kylah Bragg RN  02/26/2020    06:34     CBC:   Recent Labs   Lab 02/25/20  0618 02/25/20  1915 02/26/20  0214   WBC 13.04* 10.18 22.89*   HGB 12.5* 11.5* 12.0*   HCT 38.1* 35.6* 36.3*   * 114* 147*     CMP:   Recent Labs   Lab 02/26/20  0214   *   CALCIUM 7.7*   ALBUMIN 2.2*   PROT 5.2*   *   K 3.7   CO2 20*      BUN 28*   CREATININE 2.0*   ALKPHOS 276*   ALT 39   AST 38   BILITOT 1.2*     Coagulation:   Recent Labs   Lab 02/25/20  1826   INR 1.0     CRP: No results for input(s): CRP in the last 48 hours.  ESR: No results for input(s): SEDRATE in the last 48 hours.  H.Pylori Ab IgG: No results for input(s): HPYLORIIGG in the last 48 hours.  Lipase:   Recent Labs   Lab 02/25/20  0618   LIPASE 4         Significant Imaging:  Imaging results within the past 24 hours have been reviewed.    Assessment/Plan:     Dilation of biliary tract  Overnight became septic.  MRCP ordered Monday was never done.    Plan:  EUS + / - ERCP today  Keep NPO  Trend LFTs  Tailor Abx to blood cultures  Ordered for apixiban to be held (last dose PM of 2/25)  Gen surgery consult for cholecystitis            Thank you for your consult. I will follow-up with patient. Please contact us if you have any additional questions.    Tanner Mitchell MD  Gastroenterology  [facility]

## 2020-02-26 NOTE — TRANSFER OF CARE
"Anesthesia Transfer of Care Note    Patient: Jian Arrieta    Procedure(s) Performed: Procedure(s) (LRB):  ULTRASOUND, UPPER GI TRACT, ENDOSCOPIC (N/A)  ERCP (ENDOSCOPIC RETROGRADE CHOLANGIOPANCREATOGRAPHY) (N/A)    Patient location: ICU    Anesthesia Type: MAC    Transport from OR: Transported from OR on room air with adequate spontaneous ventilation    Post pain: adequate analgesia    Post assessment: no apparent anesthetic complications    Post vital signs: stable    Level of consciousness: awake, alert and oriented    Nausea/Vomiting: no nausea/vomiting    Complications: none    Transfer of care protocol was followed      Last vitals:   Visit Vitals  BP (!) 74/48   Pulse 75   Temp 36.8 °C (98.3 °F) (Oral)   Resp (!) 32   Ht 5' 7" (1.702 m)   Wt 112.9 kg (248 lb 14.4 oz)   SpO2 100%   BMI 38.98 kg/m²     "

## 2020-02-26 NOTE — PROGRESS NOTES
Pt attempted to urinate, with only 30cc of dark linda urine out. Pt had 3L LR bolus and elevated Lactic. Requested orders for U/A. NP to place. NP informed of elevated BUN/ Crit with H/O hyrdrocele to testicle and CKD dx. NP to f/u with day shift.

## 2020-02-26 NOTE — SUBJECTIVE & OBJECTIVE
Subjective:     Interval History:     Spiked fevers and chills yesterday evening.  Moved to ICU.  Started on abx.  Still with some RUQ and epigastric pain.  Blood cultures with GNRs.    No vomiting. No puritis.    Review of Systems   Constitutional: Positive for activity change, appetite change, chills and fever.   Respiratory: Negative for cough and shortness of breath.    Gastrointestinal: Positive for abdominal pain and nausea. Negative for vomiting.   Neurological: Negative for numbness and headaches.     Objective:     Vital Signs (Most Recent):  Temp: 98 °F (36.7 °C) (02/26/20 0745)  Pulse: 78 (02/26/20 0745)  Resp: (!) 22 (02/26/20 0745)  BP: (!) 95/51 (02/26/20 0745)  SpO2: 98 % (02/26/20 0745) Vital Signs (24h Range):  Temp:  [97.7 °F (36.5 °C)-102.9 °F (39.4 °C)] 98 °F (36.7 °C)  Pulse:  [] 78  Resp:  [10-32] 22  SpO2:  [93 %-100 %] 98 %  BP: ()/(49-93) 95/51     Weight: 112.9 kg (248 lb 14.4 oz) (02/25/20 2330)  Body mass index is 38.98 kg/m².      Intake/Output Summary (Last 24 hours) at 2/26/2020 0853  Last data filed at 2/26/2020 0630  Gross per 24 hour   Intake 6649.32 ml   Output 580 ml   Net 6069.32 ml       Lines/Drains/Airways     Peripheral Intravenous Line                 Peripheral IV - Single Lumen 02/24/20 2358 20 G Right;Lateral Wrist 1 day         Peripheral IV - Single Lumen 02/26/20 0555 20 G Anterior;Left Antecubital less than 1 day                Physical Exam   Constitutional: No distress.   Appears nontoxic   Eyes: Conjunctivae are normal. No scleral icterus.   Pulmonary/Chest: Effort normal. No respiratory distress.   Abdominal:   TTP in RUQ without rigidity or rebound   Psychiatric: He has a normal mood and affect. His behavior is normal.   Vitals reviewed.      Significant Labs:  Amylase: No results for input(s): AMYLASE in the last 48 hours.  Blood Culture:   Recent Labs   Lab 02/25/20  1558   LABBLOO Gram stain awilda bottle: Gram negative rods  Results called to and  read back by: Kylah Bragg RN  02/26/2020    06:34     CBC:   Recent Labs   Lab 02/25/20 0618 02/25/20  1915 02/26/20 0214   WBC 13.04* 10.18 22.89*   HGB 12.5* 11.5* 12.0*   HCT 38.1* 35.6* 36.3*   * 114* 147*     CMP:   Recent Labs   Lab 02/26/20 0214   *   CALCIUM 7.7*   ALBUMIN 2.2*   PROT 5.2*   *   K 3.7   CO2 20*      BUN 28*   CREATININE 2.0*   ALKPHOS 276*   ALT 39   AST 38   BILITOT 1.2*     Coagulation:   Recent Labs   Lab 02/25/20  1826   INR 1.0     CRP: No results for input(s): CRP in the last 48 hours.  ESR: No results for input(s): SEDRATE in the last 48 hours.  H.Pylori Ab IgG: No results for input(s): HPYLORIIGG in the last 48 hours.  Lipase:   Recent Labs   Lab 02/25/20 0618   LIPASE 4         Significant Imaging:  Imaging results within the past 24 hours have been reviewed.

## 2020-02-26 NOTE — HPI
Jian Arrieta is a 63 y/o M with h/o CAD, chronic pancreatitis, HTN, chronic Lymphedema, former smoker, morbid obesity, DM II, CABG in x3 years ago (2017), last stents were placed x2 years ago (2018). present with 2 days history of chest heaviness and pain located in the epigastric area, pain is about 2-3 radiating to the back. Pain is exacerbated with exertion like riding lawnmower. There is an associated right flank pain. Patient denies any chest pain and can climb a flight of stairs without limitation. Patient denies any CHURCH or other cardiac complaint. He reports that his anginal equivalent is back pain and his presenting symptoms are different.   MET was called yesterday due to hypotension now on pressor, with ELIEZER  Blood cx with GNR.   Plan for MRI with MRCP today.   Dr. Rowe consulted  Cardiology consulted for surgical clearance   Troponin negative, EKG ST without acute ischemic changes

## 2020-02-26 NOTE — PROGRESS NOTES
Pharmacist Renal Dose Adjustment Note    Jian Arrieta is a 64 y.o. male being treated with the medication pip/tazo    Patient Data:    Vital Signs (Most Recent):  Temp: 98 °F (36.7 °C) (02/26/20 0745)  Pulse: 78 (02/26/20 0745)  Resp: (!) 22 (02/26/20 0745)  BP: (!) 95/51 (02/26/20 0745)  SpO2: 98 % (02/26/20 0745)   Vital Signs (72h Range):  Temp:  [96.4 °F (35.8 °C)-102.9 °F (39.4 °C)]   Pulse:  []   Resp:  [10-32]   BP: ()/(49-93)   SpO2:  [93 %-100 %]      Recent Labs   Lab 02/24/20  0542 02/25/20  0618 02/26/20  0214   CREATININE 1.4 1.4 2.0*     Serum creatinine: 2 mg/dL (H) 02/26/20 0214  Estimated creatinine clearance: 44.2 mL/min (A)    Medication:pip/tazo 4.5g q12h will be changed to medication:pip/tazo 4.5g q8h     Pharmacist's Name: Harper Mays  Pharmacist's Extension: [phone]

## 2020-02-26 NOTE — INTERVAL H&P NOTE
The patient has been examined and the H&P has been reviewed:    I concur with the findings and changes have been noted since the H&P was written: clinical signs of bacteremia/sepsis. Will plan for emergent EUS+/-ERCP today.    Anesthesia/Surgery risks, benefits and alternative options discussed and understood by patient/family.          Active Hospital Problems    Diagnosis  POA    *Sepsis [A41.9]  Yes    Bacteremia [R78.81]  Yes    Dilation of biliary tract [K83.8]  Yes    Chest pain [R07.9]  Yes    Hyperglycemia [R73.9]  Yes    Right lower quadrant abdominal pain [R10.31]  Yes    Chronic kidney disease, stage 3 [N18.3]  Yes     Chronic    Chronic acquired lymphedema [I89.0]  Yes     Chronic    Fatty liver disease, nonalcoholic [K76.0]  Yes     Chronic    ELIEZER (acute kidney injury) [N17.9]  Yes    Type 2 diabetes mellitus with diabetic neuropathy, with long-term current use of insulin [E11.40, Z79.4]  Not Applicable     Chronic    Chronic diastolic heart failure [I50.32]  Yes     Chronic     1-28-19  Left Ventricle Moderate decreased ejection fraction at 30%. Normal left ventricular cavity size. Normal wall thickness observed. Grade I (mild) left ventricular diastolic dysfunction consistent with impaired relaxation. Normal left atrial pressure. Moderate, global hypokinesis(see wall scoring diagram).   Right Ventricle Normal cavity size, wall thickness and ejection fraction. Wall motion normal.   Left Atrium Normal atrial size. .   Right Atrium Normal atrial size.   Aortic Valve The valve is trileaflet. Aortic valve sclerosis is mild. No stenosis. No regurgitation. Mobility is normal   Mitral Valve Normal valve structure. No stenosis. No regurgitation. Normal leaflet mobility.   Tricuspid Valve Tricuspid valve not well visualized. Trace regurgitation.   Pulmonic Valve The pulmonic valve was not well visualized.   IVC/SVC Inferior vena cava is not well visualized.   Ascending Aorta The aortic root and  ascending aorta are normal in size.   Pericardium No pericardial effusion.           Chronic pancreatitis [K86.1]  Yes     Chronic    Coronary artery disease due to calcified coronary lesion [I25.10, I25.84]  Yes     Chronic     5 stents on ASA          Resolved Hospital Problems   No resolved problems to display.

## 2020-02-26 NOTE — CONSULTS
Neuroendocrine Surgery Consult Note    HPI: Pt is a 65 y/o M w/ hx of PMH morbid obesity (BMI 41), stage 1 hepatic fibrosis (biopsy negative for cirrhosis), chronic pancreatitis and extrahepatic portal vein stenosis with  large volume ascites requiring twice weekly paracenteses in addition to pancreatic pseudocyst as well as CAD s/p PCI X 5 and CABG X 3 who presented to the ED w/ worsening lower chest/epigastric abdominal pain over the past few days.  Pt reports pain is different from previous RUQ abdominal pains.  He reports otherwise tolerating diet and having BMs.  Troponins on admission negative.  GI to take patient for EUS/ERCP today.  No reported fevers, chills, or SOB.  Pt on Eliquis which has been held today.    ROS: see HPI; 14 point ROS otherwise negative    PMHx: DM, CAD, CHF, chronic pacreatitis,   PSHx: gastric sleeve, CABG X 3  Meds: see med rec; on Eliquis  Allergies: NKDA  Fam Hx: non-contributory  Social Hx: quit tobacco    Vitals:    02/26/20 1600   BP: (!) 76/51   Pulse: (!) 150   Resp: (!) 47   Temp:      PE: General: AAO X 3; NAD         HEENT: normocephalic; atraumatic         CV: RRR; no murmurs, rubs, or gallops         Resp: CTA b/l; no wheezes, crackles, or rhonchi         Abdomen: soft; TTP along epigastrum to RUQ; ND         Extremities: motor intact to all extremities    Labs: WBC: 22.9; H/H: 12.0/36.3; Cr: 2.5; T Bili: 1.2; AST: 38; ALT: 39; Alk: 276    Abd US: Partially fluid-filled gallbladder with aggregated sludge.  Nonspecific borderline wall thickening potentially reactive in the setting of underlying hepatocellular dysfunction though inflammatory process not entirely excluded.    Mildly dilated extrahepatic duct without intrahepatic dilatation.  Although, this appears increased from most recent liver Doppler from July 2019, overall findings appear similar to that of comparison CT from June 2019.    A/P: 65 y/o M w/ hx of PMH morbid obesity (BMI 41), stage 1 hepatic fibrosis  (biopsy negative for cirrhosis), chronic pancreatitis and extrahepatic portal vein stenosis with  large volume ascites requiring twice weekly paracenteses in addition to pancreatic pseudocyst as well as CAD s/p PCI X 5 and CABG X 3  - will f/u EUS/ERCP today  - no acute surgical intervention at this time; pt not ideal surgical candidate w/ cardiac issues and currently only one day off of Eliquis; if concern for cholangitis would recommend cholecystostomy tube at this time although pt appears to be improved from events yesterday  - will discuss patient w/ Dr. Rowe and discuss further workup  - would recommend cardiology consult for medical optimization from cardiac standpoint; evaluate if ok to hold Eliquis as well    Dot Davies MD PGY IV

## 2020-02-26 NOTE — NURSING
Pt Blood sugar at 237. Pt refuses insulin until after procedure is completed. Will continue to monitor.

## 2020-02-26 NOTE — ANESTHESIA PREPROCEDURE EVALUATION
02/26/2020  Jian Arrieta is a 64 y.o., male   h/o CAD, chronic pancreatitis, HTN, chronic Lymphedema, former smoker, morbid obesity, DM II, CABG in x3 years ago (2017), last stents were placed x2 years ago (2018). present with 2 days history of chest heaviness and pain located in the epigastric area, pain is about 2-3 radiating to the back. Pain is exacerbated with exertion like riding lawnmower. There is an associated right flank pain.Transferred to ICU from the floor last night with hypotension requiring Levophed. Levophed was turned off this am, according to the ICU nurse.  ETT note 2015: mask vent mod difficulty, Gr 1v with Butcher, short neck, obesity, 1 attempt    Past Medical History:   Diagnosis Date    Alcohol abuse     Anasarca 1/28/2019    Arthropathy associated with neurological disorder 9/2/2015    Atherosclerosis     Charcot foot due to diabetes mellitus     Chronic combined systolic and diastolic heart failure 01/29/2019 1-28-19 Left VentricleModerate decreased ejection fraction at 30%. Normal left ventricular cavity size. Normal wall thickness observed. Grade I (mild) left ventricular diastolic dysfunction consistent with impaired relaxation. Normal left atrial pressure. Moderate, global hypokinesis(see wall scoring diagram). Right VentricleNormal cavity size, wall thickness and ejection fraction. Wall motion n    Chronic pancreatitis 1/28/2019    Colon polyps     approx 5 yrs ago    Coronary artery disease due to calcified coronary lesion 05/08/2015    5 stents on ASA      Diabetic polyneuropathy associated with type 2 diabetes mellitus 9/2/2015    Diverticulosis 1/28/2019    DM type 2 with diabetic peripheral neuropathy 2/4/2019    Encounter for blood transfusion     Essential hypertension 1/28/2019    Former smoker 8/26/2015    Healed ulcer of left foot on examination  6/20/2017    Hydrocele     approx 1.5 yrs ago    Hypoalbuminemia 2/4/2019    Lymphedema of both lower extremities 1/29/2019    Mixed hyperlipidemia 5/8/2015    Morbid obesity with BMI of 50.0-59.9, adult 5/8/2015    Onychomycosis of multiple toenails with type 2 diabetes mellitus and peripheral neuropathy 6/20/2017    Perianal cyst     approx 2 yrs ago    Pseudocyst of pancreas 1/28/2019 1-28-19 Liver has a cirrhotic morphology with no focal lesions.  Significant interval increase in ascites when compared to prior exam which may account for patient's abdominal distension.  Hypodense air-fluid collection along the body of the pancreas which is slightly smaller when compared to prior CT.  Findings may relate to pancreatic necrosis with pancreatic pseudocysts with infected pseudocyst    Skin cancer     skin cancer    Sleep apnea 8/26/2015    Status post bariatric surgery 1/11/2016    Type 2 diabetes mellitus, with long-term current use of insulin 5/8/2015     Past Surgical History:   Procedure Laterality Date    ANGIOGRAPHY N/A 6/28/2019    Procedure: ANGIOGRAM-PV STENT;  Surgeon: Ewa Diagnostic Provider;  Location: [facility] OR;  Service: Radiology;  Laterality: N/A;    ANGIOPLASTY      total x5 stents    COLONOSCOPY N/A 10/6/2015    Procedure: COLONOSCOPY;  Surgeon: Shekhar Richards MD;  Location: [facility] ENDO ([address] [place] [address]);  Service: Endoscopy;  Laterality: N/A;  BMI over 55/2nd floor case    5 day hold Plavix, Dr Kwadwo Arroyo    CORONARY ANGIOGRAPHY Right 3/20/2019    Procedure: ANGIOGRAM, CORONARY ARTERY;  Surgeon: Bob Duque MD;  Location: [facility] CATH LAB;  Service: Cardiology;  Laterality: Right;    CORONARY ARTERY BYPASS GRAFT  2017    x3    CORONARY BYPASS GRAFT ANGIOGRAPHY  3/20/2019    Procedure: Bypass graft study;  Surgeon: Bob Duque MD;  Location: [facility] CATH LAB;  Service: Cardiology;;    CYST REMOVAL      ESOPHAGOGASTRODUODENOSCOPY N/A 7/8/2019    Procedure:  ESOPHAGOGASTRODUODENOSCOPY (EGD);  Surgeon: Huan Brumfield MD;  Location: [facility];  Service: Endoscopy;  Laterality: N/A;    GASTRECTOMY      INSERTION OF DIALYSIS CATHETER N/A 5/17/2019    Procedure: pleurx;  Surgeon: Ewa Diagnostic Provider;  Location: [facility] OR [address] [place] [address];  Service: General;  Laterality: N/A;  Room 188/[place]    KNEE ARTHROSCOPY      perianal surgery      perianal cyst removed       Wt Readings from Last 3 Encounters:   02/25/20 112.9 kg (248 lb 14.4 oz)   12/06/19 119.3 kg (262 lb 14.4 oz)   11/29/19 122.1 kg (269 lb 2.9 oz)     Temp Readings from Last 3 Encounters:   02/26/20 36.7 °C (98 °F) (Oral)   11/29/19 37 °C (98.6 °F) (Oral)   11/22/19 36.6 °C (97.8 °F) (Oral)     BP Readings from Last 3 Encounters:   02/26/20 97/60   12/06/19 (!) 98/54   11/29/19 122/74     Pulse Readings from Last 3 Encounters:   02/26/20 75   12/06/19 (!) 56   11/29/19 93         Pre-op Assessment    I have reviewed the Patient Summary Reports.     I have reviewed the Nursing Notes.   I have reviewed the Medications.     Review of Systems  Anesthesia Hx:  No problems with previous Anesthesia    Social:  Former Smoker, Alcohol Use    Hematology/Oncology:         -- Anemia:   Cardiovascular:   Exercise tolerance: poor Hypertension, well controlled Past MI CAD  CABG/stent (3V CABG)  CHF (recovered EF 55-60%) PVD hyperlipidemia CHURCH NYHA Classification III ECG has been reviewed. EKG: SR w/ fusion complexes, anterolateral q waves    METS <4 2/2 to CHURCH   Pulmonary:   Sleep Apnea, CPAP    Renal/:   Chronic Renal Disease, CRI CKD 4   Hepatic/GI:   Liver Disease, (Fatty liver, hepatic fibrosis)    Neurological:   Peripheral Neuropathy (diabetic peripheral neuropathy)    Endocrine:   Diabetes, poorly controlled, using insulin    Psych:   Psychiatric History Chronic ETOH abuse, last drink 8 months ago per patient report         Physical Exam  General:  Well nourished, Morbid Obesity    Airway/Jaw/Neck:  Airway Findings:  Mouth Opening: Normal Tongue: Normal  General Airway Assessment: Adult  Mallampati: II  Improves to II with phonation.  TM Distance: Normal, at least 6 cm  Jaw/Neck Findings:     Neck ROM: Normal ROM      Dental:  Dental Findings: (Multiple missing and decaying teeth)   Chest/Lungs:  Chest/Lungs Findings: Clear to auscultation, Normal Respiratory Rate     Heart/Vascular:  Heart Findings: Rate: Normal  Rhythm: Regular Rhythm  Sounds: Normal        Mental Status:  Mental Status Findings:  Cooperative, Alert and Oriented       Lab Results   Component Value Date    WBC 22.89 (H) 02/26/2020    HGB 12.0 (L) 02/26/2020    HCT 36.3 (L) 02/26/2020    MCV 89 02/26/2020     (L) 02/26/2020       Chemistry        Component Value Date/Time     (L) 02/26/2020 0214    K 3.7 02/26/2020 0214     02/26/2020 0214    CO2 20 (L) 02/26/2020 0214    BUN 28 (H) 02/26/2020 0214    CREATININE 2.0 (H) 02/26/2020 0214     (H) 02/26/2020 0214        Component Value Date/Time    CALCIUM 7.7 (L) 02/26/2020 0214    ALKPHOS 276 (H) 02/26/2020 0214    AST 38 02/26/2020 0214    ALT 39 02/26/2020 0214    BILITOT 1.2 (H) 02/26/2020 0214    ESTGFRAFRICA 39 (A) 02/26/2020 0214    EGFRNONAA 34 (A) 02/26/2020 0214      Conclusion     · Mildly decreased left ventricular systolic function. The estimated ejection fraction is 45-50%  · Left ventricular diastolic dysfunction.  · Normal right ventricular systolic function.  · Normal central venous pressure (3 mm Hg).  · Extremely technically challenging study, poor endocardial visualization, would recommend repeating with contrast if additional information is desired.   Electronically signed by Luisito Joiner MD on 6/27/19 at 1359 CDT     Echo 6/27/19  · Mildly decreased left ventricular systolic function. The estimated ejection fraction is 45-50%  · Left ventricular diastolic dysfunction.  · Normal right ventricular systolic function.  · Normal central venous pressure (3 mm  Hg).  · Extremely technically challenging study, poor endocardial visualization, would recommend repeating with contrast if additional information is desired.           Anesthesia Plan  Type of Anesthesia, risks & benefits discussed:  Anesthesia Type:  MAC, general  Patient's Preference:   Intra-op Monitoring Plan:   Intra-op Monitoring Plan Comments:   Post Op Pain Control Plan:   Post Op Pain Control Plan Comments:   Induction:   IV  Beta Blocker:  Patient is on a Beta-Blocker and has received one dose within the past 24 hours (No further documentation required).       Informed Consent: Patient understands risks and agrees with Anesthesia plan.  Questions answered. Anesthesia consent signed with patient.  ASA Score: 4     Day of Surgery Review of History & Physical: I have interviewed and examined the patient. I have reviewed the patient's H&P dated:  There are no significant changes.  H&P update referred to the provider.  H&P completed by Anesthesiologist.   Anesthesia Plan Notes: Pt with frequent PVCs on the ICU monitor while I was speaking to him and NIBP in the low 90s (syst). Spoke with Dr Yang about pt's present condition , and he said that the case was emergent and needed to be done now, despite risk factors.         Ready For Surgery From Anesthesia Perspective.

## 2020-02-26 NOTE — ASSESSMENT & PLAN NOTE
LVEF 45-50  Holding home BB given pressor use  No ACEI/ARB given pressor use and ELIEZER     Hold home Lasix- hypotension and fluids in setting of sepsis     Will follow volume status closely

## 2020-02-26 NOTE — ASSESSMENT & PLAN NOTE
Chronic kidney disease, stage 3  Avoid nephrotoxic agents  Renally dose medication  Continue IVF   Strict input/output

## 2020-02-26 NOTE — CONSULTS
[facility]  Cardiology  Consult Note    Patient Name: Jian Arrieta  MRN: [id number]  Admission Date: 2/23/2020  Hospital Length of Stay: 0 days  Code Status: Full Code   Attending Provider: Marialuisa Ybarra*   Consulting Provider: Blade Petty NP  Primary Care Physician: Leon Barajas DO  Principal Problem:Sepsis    Patient information was obtained from patient, past medical records and ER records.     Inpatient consult to [facility]  Consult performed by: Blade Petty NP  Consult ordered by: Marialuisa Ybarra MD        Subjective:     Chief Complaint:  Abdominal Pain      HPI:   Jian Arrieta is a 65 y/o M with h/o CAD, chronic pancreatitis, HTN, chronic Lymphedema, former smoker, morbid obesity, DM II, CABG in x3 years ago (2017), last stents were placed x2 years ago (2018). present with 2 days history of chest heaviness and pain located in the epigastric area, pain is about 2-3 radiating to the back. Pain is exacerbated with exertion like riding lawnmower. There is an associated right flank pain. Patient denies any chest pain and can climb a flight of stairs without limitation. Patient denies any CHURCH or other cardiac complaint. He reports that his anginal equivalent is back pain and his presenting symptoms are different.   MET was called yesterday due to hypotension now on pressor, with ELIEZER  Blood cx with GNR.   Plan for MRI with MRCP today.   Dr. Rowe  consulted  Cardiology consulted for surgical clearance   Troponin negative, EKG ST without acute ischemic changes    Past Medical History:   Diagnosis Date    Alcohol abuse     Anasarca 1/28/2019    Arthropathy associated with neurological disorder 9/2/2015    Atherosclerosis     Charcot foot due to diabetes mellitus     Chronic combined systolic and diastolic heart failure 01/29/2019 1-28-19 Left VentricleModerate decreased ejection fraction at 30%. Normal left ventricular cavity size.  Normal wall thickness observed. Grade I (mild) left ventricular diastolic dysfunction consistent with impaired relaxation. Normal left atrial pressure. Moderate, global hypokinesis(see wall scoring diagram). Right VentricleNormal cavity size, wall thickness and ejection fraction. Wall motion n    Chronic pancreatitis 1/28/2019    Colon polyps     approx 5 yrs ago    Coronary artery disease due to calcified coronary lesion 05/08/2015    5 stents on ASA      Diabetic polyneuropathy associated with type 2 diabetes mellitus 9/2/2015    Diverticulosis 1/28/2019    DM type 2 with diabetic peripheral neuropathy 2/4/2019    Encounter for blood transfusion     Essential hypertension 1/28/2019    Former smoker 8/26/2015    Healed ulcer of left foot on examination 6/20/2017    Hydrocele     approx 1.5 yrs ago    Hypoalbuminemia 2/4/2019    Lymphedema of both lower extremities 1/29/2019    Mixed hyperlipidemia 5/8/2015    Morbid obesity with BMI of 50.0-59.9, adult 5/8/2015    Onychomycosis of multiple toenails with type 2 diabetes mellitus and peripheral neuropathy 6/20/2017    Perianal cyst     approx 2 yrs ago    Pseudocyst of pancreas 1/28/2019 1-28-19 Liver has a cirrhotic morphology with no focal lesions.  Significant interval increase in ascites when compared to prior exam which may account for patient's abdominal distension.  Hypodense air-fluid collection along the body of the pancreas which is slightly smaller when compared to prior CT.  Findings may relate to pancreatic necrosis with pancreatic pseudocysts with infected pseudocyst    Skin cancer     skin cancer    Sleep apnea 8/26/2015    Status post bariatric surgery 1/11/2016    Type 2 diabetes mellitus, with long-term current use of insulin 5/8/2015       Past Surgical History:   Procedure Laterality Date    ANGIOGRAPHY N/A 6/28/2019    Procedure: ANGIOGRAM-PV STENT;  Surgeon: Ewa Diagnostic Provider;  Location: [facility] OR;  Service:  Radiology;  Laterality: N/A;    ANGIOPLASTY      total x5 stents    COLONOSCOPY N/A 10/6/2015    Procedure: COLONOSCOPY;  Surgeon: Shekhar Richards MD;  Location: [facility] ENDO (2ND FLR);  Service: Endoscopy;  Laterality: N/A;  BMI over 55/2nd floor case    5 day hold Plavix, Dr Kwadwo Arroyo    CORONARY ANGIOGRAPHY Right 3/20/2019    Procedure: ANGIOGRAM, CORONARY ARTERY;  Surgeon: Bob Duque MD;  Location: [facility] CATH LAB;  Service: Cardiology;  Laterality: Right;    CORONARY ARTERY BYPASS GRAFT  2017    x3    CORONARY BYPASS GRAFT ANGIOGRAPHY  3/20/2019    Procedure: Bypass graft study;  Surgeon: Bob Duque MD;  Location: [facility] CATH LAB;  Service: Cardiology;;    CYST REMOVAL      ESOPHAGOGASTRODUODENOSCOPY N/A 7/8/2019    Procedure: ESOPHAGOGASTRODUODENOSCOPY (EGD);  Surgeon: Huan Brumfield MD;  Location: [facility] ENDO;  Service: Endoscopy;  Laterality: N/A;    GASTRECTOMY      INSERTION OF DIALYSIS CATHETER N/A 5/17/2019    Procedure: pleurx;  Surgeon: Ewa Diagnostic Provider;  [facility];  Service: General;  Laterality: N/A;  [facility]    KNEE ARTHROSCOPY      perianal surgery      perianal cyst removed       Review of patient's allergies indicates:  No Known Allergies    No current facility-administered medications on file prior to encounter.      Current Outpatient Medications on File Prior to Encounter   Medication Sig    apixaban (ELIQUIS) 5 mg Tab Take 1 tablet (5 mg total) by mouth 2 (two) times daily.    cyanocobalamin, vitamin B-12, 500 mcg Subl Place 1 tablet under the tongue every Monday.     ferrous sulfate (FEOSOL ORAL) Take by mouth.    furosemide (LASIX) 20 MG tablet Take 1 tablet (20 mg total) by mouth 2 (two) times daily.    insulin aspart U-100 (NOVOLOG) 100 unit/mL injection Inject 10 Units into the skin 3 (three) times daily before meals.     insulin detemir U-100 (LEVEMIR FLEXTOUCH) 100 unit/mL (3 mL) SubQ InPn pen Inject 20 Units into the skin  "every evening.    blood sugar diagnostic Strp Test strips to use with meter covered by insurance to check blood sugars twice daily. Insulin dependent diabetic.    blood-glucose meter Misc Glucometer covered by insurance to check blood sugars at home.  Insulin dependent diabetic.    doxycycline (VIBRAMYCIN) 100 MG Cap TAKE 1 CAPSULE BY MOUTH TWICE A DAY UNTIL INFECTION IS CLEAR    glucagon, human recombinant, (GLUCAGON EMERGENCY KIT, HUMAN,) 1 mg SolR Inject 1 mg into the muscle as needed (For severely low sugar).    ketoconazole (NIZORAL) 2 % cream APPLY TO FACE AND CHEST TWICE A DAY    lancets Misc Lancets to use with meter covered by insurance to check blood sugars twice daily.  Insulin dependent diabetic.    lipase-protease-amylase (CREON) 36,000-114,000- 180,000 unit CpDR Take 1 capsule by mouth 3 (three) times daily.    metoprolol succinate (TOPROL-XL) 25 MG 24 hr tablet Take 1 tablet (25 mg total) by mouth once daily.    pen needle, diabetic (INSUPEN) 32 gauge x 1/4" Ndle 1 each by Misc.(Non-Drug; Combo Route) route 4 (four) times daily.    ramelteon (ROZEREM) 8 mg tablet Take 1 tablet (8 mg total) by mouth nightly as needed for Insomnia.    triamcinolone acetonide 0.1% (KENALOG) 0.1 % cream APPLY TO AFFECTED AREA 2-3 TIMES A DAY     Family History     Problem Relation (Age of Onset)    Cancer Mother, Father, Paternal Grandfather, Brother    Diabetes Maternal Grandmother    Heart disease Father    No Known Problems Paternal Grandmother    Obesity Sister    Parkinsonism Brother    Stroke Maternal Grandfather        Tobacco Use    Smoking status: Former Smoker     Packs/day: 2.00     Years: 30.00     Pack years: 60.00     Types: Cigarettes     Last attempt to quit: 2/1/2005     Years since quitting: 15.0    Smokeless tobacco: Never Used   Substance and Sexual Activity    Alcohol use: No     Comment: started ~2014, reports 1 shot daily, max 3 shots daily, vague about alcohol consumption. Last drink " 9/2018    Drug use: No    Sexual activity: Not on file     Review of Systems   Constitution: Positive for fever. Negative for diaphoresis.   HENT: Negative.    Eyes: Negative.    Cardiovascular: Positive for leg swelling (lymphedema ). Negative for chest pain, dyspnea on exertion, irregular heartbeat, near-syncope, orthopnea, palpitations, paroxysmal nocturnal dyspnea and syncope.   Respiratory: Negative.  Negative for cough and shortness of breath.    Endocrine: Negative.    Hematologic/Lymphatic: Negative.    Skin: Negative.    Musculoskeletal: Positive for arthritis.   Gastrointestinal: Positive for abdominal pain. Negative for nausea and vomiting.   Genitourinary: Negative.    Neurological: Negative.    Psychiatric/Behavioral: Negative.    Allergic/Immunologic: Negative.      Objective:     Vital Signs (Most Recent):  Temp: 98.3 °F (36.8 °C) (02/26/20 1130)  Pulse: 75 (02/26/20 1130)  Resp: (!) 32 (02/26/20 1130)  BP: (!) 74/48 (02/26/20 1130)  SpO2: 100 % (02/26/20 1130) Vital Signs (24h Range):  Temp:  [97.7 °F (36.5 °C)-102.9 °F (39.4 °C)] 98.3 °F (36.8 °C)  Pulse:  [] 75  Resp:  [10-32] 32  SpO2:  [93 %-100 %] 100 %  BP: ()/(48-93) 74/48     Weight: 112.9 kg (248 lb 14.4 oz)  Body mass index is 38.98 kg/m².    SpO2: 100 %  O2 Device (Oxygen Therapy): room air      Intake/Output Summary (Last 24 hours) at 2/26/2020 1416  Last data filed at 2/26/2020 1307  Gross per 24 hour   Intake 6749.32 ml   Output 455 ml   Net 6294.32 ml       Lines/Drains/Airways     Peripheral Intravenous Line                 Peripheral IV - Single Lumen 02/24/20 2358 20 G Right;Lateral Wrist 1 day         Peripheral IV - Single Lumen 02/26/20 0555 20 G Anterior;Left Antecubital less than 1 day                Physical Exam   Constitutional: He is oriented to person, place, and time. He appears well-developed and well-nourished. No distress.   HENT:   Head: Atraumatic.   Eyes: Right eye exhibits no discharge. Left eye  exhibits no discharge.   Neck: No JVD present.   Cardiovascular: Normal rate and regular rhythm. Exam reveals no gallop and no friction rub.   No murmur heard.  Pulmonary/Chest: Effort normal and breath sounds normal.   Abdominal: Soft. There is tenderness.   Musculoskeletal: He exhibits edema.   Neurological: He is alert and oriented to person, place, and time.   Skin: Skin is warm and dry. He is not diaphoretic.   Psychiatric: He has a normal mood and affect. His behavior is normal. Judgment and thought content normal.       Significant Labs:   Blood Culture:   Recent Labs   Lab 02/25/20  1558   LABBLOO Gram stain awilda bottle: Gram negative rods  Results called to and read back by: Kylah Bragg RN  02/26/2020    06:34   , BMP:   Recent Labs   Lab 02/25/20 0618 02/26/20 0214   * 228*   * 134*   K 4.7 3.7    103   CO2 19* 20*   BUN 23 28*   CREATININE 1.4 2.0*   CALCIUM 7.8* 7.7*   MG 1.7 1.5*   , CMP   Recent Labs   Lab 02/25/20 0618 02/26/20 0214   * 134*   K 4.7 3.7    103   CO2 19* 20*   * 228*   BUN 23 28*   CREATININE 1.4 2.0*   CALCIUM 7.8* 7.7*   PROT 5.7* 5.2*   ALBUMIN 2.5* 2.2*   BILITOT 0.6 1.2*   ALKPHOS 268* 276*   AST 26 38   ALT 41 39   ANIONGAP 10 11   ESTGFRAFRICA >60 39*   EGFRNONAA 52* 34*   , CBC   Recent Labs   Lab 02/25/20 0618 02/25/20  1915 02/26/20 0214   WBC 13.04* 10.18 22.89*   HGB 12.5* 11.5* 12.0*   HCT 38.1* 35.6* 36.3*   * 114* 147*   , INR   Recent Labs   Lab 02/25/20  1826   INR 1.0   , Lipid Panel No results for input(s): CHOL, HDL, LDLCALC, TRIG, CHOLHDL in the last 48 hours., Troponin No results for input(s): TROPONINI in the last 48 hours. and All pertinent lab results from the last 24 hours have been reviewed.    Significant Imaging: Echocardiogram:   Transthoracic echo (TTE) complete (Cupid Only):   Results for orders placed or performed during the hospital encounter of 06/25/19   Transthoracic echo (TTE) 2D with Color  Flow   Result Value Ref Range    Ascending aorta 3.76 cm    STJ 3.22 cm    AV mean gradient 2 mmHg    Ao peak mini 0.92 m/s    Ao VTI 15.92 cm    IVS 0.75 0.6 - 1.1 cm    LA size 3.94 cm    Left Atrium Major Axis 4.71 cm    Left Atrium Minor Axis 4.92 cm    LVIDD 4.76 3.5 - 6.0 cm    LVIDS 4.17 (A) 2.1 - 4.0 cm    LVOT diameter 1.98 cm    LVOT peak VTI 13.66 cm    PW 0.90 0.6 - 1.1 cm    RA Major Axis 3.27 cm    RA Width 2.59 cm    RVDD 3.13 cm    Sinus 3.47 cm    TAPSE 1.81 cm    TDI LATERAL 0.14 m/s    TDI SEPTAL 0.07 m/s    LA WIDTH 2.26 cm    LV Diastolic Volume 105.39 mL    LV Systolic Volume 77.15 mL    LVOT peak mini 0.58 m/s    FS 12 %    LA volume 36.43 cm3    LV mass 130.00 g    Left Ventricle Relative Wall Thickness 0.38 cm    AV valve area 2.64 cm2    AV Velocity Ratio 0.63     AV index (prosthetic) 0.86     Mean e' 0.1 m/s    LVOT area 3.1 cm2    LVOT stroke volume 42.04 cm3    AV peak gradient 3 mmHg    LV Systolic Volume Index 33.4 mL/m2    LV Diastolic Volume Index 45.66 mL/m2    LA Volume Index 15.8 mL/m2    LV Mass Index 56 g/m2    BSA 2.41 m2    Right Atrial Pressure (from IVC) 3 mmHg    Narrative    · Mildly decreased left ventricular systolic function. The estimated   ejection fraction is 45-50%  · Left ventricular diastolic dysfunction.  · Normal right ventricular systolic function.  · Normal central venous pressure (3 mm Hg).  · Extremely technically challenging study, poor endocardial visualization,   would recommend repeating with contrast if additional information is   desired.        Assessment and Plan:     Chronic diastolic heart failure  LVEF 45-50  Holding home BB given pressor use  No ACEI/ARB given pressor use and ELIEZER     Hold home Lasix- hypotension and fluids in setting of sepsis     Will follow volume status closely         Coronary artery disease due to calcified coronary lesion  CAD s/p PCI (x5) with subsequent CABG x3v (~2017 at [facility]; LIMA-LAD [patent], SVG-OM [patent], SVG-RCA  [occluded; L->R collaterals from LAD to PDA  EKG ST without acute ischemic changes  Troponin negative  LVEF 45-50%   Hold BB given pressor use   Resume asa post op  No statin given elevated LFTs    Pt has no active cardiac condition (ACS/USA, decompenstated CHF, significant arrhythmias or severe valvular disease) and can achieve > 4 METS.  Pt does not require further cardiac evaluation prior to undergoing surgical procedure. These recommendations follow the 2014 ACC/AHA Guideline on Perioperative Cardiovascular Evaluation and Management of Patients Undergoing Noncardiac Surgery. (JACC Vol. 64 No. 22, Dec 9, 2014, pp j98-f186).              VTE Risk Mitigation (From admission, onward)         Ordered     Place sequential compression device  Until discontinued      02/23/20 4586                Thank you for your consult. I will follow-up with patient. Please contact us if you have any additional questions.    Blade Petty, RADHA  Cardiology   [facility]

## 2020-02-26 NOTE — PLAN OF CARE
Plan of care reviewed with patient. Verbal reported of understanding. AAOX4. NSR/ST on monitor with no red alarms noted. Patient stable at this time. Lactic acid 4.8. Dr. Garcia notified. Safety maintained. Closely monitored body temp., HR, and blood pressure. No acute distress noted at this time. Bed in lowest position.Head of bed elevated. Bed alarm on. Side rails x 2 are up. Call bell within reach. Patient will be monitored overnight.

## 2020-02-26 NOTE — SIGNIFICANT EVENT
Patient initially with a 102.09 fever started on broad spectrum antibiotics and pan cultured with xray.   MET with a fever 102.9, tachycardia and shivering. Patient initially started on broad spectrum antibiotics.   Lactic acid 4.8. Inpatient sepsis bundle

## 2020-02-26 NOTE — ASSESSMENT & PLAN NOTE
Overnight became septic.  MRCP ordered Monday was never done.    Plan:  EUS + / - ERCP today  Keep NPO  Trend LFTs  Tailor Abx to blood cultures  Ordered for apixiban to be held (last dose PM of 2/25)  Gen surgery consult for cholecystitis

## 2020-02-27 PROBLEM — K76.0 FATTY LIVER DISEASE, NONALCOHOLIC: Chronic | Status: RESOLVED | Noted: 2019-05-09 | Resolved: 2020-02-27

## 2020-02-27 LAB
ALBUMIN SERPL BCP-MCNC: 2.1 G/DL (ref 3.5–5.2)
ALBUMIN SERPL BCP-MCNC: 2.1 G/DL (ref 3.5–5.2)
ALBUMIN/CREAT UR: 156.3 UG/MG (ref 0–30)
ALP SERPL-CCNC: 232 U/L (ref 55–135)
ALT SERPL W/O P-5'-P-CCNC: 29 U/L (ref 10–44)
ANION GAP SERPL CALC-SCNC: 11 MMOL/L (ref 8–16)
ANISOCYTOSIS BLD QL SMEAR: SLIGHT
AST SERPL-CCNC: 24 U/L (ref 10–40)
BASOPHILS # BLD AUTO: 0.06 K/UL (ref 0–0.2)
BASOPHILS NFR BLD: 0 % (ref 0–1.9)
BASOPHILS NFR BLD: 0.3 % (ref 0–1.9)
BILIRUB SERPL-MCNC: 0.6 MG/DL (ref 0.1–1)
BUN SERPL-MCNC: 44 MG/DL (ref 8–23)
BURR CELLS BLD QL SMEAR: ABNORMAL
CALCIUM SERPL-MCNC: 7.8 MG/DL (ref 8.7–10.5)
CHLORIDE SERPL-SCNC: 105 MMOL/L (ref 95–110)
CO2 SERPL-SCNC: 19 MMOL/L (ref 23–29)
CREAT SERPL-MCNC: 3 MG/DL (ref 0.5–1.4)
CREAT UR-MCNC: 72.3 MG/DL (ref 23–375)
CREAT UR-MCNC: 72.3 MG/DL (ref 23–375)
DIFFERENTIAL METHOD: ABNORMAL
DIFFERENTIAL METHOD: ABNORMAL
EOSINOPHIL # BLD AUTO: 0.2 K/UL (ref 0–0.5)
EOSINOPHIL NFR BLD: 0.7 % (ref 0–8)
EOSINOPHIL NFR BLD: 3 % (ref 0–8)
ERYTHROCYTE [DISTWIDTH] IN BLOOD BY AUTOMATED COUNT: 13.3 % (ref 11.5–14.5)
ERYTHROCYTE [DISTWIDTH] IN BLOOD BY AUTOMATED COUNT: 13.4 % (ref 11.5–14.5)
EST. GFR  (AFRICAN AMERICAN): 24 ML/MIN/1.73 M^2
EST. GFR  (NON AFRICAN AMERICAN): 21 ML/MIN/1.73 M^2
GGT SERPL-CCNC: 135 U/L (ref 8–55)
GLUCOSE SERPL-MCNC: 134 MG/DL (ref 70–110)
GRAM STN SPEC: NORMAL
HCT VFR BLD AUTO: 37.4 % (ref 40–54)
HCT VFR BLD AUTO: 37.4 % (ref 40–54)
HGB BLD-MCNC: 12.6 G/DL (ref 14–18)
HGB BLD-MCNC: 12.6 G/DL (ref 14–18)
IMM GRANULOCYTES # BLD AUTO: 0.31 K/UL (ref 0–0.04)
IMM GRANULOCYTES # BLD AUTO: ABNORMAL K/UL (ref 0–0.04)
IMM GRANULOCYTES NFR BLD AUTO: 1.4 % (ref 0–0.5)
IMM GRANULOCYTES NFR BLD AUTO: ABNORMAL % (ref 0–0.5)
LACTATE SERPL-SCNC: 1.1 MMOL/L (ref 0.5–2.2)
LYMPHOCYTES # BLD AUTO: 1.2 K/UL (ref 1–4.8)
LYMPHOCYTES NFR BLD: 5.3 % (ref 18–48)
LYMPHOCYTES NFR BLD: 7 % (ref 18–48)
MAGNESIUM SERPL-MCNC: 1.9 MG/DL (ref 1.6–2.6)
MCH RBC QN AUTO: 29.1 PG (ref 27–31)
MCH RBC QN AUTO: 29.2 PG (ref 27–31)
MCHC RBC AUTO-ENTMCNC: 33.7 G/DL (ref 32–36)
MCHC RBC AUTO-ENTMCNC: 33.7 G/DL (ref 32–36)
MCV RBC AUTO: 86 FL (ref 82–98)
MCV RBC AUTO: 87 FL (ref 82–98)
MICROALBUMIN UR DL<=1MG/L-MCNC: 113 UG/ML
MONOCYTES # BLD AUTO: 1.5 K/UL (ref 0.3–1)
MONOCYTES NFR BLD: 4 % (ref 4–15)
MONOCYTES NFR BLD: 6.5 % (ref 4–15)
NEUTROPHILS # BLD AUTO: 19.7 K/UL (ref 1.8–7.7)
NEUTROPHILS NFR BLD: 80 % (ref 38–73)
NEUTROPHILS NFR BLD: 85.8 % (ref 38–73)
NEUTS BAND NFR BLD MANUAL: 6 %
NRBC BLD-RTO: 0 /100 WBC
NRBC BLD-RTO: 0 /100 WBC
PHOSPHATE SERPL-MCNC: 2.7 MG/DL (ref 2.7–4.5)
PLATELET # BLD AUTO: 157 K/UL (ref 150–350)
PLATELET # BLD AUTO: 164 K/UL (ref 150–350)
PLATELET BLD QL SMEAR: ABNORMAL
PMV BLD AUTO: 11.4 FL (ref 9.2–12.9)
PMV BLD AUTO: 11.8 FL (ref 9.2–12.9)
POCT GLUCOSE: 135 MG/DL (ref 70–110)
POCT GLUCOSE: 139 MG/DL (ref 70–110)
POCT GLUCOSE: 167 MG/DL (ref 70–110)
POIKILOCYTOSIS BLD QL SMEAR: SLIGHT
POTASSIUM SERPL-SCNC: 3.7 MMOL/L (ref 3.5–5.1)
PROT SERPL-MCNC: 5.3 G/DL (ref 6–8.4)
PROT UR-MCNC: 53 MG/DL (ref 0–15)
PROT/CREAT UR: 0.73 MG/G{CREAT} (ref 0–0.2)
RBC # BLD AUTO: 4.31 M/UL (ref 4.6–6.2)
RBC # BLD AUTO: 4.33 M/UL (ref 4.6–6.2)
SODIUM SERPL-SCNC: 135 MMOL/L (ref 136–145)
WBC # BLD AUTO: 22.9 K/UL (ref 3.9–12.7)
WBC # BLD AUTO: 23.44 K/UL (ref 3.9–12.7)

## 2020-02-27 PROCEDURE — 63600175 PHARM REV CODE 636 W HCPCS: Performed by: FAMILY MEDICINE

## 2020-02-27 PROCEDURE — 87077 CULTURE AEROBIC IDENTIFY: CPT | Mod: 59

## 2020-02-27 PROCEDURE — 99900035 HC TECH TIME PER 15 MIN (STAT)

## 2020-02-27 PROCEDURE — 99231 PR SUBSEQUENT HOSPITAL CARE,LEVL I: ICD-10-PCS | Mod: GC,,, | Performed by: INTERNAL MEDICINE

## 2020-02-27 PROCEDURE — 87015 SPECIMEN INFECT AGNT CONCNTJ: CPT

## 2020-02-27 PROCEDURE — 87186 SC STD MICRODIL/AGAR DIL: CPT

## 2020-02-27 PROCEDURE — 87206 SMEAR FLUORESCENT/ACID STAI: CPT

## 2020-02-27 PROCEDURE — 76937 US GUIDE VASCULAR ACCESS: CPT | Performed by: ANESTHESIOLOGY

## 2020-02-27 PROCEDURE — 82570 ASSAY OF URINE CREATININE: CPT

## 2020-02-27 PROCEDURE — 25000003 PHARM REV CODE 250: Performed by: ANESTHESIOLOGY

## 2020-02-27 PROCEDURE — 25000003 PHARM REV CODE 250: Performed by: FAMILY MEDICINE

## 2020-02-27 PROCEDURE — 63600175 PHARM REV CODE 636 W HCPCS: Performed by: INTERNAL MEDICINE

## 2020-02-27 PROCEDURE — 87070 CULTURE OTHR SPECIMN AEROBIC: CPT

## 2020-02-27 PROCEDURE — 63600175 PHARM REV CODE 636 W HCPCS: Performed by: NURSE PRACTITIONER

## 2020-02-27 PROCEDURE — 87205 SMEAR GRAM STAIN: CPT

## 2020-02-27 PROCEDURE — 20000000 HC ICU ROOM

## 2020-02-27 PROCEDURE — 83735 ASSAY OF MAGNESIUM: CPT

## 2020-02-27 PROCEDURE — 85025 COMPLETE CBC W/AUTO DIFF WBC: CPT

## 2020-02-27 PROCEDURE — 85027 COMPLETE CBC AUTOMATED: CPT

## 2020-02-27 PROCEDURE — 51702 INSERT TEMP BLADDER CATH: CPT

## 2020-02-27 PROCEDURE — 25000003 PHARM REV CODE 250: Performed by: NURSE PRACTITIONER

## 2020-02-27 PROCEDURE — 87102 FUNGUS ISOLATION CULTURE: CPT

## 2020-02-27 PROCEDURE — 87076 CULTURE ANAEROBE IDENT EACH: CPT

## 2020-02-27 PROCEDURE — 63600175 PHARM REV CODE 636 W HCPCS: Performed by: RADIOLOGY

## 2020-02-27 PROCEDURE — 87116 MYCOBACTERIA CULTURE: CPT

## 2020-02-27 PROCEDURE — 84100 ASSAY OF PHOSPHORUS: CPT

## 2020-02-27 PROCEDURE — 87075 CULTR BACTERIA EXCEPT BLOOD: CPT

## 2020-02-27 PROCEDURE — 25000003 PHARM REV CODE 250: Performed by: RADIOLOGY

## 2020-02-27 PROCEDURE — 36620 INSERTION CATHETER ARTERY: CPT

## 2020-02-27 PROCEDURE — 83605 ASSAY OF LACTIC ACID: CPT

## 2020-02-27 PROCEDURE — 63600175 PHARM REV CODE 636 W HCPCS: Performed by: STUDENT IN AN ORGANIZED HEALTH CARE EDUCATION/TRAINING PROGRAM

## 2020-02-27 PROCEDURE — 80053 COMPREHEN METABOLIC PANEL: CPT

## 2020-02-27 PROCEDURE — 85007 BL SMEAR W/DIFF WBC COUNT: CPT

## 2020-02-27 PROCEDURE — 82977 ASSAY OF GGT: CPT

## 2020-02-27 PROCEDURE — 82043 UR ALBUMIN QUANTITATIVE: CPT

## 2020-02-27 PROCEDURE — 36556 INSERT NON-TUNNEL CV CATH: CPT

## 2020-02-27 PROCEDURE — 94761 N-INVAS EAR/PLS OXIMETRY MLT: CPT

## 2020-02-27 PROCEDURE — 99231 SBSQ HOSP IP/OBS SF/LOW 25: CPT | Mod: GC,,, | Performed by: INTERNAL MEDICINE

## 2020-02-27 RX ORDER — LIDOCAINE HYDROCHLORIDE 10 MG/ML
INJECTION, SOLUTION EPIDURAL; INFILTRATION; INTRACAUDAL; PERINEURAL
Status: COMPLETED | OUTPATIENT
Start: 2020-02-27 | End: 2020-02-27

## 2020-02-27 RX ORDER — SODIUM CHLORIDE, SODIUM LACTATE, POTASSIUM CHLORIDE, CALCIUM CHLORIDE 600; 310; 30; 20 MG/100ML; MG/100ML; MG/100ML; MG/100ML
INJECTION, SOLUTION INTRAVENOUS CONTINUOUS
Status: DISCONTINUED | OUTPATIENT
Start: 2020-02-27 | End: 2020-03-01

## 2020-02-27 RX ORDER — DIPHENHYDRAMINE HYDROCHLORIDE 50 MG/ML
12.5 INJECTION INTRAMUSCULAR; INTRAVENOUS EVERY 6 HOURS PRN
Status: DISCONTINUED | OUTPATIENT
Start: 2020-02-27 | End: 2020-03-03 | Stop reason: HOSPADM

## 2020-02-27 RX ORDER — LIDOCAINE HYDROCHLORIDE 10 MG/ML
INJECTION INFILTRATION; PERINEURAL CODE/TRAUMA/SEDATION MEDICATION
Status: COMPLETED | OUTPATIENT
Start: 2020-02-27 | End: 2020-02-27

## 2020-02-27 RX ORDER — LINEZOLID 2 MG/ML
600 INJECTION, SOLUTION INTRAVENOUS
Status: DISCONTINUED | OUTPATIENT
Start: 2020-02-27 | End: 2020-02-28

## 2020-02-27 RX ORDER — ONDANSETRON 2 MG/ML
INJECTION INTRAMUSCULAR; INTRAVENOUS CODE/TRAUMA/SEDATION MEDICATION
Status: COMPLETED | OUTPATIENT
Start: 2020-02-27 | End: 2020-02-27

## 2020-02-27 RX ORDER — FENTANYL CITRATE 50 UG/ML
INJECTION, SOLUTION INTRAMUSCULAR; INTRAVENOUS CODE/TRAUMA/SEDATION MEDICATION
Status: COMPLETED | OUTPATIENT
Start: 2020-02-27 | End: 2020-02-27

## 2020-02-27 RX ORDER — MIDAZOLAM HYDROCHLORIDE 1 MG/ML
INJECTION INTRAMUSCULAR; INTRAVENOUS CODE/TRAUMA/SEDATION MEDICATION
Status: COMPLETED | OUTPATIENT
Start: 2020-02-27 | End: 2020-02-27

## 2020-02-27 RX ADMIN — HYDROCODONE BITARTRATE AND ACETAMINOPHEN 1 TABLET: 5; 325 TABLET ORAL at 05:02

## 2020-02-27 RX ADMIN — LIDOCAINE HYDROCHLORIDE 10 ML: 10 INJECTION, SOLUTION INFILTRATION; PERINEURAL at 12:02

## 2020-02-27 RX ADMIN — LINEZOLID 600 MG: 600 INJECTION, SOLUTION INTRAVENOUS at 08:02

## 2020-02-27 RX ADMIN — SODIUM CHLORIDE 250 ML: 0.9 INJECTION, SOLUTION INTRAVENOUS at 03:02

## 2020-02-27 RX ADMIN — PANCRELIPASE 1 CAPSULE: 36000; 180000; 114000 CAPSULE, DELAYED RELEASE PELLETS ORAL at 09:02

## 2020-02-27 RX ADMIN — MIDAZOLAM HYDROCHLORIDE 1 MG: 1 INJECTION, SOLUTION INTRAMUSCULAR; INTRAVENOUS at 12:02

## 2020-02-27 RX ADMIN — PIPERACILLIN SODIUM AND TAZOBACTAM SODIUM 4.5 G: 4; .5 INJECTION, POWDER, LYOPHILIZED, FOR SOLUTION INTRAVENOUS at 10:02

## 2020-02-27 RX ADMIN — POTASSIUM CHLORIDE 10 MEQ: 10 INJECTION, SOLUTION INTRAVENOUS at 12:02

## 2020-02-27 RX ADMIN — PIPERACILLIN SODIUM AND TAZOBACTAM SODIUM 4.5 G: 4; .5 INJECTION, POWDER, LYOPHILIZED, FOR SOLUTION INTRAVENOUS at 05:02

## 2020-02-27 RX ADMIN — SODIUM CHLORIDE, SODIUM LACTATE, POTASSIUM CHLORIDE, AND CALCIUM CHLORIDE: .6; .31; .03; .02 INJECTION, SOLUTION INTRAVENOUS at 09:02

## 2020-02-27 RX ADMIN — SODIUM CHLORIDE, SODIUM LACTATE, POTASSIUM CHLORIDE, AND CALCIUM CHLORIDE: .6; .31; .03; .02 INJECTION, SOLUTION INTRAVENOUS at 11:02

## 2020-02-27 RX ADMIN — LIDOCAINE HYDROCHLORIDE 20 MG: 10 INJECTION, SOLUTION EPIDURAL; INFILTRATION; INTRACAUDAL; PERINEURAL at 12:02

## 2020-02-27 RX ADMIN — PIPERACILLIN SODIUM AND TAZOBACTAM SODIUM 4.5 G: 4; .5 INJECTION, POWDER, LYOPHILIZED, FOR SOLUTION INTRAVENOUS at 01:02

## 2020-02-27 RX ADMIN — MAGNESIUM SULFATE 1 G: 1 INJECTION INTRAVENOUS at 12:02

## 2020-02-27 RX ADMIN — ONDANSETRON 8 MG: 8 TABLET, ORALLY DISINTEGRATING ORAL at 01:02

## 2020-02-27 RX ADMIN — INSULIN DETEMIR 15 UNITS: 100 INJECTION, SOLUTION SUBCUTANEOUS at 10:02

## 2020-02-27 RX ADMIN — Medication 0.06 MCG/KG/MIN: at 06:02

## 2020-02-27 RX ADMIN — DIPHENHYDRAMINE HYDROCHLORIDE 12.5 MG: 50 INJECTION, SOLUTION INTRAMUSCULAR; INTRAVENOUS at 01:02

## 2020-02-27 RX ADMIN — FENTANYL CITRATE 50 MCG: 50 INJECTION, SOLUTION INTRAMUSCULAR; INTRAVENOUS at 12:02

## 2020-02-27 RX ADMIN — ONDANSETRON 4 MG: 2 INJECTION, SOLUTION INTRAMUSCULAR; INTRAVENOUS at 12:02

## 2020-02-27 NOTE — PROGRESS NOTES
"Neuroendocrine Surgery Progress Note    S:  Patient hypotensive overnight following EUS yesterday  Given bolus IVF and started on Levophed at 0.26mcg/kg/min  Levo weaned to 0.06mcg/kg/min now  Patient complaining of epigastric abdominal pain and back pain, states that this is similar to his angina  Afebrile  Eliquis held (day 2)    O:  Temp:  [96.7 °F (35.9 °C)-99 °F (37.2 °C)] 96.7 °F (35.9 °C)  Pulse:  [] 71  Resp:  [7-34] 16  SpO2:  [69 %-100 %] 99 %  BP: ()/(43-75) 116/63  Arterial Line BP: (112-215)/(44-75) 141/64    Output:  U 293cc (0.1cc/kg/hr)  BMx0    Physical Exam:  Gen: NAD, AAOx3  HEENT: EOMI, NCAT  CV: RR  Resp: no shortness of breath, normal WOB  Abd: obese, soft, ttp in the RUQ, nd, +Chakraborty sign, no rebound, no guarding  Ext: no cyanosis or edema  MSK: normal range of motion, normal strength  Neuro: alert and oriented    Labs:  WBC 22->23  H/H 12/37  BUN 44  Cr 2.5->3  Ca 7.8  TB 1.2  BNP 1607  Lactate 4.7->2.3->1.1    EUS:  No pathology of the CBD, +gallbladder sludge and thickened gallbladder wall concerning for cholecystitis    A/P:  63 y/o M w/ hx of PMH morbid obesity (BMI 41), stage 1 hepatic fibrosis (biopsy negative for cirrhosis), chronic pancreatitis and extrahepatic portal vein stenosis with  large volume ascites requiring twice weekly paracenteses in addition to pancreatic pseudocyst as well as CAD s/p PCI X 5 and CABG X 3 now with cholecystitis, ELIEZER, CHF requiring pressor support    -  pt not ideal surgical candidate w/ cardiac issues and currently only two days off of Eliquis  - will discuss possible cholecystostomy tube with Dr. Rowe  - continue antibiotic therapy  - ELIEZER management per renal  - Cardiology did not recommend any preoperative evaluation as the patient has "no active cardiac condition and can achieve > 4 METS"  - hold Eliquis  - continue care per primary team      Laurita Mclain MD  [facility], PGY-2    "

## 2020-02-27 NOTE — PLAN OF CARE
The pt's currently in ICU. The Sw met with the pt and placed her contact info and name on the white board in the pt's room. The Sw gave the pt a d/c planning brochure. The Sw addressed all the pt's questions. The Sw encouraged the pt to call should he have any further questions or concerns. The Sw will continue to follow the pt throughout his transitions of care and will assist with any d/c needs.        02/27/20 1368   Discharge Reassessment   Assessment Type Discharge Planning Reassessment   Provided patient/caregiver education on the expected discharge date and the discharge plan Yes   Do you have any problems affording any of your prescribed medications? No   Discharge Plan A Home with family   Discharge Plan B Home Health   DME Needed Upon Discharge    (TBD)   Patient choice form signed by patient/caregiver N/A   Anticipated Discharge Disposition Home   Can the patient/caregiver answer the patient profile reliably? Yes, cognitively intact   How does the patient rate their overall health at the present time? Fair   Describe the patient's ability to walk at the present time. Walks with the help of equipment   How often would a person be available to care for the patient? Often   Number of comorbid conditions (as recorded on the chart) Two   During the past month, has the patient often been bothered by feeling down, depressed or hopeless? No   During the past month, has the patient often been bothered by little interest or pleasure in doing things? No

## 2020-02-27 NOTE — HOSPITAL COURSE
2/27 pt seen, GI had recommended an MRCP, but the pt already had a Cholecystostomy tube placement this mormning, will hold off on the MRCP for now. Ok to provide 1/2 NS  2/28 pt seen, feeling better today,  at site of drain. sug adjusted his abx. Will place on midodrine low dose to prevent the swings and needs for pressors  2/29  Pt is feeling better, more interacting, better urine output, not complaining of pain, can be transferred to tele floor today  3/1 pt seen, feeling much better than prior days. Will continue current abx, and LR and add supp given his low albumin  3/2 pt seen, improving, PT/OT recommended HH, renal is following, till cr elevated, will continue current management  3/3 pt seen discussed case qwith Dr maynard , no need for more antibiotics we can continue flagyl for few days and then stop. We can dc the pt home with F/U with surg, nephrology and HH.  Cleared by nephrology for discharge as well

## 2020-02-27 NOTE — PROCEDURES
Interventional Radiology Post-Procedure Note    Pre Op Diagnosis: Cholecystitis  Post Op Diagnosis: Same    Procedure: Cholecystostomy tube placement    Procedure performed by: Carlos    Written Informed Consent Obtained: Yes  Specimen Sent: Yes  Estimated Blood Loss: Minimal    Findings:   Distended GB with thickened wall, pericholecystic fluid and internal echogenic debris. 8-Fr APD placed via transhepatic approach. 120 mL foul-smelling, purulent bilious material removed through drain.    No immediate complications. Patient tolerated procedure well. Please see full dictated procedure report for additional details and recommendations.      Carlos Gonzalez MD  [facility]  Pager [phone]

## 2020-02-27 NOTE — PROGRESS NOTES
"Dr. Valadez from cards called about PVCs  And Map below 65 with restarting pressers. MD stated, "this is septic shock vs cardiogenic shock and to consult eICU."    "

## 2020-02-27 NOTE — PROGRESS NOTES
"NP informed of UP of 10 cc past 2 hours. With frequent PVCs no CP. NP informed to consult cards and neph.     Dr. Askew called and stated, "As long as Pt does not have SOB or distress then no need for intervention at present. Possibly may need HD in am."     2100 Map went into 50s placed back on Levo. Pt denies CP and SOB at present.   "

## 2020-02-27 NOTE — PROGRESS NOTES
"[facility]    CC: abdominal pain     HPI 64 y.o. male with h/o ascites (but no cirrhosis) and portal vein stenosis s/p balloon dilation presented to ED with acute, severe, substernal and epigastric pain radiating to the right with associated extrahepatic biliary ductal dilation and nausea/vomiting. On eliquis, CAD stents.    Interval History:  Had EUS with no bile duct pathology but findings concerning for cholecystitis. Has been hypotensive with chest pain since procedure requiring pressors. Surgery was consulted and he is pending IR procedure. Having some abdominal pain.     Past Medical History   chronic pain, DM, CAD s/p CABG, ascites, portal vein stenosis s/p balloon dilation, bariatric surgery, eliquis therapy    Review of Systems  General ROS: negative for - chills, fever or weight loss  Cardiovascular ROS: Positive for chest pain or dyspnea on exertion  Gastrointestinal ROS: Positive for abdominal pain, no change in bowel habits, or black/ bloody stools    Physical Examination  BP (!) 99/57   Pulse 68   Temp 96.7 °F (35.9 °C) (Axillary)   Resp 15   Ht 5' 7" (1.702 m)   Wt 115.5 kg (254 lb 10.1 oz)   SpO2 100%   BMI 39.88 kg/m²   General appearance: alert, cooperative, no distress, obese  HENT: Normocephalic, atraumatic, neck symmetrical, no nasal discharge   Lungs: clear to auscultation in all fields, symmetric chest wall expansion bilaterally, no wheeze, rale, or rhonchi  Heart: normal rate, regular rhythm without rub; palpable peripheral pulses  Abdomen: soft, non-tender; bowel sounds normoactive; no organomegaly  Extremities: extremities symmetric; no clubbing, cyanosis, or edema  Neurologic: Alert and oriented X 3, normal strength, normal coordination    Labs:  WBC 23  Hg 12.6  Plt 157    TB 0.6      Assessment:   64 year old man with obesity, MARIE, chronic pancreatitis with pseudocyst, PV stenosis, history of ascites, CAD s/p stenting and CABG on eliquis, HF, ELIEZER, and now with " cholecystitis. EUS with no need for bile decompression on 2/26/2020. Eliquis held for 2 days now. Surgery following for management but hemodynamically stable requiring pressor support complicating the picture.    Plan:  - further management per surgery team and IR  - continue antibiotics and supportive care      Melonie Varela MD MPH  [facility] PGY 5  [phone] cell  [phone] pager

## 2020-02-27 NOTE — PROGRESS NOTES
Reviewed Dr. Davies's note about consulting Dr. Rowe for cholecystostomy but not a prime candidate right now d/t cardiac issues. Placed wife's number on chart per request.

## 2020-02-27 NOTE — PROGRESS NOTES
Progress Note  Nephrology      Consult Requested By: Tien Arboleda DO      SUBJECTIVE:     Overnight events  Patient is a 64 y.o. male     Patient Active Problem List   Diagnosis    Coronary artery disease due to calcified coronary lesion    Type 2 diabetes mellitus, with long-term current use of insulin    Sleep apnea    Status post bariatric surgery    Anasarca    Atherosclerosis    Chronic pancreatitis    Pseudocyst of pancreas    Chronic diastolic heart failure    Lymphedema of both lower extremities    Type 2 diabetes mellitus with diabetic neuropathy, with long-term current use of insulin    Hypoalbuminemia    Myocardiopathy    Other ascites    ELIEZER (acute kidney injury)    Anemia    NSTEMI (non-ST elevated myocardial infarction)    Paroxysmal atrial fibrillation    Hypertension associated with diabetes    Hyperlipidemia associated with type 2 diabetes mellitus    Obesity hypoventilation syndrome    Fatty liver disease, nonalcoholic    Symptomatic anemia    Alteration in skin integrity    Venous stasis dermatitis of both lower extremities    Acute renal failure with specified lesion    S/P abdominal paracentesis    Decreased mobility    Discharge planning issues    Malnutrition of moderate degree    Hepatic fibrosis    Portal hypertension due to obstruction of extrahepatic portal vein    Venous stasis ulcer of left lower leg with edema of left lower leg    Chronic acquired lymphedema    Gastritis    Chronic kidney disease, stage 3    Obesity, Class III, BMI 40-49.9 (morbid obesity)    Decreased strength    Impaired functional mobility, balance, gait, and endurance    Medically noncompliant    Type 2 diabetes mellitus with stage 3 chronic kidney disease, without long-term current use of insulin    Non compliance with medical treatment    Chest pain    Hyperglycemia    Right lower quadrant abdominal pain    Dilation of biliary tract    Bacteremia    Sepsis     Past  Medical History:   Diagnosis Date    Alcohol abuse     Anasarca 1/28/2019    Arthropathy associated with neurological disorder 9/2/2015    Atherosclerosis     Charcot foot due to diabetes mellitus     Chronic combined systolic and diastolic heart failure 01/29/2019 1-28-19 Left VentricleModerate decreased ejection fraction at 30%. Normal left ventricular cavity size. Normal wall thickness observed. Grade I (mild) left ventricular diastolic dysfunction consistent with impaired relaxation. Normal left atrial pressure. Moderate, global hypokinesis(see wall scoring diagram). Right VentricleNormal cavity size, wall thickness and ejection fraction. Wall motion n    Chronic pancreatitis 1/28/2019    Colon polyps     approx 5 yrs ago    Coronary artery disease due to calcified coronary lesion 05/08/2015    5 stents on ASA      Diabetic polyneuropathy associated with type 2 diabetes mellitus 9/2/2015    Diverticulosis 1/28/2019    DM type 2 with diabetic peripheral neuropathy 2/4/2019    Encounter for blood transfusion     Essential hypertension 1/28/2019    Former smoker 8/26/2015    Healed ulcer of left foot on examination 6/20/2017    Hydrocele     approx 1.5 yrs ago    Hypoalbuminemia 2/4/2019    Lymphedema of both lower extremities 1/29/2019    Mixed hyperlipidemia 5/8/2015    Morbid obesity with BMI of 50.0-59.9, adult 5/8/2015    Onychomycosis of multiple toenails with type 2 diabetes mellitus and peripheral neuropathy 6/20/2017    Perianal cyst     approx 2 yrs ago    Pseudocyst of pancreas 1/28/2019 1-28-19 Liver has a cirrhotic morphology with no focal lesions.  Significant interval increase in ascites when compared to prior exam which may account for patient's abdominal distension.  Hypodense air-fluid collection along the body of the pancreas which is slightly smaller when compared to prior CT.  Findings may relate to pancreatic necrosis with pancreatic pseudocysts with infected  pseudocyst    Skin cancer     skin cancer    Sleep apnea 8/26/2015    Status post bariatric surgery 1/11/2016    Type 2 diabetes mellitus, with long-term current use of insulin 5/8/2015              OBJECTIVE:     Vitals:    02/27/20 0815 02/27/20 0830 02/27/20 0845 02/27/20 0900   BP:    (!) 99/57   BP Location:       Patient Position:       Pulse: 77 73 71 68   Resp: 14 15 14 15   Temp:       TempSrc:       SpO2: 97% 100% 100% 100%   Weight:       Height:           Temp: 96.7 °F (35.9 °C) (02/27/20 0330)  Pulse: 68 (02/27/20 0900)  Resp: 15 (02/27/20 0900)  BP: (!) 99/57 (02/27/20 0900)  SpO2: 100 % (02/27/20 0900)    Date 02/27/20 0700 - 02/28/20 0659   Shift 7585-6919 4173-7848 2221-6069 24 Hour Total   INTAKE   Shift Total(mL/kg)       OUTPUT   Urine(mL/kg/hr) 86   86   Shift Total(mL/kg) 86(0.7)   86(0.7)   Weight (kg) 115.5 115.5 115.5 115.5             Medications:   insulin detemir U-100  15 Units Subcutaneous Daily    lipase-protease-amylase  1 capsule Oral TID    piperacillin-tazobactam (ZOSYN) IVPB  4.5 g Intravenous Q8H      norepinephrine bitartrate-D5W 0.06 mcg/kg/min (02/27/20 0633)               Physical Exam:  General appearance:NAD  Epigastric abdominal pain  Lungs: diminished breath sounds  Heart: Pulse 68  Abdomen: soft  Extremities: edema  Skin: dry    Laboratory:  ABG  Labs reviewed  Recent Results (from the past 336 hour(s))   Basic metabolic panel    Collection Time: 02/26/20  3:56 PM   Result Value Ref Range    Sodium 135 (L) 136 - 145 mmol/L    Potassium 3.9 3.5 - 5.1 mmol/L    Chloride 103 95 - 110 mmol/L    CO2 22 (L) 23 - 29 mmol/L    BUN, Bld 36 (H) 8 - 23 mg/dL    Creatinine 2.5 (H) 0.5 - 1.4 mg/dL    Calcium 7.8 (L) 8.7 - 10.5 mg/dL    Anion Gap 10 8 - 16 mmol/L     Recent Results (from the past 336 hour(s))   CBC with Automated Differential    Collection Time: 02/27/20  3:35 AM   Result Value Ref Range    WBC 23.44 (H) 3.90 - 12.70 K/uL    Hemoglobin 12.6 (L) 14.0 - 18.0  g/dL    Hematocrit 37.4 (L) 40.0 - 54.0 %    Platelets 157 150 - 350 K/uL   CBC auto differential    Collection Time: 02/27/20 12:59 AM   Result Value Ref Range    WBC 22.90 (H) 3.90 - 12.70 K/uL    Hemoglobin 12.6 (L) 14.0 - 18.0 g/dL    Hematocrit 37.4 (L) 40.0 - 54.0 %    Platelets 164 150 - 350 K/uL   CBC with Automated Differential    Collection Time: 02/26/20  2:14 AM   Result Value Ref Range    WBC 22.89 (H) 3.90 - 12.70 K/uL    Hemoglobin 12.0 (L) 14.0 - 18.0 g/dL    Hematocrit 36.3 (L) 40.0 - 54.0 %    Platelets 147 (L) 150 - 350 K/uL     Urinalysis  No results for input(s): COLORU, CLARITYU, SPECGRAV, PHUR, PROTEINUA, GLUCOSEU, BILIRUBINCON, BLOODU, WBCU, RBCU, BACTERIA, MUCUS, NITRITE, LEUKOCYTESUR, UROBILINOGEN, HYALINECASTS in the last 24 hours.    Diagnostic Results:  X-Ray: Reviewed  US: Reviewed  Echo: Reviewed  ACCESS    ASSESSMENT/PLAN:   Cholecystitis  ELIEZER/CKD 4  Diabetes Mellitus  Diabetic Retinopathy  CAD   cc/24 h  UA protein 2+, blood trace  US  Right kidney: 11.2 cm. No hydronephrosis.  Left kidney: 11.4 cm. No hydronephrosis  No hydronephrosis.  Normal bilateral resistive indices.     Hyponatremia  Metabolic acidosis  Metabolic bone disease  Hypophosphatemia  Hypomagnesemia  Replace electrolytes prn  Corrected calcium approximately 9.6  Poor nutrition  Albumin 2.1  Hb 12.6  Hypotension  Blood pressure 99/57  On pressor  Echo 2019  · Mildly decreased left ventricular systolic function. The estimated ejection fraction is 45-50%  · Left ventricular diastolic dysfunction.  · Normal right ventricular systolic function.  · Normal central venous pressure (3 mm Hg).  ·     Avoid nephrotoxic agents, hypotension, hypovolemia  Weight daily  I and O      ·     LR 1 liter - 10 hours  Will follow up today

## 2020-02-27 NOTE — ANESTHESIA PROCEDURE NOTES
Central Line    Diagnosis: hypotension  Patient location during procedure: ICU  Procedure start time: 2/26/2020 11:30 PM  Timeout: 2/26/2020 11:30 PM  Procedure end time: 2/26/2020 11:40 PM    Staffing  Authorizing Provider: Chris Barajas MD  Performing Provider: Chris Barajas MD    Staffing  Anesthesiologist: Chris Barajas MD  Resident/CRNA: Soham Lewis MD  Performed: resident/CRNA   Anesthesiologist was present at the time of the procedure.  Preanesthetic Checklist  Completed: patient identified, site marked, surgical consent, pre-op evaluation, timeout performed, IV checked, risks and benefits discussed, monitors and equipment checked and anesthesia consent given  Indication   Indication: hemodynamic monitoring     Anesthesia   local infiltration    Central Line   Skin Prep: skin prepped with ChloraPrep, skin prep agent completely dried prior to procedure  maximum sterile barriers used during central venous catheter insertion  hand hygiene performed prior to central venous catheter insertion  Location: right, internal jugular.   Catheter type: triple lumen  Catheter Size: 7 Fr  Inserted Catheter Length: 14 cm  Ultrasound: vascular probe with ultrasound  Vessel Caliber: medium, patent, compressibility normal  Needle advanced into vessel with real time Ultrasound guidance.  Guidewire confirmed in vessel.  Image recorded and saved.   Manometry: Venous cannualation confirmed by visual estimation of blood vessel pressure using manometry.  Insertion Attempts: 1   Securement:line sutured, chlorhexidine patch, sterile dressing applied and blood return through all ports    Post-Procedure   X-Ray: no pneumothorax on x-ray, placement verified by x-ray, successful placement and tip termination  Tip termination comments: above Left    Adverse Events:none    Guidewire Guidewire removed intact. Guidewire removed intact, verified with nurse.

## 2020-02-27 NOTE — PROGRESS NOTES
[facility] Hospital Medicine  Progress Note    Patient Name: Jian Arrieta  MRN: [id number]  Patient Class: IP- Inpatient   Admission Date: 2/23/2020  Length of Stay: 1 days  Attending Physician: Tien Arboleda DO  Primary Care Provider: Leon Barajas DO        Subjective:     Principal Problem:Sepsis        HPI:  Jian Arrieta is a 63 y/o M with h/o CAD, chronic pancreatitis, HTN, chronic Lymphedema, former smoker, morbid obesity, DM II, CABG in x3 years ago (2017), last stents were placed x2 years ago (2018). present with 2 days history of chest heaviness and pain located in the epigastric area, pain is about 2-3 radiating to the back. Pain is exacerbated with exertion like riding lawnmower. There is an associated right flank pain. He denies chills, fever, nausea, or vomiting.     Overview/Hospital Course:  2/27 pt seen, GI had recommended an MRCP, but the pt already had a Cholecystostomy tube placement this mormning, will hold off on the MRCP for now. Ok to provide 1/2 NS    Interval History: pt seen, GI had recommended an MRCP, but the pt already had a Cholecystostomy tube placement this mormning, will hold off on the MRCP for now. Ok to provide 1/2 NS    Review of Systems   Constitutional: Positive for activity change. Negative for chills and fever.   Respiratory: Negative for shortness of breath.    Cardiovascular: Negative for chest pain.   Gastrointestinal: Positive for abdominal distention.   Neurological: Negative for light-headedness and headaches.   Psychiatric/Behavioral: Negative for behavioral problems.     Objective:     Vital Signs (Most Recent):  Temp: 96.7 °F (35.9 °C) (02/27/20 0330)  Pulse: 69 (02/27/20 1415)  Resp: 15 (02/27/20 1415)  BP: 122/62 (02/27/20 1240)  SpO2: 97 % (02/27/20 1415) Vital Signs (24h Range):  Temp:  [96.7 °F (35.9 °C)-99 °F (37.2 °C)] 96.7 °F (35.9 °C)  Pulse:  [] 69  Resp:  [9-32] 15  SpO2:  [69 %-100 %] 97 %  BP: ()/(43-75)  122/62  Arterial Line BP: (109-215)/(44-75) 113/52     Weight: 115.5 kg (254 lb 10.1 oz)  Body mass index is 39.88 kg/m².    Intake/Output Summary (Last 24 hours) at 2/27/2020 1426  Last data filed at 2/27/2020 1330  Gross per 24 hour   Intake 1510.13 ml   Output 576 ml   Net 934.13 ml      Physical Exam   Constitutional: He appears well-developed and well-nourished.   HENT:   Head: Normocephalic and atraumatic.   Neck: Normal range of motion. Neck supple.   Cardiovascular: Normal rate, regular rhythm and normal heart sounds. Exam reveals no gallop and no friction rub.   No murmur heard.  Pulmonary/Chest: Effort normal and breath sounds normal. No respiratory distress.   Abdominal: Soft. Bowel sounds are normal. There is no tenderness.   Musculoskeletal: Normal range of motion. He exhibits no edema or tenderness.   + edema   Neurological: He is alert.   Skin: Skin is warm.   Psychiatric: His speech is normal and behavior is normal. His mood appears not anxious. Cognition and memory are normal. He does not exhibit a depressed mood.       Significant Labs:   Bilirubin:   Recent Labs   Lab 02/23/20  1238 02/24/20  0542 02/25/20  0618 02/26/20  0214 02/27/20  0335   BILITOT 0.8 0.8 0.6 1.2* 0.6     Recent Labs   Lab 02/25/20  1915 02/26/20  0214 02/27/20  0059   LACTATE 4.7* 2.7* 1.1     Lipase:   No results for input(s): LIPASE in the last 48 hours.  Magnesium:   Recent Labs   Lab 02/26/20  0214 02/26/20  1556 02/27/20  0335   MG 1.5* 1.7 1.9     Recent Labs   Lab 02/26/20  0214 02/27/20  0059 02/27/20  0335   WBC 22.89* 22.90* 23.44*   HGB 12.0* 12.6* 12.6*   HCT 36.3* 37.4* 37.4*   * 164 157     Recent Labs   Lab 02/25/20  0618 02/26/20  0214 02/26/20  1556 02/27/20  0335   * 134* 135* 135*   K 4.7 3.7 3.9 3.7    103 103 105   CO2 19* 20* 22* 19*   BUN 23 28* 36* 44*   CREATININE 1.4 2.0* 2.5* 3.0*   * 228* 205* 134*   CALCIUM 7.8* 7.7* 7.8* 7.8*   MG 1.7 1.5* 1.7 1.9   PHOS 2.3* 2.3* 2.7  2.7   LIPASE 4  --   --   --      Recent Labs   Lab 02/23/20  1238  02/25/20  0618 02/25/20  1826 02/26/20  0214 02/27/20  0335   ALKPHOS 394*   < > 268*  --  276* 232*   *   < > 41  --  39 29   AST 90*   < > 26  --  38 24   ALBUMIN 3.2*   < > 2.5*  --  2.2* 2.1*  2.1*   PROT 6.6   < > 5.7*  --  5.2* 5.3*   BILITOT 0.8   < > 0.6  --  1.2* 0.6   INR 1.0  --   --  1.0  --   --     < > = values in this interval not displayed.      No results for input(s): CPK, CPKMB, MB, TROPONINI in the last 72 hours.  Recent Labs   Lab 02/26/20  0902 02/26/20  1158 02/26/20  1711 02/26/20  2134 02/27/20  0741 02/27/20  1301   POCTGLUCOSE 237* 244* 226* 132* 135* 139*     Hemoglobin A1C   Date Value Ref Range Status   02/23/2020 12.4 (H) 4.0 - 5.6 % Final     Comment:     ADA Screening Guidelines:  5.7-6.4%  Consistent with prediabetes  >or=6.5%  Consistent with diabetes  High levels of fetal hemoglobin interfere with the HbA1C  assay. Heterozygous hemoglobin variants (HbS, HgC, etc)do  not significantly interfere with this assay.   However, presence of multiple variants may affect accuracy.     02/21/2020 12.5 (H) 4.0 - 5.6 % Final     Comment:     ADA Screening Guidelines:  5.7-6.4%  Consistent with prediabetes  >or=6.5%  Consistent with diabetes  High levels of fetal hemoglobin interfere with the HbA1C  assay. Heterozygous hemoglobin variants (HbS, HgC, etc)do  not significantly interfere with this assay.   However, presence of multiple variants may affect accuracy.     11/15/2019 9.5 (H) 4.0 - 5.6 % Final     Comment:     ADA Screening Guidelines:  5.7-6.4%  Consistent with prediabetes  >or=6.5%  Consistent with diabetes  High levels of fetal hemoglobin interfere with the HbA1C  assay. Heterozygous hemoglobin variants (HbS, HgC, etc)do  not significantly interfere with this assay.   However, presence of multiple variants may affect accuracy.       Scheduled Meds:   insulin detemir U-100  15 Units Subcutaneous Daily     lipase-protease-amylase  1 capsule Oral TID    piperacillin-tazobactam (ZOSYN) IVPB  4.5 g Intravenous Q8H     Continuous Infusions:   lactated ringers 75 mL/hr at 02/27/20 1114    norepinephrine bitartrate-D5W Stopped (02/27/20 1330)     As Needed: acetaminophen, calcium carbonate, Dextrose 10% Bolus, Dextrose 10% Bolus, Dextrose 10% Bolus, Dextrose 10% Bolus, diphenhydrAMINE, glucagon (human recombinant), glucose, glucose, HYDROcodone-acetaminophen, ibuprofen, insulin aspart U-100, insulin aspart U-100, melatonin, ondansetron, ondansetron, pneumoc 13-sugey conj-dip cr(PF), sodium chloride 0.9%       Significant Imaging:  X-Ray Chest 1 View  Narrative: EXAMINATION:  XR CHEST 1 VIEW    CLINICAL HISTORY:  Central Line Placement Verification;    TECHNIQUE:  Single frontal view of the chest was performed.    COMPARISON:  02/25/2020.    FINDINGS:  Right IJ central venous catheter is visualized with distal tip over the SVC.  No significant change in cardiopulmonary status.  No pneumothorax.  Impression: As above.    Electronically signed by: Micaela Ruiz MD  Date:    02/27/2020  Time:    00:40          Assessment/Plan:      * Sepsis  Bacteremia  Blood cx with GNR, on Vanc and Zosyn- de-escalate to Zosyn  Repeat blood cx on 2/27        Cholecystitis  2/27  GI had recommended an MRCP, but the pt already had a Cholecystostomy tube placement this mormning,   Continue symptomatic management  Off pressors    Chronic pancreatitis  Fatty liver disease, nonalcoholic  Continue Creon  Monitor   S/P choly drain placement  Lipase on 2/25 is 4      Bacteremia    Continue zosyn iv    Dilation of biliary tract    2/27 t the pt already had a Cholecystostomy tube placement this mormning    Right lower quadrant abdominal pain  RUQ USS  PPI      Chest pain  Coronary artery disease due to calcified coronary lesion  Trend troponin    Chronic acquired lymphedema  chronic      ELIEZER (acute kidney injury)  C2/27 hronic kidney disease, stage  3  Renally dose medication  Continue IVF   Cr up to 3 from 2 and 2.5, gentle hydration    Type 2 diabetes mellitus with diabetic neuropathy, with long-term current use of insulin  On   levemir  Low dose SSI  accucheck  Diabetic diet      Chronic diastolic heart failure    I/2 NS, and monitorStable.      Coronary artery disease due to calcified coronary lesion  Continue Eliquis        VTE Risk Mitigation (From admission, onward)         Ordered     Place sequential compression device  Until discontinued      02/23/20 5042                Critical care time spent on the evaluation and treatment of severe organ dysfunction, review of pertinent labs and imaging studies, discussions with consulting providers and discussions with patient/family: 34 minutes.      Tien Arboleda DO  Department of Hospital Medicine   [facility]

## 2020-02-27 NOTE — PROGRESS NOTES
Low BP  ELIEZER  June 2019 EF 45%   Now SBP 79  MAP to 60 with Trendelenburg  Concern for causing fluid overload   As BP stayed low will need central line and arterial line and titrate up  On levophed   PVC- treat low K, Mg and recheck  Check CBC, lactic acid to assess perfusion , Hb   D.w RN  On video pt resting

## 2020-02-27 NOTE — SUBJECTIVE & OBJECTIVE
Interval History: pt seen, GI had recommended an MRCP, but the pt already had a Cholecystostomy tube placement this mormning, will hold off on the MRCP for now. Ok to provide 1/2 NS    Review of Systems   Constitutional: Positive for activity change. Negative for chills and fever.   Respiratory: Negative for shortness of breath.    Cardiovascular: Negative for chest pain.   Gastrointestinal: Positive for abdominal distention.   Neurological: Negative for light-headedness and headaches.   Psychiatric/Behavioral: Negative for behavioral problems.     Objective:     Vital Signs (Most Recent):  Temp: 96.7 °F (35.9 °C) (02/27/20 0330)  Pulse: 69 (02/27/20 1415)  Resp: 15 (02/27/20 1415)  BP: 122/62 (02/27/20 1240)  SpO2: 97 % (02/27/20 1415) Vital Signs (24h Range):  Temp:  [96.7 °F (35.9 °C)-99 °F (37.2 °C)] 96.7 °F (35.9 °C)  Pulse:  [] 69  Resp:  [9-32] 15  SpO2:  [69 %-100 %] 97 %  BP: ()/(43-75) 122/62  Arterial Line BP: (109-215)/(44-75) 113/52     Weight: 115.5 kg (254 lb 10.1 oz)  Body mass index is 39.88 kg/m².    Intake/Output Summary (Last 24 hours) at 2/27/2020 1426  Last data filed at 2/27/2020 1330  Gross per 24 hour   Intake 1510.13 ml   Output 576 ml   Net 934.13 ml      Physical Exam   Constitutional: He appears well-developed and well-nourished.   HENT:   Head: Normocephalic and atraumatic.   Neck: Normal range of motion. Neck supple.   Cardiovascular: Normal rate, regular rhythm and normal heart sounds. Exam reveals no gallop and no friction rub.   No murmur heard.  Pulmonary/Chest: Effort normal and breath sounds normal. No respiratory distress.   Abdominal: Soft. Bowel sounds are normal. There is no tenderness.   Musculoskeletal: Normal range of motion. He exhibits no edema or tenderness.   + edema   Neurological: He is alert.   Skin: Skin is warm.   Psychiatric: His speech is normal and behavior is normal. His mood appears not anxious. Cognition and memory are normal. He does not exhibit  a depressed mood.       Significant Labs:   Bilirubin:   Recent Labs   Lab 02/23/20  1238 02/24/20  0542 02/25/20  0618 02/26/20  0214 02/27/20  0335   BILITOT 0.8 0.8 0.6 1.2* 0.6     Recent Labs   Lab 02/25/20  1915 02/26/20  0214 02/27/20  0059   LACTATE 4.7* 2.7* 1.1     Lipase:   No results for input(s): LIPASE in the last 48 hours.  Magnesium:   Recent Labs   Lab 02/26/20  0214 02/26/20  1556 02/27/20  0335   MG 1.5* 1.7 1.9     Recent Labs   Lab 02/26/20  0214 02/27/20  0059 02/27/20  0335   WBC 22.89* 22.90* 23.44*   HGB 12.0* 12.6* 12.6*   HCT 36.3* 37.4* 37.4*   * 164 157     Recent Labs   Lab 02/25/20  0618 02/26/20  0214 02/26/20  1556 02/27/20  0335   * 134* 135* 135*   K 4.7 3.7 3.9 3.7    103 103 105   CO2 19* 20* 22* 19*   BUN 23 28* 36* 44*   CREATININE 1.4 2.0* 2.5* 3.0*   * 228* 205* 134*   CALCIUM 7.8* 7.7* 7.8* 7.8*   MG 1.7 1.5* 1.7 1.9   PHOS 2.3* 2.3* 2.7 2.7   LIPASE 4  --   --   --      Recent Labs   Lab 02/23/20  1238  02/25/20  0618 02/25/20  1826 02/26/20  0214 02/27/20  0335   ALKPHOS 394*   < > 268*  --  276* 232*   *   < > 41  --  39 29   AST 90*   < > 26  --  38 24   ALBUMIN 3.2*   < > 2.5*  --  2.2* 2.1*  2.1*   PROT 6.6   < > 5.7*  --  5.2* 5.3*   BILITOT 0.8   < > 0.6  --  1.2* 0.6   INR 1.0  --   --  1.0  --   --     < > = values in this interval not displayed.      No results for input(s): CPK, CPKMB, MB, TROPONINI in the last 72 hours.  Recent Labs   Lab 02/26/20  0902 02/26/20  1158 02/26/20  1711 02/26/20  2134 02/27/20  0741 02/27/20  1301   POCTGLUCOSE 237* 244* 226* 132* 135* 139*     Hemoglobin A1C   Date Value Ref Range Status   02/23/2020 12.4 (H) 4.0 - 5.6 % Final     Comment:     ADA Screening Guidelines:  5.7-6.4%  Consistent with prediabetes  >or=6.5%  Consistent with diabetes  High levels of fetal hemoglobin interfere with the HbA1C  assay. Heterozygous hemoglobin variants (HbS, HgC, etc)do  not significantly interfere with this  assay.   However, presence of multiple variants may affect accuracy.     02/21/2020 12.5 (H) 4.0 - 5.6 % Final     Comment:     ADA Screening Guidelines:  5.7-6.4%  Consistent with prediabetes  >or=6.5%  Consistent with diabetes  High levels of fetal hemoglobin interfere with the HbA1C  assay. Heterozygous hemoglobin variants (HbS, HgC, etc)do  not significantly interfere with this assay.   However, presence of multiple variants may affect accuracy.     11/15/2019 9.5 (H) 4.0 - 5.6 % Final     Comment:     ADA Screening Guidelines:  5.7-6.4%  Consistent with prediabetes  >or=6.5%  Consistent with diabetes  High levels of fetal hemoglobin interfere with the HbA1C  assay. Heterozygous hemoglobin variants (HbS, HgC, etc)do  not significantly interfere with this assay.   However, presence of multiple variants may affect accuracy.       Scheduled Meds:   insulin detemir U-100  15 Units Subcutaneous Daily    lipase-protease-amylase  1 capsule Oral TID    piperacillin-tazobactam (ZOSYN) IVPB  4.5 g Intravenous Q8H     Continuous Infusions:   lactated ringers 75 mL/hr at 02/27/20 1114    norepinephrine bitartrate-D5W Stopped (02/27/20 1330)     As Needed: acetaminophen, calcium carbonate, Dextrose 10% Bolus, Dextrose 10% Bolus, Dextrose 10% Bolus, Dextrose 10% Bolus, diphenhydrAMINE, glucagon (human recombinant), glucose, glucose, HYDROcodone-acetaminophen, ibuprofen, insulin aspart U-100, insulin aspart U-100, melatonin, ondansetron, ondansetron, pneumoc 13-sugey conj-dip cr(PF), sodium chloride 0.9%       Significant Imaging:  X-Ray Chest 1 View  Narrative: EXAMINATION:  XR CHEST 1 VIEW    CLINICAL HISTORY:  Central Line Placement Verification;    TECHNIQUE:  Single frontal view of the chest was performed.    COMPARISON:  02/25/2020.    FINDINGS:  Right IJ central venous catheter is visualized with distal tip over the SVC.  No significant change in cardiopulmonary status.  No pneumothorax.  Impression: As  above.    Electronically signed by: Micaela Ruiz MD  Date:    02/27/2020  Time:    00:40

## 2020-02-27 NOTE — CONSULTS
Interventional Radiology Consult/Pre-Procedure Note      Chief Complaint/Reason for Consult: Cholecystitis    History of Present Illness:  Jian Arrieta is a 64 y.o. male who presents in septic shock with signs, symptoms and imaging findings of cholecystitis. D/w Dr. Rowe. IR consulted for cholecystostomy tube placement.    Of note, pt underwent portal venoplasty in 6/2019 with resolution of ascites requiring frequent paracentesis.    Admission H&P reviewed.    Past Medical History:   Diagnosis Date    Alcohol abuse     Anasarca 1/28/2019    Arthropathy associated with neurological disorder 9/2/2015    Atherosclerosis     Charcot foot due to diabetes mellitus     Chronic combined systolic and diastolic heart failure 01/29/2019 1-28-19 Left VentricleModerate decreased ejection fraction at 30%. Normal left ventricular cavity size. Normal wall thickness observed. Grade I (mild) left ventricular diastolic dysfunction consistent with impaired relaxation. Normal left atrial pressure. Moderate, global hypokinesis(see wall scoring diagram). Right VentricleNormal cavity size, wall thickness and ejection fraction. Wall motion n    Chronic pancreatitis 1/28/2019    Colon polyps     approx 5 yrs ago    Coronary artery disease due to calcified coronary lesion 05/08/2015    5 stents on ASA      Diabetic polyneuropathy associated with type 2 diabetes mellitus 9/2/2015    Diverticulosis 1/28/2019    DM type 2 with diabetic peripheral neuropathy 2/4/2019    Encounter for blood transfusion     Essential hypertension 1/28/2019    Former smoker 8/26/2015    Healed ulcer of left foot on examination 6/20/2017    Hydrocele     approx 1.5 yrs ago    Hypoalbuminemia 2/4/2019    Lymphedema of both lower extremities 1/29/2019    Mixed hyperlipidemia 5/8/2015    Morbid obesity with BMI of 50.0-59.9, adult 5/8/2015    Onychomycosis of multiple toenails with type 2 diabetes mellitus and peripheral neuropathy  6/20/2017    Perianal cyst     approx 2 yrs ago    Pseudocyst of pancreas 1/28/2019 1-28-19 Liver has a cirrhotic morphology with no focal lesions.  Significant interval increase in ascites when compared to prior exam which may account for patient's abdominal distension.  Hypodense air-fluid collection along the body of the pancreas which is slightly smaller when compared to prior CT.  Findings may relate to pancreatic necrosis with pancreatic pseudocysts with infected pseudocyst    Skin cancer     skin cancer    Sleep apnea 8/26/2015    Status post bariatric surgery 1/11/2016    Type 2 diabetes mellitus, with long-term current use of insulin 5/8/2015     Past Surgical History:   Procedure Laterality Date    ANGIOGRAPHY N/A 6/28/2019    Procedure: ANGIOGRAM-PV STENT;  Surgeon: Ewa Diagnostic Provider;  Location: [facility] OR;  Service: Radiology;  Laterality: N/A;    ANGIOPLASTY      total x5 stents    COLONOSCOPY N/A 10/6/2015    Procedure: COLONOSCOPY;  Surgeon: Shekhar Richards MD;  Location: [facility] ([address] [place] [address]);  Service: Endoscopy;  Laterality: N/A;  BMI over 55/2nd floor case    5 day hold Plavix, Dr Kwadwo Arroyo    CORONARY ANGIOGRAPHY Right 3/20/2019    Procedure: ANGIOGRAM, CORONARY ARTERY;  Surgeon: Bob Duque MD;  Location: [facility] CATH LAB;  Service: Cardiology;  Laterality: Right;    CORONARY ARTERY BYPASS GRAFT  2017    x3    CORONARY BYPASS GRAFT ANGIOGRAPHY  3/20/2019    Procedure: Bypass graft study;  Surgeon: Bob Duque MD;  Location: [facility] CATH LAB;  Service: Cardiology;;    CYST REMOVAL      ENDOSCOPIC ULTRASOUND OF UPPER GASTROINTESTINAL TRACT N/A 2/26/2020    Procedure: ULTRASOUND, UPPER GI TRACT, ENDOSCOPIC;  Surgeon: Robbie Yang MD;  Location: [facility] ENDO;  Service: Endoscopy;  Laterality: N/A;    ESOPHAGOGASTRODUODENOSCOPY N/A 7/8/2019    Procedure: ESOPHAGOGASTRODUODENOSCOPY (EGD);  Surgeon: Huan Brumfield MD;  Location: [facility] ENDO;  Service: Endoscopy;   Laterality: N/A;    GASTRECTOMY      INSERTION OF DIALYSIS CATHETER N/A 5/17/2019    Procedure: pleurx;  Surgeon: Ewa Diagnostic Provider;  Location: [facility] OR [address] [place] [address];  Service: General;  Laterality: N/A;  Room 188/[place]    KNEE ARTHROSCOPY      perianal surgery      perianal cyst removed       Allergies:   Review of patient's allergies indicates:  No Known Allergies    Scheduled Meds:    insulin detemir U-100  15 Units Subcutaneous Daily    lipase-protease-amylase  1 capsule Oral TID    piperacillin-tazobactam (ZOSYN) IVPB  4.5 g Intravenous Q8H     Continuous Infusions:    lactated ringers 75 mL/hr at 02/27/20 1114    norepinephrine bitartrate-D5W 0.06 mcg/kg/min (02/27/20 0633)     PRN Meds:acetaminophen, calcium carbonate, Dextrose 10% Bolus, Dextrose 10% Bolus, Dextrose 10% Bolus, Dextrose 10% Bolus, diphenhydrAMINE, glucagon (human recombinant), glucose, glucose, HYDROcodone-acetaminophen, ibuprofen, insulin aspart U-100, insulin aspart U-100, melatonin, ondansetron, ondansetron, pneumoc 13-sugey conj-dip cr(PF), sodium chloride 0.9%    Anticoagulation/Antiplatelet Meds: None    Review of Systems:   As documented in admission H&P.    Physical Exam:  Temp: 96.7 °F (35.9 °C) (02/27/20 0330)  Pulse: 77 (02/27/20 1240)  Resp: 16 (02/27/20 1240)  BP: 122/62 (02/27/20 1240)  SpO2: 100 % (02/27/20 1240)     General: NAD  HEENT: Normocephalic, sclera anicteric, oropharynx clear  Heart: RRR  Lungs: Symmetric excursions, breathing unlabored  Abd: Moderately TTP in the RUQ  Extremities: VALLE  Neuro: Gross nonfocal    Labs:  Recent Labs   Lab 02/25/20  1826   INR 1.0       Recent Labs   Lab 02/27/20  0335   WBC 23.44*   HGB 12.6*   HCT 37.4*   MCV 87         Recent Labs   Lab 02/27/20  0335   *   *   K 3.7      CO2 19*   BUN 44*   CREATININE 3.0*   CALCIUM 7.8*   MG 1.9   ALT 29   AST 24   ALBUMIN 2.1*  2.1*   BILITOT 0.6     Imaging:  US abd 2/24/2020 reviewed.    Assessment/Plan:    Probable acute cholecystitis. Will undergo cholecystostomy tube placement today.    Sedation:  Sedation history: have not been any systemic reactions  ASA: 3 / Mallampati: 3  Sedation plan: Moderate (Versed, fentanyl)     Risks (including, but not limited to, pain, bleeding, infection, damage to nearby structures, failure, and the need for additional procedures), benefits, and alternatives were discussed with the patient. All questions were answered to the best of my abilities. The patient wishes to proceed. Written informed consent was obtained.      Carlos Gonzalez MD  [facility] IR  Pager [phone]

## 2020-02-27 NOTE — ANESTHESIA POSTPROCEDURE EVALUATION
Anesthesia Post Evaluation    Patient: Jian Arrieta    Procedure(s) Performed: Procedure(s) (LRB):  ULTRASOUND, UPPER GI TRACT, ENDOSCOPIC (N/A)    Final Anesthesia Type: general    Patient location during evaluation: ICU  Patient participation: Yes- Able to Participate  Level of consciousness: awake and alert, oriented and awake  Post-procedure vital signs: reviewed and stable  Pain management: adequate  Airway patency: patent    PONV status at discharge: No PONV  Anesthetic complications: no      Cardiovascular status: blood pressure returned to baseline  Respiratory status: unassisted and room air  Hydration status: euvolemic  Follow-up not needed.          Vitals Value Taken Time   /61 2/27/2020 10:02 AM   Temp 35.9 °C (96.7 °F) 2/27/2020  3:30 AM   Pulse 68 2/27/2020 10:57 AM   Resp 13 2/27/2020 10:57 AM   SpO2 100 % 2/27/2020 10:57 AM   Vitals shown include unvalidated device data.      No case tracking events are documented in the log.      Pain/Jason Score: Pain Rating Post Med Admin: 0 (2/26/2020 12:49 AM)

## 2020-02-27 NOTE — PROGRESS NOTES
Better overall but still needing pressor with low BP  Will order NS bolus  ; CVP not high   LA better  Resting on video    D/w RN

## 2020-02-27 NOTE — NURSING
Pt arrived back on unit at 1255. Pt connected to monitors. AAOx4. Requesting to rest. Willl continue to monitor.

## 2020-02-27 NOTE — PROGRESS NOTES
eICU consulted on increased PVCs and need to go up on Levo, Decreased UO, BNP results. Awaiting orders.

## 2020-02-27 NOTE — ASSESSMENT & PLAN NOTE
C2/27 hronic kidney disease, stage 3  Renally dose medication  Continue IVF   Cr up to 3 from 2 and 2.5, gentle hydration

## 2020-02-27 NOTE — PLAN OF CARE
Pt AAOx4. NAD. Denies pain. Breathing even and unlabored with O2 sat 100% on RA. MAP dropped to 50s with Levo restarted needed to be given at higher dose of 0.26 mcg/kg/min with Pt IV site not tolerating well. MAP remained unstable, orders for Central line and Arterial line placed. IV Mag and K+ ordered for PVCs of 17. Pt remained asymptomatic. Pt IV benadryl effective. NP ok'd hourly BP peripherally to decrease agitation to site while on arterial line. Pt able to be weaned down to 0.08 on levo. Urine output increased from 5 to 20 cc per hour. IV NS 250cc bolus IV given per eICU. Safety maintained. SR up x3. Call light in reach. Family not present at bedside.

## 2020-02-27 NOTE — ASSESSMENT & PLAN NOTE
2/27  GI had recommended an MRCP, but the pt already had a Cholecystostomy tube placement this mormning,   Continue symptomatic management  Off pressors

## 2020-02-27 NOTE — ANESTHESIA PROCEDURE NOTES
Arterial    Diagnosis: sepsis    Patient location during procedure: ICU  Procedure start time: 2/27/2020 12:00 AM and 2/26/2020 11:50 PM  Timeout: 2/26/2020 11:50 PM  Procedure end time: 2/26/2020 11:52 PM    Staffing  Authorizing Provider: Chris Barajas MD  Performing Provider: Chris Barajas MD    Anesthesiologist was present at the time of the procedure.    Preanesthetic Checklist  Completed: patient identified, site marked, surgical consent, pre-op evaluation, timeout performed, IV checked, risks and benefits discussed, monitors and equipment checked and anesthesia consent givenArterial  Skin Prep: chlorhexidine gluconate  Local Infiltration: lidocaine  Orientation: left  Location: radial  Catheter Size: 20 G  Catheter placement by Ultrasound guidance. Heme positive aspiration all ports.  Vessel Caliber: large, patent, compressibility normal  Needle advanced into vessel with real time Ultrasound guidance.  Guidewire confirmed in vessel.  Sterile sheath used.  Image recorded and saved.Insertion Attempts: 1  Assessment  Dressing: secured with tape and tegaderm  Patient: Tolerated well

## 2020-02-27 NOTE — NURSING
Pt sister requesting to speak to Dr. Yang. Wants information on the EUS performed today. Sister information sent with Nurse to procedure. Pt sister did not receive call. Dr. Yang notified. Stated will contact sister to give update.

## 2020-02-27 NOTE — EICU
Kristina Communicated with Bedside Nurse regarding:  Time-Out    Nurse Notified:  Yes    Doctor Notified:  Yes    Comments: called to room for Time Out. Xray ordered, and LDA for Central Line placed. Patient tolerated procedure well.

## 2020-02-28 ENCOUNTER — PATIENT OUTREACH (OUTPATIENT)
Dept: ADMINISTRATIVE | Facility: OTHER | Age: 65
End: 2020-02-28

## 2020-02-28 PROBLEM — R73.9 HYPERGLYCEMIA: Status: RESOLVED | Noted: 2020-02-23 | Resolved: 2020-02-28

## 2020-02-28 PROBLEM — R10.31 RIGHT LOWER QUADRANT ABDOMINAL PAIN: Status: RESOLVED | Noted: 2020-02-23 | Resolved: 2020-02-28

## 2020-02-28 LAB
ALBUMIN SERPL BCP-MCNC: 1.8 G/DL (ref 3.5–5.2)
ALP SERPL-CCNC: 197 U/L (ref 55–135)
ALT SERPL W/O P-5'-P-CCNC: 20 U/L (ref 10–44)
ANION GAP SERPL CALC-SCNC: 9 MMOL/L (ref 8–16)
ANISOCYTOSIS BLD QL SMEAR: SLIGHT
AST SERPL-CCNC: 14 U/L (ref 10–40)
BASOPHILS NFR BLD: 3 % (ref 0–1.9)
BILIRUB SERPL-MCNC: 0.4 MG/DL (ref 0.1–1)
BUN SERPL-MCNC: 55 MG/DL (ref 8–23)
BURR CELLS BLD QL SMEAR: ABNORMAL
CALCIUM SERPL-MCNC: 7.4 MG/DL (ref 8.7–10.5)
CALCIUM SERPL-MCNC: 7.4 MG/DL (ref 8.7–10.5)
CALCIUM SERPL-MCNC: 7.8 MG/DL (ref 8.7–10.5)
CHLORIDE SERPL-SCNC: 104 MMOL/L (ref 95–110)
CHLORIDE SERPL-SCNC: 105 MMOL/L (ref 95–110)
CHLORIDE SERPL-SCNC: 105 MMOL/L (ref 95–110)
CO2 SERPL-SCNC: 20 MMOL/L (ref 23–29)
CREAT SERPL-MCNC: 3.4 MG/DL (ref 0.5–1.4)
DACRYOCYTES BLD QL SMEAR: ABNORMAL
DIFFERENTIAL METHOD: ABNORMAL
EOSINOPHIL NFR BLD: 0 % (ref 0–8)
ERYTHROCYTE [DISTWIDTH] IN BLOOD BY AUTOMATED COUNT: 13.9 % (ref 11.5–14.5)
EST. GFR  (AFRICAN AMERICAN): 21 ML/MIN/1.73 M^2
EST. GFR  (NON AFRICAN AMERICAN): 18 ML/MIN/1.73 M^2
GLUCOSE SERPL-MCNC: 216 MG/DL (ref 70–110)
GLUCOSE SERPL-MCNC: 216 MG/DL (ref 70–110)
GLUCOSE SERPL-MCNC: 295 MG/DL (ref 70–110)
HCT VFR BLD AUTO: 32.9 % (ref 40–54)
HGB BLD-MCNC: 11.1 G/DL (ref 14–18)
IMM GRANULOCYTES # BLD AUTO: ABNORMAL K/UL (ref 0–0.04)
IMM GRANULOCYTES NFR BLD AUTO: ABNORMAL % (ref 0–0.5)
LYMPHOCYTES NFR BLD: 7 % (ref 18–48)
MAGNESIUM SERPL-MCNC: 2.1 MG/DL (ref 1.6–2.6)
MAGNESIUM SERPL-MCNC: 2.1 MG/DL (ref 1.6–2.6)
MCH RBC QN AUTO: 29.4 PG (ref 27–31)
MCHC RBC AUTO-ENTMCNC: 33.7 G/DL (ref 32–36)
MCV RBC AUTO: 87 FL (ref 82–98)
MONOCYTES NFR BLD: 5 % (ref 4–15)
NEUTROPHILS NFR BLD: 81 % (ref 38–73)
NEUTS BAND NFR BLD MANUAL: 4 %
NRBC BLD-RTO: 0 /100 WBC
PHOSPHATE SERPL-MCNC: 3.9 MG/DL (ref 2.7–4.5)
PLATELET # BLD AUTO: 125 K/UL (ref 150–350)
PLATELET BLD QL SMEAR: ABNORMAL
PMV BLD AUTO: 11.6 FL (ref 9.2–12.9)
POCT GLUCOSE: 203 MG/DL (ref 70–110)
POCT GLUCOSE: 227 MG/DL (ref 70–110)
POCT GLUCOSE: 248 MG/DL (ref 70–110)
POCT GLUCOSE: 280 MG/DL (ref 70–110)
POCT GLUCOSE: 303 MG/DL (ref 70–110)
POIKILOCYTOSIS BLD QL SMEAR: SLIGHT
POLYCHROMASIA BLD QL SMEAR: ABNORMAL
POTASSIUM SERPL-SCNC: 3.8 MMOL/L (ref 3.5–5.1)
POTASSIUM SERPL-SCNC: 3.8 MMOL/L (ref 3.5–5.1)
POTASSIUM SERPL-SCNC: 3.9 MMOL/L (ref 3.5–5.1)
PROT SERPL-MCNC: 4.7 G/DL (ref 6–8.4)
RBC # BLD AUTO: 3.77 M/UL (ref 4.6–6.2)
SODIUM SERPL-SCNC: 133 MMOL/L (ref 136–145)
SODIUM SERPL-SCNC: 134 MMOL/L (ref 136–145)
SODIUM SERPL-SCNC: 134 MMOL/L (ref 136–145)
WBC # BLD AUTO: 12.54 K/UL (ref 3.9–12.7)

## 2020-02-28 PROCEDURE — 25000003 PHARM REV CODE 250: Performed by: HOSPITALIST

## 2020-02-28 PROCEDURE — 25000003 PHARM REV CODE 250: Performed by: NURSE PRACTITIONER

## 2020-02-28 PROCEDURE — 83735 ASSAY OF MAGNESIUM: CPT | Mod: 91

## 2020-02-28 PROCEDURE — 63600175 PHARM REV CODE 636 W HCPCS: Performed by: FAMILY MEDICINE

## 2020-02-28 PROCEDURE — 97530 THERAPEUTIC ACTIVITIES: CPT

## 2020-02-28 PROCEDURE — 63600175 PHARM REV CODE 636 W HCPCS: Performed by: STUDENT IN AN ORGANIZED HEALTH CARE EDUCATION/TRAINING PROGRAM

## 2020-02-28 PROCEDURE — 63600175 PHARM REV CODE 636 W HCPCS: Performed by: INTERNAL MEDICINE

## 2020-02-28 PROCEDURE — 25000003 PHARM REV CODE 250: Performed by: FAMILY MEDICINE

## 2020-02-28 PROCEDURE — 20000000 HC ICU ROOM

## 2020-02-28 PROCEDURE — 80053 COMPREHEN METABOLIC PANEL: CPT

## 2020-02-28 PROCEDURE — 84100 ASSAY OF PHOSPHORUS: CPT

## 2020-02-28 PROCEDURE — 83735 ASSAY OF MAGNESIUM: CPT

## 2020-02-28 PROCEDURE — 80048 BASIC METABOLIC PNL TOTAL CA: CPT

## 2020-02-28 PROCEDURE — 97802 MEDICAL NUTRITION INDIV IN: CPT

## 2020-02-28 PROCEDURE — 94761 N-INVAS EAR/PLS OXIMETRY MLT: CPT

## 2020-02-28 PROCEDURE — S0030 INJECTION, METRONIDAZOLE: HCPCS | Performed by: STUDENT IN AN ORGANIZED HEALTH CARE EDUCATION/TRAINING PROGRAM

## 2020-02-28 PROCEDURE — 97162 PT EVAL MOD COMPLEX 30 MIN: CPT

## 2020-02-28 PROCEDURE — 25000003 PHARM REV CODE 250: Performed by: STUDENT IN AN ORGANIZED HEALTH CARE EDUCATION/TRAINING PROGRAM

## 2020-02-28 PROCEDURE — 85027 COMPLETE CBC AUTOMATED: CPT

## 2020-02-28 PROCEDURE — 97165 OT EVAL LOW COMPLEX 30 MIN: CPT

## 2020-02-28 PROCEDURE — 85007 BL SMEAR W/DIFF WBC COUNT: CPT

## 2020-02-28 RX ORDER — MIDODRINE HYDROCHLORIDE 2.5 MG/1
2.5 TABLET ORAL 2 TIMES DAILY WITH MEALS
Status: DISCONTINUED | OUTPATIENT
Start: 2020-02-28 | End: 2020-03-02

## 2020-02-28 RX ORDER — CEFEPIME HYDROCHLORIDE 2 G/50ML
2 INJECTION, SOLUTION INTRAVENOUS
Status: DISCONTINUED | OUTPATIENT
Start: 2020-02-28 | End: 2020-02-28

## 2020-02-28 RX ORDER — METRONIDAZOLE 500 MG/100ML
500 INJECTION, SOLUTION INTRAVENOUS
Status: DISCONTINUED | OUTPATIENT
Start: 2020-02-28 | End: 2020-03-02

## 2020-02-28 RX ORDER — HYDROCODONE BITARTRATE AND ACETAMINOPHEN 5; 325 MG/1; MG/1
1 TABLET ORAL EVERY 6 HOURS PRN
Status: DISCONTINUED | OUTPATIENT
Start: 2020-02-28 | End: 2020-03-03 | Stop reason: HOSPADM

## 2020-02-28 RX ADMIN — METRONIDAZOLE 500 MG: 500 INJECTION, SOLUTION INTRAVENOUS at 12:02

## 2020-02-28 RX ADMIN — CALCIUM CARBONATE (ANTACID) CHEW TAB 500 MG 1000 MG: 500 CHEW TAB at 05:02

## 2020-02-28 RX ADMIN — INSULIN ASPART 6 UNITS: 100 INJECTION, SOLUTION INTRAVENOUS; SUBCUTANEOUS at 12:02

## 2020-02-28 RX ADMIN — HYDROCODONE BITARTRATE AND ACETAMINOPHEN 1 TABLET: 5; 325 TABLET ORAL at 02:02

## 2020-02-28 RX ADMIN — PANCRELIPASE 1 CAPSULE: 36000; 180000; 114000 CAPSULE, DELAYED RELEASE PELLETS ORAL at 08:02

## 2020-02-28 RX ADMIN — AMPICILLIN SODIUM AND SULBACTAM SODIUM 3 G: 2; 1 INJECTION, POWDER, FOR SOLUTION INTRAMUSCULAR; INTRAVENOUS at 09:02

## 2020-02-28 RX ADMIN — INSULIN ASPART 3 UNITS: 100 INJECTION, SOLUTION INTRAVENOUS; SUBCUTANEOUS at 08:02

## 2020-02-28 RX ADMIN — INSULIN ASPART 4 UNITS: 100 INJECTION, SOLUTION INTRAVENOUS; SUBCUTANEOUS at 07:02

## 2020-02-28 RX ADMIN — HYDROCODONE BITARTRATE AND ACETAMINOPHEN 1 TABLET: 5; 325 TABLET ORAL at 07:02

## 2020-02-28 RX ADMIN — HYDROCODONE BITARTRATE AND ACETAMINOPHEN 1 TABLET: 5; 325 TABLET ORAL at 01:02

## 2020-02-28 RX ADMIN — ONDANSETRON 8 MG: 8 TABLET, ORALLY DISINTEGRATING ORAL at 05:02

## 2020-02-28 RX ADMIN — INSULIN DETEMIR 15 UNITS: 100 INJECTION, SOLUTION SUBCUTANEOUS at 08:02

## 2020-02-28 RX ADMIN — CEFEPIME 2 G: 2 INJECTION, POWDER, FOR SOLUTION INTRAVENOUS at 12:02

## 2020-02-28 RX ADMIN — ONDANSETRON 8 MG: 2 INJECTION INTRAMUSCULAR; INTRAVENOUS at 01:02

## 2020-02-28 RX ADMIN — INSULIN ASPART 3 UNITS: 100 INJECTION, SOLUTION INTRAVENOUS; SUBCUTANEOUS at 01:02

## 2020-02-28 RX ADMIN — MIDODRINE HYDROCHLORIDE 2.5 MG: 2.5 TABLET ORAL at 08:02

## 2020-02-28 RX ADMIN — APIXABAN 2.5 MG: 2.5 TABLET, FILM COATED ORAL at 08:02

## 2020-02-28 RX ADMIN — LINEZOLID 600 MG: 600 INJECTION, SOLUTION INTRAVENOUS at 07:02

## 2020-02-28 RX ADMIN — SODIUM CHLORIDE, SODIUM LACTATE, POTASSIUM CHLORIDE, AND CALCIUM CHLORIDE: .6; .31; .03; .02 INJECTION, SOLUTION INTRAVENOUS at 12:02

## 2020-02-28 RX ADMIN — INSULIN ASPART 8 UNITS: 100 INJECTION, SOLUTION INTRAVENOUS; SUBCUTANEOUS at 04:02

## 2020-02-28 RX ADMIN — METRONIDAZOLE 500 MG: 500 INJECTION, SOLUTION INTRAVENOUS at 08:02

## 2020-02-28 RX ADMIN — HYDROCODONE BITARTRATE AND ACETAMINOPHEN 1 TABLET: 5; 325 TABLET ORAL at 08:02

## 2020-02-28 RX ADMIN — ONDANSETRON 8 MG: 2 INJECTION INTRAMUSCULAR; INTRAVENOUS at 02:02

## 2020-02-28 RX ADMIN — PIPERACILLIN SODIUM AND TAZOBACTAM SODIUM 4.5 G: 4; .5 INJECTION, POWDER, LYOPHILIZED, FOR SOLUTION INTRAVENOUS at 06:02

## 2020-02-28 RX ADMIN — PANCRELIPASE 1 CAPSULE: 36000; 180000; 114000 CAPSULE, DELAYED RELEASE PELLETS ORAL at 02:02

## 2020-02-28 RX ADMIN — ONDANSETRON 8 MG: 2 INJECTION INTRAMUSCULAR; INTRAVENOUS at 09:02

## 2020-02-28 NOTE — PLAN OF CARE
Problem: Occupational Therapy Goal  Goal: Occupational Therapy Goal  Description  Goals to be met by: 3/28/20     Patient will increase functional independence with ADLs by performing:    LE Dressing with Modified [place].  Grooming while standing with Modified [place].  Toileting from toilet with Modified [place] for hygiene and clothing management.   Supine to sit with Modified [place].  Step transfer with Modified [place]  Toilet transfer to toilet with Modified [place].  Increased functional strength to WFL for self care skills and functional mobility.  Upper extremity exercise program x10 reps per handout, with independence.     Outcome: Ongoing, Progressing     Pt would benefit from cont OT services in order to maximize functional independence. Recommending HHOT/PT at d/c. Owns recommended DME.

## 2020-02-28 NOTE — PROGRESS NOTES
Neuroendocrine Surgery Progress Note    NAEO.  Received cholecystostomy tube yesterday w/ no issues.  Drain w/ 240cc bilious output since placement.  Weaned down to 0.04 levo this AM.  Reports RUQ pain improved.  Tolerating diet w/ no N/V. Good UOP w/ 50-65cc/hr this AM.  Pt afebrile over 24 hours.    Tmax: 98.4   HR: 60-78   RR: 0-32   BP: /51-69   SpO2:     PE: General: AAO X 3; NAD         CV: RRR; no murmurs, rubs, or gallops         Resp: CTA b/l; no wheezes, crackles, or rhonchi         Abdomen: soft; NT/ND; biliary drain in place w/ bilious output    Labs: WBC: 12.5; H/H: 11.1/32.9; Cr: 3.4; T Bili: 1.2 --> 0.6 --> 0.4    A/P: 63 y/o M w/ hx of PMH morbid obesity (BMI 41), stage 1 hepatic fibrosis (biopsy negative for cirrhosis), chronic pancreatitis and extrahepatic portal vein stenosis with  large volume ascites requiring twice weekly paracenteses in addition to pancreatic pseudocyst as well as CAD s/p PCI X 5 and CABG X 3 now with cholecystitis, ELIEZER, CHF requiring pressor support s/p cholecystostomy tube  - continue to monitor cholecystostomy tube output; flush tube BID  - f/u drain cultures; remains on zyvox and zosyn; monitoring renal function  - continue care per primary; wean pressors as tolerated    Dot Davies MD PGY IV

## 2020-02-28 NOTE — PT/OT/SLP EVAL
"Occupational Therapy   Evaluation    Name: Jian Arrieta  MRN: [id number]  Admitting Diagnosis:  Sepsis 2 Days Post-Op    Recommendations:     Discharge Recommendations: home health PT, home health OT  Discharge Equipment Recommendations:  none  Barriers to discharge:  None    Assessment:     Jian Arrieta is a 64 y.o. male with a medical diagnosis of Sepsis.  He presents with swelling; pain . Performance deficits affecting function: weakness, impaired self care skills, impaired balance, impaired functional mobilty, impaired endurance, gait instability, edema, pain.      Pt would benefit from cont OT services in order to maximize functional independence. Recommending HHOT/PT at d/c. Owns recommended DME.     Rehab Prognosis: Good; patient would benefit from acute skilled OT services to address these deficits and reach maximum level of function.       Plan:     Patient to be seen 5 x/week to address the above listed problems via self-care/home management, therapeutic activities, therapeutic exercises  · Plan of Care Expires: 03/28/20  · Plan of Care Reviewed with: patient    Subjective     Chief Complaint: pain in side   Patient/Family Comments/goals: return home     Occupational Profile:  Living Environment: with spouse, SSH, 3 steps to enter home with B rails, WIS and/or tub/shower combo available  Previous level of function: mod I/independent; pt states, " I do whatever I want"   Equipment Used at Home:  walker, rolling, shower chair, bedside commode, power chair, hospital bed, rollator  Assistance upon Discharge: from spouse     Pain/Comfort:  · Pain Rating 1: 7/10  · Location - Side 1: Right  · Location - Orientation 1: lateral  · Location 1: flank  · Pain Addressed 1: Reposition, Distraction, Cessation of Activity    Patients cultural, spiritual, Sikh conflicts given the current situation:      Objective:     Communicated with: paola prior to session.     General Precautions: Standard, fall   Orthopedic " Precautions:N/A   Braces: N/A     Occupational Performance:    Bed Mobility:    · Patient completed Scooting/Bridging with contact guard assistance  · Patient completed Supine to Sit with minimum assistance and moderate assistance  · Patient completed Sit to Supine with contact guard assistance    Functional Mobility/Transfers:  · Patient completed Sit <> Stand Transfer with contact guard assistance  with  no assistive device   · Functional Mobility: CGA with HHA lateral steps to HOB     Activities of Daily Living:  · Pt declined participation     Cognitive/Visual Perceptual:  WFL       Physical Exam:  Balance:    -       WFL sitting balance; CGA standing EOB   Postural examination/scapula alignment:    -       Rounded shoulders  -       Forward head  Skin integrity: reddness BLEs   Edema:  Moderate /severe BLEs  Dominant hand:    -       right  Upper Extremity Range of Motion:   WFL for pt's needs based on observed function   Upper Extremity Strength:  WFL for pt's needs based on observed function    Strength:  Good   Fine Motor Coordination:    -       Intact    AMPAC 6 Click ADL:  AMPAC Total Score: 19    Treatment & Education:  Pt performing skills as above   Stood from EOB total of 5 x during session; 3 trials in repetition for additional axs   Education:    Patient left supine with all lines intact, call button in reach and bed alarm on    GOALS:   Multidisciplinary Problems     Occupational Therapy Goals        Problem: Occupational Therapy Goal    Goal Priority Disciplines Outcome Interventions   Occupational Therapy Goal     OT, PT/OT Ongoing, Progressing    Description:  Goals to be met by: 3/28/20     Patient will increase functional independence with ADLs by performing:    LE Dressing with Modified [place].  Grooming while standing with Modified [place].  Toileting from toilet with Modified [place] for hygiene and clothing management.   Supine to sit with Modified [place].  Step  transfer with Modified [place]  Toilet transfer to toilet with Modified [place].  Increased functional strength to WFL for self care skills and functional mobility.  Upper extremity exercise program x10 reps per handout, with independence.                      History:     Past Medical History:   Diagnosis Date    Alcohol abuse     Anasarca 1/28/2019    Arthropathy associated with neurological disorder 9/2/2015    Atherosclerosis     Charcot foot due to diabetes mellitus     Chronic combined systolic and diastolic heart failure 01/29/2019 1-28-19 Left VentricleModerate decreased ejection fraction at 30%. Normal left ventricular cavity size. Normal wall thickness observed. Grade I (mild) left ventricular diastolic dysfunction consistent with impaired relaxation. Normal left atrial pressure. Moderate, global hypokinesis(see wall scoring diagram). Right VentricleNormal cavity size, wall thickness and ejection fraction. Wall motion n    Chronic pancreatitis 1/28/2019    Colon polyps     approx 5 yrs ago    Coronary artery disease due to calcified coronary lesion 05/08/2015    5 stents on ASA      Diabetic polyneuropathy associated with type 2 diabetes mellitus 9/2/2015    Diverticulosis 1/28/2019    DM type 2 with diabetic peripheral neuropathy 2/4/2019    Encounter for blood transfusion     Essential hypertension 1/28/2019    Former smoker 8/26/2015    Healed ulcer of left foot on examination 6/20/2017    Hydrocele     approx 1.5 yrs ago    Hypoalbuminemia 2/4/2019    Lymphedema of both lower extremities 1/29/2019    Mixed hyperlipidemia 5/8/2015    Morbid obesity with BMI of 50.0-59.9, adult 5/8/2015    Onychomycosis of multiple toenails with type 2 diabetes mellitus and peripheral neuropathy 6/20/2017    Perianal cyst     approx 2 yrs ago    Pseudocyst of pancreas 1/28/2019 1-28-19 Liver has a cirrhotic morphology with no focal lesions.  Significant interval increase in ascites  when compared to prior exam which may account for patient's abdominal distension.  Hypodense air-fluid collection along the body of the pancreas which is slightly smaller when compared to prior CT.  Findings may relate to pancreatic necrosis with pancreatic pseudocysts with infected pseudocyst    Skin cancer     skin cancer    Sleep apnea 8/26/2015    Status post bariatric surgery 1/11/2016    Type 2 diabetes mellitus, with long-term current use of insulin 5/8/2015       Past Surgical History:   Procedure Laterality Date    ANGIOGRAPHY N/A 6/28/2019    Procedure: ANGIOGRAM-PV STENT;  Surgeon: E[facility] Diagnostic Provider;  Location: [facility] OR;  Service: Radiology;  Laterality: N/A;    ANGIOPLASTY      total x5 stents    COLONOSCOPY N/A 10/6/2015    Procedure: COLONOSCOPY;  Surgeon: Shekhar Richards MD;  Location: [facility] ([address] [place] [address]);  Service: Endoscopy;  Laterality: N/A;  BMI over 55/2nd floor case    5 day hold Plavix, Dr Kwadwo Arroyo    CORONARY ANGIOGRAPHY Right 3/20/2019    Procedure: ANGIOGRAM, CORONARY ARTERY;  Surgeon: Bob Duque MD;  Location: [facility] CATH LAB;  Service: Cardiology;  Laterality: Right;    CORONARY ARTERY BYPASS GRAFT  2017    x3    CORONARY BYPASS GRAFT ANGIOGRAPHY  3/20/2019    Procedure: Bypass graft study;  Surgeon: Bob Duque MD;  Location: [facility] CATH LAB;  Service: Cardiology;;    CYST REMOVAL      ENDOSCOPIC ULTRASOUND OF UPPER GASTROINTESTINAL TRACT N/A 2/26/2020    Procedure: ULTRASOUND, UPPER GI TRACT, ENDOSCOPIC;  Surgeon: Robbie Yang MD;  Location: [facility] ENDO;  Service: Endoscopy;  Laterality: N/A;    ESOPHAGOGASTRODUODENOSCOPY N/A 7/8/2019    Procedure: ESOPHAGOGASTRODUODENOSCOPY (EGD);  Surgeon: Huan Brumfield MD;  Location: [facility] ENDO;  Service: Endoscopy;  Laterality: N/A;    GASTRECTOMY      INSERTION OF DIALYSIS CATHETER N/A 5/17/2019    Procedure: pleurx;  Surgeon: [facility] Provider;  Location: [facility] OR 2ND FLR;  Service: General;  Laterality:  N/A;  Room 188/[facility]    KNEE ARTHROSCOPY      perianal surgery      perianal cyst removed       Time Tracking:     OT Date of Treatment: 02/28/20  OT Start Time: 1339  OT Stop Time: 1401  OT Total Time (min): 22 min    Billable Minutes:Evaluation 10  Therapeutic Activity 12    Tiana Delcid, OT  2/28/2020

## 2020-02-28 NOTE — PROGRESS NOTES
Progress Note  Nephrology      Consult Requested By: Tien Arboleda DO      SUBJECTIVE:     Overnight events  Patient is a 64 y.o. male     Patient Active Problem List   Diagnosis    Coronary artery disease due to calcified coronary lesion    Type 2 diabetes mellitus, with long-term current use of insulin    Sleep apnea    Status post bariatric surgery    Anasarca    Atherosclerosis    Chronic pancreatitis    Pseudocyst of pancreas    Chronic diastolic heart failure    Lymphedema of both lower extremities    Type 2 diabetes mellitus with diabetic neuropathy, with long-term current use of insulin    Hypoalbuminemia    Myocardiopathy    Other ascites    ELIEZER (acute kidney injury)    Anemia    NSTEMI (non-ST elevated myocardial infarction)    Paroxysmal atrial fibrillation    Hypertension associated with diabetes    Hyperlipidemia associated with type 2 diabetes mellitus    Obesity hypoventilation syndrome    Symptomatic anemia    Alteration in skin integrity    Venous stasis dermatitis of both lower extremities    Acute renal failure with specified lesion    S/P abdominal paracentesis    Decreased mobility    Discharge planning issues    Malnutrition of moderate degree    Hepatic fibrosis    Portal hypertension due to obstruction of extrahepatic portal vein    Venous stasis ulcer of left lower leg with edema of left lower leg    Chronic acquired lymphedema    Gastritis    Chronic kidney disease, stage 3    Obesity, Class III, BMI 40-49.9 (morbid obesity)    Decreased strength    Impaired functional mobility, balance, gait, and endurance    Medically noncompliant    Type 2 diabetes mellitus with stage 3 chronic kidney disease, without long-term current use of insulin    Non compliance with medical treatment    Chest pain    Hyperglycemia    Right lower quadrant abdominal pain    Dilation of biliary tract    Bacteremia    Sepsis    Cholecystitis     Past Medical History:    Diagnosis Date    Alcohol abuse     Anasarca 1/28/2019    Arthropathy associated with neurological disorder 9/2/2015    Atherosclerosis     Charcot foot due to diabetes mellitus     Chronic combined systolic and diastolic heart failure 01/29/2019 1-28-19 Left VentricleModerate decreased ejection fraction at 30%. Normal left ventricular cavity size. Normal wall thickness observed. Grade I (mild) left ventricular diastolic dysfunction consistent with impaired relaxation. Normal left atrial pressure. Moderate, global hypokinesis(see wall scoring diagram). Right VentricleNormal cavity size, wall thickness and ejection fraction. Wall motion n    Chronic pancreatitis 1/28/2019    Colon polyps     approx 5 yrs ago    Coronary artery disease due to calcified coronary lesion 05/08/2015    5 stents on ASA      Diabetic polyneuropathy associated with type 2 diabetes mellitus 9/2/2015    Diverticulosis 1/28/2019    DM type 2 with diabetic peripheral neuropathy 2/4/2019    Encounter for blood transfusion     Essential hypertension 1/28/2019    Former smoker 8/26/2015    Healed ulcer of left foot on examination 6/20/2017    Hydrocele     approx 1.5 yrs ago    Hypoalbuminemia 2/4/2019    Lymphedema of both lower extremities 1/29/2019    Mixed hyperlipidemia 5/8/2015    Morbid obesity with BMI of 50.0-59.9, adult 5/8/2015    Onychomycosis of multiple toenails with type 2 diabetes mellitus and peripheral neuropathy 6/20/2017    Perianal cyst     approx 2 yrs ago    Pseudocyst of pancreas 1/28/2019 1-28-19 Liver has a cirrhotic morphology with no focal lesions.  Significant interval increase in ascites when compared to prior exam which may account for patient's abdominal distension.  Hypodense air-fluid collection along the body of the pancreas which is slightly smaller when compared to prior CT.  Findings may relate to pancreatic necrosis with pancreatic pseudocysts with infected pseudocyst    Skin  cancer     skin cancer    Sleep apnea 8/26/2015    Status post bariatric surgery 1/11/2016    Type 2 diabetes mellitus, with long-term current use of insulin 5/8/2015              OBJECTIVE:     Vitals:    02/28/20 1515 02/28/20 1530 02/28/20 1545 02/28/20 1600   BP:       Pulse: 64 65 64 69   Resp: 14 14 15 (!) 25   Temp:       TempSrc:       SpO2: 98% 100% 100% 99%   Weight:       Height:           Temp: 96.9 °F (36.1 °C) (02/28/20 1200)  Pulse: 69 (02/28/20 1600)  Resp: (!) 25 (02/28/20 1600)  BP: 117/65 (02/28/20 1500)  SpO2: 99 % (02/28/20 1600)    Date 02/28/20 0700 - 02/29/20 0659   Shift 8435-0996 8264-6540 5724-9415 24 Hour Total   INTAKE   I.V.(mL/kg) 4.7(0)   4.7(0)   IV Piggyback 700   700   Shift Total(mL/kg) 704.7(6.1)   704.7(6.1)   OUTPUT   Urine(mL/kg/hr) 550(0.6) 90  640   Shift Total(mL/kg) 550(4.8) 90(0.8)  640(5.5)   Weight (kg) 115.5 115.5 115.5 115.5             Medications:   ceFEPime (MAXIPIME) IVPB  2 g Intravenous Q24H    insulin detemir U-100  15 Units Subcutaneous Daily    lipase-protease-amylase  1 capsule Oral TID    metronidazole  500 mg Intravenous Q8H      lactated ringers 75 mL/hr at 02/28/20 1221    norepinephrine bitartrate-D5W Stopped (02/28/20 0710)               Physical Exam:  General appearance:NAD  Weak  Abdominal dyscomfort  No SOB  No cough  Lungs: diminished breath sounds  Heart: Pulse 69  Abdomen: soft  Extremities: edema  Skin: dry  Laboratory:  ABG  Labs reviewed  Recent Results (from the past 336 hour(s))   Basic metabolic panel    Collection Time: 02/28/20 12:10 PM   Result Value Ref Range    Sodium 133 (L) 136 - 145 mmol/L    Potassium 3.9 3.5 - 5.1 mmol/L    Chloride 104 95 - 110 mmol/L    CO2 20 (L) 23 - 29 mmol/L    BUN, Bld 55 (H) 8 - 23 mg/dL    Creatinine 3.4 (H) 0.5 - 1.4 mg/dL    Calcium 7.8 (L) 8.7 - 10.5 mg/dL    Anion Gap 9 8 - 16 mmol/L   Basic metabolic panel    Collection Time: 02/28/20  4:27 AM   Result Value Ref Range    Sodium 134 (L) 136  - 145 mmol/L    Potassium 3.8 3.5 - 5.1 mmol/L    Chloride 105 95 - 110 mmol/L    CO2 20 (L) 23 - 29 mmol/L    BUN, Bld 55 (H) 8 - 23 mg/dL    Creatinine 3.4 (H) 0.5 - 1.4 mg/dL    Calcium 7.4 (L) 8.7 - 10.5 mg/dL    Anion Gap 9 8 - 16 mmol/L   Basic metabolic panel    Collection Time: 02/26/20  3:56 PM   Result Value Ref Range    Sodium 135 (L) 136 - 145 mmol/L    Potassium 3.9 3.5 - 5.1 mmol/L    Chloride 103 95 - 110 mmol/L    CO2 22 (L) 23 - 29 mmol/L    BUN, Bld 36 (H) 8 - 23 mg/dL    Creatinine 2.5 (H) 0.5 - 1.4 mg/dL    Calcium 7.8 (L) 8.7 - 10.5 mg/dL    Anion Gap 10 8 - 16 mmol/L     Recent Results (from the past 336 hour(s))   CBC with Automated Differential    Collection Time: 02/28/20  4:27 AM   Result Value Ref Range    WBC 12.54 3.90 - 12.70 K/uL    Hemoglobin 11.1 (L) 14.0 - 18.0 g/dL    Hematocrit 32.9 (L) 40.0 - 54.0 %    Platelets 125 (L) 150 - 350 K/uL   CBC with Automated Differential    Collection Time: 02/27/20  3:35 AM   Result Value Ref Range    WBC 23.44 (H) 3.90 - 12.70 K/uL    Hemoglobin 12.6 (L) 14.0 - 18.0 g/dL    Hematocrit 37.4 (L) 40.0 - 54.0 %    Platelets 157 150 - 350 K/uL   CBC auto differential    Collection Time: 02/27/20 12:59 AM   Result Value Ref Range    WBC 22.90 (H) 3.90 - 12.70 K/uL    Hemoglobin 12.6 (L) 14.0 - 18.0 g/dL    Hematocrit 37.4 (L) 40.0 - 54.0 %    Platelets 164 150 - 350 K/uL     Urinalysis  No results for input(s): COLORU, CLARITYU, SPECGRAV, PHUR, PROTEINUA, GLUCOSEU, BILIRUBINCON, BLOODU, WBCU, RBCU, BACTERIA, MUCUS, NITRITE, LEUKOCYTESUR, UROBILINOGEN, HYALINECASTS in the last 24 hours.    Diagnostic Results:  X-Ray: Reviewed  US: Reviewed  Echo: Reviewed  ACCESS    ASSESSMENT/PLAN:   Cholecystitis  Cholecystostomy tube placement  ELIEZER/CKD 4  Diabetes Mellitus  Diabetic Retinopathy  CAD   cc/24 h  UA protein 2+, blood trace  US  Right kidney: 11.2 cm. No hydronephrosis.  Left kidney: 11.4 cm. No hydronephrosis  No hydronephrosis.  Normal bilateral  resistive indices.     Hyponatremia  Metabolic acidosis  Metabolic bone disease  Replace electrolytes prn  Corrected calcium approximately 9.5  Poor nutrition  Albumin 1.8  Hb 11.1  Hypotension  Blood pressure 117/65  Echo 2019  · Mildly decreased left ventricular systolic function. The estimated ejection fraction is 45-50%  · Left ventricular diastolic dysfunction.  · Normal right ventricular systolic function.  · Normal central venous pressure (3 mm Hg).  ·     Avoid nephrotoxic agents, hypotension, hypovolemia  Weight daily  I and O      ·     LR   Will follow up    BMP daily  Continue antibiotics

## 2020-02-28 NOTE — CONSULTS
"  [facility]  Adult Nutrition  Consult Note    SUMMARY     Recommendations    Recommendation:   1. Continue current diabetic diet order with addition of cardiac restrictions.  2. Addition of Optisource ONS-any flavor BID to supplement pt protein needs.     Goals:   1. Pt PO intake >/= 75% EEN/EPN daily.     Nutrition Goal Status: new  Communication of RD Recs: other (comment)(POC)    Reason for Assessment    Reason For Assessment: consult(Patient identified as at risk of developing pressure injury.)  Diagnosis: infection/sepsis(sepsis)  Relevant Medical History: DM, diverticulosis, ETOH abuse, atherosclerosis, CHF, CAD, anasarca, chronic pancreatitis, HTN, HLD, skin cancer, hypoalbuminermia  Interdisciplinary Rounds: did not attend  General Information Comments: RD attempted to see and verbally wake pt x2 today. Pt sleeping with blanket over head and did not respond. Per chart review pt tolerateing diet with no N/V/D/C at this time. NFPE unable to be completed today, RD will follow up.  Nutrition Discharge Planning: d/c on diabetic/cardiac diet    Nutrition Risk Screen    Nutrition Risk Screen: no indicators present    Nutrition/Diet History    Food Preferences: YONIS  Spiritual, Cultural Beliefs, Judaism Practices, Values that Affect Care: no  Food Allergies: NKFA  Factors Affecting Nutritional Intake: None identified at this time    Anthropometrics    Temp: 96.9 °F (36.1 °C)  Height Method: Stated  Height: 5' 7" (170.2 cm)  Height (inches): 67 in  Weight Method: Bed Scale  Weight: 115.5 kg (254 lb 10.1 oz)  Weight (lb): 254.63 lb  Ideal Body Weight (IBW), Male: 148 lb  % Ideal Body Weight, Male (lb): 172.05 %  BMI (Calculated): 39.9  BMI Grade: 35 - 39.9 - obesity - grade II       Lab/Procedures/Meds    Pertinent Labs Reviewed: reviewed  Pertinent Labs Comments: H/H 11.1/32.9, Na 134, Cl 20, BUN 55, Cr 3.4, eGFr 18, Glu 216, Ca 7.4, Alk Phos 197, Total Pro 4.7, Alb 1.8, A1C 12.4  Pertinent " Medications Reviewed: reviewed  Pertinent Medications Comments: ampicillin-sulbatam, insulin detemir, linezolid, lipase-protease-amylase, lactated ringers, norepinephrine    Physical Findings/Assessment    Andrew Score: 12    Estimated/Assessed Needs    Weight Used For Calorie Calculations: 115.5 kg (254 lb 10.1 oz)  Energy Calorie Requirements (kcal): 2279(MSJ x 1.2)  Energy Need Method: [place]-St Jeor(x 1.2)  Protein Requirements: 116(1.0 gm/kg)  Weight Used For Protein Calculations: 115.5 kg (254 lb 10.1 oz)     Estimated Fluid Requirement Method: RDA Method(or Per MD)  RDA Method (mL): 2279  CHO Requirement: 256 gm/day      Nutrition Prescription Ordered    Current Diet Order: Diabetic, 2000 kcal    Evaluation of Received Nutrient/Fluid Intake    I/O: 667.5/1303  Energy Calories Required: meeting needs  Protein Required: meeting needs  Fluid Required: meeting needs  Comments: LBM 2/24/20  Tolerance: tolerating  % Intake of Estimated Energy Needs: 75 - 100 %  % Meal Intake: 75 - 100 %    Nutrition Risk    Level of Risk/Frequency of Follow-up: (1 x/week)     Assessment and Plan    * Sepsis  Contributing Nutrition Diagnosis  Increased nutrient needs, protein    Related to (etiology):   Infection, and pt at risk for pressure injury    Signs and Symptoms (as evidenced by):   Pt diagnosed with sepsis, and risk for pressure injury     Interventions:  [employer]     Nutrition Diagnosis Status:   New           Monitor and Evaluation    Food and Nutrient Intake: energy intake, food and beverage intake  Food and Nutrient Adminstration: diet order  Knowledge/Beliefs/Attitudes: food and nutrition knowledge/skill  Physical Activity and Function: nutrition-related ADLs and IADLs  Anthropometric Measurements: weight, weight change, body mass index  Biochemical Data, Medical Tests and Procedures: electrolyte and renal panel, gastrointestinal profile, glucose/endocrine profile, inflammatory profile, lipid profile      Malnutrition Assessment         Unable to assess RD to follow up    Nutrition Follow-Up    RD Follow-up?: Yes

## 2020-02-28 NOTE — PLAN OF CARE
Problem: Physical Therapy Goal  Goal: Physical Therapy Goal  Description  Goals to be met by: 3/28/2020     Patient will increase functional independence with mobility by performin. Supine to sit with Modified [place]  2. Sit to stand transfer with Modified [place]  3. Bed to chair transfer with Modified [place] using appropriate AD  4. Gait  x 100 feet with Modified [place] using appropriate AD.   5. Lower extremity exercise program x15 reps per handout, with supervision     Outcome: Ongoing, Progressing     PT initial evaluation completed. Plan of care and goals established and discussed with patient.    Discharge Recommendation: HHPT  DME Recommendation: [facility]

## 2020-02-28 NOTE — ASSESSMENT & PLAN NOTE
Contributing Nutrition Diagnosis  Increased nutrient needs, protein    Related to (etiology):   Infection, and pt at risk for pressure injury    Signs and Symptoms (as evidenced by):   Pt diagnosed with sepsis, and risk for pressure injury     Interventions:  [employer]     Nutrition Diagnosis Status:   New

## 2020-02-28 NOTE — PLAN OF CARE
Pt remains on RA with adequate O2 sats. Pt off of pressors. VSS. Biliary drain to gravity with adequate output. BS monitored as ordered. Labs monitored closely. George intact with adequate UO. Pain managed with PRN pain medications. Pt had some complaints of nausea. PRN Zofran given. Pt remains afebrile. Bed alarm set. Call light within reach. Will continue to monitor and report off to oncoming nurse to assume care.

## 2020-02-28 NOTE — PROGRESS NOTES
Patient is asking for more pain medication, he is informed on the frequency of the pain meds. He verbalized understanding and he is encouraged to wait until his medications take effect so he can get dianne relief

## 2020-02-28 NOTE — PT/OT/SLP EVAL
Physical Therapy Evaluation    Patient Name:  Jian Arrieta   MRN:  [id number]    Recommendations:     Discharge Recommendations:  home health PT   Discharge Equipment Recommendations: none   Barriers to discharge: None    Assessment:     Jian Arrieta is a 64 y.o. male admitted with a medical diagnosis of Sepsis.  He presents with the following impairments/functional limitations:  weakness, impaired endurance, impaired functional mobilty, gait instability, decreased lower extremity function, pain, decreased coordination, decreased safety awareness. PT initial evaluation completed. Plan of care and goals established and discussed with patient.    Rehab Prognosis: Good; patient would benefit from acute skilled PT services to address these deficits and reach maximum level of function.    Recent Surgery: Procedure(s) (LRB):  ULTRASOUND, UPPER GI TRACT, ENDOSCOPIC (N/A) 2 Days Post-Op    Plan:     During this hospitalization, patient to be seen 5 x/week to address the identified rehab impairments via gait training, therapeutic activities, therapeutic exercises, neuromuscular re-education and progress toward the following goals:    · Plan of Care Expires:  03/28/20    Subjective     Chief Complaint: pain in R flank  Patient/Family Comments/goals: Pt agreed to PT Eval    Pain/Comfort:  · Pain Rating 1: 7/10  · Location - Side 1: Right  · Location - Orientation 1: lateral  · Location 1: flank  · Pain Addressed 1: Reposition, Distraction  · Pain Rating Post-Intervention 1: (Did not rate)    Patients cultural, spiritual, Worship conflicts given the current situation: no    Living Environment:  Pt lives c/ his wife in [facility].  Prior to admission, patients level of function Mod I; reports no DME use in recent times.  Equipment used at home: walker, rolling, shower chair, bedside commode, power chair, hospital bed, rollator.  DME owned (not currently used): none.  Upon discharge, patient will have assistance from  spouse2.    Objective:     Communicated with RNMartina prior to session.  Patient found supine with blood pressure cuff, peripheral IV, telemetry, SUZI drain  upon PT entry to room.    General Precautions: Standard, fall   Orthopedic Precautions:N/A   Braces: N/A     Exams:  · Cognitive Exam:  Patient is oriented to Person, Place and Situation  · Gross Motor Coordination:  WFL  · Postural Exam:  Patient presented with the following abnormalities:    · -       Habitus  · Skin Integrity/Edema:      · -       Edema: Mild marysol LE; hx of chronic lymphedema  · RLE ROM: WFL  · RLE Strength: WFL  · LLE ROM: WFL  · LLE Strength: WFL    Functional Mobility:  · Bed Mobility:     · Rolling Left:  supervision  · Scooting: supervision  · Supine to Sit: stand by assistance  · Sit to Supine: stand by assistance  · Transfers:     · Sit to Stand:  stand by assistance with hand-held assist  · Gait: side steps to HOB x 4 steps at SBA c/ no DME; pt demo wide CLEO c/ reduced marysol step lengths; no LOB or buckling noted  · Balance: Dynamic gait- fair      Therapeutic Activities and Exercises:  PT eval completed c/ progressive mobility as detailed above.  Pt declined sitting OOB in chair; educated on OOB activity 1-3hrs in chair as tolerated.     AM-PAC 6 CLICK MOBILITY  Total Score:18     Patient left HOB elevated with all lines intact, call button in reach and RN notified.    GOALS:   Multidisciplinary Problems     Physical Therapy Goals        Problem: Physical Therapy Goal    Goal Priority Disciplines Outcome Goal Variances Interventions   Physical Therapy Goal     PT, PT/OT Ongoing, Progressing     Description:  Goals to be met by: 3/28/2020     Patient will increase functional independence with mobility by performin. Supine to sit with Modified Catron  2. Sit to stand transfer with Modified Catron  3. Bed to chair transfer with Modified Catron using appropriate AD  4. Gait  x 100 feet with Modified Catron  using appropriate AD.   5. Lower extremity exercise program x15 reps per handout, with supervision                      History:     Past Medical History:   Diagnosis Date    Alcohol abuse     Anasarca 1/28/2019    Arthropathy associated with neurological disorder 9/2/2015    Atherosclerosis     Charcot foot due to diabetes mellitus     Chronic combined systolic and diastolic heart failure 01/29/2019 1-28-19 Left VentricleModerate decreased ejection fraction at 30%. Normal left ventricular cavity size. Normal wall thickness observed. Grade I (mild) left ventricular diastolic dysfunction consistent with impaired relaxation. Normal left atrial pressure. Moderate, global hypokinesis(see wall scoring diagram). Right VentricleNormal cavity size, wall thickness and ejection fraction. Wall motion n    Chronic pancreatitis 1/28/2019    Colon polyps     approx 5 yrs ago    Coronary artery disease due to calcified coronary lesion 05/08/2015    5 stents on ASA      Diabetic polyneuropathy associated with type 2 diabetes mellitus 9/2/2015    Diverticulosis 1/28/2019    DM type 2 with diabetic peripheral neuropathy 2/4/2019    Encounter for blood transfusion     Essential hypertension 1/28/2019    Former smoker 8/26/2015    Healed ulcer of left foot on examination 6/20/2017    Hydrocele     approx 1.5 yrs ago    Hypoalbuminemia 2/4/2019    Lymphedema of both lower extremities 1/29/2019    Mixed hyperlipidemia 5/8/2015    Morbid obesity with BMI of 50.0-59.9, adult 5/8/2015    Onychomycosis of multiple toenails with type 2 diabetes mellitus and peripheral neuropathy 6/20/2017    Perianal cyst     approx 2 yrs ago    Pseudocyst of pancreas 1/28/2019 1-28-19 Liver has a cirrhotic morphology with no focal lesions.  Significant interval increase in ascites when compared to prior exam which may account for patient's abdominal distension.  Hypodense air-fluid collection along the body of the pancreas which  is slightly smaller when compared to prior CT.  Findings may relate to pancreatic necrosis with pancreatic pseudocysts with infected pseudocyst    Skin cancer     skin cancer    Sleep apnea 8/26/2015    Status post bariatric surgery 1/11/2016    Type 2 diabetes mellitus, with long-term current use of insulin 5/8/2015       Past Surgical History:   Procedure Laterality Date    ANGIOGRAPHY N/A 6/28/2019    Procedure: ANGIOGRAM-PV STENT;  Surgeon: [facility] Diagnostic Provider;  Location: [facility] OR;  Service: Radiology;  Laterality: N/A;    ANGIOPLASTY      total x5 stents    COLONOSCOPY N/A 10/6/2015    Procedure: COLONOSCOPY;  Surgeon: Shekhar Richards MD;  Location: [facility] ENDO (2ND FLR);  Service: Endoscopy;  Laterality: N/A;  BMI over 55/2nd floor case    5 day hold Plavix, Dr Kwadwo Arroyo    CORONARY ANGIOGRAPHY Right 3/20/2019    Procedure: ANGIOGRAM, CORONARY ARTERY;  Surgeon: Bob Duque MD;  Location: [facility] CATH LAB;  Service: Cardiology;  Laterality: Right;    CORONARY ARTERY BYPASS GRAFT  2017    x3    CORONARY BYPASS GRAFT ANGIOGRAPHY  3/20/2019    Procedure: Bypass graft study;  Surgeon: Bob Duque MD;  Location: [facility] CATH LAB;  Service: Cardiology;;    CYST REMOVAL      ENDOSCOPIC ULTRASOUND OF UPPER GASTROINTESTINAL TRACT N/A 2/26/2020    Procedure: ULTRASOUND, UPPER GI TRACT, ENDOSCOPIC;  Surgeon: Robbie Yang MD;  Location: [facility] ENDO;  Service: Endoscopy;  Laterality: N/A;    ESOPHAGOGASTRODUODENOSCOPY N/A 7/8/2019    Procedure: ESOPHAGOGASTRODUODENOSCOPY (EGD);  Surgeon: Huan Brumfield MD;  Location: [facility] ENDO;  Service: Endoscopy;  Laterality: N/A;    GASTRECTOMY      INSERTION OF DIALYSIS CATHETER N/A 5/17/2019    Procedure: pleurx;  Surgeon: [facility] Diagnostic Provider;  Location: [facility] OR 2ND FLR;  Service: General;  Laterality: N/A;  Room 188/[place]    KNEE ARTHROSCOPY      perianal surgery      perianal cyst removed       Time Tracking:     PT Received On: 02/28/20  PT  Start Time: 1339     PT Stop Time: 1401  PT Total Time (min): 22 min co-treat c/ OT     Billable Minutes: Evaluation 15    Leticia Ovalle PT, DPT  2/28/2020

## 2020-02-28 NOTE — PLAN OF CARE
Recommendation:   1. Continue current diabetic diet order with addition of cardiac restrictions.  2. Addition of Optisource ONS-any flavor BID to supplement pt protein needs.     Goals:   1. Pt PO intake >/= 75% EEN/EPN daily.     Nutrition Goal Status: new  Communication of RD Recs: other (comment)(POC)

## 2020-02-28 NOTE — PROGRESS NOTES
Pharmacist Renal Dose Adjustment Note    Jian Arrieta is a 64 y.o. male being treated with the medication cefepime    Patient Data:    Vital Signs (Most Recent):  Temp: 97.7 °F (36.5 °C) (02/28/20 0330)  Pulse: 67 (02/28/20 1100)  Resp: (!) 25 (02/28/20 1100)  BP: (!) 120/58 (02/28/20 1100)  SpO2: 100 % (02/28/20 1115)   Vital Signs (72h Range):  Temp:  [96.7 °F (35.9 °C)-102.9 °F (39.4 °C)]   Pulse:  []   Resp:  [0-34]   BP: ()/(43-93)   SpO2:  [69 %-100 %]   Arterial Line BP: ()/(42-75)      Recent Labs   Lab 02/26/20  1556 02/27/20  0335 02/28/20  0427   CREATININE 2.5* 3.0* 3.4*  3.4*     Serum creatinine: 3.4 mg/dL (H) 02/28/20 0427  Estimated creatinine clearance: 26.3 mL/min (A)    Medication cefepime dose: 2 gm frequency q12h will be changed to medication cefepime dose 2 gm frequency q24h    Pharmacist's Name: María Greene  Pharmacist's Extension: [phone]

## 2020-02-28 NOTE — PROGRESS NOTES
Patient was awaken to give him a pain pill. Patient  drain was emptied noted 200cc of bilious colored drainage.

## 2020-02-28 NOTE — PROGRESS NOTES
Received careon patient he is lying in bed with his wife at    Bedside, all  IVF's infusing at ordered rate. Patient is currentl on Levo for B/P control.Noted patient have generalized edema, it is notable to his left arm. Patient extremity  Is elevated on a pillow. POC is discussed withpatient and his wife, verbalized understanding.Ongoing care, patient has his call bell within reach.

## 2020-02-29 PROBLEM — K83.8 DILATION OF BILIARY TRACT: Status: RESOLVED | Noted: 2020-02-24 | Resolved: 2020-02-29

## 2020-02-29 PROBLEM — R07.9 CHEST PAIN: Status: RESOLVED | Noted: 2020-02-23 | Resolved: 2020-02-29

## 2020-02-29 LAB
ALBUMIN SERPL BCP-MCNC: 2 G/DL (ref 3.5–5.2)
ALP SERPL-CCNC: 183 U/L (ref 55–135)
ALT SERPL W/O P-5'-P-CCNC: 15 U/L (ref 10–44)
ANION GAP SERPL CALC-SCNC: 6 MMOL/L (ref 8–16)
ANION GAP SERPL CALC-SCNC: 6 MMOL/L (ref 8–16)
AST SERPL-CCNC: 11 U/L (ref 10–40)
BACTERIA BLD CULT: ABNORMAL
BACTERIA SPEC AEROBE CULT: ABNORMAL
BACTERIA SPEC AEROBE CULT: ABNORMAL
BILIRUB SERPL-MCNC: 0.4 MG/DL (ref 0.1–1)
BUN SERPL-MCNC: 58 MG/DL (ref 8–23)
BUN SERPL-MCNC: 58 MG/DL (ref 8–23)
CALCIUM SERPL-MCNC: 7.9 MG/DL (ref 8.7–10.5)
CALCIUM SERPL-MCNC: 7.9 MG/DL (ref 8.7–10.5)
CHLORIDE SERPL-SCNC: 107 MMOL/L (ref 95–110)
CHLORIDE SERPL-SCNC: 107 MMOL/L (ref 95–110)
CO2 SERPL-SCNC: 23 MMOL/L (ref 23–29)
CO2 SERPL-SCNC: 23 MMOL/L (ref 23–29)
CREAT SERPL-MCNC: 3.5 MG/DL (ref 0.5–1.4)
CREAT SERPL-MCNC: 3.5 MG/DL (ref 0.5–1.4)
EST. GFR  (AFRICAN AMERICAN): 20 ML/MIN/1.73 M^2
EST. GFR  (AFRICAN AMERICAN): 20 ML/MIN/1.73 M^2
EST. GFR  (NON AFRICAN AMERICAN): 17 ML/MIN/1.73 M^2
EST. GFR  (NON AFRICAN AMERICAN): 17 ML/MIN/1.73 M^2
GLUCOSE SERPL-MCNC: 128 MG/DL (ref 70–110)
GLUCOSE SERPL-MCNC: 128 MG/DL (ref 70–110)
MAGNESIUM SERPL-MCNC: 2.3 MG/DL (ref 1.6–2.6)
PHOSPHATE SERPL-MCNC: 4.1 MG/DL (ref 2.7–4.5)
POCT GLUCOSE: 146 MG/DL (ref 70–110)
POCT GLUCOSE: 165 MG/DL (ref 70–110)
POCT GLUCOSE: 224 MG/DL (ref 70–110)
POTASSIUM SERPL-SCNC: 3.8 MMOL/L (ref 3.5–5.1)
POTASSIUM SERPL-SCNC: 3.8 MMOL/L (ref 3.5–5.1)
PROT SERPL-MCNC: 5.1 G/DL (ref 6–8.4)
SODIUM SERPL-SCNC: 136 MMOL/L (ref 136–145)
SODIUM SERPL-SCNC: 136 MMOL/L (ref 136–145)

## 2020-02-29 PROCEDURE — 63600175 PHARM REV CODE 636 W HCPCS: Performed by: FAMILY MEDICINE

## 2020-02-29 PROCEDURE — S0030 INJECTION, METRONIDAZOLE: HCPCS | Performed by: STUDENT IN AN ORGANIZED HEALTH CARE EDUCATION/TRAINING PROGRAM

## 2020-02-29 PROCEDURE — 25000003 PHARM REV CODE 250: Performed by: NURSE PRACTITIONER

## 2020-02-29 PROCEDURE — 25000003 PHARM REV CODE 250: Performed by: FAMILY MEDICINE

## 2020-02-29 PROCEDURE — 83735 ASSAY OF MAGNESIUM: CPT

## 2020-02-29 PROCEDURE — 25000003 PHARM REV CODE 250: Performed by: HOSPITALIST

## 2020-02-29 PROCEDURE — 94761 N-INVAS EAR/PLS OXIMETRY MLT: CPT

## 2020-02-29 PROCEDURE — 63600175 PHARM REV CODE 636 W HCPCS: Performed by: NURSE PRACTITIONER

## 2020-02-29 PROCEDURE — 36415 COLL VENOUS BLD VENIPUNCTURE: CPT

## 2020-02-29 PROCEDURE — 11000001 HC ACUTE MED/SURG PRIVATE ROOM

## 2020-02-29 PROCEDURE — 63600175 PHARM REV CODE 636 W HCPCS: Performed by: STUDENT IN AN ORGANIZED HEALTH CARE EDUCATION/TRAINING PROGRAM

## 2020-02-29 PROCEDURE — 80053 COMPREHEN METABOLIC PANEL: CPT

## 2020-02-29 PROCEDURE — 25000003 PHARM REV CODE 250: Performed by: STUDENT IN AN ORGANIZED HEALTH CARE EDUCATION/TRAINING PROGRAM

## 2020-02-29 PROCEDURE — 63600175 PHARM REV CODE 636 W HCPCS: Mod: JG | Performed by: INTERNAL MEDICINE

## 2020-02-29 PROCEDURE — 84100 ASSAY OF PHOSPHORUS: CPT

## 2020-02-29 PROCEDURE — P9047 ALBUMIN (HUMAN), 25%, 50ML: HCPCS | Mod: JG | Performed by: INTERNAL MEDICINE

## 2020-02-29 RX ORDER — ALBUMIN HUMAN 250 G/1000ML
12.5 SOLUTION INTRAVENOUS ONCE
Status: COMPLETED | OUTPATIENT
Start: 2020-02-29 | End: 2020-02-29

## 2020-02-29 RX ADMIN — METRONIDAZOLE 500 MG: 500 INJECTION, SOLUTION INTRAVENOUS at 08:02

## 2020-02-29 RX ADMIN — SODIUM CHLORIDE, SODIUM LACTATE, POTASSIUM CHLORIDE, AND CALCIUM CHLORIDE: .6; .31; .03; .02 INJECTION, SOLUTION INTRAVENOUS at 02:02

## 2020-02-29 RX ADMIN — ONDANSETRON 8 MG: 2 INJECTION INTRAMUSCULAR; INTRAVENOUS at 04:02

## 2020-02-29 RX ADMIN — CEFEPIME 2 G: 2 INJECTION, POWDER, FOR SOLUTION INTRAVENOUS at 01:02

## 2020-02-29 RX ADMIN — METRONIDAZOLE 500 MG: 500 INJECTION, SOLUTION INTRAVENOUS at 04:02

## 2020-02-29 RX ADMIN — APIXABAN 2.5 MG: 2.5 TABLET, FILM COATED ORAL at 08:02

## 2020-02-29 RX ADMIN — HYDROCODONE BITARTRATE AND ACETAMINOPHEN 1 TABLET: 5; 325 TABLET ORAL at 08:02

## 2020-02-29 RX ADMIN — ONDANSETRON 8 MG: 2 INJECTION INTRAMUSCULAR; INTRAVENOUS at 05:02

## 2020-02-29 RX ADMIN — INSULIN DETEMIR 15 UNITS: 100 INJECTION, SOLUTION SUBCUTANEOUS at 08:02

## 2020-02-29 RX ADMIN — ALBUMIN (HUMAN) 12.5 G: 25 SOLUTION INTRAVENOUS at 11:02

## 2020-02-29 RX ADMIN — HYDROCODONE BITARTRATE AND ACETAMINOPHEN 1 TABLET: 5; 325 TABLET ORAL at 02:02

## 2020-02-29 RX ADMIN — INSULIN ASPART 2 UNITS: 100 INJECTION, SOLUTION INTRAVENOUS; SUBCUTANEOUS at 08:02

## 2020-02-29 RX ADMIN — CALCIUM CARBONATE (ANTACID) CHEW TAB 500 MG 1000 MG: 500 CHEW TAB at 03:02

## 2020-02-29 RX ADMIN — MIDODRINE HYDROCHLORIDE 2.5 MG: 2.5 TABLET ORAL at 08:02

## 2020-02-29 RX ADMIN — PANCRELIPASE 1 CAPSULE: 36000; 180000; 114000 CAPSULE, DELAYED RELEASE PELLETS ORAL at 03:02

## 2020-02-29 RX ADMIN — PANCRELIPASE 1 CAPSULE: 36000; 180000; 114000 CAPSULE, DELAYED RELEASE PELLETS ORAL at 08:02

## 2020-02-29 RX ADMIN — METRONIDAZOLE 500 MG: 500 INJECTION, SOLUTION INTRAVENOUS at 11:02

## 2020-02-29 RX ADMIN — DIPHENHYDRAMINE HYDROCHLORIDE 12.5 MG: 50 INJECTION, SOLUTION INTRAMUSCULAR; INTRAVENOUS at 04:02

## 2020-02-29 NOTE — ASSESSMENT & PLAN NOTE
Continue zosyn iv  Routine Gram stain awilda bottle: Gram negative rods P   Blood Culture, Routine Results called to and read back by: Kylah Bragg RN  02/26/2020   P   Blood Culture, Routine 06:34 P   Blood Culture, Routine Gram stain aer bottle: Gram negative rods P   Blood Culture, Routine Positive results previously called 02/26/2020  23:54 P   Blood Culture, Routine ESCHERICHIA COLIAbnormal  P   Resulting Agency [facility]   Susceptibility      Escherichia coli     CULTURE, BLOOD (Preliminary)     Amikacin <=8 mcg/mL Sensitive     Amox/K Clav'ate <=8/4 mcg/mL Sensitive     Amp/Sulbactam 16/8 mcg/mL Intermediate     Ampicillin >16 mcg/mL Resistant     Cefazolin 4 mcg/mL Sensitive     Ceftriaxone <=1 mcg/mL Sensitive     Ciprofloxacin >2 mcg/mL Resistant     Ertapenem <=0.5 mcg/mL Sensitive     Gentamicin >8 mcg/mL Resistant     Levofloxacin >4 mcg/mL Resistant     Meropenem <=1 mcg/mL Sensitive     Piperacillin/Tazo <=8 mcg/mL Sensitive     Tetracycline <=2 mcg/mL Sensitive     Tobramycin >8 mcg/mL Resistant     Trimeth/Sulfa <=0.5/9.5 m... Sensitive

## 2020-02-29 NOTE — ASSESSMENT & PLAN NOTE
Continue zosyn iv  Routine Gram stain awilda bottle: Gram negative rods P   Blood Culture, Routine Results called to and read back by: Kylah Bragg RN  02/26/2020   P   Blood Culture, Routine 06:34 P   Blood Culture, Routine Gram stain aer bottle: Gram negative rods P   Blood Culture, Routine Positive results previously called 02/26/2020  23:54 P   Blood Culture, Routine ESCHERICHIA COLIAbnormal  P   Resulting Agency [facility]   Susceptibility      Escherichia coli     CULTURE, BLOOD (Preliminary)     Amikacin <=8 mcg/mL Sensitive     Amox/K Clav'ate <=8/4 mcg/mL Sensitive     Amp/Sulbactam 16/8 mcg/mL Intermediate     Ampicillin >16 mcg/mL Resistant     Cefazolin 4 mcg/mL Sensitive     Ceftriaxone <=1 mcg/mL Sensitive     Ciprofloxacin >2 mcg/mL Resistant     Ertapenem <=0.5 mcg/mL Sensitive     Gentamicin >8 mcg/mL Resistant     Levofloxacin >4 mcg/mL Resistant     Meropenem <=1 mcg/mL Sensitive     Piperacillin/Tazo <=8 mcg/mL Sensitive     Tetracycline <=2 mcg/mL Sensitive     Tobramycin >8 mcg/mL Resistant     Trimeth/Sulfa <=0.5/9.5 m... Sensitive                2/29 continue current double abx  afebrile

## 2020-02-29 NOTE — ASSESSMENT & PLAN NOTE
2/27  GI had recommended an MRCP, but the pt already had a Cholecystostomy tube placement this mormning,   Continue symptomatic management  Off pressors  2/28 add midodrine for his low BP.  Routine Gram stain awilda bottle: Gram negative rods P   Blood Culture, Routine Results called to and read back by: Kylah Bragg RN  02/26/2020   P   Blood Culture, Routine 06:34 P   Blood Culture, Routine Gram stain aer bottle: Gram negative rods P   Blood Culture, Routine Positive results previously called 02/26/2020  23:54 P   Blood Culture, Routine ESCHERICHIA COLIAbnormal  P   Resulting Agency [facility]   Susceptibility      Escherichia coli     CULTURE, BLOOD (Preliminary)     Amikacin <=8 mcg/mL Sensitive     Amox/K Clav'ate <=8/4 mcg/mL Sensitive     Amp/Sulbactam 16/8 mcg/mL Intermediate     Ampicillin >16 mcg/mL Resistant     Cefazolin 4 mcg/mL Sensitive     Ceftriaxone <=1 mcg/mL Sensitive     Ciprofloxacin >2 mcg/mL Resistant     Ertapenem <=0.5 mcg/mL Sensitive     Gentamicin >8 mcg/mL Resistant     Levofloxacin >4 mcg/mL Resistant     Meropenem <=1 mcg/mL Sensitive     Piperacillin/Tazo <=8 mcg/mL Sensitive     Tetracycline <=2 mcg/mL Sensitive     Tobramycin >8 mcg/mL Resistant     Trimeth/Sulfa <=0.5/9.5 m... Sensitive                2/29 pt is being treated for ecoli and prvencia.  Ok to transfer to [facility], off pressors

## 2020-02-29 NOTE — PROGRESS NOTES
Progress Note  Nephrology      Consult Requested By: Tien Arboleda DO  Reason for Consult: ARF    SUBJECTIVE:     Review of Systems   Constitutional: Negative for chills and fever.   Respiratory: Negative for cough and shortness of breath.    Cardiovascular: Negative for chest pain and leg swelling.   Gastrointestinal: Positive for abdominal pain, nausea and vomiting (vomiting with any PO intake ).     Patient Active Problem List   Diagnosis    Coronary artery disease due to calcified coronary lesion    Type 2 diabetes mellitus, with long-term current use of insulin    Sleep apnea    Status post bariatric surgery    Anasarca    Atherosclerosis    Chronic pancreatitis    Pseudocyst of pancreas    Chronic diastolic heart failure    Lymphedema of both lower extremities    Type 2 diabetes mellitus with diabetic neuropathy, with long-term current use of insulin    Hypoalbuminemia    Myocardiopathy    Other ascites    ELIEZER (acute kidney injury)    Anemia    NSTEMI (non-ST elevated myocardial infarction)    Paroxysmal atrial fibrillation    Hypertension associated with diabetes    Hyperlipidemia associated with type 2 diabetes mellitus    Obesity hypoventilation syndrome    Symptomatic anemia    Alteration in skin integrity    Venous stasis dermatitis of both lower extremities    Acute renal failure with specified lesion    S/P abdominal paracentesis    Decreased mobility    Discharge planning issues    Malnutrition of moderate degree    Hepatic fibrosis    Portal hypertension due to obstruction of extrahepatic portal vein    Venous stasis ulcer of left lower leg with edema of left lower leg    Chronic acquired lymphedema    Gastritis    Chronic kidney disease, stage 3    Obesity, Class III, BMI 40-49.9 (morbid obesity)    Decreased strength    Impaired functional mobility, balance, gait, and endurance    Medically noncompliant    Type 2 diabetes mellitus with stage 3 chronic kidney  disease, without long-term current use of insulin    Non compliance with medical treatment    Chest pain    Dilation of biliary tract    Bacteremia    Sepsis    Cholecystitis       OBJECTIVE:     Medications:   albumin human 25%  12.5 g Intravenous Once    apixaban  2.5 mg Oral BID    ceFEPime (MAXIPIME) IVPB  2 g Intravenous Q24H    insulin detemir U-100  15 Units Subcutaneous Daily    lipase-protease-amylase  1 capsule Oral TID    metronidazole  500 mg Intravenous Q8H    midodrine  2.5 mg Oral BID WM      lactated ringers 75 mL/hr at 02/29/20 0201    norepinephrine bitartrate-D5W Stopped (02/28/20 0710)     Vitals:    02/29/20 1000   BP: (!) 113/55   Pulse: 65   Resp: 14   Temp:      I/O last 3 completed shifts:  In: 2749.7 [I.V.:1849.7; IV Piggyback:900]  Out: 2392 [Urine:1790; Drains:600; Stool:2]  Physical Exam   Constitutional: He is oriented to person, place, and time. He appears well-developed and well-nourished. No distress.   HENT:   Head: Normocephalic and atraumatic.   Eyes: EOM are normal. No scleral icterus.   Cardiovascular: Normal rate, regular rhythm, normal heart sounds and intact distal pulses. Exam reveals no gallop and no friction rub.   No murmur heard.  Pulmonary/Chest: Effort normal and breath sounds normal. He has no wheezes. He has no rales.   Abdominal: Soft. Bowel sounds are normal. He exhibits no distension. There is no tenderness.   Musculoskeletal: Normal range of motion. He exhibits edema (1+ BLE).   Lymphadenopathy:     He has no cervical adenopathy.   Neurological: He is alert and oriented to person, place, and time.   Skin: Skin is warm and dry. No rash noted. He is not diaphoretic.   Psychiatric: Thought content normal.     Laboratory:  Recent Labs   Lab 02/27/20  0059 02/27/20  0335 02/28/20  0427   WBC 22.90* 23.44* 12.54   HGB 12.6* 12.6* 11.1*   HCT 37.4* 37.4* 32.9*    157 125*   MONO 6.5  1.5* 4.0 5.0     Recent Labs   Lab 02/27/20  0335 02/28/20  0427  02/28/20  1210 02/29/20  0443   * 134*  134* 133* 136  136   K 3.7 3.8  3.8 3.9 3.8  3.8    105  105 104 107  107   CO2 19* 20*  20* 20* 23  23   BUN 44* 55*  55* 55* 58*  58*   CREATININE 3.0* 3.4*  3.4* 3.4* 3.5*  3.5*   CALCIUM 7.8* 7.4*  7.4* 7.8* 7.9*  7.9*   PHOS 2.7 3.9  --  4.1     Labs reviewed  Diagnostic Results:  X-Ray: Reviewed  US: Reviewed Right kidney: 11.2 cm. No hydronephrosis.  Left kidney: 11.4 cm. No hydronephrosis  No hydronephrosis.  Normal bilateral resistive indices.  Echo: Reviewed  · Mildly decreased left ventricular systolic function. The estimated ejection fraction is 45-50%  · Left ventricular diastolic dysfunction.  · Normal right ventricular systolic function.  · Normal central venous pressure (3 mm Hg).    ASSESSMENT/PLAN:   Acute renal failure and CK D stage III- IV- history of multiple AK I, in  summer 2019 creatinine up to 3.5, November 2019 creatinine 1.5 on admission creatinine was 1.7  Chronic from long-standing no-controlled diabetes hemoglobin A1c 12.4  Acute - ATN from septic shock  Creatinine seems to reflect all 3.4 yesterday 3.5 today. Urine output 1.4 L  Electrolytes and volume are acceptable, no indication for emergent dialysis  Continue LR at 75 cc per hour while not tolarating PO, if vomiting stops and able to tolerate clear liquids stop IVF            Thank you for allowing me to participate in the care of your patients  With any question please call [phone]  Suzanne Uriostegui    [facility]  DAYDAY Gonzalez MD, FACP,   MSedrick Askew MD,   MD MAIA De Leon, NP  [address]  [place], [address] [phone]

## 2020-02-29 NOTE — ASSESSMENT & PLAN NOTE
Fatty liver disease, nonalcoholic  Continue Creon  Monitor   S/P choly drain placement  Lipase on 2/25 is 4  2/28 improving clinically

## 2020-02-29 NOTE — PROGRESS NOTES
Neuroendocrine Surgery Progress Note    S:  AKBAR  Complaining of soreness at drain insertion site  No longer on pressor support  Good UOP  Tolerating diet  Afebrile  Cholecystostomy tube with 300cc bilious/purulent drainage    O:  Temp:  [96.2 °F (35.7 °C)-98 °F (36.7 °C)] 98 °F (36.7 °C)  Pulse:  [57-77] 64  Resp:  [1-41] 16  SpO2:  [90 %-100 %] 97 %  BP: ()/(48-76) 97/53  Arterial Line BP: ()/(42-77) 103/42    Output:  UOP: 1453cc  Cholecystostomy: 300cc bilious/purulent drainage  BMx2    Physical Exam:  Gen: NAD, AAOx3  HEENT: EOMI, NCAT  CV: RR  Resp: no shortness of breath, normal WOB  Abd: soft, appropriately ttp, nd, drain site c/d/i  Ext: no cyanosis or edema  MSK: normal range of motion, normal strength  Neuro: alert and oriented    Labs:  pending    Micro:  Drain culture: Ecoli and Providencia rettgeri-susceptibilities pending    A/P:  63 y/o M w/ hx of PMH morbid obesity (BMI 41), stage 1 hepatic fibrosis (biopsy negative for cirrhosis), chronic pancreatitis and extrahepatic portal vein stenosis with  large volume ascites requiring twice weekly paracenteses in addition to pancreatic pseudocyst as well as CAD s/p PCI X 5 and CABG X 3 now with cholecystitis, ELIEZER, CHF s/p cholecystostomy tube    - continue to monitor cholecystostomy tube output; flush tube BID  - f/u drain cultures  - continue cefepime and flagyl   - fu labs  - monitoring renal function  - continue care per primary      Laurita Mclain MD  [employer] General Surgery, PGY-2

## 2020-02-29 NOTE — SUBJECTIVE & OBJECTIVE
Interval History: pain in the site of the drain, but looks more comfortable than prior day.    Review of Systems   Constitutional: Positive for activity change. Negative for chills and fever.   Respiratory: Negative for shortness of breath.    Cardiovascular: Negative for chest pain.   Gastrointestinal: Positive for abdominal pain (at the drain site). Negative for abdominal distention.   Neurological: Negative for light-headedness and headaches.   Psychiatric/Behavioral: Negative for behavioral problems.     Objective:     Vital Signs (Most Recent):  Temp: 96.3 °F (35.7 °C) (02/28/20 1600)  Pulse: 66 (02/28/20 1715)  Resp: 15 (02/28/20 1715)  BP: 121/68 (02/28/20 1700)  SpO2: 99 % (02/28/20 1715) Vital Signs (24h Range):  Temp:  [96.2 °F (35.7 °C)-98.4 °F (36.9 °C)] 96.3 °F (35.7 °C)  Pulse:  [56-77] 66  Resp:  [0-25] 15  SpO2:  [86 %-100 %] 99 %  BP: ()/(46-76) 121/68  Arterial Line BP: ()/(42-77) 138/72     Weight: 115.5 kg (254 lb 10.1 oz)  Body mass index is 39.88 kg/m².    Intake/Output Summary (Last 24 hours) at 2/28/2020 1813  Last data filed at 2/28/2020 1600  Gross per 24 hour   Intake 949.67 ml   Output 1240 ml   Net -290.33 ml      Physical Exam   Constitutional: He is oriented to person, place, and time. He appears well-developed and well-nourished.   HENT:   Head: Normocephalic and atraumatic.   Neck: Normal range of motion. Neck supple.   Cardiovascular: Normal rate, regular rhythm and normal heart sounds. Exam reveals no gallop and no friction rub.   No murmur heard.  Pulmonary/Chest: Effort normal and breath sounds normal. No respiratory distress.   Abdominal: Soft. Bowel sounds are normal. There is no tenderness.   Musculoskeletal: Normal range of motion. He exhibits no edema or tenderness.   + edema   Neurological: He is alert and oriented to person, place, and time.   Skin: Skin is warm.   Psychiatric: He has a normal mood and affect. His speech is normal and behavior is normal.  Thought content normal. His mood appears not anxious. Cognition and memory are normal. He does not exhibit a depressed mood.       Significant Labs:   Bilirubin:   Recent Labs   Lab 02/24/20  0542 02/25/20  0618 02/26/20  0214 02/27/20 0335 02/28/20 0427   BILITOT 0.8 0.6 1.2* 0.6 0.4     Recent Labs   Lab 02/27/20  0059   LACTATE 1.1     Lipase:   No results for input(s): LIPASE in the last 48 hours.  Magnesium:   Recent Labs   Lab 02/27/20  0335 02/28/20  0427 02/28/20  1210   MG 1.9 2.1 2.1     Recent Labs   Lab 02/27/20  0059 02/27/20 0335 02/28/20 0427   WBC 22.90* 23.44* 12.54   HGB 12.6* 12.6* 11.1*   HCT 37.4* 37.4* 32.9*    157 125*     Recent Labs   Lab 02/25/20  0618  02/26/20  1556 02/27/20  0335 02/28/20  0427 02/28/20  1210   *   < > 135* 135* 134*  134* 133*   K 4.7   < > 3.9 3.7 3.8  3.8 3.9      < > 103 105 105  105 104   CO2 19*   < > 22* 19* 20*  20* 20*   BUN 23   < > 36* 44* 55*  55* 55*   CREATININE 1.4   < > 2.5* 3.0* 3.4*  3.4* 3.4*   *   < > 205* 134* 216*  216* 295*   CALCIUM 7.8*   < > 7.8* 7.8* 7.4*  7.4* 7.8*   MG 1.7   < > 1.7 1.9 2.1 2.1   PHOS 2.3*   < > 2.7 2.7 3.9  --    LIPASE 4  --   --   --   --   --     < > = values in this interval not displayed.     Recent Labs   Lab 02/23/20  1238  02/25/20  1826 02/26/20  0214 02/27/20 0335 02/28/20 0427   ALKPHOS 394*   < >  --  276* 232* 197*   *   < >  --  39 29 20   AST 90*   < >  --  38 24 14   ALBUMIN 3.2*   < >  --  2.2* 2.1*  2.1* 1.8*   PROT 6.6   < >  --  5.2* 5.3* 4.7*   BILITOT 0.8   < >  --  1.2* 0.6 0.4   INR 1.0  --  1.0  --   --   --     < > = values in this interval not displayed.      No results for input(s): CPK, CPKMB, MB, TROPONINI in the last 72 hours.  Recent Labs   Lab 02/27/20  1301 02/27/20  1726 02/28/20  0120 02/28/20  0735 02/28/20  1227 02/28/20  1649   POCTGLUCOSE 139* 167* 227* 203* 280* 303*     Hemoglobin A1C   Date Value Ref Range Status   02/23/2020 12.4 (H)  4.0 - 5.6 % Final     Comment:     ADA Screening Guidelines:  5.7-6.4%  Consistent with prediabetes  >or=6.5%  Consistent with diabetes  High levels of fetal hemoglobin interfere with the HbA1C  assay. Heterozygous hemoglobin variants (HbS, HgC, etc)do  not significantly interfere with this assay.   However, presence of multiple variants may affect accuracy.     02/21/2020 12.5 (H) 4.0 - 5.6 % Final     Comment:     ADA Screening Guidelines:  5.7-6.4%  Consistent with prediabetes  >or=6.5%  Consistent with diabetes  High levels of fetal hemoglobin interfere with the HbA1C  assay. Heterozygous hemoglobin variants (HbS, HgC, etc)do  not significantly interfere with this assay.   However, presence of multiple variants may affect accuracy.     11/15/2019 9.5 (H) 4.0 - 5.6 % Final     Comment:     ADA Screening Guidelines:  5.7-6.4%  Consistent with prediabetes  >or=6.5%  Consistent with diabetes  High levels of fetal hemoglobin interfere with the HbA1C  assay. Heterozygous hemoglobin variants (HbS, HgC, etc)do  not significantly interfere with this assay.   However, presence of multiple variants may affect accuracy.       Scheduled Meds:   ceFEPime (MAXIPIME) IVPB  2 g Intravenous Q24H    insulin detemir U-100  15 Units Subcutaneous Daily    lipase-protease-amylase  1 capsule Oral TID    metronidazole  500 mg Intravenous Q8H     Continuous Infusions:   lactated ringers 75 mL/hr at 02/28/20 1221    norepinephrine bitartrate-D5W Stopped (02/28/20 0710)     As Needed: acetaminophen, calcium carbonate, Dextrose 10% Bolus, Dextrose 10% Bolus, Dextrose 10% Bolus, Dextrose 10% Bolus, diphenhydrAMINE, glucagon (human recombinant), glucose, glucose, HYDROcodone-acetaminophen, ibuprofen, insulin aspart U-100, melatonin, ondansetron, ondansetron, pneumoc 13-sugey conj-dip cr(PF), sodium chloride 0.9%       Significant Imaging:  No new imaging

## 2020-02-29 NOTE — ASSESSMENT & PLAN NOTE
Bacteremia  Blood cx with GNR, on Vanc and Zosyn- de-escalate to Zosyn  Repeat blood cx on 2/27 2/28  Routine Gram stain awilda bottle: Gram negative rods P   Blood Culture, Routine Results called to and read back by: Kylah Bragg RN  02/26/2020   P   Blood Culture, Routine 06:34 P   Blood Culture, Routine Gram stain aer bottle: Gram negative rods P   Blood Culture, Routine Positive results previously called 02/26/2020  23:54 P   Blood Culture, Routine ESCHERICHIA COLIAbnormal  P   Resulting Agency [facility]   Susceptibility      Escherichia coli     CULTURE, BLOOD (Preliminary)     Amikacin <=8 mcg/mL Sensitive     Amox/K Clav'ate <=8/4 mcg/mL Sensitive     Amp/Sulbactam 16/8 mcg/mL Intermediate     Ampicillin >16 mcg/mL Resistant     Cefazolin 4 mcg/mL Sensitive     Ceftriaxone <=1 mcg/mL Sensitive     Ciprofloxacin >2 mcg/mL Resistant     Ertapenem <=0.5 mcg/mL Sensitive     Gentamicin >8 mcg/mL Resistant     Levofloxacin >4 mcg/mL Resistant     Meropenem <=1 mcg/mL Sensitive     Piperacillin/Tazo <=8 mcg/mL Sensitive     Tetracycline <=2 mcg/mL Sensitive     Tobramycin >8 mcg/mL Resistant     Trimeth/Sulfa <=0.5/9.5 m... Sensitive

## 2020-02-29 NOTE — ASSESSMENT & PLAN NOTE
C2/27 hronic kidney disease, stage 3  Renally dose medication  Continue IVF   Cr up to 3 from 2 and 2.5, gentle hydration  2/28 cr up yo 3.4 appreciate nephro consult  2/29 appreciate renal inpput, treating and monitoring ATN response  Continue LR at 50 cc /hr

## 2020-02-29 NOTE — ASSESSMENT & PLAN NOTE
2/27  GI had recommended an MRCP, but the pt already had a Cholecystostomy tube placement this mormning,   Continue symptomatic management  Off pressors  2/28 add midodrine for his low BP.  Routine Gram stain awilda bottle: Gram negative rods P   Blood Culture, Routine Results called to and read back by: Kylah Bragg RN  02/26/2020   P   Blood Culture, Routine 06:34 P   Blood Culture, Routine Gram stain aer bottle: Gram negative rods P   Blood Culture, Routine Positive results previously called 02/26/2020  23:54 P   Blood Culture, Routine ESCHERICHIA COLIAbnormal  P   Resulting Agency [facility]   Susceptibility      Escherichia coli     CULTURE, BLOOD (Preliminary)     Amikacin <=8 mcg/mL Sensitive     Amox/K Clav'ate <=8/4 mcg/mL Sensitive     Amp/Sulbactam 16/8 mcg/mL Intermediate     Ampicillin >16 mcg/mL Resistant     Cefazolin 4 mcg/mL Sensitive     Ceftriaxone <=1 mcg/mL Sensitive     Ciprofloxacin >2 mcg/mL Resistant     Ertapenem <=0.5 mcg/mL Sensitive     Gentamicin >8 mcg/mL Resistant     Levofloxacin >4 mcg/mL Resistant     Meropenem <=1 mcg/mL Sensitive     Piperacillin/Tazo <=8 mcg/mL Sensitive     Tetracycline <=2 mcg/mL Sensitive     Tobramycin >8 mcg/mL Resistant     Trimeth/Sulfa <=0.5/9.5 m... Sensitive

## 2020-02-29 NOTE — SUBJECTIVE & OBJECTIVE
Interval History: asking for more food, hope he does not vomit or get nauseous. Not complaining of pain, can be transferred to tele    Review of Systems   Constitutional: Negative for activity change, chills and fever.   Respiratory: Negative for shortness of breath.    Cardiovascular: Negative for chest pain.   Gastrointestinal: Positive for nausea and vomiting. Negative for abdominal distention and abdominal pain (at the drain site).   Neurological: Negative for light-headedness and headaches.   Psychiatric/Behavioral: Negative for behavioral problems.     Objective:     Vital Signs (Most Recent):  Temp: 98.3 °F (36.8 °C) (02/29/20 1530)  Pulse: 72 (02/29/20 1600)  Resp: 17 (02/29/20 1600)  BP: 130/76 (02/29/20 1600)  SpO2: 99 % (02/29/20 1600) Vital Signs (24h Range):  Temp:  [96.8 °F (36 °C)-98.3 °F (36.8 °C)] 98.3 °F (36.8 °C)  Pulse:  [61-77] 72  Resp:  [1-41] 17  SpO2:  [90 %-100 %] 99 %  BP: ()/(48-76) 130/76  Arterial Line BP: ()/(42-72) 135/55     Weight: 115.5 kg (254 lb 10.1 oz)  Body mass index is 39.88 kg/m².    Intake/Output Summary (Last 24 hours) at 2/29/2020 1656  Last data filed at 2/29/2020 1505  Gross per 24 hour   Intake 2481.25 ml   Output 2151 ml   Net 330.25 ml      Physical Exam   Constitutional: He is oriented to person, place, and time. He appears well-developed and well-nourished.   HENT:   Head: Normocephalic and atraumatic.   Neck: Normal range of motion. Neck supple.   Cardiovascular: Normal rate, regular rhythm and normal heart sounds. Exam reveals no gallop and no friction rub.   No murmur heard.  Pulmonary/Chest: Effort normal and breath sounds normal. No respiratory distress.   Abdominal: Soft. Bowel sounds are normal. There is no tenderness.   Musculoskeletal: Normal range of motion. He exhibits no edema or tenderness.   + edema   Neurological: He is alert and oriented to person, place, and time.   Skin: Skin is warm.   Psychiatric: He has a normal mood and affect.  His speech is normal and behavior is normal. Thought content normal. His mood appears not anxious. Cognition and memory are normal. He does not exhibit a depressed mood.       Significant Labs:   Bilirubin:   Recent Labs   Lab 02/25/20  0618 02/26/20  0214 02/27/20  0335 02/28/20  0427 02/29/20  0443   BILITOT 0.6 1.2* 0.6 0.4 0.4     No results for input(s): LACTATE in the last 48 hours.  Lipase:   No results for input(s): LIPASE in the last 48 hours.  Magnesium:   Recent Labs   Lab 02/28/20  0427 02/28/20  1210 02/29/20  0443   MG 2.1 2.1 2.3     Recent Labs   Lab 02/27/20  0059 02/27/20 0335 02/28/20 0427   WBC 22.90* 23.44* 12.54   HGB 12.6* 12.6* 11.1*   HCT 37.4* 37.4* 32.9*    157 125*     Recent Labs   Lab 02/25/20  0618  02/27/20  0335 02/28/20  0427 02/28/20  1210 02/29/20  0443   *   < > 135* 134*  134* 133* 136  136   K 4.7   < > 3.7 3.8  3.8 3.9 3.8  3.8      < > 105 105  105 104 107  107   CO2 19*   < > 19* 20*  20* 20* 23  23   BUN 23   < > 44* 55*  55* 55* 58*  58*   CREATININE 1.4   < > 3.0* 3.4*  3.4* 3.4* 3.5*  3.5*   *   < > 134* 216*  216* 295* 128*  128*   CALCIUM 7.8*   < > 7.8* 7.4*  7.4* 7.8* 7.9*  7.9*   MG 1.7   < > 1.9 2.1 2.1 2.3   PHOS 2.3*   < > 2.7 3.9  --  4.1   LIPASE 4  --   --   --   --   --     < > = values in this interval not displayed.     Recent Labs   Lab 02/23/20  1238  02/25/20  1826  02/27/20 0335 02/28/20  0427 02/29/20  0443   ALKPHOS 394*   < >  --    < > 232* 197* 183*   *   < >  --    < > 29 20 15   AST 90*   < >  --    < > 24 14 11   ALBUMIN 3.2*   < >  --    < > 2.1*  2.1* 1.8* 2.0*   PROT 6.6   < >  --    < > 5.3* 4.7* 5.1*   BILITOT 0.8   < >  --    < > 0.6 0.4 0.4   INR 1.0  --  1.0  --   --   --   --     < > = values in this interval not displayed.      No results for input(s): CPK, CPKMB, MB, TROPONINI in the last 72 hours.  Recent Labs   Lab 02/28/20  0120 02/28/20  0735 02/28/20  1227 02/28/20  1644  02/28/20 2011 02/29/20  1122   POCTGLUCOSE 227* 203* 280* 303* 248* 146*     Hemoglobin A1C   Date Value Ref Range Status   02/23/2020 12.4 (H) 4.0 - 5.6 % Final     Comment:     ADA Screening Guidelines:  5.7-6.4%  Consistent with prediabetes  >or=6.5%  Consistent with diabetes  High levels of fetal hemoglobin interfere with the HbA1C  assay. Heterozygous hemoglobin variants (HbS, HgC, etc)do  not significantly interfere with this assay.   However, presence of multiple variants may affect accuracy.     02/21/2020 12.5 (H) 4.0 - 5.6 % Final     Comment:     ADA Screening Guidelines:  5.7-6.4%  Consistent with prediabetes  >or=6.5%  Consistent with diabetes  High levels of fetal hemoglobin interfere with the HbA1C  assay. Heterozygous hemoglobin variants (HbS, HgC, etc)do  not significantly interfere with this assay.   However, presence of multiple variants may affect accuracy.     11/15/2019 9.5 (H) 4.0 - 5.6 % Final     Comment:     ADA Screening Guidelines:  5.7-6.4%  Consistent with prediabetes  >or=6.5%  Consistent with diabetes  High levels of fetal hemoglobin interfere with the HbA1C  assay. Heterozygous hemoglobin variants (HbS, HgC, etc)do  not significantly interfere with this assay.   However, presence of multiple variants may affect accuracy.       Scheduled Meds:   apixaban  2.5 mg Oral BID    ceFEPime (MAXIPIME) IVPB  2 g Intravenous Q24H    insulin detemir U-100  15 Units Subcutaneous Daily    lipase-protease-amylase  1 capsule Oral TID    metronidazole  500 mg Intravenous Q8H    midodrine  2.5 mg Oral BID WM     Continuous Infusions:   lactated ringers 75 mL/hr at 02/29/20 0201    norepinephrine bitartrate-D5W Stopped (02/28/20 0710)     As Needed: acetaminophen, calcium carbonate, Dextrose 10% Bolus, Dextrose 10% Bolus, Dextrose 10% Bolus, Dextrose 10% Bolus, diphenhydrAMINE, glucagon (human recombinant), glucose, glucose, HYDROcodone-acetaminophen, ibuprofen, insulin aspart U-100,  melatonin, ondansetron, ondansetron, pneumoc 13-sugey conj-dip cr(PF), sodium chloride 0.9%       Significant Imaging:  No new imaging

## 2020-02-29 NOTE — PROGRESS NOTES
[facility] Hospital Medicine  Progress Note    Patient Name: Jian Arrieta  MRN: [id number]  Patient Class: IP- Inpatient   Admission Date: 2/23/2020  Length of Stay: 3 days  Attending Physician: Tien Arboleda DO  Primary Care Provider: Leon Barajas DO        Subjective:     Principal Problem:Sepsis        HPI:  Jian Arrieta is a 65 y/o M with h/o CAD, chronic pancreatitis, HTN, chronic Lymphedema, former smoker, morbid obesity, DM II, CABG in x3 years ago (2017), last stents were placed x2 years ago (2018). present with 2 days history of chest heaviness and pain located in the epigastric area, pain is about 2-3 radiating to the back. Pain is exacerbated with exertion like riding lawnmower. There is an associated right flank pain. He denies chills, fever, nausea, or vomiting.     Overview/Hospital Course:  2/27 pt seen, GI had recommended an MRCP, but the pt already had a Cholecystostomy tube placement this mormning, will hold off on the MRCP for now. Ok to provide 1/2 NS  2/28 pt seen, feeling better today,  at site of drain. sug adjusted his abx. Will place on midodrine low dose to prevent the swings and needs for pressors  2/29  Pt is feeling better, more interacting, better urine output, not complaining of pain, can be transferred to tele floor today    Interval History: asking for more food, hope he does not vomit or get nauseous. Not complaining of pain, can be transferred to tele    Review of Systems   Constitutional: Negative for activity change, chills and fever.   Respiratory: Negative for shortness of breath.    Cardiovascular: Negative for chest pain.   Gastrointestinal: Positive for nausea and vomiting. Negative for abdominal distention and abdominal pain (at the drain site).   Neurological: Negative for light-headedness and headaches.   Psychiatric/Behavioral: Negative for behavioral problems.     Objective:     Vital Signs (Most Recent):  Temp: 98.3 °F (36.8  °C) (02/29/20 1530)  Pulse: 72 (02/29/20 1600)  Resp: 17 (02/29/20 1600)  BP: 130/76 (02/29/20 1600)  SpO2: 99 % (02/29/20 1600) Vital Signs (24h Range):  Temp:  [96.8 °F (36 °C)-98.3 °F (36.8 °C)] 98.3 °F (36.8 °C)  Pulse:  [61-77] 72  Resp:  [1-41] 17  SpO2:  [90 %-100 %] 99 %  BP: ()/(48-76) 130/76  Arterial Line BP: ()/(42-72) 135/55     Weight: 115.5 kg (254 lb 10.1 oz)  Body mass index is 39.88 kg/m².    Intake/Output Summary (Last 24 hours) at 2/29/2020 1656  Last data filed at 2/29/2020 1505  Gross per 24 hour   Intake 2481.25 ml   Output 2151 ml   Net 330.25 ml      Physical Exam   Constitutional: He is oriented to person, place, and time. He appears well-developed and well-nourished.   HENT:   Head: Normocephalic and atraumatic.   Neck: Normal range of motion. Neck supple.   Cardiovascular: Normal rate, regular rhythm and normal heart sounds. Exam reveals no gallop and no friction rub.   No murmur heard.  Pulmonary/Chest: Effort normal and breath sounds normal. No respiratory distress.   Abdominal: Soft. Bowel sounds are normal. There is no tenderness.   Musculoskeletal: Normal range of motion. He exhibits no edema or tenderness.   + edema   Neurological: He is alert and oriented to person, place, and time.   Skin: Skin is warm.   Psychiatric: He has a normal mood and affect. His speech is normal and behavior is normal. Thought content normal. His mood appears not anxious. Cognition and memory are normal. He does not exhibit a depressed mood.       Significant Labs:   Bilirubin:   Recent Labs   Lab 02/25/20  0618 02/26/20  0214 02/27/20  0335 02/28/20  0427 02/29/20  0443   BILITOT 0.6 1.2* 0.6 0.4 0.4     No results for input(s): LACTATE in the last 48 hours.  Lipase:   No results for input(s): LIPASE in the last 48 hours.  Magnesium:   Recent Labs   Lab 02/28/20  0427 02/28/20  1210 02/29/20  0443   MG 2.1 2.1 2.3     Recent Labs   Lab 02/27/20  0059 02/27/20  0335 02/28/20  0427   WBC 22.90*  23.44* 12.54   HGB 12.6* 12.6* 11.1*   HCT 37.4* 37.4* 32.9*    157 125*     Recent Labs   Lab 02/25/20  0618  02/27/20  0335 02/28/20  0427 02/28/20  1210 02/29/20  0443   *   < > 135* 134*  134* 133* 136  136   K 4.7   < > 3.7 3.8  3.8 3.9 3.8  3.8      < > 105 105  105 104 107  107   CO2 19*   < > 19* 20*  20* 20* 23  23   BUN 23   < > 44* 55*  55* 55* 58*  58*   CREATININE 1.4   < > 3.0* 3.4*  3.4* 3.4* 3.5*  3.5*   *   < > 134* 216*  216* 295* 128*  128*   CALCIUM 7.8*   < > 7.8* 7.4*  7.4* 7.8* 7.9*  7.9*   MG 1.7   < > 1.9 2.1 2.1 2.3   PHOS 2.3*   < > 2.7 3.9  --  4.1   LIPASE 4  --   --   --   --   --     < > = values in this interval not displayed.     Recent Labs   Lab 02/23/20  1238  02/25/20  1826  02/27/20  0335 02/28/20  0427 02/29/20  0443   ALKPHOS 394*   < >  --    < > 232* 197* 183*   *   < >  --    < > 29 20 15   AST 90*   < >  --    < > 24 14 11   ALBUMIN 3.2*   < >  --    < > 2.1*  2.1* 1.8* 2.0*   PROT 6.6   < >  --    < > 5.3* 4.7* 5.1*   BILITOT 0.8   < >  --    < > 0.6 0.4 0.4   INR 1.0  --  1.0  --   --   --   --     < > = values in this interval not displayed.      No results for input(s): CPK, CPKMB, MB, TROPONINI in the last 72 hours.  Recent Labs   Lab 02/28/20  0120 02/28/20  0735 02/28/20  1227 02/28/20  1649 02/28/20 2011 02/29/20  1122   POCTGLUCOSE 227* 203* 280* 303* 248* 146*     Hemoglobin A1C   Date Value Ref Range Status   02/23/2020 12.4 (H) 4.0 - 5.6 % Final     Comment:     ADA Screening Guidelines:  5.7-6.4%  Consistent with prediabetes  >or=6.5%  Consistent with diabetes  High levels of fetal hemoglobin interfere with the HbA1C  assay. Heterozygous hemoglobin variants (HbS, HgC, etc)do  not significantly interfere with this assay.   However, presence of multiple variants may affect accuracy.     02/21/2020 12.5 (H) 4.0 - 5.6 % Final     Comment:     ADA Screening Guidelines:  5.7-6.4%  Consistent with  prediabetes  >or=6.5%  Consistent with diabetes  High levels of fetal hemoglobin interfere with the HbA1C  assay. Heterozygous hemoglobin variants (HbS, HgC, etc)do  not significantly interfere with this assay.   However, presence of multiple variants may affect accuracy.     11/15/2019 9.5 (H) 4.0 - 5.6 % Final     Comment:     ADA Screening Guidelines:  5.7-6.4%  Consistent with prediabetes  >or=6.5%  Consistent with diabetes  High levels of fetal hemoglobin interfere with the HbA1C  assay. Heterozygous hemoglobin variants (HbS, HgC, etc)do  not significantly interfere with this assay.   However, presence of multiple variants may affect accuracy.       Scheduled Meds:   apixaban  2.5 mg Oral BID    ceFEPime (MAXIPIME) IVPB  2 g Intravenous Q24H    insulin detemir U-100  15 Units Subcutaneous Daily    lipase-protease-amylase  1 capsule Oral TID    metronidazole  500 mg Intravenous Q8H    midodrine  2.5 mg Oral BID WM     Continuous Infusions:   lactated ringers 75 mL/hr at 02/29/20 0201    norepinephrine bitartrate-D5W Stopped (02/28/20 0710)     As Needed: acetaminophen, calcium carbonate, Dextrose 10% Bolus, Dextrose 10% Bolus, Dextrose 10% Bolus, Dextrose 10% Bolus, diphenhydrAMINE, glucagon (human recombinant), glucose, glucose, HYDROcodone-acetaminophen, ibuprofen, insulin aspart U-100, melatonin, ondansetron, ondansetron, pneumoc 13-sugey conj-dip cr(PF), sodium chloride 0.9%       Significant Imaging:  No new imaging        Assessment/Plan:      * Sepsis  Bacteremia  Blood cx with GNR, on Vanc and Zosyn- de-escalate to Zosyn  Repeat blood cx on 2/27 2/28  Routine Gram stain awilda bottle: Gram negative rods P   Blood Culture, Routine Results called to and read back by: Kylah Bragg RN  02/26/2020   P   Blood Culture, Routine 06:34 P   Blood Culture, Routine Gram stain aer bottle: Gram negative rods P   Blood Culture, Routine Positive results previously called 02/26/2020  23:54 P   Blood Culture,  Routine ESCHERICHIA COLIAbnormal  P   Resulting Agency [facility]   Susceptibility      Escherichia coli     CULTURE, BLOOD (Preliminary)     Amikacin <=8 mcg/mL Sensitive     Amox/K Clav'ate <=8/4 mcg/mL Sensitive     Amp/Sulbactam 16/8 mcg/mL Intermediate     Ampicillin >16 mcg/mL Resistant     Cefazolin 4 mcg/mL Sensitive     Ceftriaxone <=1 mcg/mL Sensitive     Ciprofloxacin >2 mcg/mL Resistant     Ertapenem <=0.5 mcg/mL Sensitive     Gentamicin >8 mcg/mL Resistant     Levofloxacin >4 mcg/mL Resistant     Meropenem <=1 mcg/mL Sensitive     Piperacillin/Tazo <=8 mcg/mL Sensitive     Tetracycline <=2 mcg/mL Sensitive     Tobramycin >8 mcg/mL Resistant     Trimeth/Sulfa <=0.5/9.5 m... Sensitive                    Cholecystitis  2/27  GI had recommended an MRCP, but the pt already had a Cholecystostomy tube placement this mormning,   Continue symptomatic management  Off pressors  2/28 add midodrine for his low BP.  Routine Gram stain awilda bottle: Gram negative rods P   Blood Culture, Routine Results called to and read back by: Kylah Bragg RN  02/26/2020   P   Blood Culture, Routine 06:34 P   Blood Culture, Routine Gram stain aer bottle: Gram negative rods P   Blood Culture, Routine Positive results previously called 02/26/2020  23:54 P   Blood Culture, Routine ESCHERICHIA COLIAbnormal  P   Resulting Agency [facility]   Susceptibility      Escherichia coli     CULTURE, BLOOD (Preliminary)     Amikacin <=8 mcg/mL Sensitive     Amox/K Clav'ate <=8/4 mcg/mL Sensitive     Amp/Sulbactam 16/8 mcg/mL Intermediate     Ampicillin >16 mcg/mL Resistant     Cefazolin 4 mcg/mL Sensitive     Ceftriaxone <=1 mcg/mL Sensitive     Ciprofloxacin >2 mcg/mL Resistant     Ertapenem <=0.5 mcg/mL Sensitive     Gentamicin >8 mcg/mL Resistant     Levofloxacin >4 mcg/mL Resistant     Meropenem <=1 mcg/mL Sensitive     Piperacillin/Tazo <=8 mcg/mL Sensitive     Tetracycline <=2 mcg/mL Sensitive     Tobramycin >8 mcg/mL Resistant     Trimeth/Sulfa  <=0.5/9.5 m... Sensitive                2/29 pt is being treated for ecoli and prvencia.  Ok to transfer to [facility], off pressors      Chronic pancreatitis  Fatty liver disease, nonalcoholic  Continue Creon  Monitor   S/P choly drain placement  Lipase on 2/25 is 4  2/28 improving clinically    Bacteremia    Continue zosyn iv  Routine Gram stain awilda bottle: Gram negative rods P   Blood Culture, Routine Results called to and read back by: Kylah Bragg RN  02/26/2020   P   Blood Culture, Routine 06:34 P   Blood Culture, Routine Gram stain aer bottle: Gram negative rods P   Blood Culture, Routine Positive results previously called 02/26/2020  23:54 P   Blood Culture, Routine ESCHERICHIA COLIAbnormal  P   Resulting Agency [facility]   Susceptibility      Escherichia coli     CULTURE, BLOOD (Preliminary)     Amikacin <=8 mcg/mL Sensitive     Amox/K Clav'ate <=8/4 mcg/mL Sensitive     Amp/Sulbactam 16/8 mcg/mL Intermediate     Ampicillin >16 mcg/mL Resistant     Cefazolin 4 mcg/mL Sensitive     Ceftriaxone <=1 mcg/mL Sensitive     Ciprofloxacin >2 mcg/mL Resistant     Ertapenem <=0.5 mcg/mL Sensitive     Gentamicin >8 mcg/mL Resistant     Levofloxacin >4 mcg/mL Resistant     Meropenem <=1 mcg/mL Sensitive     Piperacillin/Tazo <=8 mcg/mL Sensitive     Tetracycline <=2 mcg/mL Sensitive     Tobramycin >8 mcg/mL Resistant     Trimeth/Sulfa <=0.5/9.5 m... Sensitive                2/29 continue current double abx  afebrile    Chronic acquired lymphedema  chronic      ELIEZER (acute kidney injury)  C2/27 hronic kidney disease, stage 3  Renally dose medication  Continue IVF   Cr up to 3 from 2 and 2.5, gentle hydration  2/28 cr up yo 3.4 appreciate nephro consult  2/29 appreciate renal inpput, treating and monitoring ATN response  Continue LR at 50 cc /hr    Type 2 diabetes mellitus with diabetic neuropathy, with long-term current use of insulin  On   levemir  Low dose SSI  accucheck  Diabetic diet      Chronic diastolic heart  failure  2/28 midodrine to prevent use of fluid in chf pt  2/29 monitor while pt is on IVF for renal ATN    Coronary artery disease due to calcified coronary lesion  2/28 restart Eliquis        VTE Risk Mitigation (From admission, onward)         Ordered     apixaban tablet 2.5 mg  2 times daily      02/28/20 1820     Place sequential compression device  Until discontinued      02/23/20 1704                Critical care time spent on the evaluation and treatment of severe organ dysfunction, review of pertinent labs and imaging studies, discussions with consulting providers and discussions with patient/family: 43 minutes.      Tien Arboleda DO  Department of Hospital Medicine   [facility]

## 2020-02-29 NOTE — ASSESSMENT & PLAN NOTE
C2/27 hronic kidney disease, stage 3  Renally dose medication  Continue IVF   Cr up to 3 from 2 and 2.5, gentle hydration  2/28 cr up yo 3.4 appreciate nephro consult

## 2020-02-29 NOTE — PROGRESS NOTES
[facility] Hospital Medicine  Progress Note    Patient Name: Jian Arrieta  MRN: [id number]  Patient Class: IP- Inpatient   Admission Date: 2/23/2020  Length of Stay: 2 days  Attending Physician: Tien Arboleda DO  Primary Care Provider: Leon Barajas DO        Subjective:     Principal Problem:Sepsis        HPI:  Jian Arrieta is a 63 y/o M with h/o CAD, chronic pancreatitis, HTN, chronic Lymphedema, former smoker, morbid obesity, DM II, CABG in x3 years ago (2017), last stents were placed x2 years ago (2018). present with 2 days history of chest heaviness and pain located in the epigastric area, pain is about 2-3 radiating to the back. Pain is exacerbated with exertion like riding lawnmower. There is an associated right flank pain. He denies chills, fever, nausea, or vomiting.     Overview/Hospital Course:  2/27 pt seen, GI had recommended an MRCP, but the pt already had a Cholecystostomy tube placement this mormning, will hold off on the MRCP for now. Ok to provide 1/2 NS  2/28 pt seen, feeling better today,  at site of drain. sug adjusted his abx. Will place on midodrine low dose to prevent the swings and needs for pressors    Interval History: pain in the site of the drain, but looks more comfortable than prior day.    Review of Systems   Constitutional: Positive for activity change. Negative for chills and fever.   Respiratory: Negative for shortness of breath.    Cardiovascular: Negative for chest pain.   Gastrointestinal: Positive for abdominal pain (at the drain site). Negative for abdominal distention.   Neurological: Negative for light-headedness and headaches.   Psychiatric/Behavioral: Negative for behavioral problems.     Objective:     Vital Signs (Most Recent):  Temp: 96.3 °F (35.7 °C) (02/28/20 1600)  Pulse: 66 (02/28/20 1715)  Resp: 15 (02/28/20 1715)  BP: 121/68 (02/28/20 1700)  SpO2: 99 % (02/28/20 1715) Vital Signs (24h Range):  Temp:  [96.2 °F (35.7 °C)-98.4  °F (36.9 °C)] 96.3 °F (35.7 °C)  Pulse:  [56-77] 66  Resp:  [0-25] 15  SpO2:  [86 %-100 %] 99 %  BP: ()/(46-76) 121/68  Arterial Line BP: ()/(42-77) 138/72     Weight: 115.5 kg (254 lb 10.1 oz)  Body mass index is 39.88 kg/m².    Intake/Output Summary (Last 24 hours) at 2/28/2020 1813  Last data filed at 2/28/2020 1600  Gross per 24 hour   Intake 949.67 ml   Output 1240 ml   Net -290.33 ml      Physical Exam   Constitutional: He is oriented to person, place, and time. He appears well-developed and well-nourished.   HENT:   Head: Normocephalic and atraumatic.   Neck: Normal range of motion. Neck supple.   Cardiovascular: Normal rate, regular rhythm and normal heart sounds. Exam reveals no gallop and no friction rub.   No murmur heard.  Pulmonary/Chest: Effort normal and breath sounds normal. No respiratory distress.   Abdominal: Soft. Bowel sounds are normal. There is no tenderness.   Musculoskeletal: Normal range of motion. He exhibits no edema or tenderness.   + edema   Neurological: He is alert and oriented to person, place, and time.   Skin: Skin is warm.   Psychiatric: He has a normal mood and affect. His speech is normal and behavior is normal. Thought content normal. His mood appears not anxious. Cognition and memory are normal. He does not exhibit a depressed mood.       Significant Labs:   Bilirubin:   Recent Labs   Lab 02/24/20  0542 02/25/20  0618 02/26/20  0214 02/27/20  0335 02/28/20  0427   BILITOT 0.8 0.6 1.2* 0.6 0.4     Recent Labs   Lab 02/27/20  0059   LACTATE 1.1     Lipase:   No results for input(s): LIPASE in the last 48 hours.  Magnesium:   Recent Labs   Lab 02/27/20  0335 02/28/20  0427 02/28/20  1210   MG 1.9 2.1 2.1     Recent Labs   Lab 02/27/20  0059 02/27/20  0335 02/28/20  0427   WBC 22.90* 23.44* 12.54   HGB 12.6* 12.6* 11.1*   HCT 37.4* 37.4* 32.9*    157 125*     Recent Labs   Lab 02/25/20  0618  02/26/20  1556 02/27/20  0335 02/28/20  0427 02/28/20  1210   *    < > 135* 135* 134*  134* 133*   K 4.7   < > 3.9 3.7 3.8  3.8 3.9      < > 103 105 105  105 104   CO2 19*   < > 22* 19* 20*  20* 20*   BUN 23   < > 36* 44* 55*  55* 55*   CREATININE 1.4   < > 2.5* 3.0* 3.4*  3.4* 3.4*   *   < > 205* 134* 216*  216* 295*   CALCIUM 7.8*   < > 7.8* 7.8* 7.4*  7.4* 7.8*   MG 1.7   < > 1.7 1.9 2.1 2.1   PHOS 2.3*   < > 2.7 2.7 3.9  --    LIPASE 4  --   --   --   --   --     < > = values in this interval not displayed.     Recent Labs   Lab 02/23/20  1238  02/25/20  1826 02/26/20  0214 02/27/20  0335 02/28/20  0427   ALKPHOS 394*   < >  --  276* 232* 197*   *   < >  --  39 29 20   AST 90*   < >  --  38 24 14   ALBUMIN 3.2*   < >  --  2.2* 2.1*  2.1* 1.8*   PROT 6.6   < >  --  5.2* 5.3* 4.7*   BILITOT 0.8   < >  --  1.2* 0.6 0.4   INR 1.0  --  1.0  --   --   --     < > = values in this interval not displayed.      No results for input(s): CPK, CPKMB, MB, TROPONINI in the last 72 hours.  Recent Labs   Lab 02/27/20  1301 02/27/20  1726 02/28/20  0120 02/28/20  0735 02/28/20  1227 02/28/20  1649   POCTGLUCOSE 139* 167* 227* 203* 280* 303*     Hemoglobin A1C   Date Value Ref Range Status   02/23/2020 12.4 (H) 4.0 - 5.6 % Final     Comment:     ADA Screening Guidelines:  5.7-6.4%  Consistent with prediabetes  >or=6.5%  Consistent with diabetes  High levels of fetal hemoglobin interfere with the HbA1C  assay. Heterozygous hemoglobin variants (HbS, HgC, etc)do  not significantly interfere with this assay.   However, presence of multiple variants may affect accuracy.     02/21/2020 12.5 (H) 4.0 - 5.6 % Final     Comment:     ADA Screening Guidelines:  5.7-6.4%  Consistent with prediabetes  >or=6.5%  Consistent with diabetes  High levels of fetal hemoglobin interfere with the HbA1C  assay. Heterozygous hemoglobin variants (HbS, HgC, etc)do  not significantly interfere with this assay.   However, presence of multiple variants may affect accuracy.     11/15/2019 9.5 (H)  4.0 - 5.6 % Final     Comment:     ADA Screening Guidelines:  5.7-6.4%  Consistent with prediabetes  >or=6.5%  Consistent with diabetes  High levels of fetal hemoglobin interfere with the HbA1C  assay. Heterozygous hemoglobin variants (HbS, HgC, etc)do  not significantly interfere with this assay.   However, presence of multiple variants may affect accuracy.       Scheduled Meds:   ceFEPime (MAXIPIME) IVPB  2 g Intravenous Q24H    insulin detemir U-100  15 Units Subcutaneous Daily    lipase-protease-amylase  1 capsule Oral TID    metronidazole  500 mg Intravenous Q8H     Continuous Infusions:   lactated ringers 75 mL/hr at 02/28/20 1221    norepinephrine bitartrate-D5W Stopped (02/28/20 0710)     As Needed: acetaminophen, calcium carbonate, Dextrose 10% Bolus, Dextrose 10% Bolus, Dextrose 10% Bolus, Dextrose 10% Bolus, diphenhydrAMINE, glucagon (human recombinant), glucose, glucose, HYDROcodone-acetaminophen, ibuprofen, insulin aspart U-100, melatonin, ondansetron, ondansetron, pneumoc 13-sugey conj-dip cr(PF), sodium chloride 0.9%       Significant Imaging:  No new imaging        Assessment/Plan:      * Sepsis  Bacteremia  Blood cx with GNR, on Vanc and Zosyn- de-escalate to Zosyn  Repeat blood cx on 2/27 2/28  Routine Gram stain awilda bottle: Gram negative rods P   Blood Culture, Routine Results called to and read back by: Kylah Bragg RN  02/26/2020   P   Blood Culture, Routine 06:34 P   Blood Culture, Routine Gram stain aer bottle: Gram negative rods P   Blood Culture, Routine Positive results previously called 02/26/2020  23:54 P   Blood Culture, Routine ESCHERICHIA COLIAbnormal  P   Resulting Agency [facility]   Susceptibility      Escherichia coli     CULTURE, BLOOD (Preliminary)     Amikacin <=8 mcg/mL Sensitive     Amox/K Clav'ate <=8/4 mcg/mL Sensitive     Amp/Sulbactam 16/8 mcg/mL Intermediate     Ampicillin >16 mcg/mL Resistant     Cefazolin 4 mcg/mL Sensitive     Ceftriaxone <=1 mcg/mL Sensitive      Ciprofloxacin >2 mcg/mL Resistant     Ertapenem <=0.5 mcg/mL Sensitive     Gentamicin >8 mcg/mL Resistant     Levofloxacin >4 mcg/mL Resistant     Meropenem <=1 mcg/mL Sensitive     Piperacillin/Tazo <=8 mcg/mL Sensitive     Tetracycline <=2 mcg/mL Sensitive     Tobramycin >8 mcg/mL Resistant     Trimeth/Sulfa <=0.5/9.5 m... Sensitive                    Cholecystitis  2/27  GI had recommended an MRCP, but the pt already had a Cholecystostomy tube placement this mormning,   Continue symptomatic management  Off pressors  2/28 add midodrine for his low BP.  Routine Gram stain awilda bottle: Gram negative rods P   Blood Culture, Routine Results called to and read back by: Kylah Bragg RN  02/26/2020   P   Blood Culture, Routine 06:34 P   Blood Culture, Routine Gram stain aer bottle: Gram negative rods P   Blood Culture, Routine Positive results previously called 02/26/2020  23:54 P   Blood Culture, Routine ESCHERICHIA COLIAbnormal  P   Resulting Agency [facility]   Susceptibility      Escherichia coli     CULTURE, BLOOD (Preliminary)     Amikacin <=8 mcg/mL Sensitive     Amox/K Clav'ate <=8/4 mcg/mL Sensitive     Amp/Sulbactam 16/8 mcg/mL Intermediate     Ampicillin >16 mcg/mL Resistant     Cefazolin 4 mcg/mL Sensitive     Ceftriaxone <=1 mcg/mL Sensitive     Ciprofloxacin >2 mcg/mL Resistant     Ertapenem <=0.5 mcg/mL Sensitive     Gentamicin >8 mcg/mL Resistant     Levofloxacin >4 mcg/mL Resistant     Meropenem <=1 mcg/mL Sensitive     Piperacillin/Tazo <=8 mcg/mL Sensitive     Tetracycline <=2 mcg/mL Sensitive     Tobramycin >8 mcg/mL Resistant     Trimeth/Sulfa <=0.5/9.5 m... Sensitive                    Chronic pancreatitis  Fatty liver disease, nonalcoholic  Continue Creon  Monitor   S/P choly drain placement  Lipase on 2/25 is 4  2/28 improving clinically    Bacteremia    Continue zosyn iv  Routine Gram stain awilda bottle: Gram negative rods P   Blood Culture, Routine Results called to and read back by: Kylah Bragg  RN  02/26/2020   P   Blood Culture, Routine 06:34 P   Blood Culture, Routine Gram stain aer bottle: Gram negative rods P   Blood Culture, Routine Positive results previously called 02/26/2020  23:54 P   Blood Culture, Routine ESCHERICHIA COLIAbnormal  P   Resulting Agency [facility]   Susceptibility      Escherichia coli     CULTURE, BLOOD (Preliminary)     Amikacin <=8 mcg/mL Sensitive     Amox/K Clav'ate <=8/4 mcg/mL Sensitive     Amp/Sulbactam 16/8 mcg/mL Intermediate     Ampicillin >16 mcg/mL Resistant     Cefazolin 4 mcg/mL Sensitive     Ceftriaxone <=1 mcg/mL Sensitive     Ciprofloxacin >2 mcg/mL Resistant     Ertapenem <=0.5 mcg/mL Sensitive     Gentamicin >8 mcg/mL Resistant     Levofloxacin >4 mcg/mL Resistant     Meropenem <=1 mcg/mL Sensitive     Piperacillin/Tazo <=8 mcg/mL Sensitive     Tetracycline <=2 mcg/mL Sensitive     Tobramycin >8 mcg/mL Resistant     Trimeth/Sulfa <=0.5/9.5 m... Sensitive                    Dilation of biliary tract    2/28t the pt already had a Cholecystostomy tube placed    Chest pain  Coronary artery disease due to calcified coronary lesion  Trend troponin    Chronic acquired lymphedema  chronic      ELIEZER (acute kidney injury)  C2/27 hronic kidney disease, stage 3  Renally dose medication  Continue IVF   Cr up to 3 from 2 and 2.5, gentle hydration  2/28 cr up yo 3.4 appreciate nephro consult    Type 2 diabetes mellitus with diabetic neuropathy, with long-term current use of insulin  On   levemir  Low dose SSI  accucheck  Diabetic diet      Chronic diastolic heart failure  2/28 midodrine to prevent use of fluid in chf pt      Coronary artery disease due to calcified coronary lesion  2/28 restart Eliquis        VTE Risk Mitigation (From admission, onward)         Ordered     Place sequential compression device  Until discontinued      02/23/20 0805                Critical care time spent on the evaluation and treatment of severe organ dysfunction, review of pertinent labs and imaging  studies, discussions with consulting providers and discussions with patient/family: 37 minutes.      Tien Arboleda DO  Department of Hospital Medicine   [facility]

## 2020-03-01 PROBLEM — K86.1 CHRONIC PANCREATITIS: Chronic | Status: RESOLVED | Noted: 2019-01-28 | Resolved: 2020-03-01

## 2020-03-01 LAB
ALBUMIN SERPL BCP-MCNC: 1.9 G/DL (ref 3.5–5.2)
ALP SERPL-CCNC: 202 U/L (ref 55–135)
ALT SERPL W/O P-5'-P-CCNC: 13 U/L (ref 10–44)
ANION GAP SERPL CALC-SCNC: 7 MMOL/L (ref 8–16)
AST SERPL-CCNC: 13 U/L (ref 10–40)
BILIRUB SERPL-MCNC: 0.3 MG/DL (ref 0.1–1)
BUN SERPL-MCNC: 51 MG/DL (ref 8–23)
CALCIUM SERPL-MCNC: 7.8 MG/DL (ref 8.7–10.5)
CHLORIDE SERPL-SCNC: 106 MMOL/L (ref 95–110)
CO2 SERPL-SCNC: 23 MMOL/L (ref 23–29)
CREAT SERPL-MCNC: 3.5 MG/DL (ref 0.5–1.4)
EST. GFR  (AFRICAN AMERICAN): 20 ML/MIN/1.73 M^2
EST. GFR  (NON AFRICAN AMERICAN): 17 ML/MIN/1.73 M^2
GLUCOSE SERPL-MCNC: 137 MG/DL (ref 70–110)
PHOSPHATE SERPL-MCNC: 4.3 MG/DL (ref 2.7–4.5)
POCT GLUCOSE: 133 MG/DL (ref 70–110)
POCT GLUCOSE: 188 MG/DL (ref 70–110)
POCT GLUCOSE: 254 MG/DL (ref 70–110)
POCT GLUCOSE: 298 MG/DL (ref 70–110)
POCT GLUCOSE: 359 MG/DL (ref 70–110)
POTASSIUM SERPL-SCNC: 3.7 MMOL/L (ref 3.5–5.1)
PROT SERPL-MCNC: 4.8 G/DL (ref 6–8.4)
SODIUM SERPL-SCNC: 136 MMOL/L (ref 136–145)

## 2020-03-01 PROCEDURE — 25000003 PHARM REV CODE 250: Performed by: HOSPITALIST

## 2020-03-01 PROCEDURE — 97116 GAIT TRAINING THERAPY: CPT | Mod: CQ

## 2020-03-01 PROCEDURE — 11000001 HC ACUTE MED/SURG PRIVATE ROOM

## 2020-03-01 PROCEDURE — 84100 ASSAY OF PHOSPHORUS: CPT

## 2020-03-01 PROCEDURE — S0030 INJECTION, METRONIDAZOLE: HCPCS | Performed by: STUDENT IN AN ORGANIZED HEALTH CARE EDUCATION/TRAINING PROGRAM

## 2020-03-01 PROCEDURE — 25000003 PHARM REV CODE 250: Performed by: FAMILY MEDICINE

## 2020-03-01 PROCEDURE — 80053 COMPREHEN METABOLIC PANEL: CPT

## 2020-03-01 PROCEDURE — 97530 THERAPEUTIC ACTIVITIES: CPT | Mod: CQ

## 2020-03-01 PROCEDURE — 25000003 PHARM REV CODE 250: Performed by: STUDENT IN AN ORGANIZED HEALTH CARE EDUCATION/TRAINING PROGRAM

## 2020-03-01 PROCEDURE — 63600175 PHARM REV CODE 636 W HCPCS: Performed by: STUDENT IN AN ORGANIZED HEALTH CARE EDUCATION/TRAINING PROGRAM

## 2020-03-01 PROCEDURE — 94761 N-INVAS EAR/PLS OXIMETRY MLT: CPT

## 2020-03-01 RX ADMIN — CEFEPIME 2 G: 2 INJECTION, POWDER, FOR SOLUTION INTRAVENOUS at 01:03

## 2020-03-01 RX ADMIN — INSULIN ASPART 6 UNITS: 100 INJECTION, SOLUTION INTRAVENOUS; SUBCUTANEOUS at 06:03

## 2020-03-01 RX ADMIN — METRONIDAZOLE 500 MG: 500 INJECTION, SOLUTION INTRAVENOUS at 04:03

## 2020-03-01 RX ADMIN — MIDODRINE HYDROCHLORIDE 2.5 MG: 2.5 TABLET ORAL at 05:03

## 2020-03-01 RX ADMIN — METRONIDAZOLE 500 MG: 500 INJECTION, SOLUTION INTRAVENOUS at 08:03

## 2020-03-01 RX ADMIN — INSULIN ASPART 5 UNITS: 100 INJECTION, SOLUTION INTRAVENOUS; SUBCUTANEOUS at 08:03

## 2020-03-01 RX ADMIN — INSULIN DETEMIR 15 UNITS: 100 INJECTION, SOLUTION SUBCUTANEOUS at 08:03

## 2020-03-01 RX ADMIN — MIDODRINE HYDROCHLORIDE 2.5 MG: 2.5 TABLET ORAL at 08:03

## 2020-03-01 RX ADMIN — PANCRELIPASE 1 CAPSULE: 36000; 180000; 114000 CAPSULE, DELAYED RELEASE PELLETS ORAL at 08:03

## 2020-03-01 RX ADMIN — PANCRELIPASE 1 CAPSULE: 36000; 180000; 114000 CAPSULE, DELAYED RELEASE PELLETS ORAL at 04:03

## 2020-03-01 RX ADMIN — APIXABAN 2.5 MG: 2.5 TABLET, FILM COATED ORAL at 08:03

## 2020-03-01 RX ADMIN — IBUPROFEN 400 MG: 400 TABLET, FILM COATED ORAL at 01:03

## 2020-03-01 RX ADMIN — METRONIDAZOLE 500 MG: 500 INJECTION, SOLUTION INTRAVENOUS at 01:03

## 2020-03-01 NOTE — PLAN OF CARE
VN Rounds. VN called into patient's room for rounding and turned camera with permission. Patient resting in bed. He was very sleepy and did not want to talk much. VN instructed to call for assistance. Call light within reach. Patient verbalized understanding. No acute distress noted. Pain denies pain at present. Allowed time for questions. Will continue to monitor chart and be available and intervene as needed.

## 2020-03-01 NOTE — PLAN OF CARE
Patient AAOX4. Plan of care reviewed and patient verbalized understanding. O2 sats are at 98% on room air. LR going at 75cc/hr. PRN pain meds given. Bed in lowest position, call light within reach. Fall precautions maintained. Will continue to monitor.

## 2020-03-01 NOTE — ASSESSMENT & PLAN NOTE
2/27  GI had recommended an MRCP, but the pt already had a Cholecystostomy tube placement this mormning,   Continue symptomatic management  Off pressors  2/28 add midodrine for his low BP.  Routine Gram stain awilda bottle: Gram negative rods P   Blood Culture, Routine Results called to and read back by: Kylah Bragg RN  02/26/2020   P   Blood Culture, Routine 06:34 P   Blood Culture, Routine Gram stain aer bottle: Gram negative rods P   Blood Culture, Routine Positive results previously called 02/26/2020  23:54 P   Blood Culture, Routine ESCHERICHIA COLIAbnormal  P   Resulting Agency [facility]   Susceptibility      Escherichia coli     CULTURE, BLOOD (Preliminary)     Amikacin <=8 mcg/mL Sensitive     Amox/K Clav'ate <=8/4 mcg/mL Sensitive     Amp/Sulbactam 16/8 mcg/mL Intermediate     Ampicillin >16 mcg/mL Resistant     Cefazolin 4 mcg/mL Sensitive     Ceftriaxone <=1 mcg/mL Sensitive     Ciprofloxacin >2 mcg/mL Resistant     Ertapenem <=0.5 mcg/mL Sensitive     Gentamicin >8 mcg/mL Resistant     Levofloxacin >4 mcg/mL Resistant     Meropenem <=1 mcg/mL Sensitive     Piperacillin/Tazo <=8 mcg/mL Sensitive     Tetracycline <=2 mcg/mL Sensitive     Tobramycin >8 mcg/mL Resistant     Trimeth/Sulfa <=0.5/9.5 m... Sensitive                2/29 pt is being treated for ecoli and prvencia.  Ok to transfer to [facility], off pressors  3/1 comfortable, continue current POC

## 2020-03-01 NOTE — PT/OT/SLP PROGRESS
Physical Therapy Treatment    Patient Name:  Jian Arrieta   MRN:  [id number]    Recommendations:     Discharge Recommendations:  home health PT, home health OT   Discharge Equipment Recommendations: none   Barriers to discharge: None    Assessment:     Jian Arrieta is a 64 y.o. male admitted with a medical diagnosis of Sepsis.  He presents with the following impairments/functional limitations:  weakness, impaired endurance, impaired self care skills, impaired functional mobilty, gait instability, impaired balance, decreased coordination, decreased lower extremity function, decreased ROM, impaired skin, edema.  Pt would continue to benefit from P.T. To address impairments listed above, and to assist pt's return to PLOF.      Rehab Prognosis: Fair; patient would benefit from acute skilled PT services to address these deficits and reach maximum level of function.    Recent Surgery: Procedure(s) (LRB):  ULTRASOUND, UPPER GI TRACT, ENDOSCOPIC (N/A) 4 Days Post-Op    Plan:     During this hospitalization, patient to be seen 5 x/week to address the identified rehab impairments via gait training, therapeutic activities, therapeutic exercises, neuromuscular re-education and progress toward the following goals:    · Plan of Care Expires:  03/28/20    Subjective     Patient/Family Comments/goals: Pt agreed to tx.  Pain/Comfort:  · Pain Rating 1: 0/10  · Pain Rating Post-Intervention 1: 0/10      Objective:     Communicated with RN (Ann) prior to session.  Patient found up in chair with mathew catheter, telemetry(drain) upon PT entry to room.     General Precautions: Standard, fall   Orthopedic Precautions:N/A   Braces:       Functional Mobility:  · Bed Mobility:     · Scooting: modified independence  · Sit to Supine: stand by assistance  · Transfers:     · Sit to Stand:  supervision and stand by assistance with no AD and rolling walker  · Bed to Chair: contact guard assistance with  no AD  using  Step Transfer  · Gait:  90ft with RW and CGA/SBA, and 90 without A.D. with CGA with a standing rest break between bouts secondary to decreased strength and endurance.  Pt ambulates with a WBOS, increased w/s, increased stance time on LLE, and decreased foot to floor clearance  on L.  Vc's to slow miracle some for increased stability and safety.  No LOB  · Balance: sitting good, standing fair+/good-, gait fair/fair+      AM-PAC 6 CLICK MOBILITY  Turning over in bed (including adjusting bedclothes, sheets and blankets)?: 3  Sitting down on and standing up from a chair with arms (e.g., wheelchair, bedside commode, etc.): 3  Moving from lying on back to sitting on the side of the bed?: 3  Moving to and from a bed to a chair (including a wheelchair)?: 3  Need to walk in hospital room?: 3  Climbing 3-5 steps with a railing?: 2  Basic Mobility Total Score: 17       Therapeutic Activities and Exercises:   Seated BLE therex AP, LAQ, hip flexion x 12 reps.  Transfer b/s chair to EOB with CGA and assist with catheter management during tx.    Patient left supine with all lines intact, call button in reach, bed alarm on and RN notified..    GOALS:   Multidisciplinary Problems     Physical Therapy Goals        Problem: Physical Therapy Goal    Goal Priority Disciplines Outcome Goal Variances Interventions   Physical Therapy Goal     PT, PT/OT Ongoing, Progressing     Description:  Goals to be met by: 3/28/2020     Patient will increase functional independence with mobility by performin. Supine to sit with Modified [place]  2. Sit to stand transfer with Modified [place]  3. Bed to chair transfer with Modified [place] using appropriate AD  4. Gait  x 100 feet with Modified [place] using appropriate AD.   5. Lower extremity exercise program x15 reps per handout, with supervision                      Time Tracking:     PT Received On: 20  PT Start Time: 1531     PT Stop Time: 1556  PT Total Time (min): 25 min     Billable  Minutes: Gait Training 14 and Therapeutic Activity 11                   Sandra Martinez, RICHY  03/01/2020

## 2020-03-01 NOTE — NURSING
Pt transferred to [facility] with personal belongings, nurse Simmons at bedside to receive

## 2020-03-01 NOTE — PROGRESS NOTES
Progress Note  Nephrology      Consult Requested By: Tien Arboleda DO  Reason for Consult: ARF    SUBJECTIVE:     Review of Systems   Constitutional: Negative for chills and fever.   Respiratory: Negative for cough and shortness of breath.    Cardiovascular: Negative for chest pain and leg swelling.   Gastrointestinal: Negative for abdominal pain, nausea and vomiting.     Patient Active Problem List   Diagnosis    Coronary artery disease due to calcified coronary lesion    Type 2 diabetes mellitus, with long-term current use of insulin    Sleep apnea    Status post bariatric surgery    Anasarca    Atherosclerosis    Chronic pancreatitis    Pseudocyst of pancreas    Chronic diastolic heart failure    Lymphedema of both lower extremities    Type 2 diabetes mellitus with diabetic neuropathy, with long-term current use of insulin    Hypoalbuminemia    Myocardiopathy    Other ascites    ELIEZER (acute kidney injury)    Anemia    NSTEMI (non-ST elevated myocardial infarction)    Paroxysmal atrial fibrillation    Hypertension associated with diabetes    Hyperlipidemia associated with type 2 diabetes mellitus    Obesity hypoventilation syndrome    Symptomatic anemia    Alteration in skin integrity    Venous stasis dermatitis of both lower extremities    Acute renal failure with specified lesion    S/P abdominal paracentesis    Decreased mobility    Discharge planning issues    Malnutrition of moderate degree    Hepatic fibrosis    Portal hypertension due to obstruction of extrahepatic portal vein    Venous stasis ulcer of left lower leg with edema of left lower leg    Chronic acquired lymphedema    Gastritis    Chronic kidney disease, stage 3    Obesity, Class III, BMI 40-49.9 (morbid obesity)    Decreased strength    Impaired functional mobility, balance, gait, and endurance    Medically noncompliant    Type 2 diabetes mellitus with stage 3 chronic kidney disease, without long-term  current use of insulin    Non compliance with medical treatment    Bacteremia    Sepsis    Cholecystitis       OBJECTIVE:     Medications:   apixaban  2.5 mg Oral BID    ceFEPime (MAXIPIME) IVPB  2 g Intravenous Q24H    insulin detemir U-100  15 Units Subcutaneous Daily    lipase-protease-amylase  1 capsule Oral TID    metronidazole  500 mg Intravenous Q8H    midodrine  2.5 mg Oral BID WM      lactated ringers 75 mL/hr at 02/29/20 0201     Vitals:    03/01/20 0733   BP:    Pulse: 72   Resp:    Temp:      I/O last 3 completed shifts:  In: 1761.3 [P.O.:180; I.V.:1581.3]  Out: 2830 [Urine:1890; Drains:940]  Physical Exam   Constitutional: He is oriented to person, place, and time. He appears well-developed and well-nourished. No distress.   HENT:   Head: Normocephalic and atraumatic.   Eyes: EOM are normal. No scleral icterus.   Cardiovascular: Normal rate, regular rhythm, normal heart sounds and intact distal pulses. Exam reveals no gallop and no friction rub.   No murmur heard.  Pulmonary/Chest: Effort normal and breath sounds normal. He has no wheezes. He has no rales.   Abdominal: Soft. Bowel sounds are normal. He exhibits no distension. There is no tenderness.   Musculoskeletal: Normal range of motion. He exhibits edema (1+ BLE).   Lymphadenopathy:     He has no cervical adenopathy.   Neurological: He is alert and oriented to person, place, and time.   Skin: Skin is warm and dry. No rash noted. He is not diaphoretic.   Psychiatric: Thought content normal.     Laboratory:  Recent Labs   Lab 02/27/20  0059 02/27/20  0335 02/28/20  0427   WBC 22.90* 23.44* 12.54   HGB 12.6* 12.6* 11.1*   HCT 37.4* 37.4* 32.9*    157 125*   MONO 6.5  1.5* 4.0 5.0     Recent Labs   Lab 02/28/20  0427 02/28/20  1210 02/29/20  0443 03/01/20  0456   *  134* 133* 136  136 136   K 3.8  3.8 3.9 3.8  3.8 3.7     105 104 107  107 106   CO2 20*  20* 20* 23  23 23   BUN 55*  55* 55* 58*  58* 51*    CREATININE 3.4*  3.4* 3.4* 3.5*  3.5* 3.5*   CALCIUM 7.4*  7.4* 7.8* 7.9*  7.9* 7.8*   PHOS 3.9  --  4.1 4.3     Labs reviewed  Diagnostic Results:  X-Ray: Reviewed  US: Reviewed Right kidney: 11.2 cm. No hydronephrosis.  Left kidney: 11.4 cm. No hydronephrosis  No hydronephrosis.  Normal bilateral resistive indices.  Echo: Reviewed  · Mildly decreased left ventricular systolic function. The estimated ejection fraction is 45-50%  · Left ventricular diastolic dysfunction.  · Normal right ventricular systolic function.  · Normal central venous pressure (3 mm Hg).    ASSESSMENT/PLAN:   Acute renal failure and CK D stage III- IV- history of multiple AK I, in  summer 2019 creatinine up to 3.5, November 2019 creatinine 1.5 on admission creatinine was 1.7  Chronic from long-standing no-controlled diabetes hemoglobin A1c 12.4  Acute - ATN from septic shock  Creatinine seems to  plateu @ ~ 3.5. Urine output 1.2 L  Electrolytes and volume are acceptable, no indication for emergent dialysis    Tolerating PO, had full breakfast this AM, hold IVF, encourage PO water  Hypotension - better on midodrine 2.5 BID      Thank you for allowing me to participate in the care of your patients  With any question please call [phone]  Suzanne Uriostegui    [facility]  DAYDAY Gonzalez MD, LANCE SETH MD,   MD MAIA De Leon, NP  [address]  [place], [address] [phone]

## 2020-03-01 NOTE — ASSESSMENT & PLAN NOTE
2/28 midodrine to prevent use of fluid in chf pt  2/29 monitor while pt is on IVF for renal ATN  3/1 continue gentle hydration

## 2020-03-01 NOTE — SUBJECTIVE & OBJECTIVE
Interval History: alb 1.9, WILL ADD SUPPL, AND CR NO CHANGE IN 3 DAYS AT 3.5. Pt is feeling better  Review of Systems   Constitutional: Negative for activity change, chills and fever.   Respiratory: Negative for shortness of breath.    Cardiovascular: Negative for chest pain.   Gastrointestinal: Negative for abdominal distention, abdominal pain (at the drain site), nausea and vomiting.   Neurological: Negative for light-headedness and headaches.   Psychiatric/Behavioral: Negative for behavioral problems.     Objective:     Vital Signs (Most Recent):  Temp: 96.9 °F (36.1 °C) (03/01/20 1553)  Pulse: 74 (03/01/20 1553)  Resp: 18 (03/01/20 1553)  BP: 116/86 (03/01/20 1553)  SpO2: 99 % (03/01/20 1553) Vital Signs (24h Range):  Temp:  [96.3 °F (35.7 °C)-98.6 °F (37 °C)] 96.9 °F (36.1 °C)  Pulse:  [70-85] 74  Resp:  [17-18] 18  SpO2:  [96 %-100 %] 99 %  BP: ()/(52-86) 116/86     Weight: 115.5 kg (254 lb 10.1 oz)  Body mass index is 39.88 kg/m².    Intake/Output Summary (Last 24 hours) at 3/1/2020 1556  Last data filed at 3/1/2020 0900  Gross per 24 hour   Intake 430 ml   Output 840 ml   Net -410 ml      Physical Exam   Constitutional: He is oriented to person, place, and time. He appears well-developed and well-nourished.   HENT:   Head: Normocephalic and atraumatic.   Neck: Normal range of motion. Neck supple.   Cardiovascular: Normal rate, regular rhythm and normal heart sounds. Exam reveals no gallop and no friction rub.   No murmur heard.  Pulmonary/Chest: Effort normal and breath sounds normal. No respiratory distress.   Abdominal: Soft. Bowel sounds are normal. There is no tenderness.   Musculoskeletal: Normal range of motion. He exhibits no edema or tenderness.   Neurological: He is alert and oriented to person, place, and time.   Skin: Skin is warm.   Psychiatric: He has a normal mood and affect. His speech is normal and behavior is normal. Thought content normal. His mood appears not anxious. Cognition and  memory are normal. He does not exhibit a depressed mood.       Significant Labs:   Bilirubin:   Recent Labs   Lab 02/26/20  0214 02/27/20  0335 02/28/20  0427 02/29/20 0443 03/01/20 0456   BILITOT 1.2* 0.6 0.4 0.4 0.3     No results for input(s): LACTATE in the last 48 hours.  Lipase:   No results for input(s): LIPASE in the last 48 hours.  Magnesium:   Recent Labs   Lab 02/29/20 0443   MG 2.3     Recent Labs   Lab 02/27/20  0059 02/27/20 0335 02/28/20 0427   WBC 22.90* 23.44* 12.54   HGB 12.6* 12.6* 11.1*   HCT 37.4* 37.4* 32.9*    157 125*     Recent Labs   Lab 02/25/20  0618  02/28/20  0427 02/28/20  1210 02/29/20 0443 03/01/20 0456   *   < > 134*  134* 133* 136  136 136   K 4.7   < > 3.8  3.8 3.9 3.8  3.8 3.7      < > 105  105 104 107  107 106   CO2 19*   < > 20*  20* 20* 23  23 23   BUN 23   < > 55*  55* 55* 58*  58* 51*   CREATININE 1.4   < > 3.4*  3.4* 3.4* 3.5*  3.5* 3.5*   *   < > 216*  216* 295* 128*  128* 137*   CALCIUM 7.8*   < > 7.4*  7.4* 7.8* 7.9*  7.9* 7.8*   MG 1.7   < > 2.1 2.1 2.3  --    PHOS 2.3*   < > 3.9  --  4.1 4.3   LIPASE 4  --   --   --   --   --     < > = values in this interval not displayed.     Recent Labs   Lab 02/25/20  1826  02/28/20  0427 02/29/20 0443 03/01/20 0456   ALKPHOS  --    < > 197* 183* 202*   ALT  --    < > 20 15 13   AST  --    < > 14 11 13   ALBUMIN  --    < > 1.8* 2.0* 1.9*   PROT  --    < > 4.7* 5.1* 4.8*   BILITOT  --    < > 0.4 0.4 0.3   INR 1.0  --   --   --   --     < > = values in this interval not displayed.      No results for input(s): CPK, CPKMB, MB, TROPONINI in the last 72 hours.  Recent Labs   Lab 02/29/20  1122 02/29/20  1653 02/29/20 2020 03/01/20  0411 03/01/20  1146 03/01/20  1317   POCTGLUCOSE 146* 165* 224* 133* 188* 254*     Hemoglobin A1C   Date Value Ref Range Status   02/23/2020 12.4 (H) 4.0 - 5.6 % Final     Comment:     ADA Screening Guidelines:  5.7-6.4%  Consistent with  prediabetes  >or=6.5%  Consistent with diabetes  High levels of fetal hemoglobin interfere with the HbA1C  assay. Heterozygous hemoglobin variants (HbS, HgC, etc)do  not significantly interfere with this assay.   However, presence of multiple variants may affect accuracy.     02/21/2020 12.5 (H) 4.0 - 5.6 % Final     Comment:     ADA Screening Guidelines:  5.7-6.4%  Consistent with prediabetes  >or=6.5%  Consistent with diabetes  High levels of fetal hemoglobin interfere with the HbA1C  assay. Heterozygous hemoglobin variants (HbS, HgC, etc)do  not significantly interfere with this assay.   However, presence of multiple variants may affect accuracy.     11/15/2019 9.5 (H) 4.0 - 5.6 % Final     Comment:     ADA Screening Guidelines:  5.7-6.4%  Consistent with prediabetes  >or=6.5%  Consistent with diabetes  High levels of fetal hemoglobin interfere with the HbA1C  assay. Heterozygous hemoglobin variants (HbS, HgC, etc)do  not significantly interfere with this assay.   However, presence of multiple variants may affect accuracy.       Scheduled Meds:   apixaban  2.5 mg Oral BID    ceFEPime (MAXIPIME) IVPB  2 g Intravenous Q24H    insulin detemir U-100  15 Units Subcutaneous Daily    lipase-protease-amylase  1 capsule Oral TID    metronidazole  500 mg Intravenous Q8H    midodrine  2.5 mg Oral BID WM     Continuous Infusions:    As Needed: acetaminophen, calcium carbonate, Dextrose 10% Bolus, Dextrose 10% Bolus, Dextrose 10% Bolus, Dextrose 10% Bolus, diphenhydrAMINE, glucagon (human recombinant), glucose, glucose, HYDROcodone-acetaminophen, ibuprofen, insulin aspart U-100, melatonin, ondansetron, ondansetron, pneumoc 13-sugey conj-dip cr(PF), sodium chloride 0.9%       Significant Imaging:  No new imaging

## 2020-03-01 NOTE — PROGRESS NOTES
[facility] Hospital Medicine  Progress Note    Patient Name: Jian Arrieta  MRN: [id number]  Patient Class: IP- Inpatient   Admission Date: 2/23/2020  Length of Stay: 4 days  Attending Physician: Tien Arboleda DO  Primary Care Provider: Leon Barajas DO        Subjective:     Principal Problem:Sepsis        HPI:  Jian Arrieta is a 65 y/o M with h/o CAD, chronic pancreatitis, HTN, chronic Lymphedema, former smoker, morbid obesity, DM II, CABG in x3 years ago (2017), last stents were placed x2 years ago (2018). present with 2 days history of chest heaviness and pain located in the epigastric area, pain is about 2-3 radiating to the back. Pain is exacerbated with exertion like riding lawnmower. There is an associated right flank pain. He denies chills, fever, nausea, or vomiting.     Overview/Hospital Course:  2/27 pt seen, GI had recommended an MRCP, but the pt already had a Cholecystostomy tube placement this mormning, will hold off on the MRCP for now. Ok to provide 1/2 NS  2/28 pt seen, feeling better today,  at site of drain. sug adjusted his abx. Will place on midodrine low dose to prevent the swings and needs for pressors  2/29  Pt is feeling better, more interacting, better urine output, not complaining of pain, can be transferred to tele floor today  3/1 pt seen, feeling much better than prior days. Will continue current abx, and LR and add supp given his low albumin    Interval History: alb 1.9, WILL ADD SUPPL, AND CR NO CHANGE IN 3 DAYS AT 3.5. Pt is feeling better  Review of Systems   Constitutional: Negative for activity change, chills and fever.   Respiratory: Negative for shortness of breath.    Cardiovascular: Negative for chest pain.   Gastrointestinal: Negative for abdominal distention, abdominal pain (at the drain site), nausea and vomiting.   Neurological: Negative for light-headedness and headaches.   Psychiatric/Behavioral: Negative for behavioral problems.      Objective:     Vital Signs (Most Recent):  Temp: 96.9 °F (36.1 °C) (03/01/20 1553)  Pulse: 74 (03/01/20 1553)  Resp: 18 (03/01/20 1553)  BP: 116/86 (03/01/20 1553)  SpO2: 99 % (03/01/20 1553) Vital Signs (24h Range):  Temp:  [96.3 °F (35.7 °C)-98.6 °F (37 °C)] 96.9 °F (36.1 °C)  Pulse:  [70-85] 74  Resp:  [17-18] 18  SpO2:  [96 %-100 %] 99 %  BP: ()/(52-86) 116/86     Weight: 115.5 kg (254 lb 10.1 oz)  Body mass index is 39.88 kg/m².    Intake/Output Summary (Last 24 hours) at 3/1/2020 1556  Last data filed at 3/1/2020 0900  Gross per 24 hour   Intake 430 ml   Output 840 ml   Net -410 ml      Physical Exam   Constitutional: He is oriented to person, place, and time. He appears well-developed and well-nourished.   HENT:   Head: Normocephalic and atraumatic.   Neck: Normal range of motion. Neck supple.   Cardiovascular: Normal rate, regular rhythm and normal heart sounds. Exam reveals no gallop and no friction rub.   No murmur heard.  Pulmonary/Chest: Effort normal and breath sounds normal. No respiratory distress.   Abdominal: Soft. Bowel sounds are normal. There is no tenderness.   Musculoskeletal: Normal range of motion. He exhibits no edema or tenderness.   Neurological: He is alert and oriented to person, place, and time.   Skin: Skin is warm.   Psychiatric: He has a normal mood and affect. His speech is normal and behavior is normal. Thought content normal. His mood appears not anxious. Cognition and memory are normal. He does not exhibit a depressed mood.       Significant Labs:   Bilirubin:   Recent Labs   Lab 02/26/20  0214 02/27/20  0335 02/28/20  0427 02/29/20  0443 03/01/20  0456   BILITOT 1.2* 0.6 0.4 0.4 0.3     No results for input(s): LACTATE in the last 48 hours.  Lipase:   No results for input(s): LIPASE in the last 48 hours.  Magnesium:   Recent Labs   Lab 02/29/20  0443   MG 2.3     Recent Labs   Lab 02/27/20  0059 02/27/20  0335 02/28/20  0427   WBC 22.90* 23.44* 12.54   HGB 12.6* 12.6*  11.1*   HCT 37.4* 37.4* 32.9*    157 125*     Recent Labs   Lab 02/25/20  0618  02/28/20  0427 02/28/20  1210 02/29/20  0443 03/01/20  0456   *   < > 134*  134* 133* 136  136 136   K 4.7   < > 3.8  3.8 3.9 3.8  3.8 3.7      < > 105  105 104 107  107 106   CO2 19*   < > 20*  20* 20* 23  23 23   BUN 23   < > 55*  55* 55* 58*  58* 51*   CREATININE 1.4   < > 3.4*  3.4* 3.4* 3.5*  3.5* 3.5*   *   < > 216*  216* 295* 128*  128* 137*   CALCIUM 7.8*   < > 7.4*  7.4* 7.8* 7.9*  7.9* 7.8*   MG 1.7   < > 2.1 2.1 2.3  --    PHOS 2.3*   < > 3.9  --  4.1 4.3   LIPASE 4  --   --   --   --   --     < > = values in this interval not displayed.     Recent Labs   Lab 02/25/20  1826  02/28/20  0427 02/29/20  0443 03/01/20  0456   ALKPHOS  --    < > 197* 183* 202*   ALT  --    < > 20 15 13   AST  --    < > 14 11 13   ALBUMIN  --    < > 1.8* 2.0* 1.9*   PROT  --    < > 4.7* 5.1* 4.8*   BILITOT  --    < > 0.4 0.4 0.3   INR 1.0  --   --   --   --     < > = values in this interval not displayed.      No results for input(s): CPK, CPKMB, MB, TROPONINI in the last 72 hours.  Recent Labs   Lab 02/29/20  1122 02/29/20  1653 02/29/20 2020 03/01/20  0411 03/01/20  1146 03/01/20  1317   POCTGLUCOSE 146* 165* 224* 133* 188* 254*     Hemoglobin A1C   Date Value Ref Range Status   02/23/2020 12.4 (H) 4.0 - 5.6 % Final     Comment:     ADA Screening Guidelines:  5.7-6.4%  Consistent with prediabetes  >or=6.5%  Consistent with diabetes  High levels of fetal hemoglobin interfere with the HbA1C  assay. Heterozygous hemoglobin variants (HbS, HgC, etc)do  not significantly interfere with this assay.   However, presence of multiple variants may affect accuracy.     02/21/2020 12.5 (H) 4.0 - 5.6 % Final     Comment:     ADA Screening Guidelines:  5.7-6.4%  Consistent with prediabetes  >or=6.5%  Consistent with diabetes  High levels of fetal hemoglobin interfere with the HbA1C  assay. Heterozygous hemoglobin variants  (HbS, HgC, etc)do  not significantly interfere with this assay.   However, presence of multiple variants may affect accuracy.     11/15/2019 9.5 (H) 4.0 - 5.6 % Final     Comment:     ADA Screening Guidelines:  5.7-6.4%  Consistent with prediabetes  >or=6.5%  Consistent with diabetes  High levels of fetal hemoglobin interfere with the HbA1C  assay. Heterozygous hemoglobin variants (HbS, HgC, etc)do  not significantly interfere with this assay.   However, presence of multiple variants may affect accuracy.       Scheduled Meds:   apixaban  2.5 mg Oral BID    ceFEPime (MAXIPIME) IVPB  2 g Intravenous Q24H    insulin detemir U-100  15 Units Subcutaneous Daily    lipase-protease-amylase  1 capsule Oral TID    metronidazole  500 mg Intravenous Q8H    midodrine  2.5 mg Oral BID WM     Continuous Infusions:    As Needed: acetaminophen, calcium carbonate, Dextrose 10% Bolus, Dextrose 10% Bolus, Dextrose 10% Bolus, Dextrose 10% Bolus, diphenhydrAMINE, glucagon (human recombinant), glucose, glucose, HYDROcodone-acetaminophen, ibuprofen, insulin aspart U-100, melatonin, ondansetron, ondansetron, pneumoc 13-sugey conj-dip cr(PF), sodium chloride 0.9%       Significant Imaging:  No new imaging        Assessment/Plan:      * Sepsis  Bacteremia  Blood cx with GNR, on Vanc and Zosyn- de-escalate to Zosyn  Repeat blood cx on 2/27 2/28  Routine Gram stain awilda bottle: Gram negative rods P   Blood Culture, Routine Results called to and read back by: Kylah Bragg RN  02/26/2020   P   Blood Culture, Routine 06:34 P   Blood Culture, Routine Gram stain aer bottle: Gram negative rods P   Blood Culture, Routine Positive results previously called 02/26/2020  23:54 P   Blood Culture, Routine ESCHERICHIA COLIAbnormal  P   Resulting Agency [facility]   Susceptibility      Escherichia coli     CULTURE, BLOOD (Preliminary)     Amikacin <=8 mcg/mL Sensitive     Amox/K Clav'ate <=8/4 mcg/mL Sensitive     Amp/Sulbactam 16/8 mcg/mL Intermediate      Ampicillin >16 mcg/mL Resistant     Cefazolin 4 mcg/mL Sensitive     Ceftriaxone <=1 mcg/mL Sensitive     Ciprofloxacin >2 mcg/mL Resistant     Ertapenem <=0.5 mcg/mL Sensitive     Gentamicin >8 mcg/mL Resistant     Levofloxacin >4 mcg/mL Resistant     Meropenem <=1 mcg/mL Sensitive     Piperacillin/Tazo <=8 mcg/mL Sensitive     Tetracycline <=2 mcg/mL Sensitive     Tobramycin >8 mcg/mL Resistant     Trimeth/Sulfa <=0.5/9.5 m... Sensitive                    Cholecystitis  2/27  GI had recommended an MRCP, but the pt already had a Cholecystostomy tube placement this mormning,   Continue symptomatic management  Off pressors  2/28 add midodrine for his low BP.  Routine Gram stain awilda bottle: Gram negative rods P   Blood Culture, Routine Results called to and read back by: Kylah Bragg RN  02/26/2020   P   Blood Culture, Routine 06:34 P   Blood Culture, Routine Gram stain aer bottle: Gram negative rods P   Blood Culture, Routine Positive results previously called 02/26/2020  23:54 P   Blood Culture, Routine ESCHERICHIA COLIAbnormal  P   Resulting Agency [facility]   Susceptibility      Escherichia coli     CULTURE, BLOOD (Preliminary)     Amikacin <=8 mcg/mL Sensitive     Amox/K Clav'ate <=8/4 mcg/mL Sensitive     Amp/Sulbactam 16/8 mcg/mL Intermediate     Ampicillin >16 mcg/mL Resistant     Cefazolin 4 mcg/mL Sensitive     Ceftriaxone <=1 mcg/mL Sensitive     Ciprofloxacin >2 mcg/mL Resistant     Ertapenem <=0.5 mcg/mL Sensitive     Gentamicin >8 mcg/mL Resistant     Levofloxacin >4 mcg/mL Resistant     Meropenem <=1 mcg/mL Sensitive     Piperacillin/Tazo <=8 mcg/mL Sensitive     Tetracycline <=2 mcg/mL Sensitive     Tobramycin >8 mcg/mL Resistant     Trimeth/Sulfa <=0.5/9.5 m... Sensitive                2/29 pt is being treated for ecoli and prvencia.  Ok to transfer to [facility], off pressors  3/1 comfortable, continue current POC      Bacteremia    Continue zosyn iv  Routine Gram stain awilda bottle: Gram negative rods P    Blood Culture, Routine Results called to and read back by: Kylah Bragg RN  02/26/2020   P   Blood Culture, Routine 06:34 P   Blood Culture, Routine Gram stain aer bottle: Gram negative rods P   Blood Culture, Routine Positive results previously called 02/26/2020  23:54 P   Blood Culture, Routine ESCHERICHIA COLIAbnormal  P   Resulting Agency [facility]   Susceptibility      Escherichia coli     CULTURE, BLOOD (Preliminary)     Amikacin <=8 mcg/mL Sensitive     Amox/K Clav'ate <=8/4 mcg/mL Sensitive     Amp/Sulbactam 16/8 mcg/mL Intermediate     Ampicillin >16 mcg/mL Resistant     Cefazolin 4 mcg/mL Sensitive     Ceftriaxone <=1 mcg/mL Sensitive     Ciprofloxacin >2 mcg/mL Resistant     Ertapenem <=0.5 mcg/mL Sensitive     Gentamicin >8 mcg/mL Resistant     Levofloxacin >4 mcg/mL Resistant     Meropenem <=1 mcg/mL Sensitive     Piperacillin/Tazo <=8 mcg/mL Sensitive     Tetracycline <=2 mcg/mL Sensitive     Tobramycin >8 mcg/mL Resistant     Trimeth/Sulfa <=0.5/9.5 m... Sensitive                2/29 continue current double abx  afebrile    Chronic kidney disease, stage 3  3/1 add nepro for alb of 1.9      Chronic acquired lymphedema  chronic      ELIEZER (acute kidney injury)  C2/27 hronic kidney disease, stage 3  Renally dose medication  Continue IVF   Cr up to 3 from 2 and 2.5, gentle hydration  2/28 cr up yo 3.4 appreciate nephro consult  2/29 appreciate renal inpput, treating and monitoring ATN response  Continue LR at 50 cc /hr  3/1 cr stable x 3 days at 3.5    Type 2 diabetes mellitus with diabetic neuropathy, with long-term current use of insulin  On   levemir  Low dose SSI  accucheck  Diabetic diet      Chronic diastolic heart failure  2/28 midodrine to prevent use of fluid in chf pt  2/29 monitor while pt is on IVF for renal ATN  3/1 continue gentle hydration    Coronary artery disease due to calcified coronary lesion  2/28 restart Eliquis        VTE Risk Mitigation (From admission, onward)         Ordered      apixaban tablet 2.5 mg  2 times daily      02/28/20 1820     Place sequential compression device  Until discontinued      02/23/20 1704                      Tien Arboleda DO  Department of Hospital Medicine   [facility]

## 2020-03-01 NOTE — ASSESSMENT & PLAN NOTE
C2/27 hronic kidney disease, stage 3  Renally dose medication  Continue IVF   Cr up to 3 from 2 and 2.5, gentle hydration  2/28 cr up yo 3.4 appreciate nephro consult  2/29 appreciate renal inpput, treating and monitoring ATN response  Continue LR at 50 cc /hr  3/1 cr stable x 3 days at 3.5

## 2020-03-01 NOTE — PLAN OF CARE
Pt received on RA.  SPO2  97%.  Pt in no apparent respiratory distress.  Will continue to monitor.

## 2020-03-01 NOTE — NURSING
Patient declined air mattress placement tonight. Patient said he wants it done tomorrow and doesn't feel like getting up at the moment.

## 2020-03-01 NOTE — PLAN OF CARE
Relevant Hx:  Patient with multiple comorbid conditions presents with biliary sepsis  Course:  Status post cholecystostomy tube placement.  The output was described as Karla ent, foul-smelling, and clearly infected.  Daily Update:  Cultures from the bile and the blood show E coli that is relatively sensitive to a number of intravenous antibiotics., chronic kidney disease appears stable,    The patient does not appear toxic  Hemodynamically stable    Today's Plan:  Continue intravenous antibiotics to cover the bacteremia.  Will need a minimum of 14 days.    I have seen and examined the patient and discussed the coordination of care with the resident staff and patient. A cholecystostomy tumors a highly effective method for controlling biliary sepsis in patients with comorbid conditions that are clear 's of their mortality.  If the patient ultimately meets discharge criteria, he will need to go with the cholecystostomy tube in place and instructions on how to flush and care for the tube for a minimum of 4-6 weeks.  Once a chronic tract has been formed we can consider a fluoroscopic examination through the 2 to document whether not the cystic duct remained patent. If the gallbladder was chronically obstructed and there is no communication with the more central biliary tree, we would manage the cholecystostomy tube like an abscess drain and once the drain output comes down to approximately 30 cc per day or less we can consider removing the tube. If there is persistent connection between the gallbladder and the more central biliary tree, the risk of reinfection is high and the only long-term solution would be a cholecystectomy.  If the patient remains too high risk for that procedure, we can consider internal stenting of the biliary tree versus keeping the cholecystostomy tube in indefinitely.

## 2020-03-01 NOTE — PLAN OF CARE
Problem: Physical Therapy Goal  Goal: Physical Therapy Goal  Description  Goals to be met by: 3/28/2020     Patient will increase functional independence with mobility by performin. Supine to sit with Modified [place]  2. Sit to stand transfer with Modified [place]  3. Bed to chair transfer with Modified [place] using appropriate AD  4. Gait  x 100 feet with Modified [place] using appropriate AD.   5. Lower extremity exercise program x15 reps per handout, with supervision     Outcome: Ongoing, Progressing   Continue working toward goals.

## 2020-03-02 LAB
ALBUMIN SERPL BCP-MCNC: 1.9 G/DL (ref 3.5–5.2)
ALP SERPL-CCNC: 260 U/L (ref 55–135)
ALT SERPL W/O P-5'-P-CCNC: 23 U/L (ref 10–44)
ANION GAP SERPL CALC-SCNC: 8 MMOL/L (ref 8–16)
AST SERPL-CCNC: 40 U/L (ref 10–40)
BACTERIA SPEC ANAEROBE CULT: ABNORMAL
BILIRUB SERPL-MCNC: 0.3 MG/DL (ref 0.1–1)
BUN SERPL-MCNC: 47 MG/DL (ref 8–23)
CALCIUM SERPL-MCNC: 7.8 MG/DL (ref 8.7–10.5)
CHLORIDE SERPL-SCNC: 107 MMOL/L (ref 95–110)
CO2 SERPL-SCNC: 20 MMOL/L (ref 23–29)
CREAT SERPL-MCNC: 3.4 MG/DL (ref 0.5–1.4)
EST. GFR  (AFRICAN AMERICAN): 21 ML/MIN/1.73 M^2
EST. GFR  (NON AFRICAN AMERICAN): 18 ML/MIN/1.73 M^2
GLUCOSE SERPL-MCNC: 286 MG/DL (ref 70–110)
PHOSPHATE SERPL-MCNC: 3.9 MG/DL (ref 2.7–4.5)
POCT GLUCOSE: 280 MG/DL (ref 70–110)
POCT GLUCOSE: 283 MG/DL (ref 70–110)
POCT GLUCOSE: 344 MG/DL (ref 70–110)
POCT GLUCOSE: 374 MG/DL (ref 70–110)
POCT GLUCOSE: 405 MG/DL (ref 70–110)
POTASSIUM SERPL-SCNC: 3.9 MMOL/L (ref 3.5–5.1)
PROT SERPL-MCNC: 4.9 G/DL (ref 6–8.4)
SODIUM SERPL-SCNC: 135 MMOL/L (ref 136–145)

## 2020-03-02 PROCEDURE — 97530 THERAPEUTIC ACTIVITIES: CPT | Mod: CQ

## 2020-03-02 PROCEDURE — 11000001 HC ACUTE MED/SURG PRIVATE ROOM

## 2020-03-02 PROCEDURE — 84100 ASSAY OF PHOSPHORUS: CPT

## 2020-03-02 PROCEDURE — 94761 N-INVAS EAR/PLS OXIMETRY MLT: CPT

## 2020-03-02 PROCEDURE — 25000003 PHARM REV CODE 250: Performed by: FAMILY MEDICINE

## 2020-03-02 PROCEDURE — 63600175 PHARM REV CODE 636 W HCPCS: Performed by: FAMILY MEDICINE

## 2020-03-02 PROCEDURE — S0030 INJECTION, METRONIDAZOLE: HCPCS | Performed by: STUDENT IN AN ORGANIZED HEALTH CARE EDUCATION/TRAINING PROGRAM

## 2020-03-02 PROCEDURE — 25000003 PHARM REV CODE 250: Performed by: STUDENT IN AN ORGANIZED HEALTH CARE EDUCATION/TRAINING PROGRAM

## 2020-03-02 PROCEDURE — 80053 COMPREHEN METABOLIC PANEL: CPT

## 2020-03-02 PROCEDURE — C9399 UNCLASSIFIED DRUGS OR BIOLOG: HCPCS | Performed by: FAMILY MEDICINE

## 2020-03-02 PROCEDURE — 97116 GAIT TRAINING THERAPY: CPT | Mod: CQ

## 2020-03-02 PROCEDURE — 25000003 PHARM REV CODE 250: Performed by: HOSPITALIST

## 2020-03-02 PROCEDURE — 63600175 PHARM REV CODE 636 W HCPCS: Performed by: STUDENT IN AN ORGANIZED HEALTH CARE EDUCATION/TRAINING PROGRAM

## 2020-03-02 RX ORDER — ATORVASTATIN CALCIUM 40 MG/1
40 TABLET, FILM COATED ORAL DAILY
COMMUNITY
End: 2020-03-10 | Stop reason: DRUGHIGH

## 2020-03-02 RX ORDER — METRONIDAZOLE 500 MG/1
500 TABLET ORAL EVERY 8 HOURS
Status: DISCONTINUED | OUTPATIENT
Start: 2020-03-02 | End: 2020-03-03 | Stop reason: HOSPADM

## 2020-03-02 RX ORDER — MIDODRINE HYDROCHLORIDE 2.5 MG/1
2.5 TABLET ORAL DAILY PRN
Status: DISCONTINUED | OUTPATIENT
Start: 2020-03-02 | End: 2020-03-03

## 2020-03-02 RX ORDER — INSULIN ASPART 100 [IU]/ML
5 INJECTION, SOLUTION INTRAVENOUS; SUBCUTANEOUS
Status: DISCONTINUED | OUTPATIENT
Start: 2020-03-03 | End: 2020-03-03 | Stop reason: HOSPADM

## 2020-03-02 RX ADMIN — INSULIN ASPART 5 UNITS: 100 INJECTION, SOLUTION INTRAVENOUS; SUBCUTANEOUS at 08:03

## 2020-03-02 RX ADMIN — INSULIN ASPART 10 UNITS: 100 INJECTION, SOLUTION INTRAVENOUS; SUBCUTANEOUS at 12:03

## 2020-03-02 RX ADMIN — INSULIN ASPART 6 UNITS: 100 INJECTION, SOLUTION INTRAVENOUS; SUBCUTANEOUS at 08:03

## 2020-03-02 RX ADMIN — METRONIDAZOLE 500 MG: 500 INJECTION, SOLUTION INTRAVENOUS at 05:03

## 2020-03-02 RX ADMIN — METRONIDAZOLE 500 MG: 500 TABLET, FILM COATED ORAL at 10:03

## 2020-03-02 RX ADMIN — CEFEPIME 2 G: 2 INJECTION, POWDER, FOR SOLUTION INTRAVENOUS at 12:03

## 2020-03-02 RX ADMIN — INSULIN DETEMIR 15 UNITS: 100 INJECTION, SOLUTION SUBCUTANEOUS at 09:03

## 2020-03-02 RX ADMIN — PANCRELIPASE 1 CAPSULE: 36000; 180000; 114000 CAPSULE, DELAYED RELEASE PELLETS ORAL at 08:03

## 2020-03-02 RX ADMIN — APIXABAN 2.5 MG: 2.5 TABLET, FILM COATED ORAL at 08:03

## 2020-03-02 RX ADMIN — MIDODRINE HYDROCHLORIDE 2.5 MG: 2.5 TABLET ORAL at 08:03

## 2020-03-02 NOTE — PROGRESS NOTES
VN entered into the patient's room with permission via [employer] system. The patient was awake and alert in bed. Nurse at bedside. The patient currently denies any pain and has no questions or concerns at this time. Patient was in good spirits and is looking forward to going home. Chart review completed. Will continue to monitor and remain available as needed.      03/02/20 1210   Type of Frequent Check   Type Patient Rounds   Safety/Activity   Patient Rounds visualized patient;ID band on;bed wheels locked   Safety Promotion/Fall Prevention assistive device/personal item within reach   Activity Management activity adjusted per tolerance   Positioning   Body Position positioned/repositioned independently   Head of Bed (HOB) HOB elevated   Positioning/Transfer Devices pillows;in use   Pain/Comfort/Sleep   Preferred Pain Scale number (Numeric Rating Pain Scale)   Comfort/Acceptable Pain Level 3   Pain Rating (0-10): Rest 0   Pain Rating (0-10): Activity 0        Cardiac/Telemetry Details / Alarms   Cardiac/Telemetry Monitor On Yes   Cardiac/Telemetry Audible Yes   Cardiac/Telemetry Alarms Set Yes   ECG   Lead Monitored Lead II   Rhythm normal sinus rhythm   Assessments (Pre/Post)   Level of Consciousness (AVPU) alert

## 2020-03-02 NOTE — PT/OT/SLP PROGRESS
Occupational Therapy  Visit Attempt     Patient Name:  Jian Arrieta   MRN:  [id number]    Patient not seen at this time 2/2 reports of fatigue. Requesting to rest at this time and for therapist to return later today's date.   Will follow up as available.     Tiana Delcid OT  3/2/2020

## 2020-03-02 NOTE — PT/OT/SLP PROGRESS
Physical Therapy      Patient Name:  Jian Arrieta   MRN:  [id number]    PTA attempted to see pt at 1158 and pt asked if PTA could return later.  PTA attempted to see pt at 1508, but pt stated he just woke up and was waiting on nsg to empty his drain bag. Will follow-up again this afternoon if time permits.    Sandra Martinez, PTA

## 2020-03-02 NOTE — PROGRESS NOTES
Pharmacist Intervention IV to PO Note    Jian Arrieta is a 64 y.o. male being treated with IV medication metronidazole    Patient Data:    Vital Signs (Most Recent):  Temp: 97.2 °F (36.2 °C) (03/02/20 0723)  Pulse: 80 (03/02/20 0723)  Resp: 18 (03/02/20 0723)  BP: 132/61 (03/02/20 0723)  SpO2: 98 % (03/02/20 0723) Vital Signs (72h Range):  Temp:  [96.3 °F (35.7 °C)-98.9 °F (37.2 °C)]   Pulse:  [60-92]   Resp:  [1-41]   BP: ()/(48-86)   SpO2:  [90 %-100 %]   Arterial Line BP: ()/(42-77)      CBC:  Recent Labs   Lab 02/27/20  0059 02/27/20  0335 02/28/20  0427   WBC 22.90* 23.44* 12.54   RBC 4.33* 4.31* 3.77*   HGB 12.6* 12.6* 11.1*   HCT 37.4* 37.4* 32.9*    157 125*   MCV 86 87 87   MCH 29.1 29.2 29.4   MCHC 33.7 33.7 33.7     CMP:     Recent Labs   Lab 02/29/20  0443 03/01/20  0456 03/02/20  0601   *  128* 137* 286*   CALCIUM 7.9*  7.9* 7.8* 7.8*   ALBUMIN 2.0* 1.9* 1.9*   PROT 5.1* 4.8* 4.9*     136 136 135*   K 3.8  3.8 3.7 3.9   CO2 23  23 23 20*     107 106 107   BUN 58*  58* 51* 47*   CREATININE 3.5*  3.5* 3.5* 3.4*   ALKPHOS 183* 202* 260*   ALT 15 13 23   AST 11 13 40   BILITOT 0.4 0.3 0.3       Dietary Orders:  Diet Orders            Dietary nutrition supplements [facility]; [facility] - Any flavor starting at 03/01 1800    Diet diabetic [facility]; 2000 Calorie: Diabetic starting at 02/27 1548            Based on the following criteria, this patient qualifies for intravenous to oral conversion:  [x] The patients gastrointestinal tract is functioning (tolerating medications via oral or enteral route for 24 hours and tolerating food or enteral feeds for 24 hours).  [x] The patient is hemodynamically stable for 24 hours (heart rate <100 beats per minute, systolic blood pressure >99 mm Hg, and respiratory rate <20 breaths per minute).  [x] The patient shows clinical improvement (afebrile for at least 24 hours and white blood cell count  downtrending or normalized). Additionally, the patient must be non-neutropenic (absolute neutrophil count >500 cells/mm3).  [x] For antimicrobials, the patient has received IV therapy for at least 24 hours.    IV medication emtronidazole will be changed to oral medication metronidazole    Pharmacist's Name: Rasta Griffith  Pharmacist's Extension: [phone]

## 2020-03-02 NOTE — PHYSICIAN QUERY
PT Name: Jian Arrieta  MR #: [id number]    Physician Query Form - Diagnosis Clarification     CDS: Gayathri Trinidad RN, CCDS         Contact information :[phone]  [email or web address]     This form is a permanent document in the medical record.     Query Date: March 2, 2020      By submitting this query, we are merely seeking further clarification of documentation.  Please utilize your independent clinical judgment when addressing the question(s) below.      The Medical record contains the following:   Diagnosis Supporting Clinical Information Location in Medical Record     Acute - ATN from septic shock  Creatinine seems to reflect all 3.4 yesterday 3.5 today. Urine output 1.4              Low BP  ELIEZER  June 2019 EF 45%   Now SBP 79  MAP to 60 with Trendelenburg  Concern for causing fluid overload   As BP stayed low will need central line and arterial line and titrate up  On levophed       Sepsis  Bacteremia  Blood cx with GNR, on Vanc and Zosyn- de-escalate to Zosyn       Nephrology PN 2/29/20      Critical care medicine 2/26/20                        Hospital Medicine PN 2/27/20           Please clarify  diagnosis of  Septic shock     [  X  ] Ruled in   [    ] Ruled out   [    ] Other/Clarification of findings:   [  ] Clinically undetermined

## 2020-03-02 NOTE — PT/OT/SLP PROGRESS
Physical Therapy Treatment    Patient Name:  Jian Arrieta   MRN:  [id number]    Recommendations:     Discharge Recommendations:  home health PT, home health OT   Discharge Equipment Recommendations: none   Barriers to discharge: None    Assessment:     Jian Arrieta is a 64 y.o. male admitted with a medical diagnosis of Sepsis.  He presents with the following impairments/functional limitations:  weakness, impaired endurance, impaired functional mobilty, gait instability, impaired balance, decreased lower extremity function, decreased ROM, impaired skin, impaired cardiopulmonary response to activity.  Pt would continue to benefit from P.T. To address impairments listed above.  .    Rehab Prognosis: Good; patient would benefit from acute skilled PT services to address these deficits and reach maximum level of function.    Recent Surgery: Procedure(s) (LRB):  ULTRASOUND, UPPER GI TRACT, ENDOSCOPIC (N/A) 5 Days Post-Op    Plan:     During this hospitalization, patient to be seen 5 x/week to address the identified rehab impairments via gait training, therapeutic activities, therapeutic exercises, neuromuscular re-education and progress toward the following goals:    · Plan of Care Expires:  03/28/20    Subjective       Patient/Family Comments/goals: Pt agreed to tx.  Pain/Comfort:  · Pain Rating 1: 0/10  · Pain Rating Post-Intervention 1: 0/10      Objective:     Communicated with RN (Iris) prior to session.  Patient found supine with telemetry, central line upon PT entry to room.     General Precautions: Standard, fall   Orthopedic Precautions:N/A   Braces:       Functional Mobility:  · Bed Mobility:     · Rolling Right: modified independence  · Scooting: modified independence  · Supine to Sit: modified independence  · Transfers:     · Sit to Stand:  modified independence and supervision with no AD  · Toilet Transfer: supervision with  no AD  using  Step Transfer  · Gait: 100ft x 2 without A.D. and CGA/SBA.  On  standing rest break secondary to decreased strength/endurance. and mild SOB.  Pt was instructed in PLB during rest break.  Pt declined further gait at this time.  · Balance: sitting good, standing fair+/good-, gait fair/fair+      AM-PAC 6 CLICK MOBILITY  Turning over in bed (including adjusting bedclothes, sheets and blankets)?: 4  Sitting down on and standing up from a chair with arms (e.g., wheelchair, bedside commode, etc.): 3  Moving from lying on back to sitting on the side of the bed?: 4  Moving to and from a bed to a chair (including a wheelchair)?: 3  Need to walk in hospital room?: 3  Climbing 3-5 steps with a railing?: 2  Basic Mobility Total Score: 19       Therapeutic Activities and Exercises:   Seated bLE therex: AP, LAQ, hip flexion/ABD/ADD x 15 reps.  Pt ambulated ~20ft to bathroom and transferred onto and off of toilet with S.    Patient left up in chair with all lines intact, call button in reach, chair alarm on and RN notified..    GOALS:   Multidisciplinary Problems     Physical Therapy Goals        Problem: Physical Therapy Goal    Goal Priority Disciplines Outcome Goal Variances Interventions   Physical Therapy Goal     PT, PT/OT Ongoing, Progressing     Description:  Goals to be met by: 3/28/2020     Patient will increase functional independence with mobility by performin. Supine to sit with Modified [place]  2. Sit to stand transfer with Modified [place]  3. Bed to chair transfer with Modified [place] using appropriate AD  4. Gait  x 100 feet with Modified [place] using appropriate AD.   5. Lower extremity exercise program x15 reps per handout, with supervision                      Time Tracking:     PT Received On: 20  PT Start Time: 1607     PT Stop Time: 1636  PT Total Time (min): 29 min     Billable Minutes: Gait Training 13 and Therapeutic Activity 16    Treatment Type: Treatment  PT/PTA: PTA     PTA Visit Number: 1     Sandra Martinez PTA  2020

## 2020-03-02 NOTE — PLAN OF CARE
Plan of care reviewed with patient and sister. Pt AAO and able to call staff for assistance. Drain in place with green drainage of 50cc in bag. George in place with noted yellow urine in the bag. Pt worked with therapy today. SSI given for elevated BS with 0 s/sx of hypoglycemia. Pt denies pain when asked.Verbalizes to staff understanding. NSR on monitor with 0 red alarms noted.  No acute distress observed at this time. Side rails x2, bed in low position, call bell within reach. Bed alarm in place for patient safety. Will relay to oncoming nurse to monitor for changes in condition.

## 2020-03-02 NOTE — PROGRESS NOTES
Progress Note  Nephrology      Consult Requested By: Tien Arboleda DO  Reason for Consult: ARF    SUBJECTIVE:     Review of Systems   Constitutional: Negative for chills and fever.   Respiratory: Negative for cough and shortness of breath.    Cardiovascular: Negative for chest pain and leg swelling.   Gastrointestinal: Negative for abdominal pain, nausea and vomiting.     Patient Active Problem List   Diagnosis    Coronary artery disease due to calcified coronary lesion    Type 2 diabetes mellitus, with long-term current use of insulin    Sleep apnea    Status post bariatric surgery    Anasarca    Atherosclerosis    Pseudocyst of pancreas    Chronic diastolic heart failure    Lymphedema of both lower extremities    Type 2 diabetes mellitus with diabetic neuropathy, with long-term current use of insulin    Hypoalbuminemia    Myocardiopathy    Other ascites    ELIEZER (acute kidney injury)    Anemia    NSTEMI (non-ST elevated myocardial infarction)    Paroxysmal atrial fibrillation    Hypertension associated with diabetes    Hyperlipidemia associated with type 2 diabetes mellitus    Obesity hypoventilation syndrome    Symptomatic anemia    Alteration in skin integrity    Venous stasis dermatitis of both lower extremities    Acute renal failure with specified lesion    S/P abdominal paracentesis    Decreased mobility    Discharge planning issues    Malnutrition of moderate degree    Hepatic fibrosis    Portal hypertension due to obstruction of extrahepatic portal vein    Venous stasis ulcer of left lower leg with edema of left lower leg    Chronic acquired lymphedema    Gastritis    Chronic kidney disease, stage 3    Obesity, Class III, BMI 40-49.9 (morbid obesity)    Decreased strength    Impaired functional mobility, balance, gait, and endurance    Medically noncompliant    Type 2 diabetes mellitus with stage 3 chronic kidney disease, without long-term current use of insulin     Non compliance with medical treatment    Bacteremia    Sepsis    Cholecystitis       OBJECTIVE:     Medications:   apixaban  2.5 mg Oral BID    ceFEPime (MAXIPIME) IVPB  2 g Intravenous Q24H    insulin detemir U-100  15 Units Subcutaneous Daily    lipase-protease-amylase  1 capsule Oral TID    metroNIDAZOLE  500 mg Oral Q8H    midodrine  2.5 mg Oral BID WM       Vitals:    03/02/20 1543   BP:    Pulse: 78   Resp:    Temp:      I/O last 3 completed shifts:  In: 2330 [P.O.:1430; IV Piggyback:900]  Out: 4990 [Urine:4750; Drains:240]  Physical Exam   Constitutional: He is oriented to person, place, and time. He appears well-developed and well-nourished. No distress.   HENT:   Head: Normocephalic and atraumatic.   Eyes: EOM are normal. No scleral icterus.   Cardiovascular: Normal rate, regular rhythm, normal heart sounds and intact distal pulses. Exam reveals no gallop and no friction rub.   No murmur heard.  Pulmonary/Chest: Effort normal and breath sounds normal. He has no wheezes. He has no rales.   Abdominal: Soft. Bowel sounds are normal. He exhibits no distension. There is no tenderness.   Musculoskeletal: Normal range of motion. He exhibits edema (1+ BLE).   Lymphadenopathy:     He has no cervical adenopathy.   Neurological: He is alert and oriented to person, place, and time.   Skin: Skin is warm and dry. No rash noted. He is not diaphoretic.   Psychiatric: Thought content normal.     Laboratory:  Recent Labs   Lab 02/27/20  0059 02/27/20  0335 02/28/20  0427   WBC 22.90* 23.44* 12.54   HGB 12.6* 12.6* 11.1*   HCT 37.4* 37.4* 32.9*    157 125*   MONO 6.5  1.5* 4.0 5.0     Recent Labs   Lab 02/29/20  0443 03/01/20  0456 03/02/20  0601     136 136 135*   K 3.8  3.8 3.7 3.9     107 106 107   CO2 23  23 23 20*   BUN 58*  58* 51* 47*   CREATININE 3.5*  3.5* 3.5* 3.4*   CALCIUM 7.9*  7.9* 7.8* 7.8*   PHOS 4.1 4.3 3.9     Labs reviewed  Diagnostic Results:  X-Ray: Reviewed  US:  Reviewed Right kidney: 11.2 cm. No hydronephrosis.  Left kidney: 11.4 cm. No hydronephrosis  No hydronephrosis.  Normal bilateral resistive indices.  Echo: Reviewed  · Mildly decreased left ventricular systolic function. The estimated ejection fraction is 45-50%  · Left ventricular diastolic dysfunction.  · Normal right ventricular systolic function.  · Normal central venous pressure (3 mm Hg).    ASSESSMENT/PLAN:   Acute renal failure and CK D stage III- IV- history of multiple AK I, in  summer 2019 creatinine up to 3.5, November 2019 creatinine 1.5 on admission creatinine was 1.7  Chronic from long-standing no-controlled diabetes hemoglobin A1c 12.4  Acute - ATN from septic shock  Creatinine seems to  plateu @ ~ 3.5. Urine output ? 4.1 L after stopping IVF. ?? Polyuric phase of ATN. Closely monitor, might need to restart IVF to match 2/3 of output  Electrolytes and volume are acceptable, no indication for emergent dialysis    Tolerating PO,   Hypotension - better on midodrine 2.5 BID. Change to PRN SBP < 100      Thank you for allowing me to participate in the care of your patients  With any question please call [phone]  Suzanne Uriostegui    [facility]  DAYDAY Gonzalez MD, LANCE SETH MD,   MD MAIA De Leon, RADHA  [address]  [place], [phone]

## 2020-03-02 NOTE — PLAN OF CARE
Problem: Physical Therapy Goal  Goal: Physical Therapy Goal  Description  Goals to be met by: 3/28/2020     Patient will increase functional independence with mobility by performin. Supine to sit with Modified [place] met 3/2/20  2. Sit to stand transfer with Modified [place]  3. Bed to chair transfer with Modified [place] using appropriate AD  4. Gait  x 100 feet with Modified [place] using appropriate AD.   5. Lower extremity exercise program x15 reps per handout, with supervision met 3/2/20     Outcome: Ongoing, Progressing

## 2020-03-02 NOTE — PLAN OF CARE
Problem: Adult Inpatient Plan of Care  Goal: Plan of Care Review    Patient is awake and orientedx4. Care plan explained to patient; he verbalized understanding. On room air, O2 saturation at 96%. Hooked to heart monitor running normal sinus rhythm at 76-92bpm. No pain/n/v/d during shift. Due medications given. Accuchecks completed, covered per sliding scale. Encouraged to turn every 2 hours as tolerated. Maintained on fall risk precaution. Bed in lowest position, bed alarm on, call light/personal items within reach and instructed to call for help when needed. Will continue to monitor.    Outcome: Ongoing, Progressing

## 2020-03-02 NOTE — PLAN OF CARE
Visit with pt for d/c planning.  Pt lives at home with wife, states she does work during the day but will have assistance from neighbors if needed.  Pt will require home health for assistance with drain flushing/emptying as well as home IV antibiotics.  PHN to assign agency once orders received.  Pt will also require PICC line prior to d/c along with home infusion company for antibiotic supply.       03/02/20 1023   Discharge Reassessment   Assessment Type Discharge Planning Reassessment   Provided patient/caregiver education on the expected discharge date and the discharge plan Yes   Do you have any problems affording any of your prescribed medications? No   Discharge Plan B Home Health   DME Needed Upon Discharge  none   Patient choice form signed by patient/caregiver N/A   Anticipated Discharge Disposition Home-Health  (to be assigned by PHN)   Can the patient/caregiver answer the patient profile reliably? Yes, cognitively intact   How does the patient rate their overall health at the present time? Fair   Describe the patient's ability to walk at the present time. No restrictions   How often would a person be available to care for the patient? Often   Number of comorbid conditions (as recorded on the chart) Two   During the past month, has the patient often been bothered by feeling down, depressed or hopeless? No   Post-Acute Status   Post-Acute Authorization Home Health/Hospice   Home Health/Hospice Status   (to be assigned by PHN)   Discharge Delays None known at this time       Kelin Franz RN    [phone]

## 2020-03-02 NOTE — PROGRESS NOTES
Pharmacy New Medication Education    Patient and/or Caregiver ACCEPTED medication education.    Pharmacy has provided education on the name, indication, and possible side effects of the medication(s) prescribed, using teach-back method.     Learners of pharmacy medication education includes:  patient    The following medications have also been discussed, during this admission.     Current Facility-Administered Medications   Medication Frequency    acetaminophen tablet 650 mg Q6H PRN    apixaban tablet 2.5 mg BID    calcium carbonate 200 mg calcium (500 mg) chewable tablet 1,000 mg TID PRN    cefepime 2 g in dextrose 5% 50 mL IVPB (ready to mix system) Q24H    dextrose 10% (D10W) Bolus PRN    dextrose 10% (D10W) Bolus PRN    dextrose 10% (D10W) Bolus PRN    dextrose 10% (D10W) Bolus PRN    diphenhydrAMINE injection 12.5 mg Q6H PRN    glucagon (human recombinant) injection 1 mg PRN    glucose chewable tablet 16 g PRN    glucose chewable tablet 24 g PRN    HYDROcodone-acetaminophen 5-325 mg per tablet 1 tablet Q6H PRN    ibuprofen tablet 400 mg Q8H PRN    insulin aspart U-100 pen 1-10 Units QID (AC + HS) PRN    insulin detemir U-100 pen 15 Units Daily    lipase-protease-amylase 36,000-114,000- 180,000 unit CpDR 1 capsule TID    melatonin tablet 6 mg Nightly PRN    metroNIDAZOLE tablet 500 mg Q8H    midodrine tablet 2.5 mg BID WM    ondansetron disintegrating tablet 8 mg Q8H PRN    ondansetron injection 8 mg Q6H PRN    pneumoc 13-sugey conj-dip cr(PF) (PREVNAR 13 (PF)) 0.5 mL Prior to discharge    sodium chloride 0.9% flush 10 mL PRN          Thank you  Art Griffith, PharmD

## 2020-03-03 VITALS
RESPIRATION RATE: 18 BRPM | OXYGEN SATURATION: 97 % | WEIGHT: 290.56 LBS | HEART RATE: 76 BPM | BODY MASS INDEX: 45.61 KG/M2 | DIASTOLIC BLOOD PRESSURE: 72 MMHG | TEMPERATURE: 98 F | SYSTOLIC BLOOD PRESSURE: 140 MMHG | HEIGHT: 67 IN

## 2020-03-03 PROBLEM — N17.9 AKI (ACUTE KIDNEY INJURY): Status: RESOLVED | Noted: 2019-03-13 | Resolved: 2020-03-03

## 2020-03-03 PROBLEM — A41.9 SEPSIS: Status: RESOLVED | Noted: 2020-02-26 | Resolved: 2020-03-03

## 2020-03-03 LAB
ANION GAP SERPL CALC-SCNC: 8 MMOL/L (ref 8–16)
BASOPHILS # BLD AUTO: 0.04 K/UL (ref 0–0.2)
BASOPHILS NFR BLD: 0.4 % (ref 0–1.9)
BUN SERPL-MCNC: 40 MG/DL (ref 8–23)
CALCIUM SERPL-MCNC: 7.8 MG/DL (ref 8.7–10.5)
CHLORIDE SERPL-SCNC: 105 MMOL/L (ref 95–110)
CO2 SERPL-SCNC: 20 MMOL/L (ref 23–29)
CREAT SERPL-MCNC: 2.7 MG/DL (ref 0.5–1.4)
DIFFERENTIAL METHOD: ABNORMAL
EOSINOPHIL # BLD AUTO: 0.2 K/UL (ref 0–0.5)
EOSINOPHIL NFR BLD: 1.9 % (ref 0–8)
ERYTHROCYTE [DISTWIDTH] IN BLOOD BY AUTOMATED COUNT: 13 % (ref 11.5–14.5)
EST. GFR  (AFRICAN AMERICAN): 27 ML/MIN/1.73 M^2
EST. GFR  (NON AFRICAN AMERICAN): 24 ML/MIN/1.73 M^2
GLUCOSE SERPL-MCNC: 295 MG/DL (ref 70–110)
HCT VFR BLD AUTO: 35.6 % (ref 40–54)
HGB BLD-MCNC: 12.1 G/DL (ref 14–18)
IMM GRANULOCYTES # BLD AUTO: 0.1 K/UL (ref 0–0.04)
IMM GRANULOCYTES NFR BLD AUTO: 0.9 % (ref 0–0.5)
LYMPHOCYTES # BLD AUTO: 1.9 K/UL (ref 1–4.8)
LYMPHOCYTES NFR BLD: 16.7 % (ref 18–48)
MCH RBC QN AUTO: 28.9 PG (ref 27–31)
MCHC RBC AUTO-ENTMCNC: 34 G/DL (ref 32–36)
MCV RBC AUTO: 85 FL (ref 82–98)
MONOCYTES # BLD AUTO: 0.7 K/UL (ref 0.3–1)
MONOCYTES NFR BLD: 6.4 % (ref 4–15)
NEUTROPHILS # BLD AUTO: 8.3 K/UL (ref 1.8–7.7)
NEUTROPHILS NFR BLD: 73.7 % (ref 38–73)
NRBC BLD-RTO: 0 /100 WBC
PLATELET # BLD AUTO: 197 K/UL (ref 150–350)
PMV BLD AUTO: 10.8 FL (ref 9.2–12.9)
POCT GLUCOSE: 238 MG/DL (ref 70–110)
POCT GLUCOSE: 308 MG/DL (ref 70–110)
POCT GLUCOSE: 341 MG/DL (ref 70–110)
POTASSIUM SERPL-SCNC: 4.2 MMOL/L (ref 3.5–5.1)
RBC # BLD AUTO: 4.18 M/UL (ref 4.6–6.2)
SODIUM SERPL-SCNC: 133 MMOL/L (ref 136–145)
WBC # BLD AUTO: 11.24 K/UL (ref 3.9–12.7)

## 2020-03-03 PROCEDURE — 80048 BASIC METABOLIC PNL TOTAL CA: CPT

## 2020-03-03 PROCEDURE — 25000003 PHARM REV CODE 250: Performed by: FAMILY MEDICINE

## 2020-03-03 PROCEDURE — 25000003 PHARM REV CODE 250: Performed by: HOSPITALIST

## 2020-03-03 PROCEDURE — 36415 COLL VENOUS BLD VENIPUNCTURE: CPT

## 2020-03-03 PROCEDURE — 97116 GAIT TRAINING THERAPY: CPT | Mod: CQ

## 2020-03-03 PROCEDURE — 97530 THERAPEUTIC ACTIVITIES: CPT | Mod: CQ

## 2020-03-03 PROCEDURE — 97535 SELF CARE MNGMENT TRAINING: CPT | Mod: CO

## 2020-03-03 PROCEDURE — 63600175 PHARM REV CODE 636 W HCPCS: Performed by: HOSPITALIST

## 2020-03-03 PROCEDURE — 85025 COMPLETE CBC W/AUTO DIFF WBC: CPT

## 2020-03-03 PROCEDURE — 94761 N-INVAS EAR/PLS OXIMETRY MLT: CPT

## 2020-03-03 RX ORDER — METRONIDAZOLE 500 MG/1
500 TABLET ORAL EVERY 8 HOURS
Qty: 15 TABLET | Refills: 0 | Status: SHIPPED | OUTPATIENT
Start: 2020-03-03 | End: 2020-03-08

## 2020-03-03 RX ADMIN — APIXABAN 2.5 MG: 2.5 TABLET, FILM COATED ORAL at 09:03

## 2020-03-03 RX ADMIN — INSULIN ASPART 8 UNITS: 100 INJECTION, SOLUTION INTRAVENOUS; SUBCUTANEOUS at 04:03

## 2020-03-03 RX ADMIN — INSULIN ASPART 4 UNITS: 100 INJECTION, SOLUTION INTRAVENOUS; SUBCUTANEOUS at 09:03

## 2020-03-03 RX ADMIN — METRONIDAZOLE 500 MG: 500 TABLET, FILM COATED ORAL at 02:03

## 2020-03-03 RX ADMIN — INSULIN DETEMIR 15 UNITS: 100 INJECTION, SOLUTION SUBCUTANEOUS at 09:03

## 2020-03-03 RX ADMIN — PANCRELIPASE 1 CAPSULE: 36000; 180000; 114000 CAPSULE, DELAYED RELEASE PELLETS ORAL at 09:03

## 2020-03-03 RX ADMIN — PANCRELIPASE 1 CAPSULE: 36000; 180000; 114000 CAPSULE, DELAYED RELEASE PELLETS ORAL at 04:03

## 2020-03-03 RX ADMIN — METRONIDAZOLE 500 MG: 500 TABLET, FILM COATED ORAL at 05:03

## 2020-03-03 RX ADMIN — INSULIN ASPART 5 UNITS: 100 INJECTION, SOLUTION INTRAVENOUS; SUBCUTANEOUS at 08:03

## 2020-03-03 RX ADMIN — INSULIN ASPART 5 UNITS: 100 INJECTION, SOLUTION INTRAVENOUS; SUBCUTANEOUS at 04:03

## 2020-03-03 NOTE — ASSESSMENT & PLAN NOTE
On   levemir  Low dose SSI  accucheck  Diabetic diet  3/2     Recent Labs   Lab 03/01/20  1317 03/01/20  1721 03/01/20  1932 03/02/20  0556 03/02/20  1136 03/02/20  1730   POCTGLUCOSE 254* 298* 359* 280* 405* 344*   Elevated, pt is eating better, will need to adjust insulin     3/2 Dc home on meds, no changes during this admission

## 2020-03-03 NOTE — PLAN OF CARE
CM discussed case with Nephrology, Cr improved, stable for PICC line.  Discussed with pt and wife via telephone, both feel comfortable managing home IV antibiotics for duration of course.  Notified primary staff, will await order.      Referral to [facility] for home antibiotics, PHN to assign home health agency once orders written.  Leon will contact family for teaching, wife is available tomorrow.    CM left message with surgery team to discuss management of antibiotics and labs, ID has not been consulted.  Will follow and assist as needed.      Message to surgery clinic with notification of d/c plans and request for f/u appt when appropriate.  Will await appt.    Spoke with Kena with DR Koroma's office, pt will f/u with DR Rowe in surgery clinic.  Team will notify this CM if Soledad will manage IV antibiotics as well.    Kelin Franz, RN    [phone]

## 2020-03-03 NOTE — SUBJECTIVE & OBJECTIVE
Interval History: continue PT/OT, continue monitoring cr, level at 3.4  Review of Systems   Constitutional: Negative for activity change, chills and fever.   Respiratory: Negative for shortness of breath.    Cardiovascular: Negative for chest pain.   Gastrointestinal: Negative for nausea and vomiting. Abdominal pain: at the drain site.     Objective:     Vital Signs (Most Recent):  Temp: 97.2 °F (36.2 °C) (03/02/20 1735)  Pulse: 77 (03/02/20 1735)  Resp: 17 (03/02/20 1735)  BP: 138/73 (03/02/20 1735)  SpO2: 99 % (03/02/20 1735) Vital Signs (24h Range):  Temp:  [96.8 °F (36 °C)-98.9 °F (37.2 °C)] 97.2 °F (36.2 °C)  Pulse:  [67-80] 77  Resp:  [17-18] 17  SpO2:  [96 %-99 %] 99 %  BP: (120-138)/(56-73) 138/73     Weight: 115.5 kg (254 lb 10.1 oz)  Body mass index is 39.88 kg/m².    Intake/Output Summary (Last 24 hours) at 3/2/2020 1834  Last data filed at 3/2/2020 1800  Gross per 24 hour   Intake 2265 ml   Output 3100 ml   Net -835 ml      Physical Exam   Constitutional: He is oriented to person, place, and time. He appears well-developed and well-nourished.   HENT:   Head: Normocephalic and atraumatic.   Neck: Normal range of motion. Neck supple.   Cardiovascular: Normal rate, regular rhythm and normal heart sounds. Exam reveals no gallop and no friction rub.   No murmur heard.  Pulmonary/Chest: Effort normal and breath sounds normal. No respiratory distress.   Abdominal: Soft. Bowel sounds are normal. There is no tenderness.   Musculoskeletal: Normal range of motion. He exhibits no edema or tenderness.   Neurological: He is alert and oriented to person, place, and time.   Skin: Skin is warm.   Psychiatric: He has a normal mood and affect. His speech is normal and behavior is normal. Thought content normal. His mood appears not anxious. Cognition and memory are normal. He does not exhibit a depressed mood.       Significant Labs:   Bilirubin:   Recent Labs   Lab 02/27/20  0335 02/28/20  0427 02/29/20  0443  03/01/20 0456 03/02/20  0601   BILITOT 0.6 0.4 0.4 0.3 0.3     No results for input(s): LACTATE in the last 48 hours.  Lipase:   No results for input(s): LIPASE in the last 48 hours.  Magnesium:   No results for input(s): MG in the last 48 hours.  Recent Labs   Lab 02/27/20  0059 02/27/20  0335 02/28/20 0427   WBC 22.90* 23.44* 12.54   HGB 12.6* 12.6* 11.1*   HCT 37.4* 37.4* 32.9*    157 125*     Recent Labs   Lab 02/25/20  0618  02/28/20  0427 02/28/20  1210 02/29/20 0443 03/01/20  0456 03/02/20  0601   *   < > 134*  134* 133* 136  136 136 135*   K 4.7   < > 3.8  3.8 3.9 3.8  3.8 3.7 3.9      < > 105  105 104 107  107 106 107   CO2 19*   < > 20*  20* 20* 23  23 23 20*   BUN 23   < > 55*  55* 55* 58*  58* 51* 47*   CREATININE 1.4   < > 3.4*  3.4* 3.4* 3.5*  3.5* 3.5* 3.4*   *   < > 216*  216* 295* 128*  128* 137* 286*   CALCIUM 7.8*   < > 7.4*  7.4* 7.8* 7.9*  7.9* 7.8* 7.8*   MG 1.7   < > 2.1 2.1 2.3  --   --    PHOS 2.3*   < > 3.9  --  4.1 4.3 3.9   LIPASE 4  --   --   --   --   --   --     < > = values in this interval not displayed.     Recent Labs   Lab 02/25/20  1826  02/29/20 0443 03/01/20 0456 03/02/20  0601   ALKPHOS  --    < > 183* 202* 260*   ALT  --    < > 15 13 23   AST  --    < > 11 13 40   ALBUMIN  --    < > 2.0* 1.9* 1.9*   PROT  --    < > 5.1* 4.8* 4.9*   BILITOT  --    < > 0.4 0.3 0.3   INR 1.0  --   --   --   --     < > = values in this interval not displayed.      No results for input(s): CPK, CPKMB, MB, TROPONINI in the last 72 hours.  Recent Labs   Lab 03/01/20  1317 03/01/20  1721 03/01/20  1932 03/02/20  0556 03/02/20  1136 03/02/20  1730   POCTGLUCOSE 254* 298* 359* 280* 405* 344*     Hemoglobin A1C   Date Value Ref Range Status   02/23/2020 12.4 (H) 4.0 - 5.6 % Final     Comment:     ADA Screening Guidelines:  5.7-6.4%  Consistent with prediabetes  >or=6.5%  Consistent with diabetes  High levels of fetal hemoglobin interfere with the  HbA1C  assay. Heterozygous hemoglobin variants (HbS, HgC, etc)do  not significantly interfere with this assay.   However, presence of multiple variants may affect accuracy.     02/21/2020 12.5 (H) 4.0 - 5.6 % Final     Comment:     ADA Screening Guidelines:  5.7-6.4%  Consistent with prediabetes  >or=6.5%  Consistent with diabetes  High levels of fetal hemoglobin interfere with the HbA1C  assay. Heterozygous hemoglobin variants (HbS, HgC, etc)do  not significantly interfere with this assay.   However, presence of multiple variants may affect accuracy.     11/15/2019 9.5 (H) 4.0 - 5.6 % Final     Comment:     ADA Screening Guidelines:  5.7-6.4%  Consistent with prediabetes  >or=6.5%  Consistent with diabetes  High levels of fetal hemoglobin interfere with the HbA1C  assay. Heterozygous hemoglobin variants (HbS, HgC, etc)do  not significantly interfere with this assay.   However, presence of multiple variants may affect accuracy.       Scheduled Meds:   apixaban  2.5 mg Oral BID    ceFEPime (MAXIPIME) IVPB  2 g Intravenous Q24H    insulin detemir U-100  15 Units Subcutaneous Daily    lipase-protease-amylase  1 capsule Oral TID    metroNIDAZOLE  500 mg Oral Q8H     Continuous Infusions:    As Needed: acetaminophen, calcium carbonate, Dextrose 10% Bolus, Dextrose 10% Bolus, Dextrose 10% Bolus, Dextrose 10% Bolus, diphenhydrAMINE, glucagon (human recombinant), glucose, glucose, HYDROcodone-acetaminophen, ibuprofen, insulin aspart U-100, melatonin, midodrine, ondansetron, ondansetron, pneumoc 13-sugey conj-dip cr(PF), sodium chloride 0.9%       Significant Imaging:  No new imaging

## 2020-03-03 NOTE — PLAN OF CARE
VN rounds: VN cued into pt's room with pt's permission. Pt sitting up in chair and stated he just worked with therapy this morning. VN instructed pt to call for assistance. Pt aware and agreeable. Allowed time for questions. Denies pain. NAD noted. Will cont to be available as needed.

## 2020-03-03 NOTE — DISCHARGE SUMMARY
[facility] Medicine  Discharge Summary      Patient Name: Jian Arrieta  MRN: [id number]  Admission Date: 2/23/2020  Hospital Length of Stay: 6 days  Discharge Date and Time: No discharge date for patient encounter.  Attending Physician: Tien Arboleda DO   Discharging Provider: Tien Arboleda DO  Primary Care Provider: Leon Barajas DO      HPI:   Jian Arrieta is a 65 y/o M with h/o CAD, chronic pancreatitis, HTN, chronic Lymphedema, former smoker, morbid obesity, DM II, CABG in x3 years ago (2017), last stents were placed x2 years ago (2018). present with 2 days history of chest heaviness and pain located in the epigastric area, pain is about 2-3 radiating to the back. Pain is exacerbated with exertion like riding lawnmower. There is an associated right flank pain. He denies chills, fever, nausea, or vomiting.     Procedure(s) (LRB):  ULTRASOUND, UPPER GI TRACT, ENDOSCOPIC (N/A)      Hospital Course:   2/27 pt seen, GI had recommended an MRCP, but the pt already had a Cholecystostomy tube placement this mormning, will hold off on the MRCP for now. Ok to provide 1/2 NS  2/28 pt seen, feeling better today,  at site of drain. sug adjusted his abx. Will place on midodrine low dose to prevent the swings and needs for pressors  2/29  Pt is feeling better, more interacting, better urine output, not complaining of pain, can be transferred to tele floor today  3/1 pt seen, feeling much better than prior days. Will continue current abx, and LR and add supp given his low albumin  3/2 pt seen, improving, PT/OT recommended HH, renal is following, till cr elevated, will continue current management  3/3 pt seen discussed case qwith Dr maynard , no need for more antibiotics we can continue flagyl for few days and then stop. We can dc the pt home with F/U with surg, nephrology and HH.  Cleared by nephrology for discharge as well     Consults:   Consults (From admission, onward)         Status Ordering Provider     Inpatient consult to Anesthesiology  Once     Provider:  Leon Barajas DO    Acknowledged CHANDRAKANT SANDRA K.     Inpatient consult to Cardiology[facility]  Once     Provider:  (Not yet assigned)    Completed INNOCENT-ITMARCY, RAJAN N.     Inpatient Consult to Critical Care  Once     Provider:  (Not yet assigned)    Acknowledged INNOCENT-ITMARCY RAJAN N.     Inpatient consult to General Surgery  Once     Provider:  SON Rowe MD    Completed INNOCENT-ITSHERYL VIDALEOMA N.     Inpatient consult to Interventional Radiology  Once     Provider:  (Not yet assigned)    Completed YG GRISSOM     Inpatient consult to Nephrology[facility] (Jamilah Gonzalez, [place])  Once     Provider:  (Not yet assigned)    Completed CHANDRAKANT SANDRA.     Inpatient consult to Registered Dietitian/Nutritionist  Once     Provider:  (Not yet assigned)    Completed STUART GUTHRIE     Inpatient consult to [facility]  Once     Provider:  (Not yet assigned)    Acknowledged SILVER GUTHRIEI     Inpatient consult to Social Work  Once     Provider:  (Not yet assigned)    Acknowledged INNOCENT-ITMARCY, RAJAN N.     Inpatient consult to Social Work  Once     Provider:  (Not yet assigned)    Acknowledged HAWAWINI STUART     Inpatient consult to Social Work  Once     Provider:  (Not yet assigned)    Acknowledged STUART GUTHRIE          Cholecystitis  2/27  GI had recommended an MRCP, but the pt already had a Cholecystostomy tube placement this mormning,   Continue symptomatic management  Off pressors  2/28 add midodrine for his low BP.  Routine Gram stain awilda bottle: Gram negative rods P   Blood Culture, Routine Results called to and read back by: Kylah Bragg RN  02/26/2020   P   Blood Culture, Routine 06:34 P   Blood Culture, Routine Gram stain aer bottle: Gram negative rods P   Blood Culture, Routine Positive results previously called 02/26/2020  23:54 P   Blood Culture, Routine ESCHERICHIA  COLIAbnormal  P   Resulting Agency [facility]   Susceptibility      Escherichia coli     CULTURE, BLOOD (Preliminary)     Amikacin <=8 mcg/mL Sensitive     Amox/K Clav'ate <=8/4 mcg/mL Sensitive     Amp/Sulbactam 16/8 mcg/mL Intermediate     Ampicillin >16 mcg/mL Resistant     Cefazolin 4 mcg/mL Sensitive     Ceftriaxone <=1 mcg/mL Sensitive     Ciprofloxacin >2 mcg/mL Resistant     Ertapenem <=0.5 mcg/mL Sensitive     Gentamicin >8 mcg/mL Resistant     Levofloxacin >4 mcg/mL Resistant     Meropenem <=1 mcg/mL Sensitive     Piperacillin/Tazo <=8 mcg/mL Sensitive     Tetracycline <=2 mcg/mL Sensitive     Tobramycin >8 mcg/mL Resistant     Trimeth/Sulfa <=0.5/9.5 m... Sensitive                2/29 pt is being treated for ecoli and prvencia.  Ok to transfer to [facility], off pressors  3/1 comfortable, continue current POC  3/2 discuss with gen surg about dc planning with f/u in clinic for dtainainge removal.  3/2 Dc home on meds, no changes during this admission  F/u with gen surg in a week,  HH for drain management      Chronic kidney disease, stage 3  3/1 add nepro for alb of 1.9  3/2 Dc home on meds, no changes during this admission  F/u with nephrology as outpatient    Chronic acquired lymphedema  chronic      Type 2 diabetes mellitus with diabetic neuropathy, with long-term current use of insulin  On   levemir  Low dose SSI  accucheck  Diabetic diet  3/2     Recent Labs   Lab 03/01/20  1317 03/01/20  1721 03/01/20  1932 03/02/20  0556 03/02/20  1136 03/02/20  1730   POCTGLUCOSE 254* 298* 359* 280* 405* 344*   Elevated, pt is eating better, will need to adjust insulin     3/2 Dc home on meds, no changes during this admission    Coronary artery disease due to calcified coronary lesion  2/28 restart Eliquis  3/2 Dc home on meds, no changes during this admission      Final Active Diagnoses:    Diagnosis Date Noted POA    Cholecystitis [K81.9]  Unknown    Bacteremia [R78.81] 02/26/2020 Yes    Chronic kidney disease, stage 3  [N18.3] 07/23/2019 Yes     Chronic    Chronic acquired lymphedema [I89.0] 06/30/2019 Yes     Chronic    Type 2 diabetes mellitus with diabetic neuropathy, with long-term current use of insulin [E11.40, Z79.4] 02/04/2019 Not Applicable     Chronic    Chronic diastolic heart failure [I50.32] 01/29/2019 Yes     Chronic    Coronary artery disease due to calcified coronary lesion [I25.10, I25.84] 05/08/2015 Yes     Chronic      Problems Resolved During this Admission:    Diagnosis Date Noted Date Resolved POA    PRINCIPAL PROBLEM:  Sepsis [A41.9] 02/26/2020 03/03/2020 Yes    Chronic pancreatitis [K86.1] 01/28/2019 03/01/2020 Yes     Chronic    Dilation of biliary tract [K83.8] 02/24/2020 02/29/2020 Yes    Chest pain [R07.9] 02/23/2020 02/29/2020 Yes    Hyperglycemia [R73.9] 02/23/2020 02/28/2020 Yes    Right lower quadrant abdominal pain [R10.31] 02/23/2020 02/28/2020 Yes    Fatty liver disease, nonalcoholic [K76.0] 05/09/2019 02/27/2020 Yes     Chronic    ELIEZER (acute kidney injury) [N17.9] 03/13/2019 03/03/2020 Yes       Discharged Condition: stable    Disposition: Home or Self Care    Follow Up:  Follow-up Information     J Po Rowe MD On 3/13/2020.    Specialty:  General Surgery  Why:  11:00 AM  Contact information:  [address]  [address]  [place] [address]  [phone]             Chanda Hanley MD On 3/9/2020.    Specialty:  Hospitalist  Why:  10:00 AM  Contact information:  [address]  [address]  [place] [address]  [phone]             Leon Barajas DO In 3 days.    Specialty:  Internal Medicine  Contact information:  [address] [facility]  [place] [address]  [phone]             Mykel Miller MD In 1 week.    Specialty:  General Surgery  Contact information:  [address]  [address]  [place] [address]  [phone]                 Patient Instructions:      Ambulatory referral/consult to Nephrology   Standing Status: Future   Referral  Priority: Routine Referral Type: Consultation   Referral Reason: Specialty Services Required   Requested Specialty: Nephrology   Number of Visits Requested: 1     Diet Cardiac     Diet renal     Diet diabetic     Notify your health care provider if you experience any of the following:  temperature >100.4     Notify your health care provider if you experience any of the following:  persistent nausea and vomiting or diarrhea     Notify your health care provider if you experience any of the following:  severe uncontrolled pain     Notify your health care provider if you experience any of the following:  redness, tenderness, or signs of infection (pain, swelling, redness, odor or green/yellow discharge around incision site)     Notify your health care provider if you experience any of the following:  difficulty breathing or increased cough     Notify your health care provider if you experience any of the following:  severe persistent headache     Notify your health care provider if you experience any of the following:  persistent dizziness, light-headedness, or visual disturbances     Notify your health care provider if you experience any of the following:  worsening rash     Notify your health care provider if you experience any of the following:  increased confusion or weakness     Notify your health care provider if you experience any of the following:   Order Comments: Malfunction of the biliary drain     Change dressing (specify)   Order Comments: Drainage care for the biliary drain.  Do n ot remove.  Empty the drain teice a day or as needed.   Contact surgery service Fr Rowe or Dr Hogue for further needs as needed.     Activity as tolerated       Significant Diagnostic Studies: Labs:   BMP:   Recent Labs   Lab 03/02/20  0601 03/03/20  1203   * 295*   * 133*   K 3.9 4.2    105   CO2 20* 20*   BUN 47* 40*   CREATININE 3.4* 2.7*   CALCIUM 7.8* 7.8*   , CMP   Recent Labs   Lab  03/02/20  0601 03/03/20  1203   * 133*   K 3.9 4.2    105   CO2 20* 20*   * 295*   BUN 47* 40*   CREATININE 3.4* 2.7*   CALCIUM 7.8* 7.8*   PROT 4.9*  --    ALBUMIN 1.9*  --    BILITOT 0.3  --    ALKPHOS 260*  --    AST 40  --    ALT 23  --    ANIONGAP 8 8   ESTGFRAFRICA 21* 27*   EGFRNONAA 18* 24*   , CBC   Recent Labs   Lab 03/03/20  1203   WBC 11.24   HGB 12.1*   HCT 35.6*       and Lipid Panel   Lab Results   Component Value Date    CHOL 143 02/21/2020    HDL 49 02/21/2020    LDLCALC 76.4 02/21/2020    TRIG 88 02/21/2020    CHOLHDL 34.3 02/21/2020     Microbiology:   Blood Culture   Lab Results   Component Value Date    LABBLOO Gram stain awilda bottle: Gram negative rods 02/25/2020    LABBLOO  02/25/2020     Results called to and read back by: Kylah Bragg RN  02/26/2020      LABBLOO 06:34 02/25/2020    LABBLOO Gram stain aer bottle: Gram negative rods 02/25/2020    LABBLOO  02/25/2020     Positive results previously called 02/26/2020  23:54    LABBLOO ESCHERICHIA COLI (A) 02/25/2020       Pending Diagnostic Studies:     Procedure Component Value Units Date/Time    FL ERCP Biliary And Pancreatic [[id number]]     Order Status:  Sent Lab Status:  No result          Medications:  Reconciled Home Medications:      Medication List      START taking these medications    metroNIDAZOLE 500 MG tablet  Commonly known as:  FLAGYL  Take 1 tablet (500 mg total) by mouth every 8 (eight) hours. for 5 days        CONTINUE taking these medications    apixaban 5 mg Tab  Commonly known as:  ELIQUIS  Take 1 tablet (5 mg total) by mouth 2 (two) times daily.     atorvastatin 40 MG tablet  Commonly known as:  LIPITOR  Take 40 mg by mouth once daily.     blood sugar diagnostic Strp  Test strips to use with meter covered by insurance to check blood sugars twice daily. Insulin dependent diabetic.     blood-glucose meter Misc  Glucometer covered by insurance to check blood sugars at home.  Insulin dependent  "diabetic.     cyanocobalamin (vitamin B-12) 500 mcg Subl  Place 1 tablet under the tongue every Monday.     FEOSOL ORAL  Take 325 mg by mouth once daily.     furosemide 20 MG tablet  Commonly known as:  LASIX  Take 1 tablet (20 mg total) by mouth 2 (two) times daily.     glucagon (human recombinant) 1 mg Solr  Commonly known as:  Glucagon Emergency Kit (human)  Inject 1 mg into the muscle as needed (For severely low sugar).     insulin aspart U-100 100 unit/mL injection  Commonly known as:  NOVOLOG  Inject 8 Units into the skin 3 (three) times daily before meals.     insulin detemir U-100 100 unit/mL (3 mL) Inpn pen  Commonly known as:  LEVEMIR FLEXTOUCH  Inject 20 Units into the skin every evening.     lancets Misc  Lancets to use with meter covered by insurance to check blood sugars twice daily.  Insulin dependent diabetic.     lipase-protease-amylase 36,000-114,000- 180,000 unit Cpdr  Commonly known as:  Creon  Take 1 capsule by mouth 3 (three) times daily.     metoprolol succinate 25 MG 24 hr tablet  Commonly known as:  TOPROL-XL  Take 1 tablet (25 mg total) by mouth once daily.     pen needle, diabetic 32 gauge x 1/4" Ndle  Commonly known as:  Insupen  1 each by Misc.(Non-Drug; Combo Route) route 4 (four) times daily.            Indwelling Lines/Drains at time of discharge:   Lines/Drains/Airways     Central Venous Catheter Line            Percutaneous Central Line Insertion/Assessment - Triple Lumen  02/26/20 2319 right internal jugular 5 days          Drain                 Biliary Tube 02/27/20 1229 8 Fr. RUQ 5 days                Time spent on the discharge of patient: 45 minutes  Patient was seen and examined on the date of discharge and determined to be suitable for discharge.         Tien Arboleda DO  Department of Hospital Medicine  [facility]  "

## 2020-03-03 NOTE — ASSESSMENT & PLAN NOTE
C2/27 hronic kidney disease, stage 3  Renally dose medication  Continue IVF   Cr up to 3 from 2 and 2.5, gentle hydration  2/28 cr up yo 3.4 appreciate nephro consult  2/29 appreciate renal inpput, treating and monitoring ATN response  Continue LR at 50 cc /hr  3/1 cr stable x 3 days at 3.5  3/2 cr still elevated

## 2020-03-03 NOTE — ASSESSMENT & PLAN NOTE
2/27  GI had recommended an MRCP, but the pt already had a Cholecystostomy tube placement this mormning,   Continue symptomatic management  Off pressors  2/28 add midodrine for his low BP.  Routine Gram stain awilda bottle: Gram negative rods P   Blood Culture, Routine Results called to and read back by: Kylah Bragg RN  02/26/2020   P   Blood Culture, Routine 06:34 P   Blood Culture, Routine Gram stain aer bottle: Gram negative rods P   Blood Culture, Routine Positive results previously called 02/26/2020  23:54 P   Blood Culture, Routine ESCHERICHIA COLIAbnormal  P   Resulting Agency [facility]   Susceptibility      Escherichia coli     CULTURE, BLOOD (Preliminary)     Amikacin <=8 mcg/mL Sensitive     Amox/K Clav'ate <=8/4 mcg/mL Sensitive     Amp/Sulbactam 16/8 mcg/mL Intermediate     Ampicillin >16 mcg/mL Resistant     Cefazolin 4 mcg/mL Sensitive     Ceftriaxone <=1 mcg/mL Sensitive     Ciprofloxacin >2 mcg/mL Resistant     Ertapenem <=0.5 mcg/mL Sensitive     Gentamicin >8 mcg/mL Resistant     Levofloxacin >4 mcg/mL Resistant     Meropenem <=1 mcg/mL Sensitive     Piperacillin/Tazo <=8 mcg/mL Sensitive     Tetracycline <=2 mcg/mL Sensitive     Tobramycin >8 mcg/mL Resistant     Trimeth/Sulfa <=0.5/9.5 m... Sensitive                2/29 pt is being treated for ecoli and prvencia.  Ok to transfer to [facility], off pressors  3/1 comfortable, continue current POC  3/2 discuss with gen surg about dc planning with f/u in clinic for dtainainge removal.  3/2 Dc home on meds, no changes during this admission  F/u with gen surg in a week,  HH for drain management

## 2020-03-03 NOTE — PLAN OF CARE
[facility] HOME HEALTH ORDERS  FACE TO FACE ENCOUNTER    Patient Name: Jian Arrieta  YOB: 1955    PCP: Leon Barajas DO   PCP Address: [address] [facility] / [place] [address]  PCP Phone Number: [phone]  PCP Fax: [phone]    Encounter Date: 03/03/2020    Admit to Home Health    Diagnoses:  Active Hospital Problems    Diagnosis  POA    Cholecystitis [K81.9]  Unknown     Priority: 1 - High    Bacteremia [R78.81]  Yes    Chronic kidney disease, stage 3 [N18.3]  Yes     Chronic    Chronic acquired lymphedema [I89.0]  Yes     Chronic    Type 2 diabetes mellitus with diabetic neuropathy, with long-term current use of insulin [E11.40, Z79.4]  Not Applicable     Chronic    Chronic diastolic heart failure [I50.32]  Yes     Chronic     1-28-19  Left Ventricle Moderate decreased ejection fraction at 30%. Normal left ventricular cavity size. Normal wall thickness observed. Grade I (mild) left ventricular diastolic dysfunction consistent with impaired relaxation. Normal left atrial pressure. Moderate, global hypokinesis(see wall scoring diagram).   Right Ventricle Normal cavity size, wall thickness and ejection fraction. Wall motion normal.   Left Atrium Normal atrial size. .   Right Atrium Normal atrial size.   Aortic Valve The valve is trileaflet. Aortic valve sclerosis is mild. No stenosis. No regurgitation. Mobility is normal   Mitral Valve Normal valve structure. No stenosis. No regurgitation. Normal leaflet mobility.   Tricuspid Valve Tricuspid valve not well visualized. Trace regurgitation.   Pulmonic Valve The pulmonic valve was not well visualized.   IVC/SVC Inferior vena cava is not well visualized.   Ascending Aorta The aortic root and ascending aorta are normal in size.   Pericardium No pericardial effusion.           Coronary artery disease due to calcified coronary lesion [I25.10, I25.84]  Yes     Chronic     5 stents on ASA          Resolved  Hospital Problems    Diagnosis Date Resolved POA    *Sepsis [A41.9] 03/03/2020 Yes    Chronic pancreatitis [K86.1] 03/01/2020 Yes     Priority: 2      Chronic    Dilation of biliary tract [K83.8] 02/29/2020 Yes    Chest pain [R07.9] 02/29/2020 Yes    Hyperglycemia [R73.9] 02/28/2020 Yes    Right lower quadrant abdominal pain [R10.31] 02/28/2020 Yes    Fatty liver disease, nonalcoholic [K76.0] 02/27/2020 Yes     Chronic    ELIEZER (acute kidney injury) [N17.9] 03/03/2020 Yes       Future Appointments   Date Time Provider Department Center   3/4/2020  8:00 AM CGMS, ENDOCRINE [facility] ENDODIA [facility]   3/6/2020  2:30 PM Huan Carney MD [facility] ENDODIA [facility]   3/9/2020 10:00 AM Chanda Hanley MD [facility] IMPRI [facility]   3/13/2020 11:00 AM SON Rowe MD [facility] TUMOR [facility]     Follow-up Information     RANDELL Rowe MD On 3/13/2020.    Specialty:  General Surgery  Why:  11:00 AM  Contact information:  [address]  [address]  [place] [address]  [phone]             Chanda Hanley MD On 3/9/2020.    Specialty:  Hospitalist  Why:  10:00 AM  Contact information:  [address]  [address]  [place] [address]  [phone]             Leon Barajas DO In 3 days.    Specialty:  Internal Medicine  Contact information:  [address] [facility]  [place] [address]  [phone]             Mykel Miller MD In 1 week.    Specialty:  General Surgery  Contact information:  [address]  [address]  [place] [address]  [phone]                     I have seen and examined this patient face to face today. My clinical findings that support the need for the home health skilled services and home bound status are the following:  Weakness/numbness causing balance and gait disturbance due to Infection making it taxing to leave home.    Allergies:Review of patient's allergies indicates:  No Known Allergies    Diet: cardiac diet, diabetic diet: 1800 calorie and renal  diet    Activities: activity as tolerated    Nursing:   SN to complete comprehensive assessment including routine vital signs. Instruct on disease process and s/s of complications to report to MD. Review/verify medication list sent home with the patient at time of discharge  and instruct patient/caregiver as needed. Frequency may be adjusted depending on start of care date.    Notify MD if SBP > 160 or < 90; DBP > 90 or < 50; HR > 120 or < 50; Temp > 101; Other:   Malfunction biliary drain      CONSULTS:    Physical Therapy to evaluate and treat. Evaluate for home safety and equipment needs; Establish/upgrade home exercise program. Perform / instruct on therapeutic exercises, gait training, transfer training, and Range of Motion.  Occupational Therapy to evaluate and treat. Evaluate home environment for safety and equipment needs. Perform/Instruct on transfers, ADL training, ROM, and therapeutic exercises.  Speech Therapy  to evaluate and treat for  Swallowing.  Aide to provide assistance with personal care, ADLs, and vital signs.    MISCELLANEOUS CARE:  Wound Care Orders:  help with biliary drain management and emtying, and overasll care.    WOUND CARE ORDERS  n/a      Medications: Review discharge medications with patient and family and provide education.      Current Discharge Medication List      START taking these medications    Details   metroNIDAZOLE (FLAGYL) 500 MG tablet Take 1 tablet (500 mg total) by mouth every 8 (eight) hours. for 5 days  Qty: 15 tablet, Refills: 0         CONTINUE these medications which have NOT CHANGED    Details   apixaban (ELIQUIS) 5 mg Tab Take 1 tablet (5 mg total) by mouth 2 (two) times daily.  Qty: 60 tablet, Refills: 11      atorvastatin (LIPITOR) 40 MG tablet Take 40 mg by mouth once daily.      cyanocobalamin, vitamin B-12, 500 mcg Subl Place 1 tablet under the tongue every Monday.       ferrous sulfate (FEOSOL ORAL) Take 325 mg by mouth once daily.       furosemide (LASIX)  "20 MG tablet Take 1 tablet (20 mg total) by mouth 2 (two) times daily.  Qty: 60 tablet, Refills: 11      insulin aspart U-100 (NOVOLOG) 100 unit/mL injection Inject 8 Units into the skin 3 (three) times daily before meals.       insulin detemir U-100 (LEVEMIR FLEXTOUCH) 100 unit/mL (3 mL) SubQ InPn pen Inject 20 Units into the skin every evening.  Qty: 3 mL, Refills: 12      blood sugar diagnostic Strp Test strips to use with meter covered by insurance to check blood sugars twice daily. Insulin dependent diabetic.  Qty: 200 strip, Refills: 11    Associated Diagnoses: Type 2 diabetes mellitus without complication, with long-term current use of insulin      blood-glucose meter Misc Glucometer covered by insurance to check blood sugars at home.  Insulin dependent diabetic.  Qty: 1 each, Refills: 0    Associated Diagnoses: Type 2 diabetes mellitus without complication, with long-term current use of insulin      glucagon, human recombinant, (GLUCAGON EMERGENCY KIT, HUMAN,) 1 mg SolR Inject 1 mg into the muscle as needed (For severely low sugar).  Qty: 1 each, Refills: 2    Associated Diagnoses: Type 2 diabetes mellitus with stage 3 chronic kidney disease, without long-term current use of insulin      lancets Misc Lancets to use with meter covered by insurance to check blood sugars twice daily.  Insulin dependent diabetic.  Qty: 200 each, Refills: 11    Associated Diagnoses: Type 2 diabetes mellitus without complication, with long-term current use of insulin      lipase-protease-amylase (CREON) 36,000-114,000- 180,000 unit CpDR Take 1 capsule by mouth 3 (three) times daily.  Qty: 270 capsule, Refills: 3    Associated Diagnoses: Alcohol-induced chronic pancreatitis      metoprolol succinate (TOPROL-XL) 25 MG 24 hr tablet Take 1 tablet (25 mg total) by mouth once daily.  Qty: 30 tablet, Refills: 11      pen needle, diabetic (INSUPEN) 32 gauge x 1/4" Ndle 1 each by Misc.(Non-Drug; Combo Route) route 4 (four) times " daily.  Qty: 360 each, Refills: 3             I certify that this patient is confined to his home and needs intermittent skilled nursing care, physical therapy and occupational therapy.

## 2020-03-03 NOTE — PLAN OF CARE
Problem: Occupational Therapy Goal  Goal: Occupational Therapy Goal  Description  Goals to be met by: 3/28/20     Patient will increase functional independence with ADLs by performing:    LE Dressing with [place].  Grooming while standing with Modified [place].  Toileting from toilet with Modified [place] for hygiene and clothing management. --MET 3/3  Supine to sit with Modified [place].  Step transfer with Modified [place]  Toilet transfer to toilet with Modified [place].  Increased functional strength to WFL for self care skills and functional mobility.  Upper extremity exercise program x10 reps per handout, with independence.      Outcome: Ongoing, Progressing     Patient completing mobility and ADLs /c Sup-Mod Indep. Patient will benefit from HH OT/PT upon D/C.

## 2020-03-03 NOTE — PHYSICIAN QUERY
PT Name: Jian Arrieta  MR #: [id number]    Physician Query Form - Nutrition Clarification     CDS: Gayathri Trinidad RN, CCDS         Contact information :[phone]  [email or web address]       This form is a permanent document in the medical record.     Query Date: March 3, 2020    By submitting this query, we are merely seeking further clarification of documentation.. Please utilize your independent clinical judgment when addressing the question(s) below.    The Medical record contains the following:   Indicators  Supporting Clinical Findings Location in Medical Record   x % of Estimated Energy Intake over a time frame from p.o., TF, or TPN % Intake of Estimated Energy Needs: 75 - 100 %  % Meal Intake: 75 - 100 % RD consult 2/28/20    Weight Status over a time frame      Subcutaneous Fat and/or Muscle Loss      Fluid Accumulation or Edema      Reduced  Strength     x Wt / BMI / Usual Body Weight BMI (Calculated): 39.9 RD consult 2/28/20    Delayed Wound Healing / Failure to Thrive     x Acute or Chronic Illness Sepsis  H/O morbid obesity  Chronic pancreatitis  ELIEZER (acute kidney injury)  Chronic kidney disease, stage 3  Active problem list:  Malnutrition of moderate degree   [facility] Hypoalbuminemia [facility] med PN 2/26/20            Nephrology consult 2/28/20    Medication     x Treatment Addition of Optisource ONS-any flavor BID to supplement pt protein needs.   add supp given his low albumin   RD Plan of care Note 2/28/20        Hospital medicine PN 3/2/20   x Other Pt diagnosed with sepsis, and risk for pressure injury        RD consult 2/28/20     AND / [facility] Clinical Characteristics (October 2011)  A minimum of two characteristics is recommended for diagnosing either moderate or severe malnutrition   Mild Malnutrition Moderate Malnutrition Severe Malnutrition   Energy Intake from p.o., TF or TPN. < 75% intake of estimated energy needs for less than 7 days < 75% intake of estimated energy needs for  greater than 7 days < 50% intake of estimated energy needs for > 5 days   Weight Loss 1-2% in 1 month  5% in 3 months  7.5% in 6 months  10% in 1 year 1-2 % in 1 week  5% in 1 month  7.5% in 3 months  10% in 6 months  20% in 1 year > 2% in 1 week  > 5% in 1 month  > 7.5% in 3 months  > 10% in 6 months  > 20% in 1 year   Physical Findings     None *Mild subcutaneous fat and/or muscle loss  *Mild fluid accumulation  *Stage II decubitus  *Surgical wound or non-healing wound *Mod/severe subcutaneous fat and/or muscle loss  *Mod/severe fluid accumulation  *Stage III or IV decubitus  *Non-healing surgical wound     Provider, please specify diagnosis or diagnoses associated with above clinical findings.    [   ] Mild Protein-Calorie Malnutrition   [   ] Moderate Protein-Calorie Malnutrition   [ X  ] Hypoalbuminemia   [   ] Other Nutritional Diagnosis (please specify):    [   ] Other:    [  ] Clinically Undetermined       Please document in your progress notes daily for the duration of treatment until resolved and include in your discharge summary.

## 2020-03-03 NOTE — PROGRESS NOTES
[facility] Hospital Medicine  Progress Note    Patient Name: Jian Arrieta  MRN: [id number]  Patient Class: IP- Inpatient   Admission Date: 2/23/2020  Length of Stay: 5 days  Attending Physician: Tien Arboleda DO  Primary Care Provider: Leon Barajas DO        Subjective:     Principal Problem:Sepsis        HPI:  Jian Arrieta is a 65 y/o M with h/o CAD, chronic pancreatitis, HTN, chronic Lymphedema, former smoker, morbid obesity, DM II, CABG in x3 years ago (2017), last stents were placed x2 years ago (2018). present with 2 days history of chest heaviness and pain located in the epigastric area, pain is about 2-3 radiating to the back. Pain is exacerbated with exertion like riding lawnmower. There is an associated right flank pain. He denies chills, fever, nausea, or vomiting.     Overview/Hospital Course:  2/27 pt seen, GI had recommended an MRCP, but the pt already had a Cholecystostomy tube placement this mormning, will hold off on the MRCP for now. Ok to provide 1/2 NS  2/28 pt seen, feeling better today,  at site of drain. sug adjusted his abx. Will place on midodrine low dose to prevent the swings and needs for pressors  2/29  Pt is feeling better, more interacting, better urine output, not complaining of pain, can be transferred to tele floor today  3/1 pt seen, feeling much better than prior days. Will continue current abx, and LR and add supp given his low albumin  3/2 pt seen, improving, PT/OT recommended HH, renal is following, till cr elevated, will continue current management    Interval History: continue PT/OT, continue monitoring cr, level at 3.4  Review of Systems   Constitutional: Negative for activity change, chills and fever.   Respiratory: Negative for shortness of breath.    Cardiovascular: Negative for chest pain.   Gastrointestinal: Negative for nausea and vomiting. Abdominal pain: at the drain site.     Objective:     Vital Signs (Most Recent):  Temp:  97.2 °F (36.2 °C) (03/02/20 1735)  Pulse: 77 (03/02/20 1735)  Resp: 17 (03/02/20 1735)  BP: 138/73 (03/02/20 1735)  SpO2: 99 % (03/02/20 1735) Vital Signs (24h Range):  Temp:  [96.8 °F (36 °C)-98.9 °F (37.2 °C)] 97.2 °F (36.2 °C)  Pulse:  [67-80] 77  Resp:  [17-18] 17  SpO2:  [96 %-99 %] 99 %  BP: (120-138)/(56-73) 138/73     Weight: 115.5 kg (254 lb 10.1 oz)  Body mass index is 39.88 kg/m².    Intake/Output Summary (Last 24 hours) at 3/2/2020 1834  Last data filed at 3/2/2020 1800  Gross per 24 hour   Intake 2265 ml   Output 3100 ml   Net -835 ml      Physical Exam   Constitutional: He is oriented to person, place, and time. He appears well-developed and well-nourished.   HENT:   Head: Normocephalic and atraumatic.   Neck: Normal range of motion. Neck supple.   Cardiovascular: Normal rate, regular rhythm and normal heart sounds. Exam reveals no gallop and no friction rub.   No murmur heard.  Pulmonary/Chest: Effort normal and breath sounds normal. No respiratory distress.   Abdominal: Soft. Bowel sounds are normal. There is no tenderness.   Musculoskeletal: Normal range of motion. He exhibits no edema or tenderness.   Neurological: He is alert and oriented to person, place, and time.   Skin: Skin is warm.   Psychiatric: He has a normal mood and affect. His speech is normal and behavior is normal. Thought content normal. His mood appears not anxious. Cognition and memory are normal. He does not exhibit a depressed mood.       Significant Labs:   Bilirubin:   Recent Labs   Lab 02/27/20  0335 02/28/20  0427 02/29/20  0443 03/01/20  0456 03/02/20  0601   BILITOT 0.6 0.4 0.4 0.3 0.3     No results for input(s): LACTATE in the last 48 hours.  Lipase:   No results for input(s): LIPASE in the last 48 hours.  Magnesium:   No results for input(s): MG in the last 48 hours.  Recent Labs   Lab 02/27/20  0059 02/27/20  0335 02/28/20  0427   WBC 22.90* 23.44* 12.54   HGB 12.6* 12.6* 11.1*   HCT 37.4* 37.4* 32.9*    157  125*     Recent Labs   Lab 02/25/20  0618  02/28/20  0427 02/28/20  1210 02/29/20  0443 03/01/20  0456 03/02/20  0601   *   < > 134*  134* 133* 136  136 136 135*   K 4.7   < > 3.8  3.8 3.9 3.8  3.8 3.7 3.9      < > 105  105 104 107  107 106 107   CO2 19*   < > 20*  20* 20* 23  23 23 20*   BUN 23   < > 55*  55* 55* 58*  58* 51* 47*   CREATININE 1.4   < > 3.4*  3.4* 3.4* 3.5*  3.5* 3.5* 3.4*   *   < > 216*  216* 295* 128*  128* 137* 286*   CALCIUM 7.8*   < > 7.4*  7.4* 7.8* 7.9*  7.9* 7.8* 7.8*   MG 1.7   < > 2.1 2.1 2.3  --   --    PHOS 2.3*   < > 3.9  --  4.1 4.3 3.9   LIPASE 4  --   --   --   --   --   --     < > = values in this interval not displayed.     Recent Labs   Lab 02/25/20  1826  02/29/20  0443 03/01/20  0456 03/02/20  0601   ALKPHOS  --    < > 183* 202* 260*   ALT  --    < > 15 13 23   AST  --    < > 11 13 40   ALBUMIN  --    < > 2.0* 1.9* 1.9*   PROT  --    < > 5.1* 4.8* 4.9*   BILITOT  --    < > 0.4 0.3 0.3   INR 1.0  --   --   --   --     < > = values in this interval not displayed.      No results for input(s): CPK, CPKMB, MB, TROPONINI in the last 72 hours.  Recent Labs   Lab 03/01/20  1317 03/01/20  1721 03/01/20  1932 03/02/20  0556 03/02/20  1136 03/02/20  1730   POCTGLUCOSE 254* 298* 359* 280* 405* 344*     Hemoglobin A1C   Date Value Ref Range Status   02/23/2020 12.4 (H) 4.0 - 5.6 % Final     Comment:     ADA Screening Guidelines:  5.7-6.4%  Consistent with prediabetes  >or=6.5%  Consistent with diabetes  High levels of fetal hemoglobin interfere with the HbA1C  assay. Heterozygous hemoglobin variants (HbS, HgC, etc)do  not significantly interfere with this assay.   However, presence of multiple variants may affect accuracy.     02/21/2020 12.5 (H) 4.0 - 5.6 % Final     Comment:     ADA Screening Guidelines:  5.7-6.4%  Consistent with prediabetes  >or=6.5%  Consistent with diabetes  High levels of fetal hemoglobin interfere with the HbA1C  assay.  Heterozygous hemoglobin variants (HbS, HgC, etc)do  not significantly interfere with this assay.   However, presence of multiple variants may affect accuracy.     11/15/2019 9.5 (H) 4.0 - 5.6 % Final     Comment:     ADA Screening Guidelines:  5.7-6.4%  Consistent with prediabetes  >or=6.5%  Consistent with diabetes  High levels of fetal hemoglobin interfere with the HbA1C  assay. Heterozygous hemoglobin variants (HbS, HgC, etc)do  not significantly interfere with this assay.   However, presence of multiple variants may affect accuracy.       Scheduled Meds:   apixaban  2.5 mg Oral BID    ceFEPime (MAXIPIME) IVPB  2 g Intravenous Q24H    insulin detemir U-100  15 Units Subcutaneous Daily    lipase-protease-amylase  1 capsule Oral TID    metroNIDAZOLE  500 mg Oral Q8H     Continuous Infusions:    As Needed: acetaminophen, calcium carbonate, Dextrose 10% Bolus, Dextrose 10% Bolus, Dextrose 10% Bolus, Dextrose 10% Bolus, diphenhydrAMINE, glucagon (human recombinant), glucose, glucose, HYDROcodone-acetaminophen, ibuprofen, insulin aspart U-100, melatonin, midodrine, ondansetron, ondansetron, pneumoc 13-sugey conj-dip cr(PF), sodium chloride 0.9%       Significant Imaging:  No new imaging        Assessment/Plan:      * Sepsis  Bacteremia  Blood cx with GNR, on Vanc and Zosyn- de-escalate to Zosyn  Repeat blood cx on 2/27 2/28  Routine Gram stain awilda bottle: Gram negative rods P   Blood Culture, Routine Results called to and read back by: Kylah Bragg RN  02/26/2020   P   Blood Culture, Routine 06:34 P   Blood Culture, Routine Gram stain aer bottle: Gram negative rods P   Blood Culture, Routine Positive results previously called 02/26/2020  23:54 P   Blood Culture, Routine ESCHERICHIA COLIAbnormal  P   Resulting Agency [facility]   Susceptibility      Escherichia coli     CULTURE, BLOOD (Preliminary)     Amikacin <=8 mcg/mL Sensitive     Amox/K Clav'ate <=8/4 mcg/mL Sensitive     Amp/Sulbactam 16/8 mcg/mL Intermediate      Ampicillin >16 mcg/mL Resistant     Cefazolin 4 mcg/mL Sensitive     Ceftriaxone <=1 mcg/mL Sensitive     Ciprofloxacin >2 mcg/mL Resistant     Ertapenem <=0.5 mcg/mL Sensitive     Gentamicin >8 mcg/mL Resistant     Levofloxacin >4 mcg/mL Resistant     Meropenem <=1 mcg/mL Sensitive     Piperacillin/Tazo <=8 mcg/mL Sensitive     Tetracycline <=2 mcg/mL Sensitive     Tobramycin >8 mcg/mL Resistant     Trimeth/Sulfa <=0.5/9.5 m... Sensitive                    Cholecystitis  2/27  GI had recommended an MRCP, but the pt already had a Cholecystostomy tube placement this mormning,   Continue symptomatic management  Off pressors  2/28 add midodrine for his low BP.  Routine Gram stain awilda bottle: Gram negative rods P   Blood Culture, Routine Results called to and read back by: Kylah Bragg RN  02/26/2020   P   Blood Culture, Routine 06:34 P   Blood Culture, Routine Gram stain aer bottle: Gram negative rods P   Blood Culture, Routine Positive results previously called 02/26/2020  23:54 P   Blood Culture, Routine ESCHERICHIA COLIAbnormal  P   Resulting Agency [facility]   Susceptibility      Escherichia coli     CULTURE, BLOOD (Preliminary)     Amikacin <=8 mcg/mL Sensitive     Amox/K Clav'ate <=8/4 mcg/mL Sensitive     Amp/Sulbactam 16/8 mcg/mL Intermediate     Ampicillin >16 mcg/mL Resistant     Cefazolin 4 mcg/mL Sensitive     Ceftriaxone <=1 mcg/mL Sensitive     Ciprofloxacin >2 mcg/mL Resistant     Ertapenem <=0.5 mcg/mL Sensitive     Gentamicin >8 mcg/mL Resistant     Levofloxacin >4 mcg/mL Resistant     Meropenem <=1 mcg/mL Sensitive     Piperacillin/Tazo <=8 mcg/mL Sensitive     Tetracycline <=2 mcg/mL Sensitive     Tobramycin >8 mcg/mL Resistant     Trimeth/Sulfa <=0.5/9.5 m... Sensitive                2/29 pt is being treated for ecoli and prvencia.  Ok to transfer to [facility], off pressors  3/1 comfortable, continue current POC  3/2 discuss with gen surg about dc planning with f/u in clinic for dtainainge  removal.      Bacteremia    Continue zosyn iv  Routine Gram stain awilda bottle: Gram negative rods P   Blood Culture, Routine Results called to and read back by: Kylha Bragg RN  02/26/2020   P   Blood Culture, Routine 06:34 P   Blood Culture, Routine Gram stain aer bottle: Gram negative rods P   Blood Culture, Routine Positive results previously called 02/26/2020  23:54 P   Blood Culture, Routine ESCHERICHIA COLIAbnormal  P   Resulting Agency [facility]   Susceptibility      Escherichia coli     CULTURE, BLOOD (Preliminary)     Amikacin <=8 mcg/mL Sensitive     Amox/K Clav'ate <=8/4 mcg/mL Sensitive     Amp/Sulbactam 16/8 mcg/mL Intermediate     Ampicillin >16 mcg/mL Resistant     Cefazolin 4 mcg/mL Sensitive     Ceftriaxone <=1 mcg/mL Sensitive     Ciprofloxacin >2 mcg/mL Resistant     Ertapenem <=0.5 mcg/mL Sensitive     Gentamicin >8 mcg/mL Resistant     Levofloxacin >4 mcg/mL Resistant     Meropenem <=1 mcg/mL Sensitive     Piperacillin/Tazo <=8 mcg/mL Sensitive     Tetracycline <=2 mcg/mL Sensitive     Tobramycin >8 mcg/mL Resistant     Trimeth/Sulfa <=0.5/9.5 m... Sensitive                2/29 continue current double abx  afebrile    Chronic kidney disease, stage 3  3/1 add nepro for alb of 1.9      Chronic acquired lymphedema  chronic      ELIEZER (acute kidney injury)  C2/27 hronic kidney disease, stage 3  Renally dose medication  Continue IVF   Cr up to 3 from 2 and 2.5, gentle hydration  2/28 cr up yo 3.4 appreciate nephro consult  2/29 appreciate renal inpput, treating and monitoring ATN response  Continue LR at 50 cc /hr  3/1 cr stable x 3 days at 3.5  3/2 cr still elevated    Type 2 diabetes mellitus with diabetic neuropathy, with long-term current use of insulin  On   levemir  Low dose SSI  accucheck  Diabetic diet  3/2     Recent Labs   Lab 03/01/20  1317 03/01/20  1721 03/01/20  1932 03/02/20  0556 03/02/20  1136 03/02/20  1730   POCTGLUCOSE 254* 298* 359* 280* 405* 344*   Elevated, pt is eating better, will  need to adjust insulin     Chronic diastolic heart failure  2/28 midodrine to prevent use of fluid in chf pt  2/29 monitor while pt is on IVF for renal ATN  3/1 continue gentle hydration    Coronary artery disease due to calcified coronary lesion  2/28 restart Eliquis        VTE Risk Mitigation (From admission, onward)         Ordered     apixaban tablet 2.5 mg  2 times daily      02/28/20 1820     Place sequential compression device  Until discontinued      02/23/20 1704                      Tien Arboleda DO  Department of Hospital Medicine   [facility]

## 2020-03-03 NOTE — PLAN OF CARE
Plan of care reviewed with patient. Pt Pleasant and cooperative today. Pt AAOx4 and continues and able to call staff for assistance. Pts mathew removed today with noted clear yellow urine in bag. Continues to have noted green drainage in bag with 350cc of output in his bag. Ambulated with therapy today.Up in the recliner the rest of the afternoon.Verbalizes to staff understanding. Pt denies pain when asked.NSR on monitor with 0 red alarms noted.  No acute distress observed at this time. Side rails x2, bed in low position, call bell within reach. Bed alarm in place for patient safety. Will relay to oncoming nurse to monitor for changes in condition.

## 2020-03-03 NOTE — ASSESSMENT & PLAN NOTE
On   levemir  Low dose SSI  accucheck  Diabetic diet  3/2     Recent Labs   Lab 03/01/20  1317 03/01/20  1721 03/01/20  1932 03/02/20  0556 03/02/20  1136 03/02/20  1730   POCTGLUCOSE 254* 298* 359* 280* 405* 344*   Elevated, pt is eating better, will need to adjust insulin

## 2020-03-03 NOTE — ASSESSMENT & PLAN NOTE
3/1 add nepro for alb of 1.9  3/2 Dc home on meds, no changes during this admission  F/u with nephrology as outpatient

## 2020-03-03 NOTE — PLAN OF CARE
Per primary team pt will not require home IV antibiotics.  Will plan for d/c home today with home health for PT/OT/nursing for care of biliary drain.  Pt is aware, verbalized understanding.  Allowed time for questions with both MD and CM in room.  CM also discussed plan with wife Geri via telephone.  Wife will pick pt up later today at d/c.      Nursing has reviewed care of drain with pt.  Pt will have supplemental teaching with home health agency.  Pt has no DME needs at this time.    Future Appointments   Date Time Provider Department Center   3/4/2020  8:00 AM CG[facility], ENDOCRINE [facility] ENDOPRATIMA [place] [facility]   3/6/2020  2:30 PM Huan Carney MD [facility] ENDODIA [facility]   3/9/2020 10:00 AM Chanda Hanley MD [facility] IMPRI [facility]   3/13/2020 11:00 AM SON Rowe MD [facility] TUMOR [facility]       Kelin Franz, RN    [phone]

## 2020-03-03 NOTE — ASSESSMENT & PLAN NOTE
2/27  GI had recommended an MRCP, but the pt already had a Cholecystostomy tube placement this mormning,   Continue symptomatic management  Off pressors  2/28 add midodrine for his low BP.  Routine Gram stain awilda bottle: Gram negative rods P   Blood Culture, Routine Results called to and read back by: Kylah Bragg RN  02/26/2020   P   Blood Culture, Routine 06:34 P   Blood Culture, Routine Gram stain aer bottle: Gram negative rods P   Blood Culture, Routine Positive results previously called 02/26/2020  23:54 P   Blood Culture, Routine ESCHERICHIA COLIAbnormal  P   Resulting Agency [facility]   Susceptibility      Escherichia coli     CULTURE, BLOOD (Preliminary)     Amikacin <=8 mcg/mL Sensitive     Amox/K Clav'ate <=8/4 mcg/mL Sensitive     Amp/Sulbactam 16/8 mcg/mL Intermediate     Ampicillin >16 mcg/mL Resistant     Cefazolin 4 mcg/mL Sensitive     Ceftriaxone <=1 mcg/mL Sensitive     Ciprofloxacin >2 mcg/mL Resistant     Ertapenem <=0.5 mcg/mL Sensitive     Gentamicin >8 mcg/mL Resistant     Levofloxacin >4 mcg/mL Resistant     Meropenem <=1 mcg/mL Sensitive     Piperacillin/Tazo <=8 mcg/mL Sensitive     Tetracycline <=2 mcg/mL Sensitive     Tobramycin >8 mcg/mL Resistant     Trimeth/Sulfa <=0.5/9.5 m... Sensitive                2/29 pt is being treated for ecoli and prvencia.  Ok to transfer to [facility], off pressors  3/1 comfortable, continue current POC  3/2 discuss with gen surg about dc planning with f/u in clinic for dtainainge removal.

## 2020-03-03 NOTE — PT/OT/SLP PROGRESS
Occupational Therapy   Treatment    Name: Jian Arrieta  MRN: [id number]  Admitting Diagnosis:  Sepsis  6 Days Post-Op    Recommendations:     Discharge Recommendations: home health PT, home health OT  Discharge Equipment Recommendations:  none  Barriers to discharge:  None    Assessment:   Patient completing mobility and ADLs /c Sup-Mod Indep. Patient will benefit from  OT/PT upon D/C.     Jian Arrieta is a 64 y.o. male with a medical diagnosis of Sepsis. Performance deficits affecting function are weakness, impaired endurance, impaired self care skills, impaired functional mobilty, gait instability, impaired balance, decreased lower extremity function, decreased safety awareness, edema, impaired skin.     Rehab Prognosis:  Good; patient would benefit from acute skilled OT services to address these deficits and reach maximum level of function.       Plan:     Patient to be seen 5 x/week to address the above listed problems via self-care/home management, therapeutic activities, therapeutic exercises  · Plan of Care Expires: 03/28/20  · Plan of Care Reviewed with: patient    Subjective     Pain/Comfort:  · Pain Rating 1: 0/10  · Pain Rating Post-Intervention 1: 0/10    Objective:     Communicated with: Iris de la paz prior to session.  Patient found HOB elevated with central line, telemetry upon OT entry to room.    General Precautions: Standard, fall   Orthopedic Precautions:N/A   Braces: N/A     Bed Mobility:    · Patient completed Scooting/Bridging with modified independence  · Patient completed Supine to Sit with supervision     Functional Mobility/Transfers:  · Patient completed Sit <> Stand Transfer with supervision  with  no assistive device   · Patient completed Bed <> Chair Transfer using Step Transfer technique with supervision with no assistive device  · Patient completed Toilet Transfer Step Transfer technique with supervision with  no AD    Activities of Daily Living:  · Grooming: modified independence     · Toileting: modified independence      [facility] 6 Click ADL: 19    Treatment & Education:  Patient /c bed mob as noted above. Ambulated in room and to bathroom /c Sup and no AD. ADLs completed as above. Patient requesting to be set-up for bath in b/s chair.     Patient left up in chair with all lines intact, call button in reach and nsg notifiedEducation:      GOALS:   Multidisciplinary Problems     Occupational Therapy Goals        Problem: Occupational Therapy Goal    Goal Priority Disciplines Outcome Interventions   Occupational Therapy Goal     OT, PT/OT Ongoing, Progressing    Description:  Goals to be met by: 3/28/20     Patient will increase functional independence with ADLs by performing:    LE Dressing with Modified [place].  Grooming while standing with Modified [place].  Toileting from toilet with Modified [place] for hygiene and clothing management. --MET 3/3  Supine to sit with Modified [place].  Step transfer with Modified [place]  Toilet transfer to toilet with Modified [place].  Increased functional strength to WFL for self care skills and functional mobility.  Upper extremity exercise program x10 reps per handout, with independence.                       Time Tracking:     OT Date of Treatment: 03/03/20  OT Start Time: 0836  OT Stop Time: 0914  OT Total Time (min): 38 min    Billable Minutes:Self Care/Home Management 38    AFIA Melendez  3/3/2020

## 2020-03-03 NOTE — PT/OT/SLP PROGRESS
"Physical Therapy Treatment    Patient Name:  Jian Arrieta   MRN:  [id number]    Recommendations:     Discharge Recommendations:  home health PT, home health OT   Discharge Equipment Recommendations: none   Barriers to discharge: None    Assessment:     Jian Arrieta is a 64 y.o. male admitted with a medical diagnosis of Sepsis.  He presents with the following impairments/functional limitations:  weakness, impaired endurance, impaired self care skills, impaired functional mobilty, gait instability, impaired balance, decreased upper extremity function, decreased lower extremity function, decreased coordination, decreased ROM, impaired skin, edema.  Pt would continue to benefit from P.T. To address impairments listed above.      Rehab Prognosis: Good; patient would benefit from acute skilled PT services to address these deficits and reach maximum level of function.    Recent Surgery: Procedure(s) (LRB):  ULTRASOUND, UPPER GI TRACT, ENDOSCOPIC (N/A) 6 Days Post-Op    Plan:     During this hospitalization, patient to be seen 5 x/week to address the identified rehab impairments via gait training, therapeutic activities, therapeutic exercises, neuromuscular re-education and progress toward the following goals:    · Plan of Care Expires:  03/28/20    Subjective     Chief Complaint: "They are never going to let me out of here."  Patient/Family Comments/goals: Pt agreed to tx.  Pain/Comfort:  · Pain Rating 1: 0/10  · Pain Rating Post-Intervention 1: 0/10      Objective:     Communicated with RN (Iris) prior to session.  Patient found up in chair with telemetry(drain) upon PT entry to room.     General Precautions: Standard, fall   Orthopedic Precautions:N/A   Braces:       Functional Mobility:  · Transfers:     · Sit to Stand:  modified independence with no AD  · Bed <> Chair: modified independence and supervision with  no AD  using  Step Transfer  · Gait: 125ft x 2 without A.D. and CGA/SBA.  Pt demonstrated one bout of " LOB in the beginning of gait secondary to R foot rolling out to the lateral side.  Jamee to recover by PTA with pt grabbing chair rail on wall.  PTA tightened pt's slipper and no LOB noted during the remainder of gait training.  slow miracle with increased w/s L > R and WBOS.  · Balance: sitting good, standing good/good-, gait fair/fair-      AM-PAC 6 CLICK MOBILITY  Turning over in bed (including adjusting bedclothes, sheets and blankets)?: 4  Sitting down on and standing up from a chair with arms (e.g., wheelchair, bedside commode, etc.): 3  Moving from lying on back to sitting on the side of the bed?: 4  Moving to and from a bed to a chair (including a wheelchair)?: 3  Need to walk in hospital room?: 3  Climbing 3-5 steps with a railing?: 3  Basic Mobility Total Score: 20       Therapeutic Activities and Exercises:   SeatedBLE therex: AP, LAQ x 15 reps.  Pt refused futher LE therex in sitting or standing.    Patient left up in chair with all lines intact, call button in reach, chair alarm on and RN notified..    GOALS:   Multidisciplinary Problems     Physical Therapy Goals        Problem: Physical Therapy Goal    Goal Priority Disciplines Outcome Goal Variances Interventions   Physical Therapy Goal     PT, PT/OT Ongoing, Progressing     Description:  Goals to be met by: 3/28/2020     Patient will increase functional independence with mobility by performin. Supine to sit with Modified Josephine  2. Sit to stand transfer with Modified Josephine  3. Bed to chair transfer with Modified Josephine using appropriate AD  4. Gait  x 100 feet with Modified Josephine using appropriate AD.   5. Lower extremity exercise program x15 reps per handout, with supervision                      Time Tracking:     PT Received On: 20  PT Start Time: 1503     PT Stop Time: 1527  PT Total Time (min): 24 min     Billable Minutes: Gait Training 16 and Therapeutic Exercise 8    Treatment Type: Treatment  PT/PTA: PTA      PTA Visit Number: 2     Sandra Martinez, RICHY  03/03/2020

## 2020-03-03 NOTE — PLAN OF CARE
Problem: Adult Inpatient Plan of Care  Goal: Plan of Care Review    Patient is awake and orientedx4. Care plan explained to patient; he verbalized understanding. On room air, O2 saturation at 98%. Hooked to heart monitor running normal sinus rhythm at 70-90bpm. No pain/n/v/d during shift. Due medications given. Accuchecks completed, covered per sliding scale. Encouraged to turn every 2 hours as tolerated. RUQ drain in place draining green bile, 360mL overnight. Maintained on fall risk precaution. Bed in lowest position, bed alarm on, call light/personal items within reach and instructed to call for help when needed. Will continue to monitor.    Outcome: Ongoing, Progressing

## 2020-03-03 NOTE — PLAN OF CARE
Problem: Physical Therapy Goal  Goal: Physical Therapy Goal  Description  Goals to be met by: 3/28/2020     Patient will increase functional independence with mobility by performin. Supine to sit with Modified [place]  2. Sit to stand transfer with Modified [place] Met 3/3/20  3. Bed to chair transfer with Modified [place] using appropriate AD  4. Gait  x 100 feet with Modified [place] using appropriate AD.   5. Lower extremity exercise program x15 reps per handout, with supervision     Outcome: Ongoing, Progressing

## 2020-03-04 ENCOUNTER — TELEPHONE (OUTPATIENT)
Dept: NEUROLOGY | Facility: HOSPITAL | Age: 65
End: 2020-03-04
Payer: MEDICARE

## 2020-03-04 ENCOUNTER — TELEPHONE (OUTPATIENT)
Dept: ENDOCRINOLOGY | Facility: CLINIC | Age: 65
End: 2020-03-04

## 2020-03-04 NOTE — NURSING
Wife here to transport pt home. Return demonstration from wife on how to take care of drain done. Discharge paperwork done with wife. Pt transported to car by staff.

## 2020-03-04 NOTE — PLAN OF CARE
Discharge discussed with pt. Pt educated  about drain care. Pt with teds on at this time. Waiting on wife for further instructions to be given for return demonstration on how to take care of drain..Pt acknowledged understanding of discharge medications and follow up appts.  Receivied meds from pharmacy. Pt with  0 distress at this time. IV removed with catheter intact.Denies pain or discomfort upon discharge.

## 2020-03-04 NOTE — PLAN OF CARE
Pt has been approved for home health services by [facility] coral Zhang at [facility].         03/04/20 0959   Final Note   Assessment Type Final Discharge Note   Anticipated Discharge Disposition Home-Health   Hospital Follow Up  Appt(s) scheduled? Yes   Discharge plans and expectations educations in teach back method with documentation complete? Yes       Kelin Franz RN    [phone]

## 2020-03-04 NOTE — TELEPHONE ENCOUNTER
Spoke with patient , would like to reschedule CGM and also speak with Melissa regarding blood sugar levels. Will call back with schedule for CGM  after rescheduling.

## 2020-03-05 ENCOUNTER — PATIENT OUTREACH (OUTPATIENT)
Dept: ADMINISTRATIVE | Facility: OTHER | Age: 65
End: 2020-03-05

## 2020-03-05 PROCEDURE — G0180 PR HOME HEALTH MD CERTIFICATION: ICD-10-PCS | Mod: ,,, | Performed by: INTERNAL MEDICINE

## 2020-03-05 PROCEDURE — G0180 MD CERTIFICATION HHA PATIENT: HCPCS | Mod: ,,, | Performed by: INTERNAL MEDICINE

## 2020-03-05 NOTE — PROGRESS NOTES
"NOLANETS:  [facility]  A collaboration between [facility] and [facility]      PATIENT: Jian Arrieta  MRN: [id number]  DATE: 3/13/2020    Subjective:      Chief Complaint: No chief complaint on file.      Vitals:   Vitals:    03/13/20 1303   BP: 117/67   BP Location: Right arm   Patient Position: Sitting   BP Method: Medium (Automatic)   Pulse: 100   Weight: 112.8 kg (248 lb 9.1 oz)   Height: 5' 7" (1.702 m)        Karnofsky Score:     Diagnosis:   1. Obesity (BMI 30-39.9)    2. Cholecystostomy tube dysfunction, initial encounter    3. Chronic kidney disease, stage 3    4. Chronic combined systolic and diastolic heart failure    5. Hepatic fibrosis         Oncologic History: na    Interval History:  Follow-up from recent discharge after cholecystostomy tube placed for cholecystitis.  Tube appears to be occluded has not been flushed in several days.     63 y.o. male with PMH morbid obesity (BMI 41), stage 1 hepatic fibrosis (biopsy negative for cirrhosis), chronic pancreatitis and extrahepatic portal vein stenosis with  large volume ascites requiring twice weekly paracenteses in addition to pancreatic pseudocyst, non-compliance with low salt diet/fluid restriction, DM insulin dependent (HgA1c 7.1 on 5/23/19), BLE lymphedema, CHF, pAF on Eliquis and Metoprolol, CAD s/p PCI x5 on Plavix with subsequent CABG x3v (~2017 at ; LIMA-LAD [patent], SVG-OM [patent], SVG-RCA[occluded; L->R collaterals from LAD to PDA]), HTN, HLD, BERHANE on CPAP, R knee OA, Charcot foot, normocytic anemia, who presented to [facility] 6/27/19 as a transfer from [facility] with large volume ascites 2/2 portal vein occlusion requiring IR PV dilation.  Patient experienced acute renal failure after dye injection superimposed on diabetic nephropathy.  Renal insufficiency resolved ascites resolved and lower extremity edema improved with fluid " restriction and diuretics.  Patient was discharged to a rehab unit.   Looking for to beginning home physical therapy and being followed in wound care for chronic venous stasis ulcer.   He states his blood sugars have been fine his blood sugar has been good on minimal medications and his ascites has resolved.  He still admits to noncompliance with salt and water and lower extremity edema worsens when he is noncompliant.      Past Medical History:  Past Medical History:   Diagnosis Date    Alcohol abuse     Anasarca 1/28/2019    Anemia     Arthropathy associated with neurological disorder 9/2/2015    Atherosclerosis     Charcot foot due to diabetes mellitus     Chronic combined systolic and diastolic heart failure 01/29/2019 1-28-19 Left VentricleModerate decreased ejection fraction at 30%. Normal left ventricular cavity size. Normal wall thickness observed. Grade I (mild) left ventricular diastolic dysfunction consistent with impaired relaxation. Normal left atrial pressure. Moderate, global hypokinesis(see wall scoring diagram). Right VentricleNormal cavity size, wall thickness and ejection fraction. Wall motion n    Chronic pancreatitis 1/28/2019    CKD (chronic kidney disease) stage 3, GFR 30-59 ml/min     Colon polyps     approx 5 yrs ago    Coronary artery disease due to calcified coronary lesion 05/08/2015    5 stents on ASA      Diabetic polyneuropathy associated with type 2 diabetes mellitus 9/2/2015    Diverticulosis 1/28/2019    DM type 2 with diabetic peripheral neuropathy 2/4/2019    Encounter for blood transfusion     Essential hypertension 1/28/2019    Former smoker 8/26/2015    Healed ulcer of left foot on examination 6/20/2017    Hematuria     Hydrocele     approx 1.5 yrs ago    Hyperphosphatemia     Hypoalbuminemia 2/4/2019    Hypocalcemia     Lymphedema of both lower extremities 1/29/2019    Mixed hyperlipidemia 5/8/2015    Morbid obesity with BMI of 50.0-59.9, adult  5/8/2015    Onychomycosis of multiple toenails with type 2 diabetes mellitus and peripheral neuropathy 6/20/2017    Perianal cyst     approx 2 yrs ago    Proteinuria     Pseudocyst of pancreas 1/28/2019 1-28-19 Liver has a cirrhotic morphology with no focal lesions.  Significant interval increase in ascites when compared to prior exam which may account for patient's abdominal distension.  Hypodense air-fluid collection along the body of the pancreas which is slightly smaller when compared to prior CT.  Findings may relate to pancreatic necrosis with pancreatic pseudocysts with infected pseudocyst    Skin cancer     skin cancer    Sleep apnea 8/26/2015    Status post bariatric surgery 1/11/2016    Type 2 diabetes mellitus, with long-term current use of insulin 5/8/2015       Past Surgical History:  Past Surgical History:   Procedure Laterality Date    ANGIOGRAPHY N/A 6/28/2019    Procedure: ANGIOGRAM-PV STENT;  Surgeon: Ewa Diagnostic Provider;  Location: [facility] OR;  Service: Radiology;  Laterality: N/A;    ANGIOPLASTY      total x5 stents    COLONOSCOPY N/A 10/6/2015    Procedure: COLONOSCOPY;  Surgeon: Shekhar Richards MD;  [facility]  Service: Endoscopy;  Laterality: N/A;  BMI over 55/2nd floor case    5 day hold Plavix, Dr Kwadwo Arroyo    CORONARY ANGIOGRAPHY Right 3/20/2019    Procedure: ANGIOGRAM, CORONARY ARTERY;  Surgeon: Bob Duque MD;  Location: [facility] CATH LAB;  Service: Cardiology;  Laterality: Right;    CORONARY ARTERY BYPASS GRAFT  2017    x3    CORONARY BYPASS GRAFT ANGIOGRAPHY  3/20/2019    Procedure: Bypass graft study;  Surgeon: Bob Duque MD;  Location: [facility] CATH LAB;  Service: Cardiology;;    CYST REMOVAL      ENDOSCOPIC ULTRASOUND OF UPPER GASTROINTESTINAL TRACT N/A 2/26/2020    Procedure: ULTRASOUND, UPPER GI TRACT, ENDOSCOPIC;  Surgeon: Robbie Yang MD;  Location: [facility] ENDO;  Service: Endoscopy;  Laterality: N/A;     ESOPHAGOGASTRODUODENOSCOPY N/A 7/8/2019    Procedure: ESOPHAGOGASTRODUODENOSCOPY (EGD);  Surgeon: Huan Brumfield MD;  Location: [facility];  Service: Endoscopy;  Laterality: N/A;    GASTRECTOMY      INSERTION OF DIALYSIS CATHETER N/A 5/17/2019    Procedure: pleurx;  Surgeon: Ewa Diagnostic Provider;  Location: [facility] OR [address] [place] [address];  Service: General;  Laterality: N/A;  Room 188/[place]    KNEE ARTHROSCOPY      perianal surgery      perianal cyst removed       Family History:  Family History   Problem Relation Age of Onset    Cancer Mother     Cancer Father     Heart disease Father     Obesity Sister     Parkinsonism Brother     No Known Problems Paternal Grandmother     Cancer Paternal Grandfather     Cancer Brother     Diabetes Maternal Grandmother     Stroke Maternal Grandfather     Cirrhosis Neg Hx        Allergies:  Review of patient's allergies indicates:  No Known Allergies    Medications:  Current Outpatient Medications   Medication Sig Dispense Refill    apixaban (ELIQUIS) 5 mg Tab Take 1 tablet (5 mg total) by mouth 2 (two) times daily. 60 tablet 11    blood sugar diagnostic Strp Test strips to use with meter covered by insurance to check blood sugars twice daily. Insulin dependent diabetic. 200 strip 11    blood-glucose meter Misc Glucometer covered by insurance to check blood sugars at home.  Insulin dependent diabetic. 1 each 0    cyanocobalamin, vitamin B-12, 500 mcg Subl Place 1 tablet under the tongue every Monday.       ferrous sulfate (FEOSOL ORAL) Take 325 mg by mouth once daily.       furosemide (LASIX) 20 MG tablet Take 1 tablet (20 mg total) by mouth 2 (two) times daily. 60 tablet 11    glucagon, human recombinant, (GLUCAGON EMERGENCY KIT, HUMAN,) 1 mg SolR Inject 1 mg into the muscle as needed (For severely low sugar). 1 each 2    insulin aspart U-100 (NOVOLOG) 100 unit/mL injection Inject 8 Units into the skin 3 (three) times daily before meals.       insulin detemir U-100  "(LEVEMIR FLEXTOUCH) 100 unit/mL (3 mL) SubQ InPn pen Inject 20 Units into the skin every evening. 3 mL 12    lancets Misc Lancets to use with meter covered by insurance to check blood sugars twice daily.  Insulin dependent diabetic. 200 each 11    metoprolol succinate (TOPROL-XL) 25 MG 24 hr tablet Take 1 tablet (25 mg total) by mouth once daily. 30 tablet 11    metroNIDAZOLE (FLAGYL) 500 MG tablet Take 500 mg by mouth 3 (three) times daily.      pen needle, diabetic (INSUPEN) 32 gauge x 1/4" Ndle 1 each by Misc.(Non-Drug; Combo Route) route 4 (four) times daily. 360 each 3     No current facility-administered medications for this visit.        Review of Systems   Constitutional: Positive for activity change, appetite change and unexpected weight change. Negative for fatigue and fever.   HENT: Negative.    Eyes:        Wears glasses   Respiratory: Negative for cough, shortness of breath and wheezing.    Cardiovascular: Positive for palpitations and leg swelling. Negative for chest pain.   Gastrointestinal: Negative for abdominal distention, abdominal pain, constipation and diarrhea.   Genitourinary: Negative for difficulty urinating.   Musculoskeletal: Positive for arthralgias, back pain and gait problem.   Skin: Negative.    Allergic/Immunologic: Negative for immunocompromised state.   Neurological: Positive for syncope.   Hematological: Bruises/bleeds easily.   Psychiatric/Behavioral: Negative for dysphoric mood. The patient is not nervous/anxious.       Objective:      Physical Exam   Constitutional: He is oriented to person, place, and time. He appears well-developed and well-nourished.   Eyes: Pupils are equal, round, and reactive to light. No scleral icterus.   Neck: No JVD present. No tracheal deviation present.   Cardiovascular: Normal rate and regular rhythm.   Pulmonary/Chest: Effort normal and breath sounds normal.   Abdominal: Soft. Bowel sounds are normal. He exhibits no distension. There is no " tenderness. There is no guarding.   Cholecystostomy tube in right upper quadrant     Musculoskeletal: Normal range of motion. He exhibits edema.   Neurological: He is alert and oriented to person, place, and time.   Skin: Skin is warm and dry.   Psychiatric: He has a normal mood and affect. His behavior is normal. Judgment and thought content normal.   Nursing note and vitals reviewed.     Assessment:       1. Obesity (BMI 30-39.9)    2. Cholecystostomy tube dysfunction, initial encounter    3. Chronic kidney disease, stage 3    4. Chronic combined systolic and diastolic heart failure    5. Hepatic fibrosis        Laboratory Data:    Neuroendocrine Labs Latest Ref Rng & Units 3/13/2020 3/10/2020 3/10/2020 3/6/2020 3/3/2020 3/3/2020 3/2/2020   FOLATE 4.0 - 24.0 ng/mL - - - - - - -   VITAMIN B12 210 - 950 pg/mL - - - - - - -   TSH 0.400 - 4.000 uIU/mL - - - - - - -   WBC 3.90 - 12.70 K/uL - 6.65 - - 11.24 - -   HGB 14.0 - 18.0 g/dL - 10.4(L) - - 12.1(L) - -   HCT 40.0 - 54.0 % - 33.5(L) - - 35.6(L) - -   PLATLETS 150 - 350 K/uL - 278 - - 197 - -   PT 9.0 - 12.5 sec - - - - - - -   PTT 21.0 - 32.0 sec - - - - - - -   INR 0.8 - 1.2 - - - - - - -   GLUCOSE 70 - 110 mg/dL - 490(HH) - - 295(H) - 286(H)   BUN 8 - 23 mg/dL - 22 - - 40(H) - 47(H)   CREATININE 0.5 - 1.4 mg/dL - 2.2(H) - - 2.7(H) - 3.4(H)    - 145 mmol/L - 139 - - 133(L) - 135(L)   K 3.5 - 5.1 mmol/L - 4.6 - - 4.2 - 3.9   CHLORIDE 95 - 110 mmol/L - 108 - - 105 - 107   CO2 23 - 29 mmol/L - 23 - - 20(L) - 20(L)   CALCIUM 8.7 - 10.5 mg/dL - 8.1(L) - - 7.8(L) - 7.8(L)   PROTEIN, TOTAL 6.0 - 8.4 g/dL - 6.0 - - - - 4.9(L)   PHOSPHORUS 2.7 - 4.5 mg/dL - - - - - - 3.9   ALBUMIN 3.5 - 5.2 g/dL - 2.7(L) - - - - 1.9(L)   TOTAL BILIRUBIN 0.1 - 1.0 mg/dL - 0.3 - - - - 0.3   DIRECT BILIRUBIN 0.1 - 0.3 mg/dL - - - - - - -   ALK PHOSPHATASE 55 - 135 U/L - 218(H) - - - - 260(H)   GGT 8 - 55 U/L - - - - - - -   SGOT (AST) 10 - 40 U/L - 61(H) - - - - 40   SGPT (ALT) 10 - 44  U/L - 48(H) - - - - 23   SERUM AMYLASE 20 - 110 U/L - - - - - - -    - 260 U/L - - - - - - -   TRIGLYCERIDES 30 - 150 mg/dL - - - - - - -   CHOLERSTEROL 120 - 199 mg/dL - - - - - - -   HDL 40 - 75 mg/dL - - - - - - -   LDL 63.0 - 159.0 mg/dL - - - - - - -   MG 1.6 - 2.6 mg/dL - - - - - - -   URINE AMYLASE U/L Not established U/L - - - - - - -   24 HR URINE PROTEIN 0 - 15 mg/dL - - - - - - -   24 HR CREATININE CLEARANCE 23.0 - 375.0 mg/dL - - - - - - -   HEMOGLOBIN A1C 4.0 - 5.6 % - - - - - - -   Weight - 248 lbs 9 oz - 250 lbs 4 oz 254 lbs 3 oz - 290 lbs 9 oz -   Sedimentation Rate, Manual 0 - 10 mm/Hr - - - - - - -     Cholecystostomy tube flushed and aspirated several times with sterile saline good results.    Impression:  Status post cholecystostomy with occluded tube    Extrahepatic portal hypertension and ascites resolved.  Chronic pancreatitis quiescent.   Lower extremity edema persists, mild hepatic fibrosis.  Patient needs to adhere to sodium and water restricted diet    Plan:       Follow-up with Dr. Marino in interventional Radiology in 3 weeks for tube cholangiogram and tube removal if satisfactory  Follow-up with PCP  Continue with wound care, physical therapy, and rehab.  2 g sodium 1500 cc a day fluid restriction  RTC 6 -8 weeks              SON Rowe MD, FACS  Professor of Surgery, [facility]  Neuroendocrine Surgery, Hepatic/Pancreatic & General Surgery  [address], [address]  [place]  [address]  ph. [phone]; [phone]  fax. [phone]

## 2020-03-05 NOTE — PHYSICIAN QUERY
PT Name: Jian Arrieta  MR #: [id number]  Physician Query Form - CKD Clarification     CDS: Gayathri Trinidad RN, CCDS         Contact information :[phone]  [email or web address]     This form is a permanent document in the medical record.     Query Date: March 5, 2020    By submitting this query, we are merely seeking further clarification of documentation. Please utilize your independent clinical judgment when addressing the question(s) below.    The Medical record contains the following:     Indicators   Supporting Clinical Findings   Location in Medical Record   x CKD or Chronic Kidney (Renal) Failure / Disease [facility]IE[facility]ER/CKD 4  CKD 3 Nephrology consult 2/26/20  H&P 2/23/20   x BUN/Creatinine                          GFR BUN 18, creatinine 1.7, GFR 41  BUN 40, creatinine 2.7, GFR 24 Lab 2/23/20  Lab 3/3/20    Dehydration      Nausea / Vomiting      Dialysis / CRRT      Medication     x Treatment Avoid nephrotoxic agents  Renally dose medication  LR 1 liter - 1 0 hours  Creatinine seems to  plateu @ ~ 3.5. Urine output ? 4.1 L after stopping IVF. ?? Polyuric phase of ATN. Closely monitor, might need to restart IVF to match 2/3 of output  Electrolytes and volume are acceptable, no indication for emergent dialysis H&P 2/23/20    Nephrology consult 2/26/20  Nephrology PN 3/2/20    Other Chronic Conditions     x Other F/u with nephrology as outpatient   Discharge summary 3/3/20     Provider, please further specify the stage of CKD.  Please clarify conflicting documentation of stage of CKD     Chronic Kidney Disease (CKD) (please specify stage* below)      [employer] Definitions     Stage Description eGFR (mL/min)   [   ]    III Moderately reduced kidney function 30-59   [ X  ]    IV Severely reduced kidney function 15-29   [   ] Other (please specify): ____________    [   ]  Clinically Undetermined          Please document in your progress notes daily for the duration of treatment until resolved and  include in your discharge summary.

## 2020-03-06 ENCOUNTER — OFFICE VISIT (OUTPATIENT)
Dept: ENDOCRINOLOGY | Facility: CLINIC | Age: 65
End: 2020-03-06
Payer: MEDICARE

## 2020-03-06 VITALS
RESPIRATION RATE: 18 BRPM | WEIGHT: 254.19 LBS | SYSTOLIC BLOOD PRESSURE: 130 MMHG | BODY MASS INDEX: 39.9 KG/M2 | HEART RATE: 74 BPM | DIASTOLIC BLOOD PRESSURE: 72 MMHG | HEIGHT: 67 IN

## 2020-03-06 DIAGNOSIS — E11.22 TYPE 2 DIABETES MELLITUS WITH STAGE 3 CHRONIC KIDNEY DISEASE, WITHOUT LONG-TERM CURRENT USE OF INSULIN: Primary | Chronic | ICD-10-CM

## 2020-03-06 DIAGNOSIS — Z91.199 NON COMPLIANCE WITH MEDICAL TREATMENT: ICD-10-CM

## 2020-03-06 DIAGNOSIS — N17.8 ACUTE RENAL FAILURE WITH SPECIFIED LESION: ICD-10-CM

## 2020-03-06 DIAGNOSIS — N18.30 TYPE 2 DIABETES MELLITUS WITH STAGE 3 CHRONIC KIDNEY DISEASE, WITHOUT LONG-TERM CURRENT USE OF INSULIN: Primary | Chronic | ICD-10-CM

## 2020-03-06 PROCEDURE — 3078F DIAST BP <80 MM HG: CPT | Mod: CPTII,S$GLB,, | Performed by: INTERNAL MEDICINE

## 2020-03-06 PROCEDURE — 1101F PR PT FALLS ASSESS DOC 0-1 FALLS W/OUT INJ PAST YR: ICD-10-PCS | Mod: CPTII,S$GLB,, | Performed by: INTERNAL MEDICINE

## 2020-03-06 PROCEDURE — 99214 OFFICE O/P EST MOD 30 MIN: CPT | Mod: S$GLB,,, | Performed by: INTERNAL MEDICINE

## 2020-03-06 PROCEDURE — 3008F PR BODY MASS INDEX (BMI) DOCUMENTED: ICD-10-PCS | Mod: CPTII,S$GLB,, | Performed by: INTERNAL MEDICINE

## 2020-03-06 PROCEDURE — 99999 PR PBB SHADOW E&M-EST. PATIENT-LVL IV: CPT | Mod: PBBFAC,,, | Performed by: INTERNAL MEDICINE

## 2020-03-06 PROCEDURE — 99499 UNLISTED E&M SERVICE: CPT | Mod: S$GLB,,, | Performed by: INTERNAL MEDICINE

## 2020-03-06 PROCEDURE — 3075F SYST BP GE 130 - 139MM HG: CPT | Mod: CPTII,S$GLB,, | Performed by: INTERNAL MEDICINE

## 2020-03-06 PROCEDURE — 3008F BODY MASS INDEX DOCD: CPT | Mod: CPTII,S$GLB,, | Performed by: INTERNAL MEDICINE

## 2020-03-06 PROCEDURE — 3046F PR MOST RECENT HEMOGLOBIN A1C LEVEL > 9.0%: ICD-10-PCS | Mod: CPTII,S$GLB,, | Performed by: INTERNAL MEDICINE

## 2020-03-06 PROCEDURE — 99499 RISK ADDL DX/OHS AUDIT: ICD-10-PCS | Mod: S$GLB,,, | Performed by: INTERNAL MEDICINE

## 2020-03-06 PROCEDURE — 99214 PR OFFICE/OUTPT VISIT, EST, LEVL IV, 30-39 MIN: ICD-10-PCS | Mod: S$GLB,,, | Performed by: INTERNAL MEDICINE

## 2020-03-06 PROCEDURE — 3078F PR MOST RECENT DIASTOLIC BLOOD PRESSURE < 80 MM HG: ICD-10-PCS | Mod: CPTII,S$GLB,, | Performed by: INTERNAL MEDICINE

## 2020-03-06 PROCEDURE — 99999 PR PBB SHADOW E&M-EST. PATIENT-LVL IV: ICD-10-PCS | Mod: PBBFAC,,, | Performed by: INTERNAL MEDICINE

## 2020-03-06 PROCEDURE — 1101F PT FALLS ASSESS-DOCD LE1/YR: CPT | Mod: CPTII,S$GLB,, | Performed by: INTERNAL MEDICINE

## 2020-03-06 PROCEDURE — 3075F PR MOST RECENT SYSTOLIC BLOOD PRESS GE 130-139MM HG: ICD-10-PCS | Mod: CPTII,S$GLB,, | Performed by: INTERNAL MEDICINE

## 2020-03-06 PROCEDURE — 3046F HEMOGLOBIN A1C LEVEL >9.0%: CPT | Mod: CPTII,S$GLB,, | Performed by: INTERNAL MEDICINE

## 2020-03-06 NOTE — PROGRESS NOTES
Subjective:      Chief Complaint: Consult for uncontrolled type 2 diabetes    HPI: Jian Arrieta is a 64 y.o. male who is here for follow-up evaluation for type 2 diabetes mellitus    Last seen by me 12/06/2019.    He has an extensive past medical history:  Past Medical History:   Diagnosis Date    Alcohol abuse     Anasarca 1/28/2019    Arthropathy associated with neurological disorder 9/2/2015    Atherosclerosis     Charcot foot due to diabetes mellitus     Chronic combined systolic and diastolic heart failure 01/29/2019 1-28-19 Left Ventricle Moderate decreased ejection fraction at 30%. Normal left ventricular cavity size. Normal wall thickness observed. Grade I (mild) left ventricular diastolic dysfunction consistent with impaired relaxation. Normal left atrial pressure. Moderate, global hypokinesis(see wall scoring diagram). Right Ventricle Normal cavity size, wall thickness and ejection fraction. Wall motion n    Chronic pancreatitis 1/28/2019    Colon polyps     approx 5 yrs ago    Coronary artery disease due to calcified coronary lesion 05/08/2015    5 stents on ASA      Diabetic polyneuropathy associated with type 2 diabetes mellitus 9/2/2015    Diverticulosis 1/28/2019    Essential hypertension 1/28/2019    Former smoker 8/26/2015    Healed ulcer of left foot on examination 6/20/2017    Lymphedema of both lower extremities 1/29/2019    Mixed hyperlipidemia 5/8/2015    Morbid obesity with BMI of 50.0-59.9, adult 5/8/2015    Onychomycosis of multiple toenails with type 2 diabetes mellitus and peripheral neuropathy 6/20/2017    Pseudocyst of pancreas 1/28/2019 1-28-19 Liver has a cirrhotic morphology with no focal lesions.  Significant interval increase in ascites when compared to prior exam which may account for patient's abdominal distension.  Hypodense air-fluid collection along the body of the pancreas which is slightly smaller when compared to prior CT.  Findings may relate to  "pancreatic necrosis with pancreatic pseudocysts with infected pseudocyst    Sleep apnea 8/26/2015    Status post bariatric surgery 1/11/2016       With regards to the diabetes:  Current symptoms/problems include hyperglycemia and have been worsening. Symptoms have been present for several months. Patient was diagnosed with cirrhosis with recurrent ascites and chronic pancreatitis in the past, initially attributed to EtOH intake. He was later found to have a portal vein thrombosis and underwent angioplasty with resulting improvement in the ascites. Patient was recently admitted to [facility] from 2/23/2020-3/3/2020 for acute cholecystitis with sepsis and bacteremia.  Despite our previous visit, and seeing our diabetes education team, he is still not taking his insulin appropriately or monitoring his sugars.  When asked about why he is not bolusing with lunch and dinner, he reports a severe phobia of hypoglycemia.    The patient was initially diagnosed with Type 2 diabetes mellitus:  2016.    Known diabetic complications: peripheral neuropathy  Cardiovascular risk factors: advanced age (older than 55 for men, 65 for women), diabetes mellitus, dyslipidemia, male gender, obesity (BMI >= 30 kg/m2) and sedentary lifestyle  Current diabetic medications include:   1) Levemir 20 units AM  2) Novolog 8 units TID w/ meals (only taking once per day - usually takes 30 minutes prior to his breakfast). He is terrified of low sugars.    He typically injects both shots of insulin when he first wakes up. He does not always eat breakfast right away. He does not check his sugars or give any additional novolog throughout the day despite being counseled to do this previously. Injects into his abdomen.     Previous meds:  Metformin    Prior visit with dietician: yes  Current diet: Admits to poor diet.  Breakfast: eggs, galo, grits  Lunch: Usually skips  Supper: "Pot-luck"; "too much fried food".   Snacks on rice/wheat " thins  Current exercise: none    Current monitoring regimen: Testing only once daily.  Home blood sugar records: 300-500  Any episodes of hypoglycemia? Last episode was November after taking novolog without eating.    Diabetic Health Maintenance:        HbA1c < 7.0%: No        Blood pressure <130/80:  No          BP Readings from Last 3 Encounters:   03/06/20 130/72   03/03/20 (!) 140/72   12/06/19 (!) 98/54           Low dose ASA?: No        Glucagon emergency kit?: No        Medical alert tag?: No    Wt Readings from Last 10 Encounters:   03/06/20 115.3 kg (254 lb 3.1 oz)   03/03/20 131.8 kg (290 lb 9.1 oz)   12/06/19 119.3 kg (262 lb 14.4 oz)   11/29/19 122.1 kg (269 lb 2.9 oz)   11/22/19 119.4 kg (263 lb 3.7 oz)   11/15/19 121.2 kg (267 lb 3.2 oz)   09/05/19 121.1 kg (267 lb)   08/26/19 121.3 kg (267 lb 8.5 oz)   08/23/19 122.5 kg (270 lb)   08/22/19 120.7 kg (266 lb)       Diabetes Management Status    Statin: Not taking  ACE/ARB: Not taking    Screening or Prevention Patient's value Goal Complete/Controlled?   HgA1C Testing and Control   Lab Results   Component Value Date    HGBA1C 12.4 (H) 02/23/2020      Annually/Less than 8% No   Lipid profile : 02/21/2020 Annually Yes   LDL control Lab Results   Component Value Date    LDLCALC 76.4 02/21/2020    Annually/Less than 100 mg/dl  Yes   Nephropathy screening Normal U Pr/Cr in 5/2019    Lab Results   Component Value Date    PROTEINUA 2+ (A) 02/26/2020    Annually Yes   Blood pressure BP Readings from Last 1 Encounters:   03/06/20 130/72    Less than 140/90 Yes   Dilated retinal exam : 11/22/2019 Annually Yes   Foot exam   : 11/15/2019 Annually Yes        Lab Results   Component Value Date    HGBA1C 12.4 (H) 02/23/2020    HGBA1C 12.5 (H) 02/21/2020    HGBA1C 9.5 (H) 11/15/2019    HGBA1C 7.1 (H) 05/23/2019    HGBA1C 7.4 (H) 03/18/2019    HGBA1C 6.9 (H) 01/28/2019    HGBA1C 6.7 (H) 11/13/2015    HGBA1C 6.7 (H) 09/30/2015    HGBA1C 8.3 (H) 07/10/2015     He has  chronic bilateral lymphedema and is being seen in a therapy clinic for lymphedema patients. Following regularly with wound care as well.     Reviewed past medical, family, social history and updated as appropriate.    Review of Systems   Constitutional: Negative for unexpected weight change.   Eyes: Negative for visual disturbance.   Respiratory: Negative for shortness of breath.    Cardiovascular: Negative for chest pain.   Gastrointestinal: Negative for abdominal pain.   Genitourinary: Negative for urgency.   Musculoskeletal: Negative for arthralgias.   Skin: Negative for wound.   Neurological: Negative for headaches.   Hematological: Does not bruise/bleed easily.   Psychiatric/Behavioral: Negative for sleep disturbance.     Objective:     Vitals:    03/06/20 1438   BP: 130/72   Pulse: 74   Resp: 18     BP Readings from Last 5 Encounters:   03/06/20 130/72   03/03/20 (!) 140/72   12/06/19 (!) 98/54   11/29/19 122/74   11/22/19 122/70         Physical Exam   Constitutional: He is oriented to person, place, and time. He appears well-developed. No distress.   HENT:   Right Ear: External ear normal.   Left Ear: External ear normal.   Nose: Nose normal.   Morbidly obese   Eyes: Conjunctivae are normal. Right eye exhibits no discharge. Left eye exhibits no discharge.   Neck: No tracheal deviation present. No thyromegaly present.   Cardiovascular: Normal rate.   No murmur heard.  Irregular rhythm; skipped beat every 3rd cardiac cycle (trigeminy?)   Pulmonary/Chest: Effort normal and breath sounds normal.   Abdominal: Soft. He exhibits no mass. There is no tenderness.   Musculoskeletal: He exhibits edema (4+ bilateral leg edema).   No digital clubbing or extremity cyanosis   Neurological: He is alert and oriented to person, place, and time. Coordination normal.   Skin: No rash noted.   No subcutaneous nodules noted.   Psychiatric: He has a normal mood and affect. His behavior is normal.   Alert and oriented to person,  place, and situation.   Nursing note and vitals reviewed.  Foot exam: Deferred - done recently by other providers    Wt Readings from Last 10 Encounters:   03/06/20 1438 115.3 kg (254 lb 3.1 oz)   03/03/20 0350 131.8 kg (290 lb 9.1 oz)   02/28/20 1510 115.5 kg (254 lb 10.1 oz)   02/27/20 0200 115.5 kg (254 lb 10.1 oz)   02/25/20 2330 112.9 kg (248 lb 14.4 oz)   02/25/20 0500 106.5 kg (234 lb 12.6 oz)   02/23/20 1212 108.9 kg (240 lb)   12/06/19 0934 119.3 kg (262 lb 14.4 oz)   11/29/19 1320 122.1 kg (269 lb 2.9 oz)   11/22/19 0920 119.4 kg (263 lb 3.7 oz)   11/15/19 1049 121.2 kg (267 lb 3.2 oz)   09/05/19 1415 121.1 kg (267 lb)   08/26/19 1047 121.3 kg (267 lb 8.5 oz)   08/23/19 1315 122.5 kg (270 lb)   08/22/19 1437 120.7 kg (266 lb)       Lab Results   Component Value Date    HGBA1C 12.4 (H) 02/23/2020    HGBA1C 12.5 (H) 02/21/2020    HGBA1C 9.5 (H) 11/15/2019    HGBA1C 7.1 (H) 05/23/2019     Lab Results   Component Value Date    CHOL 143 02/21/2020    CHOL 85 (L) 01/28/2019    HDL 49 02/21/2020    HDL 31 (L) 01/28/2019    LDLCALC 76.4 02/21/2020    LDLCALC 42.8 (L) 01/28/2019    TRIG 88 02/21/2020    TRIG 56 01/28/2019    CHOLHDL 34.3 02/21/2020    CHOLHDL 36.5 01/28/2019     Lab Results   Component Value Date     (L) 03/03/2020    K 4.2 03/03/2020     03/03/2020    CO2 20 (L) 03/03/2020     (H) 03/03/2020    BUN 40 (H) 03/03/2020    CREATININE 2.7 (H) 03/03/2020    CALCIUM 7.8 (L) 03/03/2020    PROT 4.9 (L) 03/02/2020    ALBUMIN 1.9 (L) 03/02/2020    BILITOT 0.3 03/02/2020    ALKPHOS 260 (H) 03/02/2020    AST 40 03/02/2020    ALT 23 03/02/2020    ANIONGAP 8 03/03/2020    ESTGFRAFRICA 27 (A) 03/03/2020    EGFRNONAA 24 (A) 03/03/2020    TSH 2.211 01/28/2019        Assessment/Plan:     Type 2 diabetes mellitus with stage 3 chronic kidney disease, without long-term current use of insulin  Reviewed goals of therapy are to get the best control we can without hypoglycemia    Seeing CDE in 2 weeks  for follow-up.    I counseled him that he needs to start taking care of his diabetes and in hearing to the recommendations that we give in order to prevent additional complications such as worsening kidney disease, eye disease, or neuropathy.    Medication changes:   Increase: Levemir 20 > 25 units daily  Advised to start taking his NovoLog 3 times per day with meals instead of just with breakfast.    Reviewed patient's current insulin regimen. Clarified proper insulin dose and timing in relation to meals, not giving novolog insulin unless he plans to eat and using novolog to cover supper as well. Insulin injection sites and proper rotation instructed.      Advised frequent self blood glucose monitoring. Patient encouraged to document glucose results and bring them to every clinic visit.  I advised that he needs to start checking his blood sugar 4 times per day minimum, and recording them to bring to clinic.  We are limited in how we can adjust his insulin doses without adequate blood glucose data.    Hypoglycemia precautions discussed. Glucagon emergency pen prescribed.    Close adherence to lifestyle changes recommended.      Eyes: UTD; no retinopathy on recent exam  Feet: Foot exam up to date  Kidneys: Urine protein elevated on UA. Would defer checking microalbumin/Cr until DM under better control.  HbA1c: Due in 3 months.  Lipids: at goal  ASA: On eliquis    Non compliance with medical treatment  He does not seem to be following the recommendations of his physicians regarding diabetes care. I explained to him the importance of adhering to his insulin regimen.    Acute renal failure with specified lesion  Will need to make cautious adjustments due to decreased GFR.      RTC in 6 weeks

## 2020-03-06 NOTE — PROGRESS NOTES
Patient, Jian Arrieta (MRN #[id number]), presented with a recorded BMI of 39.81 kg/m^2 and a documented comorbidity(s):  - Diabetes Mellitus Type 2  to which the severe obesity is a contributing factor. This is consistent with the definition of severe obesity (BMI 35.0-39.9) with comorbidity (ICD-10 E66.01, Z68.35). The patient's severe obesity was monitored, evaluated, addressed and/or treated. This addendum to the medical record is made on 03/06/2020.

## 2020-03-06 NOTE — ASSESSMENT & PLAN NOTE
Reviewed goals of therapy are to get the best control we can without hypoglycemia    Seeing CDE in 2 weeks for follow-up.    I counseled him that he needs to start taking care of his diabetes and in hearing to the recommendations that we give in order to prevent additional complications such as worsening kidney disease, eye disease, or neuropathy.    Medication changes:   Increase: Levemir 20 > 25 units daily  Advised to start taking his NovoLog 3 times per day with meals instead of just with breakfast.    Reviewed patient's current insulin regimen. Clarified proper insulin dose and timing in relation to meals, not giving novolog insulin unless he plans to eat and using novolog to cover supper as well. Insulin injection sites and proper rotation instructed.      Advised frequent self blood glucose monitoring. Patient encouraged to document glucose results and bring them to every clinic visit.  I advised that he needs to start checking his blood sugar 4 times per day minimum, and recording them to bring to clinic.  We are limited in how we can adjust his insulin doses without adequate blood glucose data.    Hypoglycemia precautions discussed. Glucagon emergency pen prescribed.    Close adherence to lifestyle changes recommended.      Eyes: UTD; no retinopathy on recent exam  Feet: Foot exam up to date  Kidneys: Urine protein elevated on UA. Would defer checking microalbumin/Cr until DM under better control.  HbA1c: Due in 3 months.  Lipids: at goal  ASA: On eliquis

## 2020-03-06 NOTE — PATIENT INSTRUCTIONS
Increase Levemir to 25 units  Make sure to take your novolog 8 units three times per day before your meals.  Monitor blood sugars 4 times per day.

## 2020-03-10 ENCOUNTER — LAB VISIT (OUTPATIENT)
Dept: LAB | Facility: HOSPITAL | Age: 65
End: 2020-03-10
Attending: INTERNAL MEDICINE
Payer: MEDICARE

## 2020-03-10 ENCOUNTER — TELEPHONE (OUTPATIENT)
Dept: PRIMARY CARE CLINIC | Facility: CLINIC | Age: 65
End: 2020-03-10

## 2020-03-10 ENCOUNTER — OFFICE VISIT (OUTPATIENT)
Dept: PRIMARY CARE CLINIC | Facility: CLINIC | Age: 65
End: 2020-03-10
Payer: MEDICARE

## 2020-03-10 VITALS
BODY MASS INDEX: 39.19 KG/M2 | TEMPERATURE: 97 F | WEIGHT: 250.25 LBS | SYSTOLIC BLOOD PRESSURE: 115 MMHG | HEART RATE: 104 BPM | DIASTOLIC BLOOD PRESSURE: 72 MMHG | OXYGEN SATURATION: 99 %

## 2020-03-10 DIAGNOSIS — R65.21 SEPSIS DUE TO ESCHERICHIA COLI WITH ACUTE RENAL FAILURE, TUBULAR NECROSIS, AND SEPTIC SHOCK: ICD-10-CM

## 2020-03-10 DIAGNOSIS — K76.6 PORTAL HYPERTENSION DUE TO OBSTRUCTION OF EXTRAHEPATIC PORTAL VEIN: Chronic | ICD-10-CM

## 2020-03-10 DIAGNOSIS — K86.3 PSEUDOCYST OF PANCREAS: ICD-10-CM

## 2020-03-10 DIAGNOSIS — Z79.01 CURRENT USE OF LONG TERM ANTICOAGULATION: ICD-10-CM

## 2020-03-10 DIAGNOSIS — E11.69 TYPE 2 DIABETES MELLITUS WITH OTHER SPECIFIED COMPLICATION, WITH LONG-TERM CURRENT USE OF INSULIN: ICD-10-CM

## 2020-03-10 DIAGNOSIS — N17.0 ACUTE RENAL FAILURE WITH TUBULAR NECROSIS: ICD-10-CM

## 2020-03-10 DIAGNOSIS — E66.9 OBESITY (BMI 30-39.9): ICD-10-CM

## 2020-03-10 DIAGNOSIS — I81 PORTAL HYPERTENSION DUE TO OBSTRUCTION OF EXTRAHEPATIC PORTAL VEIN: Chronic | ICD-10-CM

## 2020-03-10 DIAGNOSIS — K74.00 HEPATIC FIBROSIS: ICD-10-CM

## 2020-03-10 DIAGNOSIS — N43.3 HYDROCELE IN ADULT: ICD-10-CM

## 2020-03-10 DIAGNOSIS — I89.0 LYMPHEDEMA OF BOTH LOWER EXTREMITIES: Chronic | ICD-10-CM

## 2020-03-10 DIAGNOSIS — A41.51 SEPSIS DUE TO ESCHERICHIA COLI WITH ACUTE RENAL FAILURE, TUBULAR NECROSIS, AND SEPTIC SHOCK: ICD-10-CM

## 2020-03-10 DIAGNOSIS — R78.81 BACTEREMIA DUE TO ESCHERICHIA COLI: ICD-10-CM

## 2020-03-10 DIAGNOSIS — I48.0 PAROXYSMAL ATRIAL FIBRILLATION: ICD-10-CM

## 2020-03-10 DIAGNOSIS — B96.20 BACTEREMIA DUE TO ESCHERICHIA COLI: ICD-10-CM

## 2020-03-10 DIAGNOSIS — N17.0 SEPSIS DUE TO ESCHERICHIA COLI WITH ACUTE RENAL FAILURE, TUBULAR NECROSIS, AND SEPTIC SHOCK: ICD-10-CM

## 2020-03-10 DIAGNOSIS — K81.9 CHOLECYSTITIS: Primary | ICD-10-CM

## 2020-03-10 DIAGNOSIS — N18.30 CHRONIC KIDNEY DISEASE, STAGE 3: Chronic | ICD-10-CM

## 2020-03-10 DIAGNOSIS — Z79.4 TYPE 2 DIABETES MELLITUS WITH OTHER SPECIFIED COMPLICATION, WITH LONG-TERM CURRENT USE OF INSULIN: ICD-10-CM

## 2020-03-10 PROBLEM — I21.4 NSTEMI (NON-ST ELEVATED MYOCARDIAL INFARCTION): Status: RESOLVED | Noted: 2019-03-16 | Resolved: 2020-03-10

## 2020-03-10 PROBLEM — Z98.890 S/P ABDOMINAL PARACENTESIS: Status: RESOLVED | Noted: 2019-06-05 | Resolved: 2020-03-10

## 2020-03-10 PROBLEM — D64.9 SYMPTOMATIC ANEMIA: Status: RESOLVED | Noted: 2019-05-22 | Resolved: 2020-03-10

## 2020-03-10 LAB
ALBUMIN SERPL BCP-MCNC: 2.7 G/DL (ref 3.5–5.2)
ALP SERPL-CCNC: 218 U/L (ref 55–135)
ALT SERPL W/O P-5'-P-CCNC: 48 U/L (ref 10–44)
ANION GAP SERPL CALC-SCNC: 8 MMOL/L (ref 8–16)
AST SERPL-CCNC: 61 U/L (ref 10–40)
BASOPHILS # BLD AUTO: 0.04 K/UL (ref 0–0.2)
BASOPHILS NFR BLD: 0.6 % (ref 0–1.9)
BILIRUB SERPL-MCNC: 0.3 MG/DL (ref 0.1–1)
BUN SERPL-MCNC: 22 MG/DL (ref 8–23)
CALCIUM SERPL-MCNC: 8.1 MG/DL (ref 8.7–10.5)
CHLORIDE SERPL-SCNC: 108 MMOL/L (ref 95–110)
CO2 SERPL-SCNC: 23 MMOL/L (ref 23–29)
CREAT SERPL-MCNC: 2.2 MG/DL (ref 0.5–1.4)
DIFFERENTIAL METHOD: ABNORMAL
EOSINOPHIL # BLD AUTO: 0.1 K/UL (ref 0–0.5)
EOSINOPHIL NFR BLD: 2 % (ref 0–8)
ERYTHROCYTE [DISTWIDTH] IN BLOOD BY AUTOMATED COUNT: 13.6 % (ref 11.5–14.5)
EST. GFR  (AFRICAN AMERICAN): 35 ML/MIN/1.73 M^2
EST. GFR  (NON AFRICAN AMERICAN): 30 ML/MIN/1.73 M^2
GLUCOSE SERPL-MCNC: 490 MG/DL (ref 70–110)
HCT VFR BLD AUTO: 33.5 % (ref 40–54)
HGB BLD-MCNC: 10.4 G/DL (ref 14–18)
IMM GRANULOCYTES # BLD AUTO: 0.02 K/UL (ref 0–0.04)
IMM GRANULOCYTES NFR BLD AUTO: 0.3 % (ref 0–0.5)
LYMPHOCYTES # BLD AUTO: 1.7 K/UL (ref 1–4.8)
LYMPHOCYTES NFR BLD: 25.9 % (ref 18–48)
MCH RBC QN AUTO: 28.9 PG (ref 27–31)
MCHC RBC AUTO-ENTMCNC: 31 G/DL (ref 32–36)
MCV RBC AUTO: 93 FL (ref 82–98)
MONOCYTES # BLD AUTO: 0.5 K/UL (ref 0.3–1)
MONOCYTES NFR BLD: 6.9 % (ref 4–15)
NEUTROPHILS # BLD AUTO: 4.3 K/UL (ref 1.8–7.7)
NEUTROPHILS NFR BLD: 64.3 % (ref 38–73)
NRBC BLD-RTO: 0 /100 WBC
PLATELET # BLD AUTO: 278 K/UL (ref 150–350)
PMV BLD AUTO: 11 FL (ref 9.2–12.9)
POTASSIUM SERPL-SCNC: 4.6 MMOL/L (ref 3.5–5.1)
PROT SERPL-MCNC: 6 G/DL (ref 6–8.4)
RBC # BLD AUTO: 3.6 M/UL (ref 4.6–6.2)
SODIUM SERPL-SCNC: 139 MMOL/L (ref 136–145)
WBC # BLD AUTO: 6.65 K/UL (ref 3.9–12.7)

## 2020-03-10 PROCEDURE — 99215 OFFICE O/P EST HI 40 MIN: CPT | Mod: S$GLB,,, | Performed by: INTERNAL MEDICINE

## 2020-03-10 PROCEDURE — 36415 COLL VENOUS BLD VENIPUNCTURE: CPT

## 2020-03-10 PROCEDURE — 1101F PT FALLS ASSESS-DOCD LE1/YR: CPT | Mod: CPTII,S$GLB,, | Performed by: INTERNAL MEDICINE

## 2020-03-10 PROCEDURE — 3074F PR MOST RECENT SYSTOLIC BLOOD PRESSURE < 130 MM HG: ICD-10-PCS | Mod: CPTII,S$GLB,, | Performed by: INTERNAL MEDICINE

## 2020-03-10 PROCEDURE — 99999 PR PBB SHADOW E&M-EST. PATIENT-LVL IV: ICD-10-PCS | Mod: PBBFAC,,, | Performed by: INTERNAL MEDICINE

## 2020-03-10 PROCEDURE — 99499 UNLISTED E&M SERVICE: CPT | Mod: S$GLB,,, | Performed by: INTERNAL MEDICINE

## 2020-03-10 PROCEDURE — 3046F PR MOST RECENT HEMOGLOBIN A1C LEVEL > 9.0%: ICD-10-PCS | Mod: CPTII,S$GLB,, | Performed by: INTERNAL MEDICINE

## 2020-03-10 PROCEDURE — 1101F PR PT FALLS ASSESS DOC 0-1 FALLS W/OUT INJ PAST YR: ICD-10-PCS | Mod: CPTII,S$GLB,, | Performed by: INTERNAL MEDICINE

## 2020-03-10 PROCEDURE — 3008F PR BODY MASS INDEX (BMI) DOCUMENTED: ICD-10-PCS | Mod: CPTII,S$GLB,, | Performed by: INTERNAL MEDICINE

## 2020-03-10 PROCEDURE — 3074F SYST BP LT 130 MM HG: CPT | Mod: CPTII,S$GLB,, | Performed by: INTERNAL MEDICINE

## 2020-03-10 PROCEDURE — 3046F HEMOGLOBIN A1C LEVEL >9.0%: CPT | Mod: CPTII,S$GLB,, | Performed by: INTERNAL MEDICINE

## 2020-03-10 PROCEDURE — 85025 COMPLETE CBC W/AUTO DIFF WBC: CPT

## 2020-03-10 PROCEDURE — 3078F PR MOST RECENT DIASTOLIC BLOOD PRESSURE < 80 MM HG: ICD-10-PCS | Mod: CPTII,S$GLB,, | Performed by: INTERNAL MEDICINE

## 2020-03-10 PROCEDURE — 80053 COMPREHEN METABOLIC PANEL: CPT

## 2020-03-10 PROCEDURE — 99999 PR PBB SHADOW E&M-EST. PATIENT-LVL IV: CPT | Mod: PBBFAC,,, | Performed by: INTERNAL MEDICINE

## 2020-03-10 PROCEDURE — 3078F DIAST BP <80 MM HG: CPT | Mod: CPTII,S$GLB,, | Performed by: INTERNAL MEDICINE

## 2020-03-10 PROCEDURE — 99215 PR OFFICE/OUTPT VISIT, EST, LEVL V, 40-54 MIN: ICD-10-PCS | Mod: S$GLB,,, | Performed by: INTERNAL MEDICINE

## 2020-03-10 PROCEDURE — 3008F BODY MASS INDEX DOCD: CPT | Mod: CPTII,S$GLB,, | Performed by: INTERNAL MEDICINE

## 2020-03-10 PROCEDURE — 99499 RISK ADDL DX/OHS AUDIT: ICD-10-PCS | Mod: S$GLB,,, | Performed by: INTERNAL MEDICINE

## 2020-03-10 RX ORDER — METRONIDAZOLE 500 MG/1
500 TABLET ORAL 3 TIMES DAILY
COMMUNITY
End: 2021-03-31

## 2020-03-10 NOTE — TELEPHONE ENCOUNTER
Spoke with Erika from [employer], she will try and get info on an outside company that does into the home to do wraps for lymphedema.

## 2020-03-10 NOTE — PROGRESS NOTES
[facility]   New Visit Progress Note   Recent Hospital Discharge     PRESENTING HISTORY     Chief Complaint/Reason for Admission:  Follow up Hospital Discharge   PCP: Leon Barajas DO    History of Present Illness:  Mr. Jian Arrieta is a 64 y.o. male who was recently admitted to the hospital.    [facility] Medicine  Discharge Summary        Patient Name: Jian Arrieta  MRN: [id number]  Admission Date: 2/23/2020  Hospital Length of Stay: 6 days  Discharge Date and Time: No discharge date for patient encounter.  Attending Physician: Tien Arboleda DO   Discharging Provider: Tien Arboleda DO  Primary Care Provider: Leon Barajas DO  __________________________________________________________________    Today:  [facility] to [facility] for initial hospital follow up.  Recently hospitalized for septic shock related to acute cholecystitis.   Condition further complicated by E coli bacteremia and ELIEZER on CKD 3.   Underwent IR placement of cholecystostomy tube on 2/27/20.   Treated with Cefepime and Flagyl while hospitalized.  Responded well to above interventions and supportive care.  Discharged to home with assistance of [employer] [facility].  5 day course oral Flagyl prescribed upon discharge.     Hx significant for hepatic fibrosis, pancreatic pseudocyst, chronic pancreatitis, and extrahepatic portal vein stenosis (requiring balloon dilation) and associated large volume ascites requiring frequent outpatient paracentesis.     Accompanied today by his sister.  Patient is ambulatory and independent with ADL's.  Has missed several doses of Flagyl.  No longer taking Atorvastatin or Creon- med list updated.  Has Skilled Nursing with [facility] but he declined home PT/OT services.  No fever, N/V. Minimal abd pain at site of drain.  Significant LE lymphedema noted- patient not wearing compression stockings presently.    Review of Systems  General ROS: negative for chills,  fever or weight loss  Psychological ROS: negative for hallucination, depression or suicidal ideation  Ophthalmic ROS: negative for blurry vision, photophobia or eye pain  ENT ROS: negative for epistaxis, sore throat or rhinorrhea  + poor dentition   Respiratory ROS: no cough, shortness of breath, or wheezing  Cardiovascular ROS: no chest pain or dyspnea on exertion  Gastrointestinal ROS: + abdominal pain at site of drain, change in bowel habits, or black/ bloody stools  Genito-Urinary ROS: no dysuria, trouble voiding, or hematuria  Musculoskeletal ROS: negative for gait disturbance or muscular weakness  Neurological ROS: no syncope or seizures; no ataxia  Dermatological ROS: negative for pruritis, rash and jaundice          PAST HISTORY:     Past Medical History:   Diagnosis Date    Alcohol abuse     Anasarca 1/28/2019    Arthropathy associated with neurological disorder 9/2/2015    Atherosclerosis     Charcot foot due to diabetes mellitus     Chronic combined systolic and diastolic heart failure 01/29/2019 1-28-19 Left VentricleModerate decreased ejection fraction at 30%. Normal left ventricular cavity size. Normal wall thickness observed. Grade I (mild) left ventricular diastolic dysfunction consistent with impaired relaxation. Normal left atrial pressure. Moderate, global hypokinesis(see wall scoring diagram). Right VentricleNormal cavity size, wall thickness and ejection fraction. Wall motion n    Chronic pancreatitis 1/28/2019    Colon polyps     approx 5 yrs ago    Coronary artery disease due to calcified coronary lesion 05/08/2015    5 stents on ASA      Diabetic polyneuropathy associated with type 2 diabetes mellitus 9/2/2015    Diverticulosis 1/28/2019    DM type 2 with diabetic peripheral neuropathy 2/4/2019    Encounter for blood transfusion     Essential hypertension 1/28/2019    Former smoker 8/26/2015    Healed ulcer of left foot on examination 6/20/2017    Hydrocele     approx 1.5  yrs ago    Hypoalbuminemia 2/4/2019    Lymphedema of both lower extremities 1/29/2019    Mixed hyperlipidemia 5/8/2015    Morbid obesity with BMI of 50.0-59.9, adult 5/8/2015    Onychomycosis of multiple toenails with type 2 diabetes mellitus and peripheral neuropathy 6/20/2017    Perianal cyst     approx 2 yrs ago    Pseudocyst of pancreas 1/28/2019 1-28-19 Liver has a cirrhotic morphology with no focal lesions.  Significant interval increase in ascites when compared to prior exam which may account for patient's abdominal distension.  Hypodense air-fluid collection along the body of the pancreas which is slightly smaller when compared to prior CT.  Findings may relate to pancreatic necrosis with pancreatic pseudocysts with infected pseudocyst    Skin cancer     skin cancer    Sleep apnea 8/26/2015    Status post bariatric surgery 1/11/2016    Type 2 diabetes mellitus, with long-term current use of insulin 5/8/2015       Past Surgical History:   Procedure Laterality Date    ANGIOGRAPHY N/A 6/28/2019    Procedure: ANGIOGRAM-PV STENT;  Surgeon: Ewa Diagnostic Provider;  Location: [facility] OR;  Service: Radiology;  Laterality: N/A;    ANGIOPLASTY      total x5 stents    COLONOSCOPY N/A 10/6/2015    Procedure: COLONOSCOPY;  Surgeon: Shekhar Richards MD;  Location: [facility] ENDO ([address] [place] [address]);  Service: Endoscopy;  Laterality: N/A;  BMI over 55/2nd floor case    5 day hold Plavix, Dr Kwadwo Arroyo    CORONARY ANGIOGRAPHY Right 3/20/2019    Procedure: ANGIOGRAM, CORONARY ARTERY;  Surgeon: Bob Duque MD;  Location: [facility] CATH LAB;  Service: Cardiology;  Laterality: Right;    CORONARY ARTERY BYPASS GRAFT  2017    x3    CORONARY BYPASS GRAFT ANGIOGRAPHY  3/20/2019    Procedure: Bypass graft study;  Surgeon: Bob Duque MD;  Location: [facility] CATH LAB;  Service: Cardiology;;    CYST REMOVAL      ENDOSCOPIC ULTRASOUND OF UPPER GASTROINTESTINAL TRACT N/A 2/26/2020    Procedure: ULTRASOUND, UPPER GI TRACT,  ENDOSCOPIC;  Surgeon: Robbie Yang MD;  Location: [facility] ENDO;  Service: Endoscopy;  Laterality: N/A;    ESOPHAGOGASTRODUODENOSCOPY N/A 7/8/2019    Procedure: ESOPHAGOGASTRODUODENOSCOPY (EGD);  Surgeon: Huan Brumfield MD;  Location: [facility] ENDO;  Service: Endoscopy;  Laterality: N/A;    GASTRECTOMY      INSERTION OF DIALYSIS CATHETER N/A 5/17/2019    Procedure: pleurx;  Surgeon: Dosc Diagnostic Provider;  Location: [facility] OR [address] [place] [address];  Service: General;  Laterality: N/A;  Room 188/[place]    KNEE ARTHROSCOPY      perianal surgery      perianal cyst removed       Family History   Problem Relation Age of Onset    Cancer Mother     Cancer Father     Heart disease Father     Obesity Sister     Parkinsonism Brother     No Known Problems Paternal Grandmother     Cancer Paternal Grandfather     Cancer Brother     Diabetes Maternal Grandmother     Stroke Maternal Grandfather     Cirrhosis Neg Hx          MEDICATIONS & ALLERGIES:     Current Outpatient Medications on File Prior to Visit   Medication Sig Dispense Refill    apixaban (ELIQUIS) 5 mg Tab Take 1 tablet (5 mg total) by mouth 2 (two) times daily. 60 tablet 11    atorvastatin (LIPITOR) 40 MG tablet Take 40 mg by mouth once daily.      blood sugar diagnostic Strp Test strips to use with meter covered by insurance to check blood sugars twice daily. Insulin dependent diabetic. 200 strip 11    blood-glucose meter Misc Glucometer covered by insurance to check blood sugars at home.  Insulin dependent diabetic. 1 each 0    cyanocobalamin, vitamin B-12, 500 mcg Subl Place 1 tablet under the tongue every Monday.       ferrous sulfate (FEOSOL ORAL) Take 325 mg by mouth once daily.       furosemide (LASIX) 20 MG tablet Take 1 tablet (20 mg total) by mouth 2 (two) times daily. 60 tablet 11    glucagon, human recombinant, (GLUCAGON EMERGENCY KIT, HUMAN,) 1 mg SolR Inject 1 mg into the muscle as needed (For severely low sugar). 1 each 2    insulin aspart  "U-100 (NOVOLOG) 100 unit/mL injection Inject 8 Units into the skin 3 (three) times daily before meals.       insulin detemir U-100 (LEVEMIR FLEXTOUCH) 100 unit/mL (3 mL) SubQ InPn pen Inject 20 Units into the skin every evening. 3 mL 12    lancets Misc Lancets to use with meter covered by insurance to check blood sugars twice daily.  Insulin dependent diabetic. 200 each 11    lipase-protease-amylase (CREON) 36,000-114,000- 180,000 unit CpDR Take 1 capsule by mouth 3 (three) times daily. 270 capsule 3    metoprolol succinate (TOPROL-XL) 25 MG 24 hr tablet Take 1 tablet (25 mg total) by mouth once daily. 30 tablet 11    pen needle, diabetic (INSUPEN) 32 gauge x 1/4" Ndle 1 each by Misc.(Non-Drug; Combo Route) route 4 (four) times daily. 360 each 3     No current facility-administered medications on file prior to visit.         Review of patient's allergies indicates:  No Known Allergies    OBJECTIVE:     Vital Signs:  /72 (BP Location: Right arm, Patient Position: Sitting, BP Method: Small (Automatic))   Pulse 104   Temp 97.2 °F (36.2 °C) (Oral)   Wt 113.5 kg (250 lb 3.6 oz)   SpO2 99%   BMI 39.19 kg/m²   Wt Readings from Last 1 Encounters:   03/10/20 0913 113.5 kg (250 lb 3.6 oz)     Body mass index is 39.19 kg/m².        Physical Exam:  /72 (BP Location: Right arm, Patient Position: Sitting, BP Method: Small (Automatic))   Pulse 104   Temp 97.2 °F (36.2 °C) (Oral)   Wt 113.5 kg (250 lb 3.6 oz)   SpO2 99%   BMI 39.19 kg/m²   General appearance: alert, cooperative, no distress  Constitutional:Oriented to person, place, and time  + obese    HEENT: Normocephalic, atraumatic, neck symmetrical, no nasal discharge   + poor dentition  Eyes: conjunctivae/corneas clear, PERRL, EOM's intact  Lungs: clear to auscultation bilaterally, no dullness to percussion bilaterally  Heart: regular rate and rhythm without rub; no displacement of the PMI   Abdomen: soft, non-tender; bowel sounds normoactive; no " organomegaly  + cholecystostomy tube in place, draining  Extremities: extremities symmetric; no clubbing, cyanosis, + BLE lymphedema  Integument: Skin color, texture, turgor normal; no rashes; hair distrubution normal  Neurologic: Alert and oriented X 3, normal strength, normal coordination and gait  Psychiatric: no pressured speech; normal affect; no evidence of impaired cognition     Laboratory  Lab Results   Component Value Date    WBC 11.24 03/03/2020    HGB 12.1 (L) 03/03/2020    HCT 35.6 (L) 03/03/2020    MCV 85 03/03/2020     03/03/2020     BMP  Lab Results   Component Value Date     (L) 03/03/2020    K 4.2 03/03/2020     03/03/2020    CO2 20 (L) 03/03/2020    BUN 40 (H) 03/03/2020    CREATININE 2.7 (H) 03/03/2020    CALCIUM 7.8 (L) 03/03/2020    ANIONGAP 8 03/03/2020    ESTGFRAFRICA 27 (A) 03/03/2020    EGFRNONAA 24 (A) 03/03/2020     Lab Results   Component Value Date    ALT 23 03/02/2020    AST 40 03/02/2020     (H) 02/27/2020    ALKPHOS 260 (H) 03/02/2020    BILITOT 0.3 03/02/2020     Lab Results   Component Value Date    INR 1.0 02/25/2020    INR 1.0 02/23/2020    INR 1.2 07/15/2019     Lab Results   Component Value Date    HGBA1C 12.4 (H) 02/23/2020       Diagnostic Results:  US ABD 2/24/20:  Partially fluid-filled gallbladder with aggregated sludge.  Nonspecific borderline wall thickening potentially reactive in the setting of underlying hepatocellular dysfunction though inflammatory process not entirely excluded.    Mildly dilated extrahepatic duct without intrahepatic dilatation.  Although, this appears increased from most recent liver Doppler from July 2019, overall findings appear similar to that of comparison CT from June 2019.  Further evaluation with dedicated MRI with MRCP or ERCP could be performed for further evaluation.  Additionally, correlation with biliary serology recommended.    Mild perihepatic ascites.    This report was flagged in Epic as abnormal.    2 D  echo 6/27/2019:  · Mildly decreased left ventricular systolic function. The estimated ejection fraction is 45-50%  · Left ventricular diastolic dysfunction.  · Normal right ventricular systolic function.  · Normal central venous pressure (3 mm Hg).  · Extremely technically challenging study, poor endocardial visualization, would recommend repeating with contrast if additional information is desired.  ·   ASSESSMENT & PLAN:     Cholecystitis  Sepsis due to Escherichia coli with acute renal failure, tubular necrosis, and septic shock  Bacteremia due to Escherichia coli  - cholecystectomy tube in place, will be managed by Dr Rowe/Surgery team  - prescribed 5 day course oral Flagyl to complete as outpatient but has missed several doses  - labs today    Acute renal failure with tubular necrosis  Chronic kidney disease, stage 3  - will see Dr Askew 3/17/20  -     CBC auto differential; Future; Expected date: 03/11/2020  -     Comprehensive metabolic panel; Future; Expected date: 03/10/2020  -     Ambulatory referral/consult to Nephrology; Future; Expected date: 03/17/2020    Hydrocele in adult  - previously deemed non operative by community urologist, patient requesting second opinion   -     Ambulatory referral/consult to Urology; Future; Expected date: 03/17/2020    Paroxysmal atrial fibrillation  Current use of long term anticoagulation  - rate controlled on Metoprolol  - anticoagulated on Eliquis     Hepatic fibrosis  Portal hypertension due to obstruction of extrahepatic portal vein  Pseudocyst of pancreas  - managed by Dr Rowe    Lymphedema of both lower extremities  - not wearing compression hose as directed  - will arrange Lymphedema therapy at home while on [facility] services, can later seek treatment at [facility] or [facility] outpatient lymphedema [facility]    Obesity (BMI 30-39.9)    Type 2 diabetes mellitus with other specified complication, with long-term current use of insulin  - HgBA1C  significantly elevated at 12.4, non compliance a key contributing factor  - managed by Endocrine team       I will [facility] 3/24/20.    Instructions for the patient:      Scheduled Follow-up :  Future Appointments   Date Time Provider Department Center   3/13/2020  1:00 PM SON Rowe MD [facility] TUMOR [facility]   3/17/2020 10:45 AM Leticia Askew MD [facility] OCC   3/19/2020  1:00 PM CGMS, ENDOCRINE [facility] ENDODIA [facility]   3/20/2020 11:30 AM Melissa Silver RD, CDE [facility] DIABETE [facility]   3/24/2020 10:30 AM Chanda Hanley MD [facility] IMPRI [facility]   3/25/2020  8:00 AM CGMS, ENDOCRINE [facility] ENDODIA [facility]   4/7/2020 10:30 AM Huan Carney MD [facility] ENDODIA [facility]   4/28/2020 10:00 AM Tiffanie Caba MD [facility] UROLOGY [facility]   5/6/2020 10:00 AM Leon Barajas DO [facility] IM [place]       Post Visit Medication List:     Medication List           Accurate as of March 10, 2020 11:50 AM. If you have any questions, ask your nurse or doctor.               CONTINUE taking these medications    apixaban 5 mg Tab  Commonly known as:  ELIQUIS  Take 1 tablet (5 mg total) by mouth 2 (two) times daily.     blood sugar diagnostic Strp  Test strips to use with meter covered by insurance to check blood sugars twice daily. Insulin dependent diabetic.     blood-glucose meter Misc  Glucometer covered by insurance to check blood sugars at home.  Insulin dependent diabetic.     cyanocobalamin (vitamin B-12) 500 mcg Subl     FEOSOL ORAL     furosemide 20 MG tablet  Commonly known as:  LASIX  Take 1 tablet (20 mg total) by mouth 2 (two) times daily.     glucagon (human recombinant) 1 mg Solr  Commonly known as:  GLUCAGON EMERGENCY KIT (HUMAN)  Inject 1 mg into the muscle as needed (For severely low sugar).     insulin aspart U-100 100 unit/mL injection  Commonly known as:  NOVOLOG     insulin detemir U-100 100 unit/mL (3 mL) Inpn pen  Commonly known as:  LEVEMIR FLEXTOUCH  Inject 20 Units  "into the skin every evening.     lancets Misc  Lancets to use with meter covered by insurance to check blood sugars twice daily.  Insulin dependent diabetic.     metoprolol succinate 25 MG 24 hr tablet  Commonly known as:  TOPROL-XL  Take 1 tablet (25 mg total) by mouth once daily.     metroNIDAZOLE 500 MG tablet  Commonly known as:  FLAGYL     pen needle, diabetic 32 gauge x 1/4" Ndle  Commonly known as:  INSUPEN  1 each by Misc.(Non-Drug; Combo Route) route 4 (four) times daily.        STOP taking these medications    atorvastatin 40 MG tablet  Commonly known as:  LIPITOR  Stopped by:  Chanda Hanley MD            Signing Physician:  Chanda Hanley MD  "

## 2020-03-10 NOTE — TELEPHONE ENCOUNTER
----- Message from Chanda Hanley MD sent at 3/10/2020  2:03 PM CDT -----  1. Blood glucose is significantly elevated at 490. Did he take his blood glucose this AM- what was it at home? Did he take his AM Diabetes meds?  2. Liver function studies are slightly elevated, this can be discussed with Dr Rowe. Will follow.  3. Anemia has worsened slightly. Will check this again when I see him next.  Please ask him to sign on to the portal to view the results.  
Notified patient of lab results. Notified patient that his results will be released to the patient portal. Patient verbalized understanding.   
Detail Level: Generalized
Detail Level: Zone

## 2020-03-13 ENCOUNTER — OFFICE VISIT (OUTPATIENT)
Dept: NEUROLOGY | Facility: HOSPITAL | Age: 65
End: 2020-03-13
Attending: SURGERY
Payer: MEDICARE

## 2020-03-13 VITALS
WEIGHT: 248.56 LBS | BODY MASS INDEX: 39.01 KG/M2 | HEART RATE: 100 BPM | HEIGHT: 67 IN | DIASTOLIC BLOOD PRESSURE: 67 MMHG | SYSTOLIC BLOOD PRESSURE: 117 MMHG

## 2020-03-13 DIAGNOSIS — I50.42 CHRONIC COMBINED SYSTOLIC AND DIASTOLIC HEART FAILURE: ICD-10-CM

## 2020-03-13 DIAGNOSIS — T85.518A CHOLECYSTOSTOMY TUBE DYSFUNCTION, INITIAL ENCOUNTER: ICD-10-CM

## 2020-03-13 DIAGNOSIS — E66.9 OBESITY (BMI 30-39.9): Primary | ICD-10-CM

## 2020-03-13 DIAGNOSIS — K74.00 HEPATIC FIBROSIS: ICD-10-CM

## 2020-03-13 DIAGNOSIS — N18.30 CHRONIC KIDNEY DISEASE, STAGE 3: Chronic | ICD-10-CM

## 2020-03-13 PROCEDURE — 99213 OFFICE O/P EST LOW 20 MIN: CPT | Performed by: SURGERY

## 2020-03-13 NOTE — PATIENT INSTRUCTIONS
Follow Up Visit: Follow-up with Dr. Marino in interventional Radiology in 3 weeks for tube cholangiogram and tube removal if satisfactory. Message sent to IR to schedule.    Follow Up Visit: Follow-up with PCP    Notes From Physician:   Continue with wound care, physical therapy, and rehab.  2 g sodium 1500 cc a day fluid restriction    Return Clinic Appointment: in 6-8 weeks with Dr. Rowe - appointment made

## 2020-03-16 ENCOUNTER — TELEPHONE (OUTPATIENT)
Dept: NEUROLOGY | Facility: HOSPITAL | Age: 65
End: 2020-03-16

## 2020-03-16 DIAGNOSIS — E11.22 TYPE 2 DIABETES MELLITUS WITH STAGE 3 CHRONIC KIDNEY DISEASE, WITHOUT LONG-TERM CURRENT USE OF INSULIN: Primary | ICD-10-CM

## 2020-03-16 DIAGNOSIS — N18.30 TYPE 2 DIABETES MELLITUS WITH STAGE 3 CHRONIC KIDNEY DISEASE, WITHOUT LONG-TERM CURRENT USE OF INSULIN: Primary | ICD-10-CM

## 2020-03-16 DIAGNOSIS — T85.518A CHOLECYSTOSTOMY TUBE DYSFUNCTION, INITIAL ENCOUNTER: Primary | ICD-10-CM

## 2020-03-16 NOTE — TELEPHONE ENCOUNTER
Spoke with Kate from  [employer]. Patient has a biliary drain, and the valve broke this weekend. Kate had to take the bag off to flush the drain this morning. Patient is not able to flush the drain on his own, because valve needs to be replaced. Spoke with Dr. Hogue, advised that patient needs to have a cholangiogram done. Spoke to Ila & Dr. Gonzalez, they are having the patient come in today so Dr. Gonzalez, can look at the valve and drain. Patient and Kate has been notified.

## 2020-03-17 ENCOUNTER — HOSPITAL ENCOUNTER (EMERGENCY)
Facility: HOSPITAL | Age: 65
Discharge: HOME | End: 2020-03-17
Attending: EMERGENCY MEDICINE
Payer: MEDICARE

## 2020-03-17 VITALS
RESPIRATION RATE: 20 BRPM | HEART RATE: 96 BPM | HEIGHT: 67 IN | OXYGEN SATURATION: 99 % | TEMPERATURE: 99 F | WEIGHT: 248 LBS | DIASTOLIC BLOOD PRESSURE: 69 MMHG | SYSTOLIC BLOOD PRESSURE: 139 MMHG | BODY MASS INDEX: 38.92 KG/M2

## 2020-03-17 DIAGNOSIS — T85.518A CHOLECYSTOSTOMY TUBE DYSFUNCTION, INITIAL ENCOUNTER: ICD-10-CM

## 2020-03-17 DIAGNOSIS — R65.21 SEPSIS DUE TO ESCHERICHIA COLI WITH ACUTE RENAL FAILURE, TUBULAR NECROSIS, AND SEPTIC SHOCK: Primary | ICD-10-CM

## 2020-03-17 DIAGNOSIS — A41.51 SEPSIS DUE TO ESCHERICHIA COLI WITH ACUTE RENAL FAILURE, TUBULAR NECROSIS, AND SEPTIC SHOCK: Primary | ICD-10-CM

## 2020-03-17 DIAGNOSIS — N17.0 SEPSIS DUE TO ESCHERICHIA COLI WITH ACUTE RENAL FAILURE, TUBULAR NECROSIS, AND SEPTIC SHOCK: Primary | ICD-10-CM

## 2020-03-17 LAB
ALBUMIN SERPL BCP-MCNC: 2.8 G/DL (ref 3.5–5.2)
ALP SERPL-CCNC: 151 U/L (ref 55–135)
ALT SERPL W/O P-5'-P-CCNC: 27 U/L (ref 10–44)
ANION GAP SERPL CALC-SCNC: 7 MMOL/L (ref 8–16)
APTT BLDCRRT: 27.4 SEC (ref 21–32)
AST SERPL-CCNC: 24 U/L (ref 10–40)
BASOPHILS # BLD AUTO: 0.02 K/UL (ref 0–0.2)
BASOPHILS NFR BLD: 0.3 % (ref 0–1.9)
BILIRUB SERPL-MCNC: 0.4 MG/DL (ref 0.1–1)
BUN SERPL-MCNC: 20 MG/DL (ref 8–23)
CALCIUM SERPL-MCNC: 8.6 MG/DL (ref 8.7–10.5)
CHLORIDE SERPL-SCNC: 109 MMOL/L (ref 95–110)
CO2 SERPL-SCNC: 22 MMOL/L (ref 23–29)
CREAT SERPL-MCNC: 1.9 MG/DL (ref 0.5–1.4)
DIFFERENTIAL METHOD: ABNORMAL
EOSINOPHIL # BLD AUTO: 0.1 K/UL (ref 0–0.5)
EOSINOPHIL NFR BLD: 2 % (ref 0–8)
ERYTHROCYTE [DISTWIDTH] IN BLOOD BY AUTOMATED COUNT: 13.8 % (ref 11.5–14.5)
EST. GFR  (AFRICAN AMERICAN): 42 ML/MIN/1.73 M^2
EST. GFR  (NON AFRICAN AMERICAN): 36 ML/MIN/1.73 M^2
GLUCOSE SERPL-MCNC: 209 MG/DL (ref 70–110)
HCT VFR BLD AUTO: 31.7 % (ref 40–54)
HGB BLD-MCNC: 10 G/DL (ref 14–18)
IMM GRANULOCYTES # BLD AUTO: 0.04 K/UL (ref 0–0.04)
IMM GRANULOCYTES NFR BLD AUTO: 0.6 % (ref 0–0.5)
INR PPP: 1 (ref 0.8–1.2)
LYMPHOCYTES # BLD AUTO: 1.4 K/UL (ref 1–4.8)
LYMPHOCYTES NFR BLD: 20.8 % (ref 18–48)
MCH RBC QN AUTO: 28.4 PG (ref 27–31)
MCHC RBC AUTO-ENTMCNC: 31.5 G/DL (ref 32–36)
MCV RBC AUTO: 90 FL (ref 82–98)
MONOCYTES # BLD AUTO: 0.7 K/UL (ref 0.3–1)
MONOCYTES NFR BLD: 9.8 % (ref 4–15)
NEUTROPHILS # BLD AUTO: 4.4 K/UL (ref 1.8–7.7)
NEUTROPHILS NFR BLD: 66.5 % (ref 38–73)
NRBC BLD-RTO: 0 /100 WBC
PLATELET # BLD AUTO: 202 K/UL (ref 150–350)
PMV BLD AUTO: 11.2 FL (ref 9.2–12.9)
POTASSIUM SERPL-SCNC: 4.4 MMOL/L (ref 3.5–5.1)
PROT SERPL-MCNC: 6.1 G/DL (ref 6–8.4)
PROTHROMBIN TIME: 10.8 SEC (ref 9–12.5)
RBC # BLD AUTO: 3.52 M/UL (ref 4.6–6.2)
SODIUM SERPL-SCNC: 138 MMOL/L (ref 136–145)
WBC # BLD AUTO: 6.64 K/UL (ref 3.9–12.7)

## 2020-03-17 PROCEDURE — 63600175 PHARM REV CODE 636 W HCPCS: Performed by: EMERGENCY MEDICINE

## 2020-03-17 PROCEDURE — 96374 THER/PROPH/DIAG INJ IV PUSH: CPT

## 2020-03-17 PROCEDURE — 85610 PROTHROMBIN TIME: CPT

## 2020-03-17 PROCEDURE — 99284 EMERGENCY DEPT VISIT MOD MDM: CPT | Mod: 25

## 2020-03-17 PROCEDURE — 80053 COMPREHEN METABOLIC PANEL: CPT

## 2020-03-17 PROCEDURE — 85730 THROMBOPLASTIN TIME PARTIAL: CPT

## 2020-03-17 PROCEDURE — 25000003 PHARM REV CODE 250: Performed by: EMERGENCY MEDICINE

## 2020-03-17 PROCEDURE — 85025 COMPLETE CBC W/AUTO DIFF WBC: CPT

## 2020-03-17 RX ORDER — MORPHINE SULFATE 4 MG/ML
4 INJECTION, SOLUTION INTRAMUSCULAR; INTRAVENOUS
Status: COMPLETED | OUTPATIENT
Start: 2020-03-17 | End: 2020-03-17

## 2020-03-17 RX ORDER — LIDOCAINE HYDROCHLORIDE AND EPINEPHRINE 10; 10 MG/ML; UG/ML
10 INJECTION, SOLUTION INFILTRATION; PERINEURAL ONCE
Status: DISCONTINUED | OUTPATIENT
Start: 2020-03-17 | End: 2020-03-17 | Stop reason: HOSPADM

## 2020-03-17 RX ORDER — LIDOCAINE HYDROCHLORIDE 10 MG/ML
10 INJECTION INFILTRATION; PERINEURAL
Status: COMPLETED | OUTPATIENT
Start: 2020-03-17 | End: 2020-03-17

## 2020-03-17 RX ORDER — SULFAMETHOXAZOLE AND TRIMETHOPRIM 800; 160 MG/1; MG/1
1 TABLET ORAL 2 TIMES DAILY
Qty: 14 TABLET | Refills: 0 | Status: SHIPPED | OUTPATIENT
Start: 2020-03-17 | End: 2020-03-17 | Stop reason: SDUPTHER

## 2020-03-17 RX ORDER — SULFAMETHOXAZOLE AND TRIMETHOPRIM 800; 160 MG/1; MG/1
1 TABLET ORAL 2 TIMES DAILY
Qty: 14 TABLET | Refills: 0 | Status: SHIPPED | OUTPATIENT
Start: 2020-03-17 | End: 2020-03-24

## 2020-03-17 RX ADMIN — LIDOCAINE HYDROCHLORIDE 10 ML: 10 INJECTION, SOLUTION INFILTRATION; PERINEURAL at 07:03

## 2020-03-17 RX ADMIN — MORPHINE SULFATE 4 MG: 4 INJECTION INTRAVENOUS at 05:03

## 2020-03-17 NOTE — ED NOTES
Pt presents to the ED with c/o abdominal pain. Pt has a bile duct drain in place and states it has not drained for the last few days. [facility] home health came visit him yesterday and was able to flush the tube. Pt denies fever, denies drainage around insertion site, denies n/v/d. Small amount of redness noted around drain insertion ion site.

## 2020-03-17 NOTE — ED PROVIDER NOTES
Encounter Date: 3/17/2020       History     Chief Complaint   Patient presents with    Abdominal Pain     States has a broken valve in bile duct drain. States pain is unbearable. Patient states has an appt with urologist at 1030am today. C/o right sided abdominal pain.        Time seen by provider: 5:02 AM on 03/17/2020    The patient is a 64 y.o. male who presents to the ED with complaint of abdominal pain which onset gradually this morning. Symptoms are constant in severity. Patient denies any other sxs at this time. No prior Tx. Patient notes his valve is not working properly, and notes he had it changed yesterday. He notes it was flushed yesterday at noon by his home health care. Patient notes there has been no drainage from his valve for a couple of days. He notes he has an appointment with his nephrologist at 10:30 AM today.     has a past medical history of Alcohol abuse, Anasarca (1/28/2019),CKD (chronic kidney disease) stage 3, GFR 30-59 ml/min, Colon polyps, Coronary artery disease due to calcified coronary lesion (05/08/2015), Diabetic polyneuropathy associated with type 2 diabetes mellitus (9/2/2015), Diverticulosis (1/28/2019), Former smoker (8/26/2015),Morbid obesity with BMI of 50.0-59.9, Status post bariatric surgery (1/11/2016), and Type 2 diabetes mellitus, with long-term current use of insulin (5/8/2015).  has a past surgical history that includes Angioplasty; Cyst Removal; perianal surgery; Colonoscopy (N/A, 10/6/2015); Knee arthroscopy; Gastrectomy; Coronary artery bypass graft (2017); Coronary angiography (Right, 3/20/2019); Coronary bypass graft angiography (3/20/2019); Insertion of dialysis catheter (N/A, 5/17/2019); Angiography (N/A, 6/28/2019); Esophagogastroduodenoscopy (N/A, 7/8/2019); and Endoscopic ultrasound of upper gastrointestinal tract (N/A, 2/26/2020).          The history is provided by the patient.     Review of patient's allergies indicates:  No Known Allergies  Past Medical  History:   Diagnosis Date    Alcohol abuse     Anasarca 1/28/2019    Anemia     Arthropathy associated with neurological disorder 9/2/2015    Atherosclerosis     Charcot foot due to diabetes mellitus     Chronic combined systolic and diastolic heart failure 01/29/2019 1-28-19 Left VentricleModerate decreased ejection fraction at 30%. Normal left ventricular cavity size. Normal wall thickness observed. Grade I (mild) left ventricular diastolic dysfunction consistent with impaired relaxation. Normal left atrial pressure. Moderate, global hypokinesis(see wall scoring diagram). Right VentricleNormal cavity size, wall thickness and ejection fraction. Wall motion n    Chronic pancreatitis 1/28/2019    CKD (chronic kidney disease) stage 3, GFR 30-59 ml/min     Colon polyps     approx 5 yrs ago    Coronary artery disease due to calcified coronary lesion 05/08/2015    5 stents on ASA      Diabetic polyneuropathy associated with type 2 diabetes mellitus 9/2/2015    Diverticulosis 1/28/2019    DM type 2 with diabetic peripheral neuropathy 2/4/2019    Encounter for blood transfusion     Essential hypertension 1/28/2019    Former smoker 8/26/2015    Healed ulcer of left foot on examination 6/20/2017    Hematuria     Hydrocele     approx 1.5 yrs ago    Hyperphosphatemia     Hypoalbuminemia 2/4/2019    Hypocalcemia     Lymphedema of both lower extremities 1/29/2019    Mixed hyperlipidemia 5/8/2015    Morbid obesity with BMI of 50.0-59.9, adult 5/8/2015    Onychomycosis of multiple toenails with type 2 diabetes mellitus and peripheral neuropathy 6/20/2017    Perianal cyst     approx 2 yrs ago    Proteinuria     Pseudocyst of pancreas 1/28/2019 1-28-19 Liver has a cirrhotic morphology with no focal lesions.  Significant interval increase in ascites when compared to prior exam which may account for patient's abdominal distension.  Hypodense air-fluid collection along the body of the pancreas which  is slightly smaller when compared to prior CT.  Findings may relate to pancreatic necrosis with pancreatic pseudocysts with infected pseudocyst    Skin cancer     skin cancer    Sleep apnea 8/26/2015    Status post bariatric surgery 1/11/2016    Type 2 diabetes mellitus, with long-term current use of insulin 5/8/2015     Past Surgical History:   Procedure Laterality Date    ANGIOGRAPHY N/A 6/28/2019    Procedure: ANGIOGRAM-PV STENT;  Surgeon: [facility] Diagnostic Provider;  Location: [facility] OR;  Service: Radiology;  Laterality: N/A;    ANGIOPLASTY      total x5 stents    COLONOSCOPY N/A 10/6/2015    Procedure: COLONOSCOPY;  Surgeon: Shekhar Richards MD;  Location: [facility] ENDO (2ND FLR);  Service: Endoscopy;  Laterality: N/A;  BMI over 55/2nd floor case    5 day hold Plavix, Dr Kwadwo Arroyo    CORONARY ANGIOGRAPHY Right 3/20/2019    Procedure: ANGIOGRAM, CORONARY ARTERY;  Surgeon: Bob Duque MD;  Location: [facility] CATH LAB;  Service: Cardiology;  Laterality: Right;    CORONARY ARTERY BYPASS GRAFT  2017    x3    CORONARY BYPASS GRAFT ANGIOGRAPHY  3/20/2019    Procedure: Bypass graft study;  Surgeon: Bob Duque MD;  Location: [facility] CATH LAB;  Service: Cardiology;;    CYST REMOVAL      ENDOSCOPIC ULTRASOUND OF UPPER GASTROINTESTINAL TRACT N/A 2/26/2020    Procedure: ULTRASOUND, UPPER GI TRACT, ENDOSCOPIC;  Surgeon: Robbie Yang MD;  Location: [facility] ENDO;  Service: Endoscopy;  Laterality: N/A;    ESOPHAGOGASTRODUODENOSCOPY N/A 7/8/2019    Procedure: ESOPHAGOGASTRODUODENOSCOPY (EGD);  Surgeon: Huan Brumfield MD;  Location: [facility] ENDO;  Service: Endoscopy;  Laterality: N/A;    GASTRECTOMY      INSERTION OF DIALYSIS CATHETER N/A 5/17/2019    Procedure: pleurx;  Surgeon: [facility] Diagnostic Provider;  Location: [facility] OR 2ND FLR;  Service: General;  Laterality: N/A;  Room 188/[place]    KNEE ARTHROSCOPY      perianal surgery      perianal cyst removed     Family History   Problem Relation Age of Onset     Cancer Mother     Cancer Father     Heart disease Father     Obesity Sister     Parkinsonism Brother     No Known Problems Paternal Grandmother     Cancer Paternal Grandfather     Cancer Brother     Diabetes Maternal Grandmother     Stroke Maternal Grandfather     Cirrhosis Neg Hx      Social History     Tobacco Use    Smoking status: Former Smoker     Packs/day: 2.00     Years: 30.00     Pack years: 60.00     Types: Cigarettes     Last attempt to quit: 2/1/2005     Years since quitting: 15.1    Smokeless tobacco: Never Used   Substance Use Topics    Alcohol use: No     Comment: started ~2014, reports 1 shot daily, max 3 shots daily, vague about alcohol consumption. Last drink 9/2018    Drug use: No     Review of Systems   Constitutional: Negative for chills and fever.   HENT: Negative for sore throat.    Eyes: Negative for pain.   Respiratory: Negative for cough and shortness of breath.    Cardiovascular: Negative for chest pain.   Gastrointestinal: Positive for abdominal pain. Negative for nausea and rectal pain.   Genitourinary: Negative for difficulty urinating.   Musculoskeletal: Negative for back pain.   Skin: Negative for wound.   Neurological: Negative for headaches.       Physical Exam     Initial Vitals [03/17/20 0304]   BP Pulse Resp Temp SpO2   (!) 150/72 98 16 98.3 °F (36.8 °C) 99 %      MAP       --         Physical Exam    Nursing note and vitals reviewed.  Constitutional: He appears well-developed and well-nourished. He is not diaphoretic. No distress.   HENT:   Head: Normocephalic and atraumatic.   Eyes: Conjunctivae are normal. Pupils are equal, round, and reactive to light.   Neck: Normal range of motion. Neck supple.   Cardiovascular: Normal rate, regular rhythm and normal heart sounds.   Pulmonary/Chest: Breath sounds normal. No respiratory distress.   Abdominal: Soft. Bowel sounds are normal. He exhibits distension. There is no tenderness.   Large distended obese abdomen. RUQ  cholecystostomy tube, no draining. Single stitch ripped but no sign of cellulitis or purulent discharge.   Musculoskeletal: Normal range of motion. He exhibits edema. He exhibits no tenderness.    Legs +3 pitting edema bilaterally with chronic venostasis.   Neurological: He is alert and oriented to person, place, and time. He has normal reflexes.   Skin: Skin is warm and dry.   Psychiatric: He has a normal mood and affect. Thought content normal.         ED Course   Procedures  Labs Reviewed   CBC W/ AUTO DIFFERENTIAL - Abnormal; Notable for the following components:       Result Value    RBC 3.52 (*)     Hemoglobin 10.0 (*)     Hematocrit 31.7 (*)     Mean Corpuscular Hemoglobin Conc 31.5 (*)     Immature Granulocytes 0.6 (*)     All other components within normal limits   COMPREHENSIVE METABOLIC PANEL - Abnormal; Notable for the following components:    CO2 22 (*)     Glucose 209 (*)     Creatinine 1.9 (*)     Calcium 8.6 (*)     Albumin 2.8 (*)     Alkaline Phosphatase 151 (*)     Anion Gap 7 (*)     eGFR if  42 (*)     eGFR if non  36 (*)     All other components within normal limits   APTT   PROTIME-INR          Imaging Results    None          Medical Decision Making:   Initial Assessment:   65 yo male multiple medical problems including cholechystitis with cholecystostomy tube , presents to the ED for possible malfunction cholecystostomy tube . Reports not draining, causing abdominal pain. Mild RUQ pain noted over cholecystostomy tube  Incision site, with one stitch torn. No sign of abscess, purulent discharge, dishicence. Attempted to drain,/flush but isstill not draining.  Will consult surgery for advise and reases.   Differential Diagnosis:   Cholecystostomy tube malfunction, dislodgment, abscess, cellulitits.  Clinical Tests:   Lab Tests: Ordered and Reviewed            Scribe Attestation:   Scribe #1: I performed the above scribed service and the documentation accurately  describes the services I performed. I attest to the accuracy of the note.    Attending Attestation:           Physician Attestation for Scribe:  Physician Attestation Statement for Scribe #1: I, Dr. Cannon, reviewed documentation, as scribed by Diamante Nova in my presence, and it is both accurate and complete.                 ED Course as of Mar 18 0557   Tue Mar 17, 2020   0509 Discussed with patient surgical team, recommended IR consult.    [SE]   0519 64-year-old male, multiple including obesity, has a cholecystectomy drain presents the ER for evaluation right-sided abdominal pain.  Reports he has been having no functions with his    [SE]   0720 Dr Bonilla who is on call for [facility] general surgery.  He has seen and evaluated patient in ED.  States that drain is functioning properly and that patient does not need RUQ abd US.  States that patient is stable for discharge.      [LD]      ED Course User Index  [LD] Caty Ortiz MD  [SE] Drake Cannon MD                Clinical Impression:       ICD-10-CM ICD-9-CM   1. Sepsis due to Escherichia coli with acute renal failure, tubular necrosis, and septic shock A41.51 038.42    R65.21 995.92    N17.0 785.52     584.5   2. Cholecystostomy tube dysfunction, initial encounter T85.518A 996.59         Disposition:   Disposition: Discharged  Condition: Stable     ED Disposition Condition    Discharge Stable        ED Prescriptions     Medication Sig Dispense Start Date End Date Auth. Provider    sulfamethoxazole-trimethoprim 800-160mg (BACTRIM DS) 800-160 mg Tab  (Status: Discontinued) Take 1 tablet by mouth 2 (two) times daily. for 7 days 14 tablet 3/17/2020 3/17/2020 Caty Ortiz MD    sulfamethoxazole-trimethoprim 800-160mg (BACTRIM DS) 800-160 mg Tab Take 1 tablet by mouth 2 (two) times daily. for 7 days 14 tablet 3/17/2020 3/24/2020 Caty Ortiz MD        Follow-up Information     Follow up With Specialties Details Why Contact Info    Carlos CLINE  Carlos FRANKLIN MD Radiology Schedule an appointment as soon as possible for a visit in 2 days  [address] [facility] [address]  [phone]                                       Drake Cannon MD  03/18/20 0557

## 2020-03-17 NOTE — CONSULTS
Neuroendocrine Surgery  Consult Note    Patient Name: Jian Arrieta  YOB: 1955    Date: 03/17/2020                     PRESENTING HISTORY     Chief Complaint/Reason for Admission: <principal problem not specified>    History of Present Illness:  63 y.o. male with PMH morbid obesity (BMI 41), stage 1 hepatic fibrosis (biopsy negative for cirrhosis), chronic pancreatitis and extrahepatic portal vein stenosis with  large volume ascites requiring twice weekly paracenteses in addition to pancreatic pseudocyst, non-compliance with low salt diet/fluid restriction, DM insulin dependent (HgA1c 7.1 on 5/23/19), BLE lymphedema, CHF, pAF on Eliquis and Metoprolol, CAD s/p PCI x5 on Plavix with subsequent CABG x3v (~2017 at ; LIMA-LAD [patent], SVG-OM [patent], SVG-RCA[occluded; L->R collaterals from LAD to PDA]), HTN, HLD, BERHANE on CPAP, R knee OA, Charcot foot, normocytic anemia, who presented to [facility] 6/27/19 as a transfer from [facility] with large volume ascites 2/2 portal vein occlusion requiring IR PV dilation.  Patient experienced acute renal failure after dye injection superimposed on diabetic nephropathy.     During the last admission, he was diagnosed with cholecystitis that was treated with cholecystostomy tube three weeks ago. Seen by Dr Rowe in clinic last Friday and doing well. He presents today with three day history of diminished drainage around the tube. No fevers, chills, nausea or vomiting. Tolerating diet without issues. No changes in bowel function.     Review of Systems:  12 point ROS negative except as stated in HPI    PAST HISTORY:     Past Medical History:   Diagnosis Date    Alcohol abuse     Anasarca 1/28/2019    Anemia     Arthropathy associated with neurological disorder 9/2/2015    Atherosclerosis     Charcot foot due to diabetes mellitus     Chronic combined systolic and diastolic heart failure 01/29/2019 1-28-19 Left VentricleModerate decreased  ejection fraction at 30%. Normal left ventricular cavity size. Normal wall thickness observed. Grade I (mild) left ventricular diastolic dysfunction consistent with impaired relaxation. Normal left atrial pressure. Moderate, global hypokinesis(see wall scoring diagram). Right VentricleNormal cavity size, wall thickness and ejection fraction. Wall motion n    Chronic pancreatitis 1/28/2019    CKD (chronic kidney disease) stage 3, GFR 30-59 ml/min     Colon polyps     approx 5 yrs ago    Coronary artery disease due to calcified coronary lesion 05/08/2015    5 stents on ASA      Diabetic polyneuropathy associated with type 2 diabetes mellitus 9/2/2015    Diverticulosis 1/28/2019    DM type 2 with diabetic peripheral neuropathy 2/4/2019    Encounter for blood transfusion     Essential hypertension 1/28/2019    Former smoker 8/26/2015    Healed ulcer of left foot on examination 6/20/2017    Hematuria     Hydrocele     approx 1.5 yrs ago    Hyperphosphatemia     Hypoalbuminemia 2/4/2019    Hypocalcemia     Lymphedema of both lower extremities 1/29/2019    Mixed hyperlipidemia 5/8/2015    Morbid obesity with BMI of 50.0-59.9, adult 5/8/2015    Onychomycosis of multiple toenails with type 2 diabetes mellitus and peripheral neuropathy 6/20/2017    Perianal cyst     approx 2 yrs ago    Proteinuria     Pseudocyst of pancreas 1/28/2019 1-28-19 Liver has a cirrhotic morphology with no focal lesions.  Significant interval increase in ascites when compared to prior exam which may account for patient's abdominal distension.  Hypodense air-fluid collection along the body of the pancreas which is slightly smaller when compared to prior CT.  Findings may relate to pancreatic necrosis with pancreatic pseudocysts with infected pseudocyst    Skin cancer     skin cancer    Sleep apnea 8/26/2015    Status post bariatric surgery 1/11/2016    Type 2 diabetes mellitus, with long-term current use of insulin  5/8/2015       Past Surgical History:   Procedure Laterality Date    ANGIOGRAPHY N/A 6/28/2019    Procedure: ANGIOGRAM-PV STENT;  Surgeon: [facility] Diagnostic Provider;  Location: [facility] OR;  Service: Radiology;  Laterality: N/A;    ANGIOPLASTY      total x5 stents    COLONOSCOPY N/A 10/6/2015    Procedure: COLONOSCOPY;  Surgeon: Shekhar Richards MD;  Location: [facility] ENDO (2ND FLR);  Service: Endoscopy;  Laterality: N/A;  BMI over 55/2nd floor case    5 day hold Plavix, Dr Kwadwo Arroyo    CORONARY ANGIOGRAPHY Right 3/20/2019    Procedure: ANGIOGRAM, CORONARY ARTERY;  Surgeon: Bob Duque MD;  Location: [facility] CATH LAB;  Service: Cardiology;  Laterality: Right;    CORONARY ARTERY BYPASS GRAFT  2017    x3    CORONARY BYPASS GRAFT ANGIOGRAPHY  3/20/2019    Procedure: Bypass graft study;  Surgeon: Bob Duque MD;  Location: [facility] CATH LAB;  Service: Cardiology;;    CYST REMOVAL      ENDOSCOPIC ULTRASOUND OF UPPER GASTROINTESTINAL TRACT N/A 2/26/2020    Procedure: ULTRASOUND, UPPER GI TRACT, ENDOSCOPIC;  Surgeon: Robbie Yang MD;  Location: [facility] ENDO;  Service: Endoscopy;  Laterality: N/A;    ESOPHAGOGASTRODUODENOSCOPY N/A 7/8/2019    Procedure: ESOPHAGOGASTRODUODENOSCOPY (EGD);  Surgeon: Huan Brumfield MD;  Location: [facility] ENDO;  Service: Endoscopy;  Laterality: N/A;    GASTRECTOMY      INSERTION OF DIALYSIS CATHETER N/A 5/17/2019    Procedure: pleurx;  Surgeon: [facility] Diagnostic Provider;  Location: [facility] 2ND FLR;  Service: General;  Laterality: N/A;  Room 188/[facility]    KNEE ARTHROSCOPY      perianal surgery      perianal cyst removed       Family History   Problem Relation Age of Onset    Cancer Mother     Cancer Father     Heart disease Father     Obesity Sister     Parkinsonism Brother     No Known Problems Paternal Grandmother     Cancer Paternal Grandfather     Cancer Brother     Diabetes Maternal Grandmother     Stroke Maternal Grandfather     Cirrhosis Neg Hx        Social History      Socioeconomic History    Marital status:      Spouse name: Not on file    Number of children: Not on file    Years of education: Not on file    Highest education level: Not on file   Occupational History    Not on file   Social Needs    Financial resource strain: Not on file    Food insecurity:     Worry: Not on file     Inability: Not on file    Transportation needs:     Medical: Not on file     Non-medical: Not on file   Tobacco Use    Smoking status: Former Smoker     Packs/day: 2.00     Years: 30.00     Pack years: 60.00     Types: Cigarettes     Last attempt to quit: 2/1/2005     Years since quitting: 15.1    Smokeless tobacco: Never Used   Substance and Sexual Activity    Alcohol use: No     Comment: started ~2014, reports 1 shot daily, max 3 shots daily, vague about alcohol consumption. Last drink 9/2018    Drug use: No    Sexual activity: Not on file   Lifestyle    Physical activity:     Days per week: Not on file     Minutes per session: Not on file    Stress: Not on file   Relationships    Social connections:     Talks on phone: Not on file     Gets together: Not on file     Attends [employer] service: Not on file     Active member of club or organization: Not on file     Attends meetings of clubs or organizations: Not on file     Relationship status: Not on file   Other Topics Concern    Not on file   Social History Narrative    Not on file       MEDICATIONS & ALLERGIES:     No current facility-administered medications on file prior to encounter.      Current Outpatient Medications on File Prior to Encounter   Medication Sig    apixaban (ELIQUIS) 5 mg Tab Take 1 tablet (5 mg total) by mouth 2 (two) times daily.    blood sugar diagnostic Strp Test strips to use with meter covered by insurance to check blood sugars twice daily. Insulin dependent diabetic.    blood-glucose meter Misc Glucometer covered by insurance to check blood sugars at home.  Insulin dependent diabetic.     "cyanocobalamin, vitamin B-12, 500 mcg Subl Place 1 tablet under the tongue every Monday.     ferrous sulfate (FEOSOL ORAL) Take 325 mg by mouth once daily.     furosemide (LASIX) 20 MG tablet Take 1 tablet (20 mg total) by mouth 2 (two) times daily.    glucagon, human recombinant, (GLUCAGON EMERGENCY KIT, HUMAN,) 1 mg SolR Inject 1 mg into the muscle as needed (For severely low sugar).    insulin aspart U-100 (NOVOLOG) 100 unit/mL injection Inject 8 Units into the skin 3 (three) times daily before meals.     insulin detemir U-100 (LEVEMIR FLEXTOUCH) 100 unit/mL (3 mL) SubQ InPn pen Inject 20 Units into the skin every evening.    lancets Misc Lancets to use with meter covered by insurance to check blood sugars twice daily.  Insulin dependent diabetic.    metoprolol succinate (TOPROL-XL) 25 MG 24 hr tablet Take 1 tablet (25 mg total) by mouth once daily.    metroNIDAZOLE (FLAGYL) 500 MG tablet Take 500 mg by mouth 3 (three) times daily.    pen needle, diabetic (INSUPEN) 32 gauge x 1/4" Ndle 1 each by Misc.(Non-Drug; Combo Route) route 4 (four) times daily.       Review of patient's allergies indicates:  No Known Allergies    OBJECTIVE:     Vital Signs:  Temp:  [98.3 °F (36.8 °C)-98.5 °F (36.9 °C)] 98.5 °F (36.9 °C)  Pulse:  [91-98] 91  Resp:  [12-16] 12  SpO2:  [99 %] 99 %  BP: (150-151)/(72-85) 151/85  Body mass index is 38.84 kg/m².     Physical Exam:  General:  NAD AAO  HEENT:  Normocephalic, atraumatic  CVS:  RRR  Resp:  Respirations unlabored  GI:  Abdomen soft, ND, aTTP around drain, erythema surrounding the drain without fluctuance, drain able to flush and aspirate easily  :  Deferred  MSK:  RLE Lymphedema   Neuro:  CNII-XII grossly intact  Psych:  Alert and oriented to person, place, and time    Laboratory  Lab Results   Component Value Date    WBC 6.64 03/17/2020    HGB 10.0 (L) 03/17/2020    HCT 31.7 (L) 03/17/2020    MCV 90 03/17/2020     03/17/2020     Recent Labs   Lab 03/17/20  0539 "   *      K 4.4      CO2 22*   BUN 20   CREATININE 1.9*   CALCIUM 8.6*      T bili 0.4  AST 24  ALT 27    Lab Results   Component Value Date    INR 1.0 03/17/2020    INR 1.0 02/25/2020    INR 1.0 02/23/2020     Lab Results   Component Value Date    HGBA1C 12.4 (H) 02/23/2020     No results for input(s): POCTGLUCOSE in the last 72 hours.    Diagnostic Results:  Labs: Reviewed    ASSESSMENT & PLAN:   Mr. Jian Arrieta is a 64 y.o. male with multiple medical co morbidities as listed above with recent hx of acute cholecystitis s/p cholecystostomy tube here for tube check.  Cholecystostomy tube appears to function well, draining clear bile[  Normal WBC and LFTs, Alk phos mildly elevated  Mild erythema around the tube without obvious abscess    Plan:  Cholecystostomy tube re-secured at bedside  OK to discharge from Surgery standpoint with one week course of Bactrim  Follow up with PCP within 1-2 weeks  Follow up with IR in 3 weeks for cholangiogram to determine further management of the arya tube  Follow up with Dr Rowe in 6-8 weeks per clinic note  Call with any questions or concerns.     Irwin Bonilla MD

## 2020-03-18 ENCOUNTER — TELEPHONE (OUTPATIENT)
Dept: NEPHROLOGY | Facility: CLINIC | Age: 65
End: 2020-03-18

## 2020-03-19 ENCOUNTER — PATIENT MESSAGE (OUTPATIENT)
Dept: ENDOCRINOLOGY | Facility: CLINIC | Age: 65
End: 2020-03-19

## 2020-03-19 ENCOUNTER — PATIENT OUTREACH (OUTPATIENT)
Dept: ADMINISTRATIVE | Facility: OTHER | Age: 65
End: 2020-03-19

## 2020-03-20 ENCOUNTER — CLINICAL SUPPORT (OUTPATIENT)
Dept: DIABETES | Facility: CLINIC | Age: 65
End: 2020-03-20
Payer: MEDICARE

## 2020-03-20 DIAGNOSIS — N18.30 TYPE 2 DIABETES MELLITUS WITH STAGE 3 CHRONIC KIDNEY DISEASE, WITHOUT LONG-TERM CURRENT USE OF INSULIN: ICD-10-CM

## 2020-03-20 DIAGNOSIS — E11.22 TYPE 2 DIABETES MELLITUS WITH STAGE 3 CHRONIC KIDNEY DISEASE, WITHOUT LONG-TERM CURRENT USE OF INSULIN: ICD-10-CM

## 2020-03-20 PROCEDURE — 99999 PR PBB SHADOW E&M-EST. PATIENT-LVL I: CPT | Mod: PBBFAC,,, | Performed by: DIETITIAN, REGISTERED

## 2020-03-20 PROCEDURE — G0108 DIAB MANAGE TRN  PER INDIV: HCPCS | Mod: S$GLB,,, | Performed by: DIETITIAN, REGISTERED

## 2020-03-20 PROCEDURE — G0108 PR DIAB MANAGE TRN  PER INDIV: ICD-10-PCS | Mod: S$GLB,,, | Performed by: DIETITIAN, REGISTERED

## 2020-03-20 PROCEDURE — 99999 PR PBB SHADOW E&M-EST. PATIENT-LVL I: ICD-10-PCS | Mod: PBBFAC,,, | Performed by: DIETITIAN, REGISTERED

## 2020-03-23 ENCOUNTER — EXTERNAL HOME HEALTH (OUTPATIENT)
Dept: HOME HEALTH SERVICES | Facility: HOSPITAL | Age: 65
End: 2020-03-23
Payer: MEDICARE

## 2020-03-23 ENCOUNTER — PATIENT MESSAGE (OUTPATIENT)
Dept: NEUROLOGY | Facility: HOSPITAL | Age: 65
End: 2020-03-23

## 2020-03-23 ENCOUNTER — TELEPHONE (OUTPATIENT)
Dept: PRIMARY CARE CLINIC | Facility: CLINIC | Age: 65
End: 2020-03-23

## 2020-03-23 ENCOUNTER — TELEPHONE (OUTPATIENT)
Dept: NEUROLOGY | Facility: HOSPITAL | Age: 65
End: 2020-03-23

## 2020-03-23 NOTE — TELEPHONE ENCOUNTER
----- Message from Jade Saenz sent at 3/23/2020 11:44 AM CDT -----  Contact: Latricia Pt's Home Health Nurse [phone]  JPB--Latricia the pt's Home Health Nurse is requesting a callback In regards to the pt.    Please call and advise.

## 2020-03-23 NOTE — TELEPHONE ENCOUNTER
----- Message from Ana Franz sent at 3/23/2020  4:14 PM CDT -----  Contact: Kate Home Health Nurse [phone]  KEYLA - Kate is calling to speak with you about the patients drain. Please call

## 2020-03-23 NOTE — TELEPHONE ENCOUNTER
Spoke to patient regarding setting up virtual video visit rather than in person clinic visit.  Directed patient and home health nurse, that was visiting patient at time, on setting up virtual visit.  Virtual video visit setup successfully, no other issues or concerns addressed at this time. Patient is scheduled to be seen virtually 3/24/2020 @ 1030.

## 2020-03-24 ENCOUNTER — PATIENT MESSAGE (OUTPATIENT)
Dept: ENDOCRINOLOGY | Facility: CLINIC | Age: 65
End: 2020-03-24

## 2020-03-24 DIAGNOSIS — N18.30 TYPE 2 DIABETES MELLITUS WITH STAGE 3 CHRONIC KIDNEY DISEASE, WITHOUT LONG-TERM CURRENT USE OF INSULIN: Primary | ICD-10-CM

## 2020-03-24 DIAGNOSIS — E11.22 TYPE 2 DIABETES MELLITUS WITH STAGE 3 CHRONIC KIDNEY DISEASE, WITHOUT LONG-TERM CURRENT USE OF INSULIN: Primary | ICD-10-CM

## 2020-03-24 RX ORDER — METOPROLOL SUCCINATE 25 MG/1
25 TABLET, EXTENDED RELEASE ORAL DAILY
Qty: 30 TABLET | Refills: 11 | Status: CANCELLED
Start: 2020-03-24 | End: 2021-03-24

## 2020-03-24 NOTE — TELEPHONE ENCOUNTER
Spoke to pt.infomred him that Dr. Rowe is ok with no return from drains as this is expected. Advise pt to notify office if drains doesn't flush. Orders for 10 ml flush placed

## 2020-03-26 LAB — FUNGUS SPEC CULT: NORMAL

## 2020-03-27 ENCOUNTER — TELEPHONE (OUTPATIENT)
Dept: HOME HEALTH SERVICES | Facility: HOSPITAL | Age: 65
End: 2020-03-27

## 2020-03-27 RX ORDER — METOPROLOL SUCCINATE 25 MG/1
25 TABLET, EXTENDED RELEASE ORAL DAILY
Qty: 30 TABLET | Refills: 1
Start: 2020-03-27 | End: 2020-05-01

## 2020-04-09 ENCOUNTER — PATIENT MESSAGE (OUTPATIENT)
Dept: NEUROLOGY | Facility: HOSPITAL | Age: 65
End: 2020-04-09

## 2020-04-10 ENCOUNTER — PATIENT MESSAGE (OUTPATIENT)
Dept: CARDIOLOGY | Facility: CLINIC | Age: 65
End: 2020-04-10

## 2020-04-13 ENCOUNTER — DOCUMENT SCAN (OUTPATIENT)
Dept: HOME HEALTH SERVICES | Facility: HOSPITAL | Age: 65
End: 2020-04-13
Payer: MEDICARE

## 2020-04-15 ENCOUNTER — PATIENT OUTREACH (OUTPATIENT)
Dept: ADMINISTRATIVE | Facility: OTHER | Age: 65
End: 2020-04-15

## 2020-04-15 ENCOUNTER — OFFICE VISIT (OUTPATIENT)
Dept: ENDOCRINOLOGY | Facility: CLINIC | Age: 65
End: 2020-04-15
Payer: MEDICARE

## 2020-04-15 DIAGNOSIS — E11.22 TYPE 2 DIABETES MELLITUS WITH STAGE 3 CHRONIC KIDNEY DISEASE, WITH LONG-TERM CURRENT USE OF INSULIN: ICD-10-CM

## 2020-04-15 DIAGNOSIS — R63.4 WEIGHT LOSS: ICD-10-CM

## 2020-04-15 DIAGNOSIS — N18.30 TYPE 2 DIABETES MELLITUS WITH STAGE 3 CHRONIC KIDNEY DISEASE, WITH LONG-TERM CURRENT USE OF INSULIN: ICD-10-CM

## 2020-04-15 DIAGNOSIS — Z79.4 TYPE 2 DIABETES MELLITUS WITH STAGE 3 CHRONIC KIDNEY DISEASE, WITH LONG-TERM CURRENT USE OF INSULIN: ICD-10-CM

## 2020-04-15 PROBLEM — E11.40 TYPE 2 DIABETES MELLITUS WITH DIABETIC NEUROPATHY, WITH LONG-TERM CURRENT USE OF INSULIN: Chronic | Status: RESOLVED | Noted: 2019-02-04 | Resolved: 2020-04-15

## 2020-04-15 PROCEDURE — 1101F PR PT FALLS ASSESS DOC 0-1 FALLS W/OUT INJ PAST YR: ICD-10-PCS | Mod: CPTII,95,, | Performed by: INTERNAL MEDICINE

## 2020-04-15 PROCEDURE — 1101F PT FALLS ASSESS-DOCD LE1/YR: CPT | Mod: CPTII,95,, | Performed by: INTERNAL MEDICINE

## 2020-04-15 PROCEDURE — 3046F PR MOST RECENT HEMOGLOBIN A1C LEVEL > 9.0%: ICD-10-PCS | Mod: CPTII,95,, | Performed by: INTERNAL MEDICINE

## 2020-04-15 PROCEDURE — 99213 OFFICE O/P EST LOW 20 MIN: CPT | Mod: 95,,, | Performed by: INTERNAL MEDICINE

## 2020-04-15 PROCEDURE — 99213 PR OFFICE/OUTPT VISIT, EST, LEVL III, 20-29 MIN: ICD-10-PCS | Mod: 95,,, | Performed by: INTERNAL MEDICINE

## 2020-04-15 PROCEDURE — 3046F HEMOGLOBIN A1C LEVEL >9.0%: CPT | Mod: CPTII,95,, | Performed by: INTERNAL MEDICINE

## 2020-04-15 NOTE — PATIENT INSTRUCTIONS
Send log in one month   Follow up visit in three months   OK to repeat A1C this Monday or before visit your next visit in three months.

## 2020-04-15 NOTE — PROGRESS NOTES
Subjective:      Patient ID: Jian Arrieta is a 64 y.o. male.    Chief Complaint:  Diabetes      History of Present Illness  Pt returns for follow up of type 2 diabetes, BMI 39, CKD stage III, s/p sleeve gastrectomy that is uncontrolled and complicated. Diagnosed with T2DM on 2016. Episode of pancreatitis one and half years ago, portal vein thrombosis and underwent angioplasty.     Previously seen by Dr. Carney for insulin adjustment.    Today patient reports no acute complaints, denies polyuria, excessive thirst, blurred vision.  Current reported weight: 213 lbs (continues to lose weight), last visit with Dr. Carney was 254 lbs.   Appetite is good. Does not eat as much as before.   Active in the yard, garage.     Current diabetic medications:  Novolog 8 units three times per day before meals  -> 6/0/0  Levemir to 25 units in the morning. --> takes it every morning  Patient administers his own insulin.     Blood sugar logs:  Takes it once daily   Fastin, 178, 169, 415, 345, 296, 161, 512   82,   3/31 102  Lunch: 359 (today only)    Patient feels unwell with blood sugars less than 150. He loses ability to function with low blood sugars. His lowest blood sugar has been in the 40s. (5 - 6 months ago). Seen at [facility].     Diabetes complications: CKD stage III, s/p sleeve gastrectomy    Review of Systems   Constitutional: Negative for chills and fever.   HENT: Negative for congestion and sinus pressure.    Eyes: Negative for visual disturbance.   Respiratory: Negative for chest tightness and shortness of breath.    Cardiovascular: Negative for chest pain and palpitations.   Gastrointestinal: Negative for abdominal distention, diarrhea, nausea and vomiting.   Genitourinary: Negative for dysuria and flank pain.   Musculoskeletal: Negative for back pain.   Skin: Negative for rash.   Neurological: Negative for weakness.   Hematological: Does not bruise/bleed easily.   Psychiatric/Behavioral: Negative  for sleep disturbance.       Objective:   Physical Exam  There were no vitals filed for this visit.    BP Readings from Last 3 Encounters:   03/17/20 120/65   03/17/20 139/69   03/13/20 117/67     Wt Readings from Last 1 Encounters:   03/17/20 0941 112.5 kg (248 lb)         There is no height or weight on file to calculate BMI.    Lab Review:   Lab Results   Component Value Date    HGBA1C 12.4 (H) 02/23/2020     Lab Results   Component Value Date    CHOL 143 02/21/2020    HDL 49 02/21/2020    LDLCALC 76.4 02/21/2020    TRIG 88 02/21/2020    CHOLHDL 34.3 02/21/2020     Lab Results   Component Value Date     03/17/2020    K 4.4 03/17/2020     03/17/2020    CO2 22 (L) 03/17/2020     (H) 03/17/2020    BUN 20 03/17/2020    CREATININE 1.9 (H) 03/17/2020    CALCIUM 8.6 (L) 03/17/2020    PROT 6.1 03/17/2020    ALBUMIN 2.8 (L) 03/17/2020    BILITOT 0.4 03/17/2020    ALKPHOS 151 (H) 03/17/2020    AST 24 03/17/2020    ALT 27 03/17/2020    ANIONGAP 7 (L) 03/17/2020    ESTGFRAFRICA 42 (A) 03/17/2020    EGFRNONAA 36 (A) 03/17/2020    TSH 2.211 01/28/2019     Results for BEATRIZ FLORIAN JASMINE (MRN [id number]) as of 4/15/2020 10:58   Ref. Range 3/17/2020 05:39   Hemoglobin Latest Ref Range: 14.0 - 18.0 g/dL 10.0 (L)   Hematocrit Latest Ref Range: 40.0 - 54.0 % 31.7 (L)   Results for BEATRIZ FLORIAN JASMINE (MRN [id number]) as of 4/15/2020 10:58   Ref. Range 2/27/2020 03:36   MICROALB/CREAT RATIO Latest Ref Range: 0.0 - 30.0 ug/mg 156.3 (H)       Assessment and Plan     Type 2 diabetes mellitus with stage 3 chronic kidney disease, with long-term current use of insulin  Complicated by neuropathy, non compliance and fear of low blood sugars  Diagnosed in 2016  On MDI with recent changes four weeks ago.     Have advised to check blood sugars before meals and bedtime.   Not certain he wants to do this.     Advised to take novolog 10 minutes before meals, if he is eating a smaller meal OK to take less (4 units for half the  amount)      Weight loss  May be related to hyperglycemia  Have advised to check blood sugars and use insulin.    F/u in three months with labs and provider.     The patient location is: home   The chief complaint leading to consultation is: T2DM  Visit type:2 audiovisual  Total time spent with patient: 30  Each patient to whom he or she provides medical services by telemedicine is:  (1) informed of the relationship between the physician and patient and the respective role of any other health care provider with respect to management of the patient; and (2) notified that he or she may decline to receive medical services by telemedicine and may withdraw from such care at any time.

## 2020-04-15 NOTE — ASSESSMENT & PLAN NOTE
Complicated by neuropathy, non compliance and fear of low blood sugars  Diagnosed in 2016  On MDI with recent changes four weeks ago.     Have advised to check blood sugars before meals and bedtime.   Not certain he wants to do this.     Advised to take novolog 10 minutes before meals, if he is eating a smaller meal OK to take less (4 units for half the amount)

## 2020-04-16 ENCOUNTER — DOCUMENT SCAN (OUTPATIENT)
Dept: HOME HEALTH SERVICES | Facility: HOSPITAL | Age: 65
End: 2020-04-16
Payer: MEDICARE

## 2020-04-20 ENCOUNTER — HOSPITAL ENCOUNTER (OUTPATIENT)
Facility: HOSPITAL | Age: 65
Discharge: HOME | End: 2020-04-20
Attending: SURGERY | Admitting: SURGERY
Payer: MEDICARE

## 2020-04-20 VITALS
SYSTOLIC BLOOD PRESSURE: 127 MMHG | HEART RATE: 74 BPM | HEIGHT: 67 IN | OXYGEN SATURATION: 100 % | BODY MASS INDEX: 34.06 KG/M2 | DIASTOLIC BLOOD PRESSURE: 61 MMHG | WEIGHT: 217 LBS | RESPIRATION RATE: 18 BRPM | TEMPERATURE: 98 F

## 2020-04-20 DIAGNOSIS — T85.518A CHOLECYSTOSTOMY TUBE DYSFUNCTION, INITIAL ENCOUNTER: ICD-10-CM

## 2020-04-20 LAB — SARS-COV-2 RDRP RESP QL NAA+PROBE: NEGATIVE

## 2020-04-20 PROCEDURE — 25500020 PHARM REV CODE 255: Performed by: SURGERY

## 2020-04-20 PROCEDURE — U0002 COVID-19 LAB TEST NON-CDC: HCPCS

## 2020-04-20 RX ADMIN — IOHEXOL 15 ML: 350 INJECTION, SOLUTION INTRAVENOUS at 12:04

## 2020-04-20 NOTE — H&P
Interventional Radiology Pre-Procedure History & Physical      Chief Complaint/Reason for Referral: Cholecystitis s/p cholecystostomy    History of Present Illness:  Jian Arrieta is a 64 y.o. male who presents with a cholecystostomy in the setting of prior cholecystitis. Has been in place 2/27. Here today for cholecystogram and tube removal.    Past Medical History:   Diagnosis Date    Alcohol abuse     Anasarca 1/28/2019    Anemia     Arthropathy associated with neurological disorder 9/2/2015    Atherosclerosis     Charcot foot due to diabetes mellitus     Chronic combined systolic and diastolic heart failure 01/29/2019 1-28-19 Left VentricleModerate decreased ejection fraction at 30%. Normal left ventricular cavity size. Normal wall thickness observed. Grade I (mild) left ventricular diastolic dysfunction consistent with impaired relaxation. Normal left atrial pressure. Moderate, global hypokinesis(see wall scoring diagram). Right VentricleNormal cavity size, wall thickness and ejection fraction. Wall motion n    Chronic pancreatitis 1/28/2019    CKD (chronic kidney disease) stage 3, GFR 30-59 ml/min     Colon polyps     approx 5 yrs ago    Coronary artery disease due to calcified coronary lesion 05/08/2015    5 stents on ASA      Diabetic polyneuropathy associated with type 2 diabetes mellitus 9/2/2015    Diverticulosis 1/28/2019    DM type 2 with diabetic peripheral neuropathy 2/4/2019    Encounter for blood transfusion     Essential hypertension 1/28/2019    Former smoker 8/26/2015    Healed ulcer of left foot on examination 6/20/2017    Hematuria     Hydrocele     approx 1.5 yrs ago    Hyperphosphatemia     Hypoalbuminemia 2/4/2019    Hypocalcemia     Lymphedema of both lower extremities 1/29/2019    Mixed hyperlipidemia 5/8/2015    Morbid obesity with BMI of 50.0-59.9, adult 5/8/2015    Onychomycosis of multiple toenails with type 2 diabetes mellitus and peripheral neuropathy  6/20/2017    Perianal cyst     approx 2 yrs ago    Proteinuria     Pseudocyst of pancreas 1/28/2019 1-28-19 Liver has a cirrhotic morphology with no focal lesions.  Significant interval increase in ascites when compared to prior exam which may account for patient's abdominal distension.  Hypodense air-fluid collection along the body of the pancreas which is slightly smaller when compared to prior CT.  Findings may relate to pancreatic necrosis with pancreatic pseudocysts with infected pseudocyst    Skin cancer     skin cancer    Sleep apnea 8/26/2015    Status post bariatric surgery 1/11/2016    Type 2 diabetes mellitus, with long-term current use of insulin 5/8/2015     Past Surgical History:   Procedure Laterality Date    ANGIOGRAPHY N/A 6/28/2019    Procedure: ANGIOGRAM-PV STENT;  Surgeon: Ewa Diagnostic Provider;  Location: [facility] OR;  Service: Radiology;  Laterality: N/A;    ANGIOPLASTY      total x5 stents    COLONOSCOPY N/A 10/6/2015    Procedure: COLONOSCOPY;  Surgeon: Shekhar Richards MD;  Location: [facility] ([address] [place] [address]);  Service: Endoscopy;  Laterality: N/A;  BMI over 55/2nd floor case    5 day hold Plavix, Dr Kwadwo Arroyo    CORONARY ANGIOGRAPHY Right 3/20/2019    Procedure: ANGIOGRAM, CORONARY ARTERY;  Surgeon: Bob Duque MD;  Location: [facility] CATH LAB;  Service: Cardiology;  Laterality: Right;    CORONARY ARTERY BYPASS GRAFT  2017    x3    CORONARY BYPASS GRAFT ANGIOGRAPHY  3/20/2019    Procedure: Bypass graft study;  Surgeon: Bob Duque MD;  Location: [facility] CATH LAB;  Service: Cardiology;;    CYST REMOVAL      ENDOSCOPIC ULTRASOUND OF UPPER GASTROINTESTINAL TRACT N/A 2/26/2020    Procedure: ULTRASOUND, UPPER GI TRACT, ENDOSCOPIC;  Surgeon: Robbie Yang MD;  Location: [facility] ENDO;  Service: Endoscopy;  Laterality: N/A;    ESOPHAGOGASTRODUODENOSCOPY N/A 7/8/2019    Procedure: ESOPHAGOGASTRODUODENOSCOPY (EGD);  Surgeon: Huan Brumfield MD;  Location: [facility] ENDO;  Service:  Endoscopy;  Laterality: N/A;    GASTRECTOMY      INSERTION OF DIALYSIS CATHETER N/A 5/17/2019    Procedure: pleurx;  Surgeon: Ewa Diagnostic Provider;  Location: [facility] OR [address] [place] [address];  Service: General;  Laterality: N/A;  Room 188/[place]    KNEE ARTHROSCOPY      perianal surgery      perianal cyst removed       Allergies:   Review of patient's allergies indicates:  No Known Allergies    Home Meds:   Prior to Admission medications    Medication Sig Start Date End Date Taking? Authorizing Provider   apixaban (ELIQUIS) 5 mg Tab Take 1 tablet (5 mg total) by mouth 2 (two) times daily. 8/26/19  Yes Jaime Carney III, MD   furosemide (LASIX) 20 MG tablet Take 1 tablet (20 mg total) by mouth 2 (two) times daily. 7/15/19 7/14/20 Yes Liliane Churchill MD   insulin aspart U-100 (NOVOLOG) 100 unit/mL injection Inject 6 Units into the skin 3 (three) times daily before meals.    Yes Historical Provider, MD   blood sugar diagnostic Strp Test strips to use with meter covered by insurance to check blood sugars twice daily. Insulin dependent diabetic. 10/16/19   Leon Barajas, DO   blood-glucose meter Misc Glucometer covered by insurance to check blood sugars at home.  Insulin dependent diabetic. 10/16/19   Leon Barajas DO   cyanocobalamin, vitamin B-12, 500 mcg Subl Place 1 tablet under the tongue every Monday.     Historical Provider, MD   ferrous sulfate (FEOSOL ORAL) Take 325 mg by mouth once daily.     Historical Provider, MD   glucagon, human recombinant, (GLUCAGON EMERGENCY KIT, HUMAN,) 1 mg SolR Inject 1 mg into the muscle as needed (For severely low sugar). 12/6/19 12/5/20  Huan Carney MD   insulin detemir U-100 (LEVEMIR FLEXTOUCH) 100 unit/mL (3 mL) SubQ InPn pen Inject 20 Units into the skin every evening.  Patient taking differently: Inject 25 Units into the skin every evening.  11/15/19   Leon Barajas DO   lancets Misc Lancets to use with meter covered by insurance to check blood  "sugars twice daily.  Insulin dependent diabetic. 10/16/19   Leon Barajas DO   metoprolol succinate (TOPROL-XL) 25 MG 24 hr tablet Take 1 tablet (25 mg total) by mouth once daily. 3/27/20 3/27/21  Jaime Carney III, MD   metroNIDAZOLE (FLAGYL) 500 MG tablet Take 500 mg by mouth 3 (three) times daily.    Historical Provider, MD   pen needle, diabetic (INSUPEN) 32 gauge x 1/4" Ndle 1 each by Misc.(Non-Drug; Combo Route) route 4 (four) times daily. 11/15/19   Leon Barajas DO   sodium chloride 0.9% (NORMAL SALINE FLUSH) injection use as directed to flush drains daily 3/24/20      sulfamethoxazole/trimethoprim (BACTRIM ORAL) Take by mouth.    Historical Provider, MD       Anticoagulation/Antiplatelet Meds: Eliquis     Review of Systems:   Hematological: no known coagulopathies  Respiratory: no shortness of breath  Cardiovascular: no chest pain  Gastrointestinal: no abdominal pain  Genitourinary: no dysuria  Musculoskeletal: negative  Neurological: no TIA or stroke symptoms     Physical Exam:  Temp: 97.6 °F (36.4 °C) (04/20/20 1051)  Pulse: 74 (04/20/20 1054)  Resp: 18 (04/20/20 1054)  BP: 127/61 (04/20/20 1054)  SpO2: 100 % (04/20/20 1054)    General: WNWD, NAD  HEENT: Normocephalic, sclera anicteric, oropharynx clear  Heart: RRR  Lungs: Symmetric excursions, breathing unlabored  Abd: NTND, soft, RUQ cholecystomy tube in place  Extremities: BLE edema  Neuro: Gross nonfocal    Laboratory:  Lab Results   Component Value Date    INR 1.0 03/17/2020       Lab Results   Component Value Date    WBC 6.64 03/17/2020    HGB 10.0 (L) 03/17/2020    HCT 31.7 (L) 03/17/2020    MCV 90 03/17/2020     03/17/2020      Lab Results   Component Value Date     (H) 03/17/2020     03/17/2020    K 4.4 03/17/2020     03/17/2020    CO2 22 (L) 03/17/2020    BUN 20 03/17/2020    CREATININE 1.9 (H) 03/17/2020    CALCIUM 8.6 (L) 03/17/2020    MG 2.3 02/29/2020    ALT 27 03/17/2020    AST 24 03/17/2020    " ALBUMIN 2.8 (L) 03/17/2020    BILITOT 0.4 03/17/2020    BILIDIR 0.2 07/10/2015     Assessment/Plan:  64 y.o. male with an indwelling cholecystomy tube. Will undergo cholecystogram and possible tube removal today.    Sedation plan: None    Risks (including, but not limited to, pain, bleeding, infection, damage to nearby structures, failure, and the need for additional procedures), benefits, and alternatives were discussed with the patient. All questions were answered to the best of my abilities. The patient wishes to proceed. Written informed consent was obtained.      Carlos Gonzalez MD  [facility]  Pager [phone]

## 2020-04-20 NOTE — DISCHARGE SUMMARY
Interventional Radiology Discharge Summary      Hospital Course: No complications    Admit Date: 4/20/2020  Discharge Date: 04/20/2020     Instructions Given to Patient: Yes  Diet: Resume prior diet  Activity: Activity as tolerated    Description of Condition on Discharge: Stable  Vital Signs (Most Recent): Temp: 97.6 °F (36.4 °C) (04/20/20 1051)  Pulse: 74 (04/20/20 1054)  Resp: 18 (04/20/20 1054)  BP: 127/61 (04/20/20 1054)  SpO2: 100 % (04/20/20 1054)    Discharge Disposition: Home    Discharge Diagnosis: Cholecystitis s/p cholecystostomy, cystic duct now patent, tube removed     Follow-up: With Dr. Soledad Gonzalez MD  [facility]  Pager [phone]

## 2020-04-20 NOTE — NURSING
Reviewed discharge instructions with pt. Voiced good understanding. Pt stable, no distress. To car per staff via w/c to family

## 2020-04-20 NOTE — PROCEDURES
Interventional Radiology Post-Procedure Note    Pre Op Diagnosis: Cholecystitis   Post Op Diagnosis: Same    Procedure: Cholecystogram, tractogram    Procedure performed by: Carlos    Written Informed Consent Obtained: Yes   Specimen Sent: No  Estimated Blood Loss: Minimal    Findings:   Cystic duct patent. Tract well-formed- no leakage into peritoneum. Unusual GB contours/filling defect may represent stones, sludge or a Phrygian cap. Tube removed.    No immediate complications. Patient tolerated procedure well. Please see full dictated procedure report for additional details and recommendations.      Carlos Gonzalez MD  [facility]  Pager [phone]

## 2020-04-22 ENCOUNTER — DOCUMENT SCAN (OUTPATIENT)
Dept: HOME HEALTH SERVICES | Facility: HOSPITAL | Age: 65
End: 2020-04-22
Payer: MEDICARE

## 2020-04-27 ENCOUNTER — PATIENT OUTREACH (OUTPATIENT)
Dept: ADMINISTRATIVE | Facility: OTHER | Age: 65
End: 2020-04-27

## 2020-04-29 ENCOUNTER — TELEPHONE (OUTPATIENT)
Dept: NEUROLOGY | Facility: HOSPITAL | Age: 65
End: 2020-04-29

## 2020-04-29 NOTE — H&P (VIEW-ONLY)
"NOLANETS:  [facility]  A collaboration between [facility] and [facility]      PATIENT: Jian Arrieta  MRN: [id number]  DATE: 5/1/2020    Subjective:      Chief Complaint: Follow up post cholecystostomy       Vitals:   Vitals:    05/01/20 1020   BP: 126/67   BP Location: Right arm   Pulse: 73   Weight: 105.5 kg (232 lb 9.4 oz)   Height: 5' 7" (1.702 m)        Karnofsky Score:     Diagnosis:   1. Obesity (BMI 30-39.9)    2. Chronic kidney disease, stage 3    3. Hepatic fibrosis         Oncologic History: na    Interval History:  Follow-up from recent discharge after cholecystostomy tube placed for cholecystitis.  Tube  was recently removed after cholangiogram revealed patent cystic duct and common duct.  Now complains of postprandial right upper quadrant pain especially after fatty meals or spicy food.  One episode of emesis yesterday evening after eating [place].     63 y.o. male with PMH morbid obesity (BMI 41), stage 1 hepatic fibrosis (biopsy negative for cirrhosis), chronic pancreatitis and extrahepatic portal vein stenosis with  large volume ascites requiring twice weekly paracenteses in addition to pancreatic pseudocyst, non-compliance with low salt diet/fluid restriction, DM insulin dependent (HgA1c 7.1 on 5/23/19), BLE lymphedema, CHF, pAF on Eliquis and Metoprolol, CAD s/p PCI x5 on Plavix with subsequent CABG x3v (~2017 at ; LIMA-LAD [patent], SVG-OM [patent], SVG-RCA[occluded; L->R collaterals from LAD to PDA]), HTN, HLD, BERHANE on CPAP, R knee OA, Charcot foot, normocytic anemia, who presented to [facility] 6/27/19 as a transfer from [facility] with large volume ascites 2/2 portal vein occlusion requiring IR PV dilation.  Patient experienced acute renal failure after dye injection superimposed on diabetic nephropathy.  Renal insufficiency resolved ascites resolved and lower extremity edema improved " with fluid restriction and diuretics.  Patient was discharged to a rehab unit.   Looking for to beginning home physical therapy and being followed in wound care for chronic venous stasis ulcer.   He states his blood sugars have been fine his blood sugar has been good on minimal medications and his ascites has resolved.  He still admits to noncompliance with salt and water and lower extremity edema worsens when he is noncompliant.  3/13/20-Status post cholecystostomy with occluded tube  4/20/20- Cholecystitis s/p cholecystostomy, cystic duct now patent, tube removed        Past Medical History:  Past Medical History:   Diagnosis Date    Alcohol abuse     Anasarca 1/28/2019    Anemia     Arthropathy associated with neurological disorder 9/2/2015    Atherosclerosis     Charcot foot due to diabetes mellitus     Chronic combined systolic and diastolic heart failure 01/29/2019 1-28-19 Left VentricleModerate decreased ejection fraction at 30%. Normal left ventricular cavity size. Normal wall thickness observed. Grade I (mild) left ventricular diastolic dysfunction consistent with impaired relaxation. Normal left atrial pressure. Moderate, global hypokinesis(see wall scoring diagram). Right VentricleNormal cavity size, wall thickness and ejection fraction. Wall motion n    Chronic pancreatitis 1/28/2019    CKD (chronic kidney disease) stage 3, GFR 30-59 ml/min     Colon polyps     approx 5 yrs ago    Coronary artery disease due to calcified coronary lesion 05/08/2015    5 stents on ASA      Diabetic polyneuropathy associated with type 2 diabetes mellitus 9/2/2015    Diverticulosis 1/28/2019    DM type 2 with diabetic peripheral neuropathy 2/4/2019    Encounter for blood transfusion     Essential hypertension 1/28/2019    Former smoker 8/26/2015    Healed ulcer of left foot on examination 6/20/2017    Hematuria     Hydrocele     approx 1.5 yrs ago    Hyperphosphatemia     Hypoalbuminemia 2/4/2019     Hypocalcemia     Lymphedema of both lower extremities 1/29/2019    Mixed hyperlipidemia 5/8/2015    Morbid obesity with BMI of 50.0-59.9, adult 5/8/2015    Onychomycosis of multiple toenails with type 2 diabetes mellitus and peripheral neuropathy 6/20/2017    Perianal cyst     approx 2 yrs ago    Proteinuria     Pseudocyst of pancreas 1/28/2019 1-28-19 Liver has a cirrhotic morphology with no focal lesions.  Significant interval increase in ascites when compared to prior exam which may account for patient's abdominal distension.  Hypodense air-fluid collection along the body of the pancreas which is slightly smaller when compared to prior CT.  Findings may relate to pancreatic necrosis with pancreatic pseudocysts with infected pseudocyst    Skin cancer     skin cancer    Sleep apnea 8/26/2015    Status post bariatric surgery 1/11/2016    Type 2 diabetes mellitus, with long-term current use of insulin 5/8/2015       Past Surgical History:  Past Surgical History:   Procedure Laterality Date    ANGIOGRAPHY N/A 6/28/2019    Procedure: ANGIOGRAM-PV STENT;  Surgeon: Ewa Diagnostic Provider;  Location: [facility] OR;  Service: Radiology;  Laterality: N/A;    ANGIOPLASTY      total x5 stents    COLONOSCOPY N/A 10/6/2015    Procedure: COLONOSCOPY;  Surgeon: Shekhar Richards MD;  Location: [facility] ENDO ([address] [place] [address]);  Service: Endoscopy;  Laterality: N/A;  BMI over 55/2nd floor case    5 day hold Plavix, Dr Kwadwo Arroyo    CORONARY ANGIOGRAPHY Right 3/20/2019    Procedure: ANGIOGRAM, CORONARY ARTERY;  Surgeon: Bob Duque MD;  Location: [facility] CATH LAB;  Service: Cardiology;  Laterality: Right;    CORONARY ARTERY BYPASS GRAFT  2017    x3    CORONARY BYPASS GRAFT ANGIOGRAPHY  3/20/2019    Procedure: Bypass graft study;  Surgeon: Bob Duque MD;  Location: [facility] CATH LAB;  Service: Cardiology;;    CYST REMOVAL      ENDOSCOPIC ULTRASOUND OF UPPER GASTROINTESTINAL TRACT N/A 2/26/2020    Procedure: ULTRASOUND,  UPPER GI TRACT, ENDOSCOPIC;  Surgeon: Robbie Yang MD;  Location: [facility] ENDO;  Service: Endoscopy;  Laterality: N/A;    ESOPHAGOGASTRODUODENOSCOPY N/A 7/8/2019    Procedure: ESOPHAGOGASTRODUODENOSCOPY (EGD);  Surgeon: Huan Brumfield MD;  Location: [facility] ENDO;  Service: Endoscopy;  Laterality: N/A;    GASTRECTOMY      INSERTION OF DIALYSIS CATHETER N/A 5/17/2019    Procedure: pleurx;  Surgeon: Dosc Diagnostic Provider;  Location: [facility] OR [address] [place] [address];  Service: General;  Laterality: N/A;  Room 188/[place]    KNEE ARTHROSCOPY      perianal surgery      perianal cyst removed       Family History:  Family History   Problem Relation Age of Onset    Cancer Mother     Cancer Father     Heart disease Father     Obesity Sister     Parkinsonism Brother     No Known Problems Paternal Grandmother     Cancer Paternal Grandfather     Cancer Brother     Diabetes Maternal Grandmother     Stroke Maternal Grandfather     Cirrhosis Neg Hx        Allergies:  Review of patient's allergies indicates:  No Known Allergies    Medications:  Current Outpatient Medications   Medication Sig Dispense Refill    apixaban (ELIQUIS) 5 mg Tab Take 1 tablet (5 mg total) by mouth 2 (two) times daily. 60 tablet 11    blood sugar diagnostic Strp Test strips to use with meter covered by insurance to check blood sugars twice daily. Insulin dependent diabetic. 200 strip 11    blood-glucose meter Misc Glucometer covered by insurance to check blood sugars at home.  Insulin dependent diabetic. 1 each 0    cyanocobalamin, vitamin B-12, 500 mcg Subl Place 1 tablet under the tongue every Monday.       ferrous sulfate (FEOSOL ORAL) Take 325 mg by mouth once daily.       furosemide (LASIX) 20 MG tablet Take 1 tablet (20 mg total) by mouth 2 (two) times daily. 60 tablet 11    glucagon, human recombinant, (GLUCAGON EMERGENCY KIT, HUMAN,) 1 mg SolR Inject 1 mg into the muscle as needed (For severely low sugar). 1 each 2    insulin aspart U-100  "(NOVOLOG) 100 unit/mL injection Inject 6 Units into the skin 3 (three) times daily before meals.       insulin detemir U-100 (LEVEMIR FLEXTOUCH) 100 unit/mL (3 mL) SubQ InPn pen Inject 20 Units into the skin every evening. (Patient taking differently: Inject 25 Units into the skin every evening. ) 3 mL 12    lancets Misc Lancets to use with meter covered by insurance to check blood sugars twice daily.  Insulin dependent diabetic. 200 each 11    metoprolol succinate (TOPROL-XL) 25 MG 24 hr tablet Take 1 tablet (25 mg total) by mouth once daily. 30 tablet 1    metroNIDAZOLE (FLAGYL) 500 MG tablet Take 500 mg by mouth 3 (three) times daily.      pen needle, diabetic (INSUPEN) 32 gauge x 1/4" Ndle 1 each by Misc.(Non-Drug; Combo Route) route 4 (four) times daily. 360 each 3    sodium chloride 0.9% (NORMAL SALINE FLUSH) injection use as directed to flush drains daily 300 mL 4    sulfamethoxazole/trimethoprim (BACTRIM ORAL) Take by mouth.       No current facility-administered medications for this visit.        Review of Systems   Constitutional: Positive for activity change, appetite change and unexpected weight change. Negative for fatigue and fever.   HENT: Negative.    Eyes:        Wears glasses   Respiratory: Negative for cough, shortness of breath and wheezing.    Cardiovascular: Positive for palpitations and leg swelling. Negative for chest pain.   Gastrointestinal: Negative for abdominal distention, abdominal pain, constipation and diarrhea.   Genitourinary: Negative for difficulty urinating.   Musculoskeletal: Positive for arthralgias, back pain and gait problem.   Skin: Negative.    Allergic/Immunologic: Negative for immunocompromised state.   Neurological: Positive for syncope.   Hematological: Bruises/bleeds easily.   Psychiatric/Behavioral: Negative for dysphoric mood. The patient is not nervous/anxious.       Objective:      Physical Exam   Constitutional: He is oriented to person, place, and time. He " appears well-developed and well-nourished.   Eyes: Pupils are equal, round, and reactive to light. No scleral icterus.   Neck: No JVD present. No tracheal deviation present.   Cardiovascular: Normal rate and regular rhythm.   Pulmonary/Chest: Effort normal and breath sounds normal.   Abdominal: Soft. Bowel sounds are normal. He exhibits no distension. There is tenderness (Right upper quadrant). There is no guarding.        Musculoskeletal: Normal range of motion. He exhibits edema.   Neurological: He is alert and oriented to person, place, and time.   Skin: Skin is warm and dry.   Psychiatric: He has a normal mood and affect. His behavior is normal. Judgment and thought content normal.   Nursing note and vitals reviewed.     Assessment:       1. Obesity (BMI 30-39.9)    2. Chronic kidney disease, stage 3    3. Hepatic fibrosis        Laboratory Data:    Neuroendocrine Labs Latest Ref Rng & Units 5/1/2020 4/20/2020 4/20/2020 3/17/2020 3/17/2020 3/17/2020 3/13/2020   FOLATE 4.0 - 24.0 ng/mL - - - - - - -   VITAMIN B12 210 - 950 pg/mL - - - - - - -   TSH 0.400 - 4.000 uIU/mL - - - - - - -   WBC 3.90 - 12.70 K/uL - - - 6.64 - - -   HGB 14.0 - 18.0 g/dL - - - 10.0(L) - - -   HCT 40.0 - 54.0 % - - - 31.7(L) - - -   PLATLETS 150 - 350 K/uL - - - 202 - - -   PT 9.0 - 12.5 sec - - - 10.8 - - -   PTT 21.0 - 32.0 sec - - - 27.4 - - -   INR 0.8 - 1.2 - - - 1.0 - - -   GLUCOSE 70 - 110 mg/dL - - - 209(H) - - -   BUN 8 - 23 mg/dL - - - 20 - - -   CREATININE 0.5 - 1.4 mg/dL - - - 1.9(H) - - -    - 145 mmol/L - - - 138 - - -   K 3.5 - 5.1 mmol/L - - - 4.4 - - -   CHLORIDE 95 - 110 mmol/L - - - 109 - - -   CO2 23 - 29 mmol/L - - - 22(L) - - -   CALCIUM 8.7 - 10.5 mg/dL - - - 8.6(L) - - -   PROTEIN, TOTAL 6.0 - 8.4 g/dL - - - 6.1 - - -   PHOSPHORUS 2.7 - 4.5 mg/dL - - - - - - -   ALBUMIN 3.5 - 5.2 g/dL - - - 2.8(L) - - -   TOTAL BILIRUBIN 0.1 - 1.0 mg/dL - - - 0.4 - - -   DIRECT BILIRUBIN 0.1 - 0.3 mg/dL - - - - - - -   ALK  PHOSPHATASE 55 - 135 U/L - - - 151(H) - - -   GGT 8 - 55 U/L - - - - - - -   SGOT (AST) 10 - 40 U/L - - - 24 - - -   SGPT (ALT) 10 - 44 U/L - - - 27 - - -   SERUM AMYLASE 20 - 110 U/L - - - - - - -    - 260 U/L - - - - - - -   TRIGLYCERIDES 30 - 150 mg/dL - - - - - - -   CHOLERSTEROL 120 - 199 mg/dL - - - - - - -   HDL 40 - 75 mg/dL - - - - - - -   LDL 63.0 - 159.0 mg/dL - - - - - - -   MG 1.6 - 2.6 mg/dL - - - - - - -   URINE AMYLASE U/L Not established U/L - - - - - - -   24 HR URINE PROTEIN 0 - 15 mg/dL - - - - - - -   24 HR CREATININE CLEARANCE 23.0 - 375.0 mg/dL - - - - - - -   HEMOGLOBIN A1C 4.0 - 5.6 % - 9.4(H) - - - - -   Weight - 232 lbs 9 oz - 217 lbs - 248 lbs 248 lbs 248 lbs 9 oz   Sedimentation Rate, Manual 0 - 10 mm/Hr - - - - - - -         Impression:  Status post cholecystostomy tube for biliary sepsis and cholecystitis,  interval cholecystectomy       COVID-19 Discussion:     I had long discussion with patient and any applicable family about the COVID-19 coronavirus epidemic and the recommended precautions with regard to cancer and/or hematology patients. I have re-iterated the CDC recommendations for adequate hand washing, use of hand -like products, and coughing into elbow, etc. In addition, especially for our patients who are on chemotherapy and/or our otherwise immunocompromised patients, I have recommended avoidance of crowds, including movie theaters, restaurants, churches, etc. I have recommended avoidance of any sick or symptomatic family members and/or friends. I have also recommended avoidance of any raw and unwashed food products, and general avoidance of food items that have not been prepared by themselves. The patient has been asked to call us immediately with any symptom developments, issues, questions or other general concerns.   \          Plan:       Laparoscopic cholecystectomy  Risks/benefits explained and accepted.  All questions answered.  Under with last dose of  Eliquis was yesterday he will resume Eliquis postop            SON Rowe MD, FACS  Professor of Surgery, [facility]  Neuroendocrine Surgery, Hepatic/Pancreatic & General Surgery  [address], [address]  [place]  [address]  ph. [phone]; [phone]  fax. [phone]

## 2020-04-29 NOTE — PROGRESS NOTES
"[place]ANETS:  [place] [facility]  A collaboration between [facility] and [facility]      PATIENT: Jian Arrieta  MRN: [id number]  DATE: 5/1/2020    Subjective:      Chief Complaint: Follow up post cholecystostomy       Vitals:   Vitals:    05/01/20 1020   BP: 126/67   BP Location: Right arm   Pulse: 73   Weight: 105.5 kg (232 lb 9.4 oz)   Height: 5' 7" (1.702 m)        Karnofsky Score:     Diagnosis:   1. Obesity (BMI 30-39.9)    2. Chronic kidney disease, stage 3    3. Hepatic fibrosis         Oncologic History: na    Interval History:  Follow-up from recent discharge after cholecystostomy tube placed for cholecystitis.  Tube  was recently removed after cholangiogram revealed patent cystic duct and common duct.  Now complains of postprandial right upper quadrant pain especially after fatty meals or spicy food.  One episode of emesis yesterday evening after eating Croatian.     63 y.o. male with PMH morbid obesity (BMI 41), stage 1 hepatic fibrosis (biopsy negative for cirrhosis), chronic pancreatitis and extrahepatic portal vein stenosis with  large volume ascites requiring twice weekly paracenteses in addition to pancreatic pseudocyst, non-compliance with low salt diet/fluid restriction, DM insulin dependent (HgA1c 7.1 on 5/23/19), BLE lymphedema, CHF, pAF on Eliquis and Metoprolol, CAD s/p PCI x5 on Plavix with subsequent CABG x3v (~2017 at ; LIMA-LAD [patent], SVG-OM [patent], SVG-RCA[occluded; L->R collaterals from LAD to PDA]), HTN, HLD, BERHANE on CPAP, R knee OA, Charcot foot, normocytic anemia, who presented to [facility]/19 as a transfer from [facility] with large volume ascites 2/2 portal vein occlusion requiring IR PV dilation.  Patient experienced acute renal failure after dye injection superimposed on diabetic nephropathy.  Renal insufficiency resolved ascites resolved and lower extremity edema improved " with fluid restriction and diuretics.  Patient was discharged to a rehab unit.   Looking for to beginning home physical therapy and being followed in wound care for chronic venous stasis ulcer.   He states his blood sugars have been fine his blood sugar has been good on minimal medications and his ascites has resolved.  He still admits to noncompliance with salt and water and lower extremity edema worsens when he is noncompliant.  3/13/20-Status post cholecystostomy with occluded tube  4/20/20- Cholecystitis s/p cholecystostomy, cystic duct now patent, tube removed        Past Medical History:  Past Medical History:   Diagnosis Date    Alcohol abuse     Anasarca 1/28/2019    Anemia     Arthropathy associated with neurological disorder 9/2/2015    Atherosclerosis     Charcot foot due to diabetes mellitus     Chronic combined systolic and diastolic heart failure 01/29/2019 1-28-19 Left VentricleModerate decreased ejection fraction at 30%. Normal left ventricular cavity size. Normal wall thickness observed. Grade I (mild) left ventricular diastolic dysfunction consistent with impaired relaxation. Normal left atrial pressure. Moderate, global hypokinesis(see wall scoring diagram). Right VentricleNormal cavity size, wall thickness and ejection fraction. Wall motion n    Chronic pancreatitis 1/28/2019    CKD (chronic kidney disease) stage 3, GFR 30-59 ml/min     Colon polyps     approx 5 yrs ago    Coronary artery disease due to calcified coronary lesion 05/08/2015    5 stents on ASA      Diabetic polyneuropathy associated with type 2 diabetes mellitus 9/2/2015    Diverticulosis 1/28/2019    DM type 2 with diabetic peripheral neuropathy 2/4/2019    Encounter for blood transfusion     Essential hypertension 1/28/2019    Former smoker 8/26/2015    Healed ulcer of left foot on examination 6/20/2017    Hematuria     Hydrocele     approx 1.5 yrs ago    Hyperphosphatemia     Hypoalbuminemia 2/4/2019     Hypocalcemia     Lymphedema of both lower extremities 1/29/2019    Mixed hyperlipidemia 5/8/2015    Morbid obesity with BMI of 50.0-59.9, adult 5/8/2015    Onychomycosis of multiple toenails with type 2 diabetes mellitus and peripheral neuropathy 6/20/2017    Perianal cyst     approx 2 yrs ago    Proteinuria     Pseudocyst of pancreas 1/28/2019 1-28-19 Liver has a cirrhotic morphology with no focal lesions.  Significant interval increase in ascites when compared to prior exam which may account for patient's abdominal distension.  Hypodense air-fluid collection along the body of the pancreas which is slightly smaller when compared to prior CT.  Findings may relate to pancreatic necrosis with pancreatic pseudocysts with infected pseudocyst    Skin cancer     skin cancer    Sleep apnea 8/26/2015    Status post bariatric surgery 1/11/2016    Type 2 diabetes mellitus, with long-term current use of insulin 5/8/2015       Past Surgical History:  Past Surgical History:   Procedure Laterality Date    ANGIOGRAPHY N/A 6/28/2019    Procedure: ANGIOGRAM-PV STENT;  Surgeon: Ewa Diagnostic Provider;  Location: [facility] OR;  Service: Radiology;  Laterality: N/A;    ANGIOPLASTY      total x5 stents    COLONOSCOPY N/A 10/6/2015    Procedure: COLONOSCOPY;  Surgeon: Shekhar Richards MD;  Location: [facility] ENDO ([address] [place] [address]);  Service: Endoscopy;  Laterality: N/A;  BMI over 55/2nd floor case    5 day hold Plavix, Dr Kwadwo Arroyo    CORONARY ANGIOGRAPHY Right 3/20/2019    Procedure: ANGIOGRAM, CORONARY ARTERY;  Surgeon: Bob Duque MD;  Location: [facility] CATH LAB;  Service: Cardiology;  Laterality: Right;    CORONARY ARTERY BYPASS GRAFT  2017    x3    CORONARY BYPASS GRAFT ANGIOGRAPHY  3/20/2019    Procedure: Bypass graft study;  Surgeon: Bob Duque MD;  Location: [facility] CATH LAB;  Service: Cardiology;;    CYST REMOVAL      ENDOSCOPIC ULTRASOUND OF UPPER GASTROINTESTINAL TRACT N/A 2/26/2020    Procedure: ULTRASOUND,  UPPER GI TRACT, ENDOSCOPIC;  Surgeon: Robbie Yang MD;  Location: [facility] ENDO;  Service: Endoscopy;  Laterality: N/A;    ESOPHAGOGASTRODUODENOSCOPY N/A 7/8/2019    Procedure: ESOPHAGOGASTRODUODENOSCOPY (EGD);  Surgeon: Huan Brumfield MD;  Location: [facility] ENDO;  Service: Endoscopy;  Laterality: N/A;    GASTRECTOMY      INSERTION OF DIALYSIS CATHETER N/A 5/17/2019    Procedure: pleurx;  Surgeon: Dosc Diagnostic Provider;  Location: [facility] OR [address] [place] [address];  Service: General;  Laterality: N/A;  Room 188/[facility]    KNEE ARTHROSCOPY      perianal surgery      perianal cyst removed       Family History:  Family History   Problem Relation Age of Onset    Cancer Mother     Cancer Father     Heart disease Father     Obesity Sister     Parkinsonism Brother     No Known Problems Paternal Grandmother     Cancer Paternal Grandfather     Cancer Brother     Diabetes Maternal Grandmother     Stroke Maternal Grandfather     Cirrhosis Neg Hx        Allergies:  Review of patient's allergies indicates:  No Known Allergies    Medications:  Current Outpatient Medications   Medication Sig Dispense Refill    apixaban (ELIQUIS) 5 mg Tab Take 1 tablet (5 mg total) by mouth 2 (two) times daily. 60 tablet 11    blood sugar diagnostic Strp Test strips to use with meter covered by insurance to check blood sugars twice daily. Insulin dependent diabetic. 200 strip 11    blood-glucose meter Misc Glucometer covered by insurance to check blood sugars at home.  Insulin dependent diabetic. 1 each 0    cyanocobalamin, vitamin B-12, 500 mcg Subl Place 1 tablet under the tongue every Monday.       ferrous sulfate (FEOSOL ORAL) Take 325 mg by mouth once daily.       furosemide (LASIX) 20 MG tablet Take 1 tablet (20 mg total) by mouth 2 (two) times daily. 60 tablet 11    glucagon, human recombinant, (GLUCAGON EMERGENCY KIT, HUMAN,) 1 mg SolR Inject 1 mg into the muscle as needed (For severely low sugar). 1 each 2    insulin aspart U-100  "(NOVOLOG) 100 unit/mL injection Inject 6 Units into the skin 3 (three) times daily before meals.       insulin detemir U-100 (LEVEMIR FLEXTOUCH) 100 unit/mL (3 mL) SubQ InPn pen Inject 20 Units into the skin every evening. (Patient taking differently: Inject 25 Units into the skin every evening. ) 3 mL 12    lancets Misc Lancets to use with meter covered by insurance to check blood sugars twice daily.  Insulin dependent diabetic. 200 each 11    metoprolol succinate (TOPROL-XL) 25 MG 24 hr tablet Take 1 tablet (25 mg total) by mouth once daily. 30 tablet 1    metroNIDAZOLE (FLAGYL) 500 MG tablet Take 500 mg by mouth 3 (three) times daily.      pen needle, diabetic (INSUPEN) 32 gauge x 1/4" Ndle 1 each by Misc.(Non-Drug; Combo Route) route 4 (four) times daily. 360 each 3    sodium chloride 0.9% (NORMAL SALINE FLUSH) injection use as directed to flush drains daily 300 mL 4    sulfamethoxazole/trimethoprim (BACTRIM ORAL) Take by mouth.       No current facility-administered medications for this visit.        Review of Systems   Constitutional: Positive for activity change, appetite change and unexpected weight change. Negative for fatigue and fever.   HENT: Negative.    Eyes:        Wears glasses   Respiratory: Negative for cough, shortness of breath and wheezing.    Cardiovascular: Positive for palpitations and leg swelling. Negative for chest pain.   Gastrointestinal: Negative for abdominal distention, abdominal pain, constipation and diarrhea.   Genitourinary: Negative for difficulty urinating.   Musculoskeletal: Positive for arthralgias, back pain and gait problem.   Skin: Negative.    Allergic/Immunologic: Negative for immunocompromised state.   Neurological: Positive for syncope.   Hematological: Bruises/bleeds easily.   Psychiatric/Behavioral: Negative for dysphoric mood. The patient is not nervous/anxious.       Objective:      Physical Exam   Constitutional: He is oriented to person, place, and time. He " appears well-developed and well-nourished.   Eyes: Pupils are equal, round, and reactive to light. No scleral icterus.   Neck: No JVD present. No tracheal deviation present.   Cardiovascular: Normal rate and regular rhythm.   Pulmonary/Chest: Effort normal and breath sounds normal.   Abdominal: Soft. Bowel sounds are normal. He exhibits no distension. There is tenderness (Right upper quadrant). There is no guarding.        Musculoskeletal: Normal range of motion. He exhibits edema.   Neurological: He is alert and oriented to person, place, and time.   Skin: Skin is warm and dry.   Psychiatric: He has a normal mood and affect. His behavior is normal. Judgment and thought content normal.   Nursing note and vitals reviewed.     Assessment:       1. Obesity (BMI 30-39.9)    2. Chronic kidney disease, stage 3    3. Hepatic fibrosis        Laboratory Data:    Neuroendocrine Labs Latest Ref Rng & Units 5/1/2020 4/20/2020 4/20/2020 3/17/2020 3/17/2020 3/17/2020 3/13/2020   FOLATE 4.0 - 24.0 ng/mL - - - - - - -   VITAMIN B12 210 - 950 pg/mL - - - - - - -   TSH 0.400 - 4.000 uIU/mL - - - - - - -   WBC 3.90 - 12.70 K/uL - - - 6.64 - - -   HGB 14.0 - 18.0 g/dL - - - 10.0(L) - - -   HCT 40.0 - 54.0 % - - - 31.7(L) - - -   PLATLETS 150 - 350 K/uL - - - 202 - - -   PT 9.0 - 12.5 sec - - - 10.8 - - -   PTT 21.0 - 32.0 sec - - - 27.4 - - -   INR 0.8 - 1.2 - - - 1.0 - - -   GLUCOSE 70 - 110 mg/dL - - - 209(H) - - -   BUN 8 - 23 mg/dL - - - 20 - - -   CREATININE 0.5 - 1.4 mg/dL - - - 1.9(H) - - -    - 145 mmol/L - - - 138 - - -   K 3.5 - 5.1 mmol/L - - - 4.4 - - -   CHLORIDE 95 - 110 mmol/L - - - 109 - - -   CO2 23 - 29 mmol/L - - - 22(L) - - -   CALCIUM 8.7 - 10.5 mg/dL - - - 8.6(L) - - -   PROTEIN, TOTAL 6.0 - 8.4 g/dL - - - 6.1 - - -   PHOSPHORUS 2.7 - 4.5 mg/dL - - - - - - -   ALBUMIN 3.5 - 5.2 g/dL - - - 2.8(L) - - -   TOTAL BILIRUBIN 0.1 - 1.0 mg/dL - - - 0.4 - - -   DIRECT BILIRUBIN 0.1 - 0.3 mg/dL - - - - - - -   ALK  PHOSPHATASE 55 - 135 U/L - - - 151(H) - - -   GGT 8 - 55 U/L - - - - - - -   SGOT (AST) 10 - 40 U/L - - - 24 - - -   SGPT (ALT) 10 - 44 U/L - - - 27 - - -   SERUM AMYLASE 20 - 110 U/L - - - - - - -    - 260 U/L - - - - - - -   TRIGLYCERIDES 30 - 150 mg/dL - - - - - - -   CHOLERSTEROL 120 - 199 mg/dL - - - - - - -   HDL 40 - 75 mg/dL - - - - - - -   LDL 63.0 - 159.0 mg/dL - - - - - - -   MG 1.6 - 2.6 mg/dL - - - - - - -   URINE AMYLASE U/L Not established U/L - - - - - - -   24 HR URINE PROTEIN 0 - 15 mg/dL - - - - - - -   24 HR CREATININE CLEARANCE 23.0 - 375.0 mg/dL - - - - - - -   HEMOGLOBIN A1C 4.0 - 5.6 % - 9.4(H) - - - - -   Weight - 232 lbs 9 oz - 217 lbs - 248 lbs 248 lbs 248 lbs 9 oz   Sedimentation Rate, Manual 0 - 10 mm/Hr - - - - - - -         Impression:  Status post cholecystostomy tube for biliary sepsis and cholecystitis,  interval cholecystectomy       COVID-19 Discussion:     I had long discussion with patient and any applicable family about the COVID-19 coronavirus epidemic and the recommended precautions with regard to cancer and/or hematology patients. I have re-iterated the CDC recommendations for adequate hand washing, use of hand -like products, and coughing into elbow, etc. In addition, especially for our patients who are on chemotherapy and/or our otherwise immunocompromised patients, I have recommended avoidance of crowds, including movie theaters, restaurants, churches, etc. I have recommended avoidance of any sick or symptomatic family members and/or friends. I have also recommended avoidance of any raw and unwashed food products, and general avoidance of food items that have not been prepared by themselves. The patient has been asked to call us immediately with any symptom developments, issues, questions or other general concerns.   \          Plan:       Laparoscopic cholecystectomy  Risks/benefits explained and accepted.  All questions answered.  Under with last dose of  Eliquis was yesterday he will resume Eliquis postop            SON Rowe MD, FACS  Professor of Surgery, [facility]  Neuroendocrine Surgery, Hepatic/Pancreatic & General Surgery  [address], [address]  [place]  [address]  ph. [phone]; [phone]  fax. [phone]

## 2020-04-30 LAB
ACID FAST MOD KINY STN SPEC: NORMAL
MYCOBACTERIUM SPEC QL CULT: NORMAL

## 2020-05-01 ENCOUNTER — OFFICE VISIT (OUTPATIENT)
Dept: NEUROLOGY | Facility: HOSPITAL | Age: 65
End: 2020-05-01
Attending: SURGERY
Payer: MEDICARE

## 2020-05-01 ENCOUNTER — HOSPITAL ENCOUNTER (OUTPATIENT)
Dept: RADIOLOGY | Facility: HOSPITAL | Age: 65
Discharge: HOME | End: 2020-05-01
Attending: SURGERY
Payer: MEDICARE

## 2020-05-01 ENCOUNTER — CLINICAL SUPPORT (OUTPATIENT)
Dept: LAB | Facility: HOSPITAL | Age: 65
End: 2020-05-01
Attending: SURGERY
Payer: MEDICARE

## 2020-05-01 ENCOUNTER — TELEPHONE (OUTPATIENT)
Dept: NEUROLOGY | Facility: HOSPITAL | Age: 65
End: 2020-05-01

## 2020-05-01 ENCOUNTER — ANESTHESIA EVENT (OUTPATIENT)
Dept: SURGERY | Facility: HOSPITAL | Age: 65
End: 2020-05-01
Payer: MEDICARE

## 2020-05-01 VITALS
BODY MASS INDEX: 36.5 KG/M2 | HEART RATE: 73 BPM | DIASTOLIC BLOOD PRESSURE: 67 MMHG | SYSTOLIC BLOOD PRESSURE: 126 MMHG | HEIGHT: 67 IN | WEIGHT: 232.56 LBS

## 2020-05-01 DIAGNOSIS — T85.518A CHOLECYSTOSTOMY TUBE DYSFUNCTION, INITIAL ENCOUNTER: Primary | ICD-10-CM

## 2020-05-01 DIAGNOSIS — K74.00 HEPATIC FIBROSIS: ICD-10-CM

## 2020-05-01 DIAGNOSIS — K80.10 CALCULUS OF GALLBLADDER WITH CHRONIC CHOLECYSTITIS WITHOUT OBSTRUCTION: Primary | ICD-10-CM

## 2020-05-01 DIAGNOSIS — E66.9 OBESITY (BMI 30-39.9): ICD-10-CM

## 2020-05-01 DIAGNOSIS — T85.518A CHOLECYSTOSTOMY TUBE DYSFUNCTION, INITIAL ENCOUNTER: ICD-10-CM

## 2020-05-01 DIAGNOSIS — Z01.810 PREOP CARDIOVASCULAR EXAM: ICD-10-CM

## 2020-05-01 DIAGNOSIS — Z01.818 PREOP EXAMINATION: ICD-10-CM

## 2020-05-01 DIAGNOSIS — N18.30 CHRONIC KIDNEY DISEASE, STAGE 3: Chronic | ICD-10-CM

## 2020-05-01 PROCEDURE — 99214 OFFICE O/P EST MOD 30 MIN: CPT | Mod: 25 | Performed by: SURGERY

## 2020-05-01 PROCEDURE — 93005 ELECTROCARDIOGRAM TRACING: CPT

## 2020-05-01 PROCEDURE — 71046 XR CHEST PA AND LATERAL: ICD-10-PCS | Mod: 26,,, | Performed by: RADIOLOGY

## 2020-05-01 PROCEDURE — 71046 X-RAY EXAM CHEST 2 VIEWS: CPT | Mod: 26,,, | Performed by: RADIOLOGY

## 2020-05-01 PROCEDURE — 71046 X-RAY EXAM CHEST 2 VIEWS: CPT | Mod: TC,FY

## 2020-05-01 RX ORDER — METRONIDAZOLE 500 MG/100ML
500 INJECTION, SOLUTION INTRAVENOUS
Status: CANCELLED | OUTPATIENT
Start: 2020-05-01

## 2020-05-01 RX ORDER — ENOXAPARIN SODIUM 100 MG/ML
30 INJECTION SUBCUTANEOUS EVERY 24 HOURS
Status: CANCELLED | OUTPATIENT
Start: 2020-05-01

## 2020-05-01 RX ORDER — SODIUM CHLORIDE 9 MG/ML
INJECTION, SOLUTION INTRAVENOUS CONTINUOUS
Status: CANCELLED | OUTPATIENT
Start: 2020-05-01

## 2020-05-01 RX ORDER — LIDOCAINE HYDROCHLORIDE 10 MG/ML
1 INJECTION, SOLUTION EPIDURAL; INFILTRATION; INTRACAUDAL; PERINEURAL ONCE
Status: CANCELLED | OUTPATIENT
Start: 2020-05-01 | End: 2020-05-01

## 2020-05-01 NOTE — TELEPHONE ENCOUNTER
Receive glucose result from lab. Glucose 784. Notified Dr. Rowe who has ordered pt to take today's doses of insulin as prescribed.

## 2020-05-01 NOTE — ANESTHESIA PREPROCEDURE EVALUATION
05/01/2020     Jian Arrieta is a 64 y.o., male  with h/o CAD, chronic pancreatitis, HTN, chronic Lymphedema, former smoker, DM II, CABG in x3 years ago (2017), last stents were placed x2 years ago (2018) scheduled for laparoscopic cholecystectomy on 5/4/2020.    ETT note 2015: mask vent mod difficulty, Gr 1v with Butcher, short neck, obesity, 1 attempt    Past Medical History:   Diagnosis Date    Alcohol abuse     Anasarca 1/28/2019    Anemia     Arthropathy associated with neurological disorder 9/2/2015    Atherosclerosis     Charcot foot due to diabetes mellitus     Chronic combined systolic and diastolic heart failure 01/29/2019 1-28-19 Left VentricleModerate decreased ejection fraction at 30%. Normal left ventricular cavity size. Normal wall thickness observed. Grade I (mild) left ventricular diastolic dysfunction consistent with impaired relaxation. Normal left atrial pressure. Moderate, global hypokinesis(see wall scoring diagram). Right VentricleNormal cavity size, wall thickness and ejection fraction. Wall motion n    Chronic pancreatitis 1/28/2019    CKD (chronic kidney disease) stage 3, GFR 30-59 ml/min     Colon polyps     approx 5 yrs ago    Coronary artery disease due to calcified coronary lesion 05/08/2015    5 stents on ASA      Diabetic polyneuropathy associated with type 2 diabetes mellitus 9/2/2015    Diverticulosis 1/28/2019    DM type 2 with diabetic peripheral neuropathy 2/4/2019    Encounter for blood transfusion     Essential hypertension 1/28/2019    Former smoker 8/26/2015    Healed ulcer of left foot on examination 6/20/2017    Hematuria     Hydrocele     approx 1.5 yrs ago    Hyperphosphatemia     Hypoalbuminemia 2/4/2019    Hypocalcemia     Lymphedema of both lower extremities 1/29/2019    Mixed hyperlipidemia 5/8/2015    Morbid obesity with BMI of  50.0-59.9, adult 5/8/2015    Onychomycosis of multiple toenails with type 2 diabetes mellitus and peripheral neuropathy 6/20/2017    Perianal cyst     approx 2 yrs ago    Proteinuria     Pseudocyst of pancreas 1/28/2019 1-28-19 Liver has a cirrhotic morphology with no focal lesions.  Significant interval increase in ascites when compared to prior exam which may account for patient's abdominal distension.  Hypodense air-fluid collection along the body of the pancreas which is slightly smaller when compared to prior CT.  Findings may relate to pancreatic necrosis with pancreatic pseudocysts with infected pseudocyst    Skin cancer     skin cancer    Sleep apnea 8/26/2015    Status post bariatric surgery 1/11/2016    Type 2 diabetes mellitus, with long-term current use of insulin 5/8/2015     Past Surgical History:   Procedure Laterality Date    ANGIOGRAPHY N/A 6/28/2019    Procedure: ANGIOGRAM-PV STENT;  Surgeon: Ewa Diagnostic Provider;  Location: [facility] OR;  Service: Radiology;  Laterality: N/A;    ANGIOPLASTY      total x5 stents    COLONOSCOPY N/A 10/6/2015    Procedure: COLONOSCOPY;  Surgeon: Shekhar Richards MD;  [facility] ([address] [place] [address]);  Service: Endoscopy;  Laterality: N/A;  BMI over 55/2nd floor case    5 day hold Plavix, Dr Kwadwo Arroyo    CORONARY ANGIOGRAPHY Right 3/20/2019    Procedure: ANGIOGRAM, CORONARY ARTERY;  Surgeon: Bob Duque MD;  Location: [facility] CATH LAB;  Service: Cardiology;  Laterality: Right;    CORONARY ARTERY BYPASS GRAFT  2017    x3    CORONARY BYPASS GRAFT ANGIOGRAPHY  3/20/2019    Procedure: Bypass graft study;  Surgeon: Bob Duque MD;  Location: [facility] CATH LAB;  Service: Cardiology;;    CYST REMOVAL      ENDOSCOPIC ULTRASOUND OF UPPER GASTROINTESTINAL TRACT N/A 2/26/2020    Procedure: ULTRASOUND, UPPER GI TRACT, ENDOSCOPIC;  Surgeon: Robbie Yang MD;  Location: [facility] ENDO;  Service: Endoscopy;  Laterality: N/A;    ESOPHAGOGASTRODUODENOSCOPY N/A  7/8/2019    Procedure: ESOPHAGOGASTRODUODENOSCOPY (EGD);  Surgeon: Huan Brumfield MD;  Location: [facility];  Service: Endoscopy;  Laterality: N/A;    GASTRECTOMY      INSERTION OF DIALYSIS CATHETER N/A 5/17/2019    Procedure: pleurx;  Surgeon: Ewa Diagnostic Provider;  Location: [facility] OR [address] [place] [address];  Service: General;  Laterality: N/A;  Room 188/[facility]    KNEE ARTHROSCOPY      perianal surgery      perianal cyst removed       Wt Readings from Last 3 Encounters:   05/01/20 105.5 kg (232 lb 9.4 oz)   04/20/20 98.4 kg (217 lb)   03/17/20 112.5 kg (248 lb)     Temp Readings from Last 3 Encounters:   04/20/20 36.4 °C (97.6 °F)   03/17/20 37.1 °C (98.7 °F)   03/17/20 36.9 °C (98.5 °F) (Oral)     BP Readings from Last 3 Encounters:   05/01/20 126/67   04/20/20 127/61   03/17/20 120/65     Pulse Readings from Last 3 Encounters:   05/01/20 73   04/20/20 74   03/17/20 104         Anesthesia Evaluation    I have reviewed the Patient Summary Reports.    I have reviewed the Nursing Notes.   I have reviewed the Medications.     Review of Systems  Anesthesia Hx:  No problems with previous Anesthesia  Denies Family Hx of Anesthesia complications.    Social:  Former Smoker, Alcohol Use    Hematology/Oncology:         -- Anemia:   Cardiovascular:   Exercise tolerance: poor Hypertension, well controlled Past MI CAD  CABG/stent (3V CABG)  CHF (recovered EF 55-60%) PVD hyperlipidemia CHURCH NYHA Classification III ECG has been reviewed.    Pulmonary:   Denies Shortness of breath. Sleep Apnea, CPAP    Renal/:   Chronic Renal Disease, CRI CKD 4   Hepatic/GI:   Liver Disease, (Fatty liver, hepatic fibrosis)    Neurological:   Peripheral Neuropathy (diabetic peripheral neuropathy)    Endocrine:   Diabetes, poorly controlled, using insulin    Psych:   Psychiatric History          Physical Exam  General:  Obesity    Airway/Jaw/Neck:  Airway Findings: Mouth Opening: Normal Tongue: Normal  General Airway Assessment: Adult  Mallampati: II   Improves to II with phonation.  TM Distance: Normal, at least 6 cm  Jaw/Neck Findings:     Neck ROM: Normal ROM      Dental:  Dental Findings: (Multiple missing and decaying teeth)   Chest/Lungs:  Chest/Lungs Findings: Clear to auscultation, Normal Respiratory Rate     Heart/Vascular:  Heart Findings: Rate: Normal  Rhythm: Regular Rhythm  Sounds: Normal        Mental Status:  Mental Status Findings:  Cooperative, Alert and Oriented       Lab Results   Component Value Date    WBC 6.64 03/17/2020    HGB 10.0 (L) 03/17/2020    HCT 31.7 (L) 03/17/2020    MCV 90 03/17/2020     03/17/2020       Chemistry        Component Value Date/Time     03/17/2020 0539    K 4.4 03/17/2020 0539     03/17/2020 0539    CO2 22 (L) 03/17/2020 0539    BUN 20 03/17/2020 0539    CREATININE 1.9 (H) 03/17/2020 0539     (H) 03/17/2020 0539        Component Value Date/Time    CALCIUM 8.6 (L) 03/17/2020 0539    ALKPHOS 151 (H) 03/17/2020 0539    AST 24 03/17/2020 0539    ALT 27 03/17/2020 0539    BILITOT 0.4 03/17/2020 0539    ESTGFRAFRICA 42 (A) 03/17/2020 0539    EGFRNONAA 36 (A) 03/17/2020 0539      Conclusion     · Mildly decreased left ventricular systolic function. The estimated ejection fraction is 45-50%  · Left ventricular diastolic dysfunction.  · Normal right ventricular systolic function.  · Normal central venous pressure (3 mm Hg).  · Extremely technically challenging study, poor endocardial visualization, would recommend repeating with contrast if additional information is desired.   Electronically signed by Luisito Joiner MD on 6/27/19 at 1359 CDT     Echo 6/27/19  · Mildly decreased left ventricular systolic function. The estimated ejection fraction is 45-50%  · Left ventricular diastolic dysfunction.  · Normal right ventricular systolic function.  · Normal central venous pressure (3 mm Hg).  · Extremely technically challenging study, poor endocardial visualization, would recommend repeating with  contrast if additional information is desired.           Anesthesia Plan  Type of Anesthesia, risks & benefits discussed:  Anesthesia Type:  general  Patient's Preference:   Intra-op Monitoring Plan: standard ASA monitors  Intra-op Monitoring Plan Comments:   Post Op Pain Control Plan: multimodal analgesia  Post Op Pain Control Plan Comments:   Induction:   IV  Beta Blocker:         Informed Consent: Patient understands risks and agrees with Anesthesia plan.  Questions answered. Anesthesia consent signed with patient.  ASA Score: 3     Day of Surgery Review of History & Physical:        Anesthesia Plan Notes: Anesthesia consent to be signed prior to procedure on 5/4/2020        Ready For Surgery From Anesthesia Perspective.

## 2020-05-02 ENCOUNTER — LAB VISIT (OUTPATIENT)
Dept: INTERNAL MEDICINE | Facility: CLINIC | Age: 65
End: 2020-05-02
Payer: MEDICARE

## 2020-05-02 DIAGNOSIS — Z01.818 PREOPERATIVE TESTING: ICD-10-CM

## 2020-05-02 LAB — SARS-COV-2 RNA RESP QL NAA+PROBE: NOT DETECTED

## 2020-05-02 PROCEDURE — U0002 COVID-19 LAB TEST NON-CDC: HCPCS

## 2020-05-04 ENCOUNTER — HOSPITAL ENCOUNTER (OUTPATIENT)
Facility: HOSPITAL | Age: 65
Discharge: HOME | End: 2020-05-05
Attending: SURGERY | Admitting: SURGERY
Payer: MEDICARE

## 2020-05-04 ENCOUNTER — ANESTHESIA (OUTPATIENT)
Dept: SURGERY | Facility: HOSPITAL | Age: 65
End: 2020-05-04
Payer: MEDICARE

## 2020-05-04 DIAGNOSIS — K80.10 CALCULUS OF GALLBLADDER WITH CHRONIC CHOLECYSTITIS WITHOUT OBSTRUCTION: Primary | ICD-10-CM

## 2020-05-04 DIAGNOSIS — K74.69 OTHER CIRRHOSIS OF LIVER: ICD-10-CM

## 2020-05-04 DIAGNOSIS — Z01.818 PREOPERATIVE TESTING: ICD-10-CM

## 2020-05-04 LAB
POCT GLUCOSE: 162 MG/DL (ref 70–110)
POCT GLUCOSE: 174 MG/DL (ref 70–110)
POCT GLUCOSE: 262 MG/DL (ref 70–110)
POCT GLUCOSE: 74 MG/DL (ref 70–110)

## 2020-05-04 PROCEDURE — 88313 SPECIAL STAINS GROUP 2: CPT | Mod: 26,,, | Performed by: PATHOLOGY

## 2020-05-04 PROCEDURE — 63600175 PHARM REV CODE 636 W HCPCS: Performed by: SURGERY

## 2020-05-04 PROCEDURE — 88313 PR  SPECIAL STAINS,GROUP II: ICD-10-PCS | Mod: 26,,, | Performed by: PATHOLOGY

## 2020-05-04 PROCEDURE — 88307 PR  SURG PATH,LEVEL V: ICD-10-PCS | Mod: 26,,, | Performed by: PATHOLOGY

## 2020-05-04 PROCEDURE — 25000003 PHARM REV CODE 250: Performed by: STUDENT IN AN ORGANIZED HEALTH CARE EDUCATION/TRAINING PROGRAM

## 2020-05-04 PROCEDURE — 88304 TISSUE EXAM BY PATHOLOGIST: CPT | Performed by: PATHOLOGY

## 2020-05-04 PROCEDURE — 88307 TISSUE EXAM BY PATHOLOGIST: CPT | Mod: 26,,, | Performed by: PATHOLOGY

## 2020-05-04 PROCEDURE — 25000003 PHARM REV CODE 250: Performed by: NURSE ANESTHETIST, CERTIFIED REGISTERED

## 2020-05-04 PROCEDURE — 63600175 PHARM REV CODE 636 W HCPCS: Performed by: NURSE ANESTHETIST, CERTIFIED REGISTERED

## 2020-05-04 PROCEDURE — 88307 TISSUE EXAM BY PATHOLOGIST: CPT | Performed by: PATHOLOGY

## 2020-05-04 PROCEDURE — S0030 INJECTION, METRONIDAZOLE: HCPCS | Performed by: FAMILY MEDICINE

## 2020-05-04 PROCEDURE — 88313 SPECIAL STAINS GROUP 2: CPT | Mod: 59 | Performed by: PATHOLOGY

## 2020-05-04 PROCEDURE — S0030 INJECTION, METRONIDAZOLE: HCPCS | Performed by: SURGERY

## 2020-05-04 PROCEDURE — 36000709 HC OR TIME LEV III EA ADD 15 MIN: Performed by: SURGERY

## 2020-05-04 PROCEDURE — 27201423 OPTIME MED/SURG SUP & DEVICES STERILE SUPPLY: Performed by: SURGERY

## 2020-05-04 PROCEDURE — 94761 N-INVAS EAR/PLS OXIMETRY MLT: CPT

## 2020-05-04 PROCEDURE — 88304 PR  SURG PATH,LEVEL III: ICD-10-PCS | Mod: 26,,, | Performed by: PATHOLOGY

## 2020-05-04 PROCEDURE — 63600175 PHARM REV CODE 636 W HCPCS: Performed by: FAMILY MEDICINE

## 2020-05-04 PROCEDURE — 94799 UNLISTED PULMONARY SVC/PX: CPT

## 2020-05-04 PROCEDURE — 37000008 HC ANESTHESIA 1ST 15 MINUTES: Performed by: SURGERY

## 2020-05-04 PROCEDURE — C9290 INJ, BUPIVACAINE LIPOSOME: HCPCS | Performed by: SURGERY

## 2020-05-04 PROCEDURE — 37000009 HC ANESTHESIA EA ADD 15 MINS: Performed by: SURGERY

## 2020-05-04 PROCEDURE — 25000003 PHARM REV CODE 250: Performed by: SURGERY

## 2020-05-04 PROCEDURE — 88304 TISSUE EXAM BY PATHOLOGIST: CPT | Mod: 26,,, | Performed by: PATHOLOGY

## 2020-05-04 PROCEDURE — G0378 HOSPITAL OBSERVATION PER HR: HCPCS

## 2020-05-04 PROCEDURE — 71000039 HC RECOVERY, EACH ADD'L HOUR: Performed by: SURGERY

## 2020-05-04 PROCEDURE — 63600175 PHARM REV CODE 636 W HCPCS: Performed by: ANESTHESIOLOGY

## 2020-05-04 PROCEDURE — 71000033 HC RECOVERY, INTIAL HOUR: Performed by: SURGERY

## 2020-05-04 PROCEDURE — 36000708 HC OR TIME LEV III 1ST 15 MIN: Performed by: SURGERY

## 2020-05-04 PROCEDURE — 25000003 PHARM REV CODE 250: Performed by: FAMILY MEDICINE

## 2020-05-04 RX ORDER — BACITRACIN 50000 [IU]/1
INJECTION, POWDER, FOR SOLUTION INTRAMUSCULAR
Status: DISCONTINUED | OUTPATIENT
Start: 2020-05-04 | End: 2020-05-04 | Stop reason: HOSPADM

## 2020-05-04 RX ORDER — ENOXAPARIN SODIUM 100 MG/ML
40 INJECTION SUBCUTANEOUS EVERY 24 HOURS
Status: DISCONTINUED | OUTPATIENT
Start: 2020-05-05 | End: 2020-05-04

## 2020-05-04 RX ORDER — HYDROCODONE BITARTRATE AND ACETAMINOPHEN 7.5; 325 MG/1; MG/1
1 TABLET ORAL EVERY 6 HOURS PRN
Status: DISCONTINUED | OUTPATIENT
Start: 2020-05-04 | End: 2020-05-05 | Stop reason: HOSPADM

## 2020-05-04 RX ORDER — BUPIVACAINE HYDROCHLORIDE 2.5 MG/ML
INJECTION, SOLUTION EPIDURAL; INFILTRATION; INTRACAUDAL
Status: DISCONTINUED | OUTPATIENT
Start: 2020-05-04 | End: 2020-05-04 | Stop reason: HOSPADM

## 2020-05-04 RX ORDER — ONDANSETRON 2 MG/ML
INJECTION INTRAMUSCULAR; INTRAVENOUS
Status: DISCONTINUED | OUTPATIENT
Start: 2020-05-04 | End: 2020-05-04

## 2020-05-04 RX ORDER — HYDROCODONE BITARTRATE AND ACETAMINOPHEN 5; 325 MG/1; MG/1
1 TABLET ORAL EVERY 6 HOURS PRN
Status: DISCONTINUED | OUTPATIENT
Start: 2020-05-04 | End: 2020-05-05 | Stop reason: HOSPADM

## 2020-05-04 RX ORDER — METRONIDAZOLE 500 MG/100ML
500 INJECTION, SOLUTION INTRAVENOUS
Status: DISCONTINUED | OUTPATIENT
Start: 2020-05-04 | End: 2020-05-05 | Stop reason: HOSPADM

## 2020-05-04 RX ORDER — OXYCODONE HYDROCHLORIDE 5 MG/1
5 TABLET ORAL
Status: DISCONTINUED | OUTPATIENT
Start: 2020-05-04 | End: 2020-05-04 | Stop reason: HOSPADM

## 2020-05-04 RX ORDER — KETOROLAC TROMETHAMINE 30 MG/ML
INJECTION, SOLUTION INTRAMUSCULAR; INTRAVENOUS
Status: DISCONTINUED | OUTPATIENT
Start: 2020-05-04 | End: 2020-05-04

## 2020-05-04 RX ORDER — ENOXAPARIN SODIUM 100 MG/ML
30 INJECTION SUBCUTANEOUS EVERY 24 HOURS
Status: DISCONTINUED | OUTPATIENT
Start: 2020-05-04 | End: 2020-05-04

## 2020-05-04 RX ORDER — ONDANSETRON 2 MG/ML
4 INJECTION INTRAMUSCULAR; INTRAVENOUS DAILY PRN
Status: DISCONTINUED | OUTPATIENT
Start: 2020-05-04 | End: 2020-05-04 | Stop reason: HOSPADM

## 2020-05-04 RX ORDER — SODIUM CHLORIDE, SODIUM LACTATE, POTASSIUM CHLORIDE, CALCIUM CHLORIDE 600; 310; 30; 20 MG/100ML; MG/100ML; MG/100ML; MG/100ML
INJECTION, SOLUTION INTRAVENOUS CONTINUOUS PRN
Status: DISCONTINUED | OUTPATIENT
Start: 2020-05-04 | End: 2020-05-04

## 2020-05-04 RX ORDER — METRONIDAZOLE 500 MG/100ML
500 INJECTION, SOLUTION INTRAVENOUS
Status: COMPLETED | OUTPATIENT
Start: 2020-05-04 | End: 2020-05-04

## 2020-05-04 RX ORDER — HYDROMORPHONE HYDROCHLORIDE 2 MG/ML
0.5 INJECTION, SOLUTION INTRAMUSCULAR; INTRAVENOUS; SUBCUTANEOUS EVERY 5 MIN PRN
Status: DISCONTINUED | OUTPATIENT
Start: 2020-05-04 | End: 2020-05-04 | Stop reason: HOSPADM

## 2020-05-04 RX ORDER — DEXAMETHASONE SODIUM PHOSPHATE 4 MG/ML
INJECTION, SOLUTION INTRA-ARTICULAR; INTRALESIONAL; INTRAMUSCULAR; INTRAVENOUS; SOFT TISSUE
Status: DISCONTINUED | OUTPATIENT
Start: 2020-05-04 | End: 2020-05-04

## 2020-05-04 RX ORDER — ROCURONIUM BROMIDE 10 MG/ML
INJECTION, SOLUTION INTRAVENOUS
Status: DISCONTINUED | OUTPATIENT
Start: 2020-05-04 | End: 2020-05-04

## 2020-05-04 RX ORDER — INSULIN ASPART 100 [IU]/ML
6 INJECTION, SOLUTION INTRAVENOUS; SUBCUTANEOUS
Status: DISCONTINUED | OUTPATIENT
Start: 2020-05-04 | End: 2020-05-04

## 2020-05-04 RX ORDER — MIDAZOLAM HYDROCHLORIDE 1 MG/ML
INJECTION, SOLUTION INTRAMUSCULAR; INTRAVENOUS
Status: DISCONTINUED | OUTPATIENT
Start: 2020-05-04 | End: 2020-05-04

## 2020-05-04 RX ORDER — MORPHINE SULFATE 4 MG/ML
4 SYRINGE (ML) INJECTION EVERY 4 HOURS PRN
Status: DISCONTINUED | OUTPATIENT
Start: 2020-05-04 | End: 2020-05-04

## 2020-05-04 RX ORDER — FUROSEMIDE 20 MG/1
20 TABLET ORAL 2 TIMES DAILY
Status: DISCONTINUED | OUTPATIENT
Start: 2020-05-04 | End: 2020-05-05 | Stop reason: HOSPADM

## 2020-05-04 RX ORDER — INSULIN ASPART 100 [IU]/ML
2 INJECTION, SOLUTION INTRAVENOUS; SUBCUTANEOUS
Status: DISCONTINUED | OUTPATIENT
Start: 2020-05-05 | End: 2020-05-04

## 2020-05-04 RX ORDER — NEOSTIGMINE METHYLSULFATE 1 MG/ML
INJECTION, SOLUTION INTRAVENOUS
Status: DISCONTINUED | OUTPATIENT
Start: 2020-05-04 | End: 2020-05-04

## 2020-05-04 RX ORDER — ACETAMINOPHEN 10 MG/ML
INJECTION, SOLUTION INTRAVENOUS
Status: DISCONTINUED | OUTPATIENT
Start: 2020-05-04 | End: 2020-05-04

## 2020-05-04 RX ORDER — MORPHINE SULFATE 2 MG/ML
2 INJECTION, SOLUTION INTRAMUSCULAR; INTRAVENOUS EVERY 4 HOURS PRN
Status: DISCONTINUED | OUTPATIENT
Start: 2020-05-04 | End: 2020-05-05 | Stop reason: HOSPADM

## 2020-05-04 RX ORDER — PROPOFOL 10 MG/ML
VIAL (ML) INTRAVENOUS
Status: DISCONTINUED | OUTPATIENT
Start: 2020-05-04 | End: 2020-05-04

## 2020-05-04 RX ORDER — VECURONIUM BROMIDE FOR INJECTION 1 MG/ML
INJECTION, POWDER, LYOPHILIZED, FOR SOLUTION INTRAVENOUS
Status: DISCONTINUED | OUTPATIENT
Start: 2020-05-04 | End: 2020-05-04

## 2020-05-04 RX ORDER — LIDOCAINE HYDROCHLORIDE 10 MG/ML
1 INJECTION, SOLUTION EPIDURAL; INFILTRATION; INTRACAUDAL; PERINEURAL ONCE
Status: DISCONTINUED | OUTPATIENT
Start: 2020-05-04 | End: 2020-05-04 | Stop reason: HOSPADM

## 2020-05-04 RX ORDER — VASOPRESSIN 20 [USP'U]/ML
INJECTION, SOLUTION INTRAMUSCULAR; SUBCUTANEOUS
Status: DISCONTINUED | OUTPATIENT
Start: 2020-05-04 | End: 2020-05-04

## 2020-05-04 RX ORDER — INSULIN ASPART 100 [IU]/ML
2 INJECTION, SOLUTION INTRAVENOUS; SUBCUTANEOUS
Status: DISCONTINUED | OUTPATIENT
Start: 2020-05-04 | End: 2020-05-05 | Stop reason: HOSPADM

## 2020-05-04 RX ORDER — FENTANYL CITRATE 50 UG/ML
INJECTION, SOLUTION INTRAMUSCULAR; INTRAVENOUS
Status: DISCONTINUED | OUTPATIENT
Start: 2020-05-04 | End: 2020-05-04

## 2020-05-04 RX ORDER — LIDOCAINE HYDROCHLORIDE 20 MG/ML
INJECTION INTRAVENOUS
Status: DISCONTINUED | OUTPATIENT
Start: 2020-05-04 | End: 2020-05-04

## 2020-05-04 RX ORDER — ENOXAPARIN SODIUM 100 MG/ML
40 INJECTION SUBCUTANEOUS EVERY 24 HOURS
Status: DISCONTINUED | OUTPATIENT
Start: 2020-05-05 | End: 2020-05-05 | Stop reason: HOSPADM

## 2020-05-04 RX ORDER — PHENYLEPHRINE HYDROCHLORIDE 10 MG/ML
INJECTION INTRAVENOUS
Status: DISCONTINUED | OUTPATIENT
Start: 2020-05-04 | End: 2020-05-04

## 2020-05-04 RX ORDER — SODIUM CHLORIDE 9 MG/ML
INJECTION, SOLUTION INTRAVENOUS CONTINUOUS
Status: DISCONTINUED | OUTPATIENT
Start: 2020-05-04 | End: 2020-05-05

## 2020-05-04 RX ORDER — EPHEDRINE SULFATE 50 MG/ML
INJECTION, SOLUTION INTRAVENOUS
Status: DISCONTINUED | OUTPATIENT
Start: 2020-05-04 | End: 2020-05-04

## 2020-05-04 RX ORDER — ONDANSETRON 2 MG/ML
4 INJECTION INTRAMUSCULAR; INTRAVENOUS EVERY 6 HOURS PRN
Status: DISCONTINUED | OUTPATIENT
Start: 2020-05-05 | End: 2020-05-05 | Stop reason: HOSPADM

## 2020-05-04 RX ORDER — GLYCOPYRROLATE 0.2 MG/ML
INJECTION INTRAMUSCULAR; INTRAVENOUS
Status: DISCONTINUED | OUTPATIENT
Start: 2020-05-04 | End: 2020-05-04

## 2020-05-04 RX ADMIN — INSULIN ASPART 2 UNITS: 100 INJECTION, SOLUTION INTRAVENOUS; SUBCUTANEOUS at 06:05

## 2020-05-04 RX ADMIN — VASOPRESSIN 1 UNITS: 20 INJECTION, SOLUTION INTRAMUSCULAR; SUBCUTANEOUS at 09:05

## 2020-05-04 RX ADMIN — FENTANYL CITRATE 50 MCG: 50 INJECTION, SOLUTION INTRAMUSCULAR; INTRAVENOUS at 09:05

## 2020-05-04 RX ADMIN — MORPHINE SULFATE 2 MG: 2 INJECTION, SOLUTION INTRAMUSCULAR; INTRAVENOUS at 11:05

## 2020-05-04 RX ADMIN — INSULIN ASPART 6 UNITS: 100 INJECTION, SOLUTION INTRAVENOUS; SUBCUTANEOUS at 04:05

## 2020-05-04 RX ADMIN — MORPHINE SULFATE 2 MG: 2 INJECTION, SOLUTION INTRAMUSCULAR; INTRAVENOUS at 04:05

## 2020-05-04 RX ADMIN — SODIUM CHLORIDE, SODIUM LACTATE, POTASSIUM CHLORIDE, AND CALCIUM CHLORIDE: .6; .31; .03; .02 INJECTION, SOLUTION INTRAVENOUS at 11:05

## 2020-05-04 RX ADMIN — FENTANYL CITRATE 100 MCG: 50 INJECTION, SOLUTION INTRAMUSCULAR; INTRAVENOUS at 09:05

## 2020-05-04 RX ADMIN — EPHEDRINE SULFATE 10 MG: 50 INJECTION, SOLUTION INTRAMUSCULAR; INTRAVENOUS; SUBCUTANEOUS at 10:05

## 2020-05-04 RX ADMIN — DEXAMETHASONE SODIUM PHOSPHATE 8 MG: 4 INJECTION, SOLUTION INTRA-ARTICULAR; INTRALESIONAL; INTRAMUSCULAR; INTRAVENOUS; SOFT TISSUE at 09:05

## 2020-05-04 RX ADMIN — GLYCOPYRROLATE 0.6 MG: 0.2 INJECTION, SOLUTION INTRAMUSCULAR; INTRAVENOUS at 11:05

## 2020-05-04 RX ADMIN — FENTANYL CITRATE 50 MCG: 50 INJECTION, SOLUTION INTRAMUSCULAR; INTRAVENOUS at 11:05

## 2020-05-04 RX ADMIN — HYDROMORPHONE HYDROCHLORIDE 0.5 MG: 2 INJECTION, SOLUTION INTRAMUSCULAR; INTRAVENOUS; SUBCUTANEOUS at 12:05

## 2020-05-04 RX ADMIN — ENOXAPARIN SODIUM 30 MG: 30 INJECTION SUBCUTANEOUS at 08:05

## 2020-05-04 RX ADMIN — SODIUM CHLORIDE 1000 ML: 0.9 INJECTION, SOLUTION INTRAVENOUS at 11:05

## 2020-05-04 RX ADMIN — PROPOFOL 150 MG: 10 INJECTION, EMULSION INTRAVENOUS at 09:05

## 2020-05-04 RX ADMIN — FUROSEMIDE 20 MG: 20 TABLET ORAL at 08:05

## 2020-05-04 RX ADMIN — ROCURONIUM BROMIDE 50 MG: 10 INJECTION, SOLUTION INTRAVENOUS at 09:05

## 2020-05-04 RX ADMIN — ONDANSETRON 4 MG: 2 INJECTION INTRAMUSCULAR; INTRAVENOUS at 11:05

## 2020-05-04 RX ADMIN — LIDOCAINE HYDROCHLORIDE 100 MG: 20 INJECTION, SOLUTION INTRAVENOUS at 09:05

## 2020-05-04 RX ADMIN — VECURONIUM BROMIDE 5 MG: 10 INJECTION, POWDER, LYOPHILIZED, FOR SOLUTION INTRAVENOUS at 09:05

## 2020-05-04 RX ADMIN — HYDROCODONE BITARTRATE AND ACETAMINOPHEN 1 TABLET: 7.5; 325 TABLET ORAL at 06:05

## 2020-05-04 RX ADMIN — NEOSTIGMINE METHYLSULFATE 5 MG: 1 INJECTION INTRAVENOUS at 11:05

## 2020-05-04 RX ADMIN — KETOROLAC TROMETHAMINE 30 MG: 30 INJECTION, SOLUTION INTRAMUSCULAR at 11:05

## 2020-05-04 RX ADMIN — PHENYLEPHRINE HYDROCHLORIDE 100 MCG: 10 INJECTION INTRAVENOUS at 09:05

## 2020-05-04 RX ADMIN — EPHEDRINE SULFATE 10 MG: 50 INJECTION, SOLUTION INTRAMUSCULAR; INTRAVENOUS; SUBCUTANEOUS at 11:05

## 2020-05-04 RX ADMIN — SODIUM CHLORIDE, SODIUM LACTATE, POTASSIUM CHLORIDE, AND CALCIUM CHLORIDE: .6; .31; .03; .02 INJECTION, SOLUTION INTRAVENOUS at 08:05

## 2020-05-04 RX ADMIN — ACETAMINOPHEN 1000 MG: 10 INJECTION, SOLUTION INTRAVENOUS at 09:05

## 2020-05-04 RX ADMIN — METRONIDAZOLE 500 MG: 500 SOLUTION INTRAVENOUS at 09:05

## 2020-05-04 RX ADMIN — MIDAZOLAM 2 MG: 1 INJECTION INTRAMUSCULAR; INTRAVENOUS at 09:05

## 2020-05-04 RX ADMIN — VASOPRESSIN 2 UNITS: 20 INJECTION, SOLUTION INTRAMUSCULAR; SUBCUTANEOUS at 09:05

## 2020-05-04 RX ADMIN — CEFTRIAXONE 2 G: 2 INJECTION, SOLUTION INTRAVENOUS at 09:05

## 2020-05-04 RX ADMIN — MANNITOL 12.5 G: 12.5 INJECTION, SOLUTION INTRAVENOUS at 08:05

## 2020-05-04 RX ADMIN — VASOPRESSIN 2 UNITS: 20 INJECTION, SOLUTION INTRAMUSCULAR; SUBCUTANEOUS at 10:05

## 2020-05-04 RX ADMIN — ONDANSETRON 8 MG: 2 INJECTION, SOLUTION INTRAMUSCULAR; INTRAVENOUS at 11:05

## 2020-05-04 RX ADMIN — METRONIDAZOLE 500 MG: 500 INJECTION, SOLUTION INTRAVENOUS at 06:05

## 2020-05-04 NOTE — H&P
"Surgery H&P  05/04/2020    CC: "gallbladder problems"    HPI: 63 y.o. male with PMH morbid obesity (BMI 41), stage 1 hepatic fibrosis (biopsy negative for cirrhosis), chronic pancreatitis and extrahepatic portal vein stenosis with  large volume ascites requiring twice weekly paracenteses in addition to pancreatic pseudocyst, non-compliance with low salt diet/fluid restriction, DM insulin dependent (HgA1c 7.1 on 5/23/19), BLE lymphedema, CHF, pAF on Eliquis and Metoprolol, CAD s/p PCI x5 on Plavix with subsequent CABG x3v (~2017 at [facility]; LIMA-LAD [patent], SVG-OM [patent], SVG-RCA[occluded; L->R collaterals from LAD to PDA]), HTN, HLD, BERHANE on CPAP, R knee OA, Charcot foot, normocytic anemia presenting for lap cholecystectomy.   The patient is s/p cholecystotomy tube removal (4/20). He complains of post prandial RUQ pain especially with fatty meals.  Stopped his Eliquis last Friday (4 days ago). No recent f/c, n/v since his last visit with Dr. Rowe.     ROS: negative unless stated otherwise in the HPI     Past Medical History:   Diagnosis Date    Alcohol abuse     Anasarca 1/28/2019    Anemia     Arthropathy associated with neurological disorder 9/2/2015    Atherosclerosis     Charcot foot due to diabetes mellitus     Chronic combined systolic and diastolic heart failure 01/29/2019 1-28-19 Left VentricleModerate decreased ejection fraction at 30%. Normal left ventricular cavity size. Normal wall thickness observed. Grade I (mild) left ventricular diastolic dysfunction consistent with impaired relaxation. Normal left atrial pressure. Moderate, global hypokinesis(see wall scoring diagram). Right VentricleNormal cavity size, wall thickness and ejection fraction. Wall motion n    Chronic pancreatitis 1/28/2019    CKD (chronic kidney disease) stage 3, GFR 30-59 ml/min     Colon polyps     approx 5 yrs ago    Coronary artery disease due to calcified coronary lesion 05/08/2015    5 stents on ASA      " Diabetic polyneuropathy associated with type 2 diabetes mellitus 9/2/2015    Diverticulosis 1/28/2019    DM type 2 with diabetic peripheral neuropathy 2/4/2019    Encounter for blood transfusion     Essential hypertension 1/28/2019    Former smoker 8/26/2015    Healed ulcer of left foot on examination 6/20/2017    Hematuria     Hydrocele     approx 1.5 yrs ago    Hyperphosphatemia     Hypoalbuminemia 2/4/2019    Hypocalcemia     Lymphedema of both lower extremities 1/29/2019    Mixed hyperlipidemia 5/8/2015    Morbid obesity with BMI of 50.0-59.9, adult 5/8/2015    Onychomycosis of multiple toenails with type 2 diabetes mellitus and peripheral neuropathy 6/20/2017    Perianal cyst     approx 2 yrs ago    Proteinuria     Pseudocyst of pancreas 1/28/2019 1-28-19 Liver has a cirrhotic morphology with no focal lesions.  Significant interval increase in ascites when compared to prior exam which may account for patient's abdominal distension.  Hypodense air-fluid collection along the body of the pancreas which is slightly smaller when compared to prior CT.  Findings may relate to pancreatic necrosis with pancreatic pseudocysts with infected pseudocyst    Skin cancer     skin cancer    Sleep apnea 8/26/2015    Status post bariatric surgery 1/11/2016    Type 2 diabetes mellitus, with long-term current use of insulin 5/8/2015       Past Surgical History:   Procedure Laterality Date    ANGIOGRAPHY N/A 6/28/2019    Procedure: ANGIOGRAM-PV STENT;  Surgeon: Ewa Diagnostic Provider;  Location: [facility] OR;  Service: Radiology;  Laterality: N/A;    ANGIOPLASTY      total x5 stents    COLONOSCOPY N/A 10/6/2015    Procedure: COLONOSCOPY;  Surgeon: Shekhar Richards MD;  Location: [facility] ([address] [place] [address]);  Service: Endoscopy;  Laterality: N/A;  BMI over 55/2nd floor case    5 day hold Plavix, Dr Kwadwo Arroyo    CORONARY ANGIOGRAPHY Right 3/20/2019    Procedure: ANGIOGRAM, CORONARY ARTERY;  Surgeon: Bob KHAN  MD Neto;  Location: [facility] CATH LAB;  Service: Cardiology;  Laterality: Right;    CORONARY ARTERY BYPASS GRAFT  2017    x3    CORONARY BYPASS GRAFT ANGIOGRAPHY  3/20/2019    Procedure: Bypass graft study;  Surgeon: Bob Duque MD;  Location: [facility] CATH LAB;  Service: Cardiology;;    CYST REMOVAL      ENDOSCOPIC ULTRASOUND OF UPPER GASTROINTESTINAL TRACT N/A 2/26/2020    Procedure: ULTRASOUND, UPPER GI TRACT, ENDOSCOPIC;  Surgeon: Robbie Yang MD;  Location: [facility] ENDO;  Service: Endoscopy;  Laterality: N/A;    ESOPHAGOGASTRODUODENOSCOPY N/A 7/8/2019    Procedure: ESOPHAGOGASTRODUODENOSCOPY (EGD);  Surgeon: Huan Brumfield MD;  Location: [facility] ENDO;  Service: Endoscopy;  Laterality: N/A;    GASTRECTOMY      INSERTION OF DIALYSIS CATHETER N/A 5/17/2019    Procedure: pleurx;  Surgeon: Ewa Diagnostic Provider;  Location: [facility] OR [address] [place] [address];  Service: General;  Laterality: N/A;  Room 188/[facility]    KNEE ARTHROSCOPY      perianal surgery      perianal cyst removed     No current facility-administered medications on file prior to encounter.      Current Outpatient Medications on File Prior to Encounter   Medication Sig Dispense Refill    ferrous sulfate (FEOSOL ORAL) Take 325 mg by mouth once daily.       furosemide (LASIX) 20 MG tablet Take 1 tablet (20 mg total) by mouth 2 (two) times daily. 60 tablet 11    insulin aspart U-100 (NOVOLOG) 100 unit/mL injection Inject 6 Units into the skin 3 (three) times daily before meals.       insulin detemir U-100 (LEVEMIR FLEXTOUCH) 100 unit/mL (3 mL) SubQ InPn pen Inject 20 Units into the skin every evening. (Patient taking differently: Inject 25 Units into the skin every evening. ) 3 mL 12    apixaban (ELIQUIS) 5 mg Tab Take 1 tablet (5 mg total) by mouth 2 (two) times daily. 60 tablet 11    blood sugar diagnostic Strp Test strips to use with meter covered by insurance to check blood sugars twice daily. Insulin dependent diabetic. 200 strip 11     "blood-glucose meter Misc Glucometer covered by insurance to check blood sugars at home.  Insulin dependent diabetic. 1 each 0    cyanocobalamin, vitamin B-12, 500 mcg Subl Place 1 tablet under the tongue every Monday.       glucagon, human recombinant, (GLUCAGON EMERGENCY KIT, HUMAN,) 1 mg SolR Inject 1 mg into the muscle as needed (For severely low sugar). 1 each 2    lancets Misc Lancets to use with meter covered by insurance to check blood sugars twice daily.  Insulin dependent diabetic. 200 each 11    metroNIDAZOLE (FLAGYL) 500 MG tablet Take 500 mg by mouth 3 (three) times daily.      pen needle, diabetic (INSUPEN) 32 gauge x 1/4" Ndle 1 each by Misc.(Non-Drug; Combo Route) route 4 (four) times daily. 360 each 3    sodium chloride 0.9% (NORMAL SALINE FLUSH) injection use as directed to flush drains daily 300 mL 4    sulfamethoxazole/trimethoprim (BACTRIM ORAL) Take by mouth.         Physical Exam:  Gen: No acute distress  Neuro: Alert and oriented to person and place  HEENT: NCAT, EOMI   CV: Regular rate, extremities well perfused   Resp: Normal WOB  Abd: Soft, Nondistended, Nontender  MSK: Moves all extremities    Results  Recent Labs   Lab 05/01/20  1145   WBC 4.72   HGB 10.2*   HCT 32.6*   *   *   CO2 18*   BUN 16   CREATININE 2.0*   CALCIUM 7.9*   ALKPHOS 180*   T bili 0.3  AST/ALT 71/78  Alb 3.0    Imaging:  Cholecystogram:  1. Patent cystic duct.  Well-formed cholecystostomy tube tract.  The tube was removed.  2. Unusual contour/filling defect along the inferior wall of the gallbladder lumen may represent stones, sludge or a Phrygian cap    Asessment/Plan  64 y.o. male with multiple comorbidities s/p cholecystotomy tube removal on 4/20 presenting for lap arya with multiple medical comorbidities places him at in request risk for postoperative complications:  Poorly controlled diabetes with blood sugars from 700 down to 60 with renal manifestations creatinine of 2.0  Hepatic " fibrosis/cirrhosis  Will institute renal protective measures with mannitol and IV fluids and manage blood sugars with insulin and glucose  History of noncompliance medically  Heart failure  Lower extremity lymphedema  Long-term anticoagulation      - proceed to the OR for lap arya  - written consents obtained  - has been NPO since midnight and stopped Eliquis 4 days ago.     LANCE Suarez MD  [facility] General Surgery PGY-2

## 2020-05-04 NOTE — OP NOTE
[facility]  Laparoscopic Cholecystectomy   Operative Note    SUMMARY     Date of Procedure: 5/4/2020     Procedure:  Laparoscopic cholecystectomy , liver biopsy    Provider: RANDELL Rowe MD    Assisting Provider: Kathia/Erick    Indications: This patient presents with symptomatic gallbladder disease and will undergo laparoscopic cholecystectomy.    Pre-Operative Diagnosis: Calculus of gallbladder with other cholecystitis, without mention of obstruction    Post-Operative Diagnosis: same plus Cirrhosis of liver without mention of alcohol    Anesthesia: [unfilled]    Technical Procedures Used: laparoscopic/amezcua     Description of the Findings of the Procedure:  Subacute cholecystitis.     The patient was seen again in the Holding Room. The risks, benefits, complications, treatment options, and expected outcomes were discussed with the patient. The possibilities of reaction to medication, pulmonary aspiration, perforation of viscus, bleeding, recurrent infection, finding a normal gallbladder, the need for additional procedures, failure to diagnose a condition, the possible need to convert to an open procedure, and creating a complication requiring transfusion or operation were discussed with the patient. The patient and/or family concurred with the proposed plan, giving informed consent. The site of surgery properly noted/marked. The patient was taken to Operating Room, identified as Jian Arrieta and the procedure verified as Laparoscopic Cholecystectomy with Intraoperative Cholangiograms. A Time Out was held and the above information confirmed.    Prior to the induction of general anesthesia, antibiotic prophylaxis was administered. General endotracheal anesthesia was then administered and tolerated well. After the induction, the abdomen was prepped in the usual sterile fashion. The patient was positioned in the supine position with the left arm comfortably tucked, along with some reverse  Trendelenburg.      Local anesthetic agent was injected into the skin near the umbilicus and an incision made. The midline fascia was incised and the Haroldo technique was used to introduce a 10 mm port under direct vision. It was secured with a figure of eight Vicryl suture placed in the usual fashion.   Pneumoperitoneum was then created with CO2 and tolerated well without any adverse changes in the patient's vital signs. Additional trocars were introduced under direct vision. All skin incisions were infiltrated with a local anesthetic agent before making the incision and placing the trocars.     The gallbladder was identified, the fundus grasped and retracted cephalad. Adhesions were lysed bluntly and with the electrocautery where indicated, taking care not to injure any adjacent organs or viscus. The infundibulum was grasped and retracted laterally, exposing the peritoneum overlying the triangle of Calot. This was then divided and exposed in a blunt fashion. The cystic duct was clearly identified and bluntly dissected circumferentially. The junctions of the gallbladder, cystic duct and common bile duct were clearly identified prior to the division of any linear structure and the critical view was obtained.     The patient had a preoperative percutaneous cholangiogram via cholecystostomy tube placed 6 weeks prior for acute cholecystitis, which showed no obstruction of the cystic duct or common duct but sludge in the gallbladder and possible gallbladder polyps.    The cystic duct was then doubly ligated with surgical clips and an Endoloop suture on the patient side and singly clipped on the gallbladder side and divided. The cystic artery was identified, dissected free, ligated with double clips and divided as well.     The gallbladder was dissected from the liver bed in retrograde fashion with the electrocautery. The gallbladder was removed. The liver bed was irrigated and inspected. Hemostasis was achieved with the  electrocautery. Copious irrigation was utilized and was repeatedly aspirated until clear all particulate matter.  The liver appeared somewhat cirrhotic and therefore a liver biopsy was obtained using the Abisai-Cut needle.  Two passes were made to cores were obtained.  The biopsy sites were cauterized with electrocautery and hemostasis was secure.  Pneumoperitoneum was completely reduced after viewing removal of the trocars under direct vision. The wound was thoroughly irrigated and the fascia was then closed with a figure of eight suture; the skin was then closed with Monocryl and a sterile dressing was applied.    Instrument, sponge, and needle counts were correct at closure and at the conclusion of the case.     Cholecystitis with Cholelithiasis             Complications: None; patient tolerated the procedure well.    Total IV Fluids 500  mL    Estimated Blood Loss (EBL): less than 50 mL     Drains: 0           Implants: 0    Specimens: Gallbladder, liver biopsy           Condition: stable    Disposition: PACU - hemodynamically stable.    Attestation: I was present and scrubbed for the entire procedure.

## 2020-05-04 NOTE — NURSING
Patient temp 94.8 per axilla, rechecked per rectum and is still 94.8. MD informed. No new orders given. Patient's room kept warm. Hot broth offered. Will continue to monitor.

## 2020-05-04 NOTE — TRANSFER OF CARE
"Anesthesia Transfer of Care Note    Patient: Jian Arrieta    Procedure(s) Performed: Procedure(s) (LRB):  CHOLECYSTECTOMY, LAPAROSCOPIC (N/A)  BIOPSY, LIVER, Laproscopic  (N/A)    Patient location: PACU    Anesthesia Type: general    Transport from OR: Transported from OR on 6-10 L/min O2 by face mask with adequate spontaneous ventilation    Post pain: adequate analgesia    Post assessment: no apparent anesthetic complications and tolerated procedure well    Post vital signs: stable    Level of consciousness: awake, alert and oriented    Nausea/Vomiting: no nausea/vomiting    Complications: none    Transfer of care protocol was followed      Last vitals:   Visit Vitals  BP (!) 155/74   Pulse 64   Resp 20   Ht 5' 7" (1.702 m)   Wt 105.5 kg (232 lb 9.4 oz)   SpO2 100%   BMI 36.43 kg/m²     "

## 2020-05-04 NOTE — PLAN OF CARE
Patient came into unit from surgery. Placed comfortable in bed. Started on IV fluids. Oriented to room set up.  Pt c/o being cold, temperature 95, extra blankets given. Bear hugger placed. Will continue to monitor.

## 2020-05-04 NOTE — INTERVAL H&P NOTE
The patient has been examined and the H&P has been reviewed:    I concur with the findings and no changes have occurred since H&P was written.   Poor compliance with IDDM    Anesthesia/Surgery risks, benefits and alternative options discussed and understood by patient/family.          Active Hospital Problems    Diagnosis  POA    Calculus of gallbladder with chronic cholecystitis without obstruction [K80.10]  Yes      Resolved Hospital Problems   No resolved problems to display.

## 2020-05-04 NOTE — ANESTHESIA POSTPROCEDURE EVALUATION
Anesthesia Post Evaluation    Patient: Jian Arrieta    Procedure(s) Performed: Procedure(s) (LRB):  CHOLECYSTECTOMY, LAPAROSCOPIC (N/A)  BIOPSY, LIVER, Laproscopic  (N/A)    Final Anesthesia Type: general    Patient location during evaluation: PACU  Patient participation: Yes- Able to Participate  Level of consciousness: awake and alert  Post-procedure vital signs: reviewed and stable  Pain management: adequate  Airway patency: patent    PONV status at discharge: No PONV  Anesthetic complications: no      Cardiovascular status: blood pressure returned to baseline  Respiratory status: unassisted and room air  Hydration status: euvolemic  Follow-up not needed.          Vitals Value Taken Time   /70 5/4/2020  1:18 PM   Temp 36.3 °C (97.4 °F) 5/4/2020 11:50 AM   Pulse 84 5/4/2020  1:19 PM   Resp 14 5/4/2020  1:19 PM   SpO2 99 % 5/4/2020  1:19 PM   Vitals shown include unvalidated device data.      No case tracking events are documented in the log.      Pain/Jason Score: Pain Rating Prior to Med Admin: 6 (5/4/2020 12:20 PM)  Jason Score: 9 (5/4/2020 12:05 PM)

## 2020-05-05 ENCOUNTER — TELEPHONE (OUTPATIENT)
Dept: INTERVENTIONAL RADIOLOGY/VASCULAR | Facility: HOSPITAL | Age: 65
End: 2020-05-05

## 2020-05-05 ENCOUNTER — TELEPHONE (OUTPATIENT)
Dept: NEUROLOGY | Facility: HOSPITAL | Age: 65
End: 2020-05-05

## 2020-05-05 VITALS
HEIGHT: 68 IN | SYSTOLIC BLOOD PRESSURE: 112 MMHG | DIASTOLIC BLOOD PRESSURE: 55 MMHG | TEMPERATURE: 98 F | BODY MASS INDEX: 37.86 KG/M2 | OXYGEN SATURATION: 99 % | RESPIRATION RATE: 20 BRPM | WEIGHT: 249.81 LBS | HEART RATE: 76 BPM

## 2020-05-05 LAB
ALBUMIN SERPL BCP-MCNC: 2.6 G/DL (ref 3.5–5.2)
ALP SERPL-CCNC: 122 U/L (ref 55–135)
ALT SERPL W/O P-5'-P-CCNC: 59 U/L (ref 10–44)
ANION GAP SERPL CALC-SCNC: 9 MMOL/L (ref 8–16)
AST SERPL-CCNC: 48 U/L (ref 10–40)
BASOPHILS # BLD AUTO: 0.02 K/UL (ref 0–0.2)
BASOPHILS NFR BLD: 0.2 % (ref 0–1.9)
BILIRUB SERPL-MCNC: 0.3 MG/DL (ref 0.1–1)
BUN SERPL-MCNC: 28 MG/DL (ref 8–23)
CALCIUM SERPL-MCNC: 7.9 MG/DL (ref 8.7–10.5)
CHLORIDE SERPL-SCNC: 109 MMOL/L (ref 95–110)
CO2 SERPL-SCNC: 17 MMOL/L (ref 23–29)
CREAT SERPL-MCNC: 1.8 MG/DL (ref 0.5–1.4)
DIFFERENTIAL METHOD: ABNORMAL
EOSINOPHIL # BLD AUTO: 0 K/UL (ref 0–0.5)
EOSINOPHIL NFR BLD: 0 % (ref 0–8)
ERYTHROCYTE [DISTWIDTH] IN BLOOD BY AUTOMATED COUNT: 15.2 % (ref 11.5–14.5)
EST. GFR  (AFRICAN AMERICAN): 45 ML/MIN/1.73 M^2
EST. GFR  (NON AFRICAN AMERICAN): 39 ML/MIN/1.73 M^2
GLUCOSE SERPL-MCNC: 324 MG/DL (ref 70–110)
HCT VFR BLD AUTO: 28.1 % (ref 40–54)
HGB BLD-MCNC: 8.8 G/DL (ref 14–18)
IMM GRANULOCYTES # BLD AUTO: 0.03 K/UL (ref 0–0.04)
IMM GRANULOCYTES NFR BLD AUTO: 0.3 % (ref 0–0.5)
LYMPHOCYTES # BLD AUTO: 1.1 K/UL (ref 1–4.8)
LYMPHOCYTES NFR BLD: 10.9 % (ref 18–48)
MAGNESIUM SERPL-MCNC: 1.8 MG/DL (ref 1.6–2.6)
MCH RBC QN AUTO: 30 PG (ref 27–31)
MCHC RBC AUTO-ENTMCNC: 31.3 G/DL (ref 32–36)
MCV RBC AUTO: 96 FL (ref 82–98)
MONOCYTES # BLD AUTO: 0.6 K/UL (ref 0.3–1)
MONOCYTES NFR BLD: 5.9 % (ref 4–15)
NEUTROPHILS # BLD AUTO: 8 K/UL (ref 1.8–7.7)
NEUTROPHILS NFR BLD: 82.7 % (ref 38–73)
NRBC BLD-RTO: 0 /100 WBC
PHOSPHATE SERPL-MCNC: 4.7 MG/DL (ref 2.7–4.5)
PLATELET # BLD AUTO: 116 K/UL (ref 150–350)
PMV BLD AUTO: 11.9 FL (ref 9.2–12.9)
POCT GLUCOSE: 305 MG/DL (ref 70–110)
POCT GLUCOSE: 312 MG/DL (ref 70–110)
POTASSIUM SERPL-SCNC: 5 MMOL/L (ref 3.5–5.1)
PROT SERPL-MCNC: 5.4 G/DL (ref 6–8.4)
RBC # BLD AUTO: 2.93 M/UL (ref 4.6–6.2)
SODIUM SERPL-SCNC: 135 MMOL/L (ref 136–145)
WBC # BLD AUTO: 9.62 K/UL (ref 3.9–12.7)

## 2020-05-05 PROCEDURE — 97530 THERAPEUTIC ACTIVITIES: CPT

## 2020-05-05 PROCEDURE — 25000003 PHARM REV CODE 250: Performed by: FAMILY MEDICINE

## 2020-05-05 PROCEDURE — C9399 UNCLASSIFIED DRUGS OR BIOLOG: HCPCS | Performed by: FAMILY MEDICINE

## 2020-05-05 PROCEDURE — 63600175 PHARM REV CODE 636 W HCPCS: Performed by: FAMILY MEDICINE

## 2020-05-05 PROCEDURE — 97110 THERAPEUTIC EXERCISES: CPT

## 2020-05-05 PROCEDURE — 80053 COMPREHEN METABOLIC PANEL: CPT

## 2020-05-05 PROCEDURE — 94761 N-INVAS EAR/PLS OXIMETRY MLT: CPT

## 2020-05-05 PROCEDURE — 97161 PT EVAL LOW COMPLEX 20 MIN: CPT

## 2020-05-05 PROCEDURE — 84100 ASSAY OF PHOSPHORUS: CPT

## 2020-05-05 PROCEDURE — 83735 ASSAY OF MAGNESIUM: CPT

## 2020-05-05 PROCEDURE — 63600175 PHARM REV CODE 636 W HCPCS: Performed by: STUDENT IN AN ORGANIZED HEALTH CARE EDUCATION/TRAINING PROGRAM

## 2020-05-05 PROCEDURE — 97165 OT EVAL LOW COMPLEX 30 MIN: CPT

## 2020-05-05 PROCEDURE — 36415 COLL VENOUS BLD VENIPUNCTURE: CPT

## 2020-05-05 PROCEDURE — S0030 INJECTION, METRONIDAZOLE: HCPCS | Performed by: FAMILY MEDICINE

## 2020-05-05 PROCEDURE — 85025 COMPLETE CBC W/AUTO DIFF WBC: CPT

## 2020-05-05 PROCEDURE — 94799 UNLISTED PULMONARY SVC/PX: CPT

## 2020-05-05 RX ORDER — GLUCAGON 1 MG
1 KIT INJECTION
Status: DISCONTINUED | OUTPATIENT
Start: 2020-05-05 | End: 2020-05-05 | Stop reason: HOSPADM

## 2020-05-05 RX ORDER — IBUPROFEN 200 MG
24 TABLET ORAL
Status: DISCONTINUED | OUTPATIENT
Start: 2020-05-05 | End: 2020-05-05 | Stop reason: HOSPADM

## 2020-05-05 RX ORDER — IBUPROFEN 200 MG
16 TABLET ORAL
Status: DISCONTINUED | OUTPATIENT
Start: 2020-05-05 | End: 2020-05-05 | Stop reason: HOSPADM

## 2020-05-05 RX ORDER — INSULIN ASPART 100 [IU]/ML
1-10 INJECTION, SOLUTION INTRAVENOUS; SUBCUTANEOUS
Status: DISCONTINUED | OUTPATIENT
Start: 2020-05-05 | End: 2020-05-05 | Stop reason: HOSPADM

## 2020-05-05 RX ORDER — TRAMADOL HYDROCHLORIDE 50 MG/1
50 TABLET ORAL EVERY 6 HOURS PRN
Qty: 20 TABLET | Refills: 0 | Status: SHIPPED | OUTPATIENT
Start: 2020-05-05 | End: 2021-03-04

## 2020-05-05 RX ADMIN — METRONIDAZOLE 500 MG: 500 INJECTION, SOLUTION INTRAVENOUS at 01:05

## 2020-05-05 RX ADMIN — INSULIN ASPART 2 UNITS: 100 INJECTION, SOLUTION INTRAVENOUS; SUBCUTANEOUS at 04:05

## 2020-05-05 RX ADMIN — INSULIN DETEMIR 25 UNITS: 100 INJECTION, SOLUTION SUBCUTANEOUS at 10:05

## 2020-05-05 RX ADMIN — INSULIN ASPART 6 UNITS: 100 INJECTION, SOLUTION INTRAVENOUS; SUBCUTANEOUS at 12:05

## 2020-05-05 RX ADMIN — METRONIDAZOLE 500 MG: 500 INJECTION, SOLUTION INTRAVENOUS at 10:05

## 2020-05-05 RX ADMIN — HYDROCODONE BITARTRATE AND ACETAMINOPHEN 1 TABLET: 5; 325 TABLET ORAL at 06:05

## 2020-05-05 RX ADMIN — CEFTRIAXONE 2 G: 2 INJECTION, SOLUTION INTRAVENOUS at 10:05

## 2020-05-05 RX ADMIN — INSULIN ASPART 2 UNITS: 100 INJECTION, SOLUTION INTRAVENOUS; SUBCUTANEOUS at 06:05

## 2020-05-05 RX ADMIN — INSULIN ASPART 2 UNITS: 100 INJECTION, SOLUTION INTRAVENOUS; SUBCUTANEOUS at 11:05

## 2020-05-05 RX ADMIN — ENOXAPARIN SODIUM 40 MG: 100 INJECTION SUBCUTANEOUS at 04:05

## 2020-05-05 RX ADMIN — FUROSEMIDE 20 MG: 20 TABLET ORAL at 10:05

## 2020-05-05 NOTE — PLAN OF CARE
VN cued in to pt's room. Plan of care reviewed with pt. Pt verbalizes understanding. Pt c/o pain to abd 6/10. Pain medicine regimen reviewed with pt and next available doses. Pt inquiring about lasix. Informed pt that md did order lasix for tonight. Pt requesting. Bedside nurse, damian, informed. Pt denies any further needs at this time. Call bell w/in reach. Instructed to call for needs/assist oob.

## 2020-05-05 NOTE — PLAN OF CARE
TN met with pt  For d/c planning. Pt is POD #1. Pt lives with wife who is help at home. Pt requires at home with ADLs. Pt has multiple DME at home (See DCA) . No HH noted. Pt 's wife to provide transportation at d/c. Pt states he has been complaint with taking his meds and going to his follow up appointments. Pt possibly interested in home health at d/c.       05/05/20 1108   Discharge Assessment   Assessment Type Discharge Planning Assessment   Confirmed/corrected address and phone number on facesheet? Yes   Assessment information obtained from? Patient   Expected Length of Stay (days) 2   Communicated expected length of stay with patient/caregiver yes   Prior to hospitilization cognitive status: Alert/Oriented   Prior to hospitalization functional status: Assistive Equipment   Current cognitive status: Alert/Oriented   Current Functional Status: Assistive Equipment   Lives With spouse   Able to Return to Prior Arrangements yes   Is patient able to care for self after discharge? Yes   Who are your caregiver(s) and their phone number(s)? Geri Arrieta (wife) [phone]   Patient's perception of discharge disposition home or selfcare   Readmission Within the Last 30 Days no previous admission in last 30 days   Patient currently being followed by outpatient case management? No   Patient currently receives any other outside agency services? No   Equipment Currently Used at Home walker, rolling;rollator;shower chair;bedside commode;power chair;hospital bed   Do you have any problems affording any of your prescribed medications? No   Is the patient taking medications as prescribed? yes   Does the patient have transportation home? Yes   Transportation Anticipated family or friend will provide   Does the patient receive services at the [facility]? No   Discharge Plan A Home with family   Discharge Plan B Home Health   DME Needed Upon Discharge  none   Patient/Family in Agreement with Plan yes

## 2020-05-05 NOTE — DISCHARGE SUMMARY
[facility]  General Surgery  Discharge Summary      Patient Name: Jian Arrieta  MRN: [id number]  Admission Date: 5/4/2020  Hospital Length of Stay: 0 days  Discharge Date and Time:  05/05/2020 3:11 PM  Attending Physician: SON Rowe MD   Discharging Provider: Nitin Suarez MD  Primary Care Provider: Leon Barajas DO    HPI: 63 y.o. male with PMH morbid obesity (BMI 41), stage 1 hepatic fibrosis (biopsy negative for cirrhosis), chronic pancreatitis and extrahepatic portal vein stenosis with  large volume ascites requiring twice weekly paracenteses in addition to pancreatic pseudocyst, non-compliance with low salt diet/fluid restriction, DM insulin dependent (HgA1c 7.1 on 5/23/19), BLE lymphedema, CHF, pAF on Eliquis and Metoprolol, CAD s/p PCI x5 on Plavix with subsequent CABG x3v (~2017 at [facility]; LIMA-LAD [patent], SVG-OM [patent], SVG-RCA[occluded; L->R collaterals from LAD to PDA]), HTN, HLD, BERHANE on CPAP, R knee OA, Charcot foot, normocytic anemia presenting for lap cholecystectomy.    Procedure(s) (LRB):  CHOLECYSTECTOMY, LAPAROSCOPIC (N/A)  BIOPSY, LIVER, Laproscopic  (N/A)      Indwelling Lines/Drains at time of discharge:   Lines/Drains/Airways     Central Venous Catheter Line            Percutaneous Central Line Insertion/Assessment - Triple Lumen  02/26/20 2319 right internal jugular 68 days          Drain                 Biliary Tube 02/27/20 1229 8 Fr. RUQ 68 days              Hospital Course: The patient underwent laparoscopic cholecystectomy without complications. He had one episode of emesis the evening following surgery but otherwise tolerated his diet. He had adequate pain control with oral medication and was given instructions to follow up with Dr. Rowe in clinic in one to two weeks.     Consults: None    Significant Diagnostic Studies: Labs:   CMP   Recent Labs   Lab 05/05/20  0455   *   K 5.0      CO2 17*   *   BUN 28*   CREATININE 1.8*    CALCIUM 7.9*   PROT 5.4*   ALBUMIN 2.6*   BILITOT 0.3   ALKPHOS 122   AST 48*   ALT 59*   ANIONGAP 9   ESTGFRAFRICA 45*   EGFRNONAA 39*     Bili 0.3    Pending Diagnostic Studies:     Procedure Component Value Units Date/Time    Specimen to Pathology, Surgery General Surgery [[id number]] Collected:  05/04/20 1120    Order Status:  Sent Lab Status:  In process Updated:  05/04/20 1259        Final Active Diagnoses:    Diagnosis Date Noted POA    PRINCIPAL PROBLEM:  Calculus of gallbladder with chronic cholecystitis without obstruction [K80.10] 05/04/2020 Yes    Other cirrhosis of liver [K74.69]  Yes    Diabetes mellitus due to underlying condition, uncontrolled, with renal complication [E08.29, E08.65]  Yes    Current use of long term anticoagulation [Z79.01] 03/10/2020 Not Applicable    Cholelithiasis with chronic cholecystitis [K80.10]  Yes    Medically noncompliant [Z91.19] 08/22/2019 Not Applicable    Chronic kidney disease, stage 3 [N18.3] 07/23/2019 Yes     Chronic    Obesity (BMI 30-39.9) [E66.9] 07/23/2019 Yes    Decreased mobility [R26.89] 06/11/2019 Yes    Chronic combined systolic and diastolic heart failure [I50.42] 01/29/2019 Yes    Lymphedema of both lower extremities [I89.0] 01/29/2019 Yes     Chronic    Anasarca [R60.1] 01/28/2019 Yes    Coronary artery disease due to calcified coronary lesion [I25.10, I25.84] 05/08/2015 Yes     Chronic      Problems Resolved During this Admission:      Discharged Condition: good    Disposition: Home or Self Care    Follow Up:  Follow-up Information     RANDELL Rowe MD. Go in 2 weeks.    Specialty:  General Surgery  Why:  's staff to contact you with a surgery follow up appointment.  Contact information:  [address]  [place] [address] [phone]             Leon Barajas DO.    Specialty:  Internal Medicine  Why:  As needed  Contact information:  [address] [facility]  [place]  [address]  [phone]                 Patient Instructions:      COVID-19 Routine Screening   Standing Status: Future Number of Occurrences: 1 Standing Exp. Date: 06/30/21     Order Specific Question Answer Comments   Is the patient symptomatic? No      Diet diabetic     No dressing needed     Activity as tolerated     Medications:  Reconciled Home Medications:      Medication List      CHANGE how you take these medications    insulin detemir U-100 100 unit/mL (3 mL) Inpn pen  Commonly known as:  LEVEMIR FLEXTOUCH  Inject 20 Units into the skin every evening.  What changed:  how much to take        CONTINUE taking these medications    apixaban 5 mg Tab  Commonly known as:  ELIQUIS  Take 1 tablet (5 mg total) by mouth 2 (two) times daily.  Start taking on:  May 6, 2020     BACTRIM ORAL  Take by mouth.     BD POSIFLUSH NORMAL SALINE 0.9 injection  Generic drug:  sodium chloride 0.9%  use as directed to flush drains daily     blood sugar diagnostic Strp  Test strips to use with meter covered by insurance to check blood sugars twice daily. Insulin dependent diabetic.     blood-glucose meter Misc  Glucometer covered by insurance to check blood sugars at home.  Insulin dependent diabetic.     cyanocobalamin (vitamin B-12) 500 mcg Subl  Place 1 tablet under the tongue every Monday.     FEOSOL ORAL  Take 325 mg by mouth once daily.     furosemide 20 MG tablet  Commonly known as:  LASIX  Take 1 tablet (20 mg total) by mouth 2 (two) times daily.     glucagon (human recombinant) 1 mg Solr  Commonly known as:  GLUCAGON EMERGENCY KIT (HUMAN)  Inject 1 mg into the muscle as needed (For severely low sugar).     insulin aspart U-100 100 unit/mL injection  Commonly known as:  NOVOLOG  Inject 6 Units into the skin 3 (three) times daily before meals.     lancets Misc  Lancets to use with meter covered by insurance to check blood sugars twice daily.  Insulin dependent diabetic.     metroNIDAZOLE 500 MG tablet  Commonly known as:   "FLAGYL  Take 500 mg by mouth 3 (three) times daily.     pen needle, diabetic 32 gauge x 1/4" Ndle  Commonly known as:  INSUPEN  1 each by Misc.(Non-Drug; Combo Route) route 4 (four) times daily.          Time spent on the discharge of patient: 30 minutes    Nitin Suarez MD  General Surgery  [facility]  "

## 2020-05-05 NOTE — PLAN OF CARE
Pt vitals were maintained, pt had some moderate pain. Pt vomit 2x times, both were brown/ reddish coffee ground. Changed pt diet to only ice chips. Pt hasn't voided yet, bladder scanned showed 285ml. MD was notified and 1L bolus was given. Might have to straight cath pt. No bowels heard yet post op.

## 2020-05-05 NOTE — PLAN OF CARE
Problem: Occupational Therapy Goal  Goal: Occupational Therapy Goal  Outcome: Met   Pt found in supine & agreeable to OT/PT co-eval/tx this AM. Pt lives w/ spouse in [place] w/ 3STE & BHR; WIS & t/s. PLOF: MI w/o amb DME w/ all fxnl tasks. Currently, pt w/o signif change in fxnl status from baseline & no further OT svcs indicated at this time. Edu/tx re: general safety techs, deep breathing techs & HEP. Pt verbalized understanding. Pt left UIC w/ alarm & nsg notified.     Per OT eval, pt w/o signif change in fxnl status from PLOF & no further OT svcs indicated at this time.

## 2020-05-05 NOTE — PLAN OF CARE
VN Rounds. VN called into patient's room for rounding and turned camera with permission. Patient sitting up in chair. He said he just got done eating the best chicken salad sandwhich ever. He said the doc just left and said he would be discharged today. Informed patient I would be on the look out for those DC orders. VN instructed to call for assistance. Call light within reach. Patient verbalized understanding. No acute distress noted. Pain denies pain at present. Allowed time for questions. Will continue to monitor chart and be available and intervene as needed.

## 2020-05-05 NOTE — PROGRESS NOTES
"Surgery Progress Note  05/05/2020    Admission Summary  In brief, Jian Arrieta is a 64 y.o. male admitted on 5/4/2020 for lap arya.     1 Day Post-Op    Interval HPI  Vomited x1 yesterday following surgery  Tolerated CLD overnight  Pain well controlled  Denies n/v this morning, f/c, CP, SOB  T low 94.8, normocardic and normotensive     Objective  VITAL SIGNS: 24 HR MIN & MAX LAST    Temp  Min: 94.8 °F (34.9 °C)  Max: 98.3 °F (36.8 °C)  97.4 °F (36.3 °C)        BP  Min: 103/57  Max: 158/79  (!) 106/59     Pulse  Min: 64  Max: 97  86     Resp  Min: 10  Max: 20  20    SpO2  Min: 97 %  Max: 100 %  98 %      HT: 5' 8" (172.7 cm)  WT: 113.3 kg (249 lb 12.5 oz)  BMI: 38     Physical Exam:  Gen: No acute distress  Neuro: Aox3  HEENT: NCAT, EOMI   CV: Regular rate, extremities well perfused   Resp: Normal WOB  Abd: Soft, nondistended, appropriately TTP  MSK: No pedal edema    Results  Recent Labs   Lab 05/01/20  1145 05/05/20  0455   WBC 4.72 9.62   HGB 10.2* 8.8*   HCT 32.6* 28.1*   * 116*   * 135*   CO2 18* 17*   BUN 16 28*   CREATININE 2.0* 1.8*   CALCIUM 7.9* 7.9*   MG  --  1.8   PHOS  --  4.7*   ALKPHOS 180* 122       Asessment/Plan  64 y.o. male with a history of chronic pancreatitis, PV stenosis, ascites, CHF, CAD s/p CABG, pAF on Eliquis, and recent cholecystotomy now s/p lap arya    -DM diet  -pain control PRN   -complete perioperative antibiotics today  -resume home medications, will discuss with staff when to resume Eliquis  -Physical therapy this AM  -promote IS, SCDs    Ppx: Kimberly Suarez MD  [facility] General Surgery PGY-2    "

## 2020-05-05 NOTE — NURSING
Spoke to Dr. Suarez about pts diet and sliding scale order.Pt is on diabetic diet and sliding scale insulin is ordered.

## 2020-05-05 NOTE — PT/OT/SLP EVAL
Physical Therapy Evaluation/Treatment    Patient Name:  Jian Arrieta   MRN:  [id number]    Recommendations:     Discharge Recommendations:  home   Discharge Equipment Recommendations: none   Barriers to discharge: None    Assessment:     Jian Arrieta is a 64 y.o. male admitted with a medical diagnosis of Calculus of gallbladder with chronic cholecystitis without obstruction.  He presents with the following impairments/functional limitations:  impaired sensation, impaired endurance, impaired balance, impaired self care skills, pain, edema, gait instability(general weakness) Patient with mild general weakness, decreased endurance, recommend home with no anticipated needs.    Rehab Prognosis: Good; patient would benefit from acute skilled PT services to address these deficits and reach maximum level of function.    Recent Surgery: Procedure(s) (LRB):  CHOLECYSTECTOMY, LAPAROSCOPIC (N/A)  BIOPSY, LIVER, Laproscopic  (N/A) 1 Day Post-Op    Plan:     During this hospitalization, patient to be seen 5 x/week to address the identified rehab impairments via gait training, therapeutic activities, therapeutic exercises and progress toward the following goals:    · Plan of Care Expires:  05/06/20    Subjective     Chief Complaint: pain  Patient/Family Comments/goals: been in bed a couple of days-need the walker  Pain/Comfort:  · Pain Rating 1: (endodrsed mild abdominal pain but did not rate; perhaps 3-4 per sevilla baker)  · Pain Addressed 1: Distraction, Reposition, Cessation of Activity  · Pain Rating Post-Intervention 1: (no complaints of pain)    Patients cultural, spiritual, Amish conflicts given the current situation: no    Living Environment:  Pt lives with wife in 3 level home with 3 EULA  Prior to admission, patients level of function was Mary Carmen inside home primarily using furniture as touch points; uses rollator as needed.  Equipment used at home: walker, rolling, shower chair, bedside commode, power chair, commode,  hospital bed, rollator. Upon discharge, patient will have assistance from wife.    Objective:     Communicated with nurse prior to session.  Patient found with peripheral IV  upon PT entry to room.    General Precautions: Standard, fall   Orthopedic Precautions:N/A   Braces: N/A     Exams:  · Cognitive Exam:  Patient is WFL cognitively; follows all commands  · Gross Motor Coordination:  WFL  · Sensation: absent to lt touch B feet and diminished B legs; intact to deep pressure B feet  · Skin Integrity/Edema:      · -       Skin integrity: Visible skin intact and puncture wounds R abdomen 2/2 laparoscopy  · -       Edema: Moderate BLEs R>L  · RLE ROM: WFL  · RLE Strength: WFL grossly  · LLE ROM: WFL  · LLE Strength: WFL grossly    Functional Mobility:  · Bed Mobility:     · Rolling Right: modified independence and with use of side rail  · Scooting: independence and modified independence  · Supine to Sit: modified independence and with use of side rail  · Transfers:     · Sit to Stand:  modified independence and supervision with no AD and rolling walker  · Gait: Pt amb 150 ft with one brief standing rest with SBS using RW; fair step length and miracle; VCs for postural awareness  · Balance: sit ~normal; stand ~fair+ to good; amb ~fair+      Therapeutic Activities and Exercises:   Patient performed skills as described above; educated on role of PT/POC, pursed lip breathing and AROM ex to BLE 10 reps -APs, ankle circles, hip abd/add, hip rolls, marches, FAQ, GS while seated in chair; /60 post amb, HR 93 O2 sats 100% on RA; fatigue with amb.    AM-PAC 6 CLICK MOBILITY  Total Score:21     Patient left up in chair with all lines intact, call button in reach, chair alarm on and nurse notified.    GOALS:   Multidisciplinary Problems     Physical Therapy Goals        Problem: Physical Therapy Goal    Goal Priority Disciplines Outcome Goal Variances Interventions   Physical Therapy Goal     PT, PT/OT Ongoing, Progressing      Description:  Goals to be met by: 2020     Patient will increase functional independence with mobility by performin. Gait  x 200 feet with Modified [place] using Rolling Walker.   2. Lower extremity exercise program x10 reps with independence                      History:     Past Medical History:   Diagnosis Date    Alcohol abuse     Anasarca 2019    Anemia     Arthropathy associated with neurological disorder 2015    Atherosclerosis     Charcot foot due to diabetes mellitus     Chronic combined systolic and diastolic heart failure 2019 Left VentricleModerate decreased ejection fraction at 30%. Normal left ventricular cavity size. Normal wall thickness observed. Grade I (mild) left ventricular diastolic dysfunction consistent with impaired relaxation. Normal left atrial pressure. Moderate, global hypokinesis(see wall scoring diagram). Right VentricleNormal cavity size, wall thickness and ejection fraction. Wall motion n    Chronic pancreatitis 2019    CKD (chronic kidney disease) stage 3, GFR 30-59 ml/min     Colon polyps     approx 5 yrs ago    Coronary artery disease due to calcified coronary lesion 2015    5 stents on ASA      Diabetic polyneuropathy associated with type 2 diabetes mellitus 2015    Diverticulosis 2019    DM type 2 with diabetic peripheral neuropathy 2019    Encounter for blood transfusion     Essential hypertension 2019    Former smoker 2015    Healed ulcer of left foot on examination 2017    Hematuria     Hydrocele     approx 1.5 yrs ago    Hyperphosphatemia     Hypoalbuminemia 2019    Hypocalcemia     Lymphedema of both lower extremities 2019    Mixed hyperlipidemia 2015    Morbid obesity with BMI of 50.0-59.9, adult 2015    Onychomycosis of multiple toenails with type 2 diabetes mellitus and peripheral neuropathy 2017    Perianal cyst     approx 2 yrs ago     Proteinuria     Pseudocyst of pancreas 1/28/2019 1-28-19 Liver has a cirrhotic morphology with no focal lesions.  Significant interval increase in ascites when compared to prior exam which may account for patient's abdominal distension.  Hypodense air-fluid collection along the body of the pancreas which is slightly smaller when compared to prior CT.  Findings may relate to pancreatic necrosis with pancreatic pseudocysts with infected pseudocyst    Skin cancer     skin cancer    Sleep apnea 8/26/2015    Status post bariatric surgery 1/11/2016    Type 2 diabetes mellitus, with long-term current use of insulin 5/8/2015       Past Surgical History:   Procedure Laterality Date    ANGIOGRAPHY N/A 6/28/2019    Procedure: ANGIOGRAM-PV STENT;  Surgeon: Ewa Diagnostic Provider;  Location: [facility] OR;  Service: Radiology;  Laterality: N/A;    ANGIOPLASTY      total x5 stents    COLONOSCOPY N/A 10/6/2015    Procedure: COLONOSCOPY;  Surgeon: Shekhar Richards MD;  Location: [facility] (2ND FLR);  Service: Endoscopy;  Laterality: N/A;  BMI over 55/2nd floor case    5 day hold Plavix, Dr Kwadwo Arroyo    CORONARY ANGIOGRAPHY Right 3/20/2019    Procedure: ANGIOGRAM, CORONARY ARTERY;  Surgeon: Bob Duque MD;  Location: [facility] CATH LAB;  Service: Cardiology;  Laterality: Right;    CORONARY ARTERY BYPASS GRAFT  2017    x3    CORONARY BYPASS GRAFT ANGIOGRAPHY  3/20/2019    Procedure: Bypass graft study;  Surgeon: Bob Duque MD;  Location: [facility] CATH LAB;  Service: Cardiology;;    CYST REMOVAL      ENDOSCOPIC ULTRASOUND OF UPPER GASTROINTESTINAL TRACT N/A 2/26/2020    Procedure: ULTRASOUND, UPPER GI TRACT, ENDOSCOPIC;  Surgeon: Robbie Yang MD;  Location: [facility] ENDO;  Service: Endoscopy;  Laterality: N/A;    ESOPHAGOGASTRODUODENOSCOPY N/A 7/8/2019    Procedure: ESOPHAGOGASTRODUODENOSCOPY (EGD);  Surgeon: Huan Brumfield MD;  Location: [facility] ENDO;  Service: Endoscopy;  Laterality: N/A;    GASTRECTOMY       INSERTION OF DIALYSIS CATHETER N/A 5/17/2019    Procedure: pleurx;  Surgeon: Ewa Diagnostic Provider;  [facility];  Service: General;  Laterality: N/A;  Room 188/[place]    KNEE ARTHROSCOPY      LAPAROSCOPIC CHOLECYSTECTOMY N/A 5/4/2020    Procedure: CHOLECYSTECTOMY, LAPAROSCOPIC;  Surgeon: SON Rowe MD;  Location: [facility];  Service: General;  Laterality: N/A;    LIVER BIOPSY N/A 5/4/2020    Procedure: BIOPSY, LIVER, Laproscopic ;  Surgeon: SON Rowe MD;  Location: [facility];  Service: General;  Laterality: N/A;    perianal surgery      perianal cyst removed       Time Tracking:     PT Received On: 05/05/20  PT Start Time: 0845     PT Stop Time: 0933  PT Total Time (min): 48 min with OT    Billable Minutes: Evaluation 10 minutes  and Therapeutic exercise  15 minutes      Prudence Ordaz, PT  05/05/2020

## 2020-05-05 NOTE — PLAN OF CARE
Problem: Physical Therapy Goal  Goal: Physical Therapy Goal  Description  Goals to be met by: 2020     Patient will increase functional independence with mobility by performin. Gait  x 200 feet with Modified [place] using Rolling Walker.   2. Lower extremity exercise program x10 reps with independence     Outcome: Ongoing, Progressing   Patient evaluated; full report to follow; pt amb 150ft with SBA; no complications; recommend home with no anticipated needs; has all needed equipment.

## 2020-05-05 NOTE — PLAN OF CARE
Discharge order noted, No HH or DME noted. Per PT/OT note, no HH or DME recommended.    Discharge rounds on patient. Discussed followup appointments, blue discharge folder, discharge nurse will go over home medications and reasons for medications and final discharge instructions. All patient/caregiver questions answered. Patient verbalized understanding.     Follow-up With  Details  Why  Contact Info   SON Rowe MD  Go in 2 weeks  's staff to contact you with a surgery follow up appointment.  [address]  [address]  [place] [address]  [phone]   Leon Barajas, DO    As needed  [address] [place] [address]  [phone]              05/05/20 1405   Final Note   Assessment Type Final Discharge Note   Anticipated Discharge Disposition Home   What phone number can be called within the next 1-3 days to see how you are doing after discharge? [phone]   Hospital Follow Up  Appt(s) scheduled? Yes  ('s staff with contact patient with a follow up appointment)   Discharge plans and expectations educations in teach back method with documentation complete? Yes

## 2020-05-06 ENCOUNTER — OFFICE VISIT (OUTPATIENT)
Dept: INTERNAL MEDICINE | Facility: CLINIC | Age: 65
End: 2020-05-06
Payer: MEDICARE

## 2020-05-06 VITALS
RESPIRATION RATE: 18 BRPM | TEMPERATURE: 98 F | HEIGHT: 67 IN | WEIGHT: 233.94 LBS | HEART RATE: 70 BPM | DIASTOLIC BLOOD PRESSURE: 76 MMHG | SYSTOLIC BLOOD PRESSURE: 124 MMHG | BODY MASS INDEX: 36.72 KG/M2

## 2020-05-06 DIAGNOSIS — K76.6 PORTAL HYPERTENSION DUE TO OBSTRUCTION OF EXTRAHEPATIC PORTAL VEIN: Chronic | ICD-10-CM

## 2020-05-06 DIAGNOSIS — I48.0 PAROXYSMAL ATRIAL FIBRILLATION: ICD-10-CM

## 2020-05-06 DIAGNOSIS — I50.42 CHRONIC COMBINED SYSTOLIC AND DIASTOLIC HEART FAILURE: ICD-10-CM

## 2020-05-06 DIAGNOSIS — E66.2 OBESITY HYPOVENTILATION SYNDROME: Chronic | ICD-10-CM

## 2020-05-06 DIAGNOSIS — E66.01 SEVERE OBESITY (BMI 35.0-35.9 WITH COMORBIDITY): ICD-10-CM

## 2020-05-06 DIAGNOSIS — I25.10 CORONARY ARTERY DISEASE DUE TO CALCIFIED CORONARY LESION: Chronic | ICD-10-CM

## 2020-05-06 DIAGNOSIS — E11.59 HYPERTENSION ASSOCIATED WITH DIABETES: ICD-10-CM

## 2020-05-06 DIAGNOSIS — K81.0 ACUTE CHOLECYSTITIS: Primary | ICD-10-CM

## 2020-05-06 DIAGNOSIS — E11.22 TYPE 2 DIABETES MELLITUS WITH STAGE 3 CHRONIC KIDNEY DISEASE, WITH LONG-TERM CURRENT USE OF INSULIN: ICD-10-CM

## 2020-05-06 DIAGNOSIS — I70.0 ATHEROSCLEROSIS OF AORTA: ICD-10-CM

## 2020-05-06 DIAGNOSIS — I25.84 CORONARY ARTERY DISEASE DUE TO CALCIFIED CORONARY LESION: Chronic | ICD-10-CM

## 2020-05-06 DIAGNOSIS — Z79.4 TYPE 2 DIABETES MELLITUS WITH STAGE 3 CHRONIC KIDNEY DISEASE, WITH LONG-TERM CURRENT USE OF INSULIN: ICD-10-CM

## 2020-05-06 DIAGNOSIS — R18.8 OTHER ASCITES: Chronic | ICD-10-CM

## 2020-05-06 DIAGNOSIS — I81 PORTAL HYPERTENSION DUE TO OBSTRUCTION OF EXTRAHEPATIC PORTAL VEIN: Chronic | ICD-10-CM

## 2020-05-06 DIAGNOSIS — E44.0 MALNUTRITION OF MODERATE DEGREE: ICD-10-CM

## 2020-05-06 DIAGNOSIS — N18.30 TYPE 2 DIABETES MELLITUS WITH STAGE 3 CHRONIC KIDNEY DISEASE, WITH LONG-TERM CURRENT USE OF INSULIN: ICD-10-CM

## 2020-05-06 DIAGNOSIS — I15.2 HYPERTENSION ASSOCIATED WITH DIABETES: ICD-10-CM

## 2020-05-06 DIAGNOSIS — N18.30 CHRONIC KIDNEY DISEASE, STAGE 3: Chronic | ICD-10-CM

## 2020-05-06 DIAGNOSIS — D64.9 ANEMIA, UNSPECIFIED TYPE: ICD-10-CM

## 2020-05-06 DIAGNOSIS — I70.90 ATHEROSCLEROSIS: ICD-10-CM

## 2020-05-06 DIAGNOSIS — I89.0 LYMPHEDEMA OF BOTH LOWER EXTREMITIES: Chronic | ICD-10-CM

## 2020-05-06 DIAGNOSIS — E11.69 HYPERLIPIDEMIA ASSOCIATED WITH TYPE 2 DIABETES MELLITUS: ICD-10-CM

## 2020-05-06 DIAGNOSIS — E78.5 HYPERLIPIDEMIA ASSOCIATED WITH TYPE 2 DIABETES MELLITUS: ICD-10-CM

## 2020-05-06 PROBLEM — I83.029 VENOUS STASIS ULCER OF LEFT LOWER LEG WITH EDEMA OF LEFT LOWER LEG: Status: RESOLVED | Noted: 2019-06-28 | Resolved: 2020-05-06

## 2020-05-06 PROBLEM — T85.518A CHOLECYSTOSTOMY TUBE DYSFUNCTION: Status: RESOLVED | Noted: 2020-03-13 | Resolved: 2020-05-06

## 2020-05-06 PROBLEM — B96.20 BACTEREMIA DUE TO ESCHERICHIA COLI: Status: RESOLVED | Noted: 2020-02-26 | Resolved: 2020-05-06

## 2020-05-06 PROBLEM — R78.81 BACTEREMIA DUE TO ESCHERICHIA COLI: Status: RESOLVED | Noted: 2020-02-26 | Resolved: 2020-05-06

## 2020-05-06 PROBLEM — E66.9 OBESITY (BMI 30-39.9): Status: RESOLVED | Noted: 2019-07-23 | Resolved: 2020-05-06

## 2020-05-06 PROBLEM — R60.0 VENOUS STASIS ULCER OF LEFT LOWER LEG WITH EDEMA OF LEFT LOWER LEG: Status: RESOLVED | Noted: 2019-06-28 | Resolved: 2020-05-06

## 2020-05-06 PROBLEM — A41.51 SEPSIS DUE TO ESCHERICHIA COLI WITH ACUTE RENAL FAILURE, TUBULAR NECROSIS, AND SEPTIC SHOCK: Status: RESOLVED | Noted: 2020-03-10 | Resolved: 2020-05-06

## 2020-05-06 PROBLEM — N17.0 SEPSIS DUE TO ESCHERICHIA COLI WITH ACUTE RENAL FAILURE, TUBULAR NECROSIS, AND SEPTIC SHOCK: Status: RESOLVED | Noted: 2020-03-10 | Resolved: 2020-05-06

## 2020-05-06 PROBLEM — I83.892 VENOUS STASIS ULCER OF LEFT LOWER LEG WITH EDEMA OF LEFT LOWER LEG: Status: RESOLVED | Noted: 2019-06-28 | Resolved: 2020-05-06

## 2020-05-06 PROBLEM — K80.10 CALCULUS OF GALLBLADDER WITH CHRONIC CHOLECYSTITIS WITHOUT OBSTRUCTION: Status: RESOLVED | Noted: 2020-05-04 | Resolved: 2020-05-06

## 2020-05-06 PROBLEM — L97.929 VENOUS STASIS ULCER OF LEFT LOWER LEG WITH EDEMA OF LEFT LOWER LEG: Status: RESOLVED | Noted: 2019-06-28 | Resolved: 2020-05-06

## 2020-05-06 PROBLEM — N17.0 ACUTE RENAL FAILURE WITH TUBULAR NECROSIS: Status: RESOLVED | Noted: 2020-03-10 | Resolved: 2020-05-06

## 2020-05-06 PROBLEM — R65.21 SEPSIS DUE TO ESCHERICHIA COLI WITH ACUTE RENAL FAILURE, TUBULAR NECROSIS, AND SEPTIC SHOCK: Status: RESOLVED | Noted: 2020-03-10 | Resolved: 2020-05-06

## 2020-05-06 PROCEDURE — 3074F PR MOST RECENT SYSTOLIC BLOOD PRESSURE < 130 MM HG: ICD-10-PCS | Mod: CPTII,S$GLB,, | Performed by: INTERNAL MEDICINE

## 2020-05-06 PROCEDURE — 1101F PT FALLS ASSESS-DOCD LE1/YR: CPT | Mod: CPTII,S$GLB,, | Performed by: INTERNAL MEDICINE

## 2020-05-06 PROCEDURE — 3008F PR BODY MASS INDEX (BMI) DOCUMENTED: ICD-10-PCS | Mod: CPTII,S$GLB,, | Performed by: INTERNAL MEDICINE

## 2020-05-06 PROCEDURE — 3046F PR MOST RECENT HEMOGLOBIN A1C LEVEL > 9.0%: ICD-10-PCS | Mod: CPTII,S$GLB,, | Performed by: INTERNAL MEDICINE

## 2020-05-06 PROCEDURE — 3078F PR MOST RECENT DIASTOLIC BLOOD PRESSURE < 80 MM HG: ICD-10-PCS | Mod: CPTII,S$GLB,, | Performed by: INTERNAL MEDICINE

## 2020-05-06 PROCEDURE — 99215 OFFICE O/P EST HI 40 MIN: CPT | Mod: S$GLB,,, | Performed by: INTERNAL MEDICINE

## 2020-05-06 PROCEDURE — 99999 PR PBB SHADOW E&M-EST. PATIENT-LVL III: ICD-10-PCS | Mod: PBBFAC,,, | Performed by: INTERNAL MEDICINE

## 2020-05-06 PROCEDURE — 3046F HEMOGLOBIN A1C LEVEL >9.0%: CPT | Mod: CPTII,S$GLB,, | Performed by: INTERNAL MEDICINE

## 2020-05-06 PROCEDURE — 99999 PR PBB SHADOW E&M-EST. PATIENT-LVL III: CPT | Mod: PBBFAC,,, | Performed by: INTERNAL MEDICINE

## 2020-05-06 PROCEDURE — 1101F PR PT FALLS ASSESS DOC 0-1 FALLS W/OUT INJ PAST YR: ICD-10-PCS | Mod: CPTII,S$GLB,, | Performed by: INTERNAL MEDICINE

## 2020-05-06 PROCEDURE — 3078F DIAST BP <80 MM HG: CPT | Mod: CPTII,S$GLB,, | Performed by: INTERNAL MEDICINE

## 2020-05-06 PROCEDURE — 3008F BODY MASS INDEX DOCD: CPT | Mod: CPTII,S$GLB,, | Performed by: INTERNAL MEDICINE

## 2020-05-06 PROCEDURE — 99215 PR OFFICE/OUTPT VISIT, EST, LEVL V, 40-54 MIN: ICD-10-PCS | Mod: S$GLB,,, | Performed by: INTERNAL MEDICINE

## 2020-05-06 PROCEDURE — 3074F SYST BP LT 130 MM HG: CPT | Mod: CPTII,S$GLB,, | Performed by: INTERNAL MEDICINE

## 2020-05-06 NOTE — PROGRESS NOTES
Subjective:       Patient ID: Jian Arrieta is a 64 y.o. male.    Chief Complaint: Follow-up    HPI   Pt with T2DM with neuropathy, PAF, HTN, HLD, CKD IV, Atherosclerosis, Anemia, Morbid Obesity, Chronic LE lymphedema, BERHANE, Ascites/portal HTN, Chronic pancreatitis, Malnutrition, Aortic Atherosclerosis is here for hospital f/u from 5/4/20-5/5/20 for lap arya. Surgery went well without any complications. Since discharge pt is tolerating PO intake without any difficulty and he only has mild abdominal pain at incision sites. No fevers/chills, N/V.   Review of Systems   Constitutional: Negative for activity change, appetite change, chills, diaphoresis, fatigue, fever and unexpected weight change.   HENT: Negative for congestion, mouth sores, postnasal drip, rhinorrhea, sinus pressure, sneezing, sore throat, trouble swallowing and voice change.    Eyes: Negative for discharge, itching and visual disturbance.   Respiratory: Negative for cough, chest tightness, shortness of breath and wheezing.    Cardiovascular: Negative for chest pain, palpitations and leg swelling.   Gastrointestinal: Positive for abdominal pain. Negative for blood in stool, constipation, diarrhea, nausea and vomiting.   Endocrine: Negative for cold intolerance and heat intolerance.   Genitourinary: Negative for difficulty urinating, dysuria, flank pain, hematuria and urgency.   Musculoskeletal: Negative for arthralgias, back pain, myalgias and neck pain.   Skin: Negative for rash and wound.   Allergic/Immunologic: Negative for environmental allergies and food allergies.   Neurological: Negative for dizziness, tremors, seizures, syncope, weakness and headaches.   Hematological: Negative for adenopathy. Does not bruise/bleed easily.   Psychiatric/Behavioral: Negative for confusion, sleep disturbance and suicidal ideas. The patient is not nervous/anxious.        Objective:      Physical Exam   Constitutional: He is oriented to person, place, and time. He  appears well-developed and well-nourished. No distress.   HENT:   Head: Normocephalic and atraumatic.   Right Ear: External ear normal.   Left Ear: External ear normal.   Nose: Nose normal.   Mouth/Throat: Oropharynx is clear and moist. No oropharyngeal exudate.   Eyes: Pupils are equal, round, and reactive to light. Conjunctivae and EOM are normal. Right eye exhibits no discharge. Left eye exhibits no discharge. No scleral icterus.   Neck: Normal range of motion. Neck supple. No JVD present.   Cardiovascular: Normal rate, regular rhythm and normal heart sounds.   No murmur heard.  Pulmonary/Chest: Effort normal and breath sounds normal. No respiratory distress. He has no wheezes. He has no rales.   Abdominal: Soft. Bowel sounds are normal. He exhibits no distension. There is no tenderness.       Surgical sites healing well   Musculoskeletal: He exhibits no edema.   Lymphadenopathy:     He has no cervical adenopathy.   Neurological: He is alert and oriented to person, place, and time.   Skin: Skin is warm and dry. No rash noted. He is not diaphoretic. No pallor.       Assessment:       1. Acute cholecystitis    2. Portal hypertension due to obstruction of extrahepatic portal vein    3. Other ascites    4. Type 2 diabetes mellitus with stage 3 chronic kidney disease, with long-term current use of insulin    5. Paroxysmal atrial fibrillation    6. Coronary artery disease due to calcified coronary lesion    7. Hypertension associated with diabetes    8. Hyperlipidemia associated with type 2 diabetes mellitus    9. Chronic kidney disease, stage 3    10. Severe obesity (BMI 35.0-35.9 with comorbidity)    11. Lymphedema of both lower extremities    12. Anemia, unspecified type    13. Obesity hypoventilation syndrome    14. Atherosclerosis    15. Chronic combined systolic and diastolic heart failure    16. Malnutrition of moderate degree    17. Atherosclerosis of aorta        Plan:    Acute cholecystitis- resolved, s/p lab  arya     1. Ascites/portal HTN- significant improvement s/p venoplasty for portal vein occlusion        Followed by Hepatology    2. T2DM with neuropathy/CKD- last A1C of 9.4(4/20)<--7.1(5/19)<--7.4(3/19)<--6.9(1/19)       CPT, followed by Endocrine   3. PAF- stable, CPT   5. CAD- stable, CPT   6. HTN- stable, CPT   7. HLD- stable   8. CKD III- stable   9. Severe Obesity- pt advised on proper diet/exercise for weight loss  10. Chronic LE lymphedema with stasis dermatitis- stable  11. NC/NC Anemia- stable  12. BERHANE- pt not using his CPAP  13. Aortic atherosclerosis- stable  14. CHF- stable  15. Chronic pancreatitis- stable   16. Malnutrition- stable  17. Aortic atherosclerosis- stable     Over 1/2 of 40 minute visit spent reviewing pt's medical records, education/discussion of pt's medical conditions and medical management

## 2020-05-08 LAB
FINAL PATHOLOGIC DIAGNOSIS: NORMAL
GROSS: NORMAL
MICROSCOPIC EXAM: NORMAL

## 2020-05-11 PROBLEM — I70.0 ATHEROSCLEROSIS OF AORTA: Status: ACTIVE | Noted: 2020-05-11

## 2020-05-14 NOTE — PROGRESS NOTES
" [place]:  [facility]  A collaboration between [facility] and [facility]        Chief Complaint:  Blood sugars impossible to control.  Had hypoglycemic episode this morning weight to clinic    S/p:   5/4/2020 - Cholecystectomy, Laparoscopic and BIOPSY, LIVER, Laproscopic      Pathology:     PATHOLOGIC DIAGNOSIS  1. GALLBLADDER, CHOLECYSTECTOMY:  Chronic cholecystitis and fibrosis  No evidence of neoplasia  2. 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  Chronic renal insufficiency.  IDDM.  Inadvertently overdosed with Levemir this morning.  Having difficulty controlling blood sugars especially hypoglycemic episodes after being hyperglycemic.  Wants appointment with diabetic tolerated assist and continuous glucose monitor    Vital Sign:   Vitals:    05/15/20 1000   BP: 118/63   Pulse: 90   Temp: 96.9 °F (36.1 °C)   Weight: 107.9 kg (237 lb 12.3 oz)   Height: 5' 7" (1.702 m)     {  Incision: healing well.  Remainder of exam noncontributory except for 2 to 3+ lower extremity edema.  Neuroendocrine Labs Latest Ref Rng & Units 5/5/2020   WBC 3.90 - 12.70 K/uL 9.62   HGB 14.0 - 18.0 g/dL 8.8 (L)   HCT 40.0 - 54.0 % 28.1 (L)   PLATLETS 150 - 350 K/uL 116 (L)   PT 9.0 - 12.5 sec    PTT 21.0 - 32.0 sec    INR 0.8 - 1.2    GLUCOSE 70 - 110 mg/dL 324 (H)   BUN 8 - 23 mg/dL 28 (H)   CREATININE 0.5 - 1.4 mg/dL 1.8 (H)    - 145 mmol/L 135 (L)   K 3.5 - 5.1 mmol/L 5.0   CHLORIDE 95 - 110 mmol/L 109   CO2 23 - 29 mmol/L 17 (L)   CALCIUM 8.7 - 10.5 mg/dL 7.9 (L)   PROTEIN, TOTAL 6.0 - 8.4 g/dL 5.4 (L)   PHOSPHORUS " 2.7 - 4.5 mg/dL 4.7 (H)   ALBUMIN 3.5 - 5.2 g/dL 2.6 (L)   TOTAL BILIRUBIN 0.1 - 1.0 mg/dL 0.3   DIRECT BILIRUBIN 0.1 - 0.3 mg/dL    ALK PHOSPHATASE 55 - 135 U/L 122   GGT 8 - 55 U/L    SGOT (AST) 10 - 40 U/L 48 (H)   SGPT (ALT) 10 - 44 U/L 59 (H)     Neuroendocrine Labs Latest Ref Rng & Units 4/20/2020   HEMOGLOBIN A1C 4.0 - 5.6 % 9.4 (H)     Long discussion with patient about diabetes caloric intake carbohydrate intake exercise and weight management. 30 min counseling    Impression:  IDDM not controlled hemoglobin A1c 9.4.  Needs to keep it clean blood glucose and insulin diary and food diary.    Appetite: good    Restrictions: NO Restrictions    Return to clinic: as needed   Blood sugar insulin and food diary daily to give to diabetic nurse when he sees  Prescription for Bumex 1 mg q.a.m. 0.5 mg q.p.m. and stop Lasix.  Sodium restricted diet 2 g sodium daily  Follow-up with Nephrology  Follow-up with diabetic /MD/nurse   Suggests diabetes education

## 2020-05-15 ENCOUNTER — OFFICE VISIT (OUTPATIENT)
Dept: NEUROLOGY | Facility: HOSPITAL | Age: 65
End: 2020-05-15
Attending: SURGERY
Payer: MEDICARE

## 2020-05-15 VITALS
TEMPERATURE: 97 F | WEIGHT: 237.75 LBS | SYSTOLIC BLOOD PRESSURE: 118 MMHG | BODY MASS INDEX: 37.31 KG/M2 | HEIGHT: 67 IN | DIASTOLIC BLOOD PRESSURE: 63 MMHG | HEART RATE: 90 BPM

## 2020-05-15 DIAGNOSIS — K76.6 PORTAL HYPERTENSION DUE TO OBSTRUCTION OF EXTRAHEPATIC PORTAL VEIN: Chronic | ICD-10-CM

## 2020-05-15 DIAGNOSIS — E11.22 TYPE 2 DIABETES MELLITUS WITH STAGE 3 CHRONIC KIDNEY DISEASE, WITH LONG-TERM CURRENT USE OF INSULIN: Primary | ICD-10-CM

## 2020-05-15 DIAGNOSIS — Z79.4 TYPE 2 DIABETES MELLITUS WITH STAGE 3 CHRONIC KIDNEY DISEASE, WITH LONG-TERM CURRENT USE OF INSULIN: Primary | ICD-10-CM

## 2020-05-15 DIAGNOSIS — I81 PORTAL HYPERTENSION DUE TO OBSTRUCTION OF EXTRAHEPATIC PORTAL VEIN: Chronic | ICD-10-CM

## 2020-05-15 DIAGNOSIS — N18.30 TYPE 2 DIABETES MELLITUS WITH STAGE 3 CHRONIC KIDNEY DISEASE, WITH LONG-TERM CURRENT USE OF INSULIN: Primary | ICD-10-CM

## 2020-05-15 DIAGNOSIS — K74.00 HEPATIC FIBROSIS: ICD-10-CM

## 2020-05-15 DIAGNOSIS — K74.69 OTHER CIRRHOSIS OF LIVER: ICD-10-CM

## 2020-05-15 DIAGNOSIS — I89.0 LYMPHEDEMA OF BOTH LOWER EXTREMITIES: Chronic | ICD-10-CM

## 2020-05-15 PROCEDURE — 99213 OFFICE O/P EST LOW 20 MIN: CPT | Performed by: SURGERY

## 2020-05-15 RX ORDER — BUMETANIDE 1 MG/1
TABLET ORAL
Qty: 45 TABLET | Refills: 11 | Status: SHIPPED | OUTPATIENT
Start: 2020-05-15 | End: 2022-08-01

## 2020-05-15 NOTE — PATIENT INSTRUCTIONS
Return to clinic as needed  Bumex sent to [facility]      Managing Type 2 Diabetes    Type 2 diabetes is a long-term (chronic) condition. Managing your diabetes means making some changes that may be hard. Your healthcare provider, nurse, diabetes educator, and others can help you.  Managing type 2 diabetes means balancing your medicine with diet and activity. Managing your diabetes may also include checking your blood sugar. And, working with your healthcare provider to prevent complications.  Take your medicine as prescribed  You may take pills (oral medicine) or give yourself shots (usually insulin injections) for diabetes. Or you may use both. Taking your medicines or giving yourself insulin at the right times will help you control your blood sugar. Think about ways that will help you remember to take your medicines the right way every day. Ask your healthcare provider, nurse, or diabetes educator for ideas.  Although you may only take pills for your diabetes, this may change. Over time, most people with type 2 diabetes also use insulin.  Eat healthy  A healthy, well-planned diet helps control the amount of sugar in your blood. It also helps you stay at a healthy weight or lose weight, if you are overweight. Extra weight makes it harder to control diabetes.  Your healthcare provider, nurse, a dietitian, or diabetes educator will help you create a plan that works for you. You don't have to give up all the foods you like. Having meals and snacks with vegetables, fruits, lean meats, or other healthy proteins, whole grains, and low-fat or nonfat dairy products will help control your blood sugar.  Be physically active  Being active helps lower your blood sugar. It does this by helping your body use insulin to turn food into energy. Activity also helps you manage your weight:  Ask your health care provider to work with you to create an activity program that's right for you. Your activity program is based on your age,  general health, and types of activity you enjoy. You should start slowly, but aim for at least 150 minutes of exercise or activity. Dont let more than 2 days go by without being active.  Check your blood sugar  Checking your own blood sugar may be a regular part of your care. Or you may only check your blood sugar from time to time. Your healthcare provider will give you instructions about checking your blood sugar at home. Checking it tells you if your blood sugar is in your target range. Having your blood sugars within the target range means that you are managing your diabetes well.  If your blood sugar levels are too high or too low, your healthcare provider may suggest changes to your diet or activity level. He or she may also adjust your medicine.  Your healthcare provider may also tell you to check your blood sugar more often when you are sick. At certain times, for example, when you have a cold or the flu, you may need to check it more often.  Take care of yourself  When you have diabetes, you may be more likely to develop other health problems. They include foot, eye, heart, and kidney problems. By controlling your blood sugar, and taking good care of yourself, you can help prevent these problems. Your health care provider, nurse, diabetes educator, and others can assist you:  · Checkups. You should have regular checkups with your healthcare provider. At those visits, you will have a physical exam that includes checking your feet. Your healthcare provider will also check your blood pressure and weight.  · Other exams. You should also have complete eye, foot, and dental exams at least once every year.  · Lab tests. You will have blood and urine tests:  ¨ At least two times a year, your healthcare provider will check your hemoglobin A1C. This blood test shows how well you have been controlling your blood sugar over 2 to 3 months. The results help your healthcare provider manage your diabetes.    ¨ You will  also have other lab tests. For example, to check for kidney problems and abnormal cholesterol levels.  · Smoking. If you smoke, you will need to quit. Smoking increases the chance that you will develop complications from diabetes. Ask your healthcare provider about ways to quit.  · Vaccines. Get a yearly flu shot. And, ask your healthcare provider about vaccines to prevent pneumonia, shingles, and hepatitis B.  Stress and depression  Most people have challenges throughout their lives. Living with diabetes, or any serious condition, can increase your stress and make you feel a lot of different emotions. In diabetes, feeling stressed or depressed can actually affect your blood sugar levels.  If you are having trouble dealing with diabetes, tell your healthcare provider. He or she can help or refer you to other healthcare providers or programs.  Support and resources  Know where you can get help. You can try the following:  · Support. Ask family and friends to support your effects to take care of yourself. Or, look for a diabetes support group locally or on the internet. (Check the Connect with Others link on www.diabetes.org)  · Counseling. Talk with a , psychologist, psychiatrist, or other counselor.  · Information. Contact the American Diabetes Association at 101-467-5234 or www.diabetes.org  Date Last Reviewed: 6/1/2016  © 3878-0666 [employer]. [address] [place] [address], [place] [address]. All rights reserved. This information is not intended as a substitute for professional medical care. Always follow your healthcare professional's instructions.

## 2020-05-19 NOTE — PROGRESS NOTES
Diabetes Care Specialist Telehealth Visit Note     It was in the patient's best interest to receive diabetes self-management education and support services in this format to prevent the exposure to COVID-19.        Established Patient - Audio Only Telehealth Visit  The patient location is: home   The chief complaint leading to consultation is: Diabetes    Visit type: Virtual visit with audio only (telephone)  Total time spent with patient: 20 min      The reason for the audio only service rather than synchronous audio and video virtual visit was related to technical difficulties or patient preference/necessity.     Each patient to whom I provide medical services by telemedicine is:  (1) informed of the relationship between the physician and patient and the respective role of any other health care provider with respect to management of the patient; and (2) notified that they may decline to receive medical services by telemedicine and may withdraw from such care at any time. Patient verbally consented to receive this service via voice-only telephone call.              Diabetes Education  Author: Melissa Silver RD, CDE  Date: 3/20/2020    Diabetes Care Management Summary  Diabetes Education Record Progress: Comprehensive  (2 mo f/u)    Current Diabetes Risk Level: Moderate     Last A1c:   Lab Results   Component Value Date    HGBA1C 9.4 (H) 04/20/2020     Last visit with Diabetes Educator: 1/15/2020    Diabetes Type : Type II  Diabetes Diagnosis: >10 years        Current Treatment: Insulin  Levemir 25 units every morning;   Novolog 6-10 units AC (only takes at breakfast despite eating 3 meals daily)               Reviewed Problem List with Patient: Yes    Health Maintenance was reviewed today with patient. Discussed with patient importance of routine eye exams, foot exams/foot care, blood work (i.e.: A1c, microalbumin, and lipid), dental visits, yearly flu vaccine, and pneumonia vaccine as indicated by PCP. Patient  verbalized understanding.     Health Maintenance Topics with due status: Not Due       Topic Last Completion Date    TETANUS VACCINE 03/13/2017    Colonoscopy 05/23/2018    Foot Exam 11/15/2019    Eye Exam 11/22/2019    Lipid Panel 02/21/2020    Hemoglobin A1c 04/20/2020     Health Maintenance Due   Topic Date Due    Pneumococcal Vaccine (65+ High/Highest Risk) (1 of 2 - PCV13) 08/19/2019       Nutrition  Meal Planning: 3 meals per day, eats out often     Monitoring   Monitoring   Self Monitoring : (SMBG 1-2 times daily; 174, 143)  In the last month, how often have you had a low blood sugar reaction?: once  (79 mg/dL)     Exercise   Exercise Type: none     Social History  Preferred Learning Method: Face to Face, Demonstration, Hands On  Primary Support: Self, Spouse  Smoking Status: Ex Smoker  Barriers to Change: None  Learning Challenges : None  Readiness to Learn : Acceptance  Cultural Influences: No      Diabetes Education Assessment/Progress  Diabetes Disease Process (diabetes disease process and treatment options): Discussion, Instructed, Individual Session, Written Materials Provided, Comprehends Key Points  Nutrition (Incorporating nutritional management into one's lifestyle): Discussion, Instructed, Individual Session, Written Materials Provided, Comprehends Key Points  Physical Activity (incorporating physical activity into one's lifestyle): Discussion, Instructed, Individual Session, Written Materials Provided, Comprehends Key Points  Medications (states correct name, dose, onset, peak, duration, side effects & timing of meds): Discussion, Instructed, Individual Session, Written Materials Provided, Comprehends Key Points  Monitoring (monitoring blood glucose/other parameters & using results): Discussion, Instructed, Individual Session, Written Materials Provided, Comprehends Key Points  Acute Complications (preventing, detecting, and treating acute complications): Discussion, Instructed, Individual  Session, Written Materials Provided, Comprehends Key Points  Chronic Complications (preventing, detecting, and treating chronic complications): Discussion, Instructed, Individual Session, Written Materials Provided, Comprehends Key Points  Clinical (diabetes, other pertinent medical history, and relevant comorbidities reviewed during visit): Discussion, Individual Session  Cognitive (knowledge of self-management skills, functional health literacy): Discussion, Individual Session  Psychosocial (emotional response to diabetes): Discussion, Individual Session(DC;IS;)  Diabetes Distress and Support Systems: Discussion, Individual Session  Behavioral (readiness for change, lifestyle practices, self-care behaviors): Discussion, Individual Session            Goals  Patient has selected/evaluated goals during today's session: No         Diabetes Care Plan/Intervention  Education Plan/Intervention: Individual Follow-Up DSMT  (6 week f/u scheduled for 3/2/20)        Diabetes Meal Plan  Restrictions: Restricted Carbohydrate, Low Fat, Low Sodium        Today's Self-Management Care Plan was developed with the patient's input and is based on barriers identified during today's assessment.    The long and short-term goals in the care plan were written with the patient/caregiver's input. The patient has agreed to work toward these goals to improve his overall diabetes control.      The patient received a copy of today's self-management plan and verbalized understanding of the care plan, goals, and all of today's instructions.      The patient was encouraged to communicate with his physician and care team regarding his condition(s) and treatment.  I provided the patient with my contact information today and encouraged him to contact me via phone or patient portal as needed.         Education Units of Time   Time Spent: 30 min

## 2020-05-22 ENCOUNTER — LAB VISIT (OUTPATIENT)
Dept: LAB | Facility: HOSPITAL | Age: 65
End: 2020-05-22
Attending: INTERNAL MEDICINE
Payer: MEDICARE

## 2020-05-22 DIAGNOSIS — N18.4 CKD (CHRONIC KIDNEY DISEASE), STAGE IV: ICD-10-CM

## 2020-05-22 LAB
ALBUMIN SERPL BCP-MCNC: 3.1 G/DL (ref 3.5–5.2)
ANION GAP SERPL CALC-SCNC: 5 MMOL/L (ref 8–16)
BUN SERPL-MCNC: 21 MG/DL (ref 8–23)
CALCIUM SERPL-MCNC: 8.7 MG/DL (ref 8.7–10.5)
CHLORIDE SERPL-SCNC: 113 MMOL/L (ref 95–110)
CO2 SERPL-SCNC: 24 MMOL/L (ref 23–29)
CREAT SERPL-MCNC: 1.9 MG/DL (ref 0.5–1.4)
EST. GFR  (AFRICAN AMERICAN): 42 ML/MIN/1.73 M^2
EST. GFR  (NON AFRICAN AMERICAN): 36 ML/MIN/1.73 M^2
GLUCOSE SERPL-MCNC: 60 MG/DL (ref 70–110)
MAGNESIUM SERPL-MCNC: 1.9 MG/DL (ref 1.6–2.6)
PHOSPHATE SERPL-MCNC: 4.6 MG/DL (ref 2.7–4.5)
POTASSIUM SERPL-SCNC: 4.3 MMOL/L (ref 3.5–5.1)
SODIUM SERPL-SCNC: 142 MMOL/L (ref 136–145)

## 2020-05-22 PROCEDURE — 80048 BASIC METABOLIC PNL TOTAL CA: CPT

## 2020-05-22 PROCEDURE — 86160 COMPLEMENT ANTIGEN: CPT

## 2020-05-22 PROCEDURE — 86160 COMPLEMENT ANTIGEN: CPT | Mod: 59

## 2020-05-22 PROCEDURE — 82040 ASSAY OF SERUM ALBUMIN: CPT

## 2020-05-22 PROCEDURE — 36415 COLL VENOUS BLD VENIPUNCTURE: CPT

## 2020-05-22 PROCEDURE — 83735 ASSAY OF MAGNESIUM: CPT

## 2020-05-22 PROCEDURE — 84100 ASSAY OF PHOSPHORUS: CPT

## 2020-05-23 LAB
C3 SERPL-MCNC: 86 MG/DL (ref 50–180)
C4 SERPL-MCNC: 21 MG/DL (ref 11–44)

## 2020-05-27 ENCOUNTER — PATIENT OUTREACH (OUTPATIENT)
Dept: ADMINISTRATIVE | Facility: OTHER | Age: 65
End: 2020-05-27

## 2020-05-29 ENCOUNTER — OFFICE VISIT (OUTPATIENT)
Dept: UROLOGY | Facility: CLINIC | Age: 65
End: 2020-05-29
Payer: MEDICARE

## 2020-05-29 VITALS — TEMPERATURE: 98 F | SYSTOLIC BLOOD PRESSURE: 130 MMHG | DIASTOLIC BLOOD PRESSURE: 82 MMHG | HEART RATE: 65 BPM

## 2020-05-29 DIAGNOSIS — N43.3 HYDROCELE IN ADULT: Primary | ICD-10-CM

## 2020-05-29 DIAGNOSIS — N50.89 SCROTAL SWELLING: ICD-10-CM

## 2020-05-29 PROCEDURE — 3079F DIAST BP 80-89 MM HG: CPT | Mod: CPTII,S$GLB,, | Performed by: STUDENT IN AN ORGANIZED HEALTH CARE EDUCATION/TRAINING PROGRAM

## 2020-05-29 PROCEDURE — 99204 PR OFFICE/OUTPT VISIT, NEW, LEVL IV, 45-59 MIN: ICD-10-PCS | Mod: S$GLB,,, | Performed by: STUDENT IN AN ORGANIZED HEALTH CARE EDUCATION/TRAINING PROGRAM

## 2020-05-29 PROCEDURE — 1101F PT FALLS ASSESS-DOCD LE1/YR: CPT | Mod: CPTII,S$GLB,, | Performed by: STUDENT IN AN ORGANIZED HEALTH CARE EDUCATION/TRAINING PROGRAM

## 2020-05-29 PROCEDURE — 3079F PR MOST RECENT DIASTOLIC BLOOD PRESSURE 80-89 MM HG: ICD-10-PCS | Mod: CPTII,S$GLB,, | Performed by: STUDENT IN AN ORGANIZED HEALTH CARE EDUCATION/TRAINING PROGRAM

## 2020-05-29 PROCEDURE — 99204 OFFICE O/P NEW MOD 45 MIN: CPT | Mod: S$GLB,,, | Performed by: STUDENT IN AN ORGANIZED HEALTH CARE EDUCATION/TRAINING PROGRAM

## 2020-05-29 PROCEDURE — 99999 PR PBB SHADOW E&M-EST. PATIENT-LVL III: ICD-10-PCS | Mod: PBBFAC,,, | Performed by: STUDENT IN AN ORGANIZED HEALTH CARE EDUCATION/TRAINING PROGRAM

## 2020-05-29 PROCEDURE — 1101F PR PT FALLS ASSESS DOC 0-1 FALLS W/OUT INJ PAST YR: ICD-10-PCS | Mod: CPTII,S$GLB,, | Performed by: STUDENT IN AN ORGANIZED HEALTH CARE EDUCATION/TRAINING PROGRAM

## 2020-05-29 PROCEDURE — 3075F SYST BP GE 130 - 139MM HG: CPT | Mod: CPTII,S$GLB,, | Performed by: STUDENT IN AN ORGANIZED HEALTH CARE EDUCATION/TRAINING PROGRAM

## 2020-05-29 PROCEDURE — 3075F PR MOST RECENT SYSTOLIC BLOOD PRESS GE 130-139MM HG: ICD-10-PCS | Mod: CPTII,S$GLB,, | Performed by: STUDENT IN AN ORGANIZED HEALTH CARE EDUCATION/TRAINING PROGRAM

## 2020-05-29 PROCEDURE — 99999 PR PBB SHADOW E&M-EST. PATIENT-LVL III: CPT | Mod: PBBFAC,,, | Performed by: STUDENT IN AN ORGANIZED HEALTH CARE EDUCATION/TRAINING PROGRAM

## 2020-05-29 RX ORDER — DOXYCYCLINE 100 MG/1
CAPSULE ORAL DAILY PRN
COMMUNITY
Start: 2020-05-06 | End: 2022-06-10

## 2020-05-29 NOTE — PROGRESS NOTES
"Subjective:       Patient ID: Jian Arrieta is a 64 y.o. male.    Chief Complaint: Hydrocele  This is a 64 y.o.  male patient that is new to me.  The patient is referred to me by Dr. Hanley for hydroceles. He notes he does have scrotal swelling and it is bothersome to him. It has been present for quite some time. He notes that if he "jostles" it or irritates it. He has seen Dr. Ahn at [facility]. He states that they recommended conservative treatment. He notes that the swelling did "go down" on the right side. He had a scrotal US performed at [facility] within the past 6 months. He does not wear any underwear.     Lab Results   Component Value Date    CREATININE 1.9 (H) 05/22/2020      ---  Past Medical History:   Diagnosis Date    Alcohol abuse     Anasarca 1/28/2019    Anemia     Arthropathy associated with neurological disorder 9/2/2015    Atherosclerosis     Charcot foot due to diabetes mellitus     Chronic combined systolic and diastolic heart failure 01/29/2019 1-28-19 Left VentricleModerate decreased ejection fraction at 30%. Normal left ventricular cavity size. Normal wall thickness observed. Grade I (mild) left ventricular diastolic dysfunction consistent with impaired relaxation. Normal left atrial pressure. Moderate, global hypokinesis(see wall scoring diagram). Right VentricleNormal cavity size, wall thickness and ejection fraction. Wall motion n    Chronic pancreatitis 1/28/2019    CKD (chronic kidney disease) stage 3, GFR 30-59 ml/min     CKD (chronic kidney disease) stage 4, GFR 15-29 ml/min     Colon polyps     approx 5 yrs ago    Coronary artery disease due to calcified coronary lesion 05/08/2015    5 stents on ASA      Diabetic polyneuropathy associated with type 2 diabetes mellitus 9/2/2015    Diverticulosis 1/28/2019    DM type 2 with diabetic peripheral neuropathy 2/4/2019    Encounter for blood transfusion     Essential hypertension 1/28/2019    Former smoker 8/26/2015    Healed " ulcer of left foot on examination 6/20/2017    Hematuria     Hydrocele     approx 1.5 yrs ago    Hyperphosphatemia     Hypoalbuminemia 2/4/2019    Hypocalcemia     Lymphedema of both lower extremities 1/29/2019    Mixed hyperlipidemia 5/8/2015    Morbid obesity with BMI of 50.0-59.9, adult 5/8/2015    Onychomycosis of multiple toenails with type 2 diabetes mellitus and peripheral neuropathy 6/20/2017    Perianal cyst     approx 2 yrs ago    Proteinuria     Pseudocyst of pancreas 1/28/2019 1-28-19 Liver has a cirrhotic morphology with no focal lesions.  Significant interval increase in ascites when compared to prior exam which may account for patient's abdominal distension.  Hypodense air-fluid collection along the body of the pancreas which is slightly smaller when compared to prior CT.  Findings may relate to pancreatic necrosis with pancreatic pseudocysts with infected pseudocyst    Skin cancer     skin cancer    Sleep apnea 8/26/2015    Status post bariatric surgery 1/11/2016    Type 2 diabetes mellitus, with long-term current use of insulin 5/8/2015    Urinary tract infection        Past Surgical History:   Procedure Laterality Date    ANGIOGRAPHY N/A 6/28/2019    Procedure: ANGIOGRAM-PV STENT;  Surgeon: Ewa Diagnostic Provider;  Location: [facility] OR;  Service: Radiology;  Laterality: N/A;    ANGIOPLASTY      total x5 stents    COLONOSCOPY N/A 10/6/2015    Procedure: COLONOSCOPY;  Surgeon: Shekhar Richards MD;  Location: [facility] ENDO ([address] [place] [address]);  Service: Endoscopy;  Laterality: N/A;  BMI over 55/2nd floor case    5 day hold Plavix, Dr Kwadwo Arroyo    CORONARY ANGIOGRAPHY Right 3/20/2019    Procedure: ANGIOGRAM, CORONARY ARTERY;  Surgeon: Bob Duque MD;  Location: [facility] CATH LAB;  Service: Cardiology;  Laterality: Right;    CORONARY ARTERY BYPASS GRAFT  2017    x3    CORONARY BYPASS GRAFT ANGIOGRAPHY  3/20/2019    Procedure: Bypass graft study;  Surgeon: Bob Duque MD;   Location: [facility] CATH LAB;  Service: Cardiology;;    CYST REMOVAL      ENDOSCOPIC ULTRASOUND OF UPPER GASTROINTESTINAL TRACT N/A 2/26/2020    Procedure: ULTRASOUND, UPPER GI TRACT, ENDOSCOPIC;  Surgeon: Robbie Yang MD;  Location: [facility] ENDO;  Service: Endoscopy;  Laterality: N/A;    ESOPHAGOGASTRODUODENOSCOPY N/A 7/8/2019    Procedure: ESOPHAGOGASTRODUODENOSCOPY (EGD);  Surgeon: Huan Brumfield MD;  Location: [facility] ENDO;  Service: Endoscopy;  Laterality: N/A;    GASTRECTOMY      INSERTION OF DIALYSIS CATHETER N/A 5/17/2019    Procedure: pleurx;  Surgeon: Ewa Diagnostic Provider;  Location: [facility] 2ND FLR;  Service: General;  Laterality: N/A;  Room 188/[facility]    KNEE ARTHROSCOPY      LAPAROSCOPIC CHOLECYSTECTOMY N/A 5/4/2020    Procedure: CHOLECYSTECTOMY, LAPAROSCOPIC;  Surgeon: SON Rowe MD;  Location: [facility] OR;  Service: General;  Laterality: N/A;    LIVER BIOPSY N/A 5/4/2020    Procedure: BIOPSY, LIVER, Laproscopic ;  Surgeon: SON Rowe MD;  Location: [facility] OR;  Service: General;  Laterality: N/A;    perianal surgery      perianal cyst removed       Family History   Problem Relation Age of Onset    Cancer Mother     Cancer Father     Heart disease Father     Obesity Sister     Parkinsonism Brother     No Known Problems Paternal Grandmother     Cancer Paternal Grandfather     Cancer Brother     Diabetes Maternal Grandmother     Stroke Maternal Grandfather     Cirrhosis Neg Hx        Social History     Tobacco Use    Smoking status: Former Smoker     Packs/day: 2.00     Years: 30.00     Pack years: 60.00     Types: Cigarettes     Last attempt to quit: 2/1/2005     Years since quitting: 15.3    Smokeless tobacco: Never Used   Substance Use Topics    Alcohol use: No     Comment: started ~2014, reports 1 shot daily, max 3 shots daily, vague about alcohol consumption. Last drink 9/2018    Drug use: No       Current Outpatient Medications on File Prior to Visit   Medication  "Sig Dispense Refill    apixaban (ELIQUIS) 5 mg Tab Take 1 tablet (5 mg total) by mouth 2 (two) times daily. 60 tablet 11    blood sugar diagnostic Strp Test strips to use with meter covered by insurance to check blood sugars twice daily. Insulin dependent diabetic. 200 strip 11    blood-glucose meter Misc Glucometer covered by insurance to check blood sugars at home.  Insulin dependent diabetic. 1 each 0    cyanocobalamin, vitamin B-12, 500 mcg Subl Place 1 tablet under the tongue every Monday.       doxycycline (VIBRAMYCIN) 100 MG Cap       ferrous sulfate (FEOSOL ORAL) Take 325 mg by mouth once daily.       furosemide (LASIX) 20 MG tablet Take 1 tablet (20 mg total) by mouth 2 (two) times daily. 60 tablet 11    glucagon, human recombinant, (GLUCAGON EMERGENCY KIT, HUMAN,) 1 mg SolR Inject 1 mg into the muscle as needed (For severely low sugar). 1 each 2    insulin aspart U-100 (NOVOLOG) 100 unit/mL injection Inject 6 Units into the skin 3 (three) times daily before meals.       insulin detemir U-100 (LEVEMIR FLEXTOUCH) 100 unit/mL (3 mL) SubQ InPn pen Inject 20 Units into the skin every evening. (Patient taking differently: Inject 25 Units into the skin every evening. ) 3 mL 12    lancets Misc Lancets to use with meter covered by insurance to check blood sugars twice daily.  Insulin dependent diabetic. 200 each 11    pen needle, diabetic (INSUPEN) 32 gauge x 1/4" Ndle 1 each by Misc.(Non-Drug; Combo Route) route 4 (four) times daily. 360 each 3    bumetanide (BUMEX) 1 MG tablet Take 1 tablet (1 mg total) by mouth before breakfast AND 0.5 tablets (0.5 mg total) every evening. (Patient not taking: Reported on 5/26/2020) 45 tablet 11    metroNIDAZOLE (FLAGYL) 500 MG tablet Take 500 mg by mouth 3 (three) times daily.      sodium chloride 0.9% (NORMAL SALINE FLUSH) injection use as directed to flush drains daily 300 mL 4    sulfamethoxazole/trimethoprim (BACTRIM ORAL) Take by mouth.      traMADoL " (ULTRAM) 50 mg tablet Take 1 tablet (50 mg total) by mouth every 6 (six) hours as needed for Pain. (Patient not taking: Reported on 5/26/2020) 20 tablet 0     No current facility-administered medications on file prior to visit.        Review of patient's allergies indicates:  No Known Allergies    Review of Systems   Constitutional: Negative for chills.   HENT: Negative for congestion.    Eyes: Negative for visual disturbance.   Respiratory: Negative for shortness of breath.    Cardiovascular: Negative for chest pain.   Gastrointestinal: Negative for abdominal distention.   Musculoskeletal: Negative for gait problem.   Skin: Negative for color change.   Neurological: Negative for dizziness.   Psychiatric/Behavioral: Negative for agitation.       Objective:      Physical Exam   Constitutional: He appears well-developed and well-nourished.   HENT:   Head: Normocephalic.   Eyes: Pupils are equal, round, and reactive to light.   Neck: Normal range of motion.   Cardiovascular: Intact distal pulses.   Pulmonary/Chest: Effort normal.   Abdominal: Soft. He exhibits no distension. There is no tenderness.   Genitourinary:   Genitourinary Comments: Right testicle descended, hydrocele present, testicle no masses appreciated.   Left testicle descended, hydrocele also present; no testicular masses appreciated.   Right hydrocele smaller than left side   Musculoskeletal: Normal range of motion.   Neurological: He is alert.   Skin: Skin is warm and dry.   Psychiatric: He has a normal mood and affect.       Assessment:       1. Hydrocele in adult    2. Scrotal swelling        Plan:       1. Patient has bilateral hydroceles. Counseled him that because of his comorbidities, history of ascites, lower extremity swelling, he is high risk for complications, recurrence of hydrocele.  He is at high risk for bleeding also because of his blood thinners.   2. Scrotal US in 2 months, reassess.  3. Would need helmstetter and cardiologist to clear  him to hold eliquis.       Hydrocele in adult  -     Ambulatory referral/consult to Urology  -     US Scrotum And Testicles; Future; Expected date: 07/29/2020    Scrotal swelling  -     US Scrotum And Testicles; Future; Expected date: 07/29/2020

## 2020-05-29 NOTE — LETTER
May 29, 2020      Chanda Hanley MD  [address]  [address]  [facility] [address]           [facility]  [address]  [facility] [address]  Phone: [phone]          Patient: Jian Arrieta   MR Number: [id number]   YOB: 1955   Date of Visit: 5/29/2020       Dear Dr. Chanda Hanley:    Thank you for referring Jian Arrieta to me for evaluation. Attached you will find relevant portions of my assessment and plan of care.    If you have questions, please do not hesitate to call me. I look forward to following Jian Arrieta along with you.    Sincerely,    Tiffanie Caba MD    Enclosure  CC:  No Recipients    If you would like to receive this communication electronically, please contact [email or web address] or [phone] to request more information on [email or web address] Link access.    For providers and/or their staff who would like to refer a patient to [facility], please contact us through our one-stop-shop provider referral line, [facility], at [phone].    If you feel you have received this communication in error or would no longer like to receive these types of communications, please e-mail [email or web address]

## 2020-07-09 ENCOUNTER — CLINICAL SUPPORT (OUTPATIENT)
Dept: ENDOCRINOLOGY | Facility: CLINIC | Age: 65
End: 2020-07-09
Payer: MEDICARE

## 2020-07-09 DIAGNOSIS — N18.30 TYPE 2 DIABETES MELLITUS WITH STAGE 3 CHRONIC KIDNEY DISEASE, WITHOUT LONG-TERM CURRENT USE OF INSULIN: ICD-10-CM

## 2020-07-09 DIAGNOSIS — E11.22 TYPE 2 DIABETES MELLITUS WITH STAGE 3 CHRONIC KIDNEY DISEASE, WITHOUT LONG-TERM CURRENT USE OF INSULIN: ICD-10-CM

## 2020-07-09 NOTE — PROGRESS NOTES
"DIABETES EDUCATOR NOTE   PLACEMENT OF FREESTYLE AUGUSTINE PRO SENSOR  CONTINOUS GLUCOSE MONITORING SYSTEM (CGMS)    Patient is here in clinic today for placement of continuous glucose monitoring sensor.      Each patient verified that they were here for CGMS procedure ordered by their provider and that they have a working glucose meter and supplies at home.   Patient will be provided with a Freestyle Augustine Sensor and a copy of the Continuous Glucose Monitoring Patient Log to fill out during the study.   A detailed  explanation of Continuous Glucose Monitoring was  provided. Patient informed that this is a blind procedure and that they will not actually see the blood sugar tracing in real time.  Reviewed with patient the [employer] patient education handout called "Your Freestyle Augustine Pro sensor: What you need to know" to review self-care during the study to avoid sensor loosening or removal ie... Bathing, Swimming, dressing, and exercising.   Instructed patient to check blood sugar using home glucometer and to record the following on provided patient log sheets:Blood sugar taken at home, Meals and snacks, Activity, and Diabetes medications taken and dosage    Patient was brought to a private location.  Arm for insertion was selected and prepared and allowed to dry. Glucose Sensor Serial Number [id number]  was inserted to back of patient's RIGHT upper arm.    The following forms  were given and reviewed in detail with patient and all questions answered.   · Continuous Glucose Monitoring Patient Log #[id number]  · [employer] Patient Handout "Your Freestyle Augustine Pro Sensor: What you need to know"     Instructions held in a group: Time: 15 min   Insertion of sensor done individually in private:  Time: 5 minutes         "

## 2020-07-15 ENCOUNTER — DOCUMENTATION ONLY (OUTPATIENT)
Dept: CARDIOLOGY | Facility: CLINIC | Age: 65
End: 2020-07-15

## 2020-07-15 ENCOUNTER — CLINICAL SUPPORT (OUTPATIENT)
Dept: ENDOCRINOLOGY | Facility: CLINIC | Age: 65
End: 2020-07-15
Payer: MEDICARE

## 2020-07-15 DIAGNOSIS — Z79.4 TYPE 2 DIABETES MELLITUS WITH OTHER SPECIFIED COMPLICATION, WITH LONG-TERM CURRENT USE OF INSULIN: ICD-10-CM

## 2020-07-15 DIAGNOSIS — E11.69 TYPE 2 DIABETES MELLITUS WITH OTHER SPECIFIED COMPLICATION, WITH LONG-TERM CURRENT USE OF INSULIN: ICD-10-CM

## 2020-07-15 PROCEDURE — 95251 PR GLUCOSE MONITOR, 72 HOUR, PHYS INTERP: ICD-10-PCS | Mod: S$GLB,,, | Performed by: INTERNAL MEDICINE

## 2020-07-15 PROCEDURE — 95250 CONT GLUC MNTR PHYS/QHP EQP: CPT | Mod: S$GLB,,, | Performed by: DIETITIAN, REGISTERED

## 2020-07-15 PROCEDURE — 95251 CONT GLUC MNTR ANALYSIS I&R: CPT | Mod: S$GLB,,, | Performed by: INTERNAL MEDICINE

## 2020-07-15 PROCEDURE — 95250 PR GLUCOSE MONITORING,72 HRS,SUB-Q SENSOR: ICD-10-PCS | Mod: S$GLB,,, | Performed by: DIETITIAN, REGISTERED

## 2020-07-15 NOTE — PROGRESS NOTES
Mr Arrieta is scheduled for oral surgery on 7/31/20.     OK to hold eliquis for 48 hours, if deemed necessary by his surgeon. Restart as soon as hemostasis is achieved.

## 2020-07-16 ENCOUNTER — PATIENT OUTREACH (OUTPATIENT)
Dept: ADMINISTRATIVE | Facility: OTHER | Age: 65
End: 2020-07-16

## 2020-07-17 NOTE — PROGRESS NOTES
DIABETES EDUCATOR NOTE   Return of the Freestyle Lionel Pro Sensor and Patient Log.    Patient returned to clinic today to return Glucose Sensor and signed patient log form used in CM[facility] procedure.    The CGMS Sensor will be scanned and downloaded. All reports will be imported into the patient's electronic medical record.    Endocrine Provider will complete data interpretation and make recommendations; will forward recommendations to the ordering provider for follow up with patient.

## 2020-07-17 NOTE — PROGRESS NOTES
Requested updates within Care Everywhere.  Patient's chart was reviewed for overdue AMANDA topics.  Immunizations reconciled.

## 2020-07-20 NOTE — PROGRESS NOTES
Subjective:      Patient ID: Jian Arrieta is a 64 y.o. male.    Chief Complaint:  Follow-up    History of Present Illness  Pt returns for follow up of type 2 diabetes complicated by Charcot foot, foot ulcer-now resolved, BMI 39, CKD stage III, s/p sleeve gastrectomy that is uncontrolled and complicated.  Previously seen by Dr. Carney and Dr. Stauffer. Last visit in April 2020.     He will have 10 teeth removed this week.     With regards to the diabetes:    Diagnosed with Type 2 DM in 2016  Episode of pancreatitis around 2018, portal vein thrombosis and underwent angioplasty.   Family History of Type 2 DM: none  Known complications:  DKA/HHS: none  RN: none; note reviewed from 11/2019  PN: +; was seeing podiatry in the past  Nephropathy: + sees nephrology  Gastroparesis: none  CAD: CABG around 2016  Diabetes complications: CKD stage III, s/p sleeve gastrectomy    Current regimen:  Novolog 6 units in the AM  Levemir 25 units in the morning --> takes it every morning  Patient administers his own insulin.     Missed doses? Skips novolog due to fear of low blood sugars    Other medications tried:  Metformin    1 # times a day testing  Takes it once daily in AM  BG ranges 168-444, most in 200's    in clinic today            He recently had CGMS clinic-not read. Reviewed data. He has persistently elevated blood sugars related to once daily dosing of novolog. 1% low blood sugars likely inaccurate.          Fasting:   Patient feels unwell with blood sugars less than 110. He loses ability to function with low blood sugars. His lowest blood sugar has been in the 40s (around Jan 2020). Seen at [facility].     Dietary recall: He is not following diabetic diet. Appetite is good. Does not eat as much as before.   Eats 3 meals a day.   Snacks: snowballs  Drinks:     Exercise - tried to stay active. No formal exercise. Active in the yard, garage.      Education - last visit: 7/15/2020 -EZEQUIEL Palacios/Daveqsimi:  has    Diabetes Management Status  Statin: Not taking  ACE/ARB: Not taking    Lab Results   Component Value Date    HGBA1C 9.4 (H) 04/20/2020    HGBA1C 12.4 (H) 02/23/2020    HGBA1C 12.5 (H) 02/21/2020     Screening or Prevention Patient's value Goal Complete/Controlled?   HgA1C Testing and Control   Lab Results   Component Value Date    HGBA1C 9.4 (H) 04/20/2020      Annually/Less than 8% No   Lipid profile : 02/21/2020 Annually Yes   LDL control Lab Results   Component Value Date    LDLCALC 76.4 02/21/2020    Annually/Less than 100 mg/dl  Yes   Nephropathy screening Lab Results   Component Value Date    LABMICR 113.0 02/27/2020     Lab Results   Component Value Date    PROTEINUA Trace (A) 05/22/2020    Annually Yes   Blood pressure BP Readings from Last 1 Encounters:   07/21/20 (!) 150/70    Less than 140/90 Yes   Dilated retinal exam : 11/22/2019 Annually Yes   Foot exam   : 11/15/2019 Annually Yes       Review of Systems   Constitutional: Negative for chills and fever.   HENT: Positive for hearing loss. Negative for congestion and sinus pressure.    Eyes: Positive for visual disturbance.   Respiratory: Negative for chest tightness and shortness of breath.    Cardiovascular: Negative for chest pain and palpitations.   Gastrointestinal: Negative for abdominal distention, diarrhea, nausea and vomiting.   Endocrine: Negative for polydipsia, polyphagia and polyuria.   Genitourinary: Negative for dysuria and flank pain.   Musculoskeletal: Negative for back pain.   Skin: Negative for rash.   Neurological: Negative for weakness.   Hematological: Does not bruise/bleed easily.   Psychiatric/Behavioral: Negative for sleep disturbance.       Objective:   Physical Exam  Vitals signs reviewed.   Constitutional:       Appearance: He is well-developed.   Neck:      Thyroid: No thyromegaly.   Cardiovascular:      Rate and Rhythm: Normal rate.   Pulmonary:      Effort: Pulmonary effort is normal.   Abdominal:      Palpations:  "Abdomen is soft.   Musculoskeletal:      Right lower leg: Edema present.      Left lower leg: Edema present.      injection sites are without edema or erythema. No lipo hypertropthy or atrophy  Diabetes Foot Exam:   Denies sores or lesions to bilateral feet  Shoes appropriate  DM foot exam deferred, done in Nov 2019    Vitals:    07/21/20 1013   BP: (!) 150/70   Weight: 108.9 kg (240 lb)   Height: 5' 7" (1.702 m)       BP Readings from Last 3 Encounters:   07/21/20 (!) 150/70   05/29/20 130/82   05/26/20 100/60     Wt Readings from Last 1 Encounters:   07/21/20 1013 108.9 kg (240 lb)     Body mass index is 37.59 kg/m².    Lab Review:   Lab Results   Component Value Date    HGBA1C 9.4 (H) 04/20/2020     Lab Results   Component Value Date    CHOL 143 02/21/2020    HDL 49 02/21/2020    LDLCALC 76.4 02/21/2020    TRIG 88 02/21/2020    CHOLHDL 34.3 02/21/2020     Lab Results   Component Value Date     05/22/2020    K 4.3 05/22/2020     (H) 05/22/2020    CO2 24 05/22/2020    GLU 60 (L) 05/22/2020    BUN 21 05/22/2020    CREATININE 1.9 (H) 05/22/2020    CALCIUM 8.7 05/22/2020    PROT 5.4 (L) 05/05/2020    ALBUMIN 3.1 (L) 05/22/2020    BILITOT 0.3 05/05/2020    ALKPHOS 122 05/05/2020    AST 48 (H) 05/05/2020    ALT 59 (H) 05/05/2020    ANIONGAP 5 (L) 05/22/2020    ESTGFRAFRICA 42 (A) 05/22/2020    EGFRNONAA 36 (A) 05/22/2020    TSH 2.211 01/28/2019        Ref. Range 3/17/2020 05:39   Hemoglobin Latest Ref Range: 14.0 - 18.0 g/dL 10.0 (L)   Hematocrit Latest Ref Range: 40.0 - 54.0 % 31.7 (L)      Ref. Range 2/27/2020 03:36   MICROALB/CREAT RATIO Latest Ref Range: 0.0 - 30.0 ug/mg 156.3 (H)       Assessment and Plan     1. Type 2 diabetes mellitus with other specified complication, with long-term current use of insulin  Comprehensive metabolic panel    Hemoglobin A1C    sub-q insulin device, 20 unit (VGO 20) Sandi    insulin aspart U-100 (NOVOLOG) 100 unit/mL injection   2. Weight loss     3. Hyperlipidemia " associated with type 2 diabetes mellitus     4. Hypertension associated with diabetes       Type 2 diabetes mellitus, with long-term current use of insulin  Complicated by neuropathy, non compliance and fear of low blood sugars  Diagnosed in 2016  On MDI but not taking blood sugar before every meal and not giving insulin before lunch and dinner. CGMs reveals persistently elevated blood sugars above target.   -- For now, I encouraged BG checks AC and HS and encouraged to take Novolog 6 units before every meal. Continue Levemir 25 units daily. Considering his noncompliance with insulin doses coupled with fear of hypoglycemia, we discussed Vgo and he is interested. Will start with Vgo 20-2 clicks with meals and 1 click with snacks. If he is able to obtain, we will schedule diabetic education. Considering BGs in 40's in the past coupled with fear of hypoglycemia, we dexcom briefly for high and low blood sugar alerts-will use [employer] data to send to insurance company for approval.  Labs today  -- Given information on how to correct blood sugars in order to not increase blood sugar above goal  -- given information on low carb snacks and drinks  -- Reviewed goals of therapy are to get the best control we can without hypoglycemia  -- Reviewed patient's current insulin regimen. Clarified proper insulin dose and timing in relation to meals, etc. Insulin injection sites and proper rotation instructed.    -- Advised frequent self blood glucose monitoring.  Patient encouraged to document glucose results and bring them to every clinic visit    -- Hypoglycemia precautions discussed. Instructed on precautions before driving.    -- Call for Bg repeatedly < 90 or > 180.   -- Close adherence to lifestyle changes recommended.   -- Periodic follow ups for eye evaluations, foot care and dental care suggested.        Weight loss  May be related to hyperglycemia  Have advised to check blood sugars and use insulin.    Hyperlipidemia  associated with type 2 diabetes mellitus  Controlled   On statin per ADA recommendations      Hypertension associated with diabetes  -- not on ACEI ARB-defer to renal   -- Controlled  -- Blood pressure goals discussed with patient        Follow up in about 4 weeks (around 8/18/2020).

## 2020-07-21 ENCOUNTER — OFFICE VISIT (OUTPATIENT)
Dept: ENDOCRINOLOGY | Facility: CLINIC | Age: 65
End: 2020-07-21
Payer: MEDICARE

## 2020-07-21 ENCOUNTER — PATIENT MESSAGE (OUTPATIENT)
Dept: ENDOCRINOLOGY | Facility: CLINIC | Age: 65
End: 2020-07-21

## 2020-07-21 VITALS
SYSTOLIC BLOOD PRESSURE: 150 MMHG | DIASTOLIC BLOOD PRESSURE: 70 MMHG | WEIGHT: 240 LBS | BODY MASS INDEX: 37.67 KG/M2 | HEIGHT: 67 IN

## 2020-07-21 DIAGNOSIS — E11.69 TYPE 2 DIABETES MELLITUS WITH OTHER SPECIFIED COMPLICATION, WITH LONG-TERM CURRENT USE OF INSULIN: Primary | ICD-10-CM

## 2020-07-21 DIAGNOSIS — E78.5 HYPERLIPIDEMIA ASSOCIATED WITH TYPE 2 DIABETES MELLITUS: ICD-10-CM

## 2020-07-21 DIAGNOSIS — Z79.4 TYPE 2 DIABETES MELLITUS WITH OTHER SPECIFIED COMPLICATION, WITH LONG-TERM CURRENT USE OF INSULIN: Primary | ICD-10-CM

## 2020-07-21 DIAGNOSIS — E11.59 HYPERTENSION ASSOCIATED WITH DIABETES: ICD-10-CM

## 2020-07-21 DIAGNOSIS — I15.2 HYPERTENSION ASSOCIATED WITH DIABETES: ICD-10-CM

## 2020-07-21 DIAGNOSIS — R63.4 WEIGHT LOSS: ICD-10-CM

## 2020-07-21 DIAGNOSIS — E11.69 HYPERLIPIDEMIA ASSOCIATED WITH TYPE 2 DIABETES MELLITUS: ICD-10-CM

## 2020-07-21 PROCEDURE — 1101F PT FALLS ASSESS-DOCD LE1/YR: CPT | Mod: CPTII,S$GLB,, | Performed by: NURSE PRACTITIONER

## 2020-07-21 PROCEDURE — 99999 PR PBB SHADOW E&M-EST. PATIENT-LVL V: CPT | Mod: PBBFAC,,, | Performed by: NURSE PRACTITIONER

## 2020-07-21 PROCEDURE — 3008F BODY MASS INDEX DOCD: CPT | Mod: CPTII,S$GLB,, | Performed by: NURSE PRACTITIONER

## 2020-07-21 PROCEDURE — 1101F PR PT FALLS ASSESS DOC 0-1 FALLS W/OUT INJ PAST YR: ICD-10-PCS | Mod: CPTII,S$GLB,, | Performed by: NURSE PRACTITIONER

## 2020-07-21 PROCEDURE — 3078F DIAST BP <80 MM HG: CPT | Mod: CPTII,S$GLB,, | Performed by: NURSE PRACTITIONER

## 2020-07-21 PROCEDURE — 99214 OFFICE O/P EST MOD 30 MIN: CPT | Mod: S$GLB,,, | Performed by: NURSE PRACTITIONER

## 2020-07-21 PROCEDURE — 99999 PR PBB SHADOW E&M-EST. PATIENT-LVL V: ICD-10-PCS | Mod: PBBFAC,,, | Performed by: NURSE PRACTITIONER

## 2020-07-21 PROCEDURE — 3077F SYST BP >= 140 MM HG: CPT | Mod: CPTII,S$GLB,, | Performed by: NURSE PRACTITIONER

## 2020-07-21 PROCEDURE — 3078F PR MOST RECENT DIASTOLIC BLOOD PRESSURE < 80 MM HG: ICD-10-PCS | Mod: CPTII,S$GLB,, | Performed by: NURSE PRACTITIONER

## 2020-07-21 PROCEDURE — 3008F PR BODY MASS INDEX (BMI) DOCUMENTED: ICD-10-PCS | Mod: CPTII,S$GLB,, | Performed by: NURSE PRACTITIONER

## 2020-07-21 PROCEDURE — 3046F PR MOST RECENT HEMOGLOBIN A1C LEVEL > 9.0%: ICD-10-PCS | Mod: CPTII,S$GLB,, | Performed by: NURSE PRACTITIONER

## 2020-07-21 PROCEDURE — 99214 PR OFFICE/OUTPT VISIT, EST, LEVL IV, 30-39 MIN: ICD-10-PCS | Mod: S$GLB,,, | Performed by: NURSE PRACTITIONER

## 2020-07-21 PROCEDURE — 3046F HEMOGLOBIN A1C LEVEL >9.0%: CPT | Mod: CPTII,S$GLB,, | Performed by: NURSE PRACTITIONER

## 2020-07-21 PROCEDURE — 3077F PR MOST RECENT SYSTOLIC BLOOD PRESSURE >= 140 MM HG: ICD-10-PCS | Mod: CPTII,S$GLB,, | Performed by: NURSE PRACTITIONER

## 2020-07-21 RX ORDER — INSULIN ASPART 100 [IU]/ML
INJECTION, SOLUTION INTRAVENOUS; SUBCUTANEOUS
Qty: 3 VIAL | Refills: 3 | Status: SHIPPED | OUTPATIENT
Start: 2020-07-21 | End: 2020-10-19

## 2020-07-21 NOTE — PATIENT INSTRUCTIONS
Labs today  We discussed persistently high blood sugars. We will try for Vgo 20 -2 clicks with every meal and one with snacks. He agrees to call us if covered by insurance and we will consult diabetic education. We will also try to use fanta data to obtain continuous glucose monitor-dexcom for high and low blood sugar alerts and alarms.     Gladiator smoothies or protein drinks including glucerna or boost glucose control      Diabetic drinks we recommend:   Water -BEST  Crystal light sugar free packets  Gatorade zero   Powerade zero  Tea without sweetener    Diabetic drinks we do not recommend:  Coke or any sugary regular soft drink  Coffee with sugar  Milk  Juice  Beer  Liquor    Sweeteners we recommend:  Stevia   Salina Oleary    Note: Caffeine can increase your blood sugar     Snacks can be an important part of a balanced, healthy meal plan. They allow you to eat more frequently, feeling full and satisfied throughout the day. Also, they allow you to spread carbohydrates evenly, which may stabilize blood sugars.  Plus, snacks are enjoyable!     The amount of carbohydrate needed at snacks varies. Generally, about 15-30 grams of carbohydrate per snack is recommended.  Below you will find some tasty treats.       0-5 gm carb   Crystal Light   Vitamin Water Zero   Herbal tea, unsweetened   2 tsp peanut butter on celery   1./2 cup sugar-free jell-o   1 sugar-free popsicle   ¼ cup blueberries   8oz Blue Rebecca unsweetened almond milk   5 baby carrots & celery sticks, cucumbers, bell peppers dipped in ¼ cup salsa, 2Tbsp light ranch dressing or 2Tbsp plain Greek yogurt   10 Goldfish crackers   ½ oz low-fat cheese or string cheese   1 closed handful of nuts, unsalted   1 Tbsp of sunflower seeds, unsalted   1 cup Smart Pop popcorn   1 whole grain brown rice cake        15 gm carb   1 small piece of fruit or ½ banana or 1/2 cup lite canned fruit   3 anna cracker squares   3 cups Smart Pop popcorn, top  spray butter, Estrada lite salt or cinnamon and Truvia   5 Vanilla Wafers   ½ cup low fat, no added sugar ice cream or frozen yogurt (Blue bell, Blue Bunny, Weight Watchers, Skinny Cow)   ½ turkey, ham, or chicken sandwich   ½ c fruit with ½ c Cottage cheese   4-6 unsalted wheat crackers with 1 oz low fat cheese or 1 tbsp peanut butter    30-45 goldfish crackers (depending on flavor)    7-8 Pentecostal mini brown rice cakes (caramel, apple cinnamon, chocolate)    12 Pentecostal mini brown rice cakes (cheddar, bbq, ranch)    1/3 cup hummus dip with raw veg   1/2 whole wheat tomasz, 1Tbsp hummus   Mini Pizza (1/2 whole wheat English muffin, low-fat  cheese, tomato sauce)   100 calorie snack pack (Oreo, Chips Ahoy, Ritz Mix, Baked Cheetos)   4-6 oz. light or Greek Style yogurt (Chobani, Yoplait, Okios, [place]field)   ½ cup sugar-free pudding     6 in. wheat tortilla or tomasz oven toasted chips (topped with spray butter flavoring, cinnamon, Truvia OR spray butter, garlic powder, chili powder)    18 BBQ Popchips (available at [employer], [employer], Fresh Market)     Hypoglycemia - Low Blood Sugar    What can you do?  Rule of 15:    Test your blood sugar   If glucose is between 50-70 mg/dL then ingest 15 grams of fast-acting carbs   If glucose is less than 50 mg/dL then ingest 30 grams of fast-acting carbs   Ingest 15 grams of fast-acting carbohydrate - such as:   a. 3-4 glucose tablets  b. 4 oz juice  c. ½ can regular soda pop  d. 15 skittles or mini jelly beans    Re-check your blood sugar in 15 minutes. If its less than 70mg/dl, repeat steps 2 and 3.   If your next meal is more than 1 hour away, eat an additional 15 grams of carbohydrate and 1 ounce of protein (examples include crackers with cheese or one-half of a sandwich with peanut butter). It is important not to eat too much because this can raise your blood sugar above the target level.      After your blood sugar has normalized, think about why you went low. If  you notice a pattern of low blood sugars, contact your Diabetes Team. We may need to adjust your medication.

## 2020-07-21 NOTE — ASSESSMENT & PLAN NOTE
Complicated by neuropathy, non compliance and fear of low blood sugars  Diagnosed in 2016  On MDI but not taking blood sugar before every meal and not giving insulin before lunch and dinner. CGMs reveals persistently elevated blood sugars above target.   -- For now, I encouraged BG checks AC and HS and encouraged to take Novolog 6 units before every meal. Continue Levemir 25 units daily. Considering his noncompliance with insulin doses coupled with fear of hypoglycemia, we discussed Vgo and he is interested. Will start with Vgo 20-2 clicks with meals and 1 click with snacks. If he is able to obtain, we will schedule diabetic education. Considering BGs in 40's in the past coupled with fear of hypoglycemia, we dexcom briefly for high and low blood sugar alerts-will use [employer] data to send to insurance company for approval.  Labs today  -- Given information on how to correct blood sugars in order to not increase blood sugar above goal  -- given information on low carb snacks and drinks  -- Reviewed goals of therapy are to get the best control we can without hypoglycemia  -- Reviewed patient's current insulin regimen. Clarified proper insulin dose and timing in relation to meals, etc. Insulin injection sites and proper rotation instructed.    -- Advised frequent self blood glucose monitoring.  Patient encouraged to document glucose results and bring them to every clinic visit    -- Hypoglycemia precautions discussed. Instructed on precautions before driving.    -- Call for Bg repeatedly < 90 or > 180.   -- Close adherence to lifestyle changes recommended.   -- Periodic follow ups for eye evaluations, foot care and dental care suggested.

## 2020-07-21 NOTE — ASSESSMENT & PLAN NOTE
-- not on ACEI ARB-defer to renal   -- Controlled  -- Blood pressure goals discussed with patient

## 2020-07-22 ENCOUNTER — TELEPHONE (OUTPATIENT)
Dept: ENDOCRINOLOGY | Facility: CLINIC | Age: 65
End: 2020-07-22

## 2020-07-23 ENCOUNTER — HOSPITAL ENCOUNTER (OUTPATIENT)
Dept: RADIOLOGY | Facility: HOSPITAL | Age: 65
Discharge: HOME | End: 2020-07-23
Attending: STUDENT IN AN ORGANIZED HEALTH CARE EDUCATION/TRAINING PROGRAM
Payer: MEDICARE

## 2020-07-23 DIAGNOSIS — N43.3 HYDROCELE IN ADULT: ICD-10-CM

## 2020-07-23 DIAGNOSIS — N50.89 SCROTAL SWELLING: ICD-10-CM

## 2020-07-23 PROCEDURE — 76870 US EXAM SCROTUM: CPT | Mod: 26,,, | Performed by: RADIOLOGY

## 2020-07-23 PROCEDURE — 76870 US SCROTUM AND TESTICLES: ICD-10-PCS | Mod: 26,,, | Performed by: RADIOLOGY

## 2020-07-23 PROCEDURE — 76870 US EXAM SCROTUM: CPT | Mod: TC

## 2020-07-29 ENCOUNTER — PATIENT OUTREACH (OUTPATIENT)
Dept: ADMINISTRATIVE | Facility: OTHER | Age: 65
End: 2020-07-29

## 2020-07-29 NOTE — PROGRESS NOTES
Requested updates within Care Everywhere.  Patient's chart was reviewed for overdue AMANDA topics.  Immunizations reconciled.    Orders placed:  Tasked appts:  Labs Linked:

## 2020-07-30 ENCOUNTER — OFFICE VISIT (OUTPATIENT)
Dept: UROLOGY | Facility: CLINIC | Age: 65
End: 2020-07-30
Payer: MEDICARE

## 2020-07-30 DIAGNOSIS — N43.3 HYDROCELE, UNSPECIFIED HYDROCELE TYPE: Primary | ICD-10-CM

## 2020-07-30 PROCEDURE — 1101F PR PT FALLS ASSESS DOC 0-1 FALLS W/OUT INJ PAST YR: ICD-10-PCS | Mod: CPTII,S$GLB,, | Performed by: STUDENT IN AN ORGANIZED HEALTH CARE EDUCATION/TRAINING PROGRAM

## 2020-07-30 PROCEDURE — 1101F PT FALLS ASSESS-DOCD LE1/YR: CPT | Mod: CPTII,S$GLB,, | Performed by: STUDENT IN AN ORGANIZED HEALTH CARE EDUCATION/TRAINING PROGRAM

## 2020-07-30 PROCEDURE — 99999 PR PBB SHADOW E&M-EST. PATIENT-LVL III: CPT | Mod: PBBFAC,,, | Performed by: STUDENT IN AN ORGANIZED HEALTH CARE EDUCATION/TRAINING PROGRAM

## 2020-07-30 PROCEDURE — 99999 PR PBB SHADOW E&M-EST. PATIENT-LVL III: ICD-10-PCS | Mod: PBBFAC,,, | Performed by: STUDENT IN AN ORGANIZED HEALTH CARE EDUCATION/TRAINING PROGRAM

## 2020-07-30 PROCEDURE — 99214 PR OFFICE/OUTPT VISIT, EST, LEVL IV, 30-39 MIN: ICD-10-PCS | Mod: S$GLB,,, | Performed by: STUDENT IN AN ORGANIZED HEALTH CARE EDUCATION/TRAINING PROGRAM

## 2020-07-30 PROCEDURE — 99214 OFFICE O/P EST MOD 30 MIN: CPT | Mod: S$GLB,,, | Performed by: STUDENT IN AN ORGANIZED HEALTH CARE EDUCATION/TRAINING PROGRAM

## 2020-07-30 NOTE — PROGRESS NOTES
"Subjective:       Patient ID: Jian Arrieta is a 64 y.o. male.    Chief Complaint:  followup scrotal US  This is a 64 y.o.  male patient that is an established patient of mine.  He was referred to me by Dr. Hanley for hydroceles. He notes he does have scrotal swelling and it is bothersome to him. It has been present for quite some time. He notes that if he "jostles" it or irritates it. He has seen Dr. Ahn at [facility]. He states that they recommended conservative treatment. He notes that the swelling did "go down" on the right side. He had a scrotal US performed at [facility] within the past 6 months. He does not wear any underwear.     He is here to f/u after his scrotal US which was performed on 7/23/20. Us demonstrated normal testicles, large bilateral hydroceles.     Lab Results   Component Value Date    CREATININE 2.0 (H) 07/21/2020     ---  Past Medical History:   Diagnosis Date    Alcohol abuse     Anasarca 1/28/2019    Anemia     Arthropathy associated with neurological disorder 9/2/2015    Atherosclerosis     Charcot foot due to diabetes mellitus     Chronic combined systolic and diastolic heart failure 01/29/2019 1-28-19 Left VentricleModerate decreased ejection fraction at 30%. Normal left ventricular cavity size. Normal wall thickness observed. Grade I (mild) left ventricular diastolic dysfunction consistent with impaired relaxation. Normal left atrial pressure. Moderate, global hypokinesis(see wall scoring diagram). Right VentricleNormal cavity size, wall thickness and ejection fraction. Wall motion n    Chronic pancreatitis 1/28/2019    CKD (chronic kidney disease) stage 3, GFR 30-59 ml/min     CKD (chronic kidney disease) stage 4, GFR 15-29 ml/min     Colon polyps     approx 5 yrs ago    Coronary artery disease due to calcified coronary lesion 05/08/2015    5 stents on ASA      Diabetic polyneuropathy associated with type 2 diabetes mellitus 9/2/2015    Diverticulosis 1/28/2019    DM type 2 " with diabetic peripheral neuropathy 2/4/2019    Encounter for blood transfusion     Essential hypertension 1/28/2019    Former smoker 8/26/2015    Healed ulcer of left foot on examination 6/20/2017    Hematuria     Hydrocele     approx 1.5 yrs ago    Hyperphosphatemia     Hypoalbuminemia 2/4/2019    Hypocalcemia     Lymphedema of both lower extremities 1/29/2019    Mixed hyperlipidemia 5/8/2015    Morbid obesity with BMI of 50.0-59.9, adult 5/8/2015    Onychomycosis of multiple toenails with type 2 diabetes mellitus and peripheral neuropathy 6/20/2017    Perianal cyst     approx 2 yrs ago    Proteinuria     Pseudocyst of pancreas 1/28/2019 1-28-19 Liver has a cirrhotic morphology with no focal lesions.  Significant interval increase in ascites when compared to prior exam which may account for patient's abdominal distension.  Hypodense air-fluid collection along the body of the pancreas which is slightly smaller when compared to prior CT.  Findings may relate to pancreatic necrosis with pancreatic pseudocysts with infected pseudocyst    Skin cancer     skin cancer    Sleep apnea 8/26/2015    Status post bariatric surgery 1/11/2016    Type 2 diabetes mellitus, with long-term current use of insulin 5/8/2015    Urinary tract infection        Past Surgical History:   Procedure Laterality Date    ANGIOGRAPHY N/A 6/28/2019    Procedure: ANGIOGRAM-PV STENT;  Surgeon: Ewa Diagnostic Provider;  Location: [facility] OR;  Service: Radiology;  Laterality: N/A;    ANGIOPLASTY      total x5 stents    COLONOSCOPY N/A 10/6/2015    Procedure: COLONOSCOPY;  Surgeon: Shekhar Richards MD;  Location: [facility] ENDO ([address] [place] [address]);  Service: Endoscopy;  Laterality: N/A;  BMI over 55/2nd floor case    5 day hold Plavix, Dr Kwadwo Arroyo    CORONARY ANGIOGRAPHY Right 3/20/2019    Procedure: ANGIOGRAM, CORONARY ARTERY;  Surgeon: Bob Duque MD;  Location: [facility] CATH LAB;  Service: Cardiology;  Laterality: Right;    CORONARY  ARTERY BYPASS GRAFT  2017    x3    CORONARY BYPASS GRAFT ANGIOGRAPHY  3/20/2019    Procedure: Bypass graft study;  Surgeon: Bbo Duque MD;  Location: [facility] CATH LAB;  Service: Cardiology;;    CYST REMOVAL      ENDOSCOPIC ULTRASOUND OF UPPER GASTROINTESTINAL TRACT N/A 2/26/2020    Procedure: ULTRASOUND, UPPER GI TRACT, ENDOSCOPIC;  Surgeon: Robbie Yang MD;  Location: [facility] ENDO;  Service: Endoscopy;  Laterality: N/A;    ESOPHAGOGASTRODUODENOSCOPY N/A 7/8/2019    Procedure: ESOPHAGOGASTRODUODENOSCOPY (EGD);  Surgeon: Huan Brumfield MD;  Location: [facility] ENDO;  Service: Endoscopy;  Laterality: N/A;    GASTRECTOMY      INSERTION OF DIALYSIS CATHETER N/A 5/17/2019    Procedure: pleurx;  Surgeon: Ewa Diagnostic Provider;  [facility];  Service: General;  Laterality: N/A;  Room [facility]    KNEE ARTHROSCOPY      LAPAROSCOPIC CHOLECYSTECTOMY N/A 5/4/2020    Procedure: CHOLECYSTECTOMY, LAPAROSCOPIC;  Surgeon: SON Rowe MD;  Location: [facility] OR;  Service: General;  Laterality: N/A;    LIVER BIOPSY N/A 5/4/2020    Procedure: BIOPSY, LIVER, Laproscopic ;  Surgeon: SON Rowe MD;  Location: [facility] OR;  Service: General;  Laterality: N/A;    perianal surgery      perianal cyst removed       Family History   Problem Relation Age of Onset    Cancer Mother     Cancer Father     Heart disease Father     Obesity Sister     Parkinsonism Brother     No Known Problems Paternal Grandmother     Cancer Paternal Grandfather     Cancer Brother     Diabetes Maternal Grandmother     Stroke Maternal Grandfather     Cirrhosis Neg Hx        Social History     Tobacco Use    Smoking status: Former Smoker     Packs/day: 2.00     Years: 30.00     Pack years: 60.00     Types: Cigarettes     Quit date: 2/1/2005     Years since quitting: 15.5    Smokeless tobacco: Never Used   Substance Use Topics    Alcohol use: No     Comment: started ~2014, reports 1 shot daily, max 3 shots daily,  "vague about alcohol consumption. Last drink 9/2018    Drug use: No       Current Outpatient Medications on File Prior to Visit   Medication Sig Dispense Refill    blood sugar diagnostic Strp Test strips to use with meter covered by insurance to check blood sugars twice daily. Insulin dependent diabetic. 200 strip 11    blood-glucose meter Misc Glucometer covered by insurance to check blood sugars at home.  Insulin dependent diabetic. 1 each 0    cyanocobalamin, vitamin B-12, 500 mcg Subl Place 1 tablet under the tongue every Monday.       ferrous sulfate (FEOSOL ORAL) Take 325 mg by mouth once daily.       glucagon, human recombinant, (GLUCAGON EMERGENCY KIT, HUMAN,) 1 mg SolR Inject 1 mg into the muscle as needed (For severely low sugar). 1 each 2    insulin aspart U-100 (NOVOLOG) 100 unit/mL injection To use with Vgo 20 with 2 clicks with meals. TDD 90 3 vial 3    insulin detemir U-100 (LEVEMIR FLEXTOUCH) 100 unit/mL (3 mL) SubQ InPn pen Inject 20 Units into the skin every evening. (Patient taking differently: Inject 25 Units into the skin every evening. ) 3 mL 12    lancets Misc Lancets to use with meter covered by insurance to check blood sugars twice daily.  Insulin dependent diabetic. 200 each 11    pen needle, diabetic (INSUPEN) 32 gauge x 1/4" Ndle 1 each by Misc.(Non-Drug; Combo Route) route 4 (four) times daily. 360 each 3    sodium chloride 0.9% (NORMAL SALINE FLUSH) injection use as directed to flush drains daily 300 mL 4    sub-q insulin device, 20 unit (VGO 20) Sandi 1 Device by Misc.(Non-Drug; Combo Route) route once daily. 30 Device 3    traMADoL (ULTRAM) 50 mg tablet Take 1 tablet (50 mg total) by mouth every 6 (six) hours as needed for Pain. 20 tablet 0    apixaban (ELIQUIS) 5 mg Tab Take 1 tablet (5 mg total) by mouth 2 (two) times daily. (Patient not taking: Reported on 7/30/2020) 60 tablet 11    bumetanide (BUMEX) 1 MG tablet Take 1 tablet (1 mg total) by mouth before breakfast AND " 0.5 tablets (0.5 mg total) every evening. (Patient not taking: Reported on 7/30/2020) 45 tablet 11    doxycycline (VIBRAMYCIN) 100 MG Cap       furosemide (LASIX) 20 MG tablet Take 20 mg by mouth 2 (two) times daily.      insulin aspart U-100 (NOVOLOG) 100 unit/mL injection Inject 6 Units into the skin 3 (three) times daily before meals.       metroNIDAZOLE (FLAGYL) 500 MG tablet Take 500 mg by mouth 3 (three) times daily.      sulfamethoxazole/trimethoprim (BACTRIM ORAL) Take by mouth.       No current facility-administered medications on file prior to visit.        Review of patient's allergies indicates:  No Known Allergies    Review of Systems   Constitutional: Negative for chills.   HENT: Negative for congestion.    Eyes: Negative for visual disturbance.   Respiratory: Negative for shortness of breath.    Cardiovascular: Negative for chest pain.   Gastrointestinal: Negative for abdominal distention.   Musculoskeletal: Negative for gait problem.   Skin: Negative for color change.   Neurological: Negative for dizziness.   Psychiatric/Behavioral: Negative for agitation.       Objective:      Physical Exam  Constitutional:       Appearance: He is well-developed.   HENT:      Head: Normocephalic.   Eyes:      Pupils: Pupils are equal, round, and reactive to light.   Neck:      Musculoskeletal: Normal range of motion.   Pulmonary:      Effort: Pulmonary effort is normal.   Abdominal:      Palpations: Abdomen is soft.   Musculoskeletal: Normal range of motion.   Skin:     General: Skin is warm and dry.   Neurological:      Mental Status: He is alert.         Assessment:       1. Hydrocele, unspecified hydrocele type        Plan:       1. Patient has bilateral hydroceles. Counseled him that because of his comorbidities, history of ascites, lower extremity swelling, he is high risk for complications, recurrence of hydrocele.  He is at high risk for bleeding also because of his blood thinners.   2. Scrotal US does  not show any abnormal findings other than the hydroceles, testicles have good flow and no abnormalities seen.   3. Would consider performing bilateral hydrocelectomy if Dr. Barajas and cardiologist clear him for surgery and holding blood thinning medications and if Hga1c is 7.     Hydrocele, unspecified hydrocele type

## 2020-08-14 NOTE — PROGRESS NOTES
Subjective:      Patient ID: Jian Arrieta is a 64 y.o. male.    Chief Complaint:  Diabetes    History of Present Illness  Pt returns for follow up of type 2 diabetes complicated by Charcot foot, foot ulcer-now resolved, BMI 39, CKD stage III, s/p sleeve gastrectomy that is uncontrolled and complicated.  Previously seen by Dr. Carney and Dr. Stauffer and myself. Last visit in July 2020. He is here today for diabetes assessment and medication assessment and adjustment.    He arrives today with Vgo   He will have 10 teeth removed on Thursday. He supposed to have last week but dentist had COVID. States that he will be on soft diet for at least 2 weeks after dental work. He reports he has not taken heart medicine in 3 weeks as he was readying himself for dental work.     With regards to the diabetes:    Diagnosed with Type 2 DM in 2016  Episode of pancreatitis around 2018, portal vein thrombosis and underwent angioplasty.   Family History of Type 2 DM: none  Known complications:  DKA/HHS: none  RN: none; note reviewed from 11/2019  PN: +; was seeing podiatry in the past  Nephropathy: + sees nephrology  Gastroparesis: none  CAD: CABG around 2016  Diabetes complications: CKD stage III, s/p sleeve gastrectomy    Current regimen:  Novolog 6 units in the AM  Levemir 25 units in the morning --> takes it every morning -- >took this AM  Patient administers his own insulin.     Missed doses? Skips novolog due to fear of low blood sugars    Other medications tried:  Metformin    1 # times a day testing   Takes it once daily in AM   this AM  BG in clinic today is 75, 9 hours PP   No log for review    Fasting:   Patient feels unwell with blood sugars less than 110. He loses ability to function with low blood sugars. His lowest blood sugar has been in the 40s (around Jan 2020). Seen at [facility].     Dietary recall: He is not following diabetic diet. Appetite is good. Does not eat as much as before.   Eats 3 meals a  day.   Snacks: cookies, candy  Drinks: tea and water    Exercise - tried to stay active. No formal exercise. Active in the yard, garage.      Education - last visit: 7/15/2020 -EZEQUIEL Palacios/Baqsimi: has    Diabetes Management Status  Statin: Not taking  ACE/ARB: Not taking    Lab Results   Component Value Date    HGBA1C 10.0 (H) 07/21/2020    HGBA1C 9.4 (H) 04/20/2020    HGBA1C 12.4 (H) 02/23/2020     Screening or Prevention Patient's value Goal Complete/Controlled?   HgA1C Testing and Control   Lab Results   Component Value Date    HGBA1C 10.0 (H) 07/21/2020      Annually/Less than 8% No   Lipid profile : 02/21/2020 Annually Yes   LDL control Lab Results   Component Value Date    LDLCALC 76.4 02/21/2020    Annually/Less than 100 mg/dl  Yes   Nephropathy screening Lab Results   Component Value Date    LABMICR 113.0 02/27/2020     Lab Results   Component Value Date    PROTEINUA Trace (A) 05/22/2020    Annually Yes   Blood pressure BP Readings from Last 1 Encounters:   08/18/20 (!) 144/74    Less than 140/90 Yes   Dilated retinal exam : 05/15/2020 Annually Yes   Foot exam   : 11/15/2019 Annually Yes     Review of Systems   Constitutional: Negative for fatigue.   HENT: Positive for hearing loss.    Eyes: Positive for visual disturbance.   Respiratory: Negative for shortness of breath.    Cardiovascular: Negative for chest pain.   Gastrointestinal: Negative for abdominal pain.   Musculoskeletal: Negative for arthralgias.   Skin: Negative for wound.   Neurological: Negative for headaches.   Hematological: Does not bruise/bleed easily.   Psychiatric/Behavioral: Negative for sleep disturbance.       Objective:   Physical Exam  Vitals signs reviewed.   Constitutional:       Appearance: He is well-developed.   Neck:      Thyroid: No thyromegaly.   Cardiovascular:      Rate and Rhythm: Normal rate.   Pulmonary:      Effort: Pulmonary effort is normal.   Abdominal:      Palpations: Abdomen is soft.   Musculoskeletal:  "     Right lower leg: Edema present.      Left lower leg: Edema present.      injection sites are without edema or erythema. No lipo hypertropthy or atrophy  Diabetes Foot Exam:   Denies sores or lesions to bilateral feet  Shoes appropriate  DM foot exam deferred, done in Nov 2019    Vitals:    08/18/20 1307   BP: (!) 144/74   Pulse: 70   Weight: 103 kg (227 lb 1.2 oz)   Height: 5' 7" (1.702 m)       BP Readings from Last 3 Encounters:   08/18/20 (!) 144/74   07/28/20 135/75   07/21/20 (!) 150/70     Wt Readings from Last 1 Encounters:   08/18/20 1307 103 kg (227 lb 1.2 oz)     Body mass index is 35.56 kg/m².    Lab Review:   Lab Results   Component Value Date    HGBA1C 10.0 (H) 07/21/2020     Lab Results   Component Value Date    CHOL 143 02/21/2020    HDL 49 02/21/2020    LDLCALC 76.4 02/21/2020    TRIG 88 02/21/2020    CHOLHDL 34.3 02/21/2020     Lab Results   Component Value Date     07/21/2020    K 4.9 07/21/2020     07/21/2020    CO2 26 07/21/2020     (H) 07/21/2020    BUN 20 07/21/2020    CREATININE 2.0 (H) 07/21/2020    CALCIUM 8.7 07/21/2020    PROT 6.7 07/21/2020    ALBUMIN 3.5 07/21/2020    BILITOT 0.5 07/21/2020    ALKPHOS 187 (H) 07/21/2020    AST 45 (H) 07/21/2020    ALT 82 (H) 07/21/2020    ANIONGAP 4 (L) 07/21/2020    ESTGFRAFRICA 39.3 (A) 07/21/2020    EGFRNONAA 34.0 (A) 07/21/2020    TSH 2.211 01/28/2019        Ref. Range 3/17/2020 05:39   Hemoglobin Latest Ref Range: 14.0 - 18.0 g/dL 10.0 (L)   Hematocrit Latest Ref Range: 40.0 - 54.0 % 31.7 (L)      Ref. Range 2/27/2020 03:36   MICROALB/CREAT RATIO Latest Ref Range: 0.0 - 30.0 ug/mg 156.3 (H)       Assessment and Plan     1. Type 2 diabetes mellitus with other specified complication, with long-term current use of insulin  Ambulatory referral/consult to Diabetes Education    blood-glucose transmitter (DEXCOM G6 TRANSMITTER) Sandi    blood-glucose sensor (DEXCOM G6 SENSOR) Sandi   2. Weight loss     3. Hyperlipidemia associated " with type 2 diabetes mellitus     4. Hypertension associated with diabetes       Type 2 diabetes mellitus, with long-term current use of insulin  Complicated by neuropathy, non compliance and fear of low blood sugars  Diagnosed in 2016  On MDI but not taking blood sugar before every meal and not giving insulin before lunch and dinner. He is having DM education for Vgo today   Medication Changes:   Stop Levemir and Novolog injections  Start Vgo 20 -1 clicks with a meal and 1 click with a snack TOMORROW (he took Lantus this AM)  We will start off with one click with meals as he will be on soft diet and no longer eating nondiabetic foods -states he will be drinking broth. I fear he will eat mashed potatoes and other soft foods and sugary drinks. We will bring him back in one month to assess how he is doing with the Vgo.     -- Considering BGs in 40's in the past coupled with fear of hypoglycemia, we will call in continuous glucose monitor dexcom. He agrees to call us if you receive and we will set up education.   -- preciously given information on how to correct blood sugars in order to not increase blood sugar above goal  -- previously given information on low carb snacks and drinks  -- Reviewed goals of therapy are to get the best control we can without hypoglycemia  -- Reviewed patient's current insulin regimen. Clarified proper insulin dose and timing in relation to meals, etc. Insulin injection sites and proper rotation instructed.    -- Advised frequent self blood glucose monitoring.  Patient encouraged to document glucose results and bring them to every clinic visit    -- Hypoglycemia precautions discussed. Instructed on precautions before driving.    -- Call for Bg repeatedly < 90 or > 180.   -- Close adherence to lifestyle changes recommended.   -- Periodic follow ups for eye evaluations, foot care and dental care suggested.        Weight loss  May be related to hyperglycemia  Have advised to check blood  sugars and use insulin.    Hyperlipidemia associated with type 2 diabetes mellitus  Controlled   On statin per ADA recommendations      Hypertension associated with diabetes  -- not on ACEI ARB-defer to renal   -- Controlled  -- Blood pressure goals discussed with patient        Follow up in about 4 weeks (around 9/15/2020).

## 2020-08-18 ENCOUNTER — OFFICE VISIT (OUTPATIENT)
Dept: ENDOCRINOLOGY | Facility: CLINIC | Age: 65
End: 2020-08-18
Payer: MEDICARE

## 2020-08-18 ENCOUNTER — CLINICAL SUPPORT (OUTPATIENT)
Dept: DIABETES | Facility: CLINIC | Age: 65
End: 2020-08-18
Payer: MEDICARE

## 2020-08-18 VITALS
HEART RATE: 70 BPM | WEIGHT: 227.06 LBS | BODY MASS INDEX: 35.64 KG/M2 | SYSTOLIC BLOOD PRESSURE: 144 MMHG | DIASTOLIC BLOOD PRESSURE: 74 MMHG | HEIGHT: 67 IN

## 2020-08-18 DIAGNOSIS — E11.59 HYPERTENSION ASSOCIATED WITH DIABETES: ICD-10-CM

## 2020-08-18 DIAGNOSIS — I15.2 HYPERTENSION ASSOCIATED WITH DIABETES: ICD-10-CM

## 2020-08-18 DIAGNOSIS — E11.69 TYPE 2 DIABETES MELLITUS WITH OTHER SPECIFIED COMPLICATION, WITH LONG-TERM CURRENT USE OF INSULIN: ICD-10-CM

## 2020-08-18 DIAGNOSIS — R63.4 WEIGHT LOSS: ICD-10-CM

## 2020-08-18 DIAGNOSIS — E11.69 HYPERLIPIDEMIA ASSOCIATED WITH TYPE 2 DIABETES MELLITUS: ICD-10-CM

## 2020-08-18 DIAGNOSIS — E78.5 HYPERLIPIDEMIA ASSOCIATED WITH TYPE 2 DIABETES MELLITUS: ICD-10-CM

## 2020-08-18 DIAGNOSIS — Z79.4 TYPE 2 DIABETES MELLITUS WITH OTHER SPECIFIED COMPLICATION, WITH LONG-TERM CURRENT USE OF INSULIN: ICD-10-CM

## 2020-08-18 PROCEDURE — 99999 PR PBB SHADOW E&M-EST. PATIENT-LVL I: CPT | Mod: PBBFAC,,,

## 2020-08-18 PROCEDURE — 3008F PR BODY MASS INDEX (BMI) DOCUMENTED: ICD-10-PCS | Mod: CPTII,S$GLB,, | Performed by: NURSE PRACTITIONER

## 2020-08-18 PROCEDURE — 3008F BODY MASS INDEX DOCD: CPT | Mod: CPTII,S$GLB,, | Performed by: NURSE PRACTITIONER

## 2020-08-18 PROCEDURE — 3046F PR MOST RECENT HEMOGLOBIN A1C LEVEL > 9.0%: ICD-10-PCS | Mod: CPTII,S$GLB,, | Performed by: NURSE PRACTITIONER

## 2020-08-18 PROCEDURE — 99999 PR PBB SHADOW E&M-EST. PATIENT-LVL V: CPT | Mod: PBBFAC,,, | Performed by: NURSE PRACTITIONER

## 2020-08-18 PROCEDURE — 99213 PR OFFICE/OUTPT VISIT, EST, LEVL III, 20-29 MIN: ICD-10-PCS | Mod: S$GLB,,, | Performed by: NURSE PRACTITIONER

## 2020-08-18 PROCEDURE — 3077F PR MOST RECENT SYSTOLIC BLOOD PRESSURE >= 140 MM HG: ICD-10-PCS | Mod: CPTII,S$GLB,, | Performed by: NURSE PRACTITIONER

## 2020-08-18 PROCEDURE — G0108 PR DIAB MANAGE TRN  PER INDIV: ICD-10-PCS | Mod: S$GLB,,, | Performed by: INTERNAL MEDICINE

## 2020-08-18 PROCEDURE — 1101F PR PT FALLS ASSESS DOC 0-1 FALLS W/OUT INJ PAST YR: ICD-10-PCS | Mod: CPTII,S$GLB,, | Performed by: NURSE PRACTITIONER

## 2020-08-18 PROCEDURE — 3077F SYST BP >= 140 MM HG: CPT | Mod: CPTII,S$GLB,, | Performed by: NURSE PRACTITIONER

## 2020-08-18 PROCEDURE — 3078F PR MOST RECENT DIASTOLIC BLOOD PRESSURE < 80 MM HG: ICD-10-PCS | Mod: CPTII,S$GLB,, | Performed by: NURSE PRACTITIONER

## 2020-08-18 PROCEDURE — 3046F HEMOGLOBIN A1C LEVEL >9.0%: CPT | Mod: CPTII,S$GLB,, | Performed by: NURSE PRACTITIONER

## 2020-08-18 PROCEDURE — 1101F PT FALLS ASSESS-DOCD LE1/YR: CPT | Mod: CPTII,S$GLB,, | Performed by: NURSE PRACTITIONER

## 2020-08-18 PROCEDURE — 99999 PR PBB SHADOW E&M-EST. PATIENT-LVL I: ICD-10-PCS | Mod: PBBFAC,,,

## 2020-08-18 PROCEDURE — 99999 PR PBB SHADOW E&M-EST. PATIENT-LVL V: ICD-10-PCS | Mod: PBBFAC,,, | Performed by: NURSE PRACTITIONER

## 2020-08-18 PROCEDURE — 99213 OFFICE O/P EST LOW 20 MIN: CPT | Mod: S$GLB,,, | Performed by: NURSE PRACTITIONER

## 2020-08-18 PROCEDURE — G0108 DIAB MANAGE TRN  PER INDIV: HCPCS | Mod: S$GLB,,, | Performed by: INTERNAL MEDICINE

## 2020-08-18 PROCEDURE — 3078F DIAST BP <80 MM HG: CPT | Mod: CPTII,S$GLB,, | Performed by: NURSE PRACTITIONER

## 2020-08-18 RX ORDER — BLOOD-GLUCOSE TRANSMITTER
1 EACH MISCELLANEOUS CONTINUOUS PRN
Qty: 1 EACH | Status: SHIPPED | OUTPATIENT
Start: 2020-08-18 | End: 2020-09-01 | Stop reason: SDUPTHER

## 2020-08-18 RX ORDER — BLOOD-GLUCOSE SENSOR
3 EACH MISCELLANEOUS CONTINUOUS PRN
Qty: 3 EACH | Status: SHIPPED | OUTPATIENT
Start: 2020-08-18 | End: 2020-09-01 | Stop reason: SDUPTHER

## 2020-08-18 NOTE — PATIENT INSTRUCTIONS
Stop Levemir and Novolog injections  Start Vgo 20 -1 clicks with a meal and 1 click with a snack TOMORROW  He will be starting soft diet for 2 weeks after his teeth are removed this Thursday.     We will call in continuous glucose monitor dexcom for you. Call us if you receive and we will set up education.

## 2020-08-18 NOTE — ASSESSMENT & PLAN NOTE
Complicated by neuropathy, non compliance and fear of low blood sugars  Diagnosed in 2016  On MDI but not taking blood sugar before every meal and not giving insulin before lunch and dinner. He is having DM education for Vgo today   Medication Changes:   Stop Levemir and Novolog injections  Start Vgo 20 -1 clicks with a meal and 1 click with a snack TOMORROW (he took Lantus this AM)  We will start off with one click with meals as he will be on soft diet and no longer eating nondiabetic foods -states he will be drinking broth. I fear he will eat mashed potatoes and other soft foods and sugary drinks. We will bring him back in one month to assess how he is doing with the Vgo.     -- Considering BGs in 40's in the past coupled with fear of hypoglycemia, we will call in continuous glucose monitor dexcom. He agrees to call us if you receive and we will set up education.   -- preciously given information on how to correct blood sugars in order to not increase blood sugar above goal  -- previously given information on low carb snacks and drinks  -- Reviewed goals of therapy are to get the best control we can without hypoglycemia  -- Reviewed patient's current insulin regimen. Clarified proper insulin dose and timing in relation to meals, etc. Insulin injection sites and proper rotation instructed.    -- Advised frequent self blood glucose monitoring.  Patient encouraged to document glucose results and bring them to every clinic visit    -- Hypoglycemia precautions discussed. Instructed on precautions before driving.    -- Call for Bg repeatedly < 90 or > 180.   -- Close adherence to lifestyle changes recommended.   -- Periodic follow ups for eye evaluations, foot care and dental care suggested.

## 2020-08-28 NOTE — PROGRESS NOTES
Diabetes Education  Author: Melissa Silver RD, CDE  Date: 8/18/2020    Diabetes Care Management Summary  Diabetes Education Record Progress: Comprehensive   (VGo training)    Current Diabetes Risk Level: High     Last A1c:   Lab Results   Component Value Date    HGBA1C 10.0 (H) 07/21/2020     Last Visit with Diabetes Educator:  3/20/2020    Last OPCM Referral: : 01/30/2019   Enrolled in OPCM: No    Diabetes Type : Type II  Diabetes Diagnosis: >10 years      Current Treatment: Insulin  Levemir 25 units in AM;   Novolog 6 units (prescribed for AC) pt only takes at breakfast            Reviewed Problem List with Patient: Yes    Health Maintenance was reviewed today with patient. Discussed with patient importance of routine eye exams, foot exams/foot care, blood work (i.e.: A1c, microalbumin, and lipid), dental visits, yearly flu vaccine, and pneumonia vaccine as indicated by PCP. Patient verbalized understanding.     Health Maintenance Topics with due status: Not Due       Topic Last Completion Date    TETANUS VACCINE 03/13/2017    Colorectal Cancer Screening 05/23/2018    Influenza Vaccine 11/15/2019    Foot Exam 11/15/2019    Lipid Panel 02/21/2020    Eye Exam 05/15/2020    Hemoglobin A1c 07/21/2020     Health Maintenance Due   Topic Date Due    Shingles Vaccine (1 of 2) 08/19/2004    Pneumococcal Vaccine (65+ Low/Medium Risk) (1 of 2 - PCV13) 08/19/2019       Nutrition  Meal Planning: 3 meals per day    Monitoring   Self Monitoring : (SMBG 1-2 times daily)  Do you use a personal continuous glucose monitor?: No  (pending Dexcom)  In the last month, how often have you had a low blood sugar reaction?: once    Exercise   Exercise Type: none    Social History  Preferred Learning Method: Face to Face, Demonstration, Hands On, Reading Materials  Primary Support: Self, Spouse  Smoking Status: Ex Smoker  Barriers to Change: None  Learning Challenges : None  Readiness to Learn : Acceptance  Cultural Influences:  No        Diabetes Education Assessment/Progress  Nutrition (Incorporating nutritional management into one's lifestyle): Discussion, Instructed, Individual Session, Comprehends Key Points  Medications (states correct name, dose, onset, peak, duration, side effects & timing of meds): Discussion, Instructed, Individual Session, Written Materials Provided, Comprehends Key Points, Demonstration, Return Demonstration  Monitoring (monitoring blood glucose/other parameters & using results): Discussion, Instructed, Individual Session, Comprehends Key Points, Written Materials Provided  Acute Complications (preventing, detecting, and treating acute complications): Discussion, Instructed, Individual Session, Comprehends Key Points    VGO INSULIN DELIVERY DEVICE TRAINING  Patient is here for training on the VGo 20 which will provide 20 units of basal insulin given over 24 hours (0.833 units/hr) and up to 36 units of on-demand bolus dosing in 2-unit increments.     Patient will only be using Novolog insulin in the VGo system. He/she will be giving 1 click (2 units) at each meal and 1 click at each snack (while he remains on a liquid diet following dental upcoming dental procedures).    Patient was instructed to hold their long-acting insulin last night / this morning. Patient was also advised that he/she will discontinue taking all insulin injections and that he/she will only use the Novolog vial with the VGo system.     Patient was instructed on the following:    Insert vial into the EZ Fill and leave in place until vial is empty   Remove plug and insert VGo    Draw insulin into EZ Fill and fill VGo with insulin (check that VGo is full of insulin)    Remove VGo and clean and replace plug (advised to wipe the circular opening with an alcohol swab before replacing)    Select site for VGo (site selection reviewed) and prepare infusion site with aseptic technique    Remove button cover and adhesive liner    Attach VGo to  site selected and insert needle by pushing needle button in to activate basal rate    Instructed patient on how to activate the bolus ready button and to push the bolus delivery button into the VGo to deliver 2 units of insulin (1 click = 2 units). Patient advised that once all 36 units of the on-demand bolus have been given, the bolus buttons will lock, but the basal insulin will continue for the remainder of the 24 hours    To remove, release needle by sliding and pressing the needle release button    Remove the VGo and discard (the VGo is 100% disposable)   Patient instructed that the VGo needs to be changed every 24 hours    Patient was able to perform successful return demonstration of all.     General precautions reviewed with the patient:    VGo needs to be removed before having an MRI. Replace with a new V-Go after the test or procedure is completed. Patient also advised to check with her doctor before any type of surgical procedure to see if the V-Go can be worn.    Patient advised to monitor his/her BG at least 4 times a day (pre-meals and at bedtime)    Patient advised to keep back up long-acting insulin pens (non-) should a problem develop with the VGo. Patient also advised that he/she should have directions from her MD regarding insulin doses should he/she need to switch back to multiple daily injections temporarily.    The VGo should not be exposed to direct sunlight, hot tub, whirlpool or sauna use (replace with a new filled VGo afterward)    Check that the VGo is securely in place during and after periods of increased physical activity, exposure to water, or gone under water to the depth of 3 feet, 3 inches (the VGo can go under water and continue to work safely)    Reviewed s/s and tx of hypoglycemia    All of patient's questions and concerns were addressed.   Phone number was provided and patient advised to call with any questions or concerns.               Goals  Medications: Set   (Change VGo at same time every day and give 1 click with every meal as instructed)  Start Date: 08/18/20  Target Date: 11/30/20         Diabetes Care Plan/Intervention  Education Plan/Intervention: Individual Follow-Up DSMT  (f/u in 2 weeks)          Diabetes Meal Plan  Restrictions: Restricted Carbohydrate          Today's Self-Management Care Plan was developed with the patient's input and is based on barriers identified during today's assessment.    The long and short-term goals in the care plan were written with the patient/caregiver's input. The patient has agreed to work toward these goals to improve his overall diabetes control.      The patient received a copy of today's self-management plan and verbalized understanding of the care plan, goals, and all of today's instructions.      The patient was encouraged to communicate with his physician and care team regarding his condition(s) and treatment.  I provided the patient with my contact information today and encouraged him to contact me via phone or patient portal as needed.           Education Units of Time   Time Spent: 60 min

## 2020-08-30 ENCOUNTER — PATIENT OUTREACH (OUTPATIENT)
Dept: ADMINISTRATIVE | Facility: OTHER | Age: 65
End: 2020-08-30

## 2020-08-31 ENCOUNTER — CLINICAL SUPPORT (OUTPATIENT)
Dept: DIABETES | Facility: CLINIC | Age: 65
End: 2020-08-31
Payer: MEDICARE

## 2020-08-31 DIAGNOSIS — E11.22 TYPE 2 DIABETES MELLITUS WITH STAGE 3 CHRONIC KIDNEY DISEASE, WITH LONG-TERM CURRENT USE OF INSULIN: ICD-10-CM

## 2020-08-31 DIAGNOSIS — N18.30 TYPE 2 DIABETES MELLITUS WITH STAGE 3 CHRONIC KIDNEY DISEASE, WITH LONG-TERM CURRENT USE OF INSULIN: ICD-10-CM

## 2020-08-31 DIAGNOSIS — Z79.4 TYPE 2 DIABETES MELLITUS WITH STAGE 3 CHRONIC KIDNEY DISEASE, WITH LONG-TERM CURRENT USE OF INSULIN: ICD-10-CM

## 2020-08-31 PROCEDURE — G0108 PR DIAB MANAGE TRN  PER INDIV: ICD-10-PCS | Mod: S$GLB,,, | Performed by: INTERNAL MEDICINE

## 2020-08-31 PROCEDURE — G0108 DIAB MANAGE TRN  PER INDIV: HCPCS | Mod: S$GLB,,, | Performed by: INTERNAL MEDICINE

## 2020-08-31 NOTE — PROGRESS NOTES
Diabetes Education  Author: Melissa Silver RD, CDE  Date: 2020    Diabetes Care Management Summary  Diabetes Education Record Progress: Comprehensive   (Vgo start F/U)    Current Diabetes Risk Level: High     Last A1c:   Lab Results   Component Value Date    HGBA1C 10.0 (H) 2020     Last Visit with Diabetes Educator:  2020    Last OPCM Referral: : 2019   Enrolled in OPC: No    Diabetes Type : Type II  Diabetes Diagnosis: >10 years      Current Treatment: Insulin  Novolog via VGo20  -  1 click with each meal & snack              Reviewed Problem List with Patient: Yes    Health Maintenance was reviewed today with patient. Discussed with patient importance of routine eye exams, foot exams/foot care, blood work (i.e.: A1c, microalbumin, and lipid), dental visits, yearly flu vaccine, and pneumonia vaccine as indicated by PCP. Patient verbalized understanding.     Health Maintenance Topics with due status: Not Due       Topic Last Completion Date    TETANUS VACCINE 2017    Colorectal Cancer Screening 2018    Influenza Vaccine 11/15/2019    Foot Exam 11/15/2019    Lipid Panel 2020    Eye Exam 05/15/2020    Hemoglobin A1c 2020     Health Maintenance Due   Topic Date Due    Shingles Vaccine (1 of 2) 2004    Pneumococcal Vaccine (65+ Low/Medium Risk) (1 of 2 - PCV13) 2019       Nutrition  Meal Planning: (2-3 meals daily; +snacks;  since dental procedure on , only eating soft/liquid foods)        Monitoring   Self Monitoring : hasn't tested since starting VGo; ran out of test strips and reports no longer covered; meter is >3 years old and strips were ; needs new meter            Tested in clinic today, 430 mg/dL and 30 min later (after 2 clicks) 403 mg/dL (ate donuts and OJ, and a SF snowball this morning)    Do you use a personal continuous glucose monitor?: No    In the last month, how often have you had a low blood sugar reaction?: never  (unsure if  "had any hypos as he has not been testing)        Exercise   Exercise Type: (no structured exercise, but active around the house)        Social History  Preferred Learning Method: Face to Face, Demonstration, Hands On, Reading Materials  Primary Support: Self, Spouse    Smoking Status: Ex Smoker  Barriers to Change: None    Learning Challenges : None  Readiness to Learn : Acceptance  Cultural Influences: No        Diabetes Education Assessment/Progress  Nutrition (Incorporating nutritional management into one's lifestyle): Discussion, Instructed, Individual Session, Comprehends Key Points, Written Materials Provided       Discussed the foods he has been eating over the last week since he had his teeth pulled. Breakfast is usually eggs and grits, dinner is gumbo or potato soup. Also having snacks such as shebert, SF snowballs, and other sweets. Discussed likely eating more carb at meals than we expected him to with "soft/liquid" diet, so likely that 1 click is not enough.     Medications (states correct name, dose, onset, peak, duration, side effects & timing of meds): Discussion, Instructed, Individual Session, Written Materials Provided, Comprehends Key Points, Return Demonstration, Demonstrates Understanding/Competency(verbalizes/demonstrates)       Reviewed diet and BG today with NP Scobel - per her recommendations, instructed pt to increase to 3 clicks with meals and 1 click with dessert or snack. He has f/u with Scobel in 2 weeks.    Monitoring (monitoring blood glucose/other parameters & using results): Discussion, Instructed, Individual Session, Comprehends Key Points, Demonstration       Reviewed NEED to test BG. He needs new glucometer - will send paperwork to [employer] for new glucometer. Also trying to get Dexcom, but likely not yet covered as he has not been doing any fingersticks.     Acute Complications (preventing, detecting, and treating acute complications): Discussion, Instructed, Individual " Session, Comprehends Key Points       Reviewed hypo s/s and appropriate treatment.           Goals  Patient has selected/evaluated goals during today's session: Yes, evaluated    Medications: In Progress   (Change VGo at same time every day and give 3 clicks with every meal as instructed)  Start Date: 08/18/20  Target Date: 11/30/20         Diabetes Care Plan/Intervention  Education Plan/Intervention: Individual Follow-Up DSMT  (f/u with NP in 2 weeks; f/u with DE within 3 months)          Diabetes Meal Plan  Restrictions: Restricted Carbohydrate          Today's Self-Management Care Plan was developed with the patient's input and is based on barriers identified during today's assessment.    The long and short-term goals in the care plan were written with the patient/caregiver's input. The patient has agreed to work toward these goals to improve his overall diabetes control.      The patient received a copy of today's self-management plan and verbalized understanding of the care plan, goals, and all of today's instructions.      The patient was encouraged to communicate with his physician and care team regarding his condition(s) and treatment.  I provided the patient with my contact information today and encouraged him to contact me via phone or patient portal as needed.           Education Units of Time   Time Spent: 45 min

## 2020-09-01 DIAGNOSIS — E11.69 TYPE 2 DIABETES MELLITUS WITH OTHER SPECIFIED COMPLICATION, WITH LONG-TERM CURRENT USE OF INSULIN: ICD-10-CM

## 2020-09-01 DIAGNOSIS — Z79.4 TYPE 2 DIABETES MELLITUS WITH OTHER SPECIFIED COMPLICATION, WITH LONG-TERM CURRENT USE OF INSULIN: ICD-10-CM

## 2020-09-01 RX ORDER — BLOOD-GLUCOSE SENSOR
3 EACH MISCELLANEOUS CONTINUOUS PRN
Qty: 3 EACH | Status: SHIPPED | OUTPATIENT
Start: 2020-09-01 | End: 2020-09-16

## 2020-09-01 RX ORDER — BLOOD-GLUCOSE TRANSMITTER
1 EACH MISCELLANEOUS CONTINUOUS PRN
Qty: 1 EACH | Status: SHIPPED | OUTPATIENT
Start: 2020-09-01 | End: 2020-09-16

## 2020-09-11 NOTE — TELEPHONE ENCOUNTER
----- Message from Ana Franz sent at 4/29/2020 10:10 AM CDT -----  Contact: self [phone]  JPB - Patient is returning your call. Please call  
Spoke to pt to confirm upcoming appt with   
none

## 2020-09-15 NOTE — PROGRESS NOTES
Subjective:      Patient ID: Jian Arrieta is a 65 y.o. male.    Chief Complaint:  Diabetes    History of Present Illness  Pt returns for follow up of type 2 diabetes complicated by Charcot foot, foot ulcer-now resolved, BMI 39, CKD stage III, s/p sleeve gastrectomy that is uncontrolled and complicated.  Previously seen by Dr. Carney and Dr. Stauffer and myself. Last visit in Aug  2020. He is here today for diabetes assessment and medication assessment and adjustment.    He will have denture impressions done tomorrow after having all of his teeth removed.  He is still doing soft diet.     Needs A1c <8 for hydrocele surgery     With regards to the diabetes:    Diagnosed with Type 2 DM in 2016  Episode of pancreatitis around 2018, portal vein thrombosis and underwent angioplasty.   Family History of Type 2 DM: none  Known complications:  DKA/HHS: none  RN: none; note reviewed from 11/2019  PN: +; was seeing podiatry in the past  Nephropathy: + sees nephrology  Gastroparesis: none  CAD: CABG around 2016  Diabetes complications: CKD stage III, s/p sleeve gastrectomy    Current regimen:  Vgo 20 -3 clicks with meals and 1-2 click with a dessert    Missed doses-not missing clicks    Other medications tried:  Metformin    4 # times a day testing   AM BGs 120-240  Before lunch: 200s  Before dinner:         Fasting:    Patient feels unwell with blood sugars less than 110. He loses ability to function with low blood sugars. His lowest blood sugar has been in the 40s (around Jan 2020). Seen at [facility].   Hypoglycemia once since last visit. BG of 67.     Dietary recall: He is not following diabetic diet. Appetite is good. Does not eat as much as before.   Eats 3 meals a day. Eating more soup.  Snacks: cookies, candy  Drinks: tea and water    Exercise - tried to stay active. No formal exercise. Active in the yard, garage.      Education - last visit: 8/31/2020 -EZEQUIEL Palacios/Baqsimi: has    Diabetes  Management Status  Statin: Not taking  ACE/ARB: Not taking    Lab Results   Component Value Date    HGBA1C 10.0 (H) 07/21/2020    HGBA1C 9.4 (H) 04/20/2020    HGBA1C 12.4 (H) 02/23/2020     Screening or Prevention Patient's value Goal Complete/Controlled?   HgA1C Testing and Control   Lab Results   Component Value Date    HGBA1C 10.0 (H) 07/21/2020      Annually/Less than 8% No   Lipid profile : 02/21/2020 Annually Yes   LDL control Lab Results   Component Value Date    LDLCALC 76.4 02/21/2020    Annually/Less than 100 mg/dl  Yes   Nephropathy screening Lab Results   Component Value Date    LABMICR 113.0 02/27/2020     Lab Results   Component Value Date    PROTEINUA Trace (A) 05/22/2020    Annually Yes   Blood pressure BP Readings from Last 1 Encounters:   09/16/20 (!) 140/70    Less than 140/90 Yes   Dilated retinal exam : 05/15/2020 Annually Yes   Foot exam   : 11/15/2019 Annually Yes     Review of Systems   Constitutional: Negative for fatigue.   HENT: Positive for hearing loss.    Eyes: Positive for visual disturbance.   Respiratory: Negative for shortness of breath.    Cardiovascular: Negative for chest pain.   Gastrointestinal: Negative for abdominal pain.   Musculoskeletal: Negative for arthralgias.   Skin: Negative for wound.   Neurological: Negative for headaches.   Hematological: Does not bruise/bleed easily.   Psychiatric/Behavioral: Negative for sleep disturbance.       Objective:   Physical Exam  Vitals signs reviewed.   Constitutional:       Appearance: He is well-developed.   Neck:      Thyroid: No thyromegaly.   Cardiovascular:      Rate and Rhythm: Normal rate.   Pulmonary:      Effort: Pulmonary effort is normal.   Abdominal:      Palpations: Abdomen is soft.   Musculoskeletal:      Right lower leg: Edema present.      Left lower leg: Edema present.      injection sites are without edema or erythema. No lipo hypertropthy or atrophy  Diabetes Foot Exam:   Denies sores or lesions to bilateral  "feet  Shoes appropriate  DM foot exam deferred, done in Nov 2019    Vitals:    09/16/20 1524   BP: (!) 140/70   Weight: 112.2 kg (247 lb 5.7 oz)   Height: 5' 7" (1.702 m)       BP Readings from Last 3 Encounters:   09/16/20 (!) 140/70   08/18/20 (!) 144/74   07/28/20 135/75     Wt Readings from Last 1 Encounters:   09/16/20 1524 112.2 kg (247 lb 5.7 oz)     Body mass index is 38.74 kg/m².    Lab Review:   Lab Results   Component Value Date    HGBA1C 10.0 (H) 07/21/2020     Lab Results   Component Value Date    CHOL 143 02/21/2020    HDL 49 02/21/2020    LDLCALC 76.4 02/21/2020    TRIG 88 02/21/2020    CHOLHDL 34.3 02/21/2020     Lab Results   Component Value Date     07/21/2020    K 4.9 07/21/2020     07/21/2020    CO2 26 07/21/2020     (H) 07/21/2020    BUN 20 07/21/2020    CREATININE 2.0 (H) 07/21/2020    CALCIUM 8.7 07/21/2020    PROT 6.7 07/21/2020    ALBUMIN 3.5 07/21/2020    BILITOT 0.5 07/21/2020    ALKPHOS 187 (H) 07/21/2020    AST 45 (H) 07/21/2020    ALT 82 (H) 07/21/2020    ANIONGAP 4 (L) 07/21/2020    ESTGFRAFRICA 39.3 (A) 07/21/2020    EGFRNONAA 34.0 (A) 07/21/2020    TSH 2.211 01/28/2019        Ref. Range 3/17/2020 05:39   Hemoglobin Latest Ref Range: 14.0 - 18.0 g/dL 10.0 (L)   Hematocrit Latest Ref Range: 40.0 - 54.0 % 31.7 (L)      Ref. Range 2/27/2020 03:36   MICROALB/CREAT RATIO Latest Ref Range: 0.0 - 30.0 ug/mg 156.3 (H)       Assessment and Plan     1. Type 2 diabetes mellitus with other specified complication, with long-term current use of insulin  Comprehensive metabolic panel    Hemoglobin A1C    blood-glucose transmitter (DEXCOM G6 TRANSMITTER) Sandi    blood-glucose sensor (DEXCOM G6 SENSOR) Sandi   2. Weight loss     3. Hyperlipidemia associated with type 2 diabetes mellitus     4. Hypertension associated with diabetes       Type 2 diabetes mellitus, with long-term current use of insulin  Complicated by neuropathy, non compliance and fear of low blood sugars    Medication " Changes:    Continue current Vgo 20-3 clicks with meals and 1 click with snack   We will check labs in one month   We will try for Dexcom -continuous glucose monitor. Message me and we will schedule diabetes education so you can learn how to apply     -- Considering BGs in 40's in the past coupled with fear of hypoglycemia, we will call in continuous glucose monitor dexcom. He agrees to call us if you receive and we will set up education.   -- preciously given information on how to correct blood sugars in order to not increase blood sugar above goal  -- previously given information on low carb snacks and drinks  -- Reviewed goals of therapy are to get the best control we can without hypoglycemia  -- Reviewed patient's current insulin regimen. Clarified proper insulin dose and timing in relation to meals, etc. Insulin injection sites and proper rotation instructed.    -- Advised frequent self blood glucose monitoring.  Patient encouraged to document glucose results and bring them to every clinic visit    -- Hypoglycemia precautions discussed. Instructed on precautions before driving.    -- Call for Bg repeatedly < 90 or > 180.   -- Close adherence to lifestyle changes recommended.   -- Periodic follow ups for eye evaluations, foot care and dental care suggested.        Weight loss  May be related to hyperglycemia  Have advised to check blood sugars and use insulin.    Hyperlipidemia associated with type 2 diabetes mellitus  Controlled   On statin per ADA recommendations      Hypertension associated with diabetes  -- not on ACEI ARB-defer to renal   -- Controlled  -- Blood pressure goals discussed with patient        Follow up in about 4 weeks (around 10/14/2020).

## 2020-09-16 ENCOUNTER — OFFICE VISIT (OUTPATIENT)
Dept: ENDOCRINOLOGY | Facility: CLINIC | Age: 65
End: 2020-09-16
Payer: MEDICARE

## 2020-09-16 VITALS
WEIGHT: 247.38 LBS | SYSTOLIC BLOOD PRESSURE: 140 MMHG | HEIGHT: 67 IN | BODY MASS INDEX: 38.83 KG/M2 | DIASTOLIC BLOOD PRESSURE: 70 MMHG

## 2020-09-16 DIAGNOSIS — Z79.4 TYPE 2 DIABETES MELLITUS WITH OTHER SPECIFIED COMPLICATION, WITH LONG-TERM CURRENT USE OF INSULIN: ICD-10-CM

## 2020-09-16 DIAGNOSIS — I15.2 HYPERTENSION ASSOCIATED WITH DIABETES: ICD-10-CM

## 2020-09-16 DIAGNOSIS — E11.69 TYPE 2 DIABETES MELLITUS WITH OTHER SPECIFIED COMPLICATION, WITH LONG-TERM CURRENT USE OF INSULIN: ICD-10-CM

## 2020-09-16 DIAGNOSIS — E11.69 HYPERLIPIDEMIA ASSOCIATED WITH TYPE 2 DIABETES MELLITUS: ICD-10-CM

## 2020-09-16 DIAGNOSIS — R63.4 WEIGHT LOSS: ICD-10-CM

## 2020-09-16 DIAGNOSIS — E11.59 HYPERTENSION ASSOCIATED WITH DIABETES: ICD-10-CM

## 2020-09-16 DIAGNOSIS — E11.69 TYPE 2 DIABETES MELLITUS WITH OTHER SPECIFIED COMPLICATION, WITH LONG-TERM CURRENT USE OF INSULIN: Primary | ICD-10-CM

## 2020-09-16 DIAGNOSIS — E78.5 HYPERLIPIDEMIA ASSOCIATED WITH TYPE 2 DIABETES MELLITUS: ICD-10-CM

## 2020-09-16 DIAGNOSIS — Z79.4 TYPE 2 DIABETES MELLITUS WITH OTHER SPECIFIED COMPLICATION, WITH LONG-TERM CURRENT USE OF INSULIN: Primary | ICD-10-CM

## 2020-09-16 PROCEDURE — 3008F BODY MASS INDEX DOCD: CPT | Mod: CPTII,S$GLB,, | Performed by: NURSE PRACTITIONER

## 2020-09-16 PROCEDURE — 99214 PR OFFICE/OUTPT VISIT, EST, LEVL IV, 30-39 MIN: ICD-10-PCS | Mod: S$GLB,,, | Performed by: NURSE PRACTITIONER

## 2020-09-16 PROCEDURE — 3078F PR MOST RECENT DIASTOLIC BLOOD PRESSURE < 80 MM HG: ICD-10-PCS | Mod: CPTII,S$GLB,, | Performed by: NURSE PRACTITIONER

## 2020-09-16 PROCEDURE — 3077F PR MOST RECENT SYSTOLIC BLOOD PRESSURE >= 140 MM HG: ICD-10-PCS | Mod: CPTII,S$GLB,, | Performed by: NURSE PRACTITIONER

## 2020-09-16 PROCEDURE — 1159F MED LIST DOCD IN RCRD: CPT | Mod: S$GLB,,, | Performed by: NURSE PRACTITIONER

## 2020-09-16 PROCEDURE — 3046F HEMOGLOBIN A1C LEVEL >9.0%: CPT | Mod: CPTII,S$GLB,, | Performed by: NURSE PRACTITIONER

## 2020-09-16 PROCEDURE — 99999 PR PBB SHADOW E&M-EST. PATIENT-LVL IV: CPT | Mod: PBBFAC,,, | Performed by: NURSE PRACTITIONER

## 2020-09-16 PROCEDURE — 99214 OFFICE O/P EST MOD 30 MIN: CPT | Mod: S$GLB,,, | Performed by: NURSE PRACTITIONER

## 2020-09-16 PROCEDURE — 3008F PR BODY MASS INDEX (BMI) DOCUMENTED: ICD-10-PCS | Mod: CPTII,S$GLB,, | Performed by: NURSE PRACTITIONER

## 2020-09-16 PROCEDURE — 1159F PR MEDICATION LIST DOCUMENTED IN MEDICAL RECORD: ICD-10-PCS | Mod: S$GLB,,, | Performed by: NURSE PRACTITIONER

## 2020-09-16 PROCEDURE — 99999 PR PBB SHADOW E&M-EST. PATIENT-LVL IV: ICD-10-PCS | Mod: PBBFAC,,, | Performed by: NURSE PRACTITIONER

## 2020-09-16 PROCEDURE — 3078F DIAST BP <80 MM HG: CPT | Mod: CPTII,S$GLB,, | Performed by: NURSE PRACTITIONER

## 2020-09-16 PROCEDURE — 3077F SYST BP >= 140 MM HG: CPT | Mod: CPTII,S$GLB,, | Performed by: NURSE PRACTITIONER

## 2020-09-16 PROCEDURE — 3046F PR MOST RECENT HEMOGLOBIN A1C LEVEL > 9.0%: ICD-10-PCS | Mod: CPTII,S$GLB,, | Performed by: NURSE PRACTITIONER

## 2020-09-16 RX ORDER — BLOOD-GLUCOSE SENSOR
3 EACH MISCELLANEOUS CONTINUOUS PRN
Qty: 3 EACH | Status: SHIPPED | OUTPATIENT
Start: 2020-09-16 | End: 2020-09-17 | Stop reason: SDUPTHER

## 2020-09-16 RX ORDER — INSULIN PUMP SYRINGE, 3 ML
EACH MISCELLANEOUS
Qty: 1 EACH | Refills: 0 | Status: SHIPPED | OUTPATIENT
Start: 2020-09-16 | End: 2022-06-08

## 2020-09-16 RX ORDER — BLOOD-GLUCOSE TRANSMITTER
1 EACH MISCELLANEOUS CONTINUOUS PRN
Qty: 1 EACH | Status: SHIPPED | OUTPATIENT
Start: 2020-09-16 | End: 2020-09-17 | Stop reason: SDUPTHER

## 2020-09-16 RX ORDER — LANCETS
EACH MISCELLANEOUS
Qty: 100 EACH | Refills: 3 | Status: SHIPPED | OUTPATIENT
Start: 2020-09-16 | End: 2022-06-14

## 2020-09-16 NOTE — ASSESSMENT & PLAN NOTE
Complicated by neuropathy, non compliance and fear of low blood sugars    Medication Changes:    Continue current Vgo 20-3 clicks with meals and 1 click with snack   We will check labs in one month   We will try for Dexcom -continuous glucose monitor. Message me and we will schedule diabetes education so you can learn how to apply     -- Considering BGs in 40's in the past coupled with fear of hypoglycemia, we will call in continuous glucose monitor dexcom. He agrees to call us if you receive and we will set up education.   -- preciously given information on how to correct blood sugars in order to not increase blood sugar above goal  -- previously given information on low carb snacks and drinks  -- Reviewed goals of therapy are to get the best control we can without hypoglycemia  -- Reviewed patient's current insulin regimen. Clarified proper insulin dose and timing in relation to meals, etc. Insulin injection sites and proper rotation instructed.    -- Advised frequent self blood glucose monitoring.  Patient encouraged to document glucose results and bring them to every clinic visit    -- Hypoglycemia precautions discussed. Instructed on precautions before driving.    -- Call for Bg repeatedly < 90 or > 180.   -- Close adherence to lifestyle changes recommended.   -- Periodic follow ups for eye evaluations, foot care and dental care suggested.

## 2020-09-16 NOTE — PATIENT INSTRUCTIONS
Diabetes   Continue current Vgo 20-3 clicks with meals and 1 click with snack   We will check labs in one month   We will try for Dexcom -continuous glucose monitor. Message me and we will schedule diabetes education so you can learn how to apply

## 2020-09-17 DIAGNOSIS — Z79.4 TYPE 2 DIABETES MELLITUS WITH OTHER SPECIFIED COMPLICATION, WITH LONG-TERM CURRENT USE OF INSULIN: ICD-10-CM

## 2020-09-17 DIAGNOSIS — E11.69 TYPE 2 DIABETES MELLITUS WITH OTHER SPECIFIED COMPLICATION, WITH LONG-TERM CURRENT USE OF INSULIN: ICD-10-CM

## 2020-09-17 RX ORDER — BLOOD-GLUCOSE TRANSMITTER
1 EACH MISCELLANEOUS CONTINUOUS PRN
Qty: 1 EACH | Status: SHIPPED | OUTPATIENT
Start: 2020-09-17 | End: 2020-12-22 | Stop reason: SDUPTHER

## 2020-09-17 RX ORDER — BLOOD-GLUCOSE SENSOR
3 EACH MISCELLANEOUS CONTINUOUS PRN
Qty: 3 EACH | Status: SHIPPED | OUTPATIENT
Start: 2020-09-17 | End: 2020-12-23 | Stop reason: SDUPTHER

## 2020-09-21 LAB
LEFT EYE DM RETINOPATHY: NEGATIVE
RIGHT EYE DM RETINOPATHY: NEGATIVE

## 2020-09-28 ENCOUNTER — LAB VISIT (OUTPATIENT)
Dept: LAB | Facility: HOSPITAL | Age: 65
End: 2020-09-28
Attending: INTERNAL MEDICINE
Payer: MEDICARE

## 2020-09-28 DIAGNOSIS — N18.30 CKD (CHRONIC KIDNEY DISEASE) STAGE 3, GFR 30-59 ML/MIN: ICD-10-CM

## 2020-09-28 DIAGNOSIS — N18.4 CKD (CHRONIC KIDNEY DISEASE) STAGE 4, GFR 15-29 ML/MIN: ICD-10-CM

## 2020-09-28 PROCEDURE — 36415 COLL VENOUS BLD VENIPUNCTURE: CPT | Mod: PO

## 2020-09-28 PROCEDURE — 82040 ASSAY OF SERUM ALBUMIN: CPT

## 2020-09-28 PROCEDURE — 84100 ASSAY OF PHOSPHORUS: CPT

## 2020-09-28 PROCEDURE — 83735 ASSAY OF MAGNESIUM: CPT

## 2020-09-28 PROCEDURE — 80048 BASIC METABOLIC PNL TOTAL CA: CPT

## 2020-09-29 LAB
ALBUMIN SERPL BCP-MCNC: 3.2 G/DL (ref 3.5–5.2)
ANION GAP SERPL CALC-SCNC: 11 MMOL/L (ref 8–16)
BUN SERPL-MCNC: 19 MG/DL (ref 8–23)
CALCIUM SERPL-MCNC: 8.4 MG/DL (ref 8.7–10.5)
CHLORIDE SERPL-SCNC: 110 MMOL/L (ref 95–110)
CO2 SERPL-SCNC: 21 MMOL/L (ref 23–29)
CREAT SERPL-MCNC: 1.7 MG/DL (ref 0.5–1.4)
EST. GFR  (AFRICAN AMERICAN): 47.5 ML/MIN/1.73 M^2
EST. GFR  (NON AFRICAN AMERICAN): 41.1 ML/MIN/1.73 M^2
GLUCOSE SERPL-MCNC: 134 MG/DL (ref 70–110)
MAGNESIUM SERPL-MCNC: 1.8 MG/DL (ref 1.6–2.6)
PHOSPHATE SERPL-MCNC: 4 MG/DL (ref 2.7–4.5)
POTASSIUM SERPL-SCNC: 4.6 MMOL/L (ref 3.5–5.1)
SODIUM SERPL-SCNC: 142 MMOL/L (ref 136–145)

## 2020-10-04 ENCOUNTER — PATIENT OUTREACH (OUTPATIENT)
Dept: ADMINISTRATIVE | Facility: OTHER | Age: 65
End: 2020-10-04

## 2020-10-05 ENCOUNTER — CLINICAL SUPPORT (OUTPATIENT)
Dept: DIABETES | Facility: CLINIC | Age: 65
End: 2020-10-05
Payer: MEDICARE

## 2020-10-05 DIAGNOSIS — Z79.4 TYPE 2 DIABETES MELLITUS WITH OTHER SPECIFIED COMPLICATION, WITH LONG-TERM CURRENT USE OF INSULIN: ICD-10-CM

## 2020-10-05 DIAGNOSIS — E11.69 TYPE 2 DIABETES MELLITUS WITH OTHER SPECIFIED COMPLICATION, WITH LONG-TERM CURRENT USE OF INSULIN: ICD-10-CM

## 2020-10-05 PROCEDURE — G0108 PR DIAB MANAGE TRN  PER INDIV: ICD-10-PCS | Mod: S$GLB,,, | Performed by: INTERNAL MEDICINE

## 2020-10-05 PROCEDURE — 95249 CONT GLUC MNTR PT PROV EQP: CPT | Mod: S$GLB,,, | Performed by: INTERNAL MEDICINE

## 2020-10-05 PROCEDURE — 99999 PR PBB SHADOW E&M-EST. PATIENT-LVL I: ICD-10-PCS | Mod: PBBFAC,,,

## 2020-10-05 PROCEDURE — 99999 PR PBB SHADOW E&M-EST. PATIENT-LVL I: CPT | Mod: PBBFAC,,,

## 2020-10-05 PROCEDURE — G0108 DIAB MANAGE TRN  PER INDIV: HCPCS | Mod: S$GLB,,, | Performed by: INTERNAL MEDICINE

## 2020-10-05 PROCEDURE — 95249 PR GLUCOSE MONITORING, 72 HRS, SUB-Q SENSOR, PATIENT PROVIDED: ICD-10-PCS | Mod: S$GLB,,, | Performed by: INTERNAL MEDICINE

## 2020-10-05 NOTE — PROGRESS NOTES
Diabetes Education  Author: Melissa Silver RD, CDE  Date: 10/5/2020    Diabetes Care Management Summary  Diabetes Education Record Progress: Comprehensive   (Dexcom G6 training)    Current Diabetes Risk Level: High     Last A1c:   Lab Results   Component Value Date    HGBA1C 10.0 (H) 07/21/2020     Last Visit with Diabetes Educator:  8/31/2020    Last OPCM Referral: : 01/30/2019   Enrolled in OPC: No    Diabetes Type : Type II  Diabetes Diagnosis: >10 years      Current Treatment: Insulin  Novolog via VGo 20 - taking 1-3 clicks with meals depending on meal size and BG            Reviewed Problem List with Patient: Yes    Health Maintenance was reviewed today with patient. Discussed with patient importance of routine eye exams, foot exams/foot care, blood work (i.e.: A1c, microalbumin, and lipid), dental visits, yearly flu vaccine, and pneumonia vaccine as indicated by PCP. Patient verbalized understanding.     Health Maintenance Topics with due status: Not Due       Topic Last Completion Date    TETANUS VACCINE 03/13/2017    Lipid Panel 02/21/2020    Eye Exam 05/15/2020    Hemoglobin A1c 07/21/2020     Health Maintenance Due   Topic Date Due    Shingles Vaccine (1 of 2) 08/19/2004    Pneumococcal Vaccine (65+ Low/Medium Risk) (1 of 2 - PCV13) 08/19/2019    Colorectal Cancer Screening  10/06/2020    Foot Exam  11/15/2020       Nutrition  Meal Planning: (2-3 meals daily; still missing top row of teeth (after oral surgery) and eating mostly soft/liquid foods)      Monitoring   Self Monitoring : SMBG 1-3 times daily  Blood Glucose Logs: Yes   (FBG ; pre-prandial )    Do you use a personal continuous glucose monitor?: No  (training on Dexcom today)    In the last month, how often have you had a low blood sugar reaction?: more than once a week  What are your symptoms of low blood sugar?: feels weak with BG <90  How do you treat low blood sugar?: juice      Exercise   Exercise Type: none      Social  "History  Preferred Learning Method: Face to Face, Demonstration, Hands On, Reading Materials  Primary Support: Self, Spouse  Smoking Status: Ex Smoker  Barriers to Change: None  Learning Challenges : None  Readiness to Learn : Acceptance  Cultural Influences: No          Diabetes Education Assessment/Progress  Medications (states correct name, dose, onset, peak, duration, side effects & timing of meds): Discussion, Instructed, Individual Session, Written Materials Provided, Comprehends Key Points, Return Demonstration, Demonstrates Understanding/Competency(verbalizes/demonstrates)  Monitoring (monitoring blood glucose/other parameters & using results): Discussion, Instructed, Individual Session, Comprehends Key Points, Demonstration, Return Demonstration, Written Materials Provided  Acute Complications (preventing, detecting, and treating acute complications): Discussion, Instructed, Individual Session, Comprehends Key Points    DEXCOM G6 CGM TRAINING     Patient referred to clinic today for Dexcom G6 continuous glucose sensor system training.  Patient arrived with Dexcom G6 mobile tatyana downloaded to phone. Education was provided using "Quick Start Guide" per Dexcom protocol.     Pt will be using his phone as the primary .  § Overview:  5min glucose reading updates, trending arrows, BG graph screens, battery life indicator, Blue Tooth Symbol.  § Menus: trend Graph, start sensor, enter BG, events, Alerts, Settings, Shutdown, Stop Sensor  §  Settings:                          * Urgent Low: 55 mg/dL                          * Urgent Low Soon: on                          * Low alert: 90 mg/dL                          * High alert: 300 mg/dL                          * Rise rate: off                          * Fall Rate: off                          * Signal Loss: off                          * No Reading: off                          * Always sound: on     · Reviewed additional setting options with " patient, including Graph Height and Transmitter ID. Transmitter was paired with .    · Reviewed where to find sensor insertion time and sensor expiration date.   · Discussed no need to calibrate sensor during the entirety of the 10 day wear. Discussed that pt can calibrate sensor if desired, but at that time she will need to continue calibrating every 12 hours for sensor to remain accurate. Reviewed appropriate calibration techniques.  · Reviewed sensor site selection. Site selected and prepped using aseptic technique Inserted to abdomen. Transmitter placed in pod and secured.  · Practiced sensor pod/transmitter removal from site, and removal of transmitter from sensor pod.  · Patient able to demonstrate without difficulty.  Encouraged to review manual prior to starting another sensor.   · Reviewed problem solving aspects of sensor transmission/ variables that can disrupt RF transmission.  range 20 feet, but the first 3 hrs keep within 3 feet of transmitter.  · Pt instructed on lag time of interstitial fluid from CBG and was advised to tx hypoglycemia and dose insulin based on SMBG values.  · Dexcom technical support contact number given and examples of when to contact them discussed. Clarity tatyana downloaded and linked to our clinic account.           Goals  Patient has selected/evaluated goals during today's session: Yes, evaluated    Medications: In Progress  (Change VGo at same time every day and give 3 clicks with every meal as instructed)  Start Date: 08/18/20  Target Date: 11/30/20         Diabetes Care Plan/Intervention  Education Plan/Intervention: Individual Follow-Up DSMT  (f/u with me in 2 weeks)        Diabetes Meal Plan  Restrictions: Restricted Carbohydrate        Today's Self-Management Care Plan was developed with the patient's input and is based on barriers identified during today's assessment.    The long and short-term goals in the care plan were written with the patient/caregiver's  input. The patient has agreed to work toward these goals to improve his overall diabetes control.      The patient received a copy of today's self-management plan and verbalized understanding of the care plan, goals, and all of today's instructions.      The patient was encouraged to communicate with his physician and care team regarding his condition(s) and treatment.  I provided the patient with my contact information today and encouraged him to contact me via phone or patient portal as needed.           Education Units of Time   Time Spent: 60 min

## 2020-10-09 ENCOUNTER — TELEPHONE (OUTPATIENT)
Dept: ENDOCRINOLOGY | Facility: CLINIC | Age: 65
End: 2020-10-09

## 2020-10-09 NOTE — TELEPHONE ENCOUNTER
Called patient to discuss logs. Left message to remember to click Vgo before every meal and with snacks. Will download dexcom again in 2 weeks.

## 2020-10-14 ENCOUNTER — LAB VISIT (OUTPATIENT)
Dept: LAB | Facility: HOSPITAL | Age: 65
End: 2020-10-14
Attending: NURSE PRACTITIONER
Payer: MEDICARE

## 2020-10-14 DIAGNOSIS — Z79.4 TYPE 2 DIABETES MELLITUS WITH OTHER SPECIFIED COMPLICATION, WITH LONG-TERM CURRENT USE OF INSULIN: ICD-10-CM

## 2020-10-14 DIAGNOSIS — E11.69 TYPE 2 DIABETES MELLITUS WITH OTHER SPECIFIED COMPLICATION, WITH LONG-TERM CURRENT USE OF INSULIN: ICD-10-CM

## 2020-10-14 LAB
ALBUMIN SERPL BCP-MCNC: 3.3 G/DL (ref 3.5–5.2)
ALP SERPL-CCNC: 156 U/L (ref 55–135)
ALT SERPL W/O P-5'-P-CCNC: 50 U/L (ref 10–44)
ANION GAP SERPL CALC-SCNC: 10 MMOL/L (ref 8–16)
AST SERPL-CCNC: 49 U/L (ref 10–40)
BILIRUB SERPL-MCNC: 0.3 MG/DL (ref 0.1–1)
BUN SERPL-MCNC: 18 MG/DL (ref 8–23)
CALCIUM SERPL-MCNC: 8.1 MG/DL (ref 8.7–10.5)
CHLORIDE SERPL-SCNC: 111 MMOL/L (ref 95–110)
CO2 SERPL-SCNC: 22 MMOL/L (ref 23–29)
CREAT SERPL-MCNC: 1.8 MG/DL (ref 0.5–1.4)
EST. GFR  (AFRICAN AMERICAN): 44.3 ML/MIN/1.73 M^2
EST. GFR  (NON AFRICAN AMERICAN): 38.4 ML/MIN/1.73 M^2
GLUCOSE SERPL-MCNC: 129 MG/DL (ref 70–110)
POTASSIUM SERPL-SCNC: 4.6 MMOL/L (ref 3.5–5.1)
PROT SERPL-MCNC: 5.8 G/DL (ref 6–8.4)
SODIUM SERPL-SCNC: 143 MMOL/L (ref 136–145)

## 2020-10-14 PROCEDURE — 83036 HEMOGLOBIN GLYCOSYLATED A1C: CPT

## 2020-10-14 PROCEDURE — 36415 COLL VENOUS BLD VENIPUNCTURE: CPT | Mod: PO

## 2020-10-14 PROCEDURE — 80053 COMPREHEN METABOLIC PANEL: CPT

## 2020-10-15 ENCOUNTER — PATIENT MESSAGE (OUTPATIENT)
Dept: DIABETES | Facility: CLINIC | Age: 65
End: 2020-10-15

## 2020-10-15 ENCOUNTER — PATIENT OUTREACH (OUTPATIENT)
Dept: DIABETES | Facility: CLINIC | Age: 65
End: 2020-10-15

## 2020-10-15 ENCOUNTER — PATIENT MESSAGE (OUTPATIENT)
Dept: ENDOCRINOLOGY | Facility: CLINIC | Age: 65
End: 2020-10-15

## 2020-10-15 LAB
ESTIMATED AVG GLUCOSE: 217 MG/DL (ref 68–131)
HBA1C MFR BLD HPLC: 9.2 % (ref 4–5.6)

## 2020-10-15 NOTE — PROGRESS NOTES
Pt called needing help changing his Dexcom sensor.   Walked him through how to change sensor. Warm up period started successfully.

## 2020-10-20 NOTE — PROGRESS NOTES
Subjective:      Patient ID: Jian Arrieta is a 65 y.o. male.    Chief Complaint:  Diabetes    History of Present Illness  Pt returns for follow up of type 2 diabetes complicated by Charcot foot, foot ulcer-now resolved, BMI 39, CKD stage III, s/p sleeve gastrectomy that is uncontrolled and complicated.  Previously seen by Dr. Carney and Dr. Stauffer and myself. Last visit in Sept 2020. He is here today for diabetes assessment and medication assessment and adjustment.    He had all of top teeth removed and he is still doing soft diet. He will have dentures made.     Needs A1c <8 for hydrocele surgery     With regards to the diabetes:    Diagnosed with Type 2 DM in 2016  Episode of pancreatitis around 2018, portal vein thrombosis and underwent angioplasty.   Family History of Type 2 DM: none  Known complications:  DKA/HHS: none  RN: none; note reviewed from 11/2019  PN: +; was seeing podiatry in the past  Nephropathy: + sees nephrology  Gastroparesis: none  CAD: CABG around 2016  Diabetes complications: CKD stage III, s/p sleeve gastrectomy    Current regimen:  Vgo 20 -  2 clicks with meals if BG is 150  3 clicks if blood sugar is 250  4 clicks if blood sugar is above 300  1-2 click with a dessert    Missed doses-missing clicks      Other medications tried:  Metformin    Wearing Dexcom today. See media tab.   He is forgetting to click at times. On days that he forgets to click, his blood sugars stay persistently high. His blood sugars come down nicely when he clicks. He is having overnight/early morning hypoglycemia.     Fasting:    Patient feels unwell with blood sugars less than 110. He loses ability to function with low blood sugars. His lowest blood sugar has been in the 40s (around Jan 2020). Seen at [facility].   Hypoglycemia once since last visit. BG of 67.     Dietary recall: He is not following diabetic diet. Appetite is good. Does not eat as much as before.   Eats 3 meals a day.   B: 5AM-waffle  house  L: 11:30  D: 7PM  Snacks: Grazing during the day  Drinks: tea and water    Exercise - tried to stay active. No formal exercise. Active in the yard, garage.      Education - last visit: 8/31/2020 -EZEQUIEL Palacios/Baqsimi: has    Diabetes Management Status  Statin: Not taking  ACE/ARB: Not taking    Lab Results   Component Value Date    HGBA1C 9.2 (H) 10/14/2020    HGBA1C 10.0 (H) 07/21/2020    HGBA1C 9.4 (H) 04/20/2020     Screening or Prevention Patient's value Goal Complete/Controlled?   HgA1C Testing and Control   Lab Results   Component Value Date    HGBA1C 9.2 (H) 10/14/2020      Annually/Less than 8% No   Lipid profile : 02/21/2020 Annually Yes   LDL control Lab Results   Component Value Date    LDLCALC 76.4 02/21/2020    Annually/Less than 100 mg/dl  Yes   Nephropathy screening Lab Results   Component Value Date    LABMICR 113.0 02/27/2020     Lab Results   Component Value Date    PROTEINUA Trace (A) 05/22/2020    Annually Yes   Blood pressure BP Readings from Last 1 Encounters:   10/21/20 (!) 145/80    Less than 140/90 Yes   Dilated retinal exam : 05/15/2020 Annually Yes   Foot exam   : 11/15/2019 Annually Yes     Review of Systems   Constitutional: Negative for fatigue.   HENT: Positive for hearing loss.    Eyes: Positive for visual disturbance.   Respiratory: Negative for shortness of breath.    Cardiovascular: Negative for chest pain.   Gastrointestinal: Negative for abdominal pain.   Musculoskeletal: Negative for arthralgias.   Skin: Negative for wound.   Neurological: Negative for headaches.   Hematological: Does not bruise/bleed easily.   Psychiatric/Behavioral: Negative for sleep disturbance.       Objective:   Physical Exam  Vitals signs reviewed.   Constitutional:       Appearance: He is well-developed.   Neck:      Thyroid: No thyromegaly.   Cardiovascular:      Rate and Rhythm: Normal rate.   Pulmonary:      Effort: Pulmonary effort is normal.   Abdominal:      Palpations: Abdomen is  "soft.   Musculoskeletal:      Right lower leg: Edema present.      Left lower leg: Edema present.     Vgo and dexcom sites are without edema or erythema. No lipo hypertropthy or atrophy  Diabetes Foot Exam:   Denies sores or lesions to bilateral feet  Shoes appropriate  DM foot exam deferred, done in Nov 2019    Vitals:    10/21/20 1531   BP: (!) 145/80   Weight: 117.8 kg (259 lb 11.2 oz)   Height: 5' 7" (1.702 m)       BP Readings from Last 3 Encounters:   10/21/20 (!) 145/80   09/29/20 120/65   09/16/20 (!) 140/70     Wt Readings from Last 1 Encounters:   10/21/20 1531 117.8 kg (259 lb 11.2 oz)     Body mass index is 40.68 kg/m².    Lab Review:   Lab Results   Component Value Date    HGBA1C 9.2 (H) 10/14/2020     Lab Results   Component Value Date    CHOL 143 02/21/2020    HDL 49 02/21/2020    LDLCALC 76.4 02/21/2020    TRIG 88 02/21/2020    CHOLHDL 34.3 02/21/2020     Lab Results   Component Value Date     10/14/2020    K 4.6 10/14/2020     (H) 10/14/2020    CO2 22 (L) 10/14/2020     (H) 10/14/2020    BUN 18 10/14/2020    CREATININE 1.8 (H) 10/14/2020    CALCIUM 8.1 (L) 10/14/2020    PROT 5.8 (L) 10/14/2020    ALBUMIN 3.3 (L) 10/14/2020    BILITOT 0.3 10/14/2020    ALKPHOS 156 (H) 10/14/2020    AST 49 (H) 10/14/2020    ALT 50 (H) 10/14/2020    ANIONGAP 10 10/14/2020    ESTGFRAFRICA 44.3 (A) 10/14/2020    EGFRNONAA 38.4 (A) 10/14/2020    TSH 2.211 01/28/2019       Assessment and Plan     1. Type 2 diabetes mellitus with other specified complication, with long-term current use of insulin     2. Weight loss     3. Hypertension associated with diabetes     4. Hyperlipidemia associated with type 2 diabetes mellitus       Type 2 diabetes mellitus, with long-term current use of insulin  Complicated by neuropathy, non compliance and fear of low blood sugars  Dexcom reviewed: He is forgetting to click at times. On days that he forgets to click, his blood sugars stay persistently high. His blood sugars " come down nicely when he clicks. He is having overnight/early morning hypoglycemia.   Medication Changes:   Continue  Vgo 20 -  2 clicks with meals if BG is 150  3 clicks if blood sugar is 250  4 clicks if blood sugar is above 300  1-2 click with a dessert  Instructed how to log clicks on dexcom.   Take off Vgo at night to prevent low blood sugars. Will download dexcom in 2 weeks to see if this helps. He agrees to make food diary for next visit.    Definitely continue dexcom as he knows his blood sugars. However, I fear he is correcting blood sugar of 90 as he will not let me change lower limit below 90 as he states that he feels poorly at 85.     -- Considering BGs in 40's in the past coupled with fear of hypoglycemia, we will continue with continuous  glucose monitor dexcom.   -- previously given information on how to correct blood sugars in order to not increase blood sugar above goal  -- previously given information on low carb snacks and drinks  -- Reviewed goals of therapy are to get the best control we can without hypoglycemia  -- Reviewed patient's current insulin regimen. Clarified proper insulin dose and timing in relation to meals, etc. Insulin injection sites and proper rotation instructed.    -- Advised frequent self blood glucose monitoring.  Patient encouraged to document glucose results and bring them to every clinic visit    -- Hypoglycemia precautions discussed. Instructed on precautions before driving.    -- Call for Bg repeatedly < 90 or > 180.   -- Close adherence to lifestyle changes recommended.   -- Periodic follow ups for eye evaluations, foot care and dental care suggested.        Weight loss  May be related to hyperglycemia  Have advised to check blood sugars and use insulin.    Hypertension associated with diabetes  -- not on ACEI ARB-defer to renal   -- Controlled  -- Blood pressure goals discussed with patient      Hyperlipidemia associated with type 2 diabetes mellitus  Controlled   On  statin per ADA recommendations        Follow up in about 4 weeks (around 11/18/2020).

## 2020-10-21 ENCOUNTER — OFFICE VISIT (OUTPATIENT)
Dept: ENDOCRINOLOGY | Facility: CLINIC | Age: 65
End: 2020-10-21
Payer: MEDICARE

## 2020-10-21 VITALS
BODY MASS INDEX: 40.76 KG/M2 | WEIGHT: 259.69 LBS | DIASTOLIC BLOOD PRESSURE: 80 MMHG | HEIGHT: 67 IN | SYSTOLIC BLOOD PRESSURE: 145 MMHG

## 2020-10-21 DIAGNOSIS — Z79.4 TYPE 2 DIABETES MELLITUS WITH OTHER SPECIFIED COMPLICATION, WITH LONG-TERM CURRENT USE OF INSULIN: ICD-10-CM

## 2020-10-21 DIAGNOSIS — I15.2 HYPERTENSION ASSOCIATED WITH DIABETES: ICD-10-CM

## 2020-10-21 DIAGNOSIS — E11.69 HYPERLIPIDEMIA ASSOCIATED WITH TYPE 2 DIABETES MELLITUS: ICD-10-CM

## 2020-10-21 DIAGNOSIS — E11.59 HYPERTENSION ASSOCIATED WITH DIABETES: ICD-10-CM

## 2020-10-21 DIAGNOSIS — E11.69 TYPE 2 DIABETES MELLITUS WITH OTHER SPECIFIED COMPLICATION, WITH LONG-TERM CURRENT USE OF INSULIN: ICD-10-CM

## 2020-10-21 DIAGNOSIS — R63.4 WEIGHT LOSS: ICD-10-CM

## 2020-10-21 DIAGNOSIS — E78.5 HYPERLIPIDEMIA ASSOCIATED WITH TYPE 2 DIABETES MELLITUS: ICD-10-CM

## 2020-10-21 PROCEDURE — 99214 PR OFFICE/OUTPT VISIT, EST, LEVL IV, 30-39 MIN: ICD-10-PCS | Mod: 25,S$GLB,, | Performed by: NURSE PRACTITIONER

## 2020-10-21 PROCEDURE — 3046F HEMOGLOBIN A1C LEVEL >9.0%: CPT | Mod: CPTII,S$GLB,, | Performed by: NURSE PRACTITIONER

## 2020-10-21 PROCEDURE — 3077F PR MOST RECENT SYSTOLIC BLOOD PRESSURE >= 140 MM HG: ICD-10-PCS | Mod: CPTII,S$GLB,, | Performed by: NURSE PRACTITIONER

## 2020-10-21 PROCEDURE — 1126F PR PAIN SEVERITY QUANTIFIED, NO PAIN PRESENT: ICD-10-PCS | Mod: S$GLB,,, | Performed by: NURSE PRACTITIONER

## 2020-10-21 PROCEDURE — 99999 PR PBB SHADOW E&M-EST. PATIENT-LVL IV: CPT | Mod: PBBFAC,,, | Performed by: NURSE PRACTITIONER

## 2020-10-21 PROCEDURE — 1159F MED LIST DOCD IN RCRD: CPT | Mod: S$GLB,,, | Performed by: NURSE PRACTITIONER

## 2020-10-21 PROCEDURE — 3008F PR BODY MASS INDEX (BMI) DOCUMENTED: ICD-10-PCS | Mod: CPTII,S$GLB,, | Performed by: NURSE PRACTITIONER

## 2020-10-21 PROCEDURE — 3008F BODY MASS INDEX DOCD: CPT | Mod: CPTII,S$GLB,, | Performed by: NURSE PRACTITIONER

## 2020-10-21 PROCEDURE — 99999 PR PBB SHADOW E&M-EST. PATIENT-LVL IV: ICD-10-PCS | Mod: PBBFAC,,, | Performed by: NURSE PRACTITIONER

## 2020-10-21 PROCEDURE — 95251 PR GLUCOSE MONITOR, 72 HOUR, PHYS INTERP: ICD-10-PCS | Mod: S$GLB,,, | Performed by: NURSE PRACTITIONER

## 2020-10-21 PROCEDURE — 3046F PR MOST RECENT HEMOGLOBIN A1C LEVEL > 9.0%: ICD-10-PCS | Mod: CPTII,S$GLB,, | Performed by: NURSE PRACTITIONER

## 2020-10-21 PROCEDURE — 1101F PR PT FALLS ASSESS DOC 0-1 FALLS W/OUT INJ PAST YR: ICD-10-PCS | Mod: CPTII,S$GLB,, | Performed by: NURSE PRACTITIONER

## 2020-10-21 PROCEDURE — 1126F AMNT PAIN NOTED NONE PRSNT: CPT | Mod: S$GLB,,, | Performed by: NURSE PRACTITIONER

## 2020-10-21 PROCEDURE — 1159F PR MEDICATION LIST DOCUMENTED IN MEDICAL RECORD: ICD-10-PCS | Mod: S$GLB,,, | Performed by: NURSE PRACTITIONER

## 2020-10-21 PROCEDURE — 99214 OFFICE O/P EST MOD 30 MIN: CPT | Mod: 25,S$GLB,, | Performed by: NURSE PRACTITIONER

## 2020-10-21 PROCEDURE — 3077F SYST BP >= 140 MM HG: CPT | Mod: CPTII,S$GLB,, | Performed by: NURSE PRACTITIONER

## 2020-10-21 PROCEDURE — 1101F PT FALLS ASSESS-DOCD LE1/YR: CPT | Mod: CPTII,S$GLB,, | Performed by: NURSE PRACTITIONER

## 2020-10-21 PROCEDURE — 3079F PR MOST RECENT DIASTOLIC BLOOD PRESSURE 80-89 MM HG: ICD-10-PCS | Mod: CPTII,S$GLB,, | Performed by: NURSE PRACTITIONER

## 2020-10-21 PROCEDURE — 95251 CONT GLUC MNTR ANALYSIS I&R: CPT | Mod: S$GLB,,, | Performed by: NURSE PRACTITIONER

## 2020-10-21 PROCEDURE — 3079F DIAST BP 80-89 MM HG: CPT | Mod: CPTII,S$GLB,, | Performed by: NURSE PRACTITIONER

## 2020-10-21 NOTE — PATIENT INSTRUCTIONS
Diabetes   Continue current clicks but starting logging clicks so you do not forget to click   Make me a food diary and bring in 2 weeks   Take Vgo off before bed and reapply in the AM.    Goal blood sugar is  fasting   Goal blood sugar 1 hour after meal is less than 180  Goal blood sugar 2 hour after meal is less than 140     Try clicking 10-15 minutes before meal

## 2020-10-23 ENCOUNTER — TELEPHONE (OUTPATIENT)
Dept: ENDOCRINOLOGY | Facility: CLINIC | Age: 65
End: 2020-10-23

## 2020-10-28 NOTE — TELEPHONE ENCOUNTER
Thanks  He can have the EGD and EUS guided liver biopsy at the same time, but these are only done at [facility]. The clinic visit will need to be on a separate day  Thanks
This pt is seeing Dr Hall at [facility] on 3/14/19 for f/u visit. An order has been placed by Aleshia Almonte NP in Hepatology for pt to have an egd for varices screen, pt has cirrhosis. Pt would like to see Dr Hall 1st before booking egd, he would also like Dr Hall to do the procedure and pt also stated he needs a liver biopsy and would like them all done on same day. He asked me to send this message out so everyone was aware of what he needs done.  
63

## 2020-12-01 ENCOUNTER — PATIENT OUTREACH (OUTPATIENT)
Dept: ADMINISTRATIVE | Facility: OTHER | Age: 65
End: 2020-12-01

## 2020-12-01 NOTE — PT/OT/SLP PROGRESS
Physical Therapy Treatment    Patient Name:  Jian Arrieta   MRN:  [id number]    Recommendations:     Discharge Recommendations:  nursing facility, skilled   Discharge Equipment Recommendations: (defer to SNF)   Barriers to discharge: decreased mobility,strength and endurance    Assessment:     Jian Arrieta is a 63 y.o. male admitted with a medical diagnosis of Portal hypertension.  He presents with the following impairments/functional limitations:  weakness, impaired endurance, impaired functional mobilty, gait instability, impaired balance, decreased lower extremity function, decreased ROM, impaired coordination, impaired skin,pt with good participation and progressing with mobility and endurance,pt requires SBA for safety with gait and will benefit from continuing PT services upon discharge.    Rehab Prognosis: Good; patient would benefit from acute skilled PT services to address these deficits and reach maximum level of function.    Recent Surgery: Procedure(s) (LRB):  ESOPHAGOGASTRODUODENOSCOPY (EGD) (N/A) 3 Days Post-Op    Plan:     During this hospitalization, patient to be seen 6 x/week to address the identified rehab impairments via gait training, therapeutic activities, therapeutic exercises and progress toward the following goals:    · Plan of Care Expires:  08/07/19    Subjective     Chief Complaint: n/a  Patient/Family Comments/goals: pt states his bowels were very messy this AM.  Pain/Comfort:  · Pain Rating 1: 0/10      Objective:     Communicated with nsg prior to session.  Patient found up in chair with telemetry(chair alarm) upon PT entry to room.     General Precautions: Standard, fall, hearing impaired   Orthopedic Precautions:N/A   Braces: N/A     Functional Mobility:  · Transfers:     · Sit to Stand:  stand by assistance with rolling walker  · Gait: amb ~70' X 2 with RW and SBA  · Balance: fair standing balance with RW      AM-PAC 6 CLICK MOBILITY  Turning over in bed (including adjusting  bedclothes, sheets and blankets)?: 4  Sitting down on and standing up from a chair with arms (e.g., wheelchair, bedside commode, etc.): 3  Moving from lying on back to sitting on the side of the bed?: 3  Moving to and from a bed to a chair (including a wheelchair)?: 3  Need to walk in hospital room?: 3  Climbing 3-5 steps with a railing?: 2  Basic Mobility Total Score: 18       Therapeutic Activities and Exercises: le seated ex's X 15 reps inc: ap,laq,hip flex,abd/add       Patient left up in chair with all lines intact, call button in reach and chair alarm on..    GOALS: see general POC  Multidisciplinary Problems     Physical Therapy Goals        Problem: Physical Therapy Goal    Goal Priority Disciplines Outcome Goal Variances Interventions   Physical Therapy Goal     PT, PT/OT Ongoing (interventions implemented as appropriate)     Description:  Goals to be met by: 2019    Patient will increase functional independence with mobility by performin. Sit to supine with Mod I  2. Sit to stand transfer with Mod I  3. Bed to chair transfer with Supervision using Rolling Walker  3. Gait  x50 feet with Supervision using Rolling Walker.   4. Lower extremity exercise program x12 reps per handout, with supervision  MET 19                         Time Tracking:     PT Received On: 19  PT Start Time: 1247     PT Stop Time: 1318  PT Total Time (min): 31 min     Billable Minutes: Gait Training 18 and Therapeutic Exercise 13    Treatment Type: Treatment  PT/PTA: PTA     PTA Visit Number: 4     Thom Reyes, PTA  2019   Please follow up with your doctor 14 days after your discharge from the hospital (call for appointment).  PT-weight bearing as tolerated Left Lower Extremity.  Aspirin 325 twice daily x 6 weeks total for dvt prevention.  Keep dressing clean and intact, have doctor remove staples/sutures post op day 14 (if applicable) and apply steristrips.  Please follow up with your PMD within 1 month for routine checkup.

## 2020-12-01 NOTE — PROGRESS NOTES
Care Everywhere: updated  Immunization: updated, links delay  Health Maintenance: updated  Media Review: review for outside colon cancer report   Legacy Review:   Order placed:   Upcoming appts:

## 2020-12-01 NOTE — PROGRESS NOTES
Subjective:      Patient ID: Jian Arrieta is a 65 y.o. male.    Chief Complaint:  Diabetes    History of Present Illness  Pt returns for follow up of type 2 diabetes complicated by Charcot foot, foot ulcer-now resolved, BMI 39, CKD stage III, s/p sleeve gastrectomy that is uncontrolled and complicated.  Previously seen by Dr. Carney and Dr. Stauffer and myself. Last visit in Oct 2020.    He had all of top teeth removed and he is still doing soft diet. He will have dentures made.     Had kidney stone this week  Needs A1c <8 for hydrocele surgery     With regards to the diabetes:    Diagnosed with Type 2 DM in 2016  Episode of pancreatitis around 2018, portal vein thrombosis and underwent angioplasty.   Family History of Type 2 DM: none  Known complications:  DKA/HHS: none  RN: none; note reviewed from 11/2019  PN: +; was seeing podiatry in the past  Nephropathy: + sees nephrology  Gastroparesis: none  CAD: CABG around 2016  Diabetes complications: CKD stage III, s/p sleeve gastrectomy    Current regimen:  Vgo 20 -  2 clicks with meals if BG is 150  3 clicks if blood sugar is 250  4 clicks if blood sugar is above 300  1-2 click with a dessert    He is giving clicks as he is eating. He is changing Vgo every 24 hours.     Missed doses-missing clicks    Other medications tried:  Metformin    Wearing Dexcom today. See media tab.   He is forgetting to click at times. On days that he forgets to click, his blood sugars stay persistently high. His blood sugars come down nicely when he clicks.             Fasting:    Patient feels unwell with blood sugars less than 110. He loses ability to function with low blood sugars. His lowest blood sugar has been in the 40s (around Jan 2020). Seen at [facility].     Dietary recall: He is not following diabetic diet. Appetite is good. Does not eat as much as before.   Eats 3 meals a day.   B: 5AM-waffle house or oatmeal or grits  L: 11:30  D: 7PM  Snacks: Grazing during the  day  Drinks: tea and water    Exercise - tried to stay active. No formal exercise. Active in the yard, garage.      Education - last visit: 8/31/2020 -EZEQUIEL Palacios/Baqsimi: has    Diabetes Management Status  Statin: Not taking  ACE/ARB: Not taking    Lab Results   Component Value Date    HGBA1C 9.2 (H) 10/14/2020    HGBA1C 10.0 (H) 07/21/2020    HGBA1C 9.4 (H) 04/20/2020     Screening or Prevention Patient's value Goal Complete/Controlled?   HgA1C Testing and Control   Lab Results   Component Value Date    HGBA1C 9.2 (H) 10/14/2020      Annually/Less than 8% No   Lipid profile : 02/21/2020 Annually Yes   LDL control Lab Results   Component Value Date    LDLCALC 76.4 02/21/2020    Annually/Less than 100 mg/dl  Yes   Nephropathy screening Lab Results   Component Value Date    LABMICR 113.0 02/27/2020     Lab Results   Component Value Date    PROTEINUA Trace (A) 05/22/2020    Annually Yes   Blood pressure BP Readings from Last 1 Encounters:   12/02/20 134/72    Less than 140/90 Yes   Dilated retinal exam : 05/15/2020 Annually Yes   Foot exam   : 12/02/2020 Annually Yes     Review of Systems   Constitutional: Negative for fatigue.   HENT: Positive for hearing loss.    Eyes: Positive for visual disturbance.   Respiratory: Negative for shortness of breath.    Cardiovascular: Negative for chest pain.   Gastrointestinal: Negative for abdominal pain.   Musculoskeletal: Negative for arthralgias.   Skin: Negative for wound.   Neurological: Negative for headaches.   Hematological: Does not bruise/bleed easily.   Psychiatric/Behavioral: Negative for sleep disturbance.       Objective:   Physical Exam  Vitals signs reviewed.   Constitutional:       Appearance: He is well-developed.   Neck:      Thyroid: No thyromegaly.   Cardiovascular:      Rate and Rhythm: Normal rate.   Pulmonary:      Effort: Pulmonary effort is normal.   Abdominal:      Palpations: Abdomen is soft.   Musculoskeletal:      Right lower leg: Edema  "present.      Left lower leg: Edema present.     Vgo and dexcom sites are without edema or erythema. No lipo hypertropthy or atrophy  Diabetes Foot Exam:   No sores or lesions to bilateral feet  Charcot foot on right  Shoes appropriate  Monofilament and vibratory sensation not intact    Vitals:    12/02/20 1416   BP: 134/72   Weight: 121.5 kg (267 lb 12 oz)   Height: 5' 7" (1.702 m)       BP Readings from Last 3 Encounters:   12/02/20 134/72   10/21/20 (!) 145/80   09/29/20 120/65     Wt Readings from Last 1 Encounters:   12/02/20 1416 121.5 kg (267 lb 12 oz)     Body mass index is 41.94 kg/m².    Lab Review:   Lab Results   Component Value Date    HGBA1C 9.2 (H) 10/14/2020     Lab Results   Component Value Date    CHOL 143 02/21/2020    HDL 49 02/21/2020    LDLCALC 76.4 02/21/2020    TRIG 88 02/21/2020    CHOLHDL 34.3 02/21/2020     Lab Results   Component Value Date     10/14/2020    K 4.6 10/14/2020     (H) 10/14/2020    CO2 22 (L) 10/14/2020     (H) 10/14/2020    BUN 18 10/14/2020    CREATININE 1.8 (H) 10/14/2020    CALCIUM 8.1 (L) 10/14/2020    PROT 5.8 (L) 10/14/2020    ALBUMIN 3.3 (L) 10/14/2020    BILITOT 0.3 10/14/2020    ALKPHOS 156 (H) 10/14/2020    AST 49 (H) 10/14/2020    ALT 50 (H) 10/14/2020    ANIONGAP 10 10/14/2020    ESTGFRAFRICA 44.3 (A) 10/14/2020    EGFRNONAA 38.4 (A) 10/14/2020    TSH 2.211 01/28/2019       Assessment and Plan     1. Type 2 diabetes mellitus with other specified complication, with long-term current use of insulin  Comprehensive Metabolic Panel    Hemoglobin A1C    Ambulatory referral/consult to Podiatry   2. Severe obesity (BMI 35.0-35.9 with comorbidity)     3. Hyperlipidemia associated with type 2 diabetes mellitus     4. Hypertension associated with diabetes       Type 2 diabetes mellitus, with long-term current use of insulin  Complicated by neuropathy, non compliance and fear of low blood sugars  Dexcom reviewed: He is forgetting to click at times. On " days that he forgets to click, his blood sugars stay persistently high. His blood sugars come down nicely when he clicks.   Medication Changes:   Continue Vgo 20 -  4 clicks before breakfast if you are having oatmeal; 3 clicks before any other breakfast; 4 clicks before lunch and dinner  1-2 click with a dessert  Click 5-10 minutes prior to your meal    We will download dexcom in 1 week.  Message me if you have any blood sugars less than 70    Definitely continue dexcom as he knows his blood sugars. However, I fear he is correcting blood sugar of 90 as he will not let me change lower limit below 90 as he states that he feels poorly at 85.     -- Considering BGs in 40's in the past coupled with fear of hypoglycemia, we will continue with continuous  glucose monitor dexcom.   -- previously given information on how to correct blood sugars in order to not increase blood sugar above goal  -- previously given information on low carb snacks and drinks  -- Reviewed goals of therapy are to get the best control we can without hypoglycemia  -- Reviewed patient's current insulin regimen. Clarified proper insulin dose and timing in relation to meals, etc. Insulin injection sites and proper rotation instructed.    -- Advised frequent self blood glucose monitoring.  Patient encouraged to document glucose results and bring them to every clinic visit    -- Hypoglycemia precautions discussed. Instructed on precautions before driving.    -- Call for Bg repeatedly < 90 or > 180.   -- Close adherence to lifestyle changes recommended.   -- Periodic follow ups for eye evaluations, foot care and dental care suggested.        Severe obesity (BMI 35.0-35.9 with comorbidity)  -- encouraged dietary and lifestyle modifications   -- emphasized weight loss goals   -- No GLP1 with pancreatitis history        Hyperlipidemia associated with type 2 diabetes mellitus  Controlled   On statin per ADA recommendations      Hypertension associated with  diabetes  -- not on ACEI ARB-defer to renal   -- Controlled  -- Blood pressure goals discussed with patient        Follow up in about 4 weeks (around 12/30/2020).  Labs prior

## 2020-12-02 ENCOUNTER — LAB VISIT (OUTPATIENT)
Dept: LAB | Facility: HOSPITAL | Age: 65
End: 2020-12-02
Payer: MEDICARE

## 2020-12-02 ENCOUNTER — OFFICE VISIT (OUTPATIENT)
Dept: ENDOCRINOLOGY | Facility: CLINIC | Age: 65
End: 2020-12-02
Payer: MEDICARE

## 2020-12-02 VITALS
SYSTOLIC BLOOD PRESSURE: 134 MMHG | DIASTOLIC BLOOD PRESSURE: 72 MMHG | HEIGHT: 67 IN | BODY MASS INDEX: 42.02 KG/M2 | WEIGHT: 267.75 LBS

## 2020-12-02 DIAGNOSIS — E11.69 HYPERLIPIDEMIA ASSOCIATED WITH TYPE 2 DIABETES MELLITUS: ICD-10-CM

## 2020-12-02 DIAGNOSIS — E11.69 TYPE 2 DIABETES MELLITUS WITH OTHER SPECIFIED COMPLICATION, WITH LONG-TERM CURRENT USE OF INSULIN: ICD-10-CM

## 2020-12-02 DIAGNOSIS — I15.2 HYPERTENSION ASSOCIATED WITH DIABETES: ICD-10-CM

## 2020-12-02 DIAGNOSIS — E78.5 HYPERLIPIDEMIA ASSOCIATED WITH TYPE 2 DIABETES MELLITUS: ICD-10-CM

## 2020-12-02 DIAGNOSIS — Z79.4 TYPE 2 DIABETES MELLITUS WITH OTHER SPECIFIED COMPLICATION, WITH LONG-TERM CURRENT USE OF INSULIN: ICD-10-CM

## 2020-12-02 DIAGNOSIS — E66.01 SEVERE OBESITY (BMI 35.0-35.9 WITH COMORBIDITY): ICD-10-CM

## 2020-12-02 DIAGNOSIS — Z79.4 TYPE 2 DIABETES MELLITUS WITH OTHER SPECIFIED COMPLICATION, WITH LONG-TERM CURRENT USE OF INSULIN: Primary | ICD-10-CM

## 2020-12-02 DIAGNOSIS — E11.69 TYPE 2 DIABETES MELLITUS WITH OTHER SPECIFIED COMPLICATION, WITH LONG-TERM CURRENT USE OF INSULIN: Primary | ICD-10-CM

## 2020-12-02 DIAGNOSIS — E11.59 HYPERTENSION ASSOCIATED WITH DIABETES: ICD-10-CM

## 2020-12-02 LAB
ALBUMIN SERPL BCP-MCNC: 3.2 G/DL (ref 3.5–5.2)
ALP SERPL-CCNC: 162 U/L (ref 55–135)
ALT SERPL W/O P-5'-P-CCNC: 54 U/L (ref 10–44)
ANION GAP SERPL CALC-SCNC: 6 MMOL/L (ref 8–16)
AST SERPL-CCNC: 55 U/L (ref 10–40)
BILIRUB SERPL-MCNC: 0.3 MG/DL (ref 0.1–1)
BUN SERPL-MCNC: 21 MG/DL (ref 8–23)
CALCIUM SERPL-MCNC: 8 MG/DL (ref 8.7–10.5)
CHLORIDE SERPL-SCNC: 108 MMOL/L (ref 95–110)
CO2 SERPL-SCNC: 25 MMOL/L (ref 23–29)
CREAT SERPL-MCNC: 1.9 MG/DL (ref 0.5–1.4)
EST. GFR  (AFRICAN AMERICAN): 41.5 ML/MIN/1.73 M^2
EST. GFR  (NON AFRICAN AMERICAN): 35.9 ML/MIN/1.73 M^2
ESTIMATED AVG GLUCOSE: 166 MG/DL (ref 68–131)
GLUCOSE SERPL-MCNC: 442 MG/DL (ref 70–110)
HBA1C MFR BLD HPLC: 7.4 % (ref 4–5.6)
POTASSIUM SERPL-SCNC: 4.6 MMOL/L (ref 3.5–5.1)
PROT SERPL-MCNC: 6.2 G/DL (ref 6–8.4)
SODIUM SERPL-SCNC: 139 MMOL/L (ref 136–145)

## 2020-12-02 PROCEDURE — 99214 OFFICE O/P EST MOD 30 MIN: CPT | Mod: 25,S$GLB,, | Performed by: NURSE PRACTITIONER

## 2020-12-02 PROCEDURE — 3046F PR MOST RECENT HEMOGLOBIN A1C LEVEL > 9.0%: ICD-10-PCS | Mod: CPTII,S$GLB,, | Performed by: NURSE PRACTITIONER

## 2020-12-02 PROCEDURE — 1126F PR PAIN SEVERITY QUANTIFIED, NO PAIN PRESENT: ICD-10-PCS | Mod: S$GLB,,, | Performed by: NURSE PRACTITIONER

## 2020-12-02 PROCEDURE — 3078F PR MOST RECENT DIASTOLIC BLOOD PRESSURE < 80 MM HG: ICD-10-PCS | Mod: CPTII,S$GLB,, | Performed by: NURSE PRACTITIONER

## 2020-12-02 PROCEDURE — 3008F BODY MASS INDEX DOCD: CPT | Mod: CPTII,S$GLB,, | Performed by: NURSE PRACTITIONER

## 2020-12-02 PROCEDURE — 99999 PR PBB SHADOW E&M-EST. PATIENT-LVL V: ICD-10-PCS | Mod: PBBFAC,,, | Performed by: NURSE PRACTITIONER

## 2020-12-02 PROCEDURE — 1159F MED LIST DOCD IN RCRD: CPT | Mod: S$GLB,,, | Performed by: NURSE PRACTITIONER

## 2020-12-02 PROCEDURE — 99214 PR OFFICE/OUTPT VISIT, EST, LEVL IV, 30-39 MIN: ICD-10-PCS | Mod: 25,S$GLB,, | Performed by: NURSE PRACTITIONER

## 2020-12-02 PROCEDURE — 3078F DIAST BP <80 MM HG: CPT | Mod: CPTII,S$GLB,, | Performed by: NURSE PRACTITIONER

## 2020-12-02 PROCEDURE — 36415 COLL VENOUS BLD VENIPUNCTURE: CPT

## 2020-12-02 PROCEDURE — 3075F PR MOST RECENT SYSTOLIC BLOOD PRESS GE 130-139MM HG: ICD-10-PCS | Mod: CPTII,S$GLB,, | Performed by: NURSE PRACTITIONER

## 2020-12-02 PROCEDURE — 3008F PR BODY MASS INDEX (BMI) DOCUMENTED: ICD-10-PCS | Mod: CPTII,S$GLB,, | Performed by: NURSE PRACTITIONER

## 2020-12-02 PROCEDURE — 1159F PR MEDICATION LIST DOCUMENTED IN MEDICAL RECORD: ICD-10-PCS | Mod: S$GLB,,, | Performed by: NURSE PRACTITIONER

## 2020-12-02 PROCEDURE — 99999 PR PBB SHADOW E&M-EST. PATIENT-LVL V: CPT | Mod: PBBFAC,,, | Performed by: NURSE PRACTITIONER

## 2020-12-02 PROCEDURE — 3046F HEMOGLOBIN A1C LEVEL >9.0%: CPT | Mod: CPTII,S$GLB,, | Performed by: NURSE PRACTITIONER

## 2020-12-02 PROCEDURE — 80053 COMPREHEN METABOLIC PANEL: CPT

## 2020-12-02 PROCEDURE — 1126F AMNT PAIN NOTED NONE PRSNT: CPT | Mod: S$GLB,,, | Performed by: NURSE PRACTITIONER

## 2020-12-02 PROCEDURE — 3075F SYST BP GE 130 - 139MM HG: CPT | Mod: CPTII,S$GLB,, | Performed by: NURSE PRACTITIONER

## 2020-12-02 PROCEDURE — 83036 HEMOGLOBIN GLYCOSYLATED A1C: CPT

## 2020-12-02 NOTE — ASSESSMENT & PLAN NOTE
-- encouraged dietary and lifestyle modifications   -- emphasized weight loss goals   -- No GLP1 with pancreatitis history

## 2020-12-02 NOTE — ASSESSMENT & PLAN NOTE
Complicated by neuropathy, non compliance and fear of low blood sugars  Dexcom reviewed: He is forgetting to click at times. On days that he forgets to click, his blood sugars stay persistently high. His blood sugars come down nicely when he clicks.   Medication Changes:   Continue Vgo 20 -  4 clicks before breakfast if you are having oatmeal; 3 clicks before any other breakfast; 4 clicks before lunch and dinner  1-2 click with a dessert  Click 5-10 minutes prior to your meal    We will download dexcom in 1 week.  Message me if you have any blood sugars less than 70    Definitely continue dexcom as he knows his blood sugars. However, I fear he is correcting blood sugar of 90 as he will not let me change lower limit below 90 as he states that he feels poorly at 85.     -- Considering BGs in 40's in the past coupled with fear of hypoglycemia, we will continue with continuous  glucose monitor dexcom.   -- previously given information on how to correct blood sugars in order to not increase blood sugar above goal  -- previously given information on low carb snacks and drinks  -- Reviewed goals of therapy are to get the best control we can without hypoglycemia  -- Reviewed patient's current insulin regimen. Clarified proper insulin dose and timing in relation to meals, etc. Insulin injection sites and proper rotation instructed.    -- Advised frequent self blood glucose monitoring.  Patient encouraged to document glucose results and bring them to every clinic visit    -- Hypoglycemia precautions discussed. Instructed on precautions before driving.    -- Call for Bg repeatedly < 90 or > 180.   -- Close adherence to lifestyle changes recommended.   -- Periodic follow ups for eye evaluations, foot care and dental care suggested.

## 2020-12-02 NOTE — PATIENT INSTRUCTIONS
Diabetes    Continue Vgo 20 -  4 clicks before breakfast if you are having oatmeal; 3 clicks before any other breakfast; 4 clicks before lunch and dinner  1-2 click with a dessert  Click 5-10 minutes prior to your meal    I am going to download dexcom in 1 weeks.  Message me if you have any blood sugars less than 70

## 2020-12-03 ENCOUNTER — PATIENT MESSAGE (OUTPATIENT)
Dept: ENDOCRINOLOGY | Facility: CLINIC | Age: 65
End: 2020-12-03

## 2020-12-03 ENCOUNTER — TELEPHONE (OUTPATIENT)
Dept: ENDOCRINOLOGY | Facility: CLINIC | Age: 65
End: 2020-12-03

## 2020-12-16 ENCOUNTER — TELEPHONE (OUTPATIENT)
Dept: ENDOCRINOLOGY | Facility: CLINIC | Age: 65
End: 2020-12-16

## 2020-12-22 DIAGNOSIS — E11.69 TYPE 2 DIABETES MELLITUS WITH OTHER SPECIFIED COMPLICATION, WITH LONG-TERM CURRENT USE OF INSULIN: ICD-10-CM

## 2020-12-22 DIAGNOSIS — Z79.4 TYPE 2 DIABETES MELLITUS WITH OTHER SPECIFIED COMPLICATION, WITH LONG-TERM CURRENT USE OF INSULIN: ICD-10-CM

## 2020-12-22 RX ORDER — BLOOD-GLUCOSE TRANSMITTER
1 EACH MISCELLANEOUS CONTINUOUS PRN
Qty: 1 EACH | Status: SHIPPED | OUTPATIENT
Start: 2020-12-22 | End: 2020-12-22 | Stop reason: SDUPTHER

## 2020-12-23 DIAGNOSIS — Z79.4 TYPE 2 DIABETES MELLITUS WITH OTHER SPECIFIED COMPLICATION, WITH LONG-TERM CURRENT USE OF INSULIN: ICD-10-CM

## 2020-12-23 DIAGNOSIS — E11.69 TYPE 2 DIABETES MELLITUS WITH OTHER SPECIFIED COMPLICATION, WITH LONG-TERM CURRENT USE OF INSULIN: ICD-10-CM

## 2020-12-23 RX ORDER — BLOOD-GLUCOSE TRANSMITTER
1 EACH MISCELLANEOUS CONTINUOUS PRN
Qty: 1 EACH | Status: SHIPPED | OUTPATIENT
Start: 2020-12-23 | End: 2020-12-28 | Stop reason: SDUPTHER

## 2020-12-23 RX ORDER — BLOOD-GLUCOSE TRANSMITTER
1 EACH MISCELLANEOUS CONTINUOUS PRN
Qty: 1 EACH | Status: SHIPPED | OUTPATIENT
Start: 2020-12-23 | End: 2020-12-23 | Stop reason: SDUPTHER

## 2020-12-23 RX ORDER — BLOOD-GLUCOSE SENSOR
3 EACH MISCELLANEOUS CONTINUOUS PRN
Qty: 3 EACH | Status: SHIPPED | OUTPATIENT
Start: 2020-12-23 | End: 2020-12-28 | Stop reason: SDUPTHER

## 2020-12-28 DIAGNOSIS — E11.69 TYPE 2 DIABETES MELLITUS WITH OTHER SPECIFIED COMPLICATION, WITH LONG-TERM CURRENT USE OF INSULIN: ICD-10-CM

## 2020-12-28 DIAGNOSIS — Z79.4 TYPE 2 DIABETES MELLITUS WITH OTHER SPECIFIED COMPLICATION, WITH LONG-TERM CURRENT USE OF INSULIN: ICD-10-CM

## 2020-12-28 RX ORDER — BLOOD-GLUCOSE SENSOR
3 EACH MISCELLANEOUS CONTINUOUS PRN
Qty: 3 EACH | Status: SHIPPED | OUTPATIENT
Start: 2020-12-28 | End: 2021-01-05 | Stop reason: SDUPTHER

## 2020-12-28 RX ORDER — BLOOD-GLUCOSE TRANSMITTER
1 EACH MISCELLANEOUS CONTINUOUS PRN
Qty: 1 EACH | Status: SHIPPED | OUTPATIENT
Start: 2020-12-28 | End: 2021-01-05 | Stop reason: SDUPTHER

## 2021-01-01 NOTE — TELEPHONE ENCOUNTER
----- Message from Isabelle Chavarria sent at 6/13/2019  8:57 AM CDT -----  Contact: Dr. Burger/ [phone]  Dr Burger   Dr. Burger with [facility] called in need referral to get patient in to skilled nursing facility please advise    early intervention

## 2021-01-05 DIAGNOSIS — E11.69 TYPE 2 DIABETES MELLITUS WITH OTHER SPECIFIED COMPLICATION, WITH LONG-TERM CURRENT USE OF INSULIN: ICD-10-CM

## 2021-01-05 DIAGNOSIS — Z79.4 TYPE 2 DIABETES MELLITUS WITH OTHER SPECIFIED COMPLICATION, WITH LONG-TERM CURRENT USE OF INSULIN: ICD-10-CM

## 2021-01-05 RX ORDER — BLOOD-GLUCOSE SENSOR
9 EACH MISCELLANEOUS CONTINUOUS PRN
Qty: 3 EACH | Status: SHIPPED | OUTPATIENT
Start: 2021-01-05 | End: 2021-02-25 | Stop reason: SDUPTHER

## 2021-01-05 RX ORDER — BLOOD-GLUCOSE TRANSMITTER
3 EACH MISCELLANEOUS CONTINUOUS PRN
Qty: 3 EACH | Status: SHIPPED | OUTPATIENT
Start: 2021-01-05 | End: 2021-02-25 | Stop reason: SDUPTHER

## 2021-01-06 DIAGNOSIS — E11.69 TYPE 2 DIABETES MELLITUS WITH OTHER SPECIFIED COMPLICATION, WITH LONG-TERM CURRENT USE OF INSULIN: ICD-10-CM

## 2021-01-06 DIAGNOSIS — Z79.4 TYPE 2 DIABETES MELLITUS WITH OTHER SPECIFIED COMPLICATION, WITH LONG-TERM CURRENT USE OF INSULIN: ICD-10-CM

## 2021-01-06 RX ORDER — INSULIN LISPRO 100 [IU]/ML
INJECTION, SOLUTION INTRAVENOUS; SUBCUTANEOUS
Qty: 90 ML | Refills: 1 | Status: CANCELLED | OUTPATIENT
Start: 2021-01-06

## 2021-01-11 ENCOUNTER — PATIENT OUTREACH (OUTPATIENT)
Dept: DIABETES | Facility: CLINIC | Age: 66
End: 2021-01-11

## 2021-01-11 DIAGNOSIS — Z79.4 TYPE 2 DIABETES MELLITUS WITH OTHER SPECIFIED COMPLICATION, WITH LONG-TERM CURRENT USE OF INSULIN: ICD-10-CM

## 2021-01-11 DIAGNOSIS — E11.69 TYPE 2 DIABETES MELLITUS WITH OTHER SPECIFIED COMPLICATION, WITH LONG-TERM CURRENT USE OF INSULIN: ICD-10-CM

## 2021-01-14 ENCOUNTER — PATIENT OUTREACH (OUTPATIENT)
Dept: ADMINISTRATIVE | Facility: OTHER | Age: 66
End: 2021-01-14

## 2021-01-15 ENCOUNTER — OFFICE VISIT (OUTPATIENT)
Dept: ENDOCRINOLOGY | Facility: CLINIC | Age: 66
End: 2021-01-15
Payer: MEDICARE

## 2021-01-15 VITALS
WEIGHT: 254.31 LBS | SYSTOLIC BLOOD PRESSURE: 138 MMHG | BODY MASS INDEX: 39.91 KG/M2 | DIASTOLIC BLOOD PRESSURE: 90 MMHG | HEIGHT: 67 IN

## 2021-01-15 DIAGNOSIS — I15.2 HYPERTENSION ASSOCIATED WITH DIABETES: ICD-10-CM

## 2021-01-15 DIAGNOSIS — E66.01 SEVERE OBESITY (BMI 35.0-35.9 WITH COMORBIDITY): ICD-10-CM

## 2021-01-15 DIAGNOSIS — E11.69 HYPERLIPIDEMIA ASSOCIATED WITH TYPE 2 DIABETES MELLITUS: ICD-10-CM

## 2021-01-15 DIAGNOSIS — Z79.4 TYPE 2 DIABETES MELLITUS WITH OTHER SPECIFIED COMPLICATION, WITH LONG-TERM CURRENT USE OF INSULIN: ICD-10-CM

## 2021-01-15 DIAGNOSIS — E11.59 HYPERTENSION ASSOCIATED WITH DIABETES: ICD-10-CM

## 2021-01-15 DIAGNOSIS — E11.69 TYPE 2 DIABETES MELLITUS WITH OTHER SPECIFIED COMPLICATION, WITH LONG-TERM CURRENT USE OF INSULIN: ICD-10-CM

## 2021-01-15 DIAGNOSIS — E78.5 HYPERLIPIDEMIA ASSOCIATED WITH TYPE 2 DIABETES MELLITUS: ICD-10-CM

## 2021-01-15 PROCEDURE — 3075F SYST BP GE 130 - 139MM HG: CPT | Mod: CPTII,S$GLB,, | Performed by: NURSE PRACTITIONER

## 2021-01-15 PROCEDURE — 3075F PR MOST RECENT SYSTOLIC BLOOD PRESS GE 130-139MM HG: ICD-10-PCS | Mod: CPTII,S$GLB,, | Performed by: NURSE PRACTITIONER

## 2021-01-15 PROCEDURE — 3051F HG A1C>EQUAL 7.0%<8.0%: CPT | Mod: CPTII,S$GLB,, | Performed by: NURSE PRACTITIONER

## 2021-01-15 PROCEDURE — 99214 OFFICE O/P EST MOD 30 MIN: CPT | Mod: 25,S$GLB,, | Performed by: NURSE PRACTITIONER

## 2021-01-15 PROCEDURE — 1159F PR MEDICATION LIST DOCUMENTED IN MEDICAL RECORD: ICD-10-PCS | Mod: S$GLB,,, | Performed by: NURSE PRACTITIONER

## 2021-01-15 PROCEDURE — 3008F BODY MASS INDEX DOCD: CPT | Mod: CPTII,S$GLB,, | Performed by: NURSE PRACTITIONER

## 2021-01-15 PROCEDURE — 3080F PR MOST RECENT DIASTOLIC BLOOD PRESSURE >= 90 MM HG: ICD-10-PCS | Mod: CPTII,S$GLB,, | Performed by: NURSE PRACTITIONER

## 2021-01-15 PROCEDURE — 95251 PR GLUCOSE MONITOR, 72 HOUR, PHYS INTERP: ICD-10-PCS | Mod: S$GLB,,, | Performed by: NURSE PRACTITIONER

## 2021-01-15 PROCEDURE — 99214 PR OFFICE/OUTPT VISIT, EST, LEVL IV, 30-39 MIN: ICD-10-PCS | Mod: 25,S$GLB,, | Performed by: NURSE PRACTITIONER

## 2021-01-15 PROCEDURE — 3080F DIAST BP >= 90 MM HG: CPT | Mod: CPTII,S$GLB,, | Performed by: NURSE PRACTITIONER

## 2021-01-15 PROCEDURE — 1159F MED LIST DOCD IN RCRD: CPT | Mod: S$GLB,,, | Performed by: NURSE PRACTITIONER

## 2021-01-15 PROCEDURE — 99999 PR PBB SHADOW E&M-EST. PATIENT-LVL V: ICD-10-PCS | Mod: PBBFAC,,, | Performed by: NURSE PRACTITIONER

## 2021-01-15 PROCEDURE — 99999 PR PBB SHADOW E&M-EST. PATIENT-LVL V: CPT | Mod: PBBFAC,,, | Performed by: NURSE PRACTITIONER

## 2021-01-15 PROCEDURE — 1126F AMNT PAIN NOTED NONE PRSNT: CPT | Mod: S$GLB,,, | Performed by: NURSE PRACTITIONER

## 2021-01-15 PROCEDURE — 1126F PR PAIN SEVERITY QUANTIFIED, NO PAIN PRESENT: ICD-10-PCS | Mod: S$GLB,,, | Performed by: NURSE PRACTITIONER

## 2021-01-15 PROCEDURE — 95251 CONT GLUC MNTR ANALYSIS I&R: CPT | Mod: S$GLB,,, | Performed by: NURSE PRACTITIONER

## 2021-01-15 PROCEDURE — 3008F PR BODY MASS INDEX (BMI) DOCUMENTED: ICD-10-PCS | Mod: CPTII,S$GLB,, | Performed by: NURSE PRACTITIONER

## 2021-01-15 PROCEDURE — 3051F PR MOST RECENT HEMOGLOBIN A1C LEVEL 7.0 - < 8.0%: ICD-10-PCS | Mod: CPTII,S$GLB,, | Performed by: NURSE PRACTITIONER

## 2021-02-22 NOTE — PROGRESS NOTES
"  Physical Therapy Daily Treatment Note     Name: Jian JASMINE [facility]  Clinic Number: [id number]    Therapy Diagnosis:   Encounter Diagnoses   Name Primary?    Decreased strength     Impaired functional mobility, balance, gait, and endurance      Physician: Liliane Churchill MD    Visit Date: 10/7/2019    Physician Orders: PT Eval and Treat   Medical Diagnosis from Referral: Decreased mobility  Evaluation Date: 8/9/2019  Authorization Period Expiration: 12/31/19  Updated Plan of Care Expiration: 11/1/19  Visit # / Visits authorized: 13/ 50     Time In: 9:58 AM  Time Out: 10:58AM   Total Billable Time: 50 minutes      Precautions: Standard, Diabetes, Fall and Cardiac, BLE lymphedema    Subjective     Pt reports: "It was a long weekend because I think I may have a kidney stone." pt states he passed some red urine. PT encouraged pt to follow up with his primary care or urologist. Pt states he's had multiple kidney stones in the past. Pt reports he bought the corvette and was worried he wouldn't be able to stand from it, but he was able to. Pt states he is not to the point where he can carry anything up the stairs.  He was somewhat compliant with home exercise program.     Response to previous treatment: no adverse reaction  Functional change: able to get in/out of his new corvette.    Pain: 0/10    Location: R abdomen/flank    Objective         Jian received therapeutic exercises to develop strength, endurance, ROM and core stabilization for 50 minutes including:    Machines  · Precor Leg press: 20#, but deeper squat, 2  X 10 reps  · Precor leg press: 80#, 2  x 10 reps  · Recumbent bike, 8 min, Level 2    Sitting    · Seated marching, 2 x 10 reps/LE  · Seated kicks, 2 x 10 reps,/LE  · Sit <> stand, 2 x 10 reps, no hands, 10# kettlebell upside down    Supine    · Hamstring stretch c/ strap, 3 x 30s/LE  · Bridges, 2 x 10 reps, 3s holds      Standing  · Step- up on 8" step, 10/LE, UE Support on Precor bar  · Standing hip Ext, " 2 x 10/LE  · Hip flexion standing, 2 x 10, on airex  · Hip ABD standing, 2 x10, on airex    Not performed today:  · Standing hamstring curls, 2 x 10  · TRX Squats, 10  Supine clamshells c/ green t-band : 2 x 10   LTR c/ green t-band: x 15 B   Supine hip adduction isometric with ball : 2 x 10 reps , 5'' hold   Prone laying hip flexor stretch : 2' - NP  Mini-squats, 2 x 10, UE support on counter  · Step-ups at bottom step of therapy stairs, 2 x 10/LE - NP       Home Exercises Provided and Patient Education Provided     Education provided:     - Cues with exercises, POC, progress to date    Written Home Exercises Provided: Continue prior HEP  Exercises were reviewed and Jian was able to demonstrate them prior to the end of the session.  Jian demonstrated good  understanding of the education provided.     See EMR under Patient Instructions for exercises provided 8/20/2019.    Assessment   Pt had good tolerance to exercises performed today and with proper fatigue post treatment. Pt reports continued improvement with being able to stand from low surfaces. Pt states that hamstring stretch with strap feels good.. Pt will benefit from continued PT to work towards remaining/updated goals. Will progress as tolerated with emphasis on strength for getting up from low surfaces. Extending POC to 11/1/19     Jian is progressing well towards his goals.   Pt prognosis is Good.     Pt will continue to benefit from skilled outpatient physical therapy to address the deficits listed in the problem list box on initial evaluation, provide pt/family education and to maximize pt's level of independence in the home and community environment.   Pt's spiritual, cultural and educational needs considered and pt agreeable to plan of care and goals.    Anticipated barriers to physical therapy: co-morbidities    Goals:  Short Term Goals: 4 weeks   1. Pt will tolerate HEP for improved strength, functional mobility, ROM, posture, and endurance.  (progressing, not met)  2. Pt will demo >/= 4/5 strength in BLE's for improved functional mobility, endurance, and posture. (met, except for hip Ext)  3. Pt will reduce TUG (timed up and go) time to </= 17 sec for improved safety with community ambulation and decreased falls risk. (exceeded 9/10)  4. Pt will perform 9 sit <> stands with use of armrests in 30s for improved endurance. (exceeded, 9/10)  5. Pt will maintain balance in MCTSIB (Modified Clinical Test of Sensory Interaction on Balance) scenario 3 for >/=30s for improved balance/decreased falls risk. (met, 9/10)  6. Pt will report feeling more steady/confident on his feet when walking at home and in the community for improved functional mobility. (Met, 10/3)     Long Term Goals: 8 weeks   1. Pt will be I with updated HEP for improved functional mobility, posture, strength, and endurance. (progressing, not met)  2. Pt will demo 5/5 strength in BLE's for improved functional mobility, endurance, and posture. (progressing, not met)  3. Pt will reduce TUG (timed up and go) time to </= 11 sec for improved safety with community ambulation and decreased falls risk. (Met, 10/3  4. Pt will perform 11 sit <> stands with use of armrests in 30s for improved endurance. (Exceeded, 10/3)  5. Pt will maintain balance in MCTSIB (Modified Clinical Test of Sensory Interaction on Balance) scenario 4 for >/=30s for improved balance/decreased falls risk. (progressing, not met)  6. Pt will report/demo negotiating 1 flight of stairs with Mod I for improved functional mobility at home and in the community. (Met, 10/3)    New goal:  7. Pt will report improved ability to stand from low surface and/or kneeling position for improved functional mobility. (progressing, not met)    Plan     Continue POC. Progress as tolerated.    Dora Mendez, PT    Eucrisa Counseling: Patient may experience a mild burning sensation during topical application. Eucrisa is not approved in children less than 2 years of age.

## 2021-02-24 ENCOUNTER — PATIENT OUTREACH (OUTPATIENT)
Dept: ADMINISTRATIVE | Facility: OTHER | Age: 66
End: 2021-02-24

## 2021-02-25 ENCOUNTER — OFFICE VISIT (OUTPATIENT)
Dept: ENDOCRINOLOGY | Facility: CLINIC | Age: 66
End: 2021-02-25
Payer: MEDICARE

## 2021-02-25 VITALS
WEIGHT: 262.25 LBS | BODY MASS INDEX: 41.16 KG/M2 | DIASTOLIC BLOOD PRESSURE: 83 MMHG | SYSTOLIC BLOOD PRESSURE: 145 MMHG | HEIGHT: 67 IN

## 2021-02-25 DIAGNOSIS — Z79.4 TYPE 2 DIABETES MELLITUS WITH OTHER SPECIFIED COMPLICATION, WITH LONG-TERM CURRENT USE OF INSULIN: ICD-10-CM

## 2021-02-25 DIAGNOSIS — E66.01 SEVERE OBESITY (BMI 35.0-35.9 WITH COMORBIDITY): ICD-10-CM

## 2021-02-25 DIAGNOSIS — E11.69 TYPE 2 DIABETES MELLITUS WITH OTHER SPECIFIED COMPLICATION, WITH LONG-TERM CURRENT USE OF INSULIN: ICD-10-CM

## 2021-02-25 DIAGNOSIS — E11.69 HYPERLIPIDEMIA ASSOCIATED WITH TYPE 2 DIABETES MELLITUS: Chronic | ICD-10-CM

## 2021-02-25 DIAGNOSIS — E11.59 HYPERTENSION ASSOCIATED WITH DIABETES: Chronic | ICD-10-CM

## 2021-02-25 DIAGNOSIS — E78.5 HYPERLIPIDEMIA ASSOCIATED WITH TYPE 2 DIABETES MELLITUS: Chronic | ICD-10-CM

## 2021-02-25 DIAGNOSIS — I15.2 HYPERTENSION ASSOCIATED WITH DIABETES: Chronic | ICD-10-CM

## 2021-02-25 PROCEDURE — 3079F DIAST BP 80-89 MM HG: CPT | Mod: CPTII,S$GLB,, | Performed by: NURSE PRACTITIONER

## 2021-02-25 PROCEDURE — 99999 PR PBB SHADOW E&M-EST. PATIENT-LVL IV: CPT | Mod: PBBFAC,,, | Performed by: NURSE PRACTITIONER

## 2021-02-25 PROCEDURE — 1159F PR MEDICATION LIST DOCUMENTED IN MEDICAL RECORD: ICD-10-PCS | Mod: S$GLB,,, | Performed by: NURSE PRACTITIONER

## 2021-02-25 PROCEDURE — 95251 CONT GLUC MNTR ANALYSIS I&R: CPT | Mod: S$GLB,,, | Performed by: NURSE PRACTITIONER

## 2021-02-25 PROCEDURE — 1159F MED LIST DOCD IN RCRD: CPT | Mod: S$GLB,,, | Performed by: NURSE PRACTITIONER

## 2021-02-25 PROCEDURE — 95251 PR GLUCOSE MONITOR, 72 HOUR, PHYS INTERP: ICD-10-PCS | Mod: S$GLB,,, | Performed by: NURSE PRACTITIONER

## 2021-02-25 PROCEDURE — 1126F PR PAIN SEVERITY QUANTIFIED, NO PAIN PRESENT: ICD-10-PCS | Mod: S$GLB,,, | Performed by: NURSE PRACTITIONER

## 2021-02-25 PROCEDURE — 99214 PR OFFICE/OUTPT VISIT, EST, LEVL IV, 30-39 MIN: ICD-10-PCS | Mod: 25,S$GLB,, | Performed by: NURSE PRACTITIONER

## 2021-02-25 PROCEDURE — 3008F PR BODY MASS INDEX (BMI) DOCUMENTED: ICD-10-PCS | Mod: CPTII,S$GLB,, | Performed by: NURSE PRACTITIONER

## 2021-02-25 PROCEDURE — 3051F PR MOST RECENT HEMOGLOBIN A1C LEVEL 7.0 - < 8.0%: ICD-10-PCS | Mod: CPTII,S$GLB,, | Performed by: NURSE PRACTITIONER

## 2021-02-25 PROCEDURE — 1126F AMNT PAIN NOTED NONE PRSNT: CPT | Mod: S$GLB,,, | Performed by: NURSE PRACTITIONER

## 2021-02-25 PROCEDURE — 3051F HG A1C>EQUAL 7.0%<8.0%: CPT | Mod: CPTII,S$GLB,, | Performed by: NURSE PRACTITIONER

## 2021-02-25 PROCEDURE — 99214 OFFICE O/P EST MOD 30 MIN: CPT | Mod: 25,S$GLB,, | Performed by: NURSE PRACTITIONER

## 2021-02-25 PROCEDURE — 3079F PR MOST RECENT DIASTOLIC BLOOD PRESSURE 80-89 MM HG: ICD-10-PCS | Mod: CPTII,S$GLB,, | Performed by: NURSE PRACTITIONER

## 2021-02-25 PROCEDURE — 3077F SYST BP >= 140 MM HG: CPT | Mod: CPTII,S$GLB,, | Performed by: NURSE PRACTITIONER

## 2021-02-25 PROCEDURE — 99999 PR PBB SHADOW E&M-EST. PATIENT-LVL IV: ICD-10-PCS | Mod: PBBFAC,,, | Performed by: NURSE PRACTITIONER

## 2021-02-25 PROCEDURE — 3077F PR MOST RECENT SYSTOLIC BLOOD PRESSURE >= 140 MM HG: ICD-10-PCS | Mod: CPTII,S$GLB,, | Performed by: NURSE PRACTITIONER

## 2021-02-25 PROCEDURE — 3008F BODY MASS INDEX DOCD: CPT | Mod: CPTII,S$GLB,, | Performed by: NURSE PRACTITIONER

## 2021-02-25 RX ORDER — BLOOD-GLUCOSE SENSOR
9 EACH MISCELLANEOUS CONTINUOUS
Qty: 3 EACH | Status: SHIPPED | OUTPATIENT
Start: 2021-02-25 | End: 2021-02-25 | Stop reason: SDUPTHER

## 2021-02-25 RX ORDER — BLOOD-GLUCOSE TRANSMITTER
3 EACH MISCELLANEOUS CONTINUOUS
Qty: 3 EACH | Status: SHIPPED | OUTPATIENT
Start: 2021-02-25 | End: 2021-02-25 | Stop reason: SDUPTHER

## 2021-02-25 RX ORDER — BLOOD-GLUCOSE SENSOR
9 EACH MISCELLANEOUS CONTINUOUS
Qty: 3 EACH | Status: SHIPPED | OUTPATIENT
Start: 2021-02-25 | End: 2022-10-21 | Stop reason: SDUPTHER

## 2021-02-25 RX ORDER — BLOOD-GLUCOSE TRANSMITTER
3 EACH MISCELLANEOUS CONTINUOUS
Qty: 3 EACH | Status: SHIPPED | OUTPATIENT
Start: 2021-02-25 | End: 2022-10-21 | Stop reason: SDUPTHER

## 2021-03-04 ENCOUNTER — OFFICE VISIT (OUTPATIENT)
Dept: INTERNAL MEDICINE | Facility: CLINIC | Age: 66
End: 2021-03-04
Payer: MEDICARE

## 2021-03-04 VITALS
HEIGHT: 67 IN | TEMPERATURE: 98 F | SYSTOLIC BLOOD PRESSURE: 140 MMHG | DIASTOLIC BLOOD PRESSURE: 75 MMHG | BODY MASS INDEX: 40.62 KG/M2 | WEIGHT: 258.81 LBS | OXYGEN SATURATION: 98 % | HEART RATE: 79 BPM

## 2021-03-04 DIAGNOSIS — I70.90 ATHEROSCLEROSIS: ICD-10-CM

## 2021-03-04 DIAGNOSIS — I81 PORTAL HYPERTENSION DUE TO OBSTRUCTION OF EXTRAHEPATIC PORTAL VEIN: Chronic | ICD-10-CM

## 2021-03-04 DIAGNOSIS — I15.2 HYPERTENSION ASSOCIATED WITH DIABETES: Chronic | ICD-10-CM

## 2021-03-04 DIAGNOSIS — Z01.818 PRE-OP EXAM: Primary | ICD-10-CM

## 2021-03-04 DIAGNOSIS — E11.59 HYPERTENSION ASSOCIATED WITH DIABETES: Chronic | ICD-10-CM

## 2021-03-04 DIAGNOSIS — E78.5 HYPERLIPIDEMIA ASSOCIATED WITH TYPE 2 DIABETES MELLITUS: Chronic | ICD-10-CM

## 2021-03-04 DIAGNOSIS — D50.9 IRON DEFICIENCY ANEMIA, UNSPECIFIED IRON DEFICIENCY ANEMIA TYPE: ICD-10-CM

## 2021-03-04 DIAGNOSIS — E66.2 OBESITY HYPOVENTILATION SYNDROME: Chronic | ICD-10-CM

## 2021-03-04 DIAGNOSIS — Z79.4 TYPE 2 DIABETES MELLITUS WITH OTHER SPECIFIED COMPLICATION, WITH LONG-TERM CURRENT USE OF INSULIN: ICD-10-CM

## 2021-03-04 DIAGNOSIS — K76.6 PORTAL HYPERTENSION DUE TO OBSTRUCTION OF EXTRAHEPATIC PORTAL VEIN: Chronic | ICD-10-CM

## 2021-03-04 DIAGNOSIS — I50.42 CHRONIC COMBINED SYSTOLIC AND DIASTOLIC HEART FAILURE: ICD-10-CM

## 2021-03-04 DIAGNOSIS — E66.01 MORBID OBESITY: ICD-10-CM

## 2021-03-04 DIAGNOSIS — I89.0 LYMPHEDEMA OF BOTH LOWER EXTREMITIES: Chronic | ICD-10-CM

## 2021-03-04 DIAGNOSIS — I25.84 CORONARY ARTERY DISEASE DUE TO CALCIFIED CORONARY LESION: Chronic | ICD-10-CM

## 2021-03-04 DIAGNOSIS — I25.10 CORONARY ARTERY DISEASE DUE TO CALCIFIED CORONARY LESION: Chronic | ICD-10-CM

## 2021-03-04 DIAGNOSIS — N43.3 HYDROCELE, BILATERAL: ICD-10-CM

## 2021-03-04 DIAGNOSIS — E11.69 TYPE 2 DIABETES MELLITUS WITH OTHER SPECIFIED COMPLICATION, WITH LONG-TERM CURRENT USE OF INSULIN: ICD-10-CM

## 2021-03-04 DIAGNOSIS — E11.69 HYPERLIPIDEMIA ASSOCIATED WITH TYPE 2 DIABETES MELLITUS: Chronic | ICD-10-CM

## 2021-03-04 DIAGNOSIS — N18.30 STAGE 3 CHRONIC KIDNEY DISEASE, UNSPECIFIED WHETHER STAGE 3A OR 3B CKD: Chronic | ICD-10-CM

## 2021-03-04 DIAGNOSIS — I48.0 PAROXYSMAL ATRIAL FIBRILLATION: ICD-10-CM

## 2021-03-04 PROCEDURE — 1159F MED LIST DOCD IN RCRD: CPT | Mod: S$GLB,,, | Performed by: INTERNAL MEDICINE

## 2021-03-04 PROCEDURE — 99499 UNLISTED E&M SERVICE: CPT | Mod: S$GLB,,, | Performed by: INTERNAL MEDICINE

## 2021-03-04 PROCEDURE — 1159F PR MEDICATION LIST DOCUMENTED IN MEDICAL RECORD: ICD-10-PCS | Mod: S$GLB,,, | Performed by: INTERNAL MEDICINE

## 2021-03-04 PROCEDURE — 3288F FALL RISK ASSESSMENT DOCD: CPT | Mod: CPTII,S$GLB,, | Performed by: INTERNAL MEDICINE

## 2021-03-04 PROCEDURE — 1101F PR PT FALLS ASSESS DOC 0-1 FALLS W/OUT INJ PAST YR: ICD-10-PCS | Mod: CPTII,S$GLB,, | Performed by: INTERNAL MEDICINE

## 2021-03-04 PROCEDURE — 93010 EKG 12-LEAD: ICD-10-PCS | Mod: S$GLB,,, | Performed by: INTERNAL MEDICINE

## 2021-03-04 PROCEDURE — 93010 ELECTROCARDIOGRAM REPORT: CPT | Mod: S$GLB,,, | Performed by: INTERNAL MEDICINE

## 2021-03-04 PROCEDURE — 1126F AMNT PAIN NOTED NONE PRSNT: CPT | Mod: S$GLB,,, | Performed by: INTERNAL MEDICINE

## 2021-03-04 PROCEDURE — 3051F HG A1C>EQUAL 7.0%<8.0%: CPT | Mod: CPTII,S$GLB,, | Performed by: INTERNAL MEDICINE

## 2021-03-04 PROCEDURE — 93005 ELECTROCARDIOGRAM TRACING: CPT | Mod: S$GLB,,, | Performed by: INTERNAL MEDICINE

## 2021-03-04 PROCEDURE — 1101F PT FALLS ASSESS-DOCD LE1/YR: CPT | Mod: CPTII,S$GLB,, | Performed by: INTERNAL MEDICINE

## 2021-03-04 PROCEDURE — 3008F PR BODY MASS INDEX (BMI) DOCUMENTED: ICD-10-PCS | Mod: CPTII,S$GLB,, | Performed by: INTERNAL MEDICINE

## 2021-03-04 PROCEDURE — 99214 OFFICE O/P EST MOD 30 MIN: CPT | Mod: S$GLB,,, | Performed by: INTERNAL MEDICINE

## 2021-03-04 PROCEDURE — 99999 PR PBB SHADOW E&M-EST. PATIENT-LVL IV: CPT | Mod: PBBFAC,,, | Performed by: INTERNAL MEDICINE

## 2021-03-04 PROCEDURE — 99499 RISK ADDL DX/OHS AUDIT: ICD-10-PCS | Mod: S$GLB,,, | Performed by: INTERNAL MEDICINE

## 2021-03-04 PROCEDURE — 99214 PR OFFICE/OUTPT VISIT, EST, LEVL IV, 30-39 MIN: ICD-10-PCS | Mod: S$GLB,,, | Performed by: INTERNAL MEDICINE

## 2021-03-04 PROCEDURE — 99999 PR PBB SHADOW E&M-EST. PATIENT-LVL IV: ICD-10-PCS | Mod: PBBFAC,,, | Performed by: INTERNAL MEDICINE

## 2021-03-04 PROCEDURE — 1126F PR PAIN SEVERITY QUANTIFIED, NO PAIN PRESENT: ICD-10-PCS | Mod: S$GLB,,, | Performed by: INTERNAL MEDICINE

## 2021-03-04 PROCEDURE — 3008F BODY MASS INDEX DOCD: CPT | Mod: CPTII,S$GLB,, | Performed by: INTERNAL MEDICINE

## 2021-03-04 PROCEDURE — 3288F PR FALLS RISK ASSESSMENT DOCUMENTED: ICD-10-PCS | Mod: CPTII,S$GLB,, | Performed by: INTERNAL MEDICINE

## 2021-03-04 PROCEDURE — 3051F PR MOST RECENT HEMOGLOBIN A1C LEVEL 7.0 - < 8.0%: ICD-10-PCS | Mod: CPTII,S$GLB,, | Performed by: INTERNAL MEDICINE

## 2021-03-04 PROCEDURE — 93005 EKG 12-LEAD: ICD-10-PCS | Mod: S$GLB,,, | Performed by: INTERNAL MEDICINE

## 2021-03-11 ENCOUNTER — PATIENT MESSAGE (OUTPATIENT)
Dept: UROLOGY | Facility: CLINIC | Age: 66
End: 2021-03-11

## 2021-03-15 PROBLEM — E66.01 SEVERE OBESITY (BMI 35.0-35.9 WITH COMORBIDITY): Status: RESOLVED | Noted: 2020-05-06 | Resolved: 2021-03-15

## 2021-03-15 PROBLEM — E66.01 MORBID OBESITY: Status: ACTIVE | Noted: 2021-03-15

## 2021-03-18 ENCOUNTER — TELEPHONE (OUTPATIENT)
Dept: NEUROLOGY | Facility: HOSPITAL | Age: 66
End: 2021-03-18

## 2021-03-18 ENCOUNTER — OFFICE VISIT (OUTPATIENT)
Dept: UROLOGY | Facility: CLINIC | Age: 66
End: 2021-03-18
Payer: MEDICARE

## 2021-03-18 VITALS
SYSTOLIC BLOOD PRESSURE: 155 MMHG | DIASTOLIC BLOOD PRESSURE: 81 MMHG | WEIGHT: 263.13 LBS | BODY MASS INDEX: 41.21 KG/M2 | HEART RATE: 92 BPM

## 2021-03-18 DIAGNOSIS — N43.3 HYDROCELE, UNSPECIFIED HYDROCELE TYPE: Primary | ICD-10-CM

## 2021-03-18 DIAGNOSIS — N50.82 SCROTAL PAIN: ICD-10-CM

## 2021-03-18 PROCEDURE — 3079F PR MOST RECENT DIASTOLIC BLOOD PRESSURE 80-89 MM HG: ICD-10-PCS | Mod: CPTII,S$GLB,, | Performed by: STUDENT IN AN ORGANIZED HEALTH CARE EDUCATION/TRAINING PROGRAM

## 2021-03-18 PROCEDURE — 3077F PR MOST RECENT SYSTOLIC BLOOD PRESSURE >= 140 MM HG: ICD-10-PCS | Mod: CPTII,S$GLB,, | Performed by: STUDENT IN AN ORGANIZED HEALTH CARE EDUCATION/TRAINING PROGRAM

## 2021-03-18 PROCEDURE — 3077F SYST BP >= 140 MM HG: CPT | Mod: CPTII,S$GLB,, | Performed by: STUDENT IN AN ORGANIZED HEALTH CARE EDUCATION/TRAINING PROGRAM

## 2021-03-18 PROCEDURE — 1159F MED LIST DOCD IN RCRD: CPT | Mod: S$GLB,,, | Performed by: STUDENT IN AN ORGANIZED HEALTH CARE EDUCATION/TRAINING PROGRAM

## 2021-03-18 PROCEDURE — 1125F AMNT PAIN NOTED PAIN PRSNT: CPT | Mod: S$GLB,,, | Performed by: STUDENT IN AN ORGANIZED HEALTH CARE EDUCATION/TRAINING PROGRAM

## 2021-03-18 PROCEDURE — 1125F PR PAIN SEVERITY QUANTIFIED, PAIN PRESENT: ICD-10-PCS | Mod: S$GLB,,, | Performed by: STUDENT IN AN ORGANIZED HEALTH CARE EDUCATION/TRAINING PROGRAM

## 2021-03-18 PROCEDURE — 99214 OFFICE O/P EST MOD 30 MIN: CPT | Mod: S$GLB,,, | Performed by: STUDENT IN AN ORGANIZED HEALTH CARE EDUCATION/TRAINING PROGRAM

## 2021-03-18 PROCEDURE — 3008F BODY MASS INDEX DOCD: CPT | Mod: CPTII,S$GLB,, | Performed by: STUDENT IN AN ORGANIZED HEALTH CARE EDUCATION/TRAINING PROGRAM

## 2021-03-18 PROCEDURE — 3008F PR BODY MASS INDEX (BMI) DOCUMENTED: ICD-10-PCS | Mod: CPTII,S$GLB,, | Performed by: STUDENT IN AN ORGANIZED HEALTH CARE EDUCATION/TRAINING PROGRAM

## 2021-03-18 PROCEDURE — 99214 PR OFFICE/OUTPT VISIT, EST, LEVL IV, 30-39 MIN: ICD-10-PCS | Mod: S$GLB,,, | Performed by: STUDENT IN AN ORGANIZED HEALTH CARE EDUCATION/TRAINING PROGRAM

## 2021-03-18 PROCEDURE — 1159F PR MEDICATION LIST DOCUMENTED IN MEDICAL RECORD: ICD-10-PCS | Mod: S$GLB,,, | Performed by: STUDENT IN AN ORGANIZED HEALTH CARE EDUCATION/TRAINING PROGRAM

## 2021-03-18 PROCEDURE — 3079F DIAST BP 80-89 MM HG: CPT | Mod: CPTII,S$GLB,, | Performed by: STUDENT IN AN ORGANIZED HEALTH CARE EDUCATION/TRAINING PROGRAM

## 2021-03-18 PROCEDURE — 99999 PR PBB SHADOW E&M-EST. PATIENT-LVL IV: ICD-10-PCS | Mod: PBBFAC,,, | Performed by: STUDENT IN AN ORGANIZED HEALTH CARE EDUCATION/TRAINING PROGRAM

## 2021-03-18 PROCEDURE — 99999 PR PBB SHADOW E&M-EST. PATIENT-LVL IV: CPT | Mod: PBBFAC,,, | Performed by: STUDENT IN AN ORGANIZED HEALTH CARE EDUCATION/TRAINING PROGRAM

## 2021-03-26 ENCOUNTER — LAB VISIT (OUTPATIENT)
Dept: LAB | Facility: HOSPITAL | Age: 66
End: 2021-03-26
Attending: NURSE PRACTITIONER
Payer: MEDICARE

## 2021-03-26 DIAGNOSIS — Z01.818 PRE-OP EXAM: ICD-10-CM

## 2021-03-26 DIAGNOSIS — Z79.4 TYPE 2 DIABETES MELLITUS WITH OTHER SPECIFIED COMPLICATION, WITH LONG-TERM CURRENT USE OF INSULIN: ICD-10-CM

## 2021-03-26 DIAGNOSIS — E11.69 TYPE 2 DIABETES MELLITUS WITH OTHER SPECIFIED COMPLICATION, WITH LONG-TERM CURRENT USE OF INSULIN: ICD-10-CM

## 2021-03-26 LAB
ALBUMIN SERPL BCP-MCNC: 3.1 G/DL (ref 3.5–5.2)
ALP SERPL-CCNC: 139 U/L (ref 55–135)
ALT SERPL W/O P-5'-P-CCNC: 42 U/L (ref 10–44)
ANION GAP SERPL CALC-SCNC: 7 MMOL/L (ref 8–16)
AST SERPL-CCNC: 34 U/L (ref 10–40)
BASOPHILS # BLD AUTO: 0.05 K/UL (ref 0–0.2)
BASOPHILS NFR BLD: 0.9 % (ref 0–1.9)
BILIRUB SERPL-MCNC: 0.5 MG/DL (ref 0.1–1)
BUN SERPL-MCNC: 36 MG/DL (ref 8–23)
CALCIUM SERPL-MCNC: 8 MG/DL (ref 8.7–10.5)
CHLORIDE SERPL-SCNC: 108 MMOL/L (ref 95–110)
CHOLEST SERPL-MCNC: 138 MG/DL (ref 120–199)
CHOLEST/HDLC SERPL: 2.5 {RATIO} (ref 2–5)
CO2 SERPL-SCNC: 24 MMOL/L (ref 23–29)
CREAT SERPL-MCNC: 2 MG/DL (ref 0.5–1.4)
DIFFERENTIAL METHOD: ABNORMAL
EOSINOPHIL # BLD AUTO: 0.2 K/UL (ref 0–0.5)
EOSINOPHIL NFR BLD: 3.4 % (ref 0–8)
ERYTHROCYTE [DISTWIDTH] IN BLOOD BY AUTOMATED COUNT: 13.2 % (ref 11.5–14.5)
EST. GFR  (AFRICAN AMERICAN): 39 ML/MIN/1.73 M^2
EST. GFR  (NON AFRICAN AMERICAN): 33.8 ML/MIN/1.73 M^2
ESTIMATED AVG GLUCOSE: 169 MG/DL (ref 68–131)
GLUCOSE SERPL-MCNC: 282 MG/DL (ref 70–110)
HBA1C MFR BLD: 7.5 % (ref 4–5.6)
HCT VFR BLD AUTO: 37.7 % (ref 40–54)
HDLC SERPL-MCNC: 56 MG/DL (ref 40–75)
HDLC SERPL: 40.6 % (ref 20–50)
HGB BLD-MCNC: 12.5 G/DL (ref 14–18)
IMM GRANULOCYTES # BLD AUTO: 0.02 K/UL (ref 0–0.04)
IMM GRANULOCYTES NFR BLD AUTO: 0.4 % (ref 0–0.5)
LDLC SERPL CALC-MCNC: 64.8 MG/DL (ref 63–159)
LYMPHOCYTES # BLD AUTO: 1.6 K/UL (ref 1–4.8)
LYMPHOCYTES NFR BLD: 29.9 % (ref 18–48)
MCH RBC QN AUTO: 30.4 PG (ref 27–31)
MCHC RBC AUTO-ENTMCNC: 33.2 G/DL (ref 32–36)
MCV RBC AUTO: 92 FL (ref 82–98)
MONOCYTES # BLD AUTO: 0.4 K/UL (ref 0.3–1)
MONOCYTES NFR BLD: 6.8 % (ref 4–15)
NEUTROPHILS # BLD AUTO: 3.1 K/UL (ref 1.8–7.7)
NEUTROPHILS NFR BLD: 58.6 % (ref 38–73)
NONHDLC SERPL-MCNC: 82 MG/DL
NRBC BLD-RTO: 0 /100 WBC
PLATELET # BLD AUTO: 141 K/UL (ref 150–350)
PMV BLD AUTO: 11.7 FL (ref 9.2–12.9)
POTASSIUM SERPL-SCNC: 4.6 MMOL/L (ref 3.5–5.1)
PROT SERPL-MCNC: 6.2 G/DL (ref 6–8.4)
RBC # BLD AUTO: 4.11 M/UL (ref 4.6–6.2)
SODIUM SERPL-SCNC: 139 MMOL/L (ref 136–145)
TRIGL SERPL-MCNC: 86 MG/DL (ref 30–150)
WBC # BLD AUTO: 5.28 K/UL (ref 3.9–12.7)

## 2021-03-26 PROCEDURE — 85025 COMPLETE CBC W/AUTO DIFF WBC: CPT | Performed by: INTERNAL MEDICINE

## 2021-03-26 PROCEDURE — 80061 LIPID PANEL: CPT | Performed by: NURSE PRACTITIONER

## 2021-03-26 PROCEDURE — 80053 COMPREHEN METABOLIC PANEL: CPT | Performed by: NURSE PRACTITIONER

## 2021-03-26 PROCEDURE — 36415 COLL VENOUS BLD VENIPUNCTURE: CPT | Mod: PO | Performed by: NURSE PRACTITIONER

## 2021-03-26 PROCEDURE — 83036 HEMOGLOBIN GLYCOSYLATED A1C: CPT | Performed by: NURSE PRACTITIONER

## 2021-03-30 ENCOUNTER — PATIENT OUTREACH (OUTPATIENT)
Dept: ADMINISTRATIVE | Facility: OTHER | Age: 66
End: 2021-03-30

## 2021-03-31 ENCOUNTER — OFFICE VISIT (OUTPATIENT)
Dept: CARDIOLOGY | Facility: CLINIC | Age: 66
End: 2021-03-31
Payer: MEDICARE

## 2021-03-31 VITALS
BODY MASS INDEX: 41.21 KG/M2 | HEART RATE: 73 BPM | DIASTOLIC BLOOD PRESSURE: 75 MMHG | WEIGHT: 262.56 LBS | HEIGHT: 67 IN | SYSTOLIC BLOOD PRESSURE: 165 MMHG

## 2021-03-31 DIAGNOSIS — G47.33 OBSTRUCTIVE SLEEP APNEA SYNDROME: ICD-10-CM

## 2021-03-31 DIAGNOSIS — I25.10 CORONARY ARTERY DISEASE DUE TO CALCIFIED CORONARY LESION: Chronic | ICD-10-CM

## 2021-03-31 DIAGNOSIS — I70.0 ATHEROSCLEROSIS OF AORTA: ICD-10-CM

## 2021-03-31 DIAGNOSIS — I50.42 CHRONIC COMBINED SYSTOLIC AND DIASTOLIC HEART FAILURE: ICD-10-CM

## 2021-03-31 DIAGNOSIS — I81 PORTAL HYPERTENSION DUE TO OBSTRUCTION OF EXTRAHEPATIC PORTAL VEIN: Chronic | ICD-10-CM

## 2021-03-31 DIAGNOSIS — Z95.1 HX OF CABG: ICD-10-CM

## 2021-03-31 DIAGNOSIS — K76.6 PORTAL HYPERTENSION DUE TO OBSTRUCTION OF EXTRAHEPATIC PORTAL VEIN: Chronic | ICD-10-CM

## 2021-03-31 DIAGNOSIS — E66.01 MORBID OBESITY: ICD-10-CM

## 2021-03-31 DIAGNOSIS — E78.5 HYPERLIPIDEMIA ASSOCIATED WITH TYPE 2 DIABETES MELLITUS: Chronic | ICD-10-CM

## 2021-03-31 DIAGNOSIS — N18.30 STAGE 3 CHRONIC KIDNEY DISEASE, UNSPECIFIED WHETHER STAGE 3A OR 3B CKD: Chronic | ICD-10-CM

## 2021-03-31 DIAGNOSIS — Z01.810 PREOPERATIVE CARDIOVASCULAR EXAMINATION: Primary | ICD-10-CM

## 2021-03-31 DIAGNOSIS — Z79.4 TYPE 2 DIABETES MELLITUS WITH OTHER SPECIFIED COMPLICATION, WITH LONG-TERM CURRENT USE OF INSULIN: ICD-10-CM

## 2021-03-31 DIAGNOSIS — I48.0 PAROXYSMAL ATRIAL FIBRILLATION: ICD-10-CM

## 2021-03-31 DIAGNOSIS — E11.69 TYPE 2 DIABETES MELLITUS WITH OTHER SPECIFIED COMPLICATION, WITH LONG-TERM CURRENT USE OF INSULIN: ICD-10-CM

## 2021-03-31 DIAGNOSIS — E11.59 HYPERTENSION ASSOCIATED WITH DIABETES: Chronic | ICD-10-CM

## 2021-03-31 DIAGNOSIS — E11.69 HYPERLIPIDEMIA ASSOCIATED WITH TYPE 2 DIABETES MELLITUS: Chronic | ICD-10-CM

## 2021-03-31 DIAGNOSIS — I15.2 HYPERTENSION ASSOCIATED WITH DIABETES: Chronic | ICD-10-CM

## 2021-03-31 DIAGNOSIS — I25.84 CORONARY ARTERY DISEASE DUE TO CALCIFIED CORONARY LESION: Chronic | ICD-10-CM

## 2021-03-31 PROBLEM — I42.9 MYOCARDIOPATHY: Status: RESOLVED | Noted: 2019-02-07 | Resolved: 2021-03-31

## 2021-03-31 PROBLEM — I89.0 LYMPHEDEMA: Status: ACTIVE | Noted: 2019-05-23

## 2021-03-31 PROCEDURE — 99214 PR OFFICE/OUTPT VISIT, EST, LEVL IV, 30-39 MIN: ICD-10-PCS | Mod: S$GLB,,, | Performed by: INTERNAL MEDICINE

## 2021-03-31 PROCEDURE — 99214 OFFICE O/P EST MOD 30 MIN: CPT | Mod: S$GLB,,, | Performed by: INTERNAL MEDICINE

## 2021-03-31 PROCEDURE — 1125F PR PAIN SEVERITY QUANTIFIED, PAIN PRESENT: ICD-10-PCS | Mod: S$GLB,,, | Performed by: INTERNAL MEDICINE

## 2021-03-31 PROCEDURE — 3051F PR MOST RECENT HEMOGLOBIN A1C LEVEL 7.0 - < 8.0%: ICD-10-PCS | Mod: CPTII,S$GLB,, | Performed by: INTERNAL MEDICINE

## 2021-03-31 PROCEDURE — 99999 PR PBB SHADOW E&M-EST. PATIENT-LVL IV: ICD-10-PCS | Mod: PBBFAC,,, | Performed by: INTERNAL MEDICINE

## 2021-03-31 PROCEDURE — 3008F BODY MASS INDEX DOCD: CPT | Mod: CPTII,S$GLB,, | Performed by: INTERNAL MEDICINE

## 2021-03-31 PROCEDURE — 3008F PR BODY MASS INDEX (BMI) DOCUMENTED: ICD-10-PCS | Mod: CPTII,S$GLB,, | Performed by: INTERNAL MEDICINE

## 2021-03-31 PROCEDURE — 99499 RISK ADDL DX/OHS AUDIT: ICD-10-PCS | Mod: S$GLB,,, | Performed by: INTERNAL MEDICINE

## 2021-03-31 PROCEDURE — 99999 PR PBB SHADOW E&M-EST. PATIENT-LVL IV: CPT | Mod: PBBFAC,,, | Performed by: INTERNAL MEDICINE

## 2021-03-31 PROCEDURE — 1159F MED LIST DOCD IN RCRD: CPT | Mod: S$GLB,,, | Performed by: INTERNAL MEDICINE

## 2021-03-31 PROCEDURE — 3078F DIAST BP <80 MM HG: CPT | Mod: CPTII,S$GLB,, | Performed by: INTERNAL MEDICINE

## 2021-03-31 PROCEDURE — 99499 UNLISTED E&M SERVICE: CPT | Mod: S$GLB,,, | Performed by: INTERNAL MEDICINE

## 2021-03-31 PROCEDURE — 3077F SYST BP >= 140 MM HG: CPT | Mod: CPTII,S$GLB,, | Performed by: INTERNAL MEDICINE

## 2021-03-31 PROCEDURE — 1125F AMNT PAIN NOTED PAIN PRSNT: CPT | Mod: S$GLB,,, | Performed by: INTERNAL MEDICINE

## 2021-03-31 PROCEDURE — 3077F PR MOST RECENT SYSTOLIC BLOOD PRESSURE >= 140 MM HG: ICD-10-PCS | Mod: CPTII,S$GLB,, | Performed by: INTERNAL MEDICINE

## 2021-03-31 PROCEDURE — 3078F PR MOST RECENT DIASTOLIC BLOOD PRESSURE < 80 MM HG: ICD-10-PCS | Mod: CPTII,S$GLB,, | Performed by: INTERNAL MEDICINE

## 2021-03-31 PROCEDURE — 1159F PR MEDICATION LIST DOCUMENTED IN MEDICAL RECORD: ICD-10-PCS | Mod: S$GLB,,, | Performed by: INTERNAL MEDICINE

## 2021-03-31 PROCEDURE — 3051F HG A1C>EQUAL 7.0%<8.0%: CPT | Mod: CPTII,S$GLB,, | Performed by: INTERNAL MEDICINE

## 2021-03-31 RX ORDER — ASPIRIN 81 MG/1
81 TABLET ORAL DAILY
Qty: 9 TABLET | Refills: 0
Start: 2021-03-31 | End: 2023-10-18

## 2021-03-31 RX ORDER — ROSUVASTATIN CALCIUM 5 MG/1
5 TABLET, COATED ORAL DAILY
Qty: 90 TABLET | Refills: 3 | Status: SHIPPED | OUTPATIENT
Start: 2021-03-31 | End: 2022-02-24

## 2021-04-06 ENCOUNTER — TELEPHONE (OUTPATIENT)
Dept: NEUROLOGY | Facility: HOSPITAL | Age: 66
End: 2021-04-06

## 2021-04-07 ENCOUNTER — OFFICE VISIT (OUTPATIENT)
Dept: ENDOCRINOLOGY | Facility: CLINIC | Age: 66
End: 2021-04-07
Payer: MEDICARE

## 2021-04-07 VITALS
HEIGHT: 67 IN | BODY MASS INDEX: 41.52 KG/M2 | WEIGHT: 264.56 LBS | SYSTOLIC BLOOD PRESSURE: 134 MMHG | DIASTOLIC BLOOD PRESSURE: 80 MMHG

## 2021-04-07 DIAGNOSIS — E11.69 HYPERLIPIDEMIA ASSOCIATED WITH TYPE 2 DIABETES MELLITUS: Chronic | ICD-10-CM

## 2021-04-07 DIAGNOSIS — N18.30 TYPE 2 DIABETES MELLITUS WITH STAGE 3 CHRONIC KIDNEY DISEASE, WITH LONG-TERM CURRENT USE OF INSULIN, UNSPECIFIED WHETHER STAGE 3A OR 3B CKD: Primary | ICD-10-CM

## 2021-04-07 DIAGNOSIS — Z79.4 TYPE 2 DIABETES MELLITUS WITH STAGE 3 CHRONIC KIDNEY DISEASE, WITH LONG-TERM CURRENT USE OF INSULIN, UNSPECIFIED WHETHER STAGE 3A OR 3B CKD: Primary | ICD-10-CM

## 2021-04-07 DIAGNOSIS — I15.2 HYPERTENSION ASSOCIATED WITH DIABETES: Chronic | ICD-10-CM

## 2021-04-07 DIAGNOSIS — E11.22 TYPE 2 DIABETES MELLITUS WITH STAGE 3 CHRONIC KIDNEY DISEASE, WITH LONG-TERM CURRENT USE OF INSULIN, UNSPECIFIED WHETHER STAGE 3A OR 3B CKD: Primary | ICD-10-CM

## 2021-04-07 DIAGNOSIS — E78.5 HYPERLIPIDEMIA ASSOCIATED WITH TYPE 2 DIABETES MELLITUS: Chronic | ICD-10-CM

## 2021-04-07 DIAGNOSIS — E11.59 HYPERTENSION ASSOCIATED WITH DIABETES: Chronic | ICD-10-CM

## 2021-04-07 DIAGNOSIS — E55.9 VITAMIN D DEFICIENCY: ICD-10-CM

## 2021-04-07 PROCEDURE — 1126F PR PAIN SEVERITY QUANTIFIED, NO PAIN PRESENT: ICD-10-PCS | Mod: S$GLB,,, | Performed by: NURSE PRACTITIONER

## 2021-04-07 PROCEDURE — 99214 OFFICE O/P EST MOD 30 MIN: CPT | Mod: 25,S$GLB,, | Performed by: NURSE PRACTITIONER

## 2021-04-07 PROCEDURE — 95251 CONT GLUC MNTR ANALYSIS I&R: CPT | Mod: S$GLB,,, | Performed by: NURSE PRACTITIONER

## 2021-04-07 PROCEDURE — 3051F HG A1C>EQUAL 7.0%<8.0%: CPT | Mod: CPTII,S$GLB,, | Performed by: NURSE PRACTITIONER

## 2021-04-07 PROCEDURE — 1126F AMNT PAIN NOTED NONE PRSNT: CPT | Mod: S$GLB,,, | Performed by: NURSE PRACTITIONER

## 2021-04-07 PROCEDURE — 3079F PR MOST RECENT DIASTOLIC BLOOD PRESSURE 80-89 MM HG: ICD-10-PCS | Mod: CPTII,S$GLB,, | Performed by: NURSE PRACTITIONER

## 2021-04-07 PROCEDURE — 3075F SYST BP GE 130 - 139MM HG: CPT | Mod: CPTII,S$GLB,, | Performed by: NURSE PRACTITIONER

## 2021-04-07 PROCEDURE — 99214 PR OFFICE/OUTPT VISIT, EST, LEVL IV, 30-39 MIN: ICD-10-PCS | Mod: 25,S$GLB,, | Performed by: NURSE PRACTITIONER

## 2021-04-07 PROCEDURE — 1159F MED LIST DOCD IN RCRD: CPT | Mod: S$GLB,,, | Performed by: NURSE PRACTITIONER

## 2021-04-07 PROCEDURE — 3079F DIAST BP 80-89 MM HG: CPT | Mod: CPTII,S$GLB,, | Performed by: NURSE PRACTITIONER

## 2021-04-07 PROCEDURE — 99999 PR PBB SHADOW E&M-EST. PATIENT-LVL IV: ICD-10-PCS | Mod: PBBFAC,,, | Performed by: NURSE PRACTITIONER

## 2021-04-07 PROCEDURE — 3075F PR MOST RECENT SYSTOLIC BLOOD PRESS GE 130-139MM HG: ICD-10-PCS | Mod: CPTII,S$GLB,, | Performed by: NURSE PRACTITIONER

## 2021-04-07 PROCEDURE — 3008F PR BODY MASS INDEX (BMI) DOCUMENTED: ICD-10-PCS | Mod: CPTII,S$GLB,, | Performed by: NURSE PRACTITIONER

## 2021-04-07 PROCEDURE — 99999 PR PBB SHADOW E&M-EST. PATIENT-LVL IV: CPT | Mod: PBBFAC,,, | Performed by: NURSE PRACTITIONER

## 2021-04-07 PROCEDURE — 95251 PR GLUCOSE MONITOR, 72 HOUR, PHYS INTERP: ICD-10-PCS | Mod: S$GLB,,, | Performed by: NURSE PRACTITIONER

## 2021-04-07 PROCEDURE — 3051F PR MOST RECENT HEMOGLOBIN A1C LEVEL 7.0 - < 8.0%: ICD-10-PCS | Mod: CPTII,S$GLB,, | Performed by: NURSE PRACTITIONER

## 2021-04-07 PROCEDURE — 3008F BODY MASS INDEX DOCD: CPT | Mod: CPTII,S$GLB,, | Performed by: NURSE PRACTITIONER

## 2021-04-07 PROCEDURE — 1159F PR MEDICATION LIST DOCUMENTED IN MEDICAL RECORD: ICD-10-PCS | Mod: S$GLB,,, | Performed by: NURSE PRACTITIONER

## 2021-04-08 ENCOUNTER — OFFICE VISIT (OUTPATIENT)
Dept: NEUROLOGY | Facility: HOSPITAL | Age: 66
End: 2021-04-08
Attending: SURGERY
Payer: MEDICARE

## 2021-04-08 ENCOUNTER — TELEPHONE (OUTPATIENT)
Dept: DIABETES | Facility: CLINIC | Age: 66
End: 2021-04-08

## 2021-04-08 VITALS
TEMPERATURE: 98 F | DIASTOLIC BLOOD PRESSURE: 74 MMHG | RESPIRATION RATE: 20 BRPM | SYSTOLIC BLOOD PRESSURE: 134 MMHG | HEIGHT: 67 IN | BODY MASS INDEX: 41.69 KG/M2 | WEIGHT: 265.63 LBS | HEART RATE: 79 BPM

## 2021-04-08 DIAGNOSIS — E66.01 MORBID OBESITY: ICD-10-CM

## 2021-04-08 DIAGNOSIS — I25.84 CORONARY ARTERY DISEASE DUE TO CALCIFIED CORONARY LESION: Chronic | ICD-10-CM

## 2021-04-08 DIAGNOSIS — N18.30 STAGE 3 CHRONIC KIDNEY DISEASE, UNSPECIFIED WHETHER STAGE 3A OR 3B CKD: Primary | Chronic | ICD-10-CM

## 2021-04-08 DIAGNOSIS — I25.10 CORONARY ARTERY DISEASE DUE TO CALCIFIED CORONARY LESION: Chronic | ICD-10-CM

## 2021-04-08 DIAGNOSIS — R79.89 OTHER SPECIFIED ABNORMAL FINDINGS OF BLOOD CHEMISTRY: ICD-10-CM

## 2021-04-08 DIAGNOSIS — R97.8 OTHER ABNORMAL TUMOR MARKERS: ICD-10-CM

## 2021-04-08 DIAGNOSIS — I50.42 CHRONIC COMBINED SYSTOLIC AND DIASTOLIC HEART FAILURE: ICD-10-CM

## 2021-04-08 DIAGNOSIS — K86.1 OTHER CHRONIC PANCREATITIS: ICD-10-CM

## 2021-04-08 DIAGNOSIS — I81 PORTAL HYPERTENSION DUE TO OBSTRUCTION OF EXTRAHEPATIC PORTAL VEIN: Chronic | ICD-10-CM

## 2021-04-08 DIAGNOSIS — Z91.199 MEDICALLY NONCOMPLIANT: ICD-10-CM

## 2021-04-08 DIAGNOSIS — K76.6 PORTAL HYPERTENSION DUE TO OBSTRUCTION OF EXTRAHEPATIC PORTAL VEIN: Chronic | ICD-10-CM

## 2021-04-08 PROCEDURE — 99215 OFFICE O/P EST HI 40 MIN: CPT | Performed by: SURGERY

## 2021-04-09 ENCOUNTER — LAB VISIT (OUTPATIENT)
Dept: LAB | Facility: HOSPITAL | Age: 66
End: 2021-04-09
Attending: INTERNAL MEDICINE
Payer: MEDICARE

## 2021-04-09 DIAGNOSIS — K86.1 OTHER CHRONIC PANCREATITIS: ICD-10-CM

## 2021-04-09 DIAGNOSIS — R97.8 OTHER ABNORMAL TUMOR MARKERS: ICD-10-CM

## 2021-04-09 DIAGNOSIS — I25.10 CORONARY ARTERY DISEASE DUE TO CALCIFIED CORONARY LESION: Chronic | ICD-10-CM

## 2021-04-09 DIAGNOSIS — R79.89 OTHER SPECIFIED ABNORMAL FINDINGS OF BLOOD CHEMISTRY: ICD-10-CM

## 2021-04-09 DIAGNOSIS — K76.6 PORTAL HYPERTENSION DUE TO OBSTRUCTION OF EXTRAHEPATIC PORTAL VEIN: Chronic | ICD-10-CM

## 2021-04-09 DIAGNOSIS — N18.30 STAGE 3 CHRONIC KIDNEY DISEASE, UNSPECIFIED WHETHER STAGE 3A OR 3B CKD: Chronic | ICD-10-CM

## 2021-04-09 DIAGNOSIS — I81 PORTAL HYPERTENSION DUE TO OBSTRUCTION OF EXTRAHEPATIC PORTAL VEIN: Chronic | ICD-10-CM

## 2021-04-09 DIAGNOSIS — I25.84 CORONARY ARTERY DISEASE DUE TO CALCIFIED CORONARY LESION: Chronic | ICD-10-CM

## 2021-04-09 DIAGNOSIS — I50.42 CHRONIC COMBINED SYSTOLIC AND DIASTOLIC HEART FAILURE: ICD-10-CM

## 2021-04-09 LAB
ALBUMIN SERPL BCP-MCNC: 3 G/DL (ref 3.5–5.2)
ALP SERPL-CCNC: 150 U/L (ref 55–135)
ALT SERPL W/O P-5'-P-CCNC: 40 U/L (ref 10–44)
ANION GAP SERPL CALC-SCNC: 6 MMOL/L (ref 8–16)
AST SERPL-CCNC: 33 U/L (ref 10–40)
BASOPHILS # BLD AUTO: 0.03 K/UL (ref 0–0.2)
BASOPHILS NFR BLD: 0.6 % (ref 0–1.9)
BILIRUB SERPL-MCNC: 0.3 MG/DL (ref 0.1–1)
BUN SERPL-MCNC: 28 MG/DL (ref 8–23)
CALCIUM SERPL-MCNC: 8 MG/DL (ref 8.7–10.5)
CANCER AG19-9 SERPL-ACNC: 12 U/ML (ref 2–40)
CEA SERPL-MCNC: 5.8 NG/ML (ref 0–5)
CHLORIDE SERPL-SCNC: 112 MMOL/L (ref 95–110)
CO2 SERPL-SCNC: 24 MMOL/L (ref 23–29)
CREAT SERPL-MCNC: 2 MG/DL (ref 0.5–1.4)
CRP SERPL-MCNC: 0.4 MG/L (ref 0–8.2)
DIFFERENTIAL METHOD: ABNORMAL
EOSINOPHIL # BLD AUTO: 0.1 K/UL (ref 0–0.5)
EOSINOPHIL NFR BLD: 2.2 % (ref 0–8)
ERYTHROCYTE [DISTWIDTH] IN BLOOD BY AUTOMATED COUNT: 13.3 % (ref 11.5–14.5)
ERYTHROCYTE [SEDIMENTATION RATE] IN BLOOD BY WESTERGREN METHOD: 6 MM/HR (ref 0–23)
EST. GFR  (AFRICAN AMERICAN): 39 ML/MIN/1.73 M^2
EST. GFR  (NON AFRICAN AMERICAN): 33.8 ML/MIN/1.73 M^2
GLUCOSE SERPL-MCNC: 195 MG/DL (ref 70–110)
HCT VFR BLD AUTO: 36.3 % (ref 40–54)
HGB BLD-MCNC: 11.5 G/DL (ref 14–18)
IMM GRANULOCYTES # BLD AUTO: 0.01 K/UL (ref 0–0.04)
IMM GRANULOCYTES NFR BLD AUTO: 0.2 % (ref 0–0.5)
LYMPHOCYTES # BLD AUTO: 1.6 K/UL (ref 1–4.8)
LYMPHOCYTES NFR BLD: 30 % (ref 18–48)
MCH RBC QN AUTO: 30 PG (ref 27–31)
MCHC RBC AUTO-ENTMCNC: 31.7 G/DL (ref 32–36)
MCV RBC AUTO: 95 FL (ref 82–98)
MONOCYTES # BLD AUTO: 0.4 K/UL (ref 0.3–1)
MONOCYTES NFR BLD: 7.2 % (ref 4–15)
NEUTROPHILS # BLD AUTO: 3.2 K/UL (ref 1.8–7.7)
NEUTROPHILS NFR BLD: 59.8 % (ref 38–73)
NRBC BLD-RTO: 0 /100 WBC
PLATELET # BLD AUTO: 136 K/UL (ref 150–450)
PMV BLD AUTO: 12.2 FL (ref 9.2–12.9)
POTASSIUM SERPL-SCNC: 4.7 MMOL/L (ref 3.5–5.1)
PROT SERPL-MCNC: 5.9 G/DL (ref 6–8.4)
RBC # BLD AUTO: 3.83 M/UL (ref 4.6–6.2)
SODIUM SERPL-SCNC: 142 MMOL/L (ref 136–145)
WBC # BLD AUTO: 5.4 K/UL (ref 3.9–12.7)

## 2021-04-09 PROCEDURE — 80053 COMPREHEN METABOLIC PANEL: CPT | Performed by: SURGERY

## 2021-04-09 PROCEDURE — 82378 CARCINOEMBRYONIC ANTIGEN: CPT | Performed by: SURGERY

## 2021-04-09 PROCEDURE — 36415 COLL VENOUS BLD VENIPUNCTURE: CPT | Mod: PO | Performed by: SURGERY

## 2021-04-09 PROCEDURE — 85025 COMPLETE CBC W/AUTO DIFF WBC: CPT | Performed by: SURGERY

## 2021-04-09 PROCEDURE — 85652 RBC SED RATE AUTOMATED: CPT | Performed by: SURGERY

## 2021-04-09 PROCEDURE — 86301 IMMUNOASSAY TUMOR CA 19-9: CPT | Performed by: SURGERY

## 2021-04-09 PROCEDURE — 86140 C-REACTIVE PROTEIN: CPT | Performed by: SURGERY

## 2021-04-14 ENCOUNTER — TELEPHONE (OUTPATIENT)
Dept: NEUROLOGY | Facility: HOSPITAL | Age: 66
End: 2021-04-14

## 2021-04-19 ENCOUNTER — CLINICAL SUPPORT (OUTPATIENT)
Dept: DIABETES | Facility: CLINIC | Age: 66
End: 2021-04-19
Payer: MEDICARE

## 2021-04-19 ENCOUNTER — HOSPITAL ENCOUNTER (OUTPATIENT)
Dept: RADIOLOGY | Facility: HOSPITAL | Age: 66
Discharge: HOME | End: 2021-04-19
Attending: SURGERY
Payer: MEDICARE

## 2021-04-19 DIAGNOSIS — Z79.4 TYPE 2 DIABETES MELLITUS WITH STAGE 3 CHRONIC KIDNEY DISEASE, WITH LONG-TERM CURRENT USE OF INSULIN, UNSPECIFIED WHETHER STAGE 3A OR 3B CKD: ICD-10-CM

## 2021-04-19 DIAGNOSIS — N18.30 STAGE 3 CHRONIC KIDNEY DISEASE, UNSPECIFIED WHETHER STAGE 3A OR 3B CKD: ICD-10-CM

## 2021-04-19 DIAGNOSIS — E11.22 TYPE 2 DIABETES MELLITUS WITH STAGE 3 CHRONIC KIDNEY DISEASE, WITH LONG-TERM CURRENT USE OF INSULIN, UNSPECIFIED WHETHER STAGE 3A OR 3B CKD: ICD-10-CM

## 2021-04-19 DIAGNOSIS — K76.6 PORTAL HYPERTENSION DUE TO OBSTRUCTION OF EXTRAHEPATIC PORTAL VEIN: ICD-10-CM

## 2021-04-19 DIAGNOSIS — I50.42 CHRONIC COMBINED SYSTOLIC AND DIASTOLIC HEART FAILURE: ICD-10-CM

## 2021-04-19 DIAGNOSIS — I81 PORTAL HYPERTENSION DUE TO OBSTRUCTION OF EXTRAHEPATIC PORTAL VEIN: ICD-10-CM

## 2021-04-19 DIAGNOSIS — I25.84 CORONARY ARTERY DISEASE DUE TO CALCIFIED CORONARY LESION: ICD-10-CM

## 2021-04-19 DIAGNOSIS — I25.10 CORONARY ARTERY DISEASE DUE TO CALCIFIED CORONARY LESION: ICD-10-CM

## 2021-04-19 DIAGNOSIS — N18.30 TYPE 2 DIABETES MELLITUS WITH STAGE 3 CHRONIC KIDNEY DISEASE, WITH LONG-TERM CURRENT USE OF INSULIN, UNSPECIFIED WHETHER STAGE 3A OR 3B CKD: ICD-10-CM

## 2021-04-19 PROCEDURE — G0108 PR DIAB MANAGE TRN  PER INDIV: ICD-10-PCS | Mod: S$GLB,,, | Performed by: INTERNAL MEDICINE

## 2021-04-19 PROCEDURE — 74177 CT ABDOMEN PELVIS WITH CONTRAST: ICD-10-PCS | Mod: 26,,, | Performed by: RADIOLOGY

## 2021-04-19 PROCEDURE — 99999 PR PBB SHADOW E&M-EST. PATIENT-LVL I: ICD-10-PCS | Mod: PBBFAC,,,

## 2021-04-19 PROCEDURE — 74177 CT ABD & PELVIS W/CONTRAST: CPT | Mod: TC

## 2021-04-19 PROCEDURE — 25500020 PHARM REV CODE 255: Performed by: SURGERY

## 2021-04-19 PROCEDURE — G0108 DIAB MANAGE TRN  PER INDIV: HCPCS | Mod: S$GLB,,, | Performed by: INTERNAL MEDICINE

## 2021-04-19 PROCEDURE — 99999 PR PBB SHADOW E&M-EST. PATIENT-LVL I: CPT | Mod: PBBFAC,,,

## 2021-04-19 PROCEDURE — 74177 CT ABD & PELVIS W/CONTRAST: CPT | Mod: 26,,, | Performed by: RADIOLOGY

## 2021-04-19 RX ADMIN — IOHEXOL 100 ML: 350 INJECTION, SOLUTION INTRAVENOUS at 12:04

## 2021-04-28 ENCOUNTER — OFFICE VISIT (OUTPATIENT)
Dept: NEUROLOGY | Facility: HOSPITAL | Age: 66
End: 2021-04-28
Attending: INTERNAL MEDICINE
Payer: MEDICARE

## 2021-04-28 VITALS
HEIGHT: 67 IN | WEIGHT: 269.19 LBS | RESPIRATION RATE: 19 BRPM | SYSTOLIC BLOOD PRESSURE: 153 MMHG | HEART RATE: 70 BPM | BODY MASS INDEX: 42.25 KG/M2 | DIASTOLIC BLOOD PRESSURE: 83 MMHG | TEMPERATURE: 98 F

## 2021-04-28 DIAGNOSIS — Z12.11 COLON CANCER SCREENING: Primary | ICD-10-CM

## 2021-04-28 DIAGNOSIS — Z01.818 PRE-OP TESTING: ICD-10-CM

## 2021-04-28 PROCEDURE — 99215 OFFICE O/P EST HI 40 MIN: CPT | Performed by: INTERNAL MEDICINE

## 2021-04-28 RX ORDER — SODIUM, POTASSIUM,MAG SULFATES 17.5-3.13G
2 SOLUTION, RECONSTITUTED, ORAL ORAL ONCE
Qty: 1 KIT | Refills: 0 | Status: SHIPPED | OUTPATIENT
Start: 2021-04-28 | End: 2021-04-28

## 2021-05-10 ENCOUNTER — LAB VISIT (OUTPATIENT)
Dept: INTERNAL MEDICINE | Facility: CLINIC | Age: 66
End: 2021-05-10
Payer: MEDICARE

## 2021-05-10 DIAGNOSIS — Z01.818 PRE-OP TESTING: ICD-10-CM

## 2021-05-10 LAB — SARS-COV-2 RNA RESP QL NAA+PROBE: NOT DETECTED

## 2021-05-10 PROCEDURE — U0003 INFECTIOUS AGENT DETECTION BY NUCLEIC ACID (DNA OR RNA); SEVERE ACUTE RESPIRATORY SYNDROME CORONAVIRUS 2 (SARS-COV-2) (CORONAVIRUS DISEASE [COVID-19]), AMPLIFIED PROBE TECHNIQUE, MAKING USE OF HIGH THROUGHPUT TECHNOLOGIES AS DESCRIBED BY CMS-2020-01-R: HCPCS | Performed by: INTERNAL MEDICINE

## 2021-05-10 PROCEDURE — U0005 INFEC AGEN DETEC AMPLI PROBE: HCPCS | Performed by: INTERNAL MEDICINE

## 2021-05-11 ENCOUNTER — TELEPHONE (OUTPATIENT)
Dept: ENDOSCOPY | Facility: HOSPITAL | Age: 66
End: 2021-05-11

## 2021-05-11 ENCOUNTER — PATIENT MESSAGE (OUTPATIENT)
Dept: ENDOSCOPY | Facility: HOSPITAL | Age: 66
End: 2021-05-11

## 2021-05-13 ENCOUNTER — HOSPITAL ENCOUNTER (OUTPATIENT)
Facility: HOSPITAL | Age: 66
Discharge: HOME | End: 2021-05-13
Attending: INTERNAL MEDICINE | Admitting: INTERNAL MEDICINE
Payer: MEDICARE

## 2021-05-13 ENCOUNTER — ANESTHESIA (OUTPATIENT)
Dept: ENDOSCOPY | Facility: HOSPITAL | Age: 66
End: 2021-05-13
Payer: MEDICARE

## 2021-05-13 ENCOUNTER — ANESTHESIA EVENT (OUTPATIENT)
Dept: ENDOSCOPY | Facility: HOSPITAL | Age: 66
End: 2021-05-13
Payer: MEDICARE

## 2021-05-13 VITALS
WEIGHT: 263 LBS | HEART RATE: 73 BPM | SYSTOLIC BLOOD PRESSURE: 131 MMHG | TEMPERATURE: 98 F | DIASTOLIC BLOOD PRESSURE: 68 MMHG | OXYGEN SATURATION: 99 % | RESPIRATION RATE: 12 BRPM | HEIGHT: 67 IN | BODY MASS INDEX: 41.28 KG/M2

## 2021-05-13 DIAGNOSIS — K74.69 OTHER CIRRHOSIS OF LIVER: ICD-10-CM

## 2021-05-13 DIAGNOSIS — K29.70 GASTRITIS, PRESENCE OF BLEEDING UNSPECIFIED, UNSPECIFIED CHRONICITY, UNSPECIFIED GASTRITIS TYPE: ICD-10-CM

## 2021-05-13 DIAGNOSIS — K63.5 COLON POLYP: ICD-10-CM

## 2021-05-13 DIAGNOSIS — K63.5 POLYP OF COLON, UNSPECIFIED PART OF COLON, UNSPECIFIED TYPE: Primary | ICD-10-CM

## 2021-05-13 LAB — GLUCOSE SERPL-MCNC: 200 MG/DL (ref 70–110)

## 2021-05-13 PROCEDURE — 25000003 PHARM REV CODE 250: Performed by: INTERNAL MEDICINE

## 2021-05-13 PROCEDURE — 37000009 HC ANESTHESIA EA ADD 15 MINS: Performed by: INTERNAL MEDICINE

## 2021-05-13 PROCEDURE — G0105 COLORECTAL SCRN; HI RISK IND: HCPCS | Performed by: INTERNAL MEDICINE

## 2021-05-13 PROCEDURE — 63600175 PHARM REV CODE 636 W HCPCS: Performed by: NURSE ANESTHETIST, CERTIFIED REGISTERED

## 2021-05-13 PROCEDURE — 25000003 PHARM REV CODE 250: Performed by: NURSE ANESTHETIST, CERTIFIED REGISTERED

## 2021-05-13 PROCEDURE — 37000008 HC ANESTHESIA 1ST 15 MINUTES: Performed by: INTERNAL MEDICINE

## 2021-05-13 PROCEDURE — 43235 EGD DIAGNOSTIC BRUSH WASH: CPT | Performed by: INTERNAL MEDICINE

## 2021-05-13 RX ORDER — MIDAZOLAM HYDROCHLORIDE 1 MG/ML
INJECTION INTRAMUSCULAR; INTRAVENOUS
Status: DISCONTINUED | OUTPATIENT
Start: 2021-05-13 | End: 2021-05-13

## 2021-05-13 RX ORDER — LIDOCAINE HCL/PF 100 MG/5ML
SYRINGE (ML) INTRAVENOUS
Status: DISCONTINUED | OUTPATIENT
Start: 2021-05-13 | End: 2021-05-13

## 2021-05-13 RX ORDER — SODIUM CHLORIDE 0.9 % (FLUSH) 0.9 %
10 SYRINGE (ML) INJECTION
Status: DISCONTINUED | OUTPATIENT
Start: 2021-05-13 | End: 2021-05-13 | Stop reason: HOSPADM

## 2021-05-13 RX ORDER — SODIUM CHLORIDE 9 MG/ML
INJECTION, SOLUTION INTRAVENOUS CONTINUOUS
Status: DISCONTINUED | OUTPATIENT
Start: 2021-05-13 | End: 2021-05-13 | Stop reason: HOSPADM

## 2021-05-13 RX ORDER — PROPOFOL 10 MG/ML
VIAL (ML) INTRAVENOUS
Status: DISCONTINUED | OUTPATIENT
Start: 2021-05-13 | End: 2021-05-13

## 2021-05-13 RX ORDER — PROPOFOL 10 MG/ML
VIAL (ML) INTRAVENOUS CONTINUOUS PRN
Status: DISCONTINUED | OUTPATIENT
Start: 2021-05-13 | End: 2021-05-13

## 2021-05-13 RX ORDER — KETAMINE HCL IN 0.9 % NACL 50 MG/5 ML
SYRINGE (ML) INTRAVENOUS
Status: DISCONTINUED | OUTPATIENT
Start: 2021-05-13 | End: 2021-05-13

## 2021-05-13 RX ADMIN — SODIUM CHLORIDE: 0.9 INJECTION, SOLUTION INTRAVENOUS at 10:05

## 2021-05-13 RX ADMIN — TOPICAL ANESTHETIC 1 EACH: 200 SPRAY DENTAL; PERIODONTAL at 11:05

## 2021-05-13 RX ADMIN — Medication 20 MG: at 11:05

## 2021-05-13 RX ADMIN — MIDAZOLAM HYDROCHLORIDE 2 MG: 1 INJECTION, SOLUTION INTRAMUSCULAR; INTRAVENOUS at 11:05

## 2021-05-13 RX ADMIN — PROPOFOL 60 MG: 10 INJECTION, EMULSION INTRAVENOUS at 11:05

## 2021-05-13 RX ADMIN — GLYCOPYRROLATE 0.1 MG: 0.2 INJECTION, SOLUTION INTRAMUSCULAR; INTRAVITREAL at 11:05

## 2021-05-13 RX ADMIN — LIDOCAINE HYDROCHLORIDE 100 MG: 20 INJECTION, SOLUTION INTRAVENOUS at 11:05

## 2021-05-13 RX ADMIN — PROPOFOL 150 MCG/KG/MIN: 10 INJECTION, EMULSION INTRAVENOUS at 11:05

## 2021-05-14 ENCOUNTER — TELEPHONE (OUTPATIENT)
Dept: ENDOSCOPY | Facility: HOSPITAL | Age: 66
End: 2021-05-14

## 2021-05-24 ENCOUNTER — TELEPHONE (OUTPATIENT)
Dept: INTERNAL MEDICINE | Facility: CLINIC | Age: 66
End: 2021-05-24

## 2021-05-24 ENCOUNTER — OFFICE VISIT (OUTPATIENT)
Dept: UROLOGY | Facility: CLINIC | Age: 66
End: 2021-05-24
Payer: MEDICARE

## 2021-05-24 DIAGNOSIS — N43.3 BILATERAL HYDROCELE: ICD-10-CM

## 2021-05-24 DIAGNOSIS — N20.1 OBSTRUCTION OF RIGHT URETEROPELVIC JUNCTION (UPJ) DUE TO STONE: ICD-10-CM

## 2021-05-24 PROCEDURE — 99214 PR OFFICE/OUTPT VISIT, EST, LEVL IV, 30-39 MIN: ICD-10-PCS | Mod: S$GLB,,, | Performed by: UROLOGY

## 2021-05-24 PROCEDURE — 99999 PR PBB SHADOW E&M-EST. PATIENT-LVL IV: ICD-10-PCS | Mod: PBBFAC,,, | Performed by: UROLOGY

## 2021-05-24 PROCEDURE — 99999 PR PBB SHADOW E&M-EST. PATIENT-LVL IV: CPT | Mod: PBBFAC,,, | Performed by: UROLOGY

## 2021-05-24 PROCEDURE — 1159F MED LIST DOCD IN RCRD: CPT | Mod: S$GLB,,, | Performed by: UROLOGY

## 2021-05-24 PROCEDURE — 99499 RISK ADDL DX/OHS AUDIT: ICD-10-PCS | Mod: S$GLB,,, | Performed by: UROLOGY

## 2021-05-24 PROCEDURE — 99499 UNLISTED E&M SERVICE: CPT | Mod: S$GLB,,, | Performed by: UROLOGY

## 2021-05-24 PROCEDURE — 1159F PR MEDICATION LIST DOCUMENTED IN MEDICAL RECORD: ICD-10-PCS | Mod: S$GLB,,, | Performed by: UROLOGY

## 2021-05-24 PROCEDURE — 99214 OFFICE O/P EST MOD 30 MIN: CPT | Mod: S$GLB,,, | Performed by: UROLOGY

## 2021-05-26 ENCOUNTER — TELEPHONE (OUTPATIENT)
Dept: UROLOGY | Facility: CLINIC | Age: 66
End: 2021-05-26

## 2021-05-26 DIAGNOSIS — N43.3 BILATERAL HYDROCELE: Primary | ICD-10-CM

## 2021-05-27 ENCOUNTER — TELEPHONE (OUTPATIENT)
Dept: UROLOGY | Facility: CLINIC | Age: 66
End: 2021-05-27

## 2021-05-27 ENCOUNTER — HOSPITAL ENCOUNTER (OUTPATIENT)
Dept: RADIOLOGY | Facility: HOSPITAL | Age: 66
Discharge: HOME | End: 2021-05-27
Attending: UROLOGY
Payer: MEDICARE

## 2021-05-27 DIAGNOSIS — N20.0 KIDNEY STONE ON RIGHT SIDE: ICD-10-CM

## 2021-05-27 DIAGNOSIS — N43.3 BILATERAL HYDROCELE: Primary | ICD-10-CM

## 2021-05-27 DIAGNOSIS — N20.1 OBSTRUCTION OF RIGHT URETEROPELVIC JUNCTION (UPJ) DUE TO STONE: ICD-10-CM

## 2021-05-27 PROCEDURE — 74176 CT ABD & PELVIS W/O CONTRAST: CPT | Mod: 26,,, | Performed by: RADIOLOGY

## 2021-05-27 PROCEDURE — 74176 CT RENAL STONE STUDY ABD PELVIS WO: ICD-10-PCS | Mod: 26,,, | Performed by: RADIOLOGY

## 2021-05-27 PROCEDURE — 74176 CT ABD & PELVIS W/O CONTRAST: CPT | Mod: TC

## 2021-06-11 ENCOUNTER — DOCUMENTATION ONLY (OUTPATIENT)
Dept: CARDIOLOGY | Facility: CLINIC | Age: 66
End: 2021-06-11

## 2021-06-14 ENCOUNTER — TELEPHONE (OUTPATIENT)
Dept: UROLOGY | Facility: CLINIC | Age: 66
End: 2021-06-14

## 2021-06-14 ENCOUNTER — ANESTHESIA EVENT (OUTPATIENT)
Dept: SURGERY | Facility: HOSPITAL | Age: 66
End: 2021-06-14
Payer: MEDICARE

## 2021-06-15 ENCOUNTER — TELEPHONE (OUTPATIENT)
Dept: UROLOGY | Facility: CLINIC | Age: 66
End: 2021-06-15

## 2021-06-15 RX ORDER — HYDROMORPHONE HYDROCHLORIDE 1 MG/ML
0.2 INJECTION, SOLUTION INTRAMUSCULAR; INTRAVENOUS; SUBCUTANEOUS EVERY 5 MIN PRN
Status: CANCELLED | OUTPATIENT
Start: 2021-06-15

## 2021-06-16 ENCOUNTER — ANESTHESIA (OUTPATIENT)
Dept: SURGERY | Facility: HOSPITAL | Age: 66
End: 2021-06-16
Payer: MEDICARE

## 2021-06-16 ENCOUNTER — TELEPHONE (OUTPATIENT)
Dept: UROLOGY | Facility: CLINIC | Age: 66
End: 2021-06-16

## 2021-06-16 ENCOUNTER — HOSPITAL ENCOUNTER (OUTPATIENT)
Facility: HOSPITAL | Age: 66
Discharge: HOME | End: 2021-06-16
Attending: UROLOGY | Admitting: UROLOGY
Payer: MEDICARE

## 2021-06-16 VITALS
DIASTOLIC BLOOD PRESSURE: 75 MMHG | WEIGHT: 257.94 LBS | BODY MASS INDEX: 40.48 KG/M2 | TEMPERATURE: 98 F | RESPIRATION RATE: 18 BRPM | SYSTOLIC BLOOD PRESSURE: 164 MMHG | HEIGHT: 67 IN | HEART RATE: 70 BPM | OXYGEN SATURATION: 100 %

## 2021-06-16 DIAGNOSIS — N20.0 KIDNEY STONE ON RIGHT SIDE: Primary | ICD-10-CM

## 2021-06-16 DIAGNOSIS — N43.3 BILATERAL HYDROCELE: Primary | ICD-10-CM

## 2021-06-16 DIAGNOSIS — Z96.0 URETERAL STENT RETAINED: ICD-10-CM

## 2021-06-16 LAB
POCT GLUCOSE: 189 MG/DL (ref 70–110)
POCT GLUCOSE: 204 MG/DL (ref 70–110)

## 2021-06-16 PROCEDURE — D9220A PRA ANESTHESIA: Mod: ,,, | Performed by: ANESTHESIOLOGY

## 2021-06-16 PROCEDURE — 25000003 PHARM REV CODE 250: Performed by: STUDENT IN AN ORGANIZED HEALTH CARE EDUCATION/TRAINING PROGRAM

## 2021-06-16 PROCEDURE — 36000707: Performed by: UROLOGY

## 2021-06-16 PROCEDURE — 63600175 PHARM REV CODE 636 W HCPCS: Performed by: ANESTHESIOLOGY

## 2021-06-16 PROCEDURE — 52332 PR CYSTOSCOPY,INSERT URETERAL STENT: ICD-10-PCS | Mod: 51,RT,, | Performed by: UROLOGY

## 2021-06-16 PROCEDURE — 55041 REMOVAL OF HYDROCELES: CPT | Mod: ,,, | Performed by: UROLOGY

## 2021-06-16 PROCEDURE — 88302 PR  SURG PATH,LEVEL II: ICD-10-PCS | Mod: 26,,, | Performed by: STUDENT IN AN ORGANIZED HEALTH CARE EDUCATION/TRAINING PROGRAM

## 2021-06-16 PROCEDURE — 82962 GLUCOSE BLOOD TEST: CPT | Performed by: UROLOGY

## 2021-06-16 PROCEDURE — D9220A PRA ANESTHESIA: ICD-10-PCS | Mod: ,,, | Performed by: ANESTHESIOLOGY

## 2021-06-16 PROCEDURE — C2617 STENT, NON-COR, TEM W/O DEL: HCPCS | Performed by: UROLOGY

## 2021-06-16 PROCEDURE — 71000044 HC DOSC ROUTINE RECOVERY FIRST HOUR: Performed by: UROLOGY

## 2021-06-16 PROCEDURE — C1769 GUIDE WIRE: HCPCS | Performed by: UROLOGY

## 2021-06-16 PROCEDURE — 25000003 PHARM REV CODE 250: Performed by: ANESTHESIOLOGY

## 2021-06-16 PROCEDURE — 37000009 HC ANESTHESIA EA ADD 15 MINS: Performed by: UROLOGY

## 2021-06-16 PROCEDURE — 63600175 PHARM REV CODE 636 W HCPCS: Performed by: STUDENT IN AN ORGANIZED HEALTH CARE EDUCATION/TRAINING PROGRAM

## 2021-06-16 PROCEDURE — 36000706: Performed by: UROLOGY

## 2021-06-16 PROCEDURE — 71000016 HC POSTOP RECOV ADDL HR: Performed by: UROLOGY

## 2021-06-16 PROCEDURE — 37000008 HC ANESTHESIA 1ST 15 MINUTES: Performed by: UROLOGY

## 2021-06-16 PROCEDURE — 55041 PR REMOVAL OF HYDROCELE,TUNICA,BILAT: ICD-10-PCS | Mod: ,,, | Performed by: UROLOGY

## 2021-06-16 PROCEDURE — 25000003 PHARM REV CODE 250: Performed by: UROLOGY

## 2021-06-16 PROCEDURE — 88302 TISSUE EXAM BY PATHOLOGIST: CPT | Mod: 26,,, | Performed by: STUDENT IN AN ORGANIZED HEALTH CARE EDUCATION/TRAINING PROGRAM

## 2021-06-16 PROCEDURE — 71000015 HC POSTOP RECOV 1ST HR: Performed by: UROLOGY

## 2021-06-16 PROCEDURE — 71000045 HC DOSC ROUTINE RECOVERY EA ADD'L HR: Performed by: UROLOGY

## 2021-06-16 PROCEDURE — 88302 TISSUE EXAM BY PATHOLOGIST: CPT | Performed by: STUDENT IN AN ORGANIZED HEALTH CARE EDUCATION/TRAINING PROGRAM

## 2021-06-16 PROCEDURE — 52332 CYSTOSCOPY AND TREATMENT: CPT | Mod: 51,RT,, | Performed by: UROLOGY

## 2021-06-16 DEVICE — STENT URETERAL UNIV 6FR 24CM
Type: IMPLANTABLE DEVICE | Site: KIDNEY | Status: NON-FUNCTIONAL
Removed: 2021-06-30

## 2021-06-16 RX ORDER — FENTANYL CITRATE 50 UG/ML
INJECTION, SOLUTION INTRAMUSCULAR; INTRAVENOUS
Status: DISCONTINUED | OUTPATIENT
Start: 2021-06-16 | End: 2021-06-16

## 2021-06-16 RX ORDER — PHENYLEPHRINE HYDROCHLORIDE 10 MG/ML
INJECTION INTRAVENOUS
Status: DISCONTINUED | OUTPATIENT
Start: 2021-06-16 | End: 2021-06-16

## 2021-06-16 RX ORDER — ONDANSETRON 2 MG/ML
4 INJECTION INTRAMUSCULAR; INTRAVENOUS DAILY PRN
Status: DISCONTINUED | OUTPATIENT
Start: 2021-06-16 | End: 2021-06-16 | Stop reason: HOSPADM

## 2021-06-16 RX ORDER — LIDOCAINE HYDROCHLORIDE 10 MG/ML
1 INJECTION, SOLUTION EPIDURAL; INFILTRATION; INTRACAUDAL; PERINEURAL ONCE
Status: DISCONTINUED | OUTPATIENT
Start: 2021-06-16 | End: 2021-06-16 | Stop reason: HOSPADM

## 2021-06-16 RX ORDER — MIDAZOLAM HYDROCHLORIDE 1 MG/ML
INJECTION, SOLUTION INTRAMUSCULAR; INTRAVENOUS
Status: DISCONTINUED | OUTPATIENT
Start: 2021-06-16 | End: 2021-06-16

## 2021-06-16 RX ORDER — LIDOCAINE HYDROCHLORIDE 20 MG/ML
INJECTION INTRAVENOUS
Status: DISCONTINUED | OUTPATIENT
Start: 2021-06-16 | End: 2021-06-16

## 2021-06-16 RX ORDER — OXYCODONE AND ACETAMINOPHEN 5; 325 MG/1; MG/1
1 TABLET ORAL ONCE
Status: COMPLETED | OUTPATIENT
Start: 2021-06-16 | End: 2021-06-16

## 2021-06-16 RX ORDER — ACETAMINOPHEN 10 MG/ML
INJECTION, SOLUTION INTRAVENOUS
Status: DISCONTINUED | OUTPATIENT
Start: 2021-06-16 | End: 2021-06-16

## 2021-06-16 RX ORDER — BUPIVACAINE HYDROCHLORIDE 2.5 MG/ML
INJECTION, SOLUTION EPIDURAL; INFILTRATION; INTRACAUDAL
Status: DISCONTINUED | OUTPATIENT
Start: 2021-06-16 | End: 2021-06-16 | Stop reason: HOSPADM

## 2021-06-16 RX ORDER — CEFAZOLIN SODIUM 1 G/3ML
2 INJECTION, POWDER, FOR SOLUTION INTRAMUSCULAR; INTRAVENOUS
Status: COMPLETED | OUTPATIENT
Start: 2021-06-16 | End: 2021-06-16

## 2021-06-16 RX ORDER — ROCURONIUM BROMIDE 10 MG/ML
INJECTION, SOLUTION INTRAVENOUS
Status: DISCONTINUED | OUTPATIENT
Start: 2021-06-16 | End: 2021-06-16

## 2021-06-16 RX ORDER — FENTANYL CITRATE 50 UG/ML
25 INJECTION, SOLUTION INTRAMUSCULAR; INTRAVENOUS EVERY 5 MIN PRN
Status: COMPLETED | OUTPATIENT
Start: 2021-06-16 | End: 2021-06-16

## 2021-06-16 RX ORDER — FAMOTIDINE 10 MG/ML
INJECTION INTRAVENOUS
Status: DISCONTINUED | OUTPATIENT
Start: 2021-06-16 | End: 2021-06-16

## 2021-06-16 RX ORDER — ONDANSETRON 2 MG/ML
INJECTION INTRAMUSCULAR; INTRAVENOUS
Status: DISCONTINUED | OUTPATIENT
Start: 2021-06-16 | End: 2021-06-16

## 2021-06-16 RX ORDER — NEOSTIGMINE METHYLSULFATE 0.5 MG/ML
INJECTION, SOLUTION INTRAVENOUS
Status: DISCONTINUED | OUTPATIENT
Start: 2021-06-16 | End: 2021-06-16

## 2021-06-16 RX ORDER — LIDOCAINE HYDROCHLORIDE 10 MG/ML
INJECTION INFILTRATION; PERINEURAL
Status: DISCONTINUED | OUTPATIENT
Start: 2021-06-16 | End: 2021-06-16 | Stop reason: HOSPADM

## 2021-06-16 RX ORDER — SODIUM CHLORIDE 9 MG/ML
INJECTION, SOLUTION INTRAVENOUS CONTINUOUS
Status: DISCONTINUED | OUTPATIENT
Start: 2021-06-16 | End: 2021-06-16 | Stop reason: HOSPADM

## 2021-06-16 RX ORDER — TAMSULOSIN HYDROCHLORIDE 0.4 MG/1
0.4 CAPSULE ORAL DAILY
Qty: 30 CAPSULE | Refills: 1 | Status: SHIPPED | OUTPATIENT
Start: 2021-06-16 | End: 2023-07-28

## 2021-06-16 RX ORDER — OXYCODONE AND ACETAMINOPHEN 5; 325 MG/1; MG/1
1 TABLET ORAL EVERY 6 HOURS PRN
Qty: 5 TABLET | Refills: 0 | Status: SHIPPED | OUTPATIENT
Start: 2021-06-16 | End: 2022-06-14

## 2021-06-16 RX ORDER — PROPOFOL 10 MG/ML
VIAL (ML) INTRAVENOUS
Status: DISCONTINUED | OUTPATIENT
Start: 2021-06-16 | End: 2021-06-16

## 2021-06-16 RX ORDER — POLYETHYLENE GLYCOL 3350 17 G/17G
17 POWDER, FOR SOLUTION ORAL DAILY
Qty: 238 G | Refills: 0 | Status: SHIPPED | OUTPATIENT
Start: 2021-06-16 | End: 2022-06-14

## 2021-06-16 RX ADMIN — FENTANYL CITRATE 50 MCG: 50 INJECTION, SOLUTION INTRAMUSCULAR; INTRAVENOUS at 08:06

## 2021-06-16 RX ADMIN — NEOSTIGMINE METHYLSULFATE 5 MG: 0.5 INJECTION INTRAVENOUS at 10:06

## 2021-06-16 RX ADMIN — FAMOTIDINE 20 MG: 10 INJECTION, SOLUTION INTRAVENOUS at 08:06

## 2021-06-16 RX ADMIN — PROPOFOL 100 MG: 10 INJECTION, EMULSION INTRAVENOUS at 08:06

## 2021-06-16 RX ADMIN — FENTANYL CITRATE 25 MCG: 50 INJECTION INTRAMUSCULAR; INTRAVENOUS at 10:06

## 2021-06-16 RX ADMIN — ROCURONIUM BROMIDE 50 MG: 10 INJECTION, SOLUTION INTRAVENOUS at 08:06

## 2021-06-16 RX ADMIN — ONDANSETRON 4 MG: 2 INJECTION INTRAMUSCULAR; INTRAVENOUS at 09:06

## 2021-06-16 RX ADMIN — CEFAZOLIN 2 G: 330 INJECTION, POWDER, FOR SOLUTION INTRAMUSCULAR; INTRAVENOUS at 08:06

## 2021-06-16 RX ADMIN — PHENYLEPHRINE HYDROCHLORIDE 100 MCG: 10 INJECTION INTRAVENOUS at 08:06

## 2021-06-16 RX ADMIN — LIDOCAINE HYDROCHLORIDE 100 MG: 20 INJECTION, SOLUTION INTRAVENOUS at 08:06

## 2021-06-16 RX ADMIN — ACETAMINOPHEN 1000 MG: 10 INJECTION INTRAVENOUS at 09:06

## 2021-06-16 RX ADMIN — GLYCOPYRROLATE 0.6 MG: 0.2 INJECTION, SOLUTION INTRAMUSCULAR; INTRAVITREAL at 10:06

## 2021-06-16 RX ADMIN — OXYCODONE HYDROCHLORIDE AND ACETAMINOPHEN 1 TABLET: 5; 325 TABLET ORAL at 12:06

## 2021-06-16 RX ADMIN — SODIUM CHLORIDE: 0.9 INJECTION, SOLUTION INTRAVENOUS at 07:06

## 2021-06-16 RX ADMIN — GLYCOPYRROLATE 0.2 MG: 0.2 INJECTION, SOLUTION INTRAMUSCULAR; INTRAVITREAL at 08:06

## 2021-06-16 RX ADMIN — MIDAZOLAM HYDROCHLORIDE 2 MG: 1 INJECTION, SOLUTION INTRAMUSCULAR; INTRAVENOUS at 08:06

## 2021-06-21 ENCOUNTER — OFFICE VISIT (OUTPATIENT)
Dept: UROLOGY | Facility: CLINIC | Age: 66
End: 2021-06-21
Payer: MEDICARE

## 2021-06-21 ENCOUNTER — PATIENT MESSAGE (OUTPATIENT)
Dept: SURGERY | Facility: HOSPITAL | Age: 66
End: 2021-06-21

## 2021-06-21 ENCOUNTER — LAB VISIT (OUTPATIENT)
Dept: LAB | Facility: HOSPITAL | Age: 66
End: 2021-06-21
Attending: UROLOGY
Payer: MEDICARE

## 2021-06-21 VITALS
WEIGHT: 257.94 LBS | SYSTOLIC BLOOD PRESSURE: 123 MMHG | HEART RATE: 88 BPM | HEIGHT: 67 IN | DIASTOLIC BLOOD PRESSURE: 74 MMHG | BODY MASS INDEX: 40.48 KG/M2

## 2021-06-21 DIAGNOSIS — N45.1 EPIDIDYMITIS, BILATERAL: Primary | ICD-10-CM

## 2021-06-21 DIAGNOSIS — N45.1 EPIDIDYMITIS, BILATERAL: ICD-10-CM

## 2021-06-21 DIAGNOSIS — R31.29 HEMATURIA, MICROSCOPIC: ICD-10-CM

## 2021-06-21 DIAGNOSIS — S30.22XA HEMATOMA OF SCROTUM: ICD-10-CM

## 2021-06-21 DIAGNOSIS — G89.18 POST-OP PAIN: ICD-10-CM

## 2021-06-21 LAB
ERYTHROCYTE [DISTWIDTH] IN BLOOD BY AUTOMATED COUNT: 12.9 % (ref 11.5–14.5)
HCT VFR BLD AUTO: 33.9 % (ref 40–54)
HGB BLD-MCNC: 11.5 G/DL (ref 14–18)
MCH RBC QN AUTO: 31.3 PG (ref 27–31)
MCHC RBC AUTO-ENTMCNC: 33.9 G/DL (ref 32–36)
MCV RBC AUTO: 92 FL (ref 82–98)
PLATELET # BLD AUTO: 150 K/UL (ref 150–450)
PMV BLD AUTO: 11.4 FL (ref 9.2–12.9)
RBC # BLD AUTO: 3.67 M/UL (ref 4.6–6.2)
WBC # BLD AUTO: 6.72 K/UL (ref 3.9–12.7)

## 2021-06-21 PROCEDURE — 99999 PR PBB SHADOW E&M-EST. PATIENT-LVL IV: CPT | Mod: PBBFAC,,, | Performed by: UROLOGY

## 2021-06-21 PROCEDURE — 99499 NO LOS: ICD-10-PCS | Mod: S$GLB,,, | Performed by: UROLOGY

## 2021-06-21 PROCEDURE — 3288F PR FALLS RISK ASSESSMENT DOCUMENTED: ICD-10-PCS | Mod: CPTII,S$GLB,, | Performed by: UROLOGY

## 2021-06-21 PROCEDURE — 1125F AMNT PAIN NOTED PAIN PRSNT: CPT | Mod: S$GLB,,, | Performed by: UROLOGY

## 2021-06-21 PROCEDURE — 3288F FALL RISK ASSESSMENT DOCD: CPT | Mod: CPTII,S$GLB,, | Performed by: UROLOGY

## 2021-06-21 PROCEDURE — 1101F PR PT FALLS ASSESS DOC 0-1 FALLS W/OUT INJ PAST YR: ICD-10-PCS | Mod: CPTII,S$GLB,, | Performed by: UROLOGY

## 2021-06-21 PROCEDURE — 1125F PR PAIN SEVERITY QUANTIFIED, PAIN PRESENT: ICD-10-PCS | Mod: S$GLB,,, | Performed by: UROLOGY

## 2021-06-21 PROCEDURE — 1101F PT FALLS ASSESS-DOCD LE1/YR: CPT | Mod: CPTII,S$GLB,, | Performed by: UROLOGY

## 2021-06-21 PROCEDURE — 85027 COMPLETE CBC AUTOMATED: CPT | Performed by: UROLOGY

## 2021-06-21 PROCEDURE — 80053 COMPREHEN METABOLIC PANEL: CPT | Performed by: UROLOGY

## 2021-06-21 PROCEDURE — 3008F BODY MASS INDEX DOCD: CPT | Mod: CPTII,S$GLB,, | Performed by: UROLOGY

## 2021-06-21 PROCEDURE — 3008F PR BODY MASS INDEX (BMI) DOCUMENTED: ICD-10-PCS | Mod: CPTII,S$GLB,, | Performed by: UROLOGY

## 2021-06-21 PROCEDURE — 36415 COLL VENOUS BLD VENIPUNCTURE: CPT | Mod: PO | Performed by: UROLOGY

## 2021-06-21 PROCEDURE — 99499 UNLISTED E&M SERVICE: CPT | Mod: S$GLB,,, | Performed by: UROLOGY

## 2021-06-21 PROCEDURE — 99999 PR PBB SHADOW E&M-EST. PATIENT-LVL IV: ICD-10-PCS | Mod: PBBFAC,,, | Performed by: UROLOGY

## 2021-06-21 RX ORDER — DOXYCYCLINE 100 MG/1
100 CAPSULE ORAL 2 TIMES DAILY
Qty: 20 CAPSULE | Refills: 0 | Status: SHIPPED | OUTPATIENT
Start: 2021-06-21 | End: 2021-07-01

## 2021-06-21 RX ORDER — HYDROCODONE BITARTRATE AND ACETAMINOPHEN 10; 325 MG/1; MG/1
1 TABLET ORAL EVERY 4 HOURS PRN
Qty: 40 TABLET | Refills: 0 | Status: SHIPPED | OUTPATIENT
Start: 2021-08-21 | End: 2022-03-16

## 2021-06-22 ENCOUNTER — TELEPHONE (OUTPATIENT)
Dept: UROLOGY | Facility: CLINIC | Age: 66
End: 2021-06-22

## 2021-06-22 LAB
ALBUMIN SERPL BCP-MCNC: 3 G/DL (ref 3.5–5.2)
ALP SERPL-CCNC: 131 U/L (ref 55–135)
ALT SERPL W/O P-5'-P-CCNC: 21 U/L (ref 10–44)
ANION GAP SERPL CALC-SCNC: 8 MMOL/L (ref 8–16)
AST SERPL-CCNC: 24 U/L (ref 10–40)
BILIRUB SERPL-MCNC: 0.4 MG/DL (ref 0.1–1)
BUN SERPL-MCNC: 24 MG/DL (ref 8–23)
CALCIUM SERPL-MCNC: 8.5 MG/DL (ref 8.7–10.5)
CHLORIDE SERPL-SCNC: 104 MMOL/L (ref 95–110)
CO2 SERPL-SCNC: 24 MMOL/L (ref 23–29)
CREAT SERPL-MCNC: 2 MG/DL (ref 0.5–1.4)
EST. GFR  (AFRICAN AMERICAN): 39 ML/MIN/1.73 M^2
EST. GFR  (NON AFRICAN AMERICAN): 33.8 ML/MIN/1.73 M^2
GLUCOSE SERPL-MCNC: 484 MG/DL (ref 70–110)
POTASSIUM SERPL-SCNC: 5 MMOL/L (ref 3.5–5.1)
PROT SERPL-MCNC: 6.4 G/DL (ref 6–8.4)
SODIUM SERPL-SCNC: 136 MMOL/L (ref 136–145)

## 2021-06-23 LAB
FINAL PATHOLOGIC DIAGNOSIS: NORMAL
Lab: NORMAL

## 2021-06-29 ENCOUNTER — TELEPHONE (OUTPATIENT)
Dept: UROLOGY | Facility: CLINIC | Age: 66
End: 2021-06-29

## 2021-06-30 ENCOUNTER — ANESTHESIA EVENT (OUTPATIENT)
Dept: SURGERY | Facility: HOSPITAL | Age: 66
End: 2021-06-30
Payer: MEDICARE

## 2021-06-30 ENCOUNTER — ANESTHESIA (OUTPATIENT)
Dept: SURGERY | Facility: HOSPITAL | Age: 66
End: 2021-06-30
Payer: MEDICARE

## 2021-06-30 ENCOUNTER — HOSPITAL ENCOUNTER (OUTPATIENT)
Facility: HOSPITAL | Age: 66
Discharge: HOME | End: 2021-06-30
Attending: UROLOGY | Admitting: UROLOGY
Payer: MEDICARE

## 2021-06-30 VITALS
TEMPERATURE: 98 F | BODY MASS INDEX: 41.28 KG/M2 | SYSTOLIC BLOOD PRESSURE: 165 MMHG | WEIGHT: 263 LBS | OXYGEN SATURATION: 100 % | HEART RATE: 66 BPM | DIASTOLIC BLOOD PRESSURE: 63 MMHG | RESPIRATION RATE: 16 BRPM | HEIGHT: 67 IN

## 2021-06-30 DIAGNOSIS — N20.0 KIDNEY STONE: Primary | ICD-10-CM

## 2021-06-30 DIAGNOSIS — N20.1 URETERAL STONE: Primary | ICD-10-CM

## 2021-06-30 LAB — POCT GLUCOSE: 165 MG/DL (ref 70–110)

## 2021-06-30 PROCEDURE — 63600175 PHARM REV CODE 636 W HCPCS

## 2021-06-30 PROCEDURE — 63600175 PHARM REV CODE 636 W HCPCS: Performed by: NURSE ANESTHETIST, CERTIFIED REGISTERED

## 2021-06-30 PROCEDURE — 25000003 PHARM REV CODE 250: Performed by: STUDENT IN AN ORGANIZED HEALTH CARE EDUCATION/TRAINING PROGRAM

## 2021-06-30 PROCEDURE — C2617 STENT, NON-COR, TEM W/O DEL: HCPCS | Performed by: UROLOGY

## 2021-06-30 PROCEDURE — 36000706: Performed by: UROLOGY

## 2021-06-30 PROCEDURE — 71000016 HC POSTOP RECOV ADDL HR: Performed by: UROLOGY

## 2021-06-30 PROCEDURE — D9220A PRA ANESTHESIA: ICD-10-PCS | Mod: ANES,,, | Performed by: ANESTHESIOLOGY

## 2021-06-30 PROCEDURE — 27201423 OPTIME MED/SURG SUP & DEVICES STERILE SUPPLY: Performed by: UROLOGY

## 2021-06-30 PROCEDURE — C1894 INTRO/SHEATH, NON-LASER: HCPCS | Performed by: UROLOGY

## 2021-06-30 PROCEDURE — D9220A PRA ANESTHESIA: ICD-10-PCS | Mod: CRNA,,, | Performed by: NURSE ANESTHETIST, CERTIFIED REGISTERED

## 2021-06-30 PROCEDURE — 52356 PR CYSTO/URETERO W/LITHOTRIPSY: ICD-10-PCS | Mod: 79,RT,, | Performed by: UROLOGY

## 2021-06-30 PROCEDURE — 25000003 PHARM REV CODE 250: Performed by: NURSE ANESTHETIST, CERTIFIED REGISTERED

## 2021-06-30 PROCEDURE — 82962 GLUCOSE BLOOD TEST: CPT | Performed by: UROLOGY

## 2021-06-30 PROCEDURE — 37000008 HC ANESTHESIA 1ST 15 MINUTES: Performed by: UROLOGY

## 2021-06-30 PROCEDURE — 63600175 PHARM REV CODE 636 W HCPCS: Performed by: STUDENT IN AN ORGANIZED HEALTH CARE EDUCATION/TRAINING PROGRAM

## 2021-06-30 PROCEDURE — 71000015 HC POSTOP RECOV 1ST HR: Performed by: UROLOGY

## 2021-06-30 PROCEDURE — D9220A PRA ANESTHESIA: Mod: CRNA,,, | Performed by: NURSE ANESTHETIST, CERTIFIED REGISTERED

## 2021-06-30 PROCEDURE — D9220A PRA ANESTHESIA: Mod: ANES,,, | Performed by: ANESTHESIOLOGY

## 2021-06-30 PROCEDURE — 82365 CALCULUS SPECTROSCOPY: CPT | Performed by: UROLOGY

## 2021-06-30 PROCEDURE — 63600175 PHARM REV CODE 636 W HCPCS: Performed by: ANESTHESIOLOGY

## 2021-06-30 PROCEDURE — 52356 CYSTO/URETERO W/LITHOTRIPSY: CPT | Mod: 79,RT,, | Performed by: UROLOGY

## 2021-06-30 PROCEDURE — C1769 GUIDE WIRE: HCPCS | Performed by: UROLOGY

## 2021-06-30 PROCEDURE — 71000044 HC DOSC ROUTINE RECOVERY FIRST HOUR: Performed by: UROLOGY

## 2021-06-30 PROCEDURE — 37000009 HC ANESTHESIA EA ADD 15 MINS: Performed by: UROLOGY

## 2021-06-30 PROCEDURE — 25500020 PHARM REV CODE 255: Performed by: UROLOGY

## 2021-06-30 PROCEDURE — 36000707: Performed by: UROLOGY

## 2021-06-30 DEVICE — STENT URETERAL UNIV 6FR 24CM
Type: IMPLANTABLE DEVICE | Site: URETER | Status: NON-FUNCTIONAL
Removed: 2023-12-06

## 2021-06-30 RX ORDER — DEXAMETHASONE SODIUM PHOSPHATE 4 MG/ML
INJECTION, SOLUTION INTRA-ARTICULAR; INTRALESIONAL; INTRAMUSCULAR; INTRAVENOUS; SOFT TISSUE
Status: DISCONTINUED | OUTPATIENT
Start: 2021-06-30 | End: 2021-06-30

## 2021-06-30 RX ORDER — OXYCODONE HYDROCHLORIDE 5 MG/1
10 TABLET ORAL ONCE
Status: COMPLETED | OUTPATIENT
Start: 2021-06-30 | End: 2021-06-30

## 2021-06-30 RX ORDER — ROCURONIUM BROMIDE 10 MG/ML
INJECTION, SOLUTION INTRAVENOUS
Status: DISCONTINUED | OUTPATIENT
Start: 2021-06-30 | End: 2021-06-30

## 2021-06-30 RX ORDER — LIDOCAINE HYDROCHLORIDE 10 MG/ML
INJECTION, SOLUTION INTRAVENOUS
Status: DISCONTINUED | OUTPATIENT
Start: 2021-06-30 | End: 2021-06-30

## 2021-06-30 RX ORDER — FENTANYL CITRATE 50 UG/ML
INJECTION, SOLUTION INTRAMUSCULAR; INTRAVENOUS
Status: DISCONTINUED | OUTPATIENT
Start: 2021-06-30 | End: 2021-06-30

## 2021-06-30 RX ORDER — HYDROCODONE BITARTRATE AND ACETAMINOPHEN 5; 325 MG/1; MG/1
1 TABLET ORAL EVERY 6 HOURS PRN
Qty: 5 TABLET | Refills: 0 | Status: SHIPPED | OUTPATIENT
Start: 2021-06-30 | End: 2022-03-16

## 2021-06-30 RX ORDER — CEFAZOLIN SODIUM 1 G/3ML
2 INJECTION, POWDER, FOR SOLUTION INTRAMUSCULAR; INTRAVENOUS
Status: COMPLETED | OUTPATIENT
Start: 2021-06-30 | End: 2021-06-30

## 2021-06-30 RX ORDER — KETOROLAC TROMETHAMINE 30 MG/ML
30 INJECTION, SOLUTION INTRAMUSCULAR; INTRAVENOUS ONCE
Status: COMPLETED | OUTPATIENT
Start: 2021-06-30 | End: 2021-06-30

## 2021-06-30 RX ORDER — KETOROLAC TROMETHAMINE 30 MG/ML
INJECTION, SOLUTION INTRAMUSCULAR; INTRAVENOUS
Status: COMPLETED
Start: 2021-06-30 | End: 2021-06-30

## 2021-06-30 RX ORDER — HYDROMORPHONE HYDROCHLORIDE 1 MG/ML
0.2 INJECTION, SOLUTION INTRAMUSCULAR; INTRAVENOUS; SUBCUTANEOUS EVERY 5 MIN PRN
Status: DISCONTINUED | OUTPATIENT
Start: 2021-06-30 | End: 2021-06-30 | Stop reason: HOSPADM

## 2021-06-30 RX ORDER — MIDAZOLAM HYDROCHLORIDE 1 MG/ML
INJECTION, SOLUTION INTRAMUSCULAR; INTRAVENOUS
Status: DISCONTINUED | OUTPATIENT
Start: 2021-06-30 | End: 2021-06-30

## 2021-06-30 RX ORDER — ACETAMINOPHEN 10 MG/ML
INJECTION, SOLUTION INTRAVENOUS
Status: DISCONTINUED | OUTPATIENT
Start: 2021-06-30 | End: 2021-06-30

## 2021-06-30 RX ORDER — SODIUM CHLORIDE 0.9 % (FLUSH) 0.9 %
3 SYRINGE (ML) INJECTION
Status: DISCONTINUED | OUTPATIENT
Start: 2021-06-30 | End: 2021-06-30 | Stop reason: HOSPADM

## 2021-06-30 RX ORDER — PROPOFOL 10 MG/ML
VIAL (ML) INTRAVENOUS
Status: DISCONTINUED | OUTPATIENT
Start: 2021-06-30 | End: 2021-06-30

## 2021-06-30 RX ORDER — NEOSTIGMINE METHYLSULFATE 0.5 MG/ML
INJECTION, SOLUTION INTRAVENOUS
Status: DISCONTINUED | OUTPATIENT
Start: 2021-06-30 | End: 2021-06-30

## 2021-06-30 RX ORDER — ONDANSETRON 2 MG/ML
INJECTION INTRAMUSCULAR; INTRAVENOUS
Status: COMPLETED
Start: 2021-06-30 | End: 2021-06-30

## 2021-06-30 RX ORDER — OXYBUTYNIN CHLORIDE 5 MG/1
5 TABLET ORAL 3 TIMES DAILY PRN
Qty: 30 TABLET | Refills: 0 | Status: SHIPPED | OUTPATIENT
Start: 2021-06-30 | End: 2021-07-01 | Stop reason: SDUPTHER

## 2021-06-30 RX ORDER — ONDANSETRON 2 MG/ML
INJECTION INTRAMUSCULAR; INTRAVENOUS
Status: DISCONTINUED | OUTPATIENT
Start: 2021-06-30 | End: 2021-06-30

## 2021-06-30 RX ADMIN — KETOROLAC TROMETHAMINE 30 MG: 30 INJECTION, SOLUTION INTRAMUSCULAR; INTRAVENOUS at 04:06

## 2021-06-30 RX ADMIN — ACETAMINOPHEN 1000 MG: 10 INJECTION INTRAVENOUS at 02:06

## 2021-06-30 RX ADMIN — ONDANSETRON 4 MG: 2 INJECTION INTRAMUSCULAR; INTRAVENOUS at 04:06

## 2021-06-30 RX ADMIN — HYDROMORPHONE HYDROCHLORIDE 0.2 MG: 1 INJECTION, SOLUTION INTRAMUSCULAR; INTRAVENOUS; SUBCUTANEOUS at 04:06

## 2021-06-30 RX ADMIN — DEXAMETHASONE SODIUM PHOSPHATE 4 MG: 4 INJECTION, SOLUTION INTRAMUSCULAR; INTRAVENOUS at 02:06

## 2021-06-30 RX ADMIN — CEFAZOLIN 2 G: 330 INJECTION, POWDER, FOR SOLUTION INTRAMUSCULAR; INTRAVENOUS at 02:06

## 2021-06-30 RX ADMIN — MIDAZOLAM HYDROCHLORIDE 1 MG: 1 INJECTION, SOLUTION INTRAMUSCULAR; INTRAVENOUS at 02:06

## 2021-06-30 RX ADMIN — PROPOFOL 150 MG: 10 INJECTION, EMULSION INTRAVENOUS at 02:06

## 2021-06-30 RX ADMIN — ONDANSETRON 4 MG: 2 INJECTION INTRAMUSCULAR; INTRAVENOUS at 02:06

## 2021-06-30 RX ADMIN — ROCURONIUM BROMIDE 5 MG: 10 INJECTION, SOLUTION INTRAVENOUS at 02:06

## 2021-06-30 RX ADMIN — NEOSTIGMINE METHYLSULFATE 5 MG: 0.5 INJECTION INTRAVENOUS at 03:06

## 2021-06-30 RX ADMIN — SODIUM CHLORIDE: 0.9 INJECTION, SOLUTION INTRAVENOUS at 02:06

## 2021-06-30 RX ADMIN — GLYCOPYRROLATE 0.2 MG: 0.2 INJECTION, SOLUTION INTRAMUSCULAR; INTRAVITREAL at 02:06

## 2021-06-30 RX ADMIN — GLYCOPYRROLATE 0.4 MG: 0.2 INJECTION, SOLUTION INTRAMUSCULAR; INTRAVITREAL at 03:06

## 2021-06-30 RX ADMIN — ROCURONIUM BROMIDE 35 MG: 10 INJECTION, SOLUTION INTRAVENOUS at 02:06

## 2021-06-30 RX ADMIN — OXYCODONE 10 MG: 5 TABLET ORAL at 04:06

## 2021-06-30 RX ADMIN — LIDOCAINE HYDROCHLORIDE 100 MG: 10 INJECTION, SOLUTION INTRAVENOUS at 02:06

## 2021-06-30 RX ADMIN — FENTANYL CITRATE 100 MCG: 50 INJECTION, SOLUTION INTRAMUSCULAR; INTRAVENOUS at 02:06

## 2021-07-01 ENCOUNTER — PATIENT MESSAGE (OUTPATIENT)
Dept: UROLOGY | Facility: CLINIC | Age: 66
End: 2021-07-01

## 2021-07-01 RX ORDER — OXYBUTYNIN CHLORIDE 5 MG/1
5 TABLET ORAL 3 TIMES DAILY PRN
Qty: 30 TABLET | Refills: 0 | Status: SHIPPED | OUTPATIENT
Start: 2021-07-01 | End: 2022-06-14

## 2021-07-03 LAB
COMPN STONE: NORMAL
SPECIMEN SOURCE: NORMAL
STONE ANALYSIS IR-IMP: NORMAL

## 2021-07-06 ENCOUNTER — TELEPHONE (OUTPATIENT)
Dept: UROLOGY | Facility: CLINIC | Age: 66
End: 2021-07-06

## 2021-07-06 DIAGNOSIS — N20.0 CALCIUM KIDNEY STONES: ICD-10-CM

## 2021-07-06 DIAGNOSIS — N22 CALCULUS OF URINARY TRACT IN DISEASES CLASSIFIED ELSEWHERE: ICD-10-CM

## 2021-07-06 DIAGNOSIS — N20.0 URIC ACID KIDNEY STONE: Primary | ICD-10-CM

## 2021-07-06 RX ORDER — POTASSIUM CITRATE 10 MEQ/1
10 TABLET, EXTENDED RELEASE ORAL
Qty: 90 TABLET | Refills: 11 | Status: SHIPPED | OUTPATIENT
Start: 2021-07-06 | End: 2021-07-06 | Stop reason: DRUGHIGH

## 2021-07-07 ENCOUNTER — OFFICE VISIT (OUTPATIENT)
Dept: UROLOGY | Facility: CLINIC | Age: 66
End: 2021-07-07
Payer: MEDICARE

## 2021-07-07 VITALS
WEIGHT: 240.5 LBS | DIASTOLIC BLOOD PRESSURE: 66 MMHG | BODY MASS INDEX: 37.75 KG/M2 | HEART RATE: 77 BPM | SYSTOLIC BLOOD PRESSURE: 120 MMHG | HEIGHT: 67 IN

## 2021-07-07 DIAGNOSIS — S30.22XA HEMATOMA OF SCROTUM: ICD-10-CM

## 2021-07-07 DIAGNOSIS — R31.0 GROSS HEMATURIA: ICD-10-CM

## 2021-07-07 DIAGNOSIS — Z46.6 ENCOUNTER FOR REMOVAL OF URETERAL STENT: Primary | ICD-10-CM

## 2021-07-07 PROCEDURE — 1125F PR PAIN SEVERITY QUANTIFIED, PAIN PRESENT: ICD-10-PCS | Mod: S$GLB,,, | Performed by: PHYSICIAN ASSISTANT

## 2021-07-07 PROCEDURE — 99024 POSTOP FOLLOW-UP VISIT: CPT | Mod: S$GLB,,, | Performed by: PHYSICIAN ASSISTANT

## 2021-07-07 PROCEDURE — 3288F FALL RISK ASSESSMENT DOCD: CPT | Mod: CPTII,S$GLB,, | Performed by: PHYSICIAN ASSISTANT

## 2021-07-07 PROCEDURE — 99999 PR PBB SHADOW E&M-EST. PATIENT-LVL IV: ICD-10-PCS | Mod: PBBFAC,,, | Performed by: PHYSICIAN ASSISTANT

## 2021-07-07 PROCEDURE — 1101F PT FALLS ASSESS-DOCD LE1/YR: CPT | Mod: CPTII,S$GLB,, | Performed by: PHYSICIAN ASSISTANT

## 2021-07-07 PROCEDURE — 3008F PR BODY MASS INDEX (BMI) DOCUMENTED: ICD-10-PCS | Mod: CPTII,S$GLB,, | Performed by: PHYSICIAN ASSISTANT

## 2021-07-07 PROCEDURE — 3008F BODY MASS INDEX DOCD: CPT | Mod: CPTII,S$GLB,, | Performed by: PHYSICIAN ASSISTANT

## 2021-07-07 PROCEDURE — 3288F PR FALLS RISK ASSESSMENT DOCUMENTED: ICD-10-PCS | Mod: CPTII,S$GLB,, | Performed by: PHYSICIAN ASSISTANT

## 2021-07-07 PROCEDURE — 1125F AMNT PAIN NOTED PAIN PRSNT: CPT | Mod: S$GLB,,, | Performed by: PHYSICIAN ASSISTANT

## 2021-07-07 PROCEDURE — 1101F PR PT FALLS ASSESS DOC 0-1 FALLS W/OUT INJ PAST YR: ICD-10-PCS | Mod: CPTII,S$GLB,, | Performed by: PHYSICIAN ASSISTANT

## 2021-07-07 PROCEDURE — 99024 PR POST-OP FOLLOW-UP VISIT: ICD-10-PCS | Mod: S$GLB,,, | Performed by: PHYSICIAN ASSISTANT

## 2021-07-07 PROCEDURE — 99999 PR PBB SHADOW E&M-EST. PATIENT-LVL IV: CPT | Mod: PBBFAC,,, | Performed by: PHYSICIAN ASSISTANT

## 2021-07-23 RX ORDER — OXYBUTYNIN CHLORIDE 5 MG/1
5 TABLET ORAL 3 TIMES DAILY PRN
Qty: 30 TABLET | Refills: 0 | OUTPATIENT
Start: 2021-07-23

## 2021-08-02 ENCOUNTER — LAB VISIT (OUTPATIENT)
Dept: LAB | Facility: HOSPITAL | Age: 66
End: 2021-08-02
Attending: NURSE PRACTITIONER
Payer: MEDICARE

## 2021-08-02 DIAGNOSIS — Z79.4 TYPE 2 DIABETES MELLITUS WITH STAGE 3 CHRONIC KIDNEY DISEASE, WITH LONG-TERM CURRENT USE OF INSULIN, UNSPECIFIED WHETHER STAGE 3A OR 3B CKD: ICD-10-CM

## 2021-08-02 DIAGNOSIS — N18.30 TYPE 2 DIABETES MELLITUS WITH STAGE 3 CHRONIC KIDNEY DISEASE, WITH LONG-TERM CURRENT USE OF INSULIN, UNSPECIFIED WHETHER STAGE 3A OR 3B CKD: ICD-10-CM

## 2021-08-02 DIAGNOSIS — E55.9 VITAMIN D DEFICIENCY: ICD-10-CM

## 2021-08-02 DIAGNOSIS — E11.22 TYPE 2 DIABETES MELLITUS WITH STAGE 3 CHRONIC KIDNEY DISEASE, WITH LONG-TERM CURRENT USE OF INSULIN, UNSPECIFIED WHETHER STAGE 3A OR 3B CKD: ICD-10-CM

## 2021-08-02 LAB
25(OH)D3+25(OH)D2 SERPL-MCNC: 23 NG/ML (ref 30–96)
ALBUMIN SERPL BCP-MCNC: 3.2 G/DL (ref 3.5–5.2)
ALP SERPL-CCNC: 155 U/L (ref 55–135)
ALT SERPL W/O P-5'-P-CCNC: 39 U/L (ref 10–44)
ANION GAP SERPL CALC-SCNC: 11 MMOL/L (ref 8–16)
AST SERPL-CCNC: 34 U/L (ref 10–40)
BILIRUB SERPL-MCNC: 0.6 MG/DL (ref 0.1–1)
BUN SERPL-MCNC: 26 MG/DL (ref 8–23)
C PEPTIDE SERPL-MCNC: 0.67 NG/ML (ref 0.78–5.19)
CALCIUM SERPL-MCNC: 8.8 MG/DL (ref 8.7–10.5)
CHLORIDE SERPL-SCNC: 111 MMOL/L (ref 95–110)
CO2 SERPL-SCNC: 16 MMOL/L (ref 23–29)
CREAT SERPL-MCNC: 2.2 MG/DL (ref 0.5–1.4)
EST. GFR  (AFRICAN AMERICAN): 34.8 ML/MIN/1.73 M^2
EST. GFR  (NON AFRICAN AMERICAN): 30.1 ML/MIN/1.73 M^2
GLUCOSE SERPL-MCNC: 376 MG/DL (ref 70–110)
POTASSIUM SERPL-SCNC: 5 MMOL/L (ref 3.5–5.1)
PROT SERPL-MCNC: 6.5 G/DL (ref 6–8.4)
SODIUM SERPL-SCNC: 138 MMOL/L (ref 136–145)
TSH SERPL DL<=0.005 MIU/L-ACNC: 2.39 UIU/ML (ref 0.4–4)

## 2021-08-02 PROCEDURE — 83036 HEMOGLOBIN GLYCOSYLATED A1C: CPT | Performed by: NURSE PRACTITIONER

## 2021-08-02 PROCEDURE — 82306 VITAMIN D 25 HYDROXY: CPT | Performed by: NURSE PRACTITIONER

## 2021-08-02 PROCEDURE — 84443 ASSAY THYROID STIM HORMONE: CPT | Performed by: NURSE PRACTITIONER

## 2021-08-02 PROCEDURE — 36415 COLL VENOUS BLD VENIPUNCTURE: CPT | Mod: PO | Performed by: NURSE PRACTITIONER

## 2021-08-02 PROCEDURE — 84681 ASSAY OF C-PEPTIDE: CPT | Performed by: NURSE PRACTITIONER

## 2021-08-02 PROCEDURE — 80053 COMPREHEN METABOLIC PANEL: CPT | Performed by: NURSE PRACTITIONER

## 2021-08-02 PROCEDURE — 86341 ISLET CELL ANTIBODY: CPT | Performed by: NURSE PRACTITIONER

## 2021-08-03 ENCOUNTER — PATIENT MESSAGE (OUTPATIENT)
Dept: ENDOCRINOLOGY | Facility: CLINIC | Age: 66
End: 2021-08-03

## 2021-08-04 ENCOUNTER — PATIENT MESSAGE (OUTPATIENT)
Dept: ENDOCRINOLOGY | Facility: CLINIC | Age: 66
End: 2021-08-04

## 2021-08-04 LAB
ESTIMATED AVG GLUCOSE: 200 MG/DL (ref 68–131)
HBA1C MFR BLD: 8.6 % (ref 4–5.6)

## 2021-08-05 LAB — GAD65 AB SER-SCNC: 0 NMOL/L

## 2021-08-09 ENCOUNTER — OFFICE VISIT (OUTPATIENT)
Dept: ENDOCRINOLOGY | Facility: CLINIC | Age: 66
End: 2021-08-09
Payer: MEDICARE

## 2021-08-09 VITALS
SYSTOLIC BLOOD PRESSURE: 152 MMHG | BODY MASS INDEX: 39.12 KG/M2 | WEIGHT: 249.25 LBS | HEIGHT: 67 IN | DIASTOLIC BLOOD PRESSURE: 76 MMHG

## 2021-08-09 DIAGNOSIS — Z79.4 TYPE 2 DIABETES MELLITUS WITH OTHER SPECIFIED COMPLICATION, WITH LONG-TERM CURRENT USE OF INSULIN: ICD-10-CM

## 2021-08-09 DIAGNOSIS — E78.5 HYPERLIPIDEMIA ASSOCIATED WITH TYPE 2 DIABETES MELLITUS: Chronic | ICD-10-CM

## 2021-08-09 DIAGNOSIS — E55.9 VITAMIN D DEFICIENCY: Chronic | ICD-10-CM

## 2021-08-09 DIAGNOSIS — E13.9 LADA (LATENT AUTOIMMUNE DIABETES IN ADULTS), MANAGED AS TYPE 1: ICD-10-CM

## 2021-08-09 DIAGNOSIS — E11.69 HYPERLIPIDEMIA ASSOCIATED WITH TYPE 2 DIABETES MELLITUS: Chronic | ICD-10-CM

## 2021-08-09 DIAGNOSIS — E11.69 TYPE 2 DIABETES MELLITUS WITH OTHER SPECIFIED COMPLICATION, WITH LONG-TERM CURRENT USE OF INSULIN: ICD-10-CM

## 2021-08-09 DIAGNOSIS — E11.59 HYPERTENSION ASSOCIATED WITH DIABETES: Chronic | ICD-10-CM

## 2021-08-09 DIAGNOSIS — I15.2 HYPERTENSION ASSOCIATED WITH DIABETES: Chronic | ICD-10-CM

## 2021-08-09 PROCEDURE — 3008F PR BODY MASS INDEX (BMI) DOCUMENTED: ICD-10-PCS | Mod: CPTII,S$GLB,, | Performed by: NURSE PRACTITIONER

## 2021-08-09 PROCEDURE — 95251 CONT GLUC MNTR ANALYSIS I&R: CPT | Mod: S$GLB,,, | Performed by: NURSE PRACTITIONER

## 2021-08-09 PROCEDURE — 1101F PT FALLS ASSESS-DOCD LE1/YR: CPT | Mod: CPTII,S$GLB,, | Performed by: NURSE PRACTITIONER

## 2021-08-09 PROCEDURE — 95251 PR GLUCOSE MONITOR, 72 HOUR, PHYS INTERP: ICD-10-PCS | Mod: S$GLB,,, | Performed by: NURSE PRACTITIONER

## 2021-08-09 PROCEDURE — 1159F MED LIST DOCD IN RCRD: CPT | Mod: CPTII,S$GLB,, | Performed by: NURSE PRACTITIONER

## 2021-08-09 PROCEDURE — 1159F PR MEDICATION LIST DOCUMENTED IN MEDICAL RECORD: ICD-10-PCS | Mod: CPTII,S$GLB,, | Performed by: NURSE PRACTITIONER

## 2021-08-09 PROCEDURE — 1126F AMNT PAIN NOTED NONE PRSNT: CPT | Mod: CPTII,S$GLB,, | Performed by: NURSE PRACTITIONER

## 2021-08-09 PROCEDURE — 99999 PR PBB SHADOW E&M-EST. PATIENT-LVL IV: ICD-10-PCS | Mod: PBBFAC,,, | Performed by: NURSE PRACTITIONER

## 2021-08-09 PROCEDURE — 1160F PR REVIEW ALL MEDS BY PRESCRIBER/CLIN PHARMACIST DOCUMENTED: ICD-10-PCS | Mod: CPTII,S$GLB,, | Performed by: NURSE PRACTITIONER

## 2021-08-09 PROCEDURE — 1160F RVW MEDS BY RX/DR IN RCRD: CPT | Mod: CPTII,S$GLB,, | Performed by: NURSE PRACTITIONER

## 2021-08-09 PROCEDURE — 3052F HG A1C>EQUAL 8.0%<EQUAL 9.0%: CPT | Mod: CPTII,S$GLB,, | Performed by: NURSE PRACTITIONER

## 2021-08-09 PROCEDURE — 99214 PR OFFICE/OUTPT VISIT, EST, LEVL IV, 30-39 MIN: ICD-10-PCS | Mod: 25,S$GLB,, | Performed by: NURSE PRACTITIONER

## 2021-08-09 PROCEDURE — 1126F PR PAIN SEVERITY QUANTIFIED, NO PAIN PRESENT: ICD-10-PCS | Mod: CPTII,S$GLB,, | Performed by: NURSE PRACTITIONER

## 2021-08-09 PROCEDURE — 3078F PR MOST RECENT DIASTOLIC BLOOD PRESSURE < 80 MM HG: ICD-10-PCS | Mod: CPTII,S$GLB,, | Performed by: NURSE PRACTITIONER

## 2021-08-09 PROCEDURE — 99214 OFFICE O/P EST MOD 30 MIN: CPT | Mod: 25,S$GLB,, | Performed by: NURSE PRACTITIONER

## 2021-08-09 PROCEDURE — 3077F PR MOST RECENT SYSTOLIC BLOOD PRESSURE >= 140 MM HG: ICD-10-PCS | Mod: CPTII,S$GLB,, | Performed by: NURSE PRACTITIONER

## 2021-08-09 PROCEDURE — 3052F PR MOST RECENT HEMOGLOBIN A1C LEVEL 8.0 - < 9.0%: ICD-10-PCS | Mod: CPTII,S$GLB,, | Performed by: NURSE PRACTITIONER

## 2021-08-09 PROCEDURE — 3077F SYST BP >= 140 MM HG: CPT | Mod: CPTII,S$GLB,, | Performed by: NURSE PRACTITIONER

## 2021-08-09 PROCEDURE — 99999 PR PBB SHADOW E&M-EST. PATIENT-LVL IV: CPT | Mod: PBBFAC,,, | Performed by: NURSE PRACTITIONER

## 2021-08-09 PROCEDURE — 3008F BODY MASS INDEX DOCD: CPT | Mod: CPTII,S$GLB,, | Performed by: NURSE PRACTITIONER

## 2021-08-09 PROCEDURE — 1101F PR PT FALLS ASSESS DOC 0-1 FALLS W/OUT INJ PAST YR: ICD-10-PCS | Mod: CPTII,S$GLB,, | Performed by: NURSE PRACTITIONER

## 2021-08-09 PROCEDURE — 3288F PR FALLS RISK ASSESSMENT DOCUMENTED: ICD-10-PCS | Mod: CPTII,S$GLB,, | Performed by: NURSE PRACTITIONER

## 2021-08-09 PROCEDURE — 3078F DIAST BP <80 MM HG: CPT | Mod: CPTII,S$GLB,, | Performed by: NURSE PRACTITIONER

## 2021-08-09 PROCEDURE — 3288F FALL RISK ASSESSMENT DOCD: CPT | Mod: CPTII,S$GLB,, | Performed by: NURSE PRACTITIONER

## 2021-08-10 ENCOUNTER — TELEPHONE (OUTPATIENT)
Dept: INTERNAL MEDICINE | Facility: CLINIC | Age: 66
End: 2021-08-10

## 2021-08-16 NOTE — PROGRESS NOTES
Subjective:       Patient ID: Jian Arrieta is a 61 y.o. male.    Chief Complaint: Insect Bite (happened x4 days ago.)    Insect Bite   This is a new problem. The current episode started in the past 7 days. The problem occurs constantly. The problem has been unchanged. Pertinent negatives include no abdominal pain, arthralgias, chest pain, chills, congestion, coughing, fever, headaches, nausea, sore throat, vomiting or weakness. Nothing aggravates the symptoms. He has tried nothing for the symptoms. The treatment provided no relief.     Review of Systems   Constitutional: Negative for activity change, appetite change, chills and fever.   HENT: Negative for congestion, postnasal drip and sore throat.    Respiratory: Negative for cough, shortness of breath and wheezing.    Cardiovascular: Negative for chest pain and palpitations.   Gastrointestinal: Negative for abdominal pain, blood in stool, constipation, diarrhea, nausea and vomiting.   Genitourinary: Negative for decreased urine volume, difficulty urinating, flank pain and frequency.   Musculoskeletal: Negative for arthralgias.   Neurological: Negative for dizziness, weakness and headaches.       Objective:      Physical Exam   Skin:            Assessment:       1. Spider bite wound, accidental or unintentional, initial encounter        Plan:   Jian was seen today for insect bite.    Diagnoses and all orders for this visit:    Spider bite wound, accidental or unintentional, initial encounter    Other orders  -     sulfamethoxazole-trimethoprim 800-160mg (BACTRIM DS) 800-160 mg Tab; Take 1 tablet by mouth 2 (two) times daily.       Acitretin Counseling:  I discussed with the patient the risks of acitretin including but not limited to hair loss, dry lips/skin/eyes, liver damage, hyperlipidemia, depression/suicidal ideation, photosensitivity.  Serious rare side effects can include but are not limited to pancreatitis, pseudotumor cerebri, bony changes, clot formation/stroke/heart attack.  Patient understands that alcohol is contraindicated since it can result in liver toxicity and significantly prolong the elimination of the drug by many years.

## 2021-08-23 ENCOUNTER — CLINICAL SUPPORT (OUTPATIENT)
Dept: DIABETES | Facility: CLINIC | Age: 66
End: 2021-08-23
Payer: MEDICARE

## 2021-08-23 DIAGNOSIS — Z79.4 TYPE 2 DIABETES MELLITUS WITH OTHER SPECIFIED COMPLICATION, WITH LONG-TERM CURRENT USE OF INSULIN: ICD-10-CM

## 2021-08-23 DIAGNOSIS — E11.69 TYPE 2 DIABETES MELLITUS WITH OTHER SPECIFIED COMPLICATION, WITH LONG-TERM CURRENT USE OF INSULIN: ICD-10-CM

## 2021-08-23 PROCEDURE — 99999 PR PBB SHADOW E&M-EST. PATIENT-LVL I: CPT | Mod: PBBFAC,,,

## 2021-08-23 PROCEDURE — G0108 DIAB MANAGE TRN  PER INDIV: HCPCS | Mod: S$GLB,,, | Performed by: INTERNAL MEDICINE

## 2021-08-23 PROCEDURE — G0108 PR DIAB MANAGE TRN  PER INDIV: ICD-10-PCS | Mod: S$GLB,,, | Performed by: INTERNAL MEDICINE

## 2021-08-23 PROCEDURE — 99999 PR PBB SHADOW E&M-EST. PATIENT-LVL I: ICD-10-PCS | Mod: PBBFAC,,,

## 2021-09-20 ENCOUNTER — LAB VISIT (OUTPATIENT)
Dept: LAB | Facility: HOSPITAL | Age: 66
End: 2021-09-20
Attending: UROLOGY
Payer: MEDICARE

## 2021-09-20 DIAGNOSIS — Z79.4 TYPE 2 DIABETES MELLITUS WITH OTHER SPECIFIED COMPLICATION, WITH LONG-TERM CURRENT USE OF INSULIN: ICD-10-CM

## 2021-09-20 DIAGNOSIS — N22 CALCULUS OF URINARY TRACT IN DISEASES CLASSIFIED ELSEWHERE: ICD-10-CM

## 2021-09-20 DIAGNOSIS — E11.69 TYPE 2 DIABETES MELLITUS WITH OTHER SPECIFIED COMPLICATION, WITH LONG-TERM CURRENT USE OF INSULIN: ICD-10-CM

## 2021-09-20 LAB
ALBUMIN SERPL BCP-MCNC: 3.2 G/DL (ref 3.5–5.2)
ALP SERPL-CCNC: 145 U/L (ref 55–135)
ALT SERPL W/O P-5'-P-CCNC: 49 U/L (ref 10–44)
ANION GAP SERPL CALC-SCNC: 11 MMOL/L (ref 8–16)
ANION GAP SERPL CALC-SCNC: 11 MMOL/L (ref 8–16)
AST SERPL-CCNC: 39 U/L (ref 10–40)
BILIRUB SERPL-MCNC: 0.4 MG/DL (ref 0.1–1)
BUN SERPL-MCNC: 30 MG/DL (ref 8–23)
BUN SERPL-MCNC: 30 MG/DL (ref 8–23)
CALCIUM SERPL-MCNC: 8.6 MG/DL (ref 8.7–10.5)
CALCIUM SERPL-MCNC: 8.6 MG/DL (ref 8.7–10.5)
CHLORIDE SERPL-SCNC: 107 MMOL/L (ref 95–110)
CHLORIDE SERPL-SCNC: 107 MMOL/L (ref 95–110)
CO2 SERPL-SCNC: 22 MMOL/L (ref 23–29)
CO2 SERPL-SCNC: 22 MMOL/L (ref 23–29)
CREAT SERPL-MCNC: 2.1 MG/DL (ref 0.5–1.4)
CREAT SERPL-MCNC: 2.1 MG/DL (ref 0.5–1.4)
EST. GFR  (AFRICAN AMERICAN): 36.6 ML/MIN/1.73 M^2
EST. GFR  (AFRICAN AMERICAN): 36.6 ML/MIN/1.73 M^2
EST. GFR  (NON AFRICAN AMERICAN): 31.6 ML/MIN/1.73 M^2
EST. GFR  (NON AFRICAN AMERICAN): 31.6 ML/MIN/1.73 M^2
ESTIMATED AVG GLUCOSE: 192 MG/DL (ref 68–131)
GLUCOSE SERPL-MCNC: 152 MG/DL (ref 70–110)
GLUCOSE SERPL-MCNC: 152 MG/DL (ref 70–110)
HBA1C MFR BLD: 8.3 % (ref 4–5.6)
POTASSIUM SERPL-SCNC: 4.9 MMOL/L (ref 3.5–5.1)
POTASSIUM SERPL-SCNC: 4.9 MMOL/L (ref 3.5–5.1)
PROT SERPL-MCNC: 6.1 G/DL (ref 6–8.4)
SODIUM SERPL-SCNC: 140 MMOL/L (ref 136–145)
SODIUM SERPL-SCNC: 140 MMOL/L (ref 136–145)
URATE SERPL-MCNC: 4.6 MG/DL (ref 3.4–7)

## 2021-09-20 PROCEDURE — 84550 ASSAY OF BLOOD/URIC ACID: CPT | Performed by: UROLOGY

## 2021-09-20 PROCEDURE — 83036 HEMOGLOBIN GLYCOSYLATED A1C: CPT | Performed by: NURSE PRACTITIONER

## 2021-09-20 PROCEDURE — 80053 COMPREHEN METABOLIC PANEL: CPT | Performed by: NURSE PRACTITIONER

## 2021-09-20 PROCEDURE — 36415 COLL VENOUS BLD VENIPUNCTURE: CPT | Mod: PO | Performed by: UROLOGY

## 2021-09-21 ENCOUNTER — PATIENT MESSAGE (OUTPATIENT)
Dept: ENDOCRINOLOGY | Facility: CLINIC | Age: 66
End: 2021-09-21

## 2021-09-21 ENCOUNTER — OFFICE VISIT (OUTPATIENT)
Dept: INTERNAL MEDICINE | Facility: CLINIC | Age: 66
End: 2021-09-21
Payer: MEDICARE

## 2021-09-21 VITALS
RESPIRATION RATE: 16 BRPM | WEIGHT: 250.44 LBS | TEMPERATURE: 97 F | DIASTOLIC BLOOD PRESSURE: 82 MMHG | OXYGEN SATURATION: 99 % | BODY MASS INDEX: 39.31 KG/M2 | HEIGHT: 67 IN | HEART RATE: 78 BPM | SYSTOLIC BLOOD PRESSURE: 140 MMHG

## 2021-09-21 DIAGNOSIS — E13.9 LADA (LATENT AUTOIMMUNE DIABETES IN ADULTS), MANAGED AS TYPE 1: Primary | ICD-10-CM

## 2021-09-21 DIAGNOSIS — I89.0 LYMPHEDEMA OF BOTH LOWER EXTREMITIES: Chronic | ICD-10-CM

## 2021-09-21 DIAGNOSIS — N18.30 STAGE 3 CHRONIC KIDNEY DISEASE, UNSPECIFIED WHETHER STAGE 3A OR 3B CKD: Chronic | ICD-10-CM

## 2021-09-21 DIAGNOSIS — I50.42 CHRONIC COMBINED SYSTOLIC AND DIASTOLIC HEART FAILURE: ICD-10-CM

## 2021-09-21 DIAGNOSIS — I48.0 PAROXYSMAL ATRIAL FIBRILLATION: ICD-10-CM

## 2021-09-21 DIAGNOSIS — E11.22 TYPE 2 DIABETES MELLITUS WITH STAGE 3 CHRONIC KIDNEY DISEASE, WITH LONG-TERM CURRENT USE OF INSULIN, UNSPECIFIED WHETHER STAGE 3A OR 3B CKD: ICD-10-CM

## 2021-09-21 DIAGNOSIS — Z79.4 TYPE 2 DIABETES MELLITUS WITH STAGE 3 CHRONIC KIDNEY DISEASE, WITH LONG-TERM CURRENT USE OF INSULIN, UNSPECIFIED WHETHER STAGE 3A OR 3B CKD: ICD-10-CM

## 2021-09-21 DIAGNOSIS — E11.59 HYPERTENSION ASSOCIATED WITH DIABETES: Chronic | ICD-10-CM

## 2021-09-21 DIAGNOSIS — E78.5 HYPERLIPIDEMIA ASSOCIATED WITH TYPE 2 DIABETES MELLITUS: Chronic | ICD-10-CM

## 2021-09-21 DIAGNOSIS — E11.69 HYPERLIPIDEMIA ASSOCIATED WITH TYPE 2 DIABETES MELLITUS: Chronic | ICD-10-CM

## 2021-09-21 DIAGNOSIS — N18.30 TYPE 2 DIABETES MELLITUS WITH STAGE 3 CHRONIC KIDNEY DISEASE, WITH LONG-TERM CURRENT USE OF INSULIN, UNSPECIFIED WHETHER STAGE 3A OR 3B CKD: ICD-10-CM

## 2021-09-21 DIAGNOSIS — R26.89 DECREASED MOBILITY: ICD-10-CM

## 2021-09-21 DIAGNOSIS — I15.2 HYPERTENSION ASSOCIATED WITH DIABETES: Chronic | ICD-10-CM

## 2021-09-21 DIAGNOSIS — I89.0 LYMPHEDEMA: ICD-10-CM

## 2021-09-21 DIAGNOSIS — Z95.1 HX OF CABG: ICD-10-CM

## 2021-09-21 PROCEDURE — 1101F PR PT FALLS ASSESS DOC 0-1 FALLS W/OUT INJ PAST YR: ICD-10-PCS | Mod: CPTII,S$GLB,, | Performed by: NURSE PRACTITIONER

## 2021-09-21 PROCEDURE — 3062F PR POS MACROALBUMINURIA RESULT DOCUMENTED/REVIEW: ICD-10-PCS | Mod: CPTII,S$GLB,, | Performed by: NURSE PRACTITIONER

## 2021-09-21 PROCEDURE — 3288F PR FALLS RISK ASSESSMENT DOCUMENTED: ICD-10-PCS | Mod: CPTII,S$GLB,, | Performed by: NURSE PRACTITIONER

## 2021-09-21 PROCEDURE — 99999 PR PBB SHADOW E&M-EST. PATIENT-LVL V: CPT | Mod: PBBFAC,,, | Performed by: NURSE PRACTITIONER

## 2021-09-21 PROCEDURE — 3077F PR MOST RECENT SYSTOLIC BLOOD PRESSURE >= 140 MM HG: ICD-10-PCS | Mod: CPTII,S$GLB,, | Performed by: NURSE PRACTITIONER

## 2021-09-21 PROCEDURE — 3077F SYST BP >= 140 MM HG: CPT | Mod: CPTII,S$GLB,, | Performed by: NURSE PRACTITIONER

## 2021-09-21 PROCEDURE — 1126F PR PAIN SEVERITY QUANTIFIED, NO PAIN PRESENT: ICD-10-PCS | Mod: CPTII,S$GLB,, | Performed by: NURSE PRACTITIONER

## 2021-09-21 PROCEDURE — 1101F PT FALLS ASSESS-DOCD LE1/YR: CPT | Mod: CPTII,S$GLB,, | Performed by: NURSE PRACTITIONER

## 2021-09-21 PROCEDURE — 3052F PR MOST RECENT HEMOGLOBIN A1C LEVEL 8.0 - < 9.0%: ICD-10-PCS | Mod: CPTII,S$GLB,, | Performed by: NURSE PRACTITIONER

## 2021-09-21 PROCEDURE — 1159F PR MEDICATION LIST DOCUMENTED IN MEDICAL RECORD: ICD-10-PCS | Mod: CPTII,S$GLB,, | Performed by: NURSE PRACTITIONER

## 2021-09-21 PROCEDURE — 3052F HG A1C>EQUAL 8.0%<EQUAL 9.0%: CPT | Mod: CPTII,S$GLB,, | Performed by: NURSE PRACTITIONER

## 2021-09-21 PROCEDURE — 99499 RISK ADDL DX/OHS AUDIT: ICD-10-PCS | Mod: S$GLB,,, | Performed by: NURSE PRACTITIONER

## 2021-09-21 PROCEDURE — 3008F PR BODY MASS INDEX (BMI) DOCUMENTED: ICD-10-PCS | Mod: CPTII,S$GLB,, | Performed by: NURSE PRACTITIONER

## 2021-09-21 PROCEDURE — 1126F AMNT PAIN NOTED NONE PRSNT: CPT | Mod: CPTII,S$GLB,, | Performed by: NURSE PRACTITIONER

## 2021-09-21 PROCEDURE — 95251 PR GLUCOSE MONITOR, 72 HOUR, PHYS INTERP: ICD-10-PCS | Mod: S$GLB,,, | Performed by: NURSE PRACTITIONER

## 2021-09-21 PROCEDURE — 95251 CONT GLUC MNTR ANALYSIS I&R: CPT | Mod: S$GLB,,, | Performed by: NURSE PRACTITIONER

## 2021-09-21 PROCEDURE — 3066F NEPHROPATHY DOC TX: CPT | Mod: CPTII,S$GLB,, | Performed by: NURSE PRACTITIONER

## 2021-09-21 PROCEDURE — 3288F FALL RISK ASSESSMENT DOCD: CPT | Mod: CPTII,S$GLB,, | Performed by: NURSE PRACTITIONER

## 2021-09-21 PROCEDURE — 3066F PR DOCUMENTATION OF TREATMENT FOR NEPHROPATHY: ICD-10-PCS | Mod: CPTII,S$GLB,, | Performed by: NURSE PRACTITIONER

## 2021-09-21 PROCEDURE — 99215 PR OFFICE/OUTPT VISIT, EST, LEVL V, 40-54 MIN: ICD-10-PCS | Mod: S$GLB,,, | Performed by: NURSE PRACTITIONER

## 2021-09-21 PROCEDURE — 99215 OFFICE O/P EST HI 40 MIN: CPT | Mod: S$GLB,,, | Performed by: NURSE PRACTITIONER

## 2021-09-21 PROCEDURE — 3079F PR MOST RECENT DIASTOLIC BLOOD PRESSURE 80-89 MM HG: ICD-10-PCS | Mod: CPTII,S$GLB,, | Performed by: NURSE PRACTITIONER

## 2021-09-21 PROCEDURE — 3062F POS MACROALBUMINURIA REV: CPT | Mod: CPTII,S$GLB,, | Performed by: NURSE PRACTITIONER

## 2021-09-21 PROCEDURE — 99499 UNLISTED E&M SERVICE: CPT | Mod: S$GLB,,, | Performed by: NURSE PRACTITIONER

## 2021-09-21 PROCEDURE — 99999 PR PBB SHADOW E&M-EST. PATIENT-LVL V: ICD-10-PCS | Mod: PBBFAC,,, | Performed by: NURSE PRACTITIONER

## 2021-09-21 PROCEDURE — 3079F DIAST BP 80-89 MM HG: CPT | Mod: CPTII,S$GLB,, | Performed by: NURSE PRACTITIONER

## 2021-09-21 PROCEDURE — 3008F BODY MASS INDEX DOCD: CPT | Mod: CPTII,S$GLB,, | Performed by: NURSE PRACTITIONER

## 2021-09-21 PROCEDURE — 1159F MED LIST DOCD IN RCRD: CPT | Mod: CPTII,S$GLB,, | Performed by: NURSE PRACTITIONER

## 2021-09-27 ENCOUNTER — OFFICE VISIT (OUTPATIENT)
Dept: UROLOGY | Facility: CLINIC | Age: 66
End: 2021-09-27
Payer: MEDICARE

## 2021-09-27 ENCOUNTER — PATIENT OUTREACH (OUTPATIENT)
Dept: ADMINISTRATIVE | Facility: OTHER | Age: 66
End: 2021-09-27

## 2021-09-27 VITALS
BODY MASS INDEX: 39.13 KG/M2 | DIASTOLIC BLOOD PRESSURE: 71 MMHG | WEIGHT: 249.31 LBS | SYSTOLIC BLOOD PRESSURE: 124 MMHG | HEART RATE: 84 BPM | HEIGHT: 67 IN

## 2021-09-27 DIAGNOSIS — N40.0 BPH WITHOUT OBSTRUCTION/LOWER URINARY TRACT SYMPTOMS: ICD-10-CM

## 2021-09-27 DIAGNOSIS — N20.0 KIDNEY STONE: ICD-10-CM

## 2021-09-27 DIAGNOSIS — N52.9 ED (ERECTILE DYSFUNCTION) OF ORGANIC ORIGIN: Primary | ICD-10-CM

## 2021-09-27 DIAGNOSIS — N43.3 BILATERAL HYDROCELE: ICD-10-CM

## 2021-09-27 DIAGNOSIS — N22 CALCULUS OF URINARY TRACT IN DISEASES CLASSIFIED ELSEWHERE: ICD-10-CM

## 2021-09-27 PROCEDURE — 3078F DIAST BP <80 MM HG: CPT | Mod: CPTII,S$GLB,, | Performed by: UROLOGY

## 2021-09-27 PROCEDURE — 99999 PR PBB SHADOW E&M-EST. PATIENT-LVL IV: ICD-10-PCS | Mod: PBBFAC,,, | Performed by: UROLOGY

## 2021-09-27 PROCEDURE — 99999 PR PBB SHADOW E&M-EST. PATIENT-LVL IV: CPT | Mod: PBBFAC,,, | Performed by: UROLOGY

## 2021-09-27 PROCEDURE — 1159F MED LIST DOCD IN RCRD: CPT | Mod: CPTII,S$GLB,, | Performed by: UROLOGY

## 2021-09-27 PROCEDURE — 3288F FALL RISK ASSESSMENT DOCD: CPT | Mod: CPTII,S$GLB,, | Performed by: UROLOGY

## 2021-09-27 PROCEDURE — 1126F AMNT PAIN NOTED NONE PRSNT: CPT | Mod: CPTII,S$GLB,, | Performed by: UROLOGY

## 2021-09-27 PROCEDURE — 3288F PR FALLS RISK ASSESSMENT DOCUMENTED: ICD-10-PCS | Mod: CPTII,S$GLB,, | Performed by: UROLOGY

## 2021-09-27 PROCEDURE — 1101F PR PT FALLS ASSESS DOC 0-1 FALLS W/OUT INJ PAST YR: ICD-10-PCS | Mod: CPTII,S$GLB,, | Performed by: UROLOGY

## 2021-09-27 PROCEDURE — 3078F PR MOST RECENT DIASTOLIC BLOOD PRESSURE < 80 MM HG: ICD-10-PCS | Mod: CPTII,S$GLB,, | Performed by: UROLOGY

## 2021-09-27 PROCEDURE — 3062F PR POS MACROALBUMINURIA RESULT DOCUMENTED/REVIEW: ICD-10-PCS | Mod: CPTII,S$GLB,, | Performed by: UROLOGY

## 2021-09-27 PROCEDURE — 3008F PR BODY MASS INDEX (BMI) DOCUMENTED: ICD-10-PCS | Mod: CPTII,S$GLB,, | Performed by: UROLOGY

## 2021-09-27 PROCEDURE — 1101F PT FALLS ASSESS-DOCD LE1/YR: CPT | Mod: CPTII,S$GLB,, | Performed by: UROLOGY

## 2021-09-27 PROCEDURE — 3052F PR MOST RECENT HEMOGLOBIN A1C LEVEL 8.0 - < 9.0%: ICD-10-PCS | Mod: CPTII,S$GLB,, | Performed by: UROLOGY

## 2021-09-27 PROCEDURE — 1126F PR PAIN SEVERITY QUANTIFIED, NO PAIN PRESENT: ICD-10-PCS | Mod: CPTII,S$GLB,, | Performed by: UROLOGY

## 2021-09-27 PROCEDURE — 1159F PR MEDICATION LIST DOCUMENTED IN MEDICAL RECORD: ICD-10-PCS | Mod: CPTII,S$GLB,, | Performed by: UROLOGY

## 2021-09-27 PROCEDURE — 3008F BODY MASS INDEX DOCD: CPT | Mod: CPTII,S$GLB,, | Performed by: UROLOGY

## 2021-09-27 PROCEDURE — 99214 OFFICE O/P EST MOD 30 MIN: CPT | Mod: S$GLB,,, | Performed by: UROLOGY

## 2021-09-27 PROCEDURE — 3062F POS MACROALBUMINURIA REV: CPT | Mod: CPTII,S$GLB,, | Performed by: UROLOGY

## 2021-09-27 PROCEDURE — 3074F SYST BP LT 130 MM HG: CPT | Mod: CPTII,S$GLB,, | Performed by: UROLOGY

## 2021-09-27 PROCEDURE — 99214 PR OFFICE/OUTPT VISIT, EST, LEVL IV, 30-39 MIN: ICD-10-PCS | Mod: S$GLB,,, | Performed by: UROLOGY

## 2021-09-27 PROCEDURE — 3074F PR MOST RECENT SYSTOLIC BLOOD PRESSURE < 130 MM HG: ICD-10-PCS | Mod: CPTII,S$GLB,, | Performed by: UROLOGY

## 2021-09-27 PROCEDURE — 3052F HG A1C>EQUAL 8.0%<EQUAL 9.0%: CPT | Mod: CPTII,S$GLB,, | Performed by: UROLOGY

## 2021-09-27 PROCEDURE — 3066F NEPHROPATHY DOC TX: CPT | Mod: CPTII,S$GLB,, | Performed by: UROLOGY

## 2021-09-27 PROCEDURE — 3066F PR DOCUMENTATION OF TREATMENT FOR NEPHROPATHY: ICD-10-PCS | Mod: CPTII,S$GLB,, | Performed by: UROLOGY

## 2021-09-27 RX ORDER — SILDENAFIL 100 MG/1
100 TABLET, FILM COATED ORAL DAILY PRN
Qty: 30 TABLET | Refills: 3 | Status: SHIPPED | OUTPATIENT
Start: 2021-09-27 | End: 2021-12-21 | Stop reason: SDUPTHER

## 2021-11-29 ENCOUNTER — PATIENT MESSAGE (OUTPATIENT)
Dept: INTERNAL MEDICINE | Facility: CLINIC | Age: 66
End: 2021-11-29
Payer: MEDICARE

## 2021-11-29 RX ORDER — AMOXICILLIN AND CLAVULANATE POTASSIUM 500; 125 MG/1; MG/1
1 TABLET, FILM COATED ORAL 2 TIMES DAILY
Qty: 20 TABLET | Refills: 0 | Status: SHIPPED | OUTPATIENT
Start: 2021-11-29 | End: 2022-03-16

## 2021-12-16 ENCOUNTER — LAB VISIT (OUTPATIENT)
Dept: LAB | Facility: HOSPITAL | Age: 66
End: 2021-12-16
Attending: NURSE PRACTITIONER
Payer: MEDICARE

## 2021-12-16 DIAGNOSIS — E13.9 LADA (LATENT AUTOIMMUNE DIABETES IN ADULTS), MANAGED AS TYPE 1: ICD-10-CM

## 2021-12-16 DIAGNOSIS — E78.5 HYPERLIPIDEMIA ASSOCIATED WITH TYPE 2 DIABETES MELLITUS: Chronic | ICD-10-CM

## 2021-12-16 DIAGNOSIS — E11.69 HYPERLIPIDEMIA ASSOCIATED WITH TYPE 2 DIABETES MELLITUS: Chronic | ICD-10-CM

## 2021-12-16 LAB
ALBUMIN SERPL BCP-MCNC: 3.1 G/DL (ref 3.5–5.2)
ALP SERPL-CCNC: 144 U/L (ref 55–135)
ALT SERPL W/O P-5'-P-CCNC: 33 U/L (ref 10–44)
ANION GAP SERPL CALC-SCNC: 9 MMOL/L (ref 8–16)
AST SERPL-CCNC: 29 U/L (ref 10–40)
BILIRUB SERPL-MCNC: 0.4 MG/DL (ref 0.1–1)
BUN SERPL-MCNC: 34 MG/DL (ref 8–23)
CALCIUM SERPL-MCNC: 8.7 MG/DL (ref 8.7–10.5)
CHLORIDE SERPL-SCNC: 109 MMOL/L (ref 95–110)
CHOLEST SERPL-MCNC: 137 MG/DL (ref 120–199)
CHOLEST/HDLC SERPL: 2.5 {RATIO} (ref 2–5)
CO2 SERPL-SCNC: 24 MMOL/L (ref 23–29)
CREAT SERPL-MCNC: 2 MG/DL (ref 0.5–1.4)
EST. GFR  (AFRICAN AMERICAN): 38.8 ML/MIN/1.73 M^2
EST. GFR  (NON AFRICAN AMERICAN): 33.5 ML/MIN/1.73 M^2
ESTIMATED AVG GLUCOSE: 200 MG/DL (ref 68–131)
GLUCOSE SERPL-MCNC: 84 MG/DL (ref 70–110)
HBA1C MFR BLD: 8.6 % (ref 4–5.6)
HDLC SERPL-MCNC: 55 MG/DL (ref 40–75)
HDLC SERPL: 40.1 % (ref 20–50)
LDLC SERPL CALC-MCNC: 70.8 MG/DL (ref 63–159)
NONHDLC SERPL-MCNC: 82 MG/DL
POTASSIUM SERPL-SCNC: 4.5 MMOL/L (ref 3.5–5.1)
PROT SERPL-MCNC: 6.3 G/DL (ref 6–8.4)
SODIUM SERPL-SCNC: 142 MMOL/L (ref 136–145)
TRIGL SERPL-MCNC: 56 MG/DL (ref 30–150)

## 2021-12-16 PROCEDURE — 80061 LIPID PANEL: CPT | Performed by: NURSE PRACTITIONER

## 2021-12-16 PROCEDURE — 80053 COMPREHEN METABOLIC PANEL: CPT | Performed by: NURSE PRACTITIONER

## 2021-12-16 PROCEDURE — 83036 HEMOGLOBIN GLYCOSYLATED A1C: CPT | Performed by: NURSE PRACTITIONER

## 2021-12-16 PROCEDURE — 36415 COLL VENOUS BLD VENIPUNCTURE: CPT | Mod: PO | Performed by: NURSE PRACTITIONER

## 2021-12-21 ENCOUNTER — OFFICE VISIT (OUTPATIENT)
Dept: INTERNAL MEDICINE | Facility: CLINIC | Age: 66
End: 2021-12-21
Payer: MEDICARE

## 2021-12-21 VITALS
TEMPERATURE: 98 F | SYSTOLIC BLOOD PRESSURE: 138 MMHG | WEIGHT: 249.13 LBS | DIASTOLIC BLOOD PRESSURE: 80 MMHG | HEIGHT: 67 IN | BODY MASS INDEX: 39.1 KG/M2 | RESPIRATION RATE: 16 BRPM | HEART RATE: 91 BPM | OXYGEN SATURATION: 98 %

## 2021-12-21 DIAGNOSIS — N18.30 STAGE 3 CHRONIC KIDNEY DISEASE, UNSPECIFIED WHETHER STAGE 3A OR 3B CKD: Chronic | ICD-10-CM

## 2021-12-21 DIAGNOSIS — K76.6 PORTAL HYPERTENSION DUE TO OBSTRUCTION OF EXTRAHEPATIC PORTAL VEIN: Chronic | ICD-10-CM

## 2021-12-21 DIAGNOSIS — I48.0 PAROXYSMAL ATRIAL FIBRILLATION: ICD-10-CM

## 2021-12-21 DIAGNOSIS — I25.84 CORONARY ARTERY DISEASE DUE TO CALCIFIED CORONARY LESION: Chronic | ICD-10-CM

## 2021-12-21 DIAGNOSIS — E78.5 HYPERLIPIDEMIA ASSOCIATED WITH TYPE 2 DIABETES MELLITUS: Chronic | ICD-10-CM

## 2021-12-21 DIAGNOSIS — E11.22 TYPE 2 DIABETES MELLITUS WITH STAGE 3 CHRONIC KIDNEY DISEASE, WITH LONG-TERM CURRENT USE OF INSULIN, UNSPECIFIED WHETHER STAGE 3A OR 3B CKD: ICD-10-CM

## 2021-12-21 DIAGNOSIS — E13.9 LADA (LATENT AUTOIMMUNE DIABETES IN ADULTS), MANAGED AS TYPE 1: Primary | ICD-10-CM

## 2021-12-21 DIAGNOSIS — Z79.4 TYPE 2 DIABETES MELLITUS WITH STAGE 3 CHRONIC KIDNEY DISEASE, WITH LONG-TERM CURRENT USE OF INSULIN, UNSPECIFIED WHETHER STAGE 3A OR 3B CKD: ICD-10-CM

## 2021-12-21 DIAGNOSIS — N18.30 TYPE 2 DIABETES MELLITUS WITH STAGE 3 CHRONIC KIDNEY DISEASE, WITH LONG-TERM CURRENT USE OF INSULIN, UNSPECIFIED WHETHER STAGE 3A OR 3B CKD: ICD-10-CM

## 2021-12-21 DIAGNOSIS — I15.2 HYPERTENSION ASSOCIATED WITH DIABETES: Chronic | ICD-10-CM

## 2021-12-21 DIAGNOSIS — E11.69 TYPE 2 DIABETES MELLITUS WITH OTHER SPECIFIED COMPLICATION, WITH LONG-TERM CURRENT USE OF INSULIN: ICD-10-CM

## 2021-12-21 DIAGNOSIS — Z79.4 TYPE 2 DIABETES MELLITUS WITH OTHER SPECIFIED COMPLICATION, WITH LONG-TERM CURRENT USE OF INSULIN: ICD-10-CM

## 2021-12-21 DIAGNOSIS — I89.0 LYMPHEDEMA OF BOTH LOWER EXTREMITIES: Chronic | ICD-10-CM

## 2021-12-21 DIAGNOSIS — N52.9 ED (ERECTILE DYSFUNCTION) OF ORGANIC ORIGIN: ICD-10-CM

## 2021-12-21 DIAGNOSIS — E66.01 MORBID OBESITY: ICD-10-CM

## 2021-12-21 DIAGNOSIS — I50.42 CHRONIC COMBINED SYSTOLIC AND DIASTOLIC HEART FAILURE: ICD-10-CM

## 2021-12-21 DIAGNOSIS — I81 PORTAL HYPERTENSION DUE TO OBSTRUCTION OF EXTRAHEPATIC PORTAL VEIN: Chronic | ICD-10-CM

## 2021-12-21 DIAGNOSIS — E11.69 HYPERLIPIDEMIA ASSOCIATED WITH TYPE 2 DIABETES MELLITUS: Chronic | ICD-10-CM

## 2021-12-21 DIAGNOSIS — E11.59 HYPERTENSION ASSOCIATED WITH DIABETES: Chronic | ICD-10-CM

## 2021-12-21 DIAGNOSIS — I25.10 CORONARY ARTERY DISEASE DUE TO CALCIFIED CORONARY LESION: Chronic | ICD-10-CM

## 2021-12-21 PROCEDURE — 3066F PR DOCUMENTATION OF TREATMENT FOR NEPHROPATHY: ICD-10-PCS | Mod: CPTII,S$GLB,, | Performed by: NURSE PRACTITIONER

## 2021-12-21 PROCEDURE — 99214 OFFICE O/P EST MOD 30 MIN: CPT | Mod: S$GLB,,, | Performed by: NURSE PRACTITIONER

## 2021-12-21 PROCEDURE — 3066F NEPHROPATHY DOC TX: CPT | Mod: CPTII,S$GLB,, | Performed by: NURSE PRACTITIONER

## 2021-12-21 PROCEDURE — 3062F PR POS MACROALBUMINURIA RESULT DOCUMENTED/REVIEW: ICD-10-PCS | Mod: CPTII,S$GLB,, | Performed by: NURSE PRACTITIONER

## 2021-12-21 PROCEDURE — 99999 PR PBB SHADOW E&M-EST. PATIENT-LVL V: CPT | Mod: PBBFAC,,, | Performed by: NURSE PRACTITIONER

## 2021-12-21 PROCEDURE — 95251 CONT GLUC MNTR ANALYSIS I&R: CPT | Mod: S$GLB,,, | Performed by: NURSE PRACTITIONER

## 2021-12-21 PROCEDURE — 3062F POS MACROALBUMINURIA REV: CPT | Mod: CPTII,S$GLB,, | Performed by: NURSE PRACTITIONER

## 2021-12-21 PROCEDURE — 99214 PR OFFICE/OUTPT VISIT, EST, LEVL IV, 30-39 MIN: ICD-10-PCS | Mod: S$GLB,,, | Performed by: NURSE PRACTITIONER

## 2021-12-21 PROCEDURE — 99999 PR PBB SHADOW E&M-EST. PATIENT-LVL V: ICD-10-PCS | Mod: PBBFAC,,, | Performed by: NURSE PRACTITIONER

## 2021-12-21 PROCEDURE — 95251 PR GLUCOSE MONITOR, 72 HOUR, PHYS INTERP: ICD-10-PCS | Mod: S$GLB,,, | Performed by: NURSE PRACTITIONER

## 2021-12-21 RX ORDER — SILDENAFIL 100 MG/1
100 TABLET, FILM COATED ORAL DAILY PRN
Qty: 30 TABLET | Refills: 3 | Status: SHIPPED | OUTPATIENT
Start: 2021-12-21 | End: 2022-06-14

## 2021-12-21 RX ORDER — SUB-Q INSULIN DEVICE, 40 UNIT
1 EACH MISCELLANEOUS DAILY
Qty: 30 EACH | Refills: 6 | Status: SHIPPED | OUTPATIENT
Start: 2021-12-21 | End: 2022-01-20

## 2021-12-21 RX ORDER — INSULIN LISPRO 100 [IU]/ML
INJECTION, SOLUTION INTRAVENOUS; SUBCUTANEOUS
Qty: 90 ML | Refills: 1 | Status: SHIPPED | OUTPATIENT
Start: 2021-12-21 | End: 2022-03-22 | Stop reason: SDUPTHER

## 2022-01-02 ENCOUNTER — PATIENT OUTREACH (OUTPATIENT)
Dept: ADMINISTRATIVE | Facility: OTHER | Age: 67
End: 2022-01-02
Payer: MEDICARE

## 2022-01-03 ENCOUNTER — CLINICAL SUPPORT (OUTPATIENT)
Dept: DIABETES | Facility: CLINIC | Age: 67
End: 2022-01-03
Payer: MEDICARE

## 2022-01-03 DIAGNOSIS — E13.9 LADA (LATENT AUTOIMMUNE DIABETES IN ADULTS), MANAGED AS TYPE 1: ICD-10-CM

## 2022-01-03 PROCEDURE — G0108 DIAB MANAGE TRN  PER INDIV: HCPCS | Mod: S$GLB,,, | Performed by: DIETITIAN, REGISTERED

## 2022-01-03 PROCEDURE — G0108 PR DIAB MANAGE TRN  PER INDIV: ICD-10-PCS | Mod: S$GLB,,, | Performed by: DIETITIAN, REGISTERED

## 2022-01-03 NOTE — PROGRESS NOTES
Diabetes Care Specialist Progress Note  Author: Lori Rush RD, CDE  Date: 1/3/2022    Program Intake  Reason for Diabetes Program Visit:: Intervention  Type of Intervention:: Individual  Education: Pattern Management    Lab Results   Component Value Date    HGBA1C 8.6 (H) 12/16/2021     Diabetes Self-Management Skills Assessment    Patient was referred for follow up for VGO 20 to 30 transition; however, patient has not yet started the VGO 30. It was out of stock at the pharmacy so plans to  this week to start. Reviewed use of the VGO and the differences between the VGO 20 and 30. Advised to continue with 4 clicks AC. He does adjust his clicks based on his meal so will plan to do 3 clicks for small meal and 5 clicks for large meal. Advised to do 1 click for snack and to add an extra click for BG over 180 before the meal. Patient encouraged to bolus before meals. He ate cereal this AM and did not bolus because BG was in the low 100s - BG then went over 300 in clinic and gave 3 clicks at that time - strongly encouraged to give clicks before meal, not after.He has a history of skipping clicks or giving them after he sees what the BG does. Discussed possible use of the Omnipod for his insulin delivery, but patient is not interested - feels it would be too complex.    Assessment Summary and Plan    Based on today's diabetes care assessment, the following areas of need were identified:      Social 4/19/2021   Support No   Access to Mass Media/Tech No   Cognitive/Behavioral Health No   Culture/Religious No   Communication No   Health Literacy No        Clinical 8/23/2021   Medication Adherence Yes   Lab Compliance No   Nutritional Status No                Today's interventions were provided through individual discussion, instruction, and written materials were provided.      Patient verbalized understanding of instruction and written materials.  Pt was able to return back demonstration of instructions today. Patient  understood key points, needs reinforcement and further instruction.     Diabetes Self-Management Care Plan:    Today's Diabetes Self-Management Care Plan was developed with Jian's input. Jian has agreed to work toward the following goal(s) to improve his/her overall diabetes control.      Care Plan: Diabetes Management   Updates made since 12/4/2021 12:00 AM      Problem: Medications       Goal: Patient Agrees to use VGo as instructed: will give clicks 15 minutes prior to eating; will change VGo at same time every day    Start Date: 4/19/2021   Expected End Date: 4/19/2022   This Visit's Progress: On track   Recent Progress: On track   Priority: High   Barriers: Lack of Motivation to Change   Note:    VGo troubleshooting         Follow Up Plan     Follow up in about 6 weeks (around 2/14/2022).    Today's care plan and follow up schedule was discussed with patient.  Jian verbalized understanding of the care plan, goals, and agrees to follow up plan.        The patient was encouraged to communicate with his/her health care provider/physician and care team regarding his/her condition(s) and treatment.  I provided the patient with my contact information today and encouraged to contact me via phone or [facility] Patient Portal as needed.     Length of Visit   Total Time: 60 Minutes

## 2022-01-11 ENCOUNTER — PES CALL (OUTPATIENT)
Dept: ADMINISTRATIVE | Facility: CLINIC | Age: 67
End: 2022-01-11
Payer: MEDICARE

## 2022-01-13 ENCOUNTER — OFFICE VISIT (OUTPATIENT)
Dept: INTERNAL MEDICINE | Facility: CLINIC | Age: 67
End: 2022-01-13
Payer: MEDICARE

## 2022-01-13 VITALS
BODY MASS INDEX: 41.21 KG/M2 | WEIGHT: 262.56 LBS | SYSTOLIC BLOOD PRESSURE: 130 MMHG | HEART RATE: 86 BPM | OXYGEN SATURATION: 98 % | HEIGHT: 67 IN | DIASTOLIC BLOOD PRESSURE: 78 MMHG | RESPIRATION RATE: 14 BRPM

## 2022-01-13 DIAGNOSIS — E13.9 LADA (LATENT AUTOIMMUNE DIABETES IN ADULTS), MANAGED AS TYPE 1: ICD-10-CM

## 2022-01-13 DIAGNOSIS — I25.84 CORONARY ARTERY DISEASE DUE TO CALCIFIED CORONARY LESION: Chronic | ICD-10-CM

## 2022-01-13 DIAGNOSIS — E55.9 VITAMIN D DEFICIENCY: Chronic | ICD-10-CM

## 2022-01-13 DIAGNOSIS — Z79.4 TYPE 2 DIABETES MELLITUS WITH STAGE 3 CHRONIC KIDNEY DISEASE, WITH LONG-TERM CURRENT USE OF INSULIN, UNSPECIFIED WHETHER STAGE 3A OR 3B CKD: ICD-10-CM

## 2022-01-13 DIAGNOSIS — E66.01 MORBID OBESITY: ICD-10-CM

## 2022-01-13 DIAGNOSIS — K76.6 PORTAL HYPERTENSION DUE TO OBSTRUCTION OF EXTRAHEPATIC PORTAL VEIN: Chronic | ICD-10-CM

## 2022-01-13 DIAGNOSIS — E66.2 OBESITY HYPOVENTILATION SYNDROME: Chronic | ICD-10-CM

## 2022-01-13 DIAGNOSIS — G47.30 SLEEP APNEA, UNSPECIFIED TYPE: ICD-10-CM

## 2022-01-13 DIAGNOSIS — I25.10 CORONARY ARTERY DISEASE DUE TO CALCIFIED CORONARY LESION: Chronic | ICD-10-CM

## 2022-01-13 DIAGNOSIS — I89.0 LYMPHEDEMA OF BOTH LOWER EXTREMITIES: Chronic | ICD-10-CM

## 2022-01-13 DIAGNOSIS — N20.1 OBSTRUCTION OF RIGHT URETEROPELVIC JUNCTION (UPJ) DUE TO STONE: ICD-10-CM

## 2022-01-13 DIAGNOSIS — D69.2 OTHER NONTHROMBOCYTOPENIC PURPURA: ICD-10-CM

## 2022-01-13 DIAGNOSIS — D64.9 ANEMIA, UNSPECIFIED TYPE: ICD-10-CM

## 2022-01-13 DIAGNOSIS — E88.09 HYPOALBUMINEMIA: ICD-10-CM

## 2022-01-13 DIAGNOSIS — N18.30 TYPE 2 DIABETES MELLITUS WITH STAGE 3 CHRONIC KIDNEY DISEASE, WITH LONG-TERM CURRENT USE OF INSULIN, UNSPECIFIED WHETHER STAGE 3A OR 3B CKD: ICD-10-CM

## 2022-01-13 DIAGNOSIS — I70.0 AORTIC ATHEROSCLEROSIS: Primary | ICD-10-CM

## 2022-01-13 DIAGNOSIS — E11.59 HYPERTENSION ASSOCIATED WITH DIABETES: Chronic | ICD-10-CM

## 2022-01-13 DIAGNOSIS — I81 PORTAL HYPERTENSION DUE TO OBSTRUCTION OF EXTRAHEPATIC PORTAL VEIN: Chronic | ICD-10-CM

## 2022-01-13 DIAGNOSIS — G89.29 CHRONIC RIGHT SHOULDER PAIN: ICD-10-CM

## 2022-01-13 DIAGNOSIS — E11.22 TYPE 2 DIABETES MELLITUS WITH STAGE 3 CHRONIC KIDNEY DISEASE, WITH LONG-TERM CURRENT USE OF INSULIN, UNSPECIFIED WHETHER STAGE 3A OR 3B CKD: ICD-10-CM

## 2022-01-13 DIAGNOSIS — B35.1 ONYCHOMYCOSIS OF MULTIPLE TOENAILS WITH TYPE 2 DIABETES MELLITUS AND PERIPHERAL NEUROPATHY: ICD-10-CM

## 2022-01-13 DIAGNOSIS — M25.511 CHRONIC RIGHT SHOULDER PAIN: ICD-10-CM

## 2022-01-13 DIAGNOSIS — E11.42 ONYCHOMYCOSIS OF MULTIPLE TOENAILS WITH TYPE 2 DIABETES MELLITUS AND PERIPHERAL NEUROPATHY: ICD-10-CM

## 2022-01-13 DIAGNOSIS — E11.40 TYPE 2 DIABETES MELLITUS WITH DIABETIC NEUROPATHY, WITH LONG-TERM CURRENT USE OF INSULIN: Chronic | ICD-10-CM

## 2022-01-13 DIAGNOSIS — N20.0 KIDNEY STONE ON RIGHT SIDE: ICD-10-CM

## 2022-01-13 DIAGNOSIS — N43.3 BILATERAL HYDROCELE: ICD-10-CM

## 2022-01-13 DIAGNOSIS — Z86.010 HISTORY OF COLON POLYPS: ICD-10-CM

## 2022-01-13 DIAGNOSIS — K86.3 PSEUDOCYST OF PANCREAS: ICD-10-CM

## 2022-01-13 DIAGNOSIS — Z79.01 CURRENT USE OF LONG TERM ANTICOAGULATION: ICD-10-CM

## 2022-01-13 DIAGNOSIS — N20.1 URETERAL STONE: ICD-10-CM

## 2022-01-13 DIAGNOSIS — Z91.199 MEDICALLY NONCOMPLIANT: ICD-10-CM

## 2022-01-13 DIAGNOSIS — Z79.4 TYPE 2 DIABETES MELLITUS WITH DIABETIC NEUROPATHY, WITH LONG-TERM CURRENT USE OF INSULIN: Chronic | ICD-10-CM

## 2022-01-13 DIAGNOSIS — Z00.00 ENCOUNTER FOR PREVENTIVE HEALTH EXAMINATION: ICD-10-CM

## 2022-01-13 DIAGNOSIS — Z98.84 STATUS POST BARIATRIC SURGERY: ICD-10-CM

## 2022-01-13 DIAGNOSIS — E11.69 HYPERLIPIDEMIA ASSOCIATED WITH TYPE 2 DIABETES MELLITUS: Chronic | ICD-10-CM

## 2022-01-13 DIAGNOSIS — K74.69 OTHER CIRRHOSIS OF LIVER: ICD-10-CM

## 2022-01-13 DIAGNOSIS — E27.8 OTHER SPECIFIED DISORDERS OF ADRENAL GLAND: ICD-10-CM

## 2022-01-13 DIAGNOSIS — E78.5 HYPERLIPIDEMIA ASSOCIATED WITH TYPE 2 DIABETES MELLITUS: Chronic | ICD-10-CM

## 2022-01-13 DIAGNOSIS — K86.1 OTHER CHRONIC PANCREATITIS: ICD-10-CM

## 2022-01-13 DIAGNOSIS — I15.2 HYPERTENSION ASSOCIATED WITH DIABETES: Chronic | ICD-10-CM

## 2022-01-13 DIAGNOSIS — I48.0 PAROXYSMAL ATRIAL FIBRILLATION: ICD-10-CM

## 2022-01-13 DIAGNOSIS — E11.69 ONYCHOMYCOSIS OF MULTIPLE TOENAILS WITH TYPE 2 DIABETES MELLITUS AND PERIPHERAL NEUROPATHY: ICD-10-CM

## 2022-01-13 DIAGNOSIS — I50.42 CHRONIC COMBINED SYSTOLIC AND DIASTOLIC HEART FAILURE: ICD-10-CM

## 2022-01-13 DIAGNOSIS — I70.0 ATHEROSCLEROSIS OF AORTA: ICD-10-CM

## 2022-01-13 DIAGNOSIS — N18.30 STAGE 3 CHRONIC KIDNEY DISEASE, UNSPECIFIED WHETHER STAGE 3A OR 3B CKD: Chronic | ICD-10-CM

## 2022-01-13 DIAGNOSIS — M14.60 ARTHROPATHY ASSOCIATED WITH NEUROLOGICAL DISORDER: ICD-10-CM

## 2022-01-13 DIAGNOSIS — K76.0 FATTY LIVER DISEASE, NONALCOHOLIC: Chronic | ICD-10-CM

## 2022-01-13 DIAGNOSIS — K74.00 HEPATIC FIBROSIS: ICD-10-CM

## 2022-01-13 DIAGNOSIS — E11.42 DIABETIC POLYNEUROPATHY ASSOCIATED WITH TYPE 2 DIABETES MELLITUS: ICD-10-CM

## 2022-01-13 PROBLEM — Z86.0100 HISTORY OF COLON POLYPS: Status: ACTIVE | Noted: 2022-01-13

## 2022-01-13 PROCEDURE — 1126F PR PAIN SEVERITY QUANTIFIED, NO PAIN PRESENT: ICD-10-PCS | Mod: CPTII,S$GLB,, | Performed by: NURSE PRACTITIONER

## 2022-01-13 PROCEDURE — 3052F PR MOST RECENT HEMOGLOBIN A1C LEVEL 8.0 - < 9.0%: ICD-10-PCS | Mod: CPTII,S$GLB,, | Performed by: NURSE PRACTITIONER

## 2022-01-13 PROCEDURE — 3008F PR BODY MASS INDEX (BMI) DOCUMENTED: ICD-10-PCS | Mod: CPTII,S$GLB,, | Performed by: NURSE PRACTITIONER

## 2022-01-13 PROCEDURE — 1159F PR MEDICATION LIST DOCUMENTED IN MEDICAL RECORD: ICD-10-PCS | Mod: CPTII,S$GLB,, | Performed by: NURSE PRACTITIONER

## 2022-01-13 PROCEDURE — 1170F FXNL STATUS ASSESSED: CPT | Mod: CPTII,S$GLB,, | Performed by: NURSE PRACTITIONER

## 2022-01-13 PROCEDURE — 3075F SYST BP GE 130 - 139MM HG: CPT | Mod: CPTII,S$GLB,, | Performed by: NURSE PRACTITIONER

## 2022-01-13 PROCEDURE — G0439 PPPS, SUBSEQ VISIT: HCPCS | Mod: S$GLB,,, | Performed by: NURSE PRACTITIONER

## 2022-01-13 PROCEDURE — 1101F PT FALLS ASSESS-DOCD LE1/YR: CPT | Mod: CPTII,S$GLB,, | Performed by: NURSE PRACTITIONER

## 2022-01-13 PROCEDURE — 3078F DIAST BP <80 MM HG: CPT | Mod: CPTII,S$GLB,, | Performed by: NURSE PRACTITIONER

## 2022-01-13 PROCEDURE — 99499 RISK ADDL DX/OHS AUDIT: ICD-10-PCS | Mod: S$GLB,,, | Performed by: NURSE PRACTITIONER

## 2022-01-13 PROCEDURE — 3052F HG A1C>EQUAL 8.0%<EQUAL 9.0%: CPT | Mod: CPTII,S$GLB,, | Performed by: NURSE PRACTITIONER

## 2022-01-13 PROCEDURE — 3008F BODY MASS INDEX DOCD: CPT | Mod: CPTII,S$GLB,, | Performed by: NURSE PRACTITIONER

## 2022-01-13 PROCEDURE — 99999 PR PBB SHADOW E&M-EST. PATIENT-LVL V: CPT | Mod: PBBFAC,,, | Performed by: NURSE PRACTITIONER

## 2022-01-13 PROCEDURE — 1126F AMNT PAIN NOTED NONE PRSNT: CPT | Mod: CPTII,S$GLB,, | Performed by: NURSE PRACTITIONER

## 2022-01-13 PROCEDURE — 1159F MED LIST DOCD IN RCRD: CPT | Mod: CPTII,S$GLB,, | Performed by: NURSE PRACTITIONER

## 2022-01-13 PROCEDURE — 3288F PR FALLS RISK ASSESSMENT DOCUMENTED: ICD-10-PCS | Mod: CPTII,S$GLB,, | Performed by: NURSE PRACTITIONER

## 2022-01-13 PROCEDURE — 3075F PR MOST RECENT SYSTOLIC BLOOD PRESS GE 130-139MM HG: ICD-10-PCS | Mod: CPTII,S$GLB,, | Performed by: NURSE PRACTITIONER

## 2022-01-13 PROCEDURE — 1101F PR PT FALLS ASSESS DOC 0-1 FALLS W/OUT INJ PAST YR: ICD-10-PCS | Mod: CPTII,S$GLB,, | Performed by: NURSE PRACTITIONER

## 2022-01-13 PROCEDURE — 1160F RVW MEDS BY RX/DR IN RCRD: CPT | Mod: CPTII,S$GLB,, | Performed by: NURSE PRACTITIONER

## 2022-01-13 PROCEDURE — 99499 UNLISTED E&M SERVICE: CPT | Mod: S$GLB,,, | Performed by: NURSE PRACTITIONER

## 2022-01-13 PROCEDURE — G0439 PR MEDICARE ANNUAL WELLNESS SUBSEQUENT VISIT: ICD-10-PCS | Mod: S$GLB,,, | Performed by: NURSE PRACTITIONER

## 2022-01-13 PROCEDURE — 99999 PR PBB SHADOW E&M-EST. PATIENT-LVL V: ICD-10-PCS | Mod: PBBFAC,,, | Performed by: NURSE PRACTITIONER

## 2022-01-13 PROCEDURE — 1170F PR FUNCTIONAL STATUS ASSESSED: ICD-10-PCS | Mod: CPTII,S$GLB,, | Performed by: NURSE PRACTITIONER

## 2022-01-13 PROCEDURE — 3078F PR MOST RECENT DIASTOLIC BLOOD PRESSURE < 80 MM HG: ICD-10-PCS | Mod: CPTII,S$GLB,, | Performed by: NURSE PRACTITIONER

## 2022-01-13 PROCEDURE — 3288F FALL RISK ASSESSMENT DOCD: CPT | Mod: CPTII,S$GLB,, | Performed by: NURSE PRACTITIONER

## 2022-01-13 PROCEDURE — 1160F PR REVIEW ALL MEDS BY PRESCRIBER/CLIN PHARMACIST DOCUMENTED: ICD-10-PCS | Mod: CPTII,S$GLB,, | Performed by: NURSE PRACTITIONER

## 2022-01-13 NOTE — PROGRESS NOTES
"Jian Arrieta presented for a  Medicare AWV and comprehensive Health Risk Assessment today. The following components were reviewed and updated:    · Medical history  · Family History  · Social history  · Allergies and Current Medications  · Health Risk Assessment  · Health Maintenance  · Care Team     ** See Completed Assessments for Annual Wellness Visit within the encounter summary.**       The following assessments were completed:  · Living Situation  · CAGE  · Depression Screening  · Timed Get Up and Go  · Whisper Test- SN[facility]- not wearing aides  · Cognitive Function Screening- declined 3 word recall - could not complete cognitive function screening-  · Nutrition Screening  · ADL Screening  · PAQ Screening    Vitals:    01/13/22 0854   BP: 130/78   BP Location: Left arm   Pulse: 86   Resp: 14   SpO2: 98%   Weight: 119.1 kg (262 lb 9.1 oz)   Height: 5' 6.5" (1.689 m)     Body mass index is 41.74 kg/m².  Physical Exam  Constitutional:       Appearance: He is well-developed.   HENT:      Head: Normocephalic.      Comments: Wears face mask     Right Ear: External ear normal.      Left Ear: External ear normal.   Cardiovascular:      Rate and Rhythm: Normal rate and regular rhythm.   Pulmonary:      Effort: Pulmonary effort is normal.      Breath sounds: Normal breath sounds.   Abdominal:      Palpations: Abdomen is soft.      Comments: obese   Musculoskeletal:         General: Swelling present.   Skin:     General: Skin is warm and dry.      Findings: No rash.   Neurological:      Mental Status: He is alert and oriented to person, place, and time.      Motor: No abnormal muscle tone.      Deep Tendon Reflexes: Reflexes are normal and symmetric.   Psychiatric:         Mood and Affect: Mood normal.         Behavior: Behavior normal.         Thought Content: Thought content normal.         Judgment: Judgment normal.           Lab Results   Component Value Date    HGBA1C 8.6 (H) 12/16/2021    CHOL 137 12/16/2021    HDL 55 " 12/16/2021    LDLCALC 70.8 12/16/2021    TRIG 56 12/16/2021    CHOLHDL 40.1 12/16/2021      Diagnoses and health risks identified today and associated recommendations/orders:    1. Aortic atherosclerosis  Stable- followed by PCP    2. Arthropathy associated with neurological disorder  Stable- followed by PCP    3. Atherosclerosis of aorta  Stable- followed by PCP    4. Stage 3 chronic kidney disease, unspecified whether stage 3a or 3b CKD  Stable- followed by PCP    5. Chronic combined systolic and diastolic heart failure  Stable- followed by PCP    6. Vitamin D deficiency  Stable- followed by PCP    7. Status post bariatric surgery  Stable- followed by PCP    8. Ureteral stone  Stable- followed by PCP    9. Type 2 diabetes mellitus with stage 3 chronic kidney disease, with long-term current use of insulin, unspecified whether stage 3a or 3b CKD  Stable- followed by PCP, Maxine MORALES    10. Sleep apnea, unspecified type  Stable- followed by PCP    11. Portal hypertension due to obstruction of extrahepatic portal vein  Stable- followed by PCP    12. Paroxysmal atrial fibrillation  Stable- followed by PCP    13. Other cirrhosis of liver  Stable- followed by PCP    14. Obstruction of right ureteropelvic junction (UPJ) due to stone  Stable- followed by PCP, urology    15. Obesity hypoventilation syndrome  Stable- followed by PCP    16. Hypertension associated with diabetes  Stable- followed by PCP    17. Hyperlipidemia associated with type 2 diabetes mellitus  Stable- followed by PCP    18. 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 Diabetic polyneuropathy associated with type 2 diabetes mellitus  Stable- followed by PCP, Maxine MORALES    20. Bilateral hydrocele  Stable- followed by PCP, urology    21. Coronary artery disease due to calcified coronary lesion  Stable- followed by PCP, cardiology    22. Current use of long term  anticoagulation  Stable- followed by PCP, cardiology    23. Diabetes mellitus due to underlying condition, uncontrolled, with renal complication  Stable- followed by PCP, Maxine MORALES    24. Hepatic fibrosis  Stable- followed by PCP    25. Fatty liver disease, nonalcoholic  Stable- followed by PCP    26. Pseudocyst of pancreas  Stable- followed by PCP    27. Type 2 diabetes mellitus with diabetic neuropathy, with long-term current use of insulin  Stable- followed by PCP    28. Anemia, unspecified type  Stable- followed by PCP    29. History of colon polyps  Stable- followed by PCP    30. Chronic right shoulder pain  Stable- followed by PCP  - Ambulatory referral/consult to Orthopedics; Future    31. Other specified disorders of adrenal gland  Stable- followed by PCP    32. Other chronic pancreatitis  Stable- followed by PCP    33. Other nonthrombocytopenic purpura  Stable- followed by PCP    34. Encounter for preventive health examination  Here for Health Risk Assessment/Annual Wellness Visit.  Health maintenance reviewed and updated. Follow up in one year.     35. Kidney stone on right side  Stable- followed by PCP, urology    36. Hypoalbuminemia  Stable- followed by PCP    37. Lymphedema of both lower extremities  Stable- followed by PCP    38. Medically noncompliant  Stable- followed by PCP    39. Morbid obesity  Chronic . Followed by PCP.   Centers for Disease Control and Prevention (CDC)  weight recommendations for current BMI & ideal BMI range discussed with patient.  Recommended  gradual weight loss,   diabetic diet , no added salt, start structured regular exercise qday.      40. Onychomycosis of multiple toenails with type 2 diabetes mellitus and peripheral neuropathy  Stable- followed by PCP      Provided Jian with a 5-10 year written screening schedule and personal prevention plan. Recommendations were developed using the USPSTF age appropriate recommendations. Education, counseling, and referrals  were provided as needed. After Visit Summary printed and given to patient which includes a list of additional screenings\tests needed. Has dexcom- FBS today 159. Declined 3 word recall- unable to complete total cognitive function screen for scoring. Pt  Verbalizes subjective memory changes- has to write lists or forgets. Handouts given [employer] Brain health Self Assessment questionnaire given for patient to complete at home. Brain fitness cross training guide given & handout on how to help your memory Annual PCP visit scheduled. On eliquis- denies bleeding. States he does not take some medications. Referred back to providers to discuss. Requesting evaluation of chronic pain- referral given for orthopedics. He has appts in near future with dietician, Maxine MORALES for DM. Due to eye exam- external eye- Dr Mack. Flu vaccine due- he will check with external pharmacy if he has received. Encouraged, weight loss, DM diet handout given.     Follow up in about 1 year (around 1/13/2023) for HRA.    NESSA Chaparro offered to discuss advanced care planning, including how to pick a person who would make decisions for you if you were unable to make them for yourself, called a health care power of , and what kind of decisions you might make such as use of life sustaining treatments such as ventilators and tube feeding when faced with a life limiting illness recorded on a living will that they will need to know. (How you want to be cared for as you near the end of your natural life)     X Patient is interested in learning more about how to make advanced directives.  I provided them paperwork and offered to discuss this with them.

## 2022-01-13 NOTE — PATIENT INSTRUCTIONS
Counseling and Referral of Other Preventative  (Italic type indicates deductible and co-insurance are waived)    Patient Name: Jian Arrieta  Today's Date: 1/13/2022    Health Maintenance       Date Due Completion Date    Influenza Vaccine (1) Due   9/30/2020    Shingles Vaccine (1 of 2) 03/13/2023   ---    Diabetes Urine Screening 03/26/2022   3/26/2021    Eye Exam Due   6/10/2021    Hemoglobin A1c scheduled   12/16/2021    Foot Exam 09/21/2022 9/21/2021    Lipid Panel Scheduled   12/16/2021    Colorectal Cancer Screening 05/13/2026 5/13/2021    TETANUS VACCINE    Shoulder pain- referral given to orthopedic    Gradual weight loss over next year    Under care of Maxine MORALES for diabetes    Has appointment for diabetes education with dietician in february       03/13/2027   3/13/2017        Orders Placed This Encounter   Procedures    Ambulatory referral/consult to Orthopedics     The following information is provided to all patients.  This information is to help you find resources for any of the problems found today that may be affecting your health:                Living healthy guide: www.[facility].[place].gov      Understanding Diabetes: www.diabetes.org      Eating healthy: www.cdc.gov/healthyweight      CDC home safety checklist: www.cdc.gov/steadi/patient.html      Agency on Aging: www.goea.[place].gov      Alcoholics anonymous (AA): www.aa.org      Physical Activity: www.moraima.nih.gov/vu9ybqh      Tobacco use: www.quitwithusla.org

## 2022-01-24 ENCOUNTER — TELEPHONE (OUTPATIENT)
Dept: PRIMARY CARE CLINIC | Facility: CLINIC | Age: 67
End: 2022-01-24
Payer: MEDICARE

## 2022-01-24 NOTE — TELEPHONE ENCOUNTER
Pt states he has been on the VGO 30  And experiencing lows, he feels really bad when it goes this low, he doesn't think you needs to go higher on more insulin, pt would like a call back.

## 2022-01-24 NOTE — TELEPHONE ENCOUNTER
Can you call patient and see if he has started on his vgo 30 yet?   I had increased him from vgo 20 to vgo 30 as his sugars were still running very high.   I looked at his dexcom last week and the sugars are still very high.   If he is on the 30 now, and still high - I may increase him/change him to the vgo 40 -     Please let me know his feedback?     Thanks,  stephanie

## 2022-01-25 ENCOUNTER — TELEPHONE (OUTPATIENT)
Dept: INTERNAL MEDICINE | Facility: CLINIC | Age: 67
End: 2022-01-25
Payer: MEDICARE

## 2022-01-25 NOTE — TELEPHONE ENCOUNTER
This patient called in today complaining of some low blood sugars.   I downloaded his dexcom and reviewed it -   38% tir,   1 - 2% lows.     He's a ELOISE -   Now on a vgo 30 instead of a 20 -     I think he might benefit more from an insulin pump.     I see he is scheduled to see you on 2/14 -   Any chance you can call him and move this visit up with you?     I'd like for you to review his clicks/how he's using his vgo currently, discuss pump therapy with him and see/assess what he needs.     The vgo is good - just thinking he may get steadier bg's on pump -     Thanks,  stephanie

## 2022-01-25 NOTE — TELEPHONE ENCOUNTER
Tried calling patient back, and wanted to discuss dexcom download.   Left VM to call back.   Likely needs to see DE earlier, pump eval.   He is now on a vgo 30 - but having lows.   However still too many highs.   The vgo 20 wasn't strong enough.     eval for omnipod.     Will await patient's call back.    Sent messge to educator to meet with patient hopefully within week to address/make changes.     Thanks,  stephanie

## 2022-01-26 ENCOUNTER — CLINICAL SUPPORT (OUTPATIENT)
Dept: INTERNAL MEDICINE | Facility: CLINIC | Age: 67
End: 2022-01-26
Payer: MEDICARE

## 2022-01-26 DIAGNOSIS — E11.22 TYPE 2 DIABETES MELLITUS WITH STAGE 3 CHRONIC KIDNEY DISEASE, WITH LONG-TERM CURRENT USE OF INSULIN, UNSPECIFIED WHETHER STAGE 3A OR 3B CKD: Primary | ICD-10-CM

## 2022-01-26 DIAGNOSIS — N18.30 TYPE 2 DIABETES MELLITUS WITH STAGE 3 CHRONIC KIDNEY DISEASE, WITH LONG-TERM CURRENT USE OF INSULIN, UNSPECIFIED WHETHER STAGE 3A OR 3B CKD: Primary | ICD-10-CM

## 2022-01-26 DIAGNOSIS — Z79.4 TYPE 2 DIABETES MELLITUS WITH STAGE 3 CHRONIC KIDNEY DISEASE, WITH LONG-TERM CURRENT USE OF INSULIN, UNSPECIFIED WHETHER STAGE 3A OR 3B CKD: Primary | ICD-10-CM

## 2022-01-26 PROCEDURE — 95251 PR GLUCOSE MONITOR, 72 HOUR, PHYS INTERP: ICD-10-PCS | Mod: S$GLB,,, | Performed by: NURSE PRACTITIONER

## 2022-01-26 PROCEDURE — 95251 CONT GLUC MNTR ANALYSIS I&R: CPT | Mod: S$GLB,,, | Performed by: NURSE PRACTITIONER

## 2022-01-26 NOTE — PROGRESS NOTES
Called patient. Unable to get in touch with - left VM.   Contacted DE - he has done pump assessment initially and did not want to try a pump.   He was placed on VGO 20 - not enough insulin so increased to vgo 30 -   dexcom reviewed on VGO 30 -   Average glucose is 211 -   38% time in range.   <1% lows.   <1% extreme lows.   30% highs.   31% extreme highs.     Think he would be best served on insulin pump, as he is a Type 1 - ELOISE -   as he complains of high sugars during the day, yet has borderline lows.   Usually following boluses. Difficult to control and needs   Customized settings likely needed -   As more stable range bg's overnight,   Yet very high during the day time hours.     Patient coming in to follow up with DE in 1 - 2 weeks for re-discussion of pump.   Mariana, NP

## 2022-02-14 ENCOUNTER — CLINICAL SUPPORT (OUTPATIENT)
Dept: DIABETES | Facility: CLINIC | Age: 67
End: 2022-02-14
Payer: MEDICARE

## 2022-02-14 ENCOUNTER — PATIENT OUTREACH (OUTPATIENT)
Dept: ADMINISTRATIVE | Facility: OTHER | Age: 67
End: 2022-02-14
Payer: MEDICARE

## 2022-02-14 DIAGNOSIS — N18.30 TYPE 2 DIABETES MELLITUS WITH STAGE 3 CHRONIC KIDNEY DISEASE, WITH LONG-TERM CURRENT USE OF INSULIN, UNSPECIFIED WHETHER STAGE 3A OR 3B CKD: ICD-10-CM

## 2022-02-14 DIAGNOSIS — Z79.4 TYPE 2 DIABETES MELLITUS WITH STAGE 3 CHRONIC KIDNEY DISEASE, WITH LONG-TERM CURRENT USE OF INSULIN, UNSPECIFIED WHETHER STAGE 3A OR 3B CKD: ICD-10-CM

## 2022-02-14 DIAGNOSIS — E11.22 TYPE 2 DIABETES MELLITUS WITH STAGE 3 CHRONIC KIDNEY DISEASE, WITH LONG-TERM CURRENT USE OF INSULIN, UNSPECIFIED WHETHER STAGE 3A OR 3B CKD: ICD-10-CM

## 2022-02-14 PROCEDURE — G0108 PR DIAB MANAGE TRN  PER INDIV: ICD-10-PCS | Mod: S$GLB,,, | Performed by: DIETITIAN, REGISTERED

## 2022-02-14 PROCEDURE — G0108 DIAB MANAGE TRN  PER INDIV: HCPCS | Mod: S$GLB,,, | Performed by: DIETITIAN, REGISTERED

## 2022-02-14 NOTE — PROGRESS NOTES
Diabetes Care Specialist Progress Note  Author: Lori Rush RD, CDE  Date: 2/14/2022    Program Intake  Reason for Diabetes Program Visit:: Intervention  Type of Intervention:: Individual  Education: Pattern Management    Lab Results   Component Value Date    HGBA1C 8.6 (H) 12/16/2021       Diabetes Self-Management Skills Assessment    Patient was referred for follow up for VGO 20 to 30 transition. Dexcom report reviewed with patient - TIR 34%, High 34%, Very High 31%, Low <1%. TIR slightly improved from 25%. He states he typically gives 2-3 clicks AC. Advised he was prescribed 4 clicks AC. At his last visit, he reported adjusting his clicks based on his meal so we talked about doing 3 clicks for small meal and 5 clicks for large meal, but did not implement. He was also advised to do 1 click for snack and to add an extra click for BG over 180 before the meal, but does not do that either. Patient encouraged to bolus before meals. He ate oatmeal and fruit this AM and did not bolus because BG was in the low 100s. His low alert is set at 100 because he starts to feel bad in the low 100s. He has a history of skipping clicks or giving them after he sees what the BG does. Discussed possible use of the Omnipod for his insulin delivery, but patient is not interested - feels it would be too complex. Advised to monitor his Dexcom before and 2 hours after meals to determine if he needs to adjust clicks based on the meal depending on how the BG responds. Advised to add a click for BG over 180 before the meal and 1 click for snack. IF BG is still over 180 4 hours after eating/giving clicks, he can then do a correction of 1 click.    Assessment Summary and Plan    Based on today's diabetes care assessment, the following areas of need were identified:      Social 4/19/2021   Support No   Access to Mass Media/Tech No   Cognitive/Behavioral Health No   Culture/Jewish No   Communication No   Health Literacy No        Clinical  8/23/2021   Medication Adherence Yes   Lab Compliance No   Nutritional Status No                Today's interventions were provided through individual discussion, instruction, and written materials were provided.      Patient verbalized understanding of instruction and written materials.  Pt was able to return back demonstration of instructions today. Patient understood key points, needs reinforcement and further instruction.     Diabetes Self-Management Care Plan:    Today's Diabetes Self-Management Care Plan was developed with Jian's input. Jian has agreed to work toward the following goal(s) to improve his/her overall diabetes control.      Care Plan: Diabetes Management   Updates made since 1/15/2022 12:00 AM      Problem: Medications       Goal: Patient Agrees to use VGo as instructed: will give clicks 15 minutes prior to eating; will change VGo at same time every day    Start Date: 4/19/2021   Expected End Date: 4/19/2022   This Visit's Progress: On track   Recent Progress: On track   Priority: High   Barriers: Lack of Motivation to Change   Note:    VGo troubleshooting         Follow Up Plan     Follow up in about 6 months (around 8/14/2022).    Today's care plan and follow up schedule was discussed with patient.  Jian verbalized understanding of the care plan, goals, and agrees to follow up plan.        The patient was encouraged to communicate with his/her health care provider/physician and care team regarding his/her condition(s) and treatment.  I provided the patient with my contact information today and encouraged to contact me via phone or [facility]'s Patient Portal as needed.     Length of Visit   Total Time: 60 Minutes

## 2022-03-15 ENCOUNTER — LAB VISIT (OUTPATIENT)
Dept: LAB | Facility: HOSPITAL | Age: 67
End: 2022-03-15
Attending: INTERNAL MEDICINE
Payer: MEDICARE

## 2022-03-15 DIAGNOSIS — E13.9 LADA (LATENT AUTOIMMUNE DIABETES IN ADULTS), MANAGED AS TYPE 1: ICD-10-CM

## 2022-03-15 LAB
ALBUMIN SERPL BCP-MCNC: 3 G/DL (ref 3.5–5.2)
ALP SERPL-CCNC: 164 U/L (ref 55–135)
ALT SERPL W/O P-5'-P-CCNC: 37 U/L (ref 10–44)
ANION GAP SERPL CALC-SCNC: 9 MMOL/L (ref 8–16)
AST SERPL-CCNC: 36 U/L (ref 10–40)
BILIRUB SERPL-MCNC: 0.3 MG/DL (ref 0.1–1)
BUN SERPL-MCNC: 30 MG/DL (ref 8–23)
CALCIUM SERPL-MCNC: 8.7 MG/DL (ref 8.7–10.5)
CHLORIDE SERPL-SCNC: 109 MMOL/L (ref 95–110)
CHOLEST SERPL-MCNC: 155 MG/DL (ref 120–199)
CHOLEST/HDLC SERPL: 2.4 {RATIO} (ref 2–5)
CO2 SERPL-SCNC: 26 MMOL/L (ref 23–29)
CREAT SERPL-MCNC: 2.1 MG/DL (ref 0.5–1.4)
EST. GFR  (AFRICAN AMERICAN): 36.6 ML/MIN/1.73 M^2
EST. GFR  (NON AFRICAN AMERICAN): 31.6 ML/MIN/1.73 M^2
ESTIMATED AVG GLUCOSE: 186 MG/DL (ref 68–131)
GLUCOSE SERPL-MCNC: 159 MG/DL (ref 70–110)
HBA1C MFR BLD: 8.1 % (ref 4–5.6)
HDLC SERPL-MCNC: 65 MG/DL (ref 40–75)
HDLC SERPL: 41.9 % (ref 20–50)
LDLC SERPL CALC-MCNC: 77.2 MG/DL (ref 63–159)
NONHDLC SERPL-MCNC: 90 MG/DL
POTASSIUM SERPL-SCNC: 5 MMOL/L (ref 3.5–5.1)
PROT SERPL-MCNC: 6.2 G/DL (ref 6–8.4)
SODIUM SERPL-SCNC: 144 MMOL/L (ref 136–145)
TRIGL SERPL-MCNC: 64 MG/DL (ref 30–150)

## 2022-03-15 PROCEDURE — 80053 COMPREHEN METABOLIC PANEL: CPT | Performed by: NURSE PRACTITIONER

## 2022-03-15 PROCEDURE — 83036 HEMOGLOBIN GLYCOSYLATED A1C: CPT | Performed by: NURSE PRACTITIONER

## 2022-03-15 PROCEDURE — 80061 LIPID PANEL: CPT | Performed by: NURSE PRACTITIONER

## 2022-03-15 PROCEDURE — 36415 COLL VENOUS BLD VENIPUNCTURE: CPT | Mod: PO | Performed by: NURSE PRACTITIONER

## 2022-03-16 ENCOUNTER — OFFICE VISIT (OUTPATIENT)
Dept: INTERNAL MEDICINE | Facility: CLINIC | Age: 67
End: 2022-03-16
Payer: MEDICARE

## 2022-03-16 ENCOUNTER — LAB VISIT (OUTPATIENT)
Dept: LAB | Facility: HOSPITAL | Age: 67
End: 2022-03-16
Attending: INTERNAL MEDICINE
Payer: MEDICARE

## 2022-03-16 VITALS
DIASTOLIC BLOOD PRESSURE: 82 MMHG | HEIGHT: 67 IN | BODY MASS INDEX: 43.04 KG/M2 | WEIGHT: 274.25 LBS | TEMPERATURE: 98 F | SYSTOLIC BLOOD PRESSURE: 138 MMHG | HEART RATE: 59 BPM | OXYGEN SATURATION: 99 % | RESPIRATION RATE: 20 BRPM

## 2022-03-16 DIAGNOSIS — E11.59 HYPERTENSION ASSOCIATED WITH DIABETES: Chronic | ICD-10-CM

## 2022-03-16 DIAGNOSIS — I48.0 PAROXYSMAL ATRIAL FIBRILLATION: ICD-10-CM

## 2022-03-16 DIAGNOSIS — E66.01 MORBID OBESITY: ICD-10-CM

## 2022-03-16 DIAGNOSIS — N43.3 BILATERAL HYDROCELE: ICD-10-CM

## 2022-03-16 DIAGNOSIS — K74.69 OTHER CIRRHOSIS OF LIVER: ICD-10-CM

## 2022-03-16 DIAGNOSIS — Z79.4 TYPE 2 DIABETES MELLITUS WITH DIABETIC NEUROPATHY, WITH LONG-TERM CURRENT USE OF INSULIN: Chronic | ICD-10-CM

## 2022-03-16 DIAGNOSIS — R18.8 OTHER ASCITES: Chronic | ICD-10-CM

## 2022-03-16 DIAGNOSIS — E11.40 TYPE 2 DIABETES MELLITUS WITH DIABETIC NEUROPATHY, WITH LONG-TERM CURRENT USE OF INSULIN: Chronic | ICD-10-CM

## 2022-03-16 DIAGNOSIS — E11.69 HYPERLIPIDEMIA ASSOCIATED WITH TYPE 2 DIABETES MELLITUS: ICD-10-CM

## 2022-03-16 DIAGNOSIS — Z95.1 HX OF CABG: ICD-10-CM

## 2022-03-16 DIAGNOSIS — I15.2 HYPERTENSION ASSOCIATED WITH DIABETES: Primary | ICD-10-CM

## 2022-03-16 DIAGNOSIS — K76.6 PORTAL HYPERTENSION DUE TO OBSTRUCTION OF EXTRAHEPATIC PORTAL VEIN: Chronic | ICD-10-CM

## 2022-03-16 DIAGNOSIS — N18.30 TYPE 2 DIABETES MELLITUS WITH STAGE 3 CHRONIC KIDNEY DISEASE, WITH LONG-TERM CURRENT USE OF INSULIN, UNSPECIFIED WHETHER STAGE 3A OR 3B CKD: ICD-10-CM

## 2022-03-16 DIAGNOSIS — Z12.5 PROSTATE CANCER SCREENING: ICD-10-CM

## 2022-03-16 DIAGNOSIS — I81 PORTAL HYPERTENSION DUE TO OBSTRUCTION OF EXTRAHEPATIC PORTAL VEIN: Chronic | ICD-10-CM

## 2022-03-16 DIAGNOSIS — Z00.00 ANNUAL PHYSICAL EXAM: Primary | ICD-10-CM

## 2022-03-16 DIAGNOSIS — I25.10 CORONARY ARTERY DISEASE DUE TO CALCIFIED CORONARY LESION: Chronic | ICD-10-CM

## 2022-03-16 DIAGNOSIS — I25.84 CORONARY ARTERY DISEASE DUE TO CALCIFIED CORONARY LESION: Chronic | ICD-10-CM

## 2022-03-16 DIAGNOSIS — E11.22 TYPE 2 DIABETES MELLITUS WITH STAGE 3 CHRONIC KIDNEY DISEASE, WITH LONG-TERM CURRENT USE OF INSULIN, UNSPECIFIED WHETHER STAGE 3A OR 3B CKD: ICD-10-CM

## 2022-03-16 DIAGNOSIS — I89.0 LYMPHEDEMA OF BOTH LOWER EXTREMITIES: Chronic | ICD-10-CM

## 2022-03-16 DIAGNOSIS — Z79.4 TYPE 2 DIABETES MELLITUS WITH STAGE 3 CHRONIC KIDNEY DISEASE, WITH LONG-TERM CURRENT USE OF INSULIN, UNSPECIFIED WHETHER STAGE 3A OR 3B CKD: ICD-10-CM

## 2022-03-16 DIAGNOSIS — N18.30 STAGE 3 CHRONIC KIDNEY DISEASE, UNSPECIFIED WHETHER STAGE 3A OR 3B CKD: Chronic | ICD-10-CM

## 2022-03-16 DIAGNOSIS — E78.5 HYPERLIPIDEMIA ASSOCIATED WITH TYPE 2 DIABETES MELLITUS: ICD-10-CM

## 2022-03-16 DIAGNOSIS — I70.0 ATHEROSCLEROSIS OF AORTA: ICD-10-CM

## 2022-03-16 DIAGNOSIS — I50.42 CHRONIC COMBINED SYSTOLIC AND DIASTOLIC HEART FAILURE: ICD-10-CM

## 2022-03-16 DIAGNOSIS — I15.2 HYPERTENSION ASSOCIATED WITH DIABETES: Chronic | ICD-10-CM

## 2022-03-16 DIAGNOSIS — K74.00 HEPATIC FIBROSIS: ICD-10-CM

## 2022-03-16 DIAGNOSIS — E66.2 OBESITY HYPOVENTILATION SYNDROME: Chronic | ICD-10-CM

## 2022-03-16 DIAGNOSIS — E11.69 HYPERLIPIDEMIA ASSOCIATED WITH TYPE 2 DIABETES MELLITUS: Chronic | ICD-10-CM

## 2022-03-16 DIAGNOSIS — E13.9 LADA (LATENT AUTOIMMUNE DIABETES IN ADULTS), MANAGED AS TYPE 1: ICD-10-CM

## 2022-03-16 DIAGNOSIS — E78.5 HYPERLIPIDEMIA ASSOCIATED WITH TYPE 2 DIABETES MELLITUS: Chronic | ICD-10-CM

## 2022-03-16 DIAGNOSIS — E11.42 DIABETIC POLYNEUROPATHY ASSOCIATED WITH TYPE 2 DIABETES MELLITUS: ICD-10-CM

## 2022-03-16 DIAGNOSIS — E11.59 HYPERTENSION ASSOCIATED WITH DIABETES: Primary | ICD-10-CM

## 2022-03-16 LAB
BASOPHILS # BLD AUTO: 0.05 K/UL (ref 0–0.2)
BASOPHILS NFR BLD: 0.8 % (ref 0–1.9)
COMPLEXED PSA SERPL-MCNC: 1.2 NG/ML (ref 0–4)
DIFFERENTIAL METHOD: ABNORMAL
EOSINOPHIL # BLD AUTO: 0.2 K/UL (ref 0–0.5)
EOSINOPHIL NFR BLD: 3.7 % (ref 0–8)
ERYTHROCYTE [DISTWIDTH] IN BLOOD BY AUTOMATED COUNT: 13.2 % (ref 11.5–14.5)
HCT VFR BLD AUTO: 38.6 % (ref 40–54)
HGB BLD-MCNC: 12.2 G/DL (ref 14–18)
IMM GRANULOCYTES # BLD AUTO: 0.01 K/UL (ref 0–0.04)
IMM GRANULOCYTES NFR BLD AUTO: 0.2 % (ref 0–0.5)
LYMPHOCYTES # BLD AUTO: 1.7 K/UL (ref 1–4.8)
LYMPHOCYTES NFR BLD: 26.1 % (ref 18–48)
MCH RBC QN AUTO: 30.1 PG (ref 27–31)
MCHC RBC AUTO-ENTMCNC: 31.6 G/DL (ref 32–36)
MCV RBC AUTO: 95 FL (ref 82–98)
MONOCYTES # BLD AUTO: 0.4 K/UL (ref 0.3–1)
MONOCYTES NFR BLD: 6.8 % (ref 4–15)
NEUTROPHILS # BLD AUTO: 4 K/UL (ref 1.8–7.7)
NEUTROPHILS NFR BLD: 62.4 % (ref 38–73)
NRBC BLD-RTO: 0 /100 WBC
PLATELET # BLD AUTO: 159 K/UL (ref 150–450)
PMV BLD AUTO: 11.2 FL (ref 9.2–12.9)
RBC # BLD AUTO: 4.05 M/UL (ref 4.6–6.2)
WBC # BLD AUTO: 6.44 K/UL (ref 3.9–12.7)

## 2022-03-16 PROCEDURE — 3079F PR MOST RECENT DIASTOLIC BLOOD PRESSURE 80-89 MM HG: ICD-10-PCS | Mod: CPTII,S$GLB,, | Performed by: INTERNAL MEDICINE

## 2022-03-16 PROCEDURE — 36415 COLL VENOUS BLD VENIPUNCTURE: CPT | Mod: PO | Performed by: INTERNAL MEDICINE

## 2022-03-16 PROCEDURE — 99397 PR PREVENTIVE VISIT,EST,65 & OVER: ICD-10-PCS | Mod: S$GLB,,, | Performed by: INTERNAL MEDICINE

## 2022-03-16 PROCEDURE — 3079F DIAST BP 80-89 MM HG: CPT | Mod: CPTII,S$GLB,, | Performed by: INTERNAL MEDICINE

## 2022-03-16 PROCEDURE — 85025 COMPLETE CBC W/AUTO DIFF WBC: CPT | Performed by: INTERNAL MEDICINE

## 2022-03-16 PROCEDURE — 99999 PR PBB SHADOW E&M-EST. PATIENT-LVL IV: ICD-10-PCS | Mod: PBBFAC,,, | Performed by: INTERNAL MEDICINE

## 2022-03-16 PROCEDURE — 3066F NEPHROPATHY DOC TX: CPT | Mod: CPTII,S$GLB,, | Performed by: INTERNAL MEDICINE

## 2022-03-16 PROCEDURE — 3062F PR POS MACROALBUMINURIA RESULT DOCUMENTED/REVIEW: ICD-10-PCS | Mod: CPTII,S$GLB,, | Performed by: INTERNAL MEDICINE

## 2022-03-16 PROCEDURE — 3008F BODY MASS INDEX DOCD: CPT | Mod: CPTII,S$GLB,, | Performed by: INTERNAL MEDICINE

## 2022-03-16 PROCEDURE — 3052F HG A1C>EQUAL 8.0%<EQUAL 9.0%: CPT | Mod: CPTII,S$GLB,, | Performed by: INTERNAL MEDICINE

## 2022-03-16 PROCEDURE — 3008F PR BODY MASS INDEX (BMI) DOCUMENTED: ICD-10-PCS | Mod: CPTII,S$GLB,, | Performed by: INTERNAL MEDICINE

## 2022-03-16 PROCEDURE — 3075F SYST BP GE 130 - 139MM HG: CPT | Mod: CPTII,S$GLB,, | Performed by: INTERNAL MEDICINE

## 2022-03-16 PROCEDURE — 99999 PR PBB SHADOW E&M-EST. PATIENT-LVL IV: CPT | Mod: PBBFAC,,, | Performed by: INTERNAL MEDICINE

## 2022-03-16 PROCEDURE — 3075F PR MOST RECENT SYSTOLIC BLOOD PRESS GE 130-139MM HG: ICD-10-PCS | Mod: CPTII,S$GLB,, | Performed by: INTERNAL MEDICINE

## 2022-03-16 PROCEDURE — 84153 ASSAY OF PSA TOTAL: CPT | Performed by: INTERNAL MEDICINE

## 2022-03-16 PROCEDURE — 3062F POS MACROALBUMINURIA REV: CPT | Mod: CPTII,S$GLB,, | Performed by: INTERNAL MEDICINE

## 2022-03-16 PROCEDURE — 99397 PER PM REEVAL EST PAT 65+ YR: CPT | Mod: S$GLB,,, | Performed by: INTERNAL MEDICINE

## 2022-03-16 PROCEDURE — 3066F PR DOCUMENTATION OF TREATMENT FOR NEPHROPATHY: ICD-10-PCS | Mod: CPTII,S$GLB,, | Performed by: INTERNAL MEDICINE

## 2022-03-16 PROCEDURE — 3052F PR MOST RECENT HEMOGLOBIN A1C LEVEL 8.0 - < 9.0%: ICD-10-PCS | Mod: CPTII,S$GLB,, | Performed by: INTERNAL MEDICINE

## 2022-03-16 RX ORDER — CEPHALEXIN 500 MG/1
500 CAPSULE ORAL 3 TIMES DAILY
COMMUNITY
Start: 2022-02-18 | End: 2022-06-10 | Stop reason: ALTCHOICE

## 2022-03-16 RX ORDER — SUB-Q INSULIN DEVICE, 40 UNIT
EACH MISCELLANEOUS
COMMUNITY
Start: 2022-02-24 | End: 2022-03-22 | Stop reason: SDUPTHER

## 2022-03-16 NOTE — PROGRESS NOTES
Subjective:       Patient ID: Jian Arrieta is a 66 y.o. male.    Chief Complaint: Annual Exam and Pain (Rt arm & shoulder )    HPI   66 y.o. Male here for annual exam.     Vaccines: Influenza (2020); Tetanus (2017); Pneumococcal 23 (2020); Shingrix (will consider)  Eye exam: 6/21  Colonoscopy: 5/21    Exercise: no  Diet: regular     Past Medical History:  No date: Alcohol abuse  1/28/2019: Anasarca  No date: Anemia  No date: Anticoagulant long-term use  9/2/2015: Arthropathy associated with neurological disorder  No date: Atherosclerosis  No date: Charcot foot due to diabetes mellitus  01/29/2019: Chronic combined systolic and diastolic heart failure      Comment:  1-28-19 Left VentricleModerate decreased ejection                fraction at 30%. Normal left ventricular cavity size.                Normal wall thickness observed. Grade I (mild) left                ventricular diastolic dysfunction consistent with                impaired relaxation. Normal left atrial pressure.                Moderate, global hypokinesis(see wall scoring diagram).                Right VentricleNormal cavity size, wall thickness and                ejection fraction. Wall motion n  1/28/2019: Chronic pancreatitis  No date: CKD (chronic kidney disease) stage 3, GFR 30-59 ml/min  No date: CKD (chronic kidney disease) stage 4, GFR 15-29 ml/min  No date: Colon polyps      Comment:  approx 5 yrs ago  05/08/2015: Coronary artery disease due to calcified coronary lesion      Comment:  5 stents on ASA    9/2/2015: Diabetic polyneuropathy associated with type 2 diabetes   mellitus  1/28/2019: Diverticulosis  2/4/2019: DM type 2 with diabetic peripheral neuropathy  No date: Encounter for blood transfusion  1/28/2019: Essential hypertension  8/26/2015: Former smoker  6/20/2017: Healed ulcer of left foot on examination  No date: Hematuria  No date: Hydrocele      Comment:  approx 1.5 yrs ago  No date: Hyperphosphatemia  2/4/2019:  Hypoalbuminemia  No date: Hypocalcemia  1/29/2019: Lymphedema of both lower extremities  5/8/2015: Mixed hyperlipidemia  5/8/2015: Morbid obesity with BMI of 50.0-59.9, adult  5/24/2021: Obstruction of right ureteropelvic junction (UPJ) due to   stone  6/20/2017: Onychomycosis of multiple toenails with type 2 diabetes   mellitus and peripheral neuropathy  No date: Perianal cyst      Comment:  approx 2 yrs ago  No date: Proteinuria  1/28/2019: Pseudocyst of pancreas      Comment:  1-28-19 Liver has a cirrhotic morphology with no focal                lesions.  Significant interval increase in ascites when                compared to prior exam which may account for patient's                abdominal distension.  Hypodense air-fluid collection                along the body of the pancreas which is slightly smaller                when compared to prior CT.  Findings may relate to                pancreatic necrosis with pancreatic pseudocysts with                infected pseudocyst  No date: Skin cancer      Comment:  skin cancer  8/26/2015: Sleep apnea  1/11/2016: Status post bariatric surgery  5/8/2015: Type 2 diabetes mellitus, with long-term current use of   insulin  No date: Urinary tract infection  Past Surgical History:  6/28/2019: ANGIOGRAPHY; N/A      Comment:  Procedure: ANGIOGRAM-PV STENT;  Surgeon: Ewa Diagnostic               Provider;  Location: [facility] OR;  Service: Radiology;                 Laterality: N/A;  No date: ANGIOPLASTY      Comment:  total x5 stents  10/6/2015: COLONOSCOPY; N/A      Comment:  Procedure: COLONOSCOPY;  Surgeon: Shekhar Richards MD;                 Location: [facility] ([address] [place] [address]);  Service: Endoscopy;                 Laterality: N/A;  BMI over 55/2nd floor case5 day                hold Plavix, Dr Kwadwo Arroyo  5/13/2021: COLONOSCOPY; N/A      Comment:  Procedure: COLONOSCOPY;  Surgeon: Huan Brumfield MD;                Location: [facility] ENDO;  Service: Endoscopy;  Laterality:                 N/A;  3/20/2019: CORONARY ANGIOGRAPHY; Right      Comment:  Procedure: ANGIOGRAM, CORONARY ARTERY;  Surgeon: Bob Duque MD;  Location: [facility] CATH LAB;  Service:                Cardiology;  Laterality: Right;  2017: CORONARY ARTERY BYPASS GRAFT      Comment:  x3  3/20/2019: CORONARY BYPASS GRAFT ANGIOGRAPHY      Comment:  Procedure: Bypass graft study;  Surgeon: Bob Duque MD;  Location: [facility] CATH LAB;  Service:                Cardiology;;  No date: CYST REMOVAL  6/30/2021: CYSTOSCOPY; Right      Comment:  Procedure: CYSTOSCOPY;  Surgeon: William Diaz MD;                 Location: [facility] OR 1ST FLR;  Service: Urology;                 Laterality: Right;  6/16/2021: CYSTOSCOPY W/ URETERAL STENT PLACEMENT; Right      Comment:  Procedure: CYSTOSCOPY, WITH URETERAL STENT INSERTION;                 Surgeon: William Diaz MD;  Location: [facility] OR 2ND FLR;                 Service: Urology;  Laterality: Right;  FLUORO LESS THAN 1               HOUR  2/26/2020: ENDOSCOPIC ULTRASOUND OF UPPER GASTROINTESTINAL TRACT; N/A      Comment:  Procedure: ULTRASOUND, UPPER GI TRACT, ENDOSCOPIC;                 Surgeon: Robbie Yang MD;  Location: [facility];                 Service: Endoscopy;  Laterality: N/A;  7/8/2019: ESOPHAGOGASTRODUODENOSCOPY; N/A      Comment:  Procedure: ESOPHAGOGASTRODUODENOSCOPY (EGD);  Surgeon:                Huan Brumfield MD;  Location: [facility];  Service:                Endoscopy;  Laterality: N/A;  5/13/2021: ESOPHAGOGASTRODUODENOSCOPY; N/A      Comment:  Procedure: EGD (ESOPHAGOGASTRODUODENOSCOPY);  Surgeon:                Huan Brumfield MD;  Location: [facility];  Service:                Endoscopy;  Laterality: N/A;  6/16/2021: EXCISION OF HYDROCELE; Bilateral      Comment:  Procedure: HYDROCELE REPAIR;  Surgeon: William Diaz MD;  [facility];  Service: Urology;                 Laterality: Bilateral;  2  HOURS  6/16/2021: FLUOROSCOPY; N/A      Comment:  Procedure: FLUOROSCOPY;  Surgeon: William Diaz MD;                 Location: [facility] OR 2ND FLR;  Service: Urology;                 Laterality: N/A;  No date: GASTRECTOMY  5/17/2019: INSERTION OF DIALYSIS CATHETER; N/A      Comment:  Procedure: pleurx;  Surgeon: Ewa Diagnostic Provider;                 Location: [facility] OR 2ND FLR;  Service: General;                 Laterality: N/A;  Room [facility]  No date: KNEE ARTHROSCOPY  5/4/2020: LAPAROSCOPIC CHOLECYSTECTOMY; N/A      Comment:  Procedure: CHOLECYSTECTOMY, LAPAROSCOPIC;  Surgeon: SON Rowe MD;  Location: [facility] OR;  Service:                General;  Laterality: N/A;  6/30/2021: LASER LITHOTRIPSY; N/A      Comment:  Procedure: LITHOTRIPSY, USING LASER;  Surgeon: William Diaz MD;  Location: [facility] OR 1ST FLR;  Service: Urology;                 Laterality: N/A;  5/4/2020: LIVER BIOPSY; N/A      Comment:  Procedure: BIOPSY, LIVER, Laproscopic ;  Surgeon: SON Rowe MD;  Location: [facility] OR;  Service:                General;  Laterality: N/A;  No date: perianal surgery      Comment:  perianal cyst removed  6/30/2021: PYELOSCOPY; Right      Comment:  Procedure: PYELOSCOPY;  Surgeon: William Diaz MD;                 Location: [facility] OR 1ST FLR;  Service: Urology;                 Laterality: Right;  6/30/2021: REPLACEMENT OF STENT; Right      Comment:  Procedure: REPLACEMENT, STENT;  Surgeon: William Diaz MD;  Location: [facility] OR 1ST FLR;  Service: Urology;                 Laterality: Right;  6/30/2021: URETEROSCOPY; Right      Comment:  Procedure: URETEROSCOPY FLEXIBLE URETEROSCOPE;  Surgeon:               William Diaz MD;  Location: [facility] OR 1ST FLR;  Service:                Urology;  Laterality: Right;  Social History    Socioeconomic History      Marital status:     Tobacco Use      Smoking status: Former Smoker        Packs/day:  2.00        Years: 30.00        Pack years: 60        Types: Cigarettes        Quit date: 2005        Years since quittin.1      Smokeless tobacco: Never Used    Substance and Sexual Activity      Alcohol use: No        Comment: started ~, reports 1 shot daily, max 3 shots daily, vague about alcohol consumption. Last drink 2018      Drug use: No    Review of patient's allergies indicates:  No Known Allergies  Jian Arrieta had no medications administered during this visit.    Review of Systems   Constitutional: Negative for activity change, appetite change, chills, diaphoresis, fatigue, fever and unexpected weight change.   HENT: Negative for nasal congestion, mouth sores, postnasal drip, rhinorrhea, sinus pressure/congestion, sneezing, sore throat, trouble swallowing and voice change.    Eyes: Negative for discharge, itching and visual disturbance.   Respiratory: Negative for cough, chest tightness, shortness of breath and wheezing.    Cardiovascular: Positive for leg swelling. Negative for chest pain and palpitations.   Gastrointestinal: Negative for abdominal pain, blood in stool, constipation, diarrhea, nausea and vomiting.   Endocrine: Negative for cold intolerance and heat intolerance.   Genitourinary: Negative for difficulty urinating, dysuria, flank pain, hematuria and urgency.   Musculoskeletal: Negative for arthralgias, back pain, myalgias and neck pain.   Integumentary:  Negative for rash and wound.   Allergic/Immunologic: Negative for environmental allergies and food allergies.   Neurological: Negative for dizziness, tremors, seizures, syncope, weakness and headaches.   Hematological: Negative for adenopathy. Does not bruise/bleed easily.   Psychiatric/Behavioral: Negative for confusion, sleep disturbance and suicidal ideas. The patient is not nervous/anxious.          Objective:      Physical Exam  Constitutional:       General: He is not in acute distress.     Appearance: He is  well-developed. He is not diaphoretic.   HENT:      Head: Normocephalic and atraumatic.      Right Ear: External ear normal.      Left Ear: External ear normal.      Nose: Nose normal.      Mouth/Throat:      Pharynx: No oropharyngeal exudate.   Eyes:      General: No scleral icterus.        Right eye: No discharge.         Left eye: No discharge.      Conjunctiva/sclera: Conjunctivae normal.      Pupils: Pupils are equal, round, and reactive to light.   Neck:      Thyroid: No thyromegaly.      Vascular: No JVD.   Cardiovascular:      Rate and Rhythm: Normal rate and regular rhythm.      Heart sounds: Normal heart sounds. No murmur heard.  Pulmonary:      Effort: Pulmonary effort is normal. No respiratory distress.      Breath sounds: Normal breath sounds. No wheezing or rales.   Abdominal:      General: Bowel sounds are normal. There is no distension.      Palpations: Abdomen is soft.      Tenderness: There is no abdominal tenderness. There is no guarding.   Musculoskeletal:      Cervical back: Normal range of motion and neck supple.      Right lower leg: Edema (1+ pitting) present.      Left lower leg: Edema (1+ pitting) present.   Lymphadenopathy:      Cervical: No cervical adenopathy.   Skin:     General: Skin is warm and dry.      Coloration: Skin is not pale.      Findings: No rash.   Neurological:      Mental Status: He is alert and oriented to person, place, and time.   Psychiatric:         Judgment: Judgment normal.         Assessment:       Problem List Items Addressed This Visit        Neuro    Diabetic polyneuropathy associated with type 2 diabetes mellitus       Cardiac/Vascular    Hypertension associated with diabetes (Chronic)    Relevant Orders    CBC Auto Differential (Completed)    Hyperlipidemia associated with type 2 diabetes mellitus (Chronic)    Coronary artery disease due to calcified coronary lesion (Chronic)    Relevant Orders    CBC Auto Differential (Completed)    Paroxysmal atrial  fibrillation    Hx of CABG    Chronic combined systolic and diastolic heart failure    Atherosclerosis of aorta       Renal/    Chronic kidney disease, stage 3 (Chronic)    Bilateral hydrocele       Endocrine    Type 2 diabetes mellitus with diabetic neuropathy, with long-term current use of insulin (Chronic)    Type 2 diabetes mellitus with stage 3 chronic kidney disease, with long-term current use of insulin    Morbid obesity    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), managed as type 1    Diabetes mellitus due to underlying condition, uncontrolled, with renal complication       GI    Portal hypertension due to obstruction of extrahepatic portal vein (Chronic)    Other ascites (Chronic)    Other cirrhosis of liver    Hepatic fibrosis       Other    Obesity hypoventilation syndrome (Chronic)    Lymphedema of both lower extremities (Chronic)      Other Visit Diagnoses     Annual physical exam    -  Primary    Prostate cancer screening        Relevant Orders    PSA, Screening (Completed)          Plan:    Blood work reviewed with pt     Ascites/portal HTN/Liver fibrosis- significant improvement s/p venoplasty for portal vein occlusion        Followed by Hepatology       T2DM with neuropathy/CKD- last A1C of 8.1(3/22)<--7.1(5/19)<--7.4(3/19)<--6.9(1/19)       -followed by Endo      CHF/PAF- stable, CPT      CAD with hx of CABG- stable, CPT      HTN- stable, CPT      HLD- stable      CKD III- stable     Morbid Obesity- pt advised on proper diet/exercise for weight loss     Chronic LE lymphedema with stasis dermatitis- stable     NC/NC Anemia- stable     BERHANE- pt not using his CPAP     Aortic atherosclerosis- stable     Chronic pancreatitis- stable

## 2022-03-17 ENCOUNTER — TELEPHONE (OUTPATIENT)
Dept: INTERNAL MEDICINE | Facility: CLINIC | Age: 67
End: 2022-03-17
Payer: MEDICARE

## 2022-03-17 NOTE — TELEPHONE ENCOUNTER
----- Message from Leon Barajas DO sent at 3/17/2022  7:10 AM CDT -----  Blood counts stable  Normal prostate screen

## 2022-03-22 ENCOUNTER — OFFICE VISIT (OUTPATIENT)
Dept: INTERNAL MEDICINE | Facility: CLINIC | Age: 67
End: 2022-03-22
Payer: MEDICARE

## 2022-03-22 VITALS
TEMPERATURE: 98 F | RESPIRATION RATE: 14 BRPM | DIASTOLIC BLOOD PRESSURE: 80 MMHG | HEART RATE: 75 BPM | HEIGHT: 67 IN | SYSTOLIC BLOOD PRESSURE: 150 MMHG | OXYGEN SATURATION: 100 % | BODY MASS INDEX: 43.32 KG/M2 | WEIGHT: 276 LBS

## 2022-03-22 DIAGNOSIS — E11.69 TYPE 2 DIABETES MELLITUS WITH OTHER SPECIFIED COMPLICATION, WITH LONG-TERM CURRENT USE OF INSULIN: ICD-10-CM

## 2022-03-22 DIAGNOSIS — E78.5 HYPERLIPIDEMIA ASSOCIATED WITH TYPE 2 DIABETES MELLITUS: Chronic | ICD-10-CM

## 2022-03-22 DIAGNOSIS — Z79.4 TYPE 2 DIABETES MELLITUS WITH STAGE 3 CHRONIC KIDNEY DISEASE, WITH LONG-TERM CURRENT USE OF INSULIN, UNSPECIFIED WHETHER STAGE 3A OR 3B CKD: ICD-10-CM

## 2022-03-22 DIAGNOSIS — N18.30 TYPE 2 DIABETES MELLITUS WITH STAGE 3 CHRONIC KIDNEY DISEASE, WITH LONG-TERM CURRENT USE OF INSULIN, UNSPECIFIED WHETHER STAGE 3A OR 3B CKD: ICD-10-CM

## 2022-03-22 DIAGNOSIS — E11.69 HYPERLIPIDEMIA ASSOCIATED WITH TYPE 2 DIABETES MELLITUS: Chronic | ICD-10-CM

## 2022-03-22 DIAGNOSIS — I25.84 CORONARY ARTERY DISEASE DUE TO CALCIFIED CORONARY LESION: Chronic | ICD-10-CM

## 2022-03-22 DIAGNOSIS — I15.2 HYPERTENSION ASSOCIATED WITH DIABETES: Chronic | ICD-10-CM

## 2022-03-22 DIAGNOSIS — N18.30 STAGE 3 CHRONIC KIDNEY DISEASE, UNSPECIFIED WHETHER STAGE 3A OR 3B CKD: Chronic | ICD-10-CM

## 2022-03-22 DIAGNOSIS — E11.59 HYPERTENSION ASSOCIATED WITH DIABETES: Chronic | ICD-10-CM

## 2022-03-22 DIAGNOSIS — Z95.1 HX OF CABG: ICD-10-CM

## 2022-03-22 DIAGNOSIS — Z98.84 STATUS POST BARIATRIC SURGERY: ICD-10-CM

## 2022-03-22 DIAGNOSIS — I25.10 CORONARY ARTERY DISEASE DUE TO CALCIFIED CORONARY LESION: Chronic | ICD-10-CM

## 2022-03-22 DIAGNOSIS — E13.9 LADA (LATENT AUTOIMMUNE DIABETES IN ADULTS), MANAGED AS TYPE 1: Primary | ICD-10-CM

## 2022-03-22 DIAGNOSIS — I48.0 PAROXYSMAL ATRIAL FIBRILLATION: ICD-10-CM

## 2022-03-22 DIAGNOSIS — E11.22 TYPE 2 DIABETES MELLITUS WITH STAGE 3 CHRONIC KIDNEY DISEASE, WITH LONG-TERM CURRENT USE OF INSULIN, UNSPECIFIED WHETHER STAGE 3A OR 3B CKD: ICD-10-CM

## 2022-03-22 DIAGNOSIS — Z79.4 TYPE 2 DIABETES MELLITUS WITH OTHER SPECIFIED COMPLICATION, WITH LONG-TERM CURRENT USE OF INSULIN: ICD-10-CM

## 2022-03-22 PROCEDURE — 1159F MED LIST DOCD IN RCRD: CPT | Mod: CPTII,S$GLB,, | Performed by: NURSE PRACTITIONER

## 2022-03-22 PROCEDURE — 3062F PR POS MACROALBUMINURIA RESULT DOCUMENTED/REVIEW: ICD-10-PCS | Mod: CPTII,S$GLB,, | Performed by: NURSE PRACTITIONER

## 2022-03-22 PROCEDURE — 3066F PR DOCUMENTATION OF TREATMENT FOR NEPHROPATHY: ICD-10-PCS | Mod: CPTII,S$GLB,, | Performed by: NURSE PRACTITIONER

## 2022-03-22 PROCEDURE — 3288F FALL RISK ASSESSMENT DOCD: CPT | Mod: CPTII,S$GLB,, | Performed by: NURSE PRACTITIONER

## 2022-03-22 PROCEDURE — 3066F NEPHROPATHY DOC TX: CPT | Mod: CPTII,S$GLB,, | Performed by: NURSE PRACTITIONER

## 2022-03-22 PROCEDURE — 95251 CONT GLUC MNTR ANALYSIS I&R: CPT | Mod: S$GLB,,, | Performed by: NURSE PRACTITIONER

## 2022-03-22 PROCEDURE — 3052F HG A1C>EQUAL 8.0%<EQUAL 9.0%: CPT | Mod: CPTII,S$GLB,, | Performed by: NURSE PRACTITIONER

## 2022-03-22 PROCEDURE — 99999 PR PBB SHADOW E&M-EST. PATIENT-LVL V: ICD-10-PCS | Mod: PBBFAC,,, | Performed by: NURSE PRACTITIONER

## 2022-03-22 PROCEDURE — 3052F PR MOST RECENT HEMOGLOBIN A1C LEVEL 8.0 - < 9.0%: ICD-10-PCS | Mod: CPTII,S$GLB,, | Performed by: NURSE PRACTITIONER

## 2022-03-22 PROCEDURE — 1159F PR MEDICATION LIST DOCUMENTED IN MEDICAL RECORD: ICD-10-PCS | Mod: CPTII,S$GLB,, | Performed by: NURSE PRACTITIONER

## 2022-03-22 PROCEDURE — 99214 OFFICE O/P EST MOD 30 MIN: CPT | Mod: S$GLB,,, | Performed by: NURSE PRACTITIONER

## 2022-03-22 PROCEDURE — 3077F SYST BP >= 140 MM HG: CPT | Mod: CPTII,S$GLB,, | Performed by: NURSE PRACTITIONER

## 2022-03-22 PROCEDURE — 1101F PT FALLS ASSESS-DOCD LE1/YR: CPT | Mod: CPTII,S$GLB,, | Performed by: NURSE PRACTITIONER

## 2022-03-22 PROCEDURE — 1160F RVW MEDS BY RX/DR IN RCRD: CPT | Mod: CPTII,S$GLB,, | Performed by: NURSE PRACTITIONER

## 2022-03-22 PROCEDURE — 3077F PR MOST RECENT SYSTOLIC BLOOD PRESSURE >= 140 MM HG: ICD-10-PCS | Mod: CPTII,S$GLB,, | Performed by: NURSE PRACTITIONER

## 2022-03-22 PROCEDURE — 3008F PR BODY MASS INDEX (BMI) DOCUMENTED: ICD-10-PCS | Mod: CPTII,S$GLB,, | Performed by: NURSE PRACTITIONER

## 2022-03-22 PROCEDURE — 3062F POS MACROALBUMINURIA REV: CPT | Mod: CPTII,S$GLB,, | Performed by: NURSE PRACTITIONER

## 2022-03-22 PROCEDURE — 99214 PR OFFICE/OUTPT VISIT, EST, LEVL IV, 30-39 MIN: ICD-10-PCS | Mod: S$GLB,,, | Performed by: NURSE PRACTITIONER

## 2022-03-22 PROCEDURE — 3288F PR FALLS RISK ASSESSMENT DOCUMENTED: ICD-10-PCS | Mod: CPTII,S$GLB,, | Performed by: NURSE PRACTITIONER

## 2022-03-22 PROCEDURE — 99999 PR PBB SHADOW E&M-EST. PATIENT-LVL V: CPT | Mod: PBBFAC,,, | Performed by: NURSE PRACTITIONER

## 2022-03-22 PROCEDURE — 3079F PR MOST RECENT DIASTOLIC BLOOD PRESSURE 80-89 MM HG: ICD-10-PCS | Mod: CPTII,S$GLB,, | Performed by: NURSE PRACTITIONER

## 2022-03-22 PROCEDURE — 1101F PR PT FALLS ASSESS DOC 0-1 FALLS W/OUT INJ PAST YR: ICD-10-PCS | Mod: CPTII,S$GLB,, | Performed by: NURSE PRACTITIONER

## 2022-03-22 PROCEDURE — 1160F PR REVIEW ALL MEDS BY PRESCRIBER/CLIN PHARMACIST DOCUMENTED: ICD-10-PCS | Mod: CPTII,S$GLB,, | Performed by: NURSE PRACTITIONER

## 2022-03-22 PROCEDURE — 3079F DIAST BP 80-89 MM HG: CPT | Mod: CPTII,S$GLB,, | Performed by: NURSE PRACTITIONER

## 2022-03-22 PROCEDURE — 1126F AMNT PAIN NOTED NONE PRSNT: CPT | Mod: CPTII,S$GLB,, | Performed by: NURSE PRACTITIONER

## 2022-03-22 PROCEDURE — 3008F BODY MASS INDEX DOCD: CPT | Mod: CPTII,S$GLB,, | Performed by: NURSE PRACTITIONER

## 2022-03-22 PROCEDURE — 1126F PR PAIN SEVERITY QUANTIFIED, NO PAIN PRESENT: ICD-10-PCS | Mod: CPTII,S$GLB,, | Performed by: NURSE PRACTITIONER

## 2022-03-22 PROCEDURE — 95251 PR GLUCOSE MONITOR, 72 HOUR, PHYS INTERP: ICD-10-PCS | Mod: S$GLB,,, | Performed by: NURSE PRACTITIONER

## 2022-03-22 RX ORDER — SUB-Q INSULIN DEVICE, 40 UNIT
1 EACH MISCELLANEOUS DAILY
Qty: 30 EACH | Refills: 11 | Status: SHIPPED | OUTPATIENT
Start: 2022-03-22 | End: 2022-06-10 | Stop reason: ALTCHOICE

## 2022-03-22 RX ORDER — MUPIROCIN 20 MG/G
OINTMENT TOPICAL 3 TIMES DAILY
Qty: 30 G | Refills: 0 | Status: SHIPPED | OUTPATIENT
Start: 2022-03-22 | End: 2022-06-14

## 2022-03-22 RX ORDER — INSULIN PUMP CART,CONT INF,RF
1 CARTRIDGE (EA) SUBCUTANEOUS EVERY OTHER DAY
Qty: 15 EACH | Refills: 11 | Status: SHIPPED | OUTPATIENT
Start: 2022-03-22 | End: 2022-06-14

## 2022-03-22 RX ORDER — INSULIN LISPRO 100 [IU]/ML
INJECTION, SOLUTION INTRAVENOUS; SUBCUTANEOUS
Qty: 90 ML | Refills: 3 | Status: SHIPPED | OUTPATIENT
Start: 2022-03-22 | End: 2022-06-14

## 2022-03-22 NOTE — PATIENT INSTRUCTIONS
"Continue on vgo 30 patch. Give 5 clicks with meals.   1 click with snack.   If glucose low (less than 70) - no clicks, just eat.     Consider switching from VGO to omnipod patch instead -   This would allow me to give you a customized dose of insulin.     For low blood sugar - remember to keep glucose tablets on hand. You can purchase these at the pharmacy check out desk/over the counter.   If blood sugar is less than 70, take/eat 2 - 3 tablets quickly to bring your sugar up.   Always keep 15 grams of Quick acting carbohydrates on hand to eat/drink if your sugar is low - examples are 1/2 cup of juice, coke, or crackers, granola bars.   Remember to eat meals frequently to prevent low blood blood sugars.      Low Carb Snacks & Diabetes friendly foods   Aim for 30 - 40 grams of carbs for 3 meals per day (or 2 meals and  1 - 2 snacks - however you prefer)  Snacks can be 15 - 20 grams of carbs.     Eating small, frequent meals will help you to feel more satisfied, and more full so that you don't over eat or eat the wrong foods later.   Also, nathaniel meals/snacks allow you to spread carbohydrates evenly, which may stabilize blood sugars.   Below you will find a list of ideas of foods that I like/prefer.   Feel free to give me your ideas and tips if you find good ones too!   Overall - please remember to limit refined sugars such as soft drinks, juices, rices, pastas, breads, cakes.   Look at the back of the label - look at the amount of "carbohydrates", then look at the amount of "fiber" - subtract the amount of fiber from the amount of carbs - this is your "net carb intake".  The more fiber a food has, the better generally, as you get to subtract this from the net carbs.     0-5 gm carb  Crystal Light flavoring (I like fruit punch flavor!)  Vitamin Water Zero  Mamta antioxidant drinks.   Sparkling ice (long skinny flavored water bottles for $1 that are sugar free).   Mehdi water, WaterLoo - carbonated flavored herndon, " "various flavors.  Diet coke, diet barqs, sprite zero.  Diet sunkist (orange drink)  Sugar free powerade  Herbal tea, unsweetened  2 tsp peanut butter on celery or carrots  Bethany's original Candies - (the Sugar Free ones!)  1/2 cup sugar-free jell-o  1 sugar-free popsicle  ¼ cup blueberries or strawberries  8oz Blue Rebecca unsweetened almond milk (or there is sugar free vanilla flavored as well).  5 baby carrots & celery sticks, cucumbers, bell peppers dipped in ¼ cup salsa, 2Tbsp light ranch dressing or 2Tbsp plain Greek yogurt  10 Goldfish crackers  ½ oz low-fat cheese or string cheese  1 closed handful of nuts, unsalted (example-->almonds, pistachios, cashews, peanuts, etc).  1 Tbsp of sunflower seeds, unsalted  1 cup Smart Pop popcorn  1 whole grain brown rice cake   "Think Thin" protein bars - my personal favorite is Creamy Peanut butter, chocolate brownie, or oreo flavors.  "Bonner" bars  - can be found at [employer] grocery store - they have 5 grams of net carbohydrates.  Quest bars (my favorite is birthday cake, and also cinnamon roll is good melted for 10 seconds in the microwave - please remove the wrapper first!)  Premier protein shakes - sold at [employer], or other brand alternatives - usually 1 - 2 grams of carbs (strawberry, vanilla, chocolate flavors) Coffee flavor is my new morning favorite!   Scrambled eggs! Or a fried egg or boiled eggs - add sliced tomatoes, cilantro or some chopped green onions.   Eggs are good with any and all veggies! You can even eat them for dinner or in a low carb tortilla, or served with your favorite grilled meat/sausage or galo is my favorite.   Check out pre-made egg scrambles in the egg grocery store section - Ore Joann "just crack an egg" is premixed cheese, galo and small amount of pototes, small cup size portions for your breakfast - very convenient!   Colt peralta - zucchini - can buy in the frozen foods section. Birds eye brand is usually cheap. Tip - saute with " "oil/onions or italian seasoning and use this as a pasta substitution.  Milk - is usually high in carbs/sugar - use Yippy milk brand instead - it is "filtered" milk - with half the amount of carbs of regular milk. (next to the milk in milk aisle).   Smuckers sugar free Breakfast syrup (or 1 tablespoon of low sugar breakfast syrup) instead of regular syrup.        15 gm carb  1 small piece of fruit or ½ banana or 1/2 cup lite canned fruit  3 anna cracker squares  3 cups Smart Pop popcorn, top spray butter, Estrada lite salt or cinnamon and Truvia  5 Vanilla Wafers  ½ cup low fat, no added sugar ice cream or frozen yogurt (Blue bell, Blue Bunny, Weight Watchers, Skinny Cow)  1/2 - 1 cup Light n' fit Vanilla yogurt (has added protein in it to make you feel full).   ½ turkey, ham, or chicken sandwich  ½ c fruit with ½ c Cottage cheese  4-6 unsalted wheat crackers with 1 oz low fat cheese or 1 tbsp peanut butter   30-45 goldfish crackers (depending on flavor)   7-8 Rastafarian mini brown rice cakes (caramel, apple cinnamon, chocolate)   12 Rastafarian mini brown rice cakes (cheddar, bbq, ranch)   1/3 cup hummus dip with raw veg  1/2 whole wheat tomasz, 1Tbsp hummus  Mini Pizza (1/2 whole wheat English muffin, low-fat  cheese, tomato sauce)  100 calorie snack pack (Oreo, Chips Ahoy, Ritz Mix, Baked Cheetos)  4-6 oz. light or Greek Style yogurt (Chobani, Yoplait, Okios, Stoneyfield)  ½ cup sugar-free pudding    6 in. wheat tortilla or tomasz oven toasted chips (topped with spray butter flavoring, cinnamon, Truvia OR spray butter, garlic powder, chili powder)   18 BBQ Popchips (available at [employer], Whole Foods, [employer])  Mini bagel (small size) toasted - add fat free cream cheese or avocado and sliced tomato on top - yum!   1/2 cup Halo top icecream - birthday cake is my go-to flavor.   Truth Bars - can be found at [employer] - Net carbs is 11 - 12 grams depending on the flavor.   Kind bars = mostly nuts and dried berries - " "find at most local groceries stores, drug stores, whole foods, fresh market. Net carbs is around 10 grams.     Smoothie Martín - I'm often asked "what smoothie is healthy for me". Get the 20 oz. Size.   Do not be decieved to think that all smoothies are "healthy". In fact, most are loaded with hidden sugars.   Here are some from the menu that you are allowed to have being diabetic:   The gladiator - any flavor. This is the lowest in carbs (3 - 5 grams only)  Keto champ (berry, chocolate or coffee - all have 14 - 19 grams of carbs in 20 oz size).   The Lean 1 smoothies - vanilla, strawberry and chocolate have under 30 grams of carbs, so this is slightly more. But would be ok for a meal substitute or snack.   The Shredder in Vanilla or chocolate only.  17 grams of carbs - (the strawberry has more sugar considerably)    Tips and Tricks:     Eat an extra vegetable once per day - green veggies (such as broccoli, cauliflower, green beans) - make you feel full and are low in sugar.     My favorite "secret" seasoning to make things extra yummy is cinnamon for sweet taste   For an added secret "salty" taste - add "everything but the bagel" seasoning (you can get on amazon.com or at  joes. I have seen at local groceries such as [employer] too!).     Dark colored fruits are your friend -- blueberries, strawberries, raspberries, blackberries. They have a lower sugar content. So will be the best choice for you.   Light colored fruits are NOT your friend - they tend to be higher in sugar and are not the best options for an ADA diet - examples are oranges, bananas, peaches, watermelon, -- you can eat these, but carefully and in moderation.     WATER. I cannot emphasize drinking water enough - it will hydrate you, make you full. Your body needs it - it's a natural appetite suppressant.   Sometimes hunger and thirst can feel the same. Try drinking some water first, then eat if you are still hungry.     Other snack choices   Celery " "with peanut butter  Celery with tuna salad  Dill pickles and cheddar cheese (no kidding, it's a great combo)  Nuts (keep raw ones in the freezer if you think you'll overeat them)  Sunflower seeds (get them in the shell so it will take longer to eat them)  Other seeds (How to Toast Pumpkin or Squash Seeds)   Pistachios or almonds - will fill you up and are tasty!   Low-Carb Trail Mix  Jerky (beef or turkey -- try to find low-sugar varieties)  Salami slices (you can find in the deli section)  Cheese sticks, such as string cheese  Sugar-free Jello, alone or with cottage cheese and a sprinkling of nuts. Make sugar-free lime Jello with part coconut milk -- For a large package, dissolve the powder in a cup of boiling water, add a can of coconut milk, and then add the rest of the water. Stir well.  Pepperoni "chips" -- Zap the slices in the microwave  Cheese with a few apple slices  4-ounce plain or sugar-free yogurt with berries and flax seed meal  Smoked salmon and cream cheese on cucumber slices  Lettuce Roll-ups -- Roll luncheon meat, egg salad, tuna or other filling and veggies in lettuce leaves  Lunch Meat Roll-ups -- Roll cheese or veggies in lunch meat (read the labels for carbs on the lunch meat)  Spread bean dip, spinach dip, or other low-carb dip or spread on the lunch meat or lettuce and then roll it up  Raw veggies and spinach dip, or other low-carb dip  Pork rinds (Chicharrón), with or without dip  Ricotta cheese with fruit and/or nuts and/or flax seed meal  Mushrooms with cheese spread inside (or other spreads or dips)  Low-carb snack bars (watch out for sugar alcohols, especially maltitol)  Product Review: Atkins Advantage Bars  Pepperoni Chips -- Microwave pepperoni slices until crisp. Great with cheeses and dips  Garlic Parmesan Flax Seed Crackers  Parmesan Crisps -- Good when you want a crunchy snack.  Peanut Butter Protein Balls     "

## 2022-03-22 NOTE — PROGRESS NOTES
66 y.o. gentleman, here for 3 month follow up visit oer routine for Eloise - T1dm -   Last seen in December 2021 - those notes are below.     a1c decreased from 8.6% to 8.1%.   He increased from the vgo 20 to vgo 30 - still using humalog insulin   That has helped his overnight sugars, but he is still high during the day.   He often gives 4 clicks instead of 3 clicks with meals.   States sometimes he runs out of insulin in his patch if he gives too much.   We had discussed omnipod at his last visit, but he wanted to try the higher dose vgo patch.   He is not following Ada diet -     On dexcom G6 personal to check his sugars  Downloaded and reviewed today -   See scanned in  -   Average glucose 239 -   30% time in range.   <1% lows.   27% highs.   42% very highs.       Last visit notes from December 2021 -   66 y.o. T1dm - ELOISE - 3 month follow up.   Last seen in Septemer 2021 - those notes are velow.     His a1c remains elevated still @ 8.6%   Continues on VGO 20 -   States using same insulin - humalog -  4 clicks with meals.   No longer on metformin.   He has history of pancreatitis (2018) -  So cannot take glp1 or dpp4's -     He is not following ADA diet.   Eats out often, fried foods, high carb foods.   States he occasionally has lows when he gives boluses after he eats instead of before the meal.   He complains of right shoulder pain today -   Follows with Dr. Diaz - and is seeing him for kidney stone follow up.   Also c/o ED - has used viagra in past. Requests appt. With Dr. Diaz    Continues on Dexcom G6 - see scanned in .   Average glucose 258 -   24% time in range.   1% lows.   <1% very low. (states following a bolus/correction bolus).   26% highs.   48% extreme highs.       Last visit notes as follows from September 2021:   HPI: Jian JASMINE Meenakshi is a 66 y.o.  male c/I for visit to address Diabetes Type 1 (ELOISE adult onset)   This is the first time I am seeing him for care, follows with  Dr. Leon Barajas, DO for primary care needs.   Has seen endocrinology - Luisa Garcia NP in past - last visit was 8/9/2021 - those notes are below.  Had seen Dr. Carney and dr. Stauffer with endocrinology in past prior visits.     he is establishing care with me today however.   Met last with GINO Machado 8/23/2021 -     was diagnosed with T2DM originally in 2016 -   He has history of pancreatitis in 2018 - portal vein thrombosis and underwent angioplasty.   now treated as T1 - ELOISE -   Had gallbladder removed as well.     Is on insulin only now via vgo patch - on VGO 20    Has never been hospitalized r/t DM.  Denies missing doses of DM medication - however admits he misses he meal time dose of insulin.     Hgba1c has increased from 7.7%  to 8.6%- now improved to 8.3% currently.   Not always following ADA diet. Eats out 2 - 3 times per day.   Local restaurants/pastas/fried food/waffle house.   vgo 20 - 2 - 3 clicks with breakfast.   4 clicks with lunch and dinner.   1 click with a snack.     He reports forgetting to click sometimes - and waits to click after meals.   Also fearful of hypoglycemia - so will under click.   Working in yard/increased activity or sweating - has low blood sugars.   He has baqsimi to correct     On dexcom g6 for checking sugars - downloaded and reviewed today -   Average glucose 243  32% time in range.   0% lows.   24% highs.   44% extreme highs.     Complications from diabetes and pertinent co-morbidities include:   Negative for DKA.   Has been on insulin for several years.   + for diabetic neuropathy.   + for nephropathy - CKD stage 3   + microalbuminuria.   Eyes:  negative for Diabetic retinopathy   CV: Denies history of MI nor stroke.   CAD: yes and history of CABG   Also heart failure history   Takes aspirin 81mg tablet daily  BP: has history of HTN  Statin: Taking  ACE/ARB: Not taking    Last notes from RADHA Garcia from August 2021 as follows:   Pt returns for follow up of  type 2 diabetes complicated by Charcot foot, foot ulcer-now resolved, kidney stone, BMI 39, CKD stage III, s/p sleeve gastrectomy that is uncontrolled and complicated.  Previously seen by Dr. Carney and Dr. Stauffer and myself. Last visit in April 2021.   He has dentures!!    He had hydrocele repair on 6/16/2021 -- also had kidney stones at the time. Had stent placed and removed. States he has kidney stones on the left and will be seeing Dr. Diaz.      With regards to the diabetes:   Diagnosed with Type 2 DM in 2016  Episode of pancreatitis around 2018,   Family History of Type 2 DM: none  Known complications:   DKA/HHS: none  RN: none; 6/10/21 -  PN: +; was seeing podiatry in the past  Nephropathy: + sees nephrology-- needs appt  Gastroparesis: none  CAD: CABG around 2016  Diabetes complications: CKD stage III, s/p sleeve gastrectomy   Current regimen:  Vgo 20 4 clicks with oatmeal and 3 clicks with breakfast; 4 clicks with lunch and dinner; 1-2 clicks with a snack; sometimes more for a snack   He is not running out of clicks at the end of the day.    He is sometimes taking Vgo off overnight to prevent hypoglycemia. States he is sometimes having hypoglycemia between 1-4AM. States he started doing this a couple of months ago.Takes off around 9-10PM and wakes up at 4-5AM and puts new Vgo on at 6AM. He is eating around 5-6 AM and puts on Vgo after he eats. In the past he was wearing Vgo for over 24 hours, still doing this.    He is fearful of hypoglycemia and overcorrecting.   Missed doses-missing clicks as above   Other medications tried:  Metformin   He is checking BG 4 times daily  Wearing Dexcom today. See media tab for report  His blood sugars are elevated after breakfast and throughout the day. He is putting on a new device sometimes after he eats breakfast which contributes to his hyperglycemia. He is having hyperglycemia at times overnight and he will take off Vgo.   He forgetting to click at times or clicking  after he eats.  On days that he forgets to click, his blood sugars stay persistently high. His blood sugars come down nicely when he clicks.    He reports Dexcom and Vgo is falling off due to sweating badly.   Fasting:    Patient feels unwell with blood sugars less than 110. He loses ability to function with low blood sugars. His lowest blood sugar has been in the 40s (around 2020). Seen at [facility].    Dietary recall: He is not following diabetic diet. Appetite is good. Does not eat as much as before.   Eats 3 meals a day.   B: 5AM-waffle house or oatmeal or grits  L: 11:30  D: 7PM  Snacks: Grazing during the day  Drinks: tea and water    Exercise - tried to stay active.  Active in the yard, garage.    Education - last visit: 2021 -EZEQUIEL Palacios/[place]: has    Social History     Tobacco Use   Smoking Status Former Smoker    Packs/day: 2.00    Years: 30.00    Pack years: 60.00    Types: Cigarettes    Quit date: 2005    Years since quittin.1   Smokeless Tobacco Never Used     Past medical History:   Past Medical History:   Diagnosis Date    Alcohol abuse     Anasarca 2019    Anemia     Anticoagulant long-term use     Arthropathy associated with neurological disorder 2015    Atherosclerosis     Charcot foot due to diabetes mellitus     Chronic combined systolic and diastolic heart failure 2019 Left VentricleModerate decreased ejection fraction at 30%. Normal left ventricular cavity size. Normal wall thickness observed. Grade I (mild) left ventricular diastolic dysfunction consistent with impaired relaxation. Normal left atrial pressure. Moderate, global hypokinesis(see wall scoring diagram). Right VentricleNormal cavity size, wall thickness and ejection fraction. Wall motion n    Chronic pancreatitis 2019    CKD (chronic kidney disease) stage 3, GFR 30-59 ml/min     CKD (chronic kidney disease) stage 4, GFR 15-29 ml/min     Colon  polyps     approx 5 yrs ago    Coronary artery disease due to calcified coronary lesion 05/08/2015    5 stents on ASA      Diabetic polyneuropathy associated with type 2 diabetes mellitus 9/2/2015    Diverticulosis 1/28/2019    DM type 2 with diabetic peripheral neuropathy 2/4/2019    Encounter for blood transfusion     Essential hypertension 1/28/2019    Former smoker 8/26/2015    Healed ulcer of left foot on examination 6/20/2017    Hematuria     Hydrocele     approx 1.5 yrs ago    Hyperphosphatemia     Hypoalbuminemia 2/4/2019    Hypocalcemia     Lymphedema of both lower extremities 1/29/2019    Mixed hyperlipidemia 5/8/2015    Morbid obesity with BMI of 50.0-59.9, adult 5/8/2015    Obstruction of right ureteropelvic junction (UPJ) due to stone 5/24/2021    Onychomycosis of multiple toenails with type 2 diabetes mellitus and peripheral neuropathy 6/20/2017    Perianal cyst     approx 2 yrs ago    Proteinuria     Pseudocyst of pancreas 1/28/2019 1-28-19 Liver has a cirrhotic morphology with no focal lesions.  Significant interval increase in ascites when compared to prior exam which may account for patient's abdominal distension.  Hypodense air-fluid collection along the body of the pancreas which is slightly smaller when compared to prior CT.  Findings may relate to pancreatic necrosis with pancreatic pseudocysts with infected pseudocyst    Skin cancer     skin cancer    Sleep apnea 8/26/2015    Status post bariatric surgery 1/11/2016    Type 2 diabetes mellitus, with long-term current use of insulin 5/8/2015    Urinary tract infection       Family hx:   Family History   Problem Relation Age of Onset    Cancer Mother     Cancer Father     Heart disease Father     Obesity Sister     Parkinsonism Brother     No Known Problems Paternal Grandmother     Cancer Paternal Grandfather     Cancer Brother     Diabetes Maternal Grandmother     Stroke Maternal Grandfather     Cirrhosis  "Neg Hx       Current meds:   Current Outpatient Medications:     apixaban (ELIQUIS) 5 mg Tab, Take 1 tablet (5 mg total) by mouth 2 (two) times daily., Disp: 60 tablet, Rfl: 11    aspirin (ECOTRIN) 81 MG EC tablet, Take 1 tablet (81 mg total) by mouth once daily., Disp: 9 tablet, Rfl: 0    blood sugar diagnostic Strp, One touch verio To check blood sugar 4 times daily, Disp: 200 strip, Rfl: 1    blood-glucose meter kit, To check BG 4 times daily, to use with insurance preferred meter, Disp: 1 each, Rfl: 0    cephALEXin (KEFLEX) 500 MG capsule, Take 500 mg by mouth 3 (three) times daily., Disp: , Rfl:     doxycycline (VIBRAMYCIN) 100 MG Cap, daily as needed. , Disp: , Rfl:     lancets Misc, To check BG 4 times daily, to use with insurance preferred meter, Disp: 100 each, Rfl: 3    oxybutynin (DITROPAN) 5 MG Tab, Take 1 tablet (5 mg total) by mouth 3 (three) times daily as needed (Bladder spasms)., Disp: 30 tablet, Rfl: 0    oxyCODONE-acetaminophen (PERCOCET) 5-325 mg per tablet, Take 1 tablet by mouth every 6 (six) hours as needed for Pain., Disp: 5 tablet, Rfl: 0    pen needle, diabetic (INSUPEN) 32 gauge x 1/4" Ndle, 1 each by Misc.(Non-Drug; Combo Route) route 4 (four) times daily., Disp: 360 each, Rfl: 3    polyethylene glycol (GLYCOLAX) 17 gram/dose powder, Mix one capfull with water and take by mouth once daily., Disp: 238 g, Rfl: 0    rosuvastatin (CRESTOR) 5 MG tablet, TAKE 1 TABLET BY MOUTH EVERY DAY, Disp: 90 tablet, Rfl: 3    sildenafiL (VIAGRA) 100 MG tablet, Take 1 tablet (100 mg total) by mouth daily as needed., Disp: 30 tablet, Rfl: 3    tamsulosin (FLOMAX) 0.4 mg Cap, Take 1 capsule (0.4 mg total) by mouth once daily., Disp: 30 capsule, Rfl: 1    blood-glucose sensor (DEXCOM G6 SENSOR) Sandi, 9 each by Misc.(Non-Drug; Combo Route) route continuous., Disp: 3 each, Rfl: PRN    blood-glucose transmitter (DEXCOM G6 TRANSMITTER) Sandi, 3 each by Misc.(Non-Drug; Combo Route) route continuous., " "Disp: 3 each, Rfl: PRN    bumetanide (BUMEX) 1 MG tablet, Take 1 tablet (1 mg total) by mouth before breakfast AND 0.5 tablets (0.5 mg total) every evening., Disp: 45 tablet, Rfl: 11    glucagon, human recombinant, (GLUCAGON EMERGENCY KIT, HUMAN,) 1 mg SolR, Inject 1 mg into the muscle as needed (For severely low sugar)., Disp: 1 each, Rfl: 2    insulin lispro (HUMALOG U-100 INSULIN) 100 unit/mL injection, TO USE WITH VGO 30 WITH 4 CLICKS WITH MEALS. TOTAL DAILY DOSE IS 70 UNITS - do not directly inject. Only use within vgo patch., Disp: 90 mL, Rfl: 3    mupirocin (BACTROBAN) 2 % ointment, Apply topically 3 (three) times daily., Disp: 30 g, Rfl: 0    V-GO 30 Sandi, 1 each by abdominal subcutaneous route once daily at 6am., Disp: 30 each, Rfl: 11     Current Diabetes medications:   VGO 30 patch - (was on a vgo 20) - giving around 4 clicks with meals.   humalog insulin.     Medications Tried and Failed:   metformin    Social:   Lives at home with: wife  Life changes/stressors currently. Did mention his mother in law passed away recently of stroke.   Diet: not following ADA diet   Meals: 3 per day and snacks.        Breakfast - waffle house special - eggs, biscuit, cheese, grits, hashbrowns.        Lunch - eats out/pastas/rice - red bean plates, etc.        Dinner - eats out every night - [employer]        Snacks 1 - 2 times per day.   Exercise: nothing formally - but works in yard/garage.    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     Glucose Monitoring:   Checking sugars 4x/day via Dexcom g6 - see scanned in .   Gets supplies via mail - has [employer].     Standards of care:   Eyes: .: 06/10/2021  Foot exam: : 09/21/2021   Diabetes education: yes in august with ariadna gill.     Vital Signs  BP (!) 150/80 (BP Location: Right arm, Patient Position: Sitting, BP Method: Large (Manual))   Pulse 75   Temp 97.9 °F (36.6 °C) (Temporal)   Resp 14   Ht 5' 7" (1.702 m)   Wt 125.2 kg (276 lb 0.3 " oz)   SpO2 100%   BMI 43.23 kg/m²     Pertinent Labs:   Hgba1c   Lab Results   Component Value Date    HGBA1C 8.1 (H) 03/15/2022    HGBA1C 8.6 (H) 12/16/2021    HGBA1C 8.3 (H) 09/20/2021     Lipid panel   Lab Results   Component Value Date    CHOL 155 03/15/2022    CHOL 137 12/16/2021    CHOL 138 03/26/2021     Lab Results   Component Value Date    HDL 65 03/15/2022    HDL 55 12/16/2021    HDL 56 03/26/2021     Lab Results   Component Value Date    LDLCALC 77.2 03/15/2022    LDLCALC 70.8 12/16/2021    LDLCALC 64.8 03/26/2021     Lab Results   Component Value Date    TRIG 64 03/15/2022    TRIG 56 12/16/2021    TRIG 86 03/26/2021     Lab Results   Component Value Date    CHOLHDL 41.9 03/15/2022    CHOLHDL 40.1 12/16/2021    CHOLHDL 40.6 03/26/2021      CMP  Glucose   Date Value Ref Range Status   03/15/2022 159 (H) 70 - 110 mg/dL Final     BUN   Date Value Ref Range Status   03/15/2022 30 (H) 8 - 23 mg/dL Final     Creatinine   Date Value Ref Range Status   03/15/2022 2.1 (H) 0.5 - 1.4 mg/dL Final     eGFR if    Date Value Ref Range Status   03/15/2022 36.6 (A) >60 mL/min/1.73 m^2 Final     eGFR if non    Date Value Ref Range Status   03/15/2022 31.6 (A) >60 mL/min/1.73 m^2 Final     Comment:     Calculation used to obtain the estimated glomerular filtration  rate (eGFR) is the CKD-EPI equation.        AST   Date Value Ref Range Status   03/15/2022 36 10 - 40 U/L Final     ALT   Date Value Ref Range Status   03/15/2022 37 10 - 44 U/L Final     Microalbumin creatinine ratio:   Lab Results   Component Value Date    MICALBCREAT 2502.2 (H) 03/15/2022       Review Of Systems:   Gen: Appetite good, no weight gain or loss, denies fatigue and weakness. Denies polydipsia.  Skin: Skin is intact and heals well, denies any rashes or hair changes.   EENT: Denies any acute visual disturbances, nor blurred vision.   Resp: Denies SOB or Dyspnea on exertion, denies cough.   Cardiac: Denies chest pain,  palpitations, + 2 edema to bilat. Lower ext.   GI: Denies abdominal pain, nausea or vomiting, diarrhea, or constipation.   /GYN: Denies nocturia, nor burning, frequency or pain on urination. + c/o ED still.   MS/Neuro: Denies numbness/ tingling in BLE; Gait steady, speech clear  Psych: Denies drug/ETOH abuse, no hx of depression.  Other systems: negative.    Physical Exam:   GENERAL: Well developed, well nourished in appearance.   PSYCH: AAOx3, appropriate mood and affect, pleasant expression, conversant, appears relaxed, well groomed.   EYES: PERRL, Conjunctiva and corneas clear  NECK: Soft and Supple, trachea midline  CHEST: Even, regular, and unlabored respirations  ABDOMEN: Soft, non-tender, non-distended.   VASCULAR: pedal pulses palpable bilaterally, no edema.  NEURO:  cranial nerves II - XII intact   MUSCULOSKELETAL: Good ROM, steady gait.   SKIN: Skin warm, dry, and intact - slight skin abrasion from area of vgo patch to area.     Assessment and Plan of Care:     Jian was seen today for diabetes and follow-up.    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  -     insulin lispro (HUMALOG U-100 INSULIN) 100 unit/mL injection; TO USE WITH VGO 30 WITH 4 CLICKS WITH MEALS. TOTAL DAILY DOSE IS 70 UNITS - do not directly inject. Only use within vgo patch.  -     Hemoglobin A1C; Future  -     Comprehensive Metabolic Panel; Future    Type 2 diabetes mellitus with stage 3 chronic kidney disease, with long-term current use of insulin, unspecified whether stage 3a or 3b CKD    Type 2 diabetes mellitus with other specified complication, with long-term current use of insulin    Hyperlipidemia associated with type 2 diabetes mellitus    Hypertension associated with diabetes    Paroxysmal atrial fibrillation    Status post bariatric surgery    Stage 3 chronic kidney disease,  unspecified whether stage 3a or 3b CKD    Hx of CABG    Coronary artery disease due to calcified coronary lesion    Other orders  -     mupirocin (BACTROBAN) 2 % ointment; Apply topically 3 (three) times daily.       1. T1 - ELOISE - formerly treated as T2DM with hyperglycemia- Hgba1c goal is 7.5% or less without hypoglycemia - 7.6%---> 8.6%--> 8.3%--> 8.6% --> 8.1%  current.   discussed DM, progression of disease, long term complications, CV risk factors and tx options.   Advise compliance with ADA diet and encourage exercise-   Continue on VGO 30   discussed option of changing to an omnipod - it may be better/more customized for him.  He has had pump eval in past and is willing to check on pricing/try out.   Will send in rx to check on price estimate.   Continue on 4 clicks per meals -- increase to 5 clicks - 6 clicks if glucose > 180 -   Remember to click before the meal, not after. To help control highs.   Reviewed  hypoglycemia, s/s and appropriate tx. Have/get quick acting glucose tablets at hand.   Instructed to monitor Blood glucose 2 - 4x/day and bring meter/ log to every clinic visit.   Continue on dexcom G6 personal cgm.     2. HTN & Heart failure - usually controlled, did not take meds yet today -    continue meds as previously prescribed and monitor.   + urine mac    3. Lipids- LDL goal < 100. At goal.   Currently on statin therapy    4. Weight - BMI Body mass index is 43.23 kg/m².   Encourage Ada diet and exercise as tolerated.   Former weight loss surgery.     5. Renal Function - ckd - stage 3 -   + microalbuminuria. No changes.            Check on cost/assess for changing to omnipod -   Follow up in 3 months with OV and labs prior

## 2022-03-30 DIAGNOSIS — E13.9 LADA (LATENT AUTOIMMUNE DIABETES IN ADULTS), MANAGED AS TYPE 1: Primary | ICD-10-CM

## 2022-04-08 ENCOUNTER — PATIENT OUTREACH (OUTPATIENT)
Dept: ADMINISTRATIVE | Facility: HOSPITAL | Age: 67
End: 2022-04-08
Payer: MEDICARE

## 2022-04-14 ENCOUNTER — PATIENT OUTREACH (OUTPATIENT)
Dept: ADMINISTRATIVE | Facility: HOSPITAL | Age: 67
End: 2022-04-14
Payer: MEDICARE

## 2022-04-14 NOTE — LETTER
AUTHORIZATION FOR RELEASE OF   CONFIDENTIAL INFORMATION    Dear Dr. Mack,    We are seeing Jian Arrieta, date of birth 1955, in the clinic at [facility]. Leon Barajas DO is the patient's PCP. Jian Arrieta has an outstanding lab/procedure at the time we reviewed his chart. In order to help keep his health information updated, he has authorized us to request the following medical record(s):        (  )  MAMMOGRAM                                      (  )  COLONOSCOPY      (  )  PAP SMEAR                                          (  )  OUTSIDE LAB RESULTS     (  )  DEXA SCAN                                          ( x )  EYE EXAM            (  )  FOOT EXAM                                          (  )  ENTIRE RECORD     (  )  OUTSIDE IMMUNIZATIONS                 (  )  _______________         Please fax records to [facility], Brian M Helmstetter, DO, [phone]     If you have any questions, please contact KALPESH Nathan at [phone]        Patient Name: Jian Arrieta  : 1955  Patient Phone #: [phone]

## 2022-05-20 ENCOUNTER — PATIENT MESSAGE (OUTPATIENT)
Dept: DIABETES | Facility: CLINIC | Age: 67
End: 2022-05-20
Payer: MEDICARE

## 2022-06-06 ENCOUNTER — TELEPHONE (OUTPATIENT)
Dept: INTERNAL MEDICINE | Facility: CLINIC | Age: 67
End: 2022-06-06
Payer: MEDICARE

## 2022-06-06 DIAGNOSIS — E08.621 DIABETIC ULCER OF LEFT FOOT ASSOCIATED WITH DIABETES MELLITUS DUE TO UNDERLYING CONDITION, UNSPECIFIED PART OF FOOT, UNSPECIFIED ULCER STAGE: Primary | ICD-10-CM

## 2022-06-06 DIAGNOSIS — L97.529 DIABETIC ULCER OF LEFT FOOT ASSOCIATED WITH DIABETES MELLITUS DUE TO UNDERLYING CONDITION, UNSPECIFIED PART OF FOOT, UNSPECIFIED ULCER STAGE: Primary | ICD-10-CM

## 2022-06-08 ENCOUNTER — OFFICE VISIT (OUTPATIENT)
Dept: INTERNAL MEDICINE | Facility: CLINIC | Age: 67
End: 2022-06-08
Payer: MEDICARE

## 2022-06-08 VITALS
WEIGHT: 245.13 LBS | SYSTOLIC BLOOD PRESSURE: 132 MMHG | DIASTOLIC BLOOD PRESSURE: 80 MMHG | RESPIRATION RATE: 18 BRPM | TEMPERATURE: 98 F | OXYGEN SATURATION: 99 % | BODY MASS INDEX: 38.47 KG/M2 | HEIGHT: 67 IN | HEART RATE: 91 BPM

## 2022-06-08 DIAGNOSIS — E11.40 TYPE 2 DIABETES MELLITUS WITH DIABETIC NEUROPATHY, WITH LONG-TERM CURRENT USE OF INSULIN: Chronic | ICD-10-CM

## 2022-06-08 DIAGNOSIS — I25.10 CORONARY ARTERY DISEASE DUE TO CALCIFIED CORONARY LESION: Chronic | ICD-10-CM

## 2022-06-08 DIAGNOSIS — Z95.1 HX OF CABG: ICD-10-CM

## 2022-06-08 DIAGNOSIS — I25.84 CORONARY ARTERY DISEASE DUE TO CALCIFIED CORONARY LESION: Chronic | ICD-10-CM

## 2022-06-08 DIAGNOSIS — K74.00 HEPATIC FIBROSIS: ICD-10-CM

## 2022-06-08 DIAGNOSIS — K76.6 PORTAL HYPERTENSION DUE TO OBSTRUCTION OF EXTRAHEPATIC PORTAL VEIN: Chronic | ICD-10-CM

## 2022-06-08 DIAGNOSIS — E11.69 HYPERLIPIDEMIA ASSOCIATED WITH TYPE 2 DIABETES MELLITUS: Chronic | ICD-10-CM

## 2022-06-08 DIAGNOSIS — I50.42 CHRONIC COMBINED SYSTOLIC AND DIASTOLIC HEART FAILURE: ICD-10-CM

## 2022-06-08 DIAGNOSIS — L97.521 DIABETIC ULCER OF TOE OF LEFT FOOT ASSOCIATED WITH TYPE 2 DIABETES MELLITUS, LIMITED TO BREAKDOWN OF SKIN: ICD-10-CM

## 2022-06-08 DIAGNOSIS — I15.2 HYPERTENSION ASSOCIATED WITH DIABETES: Chronic | ICD-10-CM

## 2022-06-08 DIAGNOSIS — I48.0 PAROXYSMAL ATRIAL FIBRILLATION: ICD-10-CM

## 2022-06-08 DIAGNOSIS — E66.01 MORBID OBESITY: ICD-10-CM

## 2022-06-08 DIAGNOSIS — E11.42 DIABETIC POLYNEUROPATHY ASSOCIATED WITH TYPE 2 DIABETES MELLITUS: ICD-10-CM

## 2022-06-08 DIAGNOSIS — L02.416 ABSCESS OF LEG, LEFT: Primary | ICD-10-CM

## 2022-06-08 DIAGNOSIS — N18.32 TYPE 2 DIABETES MELLITUS WITH STAGE 3B CHRONIC KIDNEY DISEASE, WITH LONG-TERM CURRENT USE OF INSULIN: ICD-10-CM

## 2022-06-08 DIAGNOSIS — I81 PORTAL HYPERTENSION DUE TO OBSTRUCTION OF EXTRAHEPATIC PORTAL VEIN: Chronic | ICD-10-CM

## 2022-06-08 DIAGNOSIS — E11.59 HYPERTENSION ASSOCIATED WITH DIABETES: Chronic | ICD-10-CM

## 2022-06-08 DIAGNOSIS — Z79.4 TYPE 2 DIABETES MELLITUS WITH DIABETIC NEUROPATHY, WITH LONG-TERM CURRENT USE OF INSULIN: Chronic | ICD-10-CM

## 2022-06-08 DIAGNOSIS — E11.621 DIABETIC ULCER OF TOE OF LEFT FOOT ASSOCIATED WITH TYPE 2 DIABETES MELLITUS, LIMITED TO BREAKDOWN OF SKIN: ICD-10-CM

## 2022-06-08 DIAGNOSIS — K86.1 OTHER CHRONIC PANCREATITIS: ICD-10-CM

## 2022-06-08 DIAGNOSIS — K74.69 OTHER CIRRHOSIS OF LIVER: ICD-10-CM

## 2022-06-08 DIAGNOSIS — E11.22 TYPE 2 DIABETES MELLITUS WITH STAGE 3B CHRONIC KIDNEY DISEASE, WITH LONG-TERM CURRENT USE OF INSULIN: ICD-10-CM

## 2022-06-08 DIAGNOSIS — E78.5 HYPERLIPIDEMIA ASSOCIATED WITH TYPE 2 DIABETES MELLITUS: Chronic | ICD-10-CM

## 2022-06-08 DIAGNOSIS — K86.3 PSEUDOCYST OF PANCREAS: ICD-10-CM

## 2022-06-08 DIAGNOSIS — I70.0 AORTIC ATHEROSCLEROSIS: ICD-10-CM

## 2022-06-08 DIAGNOSIS — I87.2 VENOUS INSUFFICIENCY OF BOTH LOWER EXTREMITIES: ICD-10-CM

## 2022-06-08 DIAGNOSIS — Z79.4 TYPE 2 DIABETES MELLITUS WITH STAGE 3B CHRONIC KIDNEY DISEASE, WITH LONG-TERM CURRENT USE OF INSULIN: ICD-10-CM

## 2022-06-08 PROCEDURE — 99999 PR PBB SHADOW E&M-EST. PATIENT-LVL IV: CPT | Mod: PBBFAC,,, | Performed by: INTERNAL MEDICINE

## 2022-06-08 PROCEDURE — 99214 PR OFFICE/OUTPT VISIT, EST, LEVL IV, 30-39 MIN: ICD-10-PCS | Mod: S$GLB,,, | Performed by: INTERNAL MEDICINE

## 2022-06-08 PROCEDURE — 3052F HG A1C>EQUAL 8.0%<EQUAL 9.0%: CPT | Mod: CPTII,S$GLB,, | Performed by: INTERNAL MEDICINE

## 2022-06-08 PROCEDURE — 3075F PR MOST RECENT SYSTOLIC BLOOD PRESS GE 130-139MM HG: ICD-10-PCS | Mod: CPTII,S$GLB,, | Performed by: INTERNAL MEDICINE

## 2022-06-08 PROCEDURE — 1159F MED LIST DOCD IN RCRD: CPT | Mod: CPTII,S$GLB,, | Performed by: INTERNAL MEDICINE

## 2022-06-08 PROCEDURE — 1101F PT FALLS ASSESS-DOCD LE1/YR: CPT | Mod: CPTII,S$GLB,, | Performed by: INTERNAL MEDICINE

## 2022-06-08 PROCEDURE — 3046F PR MOST RECENT HEMOGLOBIN A1C LEVEL > 9.0%: ICD-10-PCS | Mod: CPTII,S$GLB,, | Performed by: INTERNAL MEDICINE

## 2022-06-08 PROCEDURE — 3288F FALL RISK ASSESSMENT DOCD: CPT | Mod: CPTII,S$GLB,, | Performed by: INTERNAL MEDICINE

## 2022-06-08 PROCEDURE — 3052F PR MOST RECENT HEMOGLOBIN A1C LEVEL 8.0 - < 9.0%: ICD-10-PCS | Mod: CPTII,S$GLB,, | Performed by: INTERNAL MEDICINE

## 2022-06-08 PROCEDURE — 1125F PR PAIN SEVERITY QUANTIFIED, PAIN PRESENT: ICD-10-PCS | Mod: CPTII,S$GLB,, | Performed by: INTERNAL MEDICINE

## 2022-06-08 PROCEDURE — 1160F RVW MEDS BY RX/DR IN RCRD: CPT | Mod: CPTII,S$GLB,, | Performed by: INTERNAL MEDICINE

## 2022-06-08 PROCEDURE — 99499 RISK ADDL DX/OHS AUDIT: ICD-10-PCS | Mod: S$PBB,,, | Performed by: INTERNAL MEDICINE

## 2022-06-08 PROCEDURE — 3008F PR BODY MASS INDEX (BMI) DOCUMENTED: ICD-10-PCS | Mod: CPTII,S$GLB,, | Performed by: INTERNAL MEDICINE

## 2022-06-08 PROCEDURE — 1159F PR MEDICATION LIST DOCUMENTED IN MEDICAL RECORD: ICD-10-PCS | Mod: CPTII,S$GLB,, | Performed by: INTERNAL MEDICINE

## 2022-06-08 PROCEDURE — 3079F PR MOST RECENT DIASTOLIC BLOOD PRESSURE 80-89 MM HG: ICD-10-PCS | Mod: CPTII,S$GLB,, | Performed by: INTERNAL MEDICINE

## 2022-06-08 PROCEDURE — 3062F POS MACROALBUMINURIA REV: CPT | Mod: CPTII,S$GLB,, | Performed by: INTERNAL MEDICINE

## 2022-06-08 PROCEDURE — 1101F PR PT FALLS ASSESS DOC 0-1 FALLS W/OUT INJ PAST YR: ICD-10-PCS | Mod: CPTII,S$GLB,, | Performed by: INTERNAL MEDICINE

## 2022-06-08 PROCEDURE — 99499 UNLISTED E&M SERVICE: CPT | Mod: S$PBB,,, | Performed by: INTERNAL MEDICINE

## 2022-06-08 PROCEDURE — 3008F BODY MASS INDEX DOCD: CPT | Mod: CPTII,S$GLB,, | Performed by: INTERNAL MEDICINE

## 2022-06-08 PROCEDURE — 3075F SYST BP GE 130 - 139MM HG: CPT | Mod: CPTII,S$GLB,, | Performed by: INTERNAL MEDICINE

## 2022-06-08 PROCEDURE — 3046F HEMOGLOBIN A1C LEVEL >9.0%: CPT | Mod: CPTII,S$GLB,, | Performed by: INTERNAL MEDICINE

## 2022-06-08 PROCEDURE — 3079F DIAST BP 80-89 MM HG: CPT | Mod: CPTII,S$GLB,, | Performed by: INTERNAL MEDICINE

## 2022-06-08 PROCEDURE — 99999 PR PBB SHADOW E&M-EST. PATIENT-LVL IV: ICD-10-PCS | Mod: PBBFAC,,, | Performed by: INTERNAL MEDICINE

## 2022-06-08 PROCEDURE — 3066F PR DOCUMENTATION OF TREATMENT FOR NEPHROPATHY: ICD-10-PCS | Mod: CPTII,S$GLB,, | Performed by: INTERNAL MEDICINE

## 2022-06-08 PROCEDURE — 3062F PR POS MACROALBUMINURIA RESULT DOCUMENTED/REVIEW: ICD-10-PCS | Mod: CPTII,S$GLB,, | Performed by: INTERNAL MEDICINE

## 2022-06-08 PROCEDURE — 3066F NEPHROPATHY DOC TX: CPT | Mod: CPTII,S$GLB,, | Performed by: INTERNAL MEDICINE

## 2022-06-08 PROCEDURE — 1125F AMNT PAIN NOTED PAIN PRSNT: CPT | Mod: CPTII,S$GLB,, | Performed by: INTERNAL MEDICINE

## 2022-06-08 PROCEDURE — 99214 OFFICE O/P EST MOD 30 MIN: CPT | Mod: S$GLB,,, | Performed by: INTERNAL MEDICINE

## 2022-06-08 PROCEDURE — 3288F PR FALLS RISK ASSESSMENT DOCUMENTED: ICD-10-PCS | Mod: CPTII,S$GLB,, | Performed by: INTERNAL MEDICINE

## 2022-06-08 PROCEDURE — 1160F PR REVIEW ALL MEDS BY PRESCRIBER/CLIN PHARMACIST DOCUMENTED: ICD-10-PCS | Mod: CPTII,S$GLB,, | Performed by: INTERNAL MEDICINE

## 2022-06-08 RX ORDER — AMOXICILLIN AND CLAVULANATE POTASSIUM 875; 125 MG/1; MG/1
1 TABLET, FILM COATED ORAL 2 TIMES DAILY
COMMUNITY
Start: 2022-06-06 | End: 2022-07-29 | Stop reason: ALTCHOICE

## 2022-06-08 NOTE — PROGRESS NOTES
Subjective:       Patient ID: Jian Arrieta is a 66 y.o. male.    Chief Complaint: Leg Swelling (Lt Leg &Foot  swelling /), Discuss Dexcom  (Pt says Dexcom not working on his phone.), Discuss Kidney stones, and Urgency to Urine  (Pt says the urgency to urine & sometimes urine on himself.)    HPI   Pt with T2DM with neuropathy, CAD/PAF, HTN, HLD, CKD III, Atherosclerosis, Anemia, Morbid Obesity, Chronic LE lymphedema, BERHANE, Ascites/portal HTN, Chronic pancreatitis is here for f/u. Pt has an open wound of his left lateral LE for which he saw dermatology on Monday. He had an I and D done and was then started on Augmentin. Pt also with open wound to his left foot under 1st MTP joint which seemed to start after a pedicure a few weeks ago. No fevers/chills.   Review of Systems   Constitutional: Negative for activity change, appetite change, chills, diaphoresis, fatigue, fever and unexpected weight change.   HENT: Negative for nasal congestion, mouth sores, postnasal drip, rhinorrhea, sinus pressure/congestion, sneezing, sore throat, trouble swallowing and voice change.    Eyes: Negative for discharge, itching and visual disturbance.   Respiratory: Negative for cough, chest tightness, shortness of breath and wheezing.    Cardiovascular: Negative for chest pain, palpitations and leg swelling.   Gastrointestinal: Negative for abdominal pain, blood in stool, constipation, diarrhea, nausea and vomiting.   Endocrine: Negative for cold intolerance and heat intolerance.   Genitourinary: Negative for difficulty urinating, dysuria, flank pain, hematuria and urgency.   Musculoskeletal: Negative for arthralgias, back pain, myalgias and neck pain.   Integumentary:  Positive for wound (left leg and foot). Negative for rash.   Allergic/Immunologic: Negative for environmental allergies and food allergies.   Neurological: Negative for dizziness, tremors, seizures, syncope, weakness and headaches.   Hematological: Negative for adenopathy.  Does not bruise/bleed easily.   Psychiatric/Behavioral: Negative for confusion, sleep disturbance and suicidal ideas. The patient is not nervous/anxious.          Objective:      Physical Exam  Constitutional:       General: He is not in acute distress.     Appearance: He is well-developed. He is not diaphoretic.   HENT:      Head: Normocephalic and atraumatic.      Right Ear: External ear normal.      Left Ear: External ear normal.      Nose: Nose normal.      Mouth/Throat:      Pharynx: No oropharyngeal exudate.   Eyes:      General: No scleral icterus.        Right eye: No discharge.         Left eye: No discharge.      Conjunctiva/sclera: Conjunctivae normal.      Pupils: Pupils are equal, round, and reactive to light.   Neck:      Vascular: No JVD.   Cardiovascular:      Rate and Rhythm: Normal rate and regular rhythm.      Heart sounds: Normal heart sounds. No murmur heard.  Pulmonary:      Effort: Pulmonary effort is normal. No respiratory distress.      Breath sounds: Normal breath sounds. No wheezing or rales.   Musculoskeletal:      Cervical back: Normal range of motion and neck supple.        Feet:    Lymphadenopathy:      Cervical: No cervical adenopathy.   Skin:     General: Skin is warm and dry.      Coloration: Skin is not pale.      Findings: No rash.          Neurological:      Mental Status: He is alert and oriented to person, place, and time.         Assessment:       Problem List Items Addressed This Visit        Neuro    Diabetic polyneuropathy associated with type 2 diabetes mellitus       Cardiac/Vascular    Hypertension associated with diabetes (Chronic)    Hyperlipidemia associated with type 2 diabetes mellitus (Chronic)    Coronary artery disease due to calcified coronary lesion (Chronic)    Venous insufficiency of both lower extremities    Paroxysmal atrial fibrillation    Hx of CABG    Chronic combined systolic and diastolic heart failure    Aortic atherosclerosis       Endocrine    Type  2 diabetes mellitus with diabetic neuropathy, with long-term current use of insulin (Chronic)    Type 2 diabetes mellitus with stage 3 chronic kidney disease, with long-term current use of insulin    Morbid obesity    Diabetes mellitus due to underlying condition, uncontrolled, with renal complication       GI    Portal hypertension due to obstruction of extrahepatic portal vein (Chronic)    Pseudocyst of pancreas    Other cirrhosis of liver    Other chronic pancreatitis    Hepatic fibrosis      Other Visit Diagnoses     Abscess of leg, left    -  Primary    Diabetic ulcer of toe of left foot associated with type 2 diabetes mellitus, limited to breakdown of skin              Plan:    Referral to wound care for open wound of his left lower leg and foot ulcer- has appt on Friday   -on Augmentin per Derm     Ascites/portal HTN/Liver fibrosis- significant improvement s/p venoplasty for portal vein occlusion        Followed by Hepatology       T2DM with neuropathy/CKD- last A1C of 8.1(3/22)<--7.1(5/19)<--7.4(3/19)<--6.9(1/19)       -followed by Endo      CHF/PAF- stable, CPT      CAD with hx of CABG- stable, CPT      HTN- stable, CPT      HLD- stable      CKD III- stable      Morbid Obesity- pt advised on proper diet/exercise for weight loss      Chronic LE lymphedema with stasis dermatitis- stable      NC/NC Anemia- stable      BERHANE- pt not using his CPAP      Aortic atherosclerosis- stable      Chronic pancreatitis- stable      Medical noncompliance

## 2022-06-10 ENCOUNTER — HOSPITAL ENCOUNTER (OUTPATIENT)
Dept: WOUND CARE | Facility: HOSPITAL | Age: 67
Discharge: HOME | End: 2022-06-10
Attending: STUDENT IN AN ORGANIZED HEALTH CARE EDUCATION/TRAINING PROGRAM
Payer: MEDICARE

## 2022-06-10 VITALS
HEART RATE: 85 BPM | BODY MASS INDEX: 38.61 KG/M2 | SYSTOLIC BLOOD PRESSURE: 111 MMHG | TEMPERATURE: 97 F | HEIGHT: 67 IN | DIASTOLIC BLOOD PRESSURE: 59 MMHG | WEIGHT: 246 LBS

## 2022-06-10 DIAGNOSIS — L97.529 DIABETIC ULCER OF LEFT FOOT ASSOCIATED WITH DIABETES MELLITUS DUE TO UNDERLYING CONDITION, UNSPECIFIED PART OF FOOT, UNSPECIFIED ULCER STAGE: ICD-10-CM

## 2022-06-10 DIAGNOSIS — I89.0 LYMPHEDEMA OF BOTH LOWER EXTREMITIES: Primary | ICD-10-CM

## 2022-06-10 DIAGNOSIS — E08.621 DIABETIC ULCER OF LEFT FOOT ASSOCIATED WITH DIABETES MELLITUS DUE TO UNDERLYING CONDITION, UNSPECIFIED PART OF FOOT, UNSPECIFIED ULCER STAGE: ICD-10-CM

## 2022-06-10 DIAGNOSIS — B35.1 ONYCHOMYCOSIS: ICD-10-CM

## 2022-06-10 PROCEDURE — 11721 ROUTINE FOOT CARE: ICD-10-PCS | Mod: Q8,,, | Performed by: STUDENT IN AN ORGANIZED HEALTH CARE EDUCATION/TRAINING PROGRAM

## 2022-06-10 PROCEDURE — 29581 APPL MULTLAYER CMPRN SYS LEG: CPT

## 2022-06-10 PROCEDURE — 99203 OFFICE O/P NEW LOW 30 MIN: CPT | Mod: 25,,, | Performed by: STUDENT IN AN ORGANIZED HEALTH CARE EDUCATION/TRAINING PROGRAM

## 2022-06-10 PROCEDURE — 99203 PR OFFICE/OUTPT VISIT, NEW, LEVL III, 30-44 MIN: ICD-10-PCS | Mod: 25,,, | Performed by: STUDENT IN AN ORGANIZED HEALTH CARE EDUCATION/TRAINING PROGRAM

## 2022-06-10 PROCEDURE — 11719 TRIM NAIL(S) ANY NUMBER: CPT

## 2022-06-10 PROCEDURE — 11721 DEBRIDE NAIL 6 OR MORE: CPT

## 2022-06-10 PROCEDURE — 99204 OFFICE O/P NEW MOD 45 MIN: CPT

## 2022-06-10 PROCEDURE — 11721 DEBRIDE NAIL 6 OR MORE: CPT | Mod: Q8,,, | Performed by: STUDENT IN AN ORGANIZED HEALTH CARE EDUCATION/TRAINING PROGRAM

## 2022-06-10 NOTE — PROGRESS NOTES
"   Subjective:       Patient ID: Jian Arrieta is a 66 y.o. male.    Chief Complaint: Diabetic Foot Ulcer and Non-healing Wound    6/10/22: Readmit to wound care with left plantar foot ulcer and abscess to left leg.  Hx DM, HTN, Lymphedema with no compression stocking use. Reports getting a pedicure 6/1/22 and states "the tech was too rough with the cheese grater and made the whole on my foot."  Also states at same time developed an abscess to left leg. Seen dermatogy on 6/6/22. Wound I&D done in office and patient placed on Augmentin PO. Redness appears to be improving. No wound culture or debridements done today in clinic. Dr. Ravi also seen patient assessd pulses, ordered BLE venous ultrasounds. Toenails x 10 trimmed. Applied Tubi  F toe to knee RLE. Hydrofera Ready to both LLE  Wounds, football dressing to foot and profore lite toe to knee. Nurse visits until seen by MD. Educated patient to continue Augmentin Po as ordered, verbalized understanding.  RTC 2 weeks with .     PCP: Dr. Barajas  Date Last Seen: Seen by Dr. Ravi 6/10/22  Hemoglobin A1C   Date Value Ref Range Status   03/15/2022 8.1 (H) 4.0 - 5.6 % Final     Comment:     ADA Screening Guidelines:  5.7-6.4%  Consistent with prediabetes  >or=6.5%  Consistent with diabetes    High levels of fetal hemoglobin interfere with the HbA1C  assay. Heterozygous hemoglobin variants (HbS, HgC, etc)do  not significantly interfere with this assay.   However, presence of multiple variants may affect accuracy.     12/16/2021 8.6 (H) 4.0 - 5.6 % Final     Comment:     ADA Screening Guidelines:  5.7-6.4%  Consistent with prediabetes  >or=6.5%  Consistent with diabetes    High levels of fetal hemoglobin interfere with the HbA1C  assay. Heterozygous hemoglobin variants (HbS, HgC, etc)do  not significantly interfere with this assay.   However, presence of multiple variants may affect accuracy.     09/20/2021 8.3 (H) 4.0 - 5.6 % Final     Comment:     " ADA Screening Guidelines:  5.7-6.4%  Consistent with prediabetes  >or=6.5%  Consistent with diabetes    High levels of fetal hemoglobin interfere with the HbA1C  assay. Heterozygous hemoglobin variants (HbS, HgC, etc)do  not significantly interfere with this assay.   However, presence of multiple variants may affect accuracy.         Review of Systems   Constitutional: Negative for activity change, chills, diaphoresis and fever.   HENT: Negative for congestion and hearing loss.    Respiratory: Negative for cough and shortness of breath.    Cardiovascular: Positive for leg swelling.   Gastrointestinal: Negative for nausea and vomiting.   Skin: Positive for wound. Negative for color change, pallor and rash.   Neurological: Positive for numbness. Negative for tremors, speech difficulty and weakness.   Psychiatric/Behavioral: Negative for agitation and confusion. The patient is not nervous/anxious.          Objective:        Physical Exam  Constitutional:       General: He is not in acute distress.     Appearance: Normal appearance. He is well-developed. He is not diaphoretic.   Cardiovascular:      Comments: Dorsalis pedis and posterior tibial pulses are mildly diminished. Skin temperature is within normal limits. Toes are cool to touch and feet are warm proximally. Hair growth is diminished. Skin is atrophic and with mild hyperpigmentation. Pitting edema noted, bilaterally.   Musculoskeletal:         General: No tenderness.      Comments: Adequate joint range of motion without pain, limitation, nor crepitation to foot and ankle joints. Muscle strength is 5/5 in all groups bilaterally.       Feet:      Right foot:      Skin integrity: Dry skin present. No ulcer, blister, erythema or warmth.      Left foot:      Skin integrity: Dry skin present. No ulcer, blister, erythema or warmth.   Skin:     General: Skin is warm and dry.      Capillary Refill: Capillary refill takes less than 2 seconds.      Comments: Skin is warm  and dry, no acute signs of infection noted bilaterally      See wound description below    Toenails are thickened by 2-4 mm's, dystrophic, and are darkened in coloration with subungual fungal debris.     Otherwise, no open wounds, macerations or hyperkeratotic lesions, bilaterally            Neurological:      Mental Status: He is alert and oriented to person, place, and time.      Sensory: Sensory deficit present.      Motor: No abnormal muscle tone.      Comments: Light touch within normal limits.    Psychiatric:         Mood and Affect: Mood normal.         Behavior: Behavior normal.         Thought Content: Thought content normal.            Altered Skin Integrity 06/10/22 0900 Left plantar Foot #1 Traumatic (Active)   06/10/22 0900   Altered Skin Integrity Present on Admission:    Side: Left   Orientation: plantar   Location: Foot   Wound Number: #1   Is this injury device related?:    Primary Wound Type: Traumatic   Description of Altered Skin Integrity:    Removal Indication and Assessment:    Wound Outcome:    (Retired) Wound Length (cm):    (Retired) Wound Width (cm):    (Retired) Depth (cm):    Wound Description (Comments):    Removal Indications:    Wound Image   06/10/22 0900   Description of Altered Skin Integrity Full thickness tissue loss. Subcutaneous fat may be visible but bone, tendon or muscle are not exposed 06/10/22 0900   Dressing Appearance Intact;Moist drainage 06/10/22 0900   Drainage Amount Moderate 06/10/22 0900   Drainage Characteristics/Odor Serosanguineous 06/10/22 0900   Appearance Pink;Moist 06/10/22 0900   Tissue loss description Full thickness 06/10/22 0900   Red (%), Wound Tissue Color 100 % 06/10/22 0900   Periwound Area Edematous;Intact;Macerated 06/10/22 0900   Wound Edges Defined 06/10/22 0900   Hendricks Classification (diabetic foot ulcers only) Grade 1 06/10/22 0900   Wound Length (cm) 0.6 cm 06/10/22 0900   Wound Width (cm) 0.4 cm 06/10/22 0900   Wound Depth (cm) 0.2 cm  06/10/22 0900   Wound Volume (cm^3) 0.048 cm^3 06/10/22 0900   Wound Surface Area (cm^2) 0.24 cm^2 06/10/22 0900   Care Cleansed with:;Sterile normal saline 06/10/22 0900   Dressing Applied;Hydrofiber;Foam;Cast padding;Compression wrap 06/10/22 0900   Periwound Care Absorptive dressing applied;Skin barrier film applied 06/10/22 0900   Compression Two layer compression 06/10/22 0900   Off Loading Football dressing;Off loading shoe 06/10/22 0900   Dressing Change Due 06/17/22 06/10/22 0900            Altered Skin Integrity 06/10/22 0900 Right anterior;lower Leg Other (comment) (Active)   06/10/22 0900   Altered Skin Integrity Present on Admission:    Side: Right   Orientation: anterior;lower   Location: Leg   Wound Number:    Is this injury device related?:    Primary Wound Type: Other   Description of Altered Skin Integrity:    Removal Indication and Assessment:    Wound Outcome:    (Retired) Wound Length (cm):    (Retired) Wound Width (cm):    (Retired) Depth (cm):    Wound Description (Comments):    Removal Indications:    Description of Altered Skin Integrity Intact skin with non-blanchable redness of localized area 06/10/22 0900   Periwound Area Edematous 06/10/22 0900   Wound Edges Rolled/closed 06/10/22 0900   Wound Length (cm) 0 cm 06/10/22 0900   Wound Width (cm) 0 cm 06/10/22 0900   Wound Depth (cm) 0 cm 06/10/22 0900   Wound Volume (cm^3) 0 cm^3 06/10/22 0900   Wound Surface Area (cm^2) 0 cm^2 06/10/22 0900   Care Cleansed with:;Soap and water 06/10/22 0900   Dressing Applied;Tubular bandage 06/10/22 0900   Periwound Care Dry periwound area maintained 06/10/22 0900   Compression Tubular elasticized bandage 06/10/22 0900   Dressing Change Due 06/17/22 06/10/22 0900            Wound 06/10/22 0900 Abscess Left anterior;lower Leg (Active)   06/10/22 0900    Pre-existing: Yes   Primary Wound Type: Abscess   Side: Left   Orientation: anterior;lower   Location: Leg   Wound Number:    Ankle-Brachial Index:     Pulses:    Removal Indication and Assessment:    Wound Outcome:    (Retired) Wound Type:    (Retired) Wound Length (cm):    (Retired) Wound Width (cm):    (Retired) Depth (cm):    Wound Description (Comments):    Removal Indications:    Wound Image   06/10/22 0900   Dressing Appearance Intact;Moist drainage 06/10/22 0900   Drainage Amount Moderate 06/10/22 0900   Drainage Characteristics/Odor Bleeding controlled 06/10/22 0900   Appearance Red;Black;Bleeding;Moist 06/10/22 0900   Tissue loss description Full thickness 06/10/22 0900   Black (%), Wound Tissue Color 20 % 06/10/22 0900   Red (%), Wound Tissue Color 80 % 06/10/22 0900   Periwound Area Edematous;Intact;Redness 06/10/22 0900   Wound Edges Open 06/10/22 0900   Wound Length (cm) 2.6 cm 06/10/22 0900   Wound Width (cm) 1.5 cm 06/10/22 0900   Wound Depth (cm) 1 cm 06/10/22 0900   Wound Volume (cm^3) 3.9 cm^3 06/10/22 0900   Wound Surface Area (cm^2) 3.9 cm^2 06/10/22 0900   Care Cleansed with:;Sterile normal saline 06/10/22 0900   Dressing Applied;Hydrofiber;Absorptive Pad;Compression wrap 06/10/22 0900   Periwound Care Absorptive dressing applied;Dry periwound area maintained 06/10/22 0900   Compression Two layer compression 06/10/22 0900   Off Loading Football dressing 06/10/22 0900   Dressing Change Due 06/17/22 06/10/22 0900         Assessment:         ICD-10-CM ICD-9-CM   1. Lymphedema of both lower extremities  I89.0 457.1   2. Diabetic ulcer of left foot associated with diabetes mellitus due to underlying condition, unspecified part of foot, unspecified ulcer stage  E08.621 249.80    L97.529 707.15   3. Cellulitis and abscess of leg  L03.119 682.6    L02.419          Plan:   Tissue pathology and/or culture taken:  [] Yes [x] No   Sharp debridement performed:   [] Yes [x] No   Labs ordered this visit:   [] Yes [x] No   Imaging ordered this visit:   [x] Yes [] No           Orders Placed This Encounter   Procedures    Ambulatory referral/consult to Wound  Clinic     Standing Status:   Standing     Number of Occurrences:   1     Referral Priority:   Routine     Referral Type:   Consultation     Referral Reason:   Specialty Services Required     Requested Specialty:   Wound Care     Number of Visits Requested:   1    Change dressing     Left Foot and Leg  Cleanse wound with:Normal Saline  Lidocaine:PRN  Silver nitrate:PRN  Periwound care:Gentian Violet Prn maceration  Primary dressing:Hydrofera Ready to both wounds  Secondary dressing:Small abd, 2 violette foams to plantar wound.   Edema control: Elevate BLE as much as possible. Profore lite LLE toe to knee. Tubi  F RLE  Frequency: Twice weekly in clinic  Follow-up: Nurse visits until seen by MD. Castro 6/24/22.    Other Orders: Continue Augmentin PO as ordered.    BLE venous ultra sound ordered per  6/10/22.      Wound care per above  RFC per attached note  Rest, elevate  Follow up with Cardiology for venous stasis  Shoe inspection. Diabetic Foot Education. Patient reminded of the importance of good nutrition and blood sugar control to help prevent podiatric complications of diabetes. Patient instructed on proper foot hygeine. We discussed wearing proper shoe gear, daily foot inspections, never walking without protective shoe gear, never putting sharp instruments to feet, routine podiatric nail visits      Follow up in about 2 weeks (around 6/24/2022) for Dr. Felton.

## 2022-06-14 ENCOUNTER — OFFICE VISIT (OUTPATIENT)
Dept: CARDIOLOGY | Facility: CLINIC | Age: 67
End: 2022-06-14
Payer: MEDICARE

## 2022-06-14 ENCOUNTER — HOSPITAL ENCOUNTER (OUTPATIENT)
Dept: WOUND CARE | Facility: HOSPITAL | Age: 67
Discharge: HOME | End: 2022-06-14
Attending: STUDENT IN AN ORGANIZED HEALTH CARE EDUCATION/TRAINING PROGRAM
Payer: MEDICARE

## 2022-06-14 VITALS
DIASTOLIC BLOOD PRESSURE: 59 MMHG | WEIGHT: 246 LBS | SYSTOLIC BLOOD PRESSURE: 111 MMHG | HEART RATE: 85 BPM | BODY MASS INDEX: 38.61 KG/M2 | HEIGHT: 67 IN

## 2022-06-14 DIAGNOSIS — L02.419 CELLULITIS AND ABSCESS OF LEG: ICD-10-CM

## 2022-06-14 DIAGNOSIS — Z95.1 HX OF CABG: ICD-10-CM

## 2022-06-14 DIAGNOSIS — E78.5 HYPERLIPIDEMIA ASSOCIATED WITH TYPE 2 DIABETES MELLITUS: Chronic | ICD-10-CM

## 2022-06-14 DIAGNOSIS — I25.10 CORONARY ARTERY DISEASE DUE TO CALCIFIED CORONARY LESION: Chronic | ICD-10-CM

## 2022-06-14 DIAGNOSIS — L97.529 DIABETIC ULCER OF LEFT FOOT ASSOCIATED WITH DIABETES MELLITUS DUE TO UNDERLYING CONDITION, UNSPECIFIED PART OF FOOT, UNSPECIFIED ULCER STAGE: Primary | ICD-10-CM

## 2022-06-14 DIAGNOSIS — E11.69 HYPERLIPIDEMIA ASSOCIATED WITH TYPE 2 DIABETES MELLITUS: Chronic | ICD-10-CM

## 2022-06-14 DIAGNOSIS — I70.0 AORTIC ATHEROSCLEROSIS: ICD-10-CM

## 2022-06-14 DIAGNOSIS — E66.2 OBESITY HYPOVENTILATION SYNDROME: Chronic | ICD-10-CM

## 2022-06-14 DIAGNOSIS — L03.119 CELLULITIS AND ABSCESS OF LEG: ICD-10-CM

## 2022-06-14 DIAGNOSIS — I50.42 CHRONIC COMBINED SYSTOLIC AND DIASTOLIC HEART FAILURE: ICD-10-CM

## 2022-06-14 DIAGNOSIS — E11.59 HYPERTENSION ASSOCIATED WITH DIABETES: Chronic | ICD-10-CM

## 2022-06-14 DIAGNOSIS — I89.0 LYMPHEDEMA OF BOTH LOWER EXTREMITIES: ICD-10-CM

## 2022-06-14 DIAGNOSIS — E08.621 DIABETIC ULCER OF LEFT FOOT ASSOCIATED WITH DIABETES MELLITUS DUE TO UNDERLYING CONDITION, UNSPECIFIED PART OF FOOT, UNSPECIFIED ULCER STAGE: Primary | ICD-10-CM

## 2022-06-14 DIAGNOSIS — N20.1 OBSTRUCTION OF RIGHT URETEROPELVIC JUNCTION (UPJ) DUE TO STONE: ICD-10-CM

## 2022-06-14 DIAGNOSIS — G47.30 SLEEP APNEA, UNSPECIFIED TYPE: ICD-10-CM

## 2022-06-14 DIAGNOSIS — I87.2 VENOUS INSUFFICIENCY OF BOTH LOWER EXTREMITIES: Primary | ICD-10-CM

## 2022-06-14 DIAGNOSIS — I48.0 PAROXYSMAL ATRIAL FIBRILLATION: ICD-10-CM

## 2022-06-14 DIAGNOSIS — I25.84 CORONARY ARTERY DISEASE DUE TO CALCIFIED CORONARY LESION: Chronic | ICD-10-CM

## 2022-06-14 DIAGNOSIS — I15.2 HYPERTENSION ASSOCIATED WITH DIABETES: Chronic | ICD-10-CM

## 2022-06-14 DIAGNOSIS — I70.0 ATHEROSCLEROSIS OF AORTA: ICD-10-CM

## 2022-06-14 DIAGNOSIS — I89.0 LYMPHEDEMA OF BOTH LOWER EXTREMITIES: Chronic | ICD-10-CM

## 2022-06-14 PROCEDURE — 3008F PR BODY MASS INDEX (BMI) DOCUMENTED: ICD-10-PCS | Mod: CPTII,S$GLB,, | Performed by: INTERNAL MEDICINE

## 2022-06-14 PROCEDURE — 3074F SYST BP LT 130 MM HG: CPT | Mod: CPTII,S$GLB,, | Performed by: INTERNAL MEDICINE

## 2022-06-14 PROCEDURE — 3062F POS MACROALBUMINURIA REV: CPT | Mod: CPTII,S$GLB,, | Performed by: INTERNAL MEDICINE

## 2022-06-14 PROCEDURE — 3074F PR MOST RECENT SYSTOLIC BLOOD PRESSURE < 130 MM HG: ICD-10-PCS | Mod: CPTII,S$GLB,, | Performed by: INTERNAL MEDICINE

## 2022-06-14 PROCEDURE — 3052F HG A1C>EQUAL 8.0%<EQUAL 9.0%: CPT | Mod: CPTII,S$GLB,, | Performed by: INTERNAL MEDICINE

## 2022-06-14 PROCEDURE — 99205 OFFICE O/P NEW HI 60 MIN: CPT | Mod: S$GLB,,, | Performed by: INTERNAL MEDICINE

## 2022-06-14 PROCEDURE — 3078F DIAST BP <80 MM HG: CPT | Mod: CPTII,S$GLB,, | Performed by: INTERNAL MEDICINE

## 2022-06-14 PROCEDURE — 99999 PR PBB SHADOW E&M-EST. PATIENT-LVL II: CPT | Mod: PBBFAC,,, | Performed by: INTERNAL MEDICINE

## 2022-06-14 PROCEDURE — 3066F PR DOCUMENTATION OF TREATMENT FOR NEPHROPATHY: ICD-10-PCS | Mod: CPTII,S$GLB,, | Performed by: INTERNAL MEDICINE

## 2022-06-14 PROCEDURE — 3078F PR MOST RECENT DIASTOLIC BLOOD PRESSURE < 80 MM HG: ICD-10-PCS | Mod: CPTII,S$GLB,, | Performed by: INTERNAL MEDICINE

## 2022-06-14 PROCEDURE — 3052F PR MOST RECENT HEMOGLOBIN A1C LEVEL 8.0 - < 9.0%: ICD-10-PCS | Mod: CPTII,S$GLB,, | Performed by: INTERNAL MEDICINE

## 2022-06-14 PROCEDURE — 99999 PR PBB SHADOW E&M-EST. PATIENT-LVL II: ICD-10-PCS | Mod: PBBFAC,,, | Performed by: INTERNAL MEDICINE

## 2022-06-14 PROCEDURE — 3008F BODY MASS INDEX DOCD: CPT | Mod: CPTII,S$GLB,, | Performed by: INTERNAL MEDICINE

## 2022-06-14 PROCEDURE — 3066F NEPHROPATHY DOC TX: CPT | Mod: CPTII,S$GLB,, | Performed by: INTERNAL MEDICINE

## 2022-06-14 PROCEDURE — 29581 APPL MULTLAYER CMPRN SYS LEG: CPT

## 2022-06-14 PROCEDURE — 99205 PR OFFICE/OUTPT VISIT, NEW, LEVL V, 60-74 MIN: ICD-10-PCS | Mod: S$GLB,,, | Performed by: INTERNAL MEDICINE

## 2022-06-14 PROCEDURE — 3062F PR POS MACROALBUMINURIA RESULT DOCUMENTED/REVIEW: ICD-10-PCS | Mod: CPTII,S$GLB,, | Performed by: INTERNAL MEDICINE

## 2022-06-14 NOTE — PROGRESS NOTES
Subjective:   Patient ID:  Jian Arrieta is a 66 y.o. male who presents for  of Chronic Venous Insufficiency w/ Ulceration, Hypertension, Hyperlipidemia, and Obesity      HPI:       66 yr male presenting with bilateral edema concerning for venous insufficiency complicated with ulceration, edema, pain, hyperpigmentation, and venous claudication. No previous DVT. He has a history of CAD s/p PCI (x5) with subsequent CABG x3v (~2017 at ; LIMA-LAD [patent], SVG-OM [patent], SVG-RCA [occluded; L->R collaterals from LAD to PDA]), ICM / HFmrEF with LVEF 45%, HLD, DM2, BERHANE on CPAP, R knee OA, Charcot foot, normocytic anemia, recurrent ascites (CT with findings of cirrhosis but biopsy negative; has large pancreatic pseudocyst) due to portal vein thrombosis s/p angioplasty, aortic atherosclerosis (CT)      CEAP 6  VCSS 13         Patient Active Problem List    Diagnosis Date Noted    Venous insufficiency of both lower extremities 06/14/2022    History of colon polyps 01/13/2022    Other nonthrombocytopenic purpura 01/13/2022    Ureteral stone 06/30/2021    Hematoma of scrotum 06/21/2021    Kidney stone on right side 05/27/2021    Obstruction of right ureteropelvic junction (UPJ) due to stone 05/24/2021    Bilateral hydrocele 05/24/2021    Vitamin D deficiency 04/07/2021    Hx of CABG 03/31/2021    Morbid obesity 03/15/2021    Atherosclerosis of aorta 05/11/2020    Other cirrhosis of liver     Diabetes mellitus due to underlying condition, uncontrolled, with renal complication     Current use of long term anticoagulation 03/10/2020    Type 2 diabetes mellitus with stage 3 chronic kidney disease, with long-term current use of insulin 11/15/2019    Medically noncompliant 08/22/2019    Chronic kidney disease, stage 3 07/23/2019    Gastritis     Portal hypertension due to obstruction of extrahepatic portal vein 06/27/2019    Hepatic fibrosis 06/25/2019    Hypertension associated with diabetes 05/06/2019     Hyperlipidemia associated with type 2 diabetes mellitus 05/06/2019    Obesity hypoventilation syndrome 05/06/2019    Paroxysmal atrial fibrillation 03/16/2019    Anemia 03/15/2019    Other ascites 03/13/2019    Type 2 diabetes mellitus with diabetic neuropathy, with long-term current use of insulin 02/04/2019    Hypoalbuminemia 02/04/2019    Chronic combined systolic and diastolic heart failure 01/29/2019 1-28-19  Left Ventricle Moderate decreased ejection fraction at 30%. Normal left ventricular cavity size. Normal wall thickness observed. Grade I (mild) left ventricular diastolic dysfunction consistent with impaired relaxation. Normal left atrial pressure. Moderate, global hypokinesis(see wall scoring diagram).   Right Ventricle Normal cavity size, wall thickness and ejection fraction. Wall motion normal.   Left Atrium Normal atrial size. .   Right Atrium Normal atrial size.   Aortic Valve The valve is trileaflet. Aortic valve sclerosis is mild. No stenosis. No regurgitation. Mobility is normal   Mitral Valve Normal valve structure. No stenosis. No regurgitation. Normal leaflet mobility.   Tricuspid Valve Tricuspid valve not well visualized. Trace regurgitation.   Pulmonic Valve The pulmonic valve was not well visualized.   IVC/SVC Inferior vena cava is not well visualized.   Ascending Aorta The aortic root and ascending aorta are normal in size.   Pericardium No pericardial effusion.           Lymphedema of both lower extremities 01/29/2019    Other chronic pancreatitis 01/28/2019    Pseudocyst of pancreas 01/28/2019 1-28-19  Liver has a cirrhotic morphology with no focal lesions.  Significant interval increase in ascites when compared to prior exam which may account for patient's abdominal distension.    Hypodense air-fluid collection along the body of the pancreas which is slightly smaller when compared to prior CT.  Findings may relate to pancreatic necrosis with pancreatic pseudocysts with  infected pseudocyst not excluded.  Mass effect with narrowing of the portal confluence in the region although the vessels appear patent.  Diffuse anasarca  Sleeve gastrectomy.     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), managed as type 1 2015           Right Arm BP - Sittin/59  Left Arm BP - Sittin/59        LABS      LAST HbA1c  Lab Results   Component Value Date    HGBA1C 8.1 (H) 03/15/2022       Lipid panel  Lab Results   Component Value Date    CHOL 155 03/15/2022    CHOL 137 2021    CHOL 138 2021     Lab Results   Component Value Date    HDL 65 03/15/2022    HDL 55 2021    HDL 56 2021     Lab Results   Component Value Date    LDLCALC 77.2 03/15/2022    LDLCALC 70.8 2021    LDLCALC 64.8 2021     Lab Results   Component Value Date    TRIG 64 03/15/2022    TRIG 56 2021    TRIG 86 2021     Lab Results   Component Value Date    CHOLHDL 41.9 03/15/2022    CHOLHDL 40.1 2021    CHOLHDL 40.6 2021            ROS    Objective:   Physical Exam  Constitutional:       Appearance: He is well-developed.      Interventions: He is not intubated.  HENT:      Head: Normocephalic and atraumatic.      Right Ear: External ear normal. Decreased hearing noted.      Left Ear: External ear normal. Decreased hearing noted.   Eyes:      General: No scleral icterus.        Right eye: No discharge.         Left eye: No discharge.      Conjunctiva/sclera: Conjunctivae normal.      Pupils: Pupils are equal, round, and reactive to light.   Neck:      Thyroid: No thyromegaly.       Vascular: Normal carotid pulses. No carotid bruit, hepatojugular reflux or JVD.      Trachea: No tracheal deviation.   Cardiovascular:      Rate and Rhythm: Normal rate and regular rhythm.  No extrasystoles are present.     Chest Wall: PMI is not displaced.      Pulses:           Carotid pulses are 2+ on the right side and 2+ on the left side.       Radial pulses are 2+ on the right side and 2+ on the left side.        Femoral pulses are 2+ on the right side and 2+ on the left side.       Popliteal pulses are 2+ on the right side and 2+ on the left side.        Dorsalis pedis pulses are 0 on the right side and 0 on the left side.        Posterior tibial pulses are 0 on the right side and 0 on the left side.      Heart sounds: S1 normal and S2 normal. Heart sounds not distant. No midsystolic click. No murmur heard.    No friction rub. No gallop. No S3 sounds.      Comments:       Triphasic L DP and biphasic L PT doppler signals        Biphasic R DP and PT doppler signals         Pulmonary:      Effort: Pulmonary effort is normal. No tachypnea, bradypnea, accessory muscle usage or respiratory distress. He is not intubated.      Breath sounds: Normal breath sounds. No stridor. No decreased breath sounds, wheezing or rales.   Chest:      Chest wall: No tenderness.   Abdominal:      General: There is no distension or abdominal bruit.      Palpations: There is no mass or pulsatile mass.      Tenderness: There is no abdominal tenderness. There is no guarding or rebound.   Musculoskeletal:         General: No tenderness. Normal range of motion.      Cervical back: Normal range of motion and neck supple.   Lymphadenopathy:      Cervical: No cervical adenopathy.      Comments:     Edema of both ankles to knees 2 to 3 +           Skin:     General: Skin is warm.      Coloration: Skin is not pale.      Findings: No erythema or rash.      Comments:     L lateral shin wound       See images          Neurological:      Mental  Status: He is alert and oriented to person, place, and time.      Cranial Nerves: No cranial nerve deficit.      Coordination: Coordination normal.      Deep Tendon Reflexes: Reflexes are normal and symmetric.   Psychiatric:         Behavior: Behavior normal.         Thought Content: Thought content normal.         Judgment: Judgment normal.         6/2022                Assessment:     1. Venous insufficiency of both lower extremities    2. Coronary artery disease due to calcified coronary lesion    3. Hyperlipidemia associated with type 2 diabetes mellitus    4. Hypertension associated with diabetes    5. Aortic atherosclerosis    6. Chronic combined systolic and diastolic heart failure    7. Atherosclerosis of aorta    8. Paroxysmal atrial fibrillation    9. Hx of CABG    10. Obstruction of right ureteropelvic junction (UPJ) due to stone    11. Sleep apnea, unspecified type    12. Lymphedema of both lower extremities    13. Obesity hypoventilation syndrome        Plan:         Venous insufficiency ultrasound   Follow up on 6/28/2022  Compression stockings 20-30 mmHg  Low salt diet  Referral to lymphedema after wounds are healed             Continue with current medical plan and lifestyle changes.  Return sooner for concerns or questions. If symptoms persist go to the ED  I have reviewed all pertinent data on this patient       I have reviewed the patient's medical history in detail and updated the computerized patient record.    Orders Placed This Encounter   Procedures    US Lower Extremity Veins Bilateral Insuf     Standing Status:   Future     Standing Expiration Date:   6/14/2023     Order Specific Question:   May the Radiologist modify the order per protocol to meet the clinical needs of the patient?     Answer:   Yes       Follow up as scheduled. Return sooner for concerns or questions            He expressed verbal understanding and agreed with the plan        Patient's Medications   New Prescriptions     No medications on file   Previous Medications    AMOXICILLIN-CLAVULANATE 875-125MG (AUGMENTIN) 875-125 MG PER TABLET    Take 1 tablet by mouth 2 (two) times daily.    APIXABAN (ELIQUIS) 5 MG TAB    Take 1 tablet (5 mg total) by mouth 2 (two) times daily.    ASPIRIN (ECOTRIN) 81 MG EC TABLET    Take 1 tablet (81 mg total) by mouth once daily.    BLOOD-GLUCOSE SENSOR (DEXCOM G6 SENSOR) JUAN MANUEL    9 each by Misc.(Non-Drug; Combo Route) route continuous.    BLOOD-GLUCOSE TRANSMITTER (DEXCOM G6 TRANSMITTER) JUAN MANUEL    3 each by Misc.(Non-Drug; Combo Route) route continuous.    BUMETANIDE (BUMEX) 1 MG TABLET    Take 1 tablet (1 mg total) by mouth before breakfast AND 0.5 tablets (0.5 mg total) every evening.    GLUCAGON, HUMAN RECOMBINANT, (GLUCAGON EMERGENCY KIT, HUMAN,) 1 MG SOLR    Inject 1 mg into the muscle as needed (For severely low sugar).    ROSUVASTATIN (CRESTOR) 5 MG TABLET    TAKE 1 TABLET BY MOUTH EVERY DAY    TAMSULOSIN (FLOMAX) 0.4 MG CAP    Take 1 capsule (0.4 mg total) by mouth once daily.   Modified Medications    No medications on file   Discontinued Medications

## 2022-06-14 NOTE — PROGRESS NOTES
[facility]    Subjective:       Patient ID: Jian Arrieta is a 66 y.o. male.    Chief Complaint: Non-healing Wound Follow Up    6/14/22:  Nurse visit for wound assessment and dressing change.  No new complaints.  Keri seen in clinic today.  Venous US scheduled 6/28/22.  F/U with Dr. Saavedra on 6/28/22.  Care tolerated well.      Review of Systems      Objective:        Physical Exam       Altered Skin Integrity 06/10/22 0900 Left plantar Foot #1 Traumatic (Active)   06/10/22 0900   Altered Skin Integrity Present on Admission:    Side: Left   Orientation: plantar   Location: Foot   Wound Number: #1   Is this injury device related?:    Primary Wound Type: Traumatic   Description of Altered Skin Integrity:    Removal Indication and Assessment:    Wound Outcome:    (Retired) Wound Length (cm):    (Retired) Wound Width (cm):    (Retired) Depth (cm):    Wound Description (Comments):    Removal Indications:    Description of Altered Skin Integrity Full thickness tissue loss. Subcutaneous fat may be visible but bone, tendon or muscle are not exposed 06/14/22 1600   Dressing Appearance Intact;Moist drainage 06/14/22 1600   Drainage Amount Small 06/14/22 1600   Drainage Characteristics/Odor Serosanguineous 06/14/22 1600   Appearance Pink;Moist 06/14/22 1600   Tissue loss description Full thickness 06/14/22 1600   Red (%), Wound Tissue Color 100 % 06/14/22 1600   Periwound Area Dry;[place] 06/14/22 1600   Wound Edges Open 06/14/22 1600   Hendricks Classification (diabetic foot ulcers only) Grade 1 06/14/22 1600   Wound Length (cm) 0.6 cm 06/14/22 1600   Wound Width (cm) 0.4 cm 06/14/22 1600   Wound Depth (cm) 0.2 cm 06/14/22 1600   Wound Volume (cm^3) 0.048 cm^3 06/14/22 1600   Wound Surface Area (cm^2) 0.24 cm^2 06/14/22 1600   Care Cleansed with:;Sterile normal saline 06/14/22 1600   Dressing Applied;Changed;Foam;Cast padding;Compression wrap;Hydrofiber 06/14/22 1600    Periwound Care Absorptive dressing applied;Skin barrier film applied 06/14/22 1600   Compression Three layer compression 06/14/22 1600   Off Loading Off loading shoe;Football dressing 06/14/22 1600   Dressing Change Due 06/17/22 06/14/22 1600            Altered Skin Integrity 06/10/22 0900 Right anterior;lower Leg Other (comment) (Active)   06/10/22 0900   Altered Skin Integrity Present on Admission:    Side: Right   Orientation: anterior;lower   Location: Leg   Wound Number:    Is this injury device related?:    Primary Wound Type: Other   Description of Altered Skin Integrity:    Removal Indication and Assessment:    Wound Outcome:    (Retired) Wound Length (cm):    (Retired) Wound Width (cm):    (Retired) Depth (cm):    Wound Description (Comments):    Removal Indications:    Periwound Area Edematous 06/14/22 1600   Care Cleansed with:;Soap and water 06/14/22 1600   Dressing Applied;Tubular bandage 06/14/22 1600   Compression Tubular elasticized bandage 06/14/22 1600   Dressing Change Due 06/17/22 06/14/22 1600            Wound 06/10/22 0900 Abscess Left anterior;lower Leg (Active)   06/10/22 0900    Pre-existing: Yes   Primary Wound Type: Abscess   Side: Left   Orientation: anterior;lower   Location: Leg   Wound Number:    Ankle-Brachial Index:    Pulses:    Removal Indication and Assessment:    Wound Outcome:    (Retired) Wound Type:    (Retired) Wound Length (cm):    (Retired) Wound Width (cm):    (Retired) Depth (cm):    Wound Description (Comments):    Removal Indications:    Dressing Appearance Intact;Moist drainage 06/14/22 1600   Drainage Amount Moderate 06/14/22 1600   Drainage Characteristics/Odor Serosanguineous 06/14/22 1600   Appearance Red;Moist 06/14/22 1600   Tissue loss description Full thickness 06/14/22 1600   Red (%), Wound Tissue Color 100 % 06/14/22 1600   Periwound Area Edematous;[place] 06/14/22 1600   Wound Edges Open 06/14/22 1600   Wound Length (cm) 2.6 cm 06/14/22 1600   Wound Width  (cm) 1.5 cm 06/14/22 1600   Wound Depth (cm) 1 cm 06/14/22 1600   Wound Volume (cm^3) 3.9 cm^3 06/14/22 1600   Wound Surface Area (cm^2) 3.9 cm^2 06/14/22 1600   Care Cleansed with:;Sterile normal saline 06/14/22 1600   Dressing Applied;Changed;Hydrofiber;Absorptive Pad;Compression wrap 06/14/22 1600   Periwound Care Absorptive dressing applied 06/14/22 1600   Compression Three layer compression 06/14/22 1600   Off Loading Off loading shoe;Football dressing 06/14/22 1600   Dressing Change Due 06/17/22 06/14/22 1600         Assessment:         ICD-10-CM ICD-9-CM   1. Diabetic ulcer of left foot associated with diabetes mellitus due to underlying condition, unspecified part of foot, unspecified ulcer stage  E08.621 249.80    L97.529 707.15   2. Lymphedema of both lower extremities  I89.0 457.1   3. Cellulitis and abscess of leg  L03.119 682.6    L02.419        Left Foot and Leg   Cleanse wound with:Normal Saline   Lidocaine:PRN   Silver nitrate:PRN   Periwound care:Gentian Violet Prn maceration   Primary dressing:Hydrofera Ready to both wounds   Secondary dressing:Small abd, 2 violette foams to plantar wound.   Edema control: Elevate BLE as much as possible. Profore lite LLE toe to knee. Tubi  F RLE   Frequency: Twice weekly in clinic   Follow-up: Nurse visits until seen by MD. Castro 6/24/22.     Other Orders: Continue Augmentin PO as ordered.     BLE venous ultra sound ordered per  6/10/22.  Plan:   Tissue pathology and/or culture taken:  [] Yes [x] No   Sharp debridement performed:   [] Yes [x] No   Labs ordered this visit:   [] Yes [x] No   Imaging ordered this visit:   [] Yes [x] No           No orders of the defined types were placed in this encounter.       Follow up in about 3 days (around 6/17/2022).

## 2022-06-16 NOTE — PROCEDURES
"Routine Foot Care    Date/Time: 6/10/2022 8:46 AM  Performed by: Savanah Felton DPM  Authorized by: Savanah Felton DPM     Time out: Immediately prior to procedure a "time out" was called to verify the correct patient, procedure, equipment, support staff and site/side marked as required.    Consent Done?:  Yes (Verbal)  Hyperkeratotic Skin Lesions?: No      Nail Care Type:  Debride  Location(s): All  (Left 1st Toe, Left 3rd Toe, Left 2nd Toe, Left 4th Toe, Left 5th Toe, Right 1st Toe, Right 2nd Toe, Right 3rd Toe, Right 4th Toe and Right 5th Toe)  Patient tolerance:  Patient tolerated the procedure well with no immediate complications      "

## 2022-06-17 ENCOUNTER — HOSPITAL ENCOUNTER (OUTPATIENT)
Dept: WOUND CARE | Facility: HOSPITAL | Age: 67
Discharge: HOME | End: 2022-06-17
Attending: STUDENT IN AN ORGANIZED HEALTH CARE EDUCATION/TRAINING PROGRAM
Payer: MEDICARE

## 2022-06-17 DIAGNOSIS — L97.526 ULCER OF LEFT FOOT WITH BONE INVOLVEMENT WITHOUT EVIDENCE OF NECROSIS: ICD-10-CM

## 2022-06-17 PROCEDURE — 29581 APPL MULTLAYER CMPRN SYS LEG: CPT

## 2022-06-17 NOTE — PROGRESS NOTES
[facility]    Subjective:       Patient ID: Jian Arrieta is a 66 y.o. male.    Chief Complaint: Wound Care    Nurse visit for dressing change    Review of Systems      Objective:        Physical Exam       Altered Skin Integrity 06/10/22 0900 Left plantar Foot #1 Traumatic (Active)   06/10/22 0900   Altered Skin Integrity Present on Admission:    Side: Left   Orientation: plantar   Location: Foot   Wound Number: #1   Is this injury device related?:    Primary Wound Type: Traumatic   Description of Altered Skin Integrity:    Removal Indication and Assessment:    Wound Outcome:    (Retired) Wound Length (cm):    (Retired) Wound Width (cm):    (Retired) Depth (cm):    Wound Description (Comments):    Removal Indications:    Dressing Appearance Intact;Moist drainage 06/17/22 1100   Drainage Amount Small 06/17/22 1100   Drainage Characteristics/Odor Serosanguineous 06/17/22 1100   Appearance Intact;Pink;Moist 06/17/22 1100   Tissue loss description Full thickness 06/17/22 1100   Red (%), Wound Tissue Color 100 % 06/17/22 1100   Periwound Area Dry;[place] 06/17/22 1100   Wound Edges Open 06/17/22 1100   Hendricks Classification (diabetic foot ulcers only) Grade 1 06/17/22 1100   Wound Length (cm) 0.6 cm 06/17/22 1100   Wound Width (cm) 0.4 cm 06/17/22 1100   Wound Depth (cm) 0.2 cm 06/17/22 1100   Wound Volume (cm^3) 0.048 cm^3 06/17/22 1100   Wound Surface Area (cm^2) 0.24 cm^2 06/17/22 1100   Care Cleansed with:;Sterile normal saline;Soap and water 06/17/22 1100   Dressing Applied;Changed;Hydrofiber;Foam;Compression wrap 06/17/22 1100   Periwound Care Absorptive dressing applied 06/17/22 1100   Compression Three layer compression 06/17/22 1100   Off Loading Off loading shoe 06/17/22 1100   Dressing Change Due 06/21/22 06/17/22 1100            Wound 06/10/22 0900 Abscess Left anterior;lower Leg (Active)   06/10/22 0900    Pre-existing: Yes   Primary Wound Type: Abscess    Side: Left   Orientation: anterior;lower   Location: Leg   Wound Number:    Ankle-Brachial Index:    Pulses:    Removal Indication and Assessment:    Wound Outcome:    (Retired) Wound Type:    (Retired) Wound Length (cm):    (Retired) Wound Width (cm):    (Retired) Depth (cm):    Wound Description (Comments):    Removal Indications:    Dressing Appearance Intact;Moist drainage 06/17/22 1100   Drainage Amount Moderate 06/17/22 1100   Drainage Characteristics/Odor Serosanguineous 06/17/22 1100   Appearance Intact;Red;Moist 06/17/22 1100   Tissue loss description Full thickness 06/17/22 1100   Red (%), Wound Tissue Color 100 % 06/17/22 1100   Periwound Area Edematous 06/17/22 1100   Wound Edges Open 06/17/22 1100   Wound Length (cm) 2.6 cm 06/17/22 1100   Wound Width (cm) 1.5 cm 06/17/22 1100   Wound Depth (cm) 1 cm 06/17/22 1100   Wound Volume (cm^3) 3.9 cm^3 06/17/22 1100   Wound Surface Area (cm^2) 3.9 cm^2 06/17/22 1100   Care Cleansed with:;Soap and water;Sterile normal saline 06/17/22 1100   Dressing Applied;Changed;Hydrofiber;Foam;Compression wrap 06/17/22 1100   Periwound Care Absorptive dressing applied 06/17/22 1100   Compression Three layer compression 06/17/22 1100   Off Loading Off loading shoe 06/17/22 1100   Dressing Change Due 06/21/22 06/17/22 1100         Assessment:       No diagnosis found.      Plan:   Tissue pathology and/or culture taken:  [] Yes [x] No   Sharp debridement performed:   [] Yes [x] No   Labs ordered this visit:   [] Yes [x] No   Imaging ordered this visit:   [] Yes [x] No         eft Foot and Leg   Cleanse wound with:Normal Saline   Lidocaine:PRN   Silver nitrate:PRN   Periwound care:Gentian Violet Prn maceration   Primary dressing:Hydrofera Ready to both wounds   Secondary dressing:Small abd, 2 violette foams to plantar wound.   Edema control: Elevate BLE as much as possible. Profore lite LLE toe to knee. Tubi  F RLE   Frequency: Twice weekly in clinic   Follow-up: Nurse  visits until seen by MD. Castro 6/24/22.     Other Orders: Continue Augmentin PO as ordered.     BLE venous ultra sound ordered per  6/10/22.       Follow up in about 4 days (around 6/21/2022).

## 2022-06-21 ENCOUNTER — HOSPITAL ENCOUNTER (OUTPATIENT)
Dept: WOUND CARE | Facility: HOSPITAL | Age: 67
Discharge: HOME | End: 2022-06-21
Attending: STUDENT IN AN ORGANIZED HEALTH CARE EDUCATION/TRAINING PROGRAM
Payer: MEDICARE

## 2022-06-21 DIAGNOSIS — L97.522 ULCER OF LEFT FOOT WITH FAT LAYER EXPOSED: ICD-10-CM

## 2022-06-21 PROCEDURE — 29581 APPL MULTLAYER CMPRN SYS LEG: CPT

## 2022-06-21 NOTE — PROGRESS NOTES
[facility]    Subjective:       Patient ID: Jian Arrieta is a 66 y.o. male.    Chief Complaint: Wound Care    6/21/22: Nurse visit for dressing change.     Review of Systems      Objective:        Physical Exam       Altered Skin Integrity 06/10/22 0900 Left plantar Foot #1 Traumatic (Active)   06/10/22 0900   Altered Skin Integrity Present on Admission:    Side: Left   Orientation: plantar   Location: Foot   Wound Number: #1   Is this injury device related?:    Primary Wound Type: Traumatic   Description of Altered Skin Integrity:    Removal Indication and Assessment:    Wound Outcome:    (Retired) Wound Length (cm):    (Retired) Wound Width (cm):    (Retired) Depth (cm):    Wound Description (Comments):    Removal Indications:    Dressing Appearance Intact;Moist drainage 06/21/22 1400   Drainage Amount Small 06/21/22 1400   Drainage Characteristics/Odor Serosanguineous 06/21/22 1400   Appearance Pink;Intact;Moist 06/21/22 1400   Tissue loss description Full thickness 06/21/22 1400   Red (%), Wound Tissue Color 100 % 06/21/22 1400   Periwound Area Dry;[place] 06/21/22 1400   Wound Edges Open 06/21/22 1400   Hendricks Classification (diabetic foot ulcers only) Grade 1 06/21/22 1400   Wound Length (cm) 0.6 cm 06/21/22 1400   Wound Width (cm) 0.4 cm 06/21/22 1400   Wound Depth (cm) 0.2 cm 06/21/22 1400   Wound Volume (cm^3) 0.048 cm^3 06/21/22 1400   Wound Surface Area (cm^2) 0.24 cm^2 06/21/22 1400   Care Cleansed with:;Sterile normal saline 06/21/22 1400   Dressing Changed;Applied;Hydrofiber;Foam;Compression wrap 06/21/22 1400   Periwound Care Absorptive dressing applied 06/21/22 1400   Compression Three layer compression 06/21/22 1400   Off Loading Off loading shoe 06/21/22 1400   Dressing Change Due 06/24/22 06/21/22 1400            Altered Skin Integrity 06/10/22 0900 Right anterior;lower Leg Other (comment) (Active)   06/10/22 0900   Altered Skin Integrity Present on  Admission:    Side: Right   Orientation: anterior;lower   Location: Leg   Wound Number:    Is this injury device related?:    Primary Wound Type: Other   Description of Altered Skin Integrity:    Removal Indication and Assessment:    Wound Outcome:    (Retired) Wound Length (cm):    (Retired) Wound Width (cm):    (Retired) Depth (cm):    Wound Description (Comments):    Removal Indications:    Description of Altered Skin Integrity Intact skin with non-blanchable redness of localized area 06/21/22 1400   Periwound Area Dry;Edematous 06/21/22 1400   Wound Edges Rolled/closed 06/21/22 1400   Wound Length (cm) 0 cm 06/21/22 1400   Wound Width (cm) 0 cm 06/21/22 1400   Wound Depth (cm) 0 cm 06/21/22 1400   Wound Volume (cm^3) 0 cm^3 06/21/22 1400   Wound Surface Area (cm^2) 0 cm^2 06/21/22 1400   Care Cleansed with:;Soap and water 06/21/22 1400   Dressing Changed;Applied;Tubular bandage 06/21/22 1400   Compression Tubular elasticized bandage 06/21/22 1400   Dressing Change Due 06/24/22 06/21/22 1400            Wound 06/10/22 0900 Abscess Left anterior;lower Leg (Active)   06/10/22 0900    Pre-existing: Yes   Primary Wound Type: Abscess   Side: Left   Orientation: anterior;lower   Location: Leg   Wound Number:    Ankle-Brachial Index:    Pulses:    Removal Indication and Assessment:    Wound Outcome:    (Retired) Wound Type:    (Retired) Wound Length (cm):    (Retired) Wound Width (cm):    (Retired) Depth (cm):    Wound Description (Comments):    Removal Indications:    Dressing Appearance Intact;Moist drainage 06/21/22 1400   Drainage Amount Moderate 06/21/22 1400   Drainage Characteristics/Odor Serosanguineous 06/21/22 1400   Appearance Intact;Red;Moist 06/21/22 1400   Tissue loss description Full thickness 06/21/22 1400   Red (%), Wound Tissue Color 100 % 06/21/22 1400   Periwound Area Edematous 06/21/22 1400   Wound Edges Open 06/21/22 1400   Wound Length (cm) 2.6 cm 06/21/22 1400   Wound Width (cm) 1.5 cm 06/21/22  1400   Wound Depth (cm) 1 cm 06/21/22 1400   Wound Volume (cm^3) 3.9 cm^3 06/21/22 1400   Wound Surface Area (cm^2) 3.9 cm^2 06/21/22 1400   Care Cleansed with:;Sterile normal saline 06/21/22 1400   Dressing Changed;Applied;Hydrofiber;Foam;Compression wrap 06/21/22 1400   Periwound Care Absorptive dressing applied 06/21/22 1400   Compression Three layer compression 06/21/22 1400   Off Loading Off loading shoe 06/21/22 1400   Dressing Change Due 06/24/22 06/21/22 1400         Assessment:       No diagnosis found.      Plan:   Tissue pathology and/or culture taken:  [] Yes [x] No   Sharp debridement performed:   [] Yes [x] No   Labs ordered this visit:   [] Yes [x] No   Imaging ordered this visit:   [] Yes [x] No       Left Foot and Leg   Cleanse wound with:Normal Saline   Lidocaine:PRN   Silver nitrate:PRN   Periwound care:Gentian Violet Prn maceration   Primary dressing:Hydrofera Ready to both wounds   Secondary dressing:Small abd, 2 violette foams to plantar wound.   Edema control: Elevate BLE as much as possible. Profore lite LLE toe to knee. Tubi  F RLE   Frequency: Twice weekly in clinic   Follow-up: Nurse visits until seen by MD. Castro 6/24/22.     Other Orders: Continue Augmentin PO as ordered.     BLE venous ultra sound ordered per  6/10/22.      No orders of the defined types were placed in this encounter.       Follow up in about 3 days (around 6/24/2022) for .

## 2022-06-22 ENCOUNTER — LAB VISIT (OUTPATIENT)
Dept: LAB | Facility: HOSPITAL | Age: 67
End: 2022-06-22
Attending: NURSE PRACTITIONER
Payer: MEDICARE

## 2022-06-22 DIAGNOSIS — E13.9 LADA (LATENT AUTOIMMUNE DIABETES IN ADULTS), MANAGED AS TYPE 1: ICD-10-CM

## 2022-06-22 LAB
ALBUMIN SERPL BCP-MCNC: 2.9 G/DL (ref 3.5–5.2)
ALP SERPL-CCNC: 115 U/L (ref 55–135)
ALT SERPL W/O P-5'-P-CCNC: 60 U/L (ref 10–44)
ANION GAP SERPL CALC-SCNC: 4 MMOL/L (ref 8–16)
AST SERPL-CCNC: 57 U/L (ref 10–40)
BILIRUB SERPL-MCNC: 0.3 MG/DL (ref 0.1–1)
BUN SERPL-MCNC: 23 MG/DL (ref 8–23)
CALCIUM SERPL-MCNC: 8.4 MG/DL (ref 8.7–10.5)
CHLORIDE SERPL-SCNC: 110 MMOL/L (ref 95–110)
CO2 SERPL-SCNC: 28 MMOL/L (ref 23–29)
CREAT SERPL-MCNC: 1.8 MG/DL (ref 0.5–1.4)
EST. GFR  (AFRICAN AMERICAN): 44 ML/MIN/1.73 M^2
EST. GFR  (NON AFRICAN AMERICAN): 38.1 ML/MIN/1.73 M^2
ESTIMATED AVG GLUCOSE: 283 MG/DL (ref 68–131)
GLUCOSE SERPL-MCNC: 89 MG/DL (ref 70–110)
HBA1C MFR BLD: 11.5 % (ref 4–5.6)
POTASSIUM SERPL-SCNC: 5 MMOL/L (ref 3.5–5.1)
PROT SERPL-MCNC: 5.7 G/DL (ref 6–8.4)
SODIUM SERPL-SCNC: 142 MMOL/L (ref 136–145)

## 2022-06-22 PROCEDURE — 83036 HEMOGLOBIN GLYCOSYLATED A1C: CPT | Performed by: NURSE PRACTITIONER

## 2022-06-22 PROCEDURE — 36415 COLL VENOUS BLD VENIPUNCTURE: CPT | Mod: PO | Performed by: NURSE PRACTITIONER

## 2022-06-22 PROCEDURE — 80053 COMPREHEN METABOLIC PANEL: CPT | Performed by: NURSE PRACTITIONER

## 2022-06-27 ENCOUNTER — HOSPITAL ENCOUNTER (OUTPATIENT)
Dept: RADIOLOGY | Facility: HOSPITAL | Age: 67
Discharge: HOME | End: 2022-06-27
Attending: UROLOGY
Payer: MEDICARE

## 2022-06-27 ENCOUNTER — HOSPITAL ENCOUNTER (OUTPATIENT)
Dept: WOUND CARE | Facility: HOSPITAL | Age: 67
Discharge: HOME | End: 2022-06-27
Attending: STUDENT IN AN ORGANIZED HEALTH CARE EDUCATION/TRAINING PROGRAM
Payer: MEDICARE

## 2022-06-27 ENCOUNTER — HOSPITAL ENCOUNTER (OUTPATIENT)
Dept: RADIOLOGY | Facility: HOSPITAL | Age: 67
Discharge: HOME | End: 2022-06-27
Attending: INTERNAL MEDICINE
Payer: MEDICARE

## 2022-06-27 DIAGNOSIS — L97.526 ULCER OF LEFT FOOT WITH BONE INVOLVEMENT WITHOUT EVIDENCE OF NECROSIS: ICD-10-CM

## 2022-06-27 DIAGNOSIS — N22 CALCULUS OF URINARY TRACT IN DISEASES CLASSIFIED ELSEWHERE: ICD-10-CM

## 2022-06-27 DIAGNOSIS — R60.0 LEG EDEMA, LEFT: ICD-10-CM

## 2022-06-27 DIAGNOSIS — L97.522 ULCER OF LEFT FOOT WITH FAT LAYER EXPOSED: Primary | ICD-10-CM

## 2022-06-27 DIAGNOSIS — I87.2 VENOUS INSUFFICIENCY OF BOTH LOWER EXTREMITIES: ICD-10-CM

## 2022-06-27 DIAGNOSIS — B35.1 ONYCHOMYCOSIS: ICD-10-CM

## 2022-06-27 DIAGNOSIS — N20.0 KIDNEY STONE: ICD-10-CM

## 2022-06-27 DIAGNOSIS — I89.0 LYMPHEDEMA OF BOTH LOWER EXTREMITIES: ICD-10-CM

## 2022-06-27 DIAGNOSIS — L97.529 DIABETIC ULCER OF LEFT FOOT ASSOCIATED WITH DIABETES MELLITUS DUE TO UNDERLYING CONDITION, UNSPECIFIED PART OF FOOT, UNSPECIFIED ULCER STAGE: ICD-10-CM

## 2022-06-27 DIAGNOSIS — E08.621 DIABETIC ULCER OF LEFT FOOT ASSOCIATED WITH DIABETES MELLITUS DUE TO UNDERLYING CONDITION, UNSPECIFIED PART OF FOOT, UNSPECIFIED ULCER STAGE: ICD-10-CM

## 2022-06-27 PROCEDURE — 93970 EXTREMITY STUDY: CPT | Mod: 26,,, | Performed by: RADIOLOGY

## 2022-06-27 PROCEDURE — 29581 APPL MULTLAYER CMPRN SYS LEG: CPT

## 2022-06-27 PROCEDURE — 74018 RADEX ABDOMEN 1 VIEW: CPT | Mod: 26,,, | Performed by: RADIOLOGY

## 2022-06-27 PROCEDURE — 74176 CT ABD & PELVIS W/O CONTRAST: CPT | Mod: 26,,, | Performed by: RADIOLOGY

## 2022-06-27 PROCEDURE — 93970 EXTREMITY STUDY: CPT | Mod: TC

## 2022-06-27 PROCEDURE — 93970 US LOWER EXTREMITY VEINS BILATERAL INSUFFICIENCY: ICD-10-PCS | Mod: 26,,, | Performed by: RADIOLOGY

## 2022-06-27 PROCEDURE — 74018 RADEX ABDOMEN 1 VIEW: CPT | Mod: TC,FY

## 2022-06-27 PROCEDURE — 74018 XR ABDOMEN AP 1 VIEW: ICD-10-PCS | Mod: 26,,, | Performed by: RADIOLOGY

## 2022-06-27 PROCEDURE — 74176 CT ABD & PELVIS W/O CONTRAST: CPT | Mod: TC

## 2022-06-27 PROCEDURE — 74176 CT RENAL STONE STUDY ABD PELVIS WO: ICD-10-PCS | Mod: 26,,, | Performed by: RADIOLOGY

## 2022-06-27 PROCEDURE — 93970 EXTREMITY STUDY: CPT | Mod: TC,50,LT

## 2022-06-27 NOTE — PROGRESS NOTES
[facility]    Subjective:       Patient ID: Jian Arrieta is a 66 y.o. male.    Chief Complaint: No chief complaint on file.    6/27/22:  Nurse visit for wound assessment and dressing change.  No new complaints.  Care tolerated well.      Review of Systems      Objective:        Physical Exam       Altered Skin Integrity 06/10/22 0900 Left plantar Foot #1 Traumatic (Active)   06/10/22 0900   Altered Skin Integrity Present on Admission:    Side: Left   Orientation: plantar   Location: Foot   Wound Number: #1   Is this injury device related?:    Primary Wound Type: Traumatic   Description of Altered Skin Integrity:    Removal Indication and Assessment:    Wound Outcome:    (Retired) Wound Length (cm):    (Retired) Wound Width (cm):    (Retired) Depth (cm):    Wound Description (Comments):    Removal Indications:    Wound Image   06/27/22 1000   Dressing Appearance Intact;Dry 06/27/22 1000   Drainage Amount None 06/27/22 1000   Appearance Dry 06/27/22 1000   Tissue loss description Not applicable 06/27/22 1000   Periwound Area Dry 06/27/22 1000   Wound Edges Callused 06/27/22 1000   Hendricks Classification (diabetic foot ulcers only) Grade 1 06/27/22 1000   Care Cleansed with:;Sterile normal saline 06/27/22 1000   Dressing Applied;Changed;Hydrofiber;Foam;Compression wrap 06/27/22 1000   Compression Three layer compression 06/27/22 1000   Off Loading Off loading shoe;Football dressing 06/27/22 1000   Dressing Change Due 07/01/22 06/27/22 1000            Altered Skin Integrity 06/10/22 0900 Right anterior;lower Leg Other (comment) (Active)   06/10/22 0900   Altered Skin Integrity Present on Admission:    Side: Right   Orientation: anterior;lower   Location: Leg   Wound Number:    Is this injury device related?:    Primary Wound Type: Other   Description of Altered Skin Integrity:    Removal Indication and Assessment:    Wound Outcome:    (Retired) Wound Length (cm):     (Retired) Wound Width (cm):    (Retired) Depth (cm):    Wound Description (Comments):    Removal Indications:    Wound Image   06/27/22 1000   Description of Altered Skin Integrity Intact skin with non-blanchable redness of localized area 06/27/22 1000   Periwound Area Dry;Edematous 06/27/22 1000   Care Cleansed with:;Soap and water 06/27/22 1000   Dressing Applied;Changed;Tubular bandage 06/27/22 1000   Compression Tubular elasticized bandage 06/27/22 1000   Dressing Change Due 07/01/22 06/27/22 1000            Wound 06/10/22 0900 Abscess Left anterior;lower Leg (Active)   06/10/22 0900    Pre-existing: Yes   Primary Wound Type: Abscess   Side: Left   Orientation: anterior;lower   Location: Leg   Wound Number:    Ankle-Brachial Index:    Pulses:    Removal Indication and Assessment:    Wound Outcome:    (Retired) Wound Type:    (Retired) Wound Length (cm):    (Retired) Wound Width (cm):    (Retired) Depth (cm):    Wound Description (Comments):    Removal Indications:    Wound Image   06/27/22 1000   Dressing Appearance Intact;Moist drainage 06/27/22 1000   Drainage Amount Moderate 06/27/22 1000   Drainage Characteristics/Odor Serosanguineous 06/27/22 1000   Appearance Intact;Red;Moist;Epithelialization 06/27/22 1000   Tissue loss description Full thickness 06/27/22 1000   Red (%), Wound Tissue Color 100 % 06/27/22 1000   Periwound Area Edematous 06/27/22 1000   Wound Edges Open 06/27/22 1000   Wound Length (cm) 2.6 cm 06/27/22 1000   Wound Width (cm) 1.5 cm 06/27/22 1000   Wound Depth (cm) 1 cm 06/27/22 1000   Wound Volume (cm^3) 3.9 cm^3 06/27/22 1000   Wound Surface Area (cm^2) 3.9 cm^2 06/27/22 1000   Care Cleansed with:;Sterile normal saline 06/27/22 1000   Dressing Applied;Changed;Hydrofiber;Foam;Compression wrap 06/27/22 1000   Periwound Care Absorptive dressing applied 06/27/22 1000   Compression Three layer compression 06/27/22 1000   Off Loading Off loading shoe 06/27/22 1000   Dressing Change Due  07/01/22 06/27/22 1000         Assessment:         ICD-10-CM ICD-9-CM   1. Ulcer of left foot with fat layer exposed  L97.522 707.15   2. Ulcer of left foot with bone involvement without evidence of necrosis  L97.526 707.15   3. Diabetic ulcer of left foot associated with diabetes mellitus due to underlying condition, unspecified part of foot, unspecified ulcer stage  E08.621 249.80    L97.529 707.15   4. Lymphedema of both lower extremities  I89.0 457.1   5. Onychomycosis  B35.1 110.1       Left Foot and Leg   Cleanse wound with:Normal Saline   Lidocaine:PRN   Silver nitrate:PRN   Periwound care:Gentian Violet Prn maceration   Primary dressing:Hydrofera Ready to both wounds   Secondary dressing:Small abd, 2 violette foams to plantar wound.   Edema control: Elevate BLE as much as possible. Profore lite LLE toe to knee. Tubi  F RLE   Frequency: Twice weekly in clinic   Follow-up: Nurse visits until seen by MD. Castro 6/24/22.     Other Orders: Continue Augmentin PO as ordered.     BLE venous ultra sound ordered per  6/10/22.  Plan:   Tissue pathology and/or culture taken:  [] Yes [x] No   Sharp debridement performed:   [] Yes [x] No   Labs ordered this visit:   [] Yes [x] No   Imaging ordered this visit:   [] Yes [x] No           No orders of the defined types were placed in this encounter.       Follow up in about 4 days (around 7/1/2022).

## 2022-06-29 ENCOUNTER — OFFICE VISIT (OUTPATIENT)
Dept: INTERNAL MEDICINE | Facility: CLINIC | Age: 67
End: 2022-06-29
Payer: MEDICARE

## 2022-06-29 ENCOUNTER — TELEPHONE (OUTPATIENT)
Dept: DIABETES | Facility: CLINIC | Age: 67
End: 2022-06-29
Payer: MEDICARE

## 2022-06-29 VITALS
WEIGHT: 259.25 LBS | BODY MASS INDEX: 40.69 KG/M2 | RESPIRATION RATE: 18 BRPM | HEART RATE: 78 BPM | SYSTOLIC BLOOD PRESSURE: 120 MMHG | OXYGEN SATURATION: 99 % | TEMPERATURE: 98 F | HEIGHT: 67 IN | DIASTOLIC BLOOD PRESSURE: 66 MMHG

## 2022-06-29 DIAGNOSIS — E78.5 HYPERLIPIDEMIA ASSOCIATED WITH TYPE 2 DIABETES MELLITUS: Chronic | ICD-10-CM

## 2022-06-29 DIAGNOSIS — E66.01 MORBID OBESITY: ICD-10-CM

## 2022-06-29 DIAGNOSIS — Z79.4 TYPE 2 DIABETES MELLITUS WITH STAGE 3 CHRONIC KIDNEY DISEASE, WITH LONG-TERM CURRENT USE OF INSULIN, UNSPECIFIED WHETHER STAGE 3A OR 3B CKD: ICD-10-CM

## 2022-06-29 DIAGNOSIS — Z98.84 STATUS POST BARIATRIC SURGERY: ICD-10-CM

## 2022-06-29 DIAGNOSIS — N18.30 STAGE 3 CHRONIC KIDNEY DISEASE, UNSPECIFIED WHETHER STAGE 3A OR 3B CKD: Chronic | ICD-10-CM

## 2022-06-29 DIAGNOSIS — I15.2 HYPERTENSION ASSOCIATED WITH DIABETES: Chronic | ICD-10-CM

## 2022-06-29 DIAGNOSIS — E11.22 TYPE 2 DIABETES MELLITUS WITH STAGE 3 CHRONIC KIDNEY DISEASE, WITH LONG-TERM CURRENT USE OF INSULIN, UNSPECIFIED WHETHER STAGE 3A OR 3B CKD: ICD-10-CM

## 2022-06-29 DIAGNOSIS — E88.09 HYPOALBUMINEMIA: ICD-10-CM

## 2022-06-29 DIAGNOSIS — E13.9 LADA (LATENT AUTOIMMUNE DIABETES IN ADULTS), MANAGED AS TYPE 1: Primary | ICD-10-CM

## 2022-06-29 DIAGNOSIS — E11.69 HYPERLIPIDEMIA ASSOCIATED WITH TYPE 2 DIABETES MELLITUS: Chronic | ICD-10-CM

## 2022-06-29 DIAGNOSIS — N18.30 TYPE 2 DIABETES MELLITUS WITH STAGE 3 CHRONIC KIDNEY DISEASE, WITH LONG-TERM CURRENT USE OF INSULIN, UNSPECIFIED WHETHER STAGE 3A OR 3B CKD: ICD-10-CM

## 2022-06-29 DIAGNOSIS — I87.2 VENOUS INSUFFICIENCY OF BOTH LOWER EXTREMITIES: ICD-10-CM

## 2022-06-29 DIAGNOSIS — E11.42 DIABETIC POLYNEUROPATHY ASSOCIATED WITH TYPE 2 DIABETES MELLITUS: ICD-10-CM

## 2022-06-29 DIAGNOSIS — E11.59 HYPERTENSION ASSOCIATED WITH DIABETES: Chronic | ICD-10-CM

## 2022-06-29 DIAGNOSIS — I50.42 CHRONIC COMBINED SYSTOLIC AND DIASTOLIC HEART FAILURE: ICD-10-CM

## 2022-06-29 DIAGNOSIS — I48.0 PAROXYSMAL ATRIAL FIBRILLATION: ICD-10-CM

## 2022-06-29 PROCEDURE — 99214 OFFICE O/P EST MOD 30 MIN: CPT | Mod: S$GLB,,, | Performed by: NURSE PRACTITIONER

## 2022-06-29 PROCEDURE — 1159F PR MEDICATION LIST DOCUMENTED IN MEDICAL RECORD: ICD-10-PCS | Mod: CPTII,S$GLB,, | Performed by: NURSE PRACTITIONER

## 2022-06-29 PROCEDURE — 3046F HEMOGLOBIN A1C LEVEL >9.0%: CPT | Mod: CPTII,S$GLB,, | Performed by: NURSE PRACTITIONER

## 2022-06-29 PROCEDURE — 3066F NEPHROPATHY DOC TX: CPT | Mod: CPTII,S$GLB,, | Performed by: NURSE PRACTITIONER

## 2022-06-29 PROCEDURE — 95251 CONT GLUC MNTR ANALYSIS I&R: CPT | Mod: S$GLB,,, | Performed by: NURSE PRACTITIONER

## 2022-06-29 PROCEDURE — 99999 PR PBB SHADOW E&M-EST. PATIENT-LVL IV: ICD-10-PCS | Mod: PBBFAC,,, | Performed by: NURSE PRACTITIONER

## 2022-06-29 PROCEDURE — 3078F DIAST BP <80 MM HG: CPT | Mod: CPTII,S$GLB,, | Performed by: NURSE PRACTITIONER

## 2022-06-29 PROCEDURE — 3062F PR POS MACROALBUMINURIA RESULT DOCUMENTED/REVIEW: ICD-10-PCS | Mod: CPTII,S$GLB,, | Performed by: NURSE PRACTITIONER

## 2022-06-29 PROCEDURE — 99214 PR OFFICE/OUTPT VISIT, EST, LEVL IV, 30-39 MIN: ICD-10-PCS | Mod: S$GLB,,, | Performed by: NURSE PRACTITIONER

## 2022-06-29 PROCEDURE — 1159F MED LIST DOCD IN RCRD: CPT | Mod: CPTII,S$GLB,, | Performed by: NURSE PRACTITIONER

## 2022-06-29 PROCEDURE — 1126F AMNT PAIN NOTED NONE PRSNT: CPT | Mod: CPTII,S$GLB,, | Performed by: NURSE PRACTITIONER

## 2022-06-29 PROCEDURE — 1126F PR PAIN SEVERITY QUANTIFIED, NO PAIN PRESENT: ICD-10-PCS | Mod: CPTII,S$GLB,, | Performed by: NURSE PRACTITIONER

## 2022-06-29 PROCEDURE — 3008F PR BODY MASS INDEX (BMI) DOCUMENTED: ICD-10-PCS | Mod: CPTII,S$GLB,, | Performed by: NURSE PRACTITIONER

## 2022-06-29 PROCEDURE — 3074F SYST BP LT 130 MM HG: CPT | Mod: CPTII,S$GLB,, | Performed by: NURSE PRACTITIONER

## 2022-06-29 PROCEDURE — 95251 PR GLUCOSE MONITOR, 72 HOUR, PHYS INTERP: ICD-10-PCS | Mod: S$GLB,,, | Performed by: NURSE PRACTITIONER

## 2022-06-29 PROCEDURE — 3008F BODY MASS INDEX DOCD: CPT | Mod: CPTII,S$GLB,, | Performed by: NURSE PRACTITIONER

## 2022-06-29 PROCEDURE — 3074F PR MOST RECENT SYSTOLIC BLOOD PRESSURE < 130 MM HG: ICD-10-PCS | Mod: CPTII,S$GLB,, | Performed by: NURSE PRACTITIONER

## 2022-06-29 PROCEDURE — 1160F PR REVIEW ALL MEDS BY PRESCRIBER/CLIN PHARMACIST DOCUMENTED: ICD-10-PCS | Mod: CPTII,S$GLB,, | Performed by: NURSE PRACTITIONER

## 2022-06-29 PROCEDURE — 3078F PR MOST RECENT DIASTOLIC BLOOD PRESSURE < 80 MM HG: ICD-10-PCS | Mod: CPTII,S$GLB,, | Performed by: NURSE PRACTITIONER

## 2022-06-29 PROCEDURE — 99999 PR PBB SHADOW E&M-EST. PATIENT-LVL IV: CPT | Mod: PBBFAC,,, | Performed by: NURSE PRACTITIONER

## 2022-06-29 PROCEDURE — 3046F PR MOST RECENT HEMOGLOBIN A1C LEVEL > 9.0%: ICD-10-PCS | Mod: CPTII,S$GLB,, | Performed by: NURSE PRACTITIONER

## 2022-06-29 PROCEDURE — 3066F PR DOCUMENTATION OF TREATMENT FOR NEPHROPATHY: ICD-10-PCS | Mod: CPTII,S$GLB,, | Performed by: NURSE PRACTITIONER

## 2022-06-29 PROCEDURE — 1160F RVW MEDS BY RX/DR IN RCRD: CPT | Mod: CPTII,S$GLB,, | Performed by: NURSE PRACTITIONER

## 2022-06-29 PROCEDURE — 3062F POS MACROALBUMINURIA REV: CPT | Mod: CPTII,S$GLB,, | Performed by: NURSE PRACTITIONER

## 2022-06-29 RX ORDER — INSULIN LISPRO-AABC 100 [IU]/ML
80 INJECTION, SOLUTION INTRAVENOUS; SUBCUTANEOUS CONTINUOUS
Qty: 40 ML | Refills: 3 | Status: SHIPPED | OUTPATIENT
Start: 2022-06-29 | End: 2023-03-13

## 2022-06-29 RX ORDER — INSULIN PUMP CONTROLLER
1 EACH MISCELLANEOUS EVERY OTHER DAY
Qty: 15 EACH | Refills: 11 | Status: SHIPPED | OUTPATIENT
Start: 2022-06-29 | End: 2023-06-07

## 2022-06-29 RX ORDER — INSULIN GLARGINE 300 U/ML
40 INJECTION, SOLUTION SUBCUTANEOUS DAILY
Qty: 5 PEN | Refills: 1 | Status: SHIPPED | OUTPATIENT
Start: 2022-06-29 | End: 2022-08-21 | Stop reason: SDUPTHER

## 2022-06-29 NOTE — PROGRESS NOTES
"66 y.o. male here for 3 month follow up visit for management of T1dm -   EOLISE - formerly treated as type 2 -   History of pancreatitis.   Last seen in March 2022 - those notes are below.     a1c has been elevated in past, now extremely high   - up from 8.1% now to 11.5%   He's been very stressed since last visit -   Mother in law passed away - he's been very busy cleaning out her house.   Working a lot/sweating - states his vgo patch is just falling off.   He is nervous to be low/go low, so sometimes doesn't click with his meals.   Also got an infection in his left foot and in his calf in the interim - it has been treated.   But he isn't sure if his sugars were high prior and that's what caused the infection, or if the infection is what has caused his sugars to go high.     He is on vgo 30 still - using humalog insulin with it.   Usually gives 2 or 4 clicks with meals.   Often only "clicks" 1 or 2 times per day.   He denies any known low sugars, except for if he boluses "too much".   He is not following Ada diet.     Using dexcom g6 cgm - downloaded and reviewed today -   Using with his cellphone - see scanned in .     Average glucose 275 -   15% time in range.   0% lows.   23% highs.   61% very highs        Last visit notes as follows from march 2022:   66 y.o. gentleman, here for 3 month follow up visit oer routine for Eloise - T1dm -   Last seen in December 2021 - those notes are below.     a1c decreased from 8.6% to 8.1%.   He increased from the vgo 20 to vgo 30 - still using humalog insulin   That has helped his overnight sugars, but he is still high during the day.   He often gives 4 clicks instead of 3 clicks with meals.   States sometimes he runs out of insulin in his patch if he gives too much.   We had discussed omnipod at his last visit, but he wanted to try the higher dose vgo patch.   He is not following Ada diet -     On dexcom G6 personal to check his sugars  Downloaded and reviewed today - "   See scanned in  -   Average glucose 239 -   30% time in range.   <1% lows.   27% highs.   42% very highs.       Last visit notes from December 2021 -   66 y.o. T1dm - ELOISE - 3 month follow up.   Last seen in Septemer 2021 - those notes are velow.     His a1c remains elevated still @ 8.6%   Continues on VGO 20 -   States using same insulin - humalog -  4 clicks with meals.   No longer on metformin.   He has history of pancreatitis (2018) -  So cannot take glp1 or dpp4's -     He is not following ADA diet.   Eats out often, fried foods, high carb foods.   States he occasionally has lows when he gives boluses after he eats instead of before the meal.   He complains of right shoulder pain today -   Follows with Dr. Diaz - and is seeing him for kidney stone follow up.   Also c/o ED - has used viagra in past. Requests appt. With Dr. Diaz    Continues on Dexcom G6 - see scanned in .   Average glucose 258 -   24% time in range.   1% lows.   <1% very low. (states following a bolus/correction bolus).   26% highs.   48% extreme highs.       Last visit notes as follows from September 2021:   HPI: Jian Arrieta is a 66 y.o.  male c/I for visit to address Diabetes Type 1 (ELOISE adult onset)   This is the first time I am seeing him for care, follows with Dr. Leon Barajas, DO for primary care needs.   Has seen endocrinology - Luisa Garcia NP in past - last visit was 8/9/2021 - those notes are below.  Had seen Dr. Carney and dr. Stauffer with endocrinology in past prior visits.     he is establishing care with me today however.   Met last with GINO Machado 8/23/2021 -     was diagnosed with T2DM originally in 2016 -   He has history of pancreatitis in 2018 - portal vein thrombosis and underwent angioplasty.   now treated as T1 - ELOISE -   Had gallbladder removed as well.     Is on insulin only now via vgo patch - on VGO 20    Has never been hospitalized r/t DM.  Denies missing doses of DM  medication - however admits he misses he meal time dose of insulin.     Hgba1c has increased from 7.7%  to 8.6%- now improved to 8.3% currently.   Not always following ADA diet. Eats out 2 - 3 times per day.   Local restaurants/pastas/fried food/waffle house.   vgo 20 - 2 - 3 clicks with breakfast.   4 clicks with lunch and dinner.   1 click with a snack.     He reports forgetting to click sometimes - and waits to click after meals.   Also fearful of hypoglycemia - so will under click.   Working in yard/increased activity or sweating - has low blood sugars.   He has baqsimi to correct     On dexcom g6 for checking sugars - downloaded and reviewed today -   Average glucose 243  32% time in range.   0% lows.   24% highs.   44% extreme highs.     Complications from diabetes and pertinent co-morbidities include:   Negative for DKA.   Has been on insulin for several years.   + for diabetic neuropathy.   + for nephropathy - CKD stage 3   + microalbuminuria.   Eyes:  negative for Diabetic retinopathy   CV: Denies history of MI nor stroke.   CAD: yes and history of CABG   Also heart failure history   Takes aspirin 81mg tablet daily  BP: has history of HTN  Statin: Taking  ACE/ARB: Not taking    Last notes from RADHA Garcia from August 2021 as follows:   Pt returns for follow up of type 2 diabetes complicated by Charcot foot, foot ulcer-now resolved, kidney stone, BMI 39, CKD stage III, s/p sleeve gastrectomy that is uncontrolled and complicated.  Previously seen by Dr. Carney and Dr. Stauffer and myself. Last visit in April 2021.   He has dentures!!    He had hydrocele repair on 6/16/2021 -- also had kidney stones at the time. Had stent placed and removed. States he has kidney stones on the left and will be seeing Dr. Diaz.      With regards to the diabetes:   Diagnosed with Type 2 DM in 2016  Episode of pancreatitis around 2018,   Family History of Type 2 DM: none  Known complications:   DKA/HHS: none  RN: none; 6/10/21 -  PN:  +; was seeing podiatry in the past  Nephropathy: + sees nephrology-- needs appt  Gastroparesis: none  CAD: CABG around 2016  Diabetes complications: CKD stage III, s/p sleeve gastrectomy   Current regimen:  Vgo 20 4 clicks with oatmeal and 3 clicks with breakfast; 4 clicks with lunch and dinner; 1-2 clicks with a snack; sometimes more for a snack   He is not running out of clicks at the end of the day.    He is sometimes taking Vgo off overnight to prevent hypoglycemia. States he is sometimes having hypoglycemia between 1-4AM. States he started doing this a couple of months ago.Takes off around 9-10PM and wakes up at 4-5AM and puts new Vgo on at 6AM. He is eating around 5-6 AM and puts on Vgo after he eats. In the past he was wearing Vgo for over 24 hours, still doing this.    He is fearful of hypoglycemia and overcorrecting.   Missed doses-missing clicks as above   Other medications tried:  Metformin   He is checking BG 4 times daily  Wearing Dexcom today. See media tab for report  His blood sugars are elevated after breakfast and throughout the day. He is putting on a new device sometimes after he eats breakfast which contributes to his hyperglycemia. He is having hyperglycemia at times overnight and he will take off Vgo.   He forgetting to click at times or clicking after he eats.  On days that he forgets to click, his blood sugars stay persistently high. His blood sugars come down nicely when he clicks.    He reports Dexcom and Vgo is falling off due to sweating badly.   Fasting:    Patient feels unwell with blood sugars less than 110. He loses ability to function with low blood sugars. His lowest blood sugar has been in the 40s (around Jan 2020). Seen at [facility].    Dietary recall: He is not following diabetic diet. Appetite is good. Does not eat as much as before.   Eats 3 meals a day.   B: 5AM-waffle house or oatmeal or grits  L: 11:30  D: 7PM  Snacks: Grazing during the day  Drinks: tea and water     Exercise - tried to stay active.  Active in the yard, [employer].    Education - last visit: 2021 -EZEQUIEL Palacios/[place]: has    Social History     Tobacco Use   Smoking Status Former Smoker    Packs/day: 2.00    Years: 30.00    Pack years: 60.00    Types: Cigarettes    Quit date: 2005    Years since quittin.4   Smokeless Tobacco Never Used     Past medical History:   Past Medical History:   Diagnosis Date    Alcohol abuse     Anasarca 2019    Anemia     Anticoagulant long-term use     Arthropathy associated with neurological disorder 2015    Atherosclerosis     Charcot foot due to diabetes mellitus     Chronic combined systolic and diastolic heart failure 2019 Left VentricleModerate decreased ejection fraction at 30%. Normal left ventricular cavity size. Normal wall thickness observed. Grade I (mild) left ventricular diastolic dysfunction consistent with impaired relaxation. Normal left atrial pressure. Moderate, global hypokinesis(see wall scoring diagram). Right VentricleNormal cavity size, wall thickness and ejection fraction. Wall motion n    Chronic pancreatitis 2019    CKD (chronic kidney disease) stage 3, GFR 30-59 ml/min     CKD (chronic kidney disease) stage 4, GFR 15-29 ml/min     Colon polyps     approx 5 yrs ago    Coronary artery disease due to calcified coronary lesion 2015    5 stents on ASA      Diabetic polyneuropathy associated with type 2 diabetes mellitus 2015    Diverticulosis 2019    DM type 2 with diabetic peripheral neuropathy 2019    Encounter for blood transfusion     Essential hypertension 2019    Former smoker 2015    Healed ulcer of left foot on examination 2017    Hematuria     Hydrocele     approx 1.5 yrs ago    Hyperphosphatemia     Hypoalbuminemia 2019    Hypocalcemia     Lymphedema of both lower extremities 2019    Mixed hyperlipidemia 2015    Morbid  obesity with BMI of 50.0-59.9, adult 5/8/2015    Obstruction of right ureteropelvic junction (UPJ) due to stone 5/24/2021    Onychomycosis of multiple toenails with type 2 diabetes mellitus and peripheral neuropathy 6/20/2017    Perianal cyst     approx 2 yrs ago    Proteinuria     Pseudocyst of pancreas 1/28/2019 1-28-19 Liver has a cirrhotic morphology with no focal lesions.  Significant interval increase in ascites when compared to prior exam which may account for patient's abdominal distension.  Hypodense air-fluid collection along the body of the pancreas which is slightly smaller when compared to prior CT.  Findings may relate to pancreatic necrosis with pancreatic pseudocysts with infected pseudocyst    Skin cancer     skin cancer    Sleep apnea 8/26/2015    Status post bariatric surgery 1/11/2016    Type 2 diabetes mellitus, with long-term current use of insulin 5/8/2015    Urinary tract infection       Family hx:   Family History   Problem Relation Age of Onset    Cancer Mother     Cancer Father     Heart disease Father     Obesity Sister     Parkinsonism Brother     No Known Problems Paternal Grandmother     Cancer Paternal Grandfather     Cancer Brother     Diabetes Maternal Grandmother     Stroke Maternal Grandfather     Cirrhosis Neg Hx       Current meds:   Current Outpatient Medications:     amoxicillin-clavulanate 875-125mg (AUGMENTIN) 875-125 mg per tablet, Take 1 tablet by mouth 2 (two) times daily., Disp: , Rfl:     rosuvastatin (CRESTOR) 5 MG tablet, TAKE 1 TABLET BY MOUTH EVERY DAY, Disp: 90 tablet, Rfl: 3    tamsulosin (FLOMAX) 0.4 mg Cap, Take 1 capsule (0.4 mg total) by mouth once daily., Disp: 30 capsule, Rfl: 1    apixaban (ELIQUIS) 5 mg Tab, Take 1 tablet (5 mg total) by mouth 2 (two) times daily. (Patient not taking: Reported on 6/29/2022), Disp: 60 tablet, Rfl: 11    aspirin (ECOTRIN) 81 MG EC tablet, Take 1 tablet (81 mg total) by mouth once daily., Disp: 9  tablet, Rfl: 0    blood-glucose sensor (DEXCOM G6 SENSOR) Sandi, 9 each by Misc.(Non-Drug; Combo Route) route continuous., Disp: 3 each, Rfl: PRN    blood-glucose transmitter (DEXCOM G6 TRANSMITTER) Sandi, 3 each by Misc.(Non-Drug; Combo Route) route continuous., Disp: 3 each, Rfl: PRN    bumetanide (BUMEX) 1 MG tablet, Take 1 tablet (1 mg total) by mouth before breakfast AND 0.5 tablets (0.5 mg total) every evening. (Patient not taking: Reported on 6/29/2022), Disp: 45 tablet, Rfl: 11    glucagon, human recombinant, (GLUCAGON EMERGENCY KIT, HUMAN,) 1 mg SolR, Inject 1 mg into the muscle as needed (For severely low sugar). (Patient not taking: Reported on 6/29/2022), Disp: 1 each, Rfl: 2    insulin glargine, TOUJEO, (TOUJEO SOLOSTAR U-300 INSULIN) 300 unit/mL (1.5 mL) InPn pen, Inject 40 Units into the skin once daily. THIS IF FOR EMERGENCY BACK UP INSULIN USE ONLY - IF YOU ARE OFF OF YOUR VGO OR INSULIN PUMP, GIVE THIS ONCE PER DAY., Disp: 5 pen, Rfl: 1    insulin lispro-aabc (LYUMJEV U-100 INSULIN) 100 unit/mL, Inject 80 Units into the skin continuous. USES IN OMNIPOD INSULIN PUMP. USE AS DIRECTED. DO NOT DIRECTLY INJECT., Disp: 40 mL, Rfl: 3    insulin pump cart,cont inf,BT (OMNIPOD DASH PODS, GEN 4,) Crtg, Inject 1 each into the skin every other day. Change pod every 2 days., Disp: 15 each, Rfl: 11     Current Diabetes medications:   VGO 30 patch - (was on a vgo 20) - giving around 4 clicks with meals.   humalog insulin.     Medications Tried and Failed:   Metformin  Hx pancreatitis - cannot use dpp4 or glp1's.     Social:   Lives at home with: wife  Life changes/stressors currently. Did mention his mother in law passed away recently of stroke. Helping clean out her house. Also suffered recent foot and left leg infection.   Diet: not following ADA diet   Meals: 3 per day and snacks.        Breakfast - waffle house special - eggs, biscuit, cheese, grits, hashbrowns.        Lunch - eats out/pastas/rice - red  "bean plates, etc.        Dinner - eats out every night - [employer]        Snacks 1 - 2 times per day.   Exercise: nothing formally - but works in yard/garage.    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     Glucose Monitoring:   Checking sugars 4x/day via Dexcom g6 - see scanned in .   Gets supplies via mail - has [employer].     Standards of care:   Eyes: .: 06/10/2021  Foot exam: : 09/21/2021   Diabetes education: yes in august with ariadna gill.     Vital Signs  /66 (BP Location: Right arm, Patient Position: Sitting, BP Method: Large (Manual))   Pulse 78   Temp 97.5 °F (36.4 °C) (Temporal)   Resp 18   Ht 5' 7" (1.702 m)   Wt 117.6 kg (259 lb 4.2 oz)   SpO2 99%   BMI 40.61 kg/m²     Pertinent Labs:   Hgba1c   Lab Results   Component Value Date    HGBA1C 11.5 (H) 06/22/2022    HGBA1C 8.1 (H) 03/15/2022    HGBA1C 8.6 (H) 12/16/2021     Lipid panel   Lab Results   Component Value Date    CHOL 155 03/15/2022    CHOL 137 12/16/2021    CHOL 138 03/26/2021     Lab Results   Component Value Date    HDL 65 03/15/2022    HDL 55 12/16/2021    HDL 56 03/26/2021     Lab Results   Component Value Date    LDLCALC 77.2 03/15/2022    LDLCALC 70.8 12/16/2021    LDLCALC 64.8 03/26/2021     Lab Results   Component Value Date    TRIG 64 03/15/2022    TRIG 56 12/16/2021    TRIG 86 03/26/2021     Lab Results   Component Value Date    CHOLHDL 41.9 03/15/2022    CHOLHDL 40.1 12/16/2021    CHOLHDL 40.6 03/26/2021      CMP  Glucose   Date Value Ref Range Status   06/22/2022 89 70 - 110 mg/dL Final     BUN   Date Value Ref Range Status   06/22/2022 23 8 - 23 mg/dL Final     Creatinine   Date Value Ref Range Status   06/22/2022 1.8 (H) 0.5 - 1.4 mg/dL Final     eGFR if    Date Value Ref Range Status   06/22/2022 44.0 (A) >60 mL/min/1.73 m^2 Final     eGFR if non    Date Value Ref Range Status   06/22/2022 38.1 (A) >60 mL/min/1.73 m^2 Final     Comment:     Calculation used to " obtain the estimated glomerular filtration  rate (eGFR) is the CKD-EPI equation.        AST   Date Value Ref Range Status   06/22/2022 57 (H) 10 - 40 U/L Final     ALT   Date Value Ref Range Status   06/22/2022 60 (H) 10 - 44 U/L Final     Microalbumin creatinine ratio:   Lab Results   Component Value Date    USHALBCREAT 2502.2 (H) 03/15/2022       Review Of Systems:   Gen: Appetite good, no weight gain or loss, + fatigue, Denies polydipsia.  Skin: Skin is intact and heals well, denies any rashes or hair changes.   EENT: Denies any acute visual disturbances, nor blurred vision.   Resp: Denies SOB or Dyspnea on exertion, denies cough.   Cardiac: Denies chest pain, palpitations, + 2 edema to bilat. Lower ext.   GI: Denies abdominal pain, nausea or vomiting, diarrhea, or constipation.   /GYN: Denies nocturia, nor burning, frequency or pain on urination. + c/o ED still.   MS/Neuro: Denies numbness/ tingling in BLE; Gait unsteady, speech clear  Psych: Denies drug/ETOH abuse, no hx of depression.  Other systems: negative.    Physical Exam:   GENERAL: Well developed, well nourished in appearance.   PSYCH: AAOx3, appropriate mood and affect, pleasant expression, conversant, appears relaxed, well groomed.   EYES: PERRL, Conjunctiva and corneas clear  NECK: Soft and Supple, trachea midline  CHEST: Even, regular, and unlabored respirations  ABDOMEN: Soft, non-tender, non-distended.   VASCULAR: pedal pulses palpable bilaterally, no edema.  NEURO:  cranial nerves II - XII intact   MUSCULOSKELETAL: Good ROM, steady gait.   SKIN: Skin warm, dry, and intact -     Assessment and Plan of Care:     Jian was seen today for diabetes.    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 Change pod every 2 days.  -     insulin lispro-aabc (LYUMJEV U-100 INSULIN) 100  unit/mL; Inject 80 Units into the skin continuous. USES IN OMNIPOD INSULIN PUMP. USE AS DIRECTED. DO NOT DIRECTLY INJECT.  -     Ambulatory referral/consult to Diabetes Education; Future  -     insulin glargine, TOUJEO, (TOUJEO SOLOSTAR U-300 INSULIN) 300 unit/mL (1.5 mL) InPn pen; Inject 40 Units into the skin once daily. THIS IF FOR EMERGENCY BACK UP INSULIN USE ONLY - IF YOU ARE OFF OF YOUR VGO OR INSULIN PUMP, GIVE THIS ONCE PER DAY.    Paroxysmal atrial fibrillation    Hypertension associated with diabetes    Chronic combined systolic and diastolic heart failure    Hyperlipidemia associated with type 2 diabetes mellitus    Stage 3 chronic kidney disease, unspecified whether stage 3a or 3b CKD    Status post bariatric surgery    Type 2 diabetes mellitus with stage 3 chronic kidney disease, with long-term current use of insulin, unspecified whether stage 3a or 3b CKD    Morbid obesity    Hypoalbuminemia       1. T1 - ELOISE - formerly treated as T2DM with hyperglycemia- Hgba1c goal is 7.5% or less without hypoglycemia - 7.6%---> 8.6%--> 8.3%--> 8.6% --> 8.1%  --> 11. 5% current.   discussed DM, progression of disease, long term complications, CV risk factors and tx options.   Advise compliance with ADA diet and encourage exercise-   Continue on VGO 30 - SWITCH TO OMNIPOD DASH.   The vgo keeps falling off - and he doesn't get his insulin.   He's on humalog, I am going to try him on lyumjev to see if this will help with onset of action time and more prandial control.   discussed option of changing to an omnipod - it may be better/more customized for him.  HE HAS OMNIPOD AND PDM, JUST NEEDS TO MEET WITH EDUCATOR TO START.   HE GETS HIS PODS FROM DASH.   He has had pump eval in past and is willing to check on - will order presets for him for ease of use.   Continue on 4 clicks per meals -- increase to 5 clicks - 6 clicks if glucose > 180  On the vgo for now.   Remember to click before the meal, not after. To help  control highs.   Try not to skip clicks.   I sent in rx for back up insulin pens. He has pen needles already.   Reviewed  hypoglycemia, s/s and appropriate tx. Have/get quick acting glucose tablets at hand.   Instructed to monitor Blood glucose 2 - 4x/day and bring meter/ log to every clinic visit.   Continue on dexcom G6 personal cgm.     2. HTN & Heart failure - usually controlled, did not take meds yet today -    continue meds as previously prescribed and monitor.   + urine mac  History of afib.   + history of CAD and hx cabg, as well as venous insufficiency bilat. Lower extremeties.   Last saw cardiology - Dr. Saavedra on 6/14/22.      3. Lipids- LDL goal < 100. At goal.   Currently on statin therapy    4. Weight - BMI Body mass index is 40.61 kg/m².   Encourage Ada diet and exercise as tolerated.   Former weight loss surgery.   Cannot do glp1 b/c of history of pancreatitis.     5. Renal Function - ckd - stage 3 -   + microalbuminuria. No changes.     6,. Recent left leg and foot infection - healing/likely r/t hyperglycemia.    hx venous insufficiency and lymphedema.       Settings to be followed for Omnipod dash start -   Basal rate 1.1 units/hour.   Insulin to carb ratio 1:8 - he does not carb count though.   ISF 40   Max basal rate of 2 units/hour.   bg target of 120   Correct for bg over 180   Preset boluses - he does not carb count  2 units - snack.   4 units small meal.   6 units medium meal.   8 units large meal.     Meet with DE to switch from vgo to omnipod dash.   Presets only - see settings scanned in .   Follow up in 6 weeks after pump transition.

## 2022-06-30 ENCOUNTER — PATIENT MESSAGE (OUTPATIENT)
Dept: UROLOGY | Facility: CLINIC | Age: 67
End: 2022-06-30
Payer: MEDICARE

## 2022-06-30 PROBLEM — N18.30 CHRONIC KIDNEY DISEASE, STAGE 3: Chronic | Status: RESOLVED | Noted: 2019-07-23 | Resolved: 2022-06-30

## 2022-07-01 ENCOUNTER — HOSPITAL ENCOUNTER (OUTPATIENT)
Dept: WOUND CARE | Facility: HOSPITAL | Age: 67
Discharge: HOME | End: 2022-07-01
Attending: STUDENT IN AN ORGANIZED HEALTH CARE EDUCATION/TRAINING PROGRAM
Payer: MEDICARE

## 2022-07-01 VITALS
BODY MASS INDEX: 40.65 KG/M2 | TEMPERATURE: 98 F | WEIGHT: 259 LBS | DIASTOLIC BLOOD PRESSURE: 73 MMHG | HEIGHT: 67 IN | SYSTOLIC BLOOD PRESSURE: 157 MMHG | HEART RATE: 76 BPM

## 2022-07-01 DIAGNOSIS — E08.621 DIABETIC ULCER OF LEFT FOOT ASSOCIATED WITH DIABETES MELLITUS DUE TO UNDERLYING CONDITION, UNSPECIFIED PART OF FOOT, UNSPECIFIED ULCER STAGE: Primary | ICD-10-CM

## 2022-07-01 DIAGNOSIS — L97.529 DIABETIC ULCER OF LEFT FOOT ASSOCIATED WITH DIABETES MELLITUS DUE TO UNDERLYING CONDITION, UNSPECIFIED PART OF FOOT, UNSPECIFIED ULCER STAGE: Primary | ICD-10-CM

## 2022-07-01 DIAGNOSIS — I89.0 LYMPHEDEMA OF BOTH LOWER EXTREMITIES: ICD-10-CM

## 2022-07-01 PROCEDURE — 97597 DBRDMT OPN WND 1ST 20 CM/<: CPT

## 2022-07-01 PROCEDURE — 11042 DBRDMT SUBQ TIS 1ST 20SQCM/<: CPT

## 2022-07-01 PROCEDURE — 97597 DBRDMT OPN WND 1ST 20 CM/<: CPT | Mod: 59,,, | Performed by: STUDENT IN AN ORGANIZED HEALTH CARE EDUCATION/TRAINING PROGRAM

## 2022-07-01 PROCEDURE — 11042 DBRDMT SUBQ TIS 1ST 20SQCM/<: CPT | Mod: ,,, | Performed by: STUDENT IN AN ORGANIZED HEALTH CARE EDUCATION/TRAINING PROGRAM

## 2022-07-01 PROCEDURE — 99214 OFFICE O/P EST MOD 30 MIN: CPT | Mod: 25,,, | Performed by: STUDENT IN AN ORGANIZED HEALTH CARE EDUCATION/TRAINING PROGRAM

## 2022-07-01 PROCEDURE — 11042 DEBRIDEMENT: ICD-10-PCS | Mod: ,,, | Performed by: STUDENT IN AN ORGANIZED HEALTH CARE EDUCATION/TRAINING PROGRAM

## 2022-07-01 PROCEDURE — 99214 PR OFFICE/OUTPT VISIT, EST, LEVL IV, 30-39 MIN: ICD-10-PCS | Mod: 25,,, | Performed by: STUDENT IN AN ORGANIZED HEALTH CARE EDUCATION/TRAINING PROGRAM

## 2022-07-01 PROCEDURE — 97597 DEBRIDEMENT: ICD-10-PCS | Mod: 59,,, | Performed by: STUDENT IN AN ORGANIZED HEALTH CARE EDUCATION/TRAINING PROGRAM

## 2022-07-01 NOTE — PROGRESS NOTES
[facility]    Subjective:       Patient ID: Jian Arrieta is a 66 y.o. male.    Chief Complaint: Diabetic Foot Ulcer and Non-healing Wound Follow Up    7/1/22 Patient seen for assessment and evaluation of LLE wounds.  Plantar wound with intact callus removed by MD.  Left lateral wound was debrided resulting in 2 separete wounds.  Tolerated dressing changes and debridement well.    Review of Systems   Constitutional: Negative for activity change, chills, diaphoresis and fever.   HENT: Negative for congestion and hearing loss.    Respiratory: Negative for cough and shortness of breath.    Cardiovascular: Positive for leg swelling.   Gastrointestinal: Negative for nausea and vomiting.   Skin: Positive for wound. Negative for color change, pallor and rash.   Neurological: Positive for numbness. Negative for tremors, speech difficulty and weakness.   Psychiatric/Behavioral: Negative for agitation and confusion. The patient is not nervous/anxious.          Objective:        Physical Exam  Constitutional:       General: He is not in acute distress.     Appearance: Normal appearance. He is well-developed. He is not diaphoretic.   Cardiovascular:      Comments: Dorsalis pedis and posterior tibial pulses are mildly diminished. Skin temperature is within normal limits. Toes are cool to touch and feet are warm proximally. Hair growth is diminished. Skin is atrophic and with mild hyperpigmentation. Pitting edema noted, bilaterally.   Musculoskeletal:         General: No tenderness.      Comments: Adequate joint range of motion without pain, limitation, nor crepitation to foot and ankle joints. Muscle strength is 5/5 in all groups bilaterally.       Feet:      Right foot:      Skin integrity: Dry skin present. No ulcer, blister, erythema or warmth.      Left foot:      Skin integrity: Dry skin present. No ulcer, blister, erythema or warmth.   Skin:     General: Skin is warm and  dry.      Capillary Refill: Capillary refill takes less than 2 seconds.      Comments: Skin is warm and dry, no acute signs of infection noted bilaterally      See wound description below    Toenails are thickened by 2-4 mm's, dystrophic, and are darkened in coloration with subungual fungal debris.     Otherwise, no open wounds, macerations or hyperkeratotic lesions, bilaterally            Neurological:      Mental Status: He is alert and oriented to person, place, and time.      Sensory: Sensory deficit present.      Motor: No abnormal muscle tone.      Comments: Light touch within normal limits.    Psychiatric:         Mood and Affect: Mood normal.         Behavior: Behavior normal.         Thought Content: Thought content normal.            Wound 06/10/22 0900 Abscess Left anterior;lower Leg (Active)   06/10/22 0900    Pre-existing: Yes   Primary Wound Type: Abscess   Side: Left   Orientation: anterior;lower   Location: Leg   Wound Number:    Ankle-Brachial Index:    Pulses:    Removal Indication and Assessment:    Wound Outcome:    (Retired) Wound Type:    (Retired) Wound Length (cm):    (Retired) Wound Width (cm):    (Retired) Depth (cm):    Wound Description (Comments):    Removal Indications:    Wound Image    07/01/22 1141   Drainage Amount Small 07/01/22 1141   Drainage Characteristics/Odor Serous 07/01/22 1141   Appearance Pink 07/01/22 1141   Tissue loss description Full thickness 07/01/22 1141   Red (%), Wound Tissue Color 100 % 07/01/22 1141   Periwound Area Intact;Dry;Edematous 07/01/22 1141   Wound Edges Defined;Open 07/01/22 1141   Wound Length (cm) 1.5 cm 07/01/22 1141   Wound Width (cm) 0.6 cm 07/01/22 1141   Wound Depth (cm) 0.1 cm 07/01/22 1141   Wound Volume (cm^3) 0.09 cm^3 07/01/22 1141   Wound Surface Area (cm^2) 0.9 cm^2 07/01/22 1141   Care Cleansed with:;Debrided;Soap and water;Sterile normal saline 07/01/22 1141   Dressing Applied;Hydrofiber 07/01/22 1141   Compression Three layer  compression 07/01/22 1141   Dressing Change Due 07/08/22 07/01/22 1141       [REMOVED]      Altered Skin Integrity 06/10/22 0900 Left plantar Foot #1 Traumatic (Removed)   06/10/22 0900   Altered Skin Integrity Present on Admission:    Side: Left   Orientation: plantar   Location: Foot   Wound Number: #1   Is this injury device related?:    Primary Wound Type: Traumatic   Description of Altered Skin Integrity:    Removal Indication and Assessment:    Wound Outcome: Healed   (Retired) Wound Length (cm):    (Retired) Wound Width (cm):    (Retired) Depth (cm):    Wound Description (Comments):    Removal Indications:    Removed 07/01/22 1040   Wound Image   07/01/22 1141   Description of Altered Skin Integrity Intact skin with non-blanchable redness of localized area 07/01/22 1141   Dressing Appearance Open to air;No dressing 07/01/22 1141   Drainage Amount None 07/01/22 1141   Periwound Area Dry;Intact 07/01/22 1141   Wound Edges Callused 07/01/22 1141   Wound Length (cm) 0 cm 07/01/22 1141   Wound Width (cm) 0 cm 07/01/22 1141   Wound Depth (cm) 0 cm 07/01/22 1141   Wound Volume (cm^3) 0 cm^3 07/01/22 1141   Wound Surface Area (cm^2) 0 cm^2 07/01/22 1141         Assessment:         ICD-10-CM ICD-9-CM   1. Ulcer of left foot with bone involvement without evidence of necrosis  L97.526 707.15   2. Lymphedema of both lower extremities  I89.0 457.1   3. Cellulitis and abscess of leg  L03.119 682.6    L02.419          Plan:   Tissue pathology and/or culture taken:  [] Yes [x] No   Sharp debridement performed:   [x] Yes [] No   Labs ordered this visit:   [] Yes [x] No   Imaging ordered this visit:   [] Yes [x] No           Orders Placed This Encounter   Procedures    Change dressing     Left lateral leg     Cleanse wound with:Normal Saline   Lidocaine:PRN   Silver nitrate:PRN   Periwound care:Prn  Primary dressing:Hydrofera Ready   Edema control: Elevate BLE as much as possible. Profore lite LLE toe to knee. Tubi  F RLE    Frequency: Twice weekly in clinic   Follow-up: Nurse visits until seen by MD. Castro in 2 weeks, 7/15/22.      Wound care per above  Left foot wound is healed. Continue plan of care for left leg wound  Rest, elevate  Follow up with Cardiology for venous stasis  Shoe inspection. Diabetic Foot Education. Patient reminded of the importance of good nutrition and blood sugar control to help prevent podiatric complications of diabetes. Patient instructed on proper foot hygeine. We discussed wearing proper shoe gear, daily foot inspections, never walking without protective shoe gear, never putting sharp instruments to feet, routine podiatric nail visits    Follow up in about 2 weeks (around 7/15/2022) for Dr Felton.

## 2022-07-07 NOTE — PROCEDURES
Debridement    Date/Time: 7/1/2022 10:00 AM  Performed by: Savanah Felton DPM  Authorized by: Savanah Felton DPM     Consent Done?:  Yes (Verbal)  Local anesthesia used?: No      Wound Details:    Location:  Left leg    Type of Debridement:  Excisional (proximal wound)       Length (cm):  1.5       Area (sq cm):  0.9       Width (cm):  0.6       Percent Debrided (%):  100       Depth (cm):  0.1       Total Area Debrided (sq cm):  0.9    Depth of debridement:  Subcutaneous tissue    Tissue debrided:  Subcutaneous and Other    Devitalized tissue debrided:  Biofilm, Callus and Fibrin    Instruments:  Blade and Curette    3rd Wound Details:     Location:  Left foot    Location:  Left Plantar    Location:  Left Plantar    Type of Debridement:  Non-excisional       Length (cm):  1       Area (sq cm):  1       Width (cm):  1       Percent Debrided (%):  100       Depth (cm):  0       Total Area Debrided (sq cm):  1    Depth of debridement:  Epidermis/Dermis    Tissue debrided:  Epidermis    Devitalized tissue debrided:  Callus    Instruments:  Blade    Bleeding:  None  Patient tolerance:  Patient tolerated the procedure well with no immediate complications     No cultures were taken during this visit . Left plantar foot wound is healed

## 2022-07-08 ENCOUNTER — HOSPITAL ENCOUNTER (OUTPATIENT)
Dept: WOUND CARE | Facility: HOSPITAL | Age: 67
Discharge: HOME | End: 2022-07-08
Attending: STUDENT IN AN ORGANIZED HEALTH CARE EDUCATION/TRAINING PROGRAM
Payer: MEDICARE

## 2022-07-08 DIAGNOSIS — I89.0 LYMPHEDEMA OF BOTH LOWER EXTREMITIES: Primary | ICD-10-CM

## 2022-07-08 DIAGNOSIS — I87.2 VENOUS INSUFFICIENCY OF BOTH LOWER EXTREMITIES: ICD-10-CM

## 2022-07-08 PROCEDURE — 99213 OFFICE O/P EST LOW 20 MIN: CPT

## 2022-07-08 PROCEDURE — 29581 APPL MULTLAYER CMPRN SYS LEG: CPT

## 2022-07-08 NOTE — PROGRESS NOTES
[facility]    Subjective:       Patient ID: Jian Arrieta is a 66 y.o. male.    Chief Complaint: No chief complaint on file.    7/8/22: Nurse visit for dressing change. Continued with POC.    Review of Systems      Objective:        Physical Exam       Wound 06/10/22 0900 Abscess Left anterior;lower Leg (Active)   06/10/22 0900    Pre-existing: Yes   Primary Wound Type: Abscess   Side: Left   Orientation: anterior;lower   Location: Leg   Wound Number:    Ankle-Brachial Index:    Pulses:    Removal Indication and Assessment:    Wound Outcome:    (Retired) Wound Type:    (Retired) Wound Length (cm):    (Retired) Wound Width (cm):    (Retired) Depth (cm):    Wound Description (Comments):    Removal Indications:    Dressing Appearance Intact;Moist drainage 07/08/22 1100   Drainage Amount Small 07/08/22 1100   Drainage Characteristics/Odor Serous 07/08/22 1100   Appearance Pink 07/08/22 1100   Tissue loss description Full thickness 07/08/22 1100   Red (%), Wound Tissue Color 100 % 07/08/22 1100   Periwound Area Intact;Dry;Edematous 07/08/22 1100   Wound Edges Defined;Open 07/08/22 1100   Wound Length (cm) 1.5 cm 07/08/22 1100   Wound Width (cm) 0.6 cm 07/08/22 1100   Wound Depth (cm) 0.1 cm 07/08/22 1100   Wound Volume (cm^3) 0.09 cm^3 07/08/22 1100   Wound Surface Area (cm^2) 0.9 cm^2 07/08/22 1100   Care Cleansed with:;Soap and water;Sterile normal saline 07/08/22 1100   Dressing Applied;Hydrofiber;Compression wrap 07/08/22 1100   Compression Three layer compression 07/08/22 1100   Dressing Change Due 07/15/22 07/08/22 1100         Assessment:         ICD-10-CM ICD-9-CM   1. Lymphedema of both lower extremities  I89.0 457.1   2. Venous insufficiency of both lower extremities  I87.2 459.81         Plan:   Tissue pathology and/or culture taken:  [] Yes [x] No   Sharp debridement performed:   [] Yes [x] No   Labs ordered this visit:   [] Yes [x] No   Imaging ordered this  visit:   [] Yes [x] No     Left lateral leg     Cleanse wound with:Normal Saline   Lidocaine:PRN   Silver nitrate:PRN   Periwound care:Prn   Primary dressing:Hydrofera Ready   Edema control: Elevate BLE as much as possible. Profore lite LLE toe to knee. Tubi  F RLE   Frequency: Twice weekly in clinic   Follow-up: Nurse visits until seen by MD. Castro in 2 weeks, 7/15/22.      No orders of the defined types were placed in this encounter.       Follow up in about 1 week (around 7/15/2022) for .

## 2022-07-12 ENCOUNTER — HOSPITAL ENCOUNTER (OUTPATIENT)
Dept: WOUND CARE | Facility: HOSPITAL | Age: 67
Discharge: HOME | End: 2022-07-12
Attending: STUDENT IN AN ORGANIZED HEALTH CARE EDUCATION/TRAINING PROGRAM
Payer: MEDICARE

## 2022-07-12 DIAGNOSIS — I87.2 VENOUS INSUFFICIENCY OF BOTH LOWER EXTREMITIES: Primary | ICD-10-CM

## 2022-07-12 PROCEDURE — 29581 APPL MULTLAYER CMPRN SYS LEG: CPT

## 2022-07-12 NOTE — PROGRESS NOTES
[facility]    Subjective:       Patient ID: Jian Arrieta is a 66 y.o. male.    Chief Complaint: Venous Stasis (Left leg)    7/12/22: Nurse visit for dressing change. No open areas noted today.    Review of Systems      Objective:        Physical Exam       Wound 06/10/22 0900 Abscess Left anterior;lower Leg (Active)   06/10/22 0900    Pre-existing: Yes   Primary Wound Type: Abscess   Side: Left   Orientation: anterior;lower   Location: Leg   Wound Number:    Ankle-Brachial Index:    Pulses:    Removal Indication and Assessment:    Wound Outcome:    (Retired) Wound Type:    (Retired) Wound Length (cm):    (Retired) Wound Width (cm):    (Retired) Depth (cm):    Wound Description (Comments):    Removal Indications:    Dressing Appearance Intact;Dry 07/12/22 1100   Drainage Amount None 07/12/22 1100   Appearance Pink;Closed/resurfaced 07/12/22 1100   Tissue loss description Not applicable 07/12/22 1100   Red (%), Wound Tissue Color 100 % 07/12/22 1100   Periwound Area Edematous;Dry 07/12/22 1100   Wound Edges Rolled/closed 07/12/22 1100   Wound Length (cm) 0 cm 07/12/22 1100   Wound Width (cm) 0 cm 07/12/22 1100   Wound Depth (cm) 0 cm 07/12/22 1100   Wound Volume (cm^3) 0 cm^3 07/12/22 1100   Wound Surface Area (cm^2) 0 cm^2 07/12/22 1100   Care Cleansed with:;Soap and water 07/12/22 1100   Dressing Applied;Compression wrap 07/12/22 1100   Periwound Care Moisturizer applied 07/12/22 1100   Compression Three layer compression 07/12/22 1100   Dressing Change Due 07/15/22 07/12/22 1100         Assessment:         ICD-10-CM ICD-9-CM   1. Venous insufficiency of both lower extremities  I87.2 459.81         Plan:   Tissue pathology and/or culture taken:  [] Yes [x] No   Sharp debridement performed:   [] Yes [x] No   Labs ordered this visit:   [] Yes [x] No   Imaging ordered this visit:   [] Yes [x] No     Left lateral leg     Cleanse wound with:Normal Saline   Lidocaine:PRN    Silver nitrate:PRN   Periwound care:Prn   Primary dressing:Hydrofera Ready   Edema control: Elevate BLE as much as possible. Profore lite LLE toe to knee. Tubi  F RLE   Frequency: Twice weekly in clinic   Follow-up: Nurse visits until seen by MD. Castro in 2 weeks, 7/15/22.      No orders of the defined types were placed in this encounter.       Follow up in about 3 days (around 7/15/2022).

## 2022-07-15 ENCOUNTER — HOSPITAL ENCOUNTER (OUTPATIENT)
Dept: WOUND CARE | Facility: HOSPITAL | Age: 67
Discharge: HOME | End: 2022-07-15
Attending: STUDENT IN AN ORGANIZED HEALTH CARE EDUCATION/TRAINING PROGRAM
Payer: MEDICARE

## 2022-07-15 VITALS
SYSTOLIC BLOOD PRESSURE: 141 MMHG | BODY MASS INDEX: 40.65 KG/M2 | WEIGHT: 259 LBS | TEMPERATURE: 98 F | DIASTOLIC BLOOD PRESSURE: 69 MMHG | HEIGHT: 67 IN | HEART RATE: 71 BPM

## 2022-07-15 DIAGNOSIS — I87.2 VENOUS INSUFFICIENCY OF BOTH LOWER EXTREMITIES: Primary | ICD-10-CM

## 2022-07-15 DIAGNOSIS — S81.812A SKIN TEAR OF LEFT LOWER LEG WITHOUT COMPLICATION, INITIAL ENCOUNTER: ICD-10-CM

## 2022-07-15 PROCEDURE — 99214 OFFICE O/P EST MOD 30 MIN: CPT | Mod: ,,, | Performed by: STUDENT IN AN ORGANIZED HEALTH CARE EDUCATION/TRAINING PROGRAM

## 2022-07-15 PROCEDURE — 99214 PR OFFICE/OUTPT VISIT, EST, LEVL IV, 30-39 MIN: ICD-10-PCS | Mod: ,,, | Performed by: STUDENT IN AN ORGANIZED HEALTH CARE EDUCATION/TRAINING PROGRAM

## 2022-07-15 PROCEDURE — 29581 APPL MULTLAYER CMPRN SYS LEG: CPT

## 2022-07-15 NOTE — PROGRESS NOTES
[facility]    Subjective:       Patient ID: Jian Arrieta is a 66 y.o. male.    Chief Complaint: Wound Care (Left leg skin tear)    7/15/22: F/U with Dr. Felton. LLE venous sites resolved. Patient has new skin tear to left lower leg. States he removed wrap last night to take a bath then hit leg on a box in his garage. Steri strips to wound, xeroform, abd, and Profore lite LLE toes to knee. Return weekly for dressing change. Continue Tubigrip F RLE toes to knee.    Review of Systems   Constitutional: Negative for activity change, chills, diaphoresis and fever.   HENT: Negative for congestion and hearing loss.    Respiratory: Negative for cough and shortness of breath.    Cardiovascular: Positive for leg swelling.   Gastrointestinal: Negative for nausea and vomiting.   Skin: Positive for wound. Negative for color change, pallor and rash.   Neurological: Positive for numbness. Negative for tremors, speech difficulty and weakness.   Psychiatric/Behavioral: Negative for agitation and confusion. The patient is not nervous/anxious.          Objective:        Physical Exam  Constitutional:       General: He is not in acute distress.     Appearance: Normal appearance. He is well-developed. He is not diaphoretic.   Cardiovascular:      Comments: Dorsalis pedis and posterior tibial pulses are mildly diminished. Skin temperature is within normal limits. Toes are cool to touch and feet are warm proximally. Hair growth is diminished. Skin is atrophic and with mild hyperpigmentation. Pitting edema noted, bilaterally.   Musculoskeletal:         General: No tenderness.      Comments: Adequate joint range of motion without pain, limitation, nor crepitation to foot and ankle joints. Muscle strength is 5/5 in all groups bilaterally.       Feet:      Right foot:      Skin integrity: Dry skin present. No ulcer, blister, erythema or warmth.      Left foot:      Skin integrity: Dry skin  present. No ulcer, blister, erythema or warmth.   Skin:     General: Skin is warm and dry.      Capillary Refill: Capillary refill takes less than 2 seconds.      Comments: Skin is warm and dry, no acute signs of infection noted bilaterally      See wound description below    Toenails are thickened by 2-4 mm's, dystrophic, and are darkened in coloration with subungual fungal debris.     Otherwise, no open wounds, macerations or hyperkeratotic lesions, bilaterally            Neurological:      Mental Status: He is alert and oriented to person, place, and time.      Sensory: Sensory deficit present.      Motor: No abnormal muscle tone.      Comments: Light touch within normal limits.    Psychiatric:         Mood and Affect: Mood normal.         Behavior: Behavior normal.         Thought Content: Thought content normal.            Wound 07/15/22 0900 Skin Tear Left lower Leg (Active)   07/15/22 0900    Pre-existing: Yes   Primary Wound Type: Skin tear   Side: Left   Orientation: lower   Location: Leg   Wound Number:    Ankle-Brachial Index:    Pulses:    Removal Indication and Assessment:    Wound Outcome:    (Retired) Wound Type:    (Retired) Wound Length (cm):    (Retired) Wound Width (cm):    (Retired) Depth (cm):    Wound Description (Comments):    Removal Indications:    Wound Image   07/15/22 1000   Dressing Appearance Moist drainage 07/15/22 1000   Drainage Amount Moderate 07/15/22 1000   Drainage Characteristics/Odor Serosanguineous 07/15/22 1000   Appearance Red;Moist;Adipose 07/15/22 1000   Tissue loss description Partial thickness 07/15/22 1000   Red (%), Wound Tissue Color 100 % 07/15/22 1000   Periwound Area Edematous 07/15/22 1000   Wound Edges Irregular 07/15/22 1000   Wound Length (cm) 4 cm 07/15/22 1000   Wound Width (cm) 3 cm 07/15/22 1000   Wound Depth (cm) 0.3 cm 07/15/22 1000   Wound Volume (cm^3) 3.6 cm^3 07/15/22 1000   Wound Surface Area (cm^2) 12 cm^2 07/15/22 1000   Care Cleansed with:;Sterile  normal saline;Applied:;Steri-strips 07/15/22 1000   Dressing Applied;Non-adherent;Absorptive Pad;Compression wrap 07/15/22 1000   Periwound Care Skin barrier film applied 07/15/22 1000   Compression Three layer compression 07/15/22 1000   Dressing Change Due 07/19/22 07/15/22 1000         Assessment:         ICD-10-CM ICD-9-CM   1. Venous insufficiency of both lower extremities  I87.2 459.81   2. Skin tear of left lower leg without complication, initial encounter  S81.812A 891.0         Plan:   Tissue pathology and/or culture taken:  [] Yes [x] No   Sharp debridement performed:   [] Yes [x] No   Labs ordered this visit:   [] Yes [x] No   Imaging ordered this visit:   [] Yes [x] No           Orders Placed This Encounter   Procedures    Change dressing     Left lower leg skin tear:  Cleanse with: Normal saline  Lidocaine: prn  Silver nitrate: prn  Periwound care: Benzoin  Primary dressing: Steri strips, xeroform, small ABD  Secondary dressing: Profore lite LLE toes to knee  Edema control: Profore lite LLE, Tubigrip F RLE  Frequency: weekly  Follow-up: Dr. Felton 7/22/22        New wound noted. Wound care per above  Left foot wound is healed. Continue plan of care for left leg wound  Rest, elevate  Follow up with Cardiology for venous stasis  Shoe inspection. Diabetic Foot Education. Patient reminded of the importance of good nutrition and blood sugar control to help prevent podiatric complications of diabetes. Patient instructed on proper foot hygeine. We discussed wearing proper shoe gear, daily foot inspections, never walking without protective shoe gear, never putting sharp instruments to feet, routine podiatric nail visits      Follow up in about 4 days (around 7/19/2022).

## 2022-07-19 ENCOUNTER — HOSPITAL ENCOUNTER (OUTPATIENT)
Dept: WOUND CARE | Facility: HOSPITAL | Age: 67
Discharge: HOME | End: 2022-07-19
Attending: STUDENT IN AN ORGANIZED HEALTH CARE EDUCATION/TRAINING PROGRAM
Payer: MEDICARE

## 2022-07-19 PROCEDURE — 99212 OFFICE O/P EST SF 10 MIN: CPT

## 2022-07-19 NOTE — PROGRESS NOTES
[facility]    Subjective:       Patient ID: Jian Arrieta is a 66 y.o. male.    Chief Complaint: Wound Care    Nurse visit for dressing change      Review of Systems      Objective:        Physical Exam       Wound 07/15/22 0900 Skin Tear Left lower Leg (Active)   07/15/22 0900    Pre-existing: Yes   Primary Wound Type: Skin tear   Side: Left   Orientation: lower   Location: Leg   Wound Number:    Ankle-Brachial Index:    Pulses:    Removal Indication and Assessment:    Wound Outcome:    (Retired) Wound Type:    (Retired) Wound Length (cm):    (Retired) Wound Width (cm):    (Retired) Depth (cm):    Wound Description (Comments):    Removal Indications:    Dressing Appearance Intact;Moist drainage 07/19/22 1000   Drainage Amount Small 07/19/22 1000   Drainage Characteristics/Odor Serosanguineous 07/19/22 1000   Appearance Intact;Red;Moist 07/19/22 1000   Tissue loss description Partial thickness 07/19/22 1000   Red (%), Wound Tissue Color 100 % 07/19/22 1000   Periwound Area Edematous 07/19/22 1000   Wound Edges Irregular 07/19/22 1000   Wound Length (cm) 4 cm 07/19/22 1000   Wound Width (cm) 3 cm 07/19/22 1000   Wound Depth (cm) 0.3 cm 07/19/22 1000   Wound Volume (cm^3) 3.6 cm^3 07/19/22 1000   Wound Surface Area (cm^2) 12 cm^2 07/19/22 1000   Care Cleansed with:;Soap and water;Sterile normal saline 07/19/22 1000   Dressing Applied;Non-adherent;Island/border;Tubular bandage 07/19/22 1000   Periwound Care Skin barrier film applied 07/19/22 1000   Compression Tubular elasticized bandage 07/19/22 1000   Dressing Change Due 07/22/22 07/19/22 1000         Assessment:       No diagnosis found.      Plan:   Tissue pathology and/or culture taken:  [] Yes [x] No   Sharp debridement performed:   [] Yes [x] No   Labs ordered this visit:   [] Yes [x] No   Imaging ordered this visit:   [] Yes [x] No         Left lower leg skin tear:   Cleanse with: Normal saline   Lidocaine: prn    Silver nitrate: prn   Periwound care: Benzoin   Primary dressing: Steri strips, xeroform, small ABD   Secondary dressing: Profore lite LLE toes to knee   Edema control: Profore lite LLE, Tubigrip F RLE   Frequency: weekly   Follow-up: Dr. Felton 7/22/22       Follow up in about 1 week (around 7/26/2022).

## 2022-07-20 ENCOUNTER — PATIENT OUTREACH (OUTPATIENT)
Dept: ADMINISTRATIVE | Facility: HOSPITAL | Age: 67
End: 2022-07-20
Payer: MEDICARE

## 2022-07-25 ENCOUNTER — OFFICE VISIT (OUTPATIENT)
Dept: UROLOGY | Facility: CLINIC | Age: 67
End: 2022-07-25
Payer: MEDICARE

## 2022-07-25 VITALS
DIASTOLIC BLOOD PRESSURE: 69 MMHG | WEIGHT: 250 LBS | HEIGHT: 67 IN | SYSTOLIC BLOOD PRESSURE: 136 MMHG | HEART RATE: 74 BPM | BODY MASS INDEX: 39.24 KG/M2

## 2022-07-25 DIAGNOSIS — N22 CALCULUS OF URINARY TRACT IN DISEASES CLASSIFIED ELSEWHERE: ICD-10-CM

## 2022-07-25 DIAGNOSIS — N20.0 BILATERAL KIDNEY STONES: ICD-10-CM

## 2022-07-25 DIAGNOSIS — E11.22 TYPE 2 DIABETES MELLITUS WITH STAGE 3 CHRONIC KIDNEY DISEASE, WITH LONG-TERM CURRENT USE OF INSULIN, UNSPECIFIED WHETHER STAGE 3A OR 3B CKD: Primary | ICD-10-CM

## 2022-07-25 DIAGNOSIS — Z79.4 TYPE 2 DIABETES MELLITUS WITH STAGE 3 CHRONIC KIDNEY DISEASE, WITH LONG-TERM CURRENT USE OF INSULIN, UNSPECIFIED WHETHER STAGE 3A OR 3B CKD: Primary | ICD-10-CM

## 2022-07-25 DIAGNOSIS — N18.30 TYPE 2 DIABETES MELLITUS WITH STAGE 3 CHRONIC KIDNEY DISEASE, WITH LONG-TERM CURRENT USE OF INSULIN, UNSPECIFIED WHETHER STAGE 3A OR 3B CKD: Primary | ICD-10-CM

## 2022-07-25 PROCEDURE — 3008F BODY MASS INDEX DOCD: CPT | Mod: CPTII,S$GLB,, | Performed by: UROLOGY

## 2022-07-25 PROCEDURE — 99214 OFFICE O/P EST MOD 30 MIN: CPT | Mod: S$GLB,,, | Performed by: UROLOGY

## 2022-07-25 PROCEDURE — 3075F SYST BP GE 130 - 139MM HG: CPT | Mod: CPTII,S$GLB,, | Performed by: UROLOGY

## 2022-07-25 PROCEDURE — 1159F MED LIST DOCD IN RCRD: CPT | Mod: CPTII,S$GLB,, | Performed by: UROLOGY

## 2022-07-25 PROCEDURE — 3046F PR MOST RECENT HEMOGLOBIN A1C LEVEL > 9.0%: ICD-10-PCS | Mod: CPTII,S$GLB,, | Performed by: UROLOGY

## 2022-07-25 PROCEDURE — 1159F PR MEDICATION LIST DOCUMENTED IN MEDICAL RECORD: ICD-10-PCS | Mod: CPTII,S$GLB,, | Performed by: UROLOGY

## 2022-07-25 PROCEDURE — 1101F PR PT FALLS ASSESS DOC 0-1 FALLS W/OUT INJ PAST YR: ICD-10-PCS | Mod: CPTII,S$GLB,, | Performed by: UROLOGY

## 2022-07-25 PROCEDURE — 3062F PR POS MACROALBUMINURIA RESULT DOCUMENTED/REVIEW: ICD-10-PCS | Mod: CPTII,S$GLB,, | Performed by: UROLOGY

## 2022-07-25 PROCEDURE — 99999 PR PBB SHADOW E&M-EST. PATIENT-LVL III: CPT | Mod: PBBFAC,,, | Performed by: UROLOGY

## 2022-07-25 PROCEDURE — 3046F HEMOGLOBIN A1C LEVEL >9.0%: CPT | Mod: CPTII,S$GLB,, | Performed by: UROLOGY

## 2022-07-25 PROCEDURE — 1101F PT FALLS ASSESS-DOCD LE1/YR: CPT | Mod: CPTII,S$GLB,, | Performed by: UROLOGY

## 2022-07-25 PROCEDURE — 3066F NEPHROPATHY DOC TX: CPT | Mod: CPTII,S$GLB,, | Performed by: UROLOGY

## 2022-07-25 PROCEDURE — 1125F PR PAIN SEVERITY QUANTIFIED, PAIN PRESENT: ICD-10-PCS | Mod: CPTII,S$GLB,, | Performed by: UROLOGY

## 2022-07-25 PROCEDURE — 99214 PR OFFICE/OUTPT VISIT, EST, LEVL IV, 30-39 MIN: ICD-10-PCS | Mod: S$GLB,,, | Performed by: UROLOGY

## 2022-07-25 PROCEDURE — 1125F AMNT PAIN NOTED PAIN PRSNT: CPT | Mod: CPTII,S$GLB,, | Performed by: UROLOGY

## 2022-07-25 PROCEDURE — 3078F DIAST BP <80 MM HG: CPT | Mod: CPTII,S$GLB,, | Performed by: UROLOGY

## 2022-07-25 PROCEDURE — 3288F PR FALLS RISK ASSESSMENT DOCUMENTED: ICD-10-PCS | Mod: CPTII,S$GLB,, | Performed by: UROLOGY

## 2022-07-25 PROCEDURE — 3008F PR BODY MASS INDEX (BMI) DOCUMENTED: ICD-10-PCS | Mod: CPTII,S$GLB,, | Performed by: UROLOGY

## 2022-07-25 PROCEDURE — 3078F PR MOST RECENT DIASTOLIC BLOOD PRESSURE < 80 MM HG: ICD-10-PCS | Mod: CPTII,S$GLB,, | Performed by: UROLOGY

## 2022-07-25 PROCEDURE — 3288F FALL RISK ASSESSMENT DOCD: CPT | Mod: CPTII,S$GLB,, | Performed by: UROLOGY

## 2022-07-25 PROCEDURE — 3066F PR DOCUMENTATION OF TREATMENT FOR NEPHROPATHY: ICD-10-PCS | Mod: CPTII,S$GLB,, | Performed by: UROLOGY

## 2022-07-25 PROCEDURE — 3075F PR MOST RECENT SYSTOLIC BLOOD PRESS GE 130-139MM HG: ICD-10-PCS | Mod: CPTII,S$GLB,, | Performed by: UROLOGY

## 2022-07-25 PROCEDURE — 3062F POS MACROALBUMINURIA REV: CPT | Mod: CPTII,S$GLB,, | Performed by: UROLOGY

## 2022-07-25 PROCEDURE — 99999 PR PBB SHADOW E&M-EST. PATIENT-LVL III: ICD-10-PCS | Mod: PBBFAC,,, | Performed by: UROLOGY

## 2022-07-25 NOTE — PROGRESS NOTES
CHIEF COMPLAINT:    Mr. Arrieta is a 66 y.o. male presenting for a follow up.  PRESENTING ILLNESS:    Jian Arrieta is a 66 y.o. male who presents for evaluation of bilateral kidney stones.    He had the following procedure on 06/30/2021:  Pre-Op Diagnosis: Right proximal ureteral stone  Post-Op Diagnosis: Right renal stone   Procedure(s) Performed:   1. Right ureteroscopy with laser lithotripsy  2. Cystoscopy  3. Right JJ ureteral stent exchange  4. Fluoro < 1 hr  Findings:  1. Large stoneencountered in midpole calyx dusted.  2. No additional stones visualized in right collecting system.  Drains:   1. Right 6 Fr x 24 cm JJ ureteral stent with strings  Stone Analysis  80% Uric acid   20% Calcium oxalate monohydrate       He had bilateral hydrocelectomy on 6/16/21.  He saw Dr. Diaz on 6/21/21 and was noted to have a post op hematoma.  He has not started urocit k due to decreased kidney function.  Today he is interested in trying ED meds.  Tried viagra in the past and it worked well for him.    PATIENT HISTORY:    Past Medical History:   Diagnosis Date    Alcohol abuse     Anasarca 1/28/2019    Anemia     Anticoagulant long-term use     Arthropathy associated with neurological disorder 9/2/2015    Atherosclerosis     Charcot foot due to diabetes mellitus     Chronic combined systolic and diastolic heart failure 01/29/2019 1-28-19 Left VentricleModerate decreased ejection fraction at 30%. Normal left ventricular cavity size. Normal wall thickness observed. Grade I (mild) left ventricular diastolic dysfunction consistent with impaired relaxation. Normal left atrial pressure. Moderate, global hypokinesis(see wall scoring diagram). Right VentricleNormal cavity size, wall thickness and ejection fraction. Wall motion n    Chronic pancreatitis 1/28/2019    CKD (chronic kidney disease) stage 3, GFR 30-59 ml/min     CKD (chronic kidney disease) stage 4, GFR 15-29 ml/min     Colon polyps     approx 5 yrs ago     Coronary artery disease due to calcified coronary lesion 05/08/2015    5 stents on ASA      Diabetic polyneuropathy associated with type 2 diabetes mellitus 9/2/2015    Diverticulosis 1/28/2019    DM type 2 with diabetic peripheral neuropathy 2/4/2019    Encounter for blood transfusion     Essential hypertension 1/28/2019    Former smoker 8/26/2015    Healed ulcer of left foot on examination 6/20/2017    Hematuria     Hydrocele     approx 1.5 yrs ago    Hyperphosphatemia     Hypoalbuminemia 2/4/2019    Hypocalcemia     Lymphedema of both lower extremities 1/29/2019    Mixed hyperlipidemia 5/8/2015    Morbid obesity with BMI of 50.0-59.9, adult 5/8/2015    Obstruction of right ureteropelvic junction (UPJ) due to stone 5/24/2021    Onychomycosis of multiple toenails with type 2 diabetes mellitus and peripheral neuropathy 6/20/2017    Perianal cyst     approx 2 yrs ago    Proteinuria     Pseudocyst of pancreas 1/28/2019 1-28-19 Liver has a cirrhotic morphology with no focal lesions.  Significant interval increase in ascites when compared to prior exam which may account for patient's abdominal distension.  Hypodense air-fluid collection along the body of the pancreas which is slightly smaller when compared to prior CT.  Findings may relate to pancreatic necrosis with pancreatic pseudocysts with infected pseudocyst    Skin cancer     skin cancer    Sleep apnea 8/26/2015    Status post bariatric surgery 1/11/2016    Type 2 diabetes mellitus, with long-term current use of insulin 5/8/2015    Urinary tract infection        Past Surgical History:   Procedure Laterality Date    ANGIOGRAPHY N/A 6/28/2019    Procedure: ANGIOGRAM-PV STENT;  Surgeon: Ewa Diagnostic Provider;  Location: [facility] OR;  Service: Radiology;  Laterality: N/A;    ANGIOPLASTY      total x5 stents    COLONOSCOPY N/A 10/6/2015    Procedure: COLONOSCOPY;  Surgeon: Shekhar Richards MD;  [facility]  Service:  Endoscopy;  Laterality: N/A;  BMI over 55/2nd floor case    5 day hold Plavix, Dr Kwadwo Arroyo    COLONOSCOPY N/A 5/13/2021    Procedure: COLONOSCOPY;  Surgeon: Huan Brumfield MD;  Location: [facility];  Service: Endoscopy;  Laterality: N/A;    CORONARY ANGIOGRAPHY Right 3/20/2019    Procedure: ANGIOGRAM, CORONARY ARTERY;  Surgeon: Bob Duque MD;  [facility];  Service: Cardiology;  Laterality: Right;    CORONARY ARTERY BYPASS GRAFT  2017    x3    CORONARY BYPASS GRAFT ANGIOGRAPHY  3/20/2019    Procedure: Bypass graft study;  Surgeon: Bob Duque MD;  [facility];  Service: Cardiology;;    CYST REMOVAL      CYSTOSCOPY Right 6/30/2021    Procedure: CYSTOSCOPY;  Surgeon: William Diaz MD;  [address];  Service: Urology;  Laterality: Right;    CYSTOSCOPY W/ URETERAL STENT PLACEMENT Right 6/16/2021    Procedure: CYSTOSCOPY, WITH URETERAL STENT INSERTION;  Surgeon: William Diaz MD;  [facility];  Service: Urology;  Laterality: Right;  FLUORO LESS THAN 1 HOUR    ENDOSCOPIC ULTRASOUND OF UPPER GASTROINTESTINAL TRACT N/A 2/26/2020    Procedure: ULTRASOUND, UPPER GI TRACT, ENDOSCOPIC;  Surgeon: Robbie Yang MD;  Location: [facility];  Service: Endoscopy;  Laterality: N/A;    ESOPHAGOGASTRODUODENOSCOPY N/A 7/8/2019    Procedure: ESOPHAGOGASTRODUODENOSCOPY (EGD);  Surgeon: Huan Brumfield MD;  Location: [facility];  Service: Endoscopy;  Laterality: N/A;    ESOPHAGOGASTRODUODENOSCOPY N/A 5/13/2021    Procedure: EGD (ESOPHAGOGASTRODUODENOSCOPY);  Surgeon: Huan Brumfield MD;  Location: [facility];  Service: Endoscopy;  Laterality: N/A;    EXCISION OF HYDROCELE Bilateral 6/16/2021    Procedure: HYDROCELE REPAIR;  Surgeon: William Diaz MD;  [facility] 2ND F[facility];  Service: Urology;  Laterality: Bilateral;  2 HOURS    FLUOROSCOPY N/A 6/16/2021    Procedure: FLUOROSCOPY;  Surgeon: William Diaz MD;  [facility];   Service: Urology;  Laterality: N/A;    GASTRECTOMY      INSERTION OF DIALYSIS CATHETER N/A 2019    Procedure: pleurx;  Surgeon: Ewa Diagnostic Provider;  Location: [facility] OR 2ND FLR;  Service: General;  Laterality: N/A;  Room 188/[facility]    KNEE ARTHROSCOPY      LAPAROSCOPIC CHOLECYSTECTOMY N/A 2020    Procedure: CHOLECYSTECTOMY, LAPAROSCOPIC;  Surgeon: SON Rowe MD;  Location: [facility] OR;  Service: General;  Laterality: N/A;    LASER LITHOTRIPSY N/A 2021    Procedure: LITHOTRIPSY, USING LASER;  Surgeon: William Diaz MD;  Location: [facility] OR 1ST FLR;  Service: Urology;  Laterality: N/A;    LIVER BIOPSY N/A 2020    Procedure: BIOPSY, LIVER, Laproscopic ;  Surgeon: OSN Rowe MD;  Location: [facility] OR;  Service: General;  Laterality: N/A;    perianal surgery      perianal cyst removed    PYELOSCOPY Right 2021    Procedure: PYELOSCOPY;  Surgeon: William Diaz MD;  Location: [facility] OR 1ST FLR;  Service: Urology;  Laterality: Right;    REPLACEMENT OF STENT Right 2021    Procedure: REPLACEMENT, STENT;  Surgeon: William Diaz MD;  Location: [facility] OR 1ST FLR;  Service: Urology;  Laterality: Right;    URETEROSCOPY Right 2021    Procedure: URETEROSCOPY FLEXIBLE URETEROSCOPE;  Surgeon: William Diaz MD;  Location: [facility] OR 1ST FLR;  Service: Urology;  Laterality: Right;       Family History   Problem Relation Age of Onset    Cancer Mother     Cancer Father     Heart disease Father     Obesity Sister     Parkinsonism Brother     No Known Problems Paternal Grandmother     Cancer Paternal Grandfather     Cancer Brother     Diabetes Maternal Grandmother     Stroke Maternal Grandfather     Cirrhosis Neg Hx        Social History     Socioeconomic History    Marital status:    Tobacco Use    Smoking status: Former Smoker     Packs/day: 2.00     Years: 30.00     Pack years: 60.00     Types: Cigarettes     Quit date: 2005     Years since quittin.4     Smokeless tobacco: Never Used   Substance and Sexual Activity    Alcohol use: No     Comment: started ~2014, reports 1 shot daily, max 3 shots daily, vague about alcohol consumption. Last drink 9/2018    Drug use: No       Allergies:  Patient has no known allergies.    Medications:    Current Outpatient Medications:     amoxicillin-clavulanate 875-125mg (AUGMENTIN) 875-125 mg per tablet, Take 1 tablet by mouth 2 (two) times daily., Disp: , Rfl:     apixaban (ELIQUIS) 5 mg Tab, Take 1 tablet (5 mg total) by mouth 2 (two) times daily., Disp: 60 tablet, Rfl: 11    insulin glargine, TOUJEO, (TOUJEO SOLOSTAR U-300 INSULIN) 300 unit/mL (1.5 mL) InPn pen, Inject 40 Units into the skin once daily. THIS IF FOR EMERGENCY BACK UP INSULIN USE ONLY - IF YOU ARE OFF OF YOUR VGO OR INSULIN PUMP, GIVE THIS ONCE PER DAY., Disp: 5 pen, Rfl: 1    insulin lispro-aabc (LYUMJEV U-100 INSULIN) 100 unit/mL, Inject 80 Units into the skin continuous. USES IN OMNIPOD INSULIN PUMP. USE AS DIRECTED. DO NOT DIRECTLY INJECT., Disp: 40 mL, Rfl: 3    insulin pump cart,cont inf,BT (OMNIPOD DASH PODS, GEN 4,) Crtg, Inject 1 each into the skin every other day. Change pod every 2 days., Disp: 15 each, Rfl: 11    rosuvastatin (CRESTOR) 5 MG tablet, TAKE 1 TABLET BY MOUTH EVERY DAY, Disp: 90 tablet, Rfl: 3    tamsulosin (FLOMAX) 0.4 mg Cap, Take 1 capsule (0.4 mg total) by mouth once daily., Disp: 30 capsule, Rfl: 1    aspirin (ECOTRIN) 81 MG EC tablet, Take 1 tablet (81 mg total) by mouth once daily., Disp: 9 tablet, Rfl: 0    blood-glucose sensor (DEXCOM G6 SENSOR) Sandi, 9 each by Misc.(Non-Drug; Combo Route) route continuous., Disp: 3 each, Rfl: PRN    blood-glucose transmitter (DEXCOM G6 TRANSMITTER) Sandi, 3 each by Misc.(Non-Drug; Combo Route) route continuous., Disp: 3 each, Rfl: PRN    bumetanide (BUMEX) 1 MG tablet, Take 1 tablet (1 mg total) by mouth before breakfast AND 0.5 tablets (0.5 mg total) every evening. (Patient not taking:  Reported on 6/29/2022), Disp: 45 tablet, Rfl: 11    glucagon, human recombinant, (GLUCAGON EMERGENCY KIT, HUMAN,) 1 mg SolR, Inject 1 mg into the muscle as needed (For severely low sugar). (Patient not taking: Reported on 6/29/2022), Disp: 1 each, Rfl: 2    PHYSICAL EXAMINATION:    Constitutional: He appears well-developed and well-nourished.  He is in no apparent distress.    Eyes: No scleral icterus noted bilaterally. No discharge bilaterally.    Nose: No rhinorrhea    Cardiovascular: Normal rate.      Pulmonary/Chest: Effort normal. No respiratory distress.     Abdominal:  He exhibits no distension.      Neurological: He is alert and oriented to person, place, and time.     Psych: Cooperative with normal affect.      Physical Exam     CT RSS 6/27/22  Nonobstructing 10 mm left renal stone.  No obstructive uropathy.   Fecalization of distal small bowel loops, suggesting slow transit.  Enlarged prostate.   Mildly nodular hepatic contour.  Correlate for chronic liver disease.    KUB 6/27/22  Suboptimal evaluation of portions of both renal soft tissue contours due to superimposition of fecal matter and bowel gas as well as body habitus..  Though the earlier CT exam had documented bilateral nephrolithiasis foci to include nonobstructing stone and the right renal pelvis, based on these 2 images, no definite opaque urinary tract calculi or obviously demonstrated given above mentioned limitations.    Urine pH 5, 500 protein    IMPRESSION:  Type 2 diabetes mellitus with stage 3 chronic kidney disease, with long-term current use of insulin, unspecified whether stage 3a or 3b CKD    Bilateral kidney stones  -     CT Renal Stone Study ABD Pelvis WO; Future; Expected date: 07/25/2022  -     X-Ray Abdomen AP 1 View; Future; Expected date: 07/25/2022  -     Basic Metabolic Panel; Future; Expected date: 07/25/2022  -     Uric Acid; Future; Expected date: 07/25/2022    Calculus of urinary tract in diseases classified elsewhere  -      CT Renal Stone Study ABD Pelvis WO; Future; Expected date: 07/25/2022        PLAN:  I reviewed his CT, KUB and CMP in detail.  For his kidney stone disease, his stone burden is not too bad.  No right kidney stone or tiny stone in the right upper pole noted.  The left side stone burden is decreased compared to CT from 2021.  Will continue to follow up with BMP, KUB, PSA, and CT RSS in 1 year.  Pt was encouraged to drink plenty of water.  Unable to do urocit K due to his kidney function.  Need to get a better control of his diabetes in order to preserve his renal function.  I spent 25 minutes with the patient of which more than half was spent in direct consultation with the patient in regards to our treatment and plan.      Follow up in about 1 year (around 7/25/2023) for KUB, CT RSS, uric acid, BMP.

## 2022-07-29 ENCOUNTER — HOSPITAL ENCOUNTER (OUTPATIENT)
Dept: WOUND CARE | Facility: HOSPITAL | Age: 67
Discharge: HOME | End: 2022-07-29
Attending: STUDENT IN AN ORGANIZED HEALTH CARE EDUCATION/TRAINING PROGRAM
Payer: MEDICARE

## 2022-07-29 DIAGNOSIS — L97.909 VENOUS ULCER: Primary | ICD-10-CM

## 2022-07-29 DIAGNOSIS — I87.2 VENOUS INSUFFICIENCY OF BOTH LOWER EXTREMITIES: ICD-10-CM

## 2022-07-29 DIAGNOSIS — I89.0 LYMPHEDEMA OF BOTH LOWER EXTREMITIES: ICD-10-CM

## 2022-07-29 DIAGNOSIS — I83.009 VENOUS ULCER: Primary | ICD-10-CM

## 2022-07-29 DIAGNOSIS — L03.119 CELLULITIS OF LOWER EXTREMITY, UNSPECIFIED LATERALITY: ICD-10-CM

## 2022-07-29 PROCEDURE — 87186 SC STD MICRODIL/AGAR DIL: CPT | Performed by: STUDENT IN AN ORGANIZED HEALTH CARE EDUCATION/TRAINING PROGRAM

## 2022-07-29 PROCEDURE — 87077 CULTURE AEROBIC IDENTIFY: CPT | Mod: 59 | Performed by: STUDENT IN AN ORGANIZED HEALTH CARE EDUCATION/TRAINING PROGRAM

## 2022-07-29 PROCEDURE — 99214 OFFICE O/P EST MOD 30 MIN: CPT | Mod: 25,,, | Performed by: STUDENT IN AN ORGANIZED HEALTH CARE EDUCATION/TRAINING PROGRAM

## 2022-07-29 PROCEDURE — 29581 APPL MULTLAYER CMPRN SYS LEG: CPT

## 2022-07-29 PROCEDURE — 11042 DBRDMT SUBQ TIS 1ST 20SQCM/<: CPT

## 2022-07-29 PROCEDURE — 11042 DBRDMT SUBQ TIS 1ST 20SQCM/<: CPT | Mod: ,,, | Performed by: STUDENT IN AN ORGANIZED HEALTH CARE EDUCATION/TRAINING PROGRAM

## 2022-07-29 PROCEDURE — 11042 DEBRIDEMENT: ICD-10-PCS | Mod: ,,, | Performed by: STUDENT IN AN ORGANIZED HEALTH CARE EDUCATION/TRAINING PROGRAM

## 2022-07-29 PROCEDURE — 87075 CULTR BACTERIA EXCEPT BLOOD: CPT | Performed by: STUDENT IN AN ORGANIZED HEALTH CARE EDUCATION/TRAINING PROGRAM

## 2022-07-29 PROCEDURE — 99214 PR OFFICE/OUTPT VISIT, EST, LEVL IV, 30-39 MIN: ICD-10-PCS | Mod: 25,,, | Performed by: STUDENT IN AN ORGANIZED HEALTH CARE EDUCATION/TRAINING PROGRAM

## 2022-07-29 PROCEDURE — 87070 CULTURE OTHR SPECIMN AEROBIC: CPT | Performed by: STUDENT IN AN ORGANIZED HEALTH CARE EDUCATION/TRAINING PROGRAM

## 2022-07-29 PROCEDURE — 87076 CULTURE ANAEROBE IDENT EACH: CPT | Performed by: STUDENT IN AN ORGANIZED HEALTH CARE EDUCATION/TRAINING PROGRAM

## 2022-07-29 RX ORDER — DOXYCYCLINE HYCLATE 100 MG
100 TABLET ORAL 2 TIMES DAILY
Qty: 20 TABLET | Refills: 0 | Status: SHIPPED | OUTPATIENT
Start: 2022-07-29 | End: 2023-06-07 | Stop reason: SDUPTHER

## 2022-07-29 NOTE — PROGRESS NOTES
[facility]    Subjective:       Patient ID: Jian Arrieta is a 66 y.o. male.    Chief Complaint: Non-healing Wound Follow Up    7/29/22: Follow up for lle skin tear. Wound remains unchanged, no complaints per patient. 3+pitting edema present to BLE. Pt has not been taking prescribed Bumex as ordered due to him confusing it with Flomax. Wound debrided culture taken and Rx sent to pharmacy for Doxycyline po.  Educated patient to take medications as prescribed and to decrease sodium in diet and leg elevation. Patient verbalized understanding. RTC 1 week.     Review of Systems   Constitutional: Negative for activity change, chills, diaphoresis and fever.   HENT: Negative for congestion and hearing loss.    Respiratory: Negative for cough and shortness of breath.    Cardiovascular: Positive for leg swelling.   Gastrointestinal: Negative for nausea and vomiting.   Skin: Positive for wound. Negative for color change, pallor and rash.   Neurological: Positive for numbness. Negative for tremors, speech difficulty and weakness.   Psychiatric/Behavioral: Negative for agitation and confusion. The patient is not nervous/anxious.          Objective:        Physical Exam  Constitutional:       General: He is not in acute distress.     Appearance: Normal appearance. He is well-developed. He is not diaphoretic.   Cardiovascular:      Comments: Dorsalis pedis and posterior tibial pulses are mildly diminished. Skin temperature is within normal limits. Toes are cool to touch and feet are warm proximally. Hair growth is diminished. Skin is atrophic and with mild hyperpigmentation. Pitting edema noted, bilaterally.   Musculoskeletal:         General: No tenderness.      Comments: Adequate joint range of motion without pain, limitation, nor crepitation to foot and ankle joints. Muscle strength is 5/5 in all groups bilaterally.       Feet:      Right foot:      Skin integrity: Dry skin  present. No ulcer, blister, erythema or warmth.      Left foot:      Skin integrity: Dry skin present. No ulcer, blister, erythema or warmth.   Skin:     General: Skin is warm and dry.      Capillary Refill: Capillary refill takes less than 2 seconds.      Comments: Skin is warm and dry, no acute signs of infection noted bilaterally      See wound description below    Toenails are thickened by 2-4 mm's, dystrophic, and are darkened in coloration with subungual fungal debris.     Otherwise, no open wounds, macerations or hyperkeratotic lesions, bilaterally            Neurological:      Mental Status: He is alert and oriented to person, place, and time.      Sensory: Sensory deficit present.      Motor: No abnormal muscle tone.      Comments: Light touch within normal limits.    Psychiatric:         Mood and Affect: Mood normal.         Behavior: Behavior normal.         Thought Content: Thought content normal.            Wound 07/15/22 0900 Skin Tear Left lower Leg (Active)   07/15/22 0900    Pre-existing: Yes   Primary Wound Type: Skin tear   Side: Left   Orientation: lower   Location: Leg   Wound Number:    Ankle-Brachial Index:    Pulses:    Removal Indication and Assessment:    Wound Outcome:    (Retired) Wound Type:    (Retired) Wound Length (cm):    (Retired) Wound Width (cm):    (Retired) Depth (cm):    Wound Description (Comments):    Removal Indications:    Wound Image   07/29/22 0900   Dressing Appearance Intact;Moist drainage 07/29/22 0900   Drainage Amount Moderate 07/29/22 0900   Drainage Characteristics/Odor Serosanguineous 07/29/22 0900   Appearance Red;Yellow;Moist 07/29/22 0900   Tissue loss description Full thickness 07/29/22 0900   Red (%), Wound Tissue Color 95 % 07/29/22 0900   Yellow (%), Wound Tissue Color 5 % 07/29/22 0900   Periwound Area Intact;Dry;Edematous 07/29/22 0900   Wound Edges Irregular;Open 07/29/22 0900   Wound Length (cm) 4 cm 07/29/22 0900   Wound Width (cm) 3 cm 07/29/22 0900    Wound Depth (cm) 0.3 cm 07/29/22 0900   Wound Volume (cm^3) 3.6 cm^3 07/29/22 0900   Wound Surface Area (cm^2) 12 cm^2 07/29/22 0900   Care Cleansed with:;Sterile normal saline;Debrided 07/29/22 0900   Dressing Applied;Hydrofiber;Absorptive Pad;Compression wrap;Tubular bandage;Methylene blue/gentian violet 07/29/22 0900   Periwound Care Absorptive dressing applied;Skin barrier film applied 07/29/22 0900   Compression Four layer compression;Tubular elasticized bandage 07/29/22 0900   Dressing Change Due 08/05/22 07/29/22 0900            Wound 07/29/22 0919  Right anterior;lower Leg (Active)   07/29/22 0919    Pre-existing: Yes   Primary Wound Type:    Side: Right   Orientation: anterior;lower   Location: Leg   Wound Number:    Ankle-Brachial Index:    Pulses:    Removal Indication and Assessment:    Wound Outcome:    (Retired) Wound Type:    (Retired) Wound Length (cm):    (Retired) Wound Width (cm):    (Retired) Depth (cm):    Wound Description (Comments):    Removal Indications:    Dressing Appearance Intact 07/29/22 0900   Drainage Amount None 07/29/22 0900   Periwound Area Edematous 07/29/22 0900   Care Cleansed with:;Soap and water 07/29/22 0900   Dressing Applied;Compression wrap;Tubular bandage 07/29/22 0900   Compression Four layer compression;Tubular elasticized bandage 07/29/22 0900   Dressing Change Due 08/05/22 07/29/22 0900         Assessment:         ICD-10-CM ICD-9-CM   1. Lymphedema of both lower extremities  I89.0 457.1   2. Skin tear of left lower leg without complication, initial encounter  S81.812A 891.0   3. Venous insufficiency of both lower extremities  I87.2 459.81   4. Diabetes mellitus due to underlying condition, uncontrolled, with renal complication  E08.29 249.41    E08.65          Plan:   Tissue pathology and/or culture taken:  [] Yes [x] No   Sharp debridement performed:   [x] Yes [] No   Labs ordered this visit:   [] Yes [x] No   Imaging ordered this visit:   [] Yes [x] No            Orders Placed This Encounter   Procedures    Culture, Anaerobe     Standing Status:   Standing     Number of Occurrences:   1    Aerobic culture     Standing Status:   Standing     Number of Occurrences:   1    Change dressing     Left lower leg skin tear:   Cleanse with: Normal saline   Lidocaine: prn   Silver nitrate: prn   Periwound care: Gentian Violet  Primary dressing: Hydrofera Ready  small ABD   Secondary dressing: Profore BLE toes to knee   Edema control: Profore BLE, Tubigrip F BLE   Frequency: weekly   Follow-up: Dr. Felton 8/5/22    Culture taken and Rx sent to pharmacy for Doxycycline PO 7/29/22.        -Leg wound with worsening appearance, cultures taken. Antibiotics sent to pharmacy.   -Wound care per above  -Rest, elevate, avoid salt intake. Continue follow up with PCP and Cardiology for leg swelling  -Follow up with Cardiology for venous stasis  -Shoe inspection. Diabetic Foot Education. Patient reminded of the importance of good nutrition and blood sugar control to help prevent podiatric complications of diabetes. Inspect feet daily for skin breaks, blisters, swelling, or redness. Wear cotton socks (preferably white)  Change socks every day. Do NOT walk barefoot. Do NOT use heating pads or warm/hot water soaks. I discussed with the  patient  signs and symptoms of infection including redness, drainage, purulence, odor, pain, elevated BS, streaking, fever, chills, etc . Patient is to seek medical attention (ER or urgent care) if these symptoms occur                    Follow up in about 1 week (around 8/5/2022) for .

## 2022-08-01 LAB
BACTERIA SPEC AEROBE CULT: ABNORMAL
BACTERIA SPEC AEROBE CULT: ABNORMAL

## 2022-08-02 DIAGNOSIS — L03.119 CELLULITIS OF LOWER EXTREMITY, UNSPECIFIED LATERALITY: Primary | ICD-10-CM

## 2022-08-02 RX ORDER — AMOXICILLIN AND CLAVULANATE POTASSIUM 875; 125 MG/1; MG/1
1 TABLET, FILM COATED ORAL EVERY 12 HOURS
Qty: 20 TABLET | Refills: 0 | Status: SHIPPED | OUTPATIENT
Start: 2022-08-02 | End: 2022-08-12

## 2022-08-03 NOTE — PROCEDURES
Debridement    Date/Time: 7/29/2022 8:48 AM  Performed by: Savanah Felton DPM  Authorized by: Savanah Felton DPM     Consent Done?:  Yes (Verbal)  Local anesthesia used?: No      Wound Details:    Location:  Left leg    Type of Debridement:  Excisional       Length (cm):  4       Area (sq cm):  12       Width (cm):  3       Percent Debrided (%):  100       Depth (cm):  0.3       Total Area Debrided (sq cm):  12    Depth of debridement:  Subcutaneous tissue    Tissue debrided:  Subcutaneous and Other    Devitalized tissue debrided:  Biofilm, Callus and Fibrin    Instruments:  Blade and Curette    Bleeding:  Minimal  Patient tolerance:  Patient tolerated the procedure well with no immediate complications     cultures were taken during this visit

## 2022-08-04 ENCOUNTER — PATIENT OUTREACH (OUTPATIENT)
Dept: ADMINISTRATIVE | Facility: HOSPITAL | Age: 67
End: 2022-08-04
Payer: MEDICARE

## 2022-08-04 NOTE — PROGRESS NOTES
Chart reviewed for PHN Report. Patient is due for an eye exam but no orders were placed in the system.

## 2022-08-05 ENCOUNTER — HOSPITAL ENCOUNTER (OUTPATIENT)
Dept: WOUND CARE | Facility: HOSPITAL | Age: 67
Discharge: HOME | End: 2022-08-05
Attending: STUDENT IN AN ORGANIZED HEALTH CARE EDUCATION/TRAINING PROGRAM
Payer: MEDICARE

## 2022-08-05 VITALS
SYSTOLIC BLOOD PRESSURE: 132 MMHG | WEIGHT: 250 LBS | TEMPERATURE: 98 F | BODY MASS INDEX: 39.24 KG/M2 | HEART RATE: 70 BPM | HEIGHT: 67 IN | DIASTOLIC BLOOD PRESSURE: 66 MMHG

## 2022-08-05 DIAGNOSIS — Z79.4 TYPE 2 DIABETES MELLITUS WITH DIABETIC NEUROPATHY, WITH LONG-TERM CURRENT USE OF INSULIN: Chronic | ICD-10-CM

## 2022-08-05 DIAGNOSIS — E11.40 TYPE 2 DIABETES MELLITUS WITH DIABETIC NEUROPATHY, WITH LONG-TERM CURRENT USE OF INSULIN: Chronic | ICD-10-CM

## 2022-08-05 DIAGNOSIS — I89.0 LYMPHEDEMA OF BOTH LOWER EXTREMITIES: Primary | Chronic | ICD-10-CM

## 2022-08-05 DIAGNOSIS — I87.2 VENOUS INSUFFICIENCY OF BOTH LOWER EXTREMITIES: ICD-10-CM

## 2022-08-05 DIAGNOSIS — I83.009 VENOUS ULCER: ICD-10-CM

## 2022-08-05 DIAGNOSIS — L97.909 VENOUS ULCER: ICD-10-CM

## 2022-08-05 LAB — BACTERIA SPEC ANAEROBE CULT: ABNORMAL

## 2022-08-05 PROCEDURE — 29581 APPL MULTLAYER CMPRN SYS LEG: CPT

## 2022-08-05 PROCEDURE — 11042 DBRDMT SUBQ TIS 1ST 20SQCM/<: CPT | Mod: ,,, | Performed by: STUDENT IN AN ORGANIZED HEALTH CARE EDUCATION/TRAINING PROGRAM

## 2022-08-05 PROCEDURE — 99214 OFFICE O/P EST MOD 30 MIN: CPT | Mod: 25,,, | Performed by: STUDENT IN AN ORGANIZED HEALTH CARE EDUCATION/TRAINING PROGRAM

## 2022-08-05 PROCEDURE — 11042 DBRDMT SUBQ TIS 1ST 20SQCM/<: CPT

## 2022-08-05 PROCEDURE — 99214 PR OFFICE/OUTPT VISIT, EST, LEVL IV, 30-39 MIN: ICD-10-PCS | Mod: 25,,, | Performed by: STUDENT IN AN ORGANIZED HEALTH CARE EDUCATION/TRAINING PROGRAM

## 2022-08-05 PROCEDURE — 11042 DEBRIDEMENT: ICD-10-PCS | Mod: ,,, | Performed by: STUDENT IN AN ORGANIZED HEALTH CARE EDUCATION/TRAINING PROGRAM

## 2022-08-05 NOTE — PROGRESS NOTES
[facility]    Subjective:       Patient ID: Jian Arrieta is a 66 y.o. male.    Chief Complaint: Non-healing Wound Follow Up    8/5/22: Follow up for LLE wound and ble. Wound slowly improving. Sharps debridement tolerated well.  Wound culture reviewed with patient. Nurse visit for ID for antibiotic recommendations. Continued with plan of care.    Review of Systems   Constitutional: Negative for activity change, chills, diaphoresis and fever.   HENT: Negative for congestion and hearing loss.    Respiratory: Negative for cough and shortness of breath.    Cardiovascular: Positive for leg swelling.   Gastrointestinal: Negative for nausea and vomiting.   Skin: Positive for wound. Negative for color change, pallor and rash.   Neurological: Positive for numbness. Negative for tremors, speech difficulty and weakness.   Psychiatric/Behavioral: Negative for agitation and confusion. The patient is not nervous/anxious.          Objective:        Physical Exam  Constitutional:       General: He is not in acute distress.     Appearance: Normal appearance. He is well-developed. He is not diaphoretic.   Cardiovascular:      Comments: Dorsalis pedis and posterior tibial pulses are mildly diminished. Skin temperature is within normal limits. Toes are cool to touch and feet are warm proximally. Hair growth is diminished. Skin is atrophic and with mild hyperpigmentation. Pitting edema noted, bilaterally.   Musculoskeletal:         General: No tenderness.      Comments: Adequate joint range of motion without pain, limitation, nor crepitation to foot and ankle joints. Muscle strength is 5/5 in all groups bilaterally.       Feet:      Right foot:      Skin integrity: Dry skin present. No ulcer, blister, erythema or warmth.      Left foot:      Skin integrity: Dry skin present. No ulcer, blister, erythema or warmth.   Skin:     General: Skin is warm and dry.      Capillary Refill:  Capillary refill takes less than 2 seconds.      Comments: Skin is warm and dry, no acute signs of infection noted bilaterally      See wound description below    Toenails are thickened by 2-4 mm's, dystrophic, and are darkened in coloration with subungual fungal debris.     Otherwise, no open wounds, macerations or hyperkeratotic lesions, bilaterally            Neurological:      Mental Status: He is alert and oriented to person, place, and time.      Sensory: Sensory deficit present.      Motor: No abnormal muscle tone.      Comments: Light touch within normal limits.    Psychiatric:         Mood and Affect: Mood normal.         Behavior: Behavior normal.         Thought Content: Thought content normal.            Wound 07/15/22 0900 Skin Tear Left lower Leg (Active)   07/15/22 0900    Pre-existing: Yes   Primary Wound Type: Skin tear   Side: Left   Orientation: lower   Location: Leg   Wound Number:    Ankle-Brachial Index:    Pulses:    Removal Indication and Assessment:    Wound Outcome:    (Retired) Wound Type:    (Retired) Wound Length (cm):    (Retired) Wound Width (cm):    (Retired) Depth (cm):    Wound Description (Comments):    Removal Indications:    Wound Image   08/05/22 0800   Dressing Appearance Intact;Moist drainage 08/05/22 0800   Drainage Amount Small 08/05/22 0800   Drainage Characteristics/Odor Serosanguineous 08/05/22 0800   Appearance Red;Yellow;Moist;Epithelialization 08/05/22 0800   Tissue loss description Full thickness 08/05/22 0800   Red (%), Wound Tissue Color 90 % 08/05/22 0800   Yellow (%), Wound Tissue Color 10 % 08/05/22 0800   Periwound Area Intact;Dry;Edematous 08/05/22 0800   Wound Edges Irregular 08/05/22 0800   Wound Length (cm) 3.5 cm 08/05/22 0800   Wound Width (cm) 2.5 cm 08/05/22 0800   Wound Depth (cm) 0.2 cm 08/05/22 0800   Wound Volume (cm^3) 1.75 cm^3 08/05/22 0800   Wound Surface Area (cm^2) 8.75 cm^2 08/05/22 0800   Care Cleansed with:;Sterile normal saline;Debrided  08/05/22 0800   Dressing Applied;Hydrofiber;Absorptive Pad;Compression wrap;Tubular bandage;Methylene blue/gentian violet 08/05/22 0800   Periwound Care Absorptive dressing applied 08/05/22 0800   Compression Four layer compression;Tubular elasticized bandage 08/05/22 0800   Dressing Change Due 08/12/22 08/05/22 0800            Wound 07/29/22 0919  Right anterior;lower Leg (Active)   07/29/22 0919    Pre-existing: Yes   Primary Wound Type:    Side: Right   Orientation: anterior;lower   Location: Leg   Wound Number:    Ankle-Brachial Index:    Pulses:    Removal Indication and Assessment:    Wound Outcome:    (Retired) Wound Type:    (Retired) Wound Length (cm):    (Retired) Wound Width (cm):    (Retired) Depth (cm):    Wound Description (Comments):    Removal Indications:    Wound Image   08/05/22 0800   Dressing Appearance Intact 08/05/22 0800   Periwound Area Dry;Edematous 08/05/22 0800   Wound Length (cm) 0 cm 08/05/22 0800   Wound Width (cm) 0 cm 08/05/22 0800   Wound Depth (cm) 0 cm 08/05/22 0800   Wound Volume (cm^3) 0 cm^3 08/05/22 0800   Wound Surface Area (cm^2) 0 cm^2 08/05/22 0800   Dressing Applied;Compression wrap;Tubular bandage 08/05/22 0800   Compression Tubular elasticized bandage;Four layer compression 08/05/22 0800   Dressing Change Due 08/12/22 08/05/22 0800         Assessment:         ICD-10-CM ICD-9-CM   1. Lymphedema of both lower extremities  I89.0 457.1   2. Venous insufficiency of both lower extremities  I87.2 459.81   3. Type 2 diabetes mellitus with diabetic neuropathy, with long-term current use of insulin  E11.40 250.60    Z79.4 357.2     V58.67         Plan:   Tissue pathology and/or culture taken:  [] Yes [x] No   Sharp debridement performed:   [x] Yes [] No   Labs ordered this visit:   [] Yes [x] No   Imaging ordered this visit:   [] Yes [x] No           Orders Placed This Encounter   Procedures    Change dressing     Left lower leg skin tear:   Cleanse with: Normal saline    Lidocaine: prn   Silver nitrate: prn   Periwound care: Gentian Violet   Primary dressing: Hydrofera Classic,  small ABD   Secondary dressing: Profore BLE toes to knee   Edema control: Profore BLE, Tubigrip F BLE   Frequency: weekly   Follow-up: Dr. Felton 8/12/22     Continue Augmentin Po   Nurse visit with ID for abx recommendation        -Continue antibiotics  -Wound care per above  -Rest, elevate, avoid salt intake. Continue follow up with PCP and Cardiology for leg swelling  -Follow up with Cardiology for venous stasis  -Shoe inspection. Diabetic Foot Education. Patient reminded of the importance of good nutrition and blood sugar control to help prevent podiatric complications of diabetes. Inspect feet daily for skin breaks, blisters, swelling, or redness. Wear cotton socks (preferably white)  Change socks every day. Do NOT walk barefoot. Do NOT use heating pads or warm/hot water soaks. I discussed with the  patient  signs and symptoms of infection including redness, drainage, purulence, odor, pain, elevated BS, streaking, fever, chills, etc . Patient is to seek medical attention (ER or urgent care) if these symptoms occur      Follow up in about 1 week (around 8/12/2022) for .

## 2022-08-10 ENCOUNTER — OFFICE VISIT (OUTPATIENT)
Dept: INTERNAL MEDICINE | Facility: CLINIC | Age: 67
End: 2022-08-10
Payer: MEDICARE

## 2022-08-10 VITALS
SYSTOLIC BLOOD PRESSURE: 140 MMHG | BODY MASS INDEX: 40.42 KG/M2 | WEIGHT: 257.5 LBS | DIASTOLIC BLOOD PRESSURE: 78 MMHG | RESPIRATION RATE: 14 BRPM | HEIGHT: 67 IN | HEART RATE: 81 BPM | OXYGEN SATURATION: 98 % | TEMPERATURE: 97 F

## 2022-08-10 DIAGNOSIS — E11.40 TYPE 2 DIABETES MELLITUS WITH DIABETIC NEUROPATHY, WITH LONG-TERM CURRENT USE OF INSULIN: Primary | Chronic | ICD-10-CM

## 2022-08-10 DIAGNOSIS — K74.69 OTHER CIRRHOSIS OF LIVER: ICD-10-CM

## 2022-08-10 DIAGNOSIS — E11.69 HYPERLIPIDEMIA ASSOCIATED WITH TYPE 2 DIABETES MELLITUS: Chronic | ICD-10-CM

## 2022-08-10 DIAGNOSIS — Z79.4 TYPE 2 DIABETES MELLITUS WITH DIABETIC NEUROPATHY, WITH LONG-TERM CURRENT USE OF INSULIN: Primary | Chronic | ICD-10-CM

## 2022-08-10 DIAGNOSIS — Z98.84 STATUS POST BARIATRIC SURGERY: ICD-10-CM

## 2022-08-10 DIAGNOSIS — I48.0 PAROXYSMAL ATRIAL FIBRILLATION: ICD-10-CM

## 2022-08-10 DIAGNOSIS — I15.2 HYPERTENSION ASSOCIATED WITH DIABETES: Chronic | ICD-10-CM

## 2022-08-10 DIAGNOSIS — E11.42 DIABETIC POLYNEUROPATHY ASSOCIATED WITH TYPE 2 DIABETES MELLITUS: ICD-10-CM

## 2022-08-10 DIAGNOSIS — E11.59 HYPERTENSION ASSOCIATED WITH DIABETES: Chronic | ICD-10-CM

## 2022-08-10 DIAGNOSIS — E13.9 LADA (LATENT AUTOIMMUNE DIABETES IN ADULTS), MANAGED AS TYPE 1: ICD-10-CM

## 2022-08-10 DIAGNOSIS — K86.1 OTHER CHRONIC PANCREATITIS: ICD-10-CM

## 2022-08-10 DIAGNOSIS — I50.42 CHRONIC COMBINED SYSTOLIC AND DIASTOLIC HEART FAILURE: ICD-10-CM

## 2022-08-10 DIAGNOSIS — I89.0 LYMPHEDEMA OF BOTH LOWER EXTREMITIES: Chronic | ICD-10-CM

## 2022-08-10 DIAGNOSIS — E78.5 HYPERLIPIDEMIA ASSOCIATED WITH TYPE 2 DIABETES MELLITUS: Chronic | ICD-10-CM

## 2022-08-10 PROCEDURE — 3288F PR FALLS RISK ASSESSMENT DOCUMENTED: ICD-10-PCS | Mod: CPTII,S$GLB,, | Performed by: NURSE PRACTITIONER

## 2022-08-10 PROCEDURE — 95251 PR GLUCOSE MONITOR, 72 HOUR, PHYS INTERP: ICD-10-PCS | Mod: S$GLB,,, | Performed by: NURSE PRACTITIONER

## 2022-08-10 PROCEDURE — 95251 CONT GLUC MNTR ANALYSIS I&R: CPT | Mod: S$GLB,,, | Performed by: NURSE PRACTITIONER

## 2022-08-10 PROCEDURE — 99215 OFFICE O/P EST HI 40 MIN: CPT | Mod: S$GLB,,, | Performed by: NURSE PRACTITIONER

## 2022-08-10 PROCEDURE — 1160F RVW MEDS BY RX/DR IN RCRD: CPT | Mod: CPTII,S$GLB,, | Performed by: NURSE PRACTITIONER

## 2022-08-10 PROCEDURE — 1101F PT FALLS ASSESS-DOCD LE1/YR: CPT | Mod: CPTII,S$GLB,, | Performed by: NURSE PRACTITIONER

## 2022-08-10 PROCEDURE — 1126F PR PAIN SEVERITY QUANTIFIED, NO PAIN PRESENT: ICD-10-PCS | Mod: CPTII,S$GLB,, | Performed by: NURSE PRACTITIONER

## 2022-08-10 PROCEDURE — 3077F SYST BP >= 140 MM HG: CPT | Mod: CPTII,S$GLB,, | Performed by: NURSE PRACTITIONER

## 2022-08-10 PROCEDURE — 1126F AMNT PAIN NOTED NONE PRSNT: CPT | Mod: CPTII,S$GLB,, | Performed by: NURSE PRACTITIONER

## 2022-08-10 PROCEDURE — 3288F FALL RISK ASSESSMENT DOCD: CPT | Mod: CPTII,S$GLB,, | Performed by: NURSE PRACTITIONER

## 2022-08-10 PROCEDURE — 1159F MED LIST DOCD IN RCRD: CPT | Mod: CPTII,S$GLB,, | Performed by: NURSE PRACTITIONER

## 2022-08-10 PROCEDURE — 3066F PR DOCUMENTATION OF TREATMENT FOR NEPHROPATHY: ICD-10-PCS | Mod: CPTII,S$GLB,, | Performed by: NURSE PRACTITIONER

## 2022-08-10 PROCEDURE — 3046F PR MOST RECENT HEMOGLOBIN A1C LEVEL > 9.0%: ICD-10-PCS | Mod: CPTII,S$GLB,, | Performed by: NURSE PRACTITIONER

## 2022-08-10 PROCEDURE — 3066F NEPHROPATHY DOC TX: CPT | Mod: CPTII,S$GLB,, | Performed by: NURSE PRACTITIONER

## 2022-08-10 PROCEDURE — 3062F POS MACROALBUMINURIA REV: CPT | Mod: CPTII,S$GLB,, | Performed by: NURSE PRACTITIONER

## 2022-08-10 PROCEDURE — 1159F PR MEDICATION LIST DOCUMENTED IN MEDICAL RECORD: ICD-10-PCS | Mod: CPTII,S$GLB,, | Performed by: NURSE PRACTITIONER

## 2022-08-10 PROCEDURE — 3078F DIAST BP <80 MM HG: CPT | Mod: CPTII,S$GLB,, | Performed by: NURSE PRACTITIONER

## 2022-08-10 PROCEDURE — 1160F PR REVIEW ALL MEDS BY PRESCRIBER/CLIN PHARMACIST DOCUMENTED: ICD-10-PCS | Mod: CPTII,S$GLB,, | Performed by: NURSE PRACTITIONER

## 2022-08-10 PROCEDURE — 3008F BODY MASS INDEX DOCD: CPT | Mod: CPTII,S$GLB,, | Performed by: NURSE PRACTITIONER

## 2022-08-10 PROCEDURE — 1101F PR PT FALLS ASSESS DOC 0-1 FALLS W/OUT INJ PAST YR: ICD-10-PCS | Mod: CPTII,S$GLB,, | Performed by: NURSE PRACTITIONER

## 2022-08-10 PROCEDURE — 3008F PR BODY MASS INDEX (BMI) DOCUMENTED: ICD-10-PCS | Mod: CPTII,S$GLB,, | Performed by: NURSE PRACTITIONER

## 2022-08-10 PROCEDURE — 99215 PR OFFICE/OUTPT VISIT, EST, LEVL V, 40-54 MIN: ICD-10-PCS | Mod: S$GLB,,, | Performed by: NURSE PRACTITIONER

## 2022-08-10 PROCEDURE — 3078F PR MOST RECENT DIASTOLIC BLOOD PRESSURE < 80 MM HG: ICD-10-PCS | Mod: CPTII,S$GLB,, | Performed by: NURSE PRACTITIONER

## 2022-08-10 PROCEDURE — 99999 PR PBB SHADOW E&M-EST. PATIENT-LVL V: CPT | Mod: PBBFAC,,, | Performed by: NURSE PRACTITIONER

## 2022-08-10 PROCEDURE — 3046F HEMOGLOBIN A1C LEVEL >9.0%: CPT | Mod: CPTII,S$GLB,, | Performed by: NURSE PRACTITIONER

## 2022-08-10 PROCEDURE — 3077F PR MOST RECENT SYSTOLIC BLOOD PRESSURE >= 140 MM HG: ICD-10-PCS | Mod: CPTII,S$GLB,, | Performed by: NURSE PRACTITIONER

## 2022-08-10 PROCEDURE — 3062F PR POS MACROALBUMINURIA RESULT DOCUMENTED/REVIEW: ICD-10-PCS | Mod: CPTII,S$GLB,, | Performed by: NURSE PRACTITIONER

## 2022-08-10 PROCEDURE — 99999 PR PBB SHADOW E&M-EST. PATIENT-LVL V: ICD-10-PCS | Mod: PBBFAC,,, | Performed by: NURSE PRACTITIONER

## 2022-08-10 RX ORDER — DAPAGLIFLOZIN 5 MG/1
5 TABLET, FILM COATED ORAL DAILY
Qty: 30 TABLET | Refills: 3 | Status: SHIPPED | OUTPATIENT
Start: 2022-08-10 | End: 2023-03-16

## 2022-08-10 NOTE — PATIENT INSTRUCTIONS
Please meet with the diabetes educator to train/start on your omnipod Dash - Insulin pump -   This should give you better glucose control.     Switch from vgo 30 to Omnipod dash -     For low blood sugar - remember to keep glucose tablets on hand. You can purchase these at the pharmacy check out desk/over the counter.   If blood sugar is less than 70, take/eat 2 - 3 tablets quickly to bring your sugar up.   Always keep 15 grams of Quick acting carbohydrates on hand to eat/drink if your sugar is low - examples are 1/2 cup of juice, coke, or crackers, granola bars.   Remember to eat meals frequently to prevent low blood blood sugars.      Please start taking farxiga 5mg tablet daily -   Take every morning -   Stay hydrated.     Please remember to change insulin pump set/pump site, refill reservoir every 3 days according to  instructions. Longer duration between pump site changes can result in high blood glucose.     Please bolus insulin 15 minutes before you eat meals to avoid spikes and lows in blood glucose.     Please bolus with every snack outside of daily meals.     If you are having sensor issues, please check glucose by finger sticks and input the glucose levels into the pump.     Contact the supplier if you are having sensors or pump issues for troubleshooting and additional supplies.              Pump Back Up Plan:   1.  In case you are not certain the pump or tubing is working correctly, use this plan.  2.  Stop the pump and disconnect the tubing and insertion set.  3.  If you will be off the pump for more than 1-2 hours without a basal rate, you must correct with your short acting insulin (lyumjev) - or switch to a long acting insulin plan.    4.  If the insulin pump is non functional and discontinued for anticipated more than 20 hours, please give daily injections of:  Long acting insulin (Toujeo 40 units daily)      5.  When the insulin pump is restarted, do not restart basal rates until at  "least 22 hours after the last long acting insulin injection. You can set a 0% temporary basal setting that will last until this time and use your pump to bolus for meals and correction.  6.  If glucose continues to rise, or no fresh backup insulin is available, or unable to perform the above instructions. Please be evaluated in the ED.        For any technical insulin pump issues, Contact the insulin pump company representative to troubleshoot the problems. (Help numbers are on the back of the pump device)               Hypoglycemia Treatment - The "15-15 Rule".     If you experience an episode of hypoglycemia (glucose less than 70), follow the 15-15 rule.     Have 15 grams of carbohydrate to raise your blood sugar and recheck it after 15 minutes. If its still below 70 mg/dL, have another serving.     Repeat these steps until your blood sugar is at least 70 mg/dL. Once your blood sugar is back to normal, eat a meal or snack to make sure it doesnt lower again.     This may be:     4 ounces (1/2 cup) of juice or regular soda (not diet)  1 tablespoon of sugar, honey, or corn syrup  Hard candies, jellybeans, or gumdrops--see food label for how many to consume  Make a note about any episodes of low blood sugar and talk with your health care team about why it happened. They can suggest ways to avoid low blood sugar in the future.      Many people tend to want to eat as much as they can until they feel better. This can cause blood sugar levels to shoot way up. Using the step-wise approach of the "15-15 Rule" can help you avoid this, preventing high blood sugar levels.     Note:  When treating a low, the choice of carbohydrate source is important. Complex carbohydrates, or foods that contain fats along with carbs (like chocolate) can slow the absorption of glucose and should not be used to treat an emergency low.     For more information, " visit:  [email or web address]            Please notify your physician or me if  you have persistent low blood glucose <70 or persistent elevated glucose >250 as soon as possible in addition to the steps suggested above.

## 2022-08-10 NOTE — PROGRESS NOTES
66 y.o. male here for 6 week follow up for management of T2dm - ELOISE - with low cpeptide levels in past.   He has been insulin dependent -   Last seen 6/29/222 - those notes below.     His a1c had gone up from 8.1% to 11.5%.   No new labs have been done for today's visit.   History of pancreatitis in 2018 - so he has not used  glp1s or dpp4's.   We have not used sglt2i's on him b/c of ELOISE status - but I am not opposed to using.   See below: cpeptide present, just low.   C-Peptide 0.78 - 5.19 ng/mL 0.67 Low     His Pelon is negative.     Continues on a vgo 30 and we had planned for him to get an omnipod to get better control of insulin needs/hyperglycemic episodes.   Reports giving still just 2 clicks with meals. He's scared to give too many clicks for fear of lows.   He has still not met with the diabetes educator to start on omnipod dash.   has his supplies - dash pods and pdm, and his lyumjev vial - he just not had made an appointment.   He continues to have hyperglycemia.     In the interim - he's suffered with kidney stones and is following with urology -   Also is following closely with woundcare, lymphedema in bilat. Lower extremities.       continues on Dexcom g6 - personal   downloaded personal and reviewed today -   Average glucose is 251 -   27% time in range.   <1% lows.   25% highs.   47% very highs.       Last visit notes as follows from 6/29/2022:   66 y.o. male here for 3 month follow up visit for management of T1dm -   ELOISE - formerly treated as type 2 -   History of pancreatitis.   Last seen in March 2022 - those notes are below.     a1c has been elevated in past, now extremely high   - up from 8.1% now to 11.5%   He's been very stressed since last visit -   Mother in law passed away - he's been very busy cleaning out her house.   Working a lot/sweating - states his vgo patch is just falling off.   He is nervous to be low/go low, so sometimes doesn't click with his meals.   Also got an infection in  "his left foot and in his calf in the interim - it has been treated.   But he isn't sure if his sugars were high prior and that's what caused the infection, or if the infection is what has caused his sugars to go high.     He is on vgo 30 still - using humalog insulin with it.   Usually gives 2 or 4 clicks with meals.   Often only "clicks" 1 or 2 times per day.   He denies any known low sugars, except for if he boluses "too much".   He is not following Ada diet.     Using dexcom g6 cgm - downloaded and reviewed today -   Using with his cellphone - see scanned in .     Average glucose 275 -   15% time in range.   0% lows.   23% highs.   61% very highs        Last visit notes as follows from march 2022:   66 y.o. gentleman, here for 3 month follow up visit oer routine for Eloise - T1dm -   Last seen in December 2021 - those notes are below.     a1c decreased from 8.6% to 8.1%.   He increased from the vgo 20 to vgo 30 - still using humalog insulin   That has helped his overnight sugars, but he is still high during the day.   He often gives 4 clicks instead of 3 clicks with meals.   States sometimes he runs out of insulin in his patch if he gives too much.   We had discussed omnipod at his last visit, but he wanted to try the higher dose vgo patch.   He is not following Ada diet -     On dexcom G6 personal to check his sugars  Downloaded and reviewed today -   See scanned in  -   Average glucose 239 -   30% time in range.   <1% lows.   27% highs.   42% very highs.       Last visit notes from December 2021 -   66 y.o. T1dm - ELOISE - 3 month follow up.   Last seen in Septemer 2021 - those notes are velow.     His a1c remains elevated still @ 8.6%   Continues on VGO 20 -   States using same insulin - humalog -  4 clicks with meals.   No longer on metformin.   He has history of pancreatitis (2018) -  So cannot take glp1 or dpp4's -     He is not following ADA diet.   Eats out often, fried foods, high " carb foods.   States he occasionally has lows when he gives boluses after he eats instead of before the meal.   He complains of right shoulder pain today -   Follows with Dr. Diaz - and is seeing him for kidney stone follow up.   Also c/o ED - has used viagra in past. Requests appt. With Dr. Diaz    Continues on Dexcom G6 - see scanned in .   Average glucose 258 -   24% time in range.   1% lows.   <1% very low. (states following a bolus/correction bolus).   26% highs.   48% extreme highs.       Last visit notes as follows from September 2021:   HPI: Jian Arrieta is a 66 y.o.  male c/I for visit to address Diabetes Type 1 (ELOISE adult onset)   This is the first time I am seeing him for care, follows with Dr. Leon Barajas,  for primary care needs.   Has seen endocrinology - Luisa Garcia NP in past - last visit was 8/9/2021 - those notes are below.  Had seen Dr. Carney and dr. Stauffer with endocrinology in past prior visits.     he is establishing care with me today however.   Met last with GINO Machado 8/23/2021 -     was diagnosed with T2DM originally in 2016 -   He has history of pancreatitis in 2018 - portal vein thrombosis and underwent angioplasty.   now treated as T1 - ELOISE -   Had gallbladder removed as well.     Is on insulin only now via vgo patch - on VGO 20    Has never been hospitalized r/t DM.  Denies missing doses of DM medication - however admits he misses he meal time dose of insulin.     Hgba1c has increased from 7.7%  to 8.6%- now improved to 8.3% currently.   Not always following ADA diet. Eats out 2 - 3 times per day.   Local restaurants/pastas/fried food/waffle house.   vgo 20 - 2 - 3 clicks with breakfast.   4 clicks with lunch and dinner.   1 click with a snack.     He reports forgetting to click sometimes - and waits to click after meals.   Also fearful of hypoglycemia - so will under click.   Working in yard/increased activity or sweating - has low blood sugars.   He  has baqsimi to correct     On dexcom g6 for checking sugars - downloaded and reviewed today -   Average glucose 243  32% time in range.   0% lows.   24% highs.   44% extreme highs.     Complications from diabetes and pertinent co-morbidities include:   Negative for DKA.   Has been on insulin for several years.   + for diabetic neuropathy.   + for nephropathy - CKD stage 3   + microalbuminuria.   Eyes:  negative for Diabetic retinopathy   CV: Denies history of MI nor stroke.   CAD: yes and history of CABG   Also heart failure history   Takes aspirin 81mg tablet daily  BP: has history of HTN  Statin: Taking  ACE/ARB: Not taking    Last notes from RADHA Garcia from August 2021 as follows:   Pt returns for follow up of type 2 diabetes complicated by Charcot foot, foot ulcer-now resolved, kidney stone, BMI 39, CKD stage III, s/p sleeve gastrectomy that is uncontrolled and complicated.  Previously seen by Dr. Carney and Dr. Stauffer and myself. Last visit in April 2021.   He has dentures!!    He had hydrocele repair on 6/16/2021 -- also had kidney stones at the time. Had stent placed and removed. States he has kidney stones on the left and will be seeing Dr. Diaz.      With regards to the diabetes:   Diagnosed with Type 2 DM in 2016  Episode of pancreatitis around 2018,   Family History of Type 2 DM: none  Known complications:   DKA/HHS: none  RN: none; 6/10/21 -  PN: +; was seeing podiatry in the past  Nephropathy: + sees nephrology-- needs appt  Gastroparesis: none  CAD: CABG around 2016  Diabetes complications: CKD stage III, s/p sleeve gastrectomy   Current regimen:  Vgo 20 4 clicks with oatmeal and 3 clicks with breakfast; 4 clicks with lunch and dinner; 1-2 clicks with a snack; sometimes more for a snack   He is not running out of clicks at the end of the day.    He is sometimes taking Vgo off overnight to prevent hypoglycemia. States he is sometimes having hypoglycemia between 1-4AM. States he started doing this a couple  of months ago.Takes off around 9-10PM and wakes up at 4-5AM and puts new Vgo on at 6AM. He is eating around 5-6 AM and puts on Vgo after he eats. In the past he was wearing Vgo for over 24 hours, still doing this.    He is fearful of hypoglycemia and overcorrecting.   Missed doses-missing clicks as above   Other medications tried:  Metformin   He is checking BG 4 times daily  Wearing Dexcom today. See media tab for report  His blood sugars are elevated after breakfast and throughout the day. He is putting on a new device sometimes after he eats breakfast which contributes to his hyperglycemia. He is having hyperglycemia at times overnight and he will take off Vgo.   He forgetting to click at times or clicking after he eats.  On days that he forgets to click, his blood sugars stay persistently high. His blood sugars come down nicely when he clicks.    He reports Dexcom and Vgo is falling off due to sweating badly.   Fasting:    Patient feels unwell with blood sugars less than 110. He loses ability to function with low blood sugars. His lowest blood sugar has been in the 40s (around 2020). Seen at [facility].    Dietary recall: He is not following diabetic diet. Appetite is good. Does not eat as much as before.   Eats 3 meals a day.   B: 5AM-waffle house or oatmeal or grits  L: 11:30  D: 7PM  Snacks: Grazing during the day  Drinks: tea and water    Exercise - tried to stay active.  Active in the yard, garage.    Education - last visit: 2021 -EZEQUIEL Palacios/[place]: has    Social History     Tobacco Use   Smoking Status Former Smoker    Packs/day: 2.00    Years: 30.00    Pack years: 60.00    Types: Cigarettes    Quit date: 2005    Years since quittin.5   Smokeless Tobacco Never Used     Past medical History:   Past Medical History:   Diagnosis Date    Alcohol abuse     Anasarca 2019    Anemia     Anticoagulant long-term use     Arthropathy associated with neurological  disorder 9/2/2015    Atherosclerosis     Charcot foot due to diabetes mellitus     Chronic combined systolic and diastolic heart failure 01/29/2019 1-28-19 Left VentricleModerate decreased ejection fraction at 30%. Normal left ventricular cavity size. Normal wall thickness observed. Grade I (mild) left ventricular diastolic dysfunction consistent with impaired relaxation. Normal left atrial pressure. Moderate, global hypokinesis(see wall scoring diagram). Right VentricleNormal cavity size, wall thickness and ejection fraction. Wall motion n    Chronic pancreatitis 1/28/2019    CKD (chronic kidney disease) stage 3, GFR 30-59 ml/min     CKD (chronic kidney disease) stage 4, GFR 15-29 ml/min     Colon polyps     approx 5 yrs ago    Coronary artery disease due to calcified coronary lesion 05/08/2015    5 stents on ASA      Diabetic polyneuropathy associated with type 2 diabetes mellitus 9/2/2015    Diverticulosis 1/28/2019    DM type 2 with diabetic peripheral neuropathy 2/4/2019    Encounter for blood transfusion     Essential hypertension 1/28/2019    Former smoker 8/26/2015    Healed ulcer of left foot on examination 6/20/2017    Hematuria     Hydrocele     approx 1.5 yrs ago    Hyperphosphatemia     Hypoalbuminemia 2/4/2019    Hypocalcemia     Lymphedema of both lower extremities 1/29/2019    Mixed hyperlipidemia 5/8/2015    Morbid obesity with BMI of 50.0-59.9, adult 5/8/2015    Obstruction of right ureteropelvic junction (UPJ) due to stone 5/24/2021    Onychomycosis of multiple toenails with type 2 diabetes mellitus and peripheral neuropathy 6/20/2017    Perianal cyst     approx 2 yrs ago    Proteinuria     Pseudocyst of pancreas 1/28/2019 1-28-19 Liver has a cirrhotic morphology with no focal lesions.  Significant interval increase in ascites when compared to prior exam which may account for patient's abdominal distension.  Hypodense air-fluid collection along the body of the  pancreas which is slightly smaller when compared to prior CT.  Findings may relate to pancreatic necrosis with pancreatic pseudocysts with infected pseudocyst    Skin cancer     skin cancer    Sleep apnea 8/26/2015    Status post bariatric surgery 1/11/2016    Type 2 diabetes mellitus, with long-term current use of insulin 5/8/2015    Urinary tract infection       Family hx:   Family History   Problem Relation Age of Onset    Cancer Mother     Cancer Father     Heart disease Father     Obesity Sister     Parkinsonism Brother     No Known Problems Paternal Grandmother     Cancer Paternal Grandfather     Cancer Brother     Diabetes Maternal Grandmother     Stroke Maternal Grandfather     Cirrhosis Neg Hx       Current meds:   Current Outpatient Medications:     amoxicillin-clavulanate 875-125mg (AUGMENTIN) 875-125 mg per tablet, Take 1 tablet by mouth every 12 (twelve) hours. for 10 days, Disp: 20 tablet, Rfl: 0    apixaban (ELIQUIS) 5 mg Tab, Take 1 tablet (5 mg total) by mouth 2 (two) times daily., Disp: 60 tablet, Rfl: 11    bumetanide (BUMEX) 1 MG tablet, TAKE 1 TABLET (1 MG TOTAL) BY MOUTH BEFORE BREAKFAST AND 1/2 TABLET (0.5 MG TOTAL) EVERY EVENING., Disp: 135 tablet, Rfl: 3    doxycycline (VIBRA-TABS) 100 MG tablet, Take 1 tablet (100 mg total) by mouth 2 (two) times daily., Disp: 20 tablet, Rfl: 0    insulin glargine, TOUJEO, (TOUJEO SOLOSTAR U-300 INSULIN) 300 unit/mL (1.5 mL) InPn pen, Inject 40 Units into the skin once daily. THIS IF FOR EMERGENCY BACK UP INSULIN USE ONLY - IF YOU ARE OFF OF YOUR VGO OR INSULIN PUMP, GIVE THIS ONCE PER DAY., Disp: 5 pen, Rfl: 1    insulin lispro-aabc (LYUMJEV U-100 INSULIN) 100 unit/mL, Inject 80 Units into the skin continuous. USES IN OMNIPOD INSULIN PUMP. USE AS DIRECTED. DO NOT DIRECTLY INJECT., Disp: 40 mL, Rfl: 3    insulin pump cart,cont inf,BT (OMNIPOD DASH PODS, GEN 4,) Crtg, Inject 1 each into the skin every other day. Change pod every 2  days., Disp: 15 each, Rfl: 11    rosuvastatin (CRESTOR) 5 MG tablet, TAKE 1 TABLET BY MOUTH EVERY DAY, Disp: 90 tablet, Rfl: 3    tamsulosin (FLOMAX) 0.4 mg Cap, Take 1 capsule (0.4 mg total) by mouth once daily., Disp: 30 capsule, Rfl: 1    aspirin (ECOTRIN) 81 MG EC tablet, Take 1 tablet (81 mg total) by mouth once daily., Disp: 9 tablet, Rfl: 0    blood-glucose sensor (DEXCOM G6 SENSOR) Sandi, 9 each by Misc.(Non-Drug; Combo Route) route continuous., Disp: 3 each, Rfl: PRN    blood-glucose transmitter (DEXCOM G6 TRANSMITTER) Sandi, 3 each by Misc.(Non-Drug; Combo Route) route continuous., Disp: 3 each, Rfl: PRN    dapagliflozin (FARXIGA) 5 mg Tab tablet, Take 1 tablet (5 mg total) by mouth once daily., Disp: 30 tablet, Rfl: 3    glucagon, human recombinant, (GLUCAGON EMERGENCY KIT, HUMAN,) 1 mg SolR, Inject 1 mg into the muscle as needed (For severely low sugar). (Patient not taking: Reported on 6/29/2022), Disp: 1 each, Rfl: 2     Current Diabetes medications:   VGO 30 patch - (was on a vgo 20) - giving around 4 clicks with meals.   humalog insulin. (has lyumjev vials now).     Medications Tried and Failed:   Metformin  Hx pancreatitis - cannot use dpp4 or glp1's.     Social:   Lives at home with: wife  Life changes/stressors currently. Did mention his mother in law passed away recently of stroke. Helping clean out her house. Also suffered recent foot and left leg infection.   Diet: not following ADA diet   Meals: 3 per day and snacks.        Breakfast - waffle house special - eggs, biscuit, cheese, grits, hashbrowns.        Lunch - eats out/pastas/rice - red bean plates, etc.        Dinner - eats out every night - [employer]        Snacks 1 - 2 times per day.   Exercise: nothing formally - but works in yard/garage.    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     Glucose Monitoring:   Checking sugars 4x/day via Dexcom g6 - see scanned in .   Gets supplies via mail - has [facility].  "    Standards of care:   Eyes: .: 06/10/2021  Foot exam: : 09/21/2021   Diabetes education: yes - and for vgo start.     Vital Signs  BP (!) 140/78 (BP Location: Right arm, Patient Position: Sitting, BP Method: Large (Manual))   Pulse 81   Temp 97.3 °F (36.3 °C) (Temporal)   Resp 14   Ht 5' 7" (1.702 m)   Wt 116.8 kg (257 lb 8 oz)   SpO2 98%   BMI 40.33 kg/m²     Pertinent Labs:   Hgba1c   Lab Results   Component Value Date    HGBA1C 11.5 (H) 06/22/2022    HGBA1C 8.1 (H) 03/15/2022    HGBA1C 8.6 (H) 12/16/2021     Lipid panel   Lab Results   Component Value Date    CHOL 155 03/15/2022    CHOL 137 12/16/2021    CHOL 138 03/26/2021     Lab Results   Component Value Date    HDL 65 03/15/2022    HDL 55 12/16/2021    HDL 56 03/26/2021     Lab Results   Component Value Date    LDLCALC 77.2 03/15/2022    LDLCALC 70.8 12/16/2021    LDLCALC 64.8 03/26/2021     Lab Results   Component Value Date    TRIG 64 03/15/2022    TRIG 56 12/16/2021    TRIG 86 03/26/2021     Lab Results   Component Value Date    CHOLHDL 41.9 03/15/2022    CHOLHDL 40.1 12/16/2021    CHOLHDL 40.6 03/26/2021      CMP  Glucose   Date Value Ref Range Status   06/22/2022 89 70 - 110 mg/dL Final     BUN   Date Value Ref Range Status   06/22/2022 23 8 - 23 mg/dL Final     Creatinine   Date Value Ref Range Status   06/22/2022 1.8 (H) 0.5 - 1.4 mg/dL Final     eGFR if    Date Value Ref Range Status   06/22/2022 44.0 (A) >60 mL/min/1.73 m^2 Final     eGFR if non    Date Value Ref Range Status   06/22/2022 38.1 (A) >60 mL/min/1.73 m^2 Final     Comment:     Calculation used to obtain the estimated glomerular filtration  rate (eGFR) is the CKD-EPI equation.        AST   Date Value Ref Range Status   06/22/2022 57 (H) 10 - 40 U/L Final     ALT   Date Value Ref Range Status   06/22/2022 60 (H) 10 - 44 U/L Final     Microalbumin creatinine ratio:   Lab Results   Component Value Date    MICALBCREAT 2502.2 (H) 03/15/2022 "       Review Of Systems:   Gen: Appetite good, no weight gain or loss, + fatigue, Denies polydipsia.  Skin: Skin is intact and heals well, denies any rashes or hair changes.   EENT: Denies any acute visual disturbances, nor blurred vision.   Resp: Denies SOB or Dyspnea on exertion, denies cough.   Cardiac: Denies chest pain, palpitations, + 2 edema to bilat. Lower ext.   GI: Denies abdominal pain, nausea or vomiting, diarrhea, or constipation.   /GYN: Denies nocturia, nor burning, frequency or pain on urination. + c/o ED still.   MS/Neuro: Denies numbness/ tingling in BLE; Gait unsteady, speech clear  Psych: Denies drug/ETOH abuse, no hx of depression.  Other systems: negative.    Physical Exam:   GENERAL: Well developed, well nourished in appearance.   PSYCH: AAOx3, appropriate mood and affect, pleasant expression, conversant, appears relaxed, well groomed.   EYES: PERRL, Conjunctiva and corneas clear  NECK: Soft and Supple, trachea midline  CHEST: Even, regular, and unlabored respirations  ABDOMEN: Soft, non-tender, non-distended.   VASCULAR: pedal pulses palpable bilaterally,+ edema dependent bilaterally.   NEURO:  cranial nerves II - XII intact   MUSCULOSKELETAL: Good ROM, steady gait.   SKIN: Skin warm, dry, and intact -     Assessment and Plan of Care:     Jian was seen today for diabetes and follow-up.    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       1. T1 - ELOISE - formerly treated as T2DM with hyperglycemia- Hgba1c goal is 7.5% or less without hypoglycemia - 7.6%---> 8.6%--> 8.3%--> 8.6% --> 8.1%  --> 11. 5% current. Recheck in 6 weeks time.   discussed DM, progression of disease, long term complications, CV risk factors and tx options.   Advise compliance with ADA diet and encourage exercise-   Continue on VGO 30 - increase clicks - try 4 - 6 clicks with meals -   SWITCH TO OMNIPOD DASH.   The vgo keeps falling off - and he doesn't get his insulin.   He's on humalog, I am going to try him on lyumjev to see if this will help with onset of action time and more prandial control.  He now has the lyumjev vials.    HE HAS OMNIPOD AND PDM, JUST NEEDS TO MEET WITH EDUCATOR TO START.   HE GETS HIS PODS FROM pharmacy.   Staring him on farxiga 5mg tablet daily,.   Voucher provided for first 30 tabs free - to trial. Advised to call me if any lows.   Discussed MOA, possible side effects including yeast infection, UTI, dehydration and ketoacidosis, importance of maintaining hydration and avoiding No carb diets. Good use hygiene. Notify my office if any side effects. Will monitor renal function closely. Stable at present.   He has had pump eval in past and is willing to/ready to switch - will order presets for him for ease of use. See settings below  I sent in rx for back up insulin pens. He has pen needles already.   Reviewed  hypoglycemia, s/s and appropriate tx. Have/get quick acting glucose tablets at hand.   Instructed to monitor Blood glucose 2 - 4x/day and bring meter/ log to every clinic visit.   Continue on dexcom G6 personal cgm.   A CGM is MEDICALLY NECESSARY as it will allow me to virtually and more closely monitor glucose readings  This enables me to make any necessary adjustments to the patients diabetes  regimen while keeping the patient and staff safe during the COVID-19 PHE.    2. HTN & Heart failure - usually controlled, did not take meds yet today -    continue meds as previously prescribed and monitor.   + urine mac  History of afib.   + history of CAD and hx cabg, as well as venous insufficiency bilat. Lower extremeties.   Last saw cardiology - Dr. Saavedra on 6/14/22.      3. Lipids- LDL goal < 100. At goal.   Currently on statin therapy    4. Weight - BMI Body mass index is 40.33 kg/m².   Encourage Ada diet and exercise as tolerated.   Former weight loss surgery.   Cannot do glp1 b/c of history of pancreatitis. I will consider it though in future considering his high risk profile.     5. Renal Function - ckd - stage 3 -   + microalbuminuria. No changes.   Starting sglt2i for protection and bg control.     6,. Recent left leg and foot infection - healing/likely r/t hyperglycemia.    hx venous insufficiency and lymphedema.     7 hx fatty liver disease, cirrhosis, and resultatnt - pancreatitis - has followed with liver -   Right now stable trends -       Settings to be followed for Omnipod dash start -   Basal rate 1.1 units/hour.   Insulin to carb ratio 1:8 - he does not carb count though.   ISF 40   Max basal rate of 2 units/hour.   bg target of 120   Correct for bg over 180   Preset boluses - he does not carb count  2 units - snack.   4 units small meal.   6 units medium meal.   8 units large meal.     Start farxig 5mg tablet daily.   Meet with DE to switch from vgo to omnipod dash.   Presets only - see settings scanned in .   Follow up in 8 weeks after pump transition.

## 2022-08-12 ENCOUNTER — HOSPITAL ENCOUNTER (OUTPATIENT)
Dept: WOUND CARE | Facility: HOSPITAL | Age: 67
Discharge: HOME | End: 2022-08-12
Attending: STUDENT IN AN ORGANIZED HEALTH CARE EDUCATION/TRAINING PROGRAM
Payer: MEDICARE

## 2022-08-12 VITALS
HEART RATE: 69 BPM | SYSTOLIC BLOOD PRESSURE: 165 MMHG | BODY MASS INDEX: 40.34 KG/M2 | TEMPERATURE: 98 F | HEIGHT: 67 IN | DIASTOLIC BLOOD PRESSURE: 74 MMHG | WEIGHT: 257 LBS

## 2022-08-12 DIAGNOSIS — L97.909 VENOUS ULCER: Primary | ICD-10-CM

## 2022-08-12 DIAGNOSIS — E11.42 DIABETIC POLYNEUROPATHY ASSOCIATED WITH TYPE 2 DIABETES MELLITUS: ICD-10-CM

## 2022-08-12 DIAGNOSIS — I83.009 VENOUS ULCER: Primary | ICD-10-CM

## 2022-08-12 DIAGNOSIS — I87.2 VENOUS INSUFFICIENCY OF BOTH LOWER EXTREMITIES: ICD-10-CM

## 2022-08-12 DIAGNOSIS — I89.0 LYMPHEDEMA OF BOTH LOWER EXTREMITIES: Chronic | ICD-10-CM

## 2022-08-12 PROCEDURE — 99499 NO LOS: ICD-10-PCS | Mod: ,,, | Performed by: STUDENT IN AN ORGANIZED HEALTH CARE EDUCATION/TRAINING PROGRAM

## 2022-08-12 PROCEDURE — 87070 CULTURE OTHR SPECIMN AEROBIC: CPT | Performed by: STUDENT IN AN ORGANIZED HEALTH CARE EDUCATION/TRAINING PROGRAM

## 2022-08-12 PROCEDURE — 11042 DBRDMT SUBQ TIS 1ST 20SQCM/<: CPT | Mod: ,,, | Performed by: STUDENT IN AN ORGANIZED HEALTH CARE EDUCATION/TRAINING PROGRAM

## 2022-08-12 PROCEDURE — 11042 DEBRIDEMENT: ICD-10-PCS | Mod: ,,, | Performed by: STUDENT IN AN ORGANIZED HEALTH CARE EDUCATION/TRAINING PROGRAM

## 2022-08-12 PROCEDURE — 87075 CULTR BACTERIA EXCEPT BLOOD: CPT | Performed by: STUDENT IN AN ORGANIZED HEALTH CARE EDUCATION/TRAINING PROGRAM

## 2022-08-12 PROCEDURE — 99499 UNLISTED E&M SERVICE: CPT | Mod: ,,, | Performed by: STUDENT IN AN ORGANIZED HEALTH CARE EDUCATION/TRAINING PROGRAM

## 2022-08-12 PROCEDURE — 87186 SC STD MICRODIL/AGAR DIL: CPT | Performed by: STUDENT IN AN ORGANIZED HEALTH CARE EDUCATION/TRAINING PROGRAM

## 2022-08-12 PROCEDURE — 11042 DBRDMT SUBQ TIS 1ST 20SQCM/<: CPT

## 2022-08-12 PROCEDURE — 87077 CULTURE AEROBIC IDENTIFY: CPT | Performed by: STUDENT IN AN ORGANIZED HEALTH CARE EDUCATION/TRAINING PROGRAM

## 2022-08-12 NOTE — PROCEDURES
Debridement    Date/Time: 8/5/2022 7:55 AM  Performed by: Savanah Felton DPM  Authorized by: Savanah Felton DPM     Consent Done?:  Yes (Verbal)  Local anesthesia used?: No      Wound Details:    Location:  Left leg    Type of Debridement:  Excisional       Length (cm):  3.5       Area (sq cm):  8.75       Width (cm):  2.5       Percent Debrided (%):  100       Depth (cm):  0.2       Total Area Debrided (sq cm):  8.75    Depth of debridement:  Subcutaneous tissue    Tissue debrided:  Subcutaneous and Other    Devitalized tissue debrided:  Biofilm, Callus and Fibrin    Instruments:  Blade and Curette    Bleeding:  Minimal  Patient tolerance:  Patient tolerated the procedure well with no immediate complications     No cultures were taken during this visit

## 2022-08-12 NOTE — PROGRESS NOTES
[facility]    Subjective:       Patient ID: Jian Arrieta is a 66 y.o. male.    Chief Complaint: Non-healing Wound Follow Up    8/12/22: Follow up today for LLE wound and BLE lymphedema. Presents to clinic without compression wraps to both legs, only hydrofera ready and coban over wound.   States he removed wraps yesterday due to getting them full of mud working in the yard.  Sharps debridement and culture taken LLE wound. Patient educated on use and purpose of compression dressings and to call wound care clinic for dressing changes if dressings become soiled for nurse visits. Patient verbalized understanding. Continued with same treatment plan.     Review of Systems   Constitutional: Negative for activity change, chills, diaphoresis and fever.   HENT: Negative for congestion and hearing loss.    Respiratory: Negative for cough and shortness of breath.    Cardiovascular: Positive for leg swelling.   Gastrointestinal: Negative for nausea and vomiting.   Skin: Positive for wound. Negative for color change, pallor and rash.   Neurological: Positive for numbness. Negative for tremors, speech difficulty and weakness.   Psychiatric/Behavioral: Negative for agitation and confusion. The patient is not nervous/anxious.          Objective:        Physical Exam  Constitutional:       General: He is not in acute distress.     Appearance: Normal appearance. He is well-developed. He is not diaphoretic.   Cardiovascular:      Comments: Dorsalis pedis and posterior tibial pulses are mildly diminished. Skin temperature is within normal limits. Toes are cool to touch and feet are warm proximally. Hair growth is diminished. Skin is atrophic and with mild hyperpigmentation. Pitting edema noted, bilaterally.   Musculoskeletal:         General: No tenderness.      Comments: Adequate joint range of motion without pain, limitation, nor crepitation to foot and ankle joints. Muscle strength  is 5/5 in all groups bilaterally.       Feet:      Right foot:      Skin integrity: Dry skin present. No ulcer, blister, erythema or warmth.      Left foot:      Skin integrity: Dry skin present. No ulcer, blister, erythema or warmth.   Skin:     General: Skin is warm and dry.      Capillary Refill: Capillary refill takes less than 2 seconds.      Comments: Skin is warm and dry, no acute signs of infection noted bilaterally      See wound description below    Toenails are thickened by 2-4 mm's, dystrophic, and are darkened in coloration with subungual fungal debris.     Otherwise, no open wounds, macerations or hyperkeratotic lesions, bilaterally            Neurological:      Mental Status: He is alert and oriented to person, place, and time.      Sensory: Sensory deficit present.      Motor: No abnormal muscle tone.      Comments: Light touch within normal limits.    Psychiatric:         Mood and Affect: Mood normal.         Behavior: Behavior normal.         Thought Content: Thought content normal.            Wound 07/15/22 0900 Skin Tear Left lower Leg (Active)   07/15/22 0900    Pre-existing: Yes   Primary Wound Type: Skin tear   Side: Left   Orientation: lower   Location: Leg   Wound Number:    Ankle-Brachial Index:    Pulses:    Removal Indication and Assessment:    Wound Outcome:    (Retired) Wound Type:    (Retired) Wound Length (cm):    (Retired) Wound Width (cm):    (Retired) Depth (cm):    Wound Description (Comments):    Removal Indications:    Wound Image    08/12/22 1100   Dressing Appearance Intact;Dried drainage 08/12/22 1100   Drainage Amount Small 08/12/22 1100   Drainage Characteristics/Odor Serosanguineous 08/12/22 1100   Appearance Red;Yellow;Moist 08/12/22 1100   Tissue loss description Full thickness 08/12/22 1100   Red (%), Wound Tissue Color 90 % 08/12/22 1100   Yellow (%), Wound Tissue Color 10 % 08/12/22 1100   Periwound Area Edematous;Dry;[place] 08/12/22 1100   Wound Edges Irregular  08/12/22 1100   Wound Length (cm) 3 cm 08/12/22 1100   Wound Width (cm) 2 cm 08/12/22 1100   Wound Depth (cm) 0.1 cm 08/12/22 1100   Wound Volume (cm^3) 0.6 cm^3 08/12/22 1100   Wound Surface Area (cm^2) 6 cm^2 08/12/22 1100   Care Cleansed with:;Sterile normal saline;Debrided 08/12/22 1100   Dressing Applied;Hydrofiber;Compression wrap;Tubular bandage 08/12/22 1100   Periwound Care Absorptive dressing applied;Topical treatment applied 08/12/22 1100   Compression Four layer compression;Tubular elasticized bandage 08/12/22 1100   Dressing Change Due 08/19/22 08/12/22 1100            Wound 07/29/22 0919  Right anterior;lower Leg (Active)   07/29/22 0919    Pre-existing: Yes   Primary Wound Type:    Side: Right   Orientation: anterior;lower   Location: Leg   Wound Number:    Ankle-Brachial Index:    Pulses:    Removal Indication and Assessment:    Wound Outcome:    (Retired) Wound Type:    (Retired) Wound Length (cm):    (Retired) Wound Width (cm):    (Retired) Depth (cm):    Wound Description (Comments):    Removal Indications:    Wound Image   08/12/22 1100   Dressing Appearance Open to air 08/12/22 1100   Periwound Area Edematous;Dry 08/12/22 1100   Wound Length (cm) 0 cm 08/12/22 1100   Wound Width (cm) 0 cm 08/12/22 1100   Wound Depth (cm) 0 cm 08/12/22 1100   Wound Volume (cm^3) 0 cm^3 08/12/22 1100   Wound Surface Area (cm^2) 0 cm^2 08/12/22 1100   Care Cleansed with:;Soap and water 08/12/22 1100   Dressing Applied;Compression wrap;Tubular bandage 08/12/22 1100   Periwound Care Topical treatment applied 08/12/22 1100   Compression Four layer compression;Tubular elasticized bandage 08/12/22 1100   Dressing Change Due 08/19/22 08/12/22 1100         Assessment:         ICD-10-CM ICD-9-CM   1. Lymphedema of both lower extremities  I89.0 457.1   2. Venous insufficiency of both lower extremities  I87.2 459.81   3. Diabetic polyneuropathy associated with type 2 diabetes mellitus  E11.42 250.60     357.2         Plan:    Tissue pathology and/or culture taken:  [x] Yes [] No   Sharp debridement performed:   [x] Yes [] No   Labs ordered this visit:   [] Yes [x] No   Imaging ordered this visit:   [] Yes [x] No           Orders Placed This Encounter   Procedures    Anaerobic culture          CULTURE, AEROBIC  (SPECIFY SOURCE)          Change dressing     Left lower leg skin tear:   Cleanse with: Normal saline   Lidocaine: prn   Silver nitrate: prn   Periwound care: Gentian Violet, Sween and Betamethasone PRN itching  Primary dressing: Hydrofera Ready  Secondary dressing: Profore BLE toes to knee   Edema control: Profore BLE, Tubigrip F BLE   Frequency: Weekly   Follow-up: Dr. Felton 8/19/22 and ID for abx recommendation    Wound Culture taken 8/12/22        -Wound care per above. Discussed importance of keeping dressings clean, dry and intact for wound healing.   -Cultures taken today, has appointment with Infectious Disease scheduled.  -Rest, elevate, avoid salt intake. Continue follow up with PCP and Cardiology for leg swelling  -Follow up with Cardiology for venous stasis  -Shoe inspection. Diabetic Foot Education. Patient reminded of the importance of good nutrition and blood sugar control to help prevent podiatric complications of diabetes. Inspect feet daily for skin breaks, blisters, swelling, or redness. Wear cotton socks (preferably white)  Change socks every day. Do NOT walk barefoot. Do NOT use heating pads or warm/hot water soaks. I discussed with the  patient  signs and symptoms of infection including redness, drainage, purulence, odor, pain, elevated BS, streaking, fever, chills, etc . Patient is to seek medical attention (ER or urgent care) if these symptoms occur      Follow up in about 1 week (around 8/19/2022) for  and ID.

## 2022-08-15 NOTE — PROCEDURES
Debridement    Date/Time: 8/12/2022 10:45 AM  Performed by: Savanah Felton DPM  Authorized by: Savanah Felton DPM     Consent Done?:  Yes (Verbal)  Local anesthesia used?: No      Wound Details:    Location:  Left leg    Type of Debridement:  Excisional       Length (cm):  3       Area (sq cm):  6       Width (cm):  2       Percent Debrided (%):  100       Depth (cm):  0.1       Total Area Debrided (sq cm):  6    Depth of debridement:  Subcutaneous tissue    Tissue debrided:  Subcutaneous and Other    Devitalized tissue debrided:  Biofilm, Callus and Fibrin    Instruments:  Blade and Curette    Bleeding:  Minimal  Patient tolerance:  Patient tolerated the procedure well with no immediate complications     cultures were taken during this visit

## 2022-08-16 LAB
BACTERIA SPEC AEROBE CULT: ABNORMAL
BACTERIA SPEC ANAEROBE CULT: NORMAL

## 2022-08-17 ENCOUNTER — CLINICAL SUPPORT (OUTPATIENT)
Dept: DIABETES | Facility: CLINIC | Age: 67
End: 2022-08-17
Payer: MEDICARE

## 2022-08-17 DIAGNOSIS — E11.40 TYPE 2 DIABETES MELLITUS WITH DIABETIC NEUROPATHY, WITH LONG-TERM CURRENT USE OF INSULIN: Chronic | ICD-10-CM

## 2022-08-17 DIAGNOSIS — Z79.4 TYPE 2 DIABETES MELLITUS WITH DIABETIC NEUROPATHY, WITH LONG-TERM CURRENT USE OF INSULIN: Chronic | ICD-10-CM

## 2022-08-17 PROCEDURE — G0108 PR DIAB MANAGE TRN  PER INDIV: ICD-10-PCS | Mod: S$GLB,,, | Performed by: DIETITIAN, REGISTERED

## 2022-08-17 PROCEDURE — G0108 DIAB MANAGE TRN  PER INDIV: HCPCS | Mod: S$GLB,,, | Performed by: DIETITIAN, REGISTERED

## 2022-08-17 NOTE — PROGRESS NOTES
Diabetes Care Specialist Progress Note  Author: Lori Rush RD, CDE  Date: 8/17/2022    Program Intake  Reason for Diabetes Program Visit:: Intervention  Type of Intervention:: Individual  Individual: Device Training  Device Training: Insulin Pump Start (Omnipod Dash)    Lab Results   Component Value Date    HGBA1C 11.5 (H) 06/22/2022       Diabetes Self-Management Skills Assessment    Patient referred for Omnipod Dash start. Was on VGO 30. Basal rate 1.1u/hr. Presets - 2 units snacks, 4 units small meal, 6 units medium meal and 8 units large meal. Instructed patient on how to set up and change out pod. Instructed patient on how to bolus using presets. Unable to get patient set up with [facility]der central and [employer] - he was not able to access his email to complete the set up. Will have to upload for now when he comes in.    Assessment Summary and Plan    Based on today's diabetes care assessment, the following areas of need were identified:      Social 4/19/2021   Support No   Access to Mass Media/Tech No   Cognitive/Behavioral Health No   Culture/Catholic No   Communication No   Health Literacy No        Clinical 8/23/2021   Medication Adherence Yes   Lab Compliance No   Nutritional Status No                Today's interventions were provided through individual discussion, instruction, and written materials were provided.      Patient verbalized understanding of instruction and written materials.  Pt was able to return back demonstration of instructions today. Patient understood key points, needs reinforcement and further instruction.     Diabetes Self-Management Care Plan:    Today's Diabetes Self-Management Care Plan was developed with Jian's input. Jian has agreed to work toward the following goal(s) to improve his/her overall diabetes control.      Care Plan: Diabetes Management   Updates made since 7/18/2022 12:00 AM      Problem: Medications       Goal: Patient Agrees to use VGo as instructed: will give clicks  15 minutes prior to eating; will change VGo at same time every day    Start Date: 4/19/2021   Expected End Date: 4/19/2022   This Visit's Progress: Met   Recent Progress: On track   Priority: High   Barriers: Lack of Motivation to Change   Note:    Patient switched to Omnipod Dash today     Problem: Medications       Goal: Use Omnipod as instructed    Start Date: 8/17/2022   Expected End Date: 12/16/2022   Priority: Medium   Barriers: No Barriers Identified            Task: Instructed patient on use of the Omnipod Completed 8/17/2022             Follow Up Plan     Follow up in about 27 days (around 9/13/2022).    Today's care plan and follow up schedule was discussed with patient.  Jian verbalized understanding of the care plan, goals, and agrees to follow up plan.        The patient was encouraged to communicate with his/her health care provider/physician and care team regarding his/her condition(s) and treatment.  I provided the patient with my contact information today and encouraged to contact me via phone or [facility] Patient Portal as needed.     Length of Visit   Total Time: 90 Minutes

## 2022-08-19 ENCOUNTER — CLINICAL SUPPORT (OUTPATIENT)
Dept: INFECTIOUS DISEASES | Facility: CLINIC | Age: 67
End: 2022-08-19
Payer: MEDICARE

## 2022-08-19 ENCOUNTER — HOSPITAL ENCOUNTER (OUTPATIENT)
Dept: WOUND CARE | Facility: HOSPITAL | Age: 67
Discharge: HOME | End: 2022-08-19
Attending: STUDENT IN AN ORGANIZED HEALTH CARE EDUCATION/TRAINING PROGRAM
Payer: MEDICARE

## 2022-08-19 VITALS
SYSTOLIC BLOOD PRESSURE: 142 MMHG | HEART RATE: 69 BPM | TEMPERATURE: 98 F | HEIGHT: 67 IN | DIASTOLIC BLOOD PRESSURE: 64 MMHG | WEIGHT: 257 LBS | BODY MASS INDEX: 40.34 KG/M2

## 2022-08-19 DIAGNOSIS — I87.2 VENOUS INSUFFICIENCY OF BOTH LOWER EXTREMITIES: Primary | ICD-10-CM

## 2022-08-19 DIAGNOSIS — E11.40 TYPE 2 DIABETES MELLITUS WITH DIABETIC NEUROPATHY, WITH LONG-TERM CURRENT USE OF INSULIN: Chronic | ICD-10-CM

## 2022-08-19 DIAGNOSIS — I50.42 CHRONIC COMBINED SYSTOLIC AND DIASTOLIC HEART FAILURE: ICD-10-CM

## 2022-08-19 DIAGNOSIS — L08.9 LOCAL INFECTION OF WOUND: ICD-10-CM

## 2022-08-19 DIAGNOSIS — I89.0 LYMPHEDEMA OF BOTH LOWER EXTREMITIES: Primary | Chronic | ICD-10-CM

## 2022-08-19 DIAGNOSIS — A49.01 MSSA INFECTION, NON-INVASIVE: ICD-10-CM

## 2022-08-19 DIAGNOSIS — A49.8 PSEUDOMONAS AERUGINOSA INFECTION: ICD-10-CM

## 2022-08-19 DIAGNOSIS — I87.2 VENOUS INSUFFICIENCY OF BOTH LOWER EXTREMITIES: ICD-10-CM

## 2022-08-19 DIAGNOSIS — T14.8XXA LOCAL INFECTION OF WOUND: ICD-10-CM

## 2022-08-19 DIAGNOSIS — L97.909 VENOUS ULCER: ICD-10-CM

## 2022-08-19 DIAGNOSIS — Z79.4 TYPE 2 DIABETES MELLITUS WITH DIABETIC NEUROPATHY, WITH LONG-TERM CURRENT USE OF INSULIN: Chronic | ICD-10-CM

## 2022-08-19 DIAGNOSIS — I83.009 VENOUS ULCER: ICD-10-CM

## 2022-08-19 PROCEDURE — 11042 DBRDMT SUBQ TIS 1ST 20SQCM/<: CPT

## 2022-08-19 PROCEDURE — 11042 DBRDMT SUBQ TIS 1ST 20SQCM/<: CPT | Mod: ,,, | Performed by: STUDENT IN AN ORGANIZED HEALTH CARE EDUCATION/TRAINING PROGRAM

## 2022-08-19 PROCEDURE — 97597 DBRDMT OPN WND 1ST 20 CM/<: CPT

## 2022-08-19 PROCEDURE — 99213 OFFICE O/P EST LOW 20 MIN: CPT | Mod: S$GLB,,, | Performed by: INTERNAL MEDICINE

## 2022-08-19 PROCEDURE — 99214 PR OFFICE/OUTPT VISIT, EST, LEVL IV, 30-39 MIN: ICD-10-PCS | Mod: 25,,, | Performed by: STUDENT IN AN ORGANIZED HEALTH CARE EDUCATION/TRAINING PROGRAM

## 2022-08-19 PROCEDURE — 99213 PR OFFICE/OUTPT VISIT, EST, LEVL III, 20-29 MIN: ICD-10-PCS | Mod: S$GLB,,, | Performed by: INTERNAL MEDICINE

## 2022-08-19 PROCEDURE — 11042 DEBRIDEMENT: ICD-10-PCS | Mod: ,,, | Performed by: STUDENT IN AN ORGANIZED HEALTH CARE EDUCATION/TRAINING PROGRAM

## 2022-08-19 PROCEDURE — 99214 OFFICE O/P EST MOD 30 MIN: CPT | Mod: 25,,, | Performed by: STUDENT IN AN ORGANIZED HEALTH CARE EDUCATION/TRAINING PROGRAM

## 2022-08-19 NOTE — PROGRESS NOTES
[facility]    Subjective:       Patient ID: Jian Arrieta is a 67 y.o. male.    Chief Complaint: Non-healing Wound Follow Up    8/19/22: Follow up lle wound. Wound improving. Selective debridement tolerated well. New wound care orders initiated verbalized plan of care. Bactrim po sent to pharmacy per Dr. Mendoza. Follow up 1 week. Will change back to 4 layer compression if wound regresses at next visit.     Review of Systems   Constitutional: Negative for activity change, chills, diaphoresis and fever.   HENT: Negative for congestion and hearing loss.    Respiratory: Negative for cough and shortness of breath.    Cardiovascular: Positive for leg swelling.   Gastrointestinal: Negative for nausea and vomiting.   Skin: Positive for wound. Negative for color change, pallor and rash.   Neurological: Positive for numbness. Negative for tremors, speech difficulty and weakness.   Psychiatric/Behavioral: Negative for agitation and confusion. The patient is not nervous/anxious.          Objective:        Physical Exam  Constitutional:       General: He is not in acute distress.     Appearance: Normal appearance. He is well-developed. He is not diaphoretic.   Cardiovascular:      Comments: Dorsalis pedis and posterior tibial pulses are mildly diminished. Skin temperature is within normal limits. Toes are cool to touch and feet are warm proximally. Hair growth is diminished. Skin is atrophic and with mild hyperpigmentation. Pitting edema noted, bilaterally.   Musculoskeletal:         General: No tenderness.      Comments: Adequate joint range of motion without pain, limitation, nor crepitation to foot and ankle joints. Muscle strength is 5/5 in all groups bilaterally.       Feet:      Right foot:      Skin integrity: Dry skin present. No ulcer, blister, erythema or warmth.      Left foot:      Skin integrity: Dry skin present. No ulcer, blister, erythema or warmth.   Skin:      General: Skin is warm and dry.      Capillary Refill: Capillary refill takes less than 2 seconds.      Comments: Skin is warm and dry, no acute signs of infection noted bilaterally      See wound description below    Toenails are thickened by 2-4 mm's, dystrophic, and are darkened in coloration with subungual fungal debris.     Otherwise, no open wounds, macerations or hyperkeratotic lesions, bilaterally            Neurological:      Mental Status: He is alert and oriented to person, place, and time.      Sensory: Sensory deficit present.      Motor: No abnormal muscle tone.      Comments: Light touch within normal limits.    Psychiatric:         Mood and Affect: Mood normal.         Behavior: Behavior normal.         Thought Content: Thought content normal.            Wound 07/15/22 0900 Skin Tear Left lower Leg (Active)   07/15/22 0900    Pre-existing: Yes   Primary Wound Type: Skin tear   Side: Left   Orientation: lower   Location: Leg   Wound Number:    Ankle-Brachial Index:    Pulses:    Removal Indication and Assessment:    Wound Outcome:    (Retired) Wound Type:    (Retired) Wound Length (cm):    (Retired) Wound Width (cm):    (Retired) Depth (cm):    Wound Description (Comments):    Removal Indications:    Wound Image   08/19/22 1000   Dressing Appearance Intact;Moist drainage 08/19/22 1000   Drainage Amount Scant 08/19/22 1000   Drainage Characteristics/Odor Serosanguineous 08/19/22 1000   Appearance Red;Moist 08/19/22 1000   Tissue loss description Partial thickness 08/19/22 1000   Red (%), Wound Tissue Color 100 % 08/19/22 1000   Periwound Area Edematous;Satellite lesion 08/19/22 1000   Wound Edges Irregular 08/19/22 1000   Wound Length (cm) 0.3 cm 08/19/22 1000   Wound Width (cm) 0.5 cm 08/19/22 1000   Wound Depth (cm) 0.1 cm 08/19/22 1000   Wound Volume (cm^3) 0.015 cm^3 08/19/22 1000   Wound Surface Area (cm^2) 0.15 cm^2 08/19/22 1000   Care Cleansed with:;Soap and water;Debrided 08/19/22 1000    Dressing Applied;Hydrofiber;[place]/border;Tubular bandage 08/19/22 1000   Periwound Care Absorptive dressing applied;Topical treatment applied 08/19/22 1000   Compression Tubular elasticized bandage 08/19/22 1000   Dressing Change Due 08/21/22 08/19/22 1000            Wound 07/29/22 0919  Right anterior;lower Leg (Active)   07/29/22 0919    Pre-existing: Yes   Primary Wound Type:    Side: Right   Orientation: anterior;lower   Location: Leg   Wound Number:    Ankle-Brachial Index:    Pulses:    Removal Indication and Assessment:    Wound Outcome:    (Retired) Wound Type:    (Retired) Wound Length (cm):    (Retired) Wound Width (cm):    (Retired) Depth (cm):    Wound Description (Comments):    Removal Indications:    Dressing Appearance Dry;Intact 08/19/22 1000   Periwound Area Edematous 08/19/22 1000   Wound Length (cm) 0 cm 08/19/22 1000   Wound Width (cm) 0 cm 08/19/22 1000   Wound Depth (cm) 0 cm 08/19/22 1000   Wound Volume (cm^3) 0 cm^3 08/19/22 1000   Wound Surface Area (cm^2) 0 cm^2 08/19/22 1000   Care Cleansed with:;Soap and water 08/19/22 1000   Dressing Applied;Tubular bandage 08/19/22 1000   Compression Four layer compression 08/19/22 1000   Dressing Change Due 08/26/22 08/19/22 1000         Assessment:         ICD-10-CM ICD-9-CM   1. Lymphedema of both lower extremities  I89.0 457.1   2. Venous insufficiency of both lower extremities  I87.2 459.81   3. Type 2 diabetes mellitus with diabetic neuropathy, with long-term current use of insulin  E11.40 250.60    Z79.4 357.2     V58.67         Plan:   Tissue pathology and/or culture taken:  [] Yes [x] No   Sharp debridement performed:   [x] Yes [] No   Labs ordered this visit:   [] Yes [x] No   Imaging ordered this visit:   [] Yes [x] No           Orders Placed This Encounter   Procedures    Change dressing     Left lower leg skin tear and satellite wound:   Cleanse with: Normal saline   Lidocaine: prn   Silver nitrate: prn   Periwound care: Gentian Violet  PRN,  Sween and Betamethasone PRN itching   Primary dressing: Hydrofera Ready   Secondary dressing: Mepliex border  Edema control:  Tubigrip F BLE toe to knee  Frequency: Every other day per patient   Follow-up: Dr. Felton 8/26/22   Bactrim po sent  to pharmacy per ID 8/19/22.        -Wound care per above. Discussed importance of keeping dressings clean, dry and intact for wound healing.   -Seen today and case discussed with Infectious Disease, appreciative recommendations   -Rest, elevate, avoid salt intake. Continue follow up with PCP and Cardiology for leg swelling  -Follow up with Cardiology for venous stasis  -Shoe inspection. Diabetic Foot Education. Patient reminded of the importance of good nutrition and blood sugar control to help prevent podiatric complications of diabetes. Inspect feet daily for skin breaks, blisters, swelling, or redness. Wear cotton socks (preferably white)  Change socks every day. Do NOT walk barefoot. Do NOT use heating pads or warm/hot water soaks. I discussed with the  patient  signs and symptoms of infection including redness, drainage, purulence, odor, pain, elevated BS, streaking, fever, chills, etc . Patient is to seek medical attention (ER or urgent care) if these symptoms occur      Follow up in about 1 week (around 8/26/2022) for .

## 2022-08-19 NOTE — PROGRESS NOTES
Patient referred for evaluation on wound with positive wound culture. Patient has left lower leg wound from chronic lymphedema. Cultures on 7/29 grew MSSA and Pseudomonas. Cultures from 8/12 grew Pseudomonas sensitive to Cipro and Bactrim.    Patient denies fever or chills. Wound is healing with no evidence of erythema or tenderness.        1. Venous insufficiency of both lower extremities    2. Chronic combined systolic and diastolic heart failure    3. Local infection of wound    4. Pseudomonas aeruginosa infection    5. MSSA infection, non-invasive        Discussed with patient and podiatry at bedside. Will treat with Bactrim DS for one week. Emphasized edema control with compression and elevation. I spent over 20 minutes on this encounter today.

## 2022-08-24 ENCOUNTER — PATIENT OUTREACH (OUTPATIENT)
Dept: ADMINISTRATIVE | Facility: HOSPITAL | Age: 67
End: 2022-08-24
Payer: MEDICARE

## 2022-08-26 ENCOUNTER — HOSPITAL ENCOUNTER (OUTPATIENT)
Dept: WOUND CARE | Facility: HOSPITAL | Age: 67
Discharge: HOME | End: 2022-08-26
Attending: STUDENT IN AN ORGANIZED HEALTH CARE EDUCATION/TRAINING PROGRAM
Payer: MEDICARE

## 2022-08-26 VITALS
DIASTOLIC BLOOD PRESSURE: 72 MMHG | SYSTOLIC BLOOD PRESSURE: 131 MMHG | HEART RATE: 84 BPM | WEIGHT: 257 LBS | HEIGHT: 67 IN | TEMPERATURE: 98 F | BODY MASS INDEX: 40.34 KG/M2

## 2022-08-26 DIAGNOSIS — I89.0 LYMPHEDEMA OF BOTH LOWER EXTREMITIES: Primary | ICD-10-CM

## 2022-08-26 DIAGNOSIS — I87.2 VENOUS INSUFFICIENCY OF BOTH LOWER EXTREMITIES: ICD-10-CM

## 2022-08-26 DIAGNOSIS — E11.40 TYPE 2 DIABETES MELLITUS WITH DIABETIC NEUROPATHY, WITH LONG-TERM CURRENT USE OF INSULIN: ICD-10-CM

## 2022-08-26 DIAGNOSIS — Z79.4 TYPE 2 DIABETES MELLITUS WITH DIABETIC NEUROPATHY, WITH LONG-TERM CURRENT USE OF INSULIN: ICD-10-CM

## 2022-08-26 PROCEDURE — 99213 OFFICE O/P EST LOW 20 MIN: CPT | Mod: ,,, | Performed by: STUDENT IN AN ORGANIZED HEALTH CARE EDUCATION/TRAINING PROGRAM

## 2022-08-26 PROCEDURE — 99213 PR OFFICE/OUTPT VISIT, EST, LEVL III, 20-29 MIN: ICD-10-PCS | Mod: ,,, | Performed by: STUDENT IN AN ORGANIZED HEALTH CARE EDUCATION/TRAINING PROGRAM

## 2022-08-26 PROCEDURE — 99212 OFFICE O/P EST SF 10 MIN: CPT

## 2022-08-26 NOTE — PROCEDURES
Debridement    Date/Time: 8/19/2022 9:00 AM  Performed by: Savanah Felton DPM  Authorized by: Savanah Felton DPM     Consent Done?:  Yes (Verbal)  Local anesthesia used?: No      Wound Details:    Location:  Left leg    Type of Debridement:  Excisional       Length (cm):  0.3       Area (sq cm):  0.15       Width (cm):  0.5       Percent Debrided (%):  100       Depth (cm):  0.1       Total Area Debrided (sq cm):  0.15    Depth of debridement:  Subcutaneous tissue    Tissue debrided:  Subcutaneous and Other    Devitalized tissue debrided:  Biofilm    Instruments:  Curette    Bleeding:  Minimal  Patient tolerance:  Patient tolerated the procedure well with no immediate complications     No cultures were taken during this visit        ,DirectAddress_Unknown

## 2022-08-26 NOTE — PROGRESS NOTES
[facility]    Subjective:       Patient ID: Jian Arrieta is a 67 y.o. male.    Chief Complaint: Wound Care    8/26/2022  F/u with Dr Felton.  No complaints.  Pt not wearing tubigrip or dressing.  LLE wound has resolved.  Pt hit his hand on the bathroom door in the clinic, new skin tear to L hand, pt refused tx, cleansed with saline and applied bandaid.  Pt refused protective bandage to healed LLE and BLE tubigrip.  Pt discharged from clinic, to f/u with Dr Felton in Podiatry in 3 months.      Review of Systems   Constitutional:  Negative for activity change, chills, diaphoresis and fever.   HENT:  Negative for congestion and hearing loss.    Respiratory:  Negative for cough and shortness of breath.    Cardiovascular:  Positive for leg swelling.   Gastrointestinal:  Negative for nausea and vomiting.   Skin:  Positive for wound. Negative for color change, pallor and rash.   Neurological:  Positive for numbness. Negative for tremors, speech difficulty and weakness.   Psychiatric/Behavioral:  Negative for agitation and confusion. The patient is not nervous/anxious.        Objective:        Physical Exam  Constitutional:       General: He is not in acute distress.     Appearance: Normal appearance. He is well-developed. He is not diaphoretic.   Cardiovascular:      Comments: Dorsalis pedis and posterior tibial pulses are mildly diminished. Skin temperature is within normal limits. Toes are cool to touch and feet are warm proximally. Hair growth is diminished. Skin is atrophic and with mild hyperpigmentation. Pitting edema noted, bilaterally.   Musculoskeletal:         General: No tenderness.      Comments: Adequate joint range of motion without pain, limitation, nor crepitation to foot and ankle joints. Muscle strength is 5/5 in all groups bilaterally.       Feet:      Right foot:      Skin integrity: Dry skin present. No ulcer, blister, erythema or warmth.      Left  foot:      Skin integrity: Dry skin present. No ulcer, blister, erythema or warmth.   Skin:     General: Skin is warm and dry.      Capillary Refill: Capillary refill takes less than 2 seconds.      Comments: Skin is warm and dry, no acute signs of infection noted bilaterally      See wound description below    Toenails are thickened by 2-4 mm's, dystrophic, and are darkened in coloration with subungual fungal debris.     Otherwise, no open wounds, macerations or hyperkeratotic lesions, bilaterally            Neurological:      Mental Status: He is alert and oriented to person, place, and time.      Sensory: Sensory deficit present.      Motor: No abnormal muscle tone.      Comments: Light touch within normal limits.    Psychiatric:         Mood and Affect: Mood normal.         Behavior: Behavior normal.         Thought Content: Thought content normal.          Altered Skin Integrity 08/26/22 0900 Left posterior Hand Skin Tear (Active)   08/26/22 0900   Altered Skin Integrity Present on Admission: yes   Side: Left   Orientation: posterior   Location: Hand   Wound Number:    Is this injury device related?:    Primary Wound Type: Skin tear   Description of Altered Skin Integrity:    Removal Indication and Assessment:    Wound Outcome:    (Retired) Wound Length (cm):    (Retired) Wound Width (cm):    (Retired) Depth (cm):    Wound Description (Comments):    Removal Indications:    Wound Image   08/26/22 0900   Dressing Appearance Open to air 08/26/22 0900   Drainage Amount Scant 08/26/22 0900   Drainage Characteristics/Odor Sanguineous 08/26/22 0900   Appearance Red;Moist 08/26/22 0900   Tissue loss description Partial thickness 08/26/22 0900   Red (%), Wound Tissue Color 100 % 08/26/22 0900   Periwound Area Intact;Dry 08/26/22 0900   Wound Edges Approximated 08/26/22 0900   Wound Length (cm) 0.4 cm 08/26/22 0900   Wound Width (cm) 0.7 cm 08/26/22 0900   Wound Depth (cm) 0 cm 08/26/22 0900   Wound Volume (cm^3) 0 cm^3  08/26/22 0900   Wound Surface Area (cm^2) 0.28 cm^2 08/26/22 0900   Care Cleansed with:;Sterile normal saline 08/26/22 0900   Dressing Applied;Bandaid 08/26/22 0900   Dressing Change Due 08/27/22 08/26/22 0900            Wound 07/15/22 0900 Skin Tear Left lower Leg (Active)   07/15/22 0900    Pre-existing: Yes   Primary Wound Type: Skin tear   Side: Left   Orientation: lower   Location: Leg   Wound Number:    Ankle-Brachial Index:    Pulses:    Removal Indication and Assessment:    Wound Outcome:    (Retired) Wound Type:    (Retired) Wound Length (cm):    (Retired) Wound Width (cm):    (Retired) Depth (cm):    Wound Description (Comments):    Removal Indications:    Wound Image   08/26/22 0900   Dressing Appearance Dry;Intact;Clean 08/26/22 0900   Drainage Amount None 08/26/22 0900   Appearance Closed/resurfaced;Dry 08/26/22 0900   Tissue loss description Not applicable 08/26/22 0900   Periwound Area Edematous;Intact;Dry;Pink 08/26/22 0900   Wound Edges Rolled/closed 08/26/22 0900   Wound Length (cm) 0 cm 08/26/22 0900   Wound Width (cm) 0 cm 08/26/22 0900   Wound Depth (cm) 0 cm 08/26/22 0900   Wound Volume (cm^3) 0 cm^3 08/26/22 0900   Wound Surface Area (cm^2) 0 cm^2 08/26/22 0900   Care Cleansed with:;Soap and water;Sterile normal saline 08/26/22 0900            Wound 07/29/22 0919  Right anterior;lower Leg (Active)   07/29/22 0919    Pre-existing: Yes   Primary Wound Type:    Side: Right   Orientation: anterior;lower   Location: Leg   Wound Number:    Ankle-Brachial Index:    Pulses:    Removal Indication and Assessment:    Wound Outcome:    (Retired) Wound Type:    (Retired) Wound Length (cm):    (Retired) Wound Width (cm):    (Retired) Depth (cm):    Wound Description (Comments):    Removal Indications:    Wound Image   08/26/22 0900   Dressing Appearance Open to air 08/26/22 0900   Drainage Amount None 08/26/22 0900   Appearance Closed/resurfaced 08/26/22 0900   Tissue loss description Not applicable  08/26/22 0900   Periwound Area Edematous;Dry 08/26/22 0900   Wound Length (cm) 0 cm 08/26/22 0900   Wound Width (cm) 0 cm 08/26/22 0900   Wound Depth (cm) 0 cm 08/26/22 0900   Wound Volume (cm^3) 0 cm^3 08/26/22 0900   Wound Surface Area (cm^2) 0 cm^2 08/26/22 0900   Care Cleansed with:;Soap and water 08/26/22 0900         Assessment:         ICD-10-CM ICD-9-CM   1. Lymphedema of both lower extremities  I89.0 457.1   2. Venous insufficiency of both lower extremities  I87.2 459.81   3. Type 2 diabetes mellitus with diabetic neuropathy, with long-term current use of insulin  E11.40 250.60    Z79.4 357.2     V58.67       Nursing Assessment: (Pt hit his hand on the bathroom door in the clinic, new skin tear to L hand, pt refused tx, cleansed with saline and applied bandaid.  Pt refused protective bandage to healed LLE and BLE tubigrip.)      Plan:   Tissue pathology and/or culture taken:  [] Yes [x] No   Sharp debridement performed:   [] Yes [x] No   Labs ordered this visit:   [] Yes [x] No   Imaging ordered this visit:   [] Yes [x] No           Orders Placed This Encounter   Procedures    Change dressing     Healed Wound Instructions:Your wound is healed, it is extremely fragile at this stage; protect it from friction, wash it gently and pat dry.  Continue wearing Tubigrip F BLE to help with swelling and prevent ulcers.  If the wound should re-open, please call [phone] for further instructions.        -Lower extremity wounds are healed  -Rest, elevate, avoid salt intake. Continue follow up with PCP and Cardiology for leg swelling  -Shoe inspection. Diabetic Foot Education. Patient reminded of the importance of good nutrition and blood sugar control to help prevent podiatric complications of diabetes. Inspect feet daily for skin breaks, blisters, swelling, or redness. Wear cotton socks (preferably white)  Change socks every day. Do NOT walk barefoot. Do NOT use heating pads or warm/hot water soaks. I discussed with  the  patient  signs and symptoms of infection including redness, drainage, purulence, odor, pain, elevated BS, streaking, fever, chills, etc . Patient is to seek medical attention (ER or urgent care) if these symptoms occur    Follow up if symptoms worsen or fail to improve, for Dr Felton.

## 2022-08-31 DIAGNOSIS — E11.9 TYPE 2 DIABETES MELLITUS WITHOUT COMPLICATION, UNSPECIFIED WHETHER LONG TERM INSULIN USE: ICD-10-CM

## 2022-09-06 ENCOUNTER — PATIENT MESSAGE (OUTPATIENT)
Dept: ADMINISTRATIVE | Facility: HOSPITAL | Age: 67
End: 2022-09-06
Payer: MEDICARE

## 2022-09-09 ENCOUNTER — LAB VISIT (OUTPATIENT)
Dept: LAB | Facility: HOSPITAL | Age: 67
End: 2022-09-09
Payer: MEDICARE

## 2022-09-09 DIAGNOSIS — Z79.4 TYPE 2 DIABETES MELLITUS WITH STAGE 3 CHRONIC KIDNEY DISEASE, WITH LONG-TERM CURRENT USE OF INSULIN, UNSPECIFIED WHETHER STAGE 3A OR 3B CKD: ICD-10-CM

## 2022-09-09 DIAGNOSIS — E11.59 HYPERTENSION ASSOCIATED WITH DIABETES: ICD-10-CM

## 2022-09-09 DIAGNOSIS — E78.5 HYPERLIPIDEMIA ASSOCIATED WITH TYPE 2 DIABETES MELLITUS: ICD-10-CM

## 2022-09-09 DIAGNOSIS — N18.30 TYPE 2 DIABETES MELLITUS WITH STAGE 3 CHRONIC KIDNEY DISEASE, WITH LONG-TERM CURRENT USE OF INSULIN, UNSPECIFIED WHETHER STAGE 3A OR 3B CKD: ICD-10-CM

## 2022-09-09 DIAGNOSIS — E11.22 TYPE 2 DIABETES MELLITUS WITH STAGE 3 CHRONIC KIDNEY DISEASE, WITH LONG-TERM CURRENT USE OF INSULIN, UNSPECIFIED WHETHER STAGE 3A OR 3B CKD: ICD-10-CM

## 2022-09-09 DIAGNOSIS — I15.2 HYPERTENSION ASSOCIATED WITH DIABETES: ICD-10-CM

## 2022-09-09 DIAGNOSIS — E11.69 HYPERLIPIDEMIA ASSOCIATED WITH TYPE 2 DIABETES MELLITUS: ICD-10-CM

## 2022-09-09 DIAGNOSIS — N20.0 BILATERAL KIDNEY STONES: ICD-10-CM

## 2022-09-09 LAB
ALBUMIN SERPL BCP-MCNC: 3.1 G/DL (ref 3.5–5.2)
ALP SERPL-CCNC: 149 U/L (ref 55–135)
ALT SERPL W/O P-5'-P-CCNC: 33 U/L (ref 10–44)
ANION GAP SERPL CALC-SCNC: 5 MMOL/L (ref 8–16)
AST SERPL-CCNC: 31 U/L (ref 10–40)
BASOPHILS # BLD AUTO: 0.06 K/UL (ref 0–0.2)
BASOPHILS NFR BLD: 0.7 % (ref 0–1.9)
BILIRUB SERPL-MCNC: 0.4 MG/DL (ref 0.1–1)
BUN SERPL-MCNC: 35 MG/DL (ref 8–23)
CALCIUM SERPL-MCNC: 8.8 MG/DL (ref 8.7–10.5)
CHLORIDE SERPL-SCNC: 108 MMOL/L (ref 95–110)
CO2 SERPL-SCNC: 27 MMOL/L (ref 23–29)
CREAT SERPL-MCNC: 2 MG/DL (ref 0.5–1.4)
DIFFERENTIAL METHOD: ABNORMAL
EOSINOPHIL # BLD AUTO: 0.2 K/UL (ref 0–0.5)
EOSINOPHIL NFR BLD: 2.4 % (ref 0–8)
ERYTHROCYTE [DISTWIDTH] IN BLOOD BY AUTOMATED COUNT: 12.9 % (ref 11.5–14.5)
EST. GFR  (NO RACE VARIABLE): 35.7 ML/MIN/1.73 M^2
ESTIMATED AVG GLUCOSE: 192 MG/DL (ref 68–131)
GLUCOSE SERPL-MCNC: 77 MG/DL (ref 70–110)
HBA1C MFR BLD: 8.3 % (ref 4–5.6)
HCT VFR BLD AUTO: 41 % (ref 40–54)
HGB BLD-MCNC: 13.6 G/DL (ref 14–18)
IMM GRANULOCYTES # BLD AUTO: 0.03 K/UL (ref 0–0.04)
IMM GRANULOCYTES NFR BLD AUTO: 0.4 % (ref 0–0.5)
LYMPHOCYTES # BLD AUTO: 1.6 K/UL (ref 1–4.8)
LYMPHOCYTES NFR BLD: 20.3 % (ref 18–48)
MCH RBC QN AUTO: 31.1 PG (ref 27–31)
MCHC RBC AUTO-ENTMCNC: 33.2 G/DL (ref 32–36)
MCV RBC AUTO: 94 FL (ref 82–98)
MONOCYTES # BLD AUTO: 0.6 K/UL (ref 0.3–1)
MONOCYTES NFR BLD: 7.9 % (ref 4–15)
NEUTROPHILS # BLD AUTO: 5.5 K/UL (ref 1.8–7.7)
NEUTROPHILS NFR BLD: 68.3 % (ref 38–73)
NRBC BLD-RTO: 0 /100 WBC
PLATELET # BLD AUTO: 163 K/UL (ref 150–450)
PMV BLD AUTO: 10.8 FL (ref 9.2–12.9)
POTASSIUM SERPL-SCNC: 5 MMOL/L (ref 3.5–5.1)
PROT SERPL-MCNC: 6.3 G/DL (ref 6–8.4)
RBC # BLD AUTO: 4.37 M/UL (ref 4.6–6.2)
SODIUM SERPL-SCNC: 140 MMOL/L (ref 136–145)
URATE SERPL-MCNC: 5.7 MG/DL (ref 3.4–7)
WBC # BLD AUTO: 8.07 K/UL (ref 3.9–12.7)

## 2022-09-09 PROCEDURE — 80053 COMPREHEN METABOLIC PANEL: CPT | Performed by: INTERNAL MEDICINE

## 2022-09-09 PROCEDURE — 84550 ASSAY OF BLOOD/URIC ACID: CPT | Performed by: UROLOGY

## 2022-09-09 PROCEDURE — 83036 HEMOGLOBIN GLYCOSYLATED A1C: CPT | Performed by: INTERNAL MEDICINE

## 2022-09-09 PROCEDURE — 36415 COLL VENOUS BLD VENIPUNCTURE: CPT | Mod: PO | Performed by: INTERNAL MEDICINE

## 2022-09-09 PROCEDURE — 85025 COMPLETE CBC W/AUTO DIFF WBC: CPT | Performed by: INTERNAL MEDICINE

## 2022-10-13 ENCOUNTER — PATIENT OUTREACH (OUTPATIENT)
Dept: ADMINISTRATIVE | Facility: HOSPITAL | Age: 67
End: 2022-10-13

## 2022-10-13 ENCOUNTER — PATIENT MESSAGE (OUTPATIENT)
Dept: ADMINISTRATIVE | Facility: HOSPITAL | Age: 67
End: 2022-10-13

## 2022-10-21 ENCOUNTER — TELEPHONE (OUTPATIENT)
Dept: DIABETES | Facility: CLINIC | Age: 67
End: 2022-10-21

## 2022-10-21 DIAGNOSIS — Z79.4 TYPE 2 DIABETES MELLITUS WITH OTHER SPECIFIED COMPLICATION, WITH LONG-TERM CURRENT USE OF INSULIN: ICD-10-CM

## 2022-10-21 DIAGNOSIS — E11.69 TYPE 2 DIABETES MELLITUS WITH OTHER SPECIFIED COMPLICATION, WITH LONG-TERM CURRENT USE OF INSULIN: ICD-10-CM

## 2022-10-21 RX ORDER — BLOOD-GLUCOSE TRANSMITTER
1 EACH MISCELLANEOUS CONTINUOUS
Qty: 1 EACH | Refills: 3 | Status: SHIPPED | OUTPATIENT
Start: 2022-10-21 | End: 2023-03-07

## 2022-10-21 RX ORDER — BLOOD-GLUCOSE SENSOR
9 EACH MISCELLANEOUS CONTINUOUS
Qty: 9 EACH | Refills: 3 | Status: SHIPPED | OUTPATIENT
Start: 2022-10-21 | End: 2023-03-07

## 2022-10-21 RX ORDER — INSULIN LISPRO 100 [IU]/ML
INJECTION, SOLUTION INTRAVENOUS; SUBCUTANEOUS
Status: ON HOLD | COMMUNITY
Start: 2022-08-12 | End: 2023-10-17 | Stop reason: HOSPADM

## 2022-10-21 NOTE — TELEPHONE ENCOUNTER
----- Message from Jill Calvillo MA sent at 10/21/2022  2:50 PM CDT -----  Contact: [facility]/Shila [phone]    ----- Message -----  From: Rosetta Parr  Sent: 10/21/2022   2:47 PM CDT  To: [facility] Staff    Requesting an RX refill or new RX.  Is this a refill or new RX: Refill  RX name and strength: Dexcom machine and sensors  Is this a 30 day or 90 day RX: 90 day with refills  Patient advised refills can take 72 hours and [facility] can be used for refills?:  n/a  Pharmacy name and phone #: fax to [facility]   Comments:     Thank You

## 2022-12-21 DIAGNOSIS — E11.9 TYPE 2 DIABETES MELLITUS WITHOUT COMPLICATION: ICD-10-CM

## 2023-01-06 ENCOUNTER — PES CALL (OUTPATIENT)
Dept: ADMINISTRATIVE | Facility: CLINIC | Age: 68
End: 2023-01-06
Payer: MEDICARE

## 2023-03-07 ENCOUNTER — PATIENT OUTREACH (OUTPATIENT)
Dept: ADMINISTRATIVE | Facility: HOSPITAL | Age: 68
End: 2023-03-07
Payer: MEDICARE

## 2023-03-07 ENCOUNTER — TELEPHONE (OUTPATIENT)
Dept: INTERNAL MEDICINE | Facility: CLINIC | Age: 68
End: 2023-03-07
Payer: MEDICARE

## 2023-03-07 DIAGNOSIS — E78.5 HYPERLIPIDEMIA ASSOCIATED WITH TYPE 2 DIABETES MELLITUS: Primary | ICD-10-CM

## 2023-03-07 DIAGNOSIS — E11.69 HYPERLIPIDEMIA ASSOCIATED WITH TYPE 2 DIABETES MELLITUS: Primary | ICD-10-CM

## 2023-03-07 NOTE — TELEPHONE ENCOUNTER
----- Message from Martina Downey sent at 3/7/2023  4:00 PM CST -----  Contact: [phone]  Patient called, was at the [facility] with cellulitis. Patient said that he ran out of diabetics supplies, but he change insurance and right now he is without medication. Please call ASAP. Please call and advise. Thank you

## 2023-03-07 NOTE — PROGRESS NOTES
Health Maintenance Due   Topic Date Due    Shingles Vaccine (1 of 2) Never done    Pneumococcal Vaccines (Age 65+) (2 - PCV) 10/26/2021    COVID-19 Vaccine (4 - Booster for Moderna series) 01/17/2022    Eye Exam  06/10/2022    Influenza Vaccine (1) 09/01/2022    Foot Exam  09/21/2022     Chart reviewed.   Immunizations: Reconciled  Orders placed: N/A  Upcoming appts to satisfy AMANDA topics: Labs 3/8/2023

## 2023-03-07 NOTE — TELEPHONE ENCOUNTER
----- Message from Martina Downey sent at 3/7/2023  4:04 PM CST -----  Contact: [phone]  Patient called, was at the [facility] with cellulitis. Patient said that he ran out of diabetics supplies, but he change insurance and right now he is without medication. Patient want a call back ASAP Please call and advise. Thank you

## 2023-03-08 ENCOUNTER — LAB VISIT (OUTPATIENT)
Dept: LAB | Facility: HOSPITAL | Age: 68
End: 2023-03-08
Payer: MEDICARE

## 2023-03-08 DIAGNOSIS — E78.5 HYPERLIPIDEMIA ASSOCIATED WITH TYPE 2 DIABETES MELLITUS: ICD-10-CM

## 2023-03-08 DIAGNOSIS — E11.69 HYPERLIPIDEMIA ASSOCIATED WITH TYPE 2 DIABETES MELLITUS: ICD-10-CM

## 2023-03-08 LAB
ALBUMIN/CREAT UR: 3749.6 UG/MG (ref 0–30)
CREAT UR-MCNC: 117 MG/DL (ref 23–375)
MICROALBUMIN UR DL<=1MG/L-MCNC: 4387 UG/ML

## 2023-03-08 PROCEDURE — 82570 ASSAY OF URINE CREATININE: CPT | Performed by: NURSE PRACTITIONER

## 2023-03-09 ENCOUNTER — TELEPHONE (OUTPATIENT)
Dept: INTERNAL MEDICINE | Facility: CLINIC | Age: 68
End: 2023-03-09
Payer: MEDICARE

## 2023-03-09 DIAGNOSIS — I15.2 HYPERTENSION ASSOCIATED WITH DIABETES: ICD-10-CM

## 2023-03-09 DIAGNOSIS — Z12.5 PROSTATE CANCER SCREENING: ICD-10-CM

## 2023-03-09 DIAGNOSIS — E11.59 HYPERTENSION ASSOCIATED WITH DIABETES: ICD-10-CM

## 2023-03-09 DIAGNOSIS — E11.40 TYPE 2 DIABETES MELLITUS WITH DIABETIC NEUROPATHY, WITH LONG-TERM CURRENT USE OF INSULIN: ICD-10-CM

## 2023-03-09 DIAGNOSIS — Z00.00 ANNUAL PHYSICAL EXAM: ICD-10-CM

## 2023-03-09 DIAGNOSIS — E78.5 HYPERLIPIDEMIA ASSOCIATED WITH TYPE 2 DIABETES MELLITUS: Primary | ICD-10-CM

## 2023-03-09 DIAGNOSIS — Z79.4 TYPE 2 DIABETES MELLITUS WITH DIABETIC NEUROPATHY, WITH LONG-TERM CURRENT USE OF INSULIN: ICD-10-CM

## 2023-03-09 DIAGNOSIS — E11.69 HYPERLIPIDEMIA ASSOCIATED WITH TYPE 2 DIABETES MELLITUS: Primary | ICD-10-CM

## 2023-03-16 ENCOUNTER — OFFICE VISIT (OUTPATIENT)
Dept: INTERNAL MEDICINE | Facility: CLINIC | Age: 68
End: 2023-03-16
Payer: MEDICARE

## 2023-03-16 VITALS
HEART RATE: 93 BPM | BODY MASS INDEX: 40.73 KG/M2 | TEMPERATURE: 98 F | DIASTOLIC BLOOD PRESSURE: 60 MMHG | HEIGHT: 67 IN | WEIGHT: 259.5 LBS | RESPIRATION RATE: 16 BRPM | SYSTOLIC BLOOD PRESSURE: 100 MMHG | OXYGEN SATURATION: 99 %

## 2023-03-16 DIAGNOSIS — E11.59 HYPERTENSION ASSOCIATED WITH DIABETES: Chronic | ICD-10-CM

## 2023-03-16 DIAGNOSIS — N18.30 TYPE 2 DIABETES MELLITUS WITH STAGE 3 CHRONIC KIDNEY DISEASE, WITH LONG-TERM CURRENT USE OF INSULIN, UNSPECIFIED WHETHER STAGE 3A OR 3B CKD: ICD-10-CM

## 2023-03-16 DIAGNOSIS — I15.2 HYPERTENSION ASSOCIATED WITH DIABETES: Chronic | ICD-10-CM

## 2023-03-16 DIAGNOSIS — E11.22 TYPE 2 DIABETES MELLITUS WITH STAGE 3 CHRONIC KIDNEY DISEASE, WITH LONG-TERM CURRENT USE OF INSULIN, UNSPECIFIED WHETHER STAGE 3A OR 3B CKD: ICD-10-CM

## 2023-03-16 DIAGNOSIS — N52.9 ERECTILE DYSFUNCTION, UNSPECIFIED ERECTILE DYSFUNCTION TYPE: ICD-10-CM

## 2023-03-16 DIAGNOSIS — E11.69 HYPERLIPIDEMIA ASSOCIATED WITH TYPE 2 DIABETES MELLITUS: Chronic | ICD-10-CM

## 2023-03-16 DIAGNOSIS — E11.42 DIABETIC POLYNEUROPATHY ASSOCIATED WITH TYPE 2 DIABETES MELLITUS: ICD-10-CM

## 2023-03-16 DIAGNOSIS — E11.69 TYPE 2 DIABETES MELLITUS WITH OTHER SPECIFIED COMPLICATION, WITH LONG-TERM CURRENT USE OF INSULIN: ICD-10-CM

## 2023-03-16 DIAGNOSIS — Z79.4 TYPE 2 DIABETES MELLITUS WITH STAGE 3 CHRONIC KIDNEY DISEASE, WITH LONG-TERM CURRENT USE OF INSULIN, UNSPECIFIED WHETHER STAGE 3A OR 3B CKD: ICD-10-CM

## 2023-03-16 DIAGNOSIS — E66.01 MORBID OBESITY: ICD-10-CM

## 2023-03-16 DIAGNOSIS — Z79.4 TYPE 2 DIABETES MELLITUS WITH OTHER SPECIFIED COMPLICATION, WITH LONG-TERM CURRENT USE OF INSULIN: ICD-10-CM

## 2023-03-16 DIAGNOSIS — E13.9 LADA (LATENT AUTOIMMUNE DIABETES IN ADULTS), MANAGED AS TYPE 1: Primary | ICD-10-CM

## 2023-03-16 DIAGNOSIS — E78.5 HYPERLIPIDEMIA ASSOCIATED WITH TYPE 2 DIABETES MELLITUS: Chronic | ICD-10-CM

## 2023-03-16 PROCEDURE — 1126F AMNT PAIN NOTED NONE PRSNT: CPT | Mod: CPTII,S$GLB,, | Performed by: NURSE PRACTITIONER

## 2023-03-16 PROCEDURE — 3052F HG A1C>EQUAL 8.0%<EQUAL 9.0%: CPT | Mod: CPTII,S$GLB,, | Performed by: NURSE PRACTITIONER

## 2023-03-16 PROCEDURE — 1126F PR PAIN SEVERITY QUANTIFIED, NO PAIN PRESENT: ICD-10-PCS | Mod: CPTII,S$GLB,, | Performed by: NURSE PRACTITIONER

## 2023-03-16 PROCEDURE — 3074F PR MOST RECENT SYSTOLIC BLOOD PRESSURE < 130 MM HG: ICD-10-PCS | Mod: CPTII,S$GLB,, | Performed by: NURSE PRACTITIONER

## 2023-03-16 PROCEDURE — 3008F PR BODY MASS INDEX (BMI) DOCUMENTED: ICD-10-PCS | Mod: CPTII,S$GLB,, | Performed by: NURSE PRACTITIONER

## 2023-03-16 PROCEDURE — 1160F RVW MEDS BY RX/DR IN RCRD: CPT | Mod: CPTII,S$GLB,, | Performed by: NURSE PRACTITIONER

## 2023-03-16 PROCEDURE — 3052F PR MOST RECENT HEMOGLOBIN A1C LEVEL 8.0 - < 9.0%: ICD-10-PCS | Mod: CPTII,S$GLB,, | Performed by: NURSE PRACTITIONER

## 2023-03-16 PROCEDURE — 99215 OFFICE O/P EST HI 40 MIN: CPT | Mod: S$GLB,,, | Performed by: NURSE PRACTITIONER

## 2023-03-16 PROCEDURE — 3062F POS MACROALBUMINURIA REV: CPT | Mod: CPTII,S$GLB,, | Performed by: NURSE PRACTITIONER

## 2023-03-16 PROCEDURE — 3066F NEPHROPATHY DOC TX: CPT | Mod: CPTII,S$GLB,, | Performed by: NURSE PRACTITIONER

## 2023-03-16 PROCEDURE — 1101F PT FALLS ASSESS-DOCD LE1/YR: CPT | Mod: CPTII,S$GLB,, | Performed by: NURSE PRACTITIONER

## 2023-03-16 PROCEDURE — 3066F PR DOCUMENTATION OF TREATMENT FOR NEPHROPATHY: ICD-10-PCS | Mod: CPTII,S$GLB,, | Performed by: NURSE PRACTITIONER

## 2023-03-16 PROCEDURE — 3074F SYST BP LT 130 MM HG: CPT | Mod: CPTII,S$GLB,, | Performed by: NURSE PRACTITIONER

## 2023-03-16 PROCEDURE — 95251 PR GLUCOSE MONITOR, 72 HOUR, PHYS INTERP: ICD-10-PCS | Mod: S$GLB,,, | Performed by: NURSE PRACTITIONER

## 2023-03-16 PROCEDURE — 95251 CONT GLUC MNTR ANALYSIS I&R: CPT | Mod: S$GLB,,, | Performed by: NURSE PRACTITIONER

## 2023-03-16 PROCEDURE — 1159F PR MEDICATION LIST DOCUMENTED IN MEDICAL RECORD: ICD-10-PCS | Mod: CPTII,S$GLB,, | Performed by: NURSE PRACTITIONER

## 2023-03-16 PROCEDURE — 99215 PR OFFICE/OUTPT VISIT, EST, LEVL V, 40-54 MIN: ICD-10-PCS | Mod: S$GLB,,, | Performed by: NURSE PRACTITIONER

## 2023-03-16 PROCEDURE — 3078F DIAST BP <80 MM HG: CPT | Mod: CPTII,S$GLB,, | Performed by: NURSE PRACTITIONER

## 2023-03-16 PROCEDURE — 3062F PR POS MACROALBUMINURIA RESULT DOCUMENTED/REVIEW: ICD-10-PCS | Mod: CPTII,S$GLB,, | Performed by: NURSE PRACTITIONER

## 2023-03-16 PROCEDURE — 3288F PR FALLS RISK ASSESSMENT DOCUMENTED: ICD-10-PCS | Mod: CPTII,S$GLB,, | Performed by: NURSE PRACTITIONER

## 2023-03-16 PROCEDURE — 3288F FALL RISK ASSESSMENT DOCD: CPT | Mod: CPTII,S$GLB,, | Performed by: NURSE PRACTITIONER

## 2023-03-16 PROCEDURE — 3078F PR MOST RECENT DIASTOLIC BLOOD PRESSURE < 80 MM HG: ICD-10-PCS | Mod: CPTII,S$GLB,, | Performed by: NURSE PRACTITIONER

## 2023-03-16 PROCEDURE — 1101F PR PT FALLS ASSESS DOC 0-1 FALLS W/OUT INJ PAST YR: ICD-10-PCS | Mod: CPTII,S$GLB,, | Performed by: NURSE PRACTITIONER

## 2023-03-16 PROCEDURE — 1159F MED LIST DOCD IN RCRD: CPT | Mod: CPTII,S$GLB,, | Performed by: NURSE PRACTITIONER

## 2023-03-16 PROCEDURE — 99999 PR PBB SHADOW E&M-EST. PATIENT-LVL V: CPT | Mod: PBBFAC,,, | Performed by: NURSE PRACTITIONER

## 2023-03-16 PROCEDURE — 99999 PR PBB SHADOW E&M-EST. PATIENT-LVL V: ICD-10-PCS | Mod: PBBFAC,,, | Performed by: NURSE PRACTITIONER

## 2023-03-16 PROCEDURE — 1160F PR REVIEW ALL MEDS BY PRESCRIBER/CLIN PHARMACIST DOCUMENTED: ICD-10-PCS | Mod: CPTII,S$GLB,, | Performed by: NURSE PRACTITIONER

## 2023-03-16 PROCEDURE — 3008F BODY MASS INDEX DOCD: CPT | Mod: CPTII,S$GLB,, | Performed by: NURSE PRACTITIONER

## 2023-03-16 RX ORDER — INSULIN PMP CART,AUT,G6/7,CNTR
1 EACH SUBCUTANEOUS EVERY OTHER DAY
Qty: 15 EACH | Refills: 11 | Status: SHIPPED | OUTPATIENT
Start: 2023-03-16 | End: 2024-02-19

## 2023-03-16 RX ORDER — BLOOD-GLUCOSE TRANSMITTER
1 EACH MISCELLANEOUS
Qty: 1 EACH | Refills: 3 | Status: SHIPPED | OUTPATIENT
Start: 2023-03-16 | End: 2024-02-07 | Stop reason: SDUPTHER

## 2023-03-16 RX ORDER — INSULIN PMP CART,AUT,G6/7,CNTR
1 EACH SUBCUTANEOUS EVERY OTHER DAY
Qty: 15 EACH | Refills: 6 | Status: SHIPPED | OUTPATIENT
Start: 2023-03-16 | End: 2023-06-07

## 2023-03-16 RX ORDER — BLOOD-GLUCOSE SENSOR
1 EACH MISCELLANEOUS
Qty: 3 EACH | Refills: 11 | Status: SHIPPED | OUTPATIENT
Start: 2023-03-16 | End: 2024-03-25

## 2023-03-16 RX ORDER — INSULIN GLARGINE 300 U/ML
30 INJECTION, SOLUTION SUBCUTANEOUS DAILY
Qty: 5 PEN | Refills: 1 | Status: SHIPPED | OUTPATIENT
Start: 2023-03-16 | End: 2023-05-10

## 2023-03-16 NOTE — PATIENT INSTRUCTIONS
Switch from omnipod dash to omnipod 5 -   Continue Dexcom g6 -     For low blood sugar - remember to keep glucose tablets on hand. You can purchase these at the pharmacy check out desk/over the counter.   If blood sugar is less than 70, take/eat 2 - 3 tablets quickly to bring your sugar up.   Always keep 15 grams of Quick acting carbohydrates on hand to eat/drink if your sugar is low - examples are 1/2 cup of juice, coke, or crackers, granola bars.   Remember to eat meals frequently to prevent low blood blood sugars.      Please remember to change insulin pump set/pump site, refill reservoir every 3 days according to  instructions. Longer duration between pump site changes can result in high blood glucose.     Please bolus insulin 15 minutes before you eat meals to avoid spikes and lows in blood glucose.     Please bolus with every snack outside of daily meals.     If you are having sensor issues, please check glucose by finger sticks and input the glucose levels into the pump.     Contact the supplier if you are having sensors or pump issues for troubleshooting and additional supplies.              Pump Back Up Plan:   1.  In case you are not certain the pump or tubing is working correctly, use this plan.  2.  Stop the pump and disconnect the tubing and insertion set.  3.  If you will be off the pump for more than 1-2 hours without a basal rate, you must correct with Novolog or switch to a long acting insulin plan.    4.  If the insulin pump is non functional and discontinued for anticipated more than 20 hours, please give daily injections of:  Long acting insulin toujeo 30 units once daily  Short acting insulin for meals according to carb ratios 1:10 -   Or preset boluses - 6 , 8 or 10 units for small/medium/large meals - you can use the lyumjev and pull up/give with syringes.   5.  When the insulin pump is restarted, do not restart basal rates until at least 22 hours after the last long acting insulin  "injection. You can set a 0% temporary basal setting that will last until this time and use your pump to bolus for meals and correction.  6.  If glucose continues to rise, or no fresh backup insulin is available, or unable to perform the above instructions. Please be evaluated in the ED.        For any technical insulin pump issues, Contact the insulin pump company representative to troubleshoot the problems. (Help numbers are on the back of the pump device)               Hypoglycemia Treatment - The "15-15 Rule".     If you experience an episode of hypoglycemia (glucose less than 70), follow the 15-15 rule.     Have 15 grams of carbohydrate to raise your blood sugar and recheck it after 15 minutes. If its still below 70 mg/dL, have another serving.     Repeat these steps until your blood sugar is at least 70 mg/dL. Once your blood sugar is back to normal, eat a meal or snack to make sure it doesnt lower again.     This may be:     4 ounces (1/2 cup) of juice or regular soda (not diet)  1 tablespoon of sugar, honey, or corn syrup  Hard candies, jellybeans, or gumdrops--see food label for how many to consume  Make a note about any episodes of low blood sugar and talk with your health care team about why it happened. They can suggest ways to avoid low blood sugar in the future.      Many people tend to want to eat as much as they can until they feel better. This can cause blood sugar levels to shoot way up. Using the step-wise approach of the "15-15 Rule" can help you avoid this, preventing high blood sugar levels.     Note:  When treating a low, the choice of carbohydrate source is important. Complex carbohydrates, or foods that contain fats along with carbs (like chocolate) can slow the absorption of glucose and should not be used to treat an emergency low.     For more information, visit:  [email or web address]-glucose-testing-[email or web address]-[email or web address]            Please " notify your physician or me if  you have persistent low blood glucose <70 or persistent elevated glucose >250 as soon as possible in addition to the steps suggested above.

## 2023-03-16 NOTE — PROGRESS NOTES
67 y.o. male here for routine follow up for Type 1 -   Last seen august 2022 - those notes are below.     A1c still elevated at 8.9%   Currently on omnipod dash - began on pump from [employer] as of 8/17/2022 -   He changes about every 3 days.   Does not follow diabetic diet.     Pump downloaded and reviewed -   Total dose is 42.1 untis/day.   60% is coming from basal rate.   40% is coming from bolusing   2.5 boluses per day on average.   Active insulin time is 4 hours.   Basal rate is 1.1 units/hour.   ISF is at 50   Carb ratio 1: 15 - however he doesn't carb count -   Has bolus presets -   2 unit snack.   4 units small meal   6 unit medium meal.   8 units large meal.     Dexcom g6 - getting supplies from [place] [facility]   Average glucose 251 -   27% TIR   0% lows.   26% highs   47% very high.     He said that he's been told that he can get his dexcom from the pharmacy. So he wants me to send prescription there for him for his dexcom supplies.         Last visit notes from August 2022:   66 y.o. male here for 6 week follow up for management of T2dm - ELOISE - with low cpeptide levels in past.   He has been insulin dependent -   Last seen 6/29/222 - those notes below.     His a1c had gone up from 8.1% to 11.5%.   No new labs have been done for today's visit.   History of pancreatitis in 2018 - so he has not used  glp1s or dpp4's.   We have not used sglt2i's on him b/c of ELOISE status - but I am not opposed to using.   See below: cpeptide present, just low.   C-Peptide 0.78 - 5.19 ng/mL 0.67 Low     His Pelon is negative.     Continues on a vgo 30 and we had planned for him to get an omnipod to get better control of insulin needs/hyperglycemic episodes.   Reports giving still just 2 clicks with meals. He's scared to give too many clicks for fear of lows.   He has still not met with the diabetes educator to start on omnipod dash.   has his supplies - dash pods and pdm, and his lyumjev vial - he just not had made an  "appointment.   He continues to have hyperglycemia.     In the interim - he's suffered with kidney stones and is following with urology -   Also is following closely with woundcare, lymphedema in bilat. Lower extremities.       continues on Dexcom g6 - personal   downloaded personal and reviewed today -   Average glucose is 251 -   27% time in range.   <1% lows.   25% highs.   47% very highs.       Last visit notes as follows from 6/29/2022:   66 y.o. male here for 3 month follow up visit for management of T1dm -   ELOISE - formerly treated as type 2 -   History of pancreatitis.   Last seen in March 2022 - those notes are below.     a1c has been elevated in past, now extremely high   - up from 8.1% now to 11.5%   He's been very stressed since last visit -   Mother in law passed away - he's been very busy cleaning out her house.   Working a lot/sweating - states his vgo patch is just falling off.   He is nervous to be low/go low, so sometimes doesn't click with his meals.   Also got an infection in his left foot and in his calf in the interim - it has been treated.   But he isn't sure if his sugars were high prior and that's what caused the infection, or if the infection is what has caused his sugars to go high.     He is on vgo 30 still - using humalog insulin with it.   Usually gives 2 or 4 clicks with meals.   Often only "clicks" 1 or 2 times per day.   He denies any known low sugars, except for if he boluses "too much".   He is not following Ada diet.     Using dexcom g6 cgm - downloaded and reviewed today -   Using with his cellphone - see scanned in .     Average glucose 275 -   15% time in range.   0% lows.   23% highs.   61% very highs        Last visit notes as follows from march 2022:   66 y.o. gentleman, here for 3 month follow up visit oer routine for Eloise - T1dm -   Last seen in December 2021 - those notes are below.     a1c decreased from 8.6% to 8.1%.   He increased from the vgo 20 to vgo 30 " - still using humalog insulin   That has helped his overnight sugars, but he is still high during the day.   He often gives 4 clicks instead of 3 clicks with meals.   States sometimes he runs out of insulin in his patch if he gives too much.   We had discussed omnipod at his last visit, but he wanted to try the higher dose vgo patch.   He is not following Ada diet -     On dexcom G6 personal to check his sugars  Downloaded and reviewed today -   See scanned in  -   Average glucose 239 -   30% time in range.   <1% lows.   27% highs.   42% very highs.       Last visit notes from December 2021 -   66 y.o. T1dm - ELOISE - 3 month follow up.   Last seen in Septemer 2021 - those notes are velow.     His a1c remains elevated still @ 8.6%   Continues on VGO 20 -   States using same insulin - humalog -  4 clicks with meals.   No longer on metformin.   He has history of pancreatitis (2018) -  So cannot take glp1 or dpp4's -     He is not following ADA diet.   Eats out often, fried foods, high carb foods.   States he occasionally has lows when he gives boluses after he eats instead of before the meal.   He complains of right shoulder pain today -   Follows with Dr. Diaz - and is seeing him for kidney stone follow up.   Also c/o ED - has used viagra in past. Requests appt. With Dr. Diaz    Continues on Dexcom G6 - see scanned in .   Average glucose 258 -   24% time in range.   1% lows.   <1% very low. (states following a bolus/correction bolus).   26% highs.   48% extreme highs.       Last visit notes as follows from September 2021:   HPI: Jian Arrieta is a 67 y.o.  male c/I for visit to address Diabetes Type 1 (ELOISE adult onset)   This is the first time I am seeing him for care, follows with Dr. Leon Barajas,  for primary care needs.   Has seen endocrinology - Luisa Garcia NP in past - last visit was 8/9/2021 - those notes are below.  Had seen Dr. Carney and dr. Stauffer with endocrinology in  past prior visits.     he is establishing care with me today however.   Met last with GINO Machado 8/23/2021 -     was diagnosed with T2DM originally in 2016 -   He has history of pancreatitis in 2018 - portal vein thrombosis and underwent angioplasty.   now treated as T1 - ELOISE -   Had gallbladder removed as well.     Is on insulin only now via vgo patch - on VGO 20    Has never been hospitalized r/t DM.  Denies missing doses of DM medication - however admits he misses he meal time dose of insulin.     Hgba1c has increased from 7.7%  to 8.6%- now improved to 8.3% currently.   Not always following ADA diet. Eats out 2 - 3 times per day.   Local restaurants/pastas/fried food/waffle house.   vgo 20 - 2 - 3 clicks with breakfast.   4 clicks with lunch and dinner.   1 click with a snack.     He reports forgetting to click sometimes - and waits to click after meals.   Also fearful of hypoglycemia - so will under click.   Working in yard/increased activity or sweating - has low blood sugars.   He has baqsimi to correct     On dexcom g6 for checking sugars - downloaded and reviewed today -   Average glucose 243  32% time in range.   0% lows.   24% highs.   44% extreme highs.     Complications from diabetes and pertinent co-morbidities include:   Negative for DKA.   Has been on insulin for several years.   + for diabetic neuropathy.   + for nephropathy - CKD stage 3   + microalbuminuria.   Eyes:  negative for Diabetic retinopathy   CV: Denies history of MI nor stroke.   CAD: yes and history of CABG   Also heart failure history   Takes aspirin 81mg tablet daily  BP: has history of HTN  Statin: Taking  ACE/ARB: Not taking    Last notes from RADHA Garcia from August 2021 as follows:   Pt returns for follow up of type 2 diabetes complicated by Charcot foot, foot ulcer-now resolved, kidney stone, BMI 39, CKD stage III, s/p sleeve gastrectomy that is uncontrolled and complicated.  Previously seen by Dr. Carney and Dr. Stauffer and  myself. Last visit in April 2021.   He has dentures!!    He had hydrocele repair on 6/16/2021 -- also had kidney stones at the time. Had stent placed and removed. States he has kidney stones on the left and will be seeing Dr. Diaz.      With regards to the diabetes:   Diagnosed with Type 2 DM in 2016  Episode of pancreatitis around 2018,   Family History of Type 2 DM: none  Known complications:   DKA/HHS: none  RN: none; 6/10/21 -  PN: +; was seeing podiatry in the past  Nephropathy: + sees nephrology-- needs appt  Gastroparesis: none  CAD: CABG around 2016  Diabetes complications: CKD stage III, s/p sleeve gastrectomy   Current regimen:  Vgo 20 4 clicks with oatmeal and 3 clicks with breakfast; 4 clicks with lunch and dinner; 1-2 clicks with a snack; sometimes more for a snack   He is not running out of clicks at the end of the day.    He is sometimes taking Vgo off overnight to prevent hypoglycemia. States he is sometimes having hypoglycemia between 1-4AM. States he started doing this a couple of months ago.Takes off around 9-10PM and wakes up at 4-5AM and puts new Vgo on at 6AM. He is eating around 5-6 AM and puts on Vgo after he eats. In the past he was wearing Vgo for over 24 hours, still doing this.    He is fearful of hypoglycemia and overcorrecting.   Missed doses-missing clicks as above   Other medications tried:  Metformin   He is checking BG 4 times daily  Wearing Dexcom today. See media tab for report  His blood sugars are elevated after breakfast and throughout the day. He is putting on a new device sometimes after he eats breakfast which contributes to his hyperglycemia. He is having hyperglycemia at times overnight and he will take off Vgo.   He forgetting to click at times or clicking after he eats.  On days that he forgets to click, his blood sugars stay persistently high. His blood sugars come down nicely when he clicks.    He reports Dexcom and Vgo is falling off due to sweating badly.   Fasting:     Patient feels unwell with blood sugars less than 110. He loses ability to function with low blood sugars. His lowest blood sugar has been in the 40s (around 2020). Seen at [facility].    Dietary recall: He is not following diabetic diet. Appetite is good. Does not eat as much as before.   Eats 3 meals a day.   B: 5AM-waffle house or oatmeal or grits  L: 11:30  D: 7PM  Snacks: Grazing during the day  Drinks: tea and water    Exercise - tried to stay active.  Active in the yard, garage.    Education - last visit: 2021 -EZEQUIEL Palacios/[place]: has    Social History     Tobacco Use   Smoking Status Former    Packs/day: 2.00    Years: 30.00    Pack years: 60.00    Types: Cigarettes    Quit date: 2005    Years since quittin.1   Smokeless Tobacco Never     Past medical History:   Past Medical History:   Diagnosis Date    Alcohol abuse     Anasarca 2019    Anemia     Anticoagulant long-term use     Arthropathy associated with neurological disorder 2015    Atherosclerosis     Charcot foot due to diabetes mellitus     Chronic combined systolic and diastolic heart failure 2019 Left VentricleModerate decreased ejection fraction at 30%. Normal left ventricular cavity size. Normal wall thickness observed. Grade I (mild) left ventricular diastolic dysfunction consistent with impaired relaxation. Normal left atrial pressure. Moderate, global hypokinesis(see wall scoring diagram). Right VentricleNormal cavity size, wall thickness and ejection fraction. Wall motion n    Chronic pancreatitis 2019    CKD (chronic kidney disease) stage 3, GFR 30-59 ml/min     CKD (chronic kidney disease) stage 4, GFR 15-29 ml/min     Colon polyps     approx 5 yrs ago    Coronary artery disease due to calcified coronary lesion 2015    5 stents on ASA      Diabetic polyneuropathy associated with type 2 diabetes mellitus 2015    Diverticulosis 2019    DM type 2 with diabetic  peripheral neuropathy 2/4/2019    Encounter for blood transfusion     Essential hypertension 1/28/2019    Former smoker 8/26/2015    Healed ulcer of left foot on examination 6/20/2017    Hematuria     Hydrocele     approx 1.5 yrs ago    Hyperphosphatemia     Hypoalbuminemia 2/4/2019    Hypocalcemia     Lymphedema of both lower extremities 1/29/2019    Mixed hyperlipidemia 5/8/2015    Morbid obesity with BMI of 50.0-59.9, adult 5/8/2015    Obstruction of right ureteropelvic junction (UPJ) due to stone 5/24/2021    Onychomycosis of multiple toenails with type 2 diabetes mellitus and peripheral neuropathy 6/20/2017    Perianal cyst     approx 2 yrs ago    Proteinuria     Pseudocyst of pancreas 1/28/2019 1-28-19 Liver has a cirrhotic morphology with no focal lesions.  Significant interval increase in ascites when compared to prior exam which may account for patient's abdominal distension.  Hypodense air-fluid collection along the body of the pancreas which is slightly smaller when compared to prior CT.  Findings may relate to pancreatic necrosis with pancreatic pseudocysts with infected pseudocyst    Skin cancer     skin cancer    Sleep apnea 8/26/2015    Status post bariatric surgery 1/11/2016    Type 2 diabetes mellitus, with long-term current use of insulin 5/8/2015    Urinary tract infection       Family hx:   Family History   Problem Relation Age of Onset    Cancer Mother     Cancer Father     Heart disease Father     Obesity Sister     Parkinsonism Brother     No Known Problems Paternal Grandmother     Cancer Paternal Grandfather     Cancer Brother     Diabetes Maternal Grandmother     Stroke Maternal Grandfather     Cirrhosis Neg Hx       Current meds:   Current Outpatient Medications:     apixaban (ELIQUIS) 5 mg Tab, Take 1 tablet (5 mg total) by mouth 2 (two) times daily., Disp: 60 tablet, Rfl: 11    bumetanide (BUMEX) 1 MG tablet, TAKE 1 TABLET (1 MG TOTAL) BY MOUTH BEFORE BREAKFAST AND 1/2 TABLET (0.5 MG  TOTAL) EVERY EVENING., Disp: 135 tablet, Rfl: 3    doxycycline (VIBRA-TABS) 100 MG tablet, Take 1 tablet (100 mg total) by mouth 2 (two) times daily., Disp: 20 tablet, Rfl: 0    HUMALOG U-100 INSULIN 100 unit/mL injection, , Disp: , Rfl:     insulin pump cart,cont inf,BT (OMNIPOD DASH PODS, GEN 4,) Crtg, Inject 1 each into the skin every other day. Change pod every 2 days., Disp: 15 each, Rfl: 11    LYUMJEV U-100 INSULIN 100 unit/mL, INJECT 80 UNITS INTO THE SKIN CONTINUOUS. USES IN OMNIPOD INSULIN PUMP. USE AS DIRECTED. DO NOT DIRECTLY INJECT., Disp: 40 mL, Rfl: 3    rosuvastatin (CRESTOR) 5 MG tablet, TAKE 1 TABLET BY MOUTH EVERY DAY, Disp: 90 tablet, Rfl: 3    tamsulosin (FLOMAX) 0.4 mg Cap, Take 1 capsule (0.4 mg total) by mouth once daily., Disp: 30 capsule, Rfl: 1    aspirin (ECOTRIN) 81 MG EC tablet, Take 1 tablet (81 mg total) by mouth once daily., Disp: 9 tablet, Rfl: 0    blood-glucose sensor (DEXCOM G6 SENSOR) Sandi, Inject 1 each into the skin every 10 days., Disp: 3 each, Rfl: 11    blood-glucose transmitter (DEXCOM G6 TRANSMITTER) Sandi, Inject 1 each into the skin every 10 days., Disp: 1 each, Rfl: 3    glucagon, human recombinant, (GLUCAGON EMERGENCY KIT, HUMAN,) 1 mg SolR, Inject 1 mg into the muscle as needed (For severely low sugar). (Patient not taking: Reported on 6/29/2022), Disp: 1 each, Rfl: 2    insulin glargine, TOUJEO, (TOUJEO SOLOSTAR U-300 INSULIN) 300 unit/mL (1.5 mL) InPn pen, Inject 30 Units into the skin once daily. THIS IF FOR EMERGENCY BACK UP INSULIN USE ONLY - IF YOU ARE OFF OF YOUR omnipod/insulin pump - use this only., Disp: 5 pen, Rfl: 1    insulin pump cart,auto,BT-cntr (OMNIPOD 5 G6 INTRO KIT, GEN 5,) Crtg, Inject 1 each into the skin every other day., Disp: 15 each, Rfl: 6    insulin pump cart,automated,BT (OMNIPOD 5 G6 PODS, GEN 5,) Crtg, Inject 1 each into the skin every other day. Use with omnipod 5 pdm as directed., Disp: 15 each, Rfl: 11     Current Diabetes medications:  "  Omnipod dash - rate is at 1.1 units/hour.   lyumjev insulin -     Medications Tried and Failed:   Metformin  Hx pancreatitis - cannot use dpp4 or glp1's.   VGO 30 patch - (was on a vgo 20) - giving around 4 clicks with meals.   Humalog - then switched to lyumjev.     Social:   Lives at home with: wife  Life changes/stressors currently. Did mention his mother in law passed away recently of stroke. Helping clean out her house. Also suffered recent foot and left leg infection.   Diet: not following ADA diet   Meals: 3 per day and snacks.        Breakfast - waffle house special - eggs, biscuit, cheese, grits, hashbrowns.        Lunch - eats out/pastas/rice - red bean plates, etc.        Dinner - eats out every night - [employer]        Snacks 1 - 2 times per day.   Exercise: nothing formally - but works in yard/garage.    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     Glucose Monitoring:   Checking sugars 4x/day via Dexcom g6 - see scanned in .   Gets supplies via mail - has [employer]. Now has changed to [employer].     Standards of care:   Eyes: .: 06/10/2021  Foot exam: : 09/21/2021   Diabetes education: yes - and for vgo start and omnipod dash start.     Vital Signs  /60 (BP Location: Right arm, Patient Position: Sitting, BP Method: Large (Manual))   Pulse 93   Temp 97.7 °F (36.5 °C) (Temporal)   Resp 16   Ht 5' 7" (1.702 m)   Wt 117.7 kg (259 lb 7.7 oz)   SpO2 99%   BMI 40.64 kg/m²     Pertinent Labs:   Hgba1c   Lab Results   Component Value Date    HGBA1C 8.9 (H) 03/08/2023    HGBA1C 8.6 (H) 02/17/2023    HGBA1C 8.3 (H) 09/09/2022     Lipid panel   Lab Results   Component Value Date    CHOL 155 03/15/2022    CHOL 137 12/16/2021    CHOL 138 03/26/2021     Lab Results   Component Value Date    HDL 65 03/15/2022    HDL 55 12/16/2021    HDL 56 03/26/2021     Lab Results   Component Value Date    LDLCALC 77.2 03/15/2022    LDLCALC 70.8 12/16/2021    LDLCALC 64.8 " 03/26/2021     Lab Results   Component Value Date    TRIG 64 03/15/2022    TRIG 56 12/16/2021    TRIG 86 03/26/2021     Lab Results   Component Value Date    CHOLHDL 41.9 03/15/2022    CHOLHDL 40.1 12/16/2021    CHOLHDL 40.6 03/26/2021      CMP  Glucose   Date Value Ref Range Status   03/08/2023 93 70 - 110 mg/dL Final     BUN   Date Value Ref Range Status   03/08/2023 25 (H) 8 - 23 mg/dL Final     Creatinine   Date Value Ref Range Status   03/08/2023 2.1 (H) 0.5 - 1.4 mg/dL Final     eGFR if    Date Value Ref Range Status   06/22/2022 44.0 (A) >60 mL/min/1.73 m^2 Final     eGFR if non    Date Value Ref Range Status   06/22/2022 38.1 (A) >60 mL/min/1.73 m^2 Final     Comment:     Calculation used to obtain the estimated glomerular filtration  rate (eGFR) is the CKD-EPI equation.        AST   Date Value Ref Range Status   03/08/2023 38 10 - 40 U/L Final     ALT   Date Value Ref Range Status   03/08/2023 33 10 - 44 U/L Final     Microalbumin creatinine ratio:   Lab Results   Component Value Date    MICALBCREAT 3749.6 (H) 03/08/2023       Review Of Systems:   Gen: Appetite good, no weight gain or loss, + fatigue, Denies polydipsia.  Skin: Skin is intact and heals well, denies any rashes or hair changes.   EENT: Denies any acute visual disturbances, nor blurred vision.   Resp: Denies SOB or Dyspnea on exertion, denies cough.   Cardiac: Denies chest pain, palpitations, + 2 edema to bilat. Lower ext. Recent cellulitis infection.   GI: Denies abdominal pain, nausea or vomiting, diarrhea, or constipation.   /GYN: Denies nocturia, nor burning, frequency or pain on urination. + c/o ED still.   MS/Neuro: Denies numbness/ tingling in BLE; Gait unsteady, speech clear  Psych: Denies drug/ETOH abuse, no hx of depression.  Other systems: negative.    Physical Exam:   GENERAL: Well developed, well nourished in appearance.   PSYCH: AAOx3, appropriate mood and affect, pleasant expression, conversant,  appears relaxed, well groomed.   EYES: PERRL, Conjunctiva and corneas clear  NECK: Soft and Supple, trachea midline  CHEST: Even, regular, and unlabored respirations  ABDOMEN: Soft, non-tender, non-distended.   VASCULAR: pedal pulses palpable bilaterally,+ edema dependent bilaterally.   NEURO:  cranial nerves II - XII intact   MUSCULOSKELETAL: Good ROM, steady gait.   SKIN: Skin warm, dry, and intact -     Assessment and Plan of Care:     Jian was seen today for follow-up.    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  -     blood-glucose transmitter (DEXCOM G6 TRANSMITTER) Sandi; Inject 1 each into the skin every 10 days.  -     insulin pump cart,auto,BT-cntr (OMNIPOD 5 G6 INTRO KIT, GEN 5,) Crtg; Inject 1 each into the skin every other day.  -     insulin pump cart,automated,BT (OMNIPOD 5 G6 PODS, GEN 5,) Crtg; Inject 1 each into the skin every other day. Use with omnipod 5 pdm as directed.  -     Ambulatory referral/consult to Diabetes Education; Future  -     insulin glargine, TOUJEO, (TOUJEO SOLOSTAR U-300 INSULIN) 300 unit/mL (1.5 mL) InPn pen; Inject 30 Units into the skin once daily. THIS IF FOR EMERGENCY BACK UP INSULIN USE ONLY - IF YOU ARE OFF OF YOUR omnipod/insulin pump - use this only.    Hypertension associated with diabetes    Hyperlipidemia associated with type 2 diabetes mellitus    Diabetic polyneuropathy associated with type 2 diabetes mellitus    Type 2 diabetes mellitus with stage 3 chronic kidney disease, with long-term current use of insulin, unspecified whether stage 3a or 3b CKD    Morbid obesity    Type 2 diabetes mellitus with other specified complication, with long-term current use of insulin      1. T1 - ELOISE - formerly treated as T2DM with hyperglycemia- Hgba1c goal is 7.5% or less without hypoglycemia - 7.6%---> 8.6%--> 8.3%-->  8.6% --> 8.1%  --> 11. 5% --> 8.3%--->8.9% currently.    discussed DM, progression of disease, long term complications, CV risk factors and tx options.   Advise compliance with ADA diet and encourage exercise- encourage proper food intake/but he likes to eat out a lot.   Continue on omnipod - switch from omnipod dash to omnipod 5 instead. Get him in automated mode.   Continue on lyumjev vials.    Basal rate 1.1 units/hour.- kept this from 12am - 7am   Did a split basal rate today - and did 7am to 12am - increased this to 1.2 units/hour.   Insulin to carb ratio 1:15 - he does not carb count though.   ISF 40   Max basal rate of 2 units/hour.   bg target of 120   Correct for bg over 180   Preset boluses - he does not carb count  2 units - snack.-- no changes   4 units small meal. -- changed to 6 units  6 units medium meal --- changed to 8 units.   8 units large meal.--- changed to 10 units.   Encouraged to do 10 untis for meals.   RX sent for toujeo =- 30 untis once daily as back up insulin pen for emergency use.   Had prescribed farxiga - but he never started - will leave off for now. His renal function is too fluid/unpredictable.   Discussed MOA, possible side effects including yeast infection, UTI, dehydration and ketoacidosis, importance of maintaining hydration and avoiding No carb diets. Good use hygiene. Notify my office if any side effects. Will monitor renal function closely. Stable at present.   Reviewed  hypoglycemia, s/s and appropriate tx. Have/get quick acting glucose tablets at hand.   Instructed to monitor Blood glucose 2 - 4x/day and bring meter/ log to every clinic visit.   Continue on dexcom G6 personal cgm. Will try to do omnipod 5 and get him in automated mode.   Sent to pharmacy.   A CGM is MEDICALLY NECESSARY as it will allow me to virtually and more closely monitor glucose readings  This enables me to make any necessary adjustments to the patients diabetes regimen while keeping the patient and  staff safe during the COVID-19 PHE.    2. HTN & Heart failure - usually controlled, did not take meds yet today -    continue meds as previously prescribed and monitor.   + urine mac  History of afib.   + history of CAD and hx cabg, as well as venous insufficiency bilat. Lower extremeties.   Last saw cardiology - Dr. Saavedra on 6/14/22.      3. Lipids- LDL goal < 100. At goal.   Currently on statin therapy    4. Weight - BMI Body mass index is 40.64 kg/m².   Encourage Ada diet and exercise as tolerated.   Former weight loss surgery.   Cannot do glp1 b/c of history of pancreatitis. I will consider it though in future considering his high risk profile.     5. Renal Function - ckd - stage 3 -   + microalbuminuria. No changes.     6,. Recent left leg and foot infection - healing/likely r/t hyperglycemia.    hx venous insufficiency and lymphedema.     7 hx fatty liver disease, cirrhosis, and resultatnt - pancreatitis - has followed with liver -   Right now stable trends -       Settings to be followed for Omnipod dash start -   Meet with DE to switch from omnipod dash to omnipod 5 - transfer settings - no changes -   Carb ratio start at 1: 10- we can decrease if needed.   Will need to carb count - but he is willing -   Continue dexcom -   Labs and OV in 3 months.

## 2023-03-17 ENCOUNTER — PATIENT MESSAGE (OUTPATIENT)
Dept: INTERNAL MEDICINE | Facility: CLINIC | Age: 68
End: 2023-03-17
Payer: MEDICARE

## 2023-03-29 ENCOUNTER — CLINICAL SUPPORT (OUTPATIENT)
Dept: DIABETES | Facility: CLINIC | Age: 68
End: 2023-03-29
Payer: MEDICARE

## 2023-03-29 DIAGNOSIS — E13.9 LADA (LATENT AUTOIMMUNE DIABETES IN ADULTS), MANAGED AS TYPE 1: ICD-10-CM

## 2023-03-29 PROCEDURE — G0108 DIAB MANAGE TRN  PER INDIV: HCPCS | Mod: S$GLB,,, | Performed by: DIETITIAN, REGISTERED

## 2023-03-29 PROCEDURE — G0108 PR DIAB MANAGE TRN  PER INDIV: ICD-10-PCS | Mod: S$GLB,,, | Performed by: DIETITIAN, REGISTERED

## 2023-03-29 NOTE — PROGRESS NOTES
Diabetes Care Specialist Progress Note  Author: Lori Rush RD, CDE  Date: 3/29/2023    Program Intake  Reason for Diabetes Program Visit:: Initial Diabetes Assessment (Omnipod 5 Upgrade)  Type of Intervention:: Individual  Individual: Device Training  Device Training: Insulin Pump Upgrade  Current diabetes risk level:: moderate    Lab Results   Component Value Date    HGBA1C 8.9 (H) 03/08/2023       Clinical    Problem Review  Reviewed Problem List with Patient: yes  Active comorbidities affecting diabetes self-care.: yes  Comorbidities: Neuropathy, Cardiovascular Disease, Hypertension, Chronic Kidney Disease  Reviewed health maintenance: yes    Clinical Assessment  Current Diabetes Treatment: Insulin pump, Insulin  Have you ever experienced hypoglycemia (low blood sugar)?: yes  Are you able to tell when your blood sugar is low?: Yes  What symptoms do you experience?: Mariana  How do you treat hypoglycemia (low blood sugar)?: 1/2 can soda/fruit juice, 4 glucose tablets  Have you ever experienced hyperglycemia (high blood sugar)?: yes  Are you able to tell when your blood sugar is high?: No (comment)    Medication Information  How do you obtain your medications?: Patient drives  How many days a week do you miss your medications?: Never  Do you sometimes have difficulty refilling your medications?: No  Medication adherence impacting ability to self-manage diabetes?: No    Labs  Do you have regular lab work to monitor your medications?: Yes  Type of Regular Lab Work: A1c    Nutritional Status  Diet: Regular    Additional Social History    Support  Does anyone support you with your diabetes care?: yes  Who supports you?: spouse, self  Who takes you to your medical appointments?: self  Does the current support meet the patient's needs?: Yes  Is Support an area impacting ability to self-manage diabetes?: No    Access to [employer]  Does the patient have access to any of the following devices or technologies?:  Smart phone  Media or technology needs impacting ability to self-manage diabetes?: No    Cognitive/Behavioral Health  Alert and Oriented: Yes  Difficulty Thinking: No  Requires Prompting: No  Requires assistance for routine expression?: No  Cognitive or behavioral barriers impacting ability to self-manage diabetes?: No    Culture/Muslim  Culture or Hindu beliefs that may impact ability to access healthcare: No    Communication  Language preference: English  Hearing Problems: No  Vision Problems: No  Communication needs impacting ability to self-manage diabetes?: No    Health Literacy  Preferred Learning Method: Face to Face  How often do you need to have someone help you read instructions, pamphlets, or written material from your doctor or pharmacy?: Never  Health literacy needs impacting ability to self-manage diabetes?: No      Diabetes Self-Management Skills Assessment    Diabetes Disease Process/Treatment Options  Patient/caregiver able to state what happens when someone has diabetes.: yes  Patient/caregiver knows what type of diabetes they have.: yes  Diabetes Type : Type I  Diabetes Disease Process/Treatment Options: Skills Assessment Completed: Yes  Assessment indicates:: Adequate understanding  Area of need?: No    Nutrition/Healthy Eating  Method of carbohydrate measurement:: no method  Patient can identify foods that impact blood sugar.: yes  Patient-identified foods:: sweets, starches (bread, pasta, rice, cereal), soda  Nutrition/Healthy Eating Skills Assessment Completed:: Yes  Assessment indicates:: Adequate understanding  Area of need?: No    Physical Activity/Exercise  Patient's daily activity level:: lightly active (Around the house)  Patient formally exercises outside of work.: no  Patient can identify forms of physical activity.: yes  Stated forms of physical activity:: any movement performed by muscles that uses energy  Patient can identify reasons why exercise/physical activity is important  in diabetes management.: yes  Identified reasons:: lowers blood glucose, blood pressure, and cholesterol  Physical Activity/Exercise Skills Assessment Completed: : Yes  Assessment indicates:: Adequate understanding  Area of need?: No    Medications  Patient is able to describe current diabetes management routine.: yes  Diabetes management routine:: insulin, insulin pump  Patient is able to identify current diabetes medications, dosages, and appropriate timing of medications.: yes  Patient understands the purpose of the medications taken for diabetes.: yes  Patient reports problems or concerns with current medication regimen.: no  Medication Skills Assessment Completed:: Yes  Assessment indicates:: Instruction Needed  Area of need?: Yes (Omnipod 5 upgrade)    Home Blood Glucose Monitoring  Patient states that blood sugar is checked at home daily.: yes  Monitoring Method:: personal continuous glucose monitor  Personal CGM type:: Dexcom  Patient is able to use personal CGM appropriately.: yes  Home Blood Glucose Monitoring Skills Assessment Completed: : Yes  Assessment indicates:: Adequate understanding  Area of need?: No    Acute Complications  Patient is able to identify types of acute complications: Yes  Patient Identified:: Hypoglycemia, Hyperglycemia  Acute Complications Skills Assessment Completed: : Yes  Assessment indicates:: Adequate understanding  Area of need?: No    Chronic Complications  Patient can identify major chronic complications of diabetes.: yes  Stated chronic complications:: heart disease/heart attack, kidney disease, retinopathy  Patient can identify ways to prevent or delay diabetes complications.: yes  Stated ways to prevent complications:: controlling blood sugar  Patient is taking statin?: Yes  Chronic Complications Skills Assessment Completed: : Yes  Assessment indicates:: Adequate understanding  Area of need?: No    Psychosocial/Coping  Patient can identify ways of coping with chronic  disease.: yes  Patient-stated ways of coping with chronic disease:: support from loved ones  Psychosocial/Coping Skills Assessment Completed: : Yes  Assessment indicates:: Adequate understanding  Area of need?: No    Assessment Summary and Plan    Based on today's diabetes care assessment, the following areas of need were identified:      Social 3/29/2023   Support No   Access to Mass Media/Tech No   Cognitive/Behavioral Health No   Culture/Zoroastrian No   Communication No   Health Literacy No        Clinical 3/29/2023   Medication Adherence No   Lab Compliance -   Nutritional Status -        Diabetes Self-Management Skills 3/29/2023   Diabetes Disease Process/Treatment Options No   Nutrition/Healthy Eating No   Physical Activity/Exercise No   Medication Yes - OMNI POD 5 INSULIN PUMP START    Pump training was provided per Omni Pod protocol.  Patient has used an insulin pump in the past.   Settings for pump transferred from current DASH pump.  Patient is not new to insulin pump therapy but this will be first Automated system he will be using.       Basal:  12AM: 7AM 1.1 units/hr  7AM-12AM 1.2     Max basal rate: 2 u/hr     Bolus:  CHO ratio: 1:1 (Does not count CHO - will enter 6, 8 or 10 for carbs to equal units of insulin  ISF: 1:40  Glucose target : 120 mg/dL  Correct  over: 200mg/dl (patient does not really want to use correction scale - discussed in detail and encouraged to use before meals. He tends to decide his own corrections.  Active insulin time: 4 hours  Max bolus: 15 units     Low reservoir insulin alarm: 5 units  Change pod alarm: every 3 days       Details of pump therapy were covered included following controller features and programming, pod activation, pod site selection and rotation, automatic pod priming and insertion, setting & editing basal rates in manual mode, giving bolus and other features in the set-up menu.  The following regarding the Omnipod 5 was covered:  During your first Pod wear,  since no recent history is available, the Omnipod 5 System uses only your active Basal Program from Manual Mode as a starting point to adjust your insulin. After 48 hours of history is collected, which usually happens with the first Pod wear, SmartELIKE technology stops adjusting insulin against your active Basal Program and starts using the Adaptive Basal Rate for your automated insulin delivery with your next Pod change. With each Pod change, insulin delivery information is sent and saved in the Omnipod 5 Ara so that the next Pod that is started is updated with the new Adaptive Basal Rate. With each new Pod activation, the system adapts insulin delivery based on physiological needs and total daily insulin (TDI) delivered. After 2-3 Pod changes, adaptation generally stabilizes and automated insulin delivery is based on this adaptation.  Patient demonstrated ability to program controller, activate and insert pod using aseptic technique.  Patient demonstrated ability to program Dexcom transmitter into controller and start automated limited mode.    Instructed pt on use of basic pump features ie...give a bolus, pause insulin, switch from manual to automated mode.  Reviewed features available in manual mode verses automated mode.   Reviewed when and how to use activity function in automated mode.  Reviewed site selection of pods, rotation of sites and hard stop to change pod every 72 hrs.   Instructed that insulin vial is good out of refrigeration for up to 28-30 days.   Reviewed treatment of hypoglycemia, hyperglycemia; sick day care, DKA, and troubleshooting of pump.  Omni Pod 24-hour support line provided.       Jessica username: Susan Lopez password: [email or web address]     Lou username: [email or web address]  Lou password: [email or web address]       Home Blood Glucose Monitoring No   Acute Complications No   Chronic Complications No   Psychosocial/Coping No          Today's interventions were provided through individual  discussion, instruction, and written materials were provided.      Patient verbalized understanding of instruction and written materials.  Pt was able to return back demonstration of instructions today. Patient understood key points, needs reinforcement and further instruction.     Diabetes Self-Management Care Plan:    Today's Diabetes Self-Management Care Plan was developed with Jian's input. Jian has agreed to work toward the following goal(s) to improve his/her overall diabetes control.      There are no recently modified care plans to display for this patient.      Follow Up Plan     Follow up in about 4 weeks (around 4/26/2023).    Today's care plan and follow up schedule was discussed with patient.  Jian verbalized understanding of the care plan, goals, and agrees to follow up plan.        The patient was encouraged to communicate with his/her health care provider/physician and care team regarding his/her condition(s) and treatment.  I provided the patient with my contact information today and encouraged to contact me via phone or [facility] Patient Portal as needed.     Length of Visit   Total Time: 120 Minutes

## 2023-04-03 ENCOUNTER — PATIENT MESSAGE (OUTPATIENT)
Dept: ADMINISTRATIVE | Facility: HOSPITAL | Age: 68
End: 2023-04-03
Payer: MEDICARE

## 2023-04-06 ENCOUNTER — LAB VISIT (OUTPATIENT)
Dept: LAB | Facility: HOSPITAL | Age: 68
End: 2023-04-06
Attending: INTERNAL MEDICINE
Payer: MEDICARE

## 2023-04-06 DIAGNOSIS — Z79.4 TYPE 2 DIABETES MELLITUS WITH DIABETIC NEUROPATHY, WITH LONG-TERM CURRENT USE OF INSULIN: ICD-10-CM

## 2023-04-06 DIAGNOSIS — E11.59 HYPERTENSION ASSOCIATED WITH DIABETES: ICD-10-CM

## 2023-04-06 DIAGNOSIS — E78.5 HYPERLIPIDEMIA ASSOCIATED WITH TYPE 2 DIABETES MELLITUS: ICD-10-CM

## 2023-04-06 DIAGNOSIS — I15.2 HYPERTENSION ASSOCIATED WITH DIABETES: ICD-10-CM

## 2023-04-06 DIAGNOSIS — Z00.00 ANNUAL PHYSICAL EXAM: ICD-10-CM

## 2023-04-06 DIAGNOSIS — E11.69 HYPERLIPIDEMIA ASSOCIATED WITH TYPE 2 DIABETES MELLITUS: ICD-10-CM

## 2023-04-06 DIAGNOSIS — E11.40 TYPE 2 DIABETES MELLITUS WITH DIABETIC NEUROPATHY, WITH LONG-TERM CURRENT USE OF INSULIN: ICD-10-CM

## 2023-04-06 LAB
BACTERIA #/AREA URNS AUTO: NORMAL /HPF
BILIRUB UR QL STRIP: NEGATIVE
CLARITY UR REFRACT.AUTO: CLEAR
COLOR UR AUTO: YELLOW
GLUCOSE UR QL STRIP: NEGATIVE
HGB UR QL STRIP: ABNORMAL
HYALINE CASTS UR QL AUTO: 1 /LPF
KETONES UR QL STRIP: NEGATIVE
LEUKOCYTE ESTERASE UR QL STRIP: NEGATIVE
MICROSCOPIC COMMENT: ABNORMAL
MICROSCOPIC COMMENT: NORMAL
NITRITE UR QL STRIP: NEGATIVE
PH UR STRIP: 5 [PH] (ref 5–8)
PROT UR QL STRIP: ABNORMAL
RBC #/AREA URNS AUTO: 4 /HPF (ref 0–4)
RBC #/AREA URNS AUTO: 7 /HPF (ref 0–4)
SP GR UR STRIP: 1.01 (ref 1–1.03)
URN SPEC COLLECT METH UR: ABNORMAL
WBC #/AREA URNS AUTO: 3 /HPF (ref 0–5)
WBC #/AREA URNS AUTO: 3 /HPF (ref 0–5)

## 2023-04-06 PROCEDURE — 81001 URINALYSIS AUTO W/SCOPE: CPT | Performed by: INTERNAL MEDICINE

## 2023-04-13 ENCOUNTER — PATIENT OUTREACH (OUTPATIENT)
Dept: ADMINISTRATIVE | Facility: HOSPITAL | Age: 68
End: 2023-04-13
Payer: MEDICARE

## 2023-04-13 NOTE — PROGRESS NOTES
Health Maintenance Due   Topic Date Due    Shingles Vaccine (1 of 2) Never done    Pneumococcal Vaccines (Age 65+) (2 - PCV) 10/26/2021    COVID-19 Vaccine (4 - Booster for Moderna series) 01/17/2022    Eye Exam  06/10/2022    Influenza Vaccine (1) 09/01/2022    Foot Exam  09/21/2022     Chart reviewed.   Immunizations: Reconciled  Orders placed: N/A  Upcoming appts to satisfy AMANDA topics: N/A

## 2023-04-27 ENCOUNTER — CLINICAL SUPPORT (OUTPATIENT)
Dept: DIABETES | Facility: CLINIC | Age: 68
End: 2023-04-27
Payer: MEDICARE

## 2023-04-27 DIAGNOSIS — Z79.4 TYPE 2 DIABETES MELLITUS WITH HYPERGLYCEMIA, WITH LONG-TERM CURRENT USE OF INSULIN: Primary | ICD-10-CM

## 2023-04-27 DIAGNOSIS — E11.65 TYPE 2 DIABETES MELLITUS WITH HYPERGLYCEMIA, WITH LONG-TERM CURRENT USE OF INSULIN: Primary | ICD-10-CM

## 2023-04-27 PROCEDURE — G0108 DIAB MANAGE TRN  PER INDIV: HCPCS | Mod: S$GLB,,, | Performed by: DIETITIAN, REGISTERED

## 2023-04-27 PROCEDURE — G0108 PR DIAB MANAGE TRN  PER INDIV: ICD-10-PCS | Mod: S$GLB,,, | Performed by: DIETITIAN, REGISTERED

## 2023-04-27 NOTE — PROGRESS NOTES
Diabetes Care Specialist Progress Note  Author: Lori Rush RD, CDE  Date: 4/27/2023    Program Intake  Reason for Diabetes Program Visit:: Initial Diabetes Assessment  Type of Intervention:: Individual  Individual: Education  Education: Pattern Management    Lab Results   Component Value Date    HGBA1C 8.1 (H) 04/06/2023     Patient returns for Omnipod 5 follow up. Switched from DASH to Omnipod 5 on 3/29. [place] report reviewed with patient. He is not bolusing for meals. He is only entering BG and bolusing for BG only when he eats. Reviewed that he should be entering 6, 8, or 10 in the first carb box if eating small, medium or large meal. Discussed how to choose between small, medium or large meal. For now, he will choose 6 or 8 depending on what he is eating - he has fear of hypoglycemia so often tends to hold boluses. Noted Dexcom transmitter needs to be refilled - currently working but past expiration date of 4/13 so advised will likely need to change soon. Reviewed how to change SN in controller when he pairs new transmitter with Dexcom tatyana. Reviewed how to switch between automated and manual mode. Advised he wants to be in automated mode as much as possible. If he gets kicked out of automated, reminded him that he needs to switch back after 5 min - he often forgets to do this and will remain in manual mode. BGs noted to be about 20% better when consistently in automated mode and believe TIR would improve further if patient starts to bolus for his meals. All questions and concerns addressed.    Assessment Summary and Plan    Based on today's diabetes care assessment, the following areas of need were identified:      Social 3/29/2023   Support No   Access to Mass Media/Tech No   Cognitive/Behavioral Health No   Culture/Hinduism No   Communication No   Health Literacy No        Clinical 3/29/2023   Medication Adherence No   Lab Compliance -   Nutritional Status -        Diabetes Self-Management Skills 3/29/2023    Diabetes Disease Process/Treatment Options No   Nutrition/Healthy Eating No   Physical Activity/Exercise No   Medication Yes   Home Blood Glucose Monitoring No   Acute Complications No   Chronic Complications No   Psychosocial/Coping No          Today's interventions were provided through individual discussion, instruction, and written materials were provided.      Patient verbalized understanding of instruction and written materials.  Pt was able to return back demonstration of instructions today. Patient understood key points, needs reinforcement and further instruction.     Diabetes Self-Management Care Plan:    Today's Diabetes Self-Management Care Plan was developed with Jian's input. Jian has agreed to work toward the following goal(s) to improve his/her overall diabetes control.      There are no recently modified care plans to display for this patient.      Follow Up Plan     Follow up in about 6 weeks (around 6/7/2023).    Today's care plan and follow up schedule was discussed with patient.  Jian verbalized understanding of the care plan, goals, and agrees to follow up plan.        The patient was encouraged to communicate with his/her health care provider/physician and care team regarding his/her condition(s) and treatment.  I provided the patient with my contact information today and encouraged to contact me via phone or [facility] Patient Portal as needed.     Length of Visit   Total Time: 60 Minutes

## 2023-05-10 DIAGNOSIS — E13.9 LADA (LATENT AUTOIMMUNE DIABETES IN ADULTS), MANAGED AS TYPE 1: Primary | ICD-10-CM

## 2023-05-10 RX ORDER — INSULIN GLARGINE 300 U/ML
30 INJECTION, SOLUTION SUBCUTANEOUS DAILY
Qty: 15 ML | Refills: 1 | Status: SHIPPED | OUTPATIENT
Start: 2023-05-10 | End: 2023-10-12 | Stop reason: SDUPTHER

## 2023-05-25 ENCOUNTER — TELEPHONE (OUTPATIENT)
Dept: INTERNAL MEDICINE | Facility: CLINIC | Age: 68
End: 2023-05-25
Payer: MEDICARE

## 2023-05-25 ENCOUNTER — NURSE TRIAGE (OUTPATIENT)
Dept: ADMINISTRATIVE | Facility: CLINIC | Age: 68
End: 2023-05-25
Payer: MEDICARE

## 2023-05-25 NOTE — TELEPHONE ENCOUNTER
"Spoke with the pt, let him know Ivanna recommendations, he denied he is going to get evaluated and insisted we give him wound care orders. Let him know at this time he needs to be evaluated so he needs to get in touch with his primary care provider for next steps. He verbalized understanding and stated" he will call them"    "

## 2023-05-25 NOTE — TELEPHONE ENCOUNTER
[place]    PCP:  Dr. Leon Barajas    C/O BLE swollen (knee down), blisters, and weeping fluid.  He is trying to get a referral to the [place] diabetic wound care clinic.  Denies SOB and CP.  Per protocol, care advised is go to [facility] now.  Pt refuses care advice.  Care advice reinforced but he continues to refuse care advice.  Pt wants to speak with PCP for referral for the diabetic clinic.  Advised to call for worsening/questions/concerns.  VU.  Message routed high priority to PCP.    Reason for Disposition   SEVERE swelling (e.g., swelling extends above knee, entire leg is swollen, weeping fluid)    Additional Information   Negative: Sounds like a life-threatening emergency to the triager   Negative: Difficulty breathing at rest   Negative: Entire foot is cool or blue in comparison to other side    Protocols used: Leg Swelling and Edema-A-OH

## 2023-05-25 NOTE — TELEPHONE ENCOUNTER
----- Message from Martinaelza Downey sent at 5/25/2023  2:44 PM CDT -----  Contact: [phone]  Patient would like to get a referral.  Referral to what specialty:  Wound care  Does the patient want the referral with a specific physician:    Is the specialist [facility]:  [facility]  Reason (be specific):  leg swollen and sleeping   Does the patient already have the specialty clinic appointment scheduled:    If yes, what date is the appointment scheduled:     Is the insurance listed in Epic correct? (this is important for a referral): yes   Advised patient that once provider approves this either a nurse or  will return their call?:   Would the patient like a call back, or a response through their [facility] portal?:   phone    Comments: patient called, requested a call back from Dr Barajas's nurse in regards needing referral to [facility] wound care (did not have a phone number of fax number or doctor name).Please call and advise. Thank you

## 2023-06-07 ENCOUNTER — CLINICAL SUPPORT (OUTPATIENT)
Dept: DIABETES | Facility: CLINIC | Age: 68
End: 2023-06-07
Payer: MEDICARE

## 2023-06-07 ENCOUNTER — OFFICE VISIT (OUTPATIENT)
Dept: INTERNAL MEDICINE | Facility: CLINIC | Age: 68
End: 2023-06-07
Payer: MEDICARE

## 2023-06-07 VITALS
HEART RATE: 78 BPM | DIASTOLIC BLOOD PRESSURE: 78 MMHG | OXYGEN SATURATION: 98 % | SYSTOLIC BLOOD PRESSURE: 120 MMHG | RESPIRATION RATE: 16 BRPM | WEIGHT: 266.56 LBS | BODY MASS INDEX: 41.84 KG/M2 | HEIGHT: 67 IN | TEMPERATURE: 98 F

## 2023-06-07 DIAGNOSIS — L03.116 CELLULITIS AND ABSCESS OF LEFT LEG: Primary | ICD-10-CM

## 2023-06-07 DIAGNOSIS — Z79.4 TYPE 2 DIABETES MELLITUS WITH HYPERGLYCEMIA, WITH LONG-TERM CURRENT USE OF INSULIN: Primary | ICD-10-CM

## 2023-06-07 DIAGNOSIS — E10.8 DIABETES MELLITUS TYPE 1, WITH COMPLICATION, ON LONG TERM INSULIN PUMP: ICD-10-CM

## 2023-06-07 DIAGNOSIS — L08.9 LOCAL INFECTION OF WOUND: ICD-10-CM

## 2023-06-07 DIAGNOSIS — E11.59 HYPERTENSION ASSOCIATED WITH DIABETES: Chronic | ICD-10-CM

## 2023-06-07 DIAGNOSIS — E11.65 TYPE 2 DIABETES MELLITUS WITH HYPERGLYCEMIA, WITH LONG-TERM CURRENT USE OF INSULIN: Primary | ICD-10-CM

## 2023-06-07 DIAGNOSIS — I89.0 LYMPHEDEMA OF BOTH LOWER EXTREMITIES: Chronic | ICD-10-CM

## 2023-06-07 DIAGNOSIS — E11.40 TYPE 2 DIABETES MELLITUS WITH DIABETIC NEUROPATHY, WITH LONG-TERM CURRENT USE OF INSULIN: Chronic | ICD-10-CM

## 2023-06-07 DIAGNOSIS — Z96.41 DIABETES MELLITUS TYPE 1, WITH COMPLICATION, ON LONG TERM INSULIN PUMP: ICD-10-CM

## 2023-06-07 DIAGNOSIS — E78.5 HYPERLIPIDEMIA ASSOCIATED WITH TYPE 2 DIABETES MELLITUS: Chronic | ICD-10-CM

## 2023-06-07 DIAGNOSIS — I48.0 PAROXYSMAL ATRIAL FIBRILLATION: ICD-10-CM

## 2023-06-07 DIAGNOSIS — I15.2 HYPERTENSION ASSOCIATED WITH DIABETES: Chronic | ICD-10-CM

## 2023-06-07 DIAGNOSIS — E13.9 LADA (LATENT AUTOIMMUNE DIABETES IN ADULTS), MANAGED AS TYPE 1: ICD-10-CM

## 2023-06-07 DIAGNOSIS — E11.42 DIABETIC POLYNEUROPATHY ASSOCIATED WITH TYPE 2 DIABETES MELLITUS: ICD-10-CM

## 2023-06-07 DIAGNOSIS — I50.42 CHRONIC COMBINED SYSTOLIC AND DIASTOLIC HEART FAILURE: ICD-10-CM

## 2023-06-07 DIAGNOSIS — Z79.01 CURRENT USE OF LONG TERM ANTICOAGULATION: ICD-10-CM

## 2023-06-07 DIAGNOSIS — Z79.4 TYPE 2 DIABETES MELLITUS WITH DIABETIC NEUROPATHY, WITH LONG-TERM CURRENT USE OF INSULIN: Chronic | ICD-10-CM

## 2023-06-07 DIAGNOSIS — T14.8XXA LOCAL INFECTION OF WOUND: ICD-10-CM

## 2023-06-07 DIAGNOSIS — L02.416 CELLULITIS AND ABSCESS OF LEFT LEG: Primary | ICD-10-CM

## 2023-06-07 DIAGNOSIS — Z98.84 STATUS POST BARIATRIC SURGERY: ICD-10-CM

## 2023-06-07 DIAGNOSIS — G47.30 SLEEP APNEA, UNSPECIFIED TYPE: ICD-10-CM

## 2023-06-07 DIAGNOSIS — E11.69 HYPERLIPIDEMIA ASSOCIATED WITH TYPE 2 DIABETES MELLITUS: Chronic | ICD-10-CM

## 2023-06-07 PROCEDURE — 3052F HG A1C>EQUAL 8.0%<EQUAL 9.0%: CPT | Mod: CPTII,S$GLB,, | Performed by: NURSE PRACTITIONER

## 2023-06-07 PROCEDURE — 99215 OFFICE O/P EST HI 40 MIN: CPT | Mod: S$GLB,,, | Performed by: NURSE PRACTITIONER

## 2023-06-07 PROCEDURE — 3062F PR POS MACROALBUMINURIA RESULT DOCUMENTED/REVIEW: ICD-10-PCS | Mod: CPTII,S$GLB,, | Performed by: NURSE PRACTITIONER

## 2023-06-07 PROCEDURE — 99215 PR OFFICE/OUTPT VISIT, EST, LEVL V, 40-54 MIN: ICD-10-PCS | Mod: S$GLB,,, | Performed by: NURSE PRACTITIONER

## 2023-06-07 PROCEDURE — 1126F PR PAIN SEVERITY QUANTIFIED, NO PAIN PRESENT: ICD-10-PCS | Mod: CPTII,S$GLB,, | Performed by: NURSE PRACTITIONER

## 2023-06-07 PROCEDURE — 3008F PR BODY MASS INDEX (BMI) DOCUMENTED: ICD-10-PCS | Mod: CPTII,S$GLB,, | Performed by: NURSE PRACTITIONER

## 2023-06-07 PROCEDURE — 3066F PR DOCUMENTATION OF TREATMENT FOR NEPHROPATHY: ICD-10-PCS | Mod: CPTII,S$GLB,, | Performed by: NURSE PRACTITIONER

## 2023-06-07 PROCEDURE — 3078F DIAST BP <80 MM HG: CPT | Mod: CPTII,S$GLB,, | Performed by: NURSE PRACTITIONER

## 2023-06-07 PROCEDURE — 1159F PR MEDICATION LIST DOCUMENTED IN MEDICAL RECORD: ICD-10-PCS | Mod: CPTII,S$GLB,, | Performed by: NURSE PRACTITIONER

## 2023-06-07 PROCEDURE — 3008F BODY MASS INDEX DOCD: CPT | Mod: CPTII,S$GLB,, | Performed by: NURSE PRACTITIONER

## 2023-06-07 PROCEDURE — 95251 CONT GLUC MNTR ANALYSIS I&R: CPT | Mod: S$GLB,,, | Performed by: NURSE PRACTITIONER

## 2023-06-07 PROCEDURE — 3074F PR MOST RECENT SYSTOLIC BLOOD PRESSURE < 130 MM HG: ICD-10-PCS | Mod: CPTII,S$GLB,, | Performed by: NURSE PRACTITIONER

## 2023-06-07 PROCEDURE — G0108 PR DIAB MANAGE TRN  PER INDIV: ICD-10-PCS | Mod: S$GLB,,, | Performed by: DIETITIAN, REGISTERED

## 2023-06-07 PROCEDURE — 99999 PR PBB SHADOW E&M-EST. PATIENT-LVL V: CPT | Mod: PBBFAC,,, | Performed by: NURSE PRACTITIONER

## 2023-06-07 PROCEDURE — 1160F PR REVIEW ALL MEDS BY PRESCRIBER/CLIN PHARMACIST DOCUMENTED: ICD-10-PCS | Mod: CPTII,S$GLB,, | Performed by: NURSE PRACTITIONER

## 2023-06-07 PROCEDURE — 3078F PR MOST RECENT DIASTOLIC BLOOD PRESSURE < 80 MM HG: ICD-10-PCS | Mod: CPTII,S$GLB,, | Performed by: NURSE PRACTITIONER

## 2023-06-07 PROCEDURE — 1159F MED LIST DOCD IN RCRD: CPT | Mod: CPTII,S$GLB,, | Performed by: NURSE PRACTITIONER

## 2023-06-07 PROCEDURE — 99999 PR PBB SHADOW E&M-EST. PATIENT-LVL V: ICD-10-PCS | Mod: PBBFAC,,, | Performed by: NURSE PRACTITIONER

## 2023-06-07 PROCEDURE — G0108 DIAB MANAGE TRN  PER INDIV: HCPCS | Mod: S$GLB,,, | Performed by: DIETITIAN, REGISTERED

## 2023-06-07 PROCEDURE — 3062F POS MACROALBUMINURIA REV: CPT | Mod: CPTII,S$GLB,, | Performed by: NURSE PRACTITIONER

## 2023-06-07 PROCEDURE — 1160F RVW MEDS BY RX/DR IN RCRD: CPT | Mod: CPTII,S$GLB,, | Performed by: NURSE PRACTITIONER

## 2023-06-07 PROCEDURE — 1126F AMNT PAIN NOTED NONE PRSNT: CPT | Mod: CPTII,S$GLB,, | Performed by: NURSE PRACTITIONER

## 2023-06-07 PROCEDURE — 3288F PR FALLS RISK ASSESSMENT DOCUMENTED: ICD-10-PCS | Mod: CPTII,S$GLB,, | Performed by: NURSE PRACTITIONER

## 2023-06-07 PROCEDURE — 3288F FALL RISK ASSESSMENT DOCD: CPT | Mod: CPTII,S$GLB,, | Performed by: NURSE PRACTITIONER

## 2023-06-07 PROCEDURE — 3074F SYST BP LT 130 MM HG: CPT | Mod: CPTII,S$GLB,, | Performed by: NURSE PRACTITIONER

## 2023-06-07 PROCEDURE — 3066F NEPHROPATHY DOC TX: CPT | Mod: CPTII,S$GLB,, | Performed by: NURSE PRACTITIONER

## 2023-06-07 PROCEDURE — 3052F PR MOST RECENT HEMOGLOBIN A1C LEVEL 8.0 - < 9.0%: ICD-10-PCS | Mod: CPTII,S$GLB,, | Performed by: NURSE PRACTITIONER

## 2023-06-07 PROCEDURE — 95251 PR GLUCOSE MONITOR, 72 HOUR, PHYS INTERP: ICD-10-PCS | Mod: S$GLB,,, | Performed by: NURSE PRACTITIONER

## 2023-06-07 PROCEDURE — 1101F PR PT FALLS ASSESS DOC 0-1 FALLS W/OUT INJ PAST YR: ICD-10-PCS | Mod: CPTII,S$GLB,, | Performed by: NURSE PRACTITIONER

## 2023-06-07 PROCEDURE — 1101F PT FALLS ASSESS-DOCD LE1/YR: CPT | Mod: CPTII,S$GLB,, | Performed by: NURSE PRACTITIONER

## 2023-06-07 RX ORDER — DOXYCYCLINE HYCLATE 100 MG
100 TABLET ORAL 2 TIMES DAILY
Qty: 20 TABLET | Refills: 0 | Status: SHIPPED | OUTPATIENT
Start: 2023-06-07 | End: 2023-06-20 | Stop reason: ALTCHOICE

## 2023-06-07 NOTE — PROGRESS NOTES
"67 y.o. gentleman, here for 3 month follow up for management of t1dm - ELOISE and history of pancreatitis   Last seen march 2023 - those notes below.     Today, was an add on visit - b/c he is concerned about infection or swelling in his legs.   He is feeling like legs are swollen, but actually "better" overall -   He had admission for cellulitis in February 2023 - and finished his antibiotics for this.   Had gone to the woundcare clinic in [place] in the past, but wants a referral to return back.   Usually wears BRANNON hose daily -   See photos below of his legs -   States he took an oral antibiotic about 6 weeks ago - finished the course.   Left leg is worse than the right.     A1c last in April was 8.1%. (that is improved from 8.9% last year).   He continues on omnipod - was on dash  and now is on omnipod 5 with dexcom g6   Using with lyumjev insulin.   He doesn't truly "carb count" - uses preset boluses - we have CR setting as 1:1 so he can accomplish this.   Often forgets to bolus or isn't always staying in automated mode.   His glucose will go high, and then he will go into manual mode and forget to go back into automated mode.     TDD - 28.3 units/day   88% is coming from basal rate.   12% is coming from bolusing.   74% of the day he stays in automated mode -   26% in manual mode. -   Entering in around 6.7 carbs per day (but this is presets - he is not doing carb counting).   Active insulin time is 4 hours.   12am - 1.1 units/hour -   7am - 1.2 units/hour -   Carb ratio set at 1: 10 -   Bg target set at 120 -   ISF set at 40   Bg correction 200 -   Max basal rate is 2 units/hour -       Dexcom g6 downloaded and reviewed today -   See scanned in .   Average glucose 199 -   31% highs.   21% very highs.   47% time in range.   <1% lows.   0% very lows        Last seen in March 2023 -   67 y.o. male here for routine follow up for Type 1 -   Last seen august 2022 - those notes are below.     A1c still " elevated at 8.9%   Currently on omnipod dash - began on pump from VGO as of 8/17/2022 -   He changes about every 3 days.   Does not follow diabetic diet.     Pump downloaded and reviewed -   Total dose is 42.1 untis/day.   60% is coming from basal rate.   40% is coming from bolusing   2.5 boluses per day on average.   Active insulin time is 4 hours.   Basal rate is 1.1 units/hour.   ISF is at 50   Carb ratio 1: 15 - however he doesn't carb count -   Has bolus presets -   2 unit snack.   4 units small meal   6 unit medium meal.   8 units large meal.     Dexcom g6 - getting supplies from [place] [facility]   Average glucose 251 -   27% TIR   0% lows.   26% highs   47% very high.     He said that he's been told that he can get his dexcom from the pharmacy. So he wants me to send prescription there for him for his dexcom supplies.         Last visit notes from August 2022:   66 y.o. male here for 6 week follow up for management of T2dm - ELOISE - with low cpeptide levels in past.   He has been insulin dependent -   Last seen 6/29/222 - those notes below.     His a1c had gone up from 8.1% to 11.5%.   No new labs have been done for today's visit.   History of pancreatitis in 2018 - so he has not used  glp1s or dpp4's.   We have not used sglt2i's on him b/c of ELOISE status - but I am not opposed to using.   See below: cpeptide present, just low.   C-Peptide 0.78 - 5.19 ng/mL 0.67 Low     His Pelon is negative.     Continues on a vgo 30 and we had planned for him to get an omnipod to get better control of insulin needs/hyperglycemic episodes.   Reports giving still just 2 clicks with meals. He's scared to give too many clicks for fear of lows.   He has still not met with the diabetes educator to start on omnipod dash.   has his supplies - dash pods and pdm, and his lyumjev vial - he just not had made an appointment.   He continues to have hyperglycemia.     In the interim - he's suffered with kidney stones and is following  "with urology -   Also is following closely with woundcare, lymphedema in bilat. Lower extremities.       continues on Dexcom g6 - personal   downloaded personal and reviewed today -   Average glucose is 251 -   27% time in range.   <1% lows.   25% highs.   47% very highs.       Last visit notes as follows from 6/29/2022:   66 y.o. male here for 3 month follow up visit for management of T1dm -   ELOISE - formerly treated as type 2 -   History of pancreatitis.   Last seen in March 2022 - those notes are below.     a1c has been elevated in past, now extremely high   - up from 8.1% now to 11.5%   He's been very stressed since last visit -   Mother in law passed away - he's been very busy cleaning out her house.   Working a lot/sweating - states his vgo patch is just falling off.   He is nervous to be low/go low, so sometimes doesn't click with his meals.   Also got an infection in his left foot and in his calf in the interim - it has been treated.   But he isn't sure if his sugars were high prior and that's what caused the infection, or if the infection is what has caused his sugars to go high.     He is on vgo 30 still - using humalog insulin with it.   Usually gives 2 or 4 clicks with meals.   Often only "clicks" 1 or 2 times per day.   He denies any known low sugars, except for if he boluses "too much".   He is not following Ada diet.     Using dexcom g6 cgm - downloaded and reviewed today -   Using with his cellphone - see scanned in .     Average glucose 275 -   15% time in range.   0% lows.   23% highs.   61% very highs        Last visit notes as follows from march 2022:   66 y.o. gentleman, here for 3 month follow up visit oer routine for Eloise - T1dm -   Last seen in December 2021 - those notes are below.     a1c decreased from 8.6% to 8.1%.   He increased from the vgo 20 to vgo 30 - still using humalog insulin   That has helped his overnight sugars, but he is still high during the day.   He often " gives 4 clicks instead of 3 clicks with meals.   States sometimes he runs out of insulin in his patch if he gives too much.   We had discussed omnipod at his last visit, but he wanted to try the higher dose vgo patch.   He is not following Ada diet -     On dexcom G6 personal to check his sugars  Downloaded and reviewed today -   See scanned in  -   Average glucose 239 -   30% time in range.   <1% lows.   27% highs.   42% very highs.       Last visit notes from December 2021 -   66 y.o. T1dm - ELOISE - 3 month follow up.   Last seen in Septemer 2021 - those notes are velow.     His a1c remains elevated still @ 8.6%   Continues on VGO 20 -   States using same insulin - humalog -  4 clicks with meals.   No longer on metformin.   He has history of pancreatitis (2018) -  So cannot take glp1 or dpp4's -     He is not following ADA diet.   Eats out often, fried foods, high carb foods.   States he occasionally has lows when he gives boluses after he eats instead of before the meal.   He complains of right shoulder pain today -   Follows with Dr. Diaz - and is seeing him for kidney stone follow up.   Also c/o ED - has used viagra in past. Requests appt. With Dr. Diaz    Continues on Dexcom G6 - see scanned in .   Average glucose 258 -   24% time in range.   1% lows.   <1% very low. (states following a bolus/correction bolus).   26% highs.   48% extreme highs.       Last visit notes as follows from September 2021:   HPI: Jian Arrieta is a 67 y.o.  male c/I for visit to address Diabetes Type 1 (ELOISE adult onset)   This is the first time I am seeing him for care, follows with Dr. Leon Barajas,  for primary care needs.   Has seen endocrinology - Luisa Garcia NP in past - last visit was 8/9/2021 - those notes are below.  Had seen Dr. Carney and dr. Stauffer with endocrinology in past prior visits.     he is establishing care with me today however.   Met last with GINO Machado 8/23/2021 -      was diagnosed with T2DM originally in 2016 -   He has history of pancreatitis in 2018 - portal vein thrombosis and underwent angioplasty.   now treated as T1 - ELOISE -   Had gallbladder removed as well.     Is on insulin only now via vgo patch - on VGO 20    Has never been hospitalized r/t DM.  Denies missing doses of DM medication - however admits he misses he meal time dose of insulin.     Hgba1c has increased from 7.7%  to 8.6%- now improved to 8.3% currently.   Not always following ADA diet. Eats out 2 - 3 times per day.   Local restaurants/pastas/fried food/waffle house.   vgo 20 - 2 - 3 clicks with breakfast.   4 clicks with lunch and dinner.   1 click with a snack.     He reports forgetting to click sometimes - and waits to click after meals.   Also fearful of hypoglycemia - so will under click.   Working in yard/increased activity or sweating - has low blood sugars.   He has baqsimi to correct     On dexcom g6 for checking sugars - downloaded and reviewed today -   Average glucose 243  32% time in range.   0% lows.   24% highs.   44% extreme highs.     Complications from diabetes and pertinent co-morbidities include:   Negative for DKA.   Has been on insulin for several years.   + for diabetic neuropathy.   + for nephropathy - CKD stage 3   + microalbuminuria.   Eyes:  negative for Diabetic retinopathy   CV: Denies history of MI nor stroke.   CAD: yes and history of CABG   Also heart failure history   Takes aspirin 81mg tablet daily  BP: has history of HTN  Statin: Taking  ACE/ARB: Not taking    Last notes from RADHA Garcia from August 2021 as follows:   Pt returns for follow up of type 2 diabetes complicated by Charcot foot, foot ulcer-now resolved, kidney stone, BMI 39, CKD stage III, s/p sleeve gastrectomy that is uncontrolled and complicated.  Previously seen by Dr. Carney and Dr. Stauffer and myself. Last visit in April 2021.   He has dentures!!    He had hydrocele repair on 6/16/2021 -- also had kidney  stones at the time. Had stent placed and removed. States he has kidney stones on the left and will be seeing Dr. Diaz.      With regards to the diabetes:   Diagnosed with Type 2 DM in 2016  Episode of pancreatitis around 2018,   Family History of Type 2 DM: none  Known complications:   DKA/HHS: none  RN: none; 6/10/21 -  PN: +; was seeing podiatry in the past  Nephropathy: + sees nephrology-- needs appt  Gastroparesis: none  CAD: CABG around 2016  Diabetes complications: CKD stage III, s/p sleeve gastrectomy   Current regimen:  Vgo 20 4 clicks with oatmeal and 3 clicks with breakfast; 4 clicks with lunch and dinner; 1-2 clicks with a snack; sometimes more for a snack   He is not running out of clicks at the end of the day.    He is sometimes taking Vgo off overnight to prevent hypoglycemia. States he is sometimes having hypoglycemia between 1-4AM. States he started doing this a couple of months ago.Takes off around 9-10PM and wakes up at 4-5AM and puts new Vgo on at 6AM. He is eating around 5-6 AM and puts on Vgo after he eats. In the past he was wearing Vgo for over 24 hours, still doing this.    He is fearful of hypoglycemia and overcorrecting.   Missed doses-missing clicks as above   Other medications tried:  Metformin   He is checking BG 4 times daily  Wearing Dexcom today. See media tab for report  His blood sugars are elevated after breakfast and throughout the day. He is putting on a new device sometimes after he eats breakfast which contributes to his hyperglycemia. He is having hyperglycemia at times overnight and he will take off Vgo.   He forgetting to click at times or clicking after he eats.  On days that he forgets to click, his blood sugars stay persistently high. His blood sugars come down nicely when he clicks.    He reports Dexcom and Vgo is falling off due to sweating badly.   Fasting:    Patient feels unwell with blood sugars less than 110. He loses ability to function with low blood sugars. His  lowest blood sugar has been in the 40s (around 2020). Seen at [facility].    Dietary recall: He is not following diabetic diet. Appetite is good. Does not eat as much as before.   Eats 3 meals a day.   B: 5AM-waffle house or oatmeal or grits  L: 11:30  D: 7PM  Snacks: Grazing during the day  Drinks: tea and water    Exercise - tried to stay active.  Active in the yard, garage.    Education - last visit: 2021 -EZEQUIEL Palacios/[place]: has    Social History     Tobacco Use   Smoking Status Former    Packs/day: 2.00    Years: 30.00    Pack years: 60.00    Types: Cigarettes    Quit date: 2005    Years since quittin.3   Smokeless Tobacco Never     Past medical History:   Past Medical History:   Diagnosis Date    Alcohol abuse     Anasarca 2019    Anemia     Anticoagulant long-term use     Arthropathy associated with neurological disorder 2015    Atherosclerosis     Charcot foot due to diabetes mellitus     Chronic combined systolic and diastolic heart failure 2019 Left VentricleModerate decreased ejection fraction at 30%. Normal left ventricular cavity size. Normal wall thickness observed. Grade I (mild) left ventricular diastolic dysfunction consistent with impaired relaxation. Normal left atrial pressure. Moderate, global hypokinesis(see wall scoring diagram). Right VentricleNormal cavity size, wall thickness and ejection fraction. Wall motion n    Chronic pancreatitis 2019    CKD (chronic kidney disease) stage 3, GFR 30-59 ml/min     CKD (chronic kidney disease) stage 4, GFR 15-29 ml/min     Colon polyps     approx 5 yrs ago    Coronary artery disease due to calcified coronary lesion 2015    5 stents on ASA      Diabetic polyneuropathy associated with type 2 diabetes mellitus 2015    Diverticulosis 2019    DM type 2 with diabetic peripheral neuropathy 2019    Encounter for blood transfusion     Essential hypertension 2019    Former  smoker 8/26/2015    Healed ulcer of left foot on examination 6/20/2017    Hematuria     Hydrocele     approx 1.5 yrs ago    Hyperphosphatemia     Hypoalbuminemia 2/4/2019    Hypocalcemia     Lymphedema of both lower extremities 1/29/2019    Mixed hyperlipidemia 5/8/2015    Morbid obesity with BMI of 50.0-59.9, adult 5/8/2015    Obstruction of right ureteropelvic junction (UPJ) due to stone 5/24/2021    Onychomycosis of multiple toenails with type 2 diabetes mellitus and peripheral neuropathy 6/20/2017    Perianal cyst     approx 2 yrs ago    Proteinuria     Pseudocyst of pancreas 1/28/2019 1-28-19 Liver has a cirrhotic morphology with no focal lesions.  Significant interval increase in ascites when compared to prior exam which may account for patient's abdominal distension.  Hypodense air-fluid collection along the body of the pancreas which is slightly smaller when compared to prior CT.  Findings may relate to pancreatic necrosis with pancreatic pseudocysts with infected pseudocyst    Skin cancer     skin cancer    Sleep apnea 8/26/2015    Status post bariatric surgery 1/11/2016    Type 2 diabetes mellitus, with long-term current use of insulin 5/8/2015    Urinary tract infection       Family hx:   Family History   Problem Relation Age of Onset    Cancer Mother     Cancer Father     Heart disease Father     Obesity Sister     Parkinsonism Brother     No Known Problems Paternal Grandmother     Cancer Paternal Grandfather     Cancer Brother     Diabetes Maternal Grandmother     Stroke Maternal Grandfather     Cirrhosis Neg Hx       Current meds:   Current Outpatient Medications:     apixaban (ELIQUIS) 5 mg Tab, Take 1 tablet (5 mg total) by mouth 2 (two) times daily., Disp: 60 tablet, Rfl: 11    blood-glucose sensor (DEXCOM G6 SENSOR) Sandi, Inject 1 each into the skin every 10 days., Disp: 3 each, Rfl: 11    blood-glucose transmitter (DEXCOM G6 TRANSMITTER) Sandi, Inject 1 each into the skin every 10 days., Disp: 1  each, Rfl: 3    bumetanide (BUMEX) 1 MG tablet, TAKE 1 TABLET (1 MG TOTAL) BY MOUTH BEFORE BREAKFAST AND 1/2 TABLET (0.5 MG TOTAL) EVERY EVENING., Disp: 135 tablet, Rfl: 3    HUMALOG U-100 INSULIN 100 unit/mL injection, , Disp: , Rfl:     insulin glargine, TOUJEO, (TOUJEO SOLOSTAR U-300 INSULIN) 300 unit/mL (1.5 mL) InPn pen, Inject 30 Units into the skin once daily. Can increase dose by 2 units higher to get to goal fasting glucose 100 - 150 - for max TDD 60 units. Takes daily IN AN EMERGENCY only if OFF OF insulin pump., Disp: 15 mL, Rfl: 1    insulin lispro-aabc (LYUMJEV U-100 INSULIN) 100 unit/mL, INJECT 80 UNITS INTO THE SKIN CONTINUOUS. USES IN OMNIPOD INSULIN PUMP. USE AS DIRECTED. DO NOT DIRECTLY INJECT., Disp: 30 mL, Rfl: 6    insulin pump cart,automated,BT (OMNIPOD 5 G6 PODS, GEN 5,) Crtg, Inject 1 each into the skin every other day. Use with omnipod 5 pdm as directed., Disp: 15 each, Rfl: 11    rosuvastatin (CRESTOR) 5 MG tablet, TAKE 1 TABLET BY MOUTH EVERY DAY, Disp: 90 tablet, Rfl: 3    tamsulosin (FLOMAX) 0.4 mg Cap, Take 1 capsule (0.4 mg total) by mouth once daily., Disp: 30 capsule, Rfl: 1    aspirin (ECOTRIN) 81 MG EC tablet, Take 1 tablet (81 mg total) by mouth once daily., Disp: 9 tablet, Rfl: 0    doxycycline (VIBRA-TABS) 100 MG tablet, Take 1 tablet (100 mg total) by mouth 2 (two) times daily., Disp: 20 tablet, Rfl: 0    glucagon, human recombinant, (GLUCAGON EMERGENCY KIT, HUMAN,) 1 mg SolR, Inject 1 mg into the muscle as needed (For severely low sugar). (Patient not taking: Reported on 6/29/2022), Disp: 1 each, Rfl: 2     Current Diabetes medications:   Omnipod 5 - rate is at 1.1 units/hour.   Doesn't count carbs - 1: 1 ratio.   lyumjev insulin -     Medications Tried and Failed:   Metformin  Hx pancreatitis - cannot use dpp4 or glp1's.   VGO 30 patch - (was on a vgo 20) - giving around 4 clicks with meals.   Humalog - then switched to lyumjev.     Social:   Lives at home with: wife  Life  "changes/stressors currently. Did mention his mother in law passed away recently of stroke. Helping clean out her house. Also suffered recent foot and left leg infection.   Diet: not following ADA diet   Meals: 3 per day and snacks.        Breakfast - waffle house special - eggs, biscuit, cheese, grits, hashbrowns.        Lunch - eats out/pastas/rice - red bean plates, etc.        Dinner - eats out every night - [employer]        Snacks 1 - 2 times per day.   Exercise: nothing formally - but works in yard/garage.    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     Glucose Monitoring:   Checking sugars 4x/day via Dexcom g6 - see scanned in .   Gets supplies via mail - has [employer]. Now has changed to [employer].     Standards of care:   Eyes: .: 06/10/2021  Foot exam: : 09/21/2021   Diabetes education: yes - and for vgo start and omnipod dash start.     Vital Signs  /78 (BP Location: Right arm, Patient Position: Sitting, BP Method: Large (Manual))   Pulse 78   Temp 98.1 °F (36.7 °C) (Temporal)   Resp 16   Ht 5' 7" (1.702 m)   Wt 120.9 kg (266 lb 8.6 oz)   SpO2 98%   BMI 41.75 kg/m²     Pertinent Labs:   Hgba1c   Lab Results   Component Value Date    HGBA1C 8.1 (H) 04/06/2023    HGBA1C 8.9 (H) 03/08/2023    HGBA1C 8.6 (H) 02/17/2023     Lipid panel   Lab Results   Component Value Date    CHOL 152 04/06/2023    CHOL 155 03/15/2022    CHOL 137 12/16/2021     Lab Results   Component Value Date    HDL 62 04/06/2023    HDL 65 03/15/2022    HDL 55 12/16/2021     Lab Results   Component Value Date    LDLCALC 79.4 04/06/2023    LDLCALC 77.2 03/15/2022    LDLCALC 70.8 12/16/2021     Lab Results   Component Value Date    TRIG 53 04/06/2023    TRIG 64 03/15/2022    TRIG 56 12/16/2021     Lab Results   Component Value Date    CHOLHDL 40.8 04/06/2023    CHOLHDL 41.9 03/15/2022    CHOLHDL 40.1 12/16/2021      CMP  Glucose   Date Value Ref Range Status   04/06/2023 129 (H) 70 - 110 mg/dL " Final     BUN   Date Value Ref Range Status   04/06/2023 24 (H) 8 - 23 mg/dL Final     Creatinine   Date Value Ref Range Status   04/06/2023 2.2 (H) 0.5 - 1.4 mg/dL Final     eGFR if    Date Value Ref Range Status   06/22/2022 44.0 (A) >60 mL/min/1.73 m^2 Final     eGFR if non    Date Value Ref Range Status   06/22/2022 38.1 (A) >60 mL/min/1.73 m^2 Final     Comment:     Calculation used to obtain the estimated glomerular filtration  rate (eGFR) is the CKD-EPI equation.        AST   Date Value Ref Range Status   04/06/2023 34 10 - 40 U/L Final     ALT   Date Value Ref Range Status   04/06/2023 34 10 - 44 U/L Final     Microalbumin creatinine ratio:   Lab Results   Component Value Date    MICALBCREAT 3749.6 (H) 03/08/2023       Review Of Systems:   Gen: Appetite good, no weight gain or loss, + fatigue, Denies polydipsia.  Skin: Skin is intact and heals well, denies any rashes or hair changes.   EENT: Denies any acute visual disturbances, nor blurred vision.   Resp: Denies SOB or Dyspnea on exertion, denies cough.   Cardiac: Denies chest pain, palpitations, + 2 edema to bilat. Lower ext. Recent cellulitis infection. Now weeping. - worse on the left leg.   GI: Denies abdominal pain, nausea or vomiting, diarrhea, or constipation.   /GYN: Denies nocturia, nor burning, frequency or pain on urination. + c/o ED still.   MS/Neuro: Denies numbness/ tingling in BLE; Gait unsteady, speech clear  Psych: Denies drug/ETOH abuse, no hx of depression.  Other systems: negative.    Physical Exam:   GENERAL: Well developed, well nourished in appearance.   PSYCH: AAOx3, appropriate mood and affect, pleasant expression, conversant, appears relaxed, well groomed.   EYES: PERRL, Conjunctiva and corneas clear  NECK: Soft and Supple, trachea midline  CHEST: Even, regular, and unlabored respirations  ABDOMEN: Soft, non-tender, non-distended.   VASCULAR: pedal pulses palpable bilaterally,+ edema dependent  bilaterally. Redness worse and pitting to left leg -   See photo below. Blanchable - oozing/wheeping on left leg especially.   NEURO:  cranial nerves II - XII intact   MUSCULOSKELETAL: Good ROM, steady gait.   SKIN: Skin warm, dry, and intact - except for left leg - wheeping from swelling -                   Assessment and Plan of Care:     Jian was seen today for follow-up.    Diagnoses and all orders for this visit:    Cellulitis and abscess of left leg  -     doxycycline (VIBRA-TABS) 100 MG tablet; Take 1 tablet (100 mg total) by mouth 2 (two) times daily.    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 T1 - ELOISE - formerly treated as T2DM with hyperglycemia- Hgba1c goal is 7.5% or less without hypoglycemia - 7.6%---> 8.6%--> 8.3%--> 8.6% --> 8.1%  --> 11. 5% --> 8.3%--->8.9%--> 8.1%  currently.    discussed DM, progression of disease, long term complications, CV risk factors and tx options.   Advise compliance with ADA diet and encourage exercise- encourage proper food intake/but he likes to eat out a lot.   Continue on omnipod 5- lyumjev.   Automated mode. Try to bolus/not miss and Stay in automoated mode.   No change in rates.   Decreased bg target from 120 to 110. To help get him closer to target range.   Decreased active insulin time  from 4 hours to 3 hours.   Currently staying in automated mode - needs to bolus more. Enouraged for all meals and snacks.   RX sent for toujeo =- 30 untis once daily as back up insulin pen for emergency use.   Had prescribed farxiga - but he never started - will leave off for now. This could be a risk for him -   His renal function is too fluid/unpredictable.   Discussed MOA, possible side effects including yeast infection, UTI, dehydration and ketoacidosis, importance of maintaining hydration and avoiding No carb diets. Good use hygiene. Notify my office if any side effects. Will monitor renal function closely. Stable at present.   Reviewed  hypoglycemia, s/s and appropriate tx. Have/get quick acting glucose tablets at hand.   Instructed to monitor Blood glucose 2 - 4x/day and bring meter/ log to every clinic visit.   Continue on dexcom G6 personal cgm. Gets from pharmacy.   A CGM is MEDICALLY NECESSARY as it will allow me to virtually and more closely monitor glucose readings  This enables me to make any necessary adjustments to the patients diabetes regimen while keeping the patient and staff safe during the COVID-19 PHE.    2. HTN & Heart failure - usually controlled, did not take meds yet today -    continue meds as previously prescribed and monitor.   + urine mac  History of afib.   + history of CAD and hx cabg, as well as venous insufficiency bilat. Lower extremeties.   Last saw cardiology - Dr. Saavedra on 6/14/22.      3. Lipids- LDL goal < 100. At goal.   Currently on statin therapy    4. Weight - BMI Body mass index is 41.75 kg/m².   Encourage Ada diet and exercise as tolerated.   Former weight loss surgery.   Cannot do glp1 b/c of history of pancreatitis. I will consider it though in future considering his high risk profile.     5. Renal Function - ckd - stage 3 -   + microalbuminuria. No changes.     6,. Recent left leg and foot infection -   Suspect recurrent cellulitis -   Needs to be treated -    healing/likely r/t hyperglycemia.    hx venous insufficiency and lymphedema.   Referral placed for woundcare clinic placed today - to re-evaluation.   Started him on doxy bid x 10 days-   Advised if worse - or fever over next 24 - 48 hours, go to ER     7 hx fatty liver disease, cirrhosis, and resultatnt - pancreatitis - has followed with liver -   Right now stable trends -       Labs and OV in 6 weeks as scheduled.

## 2023-06-07 NOTE — PROGRESS NOTES
Diabetes Care Specialist Progress Note  Author: Lori Rush RD, CDE  Date: 6/7/2023    Program Intake  Reason for Diabetes Program Visit:: Intervention  Type of Intervention:: Individual  Education: Pattern Management    Lab Results   Component Value Date    HGBA1C 8.1 (H) 04/06/2023     Patient has been on Omnipod 5 for a while now. Continues a lot of the same habits. He's afraid of going low so he often skips boluses or will wait to bolus after he eats. He gets kicked out of automated mode a good bit as a result and forgets to switch back after 5 minutes. He is in automated mode about 74% of the time. He was still in manual mode when he came in this morning from yesterday. He was started on 6, 8, 10 units based on size of meal. At his last visit, tried to have him do just 6 for small and 8 for large to try to get him bolusing more. Today, will try 4 units for small and 6 units for large meal. Advised to bolus before meals and to make sure he is selecting use CGM when he eats so that the system can calculate a correction if needed and reviewed giving correction only boluses from time to time. He was scheduled to see NP later today for leg wound. Discussed getting Omnipod overpatches from either [employer] or [employer] - pods sometimes comes off. Reminded to get pod refills from [facility]. TIR 47%, High 31%, Very High 21%, <1% Low - anticipate higher TIR if patient would bolus more and stay in automated more.    Assessment Summary and Plan    Based on today's diabetes care assessment, the following areas of need were identified:      Social 3/29/2023   Support No   Access to Mass Media/Tech No   Cognitive/Behavioral Health No   Culture/Mormon No   Communication No   Health Literacy No        Clinical 3/29/2023   Medication Adherence No   Lab Compliance -   Nutritional Status -        Diabetes Self-Management Skills 3/29/2023   Diabetes Disease Process/Treatment Options No   Nutrition/Healthy Eating No   Physical  Activity/Exercise No   Medication Yes   Home Blood Glucose Monitoring No   Acute Complications No   Chronic Complications No   Psychosocial/Coping No          Today's interventions were provided through individual discussion, instruction, and written materials were provided.      Patient verbalized understanding of instruction and written materials.  Pt was able to return back demonstration of instructions today. Patient understood key points, needs reinforcement and further instruction.     Diabetes Self-Management Care Plan:    Today's Diabetes Self-Management Care Plan was developed with Jian's input. Jian has agreed to work toward the following goal(s) to improve his/her overall diabetes control.      There are no recently modified care plans to display for this patient.      Follow Up Plan     Follow up in about 5 weeks (around 7/13/2023) for Provider Visit.    Today's care plan and follow up schedule was discussed with patient.  Jian verbalized understanding of the care plan, goals, and agrees to follow up plan.        The patient was encouraged to communicate with his/her health care provider/physician and care team regarding his/her condition(s) and treatment.  I provided the patient with my contact information today and encouraged to contact me via phone or [facility] Patient Portal as needed.     Length of Visit   Total Time: 60 Minutes

## 2023-06-12 ENCOUNTER — PATIENT MESSAGE (OUTPATIENT)
Dept: INTERNAL MEDICINE | Facility: CLINIC | Age: 68
End: 2023-06-12
Payer: MEDICARE

## 2023-06-12 DIAGNOSIS — E11.40 TYPE 2 DIABETES MELLITUS WITH DIABETIC NEUROPATHY, WITH LONG-TERM CURRENT USE OF INSULIN: Primary | Chronic | ICD-10-CM

## 2023-06-12 DIAGNOSIS — Z79.4 TYPE 2 DIABETES MELLITUS WITH DIABETIC NEUROPATHY, WITH LONG-TERM CURRENT USE OF INSULIN: Primary | Chronic | ICD-10-CM

## 2023-06-12 RX ORDER — PEN NEEDLE, DIABETIC 30 GX3/16"
NEEDLE, DISPOSABLE MISCELLANEOUS
Qty: 30 EACH | Refills: 3 | Status: SHIPPED | OUTPATIENT
Start: 2023-06-12

## 2023-06-12 RX ORDER — INSULIN LISPRO-AABC 100 [IU]/ML
12 INJECTION, SOLUTION INTRAVENOUS; SUBCUTANEOUS
COMMUNITY
Start: 2022-06-29 | End: 2023-06-29

## 2023-06-12 NOTE — TELEPHONE ENCOUNTER
"Pt requesting " Can you call in pen needles for his rx Billie Farias InPn."    Pt requesting to know " Is toujeo.  lyumjev the same, compatible"      "

## 2023-06-12 NOTE — CONSULTS
"20g 1 3/4" PIV placed to RFA using ultrasound guidance.   " Spoke to daughter pharmacy will call today should be ready. 06/12/2023

## 2023-06-20 ENCOUNTER — HOSPITAL ENCOUNTER (OUTPATIENT)
Dept: WOUND CARE | Facility: HOSPITAL | Age: 68
Discharge: HOME | End: 2023-06-20
Attending: STUDENT IN AN ORGANIZED HEALTH CARE EDUCATION/TRAINING PROGRAM
Payer: MEDICARE

## 2023-06-20 VITALS
DIASTOLIC BLOOD PRESSURE: 68 MMHG | WEIGHT: 266 LBS | BODY MASS INDEX: 41.75 KG/M2 | SYSTOLIC BLOOD PRESSURE: 157 MMHG | HEIGHT: 67 IN | HEART RATE: 79 BPM

## 2023-06-20 DIAGNOSIS — L97.909 VENOUS ULCER: Primary | ICD-10-CM

## 2023-06-20 DIAGNOSIS — I83.009 VENOUS ULCER: Primary | ICD-10-CM

## 2023-06-20 DIAGNOSIS — L03.119 CELLULITIS OF LOWER EXTREMITY, UNSPECIFIED LATERALITY: ICD-10-CM

## 2023-06-20 DIAGNOSIS — I89.0 LYMPHEDEMA OF BOTH LOWER EXTREMITIES: Chronic | ICD-10-CM

## 2023-06-20 PROCEDURE — 87077 CULTURE AEROBIC IDENTIFY: CPT | Performed by: STUDENT IN AN ORGANIZED HEALTH CARE EDUCATION/TRAINING PROGRAM

## 2023-06-20 PROCEDURE — 29581 APPL MULTLAYER CMPRN SYS LEG: CPT

## 2023-06-20 PROCEDURE — 99213 OFFICE O/P EST LOW 20 MIN: CPT | Mod: 25

## 2023-06-20 PROCEDURE — 87075 CULTR BACTERIA EXCEPT BLOOD: CPT | Performed by: STUDENT IN AN ORGANIZED HEALTH CARE EDUCATION/TRAINING PROGRAM

## 2023-06-20 PROCEDURE — 87070 CULTURE OTHR SPECIMN AEROBIC: CPT | Performed by: STUDENT IN AN ORGANIZED HEALTH CARE EDUCATION/TRAINING PROGRAM

## 2023-06-20 PROCEDURE — 99214 OFFICE O/P EST MOD 30 MIN: CPT | Mod: ,,, | Performed by: STUDENT IN AN ORGANIZED HEALTH CARE EDUCATION/TRAINING PROGRAM

## 2023-06-20 PROCEDURE — 11042 DBRDMT SUBQ TIS 1ST 20SQCM/<: CPT

## 2023-06-20 PROCEDURE — 87186 SC STD MICRODIL/AGAR DIL: CPT | Performed by: STUDENT IN AN ORGANIZED HEALTH CARE EDUCATION/TRAINING PROGRAM

## 2023-06-20 PROCEDURE — 99214 PR OFFICE/OUTPT VISIT, EST, LEVL IV, 30-39 MIN: ICD-10-PCS | Mod: ,,, | Performed by: STUDENT IN AN ORGANIZED HEALTH CARE EDUCATION/TRAINING PROGRAM

## 2023-06-20 NOTE — PROGRESS NOTES
Wound Care & Hyperbaric Medicine Clinic    Subjective:       Patient ID: Jian Arrieta is a 67 y.o. male.    Chief Complaint: Non-healing Wound    Readmit to wound care with ble cellulitis and lymphedema.  Reports cellulitis present over 2 months. Went to PCP 6/7/23 prescribed Doxycyline PO x 10 days states he finished today and rle has improved.  Left leg site debrided and culture taken, consult placed to lymphedema clinic, plan of care initiated tolerated well. Will see ID next visit for antibiotic recommendations.   Review of Systems   Constitutional:  Negative for activity change, chills, diaphoresis and fever.   HENT:  Negative for congestion and hearing loss.    Respiratory:  Negative for cough and shortness of breath.    Cardiovascular:  Positive for leg swelling.   Gastrointestinal:  Negative for nausea and vomiting.   Skin:  Positive for wound. Negative for color change, pallor and rash.   Neurological:  Positive for numbness. Negative for tremors, speech difficulty and weakness.   Psychiatric/Behavioral:  Negative for agitation and confusion. The patient is not nervous/anxious.            Objective:     Vitals:    06/20/23 1419   BP: (!) 157/68   Pulse: 79     Hemoglobin A1C   Date Value Ref Range Status   04/06/2023 8.1 (H) 4.0 - 5.6 % Final     Comment:     ADA Screening Guidelines:  5.7-6.4%  Consistent with prediabetes  >or=6.5%  Consistent with diabetes    High levels of fetal hemoglobin interfere with the HbA1C  assay. Heterozygous hemoglobin variants (HbS, HgC, etc)do  not significantly interfere with this assay.   However, presence of multiple variants may affect accuracy.     03/08/2023 8.9 (H) 4.0 - 5.6 % Final     Comment:     ADA Screening Guidelines:  5.7-6.4%  Consistent with prediabetes  >or=6.5%  Consistent with diabetes    High levels of fetal hemoglobin interfere with the HbA1C  assay. Heterozygous hemoglobin variants (HbS, HgC, etc)do  not significantly interfere  with this assay.   However, presence of multiple variants may affect accuracy.     02/17/2023 8.6 (H) 4.5 - 5.7 % Final   09/09/2022 8.3 (H) 4.0 - 5.6 % Final     Comment:     ADA Screening Guidelines:  5.7-6.4%  Consistent with prediabetes  >or=6.5%  Consistent with diabetes    High levels of fetal hemoglobin interfere with the HbA1C  assay. Heterozygous hemoglobin variants (HbS, HgC, etc)do  not significantly interfere with this assay.   However, presence of multiple variants may affect accuracy.            Physical Exam  Constitutional:       General: He is not in acute distress.     Appearance: Normal appearance. He is well-developed. He is not diaphoretic.   Cardiovascular:      Comments: Dorsalis pedis and posterior tibial pulses are mildly diminished. Skin temperature is within normal limits. Toes are cool to touch and feet are warm proximally. Hair growth is diminished. Skin is atrophic and with mild hyperpigmentation. Pitting edema noted, bilaterally.   Musculoskeletal:         General: No tenderness.      Comments: Adequate joint range of motion without pain, limitation, nor crepitation to foot and ankle joints. Muscle strength is 5/5 in all groups bilaterally.       Feet:      Right foot:      Skin integrity: Dry skin present. No ulcer, blister, erythema or warmth.      Left foot:      Skin integrity: Dry skin present. No ulcer, blister, erythema or warmth.   Skin:     General: Skin is warm and dry.      Capillary Refill: Capillary refill takes less than 2 seconds.      Comments: Skin is warm and dry, no acute signs of infection noted bilaterally      See wound description below    Toenails are thickened by 2-4 mm's, dystrophic, and are darkened in coloration with subungual fungal debris.     Otherwise, no open wounds, macerations or hyperkeratotic lesions, bilaterally            Neurological:      Mental Status: He is alert and oriented to person, place, and time.      Sensory: Sensory deficit present.       Motor: No abnormal muscle tone.      Comments: Light touch within normal limits.    Psychiatric:         Mood and Affect: Mood normal.         Behavior: Behavior normal.         Thought Content: Thought content normal.          Altered Skin Integrity 06/20/23 1415 Right anterior;lower Leg (Active)   06/20/23 1415   Altered Skin Integrity Present on Admission - Did Patient arrive to the hospital with altered skin?:    Side: Right   Orientation: anterior;lower   Location: Leg   Wound Number:    Is this injury device related?:    Primary Wound Type:    Description of Altered Skin Integrity:    Ankle-Brachial Index:    Pulses:    Removal Indication and Assessment:    Wound Outcome:    (Retired) Wound Length (cm):    (Retired) Wound Width (cm):    (Retired) Depth (cm):    Wound Description (Comments):    Removal Indications:    Wound Image   06/20/23 1426   Dressing Appearance Dry;Open to air 06/20/23 1426   Appearance Red;Dry;Closed/resurfaced 06/20/23 1426   Red (%), Wound Tissue Color 100 % 06/20/23 1426   Wound Edges Rolled/closed 06/20/23 1426   Wound Length (cm) 0 cm 06/20/23 1426   Wound Width (cm) 0 cm 06/20/23 1426   Wound Depth (cm) 0 cm 06/20/23 1426   Wound Volume (cm^3) 0 cm^3 06/20/23 1426   Wound Surface Area (cm^2) 0 cm^2 06/20/23 1426   Care Cleansed with:;Soap and water 06/20/23 1426   Dressing Applied;Compression wrap 06/20/23 1426   Periwound Care Dry periwound area maintained;Topical treatment applied 06/20/23 1426   Compression Two layer compression 06/20/23 1426   Dressing Change Due 06/27/23 06/20/23 1426            Altered Skin Integrity 06/20/23 1415 Left medial;posterior Leg Blister(s) (Active)   06/20/23 1415   Altered Skin Integrity Present on Admission - Did Patient arrive to the hospital with altered skin?:    Side: Left   Orientation: medial;posterior   Location: Leg   Wound Number:    Is this injury device related?:    Primary Wound Type: Blister(s)   Description of Altered Skin  Integrity:    Ankle-Brachial Index:    Pulses:    Removal Indication and Assessment:    Wound Outcome:    (Retired) Wound Length (cm):    (Retired) Wound Width (cm):    (Retired) Depth (cm):    Wound Description (Comments):    Removal Indications:    Wound Image    06/20/23 1426   Description of Altered Skin Integrity Partial thickness tissue loss. Shallow open ulcer with a red or pink wound bed, without slough. Intact or Open/Ruptured Serum-filled blister. 06/20/23 1426   Dressing Appearance Dry;Moist drainage 06/20/23 1426   Drainage Amount Scant 06/20/23 1426   Drainage Characteristics/Odor Serous 06/20/23 1426   Appearance Red;Moist 06/20/23 1426   Tissue loss description Partial thickness 06/20/23 1426   Red (%), Wound Tissue Color 100 % 06/20/23 1426   Periwound Area Intact;Edematous;Redness 06/20/23 1426   Wound Edges Irregular 06/20/23 1426   Wound Length (cm) 6.5 cm 06/20/23 1426   Wound Width (cm) 1.6 cm 06/20/23 1426   Wound Depth (cm) 0.1 cm 06/20/23 1426   Wound Volume (cm^3) 1.04 cm^3 06/20/23 1426   Wound Surface Area (cm^2) 10.4 cm^2 06/20/23 1426   Care Soap and water;Sterile normal saline 06/20/23 1426   Dressing Applied;Silver;Calcium alginate;Compression wrap 06/20/23 1426   Periwound Care Absorptive dressing applied;Dry periwound area maintained 06/20/23 1426   Compression Two layer compression 06/20/23 1426   Dressing Change Due 06/27/23 06/20/23 1426         Assessment/Plan:         ICD-10-CM ICD-9-CM   1. Venous ulcer  I83.009 707.9    L97.909    2. Lymphedema of both lower extremities  I89.0 457.1   3. Cellulitis of lower extremity, unspecified laterality  L03.119 682.6           Tissue pathology and/or culture taken:  [x] Yes [] No   Sharp debridement performed:   [] Yes [x] No   Labs ordered this visit:   [] Yes [x] No   Imaging ordered this visit:   [] Yes [x] No           Orders Placed This Encounter   Procedures    Anaerobic culture          CULTURE, AEROBIC  (SPECIFY SOURCE)           Ambulatory referral/consult to Wound Clinic     Standing Status:   Standing     Number of Occurrences:   1     Referral Priority:   Routine     Referral Type:   Consultation     Referral Reason:   Specialty Services Required     Requested Specialty:   Wound Care     Number of Visits Requested:   1    Ambulatory referral/consult to Physical/Occupational Therapy     Standing Status:   Future     Standing Expiration Date:   7/20/2024     Referral Priority:   Routine     Referral Type:   Physical Medicine     Referral Reason:   Specialty Services Required     Requested Specialty:   Physical Therapy     Number of Visits Requested:   1    Change dressing     Bilateral Lower Extremities  Cleanse wound with:Normal Saline  Lidocaine:PRN  Silver nitrate:PRN  Destiny Wound: Sween and Betamethasone BLE   Primary dressing:Aquacel AG to any open area  Secondary dressing:Urgo K2 BLE toes to knee  Offloading:Elevate BLE as much as possible  Edema control:Urgo K2 BLE   Frequency: Weekly in clinic  Follow-up:1 week  and ID for antibiotic recommendations    Other Orders: Wound Culture taken and consult to Lymphedema clinic placed 6/20/23.        -Wound care per above. Discussed importance of keeping dressings clean, dry and intact for wound healing.   -Culture taken today, plan for follow up with ID  -Rest, elevate, avoid salt intake. Continue follow up with PCP and Cardiology for leg swelling. Referral to Lymphedema clinic placed  -Shoe inspection. Diabetic Foot Education. Patient reminded of the importance of good nutrition and blood sugar control to help prevent podiatric complications of diabetes. Inspect feet daily for skin breaks, blisters, swelling, or redness. Wear cotton socks (preferably white)  Change socks every day. Do NOT walk barefoot. Do NOT use heating pads or warm/hot water soaks. I discussed with the  patient  signs and symptoms of infection including redness, drainage, purulence, odor, pain, elevated BS,  streaking, fever, chills, etc . Patient is to seek medical attention (ER or urgent care) if these symptoms occur    Follow up in about 1 week (around 6/27/2023) for

## 2023-06-24 LAB — BACTERIA SPEC AEROBE CULT: ABNORMAL

## 2023-06-26 LAB — BACTERIA SPEC ANAEROBE CULT: NORMAL

## 2023-06-26 RX ORDER — AMOXICILLIN AND CLAVULANATE POTASSIUM 875; 125 MG/1; MG/1
1 TABLET, FILM COATED ORAL EVERY 12 HOURS
Qty: 20 TABLET | Refills: 0 | Status: SHIPPED | OUTPATIENT
Start: 2023-06-26 | End: 2023-07-06

## 2023-06-27 ENCOUNTER — CLINICAL SUPPORT (OUTPATIENT)
Dept: INFECTIOUS DISEASES | Facility: CLINIC | Age: 68
End: 2023-06-27
Payer: MEDICARE

## 2023-06-27 ENCOUNTER — HOSPITAL ENCOUNTER (OUTPATIENT)
Dept: WOUND CARE | Facility: HOSPITAL | Age: 68
Discharge: HOME | End: 2023-06-27
Attending: STUDENT IN AN ORGANIZED HEALTH CARE EDUCATION/TRAINING PROGRAM
Payer: MEDICARE

## 2023-06-27 VITALS
SYSTOLIC BLOOD PRESSURE: 167 MMHG | HEIGHT: 67 IN | BODY MASS INDEX: 41.75 KG/M2 | DIASTOLIC BLOOD PRESSURE: 82 MMHG | HEART RATE: 44 BPM | WEIGHT: 266 LBS | TEMPERATURE: 96 F

## 2023-06-27 DIAGNOSIS — I87.2 VENOUS INSUFFICIENCY OF BOTH LOWER EXTREMITIES: ICD-10-CM

## 2023-06-27 DIAGNOSIS — G47.30 SLEEP APNEA, UNSPECIFIED TYPE: ICD-10-CM

## 2023-06-27 DIAGNOSIS — Z87.828 HEALED WOUND: ICD-10-CM

## 2023-06-27 DIAGNOSIS — L08.9 LOCAL INFECTION OF WOUND: ICD-10-CM

## 2023-06-27 DIAGNOSIS — I81 PORTAL HYPERTENSION DUE TO OBSTRUCTION OF EXTRAHEPATIC PORTAL VEIN: Chronic | ICD-10-CM

## 2023-06-27 DIAGNOSIS — I89.0 LYMPHEDEMA OF BOTH LOWER EXTREMITIES: Chronic | ICD-10-CM

## 2023-06-27 DIAGNOSIS — I89.0 LYMPHEDEMA OF BOTH LOWER EXTREMITIES: Primary | Chronic | ICD-10-CM

## 2023-06-27 DIAGNOSIS — T14.8XXA LOCAL INFECTION OF WOUND: ICD-10-CM

## 2023-06-27 DIAGNOSIS — E11.40 TYPE 2 DIABETES MELLITUS WITH DIABETIC NEUROPATHY, WITH LONG-TERM CURRENT USE OF INSULIN: Chronic | ICD-10-CM

## 2023-06-27 DIAGNOSIS — K76.6 PORTAL HYPERTENSION DUE TO OBSTRUCTION OF EXTRAHEPATIC PORTAL VEIN: Chronic | ICD-10-CM

## 2023-06-27 DIAGNOSIS — Z79.4 TYPE 2 DIABETES MELLITUS WITH DIABETIC NEUROPATHY, WITH LONG-TERM CURRENT USE OF INSULIN: Chronic | ICD-10-CM

## 2023-06-27 DIAGNOSIS — A49.01 MSSA INFECTION, NON-INVASIVE: ICD-10-CM

## 2023-06-27 DIAGNOSIS — I87.2 VENOUS INSUFFICIENCY OF BOTH LOWER EXTREMITIES: Primary | ICD-10-CM

## 2023-06-27 PROCEDURE — 99213 OFFICE O/P EST LOW 20 MIN: CPT

## 2023-06-27 PROCEDURE — 99213 PR OFFICE/OUTPT VISIT, EST, LEVL III, 20-29 MIN: ICD-10-PCS | Mod: S$GLB,,, | Performed by: INTERNAL MEDICINE

## 2023-06-27 PROCEDURE — 99213 OFFICE O/P EST LOW 20 MIN: CPT | Mod: ,,, | Performed by: STUDENT IN AN ORGANIZED HEALTH CARE EDUCATION/TRAINING PROGRAM

## 2023-06-27 PROCEDURE — 99213 OFFICE O/P EST LOW 20 MIN: CPT | Mod: S$GLB,,, | Performed by: INTERNAL MEDICINE

## 2023-06-27 PROCEDURE — 99213 PR OFFICE/OUTPT VISIT, EST, LEVL III, 20-29 MIN: ICD-10-PCS | Mod: ,,, | Performed by: STUDENT IN AN ORGANIZED HEALTH CARE EDUCATION/TRAINING PROGRAM

## 2023-06-27 NOTE — PROGRESS NOTES
Wound Care & Hyperbaric Medicine Clinic    Subjective:       Patient ID: Jian Arrieta is a 67 y.o. male.    Chief Complaint: No chief complaint on file.    Follow up ble edema with open wound lle.  Wound healed, edema ble improved with compression. Seen by Id recommends taking Augmentin PO Rx until finished. Transitioned to Tubi  E x 2 ble daily with moisturizer ble, has appointment with lymphedema clinic pending. Discharged for wound care follow up in podiatry clinic in 4 weeks. Verbalized understanding of all instructions.   Review of Systems   Constitutional:  Negative for activity change, chills, diaphoresis and fever.   HENT:  Negative for congestion and hearing loss.    Respiratory:  Negative for cough and shortness of breath.    Cardiovascular:  Positive for leg swelling.   Gastrointestinal:  Negative for nausea and vomiting.   Skin:  Positive for wound. Negative for color change, pallor and rash.   Neurological:  Positive for numbness. Negative for tremors, speech difficulty and weakness.   Psychiatric/Behavioral:  Negative for agitation and confusion. The patient is not nervous/anxious.        Objective:     Vitals:    06/27/23 1505   BP: (!) 167/82   Pulse: (!) 44   Temp: 96 °F (35.6 °C)         Physical Exam  Constitutional:       General: He is not in acute distress.     Appearance: Normal appearance. He is well-developed. He is not diaphoretic.   Cardiovascular:      Comments: Dorsalis pedis and posterior tibial pulses are mildly diminished. Skin temperature is within normal limits. Toes are cool to touch and feet are warm proximally. Hair growth is diminished. Skin is atrophic and with mild hyperpigmentation. Pitting edema noted, bilaterally.   Musculoskeletal:         General: No tenderness.      Comments: Adequate joint range of motion without pain, limitation, nor crepitation to foot and ankle joints. Muscle strength is 5/5 in all groups bilaterally.       Feet:      Right  foot:      Skin integrity: Dry skin present. No ulcer, blister, erythema or warmth.      Left foot:      Skin integrity: Dry skin present. No ulcer, blister, erythema or warmth.   Skin:     General: Skin is warm and dry.      Capillary Refill: Capillary refill takes less than 2 seconds.      Comments: Skin is warm and dry, no acute signs of infection noted bilaterally      See wound description below    Toenails are thickened by 2-4 mm's, dystrophic, and are darkened in coloration with subungual fungal debris.     Otherwise, no open wounds, macerations or hyperkeratotic lesions, bilaterally            Neurological:      Mental Status: He is alert and oriented to person, place, and time.      Sensory: Sensory deficit present.      Motor: No abnormal muscle tone.      Comments: Light touch within normal limits.    Psychiatric:         Mood and Affect: Mood normal.         Behavior: Behavior normal.         Thought Content: Thought content normal.     [REMOVED]      Altered Skin Integrity 06/20/23 1415 Right anterior;lower Leg (Removed)   06/20/23 1415   Altered Skin Integrity Present on Admission - Did Patient arrive to the hospital with altered skin?:    Side: Right   Orientation: anterior;lower   Location: Leg   Wound Number:    Is this injury device related?:    Primary Wound Type:    Description of Altered Skin Integrity:    Ankle-Brachial Index:    Pulses:    Removal Indication and Assessment:    Wound Outcome: Healed   (Retired) Wound Length (cm):    (Retired) Wound Width (cm):    (Retired) Depth (cm):    Wound Description (Comments):    Removal Indications:    Removed 06/27/23 1555   Wound Image   06/27/23 1554   Dressing Appearance Dry;Intact 06/27/23 1554   Appearance Closed/resurfaced 06/27/23 1554   Red (%), Wound Tissue Color 100 % 06/27/23 1554   Wound Edges Rolled/closed 06/27/23 1554   Wound Length (cm) 0 cm 06/27/23 1554   Wound Width (cm) 0 cm 06/27/23 1554   Wound Depth (cm) 0 cm 06/27/23 1554    Wound Volume (cm^3) 0 cm^3 06/27/23 1554   Wound Surface Area (cm^2) 0 cm^2 06/27/23 1554   Care Cleansed with:;Soap and water 06/27/23 1554   Dressing Applied;Tubular bandage 06/27/23 1554   Periwound Care Moisturizer applied;Dry periwound area maintained 06/27/23 1554   Compression Tubular elasticized bandage 06/27/23 1554   Dressing Change Due 06/28/23 06/27/23 1554       [REMOVED]      Altered Skin Integrity 06/20/23 1415 Left medial;posterior Leg Blister(s) (Removed)   06/20/23 1415   Altered Skin Integrity Present on Admission - Did Patient arrive to the hospital with altered skin?:    Side: Left   Orientation: medial;posterior   Location: Leg   Wound Number:    Is this injury device related?:    Primary Wound Type: Blister(s)   Description of Altered Skin Integrity:    Ankle-Brachial Index:    Pulses:    Removal Indication and Assessment:    Wound Outcome: Healed   (Retired) Wound Length (cm):    (Retired) Wound Width (cm):    (Retired) Depth (cm):    Wound Description (Comments):    Removal Indications:    Removed 06/27/23 1557   Wound Image    06/27/23 1554   Dressing Appearance Intact;Dry 06/27/23 1554   Appearance Pink;Dry;Closed/resurfaced 06/27/23 1554   Red (%), Wound Tissue Color 100 % 06/27/23 1554   Periwound Area Intact;Dry;Edematous 06/27/23 1554   Wound Edges Rolled/closed 06/27/23 1554   Wound Length (cm) 0 cm 06/27/23 1554   Wound Width (cm) 0 cm 06/27/23 1554   Wound Depth (cm) 0 cm 06/27/23 1554   Wound Volume (cm^3) 0 cm^3 06/27/23 1554   Wound Surface Area (cm^2) 0 cm^2 06/27/23 1554   Care Cleansed with:;Soap and water 06/27/23 1554   Dressing Applied;Tubular bandage 06/27/23 1554   Periwound Care Moisturizer applied 06/27/23 1554   Compression Tubular elasticized bandage 06/27/23 1554   Dressing Change Due 06/28/23 06/27/23 1554         Assessment/Plan:         ICD-10-CM ICD-9-CM   1. Lymphedema of both lower extremities  I89.0 457.1   2. Venous insufficiency of both lower extremities   I87.2 459.81           Tissue pathology and/or culture taken:  [] Yes [x] No   Sharp debridement performed:   [] Yes [x] No   Labs ordered this visit:   [] Yes [x] No   Imaging ordered this visit:   [] Yes [x] No           Orders Placed This Encounter   Procedures    Change dressing     Bilateral Lower Extremities     Wash gently with soap and water pat dry, lotion ble Tubi  E x 2 daily.   Lymphedema clinic pending  Continue Augmentin Po as ordered until finished per ID.  Follow up 4 weeks Dr. Felton in Podiatry office. Healed Wound Instructions:Your wound is healed, it is extremely fragile at this stage; protect it from friction, wash it gently and pat dry.  If the wound should re-open, please call [phone] for further instructions.        -Noted ID recommendations  -Wounds are healed  -Rest, elevate, avoid salt intake. Continue follow up with PCP and Cardiology for leg swelling. Referral to Lymphedema clinic placed  -Shoe inspection. Diabetic Foot Education. Patient reminded of the importance of good nutrition and blood sugar control to help prevent podiatric complications of diabetes. Inspect feet daily for skin breaks, blisters, swelling, or redness. Wear cotton socks (preferably white)  Change socks every day. Do NOT walk barefoot. Do NOT use heating pads or warm/hot water soaks. I discussed with the  patient  signs and symptoms of infection including redness, drainage, purulence, odor, pain, elevated BS, streaking, fever, chills, etc . Patient is to seek medical attention (ER or urgent care) if these symptoms occur      Follow up in about 4 weeks (around 7/25/2023) for 's Office .

## 2023-06-27 NOTE — PROGRESS NOTES
"Follow up for cellulitis. History of lymphedema, portal hypertension, sleep apnea (untreated), and DM2.    Treated for lymphedema, severe leg edema, and possible cellulitis on 6/7/23 with doxycycline.   Denied fever or pain at that time - had blister on left leg, but no purulence  Note states:  "Recent left leg and foot infection -   Suspect recurrent cellulitis -   Needs to be treated -   healing/likely r/t hyperglycemia.    hx venous insufficiency and lymphedema.   Referral placed for woundcare clinic placed today - to re-evaluation.   Started him on doxy bid x 10 days-   Advised if worse - or fever over next 24 - 48 hours, go to ER"     Picture from 6/7:      Finished doxycycline. Culture on 6/20 grew MSSA, resistant to tetracyclines, sensitive to Bactrim and Oxacillin. Placed on Augmentin yesterday.    Denies fever. Edema improved with compression wraps.  Exam - chronic venous stasis changes present. No erythema, no drainage, no blister present. Wounds healed.                 1. Venous insufficiency of both lower extremities    2. MSSA infection, non-invasive    3. Type 2 diabetes mellitus with diabetic neuropathy, with long-term current use of insulin    4. Portal hypertension due to obstruction of extrahepatic portal vein    5. Local infection of wound    6. Lymphedema of both lower extremities    7. Sleep apnea, unspecified type      Discussed with podiatry at bedside. Cellulitis resolving. Patient wants to continue augmentin.  Continue edema control, diuresis.  Discussed portal hypertension and sleep apnea (pulmonary hypertension) - this will hinder edema control in the legs.    Referral to lymphedema clinic pending.  Follow up in 1 month.  I spent over 20 minutes on this encounter today.  "

## 2023-06-29 ENCOUNTER — PATIENT OUTREACH (OUTPATIENT)
Dept: ADMINISTRATIVE | Facility: HOSPITAL | Age: 68
End: 2023-06-29
Payer: MEDICARE

## 2023-06-29 NOTE — PROGRESS NOTES
Health Maintenance Due   Topic Date Due    Shingles Vaccine (1 of 2) Never done    Pneumococcal Vaccines (Age 65+) (2 - PCV) 10/26/2021    COVID-19 Vaccine (4 - Moderna series) 01/17/2022    Eye Exam  06/10/2022    Foot Exam  09/21/2022     Chart reviewed.   Immunizations: Reconciled  Orders placed: N/A  Upcoming appts to satisfy AMANDA topics: N/A

## 2023-07-06 ENCOUNTER — LAB VISIT (OUTPATIENT)
Dept: LAB | Facility: HOSPITAL | Age: 68
End: 2023-07-06
Payer: MEDICARE

## 2023-07-06 ENCOUNTER — TELEPHONE (OUTPATIENT)
Dept: INTERNAL MEDICINE | Facility: CLINIC | Age: 68
End: 2023-07-06
Payer: MEDICARE

## 2023-07-06 DIAGNOSIS — E13.9 LADA (LATENT AUTOIMMUNE DIABETES IN ADULTS), MANAGED AS TYPE 1: ICD-10-CM

## 2023-07-06 DIAGNOSIS — N18.30 STAGE 3 CHRONIC KIDNEY DISEASE, UNSPECIFIED WHETHER STAGE 3A OR 3B CKD: Primary | ICD-10-CM

## 2023-07-06 LAB
ALBUMIN SERPL BCP-MCNC: 2.7 G/DL (ref 3.5–5.2)
ALP SERPL-CCNC: 131 U/L (ref 55–135)
ALT SERPL W/O P-5'-P-CCNC: 58 U/L (ref 10–44)
ANION GAP SERPL CALC-SCNC: 8 MMOL/L (ref 8–16)
AST SERPL-CCNC: 81 U/L (ref 10–40)
BILIRUB SERPL-MCNC: 0.3 MG/DL (ref 0.1–1)
BUN SERPL-MCNC: 23 MG/DL (ref 8–23)
CALCIUM SERPL-MCNC: 8.2 MG/DL (ref 8.7–10.5)
CHLORIDE SERPL-SCNC: 106 MMOL/L (ref 95–110)
CO2 SERPL-SCNC: 25 MMOL/L (ref 23–29)
CREAT SERPL-MCNC: 2.4 MG/DL (ref 0.5–1.4)
EST. GFR  (NO RACE VARIABLE): 28.7 ML/MIN/1.73 M^2
ESTIMATED AVG GLUCOSE: 166 MG/DL (ref 68–131)
GLUCOSE SERPL-MCNC: 241 MG/DL (ref 70–110)
HBA1C MFR BLD: 7.4 % (ref 4–5.6)
POTASSIUM SERPL-SCNC: 5.3 MMOL/L (ref 3.5–5.1)
PROT SERPL-MCNC: 6.4 G/DL (ref 6–8.4)
SODIUM SERPL-SCNC: 139 MMOL/L (ref 136–145)

## 2023-07-06 PROCEDURE — 36415 COLL VENOUS BLD VENIPUNCTURE: CPT | Mod: PO | Performed by: NURSE PRACTITIONER

## 2023-07-06 PROCEDURE — 80053 COMPREHEN METABOLIC PANEL: CPT | Performed by: NURSE PRACTITIONER

## 2023-07-06 PROCEDURE — 83036 HEMOGLOBIN GLYCOSYLATED A1C: CPT | Performed by: NURSE PRACTITIONER

## 2023-07-06 NOTE — TELEPHONE ENCOUNTER
Labs showed worsening kidney function, higher potassium levels.   Want patient to ensure he is hydrated and feeling ok through the weekend.   Repeat a bmp (blood test) next Monday to recheck kidney function.     Placed consult for nephrology - kidney specialist to evaluate his renal function -   He just needs to schedule the visit.     Thanks,  Ivanna

## 2023-07-06 NOTE — TELEPHONE ENCOUNTER
lET PT KNOW THE PROVIDER WANTED HIM TO BE SEEN AT THE EMERGENCY ROOM BECAUSE HE IS SYMPTOMATIC OF KIDNEY FAILURE, PT DECLINED STATING HE WILL FEEL BETTER WHEN HE TAKES THE ANTIBIOTIC PRESCRIBED. STATES IF THINGS GETS WORSE HE WILL BE SEEN SOMETIME THROUGH THE WEEKEND.

## 2023-07-07 ENCOUNTER — HOSPITAL ENCOUNTER (EMERGENCY)
Facility: HOSPITAL | Age: 68
Discharge: HOME | End: 2023-07-07
Attending: EMERGENCY MEDICINE
Payer: MEDICARE

## 2023-07-07 VITALS
WEIGHT: 257.19 LBS | SYSTOLIC BLOOD PRESSURE: 153 MMHG | TEMPERATURE: 98 F | BODY MASS INDEX: 40.37 KG/M2 | RESPIRATION RATE: 18 BRPM | HEART RATE: 68 BPM | DIASTOLIC BLOOD PRESSURE: 76 MMHG | HEIGHT: 67 IN | OXYGEN SATURATION: 97 %

## 2023-07-07 DIAGNOSIS — N18.9 CHRONIC KIDNEY DISEASE, UNSPECIFIED CKD STAGE: ICD-10-CM

## 2023-07-07 DIAGNOSIS — U07.1 COVID-19: Primary | ICD-10-CM

## 2023-07-07 DIAGNOSIS — U07.1 COVID-19 VIRUS DETECTED: ICD-10-CM

## 2023-07-07 LAB
ALBUMIN SERPL BCP-MCNC: 2.5 G/DL (ref 3.5–5.2)
ALP SERPL-CCNC: 122 U/L (ref 55–135)
ALT SERPL W/O P-5'-P-CCNC: 47 U/L (ref 10–44)
ANION GAP SERPL CALC-SCNC: 9 MMOL/L (ref 8–16)
AST SERPL-CCNC: 58 U/L (ref 10–40)
BACTERIA #/AREA URNS HPF: ABNORMAL /HPF
BASOPHILS # BLD AUTO: 0.03 K/UL (ref 0–0.2)
BASOPHILS NFR BLD: 0.8 % (ref 0–1.9)
BILIRUB SERPL-MCNC: 0.2 MG/DL (ref 0.1–1)
BILIRUB UR QL STRIP: NEGATIVE
BUN SERPL-MCNC: 26 MG/DL (ref 8–23)
CALCIUM SERPL-MCNC: 8.1 MG/DL (ref 8.7–10.5)
CHLORIDE SERPL-SCNC: 108 MMOL/L (ref 95–110)
CLARITY UR: ABNORMAL
CO2 SERPL-SCNC: 22 MMOL/L (ref 23–29)
COLOR UR: YELLOW
CREAT SERPL-MCNC: 2.4 MG/DL (ref 0.5–1.4)
CTP QC/QA: YES
CTP QC/QA: YES
DIFFERENTIAL METHOD: ABNORMAL
EOSINOPHIL # BLD AUTO: 0 K/UL (ref 0–0.5)
EOSINOPHIL NFR BLD: 0.8 % (ref 0–8)
ERYTHROCYTE [DISTWIDTH] IN BLOOD BY AUTOMATED COUNT: 13.1 % (ref 11.5–14.5)
EST. GFR  (NO RACE VARIABLE): 29 ML/MIN/1.73 M^2
GLUCOSE SERPL-MCNC: 121 MG/DL (ref 70–110)
GLUCOSE UR QL STRIP: ABNORMAL
GRAN CASTS #/AREA URNS LPF: 2 /LPF
HCT VFR BLD AUTO: 38.6 % (ref 40–54)
HGB BLD-MCNC: 12.9 G/DL (ref 14–18)
HGB UR QL STRIP: ABNORMAL
HYALINE CASTS #/AREA URNS LPF: 0 /LPF
IMM GRANULOCYTES # BLD AUTO: 0.01 K/UL (ref 0–0.04)
IMM GRANULOCYTES NFR BLD AUTO: 0.3 % (ref 0–0.5)
KETONES UR QL STRIP: NEGATIVE
LEUKOCYTE ESTERASE UR QL STRIP: NEGATIVE
LYMPHOCYTES # BLD AUTO: 1.5 K/UL (ref 1–4.8)
LYMPHOCYTES NFR BLD: 41.3 % (ref 18–48)
MCH RBC QN AUTO: 29.9 PG (ref 27–31)
MCHC RBC AUTO-ENTMCNC: 33.4 G/DL (ref 32–36)
MCV RBC AUTO: 90 FL (ref 82–98)
MICROSCOPIC COMMENT: ABNORMAL
MONOCYTES # BLD AUTO: 0.6 K/UL (ref 0.3–1)
MONOCYTES NFR BLD: 15.8 % (ref 4–15)
NEUTROPHILS # BLD AUTO: 1.5 K/UL (ref 1.8–7.7)
NEUTROPHILS NFR BLD: 41 % (ref 38–73)
NITRITE UR QL STRIP: NEGATIVE
NRBC BLD-RTO: 0 /100 WBC
PH UR STRIP: 6 [PH] (ref 5–8)
PLATELET # BLD AUTO: 98 K/UL (ref 150–450)
PMV BLD AUTO: 10.9 FL (ref 9.2–12.9)
POC MOLECULAR INFLUENZA A AGN: NEGATIVE
POC MOLECULAR INFLUENZA B AGN: NEGATIVE
POTASSIUM SERPL-SCNC: 4.4 MMOL/L (ref 3.5–5.1)
PROT SERPL-MCNC: 5.9 G/DL (ref 6–8.4)
PROT UR QL STRIP: ABNORMAL
RBC # BLD AUTO: 4.31 M/UL (ref 4.6–6.2)
RBC #/AREA URNS HPF: 4 /HPF (ref 0–4)
SARS-COV-2 RDRP RESP QL NAA+PROBE: POSITIVE
SODIUM SERPL-SCNC: 139 MMOL/L (ref 136–145)
SP GR UR STRIP: 1.02 (ref 1–1.03)
URN SPEC COLLECT METH UR: ABNORMAL
UROBILINOGEN UR STRIP-ACNC: NEGATIVE EU/DL
WBC # BLD AUTO: 3.61 K/UL (ref 3.9–12.7)
WBC #/AREA URNS HPF: 1 /HPF (ref 0–5)

## 2023-07-07 PROCEDURE — 81000 URINALYSIS NONAUTO W/SCOPE: CPT | Performed by: EMERGENCY MEDICINE

## 2023-07-07 PROCEDURE — 87635 SARS-COV-2 COVID-19 AMP PRB: CPT | Performed by: INTERNAL MEDICINE

## 2023-07-07 PROCEDURE — 96360 HYDRATION IV INFUSION INIT: CPT

## 2023-07-07 PROCEDURE — 80053 COMPREHEN METABOLIC PANEL: CPT | Performed by: EMERGENCY MEDICINE

## 2023-07-07 PROCEDURE — 99284 EMERGENCY DEPT VISIT MOD MDM: CPT | Mod: 25

## 2023-07-07 PROCEDURE — 25000003 PHARM REV CODE 250: Performed by: EMERGENCY MEDICINE

## 2023-07-07 PROCEDURE — 87502 INFLUENZA DNA AMP PROBE: CPT

## 2023-07-07 PROCEDURE — 85025 COMPLETE CBC W/AUTO DIFF WBC: CPT | Performed by: EMERGENCY MEDICINE

## 2023-07-07 RX ADMIN — SODIUM CHLORIDE 500 ML: 9 INJECTION, SOLUTION INTRAVENOUS at 08:07

## 2023-07-07 NOTE — ED NOTES
Patient denies nausea at this time. Patient given turkey sandwich with lemonade and coffee per PO challenge. Dr. Castro notified.

## 2023-07-07 NOTE — DISCHARGE INSTRUCTIONS
1.) Follow up with nephrology.  Your primary care provider has put in a referral on your behalf for this.  2.)  Stay home for at least 5 days with your COVID-19 diagnosis.  You can end your isolation after 5 days if your fever free without the use of medications for at least 24 hours and your symptoms are improving.  3.)  Your platelets did come back low on your CBC today however not dangerously low.  Call your primary care provider today and set up a recheck appointment to have this test rechecked in the next week,

## 2023-07-07 NOTE — ED PROVIDER NOTES
Encounter Date: 7/7/2023       History     Chief Complaint   Patient presents with    COVID-19 Concerns    Back Pain    Chills    Diarrhea    Nausea    Abdominal Pain    Vomiting     67 yom c/o covid concerns x 1 week. Pt has taken an at home 2 x covid test which resulted positive. Pt states his wife was sick as well. Pt states he gave blood and was advised to report to the ED due to his kidneys failing.      Patient presents at the request of his primary care provider.  He has a history of chronic kidney disease.  He has taken 2 at home COVID test and both have been positive.  He had blood work performed yesterday which showed a potassium of 5.3, a BUN of 23 and a creatinine of 2.4.  His primary care provider informed him that his kidney function had worsened and that he should be seen in the emergency department.  He does complain of sweating at night, denies any nausea or vomiting.  He has been able to urinate every day.  He states that he has not drank as much fluids as he usually does because he has not been as active as he usually is.  He has no other acute complaints.  Blood work from 3 months ago showed a BUN of 24 and a creatinine of 2.2.  In looking at records it appears his primary care provider has placed a referral to nephrology for him.    Review of patient's allergies indicates:  No Known Allergies  Past Medical History:   Diagnosis Date    Alcohol abuse     Anasarca 1/28/2019    Anemia     Anticoagulant long-term use     Arthropathy associated with neurological disorder 9/2/2015    Atherosclerosis     Charcot foot due to diabetes mellitus     Chronic combined systolic and diastolic heart failure 01/29/2019 1-28-19 Left VentricleModerate decreased ejection fraction at 30%. Normal left ventricular cavity size. Normal wall thickness observed. Grade I (mild) left ventricular diastolic dysfunction consistent with impaired relaxation. Normal left atrial pressure. Moderate, global hypokinesis(see wall  scoring diagram). Right VentricleNormal cavity size, wall thickness and ejection fraction. Wall motion n    Chronic pancreatitis 1/28/2019    CKD (chronic kidney disease) stage 3, GFR 30-59 ml/min     CKD (chronic kidney disease) stage 4, GFR 15-29 ml/min     Colon polyps     approx 5 yrs ago    Coronary artery disease due to calcified coronary lesion 05/08/2015    5 stents on ASA      Diabetic polyneuropathy associated with type 2 diabetes mellitus 9/2/2015    Diverticulosis 1/28/2019    DM type 2 with diabetic peripheral neuropathy 2/4/2019    Encounter for blood transfusion     Essential hypertension 1/28/2019    Former smoker 8/26/2015    Healed ulcer of left foot on examination 6/20/2017    Hematuria     Hydrocele     approx 1.5 yrs ago    Hyperphosphatemia     Hypoalbuminemia 2/4/2019    Hypocalcemia     Lymphedema of both lower extremities 1/29/2019    Mixed hyperlipidemia 5/8/2015    Morbid obesity with BMI of 50.0-59.9, adult 5/8/2015    Obstruction of right ureteropelvic junction (UPJ) due to stone 5/24/2021    Onychomycosis of multiple toenails with type 2 diabetes mellitus and peripheral neuropathy 6/20/2017    Perianal cyst     approx 2 yrs ago    Proteinuria     Pseudocyst of pancreas 1/28/2019 1-28-19 Liver has a cirrhotic morphology with no focal lesions.  Significant interval increase in ascites when compared to prior exam which may account for patient's abdominal distension.  Hypodense air-fluid collection along the body of the pancreas which is slightly smaller when compared to prior CT.  Findings may relate to pancreatic necrosis with pancreatic pseudocysts with infected pseudocyst    Skin cancer     skin cancer    Sleep apnea 8/26/2015    Status post bariatric surgery 1/11/2016    Type 2 diabetes mellitus, with long-term current use of insulin 5/8/2015    Urinary tract infection      Past Surgical History:   Procedure Laterality Date    ANGIOGRAPHY N/A 6/28/2019    Procedure: ANGIOGRAM-PV  STENT;  Surgeon: [facility] Provider;  Location: [facility] OR;  Service: Radiology;  Laterality: N/A;    ANGIOPLASTY      total x5 stents    COLONOSCOPY N/A 10/6/2015    Procedure: COLONOSCOPY;  Surgeon: Shekhar Richards MD;  Location: [facility] (2ND FLR);  Service: Endoscopy;  Laterality: N/A;  BMI over 55/2nd floor case    5 day hold Plavix, Dr Kwadwo Arroyo    COLONOSCOPY N/A 5/13/2021    Procedure: COLONOSCOPY;  Surgeon: Huan Brumfield MD;  Location: [facility];  Service: Endoscopy;  Laterality: N/A;    CORONARY ANGIOGRAPHY Right 3/20/2019    Procedure: ANGIOGRAM, CORONARY ARTERY;  Surgeon: Bob Duque MD;  Location: [facility] CATH LAB;  Service: Cardiology;  Laterality: Right;    CORONARY ARTERY BYPASS GRAFT  2017    x3    CORONARY BYPASS GRAFT ANGIOGRAPHY  3/20/2019    Procedure: Bypass graft study;  Surgeon: Bob Duque MD;  Location: [facility] CATH LAB;  Service: Cardiology;;    CYST REMOVAL      CYSTOSCOPY Right 6/30/2021    Procedure: CYSTOSCOPY;  Surgeon: William Diaz MD;  Location: [facility] 1ST FLR;  Service: Urology;  Laterality: Right;    CYSTOSCOPY W/ URETERAL STENT PLACEMENT Right 6/16/2021    Procedure: CYSTOSCOPY, WITH URETERAL STENT INSERTION;  Surgeon: William Diaz MD;  Location: [facility] 2ND FLR;  Service: Urology;  Laterality: Right;  FLUORO LESS THAN 1 HOUR    ENDOSCOPIC ULTRASOUND OF UPPER GASTROINTESTINAL TRACT N/A 2/26/2020    Procedure: ULTRASOUND, UPPER GI TRACT, ENDOSCOPIC;  Surgeon: Robbie Yang MD;  Location: [facility];  Service: Endoscopy;  Laterality: N/A;    ESOPHAGOGASTRODUODENOSCOPY N/A 7/8/2019    Procedure: ESOPHAGOGASTRODUODENOSCOPY (EGD);  Surgeon: Huan Brumfield MD;  Location: [facility];  Service: Endoscopy;  Laterality: N/A;    ESOPHAGOGASTRODUODENOSCOPY N/A 5/13/2021    Procedure: EGD (ESOPHAGOGASTRODUODENOSCOPY);  Surgeon: Huan Brumfield MD;  Location: [facility];  Service: Endoscopy;  Laterality: N/A;    EXCISION OF HYDROCELE Bilateral 6/16/2021    Procedure:  HYDROCELE REPAIR;  Surgeon: William Diaz MD;  Location: [facility] OR 2ND FLR;  Service: Urology;  Laterality: Bilateral;  2 HOURS    FLUOROSCOPY N/A 6/16/2021    Procedure: FLUOROSCOPY;  Surgeon: William Diaz MD;  Location: [facility] OR 2ND FLR;  Service: Urology;  Laterality: N/A;    GASTRECTOMY      INSERTION OF DIALYSIS CATHETER N/A 5/17/2019    Procedure: pleurx;  Surgeon: [facility] Provider;  Location: [facility] OR 2ND FLR;  Service: General;  Laterality: N/A;  Room 188/[facility]    KNEE ARTHROSCOPY      LAPAROSCOPIC CHOLECYSTECTOMY N/A 5/4/2020    Procedure: CHOLECYSTECTOMY, LAPAROSCOPIC;  Surgeon: SON Rowe MD;  Location: [facility] OR;  Service: General;  Laterality: N/A;    LASER LITHOTRIPSY N/A 6/30/2021    Procedure: LITHOTRIPSY, USING LASER;  Surgeon: William Diaz MD;  Location: [facility] OR 1ST FLR;  Service: Urology;  Laterality: N/A;    LIVER BIOPSY N/A 5/4/2020    Procedure: BIOPSY, LIVER, Laproscopic ;  Surgeon: SON Rowe MD;  Location: [facility] OR;  Service: General;  Laterality: N/A;    perianal surgery      perianal cyst removed    PYELOSCOPY Right 6/30/2021    Procedure: PYELOSCOPY;  Surgeon: William Diaz MD;  Location: [facility] OR 1ST FLR;  Service: Urology;  Laterality: Right;    REPLACEMENT OF STENT Right 6/30/2021    Procedure: REPLACEMENT, STENT;  Surgeon: William Diaz MD;  Location: [facility] OR 1ST FLR;  Service: Urology;  Laterality: Right;    URETEROSCOPY Right 6/30/2021    Procedure: URETEROSCOPY FLEXIBLE URETEROSCOPE;  Surgeon: William Diaz MD;  Location: [facility] OR 1ST FLR;  Service: Urology;  Laterality: Right;     Family History   Problem Relation Age of Onset    Cancer Mother     Cancer Father     Heart disease Father     Obesity Sister     Parkinsonism Brother     No Known Problems Paternal Grandmother     Cancer Paternal Grandfather     Cancer Brother     Diabetes Maternal Grandmother     Stroke Maternal Grandfather     Cirrhosis Neg Hx      Social History     Tobacco Use    Smoking  status: Former     Packs/day: 2.00     Years: 30.00     Pack years: 60.00     Types: Cigarettes     Quit date: 2005     Years since quittin.4    Smokeless tobacco: Never   Substance Use Topics    Alcohol use: No     Comment: started ~, reports 1 shot daily, max 3 shots daily, vague about alcohol consumption. Last drink 2018    Drug use: No     Review of Systems   Respiratory:  Negative for shortness of breath.    Cardiovascular:  Negative for chest pain.   Gastrointestinal:  Negative for nausea and vomiting.     Physical Exam     Initial Vitals [23 0627]   BP Pulse Resp Temp SpO2   (!) 175/93 86 18 98.1 °F (36.7 °C) 100 %      MAP       --         Physical Exam    Vitals reviewed.  Constitutional: He appears well-developed and well-nourished.   Cardiovascular:  Normal rate and regular rhythm.           No murmur heard.  Pulmonary/Chest: Breath sounds normal. He has no wheezes.   Abdominal: Abdomen is soft. He exhibits no distension. There is no abdominal tenderness.   Musculoskeletal:      Comments: 2+ nonpitting edema bilateral lower extremities, patient states he has a history of lymphedema and this is baseline.     Neurological: He is alert and oriented to person, place, and time. He has normal strength. No sensory deficit.   Skin: Skin is warm and dry.   Psychiatric: He has a normal mood and affect.       ED Course   Procedures  Labs Reviewed   CBC W/ AUTO DIFFERENTIAL - Abnormal; Notable for the following components:       Result Value    WBC 3.61 (*)     RBC 4.31 (*)     Hemoglobin 12.9 (*)     Hematocrit 38.6 (*)     Platelets 98 (*)     Gran # (ANC) 1.5 (*)     Mono % 15.8 (*)     All other components within normal limits   COMPREHENSIVE METABOLIC PANEL - Abnormal; Notable for the following components:    CO2 22 (*)     Glucose 121 (*)     BUN 26 (*)     Creatinine 2.4 (*)     Calcium 8.1 (*)     Total Protein 5.9 (*)     Albumin 2.5 (*)     AST 58 (*)     ALT 47 (*)     eGFR 29 (*)      All other components within normal limits   URINALYSIS, REFLEX TO URINE CULTURE - Abnormal; Notable for the following components:    Appearance, UA Hazy (*)     Protein, UA 3+ (*)     Glucose, UA 1+ (*)     Occult Blood UA 2+ (*)     All other components within normal limits    Narrative:     Specimen Source->Urine   URINALYSIS MICROSCOPIC - Abnormal; Notable for the following components:    Bacteria Few (*)     Granular Casts, UA 2 (*)     All other components within normal limits    Narrative:     Specimen Source->Urine   SARS-COV-2 RDRP GENE - Abnormal; Notable for the following components:    POC Rapid COVID Positive (*)     All other components within normal limits    Narrative:     This test utilizes isothermal nucleic acid amplification technology to detect the SARS-CoV-2 RdRp nucleic acid segment. The analytical sensitivity (limit of detection) is 500 copies/swab.     A POSITIVE result is indicative of the presence of SARS-CoV-2 RNA; clinical correlation with patient history and other diagnostic information is necessary to determine patient infection status.    A NEGATIVE result means that SARS-CoV-2 nucleic acids are not present above the limit of detection. A NEGATIVE result should be treated as presumptive. It does not rule out the possibility of COVID-19 and should not be the sole basis for treatment decisions. If COVID-19 is strongly suspected based on clinical and exposure history, re-testing using an alternate molecular assay should be considered.     This test is only for use under the Food and Drug Administration s Emergency Use Authorization (EUA).     Commercial kits are provided by "[employer]". Performance characteristics of the EUA have been independently verified by [facility] Department of Pathology and Laboratory Medicine.   _________________________________________________________________   The authorized Fact Sheet for Healthcare Providers and the authorized Fact Sheet  for Patients of the ID NOW COVID-19 are available on the FDA website:    https://www.fda.gov/media/194569/download      https://www.fda.gov/media/234000/download      POCT INFLUENZA A/B MOLECULAR          Imaging Results    None          Medications   sodium chloride 0.9% bolus 500 mL 500 mL (0 mLs Intravenous Stopped 7/7/23 0939)     Medical Decision Making:   ED Management:  Patient is tolerating solids and liquids at bedside.  His potassium is within normal limits today.  Urine shows no evidence for urinary tract infection.  He was given quarantine guidelines for his diagnosis COVID-19.  He was informed that his primary care provider has placed a referral nephrology for him, he states that he already knew this.  He is instructed to return immediately for any new or worsening symptoms and he verbalized understanding.           ED Course as of 07/08/23 0336   Fri Jul 07, 2023   0742 POCT COVID-19 Rapid Screening(!)  pos [CD]   0742 POCT Influenza A/B Molecular  neg [CD]   0900 Urinalysis, Reflex to Urine Culture Urine, Clean Catch(!)  Not uti [CD]   0905 CBC auto differential(!)  Nonspecific findings [CD]   0915 Comprehensive metabolic panel(!)  Ckd noted, elevation of transaminases [CD]      ED Course User Index  [CD] Abdullahi Castor DO                 Clinical Impression:   Final diagnoses:  [U07.1] COVID-19 (Primary)  [N18.9] Chronic kidney disease, unspecified CKD stage        ED Disposition Condition    Discharge Stable          ED Prescriptions    None       Follow-up Information       Follow up With Specialties Details Why Contact Info    Leon Barajas DO Internal Medicine Schedule an appointment as soon as possible for a visit   [address] [facility] [address]  [phone]               Abdullahi Castro DO  07/08/23 0331

## 2023-07-07 NOTE — ED NOTES
Charge nurse Anna @ bedside with U/S machine to start IV and collect bloodwork. Patient transferred to ED room 03 due to concerns for kidney failure. Patient tested positive for Covid-19 in ED this morning and is c/o lower back pain, N/V, fever and chills, but denies any respiratory symptoms and is 100% on RA. Patient reports wife has Covid-19 as well. Patient states he gave blood yesterday and received a phone call that he may be in kidney failure which prompted patient to come into ED for further evaluation. Patient is in stable condition at this time, VSS. NADN.

## 2023-07-10 ENCOUNTER — PATIENT MESSAGE (OUTPATIENT)
Dept: DIABETES | Facility: CLINIC | Age: 68
End: 2023-07-10
Payer: MEDICARE

## 2023-07-10 ENCOUNTER — TELEPHONE (OUTPATIENT)
Dept: INTERNAL MEDICINE | Facility: CLINIC | Age: 68
End: 2023-07-10
Payer: MEDICARE

## 2023-07-10 NOTE — TELEPHONE ENCOUNTER
----- Message from Gini Mcdonald sent at 7/8/2023 11:04 AM CDT -----  Contact: FLORIAN HENDERSON [[id number]]@ [phone]  Patient has to cancelled his 7/13 appointment and would like you to call and reschedule it. He's has been diagnosed with Covid.  Please call later.

## 2023-07-11 ENCOUNTER — NURSE TRIAGE (OUTPATIENT)
Dept: ADMINISTRATIVE | Facility: CLINIC | Age: 68
End: 2023-07-11
Payer: MEDICARE

## 2023-07-11 NOTE — TELEPHONE ENCOUNTER
Pt responded to [facility] text message. Spoke with pt who reports that he was diagnosed with COVID last week. Asking about about quarantine guidelines. Pt advised based on CDC guidelines. Verbalized understanding.  Reason for Disposition   Information only question and nurse able to answer    Protocols used: Information Only Call - No Triage-A-[place]

## 2023-07-13 ENCOUNTER — NURSE TRIAGE (OUTPATIENT)
Dept: ADMINISTRATIVE | Facility: CLINIC | Age: 68
End: 2023-07-13
Payer: MEDICARE

## 2023-07-13 NOTE — TELEPHONE ENCOUNTER
3 attempts to contact caller and no contact made. Left vm with on call nurse number instructing caller to call back if still needing assistance.   Reason for Disposition   Third attempt to contact caller AND no contact made. Phone number verified.    Protocols used: No Contact or Duplicate Contact Call-A-[place]

## 2023-07-13 NOTE — TELEPHONE ENCOUNTER
Reason for Disposition   Information only question and nurse able to answer    Protocols used: Information Only Call - No Triage-A-[place]  Pt states he tested positive for COVID on 7/7/23. States his test still shows positive. Pt advised the CDC does not recommend retesting. Advised a positive result can show for 90 days. Pt verbalized understanding.

## 2023-07-28 ENCOUNTER — OFFICE VISIT (OUTPATIENT)
Dept: URGENT CARE | Facility: CLINIC | Age: 68
End: 2023-07-28
Payer: MEDICARE

## 2023-07-28 ENCOUNTER — PATIENT MESSAGE (OUTPATIENT)
Dept: CARDIOLOGY | Facility: CLINIC | Age: 68
End: 2023-07-28
Payer: MEDICARE

## 2023-07-28 VITALS
TEMPERATURE: 99 F | BODY MASS INDEX: 40.34 KG/M2 | HEIGHT: 67 IN | WEIGHT: 257 LBS | RESPIRATION RATE: 18 BRPM | HEART RATE: 88 BPM | SYSTOLIC BLOOD PRESSURE: 131 MMHG | DIASTOLIC BLOOD PRESSURE: 76 MMHG | OXYGEN SATURATION: 99 %

## 2023-07-28 DIAGNOSIS — M79.605 LEFT LEG PAIN: Primary | ICD-10-CM

## 2023-07-28 PROCEDURE — 99213 PR OFFICE/OUTPT VISIT, EST, LEVL III, 20-29 MIN: ICD-10-PCS | Mod: S$GLB,,, | Performed by: PHYSICIAN ASSISTANT

## 2023-07-28 PROCEDURE — 99213 OFFICE O/P EST LOW 20 MIN: CPT | Mod: S$GLB,,, | Performed by: PHYSICIAN ASSISTANT

## 2023-07-28 NOTE — PROGRESS NOTES
"Subjective:      Patient ID: Jian Arrieta is a 67 y.o. male.    Vitals:  height is 5' 7" (1.702 m) and weight is 116.6 kg (257 lb). His oral temperature is 99.3 °F (37.4 °C). His blood pressure is 131/76 and his pulse is 88. His respiration is 18 and oxygen saturation is 99%.     Chief Complaint: Leg Pain    This is a 67 y.o. male who presents today with a chief complaint of Leg Pain. Patient has a pain that run from inner thigh to his ankle.  Patient states he had 2 dogs status week 1 ways over    Leg Pain   The incident occurred 12 to 24 hours ago. The incident occurred at home. There was no injury mechanism. The pain is present in the left ankle and left thigh. The quality of the pain is described as aching. The pain is at a severity of 6/10. The pain is moderate. The pain has been Constant since onset. Associated symptoms include an inability to bear weight. He reports no foreign bodies present. The symptoms are aggravated by weight bearing. He has tried nothing for the symptoms. The treatment provided no relief.   Constitution: Negative for chills, sweating, fatigue and fever.   Neck: Negative for painful lymph nodes.   Cardiovascular:  Negative for chest pain.   Respiratory:  Negative for shortness of breath.    Musculoskeletal:  Positive for pain, pain with walking and muscle ache. Negative for trauma, joint pain, joint swelling, abnormal ROM of joint, back pain and muscle cramps.   Skin:  Negative for color change, pale, rash and erythema.   Neurological:  Negative for dizziness and altered mental status.   Hematologic/Lymphatic: Negative for swollen lymph nodes.   Psychiatric/Behavioral:  Negative for altered mental status.    Past Medical History:   Diagnosis Date    Alcohol abuse     Anasarca 1/28/2019    Anemia     Anticoagulant long-term use     Arthropathy associated with neurological disorder 9/2/2015    Atherosclerosis     Charcot foot due to diabetes mellitus     Chronic combined systolic and " diastolic heart failure 01/29/2019 1-28-19 Left VentricleModerate decreased ejection fraction at 30%. Normal left ventricular cavity size. Normal wall thickness observed. Grade I (mild) left ventricular diastolic dysfunction consistent with impaired relaxation. Normal left atrial pressure. Moderate, global hypokinesis(see wall scoring diagram). Right VentricleNormal cavity size, wall thickness and ejection fraction. Wall motion n    Chronic pancreatitis 1/28/2019    CKD (chronic kidney disease) stage 3, GFR 30-59 ml/min     CKD (chronic kidney disease) stage 4, GFR 15-29 ml/min     Colon polyps     approx 5 yrs ago    Coronary artery disease due to calcified coronary lesion 05/08/2015    5 stents on ASA      Diabetic polyneuropathy associated with type 2 diabetes mellitus 9/2/2015    Diverticulosis 1/28/2019    DM type 2 with diabetic peripheral neuropathy 2/4/2019    Encounter for blood transfusion     Essential hypertension 1/28/2019    Former smoker 8/26/2015    Healed ulcer of left foot on examination 6/20/2017    Hematuria     Hydrocele     approx 1.5 yrs ago    Hyperphosphatemia     Hypoalbuminemia 2/4/2019    Hypocalcemia     Lymphedema of both lower extremities 1/29/2019    Mixed hyperlipidemia 5/8/2015    Morbid obesity with BMI of 50.0-59.9, adult 5/8/2015    Obstruction of right ureteropelvic junction (UPJ) due to stone 5/24/2021    Onychomycosis of multiple toenails with type 2 diabetes mellitus and peripheral neuropathy 6/20/2017    Perianal cyst     approx 2 yrs ago    Proteinuria     Pseudocyst of pancreas 1/28/2019 1-28-19 Liver has a cirrhotic morphology with no focal lesions.  Significant interval increase in ascites when compared to prior exam which may account for patient's abdominal distension.  Hypodense air-fluid collection along the body of the pancreas which is slightly smaller when compared to prior CT.  Findings may relate to pancreatic necrosis with pancreatic pseudocysts with  infected pseudocyst    Skin cancer     skin cancer    Sleep apnea 8/26/2015    Status post bariatric surgery 1/11/2016    Type 2 diabetes mellitus, with long-term current use of insulin 5/8/2015    Urinary tract infection        Past Surgical History:   Procedure Laterality Date    ANGIOGRAPHY N/A 6/28/2019    Procedure: ANGIOGRAM-PV STENT;  Surgeon: Ewa Diagnostic Provider;  Location: [facility] OR;  Service: Radiology;  Laterality: N/A;    ANGIOPLASTY      total x5 stents    COLONOSCOPY N/A 10/6/2015    Procedure: COLONOSCOPY;  Surgeon: Shekhar Richards MD;  Location: [facility] (2ND FLR);  Service: Endoscopy;  Laterality: N/A;  BMI over 55/2nd floor case    5 day hold Plavix, Dr Kwadwo Arroyo    COLONOSCOPY N/A 5/13/2021    Procedure: COLONOSCOPY;  Surgeon: Huan Brumfield MD;  Location: [facility];  Service: Endoscopy;  Laterality: N/A;    CORONARY ANGIOGRAPHY Right 3/20/2019    Procedure: ANGIOGRAM, CORONARY ARTERY;  Surgeon: Bob Duque MD;  Location: [facility] CATH LAB;  Service: Cardiology;  Laterality: Right;    CORONARY ARTERY BYPASS GRAFT  2017    x3    CORONARY BYPASS GRAFT ANGIOGRAPHY  3/20/2019    Procedure: Bypass graft study;  Surgeon: Bob Duque MD;  Location: [facility] CATH LAB;  Service: Cardiology;;    CYST REMOVAL      CYSTOSCOPY Right 6/30/2021    Procedure: CYSTOSCOPY;  Surgeon: William Diaz MD;  Location: [facility] 1ST FLR;  Service: Urology;  Laterality: Right;    CYSTOSCOPY W/ URETERAL STENT PLACEMENT Right 6/16/2021    Procedure: CYSTOSCOPY, WITH URETERAL STENT INSERTION;  Surgeon: William Diaz MD;  Location: [facility] 2ND FLR;  Service: Urology;  Laterality: Right;  FLUORO LESS THAN 1 HOUR    ENDOSCOPIC ULTRASOUND OF UPPER GASTROINTESTINAL TRACT N/A 2/26/2020    Procedure: ULTRASOUND, UPPER GI TRACT, ENDOSCOPIC;  Surgeon: Robbie Yang MD;  Location: [facility];  Service: Endoscopy;  Laterality: N/A;    ESOPHAGOGASTRODUODENOSCOPY N/A 7/8/2019    Procedure: ESOPHAGOGASTRODUODENOSCOPY (EGD);   Surgeon: Huan Brumfield MD;  Location: [facility] ENDO;  Service: Endoscopy;  Laterality: N/A;    ESOPHAGOGASTRODUODENOSCOPY N/A 5/13/2021    Procedure: EGD (ESOPHAGOGASTRODUODENOSCOPY);  Surgeon: Huan Brumfield MD;  Location: [facility] ENDO;  Service: Endoscopy;  Laterality: N/A;    EXCISION OF HYDROCELE Bilateral 6/16/2021    Procedure: HYDROCELE REPAIR;  Surgeon: William Diaz MD;  Location: [facility] OR 2ND FLR;  Service: Urology;  Laterality: Bilateral;  2 HOURS    FLUOROSCOPY N/A 6/16/2021    Procedure: FLUOROSCOPY;  Surgeon: William Diaz MD;  Location: [facility] OR 2ND FLR;  Service: Urology;  Laterality: N/A;    GASTRECTOMY      INSERTION OF DIALYSIS CATHETER N/A 5/17/2019    Procedure: pleurx;  Surgeon: Ewa Diagnostic Provider;  Location: [facility] OR 2ND FLR;  Service: General;  Laterality: N/A;  Room [facility]    KNEE ARTHROSCOPY      LAPAROSCOPIC CHOLECYSTECTOMY N/A 5/4/2020    Procedure: CHOLECYSTECTOMY, LAPAROSCOPIC;  Surgeon: SON Rowe MD;  Location: [facility] OR;  Service: General;  Laterality: N/A;    LASER LITHOTRIPSY N/A 6/30/2021    Procedure: LITHOTRIPSY, USING LASER;  Surgeon: William Diaz MD;  Location: [facility] OR 1ST FLR;  Service: Urology;  Laterality: N/A;    LIVER BIOPSY N/A 5/4/2020    Procedure: BIOPSY, LIVER, Laproscopic ;  Surgeon: SON Rowe MD;  Location: [facility] OR;  Service: General;  Laterality: N/A;    perianal surgery      perianal cyst removed    PYELOSCOPY Right 6/30/2021    Procedure: PYELOSCOPY;  Surgeon: William Diaz MD;  Location: [facility] OR 1ST FLR;  Service: Urology;  Laterality: Right;    REPLACEMENT OF STENT Right 6/30/2021    Procedure: REPLACEMENT, STENT;  Surgeon: William Diaz MD;  Location: [facility] OR 1ST FLR;  Service: Urology;  Laterality: Right;    URETEROSCOPY Right 6/30/2021    Procedure: URETEROSCOPY FLEXIBLE URETEROSCOPE;  Surgeon: William Diaz MD;  Location: [facility] OR 1ST FLR;  Service: Urology;  Laterality: Right;       Family History   Problem Relation Age of  "Onset    Cancer Mother     Cancer Father     Heart disease Father     Obesity Sister     Parkinsonism Brother     No Known Problems Paternal Grandmother     Cancer Paternal Grandfather     Cancer Brother     Diabetes Maternal Grandmother     Stroke Maternal Grandfather     Cirrhosis Neg Hx        Social History     Socioeconomic History    Marital status:    Tobacco Use    Smoking status: Former     Packs/day: 2.00     Years: 30.00     Pack years: 60.00     Types: Cigarettes     Quit date: 2005     Years since quittin.4    Smokeless tobacco: Never   Substance and Sexual Activity    Alcohol use: No     Comment: started ~, reports 1 shot daily, max 3 shots daily, vague about alcohol consumption. Last drink 2018    Drug use: No       Current Outpatient Medications   Medication Sig Dispense Refill    blood-glucose sensor (DEXCOM G6 SENSOR) Sandi Inject 1 each into the skin every 10 days. 3 each 11    blood-glucose transmitter (DEXCOM G6 TRANSMITTER) Sandi Inject 1 each into the skin every 10 days. 1 each 3    bumetanide (BUMEX) 1 MG tablet TAKE 1 TABLET (1 MG TOTAL) BY MOUTH BEFORE BREAKFAST AND 1/2 TABLET (0.5 MG TOTAL) EVERY EVENING. 135 tablet 3    insulin glargine, TOUJEO, (TOUJEO SOLOSTAR U-300 INSULIN) 300 unit/mL (1.5 mL) InPn pen Inject 30 Units into the skin once daily. Can increase dose by 2 units higher to get to goal fasting glucose 100 - 150 - for max TDD 60 units. Takes daily IN AN EMERGENCY only if OFF OF insulin pump. 15 mL 1    insulin lispro-aabc (LYUMJEV U-100 INSULIN) 100 unit/mL INJECT 80 UNITS INTO THE SKIN CONTINUOUS. USES IN OMNIPOD INSULIN PUMP. USE AS DIRECTED. DO NOT DIRECTLY INJECT. 30 mL 6    insulin pump cart,automated,BT (OMNIPOD 5 G6 PODS, GEN 5,) Crtg Inject 1 each into the skin every other day. Use with omnipod 5 pdm as directed. 15 each 11    pen needle, diabetic 32 gauge x /32" Ndle Use with toujeo insulin one daily only as Emergency use/back up insulin - if OFF " OF your insulin pump. 30 each 3    aspirin (ECOTRIN) 81 MG EC tablet Take 1 tablet (81 mg total) by mouth once daily. 9 tablet 0    HUMALOG U-100 INSULIN 100 unit/mL injection        No current facility-administered medications for this visit.       Review of patient's allergies indicates:  No Known Allergies     Objective:     Physical Exam   Constitutional: He is oriented to person, place, and time.  Non-toxic appearance. He does not appear ill. No distress.   Cardiovascular: Normal rate, regular rhythm, normal heart sounds and normal pulses.   No murmur heard.Exam reveals no gallop.   Pulmonary/Chest: Effort normal and breath sounds normal. No stridor. No respiratory distress. He has no wheezes. He has no rhonchi. He has no rales.   Abdominal: Normal appearance.   Musculoskeletal: Normal range of motion.         General: Swelling and tenderness present. No deformity or signs of injury. Normal range of motion.      Right knee: Normal.      Left knee: He exhibits no bony tenderness. Tenderness found.      Left ankle: He exhibits swelling. No tenderness. Achilles tendon normal.      Right upper leg: Normal.      Left upper leg: He exhibits tenderness. He exhibits no bony tenderness.      Right lower leg: Normal.      Left lower leg: He exhibits tenderness and swelling. He exhibits no bony tenderness.        Legs:       Left foot: Swelling present. No bony tenderness.      Comments: Pain upon palpation of the red line, no localized swelling, increased warmth or wounds noted    Neurological: He is alert and oriented to person, place, and time.   Skin: Skin is warm, dry, not diaphoretic, not pale and no rash. No bruising and No erythema   Psychiatric: His behavior is normal. Mood, judgment and thought content normal.   Nursing note and vitals reviewed.    Assessment:     1. Left leg pain        Plan:       Left leg pain  -     CV Ultrasound doppler venous DVT leg left; Future; Expected date: 07/28/2023    I have  reviewed the patient chart and pertinent past imaging/labs.  As we discussed this could be a muscle strain from lifting the dogs however we will rule out a DVT.  Patient Instructions   You have an appointment at main [facility] on July 31st at 9:30 a.m. on the 3rd floor cardiology department for a DVT ultrasound.  Should you want to change the appointment please call [phone].  Use Tylenol for pain relief in the meantime take with food.  Ice for 15 minutes at a time 3 to 4 times a day.  Should your symptoms worsen urine strict ER precautions to go get evaluated they can do an ultrasound in the ER.

## 2023-07-28 NOTE — PATIENT INSTRUCTIONS
You have an appointment at [facility] on July 31st at 9:30 a.m. on the 3rd floor cardiology department for a DVT ultrasound.  Should you want to change the appointment please call [phone].  Use Tylenol for pain relief in the meantime take with food.  Ice for 15 minutes at a time 3 to 4 times a day.  Should your symptoms worsen urine strict ER precautions to go get evaluated they can do an ultrasound in the ER.

## 2023-07-31 ENCOUNTER — CLINICAL SUPPORT (OUTPATIENT)
Dept: REHABILITATION | Facility: HOSPITAL | Age: 68
End: 2023-07-31
Attending: STUDENT IN AN ORGANIZED HEALTH CARE EDUCATION/TRAINING PROGRAM
Payer: MEDICARE

## 2023-07-31 ENCOUNTER — HOSPITAL ENCOUNTER (OUTPATIENT)
Dept: CARDIOLOGY | Facility: HOSPITAL | Age: 68
Discharge: HOME | End: 2023-07-31
Attending: PHYSICIAN ASSISTANT
Payer: MEDICARE

## 2023-07-31 DIAGNOSIS — I87.2 VENOUS INSUFFICIENCY OF BOTH LOWER EXTREMITIES: Primary | ICD-10-CM

## 2023-07-31 DIAGNOSIS — E11.22 TYPE 2 DIABETES MELLITUS WITH STAGE 3 CHRONIC KIDNEY DISEASE, WITH LONG-TERM CURRENT USE OF INSULIN, UNSPECIFIED WHETHER STAGE 3A OR 3B CKD: ICD-10-CM

## 2023-07-31 DIAGNOSIS — M79.605 LEFT LEG PAIN: ICD-10-CM

## 2023-07-31 DIAGNOSIS — I89.0 LYMPHEDEMA OF BOTH LOWER EXTREMITIES: Chronic | ICD-10-CM

## 2023-07-31 DIAGNOSIS — L03.119 CELLULITIS OF LOWER EXTREMITY, UNSPECIFIED LATERALITY: ICD-10-CM

## 2023-07-31 DIAGNOSIS — N18.30 TYPE 2 DIABETES MELLITUS WITH STAGE 3 CHRONIC KIDNEY DISEASE, WITH LONG-TERM CURRENT USE OF INSULIN, UNSPECIFIED WHETHER STAGE 3A OR 3B CKD: ICD-10-CM

## 2023-07-31 DIAGNOSIS — Z79.4 TYPE 2 DIABETES MELLITUS WITH STAGE 3 CHRONIC KIDNEY DISEASE, WITH LONG-TERM CURRENT USE OF INSULIN, UNSPECIFIED WHETHER STAGE 3A OR 3B CKD: ICD-10-CM

## 2023-07-31 PROCEDURE — 97163 PT EVAL HIGH COMPLEX 45 MIN: CPT

## 2023-07-31 PROCEDURE — 97535 SELF CARE MNGMENT TRAINING: CPT

## 2023-07-31 PROCEDURE — 93971 CV US DOPPLER VENOUS LEG LEFT (CUPID ONLY): ICD-10-PCS | Mod: 26,LT,, | Performed by: INTERNAL MEDICINE

## 2023-07-31 PROCEDURE — 93971 EXTREMITY STUDY: CPT | Mod: 26,LT,, | Performed by: INTERNAL MEDICINE

## 2023-07-31 PROCEDURE — 93971 EXTREMITY STUDY: CPT | Mod: LT

## 2023-07-31 NOTE — PROGRESS NOTES
See evaluation in POC for goals and assessment     Eval Date: 07/31/2023    Luisa Thompson, PT, DPT, CLT

## 2023-08-01 ENCOUNTER — TELEPHONE (OUTPATIENT)
Dept: URGENT CARE | Facility: CLINIC | Age: 68
End: 2023-08-01
Payer: MEDICARE

## 2023-08-01 ENCOUNTER — OFFICE VISIT (OUTPATIENT)
Dept: INTERNAL MEDICINE | Facility: CLINIC | Age: 68
End: 2023-08-01
Payer: MEDICARE

## 2023-08-01 VITALS
WEIGHT: 266.56 LBS | DIASTOLIC BLOOD PRESSURE: 88 MMHG | SYSTOLIC BLOOD PRESSURE: 140 MMHG | OXYGEN SATURATION: 98 % | RESPIRATION RATE: 15 BRPM | BODY MASS INDEX: 41.84 KG/M2 | HEART RATE: 75 BPM | TEMPERATURE: 98 F | HEIGHT: 67 IN

## 2023-08-01 DIAGNOSIS — I50.42 CHRONIC COMBINED SYSTOLIC AND DIASTOLIC HEART FAILURE: ICD-10-CM

## 2023-08-01 DIAGNOSIS — E11.22 TYPE 2 DIABETES MELLITUS WITH STAGE 3 CHRONIC KIDNEY DISEASE, WITH LONG-TERM CURRENT USE OF INSULIN, UNSPECIFIED WHETHER STAGE 3A OR 3B CKD: ICD-10-CM

## 2023-08-01 DIAGNOSIS — E10.65 TYPE 1 DIABETES MELLITUS WITH HYPERGLYCEMIA: Primary | ICD-10-CM

## 2023-08-01 DIAGNOSIS — E11.59 HYPERTENSION ASSOCIATED WITH DIABETES: Chronic | ICD-10-CM

## 2023-08-01 DIAGNOSIS — I15.2 HYPERTENSION ASSOCIATED WITH DIABETES: Chronic | ICD-10-CM

## 2023-08-01 DIAGNOSIS — I87.2 VENOUS INSUFFICIENCY OF BOTH LOWER EXTREMITIES: ICD-10-CM

## 2023-08-01 DIAGNOSIS — E11.40 TYPE 2 DIABETES MELLITUS WITH DIABETIC NEUROPATHY, WITH LONG-TERM CURRENT USE OF INSULIN: Chronic | ICD-10-CM

## 2023-08-01 DIAGNOSIS — N18.30 TYPE 2 DIABETES MELLITUS WITH STAGE 3 CHRONIC KIDNEY DISEASE, WITH LONG-TERM CURRENT USE OF INSULIN, UNSPECIFIED WHETHER STAGE 3A OR 3B CKD: ICD-10-CM

## 2023-08-01 DIAGNOSIS — E11.42 ONYCHOMYCOSIS OF MULTIPLE TOENAILS WITH TYPE 2 DIABETES MELLITUS AND PERIPHERAL NEUROPATHY: ICD-10-CM

## 2023-08-01 DIAGNOSIS — Z98.84 STATUS POST BARIATRIC SURGERY: ICD-10-CM

## 2023-08-01 DIAGNOSIS — I25.84 CORONARY ARTERY DISEASE DUE TO CALCIFIED CORONARY LESION: Chronic | ICD-10-CM

## 2023-08-01 DIAGNOSIS — I25.10 CORONARY ARTERY DISEASE DUE TO CALCIFIED CORONARY LESION: Chronic | ICD-10-CM

## 2023-08-01 DIAGNOSIS — E13.9 LADA (LATENT AUTOIMMUNE DIABETES IN ADULTS), MANAGED AS TYPE 1: ICD-10-CM

## 2023-08-01 DIAGNOSIS — K86.1 OTHER CHRONIC PANCREATITIS: ICD-10-CM

## 2023-08-01 DIAGNOSIS — B35.1 ONYCHOMYCOSIS OF MULTIPLE TOENAILS WITH TYPE 2 DIABETES MELLITUS AND PERIPHERAL NEUROPATHY: ICD-10-CM

## 2023-08-01 DIAGNOSIS — Z79.4 TYPE 2 DIABETES MELLITUS WITH DIABETIC NEUROPATHY, WITH LONG-TERM CURRENT USE OF INSULIN: Chronic | ICD-10-CM

## 2023-08-01 DIAGNOSIS — G47.30 SLEEP APNEA, UNSPECIFIED TYPE: ICD-10-CM

## 2023-08-01 DIAGNOSIS — E11.42 DIABETIC POLYNEUROPATHY ASSOCIATED WITH TYPE 2 DIABETES MELLITUS: ICD-10-CM

## 2023-08-01 DIAGNOSIS — I89.0 LYMPHEDEMA OF BOTH LOWER EXTREMITIES: Chronic | ICD-10-CM

## 2023-08-01 DIAGNOSIS — E11.69 ONYCHOMYCOSIS OF MULTIPLE TOENAILS WITH TYPE 2 DIABETES MELLITUS AND PERIPHERAL NEUROPATHY: ICD-10-CM

## 2023-08-01 DIAGNOSIS — Z79.4 TYPE 2 DIABETES MELLITUS WITH STAGE 3 CHRONIC KIDNEY DISEASE, WITH LONG-TERM CURRENT USE OF INSULIN, UNSPECIFIED WHETHER STAGE 3A OR 3B CKD: ICD-10-CM

## 2023-08-01 DIAGNOSIS — E11.69 HYPERLIPIDEMIA ASSOCIATED WITH TYPE 2 DIABETES MELLITUS: Chronic | ICD-10-CM

## 2023-08-01 DIAGNOSIS — E78.5 HYPERLIPIDEMIA ASSOCIATED WITH TYPE 2 DIABETES MELLITUS: Chronic | ICD-10-CM

## 2023-08-01 PROCEDURE — 99999 PR PBB SHADOW E&M-EST. PATIENT-LVL III: CPT | Mod: PBBFAC,,, | Performed by: NURSE PRACTITIONER

## 2023-08-01 PROCEDURE — 3079F PR MOST RECENT DIASTOLIC BLOOD PRESSURE 80-89 MM HG: ICD-10-PCS | Mod: CPTII,S$GLB,, | Performed by: NURSE PRACTITIONER

## 2023-08-01 PROCEDURE — 3066F NEPHROPATHY DOC TX: CPT | Mod: CPTII,S$GLB,, | Performed by: NURSE PRACTITIONER

## 2023-08-01 PROCEDURE — 1125F PR PAIN SEVERITY QUANTIFIED, PAIN PRESENT: ICD-10-PCS | Mod: CPTII,S$GLB,, | Performed by: NURSE PRACTITIONER

## 2023-08-01 PROCEDURE — 3077F PR MOST RECENT SYSTOLIC BLOOD PRESSURE >= 140 MM HG: ICD-10-PCS | Mod: CPTII,S$GLB,, | Performed by: NURSE PRACTITIONER

## 2023-08-01 PROCEDURE — 3066F PR DOCUMENTATION OF TREATMENT FOR NEPHROPATHY: ICD-10-PCS | Mod: CPTII,S$GLB,, | Performed by: NURSE PRACTITIONER

## 2023-08-01 PROCEDURE — 3062F POS MACROALBUMINURIA REV: CPT | Mod: CPTII,S$GLB,, | Performed by: NURSE PRACTITIONER

## 2023-08-01 PROCEDURE — 95251 PR GLUCOSE MONITOR, 72 HOUR, PHYS INTERP: ICD-10-PCS | Mod: S$GLB,,, | Performed by: NURSE PRACTITIONER

## 2023-08-01 PROCEDURE — 3051F PR MOST RECENT HEMOGLOBIN A1C LEVEL 7.0 - < 8.0%: ICD-10-PCS | Mod: CPTII,S$GLB,, | Performed by: NURSE PRACTITIONER

## 2023-08-01 PROCEDURE — 3288F FALL RISK ASSESSMENT DOCD: CPT | Mod: CPTII,S$GLB,, | Performed by: NURSE PRACTITIONER

## 2023-08-01 PROCEDURE — 99214 OFFICE O/P EST MOD 30 MIN: CPT | Mod: S$GLB,,, | Performed by: NURSE PRACTITIONER

## 2023-08-01 PROCEDURE — 3077F SYST BP >= 140 MM HG: CPT | Mod: CPTII,S$GLB,, | Performed by: NURSE PRACTITIONER

## 2023-08-01 PROCEDURE — 1101F PR PT FALLS ASSESS DOC 0-1 FALLS W/OUT INJ PAST YR: ICD-10-PCS | Mod: CPTII,S$GLB,, | Performed by: NURSE PRACTITIONER

## 2023-08-01 PROCEDURE — 99999 PR PBB SHADOW E&M-EST. PATIENT-LVL III: ICD-10-PCS | Mod: PBBFAC,,, | Performed by: NURSE PRACTITIONER

## 2023-08-01 PROCEDURE — 1125F AMNT PAIN NOTED PAIN PRSNT: CPT | Mod: CPTII,S$GLB,, | Performed by: NURSE PRACTITIONER

## 2023-08-01 PROCEDURE — 1160F RVW MEDS BY RX/DR IN RCRD: CPT | Mod: CPTII,S$GLB,, | Performed by: NURSE PRACTITIONER

## 2023-08-01 PROCEDURE — 95251 CONT GLUC MNTR ANALYSIS I&R: CPT | Mod: S$GLB,,, | Performed by: NURSE PRACTITIONER

## 2023-08-01 PROCEDURE — 1101F PT FALLS ASSESS-DOCD LE1/YR: CPT | Mod: CPTII,S$GLB,, | Performed by: NURSE PRACTITIONER

## 2023-08-01 PROCEDURE — 3079F DIAST BP 80-89 MM HG: CPT | Mod: CPTII,S$GLB,, | Performed by: NURSE PRACTITIONER

## 2023-08-01 PROCEDURE — 1160F PR REVIEW ALL MEDS BY PRESCRIBER/CLIN PHARMACIST DOCUMENTED: ICD-10-PCS | Mod: CPTII,S$GLB,, | Performed by: NURSE PRACTITIONER

## 2023-08-01 PROCEDURE — 1159F MED LIST DOCD IN RCRD: CPT | Mod: CPTII,S$GLB,, | Performed by: NURSE PRACTITIONER

## 2023-08-01 PROCEDURE — 1159F PR MEDICATION LIST DOCUMENTED IN MEDICAL RECORD: ICD-10-PCS | Mod: CPTII,S$GLB,, | Performed by: NURSE PRACTITIONER

## 2023-08-01 PROCEDURE — 3008F BODY MASS INDEX DOCD: CPT | Mod: CPTII,S$GLB,, | Performed by: NURSE PRACTITIONER

## 2023-08-01 PROCEDURE — 99214 PR OFFICE/OUTPT VISIT, EST, LEVL IV, 30-39 MIN: ICD-10-PCS | Mod: S$GLB,,, | Performed by: NURSE PRACTITIONER

## 2023-08-01 PROCEDURE — 3051F HG A1C>EQUAL 7.0%<8.0%: CPT | Mod: CPTII,S$GLB,, | Performed by: NURSE PRACTITIONER

## 2023-08-01 PROCEDURE — 3008F PR BODY MASS INDEX (BMI) DOCUMENTED: ICD-10-PCS | Mod: CPTII,S$GLB,, | Performed by: NURSE PRACTITIONER

## 2023-08-01 PROCEDURE — 3062F PR POS MACROALBUMINURIA RESULT DOCUMENTED/REVIEW: ICD-10-PCS | Mod: CPTII,S$GLB,, | Performed by: NURSE PRACTITIONER

## 2023-08-01 PROCEDURE — 3288F PR FALLS RISK ASSESSMENT DOCUMENTED: ICD-10-PCS | Mod: CPTII,S$GLB,, | Performed by: NURSE PRACTITIONER

## 2023-08-01 RX ORDER — INSULIN LISPRO-AABC 100 [IU]/ML
80 INJECTION, SOLUTION INTRAVENOUS; SUBCUTANEOUS CONTINUOUS
Qty: 30 ML | Refills: 6 | Status: ON HOLD | OUTPATIENT
Start: 2023-08-01 | End: 2023-10-17 | Stop reason: HOSPADM

## 2023-08-01 RX ORDER — GLUCAGON 3 MG/1
1 POWDER NASAL DAILY PRN
Qty: 2 EACH | Refills: 3 | Status: SHIPPED | OUTPATIENT
Start: 2023-08-01

## 2023-08-01 NOTE — PROGRESS NOTES
"67 y.o. gentleman, here for 6 week follow up for management of T1dm - natty.   Last seen 2023 - those notes below.     A1c is at 7.4% -   improving, last checked 2023 -   Continues on omnipod 5 with dexcom g6 - closed loop insulin pump system.   He gets his supplies from the pharmacy and fiasp vials of insulin -     Lots of stress recently - dog  last week/so very down about this.   Then 2nd pet passed away just 1 day later.   His inlaws have passed away recently/ is today so his wife is out of town to deal with this/family issues.   His right leg is still giving him pain. Vascular issues plus lymphedema.  His cellulitis is better - he had finished a 2nd course of doxyxcyline and followed with woundcare  He also had covid and his wife had covid in the interim - so just a really hard month for him.   He is still riding his motorcycles right now, thinking of selling them, also likes his corvet.     On omnipod 5 - see scanned in glooko report -   Total daily dose is 38.4 units/day   75% is coming from basal rate.   25% is coming from bolusing.   Active insulin time is 3 hours -   12am - 1.1 units/hour -   7am - 1.2 units/hour -   Carb ratio - 1 gram/1 unit (he puts in boluses to eat).   He gives 6 or 8 units for a meal - small or large meal.   (He is not giving/using correction boluses it seems for when his sugar is high) or if he skips/misses it.   Rarely will give 10 units.   ISF at 40 -   He sometimes doesn't have a basal rate going (per the report - as the pump/pod will /run out of insulin - and he can't hear very well - so doesn't hear the alarms). He fills his pod up all the way with 200 units    He is still using "presets" - I have him at 1: 1 -  Entering in 2 boluses on average per day - he usually waits until after he eats to bolus/it's hard to remember is all.     Dexcom g6 -   See scanned in  -   Average glucose is 228 -   33% time in range.   0% lows. " "  31% highs.   36% very highs.   He is notably higher it seems this week - stress with personal life is what he attributes this to.         Last visit notes from June 7th, 2023 -   67 y.o. gentleman, here for 3 month follow up for management of t1dm - ELOISE and history of pancreatitis   Last seen march 2023 - those notes below.     Today, was an add on visit - b/c he is concerned about infection or swelling in his legs.   He is feeling like legs are swollen, but actually "better" overall -   He had admission for cellulitis in February 2023 - and finished his antibiotics for this.   Had gone to the woundcare clinic in [place] in the past, but wants a referral to return back.   Usually wears BRANNON hose daily -   See photos below of his legs -   States he took an oral antibiotic about 6 weeks ago - finished the course.   Left leg is worse than the right.     A1c last in April was 8.1%. (that is improved from 8.9% last year).   He continues on omnipod - was on dash  and now is on omnipod 5 with dexcom g6   Using with lyumjev insulin.   He doesn't truly "carb count" - uses preset boluses - we have CR setting as 1:1 so he can accomplish this.   Often forgets to bolus or isn't always staying in automated mode.   His glucose will go high, and then he will go into manual mode and forget to go back into automated mode.     TDD - 28.3 units/day   88% is coming from basal rate.   12% is coming from bolusing.   74% of the day he stays in automated mode -   26% in manual mode. -   Entering in around 6.7 carbs per day (but this is presets - he is not doing carb counting).   Active insulin time is 4 hours.   12am - 1.1 units/hour -   7am - 1.2 units/hour -   Carb ratio set at 1: 10 -   Bg target set at 120 -   ISF set at 40   Bg correction 200 -   Max basal rate is 2 units/hour -       Dexcom g6 downloaded and reviewed today -   See scanned in .   Average glucose 199 -   31% highs.   21% very highs.   47% time in range. "   <1% lows.   0% very lows        Last seen in March 2023 -   67 y.o. male here for routine follow up for Type 1 -   Last seen august 2022 - those notes are below.     A1c still elevated at 8.9%   Currently on omnipod dash - began on pump from [employer] as of 8/17/2022 -   He changes about every 3 days.   Does not follow diabetic diet.     Pump downloaded and reviewed -   Total dose is 42.1 untis/day.   60% is coming from basal rate.   40% is coming from bolusing   2.5 boluses per day on average.   Active insulin time is 4 hours.   Basal rate is 1.1 units/hour.   ISF is at 50   Carb ratio 1: 15 - however he doesn't carb count -   Has bolus presets -   2 unit snack.   4 units small meal   6 unit medium meal.   8 units large meal.     Dexcom g6 - getting supplies from [place] [facility]   Average glucose 251 -   27% TIR   0% lows.   26% highs   47% very high.     He said that he's been told that he can get his dexcom from the pharmacy. So he wants me to send prescription there for him for his dexcom supplies.         Last visit notes from August 2022:   66 y.o. male here for 6 week follow up for management of T2dm - ELOISE - with low cpeptide levels in past.   He has been insulin dependent -   Last seen 6/29/222 - those notes below.     His a1c had gone up from 8.1% to 11.5%.   No new labs have been done for today's visit.   History of pancreatitis in 2018 - so he has not used  glp1s or dpp4's.   We have not used sglt2i's on him b/c of ELOISE status - but I am not opposed to using.   See below: cpeptide present, just low.   C-Peptide 0.78 - 5.19 ng/mL 0.67 Low     His Pelon is negative.     Continues on a vgo 30 and we had planned for him to get an omnipod to get better control of insulin needs/hyperglycemic episodes.   Reports giving still just 2 clicks with meals. He's scared to give too many clicks for fear of lows.   He has still not met with the diabetes educator to start on omnipod dash.   has his supplies - dash pods  "and pdm, and his lyumjev vial - he just not had made an appointment.   He continues to have hyperglycemia.     In the interim - he's suffered with kidney stones and is following with urology -   Also is following closely with woundcare, lymphedema in bilat. Lower extremities.       continues on Dexcom g6 - personal   downloaded personal and reviewed today -   Average glucose is 251 -   27% time in range.   <1% lows.   25% highs.   47% very highs.       Last visit notes as follows from 6/29/2022:   66 y.o. male here for 3 month follow up visit for management of T1dm -   ELOISE - formerly treated as type 2 -   History of pancreatitis.   Last seen in March 2022 - those notes are below.     a1c has been elevated in past, now extremely high   - up from 8.1% now to 11.5%   He's been very stressed since last visit -   Mother in law passed away - he's been very busy cleaning out her house.   Working a lot/sweating - states his vgo patch is just falling off.   He is nervous to be low/go low, so sometimes doesn't click with his meals.   Also got an infection in his left foot and in his calf in the interim - it has been treated.   But he isn't sure if his sugars were high prior and that's what caused the infection, or if the infection is what has caused his sugars to go high.     He is on vgo 30 still - using humalog insulin with it.   Usually gives 2 or 4 clicks with meals.   Often only "clicks" 1 or 2 times per day.   He denies any known low sugars, except for if he boluses "too much".   He is not following Ada diet.     Using dexcom g6 cgm - downloaded and reviewed today -   Using with his cellphone - see scanned in .     Average glucose 275 -   15% time in range.   0% lows.   23% highs.   61% very highs        Last visit notes as follows from march 2022:   66 y.o. gentleman, here for 3 month follow up visit oer routine for Eloise - T1dm -   Last seen in December 2021 - those notes are below.     a1c decreased from " 8.6% to 8.1%.   He increased from the vgo 20 to vgo 30 - still using humalog insulin   That has helped his overnight sugars, but he is still high during the day.   He often gives 4 clicks instead of 3 clicks with meals.   States sometimes he runs out of insulin in his patch if he gives too much.   We had discussed omnipod at his last visit, but he wanted to try the higher dose vgo patch.   He is not following Ada diet -     On dexcom G6 personal to check his sugars  Downloaded and reviewed today -   See scanned in  -   Average glucose 239 -   30% time in range.   <1% lows.   27% highs.   42% very highs.       Last visit notes from December 2021 -   66 y.o. T1dm - ELOISE - 3 month follow up.   Last seen in Septemer 2021 - those notes are velow.     His a1c remains elevated still @ 8.6%   Continues on VGO 20 -   States using same insulin - humalog -  4 clicks with meals.   No longer on metformin.   He has history of pancreatitis (2018) -  So cannot take glp1 or dpp4's -     He is not following ADA diet.   Eats out often, fried foods, high carb foods.   States he occasionally has lows when he gives boluses after he eats instead of before the meal.   He complains of right shoulder pain today -   Follows with Dr. Diaz - and is seeing him for kidney stone follow up.   Also c/o ED - has used viagra in past. Requests appt. With Dr. Diaz    Continues on Dexcom G6 - see scanned in .   Average glucose 258 -   24% time in range.   1% lows.   <1% very low. (states following a bolus/correction bolus).   26% highs.   48% extreme highs.       Last visit notes as follows from September 2021:   HPI: Jian Arrieta is a 67 y.o.  male c/I for visit to address Diabetes Type 1 (ELOISE adult onset)   This is the first time I am seeing him for care, follows with Dr. Leon Barajas,  for primary care needs.   Has seen endocrinology - Luisa Garcia NP in past - last visit was 8/9/2021 - those notes are  below.  Had seen Dr. Carney and dr. Stauffer with endocrinology in past prior visits.     he is establishing care with me today however.   Met last with GINO Machado 8/23/2021 -     was diagnosed with T2DM originally in 2016 -   He has history of pancreatitis in 2018 - portal vein thrombosis and underwent angioplasty.   now treated as T1 - ELOISE -   Had gallbladder removed as well.     Is on insulin only now via vgo patch - on VGO 20    Has never been hospitalized r/t DM.  Denies missing doses of DM medication - however admits he misses he meal time dose of insulin.     Hgba1c has increased from 7.7%  to 8.6%- now improved to 8.3% currently.   Not always following ADA diet. Eats out 2 - 3 times per day.   Local restaurants/pastas/fried food/waffle house.   vgo 20 - 2 - 3 clicks with breakfast.   4 clicks with lunch and dinner.   1 click with a snack.     He reports forgetting to click sometimes - and waits to click after meals.   Also fearful of hypoglycemia - so will under click.   Working in yard/increased activity or sweating - has low blood sugars.   He has baqsimi to correct     On dexcom g6 for checking sugars - downloaded and reviewed today -   Average glucose 243  32% time in range.   0% lows.   24% highs.   44% extreme highs.     Complications from diabetes and pertinent co-morbidities include:   Negative for DKA.   Has been on insulin for several years.   + for diabetic neuropathy.   + for nephropathy - CKD stage 3   + microalbuminuria.   Eyes:  negative for Diabetic retinopathy   CV: Denies history of MI nor stroke.   CAD: yes and history of CABG   Also heart failure history   Takes aspirin 81mg tablet daily  BP: has history of HTN  Statin: Taking  ACE/ARB: Not taking    Last notes from RADHA Garcia from August 2021 as follows:   Pt returns for follow up of type 2 diabetes complicated by Charcot foot, foot ulcer-now resolved, kidney stone, BMI 39, CKD stage III, s/p sleeve gastrectomy that is uncontrolled  and complicated.  Previously seen by Dr. Carney and Dr. Stauffer and myself. Last visit in April 2021.   He has dentures!!    He had hydrocele repair on 6/16/2021 -- also had kidney stones at the time. Had stent placed and removed. States he has kidney stones on the left and will be seeing Dr. Diaz.      With regards to the diabetes:   Diagnosed with Type 2 DM in 2016  Episode of pancreatitis around 2018,   Family History of Type 2 DM: none  Known complications:   DKA/HHS: none  RN: none; 6/10/21 -  PN: +; was seeing podiatry in the past  Nephropathy: + sees nephrology-- needs appt  Gastroparesis: none  CAD: CABG around 2016  Diabetes complications: CKD stage III, s/p sleeve gastrectomy   Current regimen:  Vgo 20 4 clicks with oatmeal and 3 clicks with breakfast; 4 clicks with lunch and dinner; 1-2 clicks with a snack; sometimes more for a snack   He is not running out of clicks at the end of the day.    He is sometimes taking Vgo off overnight to prevent hypoglycemia. States he is sometimes having hypoglycemia between 1-4AM. States he started doing this a couple of months ago.Takes off around 9-10PM and wakes up at 4-5AM and puts new Vgo on at 6AM. He is eating around 5-6 AM and puts on Vgo after he eats. In the past he was wearing Vgo for over 24 hours, still doing this.    He is fearful of hypoglycemia and overcorrecting.   Missed doses-missing clicks as above   Other medications tried:  Metformin   He is checking BG 4 times daily  Wearing Dexcom today. See media tab for report  His blood sugars are elevated after breakfast and throughout the day. He is putting on a new device sometimes after he eats breakfast which contributes to his hyperglycemia. He is having hyperglycemia at times overnight and he will take off Vgo.   He forgetting to click at times or clicking after he eats.  On days that he forgets to click, his blood sugars stay persistently high. His blood sugars come down nicely when he clicks.    He reports  Dexcom and Vgo is falling off due to sweating badly.   Fasting:    Patient feels unwell with blood sugars less than 110. He loses ability to function with low blood sugars. His lowest blood sugar has been in the 40s (around 2020). Seen at [facility].    Dietary recall: He is not following diabetic diet. Appetite is good. Does not eat as much as before.   Eats 3 meals a day.   B: 5AM-waffle house or oatmeal or grits  L: 11:30  D: 7PM  Snacks: Grazing during the day  Drinks: tea and water    Exercise - tried to stay active.  Active in the yard, garage.    Education - last visit: 2021 -EZEQUIEL Palacios/[place]: has    Social History     Tobacco Use   Smoking Status Former    Current packs/day: 0.00    Average packs/day: 2.0 packs/day for 30.0 years (60.0 ttl pk-yrs)    Types: Cigarettes    Start date: 1975    Quit date: 2005    Years since quittin.5   Smokeless Tobacco Never     Past medical History:   Past Medical History:   Diagnosis Date    Alcohol abuse     Anasarca 2019    Anemia     Anticoagulant long-term use     Arthropathy associated with neurological disorder 2015    Atherosclerosis     Charcot foot due to diabetes mellitus     Chronic combined systolic and diastolic heart failure 2019 Left VentricleModerate decreased ejection fraction at 30%. Normal left ventricular cavity size. Normal wall thickness observed. Grade I (mild) left ventricular diastolic dysfunction consistent with impaired relaxation. Normal left atrial pressure. Moderate, global hypokinesis(see wall scoring diagram). Right VentricleNormal cavity size, wall thickness and ejection fraction. Wall motion n    Chronic pancreatitis 2019    CKD (chronic kidney disease) stage 3, GFR 30-59 ml/min     CKD (chronic kidney disease) stage 4, GFR 15-29 ml/min     Colon polyps     approx 5 yrs ago    Coronary artery disease due to calcified coronary lesion 2015    5 stents on ASA       Diabetic polyneuropathy associated with type 2 diabetes mellitus 9/2/2015    Diverticulosis 1/28/2019    DM type 2 with diabetic peripheral neuropathy 2/4/2019    Encounter for blood transfusion     Essential hypertension 1/28/2019    Former smoker 8/26/2015    Healed ulcer of left foot on examination 6/20/2017    Hematuria     Hydrocele     approx 1.5 yrs ago    Hyperphosphatemia     Hypoalbuminemia 2/4/2019    Hypocalcemia     Lymphedema of both lower extremities 1/29/2019    Mixed hyperlipidemia 5/8/2015    Morbid obesity with BMI of 50.0-59.9, adult 5/8/2015    Obstruction of right ureteropelvic junction (UPJ) due to stone 5/24/2021    Onychomycosis of multiple toenails with type 2 diabetes mellitus and peripheral neuropathy 6/20/2017    Perianal cyst     approx 2 yrs ago    Proteinuria     Pseudocyst of pancreas 1/28/2019 1-28-19 Liver has a cirrhotic morphology with no focal lesions.  Significant interval increase in ascites when compared to prior exam which may account for patient's abdominal distension.  Hypodense air-fluid collection along the body of the pancreas which is slightly smaller when compared to prior CT.  Findings may relate to pancreatic necrosis with pancreatic pseudocysts with infected pseudocyst    Skin cancer     skin cancer    Sleep apnea 8/26/2015    Status post bariatric surgery 1/11/2016    Type 2 diabetes mellitus, with long-term current use of insulin 5/8/2015    Urinary tract infection       Family hx:   Family History   Problem Relation Age of Onset    Cancer Mother     Cancer Father     Heart disease Father     Obesity Sister     Parkinsonism Brother     No Known Problems Paternal Grandmother     Cancer Paternal Grandfather     Cancer Brother     Diabetes Maternal Grandmother     Stroke Maternal Grandfather     Cirrhosis Neg Hx       Current meds:   Current Outpatient Medications:     blood-glucose sensor (DEXCOM G6 SENSOR) Sandi, Inject 1 each into the skin every 10 days., Disp:  "3 each, Rfl: 11    blood-glucose transmitter (DEXCOM G6 TRANSMITTER) Sandi, Inject 1 each into the skin every 10 days., Disp: 1 each, Rfl: 3    bumetanide (BUMEX) 1 MG tablet, TAKE 1 TABLET (1 MG TOTAL) BY MOUTH BEFORE BREAKFAST AND 1/2 TABLET (0.5 MG TOTAL) EVERY EVENING., Disp: 135 tablet, Rfl: 3    HUMALOG U-100 INSULIN 100 unit/mL injection, , Disp: , Rfl:     insulin glargine, TOUJEO, (TOUJEO SOLOSTAR U-300 INSULIN) 300 unit/mL (1.5 mL) InPn pen, Inject 30 Units into the skin once daily. Can increase dose by 2 units higher to get to goal fasting glucose 100 - 150 - for max TDD 60 units. Takes daily IN AN EMERGENCY only if OFF OF insulin pump., Disp: 15 mL, Rfl: 1    insulin pump cart,automated,BT (OMNIPOD 5 G6 PODS, GEN 5,) Crtg, Inject 1 each into the skin every other day. Use with omnipod 5 pdm as directed., Disp: 15 each, Rfl: 11    pen needle, diabetic 32 gauge x 5/32" Ndle, Use with toujeo insulin one daily only as Emergency use/back up insulin - if OFF OF your insulin pump., Disp: 30 each, Rfl: 3    aspirin (ECOTRIN) 81 MG EC tablet, Take 1 tablet (81 mg total) by mouth once daily., Disp: 9 tablet, Rfl: 0    glucagon (BAQSIMI) 3 mg/actuation Spry, 1 each by Nasal route daily as needed (if glucose is less than 70 - can repeat in 1 hour if still low/less than 70)., Disp: 2 each, Rfl: 3    insulin lispro-aabc (LYUMJEV U-100 INSULIN) 100 unit/mL, Inject 80 Units into the skin continuous. USES IN OMNIPOD INSULIN PUMP. USE AS DIRECTED. DO NOT DIRECTLY INJECT., Disp: 30 mL, Rfl: 6     Current Diabetes medications:   Omnipod 5 - rate is at 1.1 units/hour.   1.2 units/hour during the day  Doesn't count carbs - 1: 1 ratio.   lyumjev insulin -     Medications Tried and Failed:   Metformin  Hx pancreatitis - cannot use dpp4 or glp1's.   VGO 30 patch - (was on a vgo 20) - giving around 4 clicks with meals.   Humalog - then switched to lyumjev.     Social:   Lives at home with: wife  Life changes/stressors currently. " "Did mention his mother in law passed away recently of stroke. Helping clean out her house. Also suffered recent foot and left leg infection.   Diet: not following ADA diet   Meals: 3 per day and snacks.        Breakfast - waffle house special - eggs, biscuit, cheese, grits, hashbrowns.        Lunch - eats out/pastas/rice - red bean plates, etc.        Dinner - eats out every night - [employer]        Snacks 1 - 2 times per day.   Exercise: nothing formally - but works in yard/garage.    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     Glucose Monitoring:   Checking sugars 4x/day via Dexcom g6 - see scanned in .   Gets supplies via mail - has Prior Knowledge. Now has changed to [employer].     Standards of care:   Eyes: .: 06/10/2021  Foot exam: : 09/21/2021   Diabetes education: yes - and for vgo start and omnipod dash start.     Vital Signs  BP (!) 140/88 (BP Location: Right arm, Patient Position: Sitting, BP Method: Medium (Manual))   Pulse 75   Temp 98.1 °F (36.7 °C) (Temporal)   Resp 15   Ht 5' 7" (1.702 m)   Wt 120.9 kg (266 lb 8.6 oz)   SpO2 98%   BMI 41.75 kg/m²     Pertinent Labs:   Hgba1c   Lab Results   Component Value Date    HGBA1C 7.4 (H) 07/06/2023    HGBA1C 8.1 (H) 04/06/2023    HGBA1C 8.9 (H) 03/08/2023     Lipid panel   Lab Results   Component Value Date    CHOL 152 04/06/2023    CHOL 155 03/15/2022    CHOL 137 12/16/2021     Lab Results   Component Value Date    HDL 62 04/06/2023    HDL 65 03/15/2022    HDL 55 12/16/2021     Lab Results   Component Value Date    LDLCALC 79.4 04/06/2023    LDLCALC 77.2 03/15/2022    LDLCALC 70.8 12/16/2021     Lab Results   Component Value Date    TRIG 53 04/06/2023    TRIG 64 03/15/2022    TRIG 56 12/16/2021     Lab Results   Component Value Date    CHOLHDL 40.8 04/06/2023    CHOLHDL 41.9 03/15/2022    CHOLHDL 40.1 12/16/2021      CMP  Glucose   Date Value Ref Range Status   07/07/2023 121 (H) 70 - 110 mg/dL Final     BUN   Date " Value Ref Range Status   07/07/2023 26 (H) 8 - 23 mg/dL Final     Creatinine   Date Value Ref Range Status   07/07/2023 2.4 (H) 0.5 - 1.4 mg/dL Final     eGFR if    Date Value Ref Range Status   06/22/2022 44.0 (A) >60 mL/min/1.73 m^2 Final     eGFR if non    Date Value Ref Range Status   06/22/2022 38.1 (A) >60 mL/min/1.73 m^2 Final     Comment:     Calculation used to obtain the estimated glomerular filtration  rate (eGFR) is the CKD-EPI equation.        AST   Date Value Ref Range Status   07/07/2023 58 (H) 10 - 40 U/L Final     ALT   Date Value Ref Range Status   07/07/2023 47 (H) 10 - 44 U/L Final     Microalbumin creatinine ratio:   Lab Results   Component Value Date    MICALBCREAT 3749.6 (H) 03/08/2023       Review Of Systems:   Gen: Appetite good, no weight gain or loss, + fatigue, Denies polydipsia.  Skin: Skin is intact and heals well, denies any rashes or hair changes.   EENT: Denies any acute visual disturbances, nor blurred vision.   Resp: Denies SOB or Dyspnea on exertion, denies cough.   Cardiac: Denies chest pain, palpitations, + 2 edema to bilat. Lower ext. Recent cellulitis infection. Now weeping. - worse on the left leg. Improving over past couple weeks - wearing BRANNON hose./   GI: Denies abdominal pain, nausea or vomiting, diarrhea, or constipation.   /GYN: Denies nocturia, nor burning, frequency or pain on urination. + c/o ED still.   MS/Neuro: Denies numbness/ tingling in BLE; Gait unsteady, speech clear  Psych: Denies drug/ETOH abuse, no hx of depression. Sadness/loss of pets x 2 and inlaws passed away/increased stress at home.   Other systems: negative.    Physical Exam:   GENERAL: Well developed, well nourished in appearance.   PSYCH: AAOx3, appropriate mood and affect, pleasant expression, conversant, appears relaxed, well groomed.   EYES: PERRL, Conjunctiva and corneas clear  NECK: Soft and Supple, trachea midline  CHEST: Even, regular, and unlabored  respirations  ABDOMEN: Soft, non-tender, non-distended.   VASCULAR: pedal pulses palpable bilaterally,+ edema dependent bilaterally. The left leg is larger circumference still than the right.   NEURO:  cranial nerves II - XII intact   MUSCULOSKELETAL: Good ROM, steady gait.   SKIN: Skin warm, dry, and intact - no further drainage noted.    Assessment and Plan of Care:     Jian was seen today for diabetes.    Diagnoses and all orders for this visit:    Type 1 diabetes mellitus with hyperglycemia  -     insulin lispro-aabc (LYUMJEV U-100 INSULIN) 100 unit/mL; Inject 80 Units into the skin continuous. USES IN OMNIPOD INSULIN PUMP. USE AS DIRECTED. DO NOT DIRECTLY INJECT.  -     glucagon (BAQSIMI) 3 mg/actuation Spry; 1 each by Nasal route daily as needed (if glucose is less than 70 - can repeat in 1 hour if still low/less than 70).  -     Hemoglobin A1C; Future  -     Microalbumin/Creatinine Ratio, Urine; Future  -     Comprehensive Metabolic Panel; Future    Hypertension associated with diabetes    Hyperlipidemia associated with type 2 diabetes mellitus  -     Lipid Panel; Future    Venous insufficiency of both lower extremities    Onychomycosis of multiple toenails with type 2 diabetes mellitus and peripheral neuropathy    Diabetic polyneuropathy associated with type 2 diabetes mellitus    Status post bariatric surgery    Type 2 diabetes mellitus with diabetic neuropathy, with long-term current use of insulin    Lymphedema of both lower extremities    Sleep apnea, unspecified type    Other chronic pancreatitis    Type 2 diabetes mellitus with stage 3 chronic kidney disease, with long-term current use of insulin, unspecified whether stage 3a or 3b CKD    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 T1 - ELOISE - formerly treated as T2DM with hyperglycemia- Hgba1c goal is 7.5% or  less without hypoglycemia - 7.6%---> 8.6%--> 8.3%--> 8.6% --> 8.1%  --> 11. 5% --> 8.3%--->8.9%--> 8.1% --> 7.4% currently.  Improving on closed loop pump system.   discussed DM, progression of disease, long term complications, CV risk factors and tx options.   Advise compliance with ADA diet and encourage exercise- encourage proper food intake/but he likes to eat out a lot.   Continue on omnipod 5- lyumjev.   Automated mode. Try to bolus/not miss and Stay in automoated mode. Reviewed how to give the correction boluses -   Encouraged to bolus before meals not after.   No change in rates.   Decreased bg target from 120 to 110. To help get him closer to target range.   Decreased active insulin time from 4 hours to 3 hours at last visit - no changes today.   Currently staying in automated mode - needs to bolus more. Enouraged for all meals and snacks.   RX sent for toujeo =- 30 unitsonce daily as back up insulin pen for emergency use.   Had prescribed farxiga - but he never started - will leave off for now. This could be a risk for him -   His renal function is too fluid/unpredictable.   Discussed MOA, possible side effects including yeast infection, UTI, dehydration and ketoacidosis, importance of maintaining hydration and avoiding No carb diets. Good use hygiene. Notify my office if any side effects. Will monitor renal function closely. Stable at present.   Reviewed  hypoglycemia, s/s and appropriate tx. Have/get quick acting glucose tablets at hand.   Instructed to monitor Blood glucose 2 - 4x/day and bring meter/ log to every clinic visit.   Continue on dexcom G6 personal cgm. Gets from pharmacy.   A CGM is MEDICALLY NECESSARY as it will allow me to virtually and more closely monitor glucose readings  This enables me to make any necessary adjustments to the patients diabetes regimen while keeping the patient and staff safe during the COVID-19 PHE.    2. HTN & Heart failure - usually controlled, did not take meds  yet today -    continue meds as previously prescribed and monitor.   + urine mac  History of afib.   + history of CAD and hx cabg, as well as venous insufficiency bilat. Lower extremeties.   Last saw cardiology - Dr. Saavedra on 6/14/22.      3. Lipids- LDL goal < 100. At goal.   Currently on statin therapy    4. Weight - BMI Body mass index is 41.75 kg/m².   Encourage Ada diet and exercise as tolerated.   Cannot do glp1 b/c of history of pancreatitis. I will consider it though in future considering his high risk profile.   Has had bariatric surgery prior.     5. Renal Function - ckd - stage 3 -   + microalbuminuria. No changes.     6,. Recent left leg and foot infection -   Suspect recurrent cellulitis -   Needs to be treated -   healing/likely r/t hyperglycemia.    hx venous insufficiency and lymphedema.   Referral placed for woundcare clinic placed today - to re-evaluation.   Finished another course of doxy bid x 10 days-   \  7. hx fatty liver disease, cirrhosis, and resultatnt - pancreatitis - has followed with liver -   Right now stable trends -       Labs and OV in 3 months time.

## 2023-08-01 NOTE — PATIENT INSTRUCTIONS
"Continue on omnipod 5 insulin pump -   Try to stay in automated mode -   Keep it connected to dexcom g6 -     Bolus before meals - not after.     You can give a bolus if you forget and use the bolus calculator - populate your blood sugar into the PDM using your cgm #/dexcom to have the omnipod estimate how much insulin you need to 'correct" your blood sugar.     For low blood sugar - remember to keep glucose tablets on hand. You can purchase these at the pharmacy check out desk/over the counter.   If blood sugar is less than 70, take/eat 2 - 3 tablets quickly to bring your sugar up.   Always keep 15 grams of Quick acting carbohydrates on hand to eat/drink if your sugar is low - examples are 1/2 cup of juice, coke, or crackers, granola bars.   Remember to eat meals frequently to prevent low blood blood sugars.      Please remember to change insulin pump set/pump site, refill reservoir every 3 days according to  instructions. Longer duration between pump site changes can result in high blood glucose.     Please bolus insulin 15 minutes before you eat meals to avoid spikes and lows in blood glucose.     Please bolus with every snack outside of daily meals.     If you are having sensor issues, please check glucose by finger sticks and input the glucose levels into the pump.     Contact the supplier if you are having sensors or pump issues for troubleshooting and additional supplies.              Pump Back Up Plan:   1.  In case you are not certain the pump or tubing is working correctly, use this plan.  2.  Stop the pump and disconnect the tubing and insertion set.  3.  If you will be off the pump for more than 1-2 hours without a basal rate, you must correct with Novolog or switch to a long acting insulin plan.    4.  If the insulin pump is non functional and discontinued for anticipated more than 20 hours, please give daily injections of:  Long acting insulin (Toujeo 30 units daily) as prescribed   Short " "acting insulin for meals 4, 6 or 8 units before meals.   5.  When the insulin pump is restarted, do not restart basal rates until at least 22 hours after the last long acting insulin injection. You can set a 0% temporary basal setting that will last until this time and use your pump to bolus for meals and correction.  6.  If glucose continues to rise, or no fresh backup insulin is available, or unable to perform the above instructions. Please be evaluated in the ED.        For any technical insulin pump issues, Contact the insulin pump company representative to troubleshoot the problems. (Help numbers are on the back of the pump device)               Hypoglycemia Treatment - The "15-15 Rule".     If you experience an episode of hypoglycemia (glucose less than 70), follow the 15-15 rule.     Have 15 grams of carbohydrate to raise your blood sugar and recheck it after 15 minutes. If its still below 70 mg/dL, have another serving.     Repeat these steps until your blood sugar is at least 70 mg/dL. Once your blood sugar is back to normal, eat a meal or snack to make sure it doesnt lower again.     This may be:     4 ounces (1/2 cup) of juice or regular soda (not diet)  1 tablespoon of sugar, honey, or corn syrup  Hard candies, jellybeans, or gumdrops--see food label for how many to consume  Make a note about any episodes of low blood sugar and talk with your health care team about why it happened. They can suggest ways to avoid low blood sugar in the future.      Many people tend to want to eat as much as they can until they feel better. This can cause blood sugar levels to shoot way up. Using the step-wise approach of the "15-15 Rule" can help you avoid this, preventing high blood sugar levels.     Note:  When treating a low, the choice of carbohydrate source is important. Complex carbohydrates, or foods that contain fats along with carbs (like chocolate) can slow the absorption of glucose and should not be used to " treat an emergency low.     For more information, visit:  [email or web address]-[email or web address]            Please notify your physician or me if  you have persistent low blood glucose <70 or persistent elevated glucose >250 as soon as possible in addition to the steps suggested above.

## 2023-08-01 NOTE — PATIENT INSTRUCTIONS
LYMPHEDEMA    What is Lymphedema  Swelling in an arm, leg, the neck, the face, genitals and/or abdomen caused by a blockage in the lymphatic system. This type of swelling can decrease the amount of oxygen that reaches tissues and can also promote infection by serving as a medium in which bacteria may grow. An example of an infection seen commonly in those with Lymphedema is Cellulitis.  It is important to know that lymphedema is progressive and can lead to long-term, chronic conditions. Therefore, early and careful management is needed to reduce symptoms.    The Lymphatic System  A network of tissues and organs that help rid the body of toxins, waste and other unwanted materials. The lymphatic system returns fluid and protein to the circulatory system from the spaces in the body's tissues. Tiny lymph vessels of the lymphatic system  materials and waste products, along with foreign materials (bacteria) and transport them to larger lymph vessels. This fluid inside the vessels is called lymph. Surrounding muscles help move the lymph along to the larger vessels until it is returned to the circulatory system. Lymph nodes help filter the lymph fluid and break down foreign substances and help fight infection.                                                          What Causes Lymphedema?  Some people are born with an underdeveloped lymph system. Swelling may present at birth or develop during adolescence or adulthood. This is known as primary lymphedema. Secondary lymphedema may occur after lymph tissue and lymph nodes are injured or removed. This swelling may begin immediately or may not occur for several months or years. A common cause of secondary lymphedema is radiation treatment. Other causes of secondary lymphedema are surgery (removal of lymph nodes), cancer, infection and trauma. While there is presently no cure for lymphedema, it can be managed with diligent care. The earlier the stage, the easier the  management of the affected area.    Stages of Lymphedema:  Stage 0 (pre-stage): A subclinical state where swelling is not obvious. You may have complaints such as heaviness in the limb or mild aching or tightness  Stage 1: Skin of the limb is typically soft with pitting edema. Elevation of the limb may improve the swelling  Stage 2: Skin of the limb is more fibrotic. The skin may no longer pit when pressed. Limb swelling does not improve with elevation.   Stage 3: Severe stage also known as elephantiasis. Skin is fibrotic with deep skin creases which are prone to infection. Trophic skin changes, such as papillomatosis and hyperkeratosis also occur in this stage.                                                                             Precautions:  It is important to prevent skin irritation, cuts, scrapes and needle sticks in the skin to decrease risk of infection  Wear gloves for gardening and housework  Use an electric razor rather than a blade razor  Have injections and blood draws from the uninvolved side  Use a thimble when sewing  Avoid rubbing, scrubbing, waxing of involved limb  Take care to avoid bites and scratches from bugs and pets  Avoid walking barefoot  Use extreme caution when peeling shrimp, crawfish, crabs      It is important to avoid extreme heat exposure. When you increase circulation to a swollen limb, the chance of additional fluid leaking into the spaces of your body's tissue increases, which causes more swelling.  Avoid prolonged exposure to sunlight  Use sunscreen when outdoors  Use oven mitts when reaching into an oven  Avoid hot tubs and saunas  Avoid spending prolonged time under a hot hair dryer  Look for clothing made of breathable fabrics for hot weather      It is important to avoid anything that will reduce circulation of blood flow, since reduction of blood flow can lead to a restriction of lymph flow.  Have blood pressure measured in the uninvolved limb  Avoid tight or  constricting clothing such as tight sleeves and waistbands  Avoid tight watches and jewelry  Avoid sitting with your legs crossed        In general, practice healthy living habits:  Avoid alcohol and smoking  Maintain your ideal weight  Keep sodium intake at a moderate level (less than 2,400 mg/day)  Eat moderate amount of protein to maintain tissue health. Contact your primary doctor for a referral to [facility]  When you travel by air, wear your compression garment during the flight  Wash hands frequently and dry thoroughly  Keep skin moisturized and free from cracking or peeling. For sensitive skin, Eucerin lotion is a good option.   Avoid hangnails and do not pull or bite cuticles  Take care of all scratches, cuts, bites, immediately with an antibiotic cream or ointment (Neosporin, Bacitracin, Triple ABX) and cover with a clean bandage.       Call your doctor as soon as you suspect infection. Be aware of the signs of infection:  Increased swelling  Redness (generalized or localized small, red dots)  Heat (arm/leg feels warm, or you have fever)  Pain       MANAGING YOUR BANDAGES:  Although your bandages are inconvenient, they are an important part of the lymphedema program. You will receive one set of bandages for participating in [facility]. It is important to take care of these bandages during your treatment. You should not get your bandages wet! For bandages on the arm, it is possible to protect the arm bandages with a plastic bag when in a tub. No Showers with bandages on arm or leg!          WHAT TO EXPECT DURING LYMPHEDEMA TREATMENT:  Wear loose short sleeves for arm treatment. Sleeveless tops work well too. Wear skirts or baggy shorts or loose fitting/baggy pants for leg treatment.  If your leg(s) are being bandaged, find the widest shoe(s) possible. Wide slip-on shoes like crocks usually work. If you do not have a shoe that will fit over the bandages, a  disposable shoe cover will be provided to you.  Treatment sessions will last 45 minutes to 60 minutes and will consist of Manual Lymphatic Drainage (MLD), MLD training, vasopneumatic compression of the extremity as needed and bandaging of the swollen extremity.   You may be asked to remove clothes so that treatments can be performed efficiently. You will be provided with a sheet to drape yourself.   Once the compression bandage is applied, you will be expected to wear the bandage until the next visit. The bandage will loosen as the lymph fluid is pushed out of the limb. Do not attempt to unwrap and re-wrap the limb unless you have been trained to do so. In the event that the bandages are so loose that they fall off, remove them and put everything in a bag and bring it to the next therapy session.  While wearing the bandages on the arm or leg, keep fingers and toes moving, bend your elbow/knee, wrist/ankle frequently. Elevate the limb above the heart to relieve any throbbing or discomfort.  If the discomfort is unbearable, you may try to remove the outermost bandage. If this does not work, remove all bandages and bring them with you to the next therapy session.   Depending on severity of the swelling, expect weekly treatment for 2-5 days per week x 4-6 weeks.  An exercise program will be created based on individual need.  You will be fitted for a permanent compression garment (sleeve for arm/stocking for leg) prior to discharge from therapy. This permanent compression garment will take the place of the bandages and need to be worn as prescribed by your therapist.    CARE OF YOUR COMPRESSION GARMENT  Wear the compression garment daily as prescribed by your therapist. You will not wear the compression garment when sleeping. Put on the garment soon after you wake up and remove it before going to bed. This will decrease the risk of the lymph fluid returning to the extremity.   Use donning gloves/garden gloves to  the  compression garment. This will decrease tearing the material and make it easier to  the material.  If you feel the garment is too tight, notify your therapist.  It is best to have a wear one, wash one garment if possible. Check with your insurance provided to see what they will cover for lymphedema supplies.  Compression garments can be expected to last 3-6 months before they need to be replaced. When the compression garment loses its compressive ability, swelling from lymph fluid can return in the limb even if you are wearing the garment daily.   Consult your therapist or compression representative in the event you need to be re-measured.     Exercise:    Your therapist will instruct you in an exercise program tailored to you. Muscle contractions help promote the flow of lymph out of the swollen limb.  To prevent an increase in swelling of the limb, it is always best practice to wear your compression garment on the affected extremity when exercising.    **IF YOUR ARM IS BANDAGED, OPEN/CLOSE YOUR HAND AND PERFORM WRIST CIRCLES THROUGHOUT THE DAY.    Perform all exercises below with good, erect posture. Stand with feet shoulder width apart, with your knees slightly bent. Repeat each exercise 10-20 reps up to 3x/week               Shoulder Blade Squeeze      Backwards Shoulder Roll  Neck Rotation    Neck Side Bends    Chest Press with Stick    Elbow Bends with Stick      Ankle Pumps (and wiggle toes; do frequently)    Hamstring Curls    Heel Raises  Knee Extension    Knee Bends    Lunges  Knee Raises    Trunk Rotation    The following are a list of resources that may be helpful for you.  Lymphnotes.com: online information source for those having or are at risk of developing Lymphedema. This site is also for family and friend's who care for those with lymphedema  NLN (National Lymphedema Network): an organization dedicated to promoting the awareness of lymphedema and empowering people with lymphedema to live life  to the fullest. Lymphnet.org  LEARN (Lymphedema Education and Research Network): answers to frequently asked questions about Lymphedema, Lipedema, and the lymphatic system. Lymphaticnetwork.org  How Isabelle Got her Rack Back, A Breast Cancer Memoir by Mary Dahl - a book that gives a laugh out loud humor to her adventure, along with poignant moments of self-discovery as she blogs her way to good health. (available on [employer] and paperback)  100 Questions and Answers About Lymphedema by Lauryn Pool, Stephanie Joiner, and Deandra Moscoso - provides clear, straightforward answers to your questions about lymphedema. (available on paperback)  Podcasts: Lymphedema Podcast, Livedema Podcast, Lymph Logic to name a few      If you need further assistance or have questions concerning your treatment, please do not hesitate to call Luisa Thompson (therapist) at  [phone] (phone number).

## 2023-08-01 NOTE — TELEPHONE ENCOUNTER
I spoke w/ the patient and reviewed his US with him.  He has appt with his PCP today.  He also went to a lymphedema clinic.  Re discussed elevation and compression.  All questions answered.

## 2023-08-01 NOTE — PLAN OF CARE
[facility]  Physical Therapy Initial Evaluation    Name: Jian JASMINE Meenakshi  [facility] Number: [id number]    Therapy Diagnosis:   Encounter Diagnoses   Name Primary?    Lymphedema of both lower extremities     Cellulitis of lower extremity, unspecified laterality     Venous insufficiency of both lower extremities Yes    Type 2 diabetes mellitus with stage 3 chronic kidney disease, with long-term current use of insulin, unspecified whether stage 3a or 3b CKD      Physician: Savanah Felton DPM    Physician Orders: PT Eval and Treat - lymphedema  Medical Diagnosis from Referral: I89.0 (ICD-10-CM) - Lymphedema of both lower extremities    L03.119 (ICD-10-CM) - Cellulitis of lower extremity, unspecified laterality     Evaluation Date: 7/31/2023  Authorization Period Expiration: 6/19/2023  Plan of Care Expiration: 9/10/2023  Visit # / Visits authorized: 1/ 1    Time In: 10:00am  Time Out: 11:00am  Total Billable Time: 60 minutes    Precautions: Standard and Diabetes    Subjective   Date of onset: years   History of current condition - Jian reports: He has been getting lymph therapy done at  but he left that hospital.  He saw a difference when he did the therapy. He gets wounds very easily.     Pt denies CHF, KF, DM and CA.   Pt reports CHF, CKD, DM, and skin cancer   Fluid pill: No  Blood thinner: No     Medical History:   Past Medical History:   Diagnosis Date    Alcohol abuse     Anasarca 1/28/2019    Anemia     Anticoagulant long-term use     Arthropathy associated with neurological disorder 9/2/2015    Atherosclerosis     Charcot foot due to diabetes mellitus     Chronic combined systolic and diastolic heart failure 01/29/2019 1-28-19 Left VentricleModerate decreased ejection fraction at 30%. Normal left ventricular cavity size. Normal wall thickness observed. Grade I (mild) left ventricular diastolic dysfunction consistent with impaired relaxation. Normal left atrial pressure. Moderate,  global hypokinesis(see wall scoring diagram). Right VentricleNormal cavity size, wall thickness and ejection fraction. Wall motion n    Chronic pancreatitis 1/28/2019    CKD (chronic kidney disease) stage 3, GFR 30-59 ml/min     CKD (chronic kidney disease) stage 4, GFR 15-29 ml/min     Colon polyps     approx 5 yrs ago    Coronary artery disease due to calcified coronary lesion 05/08/2015    5 stents on ASA      Diabetic polyneuropathy associated with type 2 diabetes mellitus 9/2/2015    Diverticulosis 1/28/2019    DM type 2 with diabetic peripheral neuropathy 2/4/2019    Encounter for blood transfusion     Essential hypertension 1/28/2019    Former smoker 8/26/2015    Healed ulcer of left foot on examination 6/20/2017    Hematuria     Hydrocele     approx 1.5 yrs ago    Hyperphosphatemia     Hypoalbuminemia 2/4/2019    Hypocalcemia     Lymphedema of both lower extremities 1/29/2019    Mixed hyperlipidemia 5/8/2015    Morbid obesity with BMI of 50.0-59.9, adult 5/8/2015    Obstruction of right ureteropelvic junction (UPJ) due to stone 5/24/2021    Onychomycosis of multiple toenails with type 2 diabetes mellitus and peripheral neuropathy 6/20/2017    Perianal cyst     approx 2 yrs ago    Proteinuria     Pseudocyst of pancreas 1/28/2019 1-28-19 Liver has a cirrhotic morphology with no focal lesions.  Significant interval increase in ascites when compared to prior exam which may account for patient's abdominal distension.  Hypodense air-fluid collection along the body of the pancreas which is slightly smaller when compared to prior CT.  Findings may relate to pancreatic necrosis with pancreatic pseudocysts with infected pseudocyst    Skin cancer     skin cancer    Sleep apnea 8/26/2015    Status post bariatric surgery 1/11/2016    Type 2 diabetes mellitus, with long-term current use of insulin 5/8/2015    Urinary tract infection        Surgical History:   Jian Arrieta  has a past surgical history that includes  Angioplasty; Cyst Removal; perianal surgery; Colonoscopy (N/A, 10/6/2015); Knee arthroscopy; Gastrectomy; Coronary artery bypass graft (2017); Coronary angiography (Right, 3/20/2019); Coronary bypass graft angiography (3/20/2019); Insertion of dialysis catheter (N/A, 5/17/2019); Angiography (N/A, 6/28/2019); Esophagogastroduodenoscopy (N/A, 7/8/2019); Endoscopic ultrasound of upper gastrointestinal tract (N/A, 2/26/2020); Laparoscopic cholecystectomy (N/A, 5/4/2020); Liver biopsy (N/A, 5/4/2020); Esophagogastroduodenoscopy (N/A, 5/13/2021); Colonoscopy (N/A, 5/13/2021); Excision of hydrocele (Bilateral, 6/16/2021); Cystoscopy w/ ureteral stent placement (Right, 6/16/2021); Fluoroscopy (N/A, 6/16/2021); Ureteroscopy (Right, 6/30/2021); Laser lithotripsy (N/A, 6/30/2021); Pyeloscopy (Right, 6/30/2021); Cystoscopy (Right, 6/30/2021); and Replacement of stent (Right, 6/30/2021).    Medications:   Jian has a current medication list which includes the following prescription(s): aspirin, dexcom g6 sensor, dexcom g6 transmitter, bumetanide, humalog u-100 insulin, toujeo solostar u-300 insulin, lyumjev u-100 insulin, omnipod 5 g6 pods (gen 5), and pen needle, diabetic.    Allergies:   Review of patient's allergies indicates:  No Known Allergies     Imaging,       There is no evidence of a left lower extremity DVT.    The contralateral (right) common femoral vein is patent.    Surgery: Bypass in the L Leg   Radiation:  yes, in ear   Chemotherapy: none    Previous Lymphedema Treatment: yes   Prior Therapy: yes   Social History: lives with wife- beni    Abuse/Neglect: Pt shows no visible signs of abuse/neglect   Nutritional status: Pt reports no nutritional needs    Educational needs: Pt reports no educational needs    Spiritual/Cultural: Pt reports no spiritual/ cultural needs     Fall risk: none- he doesn't admit to falls   Occupation: Retired- fire department   Prior Level of Function: independent, has issues finding shoes    Current Level of Function: unsure   Gait: no AD   Transfers:Mod I    Bed Mobility: Mod I        Pts goals: get the swelling in his legs down     Objective     Pt arrives with tubigrip donned on LLE  Amount of Swelling/Location of Swelling: Significant swelling of BLEs, LLE> RLE  Skin Integrity: redness, no wounds, skin not hot   Palpation/Texture: pitting edema LLE   + B Stemmer Sign  - B Osmel's Sign  Circulation: intact      Posture: FHP      Strength: functional screen of AROM against gravity and sit to stand transfers       Sensation:  absent top of L foot     Girth Measurements (in centimeters)            CMS Impairment/Limitation/Restriction for FOTO  Survey  Limitation: NP     Therapist reviewed FOTO scores for Jian Arrieta on 7/31/2023.   FOTO documents entered into [employer] - see Media section.       TREATMENT     Total Treatment time separate from Evaluation: 30 minutes    Self Care/Home Management training for 30 minutes including:    Pt was educated in potential compression needs.  Demo of products including socks, garments, and Inelastic Velcro wraps.   Discussed cost/coverage and authorization per insurance with Durable Medical Equipment(DME) provider.  Compression require orders from referring provider and coverage or purchase of products from DME or self order.      Discussed wear schedule, don/doff, wash and management of products.  Size and compression class and AM/PM needs.    Issued edemawear size M     Informed insurance coverage of compression is per DME provider and typically Medicare and Medicare group plans may not cover cost beyond pair of standard sized knee high garments.   Commitment to attendance as well as commitment to securing compression needs is critical to edema management.      Home Exercises and Patient Education Provided  Education provided:   - lymph booklet   - Pt was educated in lymphedema etiology and management plans.  Pt was provided with written risk reductions and  precautions for managing lymphedema.      This patient is in agreement to participate in Lymphedema treatment.    Written Home Exercises Provided: yes.  Exercises were reviewed and Jian was able to demonstrate them prior to the end of the session.  Jian demonstrated good  understanding of the education provided.     See EMR under Patient Instructions for exercises provided 7/31/2023.    Assessment   Jian is a 67 y.o. male referred to outpatient Physical Therapy with a medical diagnosis of I89.0 (ICD-10-CM) - Lymphedema of both lower extremities    L03.119 (ICD-10-CM) - Cellulitis of lower extremity, unspecified laterality   . This patient presents s/p DM, CKD, HTN   resulting in: lymphedema of the BLEs, as well as difficulty finding shoes , compression needs, and placing the pt at higher risk of infection.     Pt presents with moderate swelling of BLEs with more swelling of the LLE. LLE has + stemmer sign and pitting edema. Pt has a hx of cellulitis with resuduial redness. Pt was educated on signs and symptoms of infection and to contact his care team if it arises. Pt's Leg is red but not hot to touch and only tender on the medial aspect of his L leg. Pt was issued edemawear size M for both legs with instructions to remove at night and if painful. Pt was educated on lymphedema causes and physiology, infection signs and symptoms, complete decongestive therapy and its components, and expectations for therapy. Pt was also educated on the need for some type of compression.       Pt prognosis is Fair.   Pt will benefit from skilled outpatient Physical Therapy to address the deficits stated above and in the chart below, provide pt/family education, and to maximize pt's level of independence.     Plan of care discussed with patient: Yes  Pt's spiritual, cultural and educational needs considered and patient is agreeable to the plan of care and goals as stated below:     Medical Necessity is demonstrated by the  following  History  Co-morbidities and personal factors that may impact the plan of care [] LOW: no personal factors / co-morbidities  [] MODERATE: 1-2 personal factors / co-morbidities  [x] HIGH: 3+ personal factors / co-morbidities    Moderate / High Support Documentation: Charcot foot, DM, CKD, HTN, BMI, skin CA, Age      Examination  Body Structures and Functions, activity limitations and participation restrictions that may impact the plan of care [] LOW: addressing 1-2 elements  [] MODERATE: 3+ elements  [x] HIGH: 4+ elements (please support below)    Moderate / High Support Documentation: skin integrity, skin color, edema, sensation, dressing      Clinical Presentation [] LOW: stable  [] MODERATE: Evolving  [x] HIGH: Unstable     Decision Making/ Complexity Score: high       Anticipated Barriers for therapy: none     The following goals were discussed with the patient and patient is in agreement with them as to be addressed in the treatment plan.     Short Term Goals: (6 weeks)  1. Patient will show decreased girth in B LE by up to 1 cm to allow for LE symmetry, shoe and clothing choice, and ability to apply needed compression.  (progressing, not met)   2. Patient will demonstrate 100% knowledge of lymphedema precautions and signs of infection to allow for reduced lymphedema risk, infection risk, and/or exacerbation of condition.  (progressing, not met)  3. Patient or caregiver will perform self-bandaging techniques and/or wearing of compression garments to allow for lymphatic drainage support, skin elasticity, and reduction in shape and size of limb. (progressing, not met)  4. Patient will perform self lymph drainage techniques to areas within reach to enhance lymphatic drainage and skin condition.  (progressing, not met)  5. Patient will tolerate daily activities with multilayered bandaging to allow for lymphatic and venous support.  (progressing, not met)    Long Term Goals: (12  weeks)  1. Patient will  show decreased girth in B LE by up to 2 cm  to allow for LE symmetry, shoe and clothing choice, and ability to apply needed compression daily.  (progressing, not met)  2. Patient will show reduction in density to mild or less with improved contour of limb to allow for cosmesis, LE symmetry, infection risk reduction, and clothing and compression choice.   (progressing, not met)  3. Patient to krystal/doff compression garment with daily compliance to assist in lymphedema management, skin elasticity, and tissue density.  (progressing, not met)  4. Pt to show improved postural awareness and alignment.  (progressing, not met)  5. Pt to be I and compliant with HEP to allow for increased function in affected limb.   (progressing, not met)  Plan   Plan of care Certification: 7/31/2023 to 9/10/2023.    Outpatient Physical Therapy 2 times weekly for 6 weeks to include the following interventions: Gait Training, Manual Therapy, Neuromuscular Re-ed, Patient Education, Self Care, Therapeutic Activities, and Therapeutic Exercise. Complete Decongestive Therapy- compression and home equipment needs to be addressed and assisted.    Pt may be seen by a PTA as part of the Rehab treatment team.  Plan of Care was discussed with Raeann Rodriguez PTA    Luisa Thompson, PT

## 2023-08-09 ENCOUNTER — PATIENT MESSAGE (OUTPATIENT)
Dept: PODIATRY | Facility: CLINIC | Age: 68
End: 2023-08-09

## 2023-08-09 ENCOUNTER — OFFICE VISIT (OUTPATIENT)
Dept: PODIATRY | Facility: CLINIC | Age: 68
End: 2023-08-09
Payer: MEDICARE

## 2023-08-09 ENCOUNTER — TELEPHONE (OUTPATIENT)
Dept: PODIATRY | Facility: CLINIC | Age: 68
End: 2023-08-09
Payer: MEDICARE

## 2023-08-09 VITALS
BODY MASS INDEX: 41.75 KG/M2 | SYSTOLIC BLOOD PRESSURE: 133 MMHG | HEART RATE: 76 BPM | DIASTOLIC BLOOD PRESSURE: 74 MMHG | HEIGHT: 67 IN

## 2023-08-09 DIAGNOSIS — E10.8 DIABETES MELLITUS TYPE 1, WITH COMPLICATION, ON LONG TERM INSULIN PUMP: ICD-10-CM

## 2023-08-09 DIAGNOSIS — E11.40 TYPE 2 DIABETES MELLITUS WITH DIABETIC NEUROPATHY, WITH LONG-TERM CURRENT USE OF INSULIN: Chronic | ICD-10-CM

## 2023-08-09 DIAGNOSIS — Z79.4 TYPE 2 DIABETES MELLITUS WITH DIABETIC NEUROPATHY, WITH LONG-TERM CURRENT USE OF INSULIN: Chronic | ICD-10-CM

## 2023-08-09 DIAGNOSIS — Z96.41 DIABETES MELLITUS TYPE 1, WITH COMPLICATION, ON LONG TERM INSULIN PUMP: ICD-10-CM

## 2023-08-09 DIAGNOSIS — I87.2 VENOUS INSUFFICIENCY OF BOTH LOWER EXTREMITIES: ICD-10-CM

## 2023-08-09 DIAGNOSIS — T14.8XXA BLISTER: Primary | ICD-10-CM

## 2023-08-09 PROCEDURE — 3066F PR DOCUMENTATION OF TREATMENT FOR NEPHROPATHY: ICD-10-PCS | Mod: CPTII,S$GLB,, | Performed by: STUDENT IN AN ORGANIZED HEALTH CARE EDUCATION/TRAINING PROGRAM

## 2023-08-09 PROCEDURE — 99214 PR OFFICE/OUTPT VISIT, EST, LEVL IV, 30-39 MIN: ICD-10-PCS | Mod: 25,S$GLB,, | Performed by: STUDENT IN AN ORGANIZED HEALTH CARE EDUCATION/TRAINING PROGRAM

## 2023-08-09 PROCEDURE — 11042 WOUND DEBRIDEMENT: ICD-10-PCS | Mod: 51,S$GLB,, | Performed by: STUDENT IN AN ORGANIZED HEALTH CARE EDUCATION/TRAINING PROGRAM

## 2023-08-09 PROCEDURE — 3078F PR MOST RECENT DIASTOLIC BLOOD PRESSURE < 80 MM HG: ICD-10-PCS | Mod: CPTII,S$GLB,, | Performed by: STUDENT IN AN ORGANIZED HEALTH CARE EDUCATION/TRAINING PROGRAM

## 2023-08-09 PROCEDURE — 99999 PR PBB SHADOW E&M-EST. PATIENT-LVL III: ICD-10-PCS | Mod: PBBFAC,,, | Performed by: STUDENT IN AN ORGANIZED HEALTH CARE EDUCATION/TRAINING PROGRAM

## 2023-08-09 PROCEDURE — 1125F AMNT PAIN NOTED PAIN PRSNT: CPT | Mod: CPTII,S$GLB,, | Performed by: STUDENT IN AN ORGANIZED HEALTH CARE EDUCATION/TRAINING PROGRAM

## 2023-08-09 PROCEDURE — 3075F PR MOST RECENT SYSTOLIC BLOOD PRESS GE 130-139MM HG: ICD-10-PCS | Mod: CPTII,S$GLB,, | Performed by: STUDENT IN AN ORGANIZED HEALTH CARE EDUCATION/TRAINING PROGRAM

## 2023-08-09 PROCEDURE — 99999 PR PBB SHADOW E&M-EST. PATIENT-LVL III: CPT | Mod: PBBFAC,,, | Performed by: STUDENT IN AN ORGANIZED HEALTH CARE EDUCATION/TRAINING PROGRAM

## 2023-08-09 PROCEDURE — 10140 I&D HMTMA SEROMA/FLUID COLLJ: CPT | Mod: S$GLB,,, | Performed by: STUDENT IN AN ORGANIZED HEALTH CARE EDUCATION/TRAINING PROGRAM

## 2023-08-09 PROCEDURE — 11042 DBRDMT SUBQ TIS 1ST 20SQCM/<: CPT | Mod: 51,S$GLB,, | Performed by: STUDENT IN AN ORGANIZED HEALTH CARE EDUCATION/TRAINING PROGRAM

## 2023-08-09 PROCEDURE — 3008F BODY MASS INDEX DOCD: CPT | Mod: CPTII,S$GLB,, | Performed by: STUDENT IN AN ORGANIZED HEALTH CARE EDUCATION/TRAINING PROGRAM

## 2023-08-09 PROCEDURE — 1159F MED LIST DOCD IN RCRD: CPT | Mod: CPTII,S$GLB,, | Performed by: STUDENT IN AN ORGANIZED HEALTH CARE EDUCATION/TRAINING PROGRAM

## 2023-08-09 PROCEDURE — 3062F PR POS MACROALBUMINURIA RESULT DOCUMENTED/REVIEW: ICD-10-PCS | Mod: CPTII,S$GLB,, | Performed by: STUDENT IN AN ORGANIZED HEALTH CARE EDUCATION/TRAINING PROGRAM

## 2023-08-09 PROCEDURE — 1159F PR MEDICATION LIST DOCUMENTED IN MEDICAL RECORD: ICD-10-PCS | Mod: CPTII,S$GLB,, | Performed by: STUDENT IN AN ORGANIZED HEALTH CARE EDUCATION/TRAINING PROGRAM

## 2023-08-09 PROCEDURE — 3062F POS MACROALBUMINURIA REV: CPT | Mod: CPTII,S$GLB,, | Performed by: STUDENT IN AN ORGANIZED HEALTH CARE EDUCATION/TRAINING PROGRAM

## 2023-08-09 PROCEDURE — 3078F DIAST BP <80 MM HG: CPT | Mod: CPTII,S$GLB,, | Performed by: STUDENT IN AN ORGANIZED HEALTH CARE EDUCATION/TRAINING PROGRAM

## 2023-08-09 PROCEDURE — 99214 OFFICE O/P EST MOD 30 MIN: CPT | Mod: 25,S$GLB,, | Performed by: STUDENT IN AN ORGANIZED HEALTH CARE EDUCATION/TRAINING PROGRAM

## 2023-08-09 PROCEDURE — 3008F PR BODY MASS INDEX (BMI) DOCUMENTED: ICD-10-PCS | Mod: CPTII,S$GLB,, | Performed by: STUDENT IN AN ORGANIZED HEALTH CARE EDUCATION/TRAINING PROGRAM

## 2023-08-09 PROCEDURE — 10140 PR DRAINAGE OF HEMATOMA/FLUID: ICD-10-PCS | Mod: S$GLB,,, | Performed by: STUDENT IN AN ORGANIZED HEALTH CARE EDUCATION/TRAINING PROGRAM

## 2023-08-09 PROCEDURE — 3075F SYST BP GE 130 - 139MM HG: CPT | Mod: CPTII,S$GLB,, | Performed by: STUDENT IN AN ORGANIZED HEALTH CARE EDUCATION/TRAINING PROGRAM

## 2023-08-09 PROCEDURE — 3051F HG A1C>EQUAL 7.0%<8.0%: CPT | Mod: CPTII,S$GLB,, | Performed by: STUDENT IN AN ORGANIZED HEALTH CARE EDUCATION/TRAINING PROGRAM

## 2023-08-09 PROCEDURE — 3066F NEPHROPATHY DOC TX: CPT | Mod: CPTII,S$GLB,, | Performed by: STUDENT IN AN ORGANIZED HEALTH CARE EDUCATION/TRAINING PROGRAM

## 2023-08-09 PROCEDURE — 3051F PR MOST RECENT HEMOGLOBIN A1C LEVEL 7.0 - < 8.0%: ICD-10-PCS | Mod: CPTII,S$GLB,, | Performed by: STUDENT IN AN ORGANIZED HEALTH CARE EDUCATION/TRAINING PROGRAM

## 2023-08-09 PROCEDURE — 1125F PR PAIN SEVERITY QUANTIFIED, PAIN PRESENT: ICD-10-PCS | Mod: CPTII,S$GLB,, | Performed by: STUDENT IN AN ORGANIZED HEALTH CARE EDUCATION/TRAINING PROGRAM

## 2023-08-09 NOTE — TELEPHONE ENCOUNTER
Dr. Felton, please advice. Patient was seen at the wound clinic. I know patient from [facility], please advice thank you

## 2023-08-09 NOTE — PROGRESS NOTES
Subjective:     Patient ID: Jian Arrieta is a 67 y.o. male.    Chief Complaint: Diabetes Mellitus, Foot Problem (Left ft., wet spot on the bottom of her ft., ), and Foot Pain (Left ft., )    Jian is a 67 y.o. male who presents to the clinic upon referral from Dr. Mcleod  for evaluation and treatment of diabetic feet. Jian has a past medical history of Alcohol abuse, Anasarca (1/28/2019), Anemia, Anticoagulant long-term use, Arthropathy associated with neurological disorder (9/2/2015), Atherosclerosis, Charcot foot due to diabetes mellitus, Chronic combined systolic and diastolic heart failure (01/29/2019), Chronic pancreatitis (1/28/2019), CKD (chronic kidney disease) stage 3, GFR 30-59 ml/min, CKD (chronic kidney disease) stage 4, GFR 15-29 ml/min, Colon polyps, Coronary artery disease due to calcified coronary lesion (05/08/2015), Diabetic polyneuropathy associated with type 2 diabetes mellitus (9/2/2015), Diverticulosis (1/28/2019), DM type 2 with diabetic peripheral neuropathy (2/4/2019), Encounter for blood transfusion, Essential hypertension (1/28/2019), Former smoker (8/26/2015), Healed ulcer of left foot on examination (6/20/2017), Hematuria, Hydrocele, Hyperphosphatemia, Hypoalbuminemia (2/4/2019), Hypocalcemia, Lymphedema of both lower extremities (1/29/2019), Mixed hyperlipidemia (5/8/2015), Morbid obesity with BMI of 50.0-59.9, adult (5/8/2015), Obstruction of right ureteropelvic junction (UPJ) due to stone (5/24/2021), Onychomycosis of multiple toenails with type 2 diabetes mellitus and peripheral neuropathy (6/20/2017), Perianal cyst, Proteinuria, Pseudocyst of pancreas (1/28/2019), Skin cancer, Sleep apnea (8/26/2015), Status post bariatric surgery (1/11/2016), Type 2 diabetes mellitus, with long-term current use of insulin (5/8/2015), and Urinary tract infection. Patient relates no major problem with feet. Only complaints today consist of blister to bottom of his left foot. Relates both of his dogs   over the past week and he has been on his feet a lot. No further pedal complaints. Has plan to travel to [facility] over weekend with his wife for their anniversary.    PCP: Leon Barajas, DO    Date Last Seen by PCP:     Current shoe gear: Casual shoes    Hemoglobin A1C   Date Value Ref Range Status   2023 7.4 (H) 4.0 - 5.6 % Final     Comment:     ADA Screening Guidelines:  5.7-6.4%  Consistent with prediabetes  >or=6.5%  Consistent with diabetes    High levels of fetal hemoglobin interfere with the HbA1C  assay. Heterozygous hemoglobin variants (HbS, HgC, etc)do  not significantly interfere with this assay.   However, presence of multiple variants may affect accuracy.     2023 8.1 (H) 4.0 - 5.6 % Final     Comment:     ADA Screening Guidelines:  5.7-6.4%  Consistent with prediabetes  >or=6.5%  Consistent with diabetes    High levels of fetal hemoglobin interfere with the HbA1C  assay. Heterozygous hemoglobin variants (HbS, HgC, etc)do  not significantly interfere with this assay.   However, presence of multiple variants may affect accuracy.     2023 8.9 (H) 4.0 - 5.6 % Final     Comment:     ADA Screening Guidelines:  5.7-6.4%  Consistent with prediabetes  >or=6.5%  Consistent with diabetes    High levels of fetal hemoglobin interfere with the HbA1C  assay. Heterozygous hemoglobin variants (HbS, HgC, etc)do  not significantly interfere with this assay.   However, presence of multiple variants may affect accuracy.           Review of Systems   Constitutional: Negative for chills, decreased appetite, diaphoresis and fever.   HENT:  Negative for congestion and hearing loss.    Cardiovascular:  Negative for chest pain, claudication and syncope.   Respiratory:  Negative for cough and shortness of breath.    Skin:  Positive for color change, dry skin, nail changes and poor wound healing. Negative for flushing, itching and rash.   Musculoskeletal:  Negative for back pain.   Gastrointestinal:   Negative for nausea and vomiting.   Neurological:  Positive for numbness and paresthesias. Negative for focal weakness and weakness.   Psychiatric/Behavioral:  Negative for altered mental status. The patient is not nervous/anxious.         Objective:     Physical Exam  Constitutional:       General: He is not in acute distress.     Appearance: Normal appearance. He is well-developed. He is not diaphoretic.   Cardiovascular:      Comments: Dorsalis pedis and posterior tibial pulses are within normal limits. Skin temperature is within normal limits. Toes are cool to touch and feet are warm proximally. Hair growth is diminished. Skin is mildly atrophic and with mild hyperpigmentation.lymphedema noted, bilaterally.   Musculoskeletal:         General: No tenderness.      Comments: Adequate joint range of motion without pain, limitation, nor crepitation to foot and ankle joints. Muscle strength is 5/5 in all groups bilaterally.       Feet:      Right foot:      Skin integrity: Dry skin present. No ulcer, blister, erythema or warmth.      Left foot:      Skin integrity: Dry skin present. No ulcer, blister, erythema or warmth.   Skin:     General: Skin is warm and dry.      Capillary Refill: Capillary refill takes less than 2 seconds.      Comments: Skin is warm and dry, no acute signs of infection noted bilaterally      Toenails are well trimmed and of normal morphology    Blister plantar left foot, upon debridement, with serous drainage and partial thickness ulcer, extending to dermal layer. No signs of infection noted. Post debridement measures 2.0x1.0x0.1cm    Otherwise, no open wounds, macerations or hyperkeratotic lesions, bilaterally            Neurological:      Mental Status: He is alert and oriented to person, place, and time.      Sensory: Sensory deficit present.      Motor: No abnormal muscle tone.      Comments: Light touch within normal limits. [place] 5.07 monofilamant testing is diminished.  Vibratory sensation is diminished bilaterally.   Psychiatric:         Mood and Affect: Mood normal.         Behavior: Behavior normal.         Thought Content: Thought content normal.           Assessment:      Encounter Diagnoses   Name Primary?    Blister Yes    Diabetes mellitus type 1, with complication, on long term insulin pump     Venous insufficiency of both lower extremities     Type 2 diabetes mellitus with diabetic neuropathy, with long-term current use of insulin      Plan:     Jian was seen today for diabetes mellitus, foot problem and foot pain.    Diagnoses and all orders for this visit:    Blister  -     Wound Debridement    Diabetes mellitus type 1, with complication, on long term insulin pump    Venous insufficiency of both lower extremities    Type 2 diabetes mellitus with diabetic neuropathy, with long-term current use of insulin      I counseled the patient on his conditions, their implications and medical management.  Blister deroofed and site debridement, no signs of infection  Left foot ulcer dressed wtih deepaa blue ready and football of marco foam, cast padding and light coban, secured with tubigrip and in darco shoe. He is to keep dressing clean, dry and intact until follow up visit.   Rest, elevate and avoid salt intake. Continue follow up with PCP for leg swelling. He has appointment at lymphedema clinic upcoming  Shoe inspection. Diabetic Foot Education. Patient reminded of the importance of good nutrition and blood sugar control to help prevent podiatric complications of diabetes. I discussed with the  patient  signs and symptoms of infection including redness, drainage, purulence, odor, pain, elevated BS, streaking, fever, chills, etc . Patient is to seek medical attention (ER or urgent care) if these symptoms occur    Return to clinic in 1 week, sooner PRN

## 2023-08-09 NOTE — TELEPHONE ENCOUNTER
----- Message from Сергей Jordan sent at 8/9/2023  9:02 AM CDT -----  Type:  Same Day Appointment Request    Caller is requesting a same day appointment.  Caller declined first available appointment listed below.    Name of Caller: pt  When is the first available appointment? Sep  Symptoms: white, painful spot on left foot  Best Call Back Number: [phone]  Additional Information:

## 2023-08-09 NOTE — PROCEDURES
Wound Debridement    Date/Time: 8/9/2023 1:30 PM    Performed by: Savanah Felton DPM  Authorized by: Savanah Felton DPM    Consent Done?:  Yes (Verbal)  Local anesthesia used?: No      Wound Details:    Location:  Left foot    Location:  Left 4th Metatarsal Head    Type of Debridement:  Excisional       Length (cm):  2       Area (sq cm):  4       Width (cm):  2       Percent Debrided (%):  100       Depth (cm):  0.1       Total Area Debrided (sq cm):  4    Depth of debridement:  Subcutaneous tissue    Tissue debrided:  Subcutaneous, Other, Dermis and Epidermis    Devitalized tissue debrided:  Biofilm and Callus    Instruments:  Blade  Patient tolerance:  Patient tolerated the procedure well with no immediate complications     No cultures were taken during this visit

## 2023-08-15 ENCOUNTER — OFFICE VISIT (OUTPATIENT)
Dept: PODIATRY | Facility: CLINIC | Age: 68
End: 2023-08-15
Payer: MEDICARE

## 2023-08-15 VITALS
HEART RATE: 75 BPM | HEIGHT: 67 IN | SYSTOLIC BLOOD PRESSURE: 145 MMHG | BODY MASS INDEX: 41.75 KG/M2 | DIASTOLIC BLOOD PRESSURE: 82 MMHG

## 2023-08-15 DIAGNOSIS — I83.009 VENOUS ULCER: Primary | ICD-10-CM

## 2023-08-15 DIAGNOSIS — L97.909 VENOUS ULCER: Primary | ICD-10-CM

## 2023-08-15 PROCEDURE — 3077F PR MOST RECENT SYSTOLIC BLOOD PRESSURE >= 140 MM HG: ICD-10-PCS | Mod: CPTII,S$GLB,, | Performed by: STUDENT IN AN ORGANIZED HEALTH CARE EDUCATION/TRAINING PROGRAM

## 2023-08-15 PROCEDURE — 3062F PR POS MACROALBUMINURIA RESULT DOCUMENTED/REVIEW: ICD-10-PCS | Mod: CPTII,S$GLB,, | Performed by: STUDENT IN AN ORGANIZED HEALTH CARE EDUCATION/TRAINING PROGRAM

## 2023-08-15 PROCEDURE — 99214 PR OFFICE/OUTPT VISIT, EST, LEVL IV, 30-39 MIN: ICD-10-PCS | Mod: 24,S$GLB,, | Performed by: STUDENT IN AN ORGANIZED HEALTH CARE EDUCATION/TRAINING PROGRAM

## 2023-08-15 PROCEDURE — 1159F MED LIST DOCD IN RCRD: CPT | Mod: CPTII,S$GLB,, | Performed by: STUDENT IN AN ORGANIZED HEALTH CARE EDUCATION/TRAINING PROGRAM

## 2023-08-15 PROCEDURE — 3051F PR MOST RECENT HEMOGLOBIN A1C LEVEL 7.0 - < 8.0%: ICD-10-PCS | Mod: CPTII,S$GLB,, | Performed by: STUDENT IN AN ORGANIZED HEALTH CARE EDUCATION/TRAINING PROGRAM

## 2023-08-15 PROCEDURE — 3008F PR BODY MASS INDEX (BMI) DOCUMENTED: ICD-10-PCS | Mod: CPTII,S$GLB,, | Performed by: STUDENT IN AN ORGANIZED HEALTH CARE EDUCATION/TRAINING PROGRAM

## 2023-08-15 PROCEDURE — 3077F SYST BP >= 140 MM HG: CPT | Mod: CPTII,S$GLB,, | Performed by: STUDENT IN AN ORGANIZED HEALTH CARE EDUCATION/TRAINING PROGRAM

## 2023-08-15 PROCEDURE — 1159F PR MEDICATION LIST DOCUMENTED IN MEDICAL RECORD: ICD-10-PCS | Mod: CPTII,S$GLB,, | Performed by: STUDENT IN AN ORGANIZED HEALTH CARE EDUCATION/TRAINING PROGRAM

## 2023-08-15 PROCEDURE — 99214 OFFICE O/P EST MOD 30 MIN: CPT | Mod: 24,S$GLB,, | Performed by: STUDENT IN AN ORGANIZED HEALTH CARE EDUCATION/TRAINING PROGRAM

## 2023-08-15 PROCEDURE — 3079F PR MOST RECENT DIASTOLIC BLOOD PRESSURE 80-89 MM HG: ICD-10-PCS | Mod: CPTII,S$GLB,, | Performed by: STUDENT IN AN ORGANIZED HEALTH CARE EDUCATION/TRAINING PROGRAM

## 2023-08-15 PROCEDURE — 3066F PR DOCUMENTATION OF TREATMENT FOR NEPHROPATHY: ICD-10-PCS | Mod: CPTII,S$GLB,, | Performed by: STUDENT IN AN ORGANIZED HEALTH CARE EDUCATION/TRAINING PROGRAM

## 2023-08-15 PROCEDURE — 1126F PR PAIN SEVERITY QUANTIFIED, NO PAIN PRESENT: ICD-10-PCS | Mod: CPTII,S$GLB,, | Performed by: STUDENT IN AN ORGANIZED HEALTH CARE EDUCATION/TRAINING PROGRAM

## 2023-08-15 PROCEDURE — 3066F NEPHROPATHY DOC TX: CPT | Mod: CPTII,S$GLB,, | Performed by: STUDENT IN AN ORGANIZED HEALTH CARE EDUCATION/TRAINING PROGRAM

## 2023-08-15 PROCEDURE — 3008F BODY MASS INDEX DOCD: CPT | Mod: CPTII,S$GLB,, | Performed by: STUDENT IN AN ORGANIZED HEALTH CARE EDUCATION/TRAINING PROGRAM

## 2023-08-15 PROCEDURE — 3062F POS MACROALBUMINURIA REV: CPT | Mod: CPTII,S$GLB,, | Performed by: STUDENT IN AN ORGANIZED HEALTH CARE EDUCATION/TRAINING PROGRAM

## 2023-08-15 PROCEDURE — 3051F HG A1C>EQUAL 7.0%<8.0%: CPT | Mod: CPTII,S$GLB,, | Performed by: STUDENT IN AN ORGANIZED HEALTH CARE EDUCATION/TRAINING PROGRAM

## 2023-08-15 PROCEDURE — 99999 PR PBB SHADOW E&M-EST. PATIENT-LVL III: ICD-10-PCS | Mod: PBBFAC,,, | Performed by: STUDENT IN AN ORGANIZED HEALTH CARE EDUCATION/TRAINING PROGRAM

## 2023-08-15 PROCEDURE — 1126F AMNT PAIN NOTED NONE PRSNT: CPT | Mod: CPTII,S$GLB,, | Performed by: STUDENT IN AN ORGANIZED HEALTH CARE EDUCATION/TRAINING PROGRAM

## 2023-08-15 PROCEDURE — 99999 PR PBB SHADOW E&M-EST. PATIENT-LVL III: CPT | Mod: PBBFAC,,, | Performed by: STUDENT IN AN ORGANIZED HEALTH CARE EDUCATION/TRAINING PROGRAM

## 2023-08-15 PROCEDURE — 3079F DIAST BP 80-89 MM HG: CPT | Mod: CPTII,S$GLB,, | Performed by: STUDENT IN AN ORGANIZED HEALTH CARE EDUCATION/TRAINING PROGRAM

## 2023-08-15 NOTE — PROGRESS NOTES
Subjective:     Patient ID: Jian Arrieta is a 67 y.o. male.    Chief Complaint: Wound Check    Jian is a 67 y.o. male who presents to the clinic upon referral from Dr. Monica vázquez. provider found  for evaluation and treatment of diabetic feet. Jian has a past medical history of Alcohol abuse, Anasarca (2019), Anemia, Anticoagulant long-term use, Arthropathy associated with neurological disorder (2015), Atherosclerosis, Charcot foot due to diabetes mellitus, Chronic combined systolic and diastolic heart failure (2019), Chronic pancreatitis (2019), CKD (chronic kidney disease) stage 3, GFR 30-59 ml/min, CKD (chronic kidney disease) stage 4, GFR 15-29 ml/min, Colon polyps, Coronary artery disease due to calcified coronary lesion (2015), Diabetic polyneuropathy associated with type 2 diabetes mellitus (2015), Diverticulosis (2019), DM type 2 with diabetic peripheral neuropathy (2019), Encounter for blood transfusion, Essential hypertension (2019), Former smoker (2015), Healed ulcer of left foot on examination (2017), Hematuria, Hydrocele, Hyperphosphatemia, Hypoalbuminemia (2019), Hypocalcemia, Lymphedema of both lower extremities (2019), Mixed hyperlipidemia (2015), Morbid obesity with BMI of 50.0-59.9, adult (2015), Obstruction of right ureteropelvic junction (UPJ) due to stone (2021), Onychomycosis of multiple toenails with type 2 diabetes mellitus and peripheral neuropathy (2017), Perianal cyst, Proteinuria, Pseudocyst of pancreas (2019), Skin cancer, Sleep apnea (2015), Status post bariatric surgery (2016), Type 2 diabetes mellitus, with long-term current use of insulin (2015), and Urinary tract infection. Patient relates no major problem with feet. Only complaints today consist of blister to bottom of his left foot. Relates both of his dogs  over the past week and he has been on his feet a lot. No further pedal  complaints. Has plan to travel to [place] over weekend with his wife for their anniversary.    8/15/23: Seen today, relates he did not go to the [facility], he decided to stay home and elevate his feet. Relates his left leg was itching badly in previous dressing and has new wound to left anterior leg where he scratched. No further pedal complaints. Denies signs of infection. Relates starts in lymphedema clinic in 2 weeks.     PCP: Leon Barajas,     Date Last Seen by PCP:     Current shoe gear: Casual shoes    Hemoglobin A1C   Date Value Ref Range Status   07/06/2023 7.4 (H) 4.0 - 5.6 % Final     Comment:     ADA Screening Guidelines:  5.7-6.4%  Consistent with prediabetes  >or=6.5%  Consistent with diabetes    High levels of fetal hemoglobin interfere with the HbA1C  assay. Heterozygous hemoglobin variants (HbS, HgC, etc)do  not significantly interfere with this assay.   However, presence of multiple variants may affect accuracy.     04/06/2023 8.1 (H) 4.0 - 5.6 % Final     Comment:     ADA Screening Guidelines:  5.7-6.4%  Consistent with prediabetes  >or=6.5%  Consistent with diabetes    High levels of fetal hemoglobin interfere with the HbA1C  assay. Heterozygous hemoglobin variants (HbS, HgC, etc)do  not significantly interfere with this assay.   However, presence of multiple variants may affect accuracy.     03/08/2023 8.9 (H) 4.0 - 5.6 % Final     Comment:     ADA Screening Guidelines:  5.7-6.4%  Consistent with prediabetes  >or=6.5%  Consistent with diabetes    High levels of fetal hemoglobin interfere with the HbA1C  assay. Heterozygous hemoglobin variants (HbS, HgC, etc)do  not significantly interfere with this assay.   However, presence of multiple variants may affect accuracy.           Review of Systems   Constitutional: Negative for chills, decreased appetite, diaphoresis and fever.   HENT:  Negative for congestion and hearing loss.    Cardiovascular:  Negative for chest pain, claudication and  syncope.   Respiratory:  Negative for cough and shortness of breath.    Skin:  Positive for color change, dry skin, nail changes and poor wound healing. Negative for flushing, itching and rash.   Musculoskeletal:  Negative for back pain.   Gastrointestinal:  Negative for nausea and vomiting.   Neurological:  Positive for numbness and paresthesias. Negative for focal weakness and weakness.   Psychiatric/Behavioral:  Negative for altered mental status. The patient is not nervous/anxious.         Objective:     Physical Exam  Constitutional:       General: He is not in acute distress.     Appearance: Normal appearance. He is well-developed. He is not diaphoretic.   Cardiovascular:      Comments: Dorsalis pedis and posterior tibial pulses are within normal limits. Skin temperature is within normal limits. Toes are cool to touch and feet are warm proximally. Hair growth is diminished. Skin is mildly atrophic and with mild hyperpigmentation.lymphedema noted, bilaterally.   Musculoskeletal:         General: No tenderness.      Comments: Adequate joint range of motion without pain, limitation, nor crepitation to foot and ankle joints. Muscle strength is 5/5 in all groups bilaterally.       Feet:      Right foot:      Skin integrity: Dry skin present. No ulcer, blister, erythema or warmth.      Left foot:      Skin integrity: Dry skin present. No ulcer, blister, erythema or warmth.   Skin:     General: Skin is warm and dry.      Capillary Refill: Capillary refill takes less than 2 seconds.      Comments: Skin is warm and dry, no acute signs of infection noted bilaterally      Toenails are well trimmed and of normal morphology    Blister plantar left foot is healed. Venous ulcer to left anterior leg, measures 0.3x0.5x0.1cm with granular base, mild serosanguinous drainage. No signs of infection noted.     Otherwise, no open wounds, macerations or hyperkeratotic lesions, bilaterally            Neurological:      Mental Status:  He is alert and oriented to person, place, and time.      Sensory: Sensory deficit present.      Motor: No abnormal muscle tone.      Comments: Light touch within normal limits. [place]-Aurelio 5.07 monofilamant testing is diminished. Vibratory sensation is diminished bilaterally.   Psychiatric:         Mood and Affect: Mood normal.         Behavior: Behavior normal.         Thought Content: Thought content normal.           Assessment:      Encounter Diagnosis   Name Primary?    Venous ulcer Yes     Plan:     Jian was seen today for wound check.    Diagnoses and all orders for this visit:    Venous ulcer      I counseled the patient on his conditions, their implications and medical management.  Blister/previous left foot ulcer is healed  Venous ulcer dressed wtih hydrafera blue ready, mepilex border and and secured with tubigrip, He may change every 3-5 days at home.   Rest, elevate and avoid salt intake. Continue follow up with PCP for leg swelling. He has appointment at lymphedema clinic upcoming  Shoe inspection. Diabetic Foot Education. Patient reminded of the importance of good nutrition and blood sugar control to help prevent podiatric complications of diabetes. I discussed with the  patient  signs and symptoms of infection including redness, drainage, purulence, odor, pain, elevated BS, streaking, fever, chills, etc . Patient is to seek medical attention (ER or urgent care) if these symptoms occur    Return to clinic in 2-4 weeks, sooner PRN

## 2023-08-30 ENCOUNTER — CLINICAL SUPPORT (OUTPATIENT)
Dept: REHABILITATION | Facility: HOSPITAL | Age: 68
End: 2023-08-30
Payer: MEDICARE

## 2023-08-30 DIAGNOSIS — Z79.4 TYPE 2 DIABETES MELLITUS WITH STAGE 3 CHRONIC KIDNEY DISEASE, WITH LONG-TERM CURRENT USE OF INSULIN, UNSPECIFIED WHETHER STAGE 3A OR 3B CKD: ICD-10-CM

## 2023-08-30 DIAGNOSIS — N18.30 TYPE 2 DIABETES MELLITUS WITH STAGE 3 CHRONIC KIDNEY DISEASE, WITH LONG-TERM CURRENT USE OF INSULIN, UNSPECIFIED WHETHER STAGE 3A OR 3B CKD: ICD-10-CM

## 2023-08-30 DIAGNOSIS — I87.2 VENOUS INSUFFICIENCY OF BOTH LOWER EXTREMITIES: Primary | ICD-10-CM

## 2023-08-30 DIAGNOSIS — E11.22 TYPE 2 DIABETES MELLITUS WITH STAGE 3 CHRONIC KIDNEY DISEASE, WITH LONG-TERM CURRENT USE OF INSULIN, UNSPECIFIED WHETHER STAGE 3A OR 3B CKD: ICD-10-CM

## 2023-08-30 PROCEDURE — 29581 APPL MULTLAYER CMPRN SYS LEG: CPT

## 2023-08-30 PROCEDURE — 97140 MANUAL THERAPY 1/> REGIONS: CPT

## 2023-08-30 NOTE — PROGRESS NOTES
Physical Therapy Daily Treatment Note     Name: Jian JASMINE [facility] Number: [id number]    Therapy Diagnosis:   Encounter Diagnoses   Name Primary?    Venous insufficiency of both lower extremities Yes    Type 2 diabetes mellitus with stage 3 chronic kidney disease, with long-term current use of insulin, unspecified whether stage 3a or 3b CKD      Physician: Savanah Felton DPM    Visit Date: 8/30/2023    Physician: Savanah Felton DPM     Physician Orders: PT Eval and Treat - lymphedema  Medical Diagnosis from Referral: I89.0 (ICD-10-CM) - Lymphedema of both lower extremities    L03.119 (ICD-10-CM) - Cellulitis of lower extremity, unspecified laterality     Evaluation Date: 7/31/2023  Authorization Period Expiration: 12/31/2023  Plan of Care Expiration: 9/10/2023  Visit # / Visits authorized: 1/ 30     Time In: 9:00am  Time Out: 10:00am  Total Billable Time: 60 minutes     Precautions: Standard and Diabetes    Subjective     Pt reports: He is doing ok  He was compliant with home exercise program.  Response to previous treatment: fine, no issues   Functional change: wearing temporary compression     Pain: 0/10  Location: bilateral lower legs     Objective       Treatment:   Jian received the following manual therapy techniques:- Manual Lymphatic Drainage were applied to the: LLE for 60 minutes, including: MLD and short stretch compression bandaging       MANUAL LYMPHATIC DRAINAGE (MLD):    While supine with LEs elevated stimulation at terminus, along GI region, B inguinal regions, drainage of entire L LE betina lower leg, ankle, and foot with return proximally,  Use of Aquaphor due to dryness.   Educated in self massage to abdominal areas, B inguinal areas, thigh, and remaining LE within reach.        MULTILAYERED BANDAGING:  issued supplies and bandaged L LE with cotton stockinette, 2 komprex wedges post malleoli, 2 cellona rolls ankle to knee, 1-8cm and 2- 10cm Rosidal K rolls foot to knee, to leave intact  12-24 hrs as tolerated, discontinue with any problems, return rolled bandages next session. Wash and wear schedules confirmed.     Home Exercises Provided and Patient Education Provided     Education provided:    Remove if painful   PATIENT/FAMILY Education: bandaging wear schedule,  HEP,  Beginning of self massage,  Self or assisted bandaging, compression options, and Risk reduction    Written Home Exercises Provided: Patient instructed to cont prior HEP.  Exercises were reviewed and Jian was able to demonstrate them prior to the end of the session.  Jian demonstrated good  understanding of the education provided.       Assessment     Pt tolerated treatment well with no reports of pain     Pt presents with moderate edema secondary to hx of cellulitis      Pt's LLE was massaged to drain excess blood/ fluid. Pt was given info on steps and purpose of MLD.     Pt's LLE was bandaged using short stretch compression bandaging to apply graduated compression and aid in edema reduction. Pt was instructed to wear 12-24 hours and remove if painful. Pt was instructed on care of bandages and asked to bring bandages with them for next visit       Pt is complaint with use of compression in the form of edemawear size M     Pt would benefit from therapy and management of other health concerns to reduce edema, aid in finding compression and preparing for home management       Jian Is progressing well towards his goals.   Pt prognosis is Good.     Pt will continue to benefit from skilled outpatient physical therapy to address the deficits listed in the problem list box on initial evaluation, provide pt/family education and to maximize pt's level of independence in the home and community environment.     Pt's spiritual, cultural and educational needs considered and pt agreeable to plan of care and goals.     Anticipated barriers to physical therapy: skin integrity- will check regularly for skin integrity     Goals:     Short Term  Goals: (6 weeks)  1. Patient will show decreased girth in B LE by up to 1 cm to allow for LE symmetry, shoe and clothing choice, and ability to apply needed compression.  (progressing, not met)   2. Patient will demonstrate 100% knowledge of lymphedema precautions and signs of infection to allow for reduced lymphedema risk, infection risk, and/or exacerbation of condition.  (progressing, not met)  3. Patient or caregiver will perform self-bandaging techniques and/or wearing of compression garments to allow for lymphatic drainage support, skin elasticity, and reduction in shape and size of limb. (progressing, not met)  4. Patient will perform self lymph drainage techniques to areas within reach to enhance lymphatic drainage and skin condition.  (progressing, not met)  5. Patient will tolerate daily activities with multilayered bandaging to allow for lymphatic and venous support.  (progressing, not met)     Long Term Goals: (12  weeks)  1. Patient will show decreased girth in B LE by up to 2 cm  to allow for LE symmetry, shoe and clothing choice, and ability to apply needed compression daily.  (progressing, not met)  2. Patient will show reduction in density to mild or less with improved contour of limb to allow for cosmesis, LE symmetry, infection risk reduction, and clothing and compression choice.   (progressing, not met)  3. Patient to krystal/doff compression garment with daily compliance to assist in lymphedema management, skin elasticity, and tissue density.  (progressing, not met)  4. Pt to show improved postural awareness and alignment.  (progressing, not met)  5. Pt to be I and compliant with HEP to allow for increased function in affected limb.   (progressing, not met)    Plan   Continue PT  2x   weekly for Complete Decongestive Therapy:  Manual lymphatic drainage, Multilayered short stretch bandaging, Pneumatic compression, Therapeutic exercises, Patient education as deemed necessary to achieve stated  goals.      Luisa Thompson, PT

## 2023-09-01 ENCOUNTER — CLINICAL SUPPORT (OUTPATIENT)
Dept: REHABILITATION | Facility: HOSPITAL | Age: 68
End: 2023-09-01
Payer: MEDICARE

## 2023-09-01 DIAGNOSIS — I87.2 VENOUS INSUFFICIENCY OF BOTH LOWER EXTREMITIES: Primary | ICD-10-CM

## 2023-09-01 DIAGNOSIS — Z79.4 TYPE 2 DIABETES MELLITUS WITH STAGE 3 CHRONIC KIDNEY DISEASE, WITH LONG-TERM CURRENT USE OF INSULIN, UNSPECIFIED WHETHER STAGE 3A OR 3B CKD: ICD-10-CM

## 2023-09-01 DIAGNOSIS — E11.22 TYPE 2 DIABETES MELLITUS WITH STAGE 3 CHRONIC KIDNEY DISEASE, WITH LONG-TERM CURRENT USE OF INSULIN, UNSPECIFIED WHETHER STAGE 3A OR 3B CKD: ICD-10-CM

## 2023-09-01 DIAGNOSIS — N18.30 TYPE 2 DIABETES MELLITUS WITH STAGE 3 CHRONIC KIDNEY DISEASE, WITH LONG-TERM CURRENT USE OF INSULIN, UNSPECIFIED WHETHER STAGE 3A OR 3B CKD: ICD-10-CM

## 2023-09-01 PROCEDURE — 97535 SELF CARE MNGMENT TRAINING: CPT | Mod: CQ

## 2023-09-01 PROCEDURE — 97140 MANUAL THERAPY 1/> REGIONS: CPT | Mod: CQ

## 2023-09-01 NOTE — PROGRESS NOTES
Physical Therapy Daily Treatment Note     Name: Jian JASMINE [facility] Number: [id number]    Therapy Diagnosis:   Encounter Diagnoses   Name Primary?    Venous insufficiency of both lower extremities Yes    Type 2 diabetes mellitus with stage 3 chronic kidney disease, with long-term current use of insulin, unspecified whether stage 3a or 3b CKD        Physician: Savanah Felton DPM    Visit Date: 9/1/2023    Physician: Savanah Felton DPM     Physician Orders: PT Eval and Treat - lymphedema  Medical Diagnosis from Referral: I89.0 (ICD-10-CM) - Lymphedema of both lower extremities    L03.119 (ICD-10-CM) - Cellulitis of lower extremity, unspecified laterality     Evaluation Date: 7/31/2023  Authorization Period Expiration: 12/31/2023  Plan of Care Expiration: 9/10/2023  Visit # / Visits authorized: 2/ 30     Time In: 9:00am  Time Out: 10:00am  Total Billable Time: 60 minutes     Precautions: Standard and Diabetes    Subjective     Pt reports: he is doing well with no pain. He removed the bandages last night and noticed improvements after removing.   He was compliant with home exercise program.  Response to previous treatment: notes good response to bandaging.   Functional change: wearing temporary compression     Pain: 0/10  Location: bilateral lower legs     Objective     Pt presents , no compression donned.     Treatment:   Jian received the following manual therapy techniques:- Manual Lymphatic Drainage were applied to the: LLE for 50 minutes, including: MLD and short stretch compression bandaging       MANUAL LYMPHATIC DRAINAGE (MLD):    While supine with LEs elevated stimulation at terminus, along GI region, B inguinal regions, drainage of entire L LE betina lower leg, ankle, and foot with return proximally,  Use of Aquaphor due to dryness.   Educated in self massage to abdominal areas, B inguinal areas, thigh, and remaining LE within reach.    MULTILAYERED BANDAGING:  issued supplies and bandaged L LE with  cotton stockinette, 2 komprex wedges post malleoli, 2 cellona rolls ankle to knee, 1-8cm and 2- 10cm Rosidal K rolls foot to knee, to leave intact 12-24 hrs as tolerated, discontinue with any problems, return rolled bandages next session. Wash and wear schedules confirmed.   Edema wear size medium provided and donned to RLE with education on donning/doffing , proper fit and wear schedule .   Second segment cut and provided to apply to LLE after removing bandages .     Home Exercises Provided and Patient Education Provided   Self care/home management for 10' including :   - Importance of daily use of compression - pt admits difficulty reaching feet and will need assistance   - Demo of inelastic velcro wraps - discussed possible 100$ per leg if no insurance coverage   - Reviewed donning/doffing of velcro wraps for pt to consider     Education provided:    Remove if painful   PATIENT/FAMILY Education: bandaging wear schedule,  HEP,  Beginning of self massage,  Self or assisted bandaging, compression options, and Risk reduction    Written Home Exercises Provided: Patient instructed to cont prior HEP.  Exercises were reviewed and Jian was able to demonstrate them prior to the end of the session.  Jian demonstrated good  understanding of the education provided.       Assessment     Pt responded well to initial trial on bandaging. Continuing to focus on LLE with plans to progress to BLEs at next session. Discussed importance of use of compression and education on compression products including inelastic velcro wraps as pt admits difficulty reaching feet .     Pt presents with moderate edema secondary to hx of cellulitis  Pt would benefit from therapy and management of other health concerns to reduce edema, aid in finding compression and preparing for home management       Jian Is progressing well towards his goals.   Pt prognosis is Good.     Pt will continue to benefit from skilled outpatient physical therapy to address  the deficits listed in the problem list box on initial evaluation, provide pt/family education and to maximize pt's level of independence in the home and community environment.   Pt's spiritual, cultural and educational needs considered and pt agreeable to plan of care and goals.     Anticipated barriers to physical therapy: skin integrity- will check regularly for skin integrity     Goals:     Short Term Goals: (6 weeks)  1. Patient will show decreased girth in B LE by up to 1 cm to allow for LE symmetry, shoe and clothing choice, and ability to apply needed compression.  (progressing, not met)   2. Patient will demonstrate 100% knowledge of lymphedema precautions and signs of infection to allow for reduced lymphedema risk, infection risk, and/or exacerbation of condition.  (progressing, not met)  3. Patient or caregiver will perform self-bandaging techniques and/or wearing of compression garments to allow for lymphatic drainage support, skin elasticity, and reduction in shape and size of limb. (progressing, not met)  4. Patient will perform self lymph drainage techniques to areas within reach to enhance lymphatic drainage and skin condition.  (progressing, not met)  5. Patient will tolerate daily activities with multilayered bandaging to allow for lymphatic and venous support.  (progressing, not met)     Long Term Goals: (12  weeks)  1. Patient will show decreased girth in B LE by up to 2 cm  to allow for LE symmetry, shoe and clothing choice, and ability to apply needed compression daily.  (progressing, not met)  2. Patient will show reduction in density to mild or less with improved contour of limb to allow for cosmesis, LE symmetry, infection risk reduction, and clothing and compression choice.   (progressing, not met)  3. Patient to krystal/doff compression garment with daily compliance to assist in lymphedema management, skin elasticity, and tissue density.  (progressing, not met)  4. Pt to show improved  postural awareness and alignment.  (progressing, not met)  5. Pt to be I and compliant with HEP to allow for increased function in affected limb.   (progressing, not met)    Plan   Continue PT  2x   weekly for Complete Decongestive Therapy:  Manual lymphatic drainage, Multilayered short stretch bandaging, Pneumatic compression, Therapeutic exercises, Patient education as deemed necessary to achieve stated goals.      Raeann Rodriguez, PTA

## 2023-09-05 NOTE — PROGRESS NOTES
Physical Therapy Daily Treatment Note     Name: Jian JASMINE [facility] Number: [id number]    Therapy Diagnosis:   Encounter Diagnoses   Name Primary?    Venous insufficiency of both lower extremities Yes    Type 2 diabetes mellitus with stage 3 chronic kidney disease, with long-term current use of insulin, unspecified whether stage 3a or 3b CKD        Physician: Savanah Felton DPM    Visit Date: 9/6/2023    Physician: Savanah Felton DPM     Physician Orders: PT Eval and Treat - lymphedema  Medical Diagnosis from Referral: I89.0 (ICD-10-CM) - Lymphedema of both lower extremities    L03.119 (ICD-10-CM) - Cellulitis of lower extremity, unspecified laterality     Evaluation Date: 7/31/2023  Authorization Period Expiration: 12/31/2023  Plan of Care Expiration: 9/10/2023  Visit # / Visits authorized: 3/ 30     Time In: 9:00am  Time Out: 10:00am  Total Billable Time: 60 minutes     Precautions: Standard and Diabetes    Subjective     Pt reports: doing well today , went to [place] over the weekend. Did well with the bandages and made sure to re-roll them for therapy today .   He was compliant with home exercise program.  Response to previous treatment: notes good response to bandaging.   Functional change: wearing temporary compression     Pain: 0/10  Location: bilateral lower legs     Objective     Pt presents, no compression donned.     Treatment:   Jian received the following manual therapy techniques:- Manual Lymphatic Drainage were applied to the: LLE and RLE for 50 minutes, including: MLD and short stretch compression bandaging       MANUAL LYMPHATIC DRAINAGE (MLD):    While supine with LEs elevated stimulation at terminus, along GI region, B inguinal regions, drainage of entire R LE betina lower leg, ankle, and foot with return proximally,  Use of Aquaphor due to dryness.   Educated in self massage to abdominal areas, B inguinal areas, thigh, and remaining LE within reach.    MULTILAYERED BANDAGING:  issued  supplies and bandaged L LE and RLE with cotton stockinette, 2 komprex wedges post malleoli, 2 cellona rolls ankle to knee, 1-8cm and 2- 10cm Rosidal K rolls foot to knee, to leave intact 12-24 hrs as tolerated, discontinue with any problems, return rolled bandages next session. Wash and wear schedules confirmed.   Edema wear size medium -  education on donning/doffing , proper fit and wear schedule . Can apply after removing bandages and wear daily    Home Exercises Provided and Patient Education Provided   Self care/home management for 10' including :   - Importance of daily use of compression - pt admits difficulty reaching feet and will need assistance   - Demo of inelastic velcro wraps - discussed possible 100$ per leg if no insurance coverage   - Reviewed donning/doffing of velcro wraps for pt to consider     Education provided:    Remove if painful   PATIENT/FAMILY Education: bandaging wear schedule,  HEP,  Beginning of self massage,  Self or assisted bandaging, compression options, and Risk reduction    Written Home Exercises Provided: Patient instructed to cont prior HEP.  Exercises were reviewed and Jian was able to demonstrate them prior to the end of the session.  Jian demonstrated good  understanding of the education provided.     Assessment     Pt is progressing well and shows improvements in swelling and skin integrity. Progressed with bandaging to BLEs today which pt tolerated well. Discussed needs for daily compression and may need to consider velcro wraps due to shape/size of legs and difficulty donning/doffing traditional compression garments .     Pt presents with moderate edema secondary to hx of cellulitis  Pt would benefit from therapy and management of other health concerns to reduce edema, aid in finding compression and preparing for home management     Jian Is progressing well towards his goals.   Pt prognosis is Good.     Pt will continue to benefit from skilled outpatient physical therapy  to address the deficits listed in the problem list box on initial evaluation, provide pt/family education and to maximize pt's level of independence in the home and community environment.   Pt's spiritual, cultural and educational needs considered and pt agreeable to plan of care and goals.     Anticipated barriers to physical therapy: skin integrity- will check regularly for skin integrity     Goals:     Short Term Goals: (6 weeks)  1. Patient will show decreased girth in B LE by up to 1 cm to allow for LE symmetry, shoe and clothing choice, and ability to apply needed compression.  (progressing, not met)   2. Patient will demonstrate 100% knowledge of lymphedema precautions and signs of infection to allow for reduced lymphedema risk, infection risk, and/or exacerbation of condition.  (progressing, not met)  3. Patient or caregiver will perform self-bandaging techniques and/or wearing of compression garments to allow for lymphatic drainage support, skin elasticity, and reduction in shape and size of limb. (progressing, not met)  4. Patient will perform self lymph drainage techniques to areas within reach to enhance lymphatic drainage and skin condition.  (progressing, not met)  5. Patient will tolerate daily activities with multilayered bandaging to allow for lymphatic and venous support.  (progressing, not met)     Long Term Goals: (12  weeks)  1. Patient will show decreased girth in B LE by up to 2 cm  to allow for LE symmetry, shoe and clothing choice, and ability to apply needed compression daily.  (progressing, not met)  2. Patient will show reduction in density to mild or less with improved contour of limb to allow for cosmesis, LE symmetry, infection risk reduction, and clothing and compression choice.   (progressing, not met)  3. Patient to krystal/doff compression garment with daily compliance to assist in lymphedema management, skin elasticity, and tissue density.  (progressing, not met)  4. Pt to show  improved postural awareness and alignment.  (progressing, not met)  5. Pt to be I and compliant with HEP to allow for increased function in affected limb.   (progressing, not met)    Plan   Continue PT  2x   weekly for Complete Decongestive Therapy:  Manual lymphatic drainage, Multilayered short stretch bandaging, Pneumatic compression, Therapeutic exercises, Patient education as deemed necessary to achieve stated goals.      Raeann Rodriguez, PTA

## 2023-09-06 ENCOUNTER — CLINICAL SUPPORT (OUTPATIENT)
Dept: REHABILITATION | Facility: HOSPITAL | Age: 68
End: 2023-09-06
Payer: MEDICARE

## 2023-09-06 DIAGNOSIS — I87.2 VENOUS INSUFFICIENCY OF BOTH LOWER EXTREMITIES: Primary | ICD-10-CM

## 2023-09-06 DIAGNOSIS — Z79.4 TYPE 2 DIABETES MELLITUS WITH STAGE 3 CHRONIC KIDNEY DISEASE, WITH LONG-TERM CURRENT USE OF INSULIN, UNSPECIFIED WHETHER STAGE 3A OR 3B CKD: ICD-10-CM

## 2023-09-06 DIAGNOSIS — E11.22 TYPE 2 DIABETES MELLITUS WITH STAGE 3 CHRONIC KIDNEY DISEASE, WITH LONG-TERM CURRENT USE OF INSULIN, UNSPECIFIED WHETHER STAGE 3A OR 3B CKD: ICD-10-CM

## 2023-09-06 DIAGNOSIS — N18.30 TYPE 2 DIABETES MELLITUS WITH STAGE 3 CHRONIC KIDNEY DISEASE, WITH LONG-TERM CURRENT USE OF INSULIN, UNSPECIFIED WHETHER STAGE 3A OR 3B CKD: ICD-10-CM

## 2023-09-06 PROCEDURE — 97140 MANUAL THERAPY 1/> REGIONS: CPT | Mod: CQ

## 2023-09-06 PROCEDURE — 97535 SELF CARE MNGMENT TRAINING: CPT | Mod: CQ

## 2023-09-07 NOTE — PROGRESS NOTES
Physical Therapy Daily Treatment Note     Name: Jian JASMINE [facility] Number: [id number]    Therapy Diagnosis:   Encounter Diagnoses   Name Primary?    Venous insufficiency of both lower extremities Yes    Type 2 diabetes mellitus with stage 3 chronic kidney disease, with long-term current use of insulin, unspecified whether stage 3a or 3b CKD        Physician: Savanah Felton DPM    Visit Date: 9/8/2023    Physician: Savanah Felton DPM     Physician Orders: PT Eval and Treat - lymphedema  Medical Diagnosis from Referral: I89.0 (ICD-10-CM) - Lymphedema of both lower extremities    L03.119 (ICD-10-CM) - Cellulitis of lower extremity, unspecified laterality     Evaluation Date: 7/31/2023  Authorization Period Expiration: 12/31/2023  Plan of Care Expiration: 9/10/2023  Visit # / Visits authorized: 4/ 30     Time In: 9:00am  Time Out: 10:00am  Total Billable Time: 60 minutes     Precautions: Standard and Diabetes    Subjective     Pt reports: he feels the bandages could have maybe been a little tighter. He removed them this morning and bathed at 5am before therapy .   He was compliant with home exercise program.  Response to previous treatment: notes good response to bandaging.   Functional change: wearing temporary compression     Pain: 0/10  Location: bilateral lower legs     Objective     Pt presents, tubigrip donned to BLEs .     Treatment:   Jian received the following manual therapy techniques:- Manual Lymphatic Drainage were applied to the: LLE and RLE for 25 minutes, including: MLD and short stretch compression bandaging       MANUAL LYMPHATIC DRAINAGE (MLD):    While supine with LEs elevated stimulation at terminus, along GI region, B inguinal regions, drainage of entire R LE betina lower leg, ankle, and foot with return proximally,  Use of Aquaphor due to dryness.   Educated in self massage to abdominal areas, B inguinal areas, thigh, and remaining LE within reach.    MULTILAYERED BANDAGING:  issued  supplies and bandaged L LE and RLE with cotton stockinette, 2 komprex wedges post malleoli, 2 cellona rolls ankle to knee, 1-8cm and 2- 10cm Rosidal K rolls foot to knee, to leave intact 12-24 hrs as tolerated, discontinue with any problems, return rolled bandages next session. Wash and wear schedules confirmed.   Edema wear size medium -  education on donning/doffing , proper fit and wear schedule . Can apply after removing bandages and wear daily    Home Exercises Provided and Patient Education Provided   Self care/home management for 35' including :     - Importance of daily use of compression - pt admits difficulty reaching feet and will need assistance   - Demo of inelastic velcro wraps - discussed possible 100$ per leg if no insurance coverage   - Reviewed donning/doffing of velcro wraps   - Assisted with measuring R and LLE today for pt to self purchase   - Measurements and info for self purchase printed and provided   - Pt sized for sigvaris compreflex complete , size XL regular length for R and LLE    Education provided:    Remove if painful   PATIENT/FAMILY Education: bandaging wear schedule,  HEP,  Beginning of self massage,  Self or assisted bandaging, compression options, and Risk reduction    Written Home Exercises Provided: Patient instructed to cont prior HEP.  Exercises were reviewed and Jian was able to demonstrate them prior to the end of the session.  Jian demonstrated good  understanding of the education provided.     Assessment     Pt is responding well and shows improvements in swelling and skin integrity. Assisted with compression choice and measurements today for self purchase of inelastic velcro compression wraps . Therapy feels velcro wraps may be more beneficial as pt has difficulty reaching lower legs and feet.     Pt presents with moderate edema secondary to hx of cellulitis  Pt would benefit from therapy and management of other health concerns to reduce edema, aid in finding  compression and preparing for home management     Jian Is progressing well towards his goals.   Pt prognosis is Good.     Pt will continue to benefit from skilled outpatient physical therapy to address the deficits listed in the problem list box on initial evaluation, provide pt/family education and to maximize pt's level of independence in the home and community environment.   Pt's spiritual, cultural and educational needs considered and pt agreeable to plan of care and goals.     Anticipated barriers to physical therapy: skin integrity- will check regularly for skin integrity     Goals:     Short Term Goals: (6 weeks)  1. Patient will show decreased girth in B LE by up to 1 cm to allow for LE symmetry, shoe and clothing choice, and ability to apply needed compression.  (progressing, not met)   2. Patient will demonstrate 100% knowledge of lymphedema precautions and signs of infection to allow for reduced lymphedema risk, infection risk, and/or exacerbation of condition.  (progressing, not met)  3. Patient or caregiver will perform self-bandaging techniques and/or wearing of compression garments to allow for lymphatic drainage support, skin elasticity, and reduction in shape and size of limb. (progressing, not met)  4. Patient will perform self lymph drainage techniques to areas within reach to enhance lymphatic drainage and skin condition.  (progressing, not met)  5. Patient will tolerate daily activities with multilayered bandaging to allow for lymphatic and venous support.  (progressing, not met)     Long Term Goals: (12  weeks)  1. Patient will show decreased girth in B LE by up to 2 cm  to allow for LE symmetry, shoe and clothing choice, and ability to apply needed compression daily.  (progressing, not met)  2. Patient will show reduction in density to mild or less with improved contour of limb to allow for cosmesis, LE symmetry, infection risk reduction, and clothing and compression choice.   (progressing,  not met)  3. Patient to krystal/doff compression garment with daily compliance to assist in lymphedema management, skin elasticity, and tissue density.  (progressing, not met)  4. Pt to show improved postural awareness and alignment.  (progressing, not met)  5. Pt to be I and compliant with HEP to allow for increased function in affected limb.   (progressing, not met)    Plan   Continue PT  2x   weekly for Complete Decongestive Therapy:  Manual lymphatic drainage, Multilayered short stretch bandaging, Pneumatic compression, Therapeutic exercises, Patient education as deemed necessary to achieve stated goals.      Raeann Rodriguez, PTA

## 2023-09-08 ENCOUNTER — CLINICAL SUPPORT (OUTPATIENT)
Dept: REHABILITATION | Facility: HOSPITAL | Age: 68
End: 2023-09-08
Payer: MEDICARE

## 2023-09-08 DIAGNOSIS — N18.30 TYPE 2 DIABETES MELLITUS WITH STAGE 3 CHRONIC KIDNEY DISEASE, WITH LONG-TERM CURRENT USE OF INSULIN, UNSPECIFIED WHETHER STAGE 3A OR 3B CKD: ICD-10-CM

## 2023-09-08 DIAGNOSIS — I87.2 VENOUS INSUFFICIENCY OF BOTH LOWER EXTREMITIES: Primary | ICD-10-CM

## 2023-09-08 DIAGNOSIS — Z79.4 TYPE 2 DIABETES MELLITUS WITH STAGE 3 CHRONIC KIDNEY DISEASE, WITH LONG-TERM CURRENT USE OF INSULIN, UNSPECIFIED WHETHER STAGE 3A OR 3B CKD: ICD-10-CM

## 2023-09-08 DIAGNOSIS — E11.22 TYPE 2 DIABETES MELLITUS WITH STAGE 3 CHRONIC KIDNEY DISEASE, WITH LONG-TERM CURRENT USE OF INSULIN, UNSPECIFIED WHETHER STAGE 3A OR 3B CKD: ICD-10-CM

## 2023-09-08 PROCEDURE — 97140 MANUAL THERAPY 1/> REGIONS: CPT | Mod: CQ

## 2023-09-08 PROCEDURE — 97535 SELF CARE MNGMENT TRAINING: CPT | Mod: CQ

## 2023-09-12 ENCOUNTER — OFFICE VISIT (OUTPATIENT)
Dept: PODIATRY | Facility: CLINIC | Age: 68
End: 2023-09-12
Payer: MEDICARE

## 2023-09-12 VITALS
DIASTOLIC BLOOD PRESSURE: 91 MMHG | HEART RATE: 75 BPM | BODY MASS INDEX: 41.75 KG/M2 | SYSTOLIC BLOOD PRESSURE: 167 MMHG | HEIGHT: 67 IN

## 2023-09-12 DIAGNOSIS — Z87.828 HEALED WOUND: Primary | ICD-10-CM

## 2023-09-12 DIAGNOSIS — I89.0 LYMPHEDEMA: ICD-10-CM

## 2023-09-12 PROCEDURE — 99213 PR OFFICE/OUTPT VISIT, EST, LEVL III, 20-29 MIN: ICD-10-PCS | Mod: S$GLB,,, | Performed by: STUDENT IN AN ORGANIZED HEALTH CARE EDUCATION/TRAINING PROGRAM

## 2023-09-12 PROCEDURE — 3077F PR MOST RECENT SYSTOLIC BLOOD PRESSURE >= 140 MM HG: ICD-10-PCS | Mod: CPTII,S$GLB,, | Performed by: STUDENT IN AN ORGANIZED HEALTH CARE EDUCATION/TRAINING PROGRAM

## 2023-09-12 PROCEDURE — 3077F SYST BP >= 140 MM HG: CPT | Mod: CPTII,S$GLB,, | Performed by: STUDENT IN AN ORGANIZED HEALTH CARE EDUCATION/TRAINING PROGRAM

## 2023-09-12 PROCEDURE — 3066F NEPHROPATHY DOC TX: CPT | Mod: CPTII,S$GLB,, | Performed by: STUDENT IN AN ORGANIZED HEALTH CARE EDUCATION/TRAINING PROGRAM

## 2023-09-12 PROCEDURE — 1126F AMNT PAIN NOTED NONE PRSNT: CPT | Mod: CPTII,S$GLB,, | Performed by: STUDENT IN AN ORGANIZED HEALTH CARE EDUCATION/TRAINING PROGRAM

## 2023-09-12 PROCEDURE — 1159F MED LIST DOCD IN RCRD: CPT | Mod: CPTII,S$GLB,, | Performed by: STUDENT IN AN ORGANIZED HEALTH CARE EDUCATION/TRAINING PROGRAM

## 2023-09-12 PROCEDURE — 99999 PR PBB SHADOW E&M-EST. PATIENT-LVL III: ICD-10-PCS | Mod: PBBFAC,,, | Performed by: STUDENT IN AN ORGANIZED HEALTH CARE EDUCATION/TRAINING PROGRAM

## 2023-09-12 PROCEDURE — 3008F BODY MASS INDEX DOCD: CPT | Mod: CPTII,S$GLB,, | Performed by: STUDENT IN AN ORGANIZED HEALTH CARE EDUCATION/TRAINING PROGRAM

## 2023-09-12 PROCEDURE — 3080F DIAST BP >= 90 MM HG: CPT | Mod: CPTII,S$GLB,, | Performed by: STUDENT IN AN ORGANIZED HEALTH CARE EDUCATION/TRAINING PROGRAM

## 2023-09-12 PROCEDURE — 3066F PR DOCUMENTATION OF TREATMENT FOR NEPHROPATHY: ICD-10-PCS | Mod: CPTII,S$GLB,, | Performed by: STUDENT IN AN ORGANIZED HEALTH CARE EDUCATION/TRAINING PROGRAM

## 2023-09-12 PROCEDURE — 99999 PR PBB SHADOW E&M-EST. PATIENT-LVL III: CPT | Mod: PBBFAC,,, | Performed by: STUDENT IN AN ORGANIZED HEALTH CARE EDUCATION/TRAINING PROGRAM

## 2023-09-12 PROCEDURE — 1159F PR MEDICATION LIST DOCUMENTED IN MEDICAL RECORD: ICD-10-PCS | Mod: CPTII,S$GLB,, | Performed by: STUDENT IN AN ORGANIZED HEALTH CARE EDUCATION/TRAINING PROGRAM

## 2023-09-12 PROCEDURE — 3051F PR MOST RECENT HEMOGLOBIN A1C LEVEL 7.0 - < 8.0%: ICD-10-PCS | Mod: CPTII,S$GLB,, | Performed by: STUDENT IN AN ORGANIZED HEALTH CARE EDUCATION/TRAINING PROGRAM

## 2023-09-12 PROCEDURE — 99213 OFFICE O/P EST LOW 20 MIN: CPT | Mod: S$GLB,,, | Performed by: STUDENT IN AN ORGANIZED HEALTH CARE EDUCATION/TRAINING PROGRAM

## 2023-09-12 PROCEDURE — 3051F HG A1C>EQUAL 7.0%<8.0%: CPT | Mod: CPTII,S$GLB,, | Performed by: STUDENT IN AN ORGANIZED HEALTH CARE EDUCATION/TRAINING PROGRAM

## 2023-09-12 PROCEDURE — 3062F PR POS MACROALBUMINURIA RESULT DOCUMENTED/REVIEW: ICD-10-PCS | Mod: CPTII,S$GLB,, | Performed by: STUDENT IN AN ORGANIZED HEALTH CARE EDUCATION/TRAINING PROGRAM

## 2023-09-12 PROCEDURE — 3062F POS MACROALBUMINURIA REV: CPT | Mod: CPTII,S$GLB,, | Performed by: STUDENT IN AN ORGANIZED HEALTH CARE EDUCATION/TRAINING PROGRAM

## 2023-09-12 PROCEDURE — 1126F PR PAIN SEVERITY QUANTIFIED, NO PAIN PRESENT: ICD-10-PCS | Mod: CPTII,S$GLB,, | Performed by: STUDENT IN AN ORGANIZED HEALTH CARE EDUCATION/TRAINING PROGRAM

## 2023-09-12 PROCEDURE — 3008F PR BODY MASS INDEX (BMI) DOCUMENTED: ICD-10-PCS | Mod: CPTII,S$GLB,, | Performed by: STUDENT IN AN ORGANIZED HEALTH CARE EDUCATION/TRAINING PROGRAM

## 2023-09-12 PROCEDURE — 3080F PR MOST RECENT DIASTOLIC BLOOD PRESSURE >= 90 MM HG: ICD-10-PCS | Mod: CPTII,S$GLB,, | Performed by: STUDENT IN AN ORGANIZED HEALTH CARE EDUCATION/TRAINING PROGRAM

## 2023-09-12 NOTE — PROGRESS NOTES
Physical Therapy Daily Treatment Note     Name: Jian JASMINE [facility] Number: [id number]    Therapy Diagnosis:   Encounter Diagnoses   Name Primary?    Venous insufficiency of both lower extremities Yes    Type 2 diabetes mellitus with stage 3 chronic kidney disease, with long-term current use of insulin, unspecified whether stage 3a or 3b CKD        Physician: Savanah Felton DPM    Visit Date: 9/13/2023    Physician: Savanah Felton DPM     Physician Orders: PT Eval and Treat - lymphedema  Medical Diagnosis from Referral: I89.0 (ICD-10-CM) - Lymphedema of both lower extremities    L03.119 (ICD-10-CM) - Cellulitis of lower extremity, unspecified laterality     Evaluation Date: 7/31/2023  Authorization Period Expiration: 12/31/2023  Plan of Care Expiration: 9/10/2023  Visit # / Visits authorized: 5/ 30     Time In: 9:00am  Time Out: 10:00am  Total Billable Time: 60 minutes     Precautions: Standard and Diabetes    Subjective     Pt reports: he went to podiatry and got a nail trim and they said his skin is healing well. He cut the grass the other day and got a few ant bites on his leg. He didn't order the compression yet , had to move funds around but plans to order today .   He was compliant with home exercise program.  Response to previous treatment: notes good response to bandaging.   Functional change: wearing temporary compression     Pain: 0/10  Location: bilateral lower legs     Objective     Pt presents, tubigrip donned to BLEs .   Folded at top - education on fold at foot and ankle   Multiple small scabs/blisters due to ant bites         Treatment:   Jian received the following manual therapy techniques:- Manual Lymphatic Drainage were applied to the: LLE and RLE for 60 minutes, including: MLD and short stretch compression bandaging   Sea cleanse wound cleanser to RLE     MANUAL LYMPHATIC DRAINAGE (MLD):    While supine with LEs elevated stimulation at terminus, along GI region, B inguinal regions,  drainage of entire R LE betina lower leg, ankle, and foot with return proximally,  Use of Aquaphor due to dryness.   Educated in self massage to abdominal areas, B inguinal areas, thigh, and remaining LE within reach.    SEQUENTIAL COMPRESSION PUMP: full leg sleeve applied to L LE  Lymphapress with default setting with distal pressures starting at 45mmHg entire LE simultaneous to MLD to other LE    MULTILAYERED BANDAGING:  issued supplies and bandaged L LE and RLE with cotton stockinette, 2 komprex wedges post malleoli, 2 cellona rolls ankle to knee, 1-8cm and 2- 10cm Rosidal K rolls foot to knee, to leave intact 12-24 hrs as tolerated, discontinue with any problems, return rolled bandages next session. Wash and wear schedules confirmed.   Edema wear size medium -  education on donning/doffing , proper fit and wear schedule . Can apply after removing bandages and wear daily  Xeraform placed over ant bites on RLE    Home Exercises Provided and Patient Education Provided     Performed 9/6/2023:  - Importance of daily use of compression - pt admits difficulty reaching feet and will need assistance   - Demo of inelastic velcro wraps - discussed possible 100$ per leg if no insurance coverage   - Reviewed donning/doffing of velcro wraps   - Assisted with measuring R and LLE today for pt to self purchase   - Measurements and info for self purchase printed and provided   - Pt sized for sigvaris compreflex complete , size XL regular length for R and LLE    Education provided:    Remove if painful   PATIENT/FAMILY Education: bandaging wear schedule,  HEP,  Beginning of self massage,  Self or assisted bandaging, compression options, and Risk reduction    Written Home Exercises Provided: Patient instructed to cont prior HEP.  Exercises were reviewed and Jian was able to demonstrate them prior to the end of the session.  Jian demonstrated good  understanding of the education provided.     Assessment     Pt presents with ant bites  to RLE ( picture as above) . Cleansed with sea cleans and xeraform placed over bites and under bandaging. Educated on risk for infection and needs to closely monitor and keep up with skin care. Pt was encouraged to order compression recommendations when able - was measured for velcro wraps . Education on importance of daily use of compression for management . Therapy feels velcro wraps may be more beneficial as pt has difficulty reaching lower legs and feet.     Pt presents with moderate edema secondary to hx of cellulitis  Pt would benefit from therapy and management of other health concerns to reduce edema, aid in finding compression and preparing for home management     Jian Is progressing well towards his goals.   Pt prognosis is Good.     Pt will continue to benefit from skilled outpatient physical therapy to address the deficits listed in the problem list box on initial evaluation, provide pt/family education and to maximize pt's level of independence in the home and community environment.   Pt's spiritual, cultural and educational needs considered and pt agreeable to plan of care and goals.     Anticipated barriers to physical therapy: skin integrity- will check regularly for skin integrity     Goals:     Short Term Goals: (6 weeks)  1. Patient will show decreased girth in B LE by up to 1 cm to allow for LE symmetry, shoe and clothing choice, and ability to apply needed compression.  (progressing, not met)   2. Patient will demonstrate 100% knowledge of lymphedema precautions and signs of infection to allow for reduced lymphedema risk, infection risk, and/or exacerbation of condition.  (progressing, not met)  3. Patient or caregiver will perform self-bandaging techniques and/or wearing of compression garments to allow for lymphatic drainage support, skin elasticity, and reduction in shape and size of limb. (progressing, not met)  4. Patient will perform self lymph drainage techniques to areas within reach to  enhance lymphatic drainage and skin condition.  (progressing, not met)  5. Patient will tolerate daily activities with multilayered bandaging to allow for lymphatic and venous support.  (progressing, not met)     Long Term Goals: (12  weeks)  1. Patient will show decreased girth in B LE by up to 2 cm  to allow for LE symmetry, shoe and clothing choice, and ability to apply needed compression daily.  (progressing, not met)  2. Patient will show reduction in density to mild or less with improved contour of limb to allow for cosmesis, LE symmetry, infection risk reduction, and clothing and compression choice.   (progressing, not met)  3. Patient to krystal/doff compression garment with daily compliance to assist in lymphedema management, skin elasticity, and tissue density.  (progressing, not met)  4. Pt to show improved postural awareness and alignment.  (progressing, not met)  5. Pt to be I and compliant with HEP to allow for increased function in affected limb.   (progressing, not met)    Plan   Continue PT  2x   weekly for Complete Decongestive Therapy:  Manual lymphatic drainage, Multilayered short stretch bandaging, Pneumatic compression, Therapeutic exercises, Patient education as deemed necessary to achieve stated goals.      Raeann Rodriguez, PTA

## 2023-09-12 NOTE — PROGRESS NOTES
Subjective:     Patient ID: Jian Arrieta is a 68 y.o. male.    Chief Complaint: Follow-up, Foot Ulcer, and Foot Problem    Jian is a 68 y.o. male who presents to the clinic upon referral from Dr. Monica vázquez. provider found  for evaluation and treatment of diabetic feet. Jian has a past medical history of Alcohol abuse, Anasarca (2019), Anemia, Anticoagulant long-term use, Arthropathy associated with neurological disorder (2015), Atherosclerosis, Charcot foot due to diabetes mellitus, Chronic combined systolic and diastolic heart failure (2019), Chronic pancreatitis (2019), CKD (chronic kidney disease) stage 3, GFR 30-59 ml/min, CKD (chronic kidney disease) stage 4, GFR 15-29 ml/min, Colon polyps, Coronary artery disease due to calcified coronary lesion (2015), Diabetic polyneuropathy associated with type 2 diabetes mellitus (2015), Diverticulosis (2019), DM type 2 with diabetic peripheral neuropathy (2019), Encounter for blood transfusion, Essential hypertension (2019), Former smoker (2015), Healed ulcer of left foot on examination (2017), Hematuria, Hydrocele, Hyperphosphatemia, Hypoalbuminemia (2019), Hypocalcemia, Lymphedema of both lower extremities (2019), Mixed hyperlipidemia (2015), Morbid obesity with BMI of 50.0-59.9, adult (2015), Obstruction of right ureteropelvic junction (UPJ) due to stone (2021), Onychomycosis of multiple toenails with type 2 diabetes mellitus and peripheral neuropathy (2017), Perianal cyst, Proteinuria, Pseudocyst of pancreas (2019), Skin cancer, Sleep apnea (2015), Status post bariatric surgery (2016), Type 2 diabetes mellitus, with long-term current use of insulin (2015), and Urinary tract infection. Patient relates no major problem with feet. Only complaints today consist of blister to bottom of his left foot. Relates both of his dogs  over the past week and he has been on his feet  a lot. No further pedal complaints. Has plan to travel to [place] over weekend with his wife for their anniversary.    8/15/23: Seen today, relates he did not go to the [facility], he decided to stay home and elevate his feet. Relates his left leg was itching badly in previous dressing and has new wound to left anterior leg where he scratched. No further pedal complaints. Denies signs of infection. Relates starts in lymphedema clinic in 2 weeks.     9/12/23: Seen today, all wounds are healed. He continues to follow up in lymphedema clinic.     PCP: Leon Barajas, DO    Date Last Seen by PCP:     Current shoe gear: Casual shoes    Hemoglobin A1C   Date Value Ref Range Status   07/06/2023 7.4 (H) 4.0 - 5.6 % Final     Comment:     ADA Screening Guidelines:  5.7-6.4%  Consistent with prediabetes  >or=6.5%  Consistent with diabetes    High levels of fetal hemoglobin interfere with the HbA1C  assay. Heterozygous hemoglobin variants (HbS, HgC, etc)do  not significantly interfere with this assay.   However, presence of multiple variants may affect accuracy.     04/06/2023 8.1 (H) 4.0 - 5.6 % Final     Comment:     ADA Screening Guidelines:  5.7-6.4%  Consistent with prediabetes  >or=6.5%  Consistent with diabetes    High levels of fetal hemoglobin interfere with the HbA1C  assay. Heterozygous hemoglobin variants (HbS, HgC, etc)do  not significantly interfere with this assay.   However, presence of multiple variants may affect accuracy.     03/08/2023 8.9 (H) 4.0 - 5.6 % Final     Comment:     ADA Screening Guidelines:  5.7-6.4%  Consistent with prediabetes  >or=6.5%  Consistent with diabetes    High levels of fetal hemoglobin interfere with the HbA1C  assay. Heterozygous hemoglobin variants (HbS, HgC, etc)do  not significantly interfere with this assay.   However, presence of multiple variants may affect accuracy.           Review of Systems   Constitutional: Negative for chills, decreased appetite, diaphoresis and  fever.   HENT:  Negative for congestion and hearing loss.    Cardiovascular:  Negative for chest pain, claudication and syncope.   Respiratory:  Negative for cough and shortness of breath.    Skin:  Positive for color change, dry skin, nail changes and poor wound healing. Negative for flushing, itching and rash.   Musculoskeletal:  Negative for back pain.   Gastrointestinal:  Negative for nausea and vomiting.   Neurological:  Positive for numbness and paresthesias. Negative for focal weakness and weakness.   Psychiatric/Behavioral:  Negative for altered mental status. The patient is not nervous/anxious.         Objective:     Physical Exam  Constitutional:       General: He is not in acute distress.     Appearance: Normal appearance. He is well-developed. He is not diaphoretic.   Cardiovascular:      Comments: Dorsalis pedis and posterior tibial pulses are within normal limits. Skin temperature is within normal limits. Toes are cool to touch and feet are warm proximally. Hair growth is diminished. Skin is mildly atrophic and with mild hyperpigmentation.lymphedema noted, bilaterally.   Musculoskeletal:         General: No tenderness.      Comments: Adequate joint range of motion without pain, limitation, nor crepitation to foot and ankle joints. Muscle strength is 5/5 in all groups bilaterally.       Feet:      Right foot:      Skin integrity: Dry skin present. No ulcer, blister, erythema or warmth.      Left foot:      Skin integrity: Dry skin present. No ulcer, blister, erythema or warmth.   Skin:     General: Skin is warm and dry.      Capillary Refill: Capillary refill takes less than 2 seconds.      Comments: Skin is warm and dry, no acute signs of infection noted bilaterally      Toenails are well trimmed and of normal morphology    Previous wounds are healed.     Otherwise, no open wounds, macerations or hyperkeratotic lesions, bilaterally            Neurological:      Mental Status: He is alert and oriented  to person, place, and time.      Sensory: Sensory deficit present.      Motor: No abnormal muscle tone.      Comments: Light touch within normal limits. [place] 5.07 monofilamant testing is diminished. Vibratory sensation is diminished bilaterally.   Psychiatric:         Mood and Affect: Mood normal.         Behavior: Behavior normal.         Thought Content: Thought content normal.           Assessment:      Encounter Diagnoses   Name Primary?    Healed wound Yes    Lymphedema        Plan:     Jian was seen today for follow-up, foot ulcer and foot problem.    Diagnoses and all orders for this visit:    Healed wound    Lymphedema        I counseled the patient on his conditions, their implications and medical management.  Previous ulcers are healed  Legs dressed with tubigrip, continue lymphedema management per PT, follow up with PCP for further management  Rest, elevate and avoid salt intake. Continue compression socks at home  Shoe inspection. Diabetic Foot Education. Patient reminded of the importance of good nutrition and blood sugar control to help prevent podiatric complications of diabetes. I discussed with the  patient  signs and symptoms of infection including redness, drainage, purulence, odor, pain, elevated BS, streaking, fever, chills, etc . Patient is to seek medical attention (ER or urgent care) if these symptoms occur    Return to clinic in 4-6 weeks, sooner PRN

## 2023-09-13 ENCOUNTER — CLINICAL SUPPORT (OUTPATIENT)
Dept: REHABILITATION | Facility: HOSPITAL | Age: 68
End: 2023-09-13
Payer: MEDICARE

## 2023-09-13 DIAGNOSIS — E11.22 TYPE 2 DIABETES MELLITUS WITH STAGE 3 CHRONIC KIDNEY DISEASE, WITH LONG-TERM CURRENT USE OF INSULIN, UNSPECIFIED WHETHER STAGE 3A OR 3B CKD: ICD-10-CM

## 2023-09-13 DIAGNOSIS — Z79.4 TYPE 2 DIABETES MELLITUS WITH STAGE 3 CHRONIC KIDNEY DISEASE, WITH LONG-TERM CURRENT USE OF INSULIN, UNSPECIFIED WHETHER STAGE 3A OR 3B CKD: ICD-10-CM

## 2023-09-13 DIAGNOSIS — N18.30 TYPE 2 DIABETES MELLITUS WITH STAGE 3 CHRONIC KIDNEY DISEASE, WITH LONG-TERM CURRENT USE OF INSULIN, UNSPECIFIED WHETHER STAGE 3A OR 3B CKD: ICD-10-CM

## 2023-09-13 DIAGNOSIS — I87.2 VENOUS INSUFFICIENCY OF BOTH LOWER EXTREMITIES: Primary | ICD-10-CM

## 2023-09-13 PROCEDURE — 97140 MANUAL THERAPY 1/> REGIONS: CPT | Mod: CQ

## 2023-09-13 PROCEDURE — 97016 VASOPNEUMATIC DEVICE THERAPY: CPT | Mod: CQ

## 2023-09-15 ENCOUNTER — CLINICAL SUPPORT (OUTPATIENT)
Dept: REHABILITATION | Facility: HOSPITAL | Age: 68
End: 2023-09-15
Payer: MEDICARE

## 2023-09-15 DIAGNOSIS — I87.2 VENOUS INSUFFICIENCY OF BOTH LOWER EXTREMITIES: Primary | ICD-10-CM

## 2023-09-15 DIAGNOSIS — N18.30 TYPE 2 DIABETES MELLITUS WITH STAGE 3 CHRONIC KIDNEY DISEASE, WITH LONG-TERM CURRENT USE OF INSULIN, UNSPECIFIED WHETHER STAGE 3A OR 3B CKD: ICD-10-CM

## 2023-09-15 DIAGNOSIS — Z79.4 TYPE 2 DIABETES MELLITUS WITH STAGE 3 CHRONIC KIDNEY DISEASE, WITH LONG-TERM CURRENT USE OF INSULIN, UNSPECIFIED WHETHER STAGE 3A OR 3B CKD: ICD-10-CM

## 2023-09-15 DIAGNOSIS — E11.22 TYPE 2 DIABETES MELLITUS WITH STAGE 3 CHRONIC KIDNEY DISEASE, WITH LONG-TERM CURRENT USE OF INSULIN, UNSPECIFIED WHETHER STAGE 3A OR 3B CKD: ICD-10-CM

## 2023-09-15 PROCEDURE — 97140 MANUAL THERAPY 1/> REGIONS: CPT | Mod: CQ

## 2023-09-15 PROCEDURE — 97016 VASOPNEUMATIC DEVICE THERAPY: CPT | Mod: CQ

## 2023-09-15 PROCEDURE — 97535 SELF CARE MNGMENT TRAINING: CPT | Mod: CQ

## 2023-09-15 NOTE — PROGRESS NOTES
Physical Therapy Daily Treatment Note     Name: Jian JASMINE [facility] Number: [id number]    Therapy Diagnosis:   Encounter Diagnoses   Name Primary?    Venous insufficiency of both lower extremities Yes    Type 2 diabetes mellitus with stage 3 chronic kidney disease, with long-term current use of insulin, unspecified whether stage 3a or 3b CKD        Physician: Savanah Felton DPM    Visit Date: 9/15/2023    Physician: Savanah Felton DPM     Physician Orders: PT Eval and Treat - lymphedema  Medical Diagnosis from Referral: I89.0 (ICD-10-CM) - Lymphedema of both lower extremities    L03.119 (ICD-10-CM) - Cellulitis of lower extremity, unspecified laterality     Evaluation Date: 7/31/2023  Authorization Period Expiration: 12/31/2023  Plan of Care Expiration: 9/10/2023  Visit # / Visits authorized: 6/ 30     Time In: 9:00am  Time Out: 10:00am  Total Billable Time: 60 minutes     Precautions: Standard and Diabetes    Subjective     Pt reports: he saw dermatology for his nose. They also looked at his leg and wrapped the ant bites with plastic wrap . He hasnt gotten a chance to order the velcro wraps yet.   He was compliant with home exercise program.  Response to previous treatment: notes good response to bandaging.   Functional change: wearing temporary compression     Pain: 0/10  Location: bilateral lower legs     Objective     Pt presents no compression   Multiple small scabs/blisters due to ant bites - improvements noted and healing well       Treatment:   Jian received the following manual therapy techniques:- Manual Lymphatic Drainage were applied to the: LLE and RLE for 50 minutes, including: MLD and short stretch compression bandaging     MANUAL LYMPHATIC DRAINAGE (MLD):    While supine with LEs elevated stimulation at terminus, along GI region, B inguinal regions, drainage of entire LLE betina lower leg, ankle, and foot with return proximally,  Use of Aquaphor due to dryness.   Educated in self massage to  abdominal areas, B inguinal areas, thigh, and remaining LE within reach.    SEQUENTIAL COMPRESSION PUMP: full leg sleeve applied to RLE  Lymphapress with default setting with distal pressures starting at 45mmHg entire LE simultaneous to MLD to other LE    MULTILAYERED BANDAGING:  issued supplies and bandaged L LE and RLE with cotton stockinette, 2 komprex wedges post malleoli, 2 cellona rolls ankle to knee, 1-8cm and 2- 10cm Rosidal K rolls foot to knee, to leave intact 12-24 hrs as tolerated, discontinue with any problems, return rolled bandages next session. Wash and wear schedules confirmed.   Edema wear size medium -  education on donning/doffing , proper fit and wear schedule . Can apply after removing bandages and wear daily  Xeraform placed over ant bites on RLE    Home Exercises Provided and Patient Education Provided     Self care home management for 10'  - Importance of daily use of compression - pt admits difficulty reaching feet and will need assistance   - Assisted with measuring R and LLE for pt to self purchase of inelastic velcro wraps   - Assisted pt with ordering velcro wraps today on [employer]   - Pt purchased velcro wraps for BLEs , XL regular length R and LLE      Education provided:    Remove if painful   PATIENT/FAMILY Education: bandaging wear schedule,  HEP,  Beginning of self massage,  Self or assisted bandaging, compression options, and Risk reduction    Written Home Exercises Provided: Patient instructed to cont prior HEP.  Exercises were reviewed and Jian was able to demonstrate them prior to the end of the session.  Jian demonstrated good  understanding of the education provided.     Assessment     Pt is progressing well with decreased edema noted today as well as improvements in skin integrity. Assisted pt with ordering velcro wraps today through [employer]. Encouraged to bring to next session for education on donning/doffing and to ensure proper fit.  Education on  importance of daily use of compression for management .     Pt presents with moderate edema secondary to hx of cellulitis  Pt would benefit from therapy and management of other health concerns to reduce edema, aid in finding compression and preparing for home management     Jian Is progressing well towards his goals.   Pt prognosis is Good.     Pt will continue to benefit from skilled outpatient physical therapy to address the deficits listed in the problem list box on initial evaluation, provide pt/family education and to maximize pt's level of independence in the home and community environment.   Pt's spiritual, cultural and educational needs considered and pt agreeable to plan of care and goals.     Anticipated barriers to physical therapy: skin integrity- will check regularly for skin integrity     Goals:     Short Term Goals: (6 weeks)  1. Patient will show decreased girth in B LE by up to 1 cm to allow for LE symmetry, shoe and clothing choice, and ability to apply needed compression.  (progressing, not met)   2. Patient will demonstrate 100% knowledge of lymphedema precautions and signs of infection to allow for reduced lymphedema risk, infection risk, and/or exacerbation of condition.  (progressing, not met)  3. Patient or caregiver will perform self-bandaging techniques and/or wearing of compression garments to allow for lymphatic drainage support, skin elasticity, and reduction in shape and size of limb. (progressing, not met)  4. Patient will perform self lymph drainage techniques to areas within reach to enhance lymphatic drainage and skin condition.  (progressing, not met)  5. Patient will tolerate daily activities with multilayered bandaging to allow for lymphatic and venous support.  (progressing, not met)     Long Term Goals: (12  weeks)  1. Patient will show decreased girth in B LE by up to 2 cm  to allow for LE symmetry, shoe and clothing choice, and ability to apply needed compression daily.   (progressing, not met)  2. Patient will show reduction in density to mild or less with improved contour of limb to allow for cosmesis, LE symmetry, infection risk reduction, and clothing and compression choice.   (progressing, not met)  3. Patient to krystal/doff compression garment with daily compliance to assist in lymphedema management, skin elasticity, and tissue density.  (progressing, not met)  4. Pt to show improved postural awareness and alignment.  (progressing, not met)  5. Pt to be I and compliant with HEP to allow for increased function in affected limb.   (progressing, not met)    Plan   Continue PT  2x   weekly for Complete Decongestive Therapy:  Manual lymphatic drainage, Multilayered short stretch bandaging, Pneumatic compression, Therapeutic exercises, Patient education as deemed necessary to achieve stated goals.      Raeann Rodriguez, PTA

## 2023-09-18 ENCOUNTER — CLINICAL SUPPORT (OUTPATIENT)
Dept: REHABILITATION | Facility: HOSPITAL | Age: 68
End: 2023-09-18
Payer: MEDICARE

## 2023-09-18 DIAGNOSIS — E11.22 TYPE 2 DIABETES MELLITUS WITH STAGE 3 CHRONIC KIDNEY DISEASE, WITH LONG-TERM CURRENT USE OF INSULIN, UNSPECIFIED WHETHER STAGE 3A OR 3B CKD: ICD-10-CM

## 2023-09-18 DIAGNOSIS — I87.2 VENOUS INSUFFICIENCY OF BOTH LOWER EXTREMITIES: Primary | ICD-10-CM

## 2023-09-18 DIAGNOSIS — N18.30 TYPE 2 DIABETES MELLITUS WITH STAGE 3 CHRONIC KIDNEY DISEASE, WITH LONG-TERM CURRENT USE OF INSULIN, UNSPECIFIED WHETHER STAGE 3A OR 3B CKD: ICD-10-CM

## 2023-09-18 DIAGNOSIS — Z79.4 TYPE 2 DIABETES MELLITUS WITH STAGE 3 CHRONIC KIDNEY DISEASE, WITH LONG-TERM CURRENT USE OF INSULIN, UNSPECIFIED WHETHER STAGE 3A OR 3B CKD: ICD-10-CM

## 2023-09-18 PROCEDURE — 29581 APPL MULTLAYER CMPRN SYS LEG: CPT

## 2023-09-18 PROCEDURE — 97140 MANUAL THERAPY 1/> REGIONS: CPT | Mod: 59

## 2023-09-18 NOTE — PROGRESS NOTES
Physical Therapy Daily Treatment Note/ Progress Note      Name: Jian JASMINE [facility]  Clinic Number: [id number]    Therapy Diagnosis:   Encounter Diagnoses   Name Primary?    Venous insufficiency of both lower extremities Yes    Type 2 diabetes mellitus with stage 3 chronic kidney disease, with long-term current use of insulin, unspecified whether stage 3a or 3b CKD        Physician: Savanah Felton DPM    Visit Date: 9/18/2023    Physician: Savanah Felton DPM     Physician Orders: PT Eval and Treat - lymphedema  Medical Diagnosis from Referral: I89.0 (ICD-10-CM) - Lymphedema of both lower extremities    L03.119 (ICD-10-CM) - Cellulitis of lower extremity, unspecified laterality     Evaluation Date: 7/31/2023  Authorization Period Expiration: 12/31/2023  Plan of Care Expiration: 10/10/2023  Visit # / Visits authorized: 7/ 30     Time In: 9:00am  Time Out: 10:00am  Total Billable Time: 60 minutes     Precautions: Standard and Diabetes    Subjective     Pt reports: His legs are looking better, he has noticed a difference   He was compliant with home exercise program.  Response to previous treatment: notes good response to bandaging.   Functional change: wearing temporary compression     Pain: 0/10  Location: bilateral lower legs     Objective     Pt presents no compression   Multiple small scabs/blisters due to ant bites - improvements noted and healing well               Treatment:   Jian received the following manual therapy techniques:- Manual Lymphatic Drainage were applied to the: LLE and RLE for 60 minutes, including: MLD and short stretch compression bandaging     MANUAL LYMPHATIC DRAINAGE (MLD):    While supine with LEs elevated stimulation at terminus, along GI region, B inguinal regions, drainage of entire RLE betina lower leg, ankle, and foot with return proximally,  Use of Aquaphor due to dryness.   Educated in self massage to abdominal areas, B inguinal areas, thigh, and remaining LE within  reach.    SEQUENTIAL COMPRESSION PUMP: full leg sleeve applied to LLE  Lymphapress with default setting with distal pressures starting at 45mmHg entire LE simultaneous to MLD to other LE    MULTILAYERED BANDAGING:  issued supplies and bandaged L LE and RLE with cotton stockinette, 2 komprex wedges post malleoli, 2 cellona rolls ankle to knee, 1-8cm and 2- 10cm Rosidal K rolls foot to knee, to leave intact 12-24 hrs as tolerated, discontinue with any problems, return rolled bandages next session. Wash and wear schedules confirmed.   Edema wear size medium -  education on donning/doffing , proper fit and wear schedule . Can apply after removing bandages and wear daily  Xeraform placed over ant bites on RLE    Home Exercises Provided and Patient Education Provided     Self care home management for 10'  - Importance of daily use of compression - pt admits difficulty reaching feet and will need assistance   - Assisted with measuring R and LLE for pt to self purchase of inelastic velcro wraps   - Assisted pt with ordering velcro wraps today on [employer]   - Pt purchased velcro wraps for BLEs , XL regular length R and LLE      Education provided:    Remove if painful   PATIENT/FAMILY Education: bandaging wear schedule,  HEP,  Beginning of self massage,  Self or assisted bandaging, compression options, and Risk reduction    Written Home Exercises Provided: Patient instructed to cont prior HEP.  Exercises were reviewed and Jian was able to demonstrate them prior to the end of the session.  Jian demonstrated good  understanding of the education provided.     Assessment     Measurements taken today show good decreases on the LLE and no improvement on the RLE. Pt would benefit from finding compression to maintain the reductions seen so far.     Pt presents with moderate edema secondary to hx of cellulitis  Pt would benefit from therapy and management of other health concerns to reduce edema, aid in finding  compression and preparing for home management     Jian Is progressing well towards his goals.   Pt prognosis is Good.     Pt will continue to benefit from skilled outpatient physical therapy to address the deficits listed in the problem list box on initial evaluation, provide pt/family education and to maximize pt's level of independence in the home and community environment.   Pt's spiritual, cultural and educational needs considered and pt agreeable to plan of care and goals.     Anticipated barriers to physical therapy: skin integrity- will check regularly for skin integrity     Goals:     Short Term Goals: (6 weeks)  1. Patient will show decreased girth in B LE by up to 1 cm to allow for LE symmetry, shoe and clothing choice, and ability to apply needed compression.  (progressing, not met)   2. Patient will demonstrate 100% knowledge of lymphedema precautions and signs of infection to allow for reduced lymphedema risk, infection risk, and/or exacerbation of condition.  (progressing, not met)  3. Patient or caregiver will perform self-bandaging techniques and/or wearing of compression garments to allow for lymphatic drainage support, skin elasticity, and reduction in shape and size of limb. (progressing, not met)  4. Patient will perform self lymph drainage techniques to areas within reach to enhance lymphatic drainage and skin condition.  (progressing, not met)  5. Patient will tolerate daily activities with multilayered bandaging to allow for lymphatic and venous support.  (progressing, not met)     Long Term Goals: (12  weeks)  1. Patient will show decreased girth in B LE by up to 2 cm  to allow for LE symmetry, shoe and clothing choice, and ability to apply needed compression daily.  (progressing, not met)  2. Patient will show reduction in density to mild or less with improved contour of limb to allow for cosmesis, LE symmetry, infection risk reduction, and clothing and compression choice.   (progressing,  not met)  3. Patient to krystal/doff compression garment with daily compliance to assist in lymphedema management, skin elasticity, and tissue density.  (progressing, not met)  4. Pt to show improved postural awareness and alignment.  (progressing, not met)  5. Pt to be I and compliant with HEP to allow for increased function in affected limb.   (progressing, not met)    Plan   Continue PT  2x   weekly for Complete Decongestive Therapy:  Manual lymphatic drainage, Multilayered short stretch bandaging, Pneumatic compression, Therapeutic exercises, Patient education as deemed necessary to achieve stated goals.      Luisa Thompson, PT

## 2023-09-20 ENCOUNTER — CLINICAL SUPPORT (OUTPATIENT)
Dept: REHABILITATION | Facility: HOSPITAL | Age: 68
End: 2023-09-20
Payer: MEDICARE

## 2023-09-20 DIAGNOSIS — I87.2 VENOUS INSUFFICIENCY OF BOTH LOWER EXTREMITIES: Primary | ICD-10-CM

## 2023-09-20 DIAGNOSIS — E11.22 TYPE 2 DIABETES MELLITUS WITH STAGE 3 CHRONIC KIDNEY DISEASE, WITH LONG-TERM CURRENT USE OF INSULIN, UNSPECIFIED WHETHER STAGE 3A OR 3B CKD: ICD-10-CM

## 2023-09-20 DIAGNOSIS — Z79.4 TYPE 2 DIABETES MELLITUS WITH STAGE 3 CHRONIC KIDNEY DISEASE, WITH LONG-TERM CURRENT USE OF INSULIN, UNSPECIFIED WHETHER STAGE 3A OR 3B CKD: ICD-10-CM

## 2023-09-20 DIAGNOSIS — N18.30 TYPE 2 DIABETES MELLITUS WITH STAGE 3 CHRONIC KIDNEY DISEASE, WITH LONG-TERM CURRENT USE OF INSULIN, UNSPECIFIED WHETHER STAGE 3A OR 3B CKD: ICD-10-CM

## 2023-09-20 PROCEDURE — 97140 MANUAL THERAPY 1/> REGIONS: CPT

## 2023-09-20 PROCEDURE — 29581 APPL MULTLAYER CMPRN SYS LEG: CPT

## 2023-09-20 PROCEDURE — 97016 VASOPNEUMATIC DEVICE THERAPY: CPT

## 2023-09-20 NOTE — PROGRESS NOTES
Physical Therapy Daily Treatment Note     Name: Jian JASMINE [facility] Number: [id number]    Therapy Diagnosis:   Encounter Diagnoses   Name Primary?    Venous insufficiency of both lower extremities Yes    Type 2 diabetes mellitus with stage 3 chronic kidney disease, with long-term current use of insulin, unspecified whether stage 3a or 3b CKD          Physician: Savanah Felton DPM    Visit Date: 9/20/2023    Physician: Savanah Felton DPM     Physician Orders: PT Eval and Treat - lymphedema  Medical Diagnosis from Referral: I89.0 (ICD-10-CM) - Lymphedema of both lower extremities    L03.119 (ICD-10-CM) - Cellulitis of lower extremity, unspecified laterality     Evaluation Date: 7/31/2023  Authorization Period Expiration: 12/31/2023  Plan of Care Expiration: 10/10/2023  Visit # / Visits authorized: 8/ 30     Time In: 9:00am  Time Out: 10:00am  Total Billable Time: 60 minutes     Precautions: Standard and Diabetes    Subjective     Pt reports: Doing ok today  He was compliant with home exercise program.  Response to previous treatment: notes good response to bandaging.   Functional change: wearing temporary compression     Pain: 0/10  Location: bilateral lower legs     Objective     Pt presents no compression   Multiple small scabs/blisters due to ant bites - improvements noted and healing well               Treatment:   Jian received the following manual therapy techniques:- Manual Lymphatic Drainage were applied to the: LLE and RLE for 60 minutes, including: MLD and short stretch compression bandaging     MANUAL LYMPHATIC DRAINAGE (MLD):    While supine with LEs elevated stimulation at terminus, along GI region, B inguinal regions, drainage of entire RLE betina lower leg, ankle, and foot with return proximally,  Use of Aquaphor due to dryness.   Educated in self massage to abdominal areas, B inguinal areas, thigh, and remaining LE within reach.    SEQUENTIAL COMPRESSION PUMP: full leg sleeve applied to  LLE  Lymphapress with default setting with distal pressures starting at 45mmHg entire LE simultaneous to MLD to other LE    MULTILAYERED BANDAGING:  issued supplies and bandaged L LE and RLE with cotton stockinette, 2 komprex wedges post malleoli, 2 cellona rolls ankle to knee, 1-8cm and 2- 10cm Rosidal K rolls foot to knee, to leave intact 12-24 hrs as tolerated, discontinue with any problems, return rolled bandages next session. Wash and wear schedules confirmed.   Edema wear size medium -  education on donning/doffing , proper fit and wear schedule . Can apply after removing bandages and wear daily  Xeraform placed over ant bites on RLE    Home Exercises Provided and Patient Education Provided     Self care home management for 10'  - Importance of daily use of compression - pt admits difficulty reaching feet and will need assistance   - Assisted with measuring R and LLE for pt to self purchase of inelastic velcro wraps   - Assisted pt with ordering velcro wraps today on [employer]   - Pt purchased velcro wraps for BLEs , XL regular length R and LLE      Education provided:    Remove if painful   PATIENT/FAMILY Education: bandaging wear schedule,  HEP,  Beginning of self massage,  Self or assisted bandaging, compression options, and Risk reduction    Written Home Exercises Provided: Patient instructed to cont prior HEP.  Exercises were reviewed and Jian was able to demonstrate them prior to the end of the session.  Jian demonstrated good  understanding of the education provided.     Assessment     Pt tolerated treatment well with no reports of pain. Pt has been complaint with the use of velcro wrap. Pt continues to show excellent decreases and improvement in skin integrity. Will continue to progress     Pt presents with moderate edema secondary to hx of cellulitis  Pt would benefit from therapy and management of other health concerns to reduce edema, aid in finding compression and preparing for home  management     Jian Is progressing well towards his goals.   Pt prognosis is Good.     Pt will continue to benefit from skilled outpatient physical therapy to address the deficits listed in the problem list box on initial evaluation, provide pt/family education and to maximize pt's level of independence in the home and community environment.   Pt's spiritual, cultural and educational needs considered and pt agreeable to plan of care and goals.     Anticipated barriers to physical therapy: skin integrity- will check regularly for skin integrity     Goals:     Short Term Goals: (6 weeks)  1. Patient will show decreased girth in B LE by up to 1 cm to allow for LE symmetry, shoe and clothing choice, and ability to apply needed compression.  (progressing, not met)   2. Patient will demonstrate 100% knowledge of lymphedema precautions and signs of infection to allow for reduced lymphedema risk, infection risk, and/or exacerbation of condition.  (progressing, not met)  3. Patient or caregiver will perform self-bandaging techniques and/or wearing of compression garments to allow for lymphatic drainage support, skin elasticity, and reduction in shape and size of limb. (progressing, not met)  4. Patient will perform self lymph drainage techniques to areas within reach to enhance lymphatic drainage and skin condition.  (progressing, not met)  5. Patient will tolerate daily activities with multilayered bandaging to allow for lymphatic and venous support.  (progressing, not met)     Long Term Goals: (12  weeks)  1. Patient will show decreased girth in B LE by up to 2 cm  to allow for LE symmetry, shoe and clothing choice, and ability to apply needed compression daily.  (progressing, not met)  2. Patient will show reduction in density to mild or less with improved contour of limb to allow for cosmesis, LE symmetry, infection risk reduction, and clothing and compression choice.   (progressing, not met)  3. Patient to krystal/doff  compression garment with daily compliance to assist in lymphedema management, skin elasticity, and tissue density.  (progressing, not met)  4. Pt to show improved postural awareness and alignment.  (progressing, not met)  5. Pt to be I and compliant with HEP to allow for increased function in affected limb.   (progressing, not met)    Plan   Continue PT  2x   weekly for Complete Decongestive Therapy:  Manual lymphatic drainage, Multilayered short stretch bandaging, Pneumatic compression, Therapeutic exercises, Patient education as deemed necessary to achieve stated goals.      Luisa Thompson, PT

## 2023-09-25 ENCOUNTER — CLINICAL SUPPORT (OUTPATIENT)
Dept: REHABILITATION | Facility: HOSPITAL | Age: 68
End: 2023-09-25
Payer: MEDICARE

## 2023-09-25 DIAGNOSIS — E11.22 TYPE 2 DIABETES MELLITUS WITH STAGE 3 CHRONIC KIDNEY DISEASE, WITH LONG-TERM CURRENT USE OF INSULIN, UNSPECIFIED WHETHER STAGE 3A OR 3B CKD: ICD-10-CM

## 2023-09-25 DIAGNOSIS — Z79.4 TYPE 2 DIABETES MELLITUS WITH STAGE 3 CHRONIC KIDNEY DISEASE, WITH LONG-TERM CURRENT USE OF INSULIN, UNSPECIFIED WHETHER STAGE 3A OR 3B CKD: ICD-10-CM

## 2023-09-25 DIAGNOSIS — I87.2 VENOUS INSUFFICIENCY OF BOTH LOWER EXTREMITIES: Primary | ICD-10-CM

## 2023-09-25 DIAGNOSIS — N18.30 TYPE 2 DIABETES MELLITUS WITH STAGE 3 CHRONIC KIDNEY DISEASE, WITH LONG-TERM CURRENT USE OF INSULIN, UNSPECIFIED WHETHER STAGE 3A OR 3B CKD: ICD-10-CM

## 2023-09-25 PROCEDURE — 29581 APPL MULTLAYER CMPRN SYS LEG: CPT

## 2023-09-25 PROCEDURE — 97140 MANUAL THERAPY 1/> REGIONS: CPT | Mod: 59

## 2023-09-25 PROCEDURE — 97016 VASOPNEUMATIC DEVICE THERAPY: CPT

## 2023-09-25 NOTE — PROGRESS NOTES
Physical Therapy Daily Treatment Note     Name: Jian JASMINE [facility] Number: [id number]    Therapy Diagnosis:   Encounter Diagnoses   Name Primary?    Venous insufficiency of both lower extremities Yes    Type 2 diabetes mellitus with stage 3 chronic kidney disease, with long-term current use of insulin, unspecified whether stage 3a or 3b CKD          Physician: Savanah Felton DPM    Visit Date: 9/25/2023    Physician: Savanah Felton DPM     Physician Orders: PT Eval and Treat - lymphedema  Medical Diagnosis from Referral: I89.0 (ICD-10-CM) - Lymphedema of both lower extremities    L03.119 (ICD-10-CM) - Cellulitis of lower extremity, unspecified laterality     Evaluation Date: 7/31/2023  Authorization Period Expiration: 12/31/2023  Plan of Care Expiration: 10/10/2023  Visit # / Visits authorized: 9/ 30     Time In: 9:00am  Time Out: 10:00am  Total Billable Time: 60 minutes     Precautions: Standard and Diabetes    Subjective     Pt reports: is doing ok, doesn't wear his compression when he is mowing the yard   He was compliant with home exercise program.  Response to previous treatment: notes good response to bandaging.   Functional change: wearing temporary compression     Pain: 0/10  Location: bilateral lower legs     Objective     Pt presents no compression   Multiple small scabs/blisters due to ant bites - improvements noted and healing well               Treatment:   Jian received the following manual therapy techniques:- Manual Lymphatic Drainage were applied to the: LLE and RLE for 60 minutes, including: MLD and short stretch compression bandaging     MANUAL LYMPHATIC DRAINAGE (MLD):    While supine with LEs elevated stimulation at terminus, along GI region, B inguinal regions, drainage of entire RLE betina lower leg, ankle, and foot with return proximally,  Use of Aquaphor due to dryness.   Educated in self massage to abdominal areas, B inguinal areas, thigh, and remaining LE within  reach.    SEQUENTIAL COMPRESSION PUMP: full leg sleeve applied to LLE  Lymphapress with default setting with distal pressures starting at 45mmHg entire LE simultaneous to MLD to other LE    MULTILAYERED BANDAGING:  issued supplies and bandaged L LE and RLE with cotton stockinette, 2 komprex wedges post malleoli, 2 cellona rolls ankle to knee, 1-8cm and 2- 10cm Rosidal K rolls foot to knee, to leave intact 12-24 hrs as tolerated, discontinue with any problems, return rolled bandages next session. Wash and wear schedules confirmed.   Edema wear size medium -  education on donning/doffing , proper fit and wear schedule . Can apply after removing bandages and wear daily  Xeraform placed over ant bites on RLE    Home Exercises Provided and Patient Education Provided     Self care home management for 10'  - Importance of daily use of compression - pt admits difficulty reaching feet and will need assistance   - Assisted with measuring R and LLE for pt to self purchase of inelastic velcro wraps   - Assisted pt with ordering velcro wraps today on [employer]   - Pt purchased velcro wraps for BLEs , XL regular length R and LLE      Education provided:    Remove if painful   PATIENT/FAMILY Education: bandaging wear schedule,  HEP,  Beginning of self massage,  Self or assisted bandaging, compression options, and Risk reduction    Written Home Exercises Provided: Patient instructed to cont prior HEP.  Exercises were reviewed and Jian was able to demonstrate them prior to the end of the session.  Jian demonstrated good  understanding of the education provided.     Assessment     Pt tolerated treatment well with no reports of pain. Pt was educated on wearing velcro wraps when not doing yard work. Will continue to progress     Pt presents with moderate edema secondary to hx of cellulitis  Pt would benefit from therapy and management of other health concerns to reduce edema, aid in finding compression and preparing for home  management     Jian Is progressing well towards his goals.   Pt prognosis is Good.     Pt will continue to benefit from skilled outpatient physical therapy to address the deficits listed in the problem list box on initial evaluation, provide pt/family education and to maximize pt's level of independence in the home and community environment.   Pt's spiritual, cultural and educational needs considered and pt agreeable to plan of care and goals.     Anticipated barriers to physical therapy: skin integrity- will check regularly for skin integrity     Goals:     Short Term Goals: (6 weeks)  1. Patient will show decreased girth in B LE by up to 1 cm to allow for LE symmetry, shoe and clothing choice, and ability to apply needed compression.  (progressing, not met)   2. Patient will demonstrate 100% knowledge of lymphedema precautions and signs of infection to allow for reduced lymphedema risk, infection risk, and/or exacerbation of condition.  (progressing, not met)  3. Patient or caregiver will perform self-bandaging techniques and/or wearing of compression garments to allow for lymphatic drainage support, skin elasticity, and reduction in shape and size of limb. (progressing, not met)  4. Patient will perform self lymph drainage techniques to areas within reach to enhance lymphatic drainage and skin condition.  (progressing, not met)  5. Patient will tolerate daily activities with multilayered bandaging to allow for lymphatic and venous support.  (progressing, not met)     Long Term Goals: (12  weeks)  1. Patient will show decreased girth in B LE by up to 2 cm  to allow for LE symmetry, shoe and clothing choice, and ability to apply needed compression daily.  (progressing, not met)  2. Patient will show reduction in density to mild or less with improved contour of limb to allow for cosmesis, LE symmetry, infection risk reduction, and clothing and compression choice.   (progressing, not met)  3. Patient to krystal/doff  compression garment with daily compliance to assist in lymphedema management, skin elasticity, and tissue density.  (progressing, not met)  4. Pt to show improved postural awareness and alignment.  (progressing, not met)  5. Pt to be I and compliant with HEP to allow for increased function in affected limb.   (progressing, not met)    Plan   Continue PT  2x   weekly for Complete Decongestive Therapy:  Manual lymphatic drainage, Multilayered short stretch bandaging, Pneumatic compression, Therapeutic exercises, Patient education as deemed necessary to achieve stated goals.      Luisa Thompson, PT

## 2023-10-02 ENCOUNTER — CLINICAL SUPPORT (OUTPATIENT)
Dept: REHABILITATION | Facility: HOSPITAL | Age: 68
End: 2023-10-02
Payer: MEDICARE

## 2023-10-02 DIAGNOSIS — N18.30 TYPE 2 DIABETES MELLITUS WITH STAGE 3 CHRONIC KIDNEY DISEASE, WITH LONG-TERM CURRENT USE OF INSULIN, UNSPECIFIED WHETHER STAGE 3A OR 3B CKD: ICD-10-CM

## 2023-10-02 DIAGNOSIS — I87.2 VENOUS INSUFFICIENCY OF BOTH LOWER EXTREMITIES: Primary | ICD-10-CM

## 2023-10-02 DIAGNOSIS — Z79.4 TYPE 2 DIABETES MELLITUS WITH STAGE 3 CHRONIC KIDNEY DISEASE, WITH LONG-TERM CURRENT USE OF INSULIN, UNSPECIFIED WHETHER STAGE 3A OR 3B CKD: ICD-10-CM

## 2023-10-02 DIAGNOSIS — E11.22 TYPE 2 DIABETES MELLITUS WITH STAGE 3 CHRONIC KIDNEY DISEASE, WITH LONG-TERM CURRENT USE OF INSULIN, UNSPECIFIED WHETHER STAGE 3A OR 3B CKD: ICD-10-CM

## 2023-10-02 PROCEDURE — 29581 APPL MULTLAYER CMPRN SYS LEG: CPT

## 2023-10-02 PROCEDURE — 97140 MANUAL THERAPY 1/> REGIONS: CPT | Mod: 59

## 2023-10-02 PROCEDURE — 97016 VASOPNEUMATIC DEVICE THERAPY: CPT

## 2023-10-02 NOTE — PROGRESS NOTES
Physical Therapy Daily Treatment Note     Name: Jian JASMINE [facility] Number: [id number]    Therapy Diagnosis:   Encounter Diagnoses   Name Primary?    Venous insufficiency of both lower extremities Yes    Type 2 diabetes mellitus with stage 3 chronic kidney disease, with long-term current use of insulin, unspecified whether stage 3a or 3b CKD          Physician: Savanah Felton DPM    Visit Date: 10/2/2023    Physician: Savanah Felton DPM     Physician Orders: PT Eval and Treat - lymphedema  Medical Diagnosis from Referral: I89.0 (ICD-10-CM) - Lymphedema of both lower extremities    L03.119 (ICD-10-CM) - Cellulitis of lower extremity, unspecified laterality     Evaluation Date: 7/31/2023  Authorization Period Expiration: 12/31/2023  Plan of Care Expiration: 10/10/2023  Visit # / Visits authorized: 10/ 30     Time In: 9:03am  Time Out: 10:00am  Total Billable Time: 57 minutes     Precautions: Standard and Diabetes    Subjective     Pt reports: he hasn't been wearing his velcro wraps due to a very busy weekend but he has been wearing his tubigrip   He was compliant with home exercise program.  Response to previous treatment: notes good response to bandaging.   Functional change: wearing temporary compression     Pain: 0/10  Location: bilateral lower legs     Objective     Small scratch on LLE               Treatment:   Jian received the following manual therapy techniques:- Manual Lymphatic Drainage were applied to the: LLE and RLE for 57 minutes, including: MLD and short stretch compression bandaging     MANUAL LYMPHATIC DRAINAGE (MLD):    While supine with LEs elevated stimulation at terminus, along GI region, B inguinal regions, drainage of entire LLE betina lower leg, ankle, and foot with return proximally,  Use of Aquaphor due to dryness.   Educated in self massage to abdominal areas, B inguinal areas, thigh, and remaining LE within reach.    SEQUENTIAL COMPRESSION PUMP: full leg sleeve applied to  RLE  Lymphapress with default setting with distal pressures starting at 45mmHg entire LE simultaneous to MLD to other LE    MULTILAYERED BANDAGING:  issued supplies and bandaged L LE and RLE with cotton stockinette, 2 komprex wedges post malleoli, 2 cellona rolls ankle to knee, 1-8cm and 2- 10cm Rosidal K rolls foot to knee, to leave intact 12-24 hrs as tolerated, discontinue with any problems, return rolled bandages next session. Wash and wear schedules confirmed.   Edema wear size medium -  education on donning/doffing , proper fit and wear schedule . Can apply after removing bandages and wear daily  Xeraform placed over L lower legs     Home Exercises Provided and Patient Education Provided     Self care home management for 10'  - Importance of daily use of compression - pt admits difficulty reaching feet and will need assistance   - Assisted with measuring R and LLE for pt to self purchase of inelastic velcro wraps   - Assisted pt with ordering velcro wraps today on [employer]   - Pt purchased velcro wraps for BLEs , XL regular length R and LLE      Education provided:    Remove if painful   PATIENT/FAMILY Education: bandaging wear schedule,  HEP,  Beginning of self massage,  Self or assisted bandaging, compression options, and Risk reduction    Written Home Exercises Provided: Patient instructed to cont prior HEP.  Exercises were reviewed and Jian was able to demonstrate them prior to the end of the session.  Jian demonstrated good  understanding of the education provided.     Assessment     Pt tolerated treatment well with no reports of pain. Xeroform applied to pt's LLE over small scratch which was avoided with MLD.    Pt presents with moderate edema secondary to hx of cellulitis  Pt would benefit from therapy and management of other health concerns to reduce edema, aid in finding compression and preparing for home management     Jian Is progressing well towards his goals.   Pt prognosis is Good.      Pt will continue to benefit from skilled outpatient physical therapy to address the deficits listed in the problem list box on initial evaluation, provide pt/family education and to maximize pt's level of independence in the home and community environment.   Pt's spiritual, cultural and educational needs considered and pt agreeable to plan of care and goals.     Anticipated barriers to physical therapy: skin integrity- will check regularly for skin integrity     Goals:     Short Term Goals: (6 weeks)  1. Patient will show decreased girth in B LE by up to 1 cm to allow for LE symmetry, shoe and clothing choice, and ability to apply needed compression.  (progressing, not met)   2. Patient will demonstrate 100% knowledge of lymphedema precautions and signs of infection to allow for reduced lymphedema risk, infection risk, and/or exacerbation of condition.  (progressing, not met)  3. Patient or caregiver will perform self-bandaging techniques and/or wearing of compression garments to allow for lymphatic drainage support, skin elasticity, and reduction in shape and size of limb. (progressing, not met)  4. Patient will perform self lymph drainage techniques to areas within reach to enhance lymphatic drainage and skin condition.  (progressing, not met)  5. Patient will tolerate daily activities with multilayered bandaging to allow for lymphatic and venous support.  (progressing, not met)     Long Term Goals: (12  weeks)  1. Patient will show decreased girth in B LE by up to 2 cm  to allow for LE symmetry, shoe and clothing choice, and ability to apply needed compression daily.  (progressing, not met)  2. Patient will show reduction in density to mild or less with improved contour of limb to allow for cosmesis, LE symmetry, infection risk reduction, and clothing and compression choice.   (progressing, not met)  3. Patient to krystal/doff compression garment with daily compliance to assist in lymphedema management, skin  elasticity, and tissue density.  (progressing, not met)  4. Pt to show improved postural awareness and alignment.  (progressing, not met)  5. Pt to be I and compliant with HEP to allow for increased function in affected limb.   (progressing, not met)    Plan   Continue PT  2x   weekly for Complete Decongestive Therapy:  Manual lymphatic drainage, Multilayered short stretch bandaging, Pneumatic compression, Therapeutic exercises, Patient education as deemed necessary to achieve stated goals.      Luisa Thompson, PT

## 2023-10-04 ENCOUNTER — CLINICAL SUPPORT (OUTPATIENT)
Dept: REHABILITATION | Facility: HOSPITAL | Age: 68
End: 2023-10-04
Payer: MEDICARE

## 2023-10-04 DIAGNOSIS — I87.2 VENOUS INSUFFICIENCY OF BOTH LOWER EXTREMITIES: Primary | ICD-10-CM

## 2023-10-04 DIAGNOSIS — E11.22 TYPE 2 DIABETES MELLITUS WITH STAGE 3 CHRONIC KIDNEY DISEASE, WITH LONG-TERM CURRENT USE OF INSULIN, UNSPECIFIED WHETHER STAGE 3A OR 3B CKD: ICD-10-CM

## 2023-10-04 DIAGNOSIS — Z79.4 TYPE 2 DIABETES MELLITUS WITH STAGE 3 CHRONIC KIDNEY DISEASE, WITH LONG-TERM CURRENT USE OF INSULIN, UNSPECIFIED WHETHER STAGE 3A OR 3B CKD: ICD-10-CM

## 2023-10-04 DIAGNOSIS — N18.30 TYPE 2 DIABETES MELLITUS WITH STAGE 3 CHRONIC KIDNEY DISEASE, WITH LONG-TERM CURRENT USE OF INSULIN, UNSPECIFIED WHETHER STAGE 3A OR 3B CKD: ICD-10-CM

## 2023-10-04 PROCEDURE — 97140 MANUAL THERAPY 1/> REGIONS: CPT

## 2023-10-04 PROCEDURE — 29581 APPL MULTLAYER CMPRN SYS LEG: CPT

## 2023-10-04 PROCEDURE — 97016 VASOPNEUMATIC DEVICE THERAPY: CPT

## 2023-10-04 NOTE — PROGRESS NOTES
See evaluation in POC for goals and assessment     Eval Date: 10/11/2023    Luisa Thompson, PT, DPT, CLT

## 2023-10-06 ENCOUNTER — TELEPHONE (OUTPATIENT)
Dept: UROLOGY | Facility: CLINIC | Age: 68
End: 2023-10-06
Payer: MEDICARE

## 2023-10-06 DIAGNOSIS — N20.0 BILATERAL KIDNEY STONES: Primary | ICD-10-CM

## 2023-10-06 NOTE — TELEPHONE ENCOUNTER
Pt went to  [facility] last night for what he thought was a kidney stone attack. They did a ctscan which did not show kidney stone obstruction. He is still with pain and would like to see dr. Diaz. He is over due for his yearly visit for hx of stones. Appt' made with [facility] and to see dr. Diaz on Monday at [facility].

## 2023-10-06 NOTE — TELEPHONE ENCOUNTER
----- Message from Anna Singh LPN sent at 10/6/2023  9:02 AM CDT -----  Regarding: FW: Same Day Appt  Contact: pt @[phone]    ----- Message -----  From: Erika Ruiz  Sent: 10/6/2023   8:45 AM CDT  To: [facility] Staff  Subject: Same Day Appt                                    Pt is calling to speak to someone in the office; asking to be seen today. No available appts in Epic. Pt is willing to see any provider that is available today. Please call soon. Thanks.         Patient's DX: Side Pain( pt went to the [facility] on Tuesday and received a shot of Morphine)         Communication Preference:[phone]         Additional Information: Pt would like orders sent to  [facility] location . Pt  thinks its Kidney Stones and Urethra is swollen.

## 2023-10-09 ENCOUNTER — HOSPITAL ENCOUNTER (OUTPATIENT)
Dept: RADIOLOGY | Facility: HOSPITAL | Age: 68
Discharge: HOME | DRG: 660 | End: 2023-10-09
Attending: UROLOGY
Payer: MEDICARE

## 2023-10-09 ENCOUNTER — OFFICE VISIT (OUTPATIENT)
Dept: UROLOGY | Facility: CLINIC | Age: 68
DRG: 660 | End: 2023-10-09
Payer: MEDICARE

## 2023-10-09 ENCOUNTER — TELEPHONE (OUTPATIENT)
Dept: INTERNAL MEDICINE | Facility: CLINIC | Age: 68
End: 2023-10-09
Payer: MEDICARE

## 2023-10-09 ENCOUNTER — CLINICAL SUPPORT (OUTPATIENT)
Dept: REHABILITATION | Facility: HOSPITAL | Age: 68
End: 2023-10-09
Payer: MEDICARE

## 2023-10-09 ENCOUNTER — TELEPHONE (OUTPATIENT)
Dept: UROLOGY | Facility: CLINIC | Age: 68
End: 2023-10-09

## 2023-10-09 VITALS
DIASTOLIC BLOOD PRESSURE: 101 MMHG | HEART RATE: 76 BPM | BODY MASS INDEX: 43.85 KG/M2 | WEIGHT: 280 LBS | SYSTOLIC BLOOD PRESSURE: 174 MMHG

## 2023-10-09 DIAGNOSIS — R10.9 RIGHT FLANK PAIN: ICD-10-CM

## 2023-10-09 DIAGNOSIS — N40.1 BPH WITH URINARY OBSTRUCTION: ICD-10-CM

## 2023-10-09 DIAGNOSIS — R31.29 HEMATURIA, MICROSCOPIC: ICD-10-CM

## 2023-10-09 DIAGNOSIS — N13.8 BPH WITH URINARY OBSTRUCTION: ICD-10-CM

## 2023-10-09 DIAGNOSIS — N18.30 TYPE 2 DIABETES MELLITUS WITH STAGE 3 CHRONIC KIDNEY DISEASE, WITH LONG-TERM CURRENT USE OF INSULIN, UNSPECIFIED WHETHER STAGE 3A OR 3B CKD: ICD-10-CM

## 2023-10-09 DIAGNOSIS — N17.9 ACUTE KIDNEY INJURY SUPERIMPOSED ON CKD: Primary | ICD-10-CM

## 2023-10-09 DIAGNOSIS — Z79.4 TYPE 2 DIABETES MELLITUS WITH STAGE 3 CHRONIC KIDNEY DISEASE, WITH LONG-TERM CURRENT USE OF INSULIN, UNSPECIFIED WHETHER STAGE 3A OR 3B CKD: Primary | ICD-10-CM

## 2023-10-09 DIAGNOSIS — N45.1 RIGHT EPIDIDYMITIS: ICD-10-CM

## 2023-10-09 DIAGNOSIS — N18.30 TYPE 2 DIABETES MELLITUS WITH STAGE 3 CHRONIC KIDNEY DISEASE, WITH LONG-TERM CURRENT USE OF INSULIN, UNSPECIFIED WHETHER STAGE 3A OR 3B CKD: Primary | ICD-10-CM

## 2023-10-09 DIAGNOSIS — I87.2 VENOUS INSUFFICIENCY OF BOTH LOWER EXTREMITIES: Primary | ICD-10-CM

## 2023-10-09 DIAGNOSIS — E11.22 TYPE 2 DIABETES MELLITUS WITH STAGE 3 CHRONIC KIDNEY DISEASE, WITH LONG-TERM CURRENT USE OF INSULIN, UNSPECIFIED WHETHER STAGE 3A OR 3B CKD: Primary | ICD-10-CM

## 2023-10-09 DIAGNOSIS — N13.30 HYDRONEPHROSIS OF RIGHT KIDNEY: ICD-10-CM

## 2023-10-09 DIAGNOSIS — N20.0 BILATERAL KIDNEY STONES: ICD-10-CM

## 2023-10-09 DIAGNOSIS — Z79.4 TYPE 2 DIABETES MELLITUS WITH STAGE 3 CHRONIC KIDNEY DISEASE, WITH LONG-TERM CURRENT USE OF INSULIN, UNSPECIFIED WHETHER STAGE 3A OR 3B CKD: ICD-10-CM

## 2023-10-09 DIAGNOSIS — N18.9 ACUTE KIDNEY INJURY SUPERIMPOSED ON CKD: Primary | ICD-10-CM

## 2023-10-09 DIAGNOSIS — N50.82 SCROTAL PAIN: ICD-10-CM

## 2023-10-09 DIAGNOSIS — E11.22 TYPE 2 DIABETES MELLITUS WITH STAGE 3 CHRONIC KIDNEY DISEASE, WITH LONG-TERM CURRENT USE OF INSULIN, UNSPECIFIED WHETHER STAGE 3A OR 3B CKD: ICD-10-CM

## 2023-10-09 PROCEDURE — 74018 RADEX ABDOMEN 1 VIEW: CPT | Mod: TC

## 2023-10-09 PROCEDURE — 1101F PT FALLS ASSESS-DOCD LE1/YR: CPT | Mod: CPTII,S$GLB,, | Performed by: UROLOGY

## 2023-10-09 PROCEDURE — 3080F DIAST BP >= 90 MM HG: CPT | Mod: CPTII,S$GLB,, | Performed by: UROLOGY

## 2023-10-09 PROCEDURE — 1101F PR PT FALLS ASSESS DOC 0-1 FALLS W/OUT INJ PAST YR: ICD-10-PCS | Mod: CPTII,S$GLB,, | Performed by: UROLOGY

## 2023-10-09 PROCEDURE — 3062F PR POS MACROALBUMINURIA RESULT DOCUMENTED/REVIEW: ICD-10-PCS | Mod: CPTII,S$GLB,, | Performed by: UROLOGY

## 2023-10-09 PROCEDURE — 3077F PR MOST RECENT SYSTOLIC BLOOD PRESSURE >= 140 MM HG: ICD-10-PCS | Mod: CPTII,S$GLB,, | Performed by: UROLOGY

## 2023-10-09 PROCEDURE — 3051F PR MOST RECENT HEMOGLOBIN A1C LEVEL 7.0 - < 8.0%: ICD-10-PCS | Mod: CPTII,S$GLB,, | Performed by: UROLOGY

## 2023-10-09 PROCEDURE — 3066F NEPHROPATHY DOC TX: CPT | Mod: CPTII,S$GLB,, | Performed by: UROLOGY

## 2023-10-09 PROCEDURE — 3288F FALL RISK ASSESSMENT DOCD: CPT | Mod: CPTII,S$GLB,, | Performed by: UROLOGY

## 2023-10-09 PROCEDURE — 3288F PR FALLS RISK ASSESSMENT DOCUMENTED: ICD-10-PCS | Mod: CPTII,S$GLB,, | Performed by: UROLOGY

## 2023-10-09 PROCEDURE — 99214 OFFICE O/P EST MOD 30 MIN: CPT | Mod: S$GLB,,, | Performed by: UROLOGY

## 2023-10-09 PROCEDURE — 29581 APPL MULTLAYER CMPRN SYS LEG: CPT

## 2023-10-09 PROCEDURE — 1125F AMNT PAIN NOTED PAIN PRSNT: CPT | Mod: CPTII,S$GLB,, | Performed by: UROLOGY

## 2023-10-09 PROCEDURE — 97016 VASOPNEUMATIC DEVICE THERAPY: CPT

## 2023-10-09 PROCEDURE — 3062F POS MACROALBUMINURIA REV: CPT | Mod: CPTII,S$GLB,, | Performed by: UROLOGY

## 2023-10-09 PROCEDURE — 97140 MANUAL THERAPY 1/> REGIONS: CPT

## 2023-10-09 PROCEDURE — 99214 PR OFFICE/OUTPT VISIT, EST, LEVL IV, 30-39 MIN: ICD-10-PCS | Mod: S$GLB,,, | Performed by: UROLOGY

## 2023-10-09 PROCEDURE — 99999 PR PBB SHADOW E&M-EST. PATIENT-LVL III: ICD-10-PCS | Mod: PBBFAC,,, | Performed by: UROLOGY

## 2023-10-09 PROCEDURE — 74018 RADEX ABDOMEN 1 VIEW: CPT | Mod: 26,,, | Performed by: RADIOLOGY

## 2023-10-09 PROCEDURE — 3008F BODY MASS INDEX DOCD: CPT | Mod: CPTII,S$GLB,, | Performed by: UROLOGY

## 2023-10-09 PROCEDURE — 3008F PR BODY MASS INDEX (BMI) DOCUMENTED: ICD-10-PCS | Mod: CPTII,S$GLB,, | Performed by: UROLOGY

## 2023-10-09 PROCEDURE — 3051F HG A1C>EQUAL 7.0%<8.0%: CPT | Mod: CPTII,S$GLB,, | Performed by: UROLOGY

## 2023-10-09 PROCEDURE — 3080F PR MOST RECENT DIASTOLIC BLOOD PRESSURE >= 90 MM HG: ICD-10-PCS | Mod: CPTII,S$GLB,, | Performed by: UROLOGY

## 2023-10-09 PROCEDURE — 3077F SYST BP >= 140 MM HG: CPT | Mod: CPTII,S$GLB,, | Performed by: UROLOGY

## 2023-10-09 PROCEDURE — 99999 PR PBB SHADOW E&M-EST. PATIENT-LVL III: CPT | Mod: PBBFAC,,, | Performed by: UROLOGY

## 2023-10-09 PROCEDURE — 74018 XR ABDOMEN AP 1 VIEW: ICD-10-PCS | Mod: 26,,, | Performed by: RADIOLOGY

## 2023-10-09 PROCEDURE — 3066F PR DOCUMENTATION OF TREATMENT FOR NEPHROPATHY: ICD-10-PCS | Mod: CPTII,S$GLB,, | Performed by: UROLOGY

## 2023-10-09 PROCEDURE — 1159F MED LIST DOCD IN RCRD: CPT | Mod: CPTII,S$GLB,, | Performed by: UROLOGY

## 2023-10-09 PROCEDURE — 1125F PR PAIN SEVERITY QUANTIFIED, PAIN PRESENT: ICD-10-PCS | Mod: CPTII,S$GLB,, | Performed by: UROLOGY

## 2023-10-09 PROCEDURE — 1159F PR MEDICATION LIST DOCUMENTED IN MEDICAL RECORD: ICD-10-PCS | Mod: CPTII,S$GLB,, | Performed by: UROLOGY

## 2023-10-09 RX ORDER — DOXYCYCLINE 100 MG/1
100 CAPSULE ORAL 2 TIMES DAILY
Qty: 14 CAPSULE | Refills: 0 | Status: ON HOLD | OUTPATIENT
Start: 2023-10-09 | End: 2023-10-17 | Stop reason: HOSPADM

## 2023-10-09 RX ORDER — TAMSULOSIN HYDROCHLORIDE 0.4 MG/1
0.4 CAPSULE ORAL NIGHTLY
Qty: 90 CAPSULE | Refills: 3 | Status: SHIPPED | OUTPATIENT
Start: 2023-10-09 | End: 2024-03-08

## 2023-10-09 NOTE — TELEPHONE ENCOUNTER
----- Message from María Holbrook sent at 10/9/2023  4:31 PM CDT -----  Contact: [phone]  [email or web address]     Patient is calling for Medical Advice regarding: pt needs a referral for nephrology     How long has patient had these symptoms:    Pharmacy name and phone#:    Would like response via Interview [facility]:  no     Comments:

## 2023-10-09 NOTE — TELEPHONE ENCOUNTER
Informed him of his elevated creatine up to 4.9 from 2.4 baseline.  He can contact his nephrologist ASAP or go to the ER. For evaluation and management.

## 2023-10-09 NOTE — PROGRESS NOTES
Physical Therapy Daily Treatment Note     Name: Jian JASMINE [facility] Number: [id number]    Therapy Diagnosis:   Encounter Diagnoses   Name Primary?    Venous insufficiency of both lower extremities Yes    Type 2 diabetes mellitus with stage 3 chronic kidney disease, with long-term current use of insulin, unspecified whether stage 3a or 3b CKD        Physician: Savanah Felton DPM    Visit Date: 10/9/2023    Physician: Savanah Felton DPM     Physician Orders: PT Eval and Treat - lymphedema  Medical Diagnosis from Referral: I89.0 (ICD-10-CM) - Lymphedema of both lower extremities    L03.119 (ICD-10-CM) - Cellulitis of lower extremity, unspecified laterality     Evaluation Date: 7/31/2023  Authorization Period Expiration: 12/31/2023  Plan of Care Expiration: 10/10/2023  Visit # / Visits authorized: 12/ 30     Time In: 10:00am  Time Out: 11:00am  Total Billable Time: 60 minutes     Precautions: Standard and Diabetes    Subjective     Pt reports: He has been having kidney stones   He was compliant with home exercise program.  Response to previous treatment: notes good response to bandaging.   Functional change: wearing temporary compression / velcro wraps     Pain: 0/10  Location: bilateral lower legs     Objective     Small scratch on LLE               Treatment:   Jian received the following manual therapy techniques:- Manual Lymphatic Drainage were applied to the: LLE and RLE for 60 minutes, including: MLD and short stretch compression bandaging     MANUAL LYMPHATIC DRAINAGE (MLD):    While supine with LEs elevated stimulation at terminus, along GI region, B inguinal regions, drainage of entire RLE betina lower leg, ankle, and foot with return proximally,  Use of Aquaphor due to dryness.   Educated in self massage to abdominal areas, B inguinal areas, thigh, and remaining LE within reach.    SEQUENTIAL COMPRESSION PUMP: full leg sleeve applied to LLE  Lymphapress with default setting with distal pressures  starting at 45mmHg entire LE simultaneous to MLD to other LE    MULTILAYERED BANDAGING:  issued supplies and bandaged L LE and RLE with cotton stockinette, 2 komprex wedges post malleoli, 2 cellona rolls ankle to knee, 1-8cm and 2- 10cm Rosidal K rolls foot to knee, to leave intact 12-24 hrs as tolerated, discontinue with any problems, return rolled bandages next session. Wash and wear schedules confirmed.   Edema wear size medium -  education on donning/doffing , proper fit and wear schedule . Can apply after removing bandages and wear daily  Komprex applied to both Les     Home Exercises Provided and Patient Education Provided     Self care home management for 10'  - Importance of daily use of compression - pt admits difficulty reaching feet and will need assistance   - Assisted with measuring R and LLE for pt to self purchase of inelastic velcro wraps   - Assisted pt with ordering velcro wraps today on Lazarus Effect   - Pt purchased velcro wraps for BLEs , XL regular length R and LLE      Education provided:    Remove if painful   PATIENT/FAMILY Education: bandaging wear schedule,  HEP,  Beginning of self massage,  Self or assisted bandaging, compression options, and Risk reduction    Written Home Exercises Provided: Patient instructed to cont prior HEP.  Exercises were reviewed and Jian was able to demonstrate them prior to the end of the session.  Jian demonstrated good  understanding of the education provided.     Assessment     Pt tolerated treatment well with no reports of pain. Kompres used on both pt's Les to address persistent swelling. Will continue to progress     Pt presents with moderate edema secondary to hx of cellulitis  Pt would benefit from therapy and management of other health concerns to reduce edema, aid in finding compression and preparing for home management     Jian Is progressing well towards his goals.   Pt prognosis is Good.     Pt will continue to benefit from skilled outpatient  physical therapy to address the deficits listed in the problem list box on initial evaluation, provide pt/family education and to maximize pt's level of independence in the home and community environment.   Pt's spiritual, cultural and educational needs considered and pt agreeable to plan of care and goals.     Anticipated barriers to physical therapy: skin integrity- will check regularly for skin integrity     Goals:     Short Term Goals: (6 weeks)  1. Patient will show decreased girth in B LE by up to 1 cm to allow for LE symmetry, shoe and clothing choice, and ability to apply needed compression.  (progressing, not met)   2. Patient will demonstrate 100% knowledge of lymphedema precautions and signs of infection to allow for reduced lymphedema risk, infection risk, and/or exacerbation of condition.  (progressing, not met)  3. Patient or caregiver will perform self-bandaging techniques and/or wearing of compression garments to allow for lymphatic drainage support, skin elasticity, and reduction in shape and size of limb. (progressing, not met)  4. Patient will perform self lymph drainage techniques to areas within reach to enhance lymphatic drainage and skin condition.  (progressing, not met)  5. Patient will tolerate daily activities with multilayered bandaging to allow for lymphatic and venous support.  (progressing, not met)     Long Term Goals: (12  weeks)  1. Patient will show decreased girth in B LE by up to 2 cm  to allow for LE symmetry, shoe and clothing choice, and ability to apply needed compression daily.  (progressing, not met)  2. Patient will show reduction in density to mild or less with improved contour of limb to allow for cosmesis, LE symmetry, infection risk reduction, and clothing and compression choice.   (progressing, not met)  3. Patient to krystal/doff compression garment with daily compliance to assist in lymphedema management, skin elasticity, and tissue density.  (progressing, not met)  4.  Pt to show improved postural awareness and alignment.  (progressing, not met)  5. Pt to be I and compliant with HEP to allow for increased function in affected limb.   (progressing, not met)    Plan   Continue PT  2x   weekly for Complete Decongestive Therapy:  Manual lymphatic drainage, Multilayered short stretch bandaging, Pneumatic compression, Therapeutic exercises, Patient education as deemed necessary to achieve stated goals.      Luisa Thompson, PT

## 2023-10-09 NOTE — PROGRESS NOTES
CHIEF COMPLAINT:    Mr. Arrieta is a 68 y.o. male presenting for a follow up.  PRESENTING ILLNESS:    Jian Arrieta is a 68 y.o. male who presents for evaluation of right flank pain.    He went [facility] emergency room on 10/3/23 with sudden onset of right flank pain.    Jian Arrieta is a 68 y.o. male presenting with flank pain. History comes from the patient. Reports right flank pain radiating to his right testicle starting at 15:00 on 10/3/23. Symptoms are moderate to severe. No dysuria. No gross hematuria. Reports nausea but no vomiting. Reports previous history of kidney stones with similar symptoms. Also reports previous history of pancreatis and portal vein occulsion. No other specific complaint.      He has kidney stones in the past and had a stone surgery done by me on 06/30/2021:  Pre-Op Diagnosis: Right proximal ureteral stone  Post-Op Diagnosis: Right renal stone   Procedure(s) Performed:   1. Right ureteroscopy with laser lithotripsy  2. Cystoscopy  3. Right JJ ureteral stent exchange  4. Fluoro < 1 hr  Findings:  1. Large stoneencountered in midpole calyx dusted.  2. No additional stones visualized in right collecting system.  Drains:   1. Right 6 Fr x 24 cm JJ ureteral stent with strings  Stone Analysis  80% Uric acid   20% Calcium oxalate monohydrate     He had bilateral hydrocelectomy on 6/16/21.  He saw Dr. Diaz on 6/21/21 and was noted to have a post op hematoma.  He has not started urocit k due to decreased kidney function.  Today he is interested in trying ED meds.  Tried viagra in the past and it worked well for him.    PATIENT HISTORY:    Past Medical History:   Diagnosis Date    Alcohol abuse     Anasarca 1/28/2019    Anemia     Anticoagulant long-term use     Arthropathy associated with neurological disorder 9/2/2015    Atherosclerosis     Charcot foot due to diabetes mellitus     Chronic combined systolic and diastolic heart failure 01/29/2019 1-28-19 Left VentricleModerate decreased  ejection fraction at 30%. Normal left ventricular cavity size. Normal wall thickness observed. Grade I (mild) left ventricular diastolic dysfunction consistent with impaired relaxation. Normal left atrial pressure. Moderate, global hypokinesis(see wall scoring diagram). Right VentricleNormal cavity size, wall thickness and ejection fraction. Wall motion n    Chronic pancreatitis 1/28/2019    CKD (chronic kidney disease) stage 3, GFR 30-59 ml/min     CKD (chronic kidney disease) stage 4, GFR 15-29 ml/min     Colon polyps     approx 5 yrs ago    Coronary artery disease due to calcified coronary lesion 05/08/2015    5 stents on ASA      Diabetic polyneuropathy associated with type 2 diabetes mellitus 9/2/2015    Diverticulosis 1/28/2019    DM type 2 with diabetic peripheral neuropathy 2/4/2019    Encounter for blood transfusion     Essential hypertension 1/28/2019    Former smoker 8/26/2015    Healed ulcer of left foot on examination 6/20/2017    Hematuria     Hydrocele     approx 1.5 yrs ago    Hyperphosphatemia     Hypoalbuminemia 2/4/2019    Hypocalcemia     Lymphedema of both lower extremities 1/29/2019    Mixed hyperlipidemia 5/8/2015    Morbid obesity with BMI of 50.0-59.9, adult 5/8/2015    Obstruction of right ureteropelvic junction (UPJ) due to stone 5/24/2021    Onychomycosis of multiple toenails with type 2 diabetes mellitus and peripheral neuropathy 6/20/2017    Perianal cyst     approx 2 yrs ago    Proteinuria     Pseudocyst of pancreas 1/28/2019 1-28-19 Liver has a cirrhotic morphology with no focal lesions.  Significant interval increase in ascites when compared to prior exam which may account for patient's abdominal distension.  Hypodense air-fluid collection along the body of the pancreas which is slightly smaller when compared to prior CT.  Findings may relate to pancreatic necrosis with pancreatic pseudocysts with infected pseudocyst    Skin cancer     skin cancer    Sleep apnea 8/26/2015     Status post bariatric surgery 1/11/2016    Type 2 diabetes mellitus, with long-term current use of insulin 5/8/2015    Urinary tract infection        Past Surgical History:   Procedure Laterality Date    ANGIOGRAPHY N/A 6/28/2019    Procedure: ANGIOGRAM-PV STENT;  Surgeon: Ewa Diagnostic Provider;  Location: [facility] OR;  Service: Radiology;  Laterality: N/A;    ANGIOPLASTY      total x5 stents    COLONOSCOPY N/A 10/6/2015    Procedure: COLONOSCOPY;  Surgeon: Shekhar Richards MD;  Location: [facility] (2ND FLR);  Service: Endoscopy;  Laterality: N/A;  BMI over 55/2nd floor case    5 day hold Plavix, Dr Kwadwo Arroyo    COLONOSCOPY N/A 5/13/2021    Procedure: COLONOSCOPY;  Surgeon: Huan Brumfield MD;  Location: [facility];  Service: Endoscopy;  Laterality: N/A;    CORONARY ANGIOGRAPHY Right 3/20/2019    Procedure: ANGIOGRAM, CORONARY ARTERY;  Surgeon: Bob Duque MD;  Location: [facility] CATH LAB;  Service: Cardiology;  Laterality: Right;    CORONARY ARTERY BYPASS GRAFT  2017    x3    CORONARY BYPASS GRAFT ANGIOGRAPHY  3/20/2019    Procedure: Bypass graft study;  Surgeon: Bob Duque MD;  Location: [facility] CATH LAB;  Service: Cardiology;;    CYST REMOVAL      CYSTOSCOPY Right 6/30/2021    Procedure: CYSTOSCOPY;  Surgeon: William Diaz MD;  Location: [facility] 1ST FLR;  Service: Urology;  Laterality: Right;    CYSTOSCOPY W/ URETERAL STENT PLACEMENT Right 6/16/2021    Procedure: CYSTOSCOPY, WITH URETERAL STENT INSERTION;  Surgeon: William Diaz MD;  Location: [facility] 2ND FLR;  Service: Urology;  Laterality: Right;  FLUORO LESS THAN 1 HOUR    ENDOSCOPIC ULTRASOUND OF UPPER GASTROINTESTINAL TRACT N/A 2/26/2020    Procedure: ULTRASOUND, UPPER GI TRACT, ENDOSCOPIC;  Surgeon: Robbie Yang MD;  Location: [facility] ENDO;  Service: Endoscopy;  Laterality: N/A;    ESOPHAGOGASTRODUODENOSCOPY N/A 7/8/2019    Procedure: ESOPHAGOGASTRODUODENOSCOPY (EGD);  Surgeon: Huan Brumfield MD;  Location: [facility];  Service: Endoscopy;   Laterality: N/A;    ESOPHAGOGASTRODUODENOSCOPY N/A 5/13/2021    Procedure: EGD (ESOPHAGOGASTRODUODENOSCOPY);  Surgeon: Huan Brumfield MD;  Location: [facility] ENDO;  Service: Endoscopy;  Laterality: N/A;    EXCISION OF HYDROCELE Bilateral 6/16/2021    Procedure: HYDROCELE REPAIR;  Surgeon: William Diaz MD;  Location: [facility] OR 2ND FLR;  Service: Urology;  Laterality: Bilateral;  2 HOURS    FLUOROSCOPY N/A 6/16/2021    Procedure: FLUOROSCOPY;  Surgeon: William Diaz MD;  Location: [facility] OR 2ND FLR;  Service: Urology;  Laterality: N/A;    GASTRECTOMY      INSERTION OF DIALYSIS CATHETER N/A 5/17/2019    Procedure: pleurx;  Surgeon: [facility] Diagnostic Provider;  Location: [facility] OR 2ND FLR;  Service: General;  Laterality: N/A;  Room 188/[facility]    KNEE ARTHROSCOPY      LAPAROSCOPIC CHOLECYSTECTOMY N/A 5/4/2020    Procedure: CHOLECYSTECTOMY, LAPAROSCOPIC;  Surgeon: SON Rowe MD;  Location: [facility] OR;  Service: General;  Laterality: N/A;    LASER LITHOTRIPSY N/A 6/30/2021    Procedure: LITHOTRIPSY, USING LASER;  Surgeon: William Diaz MD;  Location: [facility] OR 1ST FLR;  Service: Urology;  Laterality: N/A;    LIVER BIOPSY N/A 5/4/2020    Procedure: BIOPSY, LIVER, Laproscopic ;  Surgeon: SON Rowe MD;  Location: [facility] OR;  Service: General;  Laterality: N/A;    perianal surgery      perianal cyst removed    PYELOSCOPY Right 6/30/2021    Procedure: PYELOSCOPY;  Surgeon: William Diaz MD;  Location: [facility] OR 1ST FLR;  Service: Urology;  Laterality: Right;    REPLACEMENT OF STENT Right 6/30/2021    Procedure: REPLACEMENT, STENT;  Surgeon: William Diaz MD;  Location: [facility] OR 1ST FLR;  Service: Urology;  Laterality: Right;    URETEROSCOPY Right 6/30/2021    Procedure: URETEROSCOPY FLEXIBLE URETEROSCOPE;  Surgeon: William Diaz MD;  Location: [facility] OR 1ST FLR;  Service: Urology;  Laterality: Right;       Family History   Problem Relation Age of Onset    Cancer Mother     Cancer Father     Heart disease Father      Obesity Sister     Parkinsonism Brother     No Known Problems Paternal Grandmother     Cancer Paternal Grandfather     Cancer Brother     Diabetes Maternal Grandmother     Stroke Maternal Grandfather     Cirrhosis Neg Hx        Social History     Socioeconomic History    Marital status:    Tobacco Use    Smoking status: Former     Current packs/day: 0.00     Average packs/day: 2.0 packs/day for 30.0 years (60.0 ttl pk-yrs)     Types: Cigarettes     Start date: 1975     Quit date: 2005     Years since quittin.6    Smokeless tobacco: Never   Substance and Sexual Activity    Alcohol use: No     Comment: started ~, reports 1 shot daily, max 3 shots daily, vague about alcohol consumption. Last drink 2018    Drug use: No       Allergies:  Patient has no known allergies.    Medications:    Current Outpatient Medications:     aspirin (ECOTRIN) 81 MG EC tablet, Take 1 tablet (81 mg total) by mouth once daily., Disp: 9 tablet, Rfl: 0    blood-glucose sensor (DEXCOM G6 SENSOR) Sandi, Inject 1 each into the skin every 10 days., Disp: 3 each, Rfl: 11    blood-glucose transmitter (DEXCOM G6 TRANSMITTER) Sandi, Inject 1 each into the skin every 10 days., Disp: 1 each, Rfl: 3    bumetanide (BUMEX) 1 MG tablet, TAKE 1 TABLET (1 MG TOTAL) BY MOUTH BEFORE BREAKFAST AND 1/2 TABLET (0.5 MG TOTAL) EVERY EVENING., Disp: 135 tablet, Rfl: 3    glucagon (BAQSIMI) 3 mg/actuation Spry, 1 each by Nasal route daily as needed (if glucose is less than 70 - can repeat in 1 hour if still low/less than 70)., Disp: 2 each, Rfl: 3    HUMALOG U-100 INSULIN 100 unit/mL injection, , Disp: , Rfl:     insulin glargine, TOUJEO, (TOUJEO SOLOSTAR U-300 INSULIN) 300 unit/mL (1.5 mL) InPn pen, Inject 30 Units into the skin once daily. Can increase dose by 2 units higher to get to goal fasting glucose 100 - 150 - for max TDD 60 units. Takes daily IN AN EMERGENCY only if OFF OF insulin pump., Disp: 15 mL, Rfl: 1    insulin lispro-[facility]  "(LYUMJEV U-100 INSULIN) 100 unit/mL, Inject 80 Units into the skin continuous. USES IN OMNIPOD INSULIN PUMP. USE AS DIRECTED. DO NOT DIRECTLY INJECT., Disp: 30 mL, Rfl: 6    insulin pump cart,automated,BT (OMNIPOD 5 G6 PODS, GEN 5,) Crtg, Inject 1 each into the skin every other day. Use with omnipod 5 pdm as directed., Disp: 15 each, Rfl: 11    pen needle, diabetic 32 gauge x 5/32" Ndle, Use with toujeo insulin one daily only as Emergency use/back up insulin - if OFF OF your insulin pump., Disp: 30 each, Rfl: 3    doxycycline (VIBRAMYCIN) 100 MG Cap, Take 1 capsule (100 mg total) by mouth 2 (two) times daily. for 14 doses, Disp: 14 capsule, Rfl: 0    tamsulosin (FLOMAX) 0.4 mg Cap, Take 1 capsule (0.4 mg total) by mouth every evening., Disp: 90 capsule, Rfl: 3    PHYSICAL EXAMINATION:    Constitutional: He appears well-developed and well-nourished.  He is in no apparent distress.    Eyes: No scleral icterus noted bilaterally. No discharge bilaterally.    Nose: No rhinorrhea    Cardiovascular: Normal rate.      Pulmonary/Chest: Effort normal. No respiratory distress.     Abdominal:  He exhibits no distension.      Neurological: He is alert and oriented to person, place, and time.     Psych: Cooperative with normal affect.    No CVA tenderness noted.  Normal testicular exam on both sides.  Slightly tender on the right epid? On palpation.  Np swelling or erythema noted in the scrotum.      Lab Results   Component Value Date    PSA 1.2 04/06/2023    PSA 1.2 03/16/2022        CT RSS 10/3/23 at   There is no evidence for calyceal stone in the right kidney. There is linear vascular calcification the anterior upper pole of the right kidney. There are new hyperdense foci in the mid left kidney, which may represent small hyperdense cysts. There is very mild fullness of the proximal right renal collecting system, but without evidence for a ureteral calculus. The remainder of the right ureter is decompressed. The urinary " bladder is decompressed. There is asymmetric soft tissue stranding of the right perinephric fat. The possibility of right pyelonephritis should be considered in the differential.      CT RSS 6/27/22  Nonobstructing 10 mm left renal stone.  No obstructive uropathy.   Fecalization of distal small bowel loops, suggesting slow transit.  Enlarged prostate.   Mildly nodular hepatic contour.  Correlate for chronic liver disease.      Urine pH 6, +++ protein, 100 Glucose, 50 + blood today    KUB today 10/9/23  Surgical clips right upper quadrant and right paracentral pelvis, 7 mm diagonal vascular calcification overlies left renal nicole, stable.  Nonobstructive tiny stone right mid renal pole and 9.5 mm size stone left mid renal parenchyma cannot be discriminated.  Kidneys obscured by overlying bowel structures, and no stones identified region of ureters and urinary bladder.       IMPRESSION:  Type 2 diabetes mellitus with stage 3 chronic kidney disease, with long-term current use of insulin, unspecified whether stage 3a or 3b CKD  -     Cancel: Basic Metabolic Panel; Future; Expected date: 10/09/2023  -     HEMOGLOBIN A1C; Future; Expected date: 10/09/2023  -     Comprehensive Metabolic Panel; Future; Expected date: 10/09/2023    Right flank pain  -     Cancel: Basic Metabolic Panel; Future; Expected date: 10/09/2023  -     NM Renogram With Lasix; Future; Expected date: 10/09/2023  -     CBC Auto Differential; Future; Expected date: 10/09/2023    Hematuria, microscopic    Hydronephrosis of right kidney  -     Comprehensive Metabolic Panel; Future; Expected date: 10/09/2023    BPH with urinary obstruction  -     tamsulosin (FLOMAX) 0.4 mg Cap; Take 1 capsule (0.4 mg total) by mouth every evening.  Dispense: 90 capsule; Refill: 3    Right epididymitis  -     doxycycline (VIBRAMYCIN) 100 MG Cap; Take 1 capsule (100 mg total) by mouth 2 (two) times daily. for 14 doses  Dispense: 14 capsule; Refill: 0  -     CBC Auto  Differential; Future; Expected date: 10/09/2023    Scrotal pain  -     doxycycline (VIBRAMYCIN) 100 MG Cap; Take 1 capsule (100 mg total) by mouth 2 (two) times daily. for 14 doses  Dispense: 14 capsule; Refill: 0      Will check the blood tests above.  No definite stone seen on outside CT RSS.  Questionable right hydronephrosis.  No evidence of pyelonephritis noted on exam.  Will do MAG 3 renal scan with lasix to rule out any obstruction on the right side.  Will plan cysto with possible right RPG, ureteroscopy if indicated.    Start flomax regularly to treat his LUTS.  Cover him with doxycycline for 1 wk for possible right epididymitis.    He needs to see his PCP to get a better control of HTN.      PLAN:  Follow up MAG 3 renal scan with lasix, discuss cysto poss.right RGP.

## 2023-10-10 ENCOUNTER — NURSE TRIAGE (OUTPATIENT)
Dept: ADMINISTRATIVE | Facility: CLINIC | Age: 68
End: 2023-10-10
Payer: MEDICARE

## 2023-10-10 NOTE — TELEPHONE ENCOUNTER
"Jian c/o right flank pain. Pt reports he woke up this morning with low blood sugar this morning of 77 and was able to get the blood sugar up. Pt states he would like to speak Maxine Braxton NP asap because she knows him and he trust her. Jian states "Dr. Diaz called me yesterday afternoon and told me to get in touch with Nephrology and I might need to go to ER because numbers in lab test are not good." Pt states he did not go to ED yesterday as he was hoping to get in touch with Maxine Braxton NP." Pt asking for confirmation that NP or someone in the office has received this message. Pt states he has questions regarding results from two different radiology readings in which he states one report states he has kidney stones and one report states he does not." Triage offered and pt refused. Pt states he does not want triage because it will just send him to the ER and he would like to speak to his NP first. Informed pt that a high priority message will be sent to NP's office with request for callback to pt within next 1 hour. V/u.  Reason for Disposition   Nursing judgment    Protocols used: Information Only Call - No Triage-A-[place]    "

## 2023-10-11 ENCOUNTER — PATIENT OUTREACH (OUTPATIENT)
Dept: ADMINISTRATIVE | Facility: HOSPITAL | Age: 68
End: 2023-10-11
Payer: MEDICARE

## 2023-10-11 ENCOUNTER — TELEPHONE (OUTPATIENT)
Dept: INTERNAL MEDICINE | Facility: CLINIC | Age: 68
End: 2023-10-11
Payer: MEDICARE

## 2023-10-11 NOTE — PLAN OF CARE
Physical Therapy Daily Treatment Note/ POC Update      Name: Jian JASMINE [facility] Number: [id number]    Therapy Diagnosis:   Encounter Diagnoses   Name Primary?    Venous insufficiency of both lower extremities Yes    Type 2 diabetes mellitus with stage 3 chronic kidney disease, with long-term current use of insulin, unspecified whether stage 3a or 3b CKD            Physician: Savanah Felton DPM    Visit Date: 10/4/2023    Physician: Savanah Felton DPM     Physician Orders: PT Eval and Treat - lymphedema  Medical Diagnosis from Referral: I89.0 (ICD-10-CM) - Lymphedema of both lower extremities    L03.119 (ICD-10-CM) - Cellulitis of lower extremity, unspecified laterality     Evaluation Date: 7/31/2023  Authorization Period Expiration: 12/31/2023  Plan of Care Expiration: 11/10/2023  Visit # / Visits authorized: 11/ 30     Time In: 9:00am  Time Out: 9:53am  Total Billable Time: 53 minutes     Precautions: Standard and Diabetes    Subjective     Pt reports: he would like to continue therapy to try to get the legs even smaller   He was compliant with home exercise program.  Response to previous treatment: notes good response to bandaging.   Functional change: wearing temporary compression     Pain: 0/10  Location: bilateral lower legs     Objective     Small scratch on LLE                 Treatment:   Jian received the following manual therapy techniques:- Manual Lymphatic Drainage were applied to the: LLE and RLE for 57 minutes, including: MLD and short stretch compression bandaging     MANUAL LYMPHATIC DRAINAGE (MLD):    While supine with LEs elevated stimulation at terminus, along GI region, B inguinal regions, drainage of entire LLE betina lower leg, ankle, and foot with return proximally,  Use of Aquaphor due to dryness.   Educated in self massage to abdominal areas, B inguinal areas, thigh, and remaining LE within reach.    SEQUENTIAL COMPRESSION PUMP: full leg sleeve applied to RLE  Lymphapress with  default setting with distal pressures starting at 45mmHg entire LE simultaneous to MLD to other LE    MULTILAYERED BANDAGING:  issued supplies and bandaged L LE and RLE with cotton stockinette, 2 komprex wedges post malleoli, 2 cellona rolls ankle to knee, 1-8cm and 2- 10cm Rosidal K rolls foot to knee, to leave intact 12-24 hrs as tolerated, discontinue with any problems, return rolled bandages next session. Wash and wear schedules confirmed.   Edema wear size medium -  education on donning/doffing , proper fit and wear schedule . Can apply after removing bandages and wear daily  Xeraform placed over L lower legs     Home Exercises Provided and Patient Education Provided     Self care home management for 10'  - Importance of daily use of compression - pt admits difficulty reaching feet and will need assistance   - Assisted with measuring R and LLE for pt to self purchase of inelastic velcro wraps   - Assisted pt with ordering velcro wraps today on [employer]   - Pt purchased velcro wraps for BLEs , XL regular length R and LLE      Education provided:    Remove if painful   PATIENT/FAMILY Education: bandaging wear schedule,  HEP,  Beginning of self massage,  Self or assisted bandaging, compression options, and Risk reduction    Written Home Exercises Provided: Patient instructed to cont prior HEP.  Exercises were reviewed and Jian was able to demonstrate them prior to the end of the session.  Jian demonstrated good  understanding of the education provided.     Assessment     Measurements taken today show good maintaining of pt's smaller legs achieved with therapy. Pt would like to make final push for further reductions and was in agreement to make effort to wear velcro wraps daily. Requesting extension for 1 month to make reductions in pt's edema and prepare pt for home management     Pt presents with moderate edema secondary to hx of cellulitis  Pt would benefit from therapy and management of other health  concerns to reduce edema, aid in finding compression and preparing for home management     Jian Is progressing well towards his goals.   Pt prognosis is Good.     Pt will continue to benefit from skilled outpatient physical therapy to address the deficits listed in the problem list box on initial evaluation, provide pt/family education and to maximize pt's level of independence in the home and community environment.   Pt's spiritual, cultural and educational needs considered and pt agreeable to plan of care and goals.     Anticipated barriers to physical therapy: skin integrity- will check regularly for skin integrity     Goals:     Short Term Goals: (6 weeks)  1. Patient will show decreased girth in B LE by up to 1 cm to allow for LE symmetry, shoe and clothing choice, and ability to apply needed compression. MET   2. Patient will demonstrate 100% knowledge of lymphedema precautions and signs of infection to allow for reduced lymphedema risk, infection risk, and/or exacerbation of condition.  (progressing, not met)  3. Patient or caregiver will perform self-bandaging techniques and/or wearing of compression garments to allow for lymphatic drainage support, skin elasticity, and reduction in shape and size of limb. (progressing, not met)  4. Patient will perform self lymph drainage techniques to areas within reach to enhance lymphatic drainage and skin condition.  (progressing, not met)  5. Patient will tolerate daily activities with multilayered bandaging to allow for lymphatic and venous support.  (progressing, not met)     Long Term Goals: (12  weeks)  1. Patient will show decreased girth in B LE by up to 2 cm  to allow for LE symmetry, shoe and clothing choice, and ability to apply needed compression daily. MET   2. Patient will show reduction in density to mild or less with improved contour of limb to allow for cosmesis, LE symmetry, infection risk reduction, and clothing and compression choice.   (progressing,  not met)  3. Patient to krystal/doff compression garment with daily compliance to assist in lymphedema management, skin elasticity, and tissue density.  (progressing, not met)  4. Pt to show improved postural awareness and alignment.  (progressing, not met)  5. Pt to be I and compliant with HEP to allow for increased function in affected limb.   (progressing, not met)    Plan   Requesting extension for one month       Luisa Thompson, PT

## 2023-10-11 NOTE — PROGRESS NOTES
Health Maintenance Due   Topic Date Due    Shingles Vaccine (1 of 2) Never done    Pneumococcal Vaccines (Age 65+) (2 - PCV) 10/26/2021    Eye Exam  06/10/2022    Foot Exam  09/21/2022    Influenza Vaccine (1) 09/01/2023    COVID-19 Vaccine (4 - 2023-24 season) 09/01/2023     Chart reviewed.   Immunizations: Reconciled  Orders placed: N/A  Upcoming appts to satisfy AMANDA topics: N/A

## 2023-10-11 NOTE — TELEPHONE ENCOUNTER
----- Message from Sandeep Jordan sent at 10/11/2023  8:26 AM CDT -----  Contact: self [phone]  Pt requesting a call in regards to ER visits.    Please call and advise

## 2023-10-11 NOTE — PROGRESS NOTES
HPI  This 68 y.o. y/o male with PMH of type 2 DM, HTN, HLD, HFrEF&HFpEF, portal vein thrombosis with mild cirrhosis & chronic pancreatitis c/b ascites s/p venoplasty, CAD s/p PCI x5 on Plavix with subsequent CABG, right proximal ureteral stone s/p right ureteroscopy with laser lithotripsy, cystoscopy, right JJ ureteral stent exchange in 2021, now removed came in for ELIEZER on CKD.    Previously seen by Dr. Askew.    Renal history  Creatinine   Date Value Ref Range Status   10/12/2023 4.7 (H) 0.5 - 1.4 mg/dL Final   10/09/2023 4.0 (H) 0.5 - 1.4 mg/dL Final   07/07/2023 2.4 (H) 0.5 - 1.4 mg/dL Final   07/06/2023 2.4 (H) 0.5 - 1.4 mg/dL Final   04/06/2023 2.2 (H) 0.5 - 1.4 mg/dL Final   03/08/2023 2.1 (H) 0.5 - 1.4 mg/dL Final   02/20/2023 2.15 (H) 0.70 - 1.20 mg/dL Final   02/19/2023 2.31 (H) 0.70 - 1.20 mg/dL Final   02/18/2023 2.19 (H) 0.70 - 1.20 mg/dL Final   09/09/2022 2.0 (H) 0.5 - 1.4 mg/dL Final   06/22/2022 1.8 (H) 0.5 - 1.4 mg/dL Final   03/15/2022 2.1 (H) 0.5 - 1.4 mg/dL Final   12/16/2021 2.0 (H) 0.5 - 1.4 mg/dL Final   Cr trended up to 2.4 then 4.7 10/2023  Cr 1.8-2.2 [id number]  Cr up to 3.5 in 2020, then down to 1.8-2.0 in 03/2020, RUQ pain, s/p Cholecystostomy tube placement   Cr up to 4.9 in 2019, then down to 1.4 02/2020; hospitalization for large amount ascites removal s/p venoplasty   Cr 1.1-1.3 prior to 2019  Prot/Creat Ratio, Urine   Date Value Ref Range Status   05/22/2020 0.45 (H) 0.00 - 0.20 Final   02/27/2020 0.73 (H) 0.00 - 0.20 Final   UPCR 0.45 in 2020  Urine protein 2+ started 2019, most recent urine protein 4+ 10/2023  Mentioned decreased UOP and weight gain in the past several weeks  Hx of BPH with obstruction from Urology's note; on tamsulosin, NM Renogram With Lasix was ordered.  Has been drinking 50 oz of fluid a day  Not taking NSAIDs    On Doxycycline for suspected epididymitis     Renal stone  Multiple episodes+ > 20  Right proximal ureteral stone s/p right ureteroscopy with laser  lithotripsy, cystoscopy, right JJ ureteral stent exchange in 2021. Stone analysis: 80% Uric acid, 20% Calcium oxalate monohydrate.    HTN  154/78 mmHg  BP at home: not checking  Current medication: none, not on bumex for a long time  Trying to be compliant with low salt diet  Weight gain 10+ pounds during the past several weeks/months    Type 2 DM  Lab Results   Component Value Date    HGBA1C 7.6 (H) 10/09/2023   No DM nephropathy  Has been poorly controlled (A1c 11 1 year ago)  With insulin therapy    10/2023 renal stone CT scan  There is no evidence for calyceal stone in the right kidney. There is linear vascular calcification the anterior upper pole of the right kidney. There are new hyperdense foci in the mid left kidney, which may represent small hyperdense cysts. There is very mild fullness of the proximal right renal collecting system, but without evidence for a ureteral calculus. The remainder of the right ureter is decompressed. The urinary bladder is decompressed. There is asymmetric soft tissue stranding of the right perinephric fat. The possibility of right pyelonephritis should be considered in the differential.   There is 1.9 cm right adrenal nodule, consistent with adenoma, similar to the previous exam.     Echo 06/2019  The estimated ejection fraction is 45-50%  Left ventricular diastolic dysfunction.    Past Medical History:   Diagnosis Date    Alcohol abuse     Anasarca 1/28/2019    Anemia     Anticoagulant long-term use     Arthropathy associated with neurological disorder 9/2/2015    Atherosclerosis     Charcot foot due to diabetes mellitus     Chronic combined systolic and diastolic heart failure 01/29/2019 1-28-19 Left VentricleModerate decreased ejection fraction at 30%. Normal left ventricular cavity size. Normal wall thickness observed. Grade I (mild) left ventricular diastolic dysfunction consistent with impaired relaxation. Normal left atrial pressure. Moderate, global hypokinesis(see  wall scoring diagram). Right VentricleNormal cavity size, wall thickness and ejection fraction. Wall motion n    Chronic pancreatitis 1/28/2019    CKD (chronic kidney disease) stage 3, GFR 30-59 ml/min     CKD (chronic kidney disease) stage 4, GFR 15-29 ml/min     Colon polyps     approx 5 yrs ago    Coronary artery disease due to calcified coronary lesion 05/08/2015    5 stents on ASA      Diabetic polyneuropathy associated with type 2 diabetes mellitus 9/2/2015    Diverticulosis 1/28/2019    DM type 2 with diabetic peripheral neuropathy 2/4/2019    Encounter for blood transfusion     Essential hypertension 1/28/2019    Former smoker 8/26/2015    Healed ulcer of left foot on examination 6/20/2017    Hematuria     Hydrocele     approx 1.5 yrs ago    Hyperphosphatemia     Hypoalbuminemia 2/4/2019    Hypocalcemia     Lymphedema of both lower extremities 1/29/2019    Mixed hyperlipidemia 5/8/2015    Morbid obesity with BMI of 50.0-59.9, adult 5/8/2015    Obstruction of right ureteropelvic junction (UPJ) due to stone 5/24/2021    Onychomycosis of multiple toenails with type 2 diabetes mellitus and peripheral neuropathy 6/20/2017    Perianal cyst     approx 2 yrs ago    Proteinuria     Pseudocyst of pancreas 1/28/2019 1-28-19 Liver has a cirrhotic morphology with no focal lesions.  Significant interval increase in ascites when compared to prior exam which may account for patient's abdominal distension.  Hypodense air-fluid collection along the body of the pancreas which is slightly smaller when compared to prior CT.  Findings may relate to pancreatic necrosis with pancreatic pseudocysts with infected pseudocyst    Skin cancer     skin cancer    Sleep apnea 8/26/2015    Status post bariatric surgery 1/11/2016    Type 2 diabetes mellitus, with long-term current use of insulin 5/8/2015    Urinary tract infection        Past Surgical History:   Procedure Laterality Date    ANGIOGRAPHY N/A 6/28/2019    Procedure:  ANGIOGRAM-PV STENT;  Surgeon: [facility] Diagnostic Provider;  Location: [facility] OR;  Service: Radiology;  Laterality: N/A;    ANGIOPLASTY      total x5 stents    COLONOSCOPY N/A 10/6/2015    Procedure: COLONOSCOPY;  Surgeon: Shekhar Richards MD;  Location: [facility] (2ND FLR);  Service: Endoscopy;  Laterality: N/A;  BMI over 55/2nd floor case    5 day hold Plavix, Dr Kwadwo Arroyo    COLONOSCOPY N/A 5/13/2021    Procedure: COLONOSCOPY;  Surgeon: Huan Brumfield MD;  Location: [facility];  Service: Endoscopy;  Laterality: N/A;    CORONARY ANGIOGRAPHY Right 3/20/2019    Procedure: ANGIOGRAM, CORONARY ARTERY;  Surgeon: Bob Duque MD;  Location: [facility] CATH LAB;  Service: Cardiology;  Laterality: Right;    CORONARY ARTERY BYPASS GRAFT  2017    x3    CORONARY BYPASS GRAFT ANGIOGRAPHY  3/20/2019    Procedure: Bypass graft study;  Surgeon: Bob Duque MD;  Location: [facility] CATH LAB;  Service: Cardiology;;    CYST REMOVAL      CYSTOSCOPY Right 6/30/2021    Procedure: CYSTOSCOPY;  Surgeon: William Diaz MD;  Location: [facility] 1ST FLR;  Service: Urology;  Laterality: Right;    CYSTOSCOPY W/ URETERAL STENT PLACEMENT Right 6/16/2021    Procedure: CYSTOSCOPY, WITH URETERAL STENT INSERTION;  Surgeon: William Diaz MD;  Location: [facility] 2ND FLR;  Service: Urology;  Laterality: Right;  FLUORO LESS THAN 1 HOUR    ENDOSCOPIC ULTRASOUND OF UPPER GASTROINTESTINAL TRACT N/A 2/26/2020    Procedure: ULTRASOUND, UPPER GI TRACT, ENDOSCOPIC;  Surgeon: Robbie Yang MD;  Location: [facility];  Service: Endoscopy;  Laterality: N/A;    ESOPHAGOGASTRODUODENOSCOPY N/A 7/8/2019    Procedure: ESOPHAGOGASTRODUODENOSCOPY (EGD);  Surgeon: Huan Brumfield MD;  Location: [facility];  Service: Endoscopy;  Laterality: N/A;    ESOPHAGOGASTRODUODENOSCOPY N/A 5/13/2021    Procedure: EGD (ESOPHAGOGASTRODUODENOSCOPY);  Surgeon: Huan Brumfield MD;  Location: [facility];  Service: Endoscopy;  Laterality: N/A;    EXCISION OF HYDROCELE Bilateral 6/16/2021     Procedure: HYDROCELE REPAIR;  Surgeon: William Diaz MD;  Location: [facility] OR 2ND FLR;  Service: Urology;  Laterality: Bilateral;  2 HOURS    FLUOROSCOPY N/A 6/16/2021    Procedure: FLUOROSCOPY;  Surgeon: William Diaz MD;  Location: [facility] OR 2ND FLR;  Service: Urology;  Laterality: N/A;    GASTRECTOMY      INSERTION OF DIALYSIS CATHETER N/A 5/17/2019    Procedure: pleurx;  Surgeon: Ewa Diagnostic Provider;  Location: [facility] OR 2ND FLR;  Service: General;  Laterality: N/A;  Room 188/[facility]    KNEE ARTHROSCOPY      LAPAROSCOPIC CHOLECYSTECTOMY N/A 5/4/2020    Procedure: CHOLECYSTECTOMY, LAPAROSCOPIC;  Surgeon: SON Rowe MD;  Location: [facility] OR;  Service: General;  Laterality: N/A;    LASER LITHOTRIPSY N/A 6/30/2021    Procedure: LITHOTRIPSY, USING LASER;  Surgeon: William Diaz MD;  Location: [facility] OR 1ST FLR;  Service: Urology;  Laterality: N/A;    LIVER BIOPSY N/A 5/4/2020    Procedure: BIOPSY, LIVER, Laproscopic ;  Surgeon: SON Rowe MD;  Location: [facility] OR;  Service: General;  Laterality: N/A;    perianal surgery      perianal cyst removed    PYELOSCOPY Right 6/30/2021    Procedure: PYELOSCOPY;  Surgeon: William Diaz MD;  Location: [facility] OR 1ST FLR;  Service: Urology;  Laterality: Right;    REPLACEMENT OF STENT Right 6/30/2021    Procedure: REPLACEMENT, STENT;  Surgeon: William Diaz MD;  Location: [facility] OR 1ST FLR;  Service: Urology;  Laterality: Right;    URETEROSCOPY Right 6/30/2021    Procedure: URETEROSCOPY FLEXIBLE URETEROSCOPE;  Surgeon: William Diaz MD;  Location: [facility] OR 1ST FLR;  Service: Urology;  Laterality: Right;       Review of patient's allergies indicates:  No Known Allergies      Social History     Socioeconomic History    Marital status:    Tobacco Use    Smoking status: Former     Current packs/day: 0.00     Average packs/day: 2.0 packs/day for 30.0 years (60.0 ttl pk-yrs)     Types: Cigarettes     Start date: 2/1/1975     Quit date: 2/1/2005     Years since  "quittin.7    Smokeless tobacco: Never   Substance and Sexual Activity    Alcohol use: No     Comment: started ~, reports 1 shot daily, max 3 shots daily, vague about alcohol consumption. Last drink 2018    Drug use: No       Family History   Problem Relation Age of Onset    Cancer Mother     Cancer Father     Heart disease Father     Obesity Sister     Parkinsonism Brother     No Known Problems Paternal Grandmother     Cancer Paternal Grandfather     Cancer Brother     Diabetes Maternal Grandmother     Stroke Maternal Grandfather     Cirrhosis Neg Hx        ROS negative except listed above    PHYSICAL EXAMINATION:  Blood pressure (!) 154/78, pulse 89, height 5' 7" (1.702 m), weight 127 kg (280 lb), SpO2 98 %.  Constitutional - No acute distress  HEENT - Grossly normal  Neck - supple.   Cardiovascular - JVP mild distended 8-9 cm  Respiratory - Clear  Musculoskeletal - Peripheral edema 2+ (the patient said the swelling has been a little better)  Dermatologic/Skin - Skin warm and dry.  No rashes.    Neurologic - No acute neurological deficit  Psychiatric - AAOx3    Assessment and Plan  1.  Acute kidney injury:       CKD Stage 4:     Urine Protein Creatinine Ratios:    Prot/Creat Ratio, Urine   Date Value Ref Range Status   2020 0.45 (H) 0.00 - 0.20 Final   2020 0.73 (H) 0.00 - 0.20 Final   2019 Unable to calculate 0.00 - 0.20 Final         Acid-Base:   Lab Results   Component Value Date     (L) 10/12/2023    K 5.2 (H) 10/12/2023    CO2 22 (L) 10/12/2023   -Etiology of ELIEZER: ?obstruction, pending NM renal scan with lasix vs HFrEF exacerbation  -Etiology of CKD: multiple ELIEZER in the past, HTN, DM, CHF  -Office urine lab: dipstick: SG 1.025, pH 5, nitrite neg, blood neg, protein++, glucose; micro: isomorphic RBCs+, scant muddy brown casts, TY casts, WBCs  -Lab came back after the clinic visit that Cr went up to 4.7, K 5.2. Given the acuity of the condition, I will send him to the ER. He " will need an RPUS to r/o any obstruction and will need an CXR/echo to r/o HFrEF exacerbation. Likely will need IV diuretics if the latter is confirmed after ruling out the obstruction  -Sent ELIEZER/CKD work up including urine analysis, urine micro, culture, UACR/UPCR (may be over-estimating iso ELIEZER), RP US STAT to r/o obstruction. Hepatitis B/C, SPEP, SFLC, RPR, CAROLYNN, ANCA, C3, C4 were ordered.   -Counseled to avoid NSAIDs  -Adequate fluid intake 45 oz a day    2. HTN: BP goal 130/80 mmHg  -Counseled for low salt diet  -Counseled the patient to check BP at home  -Will not prescribe anti-HTN regimen given right groin discomfort+, not ruling out obstruction    3. Bone mineral condition:   Lab Results   Component Value Date    CALCIUM 8.3 (L) 10/12/2023    CALCIUM 8.1 (L) 10/09/2023    CAION 1.13 03/15/2019    PHOS 4.0 09/28/2020    PHOS 4.6 (H) 05/22/2020     Vit D, 25-Hydroxy   Date Value Ref Range Status   08/02/2021 23 (L) 30 - 96 ng/mL Final     Comment:     Vitamin D deficiency.........<10 ng/mL                              Vitamin D insufficiency......10-29 ng/mL       Vitamin D sufficiency........> or equal to 30 ng/mL  Vitamin D toxicity............>100 ng/mL      Will monitor after the current ELIEZER episode stabilized    4. Anemia:   Lab Results   Component Value Date    HGB 12.3 (L) 10/12/2023    FERRITIN 270 03/15/2019    IRON 25 (L) 03/15/2019    TRANSFERRIN 37 (L) 03/15/2019    TIBC 55 (L) 03/15/2019    FESATURATED 45 03/15/2019        5. Type 2 DM:  Last HbA1C   Lab Results   Component Value Date    HGBA1C 7.6 (H) 10/09/2023     Counseled the patient regarding the importance of sugar control to slow CKD progression       6. HFrEF & HFpEF  Counseled the patient to watch and keep the weight stable as unstable fluid status can affect the kidney function.    Sent the patient to the ER today. Will schedule a follow up in 3 weeks.

## 2023-10-11 NOTE — TELEPHONE ENCOUNTER
Spoke with the pt, he needs a hospital follow with his pcp to discuss referrals to nephrology or urology, he reached out to Ivanna to move things along faster, discussed with pt he needs to reach out to his primary.

## 2023-10-12 ENCOUNTER — HOSPITAL ENCOUNTER (INPATIENT)
Facility: HOSPITAL | Age: 68
LOS: 5 days | Discharge: HOME | DRG: 660 | End: 2023-10-17
Attending: EMERGENCY MEDICINE | Admitting: HOSPITALIST
Payer: MEDICARE

## 2023-10-12 ENCOUNTER — OFFICE VISIT (OUTPATIENT)
Dept: NEPHROLOGY | Facility: CLINIC | Age: 68
End: 2023-10-12
Payer: MEDICARE

## 2023-10-12 ENCOUNTER — TELEPHONE (OUTPATIENT)
Dept: INTERNAL MEDICINE | Facility: CLINIC | Age: 68
End: 2023-10-12
Payer: MEDICARE

## 2023-10-12 ENCOUNTER — PATIENT MESSAGE (OUTPATIENT)
Dept: RESPIRATORY THERAPY | Facility: HOSPITAL | Age: 68
End: 2023-10-12
Payer: MEDICARE

## 2023-10-12 VITALS
DIASTOLIC BLOOD PRESSURE: 78 MMHG | OXYGEN SATURATION: 98 % | HEIGHT: 67 IN | HEART RATE: 89 BPM | BODY MASS INDEX: 43.95 KG/M2 | SYSTOLIC BLOOD PRESSURE: 154 MMHG | WEIGHT: 280 LBS

## 2023-10-12 DIAGNOSIS — I10 HYPERTENSION: ICD-10-CM

## 2023-10-12 DIAGNOSIS — E66.01 CLASS 3 OBESITY: ICD-10-CM

## 2023-10-12 DIAGNOSIS — N18.4 TYPE 2 DIABETES MELLITUS WITH STAGE 4 CHRONIC KIDNEY DISEASE, UNSPECIFIED WHETHER LONG TERM INSULIN USE: ICD-10-CM

## 2023-10-12 DIAGNOSIS — I50.43 ACUTE ON CHRONIC COMBINED SYSTOLIC AND DIASTOLIC HEART FAILURE: ICD-10-CM

## 2023-10-12 DIAGNOSIS — Z96.41 INSULIN PUMP STATUS: ICD-10-CM

## 2023-10-12 DIAGNOSIS — Z79.4 TYPE 2 DIABETES MELLITUS WITH DIABETIC NEUROPATHY, WITH LONG-TERM CURRENT USE OF INSULIN: Chronic | ICD-10-CM

## 2023-10-12 DIAGNOSIS — N20.0 NEPHROLITHIASIS: ICD-10-CM

## 2023-10-12 DIAGNOSIS — E10.8 DIABETES MELLITUS TYPE 1, WITH COMPLICATION, ON LONG TERM INSULIN PUMP: ICD-10-CM

## 2023-10-12 DIAGNOSIS — Z96.41 DIABETES MELLITUS TYPE 1, WITH COMPLICATION, ON LONG TERM INSULIN PUMP: ICD-10-CM

## 2023-10-12 DIAGNOSIS — E11.69 HYPERLIPIDEMIA ASSOCIATED WITH TYPE 2 DIABETES MELLITUS: Chronic | ICD-10-CM

## 2023-10-12 DIAGNOSIS — E13.9 LADA (LATENT AUTOIMMUNE DIABETES IN ADULTS), MANAGED AS TYPE 1: ICD-10-CM

## 2023-10-12 DIAGNOSIS — E78.5 HYPERLIPIDEMIA ASSOCIATED WITH TYPE 2 DIABETES MELLITUS: Chronic | ICD-10-CM

## 2023-10-12 DIAGNOSIS — N17.9 ACUTE KIDNEY INJURY SUPERIMPOSED ON CKD: Primary | ICD-10-CM

## 2023-10-12 DIAGNOSIS — R80.9 NEPHROTIC RANGE PROTEINURIA: ICD-10-CM

## 2023-10-12 DIAGNOSIS — E11.40 TYPE 2 DIABETES MELLITUS WITH DIABETIC NEUROPATHY, WITH LONG-TERM CURRENT USE OF INSULIN: Chronic | ICD-10-CM

## 2023-10-12 DIAGNOSIS — N13.30 HYDRONEPHROSIS, UNSPECIFIED HYDRONEPHROSIS TYPE: Primary | ICD-10-CM

## 2023-10-12 DIAGNOSIS — I87.2 VENOUS STASIS DERMATITIS OF BOTH LOWER EXTREMITIES: ICD-10-CM

## 2023-10-12 DIAGNOSIS — R10.9 RIGHT FLANK PAIN: ICD-10-CM

## 2023-10-12 DIAGNOSIS — I50.40 COMBINED SYSTOLIC AND DIASTOLIC CONGESTIVE HEART FAILURE, UNSPECIFIED HF CHRONICITY: ICD-10-CM

## 2023-10-12 DIAGNOSIS — N18.9 ACUTE KIDNEY INJURY SUPERIMPOSED ON CKD: Primary | ICD-10-CM

## 2023-10-12 DIAGNOSIS — N17.9 AKI (ACUTE KIDNEY INJURY): ICD-10-CM

## 2023-10-12 DIAGNOSIS — N17.9 ACUTE RENAL FAILURE SUPERIMPOSED ON STAGE 3A CHRONIC KIDNEY DISEASE, UNSPECIFIED ACUTE RENAL FAILURE TYPE: ICD-10-CM

## 2023-10-12 DIAGNOSIS — E11.22 TYPE 2 DIABETES MELLITUS WITH STAGE 4 CHRONIC KIDNEY DISEASE, UNSPECIFIED WHETHER LONG TERM INSULIN USE: ICD-10-CM

## 2023-10-12 DIAGNOSIS — N18.4 CHRONIC KIDNEY DISEASE, STAGE 4 (SEVERE): ICD-10-CM

## 2023-10-12 DIAGNOSIS — E11.21 DIABETIC NEPHROPATHY ASSOCIATED WITH TYPE 2 DIABETES MELLITUS: ICD-10-CM

## 2023-10-12 DIAGNOSIS — I50.42 CHRONIC COMBINED SYSTOLIC AND DIASTOLIC HEART FAILURE: ICD-10-CM

## 2023-10-12 DIAGNOSIS — N18.31 ACUTE RENAL FAILURE SUPERIMPOSED ON STAGE 3A CHRONIC KIDNEY DISEASE, UNSPECIFIED ACUTE RENAL FAILURE TYPE: ICD-10-CM

## 2023-10-12 DIAGNOSIS — Q62.11 HYDRONEPHROSIS WITH URETEROPELVIC JUNCTION (UPJ) OBSTRUCTION: ICD-10-CM

## 2023-10-12 LAB
ALBUMIN SERPL BCP-MCNC: 2.8 G/DL (ref 3.5–5.2)
ALP SERPL-CCNC: 156 U/L (ref 55–135)
ALT SERPL W/O P-5'-P-CCNC: 18 U/L (ref 10–44)
ANION GAP SERPL CALC-SCNC: 9 MMOL/L (ref 8–16)
AST SERPL-CCNC: 26 U/L (ref 10–40)
BASOPHILS # BLD AUTO: 0.04 K/UL (ref 0–0.2)
BASOPHILS NFR BLD: 0.7 % (ref 0–1.9)
BILIRUB SERPL-MCNC: 0.3 MG/DL (ref 0.1–1)
BNP SERPL-MCNC: 299 PG/ML (ref 0–99)
BUN SERPL-MCNC: 38 MG/DL (ref 8–23)
CALCIUM SERPL-MCNC: 8.2 MG/DL (ref 8.7–10.5)
CHLORIDE SERPL-SCNC: 104 MMOL/L (ref 95–110)
CO2 SERPL-SCNC: 19 MMOL/L (ref 23–29)
CREAT SERPL-MCNC: 4.7 MG/DL (ref 0.5–1.4)
CREAT UR-MCNC: 36 MG/DL (ref 23–375)
DIFFERENTIAL METHOD: ABNORMAL
EOSINOPHIL # BLD AUTO: 0.1 K/UL (ref 0–0.5)
EOSINOPHIL NFR BLD: 1.8 % (ref 0–8)
ERYTHROCYTE [DISTWIDTH] IN BLOOD BY AUTOMATED COUNT: 13.9 % (ref 11.5–14.5)
EST. GFR  (NO RACE VARIABLE): 12.7 ML/MIN/1.73 M^2
GLUCOSE SERPL-MCNC: 516 MG/DL (ref 70–110)
HCT VFR BLD AUTO: 35.3 % (ref 40–54)
HGB BLD-MCNC: 12.2 G/DL (ref 14–18)
HIV 1+2 AB+HIV1 P24 AG SERPL QL IA: NORMAL
IMM GRANULOCYTES # BLD AUTO: 0.01 K/UL (ref 0–0.04)
IMM GRANULOCYTES NFR BLD AUTO: 0.2 % (ref 0–0.5)
LYMPHOCYTES # BLD AUTO: 1.2 K/UL (ref 1–4.8)
LYMPHOCYTES NFR BLD: 21.1 % (ref 18–48)
MCH RBC QN AUTO: 31.4 PG (ref 27–31)
MCHC RBC AUTO-ENTMCNC: 34.6 G/DL (ref 32–36)
MCV RBC AUTO: 91 FL (ref 82–98)
MONOCYTES # BLD AUTO: 0.4 K/UL (ref 0.3–1)
MONOCYTES NFR BLD: 7.2 % (ref 4–15)
NEUTROPHILS # BLD AUTO: 3.8 K/UL (ref 1.8–7.7)
NEUTROPHILS NFR BLD: 69 % (ref 38–73)
NRBC BLD-RTO: 0 /100 WBC
PLATELET # BLD AUTO: 166 K/UL (ref 150–450)
PMV BLD AUTO: 11.7 FL (ref 9.2–12.9)
POCT GLUCOSE: 207 MG/DL (ref 70–110)
POCT GLUCOSE: 458 MG/DL (ref 70–110)
POTASSIUM SERPL-SCNC: 5.7 MMOL/L (ref 3.5–5.1)
PROT SERPL-MCNC: 6.7 G/DL (ref 6–8.4)
RBC # BLD AUTO: 3.88 M/UL (ref 4.6–6.2)
SODIUM SERPL-SCNC: 132 MMOL/L (ref 136–145)
SODIUM UR-SCNC: 81 MMOL/L (ref 20–250)
WBC # BLD AUTO: 5.54 K/UL (ref 3.9–12.7)

## 2023-10-12 PROCEDURE — 99285 EMERGENCY DEPT VISIT HI MDM: CPT | Mod: 25

## 2023-10-12 PROCEDURE — 96374 THER/PROPH/DIAG INJ IV PUSH: CPT

## 2023-10-12 PROCEDURE — 99204 OFFICE O/P NEW MOD 45 MIN: CPT | Mod: S$GLB,,, | Performed by: INTERNAL MEDICINE

## 2023-10-12 PROCEDURE — 1101F PR PT FALLS ASSESS DOC 0-1 FALLS W/OUT INJ PAST YR: ICD-10-PCS | Mod: CPTII,S$GLB,, | Performed by: INTERNAL MEDICINE

## 2023-10-12 PROCEDURE — 3066F PR DOCUMENTATION OF TREATMENT FOR NEPHROPATHY: ICD-10-PCS | Mod: CPTII,S$GLB,, | Performed by: INTERNAL MEDICINE

## 2023-10-12 PROCEDURE — 99204 PR OFFICE/OUTPT VISIT, NEW, LEVL IV, 45-59 MIN: ICD-10-PCS | Mod: S$GLB,,, | Performed by: INTERNAL MEDICINE

## 2023-10-12 PROCEDURE — 94640 AIRWAY INHALATION TREATMENT: CPT

## 2023-10-12 PROCEDURE — 1160F RVW MEDS BY RX/DR IN RCRD: CPT | Mod: CPTII,S$GLB,, | Performed by: INTERNAL MEDICINE

## 2023-10-12 PROCEDURE — 1159F PR MEDICATION LIST DOCUMENTED IN MEDICAL RECORD: ICD-10-PCS | Mod: CPTII,S$GLB,, | Performed by: INTERNAL MEDICINE

## 2023-10-12 PROCEDURE — 82570 ASSAY OF URINE CREATININE: CPT | Mod: 91 | Performed by: HOSPITALIST

## 2023-10-12 PROCEDURE — 99999 PR PBB SHADOW E&M-EST. PATIENT-LVL IV: ICD-10-PCS | Mod: PBBFAC,,, | Performed by: INTERNAL MEDICINE

## 2023-10-12 PROCEDURE — 25000003 PHARM REV CODE 250: Performed by: EMERGENCY MEDICINE

## 2023-10-12 PROCEDURE — 1160F PR REVIEW ALL MEDS BY PRESCRIBER/CLIN PHARMACIST DOCUMENTED: ICD-10-PCS | Mod: CPTII,S$GLB,, | Performed by: INTERNAL MEDICINE

## 2023-10-12 PROCEDURE — 3051F HG A1C>EQUAL 7.0%<8.0%: CPT | Mod: CPTII,S$GLB,, | Performed by: INTERNAL MEDICINE

## 2023-10-12 PROCEDURE — 93010 EKG 12-LEAD: ICD-10-PCS | Mod: ,,, | Performed by: INTERNAL MEDICINE

## 2023-10-12 PROCEDURE — 83880 ASSAY OF NATRIURETIC PEPTIDE: CPT | Performed by: EMERGENCY MEDICINE

## 2023-10-12 PROCEDURE — 3051F PR MOST RECENT HEMOGLOBIN A1C LEVEL 7.0 - < 8.0%: ICD-10-PCS | Mod: CPTII,S$GLB,, | Performed by: INTERNAL MEDICINE

## 2023-10-12 PROCEDURE — 3288F FALL RISK ASSESSMENT DOCD: CPT | Mod: CPTII,S$GLB,, | Performed by: INTERNAL MEDICINE

## 2023-10-12 PROCEDURE — 82962 GLUCOSE BLOOD TEST: CPT

## 2023-10-12 PROCEDURE — 1126F AMNT PAIN NOTED NONE PRSNT: CPT | Mod: CPTII,S$GLB,, | Performed by: INTERNAL MEDICINE

## 2023-10-12 PROCEDURE — 3062F POS MACROALBUMINURIA REV: CPT | Mod: CPTII,S$GLB,, | Performed by: INTERNAL MEDICINE

## 2023-10-12 PROCEDURE — 1101F PT FALLS ASSESS-DOCD LE1/YR: CPT | Mod: CPTII,S$GLB,, | Performed by: INTERNAL MEDICINE

## 2023-10-12 PROCEDURE — 1159F MED LIST DOCD IN RCRD: CPT | Mod: CPTII,S$GLB,, | Performed by: INTERNAL MEDICINE

## 2023-10-12 PROCEDURE — 3077F PR MOST RECENT SYSTOLIC BLOOD PRESSURE >= 140 MM HG: ICD-10-PCS | Mod: CPTII,S$GLB,, | Performed by: INTERNAL MEDICINE

## 2023-10-12 PROCEDURE — 63600175 PHARM REV CODE 636 W HCPCS: Performed by: EMERGENCY MEDICINE

## 2023-10-12 PROCEDURE — 3077F SYST BP >= 140 MM HG: CPT | Mod: CPTII,S$GLB,, | Performed by: INTERNAL MEDICINE

## 2023-10-12 PROCEDURE — 82436 ASSAY OF URINE CHLORIDE: CPT | Performed by: HOSPITALIST

## 2023-10-12 PROCEDURE — 3066F NEPHROPATHY DOC TX: CPT | Mod: CPTII,S$GLB,, | Performed by: INTERNAL MEDICINE

## 2023-10-12 PROCEDURE — 12000002 HC ACUTE/MED SURGE SEMI-PRIVATE ROOM

## 2023-10-12 PROCEDURE — 93010 ELECTROCARDIOGRAM REPORT: CPT | Mod: ,,, | Performed by: INTERNAL MEDICINE

## 2023-10-12 PROCEDURE — 3008F PR BODY MASS INDEX (BMI) DOCUMENTED: ICD-10-PCS | Mod: CPTII,S$GLB,, | Performed by: INTERNAL MEDICINE

## 2023-10-12 PROCEDURE — 99999 PR PBB SHADOW E&M-EST. PATIENT-LVL IV: CPT | Mod: PBBFAC,,, | Performed by: INTERNAL MEDICINE

## 2023-10-12 PROCEDURE — 3062F PR POS MACROALBUMINURIA RESULT DOCUMENTED/REVIEW: ICD-10-PCS | Mod: CPTII,S$GLB,, | Performed by: INTERNAL MEDICINE

## 2023-10-12 PROCEDURE — 3078F DIAST BP <80 MM HG: CPT | Mod: CPTII,S$GLB,, | Performed by: INTERNAL MEDICINE

## 2023-10-12 PROCEDURE — 3288F PR FALLS RISK ASSESSMENT DOCUMENTED: ICD-10-PCS | Mod: CPTII,S$GLB,, | Performed by: INTERNAL MEDICINE

## 2023-10-12 PROCEDURE — 96375 TX/PRO/DX INJ NEW DRUG ADDON: CPT

## 2023-10-12 PROCEDURE — 87389 HIV-1 AG W/HIV-1&-2 AB AG IA: CPT | Performed by: PHYSICIAN ASSISTANT

## 2023-10-12 PROCEDURE — 93005 ELECTROCARDIOGRAM TRACING: CPT

## 2023-10-12 PROCEDURE — 3008F BODY MASS INDEX DOCD: CPT | Mod: CPTII,S$GLB,, | Performed by: INTERNAL MEDICINE

## 2023-10-12 PROCEDURE — 1126F PR PAIN SEVERITY QUANTIFIED, NO PAIN PRESENT: ICD-10-PCS | Mod: CPTII,S$GLB,, | Performed by: INTERNAL MEDICINE

## 2023-10-12 PROCEDURE — 3078F PR MOST RECENT DIASTOLIC BLOOD PRESSURE < 80 MM HG: ICD-10-PCS | Mod: CPTII,S$GLB,, | Performed by: INTERNAL MEDICINE

## 2023-10-12 PROCEDURE — 84300 ASSAY OF URINE SODIUM: CPT | Performed by: HOSPITALIST

## 2023-10-12 PROCEDURE — 25000242 PHARM REV CODE 250 ALT 637 W/ HCPCS: Performed by: EMERGENCY MEDICINE

## 2023-10-12 PROCEDURE — 94761 N-INVAS EAR/PLS OXIMETRY MLT: CPT

## 2023-10-12 PROCEDURE — 80053 COMPREHEN METABOLIC PANEL: CPT | Mod: 91 | Performed by: EMERGENCY MEDICINE

## 2023-10-12 PROCEDURE — 85025 COMPLETE CBC W/AUTO DIFF WBC: CPT | Mod: 91 | Performed by: EMERGENCY MEDICINE

## 2023-10-12 RX ORDER — CALCIUM GLUCONATE 20 MG/ML
1 INJECTION, SOLUTION INTRAVENOUS
Status: COMPLETED | OUTPATIENT
Start: 2023-10-12 | End: 2023-10-12

## 2023-10-12 RX ORDER — IBUPROFEN 200 MG
16 TABLET ORAL
Status: DISCONTINUED | OUTPATIENT
Start: 2023-10-12 | End: 2023-10-17 | Stop reason: HOSPADM

## 2023-10-12 RX ORDER — TALC
6 POWDER (GRAM) TOPICAL NIGHTLY PRN
Status: DISCONTINUED | OUTPATIENT
Start: 2023-10-12 | End: 2023-10-17 | Stop reason: HOSPADM

## 2023-10-12 RX ORDER — ALBUTEROL SULFATE 2.5 MG/.5ML
10 SOLUTION RESPIRATORY (INHALATION)
Status: COMPLETED | OUTPATIENT
Start: 2023-10-12 | End: 2023-10-12

## 2023-10-12 RX ORDER — IBUPROFEN 200 MG
24 TABLET ORAL
Status: DISCONTINUED | OUTPATIENT
Start: 2023-10-12 | End: 2023-10-17 | Stop reason: HOSPADM

## 2023-10-12 RX ORDER — PROCHLORPERAZINE EDISYLATE 5 MG/ML
5 INJECTION INTRAMUSCULAR; INTRAVENOUS EVERY 6 HOURS PRN
Status: DISCONTINUED | OUTPATIENT
Start: 2023-10-12 | End: 2023-10-17 | Stop reason: HOSPADM

## 2023-10-12 RX ORDER — ACETAMINOPHEN 325 MG/1
650 TABLET ORAL EVERY 4 HOURS PRN
Status: DISCONTINUED | OUTPATIENT
Start: 2023-10-12 | End: 2023-10-17 | Stop reason: HOSPADM

## 2023-10-12 RX ORDER — SODIUM CHLORIDE 0.9 % (FLUSH) 0.9 %
10 SYRINGE (ML) INJECTION
Status: DISCONTINUED | OUTPATIENT
Start: 2023-10-12 | End: 2023-10-17 | Stop reason: HOSPADM

## 2023-10-12 RX ORDER — HEPARIN SODIUM 5000 [USP'U]/ML
5000 INJECTION, SOLUTION INTRAVENOUS; SUBCUTANEOUS EVERY 8 HOURS
Status: DISCONTINUED | OUTPATIENT
Start: 2023-10-13 | End: 2023-10-17 | Stop reason: HOSPADM

## 2023-10-12 RX ORDER — ONDANSETRON 2 MG/ML
4 INJECTION INTRAMUSCULAR; INTRAVENOUS EVERY 8 HOURS PRN
Status: DISCONTINUED | OUTPATIENT
Start: 2023-10-12 | End: 2023-10-17 | Stop reason: HOSPADM

## 2023-10-12 RX ORDER — INSULIN ASPART 100 [IU]/ML
0-10 INJECTION, SOLUTION INTRAVENOUS; SUBCUTANEOUS EVERY 6 HOURS PRN
Status: DISCONTINUED | OUTPATIENT
Start: 2023-10-12 | End: 2023-10-13

## 2023-10-12 RX ORDER — GLUCAGON 1 MG
1 KIT INJECTION
Status: DISCONTINUED | OUTPATIENT
Start: 2023-10-12 | End: 2023-10-13

## 2023-10-12 RX ORDER — SODIUM CHLORIDE 9 MG/ML
INJECTION, SOLUTION INTRAVENOUS CONTINUOUS
Status: DISCONTINUED | OUTPATIENT
Start: 2023-10-12 | End: 2023-10-13

## 2023-10-12 RX ORDER — INSULIN GLARGINE 300 U/ML
30 INJECTION, SOLUTION SUBCUTANEOUS DAILY
Qty: 15 ML | Refills: 1 | Status: ON HOLD | OUTPATIENT
Start: 2023-10-12 | End: 2023-10-17 | Stop reason: HOSPADM

## 2023-10-12 RX ORDER — INDOMETHACIN 25 MG/1
50 CAPSULE ORAL
Status: COMPLETED | OUTPATIENT
Start: 2023-10-12 | End: 2023-10-12

## 2023-10-12 RX ORDER — NALOXONE HCL 0.4 MG/ML
0.02 VIAL (ML) INJECTION
Status: DISCONTINUED | OUTPATIENT
Start: 2023-10-12 | End: 2023-10-17 | Stop reason: HOSPADM

## 2023-10-12 RX ADMIN — ALBUTEROL SULFATE 10 MG: 2.5 SOLUTION RESPIRATORY (INHALATION) at 07:10

## 2023-10-12 RX ADMIN — CALCIUM GLUCONATE 1 G: 20 INJECTION, SOLUTION INTRAVENOUS at 09:10

## 2023-10-12 RX ADMIN — SODIUM BICARBONATE 50 MEQ: 84 INJECTION, SOLUTION INTRAVENOUS at 08:10

## 2023-10-12 RX ADMIN — INSULIN HUMAN 5 UNITS: 100 INJECTION, SOLUTION PARENTERAL at 08:10

## 2023-10-12 NOTE — TELEPHONE ENCOUNTER
Called patient per message from diabetes educator   He is currently in the ER right now.   He has been off his pump for a couple of days due to pump malfunction. He says he tried to follow up with education today to get the pump fixed but was told he needed to go to the ER   He is unsure if he has toujeo at home. ??? I sent an RX to the pharmacy   Right now his BG is 400. In the ED  Discussed insulin pump back plan   But he has a poor understanding of his insulin pump back up plan   He needs reinforcement   Discussed Toujeo 30 units daily -- but he is not understanding at all

## 2023-10-12 NOTE — TELEPHONE ENCOUNTER
----- Message from Hermelinda Arcos sent at 10/12/2023 11:39 AM CDT -----  Contact: [phone]  [email or web address]     Patient is calling for Medical Advice regarding:can not connect to omipod 5 to take sugar     How long has patient had these symptoms:    Pharmacy name and phone#:    Would like response via [employer]:   call back     Comments:

## 2023-10-12 NOTE — ED TRIAGE NOTES
Jian Arrieta, a 68 y.o. male presents to the ED w/ complaint of abnormal labs. Pt states he was sent to the ED by his nephrologist re his elevated labs.    Triage note:  Chief Complaint   Patient presents with    Abnormal Lab     Elevated Cr     Review of patient's allergies indicates:  No Known Allergies  Past Medical History:   Diagnosis Date    Alcohol abuse     Anasarca 1/28/2019    Anemia     Anticoagulant long-term use     Arthropathy associated with neurological disorder 9/2/2015    Atherosclerosis     Charcot foot due to diabetes mellitus     Chronic combined systolic and diastolic heart failure 01/29/2019 1-28-19 Left VentricleModerate decreased ejection fraction at 30%. Normal left ventricular cavity size. Normal wall thickness observed. Grade I (mild) left ventricular diastolic dysfunction consistent with impaired relaxation. Normal left atrial pressure. Moderate, global hypokinesis(see wall scoring diagram). Right VentricleNormal cavity size, wall thickness and ejection fraction. Wall motion n    Chronic pancreatitis 1/28/2019    CKD (chronic kidney disease) stage 3, GFR 30-59 ml/min     CKD (chronic kidney disease) stage 4, GFR 15-29 ml/min     Colon polyps     approx 5 yrs ago    Coronary artery disease due to calcified coronary lesion 05/08/2015    5 stents on ASA      Diabetic polyneuropathy associated with type 2 diabetes mellitus 9/2/2015    Diverticulosis 1/28/2019    DM type 2 with diabetic peripheral neuropathy 2/4/2019    Encounter for blood transfusion     Essential hypertension 1/28/2019    Former smoker 8/26/2015    Healed ulcer of left foot on examination 6/20/2017    Hematuria     Hydrocele     approx 1.5 yrs ago    Hyperphosphatemia     Hypoalbuminemia 2/4/2019    Hypocalcemia     Lymphedema of both lower extremities 1/29/2019    Mixed hyperlipidemia 5/8/2015    Morbid obesity with BMI of 50.0-59.9, adult 5/8/2015    Obstruction of right ureteropelvic junction (UPJ) due to stone  5/24/2021    Onychomycosis of multiple toenails with type 2 diabetes mellitus and peripheral neuropathy 6/20/2017    Perianal cyst     approx 2 yrs ago    Proteinuria     Pseudocyst of pancreas 1/28/2019 1-28-19 Liver has a cirrhotic morphology with no focal lesions.  Significant interval increase in ascites when compared to prior exam which may account for patient's abdominal distension.  Hypodense air-fluid collection along the body of the pancreas which is slightly smaller when compared to prior CT.  Findings may relate to pancreatic necrosis with pancreatic pseudocysts with infected pseudocyst    Skin cancer     skin cancer    Sleep apnea 8/26/2015    Status post bariatric surgery 1/11/2016    Type 2 diabetes mellitus, with long-term current use of insulin 5/8/2015    Urinary tract infection

## 2023-10-12 NOTE — ED PROVIDER NOTES
Encounter Date: 10/12/2023       History     Chief Complaint   Patient presents with    Abnormal Lab     Elevated Cr     68 y.o. y/o male with PMH of type 2 DM, HTN, HLD, HFrEF&HFpEF, portal vein thrombosis with mild cirrhosis & chronic pancreatitis c/b ascites s/p venoplasty, CAD s/p PCI x5 on Plavix with subsequent CABG, right proximal ureteral stone s/p right ureteroscopy with laser lithotripsy, cystoscopy, right JJ ureteral stent exchange in 2021 status post sent by Nephrology for acute on chronic kidney injury setting of known CKD.  Creatinine went up to 4.7 potassium is 5.2.  Creatinine was 4.0 proximally 3 days ago.  Nephrology recommend RP U.S. to rule out an obstruction and chest x-ray to rule out heart failure reduced ejection fracture.  They recommend diuretics if is heart failure exacerbation after ruling out obstruction     Echo 2019  · Mildly decreased left ventricular systolic function. The estimated ejection fraction is 45-50%  · Left ventricular diastolic dysfunction.  · Normal right ventricular systolic function.  · Normal central venous pressure (3 mm Hg).  · Extremely technically challenging study, poor endocardial visualization, would recommend repeating with contrast if additional information is desired.      The history is provided by the patient and medical records.     Review of patient's allergies indicates:  No Known Allergies  Past Medical History:   Diagnosis Date    Alcohol abuse     Anasarca 1/28/2019    Anemia     Anticoagulant long-term use     Arthropathy associated with neurological disorder 9/2/2015    Atherosclerosis     Charcot foot due to diabetes mellitus     Chronic combined systolic and diastolic heart failure 01/29/2019 1-28-19 Left VentricleModerate decreased ejection fraction at 30%. Normal left ventricular cavity size. Normal wall thickness observed. Grade I (mild) left ventricular diastolic dysfunction consistent with impaired relaxation. Normal left atrial pressure.  Moderate, global hypokinesis(see wall scoring diagram). Right VentricleNormal cavity size, wall thickness and ejection fraction. Wall motion n    Chronic pancreatitis 1/28/2019    CKD (chronic kidney disease) stage 3, GFR 30-59 ml/min     CKD (chronic kidney disease) stage 4, GFR 15-29 ml/min     Colon polyps     approx 5 yrs ago    Coronary artery disease due to calcified coronary lesion 05/08/2015    5 stents on ASA      Diabetic polyneuropathy associated with type 2 diabetes mellitus 9/2/2015    Diverticulosis 1/28/2019    DM type 2 with diabetic peripheral neuropathy 2/4/2019    Encounter for blood transfusion     Essential hypertension 1/28/2019    Former smoker 8/26/2015    Healed ulcer of left foot on examination 6/20/2017    Hematuria     Hydrocele     approx 1.5 yrs ago    Hyperphosphatemia     Hypoalbuminemia 2/4/2019    Hypocalcemia     Lymphedema of both lower extremities 1/29/2019    Mixed hyperlipidemia 5/8/2015    Morbid obesity with BMI of 50.0-59.9, adult 5/8/2015    Obstruction of right ureteropelvic junction (UPJ) due to stone 5/24/2021    Onychomycosis of multiple toenails with type 2 diabetes mellitus and peripheral neuropathy 6/20/2017    Perianal cyst     approx 2 yrs ago    Proteinuria     Pseudocyst of pancreas 1/28/2019 1-28-19 Liver has a cirrhotic morphology with no focal lesions.  Significant interval increase in ascites when compared to prior exam which may account for patient's abdominal distension.  Hypodense air-fluid collection along the body of the pancreas which is slightly smaller when compared to prior CT.  Findings may relate to pancreatic necrosis with pancreatic pseudocysts with infected pseudocyst    Skin cancer     skin cancer    Sleep apnea 8/26/2015    Status post bariatric surgery 1/11/2016    Type 2 diabetes mellitus, with long-term current use of insulin 5/8/2015    Urinary tract infection      Past Surgical History:   Procedure Laterality Date    ANGIOGRAPHY N/A  6/28/2019    Procedure: ANGIOGRAM-PV STENT;  Surgeon: Ewa Diagnostic Provider;  Location: [facility] OR;  Service: Radiology;  Laterality: N/A;    ANGIOPLASTY      total x5 stents    COLONOSCOPY N/A 10/6/2015    Procedure: COLONOSCOPY;  Surgeon: Shekhar Richards MD;  Location: [facility] (2ND FLR);  Service: Endoscopy;  Laterality: N/A;  BMI over 55/2nd floor case    5 day hold Plavix, Dr Kwadwo Arroyo    COLONOSCOPY N/A 5/13/2021    Procedure: COLONOSCOPY;  Surgeon: Huan Brumfield MD;  Location: [facility] ENDO;  Service: Endoscopy;  Laterality: N/A;    CORONARY ANGIOGRAPHY Right 3/20/2019    Procedure: ANGIOGRAM, CORONARY ARTERY;  Surgeon: Bob Duque MD;  Location: [facility] CATH LAB;  Service: Cardiology;  Laterality: Right;    CORONARY ARTERY BYPASS GRAFT  2017    x3    CORONARY BYPASS GRAFT ANGIOGRAPHY  3/20/2019    Procedure: Bypass graft study;  Surgeon: Bob Duque MD;  Location: [facility] CATH LAB;  Service: Cardiology;;    CYST REMOVAL      CYSTOSCOPY Right 6/30/2021    Procedure: CYSTOSCOPY;  Surgeon: William Diaz MD;  Location: [facility] 1ST FLR;  Service: Urology;  Laterality: Right;    CYSTOSCOPY W/ URETERAL STENT PLACEMENT Right 6/16/2021    Procedure: CYSTOSCOPY, WITH URETERAL STENT INSERTION;  Surgeon: William Diaz MD;  Location: [facility] 2ND FLR;  Service: Urology;  Laterality: Right;  FLUORO LESS THAN 1 HOUR    ENDOSCOPIC ULTRASOUND OF UPPER GASTROINTESTINAL TRACT N/A 2/26/2020    Procedure: ULTRASOUND, UPPER GI TRACT, ENDOSCOPIC;  Surgeon: Robbie Yang MD;  Location: [facility];  Service: Endoscopy;  Laterality: N/A;    ESOPHAGOGASTRODUODENOSCOPY N/A 7/8/2019    Procedure: ESOPHAGOGASTRODUODENOSCOPY (EGD);  Surgeon: Huan Brumfield MD;  Location: [facility] ENDO;  Service: Endoscopy;  Laterality: N/A;    ESOPHAGOGASTRODUODENOSCOPY N/A 5/13/2021    Procedure: EGD (ESOPHAGOGASTRODUODENOSCOPY);  Surgeon: Huan Brumfield MD;  Location: [facility];  Service: Endoscopy;  Laterality: N/A;    EXCISION OF  HYDROCELE Bilateral 6/16/2021    Procedure: HYDROCELE REPAIR;  Surgeon: William Diaz MD;  Location: [facility] OR 2ND FLR;  Service: Urology;  Laterality: Bilateral;  2 HOURS    FLUOROSCOPY N/A 6/16/2021    Procedure: FLUOROSCOPY;  Surgeon: William Diaz MD;  Location: [facility] OR 2ND FLR;  Service: Urology;  Laterality: N/A;    GASTRECTOMY      INSERTION OF DIALYSIS CATHETER N/A 5/17/2019    Procedure: pleurx;  Surgeon: Ewa Diagnostic Provider;  Location: [facility] OR 2ND FLR;  Service: General;  Laterality: N/A;  Room 188/[facility]    KNEE ARTHROSCOPY      LAPAROSCOPIC CHOLECYSTECTOMY N/A 5/4/2020    Procedure: CHOLECYSTECTOMY, LAPAROSCOPIC;  Surgeon: SON Rowe MD;  Location: [facility] OR;  Service: General;  Laterality: N/A;    LASER LITHOTRIPSY N/A 6/30/2021    Procedure: LITHOTRIPSY, USING LASER;  Surgeon: William Diaz MD;  Location: [facility] OR 1ST FLR;  Service: Urology;  Laterality: N/A;    LIVER BIOPSY N/A 5/4/2020    Procedure: BIOPSY, LIVER, Laproscopic ;  Surgeon: SON Rowe MD;  Location: [facility] OR;  Service: General;  Laterality: N/A;    perianal surgery      perianal cyst removed    PYELOSCOPY Right 6/30/2021    Procedure: PYELOSCOPY;  Surgeon: William Diaz MD;  Location: [facility] OR 1ST FLR;  Service: Urology;  Laterality: Right;    REPLACEMENT OF STENT Right 6/30/2021    Procedure: REPLACEMENT, STENT;  Surgeon: William Diaz MD;  Location: [facility] OR 1ST FLR;  Service: Urology;  Laterality: Right;    URETEROSCOPY Right 6/30/2021    Procedure: URETEROSCOPY FLEXIBLE URETEROSCOPE;  Surgeon: William Diaz MD;  Location: [facility] OR 1ST FLR;  Service: Urology;  Laterality: Right;     Family History   Problem Relation Age of Onset    Cancer Mother     Cancer Father     Heart disease Father     Obesity Sister     Parkinsonism Brother     No Known Problems Paternal Grandmother     Cancer Paternal Grandfather     Cancer Brother     Diabetes Maternal Grandmother     Stroke Maternal Grandfather     Cirrhosis Neg Hx       Social History     Tobacco Use    Smoking status: Former     Current packs/day: 0.00     Average packs/day: 2.0 packs/day for 30.0 years (60.0 ttl pk-yrs)     Types: Cigarettes     Start date: 1975     Quit date: 2005     Years since quittin.7    Smokeless tobacco: Never   Substance Use Topics    Alcohol use: No     Comment: started ~, reports 1 shot daily, max 3 shots daily, vague about alcohol consumption. Last drink 2018    Drug use: No     Review of Systems    Physical Exam     Initial Vitals [10/12/23 1541]   BP Pulse Resp Temp SpO2   (!) 219/105 91 20 98.1 °F (36.7 °C) 98 %      MAP       --         Physical Exam    Nursing note and vitals reviewed.  Constitutional: He appears well-developed. No distress.   HENT:   Head: Normocephalic and atraumatic.   Nose: Nose normal.   Eyes: EOM are normal. Pupils are equal, round, and reactive to light.   Neck: Neck supple. No tracheal deviation present. No JVD present.   Cardiovascular:  Normal rate, regular rhythm, normal heart sounds and intact distal pulses.     Exam reveals no gallop and no friction rub.       No murmur heard.  Pulmonary/Chest: No respiratory distress. He has no wheezes. He has no rhonchi. He has rales (minimal bibasilar rales).   Abdominal: Abdomen is soft. Bowel sounds are normal. He exhibits no distension. There is no abdominal tenderness. There is no rebound and no guarding.   Musculoskeletal:         General: Edema present. Normal range of motion.      Cervical back: Neck supple.      Comments: 2+ edema to bilateral legs     Neurological: He is alert and oriented to person, place, and time. He has normal strength. No cranial nerve deficit or sensory deficit. GCS score is 15. GCS eye subscore is 4. GCS verbal subscore is 5. GCS motor subscore is 6.   Skin: Skin is warm and dry. Capillary refill takes less than 2 seconds. No rash noted.   Psychiatric: He has a normal mood and affect.         ED Course   Critical  Care    Date/Time: 10/13/2023 10:19 AM    Performed by: Paxton Kaiser MD  Authorized by: Sharla Duran MD  Direct patient critical care time: 25 minutes  Additional history critical care time: 20 minutes  Ordering / reviewing critical care time: 10 minutes  Documentation critical care time: 10 minutes  Other critical care time: 5 minutes  Total critical care time (exclusive of procedural time) : 70 minutes  Critical care time was exclusive of separately billable procedures and treating other patients and teaching time.  Critical care was necessary to treat or prevent imminent or life-threatening deterioration of the following conditions: renal failure and metabolic crisis.  Critical care was time spent personally by me on the following activities: discussions with consultants, interpretation of cardiac output measurements, evaluation of patient's response to treatment, examination of patient, obtaining history from patient or surrogate, ordering and performing treatments and interventions, ordering and review of laboratory studies, ordering and review of radiographic studies, pulse oximetry, re-evaluation of patient's condition and review of old charts.        Labs Reviewed   B-TYPE NATRIURETIC PEPTIDE - Abnormal; Notable for the following components:       Result Value     (*)     All other components within normal limits   CBC W/ AUTO DIFFERENTIAL - Abnormal; Notable for the following components:    RBC 3.88 (*)     Hemoglobin 12.2 (*)     Hematocrit 35.3 (*)     MCH 31.4 (*)     All other components within normal limits   COMPREHENSIVE METABOLIC PANEL - Abnormal; Notable for the following components:    Sodium 132 (*)     Potassium 5.7 (*)     CO2 19 (*)     Glucose 516 (*)     BUN 38 (*)     Creatinine 4.7 (*)     Calcium 8.2 (*)     Albumin 2.8 (*)     Alkaline Phosphatase 156 (*)     eGFR 12.7 (*)     All other components within normal limits   POCT GLUCOSE - Abnormal; Notable for the following  components:    POCT Glucose 458 (*)     All other components within normal limits   POCT GLUCOSE - Abnormal; Notable for the following components:    POCT Glucose 207 (*)     All other components within normal limits   HIV 1 / 2 ANTIBODY    Narrative:     Release to patient->Immediate   SODIUM, URINE, RANDOM    Narrative:     Specimen Source->Urine   CREATININE, URINE, RANDOM    Narrative:     Specimen Source->Urine   CHLORIDE, URINE, RANDOM   CHLORIDE, URINE, RANDOM    Narrative:     ADD ON URINE CHLORIDE PER DR JOAQUIN GRANADOS/ORDER# [id number] @ 00:07    Specimen Source->Urine   POCT GLUCOSE MONITORING CONTINUOUS          Imaging Results               CT Abdomen Pelvis  Without Contrast (Final result)  Result time 10/12/23 22:08:05      Final result by Rakan García MD (10/12/23 22:08:05)                   Impression:      Mild right hydronephrosis and right hydroureter with gradual tapering without evidence for ureteral calculus, these findings may relate to sequelae of recently passed calculus, clinical and historical correlation is needed.    Mild perinephric stranding bilaterally, nonspecific however correlation for UTI/pyelonephritis is needed.    Mild urinary bladder wall thickening may relate to incomplete distention however correlation for UTI/cystitis is needed.    Mild circumferential thickening of the distal descending colon/proximal sigmoid colon with mild pericolonic haziness, correlation for mild colitis to include mild diverticulitis is needed.    Right adrenal nodule likely representing adrenal adenoma.    Additional findings as above.    This report was flagged in Epic as abnormal.      Electronically signed by: Rakan García  Date:    10/12/2023  Time:    22:08               Narrative:    EXAMINATION:  CT ABDOMEN PELVIS WITHOUT CONTRAST    CLINICAL HISTORY:  Flank pain, kidney stone suspected;    TECHNIQUE:  Low dose axial images, sagittal and coronal reformations were obtained from the lung  bases to the pubic symphysis.  Intravenous contrast and oral contrast was not utilized.    COMPARISON:  CT examination of the abdomen and pelvis without contrast June 27, 2022    FINDINGS:  The lung bases demonstrate mild motion artifact and atelectatic change.  A postoperative change of the stomach is noted.  The stomach is predominantly decompressed.  The gallbladder appears surgically absent.  Density of the right lobe of the liver likely relates to postprocedural coiling.  There is associated artifact.  When accounting for limitations of the examination the appearance of the liver, pancreas, spleen and adrenal glands is stable without evidence for acute process.  Right adrenal nodule again noted, measuring approximately 7.5 Hounsfield units, likely adrenal adenoma.    Nonobstructing calcification at the midpole of the left kidney is again noted.  There is mild perinephric stranding bilaterally, this is mildly more prominent than the prior study and is nonspecific, correlation for renal disease to include UTI/pyelonephritis is needed.  On the left there is no evidence for ureteral calculus or obstructive uropathy.  On the right there is mild right hydronephrosis and mild right hydroureter with tapering of the right ureter, there is no ureteral calculus seen.  These findings may relate to sequelae of recently passed calculus, clinical and historical correlation is needed.  Mild urinary bladder wall thickening may relate to incomplete distention however correlation for UTI/cystitis is needed.    The arterial vascular structures including the aorta demonstrate atherosclerotic change.  The aorta appears normal in caliber otherwise not optimally evaluated on this noncontrast examination.  Small periaortic retroperitoneal lymph nodes are noted, not enlarged by size criteria.  The prostate is prominent with mild calcifications/mineralization noted.    There are small umbilical and periumbilical fat density hernias  without bowel involvement.    There is no evidence for small bowel obstructive process.  The appendix is identified, it does not appear inflamed.  There is mild air and stool noted throughout the colon and there are diverticula of the colon.  There is a segment of the left colon at the junction of the distal descending colon in the proximal sigmoid colon as seen on axial images 115 through 134 that demonstrates appearance of mild circumferential thickening with mild pericolonic haziness.  This may relate to mild colitis to include mild diverticulitis for which clinical correlation is needed.  There is no evidence for free intraperitoneal air, there is no colonic obstructive change there is no abscess collection.    The osseous structures demonstrate chronic change.  There is diminished mineralization and degenerative change noted.                                       X-Ray Chest AP Portable (Final result)  Result time 10/12/23 18:35:56      Final result by Gonsalo Bland MD (10/12/23 18:35:56)                   Impression:      1. Pulmonary findings suggest edema, correlation and follow-up advised.      Electronically signed by: Gonsalo Bland MD  Date:    10/12/2023  Time:    18:35               Narrative:    EXAMINATION:  XR CHEST AP PORTABLE    CLINICAL HISTORY:  Chest Pain;    TECHNIQUE:  Single frontal view of the chest was performed.    COMPARISON:  05/01/2020    FINDINGS:  The cardiomediastinal silhouette is prominent, magnified by technique noting calcification of the aorta..  There is no pleural effusion.  The trachea is midline.  The lungs are symmetrically expanded bilaterally with coarse interstitial attenuation bilaterally, primarily in a perihilar distribution..  No large focal consolidation seen.  There is no pneumothorax.  The osseous structures are remarkable for degenerative change..                                       Medications   sodium chloride 0.9% flush 10 mL (has no administration  in time range)   melatonin tablet 6 mg (has no administration in time range)   ondansetron injection 4 mg (has no administration in time range)   prochlorperazine injection Soln 5 mg (has no administration in time range)   acetaminophen tablet 650 mg (has no administration in time range)   naloxone 0.4 mg/mL injection 0.02 mg (has no administration in time range)   glucose chewable tablet 16 g (has no administration in time range)   glucose chewable tablet 24 g (has no administration in time range)   heparin (porcine) injection 5,000 Units (5,000 Units Subcutaneous Given 10/13/23 0641)   glucagon (human recombinant) injection 1 mg (has no administration in time range)   insulin aspart U-100 pen 0-10 Units (3 Units Subcutaneous Given 10/13/23 0259)   dextrose 10% bolus 125 mL 125 mL (has no administration in time range)   dextrose 10% bolus 250 mL 250 mL (has no administration in time range)   insulin detemir U-100 (Levemir) pen 20 Units (20 Units Subcutaneous Given 10/13/23 0259)   tamsulosin 24 hr capsule 0.4 mg (0.4 mg Oral Given 10/13/23 0259)   hydrALAZINE tablet 50 mg (has no administration in time range)   amLODIPine tablet 10 mg (10 mg Oral Given 10/13/23 0902)   clindamycin in D5W 600 mg/50 mL IVPB 600 mg (has no administration in time range)   albuterol sulfate nebulizer solution 10 mg (10 mg Nebulization Given 10/12/23 1934)   calcium gluconate 1 g in NS IVPB (premixed) (0 g Intravenous Stopped 10/12/23 2146)   insulin regular injection 5 Units 0.05 mL (5 Units Intravenous Given 10/12/23 2002)   sodium bicarbonate solution 50 mEq (50 mEq Intravenous Given 10/12/23 2013)   hydrALAZINE tablet 50 mg (50 mg Oral Given 10/13/23 0258)     Medical Decision Making  Signed out to oncoming physician pending CT abdomen pelvis and plan for admission for further management of acute on chronic kidney injury along with hyperglycemia and hyperkalemia    Amount and/or Complexity of Data Reviewed  Labs: ordered.  Radiology:  ordered.    Risk  OTC drugs.  Prescription drug management.  Decision regarding hospitalization.               ED Course as of 10/13/23 1018   u Oct 12, 2023   2009 68 y.o. y/o male with PMH of type 2 DM, HTN, HLD, HFrEF&HFpEF, portal vein thrombosis with mild cirrhosis & chronic pancreatitis c/b ascites s/p venoplasty, CAD s/p PCI x5 on Plavix with subsequent CABG, right proximal ureteral stone s/p right ureteroscopy with laser lithotripsy, cystoscopy, right JJ ureteral stent exchange in 2021 status post sent by Nephrology for acute on chronic kidney injury setting of known CKD.  Creatinine went up to 4.7 potassium is 5.2.  Baseline creatinine was 2.4 three months ago and 4.0 approx 3 days. Nephrology recommend retroperitoneal altered to rule out an obstruction and chest x-ray to rule out heart failure exacerbation.  They recommend diuretics if is heart failure exacerbation after ruling out obstruction [BD]      ED Course User Index  [BD] Paxton Kaiser MD                    Clinical Impression:   Final diagnoses:  [I10] Hypertension  [N17.9] ELIEZER (acute kidney injury)        ED Disposition Condition    Admit Stable                Paxton Kaiser MD  10/13/23 1021

## 2023-10-12 NOTE — FIRST PROVIDER EVALUATION
Emergency Department TeleTriage Encounter Note      CHIEF COMPLAINT    Chief Complaint   Patient presents with    Abnormal Lab     Elevated Cr       VITAL SIGNS   Initial Vitals [10/12/23 1541]   BP Pulse Resp Temp SpO2   (!) 219/105 91 20 98.1 °F (36.7 °C) 98 %      MAP       --            ALLERGIES    Review of patient's allergies indicates:  No Known Allergies    PROVIDER TRIAGE NOTE  Patient presents with complaint of elevated kidney and liver function.  States he was called by his physicians to come to the ER.  Reportedly, 1 of them called and spoke to someone in the emergency room.  He is had extensive lab work already performed today.  He denies any specific complaints.  He is noted to be hypertensive.      Phy:   Constitutional: well nourished, well developed, appearing stated age, NAD        Initial orders will be placed and care will be transferred to an alternate provider when patient is roomed for a full evaluation. Any additional orders and the final disposition will be determined by that provider.        ORDERS  Labs Reviewed   HIV 1 / 2 ANTIBODY       ED Orders (720h ago, onward)      Start Ordered     Status Ordering Provider    10/12/23 1543 10/12/23 1542  HIV 1/2 Ag/Ab (4th Gen)  STAT         Acknowledged ZEINA GILLESPIE              Virtual Visit Note: The provider triage portion of this emergency department evaluation and documentation was performed via [employer], a HIPAA-compliant telemedicine application, in concert with a tele-presenter in the room. A face to face patient evaluation with one of my colleagues will occur once the patient is placed in an emergency department room.      DISCLAIMER: This note was prepared with [employer] voice recognition transcription software. Garbled syntax, mangled pronouns, and other bizarre constructions may be attributed to that software system.

## 2023-10-13 PROBLEM — Z96.41 INSULIN PUMP STATUS: Status: ACTIVE | Noted: 2023-10-13

## 2023-10-13 PROBLEM — I87.2 VENOUS STASIS DERMATITIS OF BOTH LOWER EXTREMITIES: Status: ACTIVE | Noted: 2023-10-13

## 2023-10-13 PROBLEM — N18.30 ACUTE RENAL FAILURE SUPERIMPOSED ON STAGE 3 CHRONIC KIDNEY DISEASE: Status: ACTIVE | Noted: 2023-10-13

## 2023-10-13 PROBLEM — N17.9 ACUTE RENAL FAILURE SUPERIMPOSED ON STAGE 3 CHRONIC KIDNEY DISEASE: Status: ACTIVE | Noted: 2023-10-13

## 2023-10-13 PROBLEM — E66.813 CLASS 3 SEVERE OBESITY DUE TO EXCESS CALORIES WITH SERIOUS COMORBIDITY AND BODY MASS INDEX (BMI) OF 40.0 TO 44.9 IN ADULT: Status: ACTIVE | Noted: 2021-03-15

## 2023-10-13 PROBLEM — I16.1 HYPERTENSIVE EMERGENCY: Status: ACTIVE | Noted: 2019-05-06

## 2023-10-13 LAB
ALBUMIN SERPL BCP-MCNC: 2.4 G/DL (ref 3.5–5.2)
ALP SERPL-CCNC: 131 U/L (ref 55–135)
ALT SERPL W/O P-5'-P-CCNC: 17 U/L (ref 10–44)
ANION GAP SERPL CALC-SCNC: 6 MMOL/L (ref 8–16)
ASCENDING AORTA: 4.11 CM
AST SERPL-CCNC: 24 U/L (ref 10–40)
AV INDEX (PROSTH): 0.8
AV MEAN GRADIENT: 4 MMHG
AV PEAK GRADIENT: 7 MMHG
AV VALVE AREA BY VELOCITY RATIO: 3.96 CM²
AV VALVE AREA: 3.92 CM²
AV VELOCITY RATIO: 0.81
BASOPHILS # BLD AUTO: 0.04 K/UL (ref 0–0.2)
BASOPHILS NFR BLD: 0.8 % (ref 0–1.9)
BILIRUB SERPL-MCNC: 0.2 MG/DL (ref 0.1–1)
BSA FOR ECHO PROCEDURE: 2.45 M2
BUN SERPL-MCNC: 37 MG/DL (ref 8–23)
CALCIUM SERPL-MCNC: 8 MG/DL (ref 8.7–10.5)
CHLORIDE SERPL-SCNC: 108 MMOL/L (ref 95–110)
CHLORIDE UR-SCNC: 80 MMOL/L (ref 25–200)
CO2 SERPL-SCNC: 20 MMOL/L (ref 23–29)
CREAT SERPL-MCNC: 4.5 MG/DL (ref 0.5–1.4)
CV ECHO LV RWT: 0.38 CM
DIFFERENTIAL METHOD: ABNORMAL
DOP CALC AO PEAK VEL: 1.35 M/S
DOP CALC AO VTI: 26.85 CM
DOP CALC LVOT AREA: 4.9 CM2
DOP CALC LVOT DIAMETER: 2.5 CM
DOP CALC LVOT PEAK VEL: 1.09 M/S
DOP CALC LVOT STROKE VOLUME: 105.24 CM3
DOP CALCLVOT PEAK VEL VTI: 21.45 CM
E WAVE DECELERATION TIME: 205.82 MSEC
E/A RATIO: 0.68
E/E' RATIO: 9.43 M/S
ECHO LV POSTERIOR WALL: 1 CM (ref 0.6–1.1)
EOSINOPHIL # BLD AUTO: 0.2 K/UL (ref 0–0.5)
EOSINOPHIL NFR BLD: 2.9 % (ref 0–8)
ERYTHROCYTE [DISTWIDTH] IN BLOOD BY AUTOMATED COUNT: 13.6 % (ref 11.5–14.5)
EST. GFR  (NO RACE VARIABLE): 13.4 ML/MIN/1.73 M^2
FRACTIONAL SHORTENING: 19 % (ref 28–44)
GLUCOSE SERPL-MCNC: 340 MG/DL (ref 70–110)
HCT VFR BLD AUTO: 33.8 % (ref 40–54)
HGB BLD-MCNC: 11.4 G/DL (ref 14–18)
IMM GRANULOCYTES # BLD AUTO: 0.01 K/UL (ref 0–0.04)
IMM GRANULOCYTES NFR BLD AUTO: 0.2 % (ref 0–0.5)
INTERVENTRICULAR SEPTUM: 1.03 CM (ref 0.6–1.1)
IVRT: 65.65 MSEC
LA MAJOR: 5.11 CM
LA MINOR: 5.35 CM
LA WIDTH: 4.48 CM
LEFT ATRIUM SIZE: 3.21 CM
LEFT ATRIUM VOLUME INDEX MOD: 35.4 ML/M2
LEFT ATRIUM VOLUME INDEX: 27.4 ML/M2
LEFT ATRIUM VOLUME MOD: 82.37 CM3
LEFT ATRIUM VOLUME: 63.9 CM3
LEFT INTERNAL DIMENSION IN SYSTOLE: 4.25 CM (ref 2.1–4)
LEFT VENTRICLE DIASTOLIC VOLUME INDEX: 56.6 ML/M2
LEFT VENTRICLE DIASTOLIC VOLUME: 131.88 ML
LEFT VENTRICLE MASS INDEX: 86 G/M2
LEFT VENTRICLE SYSTOLIC VOLUME INDEX: 34.6 ML/M2
LEFT VENTRICLE SYSTOLIC VOLUME: 80.71 ML
LEFT VENTRICULAR INTERNAL DIMENSION IN DIASTOLE: 5.24 CM (ref 3.5–6)
LEFT VENTRICULAR MASS: 200.58 G
LV LATERAL E/E' RATIO: 8.25 M/S
LV SEPTAL E/E' RATIO: 11 M/S
LYMPHOCYTES # BLD AUTO: 1.3 K/UL (ref 1–4.8)
LYMPHOCYTES NFR BLD: 25 % (ref 18–48)
MCH RBC QN AUTO: 30.2 PG (ref 27–31)
MCHC RBC AUTO-ENTMCNC: 33.7 G/DL (ref 32–36)
MCV RBC AUTO: 89 FL (ref 82–98)
MONOCYTES # BLD AUTO: 0.5 K/UL (ref 0.3–1)
MONOCYTES NFR BLD: 9.1 % (ref 4–15)
MV A" WAVE DURATION": 16.56 MSEC
MV PEAK A VEL: 0.97 M/S
MV PEAK E VEL: 0.66 M/S
MV STENOSIS PRESSURE HALF TIME: 59.69 MS
MV VALVE AREA P 1/2 METHOD: 3.69 CM2
NEUTROPHILS # BLD AUTO: 3.2 K/UL (ref 1.8–7.7)
NEUTROPHILS NFR BLD: 62 % (ref 38–73)
NRBC BLD-RTO: 0 /100 WBC
PISA TR MAX VEL: 2.11 M/S
PLATELET # BLD AUTO: 147 K/UL (ref 150–450)
PMV BLD AUTO: 11 FL (ref 9.2–12.9)
POCT GLUCOSE: 198 MG/DL (ref 70–110)
POCT GLUCOSE: 229 MG/DL (ref 70–110)
POCT GLUCOSE: 234 MG/DL (ref 70–110)
POCT GLUCOSE: 238 MG/DL (ref 70–110)
POCT GLUCOSE: 275 MG/DL (ref 70–110)
POCT GLUCOSE: 284 MG/DL (ref 70–110)
POCT GLUCOSE: 301 MG/DL (ref 70–110)
POCT GLUCOSE: 352 MG/DL (ref 70–110)
POTASSIUM SERPL-SCNC: 4.6 MMOL/L (ref 3.5–5.1)
PROT SERPL-MCNC: 5.5 G/DL (ref 6–8.4)
PULM VEIN S/D RATIO: 1.31
PV PEAK D VEL: 0.32 M/S
PV PEAK S VEL: 0.42 M/S
RA MAJOR: 4.92 CM
RA PRESSURE ESTIMATED: 3 MMHG
RA WIDTH: 3.79 CM
RBC # BLD AUTO: 3.78 M/UL (ref 4.6–6.2)
RIGHT VENTRICULAR END-DIASTOLIC DIMENSION: 3.97 CM
RV TB RVSP: 5 MMHG
SINUS: 4.13 CM
SODIUM SERPL-SCNC: 134 MMOL/L (ref 136–145)
STJ: 3.59 CM
TDI LATERAL: 0.08 M/S
TDI SEPTAL: 0.06 M/S
TDI: 0.07 M/S
TR MAX PG: 18 MMHG
TRICUSPID ANNULAR PLANE SYSTOLIC EXCURSION: 1.82 CM
TV REST PULMONARY ARTERY PRESSURE: 21 MMHG
WBC # BLD AUTO: 5.15 K/UL (ref 3.9–12.7)
Z-SCORE OF LEFT VENTRICULAR DIMENSION IN END DIASTOLE: -5.89
Z-SCORE OF LEFT VENTRICULAR DIMENSION IN END SYSTOLE: -2.3

## 2023-10-13 PROCEDURE — 80053 COMPREHEN METABOLIC PANEL: CPT | Performed by: HOSPITALIST

## 2023-10-13 PROCEDURE — 21400001 HC TELEMETRY ROOM

## 2023-10-13 PROCEDURE — 63600175 PHARM REV CODE 636 W HCPCS: Performed by: NURSE PRACTITIONER

## 2023-10-13 PROCEDURE — 85025 COMPLETE CBC W/AUTO DIFF WBC: CPT | Performed by: HOSPITALIST

## 2023-10-13 PROCEDURE — 84244 ASSAY OF RENIN: CPT | Mod: 91

## 2023-10-13 PROCEDURE — 99223 1ST HOSP IP/OBS HIGH 75: CPT | Mod: ,,, | Performed by: INTERNAL MEDICINE

## 2023-10-13 PROCEDURE — 99222 PR INITIAL HOSPITAL CARE,LEVL II: ICD-10-PCS | Mod: ,,, | Performed by: NURSE PRACTITIONER

## 2023-10-13 PROCEDURE — 99499 NO LOS: ICD-10-PCS | Mod: ,,, | Performed by: HOSPITALIST

## 2023-10-13 PROCEDURE — 36415 COLL VENOUS BLD VENIPUNCTURE: CPT | Performed by: HOSPITALIST

## 2023-10-13 PROCEDURE — 82088 ASSAY OF ALDOSTERONE: CPT | Mod: 91

## 2023-10-13 PROCEDURE — 84244 ASSAY OF RENIN: CPT

## 2023-10-13 PROCEDURE — 63600175 PHARM REV CODE 636 W HCPCS: Performed by: HOSPITALIST

## 2023-10-13 PROCEDURE — 99223 1ST HOSP IP/OBS HIGH 75: CPT | Mod: ,,, | Performed by: HOSPITALIST

## 2023-10-13 PROCEDURE — 99222 1ST HOSP IP/OBS MODERATE 55: CPT | Mod: ,,, | Performed by: NURSE PRACTITIONER

## 2023-10-13 PROCEDURE — 25000003 PHARM REV CODE 250: Performed by: HOSPITALIST

## 2023-10-13 PROCEDURE — 99223 PR INITIAL HOSPITAL CARE,LEVL III: ICD-10-PCS | Mod: ,,, | Performed by: INTERNAL MEDICINE

## 2023-10-13 PROCEDURE — 99223 PR INITIAL HOSPITAL CARE,LEVL III: ICD-10-PCS | Mod: ,,, | Performed by: HOSPITALIST

## 2023-10-13 PROCEDURE — 82088 ASSAY OF ALDOSTERONE: CPT

## 2023-10-13 PROCEDURE — 25000003 PHARM REV CODE 250: Performed by: NURSE PRACTITIONER

## 2023-10-13 PROCEDURE — 36415 COLL VENOUS BLD VENIPUNCTURE: CPT

## 2023-10-13 PROCEDURE — 99499 UNLISTED E&M SERVICE: CPT | Mod: ,,, | Performed by: HOSPITALIST

## 2023-10-13 RX ORDER — SODIUM CHLORIDE 9 MG/ML
INJECTION, SOLUTION INTRAVENOUS CONTINUOUS
Status: DISCONTINUED | OUTPATIENT
Start: 2023-10-13 | End: 2023-10-13

## 2023-10-13 RX ORDER — HYDRALAZINE HYDROCHLORIDE 25 MG/1
50 TABLET, FILM COATED ORAL ONCE
Status: COMPLETED | OUTPATIENT
Start: 2023-10-13 | End: 2023-10-13

## 2023-10-13 RX ORDER — TAMSULOSIN HYDROCHLORIDE 0.4 MG/1
0.4 CAPSULE ORAL NIGHTLY
Status: DISCONTINUED | OUTPATIENT
Start: 2023-10-13 | End: 2023-10-17 | Stop reason: HOSPADM

## 2023-10-13 RX ORDER — GLUCAGON 1 MG
1 KIT INJECTION
Status: DISCONTINUED | OUTPATIENT
Start: 2023-10-13 | End: 2023-10-17 | Stop reason: HOSPADM

## 2023-10-13 RX ORDER — AMLODIPINE BESYLATE 10 MG/1
10 TABLET ORAL DAILY
Status: DISCONTINUED | OUTPATIENT
Start: 2023-10-13 | End: 2023-10-17 | Stop reason: HOSPADM

## 2023-10-13 RX ORDER — INSULIN ASPART 100 [IU]/ML
0-5 INJECTION, SOLUTION INTRAVENOUS; SUBCUTANEOUS
Status: DISCONTINUED | OUTPATIENT
Start: 2023-10-13 | End: 2023-10-17 | Stop reason: HOSPADM

## 2023-10-13 RX ORDER — IBUPROFEN 200 MG
16 TABLET ORAL
Status: DISCONTINUED | OUTPATIENT
Start: 2023-10-13 | End: 2023-10-17 | Stop reason: HOSPADM

## 2023-10-13 RX ORDER — IBUPROFEN 200 MG
24 TABLET ORAL
Status: DISCONTINUED | OUTPATIENT
Start: 2023-10-13 | End: 2023-10-17 | Stop reason: HOSPADM

## 2023-10-13 RX ORDER — INSULIN ASPART 100 [IU]/ML
4-6 INJECTION, SOLUTION INTRAVENOUS; SUBCUTANEOUS
Status: DISCONTINUED | OUTPATIENT
Start: 2023-10-13 | End: 2023-10-15

## 2023-10-13 RX ORDER — SODIUM CHLORIDE 9 MG/ML
INJECTION, SOLUTION INTRAVENOUS CONTINUOUS
Status: ACTIVE | OUTPATIENT
Start: 2023-10-13 | End: 2023-10-14

## 2023-10-13 RX ORDER — CLINDAMYCIN PHOSPHATE 600 MG/50ML
600 INJECTION, SOLUTION INTRAVENOUS
Status: DISCONTINUED | OUTPATIENT
Start: 2023-10-13 | End: 2023-10-13

## 2023-10-13 RX ORDER — HYDRALAZINE HYDROCHLORIDE 25 MG/1
50 TABLET, FILM COATED ORAL EVERY 8 HOURS PRN
Status: DISCONTINUED | OUTPATIENT
Start: 2023-10-13 | End: 2023-10-17

## 2023-10-13 RX ADMIN — HYDRALAZINE HYDROCHLORIDE 50 MG: 25 TABLET, FILM COATED ORAL at 02:10

## 2023-10-13 RX ADMIN — TAMSULOSIN HYDROCHLORIDE 0.4 MG: 0.4 CAPSULE ORAL at 08:10

## 2023-10-13 RX ADMIN — HEPARIN SODIUM 5000 UNITS: 5000 INJECTION INTRAVENOUS; SUBCUTANEOUS at 02:10

## 2023-10-13 RX ADMIN — CLINDAMYCIN IN 5 PERCENT DEXTROSE 600 MG: 12 INJECTION, SOLUTION INTRAVENOUS at 10:10

## 2023-10-13 RX ADMIN — HEPARIN SODIUM 5000 UNITS: 5000 INJECTION INTRAVENOUS; SUBCUTANEOUS at 10:10

## 2023-10-13 RX ADMIN — SODIUM CHLORIDE: 9 INJECTION, SOLUTION INTRAVENOUS at 04:10

## 2023-10-13 RX ADMIN — INSULIN DETEMIR 20 UNITS: 100 INJECTION, SOLUTION SUBCUTANEOUS at 02:10

## 2023-10-13 RX ADMIN — HEPARIN SODIUM 5000 UNITS: 5000 INJECTION INTRAVENOUS; SUBCUTANEOUS at 06:10

## 2023-10-13 RX ADMIN — INSULIN ASPART 6 UNITS: 100 INJECTION, SOLUTION INTRAVENOUS; SUBCUTANEOUS at 06:10

## 2023-10-13 RX ADMIN — INSULIN ASPART 1 UNITS: 100 INJECTION, SOLUTION INTRAVENOUS; SUBCUTANEOUS at 08:10

## 2023-10-13 RX ADMIN — INSULIN ASPART 4 UNITS: 100 INJECTION, SOLUTION INTRAVENOUS; SUBCUTANEOUS at 01:10

## 2023-10-13 RX ADMIN — TAMSULOSIN HYDROCHLORIDE 0.4 MG: 0.4 CAPSULE ORAL at 02:10

## 2023-10-13 RX ADMIN — AMLODIPINE BESYLATE 10 MG: 10 TABLET ORAL at 09:10

## 2023-10-13 RX ADMIN — INSULIN DETEMIR 25 UNITS: 100 INJECTION, SOLUTION SUBCUTANEOUS at 08:10

## 2023-10-13 RX ADMIN — INSULIN ASPART 3 UNITS: 100 INJECTION, SOLUTION INTRAVENOUS; SUBCUTANEOUS at 02:10

## 2023-10-13 RX ADMIN — SODIUM CHLORIDE: 9 INJECTION, SOLUTION INTRAVENOUS at 03:10

## 2023-10-13 NOTE — ED NOTES
Pt placed on portable telemetry monitoring box # [id number].  Ability to visualize pt's rhythm confirmed with telemetry.  NSR with a HR of 80 noted.

## 2023-10-13 NOTE — SUBJECTIVE & OBJECTIVE
Past Medical History:   Diagnosis Date    Alcohol abuse     Anasarca 1/28/2019    Anemia     Anticoagulant long-term use     Arthropathy associated with neurological disorder 9/2/2015    Atherosclerosis     Charcot foot due to diabetes mellitus     Chronic combined systolic and diastolic heart failure 01/29/2019 1-28-19 Left VentricleModerate decreased ejection fraction at 30%. Normal left ventricular cavity size. Normal wall thickness observed. Grade I (mild) left ventricular diastolic dysfunction consistent with impaired relaxation. Normal left atrial pressure. Moderate, global hypokinesis(see wall scoring diagram). Right VentricleNormal cavity size, wall thickness and ejection fraction. Wall motion n    Chronic pancreatitis 1/28/2019    CKD (chronic kidney disease) stage 3, GFR 30-59 ml/min     CKD (chronic kidney disease) stage 4, GFR 15-29 ml/min     Colon polyps     approx 5 yrs ago    Coronary artery disease due to calcified coronary lesion 05/08/2015    5 stents on ASA      Diabetic polyneuropathy associated with type 2 diabetes mellitus 9/2/2015    Diverticulosis 1/28/2019    DM type 2 with diabetic peripheral neuropathy 2/4/2019    Encounter for blood transfusion     Essential hypertension 1/28/2019    Former smoker 8/26/2015    Healed ulcer of left foot on examination 6/20/2017    Hematuria     Hydrocele     approx 1.5 yrs ago    Hyperphosphatemia     Hypoalbuminemia 2/4/2019    Hypocalcemia     Lymphedema of both lower extremities 1/29/2019    Mixed hyperlipidemia 5/8/2015    Morbid obesity with BMI of 50.0-59.9, adult 5/8/2015    Obstruction of right ureteropelvic junction (UPJ) due to stone 5/24/2021    Onychomycosis of multiple toenails with type 2 diabetes mellitus and peripheral neuropathy 6/20/2017    Perianal cyst     approx 2 yrs ago    Proteinuria     Pseudocyst of pancreas 1/28/2019 1-28-19 Liver has a cirrhotic morphology with no focal lesions.  Significant interval increase in ascites  when compared to prior exam which may account for patient's abdominal distension.  Hypodense air-fluid collection along the body of the pancreas which is slightly smaller when compared to prior CT.  Findings may relate to pancreatic necrosis with pancreatic pseudocysts with infected pseudocyst    Skin cancer     skin cancer    Sleep apnea 8/26/2015    Status post bariatric surgery 1/11/2016    Type 2 diabetes mellitus, with long-term current use of insulin 5/8/2015    Urinary tract infection        Past Surgical History:   Procedure Laterality Date    ANGIOGRAPHY N/A 6/28/2019    Procedure: ANGIOGRAM-PV STENT;  Surgeon: Ewa Diagnostic Provider;  Location: [facility] OR;  Service: Radiology;  Laterality: N/A;    ANGIOPLASTY      total x5 stents    COLONOSCOPY N/A 10/6/2015    Procedure: COLONOSCOPY;  Surgeon: Shekhar Richards MD;  Location: [facility] (2ND FLR);  Service: Endoscopy;  Laterality: N/A;  BMI over 55/2nd floor case    5 day hold Plavix, Dr Kwadwo Arroyo    COLONOSCOPY N/A 5/13/2021    Procedure: COLONOSCOPY;  Surgeon: Huan Brumfeild MD;  Location: [facility] ENDO;  Service: Endoscopy;  Laterality: N/A;    CORONARY ANGIOGRAPHY Right 3/20/2019    Procedure: ANGIOGRAM, CORONARY ARTERY;  Surgeon: Bob Duque MD;  Location: [facility] CATH LAB;  Service: Cardiology;  Laterality: Right;    CORONARY ARTERY BYPASS GRAFT  2017    x3    CORONARY BYPASS GRAFT ANGIOGRAPHY  3/20/2019    Procedure: Bypass graft study;  Surgeon: Bob Duque MD;  Location: [facility] CATH LAB;  Service: Cardiology;;    CYST REMOVAL      CYSTOSCOPY Right 6/30/2021    Procedure: CYSTOSCOPY;  Surgeon: William Diaz MD;  Location: [facility] 1ST FLR;  Service: Urology;  Laterality: Right;    CYSTOSCOPY W/ URETERAL STENT PLACEMENT Right 6/16/2021    Procedure: CYSTOSCOPY, WITH URETERAL STENT INSERTION;  Surgeon: William Diaz MD;  Location: [facility] 2ND FLR;  Service: Urology;  Laterality: Right;  FLUORO LESS THAN 1 HOUR    ENDOSCOPIC ULTRASOUND OF  UPPER GASTROINTESTINAL TRACT N/A 2/26/2020    Procedure: ULTRASOUND, UPPER GI TRACT, ENDOSCOPIC;  Surgeon: Robbie Yang MD;  Location: [facility] ENDO;  Service: Endoscopy;  Laterality: N/A;    ESOPHAGOGASTRODUODENOSCOPY N/A 7/8/2019    Procedure: ESOPHAGOGASTRODUODENOSCOPY (EGD);  Surgeon: Huan Brumfield MD;  Location: [facility] ENDO;  Service: Endoscopy;  Laterality: N/A;    ESOPHAGOGASTRODUODENOSCOPY N/A 5/13/2021    Procedure: EGD (ESOPHAGOGASTRODUODENOSCOPY);  Surgeon: Huan Brumfield MD;  Location: [facility] ENDO;  Service: Endoscopy;  Laterality: N/A;    EXCISION OF HYDROCELE Bilateral 6/16/2021    Procedure: HYDROCELE REPAIR;  Surgeon: William Diaz MD;  Location: [facility] OR 2ND FLR;  Service: Urology;  Laterality: Bilateral;  2 HOURS    FLUOROSCOPY N/A 6/16/2021    Procedure: FLUOROSCOPY;  Surgeon: William Diaz MD;  Location: [facility] OR 2ND FLR;  Service: Urology;  Laterality: N/A;    GASTRECTOMY      INSERTION OF DIALYSIS CATHETER N/A 5/17/2019    Procedure: pleurx;  Surgeon: [facility] Diagnostic Provider;  Location: [facility] OR 2ND FLR;  Service: General;  Laterality: N/A;  Room 188/[facility]    KNEE ARTHROSCOPY      LAPAROSCOPIC CHOLECYSTECTOMY N/A 5/4/2020    Procedure: CHOLECYSTECTOMY, LAPAROSCOPIC;  Surgeon: SON Rowe MD;  Location: [facility] OR;  Service: General;  Laterality: N/A;    LASER LITHOTRIPSY N/A 6/30/2021    Procedure: LITHOTRIPSY, USING LASER;  Surgeon: William Diaz MD;  Location: [facility] OR 1ST FLR;  Service: Urology;  Laterality: N/A;    LIVER BIOPSY N/A 5/4/2020    Procedure: BIOPSY, LIVER, Laproscopic ;  Surgeon: SON Rowe MD;  Location: [facility] OR;  Service: General;  Laterality: N/A;    perianal surgery      perianal cyst removed    PYELOSCOPY Right 6/30/2021    Procedure: PYELOSCOPY;  Surgeon: William Diaz MD;  Location: [facility] OR 1ST FLR;  Service: Urology;  Laterality: Right;    REPLACEMENT OF STENT Right 6/30/2021    Procedure: REPLACEMENT, STENT;  Surgeon: William Diaz MD;  [facility];  Service: Urology;  Laterality: Right;    URETEROSCOPY Right 6/30/2021    Procedure: URETEROSCOPY FLEXIBLE URETEROSCOPE;  Surgeon: William Diaz MD;  Location: [facility] OR [address] [place] [address];  Service: Urology;  Laterality: Right;       Review of patient's allergies indicates:  No Known Allergies    No current facility-administered medications on file prior to encounter.     Current Outpatient Medications on File Prior to Encounter   Medication Sig    doxycycline (VIBRAMYCIN) 100 MG Cap Take 1 capsule (100 mg total) by mouth 2 (two) times daily. for 14 doses    insulin lispro-aabc (LYUMJEV U-100 INSULIN) 100 unit/mL Inject 80 Units into the skin continuous. USES IN OMNIPOD INSULIN PUMP. USE AS DIRECTED. DO NOT DIRECTLY INJECT.    tamsulosin (FLOMAX) 0.4 mg Cap Take 1 capsule (0.4 mg total) by mouth every evening.    aspirin (ECOTRIN) 81 MG EC tablet Take 1 tablet (81 mg total) by mouth once daily.    blood-glucose sensor (DEXCOM G6 SENSOR) Sandi Inject 1 each into the skin every 10 days.    blood-glucose transmitter (DEXCOM G6 TRANSMITTER) Sandi Inject 1 each into the skin every 10 days.    glucagon (BAQSIMI) 3 mg/actuation Spry 1 each by Nasal route daily as needed (if glucose is less than 70 - can repeat in 1 hour if still low/less than 70).    HUMALOG U-100 INSULIN 100 unit/mL injection     insulin glargine, TOUJEO, (TOUJEO SOLOSTAR U-300 INSULIN) 300 unit/mL (1.5 mL) InPn pen Inject 30 Units into the skin once daily. Can increase dose by 2 units higher to get to goal fasting glucose 100 - 150 - for max TDD 60 units. Takes daily IN AN EMERGENCY only if OFF OF insulin pump.    insulin pump cart,automated,BT (OMNIPOD 5 G6 PODS, GEN 5,) Crtg Inject 1 each into the skin every other day. Use with omnipod 5 pdm as directed.    pen needle, diabetic 32 gauge x 5/32" Ndle Use with toujeo insulin one daily only as Emergency use/back up insulin - if OFF OF your insulin pump.     Family History       Problem Relation (Age " of Onset)    Cancer Mother, Father, Paternal Grandfather, Brother    Diabetes Maternal Grandmother    Heart disease Father    No Known Problems Paternal Grandmother    Obesity Sister    Parkinsonism Brother    Stroke Maternal Grandfather          Tobacco Use    Smoking status: Former     Current packs/day: 0.00     Average packs/day: 2.0 packs/day for 30.0 years (60.0 ttl pk-yrs)     Types: Cigarettes     Start date: 1975     Quit date: 2005     Years since quittin.7    Smokeless tobacco: Never   Substance and Sexual Activity    Alcohol use: No     Comment: started ~, reports 1 shot daily, max 3 shots daily, vague about alcohol consumption. Last drink 2018    Drug use: No    Sexual activity: Not on file     Review of Systems   Constitutional:  Negative for activity change, chills, fever and unexpected weight change.   HENT:  Negative for congestion and sore throat.    Respiratory:  Negative for cough, shortness of breath and wheezing.    Cardiovascular:  Positive for leg swelling. Negative for chest pain and palpitations.   Gastrointestinal:  Negative for abdominal pain, blood in stool, nausea and vomiting.   Genitourinary:  Positive for flank pain. Negative for dysuria and hematuria.   Musculoskeletal:  Negative for arthralgias and neck pain.   Skin:  Negative for color change and rash.   Neurological:  Negative for dizziness, seizures and numbness.   Psychiatric/Behavioral:  Negative for hallucinations and suicidal ideas.      Objective:     Vital Signs (Most Recent):  Temp: 98 °F (36.7 °C) (10/13/23 0139)  Pulse: 85 (10/13/23 0139)  Resp: (!) 21 (10/13/23 0139)  BP: (!) 197/90 (10/13/23 0139)  SpO2: 96 % (10/13/23 0139) Vital Signs (24h Range):  Temp:  [98 °F (36.7 °C)-98.1 °F (36.7 °C)] 98 °F (36.7 °C)  Pulse:  [76-91] 85  Resp:  [19-23] 21  SpO2:  [96 %-99 %] 96 %  BP: (154-221)/() 197/90     Weight: 126.6 kg (279 lb 1.6 oz)  Body mass index is 43.71 kg/m².     Physical Exam  Vitals  reviewed.   Constitutional:       General: He is not in acute distress.     Appearance: He is well-developed.   HENT:      Head: Normocephalic and atraumatic.   Eyes:      Extraocular Movements: Extraocular movements intact.      Pupils: Pupils are equal, round, and reactive to light.   Neck:      Vascular: No JVD.      Trachea: No tracheal deviation.   Cardiovascular:      Rate and Rhythm: Normal rate and regular rhythm.      Heart sounds: No murmur heard.     No friction rub. No gallop.   Pulmonary:      Effort: No respiratory distress.      Breath sounds: Normal breath sounds. No wheezing or rales.   Abdominal:      General: Bowel sounds are normal. There is no distension.      Palpations: Abdomen is soft. There is no mass.      Tenderness: There is no abdominal tenderness.   Musculoskeletal:         General: No deformity.      Cervical back: Neck supple.      Right lower leg: Edema present.      Left lower leg: Edema present.      Comments: B/L 2+ edema, pt. Reports chronic lymphedema at baseline   Lymphadenopathy:      Cervical: No cervical adenopathy.   Skin:     General: Skin is warm and dry.      Findings: No rash.   Neurological:      Mental Status: He is alert and oriented to person, place, and time.              CRANIAL NERVES     CN III, IV, VI   Pupils are equal, round, and reactive to light.       Significant Labs: All pertinent labs within the past 24 hours have been reviewed.    Significant Imaging: I have reviewed all pertinent imaging results/findings within the past 24 hours.

## 2023-10-13 NOTE — PLAN OF CARE
[facility] - Intensive Care ([facility]-)  Initial Discharge Assessment       Primary Care Provider: Leon Barajas DO    Admission Diagnosis: Hypertension [I10]  ELIEZER (acute kidney injury) [N17.9]    Admission Date: 10/12/2023  Expected Discharge Date: 10/16/2023    Transition of Care Barriers: (P) None    Payor: Dennison Parko [facility] MCARE / Plan: [employer] [facility] MEDICARE ADVANTAGE / Product Type: Medicare Advantage /     Extended Emergency Contact Information  Primary Emergency Contact: Geri Arrieta  Address: [address] [place], [place] [address] [place] [address] [place]  Mobile Phone: [phone]  Relation: Spouse  Secondary Emergency Contact: Nena Jackman  Mobile Phone: [phone]  Relation: Sister   needed? No    Discharge Plan A: (P) Home  Discharge Plan B: (P) Home with family      [id number] [place], [place] - [address]  [address]  [place] [address]  Phone: [phone] Fax: [phone]    [facility] ([place] - [place] - [address] [place] [address]  [address] [place] [address]  [place] [address]  Phone: [phone] Fax: [phone]      Initial Assessment (most recent)       Adult Discharge Assessment - 10/13/23 1515          Discharge Assessment    Assessment Type Discharge Planning Assessment (P)      Confirmed/corrected address, phone number and insurance Yes (P)      Confirmed Demographics Correct on Facesheet (P)      Source of Information patient (P)      Communicated CAMILLE with patient/caregiver No (P)      Reason For Admission ARF (P)      People in Home spouse (P)      Facility Arrived From: home (P)      Do you expect to return to your current living situation? Yes (P)      Do you have help at home or someone to help you manage your care at home? Yes (P)      Who are your caregiver(s) and their phone number(s)? Geri Arrieta spouse [phone] (P)      Prior to hospitilization cognitive status: Unable to Assess (P)      Current  cognitive status: Alert/Oriented (P)      Home Layout Bathroom on 2nd floor;Bedroom on 2nd floor (P)      Equipment Currently Used at Home none (P)      Readmission within 30 days? No (P)      Do you currently have service(s) that help you manage your care at home? No (P)      Do you take prescription medications? Yes (P)      Do you have prescription coverage? Yes (P)      Coverage [employer] (P)      Do you have any problems affording any of your prescribed medications? No (P)      Who is going to help you get home at discharge? Will drive self (P)      How do you get to doctors appointments? car, drives self (P)      Are you on dialysis? No (P)      Do you take coumadin? No (P)      DME Needed Upon Discharge  none (P)      Discharge Plan discussed with: Patient (P)      Transition of Care Barriers None (P)      Discharge Plan A Home (P)      Discharge Plan B Home with family (P)         Physical Activity    On average, how many days per week do you engage in moderate to strenuous exercise (like a brisk walk)? 7 days (P)      On average, how many minutes do you engage in exercise at this level? Patient refused (P)         Financial Resource Strain    How hard is it for you to pay for the very basics like food, housing, medical care, and heating? Not very hard (P)         Stress    Do you feel stress - tense, restless, nervous, or anxious, or unable to sleep at night because your mind is troubled all the time - these days? Patient refused (P)         Social Connections    In a typical week, how many times do you talk on the phone with family, friends, or neighbors? More than three times a week (P)      How often do you get together with friends or relatives? More than three times a week (P)      How often do you attend Latter-day or Hindu services? Never (P)      Do you belong to any clubs or organizations such as Latter-day groups, unions, fraternal or athletic groups, or school groups? No (P)      How often do you attend  "meetings of the clubs or organizations you belong to? Never (P)      Are you , , , , never , or living with a partner?  (P)         Alcohol Use    Q1: How often do you have a drink containing alcohol? Patient refused (P)    "occasionally", would not elaborate    Q2: How many drinks containing alcohol do you have on a typical day when you are drinking? Patient refused (P)      Q3: How often do you have six or more drinks on one occasion? Patient refused (P)         OTHER    Name(s) of People in Home Geri Arrieta spouse [phone] (P)                  CM spoke with pt in room.  He lives in garage portion, second story with 19 steps; wife lives in house portion of home.  Pt denies any trouble with navigating stairs.  He will drive self home and drives self to MD appts.  No 30D readmission.  No Hh, DEM, coumadin or HD.  Indep with ADL's.  [facility] [address]    Angela Hanson, GREGORYN, BS, RN, CCM                   "

## 2023-10-13 NOTE — SUBJECTIVE & OBJECTIVE
Interval HPI:   Overnight events: NAEON. BG above goal ranges but has trended down since admission. Patient received Levemir this morning. NPO for OR today for cysto/possible R ureteral stent. Creatinine 4.5.   Eating:   NPO  Nausea: No  Hypoglycemia and intervention: No  Fever: No  TPN and/or TF: No  If yes, type of TF/TPN and rate: n/a    PMH, PSH, FH, SH reviewed     ROS:  Constitutional: Negative for weight changes.  Eyes: Negative for visual disturbance.  Respiratory: Negative for cough.   Cardiovascular: Positive for leg swelling   Gastrointestinal: Negative for nausea.  Endocrine: Negative for polyuria, polydipsia.  Musculoskeletal: Negative for back pain.  Skin: Negative for rash.  Neurological: Negative for syncope.  Psychiatric/Behavioral: Negative for depression.      Review of Systems    Current Medications and/or Treatments Impacting Glycemic Control  Immunotherapy:    Immunosuppressants       None          Steroids:   Hormones (From admission, onward)      Start     Stop Route Frequency Ordered    10/12/23 2327  melatonin tablet 6 mg         -- Oral Nightly PRN 10/12/23 2227          Pressors:    Autonomic Drugs (From admission, onward)      None          Hyperglycemia/Diabetes Medications:   Antihyperglycemics (From admission, onward)      Start     Stop Route Frequency Ordered    10/13/23 0300  insulin detemir U-100 (Levemir) pen 20 Units         -- SubQ Nightly 10/13/23 0148    10/12/23 2329  insulin aspart U-100 pen 0-10 Units         -- SubQ Every 6 hours PRN 10/12/23 2229             PHYSICAL EXAMINATION:  Vitals:    10/13/23 1222   BP:    Pulse: 72   Resp:    Temp:      Body mass index is 43.7 kg/m².     Physical Exam   Constitutional: Well developed, well nourished, obese, NAD.  ENT: External ears no masses with nose patent; normal hearing.  Neck: Supple; trachea midline.  Cardiovascular: Normal heart sounds, BLE edema present.   Lungs: Normal effort; lungs anterior bilaterally clear to  auscultation.  Abdomen: Soft, no masses, no hernias.  MS: No clubbing or cyanosis of nails noted; unable to assess gait.  Skin: No rashes, lesions, or ulcers; no nodules. Injection sites are ok. No lipo hypertropthy or atrophy.  Psychiatric: Good judgement and insight; normal mood and affect.  Neurological: Cranial nerves are grossly intact.   Foot: Nails in good condition, no amputations noted.

## 2023-10-13 NOTE — SUBJECTIVE & OBJECTIVE
Interval History: see above    Review of Systems   Constitutional:  Positive for activity change. Negative for appetite change and fever.   HENT:  Negative for trouble swallowing.    Respiratory:  Negative for cough and shortness of breath.    Cardiovascular:  Positive for leg swelling. Negative for chest pain.   Gastrointestinal:  Positive for abdominal distention. Negative for blood in stool, diarrhea, nausea and vomiting.   Genitourinary:  Negative for difficulty urinating.   Musculoskeletal:  Negative for neck stiffness.   Neurological:  Negative for headaches.   Psychiatric/Behavioral:  Negative for agitation, behavioral problems and confusion.      Objective:     Vital Signs (Most Recent):  Temp: 98.7 °F (37.1 °C) (10/13/23 0435)  Pulse: 97 (10/13/23 0435)  Resp: 19 (10/13/23 0435)  BP: (!) 169/79 (10/13/23 0435)  SpO2: 96 % (10/13/23 0435) Vital Signs (24h Range):  Temp:  [98 °F (36.7 °C)-98.7 °F (37.1 °C)] 98.7 °F (37.1 °C)  Pulse:  [76-97] 97  Resp:  [19-23] 19  SpO2:  [96 %-99 %] 96 %  BP: (154-221)/() 169/79     Weight: 126.6 kg (279 lb 1.6 oz)  Body mass index is 43.71 kg/m².  No intake or output data in the 24 hours ending 10/13/23 0727      Physical Exam  Constitutional:       General: He is not in acute distress.     Appearance: Normal appearance. He is obese. He is not ill-appearing, toxic-appearing or diaphoretic.   HENT:      Head: Normocephalic and atraumatic.      Nose: Nose normal.      Mouth/Throat:      Mouth: Mucous membranes are moist.      Pharynx: Oropharynx is clear.   Eyes:      General: No scleral icterus.     Extraocular Movements: Extraocular movements intact.      Conjunctiva/sclera: Conjunctivae normal.      Pupils: Pupils are equal, round, and reactive to light.   Cardiovascular:      Rate and Rhythm: Normal rate and regular rhythm.      Pulses: Normal pulses.      Heart sounds: Normal heart sounds.   Pulmonary:      Effort: Pulmonary effort is normal. No respiratory  distress.      Breath sounds: Normal breath sounds. No stridor. No wheezing, rhonchi or rales.   Chest:      Chest wall: No tenderness.   Abdominal:      General: Abdomen is flat. Bowel sounds are normal. There is no distension.      Palpations: Abdomen is soft.      Tenderness: There is no abdominal tenderness. There is no right CVA tenderness, left CVA tenderness, guarding or rebound.   Musculoskeletal:         General: No swelling, tenderness, deformity or signs of injury. Normal range of motion.      Cervical back: Normal range of motion and neck supple. No rigidity or tenderness.      Right lower leg: Edema present.      Left lower leg: Edema present.   Lymphadenopathy:      Cervical: No cervical adenopathy.   Skin:     General: Skin is warm and dry.      Coloration: Skin is not jaundiced.      Findings: No erythema or rash.   Neurological:      General: No focal deficit present.      Mental Status: He is alert and oriented to person, place, and time. Mental status is at baseline.      Motor: No weakness.   Psychiatric:         Mood and Affect: Mood normal.         Behavior: Behavior normal.         Thought Content: Thought content normal.         Judgment: Judgment normal.             Significant Labs: All pertinent labs within the past 24 hours have been reviewed.  CBC:   Recent Labs   Lab 10/12/23  1231 10/12/23  1802 10/13/23  0526   WBC 5.82 5.54 5.15   HGB 12.3* 12.2* 11.4*   HCT 36.8* 35.3* 33.8*    166 147*     CMP:   Recent Labs   Lab 10/12/23  1231 10/12/23  1802 10/13/23  0526   * 132* 134*   K 5.2* 5.7* 4.6    104 108   CO2 22* 19* 20*   * 516* 340*   BUN 39* 38* 37*   CREATININE 4.7* 4.7* 4.5*   CALCIUM 8.3* 8.2* 8.0*   PROT 6.5 6.7 5.5*   ALBUMIN 2.9* 2.8* 2.4*   BILITOT 0.4 0.3 0.2   ALKPHOS 160* 156* 131   AST 18 26 24   ALT 16 18 17   ANIONGAP 8 9 6*       Significant Imaging: I have reviewed all pertinent imaging results/findings within the past 24 hours.

## 2023-10-13 NOTE — ASSESSMENT & PLAN NOTE
-Cr 4.7, baseline ~2.2. Imaging shows Mild right hydronephrosis and right hydroureter with gradual tapering and FENa 8% consistent with post-obstructive etiology. Urology consult for assistance  -IV fluids, avoid nephrotoxins and continue to trend Cr    10/13- Nephrology and urology were consulted May be due to hypertensive emergency in the setting of chronic kidney disease.  Monitoring UO.- only 300 cc last night. Post void residual = 0. Had a loose stool.  Consider cardiorenal syndrome. Will get ECHO. Urology planning on stent placement in am.

## 2023-10-13 NOTE — ASSESSMENT & PLAN NOTE
-Pt. Markedly HTN on admit, reports not taking any HTN medications. Plan to start scheduled amlodipine, hydralazine ordered PRN. Monitor BG and adjust as needed

## 2023-10-13 NOTE — ASSESSMENT & PLAN NOTE
Lab Results   Component Value Date    CREATININE 4.5 (H) 10/13/2023     Avoid insulin stacking  Titrate insulin slowly

## 2023-10-13 NOTE — ASSESSMENT & PLAN NOTE
BG goal 140-180    Noted to have  on arrival and patient stating that his insulin pump malfunctioned and he is not sure how long it has been off. States the problem is his pod is not communicating with the pump.     Patient received Levemir 20 units overnight and BG at 284 this morning     Plan:  -Increase Levemir to 25 units q HS  -Start Novolog 4-6 units TID with meals (once diet progresses) Give 4 units if patient eats 25-50% of meal and 6 units if patient eats 50% or greater  -Low Dose Correction Scale  -BG monitoring ac/hs/0200    Leave insulin pump off at this time.     Discharge plans: TBD    Lab Results   Component Value Date    HGBA1C 7.6 (H) 10/09/2023         and pt. Reports that his omnipod Dash - Insulin pump must be malfunctioning, although he is not sure when it stopped working. Labs 3 days ago show normal BG.

## 2023-10-13 NOTE — ASSESSMENT & PLAN NOTE
-Cr 4.7, baseline ~2.2. Imaging shows Mild right hydronephrosis and right hydroureter with gradual tapering and FENa 8% consistent with post-obstructive etiology. Urology consult for assistance  -IV fluids, avoid nephrotoxins and continue to trend Cr

## 2023-10-13 NOTE — HPI
Reason for Consult: Management of T1DM (ELOISE), Hyperglycemia     Surgical Procedure and Date: n/a    Diabetes diagnosis year: 7-8 years ago    diagnosed with T2DM originally in 2016 -   He has history of pancreatitis in 2018 - portal vein thrombosis and underwent angioplasty.   now treated as T1 - ELOISE -     Home Diabetes Medications:    Omnipod 5 - Lyumjev   Total daily dose is 38.4 units/day   75% is coming from basal rate.   25% is coming from bolusing.   Active insulin time is 3 hours -   12am - 1.1 units/hour  7am - 1.2 units/hour   Carb ratio - 1 gram/1 unit (he puts in boluses to eat).   He gives 6 or 8 units for a meal - small or large meal.   ISF at 40  Target 110       Lab Results   Component Value Date    HGBA1C 7.6 (H) 10/09/2023       How often checking glucose at home? Dexcom G6   BG readings on regimen:  > 400 the last few days   Hypoglycemia on the regimen?  No  Missed doses on regimen?  Yes, often forgets to bolus     Diabetes Complications include:     Hyperglycemia, Diabetic nephropathy  , Diabetic chronic kidney disease     , and Diabetic peripheral neuropathy     Complicating diabetes co morbidities:   Obesity, CAD s/p CABG, CKD3, MARIE       HPI:   Patient is a 68 y.o. male with a diagnosis of MARIE (liver bx 3/2019 w/o cirrhosis), s/p sleeve gastrectomy, CAD s/p CABG, CKD3 (BL creatinine 2.2), and history of obstructive nephrolithiasis who presents from nephrology clinic w/ worsening renal function x 2 weeks. Recent admission to  w/ nephrolithiasis of R kidney. Was discharged from  w/ doxycycline out of concern for pyelonephritis. In clinic, creatinine was noted to be significantly elevated to 4.7 prompting ED referral. On arrival, reported improving but persistent flank pain. Denies any fevers/chills/N/V. Denies changes in urine output, dysuria, or hematuria. Of note, blood glucose 516 on arrival. Endocrinology consulted for management of T1DM.

## 2023-10-13 NOTE — CONSULTS
[facility] ([place] [id number])  Endocrinology  Diabetes Consult Note    Consult Requested by: Sharla Duran MD   Reason for admit: Acute renal failure superimposed on stage 3 chronic kidney disease    HISTORY OF PRESENT ILLNESS:  Reason for Consult: Management of T1DM (ELOISE), Hyperglycemia     Surgical Procedure and Date: n/a    Diabetes diagnosis year: 7-8 years ago    diagnosed with T2DM originally in 2016 -   He has history of pancreatitis in 2018 - portal vein thrombosis and underwent angioplasty.   now treated as T1 - ELOISE -     Home Diabetes Medications:    Omnipod 5 - Lyumjev   Total daily dose is 38.4 units/day   75% is coming from basal rate.   25% is coming from bolusing.   Active insulin time is 3 hours -   12am - 1.1 units/hour  7am - 1.2 units/hour   Carb ratio - 1 gram/1 unit (he puts in boluses to eat).   He gives 6 or 8 units for a meal - small or large meal.   ISF at 40  Target 110       Lab Results   Component Value Date    HGBA1C 7.6 (H) 10/09/2023       How often checking glucose at home? Dexcom G6   BG readings on regimen:  > 400 the last few days   Hypoglycemia on the regimen?  No  Missed doses on regimen?  Yes, often forgets to bolus     Diabetes Complications include:     Hyperglycemia, Diabetic nephropathy  , Diabetic chronic kidney disease     , and Diabetic peripheral neuropathy     Complicating diabetes co morbidities:   Obesity, CAD s/p CABG, CKD3, MARIE       HPI:   Patient is a 68 y.o. male with a diagnosis of MARIE (liver bx 3/2019 w/o cirrhosis), s/p sleeve gastrectomy, CAD s/p CABG, CKD3 (BL creatinine 2.2), and history of obstructive nephrolithiasis who presents from nephrology clinic w/ worsening renal function x 2 weeks. Recent admission to  w/ nephrolithiasis of R kidney. Was discharged from  w/ doxycycline out of concern for pyelonephritis. In clinic, creatinine was noted to be significantly elevated to 4.7 prompting ED referral. On arrival, reported improving  but persistent flank pain. Denies any fevers/chills/N/V. Denies changes in urine output, dysuria, or hematuria. Of note, blood glucose 516 on arrival. Endocrinology consulted for management of T1DM.         Interval HPI:   Overnight events: NAEON. BG above goal ranges but has trended down since admission. Patient received Levemir this morning. NPO for OR today for cysto/possible R ureteral stent. Creatinine 4.5.   Eating:   NPO  Nausea: No  Hypoglycemia and intervention: No  Fever: No  TPN and/or TF: No  If yes, type of TF/TPN and rate: n/a    PMH, PSH, FH, SH reviewed     ROS:  Constitutional: Negative for weight changes.  Eyes: Negative for visual disturbance.  Respiratory: Negative for cough.   Cardiovascular: Positive for leg swelling   Gastrointestinal: Negative for nausea.  Endocrine: Negative for polyuria, polydipsia.  Musculoskeletal: Negative for back pain.  Skin: Negative for rash.  Neurological: Negative for syncope.  Psychiatric/Behavioral: Negative for depression.      Review of Systems    Current Medications and/or Treatments Impacting Glycemic Control  Immunotherapy:    Immunosuppressants       None          Steroids:   Hormones (From admission, onward)      Start     Stop Route Frequency Ordered    10/12/23 2327  melatonin tablet 6 mg         -- Oral Nightly PRN 10/12/23 2227          Pressors:    Autonomic Drugs (From admission, onward)      None          Hyperglycemia/Diabetes Medications:   Antihyperglycemics (From admission, onward)      Start     Stop Route Frequency Ordered    10/13/23 0300  insulin detemir U-100 (Levemir) pen 20 Units         -- SubQ Nightly 10/13/23 0148    10/12/23 2329  insulin aspart U-100 pen 0-10 Units         -- SubQ Every 6 hours PRN 10/12/23 2229             PHYSICAL EXAMINATION:  Vitals:    10/13/23 1222   BP:    Pulse: 72   Resp:    Temp:      Body mass index is 43.7 kg/m².     Physical Exam   Constitutional: Well developed, well nourished, obese, NAD.  ENT:  "External ears no masses with nose patent; normal hearing.  Neck: Supple; trachea midline.  Cardiovascular: Normal heart sounds, BLE edema present.   Lungs: Normal effort; lungs anterior bilaterally clear to auscultation.  Abdomen: Soft, no masses, no hernias.  MS: No clubbing or cyanosis of nails noted; unable to assess gait.  Skin: No rashes, lesions, or ulcers; no nodules. Injection sites are ok. No lipo hypertropthy or atrophy.  Psychiatric: Good judgement and insight; normal mood and affect.  Neurological: Cranial nerves are grossly intact.   Foot: Nails in good condition, no amputations noted.        Labs Reviewed and Include   Recent Labs   Lab 10/13/23  0526   *   CALCIUM 8.0*   ALBUMIN 2.4*   PROT 5.5*   *   K 4.6   CO2 20*      BUN 37*   CREATININE 4.5*   ALKPHOS 131   ALT 17   AST 24   BILITOT 0.2     Lab Results   Component Value Date    WBC 5.15 10/13/2023    HGB 11.4 (L) 10/13/2023    HCT 33.8 (L) 10/13/2023    MCV 89 10/13/2023     (L) 10/13/2023     No results for input(s): "TSH", "FREET4" in the last 168 hours.  Lab Results   Component Value Date    HGBA1C 7.6 (H) 10/09/2023       Nutritional status:   Body mass index is 43.7 kg/m².  Lab Results   Component Value Date    ALBUMIN 2.4 (L) 10/13/2023    ALBUMIN 2.8 (L) 10/12/2023    ALBUMIN 2.9 (L) 10/12/2023     Lab Results   Component Value Date    PREALBUMIN 9 (L) 05/23/2019       Estimated Creatinine Clearance: 19.8 mL/min (A) (based on SCr of 4.5 mg/dL (H)).    Accu-Checks  Recent Labs     10/12/23  1926 10/12/23  2149 10/13/23  0138 10/13/23  0748 10/13/23  1117   POCTGLUCOSE 458* 207* 275* 284* 238*        ASSESSMENT and PLAN    Cardiac/Vascular  Hyperlipidemia associated with type 2 diabetes mellitus  May increase insulin resistance.         Renal/  * Acute renal failure superimposed on stage 3 chronic kidney disease  Lab Results   Component Value Date    CREATININE 4.5 (H) 10/13/2023     Avoid insulin " stacking  Titrate insulin slowly       Endocrine  Class 3 severe obesity due to excess calories with serious comorbidity and body mass index (BMI) of 40.0 to 44.9 in adult  Body mass index is 43.7 kg/m².  May increase insulin resistance.         Insulin pump status  Off at this time.     Will attempt to place new pod and restart pump tomorrow.         Type 2 diabetes mellitus with diabetic neuropathy, with long-term current use of insulin  BG goal 140-180    Noted to have  on arrival and patient stating that his insulin pump malfunctioned and he is not sure how long it has been off. States the problem is his pod is not communicating with the pump.     Patient received Levemir 20 units overnight and BG at 284 this morning     Plan:  -Increase Levemir to 25 units q HS  -Start Novolog 4-6 units TID with meals (once diet progresses) Give 4 units if patient eats 25-50% of meal and 6 units if patient eats 50% or greater  -Low Dose Correction Scale  -BG monitoring ac/hs/0200    Leave insulin pump off at this time.     Discharge plans: TBD    Lab Results   Component Value Date    HGBA1C 7.6 (H) 10/09/2023         and pt. Reports that his omnipod Dash - Insulin pump must be malfunctioning, although he is not sure when it stopped working. Labs 3 days ago show normal BG.           Plan discussed with patient, family, and RN at bedside.     Lauren Ibarra NP  Endocrinology  [facility] ([id number])

## 2023-10-13 NOTE — ASSESSMENT & PLAN NOTE
-Pt. Hyperglycemic to >500 on presentation, suspect acute insulin pump malfunction as BG normal 3 days ago and A1c 7.6  -Levemir 20 units QHS and SSI ordered with diabetic/renal diet. Endocrinology consulted for assistance  -Has insulin pump, which is turned off at this time.    - Hypoglycemic protocol    10/13- BS improving. OR for cystoscopy and stent placement in am.  May attempt to place new pod and restart pump tomorrow.  Recent Labs     10/12/23  2149 10/13/23  0138 10/13/23  0748 10/13/23  1117 10/13/23  1356 10/13/23  1533   POCTGLUCOSE 207* 275* 284* 238* 234* 198*

## 2023-10-13 NOTE — CONSULTS
[facility] ([place] [address])  Urology  Consult Note    Patient Name: Jian Arrieta  MRN: [id number]  Admission Date: 10/12/2023  Hospital Length of Stay: 1   Code Status: Full Code   Attending Provider: Sharla Duran MD   Consulting Provider: Yamini García MD  Primary Care Physician: Leon Barajas DO  Principal Problem:Acute renal failure superimposed on stage 3 chronic kidney disease    Inpatient consult to Urology  Consult performed by: Yamini García MD  Consult ordered by: Reza Wilson MD  Reason for consult: R hydronephrosis           Subjective:     HPI:  Mr. Arrieta is a 67 yo male with MARIE (liver bx 3/2019 w/o cirrhosis), s/p sleeve gastrectomy, CAD s/p CABG, and CKD3 w/ hx of nephrolithiasis admitted to  from nephrology for worsening Cr. Urology consulted for right hydronephrosis.    History of R proximal stone s/p ureteroscopy in June 2021 with Dr. Diaz recently seen in clinic 10/9/23 with plans for an outpatient MAG3 scan vs cysto with R RPG. Recently presented to ED with bilateral flank pain, Cr noted to be 2.56 from baseline of 2.2.. UA nonconcerning for infection and did not reflex to culture, however, he was discharged from ED with doxy for possible pyelonephritis. Seen in nephrology clinic yesterday with Cr elevated to 4.7 and sent to [facility] ED.    Upon assessment, afebrile resting comfortably.  Denies flank pain, hematuria.  Reports some RLQ pain on admission that has since resolved. Denies any changes in urination, feels like he is completely emptying his bladder.  Denies fevers, chills but does report one episode of night sweats a few days ago.       Labs 10/13/23: WBC 5.15 Hgb 11.4 Cr 4.5 (4.7, baseline approx 2.2) Glucose >500 in ED.  Calculated FeNa 8.    UA 10/12/23: 15 RBC, 11 WBC, rare bacteria, nitrite neg, no squams. Urine culture in process.     CTRSS 10/12/23: Mild right hydroureteronephrosis to the level of the pelvis, no left hydronephrosis, no urolithiasis  bilaterally, bladder partially distended. Small right adrenal nodule approx 1.6 cm, 5-13 HU. Moderate stool burden. Left renal calcification called by nephrology appears to be arterial calcification.      Past Medical History:   Diagnosis Date    Alcohol abuse     Anasarca 1/28/2019    Anemia     Anticoagulant long-term use     Arthropathy associated with neurological disorder 9/2/2015    Atherosclerosis     Charcot foot due to diabetes mellitus     Chronic combined systolic and diastolic heart failure 01/29/2019 1-28-19 Left VentricleModerate decreased ejection fraction at 30%. Normal left ventricular cavity size. Normal wall thickness observed. Grade I (mild) left ventricular diastolic dysfunction consistent with impaired relaxation. Normal left atrial pressure. Moderate, global hypokinesis(see wall scoring diagram). Right VentricleNormal cavity size, wall thickness and ejection fraction. Wall motion n    Chronic pancreatitis 1/28/2019    CKD (chronic kidney disease) stage 3, GFR 30-59 ml/min     CKD (chronic kidney disease) stage 4, GFR 15-29 ml/min     Colon polyps     approx 5 yrs ago    Coronary artery disease due to calcified coronary lesion 05/08/2015    5 stents on ASA      Diabetic polyneuropathy associated with type 2 diabetes mellitus 9/2/2015    Diverticulosis 1/28/2019    DM type 2 with diabetic peripheral neuropathy 2/4/2019    Encounter for blood transfusion     Essential hypertension 1/28/2019    Former smoker 8/26/2015    Healed ulcer of left foot on examination 6/20/2017    Hematuria     Hydrocele     approx 1.5 yrs ago    Hyperphosphatemia     Hypoalbuminemia 2/4/2019    Hypocalcemia     Lymphedema of both lower extremities 1/29/2019    Mixed hyperlipidemia 5/8/2015    Morbid obesity with BMI of 50.0-59.9, adult 5/8/2015    Obstruction of right ureteropelvic junction (UPJ) due to stone 5/24/2021    Onychomycosis of multiple toenails with type 2 diabetes mellitus and peripheral neuropathy  6/20/2017    Perianal cyst     approx 2 yrs ago    Proteinuria     Pseudocyst of pancreas 1/28/2019 1-28-19 Liver has a cirrhotic morphology with no focal lesions.  Significant interval increase in ascites when compared to prior exam which may account for patient's abdominal distension.  Hypodense air-fluid collection along the body of the pancreas which is slightly smaller when compared to prior CT.  Findings may relate to pancreatic necrosis with pancreatic pseudocysts with infected pseudocyst    Skin cancer     skin cancer    Sleep apnea 8/26/2015    Status post bariatric surgery 1/11/2016    Type 2 diabetes mellitus, with long-term current use of insulin 5/8/2015    Urinary tract infection        Past Surgical History:   Procedure Laterality Date    ANGIOGRAPHY N/A 6/28/2019    Procedure: ANGIOGRAM-PV STENT;  Surgeon: Ewa Diagnostic Provider;  Location: [facility] OR;  Service: Radiology;  Laterality: N/A;    ANGIOPLASTY      total x5 stents    COLONOSCOPY N/A 10/6/2015    Procedure: COLONOSCOPY;  Surgeon: Shekhar Richards MD;  Location: [facility] ENDO (2ND FLR);  Service: Endoscopy;  Laterality: N/A;  BMI over 55/2nd floor case    5 day hold Plavix, Dr Kwadwo Arroyo    COLONOSCOPY N/A 5/13/2021    Procedure: COLONOSCOPY;  Surgeon: Huan Brumfield MD;  Location: [facility] ENDO;  Service: Endoscopy;  Laterality: N/A;    CORONARY ANGIOGRAPHY Right 3/20/2019    Procedure: ANGIOGRAM, CORONARY ARTERY;  Surgeon: Bob Duque MD;  Location: [facility] CATH LAB;  Service: Cardiology;  Laterality: Right;    CORONARY ARTERY BYPASS GRAFT  2017    x3    CORONARY BYPASS GRAFT ANGIOGRAPHY  3/20/2019    Procedure: Bypass graft study;  Surgeon: Bob Duque MD;  Location: [facility] CATH LAB;  Service: Cardiology;;    CYST REMOVAL      CYSTOSCOPY Right 6/30/2021    Procedure: CYSTOSCOPY;  Surgeon: William Diaz MD;  Location: [facility] OR 1ST FLR;  Service: Urology;  Laterality: Right;    CYSTOSCOPY W/ URETERAL STENT PLACEMENT Right 6/16/2021     Procedure: CYSTOSCOPY, WITH URETERAL STENT INSERTION;  Surgeon: William Diaz MD;  Location: [facility] OR 2ND FLR;  Service: Urology;  Laterality: Right;  FLUORO LESS THAN 1 HOUR    ENDOSCOPIC ULTRASOUND OF UPPER GASTROINTESTINAL TRACT N/A 2/26/2020    Procedure: ULTRASOUND, UPPER GI TRACT, ENDOSCOPIC;  Surgeon: Robbie Yang MD;  Location: [facility] ENDO;  Service: Endoscopy;  Laterality: N/A;    ESOPHAGOGASTRODUODENOSCOPY N/A 7/8/2019    Procedure: ESOPHAGOGASTRODUODENOSCOPY (EGD);  Surgeon: Huan Brumfield MD;  Location: [facility] ENDO;  Service: Endoscopy;  Laterality: N/A;    ESOPHAGOGASTRODUODENOSCOPY N/A 5/13/2021    Procedure: EGD (ESOPHAGOGASTRODUODENOSCOPY);  Surgeon: Huan Brumfield MD;  Location: [facility] ENDO;  Service: Endoscopy;  Laterality: N/A;    EXCISION OF HYDROCELE Bilateral 6/16/2021    Procedure: HYDROCELE REPAIR;  Surgeon: William Diaz MD;  Location: [facility] OR 2ND FLR;  Service: Urology;  Laterality: Bilateral;  2 HOURS    FLUOROSCOPY N/A 6/16/2021    Procedure: FLUOROSCOPY;  Surgeon: William Diaz MD;  Location: [facility] OR 2ND FLR;  Service: Urology;  Laterality: N/A;    GASTRECTOMY      INSERTION OF DIALYSIS CATHETER N/A 5/17/2019    Procedure: pleurx;  Surgeon: Ewa Diagnostic Provider;  Location: [facility] OR 2ND FLR;  Service: General;  Laterality: N/A;  Room Counts include 234 beds at the [facility]    KNEE ARTHROSCOPY      LAPAROSCOPIC CHOLECYSTECTOMY N/A 5/4/2020    Procedure: CHOLECYSTECTOMY, LAPAROSCOPIC;  Surgeon: SON Rowe MD;  Location: [facility] OR;  Service: General;  Laterality: N/A;    LASER LITHOTRIPSY N/A 6/30/2021    Procedure: LITHOTRIPSY, USING LASER;  Surgeon: William Diaz MD;  Location: [facility] OR 1ST FLR;  Service: Urology;  Laterality: N/A;    LIVER BIOPSY N/A 5/4/2020    Procedure: BIOPSY, LIVER, Laproscopic ;  Surgeon: SON Rowe MD;  Location: [facility] OR;  Service: General;  Laterality: N/A;    perianal surgery      perianal cyst removed    PYELOSCOPY Right 6/30/2021    Procedure: PYELOSCOPY;   Surgeon: William Diaz MD;  Location: [facility] OR [address] [place] [address];  Service: Urology;  Laterality: Right;    REPLACEMENT OF STENT Right 2021    Procedure: REPLACEMENT, STENT;  Surgeon: William Diaz MD;  [facility];  Service: Urology;  Laterality: Right;    URETEROSCOPY Right 2021    Procedure: URETEROSCOPY FLEXIBLE URETEROSCOPE;  Surgeon: William Diaz MD;  Location: [facility] OR [address] [place] [address];  Service: Urology;  Laterality: Right;       Review of patient's allergies indicates:  No Known Allergies    Family History       Problem Relation (Age of Onset)    Cancer Mother, Father, Paternal Grandfather, Brother    Diabetes Maternal Grandmother    Heart disease Father    No Known Problems Paternal Grandmother    Obesity Sister    Parkinsonism Brother    Stroke Maternal Grandfather            Tobacco Use    Smoking status: Former     Current packs/day: 0.00     Average packs/day: 2.0 packs/day for 30.0 years (60.0 ttl pk-yrs)     Types: Cigarettes     Start date: 1975     Quit date: 2005     Years since quittin.7    Smokeless tobacco: Never   Substance and Sexual Activity    Alcohol use: No     Comment: started ~, reports 1 shot daily, max 3 shots daily, vague about alcohol consumption. Last drink 2018    Drug use: No    Sexual activity: Not on file       Review of Systems   Constitutional:  Negative for chills and fever.   Gastrointestinal:  Negative for nausea and vomiting.   Genitourinary:  Negative for decreased urine volume, difficulty urinating, flank pain and hematuria.       Objective:     Temp:  [98 °F (36.7 °C)-98.7 °F (37.1 °C)] 98.7 °F (37.1 °C)  Pulse:  [76-97] 97  Resp:  [19-23] 19  SpO2:  [96 %-99 %] 96 %  BP: (154-221)/() 169/79  Weight: 126.6 kg (279 lb 1.6 oz)  Body mass index is 43.71 kg/m².           Drains       Drain  Duration                  Ureteral Drain/Stent 21 0912 Right ureter 24 Fr. 848 days                     Physical Exam  Constitutional:        General: He is not in acute distress.  HENT:      Head: Normocephalic.   Eyes:      Extraocular Movements: Extraocular movements intact.   Cardiovascular:      Rate and Rhythm: Normal rate.   Pulmonary:      Effort: Pulmonary effort is normal. No respiratory distress.   Abdominal:      Palpations: Abdomen is soft.      Tenderness: There is no right CVA tenderness or left CVA tenderness.   Musculoskeletal:         General: No swelling or deformity.      Cervical back: Normal range of motion.   Skin:     General: Skin is warm and dry.   Neurological:      General: No focal deficit present.      Mental Status: He is alert.   Psychiatric:         Mood and Affect: Mood normal.         Behavior: Behavior normal.          Significant Labs:    BMP:  Recent Labs   Lab 10/12/23  1231 10/12/23  1802 10/13/23  0526   * 132* 134*   K 5.2* 5.7* 4.6    104 108   CO2 22* 19* 20*   BUN 39* 38* 37*   CREATININE 4.7* 4.7* 4.5*   CALCIUM 8.3* 8.2* 8.0*       CBC:  Recent Labs   Lab 10/12/23  1231 10/12/23  1802 10/13/23  0526   WBC 5.82 5.54 5.15   HGB 12.3* 12.2* 11.4*   HCT 36.8* 35.3* 33.8*    166 147*       All pertinent labs results from the past 24 hours have been reviewed.    Significant Imaging:  All pertinent imaging results/findings from the past 24 hours have been reviewed.                      Assessment and Plan:     * Acute renal failure superimposed on stage 3 chronic kidney disease  Jian Arrieta is a 68 y.o. M with acute renal failure and mild right hydronephrosis.     -- OR today for cysto with right retrograde pyelogram, possible R ureteral stent   -- NPO   -- case request placed   -- will obtain consent   -- please obtain PVR bladder scan   -- strict Is/Os   --Cr 4.5 from baseline around 2.2  -- FENa 7.6%  -- IVFs as able per primary.  Patient with history of CHF.    -- will f/u urine culture, UA not concerning for infection   -- rest of care per primary     Addendum: Patient ate at 1030 AM with  NPO orders in place.  PVR 0.  Recommend aggressive IVFs as able.    - NPO at midnight   - possible cysto/ R RPG with ureteral stent tomorrow       VTE Risk Mitigation (From admission, onward)           Ordered     heparin (porcine) injection 5,000 Units  Every 8 hours         10/12/23 2227     IP VTE HIGH RISK PATIENT  Once         10/12/23 2227     Place sequential compression device  Until discontinued         10/12/23 2227                    Thank you for your consult. I will follow-up with patient. Please contact us if you have any additional questions.    Yamini García MD  Urology  [facility] ([facility]

## 2023-10-13 NOTE — ASSESSMENT & PLAN NOTE
Hx of EF 30 %  ECHO 6/19:   Mildly decreased left ventricular systolic function. The estimated ejection fraction is 45-50%   Left ventricular diastolic dysfunction.   Normal right ventricular systolic function.   Normal central venous pressure (3 mm Hg).   Extremely technically challenging study, poor endocardial visualization, would recommend repeating with contrast if additional information is desired.    10/13- dc IVF until echo returns and Nephrology see pt.   ECHO 10/13:   Left Ventricle: The left ventricle is normal in size. Normal wall thickness. Septal motion is consistent with post-operative status. There is low normal systolic function with a visually estimated ejection fraction of 50 - 55%. There is normal diastolic function.    Right Ventricle: Normal right ventricular cavity size. Wall thickness is normal. Right ventricle wall motion  is normal. Systolic function is normal.    Pulmonary Artery: The estimated pulmonary artery systolic pressure is 21 mmHg.    IVC/SVC: Normal venous pressure at 3 mmHg.

## 2023-10-13 NOTE — HPI
Mr. Arrieta is a 69 yo male with MARIE (liver bx 3/2019 w/o cirrhosis), s/p sleeve gastrectomy, CAD s/p CABG, and CKD3 w/ hx of nephrolithiasis admitted to  from nephrology for worsening Cr. Urology consulted for right hydronephrosis.    History of R proximal stone s/p ureteroscopy in June 2021 with Dr. Diaz recently seen in clinic 10/9/23 with plans for an outpatient MAG3 scan vs cysto with R RPG. Recently presented to [facility] with bilateral flank pain, Cr noted to be 2.56 from baseline of 2.2.. UA nonconcerning for infection and did not reflex to culture, however, he was discharged from ED with doxy for possible pyelonephritis. Seen in nephrology clinic yesterday with Cr elevated to 4.7 and sent to [facility] ED.    Upon assessment, afebrile resting comfortably.  Denies flank pain, hematuria.  Reports some RLQ pain on admission that has since resolved. Denies any changes in urination, feels like he is completely emptying his bladder.  Denies fevers, chills but does report one episode of night sweats a few days ago.       Labs 10/13/23: WBC 5.15 Hgb 11.4 Cr 4.5 (4.7, baseline approx 2.2) Glucose >500 in ED.  Calculated FeNa 8.    UA 10/12/23: 15 RBC, 11 WBC, rare bacteria, nitrite neg, no squams. Urine culture in process.     CTRSS 10/12/23: Mild right hydroureteronephrosis to the level of the pelvis, no left hydronephrosis, no urolithiasis bilaterally, bladder partially distended. Small right adrenal nodule approx 1.6 cm, 5-13 HU. Moderate stool burden. Left renal calcification called by nephrology appears to be arterial calcification.

## 2023-10-13 NOTE — ASSESSMENT & PLAN NOTE
Jian Arrieta is a 68 y.o. M with acute renal failure and mild right hydronephrosis.     -- OR today for cysto with right retrograde pyelogram, possible R ureteral stent   -- NPO   -- case request placed   -- will obtain consent   -- please obtain PVR bladder scan   -- strict Is/Os   --Cr 4.5 from baseline around 2.2  -- FENa 7.6%  -- IVFs as able per primary.  Patient with history of CHF.    -- will f/u urine culture, UA not concerning for infection   -- rest of care per primary

## 2023-10-13 NOTE — ASSESSMENT & PLAN NOTE
-Pt. Hyperglycemic to >500 on presentation, suspect acute insulin pump malfunction as BG normal 3 days ago and A1c 7.6  -Levemir 20 units QHS and SSI ordered with diabetic/renal diet. Endocrinology consulted for assistance

## 2023-10-13 NOTE — PROGRESS NOTES
"[facility] ([address] Medicine  Progress Note    Patient Name: Jian Arrieta  MRN: [id number]  Patient Class: IP- Inpatient   Admission Date: 10/12/2023  Length of Stay: 1 days  Attending Physician: Sharla Duran MD  Primary Care Provider: Leon Barajas DO        Subjective:     Principal Problem:Acute renal failure superimposed on stage 3 chronic kidney disease        HPI:  67 yo M with PMHx MARIE (liver bx 3/2019 w/o cirrhosis), hx of sleeve gastrectomy, CAD s/p CABG, and CKD3 w/ hx of obstructive nephrolithiasis who presents to the ED from nephrology clinic for worsening renal function for last 2 weeks. Pt. Baseline Cr ~2.2, but he recently presented to [facility] ED with B/L flank pain. Cr wasmildly elevated at 2.56 and CT renal stone revealed "mild fullness of the proximal R renal collecting system without evidence of obstructing stone and surrounding perinephric stranding." Pt. Was discharged from the ED with PO doxycycline to treat possible pyelonephritis although U/A was LE negative and not sent for culture. he obtained nephrology f/u yesterday, and on repeat labs Cr noted to be 4.7 and K+ 5.2., so he was referred to the ED. He reports some persistent flank pain, but it has improved. He denies any fevers, chills, nausea, or vomiting. No reported changes in urine output, dysuria, or hematuria. On ED arrival, pt. Noted to have  and pt. Reports that his omnipod Dash - Insulin pump must be malfunctioning, although he is not sure when it stopped working. Labs 3 days ago show normal BG.      Overview/Hospital Course:  10/13- admitted for uncontrollled diabetes, acute renal failure, obstructive nephropathy. Recently treated for pyelo w doxycycline. /79   Pulse 97  . Cr 4.9-> 4.5 BL 2.1.  -> 340. Received NS. UO- 300 cc.  One BM this am.  Nurse reports leg hot and red, photos and put in file - exam consistent w stasis dermatitis. AF. Started empiric clindamycin, " this am, and discontinued.  continue NS x one liter.  Urology to place stent in am.   Cxr - mild pulm edema  CT abd - Mild right hydronephrosis and right hydroureter with gradual tapering without evidence for ureteral calculus, these findings may relate to sequelae of recently passed calculus, clinical and historical correlation is needed.  Mild perinephric stranding bilaterally, nonspecific however correlation for UTI/pyelonephritis is needed.  Mild urinary bladder wall thickening may relate to incomplete distention however correlation for UTI/cystitis is needed.  Mild circumferential thickening of the distal descending colon/proximal sigmoid colon with mild pericolonic haziness, correlation for mild colitis to include mild diverticulitis is needed.  Right adrenal nodule likely representing adrenal adenoma.      Interval History: see above    Review of Systems   Constitutional:  Positive for activity change. Negative for appetite change and fever.   HENT:  Negative for trouble swallowing.    Respiratory:  Negative for cough and shortness of breath.    Cardiovascular:  Positive for leg swelling. Negative for chest pain.   Gastrointestinal:  Positive for abdominal distention. Negative for blood in stool, diarrhea, nausea and vomiting.   Genitourinary:  Negative for difficulty urinating.   Musculoskeletal:  Negative for neck stiffness.   Neurological:  Negative for headaches.   Psychiatric/Behavioral:  Negative for agitation, behavioral problems and confusion.      Objective:     Vital Signs (Most Recent):  Temp: 98.7 °F (37.1 °C) (10/13/23 0435)  Pulse: 97 (10/13/23 0435)  Resp: 19 (10/13/23 0435)  BP: (!) 169/79 (10/13/23 0435)  SpO2: 96 % (10/13/23 0435) Vital Signs (24h Range):  Temp:  [98 °F (36.7 °C)-98.7 °F (37.1 °C)] 98.7 °F (37.1 °C)  Pulse:  [76-97] 97  Resp:  [19-23] 19  SpO2:  [96 %-99 %] 96 %  BP: (154-221)/() 169/79     Weight: 126.6 kg (279 lb 1.6 oz)  Body mass index is 43.71 kg/m².  No intake or  output data in the 24 hours ending 10/13/23 0727      Physical Exam  Constitutional:       General: He is not in acute distress.     Appearance: Normal appearance. He is obese. He is not ill-appearing, toxic-appearing or diaphoretic.   HENT:      Head: Normocephalic and atraumatic.      Nose: Nose normal.      Mouth/Throat:      Mouth: Mucous membranes are moist.      Pharynx: Oropharynx is clear.   Eyes:      General: No scleral icterus.     Extraocular Movements: Extraocular movements intact.      Conjunctiva/sclera: Conjunctivae normal.      Pupils: Pupils are equal, round, and reactive to light.   Cardiovascular:      Rate and Rhythm: Normal rate and regular rhythm.      Pulses: Normal pulses.      Heart sounds: Normal heart sounds.   Pulmonary:      Effort: Pulmonary effort is normal. No respiratory distress.      Breath sounds: Normal breath sounds. No stridor. No wheezing, rhonchi or rales.   Chest:      Chest wall: No tenderness.   Abdominal:      General: Abdomen is flat. Bowel sounds are normal. There is no distension.      Palpations: Abdomen is soft.      Tenderness: There is no abdominal tenderness. There is no right CVA tenderness, left CVA tenderness, guarding or rebound.   Musculoskeletal:         General: No swelling, tenderness, deformity or signs of injury. Normal range of motion.      Cervical back: Normal range of motion and neck supple. No rigidity or tenderness.      Right lower leg: Edema present.      Left lower leg: Edema present.   Lymphadenopathy:      Cervical: No cervical adenopathy.   Skin:     General: Skin is warm and dry.      Coloration: Skin is not jaundiced.      Findings: No erythema or rash.   Neurological:      General: No focal deficit present.      Mental Status: He is alert and oriented to person, place, and time. Mental status is at baseline.      Motor: No weakness.   Psychiatric:         Mood and Affect: Mood normal.         Behavior: Behavior normal.         Thought  Content: Thought content normal.         Judgment: Judgment normal.             Significant Labs: All pertinent labs within the past 24 hours have been reviewed.  CBC:   Recent Labs   Lab 10/12/23  1231 10/12/23  1802 10/13/23  0526   WBC 5.82 5.54 5.15   HGB 12.3* 12.2* 11.4*   HCT 36.8* 35.3* 33.8*    166 147*     CMP:   Recent Labs   Lab 10/12/23  1231 10/12/23  1802 10/13/23  0526   * 132* 134*   K 5.2* 5.7* 4.6    104 108   CO2 22* 19* 20*   * 516* 340*   BUN 39* 38* 37*   CREATININE 4.7* 4.7* 4.5*   CALCIUM 8.3* 8.2* 8.0*   PROT 6.5 6.7 5.5*   ALBUMIN 2.9* 2.8* 2.4*   BILITOT 0.4 0.3 0.2   ALKPHOS 160* 156* 131   AST 18 26 24   ALT 16 18 17   ANIONGAP 8 9 6*       Significant Imaging: I have reviewed all pertinent imaging results/findings within the past 24 hours.      Assessment/Plan:      * Acute renal failure superimposed on stage 3 chronic kidney disease  -Cr 4.7, baseline ~2.2. Imaging shows Mild right hydronephrosis and right hydroureter with gradual tapering and FENa 8% consistent with post-obstructive etiology. Urology consult for assistance  -IV fluids, avoid nephrotoxins and continue to trend Cr    10/13- Nephrology and urology were consulted May be due to hypertensive emergency in the setting of chronic kidney disease.  Monitoring UO.- only 300 cc last night. Post void residual = 0. Had a loose stool.  Consider cardiorenal syndrome. Will get ECHO. Urology planning on stent placement in am.        Hypertensive emergency  221/94 upon admit  Hypertensive Emergency present on admission, with ARF  reports not taking any HTN medications. Plan to start scheduled amlodipine, hydralazine ordered PRN. Monitor BG and adjust as needed    10/13- /79 on norvasc. ->  176/83 . Prn hydralazine po is ordered     Type 2 diabetes mellitus with diabetic neuropathy, with long-term current use of insulin  -Pt. Hyperglycemic to >500 on presentation, suspect acute insulin pump malfunction as  BG normal 3 days ago and A1c 7.6  -Levemir 20 units QHS and SSI ordered with diabetic/renal diet. Endocrinology consulted for assistance  -Has insulin pump, which is turned off at this time.    - Hypoglycemic protocol    10/13- BS improving. OR for cystoscopy and stent placement in am.  May attempt to place new pod and restart pump tomorrow.  Recent Labs     10/12/23  2149 10/13/23  0138 10/13/23  0748 10/13/23  1117 10/13/23  1356 10/13/23  1533   POCTGLUCOSE 207* 275* 284* 238* 234* 198*         Chronic combined systolic and diastolic heart failure  Hx of EF 30 %  ECHO 6/19:   Mildly decreased left ventricular systolic function. The estimated ejection fraction is 45-50%   Left ventricular diastolic dysfunction.   Normal right ventricular systolic function.   Normal central venous pressure (3 mm Hg).   Extremely technically challenging study, poor endocardial visualization, would recommend repeating with contrast if additional information is desired.    10/13- dc IVF until echo returns and Nephrology see pt.   ECHO 10/13:   Left Ventricle: The left ventricle is normal in size. Normal wall thickness. Septal motion is consistent with post-operative status. There is low normal systolic function with a visually estimated ejection fraction of 50 - 55%. There is normal diastolic function.    Right Ventricle: Normal right ventricular cavity size. Wall thickness is normal. Right ventricle wall motion  is normal. Systolic function is normal.    Pulmonary Artery: The estimated pulmonary artery systolic pressure is 21 mmHg.    IVC/SVC: Normal venous pressure at 3 mmHg.                    Venous stasis dermatitis of both lower extremities  eam not consistent with cellulitis  Recent course of doxycycline as outpt.   Empiric clindamycin IV.- stopped  IVFs- one liter tonight      Other cirrhosis of liver  -Chronic MARIE, no acute issues, compensated      Insulin pump status  Endocrine consulted  Pump is off.  Will attempt  to place new pod and restart pump 10/14.      History of colon polyps  XT abd reveals thickened colon  F/u GI for colonoscopy      Chronic kidney disease, stage 3  BL cr 2.1      Paroxysmal atrial fibrillation  Hx of  Not on anticoagulation    VTE Risk Mitigation (From admission, onward)         Ordered     heparin (porcine) injection 5,000 Units  Every 8 hours         10/12/23 2227     IP VTE HIGH RISK PATIENT  Once         10/12/23 2227     Place sequential compression device  Until discontinued         10/12/23 2227                Discharge Planning   [place]: 10/16/2023     Code Status: Full Code   Is the patient medically ready for discharge?: No    Reason for patient still in hospital (select all that apply): Patient trending condition  Discharge Plan A: Home      Sharla Duran MD  Department of Hospital Medicine   [facility] - Intensive Care ([address])

## 2023-10-13 NOTE — ED TRIAGE NOTES
Jian Arrieta, a 68 y.o. male presents to the ED w/ complaint of abnormal lab. Reports being told to come to the ED for elevated Cr. Denies all complaints at this time    Adult Physical Assessment  LOC: Jian Arrieta, 68 y.o. male verified via two identifiers.  The patient is awake, alert, oriented and speaking appropriately at this time.  APPEARANCE: Patient resting comfortably and appears to be in no acute distress at this time. Patient is clean and well groomed, patient's clothing is properly fastened.  SKIN:The skin is warm and dry, color consistent with ethnicity, patient has normal skin turgor and moist mucus membranes. Patient has BLE edema  MUSCULOSKELETAL: Patient moving all extremities well.   RESPIRATORY: Airway is open and patent, respirations are spontaneous, patient has a normal effort and rate, no accessory muscle use noted.  CARDIAC: Patient has a normal rate and rhythm, no periphreal edema noted in any extremity, capillary refill < 3 seconds in all extremities  ABDOMEN: Soft and non tender to palpation, no abdominal distention noted. Bowel sounds present in all four quadrants.  NEUROLOGIC: Eyes open spontaneously, behavior appropriate to situation, follows commands, facial expression symmetrical, bilateral hand grasp equal and even, purposeful motor response noted, normal sensation in all extremities when touched with a finger.      Triage note:  Chief Complaint   Patient presents with    Abnormal Lab     Elevated Cr     Review of patient's allergies indicates:  No Known Allergies  Past Medical History:   Diagnosis Date    Alcohol abuse     Anasarca 1/28/2019    Anemia     Anticoagulant long-term use     Arthropathy associated with neurological disorder 9/2/2015    Atherosclerosis     Charcot foot due to diabetes mellitus     Chronic combined systolic and diastolic heart failure 01/29/2019 1-28-19 Left VentricleModerate decreased ejection fraction at 30%. Normal left ventricular cavity size.  Normal wall thickness observed. Grade I (mild) left ventricular diastolic dysfunction consistent with impaired relaxation. Normal left atrial pressure. Moderate, global hypokinesis(see wall scoring diagram). Right VentricleNormal cavity size, wall thickness and ejection fraction. Wall motion n    Chronic pancreatitis 1/28/2019    CKD (chronic kidney disease) stage 3, GFR 30-59 ml/min     CKD (chronic kidney disease) stage 4, GFR 15-29 ml/min     Colon polyps     approx 5 yrs ago    Coronary artery disease due to calcified coronary lesion 05/08/2015    5 stents on ASA      Diabetic polyneuropathy associated with type 2 diabetes mellitus 9/2/2015    Diverticulosis 1/28/2019    DM type 2 with diabetic peripheral neuropathy 2/4/2019    Encounter for blood transfusion     Essential hypertension 1/28/2019    Former smoker 8/26/2015    Healed ulcer of left foot on examination 6/20/2017    Hematuria     Hydrocele     approx 1.5 yrs ago    Hyperphosphatemia     Hypoalbuminemia 2/4/2019    Hypocalcemia     Lymphedema of both lower extremities 1/29/2019    Mixed hyperlipidemia 5/8/2015    Morbid obesity with BMI of 50.0-59.9, adult 5/8/2015    Obstruction of right ureteropelvic junction (UPJ) due to stone 5/24/2021    Onychomycosis of multiple toenails with type 2 diabetes mellitus and peripheral neuropathy 6/20/2017    Perianal cyst     approx 2 yrs ago    Proteinuria     Pseudocyst of pancreas 1/28/2019 1-28-19 Liver has a cirrhotic morphology with no focal lesions.  Significant interval increase in ascites when compared to prior exam which may account for patient's abdominal distension.  Hypodense air-fluid collection along the body of the pancreas which is slightly smaller when compared to prior CT.  Findings may relate to pancreatic necrosis with pancreatic pseudocysts with infected pseudocyst    Skin cancer     skin cancer    Sleep apnea 8/26/2015    Status post bariatric surgery 1/11/2016    Type 2 diabetes mellitus,  with long-term current use of insulin 5/8/2015    Urinary tract infection

## 2023-10-13 NOTE — HPI
"67 yo M with PMHx MARIE (liver bx 3/2019 w/o cirrhosis), hx of sleeve gastrectomy, CAD s/p CABG, and CKD3 w/ hx of obstructive nephrolithiasis who presents to the ED from nephrology clinic for worsening renal function for last 2 weeks. Pt. Baseline Cr ~2.2, but he recently presented to [facility] ED with B/L flank pain. Cr wasmildly elevated at 2.56 and CT renal stone revealed "mild fullness of the proximal R renal collecting system without evidence of obstructing stone and surrounding perinephric stranding." Pt. Was discharged from the ED with PO doxycycline to treat possible pyelonephritis although U/A was LE negative and not sent for culture. he obtained nephrology f/u yesterday, and on repeat labs Cr noted to be 4.7 and K+ 5.2., so he was referred to the ED. He reports some persistent flank pain, but it has improved. He denies any fevers, chills, nausea, or vomiting. No reported changes in urine output, dysuria, or hematuria. On ED arrival, pt. Noted to have  and pt. Reports that his omnipod Dash - Insulin pump must be malfunctioning, although he is not sure when it stopped working. Labs 3 days ago show normal BG.  "

## 2023-10-13 NOTE — CONSULTS
"[facility] ([place] [id number])  Nephrology  Consult Note    Patient Name: Jian Arrieta  MRN: [id number]  Admission Date: 10/12/2023  Hospital Length of Stay: 1 days  Attending Provider: Sharla Duran MD   Primary Care Physician: Leon Barajas DO  Principal Problem:Acute renal failure superimposed on stage 3 chronic kidney disease    Inpatient consult to Nephrology  Consult performed by: Guillaume Sena MD  Consult ordered by: Paxton Kaiser MD        Subjective:     HPI: Jian Arrieta is a 68M w/ MARIE (liver bx 3/2019 w/o cirrhosis), s/p sleeve gastrectomy, CAD s/p CABG, CKD3 (BL creatinine 2.2), and history of obstructive nephrolithiasis who presents from nephrology clinic w/ worsening renal function x 2 weeks. Recent admission to  w/ nephrolithiasis of R kidney. Was discharged from  w/ doxycycline out of concern for pyelonephritis. In clinic, creatinine was noted to be significantly elevated to 4.7 prompting ED referral. On arrival, reported improving but persistent flank pain. Denies any fevers/chills/N/V. Denies changes in urine output, dysuria, or hematuria. Of note, blood glucose 516 on arrival.    Nephrology consulted for "acute on chronic kidney failure."      Past Medical History:   Diagnosis Date    Alcohol abuse     Anasarca 1/28/2019    Anemia     Anticoagulant long-term use     Arthropathy associated with neurological disorder 9/2/2015    Atherosclerosis     Charcot foot due to diabetes mellitus     Chronic combined systolic and diastolic heart failure 01/29/2019 1-28-19 Left VentricleModerate decreased ejection fraction at 30%. Normal left ventricular cavity size. Normal wall thickness observed. Grade I (mild) left ventricular diastolic dysfunction consistent with impaired relaxation. Normal left atrial pressure. Moderate, global hypokinesis(see wall scoring diagram). Right VentricleNormal cavity size, wall thickness and ejection fraction. Wall motion n    Chronic " pancreatitis 1/28/2019    CKD (chronic kidney disease) stage 3, GFR 30-59 ml/min     CKD (chronic kidney disease) stage 4, GFR 15-29 ml/min     Colon polyps     approx 5 yrs ago    Coronary artery disease due to calcified coronary lesion 05/08/2015    5 stents on ASA      Diabetic polyneuropathy associated with type 2 diabetes mellitus 9/2/2015    Diverticulosis 1/28/2019    DM type 2 with diabetic peripheral neuropathy 2/4/2019    Encounter for blood transfusion     Essential hypertension 1/28/2019    Former smoker 8/26/2015    Healed ulcer of left foot on examination 6/20/2017    Hematuria     Hydrocele     approx 1.5 yrs ago    Hyperphosphatemia     Hypoalbuminemia 2/4/2019    Hypocalcemia     Lymphedema of both lower extremities 1/29/2019    Mixed hyperlipidemia 5/8/2015    Morbid obesity with BMI of 50.0-59.9, adult 5/8/2015    Obstruction of right ureteropelvic junction (UPJ) due to stone 5/24/2021    Onychomycosis of multiple toenails with type 2 diabetes mellitus and peripheral neuropathy 6/20/2017    Perianal cyst     approx 2 yrs ago    Proteinuria     Pseudocyst of pancreas 1/28/2019 1-28-19 Liver has a cirrhotic morphology with no focal lesions.  Significant interval increase in ascites when compared to prior exam which may account for patient's abdominal distension.  Hypodense air-fluid collection along the body of the pancreas which is slightly smaller when compared to prior CT.  Findings may relate to pancreatic necrosis with pancreatic pseudocysts with infected pseudocyst    Skin cancer     skin cancer    Sleep apnea 8/26/2015    Status post bariatric surgery 1/11/2016    Type 2 diabetes mellitus, with long-term current use of insulin 5/8/2015    Urinary tract infection        Past Surgical History:   Procedure Laterality Date    ANGIOGRAPHY N/A 6/28/2019    Procedure: ANGIOGRAM-PV STENT;  Surgeon: Ewa Diagnostic Provider;  Location: [facility] OR;  Service: Radiology;  Laterality: N/A;    ANGIOPLASTY       total x5 stents    COLONOSCOPY N/A 10/6/2015    Procedure: COLONOSCOPY;  Surgeon: Shekhar Richards MD;  Location: [facility] (2ND FLR);  Service: Endoscopy;  Laterality: N/A;  BMI over 55/2nd floor case    5 day hold Plavix, Dr Kwadwo Arroyo    COLONOSCOPY N/A 5/13/2021    Procedure: COLONOSCOPY;  Surgeon: Huan Brumfield MD;  Location: [facility];  Service: Endoscopy;  Laterality: N/A;    CORONARY ANGIOGRAPHY Right 3/20/2019    Procedure: ANGIOGRAM, CORONARY ARTERY;  Surgeon: Bob Duque MD;  Location: [facility] CATH LAB;  Service: Cardiology;  Laterality: Right;    CORONARY ARTERY BYPASS GRAFT  2017    x3    CORONARY BYPASS GRAFT ANGIOGRAPHY  3/20/2019    Procedure: Bypass graft study;  Surgeon: Bob Duque MD;  Location: [facility] CATH LAB;  Service: Cardiology;;    CYST REMOVAL      CYSTOSCOPY Right 6/30/2021    Procedure: CYSTOSCOPY;  Surgeon: William Diaz MD;  Location: [facility] OR 1ST FLR;  Service: Urology;  Laterality: Right;    CYSTOSCOPY W/ URETERAL STENT PLACEMENT Right 6/16/2021    Procedure: CYSTOSCOPY, WITH URETERAL STENT INSERTION;  Surgeon: William Diaz MD;  Location: [facility] OR 2ND FLR;  Service: Urology;  Laterality: Right;  FLUORO LESS THAN 1 HOUR    ENDOSCOPIC ULTRASOUND OF UPPER GASTROINTESTINAL TRACT N/A 2/26/2020    Procedure: ULTRASOUND, UPPER GI TRACT, ENDOSCOPIC;  Surgeon: Robbie Yang MD;  Location: [facility];  Service: Endoscopy;  Laterality: N/A;    ESOPHAGOGASTRODUODENOSCOPY N/A 7/8/2019    Procedure: ESOPHAGOGASTRODUODENOSCOPY (EGD);  Surgeon: Huan Brumfield MD;  Location: [facility];  Service: Endoscopy;  Laterality: N/A;    ESOPHAGOGASTRODUODENOSCOPY N/A 5/13/2021    Procedure: EGD (ESOPHAGOGASTRODUODENOSCOPY);  Surgeon: Huan Brumfield MD;  Location: [facility];  Service: Endoscopy;  Laterality: N/A;    EXCISION OF HYDROCELE Bilateral 6/16/2021    Procedure: HYDROCELE REPAIR;  Surgeon: William Diaz MD;  Location: [facility] OR 2ND FLR;  Service: Urology;  Laterality: Bilateral;   2 HOURS    FLUOROSCOPY N/A 6/16/2021    Procedure: FLUOROSCOPY;  Surgeon: William Diaz MD;  Location: [facility] OR 2ND FLR;  Service: Urology;  Laterality: N/A;    GASTRECTOMY      INSERTION OF DIALYSIS CATHETER N/A 5/17/2019    Procedure: pleurx;  Surgeon: Ewa Diagnostic Provider;  Location: [facility] OR 2ND FLR;  Service: General;  Laterality: N/A;  Room 188/[place]    KNEE ARTHROSCOPY      LAPAROSCOPIC CHOLECYSTECTOMY N/A 5/4/2020    Procedure: CHOLECYSTECTOMY, LAPAROSCOPIC;  Surgeon: SON Rowe MD;  Location: [facility] OR;  Service: General;  Laterality: N/A;    LASER LITHOTRIPSY N/A 6/30/2021    Procedure: LITHOTRIPSY, USING LASER;  Surgeon: William Diaz MD;  Location: [facility] OR 1ST FLR;  Service: Urology;  Laterality: N/A;    LIVER BIOPSY N/A 5/4/2020    Procedure: BIOPSY, LIVER, Laproscopic ;  Surgeon: SON Rowe MD;  Location: [facility] OR;  Service: General;  Laterality: N/A;    perianal surgery      perianal cyst removed    PYELOSCOPY Right 6/30/2021    Procedure: PYELOSCOPY;  Surgeon: William Diaz MD;  Location: [facility] OR 1ST FLR;  Service: Urology;  Laterality: Right;    REPLACEMENT OF STENT Right 6/30/2021    Procedure: REPLACEMENT, STENT;  Surgeon: William Diaz MD;  Location: [facility] OR 1ST FLR;  Service: Urology;  Laterality: Right;    URETEROSCOPY Right 6/30/2021    Procedure: URETEROSCOPY FLEXIBLE URETEROSCOPE;  Surgeon: William Diaz MD;  Location: [facility] OR 1ST FLR;  Service: Urology;  Laterality: Right;       Review of patient's allergies indicates:  No Known Allergies  Current Facility-Administered Medications   Medication Frequency    acetaminophen tablet 650 mg Q4H PRN    amLODIPine tablet 10 mg Daily    clindamycin in D5W 600 mg/50 mL IVPB 600 mg Q6H    dextrose 10% bolus 125 mL 125 mL PRN    dextrose 10% bolus 250 mL 250 mL PRN    glucagon (human recombinant) injection 1 mg PRN    glucose chewable tablet 16 g PRN    glucose chewable tablet 24 g PRN    heparin (porcine) injection 5,000 Units  Q8H    hydrALAZINE tablet 50 mg Q8H PRN    insulin aspart U-100 pen 0-10 Units Q6H PRN    insulin detemir U-100 (Levemir) pen 20 Units QHS    melatonin tablet 6 mg Nightly PRN    naloxone 0.4 mg/mL injection 0.02 mg PRN    ondansetron injection 4 mg Q8H PRN    prochlorperazine injection Soln 5 mg Q6H PRN    sodium chloride 0.9% flush 10 mL PRN    tamsulosin 24 hr capsule 0.4 mg QHS     Family History       Problem Relation (Age of Onset)    Cancer Mother, Father, Paternal Grandfather, Brother    Diabetes Maternal Grandmother    Heart disease Father    No Known Problems Paternal Grandmother    Obesity Sister    Parkinsonism Brother    Stroke Maternal Grandfather          Tobacco Use    Smoking status: Former     Current packs/day: 0.00     Average packs/day: 2.0 packs/day for 30.0 years (60.0 ttl pk-yrs)     Types: Cigarettes     Start date: 1975     Quit date: 2005     Years since quittin.7    Smokeless tobacco: Never   Substance and Sexual Activity    Alcohol use: No     Comment: started ~, reports 1 shot daily, max 3 shots daily, vague about alcohol consumption. Last drink 2018    Drug use: No    Sexual activity: Not on file     Review of Systems   Constitutional:  Positive for activity change. Negative for appetite change and fever.   HENT:  Negative for trouble swallowing.    Respiratory:  Negative for cough and shortness of breath.    Cardiovascular:  Positive for leg swelling. Negative for chest pain.   Gastrointestinal:  Positive for abdominal distention. Negative for blood in stool, diarrhea, nausea and vomiting.   Genitourinary:  Positive for flank pain. Negative for difficulty urinating.   Musculoskeletal:  Negative for neck stiffness.   Neurological:  Negative for headaches.   Psychiatric/Behavioral:  Negative for agitation, behavioral problems and confusion.      Objective:     Vital Signs (Most Recent):  Temp: 98 °F (36.7 °C) (10/13/23 1118)  Pulse: 72 (10/13/23 1222)  Resp: 18  (10/13/23 1118)  BP: 122/65 (10/13/23 1118)  SpO2: 98 % (10/13/23 1118) Vital Signs (24h Range):  Temp:  [98 °F (36.7 °C)-98.7 °F (37.1 °C)] 98 °F (36.7 °C)  Pulse:  [72-97] 72  Resp:  [18-23] 18  SpO2:  [96 %-99 %] 98 %  BP: (122-221)/() 122/65     Weight: 126.6 kg (279 lb) (10/13/23 1025)  Body mass index is 43.7 kg/m².  Body surface area is 2.45 meters squared.    I/O last 3 completed shifts:  In: -   Out: 300 [Urine:300]     Physical Exam  Vitals and nursing note reviewed.   Constitutional:       General: He is not in acute distress.     Appearance: He is not ill-appearing, toxic-appearing or diaphoretic.   HENT:      Head: Normocephalic and atraumatic.      Right Ear: External ear normal.      Left Ear: External ear normal.      Nose: Nose normal.      Mouth/Throat:      Mouth: Mucous membranes are moist.      Pharynx: Oropharynx is clear.   Eyes:      General: No scleral icterus.     Extraocular Movements: Extraocular movements intact.      Conjunctiva/sclera: Conjunctivae normal.      Pupils: Pupils are equal, round, and reactive to light.   Cardiovascular:      Rate and Rhythm: Normal rate and regular rhythm.      Pulses: Normal pulses.      Heart sounds: Normal heart sounds.   Pulmonary:      Effort: Pulmonary effort is normal. No respiratory distress.   Abdominal:      General: There is no distension.      Tenderness: There is no abdominal tenderness. There is no right CVA tenderness, left CVA tenderness, guarding or rebound.   Musculoskeletal:         General: No swelling or deformity. Normal range of motion.      Cervical back: Normal range of motion.      Right lower leg: Edema present.      Left lower leg: Edema present.   Skin:     General: Skin is warm and dry.      Coloration: Skin is not jaundiced.   Neurological:      General: No focal deficit present.      Mental Status: He is alert and oriented to person, place, and time. Mental status is at baseline.      Motor: No weakness.         "  Significant Labs:  CBC:   Recent Labs   Lab 10/13/23  0526   WBC 5.15   RBC 3.78*   HGB 11.4*   HCT 33.8*   *   MCV 89   MCH 30.2   MCHC 33.7     CMP:   Recent Labs   Lab 10/13/23  0526   *   CALCIUM 8.0*   ALBUMIN 2.4*   PROT 5.5*   *   K 4.6   CO2 20*      BUN 37*   CREATININE 4.5*   ALKPHOS 131   ALT 17   AST 24   BILITOT 0.2     Recent Labs   Lab 10/12/23  1238   COLORU Yellow   SPECGRAV 1.025   PHUR 6.0   PROTEINUA 3+*   BACTERIA Rare   NITRITE Negative   LEUKOCYTESUR Negative   HYALINECASTS 0     All labs within the past 24 hours have been reviewed.    Significant Imaging:  CT: Reviewed  Echo: Reviewed  CT with bilateral perinephric stranding, R hydronephrosis. Echo w/ normal EF 50-55%, normal diastolic function, CVP 3.    Assessment/Plan:     Renal/  * Acute renal failure superimposed on stage 3 chronic kidney disease  Creatinine 4.7 on admit, baseline around 2.2    Jian KENROY Arrieta is a 68M w/ MARIE (liver bx 3/2019 w/o cirrhosis), s/p sleeve gastrectomy, CAD s/p CABG, CKD3 (BL creatinine 2.2), and history of obstructive nephrolithiasis who presents from nephrology clinic w/ worsening renal function x 2 weeks. Recent admission to  w/ nephrolithiasis of R kidney. Was discharged from  w/ doxycycline out of concern for pyelonephritis. In clinic, creatinine was noted to be significantly elevated to 4.7 prompting ED referral. On arrival, reported improving but persistent flank pain. Denies any subjective fevers/chills/N/V. Denies changes in urine output, dysuria, or hematuria. Of note, blood glucose 516 on arrival. Nephrology consulted for "acute on chronic kidney failure."    Lab Results   Component Value Date    CREATININE 4.5 (H) 10/13/2023     Estimated Creatinine Clearance: 19.8 mL/min (A) (based on SCr of 4.5 mg/dL (H)).    CT A/P:   "- Mild right hydronephrosis and right hydroureter with gradual tapering without evidence for ureteral calculus, these findings may relate to " "sequelae of recently passed calculus, clinical and historical correlation is needed.   - Mild perinephric stranding bilaterally, nonspecific however correlation for UTI/pyelonephritis is needed."    TTE with EF 50-55%, normal diastolic function, no evidence of pHTN, CVP 3.    UA: 3+ protein, 4+ glucose, nitrite (-), RBC 15, WBC 11, rare bacteria  Urine chloride 80  Urine creatinine 36.0  Urine microalbumin >5k  Urine protein 781  UPCR 4.29  Urine Na 81    Etiology of Acute component superimposed on CKD3: Suspect etiology is multifactorial including ongoing bilateral pyelonephritis and hypertensive emergency in the setting of chronic kidney disease.    Plan:  - Trend RFP daily  - F/u retroperitoneal ultrasound  - Strict I&Os and daily weights   - considered gentle IVF but c/f possible volume overload   - Avoid nephrotoxic agents such as NSAIDs, gadolinium and IV radiocontrast  - Renally dose meds to current GFR  - Maintain MAP > 65  - f/u renin, aldosterone, aldosterone/renin ratio.            Thank you for your consult. Nephrology will follow-up with patient. Please contact us if you have any additional questions.      Guillaume Sena MD  Nephrology  [facility] ([place] [address])    ATTENDING PHYSICIAN ATTESTATION  I have personally verified the history and examined the patient. I thoroughly reviewed the demographic, clinical, laboratorial and imaging information available in medical records. I agree with the assessment and recommendations provided by the subspecialty resident who was under my supervision.    "

## 2023-10-13 NOTE — PROVIDER PROGRESS NOTES - EMERGENCY DEPT.
Imaging Results               CT Abdomen Pelvis  Without Contrast (Final result)  Result time 10/12/23 22:08:05      Final result by Rakan García MD (10/12/23 22:08:05)                   Impression:      Mild right hydronephrosis and right hydroureter with gradual tapering without evidence for ureteral calculus, these findings may relate to sequelae of recently passed calculus, clinical and historical correlation is needed.    Mild perinephric stranding bilaterally, nonspecific however correlation for UTI/pyelonephritis is needed.    Mild urinary bladder wall thickening may relate to incomplete distention however correlation for UTI/cystitis is needed.    Mild circumferential thickening of the distal descending colon/proximal sigmoid colon with mild pericolonic haziness, correlation for mild colitis to include mild diverticulitis is needed.    Right adrenal nodule likely representing adrenal adenoma.    Additional findings as above.    This report was flagged in Epic as abnormal.      Electronically signed by: Rakan García  Date:    10/12/2023  Time:    22:08               Narrative:    EXAMINATION:  CT ABDOMEN PELVIS WITHOUT CONTRAST    CLINICAL HISTORY:  Flank pain, kidney stone suspected;    TECHNIQUE:  Low dose axial images, sagittal and coronal reformations were obtained from the lung bases to the pubic symphysis.  Intravenous contrast and oral contrast was not utilized.    COMPARISON:  CT examination of the abdomen and pelvis without contrast June 27, 2022    FINDINGS:  The lung bases demonstrate mild motion artifact and atelectatic change.  A postoperative change of the stomach is noted.  The stomach is predominantly decompressed.  The gallbladder appears surgically absent.  Density of the right lobe of the liver likely relates to postprocedural coiling.  There is associated artifact.  When accounting for limitations of the examination the appearance of the liver, pancreas, spleen and adrenal glands  is stable without evidence for acute process.  Right adrenal nodule again noted, measuring approximately 7.5 Hounsfield units, likely adrenal adenoma.    Nonobstructing calcification at the midpole of the left kidney is again noted.  There is mild perinephric stranding bilaterally, this is mildly more prominent than the prior study and is nonspecific, correlation for renal disease to include UTI/pyelonephritis is needed.  On the left there is no evidence for ureteral calculus or obstructive uropathy.  On the right there is mild right hydronephrosis and mild right hydroureter with tapering of the right ureter, there is no ureteral calculus seen.  These findings may relate to sequelae of recently passed calculus, clinical and historical correlation is needed.  Mild urinary bladder wall thickening may relate to incomplete distention however correlation for UTI/cystitis is needed.    The arterial vascular structures including the aorta demonstrate atherosclerotic change.  The aorta appears normal in caliber otherwise not optimally evaluated on this noncontrast examination.  Small periaortic retroperitoneal lymph nodes are noted, not enlarged by size criteria.  The prostate is prominent with mild calcifications/mineralization noted.    There are small umbilical and periumbilical fat density hernias without bowel involvement.    There is no evidence for small bowel obstructive process.  The appendix is identified, it does not appear inflamed.  There is mild air and stool noted throughout the colon and there are diverticula of the colon.  There is a segment of the left colon at the junction of the distal descending colon in the proximal sigmoid colon as seen on axial images 115 through 134 that demonstrates appearance of mild circumferential thickening with mild pericolonic haziness.  This may relate to mild colitis to include mild diverticulitis for which clinical correlation is needed.  There is no evidence for free  intraperitoneal air, there is no colonic obstructive change there is no abscess collection.    The osseous structures demonstrate chronic change.  There is diminished mineralization and degenerative change noted.                                       X-Ray Chest AP Portable (Final result)  Result time 10/12/23 18:35:56      Final result by Gonsalo Bland MD (10/12/23 18:35:56)                   Impression:      1. Pulmonary findings suggest edema, correlation and follow-up advised.      Electronically signed by: Gonsalo Bland MD  Date:    10/12/2023  Time:    18:35               Narrative:    EXAMINATION:  XR CHEST AP PORTABLE    CLINICAL HISTORY:  Chest Pain;    TECHNIQUE:  Single frontal view of the chest was performed.    COMPARISON:  05/01/2020    FINDINGS:  The cardiomediastinal silhouette is prominent, magnified by technique noting calcification of the aorta..  There is no pleural effusion.  The trachea is midline.  The lungs are symmetrically expanded bilaterally with coarse interstitial attenuation bilaterally, primarily in a perihilar distribution..  No large focal consolidation seen.  There is no pneumothorax.  The osseous structures are remarkable for degenerative change..                                     CT negative for stone. There is mild hydro on the right side, questionable recently passed stone, though no residual kidney stones in the ureters.  Patient admitted to Hospital Medicine.

## 2023-10-13 NOTE — SUBJECTIVE & OBJECTIVE
Past Medical History:   Diagnosis Date    Alcohol abuse     Anasarca 1/28/2019    Anemia     Anticoagulant long-term use     Arthropathy associated with neurological disorder 9/2/2015    Atherosclerosis     Charcot foot due to diabetes mellitus     Chronic combined systolic and diastolic heart failure 01/29/2019 1-28-19 Left VentricleModerate decreased ejection fraction at 30%. Normal left ventricular cavity size. Normal wall thickness observed. Grade I (mild) left ventricular diastolic dysfunction consistent with impaired relaxation. Normal left atrial pressure. Moderate, global hypokinesis(see wall scoring diagram). Right VentricleNormal cavity size, wall thickness and ejection fraction. Wall motion n    Chronic pancreatitis 1/28/2019    CKD (chronic kidney disease) stage 3, GFR 30-59 ml/min     CKD (chronic kidney disease) stage 4, GFR 15-29 ml/min     Colon polyps     approx 5 yrs ago    Coronary artery disease due to calcified coronary lesion 05/08/2015    5 stents on ASA      Diabetic polyneuropathy associated with type 2 diabetes mellitus 9/2/2015    Diverticulosis 1/28/2019    DM type 2 with diabetic peripheral neuropathy 2/4/2019    Encounter for blood transfusion     Essential hypertension 1/28/2019    Former smoker 8/26/2015    Healed ulcer of left foot on examination 6/20/2017    Hematuria     Hydrocele     approx 1.5 yrs ago    Hyperphosphatemia     Hypoalbuminemia 2/4/2019    Hypocalcemia     Lymphedema of both lower extremities 1/29/2019    Mixed hyperlipidemia 5/8/2015    Morbid obesity with BMI of 50.0-59.9, adult 5/8/2015    Obstruction of right ureteropelvic junction (UPJ) due to stone 5/24/2021    Onychomycosis of multiple toenails with type 2 diabetes mellitus and peripheral neuropathy 6/20/2017    Perianal cyst     approx 2 yrs ago    Proteinuria     Pseudocyst of pancreas 1/28/2019 1-28-19 Liver has a cirrhotic morphology with no focal lesions.  Significant interval increase in ascites  when compared to prior exam which may account for patient's abdominal distension.  Hypodense air-fluid collection along the body of the pancreas which is slightly smaller when compared to prior CT.  Findings may relate to pancreatic necrosis with pancreatic pseudocysts with infected pseudocyst    Skin cancer     skin cancer    Sleep apnea 8/26/2015    Status post bariatric surgery 1/11/2016    Type 2 diabetes mellitus, with long-term current use of insulin 5/8/2015    Urinary tract infection        Past Surgical History:   Procedure Laterality Date    ANGIOGRAPHY N/A 6/28/2019    Procedure: ANGIOGRAM-PV STENT;  Surgeon: Ewa Diagnostic Provider;  Location: [facility] OR;  Service: Radiology;  Laterality: N/A;    ANGIOPLASTY      total x5 stents    COLONOSCOPY N/A 10/6/2015    Procedure: COLONOSCOPY;  Surgeon: Shekhar Richards MD;  Location: [facility] (2ND FLR);  Service: Endoscopy;  Laterality: N/A;  BMI over 55/2nd floor case    5 day hold Plavix, Dr Kwadwo Arroyo    COLONOSCOPY N/A 5/13/2021    Procedure: COLONOSCOPY;  Surgeon: Huan Brumfield MD;  Location: [facility] ENDO;  Service: Endoscopy;  Laterality: N/A;    CORONARY ANGIOGRAPHY Right 3/20/2019    Procedure: ANGIOGRAM, CORONARY ARTERY;  Surgeon: Bob Duque MD;  Location: [facility] CATH LAB;  Service: Cardiology;  Laterality: Right;    CORONARY ARTERY BYPASS GRAFT  2017    x3    CORONARY BYPASS GRAFT ANGIOGRAPHY  3/20/2019    Procedure: Bypass graft study;  Surgeon: Bob Duque MD;  Location: [facility] CATH LAB;  Service: Cardiology;;    CYST REMOVAL      CYSTOSCOPY Right 6/30/2021    Procedure: CYSTOSCOPY;  Surgeon: William Diaz MD;  Location: [facility] 1ST FLR;  Service: Urology;  Laterality: Right;    CYSTOSCOPY W/ URETERAL STENT PLACEMENT Right 6/16/2021    Procedure: CYSTOSCOPY, WITH URETERAL STENT INSERTION;  Surgeon: William Diaz MD;  Location: [facility] 2ND FLR;  Service: Urology;  Laterality: Right;  FLUORO LESS THAN 1 HOUR    ENDOSCOPIC ULTRASOUND OF  UPPER GASTROINTESTINAL TRACT N/A 2/26/2020    Procedure: ULTRASOUND, UPPER GI TRACT, ENDOSCOPIC;  Surgeon: Robbie Yang MD;  Location: [facility] ENDO;  Service: Endoscopy;  Laterality: N/A;    ESOPHAGOGASTRODUODENOSCOPY N/A 7/8/2019    Procedure: ESOPHAGOGASTRODUODENOSCOPY (EGD);  Surgeon: Huan Brumfield MD;  Location: [facility] ENDO;  Service: Endoscopy;  Laterality: N/A;    ESOPHAGOGASTRODUODENOSCOPY N/A 5/13/2021    Procedure: EGD (ESOPHAGOGASTRODUODENOSCOPY);  Surgeon: Huan Brumfield MD;  Location: [facility] ENDO;  Service: Endoscopy;  Laterality: N/A;    EXCISION OF HYDROCELE Bilateral 6/16/2021    Procedure: HYDROCELE REPAIR;  Surgeon: William Diaz MD;  Location: [facility] OR 2ND FLR;  Service: Urology;  Laterality: Bilateral;  2 HOURS    FLUOROSCOPY N/A 6/16/2021    Procedure: FLUOROSCOPY;  Surgeon: William Diaz MD;  Location: [facility] OR 2ND FLR;  Service: Urology;  Laterality: N/A;    GASTRECTOMY      INSERTION OF DIALYSIS CATHETER N/A 5/17/2019    Procedure: pleurx;  Surgeon: [facility] Diagnostic Provider;  Location: [facility] OR 2ND FLR;  Service: General;  Laterality: N/A;  Room 188/[facility]    KNEE ARTHROSCOPY      LAPAROSCOPIC CHOLECYSTECTOMY N/A 5/4/2020    Procedure: CHOLECYSTECTOMY, LAPAROSCOPIC;  Surgeon: SON Rowe MD;  Location: [facility] OR;  Service: General;  Laterality: N/A;    LASER LITHOTRIPSY N/A 6/30/2021    Procedure: LITHOTRIPSY, USING LASER;  Surgeon: William Diaz MD;  Location: [facility] OR 1ST FLR;  Service: Urology;  Laterality: N/A;    LIVER BIOPSY N/A 5/4/2020    Procedure: BIOPSY, LIVER, Laproscopic ;  Surgeon: SON Rowe MD;  Location: [facility] OR;  Service: General;  Laterality: N/A;    perianal surgery      perianal cyst removed    PYELOSCOPY Right 6/30/2021    Procedure: PYELOSCOPY;  Surgeon: William Diaz MD;  Location: [facility] OR 1ST FLR;  Service: Urology;  Laterality: Right;    REPLACEMENT OF STENT Right 6/30/2021    Procedure: REPLACEMENT, STENT;  Surgeon: William Diaz MD;  Location:  [facility] OR 1ST FLR;  Service: Urology;  Laterality: Right;    URETEROSCOPY Right 2021    Procedure: URETEROSCOPY FLEXIBLE URETEROSCOPE;  Surgeon: William Diaz MD;  [facility];  Service: Urology;  Laterality: Right;       Review of patient's allergies indicates:  No Known Allergies    Family History       Problem Relation (Age of Onset)    Cancer Mother, Father, Paternal Grandfather, Brother    Diabetes Maternal Grandmother    Heart disease Father    No Known Problems Paternal Grandmother    Obesity Sister    Parkinsonism Brother    Stroke Maternal Grandfather            Tobacco Use    Smoking status: Former     Current packs/day: 0.00     Average packs/day: 2.0 packs/day for 30.0 years (60.0 ttl pk-yrs)     Types: Cigarettes     Start date: 1975     Quit date: 2005     Years since quittin.7    Smokeless tobacco: Never   Substance and Sexual Activity    Alcohol use: No     Comment: started ~, reports 1 shot daily, max 3 shots daily, vague about alcohol consumption. Last drink 2018    Drug use: No    Sexual activity: Not on file       Review of Systems   Constitutional:  Negative for chills and fever.   Gastrointestinal:  Negative for nausea and vomiting.   Genitourinary:  Negative for decreased urine volume, difficulty urinating, flank pain and hematuria.       Objective:     Temp:  [98 °F (36.7 °C)-98.7 °F (37.1 °C)] 98.7 °F (37.1 °C)  Pulse:  [76-97] 97  Resp:  [19-23] 19  SpO2:  [96 %-99 %] 96 %  BP: (154-221)/() 169/79  Weight: 126.6 kg (279 lb 1.6 oz)  Body mass index is 43.71 kg/m².           Drains       Drain  Duration                  Ureteral Drain/Stent 21 0912 Right ureter 24 Fr. 848 days                     Physical Exam  Constitutional:       General: He is not in acute distress.  HENT:      Head: Normocephalic.   Eyes:      Extraocular Movements: Extraocular movements intact.   Cardiovascular:      Rate and Rhythm: Normal rate.   Pulmonary:      Effort:  Pulmonary effort is normal. No respiratory distress.   Abdominal:      Palpations: Abdomen is soft.      Tenderness: There is no right CVA tenderness or left CVA tenderness.   Musculoskeletal:         General: No swelling or deformity.      Cervical back: Normal range of motion.   Skin:     General: Skin is warm and dry.   Neurological:      General: No focal deficit present.      Mental Status: He is alert.   Psychiatric:         Mood and Affect: Mood normal.         Behavior: Behavior normal.          Significant Labs:    BMP:  Recent Labs   Lab 10/12/23  1231 10/12/23  1802 10/13/23  0526   * 132* 134*   K 5.2* 5.7* 4.6    104 108   CO2 22* 19* 20*   BUN 39* 38* 37*   CREATININE 4.7* 4.7* 4.5*   CALCIUM 8.3* 8.2* 8.0*       CBC:  Recent Labs   Lab 10/12/23  1231 10/12/23  1802 10/13/23  0526   WBC 5.82 5.54 5.15   HGB 12.3* 12.2* 11.4*   HCT 36.8* 35.3* 33.8*    166 147*       All pertinent labs results from the past 24 hours have been reviewed.    Significant Imaging:  All pertinent imaging results/findings from the past 24 hours have been reviewed.

## 2023-10-13 NOTE — HOSPITAL COURSE
10/13- admitted for uncontrollled diabetes, acute renal failure, obstructive nephropathy. Recently treated for pyelo w doxycycline. /79   Pulse 97  . Cr 4.9-> 4.5 BL 2.1.  -> 340. Received NS. UO- 300 cc.  One BM this am.  Nurse reports leg hot and red, photos and put in file - exam consistent w stasis dermatitis. AF. Started empiric clindamycin, this am, and discontinued.  continue NS x one liter.  Urology to place stent in am.   Cxr - mild pulm edema  CT abd - Mild right hydronephrosis and right hydroureter with gradual tapering without evidence for ureteral calculus, these findings may relate to sequelae of recently passed calculus, clinical and historical correlation is needed.  Mild perinephric stranding bilaterally, nonspecific however correlation for UTI/pyelonephritis is needed.  Mild urinary bladder wall thickening may relate to incomplete distention however correlation for UTI/cystitis is needed.  Mild circumferential thickening of the distal descending colon/proximal sigmoid colon with mild pericolonic haziness, correlation for mild colitis to include mild diverticulitis is needed.  Right adrenal nodule likely representing adrenal adenoma.  10/14- appreciate Urology f/u - no need for stent because renal function improving. Renal US showing mild right hydronephrosis. Cr 4.5-> 3.7  baseline around 2.2. Continuing IVFs.  follow up with Urology- Dr. Diaz outpatient for MAG3. Resume diabetic diet. Endocrine following.  BS 92 this am.    10/15- Cr 3.8 from 3.9 from 4.5, baseline around 2.2.  NPO at midnight for possible stent tomorrow. Appreciate Urology f/u.   10/16-  lab cr 3.5.  NPO.   10/17- stent placed successfully yesterday. Eating. Cr 3.1 continues to improve. Will dc to home today. F/u Urology and int med.

## 2023-10-13 NOTE — PLAN OF CARE
"Nephrology         Vitals:    10/12/23 1541 10/12/23 1846 10/12/23 1932 10/12/23 1934   BP: (!) 219/105 (!) 221/94 (S) (!) 214/102 (!) 214/102   BP Location:   Left arm    Patient Position:   Lying    Pulse: 91 77  76   Resp: 20   (!) 23   Temp: 98.1 °F (36.7 °C)      TempSrc: Oral      SpO2: 98% 98%  99%   Weight: 127 kg (280 lb)      Height: 5' 7" (1.702 m)          Recent Labs   Lab 10/09/23  0911 10/12/23  1231 10/12/23  1802    135* 132*   K 4.7 5.2* 5.7*    105 104   CO2 23 22* 19*   BUN 35* 39* 38*   CREATININE 4.0* 4.7* 4.7*   CALCIUM 8.1* 8.3* 8.2*     -ELIEZER on CKD4  Scr increase from 4 to 4.7 (baseline ~2)    plan  K 5.7 please correct k levels  Obtain urine chloride and urine sodium level  He is making urine, No uremic symptoms there is no acute indication of Dialysis, he may need in future will continue to monitor renal function.    -No other related issues identified. Please call Nephrology as needed; We will continue to follow.      Heladio Russo MD  Nephrology Fellow   "

## 2023-10-13 NOTE — H&P
"[facility] - [facility] ([address] Medicine  History & Physical    Patient Name: Jian Arrieta  MRN: [id number]  Patient Class: IP- Inpatient  Admission Date: 10/12/2023  Attending Physician: Reza Wilson MD   Primary Care Provider: Leon Barajas DO         Patient information was obtained from patient, past medical records and ER records.     Subjective:     Principal Problem:Acute renal failure superimposed on stage 3 chronic kidney disease    Chief Complaint:   Chief Complaint   Patient presents with    Abnormal Lab     Elevated Cr        HPI: 69 yo M with PMHx MARIE (liver bx 3/2019 w/o cirrhosis), hx of sleeve gastrectomy, CAD s/p CABG, and CKD3 w/ hx of obstructive nephrolithiasis who presents to the ED from nephrology clinic for worsening renal function for last 2 weeks. Pt. Baseline Cr ~2.2, but he recently presented to [facility] ED with B/L flank pain. Cr wasmildly elevated at 2.56 and CT renal stone revealed "mild fullness of the proximal R renal collecting system without evidence of obstructing stone and surrounding perinephric stranding." Pt. Was discharged from the ED with PO doxycycline to treat possible pyelonephritis although U/A was LE negative and not sent for culture. he obtained nephrology f/u yesterday, and on repeat labs Cr noted to be 4.7 and K+ 5.2., so he was referred to the ED. He reports some persistent flank pain, but it has improved. He denies any fevers, chills, nausea, or vomiting. No reported changes in urine output, dysuria, or hematuria. On ED arrival, pt. Noted to have  and pt. Reports that his omnipod Dash - Insulin pump must be malfunctioning, although he is not sure when it stopped working. Labs 3 days ago show normal BG.      Past Medical History:   Diagnosis Date    Alcohol abuse     Anasarca 1/28/2019    Anemia     Anticoagulant long-term use     Arthropathy associated with neurological disorder 9/2/2015    Atherosclerosis     Charcot foot " due to diabetes mellitus     Chronic combined systolic and diastolic heart failure 01/29/2019 1-28-19 Left VentricleModerate decreased ejection fraction at 30%. Normal left ventricular cavity size. Normal wall thickness observed. Grade I (mild) left ventricular diastolic dysfunction consistent with impaired relaxation. Normal left atrial pressure. Moderate, global hypokinesis(see wall scoring diagram). Right VentricleNormal cavity size, wall thickness and ejection fraction. Wall motion n    Chronic pancreatitis 1/28/2019    CKD (chronic kidney disease) stage 3, GFR 30-59 ml/min     CKD (chronic kidney disease) stage 4, GFR 15-29 ml/min     Colon polyps     approx 5 yrs ago    Coronary artery disease due to calcified coronary lesion 05/08/2015    5 stents on ASA      Diabetic polyneuropathy associated with type 2 diabetes mellitus 9/2/2015    Diverticulosis 1/28/2019    DM type 2 with diabetic peripheral neuropathy 2/4/2019    Encounter for blood transfusion     Essential hypertension 1/28/2019    Former smoker 8/26/2015    Healed ulcer of left foot on examination 6/20/2017    Hematuria     Hydrocele     approx 1.5 yrs ago    Hyperphosphatemia     Hypoalbuminemia 2/4/2019    Hypocalcemia     Lymphedema of both lower extremities 1/29/2019    Mixed hyperlipidemia 5/8/2015    Morbid obesity with BMI of 50.0-59.9, adult 5/8/2015    Obstruction of right ureteropelvic junction (UPJ) due to stone 5/24/2021    Onychomycosis of multiple toenails with type 2 diabetes mellitus and peripheral neuropathy 6/20/2017    Perianal cyst     approx 2 yrs ago    Proteinuria     Pseudocyst of pancreas 1/28/2019 1-28-19 Liver has a cirrhotic morphology with no focal lesions.  Significant interval increase in ascites when compared to prior exam which may account for patient's abdominal distension.  Hypodense air-fluid collection along the body of the pancreas which is slightly smaller when compared to prior  CT.  Findings may relate to pancreatic necrosis with pancreatic pseudocysts with infected pseudocyst    Skin cancer     skin cancer    Sleep apnea 8/26/2015    Status post bariatric surgery 1/11/2016    Type 2 diabetes mellitus, with long-term current use of insulin 5/8/2015    Urinary tract infection        Past Surgical History:   Procedure Laterality Date    ANGIOGRAPHY N/A 6/28/2019    Procedure: ANGIOGRAM-PV STENT;  Surgeon: Ewa Diagnostic Provider;  Location: [facility] OR;  Service: Radiology;  Laterality: N/A;    ANGIOPLASTY      total x5 stents    COLONOSCOPY N/A 10/6/2015    Procedure: COLONOSCOPY;  Surgeon: Shekhar Richards MD;  Location: [facility] (2ND FLR);  Service: Endoscopy;  Laterality: N/A;  BMI over 55/2nd floor case    5 day hold Plavix, Dr Kwadwo Arroyo    COLONOSCOPY N/A 5/13/2021    Procedure: COLONOSCOPY;  Surgeon: Huan Brumfield MD;  Location: [facility];  Service: Endoscopy;  Laterality: N/A;    CORONARY ANGIOGRAPHY Right 3/20/2019    Procedure: ANGIOGRAM, CORONARY ARTERY;  Surgeon: Bob Duque MD;  Location: [facility] CATH LAB;  Service: Cardiology;  Laterality: Right;    CORONARY ARTERY BYPASS GRAFT  2017    x3    CORONARY BYPASS GRAFT ANGIOGRAPHY  3/20/2019    Procedure: Bypass graft study;  Surgeon: Bob Duque MD;  Location: [facility] CATH LAB;  Service: Cardiology;;    CYST REMOVAL      CYSTOSCOPY Right 6/30/2021    Procedure: CYSTOSCOPY;  Surgeon: William Diaz MD;  Location: [facility] 1ST FLR;  Service: Urology;  Laterality: Right;    CYSTOSCOPY W/ URETERAL STENT PLACEMENT Right 6/16/2021    Procedure: CYSTOSCOPY, WITH URETERAL STENT INSERTION;  Surgeon: William Diaz MD;  Location: [facility] 2ND FLR;  Service: Urology;  Laterality: Right;  FLUORO LESS THAN 1 HOUR    ENDOSCOPIC ULTRASOUND OF UPPER GASTROINTESTINAL TRACT N/A 2/26/2020    Procedure: ULTRASOUND, UPPER GI TRACT, ENDOSCOPIC;  Surgeon: Robbie Yang MD;  Location: [facility];  Service: Endoscopy;  Laterality:  N/A;    ESOPHAGOGASTRODUODENOSCOPY N/A 7/8/2019    Procedure: ESOPHAGOGASTRODUODENOSCOPY (EGD);  Surgeon: Huan Brumfield MD;  Location: [facility] ENDO;  Service: Endoscopy;  Laterality: N/A;    ESOPHAGOGASTRODUODENOSCOPY N/A 5/13/2021    Procedure: EGD (ESOPHAGOGASTRODUODENOSCOPY);  Surgeon: Huan Brumfield MD;  Location: [facility] ENDO;  Service: Endoscopy;  Laterality: N/A;    EXCISION OF HYDROCELE Bilateral 6/16/2021    Procedure: HYDROCELE REPAIR;  Surgeon: William Diaz MD;  Location: [facility] OR 2ND FLR;  Service: Urology;  Laterality: Bilateral;  2 HOURS    FLUOROSCOPY N/A 6/16/2021    Procedure: FLUOROSCOPY;  Surgeon: William Diaz MD;  Location: [facility] OR 2ND FLR;  Service: Urology;  Laterality: N/A;    GASTRECTOMY      INSERTION OF DIALYSIS CATHETER N/A 5/17/2019    Procedure: pleurx;  Surgeon: [facility] Diagnostic Provider;  Location: [facility] OR 2ND FLR;  Service: General;  Laterality: N/A;  Room [facility]    KNEE ARTHROSCOPY      LAPAROSCOPIC CHOLECYSTECTOMY N/A 5/4/2020    Procedure: CHOLECYSTECTOMY, LAPAROSCOPIC;  Surgeon: SON Rowe MD;  Location: [facility];  Service: General;  Laterality: N/A;    LASER LITHOTRIPSY N/A 6/30/2021    Procedure: LITHOTRIPSY, USING LASER;  Surgeon: William Diaz MD;  Location: [facility] OR 1ST FLR;  Service: Urology;  Laterality: N/A;    LIVER BIOPSY N/A 5/4/2020    Procedure: BIOPSY, LIVER, Laproscopic ;  Surgeon: SON Rowe MD;  Location: [facility] OR;  Service: General;  Laterality: N/A;    perianal surgery      perianal cyst removed    PYELOSCOPY Right 6/30/2021    Procedure: PYELOSCOPY;  Surgeon: William Diaz MD;  Location: [facility] OR 1ST FLR;  Service: Urology;  Laterality: Right;    REPLACEMENT OF STENT Right 6/30/2021    Procedure: REPLACEMENT, STENT;  Surgeon: William Diaz MD;  Location: [facility] OR 1ST FLR;  Service: Urology;  Laterality: Right;    URETEROSCOPY Right 6/30/2021    Procedure: URETEROSCOPY FLEXIBLE URETEROSCOPE;  Surgeon: William Diaz MD;   "Location: [facility] OR [address] [place] [address];  Service: Urology;  Laterality: Right;       Review of patient's allergies indicates:  No Known Allergies    No current facility-administered medications on file prior to encounter.     Current Outpatient Medications on File Prior to Encounter   Medication Sig    doxycycline (VIBRAMYCIN) 100 MG Cap Take 1 capsule (100 mg total) by mouth 2 (two) times daily. for 14 doses    insulin lispro-aabc (LYUMJEV U-100 INSULIN) 100 unit/mL Inject 80 Units into the skin continuous. USES IN OMNIPOD INSULIN PUMP. USE AS DIRECTED. DO NOT DIRECTLY INJECT.    tamsulosin (FLOMAX) 0.4 mg Cap Take 1 capsule (0.4 mg total) by mouth every evening.    aspirin (ECOTRIN) 81 MG EC tablet Take 1 tablet (81 mg total) by mouth once daily.    blood-glucose sensor (DEXCOM G6 SENSOR) Sandi Inject 1 each into the skin every 10 days.    blood-glucose transmitter (DEXCOM G6 TRANSMITTER) Sandi Inject 1 each into the skin every 10 days.    glucagon (BAQSIMI) 3 mg/actuation Spry 1 each by Nasal route daily as needed (if glucose is less than 70 - can repeat in 1 hour if still low/less than 70).    HUMALOG U-100 INSULIN 100 unit/mL injection     insulin glargine, TOUJEO, (TOUJEO SOLOSTAR U-300 INSULIN) 300 unit/mL (1.5 mL) InPn pen Inject 30 Units into the skin once daily. Can increase dose by 2 units higher to get to goal fasting glucose 100 - 150 - for max TDD 60 units. Takes daily IN AN EMERGENCY only if OFF OF insulin pump.    insulin pump cart,automated,BT (OMNIPOD 5 G6 PODS, GEN 5,) Crtg Inject 1 each into the skin every other day. Use with omnipod 5 pdm as directed.    pen needle, diabetic 32 gauge x 5/32" Ndle Use with toujeo insulin one daily only as Emergency use/back up insulin - if OFF OF your insulin pump.     Family History       Problem Relation (Age of Onset)    Cancer Mother, Father, Paternal Grandfather, Brother    Diabetes Maternal Grandmother    Heart disease Father    No Known Problems Paternal " Grandmother    Obesity Sister    Parkinsonism Brother    Stroke Maternal Grandfather          Tobacco Use    Smoking status: Former     Current packs/day: 0.00     Average packs/day: 2.0 packs/day for 30.0 years (60.0 ttl pk-yrs)     Types: Cigarettes     Start date: 1975     Quit date: 2005     Years since quittin.7    Smokeless tobacco: Never   Substance and Sexual Activity    Alcohol use: No     Comment: started ~, reports 1 shot daily, max 3 shots daily, vague about alcohol consumption. Last drink 2018    Drug use: No    Sexual activity: Not on file     Review of Systems   Constitutional:  Negative for activity change, chills, fever and unexpected weight change.   HENT:  Negative for congestion and sore throat.    Respiratory:  Negative for cough, shortness of breath and wheezing.    Cardiovascular:  Positive for leg swelling. Negative for chest pain and palpitations.   Gastrointestinal:  Negative for abdominal pain, blood in stool, nausea and vomiting.   Genitourinary:  Positive for flank pain. Negative for dysuria and hematuria.   Musculoskeletal:  Negative for arthralgias and neck pain.   Skin:  Negative for color change and rash.   Neurological:  Negative for dizziness, seizures and numbness.   Psychiatric/Behavioral:  Negative for hallucinations and suicidal ideas.      Objective:     Vital Signs (Most Recent):  Temp: 98 °F (36.7 °C) (10/13/23 0139)  Pulse: 85 (10/13/23 0139)  Resp: (!) 21 (10/13/23 0139)  BP: (!) 197/90 (10/13/23 0139)  SpO2: 96 % (10/13/23 0139) Vital Signs (24h Range):  Temp:  [98 °F (36.7 °C)-98.1 °F (36.7 °C)] 98 °F (36.7 °C)  Pulse:  [76-91] 85  Resp:  [19-23] 21  SpO2:  [96 %-99 %] 96 %  BP: (154-221)/() 197/90     Weight: 126.6 kg (279 lb 1.6 oz)  Body mass index is 43.71 kg/m².     Physical Exam  Vitals reviewed.   Constitutional:       General: He is not in acute distress.     Appearance: He is well-developed.   HENT:      Head: Normocephalic and  atraumatic.   Eyes:      Extraocular Movements: Extraocular movements intact.      Pupils: Pupils are equal, round, and reactive to light.   Neck:      Vascular: No JVD.      Trachea: No tracheal deviation.   Cardiovascular:      Rate and Rhythm: Normal rate and regular rhythm.      Heart sounds: No murmur heard.     No friction rub. No gallop.   Pulmonary:      Effort: No respiratory distress.      Breath sounds: Normal breath sounds. No wheezing or rales.   Abdominal:      General: Bowel sounds are normal. There is no distension.      Palpations: Abdomen is soft. There is no mass.      Tenderness: There is no abdominal tenderness.   Musculoskeletal:         General: No deformity.      Cervical back: Neck supple.      Right lower leg: Edema present.      Left lower leg: Edema present.      Comments: B/L 2+ edema, pt. Reports chronic lymphedema at baseline   Lymphadenopathy:      Cervical: No cervical adenopathy.   Skin:     General: Skin is warm and dry.      Findings: No rash.   Neurological:      Mental Status: He is alert and oriented to person, place, and time.              CRANIAL NERVES     CN III, IV, VI   Pupils are equal, round, and reactive to light.       Significant Labs: All pertinent labs within the past 24 hours have been reviewed.    Significant Imaging: I have reviewed all pertinent imaging results/findings within the past 24 hours.    Assessment/Plan:     * Acute renal failure superimposed on stage 3 chronic kidney disease  -Cr 4.7, baseline ~2.2. Imaging shows Mild right hydronephrosis and right hydroureter with gradual tapering and FENa 8% consistent with post-obstructive etiology. Urology consult for assistance  -IV fluids, avoid nephrotoxins and continue to trend Cr    Other cirrhosis of liver  -Chronic MARIE, no acute issues, compensated      Hypertension associated with diabetes  -Pt. Markedly HTN on admit, reports not taking any HTN medications. Plan to start scheduled amlodipine,  hydralazine ordered PRN. Monitor BG and adjust as needed      Type 2 diabetes mellitus with diabetic neuropathy, with long-term current use of insulin  -Pt. Hyperglycemic to >500 on presentation, suspect acute insulin pump malfunction as BG normal 3 days ago and A1c 7.6  -Levemir 20 units QHS and SSI ordered with diabetic/renal diet. Endocrinology consulted for assistance      VTE Risk Mitigation (From admission, onward)         Ordered     heparin (porcine) injection 5,000 Units  Every 8 hours         10/12/23 2227     IP VTE HIGH RISK PATIENT  Once         10/12/23 2227     Place sequential compression device  Until discontinued         10/12/23 2227                           Reza Wilson MD  Department of Hospital Medicine  [facility] - Intensive Care ([id number])

## 2023-10-13 NOTE — SUBJECTIVE & OBJECTIVE
Past Medical History:   Diagnosis Date    Alcohol abuse     Anasarca 1/28/2019    Anemia     Anticoagulant long-term use     Arthropathy associated with neurological disorder 9/2/2015    Atherosclerosis     Charcot foot due to diabetes mellitus     Chronic combined systolic and diastolic heart failure 01/29/2019 1-28-19 Left VentricleModerate decreased ejection fraction at 30%. Normal left ventricular cavity size. Normal wall thickness observed. Grade I (mild) left ventricular diastolic dysfunction consistent with impaired relaxation. Normal left atrial pressure. Moderate, global hypokinesis(see wall scoring diagram). Right VentricleNormal cavity size, wall thickness and ejection fraction. Wall motion n    Chronic pancreatitis 1/28/2019    CKD (chronic kidney disease) stage 3, GFR 30-59 ml/min     CKD (chronic kidney disease) stage 4, GFR 15-29 ml/min     Colon polyps     approx 5 yrs ago    Coronary artery disease due to calcified coronary lesion 05/08/2015    5 stents on ASA      Diabetic polyneuropathy associated with type 2 diabetes mellitus 9/2/2015    Diverticulosis 1/28/2019    DM type 2 with diabetic peripheral neuropathy 2/4/2019    Encounter for blood transfusion     Essential hypertension 1/28/2019    Former smoker 8/26/2015    Healed ulcer of left foot on examination 6/20/2017    Hematuria     Hydrocele     approx 1.5 yrs ago    Hyperphosphatemia     Hypoalbuminemia 2/4/2019    Hypocalcemia     Lymphedema of both lower extremities 1/29/2019    Mixed hyperlipidemia 5/8/2015    Morbid obesity with BMI of 50.0-59.9, adult 5/8/2015    Obstruction of right ureteropelvic junction (UPJ) due to stone 5/24/2021    Onychomycosis of multiple toenails with type 2 diabetes mellitus and peripheral neuropathy 6/20/2017    Perianal cyst     approx 2 yrs ago    Proteinuria     Pseudocyst of pancreas 1/28/2019 1-28-19 Liver has a cirrhotic morphology with no focal lesions.  Significant interval increase in ascites  when compared to prior exam which may account for patient's abdominal distension.  Hypodense air-fluid collection along the body of the pancreas which is slightly smaller when compared to prior CT.  Findings may relate to pancreatic necrosis with pancreatic pseudocysts with infected pseudocyst    Skin cancer     skin cancer    Sleep apnea 8/26/2015    Status post bariatric surgery 1/11/2016    Type 2 diabetes mellitus, with long-term current use of insulin 5/8/2015    Urinary tract infection        Past Surgical History:   Procedure Laterality Date    ANGIOGRAPHY N/A 6/28/2019    Procedure: ANGIOGRAM-PV STENT;  Surgeon: Ewa Diagnostic Provider;  Location: [facility] OR;  Service: Radiology;  Laterality: N/A;    ANGIOPLASTY      total x5 stents    COLONOSCOPY N/A 10/6/2015    Procedure: COLONOSCOPY;  Surgeon: Shekhar Richards MD;  Location: [facility] (2ND FLR);  Service: Endoscopy;  Laterality: N/A;  BMI over 55/2nd floor case    5 day hold Plavix, Dr Kwadwo Arroyo    COLONOSCOPY N/A 5/13/2021    Procedure: COLONOSCOPY;  Surgeon: Huan Brumfield MD;  Location: [facility] ENDO;  Service: Endoscopy;  Laterality: N/A;    CORONARY ANGIOGRAPHY Right 3/20/2019    Procedure: ANGIOGRAM, CORONARY ARTERY;  Surgeon: Bob Duque MD;  Location: [facility] CATH LAB;  Service: Cardiology;  Laterality: Right;    CORONARY ARTERY BYPASS GRAFT  2017    x3    CORONARY BYPASS GRAFT ANGIOGRAPHY  3/20/2019    Procedure: Bypass graft study;  Surgeon: Bob Duque MD;  Location: [facility] CATH LAB;  Service: Cardiology;;    CYST REMOVAL      CYSTOSCOPY Right 6/30/2021    Procedure: CYSTOSCOPY;  Surgeon: William Diaz MD;  Location: [facility] 1ST FLR;  Service: Urology;  Laterality: Right;    CYSTOSCOPY W/ URETERAL STENT PLACEMENT Right 6/16/2021    Procedure: CYSTOSCOPY, WITH URETERAL STENT INSERTION;  Surgeon: William Diaz MD;  Location: [facility] 2ND FLR;  Service: Urology;  Laterality: Right;  FLUORO LESS THAN 1 HOUR    ENDOSCOPIC ULTRASOUND OF  UPPER GASTROINTESTINAL TRACT N/A 2/26/2020    Procedure: ULTRASOUND, UPPER GI TRACT, ENDOSCOPIC;  Surgeon: Robbie Yang MD;  Location: [facility] ENDO;  Service: Endoscopy;  Laterality: N/A;    ESOPHAGOGASTRODUODENOSCOPY N/A 7/8/2019    Procedure: ESOPHAGOGASTRODUODENOSCOPY (EGD);  Surgeon: Huan Brumfield MD;  Location: [facility] ENDO;  Service: Endoscopy;  Laterality: N/A;    ESOPHAGOGASTRODUODENOSCOPY N/A 5/13/2021    Procedure: EGD (ESOPHAGOGASTRODUODENOSCOPY);  Surgeon: Huan Brumfield MD;  Location: [facility] ENDO;  Service: Endoscopy;  Laterality: N/A;    EXCISION OF HYDROCELE Bilateral 6/16/2021    Procedure: HYDROCELE REPAIR;  Surgeon: William Diaz MD;  Location: [facility] OR 2ND FLR;  Service: Urology;  Laterality: Bilateral;  2 HOURS    FLUOROSCOPY N/A 6/16/2021    Procedure: FLUOROSCOPY;  Surgeon: William Diaz MD;  Location: [facility] OR 2ND FLR;  Service: Urology;  Laterality: N/A;    GASTRECTOMY      INSERTION OF DIALYSIS CATHETER N/A 5/17/2019    Procedure: pleurx;  Surgeon: [facility] Diagnostic Provider;  Location: [facility] OR 2ND FLR;  Service: General;  Laterality: N/A;  Room 188/[facility]    KNEE ARTHROSCOPY      LAPAROSCOPIC CHOLECYSTECTOMY N/A 5/4/2020    Procedure: CHOLECYSTECTOMY, LAPAROSCOPIC;  Surgeon: SON Rowe MD;  Location: [facility] OR;  Service: General;  Laterality: N/A;    LASER LITHOTRIPSY N/A 6/30/2021    Procedure: LITHOTRIPSY, USING LASER;  Surgeon: William Diaz MD;  Location: [facility] OR 1ST FLR;  Service: Urology;  Laterality: N/A;    LIVER BIOPSY N/A 5/4/2020    Procedure: BIOPSY, LIVER, Laproscopic ;  Surgeon: SON Rowe MD;  Location: [facility] OR;  Service: General;  Laterality: N/A;    perianal surgery      perianal cyst removed    PYELOSCOPY Right 6/30/2021    Procedure: PYELOSCOPY;  Surgeon: William Diaz MD;  Location: [facility] OR 1ST FLR;  Service: Urology;  Laterality: Right;    REPLACEMENT OF STENT Right 6/30/2021    Procedure: REPLACEMENT, STENT;  Surgeon: William Diaz MD;  Location:  [facility] OR 1ST FLR;  Service: Urology;  Laterality: Right;    URETEROSCOPY Right 2021    Procedure: URETEROSCOPY FLEXIBLE URETEROSCOPE;  Surgeon: William Diaz MD;  Location: [facility] OR [address] [place] [address];  Service: Urology;  Laterality: Right;       Review of patient's allergies indicates:  No Known Allergies  Current Facility-Administered Medications   Medication Frequency    acetaminophen tablet 650 mg Q4H PRN    amLODIPine tablet 10 mg Daily    clindamycin in D5W 600 mg/50 mL IVPB 600 mg Q6H    dextrose 10% bolus 125 mL 125 mL PRN    dextrose 10% bolus 250 mL 250 mL PRN    glucagon (human recombinant) injection 1 mg PRN    glucose chewable tablet 16 g PRN    glucose chewable tablet 24 g PRN    heparin (porcine) injection 5,000 Units Q8H    hydrALAZINE tablet 50 mg Q8H PRN    insulin aspart U-100 pen 0-10 Units Q6H PRN    insulin detemir U-100 (Levemir) pen 20 Units QHS    melatonin tablet 6 mg Nightly PRN    naloxone 0.4 mg/mL injection 0.02 mg PRN    ondansetron injection 4 mg Q8H PRN    prochlorperazine injection Soln 5 mg Q6H PRN    sodium chloride 0.9% flush 10 mL PRN    tamsulosin 24 hr capsule 0.4 mg QHS     Family History       Problem Relation (Age of Onset)    Cancer Mother, Father, Paternal Grandfather, Brother    Diabetes Maternal Grandmother    Heart disease Father    No Known Problems Paternal Grandmother    Obesity Sister    Parkinsonism Brother    Stroke Maternal Grandfather          Tobacco Use    Smoking status: Former     Current packs/day: 0.00     Average packs/day: 2.0 packs/day for 30.0 years (60.0 ttl pk-yrs)     Types: Cigarettes     Start date: 1975     Quit date: 2005     Years since quittin.7    Smokeless tobacco: Never   Substance and Sexual Activity    Alcohol use: No     Comment: started ~, reports 1 shot daily, max 3 shots daily, vague about alcohol consumption. Last drink 2018    Drug use: No    Sexual activity: Not on file     Review of Systems   Constitutional:  Positive  for activity change. Negative for appetite change and fever.   HENT:  Negative for trouble swallowing.    Respiratory:  Negative for cough and shortness of breath.    Cardiovascular:  Positive for leg swelling. Negative for chest pain.   Gastrointestinal:  Positive for abdominal distention. Negative for blood in stool, diarrhea, nausea and vomiting.   Genitourinary:  Positive for flank pain. Negative for difficulty urinating.   Musculoskeletal:  Negative for neck stiffness.   Neurological:  Negative for headaches.   Psychiatric/Behavioral:  Negative for agitation, behavioral problems and confusion.      Objective:     Vital Signs (Most Recent):  Temp: 98 °F (36.7 °C) (10/13/23 1118)  Pulse: 72 (10/13/23 1222)  Resp: 18 (10/13/23 1118)  BP: 122/65 (10/13/23 1118)  SpO2: 98 % (10/13/23 1118) Vital Signs (24h Range):  Temp:  [98 °F (36.7 °C)-98.7 °F (37.1 °C)] 98 °F (36.7 °C)  Pulse:  [72-97] 72  Resp:  [18-23] 18  SpO2:  [96 %-99 %] 98 %  BP: (122-221)/() 122/65     Weight: 126.6 kg (279 lb) (10/13/23 1025)  Body mass index is 43.7 kg/m².  Body surface area is 2.45 meters squared.    I/O last 3 completed shifts:  In: -   Out: 300 [Urine:300]     Physical Exam  Vitals and nursing note reviewed.   Constitutional:       General: He is not in acute distress.     Appearance: He is not ill-appearing, toxic-appearing or diaphoretic.   HENT:      Head: Normocephalic and atraumatic.      Right Ear: External ear normal.      Left Ear: External ear normal.      Nose: Nose normal.      Mouth/Throat:      Mouth: Mucous membranes are moist.      Pharynx: Oropharynx is clear.   Eyes:      General: No scleral icterus.     Extraocular Movements: Extraocular movements intact.      Conjunctiva/sclera: Conjunctivae normal.      Pupils: Pupils are equal, round, and reactive to light.   Cardiovascular:      Rate and Rhythm: Normal rate and regular rhythm.      Pulses: Normal pulses.      Heart sounds: Normal heart sounds.    Pulmonary:      Effort: Pulmonary effort is normal. No respiratory distress.   Abdominal:      General: There is no distension.      Tenderness: There is no abdominal tenderness. There is no right CVA tenderness, left CVA tenderness, guarding or rebound.   Musculoskeletal:         General: No swelling or deformity. Normal range of motion.      Cervical back: Normal range of motion.      Right lower leg: Edema present.      Left lower leg: Edema present.   Skin:     General: Skin is warm and dry.      Coloration: Skin is not jaundiced.   Neurological:      General: No focal deficit present.      Mental Status: He is alert and oriented to person, place, and time. Mental status is at baseline.      Motor: No weakness.          Significant Labs:  CBC:   Recent Labs   Lab 10/13/23  0526   WBC 5.15   RBC 3.78*   HGB 11.4*   HCT 33.8*   *   MCV 89   MCH 30.2   MCHC 33.7     CMP:   Recent Labs   Lab 10/13/23  0526   *   CALCIUM 8.0*   ALBUMIN 2.4*   PROT 5.5*   *   K 4.6   CO2 20*      BUN 37*   CREATININE 4.5*   ALKPHOS 131   ALT 17   AST 24   BILITOT 0.2     Recent Labs   Lab 10/12/23  1238   COLORU Yellow   SPECGRAV 1.025   PHUR 6.0   PROTEINUA 3+*   BACTERIA Rare   NITRITE Negative   LEUKOCYTESUR Negative   HYALINECASTS 0     All labs within the past 24 hours have been reviewed.    Significant Imaging:  CT: Reviewed  Echo: Reviewed  CT with bilateral perinephric stranding, R hydronephrosis. Echo w/ normal EF 50-55%, normal diastolic function, CVP 3.

## 2023-10-13 NOTE — HPI
"Jian Arrieta is a 68M w/ MARIE (liver bx 3/2019 w/o cirrhosis), s/p sleeve gastrectomy, CAD s/p CABG, CKD3 (BL creatinine 2.2), and history of obstructive nephrolithiasis who presents from nephrology clinic w/ worsening renal function x 2 weeks. Recent admission to [facility]/ nephrolithiasis of R kidney. Was discharged from  w/ doxycycline out of concern for pyelonephritis. In clinic, creatinine was noted to be significantly elevated to 4.7 prompting ED referral. On arrival, reported improving but persistent flank pain. Denies any fevers/chills/N/V. Denies changes in urine output, dysuria, or hematuria. Of note, blood glucose 516 on arrival.    Nephrology consulted for "acute on chronic kidney failure."  "

## 2023-10-13 NOTE — ASSESSMENT & PLAN NOTE
eam not consistent with cellulitis  Recent course of doxycycline as outpt.   Empiric clindamycin IV.- stopped  IVFs- one liter tonight

## 2023-10-13 NOTE — ASSESSMENT & PLAN NOTE
221/94 upon admit  Hypertensive Emergency present on admission, with ARF  reports not taking any HTN medications. Plan to start scheduled amlodipine, hydralazine ordered PRN. Monitor BG and adjust as needed    10/13- /79 on norvasc. ->  176/83 . Prn hydralazine po is ordered

## 2023-10-14 LAB
ALBUMIN SERPL BCP-MCNC: 2.2 G/DL (ref 3.5–5.2)
ALP SERPL-CCNC: 114 U/L (ref 55–135)
ALT SERPL W/O P-5'-P-CCNC: 15 U/L (ref 10–44)
ANION GAP SERPL CALC-SCNC: 8 MMOL/L (ref 8–16)
ANION GAP SERPL CALC-SCNC: 9 MMOL/L (ref 8–16)
AST SERPL-CCNC: 20 U/L (ref 10–40)
BASOPHILS # BLD AUTO: 0.04 K/UL (ref 0–0.2)
BASOPHILS NFR BLD: 0.8 % (ref 0–1.9)
BILIRUB SERPL-MCNC: 0.3 MG/DL (ref 0.1–1)
BUN SERPL-MCNC: 35 MG/DL (ref 8–23)
BUN SERPL-MCNC: 35 MG/DL (ref 8–23)
CALCIUM SERPL-MCNC: 7.9 MG/DL (ref 8.7–10.5)
CALCIUM SERPL-MCNC: 7.9 MG/DL (ref 8.7–10.5)
CHLORIDE SERPL-SCNC: 113 MMOL/L (ref 95–110)
CHLORIDE SERPL-SCNC: 114 MMOL/L (ref 95–110)
CO2 SERPL-SCNC: 18 MMOL/L (ref 23–29)
CO2 SERPL-SCNC: 20 MMOL/L (ref 23–29)
CREAT SERPL-MCNC: 3.7 MG/DL (ref 0.5–1.4)
CREAT SERPL-MCNC: 3.9 MG/DL (ref 0.5–1.4)
DIFFERENTIAL METHOD: ABNORMAL
EOSINOPHIL # BLD AUTO: 0.2 K/UL (ref 0–0.5)
EOSINOPHIL NFR BLD: 4.3 % (ref 0–8)
ERYTHROCYTE [DISTWIDTH] IN BLOOD BY AUTOMATED COUNT: 13.7 % (ref 11.5–14.5)
EST. GFR  (NO RACE VARIABLE): 15.9 ML/MIN/1.73 M^2
EST. GFR  (NO RACE VARIABLE): 16.9 ML/MIN/1.73 M^2
GLUCOSE SERPL-MCNC: 103 MG/DL (ref 70–110)
GLUCOSE SERPL-MCNC: 95 MG/DL (ref 70–110)
HCT VFR BLD AUTO: 33.2 % (ref 40–54)
HGB BLD-MCNC: 11.4 G/DL (ref 14–18)
IMM GRANULOCYTES # BLD AUTO: 0.02 K/UL (ref 0–0.04)
IMM GRANULOCYTES NFR BLD AUTO: 0.4 % (ref 0–0.5)
LYMPHOCYTES # BLD AUTO: 1.5 K/UL (ref 1–4.8)
LYMPHOCYTES NFR BLD: 30.3 % (ref 18–48)
MCH RBC QN AUTO: 30.5 PG (ref 27–31)
MCHC RBC AUTO-ENTMCNC: 34.3 G/DL (ref 32–36)
MCV RBC AUTO: 89 FL (ref 82–98)
MONOCYTES # BLD AUTO: 0.4 K/UL (ref 0.3–1)
MONOCYTES NFR BLD: 8.4 % (ref 4–15)
NEUTROPHILS # BLD AUTO: 2.8 K/UL (ref 1.8–7.7)
NEUTROPHILS NFR BLD: 55.8 % (ref 38–73)
NRBC BLD-RTO: 0 /100 WBC
PLATELET # BLD AUTO: 160 K/UL (ref 150–450)
PMV BLD AUTO: 10.9 FL (ref 9.2–12.9)
POCT GLUCOSE: 163 MG/DL (ref 70–110)
POCT GLUCOSE: 189 MG/DL (ref 70–110)
POCT GLUCOSE: 85 MG/DL (ref 70–110)
POCT GLUCOSE: 92 MG/DL (ref 70–110)
POTASSIUM SERPL-SCNC: 4.2 MMOL/L (ref 3.5–5.1)
POTASSIUM SERPL-SCNC: 4.4 MMOL/L (ref 3.5–5.1)
PROT SERPL-MCNC: 5.2 G/DL (ref 6–8.4)
RBC # BLD AUTO: 3.74 M/UL (ref 4.6–6.2)
SODIUM SERPL-SCNC: 141 MMOL/L (ref 136–145)
SODIUM SERPL-SCNC: 141 MMOL/L (ref 136–145)
WBC # BLD AUTO: 5.09 K/UL (ref 3.9–12.7)

## 2023-10-14 PROCEDURE — A4216 STERILE WATER/SALINE, 10 ML: HCPCS | Performed by: HOSPITALIST

## 2023-10-14 PROCEDURE — 21400001 HC TELEMETRY ROOM

## 2023-10-14 PROCEDURE — 25000003 PHARM REV CODE 250

## 2023-10-14 PROCEDURE — 99233 PR SUBSEQUENT HOSPITAL CARE,LEVL III: ICD-10-PCS | Mod: ,,, | Performed by: HOSPITALIST

## 2023-10-14 PROCEDURE — 36415 COLL VENOUS BLD VENIPUNCTURE: CPT | Performed by: STUDENT IN AN ORGANIZED HEALTH CARE EDUCATION/TRAINING PROGRAM

## 2023-10-14 PROCEDURE — 80053 COMPREHEN METABOLIC PANEL: CPT | Performed by: HOSPITALIST

## 2023-10-14 PROCEDURE — 63600175 PHARM REV CODE 636 W HCPCS: Performed by: HOSPITALIST

## 2023-10-14 PROCEDURE — 85025 COMPLETE CBC W/AUTO DIFF WBC: CPT | Performed by: HOSPITALIST

## 2023-10-14 PROCEDURE — 99233 SBSQ HOSP IP/OBS HIGH 50: CPT | Mod: ,,, | Performed by: HOSPITALIST

## 2023-10-14 PROCEDURE — 99232 SBSQ HOSP IP/OBS MODERATE 35: CPT | Mod: ,,, | Performed by: NURSE PRACTITIONER

## 2023-10-14 PROCEDURE — 80048 BASIC METABOLIC PNL TOTAL CA: CPT | Mod: XB | Performed by: STUDENT IN AN ORGANIZED HEALTH CARE EDUCATION/TRAINING PROGRAM

## 2023-10-14 PROCEDURE — 25000003 PHARM REV CODE 250: Performed by: HOSPITALIST

## 2023-10-14 PROCEDURE — 99232 PR SUBSEQUENT HOSPITAL CARE,LEVL II: ICD-10-PCS | Mod: ,,, | Performed by: NURSE PRACTITIONER

## 2023-10-14 RX ORDER — SODIUM CHLORIDE 9 MG/ML
INJECTION, SOLUTION INTRAVENOUS CONTINUOUS
Status: DISCONTINUED | OUTPATIENT
Start: 2023-10-14 | End: 2023-10-17 | Stop reason: HOSPADM

## 2023-10-14 RX ADMIN — AMLODIPINE BESYLATE 10 MG: 10 TABLET ORAL at 09:10

## 2023-10-14 RX ADMIN — HEPARIN SODIUM 5000 UNITS: 5000 INJECTION INTRAVENOUS; SUBCUTANEOUS at 02:10

## 2023-10-14 RX ADMIN — HEPARIN SODIUM 5000 UNITS: 5000 INJECTION INTRAVENOUS; SUBCUTANEOUS at 05:10

## 2023-10-14 RX ADMIN — INSULIN ASPART 6 UNITS: 100 INJECTION, SOLUTION INTRAVENOUS; SUBCUTANEOUS at 12:10

## 2023-10-14 RX ADMIN — TAMSULOSIN HYDROCHLORIDE 0.4 MG: 0.4 CAPSULE ORAL at 08:10

## 2023-10-14 RX ADMIN — INSULIN DETEMIR 25 UNITS: 100 INJECTION, SOLUTION SUBCUTANEOUS at 08:10

## 2023-10-14 RX ADMIN — SODIUM CHLORIDE: 9 INJECTION, SOLUTION INTRAVENOUS at 06:10

## 2023-10-14 RX ADMIN — Medication 10 ML: at 06:10

## 2023-10-14 RX ADMIN — HEPARIN SODIUM 5000 UNITS: 5000 INJECTION INTRAVENOUS; SUBCUTANEOUS at 10:10

## 2023-10-14 RX ADMIN — INSULIN ASPART 6 UNITS: 100 INJECTION, SOLUTION INTRAVENOUS; SUBCUTANEOUS at 05:10

## 2023-10-14 RX ADMIN — SODIUM CHLORIDE: 9 INJECTION, SOLUTION INTRAVENOUS at 09:10

## 2023-10-14 NOTE — SUBJECTIVE & OBJECTIVE
Interval History: see above    Review of Systems   Constitutional:  Positive for activity change. Negative for appetite change and fever.   HENT:  Negative for trouble swallowing.    Respiratory:  Negative for cough and shortness of breath.    Cardiovascular:  Positive for leg swelling. Negative for chest pain.   Gastrointestinal:  Positive for abdominal distention. Negative for blood in stool, diarrhea, nausea and vomiting.   Genitourinary:  Negative for difficulty urinating.   Musculoskeletal:  Positive for back pain (falnks, bilat (PTA)). Negative for arthralgias, gait problem and neck stiffness.   Skin:  Positive for rash (chronicnLE erythema due to venous stasis).   Neurological:  Negative for headaches.   Psychiatric/Behavioral:  Negative for agitation, behavioral problems and confusion.      Objective:     Vital Signs (Most Recent):  Temp: 98 °F (36.7 °C) (10/14/23 0751)  Pulse: 82 (10/14/23 0751)  Resp: 18 (10/14/23 0751)  BP: 137/65 (10/14/23 0751)  SpO2: 96 % (10/14/23 0751) Vital Signs (24h Range):  Temp:  [98 °F (36.7 °C)-98.2 °F (36.8 °C)] 98 °F (36.7 °C)  Pulse:  [72-89] 82  Resp:  [18] 18  SpO2:  [95 %-98 %] 96 %  BP: (122-176)/(63-83) 137/65     Weight: 126.6 kg (279 lb)  Body mass index is 43.7 kg/m².    Intake/Output Summary (Last 24 hours) at 10/14/2023 0929  Last data filed at 10/13/2023 2200  Gross per 24 hour   Intake 440 ml   Output 320 ml   Net 120 ml         Physical Exam  Constitutional:       General: He is not in acute distress.     Appearance: Normal appearance. He is obese. He is not ill-appearing, toxic-appearing or diaphoretic.   HENT:      Head: Normocephalic and atraumatic.      Nose: Nose normal.      Comments: rhynophyma     Mouth/Throat:      Mouth: Mucous membranes are moist.      Pharynx: Oropharynx is clear.   Eyes:      General: No scleral icterus.     Extraocular Movements: Extraocular movements intact.      Conjunctiva/sclera: Conjunctivae normal.      Pupils: Pupils are  equal, round, and reactive to light.   Cardiovascular:      Rate and Rhythm: Normal rate and regular rhythm.      Pulses: Normal pulses.      Heart sounds: Normal heart sounds.   Pulmonary:      Effort: Pulmonary effort is normal. No respiratory distress.      Breath sounds: Normal breath sounds. No stridor. No wheezing, rhonchi or rales.   Chest:      Chest wall: No tenderness.   Abdominal:      General: Abdomen is flat. Bowel sounds are normal. There is no distension.      Palpations: Abdomen is soft.      Tenderness: There is no abdominal tenderness. There is no right CVA tenderness, left CVA tenderness, guarding or rebound.   Musculoskeletal:         General: No swelling, tenderness, deformity or signs of injury. Normal range of motion.      Cervical back: Normal range of motion and neck supple. No rigidity or tenderness.      Right lower leg: Edema present.      Left lower leg: Edema present.   Lymphadenopathy:      Cervical: No cervical adenopathy.   Skin:     General: Skin is warm and dry.      Coloration: Skin is not jaundiced.      Findings: Rash (venous stasis dermatists, not hot to touch) present. No erythema.   Neurological:      General: No focal deficit present.      Mental Status: He is alert and oriented to person, place, and time. Mental status is at baseline.      Motor: No weakness.      Coordination: Coordination normal.      Gait: Gait normal.   Psychiatric:         Mood and Affect: Mood normal.         Behavior: Behavior normal.         Thought Content: Thought content normal.         Judgment: Judgment normal.             Significant Labs: All pertinent labs within the past 24 hours have been reviewed.  CBC:   Recent Labs   Lab 10/12/23  1802 10/13/23  0526 10/14/23  0538   WBC 5.54 5.15 5.09   HGB 12.2* 11.4* 11.4*   HCT 35.3* 33.8* 33.2*    147* 160     CMP:   Recent Labs   Lab 10/12/23  1231 10/12/23  1802 10/13/23  0526 10/14/23  0538   * 132* 134* 141   K 5.2* 5.7* 4.6 4.4     104 108 113*   CO2 22* 19* 20* 20*   * 516* 340* 95   BUN 39* 38* 37* 35*   CREATININE 4.7* 4.7* 4.5* 3.7*   CALCIUM 8.3* 8.2* 8.0* 7.9*   PROT 6.5 6.7 5.5*  --    ALBUMIN 2.9* 2.8* 2.4*  --    BILITOT 0.4 0.3 0.2  --    ALKPHOS 160* 156* 131  --    AST 18 26 24  --    ALT 16 18 17  --    ANIONGAP 8 9 6* 8       Significant Imaging: I have reviewed all pertinent imaging results/findings within the past 24 hours.

## 2023-10-14 NOTE — ASSESSMENT & PLAN NOTE
221/94 upon admit  Hypertensive Emergency present on admission, with ARF  reports not taking any HTN medications. Plan to start scheduled amlodipine, hydralazine ordered PRN. Monitor BG and adjust as needed    10/13- /79 on norvasc. ->  176/83 . Prn hydralazine po is ordered   10/14- /65 received one dose of hydralazine yesterday

## 2023-10-14 NOTE — PLAN OF CARE
Problem: Adult Inpatient Plan of Care  Goal: Plan of Care Review  Outcome: Ongoing, Progressing     Problem: Adult Inpatient Plan of Care  Goal: Patient-Specific Goal (Individualized)  Outcome: Ongoing, Progressing     Problem: Diabetes Comorbidity  Goal: Blood Glucose Level Within Targeted Range  Outcome: Ongoing, Progressing     Problem: Fluid and Electrolyte Imbalance (Acute Kidney Injury/Impairment)  Goal: Fluid and Electrolyte Balance  Outcome: Ongoing, Progressing     Problem: Oral Intake Inadequate (Acute Kidney Injury/Impairment)  Goal: Optimal Nutrition Intake  Outcome: Ongoing, Progressing     Problem: Renal Function Impairment (Acute Kidney Injury/Impairment)  Goal: Effective Renal Function  Outcome: Ongoing, Progressing

## 2023-10-14 NOTE — ASSESSMENT & PLAN NOTE
-Cr 4.7, baseline ~2.2. Imaging shows Mild right hydronephrosis and right hydroureter with gradual tapering and FENa 8% consistent with post-obstructive etiology. Urology consult for assistance  -IV fluids, avoid nephrotoxins and continue to trend Cr    10/13- Nephrology and urology were consulted May be due to hypertensive emergency in the setting of chronic kidney disease.  Monitoring UO.- only 300 cc last night. Post void residual = 0. Had a loose stool.  Consider cardiorenal syndrome. Will get ECHO. Urology planning on stent placement in am.     10/14- continue IVFs today. ECHO was NL.  Treat BP.

## 2023-10-14 NOTE — ASSESSMENT & PLAN NOTE
Lab Results   Component Value Date    CREATININE 3.9 (H) 10/14/2023    CREATININE 3.7 (H) 10/14/2023     Avoid insulin stacking  Titrate insulin slowly

## 2023-10-14 NOTE — ASSESSMENT & PLAN NOTE
BG goal 140-180    Noted to have  on arrival and patient stating that his insulin pump malfunctioned and he is not sure how long it has been off. States the problem is his pod is not communicating with the pump.     Plan:  -Continue Levemir 25 units q HS  -Continue Novolog 4-6 units TID with meals (once diet progresses) Give 4 units if patient eats 25-50% of meal and 6 units if patient eats 50% or greater  -Low Dose Correction Scale  -BG monitoring ac/hs/0200    Leave insulin pump off at this time.     Discharge plans: TBD  Will attempt to retry placing insulin pump on prior to discharge. If unable to fix the malfunction, patient will need to discharge on MDI regimen until follow up with Ivanna Braxton and contact with [employer].   Lab Results   Component Value Date    HGBA1C 7.6 (H) 10/09/2023

## 2023-10-14 NOTE — PLAN OF CARE
Problem: Adult Inpatient Plan of Care  Goal: Plan of Care Review  Outcome: Ongoing, Progressing     Problem: Adult Inpatient Plan of Care  Goal: Patient-Specific Goal (Individualized)  Outcome: Ongoing, Progressing     Problem: Adult Inpatient Plan of Care  Goal: Absence of Hospital-Acquired Illness or Injury  Outcome: Ongoing, Progressing     Problem: Adult Inpatient Plan of Care  Goal: Optimal Comfort and Wellbeing  Outcome: Ongoing, Progressing     Problem: Diabetes Comorbidity  Goal: Blood Glucose Level Within Targeted Range  Outcome: Ongoing, Progressing   POC reviewed, verbalized understanding. Pt remained free from falls, fall precautions in place. Pt is NSR on cardiac monitor. VSS. No other c/o at this time. PIV intact. Call bell and personal belongings within reach. Hourly rounding complete. Reminded to call for assistance. Will continue to monitor.

## 2023-10-14 NOTE — SUBJECTIVE & OBJECTIVE
Interval History: Denies any pain. Resting comfortably in bed      Objective:     Temp:  [98 °F (36.7 °C)-98.2 °F (36.8 °C)] 98 °F (36.7 °C)  Pulse:  [72-89] 79  Resp:  [18] 18  SpO2:  [95 %-98 %] 96 %  BP: (122-176)/(63-84) 136/73     Body mass index is 43.7 kg/m².      Post Void Cath Residual Output (mL): 0 mL (10/13/23 1435)    Drains       Drain  Duration                  Ureteral Drain/Stent 06/16/21 0912 Right ureter 24 Fr. 849 days                     Physical Exam  Constitutional:       General: He is not in acute distress.  HENT:      Head: Normocephalic.   Eyes:      Extraocular Movements: Extraocular movements intact.   Cardiovascular:      Rate and Rhythm: Normal rate.   Pulmonary:      Effort: Pulmonary effort is normal. No respiratory distress.   Abdominal:      Palpations: Abdomen is soft.      Tenderness: There is no right CVA tenderness or left CVA tenderness.   Musculoskeletal:         General: No swelling or deformity.      Cervical back: Normal range of motion.   Skin:     General: Skin is warm and dry.   Neurological:      General: No focal deficit present.      Mental Status: He is alert.   Psychiatric:         Mood and Affect: Mood normal.         Behavior: Behavior normal.           Significant Labs:    BMP:  Recent Labs   Lab 10/12/23  1802 10/13/23  0526 10/14/23  0538   * 134* 141   K 5.7* 4.6 4.4    108 113*   CO2 19* 20* 20*   BUN 38* 37* 35*   CREATININE 4.7* 4.5* 3.7*   CALCIUM 8.2* 8.0* 7.9*       CBC:   Recent Labs   Lab 10/12/23  1231 10/12/23  1802 10/13/23  0526   WBC 5.82 5.54 5.15   HGB 12.3* 12.2* 11.4*   HCT 36.8* 35.3* 33.8*    166 147*       All pertinent labs results from the past 24 hours have been reviewed.    Significant Imaging:  All pertinent imaging results/findings from the past 24 hours have been reviewed.

## 2023-10-14 NOTE — PROGRESS NOTES
[facility] ([place] [address])  Endocrinology  Progress Note    Admit Date: 10/12/2023     Reason for Consult: Management of T1DM (ELOISE), Hyperglycemia     Surgical Procedure and Date: n/a    Diabetes diagnosis year: 7-8 years ago    diagnosed with T2DM originally in 2016 -   He has history of pancreatitis in 2018 - portal vein thrombosis and underwent angioplasty.   now treated as T1 - ELOISE -     Home Diabetes Medications:    Omnipod 5 - Lyumjev   Total daily dose is 38.4 units/day   75% is coming from basal rate.   25% is coming from bolusing.   Active insulin time is 3 hours -   12am - 1.1 units/hour  7am - 1.2 units/hour   Carb ratio - 1 gram/1 unit (he puts in boluses to eat).   He gives 6 or 8 units for a meal - small or large meal.   ISF at 40  Target 110       Lab Results   Component Value Date    HGBA1C 7.6 (H) 10/09/2023       How often checking glucose at home? Dexcom G6   BG readings on regimen:  > 400 the last few days   Hypoglycemia on the regimen?  No  Missed doses on regimen?  Yes, often forgets to bolus     Diabetes Complications include:     Hyperglycemia, Diabetic nephropathy  , Diabetic chronic kidney disease     , and Diabetic peripheral neuropathy     Complicating diabetes co morbidities:   Obesity, CAD s/p CABG, CKD3, MARIE       HPI:   Patient is a 68 y.o. male with a diagnosis of MARIE (liver bx 3/2019 w/o cirrhosis), s/p sleeve gastrectomy, CAD s/p CABG, CKD3 (BL creatinine 2.2), and history of obstructive nephrolithiasis who presents from nephrology clinic w/ worsening renal function x 2 weeks. Recent admission to  w/ nephrolithiasis of R kidney. Was discharged from  w/ doxycycline out of concern for pyelonephritis. In clinic, creatinine was noted to be significantly elevated to 4.7 prompting ED referral. On arrival, reported improving but persistent flank pain. Denies any fevers/chills/N/V. Denies changes in urine output, dysuria, or hematuria. Of note, blood glucose 516 on  "arrival. Endocrinology consulted for management of T1DM.         Interval HPI:   Overnight events: NAEON. BG within goal ranges this morning on current SQ insulin regimen. Creatinine 3.9. Diet diabetic 2000 Calorie; Fluid - 1500mL    Eatin%  Nausea: No  Hypoglycemia and intervention: No  Fever: No  TPN and/or TF: No  If yes, type of TF/TPN and rate: n/a    /65   Pulse 82   Temp 98 °F (36.7 °C) (Oral)   Resp 18   Ht 5' 7" (1.702 m)   Wt 126.6 kg (279 lb)   SpO2 96%   BMI 43.70 kg/m²     Labs Reviewed and Include    Recent Labs   Lab 10/14/23  0538     95   CALCIUM 7.9*  7.9*   ALBUMIN 2.2*   PROT 5.2*     141   K 4.2  4.4   CO2 18*  20*   *  113*   BUN 35*  35*   CREATININE 3.9*  3.7*   ALKPHOS 114   ALT 15   AST 20   BILITOT 0.3     Lab Results   Component Value Date    WBC 5.09 10/14/2023    HGB 11.4 (L) 10/14/2023    HCT 33.2 (L) 10/14/2023    MCV 89 10/14/2023     10/14/2023     No results for input(s): "TSH", "FREET4" in the last 168 hours.  Lab Results   Component Value Date    HGBA1C 7.6 (H) 10/09/2023       Nutritional status:   Body mass index is 43.7 kg/m².  Lab Results   Component Value Date    ALBUMIN 2.2 (L) 10/14/2023    ALBUMIN 2.4 (L) 10/13/2023    ALBUMIN 2.8 (L) 10/12/2023     Lab Results   Component Value Date    PREALBUMIN 9 (L) 2019       Estimated Creatinine Clearance: 22.8 mL/min (A) (based on SCr of 3.9 mg/dL (H)).    Accu-Checks  Recent Labs     10/12/23  2149 10/13/23  0138 10/13/23  0748 10/13/23  1117 10/13/23  1356 10/13/23  1533 10/13/23  1817 10/13/23  1920 10/13/23  2036 10/14/23  0749   POCTGLUCOSE 207* 275* 284* 238* 234* 198* 352* 301* 229* 92       Current Medications and/or Treatments Impacting Glycemic Control  Immunotherapy:    Immunosuppressants       None          Steroids:   Hormones (From admission, onward)      Start     Stop Route Frequency Ordered    10/12/23 4447  melatonin tablet 6 mg         -- Oral Nightly " PRN 10/12/23 2227          Pressors:    Autonomic Drugs (From admission, onward)      None          Hyperglycemia/Diabetes Medications:   Antihyperglycemics (From admission, onward)      Start     Stop Route Frequency Ordered    10/13/23 2100  insulin detemir U-100 (Levemir) pen 25 Units         -- SubQ Nightly 10/13/23 1327    10/13/23 1700  insulin aspart U-100 pen 4-6 Units         -- SubQ 3 times daily with meals 10/13/23 1551    10/13/23 1650  insulin aspart U-100 pen 0-5 Units         -- SubQ Before meals & nightly PRN 10/13/23 1551            ASSESSMENT and PLAN    Cardiac/Vascular  Hyperlipidemia associated with type 2 diabetes mellitus  May increase insulin resistance.         Renal/  * Acute renal failure superimposed on stage 3 chronic kidney disease  Lab Results   Component Value Date    CREATININE 3.9 (H) 10/14/2023    CREATININE 3.7 (H) 10/14/2023     Avoid insulin stacking  Titrate insulin slowly       Endocrine  Class 3 obesity  Body mass index is 43.7 kg/m².  May increase insulin resistance.         Insulin pump status  Off at this time.     Will attempt to place new pod and restart pump tomorrow.         Type 2 diabetes mellitus with diabetic neuropathy, with long-term current use of insulin  BG goal 140-180    Noted to have  on arrival and patient stating that his insulin pump malfunctioned and he is not sure how long it has been off. States the problem is his pod is not communicating with the pump.     Plan:  -Continue Levemir 25 units q HS  -Continue Novolog 4-6 units TID with meals (once diet progresses) Give 4 units if patient eats 25-50% of meal and 6 units if patient eats 50% or greater  -Low Dose Correction Scale  -BG monitoring ac/hs/0200    Leave insulin pump off at this time.     Discharge plans: TBD  Will attempt to retry placing insulin pump on prior to discharge. If unable to fix the malfunction, patient will need to discharge on MDI regimen until follow up with Ivanna Braxton  and contact with Omnipod.   Lab Results   Component Value Date    HGBA1C 7.6 (H) 10/09/2023               Lauren Ibarra NP  Endocrinology  [facility] - Intensive Care ([place]-[facility])

## 2023-10-14 NOTE — SUBJECTIVE & OBJECTIVE
"Interval HPI:   Overnight events: NAEON. BG within goal ranges this morning on current SQ insulin regimen. Creatinine 3.9. Diet diabetic 2000 Calorie; Fluid - 1500mL    Eatin%  Nausea: No  Hypoglycemia and intervention: No  Fever: No  TPN and/or TF: No  If yes, type of TF/TPN and rate: n/a    /65   Pulse 82   Temp 98 °F (36.7 °C) (Oral)   Resp 18   Ht 5' 7" (1.702 m)   Wt 126.6 kg (279 lb)   SpO2 96%   BMI 43.70 kg/m²     Labs Reviewed and Include    Recent Labs   Lab 10/14/23  0538     95   CALCIUM 7.9*  7.9*   ALBUMIN 2.2*   PROT 5.2*     141   K 4.2  4.4   CO2 18*  20*   *  113*   BUN 35*  35*   CREATININE 3.9*  3.7*   ALKPHOS 114   ALT 15   AST 20   BILITOT 0.3     Lab Results   Component Value Date    WBC 5.09 10/14/2023    HGB 11.4 (L) 10/14/2023    HCT 33.2 (L) 10/14/2023    MCV 89 10/14/2023     10/14/2023     No results for input(s): "TSH", "FREET4" in the last 168 hours.  Lab Results   Component Value Date    HGBA1C 7.6 (H) 10/09/2023       Nutritional status:   Body mass index is 43.7 kg/m².  Lab Results   Component Value Date    ALBUMIN 2.2 (L) 10/14/2023    ALBUMIN 2.4 (L) 10/13/2023    ALBUMIN 2.8 (L) 10/12/2023     Lab Results   Component Value Date    PREALBUMIN 9 (L) 2019       Estimated Creatinine Clearance: 22.8 mL/min (A) (based on SCr of 3.9 mg/dL (H)).    Accu-Checks  Recent Labs     10/12/23  2149 10/13/23  0138 10/13/23  0748 10/13/23  1117 10/13/23  1356 10/13/23  1533 10/13/23  1817 10/13/23  1920 10/13/23  2036 10/14/23  0749   POCTGLUCOSE 207* 275* 284* 238* 234* 198* 352* 301* 229* 92       Current Medications and/or Treatments Impacting Glycemic Control  Immunotherapy:    Immunosuppressants       None          Steroids:   Hormones (From admission, onward)      Start     Stop Route Frequency Ordered    10/12/23 2327  melatonin tablet 6 mg         -- Oral Nightly PRN 10/12/23 2227          Pressors:    Autonomic Drugs (From " admission, onward)      None          Hyperglycemia/Diabetes Medications:   Antihyperglycemics (From admission, onward)      Start     Stop Route Frequency Ordered    10/13/23 2100  insulin detemir U-100 (Levemir) pen 25 Units         -- SubQ Nightly 10/13/23 1327    10/13/23 1700  insulin aspart U-100 pen 4-6 Units         -- SubQ 3 times daily with meals 10/13/23 1551    10/13/23 1650  insulin aspart U-100 pen 0-5 Units         -- SubQ Before meals & nightly PRN 10/13/23 1551

## 2023-10-14 NOTE — PROGRESS NOTES
[facility] ([place] [address])  Urology  Progress Note    Patient Name: Jian Arrieta  MRN: [id number]  Admission Date: 10/12/2023  Hospital Length of Stay: 2 days  Code Status: Full Code   Attending Provider: Sharla Duran MD   Primary Care Physician: Leon Barajas, DO    Subjective:     HPI:  Mr. Arrieta is a 69 yo male with MARIE (liver bx 3/2019 w/o cirrhosis), s/p sleeve gastrectomy, CAD s/p CABG, and CKD3 w/ hx of nephrolithiasis admitted to  from nephrology for worsening Cr. Urology consulted for right hydronephrosis.    History of R proximal stone s/p ureteroscopy in June 2021 with Dr. Diaz recently seen in clinic 10/9/23 with plans for an outpatient MAG3 scan vs cysto with R RPG. Recently presented to [facility] [place] with bilateral flank pain, Cr noted to be 2.56 from baseline of 2.2.. UA nonconcerning for infection and did not reflex to culture, however, he was discharged from ED with doxy for possible pyelonephritis. Seen in nephrology clinic yesterday with Cr elevated to 4.7 and [place] [facility] [place]    Upon assessment, afebrile resting comfortably.  Denies flank pain, hematuria.  Reports some RLQ pain on admission that has since resolved. Denies any changes in urination, feels like he is completely emptying his bladder.  Denies fevers, chills but does report one episode of night sweats a few days ago.       Labs 10/13/23: WBC 5.15 Hgb 11.4 Cr 4.5 (4.7, baseline approx 2.2) Glucose >500 in ED.  Calculated FeNa 8.    UA 10/12/23: 15 RBC, 11 WBC, rare bacteria, nitrite neg, no squams. Urine culture in process.     CTRSS 10/12/23: Mild right hydroureteronephrosis to the level of the pelvis, no left hydronephrosis, no urolithiasis bilaterally, bladder partially distended. Small right adrenal nodule approx 1.6 cm, 5-13 HU. Moderate stool burden. Left renal calcification called by nephrology appears to be arterial calcification.      Interval History: Denies any pain. Resting comfortably in  bed      Objective:     Temp:  [98 °F (36.7 °C)-98.2 °F (36.8 °C)] 98 °F (36.7 °C)  Pulse:  [72-89] 79  Resp:  [18] 18  SpO2:  [95 %-98 %] 96 %  BP: (122-176)/(63-84) 136/73     Body mass index is 43.7 kg/m².      Post Void Cath Residual Output (mL): 0 mL (10/13/23 1435)    Drains       Drain  Duration                  Ureteral Drain/Stent 06/16/21 0912 Right ureter 24 Fr. 849 days                     Physical Exam  Constitutional:       General: He is not in acute distress.  HENT:      Head: Normocephalic.   Eyes:      Extraocular Movements: Extraocular movements intact.   Cardiovascular:      Rate and Rhythm: Normal rate.   Pulmonary:      Effort: Pulmonary effort is normal. No respiratory distress.   Abdominal:      Palpations: Abdomen is soft.      Tenderness: There is no right CVA tenderness or left CVA tenderness.   Musculoskeletal:         General: No swelling or deformity.      Cervical back: Normal range of motion.   Skin:     General: Skin is warm and dry.   Neurological:      General: No focal deficit present.      Mental Status: He is alert.   Psychiatric:         Mood and Affect: Mood normal.         Behavior: Behavior normal.           Significant Labs:    BMP:  Recent Labs   Lab 10/12/23  1802 10/13/23  0526 10/14/23  0538   * 134* 141   K 5.7* 4.6 4.4    108 113*   CO2 19* 20* 20*   BUN 38* 37* 35*   CREATININE 4.7* 4.5* 3.7*   CALCIUM 8.2* 8.0* 7.9*       CBC:   Recent Labs   Lab 10/12/23  1231 10/12/23  1802 10/13/23  0526   WBC 5.82 5.54 5.15   HGB 12.3* 12.2* 11.4*   HCT 36.8* 35.3* 33.8*    166 147*       All pertinent labs results from the past 24 hours have been reviewed.    Significant Imaging:  All pertinent imaging results/findings from the past 24 hours have been reviewed.                    Assessment/Plan:     * Acute renal failure superimposed on stage 3 chronic kidney disease  Jian Arrieta is a 68 y.o. M with acute renal failure and mild right hydronephrosis.      -- Renal US showing mild right hydronephrosis.   -- Cr 3.7 from 4.5, baseline around 2.2.   -- No need for urgent stent placement in setting of improving kidney function.   -- IVFs as able per primary.  Patient with history of CHF, however echo yesterday showing good EF.   -- Recommend mIVFs.    -- can likely follow up with Dr. Diaz outpatient for MAG3.  -- Ok for diet from urology standpoint.   -- rest of care per primary           VTE Risk Mitigation (From admission, onward)           Ordered     heparin (porcine) injection 5,000 Units  Every 8 hours         10/12/23 2227     IP VTE HIGH RISK PATIENT  Once         10/12/23 2227     Place sequential compression device  Until discontinued         10/12/23 2227                    Flako Urrutia MD  Urology  [facility] - Intensive Care ([facility]-)

## 2023-10-14 NOTE — ASSESSMENT & PLAN NOTE
exam not consistent with cellulitis  Recent course of doxycycline as outpt.   Empiric clindamycin IV.- stopped  IVFs- one liter tonight

## 2023-10-14 NOTE — ASSESSMENT & PLAN NOTE
Jian Arrieta is a 68 y.o. M with acute renal failure and mild right hydronephrosis.     -- Renal US showing mild right hydronephrosis.   -- Cr 3.7 from 4.5, baseline around 2.2.   -- No need for urgent stent placement in setting of improving kidney function.   -- IVFs as able per primary.  Patient with history of CHF, however echo yesterday showing good EF.   -- Recommend mIVFs.    -- can likely follow up with Dr. Diaz outpatient for MAG3.  -- rest of care per primary

## 2023-10-14 NOTE — PROGRESS NOTES
"[facility] ([address] Medicine  Progress Note    Patient Name: Jian Arrieta  MRN: [id number]  Patient Class: IP- Inpatient   Admission Date: 10/12/2023  Length of Stay: 2 days  Attending Physician: Sharla Duran MD  Primary Care Provider: Leon Barajas DO      Subjective:     Principal Problem:Acute renal failure superimposed on stage 3 chronic kidney disease        HPI:  67 yo M with PMHx MARIE (liver bx 3/2019 w/o cirrhosis), hx of sleeve gastrectomy, CAD s/p CABG, and CKD3 w/ hx of obstructive nephrolithiasis who presents to the ED from nephrology clinic for worsening renal function for last 2 weeks. Pt. Baseline Cr ~2.2, but he recently presented to [facility] ED with B/L flank pain. Cr wasmildly elevated at 2.56 and CT renal stone revealed "mild fullness of the proximal R renal collecting system without evidence of obstructing stone and surrounding perinephric stranding." Pt. Was discharged from the ED with PO doxycycline to treat possible pyelonephritis although U/A was LE negative and not sent for culture. he obtained nephrology f/u yesterday, and on repeat labs Cr noted to be 4.7 and K+ 5.2., so he was referred to the ED. He reports some persistent flank pain, but it has improved. He denies any fevers, chills, nausea, or vomiting. No reported changes in urine output, dysuria, or hematuria. On ED arrival, pt. Noted to have  and pt. Reports that his omnipod Dash - Insulin pump must be malfunctioning, although he is not sure when it stopped working. Labs 3 days ago show normal BG.      Overview/Hospital Course:  10/13- admitted for uncontrollled diabetes, acute renal failure, obstructive nephropathy. Recently treated for pyelo w doxycycline. /79   Pulse 97  . Cr 4.9-> 4.5 BL 2.1.  -> 340. Received NS. UO- 300 cc.  One BM this am.  Nurse reports leg hot and red, photos and put in file - exam consistent w stasis dermatitis. AF. Started empiric clindamycin, " this am, and discontinued.  continue NS x one liter.  Urology to place stent in am.   Cxr - mild pulm edema  CT abd - Mild right hydronephrosis and right hydroureter with gradual tapering without evidence for ureteral calculus, these findings may relate to sequelae of recently passed calculus, clinical and historical correlation is needed.  Mild perinephric stranding bilaterally, nonspecific however correlation for UTI/pyelonephritis is needed.  Mild urinary bladder wall thickening may relate to incomplete distention however correlation for UTI/cystitis is needed.  Mild circumferential thickening of the distal descending colon/proximal sigmoid colon with mild pericolonic haziness, correlation for mild colitis to include mild diverticulitis is needed.  Right adrenal nodule likely representing adrenal adenoma.    10/14- appreciate Urology f/u - no need for stent because renal function improving. Renal US showing mild right hydronephrosis. Cr 4.5-> 3.7  baseline around 2.2. Continuing IVFs.  follow up with Urology- Dr. Diaz outpatient for MAG3. Resume diabetic diet. Endocrine following.  BS 92 this am.            Interval History: see above    Review of Systems   Constitutional:  Positive for activity change. Negative for appetite change and fever.   HENT:  Negative for trouble swallowing.    Respiratory:  Negative for cough and shortness of breath.    Cardiovascular:  Positive for leg swelling. Negative for chest pain.   Gastrointestinal:  Positive for abdominal distention. Negative for blood in stool, diarrhea, nausea and vomiting.   Genitourinary:  Negative for difficulty urinating.   Musculoskeletal:  Positive for back pain (falnks, bilat (PTA)). Negative for arthralgias, gait problem and neck stiffness.   Skin:  Positive for rash (chronicnLE erythema due to venous stasis).   Neurological:  Negative for headaches.   Psychiatric/Behavioral:  Negative for agitation, behavioral problems and confusion.      Objective:      Vital Signs (Most Recent):  Temp: 98 °F (36.7 °C) (10/14/23 0751)  Pulse: 82 (10/14/23 0751)  Resp: 18 (10/14/23 0751)  BP: 137/65 (10/14/23 0751)  SpO2: 96 % (10/14/23 0751) Vital Signs (24h Range):  Temp:  [98 °F (36.7 °C)-98.2 °F (36.8 °C)] 98 °F (36.7 °C)  Pulse:  [72-89] 82  Resp:  [18] 18  SpO2:  [95 %-98 %] 96 %  BP: (122-176)/(63-83) 137/65     Weight: 126.6 kg (279 lb)  Body mass index is 43.7 kg/m².    Intake/Output Summary (Last 24 hours) at 10/14/2023 0929  Last data filed at 10/13/2023 2200  Gross per 24 hour   Intake 440 ml   Output 320 ml   Net 120 ml         Physical Exam  Constitutional:       General: He is not in acute distress.     Appearance: Normal appearance. He is obese. He is not ill-appearing, toxic-appearing or diaphoretic.   HENT:      Head: Normocephalic and atraumatic.      Nose: Nose normal.      Comments: rhynophyma     Mouth/Throat:      Mouth: Mucous membranes are moist.      Pharynx: Oropharynx is clear.   Eyes:      General: No scleral icterus.     Extraocular Movements: Extraocular movements intact.      Conjunctiva/sclera: Conjunctivae normal.      Pupils: Pupils are equal, round, and reactive to light.   Cardiovascular:      Rate and Rhythm: Normal rate and regular rhythm.      Pulses: Normal pulses.      Heart sounds: Normal heart sounds.   Pulmonary:      Effort: Pulmonary effort is normal. No respiratory distress.      Breath sounds: Normal breath sounds. No stridor. No wheezing, rhonchi or rales.   Chest:      Chest wall: No tenderness.   Abdominal:      General: Abdomen is flat. Bowel sounds are normal. There is no distension.      Palpations: Abdomen is soft.      Tenderness: There is no abdominal tenderness. There is no right CVA tenderness, left CVA tenderness, guarding or rebound.   Musculoskeletal:         General: No swelling, tenderness, deformity or signs of injury. Normal range of motion.      Cervical back: Normal range of motion and neck supple. No rigidity  or tenderness.      Right lower leg: Edema present.      Left lower leg: Edema present.   Lymphadenopathy:      Cervical: No cervical adenopathy.   Skin:     General: Skin is warm and dry.      Coloration: Skin is not jaundiced.      Findings: Rash (venous stasis dermatists, not hot to touch) present. No erythema.   Neurological:      General: No focal deficit present.      Mental Status: He is alert and oriented to person, place, and time. Mental status is at baseline.      Motor: No weakness.      Coordination: Coordination normal.      Gait: Gait normal.   Psychiatric:         Mood and Affect: Mood normal.         Behavior: Behavior normal.         Thought Content: Thought content normal.         Judgment: Judgment normal.             Significant Labs: All pertinent labs within the past 24 hours have been reviewed.  CBC:   Recent Labs   Lab 10/12/23  1802 10/13/23  0526 10/14/23  0538   WBC 5.54 5.15 5.09   HGB 12.2* 11.4* 11.4*   HCT 35.3* 33.8* 33.2*    147* 160     CMP:   Recent Labs   Lab 10/12/23  1231 10/12/23  1802 10/13/23  0526 10/14/23  0538   * 132* 134* 141   K 5.2* 5.7* 4.6 4.4    104 108 113*   CO2 22* 19* 20* 20*   * 516* 340* 95   BUN 39* 38* 37* 35*   CREATININE 4.7* 4.7* 4.5* 3.7*   CALCIUM 8.3* 8.2* 8.0* 7.9*   PROT 6.5 6.7 5.5*  --    ALBUMIN 2.9* 2.8* 2.4*  --    BILITOT 0.4 0.3 0.2  --    ALKPHOS 160* 156* 131  --    AST 18 26 24  --    ALT 16 18 17  --    ANIONGAP 8 9 6* 8       Significant Imaging: I have reviewed all pertinent imaging results/findings within the past 24 hours.      Assessment/Plan:      * Acute renal failure superimposed on stage 3 chronic kidney disease  -Cr 4.7, baseline ~2.2. Imaging shows Mild right hydronephrosis and right hydroureter with gradual tapering and FENa 8% consistent with post-obstructive etiology. Urology consult for assistance  -IV fluids, avoid nephrotoxins and continue to trend Cr    10/13- Nephrology and urology were  consulted May be due to hypertensive emergency in the setting of chronic kidney disease.  Monitoring UO.- only 300 cc last night. Post void residual = 0. Had a loose stool.  Consider cardiorenal syndrome. Will get ECHO. Urology planning on stent placement in am.     10/14- continue IVFs today. ECHO was NL.  Treat BP.     Hypertensive emergency  221/94 upon admit  Hypertensive Emergency present on admission, with ARF  reports not taking any HTN medications. Plan to start scheduled amlodipine, hydralazine ordered PRN. Monitor BG and adjust as needed    10/13- /79 on norvasc. ->  176/83 . Prn hydralazine po is ordered   10/14- /65 received one dose of hydralazine yesterday    Type 2 diabetes mellitus with diabetic neuropathy, with long-term current use of insulin  -Pt. Hyperglycemic to >500 on presentation, suspect acute insulin pump malfunction as BG normal 3 days ago and A1c 7.6  -Levemir 20 units QHS and SSI ordered with diabetic/renal diet. Endocrinology consulted for assistance  -Has insulin pump, which is turned off at this time.    - Hypoglycemic protocol    10/13- BS improving. OR for cystoscopy and stent placement in am.  May attempt to place new pod and restart pump tomorrow.  Recent Labs     10/13/23  1356 10/13/23  1533 10/13/23  1817 10/13/23  1920 10/13/23  2036 10/14/23  0749   POCTGLUCOSE 234* 198* 352* 301* 229* 92     IVFs today, if continues to improve may dc tomorrow or Mionday    Chronic combined systolic and diastolic heart failure  Hx of EF 30 %  ECHO 6/19:   Mildly decreased left ventricular systolic function. The estimated ejection fraction is 45-50%   Left ventricular diastolic dysfunction.   Normal right ventricular systolic function.   Normal central venous pressure (3 mm Hg).   Extremely technically challenging study, poor endocardial visualization, would recommend repeating with contrast if additional information is desired.    10/13- dc IVF until echo returns and  Nephrology see pt.   ECHO 10/13:   Left Ventricle: The left ventricle is normal in size. Normal wall thickness. Septal motion is consistent with post-operative status. There is low normal systolic function with a visually estimated ejection fraction of 50 - 55%. There is normal diastolic function.    Right Ventricle: Normal right ventricular cavity size. Wall thickness is normal. Right ventricle wall motion  is normal. Systolic function is normal.    Pulmonary Artery: The estimated pulmonary artery systolic pressure is 21 mmHg.    IVC/SVC: Normal venous pressure at 3 mmHg.                    Venous stasis dermatitis of both lower extremities  exam not consistent with cellulitis  Recent course of doxycycline as outpt.   Empiric clindamycin IV.- stopped  IVFs- one liter tonight      Other cirrhosis of liver  -Chronic MARIE, no acute issues, compensated      Insulin pump status  Endocrine consulted  Pump is off.  Will attempt to place new pod and restart pump 10/14.      History of colon polyps  XT abd reveals thickened colon  F/u GI for colonoscopy  - only one stool yesterday      Class 3 obesity  daibetic diet      Chronic kidney disease, stage 3  BL cr 2.1      Hyperlipidemia associated with type 2 diabetes mellitus  Not on lipid therapy at home      Paroxysmal atrial fibrillation  Hx of  Not on anticoagulation      VTE Risk Mitigation (From admission, onward)         Ordered     heparin (porcine) injection 5,000 Units  Every 8 hours         10/12/23 2227     IP VTE HIGH RISK PATIENT  Once         10/12/23 2227     Place sequential compression device  Until discontinued         10/12/23 2227                Discharge Planning   CAMILLE: 10/16/2023     Code Status: Full Code   Is the patient medically ready for discharge?: No    Reason for patient still in hospital (select all that apply): Patient trending condition  Discharge Plan A: Home          Sharla Duran MD  Department of Hospital Medicine   [facility] -  Int[facility] ([place] [id number])

## 2023-10-14 NOTE — ASSESSMENT & PLAN NOTE
-Pt. Hyperglycemic to >500 on presentation, suspect acute insulin pump malfunction as BG normal 3 days ago and A1c 7.6  -Levemir 20 units QHS and SSI ordered with diabetic/renal diet. Endocrinology consulted for assistance  -Has insulin pump, which is turned off at this time.    - Hypoglycemic protocol    10/13- BS improving. OR for cystoscopy and stent placement in am.  May attempt to place new pod and restart pump tomorrow.  Recent Labs     10/13/23  1356 10/13/23  1533 10/13/23  1817 10/13/23  1920 10/13/23  2036 10/14/23  0749   POCTGLUCOSE 234* 198* 352* 301* 229* 92     IVFs today, if continues to improve may dc tomorrow or Mionday

## 2023-10-15 LAB
ALBUMIN SERPL BCP-MCNC: 2.3 G/DL (ref 3.5–5.2)
ALP SERPL-CCNC: 118 U/L (ref 55–135)
ALT SERPL W/O P-5'-P-CCNC: 18 U/L (ref 10–44)
ANION GAP SERPL CALC-SCNC: 7 MMOL/L (ref 8–16)
AST SERPL-CCNC: 24 U/L (ref 10–40)
BASOPHILS # BLD AUTO: 0.04 K/UL (ref 0–0.2)
BASOPHILS NFR BLD: 0.7 % (ref 0–1.9)
BILIRUB SERPL-MCNC: 0.2 MG/DL (ref 0.1–1)
BUN SERPL-MCNC: 31 MG/DL (ref 8–23)
CALCIUM SERPL-MCNC: 7.8 MG/DL (ref 8.7–10.5)
CHLORIDE SERPL-SCNC: 115 MMOL/L (ref 95–110)
CO2 SERPL-SCNC: 19 MMOL/L (ref 23–29)
CREAT SERPL-MCNC: 3.8 MG/DL (ref 0.5–1.4)
DIFFERENTIAL METHOD: ABNORMAL
EOSINOPHIL # BLD AUTO: 0.2 K/UL (ref 0–0.5)
EOSINOPHIL NFR BLD: 4.4 % (ref 0–8)
ERYTHROCYTE [DISTWIDTH] IN BLOOD BY AUTOMATED COUNT: 13.7 % (ref 11.5–14.5)
EST. GFR  (NO RACE VARIABLE): 16.4 ML/MIN/1.73 M^2
GLUCOSE SERPL-MCNC: 78 MG/DL (ref 70–110)
HCT VFR BLD AUTO: 36.3 % (ref 40–54)
HGB BLD-MCNC: 12 G/DL (ref 14–18)
IMM GRANULOCYTES # BLD AUTO: 0.01 K/UL (ref 0–0.04)
IMM GRANULOCYTES NFR BLD AUTO: 0.2 % (ref 0–0.5)
LYMPHOCYTES # BLD AUTO: 1.9 K/UL (ref 1–4.8)
LYMPHOCYTES NFR BLD: 34.6 % (ref 18–48)
MCH RBC QN AUTO: 30 PG (ref 27–31)
MCHC RBC AUTO-ENTMCNC: 33.1 G/DL (ref 32–36)
MCV RBC AUTO: 91 FL (ref 82–98)
MONOCYTES # BLD AUTO: 0.4 K/UL (ref 0.3–1)
MONOCYTES NFR BLD: 7.2 % (ref 4–15)
NEUTROPHILS # BLD AUTO: 2.9 K/UL (ref 1.8–7.7)
NEUTROPHILS NFR BLD: 52.9 % (ref 38–73)
NRBC BLD-RTO: 0 /100 WBC
PLATELET # BLD AUTO: 151 K/UL (ref 150–450)
PMV BLD AUTO: 11.1 FL (ref 9.2–12.9)
POCT GLUCOSE: 162 MG/DL (ref 70–110)
POCT GLUCOSE: 169 MG/DL (ref 70–110)
POCT GLUCOSE: 174 MG/DL (ref 70–110)
POCT GLUCOSE: 197 MG/DL (ref 70–110)
POCT GLUCOSE: 215 MG/DL (ref 70–110)
POCT GLUCOSE: 300 MG/DL (ref 70–110)
POCT GLUCOSE: 63 MG/DL (ref 70–110)
POTASSIUM SERPL-SCNC: 4.4 MMOL/L (ref 3.5–5.1)
PROT SERPL-MCNC: 5.5 G/DL (ref 6–8.4)
RBC # BLD AUTO: 4 M/UL (ref 4.6–6.2)
SODIUM SERPL-SCNC: 141 MMOL/L (ref 136–145)
WBC # BLD AUTO: 5.44 K/UL (ref 3.9–12.7)

## 2023-10-15 PROCEDURE — 99233 PR SUBSEQUENT HOSPITAL CARE,LEVL III: ICD-10-PCS | Mod: ,,, | Performed by: HOSPITALIST

## 2023-10-15 PROCEDURE — 63600175 PHARM REV CODE 636 W HCPCS: Performed by: HOSPITALIST

## 2023-10-15 PROCEDURE — 21400001 HC TELEMETRY ROOM

## 2023-10-15 PROCEDURE — 99232 PR SUBSEQUENT HOSPITAL CARE,LEVL II: ICD-10-PCS | Mod: ,,, | Performed by: NURSE PRACTITIONER

## 2023-10-15 PROCEDURE — 85025 COMPLETE CBC W/AUTO DIFF WBC: CPT | Performed by: HOSPITALIST

## 2023-10-15 PROCEDURE — 25000003 PHARM REV CODE 250

## 2023-10-15 PROCEDURE — 36415 COLL VENOUS BLD VENIPUNCTURE: CPT | Performed by: HOSPITALIST

## 2023-10-15 PROCEDURE — 25000003 PHARM REV CODE 250: Performed by: HOSPITALIST

## 2023-10-15 PROCEDURE — 99232 SBSQ HOSP IP/OBS MODERATE 35: CPT | Mod: ,,, | Performed by: NURSE PRACTITIONER

## 2023-10-15 PROCEDURE — 99233 SBSQ HOSP IP/OBS HIGH 50: CPT | Mod: ,,, | Performed by: HOSPITALIST

## 2023-10-15 PROCEDURE — 80053 COMPREHEN METABOLIC PANEL: CPT | Performed by: HOSPITALIST

## 2023-10-15 RX ORDER — INSULIN ASPART 100 [IU]/ML
5 INJECTION, SOLUTION INTRAVENOUS; SUBCUTANEOUS
Status: DISCONTINUED | OUTPATIENT
Start: 2023-10-15 | End: 2023-10-17 | Stop reason: HOSPADM

## 2023-10-15 RX ADMIN — HEPARIN SODIUM 5000 UNITS: 5000 INJECTION INTRAVENOUS; SUBCUTANEOUS at 05:10

## 2023-10-15 RX ADMIN — INSULIN ASPART 5 UNITS: 100 INJECTION, SOLUTION INTRAVENOUS; SUBCUTANEOUS at 12:10

## 2023-10-15 RX ADMIN — HEPARIN SODIUM 5000 UNITS: 5000 INJECTION INTRAVENOUS; SUBCUTANEOUS at 02:10

## 2023-10-15 RX ADMIN — AMLODIPINE BESYLATE 10 MG: 10 TABLET ORAL at 09:10

## 2023-10-15 RX ADMIN — INSULIN ASPART 1 UNITS: 100 INJECTION, SOLUTION INTRAVENOUS; SUBCUTANEOUS at 08:10

## 2023-10-15 RX ADMIN — INSULIN ASPART 5 UNITS: 100 INJECTION, SOLUTION INTRAVENOUS; SUBCUTANEOUS at 05:10

## 2023-10-15 RX ADMIN — SODIUM CHLORIDE: 9 INJECTION, SOLUTION INTRAVENOUS at 09:10

## 2023-10-15 RX ADMIN — INSULIN ASPART 3 UNITS: 100 INJECTION, SOLUTION INTRAVENOUS; SUBCUTANEOUS at 05:10

## 2023-10-15 RX ADMIN — TAMSULOSIN HYDROCHLORIDE 0.4 MG: 0.4 CAPSULE ORAL at 08:10

## 2023-10-15 RX ADMIN — HEPARIN SODIUM 5000 UNITS: 5000 INJECTION INTRAVENOUS; SUBCUTANEOUS at 10:10

## 2023-10-15 NOTE — ASSESSMENT & PLAN NOTE
-Cr 4.7, baseline ~2.2. Imaging shows Mild right hydronephrosis and right hydroureter with gradual tapering and FENa 8% consistent with post-obstructive etiology. Urology consult for assistance  -IV fluids, avoid nephrotoxins and continue to trend Cr    10/13- Nephrology and urology were consulted May be due to hypertensive emergency in the setting of chronic kidney disease.  Monitoring UO.- only 300 cc last night. Post void residual = 0. Had a loose stool.  Consider cardiorenal syndrome. Will get ECHO. Urology planning on stent placement in am.     10/14- continue IVFs today. ECHO was NL.  Treat BP.   10/15- UO= 1050, on  cc/ h. Cr 3.8- no change. Possible Ureter stent placement in am.

## 2023-10-15 NOTE — ASSESSMENT & PLAN NOTE
Lab Results   Component Value Date    CREATININE 3.8 (H) 10/15/2023     Avoid insulin stacking  Titrate insulin slowly

## 2023-10-15 NOTE — ASSESSMENT & PLAN NOTE
221/94 upon admit  Hypertensive Emergency present on admission, with ARF  reports not taking any HTN medications. Plan to start scheduled amlodipine, hydralazine ordered PRN. Monitor BG and adjust as needed    10/13- /79 on norvasc. ->  176/83 . Prn hydralazine po is ordered   10/14- /65 received one dose of hydralazine yesterday  10/15- not requiring prns. /76

## 2023-10-15 NOTE — PROGRESS NOTES
[facility] ([place] [address])  Endocrinology  Progress Note    Admit Date: 10/12/2023     Reason for Consult: Management of T1DM (ELOISE), Hyperglycemia     Surgical Procedure and Date: n/a    Diabetes diagnosis year: 7-8 years ago    diagnosed with T2DM originally in 2016 -   He has history of pancreatitis in 2018 - portal vein thrombosis and underwent angioplasty.   now treated as T1 - ELOISE -     Home Diabetes Medications:    Omnipod 5 - Lyumjev   Total daily dose is 38.4 units/day   75% is coming from basal rate.   25% is coming from bolusing.   Active insulin time is 3 hours -   12am - 1.1 units/hour  7am - 1.2 units/hour   Carb ratio - 1 gram/1 unit (he puts in boluses to eat).   He gives 6 or 8 units for a meal - small or large meal.   ISF at 40  Target 110       Lab Results   Component Value Date    HGBA1C 7.6 (H) 10/09/2023       How often checking glucose at home? Dexcom G6   BG readings on regimen:  > 400 the last few days   Hypoglycemia on the regimen?  No  Missed doses on regimen?  Yes, often forgets to bolus     Diabetes Complications include:     Hyperglycemia, Diabetic nephropathy  , Diabetic chronic kidney disease     , and Diabetic peripheral neuropathy     Complicating diabetes co morbidities:   Obesity, CAD s/p CABG, CKD3, MARIE       HPI:   Patient is a 68 y.o. male with a diagnosis of MARIE (liver bx 3/2019 w/o cirrhosis), s/p sleeve gastrectomy, CAD s/p CABG, CKD3 (BL creatinine 2.2), and history of obstructive nephrolithiasis who presents from nephrology clinic w/ worsening renal function x 2 weeks. Recent admission to  w/ nephrolithiasis of R kidney. Was discharged from  w/ doxycycline out of concern for pyelonephritis. In clinic, creatinine was noted to be significantly elevated to 4.7 prompting ED referral. On arrival, reported improving but persistent flank pain. Denies any fevers/chills/N/V. Denies changes in urine output, dysuria, or hematuria. Of note, blood glucose 516 on  "arrival. Endocrinology consulted for management of T1DM.         Interval HPI:   Overnight events: BG at and below goal ranges on current SQ insulin regimen. Creatinine 3.8. Diet diabetic 2000 Calorie  Diet NPO    Eatin%  Nausea: No  Hypoglycemia and intervention: No  Fever: No  TPN and/or TF: No  If yes, type of TF/TPN and rate: n/a    BP (!) 149/76   Pulse 74   Temp 98 °F (36.7 °C)   Resp 20   Ht 5' 7" (1.702 m)   Wt 126.6 kg (279 lb)   SpO2 98%   BMI 43.70 kg/m²     Labs Reviewed and Include    Recent Labs   Lab 10/15/23  0216   GLU 78   CALCIUM 7.8*   ALBUMIN 2.3*   PROT 5.5*      K 4.4   CO2 19*   *   BUN 31*   CREATININE 3.8*   ALKPHOS 118   ALT 18   AST 24   BILITOT 0.2     Lab Results   Component Value Date    WBC 5.44 10/15/2023    HGB 12.0 (L) 10/15/2023    HCT 36.3 (L) 10/15/2023    MCV 91 10/15/2023     10/15/2023     No results for input(s): "TSH", "FREET4" in the last 168 hours.  Lab Results   Component Value Date    HGBA1C 7.6 (H) 10/09/2023       Nutritional status:   Body mass index is 43.7 kg/m².  Lab Results   Component Value Date    ALBUMIN 2.3 (L) 10/15/2023    ALBUMIN 2.2 (L) 10/14/2023    ALBUMIN 2.4 (L) 10/13/2023     Lab Results   Component Value Date    PREALBUMIN 9 (L) 2019       Estimated Creatinine Clearance: 23.4 mL/min (A) (based on SCr of 3.8 mg/dL (H)).    Accu-Checks  Recent Labs     10/13/23  1817 10/13/23  1920 10/13/23  2036 10/14/23  0749 10/14/23  1140 10/14/23  1535 10/14/23  1957 10/14/23  2219 10/15/23  0420 10/15/23  0757   POCTGLUCOSE 352* 301* 229* 92 163* 189* 85 162* 63* 174*       Current Medications and/or Treatments Impacting Glycemic Control  Immunotherapy:    Immunosuppressants       None          Steroids:   Hormones (From admission, onward)      Start     Stop Route Frequency Ordered    10/12/23 2327  melatonin tablet 6 mg         -- Oral Nightly PRN 10/12/23 2227          Pressors:    Autonomic Drugs (From admission, " onward)      None          Hyperglycemia/Diabetes Medications:   Antihyperglycemics (From admission, onward)      Start     Stop Route Frequency Ordered    10/13/23 2100  insulin detemir U-100 (Levemir) pen 25 Units         -- SubQ Nightly 10/13/23 1327    10/13/23 1700  insulin aspart U-100 pen 4-6 Units         -- SubQ 3 times daily with meals 10/13/23 1551    10/13/23 1650  insulin aspart U-100 pen 0-5 Units         -- SubQ Before meals & nightly PRN 10/13/23 1551            ASSESSMENT and PLAN    Cardiac/Vascular  Hyperlipidemia associated with type 2 diabetes mellitus  May increase insulin resistance.         Renal/  * Acute renal failure superimposed on stage 3 chronic kidney disease  Lab Results   Component Value Date    CREATININE 3.8 (H) 10/15/2023     Avoid insulin stacking  Titrate insulin slowly       Endocrine  Class 3 obesity  Body mass index is 43.7 kg/m².  May increase insulin resistance.         Insulin pump status  Off at this time.     Will attempt to place new pod and restart pump tomorrow.         Type 2 diabetes mellitus with diabetic neuropathy, with long-term current use of insulin  BG goal 140-180    Noted to have  on arrival and patient stating that his insulin pump malfunctioned and he is not sure how long it has been off. States the problem is his pod is not communicating with the pump.     Plan:  -Decrease Levemir to 18 units q HS (30% dose reduction; hypoglycemia noted this morning)   -Decrease Novolog to 5  units TID with meals (once diet progresses)  -Low Dose Correction Scale  -BG monitoring ac/hs/0200    Leave insulin pump off at this time.     Discharge plans: TBD  Will attempt to retry placing insulin pump on prior to discharge. If unable to fix the malfunction, patient will need to discharge on MDI regimen until follow up with Ivanna Braxton and contact with Omnipod.   Lab Results   Component Value Date    HGBA1C 7.6 (H) 10/09/2023               Lauren Ibarra,  NP  Endocrinology  [facility] ([place]

## 2023-10-15 NOTE — ASSESSMENT & PLAN NOTE
-Pt. Hyperglycemic to >500 on presentation, suspect acute insulin pump malfunction as BG normal 3 days ago and A1c 7.6  -Levemir 20 units QHS and SSI ordered with diabetic/renal diet. Endocrinology consulted for assistance  -Has insulin pump, which is turned off at this time.    - Hypoglycemic protocol    10/13- BS improving. OR for cystoscopy and stent placement in am.  May attempt to place new pod and restart pump tomorrow.  Recent Labs     10/14/23  1140 10/14/23  1535 10/14/23  1957 10/14/23  2219 10/15/23  0420 10/15/23  0757   POCTGLUCOSE 163* 189* 85 162* 63* 174*     IVFs today, if continues to improve may dc tomorrow or Mionday

## 2023-10-15 NOTE — ASSESSMENT & PLAN NOTE
Jian Arrieta is a 68 y.o. M with acute renal failure and mild right hydronephrosis.     -- Renal US showing mild right hydronephrosis.   -- Cr 3.8 from 3.9 from 4.5, baseline around 2.2.   -- NPO at midnight for possible stent tomorrow.   -- IVFs as able per primary.  Patient with history of CHF, however echo yesterday showing good EF.   -- Continue IVFs.   -- rest of care per primary

## 2023-10-15 NOTE — ASSESSMENT & PLAN NOTE
BG goal 140-180    Noted to have  on arrival and patient stating that his insulin pump malfunctioned and he is not sure how long it has been off. States the problem is his pod is not communicating with the pump.     Plan:  -Decrease Levemir to 18 units q HS (30% dose reduction; hypoglycemia noted this morning)   -Decrease Novolog to 5  units TID with meals (once diet progresses)  -Low Dose Correction Scale  -BG monitoring ac/hs/0200    Leave insulin pump off at this time.     Discharge plans: TBD  Will attempt to retry placing insulin pump on prior to discharge. If unable to fix the malfunction, patient will need to discharge on MDI regimen until follow up with Ivanna Braxton and contact with Omnipod.   Lab Results   Component Value Date    HGBA1C 7.6 (H) 10/09/2023

## 2023-10-15 NOTE — PLAN OF CARE
Problem: Adult Inpatient Plan of Care  Goal: Plan of Care Review  Outcome: Ongoing, Progressing     Problem: Adult Inpatient Plan of Care  Goal: Patient-Specific Goal (Individualized)  Outcome: Ongoing, Progressing     Problem: Adult Inpatient Plan of Care  Goal: Absence of Hospital-Acquired Illness or Injury  Outcome: Ongoing, Progressing     Problem: Diabetes Comorbidity  Goal: Blood Glucose Level Within Targeted Range  Outcome: Ongoing, Progressing     Problem: Renal Function Impairment (Acute Kidney Injury/Impairment)  Goal: Effective Renal Function  Outcome: Ongoing, Progressing

## 2023-10-15 NOTE — PROGRESS NOTES
"[facility] - [facility] ([address] Medicine  Progress Note    Patient Name: Jian Arrieta  MRN: [id number]  Patient Class: IP- Inpatient   Admission Date: 10/12/2023  Length of Stay: 3 days  Attending Physician: Sharla Duran MD  Primary Care Provider: Leon Barajas DO        Subjective:     Principal Problem:Acute renal failure superimposed on stage 3 chronic kidney disease        HPI:  69 yo M with PMHx MARIE (liver bx 3/2019 w/o cirrhosis), hx of sleeve gastrectomy, CAD s/p CABG, and CKD3 w/ hx of obstructive nephrolithiasis who presents to the ED from nephrology clinic for worsening renal function for last 2 weeks. Pt. Baseline Cr ~2.2, but he recently presented to [facility] ED with B/L flank pain. Cr wasmildly elevated at 2.56 and CT renal stone revealed "mild fullness of the proximal R renal collecting system without evidence of obstructing stone and surrounding perinephric stranding." Pt. Was discharged from the ED with PO doxycycline to treat possible pyelonephritis although U/A was LE negative and not sent for culture. he obtained nephrology f/u yesterday, and on repeat labs Cr noted to be 4.7 and K+ 5.2., so he was referred to the ED. He reports some persistent flank pain, but it has improved. He denies any fevers, chills, nausea, or vomiting. No reported changes in urine output, dysuria, or hematuria. On ED arrival, pt. Noted to have  and pt. Reports that his omnipod Dash - Insulin pump must be malfunctioning, although he is not sure when it stopped working. Labs 3 days ago show normal BG.      Overview/Hospital Course:  10/13- admitted for uncontrollled diabetes, acute renal failure, obstructive nephropathy. Recently treated for pyelo w doxycycline. /79   Pulse 97  . Cr 4.9-> 4.5 BL 2.1.  -> 340. Received NS. UO- 300 cc.  One BM this am.  Nurse reports leg hot and red, photos and put in file - exam consistent w stasis dermatitis. AF. Started empiric clindamycin, " this am, and discontinued.  continue NS x one liter.  Urology to place stent in am.   Cxr - mild pulm edema  CT abd - Mild right hydronephrosis and right hydroureter with gradual tapering without evidence for ureteral calculus, these findings may relate to sequelae of recently passed calculus, clinical and historical correlation is needed.  Mild perinephric stranding bilaterally, nonspecific however correlation for UTI/pyelonephritis is needed.  Mild urinary bladder wall thickening may relate to incomplete distention however correlation for UTI/cystitis is needed.  Mild circumferential thickening of the distal descending colon/proximal sigmoid colon with mild pericolonic haziness, correlation for mild colitis to include mild diverticulitis is needed.  Right adrenal nodule likely representing adrenal adenoma.    10/14- appreciate Urology f/u - no need for stent because renal function improving. Renal US showing mild right hydronephrosis. Cr 4.5-> 3.7  baseline around 2.2. Continuing IVFs.  follow up with Urology- Dr. Diaz outpatient for MAG3. Resume diabetic diet. Endocrine following.  BS 92 this am.    10/15- Cr 3.8 from 3.9 from 4.5, baseline around 2.2.  NPO at midnight for possible stent tomorrow. Appreciate Urology f/u.             Interval History: see above    Review of Systems   Constitutional:  Positive for activity change. Negative for appetite change and fever.   HENT:  Negative for trouble swallowing.    Respiratory:  Negative for cough and shortness of breath.    Cardiovascular:  Positive for leg swelling. Negative for chest pain.   Gastrointestinal:  Positive for abdominal distention. Negative for blood in stool, diarrhea, nausea and vomiting.   Genitourinary:  Negative for difficulty urinating.   Musculoskeletal:  Negative for arthralgias, gait problem and neck stiffness. Back pain: falnks, bilat (PTA).  Skin:  Positive for rash (chronicnLE erythema due to venous stasis).   Neurological:  Negative for  dizziness and headaches.   Psychiatric/Behavioral:  Negative for agitation, behavioral problems and confusion.      Objective:     Vital Signs (Most Recent):  Temp: 98 °F (36.7 °C) (10/15/23 0758)  Pulse: 74 (10/15/23 0758)  Resp: 20 (10/15/23 0758)  BP: (!) 149/76 (10/15/23 0758)  SpO2: 98 % (10/15/23 0758) Vital Signs (24h Range):  Temp:  [98 °F (36.7 °C)-98.5 °F (36.9 °C)] 98 °F (36.7 °C)  Pulse:  [74-84] 74  Resp:  [18-20] 20  SpO2:  [96 %-98 %] 98 %  BP: (135-178)/(71-98) 149/76     Weight: 126.6 kg (279 lb)  Body mass index is 43.7 kg/m².    Intake/Output Summary (Last 24 hours) at 10/15/2023 0918  Last data filed at 10/15/2023 0534  Gross per 24 hour   Intake 610 ml   Output 1050 ml   Net -440 ml           Physical Exam  Constitutional:       General: He is not in acute distress.     Appearance: Normal appearance. He is obese. He is not ill-appearing, toxic-appearing or diaphoretic.   HENT:      Head: Normocephalic and atraumatic.      Nose: Nose normal.      Comments: rhynophyma     Mouth/Throat:      Mouth: Mucous membranes are moist.      Pharynx: Oropharynx is clear.   Eyes:      General: No scleral icterus.     Extraocular Movements: Extraocular movements intact.      Conjunctiva/sclera: Conjunctivae normal.      Pupils: Pupils are equal, round, and reactive to light.   Cardiovascular:      Rate and Rhythm: Normal rate and regular rhythm.      Pulses: Normal pulses.      Heart sounds: Normal heart sounds.   Pulmonary:      Effort: Pulmonary effort is normal. No respiratory distress.      Breath sounds: Normal breath sounds. No stridor. No wheezing, rhonchi or rales.   Chest:      Chest wall: No tenderness.   Abdominal:      General: Abdomen is flat. Bowel sounds are normal. There is no distension.      Palpations: Abdomen is soft.      Tenderness: There is no abdominal tenderness. There is no right CVA tenderness, left CVA tenderness, guarding or rebound.   Musculoskeletal:         General: Swelling  (chronic LE) present. No tenderness, deformity or signs of injury. Normal range of motion.      Cervical back: Normal range of motion and neck supple. No rigidity or tenderness.      Right lower leg: Edema present.      Left lower leg: Edema present.   Lymphadenopathy:      Cervical: No cervical adenopathy.   Skin:     General: Skin is warm and dry.      Coloration: Skin is not jaundiced.      Findings: Rash (venous stasis dermatists, not hot to touch) present. No erythema.   Neurological:      General: No focal deficit present.      Mental Status: He is alert and oriented to person, place, and time. Mental status is at baseline.      Motor: No weakness.      Coordination: Coordination normal.      Gait: Gait normal.   Psychiatric:         Mood and Affect: Mood normal.         Behavior: Behavior normal.         Thought Content: Thought content normal.         Judgment: Judgment normal.             Significant Labs: All pertinent labs within the past 24 hours have been reviewed.  CBC:   Recent Labs   Lab 10/14/23  0538 10/15/23  0216   WBC 5.09 5.44   HGB 11.4* 12.0*   HCT 33.2* 36.3*    151       CMP:   Recent Labs   Lab 10/14/23  0538 10/15/23  0216     141 141   K 4.2  4.4 4.4   *  113* 115*   CO2 18*  20* 19*     95 78   BUN 35*  35* 31*   CREATININE 3.9*  3.7* 3.8*   CALCIUM 7.9*  7.9* 7.8*   PROT 5.2* 5.5*   ALBUMIN 2.2* 2.3*   BILITOT 0.3 0.2   ALKPHOS 114 118   AST 20 24   ALT 15 18   ANIONGAP 9  8 7*         Significant Imaging: I have reviewed all pertinent imaging results/findings within the past 24 hours.      Assessment/Plan:      * Acute renal failure superimposed on stage 3 chronic kidney disease  -Cr 4.7, baseline ~2.2. Imaging shows Mild right hydronephrosis and right hydroureter with gradual tapering and FENa 8% consistent with post-obstructive etiology. Urology consult for assistance  -IV fluids, avoid nephrotoxins and continue to trend Cr    10/13- Nephrology and  urology were consulted May be due to hypertensive emergency in the setting of chronic kidney disease.  Monitoring UO.- only 300 cc last night. Post void residual = 0. Had a loose stool.  Consider cardiorenal syndrome. Will get ECHO. Urology planning on stent placement in am.     10/14- continue IVFs today. ECHO was NL.  Treat BP.   10/15- UO= 1050, on  cc/ h. Cr 3.8- no change. Possible Ureter stent placement in am.     Hypertensive emergency  221/94 upon admit  Hypertensive Emergency present on admission, with ARF  reports not taking any HTN medications. Plan to start scheduled amlodipine, hydralazine ordered PRN. Monitor BG and adjust as needed    10/13- /79 on norvasc. ->  176/83 . Prn hydralazine po is ordered   10/14- /65 received one dose of hydralazine yesterday  10/15- not requiring prns. /76     Type 2 diabetes mellitus with diabetic neuropathy, with long-term current use of insulin  -Pt. Hyperglycemic to >500 on presentation, suspect acute insulin pump malfunction as BG normal 3 days ago and A1c 7.6  -Levemir 20 units QHS and SSI ordered with diabetic/renal diet. Endocrinology consulted for assistance  -Has insulin pump, which is turned off at this time.    - Hypoglycemic protocol    10/13- BS improving. OR for cystoscopy and stent placement in am.  May attempt to place new pod and restart pump tomorrow.  Recent Labs     10/14/23  1140 10/14/23  1535 10/14/23  1957 10/14/23  2219 10/15/23  0420 10/15/23  0757   POCTGLUCOSE 163* 189* 85 162* 63* 174*     IVFs today, if continues to improve may dc tomorrow or Mionday    Chronic combined systolic and diastolic heart failure  Hx of EF 30 %  ECHO 6/19:   Mildly decreased left ventricular systolic function. The estimated ejection fraction is 45-50%   Left ventricular diastolic dysfunction.   Normal right ventricular systolic function.   Normal central venous pressure (3 mm Hg).   Extremely technically challenging study, poor  endocardial visualization, would recommend repeating with contrast if additional information is desired.    10/13- dc IVF until echo returns and Nephrology see pt.   ECHO 10/13:   Left Ventricle: The left ventricle is normal in size. Normal wall thickness. Septal motion is consistent with post-operative status. There is low normal systolic function with a visually estimated ejection fraction of 50 - 55%. There is normal diastolic function.    Right Ventricle: Normal right ventricular cavity size. Wall thickness is normal. Right ventricle wall motion  is normal. Systolic function is normal.    Pulmonary Artery: The estimated pulmonary artery systolic pressure is 21 mmHg.    IVC/SVC: Normal venous pressure at 3 mmHg.    10/15- STABLE, On NS 125cc/h    Venous stasis dermatitis of both lower extremities  exam not consistent with cellulitis  Recent course of doxycycline as outpt.   Empiric clindamycin IV.- stopped  IVFs- one liter tonight  10/15- still on IVFs, cr 3.8.       Other cirrhosis of liver  -Chronic MARIE, no acute issues, compensated      Insulin pump status  Endocrine consulted  Pump is off.  Will attempt to place new pod and restart pump 10/14.      History of colon polyps  XT abd reveals thickened colon  F/u GI for colonoscopy  - only one stool yesterday      Class 3 obesity  daibetic diet      Chronic kidney disease, stage 3  BL cr 2.1      Hyperlipidemia associated with type 2 diabetes mellitus  Not on lipid therapy at home      Paroxysmal atrial fibrillation  Hx of  Not on anticoagulation    VTE Risk Mitigation (From admission, onward)         Ordered     heparin (porcine) injection 5,000 Units  Every 8 hours         10/12/23 2227     IP VTE HIGH RISK PATIENT  Once         10/12/23 2227     Place sequential compression device  Until discontinued         10/12/23 2227                Discharge Planning   [place]IL[place]: 10/16/2023     Code Status: Full Code   Is the patient medically ready for discharge?: No     Reason for patient still in hospital (select all that apply): Patient trending condition  Discharge Plan A: Home                  Sharla Duran MD  Department of Hospital Medicine   [facility] - Intensive Care ([place]-)

## 2023-10-15 NOTE — SUBJECTIVE & OBJECTIVE
Interval History: Denies any pain. Resting comfortably in bed. Urinating freely.       Objective:     Temp:  [98 °F (36.7 °C)-98.5 °F (36.9 °C)] 98 °F (36.7 °C)  Pulse:  [74-84] 74  Resp:  [18-20] 20  SpO2:  [96 %-98 %] 98 %  BP: (135-178)/(71-98) 149/76     Body mass index is 43.7 kg/m².      Post Void Cath Residual Output (mL): 0 mL (10/13/23 1435)    Drains       Drain  Duration                  Ureteral Drain/Stent 06/16/21 0912 Right ureter 24 Fr. 849 days                     Physical Exam  Constitutional:       General: He is not in acute distress.  HENT:      Head: Normocephalic.   Eyes:      Extraocular Movements: Extraocular movements intact.   Cardiovascular:      Rate and Rhythm: Normal rate.   Pulmonary:      Effort: Pulmonary effort is normal. No respiratory distress.   Abdominal:      Palpations: Abdomen is soft.      Tenderness: There is no right CVA tenderness or left CVA tenderness.   Musculoskeletal:         General: No swelling or deformity.      Cervical back: Normal range of motion.   Skin:     General: Skin is warm and dry.   Neurological:      General: No focal deficit present.      Mental Status: He is alert.   Psychiatric:         Mood and Affect: Mood normal.         Behavior: Behavior normal.           Significant Labs:    BMP:  Recent Labs   Lab 10/13/23  0526 10/14/23  0538 10/15/23  0216   * 141  141 141   K 4.6 4.2  4.4 4.4    114*  113* 115*   CO2 20* 18*  20* 19*   BUN 37* 35*  35* 31*   CREATININE 4.5* 3.9*  3.7* 3.8*   CALCIUM 8.0* 7.9*  7.9* 7.8*         CBC:   Recent Labs   Lab 10/13/23  0526 10/14/23  0538 10/15/23  0216   WBC 5.15 5.09 5.44   HGB 11.4* 11.4* 12.0*   HCT 33.8* 33.2* 36.3*   * 160 151         All pertinent labs results from the past 24 hours have been reviewed.    Significant Imaging:  All pertinent imaging results/findings from the past 24 hours have been reviewed.

## 2023-10-15 NOTE — SUBJECTIVE & OBJECTIVE
Interval History: see above    Review of Systems   Constitutional:  Positive for activity change. Negative for appetite change and fever.   HENT:  Negative for trouble swallowing.    Respiratory:  Negative for cough and shortness of breath.    Cardiovascular:  Positive for leg swelling. Negative for chest pain.   Gastrointestinal:  Positive for abdominal distention. Negative for blood in stool, diarrhea, nausea and vomiting.   Genitourinary:  Negative for difficulty urinating.   Musculoskeletal:  Negative for arthralgias, gait problem and neck stiffness. Back pain: falnks, bilat (PTA).  Skin:  Positive for rash (chronicnLE erythema due to venous stasis).   Neurological:  Negative for dizziness and headaches.   Psychiatric/Behavioral:  Negative for agitation, behavioral problems and confusion.      Objective:     Vital Signs (Most Recent):  Temp: 98 °F (36.7 °C) (10/15/23 0758)  Pulse: 74 (10/15/23 0758)  Resp: 20 (10/15/23 0758)  BP: (!) 149/76 (10/15/23 0758)  SpO2: 98 % (10/15/23 0758) Vital Signs (24h Range):  Temp:  [98 °F (36.7 °C)-98.5 °F (36.9 °C)] 98 °F (36.7 °C)  Pulse:  [74-84] 74  Resp:  [18-20] 20  SpO2:  [96 %-98 %] 98 %  BP: (135-178)/(71-98) 149/76     Weight: 126.6 kg (279 lb)  Body mass index is 43.7 kg/m².    Intake/Output Summary (Last 24 hours) at 10/15/2023 0918  Last data filed at 10/15/2023 0534  Gross per 24 hour   Intake 610 ml   Output 1050 ml   Net -440 ml           Physical Exam  Constitutional:       General: He is not in acute distress.     Appearance: Normal appearance. He is obese. He is not ill-appearing, toxic-appearing or diaphoretic.   HENT:      Head: Normocephalic and atraumatic.      Nose: Nose normal.      Comments: rhynophyma     Mouth/Throat:      Mouth: Mucous membranes are moist.      Pharynx: Oropharynx is clear.   Eyes:      General: No scleral icterus.     Extraocular Movements: Extraocular movements intact.      Conjunctiva/sclera: Conjunctivae normal.      Pupils:  Pupils are equal, round, and reactive to light.   Cardiovascular:      Rate and Rhythm: Normal rate and regular rhythm.      Pulses: Normal pulses.      Heart sounds: Normal heart sounds.   Pulmonary:      Effort: Pulmonary effort is normal. No respiratory distress.      Breath sounds: Normal breath sounds. No stridor. No wheezing, rhonchi or rales.   Chest:      Chest wall: No tenderness.   Abdominal:      General: Abdomen is flat. Bowel sounds are normal. There is no distension.      Palpations: Abdomen is soft.      Tenderness: There is no abdominal tenderness. There is no right CVA tenderness, left CVA tenderness, guarding or rebound.   Musculoskeletal:         General: Swelling (chronic LE) present. No tenderness, deformity or signs of injury. Normal range of motion.      Cervical back: Normal range of motion and neck supple. No rigidity or tenderness.      Right lower leg: Edema present.      Left lower leg: Edema present.   Lymphadenopathy:      Cervical: No cervical adenopathy.   Skin:     General: Skin is warm and dry.      Coloration: Skin is not jaundiced.      Findings: Rash (venous stasis dermatists, not hot to touch) present. No erythema.   Neurological:      General: No focal deficit present.      Mental Status: He is alert and oriented to person, place, and time. Mental status is at baseline.      Motor: No weakness.      Coordination: Coordination normal.      Gait: Gait normal.   Psychiatric:         Mood and Affect: Mood normal.         Behavior: Behavior normal.         Thought Content: Thought content normal.         Judgment: Judgment normal.             Significant Labs: All pertinent labs within the past 24 hours have been reviewed.  CBC:   Recent Labs   Lab 10/14/23  0538 10/15/23  0216   WBC 5.09 5.44   HGB 11.4* 12.0*   HCT 33.2* 36.3*    151       CMP:   Recent Labs   Lab 10/14/23  0538 10/15/23  0216     141 141   K 4.2  4.4 4.4   *  113* 115*   CO2 18*  20* 19*      95 78   BUN 35*  35* 31*   CREATININE 3.9*  3.7* 3.8*   CALCIUM 7.9*  7.9* 7.8*   PROT 5.2* 5.5*   ALBUMIN 2.2* 2.3*   BILITOT 0.3 0.2   ALKPHOS 114 118   AST 20 24   ALT 15 18   ANIONGAP 9  8 7*         Significant Imaging: I have reviewed all pertinent imaging results/findings within the past 24 hours.

## 2023-10-15 NOTE — SUBJECTIVE & OBJECTIVE
"Interval HPI:   Overnight events: BG at and below goal ranges on current SQ insulin regimen. Creatinine 3.8. Diet diabetic  Calorie  Diet NPO    Eatin%  Nausea: No  Hypoglycemia and intervention: No  Fever: No  TPN and/or TF: No  If yes, type of TF/TPN and rate: n/a    BP (!) 149/76   Pulse 74   Temp 98 °F (36.7 °C)   Resp 20   Ht 5' 7" (1.702 m)   Wt 126.6 kg (279 lb)   SpO2 98%   BMI 43.70 kg/m²     Labs Reviewed and Include    Recent Labs   Lab 10/15/23  0216   GLU 78   CALCIUM 7.8*   ALBUMIN 2.3*   PROT 5.5*      K 4.4   CO2 19*   *   BUN 31*   CREATININE 3.8*   ALKPHOS 118   ALT 18   AST 24   BILITOT 0.2     Lab Results   Component Value Date    WBC 5.44 10/15/2023    HGB 12.0 (L) 10/15/2023    HCT 36.3 (L) 10/15/2023    MCV 91 10/15/2023     10/15/2023     No results for input(s): "TSH", "FREET4" in the last 168 hours.  Lab Results   Component Value Date    HGBA1C 7.6 (H) 10/09/2023       Nutritional status:   Body mass index is 43.7 kg/m².  Lab Results   Component Value Date    ALBUMIN 2.3 (L) 10/15/2023    ALBUMIN 2.2 (L) 10/14/2023    ALBUMIN 2.4 (L) 10/13/2023     Lab Results   Component Value Date    PREALBUMIN 9 (L) 2019       Estimated Creatinine Clearance: 23.4 mL/min (A) (based on SCr of 3.8 mg/dL (H)).    Accu-Checks  Recent Labs     10/13/23  1817 10/13/23  1920 10/13/23  2036 10/14/23  0749 10/14/23  1140 10/14/23  1535 10/14/23  1957 10/14/23  2219 10/15/23  0420 10/15/23  0757   POCTGLUCOSE 352* 301* 229* 92 163* 189* 85 162* 63* 174*       Current Medications and/or Treatments Impacting Glycemic Control  Immunotherapy:    Immunosuppressants       None          Steroids:   Hormones (From admission, onward)      Start     Stop Route Frequency Ordered    10/12/23 2327  melatonin tablet 6 mg         -- Oral Nightly PRN 10/12/23 2227          Pressors:    Autonomic Drugs (From admission, onward)      None          Hyperglycemia/Diabetes Medications: "   Antihyperglycemics (From admission, onward)      Start     Stop Route Frequency Ordered    10/13/23 2100  insulin detemir U-100 (Levemir) pen 25 Units         -- SubQ Nightly 10/13/23 1327    10/13/23 1700  insulin aspart U-100 pen 4-6 Units         -- SubQ 3 times daily with meals 10/13/23 1551    10/13/23 1650  insulin aspart U-100 pen 0-5 Units         -- SubQ Before meals & nightly PRN 10/13/23 1551

## 2023-10-15 NOTE — PROGRESS NOTES
[facility] ([place] [address])  Urology  Progress Note    Patient Name: Jian Arrieta  MRN: [id number]  Admission Date: 10/12/2023  Hospital Length of Stay: 3 days  Code Status: Full Code   Attending Provider: Sharla Duran MD   Primary Care Physician: Leon Barajas, DO    Subjective:     HPI:  Mr. Arrieta is a 67 yo male with MARIE (liver bx 3/2019 w/o cirrhosis), s/p sleeve gastrectomy, CAD s/p CABG, and CKD3 w/ hx of nephrolithiasis admitted to  from nephrology for worsening Cr. Urology consulted for right hydronephrosis.    History of R proximal stone s/p ureteroscopy in June 2021 with Dr. Diaz recently seen in clinic 10/9/23 with plans for an outpatient MAG3 scan vs cysto with R RPG. Recently presented to [facility] with bilateral flank pain, Cr noted to be 2.56 from baseline of 2.2.. UA nonconcerning for infection and did not reflex to culture, however, he was discharged from ED with doxy for possible pyelonephritis. Seen in nephrology clinic yesterday with Cr elevated to 4.7 and sent to [facility] ED.    Upon assessment, afebrile resting comfortably.  Denies flank pain, hematuria.  Reports some RLQ pain on admission that has since resolved. Denies any changes in urination, feels like he is completely emptying his bladder.  Denies fevers, chills but does report one episode of night sweats a few days ago.       Labs 10/13/23: WBC 5.15 Hgb 11.4 Cr 4.5 (4.7, baseline approx 2.2) Glucose >500 in ED.  Calculated FeNa 8.    UA 10/12/23: 15 RBC, 11 WBC, rare bacteria, nitrite neg, no squams. Urine culture in process.     CTRSS 10/12/23: Mild right hydroureteronephrosis to the level of the pelvis, no left hydronephrosis, no urolithiasis bilaterally, bladder partially distended. Small right adrenal nodule approx 1.6 cm, 5-13 HU. Moderate stool burden. Left renal calcification called by nephrology appears to be arterial calcification.      Interval History: Denies any pain. Resting comfortably in bed. Urinating  freely.       Objective:     Temp:  [98 °F (36.7 °C)-98.5 °F (36.9 °C)] 98 °F (36.7 °C)  Pulse:  [74-84] 74  Resp:  [18-20] 20  SpO2:  [96 %-98 %] 98 %  BP: (135-178)/(71-98) 149/76     Body mass index is 43.7 kg/m².      Post Void Cath Residual Output (mL): 0 mL (10/13/23 1435)    Drains       Drain  Duration                  Ureteral Drain/Stent 06/16/21 0912 Right ureter 24 Fr. 849 days                     Physical Exam  Constitutional:       General: He is not in acute distress.  HENT:      Head: Normocephalic.   Eyes:      Extraocular Movements: Extraocular movements intact.   Cardiovascular:      Rate and Rhythm: Normal rate.   Pulmonary:      Effort: Pulmonary effort is normal. No respiratory distress.   Abdominal:      Palpations: Abdomen is soft.      Tenderness: There is no right CVA tenderness or left CVA tenderness.   Musculoskeletal:         General: No swelling or deformity.      Cervical back: Normal range of motion.   Skin:     General: Skin is warm and dry.   Neurological:      General: No focal deficit present.      Mental Status: He is alert.   Psychiatric:         Mood and Affect: Mood normal.         Behavior: Behavior normal.           Significant Labs:    BMP:  Recent Labs   Lab 10/13/23  0526 10/14/23  0538 10/15/23  0216   * 141  141 141   K 4.6 4.2  4.4 4.4    114*  113* 115*   CO2 20* 18*  20* 19*   BUN 37* 35*  35* 31*   CREATININE 4.5* 3.9*  3.7* 3.8*   CALCIUM 8.0* 7.9*  7.9* 7.8*         CBC:   Recent Labs   Lab 10/13/23  0526 10/14/23  0538 10/15/23  0216   WBC 5.15 5.09 5.44   HGB 11.4* 11.4* 12.0*   HCT 33.8* 33.2* 36.3*   * 160 151         All pertinent labs results from the past 24 hours have been reviewed.    Significant Imaging:  All pertinent imaging results/findings from the past 24 hours have been reviewed.                      Assessment/Plan:     * Acute renal failure superimposed on stage 3 chronic kidney disease  Jian Arrieta is a 68 y.o. M  with acute renal failure and mild right hydronephrosis.     -- Renal US showing mild right hydronephrosis.   -- Cr 3.8 from 3.9 from 4.5, baseline around 2.2.   -- NPO at midnight for possible stent tomorrow.   -- IVFs as able per primary.  Patient with history of CHF, however echo yesterday showing good EF.   -- Continue IVFs.   -- rest of care per primary           VTE Risk Mitigation (From admission, onward)         Ordered     heparin (porcine) injection 5,000 Units  Every 8 hours         10/12/23 2227     IP VTE HIGH RISK PATIENT  Once         10/12/23 2227     Place sequential compression device  Until discontinued         10/12/23 2227                Flako Urrutia MD  Urology  [facility] - Intensive Care ([place])

## 2023-10-15 NOTE — ASSESSMENT & PLAN NOTE
exam not consistent with cellulitis  Recent course of doxycycline as outpt.   Empiric clindamycin IV.- stopped  IVFs- one liter tonight  10/15- still on IVFs, cr 3.8.

## 2023-10-15 NOTE — PLAN OF CARE
Problem: Adult Inpatient Plan of Care  Goal: Plan of Care Review  Outcome: Ongoing, Progressing     Problem: Adult Inpatient Plan of Care  Goal: Patient-Specific Goal (Individualized)  Outcome: Ongoing, Progressing     Problem: Fluid and Electrolyte Imbalance (Acute Kidney Injury/Impairment)  Goal: Fluid and Electrolyte Balance  Outcome: Ongoing, Progressing     Problem: Oral Intake Inadequate (Acute Kidney Injury/Impairment)  Goal: Optimal Nutrition Intake  Outcome: Ongoing, Progressing     Problem: Renal Function Impairment (Acute Kidney Injury/Impairment)  Goal: Effective Renal Function  Outcome: Ongoing, Progressing

## 2023-10-16 ENCOUNTER — ANESTHESIA EVENT (OUTPATIENT)
Dept: SURGERY | Facility: HOSPITAL | Age: 68
DRG: 660 | End: 2023-10-16
Payer: MEDICARE

## 2023-10-16 ENCOUNTER — ANESTHESIA (OUTPATIENT)
Dept: SURGERY | Facility: HOSPITAL | Age: 68
DRG: 660 | End: 2023-10-16
Payer: MEDICARE

## 2023-10-16 PROBLEM — E11.21 DIABETIC NEPHROPATHY ASSOCIATED WITH TYPE 2 DIABETES MELLITUS: Status: ACTIVE | Noted: 2023-10-16

## 2023-10-16 PROBLEM — R80.9 NEPHROTIC RANGE PROTEINURIA: Status: ACTIVE | Noted: 2023-10-16

## 2023-10-16 PROBLEM — N13.30 HYDRONEPHROSIS: Status: ACTIVE | Noted: 2023-10-16

## 2023-10-16 PROBLEM — N18.31 ACUTE RENAL FAILURE SUPERIMPOSED ON STAGE 3A CHRONIC KIDNEY DISEASE: Status: ACTIVE | Noted: 2023-10-13

## 2023-10-16 LAB
ALDOST SERPL-MCNC: 13.4 NG/DL
ANION GAP SERPL CALC-SCNC: 7 MMOL/L (ref 8–16)
BASOPHILS # BLD AUTO: 0.04 K/UL (ref 0–0.2)
BASOPHILS NFR BLD: 0.7 % (ref 0–1.9)
BUN SERPL-MCNC: 28 MG/DL (ref 8–23)
CALCIUM SERPL-MCNC: 7.7 MG/DL (ref 8.7–10.5)
CHLORIDE SERPL-SCNC: 117 MMOL/L (ref 95–110)
CO2 SERPL-SCNC: 17 MMOL/L (ref 23–29)
CREAT SERPL-MCNC: 3.5 MG/DL (ref 0.5–1.4)
DIFFERENTIAL METHOD: ABNORMAL
EOSINOPHIL # BLD AUTO: 0.3 K/UL (ref 0–0.5)
EOSINOPHIL NFR BLD: 4.3 % (ref 0–8)
ERYTHROCYTE [DISTWIDTH] IN BLOOD BY AUTOMATED COUNT: 13.7 % (ref 11.5–14.5)
EST. GFR  (NO RACE VARIABLE): 18.1 ML/MIN/1.73 M^2
GLUCOSE SERPL-MCNC: 83 MG/DL (ref 70–110)
GRAM STN SPEC: NORMAL
GRAM STN SPEC: NORMAL
HCT VFR BLD AUTO: 39 % (ref 40–54)
HGB BLD-MCNC: 12.3 G/DL (ref 14–18)
IMM GRANULOCYTES # BLD AUTO: 0.02 K/UL (ref 0–0.04)
IMM GRANULOCYTES NFR BLD AUTO: 0.3 % (ref 0–0.5)
LYMPHOCYTES # BLD AUTO: 1.9 K/UL (ref 1–4.8)
LYMPHOCYTES NFR BLD: 32.5 % (ref 18–48)
MCH RBC QN AUTO: 29.4 PG (ref 27–31)
MCHC RBC AUTO-ENTMCNC: 31.5 G/DL (ref 32–36)
MCV RBC AUTO: 93 FL (ref 82–98)
MONOCYTES # BLD AUTO: 0.5 K/UL (ref 0.3–1)
MONOCYTES NFR BLD: 7.7 % (ref 4–15)
NEUTROPHILS # BLD AUTO: 3.2 K/UL (ref 1.8–7.7)
NEUTROPHILS NFR BLD: 54.5 % (ref 38–73)
NRBC BLD-RTO: 0 /100 WBC
PLATELET # BLD AUTO: 133 K/UL (ref 150–450)
PMV BLD AUTO: 10.2 FL (ref 9.2–12.9)
POCT GLUCOSE: 198 MG/DL (ref 70–110)
POCT GLUCOSE: 82 MG/DL (ref 70–110)
POCT GLUCOSE: 88 MG/DL (ref 70–110)
POCT GLUCOSE: 93 MG/DL (ref 70–110)
POTASSIUM SERPL-SCNC: 4.3 MMOL/L (ref 3.5–5.1)
RBC # BLD AUTO: 4.18 M/UL (ref 4.6–6.2)
SODIUM SERPL-SCNC: 141 MMOL/L (ref 136–145)
WBC # BLD AUTO: 5.82 K/UL (ref 3.9–12.7)

## 2023-10-16 PROCEDURE — C2617 STENT, NON-COR, TEM W/O DEL: HCPCS | Performed by: UROLOGY

## 2023-10-16 PROCEDURE — 25000003 PHARM REV CODE 250: Performed by: HOSPITALIST

## 2023-10-16 PROCEDURE — 63600175 PHARM REV CODE 636 W HCPCS: Performed by: NURSE ANESTHETIST, CERTIFIED REGISTERED

## 2023-10-16 PROCEDURE — D9220A PRA ANESTHESIA: ICD-10-PCS | Mod: CRNA,,, | Performed by: NURSE ANESTHETIST, CERTIFIED REGISTERED

## 2023-10-16 PROCEDURE — 52351 PR CYSTO/URETERO/PYELOSCOPY, DX: ICD-10-PCS | Mod: ,,, | Performed by: UROLOGY

## 2023-10-16 PROCEDURE — 52351 CYSTOURETERO & OR PYELOSCOPE: CPT | Mod: ,,, | Performed by: UROLOGY

## 2023-10-16 PROCEDURE — 37000008 HC ANESTHESIA 1ST 15 MINUTES: Performed by: UROLOGY

## 2023-10-16 PROCEDURE — 25000003 PHARM REV CODE 250: Performed by: NURSE ANESTHETIST, CERTIFIED REGISTERED

## 2023-10-16 PROCEDURE — D9220A PRA ANESTHESIA: ICD-10-PCS | Mod: ANES,,, | Performed by: STUDENT IN AN ORGANIZED HEALTH CARE EDUCATION/TRAINING PROGRAM

## 2023-10-16 PROCEDURE — 80053 COMPREHEN METABOLIC PANEL: CPT | Performed by: HOSPITALIST

## 2023-10-16 PROCEDURE — 71000015 HC POSTOP RECOV 1ST HR: Performed by: UROLOGY

## 2023-10-16 PROCEDURE — D9220A PRA ANESTHESIA: Mod: ANES,,, | Performed by: STUDENT IN AN ORGANIZED HEALTH CARE EDUCATION/TRAINING PROGRAM

## 2023-10-16 PROCEDURE — 74420 PR  X-RAY RETROGRADE PYELOGRAM: ICD-10-PCS | Mod: 26,,, | Performed by: UROLOGY

## 2023-10-16 PROCEDURE — 74420 UROGRAPHY RTRGR +-KUB: CPT | Mod: 26,,, | Performed by: UROLOGY

## 2023-10-16 PROCEDURE — 80048 BASIC METABOLIC PNL TOTAL CA: CPT

## 2023-10-16 PROCEDURE — C1769 GUIDE WIRE: HCPCS | Performed by: UROLOGY

## 2023-10-16 PROCEDURE — 99233 SBSQ HOSP IP/OBS HIGH 50: CPT | Mod: ,,, | Performed by: HOSPITALIST

## 2023-10-16 PROCEDURE — 99232 SBSQ HOSP IP/OBS MODERATE 35: CPT | Mod: ,,, | Performed by: INTERNAL MEDICINE

## 2023-10-16 PROCEDURE — 52332 CYSTOSCOPY AND TREATMENT: CPT | Mod: 51,RT,, | Performed by: UROLOGY

## 2023-10-16 PROCEDURE — D9220A PRA ANESTHESIA: Mod: CRNA,,, | Performed by: NURSE ANESTHETIST, CERTIFIED REGISTERED

## 2023-10-16 PROCEDURE — 25000003 PHARM REV CODE 250: Performed by: UROLOGY

## 2023-10-16 PROCEDURE — 37000009 HC ANESTHESIA EA ADD 15 MINS: Performed by: UROLOGY

## 2023-10-16 PROCEDURE — 82962 GLUCOSE BLOOD TEST: CPT | Performed by: UROLOGY

## 2023-10-16 PROCEDURE — 99232 PR SUBSEQUENT HOSPITAL CARE,LEVL II: ICD-10-PCS | Mod: ,,, | Performed by: INTERNAL MEDICINE

## 2023-10-16 PROCEDURE — 63600175 PHARM REV CODE 636 W HCPCS: Performed by: UROLOGY

## 2023-10-16 PROCEDURE — 85025 COMPLETE CBC W/AUTO DIFF WBC: CPT | Performed by: HOSPITALIST

## 2023-10-16 PROCEDURE — 36415 COLL VENOUS BLD VENIPUNCTURE: CPT

## 2023-10-16 PROCEDURE — 87205 SMEAR GRAM STAIN: CPT | Performed by: HOSPITALIST

## 2023-10-16 PROCEDURE — 71000044 HC DOSC ROUTINE RECOVERY FIRST HOUR: Performed by: UROLOGY

## 2023-10-16 PROCEDURE — 36000706: Performed by: UROLOGY

## 2023-10-16 PROCEDURE — 25500020 PHARM REV CODE 255: Performed by: UROLOGY

## 2023-10-16 PROCEDURE — 21400001 HC TELEMETRY ROOM

## 2023-10-16 PROCEDURE — 25000003 PHARM REV CODE 250

## 2023-10-16 PROCEDURE — 87086 URINE CULTURE/COLONY COUNT: CPT | Performed by: HOSPITALIST

## 2023-10-16 PROCEDURE — 99233 PR SUBSEQUENT HOSPITAL CARE,LEVL III: ICD-10-PCS | Mod: ,,, | Performed by: HOSPITALIST

## 2023-10-16 PROCEDURE — 36000707: Performed by: UROLOGY

## 2023-10-16 PROCEDURE — 52332 PR CYSTOSCOPY,INSERT URETERAL STENT: ICD-10-PCS | Mod: 51,RT,, | Performed by: UROLOGY

## 2023-10-16 PROCEDURE — 36415 COLL VENOUS BLD VENIPUNCTURE: CPT | Performed by: HOSPITALIST

## 2023-10-16 PROCEDURE — 63600175 PHARM REV CODE 636 W HCPCS: Performed by: HOSPITALIST

## 2023-10-16 PROCEDURE — C1758 CATHETER, URETERAL: HCPCS | Performed by: UROLOGY

## 2023-10-16 DEVICE — STENT URETERAL UNIV 6FR 26CM
Type: IMPLANTABLE DEVICE | Site: URETER | Status: NON-FUNCTIONAL
Removed: 2023-12-06

## 2023-10-16 RX ORDER — PROPOFOL 10 MG/ML
VIAL (ML) INTRAVENOUS CONTINUOUS PRN
Status: DISCONTINUED | OUTPATIENT
Start: 2023-10-16 | End: 2023-10-16

## 2023-10-16 RX ORDER — ONDANSETRON 2 MG/ML
INJECTION INTRAMUSCULAR; INTRAVENOUS
Status: DISCONTINUED | OUTPATIENT
Start: 2023-10-16 | End: 2023-10-16

## 2023-10-16 RX ORDER — DEXMEDETOMIDINE HYDROCHLORIDE 100 UG/ML
INJECTION, SOLUTION INTRAVENOUS
Status: DISCONTINUED | OUTPATIENT
Start: 2023-10-16 | End: 2023-10-16

## 2023-10-16 RX ORDER — KETAMINE HCL IN 0.9 % NACL 50 MG/5 ML
SYRINGE (ML) INTRAVENOUS
Status: DISCONTINUED | OUTPATIENT
Start: 2023-10-16 | End: 2023-10-16

## 2023-10-16 RX ORDER — LIDOCAINE HYDROCHLORIDE 20 MG/ML
JELLY TOPICAL
Status: DISCONTINUED | OUTPATIENT
Start: 2023-10-16 | End: 2023-10-16 | Stop reason: HOSPADM

## 2023-10-16 RX ORDER — CEFAZOLIN SODIUM 1 G/3ML
INJECTION, POWDER, FOR SOLUTION INTRAMUSCULAR; INTRAVENOUS
Status: DISCONTINUED | OUTPATIENT
Start: 2023-10-16 | End: 2023-10-16

## 2023-10-16 RX ORDER — PHENYLEPHRINE HYDROCHLORIDE 10 MG/ML
INJECTION INTRAVENOUS
Status: DISCONTINUED | OUTPATIENT
Start: 2023-10-16 | End: 2023-10-16

## 2023-10-16 RX ORDER — FENTANYL CITRATE 50 UG/ML
25 INJECTION, SOLUTION INTRAMUSCULAR; INTRAVENOUS EVERY 5 MIN PRN
Status: DISCONTINUED | OUTPATIENT
Start: 2023-10-16 | End: 2023-10-17

## 2023-10-16 RX ORDER — LIDOCAINE HYDROCHLORIDE 20 MG/ML
INJECTION INTRAVENOUS
Status: DISCONTINUED | OUTPATIENT
Start: 2023-10-16 | End: 2023-10-16

## 2023-10-16 RX ADMIN — TAMSULOSIN HYDROCHLORIDE 0.4 MG: 0.4 CAPSULE ORAL at 08:10

## 2023-10-16 RX ADMIN — SODIUM CHLORIDE: 0.9 INJECTION, SOLUTION INTRAVENOUS at 10:10

## 2023-10-16 RX ADMIN — DEXMEDETOMIDINE 8 MCG: 100 INJECTION, SOLUTION, CONCENTRATE INTRAVENOUS at 10:10

## 2023-10-16 RX ADMIN — ONDANSETRON 4 MG: 2 INJECTION INTRAMUSCULAR; INTRAVENOUS at 10:10

## 2023-10-16 RX ADMIN — HEPARIN SODIUM 5000 UNITS: 5000 INJECTION INTRAVENOUS; SUBCUTANEOUS at 05:10

## 2023-10-16 RX ADMIN — PHENYLEPHRINE HYDROCHLORIDE 200 MCG: 10 INJECTION INTRAVENOUS at 11:10

## 2023-10-16 RX ADMIN — PROPOFOL 100 MCG/KG/MIN: 10 INJECTION, EMULSION INTRAVENOUS at 11:10

## 2023-10-16 RX ADMIN — HEPARIN SODIUM 5000 UNITS: 5000 INJECTION INTRAVENOUS; SUBCUTANEOUS at 01:10

## 2023-10-16 RX ADMIN — HYDRALAZINE HYDROCHLORIDE 50 MG: 25 TABLET, FILM COATED ORAL at 04:10

## 2023-10-16 RX ADMIN — PROPOFOL 20 MG: 10 INJECTION, EMULSION INTRAVENOUS at 11:10

## 2023-10-16 RX ADMIN — HYDRALAZINE HYDROCHLORIDE 50 MG: 25 TABLET, FILM COATED ORAL at 03:10

## 2023-10-16 RX ADMIN — ONDANSETRON 4 MG: 2 INJECTION INTRAMUSCULAR; INTRAVENOUS at 09:10

## 2023-10-16 RX ADMIN — CEFAZOLIN 3 G: 330 INJECTION, POWDER, FOR SOLUTION INTRAMUSCULAR; INTRAVENOUS at 11:10

## 2023-10-16 RX ADMIN — SODIUM CHLORIDE: 9 INJECTION, SOLUTION INTRAVENOUS at 09:10

## 2023-10-16 RX ADMIN — GLYCOPYRROLATE 0.2 MG: 0.2 INJECTION, SOLUTION INTRAMUSCULAR; INTRAVENOUS at 10:10

## 2023-10-16 RX ADMIN — Medication 6 MG: at 08:10

## 2023-10-16 RX ADMIN — LIDOCAINE HYDROCHLORIDE 50 MG: 20 INJECTION INTRAVENOUS at 11:10

## 2023-10-16 RX ADMIN — Medication 50 MG: at 11:10

## 2023-10-16 RX ADMIN — HEPARIN SODIUM 5000 UNITS: 5000 INJECTION INTRAVENOUS; SUBCUTANEOUS at 09:10

## 2023-10-16 RX ADMIN — HYDRALAZINE HYDROCHLORIDE 50 MG: 25 TABLET, FILM COATED ORAL at 08:10

## 2023-10-16 NOTE — ASSESSMENT & PLAN NOTE
-Pt. Hyperglycemic to >500 on presentation, suspect acute insulin pump malfunction as BG normal 3 days ago and A1c 7.6  -Levemir 20 units QHS and SSI ordered with diabetic/renal diet. Endocrinology consulted for assistance  -Has insulin pump, which is turned off at this time.    - Hypoglycemic protocol    10/13- BS improving. OR for cystoscopy and stent placement in am.  May attempt to place new pod and restart pump tomorrow.  Recent Labs     10/15/23  0757 10/15/23  1140 10/15/23  1717 10/15/23  1953 10/15/23  2333 10/16/23  0748   POCTGLUCOSE 174* 169* 300* 215* 197* 82     IVFs today, if continues to improve may dc tomorrow or Mionday

## 2023-10-16 NOTE — SUBJECTIVE & OBJECTIVE
Interval History: see above    Review of Systems   Constitutional:  Positive for activity change. Negative for appetite change and fever.   HENT:  Negative for trouble swallowing.    Respiratory:  Negative for cough and shortness of breath.    Cardiovascular:  Positive for leg swelling. Negative for chest pain.   Gastrointestinal:  Positive for abdominal distention. Negative for blood in stool, diarrhea, nausea and vomiting.   Genitourinary:  Negative for difficulty urinating.   Musculoskeletal:  Negative for arthralgias, gait problem and neck stiffness. Back pain: falnks, bilat (PTA).  Skin:  Positive for rash (chronicnLE erythema due to venous stasis).   Neurological:  Negative for dizziness and headaches.   Psychiatric/Behavioral:  Negative for agitation, behavioral problems and confusion.      Objective:     Vital Signs (Most Recent):  Temp: 98 °F (36.7 °C) (10/16/23 0750)  Pulse: 86 (10/16/23 0750)  Resp: 18 (10/16/23 0750)  BP: 131/62 (10/16/23 0750)  SpO2: 98 % (10/16/23 0750) Vital Signs (24h Range):  Temp:  [97.5 °F (36.4 °C)-98.6 °F (37 °C)] 98 °F (36.7 °C)  Pulse:  [80-86] 86  Resp:  [16-20] 18  SpO2:  [96 %-98 %] 98 %  BP: (131-189)/(62-88) 131/62     Weight: 126.6 kg (279 lb)  Body mass index is 43.7 kg/m².    Intake/Output Summary (Last 24 hours) at 10/16/2023 0908  Last data filed at 10/15/2023 1730  Gross per 24 hour   Intake 800 ml   Output --   Net 800 ml           Physical Exam  Constitutional:       General: He is not in acute distress.     Appearance: Normal appearance. He is obese. He is not ill-appearing, toxic-appearing or diaphoretic.   HENT:      Head: Normocephalic and atraumatic.      Nose: Nose normal.      Comments: rhynophyma     Mouth/Throat:      Mouth: Mucous membranes are moist.      Pharynx: Oropharynx is clear.   Eyes:      General: No scleral icterus.     Extraocular Movements: Extraocular movements intact.      Conjunctiva/sclera: Conjunctivae normal.      Pupils: Pupils are  equal, round, and reactive to light.   Cardiovascular:      Rate and Rhythm: Normal rate and regular rhythm.      Pulses: Normal pulses.      Heart sounds: Normal heart sounds.   Pulmonary:      Effort: Pulmonary effort is normal. No respiratory distress.      Breath sounds: Normal breath sounds. No stridor. No wheezing, rhonchi or rales.   Chest:      Chest wall: No tenderness.   Abdominal:      General: Abdomen is flat. Bowel sounds are normal. There is no distension.      Palpations: Abdomen is soft.      Tenderness: There is no abdominal tenderness. There is no right CVA tenderness, left CVA tenderness, guarding or rebound.   Musculoskeletal:         General: Swelling (chronic LE) present. No tenderness, deformity or signs of injury. Normal range of motion.      Cervical back: Normal range of motion and neck supple. No rigidity or tenderness.      Right lower leg: Edema present.      Left lower leg: Edema present.   Lymphadenopathy:      Cervical: No cervical adenopathy.   Skin:     General: Skin is warm and dry.      Coloration: Skin is not jaundiced.      Findings: Rash (venous stasis dermatists, not hot to touch) present. No erythema.   Neurological:      General: No focal deficit present.      Mental Status: He is alert and oriented to person, place, and time. Mental status is at baseline.      Motor: No weakness.      Coordination: Coordination normal.      Gait: Gait normal.   Psychiatric:         Mood and Affect: Mood normal.         Behavior: Behavior normal.         Thought Content: Thought content normal.         Judgment: Judgment normal.             Significant Labs: All pertinent labs within the past 24 hours have been reviewed.  CBC:   Recent Labs   Lab 10/15/23  0216   WBC 5.44   HGB 12.0*   HCT 36.3*          CMP:   Recent Labs   Lab 10/15/23  0216      K 4.4   *   CO2 19*   GLU 78   BUN 31*   CREATININE 3.8*   CALCIUM 7.8*   PROT 5.5*   ALBUMIN 2.3*   BILITOT 0.2   ALKPHOS 118    AST 24   ALT 18   ANIONGAP 7*         Significant Imaging: I have reviewed all pertinent imaging results/findings within the past 24 hours.

## 2023-10-16 NOTE — CARE UPDATE
BG goal 140-180    Patient in OR at time of rounds. BG variable on current SQ insulin regimen. (). Creatinine 3.5.       Plan:  -Levemir 18 units q HS   - Novolog 5  units TID with meals (once diet progresses)  -Low Dose Correction Scale  -BG monitoring ac/hs/0200    Discharge plans: TBD  Patient with pump malfunction at this time. Recommend MDI regimen until patient can contact [employer] and follow up with Ivanna Braxton.  -Lantus (or insurance preferred basal) 18 units q HS   -Novolog (or insurance preferred prandial) 4-6 units TID with meals (4 units with a small meal and 6 units with large meal)           Lab Results   Component Value Date     HGBA1C 7.6 (H) 10/09/2023

## 2023-10-16 NOTE — ANESTHESIA PREPROCEDURE EVALUATION
Pre-operative evaluation for Procedure(s) (LRB):  CYSTOSCOPY (N/A)    Jian Arrieta is a 68 y.o. male PMHx MARIE (liver bx 3/2019 w/o cirrhosis), hx of sleeve gastrectomy, CAD s/p CABG, and CKD3 w/ hx of obstructive nephrolithiasis who was admitted from nephrology clinic with worsening renal function for last 2 weeks. He now presents for the above procedure.       Prev airway (6/30/21):     Induction:  Intravenous    Intubated:  Postinduction    Mask Ventilation:  Easy with oral airway    Attempts:  1    Attempted By:  Student    Method of Intubation:  Video laryngoscopy    Blade:  Butcher 3    Laryngeal View Grade: Grade I - full view of chords      Difficult Airway Encountered?: No      Complications:  None    Airway Device Size:  7.5    Style/Cuff Inflation:  Cuffed (inflated to minimal occlusive pressure)    Placement Verified By:  Capnometry    Findings Post-Intubation:  BS equal bilateral    EKG (10/12/23):   Vent. Rate : 077 BPM     Atrial Rate : 077 BPM      P-R Int : 182 ms          QRS Dur : 114 ms       QT Int : 434 ms       P-R-T Axes : 061 -57 081 degrees      QTc Int : 491 ms     Normal sinus rhythm   Left axis deviation   Nonspecific T wave abnormality   Abnormal ECG     2D Echo (10/13/2023):   Left Ventricle: The left ventricle is normal in size. Normal wall thickness. Septal motion is consistent with post-operative status. There is low normal systolic function with a visually estimated ejection fraction of 50 - 55%. There is normal diastolic function.    Right Ventricle: Normal right ventricular cavity size. Wall thickness is normal. Right ventricle wall motion  is normal. Systolic function is normal.    Pulmonary Artery: The estimated pulmonary artery systolic pressure is 21 mmHg.    IVC/SVC: Normal venous pressure at 3 mmHg.      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), managed as type 1     Sleep apnea    Diabetic polyneuropathy associated with type 2 diabetes mellitus    Status post bariatric surgery    Onychomycosis of multiple toenails with type 2 diabetes mellitus and peripheral neuropathy    Aortic atherosclerosis    Other chronic pancreatitis    Pseudocyst of pancreas    Chronic combined systolic and diastolic heart failure    Lymphedema of both lower extremities    Type 2 diabetes mellitus with diabetic neuropathy, with long-term current use of insulin    Hypoalbuminemia    Other ascites    Anemia    Paroxysmal atrial fibrillation    Hypertensive emergency    Hyperlipidemia associated with type 2 diabetes mellitus    Obesity hypoventilation syndrome    Hepatic fibrosis    Portal hypertension due to obstruction of extrahepatic portal vein    Gastritis    Chronic kidney disease, stage 3    Medically noncompliant    Type 2 diabetes mellitus with stage 3 chronic kidney disease, with long-term current use of insulin    Current use of long term anticoagulation    Other cirrhosis of liver    Diabetes mellitus due to underlying condition, uncontrolled, with renal complication    Atherosclerosis of aorta    Class 3 obesity    Hx of CABG    Vitamin D deficiency    Obstruction of right ureteropelvic junction (UPJ) due to stone    Bilateral hydrocele    Uric acid kidney stone    Hematoma of scrotum    Ureteral stone    History of colon polyps    Other nonthrombocytopenic purpura    Venous insufficiency of both lower extremities    Pseudomonas aeruginosa infection    MSSA infection, non-invasive    Diabetes mellitus type 1, with complication, on long term insulin pump    Right flank pain    Hematuria, microscopic    Acute renal failure superimposed on stage 3 chronic kidney disease    Venous stasis dermatitis of both lower extremities    Insulin pump status       Review of patient's allergies indicates:  No Known Allergies     No current facility-administered  "medications on file prior to encounter.     Current Outpatient Medications on File Prior to Encounter   Medication Sig Dispense Refill    doxycycline (VIBRAMYCIN) 100 MG Cap Take 1 capsule (100 mg total) by mouth 2 (two) times daily. for 14 doses 14 capsule 0    insulin lispro-aabc (LYUMJEV U-100 INSULIN) 100 unit/mL Inject 80 Units into the skin continuous. USES IN OMNIPOD INSULIN PUMP. USE AS DIRECTED. DO NOT DIRECTLY INJECT. 30 mL 6    tamsulosin (FLOMAX) 0.4 mg Cap Take 1 capsule (0.4 mg total) by mouth every evening. 90 capsule 3    aspirin (ECOTRIN) 81 MG EC tablet Take 1 tablet (81 mg total) by mouth once daily. 9 tablet 0    blood-glucose sensor (DEXCOM G6 SENSOR) Sandi Inject 1 each into the skin every 10 days. 3 each 11    blood-glucose transmitter (DEXCOM G6 TRANSMITTER) Sandi Inject 1 each into the skin every 10 days. 1 each 3    glucagon (BAQSIMI) 3 mg/actuation Spry 1 each by Nasal route daily as needed (if glucose is less than 70 - can repeat in 1 hour if still low/less than 70). 2 each 3    HUMALOG U-100 INSULIN 100 unit/mL injection       insulin glargine, TOUJEO, (TOUJEO SOLOSTAR U-300 INSULIN) 300 unit/mL (1.5 mL) InPn pen Inject 30 Units into the skin once daily. Can increase dose by 2 units higher to get to goal fasting glucose 100 - 150 - for max TDD 60 units. Takes daily IN AN EMERGENCY only if OFF OF insulin pump. 15 mL 1    insulin pump cart,automated,BT (OMNIPOD 5 G6 PODS, GEN 5,) Crtg Inject 1 each into the skin every other day. Use with omnipod 5 pdm as directed. 15 each 11    pen needle, diabetic 32 gauge x 5/32" Ndle Use with toujeo insulin one daily only as Emergency use/back up insulin - if OFF OF your insulin pump. 30 each 3       Past Surgical History:   Procedure Laterality Date    ANGIOGRAPHY N/A 6/28/2019    Procedure: ANGIOGRAM-PV STENT;  Surgeon: Ewa Diagnostic Provider;  Location: [facility] OR;  Service: Radiology;  Laterality: N/A;    ANGIOPLASTY      total x5 stents "    COLONOSCOPY N/A 10/6/2015    Procedure: COLONOSCOPY;  Surgeon: Shekhar Richards MD;  Location: [facility] (2ND FLR);  Service: Endoscopy;  Laterality: N/A;  BMI over 55/2nd floor case    5 day hold Plavix, Dr Kwadwo Arroyo    COLONOSCOPY N/A 5/13/2021    Procedure: COLONOSCOPY;  Surgeon: Huan Brumfield MD;  Location: [facility];  Service: Endoscopy;  Laterality: N/A;    CORONARY ANGIOGRAPHY Right 3/20/2019    Procedure: ANGIOGRAM, CORONARY ARTERY;  Surgeon: Bob Duque MD;  Location: [facility] CATH LAB;  Service: Cardiology;  Laterality: Right;    CORONARY ARTERY BYPASS GRAFT  2017    x3    CORONARY BYPASS GRAFT ANGIOGRAPHY  3/20/2019    Procedure: Bypass graft study;  Surgeon: Bob Duque MD;  Location: [facility] CATH LAB;  Service: Cardiology;;    CYST REMOVAL      CYSTOSCOPY Right 6/30/2021    Procedure: CYSTOSCOPY;  Surgeon: William Diaz MD;  Location: [facility] OR 1ST FLR;  Service: Urology;  Laterality: Right;    CYSTOSCOPY W/ URETERAL STENT PLACEMENT Right 6/16/2021    Procedure: CYSTOSCOPY, WITH URETERAL STENT INSERTION;  Surgeon: William Diaz MD;  Location: [facility] OR 2ND FLR;  Service: Urology;  Laterality: Right;  FLUORO LESS THAN 1 HOUR    ENDOSCOPIC ULTRASOUND OF UPPER GASTROINTESTINAL TRACT N/A 2/26/2020    Procedure: ULTRASOUND, UPPER GI TRACT, ENDOSCOPIC;  Surgeon: Robbie Yang MD;  Location: [facility];  Service: Endoscopy;  Laterality: N/A;    ESOPHAGOGASTRODUODENOSCOPY N/A 7/8/2019    Procedure: ESOPHAGOGASTRODUODENOSCOPY (EGD);  Surgeon: Huan Brumfield MD;  Location: [facility];  Service: Endoscopy;  Laterality: N/A;    ESOPHAGOGASTRODUODENOSCOPY N/A 5/13/2021    Procedure: EGD (ESOPHAGOGASTRODUODENOSCOPY);  Surgeon: Huan Brumfield MD;  Location: [facility];  Service: Endoscopy;  Laterality: N/A;    EXCISION OF HYDROCELE Bilateral 6/16/2021    Procedure: HYDROCELE REPAIR;  Surgeon: William Diaz MD;  Location: [facility] OR 2ND FLR;  Service: Urology;  Laterality: Bilateral;  2  HOURS    FLUOROSCOPY N/A 2021    Procedure: FLUOROSCOPY;  Surgeon: William Diaz MD;  Location: [facility] OR 2ND FLR;  Service: Urology;  Laterality: N/A;    GASTRECTOMY      INSERTION OF DIALYSIS CATHETER N/A 2019    Procedure: pleurx;  Surgeon: Ewa Diagnostic Provider;  Location: [facility] OR 2ND FLR;  Service: General;  Laterality: N/A;  Room 188/[place]    KNEE ARTHROSCOPY      LAPAROSCOPIC CHOLECYSTECTOMY N/A 2020    Procedure: CHOLECYSTECTOMY, LAPAROSCOPIC;  Surgeon: SON Rowe MD;  Location: [facility] OR;  Service: General;  Laterality: N/A;    LASER LITHOTRIPSY N/A 2021    Procedure: LITHOTRIPSY, USING LASER;  Surgeon: William Diaz MD;  Location: [facility] OR 1ST FLR;  Service: Urology;  Laterality: N/A;    LIVER BIOPSY N/A 2020    Procedure: BIOPSY, LIVER, Laproscopic ;  Surgeon: SON Rowe MD;  Location: [facility] OR;  Service: General;  Laterality: N/A;    perianal surgery      perianal cyst removed    PYELOSCOPY Right 2021    Procedure: PYELOSCOPY;  Surgeon: William Diaz MD;  Location: [facility] OR 1ST FLR;  Service: Urology;  Laterality: Right;    REPLACEMENT OF STENT Right 2021    Procedure: REPLACEMENT, STENT;  Surgeon: William Diaz MD;  Location: [facility] OR 1ST FLR;  Service: Urology;  Laterality: Right;    URETEROSCOPY Right 2021    Procedure: URETEROSCOPY FLEXIBLE URETEROSCOPE;  Surgeon: William Diaz MD;  Location: [facility] OR 1ST FLR;  Service: Urology;  Laterality: Right;       Social History     Socioeconomic History    Marital status:    Tobacco Use    Smoking status: Former     Current packs/day: 0.00     Average packs/day: 2.0 packs/day for 30.0 years (60.0 ttl pk-yrs)     Types: Cigarettes     Start date: 1975     Quit date: 2005     Years since quittin.7    Smokeless tobacco: Never   Substance and Sexual Activity    Alcohol use: No     Comment: started ~, reports 1 shot daily, max 3 shots daily, vague about alcohol  "consumption. Last drink 9/2018    Drug use: No     Social Determinants of Health     Financial Resource Strain: Low Risk  (10/13/2023)    Overall Financial Resource Strain (CARDIA)     Difficulty of Paying Living Expenses: Not very hard   Physical Activity: Unknown (10/13/2023)    Exercise Vital Sign     Days of Exercise per Week: 7 days     Minutes of Exercise per Session: Patient refused   Stress: Unknown (10/13/2023)    [employer] - Occupational Stress Questionnaire     Feeling of Stress : Patient refused   Social Connections: Moderately Isolated (10/13/2023)    Social Connection and Isolation Panel [NHANES]     Frequency of Communication with Friends and Family: More than three times a week     Frequency of Social Gatherings with Friends and Family: More than three times a week     Attends Synagogue Services: Never     Active Member of Clubs or Organizations: No     Attends Club or Organization Meetings: Never     Marital Status:          Vital Signs Range (Last 24H):  Temp:  [36.4 °C (97.5 °F)-37 °C (98.6 °F)]   Pulse:  [80-86]   Resp:  [16-20]   BP: (131-189)/(62-88)   SpO2:  [96 %-98 %]       CBC:   Recent Labs     10/14/23  0538 10/15/23  0216   WBC 5.09 5.44   RBC 3.74* 4.00*   HGB 11.4* 12.0*   HCT 33.2* 36.3*    151   MCV 89 91   MCH 30.5 30.0   MCHC 34.3 33.1       CMP:   Recent Labs     10/14/23  0538 10/15/23  0216 10/16/23  0705     141 141 141   K 4.2  4.4 4.4 4.3   *  113* 115* 117*   CO2 18*  20* 19* 17*   BUN 35*  35* 31* 28*   CREATININE 3.9*  3.7* 3.8* 3.5*     95 78 83   CALCIUM 7.9*  7.9* 7.8* 7.7*   ALBUMIN 2.2* 2.3*  --    PROT 5.2* 5.5*  --    ALKPHOS 114 118  --    ALT 15 18  --    AST 20 24  --    BILITOT 0.3 0.2  --        INR  No results for input(s): "PT", "INR", "PROTIME", "APTT" in the last 72 hours.          Pre-op Assessment    I have reviewed the Patient Summary Reports.     I have reviewed the " Nursing Notes. I have reviewed the NPO Status.   I have reviewed the Medications.     Review of Systems  Anesthesia Hx:  No problems with previous Anesthesia  Denies Family Hx of Anesthesia complications.   Denies Personal Hx of Anesthesia complications.   Hematology/Oncology:     Oncology Normal     Cardiovascular:   Hypertension Valvular problems/Murmurs CAD  CABG/stent     Pulmonary:   Sleep Apnea    Renal/:   Chronic Renal Disease    Hepatic/GI:   Liver Disease,    Neurological:   Denies CVA. Neuromuscular Disease,  Seizures    Endocrine:   Diabetes, poorly controlled  Obesity / BMI > 30      Physical Exam  General: Alert and Oriented    Airway:  Mallampati: II   Mouth Opening: Normal  TM Distance: Normal  Tongue: Normal    Dental:  Periodontal disease    Chest/Lungs:  Clear to auscultation, Normal Respiratory Rate    Heart:  Rate: Normal  Rhythm: Regular Rhythm        Anesthesia Plan  Type of Anesthesia, risks & benefits discussed:    Anesthesia Type: Gen Natural Airway  Intra-op Monitoring Plan: Standard ASA Monitors  Post Op Pain Control Plan: IV/PO Opioids PRN and multimodal analgesia  Induction:  IV  Informed Consent: Informed consent signed with the Patient and all parties understand the risks and agree with anesthesia plan.  All questions answered.   ASA Score: 3  Day of Surgery Review of History & Physical: H&P Update referred to the surgeon/provider.    Ready For Surgery From Anesthesia Perspective.     .

## 2023-10-16 NOTE — PROGRESS NOTES
Notified patient's sister s/p cystoscopy, patient AAOx3, stone noted in right ureter with stent placement.  Sister inquired regarding pending d/c, instructed sister to follow up with assigned nurse on floor for d/c details.  Verbalized understanding.

## 2023-10-16 NOTE — SUBJECTIVE & OBJECTIVE
Interval History: Received 1x dose of clindamycin for potential pyelonephritis. Urine cx pending. Renal U/S with mild R hydronephrosis w/o evidence of nephrolithiasis. CT with narrowing of R ureter. Patient received mIVF 10/13 - 10/16. ELIEZER improving 4.7 --> 4.5 --> 3.9 --> 3.8 --> 3.5. Urology consulted for hydronephrosis and h/o nephrolithiasis, patient NPO at midnight for cystoscopy +/- stent today. Patient states he has a long history of recurrent stones. Denies SOB, CP, current abdominal or flank pain.     Review of patient's allergies indicates:  No Known Allergies  Current Facility-Administered Medications   Medication Frequency    0.9%  NaCl infusion Continuous    acetaminophen tablet 650 mg Q4H PRN    amLODIPine tablet 10 mg Daily    dextrose 10% bolus 125 mL 125 mL PRN    dextrose 10% bolus 125 mL 125 mL PRN    dextrose 10% bolus 250 mL 250 mL PRN    dextrose 10% bolus 250 mL 250 mL PRN    fentaNYL 50 mcg/mL injection 25 mcg Q5 Min PRN    glucagon (human recombinant) injection 1 mg PRN    glucose chewable tablet 16 g PRN    glucose chewable tablet 16 g PRN    glucose chewable tablet 24 g PRN    glucose chewable tablet 24 g PRN    heparin (porcine) injection 5,000 Units Q8H    hydrALAZINE tablet 50 mg Q8H PRN    insulin aspart U-100 pen 0-5 Units QID (AC + HS) PRN    insulin aspart U-100 pen 5 Units TIDWM    insulin detemir U-100 (Levemir) pen 18 Units QHS    melatonin tablet 6 mg Nightly PRN    naloxone 0.4 mg/mL injection 0.02 mg PRN    ondansetron injection 4 mg Q8H PRN    prochlorperazine injection Soln 5 mg Q6H PRN    sodium chloride 0.65 % nasal spray 1 spray PRN    sodium chloride 0.9% flush 10 mL PRN    tamsulosin 24 hr capsule 0.4 mg QHS       Objective:     Vital Signs (Most Recent):  Temp: 97.6 °F (36.4 °C) (10/16/23 1203)  Pulse: 72 (10/16/23 1300)  Resp: 11 (10/16/23 1300)  BP: 138/67 (10/16/23 1300)  SpO2: 99 % (10/16/23 1300) Vital Signs (24h Range):  Temp:  [97.5 °F (36.4 °C)-98.6 °F (37 °C)]  97.6 °F (36.4 °C)  Pulse:  [64-86] 72  Resp:  [11-20] 11  SpO2:  [96 %-100 %] 99 %  BP: ()/(55-88) 138/67     Weight: 126.6 kg (279 lb) (10/13/23 1025)  Body mass index is 43.7 kg/m².  Body surface area is 2.45 meters squared.    I/O last 3 completed shifts:  In: 800 [P.O.:800]  Out: 850 [Urine:850]     Physical Exam  Vitals and nursing note reviewed.   Constitutional:       General: He is not in acute distress.     Appearance: He is not ill-appearing, toxic-appearing or diaphoretic.   HENT:      Head: Normocephalic and atraumatic.      Right Ear: External ear normal.      Left Ear: External ear normal.      Nose: Nose normal.      Mouth/Throat:      Mouth: Mucous membranes are moist.      Pharynx: Oropharynx is clear.   Eyes:      General: No scleral icterus.     Extraocular Movements: Extraocular movements intact.      Conjunctiva/sclera: Conjunctivae normal.      Pupils: Pupils are equal, round, and reactive to light.   Cardiovascular:      Rate and Rhythm: Normal rate and regular rhythm.      Pulses: Normal pulses.      Heart sounds: Normal heart sounds.   Pulmonary:      Effort: Pulmonary effort is normal. No respiratory distress.   Abdominal:      General: There is no distension.      Tenderness: There is no abdominal tenderness. There is no right CVA tenderness, left CVA tenderness, guarding or rebound.   Musculoskeletal:         General: No swelling or deformity. Normal range of motion.      Cervical back: Normal range of motion.      Right lower leg: Edema present.      Left lower leg: Edema present.   Skin:     General: Skin is warm and dry.      Coloration: Skin is not jaundiced.   Neurological:      General: No focal deficit present.      Mental Status: He is alert and oriented to person, place, and time. Mental status is at baseline.      Motor: No weakness.          Significant Labs:  BMP:   Recent Labs   Lab 10/16/23  0705   GLU 83      K 4.3   *   CO2 17*   BUN 28*   CREATININE 3.5*    CALCIUM 7.7*     CBC:   Recent Labs   Lab 10/15/23  0216   WBC 5.44   RBC 4.00*   HGB 12.0*   HCT 36.3*      MCV 91   MCH 30.0   MCHC 33.1     CMP:   Recent Labs   Lab 10/15/23  0216 10/16/23  0705   GLU 78 83   CALCIUM 7.8* 7.7*   ALBUMIN 2.3*  --    PROT 5.5*  --     141   K 4.4 4.3   CO2 19* 17*   * 117*   BUN 31* 28*   CREATININE 3.8* 3.5*   ALKPHOS 118  --    ALT 18  --    AST 24  --    BILITOT 0.2  --      All labs within the past 24 hours have been reviewed.     Significant Imaging:  Labs: Reviewed  US: Reviewed  CT: Reviewed

## 2023-10-16 NOTE — ASSESSMENT & PLAN NOTE
Hx of EF 30 %  ECHO 6/19:   Mildly decreased left ventricular systolic function. The estimated ejection fraction is 45-50%   Left ventricular diastolic dysfunction.   Normal right ventricular systolic function.   Normal central venous pressure (3 mm Hg).   Extremely technically challenging study, poor endocardial visualization, would recommend repeating with contrast if additional information is desired.    10/13- dc IVF until echo returns and Nephrology see pt.   ECHO 10/13:   Left Ventricle: The left ventricle is normal in size. Normal wall thickness. Septal motion is consistent with post-operative status. There is low normal systolic function with a visually estimated ejection fraction of 50 - 55%. There is normal diastolic function.    Right Ventricle: Normal right ventricular cavity size. Wall thickness is normal. Right ventricle wall motion  is normal. Systolic function is normal.    Pulmonary Artery: The estimated pulmonary artery systolic pressure is 21 mmHg.    IVC/SVC: Normal venous pressure at 3 mmHg.    10/15- STABLE, On NS 125cc/h

## 2023-10-16 NOTE — PROGRESS NOTES
"[facility] ([address] Medicine  Progress Note    Patient Name: Jian Arrieta  MRN: [id number]  Patient Class: IP- Inpatient   Admission Date: 10/12/2023  Length of Stay: 4 days  Attending Physician: Sharla Duran MD  Primary Care Provider: Leon Barajas DO        Subjective:     Principal Problem:Acute renal failure superimposed on stage 3 chronic kidney disease        HPI:  69 yo M with PMHx MARIE (liver bx 3/2019 w/o cirrhosis), hx of sleeve gastrectomy, CAD s/p CABG, and CKD3 w/ hx of obstructive nephrolithiasis who presents to the ED from nephrology clinic for worsening renal function for last 2 weeks. Pt. Baseline Cr ~2.2, but he recently presented to [facility] ED with B/L flank pain. Cr wasmildly elevated at 2.56 and CT renal stone revealed "mild fullness of the proximal R renal collecting system without evidence of obstructing stone and surrounding perinephric stranding." Pt. Was discharged from the ED with PO doxycycline to treat possible pyelonephritis although U/A was LE negative and not sent for culture. he obtained nephrology f/u yesterday, and on repeat labs Cr noted to be 4.7 and K+ 5.2., so he was referred to the ED. He reports some persistent flank pain, but it has improved. He denies any fevers, chills, nausea, or vomiting. No reported changes in urine output, dysuria, or hematuria. On ED arrival, pt. Noted to have  and pt. Reports that his omnipod Dash - Insulin pump must be malfunctioning, although he is not sure when it stopped working. Labs 3 days ago show normal BG.      Overview/Hospital Course:  10/13- admitted for uncontrollled diabetes, acute renal failure, obstructive nephropathy. Recently treated for pyelo w doxycycline. /79   Pulse 97  . Cr 4.9-> 4.5 BL 2.1.  -> 340. Received NS. UO- 300 cc.  One BM this am.  Nurse reports leg hot and red, photos and put in file - exam consistent w stasis dermatitis. AF. Started empiric clindamycin, " this am, and discontinued.  continue NS x one liter.  Urology to place stent in am.   Cxr - mild pulm edema  CT abd - Mild right hydronephrosis and right hydroureter with gradual tapering without evidence for ureteral calculus, these findings may relate to sequelae of recently passed calculus, clinical and historical correlation is needed.  Mild perinephric stranding bilaterally, nonspecific however correlation for UTI/pyelonephritis is needed.  Mild urinary bladder wall thickening may relate to incomplete distention however correlation for UTI/cystitis is needed.  Mild circumferential thickening of the distal descending colon/proximal sigmoid colon with mild pericolonic haziness, correlation for mild colitis to include mild diverticulitis is needed.  Right adrenal nodule likely representing adrenal adenoma.  10/14- appreciate Urology f/u - no need for stent because renal function improving. Renal US showing mild right hydronephrosis. Cr 4.5-> 3.7  baseline around 2.2. Continuing IVFs.  follow up with Urology- Dr. Diaz outpatient for MAG3. Resume diabetic diet. Endocrine following.  BS 92 this am.    10/15- Cr 3.8 from 3.9 from 4.5, baseline around 2.2.  NPO at midnight for possible stent tomorrow. Appreciate Urology f/u.   10/16-  lab cr 3.5.  NPO.             Interval History: see above    Review of Systems   Constitutional:  Positive for activity change. Negative for appetite change and fever.   HENT:  Negative for trouble swallowing.    Respiratory:  Negative for cough and shortness of breath.    Cardiovascular:  Positive for leg swelling. Negative for chest pain.   Gastrointestinal:  Positive for abdominal distention. Negative for blood in stool, diarrhea, nausea and vomiting.   Genitourinary:  Negative for difficulty urinating.   Musculoskeletal:  Negative for arthralgias, gait problem and neck stiffness. Back pain: falnks, bilat (PTA).  Skin:  Positive for rash (chronicnLE erythema due to venous stasis).    Neurological:  Negative for dizziness and headaches.   Psychiatric/Behavioral:  Negative for agitation, behavioral problems and confusion.      Objective:     Vital Signs (Most Recent):  Temp: 98 °F (36.7 °C) (10/16/23 0750)  Pulse: 86 (10/16/23 0750)  Resp: 18 (10/16/23 0750)  BP: 131/62 (10/16/23 0750)  SpO2: 98 % (10/16/23 0750) Vital Signs (24h Range):  Temp:  [97.5 °F (36.4 °C)-98.6 °F (37 °C)] 98 °F (36.7 °C)  Pulse:  [80-86] 86  Resp:  [16-20] 18  SpO2:  [96 %-98 %] 98 %  BP: (131-189)/(62-88) 131/62     Weight: 126.6 kg (279 lb)  Body mass index is 43.7 kg/m².    Intake/Output Summary (Last 24 hours) at 10/16/2023 0908  Last data filed at 10/15/2023 1730  Gross per 24 hour   Intake 800 ml   Output --   Net 800 ml           Physical Exam  Constitutional:       General: He is not in acute distress.     Appearance: Normal appearance. He is obese. He is not ill-appearing, toxic-appearing or diaphoretic.   HENT:      Head: Normocephalic and atraumatic.      Nose: Nose normal.      Comments: rhynophyma     Mouth/Throat:      Mouth: Mucous membranes are moist.      Pharynx: Oropharynx is clear.   Eyes:      General: No scleral icterus.     Extraocular Movements: Extraocular movements intact.      Conjunctiva/sclera: Conjunctivae normal.      Pupils: Pupils are equal, round, and reactive to light.   Cardiovascular:      Rate and Rhythm: Normal rate and regular rhythm.      Pulses: Normal pulses.      Heart sounds: Normal heart sounds.   Pulmonary:      Effort: Pulmonary effort is normal. No respiratory distress.      Breath sounds: Normal breath sounds. No stridor. No wheezing, rhonchi or rales.   Chest:      Chest wall: No tenderness.   Abdominal:      General: Abdomen is flat. Bowel sounds are normal. There is no distension.      Palpations: Abdomen is soft.      Tenderness: There is no abdominal tenderness. There is no right CVA tenderness, left CVA tenderness, guarding or rebound.   Musculoskeletal:          General: Swelling (chronic LE) present. No tenderness, deformity or signs of injury. Normal range of motion.      Cervical back: Normal range of motion and neck supple. No rigidity or tenderness.      Right lower leg: Edema present.      Left lower leg: Edema present.   Lymphadenopathy:      Cervical: No cervical adenopathy.   Skin:     General: Skin is warm and dry.      Coloration: Skin is not jaundiced.      Findings: Rash (venous stasis dermatists, not hot to touch) present. No erythema.   Neurological:      General: No focal deficit present.      Mental Status: He is alert and oriented to person, place, and time. Mental status is at baseline.      Motor: No weakness.      Coordination: Coordination normal.      Gait: Gait normal.   Psychiatric:         Mood and Affect: Mood normal.         Behavior: Behavior normal.         Thought Content: Thought content normal.         Judgment: Judgment normal.             Significant Labs: All pertinent labs within the past 24 hours have been reviewed.  CBC:   Recent Labs   Lab 10/15/23  0216   WBC 5.44   HGB 12.0*   HCT 36.3*          CMP:   Recent Labs   Lab 10/15/23  0216      K 4.4   *   CO2 19*   GLU 78   BUN 31*   CREATININE 3.8*   CALCIUM 7.8*   PROT 5.5*   ALBUMIN 2.3*   BILITOT 0.2   ALKPHOS 118   AST 24   ALT 18   ANIONGAP 7*         Significant Imaging: I have reviewed all pertinent imaging results/findings within the past 24 hours.      Assessment/Plan:      * Acute renal failure superimposed on stage 3 chronic kidney disease  -Cr 4.7, baseline ~2.2. Imaging shows Mild right hydronephrosis and right hydroureter with gradual tapering and FENa 8% consistent with post-obstructive etiology. Urology consult for assistance  -IV fluids, avoid nephrotoxins and continue to trend Cr    10/13- Nephrology and urology were consulted May be due to hypertensive emergency in the setting of chronic kidney disease.  Monitoring UO.- only 300 cc last night. Post  void residual = 0. Had a loose stool.  Consider cardiorenal syndrome. Will get ECHO. Urology planning on stent placement in am.     10/14- continue IVFs today. ECHO was NL.  Treat BP.   10/15- UO= 1050, on  cc/ h. Cr 3.8- no change. Possible Ureter stent placement in am.     Hypertensive emergency  221/94 upon admit  Hypertensive Emergency present on admission, with ARF  reports not taking any HTN medications. Plan to start scheduled amlodipine, hydralazine ordered PRN. Monitor BG and adjust as needed    10/13- /79 on norvasc. ->  176/83 . Prn hydralazine po is ordered   10/14- /65 received one dose of hydralazine yesterday  10/15- not requiring prns. /76     Type 2 diabetes mellitus with diabetic neuropathy, with long-term current use of insulin  -Pt. Hyperglycemic to >500 on presentation, suspect acute insulin pump malfunction as BG normal 3 days ago and A1c 7.6  -Levemir 20 units QHS and SSI ordered with diabetic/renal diet. Endocrinology consulted for assistance  -Has insulin pump, which is turned off at this time.    - Hypoglycemic protocol    10/13- BS improving. OR for cystoscopy and stent placement in am.  May attempt to place new pod and restart pump tomorrow.  Recent Labs     10/15/23  0757 10/15/23  1140 10/15/23  1717 10/15/23  1953 10/15/23  2333 10/16/23  0748   POCTGLUCOSE 174* 169* 300* 215* 197* 82     IVFs today, if continues to improve may dc tomorrow or Mionday    Chronic combined systolic and diastolic heart failure  Hx of EF 30 %  ECHO 6/19:   Mildly decreased left ventricular systolic function. The estimated ejection fraction is 45-50%   Left ventricular diastolic dysfunction.   Normal right ventricular systolic function.   Normal central venous pressure (3 mm Hg).   Extremely technically challenging study, poor endocardial visualization, would recommend repeating with contrast if additional information is desired.    10/13- dc IVF until echo returns and Nephrology  see pt.   ECHO 10/13:   Left Ventricle: The left ventricle is normal in size. Normal wall thickness. Septal motion is consistent with post-operative status. There is low normal systolic function with a visually estimated ejection fraction of 50 - 55%. There is normal diastolic function.    Right Ventricle: Normal right ventricular cavity size. Wall thickness is normal. Right ventricle wall motion  is normal. Systolic function is normal.    Pulmonary Artery: The estimated pulmonary artery systolic pressure is 21 mmHg.    IVC/SVC: Normal venous pressure at 3 mmHg.    10/15- STABLE, On NS 125cc/h    Venous stasis dermatitis of both lower extremities  exam not consistent with cellulitis  Recent course of doxycycline as outpt.   Empiric clindamycin IV.- stopped  IVFs- one liter tonight  10/15- still on IVFs, cr 3.8.       Other cirrhosis of liver  -Chronic MARIE, no acute issues, compensated      Insulin pump status  Endocrine consulted  Pump is off.  Will attempt to place new pod and restart pump 10/14.      History of colon polyps  XT abd reveals thickened colon  F/u GI for colonoscopy  - only one stool yesterday      Class 3 obesity  daibetic diet      Chronic kidney disease, stage 3  BL cr 2.1      Hyperlipidemia associated with type 2 diabetes mellitus  Not on lipid therapy at home      Paroxysmal atrial fibrillation  Hx of  Not on anticoagulation    VTE Risk Mitigation (From admission, onward)         Ordered     heparin (porcine) injection 5,000 Units  Every 8 hours         10/12/23 2227     IP VTE HIGH RISK PATIENT  Once         10/12/23 2227     Place sequential compression device  Until discontinued         10/12/23 2227                Discharge Planning   [place]IL[place]: 10/17/2023     Code Status: Full Code   Is the patient medically ready for discharge?: No    Reason for patient still in hospital (select all that apply): Patient trending condition  Discharge Plan A: Home                  Sharla Duran  MD  Department of Hospital Medicine   [facility] ([place])

## 2023-10-16 NOTE — PROGRESS NOTES
Patient transported in stretcher to floor by transport staff.  AAOx3, denied voiding.  No distress or complaints of pain verbalized at this time.

## 2023-10-16 NOTE — ASSESSMENT & PLAN NOTE
"Creatinine 4.7 on admit, baseline around 2.2. Improving, 3.5 today.     Jian Arrieta is a 68M w/ MARIE (liver bx 3/2019 w/o cirrhosis), s/p sleeve gastrectomy, CAD s/p CABG, CKD3 (BL creatinine 2.2), and history of obstructive nephrolithiasis who presents from nephrology clinic w/ worsening renal function x 2 weeks. Recent admission to  w/ nephrolithiasis of R kidney. Was discharged from  w/ doxycycline out of concern for pyelonephritis. In clinic, creatinine was noted to be significantly elevated to 4.7 prompting ED referral. On arrival, reported improving but persistent flank pain. Denies any subjective fevers/chills/N/V. Denies changes in urine output, dysuria, or hematuria. Of note, blood glucose 516 on arrival. Nephrology consulted for "acute on chronic kidney failure."    Lab Results   Component Value Date    CREATININE 3.5 (H) 10/16/2023     Estimated Creatinine Clearance: 25.4 mL/min (A) (based on SCr of 3.5 mg/dL (H)).    CT A/P:   "- Mild right hydronephrosis and right hydroureter with gradual tapering without evidence for ureteral calculus, these findings may relate to sequelae of recently passed calculus, clinical and historical correlation is needed.   - Mild perinephric stranding bilaterally, nonspecific however correlation for UTI/pyelonephritis is needed."    TTE with EF 50-55%, normal diastolic function, no evidence of pHTN, CVP 3.    Renal U/S with mild R hydronephrosis w/o evidence of nephrolithiasis.     UA: 3+ protein, 4+ glucose, nitrite (-), RBC 15, WBC 11, rare bacteria  Urine chloride 80  Urine creatinine 36.0  Urine microalbumin >5k  Urine protein 781  UPCR 4.29  Urine Na 81    Urology op note 10/16/23:  1. Right retrograde pyelogram with questionable filling defect at right UPJ  2. Right ureteroscopy with radiolucent stone identified at right UPJ  3. Right pyeloscopy revealed turbid urine and additional radiolucent stone burden in the right kidney  4. Right ureteral stent placed with " good coils on fluoroscopy and direct vision in the bladder    Etiology of Acute component superimposed on CKD3: Suspect etiology is multifactorial including ongoing bilateral pyelonephritis, hypertensive emergency and nephrolithiasis in the setting of chronic kidney disease. Resolved with mIVF, patient with ureteral stent placement per urology. Patient likely has diabetic nephropathy with proteinuria and h/o A1c to 11.5% one year ago, most recent 7.6%. Would consider starting a SGLT2 inhibitor.     Plan:  - Trend RFP daily  - Strict I&Os and daily weights   - considered gentle IVF but c/f possible volume overload   - Avoid nephrotoxic agents such as NSAIDs, gadolinium and IV radiocontrast  - Renally dose meds to current GFR  - Maintain MAP > 65  - f/u renin, aldosterone, aldosterone/renin ratio.

## 2023-10-16 NOTE — PLAN OF CARE
Report given to nurse Oswald.  Patient being transported to floor via stretcher by transport staff.  Pt alert, oriented and talkative, vitals stable, on room air, tolerating PO intake.

## 2023-10-16 NOTE — TRANSFER OF CARE
"Anesthesia Transfer of Care Note    Patient: Jian Arrieta    Procedure(s) Performed: Procedure(s) (LRB):  CYSTOSCOPY (N/A)  PYELOGRAM, RETROGRADE (Right)  INSERTION, STENT, URETER (Right)  URETEROSCOPY (Right)  PYELOSCOPY (Right)    Patient location: [facility]    Anesthesia Type: general and MAC    Transport from OR: Transported from OR on room air with adequate spontaneous ventilation    Post pain: adequate analgesia    Post assessment: no apparent anesthetic complications and tolerated procedure well    Post vital signs: stable    Level of consciousness: sedated    Nausea/Vomiting: no nausea/vomiting    Complications: none    Transfer of care protocol was followed      Last vitals:   Visit Vitals  BP (!) 91/55 (BP Location: Right arm, Patient Position: Lying)   Pulse 64   Temp 36.4 °C (97.6 °F) (Skin)   Resp 15   Ht 5' 7" (1.702 m)   Wt 126.6 kg (279 lb)   SpO2 100%   BMI 43.70 kg/m²     "

## 2023-10-16 NOTE — PROGRESS NOTES
[facility] ([place] [address])  Urology  Progress Note    Patient Name: Jian Arrieta  MRN: [id number]  Admission Date: 10/12/2023  Hospital Length of Stay: 4 days  Code Status: Full Code   Attending Provider: Sharla Duran MD   Primary Care Physician: Leon Barajas, DO    Subjective:     HPI:  Mr. Arrieta is a 67 yo male with MARIE (liver bx 3/2019 w/o cirrhosis), s/p sleeve gastrectomy, CAD s/p CABG, and CKD3 w/ hx of nephrolithiasis admitted to  from nephrology for worsening Cr. Urology consulted for right hydronephrosis.    History of R proximal stone s/p ureteroscopy in June 2021 with Dr. Diaz recently seen in clinic 10/9/23 with plans for an outpatient MAG3 scan vs cysto with R RPG. [place] [facility] [place], Cr noted to be 2.56 from baseline of 2.2.. UA nonconcerning for infection and did not reflex to culture, however, he was discharged from ED with doxy for possible pyelonephritis. Seen in nephrology clinic yesterday with Cr elevated to 4.7 and sent to [facility] [place].    Upon assessment, afebrile resting comfortably.  Denies flank pain, hematuria.  Reports some RLQ pain on admission that has since resolved. Denies any changes in urination, feels like he is completely emptying his bladder.  Denies fevers, chills but does report one episode of night sweats a few days ago.       Labs 10/13/23: WBC 5.15 Hgb 11.4 Cr 4.5 (4.7, baseline approx 2.2) Glucose >500 in ED.  Calculated FeNa 8.    UA 10/12/23: 15 RBC, 11 WBC, rare bacteria, nitrite neg, no squams. Urine culture in process.     CTRSS 10/12/23: Mild right hydroureteronephrosis to the level of the pelvis, no left hydronephrosis, no urolithiasis bilaterally, bladder partially distended. Small right adrenal nodule approx 1.6 cm, 5-13 HU. Moderate stool burden. Left renal calcification called by nephrology appears to be arterial calcification.      Interval History: NAOE. Resting in bed. NPO since midnight.        Objective:     Temp:  [97.5 °F (36.4 °C)-98.6 °F (37 °C)] 97.8 °F (36.6 °C)  Pulse:  [74-83] 81  Resp:  [16-20] 18  SpO2:  [96 %-98 %] 98 %  BP: (145-189)/(70-88) 152/70     Body mass index is 43.7 kg/m².      Post Void Cath Residual Output (mL): 0 mL (10/13/23 1435)    Drains       Drain  Duration                  Ureteral Drain/Stent 06/16/21 0912 Right ureter 24 Fr. 851 days                     Physical Exam  Constitutional:       General: He is not in acute distress.  HENT:      Head: Normocephalic.   Eyes:      Extraocular Movements: Extraocular movements intact.   Cardiovascular:      Rate and Rhythm: Normal rate.   Pulmonary:      Effort: Pulmonary effort is normal. No respiratory distress.   Abdominal:      Palpations: Abdomen is soft.      Tenderness: There is no right CVA tenderness or left CVA tenderness.   Musculoskeletal:         General: No swelling or deformity.      Cervical back: Normal range of motion.   Skin:     General: Skin is warm and dry.   Neurological:      General: No focal deficit present.      Mental Status: He is alert.   Psychiatric:         Mood and Affect: Mood normal.         Behavior: Behavior normal.           Significant Labs:    BMP:  Recent Labs   Lab 10/13/23  0526 10/14/23  0538 10/15/23  0216   * 141  141 141   K 4.6 4.2  4.4 4.4    114*  113* 115*   CO2 20* 18*  20* 19*   BUN 37* 35*  35* 31*   CREATININE 4.5* 3.9*  3.7* 3.8*   CALCIUM 8.0* 7.9*  7.9* 7.8*       CBC:   Recent Labs   Lab 10/13/23  0526 10/14/23  0538 10/15/23  0216   WBC 5.15 5.09 5.44   HGB 11.4* 11.4* 12.0*   HCT 33.8* 33.2* 36.3*   * 160 151       All pertinent labs results from the past 24 hours have been reviewed.    Significant Imaging:  All pertinent imaging results/findings from the past 24 hours have been reviewed.                    Assessment/Plan:     * Acute renal failure superimposed on stage 3 chronic kidney disease  Jian Arrieta is a 68 y.o. M with acute  renal failure and mild right hydronephrosis.     -- Renal US showing mild right hydronephrosis.   -- Cr 3.8 yesterday 3.9 from 4.5, baseline around 2.2.   -- F/u BMP this morning  -- NPO for possible stent today  -- IVFs as able per primary.  Patient with history of CHF, however echo yesterday showing good EF.   -- Continue IVFs.   -- rest of care per primary           VTE Risk Mitigation (From admission, onward)         Ordered     heparin (porcine) injection 5,000 Units  Every 8 hours         10/12/23 2227     IP VTE HIGH RISK PATIENT  Once         10/12/23 2227     Place sequential compression device  Until discontinued         10/12/23 2227                CIRO SHOOK MD  Urology  [facility] - Intensive Care ([place])

## 2023-10-16 NOTE — ASSESSMENT & PLAN NOTE
Jian Arrieta is a 68 y.o. M with acute renal failure and mild right hydronephrosis.     -- Renal US showing mild right hydronephrosis.   -- Cr 3.8 yesterday 3.9 from 4.5, baseline around 2.2.   -- F/u BMP this morning  -- NPO for possible stent today  -- IVFs as able per primary.  Patient with history of CHF, however echo yesterday showing good EF.   -- Continue IVFs.   -- rest of care per primary

## 2023-10-16 NOTE — OP NOTE
[facility] ([place] [address])  Urology Department  Operative Note    Date of Procedure: 10/16/2023     Pre-Operative Diagnosis:   ELIEZER (acute kidney injury) [N17.9]  Right hydronephrosis  Patient Active Problem List    Diagnosis Date Noted    Acute renal failure superimposed on stage 3 chronic kidney disease 10/13/2023    Venous stasis dermatitis of both lower extremities 10/13/2023    Insulin pump status 10/13/2023    Right flank pain 10/09/2023    Hematuria, microscopic 10/09/2023    Diabetes mellitus type 1, with complication, on long term insulin pump 06/07/2023    Pseudomonas aeruginosa infection 08/19/2022    MSSA infection, non-invasive 08/19/2022    Venous insufficiency of both lower extremities 06/14/2022    History of colon polyps 01/13/2022    Other nonthrombocytopenic purpura 01/13/2022    Ureteral stone 06/30/2021    Hematoma of scrotum 06/21/2021    Uric acid kidney stone 05/27/2021    Obstruction of right ureteropelvic junction (UPJ) due to stone 05/24/2021    Bilateral hydrocele 05/24/2021    Vitamin D deficiency 04/07/2021    Hx of CABG 03/31/2021    Class 3 obesity 03/15/2021    Atherosclerosis of aorta 05/11/2020    Other cirrhosis of liver     Diabetes mellitus due to underlying condition, uncontrolled, with renal complication     Current use of long term anticoagulation 03/10/2020    Type 2 diabetes mellitus with stage 3 chronic kidney disease, with long-term current use of insulin 11/15/2019    Medically noncompliant 08/22/2019    Chronic kidney disease, stage 3 07/23/2019    Gastritis     Portal hypertension due to obstruction of extrahepatic portal vein 06/27/2019    Hepatic fibrosis 06/25/2019    Hypertensive emergency 05/06/2019    Hyperlipidemia associated with type 2 diabetes mellitus 05/06/2019    Obesity hypoventilation syndrome 05/06/2019    Paroxysmal atrial fibrillation 03/16/2019    Anemia 03/15/2019    Other ascites 03/13/2019    Type 2 diabetes mellitus with diabetic  neuropathy, with long-term current use of insulin 02/04/2019    Hypoalbuminemia 02/04/2019    Chronic combined systolic and diastolic heart failure 01/29/2019    Lymphedema of both lower extremities 01/29/2019    Other chronic pancreatitis 01/28/2019    Pseudocyst of pancreas 01/28/2019    Aortic atherosclerosis     Onychomycosis of multiple toenails with type 2 diabetes mellitus and peripheral neuropathy 06/20/2017    Status post bariatric surgery 01/11/2016    Diabetic polyneuropathy associated with type 2 diabetes mellitus 09/02/2015    Sleep apnea 08/26/2015    Coronary artery disease due to calcified coronary lesion 05/08/2015    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  Cystoscopy with right retrograde pyelogram  2.  Fluoroscopy < 1 hour  3.  Intra-operative interpretation of radiographic images  4.  Right ureteroscopy  5.  Right pyeloscopy   6.  Right ureteral stent placement    Primary: Chrystal Dias MD  Resident - Assisting: Boby Gutierrez MD        Anesthesia: General    Estimated Blood Loss (EBL): min     Drains: none    Specimens: none    Indications:   Jian Arrieta is a 68 y.o. male with right hydronephrosis and ELIEZER. After the risks, benefits, and alternatives were discussed and all questions were answered to his satisfaction, he elected to proceed with surgery.    Operative Findings:   Right retrograde pyelogram with questionable filling defect at right UPJ  Right ureteroscopy with radiolucent stone identified at right UPJ  Right pyeloscopy revealed turbid urine and additional radiolucent stone burden in the right kidney  Right ureteral stent placed with good coils on fluoroscopy and direct vision in the bladder    Procedure in Detail:  After appropriate informed consent was obtained, the patient was transferred to the OR and placed in the supine position.  Appropriate DVT prophylaxis was applied.   Anesthesia was administered.  Once adequately sedated, the patient was placed in the dorsal lithotomy position and prepped and draped in the usual sterile fashion. Time out was performed, gulshan-procedural antibiotics were confirmed.     A rigid cystoscope in a 22 Fr sheath was introduced into the bladder per urethra. This passed easily. The urethra was visualized which showed no masses or strictures. The right and left ureteral orifices were identified in the normal anatomic position. Cystoscopy was performed which revealed no masses or lesions suspicious for malignancy, no trabeculations, no bladder stones and no bladder diverticula.     The right UO was identified and cannulated with a 5 Fr open-ended ureteral catheter. Using fluoroscopy, a RPG was performed which showed a small questionable filling defect at the right UPJ.     A motion wire was passed through the 5 Fr into the right renal pelvis. A stiff glide wire was then passed along side the motion wire to the right renal pelvis.     A flexible ureteroscope was introduced over the stiff glide wire. A radiolucent stone was encountered at the right UPJ. Additional stone burden and turbid urine were noted on flexible pyeloscopy. The ureteroscope and stiff glide wire were removed, visualizing the entire course of the ureter, which was free from additional stone burden.     A 6 Fr x 26 cm right ureteral stent was placed over the motion wire in the standard fashion with good coils noted on fluoroscopy and direct vision in the bladder.     The bladder was drained, and the patient was removed from lithotomy.     The patient tolerated the procedure well and was transferred to recovery in stable condition.    Disposition:  The patient will follow up with Dr. Diaz for further workup.     Boby Gutierrez MD

## 2023-10-16 NOTE — PLAN OF CARE
Problem: Adult Inpatient Plan of Care  Goal: Plan of Care Review  10/16/2023 0622 by Keri Justin RN  Outcome: Ongoing, Progressing     Problem: Adult Inpatient Plan of Care  Goal: Patient-Specific Goal (Individualized)  Outcome: Ongoing, Progressing     Problem: Diabetes Comorbidity  Goal: Blood Glucose Level Within Targeted Range  Outcome: Ongoing, Progressing     Problem: Fluid and Electrolyte Imbalance (Acute Kidney Injury/Impairment)  Goal: Fluid and Electrolyte Balance  Outcome: Ongoing, Progressing     Problem: Renal Function Impairment (Acute Kidney Injury/Impairment)  Goal: Effective Renal Function  Outcome: Ongoing, Progressing     Problem: Hypertension Acute  Goal: Blood Pressure Within Desired Range  Outcome: Ongoing, Progressing     AAOX4. BP up in 180s, hydralazine PO administered BP down to 152/70. Pt denies any symptoms, will continue to monitor.

## 2023-10-16 NOTE — SUBJECTIVE & OBJECTIVE
Interval History: NAOE. Resting in bed. NPO since midnight.       Objective:     Temp:  [97.5 °F (36.4 °C)-98.6 °F (37 °C)] 97.8 °F (36.6 °C)  Pulse:  [74-83] 81  Resp:  [16-20] 18  SpO2:  [96 %-98 %] 98 %  BP: (145-189)/(70-88) 152/70     Body mass index is 43.7 kg/m².      Post Void Cath Residual Output (mL): 0 mL (10/13/23 1435)    Drains       Drain  Duration                  Ureteral Drain/Stent 06/16/21 0912 Right ureter 24 Fr. 851 days                     Physical Exam  Constitutional:       General: He is not in acute distress.  HENT:      Head: Normocephalic.   Eyes:      Extraocular Movements: Extraocular movements intact.   Cardiovascular:      Rate and Rhythm: Normal rate.   Pulmonary:      Effort: Pulmonary effort is normal. No respiratory distress.   Abdominal:      Palpations: Abdomen is soft.      Tenderness: There is no right CVA tenderness or left CVA tenderness.   Musculoskeletal:         General: No swelling or deformity.      Cervical back: Normal range of motion.   Skin:     General: Skin is warm and dry.   Neurological:      General: No focal deficit present.      Mental Status: He is alert.   Psychiatric:         Mood and Affect: Mood normal.         Behavior: Behavior normal.           Significant Labs:    BMP:  Recent Labs   Lab 10/13/23  0526 10/14/23  0538 10/15/23  0216   * 141  141 141   K 4.6 4.2  4.4 4.4    114*  113* 115*   CO2 20* 18*  20* 19*   BUN 37* 35*  35* 31*   CREATININE 4.5* 3.9*  3.7* 3.8*   CALCIUM 8.0* 7.9*  7.9* 7.8*       CBC:   Recent Labs   Lab 10/13/23  0526 10/14/23  0538 10/15/23  0216   WBC 5.15 5.09 5.44   HGB 11.4* 11.4* 12.0*   HCT 33.8* 33.2* 36.3*   * 160 151       All pertinent labs results from the past 24 hours have been reviewed.    Significant Imaging:  All pertinent imaging results/findings from the past 24 hours have been reviewed.

## 2023-10-16 NOTE — PROGRESS NOTES
"[facility] ([place] [address])  Nephrology  Progress Note    Patient Name: Jian Arrieta  MRN: [id number]  Admission Date: 10/12/2023  Hospital Length of Stay: 4 days  Attending Provider: Sharla Duran MD   Primary Care Physician: Leon Barajas DO  Principal Problem:ELIEZER (acute kidney injury)    Subjective:     HPI: Jian Arrieta is a 68M w/ MARIE (liver bx 3/2019 w/o cirrhosis), s/p sleeve gastrectomy, CAD s/p CABG, CKD3 (BL creatinine 2.2), and history of obstructive nephrolithiasis who presents from nephrology clinic w/ worsening renal function x 2 weeks. Recent admission to [facility]/ nephrolithiasis of R kidney. Was discharged from  w/ doxycycline out of concern for pyelonephritis. In clinic, creatinine was noted to be significantly elevated to 4.7 prompting ED referral. On arrival, reported improving but persistent flank pain. Denies any fevers/chills/N/V. Denies changes in urine output, dysuria, or hematuria. Of note, blood glucose 516 on arrival.    Nephrology consulted for "acute on chronic kidney failure."      Interval History: Received 1x dose of clindamycin for potential pyelonephritis. Urine cx pending. Renal U/S with mild R hydronephrosis w/o evidence of nephrolithiasis. CT with narrowing of R ureter. Patient received mIVF 10/13 - 10/16. ELIEZER improving 4.7 --> 4.5 --> 3.9 --> 3.8 --> 3.5. Urology consulted for hydronephrosis and h/o nephrolithiasis, patient NPO at midnight for cystoscopy +/- stent today. Patient states he has a long history of recurrent stones. Denies SOB, CP, current abdominal or flank pain.     Review of patient's allergies indicates:  No Known Allergies  Current Facility-Administered Medications   Medication Frequency    0.9%  NaCl infusion Continuous    acetaminophen tablet 650 mg Q4H PRN    amLODIPine tablet 10 mg Daily    dextrose 10% bolus 125 mL 125 mL PRN    dextrose 10% bolus 125 mL 125 mL PRN    dextrose 10% bolus 250 mL 250 mL PRN    dextrose 10% bolus 250 " mL 250 mL PRN    fentaNYL 50 mcg/mL injection 25 mcg Q5 Min PRN    glucagon (human recombinant) injection 1 mg PRN    glucose chewable tablet 16 g PRN    glucose chewable tablet 16 g PRN    glucose chewable tablet 24 g PRN    glucose chewable tablet 24 g PRN    heparin (porcine) injection 5,000 Units Q8H    hydrALAZINE tablet 50 mg Q8H PRN    insulin aspart U-100 pen 0-5 Units QID (AC + HS) PRN    insulin aspart U-100 pen 5 Units TIDWM    insulin detemir U-100 (Levemir) pen 18 Units QHS    melatonin tablet 6 mg Nightly PRN    naloxone 0.4 mg/mL injection 0.02 mg PRN    ondansetron injection 4 mg Q8H PRN    prochlorperazine injection Soln 5 mg Q6H PRN    sodium chloride 0.65 % nasal spray 1 spray PRN    sodium chloride 0.9% flush 10 mL PRN    tamsulosin 24 hr capsule 0.4 mg QHS       Objective:     Vital Signs (Most Recent):  Temp: 97.6 °F (36.4 °C) (10/16/23 1203)  Pulse: 72 (10/16/23 1300)  Resp: 11 (10/16/23 1300)  BP: 138/67 (10/16/23 1300)  SpO2: 99 % (10/16/23 1300) Vital Signs (24h Range):  Temp:  [97.5 °F (36.4 °C)-98.6 °F (37 °C)] 97.6 °F (36.4 °C)  Pulse:  [64-86] 72  Resp:  [11-20] 11  SpO2:  [96 %-100 %] 99 %  BP: ()/(55-88) 138/67     Weight: 126.6 kg (279 lb) (10/13/23 1025)  Body mass index is 43.7 kg/m².  Body surface area is 2.45 meters squared.    I/O last 3 completed shifts:  In: 800 [P.O.:800]  Out: 850 [Urine:850]     Physical Exam  Vitals and nursing note reviewed.   Constitutional:       General: He is not in acute distress.     Appearance: He is not ill-appearing, toxic-appearing or diaphoretic.   HENT:      Head: Normocephalic and atraumatic.      Right Ear: External ear normal.      Left Ear: External ear normal.      Nose: Nose normal.      Mouth/Throat:      Mouth: Mucous membranes are moist.      Pharynx: Oropharynx is clear.   Eyes:      General: No scleral icterus.     Extraocular Movements: Extraocular movements intact.      Conjunctiva/sclera: Conjunctivae normal.      Pupils:  Pupils are equal, round, and reactive to light.   Cardiovascular:      Rate and Rhythm: Normal rate and regular rhythm.      Pulses: Normal pulses.      Heart sounds: Normal heart sounds.   Pulmonary:      Effort: Pulmonary effort is normal. No respiratory distress.   Abdominal:      General: There is no distension.      Tenderness: There is no abdominal tenderness. There is no right CVA tenderness, left CVA tenderness, guarding or rebound.   Musculoskeletal:         General: No swelling or deformity. Normal range of motion.      Cervical back: Normal range of motion.      Right lower leg: Edema present.      Left lower leg: Edema present.   Skin:     General: Skin is warm and dry.      Coloration: Skin is not jaundiced.   Neurological:      General: No focal deficit present.      Mental Status: He is alert and oriented to person, place, and time. Mental status is at baseline.      Motor: No weakness.          Significant Labs:  BMP:   Recent Labs   Lab 10/16/23  0705   GLU 83      K 4.3   *   CO2 17*   BUN 28*   CREATININE 3.5*   CALCIUM 7.7*     CBC:   Recent Labs   Lab 10/15/23  0216   WBC 5.44   RBC 4.00*   HGB 12.0*   HCT 36.3*      MCV 91   MCH 30.0   MCHC 33.1     CMP:   Recent Labs   Lab 10/15/23  0216 10/16/23  0705   GLU 78 83   CALCIUM 7.8* 7.7*   ALBUMIN 2.3*  --    PROT 5.5*  --     141   K 4.4 4.3   CO2 19* 17*   * 117*   BUN 31* 28*   CREATININE 3.8* 3.5*   ALKPHOS 118  --    ALT 18  --    AST 24  --    BILITOT 0.2  --      All labs within the past 24 hours have been reviewed.     Significant Imaging:  Labs: Reviewed  US: Reviewed  CT: Reviewed      Assessment/Plan:     Renal/  * ELIEZER (acute kidney injury)  Creatinine 4.7 on admit, baseline around 2.2. Improving, 3.5 today.     Jian Arrieta is a 68M w/ MARIE (liver bx 3/2019 w/o cirrhosis), s/p sleeve gastrectomy, CAD s/p CABG, CKD3 (BL creatinine 2.2), and history of obstructive nephrolithiasis who presents from  "nephrology clinic w/ worsening renal function x 2 weeks. Recent admission to  w/ nephrolithiasis of R kidney. Was discharged from  w/ doxycycline out of concern for pyelonephritis. In clinic, creatinine was noted to be significantly elevated to 4.7 prompting ED referral. On arrival, reported improving but persistent flank pain. Denies any subjective fevers/chills/N/V. Denies changes in urine output, dysuria, or hematuria. Of note, blood glucose 516 on arrival. Nephrology consulted for "acute on chronic kidney failure."    Lab Results   Component Value Date    CREATININE 3.5 (H) 10/16/2023     Estimated Creatinine Clearance: 25.4 mL/min (A) (based on SCr of 3.5 mg/dL (H)).    CT A/P:   "- Mild right hydronephrosis and right hydroureter with gradual tapering without evidence for ureteral calculus, these findings may relate to sequelae of recently passed calculus, clinical and historical correlation is needed.   - Mild perinephric stranding bilaterally, nonspecific however correlation for UTI/pyelonephritis is needed."    TTE with EF 50-55%, normal diastolic function, no evidence of pHTN, CVP 3.    Renal U/S with mild R hydronephrosis w/o evidence of nephrolithiasis.     UA: 3+ protein, 4+ glucose, nitrite (-), RBC 15, WBC 11, rare bacteria  Urine chloride 80  Urine creatinine 36.0  Urine microalbumin >5k  Urine protein 781  UPCR 4.29  Urine Na 81    Urology op note 10/16/23:  Right retrograde pyelogram with questionable filling defect at right UPJ  Right ureteroscopy with radiolucent stone identified at right UPJ  Right pyeloscopy revealed turbid urine and additional radiolucent stone burden in the right kidney  Right ureteral stent placed with good coils on fluoroscopy and direct vision in the bladder    Etiology of Acute component superimposed on CKD3: Suspect etiology is multifactorial including ongoing bilateral pyelonephritis, hypertensive emergency and nephrolithiasis in the setting of chronic kidney " disease. Resolved with mIVF, patient with ureteral stent placement per urology. Patient likely has diabetic nephropathy with proteinuria and h/o A1c to 11.5% one year ago, most recent 7.6%. Would consider starting a SGLT2 inhibitor.     Plan:  - Trend RFP daily  - Strict I&Os and daily weights   - considered gentle IVF but c/f possible volume overload   - Avoid nephrotoxic agents such as NSAIDs, gadolinium and IV radiocontrast  - Renally dose meds to current GFR  - Maintain MAP > 65  - f/u renin, aldosterone, aldosterone/renin ratio        Thank you for your consult. I will follow-up with patient. Please contact us if you have any additional questions.    Mandi Shetty MD  Nephrology  [facility] ([facility]    ATTENDING PHYSICIAN ATTESTATION  I have personally verified the history and examined the patient. I thoroughly reviewed the demographic, clinical, laboratorial and imaging information available in medical records. I agree with the assessment and recommendations provided by the subspecialty resident who was under my supervision.

## 2023-10-16 NOTE — ANESTHESIA POSTPROCEDURE EVALUATION
Anesthesia Post Evaluation    Patient: Jian Arrieta    Procedure(s) Performed: Procedure(s) (LRB):  CYSTOSCOPY (N/A)  PYELOGRAM, RETROGRADE (Right)  INSERTION, STENT, URETER (Right)  URETEROSCOPY (Right)  PYELOSCOPY (Right)    Final Anesthesia Type: general      Patient location during evaluation: PACU  Patient participation: Yes- Able to Participate  Level of consciousness: awake  Post-procedure vital signs: reviewed and stable  Pain management: adequate  Airway patency: patent    PONV status at discharge: No PONV  Anesthetic complications: no      Cardiovascular status: blood pressure returned to baseline  Respiratory status: unassisted, spontaneous ventilation and room air            Vitals Value Taken Time   /73 10/16/23 1316   Temp 36.7 °C (98 °F) 10/16/23 1315   Pulse 79 10/16/23 1324   Resp 35 10/16/23 1324   SpO2 99 % 10/16/23 1324   Vitals shown include unvalidated device data.      No case tracking events are documented in the log.      Pain/Jason Score: Jason Score: 10 (10/16/2023 12:30 PM)

## 2023-10-16 NOTE — PLAN OF CARE
CHW met with patient/family at bedside. Patient experience rounding completed and reviewed the following.     Do you know your discharge plan? Yes     If yes, what is the plan? (Home, w spouse)     Have you discussed your needs and preferences with your SW/CM? Yes     If you are discharging home, do you have help at home? Yes  Do you think you will need help additional at home at discharge? No     Do you currently have difficulty keeping up with bills, affording medicine or buying food?  No    Assigned SW/CM notified of any patient/family needs or concerns. Appropriate resources provided to address patient's needs.  Patient and spouse stated no needs/concerns

## 2023-10-17 ENCOUNTER — TELEPHONE (OUTPATIENT)
Dept: UROLOGY | Facility: CLINIC | Age: 68
End: 2023-10-17
Payer: MEDICARE

## 2023-10-17 VITALS
TEMPERATURE: 98 F | DIASTOLIC BLOOD PRESSURE: 77 MMHG | RESPIRATION RATE: 18 BRPM | SYSTOLIC BLOOD PRESSURE: 167 MMHG | HEIGHT: 67 IN | BODY MASS INDEX: 43.79 KG/M2 | HEART RATE: 81 BPM | WEIGHT: 279 LBS | OXYGEN SATURATION: 96 %

## 2023-10-17 DIAGNOSIS — R31.29 HEMATURIA, MICROSCOPIC: ICD-10-CM

## 2023-10-17 DIAGNOSIS — N45.1 RIGHT EPIDIDYMITIS: ICD-10-CM

## 2023-10-17 DIAGNOSIS — N50.82 SCROTAL PAIN: ICD-10-CM

## 2023-10-17 DIAGNOSIS — N13.30 HYDRONEPHROSIS OF RIGHT KIDNEY: ICD-10-CM

## 2023-10-17 DIAGNOSIS — N40.1 BPH WITH URINARY OBSTRUCTION: ICD-10-CM

## 2023-10-17 DIAGNOSIS — N13.8 BPH WITH URINARY OBSTRUCTION: ICD-10-CM

## 2023-10-17 DIAGNOSIS — R10.9 RIGHT FLANK PAIN: Primary | ICD-10-CM

## 2023-10-17 LAB
ALBUMIN SERPL BCP-MCNC: 2.3 G/DL (ref 3.5–5.2)
ALBUMIN SERPL BCP-MCNC: 2.4 G/DL (ref 3.5–5.2)
ALDOST SERPL-MCNC: 13.9 NG/DL
ALDOST/RENIN PLAS-RTO: 34.8 RATIO
ALP SERPL-CCNC: 114 U/L (ref 55–135)
ALP SERPL-CCNC: 114 U/L (ref 55–135)
ALT SERPL W/O P-5'-P-CCNC: 13 U/L (ref 10–44)
ALT SERPL W/O P-5'-P-CCNC: 16 U/L (ref 10–44)
ANION GAP SERPL CALC-SCNC: 6 MMOL/L (ref 8–16)
ANION GAP SERPL CALC-SCNC: 7 MMOL/L (ref 8–16)
AST SERPL-CCNC: 24 U/L (ref 10–40)
AST SERPL-CCNC: 24 U/L (ref 10–40)
BACTERIA UR CULT: NO GROWTH
BILIRUB SERPL-MCNC: 0.1 MG/DL (ref 0.1–1)
BILIRUB SERPL-MCNC: 0.2 MG/DL (ref 0.1–1)
BUN SERPL-MCNC: 24 MG/DL (ref 8–23)
BUN SERPL-MCNC: 26 MG/DL (ref 8–23)
CALCIUM SERPL-MCNC: 7.8 MG/DL (ref 8.7–10.5)
CALCIUM SERPL-MCNC: 8 MG/DL (ref 8.7–10.5)
CHLORIDE SERPL-SCNC: 116 MMOL/L (ref 95–110)
CHLORIDE SERPL-SCNC: 117 MMOL/L (ref 95–110)
CO2 SERPL-SCNC: 17 MMOL/L (ref 23–29)
CO2 SERPL-SCNC: 19 MMOL/L (ref 23–29)
CREAT SERPL-MCNC: 3.1 MG/DL (ref 0.5–1.4)
CREAT SERPL-MCNC: 3.5 MG/DL (ref 0.5–1.4)
EST. GFR  (NO RACE VARIABLE): 18.1 ML/MIN/1.73 M^2
EST. GFR  (NO RACE VARIABLE): 21 ML/MIN/1.73 M^2
GLUCOSE SERPL-MCNC: 130 MG/DL (ref 70–110)
GLUCOSE SERPL-MCNC: 248 MG/DL (ref 70–110)
POCT GLUCOSE: 135 MG/DL (ref 70–110)
POTASSIUM SERPL-SCNC: 4.8 MMOL/L (ref 3.5–5.1)
POTASSIUM SERPL-SCNC: 4.8 MMOL/L (ref 3.5–5.1)
PROT SERPL-MCNC: 5.4 G/DL (ref 6–8.4)
PROT SERPL-MCNC: 5.5 G/DL (ref 6–8.4)
RENIN PLAS-CCNC: 0.4 NG/ML/HR
SODIUM SERPL-SCNC: 140 MMOL/L (ref 136–145)
SODIUM SERPL-SCNC: 142 MMOL/L (ref 136–145)

## 2023-10-17 PROCEDURE — 99239 HOSP IP/OBS DSCHRG MGMT >30: CPT | Mod: ,,, | Performed by: HOSPITALIST

## 2023-10-17 PROCEDURE — 99232 SBSQ HOSP IP/OBS MODERATE 35: CPT | Mod: ,,, | Performed by: INTERNAL MEDICINE

## 2023-10-17 PROCEDURE — 25000003 PHARM REV CODE 250

## 2023-10-17 PROCEDURE — 36415 COLL VENOUS BLD VENIPUNCTURE: CPT | Performed by: STUDENT IN AN ORGANIZED HEALTH CARE EDUCATION/TRAINING PROGRAM

## 2023-10-17 PROCEDURE — 25000003 PHARM REV CODE 250: Performed by: HOSPITALIST

## 2023-10-17 PROCEDURE — 63600175 PHARM REV CODE 636 W HCPCS: Performed by: HOSPITALIST

## 2023-10-17 PROCEDURE — 99232 PR SUBSEQUENT HOSPITAL CARE,LEVL II: ICD-10-PCS | Mod: ,,, | Performed by: INTERNAL MEDICINE

## 2023-10-17 PROCEDURE — 80053 COMPREHEN METABOLIC PANEL: CPT | Performed by: STUDENT IN AN ORGANIZED HEALTH CARE EDUCATION/TRAINING PROGRAM

## 2023-10-17 PROCEDURE — 99239 PR HOSPITAL DISCHARGE DAY,>30 MIN: ICD-10-PCS | Mod: ,,, | Performed by: HOSPITALIST

## 2023-10-17 RX ORDER — ACETAMINOPHEN 325 MG/1
650 TABLET ORAL EVERY 8 HOURS PRN
Qty: 30 TABLET | Refills: 0 | Status: SHIPPED | OUTPATIENT
Start: 2023-10-17

## 2023-10-17 RX ORDER — HYDRALAZINE HYDROCHLORIDE 25 MG/1
25 TABLET, FILM COATED ORAL EVERY 8 HOURS
Qty: 90 TABLET | Refills: 11 | Status: SHIPPED | OUTPATIENT
Start: 2023-10-17 | End: 2024-01-04 | Stop reason: SDUPTHER

## 2023-10-17 RX ORDER — AMLODIPINE BESYLATE 10 MG/1
10 TABLET ORAL DAILY
Qty: 30 TABLET | Refills: 11 | Status: SHIPPED | OUTPATIENT
Start: 2023-10-18 | End: 2024-01-04 | Stop reason: SDUPTHER

## 2023-10-17 RX ORDER — HYDRALAZINE HYDROCHLORIDE 25 MG/1
25 TABLET, FILM COATED ORAL EVERY 8 HOURS
Status: DISCONTINUED | OUTPATIENT
Start: 2023-10-17 | End: 2023-10-17 | Stop reason: HOSPADM

## 2023-10-17 RX ORDER — INSULIN ASPART 100 [IU]/ML
0-5 INJECTION, SOLUTION INTRAVENOUS; SUBCUTANEOUS
Qty: 5 ML | Refills: 0 | Status: SHIPPED | OUTPATIENT
Start: 2023-10-17 | End: 2023-10-31

## 2023-10-17 RX ORDER — CIPROFLOXACIN 500 MG/1
500 TABLET ORAL EVERY 12 HOURS
Qty: 28 TABLET | Refills: 0 | Status: SHIPPED | OUTPATIENT
Start: 2023-10-17 | End: 2023-10-31

## 2023-10-17 RX ORDER — INSULIN ASPART 100 [IU]/ML
5 INJECTION, SOLUTION INTRAVENOUS; SUBCUTANEOUS 3 TIMES DAILY
Qty: 4.5 ML | Refills: 11 | Status: SHIPPED | OUTPATIENT
Start: 2023-10-17 | End: 2023-10-31

## 2023-10-17 RX ADMIN — AMLODIPINE BESYLATE 10 MG: 10 TABLET ORAL at 09:10

## 2023-10-17 RX ADMIN — SODIUM CHLORIDE: 9 INJECTION, SOLUTION INTRAVENOUS at 09:10

## 2023-10-17 RX ADMIN — INSULIN ASPART 5 UNITS: 100 INJECTION, SOLUTION INTRAVENOUS; SUBCUTANEOUS at 09:10

## 2023-10-17 RX ADMIN — HEPARIN SODIUM 5000 UNITS: 5000 INJECTION INTRAVENOUS; SUBCUTANEOUS at 05:10

## 2023-10-17 NOTE — DISCHARGE SUMMARY
"[facility] - Intensive Care ([place] [address])  [facility] Medicine  Discharge Summary      Patient Name: Jian Arrieta  MRN: [id number]  SRIKANTH: [id number]  Patient Class: IP- Inpatient  Admission Date: 10/12/2023  Hospital Length of Stay: 5 days  Discharge Date and Time: No discharge date for patient encounter.  Attending Physician: Sharla Duran MD   Discharging Provider: Sharla Duran MD  Primary Care Provider: Leon Barajas [facility] Medicine Team: [facility] Sharla Duran MD  Primary Care Team: [facility]    HPI:   67 yo M with PMHx MARIE (liver bx 3/2019 w/o cirrhosis), hx of sleeve gastrectomy, CAD s/p CABG, and CKD3 w/ hx of obstructive nephrolithiasis who presents to the ED from nephrology clinic for worsening renal function for last 2 weeks. Pt. Baseline Cr ~2.2, but he recently presented to [facility] ED with B/L flank pain. Cr wasmildly elevated at 2.56 and CT renal stone revealed "mild fullness of the proximal R renal collecting system without evidence of obstructing stone and surrounding perinephric stranding." Pt. Was discharged from the ED with PO doxycycline to treat possible pyelonephritis although U/A was LE negative and not sent for culture. he obtained nephrology f/u yesterday, and on repeat labs Cr noted to be 4.7 and K+ 5.2., so he was referred to the ED. He reports some persistent flank pain, but it has improved. He denies any fevers, chills, nausea, or vomiting. No reported changes in urine output, dysuria, or hematuria. On ED arrival, pt. Noted to have  and pt. Reports that his omnipod Dash - Insulin pump must be malfunctioning, although he is not sure when it stopped working. Labs 3 days ago show normal BG.      Procedure(s) (LRB):  CYSTOSCOPY (N/A)  PYELOGRAM, RETROGRADE (Right)  INSERTION, STENT, URETER (Right)  URETEROSCOPY (Right)  PYELOSCOPY (Right)      Hospital Course:   10/13- admitted for uncontrollled diabetes, acute renal failure, obstructive nephropathy. " Recently treated for pyelo w doxycycline. /79   Pulse 97  . Cr 4.9-> 4.5 BL 2.1.  -> 340. Received NS. UO- 300 cc.  One BM this am.  Nurse reports leg hot and red, photos and put in file - exam consistent w stasis dermatitis. AF. Started empiric clindamycin, this am, and discontinued.  continue NS x one liter.  Urology to place stent in am.   Cxr - mild pulm edema  CT abd - Mild right hydronephrosis and right hydroureter with gradual tapering without evidence for ureteral calculus, these findings may relate to sequelae of recently passed calculus, clinical and historical correlation is needed.  Mild perinephric stranding bilaterally, nonspecific however correlation for UTI/pyelonephritis is needed.  Mild urinary bladder wall thickening may relate to incomplete distention however correlation for UTI/cystitis is needed.  Mild circumferential thickening of the distal descending colon/proximal sigmoid colon with mild pericolonic haziness, correlation for mild colitis to include mild diverticulitis is needed.  Right adrenal nodule likely representing adrenal adenoma.  10/14- appreciate Urology f/u - no need for stent because renal function improving. Renal US showing mild right hydronephrosis. Cr 4.5-> 3.7  baseline around 2.2. Continuing IVFs.  follow up with Urology- Dr. Diaz outpatient for MAG3. Resume diabetic diet. Endocrine following.  BS 92 this am.    10/15- Cr 3.8 from 3.9 from 4.5, baseline around 2.2.  NPO at midnight for possible stent tomorrow. Appreciate Urology f/u.   10/16-  lab cr 3.5.  NPO.   10/17- stent placed successfully yesterday. Eating. Cr 3.1 continues to improve. Will dc to home today. F/u Urology and int med.              Goals of Care Treatment Preferences:  Code Status: Full Code      Consults:   Consults (From admission, onward)        Status Ordering Provider     Inpatient consult to Endocrinology  Once        Provider:  (Not yet assigned)    Completed JOAQUIN GRANADOS      Inpatient consult to Urology  Once        Provider:  (Not yet assigned)    Completed JOAQUIN GRANADOS     IP consult to case management  Once        Provider:  (Not yet assigned)    Acknowledged JOAQUIN GRANADOS     Inpatient consult to Nephrology  Once        Provider:  (Not yet assigned)    Completed ROCIO LUNA          Cardiac/Vascular  Hyperlipidemia associated with type 2 diabetes mellitus  Not on lipid therapy at home      Paroxysmal atrial fibrillation  Hx of  Not on anticoagulation    Venous stasis dermatitis of both lower extremities  exam not consistent with cellulitis  Recent course of doxycycline as outpt.   Empiric clindamycin IV.- stopped  IVFs- one liter tonight  10/15- still on IVFs, cr 3.8.   STABLE      Acute on chronic combined systolic and diastolic heart failure  Hx of EF 30 %  ECHO 6/19:   Mildly decreased left ventricular systolic function. The estimated ejection fraction is 45-50%   Left ventricular diastolic dysfunction.   Normal right ventricular systolic function.   Normal central venous pressure (3 mm Hg).   Extremely technically challenging study, poor endocardial visualization, would recommend repeating with contrast if additional information is desired.    10/13- dc IVF until echo returns and Nephrology see pt.   ECHO 10/13:   Left Ventricle: The left ventricle is normal in size. Normal wall thickness. Septal motion is consistent with post-operative status. There is low normal systolic function with a visually estimated ejection fraction of 50 - 55%. There is normal diastolic function.    Right Ventricle: Normal right ventricular cavity size. Wall thickness is normal. Right ventricle wall motion  is normal. Systolic function is normal.    Pulmonary Artery: The estimated pulmonary artery systolic pressure is 21 mmHg.    IVC/SVC: Normal venous pressure at 3 mmHg.    10/15- STABLE, On NS 125cc/h  10/17- tolerated fluids this admissiion.     Hypertensive emergency  221/94 upon  admit  Hypertensive Emergency present on admission, with ARF  reports not taking any HTN medications. Plan to start scheduled amlodipine, hydralazine ordered PRN. Monitor BG and adjust as needed    10/13- /79 on norvasc. ->  176/83 . Prn hydralazine po is ordered   10/14- /65 received one dose of hydralazine yesterday  10/15- not requiring prns. /76   10/17- BP  167/77. Will schedule hydralazine 25 q 8. F/u PCP. May need future adjustments.     Renal/  * ELIEZER (acute kidney injury)  -Cr 4.7, baseline ~2.2. Imaging shows Mild right hydronephrosis and right hydroureter with gradual tapering and FENa 8% consistent with post-obstructive etiology. Urology consult for assistance  -IV fluids, avoid nephrotoxins and continue to trend Cr    10/13- Nephrology and urology were consulted May be due to hypertensive emergency in the setting of chronic kidney disease.  Monitoring UO.- only 300 cc last night. Post void residual = 0. Had a loose stool.  Consider cardiorenal syndrome. Will get ECHO. Urology planning on stent placement in am.     10/14- continue IVFs today. ECHO was NL.  Treat BP.   10/15- UO= 1050, on  cc/ h. Cr 3.8- no change. Possible Ureter stent placement in am.   10/17 - cr 3.1 IMPROVING, tolerating fluids and meals. Will dc to home. F/u Urology and Ont med as planned    Diabetic nephropathy associated with type 2 diabetes mellitus  See above    Hydronephrosis  Radiolucent stone seen in distal ureter on ureteroscopy 10/16/23. S/p right ureteral stent placement 10/16/23.  - will place on empiric antibiotics for 2 weeks.       Chronic kidney disease, stage 3  BL cr 2.1      Endocrine  Insulin pump status  Endocrine consulted  Pump is off.  Will attempt to place new pod and restart pump 10/14.      Class 3 obesity  daibetic diet      Type 2 diabetes mellitus with diabetic neuropathy, with long-term current use of insulin  -Pt. Hyperglycemic to >500 on presentation, suspect acute insulin pump  malfunction as BG normal 3 days ago and A1c 7.6  -Levemir 20 units QHS and SSI ordered with diabetic/renal diet. Endocrinology consulted for assistance  -Has insulin pump, which is turned off at this time.    - Hypoglycemic protocol    10/13- BS improving. OR for cystoscopy and stent placement in am.  May attempt to place new pod and restart pump tomorrow.  Recent Labs     10/15/23  2333 10/16/23  0748 10/16/23  1213 10/16/23  1522 10/16/23  1935 10/17/23  0744   POCTGLUCOSE 197* 82 93 88 198* 135*     IVFs today, if continues to improve may dc tomorrow or Mionday    10/17- pt will FU w endocrine to resume insulin pump upon discharge. Sensor device needs to be fixed so that it communicates with pump effectively.  Currently ion levemir 18 hs, and aspart 5/5/5.     GI  History of colon polyps  XT abd reveals thickened colon  F/u GI for colonoscopy  - only one stool yesterday      Other cirrhosis of liver  -Chronic MARIE, no acute issues, compensated        Final Active Diagnoses:    Diagnosis Date Noted POA    PRINCIPAL PROBLEM:  ELIEZER (acute kidney injury) [N17.9] 10/13/2023 Yes    Hypertensive emergency [I16.1] 05/06/2019 Yes    Type 2 diabetes mellitus with diabetic neuropathy, with long-term current use of insulin [E11.40, Z79.4] 02/04/2019 Not Applicable     Chronic    Acute on chronic combined systolic and diastolic heart failure [I50.43] 01/29/2019 Yes    Venous stasis dermatitis of both lower extremities [I87.2] 10/13/2023 Yes    Other cirrhosis of liver [K74.69]  Yes    Hydronephrosis [N13.30] 10/16/2023 Yes    Diabetic nephropathy associated with type 2 diabetes mellitus [E11.21] 10/16/2023 Yes    Nephrotic range proteinuria [R80.9] 10/16/2023 Yes    Insulin pump status [Z96.41] 10/13/2023 Not Applicable    History of colon polyps [Z86.010] 01/13/2022 Not Applicable    Class 3 obesity [E66.01] 03/15/2021 Yes    Chronic kidney disease, stage 3 [N18.30] 07/23/2019 Yes     Chronic    Hyperlipidemia  associated with type 2 diabetes mellitus [E11.69, E78.5] 05/06/2019 Yes     Chronic    Paroxysmal atrial fibrillation [I48.0] 03/16/2019 Yes      Problems Resolved During this Admission:       Discharged Condition: good    Disposition:     Follow Up:    Patient Instructions:      Ambulatory referral/consult to Urology   Standing Status: Future   Referral Priority: Routine Referral Type: Consultation   Referral Reason: Specialty Services Required   Requested Specialty: Urology   Number of Visits Requested: 1     Ambulatory referral/consult to Endocrinology   Standing Status: Future   Referral Priority: Routine Referral Type: Consultation   Requested Specialty: Endocrinology   Number of Visits Requested: 1     Ambulatory referral/consult to Internal Medicine   Standing Status: Future   Referral Priority: Routine Referral Type: Consultation   Referral Reason: Specialty Services Required   Requested Specialty: Internal Medicine   Number of Visits Requested: 1       Significant Diagnostic Studies: Labs:   CMP   Recent Labs   Lab 10/16/23  0705 10/16/23  2355 10/17/23  0745    140 142   K 4.3 4.8 4.8   * 116* 117*   CO2 17* 17* 19*   GLU 83 248* 130*   BUN 28* 26* 24*   CREATININE 3.5* 3.5* 3.1*   CALCIUM 7.7* 7.8* 8.0*   PROT  --  5.4* 5.5*   ALBUMIN  --  2.4* 2.3*   BILITOT  --  0.1 0.2   ALKPHOS  --  114 114   AST  --  24 24   ALT  --  16 13   ANIONGAP 7* 7* 6*    and CBC   Recent Labs   Lab 10/16/23  1424   WBC 5.82   HGB 12.3*   HCT 39.0*   *       Pending Diagnostic Studies:     Procedure Component Value Units Date/Time    Renin [[id number]] Collected: 10/13/23 1414    Order Status: Sent Lab Status: In process Updated: 10/13/23 1428    Specimen: Blood     Narrative:      Collection has been rescheduled by VS1 at 10/13/2023 12:09 Reason:   Patient unavailable went to ultrasound spoke with Nurse Bushra  Collection has been rescheduled by VS1 at 10/13/2023 12:09 Reason:   Patient unavailable went to  ultrasound spoke with Nurse Tomlinson         Medications:  Reconciled Home Medications:      Medication List      START taking these medications    acetaminophen 325 MG tablet  Commonly known as: TYLENOL  Take 2 tablets (650 mg total) by mouth every 8 (eight) hours as needed for Pain.     amLODIPine 10 MG tablet  Commonly known as: NORVASC  Take 1 tablet (10 mg total) by mouth once daily.  Start taking on: October 18, 2023     ciprofloxacin HCl 500 MG tablet  Commonly known as: CIPRO  Take 1 tablet (500 mg total) by mouth every 12 (twelve) hours. for 14 days     hydrALAZINE 25 MG tablet  Commonly known as: APRESOLINE  Take 1 tablet (25 mg total) by mouth every 8 (eight) hours.     * insulin aspart U-100 100 unit/mL (3 mL) Inpn pen  Commonly known as: NovoLOG  Inject 0-5 Units into the skin before meals and at bedtime as needed (Hyperglycemia).     * insulin aspart U-100 100 unit/mL (3 mL) Inpn pen  Commonly known as: NovoLOG  Inject 5 Units into the skin 3 (three) times daily.     insulin detemir U-100 (Levemir) 100 unit/mL (3 mL) Inpn pen  Inject 18 Units into the skin every evening.  Replaces: TOUJEO SOLOSTAR U-300 INSULIN 300 unit/mL (1.5 mL) Inpn pen         * This list has 2 medication(s) that are the same as other medications prescribed for you. Read the directions carefully, and ask your doctor or other care provider to review them with you.            CONTINUE taking these medications    aspirin 81 MG EC tablet  Commonly known as: ECOTRIN  Take 1 tablet (81 mg total) by mouth once daily.     BAQSIMI 3 mg/actuation Spry  Generic drug: glucagon  1 each by Nasal route daily as needed (if glucose is less than 70 - can repeat in 1 hour if still low/less than 70).     DEXCOM G6 SENSOR Sandi  Generic drug: blood-glucose sensor  Inject 1 each into the skin every 10 days.     DEXCOM G6 TRANSMITTER Sandi  Generic drug: blood-glucose transmitter  Inject 1 each into the skin every 10 days.     OMNIPOD 5 G6 PODS (GEN 5)  "Crtg  Generic drug: insulin pump cart,automated,BT  Inject 1 each into the skin every other day. Use with omnipod 5 pdm as directed.     pen needle, diabetic 32 gauge x 5/32" Ndle  Use with toujeo insulin one daily only as Emergency use/back up insulin - if OFF OF your insulin pump.     tamsulosin 0.4 mg Cap  Commonly known as: FLOMAX  Take 1 capsule (0.4 mg total) by mouth every evening.        STOP taking these medications    doxycycline 100 MG Cap  Commonly known as: VIBRAMYCIN     HumaLOG U-100 Insulin 100 unit/mL injection  Generic drug: insulin lispro     LYUMJEV U-100 INSULIN 100 unit/mL  Generic drug: insulin lispro-aabc     TOUJEO SOLOSTAR U-300 INSULIN 300 unit/mL (1.5 mL) Inpn pen  Generic drug: insulin glargine (TOUJEO)  Replaced by: insulin detemir U-100 (Levemir) 100 unit/mL (3 mL) Inpn pen            Indwelling Lines/Drains at time of discharge:   Lines/Drains/Airways     Drain  Duration                Ureteral Drain/Stent 06/16/21 0912 Right ureter 24 Fr. 853 days                Time spent on the discharge of patient: 35 minutes         Sharla Duran MD  Department of Hospital Medicine  [facility] - Intensive Care ([place] [address])  "

## 2023-10-17 NOTE — SUBJECTIVE & OBJECTIVE
Interval History: POD1 cystoscopy + stent placement with urology yesterday. Adequate UOP (2L/ 24h). Cr continues to improve. Patient w/o complaints at this time.     Review of patient's allergies indicates:  No Known Allergies  Current Facility-Administered Medications   Medication Frequency    0.9%  NaCl infusion Continuous    acetaminophen tablet 650 mg Q4H PRN    amLODIPine tablet 10 mg Daily    dextrose 10% bolus 125 mL 125 mL PRN    dextrose 10% bolus 125 mL 125 mL PRN    dextrose 10% bolus 250 mL 250 mL PRN    dextrose 10% bolus 250 mL 250 mL PRN    glucagon (human recombinant) injection 1 mg PRN    glucose chewable tablet 16 g PRN    glucose chewable tablet 16 g PRN    glucose chewable tablet 24 g PRN    glucose chewable tablet 24 g PRN    heparin (porcine) injection 5,000 Units Q8H    hydrALAZINE tablet 25 mg Q8H    insulin aspart U-100 pen 0-5 Units QID (AC + HS) PRN    insulin aspart U-100 pen 5 Units TIDWM    insulin detemir U-100 (Levemir) pen 18 Units QHS    melatonin tablet 6 mg Nightly PRN    naloxone 0.4 mg/mL injection 0.02 mg PRN    ondansetron injection 4 mg Q8H PRN    prochlorperazine injection Soln 5 mg Q6H PRN    sodium chloride 0.65 % nasal spray 1 spray PRN    sodium chloride 0.9% flush 10 mL PRN    tamsulosin 24 hr capsule 0.4 mg QHS       Objective:     Vital Signs (Most Recent):  Temp: 97.8 °F (36.6 °C) (10/17/23 0815)  Pulse: 81 (10/17/23 0815)  Resp: 18 (10/17/23 0815)  BP: (!) 167/77 (10/17/23 0815)  SpO2: 96 % (10/17/23 0815) Vital Signs (24h Range):  Temp:  [97.6 °F (36.4 °C)-98.8 °F (37.1 °C)] 97.8 °F (36.6 °C)  Pulse:  [64-91] 81  Resp:  [11-18] 18  SpO2:  [96 %-100 %] 96 %  BP: ()/(55-84) 167/77     Weight: 126.6 kg (279 lb) (10/13/23 1025)  Body mass index is 43.7 kg/m².  Body surface area is 2.45 meters squared.    I/O last 3 completed shifts:  In: 620 [P.O.:120; IV Piggyback:500]  Out: 1975 [Urine:1975]     Physical Exam  Vitals and nursing note reviewed.   Constitutional:        General: He is not in acute distress.     Appearance: He is not ill-appearing, toxic-appearing or diaphoretic.   HENT:      Head: Normocephalic and atraumatic.      Right Ear: External ear normal.      Left Ear: External ear normal.      Nose: Nose normal.      Mouth/Throat:      Mouth: Mucous membranes are moist.      Pharynx: Oropharynx is clear.   Eyes:      General: No scleral icterus.     Extraocular Movements: Extraocular movements intact.      Conjunctiva/sclera: Conjunctivae normal.      Pupils: Pupils are equal, round, and reactive to light.   Cardiovascular:      Rate and Rhythm: Normal rate and regular rhythm.      Pulses: Normal pulses.      Heart sounds: Normal heart sounds.   Pulmonary:      Effort: Pulmonary effort is normal. No respiratory distress.   Abdominal:      General: There is no distension.      Tenderness: There is no abdominal tenderness. There is no right CVA tenderness, left CVA tenderness, guarding or rebound.   Musculoskeletal:         General: No swelling or deformity. Normal range of motion.      Cervical back: Normal range of motion.      Right lower leg: Edema present.      Left lower leg: Edema present.   Skin:     General: Skin is warm and dry.      Coloration: Skin is not jaundiced.   Neurological:      General: No focal deficit present.      Mental Status: He is alert and oriented to person, place, and time. Mental status is at baseline.      Motor: No weakness.          Significant Labs:  CBC:   Recent Labs   Lab 10/16/23  1424   WBC 5.82   RBC 4.18*   HGB 12.3*   HCT 39.0*   *   MCV 93   MCH 29.4   MCHC 31.5*     CMP:   Recent Labs   Lab 10/17/23  0745   *   CALCIUM 8.0*   ALBUMIN 2.3*   PROT 5.5*      K 4.8   CO2 19*   *   BUN 24*   CREATININE 3.1*   ALKPHOS 114   ALT 13   AST 24   BILITOT 0.2     All labs within the past 24 hours have been reviewed.     Significant Imaging:  Labs: Reviewed

## 2023-10-17 NOTE — ASSESSMENT & PLAN NOTE
"Creatinine 4.7 on admit, baseline around 2.2. Improving, 3.1 today.     Jian Arrieta is a 68M w/ MARIE (liver bx 3/2019 w/o cirrhosis), s/p sleeve gastrectomy, CAD s/p CABG, CKD3 (BL creatinine 2.2), and history of obstructive nephrolithiasis who presents from nephrology clinic w/ worsening renal function x 2 weeks. Recent admission to  w/ nephrolithiasis of R kidney. Was discharged from  w/ doxycycline out of concern for pyelonephritis. In clinic, creatinine was noted to be significantly elevated to 4.7 prompting ED referral. On arrival, reported improving but persistent flank pain. Denies any subjective fevers/chills/N/V. Denies changes in urine output, dysuria, or hematuria. Of note, blood glucose 516 on arrival. Nephrology consulted for "acute on chronic kidney failure."    Lab Results   Component Value Date    CREATININE 3.1 (H) 10/17/2023     Estimated Creatinine Clearance: 28.7 mL/min (A) (based on SCr of 3.1 mg/dL (H)).    CT A/P:   "- Mild right hydronephrosis and right hydroureter with gradual tapering without evidence for ureteral calculus, these findings may relate to sequelae of recently passed calculus, clinical and historical correlation is needed.   - Mild perinephric stranding bilaterally, nonspecific however correlation for UTI/pyelonephritis is needed."    TTE with EF 50-55%, normal diastolic function, no evidence of pHTN, CVP 3.    Renal U/S with mild R hydronephrosis w/o evidence of nephrolithiasis.     UA: 3+ protein, 4+ glucose, nitrite (-), RBC 15, WBC 11, rare bacteria  Urine chloride 80  Urine creatinine 36.0  Urine microalbumin >5k  Urine protein 781  UPCR 4.29  Urine Na 81    Urology op note 10/16/23:  1. Right retrograde pyelogram with questionable filling defect at right UPJ  2. Right ureteroscopy with radiolucent stone identified at right UPJ  3. Right pyeloscopy revealed turbid urine and additional radiolucent stone burden in the right kidney  4. Right ureteral stent placed with " good coils on fluoroscopy and direct vision in the bladder    Etiology of Acute component superimposed on CKD3: Suspect etiology is multifactorial including ongoing bilateral pyelonephritis, hypertensive emergency and nephrolithiasis in the setting of chronic kidney disease. Resolved with mIVF, patient with ureteral stent placement per urology. Patient likely has diabetic nephropathy with proteinuria and h/o A1c to 11.5% one year ago, most recent 7.6%. Would consider starting a SGLT2 inhibitor.     Plan:  - Trend RFP daily  - Strict I&Os and daily weights   - considered gentle IVF but c/f possible volume overload   - Avoid nephrotoxic agents such as NSAIDs, gadolinium and IV radiocontrast  - Renally dose meds to current GFR  - Maintain MAP > 65  - f/u renin, aldosterone, aldosterone/renin ratio

## 2023-10-17 NOTE — PLAN OF CARE
Problem: Adult Inpatient Plan of Care  Goal: Plan of Care Review  Outcome: Ongoing, Progressing  Goal: Patient-Specific Goal (Individualized)  Outcome: Ongoing, Progressing  Goal: Absence of Hospital-Acquired Illness or Injury  Outcome: Ongoing, Progressing  Goal: Optimal Comfort and Wellbeing  Outcome: Ongoing, Progressing  Goal: Readiness for Transition of Care  Outcome: Ongoing, Progressing     Problem: Bariatric Environmental Safety  Goal: Safety Maintained with Care  Outcome: Ongoing, Progressing     Problem: Diabetes Comorbidity  Goal: Blood Glucose Level Within Targeted Range  Outcome: Ongoing, Progressing     Problem: Fluid and Electrolyte Imbalance (Acute Kidney Injury/Impairment)  Goal: Fluid and Electrolyte Balance  Outcome: Ongoing, Progressing     Problem: Oral Intake Inadequate (Acute Kidney Injury/Impairment)  Goal: Optimal Nutrition Intake  Outcome: Ongoing, Progressing     Problem: Renal Function Impairment (Acute Kidney Injury/Impairment)  Goal: Effective Renal Function  Outcome: Ongoing, Progressing     Problem: Hypertension Acute  Goal: Blood Pressure Within Desired Range  Outcome: Ongoing, Progressing     Problem: Skin Injury Risk Increased  Goal: Skin Health and Integrity  Outcome: Ongoing, Progressing

## 2023-10-17 NOTE — TELEPHONE ENCOUNTER
Looks like patient is hospitalized, is currently having testing/being treated.   Will you see if you can schedule him for a follow up with me for his diabetes - once he is out of the hospital? You can pend this message and likely call to schedule in a few days - once he is discharged from hospital.    I looked and he was still undergoing testing for his kidneys and following pretty closely with urology for other issues.     He had some issues with using his pump/and was not clear on his insulin pump back up plan - it is all reviewed at time of his visit and in his after visit summary for review/referral. But he had several issues with comprehension, so just needs reinforcement is all - follow up visit would be great - he is a type 1 on pump therapy.     Thanks,  Ivanna

## 2023-10-17 NOTE — ASSESSMENT & PLAN NOTE
-Pt. Hyperglycemic to >500 on presentation, suspect acute insulin pump malfunction as BG normal 3 days ago and A1c 7.6  -Levemir 20 units QHS and SSI ordered with diabetic/renal diet. Endocrinology consulted for assistance  -Has insulin pump, which is turned off at this time.    - Hypoglycemic protocol    10/13- BS improving. OR for cystoscopy and stent placement in am.  May attempt to place new pod and restart pump tomorrow.  Recent Labs     10/15/23  2333 10/16/23  0748 10/16/23  1213 10/16/23  1522 10/16/23  1935 10/17/23  0744   POCTGLUCOSE 197* 82 93 88 198* 135*     IVFs today, if continues to improve may dc tomorrow or Mionday    10/17- pt will FU w endocrine to resume insulin pump upon discharge. Sensor device needs to be fixed so that it communicates with pump effectively.  Currently ion levemir 18 hs, and aspart 5/5/5.

## 2023-10-17 NOTE — ASSESSMENT & PLAN NOTE
Hx of EF 30 %  ECHO 6/19:   Mildly decreased left ventricular systolic function. The estimated ejection fraction is 45-50%   Left ventricular diastolic dysfunction.   Normal right ventricular systolic function.   Normal central venous pressure (3 mm Hg).   Extremely technically challenging study, poor endocardial visualization, would recommend repeating with contrast if additional information is desired.    10/13- dc IVF until echo returns and Nephrology see pt.   ECHO 10/13:   Left Ventricle: The left ventricle is normal in size. Normal wall thickness. Septal motion is consistent with post-operative status. There is low normal systolic function with a visually estimated ejection fraction of 50 - 55%. There is normal diastolic function.    Right Ventricle: Normal right ventricular cavity size. Wall thickness is normal. Right ventricle wall motion  is normal. Systolic function is normal.    Pulmonary Artery: The estimated pulmonary artery systolic pressure is 21 mmHg.    IVC/SVC: Normal venous pressure at 3 mmHg.    10/15- STABLE, On NS 125cc/h  10/17- tolerated fluids this admissiion.

## 2023-10-17 NOTE — ASSESSMENT & PLAN NOTE
exam not consistent with cellulitis  Recent course of doxycycline as outpt.   Empiric clindamycin IV.- stopped  IVFs- one liter tonight  10/15- still on IVFs, cr 3.8.   STABLE

## 2023-10-17 NOTE — PLAN OF CARE
[facility] - [facility] ([facility])  Discharge Final Note    Primary Care Provider: Leon Barajas DO    Expected Discharge Date: 10/17/2023    Final Discharge Note (most recent)       Final Note - 10/17/23 1437          Final Note    Assessment Type Final Discharge Note (P)      Anticipated Discharge Disposition Home or Self Care (P)         Post-Acute Status    Discharge Delays None known at this time (P)                      Important Message from Medicare  Important Message from Medicare regarding Discharge Appeal Rights: Explained to patient/caregiver, Given to patient/caregiver, Signed/date by patient/caregiver     Date IMM was signed: 10/16/23  Time IMM was signed: 0810  Pt d/c'd to home.    GREGORY GenaoN, BS, RN, CCM

## 2023-10-17 NOTE — SUBJECTIVE & OBJECTIVE
Interval History: see above    Review of Systems   Constitutional:  Positive for activity change. Negative for appetite change and fever.   HENT:  Negative for trouble swallowing.    Respiratory:  Negative for cough and shortness of breath.    Cardiovascular:  Positive for leg swelling. Negative for chest pain.   Gastrointestinal:  Positive for abdominal distention. Negative for blood in stool, diarrhea, nausea and vomiting.   Genitourinary:  Negative for difficulty urinating.   Musculoskeletal:  Negative for arthralgias, gait problem and neck stiffness. Back pain: falnks, bilat (PTA).  Skin:  Positive for rash (chronicnLE erythema due to venous stasis).   Neurological:  Negative for dizziness and headaches.   Psychiatric/Behavioral:  Negative for agitation, behavioral problems and confusion.      Objective:     Vital Signs (Most Recent):  Temp: 97.8 °F (36.6 °C) (10/17/23 0815)  Pulse: 81 (10/17/23 0815)  Resp: 18 (10/17/23 0815)  BP: (!) 167/77 (10/17/23 0815)  SpO2: 96 % (10/17/23 0815) Vital Signs (24h Range):  Temp:  [97.6 °F (36.4 °C)-98.8 °F (37.1 °C)] 97.8 °F (36.6 °C)  Pulse:  [64-91] 81  Resp:  [11-18] 18  SpO2:  [96 %-100 %] 96 %  BP: ()/(55-84) 167/77     Weight: 126.6 kg (279 lb)  Body mass index is 43.7 kg/m².    Intake/Output Summary (Last 24 hours) at 10/17/2023 0945  Last data filed at 10/17/2023 0506  Gross per 24 hour   Intake 620 ml   Output 1975 ml   Net -1355 ml           Physical Exam  Constitutional:       General: He is not in acute distress.     Appearance: Normal appearance. He is obese. He is not ill-appearing, toxic-appearing or diaphoretic.   HENT:      Head: Normocephalic and atraumatic.      Nose: Nose normal.      Comments: rhynophyma     Mouth/Throat:      Mouth: Mucous membranes are moist.      Pharynx: Oropharynx is clear.   Eyes:      General: No scleral icterus.     Extraocular Movements: Extraocular movements intact.      Conjunctiva/sclera: Conjunctivae normal.       Pupils: Pupils are equal, round, and reactive to light.   Cardiovascular:      Rate and Rhythm: Normal rate and regular rhythm.      Pulses: Normal pulses.      Heart sounds: Normal heart sounds.   Pulmonary:      Effort: Pulmonary effort is normal. No respiratory distress.      Breath sounds: Normal breath sounds. No stridor. No wheezing, rhonchi or rales.   Chest:      Chest wall: No tenderness.   Abdominal:      General: Abdomen is flat. Bowel sounds are normal. There is no distension.      Palpations: Abdomen is soft.      Tenderness: There is no abdominal tenderness. There is no right CVA tenderness, left CVA tenderness, guarding or rebound.   Musculoskeletal:         General: Swelling (chronic LE) present. No tenderness, deformity or signs of injury. Normal range of motion.      Cervical back: Normal range of motion and neck supple. No rigidity or tenderness.      Right lower leg: Edema present.      Left lower leg: Edema present.   Lymphadenopathy:      Cervical: No cervical adenopathy.   Skin:     General: Skin is warm and dry.      Coloration: Skin is not jaundiced.      Findings: Rash (venous stasis dermatists, not hot to touch) present. No erythema.   Neurological:      General: No focal deficit present.      Mental Status: He is alert and oriented to person, place, and time. Mental status is at baseline.      Motor: No weakness.      Coordination: Coordination normal.      Gait: Gait normal.   Psychiatric:         Mood and Affect: Mood normal.         Behavior: Behavior normal.         Thought Content: Thought content normal.         Judgment: Judgment normal.             Significant Labs: All pertinent labs within the past 24 hours have been reviewed.  CBC:   Recent Labs   Lab 10/16/23  1424   WBC 5.82   HGB 12.3*   HCT 39.0*   *       CMP:   Recent Labs   Lab 10/16/23  0705 10/16/23  2355 10/17/23  0745    140 142   K 4.3 4.8 4.8   * 116* 117*   CO2 17* 17* 19*   GLU 83 248* 130*    BUN 28* 26* 24*   CREATININE 3.5* 3.5* 3.1*   CALCIUM 7.7* 7.8* 8.0*   PROT  --  5.4* 5.5*   ALBUMIN  --  2.4* 2.3*   BILITOT  --  0.1 0.2   ALKPHOS  --  114 114   AST  --  24 24   ALT  --  16 13   ANIONGAP 7* 7* 6*         Significant Imaging: I have reviewed all pertinent imaging results/findings within the past 24 hours.

## 2023-10-17 NOTE — PROGRESS NOTES
[facility]  Urology Progress Note  10/17/2023    Mr. Arrieta is a 67 yo male with MARIE (liver bx 3/2019 w/o cirrhosis), s/p sleeve gastrectomy, CAD s/p CABG, and CKD3 w/ hx of nephrolithiasis admitted to  from nephrology for worsening Cr. Urology consulted for right hydronephrosis.     History of R proximal stone s/p ureteroscopy in June 2021 with Dr. Diaz recently seen in clinic 10/9/23 with plans for an outpatient MAG3 scan vs cysto with R RPG. [place] [facility] [place], Cr noted to be 2.56 from baseline of 2.2.. UA nonconcerning for infection and did not reflex to culture, however, he was discharged from ED with doxy for possible pyelonephritis. Seen in nephrology clinic yesterday with Cr elevated to 4.7 and sent to [facility] ED.      Radiolucent stone seen in distal ureter on ureteroscopy 10/16/23. S/p right ureteral stent placement 10/16/23.    - Cr 3.1 from 3.5, baseline around 2  - Trend Cr, avoid nephrotoxic agents, avoid hypotension, and dose medications to patient's current GFR  - Recommend 2 weeks empiric antibiotics to cover possible UTI  - Follow up cultures    - Will arrange outpatient urology follow up with Dr. Diaz  - dannielle for discharge from urology perspective  - Rest of care per primary    Urology will sign off at this time. Please reach out with any questions or concerns.    Say Jj MD  [facility] - PGY3

## 2023-10-17 NOTE — ASSESSMENT & PLAN NOTE
Radiolucent stone seen in distal ureter on ureteroscopy 10/16/23. S/p right ureteral stent placement 10/16/23.  - will place on empiric antibiotics for 2 weeks.

## 2023-10-17 NOTE — ASSESSMENT & PLAN NOTE
221/94 upon admit  Hypertensive Emergency present on admission, with ARF  reports not taking any HTN medications. Plan to start scheduled amlodipine, hydralazine ordered PRN. Monitor BG and adjust as needed    10/13- /79 on norvasc. ->  176/83 . Prn hydralazine po is ordered   10/14- /65 received one dose of hydralazine yesterday  10/15- not requiring prns. /76   10/17- BP  167/77. Will schedule hydralazine 25 q 8. F/u PCP. May need future adjustments.

## 2023-10-17 NOTE — PROGRESS NOTES
"[facility] - [facility] ([place] [address])  Nephrology  Progress Note    Patient Name: Jian Arrieta  MRN: [id number]  Admission Date: 10/12/2023  Hospital Length of Stay: 5 days  Attending Provider: Sharla Duran MD   Primary Care Physician: Leon Barajas DO  Principal Problem:ELIEZER (acute kidney injury)    Subjective:     HPI: Jian Arrieta is a 68M w/ MARIE (liver bx 3/2019 w/o cirrhosis), s/p sleeve gastrectomy, CAD s/p CABG, CKD3 (BL creatinine 2.2), and history of obstructive nephrolithiasis who presents from nephrology clinic w/ worsening renal function x 2 weeks. Recent admission to [facility]/ nephrolithiasis of R kidney. Was discharged from  w/ doxycycline out of concern for pyelonephritis. In clinic, creatinine was noted to be significantly elevated to 4.7 prompting ED referral. On arrival, reported improving but persistent flank pain. Denies any fevers/chills/N/V. Denies changes in urine output, dysuria, or hematuria. Of note, blood glucose 516 on arrival.    Nephrology consulted for "acute on chronic kidney failure."      Interval History: POD1 cystoscopy + stent placement with urology yesterday. Adequate UOP (2L/ 24h). Cr continues to improve. Patient w/o complaints at this time.     Review of patient's allergies indicates:  No Known Allergies  Current Facility-Administered Medications   Medication Frequency    0.9%  NaCl infusion Continuous    acetaminophen tablet 650 mg Q4H PRN    amLODIPine tablet 10 mg Daily    dextrose 10% bolus 125 mL 125 mL PRN    dextrose 10% bolus 125 mL 125 mL PRN    dextrose 10% bolus 250 mL 250 mL PRN    dextrose 10% bolus 250 mL 250 mL PRN    glucagon (human recombinant) injection 1 mg PRN    glucose chewable tablet 16 g PRN    glucose chewable tablet 16 g PRN    glucose chewable tablet 24 g PRN    glucose chewable tablet 24 g PRN    heparin (porcine) injection 5,000 Units Q8H    hydrALAZINE tablet 25 mg Q8H    insulin aspart U-100 pen 0-5 Units QID (AC + HS) PRN    " insulin aspart U-100 pen 5 Units TIDWM    insulin detemir U-100 (Levemir) pen 18 Units QHS    melatonin tablet 6 mg Nightly PRN    naloxone 0.4 mg/mL injection 0.02 mg PRN    ondansetron injection 4 mg Q8H PRN    prochlorperazine injection Soln 5 mg Q6H PRN    sodium chloride 0.65 % nasal spray 1 spray PRN    sodium chloride 0.9% flush 10 mL PRN    tamsulosin 24 hr capsule 0.4 mg QHS       Objective:     Vital Signs (Most Recent):  Temp: 97.8 °F (36.6 °C) (10/17/23 0815)  Pulse: 81 (10/17/23 0815)  Resp: 18 (10/17/23 0815)  BP: (!) 167/77 (10/17/23 0815)  SpO2: 96 % (10/17/23 0815) Vital Signs (24h Range):  Temp:  [97.6 °F (36.4 °C)-98.8 °F (37.1 °C)] 97.8 °F (36.6 °C)  Pulse:  [64-91] 81  Resp:  [11-18] 18  SpO2:  [96 %-100 %] 96 %  BP: ()/(55-84) 167/77     Weight: 126.6 kg (279 lb) (10/13/23 1025)  Body mass index is 43.7 kg/m².  Body surface area is 2.45 meters squared.    I/O last 3 completed shifts:  In: 620 [P.O.:120; IV Piggyback:500]  Out: 1975 [Urine:1975]     Physical Exam  Vitals and nursing note reviewed.   Constitutional:       General: He is not in acute distress.     Appearance: He is not ill-appearing, toxic-appearing or diaphoretic.   HENT:      Head: Normocephalic and atraumatic.      Right Ear: External ear normal.      Left Ear: External ear normal.      Nose: Nose normal.      Mouth/Throat:      Mouth: Mucous membranes are moist.      Pharynx: Oropharynx is clear.   Eyes:      General: No scleral icterus.     Extraocular Movements: Extraocular movements intact.      Conjunctiva/sclera: Conjunctivae normal.      Pupils: Pupils are equal, round, and reactive to light.   Cardiovascular:      Rate and Rhythm: Normal rate and regular rhythm.      Pulses: Normal pulses.      Heart sounds: Normal heart sounds.   Pulmonary:      Effort: Pulmonary effort is normal. No respiratory distress.   Abdominal:      General: There is no distension.      Tenderness: There is no abdominal tenderness. There  "is no right CVA tenderness, left CVA tenderness, guarding or rebound.   Musculoskeletal:         General: No swelling or deformity. Normal range of motion.      Cervical back: Normal range of motion.      Right lower leg: Edema present.      Left lower leg: Edema present.   Skin:     General: Skin is warm and dry.      Coloration: Skin is not jaundiced.   Neurological:      General: No focal deficit present.      Mental Status: He is alert and oriented to person, place, and time. Mental status is at baseline.      Motor: No weakness.          Significant Labs:  CBC:   Recent Labs   Lab 10/16/23  1424   WBC 5.82   RBC 4.18*   HGB 12.3*   HCT 39.0*   *   MCV 93   MCH 29.4   MCHC 31.5*     CMP:   Recent Labs   Lab 10/17/23  0745   *   CALCIUM 8.0*   ALBUMIN 2.3*   PROT 5.5*      K 4.8   CO2 19*   *   BUN 24*   CREATININE 3.1*   ALKPHOS 114   ALT 13   AST 24   BILITOT 0.2     All labs within the past 24 hours have been reviewed.     Significant Imaging:  Labs: Reviewed      Assessment/Plan:     Renal/  * ELIEZER (acute kidney injury)  Creatinine 4.7 on admit, baseline around 2.2. Improving, 3.1 today.     Jian Arrieta is a 68M w/ MARIE (liver bx 3/2019 w/o cirrhosis), s/p sleeve gastrectomy, CAD s/p CABG, CKD3 (BL creatinine 2.2), and history of obstructive nephrolithiasis who presents from nephrology clinic w/ worsening renal function x 2 weeks. Recent admission to  w/ nephrolithiasis of R kidney. Was discharged from  w/ doxycycline out of concern for pyelonephritis. In clinic, creatinine was noted to be significantly elevated to 4.7 prompting ED referral. On arrival, reported improving but persistent flank pain. Denies any subjective fevers/chills/N/V. Denies changes in urine output, dysuria, or hematuria. Of note, blood glucose 516 on arrival. Nephrology consulted for "acute on chronic kidney failure."    Lab Results   Component Value Date    CREATININE 3.1 (H) 10/17/2023     Estimated " "Creatinine Clearance: 28.7 mL/min (A) (based on SCr of 3.1 mg/dL (H)).    CT A/P:   "- Mild right hydronephrosis and right hydroureter with gradual tapering without evidence for ureteral calculus, these findings may relate to sequelae of recently passed calculus, clinical and historical correlation is needed.   - Mild perinephric stranding bilaterally, nonspecific however correlation for UTI/pyelonephritis is needed."    TTE with EF 50-55%, normal diastolic function, no evidence of pHTN, CVP 3.    Renal U/S with mild R hydronephrosis w/o evidence of nephrolithiasis.     UA: 3+ protein, 4+ glucose, nitrite (-), RBC 15, WBC 11, rare bacteria  Urine chloride 80  Urine creatinine 36.0  Urine microalbumin >5k  Urine protein 781  UPCR 4.29  Urine Na 81    Urology op note 10/16/23:  Right retrograde pyelogram with questionable filling defect at right UPJ  Right ureteroscopy with radiolucent stone identified at right UPJ  Right pyeloscopy revealed turbid urine and additional radiolucent stone burden in the right kidney  Right ureteral stent placed with good coils on fluoroscopy and direct vision in the bladder    Etiology of Acute component superimposed on CKD3: Suspect etiology is multifactorial including ongoing bilateral pyelonephritis, hypertensive emergency and nephrolithiasis in the setting of chronic kidney disease. Resolved with mIVF, patient with ureteral stent placement per urology. Patient likely has diabetic nephropathy with proteinuria and h/o A1c to 11.5% one year ago, most recent 7.6%. Would consider starting a SGLT2 inhibitor.     Plan:  - Trend RFP daily  - Strict I&Os and daily weights   - considered gentle IVF but c/f possible volume overload   - Avoid nephrotoxic agents such as NSAIDs, gadolinium and IV radiocontrast  - Renally dose meds to current GFR  - Maintain MAP > 65  - f/u renin, aldosterone, aldosterone/renin ratio            Thank you for your consult. I will follow-up with patient. Please " contact us if you have any additional questions.    Mandi Shetty MD  Nephrology  [facility] - Intensive Care ([place] [address])    ATTENDING PHYSICIAN ATTESTATION  I have personally verified the history and examined the patient. I thoroughly reviewed the demographic, clinical, laboratorial and imaging information available in medical records. I agree with the assessment and recommendations provided by the subspecialty resident who was under my supervision.

## 2023-10-17 NOTE — PLAN OF CARE
[facility] - [facility] ([facility])  Discharge Reassessment    Primary Care Provider: Leon Barajas DO    Expected Discharge Date: 10/17/2023    Reassessment (most recent)       Discharge Reassessment - 10/17/23 1019          Discharge Reassessment    Assessment Type Discharge Planning Reassessment (P)      Did the patient's condition or plan change since previous assessment? No (P)      Discharge Plan discussed with: Patient (P)      Communicated CAMILLE with patient/caregiver No (P)      Discharge Plan A Home (P)      Discharge Plan B Home with family (P)      DME Needed Upon Discharge  none (P)      Transition of Care Barriers None (P)      Why the patient remains in the hospital Requires continued medical care (P)         Post-Acute Status    Coverage [facility] (P)      Discharge Delays None known at this time (P)                  CM spoke with pt in room.  DC plan is to go home.  Denies any DME need.      GREGORY GenaoN, BS, RN, CCM

## 2023-10-17 NOTE — ASSESSMENT & PLAN NOTE
-Cr 4.7, baseline ~2.2. Imaging shows Mild right hydronephrosis and right hydroureter with gradual tapering and FENa 8% consistent with post-obstructive etiology. Urology consult for assistance  -IV fluids, avoid nephrotoxins and continue to trend Cr    10/13- Nephrology and urology were consulted May be due to hypertensive emergency in the setting of chronic kidney disease.  Monitoring UO.- only 300 cc last night. Post void residual = 0. Had a loose stool.  Consider cardiorenal syndrome. Will get ECHO. Urology planning on stent placement in am.     10/14- continue IVFs today. ECHO was NL.  Treat BP.   10/15- UO= 1050, on  cc/ h. Cr 3.8- no change. Possible Ureter stent placement in am.   10/17 - cr 3.1 IMPROVING, tolerating fluids and meals. Will dc to home. F/u Urology and [facility] med as planned

## 2023-10-18 ENCOUNTER — PATIENT OUTREACH (OUTPATIENT)
Dept: ADMINISTRATIVE | Facility: CLINIC | Age: 68
End: 2023-10-18
Payer: MEDICARE

## 2023-10-18 ENCOUNTER — PATIENT MESSAGE (OUTPATIENT)
Dept: DIABETES | Facility: CLINIC | Age: 68
End: 2023-10-18
Payer: MEDICARE

## 2023-10-18 DIAGNOSIS — T83.122A URETERAL STENT DISPLACEMENT, INITIAL ENCOUNTER: Primary | ICD-10-CM

## 2023-10-18 LAB — RENIN PLAS-CCNC: 0.7 NG/ML/H

## 2023-10-18 NOTE — PROGRESS NOTES
C3 nurse spoke with Jian Arrieta  for a TCC post hospital discharge follow up call. The patient does not have a scheduled HOSFU appointment with Leon Barajas DO  within 5-7 days post hospital discharge date 10/17/2023. C3 nurse was unable to schedule HOSFU appointment in [facility].  Please contact patient and schedule follow up appointment using HOSFU visit type on or before 10/21/2023.    Message sent to PCP staff  NP referral placed for home visit hosp  f/u

## 2023-10-18 NOTE — TELEPHONE ENCOUNTER
Right flank pain  -     Case Request Operating Room: CYSTOURETEROSCOPY,WITH HOLMIUM LASER LITHOTRIPSY OF URETERAL CALCULUS    Hematuria, microscopic  -     Case Request Operating Room: CYSTOURETEROSCOPY,WITH HOLMIUM LASER LITHOTRIPSY OF URETERAL CALCULUS    Hydronephrosis of right kidney  -     Case Request Operating Room: CYSTOURETEROSCOPY,WITH HOLMIUM LASER LITHOTRIPSY OF URETERAL CALCULUS    BPH with urinary obstruction  -     Case Request Operating Room: CYSTOURETEROSCOPY,WITH HOLMIUM LASER LITHOTRIPSY OF URETERAL CALCULUS    Right epididymitis  -     Case Request Operating Room: CYSTOURETEROSCOPY,WITH HOLMIUM LASER LITHOTRIPSY OF URETERAL CALCULUS    Scrotal pain  -     Case Request Operating Room: CYSTOURETEROSCOPY,WITH HOLMIUM LASER LITHOTRIPSY OF URETERAL CALCULUS

## 2023-10-19 ENCOUNTER — CLINICAL SUPPORT (OUTPATIENT)
Dept: DIABETES | Facility: CLINIC | Age: 68
End: 2023-10-19
Payer: MEDICARE

## 2023-10-19 DIAGNOSIS — E13.9 LADA (LATENT AUTOIMMUNE DIABETES IN ADULTS), MANAGED AS TYPE 1: Primary | ICD-10-CM

## 2023-10-19 PROCEDURE — G0108 DIAB MANAGE TRN  PER INDIV: HCPCS | Mod: S$GLB,,, | Performed by: DIETITIAN, REGISTERED

## 2023-10-19 PROCEDURE — G0108 PR DIAB MANAGE TRN  PER INDIV: ICD-10-PCS | Mod: S$GLB,,, | Performed by: DIETITIAN, REGISTERED

## 2023-10-19 RX ORDER — LANCETS 33 GAUGE
1 EACH MISCELLANEOUS 2 TIMES DAILY
Qty: 70 EACH | Refills: 3 | Status: SHIPPED | OUTPATIENT
Start: 2023-10-19

## 2023-10-19 NOTE — PROGRESS NOTES
Diabetes Care Specialist Progress Note  Author: Lori Rush RD, CDE  Date: 10/19/2023    Program Intake  Reason for Diabetes Program Visit:: Intervention  Type of Intervention:: Individual  Education: Other (Needs assistance with getting Omnipod and Dexcom set back up)    Lab Results   Component Value Date    HGBA1C 7.6 (H) 10/09/2023     Message received last Thursday 10/12 that patient was having issues with his Omnipod. Called patient and he was in the ER. He said he was having issues with getting the pod to connect to his controller. He states he wasted 3 pods - he kept getting a communication error after filling the pod. He said it would communicate and prime, but then he kept getting a message that indicated it wouldn't work. At that time, he was advised to reach out to tech support. He was eventually admitted to the hospital and was discharged 2 days ago. He was scheduled today for assistance with getting his Omnipod/Dexcom set back up. He was able to get his Dexcom and Omnipod both restarted today without any issues. Unfortunately, without having seen what error message he was receiving, I am unsure of what the issue may have been. He was advised that if he continues to have issues with the controller, he needs to call tech support for further assistance. He can also call them for pod replacements. He has Levemir pens from discharge. Advised to keep refrigerated for now. We discussed his back up insulin plan, but will likely need reinforcement. He is out of test strips. He had test strips that were 20 years old. Threw all  supplies out. Rx request sent to provider for test strips and lancets for the One Touch Verio meter. Advised patient he needs a new battery for the One Touch meter. He will call with further questions or concerns.    Assessment Summary and Plan    Based on today's diabetes care assessment, the following areas of need were identified:          3/29/2023    12:01 AM   Social    Support No   Access to [employer]/Tech No   Cognitive/Behavioral Health No   Culture/Moravian No   Communication No   Health Literacy No            3/29/2023    12:01 AM   Clinical   Medication Adherence No            3/29/2023    12:01 AM   Diabetes Self-Management Skills   Diabetes Disease Process/Treatment Options No   Nutrition/Healthy Eating No   Physical Activity/Exercise No   Medication Yes   Home Blood Glucose Monitoring No   Acute Complications No   Chronic Complications No   Psychosocial/Coping No          Today's interventions were provided through individual discussion, instruction, and written materials were provided.      Patient verbalized understanding of instruction and written materials.  Pt was able to return back demonstration of instructions today. Patient understood key points, needs reinforcement and further instruction.     Diabetes Self-Management Care Plan:    Today's Diabetes Self-Management Care Plan was developed with Jian's input. Jian has agreed to work toward the following goal(s) to improve his/her overall diabetes control.      There are no recently modified care plans to display for this patient.      Follow Up Plan     Follow up in about 13 days (around 11/1/2023) for Provider Visit.    Today's care plan and follow up schedule was discussed with patient.  Jian verbalized understanding of the care plan, goals, and agrees to follow up plan.        The patient was encouraged to communicate with his/her health care provider/physician and care team regarding his/her condition(s) and treatment.  I provided the patient with my contact information today and encouraged to contact me via phone or [facility] Patient Portal as needed.     Length of Visit   Total Time: 60 Minutes      36.1

## 2023-10-19 NOTE — PROGRESS NOTES
HPI  This 68 y.o. y/o male with PMH of type 2 DM, HTN, HLD, hx of HFrEF&HFpEF (most recent echo showed normal EF and no diastolic dysfunction in 2023), portal vein thrombosis with mild cirrhosis & chronic pancreatitis c/b ascites s/p venoplasty, CAD s/p PCI x5 on Plavix with subsequent CABG, right proximal ureteral stone s/p right ureteroscopy with laser lithotripsy, cystoscopy, right JJ ureteral stent exchange in 2021, now removed, right hydronephrosis s/p ureteral stent came in for post hospitalization follow up visit.    Renal history  Creatinine   Date Value Ref Range Status   10/17/2023 3.1 (H) 0.5 - 1.4 mg/dL Final   10/16/2023 3.5 (H) 0.5 - 1.4 mg/dL Final   10/16/2023 3.5 (H) 0.5 - 1.4 mg/dL Final   10/15/2023 3.8 (H) 0.5 - 1.4 mg/dL Final   10/14/2023 3.9 (H) 0.5 - 1.4 mg/dL Final   10/14/2023 3.7 (H) 0.5 - 1.4 mg/dL Final   10/13/2023 4.5 (H) 0.5 - 1.4 mg/dL Final   10/12/2023 4.7 (H) 0.5 - 1.4 mg/dL Final   10/12/2023 4.7 (H) 0.5 - 1.4 mg/dL Final   10/09/2023 4.0 (H) 0.5 - 1.4 mg/dL Final   Cr trended up to 2.4 then 4.7->hospitalized, s/p treatment of DKA (insulin + fluid) and right ureteral stent via ureteroscopy (CT Mild right hydronephrosis and right hydroureter with gradual tapering without evidence for ureteral calculus) 10/2023-> Cr trended down to 3.1; epididymitis s/p abx  Cr 1.8-2.2 [id number]  Cr up to 3.5 in 2020, then down to 1.8-2.0 in 03/2020, RUQ pain, s/p Cholecystostomy tube placement   Cr up to 4.9 in 2019, then down to 1.4 02/2020; hospitalization for large amount ascites removal s/p venoplasty   Cr 1.1-1.3 prior to 2019  Prot/Creat Ratio, Urine   Date Value Ref Range Status   10/12/2023 4.29 (H) 0.00 - 0.20 Final   05/22/2020 0.45 (H) 0.00 - 0.20 Final   UPCR 0.45 in 2020, increased to 4.29 iso ELIEZER (likely over-estimated)  Urine protein 2+ started 2019, most recent urine protein 4+ 10/2023  Hep B/C, SPEP, SFLC neg; CAROLYNN, ANCA, C3, C4 neg, HIV, RPR neg. Mild elevated IgG noted.  Hx of  BPH with obstruction from Urology's note; on tamsulosin, NM Renogram With Lasix was ordered.  Has been drinking 50 oz of fluid a day  Not taking NSAIDs    Renal stone  Multiple episodes+ > 20  Right proximal ureteral stone s/p right ureteroscopy with laser lithotripsy, cystoscopy, right JJ ureteral stent exchange in 2021. Stone analysis: 80% Uric acid, 20% Calcium oxalate monohydrate.    HTN  109/69 mmHg  BP at home: not checking  Current medication: amlodipine 10 mg, hydralazine 25 mg TID  Trying to be compliant with low salt diet  Weight stable since hospitalization    Type 2 DM  Lab Results   Component Value Date    HGBA1C 7.6 (H) 10/09/2023   No DM retinopathy (correct from prior chart)  Has been poorly controlled (A1c 11 1 year ago)  With insulin therapy    10/2023 abdominal scan  Nonobstructing calcification at the midpole of the left kidney is again noted.  There is mild perinephric stranding bilaterally, this is mildly more prominent than the prior study and is nonspecific, correlation for renal disease to include UTI/pyelonephritis is needed.  On the left there is no evidence for ureteral calculus or obstructive uropathy.  On the right there is mild right hydronephrosis and mild right hydroureter with tapering of the right ureter, there is no ureteral calculus seen.  These findings may relate to sequelae of recently passed calculus, clinical and historical correlation is needed.  Mild urinary bladder wall thickening may relate to incomplete distention however correlation for UTI/cystitis is needed.    Impression:     Mild right hydronephrosis and right hydroureter with gradual tapering without evidence for ureteral calculus, these findings may relate to sequelae of recently passed calculus, clinical and historical correlation is needed.     Mild perinephric stranding bilaterally, nonspecific however correlation for UTI/pyelonephritis is needed.     Mild urinary bladder wall thickening may relate to  incomplete distention however correlation for UTI/cystitis is needed.    RP US 10/2023  FINDINGS:  Right kidney: The right kidney measures 11.2 cm. No cortical thinning. No loss of corticomedullary distinction. Resistive index measures 0.81.  No mass. No renal stone. There is mild hydronephrosis.     Left kidney: The left kidney measures 11.8 cm. No cortical thinning. No loss of corticomedullary distinction. Resistive index measures 0.83.  No mass. There is nonobstructive nephrolithiasis no hydronephrosis.     The bladder is partially distended at the time of scanning and has an unremarkable appearance.  Left ureteral jet is demonstrated within the urinary bladder.  There is a 1.8 cm right adrenal nodule.  The hepatic parenchyma is heterogeneous and somewhat echogenic, may reflect steatosis, correlation with LFTs advised.  There is a right pleural effusion.  There is mild abdominal ascites.  The prostate is enlarged.  There is a calcific focus within the right hepatic lobe.     Impression:     This report was flagged in Epic as abnormal.     Mild right hydronephrosis, distal ureteral calculus not excluded noting only left ureteral jet is able to be identified.  Developing obstructive uropathy is of concern.  Correlation is advised.     Renal findings suggest chronic medical renal disease.     Right pleural effusion.     Right adrenal nodule.        Past Medical History:   Diagnosis Date    Alcohol abuse     Anasarca 1/28/2019    Anemia     Anticoagulant long-term use     Arthropathy associated with neurological disorder 9/2/2015    Atherosclerosis     Charcot foot due to diabetes mellitus     Chronic combined systolic and diastolic heart failure 01/29/2019 1-28-19 Left VentricleModerate decreased ejection fraction at 30%. Normal left ventricular cavity size. Normal wall thickness observed. Grade I (mild) left ventricular diastolic dysfunction consistent with impaired relaxation. Normal left atrial pressure.  Moderate, global hypokinesis(see wall scoring diagram). Right VentricleNormal cavity size, wall thickness and ejection fraction. Wall motion n    Chronic pancreatitis 1/28/2019    CKD (chronic kidney disease) stage 3, GFR 30-59 ml/min     CKD (chronic kidney disease) stage 4, GFR 15-29 ml/min     Colon polyps     approx 5 yrs ago    Coronary artery disease due to calcified coronary lesion 05/08/2015    5 stents on ASA      Diabetic polyneuropathy associated with type 2 diabetes mellitus 9/2/2015    Diverticulosis 1/28/2019    DM type 2 with diabetic peripheral neuropathy 2/4/2019    Encounter for blood transfusion     Essential hypertension 1/28/2019    Former smoker 8/26/2015    Healed ulcer of left foot on examination 6/20/2017    Hematuria     Hydrocele     approx 1.5 yrs ago    Hyperphosphatemia     Hypoalbuminemia 2/4/2019    Hypocalcemia     Lymphedema of both lower extremities 1/29/2019    Mixed hyperlipidemia 5/8/2015    Morbid obesity with BMI of 50.0-59.9, adult 5/8/2015    Obstruction of right ureteropelvic junction (UPJ) due to stone 5/24/2021    Onychomycosis of multiple toenails with type 2 diabetes mellitus and peripheral neuropathy 6/20/2017    Perianal cyst     approx 2 yrs ago    Proteinuria     Pseudocyst of pancreas 1/28/2019 1-28-19 Liver has a cirrhotic morphology with no focal lesions.  Significant interval increase in ascites when compared to prior exam which may account for patient's abdominal distension.  Hypodense air-fluid collection along the body of the pancreas which is slightly smaller when compared to prior CT.  Findings may relate to pancreatic necrosis with pancreatic pseudocysts with infected pseudocyst    Skin cancer     skin cancer    Sleep apnea 8/26/2015    Status post bariatric surgery 1/11/2016    Type 2 diabetes mellitus, with long-term current use of insulin 5/8/2015    Urinary tract infection        Past Surgical History:   Procedure Laterality Date    ANGIOGRAPHY N/A  6/28/2019    Procedure: ANGIOGRAM-PV STENT;  Surgeon: Ewa Diagnostic Provider;  Location: [facility] OR;  Service: Radiology;  Laterality: N/A;    ANGIOPLASTY      total x5 stents    COLONOSCOPY N/A 10/6/2015    Procedure: COLONOSCOPY;  Surgeon: Shekhar Richards MD;  Location: [facility] (2ND FLR);  Service: Endoscopy;  Laterality: N/A;  BMI over 55/2nd floor case    5 day hold Plavix, Dr Kwadwo Arroyo    COLONOSCOPY N/A 5/13/2021    Procedure: COLONOSCOPY;  Surgeon: Huan Brumfield MD;  Location: [facility] ENDO;  Service: Endoscopy;  Laterality: N/A;    CORONARY ANGIOGRAPHY Right 3/20/2019    Procedure: ANGIOGRAM, CORONARY ARTERY;  Surgeon: Bob Duque MD;  Location: [facility] CATH LAB;  Service: Cardiology;  Laterality: Right;    CORONARY ARTERY BYPASS GRAFT  2017    x3    CORONARY BYPASS GRAFT ANGIOGRAPHY  3/20/2019    Procedure: Bypass graft study;  Surgeon: Bob Duque MD;  Location: [facility] CATH LAB;  Service: Cardiology;;    CYST REMOVAL      CYSTOSCOPY Right 6/30/2021    Procedure: CYSTOSCOPY;  Surgeon: William Diaz MD;  Location: [facility] OR 1ST FLR;  Service: Urology;  Laterality: Right;    CYSTOSCOPY N/A 10/16/2023    Procedure: CYSTOSCOPY;  Surgeon: Chrystal Dias MD;  Location: [facility] OR 1ST FLR;  Service: Urology;  Laterality: N/A;    CYSTOSCOPY W/ URETERAL STENT PLACEMENT Right 6/16/2021    Procedure: CYSTOSCOPY, WITH URETERAL STENT INSERTION;  Surgeon: William Diaz MD;  Location: [facility] OR 2ND FLR;  Service: Urology;  Laterality: Right;  FLUORO LESS THAN 1 HOUR    ENDOSCOPIC ULTRASOUND OF UPPER GASTROINTESTINAL TRACT N/A 2/26/2020    Procedure: ULTRASOUND, UPPER GI TRACT, ENDOSCOPIC;  Surgeon: Robbie Yang MD;  Location: [facility];  Service: Endoscopy;  Laterality: N/A;    ESOPHAGOGASTRODUODENOSCOPY N/A 7/8/2019    Procedure: ESOPHAGOGASTRODUODENOSCOPY (EGD);  Surgeon: Huan Brumfield MD;  Location: [facility] ENDO;  Service: Endoscopy;  Laterality: N/A;    ESOPHAGOGASTRODUODENOSCOPY N/A 5/13/2021     Procedure: EGD (ESOPHAGOGASTRODUODENOSCOPY);  Surgeon: Huan Brumfield MD;  Location: [facility] ENDO;  Service: Endoscopy;  Laterality: N/A;    EXCISION OF HYDROCELE Bilateral 6/16/2021    Procedure: HYDROCELE REPAIR;  Surgeon: William Diaz MD;  Location: [facility] OR 2ND FLR;  Service: Urology;  Laterality: Bilateral;  2 HOURS    FLUOROSCOPY N/A 6/16/2021    Procedure: FLUOROSCOPY;  Surgeon: William Diaz MD;  Location: [facility] OR 2ND FLR;  Service: Urology;  Laterality: N/A;    GASTRECTOMY      INSERTION OF DIALYSIS CATHETER N/A 5/17/2019    Procedure: pleurx;  Surgeon: [facility] Diagnostic Provider;  Location: [facility] OR 2ND FLR;  Service: General;  Laterality: N/A;  Room 188[facility]    KNEE ARTHROSCOPY      LAPAROSCOPIC CHOLECYSTECTOMY N/A 5/4/2020    Procedure: CHOLECYSTECTOMY, LAPAROSCOPIC;  Surgeon: SON Rowe MD;  Location: [facility] OR;  Service: General;  Laterality: N/A;    LASER LITHOTRIPSY N/A 6/30/2021    Procedure: LITHOTRIPSY, USING LASER;  Surgeon: William Diaz MD;  Location: [facility] OR 1ST FLR;  Service: Urology;  Laterality: N/A;    LIVER BIOPSY N/A 5/4/2020    Procedure: BIOPSY, LIVER, Laproscopic ;  Surgeon: SON Rowe MD;  Location: [facility] OR;  Service: General;  Laterality: N/A;    perianal surgery      perianal cyst removed    PYELOSCOPY Right 6/30/2021    Procedure: PYELOSCOPY;  Surgeon: William Diaz MD;  Location: [facility] OR 1ST FLR;  Service: Urology;  Laterality: Right;    PYELOSCOPY Right 10/16/2023    Procedure: PYELOSCOPY;  Surgeon: Chrystal Dias MD;  Location: [facility] OR 1ST FLR;  Service: Urology;  Laterality: Right;    REPLACEMENT OF STENT Right 6/30/2021    Procedure: REPLACEMENT, STENT;  Surgeon: William Diaz MD;  Location: [facility] OR 1ST FLR;  Service: Urology;  Laterality: Right;    RETROGRADE PYELOGRAPHY Right 10/16/2023    Procedure: PYELOGRAM, RETROGRADE;  Surgeon: Chrystal Dias MD;  Location: [facility] OR [address] [place] [address];  Service: Urology;  Laterality: Right;    URETERAL STENT  PLACEMENT Right 10/16/2023    Procedure: INSERTION, STENT, URETER;  Surgeon: Chrystal Dias MD;  [facility];  Service: Urology;  Laterality: Right;    URETEROSCOPY Right 2021    Procedure: URETEROSCOPY FLEXIBLE URETEROSCOPE;  Surgeon: William Diaz MD;  [facility];  Service: Urology;  Laterality: Right;    URETEROSCOPY Right 10/16/2023    Procedure: URETEROSCOPY;  Surgeon: Chrystal Dias MD;  [facility];  Service: Urology;  Laterality: Right;       Review of patient's allergies indicates:  No Known Allergies      Social History     Socioeconomic History    Marital status:    Tobacco Use    Smoking status: Former     Current packs/day: 0.00     Average packs/day: 2.0 packs/day for 30.0 years (60.0 ttl pk-yrs)     Types: Cigarettes     Start date: 1975     Quit date: 2005     Years since quittin.7    Smokeless tobacco: Never   Substance and Sexual Activity    Alcohol use: No     Comment: started ~, reports 1 shot daily, max 3 shots daily, vague about alcohol consumption. Last drink 2018    Drug use: No     Social Determinants of Health     Financial Resource Strain: Low Risk  (10/13/2023)    Overall Financial Resource Strain (CARDIA)     Difficulty of Paying Living Expenses: Not very hard   Physical Activity: Unknown (10/13/2023)    Exercise Vital Sign     Days of Exercise per Week: 7 days     Minutes of Exercise per Session: Patient refused   Stress: Unknown (10/13/2023)    [employer] - Occupational Stress Questionnaire     Feeling of Stress : Patient refused   Social Connections: Moderately Isolated (10/13/2023)    Social Connection and Isolation Panel [NHANES]     Frequency of Communication with Friends and Family: More than three times a week     Frequency of Social Gatherings with Friends and Family: More than three times a week     Attends Pentecostal Services: Never     Active Member of  "Clubs or Organizations: No     Attends Club or Organization Meetings: Never     Marital Status:        Family History   Problem Relation Age of Onset    Cancer Mother     Cancer Father     Heart disease Father     Obesity Sister     Parkinsonism Brother     No Known Problems Paternal Grandmother     Cancer Paternal Grandfather     Cancer Brother     Diabetes Maternal Grandmother     Stroke Maternal Grandfather     Cirrhosis Neg Hx        ROS negative except listed above    PHYSICAL EXAMINATION:  Blood pressure 109/69, pulse 84, height 5' 7" (1.702 m), weight 129.3 kg (285 lb 0.9 oz), SpO2 98 %.  Constitutional - No acute distress  HEENT - Grossly normal  Neck - supple.   Cardiovascular - JVP mild distended 8-9 cm  Respiratory - Clear  Musculoskeletal - Peripheral edema 2+ (chronic edema for years)  Dermatologic/Skin - Skin warm and dry.  No rashes.    Neurologic - No acute neurological deficit  Psychiatric - AAOx3    Assessment and Plan  1.  Acute kidney injury, resolving:       CKD Stage 4:        Proteinuria    Urine Protein Creatinine Ratios:    Prot/Creat Ratio, Urine   Date Value Ref Range Status   10/12/2023 4.29 (H) 0.00 - 0.20 Final   05/22/2020 0.45 (H) 0.00 - 0.20 Final   02/27/2020 0.73 (H) 0.00 - 0.20 Final         Acid-Base:   Lab Results   Component Value Date     10/17/2023    K 4.8 10/17/2023    CO2 19 (L) 10/17/2023   -Etiology of ELIEZER: DKA, obstructive uropathy (right hydro s/p stent)  -Etiology of CKD: multiple ELIEZER in the past, HTN, DM, hx of CHF  -Hypoalbuminemia is likely chronic due to underlying pancreatitis/PVT/cirrhosis rather than proteinuria. Hep B/C, SPEP, SFLC neg; CAROLYNN, ANCA, C3, C4 neg, HIV, RPR neg. Mild elevated IgG noted.  -Will repeat BMP, UACR, UPCR  -Counseled to avoid NSAIDs  -Adequate fluid intake 50 oz a day  -Will consider ACEI/ARB once Cr stable. Can further add SGLT2 if eGFR still > 20 after maximizing ACEI/ARB.  -Continues to follow with Urology for right " hydroureter/hydronephrosis s/p stent    2. HTN: BP goal 130/80 mmHg  -Counseled for low salt diet  -Counseled the patient to check BP at home  -Amlodipine 10 mg, hydralazine 25 mg TID  -Will consider switching hydralazine to ACEI/ARB    3. Bone mineral condition:   Lab Results   Component Value Date    CALCIUM 8.0 (L) 10/17/2023    CALCIUM 7.8 (L) 10/16/2023    CAION 1.13 03/15/2019    PHOS 4.0 09/28/2020    PHOS 4.6 (H) 05/22/2020     Vit D, 25-Hydroxy   Date Value Ref Range Status   08/02/2021 23 (L) 30 - 96 ng/mL Final     Comment:     Vitamin D deficiency.........<10 ng/mL                              Vitamin D insufficiency......10-29 ng/mL       Vitamin D sufficiency........> or equal to 30 ng/mL  Vitamin D toxicity............>100 ng/mL      Will continue to monitor     4. Anemia:   Lab Results   Component Value Date    HGB 12.3 (L) 10/16/2023    FERRITIN 270 03/15/2019    IRON 25 (L) 03/15/2019    TRANSFERRIN 37 (L) 03/15/2019    TIBC 55 (L) 03/15/2019    FESATURATED 45 03/15/2019    Will repeat iron profile    5. Type 2 DM:  Last HbA1C   Lab Results   Component Value Date    HGBA1C 7.6 (H) 10/09/2023     Counseled the patient regarding the importance of sugar control to slow CKD progression     Follow up in 2 months

## 2023-10-23 ENCOUNTER — OFFICE VISIT (OUTPATIENT)
Dept: UROLOGY | Facility: CLINIC | Age: 68
End: 2023-10-23
Payer: MEDICARE

## 2023-10-23 ENCOUNTER — TELEPHONE (OUTPATIENT)
Dept: UROLOGY | Facility: CLINIC | Age: 68
End: 2023-10-23
Payer: MEDICARE

## 2023-10-23 ENCOUNTER — HOSPITAL ENCOUNTER (OUTPATIENT)
Dept: RADIOLOGY | Facility: HOSPITAL | Age: 68
Discharge: HOME | End: 2023-10-23
Attending: UROLOGY
Payer: MEDICARE

## 2023-10-23 VITALS
SYSTOLIC BLOOD PRESSURE: 115 MMHG | HEART RATE: 86 BPM | BODY MASS INDEX: 44.49 KG/M2 | DIASTOLIC BLOOD PRESSURE: 69 MMHG | WEIGHT: 283.5 LBS | HEIGHT: 67 IN

## 2023-10-23 DIAGNOSIS — Z96.0 URETERAL STENT PRESENT: Primary | ICD-10-CM

## 2023-10-23 DIAGNOSIS — Q62.11 HYDRONEPHROSIS WITH URETEROPELVIC JUNCTION (UPJ) OBSTRUCTION: ICD-10-CM

## 2023-10-23 DIAGNOSIS — N20.1 URETERAL STONE: ICD-10-CM

## 2023-10-23 DIAGNOSIS — Z96.0 URETERAL STENT PRESENT: ICD-10-CM

## 2023-10-23 PROCEDURE — 3074F PR MOST RECENT SYSTOLIC BLOOD PRESSURE < 130 MM HG: ICD-10-PCS | Mod: CPTII,S$GLB,, | Performed by: UROLOGY

## 2023-10-23 PROCEDURE — 3288F PR FALLS RISK ASSESSMENT DOCUMENTED: ICD-10-PCS | Mod: CPTII,S$GLB,, | Performed by: UROLOGY

## 2023-10-23 PROCEDURE — 3061F PR NEG MICROALBUMINURIA RESULT DOCUMENTED/REVIEW: ICD-10-PCS | Mod: CPTII,S$GLB,, | Performed by: UROLOGY

## 2023-10-23 PROCEDURE — 3078F PR MOST RECENT DIASTOLIC BLOOD PRESSURE < 80 MM HG: ICD-10-PCS | Mod: CPTII,S$GLB,, | Performed by: UROLOGY

## 2023-10-23 PROCEDURE — 1159F MED LIST DOCD IN RCRD: CPT | Mod: CPTII,S$GLB,, | Performed by: UROLOGY

## 2023-10-23 PROCEDURE — 1126F PR PAIN SEVERITY QUANTIFIED, NO PAIN PRESENT: ICD-10-PCS | Mod: CPTII,S$GLB,, | Performed by: UROLOGY

## 2023-10-23 PROCEDURE — 3078F DIAST BP <80 MM HG: CPT | Mod: CPTII,S$GLB,, | Performed by: UROLOGY

## 2023-10-23 PROCEDURE — 99215 OFFICE O/P EST HI 40 MIN: CPT | Mod: 57,S$GLB,, | Performed by: UROLOGY

## 2023-10-23 PROCEDURE — 1111F PR DISCHARGE MEDS RECONCILED W/ CURRENT OUTPATIENT MED LIST: ICD-10-PCS | Mod: CPTII,S$GLB,, | Performed by: UROLOGY

## 2023-10-23 PROCEDURE — 3066F PR DOCUMENTATION OF TREATMENT FOR NEPHROPATHY: ICD-10-PCS | Mod: CPTII,S$GLB,, | Performed by: UROLOGY

## 2023-10-23 PROCEDURE — 1101F PR PT FALLS ASSESS DOC 0-1 FALLS W/OUT INJ PAST YR: ICD-10-PCS | Mod: CPTII,S$GLB,, | Performed by: UROLOGY

## 2023-10-23 PROCEDURE — 1111F DSCHRG MED/CURRENT MED MERGE: CPT | Mod: CPTII,S$GLB,, | Performed by: UROLOGY

## 2023-10-23 PROCEDURE — 3051F PR MOST RECENT HEMOGLOBIN A1C LEVEL 7.0 - < 8.0%: ICD-10-PCS | Mod: CPTII,S$GLB,, | Performed by: UROLOGY

## 2023-10-23 PROCEDURE — 99999 PR PBB SHADOW E&M-EST. PATIENT-LVL V: ICD-10-PCS | Mod: PBBFAC,,, | Performed by: UROLOGY

## 2023-10-23 PROCEDURE — 1101F PT FALLS ASSESS-DOCD LE1/YR: CPT | Mod: CPTII,S$GLB,, | Performed by: UROLOGY

## 2023-10-23 PROCEDURE — 1159F PR MEDICATION LIST DOCUMENTED IN MEDICAL RECORD: ICD-10-PCS | Mod: CPTII,S$GLB,, | Performed by: UROLOGY

## 2023-10-23 PROCEDURE — 74018 RADEX ABDOMEN 1 VIEW: CPT | Mod: 26,,, | Performed by: RADIOLOGY

## 2023-10-23 PROCEDURE — 3288F FALL RISK ASSESSMENT DOCD: CPT | Mod: CPTII,S$GLB,, | Performed by: UROLOGY

## 2023-10-23 PROCEDURE — 3066F NEPHROPATHY DOC TX: CPT | Mod: CPTII,S$GLB,, | Performed by: UROLOGY

## 2023-10-23 PROCEDURE — 1126F AMNT PAIN NOTED NONE PRSNT: CPT | Mod: CPTII,S$GLB,, | Performed by: UROLOGY

## 2023-10-23 PROCEDURE — 74018 RADEX ABDOMEN 1 VIEW: CPT | Mod: TC

## 2023-10-23 PROCEDURE — 3061F NEG MICROALBUMINURIA REV: CPT | Mod: CPTII,S$GLB,, | Performed by: UROLOGY

## 2023-10-23 PROCEDURE — 99999 PR PBB SHADOW E&M-EST. PATIENT-LVL V: CPT | Mod: PBBFAC,,, | Performed by: UROLOGY

## 2023-10-23 PROCEDURE — 3008F BODY MASS INDEX DOCD: CPT | Mod: CPTII,S$GLB,, | Performed by: UROLOGY

## 2023-10-23 PROCEDURE — 74018 XR ABDOMEN AP 1 VIEW: ICD-10-PCS | Mod: 26,,, | Performed by: RADIOLOGY

## 2023-10-23 PROCEDURE — 3051F HG A1C>EQUAL 7.0%<8.0%: CPT | Mod: CPTII,S$GLB,, | Performed by: UROLOGY

## 2023-10-23 PROCEDURE — 3074F SYST BP LT 130 MM HG: CPT | Mod: CPTII,S$GLB,, | Performed by: UROLOGY

## 2023-10-23 PROCEDURE — 99215 PR OFFICE/OUTPT VISIT, EST, LEVL V, 40-54 MIN: ICD-10-PCS | Mod: 57,S$GLB,, | Performed by: UROLOGY

## 2023-10-23 PROCEDURE — 3008F PR BODY MASS INDEX (BMI) DOCUMENTED: ICD-10-PCS | Mod: CPTII,S$GLB,, | Performed by: UROLOGY

## 2023-10-23 NOTE — PROGRESS NOTES
"    CHIEF COMPLAINT:    Mr. Arrieta is a 68 y.o. male presenting to discuss his upcoming surgery.  Hospitalization discharge follow up.  PRESENTING ILLNESS:    Jian Arrieta is a 68 y.o. male who presents for evaluation of right flank pain.    PMHx MARIE (liver bx 3/2019 w/o cirrhosis), hx of sleeve gastrectomy, CAD s/p CABG, and CKD3 w/ hx of obstructive nephrolithiasis who presents to the ED from nephrology clinic for worsening renal function for last 2 weeks. Pt. Baseline Cr ~2.2, but he recently presented to [facility] ED with B/L flank pain. Cr wasmildly elevated at 2.56 and CT renal stone revealed "mild fullness of the proximal R renal collecting system without evidence of obstructing stone and surrounding perinephric stranding." Pt. Was discharged from the ED with PO doxycycline to treat possible pyelonephritis although U/A was LE negative and not sent for culture. he obtained nephrology f/u yesterday, and on repeat labs Cr noted to be 4.7 and K+ 5.2., so he was referred to the ED. He reports some persistent flank pain, but it has improved. He denies any fevers, chills, nausea, or vomiting. No reported changes in urine output, dysuria, or hematuria. On ED arrival, pt. Noted to have  and pt. Reports that his omnipod Dash - Insulin pump must be malfunctioning, although he is not sure when it stopped working. Labs 3 days ago show normal BG.  10/13- admitted for uncontrollled diabetes, acute renal failure, obstructive nephropathy. Recently treated for pyelo w doxycycline. /79   Pulse 97  . Cr 4.9-> 4.5 BL 2.1.  -> 340. Received NS. UO- 300 cc.  One BM this am.  Nurse reports leg hot and red, photos and put in file - exam consistent w stasis dermatitis. AF. Started empiric clindamycin, this am, and discontinued.  continue NS x one liter.  Urology to place stent in am.   Cxr - mild pulm edema  CT abd - Mild right hydronephrosis and right hydroureter with gradual tapering without evidence for ureteral " calculus, these findings may relate to sequelae of recently passed calculus, clinical and historical correlation is needed.  Mild perinephric stranding bilaterally, nonspecific however correlation for UTI/pyelonephritis is needed.  Mild urinary bladder wall thickening may relate to incomplete distention however correlation for UTI/cystitis is needed.  Mild circumferential thickening of the distal descending colon/proximal sigmoid colon with mild pericolonic haziness, correlation for mild colitis to include mild diverticulitis is needed.  Right adrenal nodule likely representing adrenal adenoma.  10/14- appreciate Urology f/u - no need for stent because renal function improving. Renal US showing mild right hydronephrosis. Cr 4.5-> 3.7  baseline around 2.2. Continuing IVFs.  follow up with Urology- Dr. Diaz outpatient for MAG3. Resume diabetic diet. Endocrine following.  BS 92 this am.     10/15- Cr 3.8 from 3.9 from 4.5, baseline around 2.2.  NPO at midnight for possible stent tomorrow. Appreciate Urology f/u.   10/16-  lab cr 3.5.  NPO.   10/17- stent placed successfully yesterday. Eating. Cr 3.1 continues to improve. Will dc to home today. F/u Urology and int med.      Because of right hydronephrosis and ELIEZER. Pt underwent urgent urologic surgery on 10/16/23.  Procedure: 10/16/23  1.  Cystoscopy with right retrograde pyelogram  2.  Fluoroscopy < 1 hour  3.  Intra-operative interpretation of radiographic images  4.  Right ureteroscopy  5.  Right pyeloscopy   6.  Right ureteral stent placement  Operative Findings:   Right retrograde pyelogram with questionable filling defect at right UPJ  Right ureteroscopy with radiolucent stone identified at right UPJ  Right pyeloscopy revealed turbid urine and additional radiolucent stone burden in the right kidney  Right ureteral stent placed with good coils on fluoroscopy and direct vision in the bladder    Stone Analysis  80% Uric acid   20% Calcium oxalate monohydrate     He had  bilateral hydrocelectomy on 6/16/21.  He saw Dr. Diaz on 6/21/21 and was noted to have a post op hematoma.  He has not started urocit k due to decreased kidney function.  Tried viagra in the past and it worked well for him.    PATIENT HISTORY:    Past Medical History:   Diagnosis Date    Alcohol abuse     Anasarca 1/28/2019    Anemia     Anticoagulant long-term use     Arthropathy associated with neurological disorder 9/2/2015    Atherosclerosis     Charcot foot due to diabetes mellitus     Chronic combined systolic and diastolic heart failure 01/29/2019 1-28-19 Left VentricleModerate decreased ejection fraction at 30%. Normal left ventricular cavity size. Normal wall thickness observed. Grade I (mild) left ventricular diastolic dysfunction consistent with impaired relaxation. Normal left atrial pressure. Moderate, global hypokinesis(see wall scoring diagram). Right VentricleNormal cavity size, wall thickness and ejection fraction. Wall motion n    Chronic pancreatitis 1/28/2019    CKD (chronic kidney disease) stage 3, GFR 30-59 ml/min     CKD (chronic kidney disease) stage 4, GFR 15-29 ml/min     Colon polyps     approx 5 yrs ago    Coronary artery disease due to calcified coronary lesion 05/08/2015    5 stents on ASA      Diabetic polyneuropathy associated with type 2 diabetes mellitus 9/2/2015    Diverticulosis 1/28/2019    DM type 2 with diabetic peripheral neuropathy 2/4/2019    Encounter for blood transfusion     Essential hypertension 1/28/2019    Former smoker 8/26/2015    Healed ulcer of left foot on examination 6/20/2017    Hematuria     Hydrocele     approx 1.5 yrs ago    Hyperphosphatemia     Hypoalbuminemia 2/4/2019    Hypocalcemia     Lymphedema of both lower extremities 1/29/2019    Mixed hyperlipidemia 5/8/2015    Morbid obesity with BMI of 50.0-59.9, adult 5/8/2015    Obstruction of right ureteropelvic junction (UPJ) due to stone 5/24/2021    Onychomycosis of multiple toenails with type 2 diabetes  mellitus and peripheral neuropathy 6/20/2017    Perianal cyst     approx 2 yrs ago    Proteinuria     Pseudocyst of pancreas 1/28/2019 1-28-19 Liver has a cirrhotic morphology with no focal lesions.  Significant interval increase in ascites when compared to prior exam which may account for patient's abdominal distension.  Hypodense air-fluid collection along the body of the pancreas which is slightly smaller when compared to prior CT.  Findings may relate to pancreatic necrosis with pancreatic pseudocysts with infected pseudocyst    Skin cancer     skin cancer    Sleep apnea 8/26/2015    Status post bariatric surgery 1/11/2016    Type 2 diabetes mellitus, with long-term current use of insulin 5/8/2015    Urinary tract infection        Past Surgical History:   Procedure Laterality Date    ANGIOGRAPHY N/A 6/28/2019    Procedure: ANGIOGRAM-PV STENT;  Surgeon: Ewa Diagnostic Provider;  Location: [facility] OR;  Service: Radiology;  Laterality: N/A;    ANGIOPLASTY      total x5 stents    COLONOSCOPY N/A 10/6/2015    Procedure: COLONOSCOPY;  Surgeon: Shekhar Richards MD;  Location: [facility] ENDO (2ND FLR);  Service: Endoscopy;  Laterality: N/A;  BMI over 55/2nd floor case    5 day hold Plavix, Dr Kwadwo Arroyo    COLONOSCOPY N/A 5/13/2021    Procedure: COLONOSCOPY;  Surgeon: Huan Brumfield MD;  Location: [facility] ENDO;  Service: Endoscopy;  Laterality: N/A;    CORONARY ANGIOGRAPHY Right 3/20/2019    Procedure: ANGIOGRAM, CORONARY ARTERY;  Surgeon: Bob Duque MD;  Location: [facility] CATH LAB;  Service: Cardiology;  Laterality: Right;    CORONARY ARTERY BYPASS GRAFT  2017    x3    CORONARY BYPASS GRAFT ANGIOGRAPHY  3/20/2019    Procedure: Bypass graft study;  Surgeon: Bob Duque MD;  Location: [facility] CATH LAB;  Service: Cardiology;;    CYST REMOVAL      CYSTOSCOPY Right 6/30/2021    Procedure: CYSTOSCOPY;  Surgeon: William Diaz MD;  Location: [facility] OR 1ST FLR;  Service: Urology;  Laterality: Right;    CYSTOSCOPY N/A  10/16/2023    Procedure: CYSTOSCOPY;  Surgeon: Chrystal Dias MD;  Location: [facility] OR 1ST FLR;  Service: Urology;  Laterality: N/A;    CYSTOSCOPY W/ URETERAL STENT PLACEMENT Right 6/16/2021    Procedure: CYSTOSCOPY, WITH URETERAL STENT INSERTION;  Surgeon: William Diaz MD;  Location: [facility] OR 2ND FLR;  Service: Urology;  Laterality: Right;  FLUORO LESS THAN 1 HOUR    ENDOSCOPIC ULTRASOUND OF UPPER GASTROINTESTINAL TRACT N/A 2/26/2020    Procedure: ULTRASOUND, UPPER GI TRACT, ENDOSCOPIC;  Surgeon: Robbie Yang MD;  Location: [facility];  Service: Endoscopy;  Laterality: N/A;    ESOPHAGOGASTRODUODENOSCOPY N/A 7/8/2019    Procedure: ESOPHAGOGASTRODUODENOSCOPY (EGD);  Surgeon: Huan Brumfield MD;  Location: [facility] ENDO;  Service: Endoscopy;  Laterality: N/A;    ESOPHAGOGASTRODUODENOSCOPY N/A 5/13/2021    Procedure: EGD (ESOPHAGOGASTRODUODENOSCOPY);  Surgeon: Huan Brumfield MD;  Location: [facility];  Service: Endoscopy;  Laterality: N/A;    EXCISION OF HYDROCELE Bilateral 6/16/2021    Procedure: HYDROCELE REPAIR;  Surgeon: William Diaz MD;  Location: [facility] OR 2ND FLR;  Service: Urology;  Laterality: Bilateral;  2 HOURS    FLUOROSCOPY N/A 6/16/2021    Procedure: FLUOROSCOPY;  Surgeon: William Diaz MD;  Location: [facility] OR 2ND FLR;  Service: Urology;  Laterality: N/A;    GASTRECTOMY      INSERTION OF DIALYSIS CATHETER N/A 5/17/2019    Procedure: pleurx;  Surgeon: [facility] Diagnostic Provider;  Location: [facility] OR 2ND FLR;  Service: General;  Laterality: N/A;  Room 188/[facility]    KNEE ARTHROSCOPY      LAPAROSCOPIC CHOLECYSTECTOMY N/A 5/4/2020    Procedure: CHOLECYSTECTOMY, LAPAROSCOPIC;  Surgeon: SON Rowe MD;  Location: [facility] OR;  Service: General;  Laterality: N/A;    LASER LITHOTRIPSY N/A 6/30/2021    Procedure: LITHOTRIPSY, USING LASER;  Surgeon: William Diaz MD;  Location: [facility] OR 1ST FLR;  Service: Urology;  Laterality: N/A;    LIVER BIOPSY N/A 5/4/2020    Procedure: BIOPSY, LIVER, Laproscopic ;   Surgeon: SON Roew MD;  Location: [facility];  Service: General;  Laterality: N/A;    perianal surgery      perianal cyst removed    PYELOSCOPY Right 6/30/2021    Procedure: PYELOSCOPY;  Surgeon: William Diaz MD;  Location: [facility] OR 1ST FLR;  Service: Urology;  Laterality: Right;    PYELOSCOPY Right 10/16/2023    Procedure: PYELOSCOPY;  Surgeon: Chrystal Dias MD;  Location: [facility] OR [facility] FLR;  Service: Urology;  Laterality: Right;    REPLACEMENT OF STENT Right 6/30/2021    Procedure: REPLACEMENT, STENT;  Surgeon: William Diaz MD;  Location: [facility] OR [facility] FLR;  Service: Urology;  Laterality: Right;    RETROGRADE PYELOGRAPHY Right 10/16/2023    Procedure: PYELOGRAM, RETROGRADE;  Surgeon: Chrystal Dias MD;  [facility];  Service: Urology;  Laterality: Right;    URETERAL STENT PLACEMENT Right 10/16/2023    Procedure: INSERTION, STENT, URETER;  Surgeon: Chrystal Dias MD;  [facility];  Service: Urology;  Laterality: Right;    URETEROSCOPY Right 6/30/2021    Procedure: URETEROSCOPY FLEXIBLE URETEROSCOPE;  Surgeon: William Diaz MD;  [facility];  Service: Urology;  Laterality: Right;    URETEROSCOPY Right 10/16/2023    Procedure: URETEROSCOPY;  Surgeon: Chrystal Dias MD;  Location: [facility] OR [address] [place] [address];  Service: Urology;  Laterality: Right;       Family History   Problem Relation Age of Onset    Cancer Mother     Cancer Father     Heart disease Father     Obesity Sister     Parkinsonism Brother     No Known Problems Paternal Grandmother     Cancer Paternal Grandfather     Cancer Brother     Diabetes Maternal Grandmother     Stroke Maternal Grandfather     Cirrhosis Neg Hx        Social History     Socioeconomic History    Marital status:    Tobacco Use    Smoking status: Former     Current packs/day: 0.00     Average packs/day: 2.0 packs/day for 30.0 years (60.0 ttl pk-yrs)     Types: Cigarettes     Start date: 2/1/1975     Quit  date: 2005     Years since quittin.7    Smokeless tobacco: Never   Substance and Sexual Activity    Alcohol use: No     Comment: started ~, reports 1 shot daily, max 3 shots daily, vague about alcohol consumption. Last drink 2018    Drug use: No     Social Determinants of Health     Financial Resource Strain: Low Risk  (10/13/2023)    Overall Financial Resource Strain (CARDIA)     Difficulty of Paying Living Expenses: Not very hard   Physical Activity: Unknown (10/13/2023)    Exercise Vital Sign     Days of Exercise per Week: 7 days     Minutes of Exercise per Session: Patient refused   Stress: Unknown (10/13/2023)    [employer] - Occupational Stress Questionnaire     Feeling of Stress : Patient refused   Social Connections: Moderately Isolated (10/13/2023)    Social Connection and Isolation Panel [NHANES]     Frequency of Communication with Friends and Family: More than three times a week     Frequency of Social Gatherings with Friends and Family: More than three times a week     Attends [employer] Services: Never     Active Member of Clubs or Organizations: No     Attends Club or Organization Meetings: Never     Marital Status:        Allergies:  Patient has no known allergies.    Medications:    Current Outpatient Medications:     acetaminophen (TYLENOL) 325 MG tablet, Take 2 tablets (650 mg total) by mouth every 8 (eight) hours as needed for Pain., Disp: 30 tablet, Rfl: 0    amLODIPine (NORVASC) 10 MG tablet, Take 1 tablet (10 mg total) by mouth once daily., Disp: 30 tablet, Rfl: 11    blood sugar diagnostic (ONETOUCH VERIO TEST STRIPS) Strp, 1 each by Misc.(Non-Drug; Combo Route) route 2 (two) times a day., Disp: 70 each, Rfl: 3    blood-glucose sensor (DEXCOM G6 SENSOR) Sandi, Inject 1 each into the skin every 10 days., Disp: 3 each, Rfl: 11    blood-glucose transmitter (DEXCOM G6 TRANSMITTER) Sandi, Inject 1 each into the skin every 10 days., Disp: 1 each, Rfl:  "3    ciprofloxacin HCl (CIPRO) 500 MG tablet, Take 1 tablet (500 mg total) by mouth every 12 (twelve) hours. for 14 days, Disp: 28 tablet, Rfl: 0    glucagon (BAQSIMI) 3 mg/actuation Spry, 1 each by Nasal route daily as needed (if glucose is less than 70 - can repeat in 1 hour if still low/less than 70)., Disp: 2 each, Rfl: 3    hydrALAZINE (APRESOLINE) 25 MG tablet, Take 1 tablet (25 mg total) by mouth every 8 (eight) hours., Disp: 90 tablet, Rfl: 11    insulin aspart U-100 (NOVOLOG) 100 unit/mL (3 mL) InPn pen, Inject 0-5 Units into the skin before meals and at bedtime as needed (Hyperglycemia)., Disp: 5 mL, Rfl: 0    insulin aspart U-100 (NOVOLOG) 100 unit/mL (3 mL) InPn pen, Inject 5 Units into the skin 3 (three) times daily., Disp: 4.5 mL, Rfl: 11    insulin detemir U-100, Levemir, 100 unit/mL (3 mL) SubQ InPn pen, Inject 18 Units into the skin every evening., Disp: 5.4 mL, Rfl: 11    insulin pump cart,automated,BT (OMNIPOD 5 G6 PODS, GEN 5,) Crtg, Inject 1 each into the skin every other day. Use with omnipod 5 pdm as directed., Disp: 15 each, Rfl: 11    lancets (ONETOUCH DELICA PLUS LANCET) 33 gauge Misc, 1 lancet  by Misc.(Non-Drug; Combo Route) route 2 (two) times daily., Disp: 70 each, Rfl: 3    pen needle, diabetic 32 gauge x 5/32" Ndle, Use with toujeo insulin one daily only as Emergency use/back up insulin - if OFF OF your insulin pump., Disp: 30 each, Rfl: 3    tamsulosin (FLOMAX) 0.4 mg Cap, Take 1 capsule (0.4 mg total) by mouth every evening., Disp: 90 capsule, Rfl: 3    aspirin (ECOTRIN) 81 MG EC tablet, Take 1 tablet (81 mg total) by mouth once daily., Disp: 9 tablet, Rfl: 0    PHYSICAL EXAMINATION:    Constitutional: He appears well-developed and well-nourished.  He is in no apparent distress.    Eyes: No scleral icterus noted bilaterally. No discharge bilaterally.    Nose: No rhinorrhea    Cardiovascular: Normal rate.      Pulmonary/Chest: Effort normal. No respiratory distress.     Abdominal:  " He exhibits no distension.      Neurological: He is alert and oriented to person, place, and time.     Psych: Cooperative with normal affect.    No CVA tenderness noted.  Normal testicular exam on both sides.  Slightly tender on the right epid? On palpation.  Np swelling or erythema noted in the scrotum.      Lab Results   Component Value Date    PSA 1.2 04/06/2023    PSA 1.2 03/16/2022      US 10/13/23  Mild right hydronephrosis, distal ureteral calculus not excluded noting only left ureteral jet is able to be identified.  Developing obstructive uropathy is of concern.  Correlation is advised.     Renal findings suggest chronic medical renal disease.     Right pleural effusion.     Right adrenal nodule.    CT 10/12/23  Mild right hydronephrosis and right hydroureter with gradual tapering without evidence for ureteral calculus, these findings may relate to sequelae of recently passed calculus, clinical and historical correlation is needed.     Mild perinephric stranding bilaterally, nonspecific however correlation for UTI/pyelonephritis is needed.     Mild urinary bladder wall thickening may relate to incomplete distention however correlation for UTI/cystitis is needed.     Mild circumferential thickening of the distal descending colon/proximal sigmoid colon with mild pericolonic haziness, correlation for mild colitis to include mild diverticulitis is needed.     Right adrenal nodule likely representing adrenal adenoma.    CT RSS 10/3/23 at   There is no evidence for calyceal stone in the right kidney. There is linear vascular calcification the anterior upper pole of the right kidney. There are new hyperdense foci in the mid left kidney, which may represent small hyperdense cysts. There is very mild fullness of the proximal right renal collecting system, but without evidence for a ureteral calculus. The remainder of the right ureter is decompressed. The urinary bladder is decompressed. There is asymmetric soft  tissue stranding of the right perinephric fat. The possibility of right pyelonephritis should be considered in the differential.      CT RSS 6/27/22  Nonobstructing 10 mm left renal stone.  No obstructive uropathy.   Fecalization of distal small bowel loops, suggesting slow transit.  Enlarged prostate.   Mildly nodular hepatic contour.  Correlate for chronic liver disease.      Urine pH 6, +++ protein, 500 Glucose, 50 + blood today    KUB today 10/9/23  Surgical clips right upper quadrant and right paracentral pelvis, 7 mm diagonal vascular calcification overlies left renal nicole, stable.  Nonobstructive tiny stone right mid renal pole and 9.5 mm size stone left mid renal parenchyma cannot be discriminated.  Kidneys obscured by overlying bowel structures, and no stones identified region of ureters and urinary bladder.       IMPRESSION:  Ureteral stent present  -     X-Ray Abdomen AP 1 View; Future; Expected date: 10/23/2023  -     X-Ray Abdomen AP 1 View; Future; Expected date: 10/23/2023    Hydronephrosis with ureteropelvic junction (UPJ) obstruction  -     Ambulatory referral/consult to Urology      S/p right ureteroscopy with stone removal, right ureteral stent placement on 10/16/23.    KUB today.  Urine culture.    Will cance his scheduled surgery on 10/25/23.  Will reschedule him for cysto right ureteroscopy, pyeloscopy, right ureteral stent replacement on 12/6/23 instead.    Continue flomax.  Continue to get a better control of his diabetes and HTN.    I spent 40 minutes with the patient of which more than half was spent in direct consultation with the patient in regards to our treatment and plan.       PLAN:  Follow up in about 6 weeks (around 12/6/2023), or cysto right ureteroscopy, pyeloscopy, right ureteral stent replacement.

## 2023-10-23 NOTE — TELEPHONE ENCOUNTER
Ureteral stent present  -     Case Request Operating Room: CYSTOURETEROSCOPY, WITH HOLMIUM LASER LITHOTRIPSY OF URETERAL CALCULUS AND STENT INSERTION    Ureteral stone  -     Case Request Operating Room: CYSTOURETEROSCOPY, WITH HOLMIUM LASER LITHOTRIPSY OF URETERAL CALCULUS AND STENT INSERTION

## 2023-10-23 NOTE — PATIENT INSTRUCTIONS
Jojo ([place]) Berny, my surgery scheduler will schedule your surgery ([phone]).  The risks and benefits of the procedure were discussed with the patient in detail.    The patient was told to stop all blood thinners prior to surgery.  Stop ASA and ASA products ( all NSADIS) 1 wk before  Antibiotic soap shower a night before surgery

## 2023-10-23 NOTE — PHYSICIAN QUERY
PT Name: Jian Arrieta  MR #: [id number]     DOCUMENTATION CLARIFICATION     CDS/: Ifrah Rebollar RN              Contact information: [email or web address]  This form is a permanent document in the medical record.     Query Date: October 23, 2023    By submitting this query, we are merely seeking further clarification of documentation.  Please utilize your independent clinical judgment when addressing the question(s) below.    The Medical Record contains the following   Indicators Supporting Clinical Findings Location in Medical Record   x Heart Failure documented Chronic combined systolic and diastolic heart failure    Acute on chronic combined systolic and diastolic heart failure   10/13 Hosp Med MD PN      10/17 DC summary       x BNP BNP= 299 10/12 Lab     x EF/Echo   ECHO 10/13:  Left Ventricle: The left ventricle is normal in size. Normal wall thickness. Septal motion is consistent with post-operative status. There is low normal systolic function with a visually estimated ejection fraction of 50 - 55%. There is normal diastolic function.  Right Ventricle: Normal right ventricular cavity size. Wall thickness is normal. Right ventricle wall motion  is normal. Systolic function is normal.  Pulmonary Artery: The estimated pulmonary artery systolic pressure is 21 mmHg.  IVC/SVC: Normal venous pressure at 3 mmHg.   10/13 HP   x Radiology findings Pulmonary findings suggest edema   10/12 CXR    Subjective/Objective Respiratory Conditions      Recent/Current MI      Heart Transplant, LVAD     x Edema, JVD CXR mild pulm edema    General edema present    10/13 Hosp Med MD PN    10/12 ED MD MEYER    Ascites      Diuretics/Meds       x Other Treatment 10/13- dc IVF until echo returns and Nephrology see pt.     Cxr  Echo  Cardiac monitor 10/13 Hosp Med MD PN      10/13 Orders    Other       Heart failure is a clinical diagnosis which includes symptomatic fluid retention, elevated intracardiac pressures, and/or  the inability of the heart to deliver adequate blood flow.    Heart Failure with reduced Ejection Fraction (HFrEF) or Systolic Heart Failure (loses ability to contract normally, EF is <40%)    Heart Failure with preserved Ejection Fraction (HFpEF) or Diastolic Heart Failure (stiff ventricles, does not relax properly, EF is >50%)     Heart Failure with Combined Systolic and Diastolic Failure (stiff ventricles, does not relax properly and EF is <50%)    Mid-range or mildly reduced ejection fraction (HFmrEF) is classified as systolic heart failure.  Congestive heart failure with a recovered EF is classified as Diastolic Heart Failure.  Common clues to acute exacerbation:  Rapidly progressive symptoms (w/in 2 weeks of presentation), using IV diuretics, using supplemental O2, pulmonary edema on Xray, new or worsening pleural effusion, +JVD or other signs of volume overload, MI w/in 4 weeks, and/or BNP >500  The clinical guidelines noted are only system guidelines, and do not replace the providers clinical judgment.    Provider, please specify the diagnosis associated with the above clinical findings.    [   ]  Acute on Chronic Combined Systolic and Diastolic Heart Failure   Please specify criteria and treatment for the diagnosis:  ____________________________  Specify POA status:  (   ) Yes   (  x ) No   (   ) clinically undetermined  Retrospectively, he did not have ACUTE. It was chronic venous insuff and mild overload due to ARenalF   [   ]  Chronic Combined Systolic and Diastolic Heart Failure      [  x ]  Other (please specify): ___chronic disatolic HF__He had history of systolic HF. ____EF had recovered since last ECHO.__________________________     [  ]  Clinically Undetermined           Please document in your progress notes daily for the duration of treatment until resolved and include in your discharge summary.    References:  American Heart Association editorial staff. (2017, May). Ejection Fraction Heart  Failure Measurement. American Heart Association. https://www.heart.org/en/health-topics/heart-failure/diagnosing-heart-failure/ejection-fraction-heart-failure-measurement#:~:text=Ejection%20fraction%20(EF)%20is%20a,pushed%20out%20with%20each%20heartbeat  MORGAN Henriquez (2020, December 15). Heart failure with preserved ejection fraction: Clinical manifestations and diagnosis. [employer]. https://www.[employer]/contents/heart-failure-with-preserved-ejection-fraction-clinical-manifestations-and-diagnosis.  ICD-10-CM/PCS Coding Clinic Third Quarter ICD-10, Effective with discharges: September 8, 2020 [facility] § Heart failure with mid-range or mildly reduced ejection fraction (2020).  ICD-10-CM/PCS Coding Clinic Third Quarter ICD-10, Effective with discharges: September 8, 2020 [facility] § Heart failure with recovered ejection fraction (2020).  Form No. 29052

## 2023-10-25 ENCOUNTER — OFFICE VISIT (OUTPATIENT)
Dept: NEPHROLOGY | Facility: CLINIC | Age: 68
End: 2023-10-25
Payer: MEDICARE

## 2023-10-25 VITALS
HEIGHT: 67 IN | BODY MASS INDEX: 44.74 KG/M2 | WEIGHT: 285.06 LBS | HEART RATE: 84 BPM | OXYGEN SATURATION: 98 % | SYSTOLIC BLOOD PRESSURE: 109 MMHG | DIASTOLIC BLOOD PRESSURE: 69 MMHG

## 2023-10-25 DIAGNOSIS — N17.9 ACUTE KIDNEY INJURY: ICD-10-CM

## 2023-10-25 DIAGNOSIS — D63.1 ANEMIA IN CHRONIC KIDNEY DISEASE, UNSPECIFIED CKD STAGE: ICD-10-CM

## 2023-10-25 DIAGNOSIS — R80.9 PROTEINURIA, UNSPECIFIED TYPE: ICD-10-CM

## 2023-10-25 DIAGNOSIS — N18.4 CHRONIC KIDNEY DISEASE, STAGE 4 (SEVERE): Primary | ICD-10-CM

## 2023-10-25 DIAGNOSIS — N18.9 ANEMIA IN CHRONIC KIDNEY DISEASE, UNSPECIFIED CKD STAGE: ICD-10-CM

## 2023-10-25 DIAGNOSIS — I10 HYPERTENSION, UNSPECIFIED TYPE: ICD-10-CM

## 2023-10-25 DIAGNOSIS — N13.4 HYDROURETER, RIGHT: ICD-10-CM

## 2023-10-25 DIAGNOSIS — N18.4 TYPE 2 DIABETES MELLITUS WITH STAGE 4 CHRONIC KIDNEY DISEASE, UNSPECIFIED WHETHER LONG TERM INSULIN USE: ICD-10-CM

## 2023-10-25 DIAGNOSIS — E11.22 TYPE 2 DIABETES MELLITUS WITH STAGE 4 CHRONIC KIDNEY DISEASE, UNSPECIFIED WHETHER LONG TERM INSULIN USE: ICD-10-CM

## 2023-10-25 PROCEDURE — 1111F PR DISCHARGE MEDS RECONCILED W/ CURRENT OUTPATIENT MED LIST: ICD-10-PCS | Mod: CPTII,S$GLB,, | Performed by: INTERNAL MEDICINE

## 2023-10-25 PROCEDURE — 3074F PR MOST RECENT SYSTOLIC BLOOD PRESSURE < 130 MM HG: ICD-10-PCS | Mod: CPTII,S$GLB,, | Performed by: INTERNAL MEDICINE

## 2023-10-25 PROCEDURE — 99999 PR PBB SHADOW E&M-EST. PATIENT-LVL III: ICD-10-PCS | Mod: PBBFAC,,, | Performed by: INTERNAL MEDICINE

## 2023-10-25 PROCEDURE — 1159F PR MEDICATION LIST DOCUMENTED IN MEDICAL RECORD: ICD-10-PCS | Mod: CPTII,S$GLB,, | Performed by: INTERNAL MEDICINE

## 2023-10-25 PROCEDURE — 3066F PR DOCUMENTATION OF TREATMENT FOR NEPHROPATHY: ICD-10-PCS | Mod: CPTII,S$GLB,, | Performed by: INTERNAL MEDICINE

## 2023-10-25 PROCEDURE — 99214 OFFICE O/P EST MOD 30 MIN: CPT | Mod: S$GLB,,, | Performed by: INTERNAL MEDICINE

## 2023-10-25 PROCEDURE — 3061F PR NEG MICROALBUMINURIA RESULT DOCUMENTED/REVIEW: ICD-10-PCS | Mod: CPTII,S$GLB,, | Performed by: INTERNAL MEDICINE

## 2023-10-25 PROCEDURE — 3078F PR MOST RECENT DIASTOLIC BLOOD PRESSURE < 80 MM HG: ICD-10-PCS | Mod: CPTII,S$GLB,, | Performed by: INTERNAL MEDICINE

## 2023-10-25 PROCEDURE — 99214 PR OFFICE/OUTPT VISIT, EST, LEVL IV, 30-39 MIN: ICD-10-PCS | Mod: S$GLB,,, | Performed by: INTERNAL MEDICINE

## 2023-10-25 PROCEDURE — 1160F RVW MEDS BY RX/DR IN RCRD: CPT | Mod: CPTII,S$GLB,, | Performed by: INTERNAL MEDICINE

## 2023-10-25 PROCEDURE — 1111F DSCHRG MED/CURRENT MED MERGE: CPT | Mod: CPTII,S$GLB,, | Performed by: INTERNAL MEDICINE

## 2023-10-25 PROCEDURE — 3061F NEG MICROALBUMINURIA REV: CPT | Mod: CPTII,S$GLB,, | Performed by: INTERNAL MEDICINE

## 2023-10-25 PROCEDURE — 1126F PR PAIN SEVERITY QUANTIFIED, NO PAIN PRESENT: ICD-10-PCS | Mod: CPTII,S$GLB,, | Performed by: INTERNAL MEDICINE

## 2023-10-25 PROCEDURE — 1160F PR REVIEW ALL MEDS BY PRESCRIBER/CLIN PHARMACIST DOCUMENTED: ICD-10-PCS | Mod: CPTII,S$GLB,, | Performed by: INTERNAL MEDICINE

## 2023-10-25 PROCEDURE — 3078F DIAST BP <80 MM HG: CPT | Mod: CPTII,S$GLB,, | Performed by: INTERNAL MEDICINE

## 2023-10-25 PROCEDURE — 1159F MED LIST DOCD IN RCRD: CPT | Mod: CPTII,S$GLB,, | Performed by: INTERNAL MEDICINE

## 2023-10-25 PROCEDURE — 3051F PR MOST RECENT HEMOGLOBIN A1C LEVEL 7.0 - < 8.0%: ICD-10-PCS | Mod: CPTII,S$GLB,, | Performed by: INTERNAL MEDICINE

## 2023-10-25 PROCEDURE — 3051F HG A1C>EQUAL 7.0%<8.0%: CPT | Mod: CPTII,S$GLB,, | Performed by: INTERNAL MEDICINE

## 2023-10-25 PROCEDURE — 3074F SYST BP LT 130 MM HG: CPT | Mod: CPTII,S$GLB,, | Performed by: INTERNAL MEDICINE

## 2023-10-25 PROCEDURE — 1126F AMNT PAIN NOTED NONE PRSNT: CPT | Mod: CPTII,S$GLB,, | Performed by: INTERNAL MEDICINE

## 2023-10-25 PROCEDURE — 3008F PR BODY MASS INDEX (BMI) DOCUMENTED: ICD-10-PCS | Mod: CPTII,S$GLB,, | Performed by: INTERNAL MEDICINE

## 2023-10-25 PROCEDURE — 99999 PR PBB SHADOW E&M-EST. PATIENT-LVL III: CPT | Mod: PBBFAC,,, | Performed by: INTERNAL MEDICINE

## 2023-10-25 PROCEDURE — 3066F NEPHROPATHY DOC TX: CPT | Mod: CPTII,S$GLB,, | Performed by: INTERNAL MEDICINE

## 2023-10-25 PROCEDURE — 3008F BODY MASS INDEX DOCD: CPT | Mod: CPTII,S$GLB,, | Performed by: INTERNAL MEDICINE

## 2023-10-25 RX ORDER — ACETAMINOPHEN 500 MG
1 TABLET ORAL 2 TIMES DAILY
Qty: 1 EACH | Refills: 0 | Status: SHIPPED | OUTPATIENT
Start: 2023-10-25

## 2023-10-27 ENCOUNTER — OFFICE VISIT (OUTPATIENT)
Dept: INTERNAL MEDICINE | Facility: CLINIC | Age: 68
End: 2023-10-27
Payer: MEDICARE

## 2023-10-27 ENCOUNTER — TELEPHONE (OUTPATIENT)
Dept: INTERNAL MEDICINE | Facility: CLINIC | Age: 68
End: 2023-10-27
Payer: MEDICARE

## 2023-10-27 VITALS
OXYGEN SATURATION: 99 % | BODY MASS INDEX: 45.01 KG/M2 | HEART RATE: 80 BPM | WEIGHT: 286.81 LBS | HEIGHT: 67 IN | SYSTOLIC BLOOD PRESSURE: 126 MMHG | TEMPERATURE: 97 F | RESPIRATION RATE: 20 BRPM | DIASTOLIC BLOOD PRESSURE: 64 MMHG

## 2023-10-27 DIAGNOSIS — Z79.4 TYPE 2 DIABETES MELLITUS WITH DIABETIC NEUROPATHY, WITH LONG-TERM CURRENT USE OF INSULIN: Chronic | ICD-10-CM

## 2023-10-27 DIAGNOSIS — E11.22 TYPE 2 DIABETES MELLITUS WITH STAGE 3 CHRONIC KIDNEY DISEASE, WITH LONG-TERM CURRENT USE OF INSULIN, UNSPECIFIED WHETHER STAGE 3A OR 3B CKD: ICD-10-CM

## 2023-10-27 DIAGNOSIS — I70.0 ATHEROSCLEROSIS OF AORTA: ICD-10-CM

## 2023-10-27 DIAGNOSIS — I70.0 AORTIC ATHEROSCLEROSIS: ICD-10-CM

## 2023-10-27 DIAGNOSIS — I87.2 VENOUS INSUFFICIENCY OF BOTH LOWER EXTREMITIES: ICD-10-CM

## 2023-10-27 DIAGNOSIS — K86.1 OTHER CHRONIC PANCREATITIS: ICD-10-CM

## 2023-10-27 DIAGNOSIS — I25.84 CORONARY ARTERY DISEASE DUE TO CALCIFIED CORONARY LESION: ICD-10-CM

## 2023-10-27 DIAGNOSIS — I81 PORTAL HYPERTENSION DUE TO OBSTRUCTION OF EXTRAHEPATIC PORTAL VEIN: Chronic | ICD-10-CM

## 2023-10-27 DIAGNOSIS — Z79.4 TYPE 2 DIABETES MELLITUS WITH STAGE 3 CHRONIC KIDNEY DISEASE, WITH LONG-TERM CURRENT USE OF INSULIN, UNSPECIFIED WHETHER STAGE 3A OR 3B CKD: ICD-10-CM

## 2023-10-27 DIAGNOSIS — K74.00 HEPATIC FIBROSIS: ICD-10-CM

## 2023-10-27 DIAGNOSIS — N13.30 HYDRONEPHROSIS, UNSPECIFIED HYDRONEPHROSIS TYPE: ICD-10-CM

## 2023-10-27 DIAGNOSIS — N13.9 OBSTRUCTIVE UROPATHY: ICD-10-CM

## 2023-10-27 DIAGNOSIS — E11.40 TYPE 2 DIABETES MELLITUS WITH DIABETIC NEUROPATHY, WITH LONG-TERM CURRENT USE OF INSULIN: Chronic | ICD-10-CM

## 2023-10-27 DIAGNOSIS — I25.84 CORONARY ARTERY DISEASE DUE TO CALCIFIED CORONARY LESION: Chronic | ICD-10-CM

## 2023-10-27 DIAGNOSIS — G47.30 SLEEP APNEA, UNSPECIFIED TYPE: ICD-10-CM

## 2023-10-27 DIAGNOSIS — E11.42 DIABETIC POLYNEUROPATHY ASSOCIATED WITH TYPE 2 DIABETES MELLITUS: ICD-10-CM

## 2023-10-27 DIAGNOSIS — N18.30 TYPE 2 DIABETES MELLITUS WITH STAGE 3 CHRONIC KIDNEY DISEASE, WITH LONG-TERM CURRENT USE OF INSULIN, UNSPECIFIED WHETHER STAGE 3A OR 3B CKD: ICD-10-CM

## 2023-10-27 DIAGNOSIS — Z95.1 HX OF CABG: ICD-10-CM

## 2023-10-27 DIAGNOSIS — N17.9 ACUTE KIDNEY INJURY SUPERIMPOSED ON CKD: Primary | ICD-10-CM

## 2023-10-27 DIAGNOSIS — I48.0 PAROXYSMAL ATRIAL FIBRILLATION: ICD-10-CM

## 2023-10-27 DIAGNOSIS — N18.9 ACUTE KIDNEY INJURY SUPERIMPOSED ON CKD: Primary | ICD-10-CM

## 2023-10-27 DIAGNOSIS — Z91.199 MEDICALLY NONCOMPLIANT: ICD-10-CM

## 2023-10-27 DIAGNOSIS — E13.9 LADA (LATENT AUTOIMMUNE DIABETES IN ADULTS), MANAGED AS TYPE 1: ICD-10-CM

## 2023-10-27 DIAGNOSIS — E11.42 DIABETIC POLYNEUROPATHY ASSOCIATED WITH TYPE 2 DIABETES MELLITUS: Primary | ICD-10-CM

## 2023-10-27 DIAGNOSIS — E11.21 DIABETIC NEPHROPATHY ASSOCIATED WITH TYPE 2 DIABETES MELLITUS: ICD-10-CM

## 2023-10-27 DIAGNOSIS — I25.10 CORONARY ARTERY DISEASE DUE TO CALCIFIED CORONARY LESION: ICD-10-CM

## 2023-10-27 DIAGNOSIS — Z12.5 ENCOUNTER FOR SCREENING FOR MALIGNANT NEOPLASM OF PROSTATE: ICD-10-CM

## 2023-10-27 DIAGNOSIS — K74.69 OTHER CIRRHOSIS OF LIVER: ICD-10-CM

## 2023-10-27 DIAGNOSIS — I89.0 LYMPHEDEMA OF BOTH LOWER EXTREMITIES: Chronic | ICD-10-CM

## 2023-10-27 DIAGNOSIS — I25.10 CORONARY ARTERY DISEASE DUE TO CALCIFIED CORONARY LESION: Chronic | ICD-10-CM

## 2023-10-27 DIAGNOSIS — K76.6 PORTAL HYPERTENSION DUE TO OBSTRUCTION OF EXTRAHEPATIC PORTAL VEIN: Chronic | ICD-10-CM

## 2023-10-27 DIAGNOSIS — N20.1 URETERAL STONE: ICD-10-CM

## 2023-10-27 PROCEDURE — G0008 ADMIN INFLUENZA VIRUS VAC: HCPCS | Mod: S$GLB,,, | Performed by: INTERNAL MEDICINE

## 2023-10-27 PROCEDURE — 1160F RVW MEDS BY RX/DR IN RCRD: CPT | Mod: CPTII,S$GLB,, | Performed by: INTERNAL MEDICINE

## 2023-10-27 PROCEDURE — 99999 PR PBB SHADOW E&M-EST. PATIENT-LVL IV: CPT | Mod: PBBFAC,,, | Performed by: INTERNAL MEDICINE

## 2023-10-27 PROCEDURE — 3288F PR FALLS RISK ASSESSMENT DOCUMENTED: ICD-10-PCS | Mod: CPTII,S$GLB,, | Performed by: INTERNAL MEDICINE

## 2023-10-27 PROCEDURE — 3288F FALL RISK ASSESSMENT DOCD: CPT | Mod: CPTII,S$GLB,, | Performed by: INTERNAL MEDICINE

## 2023-10-27 PROCEDURE — 1159F PR MEDICATION LIST DOCUMENTED IN MEDICAL RECORD: ICD-10-PCS | Mod: CPTII,S$GLB,, | Performed by: INTERNAL MEDICINE

## 2023-10-27 PROCEDURE — 3066F NEPHROPATHY DOC TX: CPT | Mod: CPTII,S$GLB,, | Performed by: INTERNAL MEDICINE

## 2023-10-27 PROCEDURE — 1160F PR REVIEW ALL MEDS BY PRESCRIBER/CLIN PHARMACIST DOCUMENTED: ICD-10-PCS | Mod: CPTII,S$GLB,, | Performed by: INTERNAL MEDICINE

## 2023-10-27 PROCEDURE — 1111F PR DISCHARGE MEDS RECONCILED W/ CURRENT OUTPATIENT MED LIST: ICD-10-PCS | Mod: CPTII,S$GLB,, | Performed by: INTERNAL MEDICINE

## 2023-10-27 PROCEDURE — 3074F SYST BP LT 130 MM HG: CPT | Mod: CPTII,S$GLB,, | Performed by: INTERNAL MEDICINE

## 2023-10-27 PROCEDURE — 1126F PR PAIN SEVERITY QUANTIFIED, NO PAIN PRESENT: ICD-10-PCS | Mod: CPTII,S$GLB,, | Performed by: INTERNAL MEDICINE

## 2023-10-27 PROCEDURE — 99215 OFFICE O/P EST HI 40 MIN: CPT | Mod: S$GLB,,, | Performed by: INTERNAL MEDICINE

## 2023-10-27 PROCEDURE — 99215 PR OFFICE/OUTPT VISIT, EST, LEVL V, 40-54 MIN: ICD-10-PCS | Mod: S$GLB,,, | Performed by: INTERNAL MEDICINE

## 2023-10-27 PROCEDURE — G0008 FLU VACCINE - QUADRIVALENT - ADJUVANTED: ICD-10-PCS | Mod: S$GLB,,, | Performed by: INTERNAL MEDICINE

## 2023-10-27 PROCEDURE — 90694 VACC AIIV4 NO PRSRV 0.5ML IM: CPT | Mod: S$GLB,,, | Performed by: INTERNAL MEDICINE

## 2023-10-27 PROCEDURE — 1126F AMNT PAIN NOTED NONE PRSNT: CPT | Mod: CPTII,S$GLB,, | Performed by: INTERNAL MEDICINE

## 2023-10-27 PROCEDURE — 3078F DIAST BP <80 MM HG: CPT | Mod: CPTII,S$GLB,, | Performed by: INTERNAL MEDICINE

## 2023-10-27 PROCEDURE — 3008F PR BODY MASS INDEX (BMI) DOCUMENTED: ICD-10-PCS | Mod: CPTII,S$GLB,, | Performed by: INTERNAL MEDICINE

## 2023-10-27 PROCEDURE — 1101F PT FALLS ASSESS-DOCD LE1/YR: CPT | Mod: CPTII,S$GLB,, | Performed by: INTERNAL MEDICINE

## 2023-10-27 PROCEDURE — 3078F PR MOST RECENT DIASTOLIC BLOOD PRESSURE < 80 MM HG: ICD-10-PCS | Mod: CPTII,S$GLB,, | Performed by: INTERNAL MEDICINE

## 2023-10-27 PROCEDURE — 1111F DSCHRG MED/CURRENT MED MERGE: CPT | Mod: CPTII,S$GLB,, | Performed by: INTERNAL MEDICINE

## 2023-10-27 PROCEDURE — 99999 PR PBB SHADOW E&M-EST. PATIENT-LVL IV: ICD-10-PCS | Mod: PBBFAC,,, | Performed by: INTERNAL MEDICINE

## 2023-10-27 PROCEDURE — 90694 FLU VACCINE - QUADRIVALENT - ADJUVANTED: ICD-10-PCS | Mod: S$GLB,,, | Performed by: INTERNAL MEDICINE

## 2023-10-27 PROCEDURE — 3008F BODY MASS INDEX DOCD: CPT | Mod: CPTII,S$GLB,, | Performed by: INTERNAL MEDICINE

## 2023-10-27 PROCEDURE — 3066F PR DOCUMENTATION OF TREATMENT FOR NEPHROPATHY: ICD-10-PCS | Mod: CPTII,S$GLB,, | Performed by: INTERNAL MEDICINE

## 2023-10-27 PROCEDURE — 3051F HG A1C>EQUAL 7.0%<8.0%: CPT | Mod: CPTII,S$GLB,, | Performed by: INTERNAL MEDICINE

## 2023-10-27 PROCEDURE — 3061F PR NEG MICROALBUMINURIA RESULT DOCUMENTED/REVIEW: ICD-10-PCS | Mod: CPTII,S$GLB,, | Performed by: INTERNAL MEDICINE

## 2023-10-27 PROCEDURE — 3051F PR MOST RECENT HEMOGLOBIN A1C LEVEL 7.0 - < 8.0%: ICD-10-PCS | Mod: CPTII,S$GLB,, | Performed by: INTERNAL MEDICINE

## 2023-10-27 PROCEDURE — 1101F PR PT FALLS ASSESS DOC 0-1 FALLS W/OUT INJ PAST YR: ICD-10-PCS | Mod: CPTII,S$GLB,, | Performed by: INTERNAL MEDICINE

## 2023-10-27 PROCEDURE — 3074F PR MOST RECENT SYSTOLIC BLOOD PRESSURE < 130 MM HG: ICD-10-PCS | Mod: CPTII,S$GLB,, | Performed by: INTERNAL MEDICINE

## 2023-10-27 PROCEDURE — 3061F NEG MICROALBUMINURIA REV: CPT | Mod: CPTII,S$GLB,, | Performed by: INTERNAL MEDICINE

## 2023-10-27 PROCEDURE — 1159F MED LIST DOCD IN RCRD: CPT | Mod: CPTII,S$GLB,, | Performed by: INTERNAL MEDICINE

## 2023-10-27 NOTE — PROGRESS NOTES
"Subjective     Patient ID: Jian Arrieta is a 68 y.o. male.    Chief Complaint: Follow-up    HPI  Pt with T2DM with neuropathy, CAD/PAF, HTN, HLD, CKD III, Atherosclerosis, Anemia, Morbid Obesity(hx of sleeve gastrectomy), Chronic LE lymphedema, BERHANE, Ascites/portal HTN, Chronic pancreatitis, Hx of nephrolithiasis is here for hospital f/u from 10/12/23-10/17/25 for ELIEZER/CKD a/w flank pain. Per records, "Pt presented to the ED from nephrology clinic for worsening renal function for last 2 weeks. Pt. Baseline Cr ~2.2, but he recently presented to [facility] ED with B/L flank pain. Cr wasmildly elevated at 2.56 and CT renal stone revealed "mild fullness of the proximal R renal collecting system without evidence of obstructing stone and surrounding perinephric stranding." Pt. Was discharged from the ED with PO doxycycline to treat possible pyelonephritis although U/A was LE negative and not sent for culture. he obtained nephrology f/u yesterday, and on repeat labs Cr noted to be 4.7 and K+ 5.2., so he was referred to the ED. He reports some persistent flank pain, but it has improved. He denies any fevers, chills, nausea, or vomiting. No reported changes in urine output, dysuria, or hematuria. On ED arrival, pt. Noted to have  and pt. Reports that his omnipod Dash - Insulin pump must be malfunctioning, although he is not sure when it stopped working. Labs 3 days ago show normal BG.    Hospital Course:   10/13- admitted for uncontrollled diabetes, acute renal failure, obstructive nephropathy. Recently treated for pyelo w doxycycline. /79   Pulse 97  . Cr 4.9-> 4.5 BL 2.1.  -> 340. Received NS. UO- 300 cc.  One BM this am.  Nurse reports leg hot and red, photos and put in file - exam consistent w stasis dermatitis. AF. Started empiric clindamycin, this am, and discontinued.  continue NS x one liter.  Urology to place stent in am.   Cxr - mild pulm edema  CT abd - Mild right hydronephrosis and right hydroureter " "with gradual tapering without evidence for ureteral calculus, these findings may relate to sequelae of recently passed calculus, clinical and historical correlation is needed.  Mild perinephric stranding bilaterally, nonspecific however correlation for UTI/pyelonephritis is needed.  Mild urinary bladder wall thickening may relate to incomplete distention however correlation for UTI/cystitis is needed.  Mild circumferential thickening of the distal descending colon/proximal sigmoid colon with mild pericolonic haziness, correlation for mild colitis to include mild diverticulitis is needed.  Right adrenal nodule likely representing adrenal adenoma.  10/14- appreciate Urology f/u - no need for stent because renal function improving. Renal US showing mild right hydronephrosis. Cr 4.5-> 3.7  baseline around 2.2. Continuing IVFs.  follow up with Urology- Dr. Diaz outpatient for MAG3. Resume diabetic diet. Endocrine following.  BS 92 this am.     10/15- Cr 3.8 from 3.9 from 4.5, baseline around 2.2.  NPO at midnight for possible stent tomorrow. Appreciate Urology f/u.   10/16-  lab cr 3.5.  NPO.   10/17- stent placed successfully yesterday. Eating. Cr 3.1 continues to improve. Will dc to home today. F/u Urology and int med."    Since discharge pt has already f/u with Urology/Nephrology. Pt denies any flank pain. Urine still on the darker side but no sarahi blood.  Am blood sugar was 124  Review of Systems   Constitutional:  Negative for activity change, appetite change, chills, diaphoresis, fatigue, fever and unexpected weight change.   HENT:  Negative for postnasal drip, rhinorrhea, sinus pressure/congestion, sneezing, sore throat, trouble swallowing and voice change.    Respiratory:  Negative for cough, shortness of breath and wheezing.    Cardiovascular:  Positive for leg swelling (chronic). Negative for chest pain and palpitations.   Gastrointestinal:  Negative for abdominal pain, blood in stool, constipation, diarrhea, " nausea and vomiting.   Genitourinary:  Negative for dysuria.   Musculoskeletal:  Negative for arthralgias and myalgias.   Integumentary:  Negative for rash and wound.   Allergic/Immunologic: Negative for environmental allergies and food allergies.   Hematological:  Negative for adenopathy. Does not bruise/bleed easily.          Objective     Physical Exam  Constitutional:       General: He is not in acute distress.     Appearance: Normal appearance. He is well-developed. He is not diaphoretic.   HENT:      Head: Normocephalic and atraumatic.      Right Ear: External ear normal.      Left Ear: External ear normal.      Nose: Nose normal.      Mouth/Throat:      Pharynx: No oropharyngeal exudate.   Eyes:      General: No scleral icterus.        Right eye: No discharge.         Left eye: No discharge.      Conjunctiva/sclera: Conjunctivae normal.      Pupils: Pupils are equal, round, and reactive to light.   Neck:      Vascular: No JVD.   Cardiovascular:      Rate and Rhythm: Normal rate and regular rhythm.      Pulses: Normal pulses.      Heart sounds: Normal heart sounds. No murmur heard.  Pulmonary:      Effort: Pulmonary effort is normal. No respiratory distress.      Breath sounds: Normal breath sounds. No wheezing or rales.   Abdominal:      General: Bowel sounds are normal.      Tenderness: There is no abdominal tenderness. There is no guarding or rebound.   Musculoskeletal:      Cervical back: Normal range of motion and neck supple.      Right lower leg: Edema (2+) present.      Left lower leg: Edema (2+) present.   Lymphadenopathy:      Cervical: No cervical adenopathy.   Skin:     General: Skin is warm and dry.      Capillary Refill: Capillary refill takes less than 2 seconds.      Coloration: Skin is not pale.      Findings: No rash.   Neurological:      Mental Status: He is alert and oriented to person, place, and time. Mental status is at baseline.      Cranial Nerves: No cranial nerve deficit.    Psychiatric:         Mood and Affect: Mood normal.         Behavior: Behavior normal.            Assessment and Plan     1. Acute kidney injury superimposed on CKD    2. Diabetic polyneuropathy associated with type 2 diabetes mellitus    3. Aortic atherosclerosis    4. Atherosclerosis of aorta    5. Coronary artery disease due to calcified coronary lesion  Overview:  5 stents on ASA        6. Hx of CABG    7. Paroxysmal atrial fibrillation    8. Venous insufficiency of both lower extremities    9. Diabetic nephropathy associated with type 2 diabetes mellitus    10. 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 Type 2 diabetes mellitus with stage 3 chronic kidney disease, with long-term current use of insulin, unspecified whether stage 3a or 3b CKD    12. Type 2 diabetes mellitus with diabetic neuropathy, with long-term current use of insulin    13. Other chronic pancreatitis    14. Portal hypertension due to obstruction of extrahepatic portal vein    15. Lymphedema of both lower extremities    16. Sleep apnea, unspecified type    17. Obstructive uropathy    18. Ureteral stone    19. Hydronephrosis, unspecified hydronephrosis type    20. Other cirrhosis of liver    21. Hepatic fibrosis    22. Medically noncompliant    Other orders  -     Influenza - Quadrivalent (Adjuvanted)        ELIEZER/CKD- renal function improving, has seen Nephrology since discharge    Obstructive uropathy/right hydronephrosis- 2/2 kidney stone   -s/p removal with ureteral stent placement, followed by Urology    Ascites/portal HTN/Liver fibrosis- significant improvement s/p venoplasty for portal vein occlusion        Followed by Hepatology       T2DM with neuropathy/CKD- last A1C of 7.6(10/23)<--8.1(3/22)<--7.1(5/19)<--7.4(3/19)<--6.9(1/19)       -followed by Endo      CHF/PAF- stable   -pt declining anticoagulation, was advised of the risk      CAD with hx of CABG- stable, CPT      HTN- stable, CPT      HLD- stable       Morbid Obesity- pt advised on proper diet/exercise for weight loss      Chronic LE lymphedema with stasis dermatitis- stable      NC/NC Anemia- stable      BERHANE- pt not using his CPAP      Aortic atherosclerosis- stable      Chronic pancreatitis- stable       Medical noncompliance      F/u in 4 months for annual exam    Over 1/2 of 40 minute visit spent reviewing pt's medical records, education/discussion of pt's medical conditions and medical management

## 2023-10-28 PROBLEM — D69.2 OTHER NONTHROMBOCYTOPENIC PURPURA: Status: RESOLVED | Noted: 2022-01-13 | Resolved: 2023-10-28

## 2023-10-30 ENCOUNTER — LAB VISIT (OUTPATIENT)
Dept: LAB | Facility: HOSPITAL | Age: 68
End: 2023-10-30
Payer: MEDICARE

## 2023-10-30 ENCOUNTER — PATIENT MESSAGE (OUTPATIENT)
Dept: REHABILITATION | Facility: HOSPITAL | Age: 68
End: 2023-10-30
Payer: MEDICARE

## 2023-10-30 DIAGNOSIS — E11.69 HYPERLIPIDEMIA ASSOCIATED WITH TYPE 2 DIABETES MELLITUS: Chronic | ICD-10-CM

## 2023-10-30 DIAGNOSIS — E78.5 HYPERLIPIDEMIA ASSOCIATED WITH TYPE 2 DIABETES MELLITUS: Chronic | ICD-10-CM

## 2023-10-30 DIAGNOSIS — E10.65 TYPE 1 DIABETES MELLITUS WITH HYPERGLYCEMIA: ICD-10-CM

## 2023-10-30 LAB
ALBUMIN SERPL BCP-MCNC: 2.8 G/DL (ref 3.5–5.2)
ALP SERPL-CCNC: 120 U/L (ref 55–135)
ALT SERPL W/O P-5'-P-CCNC: 61 U/L (ref 10–44)
ANION GAP SERPL CALC-SCNC: 7 MMOL/L (ref 8–16)
AST SERPL-CCNC: 67 U/L (ref 10–40)
BILIRUB SERPL-MCNC: 0.3 MG/DL (ref 0.1–1)
BUN SERPL-MCNC: 30 MG/DL (ref 8–23)
CALCIUM SERPL-MCNC: 8.3 MG/DL (ref 8.7–10.5)
CHLORIDE SERPL-SCNC: 113 MMOL/L (ref 95–110)
CHOLEST SERPL-MCNC: 145 MG/DL (ref 120–199)
CHOLEST/HDLC SERPL: 2.5 {RATIO} (ref 2–5)
CO2 SERPL-SCNC: 20 MMOL/L (ref 23–29)
CREAT SERPL-MCNC: 2.6 MG/DL (ref 0.5–1.4)
EST. GFR  (NO RACE VARIABLE): 25.9 ML/MIN/1.73 M^2
ESTIMATED AVG GLUCOSE: 174 MG/DL (ref 68–131)
GLUCOSE SERPL-MCNC: 88 MG/DL (ref 70–110)
HBA1C MFR BLD: 7.7 % (ref 4–5.6)
HDLC SERPL-MCNC: 58 MG/DL (ref 40–75)
HDLC SERPL: 40 % (ref 20–50)
LDLC SERPL CALC-MCNC: 74.8 MG/DL (ref 63–159)
NONHDLC SERPL-MCNC: 87 MG/DL
POTASSIUM SERPL-SCNC: 4.5 MMOL/L (ref 3.5–5.1)
PROT SERPL-MCNC: 5.9 G/DL (ref 6–8.4)
SODIUM SERPL-SCNC: 140 MMOL/L (ref 136–145)
TRIGL SERPL-MCNC: 61 MG/DL (ref 30–150)

## 2023-10-30 PROCEDURE — 36415 COLL VENOUS BLD VENIPUNCTURE: CPT | Mod: PO | Performed by: NURSE PRACTITIONER

## 2023-10-30 PROCEDURE — 80061 LIPID PANEL: CPT | Performed by: NURSE PRACTITIONER

## 2023-10-30 PROCEDURE — 80053 COMPREHEN METABOLIC PANEL: CPT | Performed by: NURSE PRACTITIONER

## 2023-10-30 PROCEDURE — 83036 HEMOGLOBIN GLYCOSYLATED A1C: CPT | Performed by: NURSE PRACTITIONER

## 2023-10-31 ENCOUNTER — OFFICE VISIT (OUTPATIENT)
Dept: INTERNAL MEDICINE | Facility: CLINIC | Age: 68
End: 2023-10-31
Payer: MEDICARE

## 2023-10-31 VITALS
HEIGHT: 67 IN | TEMPERATURE: 97 F | WEIGHT: 294.56 LBS | SYSTOLIC BLOOD PRESSURE: 130 MMHG | OXYGEN SATURATION: 98 % | HEART RATE: 79 BPM | BODY MASS INDEX: 46.23 KG/M2 | DIASTOLIC BLOOD PRESSURE: 74 MMHG | RESPIRATION RATE: 19 BRPM

## 2023-10-31 DIAGNOSIS — Z96.41 INSULIN PUMP STATUS: ICD-10-CM

## 2023-10-31 DIAGNOSIS — N18.30 STAGE 3 CHRONIC KIDNEY DISEASE, UNSPECIFIED WHETHER STAGE 3A OR 3B CKD: Chronic | ICD-10-CM

## 2023-10-31 DIAGNOSIS — I50.43 ACUTE ON CHRONIC COMBINED SYSTOLIC AND DIASTOLIC HEART FAILURE: ICD-10-CM

## 2023-10-31 DIAGNOSIS — E11.22 TYPE 2 DIABETES MELLITUS WITH STAGE 3 CHRONIC KIDNEY DISEASE, WITH LONG-TERM CURRENT USE OF INSULIN, UNSPECIFIED WHETHER STAGE 3A OR 3B CKD: ICD-10-CM

## 2023-10-31 DIAGNOSIS — E11.69 HYPERLIPIDEMIA ASSOCIATED WITH TYPE 2 DIABETES MELLITUS: Chronic | ICD-10-CM

## 2023-10-31 DIAGNOSIS — E11.21 DIABETIC NEPHROPATHY ASSOCIATED WITH TYPE 2 DIABETES MELLITUS: ICD-10-CM

## 2023-10-31 DIAGNOSIS — G47.30 SLEEP APNEA, UNSPECIFIED TYPE: ICD-10-CM

## 2023-10-31 DIAGNOSIS — E13.9 LADA (LATENT AUTOIMMUNE DIABETES IN ADULTS), MANAGED AS TYPE 1: Primary | ICD-10-CM

## 2023-10-31 DIAGNOSIS — I48.0 PAROXYSMAL ATRIAL FIBRILLATION: ICD-10-CM

## 2023-10-31 DIAGNOSIS — Z79.4 TYPE 2 DIABETES MELLITUS WITH STAGE 3 CHRONIC KIDNEY DISEASE, WITH LONG-TERM CURRENT USE OF INSULIN, UNSPECIFIED WHETHER STAGE 3A OR 3B CKD: ICD-10-CM

## 2023-10-31 DIAGNOSIS — Z91.199 MEDICALLY NONCOMPLIANT: ICD-10-CM

## 2023-10-31 DIAGNOSIS — I89.0 LYMPHEDEMA OF BOTH LOWER EXTREMITIES: Chronic | ICD-10-CM

## 2023-10-31 DIAGNOSIS — E11.42 DIABETIC POLYNEUROPATHY ASSOCIATED WITH TYPE 2 DIABETES MELLITUS: ICD-10-CM

## 2023-10-31 DIAGNOSIS — N18.30 TYPE 2 DIABETES MELLITUS WITH STAGE 3 CHRONIC KIDNEY DISEASE, WITH LONG-TERM CURRENT USE OF INSULIN, UNSPECIFIED WHETHER STAGE 3A OR 3B CKD: ICD-10-CM

## 2023-10-31 DIAGNOSIS — E78.5 HYPERLIPIDEMIA ASSOCIATED WITH TYPE 2 DIABETES MELLITUS: Chronic | ICD-10-CM

## 2023-10-31 PROCEDURE — 1160F PR REVIEW ALL MEDS BY PRESCRIBER/CLIN PHARMACIST DOCUMENTED: ICD-10-PCS | Mod: CPTII,S$GLB,, | Performed by: NURSE PRACTITIONER

## 2023-10-31 PROCEDURE — 3008F BODY MASS INDEX DOCD: CPT | Mod: CPTII,S$GLB,, | Performed by: NURSE PRACTITIONER

## 2023-10-31 PROCEDURE — 99215 PR OFFICE/OUTPT VISIT, EST, LEVL V, 40-54 MIN: ICD-10-PCS | Mod: S$GLB,,, | Performed by: NURSE PRACTITIONER

## 2023-10-31 PROCEDURE — 3062F POS MACROALBUMINURIA REV: CPT | Mod: CPTII,S$GLB,, | Performed by: NURSE PRACTITIONER

## 2023-10-31 PROCEDURE — 3008F PR BODY MASS INDEX (BMI) DOCUMENTED: ICD-10-PCS | Mod: CPTII,S$GLB,, | Performed by: NURSE PRACTITIONER

## 2023-10-31 PROCEDURE — 99999 PR PBB SHADOW E&M-EST. PATIENT-LVL IV: ICD-10-PCS | Mod: PBBFAC,,, | Performed by: NURSE PRACTITIONER

## 2023-10-31 PROCEDURE — 1101F PT FALLS ASSESS-DOCD LE1/YR: CPT | Mod: CPTII,S$GLB,, | Performed by: NURSE PRACTITIONER

## 2023-10-31 PROCEDURE — 1101F PR PT FALLS ASSESS DOC 0-1 FALLS W/OUT INJ PAST YR: ICD-10-PCS | Mod: CPTII,S$GLB,, | Performed by: NURSE PRACTITIONER

## 2023-10-31 PROCEDURE — 99999 PR PBB SHADOW E&M-EST. PATIENT-LVL IV: CPT | Mod: PBBFAC,,, | Performed by: NURSE PRACTITIONER

## 2023-10-31 PROCEDURE — 99215 OFFICE O/P EST HI 40 MIN: CPT | Mod: S$GLB,,, | Performed by: NURSE PRACTITIONER

## 2023-10-31 PROCEDURE — 3066F NEPHROPATHY DOC TX: CPT | Mod: CPTII,S$GLB,, | Performed by: NURSE PRACTITIONER

## 2023-10-31 PROCEDURE — 3051F HG A1C>EQUAL 7.0%<8.0%: CPT | Mod: CPTII,S$GLB,, | Performed by: NURSE PRACTITIONER

## 2023-10-31 PROCEDURE — 1111F DSCHRG MED/CURRENT MED MERGE: CPT | Mod: CPTII,S$GLB,, | Performed by: NURSE PRACTITIONER

## 2023-10-31 PROCEDURE — 95251 PR GLUCOSE MONITOR, 72 HOUR, PHYS INTERP: ICD-10-PCS | Mod: S$GLB,,, | Performed by: NURSE PRACTITIONER

## 2023-10-31 PROCEDURE — 3078F DIAST BP <80 MM HG: CPT | Mod: CPTII,S$GLB,, | Performed by: NURSE PRACTITIONER

## 2023-10-31 PROCEDURE — 1159F MED LIST DOCD IN RCRD: CPT | Mod: CPTII,S$GLB,, | Performed by: NURSE PRACTITIONER

## 2023-10-31 PROCEDURE — 1111F PR DISCHARGE MEDS RECONCILED W/ CURRENT OUTPATIENT MED LIST: ICD-10-PCS | Mod: CPTII,S$GLB,, | Performed by: NURSE PRACTITIONER

## 2023-10-31 PROCEDURE — 3062F PR POS MACROALBUMINURIA RESULT DOCUMENTED/REVIEW: ICD-10-PCS | Mod: CPTII,S$GLB,, | Performed by: NURSE PRACTITIONER

## 2023-10-31 PROCEDURE — 3075F SYST BP GE 130 - 139MM HG: CPT | Mod: CPTII,S$GLB,, | Performed by: NURSE PRACTITIONER

## 2023-10-31 PROCEDURE — 3288F FALL RISK ASSESSMENT DOCD: CPT | Mod: CPTII,S$GLB,, | Performed by: NURSE PRACTITIONER

## 2023-10-31 PROCEDURE — 3051F PR MOST RECENT HEMOGLOBIN A1C LEVEL 7.0 - < 8.0%: ICD-10-PCS | Mod: CPTII,S$GLB,, | Performed by: NURSE PRACTITIONER

## 2023-10-31 PROCEDURE — 1160F RVW MEDS BY RX/DR IN RCRD: CPT | Mod: CPTII,S$GLB,, | Performed by: NURSE PRACTITIONER

## 2023-10-31 PROCEDURE — 1159F PR MEDICATION LIST DOCUMENTED IN MEDICAL RECORD: ICD-10-PCS | Mod: CPTII,S$GLB,, | Performed by: NURSE PRACTITIONER

## 2023-10-31 PROCEDURE — 3288F PR FALLS RISK ASSESSMENT DOCUMENTED: ICD-10-PCS | Mod: CPTII,S$GLB,, | Performed by: NURSE PRACTITIONER

## 2023-10-31 PROCEDURE — 3078F PR MOST RECENT DIASTOLIC BLOOD PRESSURE < 80 MM HG: ICD-10-PCS | Mod: CPTII,S$GLB,, | Performed by: NURSE PRACTITIONER

## 2023-10-31 PROCEDURE — 3066F PR DOCUMENTATION OF TREATMENT FOR NEPHROPATHY: ICD-10-PCS | Mod: CPTII,S$GLB,, | Performed by: NURSE PRACTITIONER

## 2023-10-31 PROCEDURE — 3075F PR MOST RECENT SYSTOLIC BLOOD PRESS GE 130-139MM HG: ICD-10-PCS | Mod: CPTII,S$GLB,, | Performed by: NURSE PRACTITIONER

## 2023-10-31 PROCEDURE — 95251 CONT GLUC MNTR ANALYSIS I&R: CPT | Mod: S$GLB,,, | Performed by: NURSE PRACTITIONER

## 2023-10-31 RX ORDER — INSULIN LISPRO-AABC 100 [IU]/ML
80 INJECTION, SOLUTION INTRAVENOUS; SUBCUTANEOUS CONTINUOUS
Qty: 80 ML | Refills: 6 | Status: SHIPPED | OUTPATIENT
Start: 2023-10-31

## 2023-10-31 RX ORDER — INSULIN LISPRO 100 [IU]/ML
5 INJECTION, SOLUTION INTRAVENOUS; SUBCUTANEOUS
Qty: 15 ML | Refills: 3 | Status: SHIPPED | OUTPATIENT
Start: 2023-10-31

## 2023-10-31 RX ORDER — INSULIN LISPRO 100 [IU]/ML
5 INJECTION, SOLUTION INTRAVENOUS; SUBCUTANEOUS 3 TIMES DAILY
COMMUNITY
Start: 2023-10-17 | End: 2023-10-31 | Stop reason: SDUPTHER

## 2023-10-31 RX ORDER — FAMOTIDINE 20 MG/1
20 TABLET, FILM COATED ORAL 2 TIMES DAILY
COMMUNITY
Start: 2023-08-14 | End: 2024-02-19

## 2023-10-31 NOTE — PATIENT INSTRUCTIONS
Continue on omnipod 5 - connected with dexcom g6 -   Try to STAY in automated mode.   Bolus insulin each time you eat - before you eat is best or right with the food.   (If you wait, the glucose is going to go up).     So bolus bolus bolus.     For back up use -   In the case of pump failure, you should give back up insulin -   Levemir 30 units every day and humalog 5 units before each meal - until you get back on your insulin pump.     For low blood sugar - remember to keep glucose tablets on hand. You can purchase these at the pharmacy check out desk/over the counter.   If blood sugar is less than 70, take/eat 2 - 3 tablets quickly to bring your sugar up.   Always keep 15 grams of Quick acting carbohydrates on hand to eat/drink if your sugar is low - examples are 1/2 cup of juice, coke, or crackers, granola bars.   Remember to eat meals frequently to prevent low blood blood sugars.

## 2023-10-31 NOTE — PROGRESS NOTES
68 y.o. male here for 3 month follow up for management of NATTY type 1 diabetes.   Last seen 2023 - those notes are below.     A1c stable at 7.7% - has risen up slowly.   Also cgm shows 50%+ high glucose readings.   He was in the hospital recently for Brian. Was found to have kidney stones, and got a stent placed.   Has 1 stone in each kidney.     History of pancreatitis in 2018 - so he has not used  glp1s or dpp4's.   We have not used sglt2i's on him b/c of NATTY status either -   See below: cpeptide present, just low.   C-Peptide 0.78 - 5.19 ng/mL 0.67 Low     His Pelon is negative.     Decreased bg target from 120 to 110. To help get him closer to target range.   This is ongoing.   Decreased active insulin time from 4 hours to 3 hours at last visit - no changes today.   Currently staying in automated mode - needs to bolus more.   Enouraged for all meals and snacks.     Omnipod 5 - glooko report - see scanned in .  Total daily dose - 43.8 units/day -   70% coming from basal rate.   30% coming from bolus   73% in automated mode.   Bg target is 110   Basal rate 1.1 units/hour during the night time.   1.2 units/hour during the day time.      Dexcom g6 - downloaded and reviewed today -   See scanned in .   Average glucose is 217 -   37% time in range.   0% lows.   33% highs.   30% extreme highs.       Last visit notes as follows from 2023 -   67 y.o. gentleman, here for 6 week follow up for management of T1dm - natty.   Last seen 2023 - those notes below.     A1c is at 7.4% -   improving, last checked 2023 -   Continues on omnipod 5 with dexcom g6 - closed loop insulin pump system.   He gets his supplies from the pharmacy and fiasp vials of insulin -     Lots of stress recently - dog  last week/so very down about this.   Then 2nd pet passed away just 1 day later.   His inlaws have passed away recently/ is today so his wife is out of town to deal with  "this/family issues.   His right leg is still giving him pain. Vascular issues plus lymphedema.  His cellulitis is better - he had finished a 2nd course of doxyxcyline and followed with woundcare  He also had covid and his wife had covid in the interim - so just a really hard month for him.   He is still riding his motorcycles right now, thinking of selling them, also likes his corvet.     On omnipod 5 - see scanned in glooko report -   Total daily dose is 38.4 units/day   75% is coming from basal rate.   25% is coming from bolusing.   Active insulin time is 3 hours -   12am - 1.1 units/hour -   7am - 1.2 units/hour -   Carb ratio - 1 gram/1 unit (he puts in boluses to eat).   He gives 6 or 8 units for a meal - small or large meal.   (He is not giving/using correction boluses it seems for when his sugar is high) or if he skips/misses it.   Rarely will give 10 units.   ISF at 40 -   He sometimes doesn't have a basal rate going (per the report - as the pump/pod will /run out of insulin - and he can't hear very well - so doesn't hear the alarms). He fills his pod up all the way with 200 units    He is still using "presets" - I have him at 1: 1 -  Entering in 2 boluses on average per day - he usually waits until after he eats to bolus/it's hard to remember is all.     Dexcom g6 -   See scanned in  -   Average glucose is 228 -   33% time in range.   0% lows.   31% highs.   36% very highs.   He is notably higher it seems this week - stress with personal life is what he attributes this to.         Last visit notes from 2023 -   67 y.o. gentleman, here for 3 month follow up for management of t1dm - ELOISE and history of pancreatitis   Last seen 2023 - those notes below.     Today, was an add on visit - b/c he is concerned about infection or swelling in his legs.   He is feeling like legs are swollen, but actually "better" overall -   He had admission for cellulitis in 2023 - and " "finished his antibiotics for this.   Had gone to the woundcare clinic in [place] in the past, but wants a referral to return back.   Usually wears BRANNON hose daily -   See photos below of his legs -   States he took an oral antibiotic about 6 weeks ago - finished the course.   Left leg is worse than the right.     A1c last in April was 8.1%. (that is improved from 8.9% last year).   He continues on omnipod - was on dash  and now is on omnipod 5 with dexcom g6   Using with lyumjev insulin.   He doesn't truly "carb count" - uses preset boluses - we have CR setting as 1:1 so he can accomplish this.   Often forgets to bolus or isn't always staying in automated mode.   His glucose will go high, and then he will go into manual mode and forget to go back into automated mode.     TDD - 28.3 units/day   88% is coming from basal rate.   12% is coming from bolusing.   74% of the day he stays in automated mode -   26% in manual mode. -   Entering in around 6.7 carbs per day (but this is presets - he is not doing carb counting).   Active insulin time is 4 hours.   12am - 1.1 units/hour -   7am - 1.2 units/hour -   Carb ratio set at 1: 10 -   Bg target set at 120 -   ISF set at 40   Bg correction 200 -   Max basal rate is 2 units/hour -       Dexcom g6 downloaded and reviewed today -   See scanned in .   Average glucose 199 -   31% highs.   21% very highs.   47% time in range.   <1% lows.   0% very lows        Last seen in March 2023 -   67 y.o. male here for routine follow up for Type 1 -   Last seen august 2022 - those notes are below.     A1c still elevated at 8.9%   Currently on omnipod dash - began on pump from [employer] as of 8/17/2022 -   He changes about every 3 days.   Does not follow diabetic diet.     Pump downloaded and reviewed -   Total dose is 42.1 untis/day.   60% is coming from basal rate.   40% is coming from bolusing   2.5 boluses per day on average.   Active insulin time is 4 hours.   Basal rate is 1.1 " units/hour.   ISF is at 50   Carb ratio 1: 15 - however he doesn't carb count -   Has bolus presets -   2 unit snack.   4 units small meal   6 unit medium meal.   8 units large meal.     Dexcom g6 - [place] [facility]   Average glucose 251 -   27% TIR   0% lows.   26% highs   47% very high.     He said that he's been told that he can get his dexcom from the pharmacy. So he wants me to send prescription there for him for his dexcom supplies.         Last visit notes from August 2022:   66 y.o. male here for 6 week follow up for management of T2dm - ELOISE - with low cpeptide levels in past.   He has been insulin dependent -   Last seen 6/29/222 - those notes below.     His a1c had gone up from 8.1% to 11.5%.   No new labs have been done for today's visit.   History of pancreatitis in 2018 - so he has not used  glp1s or dpp4's.   We have not used sglt2i's on him b/c of ELOISE status - but I am not opposed to using.   See below: cpeptide present, just low.   C-Peptide 0.78 - 5.19 ng/mL 0.67 Low     His Pelon is negative.     Continues on a vgo 30 and we had planned for him to get an omnipod to get better control of insulin needs/hyperglycemic episodes.   Reports giving still just 2 clicks with meals. He's scared to give too many clicks for fear of lows.   He has still not met with the diabetes educator to start on omnipod dash.   has his supplies - dash pods and pdm, and his lyumjev vial - he just not had made an appointment.   He continues to have hyperglycemia.     In the interim - he's suffered with kidney stones and is following with urology -   Also is following closely with woundcare, lymphedema in bilat. Lower extremities.       continues on Dexcom g6 - personal   downloaded personal and reviewed today -   Average glucose is 251 -   27% time in range.   <1% lows.   25% highs.   47% very highs.       Last visit notes as follows from 6/29/2022:   66 y.o. male here for 3 month follow up visit for  "management of T1dm -   ELOISE - formerly treated as type 2 -   History of pancreatitis.   Last seen in March 2022 - those notes are below.     a1c has been elevated in past, now extremely high   - up from 8.1% now to 11.5%   He's been very stressed since last visit -   Mother in law passed away - he's been very busy cleaning out her house.   Working a lot/sweating - states his vgo patch is just falling off.   He is nervous to be low/go low, so sometimes doesn't click with his meals.   Also got an infection in his left foot and in his calf in the interim - it has been treated.   But he isn't sure if his sugars were high prior and that's what caused the infection, or if the infection is what has caused his sugars to go high.     He is on vgo 30 still - using humalog insulin with it.   Usually gives 2 or 4 clicks with meals.   Often only "clicks" 1 or 2 times per day.   He denies any known low sugars, except for if he boluses "too much".   He is not following Ada diet.     Using dexcom g6 cgm - downloaded and reviewed today -   Using with his cellphone - see scanned in .     Average glucose 275 -   15% time in range.   0% lows.   23% highs.   61% very highs        Last visit notes as follows from march 2022:   66 y.o. gentleman, here for 3 month follow up visit oer routine for Eloise - T1dm -   Last seen in December 2021 - those notes are below.     a1c decreased from 8.6% to 8.1%.   He increased from the vgo 20 to vgo 30 - still using humalog insulin   That has helped his overnight sugars, but he is still high during the day.   He often gives 4 clicks instead of 3 clicks with meals.   States sometimes he runs out of insulin in his patch if he gives too much.   We had discussed omnipod at his last visit, but he wanted to try the higher dose vgo patch.   He is not following Ada diet -     On dexcom G6 personal to check his sugars  Downloaded and reviewed today -   See scanned in  -   Average " glucose 239 -   30% time in range.   <1% lows.   27% highs.   42% very highs.       Last visit notes from December 2021 -   66 y.o. T1dm - ELOISE - 3 month follow up.   Last seen in Septemer 2021 - those notes are velow.     His a1c remains elevated still @ 8.6%   Continues on VGO 20 -   States using same insulin - humalog -  4 clicks with meals.   No longer on metformin.   He has history of pancreatitis (2018) -  So cannot take glp1 or dpp4's -     He is not following ADA diet.   Eats out often, fried foods, high carb foods.   States he occasionally has lows when he gives boluses after he eats instead of before the meal.   He complains of right shoulder pain today -   Follows with Dr. Diaz - and is seeing him for kidney stone follow up.   Also c/o ED - has used viagra in past. Requests appt. With Dr. Diaz    Continues on Dexcom G6 - see scanned in .   Average glucose 258 -   24% time in range.   1% lows.   <1% very low. (states following a bolus/correction bolus).   26% highs.   48% extreme highs.       Last visit notes as follows from September 2021:   HPI: Jian Arrieta is a 68 y.o.  male c/I for visit to address Diabetes Type 1 (ELOISE adult onset)   This is the first time I am seeing him for care, follows with Leon Sadler, DO for primary care needs.   Has seen endocrinology - Luisa Garcia NP in past - last visit was 8/9/2021 - those notes are below.  Had seen Dr. Carney and dr. Stauffer with endocrinology in past prior visits.     he is establishing care with me today however.   Met last with GINO Machado 8/23/2021 -     was diagnosed with T2DM originally in 2016 -   He has history of pancreatitis in 2018 - portal vein thrombosis and underwent angioplasty.   now treated as T1 - ELOISE -   Had gallbladder removed as well.     Is on insulin only now via vgo patch - on VGO 20    Has never been hospitalized r/t DM.  Denies missing doses of DM medication - however admits he misses he meal  time dose of insulin.     Hgba1c has increased from 7.7%  to 8.6%- now improved to 8.3% currently.   Not always following ADA diet. Eats out 2 - 3 times per day.   Local restaurants/pastas/fried food/waffle house.   vgo 20 - 2 - 3 clicks with breakfast.   4 clicks with lunch and dinner.   1 click with a snack.     He reports forgetting to click sometimes - and waits to click after meals.   Also fearful of hypoglycemia - so will under click.   Working in yard/increased activity or sweating - has low blood sugars.   He has baqsimi to correct     On dexcom g6 for checking sugars - downloaded and reviewed today -   Average glucose 243  32% time in range.   0% lows.   24% highs.   44% extreme highs.     Complications from diabetes and pertinent co-morbidities include:   Negative for DKA.   Has been on insulin for several years.   + for diabetic neuropathy.   + for nephropathy - CKD stage 3   + microalbuminuria.   Eyes:  negative for Diabetic retinopathy   CV: Denies history of MI nor stroke.   CAD: yes and history of CABG   Also heart failure history   Takes aspirin 81mg tablet daily  BP: has history of HTN  Statin: Taking  ACE/ARB: Not taking    Last notes from RADHA Garcia from August 2021 as follows:   Pt returns for follow up of type 2 diabetes complicated by Charcot foot, foot ulcer-now resolved, kidney stone, BMI 39, CKD stage III, s/p sleeve gastrectomy that is uncontrolled and complicated.  Previously seen by Dr. Carney and Dr. Stauffer and myself. Last visit in April 2021.   He has dentures!!    He had hydrocele repair on 6/16/2021 -- also had kidney stones at the time. Had stent placed and removed. States he has kidney stones on the left and will be seeing Dr. Diaz.      With regards to the diabetes:   Diagnosed with Type 2 DM in 2016  Episode of pancreatitis around 2018,   Family History of Type 2 DM: none  Known complications:   DKA/HHS: none  RN: none; 6/10/21 -  PN: +; was seeing podiatry in the  past  Nephropathy: + sees nephrology-- needs appt  Gastroparesis: none  CAD: CABG around 2016  Diabetes complications: CKD stage III, s/p sleeve gastrectomy   Current regimen:  Vgo 20 4 clicks with oatmeal and 3 clicks with breakfast; 4 clicks with lunch and dinner; 1-2 clicks with a snack; sometimes more for a snack   He is not running out of clicks at the end of the day.    He is sometimes taking Vgo off overnight to prevent hypoglycemia. States he is sometimes having hypoglycemia between 1-4AM. States he started doing this a couple of months ago.Takes off around 9-10PM and wakes up at 4-5AM and puts new Vgo on at 6AM. He is eating around 5-6 AM and puts on Vgo after he eats. In the past he was wearing Vgo for over 24 hours, still doing this.    He is fearful of hypoglycemia and overcorrecting.   Missed doses-missing clicks as above   Other medications tried:  Metformin   He is checking BG 4 times daily  Wearing Dexcom today. See media tab for report  His blood sugars are elevated after breakfast and throughout the day. He is putting on a new device sometimes after he eats breakfast which contributes to his hyperglycemia. He is having hyperglycemia at times overnight and he will take off Vgo.   He forgetting to click at times or clicking after he eats.  On days that he forgets to click, his blood sugars stay persistently high. His blood sugars come down nicely when he clicks.    He reports Dexcom and Vgo is falling off due to sweating badly.   Fasting:    Patient feels unwell with blood sugars less than 110. He loses ability to function with low blood sugars. His lowest blood sugar has been in the 40s (around Jan 2020). Seen at [facility].    Dietary recall: He is not following diabetic diet. Appetite is good. Does not eat as much as before.   Eats 3 meals a day.   B: 5AM-waffle house or oatmeal or grits  L: 11:30  D: 7PM  Snacks: Grazing during the day  Drinks: tea and water    Exercise - tried to stay  active.  Active in the yard, garage.    Education - last visit: 2021 -EZEQUIEL Palacios/[place]: has    Social History     Tobacco Use   Smoking Status Former    Current packs/day: 0.00    Average packs/day: 2.0 packs/day for 30.0 years (60.0 ttl pk-yrs)    Types: Cigarettes    Start date: 1975    Quit date: 2005    Years since quittin.7   Smokeless Tobacco Never     Past medical History:   Past Medical History:   Diagnosis Date    Alcohol abuse     Anasarca 2019    Anemia     Anticoagulant long-term use     Arthropathy associated with neurological disorder 2015    Atherosclerosis     Charcot foot due to diabetes mellitus     Chronic combined systolic and diastolic heart failure 2019 Left VentricleModerate decreased ejection fraction at 30%. Normal left ventricular cavity size. Normal wall thickness observed. Grade I (mild) left ventricular diastolic dysfunction consistent with impaired relaxation. Normal left atrial pressure. Moderate, global hypokinesis(see wall scoring diagram). Right VentricleNormal cavity size, wall thickness and ejection fraction. Wall motion n    Chronic pancreatitis 2019    CKD (chronic kidney disease) stage 3, GFR 30-59 ml/min     CKD (chronic kidney disease) stage 4, GFR 15-29 ml/min     Colon polyps     approx 5 yrs ago    Coronary artery disease due to calcified coronary lesion 2015    5 stents on ASA      Diabetic polyneuropathy associated with type 2 diabetes mellitus 2015    Diverticulosis 2019    DM type 2 with diabetic peripheral neuropathy 2019    Encounter for blood transfusion     Essential hypertension 2019    Former smoker 2015    Healed ulcer of left foot on examination 2017    Hematuria     Hydrocele     approx 1.5 yrs ago    Hyperphosphatemia     Hypoalbuminemia 2019    Hypocalcemia     Lymphedema of both lower extremities 2019    Mixed hyperlipidemia 2015    Morbid obesity  with BMI of 50.0-59.9, adult 5/8/2015    Obstruction of right ureteropelvic junction (UPJ) due to stone 5/24/2021    Onychomycosis of multiple toenails with type 2 diabetes mellitus and peripheral neuropathy 6/20/2017    Perianal cyst     approx 2 yrs ago    Proteinuria     Pseudocyst of pancreas 1/28/2019 1-28-19 Liver has a cirrhotic morphology with no focal lesions.  Significant interval increase in ascites when compared to prior exam which may account for patient's abdominal distension.  Hypodense air-fluid collection along the body of the pancreas which is slightly smaller when compared to prior CT.  Findings may relate to pancreatic necrosis with pancreatic pseudocysts with infected pseudocyst    Skin cancer     skin cancer    Sleep apnea 8/26/2015    Status post bariatric surgery 1/11/2016    Type 2 diabetes mellitus, with long-term current use of insulin 5/8/2015    Urinary tract infection       Family hx:   Family History   Problem Relation Age of Onset    Cancer Mother     Cancer Father     Heart disease Father     Obesity Sister     Parkinsonism Brother     No Known Problems Paternal Grandmother     Cancer Paternal Grandfather     Cancer Brother     Diabetes Maternal Grandmother     Stroke Maternal Grandfather     Cirrhosis Neg Hx       Current meds:   Current Outpatient Medications:     acetaminophen (TYLENOL) 325 MG tablet, Take 2 tablets (650 mg total) by mouth every 8 (eight) hours as needed for Pain., Disp: 30 tablet, Rfl: 0    amLODIPine (NORVASC) 10 MG tablet, Take 1 tablet (10 mg total) by mouth once daily., Disp: 30 tablet, Rfl: 11    blood pressure monitor Kit, 1 kit by Misc.(Non-Drug; Combo Route) route 2 (two) times a day., Disp: 1 each, Rfl: 0    blood sugar diagnostic (ONETOUCH VERIO TEST STRIPS) Strp, 1 each by Misc.(Non-Drug; Combo Route) route 2 (two) times a day., Disp: 70 each, Rfl: 3    blood-glucose sensor (DEXCOM G6 SENSOR) Sandi, Inject 1 each into the skin every 10 days., Disp: 3  "each, Rfl: 11    blood-glucose transmitter (DEXCOM G6 TRANSMITTER) Sandi, Inject 1 each into the skin every 10 days., Disp: 1 each, Rfl: 3    ciprofloxacin HCl (CIPRO) 500 MG tablet, Take 1 tablet (500 mg total) by mouth every 12 (twelve) hours. for 14 days, Disp: 28 tablet, Rfl: 0    famotidine (PEPCID) 20 MG tablet, Take 20 mg by mouth 2 (two) times daily., Disp: , Rfl:     glucagon (BAQSIMI) 3 mg/actuation Spry, 1 each by Nasal route daily as needed (if glucose is less than 70 - can repeat in 1 hour if still low/less than 70)., Disp: 2 each, Rfl: 3    HUMALOG KWIKPEN INSULIN 100 unit/mL pen, Inject 5 Units into the skin 3 (three) times daily., Disp: , Rfl:     hydrALAZINE (APRESOLINE) 25 MG tablet, Take 1 tablet (25 mg total) by mouth every 8 (eight) hours., Disp: 90 tablet, Rfl: 11    insulin aspart U-100 (NOVOLOG) 100 unit/mL (3 mL) InPn pen, Inject 0-5 Units into the skin before meals and at bedtime as needed (Hyperglycemia)., Disp: 5 mL, Rfl: 0    insulin aspart U-100 (NOVOLOG) 100 unit/mL (3 mL) InPn pen, Inject 5 Units into the skin 3 (three) times daily., Disp: 4.5 mL, Rfl: 11    insulin detemir U-100, Levemir, 100 unit/mL (3 mL) SubQ InPn pen, Inject 18 Units into the skin every evening., Disp: 5.4 mL, Rfl: 11    insulin pump cart,automated,BT (OMNIPOD 5 G6 PODS, GEN 5,) Crtg, Inject 1 each into the skin every other day. Use with omnipod 5 pdm as directed., Disp: 15 each, Rfl: 11    lancets (ONETOUCH DELICA PLUS LANCET) 33 gauge Misc, 1 lancet  by Misc.(Non-Drug; Combo Route) route 2 (two) times daily., Disp: 70 each, Rfl: 3    pen needle, diabetic 32 gauge x 5/32" Ndle, Use with toujeo insulin one daily only as Emergency use/back up insulin - if OFF OF your insulin pump., Disp: 30 each, Rfl: 3    tamsulosin (FLOMAX) 0.4 mg Cap, Take 1 capsule (0.4 mg total) by mouth every evening., Disp: 90 capsule, Rfl: 3    aspirin (ECOTRIN) 81 MG EC tablet, Take 1 tablet (81 mg total) by mouth once daily., Disp: 9 " "tablet, Rfl: 0     Current Diabetes medications:   Omnipod 5 - rate is at 1.1 units/hour.   1.2 units/hour during the day  Doesn't count carbs - 1: 1 ratio.   lyumjev insulin -     Medications Tried and Failed:   Metformin  Hx pancreatitis - cannot use dpp4 or glp1's.   VGO 30 patch - (was on a vgo 20) - giving around 4 clicks with meals.   Humalog - then switched to lyumjev.     Social:   Lives at home with: wife  Life changes/stressors currently. Did mention his mother in law passed away recently of stroke. Helping clean out her house. Also suffered recent foot and left leg infection.   Diet: not following ADA diet   Meals: 3 per day and snacks.        Breakfast - waffle house special - eggs, biscuit, cheese, grits, hashbrowns.        Lunch - eats out/pastas/rice - red bean plates, etc.        Dinner - eats out every night - [employer]        Snacks 1 - 2 times per day.   Exercise: nothing formally - but works in yard/garage.    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     Glucose Monitoring:   Checking sugars 4x/day via Dexcom g6 - see scanned in .   Gets supplies via mail - has [employer]. Now has changed to [employer].     Standards of care:   Eyes: .: 06/10/2021  Foot exam: : 09/21/2021   Diabetes education: yes - and for vgo start and omnipod dash start.     Vital Signs  /74 (BP Location: Left arm, Patient Position: Sitting, BP Method: Large (Manual))   Pulse 79   Temp 97.1 °F (36.2 °C) (Temporal)   Resp 19   Ht 5' 7" (1.702 m)   Wt 133.6 kg (294 lb 8.6 oz)   SpO2 98%   BMI 46.13 kg/m²     Pertinent Labs:   Hgba1c   Lab Results   Component Value Date    HGBA1C 7.7 (H) 10/30/2023    HGBA1C 7.6 (H) 10/09/2023    HGBA1C 7.4 (H) 07/06/2023     Lipid panel   Lab Results   Component Value Date    CHOL 145 10/30/2023    CHOL 152 04/06/2023    CHOL 155 03/15/2022     Lab Results   Component Value Date    HDL 58 10/30/2023    HDL 62 04/06/2023    HDL 65 03/15/2022 "     Lab Results   Component Value Date    LDLCALC 74.8 10/30/2023    LDLCALC 79.4 04/06/2023    LDLCALC 77.2 03/15/2022     Lab Results   Component Value Date    TRIG 61 10/30/2023    TRIG 53 04/06/2023    TRIG 64 03/15/2022     Lab Results   Component Value Date    CHOLHDL 40.0 10/30/2023    CHOLHDL 40.8 04/06/2023    CHOLHDL 41.9 03/15/2022      CMP  Glucose   Date Value Ref Range Status   10/30/2023 88 70 - 110 mg/dL Final     BUN   Date Value Ref Range Status   10/30/2023 30 (H) 8 - 23 mg/dL Final     Creatinine   Date Value Ref Range Status   10/30/2023 2.6 (H) 0.5 - 1.4 mg/dL Final     eGFR if    Date Value Ref Range Status   06/22/2022 44.0 (A) >60 mL/min/1.73 m^2 Final     eGFR    Date Value Ref Range Status   02/20/2023 33 (L) >=90 mL/min Final     Comment:     Calculation based on the Chronic Kidney Disease Epidemiology Collaboration (CKD-EPI) equation refit without adjustment for race.     eGFR if non    Date Value Ref Range Status   06/22/2022 38.1 (A) >60 mL/min/1.73 m^2 Final     Comment:     Calculation used to obtain the estimated glomerular filtration  rate (eGFR) is the CKD-EPI equation.        AST   Date Value Ref Range Status   10/30/2023 67 (H) 10 - 40 U/L Final     ALT   Date Value Ref Range Status   10/30/2023 61 (H) 10 - 44 U/L Final     Microalbumin creatinine ratio:   Lab Results   Component Value Date    MICALBCREAT 2734.0 (H) 10/30/2023       Review Of Systems:   Gen: Appetite good, no weight gain or loss, + fatigue, Denies polydipsia.  Skin: Skin is intact and heals well, denies any rashes or hair changes.   EENT: Denies any acute visual disturbances, nor blurred vision.   Resp: Denies SOB or Dyspnea on exertion, denies cough.   Cardiac: Denies chest pain, palpitations, + 2 edema to bilat. Lower ext. Recent cellulitis infection. Now weeping. - worse on the left leg. Improving over past couple weeks - wearing BRANNON hose./   GI: Denies  abdominal pain, nausea or vomiting, diarrhea, or constipation.   /GYN: Denies nocturia, nor burning, frequency or pain on urination. + c/o ED still.   MS/Neuro: Denies numbness/ tingling in BLE; Gait unsteady, speech clear  Psych: Denies drug/ETOH abuse, no hx of depression. Sadness/loss of pets x 2 and inlaws passed away/increased stress at home.   Other systems: negative.    Physical Exam:   GENERAL: Well developed, well nourished in appearance.   PSYCH: AAOx3, appropriate mood and affect, pleasant expression, conversant, appears relaxed, well groomed.   EYES: PERRL, Conjunctiva and corneas clear  NECK: Soft and Supple, trachea midline  CHEST: Even, regular, and unlabored respirations  ABDOMEN: Soft, non-tender, non-distended.   VASCULAR: pedal pulses palpable bilaterally, + edema dependent bilaterally. The left leg is larger circumference still than the right.   NEURO:  cranial nerves II - XII intact   MUSCULOSKELETAL: Good ROM, steady gait.   SKIN: Skin warm, dry, and intact - no further drainage noted.    Assessment and Plan of Care:     Jian was seen today for dm f/u.    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 T1 - ELOISE - formerly treated as T2DM with hyperglycemia- Hgba1c goal is 7.5% or less without hypoglycemia -  7.6%---> 8.6%--> 8.3%--> 8.6% --> 8.1%  --> 11. 5% --> 8.3%--->8.9%--> 8.1% --> 7.4% currently.  Improving on closed loop pump system.   discussed DM, progression of disease, long term complications, CV risk factors and tx options.   Advise compliance with ADA diet and encourage exercise- encourage proper food intake/but he likes to eat out a lot.   Continue on omnipod 5- lyumjev.   Automated mode. Try to bolus/not miss and Stay in automoated mode. Reviewed how to give the correction boluses -   Encouraged to bolus before meals not after.   No change in rates.   Decreased bg target from 120 to 110. To help get him closer to target range. Did that at last visit - no changes today.   Decreased active insulin time from 4 hours to 3 hours at last visit - no changes today.   Currently staying in automated mode - needs to bolus more. Enouraged for all meals and snacks.   RX sent for toujeo =- 30 unitsonce daily as back up insulin pen for emergency use with humalog   Had prescribed farxiga - but he never started - will leave off for now. This could be a risk for him -   His renal function is too fluid/unpredictable.   Discussed MOA, possible side effects including yeast infection, UTI, dehydration and ketoacidosis, importance of maintaining hydration and avoiding No carb diets. Good use hygiene. Notify my office if any side effects. Will monitor renal function closely. Stable at present.   Reviewed  hypoglycemia, s/s and appropriate tx. Have/get quick acting glucose tablets at hand.   Instructed to monitor Blood glucose 2 - 4x/day and bring meter/ log to every clinic visit.   Continue on dexcom G6 personal cgm. Gets from pharmacy.   A CGM is MEDICALLY NECESSARY as it will allow me to virtually and more closely monitor glucose readings  This enables me to make any necessary adjustments to the patients diabetes regimen while keeping the patient and staff safe during the COVID-19 PHE.    2. HTN & Heart failure - usually  controlled, did not take meds yet today -    continue meds as previously prescribed and monitor.   + urine mac  History of afib.   + history of CAD and hx cabg, as well as venous insufficiency bilat. Lower extremeties.   Last saw cardiology - Dr. Saavedra on 6/14/22.      3. Lipids- LDL goal < 100. At goal.   Currently on statin therapy    4. Weight - BMI Body mass index is 46.13 kg/m².   Encourage Ada diet and exercise as tolerated.   Cannot do glp1 b/c of history of pancreatitis. I will consider it though in future considering his high risk profile.   Has had bariatric surgery prior.     5. Renal Function - ckd - stage 3 -   + microalbuminuria. No changes. See nephrology.     6,. Recent left leg and foot infection -   Suspect recurrent cellulitis -   Needs to be treated -   healing/likely r/t hyperglycemia.    hx venous insufficiency and lymphedema.   Referral placed for woundcare clinic placed today - to re-evaluation.   Finished another course of doxy bid x 10 days-   \  7. hx fatty liver disease, cirrhosis, and resultatnt - pancreatitis - has followed with liver -   Right now stable trends -       Labs and OV in 3 months time.

## 2023-11-02 ENCOUNTER — PATIENT MESSAGE (OUTPATIENT)
Dept: REHABILITATION | Facility: HOSPITAL | Age: 68
End: 2023-11-02

## 2023-11-02 ENCOUNTER — CLINICAL SUPPORT (OUTPATIENT)
Dept: REHABILITATION | Facility: HOSPITAL | Age: 68
End: 2023-11-02
Payer: MEDICARE

## 2023-11-02 DIAGNOSIS — I87.2 VENOUS INSUFFICIENCY OF BOTH LOWER EXTREMITIES: Primary | ICD-10-CM

## 2023-11-02 DIAGNOSIS — N18.30 TYPE 2 DIABETES MELLITUS WITH STAGE 3 CHRONIC KIDNEY DISEASE, WITH LONG-TERM CURRENT USE OF INSULIN, UNSPECIFIED WHETHER STAGE 3A OR 3B CKD: ICD-10-CM

## 2023-11-02 DIAGNOSIS — E11.22 TYPE 2 DIABETES MELLITUS WITH STAGE 3 CHRONIC KIDNEY DISEASE, WITH LONG-TERM CURRENT USE OF INSULIN, UNSPECIFIED WHETHER STAGE 3A OR 3B CKD: ICD-10-CM

## 2023-11-02 DIAGNOSIS — Z79.4 TYPE 2 DIABETES MELLITUS WITH STAGE 3 CHRONIC KIDNEY DISEASE, WITH LONG-TERM CURRENT USE OF INSULIN, UNSPECIFIED WHETHER STAGE 3A OR 3B CKD: ICD-10-CM

## 2023-11-02 PROCEDURE — 97535 SELF CARE MNGMENT TRAINING: CPT

## 2023-11-08 ENCOUNTER — LAB VISIT (OUTPATIENT)
Dept: LAB | Facility: HOSPITAL | Age: 68
End: 2023-11-08
Attending: INTERNAL MEDICINE
Payer: MEDICARE

## 2023-11-08 DIAGNOSIS — N18.4 CHRONIC KIDNEY DISEASE, STAGE 4 (SEVERE): ICD-10-CM

## 2023-11-08 LAB
25(OH)D3+25(OH)D2 SERPL-MCNC: 23 NG/ML (ref 30–96)
ALBUMIN SERPL BCP-MCNC: 3 G/DL (ref 3.5–5.2)
ALP SERPL-CCNC: 131 U/L (ref 55–135)
ALT SERPL W/O P-5'-P-CCNC: 96 U/L (ref 10–44)
ANION GAP SERPL CALC-SCNC: 11 MMOL/L (ref 8–16)
AST SERPL-CCNC: 82 U/L (ref 10–40)
BASOPHILS # BLD AUTO: 0.04 K/UL (ref 0–0.2)
BASOPHILS NFR BLD: 0.8 % (ref 0–1.9)
BILIRUB SERPL-MCNC: 0.3 MG/DL (ref 0.1–1)
BUN SERPL-MCNC: 27 MG/DL (ref 8–23)
CALCIUM SERPL-MCNC: 8.5 MG/DL (ref 8.7–10.5)
CHLORIDE SERPL-SCNC: 113 MMOL/L (ref 95–110)
CO2 SERPL-SCNC: 19 MMOL/L (ref 23–29)
CREAT SERPL-MCNC: 2.8 MG/DL (ref 0.5–1.4)
DIFFERENTIAL METHOD: ABNORMAL
EOSINOPHIL # BLD AUTO: 0.2 K/UL (ref 0–0.5)
EOSINOPHIL NFR BLD: 4.5 % (ref 0–8)
ERYTHROCYTE [DISTWIDTH] IN BLOOD BY AUTOMATED COUNT: 13.7 % (ref 11.5–14.5)
EST. GFR  (NO RACE VARIABLE): 23.7 ML/MIN/1.73 M^2
FERRITIN SERPL-MCNC: 124 NG/ML (ref 20–300)
GLUCOSE SERPL-MCNC: 208 MG/DL (ref 70–110)
HCT VFR BLD AUTO: 34.2 % (ref 40–54)
HGB BLD-MCNC: 10.8 G/DL (ref 14–18)
IMM GRANULOCYTES # BLD AUTO: 0.02 K/UL (ref 0–0.04)
IMM GRANULOCYTES NFR BLD AUTO: 0.4 % (ref 0–0.5)
IRON SERPL-MCNC: 120 UG/DL (ref 45–160)
LYMPHOCYTES # BLD AUTO: 1.4 K/UL (ref 1–4.8)
LYMPHOCYTES NFR BLD: 26.6 % (ref 18–48)
MCH RBC QN AUTO: 29.6 PG (ref 27–31)
MCHC RBC AUTO-ENTMCNC: 31.6 G/DL (ref 32–36)
MCV RBC AUTO: 94 FL (ref 82–98)
MONOCYTES # BLD AUTO: 0.4 K/UL (ref 0.3–1)
MONOCYTES NFR BLD: 8.3 % (ref 4–15)
NEUTROPHILS # BLD AUTO: 3.2 K/UL (ref 1.8–7.7)
NEUTROPHILS NFR BLD: 59.4 % (ref 38–73)
NRBC BLD-RTO: 0 /100 WBC
PHOSPHATE SERPL-MCNC: 4.4 MG/DL (ref 2.7–4.5)
PLATELET # BLD AUTO: 150 K/UL (ref 150–450)
PMV BLD AUTO: 11.5 FL (ref 9.2–12.9)
POTASSIUM SERPL-SCNC: 5 MMOL/L (ref 3.5–5.1)
PROT SERPL-MCNC: 6.2 G/DL (ref 6–8.4)
PTH-INTACT SERPL-MCNC: 127.1 PG/ML (ref 9–77)
RBC # BLD AUTO: 3.65 M/UL (ref 4.6–6.2)
SATURATED IRON: 40 % (ref 20–50)
SODIUM SERPL-SCNC: 143 MMOL/L (ref 136–145)
TOTAL IRON BINDING CAPACITY: 303 UG/DL (ref 250–450)
TRANSFERRIN SERPL-MCNC: 205 MG/DL (ref 200–375)
WBC # BLD AUTO: 5.3 K/UL (ref 3.9–12.7)

## 2023-11-08 PROCEDURE — 83970 ASSAY OF PARATHORMONE: CPT | Performed by: INTERNAL MEDICINE

## 2023-11-08 PROCEDURE — 84466 ASSAY OF TRANSFERRIN: CPT | Performed by: INTERNAL MEDICINE

## 2023-11-08 PROCEDURE — 85025 COMPLETE CBC W/AUTO DIFF WBC: CPT | Performed by: INTERNAL MEDICINE

## 2023-11-08 PROCEDURE — 82728 ASSAY OF FERRITIN: CPT | Performed by: INTERNAL MEDICINE

## 2023-11-08 PROCEDURE — 80053 COMPREHEN METABOLIC PANEL: CPT | Performed by: INTERNAL MEDICINE

## 2023-11-08 PROCEDURE — 82306 VITAMIN D 25 HYDROXY: CPT | Performed by: INTERNAL MEDICINE

## 2023-11-08 PROCEDURE — 84100 ASSAY OF PHOSPHORUS: CPT | Performed by: INTERNAL MEDICINE

## 2023-11-08 PROCEDURE — 36415 COLL VENOUS BLD VENIPUNCTURE: CPT | Mod: PO | Performed by: INTERNAL MEDICINE

## 2023-11-08 NOTE — PROGRESS NOTES
See evaluation in POC for goals and assessment     Eval Date: 11/08/2023    Luisa Thompson, PT, DPT, CLT

## 2023-11-09 ENCOUNTER — PATIENT MESSAGE (OUTPATIENT)
Dept: NEPHROLOGY | Facility: CLINIC | Age: 68
End: 2023-11-09
Payer: MEDICARE

## 2023-11-09 ENCOUNTER — PATIENT MESSAGE (OUTPATIENT)
Dept: REHABILITATION | Facility: HOSPITAL | Age: 68
End: 2023-11-09
Payer: MEDICARE

## 2023-11-09 NOTE — PLAN OF CARE
Physical Therapy Daily Treatment Note/ POC Update      Name: Jian Arrieta  [facility] Number: [id number]    Therapy Diagnosis:   Encounter Diagnoses   Name Primary?    Venous insufficiency of both lower extremities Yes    Type 2 diabetes mellitus with stage 3 chronic kidney disease, with long-term current use of insulin, unspecified whether stage 3a or 3b CKD        Physician: Savanah Felton DPM    Visit Date: 11/2/2023    Physician: Savanah Felton DPM     Physician Orders: PT Eval and Treat - lymphedema  Medical Diagnosis from Referral: I89.0 (ICD-10-CM) - Lymphedema of both lower extremities    L03.119 (ICD-10-CM) - Cellulitis of lower extremity, unspecified laterality     Evaluation Date: 7/31/2023  Authorization Period Expiration: 12/31/2023  Plan of Care Expiration: 11/30/2023  Visit # / Visits authorized: 13/ 30     Time In: 11:00am  Time Out: 11:40am  Total Billable Time: 40 minutes     Precautions: Standard and Diabetes    Subjective     Pt reports: He was urged by his  nephrologist to go to the ER and he was in the hospital for a week. His dexcom and omnipod have not been working and he hasn't been getting inslulin. He was finally able to see someone to fix the who machines.     He has has major leg swelling since then.  He still has kidney stones and a stent in one ureter     He was compliant with home exercise program.    Response to previous treatment: notes good response to bandaging.   Functional change: wearing temporary compression / velcro wraps     Pain: 0/10  Location: bilateral lower legs     Objective     Small scratch on LLE           11/2/2023:                     Treatment:     Self care home management for 10'  - Importance of daily use of compression - pt admits difficulty reaching feet and will need assistance   - Pt purchased velcro wraps for BLEs , XL regular length R and LLE  - infection education, signs and symptoms   - effects of lymph drainage/ CDT on kidneys   - need for  clearance for returning to regular lymph therapy       Education provided:    Remove if painful   PATIENT/FAMILY Education: bandaging wear schedule,  HEP,  Beginning of self massage,  Self or assisted bandaging, compression options, and Risk reduction    Written Home Exercises Provided: Patient instructed to cont prior HEP.  Exercises were reviewed and Jian was able to demonstrate them prior to the end of the session.  Jian demonstrated good  understanding of the education provided.     Assessment     Pt returns to clinic after extended hospital stay for acute kindey injury. Pt presents with increased swelling, and redness. Due to pt's kidney status, pt will need MD clearance before returning to therapy. Pt was educated on home management and signs of worsening infection in the mean time. Pt's MD contacted requesting clearance. Requesting extension in POC to reduce pt's edema and reduce the risk of further infections.     Pt would benefit from therapy and management of other health concerns to reduce edema, aid in finding compression and preparing for home management     Jian Is progressing well towards his goals.   Pt prognosis is Good.     Pt will continue to benefit from skilled outpatient physical therapy to address the deficits listed in the problem list box on initial evaluation, provide pt/family education and to maximize pt's level of independence in the home and community environment.   Pt's spiritual, cultural and educational needs considered and pt agreeable to plan of care and goals.     Anticipated barriers to physical therapy: skin integrity- will check regularly for skin integrity     Goals:     Short Term Goals: (6 weeks)  1. Patient will show decreased girth in B LE by up to 1 cm to allow for LE symmetry, shoe and clothing choice, and ability to apply needed compression.  (progressing, not met)   2. Patient will demonstrate 100% knowledge of lymphedema precautions and signs of infection to allow for  reduced lymphedema risk, infection risk, and/or exacerbation of condition.  (progressing, not met)  3. Patient or caregiver will perform self-bandaging techniques and/or wearing of compression garments to allow for lymphatic drainage support, skin elasticity, and reduction in shape and size of limb. (progressing, not met)  4. Patient will perform self lymph drainage techniques to areas within reach to enhance lymphatic drainage and skin condition.  (progressing, not met)  5. Patient will tolerate daily activities with multilayered bandaging to allow for lymphatic and venous support.  (progressing, not met)     Long Term Goals: (12  weeks)  1. Patient will show decreased girth in B LE by up to 2 cm  to allow for LE symmetry, shoe and clothing choice, and ability to apply needed compression daily.  (progressing, not met)  2. Patient will show reduction in density to mild or less with improved contour of limb to allow for cosmesis, LE symmetry, infection risk reduction, and clothing and compression choice.   (progressing, not met)  3. Patient to krystal/doff compression garment with daily compliance to assist in lymphedema management, skin elasticity, and tissue density.  (progressing, not met)  4. Pt to show improved postural awareness and alignment.  (progressing, not met)  5. Pt to be I and compliant with HEP to allow for increased function in affected limb.   (progressing, not met)    Plan   Continue PT  2x   weekly for Complete Decongestive Therapy:  Manual lymphatic drainage, Multilayered short stretch bandaging, Pneumatic compression, Therapeutic exercises, Patient education as deemed necessary to achieve stated goals.      Luisa Thompson, PT

## 2023-11-13 ENCOUNTER — TELEPHONE (OUTPATIENT)
Dept: INTERNAL MEDICINE | Facility: CLINIC | Age: 68
End: 2023-11-13

## 2023-11-13 ENCOUNTER — TELEPHONE (OUTPATIENT)
Dept: REHABILITATION | Facility: HOSPITAL | Age: 68
End: 2023-11-13
Payer: MEDICARE

## 2023-11-13 NOTE — TELEPHONE ENCOUNTER
----- Message from Luisa Thompson PT sent at 10/30/2023  9:00 AM CDT -----  Regarding: Clearance for lymphedema PT  Good Morning Dr. Barajas     I have been seeing Mr. Villar for physical therapy for his lymphedema. Recently Mr. Villar had an extended stay in the hospital due to an acute kidney injury. We usually hold off on lymph PT because the bandaging we use to reduce pt's swelling causes extra strain on the kidneys, who need to process this fluid. Due to the acute nature of his health status I wanted to ask for clearance on when we would be safe to continue therapy. Please let me know if you have any questions or concerns and thank you for your time     Luisa Thompson, PT, DPT, CLT

## 2023-11-14 NOTE — TELEPHONE ENCOUNTER
Pt called in regards to letting pt know he is cleared for therapy. Pt reports he is having significant swelling going up to buttocks area and is not urinating more than 6 oz. Pt does not have MD's number so asked therapist to send message. Pt reports swelling is not hot to touch or red.  Scheduled pt for visit.     Luisa Thompson, PT, DPT, CLT

## 2023-11-16 ENCOUNTER — CLINICAL SUPPORT (OUTPATIENT)
Dept: REHABILITATION | Facility: HOSPITAL | Age: 68
End: 2023-11-16
Payer: MEDICARE

## 2023-11-16 DIAGNOSIS — Z79.4 TYPE 2 DIABETES MELLITUS WITH STAGE 3 CHRONIC KIDNEY DISEASE, WITH LONG-TERM CURRENT USE OF INSULIN, UNSPECIFIED WHETHER STAGE 3A OR 3B CKD: ICD-10-CM

## 2023-11-16 DIAGNOSIS — I87.2 VENOUS INSUFFICIENCY OF BOTH LOWER EXTREMITIES: Primary | ICD-10-CM

## 2023-11-16 DIAGNOSIS — E11.22 TYPE 2 DIABETES MELLITUS WITH STAGE 3 CHRONIC KIDNEY DISEASE, WITH LONG-TERM CURRENT USE OF INSULIN, UNSPECIFIED WHETHER STAGE 3A OR 3B CKD: ICD-10-CM

## 2023-11-16 DIAGNOSIS — N18.30 TYPE 2 DIABETES MELLITUS WITH STAGE 3 CHRONIC KIDNEY DISEASE, WITH LONG-TERM CURRENT USE OF INSULIN, UNSPECIFIED WHETHER STAGE 3A OR 3B CKD: ICD-10-CM

## 2023-11-16 PROCEDURE — 29581 APPL MULTLAYER CMPRN SYS LEG: CPT

## 2023-11-16 PROCEDURE — 97140 MANUAL THERAPY 1/> REGIONS: CPT | Mod: 59

## 2023-11-16 NOTE — PROGRESS NOTES
Physical Therapy Daily Treatment Note     Name: Jian JASMINE [facility] Number: [id number]    Therapy Diagnosis:   Encounter Diagnoses   Name Primary?    Venous insufficiency of both lower extremities Yes    Type 2 diabetes mellitus with stage 3 chronic kidney disease, with long-term current use of insulin, unspecified whether stage 3a or 3b CKD      Physician: Savanah Felton DPM    Visit Date: 11/16/2023    Physician: Savanah Felton DPM     Physician Orders: PT Eval and Treat - lymphedema  Medical Diagnosis from Referral: I89.0 (ICD-10-CM) - Lymphedema of both lower extremities    L03.119 (ICD-10-CM) - Cellulitis of lower extremity, unspecified laterality     Evaluation Date: 7/31/2023  Authorization Period Expiration: 12/31/2023  Plan of Care Expiration: 11/30/2023  Visit # / Visits authorized: 13/ 30     Time In: 4:30pm  Time Out: 5:40pm   Total Billable Time: 60 minutes     Precautions: Standard and Diabetes    Subjective     Pt reports: He has been doing ok, he had his nose surgery .  He was compliant with home exercise program.  Response to previous treatment: fine  Functional change: none    Pain: 0/10  Location: bilateral lower legs     Objective       Treatment:   Jian received the following manual therapy techniques:- Manual Lymphatic Drainage were applied to the: LLE for 60 minutes, including: MLD and short stretch compression bandaging       MANUAL LYMPHATIC DRAINAGE (MLD):    While supine with LEs elevated stimulation at terminus, along GI region, B inguinal regions, drainage of entire L LE betina lower leg, ankle, and foot with return proximally,  Use of Aquaphor due to dryness.   Educated in self massage to abdominal areas, B inguinal areas, thigh, and remaining LE within reach.      MULTILAYERED BANDAGING:  issued supplies and bandaged B LE with cotton stockinette, komprex section dorsum of foot, 2 komprex wedges post malleoli, 2 komprex rolls ankle to knee, 1-8cm and 2- 10cm Durelast rolls foot  to knee, to leave intact 12-24 hrs as tolerated, discontinue with any problems, return rolled bandages next session. Wash and wear schedules confirmed.       Home Exercises Provided and Patient Education Provided     Education provided:    Remove bandages if painful  PATIENT/FAMILY Education: bandaging wear schedule,  HEP,  Beginning of self massage,  Self or assisted bandaging, compression options, and Risk reduction    Written Home Exercises Provided: Patient instructed to cont prior HEP.  Exercises were reviewed and Jian was able to demonstrate them prior to the end of the session.  Jian demonstrated good  understanding of the education provided.       Assessment     Pt presents with flare up of leg swelling extending into B thighs, mostly likely due to recent ELIEZER. Pt's BLEs were bandaged and pt tolerated bandaging and MLD well with no reports of pain. Will continue to progress   Jian Is progressing well towards his goals.   Pt prognosis is Fair.     Pt will continue to benefit from skilled outpatient physical therapy to address the deficits listed in the problem list box on initial evaluation, provide pt/family education and to maximize pt's level of independence in the home and community environment.     Pt's spiritual, cultural and educational needs considered and pt agreeable to plan of care and goals.     Anticipated barriers to physical therapy: none    Goals:        Short Term Goals: (6 weeks)  1. Patient will show decreased girth in B LE by up to 1 cm to allow for LE symmetry, shoe and clothing choice, and ability to apply needed compression.  (progressing, not met)   2. Patient will demonstrate 100% knowledge of lymphedema precautions and signs of infection to allow for reduced lymphedema risk, infection risk, and/or exacerbation of condition.  (progressing, not met)  3. Patient or caregiver will perform self-bandaging techniques and/or wearing of compression garments to allow for lymphatic drainage  support, skin elasticity, and reduction in shape and size of limb. (progressing, not met)  4. Patient will perform self lymph drainage techniques to areas within reach to enhance lymphatic drainage and skin condition.  (progressing, not met)  5. Patient will tolerate daily activities with multilayered bandaging to allow for lymphatic and venous support.  (progressing, not met)     Long Term Goals: (12  weeks)  1. Patient will show decreased girth in B LE by up to 2 cm  to allow for LE symmetry, shoe and clothing choice, and ability to apply needed compression daily.  (progressing, not met)  2. Patient will show reduction in density to mild or less with improved contour of limb to allow for cosmesis, LE symmetry, infection risk reduction, and clothing and compression choice.   (progressing, not met)  3. Patient to krystal/doff compression garment with daily compliance to assist in lymphedema management, skin elasticity, and tissue density.  (progressing, not met)  4. Pt to show improved postural awareness and alignment.  (progressing, not met)  5. Pt to be I and compliant with HEP to allow for increased function in affected limb.   (progressing, not met)    Plan   Continue PT  2x   weekly for Complete Decongestive Therapy:  Manual lymphatic drainage, Multilayered short stretch bandaging, Pneumatic compression, Therapeutic exercises, Patient education as deemed necessary to achieve stated goals.      Luisa Thompson, PT

## 2023-11-17 DIAGNOSIS — R60.9 EDEMA, UNSPECIFIED TYPE: Primary | ICD-10-CM

## 2023-11-17 DIAGNOSIS — R80.9 PROTEINURIA, UNSPECIFIED TYPE: ICD-10-CM

## 2023-11-17 DIAGNOSIS — R31.9 HEMATURIA, UNSPECIFIED TYPE: ICD-10-CM

## 2023-11-17 RX ORDER — TORSEMIDE 20 MG/1
20 TABLET ORAL DAILY
Qty: 30 TABLET | Refills: 0 | Status: SHIPPED | OUTPATIENT
Start: 2023-11-17 | End: 2024-01-04 | Stop reason: SDUPTHER

## 2023-11-17 NOTE — PROGRESS NOTES
The patient reported continuous legs swelling and weight gain from 260 to 300 over the past several months. His obstruction I now s/p ureteral stent. Echo during hospitalization showed normal EF. Thus, the swelling can be associated with nephrotic syndrome.  Proteinuria work up (CAROLYNN, dsDNA, C3, C4, RPR, hep B/C, SPEP, SFLC ratio) has been neg so far. Torsemide 20 mg daily prescribed today and will repeat labs in 5 days including repeat urine analysis with culture to rule out signs of infection prior to considering biopsy.

## 2023-11-20 ENCOUNTER — CLINICAL SUPPORT (OUTPATIENT)
Dept: REHABILITATION | Facility: HOSPITAL | Age: 68
End: 2023-11-20
Payer: MEDICARE

## 2023-11-20 ENCOUNTER — TELEPHONE (OUTPATIENT)
Dept: NEPHROLOGY | Facility: CLINIC | Age: 68
End: 2023-11-20
Payer: MEDICARE

## 2023-11-20 DIAGNOSIS — N18.30 TYPE 2 DIABETES MELLITUS WITH STAGE 3 CHRONIC KIDNEY DISEASE, WITH LONG-TERM CURRENT USE OF INSULIN, UNSPECIFIED WHETHER STAGE 3A OR 3B CKD: ICD-10-CM

## 2023-11-20 DIAGNOSIS — Z79.4 TYPE 2 DIABETES MELLITUS WITH STAGE 3 CHRONIC KIDNEY DISEASE, WITH LONG-TERM CURRENT USE OF INSULIN, UNSPECIFIED WHETHER STAGE 3A OR 3B CKD: ICD-10-CM

## 2023-11-20 DIAGNOSIS — I87.2 VENOUS INSUFFICIENCY OF BOTH LOWER EXTREMITIES: Primary | ICD-10-CM

## 2023-11-20 DIAGNOSIS — E11.22 TYPE 2 DIABETES MELLITUS WITH STAGE 3 CHRONIC KIDNEY DISEASE, WITH LONG-TERM CURRENT USE OF INSULIN, UNSPECIFIED WHETHER STAGE 3A OR 3B CKD: ICD-10-CM

## 2023-11-20 PROCEDURE — 29581 APPL MULTLAYER CMPRN SYS LEG: CPT

## 2023-11-20 PROCEDURE — 97140 MANUAL THERAPY 1/> REGIONS: CPT | Mod: 59

## 2023-11-20 NOTE — PROGRESS NOTES
Physical Therapy Daily Treatment Note     Name: Jian JASMINE [facility] Number: [id number]    Therapy Diagnosis:   Encounter Diagnoses   Name Primary?    Venous insufficiency of both lower extremities Yes    Type 2 diabetes mellitus with stage 3 chronic kidney disease, with long-term current use of insulin, unspecified whether stage 3a or 3b CKD      Physician: Savanah Felton DPM    Visit Date: 11/20/2023    Physician: Savanah Felton DPM     Physician Orders: PT Eval and Treat - lymphedema  Medical Diagnosis from Referral: I89.0 (ICD-10-CM) - Lymphedema of both lower extremities    L03.119 (ICD-10-CM) - Cellulitis of lower extremity, unspecified laterality     Evaluation Date: 7/31/2023  Authorization Period Expiration: 12/31/2023  Plan of Care Expiration: 11/30/2023  Visit # / Visits authorized: 16/ 30     Time In: 9:00am  Time Out: 10:00am   Total Billable Time: 60 minutes     Precautions: Standard and Diabetes    Subjective     Pt reports: He needs some tape for his nose, he has been using transparent tape and the gauze is sticking to his surgical site   He was compliant with home exercise program.  Response to previous treatment: fine  Functional change: none    Pain: 0/10  Location: bilateral lower legs     Objective       Treatment:   Jian received the following manual therapy techniques:- Manual Lymphatic Drainage were applied to the: BLE for 60 minutes, including: MLD and short stretch compression bandaging       MANUAL LYMPHATIC DRAINAGE (MLD):    While supine with LEs elevated stimulation at terminus, along GI region, B inguinal regions, drainage of entire R LE betina lower leg, ankle, and foot with return proximally,  Use of Aquaphor due to dryness.   Educated in self massage to abdominal areas, B inguinal areas, thigh, and remaining LE within reach.      MULTILAYERED BANDAGING:  issued supplies and bandaged B LE with cotton stockinette, komprex section dorsum of foot, 2 komprex wedges post malleoli, 2  komprex rolls ankle to knee, 1-8cm and 2- 10cm Durelast rolls foot to knee, to leave intact 12-24 hrs as tolerated, discontinue with any problems, return rolled bandages next session. Wash and wear schedules confirmed.     Pt given hypafix skin safe tape and telfa non adherent gauze    Pt's nose after dressing change              Home Exercises Provided and Patient Education Provided     Education provided:    Remove bandages if painful  PATIENT/FAMILY Education: bandaging wear schedule,  HEP,  Beginning of self massage,  Self or assisted bandaging, compression options, and Risk reduction    Written Home Exercises Provided: Patient instructed to cont prior HEP.  Exercises were reviewed and Jian was able to demonstrate them prior to the end of the session.  Jian demonstrated good  understanding of the education provided.       Assessment     Pt presents with flare up of leg swelling extending into B thighs, mostly likely due to recent ELIEZER. Pt's BLEs were bandaged and pt tolerated bandaging and MLD well with no reports of pain. Pt was given some skin safe tape to use on his nose as he was using tape that could have been iritating his skin. Pt instructed to bring velcro wraps for next session to check fit Will continue to progress   Jian Is progressing well towards his goals.   Pt prognosis is Fair.     Pt will continue to benefit from skilled outpatient physical therapy to address the deficits listed in the problem list box on initial evaluation, provide pt/family education and to maximize pt's level of independence in the home and community environment.     Pt's spiritual, cultural and educational needs considered and pt agreeable to plan of care and goals.     Anticipated barriers to physical therapy: none    Goals:        Short Term Goals: (6 weeks)  1. Patient will show decreased girth in B LE by up to 1 cm to allow for LE symmetry, shoe and clothing choice, and ability to apply needed compression.  (progressing,  not met)   2. Patient will demonstrate 100% knowledge of lymphedema precautions and signs of infection to allow for reduced lymphedema risk, infection risk, and/or exacerbation of condition.  (progressing, not met)  3. Patient or caregiver will perform self-bandaging techniques and/or wearing of compression garments to allow for lymphatic drainage support, skin elasticity, and reduction in shape and size of limb. (progressing, not met)  4. Patient will perform self lymph drainage techniques to areas within reach to enhance lymphatic drainage and skin condition.  (progressing, not met)  5. Patient will tolerate daily activities with multilayered bandaging to allow for lymphatic and venous support.  (progressing, not met)     Long Term Goals: (12  weeks)  1. Patient will show decreased girth in B LE by up to 2 cm  to allow for LE symmetry, shoe and clothing choice, and ability to apply needed compression daily.  (progressing, not met)  2. Patient will show reduction in density to mild or less with improved contour of limb to allow for cosmesis, LE symmetry, infection risk reduction, and clothing and compression choice.   (progressing, not met)  3. Patient to krystal/doff compression garment with daily compliance to assist in lymphedema management, skin elasticity, and tissue density.  (progressing, not met)  4. Pt to show improved postural awareness and alignment.  (progressing, not met)  5. Pt to be I and compliant with HEP to allow for increased function in affected limb.   (progressing, not met)    Plan   Continue PT  2x   weekly for Complete Decongestive Therapy:  Manual lymphatic drainage, Multilayered short stretch bandaging, Pneumatic compression, Therapeutic exercises, Patient education as deemed necessary to achieve stated goals.      Luisa Thompson, PT

## 2023-11-20 NOTE — TELEPHONE ENCOUNTER
Hi Mr. Arrieta,     I reviewed your lab work. Your kidney function has been about the same level which is good. Your urine has some blood and white cells which can be related with the recent stent insertion. Please let me know if there is any urinary symptoms. Please go to the ER if severe lower belly pain or fever noted.     Please, continue taking your medications as prescribed, and come to your follow-up visits as scheduled. Please feel free to contact me if you have further questions. Take care.     Warm regards,  Alvino    This Patient Portal message has not been read.  Additional Documentation    Encounter Info: Billing Info,     History,     Detailed Report,     Education,     Care Plan,     Al

## 2023-11-21 ENCOUNTER — TELEPHONE (OUTPATIENT)
Dept: NEPHROLOGY | Facility: CLINIC | Age: 68
End: 2023-11-21
Payer: MEDICARE

## 2023-11-22 ENCOUNTER — CLINICAL SUPPORT (OUTPATIENT)
Dept: REHABILITATION | Facility: HOSPITAL | Age: 68
End: 2023-11-22
Payer: MEDICARE

## 2023-11-22 DIAGNOSIS — N18.30 TYPE 2 DIABETES MELLITUS WITH STAGE 3 CHRONIC KIDNEY DISEASE, WITH LONG-TERM CURRENT USE OF INSULIN, UNSPECIFIED WHETHER STAGE 3A OR 3B CKD: ICD-10-CM

## 2023-11-22 DIAGNOSIS — Z79.4 TYPE 2 DIABETES MELLITUS WITH STAGE 3 CHRONIC KIDNEY DISEASE, WITH LONG-TERM CURRENT USE OF INSULIN, UNSPECIFIED WHETHER STAGE 3A OR 3B CKD: ICD-10-CM

## 2023-11-22 DIAGNOSIS — I87.2 VENOUS INSUFFICIENCY OF BOTH LOWER EXTREMITIES: Primary | ICD-10-CM

## 2023-11-22 DIAGNOSIS — E11.22 TYPE 2 DIABETES MELLITUS WITH STAGE 3 CHRONIC KIDNEY DISEASE, WITH LONG-TERM CURRENT USE OF INSULIN, UNSPECIFIED WHETHER STAGE 3A OR 3B CKD: ICD-10-CM

## 2023-11-22 PROCEDURE — 97140 MANUAL THERAPY 1/> REGIONS: CPT | Mod: CQ

## 2023-11-22 PROCEDURE — 97535 SELF CARE MNGMENT TRAINING: CPT | Mod: CQ

## 2023-11-22 NOTE — PROGRESS NOTES
Physical Therapy Daily Treatment Note     Name: Jian JASMINE [facility] Number: [id number]    Therapy Diagnosis:   Encounter Diagnoses   Name Primary?    Venous insufficiency of both lower extremities Yes    Type 2 diabetes mellitus with stage 3 chronic kidney disease, with long-term current use of insulin, unspecified whether stage 3a or 3b CKD        Physician: Savanah Felton DPM    Visit Date: 11/22/2023    Physician: Savanah Felton DPM     Physician Orders: PT Eval and Treat - lymphedema  Medical Diagnosis from Referral: I89.0 (ICD-10-CM) - Lymphedema of both lower extremities    L03.119 (ICD-10-CM) - Cellulitis of lower extremity, unspecified laterality     Evaluation Date: 7/31/2023  Authorization Period Expiration: 12/31/2023  Plan of Care Expiration: 11/30/2023  Visit # / Visits authorized: 17/ 30     Time In: 8:00 am   Time Out: 9:00 am  Total Billable Time: 60 minutes      Precautions: Standard and Diabetes    Subjective     Pt reports: one of his legs was down some after removing the bandages. He started on fluid pills 2-3 days ago .   He was compliant with home exercise program.  Response to previous treatment: good response.  Functional change: none    Pain: 0/10  Location: bilateral lower legs     Objective       Treatment:   Jian received the following manual therapy techniques:- Manual Lymphatic Drainage were applied to the: BLE for 40 minutes, including: MLD and short stretch compression bandaging       MANUAL LYMPHATIC DRAINAGE (MLD):    While supine with LEs elevated stimulation at terminus, along GI region, B inguinal regions, drainage of entire LLE betina lower leg, ankle, and foot with return proximally,  Use of Aquaphor due to dryness.   Educated in self massage to abdominal areas, B inguinal areas, thigh, and remaining LE within reach.      MULTILAYERED BANDAGING: not performed pt left at home and brought velcro wraps to apply       Home Exercises Provided and Patient Education Provided      Self care/home management for 20 minutes :   - Pt brings compreflex XL velcro wraps   - Donned tubigrip with double fold on foot and ankle . Donned velcro wrap to RLE over tubigrip with education on donning/doffing and proper fit . Pt self applied tubigrip and velcro wrap to LLE with therapist guidance.   - Advised to apply in the morning and can remove before bed .     Education provided:    Remove bandages if painful  PATIENT/FAMILY Education: bandaging wear schedule,  HEP,  Beginning of self massage,  Self or assisted bandaging, compression options, and Risk reduction    Written Home Exercises Provided: Patient instructed to cont prior HEP.  Exercises were reviewed and Jian was able to demonstrate them prior to the end of the session.  Jian demonstrated good  understanding of the education provided.       Assessment     Pt presents with increased swelling secondary to recent ELIEZER . Education on needs for daily use of compression to help prevent swelling from worsening. Pt brought in his velcro wraps today and time spent education and practicing donning/doffing. Encouraged to apply in the morning and remove before bed , remove if any adverse effects . Will continue to progress .     Jian Is progressing well towards his goals.   Pt prognosis is Fair.     Pt will continue to benefit from skilled outpatient physical therapy to address the deficits listed in the problem list box on initial evaluation, provide pt/family education and to maximize pt's level of independence in the home and community environment.   Pt's spiritual, cultural and educational needs considered and pt agreeable to plan of care and goals.     Anticipated barriers to physical therapy: none    Goals:        Short Term Goals: (6 weeks)  1. Patient will show decreased girth in B LE by up to 1 cm to allow for LE symmetry, shoe and clothing choice, and ability to apply needed compression.  (progressing, not met)   2. Patient will demonstrate 100%  knowledge of lymphedema precautions and signs of infection to allow for reduced lymphedema risk, infection risk, and/or exacerbation of condition.  (progressing, not met)  3. Patient or caregiver will perform self-bandaging techniques and/or wearing of compression garments to allow for lymphatic drainage support, skin elasticity, and reduction in shape and size of limb. (progressing, not met)  4. Patient will perform self lymph drainage techniques to areas within reach to enhance lymphatic drainage and skin condition.  (progressing, not met)  5. Patient will tolerate daily activities with multilayered bandaging to allow for lymphatic and venous support.  (progressing, not met)     Long Term Goals: (12  weeks)  1. Patient will show decreased girth in B LE by up to 2 cm  to allow for LE symmetry, shoe and clothing choice, and ability to apply needed compression daily.  (progressing, not met)  2. Patient will show reduction in density to mild or less with improved contour of limb to allow for cosmesis, LE symmetry, infection risk reduction, and clothing and compression choice.   (progressing, not met)  3. Patient to krystal/doff compression garment with daily compliance to assist in lymphedema management, skin elasticity, and tissue density.  (progressing, not met)  4. Pt to show improved postural awareness and alignment.  (progressing, not met)  5. Pt to be I and compliant with HEP to allow for increased function in affected limb.   (progressing, not met)    Plan   Continue PT  2x   weekly for Complete Decongestive Therapy:  Manual lymphatic drainage, Multilayered short stretch bandaging, Pneumatic compression, Therapeutic exercises, Patient education as deemed necessary to achieve stated goals.      Raeann Rodriguez, PTA

## 2023-11-24 DIAGNOSIS — N18.4 CHRONIC KIDNEY DISEASE, STAGE 4 (SEVERE): Primary | ICD-10-CM

## 2023-11-29 ENCOUNTER — LAB VISIT (OUTPATIENT)
Dept: LAB | Facility: HOSPITAL | Age: 68
End: 2023-11-29
Attending: INTERNAL MEDICINE
Payer: MEDICARE

## 2023-11-29 ENCOUNTER — CLINICAL SUPPORT (OUTPATIENT)
Dept: REHABILITATION | Facility: HOSPITAL | Age: 68
End: 2023-11-29
Payer: MEDICARE

## 2023-11-29 DIAGNOSIS — E11.22 TYPE 2 DIABETES MELLITUS WITH STAGE 3 CHRONIC KIDNEY DISEASE, WITH LONG-TERM CURRENT USE OF INSULIN, UNSPECIFIED WHETHER STAGE 3A OR 3B CKD: ICD-10-CM

## 2023-11-29 DIAGNOSIS — N18.30 TYPE 2 DIABETES MELLITUS WITH STAGE 3 CHRONIC KIDNEY DISEASE, WITH LONG-TERM CURRENT USE OF INSULIN, UNSPECIFIED WHETHER STAGE 3A OR 3B CKD: ICD-10-CM

## 2023-11-29 DIAGNOSIS — I87.2 VENOUS INSUFFICIENCY OF BOTH LOWER EXTREMITIES: Primary | ICD-10-CM

## 2023-11-29 DIAGNOSIS — R31.9 HEMATURIA, UNSPECIFIED TYPE: ICD-10-CM

## 2023-11-29 DIAGNOSIS — N18.4 CHRONIC KIDNEY DISEASE, STAGE 4 (SEVERE): ICD-10-CM

## 2023-11-29 DIAGNOSIS — Z79.4 TYPE 2 DIABETES MELLITUS WITH STAGE 3 CHRONIC KIDNEY DISEASE, WITH LONG-TERM CURRENT USE OF INSULIN, UNSPECIFIED WHETHER STAGE 3A OR 3B CKD: ICD-10-CM

## 2023-11-29 LAB
ANION GAP SERPL CALC-SCNC: 7 MMOL/L (ref 8–16)
BACTERIA #/AREA URNS AUTO: ABNORMAL /HPF
BASOPHILS # BLD AUTO: 0.05 K/UL (ref 0–0.2)
BASOPHILS NFR BLD: 0.9 % (ref 0–1.9)
BILIRUB UR QL STRIP: NEGATIVE
BUN SERPL-MCNC: 34 MG/DL (ref 8–23)
CALCIUM SERPL-MCNC: 7.9 MG/DL (ref 8.7–10.5)
CHLORIDE SERPL-SCNC: 110 MMOL/L (ref 95–110)
CLARITY UR REFRACT.AUTO: CLEAR
CO2 SERPL-SCNC: 23 MMOL/L (ref 23–29)
COLOR UR AUTO: YELLOW
CREAT SERPL-MCNC: 3 MG/DL (ref 0.5–1.4)
CREAT UR-MCNC: 66 MG/DL (ref 23–375)
DIFFERENTIAL METHOD: ABNORMAL
EOSINOPHIL # BLD AUTO: 0.3 K/UL (ref 0–0.5)
EOSINOPHIL NFR BLD: 4.5 % (ref 0–8)
ERYTHROCYTE [DISTWIDTH] IN BLOOD BY AUTOMATED COUNT: 13.7 % (ref 11.5–14.5)
EST. GFR  (NO RACE VARIABLE): 21.8 ML/MIN/1.73 M^2
GLUCOSE SERPL-MCNC: 229 MG/DL (ref 70–110)
GLUCOSE UR QL STRIP: NEGATIVE
HCT VFR BLD AUTO: 35.3 % (ref 40–54)
HGB BLD-MCNC: 11.2 G/DL (ref 14–18)
HGB UR QL STRIP: ABNORMAL
HYALINE CASTS UR QL AUTO: 0 /LPF
IMM GRANULOCYTES # BLD AUTO: 0.01 K/UL (ref 0–0.04)
IMM GRANULOCYTES NFR BLD AUTO: 0.2 % (ref 0–0.5)
KETONES UR QL STRIP: NEGATIVE
LEUKOCYTE ESTERASE UR QL STRIP: NEGATIVE
LYMPHOCYTES # BLD AUTO: 1.9 K/UL (ref 1–4.8)
LYMPHOCYTES NFR BLD: 33.2 % (ref 18–48)
MCH RBC QN AUTO: 29.9 PG (ref 27–31)
MCHC RBC AUTO-ENTMCNC: 31.7 G/DL (ref 32–36)
MCV RBC AUTO: 94 FL (ref 82–98)
MICROSCOPIC COMMENT: ABNORMAL
MONOCYTES # BLD AUTO: 0.5 K/UL (ref 0.3–1)
MONOCYTES NFR BLD: 8.3 % (ref 4–15)
NEUTROPHILS # BLD AUTO: 3.1 K/UL (ref 1.8–7.7)
NEUTROPHILS NFR BLD: 52.9 % (ref 38–73)
NITRITE UR QL STRIP: NEGATIVE
NRBC BLD-RTO: 0 /100 WBC
PH UR STRIP: 6 [PH] (ref 5–8)
PLATELET # BLD AUTO: 152 K/UL (ref 150–450)
PMV BLD AUTO: 11.8 FL (ref 9.2–12.9)
POTASSIUM SERPL-SCNC: 4.9 MMOL/L (ref 3.5–5.1)
PROT UR QL STRIP: ABNORMAL
PROT UR-MCNC: 248 MG/DL (ref 0–15)
PROT/CREAT UR: 3.76 MG/G{CREAT} (ref 0–0.2)
RBC # BLD AUTO: 3.75 M/UL (ref 4.6–6.2)
RBC #/AREA URNS AUTO: >100 /HPF (ref 0–4)
SODIUM SERPL-SCNC: 140 MMOL/L (ref 136–145)
SP GR UR STRIP: 1.01 (ref 1–1.03)
URN SPEC COLLECT METH UR: ABNORMAL
WBC # BLD AUTO: 5.79 K/UL (ref 3.9–12.7)
WBC #/AREA URNS AUTO: 12 /HPF (ref 0–5)

## 2023-11-29 PROCEDURE — 86255 FLUORESCENT ANTIBODY SCREEN: CPT | Performed by: INTERNAL MEDICINE

## 2023-11-29 PROCEDURE — 85025 COMPLETE CBC W/AUTO DIFF WBC: CPT | Performed by: INTERNAL MEDICINE

## 2023-11-29 PROCEDURE — 86255 FLUORESCENT ANTIBODY SCREEN: CPT | Mod: 59 | Performed by: INTERNAL MEDICINE

## 2023-11-29 PROCEDURE — 97140 MANUAL THERAPY 1/> REGIONS: CPT

## 2023-11-29 PROCEDURE — 83520 IMMUNOASSAY QUANT NOS NONAB: CPT | Performed by: INTERNAL MEDICINE

## 2023-11-29 PROCEDURE — 80048 BASIC METABOLIC PNL TOTAL CA: CPT | Performed by: INTERNAL MEDICINE

## 2023-11-29 PROCEDURE — 82570 ASSAY OF URINE CREATININE: CPT | Performed by: INTERNAL MEDICINE

## 2023-11-29 PROCEDURE — 81001 URINALYSIS AUTO W/SCOPE: CPT | Performed by: INTERNAL MEDICINE

## 2023-11-29 PROCEDURE — 36415 COLL VENOUS BLD VENIPUNCTURE: CPT | Mod: PO | Performed by: INTERNAL MEDICINE

## 2023-11-29 NOTE — PROGRESS NOTES
Physical Therapy Daily Treatment Note     Name: Jian JASMINE [facility] Number: [id number]    Therapy Diagnosis:   Encounter Diagnoses   Name Primary?    Venous insufficiency of both lower extremities Yes    Type 2 diabetes mellitus with stage 3 chronic kidney disease, with long-term current use of insulin, unspecified whether stage 3a or 3b CKD        Physician: Savanah Felton DPM    Visit Date: 11/29/2023    Physician: Savanah Felton DPM     Physician Orders: PT Eval and Treat - lymphedema  Medical Diagnosis from Referral: I89.0 (ICD-10-CM) - Lymphedema of both lower extremities    L03.119 (ICD-10-CM) - Cellulitis of lower extremity, unspecified laterality     Evaluation Date: 7/31/2023  Authorization Period Expiration: 12/31/2023  Plan of Care Expiration: 11/30/2023  Visit # / Visits authorized: 17/ 30     Time In:9:00am  Time Out:10:00am  Total Billable Time: 60 minutes      Precautions: Standard and Diabetes    Subjective     Pt reports:he continues to see good results with fluid pills   He was compliant with home exercise program.  Response to previous treatment: good response.  Functional change: none    Pain: 0/10  Location: bilateral lower legs     Objective       Treatment:   Jian received the following manual therapy techniques:- Manual Lymphatic Drainage were applied to the: BLE for 60 minutes, including: MLD and short stretch compression bandaging       MANUAL LYMPHATIC DRAINAGE (MLD):    While supine with LEs elevated stimulation at terminus, along GI region, B inguinal regions, drainage of entire BLE betina lower leg, ankle, and foot with return proximally,  Use of Aquaphor due to dryness.   Educated in self massage to abdominal areas, B inguinal areas, thigh, and remaining LE within reach.      MULTILAYERED BANDAGING: not performed pt left at home and brought velcro wraps to apply       Home Exercises Provided and Patient Education Provided     Self care/home management for 20 minutes :   - Pt  brings compreflex XL velcro wraps   - Donned tubigrip with double fold on foot and ankle . Donned velcro wrap to RLE over tubigrip with education on donning/doffing and proper fit . Pt self applied tubigrip and velcro wrap to LLE with therapist guidance.   - Advised to apply in the morning and can remove before bed .     Education provided:    Remove bandages if painful  PATIENT/FAMILY Education: bandaging wear schedule,  HEP,  Beginning of self massage,  Self or assisted bandaging, compression options, and Risk reduction    Written Home Exercises Provided: Patient instructed to cont prior HEP.  Exercises were reviewed and Jian was able to demonstrate them prior to the end of the session.  Jian demonstrated good  understanding of the education provided.       Assessment     Pt presents with increased swelling secondary to recent ELIEZER . . Pt brought in his velcro wraps today and time spent education and practicing donning/doffing. Encouraged to apply in the morning and remove before bed , remove if any adverse effects .Pt appears to be reacting well to fluid pills  Will continue to progress .     Jian Is progressing well towards his goals.   Pt prognosis is Fair.     Pt will continue to benefit from skilled outpatient physical therapy to address the deficits listed in the problem list box on initial evaluation, provide pt/family education and to maximize pt's level of independence in the home and community environment.   Pt's spiritual, cultural and educational needs considered and pt agreeable to plan of care and goals.     Anticipated barriers to physical therapy: none    Goals:        Short Term Goals: (6 weeks)  1. Patient will show decreased girth in B LE by up to 1 cm to allow for LE symmetry, shoe and clothing choice, and ability to apply needed compression.  (progressing, not met)   2. Patient will demonstrate 100% knowledge of lymphedema precautions and signs of infection to allow for reduced lymphedema risk,  infection risk, and/or exacerbation of condition.  (progressing, not met)  3. Patient or caregiver will perform self-bandaging techniques and/or wearing of compression garments to allow for lymphatic drainage support, skin elasticity, and reduction in shape and size of limb. (progressing, not met)  4. Patient will perform self lymph drainage techniques to areas within reach to enhance lymphatic drainage and skin condition.  (progressing, not met)  5. Patient will tolerate daily activities with multilayered bandaging to allow for lymphatic and venous support.  (progressing, not met)     Long Term Goals: (12  weeks)  1. Patient will show decreased girth in B LE by up to 2 cm  to allow for LE symmetry, shoe and clothing choice, and ability to apply needed compression daily.  (progressing, not met)  2. Patient will show reduction in density to mild or less with improved contour of limb to allow for cosmesis, LE symmetry, infection risk reduction, and clothing and compression choice.   (progressing, not met)  3. Patient to krystal/doff compression garment with daily compliance to assist in lymphedema management, skin elasticity, and tissue density.  (progressing, not met)  4. Pt to show improved postural awareness and alignment.  (progressing, not met)  5. Pt to be I and compliant with HEP to allow for increased function in affected limb.   (progressing, not met)    Plan   Continue PT  2x   weekly for Complete Decongestive Therapy:  Manual lymphatic drainage, Multilayered short stretch bandaging, Pneumatic compression, Therapeutic exercises, Patient education as deemed necessary to achieve stated goals.      Luisa Thompson, PT

## 2023-12-01 LAB
PHOSPHOLIPASE A2 RECEPTOR, ELISA: <2 RU/ML
PLA2R IGG SERPL QL IF: NEGATIVE
THSD7A IGG SERPL QL IF: NEGATIVE

## 2023-12-04 ENCOUNTER — HOSPITAL ENCOUNTER (OUTPATIENT)
Dept: RADIOLOGY | Facility: HOSPITAL | Age: 68
Discharge: HOME | End: 2023-12-04
Attending: INTERNAL MEDICINE
Payer: MEDICARE

## 2023-12-04 DIAGNOSIS — N18.4 CHRONIC KIDNEY DISEASE, STAGE 4 (SEVERE): ICD-10-CM

## 2023-12-04 PROCEDURE — 76770 US RETROPERITONEAL COMPLETE: ICD-10-PCS | Mod: 26,,, | Performed by: RADIOLOGY

## 2023-12-04 PROCEDURE — 76770 US EXAM ABDO BACK WALL COMP: CPT | Mod: TC

## 2023-12-04 PROCEDURE — 76770 US EXAM ABDO BACK WALL COMP: CPT | Mod: 26,,, | Performed by: RADIOLOGY

## 2023-12-05 ENCOUNTER — CLINICAL SUPPORT (OUTPATIENT)
Dept: REHABILITATION | Facility: HOSPITAL | Age: 68
End: 2023-12-05
Payer: MEDICARE

## 2023-12-05 ENCOUNTER — TELEPHONE (OUTPATIENT)
Dept: UROLOGY | Facility: CLINIC | Age: 68
End: 2023-12-05
Payer: MEDICARE

## 2023-12-05 DIAGNOSIS — E11.22 TYPE 2 DIABETES MELLITUS WITH STAGE 3 CHRONIC KIDNEY DISEASE, WITH LONG-TERM CURRENT USE OF INSULIN, UNSPECIFIED WHETHER STAGE 3A OR 3B CKD: ICD-10-CM

## 2023-12-05 DIAGNOSIS — I87.2 VENOUS INSUFFICIENCY OF BOTH LOWER EXTREMITIES: Primary | ICD-10-CM

## 2023-12-05 DIAGNOSIS — Z79.4 TYPE 2 DIABETES MELLITUS WITH STAGE 3 CHRONIC KIDNEY DISEASE, WITH LONG-TERM CURRENT USE OF INSULIN, UNSPECIFIED WHETHER STAGE 3A OR 3B CKD: ICD-10-CM

## 2023-12-05 DIAGNOSIS — N18.30 TYPE 2 DIABETES MELLITUS WITH STAGE 3 CHRONIC KIDNEY DISEASE, WITH LONG-TERM CURRENT USE OF INSULIN, UNSPECIFIED WHETHER STAGE 3A OR 3B CKD: ICD-10-CM

## 2023-12-05 PROCEDURE — 97140 MANUAL THERAPY 1/> REGIONS: CPT

## 2023-12-05 NOTE — TELEPHONE ENCOUNTER
Called pt to confirm arrival time of 5:45am for procedure on 12/6/23. Gave pt NPO instructions and gave pt opportunity to ask questions. Pt verbalized understanding.

## 2023-12-06 ENCOUNTER — HOSPITAL ENCOUNTER (OUTPATIENT)
Facility: HOSPITAL | Age: 68
Discharge: HOME | End: 2023-12-06
Attending: UROLOGY | Admitting: UROLOGY
Payer: MEDICARE

## 2023-12-06 ENCOUNTER — ANESTHESIA EVENT (OUTPATIENT)
Dept: SURGERY | Facility: HOSPITAL | Age: 68
End: 2023-12-06
Payer: MEDICARE

## 2023-12-06 ENCOUNTER — PATIENT MESSAGE (OUTPATIENT)
Dept: NEPHROLOGY | Facility: CLINIC | Age: 68
End: 2023-12-06
Payer: MEDICARE

## 2023-12-06 ENCOUNTER — ANESTHESIA (OUTPATIENT)
Dept: SURGERY | Facility: HOSPITAL | Age: 68
End: 2023-12-06
Payer: MEDICARE

## 2023-12-06 VITALS
HEIGHT: 67 IN | WEIGHT: 294.56 LBS | BODY MASS INDEX: 46.23 KG/M2 | SYSTOLIC BLOOD PRESSURE: 140 MMHG | HEART RATE: 81 BPM | OXYGEN SATURATION: 97 % | TEMPERATURE: 98 F | DIASTOLIC BLOOD PRESSURE: 67 MMHG | RESPIRATION RATE: 14 BRPM

## 2023-12-06 DIAGNOSIS — R07.9 CHEST PAIN: ICD-10-CM

## 2023-12-06 DIAGNOSIS — N20.1 URETERAL STONE: Primary | ICD-10-CM

## 2023-12-06 LAB
POCT GLUCOSE: 118 MG/DL (ref 70–110)
POCT GLUCOSE: 94 MG/DL (ref 70–110)

## 2023-12-06 PROCEDURE — 82962 GLUCOSE BLOOD TEST: CPT | Performed by: UROLOGY

## 2023-12-06 PROCEDURE — 25000242 PHARM REV CODE 250 ALT 637 W/ HCPCS: Performed by: ANESTHESIOLOGY

## 2023-12-06 PROCEDURE — D9220A PRA ANESTHESIA: Mod: CRNA,,, | Performed by: NURSE ANESTHETIST, CERTIFIED REGISTERED

## 2023-12-06 PROCEDURE — D9220A PRA ANESTHESIA: Mod: ANES,,, | Performed by: ANESTHESIOLOGY

## 2023-12-06 PROCEDURE — 63600175 PHARM REV CODE 636 W HCPCS: Performed by: ANESTHESIOLOGY

## 2023-12-06 PROCEDURE — 71000015 HC POSTOP RECOV 1ST HR: Performed by: UROLOGY

## 2023-12-06 PROCEDURE — 74420 UROGRAPHY RTRGR +-KUB: CPT | Mod: 26,,, | Performed by: UROLOGY

## 2023-12-06 PROCEDURE — 63600175 PHARM REV CODE 636 W HCPCS

## 2023-12-06 PROCEDURE — 52352 PR CYSTO/URETERO/PYELOSCOPY, CALCULUS TX: ICD-10-PCS | Mod: RT,,, | Performed by: UROLOGY

## 2023-12-06 PROCEDURE — 37000009 HC ANESTHESIA EA ADD 15 MINS: Performed by: UROLOGY

## 2023-12-06 PROCEDURE — C1769 GUIDE WIRE: HCPCS | Performed by: UROLOGY

## 2023-12-06 PROCEDURE — 25000003 PHARM REV CODE 250: Performed by: NURSE ANESTHETIST, CERTIFIED REGISTERED

## 2023-12-06 PROCEDURE — 37000008 HC ANESTHESIA 1ST 15 MINUTES: Performed by: UROLOGY

## 2023-12-06 PROCEDURE — 25500020 PHARM REV CODE 255: Performed by: UROLOGY

## 2023-12-06 PROCEDURE — 93010 ELECTROCARDIOGRAM REPORT: CPT | Mod: ,,, | Performed by: INTERNAL MEDICINE

## 2023-12-06 PROCEDURE — 93010 EKG 12-LEAD: ICD-10-PCS | Mod: ,,, | Performed by: INTERNAL MEDICINE

## 2023-12-06 PROCEDURE — 25000003 PHARM REV CODE 250

## 2023-12-06 PROCEDURE — 82365 CALCULUS SPECTROSCOPY: CPT | Performed by: UROLOGY

## 2023-12-06 PROCEDURE — D9220A PRA ANESTHESIA: ICD-10-PCS | Mod: ANES,,, | Performed by: ANESTHESIOLOGY

## 2023-12-06 PROCEDURE — 27201423 OPTIME MED/SURG SUP & DEVICES STERILE SUPPLY: Performed by: UROLOGY

## 2023-12-06 PROCEDURE — 36000707: Performed by: UROLOGY

## 2023-12-06 PROCEDURE — 93005 ELECTROCARDIOGRAM TRACING: CPT | Mod: 59

## 2023-12-06 PROCEDURE — D9220A PRA ANESTHESIA: ICD-10-PCS | Mod: CRNA,,, | Performed by: NURSE ANESTHETIST, CERTIFIED REGISTERED

## 2023-12-06 PROCEDURE — 52352 CYSTOURETERO W/STONE REMOVE: CPT | Mod: RT,,, | Performed by: UROLOGY

## 2023-12-06 PROCEDURE — C1894 INTRO/SHEATH, NON-LASER: HCPCS | Performed by: UROLOGY

## 2023-12-06 PROCEDURE — 74420 PR  X-RAY RETROGRADE PYELOGRAM: ICD-10-PCS | Mod: 26,,, | Performed by: UROLOGY

## 2023-12-06 PROCEDURE — 36000706: Performed by: UROLOGY

## 2023-12-06 PROCEDURE — 71000044 HC DOSC ROUTINE RECOVERY FIRST HOUR: Performed by: UROLOGY

## 2023-12-06 PROCEDURE — 63600175 PHARM REV CODE 636 W HCPCS: Performed by: NURSE ANESTHETIST, CERTIFIED REGISTERED

## 2023-12-06 RX ORDER — SUCCINYLCHOLINE CHLORIDE 20 MG/ML
INJECTION INTRAMUSCULAR; INTRAVENOUS
Status: DISCONTINUED | OUTPATIENT
Start: 2023-12-06 | End: 2023-12-06

## 2023-12-06 RX ORDER — PROPOFOL 10 MG/ML
VIAL (ML) INTRAVENOUS
Status: DISCONTINUED | OUTPATIENT
Start: 2023-12-06 | End: 2023-12-06

## 2023-12-06 RX ORDER — FENTANYL CITRATE 50 UG/ML
25 INJECTION, SOLUTION INTRAMUSCULAR; INTRAVENOUS EVERY 5 MIN PRN
Status: DISCONTINUED | OUTPATIENT
Start: 2023-12-06 | End: 2023-12-06 | Stop reason: HOSPADM

## 2023-12-06 RX ORDER — HALOPERIDOL 5 MG/ML
0.5 INJECTION INTRAMUSCULAR EVERY 10 MIN PRN
Status: DISCONTINUED | OUTPATIENT
Start: 2023-12-06 | End: 2023-12-06 | Stop reason: HOSPADM

## 2023-12-06 RX ORDER — MIDAZOLAM HYDROCHLORIDE 1 MG/ML
INJECTION, SOLUTION INTRAMUSCULAR; INTRAVENOUS
Status: DISCONTINUED | OUTPATIENT
Start: 2023-12-06 | End: 2023-12-06

## 2023-12-06 RX ORDER — FENTANYL CITRATE 50 UG/ML
INJECTION, SOLUTION INTRAMUSCULAR; INTRAVENOUS
Status: DISCONTINUED | OUTPATIENT
Start: 2023-12-06 | End: 2023-12-06

## 2023-12-06 RX ORDER — OXYBUTYNIN CHLORIDE 5 MG/1
5 TABLET ORAL 3 TIMES DAILY PRN
Qty: 30 TABLET | Refills: 0 | Status: SHIPPED | OUTPATIENT
Start: 2023-12-06 | End: 2024-03-08

## 2023-12-06 RX ORDER — TAMSULOSIN HYDROCHLORIDE 0.4 MG/1
0.4 CAPSULE ORAL DAILY
Qty: 30 CAPSULE | Refills: 0 | Status: SHIPPED | OUTPATIENT
Start: 2023-12-06 | End: 2024-03-08

## 2023-12-06 RX ORDER — ONDANSETRON 2 MG/ML
INJECTION INTRAMUSCULAR; INTRAVENOUS
Status: DISCONTINUED | OUTPATIENT
Start: 2023-12-06 | End: 2023-12-06

## 2023-12-06 RX ORDER — NITROGLYCERIN 0.4 MG/1
0.4 TABLET SUBLINGUAL EVERY 5 MIN PRN
Status: DISCONTINUED | OUTPATIENT
Start: 2023-12-06 | End: 2023-12-06 | Stop reason: HOSPADM

## 2023-12-06 RX ORDER — ROCURONIUM BROMIDE 10 MG/ML
INJECTION, SOLUTION INTRAVENOUS
Status: DISCONTINUED | OUTPATIENT
Start: 2023-12-06 | End: 2023-12-06

## 2023-12-06 RX ORDER — PHENYLEPHRINE HCL IN 0.9% NACL 1 MG/10 ML
SYRINGE (ML) INTRAVENOUS
Status: DISCONTINUED | OUTPATIENT
Start: 2023-12-06 | End: 2023-12-06

## 2023-12-06 RX ORDER — POTASSIUM CITRATE 10 MEQ/1
10 TABLET, EXTENDED RELEASE ORAL
Qty: 270 TABLET | Refills: 3 | Status: SHIPPED | OUTPATIENT
Start: 2023-12-06 | End: 2024-01-04 | Stop reason: SDUPTHER

## 2023-12-06 RX ORDER — SODIUM CHLORIDE 0.9 % (FLUSH) 0.9 %
3 SYRINGE (ML) INJECTION
Status: DISCONTINUED | OUTPATIENT
Start: 2023-12-06 | End: 2023-12-06 | Stop reason: HOSPADM

## 2023-12-06 RX ORDER — LIDOCAINE HYDROCHLORIDE 20 MG/ML
INJECTION, SOLUTION EPIDURAL; INFILTRATION; INTRACAUDAL; PERINEURAL
Status: DISCONTINUED | OUTPATIENT
Start: 2023-12-06 | End: 2023-12-06

## 2023-12-06 RX ADMIN — SUGAMMADEX 200 MG: 100 INJECTION, SOLUTION INTRAVENOUS at 09:12

## 2023-12-06 RX ADMIN — LIDOCAINE HYDROCHLORIDE 100 MG: 20 INJECTION, SOLUTION EPIDURAL; INFILTRATION; INTRACAUDAL at 08:12

## 2023-12-06 RX ADMIN — MIDAZOLAM 1 MG: 1 INJECTION INTRAMUSCULAR; INTRAVENOUS at 07:12

## 2023-12-06 RX ADMIN — NITROGLYCERIN 0.4 MG: 0.4 TABLET, ORALLY DISINTEGRATING SUBLINGUAL at 10:12

## 2023-12-06 RX ADMIN — PROPOFOL 120 MG: 10 INJECTION, EMULSION INTRAVENOUS at 08:12

## 2023-12-06 RX ADMIN — SUCCINYLCHOLINE CHLORIDE 120 MG: 20 INJECTION, SOLUTION INTRAMUSCULAR; INTRAVENOUS at 08:12

## 2023-12-06 RX ADMIN — PROPOFOL 30 MG: 10 INJECTION, EMULSION INTRAVENOUS at 08:12

## 2023-12-06 RX ADMIN — SODIUM CHLORIDE: 0.9 INJECTION, SOLUTION INTRAVENOUS at 07:12

## 2023-12-06 RX ADMIN — FENTANYL CITRATE 100 MCG: 50 INJECTION INTRAMUSCULAR; INTRAVENOUS at 08:12

## 2023-12-06 RX ADMIN — ROCURONIUM BROMIDE 20 MG: 10 INJECTION INTRAVENOUS at 08:12

## 2023-12-06 RX ADMIN — ROCURONIUM BROMIDE 30 MG: 10 INJECTION INTRAVENOUS at 08:12

## 2023-12-06 RX ADMIN — CEFAZOLIN 2 G: 2 INJECTION, POWDER, FOR SOLUTION INTRAMUSCULAR; INTRAVENOUS at 08:12

## 2023-12-06 RX ADMIN — GLYCOPYRROLATE 0.2 MG: 0.2 INJECTION INTRAMUSCULAR; INTRAVENOUS at 08:12

## 2023-12-06 RX ADMIN — MIDAZOLAM 1 MG: 1 INJECTION INTRAMUSCULAR; INTRAVENOUS at 08:12

## 2023-12-06 RX ADMIN — Medication 100 MCG: at 08:12

## 2023-12-06 RX ADMIN — FENTANYL CITRATE 25 MCG: 50 INJECTION INTRAMUSCULAR; INTRAVENOUS at 09:12

## 2023-12-06 RX ADMIN — ONDANSETRON 4 MG: 2 INJECTION INTRAMUSCULAR; INTRAVENOUS at 08:12

## 2023-12-06 NOTE — SUBJECTIVE & OBJECTIVE
Past Medical History:   Diagnosis Date    Alcohol abuse     Anasarca 1/28/2019    Anemia     Anticoagulant long-term use     Arthropathy associated with neurological disorder 9/2/2015    Atherosclerosis     Charcot foot due to diabetes mellitus     Chronic combined systolic and diastolic heart failure 01/29/2019 1-28-19 Left VentricleModerate decreased ejection fraction at 30%. Normal left ventricular cavity size. Normal wall thickness observed. Grade I (mild) left ventricular diastolic dysfunction consistent with impaired relaxation. Normal left atrial pressure. Moderate, global hypokinesis(see wall scoring diagram). Right VentricleNormal cavity size, wall thickness and ejection fraction. Wall motion n    Chronic pancreatitis 1/28/2019    CKD (chronic kidney disease) stage 3, GFR 30-59 ml/min     CKD (chronic kidney disease) stage 4, GFR 15-29 ml/min     Colon polyps     approx 5 yrs ago    Coronary artery disease due to calcified coronary lesion 05/08/2015    5 stents on ASA      Diabetic polyneuropathy associated with type 2 diabetes mellitus 9/2/2015    Diverticulosis 1/28/2019    DM type 2 with diabetic peripheral neuropathy 2/4/2019    Encounter for blood transfusion     Essential hypertension 1/28/2019    Former smoker 8/26/2015    Healed ulcer of left foot on examination 6/20/2017    Hematuria     Hydrocele     approx 1.5 yrs ago    Hyperphosphatemia     Hypoalbuminemia 2/4/2019    Hypocalcemia     Lymphedema of both lower extremities 1/29/2019    Mixed hyperlipidemia 5/8/2015    Morbid obesity with BMI of 50.0-59.9, adult 5/8/2015    Obstruction of right ureteropelvic junction (UPJ) due to stone 5/24/2021    Onychomycosis of multiple toenails with type 2 diabetes mellitus and peripheral neuropathy 6/20/2017    Perianal cyst     approx 2 yrs ago    Proteinuria     Pseudocyst of pancreas 1/28/2019 1-28-19 Liver has a cirrhotic morphology with no focal lesions.  Significant interval increase in ascites  when compared to prior exam which may account for patient's abdominal distension.  Hypodense air-fluid collection along the body of the pancreas which is slightly smaller when compared to prior CT.  Findings may relate to pancreatic necrosis with pancreatic pseudocysts with infected pseudocyst    Skin cancer     skin cancer    Sleep apnea 8/26/2015    Status post bariatric surgery 1/11/2016    Type 2 diabetes mellitus, with long-term current use of insulin 5/8/2015    Urinary tract infection        Past Surgical History:   Procedure Laterality Date    ANGIOGRAPHY N/A 6/28/2019    Procedure: ANGIOGRAM-PV STENT;  Surgeon: Ewa Diagnostic Provider;  Location: [facility] OR;  Service: Radiology;  Laterality: N/A;    ANGIOPLASTY      total x5 stents    COLONOSCOPY N/A 10/6/2015    Procedure: COLONOSCOPY;  Surgeon: Shekhar Richards MD;  Location: [facility] (2ND FLR);  Service: Endoscopy;  Laterality: N/A;  BMI over 55/2nd floor case    5 day hold Plavix, Dr Kwadwo Arroyo    COLONOSCOPY N/A 5/13/2021    Procedure: COLONOSCOPY;  Surgeon: Huan Brumfield MD;  Location: [facility] ENDO;  Service: Endoscopy;  Laterality: N/A;    CORONARY ANGIOGRAPHY Right 3/20/2019    Procedure: ANGIOGRAM, CORONARY ARTERY;  Surgeon: Bob Duque MD;  Location: [facility] CATH LAB;  Service: Cardiology;  Laterality: Right;    CORONARY ARTERY BYPASS GRAFT  2017    x3    CORONARY BYPASS GRAFT ANGIOGRAPHY  3/20/2019    Procedure: Bypass graft study;  Surgeon: Bob Duque MD;  Location: [facility] CATH LAB;  Service: Cardiology;;    CYST REMOVAL      CYSTOSCOPY Right 6/30/2021    Procedure: CYSTOSCOPY;  Surgeon: William Diaz MD;  [address]  Service: Urology;  Laterality: Right;    CYSTOSCOPY N/A 10/16/2023    Procedure: CYSTOSCOPY;  Surgeon: Chrystal Dias MD;  [address]  Service: Urology;  Laterality: N/A;    CYSTOSCOPY W/ URETERAL STENT PLACEMENT Right 6/16/2021    Procedure: CYSTOSCOPY, WITH URETERAL STENT  INSERTION;  Surgeon: William Diaz MD;  [facility] OR 2ND FLR;  Service: Urology;  Laterality: Right;  FLUORO LESS THAN 1 HOUR    ENDOSCOPIC ULTRASOUND OF UPPER GASTROINTESTINAL TRACT N/A 2/26/2020    Procedure: ULTRASOUND, UPPER GI TRACT, ENDOSCOPIC;  Surgeon: Robbie Yang MD;  Location: [facility] ENDO;  Service: Endoscopy;  Laterality: N/A;    ESOPHAGOGASTRODUODENOSCOPY N/A 7/8/2019    Procedure: ESOPHAGOGASTRODUODENOSCOPY (EGD);  Surgeon: Huan Brumfield MD;  Location: [facility] ENDO;  Service: Endoscopy;  Laterality: N/A;    ESOPHAGOGASTRODUODENOSCOPY N/A 5/13/2021    Procedure: EGD (ESOPHAGOGASTRODUODENOSCOPY);  Surgeon: Huan Brumfield MD;  Location: [facility] ENDO;  Service: Endoscopy;  Laterality: N/A;    EXCISION OF HYDROCELE Bilateral 6/16/2021    Procedure: HYDROCELE REPAIR;  Surgeon: William Diaz MD;  Location: [facility] OR 2ND FLR;  Service: Urology;  Laterality: Bilateral;  2 HOURS    FLUOROSCOPY N/A 6/16/2021    Procedure: FLUOROSCOPY;  Surgeon: William Diaz MD;  Location: [facility] OR 2ND FLR;  Service: Urology;  Laterality: N/A;    GASTRECTOMY      INSERTION OF DIALYSIS CATHETER N/A 5/17/2019    Procedure: pleurx;  Surgeon: Ewa Diagnostic Provider;  Location: [facility] OR 2ND FLR;  Service: General;  Laterality: N/A;  Room [facility]    KNEE ARTHROSCOPY      LAPAROSCOPIC CHOLECYSTECTOMY N/A 5/4/2020    Procedure: CHOLECYSTECTOMY, LAPAROSCOPIC;  Surgeon: SON Rowe MD;  Location: [facility] OR;  Service: General;  Laterality: N/A;    LASER LITHOTRIPSY N/A 6/30/2021    Procedure: LITHOTRIPSY, USING LASER;  Surgeon: William Diaz MD;  Location: [facility] OR 1ST FLR;  Service: Urology;  Laterality: N/A;    LIVER BIOPSY N/A 5/4/2020    Procedure: BIOPSY, LIVER, Laproscopic ;  Surgeon: SON Rowe MD;  Location: [facility] OR;  Service: General;  Laterality: N/A;    perianal surgery      perianal cyst removed    PYELOSCOPY Right 6/30/2021    Procedure: PYELOSCOPY;  Surgeon: William Diaz MD;  Location: [address]  FLR;  Service: Urology;  Laterality: Right;    PYELOSCOPY Right 10/16/2023    Procedure: PYELOSCOPY;  Surgeon: Chrystal Dias MD;  [facility]: [facility] OR 1ST FLR;  Service: Urology;  Laterality: Right;    REPLACEMENT OF STENT Right 2021    Procedure: REPLACEMENT, STENT;  Surgeon: William Diaz MD;  Location: [facility] OR 1ST FLR;  Service: Urology;  Laterality: Right;    RETROGRADE PYELOGRAPHY Right 10/16/2023    Procedure: PYELOGRAM, RETROGRADE;  Surgeon: Chrystal Dias MD;  Location: [facility] OR 1ST FLR;  Service: Urology;  Laterality: Right;    URETERAL STENT PLACEMENT Right 10/16/2023    Procedure: INSERTION, STENT, URETER;  Surgeon: Chrystal Dias MD;  Location: [facility] OR 1ST FLR;  Service: Urology;  Laterality: Right;    URETEROSCOPY Right 2021    Procedure: URETEROSCOPY FLEXIBLE URETEROSCOPE;  Surgeon: William Diaz MD;  Location: [facility] OR [facility] FLR;  Service: Urology;  Laterality: Right;    URETEROSCOPY Right 10/16/2023    Procedure: URETEROSCOPY;  Surgeon: Chrystal Dias MD;  Location: [facility] OR [facility] FLR;  Service: Urology;  Laterality: Right;       Review of patient's allergies indicates:  No Known Allergies    Family History       Problem Relation (Age of Onset)    Cancer Mother, Father, Paternal Grandfather, Brother    Diabetes Maternal Grandmother    Heart disease Father    No Known Problems Paternal Grandmother    Obesity Sister    Parkinsonism Brother    Stroke Maternal Grandfather            Tobacco Use    Smoking status: Former     Current packs/day: 0.00     Average packs/day: 2.0 packs/day for 30.0 years (60.0 ttl pk-yrs)     Types: Cigarettes     Start date: 1975     Quit date: 2005     Years since quittin.8    Smokeless tobacco: Never   Substance and Sexual Activity    Alcohol use: No     Comment: started ~, reports 1 shot daily, max 3 shots daily, vague about alcohol consumption. Last drink 2018    Drug use: No    Sexual activity: Not on file        Review of Systems    Objective:     Temp:  [98.1 °F (36.7 °C)] 98.1 °F (36.7 °C)  Pulse:  [78] 78  Resp:  [20] 20  SpO2:  [100 %] 100 %  BP: (174)/(82) 174/82     Body mass index is 46.13 kg/m².    No intake/output data recorded.       Drains       Drain  Duration                  Ureteral Drain/Stent 06/16/21 0912 Right ureter 24 Fr. 902 days                      Physical Exam  Constitutional:       Appearance: He is obese.   Pulmonary:      Effort: Pulmonary effort is normal.   Musculoskeletal:         General: Swelling present.   Neurological:      General: No focal deficit present.          Significant Labs:    BMP:  Recent Labs   Lab 11/29/23  0756      K 4.9      CO2 23   BUN 34*   CREATININE 3.0*   CALCIUM 7.9*       CBC:  Recent Labs   Lab 11/29/23  0756   WBC 5.79   HGB 11.2*   HCT 35.3*          All pertinent labs results from the past 24 hours have been reviewed.    Significant Imaging:  All pertinent imaging results/findings from the past 24 hours have been reviewed.

## 2023-12-06 NOTE — ANESTHESIA PROCEDURE NOTES
Intubation    Date/Time: 12/6/2023 8:13 AM    Performed by: Verónica Ledezma CRNA  Authorized by: Raimundo Jimenez MD    Intubation:     Induction:  Intravenous    Intubated:  Postinduction    Mask Ventilation:  Moderately difficult with oral airway (two hand mask with oral aw)    Attempts:  1    Attempted By:  CRNA    Method of Intubation:  Direct    Blade:  M[place]uai 2    Laryngeal View Grade: Grade I - full view of cords      Difficult Airway Encountered?: No      Complications:  None    Airway Device:  Oral endotracheal tube    Airway Device Size:  7.5    Style/Cuff Inflation:  Cuffed    Inflation Amount (mL):  7    Tube secured:  21    Secured at:  The lips    Placement Verified By:  Capnometry    Complicating Factors:  None, obesity and short neck    Findings Post-Intubation:  BS equal bilateral and atraumatic/condition of teeth unchanged

## 2023-12-06 NOTE — CARE UPDATE
Brief Cardiology Update Note    Patient with chest discomfort post op from Cystoscopy and stone extraction with Urology. Call from Anesthesia team due to concerns of chest discomfort. Fortunately resolved with nitroglycerin tab 0.4 mg SL x 1. ECG showed non specific IVCD with normal sinus rhythm, no ischemic changes noted, independently reviewed. TTE from 10/2023 showed preserved LVEF.   Would recommend follow up with outpatient [facility] to discuss utility of metoprolol tartrate as long term antianginal therapy +/- SL nitro PRN at home.    Thank you for your question. Please call with questions or concerns.     Mike Hassan MD  Cardiology PGY6  [facility]

## 2023-12-06 NOTE — ASSESSMENT & PLAN NOTE
- OR today for R URS, LL and stone extraction   - urine dip negative for all components   - consent obtained, all questions answered

## 2023-12-06 NOTE — PLAN OF CARE
Anesthesiology Staff Update:    After postprocedural recovery, patient endorsed substernal chest pain with radiation to the back. No SOB or diaphoresis noted. Mildly hypertensive and tachycardia, did not take home BP meds this morning. EKG obtained showing no significant change from prior EKG asides from non-specific ST changes in V1 and V2. Chest pain subsided after one dose of SL NTG. Discussed with cardiology on-call. Given minimal ST changes and stable nature of angina responding to NTG, fit for outpatient follow-up. Strict return precautions given for symptoms progression and chest pain not responding to NTG. Patient given cardiology clinic number to establish follow up ASAP. New vial of SL NTG tabs given to patient as he was unsure if his current supply had .    Raimundo Jimenez MD

## 2023-12-06 NOTE — H&P
[facility] ([facility])  Urology  History & Physical    Patient Name: Jian Arrieta  MRN: [id number]  Admission Date: 12/6/2023  Code Status: Prior   Attending Provider: William Diaz MD   Primary Care Physician: Leon Barajas DO  Principal Problem:<principal problem not specified>    Subjective:     HPI:  68M with hx of nephrolithiasis s/p R ureteral stent placement and pyeloscopy on 10/16 presents for R ureteroscopy, LL, and stone extraction.     Urine dip negative for all components.      Past Medical History:   Diagnosis Date    Alcohol abuse     Anasarca 1/28/2019    Anemia     Anticoagulant long-term use     Arthropathy associated with neurological disorder 9/2/2015    Atherosclerosis     Charcot foot due to diabetes mellitus     Chronic combined systolic and diastolic heart failure 01/29/2019 1-28-19 Left VentricleModerate decreased ejection fraction at 30%. Normal left ventricular cavity size. Normal wall thickness observed. Grade I (mild) left ventricular diastolic dysfunction consistent with impaired relaxation. Normal left atrial pressure. Moderate, global hypokinesis(see wall scoring diagram). Right VentricleNormal cavity size, wall thickness and ejection fraction. Wall motion n    Chronic pancreatitis 1/28/2019    CKD (chronic kidney disease) stage 3, GFR 30-59 ml/min     CKD (chronic kidney disease) stage 4, GFR 15-29 ml/min     Colon polyps     approx 5 yrs ago    Coronary artery disease due to calcified coronary lesion 05/08/2015    5 stents on ASA      Diabetic polyneuropathy associated with type 2 diabetes mellitus 9/2/2015    Diverticulosis 1/28/2019    DM type 2 with diabetic peripheral neuropathy 2/4/2019    Encounter for blood transfusion     Essential hypertension 1/28/2019    Former smoker 8/26/2015    Healed ulcer of left foot on examination 6/20/2017    Hematuria     Hydrocele     approx 1.5 yrs ago    Hyperphosphatemia     Hypoalbuminemia 2/4/2019    Hypocalcemia      Lymphedema of both lower extremities 1/29/2019    Mixed hyperlipidemia 5/8/2015    Morbid obesity with BMI of 50.0-59.9, adult 5/8/2015    Obstruction of right ureteropelvic junction (UPJ) due to stone 5/24/2021    Onychomycosis of multiple toenails with type 2 diabetes mellitus and peripheral neuropathy 6/20/2017    Perianal cyst     approx 2 yrs ago    Proteinuria     Pseudocyst of pancreas 1/28/2019 1-28-19 Liver has a cirrhotic morphology with no focal lesions.  Significant interval increase in ascites when compared to prior exam which may account for patient's abdominal distension.  Hypodense air-fluid collection along the body of the pancreas which is slightly smaller when compared to prior CT.  Findings may relate to pancreatic necrosis with pancreatic pseudocysts with infected pseudocyst    Skin cancer     skin cancer    Sleep apnea 8/26/2015    Status post bariatric surgery 1/11/2016    Type 2 diabetes mellitus, with long-term current use of insulin 5/8/2015    Urinary tract infection        Past Surgical History:   Procedure Laterality Date    ANGIOGRAPHY N/A 6/28/2019    Procedure: ANGIOGRAM-PV STENT;  Surgeon: Ewa Diagnostic Provider;  Location: [facility] OR;  Service: Radiology;  Laterality: N/A;    ANGIOPLASTY      total x5 stents    COLONOSCOPY N/A 10/6/2015    Procedure: COLONOSCOPY;  Surgeon: Shekhar Richards MD;  Location: [facility] ENDO ([address] [place] [address]);  Service: Endoscopy;  Laterality: N/A;  BMI over 55/2nd floor case    5 day hold Plavix, Dr Kwadwo Arroyo    COLONOSCOPY N/A 5/13/2021    Procedure: COLONOSCOPY;  Surgeon: Huan Brumfield MD;  Location: [facility] ENDO;  Service: Endoscopy;  Laterality: N/A;    CORONARY ANGIOGRAPHY Right 3/20/2019    Procedure: ANGIOGRAM, CORONARY ARTERY;  Surgeon: Bob Duque MD;  Location: [facility] CATH LAB;  Service: Cardiology;  Laterality: Right;    CORONARY ARTERY BYPASS GRAFT  2017    x3    CORONARY BYPASS GRAFT ANGIOGRAPHY  3/20/2019    Procedure: Bypass graft study;   Surgeon: Bob Duque MD;  Location: [facility] CATH LAB;  Service: Cardiology;;    CYST REMOVAL      CYSTOSCOPY Right 6/30/2021    Procedure: CYSTOSCOPY;  Surgeon: William Diaz MD;  Location: [facility] OR 1ST FLR;  Service: Urology;  Laterality: Right;    CYSTOSCOPY N/A 10/16/2023    Procedure: CYSTOSCOPY;  Surgeon: Chrystal Dias MD;  Location: [facility] OR 1ST FLR;  Service: Urology;  Laterality: N/A;    CYSTOSCOPY W/ URETERAL STENT PLACEMENT Right 6/16/2021    Procedure: CYSTOSCOPY, WITH URETERAL STENT INSERTION;  Surgeon: William Diaz MD;  Location: [facility] OR 2ND FLR;  Service: Urology;  Laterality: Right;  FLUORO LESS THAN 1 HOUR    ENDOSCOPIC ULTRASOUND OF UPPER GASTROINTESTINAL TRACT N/A 2/26/2020    Procedure: ULTRASOUND, UPPER GI TRACT, ENDOSCOPIC;  Surgeon: Robbie Yang MD;  Location: [facility];  Service: Endoscopy;  Laterality: N/A;    ESOPHAGOGASTRODUODENOSCOPY N/A 7/8/2019    Procedure: ESOPHAGOGASTRODUODENOSCOPY (EGD);  Surgeon: Huan Brumfield MD;  Location: [facility];  Service: Endoscopy;  Laterality: N/A;    ESOPHAGOGASTRODUODENOSCOPY N/A 5/13/2021    Procedure: EGD (ESOPHAGOGASTRODUODENOSCOPY);  Surgeon: Huan Brumfield MD;  Location: [facility];  Service: Endoscopy;  Laterality: N/A;    EXCISION OF HYDROCELE Bilateral 6/16/2021    Procedure: HYDROCELE REPAIR;  Surgeon: William Diaz MD;  Location: [facility] OR 2ND FLR;  Service: Urology;  Laterality: Bilateral;  2 HOURS    FLUOROSCOPY N/A 6/16/2021    Procedure: FLUOROSCOPY;  Surgeon: William Diaz MD;  Location: [facility] OR 2ND FLR;  Service: Urology;  Laterality: N/A;    GASTRECTOMY      INSERTION OF DIALYSIS CATHETER N/A 5/17/2019    Procedure: pleurx;  Surgeon: [facility] Diagnostic Provider;  [facility];  Service: General;  Laterality: N/A;  Room [address]/[place]    KNEE ARTHROSCOPY      LAPAROSCOPIC CHOLECYSTECTOMY N/A 5/4/2020    Procedure: CHOLECYSTECTOMY, LAPAROSCOPIC;  Surgeon: SON Rowe MD;  Location: [facility];  Service:  General;  Laterality: N/A;    LASER LITHOTRIPSY N/A 6/30/2021    Procedure: LITHOTRIPSY, USING LASER;  Surgeon: William Diaz MD;  Location: [facility] OR 1ST FLR;  Service: Urology;  Laterality: N/A;    LIVER BIOPSY N/A 5/4/2020    Procedure: BIOPSY, LIVER, Laproscopic ;  Surgeon: SON Rowe MD;  Location: [facility];  Service: General;  Laterality: N/A;    perianal surgery      perianal cyst removed    PYELOSCOPY Right 6/30/2021    Procedure: PYELOSCOPY;  Surgeon: William Diaz MD;  Location: [facility] OR 1ST FLR;  Service: Urology;  Laterality: Right;    PYELOSCOPY Right 10/16/2023    Procedure: PYELOSCOPY;  Surgeon: Chrystal Dias MD;  Location: [facility] OR 1ST FLR;  Service: Urology;  Laterality: Right;    REPLACEMENT OF STENT Right 6/30/2021    Procedure: REPLACEMENT, STENT;  Surgeon: William Diaz MD;  [facility];  Service: Urology;  Laterality: Right;    RETROGRADE PYELOGRAPHY Right 10/16/2023    Procedure: PYELOGRAM, RETROGRADE;  Surgeon: Chrystal Dias MD;  [facility];  Service: Urology;  Laterality: Right;    URETERAL STENT PLACEMENT Right 10/16/2023    Procedure: INSERTION, STENT, URETER;  Surgeon: Chrystal Dias MD;  [facility];  Service: Urology;  Laterality: Right;    URETEROSCOPY Right 6/30/2021    Procedure: URETEROSCOPY FLEXIBLE URETEROSCOPE;  Surgeon: William Diaz MD;  Location: [facility] OR 1ST FLR;  Service: Urology;  Laterality: Right;    URETEROSCOPY Right 10/16/2023    Procedure: URETEROSCOPY;  Surgeon: Chrystal Dias MD;  Location: [facility] OR [facility];  Service: Urology;  Laterality: Right;       Review of patient's allergies indicates:  No Known Allergies    Family History       Problem Relation (Age of Onset)    Cancer Mother, Father, Paternal Grandfather, Brother    Diabetes Maternal Grandmother    Heart disease Father    No Known Problems Paternal Grandmother    Obesity Sister    Parkinsonism Brother    Stroke Maternal  Grandfather            Tobacco Use    Smoking status: Former     Current packs/day: 0.00     Average packs/day: 2.0 packs/day for 30.0 years (60.0 ttl pk-yrs)     Types: Cigarettes     Start date: 1975     Quit date: 2005     Years since quittin.8    Smokeless tobacco: Never   Substance and Sexual Activity    Alcohol use: No     Comment: started ~, reports 1 shot daily, max 3 shots daily, vague about alcohol consumption. Last drink 2018    Drug use: No    Sexual activity: Not on file       Review of Systems    Objective:     Temp:  [98.1 °F (36.7 °C)] 98.1 °F (36.7 °C)  Pulse:  [78] 78  Resp:  [20] 20  SpO2:  [100 %] 100 %  BP: (174)/(82) 174/82     Body mass index is 46.13 kg/m².    No intake/output data recorded.       Drains       Drain  Duration                  Ureteral Drain/Stent 21 0912 Right ureter 24 Fr. 902 days                      Physical Exam  Constitutional:       Appearance: He is obese.   Pulmonary:      Effort: Pulmonary effort is normal.   Musculoskeletal:         General: Swelling present.   Neurological:      General: No focal deficit present.          Significant Labs:    BMP:  Recent Labs   Lab 23  0756      K 4.9      CO2 23   BUN 34*   CREATININE 3.0*   CALCIUM 7.9*       CBC:  Recent Labs   Lab 23  0756   WBC 5.79   HGB 11.2*   HCT 35.3*          All pertinent labs results from the past 24 hours have been reviewed.    Significant Imaging:  All pertinent imaging results/findings from the past 24 hours have been reviewed.                Assessment and Plan:     Obstruction of right ureteropelvic junction (UPJ) due to stone  - OR today for R URS, LL and stone extraction   - urine dip negative for all components   - consent obtained, all questions answered         VTE Risk Mitigation (From admission, onward)           Ordered     IP VTE HIGH RISK PATIENT  Once         23 0623     Place sequential compression device  Until  discontinued         12/06/23 0623                    Yamini García MD  Urology  [facility] - Surgery (1st Fl)

## 2023-12-06 NOTE — ANESTHESIA PREPROCEDURE EVALUATION
Pre-operative evaluation for Procedure(s) (LRB):  CYSTOURETEROSCOPY, WITH HOLMIUM LASER LITHOTRIPSY OF URETERAL CALCULUS AND STENT REPLACEMENT (Right)      Jian Arrieta is a 68 y.o. male PMHx MARIE (liver bx 3/2019 w/o cirrhosis), hx of sleeve gastrectomy, CAD s/p CABG, and CKD3 w/ hx of obstructive nephrolithiasis who was admitted from nephrology clinic with worsening renal function for last 2 weeks. He now presents for the above procedure.       Prev airway (6/30/21):     Induction:  Intravenous    Intubated:  Postinduction    Mask Ventilation:  Easy with oral airway    Attempts:  1    Attempted By:  Student    Method of Intubation:  Video laryngoscopy    Blade:  Butcher 3    Laryngeal View Grade: Grade I - full view of chords      Difficult Airway Encountered?: No      Complications:  None    Airway Device Size:  7.5    Style/Cuff Inflation:  Cuffed (inflated to minimal occlusive pressure)    Placement Verified By:  Capnometry    Findings Post-Intubation:  BS equal bilateral    EKG (10/12/23):   Vent. Rate : 077 BPM     Atrial Rate : 077 BPM      P-R Int : 182 ms          QRS Dur : 114 ms       QT Int : 434 ms       P-R-T Axes : 061 -57 081 degrees      QTc Int : 491 ms     Normal sinus rhythm   Left axis deviation   Nonspecific T wave abnormality   Abnormal ECG     2D Echo (10/13/2023):   Left Ventricle: The left ventricle is normal in size. Normal wall thickness. Septal motion is consistent with post-operative status. There is low normal systolic function with a visually estimated ejection fraction of 50 - 55%. There is normal diastolic function.    Right Ventricle: Normal right ventricular cavity size. Wall thickness is normal. Right ventricle wall motion  is normal. Systolic function is normal.    Pulmonary Artery: The estimated pulmonary artery systolic pressure is 21 mmHg.    IVC/SVC: Normal venous pressure at 3 mmHg.      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), managed as type 1    Sleep apnea    Diabetic polyneuropathy associated with type 2 diabetes mellitus    Status post bariatric surgery    Onychomycosis of multiple toenails with type 2 diabetes mellitus and peripheral neuropathy    Aortic atherosclerosis    Other chronic pancreatitis    Pseudocyst of pancreas    Acute on chronic combined systolic and diastolic heart failure    Lymphedema of both lower extremities    Type 2 diabetes mellitus with diabetic neuropathy, with long-term current use of insulin    Hypoalbuminemia    Other ascites    Anemia    Paroxysmal atrial fibrillation    Hypertensive emergency    Hyperlipidemia associated with type 2 diabetes mellitus    Obesity hypoventilation syndrome    Hepatic fibrosis    Portal hypertension due to obstruction of extrahepatic portal vein    Gastritis    Chronic kidney disease, stage 3    Medically noncompliant    Type 2 diabetes mellitus with stage 3 chronic kidney disease, with long-term current use of insulin    Current use of long term anticoagulation    Other cirrhosis of liver    Diabetes mellitus due to underlying condition, uncontrolled, with renal complication    Atherosclerosis of aorta    Class 3 obesity    Hx of CABG    Vitamin D deficiency    Obstruction of right ureteropelvic junction (UPJ) due to stone    Bilateral hydrocele    Uric acid kidney stone    Hematoma of scrotum    Ureteral stone    History of colon polyps    Venous insufficiency of both lower extremities    Pseudomonas aeruginosa infection    MSSA infection, non-invasive    Diabetes mellitus type 1, with complication, on long term insulin pump    Right flank pain    Hematuria, microscopic    ELIEZER (acute kidney injury)    Venous stasis dermatitis of both lower extremities    Insulin pump status    Hydronephrosis    Diabetic nephropathy associated with type 2 diabetes mellitus    Nephrotic range proteinuria       Review of  "patient's allergies indicates:  No Known Allergies     No current facility-administered medications on file prior to encounter.     Current Outpatient Medications on File Prior to Encounter   Medication Sig Dispense Refill    hydrALAZINE (APRESOLINE) 25 MG tablet Take 1 tablet (25 mg total) by mouth every 8 (eight) hours. 90 tablet 11    insulin pump cart,automated,BT (OMNIPOD 5 G6 PODS, GEN 5,) Crtg Inject 1 each into the skin every other day. Use with omnipod 5 pdm as directed. 15 each 11    tamsulosin (FLOMAX) 0.4 mg Cap Take 1 capsule (0.4 mg total) by mouth every evening. (Patient taking differently: Take 0.4 mg by mouth 2 (two) times a day.) 90 capsule 3    acetaminophen (TYLENOL) 325 MG tablet Take 2 tablets (650 mg total) by mouth every 8 (eight) hours as needed for Pain. 30 tablet 0    amLODIPine (NORVASC) 10 MG tablet Take 1 tablet (10 mg total) by mouth once daily. 30 tablet 11    aspirin (ECOTRIN) 81 MG EC tablet Take 1 tablet (81 mg total) by mouth once daily. 9 tablet 0    blood sugar diagnostic (ONETOUCH VERIO TEST STRIPS) Strp 1 each by Misc.(Non-Drug; Combo Route) route 2 (two) times a day. 70 each 3    blood-glucose sensor (DEXCOM G6 SENSOR) Sandi Inject 1 each into the skin every 10 days. 3 each 11    blood-glucose transmitter (DEXCOM G6 TRANSMITTER) Sandi Inject 1 each into the skin every 10 days. 1 each 3    glucagon (BAQSIMI) 3 mg/actuation Spry 1 each by Nasal route daily as needed (if glucose is less than 70 - can repeat in 1 hour if still low/less than 70). 2 each 3    lancets (ONETOUCH DELICA PLUS LANCET) 33 gauge Misc 1 lancet  by Misc.(Non-Drug; Combo Route) route 2 (two) times daily. 70 each 3    pen needle, diabetic 32 gauge x 5/32" Ndle Use with toujeo insulin one daily only as Emergency use/back up insulin - if OFF OF your insulin pump. 30 each 3       Past Surgical History:   Procedure Laterality Date    ANGIOGRAPHY N/A 6/28/2019    Procedure: ANGIOGRAM-PV STENT;  Surgeon: Ewa " Diagnostic Provider;  Location: [facility] OR;  Service: Radiology;  Laterality: N/A;    ANGIOPLASTY      total x5 stents    COLONOSCOPY N/A 10/6/2015    Procedure: COLONOSCOPY;  Surgeon: Shekhar Richards MD;  Location: [facility] (2ND FLR);  Service: Endoscopy;  Laterality: N/A;  BMI over 55/2nd floor case    5 day hold Plavix, Dr Kwadwo Arroyo    COLONOSCOPY N/A 5/13/2021    Procedure: COLONOSCOPY;  Surgeon: Huan Brumfield MD;  Location: [facility];  Service: Endoscopy;  Laterality: N/A;    CORONARY ANGIOGRAPHY Right 3/20/2019    Procedure: ANGIOGRAM, CORONARY ARTERY;  Surgeon: Bob Duque MD;  Location: [facility] CATH LAB;  Service: Cardiology;  Laterality: Right;    CORONARY ARTERY BYPASS GRAFT  2017    x3    CORONARY BYPASS GRAFT ANGIOGRAPHY  3/20/2019    Procedure: Bypass graft study;  Surgeon: Bob Duque MD;  Location: [facility] CATH LAB;  Service: Cardiology;;    CYST REMOVAL      CYSTOSCOPY Right 6/30/2021    Procedure: CYSTOSCOPY;  Surgeon: William Diaz MD;  Location: [facility] OR 1ST FLR;  Service: Urology;  Laterality: Right;    CYSTOSCOPY N/A 10/16/2023    Procedure: CYSTOSCOPY;  Surgeon: Chrystal Dias MD;  Location: [facility] OR 1ST FLR;  Service: Urology;  Laterality: N/A;    CYSTOSCOPY W/ URETERAL STENT PLACEMENT Right 6/16/2021    Procedure: CYSTOSCOPY, WITH URETERAL STENT INSERTION;  Surgeon: William Diaz MD;  Location: [facility] OR 2ND FLR;  Service: Urology;  Laterality: Right;  FLUORO LESS THAN 1 HOUR    ENDOSCOPIC ULTRASOUND OF UPPER GASTROINTESTINAL TRACT N/A 2/26/2020    Procedure: ULTRASOUND, UPPER GI TRACT, ENDOSCOPIC;  Surgeon: Robbie Yang MD;  Location: [facility];  Service: Endoscopy;  Laterality: N/A;    ESOPHAGOGASTRODUODENOSCOPY N/A 7/8/2019    Procedure: ESOPHAGOGASTRODUODENOSCOPY (EGD);  Surgeon: Huan Brumfield MD;  Location: [facility];  Service: Endoscopy;  Laterality: N/A;    ESOPHAGOGASTRODUODENOSCOPY N/A 5/13/2021    Procedure: EGD (ESOPHAGOGASTRODUODENOSCOPY);  Surgeon:  Huan Brumfield MD;  Location: [facility] ENDO;  Service: Endoscopy;  Laterality: N/A;    EXCISION OF HYDROCELE Bilateral 6/16/2021    Procedure: HYDROCELE REPAIR;  Surgeon: William Diaz MD;  Location: [facility] OR 2ND FLR;  Service: Urology;  Laterality: Bilateral;  2 HOURS    FLUOROSCOPY N/A 6/16/2021    Procedure: FLUOROSCOPY;  Surgeon: William Diaz MD;  Location: [facility] OR 2ND FLR;  Service: Urology;  Laterality: N/A;    GASTRECTOMY      INSERTION OF DIALYSIS CATHETER N/A 5/17/2019    Procedure: pleurx;  Surgeon: [facility] Diagnostic Provider;  Location: [facility] OR 2ND FLR;  Service: General;  Laterality: N/A;  Room 188/[facility]    KNEE ARTHROSCOPY      LAPAROSCOPIC CHOLECYSTECTOMY N/A 5/4/2020    Procedure: CHOLECYSTECTOMY, LAPAROSCOPIC;  Surgeon: SON oRwe MD;  Location: [facility] OR;  Service: General;  Laterality: N/A;    LASER LITHOTRIPSY N/A 6/30/2021    Procedure: LITHOTRIPSY, USING LASER;  Surgeon: William Diaz MD;  Location: [facility] OR 1ST FLR;  Service: Urology;  Laterality: N/A;    LIVER BIOPSY N/A 5/4/2020    Procedure: BIOPSY, LIVER, Laproscopic ;  Surgeon: SON Rowe MD;  Location: [facility] OR;  Service: General;  Laterality: N/A;    perianal surgery      perianal cyst removed    PYELOSCOPY Right 6/30/2021    Procedure: PYELOSCOPY;  Surgeon: William Diaz MD;  Location: [facility] OR 1ST FLR;  Service: Urology;  Laterality: Right;    PYELOSCOPY Right 10/16/2023    Procedure: PYELOSCOPY;  Surgeon: Chrystal Dias MD;  Location: [facility] OR 1ST FLR;  Service: Urology;  Laterality: Right;    REPLACEMENT OF STENT Right 6/30/2021    Procedure: REPLACEMENT, STENT;  Surgeon: William Diaz MD;  Location: [facility] OR 1ST FLR;  Service: Urology;  Laterality: Right;    RETROGRADE PYELOGRAPHY Right 10/16/2023    Procedure: PYELOGRAM, RETROGRADE;  Surgeon: Chrystal Dias MD;  Location: [facility] OR 1ST FLR;  Service: Urology;  Laterality: Right;    URETERAL STENT PLACEMENT Right 10/16/2023    Procedure: INSERTION,  STENT, URETER;  Surgeon: Chrystal Dias MD;  Location: [facility] OR [address] [place] [address];  Service: Urology;  Laterality: Right;    URETEROSCOPY Right 2021    Procedure: URETEROSCOPY FLEXIBLE URETEROSCOPE;  Surgeon: William Diaz MD;  Location: [facility] OR [address] [place] [address];  Service: Urology;  Laterality: Right;    URETEROSCOPY Right 10/16/2023    Procedure: URETEROSCOPY;  Surgeon: Chrystal Dias MD;  Location: [facility] OR [address] [place] [address];  Service: Urology;  Laterality: Right;       Social History     Socioeconomic History    Marital status:    Tobacco Use    Smoking status: Former     Current packs/day: 0.00     Average packs/day: 2.0 packs/day for 30.0 years (60.0 ttl pk-yrs)     Types: Cigarettes     Start date: 1975     Quit date: 2005     Years since quittin.8    Smokeless tobacco: Never   Substance and Sexual Activity    Alcohol use: No     Comment: started ~, reports 1 shot daily, max 3 shots daily, vague about alcohol consumption. Last drink 2018    Drug use: No     Social Determinants of Health     Financial Resource Strain: Low Risk  (10/13/2023)    Overall Financial Resource Strain (CARDIA)     Difficulty of Paying Living Expenses: Not very hard   Physical Activity: Unknown (10/13/2023)    Exercise Vital Sign     Days of Exercise per Week: 7 days     Minutes of Exercise per Session: Patient refused   Stress: Unknown (10/13/2023)    [employer] [place] [employer] - Occupational Stress Questionnaire     Feeling of Stress : Patient refused   Social Connections: Moderately Isolated (10/13/2023)    Social Connection and Isolation Panel [NHANES]     Frequency of Communication with Friends and Family: More than three times a week     Frequency of Social Gatherings with Friends and Family: More than three times a week     Attends Yazdanism Services: Never     Active Member of Clubs or Organizations: No     Attends Club or Organization Meetings: Never     Marital Status:          Vital  "Signs Range (Last 24H):  Temp:  [36.7 °C (98.1 °F)]   Pulse:  [78]   Resp:  [20]   BP: (174)/(82)   SpO2:  [100 %]       CBC:   No results for input(s): "WBC", "RBC", "HGB", "HCT", "PLT", "MCV", "MCH", "MCHC" in the last 72 hours.      CMP:   No results for input(s): "NA", "K", "CL", "CO2", "BUN", "CREATININE", "GLU", "MG", "PHOS", "CALCIUM", "ALBUMIN", "PROT", "ALKPHOS", "ALT", "AST", "BILITOT" in the last 72 hours.      INR  No results for input(s): "PT", "INR", "PROTIME", "APTT" in the last 72 hours.          Pre-op Assessment    I have reviewed the Patient Summary Reports.     I have reviewed the Nursing Notes. I have reviewed the NPO Status.   I have reviewed the Medications.     Review of Systems  Anesthesia Hx:  No problems with previous Anesthesia             Denies Family Hx of Anesthesia complications.    Denies Personal Hx of Anesthesia complications.                    Hematology/Oncology:    Oncology Normal                                   Cardiovascular:     Hypertension Valvular problems/Murmurs  CAD   CABG/stent                                     Pulmonary:        Sleep Apnea                Renal/:  Chronic Renal Disease                Hepatic/GI:      Liver Disease,            Neurological:    Denies CVA. Neuromuscular Disease,   Seizures                                Endocrine:  Diabetes, poorly controlled         Obesity / BMI > 30      Physical Exam  General: Alert and Oriented    Airway:  Mallampati: II   Mouth Opening: Normal  TM Distance: Normal  Tongue: Normal    Dental:  Periodontal disease    Chest/Lungs:  Clear to auscultation, Normal Respiratory Rate    Heart:  Rate: Normal  Rhythm: Regular Rhythm        Anesthesia Plan  Type of Anesthesia, risks & benefits discussed:    Anesthesia Type: Gen ETT  Intra-op Monitoring Plan: Standard ASA Monitors  Post Op Pain Control Plan: IV/PO Opioids PRN and multimodal analgesia  Induction:  IV  Airway Plan: Direct  Informed Consent: Informed " consent signed with the Patient and all parties understand the risks and agree with anesthesia plan.  All questions answered.   ASA Score: 3  Day of Surgery Review of History & Physical: H&P Update referred to the surgeon/provider.    Ready For Surgery From Anesthesia Perspective.     .

## 2023-12-06 NOTE — HPI
68M with hx of nephrolithiasis s/p R ureteral stent placement and pyeloscopy on 10/16 presents for R ureteroscopy, LL, and stone extraction.     Urine dip negative for all components.

## 2023-12-06 NOTE — OP NOTE
[facility]  Operative Note    Date: 12/06/2023    Pre-Op Diagnosis: Right renal stone    Patient Active Problem List    Diagnosis Date Noted    Hydronephrosis 10/16/2023    Diabetic nephropathy associated with type 2 diabetes mellitus 10/16/2023    Nephrotic range proteinuria 10/16/2023    ELIEZER (acute kidney injury) 10/13/2023    Venous stasis dermatitis of both lower extremities 10/13/2023    Insulin pump status 10/13/2023    Right flank pain 10/09/2023    Hematuria, microscopic 10/09/2023    Diabetes mellitus type 1, with complication, on long term insulin pump 06/07/2023    Pseudomonas aeruginosa infection 08/19/2022    MSSA infection, non-invasive 08/19/2022    Venous insufficiency of both lower extremities 06/14/2022    History of colon polyps 01/13/2022    Ureteral stone 06/30/2021    Hematoma of scrotum 06/21/2021    Uric acid kidney stone 05/27/2021    Obstruction of right ureteropelvic junction (UPJ) due to stone 05/24/2021    Bilateral hydrocele 05/24/2021    Vitamin D deficiency 04/07/2021    Hx of CABG 03/31/2021    Class 3 obesity 03/15/2021    Atherosclerosis of aorta 05/11/2020    Other cirrhosis of liver     Diabetes mellitus due to underlying condition, uncontrolled, with renal complication     Current use of long term anticoagulation 03/10/2020    Type 2 diabetes mellitus with stage 3 chronic kidney disease, with long-term current use of insulin 11/15/2019    Medically noncompliant 08/22/2019    Chronic kidney disease, stage 3 07/23/2019    Gastritis     Portal hypertension due to obstruction of extrahepatic portal vein 06/27/2019    Hepatic fibrosis 06/25/2019    Hypertensive emergency 05/06/2019    Hyperlipidemia associated with type 2 diabetes mellitus 05/06/2019    Obesity hypoventilation syndrome 05/06/2019    Paroxysmal atrial fibrillation 03/16/2019    Anemia 03/15/2019    Other ascites 03/13/2019    Type 2 diabetes mellitus with diabetic neuropathy, with long-term current  use of insulin 02/04/2019    Hypoalbuminemia 02/04/2019    Acute on chronic combined systolic and diastolic heart failure 01/29/2019    Lymphedema of both lower extremities 01/29/2019    Other chronic pancreatitis 01/28/2019    Pseudocyst of pancreas 01/28/2019    Aortic atherosclerosis     Onychomycosis of multiple toenails with type 2 diabetes mellitus and peripheral neuropathy 06/20/2017    Status post bariatric surgery 01/11/2016    Diabetic polyneuropathy associated with type 2 diabetes mellitus 09/02/2015    Sleep apnea 08/26/2015    Coronary artery disease due to calcified coronary lesion 05/08/2015    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 Right ureteroscopy with basket extraction of stone fragments  2. Cystoscopy  3. Right pyeloscopy  4. Right retrograde pyelogram  5. Fluoro < 1 hr    Specimen(s): Stone Fragments    Staff Surgeon: William Diaz MD    Assistant Surgeon: Nicolas Carlton DO    Anesthesia: General endotracheal anesthesia    Indications: Jian Arrieta is a 68 y.o. male with a right renal stone presenting for definitive stone management. He is pre-stented.    Findings:  1. Stone not visible on  film.  2. Stone encountered in right mid and upper pole. Fragmented into dust on basket extraction, all remaining stone fragments small enough to pass  3. Retrograde pyelogram showed delicate calices with no evidence of hydronephrosis, no filling defects, and ureter normal in course and caliber      Estimated Blood Loss: min    Drains:   None    Procedure in detail:  After risks, benefits, and possible complications were explained, the patient elected to undergo the procedure and informed consent was obtained. All questions were answered in the gulshan-operative area. The patient was transferred to the cystoscopy suite and placed in the supine position. SCDs were applied and working. Anesthesia was administered.  The patient was then placed in the dorsal lithotomy position and prepped and draped in the usual sterile fashion. Time out was performed, and gulshan-procedural antibiotics were confirmed.     The stone was not visible on  film. A rigid cystoscope in a 22 Fr sheath was introduced into the patient's urethra. This passed easily. The entire urethra was visualized which showed no strictures or masses. Cystoscopy revealed the ureteral orifices in the normal anatomic location bilaterally.    A motion wire was passed next to the stent and into the kidney with fluoroscopic confirmation. The patient's right ureteral stent was grasped with alligator graspers and brought to the meatus. The stent was was removed keeping the wire in place.     An 8 Fr rigid ureteroscope was passed into the patient's bladder alongside the wire under direct vision. It was then passed through the right ureteral orifice alongside the wire. No stones were visualized in the ureter.    A motion wire was inserted through the ureteroscope and into the kidney with fluoroscopic confirmation. The ureteroscope was removed keeping both wires in place.    A 10.7/12.7 Fr 35 cm ureteral access sheath was advanced over the motion wire to the level of the renal pelvis under continuous fluoroscopy. This passed easily. The inner dilator and motion wire were removed keeping the outer sheath in place. An 8 Fr flexible ureteroscope was advanced through the access sheath and into the kidney.    Stones were encountered in upper and mid poles. An NCircle basket was inserted per the scope and fragments were removed and passed off the field for analysis. Systematic pyeloscopy was performed and all remaining stone fragments were deemed small enough to pass spontaneously, <1 mm. The scope and ureteral access sheath were removed keeping the motion wire in place.    The cystoscope was reinserted and the bladder was irrigated to remove the stone fragments. The bladder was  drained and the cystoscope removed, as well as the wire.    The patient tolerated the procedure well and was transferred to the recovery room in stable condition.    Disposition:  The patient will be discharged home from PACU. He follow up with Dr. Diaz in  in 3 months with a  CTRSS, KUB, BMP and Uric Acid study  prior.   Pt will start Urocit K 10 MEq TID to alkalinize the urine in order to dissolve a suspected uric acid stone in the left kidney.    Nicolas Carlton,

## 2023-12-06 NOTE — DISCHARGE INSTRUCTIONS
Post Cystoscopy Instructions      Follow up with Dr. Diaz in 3 months with CTRSS, KUB X-ray and Labs  Start UroCitk 10 mEq three times a day  Do not strain to have a bowel movement  No strenuous exercise x 7 days  No driving while you are on narcotic pain medications or if your mathew  catheter is in place    You can expect:  To pass stone fragments if you had a stone procedure  Have pain when you void from your stent if you have a stent in place  See blood in your urine if you have a stent in place    If you have a catheter, please return to the ER if your catheter stops draining or you are having abdominal pain.    Call the doctor if:  Temperature is greater than 101F  Persistent vomiting and inability to keep food down  Inability to void if you do not have a catheter

## 2023-12-06 NOTE — BRIEF OP NOTE
[facility] - [facility] (1st Fl)  Brief Operative Note    Surgery Date: 12/6/2023     Surgeon(s) and Role:     * William Diaz MD - Primary     * Yamini García MD - Resident - Assisting     * Nicolas Carlton DO - Resident - Assisting        Pre-op Diagnosis:  Ureteral stent present [Z96.0]  Ureteral stone [N20.1]    Post-op Diagnosis:  Post-Op Diagnosis Codes:     * Ureteral stent present [Z96.0]     * Ureteral stone [N20.1]    Procedure(s) (LRB):  CYSTOSCOPY (N/A)  URETEROSCOPY (Right)  REMOVAL-STENT (Right)  PYELOGRAM, RETROGRADE (Right)  EXTRACTION - STONE (Right)  PYELOSCOPY (Right)    Anesthesia: General    Operative Findings:   Stone fragments found in the middle and upper poles, crushed with basket and fragments extracted  Retrograde pyelogram shows no filling defects  Ureteral stent removed    Estimated Blood Loss: * No values recorded between 12/6/2023  8:24 AM and 12/6/2023  9:09 AM *         Specimens:   Specimen (24h ago, onward)      None              Discharge Note    OUTCOME: Patient tolerated treatment/procedure well without complication and is now ready for discharge.    DISPOSITION: Home or Self Care    FINAL DIAGNOSIS:  <principal problem not specified>    FOLLOWUP: In clinic with Dr. Diaz in 3 months with CTRSS and KUB    DISCHARGE INSTRUCTIONS:  No discharge procedures on file.

## 2023-12-07 ENCOUNTER — TELEPHONE (OUTPATIENT)
Dept: UROLOGY | Facility: CLINIC | Age: 68
End: 2023-12-07
Payer: MEDICARE

## 2023-12-07 ENCOUNTER — PATIENT MESSAGE (OUTPATIENT)
Dept: UROLOGY | Facility: CLINIC | Age: 68
End: 2023-12-07
Payer: MEDICARE

## 2023-12-07 NOTE — TELEPHONE ENCOUNTER
Please have him follow up with his cardiologist ASAP.  He had a chest pain following cysto yesterday.    I will see him in 3 months with BMP, uric acid, KUB and CT RSS.

## 2023-12-08 NOTE — PROGRESS NOTES
See evaluation in POC for goals and assessment     Eval Date: 12/07/2023    Luisa Thompson, PT, DPT, CLT

## 2023-12-08 NOTE — PLAN OF CARE
Physical Therapy Daily Treatment Note/ POC Update      Name: Jian JASMINE [facility] Number: [id number]    Therapy Diagnosis:   Encounter Diagnoses   Name Primary?    Venous insufficiency of both lower extremities Yes    Type 2 diabetes mellitus with stage 3 chronic kidney disease, with long-term current use of insulin, unspecified whether stage 3a or 3b CKD        Physician: Savanah Felton DPM    Visit Date: 12/5/2023    Physician: Savanah Felton DPM     Physician Orders: PT Eval and Treat - lymphedema  Medical Diagnosis from Referral: I89.0 (ICD-10-CM) - Lymphedema of both lower extremities    L03.119 (ICD-10-CM) - Cellulitis of lower extremity, unspecified laterality     Evaluation Date: 7/31/2023  Authorization Period Expiration: 12/31/2023  Plan of Care Expiration: 1/5/2023  Visit # / Visits authorized: 18/ 30     Time In8:10am  Time Out: 9:00am  Total Billable Time: 50 minutes      Precautions: Standard and Diabetes    Subjective     Pt reports:he has been doing better since his flare up with the ELIEZER  He was compliant with home exercise program.  Response to previous treatment: good response.  Functional change: none    Pain: 0/10  Location: bilateral lower legs     Objective     12/5/2023            Treatment:   Jian received the following manual therapy techniques:- Manual Lymphatic Drainage were applied to the: BLE for 50 minutes, including: MLD and short stretch compression bandaging       MANUAL LYMPHATIC DRAINAGE (MLD):    While supine with LEs elevated stimulation at terminus, along GI region, B inguinal regions, drainage of entire BLE betina lower leg, ankle, and foot with return proximally,  Use of Aquaphor due to dryness.   Educated in self massage to abdominal areas, B inguinal areas, thigh, and remaining LE within reach.      MULTILAYERED BANDAGING: not performed pt left at home and brought velcro wraps to apply       Home Exercises Provided and Patient Education Provided     Self care/home  management for 20 minutes :   - Pt brings compreflex XL velcro wraps   - Donned tubigrip with double fold on foot and ankle . Donned velcro wrap to RLE over tubigrip with education on donning/doffing and proper fit . Pt self applied tubigrip and velcro wrap to LLE with therapist guidance.   - Advised to apply in the morning and can remove before bed .     Education provided:    Remove bandages if painful  PATIENT/FAMILY Education: bandaging wear schedule,  HEP,  Beginning of self massage,  Self or assisted bandaging, compression options, and Risk reduction    Written Home Exercises Provided: Patient instructed to cont prior HEP.  Exercises were reviewed and Jian was able to demonstrate them prior to the end of the session.  Jian demonstrated good  understanding of the education provided.       Assessment     Measurements taken today show significant decreases in patients recent flareup of edema. Instead of bandaging, Velcro wraps are reapplied to patients legs, and patient remains compliant with the use of Velcro wraps at home. Patient in agreement to try final push of therapy to reduce legs and prepare for home management of edema. Will continue to progress .     Jian Is progressing well towards his goals.   Pt prognosis is Fair.     Pt will continue to benefit from skilled outpatient physical therapy to address the deficits listed in the problem list box on initial evaluation, provide pt/family education and to maximize pt's level of independence in the home and community environment.   Pt's spiritual, cultural and educational needs considered and pt agreeable to plan of care and goals.     Anticipated barriers to physical therapy: none    Goals:        Short Term Goals: (6 weeks)  1. Patient will show decreased girth in B LE by up to 1 cm to allow for LE symmetry, shoe and clothing choice, and ability to apply needed compression. MET  2. Patient will demonstrate 100% knowledge of lymphedema precautions and  signs of infection to allow for reduced lymphedema risk, infection risk, and/or exacerbation of condition.  MET  3. Patient or caregiver will perform self-bandaging techniques and/or wearing of compression garments to allow for lymphatic drainage support, skin elasticity, and reduction in shape and size of limb. MET  4. Patient will perform self lymph drainage techniques to areas within reach to enhance lymphatic drainage and skin condition.  (progressing, not met)  5. Patient will tolerate daily activities with multilayered bandaging to allow for lymphatic and venous support.  (progressing, not met)     Long Term Goals: (12  weeks)  1. Patient will show decreased girth in B LE by up to 2 cm  to allow for LE symmetry, shoe and clothing choice, and ability to apply needed compression daily. MET  2. Patient will show reduction in density to mild or less with improved contour of limb to allow for cosmesis, LE symmetry, infection risk reduction, and clothing and compression choice.   (progressing, not met)  3. Patient to krystal/doff compression garment with daily compliance to assist in lymphedema management, skin elasticity, and tissue density.  (progressing, not met)  4. Pt to show improved postural awareness and alignment.  (progressing, not met)  5. Pt to be I and compliant with HEP to allow for increased function in affected limb.   (progressing, not met)    Plan   Continue PT  2x   weekly for Complete Decongestive Therapy:  Manual lymphatic drainage, Multilayered short stretch bandaging, Pneumatic compression, Therapeutic exercises, Patient education as deemed necessary to achieve stated goals.      Luisa Thompson, PT

## 2023-12-11 ENCOUNTER — CLINICAL SUPPORT (OUTPATIENT)
Dept: REHABILITATION | Facility: HOSPITAL | Age: 68
End: 2023-12-11
Payer: MEDICARE

## 2023-12-11 DIAGNOSIS — Z79.4 TYPE 2 DIABETES MELLITUS WITH STAGE 3 CHRONIC KIDNEY DISEASE, WITH LONG-TERM CURRENT USE OF INSULIN, UNSPECIFIED WHETHER STAGE 3A OR 3B CKD: ICD-10-CM

## 2023-12-11 DIAGNOSIS — E11.22 TYPE 2 DIABETES MELLITUS WITH STAGE 3 CHRONIC KIDNEY DISEASE, WITH LONG-TERM CURRENT USE OF INSULIN, UNSPECIFIED WHETHER STAGE 3A OR 3B CKD: ICD-10-CM

## 2023-12-11 DIAGNOSIS — N18.30 TYPE 2 DIABETES MELLITUS WITH STAGE 3 CHRONIC KIDNEY DISEASE, WITH LONG-TERM CURRENT USE OF INSULIN, UNSPECIFIED WHETHER STAGE 3A OR 3B CKD: ICD-10-CM

## 2023-12-11 DIAGNOSIS — I87.2 VENOUS INSUFFICIENCY OF BOTH LOWER EXTREMITIES: Primary | ICD-10-CM

## 2023-12-11 LAB
COMPN STONE: NORMAL
LABORATORY COMMENT REPORT: NORMAL
SPECIMEN SOURCE: NORMAL
STONE ANALYSIS IR-IMP: NORMAL

## 2023-12-11 PROCEDURE — 97140 MANUAL THERAPY 1/> REGIONS: CPT

## 2023-12-12 RX ORDER — OXYBUTYNIN CHLORIDE 5 MG/1
5 TABLET ORAL 3 TIMES DAILY PRN
Qty: 30 TABLET | Refills: 0 | Status: CANCELLED | OUTPATIENT
Start: 2023-12-12 | End: 2023-12-22

## 2023-12-12 NOTE — PROGRESS NOTES
Physical Therapy Daily Treatment Note     Name: Jian JASMINE [facility] Number: [id number]    Therapy Diagnosis:   Encounter Diagnoses   Name Primary?    Venous insufficiency of both lower extremities Yes    Type 2 diabetes mellitus with stage 3 chronic kidney disease, with long-term current use of insulin, unspecified whether stage 3a or 3b CKD        Physician: Savanah Felton DPM    Visit Date: 12/13/2023    Physician: Savanah Felton DPM  Physician Orders: PT Eval and Treat - lymphedema  Medical Diagnosis from Referral: I89.0 (ICD-10-CM) - Lymphedema of both lower extremities    L03.119 (ICD-10-CM) - Cellulitis of lower extremity, unspecified laterality  Evaluation Date: 7/31/2023  Authorization Period Expiration: 12/31/2023  Plan of Care Expiration: 1/5/2023  Visit # / Visits authorized: 20/ 30     Time In:9:00am  Time Out:10:00am  Total Billable Time: 60 minutes      Precautions: Standard and Diabetes    Subjective     Pt reports:he hasn't taken off his velcro wraps since Monday. He takes his fluid pills during the day , does not like taking at night because it makes him have to use the restroom .   He was compliant with home exercise program.  Response to previous treatment: good response.  Functional change: none    Pain: 0/10  Location: bilateral lower legs     Objective     Pt presents Inelastic Velcro wraps donned BLEs     Treatment:   Jian received the following manual therapy techniques:- Manual Lymphatic Drainage were applied to the: BLE for 60 minutes, including: MLD and short stretch compression bandaging       MANUAL LYMPHATIC DRAINAGE (MLD):    While supine with LEs elevated stimulation at terminus, along GI region, B inguinal regions, drainage of entire BLE betina lower leg, ankle, and foot with return proximally,  Use of Aquaphor due to dryness.   Educated in self massage to abdominal areas, B inguinal areas, thigh, and remaining LE within reach.      MULTILAYERED BANDAGING: not performed pt  left at home and brought velcro wraps to apply       Home Exercises Provided and Patient Education Provided     Self care/home management :   - Pt brings compreflex XL velcro wraps   - Donned tubigrip with double fold on foot and ankle . Donned velcro wrap to RLE over tubigrip with education on donning/doffing and proper fit . Pt self applied tubigrip and velcro wrap to LLE with therapist guidance.   - Advised to apply in the morning and can remove before bed .     Education provided:    Remove bandages if painful  PATIENT/FAMILY Education: bandaging wear schedule,  HEP,  Beginning of self massage,  Self or assisted bandaging, compression options, and Risk reduction    Written Home Exercises Provided: Patient instructed to cont prior HEP.  Exercises were reviewed and Jian was able to demonstrate them prior to the end of the session.  Jian demonstrated good  understanding of the education provided.       Assessment     Pt was advised on needs to remove velcro wraps every day to allow for skin care and bathing daily as well as needs to put on clean under liner every day . Pt is progressing well with compliance with daily use of compression velcro wraps . Will continue to progress .     Jian Is progressing well towards his goals.   Pt prognosis is Fair.     Pt will continue to benefit from skilled outpatient physical therapy to address the deficits listed in the problem list box on initial evaluation, provide pt/family education and to maximize pt's level of independence in the home and community environment.   Pt's spiritual, cultural and educational needs considered and pt agreeable to plan of care and goals.     Anticipated barriers to physical therapy: none    Goals:        Short Term Goals: (6 weeks)  1. Patient will show decreased girth in B LE by up to 1 cm to allow for LE symmetry, shoe and clothing choice, and ability to apply needed compression.  (progressing, not met)   2. Patient will demonstrate 100%  knowledge of lymphedema precautions and signs of infection to allow for reduced lymphedema risk, infection risk, and/or exacerbation of condition.  (progressing, not met)  3. Patient or caregiver will perform self-bandaging techniques and/or wearing of compression garments to allow for lymphatic drainage support, skin elasticity, and reduction in shape and size of limb. (progressing, not met)  4. Patient will perform self lymph drainage techniques to areas within reach to enhance lymphatic drainage and skin condition.  (progressing, not met)  5. Patient will tolerate daily activities with multilayered bandaging to allow for lymphatic and venous support.  (progressing, not met)     Long Term Goals: (12  weeks)  1. Patient will show decreased girth in B LE by up to 2 cm  to allow for LE symmetry, shoe and clothing choice, and ability to apply needed compression daily.  (progressing, not met)  2. Patient will show reduction in density to mild or less with improved contour of limb to allow for cosmesis, LE symmetry, infection risk reduction, and clothing and compression choice.   (progressing, not met)  3. Patient to krystal/doff compression garment with daily compliance to assist in lymphedema management, skin elasticity, and tissue density.  (progressing, not met)  4. Pt to show improved postural awareness and alignment.  (progressing, not met)  5. Pt to be I and compliant with HEP to allow for increased function in affected limb.   (progressing, not met)    Plan   Continue PT  2x   weekly for Complete Decongestive Therapy:  Manual lymphatic drainage, Multilayered short stretch bandaging, Pneumatic compression, Therapeutic exercises, Patient education as deemed necessary to achieve stated goals.      Raeann Rodriguez, PTA

## 2023-12-13 ENCOUNTER — CLINICAL SUPPORT (OUTPATIENT)
Dept: REHABILITATION | Facility: HOSPITAL | Age: 68
End: 2023-12-13
Payer: MEDICARE

## 2023-12-13 DIAGNOSIS — Z79.4 TYPE 2 DIABETES MELLITUS WITH STAGE 3 CHRONIC KIDNEY DISEASE, WITH LONG-TERM CURRENT USE OF INSULIN, UNSPECIFIED WHETHER STAGE 3A OR 3B CKD: ICD-10-CM

## 2023-12-13 DIAGNOSIS — N18.30 TYPE 2 DIABETES MELLITUS WITH STAGE 3 CHRONIC KIDNEY DISEASE, WITH LONG-TERM CURRENT USE OF INSULIN, UNSPECIFIED WHETHER STAGE 3A OR 3B CKD: ICD-10-CM

## 2023-12-13 DIAGNOSIS — E11.22 TYPE 2 DIABETES MELLITUS WITH STAGE 3 CHRONIC KIDNEY DISEASE, WITH LONG-TERM CURRENT USE OF INSULIN, UNSPECIFIED WHETHER STAGE 3A OR 3B CKD: ICD-10-CM

## 2023-12-13 DIAGNOSIS — I87.2 VENOUS INSUFFICIENCY OF BOTH LOWER EXTREMITIES: Primary | ICD-10-CM

## 2023-12-13 PROCEDURE — 97140 MANUAL THERAPY 1/> REGIONS: CPT | Mod: CQ

## 2023-12-13 NOTE — PROGRESS NOTES
Physical Therapy Daily Treatment Note     Name: Jian JASMINE [facility] Number: [id number]    Therapy Diagnosis:   Encounter Diagnoses   Name Primary?    Venous insufficiency of both lower extremities Yes    Type 2 diabetes mellitus with stage 3 chronic kidney disease, with long-term current use of insulin, unspecified whether stage 3a or 3b CKD        Physician: Savanah Felton DPM    Visit Date: 12/11/2023    Physician: Savanah Felton DPM     Physician Orders: PT Eval and Treat - lymphedema  Medical Diagnosis from Referral: I89.0 (ICD-10-CM) - Lymphedema of both lower extremities    L03.119 (ICD-10-CM) - Cellulitis of lower extremity, unspecified laterality     Evaluation Date: 7/31/2023  Authorization Period Expiration: 12/31/2023  Plan of Care Expiration: 1/5/2023  Visit # / Visits authorized: 19/ 30     Time In: 9:00am  Time Out: 10:00am  Total Billable Time: 60 minutes      Precautions: Standard and Diabetes    Subjective     Pt reports:he worked right after his surgery and was hurting but he is not urinating blood anymore   He was compliant with home exercise program.  Response to previous treatment: good response.  Functional change: none    Pain: 0/10  Location: bilateral lower legs     Objective       Treatment:   Jian received the following manual therapy techniques:- Manual Lymphatic Drainage were applied to the: BLE for 60 minutes, including: MLD and short stretch compression bandaging       MANUAL LYMPHATIC DRAINAGE (MLD):    While supine with LEs elevated stimulation at terminus, along GI region, B inguinal regions, drainage of entire BLE betina lower leg, ankle, and foot with return proximally,  Use of Aquaphor due to dryness.   Educated in self massage to abdominal areas, B inguinal areas, thigh, and remaining LE within reach.      MULTILAYERED BANDAGING: not performed pt left at home and brought velcro wraps to apply       Home Exercises Provided and Patient Education Provided     Self  care/home management for 20 minutes :   - Pt brings compreflex XL velcro wraps   - Donned tubigrip with double fold on foot and ankle . Donned velcro wrap to RLE over tubigrip with education on donning/doffing and proper fit . Pt self applied tubigrip and velcro wrap to LLE with therapist guidance.   - Advised to apply in the morning and can remove before bed .     Education provided:    Remove bandages if painful  PATIENT/FAMILY Education: bandaging wear schedule,  HEP,  Beginning of self massage,  Self or assisted bandaging, compression options, and Risk reduction    Written Home Exercises Provided: Patient instructed to cont prior HEP.  Exercises were reviewed and Jian was able to demonstrate them prior to the end of the session.  Jian demonstrated good  understanding of the education provided.       Assessment     Pt presents with increased swelling secondary to recent ELIEZER. Pt tolerated treatment well with no reports of pain. Pt's BLE were massaged to decreased edema in the BLEs. Pt remains consistent with the use of velcro wraps. Will continue to progress       Jian Is progressing well towards his goals.   Pt prognosis is Fair.     Pt will continue to benefit from skilled outpatient physical therapy to address the deficits listed in the problem list box on initial evaluation, provide pt/family education and to maximize pt's level of independence in the home and community environment.   Pt's spiritual, cultural and educational needs considered and pt agreeable to plan of care and goals.     Anticipated barriers to physical therapy: none    Goals:        Short Term Goals: (6 weeks)  1. Patient will show decreased girth in B LE by up to 1 cm to allow for LE symmetry, shoe and clothing choice, and ability to apply needed compression.  (progressing, not met)   2. Patient will demonstrate 100% knowledge of lymphedema precautions and signs of infection to allow for reduced lymphedema risk, infection risk, and/or  exacerbation of condition.  (progressing, not met)  3. Patient or caregiver will perform self-bandaging techniques and/or wearing of compression garments to allow for lymphatic drainage support, skin elasticity, and reduction in shape and size of limb. (progressing, not met)  4. Patient will perform self lymph drainage techniques to areas within reach to enhance lymphatic drainage and skin condition.  (progressing, not met)  5. Patient will tolerate daily activities with multilayered bandaging to allow for lymphatic and venous support.  (progressing, not met)     Long Term Goals: (12  weeks)  1. Patient will show decreased girth in B LE by up to 2 cm  to allow for LE symmetry, shoe and clothing choice, and ability to apply needed compression daily.  (progressing, not met)  2. Patient will show reduction in density to mild or less with improved contour of limb to allow for cosmesis, LE symmetry, infection risk reduction, and clothing and compression choice.   (progressing, not met)  3. Patient to krystal/doff compression garment with daily compliance to assist in lymphedema management, skin elasticity, and tissue density.  (progressing, not met)  4. Pt to show improved postural awareness and alignment.  (progressing, not met)  5. Pt to be I and compliant with HEP to allow for increased function in affected limb.   (progressing, not met)    Plan   Continue PT  2x   weekly for Complete Decongestive Therapy:  Manual lymphatic drainage, Multilayered short stretch bandaging, Pneumatic compression, Therapeutic exercises, Patient education as deemed necessary to achieve stated goals.      Luisa Thompson, PT

## 2023-12-19 ENCOUNTER — CLINICAL SUPPORT (OUTPATIENT)
Dept: REHABILITATION | Facility: HOSPITAL | Age: 68
End: 2023-12-19
Payer: MEDICARE

## 2023-12-19 DIAGNOSIS — N18.30 TYPE 2 DIABETES MELLITUS WITH STAGE 3 CHRONIC KIDNEY DISEASE, WITH LONG-TERM CURRENT USE OF INSULIN, UNSPECIFIED WHETHER STAGE 3A OR 3B CKD: ICD-10-CM

## 2023-12-19 DIAGNOSIS — E11.22 TYPE 2 DIABETES MELLITUS WITH STAGE 3 CHRONIC KIDNEY DISEASE, WITH LONG-TERM CURRENT USE OF INSULIN, UNSPECIFIED WHETHER STAGE 3A OR 3B CKD: ICD-10-CM

## 2023-12-19 DIAGNOSIS — I87.2 VENOUS INSUFFICIENCY OF BOTH LOWER EXTREMITIES: Primary | ICD-10-CM

## 2023-12-19 DIAGNOSIS — Z79.4 TYPE 2 DIABETES MELLITUS WITH STAGE 3 CHRONIC KIDNEY DISEASE, WITH LONG-TERM CURRENT USE OF INSULIN, UNSPECIFIED WHETHER STAGE 3A OR 3B CKD: ICD-10-CM

## 2023-12-19 PROCEDURE — 97140 MANUAL THERAPY 1/> REGIONS: CPT

## 2023-12-19 NOTE — PROGRESS NOTES
Physical Therapy Daily Treatment Note     Name: Jian JASMINE [facility] Number: [id number]    Therapy Diagnosis:   Encounter Diagnoses   Name Primary?    Venous insufficiency of both lower extremities Yes    Type 2 diabetes mellitus with stage 3 chronic kidney disease, with long-term current use of insulin, unspecified whether stage 3a or 3b CKD          Physician: Savanah Felton DPM    Visit Date: 12/19/2023    Physician: Savanah Felton DPM  Physician Orders: PT Eval and Treat - lymphedema  Medical Diagnosis from Referral: I89.0 (ICD-10-CM) - Lymphedema of both lower extremities    L03.119 (ICD-10-CM) - Cellulitis of lower extremity, unspecified laterality  Evaluation Date: 7/31/2023  Authorization Period Expiration: 12/31/2023  Plan of Care Expiration: 1/5/2023  Visit # / Visits authorized: 21/ 30     Time In: 9:00am  Time Out:10:00am  Total Billable Time: 60 minutes      Precautions: Standard and Diabetes    Subjective     Pt reports: has been doing well with velcro wraps   He was compliant with home exercise program.  Response to previous treatment: good response.  Functional change: none    Pain: 0/10  Location: bilateral lower legs     Objective     Pt presents Inelastic Velcro wraps donned BLEs     Treatment:   Jian received the following manual therapy techniques:- Manual Lymphatic Drainage were applied to the: BLE for 60 minutes, including: MLD and short stretch compression bandaging       MANUAL LYMPHATIC DRAINAGE (MLD):    While supine with LEs elevated stimulation at terminus, along GI region, B inguinal regions, drainage of entire BLE betina lower leg, ankle, and foot with return proximally,  Use of Aquaphor due to dryness.   Educated in self massage to abdominal areas, B inguinal areas, thigh, and remaining LE within reach.      MULTILAYERED BANDAGING: velcro wraps applied by PT on BLEs   Pt presents with small blister on L shin. Hole cut in telfa to disperse pressure and gauze added on top   Issued  tubigrip size G for BLE velcro wrap liner       Home Exercises Provided and Patient Education Provided     Self care/home management :   - Pt brings compreflex XL velcro wraps   - Donned tubigrip with double fold on foot and ankle . Donned velcro wrap to RLE over tubigrip with education on donning/doffing and proper fit . Pt self applied tubigrip and velcro wrap to LLE with therapist guidance.   - Advised to apply in the morning and can remove before bed .     Education provided:    Remove bandages if painful  PATIENT/FAMILY Education: bandaging wear schedule,  HEP,  Beginning of self massage,  Self or assisted bandaging, compression options, and Risk reduction    Written Home Exercises Provided: Patient instructed to cont prior HEP.  Exercises were reviewed and Jian was able to demonstrate them prior to the end of the session.  Jian demonstrated good  understanding of the education provided.       Assessment     Pt presents with small blister on L shin. Blister was covered with telfa with hole in middle to disperse pressure. MLD was performed on both legs to decreased swelling and improve skin integrity which pt tolerated well. Will continue to progress .     Jian Is progressing well towards his goals.   Pt prognosis is Fair.     Pt will continue to benefit from skilled outpatient physical therapy to address the deficits listed in the problem list box on initial evaluation, provide pt/family education and to maximize pt's level of independence in the home and community environment.   Pt's spiritual, cultural and educational needs considered and pt agreeable to plan of care and goals.     Anticipated barriers to physical therapy: none    Goals:        Short Term Goals: (6 weeks)  1. Patient will show decreased girth in B LE by up to 1 cm to allow for LE symmetry, shoe and clothing choice, and ability to apply needed compression.  (progressing, not met)   2. Patient will demonstrate 100% knowledge of lymphedema  precautions and signs of infection to allow for reduced lymphedema risk, infection risk, and/or exacerbation of condition.  (progressing, not met)  3. Patient or caregiver will perform self-bandaging techniques and/or wearing of compression garments to allow for lymphatic drainage support, skin elasticity, and reduction in shape and size of limb. (progressing, not met)  4. Patient will perform self lymph drainage techniques to areas within reach to enhance lymphatic drainage and skin condition.  (progressing, not met)  5. Patient will tolerate daily activities with multilayered bandaging to allow for lymphatic and venous support.  (progressing, not met)     Long Term Goals: (12  weeks)  1. Patient will show decreased girth in B LE by up to 2 cm  to allow for LE symmetry, shoe and clothing choice, and ability to apply needed compression daily.  (progressing, not met)  2. Patient will show reduction in density to mild or less with improved contour of limb to allow for cosmesis, LE symmetry, infection risk reduction, and clothing and compression choice.   (progressing, not met)  3. Patient to krystal/doff compression garment with daily compliance to assist in lymphedema management, skin elasticity, and tissue density.  (progressing, not met)  4. Pt to show improved postural awareness and alignment.  (progressing, not met)  5. Pt to be I and compliant with HEP to allow for increased function in affected limb.   (progressing, not met)    Plan   Continue PT  2x   weekly for Complete Decongestive Therapy:  Manual lymphatic drainage, Multilayered short stretch bandaging, Pneumatic compression, Therapeutic exercises, Patient education as deemed necessary to achieve stated goals.      Luisa Thompson, PT

## 2023-12-21 ENCOUNTER — CLINICAL SUPPORT (OUTPATIENT)
Dept: REHABILITATION | Facility: HOSPITAL | Age: 68
End: 2023-12-21
Payer: MEDICARE

## 2023-12-21 DIAGNOSIS — Z79.4 TYPE 2 DIABETES MELLITUS WITH STAGE 3 CHRONIC KIDNEY DISEASE, WITH LONG-TERM CURRENT USE OF INSULIN, UNSPECIFIED WHETHER STAGE 3A OR 3B CKD: ICD-10-CM

## 2023-12-21 DIAGNOSIS — I87.2 VENOUS INSUFFICIENCY OF BOTH LOWER EXTREMITIES: Primary | ICD-10-CM

## 2023-12-21 DIAGNOSIS — N18.30 TYPE 2 DIABETES MELLITUS WITH STAGE 3 CHRONIC KIDNEY DISEASE, WITH LONG-TERM CURRENT USE OF INSULIN, UNSPECIFIED WHETHER STAGE 3A OR 3B CKD: ICD-10-CM

## 2023-12-21 DIAGNOSIS — E11.22 TYPE 2 DIABETES MELLITUS WITH STAGE 3 CHRONIC KIDNEY DISEASE, WITH LONG-TERM CURRENT USE OF INSULIN, UNSPECIFIED WHETHER STAGE 3A OR 3B CKD: ICD-10-CM

## 2023-12-21 PROCEDURE — 97140 MANUAL THERAPY 1/> REGIONS: CPT

## 2023-12-21 NOTE — PROGRESS NOTES
See evaluation in POC for goals and assessment     Eval Date: 12/22/2023    Luisa Thompson, PT, DPT, CLT

## 2023-12-22 NOTE — PLAN OF CARE
Physical Therapy Daily Treatment Note/ POC Update     Name: Jian JASMINE [facility]  Clinic Number: [id number]    Therapy Diagnosis:   Encounter Diagnoses   Name Primary?    Venous insufficiency of both lower extremities Yes    Type 2 diabetes mellitus with stage 3 chronic kidney disease, with long-term current use of insulin, unspecified whether stage 3a or 3b CKD          Physician: Savanah Felton DPM    Visit Date: 12/21/2023    Physician: Savanah Felton DPM  Physician Orders: PT Eval and Treat - lymphedema  Medical Diagnosis from Referral: I89.0 (ICD-10-CM) - Lymphedema of both lower extremities    L03.119 (ICD-10-CM) - Cellulitis of lower extremity, unspecified laterality  Evaluation Date: 7/31/2023  Authorization Period Expiration: 12/31/2023  Plan of Care Expiration: 1/22/2024  Visit # / Visits authorized: 22/ 30     Time In: 9:00am  Time Out:10:00am  Total Billable Time: 60 minutes      Precautions: Standard and Diabetes    Subjective     Pt reports: he has been continuing to wear his velcro wraps 24/7  He was compliant with home exercise program.  Response to previous treatment: good response.  Functional change: none    Pain: 0/10  Location: bilateral lower legs     Objective     Pt presents Inelastic Velcro wraps donned BLEs \    12/21/2023        Treatment:   Jian received the following manual therapy techniques:- Manual Lymphatic Drainage were applied to the: BLE for 60 minutes, including: MLD and short stretch compression bandaging       MANUAL LYMPHATIC DRAINAGE (MLD):    While supine with LEs elevated stimulation at terminus, along GI region, B inguinal regions, drainage of entire BLE betina lower leg, ankle, and foot with return proximally,  Use of Aquaphor due to dryness.   Educated in self massage to abdominal areas, B inguinal areas, thigh, and remaining LE within reach.      MULTILAYERED BANDAGING: velcro wraps applied by PT on BLEs         Home Exercises Provided and Patient Education Provided      Self care/home management :   - Pt brings compreflex XL velcro wraps   - Donned tubigrip with double fold on foot and ankle . Donned velcro wrap to RLE over tubigrip with education on donning/doffing and proper fit . Pt self applied tubigrip and velcro wrap to LLE with therapist guidance.   - Advised to apply in the morning and can remove before bed .     Education provided:    Remove bandages if painful  PATIENT/FAMILY Education: bandaging wear schedule,  HEP,  Beginning of self massage,  Self or assisted bandaging, compression options, and Risk reduction    Written Home Exercises Provided: Patient instructed to cont prior HEP.  Exercises were reviewed and Jian was able to demonstrate them prior to the end of the session.  Jian demonstrated good  understanding of the education provided.       Assessment     Measurements show excellent progress in reductions in edema. Pt has been complaint with the use of compression and would benefit from an extension in plan of care to prepare pt for home management and make any further reductions to edema. Requesting extension to 1/22/2024    Jian Is progressing well towards his goals.   Pt prognosis is Fair.     Pt will continue to benefit from skilled outpatient physical therapy to address the deficits listed in the problem list box on initial evaluation, provide pt/family education and to maximize pt's level of independence in the home and community environment.   Pt's spiritual, cultural and educational needs considered and pt agreeable to plan of care and goals.     Anticipated barriers to physical therapy: none    Goals:        Short Term Goals: (6 weeks)  1. Patient will show decreased girth in B LE by up to 1 cm to allow for LE symmetry, shoe and clothing choice, and ability to apply needed compression.  MET  2. Patient will demonstrate 100% knowledge of lymphedema precautions and signs of infection to allow for reduced lymphedema risk, infection risk, and/or  exacerbation of condition.  MET  3. Patient or caregiver will perform self-bandaging techniques and/or wearing of compression garments to allow for lymphatic drainage support, skin elasticity, and reduction in shape and size of limb. MET  4. Patient will perform self lymph drainage techniques to areas within reach to enhance lymphatic drainage and skin condition.  (progressing, not met)  5. Patient will tolerate daily activities with multilayered bandaging to allow for lymphatic and venous support.  (progressing, not met)     Long Term Goals: (12  weeks)  1. Patient will show decreased girth in B LE by up to 2 cm  to allow for LE symmetry, shoe and clothing choice, and ability to apply needed compression daily.  MET  2. Patient will show reduction in density to mild or less with improved contour of limb to allow for cosmesis, LE symmetry, infection risk reduction, and clothing and compression choice. MET   3. Patient to krystal/doff compression garment with daily compliance to assist in lymphedema management, skin elasticity, and tissue density.  (progressing, not met)  4. Pt to show improved postural awareness and alignment.  (progressing, not met)  5. Pt to be I and compliant with HEP to allow for increased function in affected limb.   (progressing, not met)    Plan   Requesting extension to 1/22/2024 to make further reductions and prepare pt for home management.       Luisa Thompson, PT

## 2023-12-28 ENCOUNTER — LAB VISIT (OUTPATIENT)
Dept: LAB | Facility: HOSPITAL | Age: 68
End: 2023-12-28
Attending: INTERNAL MEDICINE
Payer: MEDICARE

## 2023-12-28 DIAGNOSIS — R80.9 PROTEINURIA, UNSPECIFIED TYPE: ICD-10-CM

## 2023-12-28 DIAGNOSIS — R31.9 HEMATURIA, UNSPECIFIED TYPE: ICD-10-CM

## 2023-12-28 LAB
ALBUMIN/CREAT UR: 5273.3 UG/MG (ref 0–30)
CREAT UR-MCNC: 90 MG/DL (ref 23–375)
CREAT UR-MCNC: 90 MG/DL (ref 23–375)
MICROALBUMIN UR DL<=1MG/L-MCNC: 4746 UG/ML
PROT UR-MCNC: 705 MG/DL (ref 0–15)
PROT/CREAT UR: 7.83 MG/G{CREAT} (ref 0–0.2)

## 2023-12-28 PROCEDURE — 82043 UR ALBUMIN QUANTITATIVE: CPT | Performed by: INTERNAL MEDICINE

## 2023-12-28 PROCEDURE — 87086 URINE CULTURE/COLONY COUNT: CPT | Performed by: INTERNAL MEDICINE

## 2023-12-28 PROCEDURE — 84156 ASSAY OF PROTEIN URINE: CPT | Performed by: INTERNAL MEDICINE

## 2023-12-29 LAB — BACTERIA UR CULT: NORMAL

## 2024-01-01 NOTE — PROGRESS NOTES
[facility]  Gastroenterology  Progress Note    Patient Name: Jian Arrieta  MRN: [id number]  Admission Date: 6/27/2019  Hospital Length of Stay: 10 days  Code Status: Full Code   Attending Provider: SON Rowe MD  Consulting Provider: Loy Mitchell MD  Primary Care Physician: Leon Barajas DO  Principal Problem: Portal hypertension      Subjective:     Interval History: Feels ok, no melena. Sophy diet. No abdominal pain currently.     Review of Systems   Constitutional: Negative for appetite change and unexpected weight change.   Respiratory: Negative for apnea and chest tightness.    Cardiovascular: Negative for chest pain and palpitations.   Gastrointestinal: Negative for abdominal distention and abdominal pain.     Objective:     Vital Signs (Most Recent):  Temp: 98.1 °F (36.7 °C) (07/07/19 1102)  Pulse: 104 (07/07/19 1402)  Resp: 12 (07/07/19 1402)  BP: 110/61 (07/07/19 1402)  SpO2: 100 % (07/07/19 1402) Vital Signs (24h Range):  Temp:  [98 °F (36.7 °C)-98.4 °F (36.9 °C)] 98.1 °F (36.7 °C)  Pulse:  [101-127] 104  Resp:  [5-38] 12  SpO2:  [89 %-100 %] 100 %  BP: ()/(49-81) 110/61     Weight: 120.1 kg (264 lb 12.4 oz) (07/07/19 0600)  Body mass index is 41.47 kg/m².      Intake/Output Summary (Last 24 hours) at 7/7/2019 1413  Last data filed at 7/7/2019 1402  Gross per 24 hour   Intake 3838.97 ml   Output 2610 ml   Net 1228.97 ml       Lines/Drains/Airways     Peripherally Inserted Central Catheter Line                 PICC Double Lumen 05/17/19 1717  50 days          Drain                 Urethral Catheter 07/03/19 1753 Latex 14 Fr. 3 days          Peripheral Intravenous Line                 Peripheral IV - Single Lumen 07/06/19 1100 20 G Right Antecubital 1 day                Physical Exam   Constitutional: He is oriented to person, place, and time. He appears well-developed and well-nourished. No distress.   HENT:   Head: Normocephalic.   Eyes: Conjunctivae are normal. No  scleral icterus.   Cardiovascular: Normal rate, regular rhythm and normal heart sounds. Exam reveals no gallop and no friction rub.   Pulmonary/Chest: Effort normal and breath sounds normal. He has no wheezes. He has no rales.   Abdominal: Soft. Bowel sounds are normal. He exhibits distension. There is no tenderness. There is no rebound and no guarding.   Musculoskeletal: Normal range of motion. He exhibits edema. He exhibits no tenderness.   Neurological: He is alert and oriented to person, place, and time.   Skin: He is not diaphoretic.   Psychiatric: He has a normal mood and affect. His behavior is normal.   Nursing note and vitals reviewed.      Significant Labs:  CBC:   Recent Labs   Lab 07/06/19  0610 07/07/19  0607   WBC 4.85 5.46   HGB 6.7* 8.2*   HCT 21.6* 25.9*    196         Significant Imaging:  Imaging results within the past 24 hours have been reviewed.    Assessment/Plan:     Anemia  - egd tomorrow for anemia and screening for varices  - monitor hct  - no overt bleeding    Other ascites  -suspect related to chronic underlying liver disease  - continue current management, paracenteses as needed        Thank you for your consult. I will follow-up with patient. Please contact us if you have any additional questions.    Loy Mitchell MD  Gastroenterology  [facility]   hard copy, drawn during this pregnancy

## 2024-01-01 NOTE — ANESTHESIA POSTPROCEDURE EVALUATION
Anesthesia Post Evaluation    Patient: Jian Arrieta    Procedure(s) Performed: Procedure(s) (LRB):  CYSTOSCOPY (N/A)  URETEROSCOPY (Right)  REMOVAL-STENT (Right)  PYELOGRAM, RETROGRADE (Right)  EXTRACTION - STONE (Right)  PYELOSCOPY (Right)    Final Anesthesia Type: general      Patient location during evaluation: PACU  Patient participation: Yes- Able to Participate  Level of consciousness: awake and alert and oriented  Post-procedure vital signs: reviewed and stable  Pain management: adequate  Airway patency: patent    PONV status at discharge: No PONV  Anesthetic complications: no      Cardiovascular status: blood pressure returned to baseline  Respiratory status: unassisted, room air and spontaneous ventilation  Hydration status: euvolemic  Follow-up not needed.              Vitals Value Taken Time   /77 12/06/23 0946   Temp 36.4 °C (97.5 °F) 12/06/23 0921   Pulse 94 12/06/23 0950   Resp 20 12/06/23 0950   SpO2 98 % 12/06/23 0950   Vitals shown include unvalidated device data.      No case tracking events are documented in the log.      Pain/Jason Score: Pain Rating Prior to Med Admin: 6 (12/6/2023  9:39 AM)  Jason Score: 10 (12/6/2023  9:35 AM)          
N/A Patient is under age 18 and does not have a history of high risk behavior or is not high risk for Hep C

## 2024-01-02 NOTE — PROGRESS NOTES
HPI  This 68 y.o. y/o male with PMH of type 2 DM, HTN, HLD, hx of HFrEF&HFpEF (most recent echo showed normal EF and no diastolic dysfunction in 2023), portal vein thrombosis with mild cirrhosis & chronic pancreatitis c/b ascites s/p venoplasty, CAD s/p PCI x5 on Plavix with subsequent CABG, right proximal ureteral stone s/p right ureteroscopy with laser lithotripsy, cystoscopy, right JJ ureteral stent exchange in 2021, now removed, right hydronephrosis s/p ureteral stent, removed Dec 6/2023.    Intermittent chest pain likely 2/2 uncontrolled HTN. The patient states he is in the process of seeing a Cardiologist.    Renal history  Creatinine   Date Value Ref Range Status   12/28/2023 2.7 (H) 0.5 - 1.4 mg/dL Final   11/29/2023 3.0 (H) 0.5 - 1.4 mg/dL Final   11/08/2023 2.8 (H) 0.5 - 1.4 mg/dL Final   10/30/2023 2.6 (H) 0.5 - 1.4 mg/dL Final   10/17/2023 3.1 (H) 0.5 - 1.4 mg/dL Final   10/16/2023 3.5 (H) 0.5 - 1.4 mg/dL Final   10/16/2023 3.5 (H) 0.5 - 1.4 mg/dL Final   10/15/2023 3.8 (H) 0.5 - 1.4 mg/dL Final   10/14/2023 3.9 (H) 0.5 - 1.4 mg/dL Final   10/14/2023 3.7 (H) 0.5 - 1.4 mg/dL Final   Current Cr 2.7-3.0  Cr trended up to 2.4 then 4.7->hospitalized in Oct 2023, s/p treatment of DKA (insulin + fluid) and right ureteral stent via ureteroscopy (CT Mild right hydronephrosis and right hydroureter with gradual tapering without evidence for ureteral calculus) 10/2023-> Cr trended down to 3.1; epididymitis s/p abx  Cr 1.8-2.2 2020-early 2023  Cr up to 3.5 in 2020, then down to 1.8-2.0 in 03/2020, RUQ pain, s/p Cholecystostomy tube placement   Cr up to 4.9 in 2019, then down to 1.4 02/2020; hospitalization for large amount ascites removal s/p venoplasty   Cr 1.1-1.3 prior to 2019  Prot/Creat Ratio, Urine   Date Value Ref Range Status   12/28/2023 7.83 (H) 0.00 - 0.20 Final   11/29/2023 3.76 (H) 0.00 - 0.20 Final   Urine protein 2+ started 2019  Hep B/C, SPEP, SFLC neg; CAROLYNN, ANCA, C3, C4 neg, HIV, RPR neg. Mild  elevated IgG noted.  Hx of BPH with obstruction from Urology's note; on tamsulosin, NM Renogram With Lasix was ordered.  Has been drinking 50 oz of fluid a day  Not taking NSAIDs    Renal stone  Multiple episodes+ > 20  Right proximal ureteral stone s/p right ureteroscopy with laser lithotripsy, cystoscopy, right JJ ureteral stent exchange in 2021. Stone analysis: 80% Uric acid, 20% Calcium oxalate monohydrate.  S/p uroscopy on 12/6 : Stones were fragmented into dust on basket extraction; Retrograde pyelogram showed delicate calices with no evidence of hydronephrosis    HTN  193/91 mmHg  Current medication: amlodipine 10 mg, hydralazine 25 mg TID, and torsemide 20 mg daily; however, he ran out of the medications for 2 months (he states feeling well, so he stopped taking his meds when ran out)  Trying to be compliant with low salt diet  Weight stable since hospitalization    Type 2 DM  Lab Results   Component Value Date    HGBA1C 7.7 (H) 10/30/2023   No DM retinopathy (correct from prior chart)  Has been poorly controlled (A1c 11 1 year ago)  With insulin therapy    10/2023 abdominal scan  Nonobstructing calcification at the midpole of the left kidney is again noted.  There is mild perinephric stranding bilaterally, this is mildly more prominent than the prior study and is nonspecific, correlation for renal disease to include UTI/pyelonephritis is needed.  On the left there is no evidence for ureteral calculus or obstructive uropathy.  On the right there is mild right hydronephrosis and mild right hydroureter with tapering of the right ureter, there is no ureteral calculus seen.  These findings may relate to sequelae of recently passed calculus, clinical and historical correlation is needed.  Mild urinary bladder wall thickening may relate to incomplete distention however correlation for UTI/cystitis is needed.    Impression:     Mild right hydronephrosis and right hydroureter with gradual tapering without evidence  for ureteral calculus, these findings may relate to sequelae of recently passed calculus, clinical and historical correlation is needed.     Mild perinephric stranding bilaterally, nonspecific however correlation for UTI/pyelonephritis is needed.     Mild urinary bladder wall thickening may relate to incomplete distention however correlation for UTI/cystitis is needed.    RP US 11/2023  FINDINGS:  Images are degraded secondary to patient positioning and adjacent bowel gas.     Right kidney: The right kidney measures 8.8 cm. No cortical thinning. There is loss of corticomedullary distinction. Resistive index measures 0.83.  No mass. No renal stone. No hydronephrosis.     Left kidney: The left kidney measures 9.9 cm. No cortical thinning. There is loss of corticomedullary distinction. Resistive index measures 0.76.  Junctional defect present which is a normal variant.  No mass. 1.0 cm nonobstructing stone in the midpole.  No hydronephrosis.     The bladder is partially distended at the time of scanning and has an unremarkable appearance.     Hyperechoic area within the liver consistent with artifact seen on CT from 10/12/2023.     Impression:     No hydronephrosis visualized noting images are degraded secondary to patient positioning and adjacent bowel gas.     Findings suggestive of medical renal disease.        Past Medical History:   Diagnosis Date    Alcohol abuse     Anasarca 1/28/2019    Anemia     Anticoagulant long-term use     Arthropathy associated with neurological disorder 9/2/2015    Atherosclerosis     Charcot foot due to diabetes mellitus     Chronic combined systolic and diastolic heart failure 01/29/2019 1-28-19 Left VentricleModerate decreased ejection fraction at 30%. Normal left ventricular cavity size. Normal wall thickness observed. Grade I (mild) left ventricular diastolic dysfunction consistent with impaired relaxation. Normal left atrial pressure. Moderate, global hypokinesis(see wall  scoring diagram). Right VentricleNormal cavity size, wall thickness and ejection fraction. Wall motion n    Chronic pancreatitis 1/28/2019    CKD (chronic kidney disease) stage 3, GFR 30-59 ml/min     CKD (chronic kidney disease) stage 4, GFR 15-29 ml/min     Colon polyps     approx 5 yrs ago    Coronary artery disease due to calcified coronary lesion 05/08/2015    5 stents on ASA      Diabetic polyneuropathy associated with type 2 diabetes mellitus 9/2/2015    Diverticulosis 1/28/2019    DM type 2 with diabetic peripheral neuropathy 2/4/2019    Encounter for blood transfusion     Essential hypertension 1/28/2019    Former smoker 8/26/2015    Healed ulcer of left foot on examination 6/20/2017    Hematuria     Hydrocele     approx 1.5 yrs ago    Hyperphosphatemia     Hypoalbuminemia 2/4/2019    Hypocalcemia     Lymphedema of both lower extremities 1/29/2019    Mixed hyperlipidemia 5/8/2015    Morbid obesity with BMI of 50.0-59.9, adult 5/8/2015    Obstruction of right ureteropelvic junction (UPJ) due to stone 5/24/2021    Onychomycosis of multiple toenails with type 2 diabetes mellitus and peripheral neuropathy 6/20/2017    Perianal cyst     approx 2 yrs ago    Proteinuria     Pseudocyst of pancreas 1/28/2019 1-28-19 Liver has a cirrhotic morphology with no focal lesions.  Significant interval increase in ascites when compared to prior exam which may account for patient's abdominal distension.  Hypodense air-fluid collection along the body of the pancreas which is slightly smaller when compared to prior CT.  Findings may relate to pancreatic necrosis with pancreatic pseudocysts with infected pseudocyst    Skin cancer     skin cancer    Sleep apnea 8/26/2015    Status post bariatric surgery 1/11/2016    Type 2 diabetes mellitus, with long-term current use of insulin 5/8/2015    Urinary tract infection        Past Surgical History:   Procedure Laterality Date    ANGIOGRAPHY N/A 6/28/2019    Procedure: ANGIOGRAM-PV  STENT;  Surgeon: [facility] Diagnostic Provider;  Location: [facility] OR;  Service: Radiology;  Laterality: N/A;    ANGIOPLASTY      total x5 stents    COLONOSCOPY N/A 10/6/2015    Procedure: COLONOSCOPY;  Surgeon: Shekhar Richards MD;  Location: [facility] ENDO (2ND FLR);  Service: Endoscopy;  Laterality: N/A;  BMI over 55/2nd floor case    5 day hold Plavix, Dr Kwadwo Arroyo    COLONOSCOPY N/A 5/13/2021    Procedure: COLONOSCOPY;  Surgeon: Huan Brumfield MD;  Location: [facility] ENDO;  Service: Endoscopy;  Laterality: N/A;    CORONARY ANGIOGRAPHY Right 3/20/2019    Procedure: ANGIOGRAM, CORONARY ARTERY;  Surgeon: Bob Duque MD;  Location: [facility] CATH LAB;  Service: Cardiology;  Laterality: Right;    CORONARY ARTERY BYPASS GRAFT  2017    x3    CORONARY BYPASS GRAFT ANGIOGRAPHY  3/20/2019    Procedure: Bypass graft study;  Surgeon: Bob Duque MD;  Location: [facility] CATH LAB;  Service: Cardiology;;    CYST REMOVAL      CYSTOSCOPY Right 6/30/2021    Procedure: CYSTOSCOPY;  Surgeon: William Diaz MD;  Location: [facility] OR 1ST FLR;  Service: Urology;  Laterality: Right;    CYSTOSCOPY N/A 10/16/2023    Procedure: CYSTOSCOPY;  Surgeon: Chrystal Dias MD;  Location: [facility] OR 1ST FLR;  Service: Urology;  Laterality: N/A;    CYSTOSCOPY N/A 12/6/2023    Procedure: CYSTOSCOPY;  Surgeon: William Diaz MD;  Location: [facility] OR 1ST FLR;  Service: Urology;  Laterality: N/A;    CYSTOSCOPY W/ URETERAL STENT PLACEMENT Right 6/16/2021    Procedure: CYSTOSCOPY, WITH URETERAL STENT INSERTION;  Surgeon: William Diaz MD;  Location: [facility] OR 2ND FLR;  Service: Urology;  Laterality: Right;  FLUORO LESS THAN 1 HOUR    ENDOSCOPIC ULTRASOUND OF UPPER GASTROINTESTINAL TRACT N/A 2/26/2020    Procedure: ULTRASOUND, UPPER GI TRACT, ENDOSCOPIC;  Surgeon: Robbie Yang MD;  Location: [facility] ENDO;  Service: Endoscopy;  Laterality: N/A;    ESOPHAGOGASTRODUODENOSCOPY N/A 7/8/2019    Procedure: ESOPHAGOGASTRODUODENOSCOPY (EGD);  Surgeon: Huan Brumfield,  MD;  Location: [facility] ENDO;  Service: Endoscopy;  Laterality: N/A;    ESOPHAGOGASTRODUODENOSCOPY N/A 5/13/2021    Procedure: EGD (ESOPHAGOGASTRODUODENOSCOPY);  Surgeon: Huan Brumfield MD;  Location: [facility] ENDO;  Service: Endoscopy;  Laterality: N/A;    EXCISION OF HYDROCELE Bilateral 6/16/2021    Procedure: HYDROCELE REPAIR;  Surgeon: William Diaz MD;  Location: [facility] OR 2ND FLR;  Service: Urology;  Laterality: Bilateral;  2 HOURS    EXTRACTION - STONE Right 12/6/2023    Procedure: EXTRACTION - STONE;  Surgeon: William Diaz MD;  Location: [facility] OR 1ST FLR;  Service: Urology;  Laterality: Right;    FLUOROSCOPY N/A 6/16/2021    Procedure: FLUOROSCOPY;  Surgeon: William Diaz MD;  Location: [facility] OR 2ND FLR;  Service: Urology;  Laterality: N/A;    GASTRECTOMY      INSERTION OF DIALYSIS CATHETER N/A 5/17/2019    Procedure: pleurx;  Surgeon: [facility] Diagnostic Provider;  Location: [facility] OR 2ND FLR;  Service: General;  Laterality: N/A;  Room [facility]    KNEE ARTHROSCOPY      LAPAROSCOPIC CHOLECYSTECTOMY N/A 5/4/2020    Procedure: CHOLECYSTECTOMY, LAPAROSCOPIC;  Surgeon: SON Rowe MD;  Location: [facility] OR;  Service: General;  Laterality: N/A;    LASER LITHOTRIPSY N/A 6/30/2021    Procedure: LITHOTRIPSY, USING LASER;  Surgeon: William Diaz MD;  Location: [facility] OR 1ST FLR;  Service: Urology;  Laterality: N/A;    LIVER BIOPSY N/A 5/4/2020    Procedure: BIOPSY, LIVER, Laproscopic ;  Surgeon: SON Rowe MD;  Location: [facility] OR;  Service: General;  Laterality: N/A;    perianal surgery      perianal cyst removed    PYELOSCOPY Right 6/30/2021    Procedure: PYELOSCOPY;  Surgeon: William Diaz MD;  Location: [facility] OR 1ST FLR;  Service: Urology;  Laterality: Right;    PYELOSCOPY Right 10/16/2023    Procedure: PYELOSCOPY;  Surgeon: Chrystal Dias MD;  Location: [facility] OR 1ST FLR;  Service: Urology;  Laterality: Right;    PYELOSCOPY Right 12/6/2023    Procedure: PYELOSCOPY;  Surgeon: William Diaz MD;   Location: [facility] OR 1ST FLR;  Service: Urology;  Laterality: Right;    REMOVAL-STENT Right 2023    Procedure: REMOVAL-STENT;  Surgeon: William Diaz MD;  Location: [facility] OR 1ST FLR;  Service: Urology;  Laterality: Right;    REPLACEMENT OF STENT Right 2021    Procedure: REPLACEMENT, STENT;  Surgeon: William Diaz MD;  Location: [facility] OR 1ST FLR;  Service: Urology;  Laterality: Right;    RETROGRADE PYELOGRAPHY Right 10/16/2023    Procedure: PYELOGRAM, RETROGRADE;  Surgeon: Chrystal Dias MD;  Location: [facility] OR 1ST FLR;  Service: Urology;  Laterality: Right;    RETROGRADE PYELOGRAPHY Right 2023    Procedure: PYELOGRAM, RETROGRADE;  Surgeon: William Diaz MD;  Location: [facility] OR 1ST FLR;  Service: Urology;  Laterality: Right;    URETERAL STENT PLACEMENT Right 10/16/2023    Procedure: INSERTION, STENT, URETER;  Surgeon: Chrystal Dias MD;  Location: [facility] OR 1ST FLR;  Service: Urology;  Laterality: Right;    URETEROSCOPY Right 2021    Procedure: URETEROSCOPY FLEXIBLE URETEROSCOPE;  Surgeon: William Diaz MD;  [facility];  Service: Urology;  Laterality: Right;    URETEROSCOPY Right 10/16/2023    Procedure: URETEROSCOPY;  Surgeon: Chrystal Dias MD;  Location: [facility] OR [facility] FLR;  Service: Urology;  Laterality: Right;    URETEROSCOPY Right 2023    Procedure: URETEROSCOPY;  Surgeon: William Diaz MD;  Location: [facility] OR 1ST FLR;  Service: Urology;  Laterality: Right;       Review of patient's allergies indicates:  No Known Allergies      Social History     Socioeconomic History    Marital status:    Tobacco Use    Smoking status: Former     Current packs/day: 0.00     Average packs/day: 2.0 packs/day for 30.0 years (60.0 ttl pk-yrs)     Types: Cigarettes     Start date: 1975     Quit date: 2005     Years since quittin.9    Smokeless tobacco: Never   Substance and Sexual Activity    Alcohol use: No     Comment: started ~, reports 1 shot  "daily, max 3 shots daily, vague about alcohol consumption. Last drink 9/2018    Drug use: No     Social Determinants of Health     Financial Resource Strain: Low Risk  (10/13/2023)    Overall Financial Resource Strain (CARDIA)     Difficulty of Paying Living Expenses: Not very hard   Physical Activity: Unknown (10/13/2023)    Exercise Vital Sign     Days of Exercise per Week: 7 days     Minutes of Exercise per Session: Patient declined   Stress: Patient Declined (10/13/2023)    [employer] - Occupational Stress Questionnaire     Feeling of Stress : Patient declined   Social Connections: Moderately Isolated (10/13/2023)    Social Connection and Isolation Panel [NHANES]     Frequency of Communication with Friends and Family: More than three times a week     Frequency of Social Gatherings with Friends and Family: More than three times a week     Attends Religion Services: Never     Active Member of Clubs or Organizations: No     Attends Club or Organization Meetings: Never     Marital Status:        Family History   Problem Relation Age of Onset    Cancer Mother     Cancer Father     Heart disease Father     Obesity Sister     Parkinsonism Brother     No Known Problems Paternal Grandmother     Cancer Paternal Grandfather     Cancer Brother     Diabetes Maternal Grandmother     Stroke Maternal Grandfather     Cirrhosis Neg Hx        ROS negative except listed above    PHYSICAL EXAMINATION:  Blood pressure (!) 193/91, pulse 83, resp. rate 18, height 5' 7" (1.702 m), weight 133.7 kg (294 lb 12.1 oz), SpO2 98 %.  Constitutional - No acute distress  HEENT - Grossly normal  Neck - supple.   Cardiovascular - JVP mild distended around 8 cm  Respiratory - Clear  Musculoskeletal - Peripheral edema 2+ (chronic edema for years)  Dermatologic/Skin - Skin warm and dry.  No rashes.    Neurologic - No acute neurological deficit  Psychiatric - AAOx3    Assessment and Plan  1. CKD stage 4        " Proteinuria    Urine Protein Creatinine Ratios:    Prot/Creat Ratio, Urine   Date Value Ref Range Status   12/28/2023 7.83 (H) 0.00 - 0.20 Final   11/29/2023 3.76 (H) 0.00 - 0.20 Final   11/08/2023 3.31 (H) 0.00 - 0.20 Final         Acid-Base:   Lab Results   Component Value Date     12/28/2023    K 5.6 (H) 12/28/2023    CO2 21 (L) 12/28/2023     -Etiology of CKD: multiple ELIEZER in the past (most recent one 2/2 DKA, obstructive uropathy), HTN, DM, hx of CHF  -Hypoalbuminemia is likely chronic due to underlying pancreatitis/PVT/cirrhosis rather than proteinuria. Hep B/C, SPEP, SFLC neg; CAROLYNN, ANCA, C3, C4 neg, HIV, RPR neg. Mild elevated IgG noted.  -Counseled to avoid NSAIDs  -Adequate fluid intake 45-50 oz a day  -Will consider ACEI/ARB next visit if the hyper K subsides (hydralazine may be switched off depending on the BPs). Can further add SGLT2 if eGFR still > 20 after maximizing ACEI/ARB.  -Counseled the patient to take baking soda half tea spoon two times a day  -Continues to follow with Urology for right hydroureter/hydronephrosis s/p stent    2. HTN: BP goal 130/80 mmHg  -Counseled for low salt diet  -Counseled the patient to check BP at home  -Amlodipine 10 mg, hydralazine 25 mg TID, torsemide 20 mg daily reordered  -Will consider switching hydralazine to ACEI/ARB next visit  -Counseled for medication compliance    3. Nephrolithiasis  Will hold the potassium citrate 10 meq TID due to hyperK. I called and spoke with him right after the visit.    4. Secondary hyperparathyroidism:   Lab Results   Component Value Date    .1 (H) 11/08/2023    CALCIUM 8.2 (L) 12/28/2023    CALCIUM 7.9 (L) 11/29/2023    CAION 1.13 03/15/2019    PHOS 4.4 11/08/2023    PHOS 4.0 09/28/2020     Vit D, 25-Hydroxy   Date Value Ref Range Status   11/08/2023 23 (L) 30 - 96 ng/mL Final     Comment:     Vitamin D deficiency.........<10 ng/mL                              Vitamin D insufficiency......10-29 ng/mL       Vitamin D  sufficiency........> or equal to 30 ng/mL  Vitamin D toxicity............>100 ng/mL      Will add vit D and calcitriol if persistent low vit D and Ca level noted    5. Anemia:   Lab Results   Component Value Date    HGB 12.1 (L) 12/28/2023    FERRITIN 124 11/08/2023    IRON 120 11/08/2023    TRANSFERRIN 205 11/08/2023    TIBC 303 11/08/2023    FESATURATED 40 11/08/2023   Will continue to monitor    6. Type 2 DM:  Last HbA1C   Lab Results   Component Value Date    HGBA1C 7.7 (H) 10/30/2023     Counseled the patient regarding the importance of sugar control to slow CKD progression     Follow up in 1 months

## 2024-01-04 ENCOUNTER — OFFICE VISIT (OUTPATIENT)
Dept: NEPHROLOGY | Facility: CLINIC | Age: 69
End: 2024-01-04
Payer: MEDICARE

## 2024-01-04 VITALS
HEIGHT: 67 IN | HEART RATE: 83 BPM | RESPIRATION RATE: 18 BRPM | WEIGHT: 294.75 LBS | SYSTOLIC BLOOD PRESSURE: 193 MMHG | DIASTOLIC BLOOD PRESSURE: 91 MMHG | OXYGEN SATURATION: 98 % | BODY MASS INDEX: 46.26 KG/M2

## 2024-01-04 DIAGNOSIS — R60.9 EDEMA, UNSPECIFIED TYPE: ICD-10-CM

## 2024-01-04 DIAGNOSIS — E87.20 METABOLIC ACIDOSIS: ICD-10-CM

## 2024-01-04 DIAGNOSIS — N25.81 SECONDARY HYPERPARATHYROIDISM: ICD-10-CM

## 2024-01-04 DIAGNOSIS — N18.4 CHRONIC KIDNEY DISEASE, STAGE 4 (SEVERE): Primary | ICD-10-CM

## 2024-01-04 DIAGNOSIS — E11.22 TYPE 2 DIABETES MELLITUS WITH STAGE 4 CHRONIC KIDNEY DISEASE, UNSPECIFIED WHETHER LONG TERM INSULIN USE: ICD-10-CM

## 2024-01-04 DIAGNOSIS — I10 HYPERTENSION, UNSPECIFIED TYPE: ICD-10-CM

## 2024-01-04 DIAGNOSIS — N18.4 TYPE 2 DIABETES MELLITUS WITH STAGE 4 CHRONIC KIDNEY DISEASE, UNSPECIFIED WHETHER LONG TERM INSULIN USE: ICD-10-CM

## 2024-01-04 PROCEDURE — 3288F FALL RISK ASSESSMENT DOCD: CPT | Mod: CPTII,S$GLB,, | Performed by: INTERNAL MEDICINE

## 2024-01-04 PROCEDURE — 1160F RVW MEDS BY RX/DR IN RCRD: CPT | Mod: CPTII,S$GLB,, | Performed by: INTERNAL MEDICINE

## 2024-01-04 PROCEDURE — 1126F AMNT PAIN NOTED NONE PRSNT: CPT | Mod: CPTII,S$GLB,, | Performed by: INTERNAL MEDICINE

## 2024-01-04 PROCEDURE — 3080F DIAST BP >= 90 MM HG: CPT | Mod: CPTII,S$GLB,, | Performed by: INTERNAL MEDICINE

## 2024-01-04 PROCEDURE — 3008F BODY MASS INDEX DOCD: CPT | Mod: CPTII,S$GLB,, | Performed by: INTERNAL MEDICINE

## 2024-01-04 PROCEDURE — 1101F PT FALLS ASSESS-DOCD LE1/YR: CPT | Mod: CPTII,S$GLB,, | Performed by: INTERNAL MEDICINE

## 2024-01-04 PROCEDURE — 3077F SYST BP >= 140 MM HG: CPT | Mod: CPTII,S$GLB,, | Performed by: INTERNAL MEDICINE

## 2024-01-04 PROCEDURE — 3066F NEPHROPATHY DOC TX: CPT | Mod: CPTII,S$GLB,, | Performed by: INTERNAL MEDICINE

## 2024-01-04 PROCEDURE — 99214 OFFICE O/P EST MOD 30 MIN: CPT | Mod: S$GLB,,, | Performed by: INTERNAL MEDICINE

## 2024-01-04 PROCEDURE — 1159F MED LIST DOCD IN RCRD: CPT | Mod: CPTII,S$GLB,, | Performed by: INTERNAL MEDICINE

## 2024-01-04 PROCEDURE — 99999 PR PBB SHADOW E&M-EST. PATIENT-LVL IV: CPT | Mod: PBBFAC,,, | Performed by: INTERNAL MEDICINE

## 2024-01-04 RX ORDER — AMLODIPINE BESYLATE 10 MG/1
10 TABLET ORAL DAILY
Qty: 90 TABLET | Refills: 1 | Status: SHIPPED | OUTPATIENT
Start: 2024-01-04 | End: 2024-07-02

## 2024-01-04 RX ORDER — HYDRALAZINE HYDROCHLORIDE 25 MG/1
25 TABLET, FILM COATED ORAL EVERY 8 HOURS
Qty: 270 TABLET | Refills: 1 | Status: SHIPPED | OUTPATIENT
Start: 2024-01-04 | End: 2024-07-02

## 2024-01-04 RX ORDER — POTASSIUM CITRATE 10 MEQ/1
10 TABLET, EXTENDED RELEASE ORAL
Qty: 270 TABLET | Refills: 3
Start: 2024-01-04 | End: 2024-01-04

## 2024-01-04 RX ORDER — TORSEMIDE 20 MG/1
20 TABLET ORAL DAILY
Qty: 90 TABLET | Refills: 1 | Status: SHIPPED | OUTPATIENT
Start: 2024-01-04 | End: 2024-07-02

## 2024-01-08 ENCOUNTER — CLINICAL SUPPORT (OUTPATIENT)
Dept: REHABILITATION | Facility: HOSPITAL | Age: 69
End: 2024-01-08
Payer: MEDICARE

## 2024-01-08 DIAGNOSIS — E11.22 TYPE 2 DIABETES MELLITUS WITH STAGE 3 CHRONIC KIDNEY DISEASE, WITH LONG-TERM CURRENT USE OF INSULIN, UNSPECIFIED WHETHER STAGE 3A OR 3B CKD: ICD-10-CM

## 2024-01-08 DIAGNOSIS — I87.2 VENOUS INSUFFICIENCY OF BOTH LOWER EXTREMITIES: Primary | ICD-10-CM

## 2024-01-08 DIAGNOSIS — Z79.4 TYPE 2 DIABETES MELLITUS WITH STAGE 3 CHRONIC KIDNEY DISEASE, WITH LONG-TERM CURRENT USE OF INSULIN, UNSPECIFIED WHETHER STAGE 3A OR 3B CKD: ICD-10-CM

## 2024-01-08 DIAGNOSIS — N18.30 TYPE 2 DIABETES MELLITUS WITH STAGE 3 CHRONIC KIDNEY DISEASE, WITH LONG-TERM CURRENT USE OF INSULIN, UNSPECIFIED WHETHER STAGE 3A OR 3B CKD: ICD-10-CM

## 2024-01-08 PROCEDURE — 97140 MANUAL THERAPY 1/> REGIONS: CPT

## 2024-01-08 NOTE — PROGRESS NOTES
Physical Therapy Daily Treatment Note     Name: Jian JASMINE [facility] Number: [id number]    Therapy Diagnosis:   Encounter Diagnoses   Name Primary?    Venous insufficiency of both lower extremities Yes    Type 2 diabetes mellitus with stage 3 chronic kidney disease, with long-term current use of insulin, unspecified whether stage 3a or 3b CKD      Physician: Savanah Felton DPM    Visit Date: 1/8/2024    Physician: Savanah Felton DPM  Physician Orders: PT Eval and Treat - lymphedema  Medical Diagnosis from Referral: I89.0 (ICD-10-CM) - Lymphedema of both lower extremities    L03.119 (ICD-10-CM) - Cellulitis of lower extremity, unspecified laterality  Evaluation Date: 7/31/2023  Authorization Period Expiration: 12/31/2023  Plan of Care Expiration: 1/22/2024  Visit # / Visits authorized: 2/20     Time In: 9:00am  Time Out:10:00am  Total Billable Time: 60 minutes      Precautions: Standard and Diabetes    Subjective     Pt reports: He has been doing ok but was not wearing his velcro wraps daily during the holidays   He was not compliant with home exercise program.  Response to previous treatment: fine, no issues   Functional change: has B knee high velcro wraps     Pain: 0/10  Location: bilateral lower legs     Objective     Pt arrives with B velcro wraps donned     Treatment:   Jian received the following manual therapy techniques:- Manual Lymphatic Drainage were applied to the: BLEs  for 60 minutes, including: MLD and short stretch compression bandaging       MANUAL LYMPHATIC DRAINAGE (MLD):    While supine with LEs elevated stimulation at terminus, along GI region, B inguinal regions, drainage of entire B LE betina lower leg, ankle, and foot with return proximally,  Use of Aquaphor due to dryness.   Educated in self massage to abdominal areas, B inguinal areas, thigh, and remaining LE within reach.        MULTILAYERED BANDAGING:  Velcro wraps donned on pt's BLEs, pt educated on importance of wearing compression  garments daily       Home Exercises Provided and Patient Education Provided     Education provided:      PATIENT/FAMILY Education: bandaging wear schedule,  HEP,  Beginning of self massage,  Self or assisted bandaging, compression options, and Risk reduction    Written Home Exercises Provided: Patient instructed to cont prior HEP.  Exercises were reviewed and Jian was able to demonstrate them prior to the end of the session.  Jian demonstrated good  understanding of the education provided.       Assessment     Pt presents with significant swelling of BLEs secondary to CKD. Pt's BLEs were massaged to drain fluid with excellent tolerance do far. Pt's velcro wraps were applied afterwards in lieu of bandages as pt can tighten velcro wraps and tolerate longer wear. Will continue to progress       Jian Is progressing well towards his goals.   Pt prognosis is Good.     Pt will continue to benefit from skilled outpatient physical therapy to address the deficits listed in the problem list box on initial evaluation, provide pt/family education and to maximize pt's level of independence in the home and community environment.     Pt's spiritual, cultural and educational needs considered and pt agreeable to plan of care and goals.     Anticipated barriers to physical therapy: none     Goals:     Goals:         Short Term Goals: (6 weeks)  1. Patient will show decreased girth in B LE by up to 1 cm to allow for LE symmetry, shoe and clothing choice, and ability to apply needed compression.  MET  2. Patient will demonstrate 100% knowledge of lymphedema precautions and signs of infection to allow for reduced lymphedema risk, infection risk, and/or exacerbation of condition.  MET  3. Patient or caregiver will perform self-bandaging techniques and/or wearing of compression garments to allow for lymphatic drainage support, skin elasticity, and reduction in shape and size of limb. MET  4. Patient will perform self lymph drainage  techniques to areas within reach to enhance lymphatic drainage and skin condition.  (progressing, not met)  5. Patient will tolerate daily activities with multilayered bandaging to allow for lymphatic and venous support.  (progressing, not met)     Long Term Goals: (12  weeks)  1. Patient will show decreased girth in B LE by up to 2 cm  to allow for LE symmetry, shoe and clothing choice, and ability to apply needed compression daily.  MET  2. Patient will show reduction in density to mild or less with improved contour of limb to allow for cosmesis, LE symmetry, infection risk reduction, and clothing and compression choice. MET   3. Patient to krystal/doff compression garment with daily compliance to assist in lymphedema management, skin elasticity, and tissue density.  (progressing, not met)  4. Pt to show improved postural awareness and alignment.  (progressing, not met)  5. Pt to be I and compliant with HEP to allow for increased function in affected limb.   (progressing, not met)        Plan   Continue PT  2x   weekly for Complete Decongestive Therapy:  Manual lymphatic drainage, Multilayered short stretch bandaging, Pneumatic compression, Therapeutic exercises, Patient education as deemed necessary to achieve stated goals.      Luisa Thompson, PT

## 2024-01-10 ENCOUNTER — CLINICAL SUPPORT (OUTPATIENT)
Dept: REHABILITATION | Facility: HOSPITAL | Age: 69
End: 2024-01-10
Payer: MEDICARE

## 2024-01-10 DIAGNOSIS — N18.30 TYPE 2 DIABETES MELLITUS WITH STAGE 3 CHRONIC KIDNEY DISEASE, WITH LONG-TERM CURRENT USE OF INSULIN, UNSPECIFIED WHETHER STAGE 3A OR 3B CKD: ICD-10-CM

## 2024-01-10 DIAGNOSIS — Z79.4 TYPE 2 DIABETES MELLITUS WITH STAGE 3 CHRONIC KIDNEY DISEASE, WITH LONG-TERM CURRENT USE OF INSULIN, UNSPECIFIED WHETHER STAGE 3A OR 3B CKD: ICD-10-CM

## 2024-01-10 DIAGNOSIS — I87.2 VENOUS INSUFFICIENCY OF BOTH LOWER EXTREMITIES: Primary | ICD-10-CM

## 2024-01-10 DIAGNOSIS — E11.22 TYPE 2 DIABETES MELLITUS WITH STAGE 3 CHRONIC KIDNEY DISEASE, WITH LONG-TERM CURRENT USE OF INSULIN, UNSPECIFIED WHETHER STAGE 3A OR 3B CKD: ICD-10-CM

## 2024-01-10 LAB
LEFT EYE DM RETINOPATHY: NEGATIVE
RIGHT EYE DM RETINOPATHY: NEGATIVE

## 2024-01-10 PROCEDURE — 97140 MANUAL THERAPY 1/> REGIONS: CPT | Mod: CQ

## 2024-01-10 NOTE — PROGRESS NOTES
Physical Therapy Daily Treatment Note     Name: Jian JASMINE [facility] Number: [id number]    Therapy Diagnosis:   Encounter Diagnoses   Name Primary?    Venous insufficiency of both lower extremities Yes    Type 2 diabetes mellitus with stage 3 chronic kidney disease, with long-term current use of insulin, unspecified whether stage 3a or 3b CKD        Physician: Savanah Felton DPM    Visit Date: 1/10/2024    Physician: Savanah Felton DPM  Physician Orders: PT Eval and Treat - lymphedema  Medical Diagnosis from Referral: I89.0 (ICD-10-CM) - Lymphedema of both lower extremities    L03.119 (ICD-10-CM) - Cellulitis of lower extremity, unspecified laterality  Evaluation Date: 7/31/2023  Authorization Period Expiration: 12/31/2024  Plan of Care Expiration: 1/22/2023  Visit # / Visits authorized: 2/20     Time In: 9:00 am   Time Out: 9:53 am   Total Billable Time: 53 minutes      Precautions: Standard and Diabetes    Subjective     Pt reports: he feels he sleeps better if he takes an asprin or two before bed . Legs are doing fine with no pain.   He was compliant with home exercise program.  Response to previous treatment: good response.  Functional change: none    Pain: 0/10  Location: bilateral lower legs     Objective     Pt presents no compression , brings velcro wraps     Treatment:   Jian received the following manual therapy techniques:- Manual Lymphatic Drainage were applied to the: BLE for 53 minutes, including: MLD and short stretch compression bandaging       MANUAL LYMPHATIC DRAINAGE (MLD):    While supine with LEs elevated stimulation at terminus, along GI region, B inguinal regions, drainage of entire BLE betina lower leg, ankle, and foot with return proximally,  Use of Aquaphor due to dryness.   Educated in self massage to abdominal areas, B inguinal areas, thigh, and remaining LE within reach.      MULTILAYERED BANDAGING: velcro wraps applied by PT on BLEs   Issued tubigrip size G for BLE velcro wrap  liner       Home Exercises Provided and Patient Education Provided     Self care/home management :   - Pt brings compreflex XL velcro wraps   - Donned tubigrip with double fold on foot and ankle . Donned velcro wrap to RLE over tubigrip with education on donning/doffing and proper fit . Pt self applied tubigrip and velcro wrap to LLE with therapist guidance.   - Advised to apply in the morning and can remove before bed .     Education provided:    Remove bandages if painful  PATIENT/FAMILY Education: bandaging wear schedule,  HEP,  Beginning of self massage,  Self or assisted bandaging, compression options, and Risk reduction    Written Home Exercises Provided: Patient instructed to cont prior HEP.  Exercises were reviewed and Jian was able to demonstrate them prior to the end of the session.  Jian demonstrated good  understanding of the education provided.       Assessment     Pt presents with well maintained swelling and compliance with daily use of velcro compression wraps . Session focused on MLD to help decreased swelling and improve skin integrity which pt tolerated well. F/U treatment with application of velcro wraps to prevent exacerbations of swelling . Will continue to progress .     Jian Is progressing well towards his goals.   Pt prognosis is Fair.     Pt will continue to benefit from skilled outpatient physical therapy to address the deficits listed in the problem list box on initial evaluation, provide pt/family education and to maximize pt's level of independence in the home and community environment.   Pt's spiritual, cultural and educational needs considered and pt agreeable to plan of care and goals.     Anticipated barriers to physical therapy: none    Goals:   Short Term Goals: (6 weeks)  1. Patient will show decreased girth in B LE by up to 1 cm to allow for LE symmetry, shoe and clothing choice, and ability to apply needed compression.  (progressing, not met)   2. Patient will demonstrate 100%  knowledge of lymphedema precautions and signs of infection to allow for reduced lymphedema risk, infection risk, and/or exacerbation of condition.  (progressing, not met)  3. Patient or caregiver will perform self-bandaging techniques and/or wearing of compression garments to allow for lymphatic drainage support, skin elasticity, and reduction in shape and size of limb. (progressing, not met)  4. Patient will perform self lymph drainage techniques to areas within reach to enhance lymphatic drainage and skin condition.  (progressing, not met)  5. Patient will tolerate daily activities with multilayered bandaging to allow for lymphatic and venous support.  (progressing, not met)     Long Term Goals: (12  weeks)  1. Patient will show decreased girth in B LE by up to 2 cm  to allow for LE symmetry, shoe and clothing choice, and ability to apply needed compression daily.  (progressing, not met)  2. Patient will show reduction in density to mild or less with improved contour of limb to allow for cosmesis, LE symmetry, infection risk reduction, and clothing and compression choice.   (progressing, not met)  3. Patient to krystal/doff compression garment with daily compliance to assist in lymphedema management, skin elasticity, and tissue density.  (progressing, not met)  4. Pt to show improved postural awareness and alignment.  (progressing, not met)  5. Pt to be I and compliant with HEP to allow for increased function in affected limb.   (progressing, not met)    Plan   Continue PT  2x   weekly for Complete Decongestive Therapy:  Manual lymphatic drainage, Multilayered short stretch bandaging, Pneumatic compression, Therapeutic exercises, Patient education as deemed necessary to achieve stated goals.      Raeann Rodriguez, PTA

## 2024-01-15 PROBLEM — N17.9 AKI (ACUTE KIDNEY INJURY): Status: RESOLVED | Noted: 2023-10-13 | Resolved: 2024-01-15

## 2024-01-16 ENCOUNTER — CLINICAL SUPPORT (OUTPATIENT)
Dept: REHABILITATION | Facility: HOSPITAL | Age: 69
End: 2024-01-16
Payer: MEDICARE

## 2024-01-16 DIAGNOSIS — E11.22 TYPE 2 DIABETES MELLITUS WITH STAGE 3 CHRONIC KIDNEY DISEASE, WITH LONG-TERM CURRENT USE OF INSULIN, UNSPECIFIED WHETHER STAGE 3A OR 3B CKD: ICD-10-CM

## 2024-01-16 DIAGNOSIS — I87.2 VENOUS INSUFFICIENCY OF BOTH LOWER EXTREMITIES: Primary | ICD-10-CM

## 2024-01-16 DIAGNOSIS — N18.30 TYPE 2 DIABETES MELLITUS WITH STAGE 3 CHRONIC KIDNEY DISEASE, WITH LONG-TERM CURRENT USE OF INSULIN, UNSPECIFIED WHETHER STAGE 3A OR 3B CKD: ICD-10-CM

## 2024-01-16 DIAGNOSIS — Z79.4 TYPE 2 DIABETES MELLITUS WITH STAGE 3 CHRONIC KIDNEY DISEASE, WITH LONG-TERM CURRENT USE OF INSULIN, UNSPECIFIED WHETHER STAGE 3A OR 3B CKD: ICD-10-CM

## 2024-01-16 PROCEDURE — 97140 MANUAL THERAPY 1/> REGIONS: CPT

## 2024-01-16 NOTE — PROGRESS NOTES
Physical Therapy Daily Treatment Note     Name: Jian JASMINE [facility] Number: [id number]    Therapy Diagnosis:   Encounter Diagnoses   Name Primary?    Venous insufficiency of both lower extremities Yes    Type 2 diabetes mellitus with stage 3 chronic kidney disease, with long-term current use of insulin, unspecified whether stage 3a or 3b CKD          Physician: Savanah Felton DPM    Visit Date: 1/16/2024    Physician: Savanah Felton DPM  Physician Orders: PT Eval and Treat - lymphedema  Medical Diagnosis from Referral: I89.0 (ICD-10-CM) - Lymphedema of both lower extremities    L03.119 (ICD-10-CM) - Cellulitis of lower extremity, unspecified laterality  Evaluation Date: 7/31/2023  Authorization Period Expiration: 12/31/2024  Plan of Care Expiration: 1/22/2023  Visit # / Visits authorized: 4/20     Time In: 11:00am  Time Out: 12:00pm  Total Billable Time: 60 minutes      Precautions: Standard and Diabetes    Subjective     Pt reports: He has been doing ok but admits he isn't the most consistent with wearing his velcro wraps   He was compliant with home exercise program.  Response to previous treatment: good response.  Functional change: none    Pain: 0/10  Location: bilateral lower legs     Objective     Pt presents no compression , brings velcro wraps     Treatment:   Jian received the following manual therapy techniques:- Manual Lymphatic Drainage were applied to the: BLE for 60 minutes, including: MLD and short stretch compression bandaging       MANUAL LYMPHATIC DRAINAGE (MLD):    While supine with LEs elevated stimulation at terminus, along GI region, B inguinal regions, drainage of entire BLE betina lower leg, ankle, and foot with return proximally,  Use of Aquaphor due to dryness.   Educated in self massage to abdominal areas, B inguinal areas, thigh, and remaining LE within reach.      MULTILAYERED BANDAGING: velcro wraps applied by PT on BLEs         Home Exercises Provided and Patient Education  Provided     Self care/home management :   - Pt brings compreflex XL velcro wraps   - Donned tubigrip with double fold on foot and ankle . Donned velcro wrap to RLE over tubigrip with education on donning/doffing and proper fit . Pt self applied tubigrip and velcro wrap to LLE with therapist guidance.   - Advised to apply in the morning and can remove before bed .     Education provided:    Remove bandages if painful  PATIENT/FAMILY Education: bandaging wear schedule,  HEP,  Beginning of self massage,  Self or assisted bandaging, compression options, and Risk reduction    Written Home Exercises Provided: Patient instructed to cont prior HEP.  Exercises were reviewed and Jian was able to demonstrate them prior to the end of the session.  Jian demonstrated good  understanding of the education provided.       Assessment     Pt tolerated MLD Of the BLEs well with reports of increased comfort. Pt was edcuated on need to wear velcro wraps daily to prepare pt for discharge for home management. Will continue to progress     Jian Is progressing well towards his goals.   Pt prognosis is Fair.     Pt will continue to benefit from skilled outpatient physical therapy to address the deficits listed in the problem list box on initial evaluation, provide pt/family education and to maximize pt's level of independence in the home and community environment.   Pt's spiritual, cultural and educational needs considered and pt agreeable to plan of care and goals.     Anticipated barriers to physical therapy: none    Goals:   Short Term Goals: (6 weeks)  1. Patient will show decreased girth in B LE by up to 1 cm to allow for LE symmetry, shoe and clothing choice, and ability to apply needed compression.  (progressing, not met)   2. Patient will demonstrate 100% knowledge of lymphedema precautions and signs of infection to allow for reduced lymphedema risk, infection risk, and/or exacerbation of condition.  (progressing, not met)  3.  Patient or caregiver will perform self-bandaging techniques and/or wearing of compression garments to allow for lymphatic drainage support, skin elasticity, and reduction in shape and size of limb. (progressing, not met)  4. Patient will perform self lymph drainage techniques to areas within reach to enhance lymphatic drainage and skin condition.  (progressing, not met)  5. Patient will tolerate daily activities with multilayered bandaging to allow for lymphatic and venous support.  (progressing, not met)     Long Term Goals: (12  weeks)  1. Patient will show decreased girth in B LE by up to 2 cm  to allow for LE symmetry, shoe and clothing choice, and ability to apply needed compression daily.  (progressing, not met)  2. Patient will show reduction in density to mild or less with improved contour of limb to allow for cosmesis, LE symmetry, infection risk reduction, and clothing and compression choice.   (progressing, not met)  3. Patient to krystal/doff compression garment with daily compliance to assist in lymphedema management, skin elasticity, and tissue density.  (progressing, not met)  4. Pt to show improved postural awareness and alignment.  (progressing, not met)  5. Pt to be I and compliant with HEP to allow for increased function in affected limb.   (progressing, not met)    Plan   Continue PT  2x   weekly for Complete Decongestive Therapy:  Manual lymphatic drainage, Multilayered short stretch bandaging, Pneumatic compression, Therapeutic exercises, Patient education as deemed necessary to achieve stated goals.      Luisa Thompson, PT

## 2024-01-18 ENCOUNTER — LAB VISIT (OUTPATIENT)
Dept: LAB | Facility: HOSPITAL | Age: 69
End: 2024-01-18
Payer: MEDICARE

## 2024-01-18 DIAGNOSIS — E78.5 HYPERLIPIDEMIA ASSOCIATED WITH TYPE 2 DIABETES MELLITUS: Chronic | ICD-10-CM

## 2024-01-18 DIAGNOSIS — E11.69 HYPERLIPIDEMIA ASSOCIATED WITH TYPE 2 DIABETES MELLITUS: Chronic | ICD-10-CM

## 2024-01-18 DIAGNOSIS — E13.9 LADA (LATENT AUTOIMMUNE DIABETES IN ADULTS), MANAGED AS TYPE 1: ICD-10-CM

## 2024-01-18 LAB
ALBUMIN SERPL BCP-MCNC: 2.7 G/DL (ref 3.5–5.2)
ALP SERPL-CCNC: 144 U/L (ref 55–135)
ALT SERPL W/O P-5'-P-CCNC: 55 U/L (ref 10–44)
ANION GAP SERPL CALC-SCNC: 5 MMOL/L (ref 8–16)
AST SERPL-CCNC: 54 U/L (ref 10–40)
BILIRUB SERPL-MCNC: 0.4 MG/DL (ref 0.1–1)
BUN SERPL-MCNC: 26 MG/DL (ref 8–23)
C PEPTIDE SERPL-MCNC: 0.19 NG/ML (ref 0.78–5.19)
CALCIUM SERPL-MCNC: 8.1 MG/DL (ref 8.7–10.5)
CHLORIDE SERPL-SCNC: 112 MMOL/L (ref 95–110)
CHOLEST SERPL-MCNC: 165 MG/DL (ref 120–199)
CHOLEST/HDLC SERPL: 2.8 {RATIO} (ref 2–5)
CO2 SERPL-SCNC: 23 MMOL/L (ref 23–29)
CREAT SERPL-MCNC: 2.5 MG/DL (ref 0.5–1.4)
EST. GFR  (NO RACE VARIABLE): 27.1 ML/MIN/1.73 M^2
ESTIMATED AVG GLUCOSE: 146 MG/DL (ref 68–131)
GLUCOSE SERPL-MCNC: 100 MG/DL (ref 70–110)
HBA1C MFR BLD: 6.7 % (ref 4–5.6)
HDLC SERPL-MCNC: 58 MG/DL (ref 40–75)
HDLC SERPL: 35.2 % (ref 20–50)
LDLC SERPL CALC-MCNC: 94.4 MG/DL (ref 63–159)
NONHDLC SERPL-MCNC: 107 MG/DL
POTASSIUM SERPL-SCNC: 4.8 MMOL/L (ref 3.5–5.1)
PROT SERPL-MCNC: 5.8 G/DL (ref 6–8.4)
SODIUM SERPL-SCNC: 140 MMOL/L (ref 136–145)
TRIGL SERPL-MCNC: 63 MG/DL (ref 30–150)

## 2024-01-18 PROCEDURE — 86341 ISLET CELL ANTIBODY: CPT | Performed by: NURSE PRACTITIONER

## 2024-01-18 PROCEDURE — 84681 ASSAY OF C-PEPTIDE: CPT | Performed by: NURSE PRACTITIONER

## 2024-01-18 PROCEDURE — 80053 COMPREHEN METABOLIC PANEL: CPT | Performed by: NURSE PRACTITIONER

## 2024-01-18 PROCEDURE — 83036 HEMOGLOBIN GLYCOSYLATED A1C: CPT | Performed by: NURSE PRACTITIONER

## 2024-01-18 PROCEDURE — 80061 LIPID PANEL: CPT | Performed by: NURSE PRACTITIONER

## 2024-01-18 PROCEDURE — 86341 ISLET CELL ANTIBODY: CPT | Mod: 91 | Performed by: NURSE PRACTITIONER

## 2024-01-19 ENCOUNTER — CLINICAL SUPPORT (OUTPATIENT)
Dept: REHABILITATION | Facility: HOSPITAL | Age: 69
End: 2024-01-19
Payer: MEDICARE

## 2024-01-19 DIAGNOSIS — E11.22 TYPE 2 DIABETES MELLITUS WITH STAGE 3 CHRONIC KIDNEY DISEASE, WITH LONG-TERM CURRENT USE OF INSULIN, UNSPECIFIED WHETHER STAGE 3A OR 3B CKD: ICD-10-CM

## 2024-01-19 DIAGNOSIS — Z79.4 TYPE 2 DIABETES MELLITUS WITH STAGE 3 CHRONIC KIDNEY DISEASE, WITH LONG-TERM CURRENT USE OF INSULIN, UNSPECIFIED WHETHER STAGE 3A OR 3B CKD: ICD-10-CM

## 2024-01-19 DIAGNOSIS — N18.30 TYPE 2 DIABETES MELLITUS WITH STAGE 3 CHRONIC KIDNEY DISEASE, WITH LONG-TERM CURRENT USE OF INSULIN, UNSPECIFIED WHETHER STAGE 3A OR 3B CKD: ICD-10-CM

## 2024-01-19 DIAGNOSIS — I87.2 VENOUS INSUFFICIENCY OF BOTH LOWER EXTREMITIES: Primary | ICD-10-CM

## 2024-01-19 PROCEDURE — 97140 MANUAL THERAPY 1/> REGIONS: CPT | Mod: CQ

## 2024-01-19 NOTE — PROGRESS NOTES
Physical Therapy Daily Treatment Note     Name: Jian JASMINE [facility] Number: [id number]    Therapy Diagnosis:   Encounter Diagnoses   Name Primary?    Venous insufficiency of both lower extremities Yes    Type 2 diabetes mellitus with stage 3 chronic kidney disease, with long-term current use of insulin, unspecified whether stage 3a or 3b CKD        Physician: Savanah Felton DPM    Visit Date: 1/19/2024    Physician: Savanah Felton DPM  Physician Orders: PT Eval and Treat - lymphedema  Medical Diagnosis from Referral: I89.0 (ICD-10-CM) - Lymphedema of both lower extremities    L03.119 (ICD-10-CM) - Cellulitis of lower extremity, unspecified laterality  Evaluation Date: 7/31/2023  Authorization Period Expiration: 12/31/2024  Plan of Care Expiration: 1/22/2023  Visit # / Visits authorized: 4/20     Time In: 9:00 am   Time Out: 9:55 am   Total Billable Time: 55 minutes      Precautions: Standard and Diabetes    Subjective     Pt reports: almost didn't make it in time due to car trouble . He reports legs are doing fine today and has been wearing his velcro wraps every day .   He was compliant with home exercise program.  Response to previous treatment: good response.  Functional change: none    Pain: 0/10  Location: bilateral lower legs     Objective     Pt presents velcro wraps donned BLEs     Treatment:   Jian received the following manual therapy techniques:- Manual Lymphatic Drainage were applied to the: BLE for 55 minutes, including: MLD and short stretch compression bandaging       MANUAL LYMPHATIC DRAINAGE (MLD):    While supine with LEs elevated stimulation at terminus, along GI region, B inguinal regions, drainage of entire BLE betina lower leg, ankle, and foot with return proximally,  Use of Aquaphor due to dryness.   Educated in self massage to abdominal areas, B inguinal areas, thigh, and remaining LE within reach.      MULTILAYERED BANDAGING: velcro wraps applied by PT on BLEs   Tubigrip size G for  BLE velcro wrap liner       Home Exercises Provided and Patient Education Provided     Self care/home management :   - Pt brings compreflex XL velcro wraps   - Donned tubigrip with double fold on foot and ankle . Donned velcro wrap to RLE over tubigrip with education on donning/doffing and proper fit . Pt self applied tubigrip and velcro wrap to LLE with therapist guidance.   - Advised to apply in the morning and can remove before bed .     Education provided:    Remove bandages if painful  PATIENT/FAMILY Education: bandaging wear schedule,  HEP,  Beginning of self massage,  Self or assisted bandaging, compression options, and Risk reduction    Written Home Exercises Provided: Patient instructed to cont prior HEP.  Exercises were reviewed and Jian was able to demonstrate them prior to the end of the session.  Jian demonstrated good  understanding of the education provided.       Assessment     Pt is managing well with no increase in swelling and compliance with daily use of compression velcro wraps .  Session focused on MLD to help decreased swelling and improve skin integrity which pt tolerated well. F/U treatment with application of velcro wraps to prevent exacerbations of swelling . Will continue to progress .     Jian Is progressing well towards his goals.   Pt prognosis is Fair.     Pt will continue to benefit from skilled outpatient physical therapy to address the deficits listed in the problem list box on initial evaluation, provide pt/family education and to maximize pt's level of independence in the home and community environment.   Pt's spiritual, cultural and educational needs considered and pt agreeable to plan of care and goals.     Anticipated barriers to physical therapy: none    Goals:   Short Term Goals: (6 weeks)  1. Patient will show decreased girth in B LE by up to 1 cm to allow for LE symmetry, shoe and clothing choice, and ability to apply needed compression.  (progressing, not met)   2.  Patient will demonstrate 100% knowledge of lymphedema precautions and signs of infection to allow for reduced lymphedema risk, infection risk, and/or exacerbation of condition.  (progressing, not met)  3. Patient or caregiver will perform self-bandaging techniques and/or wearing of compression garments to allow for lymphatic drainage support, skin elasticity, and reduction in shape and size of limb. (progressing, not met)  4. Patient will perform self lymph drainage techniques to areas within reach to enhance lymphatic drainage and skin condition.  (progressing, not met)  5. Patient will tolerate daily activities with multilayered bandaging to allow for lymphatic and venous support.  (progressing, not met)     Long Term Goals: (12  weeks)  1. Patient will show decreased girth in B LE by up to 2 cm  to allow for LE symmetry, shoe and clothing choice, and ability to apply needed compression daily.  (progressing, not met)  2. Patient will show reduction in density to mild or less with improved contour of limb to allow for cosmesis, LE symmetry, infection risk reduction, and clothing and compression choice.   (progressing, not met)  3. Patient to krystal/doff compression garment with daily compliance to assist in lymphedema management, skin elasticity, and tissue density.  (progressing, not met)  4. Pt to show improved postural awareness and alignment.  (progressing, not met)  5. Pt to be I and compliant with HEP to allow for increased function in affected limb.   (progressing, not met)    Plan   Continue PT  2x   weekly for Complete Decongestive Therapy:  Manual lymphatic drainage, Multilayered short stretch bandaging, Pneumatic compression, Therapeutic exercises, Patient education as deemed necessary to achieve stated goals.      Raeann Rodriguez, PTA

## 2024-01-22 ENCOUNTER — CLINICAL SUPPORT (OUTPATIENT)
Dept: REHABILITATION | Facility: HOSPITAL | Age: 69
End: 2024-01-22
Payer: MEDICARE

## 2024-01-22 DIAGNOSIS — N18.30 TYPE 2 DIABETES MELLITUS WITH STAGE 3 CHRONIC KIDNEY DISEASE, WITH LONG-TERM CURRENT USE OF INSULIN, UNSPECIFIED WHETHER STAGE 3A OR 3B CKD: ICD-10-CM

## 2024-01-22 DIAGNOSIS — Z79.4 TYPE 2 DIABETES MELLITUS WITH STAGE 3 CHRONIC KIDNEY DISEASE, WITH LONG-TERM CURRENT USE OF INSULIN, UNSPECIFIED WHETHER STAGE 3A OR 3B CKD: ICD-10-CM

## 2024-01-22 DIAGNOSIS — I87.2 VENOUS INSUFFICIENCY OF BOTH LOWER EXTREMITIES: Primary | ICD-10-CM

## 2024-01-22 DIAGNOSIS — E11.22 TYPE 2 DIABETES MELLITUS WITH STAGE 3 CHRONIC KIDNEY DISEASE, WITH LONG-TERM CURRENT USE OF INSULIN, UNSPECIFIED WHETHER STAGE 3A OR 3B CKD: ICD-10-CM

## 2024-01-22 PROCEDURE — 97140 MANUAL THERAPY 1/> REGIONS: CPT | Mod: CQ

## 2024-01-22 NOTE — PROGRESS NOTES
Physical Therapy Daily Treatment Note     Name: Jian JASMINE [facility] Number: [id number]    Therapy Diagnosis:   Encounter Diagnoses   Name Primary?    Venous insufficiency of both lower extremities Yes    Type 2 diabetes mellitus with stage 3 chronic kidney disease, with long-term current use of insulin, unspecified whether stage 3a or 3b CKD        Physician: Savanah Felton DPM    Visit Date: 1/22/2024    Physician: Savanah Felton DPM  Physician Orders: PT Eval and Treat - lymphedema  Medical Diagnosis from Referral: I89.0 (ICD-10-CM) - Lymphedema of both lower extremities    L03.119 (ICD-10-CM) - Cellulitis of lower extremity, unspecified laterality  Evaluation Date: 7/31/2023  Authorization Period Expiration: 12/31/2024  Plan of Care Expiration: 1/22/2023  Visit # / Visits authorized: 5/20     Time In: 9:00 am   Time Out: 9:55 am   Total Billable Time: 55 minutes      Precautions: Standard and Diabetes    Subjective     Pt reports: he ate jambalaya last night that mustve had a lot of sodium because his legs are swollen today .   He was compliant with home exercise program.  Response to previous treatment: good response.  Functional change: none    Pain: 0/10  Location: bilateral lower legs     Objective     Pt presents velcro wraps donned BLEs     Treatment:   Jian received the following manual therapy techniques:- Manual Lymphatic Drainage were applied to the: BLE for 55 minutes, including: MLD and short stretch compression bandaging       MANUAL LYMPHATIC DRAINAGE (MLD):    While supine with LEs elevated stimulation at terminus, along GI region, B inguinal regions, drainage of entire BLE betina lower leg, ankle, and foot with return proximally,  Use of Aquaphor due to dryness.   Educated in self massage to abdominal areas, B inguinal areas, thigh, and remaining LE within reach.      MULTILAYERED BANDAGING: velcro wraps applied by PT on BLEs   Tubigrip size G for BLE velcro wrap liner       Home Exercises  Provided and Patient Education Provided     Self care/home management :   - Pt brings compreflex XL velcro wraps   - Donned tubigrip with double fold on foot and ankle . Donned velcro wrap to RLE over tubigrip with education on donning/doffing and proper fit . Pt self applied tubigrip and velcro wrap to LLE with therapist guidance.   - Advised to apply in the morning and can remove before bed .     Education provided:    Remove bandages if painful  PATIENT/FAMILY Education: bandaging wear schedule,  HEP,  Beginning of self massage,  Self or assisted bandaging, compression options, and Risk reduction    Written Home Exercises Provided: Patient instructed to cont prior HEP.  Exercises were reviewed and Jian was able to demonstrate them prior to the end of the session.  Jian demonstrated good  understanding of the education provided.       Assessment     Noted slight increased swelling today in BLEs likely due to increased sodium intake yesterday . Pt has been managing well otherwise with compliance with daily use of compression velcro wraps .  Session focused on MLD to help decreased swelling and improve skin integrity which pt tolerated well. F/U treatment with application of velcro wraps to prevent exacerbations of swelling . Will continue to progress .     Jian Is progressing well towards his goals.   Pt prognosis is Fair.     Pt will continue to benefit from skilled outpatient physical therapy to address the deficits listed in the problem list box on initial evaluation, provide pt/family education and to maximize pt's level of independence in the home and community environment.   Pt's spiritual, cultural and educational needs considered and pt agreeable to plan of care and goals.     Anticipated barriers to physical therapy: none    Goals:   Short Term Goals: (6 weeks)  1. Patient will show decreased girth in B LE by up to 1 cm to allow for LE symmetry, shoe and clothing choice, and ability to apply needed  compression.  (progressing, not met)   2. Patient will demonstrate 100% knowledge of lymphedema precautions and signs of infection to allow for reduced lymphedema risk, infection risk, and/or exacerbation of condition.  (progressing, not met)  3. Patient or caregiver will perform self-bandaging techniques and/or wearing of compression garments to allow for lymphatic drainage support, skin elasticity, and reduction in shape and size of limb. (progressing, not met)  4. Patient will perform self lymph drainage techniques to areas within reach to enhance lymphatic drainage and skin condition.  (progressing, not met)  5. Patient will tolerate daily activities with multilayered bandaging to allow for lymphatic and venous support.  (progressing, not met)     Long Term Goals: (12  weeks)  1. Patient will show decreased girth in B LE by up to 2 cm  to allow for LE symmetry, shoe and clothing choice, and ability to apply needed compression daily.  (progressing, not met)  2. Patient will show reduction in density to mild or less with improved contour of limb to allow for cosmesis, LE symmetry, infection risk reduction, and clothing and compression choice.   (progressing, not met)  3. Patient to krystal/doff compression garment with daily compliance to assist in lymphedema management, skin elasticity, and tissue density.  (progressing, not met)  4. Pt to show improved postural awareness and alignment.  (progressing, not met)  5. Pt to be I and compliant with HEP to allow for increased function in affected limb.   (progressing, not met)    Plan   Continue PT  2x   weekly for Complete Decongestive Therapy:  Manual lymphatic drainage, Multilayered short stretch bandaging, Pneumatic compression, Therapeutic exercises, Patient education as deemed necessary to achieve stated goals.      Raeann Rodriguez, PTA

## 2024-01-24 LAB
GAD65 AB SER-SCNC: 0 NMOL/L
PANC ISLET CELL IGG SER-ACNC: NORMAL

## 2024-02-07 DIAGNOSIS — E13.9 LADA (LATENT AUTOIMMUNE DIABETES IN ADULTS), MANAGED AS TYPE 1: ICD-10-CM

## 2024-02-07 RX ORDER — BLOOD-GLUCOSE TRANSMITTER
1 EACH MISCELLANEOUS
Qty: 1 EACH | Refills: 3 | Status: SHIPPED | OUTPATIENT
Start: 2024-02-07

## 2024-02-09 ENCOUNTER — PATIENT OUTREACH (OUTPATIENT)
Dept: ADMINISTRATIVE | Facility: HOSPITAL | Age: 69
End: 2024-02-09
Payer: MEDICARE

## 2024-02-09 NOTE — PROGRESS NOTES

## 2024-02-12 ENCOUNTER — LAB VISIT (OUTPATIENT)
Dept: LAB | Facility: HOSPITAL | Age: 69
End: 2024-02-12
Attending: INTERNAL MEDICINE
Payer: MEDICARE

## 2024-02-12 DIAGNOSIS — N25.81 SECONDARY HYPERPARATHYROIDISM: ICD-10-CM

## 2024-02-12 DIAGNOSIS — N18.4 CHRONIC KIDNEY DISEASE, STAGE 4 (SEVERE): ICD-10-CM

## 2024-02-12 LAB
25(OH)D3+25(OH)D2 SERPL-MCNC: 14 NG/ML (ref 30–96)
ALBUMIN SERPL BCP-MCNC: 2.8 G/DL (ref 3.5–5.2)
ANION GAP SERPL CALC-SCNC: 6 MMOL/L (ref 8–16)
BACTERIA #/AREA URNS AUTO: ABNORMAL /HPF
BASOPHILS # BLD AUTO: 0.04 K/UL (ref 0–0.2)
BASOPHILS NFR BLD: 0.6 % (ref 0–1.9)
BILIRUB UR QL STRIP: NEGATIVE
BUN SERPL-MCNC: 31 MG/DL (ref 8–23)
CALCIUM SERPL-MCNC: 8.3 MG/DL (ref 8.7–10.5)
CHLORIDE SERPL-SCNC: 112 MMOL/L (ref 95–110)
CLARITY UR REFRACT.AUTO: CLEAR
CO2 SERPL-SCNC: 20 MMOL/L (ref 23–29)
COLOR UR AUTO: ABNORMAL
CREAT SERPL-MCNC: 3.5 MG/DL (ref 0.5–1.4)
CREAT UR-MCNC: 60 MG/DL (ref 23–375)
DIFFERENTIAL METHOD BLD: ABNORMAL
EOSINOPHIL # BLD AUTO: 0.2 K/UL (ref 0–0.5)
EOSINOPHIL NFR BLD: 3.4 % (ref 0–8)
ERYTHROCYTE [DISTWIDTH] IN BLOOD BY AUTOMATED COUNT: 14.1 % (ref 11.5–14.5)
EST. GFR  (NO RACE VARIABLE): 18.1 ML/MIN/1.73 M^2
GLUCOSE SERPL-MCNC: 295 MG/DL (ref 70–110)
GLUCOSE UR QL STRIP: ABNORMAL
HCT VFR BLD AUTO: 40.9 % (ref 40–54)
HGB BLD-MCNC: 13.1 G/DL (ref 14–18)
HGB UR QL STRIP: ABNORMAL
HYALINE CASTS UR QL AUTO: 3 /LPF
IMM GRANULOCYTES # BLD AUTO: 0.02 K/UL (ref 0–0.04)
IMM GRANULOCYTES NFR BLD AUTO: 0.3 % (ref 0–0.5)
KETONES UR QL STRIP: NEGATIVE
LEUKOCYTE ESTERASE UR QL STRIP: NEGATIVE
LYMPHOCYTES # BLD AUTO: 1.8 K/UL (ref 1–4.8)
LYMPHOCYTES NFR BLD: 28.8 % (ref 18–48)
MCH RBC QN AUTO: 30.1 PG (ref 27–31)
MCHC RBC AUTO-ENTMCNC: 32 G/DL (ref 32–36)
MCV RBC AUTO: 94 FL (ref 82–98)
MICROSCOPIC COMMENT: ABNORMAL
MONOCYTES # BLD AUTO: 0.4 K/UL (ref 0.3–1)
MONOCYTES NFR BLD: 6.5 % (ref 4–15)
NEUTROPHILS # BLD AUTO: 3.7 K/UL (ref 1.8–7.7)
NEUTROPHILS NFR BLD: 60.4 % (ref 38–73)
NITRITE UR QL STRIP: NEGATIVE
NRBC BLD-RTO: 0 /100 WBC
PH UR STRIP: 5 [PH] (ref 5–8)
PHOSPHATE SERPL-MCNC: 4.2 MG/DL (ref 2.7–4.5)
PHOSPHATE SERPL-MCNC: 4.2 MG/DL (ref 2.7–4.5)
PLATELET # BLD AUTO: 133 K/UL (ref 150–450)
PMV BLD AUTO: 11.8 FL (ref 9.2–12.9)
POTASSIUM SERPL-SCNC: 4.7 MMOL/L (ref 3.5–5.1)
PROT UR QL STRIP: ABNORMAL
PROT UR-MCNC: 322 MG/DL (ref 0–15)
PROT/CREAT UR: 5.37 MG/G{CREAT} (ref 0–0.2)
RBC # BLD AUTO: 4.35 M/UL (ref 4.6–6.2)
RBC #/AREA URNS AUTO: 4 /HPF (ref 0–4)
SODIUM SERPL-SCNC: 138 MMOL/L (ref 136–145)
SP GR UR STRIP: 1.01 (ref 1–1.03)
URN SPEC COLLECT METH UR: ABNORMAL
WBC # BLD AUTO: 6.18 K/UL (ref 3.9–12.7)
WBC #/AREA URNS AUTO: 1 /HPF (ref 0–5)

## 2024-02-12 PROCEDURE — 81001 URINALYSIS AUTO W/SCOPE: CPT | Performed by: INTERNAL MEDICINE

## 2024-02-12 PROCEDURE — 80069 RENAL FUNCTION PANEL: CPT | Performed by: INTERNAL MEDICINE

## 2024-02-12 PROCEDURE — 36415 COLL VENOUS BLD VENIPUNCTURE: CPT | Mod: PO | Performed by: INTERNAL MEDICINE

## 2024-02-12 PROCEDURE — 85025 COMPLETE CBC W/AUTO DIFF WBC: CPT | Performed by: INTERNAL MEDICINE

## 2024-02-12 PROCEDURE — 82570 ASSAY OF URINE CREATININE: CPT | Performed by: INTERNAL MEDICINE

## 2024-02-12 PROCEDURE — 82306 VITAMIN D 25 HYDROXY: CPT | Performed by: INTERNAL MEDICINE

## 2024-02-15 ENCOUNTER — PATIENT MESSAGE (OUTPATIENT)
Dept: NEPHROLOGY | Facility: CLINIC | Age: 69
End: 2024-02-15
Payer: MEDICARE

## 2024-02-16 NOTE — PROGRESS NOTES
HPI  This 68 y.o. y/o male with PMH of type 2 DM, HTN, HLD, hx of HFrEF&HFpEF (most recent echo showed normal EF and no diastolic dysfunction in 2023), portal vein thrombosis with mild cirrhosis & chronic pancreatitis c/b ascites s/p venoplasty, CAD s/p PCI x5 on Plavix with subsequent CABG, right proximal ureteral stone s/p right ureteroscopy with laser lithotripsy, cystoscopy, right JJ ureteral stent exchange in 2021, now removed, right hydronephrosis s/p ureteral stent, removed Dec 6/2023.    Intermittent chest pain likely 2/2 uncontrolled HTN. Pending Cardiology appointment.    Renal history  Creatinine   Date Value Ref Range Status   02/12/2024 3.5 (H) 0.5 - 1.4 mg/dL Final   01/18/2024 2.5 (H) 0.5 - 1.4 mg/dL Final   12/28/2023 2.7 (H) 0.5 - 1.4 mg/dL Final   11/29/2023 3.0 (H) 0.5 - 1.4 mg/dL Final   11/08/2023 2.8 (H) 0.5 - 1.4 mg/dL Final   10/30/2023 2.6 (H) 0.5 - 1.4 mg/dL Final   10/17/2023 3.1 (H) 0.5 - 1.4 mg/dL Final   10/16/2023 3.5 (H) 0.5 - 1.4 mg/dL Final   10/16/2023 3.5 (H) 0.5 - 1.4 mg/dL Final   10/15/2023 3.8 (H) 0.5 - 1.4 mg/dL Final   Cr trended up tp 3.5, likely 2/2 uncontrolled HTN (not taking his meds)  Cr trended up to 2.4 then 4.7->hospitalized in Oct 2023, s/p treatment of DKA (insulin + fluid) and right ureteral stent via ureteroscopy (CT Mild right hydronephrosis and right hydroureter with gradual tapering without evidence for ureteral calculus) 10/2023-> Cr trended down to 3.1; epididymitis s/p abx  Cr 1.8-2.2 2020-early 2023  Cr up to 3.5 in 2020, then down to 1.8-2.0 in 03/2020, RUQ pain, s/p Cholecystostomy tube placement   Cr up to 4.9 in 2019, then down to 1.4 02/2020; hospitalization for large amount ascites removal s/p venoplasty   Cr 1.1-1.3 prior to 2019  Prot/Creat Ratio, Urine   Date Value Ref Range Status   02/12/2024 5.37 (H) 0.00 - 0.20 Final   12/28/2023 7.83 (H) 0.00 - 0.20 Final   Urine protein 2+ started 2019  Hep B/C, SPEP, SFLC neg; CAROLYNN, ANCA, C3, C4 neg, HIV,  RPR neg. Mild elevated IgG noted.  Hx of BPH with obstruction from Urology's note; on tamsulosin, NM Renogram With Lasix was ordered.  Has been drinking 50 oz of fluid a day  Not taking NSAIDs    Renal stone  Multiple episodes+ > 20  Right proximal ureteral stone s/p right ureteroscopy with laser lithotripsy, cystoscopy, right JJ ureteral stent exchange in 2021. Stone analysis: 80% Uric acid, 20% Calcium oxalate monohydrate.  S/p uroscopy on 12/6 : Stones were fragmented into dust on basket extraction; Retrograde pyelogram showed delicate calices with no evidence of hydronephrosis  Follows with Dr. Diaz    HTN  164/94 mmHg  Current medication on the list: amlodipine 10 mg, hydralazine 25 mg TID, and torsemide 20 mg daily; however, the patient has not been taking them (states he has been feeling good so wouldn't take them)  Trying to be compliant with low salt diet  Reported weight gain for the past 8 months    Type 2 DM  Lab Results   Component Value Date    HGBA1C 6.7 (H) 01/18/2024   No DM retinopathy (correct from prior chart)  Has been poorly controlled (A1c 11 1 year ago)  With insulin therapy    10/2023 abdominal scan  Nonobstructing calcification at the midpole of the left kidney is again noted.  There is mild perinephric stranding bilaterally, this is mildly more prominent than the prior study and is nonspecific, correlation for renal disease to include UTI/pyelonephritis is needed.  On the left there is no evidence for ureteral calculus or obstructive uropathy.  On the right there is mild right hydronephrosis and mild right hydroureter with tapering of the right ureter, there is no ureteral calculus seen.  These findings may relate to sequelae of recently passed calculus, clinical and historical correlation is needed.  Mild urinary bladder wall thickening may relate to incomplete distention however correlation for UTI/cystitis is needed.    Impression:     Mild right hydronephrosis and right hydroureter with  gradual tapering without evidence for ureteral calculus, these findings may relate to sequelae of recently passed calculus, clinical and historical correlation is needed.     Mild perinephric stranding bilaterally, nonspecific however correlation for UTI/pyelonephritis is needed.     Mild urinary bladder wall thickening may relate to incomplete distention however correlation for UTI/cystitis is needed.    RP US 11/2023  FINDINGS:  Images are degraded secondary to patient positioning and adjacent bowel gas.     Right kidney: The right kidney measures 8.8 cm. No cortical thinning. There is loss of corticomedullary distinction. Resistive index measures 0.83.  No mass. No renal stone. No hydronephrosis.     Left kidney: The left kidney measures 9.9 cm. No cortical thinning. There is loss of corticomedullary distinction. Resistive index measures 0.76.  Junctional defect present which is a normal variant.  No mass. 1.0 cm nonobstructing stone in the midpole.  No hydronephrosis.     The bladder is partially distended at the time of scanning and has an unremarkable appearance.     Hyperechoic area within the liver consistent with artifact seen on CT from 10/12/2023.     Impression:     No hydronephrosis visualized noting images are degraded secondary to patient positioning and adjacent bowel gas.     Findings suggestive of medical renal disease.        Past Medical History:   Diagnosis Date    Alcohol abuse     Anasarca 1/28/2019    Anemia     Anticoagulant long-term use     Arthropathy associated with neurological disorder 9/2/2015    Atherosclerosis     Charcot foot due to diabetes mellitus     Chronic combined systolic and diastolic heart failure 01/29/2019 1-28-19 Left VentricleModerate decreased ejection fraction at 30%. Normal left ventricular cavity size. Normal wall thickness observed. Grade I (mild) left ventricular diastolic dysfunction consistent with impaired relaxation. Normal left atrial pressure. Moderate,  global hypokinesis(see wall scoring diagram). Right VentricleNormal cavity size, wall thickness and ejection fraction. Wall motion n    Chronic pancreatitis 1/28/2019    CKD (chronic kidney disease) stage 3, GFR 30-59 ml/min     CKD (chronic kidney disease) stage 4, GFR 15-29 ml/min     Colon polyps     approx 5 yrs ago    Coronary artery disease due to calcified coronary lesion 05/08/2015    5 stents on ASA      Diabetic polyneuropathy associated with type 2 diabetes mellitus 9/2/2015    Diverticulosis 1/28/2019    DM type 2 with diabetic peripheral neuropathy 2/4/2019    Encounter for blood transfusion     Essential hypertension 1/28/2019    Former smoker 8/26/2015    Healed ulcer of left foot on examination 6/20/2017    Hematuria     Hydrocele     approx 1.5 yrs ago    Hyperphosphatemia     Hypoalbuminemia 2/4/2019    Hypocalcemia     Lymphedema of both lower extremities 1/29/2019    Mixed hyperlipidemia 5/8/2015    Morbid obesity with BMI of 50.0-59.9, adult 5/8/2015    Obstruction of right ureteropelvic junction (UPJ) due to stone 5/24/2021    Onychomycosis of multiple toenails with type 2 diabetes mellitus and peripheral neuropathy 6/20/2017    Perianal cyst     approx 2 yrs ago    Proteinuria     Pseudocyst of pancreas 1/28/2019 1-28-19 Liver has a cirrhotic morphology with no focal lesions.  Significant interval increase in ascites when compared to prior exam which may account for patient's abdominal distension.  Hypodense air-fluid collection along the body of the pancreas which is slightly smaller when compared to prior CT.  Findings may relate to pancreatic necrosis with pancreatic pseudocysts with infected pseudocyst    Skin cancer     skin cancer    Sleep apnea 8/26/2015    Status post bariatric surgery 1/11/2016    Type 2 diabetes mellitus, with long-term current use of insulin 5/8/2015    Urinary tract infection        Past Surgical History:   Procedure Laterality Date    ANGIOGRAPHY N/A 6/28/2019     Procedure: ANGIOGRAM-PV STENT;  Surgeon: [facility] Diagnostic Provider;  Location: [facility] OR;  Service: Radiology;  Laterality: N/A;    ANGIOPLASTY      total x5 stents    COLONOSCOPY N/A 10/6/2015    Procedure: COLONOSCOPY;  Surgeon: Shekhar Richards MD;  Location: [facility] (2ND FLR);  Service: Endoscopy;  Laterality: N/A;  BMI over 55/2nd floor case    5 day hold Plavix, Dr Kwadwo Arroyo    COLONOSCOPY N/A 5/13/2021    Procedure: COLONOSCOPY;  Surgeon: Huan Brumfield MD;  Location: [facility] ENDO;  Service: Endoscopy;  Laterality: N/A;    CORONARY ANGIOGRAPHY Right 3/20/2019    Procedure: ANGIOGRAM, CORONARY ARTERY;  Surgeon: Bob Duque MD;  Location: [facility] CATH LAB;  Service: Cardiology;  Laterality: Right;    CORONARY ARTERY BYPASS GRAFT  2017    x3    CORONARY BYPASS GRAFT ANGIOGRAPHY  3/20/2019    Procedure: Bypass graft study;  Surgeon: Bob Duque MD;  Location: [facility] CATH LAB;  Service: Cardiology;;    CYST REMOVAL      CYSTOSCOPY Right 6/30/2021    Procedure: CYSTOSCOPY;  Surgeon: William Diaz MD;  Location: [facility] OR 1ST FLR;  Service: Urology;  Laterality: Right;    CYSTOSCOPY N/A 10/16/2023    Procedure: CYSTOSCOPY;  Surgeon: Chrystal Dias MD;  Location: [facility] OR 1ST FLR;  Service: Urology;  Laterality: N/A;    CYSTOSCOPY N/A 12/6/2023    Procedure: CYSTOSCOPY;  Surgeon: William Diaz MD;  Location: [facility] OR 1ST FLR;  Service: Urology;  Laterality: N/A;    CYSTOSCOPY W/ URETERAL STENT PLACEMENT Right 6/16/2021    Procedure: CYSTOSCOPY, WITH URETERAL STENT INSERTION;  Surgeon: William Diaz MD;  Location: [facility] OR 2ND FLR;  Service: Urology;  Laterality: Right;  FLUORO LESS THAN 1 HOUR    ENDOSCOPIC ULTRASOUND OF UPPER GASTROINTESTINAL TRACT N/A 2/26/2020    Procedure: ULTRASOUND, UPPER GI TRACT, ENDOSCOPIC;  Surgeon: Robbie Yang MD;  Location: [facility];  Service: Endoscopy;  Laterality: N/A;    ESOPHAGOGASTRODUODENOSCOPY N/A 7/8/2019    Procedure: ESOPHAGOGASTRODUODENOSCOPY (EGD);   Surgeon: Huan Brumfield MD;  Location: [facility] ENDO;  Service: Endoscopy;  Laterality: N/A;    ESOPHAGOGASTRODUODENOSCOPY N/A 5/13/2021    Procedure: EGD (ESOPHAGOGASTRODUODENOSCOPY);  Surgeon: Huan Brumfield MD;  Location: [facility] ENDO;  Service: Endoscopy;  Laterality: N/A;    EXCISION OF HYDROCELE Bilateral 6/16/2021    Procedure: HYDROCELE REPAIR;  Surgeon: William Diaz MD;  Location: [facility] OR 2ND FLR;  Service: Urology;  Laterality: Bilateral;  2 HOURS    EXTRACTION - STONE Right 12/6/2023    Procedure: EXTRACTION - STONE;  Surgeon: William Diaz MD;  Location: [facility] OR 1ST FLR;  Service: Urology;  Laterality: Right;    FLUOROSCOPY N/A 6/16/2021    Procedure: FLUOROSCOPY;  Surgeon: William Diaz MD;  Location: [facility] OR 2ND FLR;  Service: Urology;  Laterality: N/A;    GASTRECTOMY      INSERTION OF DIALYSIS CATHETER N/A 5/17/2019    Procedure: pleurx;  Surgeon: [facility];  Location: [facility] OR 2ND FLR;  Service: General;  Laterality: N/A;  Room 188/[facility]    KNEE ARTHROSCOPY      LAPAROSCOPIC CHOLECYSTECTOMY N/A 5/4/2020    Procedure: CHOLECYSTECTOMY, LAPAROSCOPIC;  Surgeon: SON Rowe MD;  Location: [facility] OR;  Service: General;  Laterality: N/A;    LASER LITHOTRIPSY N/A 6/30/2021    Procedure: LITHOTRIPSY, USING LASER;  Surgeon: William Diaz MD;  Location: [facility] OR 1ST FLR;  Service: Urology;  Laterality: N/A;    LIVER BIOPSY N/A 5/4/2020    Procedure: BIOPSY, LIVER, Laproscopic ;  Surgeon: SON Rowe MD;  Location: [facility] OR;  Service: General;  Laterality: N/A;    perianal surgery      perianal cyst removed    PYELOSCOPY Right 6/30/2021    Procedure: PYELOSCOPY;  Surgeon: William Diaz MD;  Location: [facility] OR 1ST FLR;  Service: Urology;  Laterality: Right;    PYELOSCOPY Right 10/16/2023    Procedure: PYELOSCOPY;  Surgeon: Chrystal Dias MD;  Location: [facility] OR 1ST FLR;  Service: Urology;  Laterality: Right;    PYELOSCOPY Right 12/6/2023    Procedure: PYELOSCOPY;   Surgeon: William Diaz MD;  Location: [facility] OR 1ST FLR;  Service: Urology;  Laterality: Right;    REMOVAL-STENT Right 2023    Procedure: REMOVAL-STENT;  Surgeon: William Diaz MD;  Location: [facility] OR 1ST FLR;  Service: Urology;  Laterality: Right;    REPLACEMENT OF STENT Right 2021    Procedure: REPLACEMENT, STENT;  Surgeon: William Diaz MD;  Location: [facility] OR 1ST FLR;  Service: Urology;  Laterality: Right;    RETROGRADE PYELOGRAPHY Right 10/16/2023    Procedure: PYELOGRAM, RETROGRADE;  Surgeon: Chrystal Dias MD;  Location: [facility] OR 1ST FLR;  Service: Urology;  Laterality: Right;    RETROGRADE PYELOGRAPHY Right 2023    Procedure: PYELOGRAM, RETROGRADE;  Surgeon: William Diaz MD;  Location: [facility] OR 1ST FLR;  Service: Urology;  Laterality: Right;    URETERAL STENT PLACEMENT Right 10/16/2023    Procedure: INSERTION, STENT, URETER;  Surgeon: Chrystal Dias MD;  Location: [facility] OR 1ST FLR;  Service: Urology;  Laterality: Right;    URETEROSCOPY Right 2021    Procedure: URETEROSCOPY FLEXIBLE URETEROSCOPE;  Surgeon: William Diaz MD;  Location: [facility] OR 1ST FLR;  Service: Urology;  Laterality: Right;    URETEROSCOPY Right 10/16/2023    Procedure: URETEROSCOPY;  Surgeon: Chrystal Dias MD;  Location: [facility] OR 1ST FLR;  Service: Urology;  Laterality: Right;    URETEROSCOPY Right 2023    Procedure: URETEROSCOPY;  Surgeon: William Diaz MD;  Location: [facility] OR 1ST FLR;  Service: Urology;  Laterality: Right;       Review of patient's allergies indicates:  No Known Allergies      Social History     Socioeconomic History    Marital status:    Tobacco Use    Smoking status: Former     Current packs/day: 0.00     Average packs/day: 2.0 packs/day for 30.0 years (60.0 ttl pk-yrs)     Types: Cigarettes     Start date: 1975     Quit date: 2005     Years since quittin.0    Smokeless tobacco: Never   Substance and Sexual Activity    Alcohol use: No     Comment:  started ~2014, reports 1 shot daily, max 3 shots daily, vague about alcohol consumption. Last drink 9/2018    Drug use: No     Social Determinants of Health     Financial Resource Strain: Low Risk  (10/13/2023)    Overall Financial Resource Strain (CARDIA)     Difficulty of Paying Living Expenses: Not very hard   Physical Activity: Unknown (10/13/2023)    Exercise Vital Sign     Days of Exercise per Week: 7 days     Minutes of Exercise per Session: Patient declined   Stress: Patient Declined (10/13/2023)    [employer] - Occupational Stress Questionnaire     Feeling of Stress : Patient declined   Social Connections: Moderately Isolated (10/13/2023)    Social Connection and Isolation Panel [NHANES]     Frequency of Communication with Friends and Family: More than three times a week     Frequency of Social Gatherings with Friends and Family: More than three times a week     Attends Je[employer] witness Services: Never     Active Member of Clubs or Organizations: No     Attends Club or Organization Meetings: Never     Marital Status:        Family History   Problem Relation Age of Onset    Cancer Mother     Cancer Father     Heart disease Father     Obesity Sister     Parkinsonism Brother     No Known Problems Paternal Grandmother     Cancer Paternal Grandfather     Cancer Brother     Diabetes Maternal Grandmother     Stroke Maternal Grandfather     Cirrhosis Neg Hx        ROS negative except listed above    PHYSICAL EXAMINATION:  Blood pressure (!) 167/94, pulse 87, weight (!) 137 kg (302 lb 0.5 oz), SpO2 98 %.  Constitutional - No acute distress  HEENT - Grossly normal  Neck - supple.   Cardiovascular - JVP mild distended around 8 cm  Respiratory - Distant breathign sound due to body habitus  Musculoskeletal - Peripheral edema 2+ (chronic edema for years, but can be superimposed by proteinuria)  Dermatologic/Skin - Skin warm and dry.  No rashes.    Neurologic - No acute neurological  deficit  Psychiatric - AAOx3    Assessment and Plan  ELIEZER vs CKD stage 4 with rapid progression 2/2 uncontrolled HTN (due to medication noncompliance)       Proteinuria    Urine Protein Creatinine Ratios:    Prot/Creat Ratio, Urine   Date Value Ref Range Status   02/12/2024 5.37 (H) 0.00 - 0.20 Final   12/28/2023 7.83 (H) 0.00 - 0.20 Final   11/29/2023 3.76 (H) 0.00 - 0.20 Final         Acid-Base:   Lab Results   Component Value Date     02/12/2024    K 4.7 02/12/2024    CO2 20 (L) 02/12/2024     -Etiology of CKD: multiple ELIEZER in the past (most recent one 2/2 DKA, obstructive uropathy), HTN, DM, hx of CHF  -Hypoalbuminemia is likely chronic due to underlying pancreatitis/PVT/cirrhosis rather than proteinuria. Hep B/C, SPEP, SFLC neg; CAROLYNN, ANCA, C3, C4 neg, HIV, RPR neg. Mild elevated IgG noted.  -Counseled to avoid NSAIDs  -Adequate fluid intake 45-50 oz a day  -Hold adding ACEI/ARB and SGLT2 given ELIEZER  -Continue baking soda one tea spoon two times a day  -Continues to follow with Urology for right hydroureter/hydronephrosis s/p stent  -Counseled the patient extensively regarding the importance of HTN, DM and CHF control; without taking medications for these, he will be needing RRT in the near future  -Will refer for CKD education class/RRT options next visit    2. HTN: BP goal 130/80 mmHg  -Counseled for low salt diet  -Counseled the patient to check BP at home  -Consider Amlodipine 10 mg, hydralazine 25 mg TID, torsemide 20 mg daily (medications were ordered on 1/4, sent to [facility] pharmacy)  -He might have missed the window of adding farxiga/jardiance. Given the dietary noncompliance, it can be difficult to resume ACEI/ARB.  -Counseled for medication compliance    3. Nephrolithiasis  Continue to hold potassium citrate 10 meq TID due to recent hyperK and elevated Cr.  Will continue to monitor    4. Secondary hyperparathyroidism:   Lab Results   Component Value Date    .1 (H) 11/08/2023    CALCIUM 8.3 (L)  02/12/2024    CALCIUM 8.1 (L) 01/18/2024    CAION 1.13 03/15/2019    PHOS 4.2 02/12/2024    PHOS 4.2 02/12/2024     Vit D, 25-Hydroxy   Date Value Ref Range Status   02/12/2024 14 (L) 30 - 96 ng/mL Final     Comment:     Vitamin D deficiency.........<10 ng/mL                              Vitamin D insufficiency......10-29 ng/mL       Vitamin D sufficiency........> or equal to 30 ng/mL  Vitamin D toxicity............>100 ng/mL     Albumin 2.8, corrected calcium 9.3 WNL  PTH 2x upper limit at the lower side for CKD patient  Vit D 50,000 units weekly x12 ordered    5. Anemia:   Lab Results   Component Value Date    HGB 13.1 (L) 02/12/2024    FERRITIN 124 11/08/2023    IRON 120 11/08/2023    TRANSFERRIN 205 11/08/2023    TIBC 303 11/08/2023    FESATURATED 40 11/08/2023   Will continue to monitor    6. Type 2 DM:  Last HbA1C   Lab Results   Component Value Date    HGBA1C 6.7 (H) 01/18/2024     Counseled the patient regarding the importance of sugar control to slow CKD progression     7. HFrEF & HFpEF  Counseled the importance of fluid control/taking torsemide. Adequate CHF control can slow the progression of CKD.    Follow up in 3 weeks

## 2024-02-18 DIAGNOSIS — E13.9 LADA (LATENT AUTOIMMUNE DIABETES IN ADULTS), MANAGED AS TYPE 1: Primary | ICD-10-CM

## 2024-02-19 ENCOUNTER — OFFICE VISIT (OUTPATIENT)
Dept: NEPHROLOGY | Facility: CLINIC | Age: 69
End: 2024-02-19
Payer: MEDICARE

## 2024-02-19 VITALS
HEART RATE: 87 BPM | DIASTOLIC BLOOD PRESSURE: 94 MMHG | BODY MASS INDEX: 47.3 KG/M2 | OXYGEN SATURATION: 98 % | SYSTOLIC BLOOD PRESSURE: 167 MMHG | WEIGHT: 302 LBS

## 2024-02-19 DIAGNOSIS — E55.9 VITAMIN D DEFICIENCY, UNSPECIFIED: ICD-10-CM

## 2024-02-19 DIAGNOSIS — N20.0 NEPHROLITHIASIS: ICD-10-CM

## 2024-02-19 DIAGNOSIS — N18.4 TYPE 2 DIABETES MELLITUS WITH STAGE 4 CHRONIC KIDNEY DISEASE, UNSPECIFIED WHETHER LONG TERM INSULIN USE: ICD-10-CM

## 2024-02-19 DIAGNOSIS — E66.01 SEVERE OBESITY (BMI >= 40): ICD-10-CM

## 2024-02-19 DIAGNOSIS — N25.81 SECONDARY HYPERPARATHYROIDISM: ICD-10-CM

## 2024-02-19 DIAGNOSIS — N18.4 CHRONIC KIDNEY DISEASE, STAGE 4 (SEVERE): Primary | ICD-10-CM

## 2024-02-19 DIAGNOSIS — N17.9 ACUTE KIDNEY INJURY: ICD-10-CM

## 2024-02-19 DIAGNOSIS — I10 HYPERTENSION, UNSPECIFIED TYPE: ICD-10-CM

## 2024-02-19 DIAGNOSIS — E87.20 METABOLIC ACIDOSIS: ICD-10-CM

## 2024-02-19 DIAGNOSIS — E11.22 TYPE 2 DIABETES MELLITUS WITH STAGE 4 CHRONIC KIDNEY DISEASE, UNSPECIFIED WHETHER LONG TERM INSULIN USE: ICD-10-CM

## 2024-02-19 DIAGNOSIS — I50.43 ACUTE ON CHRONIC COMBINED SYSTOLIC AND DIASTOLIC HEART FAILURE: ICD-10-CM

## 2024-02-19 PROCEDURE — 3044F HG A1C LEVEL LT 7.0%: CPT | Mod: CPTII,S$GLB,, | Performed by: INTERNAL MEDICINE

## 2024-02-19 PROCEDURE — 1160F RVW MEDS BY RX/DR IN RCRD: CPT | Mod: CPTII,S$GLB,, | Performed by: INTERNAL MEDICINE

## 2024-02-19 PROCEDURE — 3288F FALL RISK ASSESSMENT DOCD: CPT | Mod: CPTII,S$GLB,, | Performed by: INTERNAL MEDICINE

## 2024-02-19 PROCEDURE — 3066F NEPHROPATHY DOC TX: CPT | Mod: CPTII,S$GLB,, | Performed by: INTERNAL MEDICINE

## 2024-02-19 PROCEDURE — 2023F DILAT RTA XM W/O RTNOPTHY: CPT | Mod: CPTII,S$GLB,, | Performed by: INTERNAL MEDICINE

## 2024-02-19 PROCEDURE — 1101F PT FALLS ASSESS-DOCD LE1/YR: CPT | Mod: CPTII,S$GLB,, | Performed by: INTERNAL MEDICINE

## 2024-02-19 PROCEDURE — 1126F AMNT PAIN NOTED NONE PRSNT: CPT | Mod: CPTII,S$GLB,, | Performed by: INTERNAL MEDICINE

## 2024-02-19 PROCEDURE — 3077F SYST BP >= 140 MM HG: CPT | Mod: CPTII,S$GLB,, | Performed by: INTERNAL MEDICINE

## 2024-02-19 PROCEDURE — 99999 PR PBB SHADOW E&M-EST. PATIENT-LVL IV: CPT | Mod: PBBFAC,,, | Performed by: INTERNAL MEDICINE

## 2024-02-19 PROCEDURE — 3062F POS MACROALBUMINURIA REV: CPT | Mod: CPTII,S$GLB,, | Performed by: INTERNAL MEDICINE

## 2024-02-19 PROCEDURE — 3080F DIAST BP >= 90 MM HG: CPT | Mod: CPTII,S$GLB,, | Performed by: INTERNAL MEDICINE

## 2024-02-19 PROCEDURE — 1159F MED LIST DOCD IN RCRD: CPT | Mod: CPTII,S$GLB,, | Performed by: INTERNAL MEDICINE

## 2024-02-19 PROCEDURE — 99214 OFFICE O/P EST MOD 30 MIN: CPT | Mod: S$GLB,,, | Performed by: INTERNAL MEDICINE

## 2024-02-19 PROCEDURE — 3008F BODY MASS INDEX DOCD: CPT | Mod: CPTII,S$GLB,, | Performed by: INTERNAL MEDICINE

## 2024-02-19 RX ORDER — ERGOCALCIFEROL 1.25 MG/1
50000 CAPSULE ORAL
Qty: 12 CAPSULE | Refills: 0 | Status: SHIPPED | OUTPATIENT
Start: 2024-02-19 | End: 2024-05-07

## 2024-02-19 RX ORDER — INSULIN PMP CART,AUT,G6/7,CNTR
EACH SUBCUTANEOUS
Qty: 15 EACH | Refills: 1 | Status: SHIPPED | OUTPATIENT
Start: 2024-02-19 | End: 2024-04-22

## 2024-02-23 ENCOUNTER — OFFICE VISIT (OUTPATIENT)
Dept: INTERNAL MEDICINE | Facility: CLINIC | Age: 69
End: 2024-02-23
Payer: MEDICARE

## 2024-02-23 VITALS
WEIGHT: 302.25 LBS | TEMPERATURE: 97 F | HEIGHT: 67 IN | DIASTOLIC BLOOD PRESSURE: 80 MMHG | HEART RATE: 76 BPM | OXYGEN SATURATION: 98 % | SYSTOLIC BLOOD PRESSURE: 138 MMHG | BODY MASS INDEX: 47.44 KG/M2

## 2024-02-23 DIAGNOSIS — Z95.1 HX OF CABG: ICD-10-CM

## 2024-02-23 DIAGNOSIS — E13.9 LADA (LATENT AUTOIMMUNE DIABETES IN ADULTS), MANAGED AS TYPE 1: ICD-10-CM

## 2024-02-23 DIAGNOSIS — E11.40 TYPE 2 DIABETES MELLITUS WITH DIABETIC NEUROPATHY, WITH LONG-TERM CURRENT USE OF INSULIN: Chronic | ICD-10-CM

## 2024-02-23 DIAGNOSIS — I81 PORTAL HYPERTENSION DUE TO OBSTRUCTION OF EXTRAHEPATIC PORTAL VEIN: Chronic | ICD-10-CM

## 2024-02-23 DIAGNOSIS — Z96.41 DIABETES MELLITUS TYPE 1, WITH COMPLICATION, ON LONG TERM INSULIN PUMP: ICD-10-CM

## 2024-02-23 DIAGNOSIS — I48.0 PAROXYSMAL ATRIAL FIBRILLATION: ICD-10-CM

## 2024-02-23 DIAGNOSIS — E78.5 HYPERLIPIDEMIA ASSOCIATED WITH TYPE 2 DIABETES MELLITUS: Chronic | ICD-10-CM

## 2024-02-23 DIAGNOSIS — Z91.199 MEDICALLY NONCOMPLIANT: ICD-10-CM

## 2024-02-23 DIAGNOSIS — I89.0 LYMPHEDEMA OF BOTH LOWER EXTREMITIES: Chronic | ICD-10-CM

## 2024-02-23 DIAGNOSIS — Z79.4 TYPE 2 DIABETES MELLITUS WITH STAGE 4 CHRONIC KIDNEY DISEASE, WITH LONG-TERM CURRENT USE OF INSULIN: ICD-10-CM

## 2024-02-23 DIAGNOSIS — E11.22 TYPE 2 DIABETES MELLITUS WITH STAGE 4 CHRONIC KIDNEY DISEASE, WITH LONG-TERM CURRENT USE OF INSULIN: ICD-10-CM

## 2024-02-23 DIAGNOSIS — N18.4 STAGE 4 CHRONIC KIDNEY DISEASE: ICD-10-CM

## 2024-02-23 DIAGNOSIS — K76.6 PORTAL HYPERTENSION DUE TO OBSTRUCTION OF EXTRAHEPATIC PORTAL VEIN: Chronic | ICD-10-CM

## 2024-02-23 DIAGNOSIS — I25.84 CORONARY ARTERY DISEASE DUE TO CALCIFIED CORONARY LESION: Chronic | ICD-10-CM

## 2024-02-23 DIAGNOSIS — E11.69 HYPERLIPIDEMIA ASSOCIATED WITH TYPE 2 DIABETES MELLITUS: Chronic | ICD-10-CM

## 2024-02-23 DIAGNOSIS — I25.10 CORONARY ARTERY DISEASE DUE TO CALCIFIED CORONARY LESION: Chronic | ICD-10-CM

## 2024-02-23 DIAGNOSIS — D64.9 ANEMIA, UNSPECIFIED TYPE: ICD-10-CM

## 2024-02-23 DIAGNOSIS — E10.8 DIABETES MELLITUS TYPE 1, WITH COMPLICATION, ON LONG TERM INSULIN PUMP: ICD-10-CM

## 2024-02-23 DIAGNOSIS — Z98.84 STATUS POST BARIATRIC SURGERY: ICD-10-CM

## 2024-02-23 DIAGNOSIS — E11.42 DIABETIC POLYNEUROPATHY ASSOCIATED WITH TYPE 2 DIABETES MELLITUS: ICD-10-CM

## 2024-02-23 DIAGNOSIS — Z79.4 TYPE 2 DIABETES MELLITUS WITH DIABETIC NEUROPATHY, WITH LONG-TERM CURRENT USE OF INSULIN: Chronic | ICD-10-CM

## 2024-02-23 DIAGNOSIS — I70.0 AORTIC ATHEROSCLEROSIS: ICD-10-CM

## 2024-02-23 DIAGNOSIS — E66.2 OBESITY HYPOVENTILATION SYNDROME: Chronic | ICD-10-CM

## 2024-02-23 DIAGNOSIS — I70.0 ATHEROSCLEROSIS OF AORTA: ICD-10-CM

## 2024-02-23 DIAGNOSIS — K74.00 HEPATIC FIBROSIS: ICD-10-CM

## 2024-02-23 DIAGNOSIS — K86.1 OTHER CHRONIC PANCREATITIS: ICD-10-CM

## 2024-02-23 DIAGNOSIS — G47.30 SLEEP APNEA, UNSPECIFIED TYPE: ICD-10-CM

## 2024-02-23 DIAGNOSIS — I87.2 VENOUS STASIS DERMATITIS OF BOTH LOWER EXTREMITIES: ICD-10-CM

## 2024-02-23 DIAGNOSIS — I87.2 VENOUS INSUFFICIENCY OF BOTH LOWER EXTREMITIES: ICD-10-CM

## 2024-02-23 DIAGNOSIS — N18.4 TYPE 2 DIABETES MELLITUS WITH STAGE 4 CHRONIC KIDNEY DISEASE, WITH LONG-TERM CURRENT USE OF INSULIN: ICD-10-CM

## 2024-02-23 DIAGNOSIS — I50.42 CHRONIC COMBINED SYSTOLIC AND DIASTOLIC CONGESTIVE HEART FAILURE: ICD-10-CM

## 2024-02-23 DIAGNOSIS — Z00.00 ANNUAL PHYSICAL EXAM: Primary | ICD-10-CM

## 2024-02-23 PROBLEM — I50.43 ACUTE ON CHRONIC COMBINED SYSTOLIC AND DIASTOLIC HEART FAILURE: Status: RESOLVED | Noted: 2019-01-29 | Resolved: 2024-02-23

## 2024-02-23 PROBLEM — N18.30 TYPE 2 DIABETES MELLITUS WITH STAGE 3 CHRONIC KIDNEY DISEASE, WITH LONG-TERM CURRENT USE OF INSULIN: Status: RESOLVED | Noted: 2019-11-15 | Resolved: 2024-02-23

## 2024-02-23 PROBLEM — N18.30 CHRONIC KIDNEY DISEASE, STAGE 3: Chronic | Status: RESOLVED | Noted: 2019-07-23 | Resolved: 2024-02-23

## 2024-02-23 PROCEDURE — 99397 PER PM REEVAL EST PAT 65+ YR: CPT | Mod: S$GLB,,, | Performed by: INTERNAL MEDICINE

## 2024-02-23 PROCEDURE — 1126F AMNT PAIN NOTED NONE PRSNT: CPT | Mod: CPTII,S$GLB,, | Performed by: INTERNAL MEDICINE

## 2024-02-23 PROCEDURE — 3288F FALL RISK ASSESSMENT DOCD: CPT | Mod: CPTII,S$GLB,, | Performed by: INTERNAL MEDICINE

## 2024-02-23 PROCEDURE — 1159F MED LIST DOCD IN RCRD: CPT | Mod: CPTII,S$GLB,, | Performed by: INTERNAL MEDICINE

## 2024-02-23 PROCEDURE — 1160F RVW MEDS BY RX/DR IN RCRD: CPT | Mod: CPTII,S$GLB,, | Performed by: INTERNAL MEDICINE

## 2024-02-23 PROCEDURE — 3044F HG A1C LEVEL LT 7.0%: CPT | Mod: CPTII,S$GLB,, | Performed by: INTERNAL MEDICINE

## 2024-02-23 PROCEDURE — 3079F DIAST BP 80-89 MM HG: CPT | Mod: CPTII,S$GLB,, | Performed by: INTERNAL MEDICINE

## 2024-02-23 PROCEDURE — 3066F NEPHROPATHY DOC TX: CPT | Mod: CPTII,S$GLB,, | Performed by: INTERNAL MEDICINE

## 2024-02-23 PROCEDURE — 99999 PR PBB SHADOW E&M-EST. PATIENT-LVL IV: CPT | Mod: PBBFAC,,, | Performed by: INTERNAL MEDICINE

## 2024-02-23 PROCEDURE — 3075F SYST BP GE 130 - 139MM HG: CPT | Mod: CPTII,S$GLB,, | Performed by: INTERNAL MEDICINE

## 2024-02-23 PROCEDURE — 3062F POS MACROALBUMINURIA REV: CPT | Mod: CPTII,S$GLB,, | Performed by: INTERNAL MEDICINE

## 2024-02-23 PROCEDURE — 3008F BODY MASS INDEX DOCD: CPT | Mod: CPTII,S$GLB,, | Performed by: INTERNAL MEDICINE

## 2024-02-23 PROCEDURE — 1101F PT FALLS ASSESS-DOCD LE1/YR: CPT | Mod: CPTII,S$GLB,, | Performed by: INTERNAL MEDICINE

## 2024-02-23 PROCEDURE — 2023F DILAT RTA XM W/O RTNOPTHY: CPT | Mod: CPTII,S$GLB,, | Performed by: INTERNAL MEDICINE

## 2024-02-23 NOTE — PROGRESS NOTES
Subjective     Patient ID: Jian Arrieta is a 68 y.o. male.    Chief Complaint: Annual Exam    HPI  66 y.o. Male here for annual exam. Pt has not been taking any of his medications except aspirin.      Vaccines: Influenza (2020); Tetanus (2017); Pneumococcal 23 (2020); Shingrix (will consider)  Eye exam: 6/21  Colonoscopy: 5/21     Exercise: no  Diet: regular     Past Medical History:  No date: Alcohol abuse  1/28/2019: Anasarca  No date: Anemia  No date: Anticoagulant long-term use  9/2/2015: Arthropathy associated with neurological disorder  No date: Atherosclerosis  No date: Charcot foot due to diabetes mellitus  01/29/2019: Chronic combined systolic and diastolic heart failure      Comment:  1-28-19 Left VentricleModerate decreased ejection                fraction at 30%. Normal left ventricular cavity size.                Normal wall thickness observed. Grade I (mild) left                ventricular diastolic dysfunction consistent with                impaired relaxation. Normal left atrial pressure.                Moderate, global hypokinesis(see wall scoring diagram).                Right VentricleNormal cavity size, wall thickness and                ejection fraction. Wall motion n  1/28/2019: Chronic pancreatitis  No date: CKD (chronic kidney disease) stage 3, GFR 30-59 ml/min  No date: CKD (chronic kidney disease) stage 4, GFR 15-29 ml/min  No date: Colon polyps      Comment:  approx 5 yrs ago  05/08/2015: Coronary artery disease due to calcified coronary lesion      Comment:  5 stents on ASA    9/2/2015: Diabetic polyneuropathy associated with type 2 diabetes   mellitus  1/28/2019: Diverticulosis  2/4/2019: DM type 2 with diabetic peripheral neuropathy  No date: Encounter for blood transfusion  1/28/2019: Essential hypertension  8/26/2015: Former smoker  6/20/2017: Healed ulcer of left foot on examination  No date: Hematuria  No date: Hydrocele      Comment:  approx 1.5 yrs ago  No date:  Hyperphosphatemia  2/4/2019: Hypoalbuminemia  No date: Hypocalcemia  1/29/2019: Lymphedema of both lower extremities  5/8/2015: Mixed hyperlipidemia  5/8/2015: Morbid obesity with BMI of 50.0-59.9, adult  5/24/2021: Obstruction of right ureteropelvic junction (UPJ) due to   stone  6/20/2017: Onychomycosis of multiple toenails with type 2 diabetes   mellitus and peripheral neuropathy  No date: Perianal cyst      Comment:  approx 2 yrs ago  No date: Proteinuria  1/28/2019: Pseudocyst of pancreas      Comment:  1-28-19 Liver has a cirrhotic morphology with no focal                lesions.  Significant interval increase in ascites when                compared to prior exam which may account for patient's                abdominal distension.  Hypodense air-fluid collection                along the body of the pancreas which is slightly smaller                when compared to prior CT.  Findings may relate to                pancreatic necrosis with pancreatic pseudocysts with                infected pseudocyst  No date: Skin cancer      Comment:  skin cancer  8/26/2015: Sleep apnea  1/11/2016: Status post bariatric surgery  5/8/2015: Type 2 diabetes mellitus, with long-term current use of   insulin  No date: Urinary tract infection  Past Surgical History:  6/28/2019: ANGIOGRAPHY; N/A      Comment:  Procedure: ANGIOGRAM-PV STENT;  Surgeon: [facility]               Provider;  Location: [facility] OR;  Service: Radiology;                 Laterality: N/A;  No date: ANGIOPLASTY      Comment:  total x5 stents  10/6/2015: COLONOSCOPY; N/A      Comment:  Procedure: COLONOSCOPY;  Surgeon: Shekhar Richards MD;                 Location: [facility] ENDO (2ND FLR);  Service: Endoscopy;                 Laterality: N/A;  BMI over 55/2nd floor case5 day                hold Plavix, Dr Kwadwo Arroyo  5/13/2021: COLONOSCOPY; N/A      Comment:  Procedure: COLONOSCOPY;  Surgeon: Huan Brumfield MD;                Location: [facility] ENDO;  Service:  Endoscopy;  Laterality:                N/A;  3/20/2019: CORONARY ANGIOGRAPHY; Right      Comment:  Procedure: ANGIOGRAM, CORONARY ARTERY;  Surgeon: Bob Duque MD;  Location: [facility] CATH LAB;  Service:                Cardiology;  Laterality: Right;  2017: CORONARY ARTERY BYPASS GRAFT      Comment:  x3  3/20/2019: CORONARY BYPASS GRAFT ANGIOGRAPHY      Comment:  Procedure: Bypass graft study;  Surgeon: Bob Duque MD;  Location: [facility] CATH LAB;  Service:                Cardiology;;  No date: CYST REMOVAL  6/30/2021: CYSTOSCOPY; Right      Comment:  Procedure: CYSTOSCOPY;  Surgeon: William Diaz MD;                 Location: [facility] OR 1ST FLR;  Service: Urology;                 Laterality: Right;  10/16/2023: CYSTOSCOPY; N/A      Comment:  Procedure: CYSTOSCOPY;  Surgeon: Chrystal Dias MD;  Location: [facility] OR 1ST FLR;  Service: Urology;                 Laterality: N/A;  12/6/2023: CYSTOSCOPY; N/A      Comment:  Procedure: CYSTOSCOPY;  Surgeon: William Diaz MD;                 Location: [facility] OR 1ST FLR;  Service: Urology;                 Laterality: N/A;  6/16/2021: CYSTOSCOPY W/ URETERAL STENT PLACEMENT; Right      Comment:  Procedure: CYSTOSCOPY, WITH URETERAL STENT INSERTION;                 Surgeon: William Diaz MD;  Location: [facility] OR 2ND FLR;                 Service: Urology;  Laterality: Right;  FLUORO LESS THAN 1               HOUR  2/26/2020: ENDOSCOPIC ULTRASOUND OF UPPER GASTROINTESTINAL TRACT; N/A      Comment:  Procedure: ULTRASOUND, UPPER GI TRACT, ENDOSCOPIC;                 Surgeon: Robbie Yang MD;  Location: [facility];                 Service: Endoscopy;  Laterality: N/A;  7/8/2019: ESOPHAGOGASTRODUODENOSCOPY; N/A      Comment:  Procedure: ESOPHAGOGASTRODUODENOSCOPY (EGD);  Surgeon:                Huan Brumfield MD;  Location: [facility] ENDO;  Service:                Endoscopy;  Laterality: N/A;  5/13/2021:  ESOPHAGOGASTRODUODENOSCOPY; N/A      Comment:  Procedure: EGD (ESOPHAGOGASTRODUODENOSCOPY);  Surgeon:                Huan Brumfield MD;  Location: [facility] ENDO;  Service:                Endoscopy;  Laterality: N/A;  6/16/2021: EXCISION OF HYDROCELE; Bilateral      Comment:  Procedure: HYDROCELE REPAIR;  Surgeon: William Diaz MD;  Location: [facility] OR 2ND FLR;  Service: Urology;                 Laterality: Bilateral;  2 HOURS  12/6/2023: EXTRACTION - STONE; Right      Comment:  Procedure: EXTRACTION - STONE;  Surgeon: William Diaz MD;  Location: [facility] OR 1ST FLR;  Service: Urology;                 Laterality: Right;  6/16/2021: FLUOROSCOPY; N/A      Comment:  Procedure: FLUOROSCOPY;  Surgeon: William Diaz MD;                 Location: [facility] OR 2ND FLR;  Service: Urology;                 Laterality: N/A;  No date: GASTRECTOMY  5/17/2019: INSERTION OF DIALYSIS CATHETER; N/A      Comment:  Procedure: pleurx;  Surgeon: [facility] Provider;                 Location: [facility] OR 2ND FLR;  Service: General;                 Laterality: N/A;  Room [address] [place] [address]  No date: KNEE ARTHROSCOPY  5/4/2020: LAPAROSCOPIC CHOLECYSTECTOMY; N/A      Comment:  Procedure: CHOLECYSTECTOMY, LAPAROSCOPIC;  Surgeon: SON Rowe MD;  Location: [facility];  Service:                General;  Laterality: N/A;  6/30/2021: LASER LITHOTRIPSY; N/A      Comment:  Procedure: LITHOTRIPSY, USING LASER;  Surgeon: William Diaz MD;  Location: [facility] OR 1ST FLR;  Service: Urology;                 Laterality: N/A;  5/4/2020: LIVER BIOPSY; N/A      Comment:  Procedure: BIOPSY, LIVER, Laproscopic ;  Surgeon: SON Rowe MD;  Location: [facility] OR;  Service:                General;  Laterality: N/A;  No date: perianal surgery      Comment:  perianal cyst removed  6/30/2021: PYELOSCOPY; Right      Comment:  Procedure: PYELOSCOPY;  Surgeon: William Diaz MD;                  Location: [facility] OR 1ST FLR;  Service: Urology;                 Laterality: Right;  10/16/2023: PYELOSCOPY; Right      Comment:  Procedure: PYELOSCOPY;  Surgeon: Chrystal Dias MD;  Location: [facility] OR 1ST FLR;  Service: Urology;                 Laterality: Right;  12/6/2023: PYELOSCOPY; Right      Comment:  Procedure: PYELOSCOPY;  Surgeon: William Diaz MD;                 Location: [facility] OR 1ST FLR;  Service: Urology;                 Laterality: Right;  12/6/2023: REMOVAL-STENT; Right      Comment:  Procedure: REMOVAL-STENT;  Surgeon: William iDaz MD;                Location: [facility] OR 1ST FLR;  Service: Urology;                 Laterality: Right;  6/30/2021: REPLACEMENT OF STENT; Right      Comment:  Procedure: REPLACEMENT, STENT;  Surgeon: William Diaz MD;  Location: [facility] OR 1ST FLR;  Service: Urology;                 Laterality: Right;  10/16/2023: RETROGRADE PYELOGRAPHY; Right      Comment:  Procedure: PYELOGRAM, RETROGRADE;  Surgeon: Chrystal Dias MD;  Location: [facility] OR 1ST FLR;  Service:                Urology;  Laterality: Right;  12/6/2023: RETROGRADE PYELOGRAPHY; Right      Comment:  Procedure: PYELOGRAM, RETROGRADE;  Surgeon: William Diaz MD;  Location: [facility] OR 1ST FLR;  Service: Urology;                 Laterality: Right;  10/16/2023: URETERAL STENT PLACEMENT; Right      Comment:  Procedure: INSERTION, STENT, URETER;  Surgeon:                Chrystal Dias MD;  Location: [facility] OR 1ST FLR;                 Service: Urology;  Laterality: Right;  6/30/2021: URETEROSCOPY; Right      Comment:  Procedure: URETEROSCOPY FLEXIBLE URETEROSCOPE;  Surgeon:               William Diaz MD;  Location: [facility] OR 1ST FLR;  Service:                Urology;  Laterality: Right;  10/16/2023: URETEROSCOPY; Right      Comment:  Procedure: URETEROSCOPY;  Surgeon: Chrystal Dias MD;  Location: [facility] OR 1ST FLR;   Service: Urology;                 Laterality: Right;  2023: URETEROSCOPY; Right      Comment:  Procedure: URETEROSCOPY;  Surgeon: William Diaz MD;                 Location: [facility] OR [address] [place] [address];  Service: Urology;                 Laterality: Right;  Social History    Socioeconomic History      Marital status:     Tobacco Use      Smoking status: Former        Packs/day: 0.00        Years: 2.0 packs/day for 30.0 years (60.0 ttl pk-yrs)        Types: Cigarettes        Start date: 1975        Quit date: 2005        Years since quittin.3      Smokeless tobacco: Never    Substance and Sexual Activity      Alcohol use: No        Comment: started ~, reports 1 shot daily, max 3 shots daily, vague about alcohol consumption. Last drink 2018      Drug use: No    Social Determinants of Health  Financial Resource Strain: Low Risk  (10/13/2023)      Overall Financial Resource Strain (CARDIA)          Difficulty of Paying Living Expenses: Not very hard  Physical Activity: Unknown (10/13/2023)      Exercise Vital Sign          Days of Exercise per Week: 7 days          Minutes of Exercise per Session: Patient declined  Stress: Patient Declined (10/13/2023)      [employer] - Occupational Stress Questionnaire          Feeling of Stress : Patient declined  Review of patient's allergies indicates:  No Known Allergies  Jian Arrieta had no medications administered during this visit.  Review of Systems   Constitutional:  Negative for activity change, appetite change, chills, diaphoresis, fatigue, fever and unexpected weight change.   HENT:  Negative for postnasal drip, rhinorrhea, sinus pressure/congestion, sneezing, sore throat, trouble swallowing and voice change.    Respiratory:  Negative for cough, shortness of breath and wheezing.    Cardiovascular:  Positive for leg swelling. Negative for chest pain and palpitations.   Gastrointestinal:  Negative for abdominal pain, blood  in stool, constipation, diarrhea, nausea and vomiting.   Genitourinary:  Negative for dysuria.   Musculoskeletal:  Negative for arthralgias and myalgias.   Integumentary:  Negative for rash and wound.   Allergic/Immunologic: Negative for environmental allergies and food allergies.   Hematological:  Negative for adenopathy. Does not bruise/bleed easily.          Objective     Physical Exam  Constitutional:       General: He is not in acute distress.     Appearance: Normal appearance. He is well-developed. He is not diaphoretic.   HENT:      Head: Normocephalic and atraumatic.      Right Ear: External ear normal.      Left Ear: External ear normal.      Nose: Nose normal.      Mouth/Throat:      Pharynx: No oropharyngeal exudate.   Eyes:      General: No scleral icterus.        Right eye: No discharge.         Left eye: No discharge.      Conjunctiva/sclera: Conjunctivae normal.      Pupils: Pupils are equal, round, and reactive to light.   Neck:      Vascular: No JVD.   Cardiovascular:      Rate and Rhythm: Normal rate and regular rhythm.      Pulses: Normal pulses.      Heart sounds: Normal heart sounds. No murmur heard.  Pulmonary:      Effort: Pulmonary effort is normal. No respiratory distress.      Breath sounds: Normal breath sounds. No wheezing or rales.   Abdominal:      General: Bowel sounds are normal.      Tenderness: There is no abdominal tenderness. There is no guarding or rebound.   Musculoskeletal:      Cervical back: Normal range of motion and neck supple.      Right lower leg: Edema present.      Left lower leg: Edema present.   Lymphadenopathy:      Cervical: No cervical adenopathy.   Skin:     General: Skin is warm and dry.      Capillary Refill: Capillary refill takes less than 2 seconds.      Coloration: Skin is not pale.      Findings: No rash.   Neurological:      Mental Status: He is alert and oriented to person, place, and time. Mental status is at baseline.      Cranial Nerves: No cranial  nerve deficit.   Psychiatric:         Mood and Affect: Mood normal.         Behavior: Behavior normal.            Assessment and Plan     1. Annual physical exam    2. Diabetic polyneuropathy associated with type 2 diabetes mellitus    3. Obesity hypoventilation syndrome    4. Coronary artery disease due to calcified coronary lesion  Overview:  5 stents on ASA        5. Aortic atherosclerosis    6. Paroxysmal atrial fibrillation  -     TSH; Future; Expected date: 02/23/2024    7. Hyperlipidemia associated with type 2 diabetes mellitus    8. Atherosclerosis of aorta    9. Hx of CABG    10. Stage 4 chronic kidney disease    11. Chronic combined systolic and diastolic congestive heart failure    12. Medically noncompliant    13. Portal hypertension due to obstruction of extrahepatic portal vein    14. Hepatic fibrosis    15. Other chronic pancreatitis    16. 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 Status post bariatric surgery    18. Type 2 diabetes mellitus with diabetic neuropathy, with long-term current use of insulin    19. Type 2 diabetes mellitus with stage 4 chronic kidney disease, with long-term current use of insulin    20. Diabetes mellitus type 1, with complication, on long term insulin pump    21. Venous stasis dermatitis of both lower extremities    22. Venous insufficiency of both lower extremities    23. Anemia, unspecified type    24. Sleep apnea, unspecified type    25. Lymphedema of both lower extremities      Recent blood work reviewed with pt    Obstructive uropathy/right hydronephrosis- 2/2 kidney stone   -s/p removal with ureteral stent placement, followed by Urology     Ascites/portal HTN/Liver fibrosis- significant improvement s/p venoplasty for portal vein occlusion        Followed by Hepatology       T2DM with neuropathy/CKD- last A1C of 6.7(1/24)<--7.6(10/23)<--8.1(3/22)<--7.1(5/19)<--7.4(3/19)<--6.9(1/19)       -followed by Endo      CHF/PAF- stable   -pt  declining anticoagulation, was advised of the risk      CAD with hx of CABG- stable, CPT      CKD IV- followed by nephrology      HTN- currently elevated off medication      HLD- stable      Morbid Obesity- pt advised on proper diet/exercise for weight loss      Chronic LE lymphedema with stasis dermatitis- stable      NC/NC Anemia- stable      BERHANE- pt not using his CPAP      Aortic atherosclerosis- stable      Chronic pancreatitis- stable      Hyperparathyroidism- stable      Medical noncompliance       F/u in 6 months

## 2024-03-01 ENCOUNTER — TELEPHONE (OUTPATIENT)
Dept: INTERNAL MEDICINE | Facility: CLINIC | Age: 69
End: 2024-03-01
Payer: MEDICARE

## 2024-03-01 DIAGNOSIS — N18.4 TYPE 2 DIABETES MELLITUS WITH STAGE 4 CHRONIC KIDNEY DISEASE, WITH LONG-TERM CURRENT USE OF INSULIN: ICD-10-CM

## 2024-03-01 DIAGNOSIS — E78.5 HYPERLIPIDEMIA ASSOCIATED WITH TYPE 2 DIABETES MELLITUS: Primary | ICD-10-CM

## 2024-03-01 DIAGNOSIS — Z79.4 TYPE 2 DIABETES MELLITUS WITH STAGE 4 CHRONIC KIDNEY DISEASE, WITH LONG-TERM CURRENT USE OF INSULIN: ICD-10-CM

## 2024-03-01 DIAGNOSIS — E11.22 TYPE 2 DIABETES MELLITUS WITH STAGE 4 CHRONIC KIDNEY DISEASE, WITH LONG-TERM CURRENT USE OF INSULIN: ICD-10-CM

## 2024-03-01 DIAGNOSIS — E11.69 HYPERLIPIDEMIA ASSOCIATED WITH TYPE 2 DIABETES MELLITUS: Primary | ICD-10-CM

## 2024-03-04 ENCOUNTER — LAB VISIT (OUTPATIENT)
Dept: LAB | Facility: HOSPITAL | Age: 69
End: 2024-03-04
Attending: INTERNAL MEDICINE
Payer: MEDICARE

## 2024-03-04 DIAGNOSIS — N18.4 CHRONIC KIDNEY DISEASE, STAGE 4 (SEVERE): ICD-10-CM

## 2024-03-04 LAB
BACTERIA #/AREA URNS AUTO: ABNORMAL /HPF
BILIRUB UR QL STRIP: NEGATIVE
CLARITY UR REFRACT.AUTO: CLEAR
COLOR UR AUTO: YELLOW
CREAT UR-MCNC: 101 MG/DL (ref 23–375)
GLUCOSE UR QL STRIP: ABNORMAL
HGB UR QL STRIP: ABNORMAL
HYALINE CASTS UR QL AUTO: 3 /LPF
KETONES UR QL STRIP: NEGATIVE
LEUKOCYTE ESTERASE UR QL STRIP: NEGATIVE
MICROSCOPIC COMMENT: ABNORMAL
NITRITE UR QL STRIP: NEGATIVE
PH UR STRIP: 6 [PH] (ref 5–8)
PROT UR QL STRIP: ABNORMAL
PROT UR-MCNC: 599 MG/DL (ref 0–15)
PROT/CREAT UR: 5.93 MG/G{CREAT} (ref 0–0.2)
RBC #/AREA URNS AUTO: 5 /HPF (ref 0–4)
SP GR UR STRIP: 1.02 (ref 1–1.03)
SQUAMOUS #/AREA URNS AUTO: 0 /HPF
URN SPEC COLLECT METH UR: ABNORMAL
WBC #/AREA URNS AUTO: 4 /HPF (ref 0–5)
YEAST UR QL AUTO: ABNORMAL

## 2024-03-04 PROCEDURE — 81001 URINALYSIS AUTO W/SCOPE: CPT | Performed by: INTERNAL MEDICINE

## 2024-03-04 PROCEDURE — 82570 ASSAY OF URINE CREATININE: CPT | Performed by: INTERNAL MEDICINE

## 2024-03-06 ENCOUNTER — PATIENT MESSAGE (OUTPATIENT)
Dept: NEPHROLOGY | Facility: CLINIC | Age: 69
End: 2024-03-06
Payer: MEDICARE

## 2024-03-06 DIAGNOSIS — E87.5 HYPERKALEMIA: Primary | ICD-10-CM

## 2024-03-07 ENCOUNTER — HOSPITAL ENCOUNTER (OUTPATIENT)
Dept: RADIOLOGY | Facility: HOSPITAL | Age: 69
Discharge: HOME | End: 2024-03-07
Payer: MEDICARE

## 2024-03-07 DIAGNOSIS — N20.1 URETERAL STONE: ICD-10-CM

## 2024-03-07 PROCEDURE — 74176 CT ABD & PELVIS W/O CONTRAST: CPT | Mod: 26,,, | Performed by: RADIOLOGY

## 2024-03-07 PROCEDURE — 74018 RADEX ABDOMEN 1 VIEW: CPT | Mod: 26,,, | Performed by: RADIOLOGY

## 2024-03-07 PROCEDURE — 74018 RADEX ABDOMEN 1 VIEW: CPT | Mod: TC

## 2024-03-07 PROCEDURE — 74176 CT ABD & PELVIS W/O CONTRAST: CPT | Mod: TC

## 2024-03-08 ENCOUNTER — OFFICE VISIT (OUTPATIENT)
Dept: UROLOGY | Facility: CLINIC | Age: 69
End: 2024-03-08
Payer: MEDICARE

## 2024-03-08 VITALS
DIASTOLIC BLOOD PRESSURE: 79 MMHG | BODY MASS INDEX: 47.06 KG/M2 | SYSTOLIC BLOOD PRESSURE: 145 MMHG | HEIGHT: 67 IN | WEIGHT: 299.81 LBS | HEART RATE: 88 BPM

## 2024-03-08 DIAGNOSIS — N22 CALCULUS OF URINARY TRACT IN DISEASES CLASSIFIED ELSEWHERE: ICD-10-CM

## 2024-03-08 DIAGNOSIS — N20.0 BILATERAL KIDNEY STONES: Primary | ICD-10-CM

## 2024-03-08 PROCEDURE — 3062F POS MACROALBUMINURIA REV: CPT | Mod: CPTII,S$GLB,, | Performed by: UROLOGY

## 2024-03-08 PROCEDURE — 3066F NEPHROPATHY DOC TX: CPT | Mod: CPTII,S$GLB,, | Performed by: UROLOGY

## 2024-03-08 PROCEDURE — 99999 PR PBB SHADOW E&M-EST. PATIENT-LVL III: CPT | Mod: PBBFAC,,, | Performed by: UROLOGY

## 2024-03-08 PROCEDURE — 3288F FALL RISK ASSESSMENT DOCD: CPT | Mod: CPTII,S$GLB,, | Performed by: UROLOGY

## 2024-03-08 PROCEDURE — 1101F PT FALLS ASSESS-DOCD LE1/YR: CPT | Mod: CPTII,S$GLB,, | Performed by: UROLOGY

## 2024-03-08 PROCEDURE — 3044F HG A1C LEVEL LT 7.0%: CPT | Mod: CPTII,S$GLB,, | Performed by: UROLOGY

## 2024-03-08 PROCEDURE — 3077F SYST BP >= 140 MM HG: CPT | Mod: CPTII,S$GLB,, | Performed by: UROLOGY

## 2024-03-08 PROCEDURE — 1159F MED LIST DOCD IN RCRD: CPT | Mod: CPTII,S$GLB,, | Performed by: UROLOGY

## 2024-03-08 PROCEDURE — 3008F BODY MASS INDEX DOCD: CPT | Mod: CPTII,S$GLB,, | Performed by: UROLOGY

## 2024-03-08 PROCEDURE — 3078F DIAST BP <80 MM HG: CPT | Mod: CPTII,S$GLB,, | Performed by: UROLOGY

## 2024-03-08 PROCEDURE — 99214 OFFICE O/P EST MOD 30 MIN: CPT | Mod: S$GLB,,, | Performed by: UROLOGY

## 2024-03-08 NOTE — PROGRESS NOTES
HPI  This 68 y.o. y/o male with PMH of type 2 DM, HTN, HLD, hx of HFrEF&HFpEF (most recent echo showed normal EF and no diastolic dysfunction in 2023), portal vein thrombosis with mild cirrhosis & chronic pancreatitis c/b ascites s/p venoplasty, CAD s/p PCI x5 on Plavix with subsequent CABG, right proximal ureteral stone s/p right ureteroscopy with laser lithotripsy, cystoscopy, right JJ ureteral stent exchange in 2021, now removed, right hydronephrosis s/p ureteral stent, removed Dec 6/2023.    Intermittent chest pain likely 2/2 uncontrolled HTN. Recommended to see his Cardiology, but he states he did not go.    Renal history  Creatinine   Date Value Ref Range Status   03/04/2024 2.8 (H) 0.5 - 1.4 mg/dL Final   03/04/2024 2.8 (H) 0.5 - 1.4 mg/dL Final   02/12/2024 3.5 (H) 0.5 - 1.4 mg/dL Final   01/18/2024 2.5 (H) 0.5 - 1.4 mg/dL Final   12/28/2023 2.7 (H) 0.5 - 1.4 mg/dL Final   11/29/2023 3.0 (H) 0.5 - 1.4 mg/dL Final   11/08/2023 2.8 (H) 0.5 - 1.4 mg/dL Final   10/30/2023 2.6 (H) 0.5 - 1.4 mg/dL Final   10/17/2023 3.1 (H) 0.5 - 1.4 mg/dL Final   10/16/2023 3.5 (H) 0.5 - 1.4 mg/dL Final   Cr trended up tp 3.5, likely 2/2 uncontrolled HTN (not taking his meds), then decrease to 2.8  Cr trended up to 2.4 then 4.7->hospitalized in Oct 2023, s/p treatment of DKA (insulin + fluid) and right ureteral stent via ureteroscopy (CT Mild right hydronephrosis and right hydroureter with gradual tapering without evidence for ureteral calculus) 10/2023-> Cr trended down to 3.1; epididymitis s/p abx  Cr 1.8-2.2 2020-early 2023  Cr up to 3.5 in 2020, then down to 1.8-2.0 in 03/2020, RUQ pain, s/p Cholecystostomy tube placement   Cr up to 4.9 in 2019, then down to 1.4 02/2020; hospitalization for large amount ascites removal s/p venoplasty   Cr 1.1-1.3 prior to 2019  Prot/Creat Ratio, Urine   Date Value Ref Range Status   03/04/2024 5.93 (H) 0.00 - 0.20 Final   02/12/2024 5.37 (H) 0.00 - 0.20 Final   Urine protein 2+ started  2019  Hep B/C, SPEP, SFLC neg; CAROLYNN, ANCA, C3, C4 neg, HIV, RPR neg. Mild elevated IgG noted.  Right proximal ureteral stone s/p right ureteroscopy with laser lithotripsy, cystoscopy, right JJ ureteral stent exchange in 2021, now removed, right hydronephrosis s/p ureteral stent, removed Dec 6/2023; Follows with Dr. Diaz  Has been drinking 50 oz of fluid a day  Not taking NSAIDs    Renal stone  Multiple episodes+ > 20  Right proximal ureteral stone s/p right ureteroscopy with laser lithotripsy, cystoscopy, right JJ ureteral stent exchange in 2021. Stone analysis: 80% Uric acid, 20% Calcium oxalate monohydrate.  S/p uroscopy on 12/6 : Stones were fragmented into dust on basket extraction; Retrograde pyelogram showed delicate calices with no evidence of hydronephrosis  Follows with Dr. Diaz    HTN  161/93 mmHg  Current medication on the list: amlodipine 10 mg, hydralazine 25 mg TID, and torsemide 20 mg daily; however, the patient has only been taking hydralazine  Trying to be compliant with low salt diet  Persistent legs swollen    Type 2 DM  Lab Results   Component Value Date    HGBA1C 6.7 (H) 01/18/2024   No DM retinopathy (correct from prior chart)  Has been poorly controlled (A1c 11 1 year ago)  With insulin therapy    10/2023 abdominal scan  Nonobstructing calcification at the midpole of the left kidney is again noted.  There is mild perinephric stranding bilaterally, this is mildly more prominent than the prior study and is nonspecific, correlation for renal disease to include UTI/pyelonephritis is needed.  On the left there is no evidence for ureteral calculus or obstructive uropathy.  On the right there is mild right hydronephrosis and mild right hydroureter with tapering of the right ureter, there is no ureteral calculus seen.  These findings may relate to sequelae of recently passed calculus, clinical and historical correlation is needed.  Mild urinary bladder wall thickening may relate to incomplete distention  however correlation for UTI/cystitis is needed.    Impression:     Mild right hydronephrosis and right hydroureter with gradual tapering without evidence for ureteral calculus, these findings may relate to sequelae of recently passed calculus, clinical and historical correlation is needed.     Mild perinephric stranding bilaterally, nonspecific however correlation for UTI/pyelonephritis is needed.     Mild urinary bladder wall thickening may relate to incomplete distention however correlation for UTI/cystitis is needed.    RP US 11/2023  FINDINGS:  Images are degraded secondary to patient positioning and adjacent bowel gas.     Right kidney: The right kidney measures 8.8 cm. No cortical thinning. There is loss of corticomedullary distinction. Resistive index measures 0.83.  No mass. No renal stone. No hydronephrosis.     Left kidney: The left kidney measures 9.9 cm. No cortical thinning. There is loss of corticomedullary distinction. Resistive index measures 0.76.  Junctional defect present which is a normal variant.  No mass. 1.0 cm nonobstructing stone in the midpole.  No hydronephrosis.     The bladder is partially distended at the time of scanning and has an unremarkable appearance.     Hyperechoic area within the liver consistent with artifact seen on CT from 10/12/2023.     Impression:     No hydronephrosis visualized noting images are degraded secondary to patient positioning and adjacent bowel gas.     Findings suggestive of medical renal disease.        Past Medical History:   Diagnosis Date    Alcohol abuse     Anasarca 1/28/2019    Anemia     Anticoagulant long-term use     Arthropathy associated with neurological disorder 9/2/2015    Atherosclerosis     Charcot foot due to diabetes mellitus     Chronic combined systolic and diastolic heart failure 01/29/2019 1-28-19 Left VentricleModerate decreased ejection fraction at 30%. Normal left ventricular cavity size. Normal wall thickness observed. Grade I  (mild) left ventricular diastolic dysfunction consistent with impaired relaxation. Normal left atrial pressure. Moderate, global hypokinesis(see wall scoring diagram). Right VentricleNormal cavity size, wall thickness and ejection fraction. Wall motion n    Chronic pancreatitis 1/28/2019    CKD (chronic kidney disease) stage 3, GFR 30-59 ml/min     CKD (chronic kidney disease) stage 4, GFR 15-29 ml/min     Colon polyps     approx 5 yrs ago    Coronary artery disease due to calcified coronary lesion 05/08/2015    5 stents on ASA      Diabetic polyneuropathy associated with type 2 diabetes mellitus 9/2/2015    Diverticulosis 1/28/2019    DM type 2 with diabetic peripheral neuropathy 2/4/2019    Encounter for blood transfusion     Essential hypertension 1/28/2019    Former smoker 8/26/2015    Healed ulcer of left foot on examination 6/20/2017    Hematuria     Hydrocele     approx 1.5 yrs ago    Hyperphosphatemia     Hypoalbuminemia 2/4/2019    Hypocalcemia     Lymphedema of both lower extremities 1/29/2019    Mixed hyperlipidemia 5/8/2015    Morbid obesity with BMI of 50.0-59.9, adult 5/8/2015    Obstruction of right ureteropelvic junction (UPJ) due to stone 5/24/2021    Onychomycosis of multiple toenails with type 2 diabetes mellitus and peripheral neuropathy 6/20/2017    Perianal cyst     approx 2 yrs ago    Proteinuria     Pseudocyst of pancreas 1/28/2019 1-28-19 Liver has a cirrhotic morphology with no focal lesions.  Significant interval increase in ascites when compared to prior exam which may account for patient's abdominal distension.  Hypodense air-fluid collection along the body of the pancreas which is slightly smaller when compared to prior CT.  Findings may relate to pancreatic necrosis with pancreatic pseudocysts with infected pseudocyst    Skin cancer     skin cancer    Sleep apnea 8/26/2015    Status post bariatric surgery 1/11/2016    Type 2 diabetes mellitus, with long-term current use of insulin  5/8/2015    Urinary tract infection        Past Surgical History:   Procedure Laterality Date    ANGIOGRAPHY N/A 6/28/2019    Procedure: ANGIOGRAM-PV STENT;  Surgeon: E[facility] Diagnostic Provider;  Location: [facility] OR;  Service: Radiology;  Laterality: N/A;    ANGIOPLASTY      total x5 stents    COLONOSCOPY N/A 10/6/2015    Procedure: COLONOSCOPY;  Surgeon: Shekhar Richards MD;  Location: [facility] (2ND FLR);  Service: Endoscopy;  Laterality: N/A;  BMI over 55/2nd floor case    5 day hold Plavix, Dr Kwadwo Arroyo    COLONOSCOPY N/A 5/13/2021    Procedure: COLONOSCOPY;  Surgeon: Huan Brumfield MD;  Location: [facility] ENDO;  Service: Endoscopy;  Laterality: N/A;    CORONARY ANGIOGRAPHY Right 3/20/2019    Procedure: ANGIOGRAM, CORONARY ARTERY;  Surgeon: Bob Duque MD;  Location: [facility] CATH LAB;  Service: Cardiology;  Laterality: Right;    CORONARY ARTERY BYPASS GRAFT  2017    x3    CORONARY BYPASS GRAFT ANGIOGRAPHY  3/20/2019    Procedure: Bypass graft study;  Surgeon: Bob Duque MD;  Location: [facility] CATH LAB;  Service: Cardiology;;    CYST REMOVAL      CYSTOSCOPY Right 6/30/2021    Procedure: CYSTOSCOPY;  Surgeon: William Diaz MD;  Location: [facility] OR 1ST FLR;  Service: Urology;  Laterality: Right;    CYSTOSCOPY N/A 10/16/2023    Procedure: CYSTOSCOPY;  Surgeon: Chrystal Dias MD;  Location: [facility] OR 1ST FLR;  Service: Urology;  Laterality: N/A;    CYSTOSCOPY N/A 12/6/2023    Procedure: CYSTOSCOPY;  Surgeon: William Diaz MD;  Location: [facility] OR 1ST FLR;  Service: Urology;  Laterality: N/A;    CYSTOSCOPY W/ URETERAL STENT PLACEMENT Right 6/16/2021    Procedure: CYSTOSCOPY, WITH URETERAL STENT INSERTION;  Surgeon: William Diaz MD;  Location: [facility] OR 2ND FLR;  Service: Urology;  Laterality: Right;  FLUORO LESS THAN 1 HOUR    ENDOSCOPIC ULTRASOUND OF UPPER GASTROINTESTINAL TRACT N/A 2/26/2020    Procedure: ULTRASOUND, UPPER GI TRACT, ENDOSCOPIC;  Surgeon: Robbie Yang MD;  Location: [facility];  Service:  Endoscopy;  Laterality: N/A;    ESOPHAGOGASTRODUODENOSCOPY N/A 7/8/2019    Procedure: ESOPHAGOGASTRODUODENOSCOPY (EGD);  Surgeon: Huan Brumfield MD;  Location: [facility] ENDO;  Service: Endoscopy;  Laterality: N/A;    ESOPHAGOGASTRODUODENOSCOPY N/A 5/13/2021    Procedure: EGD (ESOPHAGOGASTRODUODENOSCOPY);  Surgeon: Huan Brumfield MD;  Location: [facility] ENDO;  Service: Endoscopy;  Laterality: N/A;    EXCISION OF HYDROCELE Bilateral 6/16/2021    Procedure: HYDROCELE REPAIR;  Surgeon: William Diaz MD;  Location: [facility] OR 2ND FLR;  Service: Urology;  Laterality: Bilateral;  2 HOURS    EXTRACTION - STONE Right 12/6/2023    Procedure: EXTRACTION - STONE;  Surgeon: William Diaz MD;  Location: [facility] OR 1ST FLR;  Service: Urology;  Laterality: Right;    FLUOROSCOPY N/A 6/16/2021    Procedure: FLUOROSCOPY;  Surgeon: William Diaz MD;  Location: [facility] OR 2ND FLR;  Service: Urology;  Laterality: N/A;    GASTRECTOMY      INSERTION OF DIALYSIS CATHETER N/A 5/17/2019    Procedure: pleurx;  Surgeon: [facility] Diagnostic Provider;  Location: [facility] OR 2ND FLR;  Service: General;  Laterality: N/A;  Room 188/[facility]    KNEE ARTHROSCOPY      LAPAROSCOPIC CHOLECYSTECTOMY N/A 5/4/2020    Procedure: CHOLECYSTECTOMY, LAPAROSCOPIC;  Surgeon: SON Rowe MD;  Location: [facility] OR;  Service: General;  Laterality: N/A;    LASER LITHOTRIPSY N/A 6/30/2021    Procedure: LITHOTRIPSY, USING LASER;  Surgeon: William Diaz MD;  Location: [facility] OR 1ST FLR;  Service: Urology;  Laterality: N/A;    LIVER BIOPSY N/A 5/4/2020    Procedure: BIOPSY, LIVER, Laproscopic ;  Surgeon: SON Rowe MD;  Location: [facility] OR;  Service: General;  Laterality: N/A;    perianal surgery      perianal cyst removed    PYELOSCOPY Right 6/30/2021    Procedure: PYELOSCOPY;  Surgeon: William Diaz MD;  Location: [facility] OR 1ST FLR;  Service: Urology;  Laterality: Right;    PYELOSCOPY Right 10/16/2023    Procedure: PYELOSCOPY;  Surgeon: Chrystal Dias MD;  Location:  [facility] OR 1ST FLR;  Service: Urology;  Laterality: Right;    PYELOSCOPY Right 12/6/2023    Procedure: PYELOSCOPY;  Surgeon: William Diaz MD;  Location: [facility] OR 1ST FLR;  Service: Urology;  Laterality: Right;    REMOVAL-STENT Right 12/6/2023    Procedure: REMOVAL-STENT;  Surgeon: William Diaz MD;  Location: [facility] OR 1ST FLR;  Service: Urology;  Laterality: Right;    REPLACEMENT OF STENT Right 6/30/2021    Procedure: REPLACEMENT, STENT;  Surgeon: William Diaz MD;  Location: [facility] OR 1ST FLR;  Service: Urology;  Laterality: Right;    RETROGRADE PYELOGRAPHY Right 10/16/2023    Procedure: PYELOGRAM, RETROGRADE;  Surgeon: Chrystal Dias MD;  Location: [facility] OR 1ST FLR;  Service: Urology;  Laterality: Right;    RETROGRADE PYELOGRAPHY Right 12/6/2023    Procedure: PYELOGRAM, RETROGRADE;  Surgeon: William Diaz MD;  [facility];  Service: Urology;  Laterality: Right;    URETERAL STENT PLACEMENT Right 10/16/2023    Procedure: INSERTION, STENT, URETER;  Surgeon: Chrystal Dias MD;  [facility];  Service: Urology;  Laterality: Right;    URETEROSCOPY Right 6/30/2021    Procedure: URETEROSCOPY FLEXIBLE URETEROSCOPE;  Surgeon: William Diaz MD;  [facility];  Service: Urology;  Laterality: Right;    URETEROSCOPY Right 10/16/2023    Procedure: URETEROSCOPY;  Surgeon: Chrystal Dias MD;  Location: [facility] OR [facility] FLR;  Service: Urology;  Laterality: Right;    URETEROSCOPY Right 12/6/2023    Procedure: URETEROSCOPY;  Surgeon: William Diaz MD;  [facility];  Service: Urology;  Laterality: Right;       Review of patient's allergies indicates:  No Known Allergies      Social History     Socioeconomic History    Marital status:    Tobacco Use    Smoking status: Former     Current packs/day: 0.00     Average packs/day: 2.0 packs/day for 30.0 years (60.0 ttl pk-yrs)     Types: Cigarettes     Start date: 2/1/1975     Quit date: 2/1/2005     Years  since quittin.1    Smokeless tobacco: Never   Substance and Sexual Activity    Alcohol use: No     Comment: started ~, reports 1 shot daily, max 3 shots daily, vague about alcohol consumption. Last drink 2018    Drug use: No     Social Determinants of Health     Financial Resource Strain: Low Risk  (10/13/2023)    Overall Financial Resource Strain (CARDIA)     Difficulty of Paying Living Expenses: Not very hard   Physical Activity: Unknown (10/13/2023)    Exercise Vital Sign     Days of Exercise per Week: 7 days     Minutes of Exercise per Session: Patient declined   Stress: Patient Declined (10/13/2023)    [employer] [place] [employer] - Occupational Stress Questionnaire     Feeling of Stress : Patient declined   Social Connections: Moderately Isolated (10/13/2023)    Social Connection and Isolation Panel [NHANES]     Frequency of Communication with Friends and Family: More than three times a week     Frequency of Social Gatherings with Friends and Family: More than three times a week     Attends Muslim Services: Never     Active Member of Clubs or Organizations: No     Attends Club or Organization Meetings: Never     Marital Status:        Family History   Problem Relation Age of Onset    Cancer Mother     Cancer Father     Heart disease Father     Obesity Sister     Parkinsonism Brother     No Known Problems Paternal Grandmother     Cancer Paternal Grandfather     Cancer Brother     Diabetes Maternal Grandmother     Stroke Maternal Grandfather     Cirrhosis Neg Hx        ROS negative except listed above    PHYSICAL EXAMINATION:  Blood pressure (!) 161/93, pulse 85, weight (!) 137 kg (302 lb 0.5 oz), SpO2 99 %.  Constitutional - No acute distress  HEENT - Grossly normal  Neck - supple.   Cardiovascular - JVP mild distended around 8 cm  Respiratory - Distant breathign sound due to body habitus  Musculoskeletal - Peripheral edema 2+ (chronic edema for years, but can be superimposed by  proteinuria)  Dermatologic/Skin - Skin warm and dry.  No rashes.    Neurologic - No acute neurological deficit  Psychiatric - AAOx3    Assessment and Plan  CKD stage 4 with rapid progression 2/2 uncontrolled HTN (due to medication noncompliance)       Proteinuria    Urine Protein Creatinine Ratios:    Prot/Creat Ratio, Urine   Date Value Ref Range Status   03/04/2024 5.93 (H) 0.00 - 0.20 Final   02/12/2024 5.37 (H) 0.00 - 0.20 Final   12/28/2023 7.83 (H) 0.00 - 0.20 Final         Acid-Base:   Lab Results   Component Value Date     03/04/2024     03/04/2024    K 5.4 (H) 03/04/2024    K 5.5 (H) 03/04/2024    CO2 21 (L) 03/04/2024    CO2 21 (L) 03/04/2024     -Etiology of CKD: multiple ELIEZER in the past (most recent one 2/2 DKA, obstructive uropathy), HTN, DM, hx of CHF  -Hypoalbuminemia is likely chronic due to underlying pancreatitis/PVT/cirrhosis rather than proteinuria. Hep B/C, SPEP, SFLC neg; CAROLYNN, ANCA, C3, C4 neg, HIV, RPR neg. Mild elevated IgG noted.  -Counseled to avoid NSAIDs  -Adequate fluid intake 45-50 oz a day  -Continue to hold ACEI/ARB given hyperK   -Discussed with the patient regarding SGLT2 as it can reduce the risk of hyperkalemia in type 2 DM (Circulation. 2022;[id number]) but it can increase the risk of infection and DKA; however, will not add this now due to the medication noncompliance (ask him to try taking all his anti-HTN meds prescribed).  -Continue baking soda one tea spoon two times a day  -Continues to follow with Urology for right hydroureter/hydronephrosis s/p stent  -Counseled for avoiding high K food  -Counseled the patient extensively regarding the importance of HTN, DM and CHF control; without taking medications for these, he will be needing RRT in the near future  -Pt decline CKD education. Briefly go over HD/PD/conservative care options    2. HTN: BP goal 130/80 mmHg  -Counseled for low salt diet  -Counseled the patient to check BP at home  -Consider Amlodipine 10  mg, hydralazine 25 mg TID, torsemide 20 mg daily  -Counseled for medication compliance    3. Nephrolithiasis  Continue to hold potassium citrate 10 meq TID due to recent hyperK and elevated Cr.  Continue to follow up with Dr. Diaz  Will continue to monitor    4. Secondary hyperparathyroidism:   Lab Results   Component Value Date    .5 (H) 03/04/2024    .1 (H) 11/08/2023    CALCIUM 8.2 (L) 03/04/2024    CALCIUM 8.3 (L) 03/04/2024    CAION 1.13 03/15/2019    PHOS 3.7 03/04/2024    PHOS 4.2 02/12/2024    PHOS 4.2 02/12/2024     Vit D, 25-Hydroxy   Date Value Ref Range Status   02/12/2024 14 (L) 30 - 96 ng/mL Final     Comment:     Vitamin D deficiency.........<10 ng/mL                              Vitamin D insufficiency......10-29 ng/mL       Vitamin D sufficiency........> or equal to 30 ng/mL  Vitamin D toxicity............>100 ng/mL     Albumin 2.8, corrected calcium 9.3 WNL  PTH 2x upper limit at the lower side for CKD patient  On Vit D 50,000 units weekly x12     5. Anemia:   Lab Results   Component Value Date    HGB 12.6 (L) 03/04/2024    FERRITIN 124 11/08/2023    IRON 120 11/08/2023    TRANSFERRIN 205 11/08/2023    TIBC 303 11/08/2023    FESATURATED 40 11/08/2023   Will continue to monitor    6. Type 2 DM:  Last HbA1C   Lab Results   Component Value Date    HGBA1C 6.7 (H) 01/18/2024     Counseled the patient regarding the importance of sugar control to slow CKD progression     7. HFrEF & HFpEF  Counseled the importance of fluid control/taking torsemide. Adequate CHF control can slow the progression of CKD.    Follow up in 4 weeks

## 2024-03-08 NOTE — PROGRESS NOTES
"      CHIEF COMPLAINT:    Mr. Arrieta is a 68 y.o. male presenting to discuss his upcoming surgery.  Hospitalization discharge follow up.  PRESENTING ILLNESS:    Jian Arrieta is a 68 y.o. male who presents for evaluation of right flank pain.    PMHx MARIE (liver bx 3/2019 w/o cirrhosis), hx of sleeve gastrectomy, CAD s/p CABG, and CKD3 w/ hx of obstructive nephrolithiasis who presents to the ED from nephrology clinic for worsening renal function for last 2 weeks. Pt. Baseline Cr ~2.2, but he recently presented to [facility] ED with B/L flank pain. Cr wasmildly elevated at 2.56 and CT renal stone revealed "mild fullness of the proximal R renal collecting system without evidence of obstructing stone and surrounding perinephric stranding." Pt. Was discharged from the ED with PO doxycycline to treat possible pyelonephritis although U/A was LE negative and not sent for culture. he obtained nephrology f/u yesterday, and on repeat labs Cr noted to be 4.7 and K+ 5.2., so he was referred to the ED. He reports some persistent flank pain, but it has improved. He denies any fevers, chills, nausea, or vomiting. No reported changes in urine output, dysuria, or hematuria. On ED arrival, pt. Noted to have  and pt. Reports that his omnipod Dash - Insulin pump must be malfunctioning, although he is not sure when it stopped working. Labs 3 days ago show normal BG.  10/13- admitted for uncontrollled diabetes, acute renal failure, obstructive nephropathy. Recently treated for pyelo w doxycycline. /79   Pulse 97  . Cr 4.9-> 4.5 BL 2.1.  -> 340. Received NS. UO- 300 cc.  One BM this am.  Nurse reports leg hot and red, photos and put in file - exam consistent w stasis dermatitis. AF. Started empiric clindamycin, this am, and discontinued.  continue NS x one liter.  Urology to place stent in am.   Cxr - mild pulm edema  CT abd - Mild right hydronephrosis and right hydroureter with gradual tapering without evidence for ureteral " calculus, these findings may relate to sequelae of recently passed calculus, clinical and historical correlation is needed.  Mild perinephric stranding bilaterally, nonspecific however correlation for UTI/pyelonephritis is needed.  Mild urinary bladder wall thickening may relate to incomplete distention however correlation for UTI/cystitis is needed.  Mild circumferential thickening of the distal descending colon/proximal sigmoid colon with mild pericolonic haziness, correlation for mild colitis to include mild diverticulitis is needed.  Right adrenal nodule likely representing adrenal adenoma.  10/14- appreciate Urology f/u - no need for stent because renal function improving. Renal US showing mild right hydronephrosis. Cr 4.5-> 3.7  baseline around 2.2. Continuing IVFs.  follow up with Urology- Dr. Diaz outpatient for MAG3. Resume diabetic diet. Endocrine following.  BS 92 this am.     10/15- Cr 3.8 from 3.9 from 4.5, baseline around 2.2.  NPO at midnight for possible stent tomorrow. Appreciate Urology f/u.   10/16-  lab cr 3.5.  NPO.   10/17- stent placed successfully yesterday. Eating. Cr 3.1 continues to improve. Will dc to home today. F/u Urology and int med.      Because of right hydronephrosis and ELIEZER. Pt underwent urgent urologic surgery on 10/16/23.  Procedure: 10/16/23  1.  Cystoscopy with right retrograde pyelogram  2.  Fluoroscopy < 1 hour  3.  Intra-operative interpretation of radiographic images  4.  Right ureteroscopy  5.  Right pyeloscopy   6.  Right ureteral stent placement  Operative Findings:   Right retrograde pyelogram with questionable filling defect at right UPJ  Right ureteroscopy with radiolucent stone identified at right UPJ  Right pyeloscopy revealed turbid urine and additional radiolucent stone burden in the right kidney  Right ureteral stent placed with good coils on fluoroscopy and direct vision in the bladder    Stone Analysis  80% Uric acid   20% Calcium oxalate monohydrate     He had  bilateral hydrocelectomy on 6/16/21.  He saw Dr. Diaz on 6/21/21 and was noted to have a post op hematoma.  He has not started urocit k due to decreased kidney function.  Tried viagra in the past and it worked well for him.    PATIENT HISTORY:    Past Medical History:   Diagnosis Date    Alcohol abuse     Anasarca 1/28/2019    Anemia     Anticoagulant long-term use     Arthropathy associated with neurological disorder 9/2/2015    Atherosclerosis     Charcot foot due to diabetes mellitus     Chronic combined systolic and diastolic heart failure 01/29/2019 1-28-19 Left VentricleModerate decreased ejection fraction at 30%. Normal left ventricular cavity size. Normal wall thickness observed. Grade I (mild) left ventricular diastolic dysfunction consistent with impaired relaxation. Normal left atrial pressure. Moderate, global hypokinesis(see wall scoring diagram). Right VentricleNormal cavity size, wall thickness and ejection fraction. Wall motion n    Chronic pancreatitis 1/28/2019    CKD (chronic kidney disease) stage 3, GFR 30-59 ml/min     CKD (chronic kidney disease) stage 4, GFR 15-29 ml/min     Colon polyps     approx 5 yrs ago    Coronary artery disease due to calcified coronary lesion 05/08/2015    5 stents on ASA      Diabetic polyneuropathy associated with type 2 diabetes mellitus 9/2/2015    Diverticulosis 1/28/2019    DM type 2 with diabetic peripheral neuropathy 2/4/2019    Encounter for blood transfusion     Essential hypertension 1/28/2019    Former smoker 8/26/2015    Healed ulcer of left foot on examination 6/20/2017    Hematuria     Hydrocele     approx 1.5 yrs ago    Hyperphosphatemia     Hypoalbuminemia 2/4/2019    Hypocalcemia     Lymphedema of both lower extremities 1/29/2019    Mixed hyperlipidemia 5/8/2015    Morbid obesity with BMI of 50.0-59.9, adult 5/8/2015    Obstruction of right ureteropelvic junction (UPJ) due to stone 5/24/2021    Onychomycosis of multiple toenails with type 2 diabetes  mellitus and peripheral neuropathy 6/20/2017    Perianal cyst     approx 2 yrs ago    Proteinuria     Pseudocyst of pancreas 1/28/2019 1-28-19 Liver has a cirrhotic morphology with no focal lesions.  Significant interval increase in ascites when compared to prior exam which may account for patient's abdominal distension.  Hypodense air-fluid collection along the body of the pancreas which is slightly smaller when compared to prior CT.  Findings may relate to pancreatic necrosis with pancreatic pseudocysts with infected pseudocyst    Skin cancer     skin cancer    Sleep apnea 8/26/2015    Status post bariatric surgery 1/11/2016    Type 2 diabetes mellitus, with long-term current use of insulin 5/8/2015    Urinary tract infection        Past Surgical History:   Procedure Laterality Date    ANGIOGRAPHY N/A 6/28/2019    Procedure: ANGIOGRAM-PV STENT;  Surgeon: [facility] Diagnostic Provider;  Location: [facility] OR;  Service: Radiology;  Laterality: N/A;    ANGIOPLASTY      total x5 stents    COLONOSCOPY N/A 10/6/2015    Procedure: COLONOSCOPY;  Surgeon: Shekhar Richards MD;  [place] [facility] (2ND FLR);  Service: Endoscopy;  Laterality: N/A;  BMI over 55/2nd floor case    5 day hold Plavix, Dr Kwadwo Arroyo    COLONOSCOPY N/A 5/13/2021    Procedure: COLONOSCOPY;  Surgeon: Huan Brumfield MD;  Location: [facility] ENDO;  Service: Endoscopy;  Laterality: N/A;    CORONARY ANGIOGRAPHY Right 3/20/2019    Procedure: ANGIOGRAM, CORONARY ARTERY;  Surgeon: Bob Duque MD;  Location: [facility];  Service: Cardiology;  Laterality: Right;    CORONARY ARTERY BYPASS GRAFT  2017    x3    CORONARY BYPASS GRAFT ANGIOGRAPHY  3/20/2019    Procedure: Bypass graft study;  Surgeon: Bob Duque MD;  [facility];  Service: Cardiology;;    CYST REMOVAL      CYSTOSCOPY Right 6/30/2021    Procedure: CYSTOSCOPY;  Surgeon: William Diaz MD;  Location: [facility] OR 1ST FLR;  Service: Urology;  Laterality: Right;    CYSTOSCOPY N/A  10/16/2023    Procedure: CYSTOSCOPY;  Surgeon: Chrystal Dias MD;  Location: [facility] OR 1ST FLR;  Service: Urology;  Laterality: N/A;    CYSTOSCOPY N/A 12/6/2023    Procedure: CYSTOSCOPY;  Surgeon: William Diaz MD;  Location: [facility] OR 1ST FLR;  Service: Urology;  Laterality: N/A;    CYSTOSCOPY W/ URETERAL STENT PLACEMENT Right 6/16/2021    Procedure: CYSTOSCOPY, WITH URETERAL STENT INSERTION;  Surgeon: William Diaz MD;  Location: [facility] OR 2ND FLR;  Service: Urology;  Laterality: Right;  FLUORO LESS THAN 1 HOUR    ENDOSCOPIC ULTRASOUND OF UPPER GASTROINTESTINAL TRACT N/A 2/26/2020    Procedure: ULTRASOUND, UPPER GI TRACT, ENDOSCOPIC;  Surgeon: Robbie Yang MD;  Location: [facility];  Service: Endoscopy;  Laterality: N/A;    ESOPHAGOGASTRODUODENOSCOPY N/A 7/8/2019    Procedure: ESOPHAGOGASTRODUODENOSCOPY (EGD);  Surgeon: Huan Brumfield MD;  Location: [facility];  Service: Endoscopy;  Laterality: N/A;    ESOPHAGOGASTRODUODENOSCOPY N/A 5/13/2021    Procedure: EGD (ESOPHAGOGASTRODUODENOSCOPY);  Surgeon: Huan Brumfield MD;  Location: [facility];  Service: Endoscopy;  Laterality: N/A;    EXCISION OF HYDROCELE Bilateral 6/16/2021    Procedure: HYDROCELE REPAIR;  Surgeon: William Diaz MD;  Location: [facility] OR 2ND FLR;  Service: Urology;  Laterality: Bilateral;  2 HOURS    EXTRACTION - STONE Right 12/6/2023    Procedure: EXTRACTION - STONE;  Surgeon: William Diaz MD;  Location: [facility] OR 1ST FLR;  Service: Urology;  Laterality: Right;    FLUOROSCOPY N/A 6/16/2021    Procedure: FLUOROSCOPY;  Surgeon: William Diaz MD;  Location: [facility] OR 2ND FLR;  Service: Urology;  Laterality: N/A;    GASTRECTOMY      INSERTION OF DIALYSIS CATHETER N/A 5/17/2019    Procedure: pleurx;  Surgeon: [facility] Diagnostic Provider;  Location: [facility] OR 2ND FLR;  Service: General;  Laterality: N/A;  Room 188/[place]    KNEE ARTHROSCOPY      LAPAROSCOPIC CHOLECYSTECTOMY N/A 5/4/2020    Procedure: CHOLECYSTECTOMY, LAPAROSCOPIC;  Surgeon: SON  Po Rowe MD;  Location: [facility] OR;  Service: General;  Laterality: N/A;    LASER LITHOTRIPSY N/A 6/30/2021    Procedure: LITHOTRIPSY, USING LASER;  Surgeon: William Diaz MD;  Location: [facility] OR 1ST FLR;  Service: Urology;  Laterality: N/A;    LIVER BIOPSY N/A 5/4/2020    Procedure: BIOPSY, LIVER, Laproscopic ;  Surgeon: SON Rowe MD;  Location: [facility] OR;  Service: General;  Laterality: N/A;    perianal surgery      perianal cyst removed    PYELOSCOPY Right 6/30/2021    Procedure: PYELOSCOPY;  Surgeon: William Diaz MD;  Location: [facility] OR 1ST FLR;  Service: Urology;  Laterality: Right;    PYELOSCOPY Right 10/16/2023    Procedure: PYELOSCOPY;  Surgeon: Chrystal Dias MD;  Location: [facility] OR 1ST FLR;  Service: Urology;  Laterality: Right;    PYELOSCOPY Right 12/6/2023    Procedure: PYELOSCOPY;  Surgeon: William Diaz MD;  Location: [facility] OR 1ST FLR;  Service: Urology;  Laterality: Right;    REMOVAL-STENT Right 12/6/2023    Procedure: REMOVAL-STENT;  Surgeon: William Diaz MD;  Location: [facility] OR 1ST FLR;  Service: Urology;  Laterality: Right;    REPLACEMENT OF STENT Right 6/30/2021    Procedure: REPLACEMENT, STENT;  Surgeon: William Diaz MD;  Location: [facility] OR 1ST FLR;  Service: Urology;  Laterality: Right;    RETROGRADE PYELOGRAPHY Right 10/16/2023    Procedure: PYELOGRAM, RETROGRADE;  Surgeon: Chrystal Dias MD;  Location: [facility] OR 1ST FLR;  Service: Urology;  Laterality: Right;    RETROGRADE PYELOGRAPHY Right 12/6/2023    Procedure: PYELOGRAM, RETROGRADE;  Surgeon: William Diaz MD;  Location: [facility] OR 1ST FLR;  Service: Urology;  Laterality: Right;    URETERAL STENT PLACEMENT Right 10/16/2023    Procedure: INSERTION, STENT, URETER;  Surgeon: Chrystal Dias MD;  Location: [facility] OR 1ST FLR;  Service: Urology;  Laterality: Right;    URETEROSCOPY Right 6/30/2021    Procedure: URETEROSCOPY FLEXIBLE URETEROSCOPE;  Surgeon: William Diaz MD;  Location: [facility] OR [address] [place] [address];   Service: Urology;  Laterality: Right;    URETEROSCOPY Right 10/16/2023    Procedure: URETEROSCOPY;  Surgeon: Chrystal Dias MD;  Location: [facility] OR [address] [place] [address];  Service: Urology;  Laterality: Right;    URETEROSCOPY Right 2023    Procedure: URETEROSCOPY;  Surgeon: William Diaz MD;  Location: [facility] OR [address] [place] [address];  Service: Urology;  Laterality: Right;       Family History   Problem Relation Age of Onset    Cancer Mother     Cancer Father     Heart disease Father     Obesity Sister     Parkinsonism Brother     No Known Problems Paternal Grandmother     Cancer Paternal Grandfather     Cancer Brother     Diabetes Maternal Grandmother     Stroke Maternal Grandfather     Cirrhosis Neg Hx        Social History     Socioeconomic History    Marital status:    Tobacco Use    Smoking status: Former     Current packs/day: 0.00     Average packs/day: 2.0 packs/day for 30.0 years (60.0 ttl pk-yrs)     Types: Cigarettes     Start date: 1975     Quit date: 2005     Years since quittin.1    Smokeless tobacco: Never   Substance and Sexual Activity    Alcohol use: No     Comment: started ~, reports 1 shot daily, max 3 shots daily, vague about alcohol consumption. Last drink 2018    Drug use: No     Social Determinants of Health     Financial Resource Strain: Low Risk  (10/13/2023)    Overall Financial Resource Strain (CARDIA)     Difficulty of Paying Living Expenses: Not very hard   Physical Activity: Unknown (10/13/2023)    Exercise Vital Sign     Days of Exercise per Week: 7 days     Minutes of Exercise per Session: Patient declined   Stress: Patient Declined (10/13/2023)    [employer] - Occupational Stress Questionnaire     Feeling of Stress : Patient declined   Social Connections: Moderately Isolated (10/13/2023)    Social Connection and Isolation Panel [NHANES]     Frequency of Communication with Friends and Family: More than three times a week     Frequency of  Social Gatherings with Friends and Family: More than three times a week     Attends Rastafari Services: Never     Active Member of Clubs or Organizations: No     Attends Club or Organization Meetings: Never     Marital Status:        Allergies:  Patient has no known allergies.    Medications:    Current Outpatient Medications:     acetaminophen (TYLENOL) 325 MG tablet, Take 2 tablets (650 mg total) by mouth every 8 (eight) hours as needed for Pain., Disp: 30 tablet, Rfl: 0    amLODIPine (NORVASC) 10 MG tablet, Take 1 tablet (10 mg total) by mouth once daily., Disp: 90 tablet, Rfl: 1    aspirin (ECOTRIN) 81 MG EC tablet, Take 1 tablet (81 mg total) by mouth once daily. (Patient not taking: Reported on 1/4/2024), Disp: 9 tablet, Rfl: 0    blood pressure monitor Kit, 1 kit by Misc.(Non-Drug; Combo Route) route 2 (two) times a day., Disp: 1 each, Rfl: 0    blood sugar diagnostic (ONETOUCH VERIO TEST STRIPS) Strp, 1 each by Misc.(Non-Drug; Combo Route) route 2 (two) times a day., Disp: 70 each, Rfl: 3    blood-glucose sensor (DEXCOM G6 SENSOR) Sandi, Inject 1 each into the skin every 10 days., Disp: 3 each, Rfl: 11    blood-glucose transmitter (DEXCOM G6 TRANSMITTER) Sandi, Inject 1 each into the skin every 10 days., Disp: 1 each, Rfl: 3    ergocalciferol (VITAMIN D2) 50,000 unit Cap, Take 1 capsule (50,000 Units total) by mouth every 7 days. for 12 doses, Disp: 12 capsule, Rfl: 0    glucagon (BAQSIMI) 3 mg/actuation Spry, 1 each by Nasal route daily as needed (if glucose is less than 70 - can repeat in 1 hour if still low/less than 70)., Disp: 2 each, Rfl: 3    HUMALOG KWIKPEN INSULIN 100 unit/mL pen, Inject 5 Units into the skin before meals as needed (before each meal - if OFF of insulin pump). This is emergency back up insulin to use with meals if OFF of insulin pump, Disp: 15 mL, Rfl: 3    hydrALAZINE (APRESOLINE) 25 MG tablet, Take 1 tablet (25 mg total) by mouth every 8 (eight) hours., Disp: 270 tablet, Rfl:  "1    insulin detemir U-100, Levemir, 100 unit/mL (3 mL) SubQ InPn pen, Inject 30 Units into the skin every evening. This is emergency back up insulin only if OFF of your insulin pump, Disp: 15 mL, Rfl: 3    insulin lispro-aabc (LYUMJEV U-100 INSULIN) 100 unit/mL, Inject 80 Units into the skin continuous. Uses up to 80 units daily with omnipod 5 insulin pump as directed., Disp: 80 mL, Rfl: 6    insulin pump cart,automated,BT (OMNIPOD 5 G6 PODS, GEN 5,) Crtg, INJECT 1 EACH INTO THE SKIN EVERY OTHER DAY., Disp: 15 each, Rfl: 1    lancets (ONETOUCH DELICA PLUS LANCET) 33 gauge Misc, 1 lancet  by Misc.(Non-Drug; Combo Route) route 2 (two) times daily., Disp: 70 each, Rfl: 3    patiromer calcium sorbitex (VELTASSA) 8.4 gram PwPk, Take 1 packet (8.4 g total) by mouth once daily. for 180 doses, Disp: 90 packet, Rfl: 1    pen needle, diabetic 32 gauge x 5/32" Ndle, Use with toujeo insulin one daily only as Emergency use/back up insulin - if OFF OF your insulin pump., Disp: 30 each, Rfl: 3    torsemide (DEMADEX) 20 MG Tab, Take 1 tablet (20 mg total) by mouth once daily., Disp: 90 tablet, Rfl: 1    PHYSICAL EXAMINATION:    Constitutional: He appears well-developed and well-nourished.  He is in no apparent distress.    Eyes: No scleral icterus noted bilaterally. No discharge bilaterally.    Nose: No rhinorrhea    Cardiovascular: Normal rate.      Pulmonary/Chest: Effort normal. No respiratory distress.     Abdominal:  He exhibits no distension.      Neurological: He is alert and oriented to person, place, and time.     Psych: Cooperative with normal affect.    No CVA tenderness noted.  Normal testicular exam on both sides.  Slightly tender on the right epid? On palpation.  Np swelling or erythema noted in the scrotum.      Lab Results   Component Value Date    PSA 1.2 04/06/2023    PSA 1.2 03/16/2022      US 10/13/23  Mild right hydronephrosis, distal ureteral calculus not excluded noting only left ureteral jet is able to be " identified.  Developing obstructive uropathy is of concern.  Correlation is advised.     Renal findings suggest chronic medical renal disease.     Right pleural effusion.     Right adrenal nodule.    CT 10/12/23  Mild right hydronephrosis and right hydroureter with gradual tapering without evidence for ureteral calculus, these findings may relate to sequelae of recently passed calculus, clinical and historical correlation is needed.     Mild perinephric stranding bilaterally, nonspecific however correlation for UTI/pyelonephritis is needed.     Mild urinary bladder wall thickening may relate to incomplete distention however correlation for UTI/cystitis is needed.     Mild circumferential thickening of the distal descending colon/proximal sigmoid colon with mild pericolonic haziness, correlation for mild colitis to include mild diverticulitis is needed.     Right adrenal nodule likely representing adrenal adenoma.    CT RSS 10/3/23 at   There is no evidence for calyceal stone in the right kidney. There is linear vascular calcification the anterior upper pole of the right kidney. There are new hyperdense foci in the mid left kidney, which may represent small hyperdense cysts. There is very mild fullness of the proximal right renal collecting system, but without evidence for a ureteral calculus. The remainder of the right ureter is decompressed. The urinary bladder is decompressed. There is asymmetric soft tissue stranding of the right perinephric fat. The possibility of right pyelonephritis should be considered in the differential.      CT RSS 6/27/22  Nonobstructing 10 mm left renal stone.  No obstructive uropathy.   Fecalization of distal small bowel loops, suggesting slow transit.  Enlarged prostate.   Mildly nodular hepatic contour.  Correlate for chronic liver disease.      Urine pH 6, +++ protein, 500 Glucose, 50 + blood today    KUB today 10/9/23  Surgical clips right upper quadrant and right paracentral  pelvis, 7 mm diagonal vascular calcification overlies left renal nicole, stable.  Nonobstructive tiny stone right mid renal pole and 9.5 mm size stone left mid renal parenchyma cannot be discriminated.  Kidneys obscured by overlying bowel structures, and no stones identified region of ureters and urinary bladder.       IMPRESSION:  Bilateral kidney stones    Calculus of urinary tract in diseases classified elsewhere  -     CT Renal Stone Study ABD Pelvis WO; Future; Expected date: 03/08/2024      S/p right ureteroscopy with stone removal 10/2023.  CT RSS showed a small non-obstructive stone in the right upper pole and another one in the left kidney.  No surgical intervention needed.  Will follow up in 1 year with CT RSS.  Recommend to drink plenty of water, high amount of citric acid.  Follow up with Dr. Keith, his nephrologist.  Continue to get a better control of his diabetes and HTN.    I spent 25 minutes with the patient of which more than half was spent in direct consultation with the patient in regards to our treatment and plan.       PLAN:  Follow up in about 1 year (around 3/8/2025), or if symptoms worsen or fail to improve, for CT RSS.

## 2024-03-11 ENCOUNTER — OFFICE VISIT (OUTPATIENT)
Dept: NEPHROLOGY | Facility: CLINIC | Age: 69
End: 2024-03-11
Payer: MEDICARE

## 2024-03-11 VITALS
HEART RATE: 85 BPM | DIASTOLIC BLOOD PRESSURE: 93 MMHG | OXYGEN SATURATION: 99 % | SYSTOLIC BLOOD PRESSURE: 161 MMHG | WEIGHT: 302 LBS | BODY MASS INDEX: 47.3 KG/M2

## 2024-03-11 DIAGNOSIS — D63.1 ANEMIA IN CHRONIC KIDNEY DISEASE, UNSPECIFIED CKD STAGE: ICD-10-CM

## 2024-03-11 DIAGNOSIS — N18.9 ANEMIA IN CHRONIC KIDNEY DISEASE, UNSPECIFIED CKD STAGE: ICD-10-CM

## 2024-03-11 DIAGNOSIS — N18.4 CHRONIC KIDNEY DISEASE, STAGE 4 (SEVERE): Primary | ICD-10-CM

## 2024-03-11 DIAGNOSIS — N18.4 TYPE 2 DIABETES MELLITUS WITH STAGE 4 CHRONIC KIDNEY DISEASE, UNSPECIFIED WHETHER LONG TERM INSULIN USE: ICD-10-CM

## 2024-03-11 DIAGNOSIS — E11.22 TYPE 2 DIABETES MELLITUS WITH STAGE 4 CHRONIC KIDNEY DISEASE, UNSPECIFIED WHETHER LONG TERM INSULIN USE: ICD-10-CM

## 2024-03-11 DIAGNOSIS — N25.81 SECONDARY HYPERPARATHYROIDISM: ICD-10-CM

## 2024-03-11 DIAGNOSIS — I50.9 CONGESTIVE HEART FAILURE, UNSPECIFIED HF CHRONICITY, UNSPECIFIED HEART FAILURE TYPE: ICD-10-CM

## 2024-03-11 DIAGNOSIS — E66.01 SEVERE OBESITY (BMI >= 40): ICD-10-CM

## 2024-03-11 PROCEDURE — 3062F POS MACROALBUMINURIA REV: CPT | Mod: CPTII,S$GLB,, | Performed by: INTERNAL MEDICINE

## 2024-03-11 PROCEDURE — 3080F DIAST BP >= 90 MM HG: CPT | Mod: CPTII,S$GLB,, | Performed by: INTERNAL MEDICINE

## 2024-03-11 PROCEDURE — 1101F PT FALLS ASSESS-DOCD LE1/YR: CPT | Mod: CPTII,S$GLB,, | Performed by: INTERNAL MEDICINE

## 2024-03-11 PROCEDURE — 3066F NEPHROPATHY DOC TX: CPT | Mod: CPTII,S$GLB,, | Performed by: INTERNAL MEDICINE

## 2024-03-11 PROCEDURE — 3044F HG A1C LEVEL LT 7.0%: CPT | Mod: CPTII,S$GLB,, | Performed by: INTERNAL MEDICINE

## 2024-03-11 PROCEDURE — 1159F MED LIST DOCD IN RCRD: CPT | Mod: CPTII,S$GLB,, | Performed by: INTERNAL MEDICINE

## 2024-03-11 PROCEDURE — 99214 OFFICE O/P EST MOD 30 MIN: CPT | Mod: S$GLB,,, | Performed by: INTERNAL MEDICINE

## 2024-03-11 PROCEDURE — 2023F DILAT RTA XM W/O RTNOPTHY: CPT | Mod: CPTII,S$GLB,, | Performed by: INTERNAL MEDICINE

## 2024-03-11 PROCEDURE — 3008F BODY MASS INDEX DOCD: CPT | Mod: CPTII,S$GLB,, | Performed by: INTERNAL MEDICINE

## 2024-03-11 PROCEDURE — 3288F FALL RISK ASSESSMENT DOCD: CPT | Mod: CPTII,S$GLB,, | Performed by: INTERNAL MEDICINE

## 2024-03-11 PROCEDURE — 3077F SYST BP >= 140 MM HG: CPT | Mod: CPTII,S$GLB,, | Performed by: INTERNAL MEDICINE

## 2024-03-11 PROCEDURE — 1126F AMNT PAIN NOTED NONE PRSNT: CPT | Mod: CPTII,S$GLB,, | Performed by: INTERNAL MEDICINE

## 2024-03-11 PROCEDURE — 1160F RVW MEDS BY RX/DR IN RCRD: CPT | Mod: CPTII,S$GLB,, | Performed by: INTERNAL MEDICINE

## 2024-03-11 PROCEDURE — 99999 PR PBB SHADOW E&M-EST. PATIENT-LVL IV: CPT | Mod: PBBFAC,,, | Performed by: INTERNAL MEDICINE

## 2024-03-24 DIAGNOSIS — E13.9 LADA (LATENT AUTOIMMUNE DIABETES IN ADULTS), MANAGED AS TYPE 1: ICD-10-CM

## 2024-03-24 NOTE — TELEPHONE ENCOUNTER
No care due was identified.  [facility] Embedded Care Due Messages. Reference number: [id number].   3/24/2024 10:57:51 AM CDT

## 2024-03-25 RX ORDER — BLOOD-GLUCOSE SENSOR
1 EACH MISCELLANEOUS
Qty: 9 EACH | Refills: 3 | Status: SHIPPED | OUTPATIENT
Start: 2024-03-25

## 2024-03-25 NOTE — TELEPHONE ENCOUNTER
Jian Arrieta  is requesting a refill authorization.  Brief Assessment and Rationale for Refill:  Approve     Medication Therapy Plan:         Comments:     Note composed:6:16 AM 03/25/2024

## 2024-04-11 NOTE — ASSESSMENT & PLAN NOTE
Hx of EF 30 %  ECHO 6/19:   Mildly decreased left ventricular systolic function. The estimated ejection fraction is 45-50%   Left ventricular diastolic dysfunction.   Normal right ventricular systolic function.   Normal central venous pressure (3 mm Hg).   Extremely technically challenging study, poor endocardial visualization, would recommend repeating with contrast if additional information is desired.    10/13- dc IVF until echo returns and Nephrology see pt.   ECHO 10/13:   Left Ventricle: The left ventricle is normal in size. Normal wall thickness. Septal motion is consistent with post-operative status. There is low normal systolic function with a visually estimated ejection fraction of 50 - 55%. There is normal diastolic function.    Right Ventricle: Normal right ventricular cavity size. Wall thickness is normal. Right ventricle wall motion  is normal. Systolic function is normal.    Pulmonary Artery: The estimated pulmonary artery systolic pressure is 21 mmHg.    IVC/SVC: Normal venous pressure at 3 mmHg.    10/15- STABLE, On NS 125cc/h   [FreeTextEntry1] : Continue prenatal vitamins Dietary advice Follow-up with Dr. Rebollar as scheduled

## 2024-04-19 ENCOUNTER — PATIENT MESSAGE (OUTPATIENT)
Dept: ADMINISTRATIVE | Facility: HOSPITAL | Age: 69
End: 2024-04-19
Payer: MEDICARE

## 2024-04-22 DIAGNOSIS — E13.9 LADA (LATENT AUTOIMMUNE DIABETES IN ADULTS), MANAGED AS TYPE 1: ICD-10-CM

## 2024-04-22 RX ORDER — INSULIN PMP CART,AUT,G6/7,CNTR
EACH SUBCUTANEOUS
Qty: 15 EACH | Refills: 1 | Status: SHIPPED | OUTPATIENT
Start: 2024-04-22

## 2024-05-22 ENCOUNTER — LAB VISIT (OUTPATIENT)
Dept: LAB | Facility: HOSPITAL | Age: 69
End: 2024-05-22
Attending: INTERNAL MEDICINE
Payer: MEDICARE

## 2024-05-22 DIAGNOSIS — N18.30 TYPE 2 DIABETES MELLITUS WITH STAGE 3 CHRONIC KIDNEY DISEASE, WITH LONG-TERM CURRENT USE OF INSULIN, UNSPECIFIED WHETHER STAGE 3A OR 3B CKD: ICD-10-CM

## 2024-05-22 DIAGNOSIS — E11.22 TYPE 2 DIABETES MELLITUS WITH STAGE 3 CHRONIC KIDNEY DISEASE, WITH LONG-TERM CURRENT USE OF INSULIN, UNSPECIFIED WHETHER STAGE 3A OR 3B CKD: ICD-10-CM

## 2024-05-22 DIAGNOSIS — Z79.4 TYPE 2 DIABETES MELLITUS WITH STAGE 3 CHRONIC KIDNEY DISEASE, WITH LONG-TERM CURRENT USE OF INSULIN, UNSPECIFIED WHETHER STAGE 3A OR 3B CKD: ICD-10-CM

## 2024-05-22 DIAGNOSIS — E11.42 DIABETIC POLYNEUROPATHY ASSOCIATED WITH TYPE 2 DIABETES MELLITUS: ICD-10-CM

## 2024-05-22 DIAGNOSIS — I25.84 CORONARY ARTERY DISEASE DUE TO CALCIFIED CORONARY LESION: ICD-10-CM

## 2024-05-22 DIAGNOSIS — I25.10 CORONARY ARTERY DISEASE DUE TO CALCIFIED CORONARY LESION: ICD-10-CM

## 2024-05-22 DIAGNOSIS — Z12.5 ENCOUNTER FOR SCREENING FOR MALIGNANT NEOPLASM OF PROSTATE: ICD-10-CM

## 2024-05-22 LAB
ALBUMIN SERPL BCP-MCNC: 2.5 G/DL (ref 3.5–5.2)
ALP SERPL-CCNC: 143 U/L (ref 55–135)
ALT SERPL W/O P-5'-P-CCNC: 37 U/L (ref 10–44)
ANION GAP SERPL CALC-SCNC: 10 MMOL/L (ref 8–16)
AST SERPL-CCNC: 54 U/L (ref 10–40)
BASOPHILS # BLD AUTO: 0.04 K/UL (ref 0–0.2)
BASOPHILS NFR BLD: 0.4 % (ref 0–1.9)
BILIRUB SERPL-MCNC: 0.3 MG/DL (ref 0.1–1)
BUN SERPL-MCNC: 31 MG/DL (ref 8–23)
CALCIUM SERPL-MCNC: 8.6 MG/DL (ref 8.7–10.5)
CHLORIDE SERPL-SCNC: 109 MMOL/L (ref 95–110)
CHOLEST SERPL-MCNC: 101 MG/DL (ref 120–199)
CHOLEST/HDLC SERPL: 2.5 {RATIO} (ref 2–5)
CO2 SERPL-SCNC: 20 MMOL/L (ref 23–29)
COMPLEXED PSA SERPL-MCNC: 1.8 NG/ML (ref 0–4)
CREAT SERPL-MCNC: 3 MG/DL (ref 0.5–1.4)
DIFFERENTIAL METHOD BLD: ABNORMAL
EOSINOPHIL # BLD AUTO: 0.2 K/UL (ref 0–0.5)
EOSINOPHIL NFR BLD: 2.4 % (ref 0–8)
ERYTHROCYTE [DISTWIDTH] IN BLOOD BY AUTOMATED COUNT: 13.3 % (ref 11.5–14.5)
EST. GFR  (NO RACE VARIABLE): 21.8 ML/MIN/1.73 M^2
ESTIMATED AVG GLUCOSE: 160 MG/DL (ref 68–131)
GLUCOSE SERPL-MCNC: 151 MG/DL (ref 70–110)
HBA1C MFR BLD: 7.2 % (ref 4–5.6)
HCT VFR BLD AUTO: 38.7 % (ref 40–54)
HDLC SERPL-MCNC: 41 MG/DL (ref 40–75)
HDLC SERPL: 40.6 % (ref 20–50)
HGB BLD-MCNC: 12.4 G/DL (ref 14–18)
IMM GRANULOCYTES # BLD AUTO: 0.03 K/UL (ref 0–0.04)
IMM GRANULOCYTES NFR BLD AUTO: 0.3 % (ref 0–0.5)
LDLC SERPL CALC-MCNC: 44.4 MG/DL (ref 63–159)
LYMPHOCYTES # BLD AUTO: 1.6 K/UL (ref 1–4.8)
LYMPHOCYTES NFR BLD: 17.7 % (ref 18–48)
MCH RBC QN AUTO: 29.8 PG (ref 27–31)
MCHC RBC AUTO-ENTMCNC: 32 G/DL (ref 32–36)
MCV RBC AUTO: 93 FL (ref 82–98)
MONOCYTES # BLD AUTO: 0.6 K/UL (ref 0.3–1)
MONOCYTES NFR BLD: 6.4 % (ref 4–15)
NEUTROPHILS # BLD AUTO: 6.6 K/UL (ref 1.8–7.7)
NEUTROPHILS NFR BLD: 72.8 % (ref 38–73)
NONHDLC SERPL-MCNC: 60 MG/DL
NRBC BLD-RTO: 0 /100 WBC
PLATELET # BLD AUTO: 252 K/UL (ref 150–450)
PMV BLD AUTO: 10.9 FL (ref 9.2–12.9)
POTASSIUM SERPL-SCNC: 4.5 MMOL/L (ref 3.5–5.1)
PROT SERPL-MCNC: 6.4 G/DL (ref 6–8.4)
RBC # BLD AUTO: 4.16 M/UL (ref 4.6–6.2)
SODIUM SERPL-SCNC: 139 MMOL/L (ref 136–145)
T4 FREE SERPL-MCNC: 0.82 NG/DL (ref 0.71–1.51)
TRIGL SERPL-MCNC: 78 MG/DL (ref 30–150)
TSH SERPL DL<=0.005 MIU/L-ACNC: 4.24 UIU/ML (ref 0.4–4)
WBC # BLD AUTO: 9.11 K/UL (ref 3.9–12.7)

## 2024-05-22 PROCEDURE — 84439 ASSAY OF FREE THYROXINE: CPT | Performed by: INTERNAL MEDICINE

## 2024-05-22 PROCEDURE — 83036 HEMOGLOBIN GLYCOSYLATED A1C: CPT | Performed by: INTERNAL MEDICINE

## 2024-05-22 PROCEDURE — 80053 COMPREHEN METABOLIC PANEL: CPT | Performed by: INTERNAL MEDICINE

## 2024-05-22 PROCEDURE — 85025 COMPLETE CBC W/AUTO DIFF WBC: CPT | Performed by: INTERNAL MEDICINE

## 2024-05-22 PROCEDURE — 84443 ASSAY THYROID STIM HORMONE: CPT | Performed by: INTERNAL MEDICINE

## 2024-05-22 PROCEDURE — 36415 COLL VENOUS BLD VENIPUNCTURE: CPT | Mod: PO | Performed by: INTERNAL MEDICINE

## 2024-05-22 PROCEDURE — 80061 LIPID PANEL: CPT | Performed by: INTERNAL MEDICINE

## 2024-05-22 PROCEDURE — 84153 ASSAY OF PSA TOTAL: CPT | Performed by: INTERNAL MEDICINE

## 2024-05-29 ENCOUNTER — OFFICE VISIT (OUTPATIENT)
Dept: INTERNAL MEDICINE | Facility: CLINIC | Age: 69
End: 2024-05-29
Payer: MEDICARE

## 2024-05-29 VITALS
WEIGHT: 293.19 LBS | OXYGEN SATURATION: 99 % | BODY MASS INDEX: 46.02 KG/M2 | HEIGHT: 67 IN | HEART RATE: 73 BPM | SYSTOLIC BLOOD PRESSURE: 124 MMHG | DIASTOLIC BLOOD PRESSURE: 70 MMHG | TEMPERATURE: 99 F | RESPIRATION RATE: 16 BRPM

## 2024-05-29 DIAGNOSIS — I70.0 ATHEROSCLEROSIS OF AORTA: ICD-10-CM

## 2024-05-29 DIAGNOSIS — E78.5 HYPERLIPIDEMIA ASSOCIATED WITH TYPE 2 DIABETES MELLITUS: Chronic | ICD-10-CM

## 2024-05-29 DIAGNOSIS — Z79.4 TYPE 2 DIABETES MELLITUS WITH STAGE 4 CHRONIC KIDNEY DISEASE, WITH LONG-TERM CURRENT USE OF INSULIN: ICD-10-CM

## 2024-05-29 DIAGNOSIS — I81 PORTAL HYPERTENSION DUE TO OBSTRUCTION OF EXTRAHEPATIC PORTAL VEIN: Chronic | ICD-10-CM

## 2024-05-29 DIAGNOSIS — I70.0 AORTIC ATHEROSCLEROSIS: ICD-10-CM

## 2024-05-29 DIAGNOSIS — E11.21 DIABETIC NEPHROPATHY ASSOCIATED WITH TYPE 2 DIABETES MELLITUS: ICD-10-CM

## 2024-05-29 DIAGNOSIS — I48.0 PAROXYSMAL ATRIAL FIBRILLATION: ICD-10-CM

## 2024-05-29 DIAGNOSIS — I89.0 LYMPHEDEMA OF BOTH LOWER EXTREMITIES: Chronic | ICD-10-CM

## 2024-05-29 DIAGNOSIS — I25.10 CORONARY ARTERY DISEASE DUE TO CALCIFIED CORONARY LESION: Chronic | ICD-10-CM

## 2024-05-29 DIAGNOSIS — Z79.4 TYPE 2 DIABETES MELLITUS WITH DIABETIC NEUROPATHY, WITH LONG-TERM CURRENT USE OF INSULIN: Chronic | ICD-10-CM

## 2024-05-29 DIAGNOSIS — Z91.199 MEDICALLY NONCOMPLIANT: ICD-10-CM

## 2024-05-29 DIAGNOSIS — N18.4 TYPE 2 DIABETES MELLITUS WITH STAGE 4 CHRONIC KIDNEY DISEASE, WITH LONG-TERM CURRENT USE OF INSULIN: ICD-10-CM

## 2024-05-29 DIAGNOSIS — E11.42 DIABETIC POLYNEUROPATHY ASSOCIATED WITH TYPE 2 DIABETES MELLITUS: Primary | ICD-10-CM

## 2024-05-29 DIAGNOSIS — N13.30 HYDRONEPHROSIS, UNSPECIFIED HYDRONEPHROSIS TYPE: ICD-10-CM

## 2024-05-29 DIAGNOSIS — Z98.84 STATUS POST BARIATRIC SURGERY: ICD-10-CM

## 2024-05-29 DIAGNOSIS — K74.00 HEPATIC FIBROSIS: ICD-10-CM

## 2024-05-29 DIAGNOSIS — I87.2 VENOUS INSUFFICIENCY OF BOTH LOWER EXTREMITIES: ICD-10-CM

## 2024-05-29 DIAGNOSIS — N18.4 STAGE 4 CHRONIC KIDNEY DISEASE: ICD-10-CM

## 2024-05-29 DIAGNOSIS — K74.69 OTHER CIRRHOSIS OF LIVER: ICD-10-CM

## 2024-05-29 DIAGNOSIS — G47.30 SLEEP APNEA, UNSPECIFIED TYPE: ICD-10-CM

## 2024-05-29 DIAGNOSIS — K76.6 PORTAL HYPERTENSION DUE TO OBSTRUCTION OF EXTRAHEPATIC PORTAL VEIN: Chronic | ICD-10-CM

## 2024-05-29 DIAGNOSIS — D64.9 ANEMIA, UNSPECIFIED TYPE: ICD-10-CM

## 2024-05-29 DIAGNOSIS — I25.84 CORONARY ARTERY DISEASE DUE TO CALCIFIED CORONARY LESION: Chronic | ICD-10-CM

## 2024-05-29 DIAGNOSIS — E11.22 TYPE 2 DIABETES MELLITUS WITH STAGE 4 CHRONIC KIDNEY DISEASE, WITH LONG-TERM CURRENT USE OF INSULIN: ICD-10-CM

## 2024-05-29 DIAGNOSIS — Z95.1 HX OF CABG: ICD-10-CM

## 2024-05-29 DIAGNOSIS — E13.9 LADA (LATENT AUTOIMMUNE DIABETES IN ADULTS), MANAGED AS TYPE 1: ICD-10-CM

## 2024-05-29 DIAGNOSIS — E11.69 HYPERLIPIDEMIA ASSOCIATED WITH TYPE 2 DIABETES MELLITUS: Chronic | ICD-10-CM

## 2024-05-29 DIAGNOSIS — E11.40 TYPE 2 DIABETES MELLITUS WITH DIABETIC NEUROPATHY, WITH LONG-TERM CURRENT USE OF INSULIN: Chronic | ICD-10-CM

## 2024-05-29 DIAGNOSIS — K86.1 OTHER CHRONIC PANCREATITIS: ICD-10-CM

## 2024-05-29 DIAGNOSIS — N20.1 OBSTRUCTION OF RIGHT URETEROPELVIC JUNCTION (UPJ) DUE TO STONE: ICD-10-CM

## 2024-05-29 DIAGNOSIS — I50.42 CHRONIC COMBINED SYSTOLIC AND DIASTOLIC CONGESTIVE HEART FAILURE: ICD-10-CM

## 2024-05-29 PROBLEM — E66.2 OBESITY HYPOVENTILATION SYNDROME: Chronic | Status: RESOLVED | Noted: 2019-05-06 | Resolved: 2024-05-29

## 2024-05-29 PROCEDURE — 1160F RVW MEDS BY RX/DR IN RCRD: CPT | Mod: CPTII,S$GLB,, | Performed by: INTERNAL MEDICINE

## 2024-05-29 PROCEDURE — 3288F FALL RISK ASSESSMENT DOCD: CPT | Mod: CPTII,S$GLB,, | Performed by: INTERNAL MEDICINE

## 2024-05-29 PROCEDURE — 3078F DIAST BP <80 MM HG: CPT | Mod: CPTII,S$GLB,, | Performed by: INTERNAL MEDICINE

## 2024-05-29 PROCEDURE — 99214 OFFICE O/P EST MOD 30 MIN: CPT | Mod: S$GLB,,, | Performed by: INTERNAL MEDICINE

## 2024-05-29 PROCEDURE — 3066F NEPHROPATHY DOC TX: CPT | Mod: CPTII,S$GLB,, | Performed by: INTERNAL MEDICINE

## 2024-05-29 PROCEDURE — 99999 PR PBB SHADOW E&M-EST. PATIENT-LVL V: CPT | Mod: PBBFAC,,, | Performed by: INTERNAL MEDICINE

## 2024-05-29 PROCEDURE — 3051F HG A1C>EQUAL 7.0%<8.0%: CPT | Mod: CPTII,S$GLB,, | Performed by: INTERNAL MEDICINE

## 2024-05-29 PROCEDURE — 3062F POS MACROALBUMINURIA REV: CPT | Mod: CPTII,S$GLB,, | Performed by: INTERNAL MEDICINE

## 2024-05-29 PROCEDURE — 3074F SYST BP LT 130 MM HG: CPT | Mod: CPTII,S$GLB,, | Performed by: INTERNAL MEDICINE

## 2024-05-29 PROCEDURE — 3008F BODY MASS INDEX DOCD: CPT | Mod: CPTII,S$GLB,, | Performed by: INTERNAL MEDICINE

## 2024-05-29 PROCEDURE — 2023F DILAT RTA XM W/O RTNOPTHY: CPT | Mod: CPTII,S$GLB,, | Performed by: INTERNAL MEDICINE

## 2024-05-29 PROCEDURE — 1125F AMNT PAIN NOTED PAIN PRSNT: CPT | Mod: CPTII,S$GLB,, | Performed by: INTERNAL MEDICINE

## 2024-05-29 PROCEDURE — 1159F MED LIST DOCD IN RCRD: CPT | Mod: CPTII,S$GLB,, | Performed by: INTERNAL MEDICINE

## 2024-05-29 PROCEDURE — G2211 COMPLEX E/M VISIT ADD ON: HCPCS | Mod: S$GLB,,, | Performed by: INTERNAL MEDICINE

## 2024-05-29 PROCEDURE — 1101F PT FALLS ASSESS-DOCD LE1/YR: CPT | Mod: CPTII,S$GLB,, | Performed by: INTERNAL MEDICINE

## 2024-05-29 RX ORDER — ROSUVASTATIN CALCIUM 20 MG/1
20 TABLET, COATED ORAL DAILY
COMMUNITY

## 2024-05-29 NOTE — PROGRESS NOTES
Subjective     Patient ID: Jian Arrieta is a 68 y.o. male.    Chief Complaint: Follow-up    HPI  Pt with T2DM with neuropathy, CAD/PAF, HTN, HLD, CKD III, Atherosclerosis, Anemia, Morbid Obesity(hx of sleeve gastrectomy), Chronic LE lymphedema, BERHANE, Ascites/portal HTN, Chronic pancreatitis, Hx of nephrolithiasis is here for f/u. Since last visit he has established with a new cardiologist. Pt is not taking Coreg, Crestor, Norvasc, Aspirin and Turosemide.   Review of Systems   Constitutional:  Negative for activity change, appetite change, chills, diaphoresis, fatigue, fever and unexpected weight change.   HENT:  Negative for postnasal drip, rhinorrhea, sinus pressure/congestion, sneezing, sore throat, trouble swallowing and voice change.    Respiratory:  Negative for cough, shortness of breath and wheezing.    Cardiovascular:  Positive for leg swelling. Negative for chest pain and palpitations.   Gastrointestinal:  Negative for abdominal pain, blood in stool, constipation, diarrhea, nausea and vomiting.   Genitourinary:  Negative for dysuria.   Musculoskeletal:  Negative for arthralgias and myalgias.   Integumentary:  Negative for rash and wound.   Allergic/Immunologic: Negative for environmental allergies and food allergies.   Hematological:  Negative for adenopathy. Does not bruise/bleed easily.          Objective     Physical Exam  Constitutional:       General: He is not in acute distress.     Appearance: Normal appearance. He is well-developed. He is not diaphoretic.   HENT:      Head: Normocephalic and atraumatic.      Right Ear: External ear normal.      Left Ear: External ear normal.      Nose: Nose normal.      Mouth/Throat:      Pharynx: No oropharyngeal exudate.   Eyes:      General: No scleral icterus.        Right eye: No discharge.         Left eye: No discharge.      Conjunctiva/sclera: Conjunctivae normal.      Pupils: Pupils are equal, round, and reactive to light.   Neck:      Vascular: No JVD.    Cardiovascular:      Rate and Rhythm: Normal rate and regular rhythm.      Pulses: Normal pulses.      Heart sounds: Normal heart sounds. No murmur heard.  Pulmonary:      Effort: Pulmonary effort is normal. No respiratory distress.      Breath sounds: Normal breath sounds. No wheezing or rales.   Abdominal:      General: Bowel sounds are normal.      Tenderness: There is no abdominal tenderness. There is no guarding or rebound.   Musculoskeletal:      Cervical back: Normal range of motion and neck supple.      Comments: Legs wrapped   Lymphadenopathy:      Cervical: No cervical adenopathy.   Skin:     General: Skin is warm and dry.      Capillary Refill: Capillary refill takes less than 2 seconds.      Coloration: Skin is not pale.      Findings: No rash.   Neurological:      Mental Status: He is alert and oriented to person, place, and time. Mental status is at baseline.      Cranial Nerves: No cranial nerve deficit.   Psychiatric:         Mood and Affect: Mood normal.         Behavior: Behavior normal.            Assessment and Plan     1. Diabetic polyneuropathy associated with type 2 diabetes mellitus    2. Coronary artery disease due to calcified coronary lesion  Overview:  5 stents on ASA        3. Sleep apnea, unspecified type    4. Lymphedema of both lower extremities  -     Ambulatory referral/consult to Physical/Occupational Therapy; Future; Expected date: 06/05/2024    5. Medically noncompliant    6. Portal hypertension due to obstruction of extrahepatic portal vein    7. Hepatic fibrosis    8. Other chronic pancreatitis    9. Other cirrhosis of liver    10. Type 2 diabetes mellitus with stage 4 chronic kidney disease, with long-term current use of insulin    11. Type 2 diabetes mellitus with diabetic neuropathy, with long-term current use of insulin    12. Status post bariatric surgery    13. 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 Anemia, unspecified type    15. Diabetic  nephropathy associated with type 2 diabetes mellitus    16. Obstruction of right ureteropelvic junction (UPJ) due to stone    17. Chronic combined systolic and diastolic congestive heart failure    18. Venous insufficiency of both lower extremities    19. Hyperlipidemia associated with type 2 diabetes mellitus    20. Paroxysmal atrial fibrillation    21. Aortic atherosclerosis    22. Atherosclerosis of aorta    23. Hx of CABG    24. Stage 4 chronic kidney disease    25. Hydronephrosis, unspecified hydronephrosis type        Obstructive uropathy/right hydronephrosis- 2/2 kidney stone   -s/p removal with ureteral stent placement, followed by Urology     Ascites/portal HTN/Liver fibrosis- significant improvement s/p venoplasty for portal vein occlusion        Followed by Hepatology       T2DM with neuropathy/CKD- last A1C of 7.2(5/24)<--6.7(1/24)<--7.6(10/23)<--8.1(3/22)<--7.1(5/19)<--7.4(3/19)<--6.9(1/19)       -followed by Endo      CHF/PAF- stable   -pt declining anticoagulation, was advised of the risk      CAD with hx of CABG- stable, CPT      CKD IV- followed by nephrology       HTN- controlled on Coreg/Norvasc/Hydralazine      HLD- stable      Morbid Obesity- pt advised on proper diet/exercise for weight loss      Chronic LE lymphedema with stasis dermatitis- stable      NC/NC Anemia- stable      BERHANE- pt not using his CPAP      Aortic atherosclerosis- stable      Chronic pancreatitis- stable       Hyperparathyroidism- stable      Medical noncompliance

## 2024-06-20 ENCOUNTER — CLINICAL SUPPORT (OUTPATIENT)
Dept: REHABILITATION | Facility: HOSPITAL | Age: 69
End: 2024-06-20
Payer: MEDICARE

## 2024-06-20 ENCOUNTER — PATIENT MESSAGE (OUTPATIENT)
Dept: PODIATRY | Facility: CLINIC | Age: 69
End: 2024-06-20
Payer: MEDICARE

## 2024-06-20 ENCOUNTER — TELEPHONE (OUTPATIENT)
Dept: PODIATRY | Facility: CLINIC | Age: 69
End: 2024-06-20
Payer: MEDICARE

## 2024-06-20 DIAGNOSIS — I87.2 VENOUS STASIS DERMATITIS OF BOTH LOWER EXTREMITIES: Primary | ICD-10-CM

## 2024-06-20 DIAGNOSIS — I89.0 LYMPHEDEMA OF BOTH LOWER EXTREMITIES: Chronic | ICD-10-CM

## 2024-06-20 DIAGNOSIS — I87.2 VENOUS INSUFFICIENCY OF BOTH LOWER EXTREMITIES: ICD-10-CM

## 2024-06-20 PROCEDURE — 97163 PT EVAL HIGH COMPLEX 45 MIN: CPT

## 2024-06-20 PROCEDURE — 97535 SELF CARE MNGMENT TRAINING: CPT

## 2024-06-20 NOTE — PROGRESS NOTES
See evaluation in POC for goals and assessment     Eval Date: 07/03/2024    Luisa Thompson, PT, DPT, CLT

## 2024-06-20 NOTE — TELEPHONE ENCOUNTER
Good morning Dr. Pavon     I have had some worsening symptoms in my feet and lower legs, including wounds that I would like to have looked at by you, if you have some availability. My physical therapist has uploaded pictures into my chart under the media section. Please let me know what you think and thank you for your time.         Called patient let him know the soonest available appointment we have is 06/26 at 1:45 he accepted the appointment.

## 2024-06-21 ENCOUNTER — TELEPHONE (OUTPATIENT)
Dept: INTERNAL MEDICINE | Facility: CLINIC | Age: 69
End: 2024-06-21
Payer: MEDICARE

## 2024-06-21 DIAGNOSIS — Z79.4 TYPE 2 DIABETES MELLITUS WITH DIABETIC NEUROPATHY, WITH LONG-TERM CURRENT USE OF INSULIN: ICD-10-CM

## 2024-06-21 DIAGNOSIS — I89.0 LYMPHEDEMA OF BOTH LOWER EXTREMITIES: Primary | ICD-10-CM

## 2024-06-21 DIAGNOSIS — E11.40 TYPE 2 DIABETES MELLITUS WITH DIABETIC NEUROPATHY, WITH LONG-TERM CURRENT USE OF INSULIN: ICD-10-CM

## 2024-06-21 DIAGNOSIS — S81.809A WOUND OF LOWER EXTREMITY, UNSPECIFIED LATERALITY, INITIAL ENCOUNTER: ICD-10-CM

## 2024-06-21 NOTE — TELEPHONE ENCOUNTER
----- Message from Leon Barajas DO sent at 6/21/2024  2:05 PM CDT -----  Regarding: RE: Wound care orders request  May out in referral  ----- Message -----  From: Luisa Thompson PT  Sent: 6/20/2024   9:59 AM CDT  To: Leon Barajas DO  Subject: Wound care orders request                        Good Morning Dr. Barajas     I have seen Mr. Villar for lymph PT. Mr. Villar presents with skin worsening and while it is not emergent now, I think he would benefit from wound care to stop it from getting to the point of severe wounds/ infection given his medical history. I wanted to ask your thoughts on a wound care referral. Thank you for your time     Luisa Thompson, PT, DPT, CLT

## 2024-06-26 ENCOUNTER — OFFICE VISIT (OUTPATIENT)
Dept: PODIATRY | Facility: CLINIC | Age: 69
End: 2024-06-26
Payer: MEDICARE

## 2024-06-26 VITALS
HEIGHT: 67 IN | DIASTOLIC BLOOD PRESSURE: 71 MMHG | HEART RATE: 78 BPM | BODY MASS INDEX: 45.92 KG/M2 | SYSTOLIC BLOOD PRESSURE: 124 MMHG

## 2024-06-26 DIAGNOSIS — E11.9 ENCOUNTER FOR DIABETIC FOOT EXAM: ICD-10-CM

## 2024-06-26 DIAGNOSIS — I73.9 PVD (PERIPHERAL VASCULAR DISEASE): Primary | ICD-10-CM

## 2024-06-26 DIAGNOSIS — I89.0 LYMPHEDEMA: ICD-10-CM

## 2024-06-26 DIAGNOSIS — Z87.828 HEALED WOUND: ICD-10-CM

## 2024-06-26 DIAGNOSIS — B35.1 ONYCHOMYCOSIS DUE TO DERMATOPHYTE: ICD-10-CM

## 2024-06-26 PROCEDURE — 99999 PR PBB SHADOW E&M-EST. PATIENT-LVL III: CPT | Mod: PBBFAC,,, | Performed by: STUDENT IN AN ORGANIZED HEALTH CARE EDUCATION/TRAINING PROGRAM

## 2024-06-26 PROCEDURE — 3008F BODY MASS INDEX DOCD: CPT | Mod: CPTII,S$GLB,, | Performed by: STUDENT IN AN ORGANIZED HEALTH CARE EDUCATION/TRAINING PROGRAM

## 2024-06-26 PROCEDURE — 3066F NEPHROPATHY DOC TX: CPT | Mod: CPTII,S$GLB,, | Performed by: STUDENT IN AN ORGANIZED HEALTH CARE EDUCATION/TRAINING PROGRAM

## 2024-06-26 PROCEDURE — 3051F HG A1C>EQUAL 7.0%<8.0%: CPT | Mod: CPTII,S$GLB,, | Performed by: STUDENT IN AN ORGANIZED HEALTH CARE EDUCATION/TRAINING PROGRAM

## 2024-06-26 PROCEDURE — 1159F MED LIST DOCD IN RCRD: CPT | Mod: CPTII,S$GLB,, | Performed by: STUDENT IN AN ORGANIZED HEALTH CARE EDUCATION/TRAINING PROGRAM

## 2024-06-26 PROCEDURE — 3078F DIAST BP <80 MM HG: CPT | Mod: CPTII,S$GLB,, | Performed by: STUDENT IN AN ORGANIZED HEALTH CARE EDUCATION/TRAINING PROGRAM

## 2024-06-26 PROCEDURE — 11721 DEBRIDE NAIL 6 OR MORE: CPT | Mod: Q8,S$GLB,, | Performed by: STUDENT IN AN ORGANIZED HEALTH CARE EDUCATION/TRAINING PROGRAM

## 2024-06-26 PROCEDURE — 3074F SYST BP LT 130 MM HG: CPT | Mod: CPTII,S$GLB,, | Performed by: STUDENT IN AN ORGANIZED HEALTH CARE EDUCATION/TRAINING PROGRAM

## 2024-06-26 PROCEDURE — 99214 OFFICE O/P EST MOD 30 MIN: CPT | Mod: 25,S$GLB,, | Performed by: STUDENT IN AN ORGANIZED HEALTH CARE EDUCATION/TRAINING PROGRAM

## 2024-06-26 PROCEDURE — 3062F POS MACROALBUMINURIA REV: CPT | Mod: CPTII,S$GLB,, | Performed by: STUDENT IN AN ORGANIZED HEALTH CARE EDUCATION/TRAINING PROGRAM

## 2024-06-26 PROCEDURE — 1126F AMNT PAIN NOTED NONE PRSNT: CPT | Mod: CPTII,S$GLB,, | Performed by: STUDENT IN AN ORGANIZED HEALTH CARE EDUCATION/TRAINING PROGRAM

## 2024-06-26 NOTE — PROGRESS NOTES
Subjective:     Patient ID: Jian Arrieta is a 68 y.o. male.    Chief Complaint: Foot Problem, Foot Ulcer, Wound Care, Dressing Change, and Diabetes Mellitus    Jian is a 68 y.o. male who presents to the clinic upon referral from Dr. Monica vázquez. provider found  for evaluation and treatment of diabetic feet. Jian has a past medical history of Alcohol abuse, Anasarca (2019), Anemia, Anticoagulant long-term use, Arthropathy associated with neurological disorder (2015), Atherosclerosis, Charcot foot due to diabetes mellitus, Chronic combined systolic and diastolic heart failure (2019), Chronic pancreatitis (2019), CKD (chronic kidney disease) stage 3, GFR 30-59 ml/min, CKD (chronic kidney disease) stage 4, GFR 15-29 ml/min, Colon polyps, Coronary artery disease due to calcified coronary lesion (2015), Diabetic polyneuropathy associated with type 2 diabetes mellitus (2015), Diverticulosis (2019), DM type 2 with diabetic peripheral neuropathy (2019), Encounter for blood transfusion, Essential hypertension (2019), Former smoker (2015), Healed ulcer of left foot on examination (2017), Hematuria, Hydrocele, Hyperphosphatemia, Hypoalbuminemia (2019), Hypocalcemia, Lymphedema of both lower extremities (2019), Mixed hyperlipidemia (2015), Morbid obesity with BMI of 50.0-59.9, adult (2015), Obstruction of right ureteropelvic junction (UPJ) due to stone (2021), Onychomycosis of multiple toenails with type 2 diabetes mellitus and peripheral neuropathy (2017), Perianal cyst, Proteinuria, Pseudocyst of pancreas (2019), Skin cancer, Sleep apnea (2015), Status post bariatric surgery (2016), Type 2 diabetes mellitus, with long-term current use of insulin (2015), and Urinary tract infection. Patient relates no major problem with feet. Only complaints today consist of blister to bottom of his left foot. Relates both of his dogs  over the  past week and he has been on his feet a lot. No further pedal complaints. Has plan to travel to [facility] over weekend with his wife for their anniversary.    8/15/23: Seen today, relates he did not go to the [facility], he decided to stay home and elevate his feet. Relates his left leg was itching badly in previous dressing and has new wound to left anterior leg where he scratched. No further pedal complaints. Denies signs of infection. Relates starts in lymphedema clinic in 2 weeks.     9/12/23: Seen today, all wounds are healed. He continues to follow up in lymphedema clinic.     6/26/24: Seen today for annual diabetic foot exam. History of right foot wound which is healed on exam. No further pedal complaints    PCP: Leon Barajas,     Date Last Seen by PCP:     Current shoe gear: Casual shoes    Hemoglobin A1C   Date Value Ref Range Status   05/22/2024 7.2 (H) 4.0 - 5.6 % Final     Comment:     ADA Screening Guidelines:  5.7-6.4%  Consistent with prediabetes  >or=6.5%  Consistent with diabetes    High levels of fetal hemoglobin interfere with the HbA1C  assay. Heterozygous hemoglobin variants (HbS, HgC, etc)do  not significantly interfere with this assay.   However, presence of multiple variants may affect accuracy.     01/18/2024 6.7 (H) 4.0 - 5.6 % Final     Comment:     ADA Screening Guidelines:  5.7-6.4%  Consistent with prediabetes  >or=6.5%  Consistent with diabetes    High levels of fetal hemoglobin interfere with the HbA1C  assay. Heterozygous hemoglobin variants (HbS, HgC, etc)do  not significantly interfere with this assay.   However, presence of multiple variants may affect accuracy.     10/30/2023 7.7 (H) 4.0 - 5.6 % Final     Comment:     ADA Screening Guidelines:  5.7-6.4%  Consistent with prediabetes  >or=6.5%  Consistent with diabetes    High levels of fetal hemoglobin interfere with the HbA1C  assay. Heterozygous hemoglobin variants (HbS, HgC, etc)do  not significantly interfere with this  assay.   However, presence of multiple variants may affect accuracy.           Review of Systems   Constitutional: Negative for chills, decreased appetite, diaphoresis and fever.   HENT:  Negative for congestion and hearing loss.    Cardiovascular:  Negative for chest pain, claudication and syncope.   Respiratory:  Negative for cough and shortness of breath.    Skin:  Positive for color change, dry skin, nail changes and poor wound healing. Negative for flushing, itching and rash.   Musculoskeletal:  Negative for back pain.   Gastrointestinal:  Negative for nausea and vomiting.   Neurological:  Positive for numbness and paresthesias. Negative for focal weakness and weakness.   Psychiatric/Behavioral:  Negative for altered mental status. The patient is not nervous/anxious.         Objective:     Physical Exam  Constitutional:       General: He is not in acute distress.     Appearance: Normal appearance. He is well-developed. He is not diaphoretic.   Cardiovascular:      Comments: Dorsalis pedis and posterior tibial pulses are within normal limits. Skin temperature is within normal limits. Toes are cool to touch and feet are warm proximally. Hair growth is diminished. Skin is mildly atrophic and with mild hyperpigmentation.lymphedema noted, bilaterally.   Musculoskeletal:         General: No tenderness.      Comments: Adequate joint range of motion without pain, limitation, nor crepitation to foot and ankle joints. Muscle strength is 5/5 in all groups bilaterally.       Feet:      Right foot:      Skin integrity: Dry skin present. No ulcer, blister, erythema or warmth.      Left foot:      Skin integrity: Dry skin present. No ulcer, blister, erythema or warmth.   Skin:     General: Skin is warm and dry.      Capillary Refill: Capillary refill takes less than 2 seconds.      Comments: Skin is warm and dry, no acute signs of infection noted bilaterally      Toenails are well trimmed and of normal morphology    Previous  wounds are healed.     Otherwise, no open wounds, macerations or hyperkeratotic lesions, bilaterally            Neurological:      Mental Status: He is alert and oriented to person, place, and time.      Sensory: Sensory deficit present.      Motor: No abnormal muscle tone.      Comments: Light touch within normal limits. Benham-Aurelio 5.07 monofilamant testing is diminished. Vibratory sensation is diminished bilaterally.   Psychiatric:         Mood and Affect: Mood normal.         Behavior: Behavior normal.         Thought Content: Thought content normal.           Assessment:      Encounter Diagnoses   Name Primary?    PVD (peripheral vascular disease) Yes    Lymphedema     Healed wound     Encounter for diabetic foot exam     Onychomycosis due to dermatophyte          Plan:     Jian was seen today for foot problem, foot ulcer, wound care, dressing change and diabetes mellitus.    Diagnoses and all orders for this visit:    PVD (peripheral vascular disease)  -     Routine Foot Care    Lymphedema    Healed wound    Encounter for diabetic foot exam  -     Foot Exam Performed    Onychomycosis due to dermatophyte  -     Routine Foot Care          I counseled the patient on his conditions, their implications and medical management.  Routine foot care per  note  Previous ulcers are healed  Continue lymphedema management per PT, follow up with PCP for further management  Rest, elevate and avoid salt intake. Continue compression socks at home  Shoe inspection. Diabetic Foot Education. Patient reminded of the importance of good nutrition and blood sugar control to help prevent podiatric complications of diabetes. I discussed with the  patient  signs and symptoms of infection including redness, drainage, purulence, odor, pain, elevated BS, streaking, fever, chills, etc . Patient is to seek medical attention (ER or urgent care) if these symptoms occur    Return to clinic in 6-12 months, sooner PRN

## 2024-06-26 NOTE — PROCEDURES
"Routine Foot Care    Date/Time: 6/26/2024 1:45 PM    Performed by: Savanah Felton DPM  Authorized by: Savanah Felton DPM    Time out: Immediately prior to procedure a "time out" was called to verify the correct patient, procedure, equipment, support staff and site/side marked as required.    Consent Done?:  Yes (Verbal)  Hyperkeratotic Skin Lesions?: No      Nail Care Type:  Debride  Location(s): All  (Left 1st Toe, Left 3rd Toe, Left 2nd Toe, Left 4th Toe, Left 5th Toe, Right 1st Toe, Right 2nd Toe, Right 3rd Toe, Right 4th Toe and Right 5th Toe)  Patient tolerance:  Patient tolerated the procedure well with no immediate complications    "

## 2024-07-02 ENCOUNTER — CLINICAL SUPPORT (OUTPATIENT)
Dept: DIABETES | Facility: CLINIC | Age: 69
End: 2024-07-02
Payer: MEDICARE

## 2024-07-02 DIAGNOSIS — E13.9 LADA (LATENT AUTOIMMUNE DIABETES IN ADULTS), MANAGED AS TYPE 1: Primary | ICD-10-CM

## 2024-07-02 NOTE — PROGRESS NOTES
Diabetes Care Specialist Progress Note  Author: Lori Rush RD, CDE  Date: 7/2/2024    Program Intake  Reason for Diabetes Program Visit:: Post Program Follow-Up  Type of Follow-Up: Other (15 month)    Lab Results   Component Value Date    HGBA1C 7.2 (H) 05/22/2024     Patient came in to have a replacement Omnipod 5  set up - old one damaged. The following settings were programmed into pump:  12am - 1.1 units/hour   7am - 1.2 units/hour   Carb ratio set at 1:1 - patient enters 6-8 units for meal time  BG target/correction set at 110  ISF set at 40   IOB 3 hours    Podder username: [email or web address]  Podbelinda password: [email or web address]     Lou username: [email or web address]  [email or web address] password: [email or web address]    Overall, patient is doing well. Last A1c 7.2. Reviewed backup insulin plan when not on pump. Instructions were printed out. Advised when he runs out of Levemir, he will need to get a new Rx for Lantus or other long acting. He will follow with PCP for now. Will plan to schedule with new ENDO provider when available. No further questions or concerns at this time.      Assessment Summary and Plan    Based on today's diabetes care assessment, the following areas of need were identified:          3/29/2023    12:01 AM   Social   Support No   Access to Mass Media/Tech No   Cognitive/Behavioral Health No   Culture/Confucianist No   Communication No   Health Literacy No            3/29/2023    12:01 AM   Clinical   Medication Adherence No            3/29/2023    12:01 AM   Diabetes Self-Management Skills   Diabetes Disease Process/Treatment Options No   Nutrition/Healthy Eating No   Physical Activity/Exercise No   Medication Yes   Home Blood Glucose Monitoring No   Acute Complications No   Chronic Complications No   Psychosocial/Coping No          Today's interventions were provided through individual discussion, instruction, and written materials were provided.      Patient verbalized understanding of instruction and written  materials.  Pt was able to return back demonstration of instructions today. Patient understood key points, needs reinforcement and further instruction.     Diabetes Self-Management Care Plan:    Today's Diabetes Self-Management Care Plan was developed with Jian's input. Jian has agreed to work toward the following goal(s) to improve his/her overall diabetes control.      There are no recently modified care plans to display for this patient.      Follow Up Plan     Follow up in about 1 year (around 7/2/2025).    Today's care plan and follow up schedule was discussed with patient.  Jian verbalized understanding of the care plan, goals, and agrees to follow up plan.        The patient was encouraged to communicate with his/her health care provider/physician and care team regarding his/her condition(s) and treatment.  I provided the patient with my contact information today and encouraged to contact me via phone or [facility]'s Patient Portal as needed.     Length of Visit   Total Time: 30 Minutes

## 2024-07-03 DIAGNOSIS — E13.9 LADA (LATENT AUTOIMMUNE DIABETES IN ADULTS), MANAGED AS TYPE 1: ICD-10-CM

## 2024-07-03 RX ORDER — INSULIN PMP CART,AUT,G6/7,CNTR
EACH SUBCUTANEOUS
Qty: 15 EACH | Refills: 11 | Status: SHIPPED | OUTPATIENT
Start: 2024-07-03

## 2024-07-03 NOTE — PATIENT INSTRUCTIONS
LYMPHEDEMA    What is Lymphedema  Swelling in an arm, leg, the neck, the face, genitals and/or abdomen caused by a blockage in the lymphatic system. This type of swelling can decrease the amount of oxygen that reaches tissues and can also promote infection by serving as a medium in which bacteria may grow. An example of an infection seen commonly in those with Lymphedema is Cellulitis.  It is important to know that lymphedema is progressive and can lead to long-term, chronic conditions. Therefore, early and careful management is needed to reduce symptoms.    The Lymphatic System  A network of tissues and organs that help rid the body of toxins, waste and other unwanted materials. The lymphatic system returns fluid and protein to the circulatory system from the spaces in the body's tissues. Tiny lymph vessels of the lymphatic system  materials and waste products, along with foreign materials (bacteria) and transport them to larger lymph vessels. This fluid inside the vessels is called lymph. Surrounding muscles help move the lymph along to the larger vessels until it is returned to the circulatory system. Lymph nodes help filter the lymph fluid and break down foreign substances and help fight infection.                                                          What Causes Lymphedema?  Some people are born with an underdeveloped lymph system. Swelling may present at birth or develop during adolescence or adulthood. This is known as primary lymphedema. Secondary lymphedema may occur after lymph tissue and lymph nodes are injured or removed. This swelling may begin immediately or may not occur for several months or years. A common cause of secondary lymphedema is radiation treatment. Other causes of secondary lymphedema are surgery (removal of lymph nodes), cancer, infection and trauma. While there is presently no cure for lymphedema, it can be managed with diligent care. The earlier the stage, the easier the  management of the affected area.    Stages of Lymphedema:  Stage 0 (pre-stage): A subclinical state where swelling is not obvious. You may have complaints such as heaviness in the limb or mild aching or tightness  Stage 1: Skin of the limb is typically soft with pitting edema. Elevation of the limb may improve the swelling  Stage 2: Skin of the limb is more fibrotic. The skin may no longer pit when pressed. Limb swelling does not improve with elevation.   Stage 3: Severe stage also known as elephantiasis. Skin is fibrotic with deep skin creases which are prone to infection. Trophic skin changes, such as papillomatosis and hyperkeratosis also occur in this stage.                                                                             Precautions:  It is important to prevent skin irritation, cuts, scrapes and needle sticks in the skin to decrease risk of infection  Wear gloves for gardening and housework  Use an electric razor rather than a blade razor  Have injections and blood draws from the uninvolved side  Use a thimble when sewing  Avoid rubbing, scrubbing, waxing of involved limb  Take care to avoid bites and scratches from bugs and pets  Avoid walking barefoot  Use extreme caution when peeling shrimp, crawfish, crabs      It is important to avoid extreme heat exposure. When you increase circulation to a swollen limb, the chance of additional fluid leaking into the spaces of your body's tissue increases, which causes more swelling.  Avoid prolonged exposure to sunlight  Use sunscreen when outdoors  Use oven mitts when reaching into an oven  Avoid hot tubs and saunas  Avoid spending prolonged time under a hot hair dryer  Look for clothing made of breathable fabrics for hot weather      It is important to avoid anything that will reduce circulation of blood flow, since reduction of blood flow can lead to a restriction of lymph flow.  Have blood pressure measured in the uninvolved limb  Avoid tight or  constricting clothing such as tight sleeves and waistbands  Avoid tight watches and jewelry  Avoid sitting with your legs crossed        In general, practice healthy living habits:  Avoid alcohol and smoking  Maintain your ideal weight  Keep sodium intake at a moderate level (less than 2,400 mg/day)  Eat moderate amount of protein to maintain tissue health. Contact your primary doctor for a referral to [facility]  When you travel by air, wear your compression garment during the flight  Wash hands frequently and dry thoroughly  Keep skin moisturized and free from cracking or peeling. For sensitive skin, Eucerin lotion is a good option.   Avoid hangnails and do not pull or bite cuticles  Take care of all scratches, cuts, bites, immediately with an antibiotic cream or ointment (Neosporin, Bacitracin, Triple ABX) and cover with a clean bandage.       Call your doctor as soon as you suspect infection. Be aware of the signs of infection:  Increased swelling  Redness (generalized or localized small, red dots)  Heat (arm/leg feels warm, or you have fever)  Pain       MANAGING YOUR BANDAGES:  Although your bandages are inconvenient, they are an important part of the lymphedema program. You will receive one set of bandages for participating in [facility]. It is important to take care of these bandages during your treatment. You should not get your bandages wet! For bandages on the arm, it is possible to protect the arm bandages with a plastic bag when in a tub. No Showers with bandages on arm or leg!          WHAT TO EXPECT DURING LYMPHEDEMA TREATMENT:  Wear loose short sleeves for arm treatment. Sleeveless tops work well too. Wear skirts or baggy shorts or loose fitting/baggy pants for leg treatment.  If your leg(s) are being bandaged, find the widest shoe(s) possible. Wide slip-on shoes like crocks usually work. If you do not have a shoe that will fit over the bandages, a  disposable shoe cover will be provided to you.  Treatment sessions will last 45 minutes to 60 minutes and will consist of Manual Lymphatic Drainage (MLD), MLD training, vasopneumatic compression of the extremity as needed and bandaging of the swollen extremity.   You may be asked to remove clothes so that treatments can be performed efficiently. You will be provided with a sheet to drape yourself.   Once the compression bandage is applied, you will be expected to wear the bandage until the next visit. The bandage will loosen as the lymph fluid is pushed out of the limb. Do not attempt to unwrap and re-wrap the limb unless you have been trained to do so. In the event that the bandages are so loose that they fall off, remove them and put everything in a bag and bring it to the next therapy session.  While wearing the bandages on the arm or leg, keep fingers and toes moving, bend your elbow/knee, wrist/ankle frequently. Elevate the limb above the heart to relieve any throbbing or discomfort.  If the discomfort is unbearable, you may try to remove the outermost bandage. If this does not work, remove all bandages and bring them with you to the next therapy session.   Depending on severity of the swelling, expect weekly treatment for 2-5 days per week x 4-6 weeks.  An exercise program will be created based on individual need.  You will be fitted for a permanent compression garment (sleeve for arm/stocking for leg) prior to discharge from therapy. This permanent compression garment will take the place of the bandages and need to be worn as prescribed by your therapist.    CARE OF YOUR COMPRESSION GARMENT  Wear the compression garment daily as prescribed by your therapist. You will not wear the compression garment when sleeping. Put on the garment soon after you wake up and remove it before going to bed. This will decrease the risk of the lymph fluid returning to the extremity.   Use donning gloves/garden gloves to  the  compression garment. This will decrease tearing the material and make it easier to  the material.  If you feel the garment is too tight, notify your therapist.  It is best to have a wear one, wash one garment if possible. Check with your insurance provided to see what they will cover for lymphedema supplies.  Compression garments can be expected to last 3-6 months before they need to be replaced. When the compression garment loses its compressive ability, swelling from lymph fluid can return in the limb even if you are wearing the garment daily.   Consult your therapist or compression representative in the event you need to be re-measured.     Exercise:    Your therapist will instruct you in an exercise program tailored to you. Muscle contractions help promote the flow of lymph out of the swollen limb.  To prevent an increase in swelling of the limb, it is always best practice to wear your compression garment on the affected extremity when exercising.    **IF YOUR ARM IS BANDAGED, OPEN/CLOSE YOUR HAND AND PERFORM WRIST CIRCLES THROUGHOUT THE DAY.    Perform all exercises below with good, erect posture. Stand with feet shoulder width apart, with your knees slightly bent. Repeat each exercise 10-20 reps up to 3x/week               Shoulder Blade Squeeze      Backwards Shoulder Roll  Neck Rotation    Neck Side Bends    Chest Press with Stick    Elbow Bends with Stick      Ankle Pumps (and wiggle toes; do frequently)    Hamstring Curls    Heel Raises  Knee Extension    Knee Bends    Lunges  Knee Raises    Trunk Rotation    The following are a list of resources that may be helpful for you.  Lymphnotes.com: online information source for those having or are at risk of developing Lymphedema. This site is also for family and friend's who care for those with lymphedema  NLN (National Lymphedema Network): an organization dedicated to promoting the awareness of lymphedema and empowering people with lymphedema to live life  to the fullest. Lymphnet.org  LEARN (Lymphedema Education and Research Network): answers to frequently asked questions about Lymphedema, Lipedema, and the lymphatic system. Lymphaticnetwork.org  How Isabelle Got her Rack Back, A Breast Cancer Memoir by Mary Dahl - a book that gives a laugh out loud humor to her adventure, along with poignant moments of self-discovery as she blogs her way to good health. (available on [employer] and paperback)  100 Questions and Answers About Lymphedema by Lauryn Pool, Stepahnie Joiner, and Deandra Moscoso - provides clear, straightforward answers to your questions about lymphedema. (available on paperback)  Podcasts: Lymphedema Podcast, Livedema Podcast, Lymph Logic to name a few      If you need further assistance or have questions concerning your treatment, please do not hesitate to call Luisa Thompson (therapist) at  [phone] (phone number).

## 2024-07-03 NOTE — TELEPHONE ENCOUNTER
Refill Routing Note   Medication(s) are not appropriate for processing by [facility] for the following reason(s):        New or recently adjusted medication    [facility] action(s):  Defer               Appointments  past 12m or future 3m with PCP    Date Provider   Last Visit   5/29/2024 Leon Barajas, DO   Next Visit   8/29/2024 Leon Barajas, DO   ED visits in past 90 days: 0        Note composed:4:09 AM 07/03/2024

## 2024-07-03 NOTE — TELEPHONE ENCOUNTER
No care due was identified.  [facility] Embedded Care Due Messages. Reference number: [id number].   7/03/2024 12:36:28 AM CDT

## 2024-07-03 NOTE — PLAN OF CARE
[facility]  Physical Therapy Initial Evaluation    Name: Jian JASMINE Meenakshi  [facility] Number: [id number]    Therapy Diagnosis:   Encounter Diagnoses   Name Primary?    Lymphedema of both lower extremities     Venous stasis dermatitis of both lower extremities Yes    Venous insufficiency of both lower extremities      Physician: Leon Barajas, *    Physician Orders: PT Eval and Treat - lymphedema  Medical Diagnosis from Referral: I89.0 (ICD-10-CM) - Lymphedema, not elsewhere classified L03.119 (ICD-10-CM) - Cellulitis of unspecified part of limb  Evaluation Date: 6/20/2024  Authorization Period Expiration: 12/31/2024  Plan of Care Expiration: 8/20/2024  Visit # / Visits authorized: 1/ 1    Time In: 9:00am  Time Out: 9:50am  Total Billable Time: 50 minutes    Precautions: Standard, Diabetes, and CKD, cellulitis    Subjective   Date of onset: 3 months ago   History of current condition - Jian reports: He started having blisters under the skin which ending up bursting.     Pt denies  KF, DM and CA.   Fluid pill: Yes  Blood thinner: No     Medical History:   Past Medical History:   Diagnosis Date    Alcohol abuse     Anasarca 1/28/2019    Anemia     Anticoagulant long-term use     Arthropathy associated with neurological disorder 9/2/2015    Atherosclerosis     Charcot foot due to diabetes mellitus     Chronic combined systolic and diastolic heart failure 01/29/2019 1-28-19 Left VentricleModerate decreased ejection fraction at 30%. Normal left ventricular cavity size. Normal wall thickness observed. Grade I (mild) left ventricular diastolic dysfunction consistent with impaired relaxation. Normal left atrial pressure. Moderate, global hypokinesis(see wall scoring diagram). Right VentricleNormal cavity size, wall thickness and ejection fraction. Wall motion n    Chronic pancreatitis 1/28/2019    CKD (chronic kidney disease) stage 3, GFR 30-59 ml/min     CKD (chronic kidney disease) stage 4,  GFR 15-29 ml/min     Colon polyps     approx 5 yrs ago    Coronary artery disease due to calcified coronary lesion 05/08/2015    5 stents on ASA      Diabetic polyneuropathy associated with type 2 diabetes mellitus 9/2/2015    Diverticulosis 1/28/2019    DM type 2 with diabetic peripheral neuropathy 2/4/2019    Encounter for blood transfusion     Essential hypertension 1/28/2019    Former smoker 8/26/2015    Healed ulcer of left foot on examination 6/20/2017    Hematuria     Hydrocele     approx 1.5 yrs ago    Hyperphosphatemia     Hypoalbuminemia 2/4/2019    Hypocalcemia     Lymphedema of both lower extremities 1/29/2019    Mixed hyperlipidemia 5/8/2015    Morbid obesity with BMI of 50.0-59.9, adult 5/8/2015    Obstruction of right ureteropelvic junction (UPJ) due to stone 5/24/2021    Onychomycosis of multiple toenails with type 2 diabetes mellitus and peripheral neuropathy 6/20/2017    Perianal cyst     approx 2 yrs ago    Proteinuria     Pseudocyst of pancreas 1/28/2019 1-28-19 Liver has a cirrhotic morphology with no focal lesions.  Significant interval increase in ascites when compared to prior exam which may account for patient's abdominal distension.  Hypodense air-fluid collection along the body of the pancreas which is slightly smaller when compared to prior CT.  Findings may relate to pancreatic necrosis with pancreatic pseudocysts with infected pseudocyst    Skin cancer     skin cancer    Sleep apnea 8/26/2015    Status post bariatric surgery 1/11/2016    Type 2 diabetes mellitus, with long-term current use of insulin 5/8/2015    Urinary tract infection        Surgical History:   Jian Arrieta  has a past surgical history that includes Angioplasty; Cyst Removal; perianal surgery; Colonoscopy (N/A, 10/6/2015); Knee arthroscopy; Gastrectomy; Coronary artery bypass graft (2017); Coronary angiography (Right, 3/20/2019); Coronary bypass graft angiography (3/20/2019); Insertion of dialysis catheter (N/A,  5/17/2019); Angiography (N/A, 6/28/2019); Esophagogastroduodenoscopy (N/A, 7/8/2019); Endoscopic ultrasound of upper gastrointestinal tract (N/A, 2/26/2020); Laparoscopic cholecystectomy (N/A, 5/4/2020); Liver biopsy (N/A, 5/4/2020); Esophagogastroduodenoscopy (N/A, 5/13/2021); Colonoscopy (N/A, 5/13/2021); Excision of hydrocele (Bilateral, 6/16/2021); Cystoscopy w/ ureteral stent placement (Right, 6/16/2021); Fluoroscopy (N/A, 6/16/2021); Ureteroscopy (Right, 6/30/2021); Laser lithotripsy (N/A, 6/30/2021); Pyeloscopy (Right, 6/30/2021); Cystoscopy (Right, 6/30/2021); Replacement of stent (Right, 6/30/2021); Cystoscopy (N/A, 10/16/2023); Retrograde pyelography (Right, 10/16/2023); Ureteral stent placement (Right, 10/16/2023); Ureteroscopy (Right, 10/16/2023); Pyeloscopy (Right, 10/16/2023); Cystoscopy (N/A, 12/6/2023); Ureteroscopy (Right, 12/6/2023); removal-stent (Right, 12/6/2023); Retrograde pyelography (Right, 12/6/2023); extraction - stone (Right, 12/6/2023); and Pyeloscopy (Right, 12/6/2023).    Medications:   Jian has a current medication list which includes the following prescription(s): acetaminophen, amlodipine, aspirin, blood pressure monitor, blood sugar diagnostic, dexcom g6 sensor, dexcom g6 transmitter, baqsimi, humalog kwikpen insulin, hydralazine, insulin detemir u-100 (levemir), lyumjev u-100 insulin, omnipod 5 g6 pods (gen 5), lancets, patiromer calcium sorbitex, pen needle, diabetic, rosuvastatin, and torsemide.    Allergies:   Review of patient's allergies indicates:  No Known Allergies     Imaging, none recent     Surgery: see above   Radiation:  none   Chemotherapy: none    Previous Lymphedema Treatment: yes   Prior Therapy: yes   Social History: lives with wife     Abuse/Neglect: Pt shows no visible signs of abuse/neglect   Nutritional status: Pt reports no nutritional needs    Educational needs: Pt reports no educational needs    Spiritual/Cultural: Pt reports no spiritual/ cultural needs      Fall risk: no falls recently    Occupation: retired   Prior Level of Function: independent   Current Level of Function: harder to walk, harder with finding   Gait: pt uses a SPC      Pain and Swelling:  Current 0/10, worst 0/10, best 0/10       Pts goals: reduce swelling    Objective       Amount of Swelling/Location of Swelling: Significant swelling of BLEs    Skin Integrity: small blisters/ openings on B lower legs    Palpation/Texture: pitting edema BLEs    + B Stemmer Sign  - B Osmel's Sign  Circulation: intact      Posture: FHP      Strength: functional screen of AROM against gravity and sit to stand transfers       Sensation:  intact to lt touch B LE    Girth Measurements (in centimeters)                      CMS Impairment/Limitation/Restriction for FOTO LE edema Survey  Limitation: 59%    Therapist reviewed FOTO scores for Jian Arrieta on 6/20/2024.   FOTO documents entered into [employer] - see Media section.       TREATMENT     Total Treatment time separate from Evaluation: 30 minutes      Self Care/Home Management training for 30 minutes including:    Pt was educated in potential compression needs.  Demo of products including socks, garments, and Inelastic Velcro wraps.   Discussed cost/coverage and authorization per insurance with Durable Medical Equipment(DME) provider.  Compression require orders from referring provider and coverage or purchase of products from DME or self order.      Discussed wear schedule, don/doff, wash and management of products.  Size and compression class and AM/PM needs.      Informed insurance coverage of compression is per DME provider and typically Medicare and Medicare group plans may not cover cost beyond pair of standard sized knee high garments.   Commitment to attendance as well as commitment to securing compression needs is critical to edema management.      Issued tubigrip size G for BLEs     Home Exercises and Patient Education Provided  Education provided:   -  lymph booklet   - Pt was educated in lymphedema etiology and management plans.  Pt was provided with written risk reductions and precautions for managing lymphedema.      This patient is in agreement to participate in Lymphedema treatment.    Written Home Exercises Provided: yes.  Exercises were reviewed and Jian was able to demonstrate them prior to the end of the session.  Jian demonstrated good  understanding of the education provided.     See EMR under Patient Instructions for exercises provided 6/20/2024.    Assessment   Jian is a 68 y.o. male referred to outpatient Physical Therapy with a medical diagnosis of I89.0 (ICD-10-CM) - Lymphedema, not elsewhere classified L03.119 (ICD-10-CM) - Cellulitis of unspecified part of limb. This patient presents s/p Charcot foot, DM, CKD, Hx of cellulitis    resulting in: lymphedema of the BLEs, increased pain, as well as difficulty performing walking  , compression needs, and placing the pt at higher risk of infection.     Patient presents with significant swelling of the B lower legs with skin irritation including a plantar foot wound. Pt's history of cellulitis indicates pt would benefit from addressing skin concerns before starting lymph PT. Pt's MD was contacted about possible referral for skin concerns. In the mean time pt was issued temporary compression to wear daily. Pt would benefit from therapy to decrease swelling, aid in finding compression, are preparing pt for home management.       Pt prognosis is Fair.   Pt will benefit from skilled outpatient Physical Therapy to address the deficits stated above and in the chart below, provide pt/family education, and to maximize pt's level of independence.     Plan of care discussed with patient: Yes  Pt's spiritual, cultural and educational needs considered and patient is agreeable to the plan of care and goals as stated below:     Medical Necessity is demonstrated by the following  History  Co-morbidities and personal  factors that may impact the plan of care [] LOW: no personal factors / co-morbidities  [] MODERATE: 1-2 personal factors / co-morbidities  [x] HIGH: 3+ personal factors / co-morbidities    Moderate / High Support Documentation: HTN, CKD, Charcot foot, DM, hx of cellulitis      Examination  Body Structures and Functions, activity limitations and participation restrictions that may impact the plan of care [] LOW: addressing 1-2 elements  [] MODERATE: 3+ elements  [x] HIGH: 4+ elements (please support below)    Moderate / High Support Documentation: leg swelling, skin integrity, skin color, difficulty walking      Clinical Presentation [] LOW: stable  [] MODERATE: Evolving  [x] HIGH: Unstable     Decision Making/ Complexity Score: high       Anticipated Barriers for therapy: multiple comorbidites     The following goals were discussed with the patient and patient is in agreement with them as to be addressed in the treatment plan.     Short Term Goals: (6 weeks)  1. Patient will show decreased girth in B LE by up to 1 cm to allow for LE symmetry, shoe and clothing choice, and ability to apply needed compression.  (progressing, not met)   2. Patient will demonstrate 100% knowledge of lymphedema precautions and signs of infection to allow for reduced lymphedema risk, infection risk, and/or exacerbation of condition.  (progressing, not met)  3. Patient or caregiver will perform self-bandaging techniques and/or wearing of compression garments to allow for lymphatic drainage support, skin elasticity, and reduction in shape and size of limb. (progressing, not met)  4. Patient will perform self lymph drainage techniques to areas within reach to enhance lymphatic drainage and skin condition.  (progressing, not met)  5. Patient will tolerate daily activities with multilayered bandaging to allow for lymphatic and venous support.  (progressing, not met)    Long Term Goals: (12  weeks)  1. Patient will show decreased girth in b LE  by up to 2 cm  to allow for LE symmetry, shoe and clothing choice, and ability to apply needed compression daily.  (progressing, not met)  2. Patient will show reduction in density to mild or less with improved contour of limb to allow for cosmesis, LE symmetry, infection risk reduction, and clothing and compression choice.   (progressing, not met)  3. Patient to krystla/doff compression garment with daily compliance to assist in lymphedema management, skin elasticity, and tissue density.  (progressing, not met)  4. Pt to show improved postural awareness and alignment.  (progressing, not met)  5. Pt to be I and compliant with HEP to allow for increased function in affected limb.   (progressing, not met)  Plan   Plan of care Certification: 6/20/2024 to 8/20/2024.    Outpatient Physical Therapy 2 times weekly for 8 weeks to include the following interventions: Gait Training, Manual Therapy, Neuromuscular Re-ed, Patient Education, Self Care, Therapeutic Activities, and Therapeutic Exercise. Complete Decongestive Therapy- compression and home equipment needs to be addressed and assisted.    Pt may be seen by a PTA as part of the Rehab treatment team.  Plan of Care was discussed with Raeann Rodriguez, PTA    Luisa Thompson, PT

## 2024-07-08 ENCOUNTER — CLINICAL SUPPORT (OUTPATIENT)
Dept: REHABILITATION | Facility: HOSPITAL | Age: 69
End: 2024-07-08
Payer: MEDICARE

## 2024-07-08 DIAGNOSIS — I87.2 VENOUS STASIS DERMATITIS OF BOTH LOWER EXTREMITIES: ICD-10-CM

## 2024-07-08 DIAGNOSIS — I89.0 LYMPHEDEMA OF BOTH LOWER EXTREMITIES: Primary | Chronic | ICD-10-CM

## 2024-07-08 PROCEDURE — 29581 APPL MULTLAYER CMPRN SYS LEG: CPT | Mod: KX

## 2024-07-08 PROCEDURE — 97140 MANUAL THERAPY 1/> REGIONS: CPT | Mod: KX

## 2024-07-08 NOTE — PROGRESS NOTES
Physical Therapy Daily Treatment Note     Name: Jian JASMINE [facility] Number: [id number]    Therapy Diagnosis:   Encounter Diagnoses   Name Primary?    Lymphedema of both lower extremities Yes    Venous stasis dermatitis of both lower extremities      Physician: Savanah Felton DPM    Visit Date: 7/8/2024    Physician Orders: PT Eval and Treat - lymphedema  Medical Diagnosis from Referral: I89.0 (ICD-10-CM) - Lymphedema, not elsewhere classified L03.119 (ICD-10-CM) - Cellulitis of unspecified part of limb  Evaluation Date: 6/20/2024  Authorization Period Expiration: 12/31/2024  Plan of Care Expiration: 8/20/2024  Visit # / Visits authorized: 6/ 20     Time In: 9:07am  Time Out: 10:00am  Total Billable Time: 53 minutes     Precautions: Standard, Diabetes, and CKD, cellulitis       Subjective     Pt reports: His skin is a lot better .  He was compliant with home exercise program.  Response to previous treatment: ok  Functional change: wears his tubigrip most days when he works in the yard     Pain: 0/10  Location: bilateral lower legs     Objective     7/8/2024:     Pt presents with improved skin integrity, no open wounds today on BLEs     Treatment:   Jian received the following manual therapy techniques:- Manual Lymphatic Drainage were applied to the: RLE for  minutes, including: MLD and short stretch compression bandaging           MULTILAYERED BANDAGING:  issued supplies and bandaged R LE with cotton stockinette, komprex section dorsum of foot, 2 komprex wedges post malleoli, 2 komprex rolls ankle to knee, 1-8cm and 2- 10cm Durelast rolls foot to knee, to leave intact 12-24 hrs as tolerated, discontinue with any problems, return rolled bandages next session. Wash and wear schedules confirmed.       Home Exercises Provided and Patient Education Provided     Education provided:    Remove bandages if painful   PATIENT/FAMILY Education: bandaging wear schedule,  HEP,  Beginning of self massage,  Self or assisted  bandaging, compression options, and Risk reduction    Written Home Exercises Provided: Patient instructed to cont prior HEP.  Exercises were reviewed and Jian was able to demonstrate them prior to the end of the session.  Jian demonstrated good  understanding of the education provided.       Assessment     Patient presents with improved skin integrity but significant swelling of the BLEs with chronic redness and hyperkeratosis. Patient was educated extensively on importance of daily compression and pt is in agreement to improve compliance to continue therapy       Jian Is progressing well towards his goals.   Pt prognosis is Good.     Pt will continue to benefit from skilled outpatient physical therapy to address the deficits listed in the problem list box on initial evaluation, provide pt/family education and to maximize pt's level of independence in the home and community environment.     Pt's spiritual, cultural and educational needs considered and pt agreeable to plan of care and goals.     Anticipated barriers to physical therapy: none     Goals:     Short Term Goals: (6 weeks)  1. Patient will show decreased girth in B LE by up to 1 cm to allow for LE symmetry, shoe and clothing choice, and ability to apply needed compression.  (progressing, not met)   2. Patient will demonstrate 100% knowledge of lymphedema precautions and signs of infection to allow for reduced lymphedema risk, infection risk, and/or exacerbation of condition.  (progressing, not met)  3. Patient or caregiver will perform self-bandaging techniques and/or wearing of compression garments to allow for lymphatic drainage support, skin elasticity, and reduction in shape and size of limb. (progressing, not met)  4. Patient will perform self lymph drainage techniques to areas within reach to enhance lymphatic drainage and skin condition.  (progressing, not met)  5. Patient will tolerate daily activities with multilayered bandaging to allow for  lymphatic and venous support.  (progressing, not met)     Long Term Goals: (12  weeks)  1. Patient will show decreased girth in b LE by up to 2 cm  to allow for LE symmetry, shoe and clothing choice, and ability to apply needed compression daily.  (progressing, not met)  2. Patient will show reduction in density to mild or less with improved contour of limb to allow for cosmesis, LE symmetry, infection risk reduction, and clothing and compression choice.   (progressing, not met)  3. Patient to krystal/doff compression garment with daily compliance to assist in lymphedema management, skin elasticity, and tissue density.  (progressing, not met)  4. Pt to show improved postural awareness and alignment.  (progressing, not met)  5. Pt to be I and compliant with HEP to allow for increased function in affected limb.   (progressing, not met)    Plan   Continue PT  2x   weekly for Complete Decongestive Therapy:  Manual lymphatic drainage, Multilayered short stretch bandaging, Pneumatic compression, Therapeutic exercises, Patient education as deemed necessary to achieve stated goals.      Luisa Thompson, PT

## 2024-07-10 ENCOUNTER — CLINICAL SUPPORT (OUTPATIENT)
Dept: REHABILITATION | Facility: HOSPITAL | Age: 69
End: 2024-07-10
Payer: MEDICARE

## 2024-07-10 DIAGNOSIS — I87.2 VENOUS STASIS DERMATITIS OF BOTH LOWER EXTREMITIES: ICD-10-CM

## 2024-07-10 DIAGNOSIS — I89.0 LYMPHEDEMA OF BOTH LOWER EXTREMITIES: Primary | Chronic | ICD-10-CM

## 2024-07-10 PROCEDURE — 97140 MANUAL THERAPY 1/> REGIONS: CPT | Mod: KX

## 2024-07-17 ENCOUNTER — PATIENT MESSAGE (OUTPATIENT)
Dept: REHABILITATION | Facility: HOSPITAL | Age: 69
End: 2024-07-17
Payer: MEDICARE

## 2024-07-17 ENCOUNTER — DOCUMENTATION ONLY (OUTPATIENT)
Dept: REHABILITATION | Facility: HOSPITAL | Age: 69
End: 2024-07-17
Payer: MEDICARE

## 2024-07-17 NOTE — PROGRESS NOTES
7/17/2024  10:11am    Pt called responding to message. Pt reports he is at [facility] because he had a heart attack, needed to cancel his appointment today. Will update as able     Luisa Thompson, PT, DPT, CLT

## 2024-07-17 NOTE — PROGRESS NOTES
Physical Therapy Daily Treatment Note     Name: Jian JASMINE [facility] Number: [id number]    Therapy Diagnosis:   Encounter Diagnoses   Name Primary?    Lymphedema of both lower extremities Yes    Venous stasis dermatitis of both lower extremities      Physician: Saavnah Felton DPM    Visit Date: 7/10/2024    Physician Orders: PT Eval and Treat - lymphedema  Medical Diagnosis from Referral: I89.0 (ICD-10-CM) - Lymphedema, not elsewhere classified L03.119 (ICD-10-CM) - Cellulitis of unspecified part of limb  Evaluation Date: 6/20/2024  Authorization Period Expiration: 12/31/2024  Plan of Care Expiration: 8/20/2024  Visit # / Visits authorized: 7/ 20     Time In: 9:05am  Time Out: 10:00am  Total Billable Time: 55 minutes     Precautions: Standard, Diabetes, and CKD, cellulitis       Subjective     Pt reports: It has been a rough couple days, he isn't feeling great   He was compliant with home exercise program.  Response to previous treatment: ok  Functional change: wears his tubigrip most days when he works in the yard     Pain: 0/10  Location: bilateral lower legs     Objective     7/8/2024:     Pt presents with improved skin integrity, no open wounds today on BLEs     Treatment:   Jian received the following manual therapy techniques:- Manual Lymphatic Drainage were applied to the: RLE for  55 minutes, including: MLD and short stretch compression bandaging     MANUAL LYMPHATIC DRAINAGE (MLD):    While supine with LEs elevated stimulation at terminus, along GI region, B inguinal regions, drainage of entire RLE betina lower leg, ankle, and foot with return proximally,  Use of Aquaphor due to dryness.   Educated in self massage to abdominal areas, B inguinal areas, thigh, and remaining LE within reach.      MULTILAYERED BANDAGING:  issued supplies and bandaged R LE with cotton stockinette, komprex section dorsum of foot, 2 komprex wedges post malleoli, 2 komprex rolls ankle to knee, 1-8cm and 2- 10cm Durelast rolls  foot to knee, to leave intact 12-24 hrs as tolerated, discontinue with any problems, return rolled bandages next session. Wash and wear schedules confirmed.     Pt issued edemawear size M      Home Exercises Provided and Patient Education Provided     Education provided:    Remove bandages if painful   PATIENT/FAMILY Education: bandaging wear schedule,  HEP,  Beginning of self massage,  Self or assisted bandaging, compression options, and Risk reduction    Written Home Exercises Provided: Patient instructed to cont prior HEP.  Exercises were reviewed and Jian was able to demonstrate them prior to the end of the session.  Jian demonstrated good  understanding of the education provided.       Assessment     Patient presents with improved skin integrity but significant swelling of the BLEs with chronic redness and hyperkeratosis. Patient was educated extensively on importance of daily compression and pt is in agreement to improve compliance to continue therapy. Patient was issued edemawear size M, several pairs, to wear and instructed old tubigrip is not strong enough anymore      Jian Is progressing well towards his goals.   Pt prognosis is Good.     Pt will continue to benefit from skilled outpatient physical therapy to address the deficits listed in the problem list box on initial evaluation, provide pt/family education and to maximize pt's level of independence in the home and community environment.     Pt's spiritual, cultural and educational needs considered and pt agreeable to plan of care and goals.     Anticipated barriers to physical therapy: none     Goals:     Short Term Goals: (6 weeks)  1. Patient will show decreased girth in B LE by up to 1 cm to allow for LE symmetry, shoe and clothing choice, and ability to apply needed compression.  (progressing, not met)   2. Patient will demonstrate 100% knowledge of lymphedema precautions and signs of infection to allow for reduced lymphedema risk, infection risk,  and/or exacerbation of condition.  (progressing, not met)  3. Patient or caregiver will perform self-bandaging techniques and/or wearing of compression garments to allow for lymphatic drainage support, skin elasticity, and reduction in shape and size of limb. (progressing, not met)  4. Patient will perform self lymph drainage techniques to areas within reach to enhance lymphatic drainage and skin condition.  (progressing, not met)  5. Patient will tolerate daily activities with multilayered bandaging to allow for lymphatic and venous support.  (progressing, not met)     Long Term Goals: (12  weeks)  1. Patient will show decreased girth in b LE by up to 2 cm  to allow for LE symmetry, shoe and clothing choice, and ability to apply needed compression daily.  (progressing, not met)  2. Patient will show reduction in density to mild or less with improved contour of limb to allow for cosmesis, LE symmetry, infection risk reduction, and clothing and compression choice.   (progressing, not met)  3. Patient to krystal/doff compression garment with daily compliance to assist in lymphedema management, skin elasticity, and tissue density.  (progressing, not met)  4. Pt to show improved postural awareness and alignment.  (progressing, not met)  5. Pt to be I and compliant with HEP to allow for increased function in affected limb.   (progressing, not met)    Plan   Continue PT  2x   weekly for Complete Decongestive Therapy:  Manual lymphatic drainage, Multilayered short stretch bandaging, Pneumatic compression, Therapeutic exercises, Patient education as deemed necessary to achieve stated goals.      Luisa Thompson, PT

## 2024-07-29 ENCOUNTER — DOCUMENTATION ONLY (OUTPATIENT)
Dept: REHABILITATION | Facility: HOSPITAL | Age: 69
End: 2024-07-29
Payer: MEDICARE

## 2024-07-29 NOTE — PROGRESS NOTES
9:00am     Patient presents to clinic following week long hospital stay for NSTEMI. Patient's health may not allow for safe treatment at this point. Pt was strongly urged to follow up with Cardiologist and PCP to develop plan and ensure pt is safe to continue therapy       Luisa Donna, PT, DPT, CLT

## 2024-07-31 ENCOUNTER — PATIENT MESSAGE (OUTPATIENT)
Dept: REHABILITATION | Facility: HOSPITAL | Age: 69
End: 2024-07-31
Payer: MEDICARE

## 2024-10-01 ENCOUNTER — PATIENT MESSAGE (OUTPATIENT)
Dept: INTERNAL MEDICINE | Facility: CLINIC | Age: 69
End: 2024-10-01
Payer: MEDICARE

## 2024-10-07 ENCOUNTER — LAB VISIT (OUTPATIENT)
Dept: LAB | Facility: HOSPITAL | Age: 69
End: 2024-10-07
Attending: INTERNAL MEDICINE
Payer: MEDICARE

## 2024-10-07 DIAGNOSIS — I25.10 CORONARY ARTERY DISEASE DUE TO CALCIFIED CORONARY LESION: ICD-10-CM

## 2024-10-07 DIAGNOSIS — N18.30 TYPE 2 DIABETES MELLITUS WITH STAGE 3 CHRONIC KIDNEY DISEASE, WITH LONG-TERM CURRENT USE OF INSULIN, UNSPECIFIED WHETHER STAGE 3A OR 3B CKD: ICD-10-CM

## 2024-10-07 DIAGNOSIS — Z79.4 TYPE 2 DIABETES MELLITUS WITH STAGE 3 CHRONIC KIDNEY DISEASE, WITH LONG-TERM CURRENT USE OF INSULIN, UNSPECIFIED WHETHER STAGE 3A OR 3B CKD: ICD-10-CM

## 2024-10-07 DIAGNOSIS — I25.84 CORONARY ARTERY DISEASE DUE TO CALCIFIED CORONARY LESION: ICD-10-CM

## 2024-10-07 DIAGNOSIS — E11.22 TYPE 2 DIABETES MELLITUS WITH STAGE 3 CHRONIC KIDNEY DISEASE, WITH LONG-TERM CURRENT USE OF INSULIN, UNSPECIFIED WHETHER STAGE 3A OR 3B CKD: ICD-10-CM

## 2024-10-07 DIAGNOSIS — E11.42 DIABETIC POLYNEUROPATHY ASSOCIATED WITH TYPE 2 DIABETES MELLITUS: ICD-10-CM

## 2024-10-07 LAB
ALBUMIN/CREAT UR: 2508 UG/MG (ref 0–30)
BACTERIA #/AREA URNS AUTO: ABNORMAL /HPF
BACTERIA #/AREA URNS AUTO: ABNORMAL /HPF
BILIRUB UR QL STRIP: NEGATIVE
CLARITY UR REFRACT.AUTO: CLEAR
COLOR UR AUTO: YELLOW
CREAT UR-MCNC: 88 MG/DL (ref 23–375)
GLUCOSE UR QL STRIP: ABNORMAL
HGB UR QL STRIP: ABNORMAL
HYALINE CASTS UR QL AUTO: 2 /LPF
HYALINE CASTS UR QL AUTO: 2 /LPF
KETONES UR QL STRIP: NEGATIVE
LEUKOCYTE ESTERASE UR QL STRIP: NEGATIVE
MICROALBUMIN UR DL<=1MG/L-MCNC: 2207 UG/ML
MICROSCOPIC COMMENT: ABNORMAL
MICROSCOPIC COMMENT: ABNORMAL
NITRITE UR QL STRIP: NEGATIVE
PH UR STRIP: 7 [PH] (ref 5–8)
PROT UR QL STRIP: ABNORMAL
RBC #/AREA URNS AUTO: 6 /HPF (ref 0–4)
RBC #/AREA URNS AUTO: 7 /HPF (ref 0–4)
SP GR UR STRIP: 1.01 (ref 1–1.03)
URN SPEC COLLECT METH UR: ABNORMAL
WBC #/AREA URNS AUTO: 2 /HPF (ref 0–5)
WBC #/AREA URNS AUTO: 3 /HPF (ref 0–5)
YEAST UR QL AUTO: ABNORMAL

## 2024-10-07 PROCEDURE — 82043 UR ALBUMIN QUANTITATIVE: CPT | Performed by: INTERNAL MEDICINE

## 2024-10-07 PROCEDURE — 81001 URINALYSIS AUTO W/SCOPE: CPT | Performed by: INTERNAL MEDICINE

## 2024-10-07 PROCEDURE — 82570 ASSAY OF URINE CREATININE: CPT | Performed by: INTERNAL MEDICINE

## 2024-11-01 NOTE — PLAN OF CARE
Problem: Occupational Therapy Goal  Goal: Occupational Therapy Goal  Goals to be met by: 7/29/2019    Patient will increase functional independence with ADLs by performing:    UE Dressing with Modified Moore.  LE Dressing with Modified Moore.  Toileting from toilet with Modified Moore for hygiene and clothing management.   Supine to sit with Modified Moore.  Step transfer with Modified Moore  Toilet transfer to toilet with Modified Moore.  Increased functional strength to WFL for ADLS.  Upper extremity exercise program 10 reps per handout, with supervision.     Outcome: Ongoing (interventions implemented as appropriate)  Pt progressing towards all goals, remains limited 2/2 decreased safety awareness and mild impulsivity. Cont OT POC       No

## 2024-11-15 ENCOUNTER — LAB VISIT (OUTPATIENT)
Dept: LAB | Facility: HOSPITAL | Age: 69
End: 2024-11-15
Payer: MEDICARE

## 2024-11-15 DIAGNOSIS — R80.9 PROTEINURIA: ICD-10-CM

## 2024-11-15 LAB
CREAT UR-MCNC: 62 MG/DL (ref 23–375)
PROT UR-MCNC: 297 MG/DL (ref 0–15)
PROT/CREAT UR: 4.79 MG/G{CREAT} (ref 0–0.2)

## 2024-11-15 PROCEDURE — 82570 ASSAY OF URINE CREATININE: CPT | Performed by: STUDENT IN AN ORGANIZED HEALTH CARE EDUCATION/TRAINING PROGRAM

## 2024-11-25 ENCOUNTER — TELEPHONE (OUTPATIENT)
Dept: PODIATRY | Facility: CLINIC | Age: 69
End: 2024-11-25
Payer: MEDICARE

## 2024-11-25 NOTE — TELEPHONE ENCOUNTER
----- Message from Deyanira sent at 11/25/2024  1:59 PM CST -----  Type:  Sooner Apoointment Request    Caller is requesting a sooner appointment.  Caller declined first available appointment listed below.  Caller will not accept being placed on the waitlist and is requesting a message be sent to doctor.  Name of Caller: Pt  When is the first available appointment?  Symptoms: foot infected toenail (left)  Would the patient rather a call back or a response via [employer]? Call  Best Call Back Number: [phone]  Additional Information: Pt states he was seen at urgent care and was instructed to report to ER.  Pt states he prefer to see provider instead.

## 2024-11-25 NOTE — TELEPHONE ENCOUNTER
Called pt back and he told me, he went to get a pedicure on Saturday and the day after that his toe started to hurt and turned black and toenail looks inflamed. Went to urgent care and they told him he may has gangrene and need to go to see ER. Patient don't want to go to the ER and choose to send me a message to be seen by Dr. Felton. Scheduled patient for tomorrow morning at 8:00 am

## 2024-11-26 ENCOUNTER — TELEPHONE (OUTPATIENT)
Dept: PODIATRY | Facility: CLINIC | Age: 69
End: 2024-11-26
Payer: MEDICARE

## 2024-11-26 ENCOUNTER — OFFICE VISIT (OUTPATIENT)
Dept: PODIATRY | Facility: CLINIC | Age: 69
End: 2024-11-26
Payer: MEDICARE

## 2024-11-26 ENCOUNTER — HOSPITAL ENCOUNTER (INPATIENT)
Facility: HOSPITAL | Age: 69
LOS: 2 days | Discharge: HOME | DRG: 300 | End: 2024-11-28
Attending: EMERGENCY MEDICINE | Admitting: FAMILY MEDICINE
Payer: MEDICARE

## 2024-11-26 VITALS — DIASTOLIC BLOOD PRESSURE: 73 MMHG | SYSTOLIC BLOOD PRESSURE: 115 MMHG | HEART RATE: 84 BPM

## 2024-11-26 DIAGNOSIS — R60.9 EDEMA, UNSPECIFIED TYPE: ICD-10-CM

## 2024-11-26 DIAGNOSIS — L03.90 CELLULITIS, UNSPECIFIED CELLULITIS SITE: Primary | ICD-10-CM

## 2024-11-26 DIAGNOSIS — I96 GANGRENE: ICD-10-CM

## 2024-11-26 DIAGNOSIS — R07.9 CHEST PAIN: ICD-10-CM

## 2024-11-26 DIAGNOSIS — L03.90 CELLULITIS, UNSPECIFIED CELLULITIS SITE: ICD-10-CM

## 2024-11-26 DIAGNOSIS — I96 GANGRENE OF TOE OF LEFT FOOT: Primary | ICD-10-CM

## 2024-11-26 DIAGNOSIS — I70.222 CRITICAL LIMB ISCHEMIA OF LEFT LOWER EXTREMITY: ICD-10-CM

## 2024-11-26 LAB
ALBUMIN SERPL BCP-MCNC: 2.1 G/DL (ref 3.5–5.2)
ALP SERPL-CCNC: 132 U/L (ref 40–150)
ALT SERPL W/O P-5'-P-CCNC: 13 U/L (ref 10–44)
ANION GAP SERPL CALC-SCNC: 7 MMOL/L (ref 8–16)
AST SERPL-CCNC: 26 U/L (ref 10–40)
BASOPHILS # BLD AUTO: 0.03 K/UL (ref 0–0.2)
BASOPHILS NFR BLD: 0.3 % (ref 0–1.9)
BILIRUB SERPL-MCNC: 0.5 MG/DL (ref 0.1–1)
BUN SERPL-MCNC: 46 MG/DL (ref 8–23)
CALCIUM SERPL-MCNC: 8.4 MG/DL (ref 8.7–10.5)
CHLORIDE SERPL-SCNC: 101 MMOL/L (ref 95–110)
CO2 SERPL-SCNC: 28 MMOL/L (ref 23–29)
CREAT SERPL-MCNC: 3.4 MG/DL (ref 0.5–1.4)
CRP SERPL-MCNC: 88.1 MG/L (ref 0–8.2)
DIFFERENTIAL METHOD BLD: ABNORMAL
EOSINOPHIL # BLD AUTO: 0.1 K/UL (ref 0–0.5)
EOSINOPHIL NFR BLD: 0.5 % (ref 0–8)
ERYTHROCYTE [DISTWIDTH] IN BLOOD BY AUTOMATED COUNT: 13.2 % (ref 11.5–14.5)
EST. GFR  (NO RACE VARIABLE): 19 ML/MIN/1.73 M^2
GLUCOSE SERPL-MCNC: 172 MG/DL (ref 70–110)
HCT VFR BLD AUTO: 35.6 % (ref 40–54)
HGB BLD-MCNC: 11.6 G/DL (ref 14–18)
IMM GRANULOCYTES # BLD AUTO: 0.09 K/UL (ref 0–0.04)
IMM GRANULOCYTES NFR BLD AUTO: 0.8 % (ref 0–0.5)
LIPASE SERPL-CCNC: <3 U/L (ref 4–60)
LYMPHOCYTES # BLD AUTO: 1.3 K/UL (ref 1–4.8)
LYMPHOCYTES NFR BLD: 11.1 % (ref 18–48)
MCH RBC QN AUTO: 29.5 PG (ref 27–31)
MCHC RBC AUTO-ENTMCNC: 32.6 G/DL (ref 32–36)
MCV RBC AUTO: 91 FL (ref 82–98)
MONOCYTES # BLD AUTO: 1 K/UL (ref 0.3–1)
MONOCYTES NFR BLD: 8.5 % (ref 4–15)
NEUTROPHILS # BLD AUTO: 8.8 K/UL (ref 1.8–7.7)
NEUTROPHILS NFR BLD: 78.8 % (ref 38–73)
NRBC BLD-RTO: 0 /100 WBC
OHS QRS DURATION: 146 MS
OHS QRS DURATION: 150 MS
OHS QTC CALCULATION: 499 MS
OHS QTC CALCULATION: 521 MS
PLATELET # BLD AUTO: 232 K/UL (ref 150–450)
PMV BLD AUTO: 9.8 FL (ref 9.2–12.9)
POCT GLUCOSE: 182 MG/DL (ref 70–110)
POCT GLUCOSE: 239 MG/DL (ref 70–110)
POTASSIUM SERPL-SCNC: 4.3 MMOL/L (ref 3.5–5.1)
PROT SERPL-MCNC: 7.2 G/DL (ref 6–8.4)
RBC # BLD AUTO: 3.93 M/UL (ref 4.6–6.2)
SODIUM SERPL-SCNC: 136 MMOL/L (ref 136–145)
WBC # BLD AUTO: 11.23 K/UL (ref 3.9–12.7)

## 2024-11-26 PROCEDURE — 99999 PR PBB SHADOW E&M-EST. PATIENT-LVL III: CPT | Mod: PBBFAC,,, | Performed by: STUDENT IN AN ORGANIZED HEALTH CARE EDUCATION/TRAINING PROGRAM

## 2024-11-26 PROCEDURE — 87040 BLOOD CULTURE FOR BACTERIA: CPT | Performed by: PHYSICIAN ASSISTANT

## 2024-11-26 PROCEDURE — 1125F AMNT PAIN NOTED PAIN PRSNT: CPT | Mod: CPTII,S$GLB,, | Performed by: STUDENT IN AN ORGANIZED HEALTH CARE EDUCATION/TRAINING PROGRAM

## 2024-11-26 PROCEDURE — 99285 EMERGENCY DEPT VISIT HI MDM: CPT | Mod: 25

## 2024-11-26 PROCEDURE — 3062F POS MACROALBUMINURIA REV: CPT | Mod: CPTII,S$GLB,, | Performed by: STUDENT IN AN ORGANIZED HEALTH CARE EDUCATION/TRAINING PROGRAM

## 2024-11-26 PROCEDURE — 36247 INS CATH ABD/L-EXT ART 3RD: CPT | Mod: 50 | Performed by: INTERNAL MEDICINE

## 2024-11-26 PROCEDURE — 96365 THER/PROPH/DIAG IV INF INIT: CPT

## 2024-11-26 PROCEDURE — 75716 ARTERY X-RAYS ARMS/LEGS: CPT | Performed by: INTERNAL MEDICINE

## 2024-11-26 PROCEDURE — 3074F SYST BP LT 130 MM HG: CPT | Mod: CPTII,S$GLB,, | Performed by: STUDENT IN AN ORGANIZED HEALTH CARE EDUCATION/TRAINING PROGRAM

## 2024-11-26 PROCEDURE — 36247 INS CATH ABD/L-EXT ART 3RD: CPT | Mod: 50,,, | Performed by: INTERNAL MEDICINE

## 2024-11-26 PROCEDURE — 3066F NEPHROPATHY DOC TX: CPT | Mod: CPTII,S$GLB,, | Performed by: STUDENT IN AN ORGANIZED HEALTH CARE EDUCATION/TRAINING PROGRAM

## 2024-11-26 PROCEDURE — 83690 ASSAY OF LIPASE: CPT | Performed by: PHYSICIAN ASSISTANT

## 2024-11-26 PROCEDURE — 25000003 PHARM REV CODE 250: Performed by: INTERNAL MEDICINE

## 2024-11-26 PROCEDURE — 3051F HG A1C>EQUAL 7.0%<8.0%: CPT | Mod: CPTII,S$GLB,, | Performed by: STUDENT IN AN ORGANIZED HEALTH CARE EDUCATION/TRAINING PROGRAM

## 2024-11-26 PROCEDURE — 25000003 PHARM REV CODE 250: Performed by: STUDENT IN AN ORGANIZED HEALTH CARE EDUCATION/TRAINING PROGRAM

## 2024-11-26 PROCEDURE — B40DYZZ PLAIN RADIOGRAPHY OF AORTA AND BILATERAL LOWER EXTREMITY ARTERIES USING OTHER CONTRAST: ICD-10-PCS | Performed by: INTERNAL MEDICINE

## 2024-11-26 PROCEDURE — 99291 CRITICAL CARE FIRST HOUR: CPT | Mod: ,,, | Performed by: INTERNAL MEDICINE

## 2024-11-26 PROCEDURE — 3078F DIAST BP <80 MM HG: CPT | Mod: CPTII,S$GLB,, | Performed by: STUDENT IN AN ORGANIZED HEALTH CARE EDUCATION/TRAINING PROGRAM

## 2024-11-26 PROCEDURE — C1769 GUIDE WIRE: HCPCS | Performed by: INTERNAL MEDICINE

## 2024-11-26 PROCEDURE — 85025 COMPLETE CBC W/AUTO DIFF WBC: CPT | Performed by: PHYSICIAN ASSISTANT

## 2024-11-26 PROCEDURE — 99153 MOD SED SAME PHYS/QHP EA: CPT | Performed by: INTERNAL MEDICINE

## 2024-11-26 PROCEDURE — 99152 MOD SED SAME PHYS/QHP 5/>YRS: CPT | Performed by: INTERNAL MEDICINE

## 2024-11-26 PROCEDURE — 93010 ELECTROCARDIOGRAM REPORT: CPT | Mod: XU,,, | Performed by: INTERNAL MEDICINE

## 2024-11-26 PROCEDURE — 75716 ARTERY X-RAYS ARMS/LEGS: CPT | Mod: 26,,, | Performed by: INTERNAL MEDICINE

## 2024-11-26 PROCEDURE — C1887 CATHETER, GUIDING: HCPCS | Performed by: INTERNAL MEDICINE

## 2024-11-26 PROCEDURE — 27201423 OPTIME MED/SURG SUP & DEVICES STERILE SUPPLY: Performed by: INTERNAL MEDICINE

## 2024-11-26 PROCEDURE — 80053 COMPREHEN METABOLIC PANEL: CPT | Performed by: PHYSICIAN ASSISTANT

## 2024-11-26 PROCEDURE — 11000001 HC ACUTE MED/SURG PRIVATE ROOM

## 2024-11-26 PROCEDURE — 63600175 PHARM REV CODE 636 W HCPCS: Mod: JZ,JG | Performed by: INTERNAL MEDICINE

## 2024-11-26 PROCEDURE — C1894 INTRO/SHEATH, NON-LASER: HCPCS | Performed by: INTERNAL MEDICINE

## 2024-11-26 PROCEDURE — 86140 C-REACTIVE PROTEIN: CPT | Performed by: PHYSICIAN ASSISTANT

## 2024-11-26 PROCEDURE — 25000003 PHARM REV CODE 250: Performed by: PHYSICIAN ASSISTANT

## 2024-11-26 PROCEDURE — 99152 MOD SED SAME PHYS/QHP 5/>YRS: CPT | Mod: ,,, | Performed by: INTERNAL MEDICINE

## 2024-11-26 PROCEDURE — 63600175 PHARM REV CODE 636 W HCPCS: Performed by: PHYSICIAN ASSISTANT

## 2024-11-26 PROCEDURE — 93005 ELECTROCARDIOGRAM TRACING: CPT

## 2024-11-26 PROCEDURE — 99214 OFFICE O/P EST MOD 30 MIN: CPT | Mod: S$GLB,,, | Performed by: STUDENT IN AN ORGANIZED HEALTH CARE EDUCATION/TRAINING PROGRAM

## 2024-11-26 RX ORDER — HEPARIN SODIUM 200 [USP'U]/100ML
INJECTION, SOLUTION INTRAVENOUS
Status: DISCONTINUED | OUTPATIENT
Start: 2024-11-26 | End: 2024-11-28 | Stop reason: HOSPADM

## 2024-11-26 RX ORDER — AMLODIPINE BESYLATE 5 MG/1
5 TABLET ORAL DAILY
Status: DISCONTINUED | OUTPATIENT
Start: 2024-11-26 | End: 2024-11-27

## 2024-11-26 RX ORDER — VANCOMYCIN 2 GRAM/500 ML IN 0.9 % SODIUM CHLORIDE INTRAVENOUS
2000 ONCE
Status: DISCONTINUED | OUTPATIENT
Start: 2024-11-26 | End: 2024-11-27

## 2024-11-26 RX ORDER — NALOXONE HCL 0.4 MG/ML
0.02 VIAL (ML) INJECTION
Status: DISCONTINUED | OUTPATIENT
Start: 2024-11-26 | End: 2024-11-28 | Stop reason: HOSPADM

## 2024-11-26 RX ORDER — GLUCAGON 1 MG
1 KIT INJECTION
Status: DISCONTINUED | OUTPATIENT
Start: 2024-11-26 | End: 2024-11-28 | Stop reason: HOSPADM

## 2024-11-26 RX ORDER — CEFEPIME HYDROCHLORIDE 1 G/1
1 INJECTION, POWDER, FOR SOLUTION INTRAMUSCULAR; INTRAVENOUS
Status: DISCONTINUED | OUTPATIENT
Start: 2024-11-26 | End: 2024-11-27

## 2024-11-26 RX ORDER — CEFEPIME HYDROCHLORIDE 1 G/1
1 INJECTION, POWDER, FOR SOLUTION INTRAMUSCULAR; INTRAVENOUS
Status: DISCONTINUED | OUTPATIENT
Start: 2024-11-26 | End: 2024-11-26 | Stop reason: DRUGHIGH

## 2024-11-26 RX ORDER — VERAPAMIL HYDROCHLORIDE 2.5 MG/ML
INJECTION, SOLUTION INTRAVENOUS
Status: DISCONTINUED | OUTPATIENT
Start: 2024-11-26 | End: 2024-11-26 | Stop reason: HOSPADM

## 2024-11-26 RX ORDER — IBUPROFEN 200 MG
16 TABLET ORAL
Status: DISCONTINUED | OUTPATIENT
Start: 2024-11-26 | End: 2024-11-28 | Stop reason: HOSPADM

## 2024-11-26 RX ORDER — IBUPROFEN 200 MG
24 TABLET ORAL
Status: DISCONTINUED | OUTPATIENT
Start: 2024-11-26 | End: 2024-11-28 | Stop reason: HOSPADM

## 2024-11-26 RX ORDER — FENTANYL CITRATE 50 UG/ML
INJECTION, SOLUTION INTRAMUSCULAR; INTRAVENOUS
Status: DISCONTINUED | OUTPATIENT
Start: 2024-11-26 | End: 2024-11-26 | Stop reason: HOSPADM

## 2024-11-26 RX ORDER — LIDOCAINE HYDROCHLORIDE 10 MG/ML
INJECTION, SOLUTION EPIDURAL; INFILTRATION; INTRACAUDAL; PERINEURAL
Status: DISCONTINUED | OUTPATIENT
Start: 2024-11-26 | End: 2024-11-26 | Stop reason: HOSPADM

## 2024-11-26 RX ORDER — FUROSEMIDE 40 MG/1
80 TABLET ORAL DAILY
Status: DISCONTINUED | OUTPATIENT
Start: 2024-11-27 | End: 2024-11-28 | Stop reason: HOSPADM

## 2024-11-26 RX ORDER — MIDAZOLAM HYDROCHLORIDE 1 MG/ML
INJECTION, SOLUTION INTRAMUSCULAR; INTRAVENOUS
Status: DISCONTINUED | OUTPATIENT
Start: 2024-11-26 | End: 2024-11-26 | Stop reason: HOSPADM

## 2024-11-26 RX ORDER — ATORVASTATIN CALCIUM 40 MG/1
80 TABLET, FILM COATED ORAL NIGHTLY
Status: DISCONTINUED | OUTPATIENT
Start: 2024-11-26 | End: 2024-11-28 | Stop reason: HOSPADM

## 2024-11-26 RX ORDER — SODIUM CHLORIDE 0.9 % (FLUSH) 0.9 %
10 SYRINGE (ML) INJECTION EVERY 12 HOURS PRN
Status: DISCONTINUED | OUTPATIENT
Start: 2024-11-26 | End: 2024-11-28 | Stop reason: HOSPADM

## 2024-11-26 RX ORDER — CLOPIDOGREL BISULFATE 75 MG/1
75 TABLET ORAL DAILY
Status: DISCONTINUED | OUTPATIENT
Start: 2024-11-26 | End: 2024-11-28 | Stop reason: HOSPADM

## 2024-11-26 RX ORDER — HEPARIN SODIUM 1000 [USP'U]/ML
INJECTION, SOLUTION INTRAVENOUS; SUBCUTANEOUS
Status: DISCONTINUED | OUTPATIENT
Start: 2024-11-26 | End: 2024-11-26 | Stop reason: HOSPADM

## 2024-11-26 RX ORDER — METRONIDAZOLE 500 MG/100ML
500 INJECTION, SOLUTION INTRAVENOUS
Status: DISCONTINUED | OUTPATIENT
Start: 2024-11-26 | End: 2024-11-27

## 2024-11-26 RX ORDER — ISOSORBIDE MONONITRATE 30 MG/1
30 TABLET, EXTENDED RELEASE ORAL DAILY
Status: DISCONTINUED | OUTPATIENT
Start: 2024-11-26 | End: 2024-11-28 | Stop reason: HOSPADM

## 2024-11-26 RX ORDER — ASPIRIN 81 MG/1
81 TABLET ORAL DAILY
Status: DISCONTINUED | OUTPATIENT
Start: 2024-11-26 | End: 2024-11-28 | Stop reason: HOSPADM

## 2024-11-26 RX ORDER — ENOXAPARIN SODIUM 100 MG/ML
30 INJECTION SUBCUTANEOUS EVERY 24 HOURS
Status: DISCONTINUED | OUTPATIENT
Start: 2024-11-26 | End: 2024-11-28 | Stop reason: HOSPADM

## 2024-11-26 RX ADMIN — PIPERACILLIN AND TAZOBACTAM 4.5 G: 4; .5 INJECTION, POWDER, LYOPHILIZED, FOR SOLUTION INTRAVENOUS; PARENTERAL at 11:11

## 2024-11-26 RX ADMIN — ATORVASTATIN CALCIUM 80 MG: 40 TABLET, FILM COATED ORAL at 08:11

## 2024-11-26 NOTE — CONSULTS
[facility] Consult Note     Attending Physician: Sharifa Duque MD  Cardiology Attending Physician: Ramírez Mccullough MD  Date of Admit: 11/26/2024  Reason for Consult:  Critical limb ischemia      History of Present Illness:       Jian Arrieta is a pleasant 69 y.o. male with PMH noted below in A/P on a cruise about 2 weeks ago when he noted his left 2nd toe was bruised appearing.  Noted red blue discoloration.  At the same time does endorse several weeks of left lower extremity leg discomfort below the knee, described as achy/throbbing.  Additionally does endorse some chills however no recorded fevers.  Patient says he had a spider bite on his left arm however no bites on his left lower extremity.  enies any acute trauma to the affected foot.  Seen in podiatry clinic today, found to have gangrene and cellulitis of the left lower extremity.      History obtained by patient interview and supplemented by nursing documentation and chart review.   Objective   PMHx:  has a past medical history of Alcohol abuse, Anasarca (1/28/2019), Anemia, Anticoagulant long-term use, Arthropathy associated with neurological disorder (9/2/2015), Atherosclerosis, Charcot foot due to diabetes mellitus, Chronic combined systolic and diastolic heart failure (01/29/2019), Chronic pancreatitis (1/28/2019), CKD (chronic kidney disease) stage 3, GFR 30-59 ml/min, CKD (chronic kidney disease) stage 4, GFR 15-29 ml/min, Colon polyps, Coronary artery disease due to calcified coronary lesion (05/08/2015), Diabetic polyneuropathy associated with type 2 diabetes mellitus (9/2/2015), Diverticulosis (1/28/2019), DM type 2 with diabetic peripheral neuropathy (2/4/2019), Encounter for blood transfusion, Essential hypertension (1/28/2019), Former smoker (8/26/2015), Healed ulcer of left foot on examination (6/20/2017), Hematuria, Hydrocele, Hyperphosphatemia, Hypoalbuminemia (2/4/2019), Hypocalcemia, Lymphedema of both lower extremities  (1/29/2019), Mixed hyperlipidemia (5/8/2015), Morbid obesity with BMI of 50.0-59.9, adult (5/8/2015), Obstruction of right ureteropelvic junction (UPJ) due to stone (5/24/2021), Onychomycosis of multiple toenails with type 2 diabetes mellitus and peripheral neuropathy (6/20/2017), Perianal cyst, Proteinuria, Pseudocyst of pancreas (1/28/2019), Skin cancer, Sleep apnea (8/26/2015), Status post bariatric surgery (1/11/2016), Type 2 diabetes mellitus, with long-term current use of insulin (5/8/2015), and Urinary tract infection.   SurgHx:  has a past surgical history that includes Angioplasty; Cyst Removal; perianal surgery; Colonoscopy (N/A, 10/6/2015); Knee arthroscopy; Gastrectomy; Coronary artery bypass graft (2017); Coronary angiography (Right, 3/20/2019); Coronary bypass graft angiography (3/20/2019); Insertion of dialysis catheter (N/A, 5/17/2019); Angiography (N/A, 6/28/2019); Esophagogastroduodenoscopy (N/A, 7/8/2019); Endoscopic ultrasound of upper gastrointestinal tract (N/A, 2/26/2020); Laparoscopic cholecystectomy (N/A, 5/4/2020); Liver biopsy (N/A, 5/4/2020); Esophagogastroduodenoscopy (N/A, 5/13/2021); Colonoscopy (N/A, 5/13/2021); Excision of hydrocele (Bilateral, 6/16/2021); Cystoscopy w/ ureteral stent placement (Right, 6/16/2021); Fluoroscopy (N/A, 6/16/2021); Ureteroscopy (Right, 6/30/2021); Laser lithotripsy (N/A, 6/30/2021); Pyeloscopy (Right, 6/30/2021); Cystoscopy (Right, 6/30/2021); Replacement of stent (Right, 6/30/2021); Cystoscopy (N/A, 10/16/2023); Retrograde pyelography (Right, 10/16/2023); Ureteral stent placement (Right, 10/16/2023); Ureteroscopy (Right, 10/16/2023); Pyeloscopy (Right, 10/16/2023); Cystoscopy (N/A, 12/6/2023); Ureteroscopy (Right, 12/6/2023); removal-stent (Right, 12/6/2023); Retrograde pyelography (Right, 12/6/2023); extraction - stone (Right, 12/6/2023); and Pyeloscopy (Right, 12/6/2023).   FamHx: family history includes Cancer in his brother, father, mother, and  "paternal grandfather; Diabetes in his maternal grandmother; Heart disease in his father; No Known Problems in his paternal grandmother; Obesity in his sister; Parkinsonism in his brother; Stroke in his maternal grandfather.   SocialHx:  reports that he quit smoking about 19 years ago. His smoking use included cigarettes. He started smoking about 49 years ago. He has a 60 pack-year smoking history. He has never used smokeless tobacco. He reports that he does not drink alcohol and does not use drugs.  Home Meds:  Current Outpatient Medications   Medication Instructions    acetaminophen (TYLENOL) 650 mg, Oral, Every 8 hours PRN    amLODIPine (NORVASC) 10 mg, Oral, Daily    aspirin (ECOTRIN) 81 mg, Oral, Daily    blood pressure monitor Kit 1 kit, Misc.(Non-Drug; Combo Route), 2 times daily    blood sugar diagnostic (ONETOUCH VERIO TEST STRIPS) Strp 1 each, Misc.(Non-Drug; Combo Route), 2 times daily    blood-glucose sensor (DEXCOM G6 SENSOR) Sandi 1 each, Subcutaneous, Every 10 days    blood-glucose transmitter (DEXCOM G6 TRANSMITTER) Sandi 1 each, Subcutaneous, Every 10 days    glucagon (BAQSIMI) 3 mg/actuation Spry 1 each, Nasal, Daily PRN    HumaLOG KwikPen Insulin 5 Units, Subcutaneous, 3 times daily before meals PRN, This is emergency back up insulin to use with meals if OFF of insulin pump    hydrALAZINE (APRESOLINE) 25 mg, Oral, Every 8 hours    insulin detemir U-100 (Levemir) 30 Units, Subcutaneous, Nightly, This is emergency back up insulin only if OFF of your insulin pump    insulin pump cart,automated,BT (OMNIPOD 5 G6 PODS, GEN 5,) Crtg INJECT 1 EACH INTO THE SKIN EVERY OTHER DAY.    lancets (ONETOUCH DELICA PLUS LANCET) 33 gauge Misc 1 lancet , Misc.(Non-Drug; Combo Route), 2 times daily    LYUMJEV U-100 INSULIN 80 Units, Subcutaneous, Continuous, Uses up to 80 units daily with omnipod 5 insulin pump as directed.    pen needle, diabetic 32 gauge x 5/32" Ndle Use with toujeo insulin one daily only as Emergency " "use/back up insulin - if OFF OF your insulin pump.    rosuvastatin (CRESTOR) 20 mg, Daily    torsemide (DEMADEX) 20 mg, Oral, Daily     Review of Systems: Comprehensive ROS was performed and is negative unless otherwise noted in HPI.     Objective:   Last 24 Hour Vital Signs:  BP  Min: 115/73  Max: 153/71  Temp  Av.4 °F (36.9 °C)  Min: 98.4 °F (36.9 °C)  Max: 98.4 °F (36.9 °C)  Pulse  Av.7  Min: 82  Max: 84  Resp  Av  Min: 16  Max: 16  SpO2  Av %  Min: 96 %  Max: 96 %  Height  Av' 7" (170.2 cm)  Min: 5' 7" (170.2 cm)  Max: 5' 7" (170.2 cm)  Weight  Av.5 kg (270 lb)  Min: 122.5 kg (270 lb)  Max: 122.5 kg (270 lb)  No intake/output data recorded.  Physical Examination:  Constitutional: NAD, Atraumatic   HEENT: MMM  Cardiovascular: RRR, no murmurs noted, no chest tenderness to palpation, 2+ radial pulses b/l  Pulmonary: normal respiratory effort, CTAB, no crackles, wheezes  Abdominal: S/NT/ND  Musculoskeletal:  Bilateral edema, gangrene of left 2nd toe  Neurological: Alert & oriented to self, location, time and situation. No gross neurological deficits  Per below  Psych: Appropriate affect, normal mood+      Laboratory:  Personally reviewed    Other Results:  TTE:  Results for orders placed during the hospital encounter of 10/12/23    Echo    Interpretation Summary    Left Ventricle: The left ventricle is normal in size. Normal wall thickness. Septal motion is consistent with post-operative status. There is low normal systolic function with a visually estimated ejection fraction of 50 - 55%. There is normal diastolic function.    Right Ventricle: Normal right ventricular cavity size. Wall thickness is normal. Right ventricle wall motion  is normal. Systolic function is normal.    Pulmonary Artery: The estimated pulmonary artery systolic pressure is 21 mmHg.    IVC/SVC: Normal venous pressure at 3 mmHg.    Stress Testing:   No results found for this or any previous visit.     Coronary " Angiogram:  No results found for this or any previous visit.    Ultrasound arterial left leg 11/26/2024  FINDINGS:  Peak systolic velocities were obtained as follows (in cm/sec):     Left common femoral:  155, monophasic waveform     Left deep femoral:  77, biphasic waveform     Left superficial femoral proximal: 209, monophasic waveform     Left superficial femoral mid: 132, monophasic waveform     Left superficial femoral distal: 122, monophasic waveform     Left proximal popliteal:  7, monophasic waveform     Left distal popliteal:  100, monophasic waveform     Left anterior tibial:  155, monophasic waveform proximally. Mid and distal aspects without definite flow. Collaterals are suggested in the region.     Left posterior tibial:  178, monophasic waveform proximally. Mid and distal aspects without definite flow.        Lack of flow involving the mid and distal aspects of the left anterior and posterior tibial arteries.     Remaining interrogated vessels appear patent, with waveform morphology suggesting peripheral arterial disease and or proximal steno-occlusive disease.    32 minutes were spent on this visit including time personally:  -Reviewing Medical records & lab results  -Independently reviewing and interpreting (if not documented by myself) EKGs, echocardiograms, catherizations   -Obtaining a history, performing a relevant exam, counseling/educating the patient/family  -Documenting clinical information in the EMR including ordering of tests  -Care coordination and communicating with other health care providers             Assessment/Plan:     Active Hospital Problems    Diagnosis    *Critical limb ischemia of left lower extremity    Gangrene of toe of left foot    Stage 4 chronic kidney disease    Chronic combined systolic and diastolic congestive heart failure    Hx of CABG    Other cirrhosis of liver    Paroxysmal atrial fibrillation    Status post bariatric surgery    Sleep apnea    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 male with a PMHx  pAF(not on AC per patient preference) CAD s/p PCI (x5) with subsequent CABG x3v (~2017 at ; LIMA-LAD [patent], SVG-OM [patent], SVG-RCA [occluded; L->R collaterals from LAD to PDA 7/2024 [facility]]), HFmrEF(EF 40-45 7/14/2024 at ), CKD (IV),HLD, DM2, BERHANE on CPAP, R knee OA, Charcot foot, normocytic anemia, recurrent ascites (CT with findings of cirrhosis but biopsy negative; has large pancreatic pseudocyst) due to portal vein thrombosis s/p angioplasty, aortic atherosclerosis (CT), Venous insufficiency (previously in lymphedema clinic) presents with CLI of left 2nd toe as per above.  Podiatry and ID following his inpatient.  Given Zosyn in the ED. vital signs stable, creatinine elevated, Doppler ultrasounds of the venous system without DVT, and arterial with concern for significant disease of the left lower extremity.    -Keep NPO  -Plan for LLE angiogram (w/ C02, minimal contrast) for assessment of CLI  -continue home p.o. Lasix 80 mg daily  -continue home metoprolol 50 XL, rosuvastatin 40, Imdur 30  -continue asa 81mg daily, plavix 75mg daily due to recent NSTEMI at [facility] 07/14/2024    Thank you for the opportunity to care for this patient. Please don't hesitate to reach out with any questions/concerns,      Ramírez Mccullough MD  Interventional Cardiology  [facility] [place]

## 2024-11-26 NOTE — BRIEF OP NOTE
Procedure performed:  Right radial artery access  Abdominal aortic  CO2 angiogram  Right lower extremity  CO2 angiogram  Left lower extremity CO2 angiogram        Indication for the procedure:   Left 2nd toe gangrene        Description of the procedure:  The patient was brought to the cath lab and given sedation using Versed and fentanyl.  Risks and benefits of the procedure discussed with the patient and written consent obtained prior to the procedure. After the patient was sedated the right radial artery access was achieved using micropuncture technique and ultrasound guidance.  A 4/5 slender sheath was placed in the right radial artery.   IM catheter was used to cross into the descending aorta and then over 035 wire a 4 Welsh pigtail  was used to cross into the descending aorta and a  CO2 abdominal aortic angiogram was performed.  PVcatheter was used to access the left common iliac artery and diagnostic  CO2 angiogram was performed of the left lower extremity.  Next a pullback of the PV catheter was performed back into the abdominal aorta.  Using a J-wire the pubic catheter was used to access into the right common iliac artery and peripheral CO2 angiogram of the right lower extremity was performed.        No immediate complication  as a part of this procedure.  No  iodinated contrast was used during this procedure.      Findings:    Left lower extremity:  Common iliac artery:  no significant disease  External iliac artery: no significant disease  Internal iliac artery: no significant disease  Common femoral artery no significant disease  Superficial femoral artery:  mild diffuse calcified disease without any areas of focal stenosis  Profunda femoris artery:  no significant disease  Popliteal artery: no significant disease.  Popliteal artery branches into anterior tibial/peroneal/popliteal artery  Anterior tibial artery:  no significant disease.  Gives rise to DP at the dorsum of the foot and supplies the plantar  arch  Posterior tibial artery:   long segment occlusion in the mid to distal posterior tibial artery.  Distal PT reconstitutes via collaterals from the peroneal artery  Peroneal artery:  no significant disease      Right lower extremity:  Common iliac artery:  no significant disease  External iliac artery:  no significant disease  Internal iliac artery:  no significant disease  Common femoral artery  no significant disease     Unable to assess right lower extremity beyond distal SFA because of significant pain in right lower extremity  During CO2 angiography.   Patient does have triphasic DP on examination post procedure.  Recommend arterial ultrasound of right lower extremity if clinically warranted.       Recommendations:     Angiosome supplying the 2nd left toe well-perfused via the anterior tibial/ dorsalis pedis artery.  Recommend medical therapy for mid  left posterior tibial artery occlusion with aspirin and high-intensity statin.    Right lower extremity arterial ultrasound if clinically warranted   No iodinated contrast used as a part of this procedure

## 2024-11-26 NOTE — Clinical Note
Catheter is inserted into the ostium right coronary artery. The vessel(s) were injected and visualized in multiple views.  room air

## 2024-11-26 NOTE — INTERVAL H&P NOTE
The patient has been examined and the H&P has been reviewed:    I concur with the findings and no changes have occurred since H&P was written.    Anesthesia/Surgery risks, benefits and alternative options discussed and understood by patient/family.      Left 2nd toe CLI with gangrene, arterial ultrasound concerning for below-knee and popliteal disease.  History of CAD, heart failure with mildly reduced ejection fraction, CABG, AFib, last dose of Eliquis more than 24 hours ago.  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

## 2024-11-26 NOTE — CONSULTS
[facility] ([facility])  Podiatry  Consult Note    Patient Name: Jian Arrieta  MRN: [id number]  Admission Date: 11/26/2024  Hospital Length of Stay: 0 days  Attending Physician: Sharifa Duque MD  Primary Care Provider: Leon Barajas DO     Inpatient consult to Podiatry  Consult performed by: Shoshana Pierre DPM  Consult ordered by: Sharifa Duque MD  Reason for consult: see below        Subjective:     History of Present Illness:  69M with a Pmhc of CKD4, CAD, HF, sleep apnea who was admitted and Podiatry was consulted for CLI of LLE. Patient say the blackness of L toe started about 2 weeks ago. He denies any traum to his L foot. Denies any drainage from his foot. Denies any pain. He reports having nausea and chill but no fevers or chills. Patient said he was bit by a spider on his L arm during his cruise last week. He was put on doxycycline abx which his L arm spider bite has greatly improved. He reports that the b/l leg celluilitus and swelling has been a chronic problem. He reports wearing compression stockings and keeping his legs ellevated. Denies SOB or chest pain. Denies any other pedal complaints.     VSS, afebirle, WBC WNL, CRP 88, . XR L foot shows no OM or gas. US LE veins shows no DVT. US LE arterial shows lack of flow involving the mid and distal aspects of the L anterior and posterior tibial arteries. Remaining interrogated vessels appear patent, with waveform morphology suggesting PAD and or proximal steno-occlusive disease.    Scheduled Meds:   amLODIPine  5 mg Oral Daily    atorvastatin  80 mg Oral QHS    enoxparin  30 mg Subcutaneous Daily     Continuous Infusions:  PRN Meds:  Current Facility-Administered Medications:     dextrose 10%, 12.5 g, Intravenous, PRN    dextrose 10%, 25 g, Intravenous, PRN    glucagon (human recombinant), 1 mg, Intramuscular, PRN    glucose, 16 g, Oral, PRN    glucose, 24 g, Oral, PRN    naloxone, 0.02 mg, Intravenous, PRN    sodium chloride  0.9%, 10 mL, Intravenous, Q12H PRN    Review of patient's allergies indicates:  No Known Allergies     Past Medical History:   Diagnosis Date    Alcohol abuse     Anasarca 1/28/2019    Anemia     Anticoagulant long-term use     Arthropathy associated with neurological disorder 9/2/2015    Atherosclerosis     Charcot foot due to diabetes mellitus     Chronic combined systolic and diastolic heart failure 01/29/2019 1-28-19 Left VentricleModerate decreased ejection fraction at 30%. Normal left ventricular cavity size. Normal wall thickness observed. Grade I (mild) left ventricular diastolic dysfunction consistent with impaired relaxation. Normal left atrial pressure. Moderate, global hypokinesis(see wall scoring diagram). Right VentricleNormal cavity size, wall thickness and ejection fraction. Wall motion n    Chronic pancreatitis 1/28/2019    CKD (chronic kidney disease) stage 3, GFR 30-59 ml/min     CKD (chronic kidney disease) stage 4, GFR 15-29 ml/min     Colon polyps     approx 5 yrs ago    Coronary artery disease due to calcified coronary lesion 05/08/2015    5 stents on ASA      Diabetic polyneuropathy associated with type 2 diabetes mellitus 9/2/2015    Diverticulosis 1/28/2019    DM type 2 with diabetic peripheral neuropathy 2/4/2019    Encounter for blood transfusion     Essential hypertension 1/28/2019    Former smoker 8/26/2015    Healed ulcer of left foot on examination 6/20/2017    Hematuria     Hydrocele     approx 1.5 yrs ago    Hyperphosphatemia     Hypoalbuminemia 2/4/2019    Hypocalcemia     Lymphedema of both lower extremities 1/29/2019    Mixed hyperlipidemia 5/8/2015    Morbid obesity with BMI of 50.0-59.9, adult 5/8/2015    Obstruction of right ureteropelvic junction (UPJ) due to stone 5/24/2021    Onychomycosis of multiple toenails with type 2 diabetes mellitus and peripheral neuropathy 6/20/2017    Perianal cyst     approx 2 yrs ago    Proteinuria     Pseudocyst of pancreas 1/28/2019     1-28-19 Liver has a cirrhotic morphology with no focal lesions.  Significant interval increase in ascites when compared to prior exam which may account for patient's abdominal distension.  Hypodense air-fluid collection along the body of the pancreas which is slightly smaller when compared to prior CT.  Findings may relate to pancreatic necrosis with pancreatic pseudocysts with infected pseudocyst    Skin cancer     skin cancer    Sleep apnea 8/26/2015    Status post bariatric surgery 1/11/2016    Type 2 diabetes mellitus, with long-term current use of insulin 5/8/2015    Urinary tract infection      Past Surgical History:   Procedure Laterality Date    ANGIOGRAPHY N/A 6/28/2019    Procedure: ANGIOGRAM-PV STENT;  Surgeon: E[facility] Diagnostic Provider;  Location: [facility] OR;  Service: Radiology;  Laterality: N/A;    ANGIOPLASTY      total x5 stents    COLONOSCOPY N/A 10/6/2015    Procedure: COLONOSCOPY;  Surgeon: Shekhar Richards MD;  Location: [facility] ENDO (2ND FLR);  Service: Endoscopy;  Laterality: N/A;  BMI over 55/2nd floor case    5 day hold Plavix, Dr Kwadwo Arroyo    COLONOSCOPY N/A 5/13/2021    Procedure: COLONOSCOPY;  Surgeon: Huan Brumfield MD;  Location: [facility] ENDO;  Service: Endoscopy;  Laterality: N/A;    CORONARY ANGIOGRAPHY Right 3/20/2019    Procedure: ANGIOGRAM, CORONARY ARTERY;  Surgeon: Bob Duque MD;  Location: [facility] CATH LAB;  Service: Cardiology;  Laterality: Right;    CORONARY ARTERY BYPASS GRAFT  2017    x3    CORONARY BYPASS GRAFT ANGIOGRAPHY  3/20/2019    Procedure: Bypass graft study;  Surgeon: Bob Duque MD;  Location: [facility] CATH LAB;  Service: Cardiology;;    CYST REMOVAL      CYSTOSCOPY Right 6/30/2021    Procedure: CYSTOSCOPY;  Surgeon: William Diaz MD;  Location: [facility] 1ST FLR;  Service: Urology;  Laterality: Right;    CYSTOSCOPY N/A 10/16/2023    Procedure: CYSTOSCOPY;  Surgeon: Chrystal Dias MD;  [address]  Service: Urology;  Laterality: N/A;     CYSTOSCOPY N/A 12/6/2023    Procedure: CYSTOSCOPY;  Surgeon: William Diaz MD;  [facility]: [facility] OR 1ST FLR;  Service: Urology;  Laterality: N/A;    CYSTOSCOPY W/ URETERAL STENT PLACEMENT Right 6/16/2021    Procedure: CYSTOSCOPY, WITH URETERAL STENT INSERTION;  Surgeon: William Diaz MD;  Location: [facility] OR 2ND FLR;  Service: Urology;  Laterality: Right;  FLUORO LESS THAN 1 HOUR    ENDOSCOPIC ULTRASOUND OF UPPER GASTROINTESTINAL TRACT N/A 2/26/2020    Procedure: ULTRASOUND, UPPER GI TRACT, ENDOSCOPIC;  Surgeon: Robbie Yang MD;  Location: [facility] ENDO;  Service: Endoscopy;  Laterality: N/A;    ESOPHAGOGASTRODUODENOSCOPY N/A 7/8/2019    Procedure: ESOPHAGOGASTRODUODENOSCOPY (EGD);  Surgeon: Huan Brumfield MD;  Location: [facility] ENDO;  Service: Endoscopy;  Laterality: N/A;    ESOPHAGOGASTRODUODENOSCOPY N/A 5/13/2021    Procedure: EGD (ESOPHAGOGASTRODUODENOSCOPY);  Surgeon: Huan Brumfield MD;  Location: [facility] ENDO;  Service: Endoscopy;  Laterality: N/A;    EXCISION OF HYDROCELE Bilateral 6/16/2021    Procedure: HYDROCELE REPAIR;  Surgeon: William Diaz MD;  Location: [facility] OR 2ND FLR;  Service: Urology;  Laterality: Bilateral;  2 HOURS    EXTRACTION - STONE Right 12/6/2023    Procedure: EXTRACTION - STONE;  Surgeon: William Diaz MD;  Location: [facility] OR 1ST FLR;  Service: Urology;  Laterality: Right;    FLUOROSCOPY N/A 6/16/2021    Procedure: FLUOROSCOPY;  Surgeon: William Diaz MD;  Location: [facility] OR 2ND FLR;  Service: Urology;  Laterality: N/A;    GASTRECTOMY      INSERTION OF DIALYSIS CATHETER N/A 5/17/2019    Procedure: pleurx;  Surgeon: [facility] Diagnostic Provider;  Location: [facility] OR 2ND FLR;  Service: General;  Laterality: N/A;  Room 188/[place]    KNEE ARTHROSCOPY      LAPAROSCOPIC CHOLECYSTECTOMY N/A 5/4/2020    Procedure: CHOLECYSTECTOMY, LAPAROSCOPIC;  Surgeon: SON Rowe MD;  Location: [facility];  Service: General;  Laterality: N/A;    LASER LITHOTRIPSY N/A 6/30/2021    Procedure: LITHOTRIPSY, USING  LASER;  Surgeon: William Diaz MD;  Location: [facility] OR 1ST FLR;  Service: Urology;  Laterality: N/A;    LIVER BIOPSY N/A 5/4/2020    Procedure: BIOPSY, LIVER, Laproscopic ;  Surgeon: SON Rowe MD;  Location: [facility] OR;  Service: General;  Laterality: N/A;    perianal surgery      perianal cyst removed    PYELOSCOPY Right 6/30/2021    Procedure: PYELOSCOPY;  Surgeon: William Diaz MD;  Location: [facility] OR 1ST FLR;  Service: Urology;  Laterality: Right;    PYELOSCOPY Right 10/16/2023    Procedure: PYELOSCOPY;  Surgeon: Chrystal Dias MD;  [facility];  Service: Urology;  Laterality: Right;    PYELOSCOPY Right 12/6/2023    Procedure: PYELOSCOPY;  Surgeon: William Diaz MD;  Location: [facility] OR 1ST FLR;  Service: Urology;  Laterality: Right;    REMOVAL-STENT Right 12/6/2023    Procedure: REMOVAL-STENT;  Surgeon: William Diaz MD;  Location: [facility] OR 1ST FLR;  Service: Urology;  Laterality: Right;    REPLACEMENT OF STENT Right 6/30/2021    Procedure: REPLACEMENT, STENT;  Surgeon: William Diaz MD;  Location: [facility] OR 1ST FLR;  Service: Urology;  Laterality: Right;    RETROGRADE PYELOGRAPHY Right 10/16/2023    Procedure: PYELOGRAM, RETROGRADE;  Surgeon: Chrystal Dias MD;  [facility];  Service: Urology;  Laterality: Right;    RETROGRADE PYELOGRAPHY Right 12/6/2023    Procedure: PYELOGRAM, RETROGRADE;  Surgeon: William Diaz MD;  [facility];  Service: Urology;  Laterality: Right;    URETERAL STENT PLACEMENT Right 10/16/2023    Procedure: INSERTION, STENT, URETER;  Surgeon: Chrystal Dias MD;  Location: [facility] OR 1ST FLR;  Service: Urology;  Laterality: Right;    URETEROSCOPY Right 6/30/2021    Procedure: URETEROSCOPY FLEXIBLE URETEROSCOPE;  Surgeon: William Diaz MD;  Location: [facility] OR 1ST FLR;  Service: Urology;  Laterality: Right;    URETEROSCOPY Right 10/16/2023    Procedure: URETEROSCOPY;  Surgeon: Chrystal Dias MD;  Location:  [facility] OR 1ST FLR;  Service: Urology;  Laterality: Right;    URETEROSCOPY Right 2023    Procedure: URETEROSCOPY;  Surgeon: William Diaz MD;  Location: [facility] OR 1ST FLR;  Service: Urology;  Laterality: Right;       Family History       Problem Relation (Age of Onset)    Cancer Mother, Father, Paternal Grandfather, Brother    Diabetes Maternal Grandmother    Heart disease Father    No Known Problems Paternal Grandmother    Obesity Sister    Parkinsonism Brother    Stroke Maternal Grandfather          Tobacco Use    Smoking status: Former     Current packs/day: 0.00     Average packs/day: 2.0 packs/day for 30.0 years (60.0 ttl pk-yrs)     Types: Cigarettes     Start date: 1975     Quit date: 2005     Years since quittin.8    Smokeless tobacco: Never   Substance and Sexual Activity    Alcohol use: No     Comment: started ~, reports 1 shot daily, max 3 shots daily, vague about alcohol consumption. Last drink 2018    Drug use: No    Sexual activity: Not on file     Review of Systems   Constitutional:  Negative for fatigue and fever.   HENT: Negative.     Eyes: Negative.    Respiratory: Negative.     Cardiovascular: Negative.    Gastrointestinal: Negative.    Endocrine: Negative.    Genitourinary: Negative.    Skin:  Positive for color change and wound.        Reports discoloration to L 2nd digit   Allergic/Immunologic: Negative.    Hematological: Negative.    Psychiatric/Behavioral: Negative.       Objective:     Vital Signs (Most Recent):  Temp: 98.4 °F (36.9 °C) (24 08)  Pulse: 82 (24 0919)  Resp: 16 (24 08)  BP: (!) 153/71 (24 1235)  SpO2: 96 % (24 08) Vital Signs (24h Range):  Temp:  [98.4 °F (36.9 °C)] 98.4 °F (36.9 °C)  Pulse:  [82-84] 82  Resp:  [16] 16  SpO2:  [96 %] 96 %  BP: (115-153)/(69-73) 153/71     Weight: 122.5 kg (270 lb)  Body mass index is 42.29 kg/m².    Foot Exam    Right Foot/Ankle     Inspection and Palpation  Ecchymosis: none  Tenderness:  none   Swelling: dorsum   Skin Exam: dry skin, cellulitis and erythema; no drainage, no skin changes and no ulcer     Neurovascular  Dorsalis pedis: doppler  Posterior tibial: doppler  Saphenous nerve sensation: diminished  Tibial nerve sensation: diminished  Superficial peroneal nerve sensation: diminished  Deep peroneal nerve sensation: diminished  Sural nerve sensation: diminished    Comments  RLE: Redness and swelling noted to leg. No open wounds noted.     Left Foot/Ankle      Inspection and Palpation  Ecchymosis: second toe  Tenderness: none   Swelling: dorsum and lesser metatarsophalangeal joints   Skin Exam: cellulitis;     Neurovascular  Dorsalis pedis: doppler  Posterior tibial: doppler  Saphenous nerve sensation: diminished  Tibial nerve sensation: diminished  Superficial peroneal nerve sensation: diminished  Deep peroneal nerve sensation: diminished  Sural nerve sensation: diminished    Comments  LLE: Gangrene to 2nd digit distal tuft with surounding erythema. No masceration or granage noted to 2nd digit. Redness and swelling to leg. No other open wounds noted. No tenderness to palpation, crepitus, fluctuance, or malodor noted.     Laboratory:  BMP:   Recent Labs   Lab 11/26/24  1001   *      K 4.3      CO2 28   BUN 46*   CREATININE 3.4*   CALCIUM 8.4*     CBC:   Recent Labs   Lab 11/26/24  1001   WBC 11.23   RBC 3.93*   HGB 11.6*   HCT 35.6*      MCV 91   MCH 29.5   MCHC 32.6       Diagnostic Results:  I have reviewed all pertinent imaging results/findings within the past 24 hours.    Clinical Findings:          Assessment/Plan:     Orthopedic  Gangrene of toe of left foot  Assesment  Erythema and swelling to b/l legs consistant with SSTI cellulitus. Dry gangrene to L 2nd digit. No masceration, drainage, crepitus, or fluctuance noted. Hendricks grade 4. XR L foot shows unremarkable. US LE veins shows no DVT. US LE arterial shows lack of flow involving the mid and distal aspects of  the L anterior and posterior tibial arteries. Remaining interrogated vessels appear patent, with waveform morphology suggesting PAD and or proximal steno-occlusive disease.    Plan  -Physical exam and imaging findings discussed with the patient. Disscused with patient that b/l legs consitant with cellulitus and dry stable gangrene to L 2nd digit.  -No surgical intervention at this time. Recommend continuing with abx and wound care.  -Abx per Primary  -Cardiogly consulted, recs appreciated  -Interventional Cardiology consulted, recs appreciated. Plan for LLE angiogram  -Advised patient to keep b/l LE elevated and compression for LE swelling  -L 2nd digit painted with betadine and left open to air dry  -Wound care orders placed  -WBAT  -Podiatry will continue to follow        Thank you for your consult. I will follow-up with patient. Please contact us if you have any additional questions.    Shoshana Pierre DPM  Podiatry  [place] [facility] ([facility])

## 2024-11-26 NOTE — PLAN OF CARE
Patient transfered to cath lab bay # 5 via stretcher with side rails up x2. Pt AAOx4 and able to follow commands. Pt is stable when connecting to cardiac monitors. VSS.     Right radial vasc band in place with 12 cc of air in band. Site is CDI. No redness, bruising, or hematoma noted around site. +2 marysol radial pulses palpated. Dopplered marysol pedal pulses. Skin normal in color and warm to touch, <3 sec cap refill.  Fall risk precautions given and patient acknowledges.  AIDET completed to pt. Will continue to monitor patient.

## 2024-11-26 NOTE — PROGRESS NOTES
Pharmacist Renal Dose Adjustment Note    Jian Arrieta is a 69 y.o. male being treated with the medication cefepime    Patient Data:    Vital Signs (Most Recent):  Temp: 98.4 °F (36.9 °C) (11/26/24 0848)  Pulse: 82 (11/26/24 0919)  Resp: 16 (11/26/24 0848)  BP: (!) 153/71 (11/26/24 1235)  SpO2: 96 % (11/26/24 0848) Vital Signs (72h Range):  Temp:  [98.4 °F (36.9 °C)]   Pulse:  [82-84]   Resp:  [16]   BP: (115-153)/(69-73)   SpO2:  [96 %]      Recent Labs   Lab 11/26/24  1001   CREATININE 3.4*     Serum creatinine: 3.4 mg/dL (H) 11/26/24 1001  Estimated creatinine clearance: 25.4 mL/min (A)    Medication:cefepime dose: 1 gm  frequency Q 12 hrs will be changed to medication: cefepime dose:1 gm frequency:Q 24 hrs    Pharmacist's Name: Toya Severino  Pharmacist's Extension: 0001

## 2024-11-26 NOTE — PROGRESS NOTES
Subjective:     Patient ID: Jian Arrieta is a 69 y.o. male.    Chief Complaint: Foot Problem (Left ft., 2nd toe, looks like gangrene) and Wound Care    Jian is a 69 y.o. male who presents to the clinic upon referral from Dr. Monica vázquez. provider found  for evaluation and treatment of diabetic feet. Jian has a past medical history of Alcohol abuse, Anasarca (2019), Anemia, Anticoagulant long-term use, Arthropathy associated with neurological disorder (2015), Atherosclerosis, Charcot foot due to diabetes mellitus, Chronic combined systolic and diastolic heart failure (2019), Chronic pancreatitis (2019), CKD (chronic kidney disease) stage 3, GFR 30-59 ml/min, CKD (chronic kidney disease) stage 4, GFR 15-29 ml/min, Colon polyps, Coronary artery disease due to calcified coronary lesion (2015), Diabetic polyneuropathy associated with type 2 diabetes mellitus (2015), Diverticulosis (2019), DM type 2 with diabetic peripheral neuropathy (2019), Encounter for blood transfusion, Essential hypertension (2019), Former smoker (2015), Healed ulcer of left foot on examination (2017), Hematuria, Hydrocele, Hyperphosphatemia, Hypoalbuminemia (2019), Hypocalcemia, Lymphedema of both lower extremities (2019), Mixed hyperlipidemia (2015), Morbid obesity with BMI of 50.0-59.9, adult (2015), Obstruction of right ureteropelvic junction (UPJ) due to stone (2021), Onychomycosis of multiple toenails with type 2 diabetes mellitus and peripheral neuropathy (2017), Perianal cyst, Proteinuria, Pseudocyst of pancreas (2019), Skin cancer, Sleep apnea (2015), Status post bariatric surgery (2016), Type 2 diabetes mellitus, with long-term current use of insulin (2015), and Urinary tract infection. Patient relates no major problem with feet. Only complaints today consist of blister to bottom of his left foot. Relates both of his dogs  over the past  week and he has been on his feet a lot. No further pedal complaints. Has plan to travel to [facility] over weekend with his wife for their anniversary.    8/15/23: Seen today, relates he did not go to the [facility], he decided to stay home and elevate his feet. Relates his left leg was itching badly in previous dressing and has new wound to left anterior leg where he scratched. No further pedal complaints. Denies signs of infection. Relates starts in lymphedema clinic in 2 weeks.     9/12/23: Seen today, all wounds are healed. He continues to follow up in lymphedema clinic.     6/26/24: Seen today for annual diabetic foot exam. History of right foot wound which is healed on exam. No further pedal complaints    11/26/24: Seen today for left 2nd toe wound. Relates it started after a cruise, around 1-2 weeks ago. Relates to pain, malodor to toe. Has been having nausea/vomiting recently. Sister relates a spider bit his leg on the cruise ship. No further pedal complaints.     PCP: Leon Barajas, DO    Date Last Seen by PCP:     Current shoe gear: Casual shoes    Hemoglobin A1C   Date Value Ref Range Status   11/15/2024 7.9 (H) 4.0 - 5.6 % Final     Comment:     ADA Screening Guidelines:  5.7-6.4%  Consistent with prediabetes  >or=6.5%  Consistent with diabetes    High levels of fetal hemoglobin interfere with the HbA1C  assay. Heterozygous hemoglobin variants (HbS, HgC, etc)do  not significantly interfere with this assay.   However, presence of multiple variants may affect accuracy.     05/22/2024 7.2 (H) 4.0 - 5.6 % Final     Comment:     ADA Screening Guidelines:  5.7-6.4%  Consistent with prediabetes  >or=6.5%  Consistent with diabetes    High levels of fetal hemoglobin interfere with the HbA1C  assay. Heterozygous hemoglobin variants (HbS, HgC, etc)do  not significantly interfere with this assay.   However, presence of multiple variants may affect accuracy.     01/18/2024 6.7 (H) 4.0 - 5.6 % Final     Comment:      ADA Screening Guidelines:  5.7-6.4%  Consistent with prediabetes  >or=6.5%  Consistent with diabetes    High levels of fetal hemoglobin interfere with the HbA1C  assay. Heterozygous hemoglobin variants (HbS, HgC, etc)do  not significantly interfere with this assay.   However, presence of multiple variants may affect accuracy.           Review of Systems   Constitutional: Negative for chills, decreased appetite, diaphoresis and fever.   HENT:  Negative for congestion and hearing loss.    Cardiovascular:  Negative for chest pain, claudication and syncope.   Respiratory:  Negative for cough and shortness of breath.    Skin:  Positive for color change, dry skin, nail changes and poor wound healing. Negative for flushing, itching and rash.   Musculoskeletal:  Negative for back pain.   Gastrointestinal:  Negative for nausea and vomiting.   Neurological:  Positive for numbness and paresthesias. Negative for focal weakness and weakness.   Psychiatric/Behavioral:  Negative for altered mental status. The patient is not nervous/anxious.         Objective:     Physical Exam  Constitutional:       General: He is not in acute distress.     Appearance: Normal appearance. He is well-developed. He is not diaphoretic.   Cardiovascular:      Comments: Dorsalis pedis and posterior tibial pulses are mildly diminished. Skin temperature is within normal limits. Toes are cool to touch and feet are warm proximally. Hair growth is diminished. Skin is mildly atrophic and with mild hyperpigmentation.lymphedema noted, bilaterally.   Musculoskeletal:         General: No tenderness.      Comments: Adequate joint range of motion without pain, limitation, nor crepitation to foot and ankle joints. Muscle strength is 5/5 in all groups bilaterally.       Feet:      Right foot:      Skin integrity: Dry skin present. No ulcer, blister, erythema or warmth.      Left foot:      Skin integrity: Dry skin present. No ulcer, blister, erythema or warmth.    Skin:     General: Skin is warm and dry.      Capillary Refill: Capillary refill takes less than 2 seconds.      Comments: Skin is warm and dry, no acute signs of infection noted bilaterally      Toenails are well trimmed and of normal morphology    See wound description below    Otherwise, no open wounds, macerations or hyperkeratotic lesions, bilaterally            Neurological:      Mental Status: He is alert and oriented to person, place, and time.      Sensory: Sensory deficit present.      Motor: No abnormal muscle tone.      Comments: Light touch within normal limits. [place] 5.07 monofilamant testing is diminished. Vibratory sensation is diminished bilaterally.   Psychiatric:         Mood and Affect: Mood normal.         Behavior: Behavior normal.         Thought Content: Thought content normal.       Left 2nd toe with distal gangrenous changes and cellulitis extending to midfoot. Malodorous.             Assessment:      Encounter Diagnoses   Name Primary?    Cellulitis, unspecified cellulitis site Yes    Gangrene            Plan:     Jian was seen today for foot problem and wound care.    Diagnoses and all orders for this visit:    Cellulitis, unspecified cellulitis site    Gangrene            I counseled the patient on his conditions, their implications and medical management.  Site dressed with betadine and football of cast padding and secured with flexinet  Patient sent to ED to be admitted for cellulitis and gangrene of left 2nd toe. To be admitted to Hospital Medicine for IV antibiotics. Will need xray, MRI. Please consult Infectious Disease, Interventional Cardiology and Podiatry  Will follow

## 2024-11-26 NOTE — ED NOTES
Pt presents to the ED from podiatry with a complaint of gangrene to his L second toe.  Podiatry requesting imaging, blood work, and admission for antibiotics.

## 2024-11-26 NOTE — PLAN OF CARE
2 cc of air removed from R radial vasc band. No hematoma or bleeding noted. +2 marysol radial pulses palpated. Skin is normal in color, warm to touch, < 3 sec cap refill. VSS. Will continue to monitor pt for safety and needs.

## 2024-11-26 NOTE — H&P (VIEW-ONLY)
[facility] Consult Note     Attending Physician: Sharifa Duque MD  Cardiology Attending Physician: Ramírez Mccullough MD  Date of Admit: 11/26/2024  Reason for Consult:  Critical limb ischemia      History of Present Illness:       Jian Arrieta is a pleasant 69 y.o. male with PMH noted below in A/P on a cruise about 2 weeks ago when he noted his left 2nd toe was bruised appearing.  Noted red blue discoloration.  At the same time does endorse several weeks of left lower extremity leg discomfort below the knee, described as achy/throbbing.  Additionally does endorse some chills however no recorded fevers.  Patient says he had a spider bite on his left arm however no bites on his left lower extremity.  enies any acute trauma to the affected foot.  Seen in podiatry clinic today, found to have gangrene and cellulitis of the left lower extremity.      History obtained by patient interview and supplemented by nursing documentation and chart review.   Objective   PMHx:  has a past medical history of Alcohol abuse, Anasarca (1/28/2019), Anemia, Anticoagulant long-term use, Arthropathy associated with neurological disorder (9/2/2015), Atherosclerosis, Charcot foot due to diabetes mellitus, Chronic combined systolic and diastolic heart failure (01/29/2019), Chronic pancreatitis (1/28/2019), CKD (chronic kidney disease) stage 3, GFR 30-59 ml/min, CKD (chronic kidney disease) stage 4, GFR 15-29 ml/min, Colon polyps, Coronary artery disease due to calcified coronary lesion (05/08/2015), Diabetic polyneuropathy associated with type 2 diabetes mellitus (9/2/2015), Diverticulosis (1/28/2019), DM type 2 with diabetic peripheral neuropathy (2/4/2019), Encounter for blood transfusion, Essential hypertension (1/28/2019), Former smoker (8/26/2015), Healed ulcer of left foot on examination (6/20/2017), Hematuria, Hydrocele, Hyperphosphatemia, Hypoalbuminemia (2/4/2019), Hypocalcemia, Lymphedema of both lower extremities  (1/29/2019), Mixed hyperlipidemia (5/8/2015), Morbid obesity with BMI of 50.0-59.9, adult (5/8/2015), Obstruction of right ureteropelvic junction (UPJ) due to stone (5/24/2021), Onychomycosis of multiple toenails with type 2 diabetes mellitus and peripheral neuropathy (6/20/2017), Perianal cyst, Proteinuria, Pseudocyst of pancreas (1/28/2019), Skin cancer, Sleep apnea (8/26/2015), Status post bariatric surgery (1/11/2016), Type 2 diabetes mellitus, with long-term current use of insulin (5/8/2015), and Urinary tract infection.   SurgHx:  has a past surgical history that includes Angioplasty; Cyst Removal; perianal surgery; Colonoscopy (N/A, 10/6/2015); Knee arthroscopy; Gastrectomy; Coronary artery bypass graft (2017); Coronary angiography (Right, 3/20/2019); Coronary bypass graft angiography (3/20/2019); Insertion of dialysis catheter (N/A, 5/17/2019); Angiography (N/A, 6/28/2019); Esophagogastroduodenoscopy (N/A, 7/8/2019); Endoscopic ultrasound of upper gastrointestinal tract (N/A, 2/26/2020); Laparoscopic cholecystectomy (N/A, 5/4/2020); Liver biopsy (N/A, 5/4/2020); Esophagogastroduodenoscopy (N/A, 5/13/2021); Colonoscopy (N/A, 5/13/2021); Excision of hydrocele (Bilateral, 6/16/2021); Cystoscopy w/ ureteral stent placement (Right, 6/16/2021); Fluoroscopy (N/A, 6/16/2021); Ureteroscopy (Right, 6/30/2021); Laser lithotripsy (N/A, 6/30/2021); Pyeloscopy (Right, 6/30/2021); Cystoscopy (Right, 6/30/2021); Replacement of stent (Right, 6/30/2021); Cystoscopy (N/A, 10/16/2023); Retrograde pyelography (Right, 10/16/2023); Ureteral stent placement (Right, 10/16/2023); Ureteroscopy (Right, 10/16/2023); Pyeloscopy (Right, 10/16/2023); Cystoscopy (N/A, 12/6/2023); Ureteroscopy (Right, 12/6/2023); removal-stent (Right, 12/6/2023); Retrograde pyelography (Right, 12/6/2023); extraction - stone (Right, 12/6/2023); and Pyeloscopy (Right, 12/6/2023).   FamHx: family history includes Cancer in his brother, father, mother, and  "paternal grandfather; Diabetes in his maternal grandmother; Heart disease in his father; No Known Problems in his paternal grandmother; Obesity in his sister; Parkinsonism in his brother; Stroke in his maternal grandfather.   SocialHx:  reports that he quit smoking about 19 years ago. His smoking use included cigarettes. He started smoking about 49 years ago. He has a 60 pack-year smoking history. He has never used smokeless tobacco. He reports that he does not drink alcohol and does not use drugs.  Home Meds:  Current Outpatient Medications   Medication Instructions    acetaminophen (TYLENOL) 650 mg, Oral, Every 8 hours PRN    amLODIPine (NORVASC) 10 mg, Oral, Daily    aspirin (ECOTRIN) 81 mg, Oral, Daily    blood pressure monitor Kit 1 kit, Misc.(Non-Drug; Combo Route), 2 times daily    blood sugar diagnostic (ONETOUCH VERIO TEST STRIPS) Strp 1 each, Misc.(Non-Drug; Combo Route), 2 times daily    blood-glucose sensor (DEXCOM G6 SENSOR) Sandi 1 each, Subcutaneous, Every 10 days    blood-glucose transmitter (DEXCOM G6 TRANSMITTER) Sandi 1 each, Subcutaneous, Every 10 days    glucagon (BAQSIMI) 3 mg/actuation Spry 1 each, Nasal, Daily PRN    HumaLOG KwikPen Insulin 5 Units, Subcutaneous, 3 times daily before meals PRN, This is emergency back up insulin to use with meals if OFF of insulin pump    hydrALAZINE (APRESOLINE) 25 mg, Oral, Every 8 hours    insulin detemir U-100 (Levemir) 30 Units, Subcutaneous, Nightly, This is emergency back up insulin only if OFF of your insulin pump    insulin pump cart,automated,BT (OMNIPOD 5 G6 PODS, GEN 5,) Crtg INJECT 1 EACH INTO THE SKIN EVERY OTHER DAY.    lancets (ONETOUCH DELICA PLUS LANCET) 33 gauge Misc 1 lancet , Misc.(Non-Drug; Combo Route), 2 times daily    LYUMJEV U-100 INSULIN 80 Units, Subcutaneous, Continuous, Uses up to 80 units daily with omnipod 5 insulin pump as directed.    pen needle, diabetic 32 gauge x 5/32" Ndle Use with toujeo insulin one daily only as Emergency " "use/back up insulin - if OFF OF your insulin pump.    rosuvastatin (CRESTOR) 20 mg, Daily    torsemide (DEMADEX) 20 mg, Oral, Daily     Review of Systems: Comprehensive ROS was performed and is negative unless otherwise noted in HPI.     Objective:   Last 24 Hour Vital Signs:  BP  Min: 115/73  Max: 153/71  Temp  Av.4 °F (36.9 °C)  Min: 98.4 °F (36.9 °C)  Max: 98.4 °F (36.9 °C)  Pulse  Av.7  Min: 82  Max: 84  Resp  Av  Min: 16  Max: 16  SpO2  Av %  Min: 96 %  Max: 96 %  Height  Av' 7" (170.2 cm)  Min: 5' 7" (170.2 cm)  Max: 5' 7" (170.2 cm)  Weight  Av.5 kg (270 lb)  Min: 122.5 kg (270 lb)  Max: 122.5 kg (270 lb)  No intake/output data recorded.  Physical Examination:  Constitutional: NAD, Atraumatic   HEENT: MMM  Cardiovascular: RRR, no murmurs noted, no chest tenderness to palpation, 2+ radial pulses b/l  Pulmonary: normal respiratory effort, CTAB, no crackles, wheezes  Abdominal: S/NT/ND  Musculoskeletal:  Bilateral edema, gangrene of left 2nd toe  Neurological: Alert & oriented to self, location, time and situation. No gross neurological deficits  Per below  Psych: Appropriate affect, normal mood+      Laboratory:  Personally reviewed    Other Results:  TTE:  Results for orders placed during the hospital encounter of 10/12/23    Echo    Interpretation Summary    Left Ventricle: The left ventricle is normal in size. Normal wall thickness. Septal motion is consistent with post-operative status. There is low normal systolic function with a visually estimated ejection fraction of 50 - 55%. There is normal diastolic function.    Right Ventricle: Normal right ventricular cavity size. Wall thickness is normal. Right ventricle wall motion  is normal. Systolic function is normal.    Pulmonary Artery: The estimated pulmonary artery systolic pressure is 21 mmHg.    IVC/SVC: Normal venous pressure at 3 mmHg.    Stress Testing:   No results found for this or any previous visit.     Coronary " Angiogram:  No results found for this or any previous visit.    Ultrasound arterial left leg 11/26/2024  FINDINGS:  Peak systolic velocities were obtained as follows (in cm/sec):     Left common femoral:  155, monophasic waveform     Left deep femoral:  77, biphasic waveform     Left superficial femoral proximal: 209, monophasic waveform     Left superficial femoral mid: 132, monophasic waveform     Left superficial femoral distal: 122, monophasic waveform     Left proximal popliteal:  7, monophasic waveform     Left distal popliteal:  100, monophasic waveform     Left anterior tibial:  155, monophasic waveform proximally. Mid and distal aspects without definite flow. Collaterals are suggested in the region.     Left posterior tibial:  178, monophasic waveform proximally. Mid and distal aspects without definite flow.        Lack of flow involving the mid and distal aspects of the left anterior and posterior tibial arteries.     Remaining interrogated vessels appear patent, with waveform morphology suggesting peripheral arterial disease and or proximal steno-occlusive disease.    32 minutes were spent on this visit including time personally:  -Reviewing Medical records & lab results  -Independently reviewing and interpreting (if not documented by myself) EKGs, echocardiograms, catherizations   -Obtaining a history, performing a relevant exam, counseling/educating the patient/family  -Documenting clinical information in the EMR including ordering of tests  -Care coordination and communicating with other health care providers             Assessment/Plan:     Active Hospital Problems    Diagnosis    *Critical limb ischemia of left lower extremity    Gangrene of toe of left foot    Stage 4 chronic kidney disease    Chronic combined systolic and diastolic congestive heart failure    Hx of CABG    Other cirrhosis of liver    Paroxysmal atrial fibrillation    Status post bariatric surgery    Sleep apnea    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 male with a PMHx  pAF(not on AC per patient preference) CAD s/p PCI (x5) with subsequent CABG x3v (~2017 at ; LIMA-LAD [patent], SVG-OM [patent], SVG-RCA [occluded; L->R collaterals from LAD to PDA 7/2024 [facility]]), HFmrEF(EF 40-45 7/14/2024 at ), CKD (IV),HLD, DM2, BERHANE on CPAP, R knee OA, Charcot foot, normocytic anemia, recurrent ascites (CT with findings of cirrhosis but biopsy negative; has large pancreatic pseudocyst) due to portal vein thrombosis s/p angioplasty, aortic atherosclerosis (CT), Venous insufficiency (previously in lymphedema clinic) presents with CLI of left 2nd toe as per above.  Podiatry and ID following his inpatient.  Given Zosyn in the ED. vital signs stable, creatinine elevated, Doppler ultrasounds of the venous system without DVT, and arterial with concern for significant disease of the left lower extremity.    -Keep NPO  -Plan for LLE angiogram (w/ C02, minimal contrast) for assessment of CLI  -continue home p.o. Lasix 80 mg daily  -continue home metoprolol 50 XL, rosuvastatin 40, Imdur 30  -continue asa 81mg daily, plavix 75mg daily due to recent NSTEMI at [facility] 07/14/2024    Thank you for the opportunity to care for this patient. Please don't hesitate to reach out with any questions/concerns,      Ramírez Mccullough MD  Interventional Cardiology  [facility] [place]

## 2024-11-26 NOTE — PROGRESS NOTES
"Pharmacokinetic Initial Assessment: IV Vancomycin    Assessment/Plan:    Initiate intravenous vancomycin with loading dose of 2000 mg once with subsequent doses when random concentrations are less than 20 mcg/mL  Desired empiric serum trough concentration is 10 to 15 mcg/mL  Draw vancomycin random level on 11/27 at 1600.  Pharmacy will continue to follow and monitor vancomycin.      Please contact pharmacy at extension [phone] with any questions regarding this assessment.     Thank you for the consult,   Toya Severino       Patient brief summary:  Jian Arrieta is a 69 y.o. male initiated on antimicrobial therapy with IV Vancomycin for treatment of suspected skin & soft tissue infection    Drug Allergies:   Review of patient's allergies indicates:  No Known Allergies    Actual Body Weight:   122.5 kg     Renal Function:   Estimated Creatinine Clearance: 25.4 mL/min (A) (based on SCr of 3.4 mg/dL (H)).,       CBC (last 72 hours):  Recent Labs   Lab Result Units 11/26/24  1001   WBC K/uL 11.23   Hemoglobin g/dL 11.6*   Hematocrit % 35.6*   Platelets K/uL 232   Gran % % 78.8*   Lymph % % 11.1*   Mono % % 8.5   Eosinophil % % 0.5   Basophil % % 0.3   Differential Method  Automated       Metabolic Panel (last 72 hours):  Recent Labs   Lab Result Units 11/26/24  1001   Sodium mmol/L 136   Potassium mmol/L 4.3   Chloride mmol/L 101   CO2 mmol/L 28   Glucose mg/dL 172*   BUN mg/dL 46*   Creatinine mg/dL 3.4*   Albumin g/dL 2.1*   Total Bilirubin mg/dL 0.5   Alkaline Phosphatase U/L 132   AST U/L 26   ALT U/L 13       Drug levels (last 3 results):  No results for input(s): "VANCOMYCINRA", "VANCORANDOM", "VANCOMYCINPE", "VANCOPEAK", "VANCOMYCINTR", "VANCOTROUGH" in the last 72 hours.    Microbiologic Results:  Microbiology Results (last 7 days)       Procedure Component Value Units Date/Time    Blood culture #2 **CANNOT BE ORDERED STAT** [[id number]] Collected: 11/26/24 1001    Order Status: Sent Specimen: Blood from " Peripheral, Antecubital, Right     Blood culture #1 **CANNOT BE ORDERED STAT** [[id number]]     Order Status: Sent Specimen: Blood

## 2024-11-26 NOTE — HPI
69M with a Pmhc of CKD4, CAD, HF, sleep apnea who was admitted and Podiatry was consulted for CLI of LLE. Patient say the blackness of L toe started about 2 weeks ago. He denies any traum to his L foot. Denies any drainage from his foot. Denies any pain. He reports having nausea and chill but no fevers or chills. Patient said he was bit by a spider on his L arm during his cruise last week. He was put on doxycycline abx which his L arm spider bite has greatly improved. He reports that the b/l leg celluilitus and swelling has been a chronic problem. He reports wearing compression stockings and keeping his legs ellevated. Denies SOB or chest pain. Denies any other pedal complaints.     VSS, afebirle, WBC WNL, CRP 88, . XR L foot shows no OM or gas. US LE veins shows no DVT. US LE arterial shows lack of flow involving the mid and distal aspects of the L anterior and posterior tibial arteries. Remaining interrogated vessels appear patent, with waveform morphology suggesting PAD and or proximal steno-occlusive disease.

## 2024-11-26 NOTE — DISCHARGE INSTRUCTIONS
Discharge Instructions:     Do not drive a car, operate heavy equipment, care for a young child, etc for the next 12-24 hours.   Avoid drinking alcohol for 24 hours.  Do not make any important decisions for 24 hours.     Drink fluids to keep hydrated. Resume your usual diet as tolerated.      Rest for today then activity as tolerated.   Do not lift anything over 5 pounds for the first 3 days after procedure.     Remove dressing tomorrow, 11/27/24 after 5 pm then may shower with warm soapy water. Do not scrub site. Pat dry.   May apply bandaid for 2 days.  No tub baths.  Do not submerge wound in water for 3 days.      Call MD for any unrelieved pain, excessive nausea or vomiting, redness around site, bleeding, or pus or foul smelling drainage, or any other questions or concerns.     Go to the ER for any difficulty breathing or chest pain.        If site swells or bleeds, hold direct pressure to area for 10 full minutes.   If site continues to bleed, continue to hold pressure to site and have someone bring you to the ER.       Follow any additional instructions given to you by MD.      Call MD to schedule a follow up appointment, or follow up as instructed.         Last Modified by Qian Gillis RN at 6/1/22 3304

## 2024-11-26 NOTE — ED NOTES
Dr. Foy made aware of difficulty to obtain second set of blood cultures.  Per Dr. Foy, it is okay to start the IV antibiotics.

## 2024-11-26 NOTE — ASSESSMENT & PLAN NOTE
Assesment  Erythema and swelling to b/l legs consistant with SSTI cellulitus. Dry gangrene to L 2nd digit. No masceration, drainage, crepitus, or fluctuance noted. Hendricks grade 4. XR L foot shows unremarkable. US LE veins shows no DVT. US LE arterial shows lack of flow involving the mid and distal aspects of the L anterior and posterior tibial arteries. Remaining interrogated vessels appear patent, with waveform morphology suggesting PAD and or proximal steno-occlusive disease.    Plan  -Physical exam and imaging findings discussed with the patient. Disscused with patient that b/l legs consitant with cellulitus and dry stable gangrene to L 2nd digit.  -No surgical intervention at this time. Recommend continuing with abx and wound care.  -Abx per Primary  -Cardiogly consulted, recs appreciated  -Interventional Cardiology consulted, recs appreciated. Plan for LLE angiogram  -Advised patient to keep b/l LE elevated and compression for LE swelling  -L 2nd digit painted with betadine and left open to air dry  -Wound care orders placed  -WBAT  -Podiatry will continue to follow

## 2024-11-26 NOTE — ED PROVIDER NOTES
Encounter Date: 11/26/2024       History     Chief Complaint   Patient presents with    Wound Check     Pt presents in ED from podiatry dx gangrene ulcer to L 2nd toe.     69-year-old male with significant history of diabetes, CKD, pancreatitis, paroxysmal AFib on Eliquis?, CAD s/p CABG, presents to ED by request of his podiatrist, Dr. Felton , with concern of left 2nd toe gangrene.  Patient reports he noticed toe appearing black roughly 2 days ago.  Denying any pain symptoms.  He was report over past 2 weeks he has had intermittent nausea with vomiting with chills but no fever.  Denying any associated abdominal pain, urinary or bowel complaints.  No chest pain, cough, shortness of breath, URI like symptoms.  No other acute complaints at this time Dr. Felton requesting total imaging, IV antibiotics, no extremity venous and arterial ultrasounds with plan to admit with Podiatry consult.    The history is provided by the patient.     Review of patient's allergies indicates:  No Known Allergies  Past Medical History:   Diagnosis Date    Alcohol abuse     Anasarca 1/28/2019    Anemia     Anticoagulant long-term use     Arthropathy associated with neurological disorder 9/2/2015    Atherosclerosis     Charcot foot due to diabetes mellitus     Chronic combined systolic and diastolic heart failure 01/29/2019 1-28-19 Left VentricleModerate decreased ejection fraction at 30%. Normal left ventricular cavity size. Normal wall thickness observed. Grade I (mild) left ventricular diastolic dysfunction consistent with impaired relaxation. Normal left atrial pressure. Moderate, global hypokinesis(see wall scoring diagram). Right VentricleNormal cavity size, wall thickness and ejection fraction. Wall motion n    Chronic pancreatitis 1/28/2019    CKD (chronic kidney disease) stage 3, GFR 30-59 ml/min     CKD (chronic kidney disease) stage 4, GFR 15-29 ml/min     Colon polyps     approx 5 yrs ago    Coronary artery disease due to  calcified coronary lesion 05/08/2015    5 stents on ASA      Diabetic polyneuropathy associated with type 2 diabetes mellitus 9/2/2015    Diverticulosis 1/28/2019    DM type 2 with diabetic peripheral neuropathy 2/4/2019    Encounter for blood transfusion     Essential hypertension 1/28/2019    Former smoker 8/26/2015    Healed ulcer of left foot on examination 6/20/2017    Hematuria     Hydrocele     approx 1.5 yrs ago    Hyperphosphatemia     Hypoalbuminemia 2/4/2019    Hypocalcemia     Lymphedema of both lower extremities 1/29/2019    Mixed hyperlipidemia 5/8/2015    Morbid obesity with BMI of 50.0-59.9, adult 5/8/2015    Obstruction of right ureteropelvic junction (UPJ) due to stone 5/24/2021    Onychomycosis of multiple toenails with type 2 diabetes mellitus and peripheral neuropathy 6/20/2017    Perianal cyst     approx 2 yrs ago    Proteinuria     Pseudocyst of pancreas 1/28/2019 1-28-19 Liver has a cirrhotic morphology with no focal lesions.  Significant interval increase in ascites when compared to prior exam which may account for patient's abdominal distension.  Hypodense air-fluid collection along the body of the pancreas which is slightly smaller when compared to prior CT.  Findings may relate to pancreatic necrosis with pancreatic pseudocysts with infected pseudocyst    Skin cancer     skin cancer    Sleep apnea 8/26/2015    Status post bariatric surgery 1/11/2016    Type 2 diabetes mellitus, with long-term current use of insulin 5/8/2015    Urinary tract infection      Past Surgical History:   Procedure Laterality Date    ANGIOGRAPHY N/A 6/28/2019    Procedure: ANGIOGRAM-PV STENT;  Surgeon: Ewa Diagnostic Provider;  Location: [facility] OR;  Service: Radiology;  Laterality: N/A;    ANGIOPLASTY      total x5 stents    COLONOSCOPY N/A 10/6/2015    Procedure: COLONOSCOPY;  Surgeon: Shekhar Richards MD;  Location: [facility] ENDO (2ND FLR);  Service: Endoscopy;  Laterality: N/A;  BMI over 55/2nd floor case    5 day hold  Plavix, Dr Kwadwo Arroyo    COLONOSCOPY N/A 5/13/2021    Procedure: COLONOSCOPY;  Surgeon: Huan Brumfield MD;  Location: [facility];  Service: Endoscopy;  Laterality: N/A;    CORONARY ANGIOGRAPHY Right 3/20/2019    Procedure: ANGIOGRAM, CORONARY ARTERY;  Surgeon: Bob Duque MD;  Location: [facility] CATH LAB;  Service: Cardiology;  Laterality: Right;    CORONARY ARTERY BYPASS GRAFT  2017    x3    CORONARY BYPASS GRAFT ANGIOGRAPHY  3/20/2019    Procedure: Bypass graft study;  Surgeon: Bob Duque MD;  Location: [facility] CATH LAB;  Service: Cardiology;;    CYST REMOVAL      CYSTOSCOPY Right 6/30/2021    Procedure: CYSTOSCOPY;  Surgeon: William Diaz MD;  Location: [facility] OR 1ST FLR;  Service: Urology;  Laterality: Right;    CYSTOSCOPY N/A 10/16/2023    Procedure: CYSTOSCOPY;  Surgeon: Chrystal Dias MD;  Location: [facility] OR 1ST FLR;  Service: Urology;  Laterality: N/A;    CYSTOSCOPY N/A 12/6/2023    Procedure: CYSTOSCOPY;  Surgeon: William Diaz MD;  Location: [facility] OR 1ST FLR;  Service: Urology;  Laterality: N/A;    CYSTOSCOPY W/ URETERAL STENT PLACEMENT Right 6/16/2021    Procedure: CYSTOSCOPY, WITH URETERAL STENT INSERTION;  Surgeon: William Diaz MD;  Location: [facility] OR 2ND FLR;  Service: Urology;  Laterality: Right;  FLUORO LESS THAN 1 HOUR    ENDOSCOPIC ULTRASOUND OF UPPER GASTROINTESTINAL TRACT N/A 2/26/2020    Procedure: ULTRASOUND, UPPER GI TRACT, ENDOSCOPIC;  Surgeon: Robbie Yang MD;  Location: [facility];  Service: Endoscopy;  Laterality: N/A;    ESOPHAGOGASTRODUODENOSCOPY N/A 7/8/2019    Procedure: ESOPHAGOGASTRODUODENOSCOPY (EGD);  Surgeon: Huan Brumfield MD;  Location: [facility];  Service: Endoscopy;  Laterality: N/A;    ESOPHAGOGASTRODUODENOSCOPY N/A 5/13/2021    Procedure: EGD (ESOPHAGOGASTRODUODENOSCOPY);  Surgeon: Huan Brumfield MD;  Location: [facility];  Service: Endoscopy;  Laterality: N/A;    EXCISION OF HYDROCELE Bilateral 6/16/2021    Procedure: HYDROCELE REPAIR;   Surgeon: William Diaz MD;  Location: [facility] OR 2ND FLR;  Service: Urology;  Laterality: Bilateral;  2 HOURS    EXTRACTION - STONE Right 12/6/2023    Procedure: EXTRACTION - STONE;  Surgeon: William Diaz MD;  Location: [facility] OR 1ST FLR;  Service: Urology;  Laterality: Right;    FLUOROSCOPY N/A 6/16/2021    Procedure: FLUOROSCOPY;  Surgeon: William Diaz MD;  Location: [facility] OR 2ND FLR;  Service: Urology;  Laterality: N/A;    GASTRECTOMY      INSERTION OF DIALYSIS CATHETER N/A 5/17/2019    Procedure: pleurx;  Surgeon: [facility] Diagnostic Provider;  Location: [facility] OR 2ND FLR;  Service: General;  Laterality: N/A;  Room 188/[facility]    KNEE ARTHROSCOPY      LAPAROSCOPIC CHOLECYSTECTOMY N/A 5/4/2020    Procedure: CHOLECYSTECTOMY, LAPAROSCOPIC;  Surgeon: SON Rowe MD;  Location: [facility] OR;  Service: General;  Laterality: N/A;    LASER LITHOTRIPSY N/A 6/30/2021    Procedure: LITHOTRIPSY, USING LASER;  Surgeon: William Diaz MD;  [facility];  Service: Urology;  Laterality: N/A;    LIVER BIOPSY N/A 5/4/2020    Procedure: BIOPSY, LIVER, Laproscopic ;  Surgeon: SON Rowe MD;  Location: [facility] OR;  Service: General;  Laterality: N/A;    perianal surgery      perianal cyst removed    PYELOSCOPY Right 6/30/2021    Procedure: PYELOSCOPY;  Surgeon: William Diaz MD;  [facility];  Service: Urology;  Laterality: Right;    PYELOSCOPY Right 10/16/2023    Procedure: PYELOSCOPY;  Surgeon: Chrystal Dias MD;  [facility];  Service: Urology;  Laterality: Right;    PYELOSCOPY Right 12/6/2023    Procedure: PYELOSCOPY;  Surgeon: William Diaz MD;  Location: [facility] OR 1ST FLR;  Service: Urology;  Laterality: Right;    REMOVAL-STENT Right 12/6/2023    Procedure: REMOVAL-STENT;  Surgeon: William Diaz MD;  [facility];  Service: Urology;  Laterality: Right;    REPLACEMENT OF STENT Right 6/30/2021    Procedure: REPLACEMENT, STENT;  Surgeon: William Diaz MD;   Location: [facility] OR 1ST FLR;  Service: Urology;  Laterality: Right;    RETROGRADE PYELOGRAPHY Right 10/16/2023    Procedure: PYELOGRAM, RETROGRADE;  Surgeon: Chrystal Dias MD;  Location: [facility] OR 1ST FLR;  Service: Urology;  Laterality: Right;    RETROGRADE PYELOGRAPHY Right 2023    Procedure: PYELOGRAM, RETROGRADE;  Surgeon: William iDaz MD;  Location: [facility] OR 1ST FLR;  Service: Urology;  Laterality: Right;    URETERAL STENT PLACEMENT Right 10/16/2023    Procedure: INSERTION, STENT, URETER;  Surgeon: Chrystal Dias MD;  Location: [facility] OR 1ST FLR;  Service: Urology;  Laterality: Right;    URETEROSCOPY Right 2021    Procedure: URETEROSCOPY FLEXIBLE URETEROSCOPE;  Surgeon: William Diaz MD;  Location: [facility] OR [facility] FLR;  Service: Urology;  Laterality: Right;    URETEROSCOPY Right 10/16/2023    Procedure: URETEROSCOPY;  Surgeon: Chrystal Dias MD;  Location: [facility] OR [facility] FLR;  Service: Urology;  Laterality: Right;    URETEROSCOPY Right 2023    Procedure: URETEROSCOPY;  Surgeon: William Diaz MD;  [facility];  Service: Urology;  Laterality: Right;     Family History   Problem Relation Name Age of Onset    Cancer Mother      Cancer Father      Heart disease Father      Obesity Sister      Parkinsonism Brother      No Known Problems Paternal Grandmother      Cancer Paternal Grandfather      Cancer Brother      Diabetes Maternal Grandmother      Stroke Maternal Grandfather      Cirrhosis Neg Hx       Social History     Tobacco Use    Smoking status: Former     Current packs/day: 0.00     Average packs/day: 2.0 packs/day for 30.0 years (60.0 ttl pk-yrs)     Types: Cigarettes     Start date: 1975     Quit date: 2005     Years since quittin.8    Smokeless tobacco: Never   Substance Use Topics    Alcohol use: No     Comment: started ~, reports 1 shot daily, max 3 shots daily, vague about alcohol consumption. Last drink 2018    Drug use: No     Review  of Systems   Constitutional:  Positive for chills. Negative for activity change and fever.   HENT:  Negative for congestion and sore throat.    Eyes:  Negative for visual disturbance.   Respiratory:  Negative for cough and shortness of breath.    Cardiovascular:  Negative for chest pain.   Gastrointestinal:  Positive for nausea and vomiting. Negative for abdominal pain and diarrhea.   Genitourinary:  Negative for dysuria and flank pain.   Musculoskeletal:  Negative for myalgias.   Skin:  Positive for color change.       Physical Exam     Initial Vitals [11/26/24 0848]   BP Pulse Resp Temp SpO2   139/69 82 16 98.4 °F (36.9 °C) 96 %      MAP       --         Physical Exam    Vitals reviewed.  Constitutional: He appears well-developed and well-nourished. He is Obese . He is active. He does not have a sickly appearance. He does not appear ill. No distress.   HENT:   Head: Normocephalic and atraumatic.   Neck:   Normal range of motion.  Cardiovascular:  Normal rate.           Pulmonary/Chest: Effort normal.   Abdominal: Abdomen is soft. There is no abdominal tenderness.   Musculoskeletal:      Cervical back: Normal range of motion.      Comments: Left 2nd toe distal phalanx gangrene.  Surrounding erythema and cellulitic changes with mild warmth to touch.  DP pulse palpable bilaterally.  BLE lymphedema at reported baseline     Neurological: He is alert. GCS eye subscore is 4. GCS verbal subscore is 5. GCS motor subscore is 6.   Skin: Skin is warm and dry.   Psychiatric: He has a normal mood and affect. His speech is normal and behavior is normal.         ED Course   Procedures  Labs Reviewed   CBC W/ AUTO DIFFERENTIAL - Abnormal       Result Value    WBC 11.23      RBC 3.93 (*)     Hemoglobin 11.6 (*)     Hematocrit 35.6 (*)     MCV 91      MCH 29.5      MCHC 32.6      RDW 13.2      Platelets 232      MPV 9.8      Immature Granulocytes 0.8 (*)     Gran # (ANC) 8.8 (*)     Immature Grans (Abs) 0.09 (*)     Lymph # 1.3       Mono # 1.0      Eos # 0.1      Baso # 0.03      nRBC 0      Gran % 78.8 (*)     Lymph % 11.1 (*)     Mono % 8.5      Eosinophil % 0.5      Basophil % 0.3      Differential Method Automated     COMPREHENSIVE METABOLIC PANEL - Abnormal    Sodium 136      Potassium 4.3      Chloride 101      CO2 28      Glucose 172 (*)     BUN 46 (*)     Creatinine 3.4 (*)     Calcium 8.4 (*)     Total Protein 7.2      Albumin 2.1 (*)     Total Bilirubin 0.5      Alkaline Phosphatase 132      AST 26      ALT 13      eGFR 19 (*)     Anion Gap 7 (*)    C-REACTIVE PROTEIN - Abnormal    CRP 88.1 (*)    LIPASE - Abnormal    Lipase <3 (*)    POCT GLUCOSE - Abnormal    POCT Glucose 182 (*)    CULTURE, BLOOD   CULTURE, BLOOD        ECG Results              EKG 12-lead (In process)        Collection Time Result Time QRS Duration OHS QTC Calculation    11/26/24 09:55:00 11/26/24 09:49:56 146 499                     In process by Interface, Lab In [facility] (11/26/24 09:50:03)                   Narrative:    Test Reason : I96,    Vent. Rate :  78 BPM     Atrial Rate :  78 BPM     P-R Int : 182 ms          QRS Dur : 146 ms      QT Int : 438 ms       P-R-T Axes :    241  90 degrees    QTcB Int : 499 ms     Suspect arm lead reversal, interpretation assumes no reversal  Normal sinus rhythm  Nonspecific intraventricular block  Lateral infarct ,age undetermined  Abnormal ECG  When compared with ECG of 06-Dec-2023 10:02,  The axis Shifted left  Lateral infarct is now Present    Referred By: AAAREFERRAL SELF           Confirmed By:                                   Imaging Results              US Lower Extremity Arteries Left (In process)  Result time 11/26/24 12:27:41                     US Lower Extremity Veins Left (Final result)  Result time 11/26/24 12:07:51      Final result by Leon Hall MD (11/26/24 12:07:51)                   Impression:      No convincing evidence of deep venous thrombosis in the left lower extremity.      Electronically  signed by: Leon Hall MD  Date:    11/26/2024  Time:    12:07               Narrative:    EXAMINATION:  US LOWER EXTREMITY VEINS LEFT    CLINICAL HISTORY:  Gangrene, not elsewhere classified    TECHNIQUE:  Duplex and color flow Doppler evaluation and graded compression of the left lower extremity veins was performed.    COMPARISON:  Left lower extremity venous Doppler ultrasound 02/17/2023    FINDINGS:  Left thigh veins: The common femoral, femoral, popliteal, upper greater saphenous, and deep femoral veins are patent and free of thrombus. The veins are normally compressible and have normal phasic flow and augmentation response.    Left calf veins: The visualized calf veins are patent.    Contralateral CFV: The contralateral (right) common femoral vein is patent and free of thrombus.    Miscellaneous: Mildly enlarged left inguinal lymph node measuring up to 1.6 cm in short axis with otherwise normal reniform morphology and fatty hilum, previously measured up to 1.7 cm in short axis, nonspecific.                                       X-Ray Toe 2 or More Views Left (Final result)  Result time 11/26/24 12:09:25      Final result by Reinier Ralph MD (11/26/24 12:09:25)                   Impression:      As above.      Electronically signed by: Reinier Ralph  Date:    11/26/2024  Time:    12:09               Narrative:    EXAMINATION:  XR TOE 2 OR MORE VIEWS LEFT    CLINICAL HISTORY:  left 2nd toe gangrene;    TECHNIQUE:  Three views of the left toes were performed    COMPARISON:  None.    FINDINGS:  No evidence of acute fracture or dislocation.  Joint spaces appear maintained.  Mild DJD of the great toe MTP joint.    Soft tissue swelling is noted about the visualized toes.  No large foci of subcutaneous air marginating bone.  No evidence of acute appearing periosteal reaction or erosive change.    No radiopaque foreign body.                                       Medications   piperacillin-tazobactam (ZOSYN) 4.5  g in D5W 100 mL IVPB (MB+) (0 g Intravenous Stopped 11/26/24 1134)     Medical Decision Making  Patient presents by request of Podiatry with concern of left 2nd toe gangrene.  Patient reports noticing toe appearing black 2 days ago.  Afebrile with vitals WNL.  Patient in no distress on exam    DDx:  Including but not limited to gangrene, infection, cellulitis, osteomyelitis, peripheral vascular disease    Amount and/or Complexity of Data Reviewed  Labs: ordered. Decision-making details documented in ED Course.  Radiology: ordered.               ED Course as of 11/26/24 1231   Tue Nov 26, 2024   0854 POCT Glucose(!): 182 [LC]   0956 ECG:  Normal sinus rhythm; LVH; no STEMI; ventricular rate 80; no significant change compared to most recent ECG from 12/2023 [LC]   0957 X-Ray Toe 2 or More Views Left  X-ray left toe showing no obvious bony deformity per my interpretation, pending Radiology review [KS]   1038 Comprehensive metabolic panel(!)  Creatinine 3.4, appearing grossly at baseline.  No significant electrolyte abnormality [KS]   1038 Lipase(!)  Unremarkable [KS]   1038 C-reactive protein(!)  Elevated 88.1 [KS]   1229 US Lower Extremity Veins Left  No DVT per Radiology review [KS]   1229 Patient discussed with [facility] orthopedic team, who has accepted patient for admission with Podiatry consult [KS]   1230 Patient was discussed with and seen by attending, Dr. Foy, who agrees with ED course and dispo [KS]      ED Course User Index  [KS] Gurdeep Bazan PA-C  [LC] Rufus Foy MD                           Clinical Impression:  Final diagnoses:  [I96] Gangrene of toe of left foot (Primary)  [L03.90] Cellulitis, unspecified cellulitis site          ED Disposition Condition    Admit                 Gurdeep Bazan PA-C  11/26/24 1231

## 2024-11-26 NOTE — TELEPHONE ENCOUNTER
Accompanied Mr Arrieta to the ED via wheelchair as ordered per Dr Felton. Hand off given to ED Triage Nurse. Pt to be admitted with gangrene and cellulitis of the left 2nd toe with HX of Diabetes and PVD. Per Dr Felton admit under Hospital Medicine  for IV antibiotics, MRI, Arterial and Venous US, Consult Podiatry, Infectious Disease and Interventional Cardiology. Triage Nurse verbalized understanding. No other needs voiced per Mr Arrieta or ED Nurse.

## 2024-11-26 NOTE — SUBJECTIVE & OBJECTIVE
Scheduled Meds:   amLODIPine  5 mg Oral Daily    atorvastatin  80 mg Oral QHS    enoxparin  30 mg Subcutaneous Daily     Continuous Infusions:  PRN Meds:  Current Facility-Administered Medications:     dextrose 10%, 12.5 g, Intravenous, PRN    dextrose 10%, 25 g, Intravenous, PRN    glucagon (human recombinant), 1 mg, Intramuscular, PRN    glucose, 16 g, Oral, PRN    glucose, 24 g, Oral, PRN    naloxone, 0.02 mg, Intravenous, PRN    sodium chloride 0.9%, 10 mL, Intravenous, Q12H PRN    Review of patient's allergies indicates:  No Known Allergies     Past Medical History:   Diagnosis Date    Alcohol abuse     Anasarca 1/28/2019    Anemia     Anticoagulant long-term use     Arthropathy associated with neurological disorder 9/2/2015    Atherosclerosis     Charcot foot due to diabetes mellitus     Chronic combined systolic and diastolic heart failure 01/29/2019 1-28-19 Left VentricleModerate decreased ejection fraction at 30%. Normal left ventricular cavity size. Normal wall thickness observed. Grade I (mild) left ventricular diastolic dysfunction consistent with impaired relaxation. Normal left atrial pressure. Moderate, global hypokinesis(see wall scoring diagram). Right VentricleNormal cavity size, wall thickness and ejection fraction. Wall motion n    Chronic pancreatitis 1/28/2019    CKD (chronic kidney disease) stage 3, GFR 30-59 ml/min     CKD (chronic kidney disease) stage 4, GFR 15-29 ml/min     Colon polyps     approx 5 yrs ago    Coronary artery disease due to calcified coronary lesion 05/08/2015    5 stents on ASA      Diabetic polyneuropathy associated with type 2 diabetes mellitus 9/2/2015    Diverticulosis 1/28/2019    DM type 2 with diabetic peripheral neuropathy 2/4/2019    Encounter for blood transfusion     Essential hypertension 1/28/2019    Former smoker 8/26/2015    Healed ulcer of left foot on examination 6/20/2017    Hematuria     Hydrocele     approx 1.5 yrs ago    Hyperphosphatemia      Hypoalbuminemia 2/4/2019    Hypocalcemia     Lymphedema of both lower extremities 1/29/2019    Mixed hyperlipidemia 5/8/2015    Morbid obesity with BMI of 50.0-59.9, adult 5/8/2015    Obstruction of right ureteropelvic junction (UPJ) due to stone 5/24/2021    Onychomycosis of multiple toenails with type 2 diabetes mellitus and peripheral neuropathy 6/20/2017    Perianal cyst     approx 2 yrs ago    Proteinuria     Pseudocyst of pancreas 1/28/2019 1-28-19 Liver has a cirrhotic morphology with no focal lesions.  Significant interval increase in ascites when compared to prior exam which may account for patient's abdominal distension.  Hypodense air-fluid collection along the body of the pancreas which is slightly smaller when compared to prior CT.  Findings may relate to pancreatic necrosis with pancreatic pseudocysts with infected pseudocyst    Skin cancer     skin cancer    Sleep apnea 8/26/2015    Status post bariatric surgery 1/11/2016    Type 2 diabetes mellitus, with long-term current use of insulin 5/8/2015    Urinary tract infection      Past Surgical History:   Procedure Laterality Date    ANGIOGRAPHY N/A 6/28/2019    Procedure: ANGIOGRAM-PV STENT;  Surgeon: Ewa Diagnostic Provider;  Location: [facility] OR;  Service: Radiology;  Laterality: N/A;    ANGIOPLASTY      total x5 stents    COLONOSCOPY N/A 10/6/2015    Procedure: COLONOSCOPY;  Surgeon: Shekhar Richards MD;  [facility]  Service: Endoscopy;  Laterality: N/A;  BMI over 55/2nd floor case    5 day hold Plavix, Dr Kwadwo Arroyo    COLONOSCOPY N/A 5/13/2021    Procedure: COLONOSCOPY;  Surgeon: Huan Brumfield MD;  Location: [facility] ENDO;  Service: Endoscopy;  Laterality: N/A;    CORONARY ANGIOGRAPHY Right 3/20/2019    Procedure: ANGIOGRAM, CORONARY ARTERY;  Surgeon: Bob Duque MD;  Location: [facility] CATH LAB;  Service: Cardiology;  Laterality: Right;    CORONARY ARTERY BYPASS GRAFT  2017    x3    CORONARY BYPASS GRAFT ANGIOGRAPHY   3/20/2019    Procedure: Bypass graft study;  Surgeon: Bob Duque MD;  Location: [facility] CATH LAB;  Service: Cardiology;;    CYST REMOVAL      CYSTOSCOPY Right 6/30/2021    Procedure: CYSTOSCOPY;  Surgeon: William Diaz MD;  Location: [facility] OR 1ST FLR;  Service: Urology;  Laterality: Right;    CYSTOSCOPY N/A 10/16/2023    Procedure: CYSTOSCOPY;  Surgeon: Chrystal Dias MD;  Location: [facility] OR 1ST FLR;  Service: Urology;  Laterality: N/A;    CYSTOSCOPY N/A 12/6/2023    Procedure: CYSTOSCOPY;  Surgeon: William Diaz MD;  [facility];  Service: Urology;  Laterality: N/A;    CYSTOSCOPY W/ URETERAL STENT PLACEMENT Right 6/16/2021    Procedure: CYSTOSCOPY, WITH URETERAL STENT INSERTION;  Surgeon: William Diaz MD;  [facility];  Service: Urology;  Laterality: Right;  FLUORO LESS THAN 1 HOUR    ENDOSCOPIC ULTRASOUND OF UPPER GASTROINTESTINAL TRACT N/A 2/26/2020    Procedure: ULTRASOUND, UPPER GI TRACT, ENDOSCOPIC;  Surgeon: Robbie Yang MD;  Location: [facility];  Service: Endoscopy;  Laterality: N/A;    ESOPHAGOGASTRODUODENOSCOPY N/A 7/8/2019    Procedure: ESOPHAGOGASTRODUODENOSCOPY (EGD);  Surgeon: Huan Brumfield MD;  Location: [facility];  Service: Endoscopy;  Laterality: N/A;    ESOPHAGOGASTRODUODENOSCOPY N/A 5/13/2021    Procedure: EGD (ESOPHAGOGASTRODUODENOSCOPY);  Surgeon: Huan Brumfield MD;  Location: [facility];  Service: Endoscopy;  Laterality: N/A;    EXCISION OF HYDROCELE Bilateral 6/16/2021    Procedure: HYDROCELE REPAIR;  Surgeon: William Diaz MD;  [facility];  Service: Urology;  Laterality: Bilateral;  2 HOURS    EXTRACTION - STONE Right 12/6/2023    Procedure: EXTRACTION - STONE;  Surgeon: William Diaz MD;  [facility];  Service: Urology;  Laterality: Right;    FLUOROSCOPY N/A 6/16/2021    Procedure: FLUOROSCOPY;  Surgeon: William Diaz MD;  Location: [facility] OR 2ND FLR;  Service: Urology;  Laterality: N/A;    GASTRECTOMY       INSERTION OF DIALYSIS CATHETER N/A 5/17/2019    Procedure: pleurx;  Surgeon: Ewa Diagnostic Provider;  Location: [facility] OR 2ND FLR;  Service: General;  Laterality: N/A;  Room 188/[place]    KNEE ARTHROSCOPY      LAPAROSCOPIC CHOLECYSTECTOMY N/A 5/4/2020    Procedure: CHOLECYSTECTOMY, LAPAROSCOPIC;  Surgeon: SON Rowe MD;  Location: [facility] OR;  Service: General;  Laterality: N/A;    LASER LITHOTRIPSY N/A 6/30/2021    Procedure: LITHOTRIPSY, USING LASER;  Surgeon: William Diaz MD;  Location: [facility] OR 1ST FLR;  Service: Urology;  Laterality: N/A;    LIVER BIOPSY N/A 5/4/2020    Procedure: BIOPSY, LIVER, Laproscopic ;  Surgeon: SON Rowe MD;  Location: [facility] OR;  Service: General;  Laterality: N/A;    perianal surgery      perianal cyst removed    PYELOSCOPY Right 6/30/2021    Procedure: PYELOSCOPY;  Surgeon: William Diaz MD;  Location: [facility] OR 1ST FLR;  Service: Urology;  Laterality: Right;    PYELOSCOPY Right 10/16/2023    Procedure: PYELOSCOPY;  Surgeon: Chrystal Dias MD;  Location: [facility] OR 1ST FLR;  Service: Urology;  Laterality: Right;    PYELOSCOPY Right 12/6/2023    Procedure: PYELOSCOPY;  Surgeon: William Diaz MD;  Location: [facility] OR 1ST FLR;  Service: Urology;  Laterality: Right;    REMOVAL-STENT Right 12/6/2023    Procedure: REMOVAL-STENT;  Surgeon: William Diaz MD;  Location: [facility] OR 1ST FLR;  Service: Urology;  Laterality: Right;    REPLACEMENT OF STENT Right 6/30/2021    Procedure: REPLACEMENT, STENT;  Surgeon: William Diaz MD;  Location: [facility] OR 1ST FLR;  Service: Urology;  Laterality: Right;    RETROGRADE PYELOGRAPHY Right 10/16/2023    Procedure: PYELOGRAM, RETROGRADE;  Surgeon: Chrystal Dias MD;  Location: [facility] OR 1ST FLR;  Service: Urology;  Laterality: Right;    RETROGRADE PYELOGRAPHY Right 12/6/2023    Procedure: PYELOGRAM, RETROGRADE;  Surgeon: William Diaz MD;  Location: [facility] OR [address] [place] [address];  Service: Urology;  Laterality: Right;    URETERAL STENT  PLACEMENT Right 10/16/2023    Procedure: INSERTION, STENT, URETER;  Surgeon: Chrystal Dias MD;  Location: [facility] OR 1ST FLR;  Service: Urology;  Laterality: Right;    URETEROSCOPY Right 2021    Procedure: URETEROSCOPY FLEXIBLE URETEROSCOPE;  Surgeon: William Diaz MD;  Location: [facility] OR 1ST FLR;  Service: Urology;  Laterality: Right;    URETEROSCOPY Right 10/16/2023    Procedure: URETEROSCOPY;  Surgeon: Chrystal Dias MD;  Location: [facility] OR 1ST FLR;  Service: Urology;  Laterality: Right;    URETEROSCOPY Right 2023    Procedure: URETEROSCOPY;  Surgeon: William Diaz MD;  Location: [facility] OR [facility] FLR;  Service: Urology;  Laterality: Right;       Family History       Problem Relation (Age of Onset)    Cancer Mother, Father, Paternal Grandfather, Brother    Diabetes Maternal Grandmother    Heart disease Father    No Known Problems Paternal Grandmother    Obesity Sister    Parkinsonism Brother    Stroke Maternal Grandfather          Tobacco Use    Smoking status: Former     Current packs/day: 0.00     Average packs/day: 2.0 packs/day for 30.0 years (60.0 ttl pk-yrs)     Types: Cigarettes     Start date: 1975     Quit date: 2005     Years since quittin.8    Smokeless tobacco: Never   Substance and Sexual Activity    Alcohol use: No     Comment: started ~, reports 1 shot daily, max 3 shots daily, vague about alcohol consumption. Last drink 2018    Drug use: No    Sexual activity: Not on file     Review of Systems   Constitutional:  Negative for fatigue and fever.   HENT: Negative.     Eyes: Negative.    Respiratory: Negative.     Cardiovascular: Negative.    Gastrointestinal: Negative.    Endocrine: Negative.    Genitourinary: Negative.    Skin:  Positive for color change and wound.        Reports discoloration to L 2nd digit   Allergic/Immunologic: Negative.    Hematological: Negative.    Psychiatric/Behavioral: Negative.       Objective:     Vital Signs (Most Recent):  Temp:  98.4 °F (36.9 °C) (11/26/24 0848)  Pulse: 82 (11/26/24 0919)  Resp: 16 (11/26/24 0848)  BP: (!) 153/71 (11/26/24 1235)  SpO2: 96 % (11/26/24 0848) Vital Signs (24h Range):  Temp:  [98.4 °F (36.9 °C)] 98.4 °F (36.9 °C)  Pulse:  [82-84] 82  Resp:  [16] 16  SpO2:  [96 %] 96 %  BP: (115-153)/(69-73) 153/71     Weight: 122.5 kg (270 lb)  Body mass index is 42.29 kg/m².    Foot Exam    Right Foot/Ankle     Inspection and Palpation  Ecchymosis: none  Tenderness: none   Swelling: dorsum   Skin Exam: dry skin, cellulitis and erythema; no drainage, no skin changes and no ulcer     Neurovascular  Dorsalis pedis: doppler  Posterior tibial: doppler  Saphenous nerve sensation: diminished  Tibial nerve sensation: diminished  Superficial peroneal nerve sensation: diminished  Deep peroneal nerve sensation: diminished  Sural nerve sensation: diminished    Comments  RLE: Redness and swelling noted to leg. No open wounds noted.     Left Foot/Ankle      Inspection and Palpation  Ecchymosis: second toe  Tenderness: none   Swelling: dorsum and lesser metatarsophalangeal joints   Skin Exam: cellulitis;     Neurovascular  Dorsalis pedis: doppler  Posterior tibial: doppler  Saphenous nerve sensation: diminished  Tibial nerve sensation: diminished  Superficial peroneal nerve sensation: diminished  Deep peroneal nerve sensation: diminished  Sural nerve sensation: diminished    Comments  LLE: Gangrene to 2nd digit distal tuft with surounding erythema. No masceration or granage noted to 2nd digit. Redness and swelling to leg. No other open wounds noted. No tenderness to palpation, crepitus, fluctuance, or malodor noted.     Laboratory:  BMP:   Recent Labs   Lab 11/26/24  1001   *      K 4.3      CO2 28   BUN 46*   CREATININE 3.4*   CALCIUM 8.4*     CBC:   Recent Labs   Lab 11/26/24  1001   WBC 11.23   RBC 3.93*   HGB 11.6*   HCT 35.6*      MCV 91   MCH 29.5   MCHC 32.6       Diagnostic Results:  I have reviewed all  pertinent imaging results/findings within the past 24 hours.    Clinical Findings:

## 2024-11-27 LAB
ANION GAP SERPL CALC-SCNC: 10 MMOL/L (ref 8–16)
BUN SERPL-MCNC: 46 MG/DL (ref 8–23)
CALCIUM SERPL-MCNC: 8 MG/DL (ref 8.7–10.5)
CHLORIDE SERPL-SCNC: 103 MMOL/L (ref 95–110)
CO2 SERPL-SCNC: 24 MMOL/L (ref 23–29)
CREAT SERPL-MCNC: 3.4 MG/DL (ref 0.5–1.4)
EST. GFR  (NO RACE VARIABLE): 19 ML/MIN/1.73 M^2
GLUCOSE SERPL-MCNC: 153 MG/DL (ref 70–110)
POCT GLUCOSE: 115 MG/DL (ref 70–110)
POCT GLUCOSE: 142 MG/DL (ref 70–110)
POCT GLUCOSE: 154 MG/DL (ref 70–110)
POCT GLUCOSE: 277 MG/DL (ref 70–110)
POTASSIUM SERPL-SCNC: 4.1 MMOL/L (ref 3.5–5.1)
SODIUM SERPL-SCNC: 137 MMOL/L (ref 136–145)

## 2024-11-27 PROCEDURE — 36415 COLL VENOUS BLD VENIPUNCTURE: CPT | Performed by: STUDENT IN AN ORGANIZED HEALTH CARE EDUCATION/TRAINING PROGRAM

## 2024-11-27 PROCEDURE — 99900035 HC TECH TIME PER 15 MIN (STAT)

## 2024-11-27 PROCEDURE — 94761 N-INVAS EAR/PLS OXIMETRY MLT: CPT

## 2024-11-27 PROCEDURE — 11000001 HC ACUTE MED/SURG PRIVATE ROOM

## 2024-11-27 PROCEDURE — 63600175 PHARM REV CODE 636 W HCPCS: Mod: JZ,JG | Performed by: STUDENT IN AN ORGANIZED HEALTH CARE EDUCATION/TRAINING PROGRAM

## 2024-11-27 PROCEDURE — 80048 BASIC METABOLIC PNL TOTAL CA: CPT | Performed by: STUDENT IN AN ORGANIZED HEALTH CARE EDUCATION/TRAINING PROGRAM

## 2024-11-27 PROCEDURE — 25000003 PHARM REV CODE 250: Performed by: STUDENT IN AN ORGANIZED HEALTH CARE EDUCATION/TRAINING PROGRAM

## 2024-11-27 PROCEDURE — 99232 SBSQ HOSP IP/OBS MODERATE 35: CPT | Mod: ,,, | Performed by: NURSE PRACTITIONER

## 2024-11-27 PROCEDURE — 25000003 PHARM REV CODE 250: Performed by: FAMILY MEDICINE

## 2024-11-27 RX ORDER — METRONIDAZOLE 500 MG/1
500 TABLET ORAL EVERY 8 HOURS
Status: DISCONTINUED | OUTPATIENT
Start: 2024-11-27 | End: 2024-11-27

## 2024-11-27 RX ORDER — ONDANSETRON 4 MG/1
4 TABLET, ORALLY DISINTEGRATING ORAL EVERY 6 HOURS PRN
Status: DISCONTINUED | OUTPATIENT
Start: 2024-11-27 | End: 2024-11-28 | Stop reason: HOSPADM

## 2024-11-27 RX ORDER — CEFEPIME HYDROCHLORIDE 1 G/1
1 INJECTION, POWDER, FOR SOLUTION INTRAMUSCULAR; INTRAVENOUS
Status: DISCONTINUED | OUTPATIENT
Start: 2024-11-27 | End: 2024-11-27

## 2024-11-27 RX ORDER — INSULIN ASPART 100 [IU]/ML
0-1.2 INJECTION, SOLUTION INTRAVENOUS; SUBCUTANEOUS CONTINUOUS
Status: DISCONTINUED | OUTPATIENT
Start: 2024-11-27 | End: 2024-11-28 | Stop reason: HOSPADM

## 2024-11-27 RX ORDER — TALC
6 POWDER (GRAM) TOPICAL NIGHTLY PRN
Status: DISCONTINUED | OUTPATIENT
Start: 2024-11-27 | End: 2024-11-28 | Stop reason: HOSPADM

## 2024-11-27 RX ORDER — DOXYCYCLINE HYCLATE 100 MG
100 TABLET ORAL EVERY 12 HOURS
Status: DISCONTINUED | OUTPATIENT
Start: 2024-11-27 | End: 2024-11-28 | Stop reason: HOSPADM

## 2024-11-27 RX ORDER — INSULIN ASPART 100 [IU]/ML
6-8 INJECTION, SOLUTION INTRAVENOUS; SUBCUTANEOUS
Status: DISCONTINUED | OUTPATIENT
Start: 2024-11-27 | End: 2024-11-28 | Stop reason: HOSPADM

## 2024-11-27 RX ORDER — GLUCAGON 1 MG
1 KIT INJECTION
Status: DISCONTINUED | OUTPATIENT
Start: 2024-11-27 | End: 2024-11-28 | Stop reason: HOSPADM

## 2024-11-27 RX ORDER — IBUPROFEN 200 MG
16 TABLET ORAL
Status: DISCONTINUED | OUTPATIENT
Start: 2024-11-27 | End: 2024-11-28 | Stop reason: HOSPADM

## 2024-11-27 RX ORDER — IBUPROFEN 200 MG
24 TABLET ORAL
Status: DISCONTINUED | OUTPATIENT
Start: 2024-11-27 | End: 2024-11-28 | Stop reason: HOSPADM

## 2024-11-27 RX ADMIN — INSULIN ASPART 6 UNITS: 100 INJECTION, SOLUTION INTRAVENOUS; SUBCUTANEOUS at 12:11

## 2024-11-27 RX ADMIN — Medication 6 MG: at 09:11

## 2024-11-27 RX ADMIN — CLOPIDOGREL BISULFATE 75 MG: 75 TABLET ORAL at 08:11

## 2024-11-27 RX ADMIN — ONDANSETRON 4 MG: 4 TABLET, ORALLY DISINTEGRATING ORAL at 03:11

## 2024-11-27 RX ADMIN — FUROSEMIDE 80 MG: 40 TABLET ORAL at 08:11

## 2024-11-27 RX ADMIN — AMLODIPINE BESYLATE 5 MG: 5 TABLET ORAL at 08:11

## 2024-11-27 RX ADMIN — DOXYCYCLINE HYCLATE 100 MG: 100 TABLET, COATED ORAL at 08:11

## 2024-11-27 RX ADMIN — ASPIRIN 81 MG: 81 TABLET, COATED ORAL at 08:11

## 2024-11-27 RX ADMIN — DOXYCYCLINE HYCLATE 100 MG: 100 TABLET, COATED ORAL at 02:11

## 2024-11-27 RX ADMIN — ENOXAPARIN SODIUM 30 MG: 30 INJECTION SUBCUTANEOUS at 06:11

## 2024-11-27 RX ADMIN — ISOSORBIDE MONONITRATE 30 MG: 30 TABLET, EXTENDED RELEASE ORAL at 08:11

## 2024-11-27 RX ADMIN — METRONIDAZOLE 500 MG: 500 INJECTION, SOLUTION INTRAVENOUS at 10:11

## 2024-11-27 RX ADMIN — ATORVASTATIN CALCIUM 80 MG: 40 TABLET, FILM COATED ORAL at 08:11

## 2024-11-27 RX ADMIN — METRONIDAZOLE 500 MG: 500 INJECTION, SOLUTION INTRAVENOUS at 12:11

## 2024-11-27 RX ADMIN — INSULIN ASPART 1.2 UNITS/HR: 100 INJECTION, SOLUTION INTRAVENOUS; SUBCUTANEOUS at 12:11

## 2024-11-27 NOTE — PLAN OF CARE
[employer] Manager went to meet with patient. Family at bedside. Patient reports he is independent and lives at home with his spouse. He does not use any DME or have Home Health. Patient reports he has had Home Health in the past. He still drives. Patient reports his Cardiologist is Dr. Giraldo at . He does follow with Dr. Felton for Podiatry.  to request appointments. Patient encouraged to call with any questions or concerns.  will continue to follow patient through transitions of care and assist with any discharge needs.     Emergency Contacts    Name Relation Home Work Mobile   Geri Arrieta Spouse   [phone]   Nena Jackman Sister   [phone]     Future Appointments   Date Time Provider Department Center   11/27/2024  5:00 PM [facility] MRI1 [address] LIMIT [facility] MRI Micheal Maloney Jr., MD  Cardiology Cardiovascular Disease [phone] [address] [facility] [place] [address]      Next Steps: Follow up  Instructions:  spoke with . They are working to get you a sooner follow-up appointment.    Reza Boyer MD PCP - General Internal Medicine [phone] [address] [place] [address]     Next Steps: Follow up on 12/12/2024  Instructions: at 10:15 am--You will be Leon Boyer. Dr. Reza Boyer did not have any early availability. Primary Care Physician     Savanah Felton DPM  Podiatry Wound Care [phone] [address] [place] [address]      Next Steps: Follow up  Instructions: Podiatry Follow-Up        11/27/24 1235   Discharge Assessment   Assessment Type Discharge Planning Assessment   Confirmed/corrected address, phone number and insurance Yes   Confirmed Demographics Correct on Facesheet   Source of Information patient;health record   People in Home significant other;spouse   Facility Arrived From: Home   Do you expect to return to your current  living situation? Yes   Do you have help at home or someone to help you manage your care at home? Yes   Who are your caregiver(s) and their phone number(s)? Geri Arrieta Spouse   [phone]   Prior to hospitilization cognitive status: Alert/Oriented   Current cognitive status: Alert/Oriented   Walking or Climbing Stairs Difficulty no   Dressing/Bathing Difficulty no   Equipment Currently Used at Home glucometer  (Dexcom)   Do you take prescription medications? Yes   Do you have prescription coverage? Yes   Who is going to help you get home at discharge? Geri Arrieta Spouse   [phone]   How do you get to doctors appointments? car, drives self   Are you on dialysis? No   Do you take coumadin? No   Discharge Plan A Home with family   Discharge Plan B Home with family;Home Health   DME Needed Upon Discharge  none   Discharge Plan discussed with: Patient   Transition of Care Barriers None   Physical Activity   On average, how many days per week do you engage in moderate to strenuous exercise (like a brisk walk)? Pt Declined   On average, how many minutes do you engage in exercise at this level? Pt Declined   Financial Resource Strain   How hard is it for you to pay for the very basics like food, housing, medical care, and heating? Pt Declined   Housing Stability   In the last 12 months, was there a time when you were not able to pay the mortgage or rent on time? N   At any time in the past 12 months, were you homeless or living in a shelter (including now)? N   Transportation Needs   Has the lack of transportation kept you from medical appointments, meetings, work or from getting things needed for daily living? No   Food Insecurity   Within the past 12 months, you worried that your food would run out before you got the money to buy more. Pt Declined   Within the past 12 months, the food you bought just didn't last and you didn't have money to get more. Pt Declined   Stress   Do you feel stress - tense,  restless, nervous, or anxious, or unable to sleep at night because your mind is troubled all the time - these days? Pt Declined   Social Isolation   How often do you feel lonely or isolated from those around you?  Patient declined   Alcohol Use   Q1: How often do you have a drink containing alcohol? Pt Declined   Q2: How many drinks containing alcohol do you have on a typical day when you are drinking? Pt Declined   Utilities   In the past 12 months has the electric, gas, oil, or water company threatened to shut off services in your home? Pt Declined   Health Literacy   How often do you need to have someone help you when you read instructions, pamphlets, or other written material from your doctor or pharmacy? Patient declines to respond   OTHER   Name(s) of People in Home MeenakshiGeri Spouse   [phone]     Radha Hill RN    [phone]

## 2024-11-27 NOTE — PROGRESS NOTES
[facility]  Cardiology  Progress Note    Patient Name: Jian Arrieta  MRN: [id number]  Admission Date: 11/26/2024  Hospital Length of Stay: 1 days  Code Status: Full Code   Attending Physician: Sharifa Duque MD   Primary Care Physician: Leon Barajas DO  Expected Discharge Date:   Principal Problem:Critical limb ischemia of left lower extremity    Subjective:     Hospital Course:   11/27/2024 LE angiogram yesterday with following results:     Findings:     Left lower extremity:  Common iliac artery:  no significant disease  External iliac artery: no significant disease  Internal iliac artery: no significant disease  Common femoral artery no significant disease  Superficial femoral artery:  mild diffuse calcified disease without any areas of focal stenosis  Profunda femoris artery:  no significant disease  Popliteal artery: no significant disease.  Popliteal artery branches into anterior tibial/peroneal/popliteal artery  Anterior tibial artery:  no significant disease.  Gives rise to DP at the dorsum of the foot and supplies the plantar arch  Posterior tibial artery:   long segment occlusion in the mid to distal posterior tibial artery.  Distal PT reconstitutes via collaterals from the peroneal artery  Peroneal artery:  no significant disease        Right lower extremity:  Common iliac artery:  no significant disease  External iliac artery:  no significant disease  Internal iliac artery:  no significant disease  Common femoral artery  no significant disease      Unable to assess right lower extremity beyond distal SFA because of significant pain in right lower extremity  During CO2 angiography.   Patient does have triphasic DP on examination post procedure.  Recommend arterial ultrasound of right lower extremity if clinically warranted.         Recommendations:      Angiosome supplying the 2nd left toe well-perfused via the anterior tibial/ dorsalis pedis artery.  Recommend medical therapy for mid   left posterior tibial artery occlusion with aspirin and high-intensity statin.    Right lower extremity arterial ultrasound if clinically warranted   No iodinated contrast used as a part of this procedure    RLE arterial ultrasound this AM. Left toe gangrenous. CBC and BMP reviewed          Review of Systems   Constitutional: Negative for chills, decreased appetite, diaphoresis, fever, malaise/fatigue, weight gain and weight loss.   Cardiovascular:  Negative for chest pain, claudication, dyspnea on exertion, irregular heartbeat, leg swelling, near-syncope, orthopnea, palpitations and paroxysmal nocturnal dyspnea.   Respiratory:  Negative for cough, shortness of breath, snoring, sputum production and wheezing.    Endocrine: Negative for cold intolerance, heat intolerance, polydipsia, polyphagia and polyuria.   Skin:  Positive for poor wound healing. Negative for color change, dry skin, itching and nail changes.   Musculoskeletal:  Negative for back pain, gout, joint pain and joint swelling.   Gastrointestinal:  Negative for bloating, abdominal pain, constipation, diarrhea, hematemesis, hematochezia, melena, nausea and vomiting.   Genitourinary:  Negative for dysuria, hematuria and nocturia.   Neurological:  Negative for dizziness, headaches, light-headedness, numbness, paresthesias and weakness.   Psychiatric/Behavioral:  Negative for altered mental status, depression and memory loss.      Objective:     Vital Signs (Most Recent):  Temp: 98.1 °F (36.7 °C) (11/27/24 1117)  Pulse: 82 (11/27/24 1117)  Resp: 18 (11/27/24 1117)  BP: (!) 103/56 (11/27/24 1117)  SpO2: 96 % (11/27/24 1117) Vital Signs (24h Range):  Temp:  [96.7 °F (35.9 °C)-98.7 °F (37.1 °C)] 98.1 °F (36.7 °C)  Pulse:  [73-88] 82  Resp:  [18-20] 18  SpO2:  [95 %-100 %] 96 %  BP: (103-166)/(56-86) 103/56     Weight: 116.8 kg (257 lb 8 oz)  Body mass index is 40.33 kg/m².     SpO2: 96 %         Intake/Output Summary (Last 24 hours) at 11/27/2024 1445  Last  data filed at 11/27/2024 0919  Gross per 24 hour   Intake 476 ml   Output 500 ml   Net -24 ml       Lines/Drains/Airways       Drain  Duration                  Ureteral Drain/Stent 06/16/21 0912 Right ureter 24 Fr. 1260 days              Peripheral Intravenous Line  Duration                  Peripheral IV - Single Lumen 11/26/24 1000 20 G Right Antecubital 1 day                       Physical Exam  Constitutional:       General: He is not in acute distress.     Appearance: He is well-developed.   Cardiovascular:      Rate and Rhythm: Normal rate and regular rhythm.      Heart sounds: No murmur heard.     No gallop.   Pulmonary:      Effort: Pulmonary effort is normal. No respiratory distress.      Breath sounds: Normal breath sounds. No wheezing.   Abdominal:      General: Bowel sounds are normal. There is no distension.      Palpations: Abdomen is soft.      Tenderness: There is no abdominal tenderness.   Skin:     General: Skin is warm and dry.   Neurological:      Mental Status: He is alert and oriented to person, place, and time.            Significant Labs: BMP:   Recent Labs   Lab 11/26/24  1001 11/27/24  1347   * 153*    137   K 4.3 4.1    103   CO2 28 24   BUN 46* 46*   CREATININE 3.4* 3.4*   CALCIUM 8.4* 8.0*    and CBC   Recent Labs   Lab 11/26/24  1001   WBC 11.23   HGB 11.6*   HCT 35.6*          Significant Imaging: Echocardiogram: Transthoracic echo (TTE) complete (Cupid Only):   Results for orders placed or performed during the hospital encounter of 10/12/23   Echo   Result Value Ref Range    Ascending aorta 4.11 cm    STJ 3.59 cm    AV mean gradient 4 mmHg    Ao peak anthony 1.35 m/s    Ao VTI 26.85 cm    IVRT 65.65 msec    IVS 1.03 0.6 - 1.1 cm    LA size 3.21 cm    Left Atrium Major Axis 5.11 cm    Left Atrium Minor Axis 5.35 cm    LVIDd 5.24 3.5 - 6.0 cm    LVIDs 4.25 (A) 2.1 - 4.0 cm    LVOT diameter 2.50 cm    LVOT peak VTI 21.45 cm    PW 1.00 0.6 - 1.1 cm    MV Peak A Anthony  "0.97 m/s    E wave deceleration time 205.82 msec    MV Peak E Anthony 0.66 m/s    PV Peak D Anthony 0.32 m/s    PV Peak S Anthony 0.42 m/s    RA Major Axis 4.92 cm    RA Width 3.79 cm    RVDD 3.97 cm    Sinus 4.13 cm    TAPSE 1.82 cm    TR Max Anthony 2.11 m/s    LA WIDTH 4.48 cm    MV stenosis pressure 1/2 time 59.69 ms    LV Diastolic Volume 131.88 mL    LV Systolic Volume 80.71 mL    LVOT peak anthony 1.09 m/s    TDI LATERAL 0.08 m/s    TDI SEPTAL 0.06 m/s    LA Vol (MOD) 82.37 cm3    MV "A" wave duration 16.56 msec    LV LATERAL E/E' RATIO 8.25 m/s    LV SEPTAL E/E' RATIO 11.00 m/s    FS 19 %    LA Vol 63.90 cm3    LV mass 200.58 g    ZLVIDD -5.89     ZLVIDS -2.30     Left Ventricle Relative Wall Thickness 0.38 cm    AV valve area 3.92 cm²    AV Velocity Ratio 0.81     AV index (prosthetic) 0.80     MV valve area p 1/2 method 3.69 cm2    E/A ratio 0.68     Mean e' 0.07 m/s    Pulm vein S/D ratio 1.31     LVOT area 4.9 cm2    LVOT stroke volume 105.24 cm3    AV peak gradient 7 mmHg    E/E' ratio 9.43 m/s    LV Systolic Volume Index 34.6 mL/m2    LV Diastolic Volume Index 56.60 mL/m2    BEAR 27.4 mL/m2    LV Mass Index 86 g/m2    Triscuspid Valve Regurgitation Peak Gradient 18 mmHg    BEAR (MOD) 35.4 mL/m2    MONICA by Velocity Ratio 3.96 cm²    BSA 2.45 m2    TV resting pulmonary artery pressure 21 mmHg    RV TB RVSP 5 mmHg    Est. RA pres 3 mmHg    Narrative      Left Ventricle: The left ventricle is normal in size. Normal wall   thickness. Septal motion is consistent with post-operative status. There   is low normal systolic function with a visually estimated ejection   fraction of 50 - 55%. There is normal diastolic function.    Right Ventricle: Normal right ventricular cavity size. Wall thickness   is normal. Right ventricle wall motion  is normal. Systolic function is   normal.    Pulmonary Artery: The estimated pulmonary artery systolic pressure is   21 mmHg.    IVC/SVC: Normal venous pressure at 3 mmHg.       Assessment and " Plan:     Brief HPI: Seen on afternoon NP rounds while resting in bed. Reviewed angiogram findings from yesterday     * Critical limb ischemia of left lower extremity  - gangrenous toe to left foot  - LE angiogram yesterday with ATK with no significant stenosis; 2V run off with long segment occlusion of PT but overall decent flow to foot; difficult to assess BTK on right with LE angiogram given pain; RLE arterial ultrasound today with patent ATK vessels with PT and AT disease- no wounds to RLE present  - will continue ASA, Plavix and statin therapy; no plans for revascularization given adequate flow to left foot and optimistic of wound healing with amputation or debridement  - further management per Podiatry; Cardiology to follow from af[place] for now         VTE Risk Mitigation (From admission, onward)           Ordered     enoxaparin injection 30 mg  Daily         11/26/24 1419     heparin infusion 1,000 units/500 ml in 0.9% NaCl (pressure line flush)  Intra-op continuous PRN         11/26/24 1450     IP VTE HIGH RISK PATIENT  Once         11/26/24 1419     Place sequential compression device  Until discontinued         11/26/24 1419                    Shila Floyd, APRN, ANP  Cardiology  [place] - Telemetry

## 2024-11-27 NOTE — ASSESSMENT & PLAN NOTE
S/p peripheral angiogram, medical management recommended by Cardiology  On aspirin, Plavix, statin

## 2024-11-27 NOTE — ASSESSMENT & PLAN NOTE
- gangrenous toe to left foot  - LE angiogram yesterday with ATK with no significant stenosis; 2V run off with long segment occlusion of PT but overall decent flow to foot; difficult to assess BTK on right with LE angiogram given pain; RLE arterial ultrasound today with patent ATK vessels with PT and AT disease- no wounds to RLE present  - will continue ASA, Plavix and statin therapy; no plans for revascularization given adequate flow to left foot and optimistic of wound healing with amputation or debridement  - further management per Podiatry; Cardiology to follow from af[place] for now

## 2024-11-27 NOTE — PLAN OF CARE
Patient transferred via stretcher to Room 431 4th floor medsur. VSS, AAOx4. No c/o pain.   Right radial gauze/tegaderm site CDI. No bleeding no hematoma noted. Site and surrounding area soft.  Assessed by FABIANO Judd at bedside. +2 marysol radial pulses.  All questions answered.

## 2024-11-27 NOTE — PROGRESS NOTES
Pharmacist Intervention IV to PO Note    Jian Arrieta is a 69 y.o. male being treated with IV medication metronidazole    Patient Data:    Vital Signs (Most Recent):  Temp: 97.9 °F (36.6 °C) (11/27/24 0814)  Pulse: 79 (11/27/24 0814)  Resp: 18 (11/27/24 0814)  BP: (!) 123/59 (11/27/24 0814)  SpO2: 97 % (11/27/24 0814) Vital Signs (72h Range):  Temp:  [96.7 °F (35.9 °C)-98.7 °F (37.1 °C)]   Pulse:  [73-88]   Resp:  [16-20]   BP: (109-166)/(56-86)   SpO2:  [95 %-100 %]      CBC:  Recent Labs   Lab 11/26/24  1001   WBC 11.23   RBC 3.93*   HGB 11.6*   HCT 35.6*      MCV 91   MCH 29.5   MCHC 32.6     CMP:     Recent Labs   Lab 11/26/24  1001   *   CALCIUM 8.4*   ALBUMIN 2.1*   PROT 7.2      K 4.3   CO2 28      BUN 46*   CREATININE 3.4*   ALKPHOS 132   ALT 13   AST 26   BILITOT 0.5       Dietary Orders:  Diet Orders            Diet diabetic Cardiac (Low Na/Chol); 2000 Calories (up to 75 gm per meal): Diabetic starting at 11/26 1709            Based on the following criteria, this patient qualifies for intravenous to oral conversion:  [x] The patients gastrointestinal tract is functioning (tolerating medications via oral or enteral route for 24 hours and tolerating food or enteral feeds for 24 hours).  [x] The patient is hemodynamically stable for 24 hours (heart rate <100 beats per minute, systolic blood pressure >99 mm Hg, and respiratory rate <20 breaths per minute).  [x] The patient shows clinical improvement (afebrile for at least 24 hours and white blood cell count downtrending or normalized). Additionally, the patient must be non-neutropenic (absolute neutrophil count >500 cells/mm3).  [x] For antimicrobials, the patient has received IV therapy for at least 24 hours.    IV medication metronidazole will be changed to oral medication     Pharmacist's Name: Rasta Griffith  Pharmacist's Extension: [phone]

## 2024-11-27 NOTE — PLAN OF CARE
Remaining air removed from R radial vasc band. Gauze and tegaderm dressing applied. Site is CDI. No bleeding or hematoma noted around site. Site and surrounding areas soft. +2 marysol radial pulses palpated. Skin normal in color, warm to touch, < 3 sec cap refill. Will continue to monitor pt.

## 2024-11-27 NOTE — PLAN OF CARE
Problem: Adult Inpatient Plan of Care  Goal: Plan of Care Review  Outcome: Progressing     Problem: Adult Inpatient Plan of Care  Goal: Patient-Specific Goal (Individualized)  Outcome: Progressing   VN note:   Patient's chart, labs and vital signs reviewed, will be available to intervene if needed.

## 2024-11-27 NOTE — H&P
[facility] Medicine  History & Physical    Patient Name: Jian Arrieta  MRN: [id number]  Patient Class: IP- Inpatient  Admission Date: 11/26/2024  Attending Physician: Marialuisa Ybarra*   Primary Care Provider: Leon Barajas DO         Patient information was obtained from patient and ER records.     Subjective:     Principal Problem:Critical limb ischemia of left lower extremity    Chief Complaint:   Chief Complaint   Patient presents with    Wound Check     Pt presents in ED from podiatry dx gangrene ulcer to L 2nd toe.        HPI: 70 YO M with PMHx significant for obesity, HTN, IDDM, HLD, CAD s/p stent, nephropathy diabetic, CHF,  presented to the ER for left foot 2nd toe gangrene that has stated few weeks ago. Per patient symptoms has started a week ago with rash from his thigh through his foot. Then he started to see that his left foot became more erythematous despite suffer from chronic stasis dermatitis/cellulitis, and blackness accentuated on his 2nd toe has changed color. He went to get a pedicure. While the cleaning the toe, the nail and skin fell off. He was advised to come to the ER. He also recalled that he had an episode of fever, chills which has subsided. Denies trauma, or previous wound. He was found to have left foot with critical limb ischemia. Labs in the ER  with CRP 88.1, BUN/Cr 46/3.4 eGFR 19, lower extremity doppler left no DVT, left lower extremity arterial doppler lack of flow involving the mid and distal aspect of the anterior and posterior tibial arteries. He was brought to the cath labs for further investigation.    Past Medical History:   Diagnosis Date    Alcohol abuse     Anasarca 1/28/2019    Anemia     Anticoagulant long-term use     Arthropathy associated with neurological disorder 9/2/2015    Atherosclerosis     Charcot foot due to diabetes mellitus     Chronic combined systolic and diastolic heart failure 01/29/2019 1-28-19 Left  VentricleModerate decreased ejection fraction at 30%. Normal left ventricular cavity size. Normal wall thickness observed. Grade I (mild) left ventricular diastolic dysfunction consistent with impaired relaxation. Normal left atrial pressure. Moderate, global hypokinesis(see wall scoring diagram). Right VentricleNormal cavity size, wall thickness and ejection fraction. Wall motion n    Chronic pancreatitis 1/28/2019    CKD (chronic kidney disease) stage 3, GFR 30-59 ml/min     CKD (chronic kidney disease) stage 4, GFR 15-29 ml/min     Colon polyps     approx 5 yrs ago    Coronary artery disease due to calcified coronary lesion 05/08/2015    5 stents on ASA      Diabetic polyneuropathy associated with type 2 diabetes mellitus 9/2/2015    Diverticulosis 1/28/2019    DM type 2 with diabetic peripheral neuropathy 2/4/2019    Encounter for blood transfusion     Essential hypertension 1/28/2019    Former smoker 8/26/2015    Healed ulcer of left foot on examination 6/20/2017    Hematuria     Hydrocele     approx 1.5 yrs ago    Hyperphosphatemia     Hypoalbuminemia 2/4/2019    Hypocalcemia     Lymphedema of both lower extremities 1/29/2019    Mixed hyperlipidemia 5/8/2015    Morbid obesity with BMI of 50.0-59.9, adult 5/8/2015    Obstruction of right ureteropelvic junction (UPJ) due to stone 5/24/2021    Onychomycosis of multiple toenails with type 2 diabetes mellitus and peripheral neuropathy 6/20/2017    Perianal cyst     approx 2 yrs ago    Proteinuria     Pseudocyst of pancreas 1/28/2019 1-28-19 Liver has a cirrhotic morphology with no focal lesions.  Significant interval increase in ascites when compared to prior exam which may account for patient's abdominal distension.  Hypodense air-fluid collection along the body of the pancreas which is slightly smaller when compared to prior CT.  Findings may relate to pancreatic necrosis with pancreatic pseudocysts with infected pseudocyst    Skin cancer     skin cancer     Sleep apnea 8/26/2015    Status post bariatric surgery 1/11/2016    Type 2 diabetes mellitus, with long-term current use of insulin 5/8/2015    Urinary tract infection        Past Surgical History:   Procedure Laterality Date    ANGIOGRAPHY N/A 6/28/2019    Procedure: ANGIOGRAM-PV STENT;  Surgeon: Ewa Diagnostic Provider;  Location: [facility] OR;  Service: Radiology;  Laterality: N/A;    ANGIOPLASTY      total x5 stents    COLONOSCOPY N/A 10/6/2015    Procedure: COLONOSCOPY;  Surgeon: Shekhar Richards MD;  Location: [facility] ENDO (2ND FLR);  Service: Endoscopy;  Laterality: N/A;  BMI over 55/2nd floor case    5 day hold Plavix, Dr Kwadwo Arroyo    COLONOSCOPY N/A 5/13/2021    Procedure: COLONOSCOPY;  Surgeon: Huan Brumfield MD;  Location: [facility] ENDO;  Service: Endoscopy;  Laterality: N/A;    CORONARY ANGIOGRAPHY Right 3/20/2019    Procedure: ANGIOGRAM, CORONARY ARTERY;  Surgeon: Bob Duque MD;  Location: [facility] CATH LAB;  Service: Cardiology;  Laterality: Right;    CORONARY ARTERY BYPASS GRAFT  2017    x3    CORONARY BYPASS GRAFT ANGIOGRAPHY  3/20/2019    Procedure: Bypass graft study;  Surgeon: Bob Duque MD;  Location: [facility] CATH LAB;  Service: Cardiology;;    CYST REMOVAL      CYSTOSCOPY Right 6/30/2021    Procedure: CYSTOSCOPY;  Surgeon: William Diaz MD;  Location: [facility] 1ST FLR;  Service: Urology;  Laterality: Right;    CYSTOSCOPY N/A 10/16/2023    Procedure: CYSTOSCOPY;  Surgeon: Chrystal Disa MD;  Location: [facility] OR 1ST FLR;  Service: Urology;  Laterality: N/A;    CYSTOSCOPY N/A 12/6/2023    Procedure: CYSTOSCOPY;  Surgeon: William Diaz MD;  Location: [facility] OR 1ST FLR;  Service: Urology;  Laterality: N/A;    CYSTOSCOPY W/ URETERAL STENT PLACEMENT Right 6/16/2021    Procedure: CYSTOSCOPY, WITH URETERAL STENT INSERTION;  Surgeon: William Diaz MD;  Location: [facility] OR 2ND FLR;  Service: Urology;  Laterality: Right;  FLUORO LESS THAN 1 HOUR    ENDOSCOPIC ULTRASOUND OF UPPER GASTROINTESTINAL  TRACT N/A 2/26/2020    Procedure: ULTRASOUND, UPPER GI TRACT, ENDOSCOPIC;  Surgeon: Robbie Yang MD;  Location: [facility] ENDO;  Service: Endoscopy;  Laterality: N/A;    ESOPHAGOGASTRODUODENOSCOPY N/A 7/8/2019    Procedure: ESOPHAGOGASTRODUODENOSCOPY (EGD);  Surgeon: Huan Brumfield MD;  Location: [facility] ENDO;  Service: Endoscopy;  Laterality: N/A;    ESOPHAGOGASTRODUODENOSCOPY N/A 5/13/2021    Procedure: EGD (ESOPHAGOGASTRODUODENOSCOPY);  Surgeon: Huan Brumfield MD;  Location: [facility] ENDO;  Service: Endoscopy;  Laterality: N/A;    EXCISION OF HYDROCELE Bilateral 6/16/2021    Procedure: HYDROCELE REPAIR;  Surgeon: William Diaz MD;  Location: [facility] OR 2ND FLR;  Service: Urology;  Laterality: Bilateral;  2 HOURS    EXTRACTION - STONE Right 12/6/2023    Procedure: EXTRACTION - STONE;  Surgeon: William Diaz MD;  Location: [facility] OR 1ST FLR;  Service: Urology;  Laterality: Right;    FLUOROSCOPY N/A 6/16/2021    Procedure: FLUOROSCOPY;  Surgeon: William Diaz MD;  Location: [facility] OR 2ND FLR;  Service: Urology;  Laterality: N/A;    GASTRECTOMY      INSERTION OF DIALYSIS CATHETER N/A 5/17/2019    Procedure: pleurx;  Surgeon: [facility] Diagnostic Provider;  Location: [facility] OR 2ND FLR;  Service: General;  Laterality: N/A;  Room 188/[facility]    KNEE ARTHROSCOPY      LAPAROSCOPIC CHOLECYSTECTOMY N/A 5/4/2020    Procedure: CHOLECYSTECTOMY, LAPAROSCOPIC;  Surgeon: SON Rowe MD;  Location: [facility] OR;  Service: General;  Laterality: N/A;    LASER LITHOTRIPSY N/A 6/30/2021    Procedure: LITHOTRIPSY, USING LASER;  Surgeon: William Diaz MD;  Location: [facility] OR 1ST FLR;  Service: Urology;  Laterality: N/A;    LIVER BIOPSY N/A 5/4/2020    Procedure: BIOPSY, LIVER, Laproscopic ;  Surgeon: SON Rowe MD;  Location: [facility] OR;  Service: General;  Laterality: N/A;    perianal surgery      perianal cyst removed    PYELOSCOPY Right 6/30/2021    Procedure: PYELOSCOPY;  Surgeon: William Diaz MD;  Location: [facility] OR [address] [place] [address];  Service:  Urology;  Laterality: Right;    PYELOSCOPY Right 10/16/2023    Procedure: PYELOSCOPY;  Surgeon: Chrystal Dias MD;  Location: [facility] OR [facility] FLR;  Service: Urology;  Laterality: Right;    PYELOSCOPY Right 12/6/2023    Procedure: PYELOSCOPY;  Surgeon: William Diaz MD;  [facility];  Service: Urology;  Laterality: Right;    REMOVAL-STENT Right 12/6/2023    Procedure: REMOVAL-STENT;  Surgeon: William Diaz MD;  Location: [facility] OR [facility] FLR;  Service: Urology;  Laterality: Right;    REPLACEMENT OF STENT Right 6/30/2021    Procedure: REPLACEMENT, STENT;  Surgeon: William Diaz MD;  Location: [facility] OR [facility] FLR;  Service: Urology;  Laterality: Right;    RETROGRADE PYELOGRAPHY Right 10/16/2023    Procedure: PYELOGRAM, RETROGRADE;  Surgeon: Chrystal Dias MD;  [facility];  Service: Urology;  Laterality: Right;    RETROGRADE PYELOGRAPHY Right 12/6/2023    Procedure: PYELOGRAM, RETROGRADE;  Surgeon: William Diaz MD;  [facility];  Service: Urology;  Laterality: Right;    URETERAL STENT PLACEMENT Right 10/16/2023    Procedure: INSERTION, STENT, URETER;  Surgeon: Chrystal Dias MD;  [facility];  Service: Urology;  Laterality: Right;    URETEROSCOPY Right 6/30/2021    Procedure: URETEROSCOPY FLEXIBLE URETEROSCOPE;  Surgeon: William Diaz MD;  [facility];  Service: Urology;  Laterality: Right;    URETEROSCOPY Right 10/16/2023    Procedure: URETEROSCOPY;  Surgeon: Chrystal Dias MD;  [facility];  Service: Urology;  Laterality: Right;    URETEROSCOPY Right 12/6/2023    Procedure: URETEROSCOPY;  Surgeon: William Diaz MD;  [facility];  Service: Urology;  Laterality: Right;       Review of patient's allergies indicates:  No Known Allergies    No current facility-administered medications on file prior to encounter.     Current Outpatient Medications on File Prior to Encounter   Medication Sig     "acetaminophen (TYLENOL) 325 MG tablet Take 2 tablets (650 mg total) by mouth every 8 (eight) hours as needed for Pain.    amLODIPine (NORVASC) 10 MG tablet Take 1 tablet (10 mg total) by mouth once daily.    aspirin (ECOTRIN) 81 MG EC tablet Take 1 tablet (81 mg total) by mouth once daily. (Patient not taking: Reported on 1/4/2024)    blood pressure monitor Kit 1 kit by Misc.(Non-Drug; Combo Route) route 2 (two) times a day.    blood sugar diagnostic (ONETOUCH VERIO TEST STRIPS) Strp 1 each by Misc.(Non-Drug; Combo Route) route 2 (two) times a day.    blood-glucose sensor (DEXCOM G6 SENSOR) Sandi Inject 1 each into the skin every 10 days.    blood-glucose transmitter (DEXCOM G6 TRANSMITTER) Sandi Inject 1 each into the skin every 10 days.    glucagon (BAQSIMI) 3 mg/actuation Spry 1 each by Nasal route daily as needed (if glucose is less than 70 - can repeat in 1 hour if still low/less than 70).    HUMALOG KWIKPEN INSULIN 100 unit/mL pen Inject 5 Units into the skin before meals as needed (before each meal - if OFF of insulin pump). This is emergency back up insulin to use with meals if OFF of insulin pump    hydrALAZINE (APRESOLINE) 25 MG tablet Take 1 tablet (25 mg total) by mouth every 8 (eight) hours.    insulin detemir U-100, Levemir, 100 unit/mL (3 mL) SubQ InPn pen Inject 30 Units into the skin every evening. This is emergency back up insulin only if OFF of your insulin pump    insulin lispro-aabc (LYUMJEV U-100 INSULIN) 100 unit/mL Inject 80 Units into the skin continuous. Uses up to 80 units daily with omnipod 5 insulin pump as directed.    insulin pump cart,automated,BT (OMNIPOD 5 G6 PODS, GEN 5,) Crtg INJECT 1 EACH INTO THE SKIN EVERY OTHER DAY.    lancets (ONETOUCH DELICA PLUS LANCET) 33 gauge Misc 1 lancet  by Misc.(Non-Drug; Combo Route) route 2 (two) times daily.    pen needle, diabetic 32 gauge x 5/32" Ndle Use with toujeo insulin one daily only as Emergency use/back up insulin - if OFF OF your insulin " pump.    rosuvastatin (CRESTOR) 20 MG tablet Take 20 mg by mouth once daily.    torsemide (DEMADEX) 20 MG Tab Take 1 tablet (20 mg total) by mouth once daily.     Family History       Problem Relation (Age of Onset)    Cancer Mother, Father, Paternal Grandfather, Brother    Diabetes Maternal Grandmother    Heart disease Father    No Known Problems Paternal Grandmother    Obesity Sister    Parkinsonism Brother    Stroke Maternal Grandfather          Tobacco Use    Smoking status: Former     Current packs/day: 0.00     Average packs/day: 2.0 packs/day for 30.0 years (60.0 ttl pk-yrs)     Types: Cigarettes     Start date: 1975     Quit date: 2005     Years since quittin.8    Smokeless tobacco: Never   Substance and Sexual Activity    Alcohol use: No     Comment: started ~, reports 1 shot daily, max 3 shots daily, vague about alcohol consumption. Last drink 2018    Drug use: No    Sexual activity: Not on file     Review of Systems   Constitutional:  Positive for activity change. Negative for appetite change, chills and fever.   Respiratory:  Negative for cough, chest tightness, shortness of breath and wheezing.    Cardiovascular:  Positive for leg swelling. Negative for chest pain and palpitations.   Gastrointestinal:  Negative for abdominal pain, constipation, diarrhea, nausea and vomiting.   Musculoskeletal:  Positive for arthralgias and gait problem.   Skin:  Positive for color change, pallor and rash.   Neurological:  Negative for dizziness, speech difficulty, weakness and headaches.   Psychiatric/Behavioral:  Negative for agitation, confusion and hallucinations.    All other systems reviewed and are negative.    Objective:     Vital Signs (Most Recent):  Temp: 98.7 °F (37.1 °C) (24)  Pulse: 78 (24)  Resp: 18 (24)  BP: (!) 109/56 (24)  SpO2: 95 % (24) Vital Signs (24h Range):  Temp:  [96.7 °F (35.9 °C)-98.7 °F (37.1 °C)] 98.7 °F (37.1  °C)  Pulse:  [73-88] 78  Resp:  [16-20] 18  SpO2:  [95 %-100 %] 95 %  BP: (109-166)/(56-86) 109/56     Weight: 116.8 kg (257 lb 8 oz)  Body mass index is 40.33 kg/m².     Physical Exam  Vitals and nursing note reviewed.   Constitutional:       General: He is not in acute distress.     Appearance: He is ill-appearing.   HENT:      Head: Normocephalic and atraumatic.      Nose: No congestion.      Mouth/Throat:      Mouth: Mucous membranes are moist.      Pharynx: No oropharyngeal exudate.   Eyes:      Extraocular Movements: Extraocular movements intact.      Pupils: Pupils are equal, round, and reactive to light.   Cardiovascular:      Rate and Rhythm: Normal rate. Rhythm irregular.      Heart sounds: No murmur heard.     No friction rub. No gallop.   Pulmonary:      Effort: Pulmonary effort is normal. No respiratory distress.      Breath sounds: No wheezing or rales.   Chest:      Chest wall: No tenderness.   Abdominal:      General: Bowel sounds are normal. There is no distension.      Palpations: Abdomen is soft.      Tenderness: There is no abdominal tenderness. There is no guarding or rebound.   Musculoskeletal:         General: Swelling and tenderness present.      Cervical back: No rigidity or tenderness.      Right lower leg: Edema present.      Left lower leg: Edema present.   Skin:     Findings: Erythema and lesion present.   Neurological:      General: No focal deficit present.      Mental Status: He is alert and oriented to person, place, and time.      Cranial Nerves: No cranial nerve deficit.      Motor: No weakness.      Coordination: Coordination normal.   Psychiatric:         Thought Content: Thought content normal.              CRANIAL NERVES     CN III, IV, VI   Pupils are equal, round, and reactive to light.       Significant Labs: All pertinent labs within the past 24 hours have been reviewed.  A1C:   Recent Labs   Lab 11/15/24  0956   HGBA1C 7.9*     Blood Culture: No results for input(s):  ""LABBLOO" in the last 48 hours.  BMP:   Recent Labs   Lab 11/26/24  1001   *      K 4.3      CO2 28   BUN 46*   CREATININE 3.4*   CALCIUM 8.4*     CBC:   Recent Labs   Lab 11/26/24  1001   WBC 11.23   HGB 11.6*   HCT 35.6*        CMP:   Recent Labs   Lab 11/26/24  1001      K 4.3      CO2 28   *   BUN 46*   CREATININE 3.4*   CALCIUM 8.4*   PROT 7.2   ALBUMIN 2.1*   BILITOT 0.5   ALKPHOS 132   AST 26   ALT 13   ANIONGAP 7*     Cardiac Markers: No results for input(s): "CKMB", "MYOGLOBIN", "BNP", "TROPISTAT" in the last 48 hours.  Coagulation: No results for input(s): "PT", "INR", "APTT" in the last 48 hours.  Lactic Acid: No results for input(s): "LACTATE" in the last 48 hours.  Troponin: No results for input(s): "TROPONINI", "TROPONINIHS" in the last 48 hours.  TSH: No results for input(s): "TSH" in the last 4320 hours.  Urine Culture: No results for input(s): "LABURIN" in the last 48 hours.  Urine Studies: No results for input(s): "COLORU", "APPEARANCEUA", "PHUR", "SPECGRAV", "PROTEINUA", "GLUCUA", "KETONESU", "BILIRUBINUA", "OCCULTUA", "NITRITE", "UROBILINOGEN", "LEUKOCYTESUR", "RBCUA", "WBCUA", "BACTERIA", "SQUAMEPITHEL", "HYALINECASTS" in the last 48 hours.    Invalid input(s): "WRIGHTSUR"  Recent Lab Results  (Last 5 results in the past 24 hours)        11/26/24  2041   11/26/24  1001   11/26/24  0955   11/26/24  0947   11/26/24  0849        Albumin   2.1             ALP   132             ALT   13             Anion Gap   7             AST   26             Baso #   0.03             Basophil %   0.3             BILIRUBIN TOTAL   0.5  Comment: For infants and newborns, interpretation of results should be based  on gestational age, weight and in agreement with clinical  observations.    Premature Infant recommended reference ranges:  Up to 24 hours.............<8.0 mg/dL  Up to 48 hours............<12.0 mg/dL  3-5 days..................<15.0 mg/dL  6-29 " days.................<15.0 mg/dL               BUN   46             Calcium   8.4             Chloride   101             CO2   28             Creatinine   3.4             CRP   88.1             Differential Method   Automated             eGFR   19             Eos #   0.1             Eos %   0.5             Glucose   172             Gran # (ANC)   8.8             Gran %   78.8             Hematocrit   35.6             Hemoglobin   11.6             Immature Grans (Abs)   0.09  Comment: Mild elevation in immature granulocytes is non specific and   can be seen in a variety of conditions including stress response,   acute inflammation, trauma and pregnancy. Correlation with other   laboratory and clinical findings is essential.               Immature Granulocytes   0.8             Lipase   <3             Lymph #   1.3             Lymph %   11.1             MCH   29.5             MCHC   32.6             MCV   91             Mono #   1.0             Mono %   8.5             MPV   9.8             nRBC   0             QRS Duration     150   146         [facility] QTC Calculation     521   499         Platelet Count   232             POCT Glucose 239         182       Potassium   4.3             PROTEIN TOTAL   7.2             RBC   3.93             RDW   13.2             Sodium   136             WBC   11.23                                    Significant Imaging: I have reviewed all pertinent imaging results/findings within the past 24 hours.  Assessment/Plan:     * Critical limb ischemia of left lower extremity  S/p angiogram  Cardiology and podiatry consult appreciate recommendation  Cont heparin drip      Stage 4 chronic kidney disease  Creatine stable for now. BMP reviewed- noted Estimated Creatinine Clearance: 24.7 mL/min (A) (based on SCr of 3.4 mg/dL (H)). according to latest data. Based on current GFR, CKD stage is stage 4 - GFR 15-29.  Monitor UOP and serial BMP and adjust therapy as needed. Renally dose meds. Avoid  nephrotoxic medications and procedures.    Insulin pump status  Pharmacy consult      Class 3 obesity  Weight management  S/p bariatric surgery      Hyperlipidemia associated with type 2 diabetes mellitus  Cont statin      Paroxysmal atrial fibrillation  Patient has persistent (7 days or more) atrial fibrillation. Patient is currently in atrial fibrillation. [id number] Score: 3. The patients heart rate in the last 24 hours is as follows:  Pulse  Min: 73  Max: 88     Antiarrhythmics       Anticoagulants  enoxaparin injection 30 mg, Every 24 hours, Subcutaneous  heparin infusion 1,000 units/500 ml in 0.9% NaCl (pressure line flush), Intra-op continuous PRN,     Plan  - Replete lytes with a goal of K>4, Mg >2  - Patient is anticoagulated, see medications listed above.  - Patient's afib is currently controlled  -         Type 2 diabetes mellitus with diabetic neuropathy, with long-term current use of insulin  Cont home meds      Coronary artery disease due to calcified coronary lesion  Patient with known CAD s/p stent placement, CABG which is uncontrolled Will continue ASA, Plavix, and Statin and monitor for S/Sx of angina/ACS. Continue to monitor on telemetry.       VTE Risk Mitigation (From admission, onward)           Ordered     enoxaparin injection 30 mg  Daily         11/26/24 1419     heparin infusion 1,000 units/500 ml in 0.9% NaCl (pressure line flush)  Intra-op continuous PRN         11/26/24 1450     IP VTE HIGH RISK PATIENT  Once         11/26/24 1419     Place sequential compression device  Until discontinued         11/26/24 1419                               Pharmacokinetic Initial Assessment: IV Vancomycin    Assessment/Plan:    Initiate intravenous vancomycin with loading dose of 2000 mg once with subsequent doses when random concentrations are less than 20 mcg/mL  Desired empiric serum trough concentration is 10 to 15 mcg/mL  Draw vancomycin random level on 11/27 at 1600.  Pharmacy will continue to  "follow and monitor vancomycin.      Please contact pharmacy at extension [phone] with any questions regarding this assessment.     Thank you for the consult,   Toya Severino       Patient brief summary:  Jian Arrieta is a 69 y.o. male initiated on antimicrobial therapy with IV Vancomycin for treatment of suspected skin & soft tissue infection    Drug Allergies:   Review of patient's allergies indicates:  No Known Allergies    Actual Body Weight:   122.5 kg     Renal Function:   Estimated Creatinine Clearance: 25.4 mL/min (A) (based on SCr of 3.4 mg/dL (H)).,       CBC (last 72 hours):  Recent Labs   Lab Result Units 11/26/24  1001   WBC K/uL 11.23   Hemoglobin g/dL 11.6*   Hematocrit % 35.6*   Platelets K/uL 232   Gran % % 78.8*   Lymph % % 11.1*   Mono % % 8.5   Eosinophil % % 0.5   Basophil % % 0.3   Differential Method  Automated       Metabolic Panel (last 72 hours):  Recent Labs   Lab Result Units 11/26/24  1001   Sodium mmol/L 136   Potassium mmol/L 4.3   Chloride mmol/L 101   CO2 mmol/L 28   Glucose mg/dL 172*   BUN mg/dL 46*   Creatinine mg/dL 3.4*   Albumin g/dL 2.1*   Total Bilirubin mg/dL 0.5   Alkaline Phosphatase U/L 132   AST U/L 26   ALT U/L 13       Drug levels (last 3 results):  No results for input(s): "VANCOMYCINRA", "VANCORANDOM", "VANCOMYCINPE", "VANCOPEAK", "VANCOMYCINTR", "VANCOTROUGH" in the last 72 hours.    Microbiologic Results:  Microbiology Results (last 7 days)       Procedure Component Value Units Date/Time    Blood culture #2 **CANNOT BE ORDERED STAT** [[id number]] Collected: 11/26/24 1001    Order Status: Sent Specimen: Blood from Peripheral, Antecubital, Right     Blood culture #1 **CANNOT BE ORDERED STAT** [[id number]]     Order Status: Sent Specimen: Blood              Time spent > 45 min    Marck Romero MD  Department of Hospital Medicine  [place] [facility]          "

## 2024-11-27 NOTE — PROGRESS NOTES
[facility]                   Pharmacy  Pharmacy Vancomycin/AG Sign-off    Therapy with vancomycin completed and/or consult discontinued by provider.  Pharmacy will sign off, please re-consult as needed. Thank you for allowing us to participate in this patient's care.     Art Griffith, PharmD    [phone]

## 2024-11-27 NOTE — PROGRESS NOTES
Pharmacist Renal Dose Adjustment Note    Jian Arrieta is a 69 y.o. male being treated with the medication cefepime    Patient Data:    Vital Signs (Most Recent):  Temp: 97.9 °F (36.6 °C) (11/27/24 0814)  Pulse: 79 (11/27/24 0814)  Resp: 18 (11/27/24 0814)  BP: (!) 123/59 (11/27/24 0814)  SpO2: 97 % (11/27/24 0814) Vital Signs (72h Range):  Temp:  [96.7 °F (35.9 °C)-98.7 °F (37.1 °C)]   Pulse:  [73-88]   Resp:  [16-20]   BP: (109-166)/(56-86)   SpO2:  [95 %-100 %]      Recent Labs   Lab 11/26/24  1001   CREATININE 3.4*     Serum creatinine: 3.4 mg/dL (H) 11/26/24 1001  Estimated creatinine clearance: 24.7 mL/min (A)    Medication:cefepime dose: 1gram frequency q24h will be changed to medication:cefepime dose:1gram frequency:q12h    Pharmacist's Name: Ratsa Griffith  Pharmacist's Extension: [phone]

## 2024-11-27 NOTE — SUBJECTIVE & OBJECTIVE
Past Medical History:   Diagnosis Date    Alcohol abuse     Anasarca 1/28/2019    Anemia     Anticoagulant long-term use     Arthropathy associated with neurological disorder 9/2/2015    Atherosclerosis     Charcot foot due to diabetes mellitus     Chronic combined systolic and diastolic heart failure 01/29/2019 1-28-19 Left VentricleModerate decreased ejection fraction at 30%. Normal left ventricular cavity size. Normal wall thickness observed. Grade I (mild) left ventricular diastolic dysfunction consistent with impaired relaxation. Normal left atrial pressure. Moderate, global hypokinesis(see wall scoring diagram). Right VentricleNormal cavity size, wall thickness and ejection fraction. Wall motion n    Chronic pancreatitis 1/28/2019    CKD (chronic kidney disease) stage 3, GFR 30-59 ml/min     CKD (chronic kidney disease) stage 4, GFR 15-29 ml/min     Colon polyps     approx 5 yrs ago    Coronary artery disease due to calcified coronary lesion 05/08/2015    5 stents on ASA      Diabetic polyneuropathy associated with type 2 diabetes mellitus 9/2/2015    Diverticulosis 1/28/2019    DM type 2 with diabetic peripheral neuropathy 2/4/2019    Encounter for blood transfusion     Essential hypertension 1/28/2019    Former smoker 8/26/2015    Healed ulcer of left foot on examination 6/20/2017    Hematuria     Hydrocele     approx 1.5 yrs ago    Hyperphosphatemia     Hypoalbuminemia 2/4/2019    Hypocalcemia     Lymphedema of both lower extremities 1/29/2019    Mixed hyperlipidemia 5/8/2015    Morbid obesity with BMI of 50.0-59.9, adult 5/8/2015    Obstruction of right ureteropelvic junction (UPJ) due to stone 5/24/2021    Onychomycosis of multiple toenails with type 2 diabetes mellitus and peripheral neuropathy 6/20/2017    Perianal cyst     approx 2 yrs ago    Proteinuria     Pseudocyst of pancreas 1/28/2019 1-28-19 Liver has a cirrhotic morphology with no focal lesions.  Significant interval increase in ascites  when compared to prior exam which may account for patient's abdominal distension.  Hypodense air-fluid collection along the body of the pancreas which is slightly smaller when compared to prior CT.  Findings may relate to pancreatic necrosis with pancreatic pseudocysts with infected pseudocyst    Skin cancer     skin cancer    Sleep apnea 8/26/2015    Status post bariatric surgery 1/11/2016    Type 2 diabetes mellitus, with long-term current use of insulin 5/8/2015    Urinary tract infection        Past Surgical History:   Procedure Laterality Date    ANGIOGRAPHY N/A 6/28/2019    Procedure: ANGIOGRAM-PV STENT;  Surgeon: Ewa Diagnostic Provider;  Location: [facility] OR;  Service: Radiology;  Laterality: N/A;    ANGIOPLASTY      total x5 stents    COLONOSCOPY N/A 10/6/2015    Procedure: COLONOSCOPY;  Surgeon: Shekhar Richards MD;  Location: [facility] (2ND FLR);  Service: Endoscopy;  Laterality: N/A;  BMI over 55/2nd floor case    5 day hold Plavix, Dr Kwadwo Arroyo    COLONOSCOPY N/A 5/13/2021    Procedure: COLONOSCOPY;  Surgeon: Huan Brumfield MD;  Location: [facility] ENDO;  Service: Endoscopy;  Laterality: N/A;    CORONARY ANGIOGRAPHY Right 3/20/2019    Procedure: ANGIOGRAM, CORONARY ARTERY;  Surgeon: Bob Duque MD;  Location: [facility] CATH LAB;  Service: Cardiology;  Laterality: Right;    CORONARY ARTERY BYPASS GRAFT  2017    x3    CORONARY BYPASS GRAFT ANGIOGRAPHY  3/20/2019    Procedure: Bypass graft study;  Surgeon: Bob Duque MD;  Location: [facility] CATH LAB;  Service: Cardiology;;    CYST REMOVAL      CYSTOSCOPY Right 6/30/2021    Procedure: CYSTOSCOPY;  Surgeon: William Diaz MD;  Location: [address];  Service: Urology;  Laterality: Right;    CYSTOSCOPY N/A 10/16/2023    Procedure: CYSTOSCOPY;  Surgeon: Chrystal Dias MD;  [address];  Service: Urology;  Laterality: N/A;    CYSTOSCOPY N/A 12/6/2023    Procedure: CYSTOSCOPY;  Surgeon: William Diaz MD;  Location: [address]  FLR;  Service: Urology;  Laterality: N/A;    CYSTOSCOPY W/ URETERAL STENT PLACEMENT Right 6/16/2021    Procedure: CYSTOSCOPY, WITH URETERAL STENT INSERTION;  Surgeon: William Diaz MD;  Location: [facility] OR 2ND FLR;  Service: Urology;  Laterality: Right;  FLUORO LESS THAN 1 HOUR    ENDOSCOPIC ULTRASOUND OF UPPER GASTROINTESTINAL TRACT N/A 2/26/2020    Procedure: ULTRASOUND, UPPER GI TRACT, ENDOSCOPIC;  Surgeon: Robbie Yang MD;  Location: [facility] ENDO;  Service: Endoscopy;  Laterality: N/A;    ESOPHAGOGASTRODUODENOSCOPY N/A 7/8/2019    Procedure: ESOPHAGOGASTRODUODENOSCOPY (EGD);  Surgeon: Huan Brumfield MD;  Location: [facility] ENDO;  Service: Endoscopy;  Laterality: N/A;    ESOPHAGOGASTRODUODENOSCOPY N/A 5/13/2021    Procedure: EGD (ESOPHAGOGASTRODUODENOSCOPY);  Surgeon: Huan Brumfield MD;  Location: [facility] ENDO;  Service: Endoscopy;  Laterality: N/A;    EXCISION OF HYDROCELE Bilateral 6/16/2021    Procedure: HYDROCELE REPAIR;  Surgeon: William Diaz MD;  Location: [facility] OR 2ND FLR;  Service: Urology;  Laterality: Bilateral;  2 HOURS    EXTRACTION - STONE Right 12/6/2023    Procedure: EXTRACTION - STONE;  Surgeon: William Diaz MD;  Location: [facility] OR 1ST FLR;  Service: Urology;  Laterality: Right;    FLUOROSCOPY N/A 6/16/2021    Procedure: FLUOROSCOPY;  Surgeon: William Diaz MD;  Location: [facility] OR 2ND FLR;  Service: Urology;  Laterality: N/A;    GASTRECTOMY      INSERTION OF DIALYSIS CATHETER N/A 5/17/2019    Procedure: pleurx;  Surgeon: [facility] Diagnostic Provider;  Location: [facility] OR 2ND FLR;  Service: General;  Laterality: N/A;  Room 188/[facility]    KNEE ARTHROSCOPY      LAPAROSCOPIC CHOLECYSTECTOMY N/A 5/4/2020    Procedure: CHOLECYSTECTOMY, LAPAROSCOPIC;  Surgeon: SON Rowe MD;  Location: [facility] OR;  Service: General;  Laterality: N/A;    LASER LITHOTRIPSY N/A 6/30/2021    Procedure: LITHOTRIPSY, USING LASER;  Surgeon: William Diaz MD;  Location: [facility] OR 1ST FLR;  Service: Urology;  Laterality: N/A;     LIVER BIOPSY N/A 5/4/2020    Procedure: BIOPSY, LIVER, Laproscopic ;  Surgeon: SON Rowe MD;  Location: [facility] OR;  Service: General;  Laterality: N/A;    perianal surgery      perianal cyst removed    PYELOSCOPY Right 6/30/2021    Procedure: PYELOSCOPY;  Surgeon: William Diaz MD;  Location: [facility] OR 1ST FLR;  Service: Urology;  Laterality: Right;    PYELOSCOPY Right 10/16/2023    Procedure: PYELOSCOPY;  Surgeon: Chrystal Dias MD;  Location: [facility] OR 1ST FLR;  Service: Urology;  Laterality: Right;    PYELOSCOPY Right 12/6/2023    Procedure: PYELOSCOPY;  Surgeon: William Diaz MD;  Location: [facility] OR 1ST FLR;  Service: Urology;  Laterality: Right;    REMOVAL-STENT Right 12/6/2023    Procedure: REMOVAL-STENT;  Surgeon: William Diaz MD;  Location: [facility] OR 1ST FLR;  Service: Urology;  Laterality: Right;    REPLACEMENT OF STENT Right 6/30/2021    Procedure: REPLACEMENT, STENT;  Surgeon: William Diaz MD;  Location: [facility] OR 1ST FLR;  Service: Urology;  Laterality: Right;    RETROGRADE PYELOGRAPHY Right 10/16/2023    Procedure: PYELOGRAM, RETROGRADE;  Surgeon: Chrystal Dias MD;  Location: [facility] OR 1ST FLR;  Service: Urology;  Laterality: Right;    RETROGRADE PYELOGRAPHY Right 12/6/2023    Procedure: PYELOGRAM, RETROGRADE;  Surgeon: William Diaz MD;  Location: [facility] OR 1ST FLR;  Service: Urology;  Laterality: Right;    URETERAL STENT PLACEMENT Right 10/16/2023    Procedure: INSERTION, STENT, URETER;  Surgeon: Chrystal Dias MD;  Location: [facility] OR 1ST FLR;  Service: Urology;  Laterality: Right;    URETEROSCOPY Right 6/30/2021    Procedure: URETEROSCOPY FLEXIBLE URETEROSCOPE;  Surgeon: William Diaz MD;  Location: [facility] OR 1ST FLR;  Service: Urology;  Laterality: Right;    URETEROSCOPY Right 10/16/2023    Procedure: URETEROSCOPY;  Surgeon: Chrystal Dias MD;  Location: [facility] OR 1ST FLR;  Service: Urology;  Laterality: Right;    URETEROSCOPY Right 12/6/2023    Procedure:  URETEROSCOPY;  Surgeon: William Diaz MD;  Location: [facility] OR [address] [place] [address];  Service: Urology;  Laterality: Right;       Review of patient's allergies indicates:  No Known Allergies    No current facility-administered medications on file prior to encounter.     Current Outpatient Medications on File Prior to Encounter   Medication Sig    acetaminophen (TYLENOL) 325 MG tablet Take 2 tablets (650 mg total) by mouth every 8 (eight) hours as needed for Pain.    amLODIPine (NORVASC) 10 MG tablet Take 1 tablet (10 mg total) by mouth once daily.    aspirin (ECOTRIN) 81 MG EC tablet Take 1 tablet (81 mg total) by mouth once daily. (Patient not taking: Reported on 1/4/2024)    blood pressure monitor Kit 1 kit by Misc.(Non-Drug; Combo Route) route 2 (two) times a day.    blood sugar diagnostic (ONETOUCH VERIO TEST STRIPS) Strp 1 each by Misc.(Non-Drug; Combo Route) route 2 (two) times a day.    blood-glucose sensor (DEXCOM G6 SENSOR) Sandi Inject 1 each into the skin every 10 days.    blood-glucose transmitter (DEXCOM G6 TRANSMITTER) Sandi Inject 1 each into the skin every 10 days.    glucagon (BAQSIMI) 3 mg/actuation Spry 1 each by Nasal route daily as needed (if glucose is less than 70 - can repeat in 1 hour if still low/less than 70).    HUMALOG KWIKPEN INSULIN 100 unit/mL pen Inject 5 Units into the skin before meals as needed (before each meal - if OFF of insulin pump). This is emergency back up insulin to use with meals if OFF of insulin pump    hydrALAZINE (APRESOLINE) 25 MG tablet Take 1 tablet (25 mg total) by mouth every 8 (eight) hours.    insulin detemir U-100, Levemir, 100 unit/mL (3 mL) SubQ InPn pen Inject 30 Units into the skin every evening. This is emergency back up insulin only if OFF of your insulin pump    insulin lispro-aabc (LYUMJEV U-100 INSULIN) 100 unit/mL Inject 80 Units into the skin continuous. Uses up to 80 units daily with omnipod 5 insulin pump as directed.    insulin pump cart,automated,BT (OMNIPOD  "5 G6 PODS, GEN 5,) Crtg INJECT 1 EACH INTO THE SKIN EVERY OTHER DAY.    lancets (ONETOUCH DELICA PLUS LANCET) 33 gauge Misc 1 lancet  by Misc.(Non-Drug; Combo Route) route 2 (two) times daily.    pen needle, diabetic 32 gauge x " Ndle Use with toujeo insulin one daily only as Emergency use/back up insulin - if OFF OF your insulin pump.    rosuvastatin (CRESTOR) 20 MG tablet Take 20 mg by mouth once daily.    torsemide (DEMADEX) 20 MG Tab Take 1 tablet (20 mg total) by mouth once daily.     Family History       Problem Relation (Age of Onset)    Cancer Mother, Father, Paternal Grandfather, Brother    Diabetes Maternal Grandmother    Heart disease Father    No Known Problems Paternal Grandmother    Obesity Sister    Parkinsonism Brother    Stroke Maternal Grandfather          Tobacco Use    Smoking status: Former     Current packs/day: 0.00     Average packs/day: 2.0 packs/day for 30.0 years (60.0 ttl pk-yrs)     Types: Cigarettes     Start date: 1975     Quit date: 2005     Years since quittin.8    Smokeless tobacco: Never   Substance and Sexual Activity    Alcohol use: No     Comment: started ~, reports 1 shot daily, max 3 shots daily, vague about alcohol consumption. Last drink 2018    Drug use: No    Sexual activity: Not on file     Review of Systems   Constitutional:  Positive for activity change. Negative for appetite change, chills and fever.   Respiratory:  Negative for cough, chest tightness, shortness of breath and wheezing.    Cardiovascular:  Positive for leg swelling. Negative for chest pain and palpitations.   Gastrointestinal:  Negative for abdominal pain, constipation, diarrhea, nausea and vomiting.   Musculoskeletal:  Positive for arthralgias and gait problem.   Skin:  Positive for color change, pallor and rash.   Neurological:  Negative for dizziness, speech difficulty, weakness and headaches.   Psychiatric/Behavioral:  Negative for agitation, confusion and hallucinations.  "   All other systems reviewed and are negative.    Objective:     Vital Signs (Most Recent):  Temp: 98.7 °F (37.1 °C) (11/26/24 1934)  Pulse: 78 (11/26/24 1934)  Resp: 18 (11/26/24 1934)  BP: (!) 109/56 (11/26/24 1934)  SpO2: 95 % (11/26/24 2020) Vital Signs (24h Range):  Temp:  [96.7 °F (35.9 °C)-98.7 °F (37.1 °C)] 98.7 °F (37.1 °C)  Pulse:  [73-88] 78  Resp:  [16-20] 18  SpO2:  [95 %-100 %] 95 %  BP: (109-166)/(56-86) 109/56     Weight: 116.8 kg (257 lb 8 oz)  Body mass index is 40.33 kg/m².     Physical Exam  Vitals and nursing note reviewed.   Constitutional:       General: He is not in acute distress.     Appearance: He is ill-appearing.   HENT:      Head: Normocephalic and atraumatic.      Nose: No congestion.      Mouth/Throat:      Mouth: Mucous membranes are moist.      Pharynx: No oropharyngeal exudate.   Eyes:      Extraocular Movements: Extraocular movements intact.      Pupils: Pupils are equal, round, and reactive to light.   Cardiovascular:      Rate and Rhythm: Normal rate. Rhythm irregular.      Heart sounds: No murmur heard.     No friction rub. No gallop.   Pulmonary:      Effort: Pulmonary effort is normal. No respiratory distress.      Breath sounds: No wheezing or rales.   Chest:      Chest wall: No tenderness.   Abdominal:      General: Bowel sounds are normal. There is no distension.      Palpations: Abdomen is soft.      Tenderness: There is no abdominal tenderness. There is no guarding or rebound.   Musculoskeletal:         General: Swelling and tenderness present.      Cervical back: No rigidity or tenderness.      Right lower leg: Edema present.      Left lower leg: Edema present.   Skin:     Findings: Erythema and lesion present.   Neurological:      General: No focal deficit present.      Mental Status: He is alert and oriented to person, place, and time.      Cranial Nerves: No cranial nerve deficit.      Motor: No weakness.      Coordination: Coordination normal.   Psychiatric:          "Thought Content: Thought content normal.              CRANIAL NERVES     CN III, IV, VI   Pupils are equal, round, and reactive to light.       Significant Labs: All pertinent labs within the past 24 hours have been reviewed.  A1C:   Recent Labs   Lab 11/15/24  0956   HGBA1C 7.9*     Blood Culture: No results for input(s): "LABBLOO" in the last 48 hours.  BMP:   Recent Labs   Lab 11/26/24  1001   *      K 4.3      CO2 28   BUN 46*   CREATININE 3.4*   CALCIUM 8.4*     CBC:   Recent Labs   Lab 11/26/24  1001   WBC 11.23   HGB 11.6*   HCT 35.6*        CMP:   Recent Labs   Lab 11/26/24  1001      K 4.3      CO2 28   *   BUN 46*   CREATININE 3.4*   CALCIUM 8.4*   PROT 7.2   ALBUMIN 2.1*   BILITOT 0.5   ALKPHOS 132   AST 26   ALT 13   ANIONGAP 7*     Cardiac Markers: No results for input(s): "CKMB", "MYOGLOBIN", "BNP", "TROPISTAT" in the last 48 hours.  Coagulation: No results for input(s): "PT", "INR", "APTT" in the last 48 hours.  Lactic Acid: No results for input(s): "LACTATE" in the last 48 hours.  Troponin: No results for input(s): "TROPONINI", "TROPONINIHS" in the last 48 hours.  TSH: No results for input(s): "TSH" in the last 4320 hours.  Urine Culture: No results for input(s): "LABURIN" in the last 48 hours.  Urine Studies: No results for input(s): "COLORU", "APPEARANCEUA", "PHUR", "SPECGRAV", "PROTEINUA", "GLUCUA", "KETONESU", "BILIRUBINUA", "OCCULTUA", "NITRITE", "UROBILINOGEN", "LEUKOCYTESUR", "RBCUA", "WBCUA", "BACTERIA", "SQUAMEPITHEL", "HYALINECASTS" in the last 48 hours.    Invalid input(s): "WRIGHTSUR"  Recent Lab Results  (Last 5 results in the past 24 hours)        11/26/24 2041 11/26/24  1001   11/26/24  0955   11/26/24  0947   11/26/24  0849        Albumin   2.1             ALP   132             ALT   13             Anion Gap   7             AST   26             Baso #   0.03             Basophil %   0.3             BILIRUBIN TOTAL   0.5  Comment: For " infants and newborns, interpretation of results should be based  on gestational age, weight and in agreement with clinical  observations.    Premature Infant recommended reference ranges:  Up to 24 hours.............<8.0 mg/dL  Up to 48 hours............<12.0 mg/dL  3-5 days..................<15.0 mg/dL  6-29 days.................<15.0 mg/dL               BUN   46             Calcium   8.4             Chloride   101             CO2   28             Creatinine   3.4             CRP   88.1             Differential Method   Automated             eGFR   19             Eos #   0.1             Eos %   0.5             Glucose   172             Gran # (ANC)   8.8             Gran %   78.8             Hematocrit   35.6             Hemoglobin   11.6             Immature Grans (Abs)   0.09  Comment: Mild elevation in immature granulocytes is non specific and   can be seen in a variety of conditions including stress response,   acute inflammation, trauma and pregnancy. Correlation with other   laboratory and clinical findings is essential.               Immature Granulocytes   0.8             Lipase   <3             Lymph #   1.3             Lymph %   11.1             MCH   29.5             MCHC   32.6             MCV   91             Mono #   1.0             Mono %   8.5             MPV   9.8             nRBC   0             QRS Duration     150   146         [facility] QTC Calculation     521   499         Platelet Count   232             POCT Glucose 239         182       Potassium   4.3             PROTEIN TOTAL   7.2             RBC   3.93             RDW   13.2             Sodium   136             WBC   11.23                                    Significant Imaging: I have reviewed all pertinent imaging results/findings within the past 24 hours.

## 2024-11-27 NOTE — HPI
70 YO M with PMHx significant for obesity, HTN, IDDM, HLD, CAD s/p stent, nephropathy diabetic, CHF,  presented to the ER for left foot 2nd toe gangrene that has stated few weeks ago. Per patient symptoms has started a week ago with rash from his thigh through his foot. Then he started to see that his left foot became more erythematous despite suffer from chronic stasis dermatitis/cellulitis, and blackness accentuated on his 2nd toe has changed color. He went to get a pedicure. While the cleaning the toe, the nail and skin fell off. He was advised to come to the ER. He also recalled that he had an episode of fever, chills which has subsided. Denies trauma, or previous wound. He was found to have left foot with critical limb ischemia. Labs in the ER  with CRP 88.1, BUN/Cr 46/3.4 eGFR 19, lower extremity doppler left no DVT, left lower extremity arterial doppler lack of flow involving the mid and distal aspect of the anterior and posterior tibial arteries. He was brought to the cath labs for further investigation.

## 2024-11-27 NOTE — ASSESSMENT & PLAN NOTE
Patient's FSGs are controlled on current medication regimen.  Last A1c reviewed-   Lab Results   Component Value Date    HGBA1C 7.9 (H) 11/15/2024     Most recent fingerstick glucose reviewed-   Recent Labs   Lab 11/26/24  2041 11/27/24  0551 11/27/24  1120   POCTGLUCOSE 239* 154* 277*     Current correctional scale  Low  Maintain anti-hyperglycemic dose as follows-   Antihyperglycemics (From admission, onward)      Start     Stop Route Frequency Ordered    11/27/24 1215  insulin aspart U-100 insulin pump from home        Question Answer Comment   Target number 110    Basal Rate #1 1.1    Basal rate #1 time 0000    Basal Rate #2 1.2    Basal rate #2 time 0700        -- SubQ Continuous 11/27/24 1108    11/27/24 1205  insulin aspart U-100 insulin pump from home 6-8 Units        Question Answer Comment   Target number 110    Carbohydrate coverage #1 Carb ratio set at 1:1 - patient enters 6-8 units for meal time    Carbohydrate coverage #1 time each meal    Sensitivity #1 40    Sensitivity #1 time per 24 hr        -- SubQ As needed (PRN) 11/27/24 1108          Hold Oral hypoglycemics while patient is in the hospital.  Patient using his own home insulin pump

## 2024-11-27 NOTE — SUBJECTIVE & OBJECTIVE
Review of Systems   Constitutional: Negative for chills, decreased appetite, diaphoresis, fever, malaise/fatigue, weight gain and weight loss.   Cardiovascular:  Negative for chest pain, claudication, dyspnea on exertion, irregular heartbeat, leg swelling, near-syncope, orthopnea, palpitations and paroxysmal nocturnal dyspnea.   Respiratory:  Negative for cough, shortness of breath, snoring, sputum production and wheezing.    Endocrine: Negative for cold intolerance, heat intolerance, polydipsia, polyphagia and polyuria.   Skin:  Positive for poor wound healing. Negative for color change, dry skin, itching and nail changes.   Musculoskeletal:  Negative for back pain, gout, joint pain and joint swelling.   Gastrointestinal:  Negative for bloating, abdominal pain, constipation, diarrhea, hematemesis, hematochezia, melena, nausea and vomiting.   Genitourinary:  Negative for dysuria, hematuria and nocturia.   Neurological:  Negative for dizziness, headaches, light-headedness, numbness, paresthesias and weakness.   Psychiatric/Behavioral:  Negative for altered mental status, depression and memory loss.      Objective:     Vital Signs (Most Recent):  Temp: 98.1 °F (36.7 °C) (11/27/24 1117)  Pulse: 82 (11/27/24 1117)  Resp: 18 (11/27/24 1117)  BP: (!) 103/56 (11/27/24 1117)  SpO2: 96 % (11/27/24 1117) Vital Signs (24h Range):  Temp:  [96.7 °F (35.9 °C)-98.7 °F (37.1 °C)] 98.1 °F (36.7 °C)  Pulse:  [73-88] 82  Resp:  [18-20] 18  SpO2:  [95 %-100 %] 96 %  BP: (103-166)/(56-86) 103/56     Weight: 116.8 kg (257 lb 8 oz)  Body mass index is 40.33 kg/m².     SpO2: 96 %         Intake/Output Summary (Last 24 hours) at 11/27/2024 1445  Last data filed at 11/27/2024 0919  Gross per 24 hour   Intake 476 ml   Output 500 ml   Net -24 ml       Lines/Drains/Airways       Drain  Duration                  Ureteral Drain/Stent 06/16/21 0912 Right ureter 24 Fr. 1260 days              Peripheral Intravenous Line  Duration                   Peripheral IV - Single Lumen 11/26/24 1000 20 G Right Antecubital 1 day                       Physical Exam  Constitutional:       General: He is not in acute distress.     Appearance: He is well-developed.   Cardiovascular:      Rate and Rhythm: Normal rate and regular rhythm.      Heart sounds: No murmur heard.     No gallop.   Pulmonary:      Effort: Pulmonary effort is normal. No respiratory distress.      Breath sounds: Normal breath sounds. No wheezing.   Abdominal:      General: Bowel sounds are normal. There is no distension.      Palpations: Abdomen is soft.      Tenderness: There is no abdominal tenderness.   Skin:     General: Skin is warm and dry.   Neurological:      Mental Status: He is alert and oriented to person, place, and time.            Significant Labs: BMP:   Recent Labs   Lab 11/26/24  1001 11/27/24  1347   * 153*    137   K 4.3 4.1    103   CO2 28 24   BUN 46* 46*   CREATININE 3.4* 3.4*   CALCIUM 8.4* 8.0*    and CBC   Recent Labs   Lab 11/26/24  1001   WBC 11.23   HGB 11.6*   HCT 35.6*          Significant Imaging: Echocardiogram: Transthoracic echo (TTE) complete (Cupid Only):   Results for orders placed or performed during the hospital encounter of 10/12/23   Echo   Result Value Ref Range    Ascending aorta 4.11 cm    STJ 3.59 cm    AV mean gradient 4 mmHg    Ao peak anthony 1.35 m/s    Ao VTI 26.85 cm    IVRT 65.65 msec    IVS 1.03 0.6 - 1.1 cm    LA size 3.21 cm    Left Atrium Major Axis 5.11 cm    Left Atrium Minor Axis 5.35 cm    LVIDd 5.24 3.5 - 6.0 cm    LVIDs 4.25 (A) 2.1 - 4.0 cm    LVOT diameter 2.50 cm    LVOT peak VTI 21.45 cm    PW 1.00 0.6 - 1.1 cm    MV Peak A Anthony 0.97 m/s    E wave deceleration time 205.82 msec    MV Peak E Anthony 0.66 m/s    PV Peak D Anthony 0.32 m/s    PV Peak S Anthony 0.42 m/s    RA Major Axis 4.92 cm    RA Width 3.79 cm    RVDD 3.97 cm    Sinus 4.13 cm    TAPSE 1.82 cm    TR Max Anthony 2.11 m/s    LA WIDTH 4.48 cm    MV stenosis pressure 1/2  "time 59.69 ms    LV Diastolic Volume 131.88 mL    LV Systolic Volume 80.71 mL    LVOT peak mini 1.09 m/s    TDI LATERAL 0.08 m/s    TDI SEPTAL 0.06 m/s    LA Vol (MOD) 82.37 cm3    MV "A" wave duration 16.56 msec    LV LATERAL E/E' RATIO 8.25 m/s    LV SEPTAL E/E' RATIO 11.00 m/s    FS 19 %    LA Vol 63.90 cm3    LV mass 200.58 g    ZLVIDD -5.89     ZLVIDS -2.30     Left Ventricle Relative Wall Thickness 0.38 cm    AV valve area 3.92 cm²    AV Velocity Ratio 0.81     AV index (prosthetic) 0.80     MV valve area p 1/2 method 3.69 cm2    E/A ratio 0.68     Mean e' 0.07 m/s    Pulm vein S/D ratio 1.31     LVOT area 4.9 cm2    LVOT stroke volume 105.24 cm3    AV peak gradient 7 mmHg    E/E' ratio 9.43 m/s    LV Systolic Volume Index 34.6 mL/m2    LV Diastolic Volume Index 56.60 mL/m2    BEAR 27.4 mL/m2    LV Mass Index 86 g/m2    Triscuspid Valve Regurgitation Peak Gradient 18 mmHg    BEAR (MOD) 35.4 mL/m2    MONICA by Velocity Ratio 3.96 cm²    BSA 2.45 m2    TV resting pulmonary artery pressure 21 mmHg    RV TB RVSP 5 mmHg    Est. RA pres 3 mmHg    Narrative      Left Ventricle: The left ventricle is normal in size. Normal wall   thickness. Septal motion is consistent with post-operative status. There   is low normal systolic function with a visually estimated ejection   fraction of 50 - 55%. There is normal diastolic function.    Right Ventricle: Normal right ventricular cavity size. Wall thickness   is normal. Right ventricle wall motion  is normal. Systolic function is   normal.    Pulmonary Artery: The estimated pulmonary artery systolic pressure is   21 mmHg.    IVC/SVC: Normal venous pressure at 3 mmHg.       "

## 2024-11-27 NOTE — ASSESSMENT & PLAN NOTE
Creatine stable for now. BMP reviewed- noted Estimated Creatinine Clearance: 24.7 mL/min (A) (based on SCr of 3.4 mg/dL (H)). according to latest data. Based on current GFR, CKD stage is stage 4 - GFR 15-29.  Monitor UOP and serial BMP and adjust therapy as needed. Renally dose meds. Avoid nephrotoxic medications and procedures.    Advanced CKD, consider Nephrology consult

## 2024-11-27 NOTE — SUBJECTIVE & OBJECTIVE
Interval History:  No acute events overnight.  Patient denies any complaints.  He lost IV access.  Infectious disease recommends switching antibiotics to oral.  Cardiology recommends medical management for PD.  Pending MRI per Podiatry recommendations.  Discussed with patient that it is good for him to have at least 1 PIV if he is staying in the hospital tonight.    Review of Systems  Objective:     Vital Signs (Most Recent):  Temp: 98.1 °F (36.7 °C) (11/27/24 1117)  Pulse: 82 (11/27/24 1117)  Resp: 18 (11/27/24 1117)  BP: (!) 103/56 (11/27/24 1117)  SpO2: 96 % (11/27/24 1117) Vital Signs (24h Range):  Temp:  [96.7 °F (35.9 °C)-98.7 °F (37.1 °C)] 98.1 °F (36.7 °C)  Pulse:  [73-88] 82  Resp:  [18-20] 18  SpO2:  [95 %-100 %] 96 %  BP: (103-166)/(56-86) 103/56     Weight: 116.8 kg (257 lb 8 oz)  Body mass index is 40.33 kg/m².    Intake/Output Summary (Last 24 hours) at 11/27/2024 1515  Last data filed at 11/27/2024 0919  Gross per 24 hour   Intake 476 ml   Output 500 ml   Net -24 ml         Physical Exam  Vitals and nursing note reviewed.   Constitutional:       General: He is not in acute distress.     Appearance: He is well-developed.   Cardiovascular:      Rate and Rhythm: Normal rate and regular rhythm.   Pulmonary:      Effort: Pulmonary effort is normal. No respiratory distress.   Abdominal:      Palpations: Abdomen is soft.      Tenderness: There is no abdominal tenderness.   Musculoskeletal:      Right lower leg: Edema present.      Left lower leg: Edema present.      Comments: Left 2nd toe gangrene with clear demarcation   Skin:     General: Skin is warm and dry.      Findings: No rash.      Comments: Chronic venous stasis changes bilateral lower extremity   Neurological:      Mental Status: He is alert and oriented to person, place, and time.             Significant Labs: All pertinent labs within the past 24 hours have been reviewed.    Significant Imaging: I have reviewed all pertinent imaging  results/findings within the past 24 hours.

## 2024-11-27 NOTE — PROGRESS NOTES
Pharmacokinetic Initial Assessment: IV Vancomycin    Assessment/Plan:    Initiate intravenous vancomycin with loading dose of 2000 mg once followed by a maintenance dose of vancomycin 1000 mg IV every 12 hours  Desired empiric serum trough concentration is 15 to 20 mcg/mL  Draw vancomycin trough level 30 min prior to fourth dose on 2/27/20 at approximately 0800   Pharmacy will continue to follow and monitor vancomycin.      Please contact pharmacy at extension [phone] with any questions regarding this assessment.     Thank you for the consult,   Toya Severino       Patient brief summary:  Jian Arrieta is a 64 y.o. male initiated on antimicrobial therapy with IV Vancomycin for treatment of suspected bacteremia    Drug Allergies:   Review of patient's allergies indicates:  No Known Allergies    Actual Body Weight:   106.5    Renal Function:   Estimated Creatinine Clearance: 60.2 mL/min (based on SCr of 1.4 mg/dL).,     Dialysis Method (if applicable):  N/A    CBC (last 72 hours):  Recent Labs   Lab Result Units 02/23/20  1238 02/23/20  1824 02/24/20  0542 02/25/20  0618   WBC K/uL 16.63*  --  18.81* 13.04*   Hemoglobin g/dL 13.7*  --  13.8* 12.5*   Hemoglobin A1C %  --  12.4*  --   --    Hematocrit % 41.2  --  41.6 38.1*   Platelets K/uL 166  --  181 149*   Gran% % 89.8*  --  83.3* 84.3*   Lymph% % 4.4*  --  8.9* 8.0*   Mono% % 5.1  --  6.7 6.1   Eosinophil% % 0.0  --  0.2 0.5   Basophil% % 0.2  --  0.2 0.3   Differential Method  Automated  --  Automated Automated       Metabolic Panel (last 72 hours):  Recent Labs   Lab Result Units 02/23/20  1238 02/24/20  0542 02/25/20  0618 02/25/20  1550   Sodium mmol/L 132* 136 133*  --    Potassium mmol/L 4.1 3.9 4.7  --    Chloride mmol/L 99 102 104  --    CO2 mmol/L 21* 27 19*  --    Glucose mg/dL 539* 129* 281*  --    Glucose, UA   --   --   --  2+*   BUN, Bld mg/dL 18 21 23  --    Creatinine mg/dL 1.7* 1.4 1.4  --    Albumin g/dL 3.2* 2.9* 2.5*  --    Total Bilirubin mg/dL  0.8 0.8 0.6  --    Alkaline Phosphatase U/L 394* 292* 268*  --    AST U/L 90* 38 26  --    ALT U/L 112* 70* 41  --    Magnesium mg/dL  --  1.8 1.7  --    Phosphorus mg/dL  --  1.8* 2.3*  --        Drug levels (last 3 results):  No results for input(s): VANCOMYCINRA, VANCOMYCINPE, VANCOMYCINTR in the last 72 hours.    Microbiologic Results:  Microbiology Results (last 7 days)       Procedure Component Value Units Date/Time    Blood culture [[id number]] Collected:  02/25/20 1555    Order Status:  Sent Specimen:  Blood Updated:  02/25/20 2477             None

## 2024-11-27 NOTE — PLAN OF CARE
Problem: Adult Inpatient Plan of Care  Goal: Plan of Care Review  Outcome: Progressing  Flowsheets (Taken 11/26/2024 2157)  Plan of Care Reviewed With: patient  Goal: Absence of Hospital-Acquired Illness or Injury  Outcome: Progressing  Intervention: Prevent Infection  Flowsheets (Taken 11/26/2024 2157)  Infection Prevention:   environmental surveillance performed   equipment surfaces disinfected   rest/sleep promoted   personal protective equipment utilized   hand hygiene promoted   single patient room provided     Problem: Diabetes Comorbidity  Goal: Blood Glucose Level Within Targeted Range  Outcome: Progressing  Intervention: Monitor and Manage Glycemia  Flowsheets (Taken 11/26/2024 2157)  Glycemic Management: blood glucose monitored

## 2024-11-27 NOTE — ASSESSMENT & PLAN NOTE
Pharmacy consult     6 Mm Punch Excision Text: A 6 mm punch biopsy was used to make an initial incision over the lesion.  After this overlying column of skin was removed, blunt dissection was used to free the lesion from the surrounding tissues and the lesion was extirpated through the surgical opening made by the punch biopsy.

## 2024-11-27 NOTE — ASSESSMENT & PLAN NOTE
"Patient has Combined Systolic and Diastolic heart failure that is Chronic. On presentation their CHF was well compensated. Most recent BNP and echo results are listed below.  No results for input(s): "BNP" in the last 72 hours.  Latest ECHO  Results for orders placed during the hospital encounter of 10/12/23    Echo    Interpretation Summary    Left Ventricle: The left ventricle is normal in size. Normal wall thickness. Septal motion is consistent with post-operative status. There is low normal systolic function with a visually estimated ejection fraction of 50 - 55%. There is normal diastolic function.    Right Ventricle: Normal right ventricular cavity size. Wall thickness is normal. Right ventricle wall motion  is normal. Systolic function is normal.    Pulmonary Artery: The estimated pulmonary artery systolic pressure is 21 mmHg.    IVC/SVC: Normal venous pressure at 3 mmHg.    Current Heart Failure Medications  furosemide tablet 80 mg, Daily, Oral    Plan  - Monitor strict I&Os and daily weights.    - Place on telemetry  - Low sodium diet  - Cardiology has been consulted  - The patient's volume status is at their baseline      "

## 2024-11-27 NOTE — PROGRESS NOTES
[facility]                          Pharmacy   New Medication Education    Patient and/or Caregiver ACCEPTED medication education.    Pharmacy has provided education on the name, indication, and possible side effects of the medication(s) prescribed, using teach-back method.     Learners of pharmacy medication education includes:  patient    The following medications have also been discussed, during this admission.     Current Facility-Administered Medications   Medication Frequency    amLODIPine tablet 5 mg Daily    aspirin EC tablet 81 mg Daily    atorvastatin tablet 80 mg QHS    ceFEPIme injection 1 g Q12H    clopidogreL tablet 75 mg Daily    dextrose 10% bolus 125 mL 125 mL PRN    dextrose 10% bolus 250 mL 250 mL PRN    enoxaparin injection 30 mg Daily    furosemide tablet 80 mg Daily    glucagon (human recombinant) injection 1 mg PRN    glucagon (human recombinant) injection 1 mg PRN    glucagon (human recombinant) injection 1 mg PRN    glucose chewable tablet 16 g PRN    glucose chewable tablet 16 g PRN    glucose chewable tablet 16 g PRN    glucose chewable tablet 24 g PRN    glucose chewable tablet 24 g PRN    glucose chewable tablet 24 g PRN    heparin infusion 1,000 units/500 ml in 0.9% NaCl (pressure line flush) Continuous PRN    influenza (adjuvanted) (Fluad) 45 mcg/0.5 mL IM vaccine (> or = 66 yo) 0.5 mL vaccine x 1 dose    insulin aspart U-100 insulin pump from home 6-8 Units PRN    insulin aspart U-100 insulin pump from home Continuous    isosorbide mononitrate 24 hr tablet 30 mg Daily    metroNIDAZOLE tablet 500 mg Q8H    naloxone 0.4 mg/mL injection 0.02 mg PRN    sodium chloride 0.9% flush 10 mL Q12H PRN    vancomycin - pharmacy to dose pharmacy to manage frequency    vancomycin 2 g in 0.9% sodium chloride 500 mL IVPB Once        Thank you  Rasta Griffith, PharmD

## 2024-11-27 NOTE — PLAN OF CARE
11/26/24 1840   Admission   Initial VN Admission Questions Complete   Communication Issues? Patient Hearing   Shift   Virtual Nurse - Patient Verbalized Approval Of Camera Use;VN Rounding   Safety/Activity   Patient Rounds bed in low position;placement of personal items at bedside;bed wheels locked;call light in patient/parent reach;toileting offered;visualized patient;clutter free environment maintained;ID band on   Safety Promotion/Fall Prevention assistive device/personal item within reach;Fall Risk reviewed with patient/family;side rails raised x 2;instructed to call staff for mobility   Positioning   Head of Bed (HOB) Positioning HOB at 20-30 degrees     VN cued into patient's room for introduction with patient's permission.  VN role explained and informed patient/family that VN would be working with bedside nurse and rest of care team.  Plan of care reviewed, pt encouraged to refer to whiteboard to determine staff for the day and pt/family educated on VTE,  fall risk and advised to call for assistance at all times to ensure pt's safety. Pt is without any signs of pain, anxiety, dyspnea or nausea. Patient's chart, labs and vital signs reviewed.  Allowed time for questions.  Will continue to be available as needed.

## 2024-11-27 NOTE — ASSESSMENT & PLAN NOTE
Patient has persistent (7 days or more) atrial fibrillation. Patient is currently in atrial fibrillation. [id number] Score: 3. The patients heart rate in the last 24 hours is as follows:  Pulse  Min: 73  Max: 88     Antiarrhythmics       Anticoagulants  enoxaparin injection 30 mg, Every 24 hours, Subcutaneous  heparin infusion 1,000 units/500 ml in 0.9% NaCl (pressure line flush), Intra-op continuous PRN,     Plan  - Replete lytes with a goal of K>4, Mg >2  - Patient is anticoagulated, see medications listed above.  - Patient's afib is currently controlled  -

## 2024-11-27 NOTE — ASSESSMENT & PLAN NOTE
Gangrene of left 2nd foot, suspected dry gangrene with no systemic signs of infection     Infectious disease consult-recommends transitioned to oral antibiotics-doxycycline for 7 days, stop IV antibiotics    Podiatry consult-recommend MRI foot which is pending    Cardiology recommended medical management for PAD -okay to stop Plavix if requires intervention by Podiatry

## 2024-11-27 NOTE — PROGRESS NOTES
[facility] Medicine  Progress Note    Patient Name: Jian Arrieta  MRN: [id number]  Patient Class: IP- Inpatient   Admission Date: 11/26/2024  Length of Stay: 1 days  Attending Physician: Sharifa Duque MD  Primary Care Provider: Leon Barajas DO        Subjective:     Principal Problem:Gangrene of toe of left foot        HPI:  68 YO M with PMHx significant for obesity, HTN, IDDM, HLD, CAD s/p stent, nephropathy diabetic, CHF,  presented to the ER for left foot 2nd toe gangrene that has stated few weeks ago. Per patient symptoms has started a week ago with rash from his thigh through his foot. Then he started to see that his left foot became more erythematous despite suffer from chronic stasis dermatitis/cellulitis, and blackness accentuated on his 2nd toe has changed color. He went to get a pedicure. While the cleaning the toe, the nail and skin fell off. He was advised to come to the ER. He also recalled that he had an episode of fever, chills which has subsided. Denies trauma, or previous wound. He was found to have left foot with critical limb ischemia. Labs in the ER  with CRP 88.1, BUN/Cr 46/3.4 eGFR 19, lower extremity doppler left no DVT, left lower extremity arterial doppler lack of flow involving the mid and distal aspect of the anterior and posterior tibial arteries. He was brought to the cath labs for further investigation.    Overview/Hospital Course:  No notes on file    Interval History:  No acute events overnight.  Patient denies any complaints.  He lost IV access.  Infectious disease recommends switching antibiotics to oral.  Cardiology recommends medical management for PD.  Pending MRI per Podiatry recommendations.  Discussed with patient that it is good for him to have at least 1 PIV if he is staying in the hospital tonight.    Review of Systems  Objective:     Vital Signs (Most Recent):  Temp: 98.1 °F (36.7 °C) (11/27/24 1117)  Pulse: 82 (11/27/24 1117)  Resp: 18  (11/27/24 1117)  BP: (!) 103/56 (11/27/24 1117)  SpO2: 96 % (11/27/24 1117) Vital Signs (24h Range):  Temp:  [96.7 °F (35.9 °C)-98.7 °F (37.1 °C)] 98.1 °F (36.7 °C)  Pulse:  [73-88] 82  Resp:  [18-20] 18  SpO2:  [95 %-100 %] 96 %  BP: (103-166)/(56-86) 103/56     Weight: 116.8 kg (257 lb 8 oz)  Body mass index is 40.33 kg/m².    Intake/Output Summary (Last 24 hours) at 11/27/2024 1515  Last data filed at 11/27/2024 0919  Gross per 24 hour   Intake 476 ml   Output 500 ml   Net -24 ml         Physical Exam  Vitals and nursing note reviewed.   Constitutional:       General: He is not in acute distress.     Appearance: He is well-developed.   Cardiovascular:      Rate and Rhythm: Normal rate and regular rhythm.   Pulmonary:      Effort: Pulmonary effort is normal. No respiratory distress.   Abdominal:      Palpations: Abdomen is soft.      Tenderness: There is no abdominal tenderness.   Musculoskeletal:      Right lower leg: Edema present.      Left lower leg: Edema present.      Comments: Left 2nd toe gangrene with clear demarcation   Skin:     General: Skin is warm and dry.      Findings: No rash.      Comments: Chronic venous stasis changes bilateral lower extremity   Neurological:      Mental Status: He is alert and oriented to person, place, and time.             Significant Labs: All pertinent labs within the past 24 hours have been reviewed.    Significant Imaging: I have reviewed all pertinent imaging results/findings within the past 24 hours.    Assessment/Plan:      * Gangrene of toe of left foot  Gangrene of left 2nd foot, suspected dry gangrene with no systemic signs of infection     Infectious disease consult-recommends transitioned to oral antibiotics-doxycycline for 7 days, stop IV antibiotics    Podiatry consult-recommend MRI foot which is pending    Cardiology recommended medical management for PAD -okay to stop Plavix if requires intervention by Podiatry      Critical limb ischemia of left lower  "extremity  S/p peripheral angiogram, medical management recommended by Cardiology  On aspirin, Plavix, statin    Chronic combined systolic and diastolic congestive heart failure  Patient has Combined Systolic and Diastolic heart failure that is Chronic. On presentation their CHF was well compensated. Most recent BNP and echo results are listed below.  No results for input(s): "BNP" in the last 72 hours.  Latest ECHO  Results for orders placed during the hospital encounter of 10/12/23    Echo    Interpretation Summary    Left Ventricle: The left ventricle is normal in size. Normal wall thickness. Septal motion is consistent with post-operative status. There is low normal systolic function with a visually estimated ejection fraction of 50 - 55%. There is normal diastolic function.    Right Ventricle: Normal right ventricular cavity size. Wall thickness is normal. Right ventricle wall motion  is normal. Systolic function is normal.    Pulmonary Artery: The estimated pulmonary artery systolic pressure is 21 mmHg.    IVC/SVC: Normal venous pressure at 3 mmHg.    Current Heart Failure Medications  furosemide tablet 80 mg, Daily, Oral    Plan  - Monitor strict I&Os and daily weights.    - Place on telemetry  - Low sodium diet  - Cardiology has been consulted  - The patient's volume status is at their baseline        Stage 4 chronic kidney disease  Creatine stable for now. BMP reviewed- noted Estimated Creatinine Clearance: 24.7 mL/min (A) (based on SCr of 3.4 mg/dL (H)). according to latest data. Based on current GFR, CKD stage is stage 4 - GFR 15-29.  Monitor UOP and serial BMP and adjust therapy as needed. Renally dose meds. Avoid nephrotoxic medications and procedures.    Advanced CKD, consider Nephrology consult    Insulin pump status  Pharmacy consult      Hx of CABG  Continue aspirin/Plavix, statin      Class 3 obesity  Weight management  S/p bariatric surgery      Hyperlipidemia associated with type 2 diabetes " mellitus  Cont statin      Paroxysmal atrial fibrillation  Patient has persistent (7 days or more) atrial fibrillation. Patient is currently in atrial fibrillation. [id number] Score: 3. The patients heart rate in the last 24 hours is as follows:  Pulse  Min: 73  Max: 88     Antiarrhythmics       Anticoagulants  enoxaparin injection 30 mg, Every 24 hours, Subcutaneous  heparin infusion 1,000 units/500 ml in 0.9% NaCl (pressure line flush), Intra-op continuous PRN,     Plan  - Replete lytes with a goal of K>4, Mg >2  - Patient is anticoagulated, see medications listed above.  - Patient's afib is currently controlled  -         Type 2 diabetes mellitus with diabetic neuropathy, with long-term current use of insulin        Sleep apnea  CPAP q.h.s.      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  Last A1c reviewed-   Lab Results   Component Value Date    HGBA1C 7.9 (H) 11/15/2024     Most recent fingerstick glucose reviewed-   Recent Labs   Lab 11/26/24  2041 11/27/24  0551 11/27/24  1120   POCTGLUCOSE 239* 154* 277*     Current correctional scale  Low  Maintain anti-hyperglycemic dose as follows-   Antihyperglycemics (From admission, onward)      Start     Stop Route Frequency Ordered    11/27/24 1215  insulin aspart U-100 insulin pump from home        Question Answer Comment   Target number 110    Basal Rate #1 1.1    Basal rate #1 time 0000    Basal Rate #2 1.2    Basal rate #2 time 0700        -- SubQ Continuous 11/27/24 1108    11/27/24 1205  insulin aspart U-100 insulin pump from home 6-8 Units        Question Answer Comment   Target number 110    Carbohydrate coverage #1 Carb ratio set at 1:1 - patient enters 6-8 units for meal time    Carbohydrate coverage #1 time each meal    Sensitivity #1 40    Sensitivity #1 time per 24 hr        -- SubQ As needed (PRN) 11/27/24 1108          Hold Oral hypoglycemics while patient is in the  hospital.  Patient using his own home insulin pump    Coronary artery disease due to calcified coronary lesion  Patient with known CAD s/p stent placement, CABG which is uncontrolled Will continue ASA, Plavix, and Statin and monitor for S/Sx of angina/ACS. Continue to monitor on telemetry.       VTE Risk Mitigation (From admission, onward)           Ordered     enoxaparin injection 30 mg  Daily         11/26/24 1419     heparin infusion 1,000 units/500 ml in 0.9% NaCl (pressure line flush)  Intra-op continuous PRN         11/26/24 1450     IP VTE HIGH RISK PATIENT  Once         11/26/24 1419     Place sequential compression device  Until discontinued         11/26/24 1419                    Discharge Planning   [place]:      Code Status: Full Code   Is the patient medically ready for discharge?:     Reason for patient still in hospital (select all that apply): Patient trending condition, Treatment, and Consult recommendations  Discharge Plan A: Home with family            Sharifa Duque MD  Department of Hospital Medicine   [facility]

## 2024-11-27 NOTE — CONSULTS
General Infectious Diseases: Consult Note    Consult Requested By: Sharifa Duque MD    Reason for Consult: cellulitis associated with CLI      IMPRESSION:  70yo man w/a history of CAD (s/p CABG x4 + PCI), HTN, HFpEF, HLD, IDDM (A1c=7.6%; c/b neuropathy, DFU left foot), CKD (baseline Cr=2.8-3.0), nephrolithiasis, portal vein stenosis (s/p stent 2019), obesity (BMI=40), and lymphedema (L>R, site of SVG harvest) who was admitted on 11/26/2024 with 2wks of progressive left leg/foot redness and swelling followed by acute onset fever and dusky, ischemic changes to the second toe due to CLI/dry gangrene with associated surrounding cellulitis of proximal toe (suspect changes of shin reflect uncontrolled lymphedema more since patient notes it is always worse on this side due to SVG harvest site). He has been afebrile since admission w/o evidence of osteomyelitis per XR or wet gangrenous changes of the toe.    - would stop IV antibiotics (risk of harm to kidneys in setting of CKD with no obvious gain is great)  - would transition to doxycycline 100mg PO q12h for 7 days for superimposed cellulitis  - care of dry gangrene and timing of amputation deferred to podiatry  - medical management of PAD per interventional cardiology  - follow-up care per the above services    Thanks for the consult. ID will sign off.    Ana Cheng MD  ID Attending  [phone]    SUBJECTIVE:     History of Present Illness:  Mr. Arrieta is a 70yo man w/a history of CAD (s/p CABG x4 + PCI), HTN, HFpEF, HLD, IDDM (A1c=7.6%; c/b neuropathy, DFU left foot), CKD (baseline Cr=2.8-3.0), nephrolithiasis, portal vein stenosis (s/p stent 2019), obesity (BMI=40), and lymphedema (L>R, site of SVG harvest) who was admitted on 11/26/2024 with 2wks of progressive left leg/foot redness and swelling followed by acute onset fever and dusky, ischemic changes to the second toe due to CLI with associated surrounding cellulitis. He was started on empiric IV antibiotics  and underwent angiography overnight that demonstrated long segment occlusion of the mid/distal PTA with reconstitution distally via collaterals (with only medical management of his PAD recommended). ID has been consulted to tailor antibiotics for his cellulitis overlying dry gangrene of the toe. Patient denies further F/C/S, pain, purulent drainage, or change in his leg redness which he attributes to lymphedema. He is not adherent with wearing compression stockings. He has been afebrile since admission on empiric vanc/cefepime.     Past Medical History:  Past Medical History:   Diagnosis Date    Alcohol abuse     Anasarca 1/28/2019    Anemia     Anticoagulant long-term use     Arthropathy associated with neurological disorder 9/2/2015    Atherosclerosis     Charcot foot due to diabetes mellitus     Chronic combined systolic and diastolic heart failure 01/29/2019 1-28-19 Left VentricleModerate decreased ejection fraction at 30%. Normal left ventricular cavity size. Normal wall thickness observed. Grade I (mild) left ventricular diastolic dysfunction consistent with impaired relaxation. Normal left atrial pressure. Moderate, global hypokinesis(see wall scoring diagram). Right VentricleNormal cavity size, wall thickness and ejection fraction. Wall motion n    Chronic pancreatitis 1/28/2019    CKD (chronic kidney disease) stage 3, GFR 30-59 ml/min     CKD (chronic kidney disease) stage 4, GFR 15-29 ml/min     Colon polyps     approx 5 yrs ago    Coronary artery disease due to calcified coronary lesion 05/08/2015    5 stents on ASA      Diabetic polyneuropathy associated with type 2 diabetes mellitus 9/2/2015    Diverticulosis 1/28/2019    DM type 2 with diabetic peripheral neuropathy 2/4/2019    Encounter for blood transfusion     Essential hypertension 1/28/2019    Former smoker 8/26/2015    Healed ulcer of left foot on examination 6/20/2017    Hematuria     Hydrocele     approx 1.5 yrs ago    Hyperphosphatemia      Hypoalbuminemia 2/4/2019    Hypocalcemia     Lymphedema of both lower extremities 1/29/2019    Mixed hyperlipidemia 5/8/2015    Morbid obesity with BMI of 50.0-59.9, adult 5/8/2015    Obstruction of right ureteropelvic junction (UPJ) due to stone 5/24/2021    Onychomycosis of multiple toenails with type 2 diabetes mellitus and peripheral neuropathy 6/20/2017    Perianal cyst     approx 2 yrs ago    Proteinuria     Pseudocyst of pancreas 1/28/2019 1-28-19 Liver has a cirrhotic morphology with no focal lesions.  Significant interval increase in ascites when compared to prior exam which may account for patient's abdominal distension.  Hypodense air-fluid collection along the body of the pancreas which is slightly smaller when compared to prior CT.  Findings may relate to pancreatic necrosis with pancreatic pseudocysts with infected pseudocyst    Skin cancer     skin cancer    Sleep apnea 8/26/2015    Status post bariatric surgery 1/11/2016    Type 2 diabetes mellitus, with long-term current use of insulin 5/8/2015    Urinary tract infection        Past Surgical History:  Past Surgical History:   Procedure Laterality Date    ANGIOGRAPHY N/A 6/28/2019    Procedure: ANGIOGRAM-PV STENT;  Surgeon: Ewa Diagnostic Provider;  Location: [facility] OR;  Service: Radiology;  Laterality: N/A;    ANGIOPLASTY      total x5 stents    COLONOSCOPY N/A 10/6/2015    Procedure: COLONOSCOPY;  Surgeon: Shekhar Richards MD;  Location: [facility] ENDO (2ND FLR);  Service: Endoscopy;  Laterality: N/A;  BMI over 55/2nd floor case    5 day hold Plavix, Dr Kwadwo Arroyo    COLONOSCOPY N/A 5/13/2021    Procedure: COLONOSCOPY;  Surgeon: Huan Brumfield MD;  Location: [facility] ENDO;  Service: Endoscopy;  Laterality: N/A;    CORONARY ANGIOGRAPHY Right 3/20/2019    Procedure: ANGIOGRAM, CORONARY ARTERY;  Surgeon: Bob Duque MD;  Location: [facility] CATH LAB;  Service: Cardiology;  Laterality: Right;    CORONARY ARTERY BYPASS GRAFT  2017    x3    CORONARY BYPASS  GRAFT ANGIOGRAPHY  3/20/2019    Procedure: Bypass graft study;  Surgeon: Bob Duque MD;  Location: [facility] CATH LAB;  Service: Cardiology;;    CYST REMOVAL      CYSTOSCOPY Right 6/30/2021    Procedure: CYSTOSCOPY;  Surgeon: William Diaz MD;  Location: [facility] OR 1ST FLR;  Service: Urology;  Laterality: Right;    CYSTOSCOPY N/A 10/16/2023    Procedure: CYSTOSCOPY;  Surgeon: Chrystal Dias MD;  Location: [facility] OR 1ST FLR;  Service: Urology;  Laterality: N/A;    CYSTOSCOPY N/A 12/6/2023    Procedure: CYSTOSCOPY;  Surgeon: William Diaz MD;  [facility];  Service: Urology;  Laterality: N/A;    CYSTOSCOPY W/ URETERAL STENT PLACEMENT Right 6/16/2021    Procedure: CYSTOSCOPY, WITH URETERAL STENT INSERTION;  Surgeon: William Diaz MD;  [facility];  Service: Urology;  Laterality: Right;  FLUORO LESS THAN 1 HOUR    ENDOSCOPIC ULTRASOUND OF UPPER GASTROINTESTINAL TRACT N/A 2/26/2020    Procedure: ULTRASOUND, UPPER GI TRACT, ENDOSCOPIC;  Surgeon: Robbie Yang MD;  Location: [facility];  Service: Endoscopy;  Laterality: N/A;    ESOPHAGOGASTRODUODENOSCOPY N/A 7/8/2019    Procedure: ESOPHAGOGASTRODUODENOSCOPY (EGD);  Surgeon: Huan Brumfield MD;  Location: [facility];  Service: Endoscopy;  Laterality: N/A;    ESOPHAGOGASTRODUODENOSCOPY N/A 5/13/2021    Procedure: EGD (ESOPHAGOGASTRODUODENOSCOPY);  Surgeon: Huan Brumfield MD;  Location: [facility];  Service: Endoscopy;  Laterality: N/A;    EXCISION OF HYDROCELE Bilateral 6/16/2021    Procedure: HYDROCELE REPAIR;  Surgeon: William Diaz MD;  Location: [facility] OR 2ND FLR;  Service: Urology;  Laterality: Bilateral;  2 HOURS    EXTRACTION - STONE Right 12/6/2023    Procedure: EXTRACTION - STONE;  Surgeon: William Diaz MD;  [facility];  Service: Urology;  Laterality: Right;    FLUOROSCOPY N/A 6/16/2021    Procedure: FLUOROSCOPY;  Surgeon: William Diaz MD;  Location: [facility] OR [address] [place] [address];  Service: Urology;  Laterality: N/A;     GASTRECTOMY      INSERTION OF DIALYSIS CATHETER N/A 5/17/2019    Procedure: pleurx;  Surgeon: Ewa Diagnostic Provider;  Location: [facility] OR 2ND FLR;  Service: General;  Laterality: N/A;  Room 188/[place]    KNEE ARTHROSCOPY      LAPAROSCOPIC CHOLECYSTECTOMY N/A 5/4/2020    Procedure: CHOLECYSTECTOMY, LAPAROSCOPIC;  Surgeon: SON Rowe MD;  Location: [facility] OR;  Service: General;  Laterality: N/A;    LASER LITHOTRIPSY N/A 6/30/2021    Procedure: LITHOTRIPSY, USING LASER;  Surgeon: William Diaz MD;  Location: [facility] OR 1ST FLR;  Service: Urology;  Laterality: N/A;    LIVER BIOPSY N/A 5/4/2020    Procedure: BIOPSY, LIVER, Laproscopic ;  Surgeon: SON Rowe MD;  Location: [facility] OR;  Service: General;  Laterality: N/A;    perianal surgery      perianal cyst removed    PYELOSCOPY Right 6/30/2021    Procedure: PYELOSCOPY;  Surgeon: William Diaz MD;  Location: [facility] OR 1ST FLR;  Service: Urology;  Laterality: Right;    PYELOSCOPY Right 10/16/2023    Procedure: PYELOSCOPY;  Surgeon: Chrystal Dias MD;  Location: [facility] OR 1ST FLR;  Service: Urology;  Laterality: Right;    PYELOSCOPY Right 12/6/2023    Procedure: PYELOSCOPY;  Surgeon: William Diaz MD;  Location: [facility] OR 1ST FLR;  Service: Urology;  Laterality: Right;    REMOVAL-STENT Right 12/6/2023    Procedure: REMOVAL-STENT;  Surgeon: William Diaz MD;  Location: [facility] OR 1ST FLR;  Service: Urology;  Laterality: Right;    REPLACEMENT OF STENT Right 6/30/2021    Procedure: REPLACEMENT, STENT;  Surgeon: William Diaz MD;  Location: [facility] OR 1ST FLR;  Service: Urology;  Laterality: Right;    RETROGRADE PYELOGRAPHY Right 10/16/2023    Procedure: PYELOGRAM, RETROGRADE;  Surgeon: Chrystal Dias MD;  Location: [facility] OR 1ST FLR;  Service: Urology;  Laterality: Right;    RETROGRADE PYELOGRAPHY Right 12/6/2023    Procedure: PYELOGRAM, RETROGRADE;  Surgeon: William Diaz MD;  Location: [facility] OR [address] [place] [address];  Service: Urology;  Laterality: Right;     URETERAL STENT PLACEMENT Right 10/16/2023    Procedure: INSERTION, STENT, URETER;  Surgeon: Chrystal Dias MD;  [facility];  Service: Urology;  Laterality: Right;    URETEROSCOPY Right 2021    Procedure: URETEROSCOPY FLEXIBLE URETEROSCOPE;  Surgeon: William Diaz MD;  [facility];  Service: Urology;  Laterality: Right;    URETEROSCOPY Right 10/16/2023    Procedure: URETEROSCOPY;  Surgeon: Chrystal Dias MD;  Location: [facility] OR [facility] FLR;  Service: Urology;  Laterality: Right;    URETEROSCOPY Right 2023    Procedure: URETEROSCOPY;  Surgeon: William Diaz MD;  [facility];  Service: Urology;  Laterality: Right;       Family History:  Family History   Problem Relation Name Age of Onset    Cancer Mother      Cancer Father      Heart disease Father      Obesity Sister      Parkinsonism Brother      No Known Problems Paternal Grandmother      Cancer Paternal Grandfather      Cancer Brother      Diabetes Maternal Grandmother      Stroke Maternal Grandfather      Cirrhosis Neg Hx         Social History:  Social History     Tobacco Use    Smoking status: Former     Current packs/day: 0.00     Average packs/day: 2.0 packs/day for 30.0 years (60.0 ttl pk-yrs)     Types: Cigarettes     Start date: 1975     Quit date: 2005     Years since quittin.8    Smokeless tobacco: Never   Substance Use Topics    Alcohol use: No     Comment: started ~, reports 1 shot daily, max 3 shots daily, vague about alcohol consumption. Last drink 2018    Drug use: No       Allergies:  Review of patient's allergies indicates:  No Known Allergies     Pertinent Medications:  Antibiotics (From admission, onward)      Start     Stop Route Frequency Ordered    24 1400  doxycycline tablet 100 mg         24 0859 Oral Every 12 hours 24 1357              Review of Symptoms:  Constitutional: Denies further fevers, weight loss, chills, or weakness.  Eyes:  Denies changes in vision.  ENT: Denies dysphagia, nasal discharge, ear pain or discharge.  Cardiovascular: Denies chest pain, palpitations, orthopnea, or claudication.  Respiratory: Denies shortness of breath, cough, hemoptysis, or wheezing.  GI: Denies nausea/vomiting, hematochezia, melena, abd pain, or changes in appetite.  : Denies dysuria, incontinence, or hematuria.  Musculoskeletal: Denies joint pain or myalgias.  Skin/breast: Per HPI.  Neurologic: Denies headache, dizziness, vertigo, or paresthesias.  Psychiatric: Denies changes in mood or hallucinations.  Endocrine: Denies polyuria, polydipsia, heat/cold intolerance.  Hematologic/Lymph: Denies lymphadenopathy, easy bruising or easy bleeding.  Allergic/Immunologic: Denies rash, rhinitis.     OBJECTIVE:     Vital Signs (Most Recent)  Temp: 98.1 °F (36.7 °C) (11/27/24 1117)  Pulse: 82 (11/27/24 1117)  Resp: 18 (11/27/24 1117)  BP: (!) 103/56 (11/27/24 1117)  SpO2: 96 % (11/27/24 1117)    Temperature Range Min/Max (Last 24H):  Temp:  [96.7 °F (35.9 °C)-98.7 °F (37.1 °C)]     Physical Exam:  Gen: Nontoxic, comfortable, no acute distress.    HEENT: PERRL. No scleral icterus. EOMI intact. OP clear.  Pulmonary: Non labored. Clear to auscultation.   Cardiac: Normal rate and rhythm.   Abdomen: Normal bowel sounds. Soft, nontender, nondistended.   Extremities: No clubbing. Lymphedema L>RLE with chronic venous stasis changes.  Lymphatics: no cervical LAD.  Musculoskeletal: no joint deformities or effusions.  Neurological:  Alert and oriented.  CN II-XII grossly intact.   Skin: Dry gangrene overlying distal phalanx of left second toe; no maceration or superimposed infectious changes. Swelling and redness of digit not unexpected in setting of CLI. Lymphedema L>RLE with chronic venous stasis changes (expected worse on left due to SVG harvest site).      Laboratory:  CBC:   Lab Results   Component Value Date    WBC 11.23 11/26/2024    HGB 11.6 (L) 11/26/2024    HCT 35.6  (L) 11/26/2024    MCV 91 11/26/2024     11/26/2024       BMP:   Recent Labs   Lab 11/27/24  1347   *      K 4.1      CO2 24   BUN 46*   CREATININE 3.4*   CALCIUM 8.0*       LFT:   Lab Results   Component Value Date    ALT 13 11/26/2024    AST 26 11/26/2024     (H) 02/27/2020    ALKPHOS 132 11/26/2024    BILITOT 0.5 11/26/2024       Microbiology:  11/26 blood cx NGTD      Diagnostic Results:  RLE arterial US:  1. Peripheral arterial disease with focal areas of high-grade stenosis at the anterior and posterior tibial arteries.     LLE arterial US:  Lack of flow involving the mid and distal aspects of the anterior and posterior tibial arteries.     Remaining interrogated vessels appear patent, with waveform morphology suggesting peripheral arterial disease and or proximal steno-occlusive disease.     XR: no osteomyelitis    Angiogram BLE:  Findings:     Left lower extremity:  Common iliac artery:  no significant disease  External iliac artery: no significant disease  Internal iliac artery: no significant disease  Common femoral artery no significant disease  Superficial femoral artery:  mild diffuse calcified disease without any areas of focal stenosis  Profunda femoris artery:  no significant disease  Popliteal artery: no significant disease.  Popliteal artery branches into anterior tibial/peroneal/popliteal artery  Anterior tibial artery:  no significant disease.  Gives rise to DP at the dorsum of the foot and supplies the plantar arch  Posterior tibial artery:   long segment occlusion in the mid to distal posterior tibial artery.  Distal PT reconstitutes via collaterals from the peroneal artery  Peroneal artery:  no significant disease        Right lower extremity:  Common iliac artery:  no significant disease  External iliac artery:  no significant disease  Internal iliac artery:  no significant disease  Common femoral artery  no significant disease      Unable to assess right lower  extremity beyond distal SFA because of significant pain in right lower extremity  During CO2 angiography.   Patient does have triphasic DP on examination post procedure.  Recommend arterial ultrasound of right lower extremity if clinically warranted.    ASSESSMENT/PLAN:     Active Hospital Problems    Diagnosis  POA    *Critical limb ischemia of left lower extremity [I70.222]  Yes    Gangrene of toe of left foot [I96]  Yes    Stage 4 chronic kidney disease [N18.4]  Yes    Chronic combined systolic and diastolic congestive heart failure [I50.42]  Yes    Insulin pump status [Z96.41]  Not Applicable    Hx of CABG [Z95.1]  Not Applicable    Class 3 obesity [E66.813]  Yes    Other cirrhosis of liver [K74.69]  Yes    Hyperlipidemia associated with type 2 diabetes mellitus [E11.69, E78.5]  Yes     Chronic    Paroxysmal atrial fibrillation [I48.0]  Yes    Type 2 diabetes mellitus with diabetic neuropathy, with long-term current use of insulin [E11.40, Z79.4]  Not Applicable     Chronic    Status post bariatric surgery [Z98.84]  Not Applicable    Sleep apnea [G47.30]  Yes    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       Plan: Please see top of page

## 2024-11-27 NOTE — ASSESSMENT & PLAN NOTE
Patient with known CAD s/p stent placement, CABG which is uncontrolled Will continue ASA, Plavix, and Statin and monitor for S/Sx of angina/ACS. Continue to monitor on telemetry.

## 2024-11-27 NOTE — HOSPITAL COURSE
11/27/2024 LE angiogram yesterday with following results:     Findings:     Left lower extremity:  Common iliac artery:  no significant disease  External iliac artery: no significant disease  Internal iliac artery: no significant disease  Common femoral artery no significant disease  Superficial femoral artery:  mild diffuse calcified disease without any areas of focal stenosis  Profunda femoris artery:  no significant disease  Popliteal artery: no significant disease.  Popliteal artery branches into anterior tibial/peroneal/popliteal artery  Anterior tibial artery:  no significant disease.  Gives rise to DP at the dorsum of the foot and supplies the plantar arch  Posterior tibial artery:   long segment occlusion in the mid to distal posterior tibial artery.  Distal PT reconstitutes via collaterals from the peroneal artery  Peroneal artery:  no significant disease        Right lower extremity:  Common iliac artery:  no significant disease  External iliac artery:  no significant disease  Internal iliac artery:  no significant disease  Common femoral artery  no significant disease      Unable to assess right lower extremity beyond distal SFA because of significant pain in right lower extremity  During CO2 angiography.   Patient does have triphasic DP on examination post procedure.  Recommend arterial ultrasound of right lower extremity if clinically warranted.         Recommendations:      Angiosome supplying the 2nd left toe well-perfused via the anterior tibial/ dorsalis pedis artery.  Recommend medical therapy for mid  left posterior tibial artery occlusion with aspirin and high-intensity statin.    Right lower extremity arterial ultrasound if clinically warranted   No iodinated contrast used as a part of this procedure    RLE arterial ultrasound this AM. Left toe gangrenous. CBC and BMP reviewed

## 2024-11-27 NOTE — NURSING
Pt transferred to floor from cath lab. Pt alert and oriented upon arrival. Vitals taken and stable. Pt oriented to room set up and expectations. Call light in reach, safety measures in place.

## 2024-11-27 NOTE — ASSESSMENT & PLAN NOTE
Creatine stable for now. BMP reviewed- noted Estimated Creatinine Clearance: 24.7 mL/min (A) (based on SCr of 3.4 mg/dL (H)). according to latest data. Based on current GFR, CKD stage is stage 4 - GFR 15-29.  Monitor UOP and serial BMP and adjust therapy as needed. Renally dose meds. Avoid nephrotoxic medications and procedures.

## 2024-11-28 VITALS
TEMPERATURE: 98 F | DIASTOLIC BLOOD PRESSURE: 63 MMHG | RESPIRATION RATE: 18 BRPM | HEART RATE: 82 BPM | SYSTOLIC BLOOD PRESSURE: 114 MMHG | BODY MASS INDEX: 40.42 KG/M2 | WEIGHT: 257.5 LBS | HEIGHT: 67 IN | OXYGEN SATURATION: 97 %

## 2024-11-28 LAB
POCT GLUCOSE: 207 MG/DL (ref 70–110)
POCT GLUCOSE: 230 MG/DL (ref 70–110)

## 2024-11-28 PROCEDURE — 90471 IMMUNIZATION ADMIN: CPT | Performed by: STUDENT IN AN ORGANIZED HEALTH CARE EDUCATION/TRAINING PROGRAM

## 2024-11-28 PROCEDURE — 25000003 PHARM REV CODE 250: Performed by: STUDENT IN AN ORGANIZED HEALTH CARE EDUCATION/TRAINING PROGRAM

## 2024-11-28 PROCEDURE — 3E02340 INTRODUCTION OF INFLUENZA VACCINE INTO MUSCLE, PERCUTANEOUS APPROACH: ICD-10-PCS | Performed by: STUDENT IN AN ORGANIZED HEALTH CARE EDUCATION/TRAINING PROGRAM

## 2024-11-28 PROCEDURE — 90653 IIV ADJUVANT VACCINE IM: CPT | Performed by: STUDENT IN AN ORGANIZED HEALTH CARE EDUCATION/TRAINING PROGRAM

## 2024-11-28 PROCEDURE — G0008 ADMIN INFLUENZA VIRUS VAC: HCPCS | Performed by: STUDENT IN AN ORGANIZED HEALTH CARE EDUCATION/TRAINING PROGRAM

## 2024-11-28 PROCEDURE — 99231 SBSQ HOSP IP/OBS SF/LOW 25: CPT | Mod: ,,, | Performed by: PODIATRIST

## 2024-11-28 PROCEDURE — 63600175 PHARM REV CODE 636 W HCPCS: Performed by: STUDENT IN AN ORGANIZED HEALTH CARE EDUCATION/TRAINING PROGRAM

## 2024-11-28 RX ORDER — TORSEMIDE 20 MG/1
20 TABLET ORAL DAILY
Qty: 90 TABLET | Refills: 3 | Status: SHIPPED | OUTPATIENT
Start: 2024-11-28

## 2024-11-28 RX ORDER — CLOPIDOGREL BISULFATE 75 MG/1
75 TABLET ORAL DAILY
Qty: 90 TABLET | Refills: 3 | Status: SHIPPED | OUTPATIENT
Start: 2024-11-29

## 2024-11-28 RX ORDER — ASPIRIN 81 MG/1
81 TABLET ORAL DAILY
Qty: 90 TABLET | Refills: 3 | Status: SHIPPED | OUTPATIENT
Start: 2024-11-28 | End: 2025-11-28

## 2024-11-28 RX ORDER — DOXYCYCLINE HYCLATE 100 MG
100 TABLET ORAL EVERY 12 HOURS
Qty: 14 TABLET | Refills: 0 | Status: SHIPPED | OUTPATIENT
Start: 2024-11-28 | End: 2024-12-05

## 2024-11-28 RX ORDER — ISOSORBIDE MONONITRATE 30 MG/1
30 TABLET, EXTENDED RELEASE ORAL DAILY
Qty: 90 TABLET | Refills: 3 | Status: SHIPPED | OUTPATIENT
Start: 2024-11-29

## 2024-11-28 RX ADMIN — ONDANSETRON 4 MG: 4 TABLET, ORALLY DISINTEGRATING ORAL at 08:11

## 2024-11-28 RX ADMIN — ASPIRIN 81 MG: 81 TABLET, COATED ORAL at 08:11

## 2024-11-28 RX ADMIN — ISOSORBIDE MONONITRATE 30 MG: 30 TABLET, EXTENDED RELEASE ORAL at 08:11

## 2024-11-28 RX ADMIN — FUROSEMIDE 80 MG: 40 TABLET ORAL at 08:11

## 2024-11-28 RX ADMIN — INFLUENZA A VIRUS A/VICTORIA/4897/2022 IVR-238 (H1N1) ANTIGEN (FORMALDEHYDE INACTIVATED), INFLUENZA A VIRUS A/THAILAND/8/2022 IVR-237 (H3N2) ANTIGEN (FORMALDEHYDE INACTIVATED), INFLUENZA B VIRUS B/AUSTRIA/1359417/2021 BVR-26 ANTIGEN (FORMALDEHYDE INACTIVATED) 0.5 ML: 15; 15; 15 INJECTION, SUSPENSION INTRAMUSCULAR at 11:11

## 2024-11-28 RX ADMIN — CLOPIDOGREL BISULFATE 75 MG: 75 TABLET ORAL at 08:11

## 2024-11-28 RX ADMIN — DOXYCYCLINE HYCLATE 100 MG: 100 TABLET, COATED ORAL at 08:11

## 2024-11-28 NOTE — HOSPITAL COURSE
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  Underwent left lower extremity angiogram (carbon dioxide angiography, no iodinated contrast use) due to concern for critical limb ischemia.  Left 2nd toe noted to be well perfused via anterior tibial/dorsalis pedis artery.  Recommended medical management with aspirin/Plavix and statin.  Patient was seen by infectious disease and recommended to discontinue IV antibiotics given dry gangrene and no systemic signs of infection.  Placed on doxycycline for 7 days.  MRI showed acute osteomyelitis of 2nd middle and distal phalanges.  Podiatry recommended biopsy/ left 2nd toe amputation but patient declined.  Requested to be discharged and follow-up with Podiatry outpatient.  Per Podiatry okay to discharge patient and follow up outpatient in clinic.  Return precautions discussed.  Patient would also benefit from close Nephrology follow-up given worsening CKD.  At high-risk of readmission given multiple advanced comorbidities.    /63 (BP Location: Right arm, Patient Position: Lying)   Pulse 82   Temp 98 °F (36.7 °C) (Oral)   Resp 18   Ht 5' 7" (1.702 m)   Wt 116.8 kg (257 lb 8 oz)   SpO2 97%   BMI 40.33 kg/m²     Physical Exam  Vitals and nursing note reviewed.   Constitutional:       General: He is not in acute distress.     Appearance: He is well-developed.   Cardiovascular:      Rate and Rhythm: Normal rate and regular rhythm.   Pulmonary:      Effort: Pulmonary effort is normal. No respiratory distress.   Abdominal:      Palpations: Abdomen is soft.      Tenderness: There is no abdominal tenderness.   Musculoskeletal:      Right lower leg: " Edema present.      Left lower leg: Edema present.      Comments: Left 2nd toe gangrene with clear demarcation   Skin:     General: Skin is warm and dry.      Findings: No rash.      Comments: Chronic venous stasis changes bilateral lower extremity   Neurological:      Mental Status: He is alert and oriented to person, place, and time.

## 2024-11-28 NOTE — PLAN OF CARE
Problem: Adult Inpatient Plan of Care  Goal: Plan of Care Review  11/28/2024 1241 by Martina Dumont RN  Outcome: Met  11/28/2024 0903 by Martina Dumont RN  Outcome: Progressing  Goal: Patient-Specific Goal (Individualized)  Outcome: Met  Goal: Absence of Hospital-Acquired Illness or Injury  Outcome: Met  Goal: Optimal Comfort and Wellbeing  Outcome: Met  Goal: Readiness for Transition of Care  Outcome: Met     Problem: Bariatric Environmental Safety  Goal: Safety Maintained with Care  Outcome: Met     Problem: Diabetes Comorbidity  Goal: Blood Glucose Level Within Targeted Range  Outcome: Met     Problem: Wound  Goal: Optimal Coping  Outcome: Met  Goal: Optimal Functional Ability  Outcome: Met  Goal: Absence of Infection Signs and Symptoms  Outcome: Met  Goal: Improved Oral Intake  Outcome: Met  Goal: Optimal Pain Control and Function  Outcome: Met  Goal: Skin Health and Integrity  Outcome: Met  Goal: Optimal Wound Healing  Outcome: Met     Problem: Cardiac Catheterization (Diagnostic/Interventional)  Goal: Absence of Bleeding  Outcome: Met  Goal: Absence of Contrast-Induced Injury  Outcome: Met  Goal: Stable Heart Rate and Rhythm  Outcome: Met  Goal: Absence of Embolism Signs and Symptoms  Outcome: Met  Goal: Anesthesia/Sedation Recovery  Outcome: Met  Goal: Optimal Pain Control and Function  Outcome: Met  Goal: Absence of Vascular Access Complication  Outcome: Met     Problem: Fall Injury Risk  Goal: Absence of Fall and Fall-Related Injury  Outcome: Met

## 2024-11-28 NOTE — DISCHARGE SUMMARY
[facility] Medicine  Discharge Summary      Patient Name: Jian Arrieta  MRN: [id number]  SRIKANTH: [id number]  Patient Class: IP- Inpatient  Admission Date: 11/26/2024  Hospital Length of Stay: 2 days  Discharge Date and Time: 11/28/2024  2:43 PM  Attending Physician: No att. providers found   Discharging Provider: Sharifa Duque MD  Primary Care Provider: Reza Boyer MD    Primary Care Team: Networked reference to record PCT     HPI:   68 YO M with PMHx significant for obesity, HTN, IDDM, HLD, CAD s/p stent, nephropathy diabetic, CHF,  presented to the ER for left foot 2nd toe gangrene that has stated few weeks ago. Per patient symptoms has started a week ago with rash from his thigh through his foot. Then he started to see that his left foot became more erythematous despite suffer from chronic stasis dermatitis/cellulitis, and blackness accentuated on his 2nd toe has changed color. He went to get a pedicure. While the cleaning the toe, the nail and skin fell off. He was advised to come to the ER. He also recalled that he had an episode of fever, chills which has subsided. Denies trauma, or previous wound. He was found to have left foot with critical limb ischemia. Labs in the ER  with CRP 88.1, BUN/Cr 46/3.4 eGFR 19, lower extremity doppler left no DVT, left lower extremity arterial doppler lack of flow involving the mid and distal aspect of the anterior and posterior tibial arteries. He was brought to the cath labs for further investigation.    Procedure(s) (LRB):  AORTOGRAM, WITH SERIALOGRAPHY (N/A)      Hospital Course:   69-year-old male with PMH of obesity s/p gastric bypass, latent autoimmune diabetes of adult onset treated as type 1 diabetes, CAD s/p CABG and stent, CKD stage IV, paroxysmal atrial fibrillation, chronic combined systolic and diastolic heart failure, BERHANE on CPAP, large pancreatic pseudocyst sent from the podiatry clinic to the ER for left 2nd toe gangrene.  Underwent left  "lower extremity angiogram (carbon dioxide angiography, no iodinated contrast use) due to concern for critical limb ischemia.  Left 2nd toe noted to be well perfused via anterior tibial/dorsalis pedis artery.  Recommended medical management with aspirin/Plavix and statin.  Patient was seen by infectious disease and recommended to discontinue IV antibiotics given dry gangrene and no systemic signs of infection.  Placed on doxycycline for 7 days.  MRI showed acute osteomyelitis of 2nd middle and distal phalanges.  Podiatry recommended biopsy/ left 2nd toe amputation but patient declined.  Requested to be discharged and follow-up with Podiatry outpatient.  Per Podiatry okay to discharge patient and follow up outpatient in clinic.  Return precautions discussed.  Patient would also benefit from close Nephrology follow-up given worsening CKD.  At high-risk of readmission given multiple advanced comorbidities.    /63 (BP Location: Right arm, Patient Position: Lying)   Pulse 82   Temp 98 °F (36.7 °C) (Oral)   Resp 18   Ht 5' 7" (1.702 m)   Wt 116.8 kg (257 lb 8 oz)   SpO2 97%   BMI 40.33 kg/m²     Physical Exam  Vitals and nursing note reviewed.   Constitutional:       General: He is not in acute distress.     Appearance: He is well-developed.   Cardiovascular:      Rate and Rhythm: Normal rate and regular rhythm.   Pulmonary:      Effort: Pulmonary effort is normal. No respiratory distress.   Abdominal:      Palpations: Abdomen is soft.      Tenderness: There is no abdominal tenderness.   Musculoskeletal:      Right lower leg: Edema present.      Left lower leg: Edema present.      Comments: Left 2nd toe gangrene with clear demarcation   Skin:     General: Skin is warm and dry.      Findings: No rash.      Comments: Chronic venous stasis changes bilateral lower extremity   Neurological:      Mental Status: He is alert and oriented to person, place, and time.      Goals of Care Treatment Preferences:  Code " Status: Full Code      SDOH Screening:  The patient declined to be screened for utility difficulties, food insecurity, transport difficulties, housing insecurity, and interpersonal safety, so no concerns could be identified this admission.     Consults:   Consults (From admission, onward)          Status Ordering Provider     Inpatient consult to Cardiology-[facility]  Once        Provider:  (Not yet assigned)    Completed DOMENICA DONIS     Inpatient consult to Infectious Diseases  Once        Provider:  (Not yet assigned)    Completed DOMENICA DONIS     Inpatient consult to Podiatry  Once        Provider:  (Not yet assigned)    Completed DOMENICA DONIS            Final Active Diagnoses:    Diagnosis Date Noted POA    PRINCIPAL PROBLEM:  Gangrene of toe of left foot [I96] 11/26/2024 Yes    Critical limb ischemia of left lower extremity [I70.222] 11/26/2024 Yes    Stage 4 chronic kidney disease [N18.4] 02/23/2024 Yes    Chronic combined systolic and diastolic congestive heart failure [I50.42] 02/23/2024 Yes    Insulin pump status [Z96.41] 10/13/2023 Not Applicable    Hx of CABG [Z95.1] 03/31/2021 Not Applicable    Class 3 obesity [E66.813] 03/15/2021 Yes    Other cirrhosis of liver [K74.69]  Yes    Hyperlipidemia associated with type 2 diabetes mellitus [E11.69, E78.5] 05/06/2019 Yes     Chronic    Paroxysmal atrial fibrillation [I48.0] 03/16/2019 Yes    Type 2 diabetes mellitus with diabetic neuropathy, with long-term current use of insulin [E11.40, Z79.4] 02/04/2019 Not Applicable     Chronic    Status post bariatric surgery [Z98.84] 01/11/2016 Not Applicable    Sleep apnea [G47.30] 08/26/2015 Yes    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    Specialties: Cardiology, Cardiovascular Disease  Why:  spoke with . They are working to get you a sooner follow-up appointment.  Contact information:  [address]  [address]  [place] [address]  [phone]               Reza Boyer MD Follow up on 12/12/2024.    Specialty: Internal Medicine  Why: at 10:15 am--You will be Leon Boyer. Dr. Reza Boyer did not have any early availability. Primary Care Physician  Contact information:  [address]  [address]  [facility] [address]  [phone]               Savanah Felton DPM Follow up.    Specialties: Podiatry, Wound Care  Why: Podiatry Follow-Up  Contact information:  [address]  [address]  [facility] [id number] [phone]                           Patient Instructions:      Ambulatory referral/consult to Podiatry   Standing Status: Future   Referral Priority: Routine Referral Type: Consultation   Referral Reason: Specialty Services Required   Requested Specialty: Podiatry   Number of Visits Requested: 1       Significant Diagnostic Studies: Labs: BMP:   Recent Labs   Lab 11/27/24  1347   *      K 4.1      CO2 24   BUN 46*   CREATININE 3.4*   CALCIUM 8.0*    and CBC No results for input(s): "WBC", "HGB", "HCT", "PLT" in the last 48 hours.    Pending Diagnostic Studies:       None           Medications:  Reconciled Home Medications:      Medication List        START taking these medications      clopidogreL 75 mg tablet  Commonly known as: PLAVIX  Take 1 tablet (75 mg total) by mouth once daily.  Start taking on: November 29, 2024     doxycycline 100 MG tablet  Commonly known as: VIBRA-TABS  Take 1 tablet (100 mg total) by mouth every 12 (twelve) hours. for 7 days     isosorbide mononitrate 30 MG 24 hr tablet  Commonly known as: IMDUR  Take 1 tablet (30 mg total) by mouth once daily.  Start taking on: November 29, " "2024            CONTINUE taking these medications      acetaminophen 325 MG tablet  Commonly known as: TYLENOL  Take 2 tablets (650 mg total) by mouth every 8 (eight) hours as needed for Pain.     aspirin 81 MG EC tablet  Commonly known as: ECOTRIN  Take 1 tablet (81 mg total) by mouth once daily.     BAQSIMI 3 mg/actuation Spry  Generic drug: glucagon  1 each by Nasal route daily as needed (if glucose is less than 70 - can repeat in 1 hour if still low/less than 70).     blood pressure monitor Kit  1 kit by Misc.(Non-Drug; Combo Route) route 2 (two) times a day.     blood sugar diagnostic Strp  Commonly known as: ONETOUCH VERIO TEST STRIPS  1 each by Misc.(Non-Drug; Combo Route) route 2 (two) times a day.     DEXCOM G6 SENSOR Sandi  Generic drug: blood-glucose sensor  Inject 1 each into the skin every 10 days.     DEXCOM G6 TRANSMITTER Sandi  Generic drug: blood-glucose transmitter  Inject 1 each into the skin every 10 days.     HumaLOG KwikPen Insulin 100 unit/mL pen  Generic drug: insulin lispro  Inject 5 Units into the skin before meals as needed (before each meal - if OFF of insulin pump). This is emergency back up insulin to use with meals if OFF of insulin pump     insulin detemir U-100 (Levemir) 100 unit/mL (3 mL) Inpn pen  Inject 30 Units into the skin every evening. This is emergency back up insulin only if OFF of your insulin pump     lancets 33 gauge Misc  Commonly known as: ONETOUCH DELICA PLUS LANCET  1 lancet  by Misc.(Non-Drug; Combo Route) route 2 (two) times daily.     LYUMJEV U-100 INSULIN 100 unit/mL  Generic drug: insulin lispro-aabc  Inject 80 Units into the skin continuous. Uses up to 80 units daily with omnipod 5 insulin pump as directed.     OMNIPOD 5 G6 PODS (GEN 5) Crtg  Generic drug: insulin pump cart,automated,BT  INJECT 1 EACH INTO THE SKIN EVERY OTHER DAY.     pen needle, diabetic 32 gauge x 5/32" Ndle  Use with toujeo insulin one daily only as Emergency use/back up insulin - if OFF OF " your insulin pump.     rosuvastatin 20 MG tablet  Commonly known as: CRESTOR  Take 20 mg by mouth once daily.     torsemide 20 MG Tab  Commonly known as: DEMADEX  Take 1 tablet (20 mg total) by mouth once daily.            STOP taking these medications      amLODIPine 10 MG tablet  Commonly known as: NORVASC     hydrALAZINE 25 MG tablet  Commonly known as: APRESOLINE              Indwelling Lines/Drains at time of discharge:   Lines/Drains/Airways       Drain  Duration                  Ureteral Drain/Stent 06/16/21 0912 Right ureter 24 Fr. 1261 days                    Time spent on the discharge of patient: 38 minutes         Sharifa Duque MD  Department of Hospital Medicine  [facility]

## 2024-11-28 NOTE — PROGRESS NOTES
[facility]  Podiatry  Progress Note    Patient Name: Jian Arrieta  MRN: [id number]  Admission Date: 11/26/2024  Hospital Length of Stay: 2 days  Attending Physician: Sharifa Duque MD  Primary Care Provider: Reza Boyer MD     Subjective:     Interval History: Patient resting in bed upon our arrival. He complains of no pain overnight and is curious of the plan going forward. Denies N/V/F/C, patient is currently afebrile and hemodynamically stable.     Follow-up For: Procedure(s) (LRB):  AORTOGRAM, WITH SERIALOGRAPHY (N/A)    Post-Operative Day: 2 Days Post-Op    Scheduled Meds:   aspirin  81 mg Oral Daily    atorvastatin  80 mg Oral QHS    clopidogreL  75 mg Oral Daily    doxycycline  100 mg Oral Q12H    enoxparin  30 mg Subcutaneous Daily    furosemide  80 mg Oral Daily    isosorbide mononitrate  30 mg Oral Daily     Continuous Infusions:   heparin (porcine)    Continuous  mL/hr at 11/26/24 1450 1,500 Units/hr at 11/26/24 1450    insulin aspart U-100  0-1.2 Units/hr Subcutaneous Continuous 0.01 mL/hr at 11/27/24 1207 1.2 Units/hr at 11/27/24 1207     PRN Meds:  Current Facility-Administered Medications:     dextrose 10%, 12.5 g, Intravenous, PRN    dextrose 10%, 25 g, Intravenous, PRN    glucagon (human recombinant), 1 mg, Intramuscular, PRN    glucagon (human recombinant), 1 mg, Intramuscular, PRN    glucagon (human recombinant), 1 mg, Intramuscular, PRN    glucose, 16 g, Oral, PRN    glucose, 16 g, Oral, PRN    glucose, 16 g, Oral, PRN    glucose, 24 g, Oral, PRN    glucose, 24 g, Oral, PRN    glucose, 24 g, Oral, PRN    heparin (porcine), , , Continuous PRN    insulin aspart U-100, 6-8 Units, Subcutaneous, PRN    melatonin, 6 mg, Oral, Nightly PRN    naloxone, 0.02 mg, Intravenous, PRN    ondansetron, 4 mg, Oral, Q6H PRN    sodium chloride 0.9%, 10 mL, Intravenous, Q12H PRN    Review of Systems   Constitutional:  Negative for appetite change, chills, diaphoresis and fever.   Respiratory:   Negative for cough, shortness of breath and wheezing.    Cardiovascular:  Positive for leg swelling. Negative for chest pain.   Gastrointestinal:  Negative for abdominal pain, constipation, diarrhea, nausea and vomiting.   Skin:  Positive for color change and wound.     Objective:     Vital Signs (Most Recent):  Temp: 98 °F (36.7 °C) (11/28/24 1136)  Pulse: 82 (11/28/24 1136)  Resp: 18 (11/28/24 1136)  BP: 114/63 (11/28/24 1136)  SpO2: 97 % (11/28/24 1136) Vital Signs (24h Range):  Temp:  [97.8 °F (36.6 °C)-99 °F (37.2 °C)] 98 °F (36.7 °C)  Pulse:  [71-85] 82  Resp:  [18-20] 18  SpO2:  [95 %-98 %] 97 %  BP: (101-127)/(55-63) 114/63     Weight: 116.8 kg (257 lb 8 oz)  Body mass index is 40.33 kg/m².    Foot Exam    Right Foot/Ankle     Inspection and Palpation  Ecchymosis: none  Tenderness: none   Swelling: dorsum   Skin Exam: dry skin, cellulitis and erythema; no drainage, no skin changes and no ulcer     Neurovascular  Saphenous nerve sensation: diminished  Tibial nerve sensation: diminished  Superficial peroneal nerve sensation: diminished  Deep peroneal nerve sensation: diminished  Sural nerve sensation: diminished    Comments  Non-palpable pulses, previously observable upon doppler. Redness and swelling noted to leg. No open wounds noted.     Left Foot/Ankle      Inspection and Palpation  Ecchymosis: second toe  Tenderness: none   Swelling: dorsum and lesser metatarsophalangeal joints   Skin Exam: cellulitis;     Neurovascular  Saphenous nerve sensation: diminished  Tibial nerve sensation: diminished  Superficial peroneal nerve sensation: diminished  Deep peroneal nerve sensation: diminished  Sural nerve sensation: diminished    Comments  Non-palpable pulses, previously observable upon doppler. Gangrene to 2nd digit distal tuft with surounding erythema. No maceration or drainage noted to 2nd digit however, there was some maceration noted in the 1st and 2nd interspaces. Redness and swelling to leg. No other open  wounds noted. No tenderness to palpation, crepitus, fluctuance, or malodor noted.           Laboratory:  A1C:   Recent Labs   Lab 11/15/24  0956   HGBA1C 7.9*     BMP:   Recent Labs   Lab 11/27/24  1347   *      K 4.1      CO2 24   BUN 46*   CREATININE 3.4*   CALCIUM 8.0*     CBC:   Recent Labs   Lab 11/26/24  1001   WBC 11.23   RBC 3.93*   HGB 11.6*   HCT 35.6*      MCV 91   MCH 29.5   MCHC 32.6     CMP:   Recent Labs   Lab 11/26/24  1001 11/27/24  1347   * 153*   CALCIUM 8.4* 8.0*   ALBUMIN 2.1*  --    PROT 7.2  --     137   K 4.3 4.1   CO2 28 24    103   BUN 46* 46*   CREATININE 3.4* 3.4*   ALKPHOS 132  --    ALT 13  --    AST 26  --    BILITOT 0.5  --      CRP:   Recent Labs   Lab 11/26/24  1001   CRP 88.1*     LFTs:   Recent Labs   Lab 11/26/24  1001   ALT 13   AST 26   ALKPHOS 132   BILITOT 0.5   PROT 7.2   ALBUMIN 2.1*     Microbiology Results (last 7 days)       Procedure Component Value Units Date/Time    Blood culture #2 **CANNOT BE ORDERED STAT** [[id number]] Collected: 11/26/24 1001    Order Status: Completed Specimen: Blood from Peripheral, Antecubital, Right Updated: 11/27/24 1812     Blood Culture, Routine No Growth to date      No Growth to date    Blood culture #1 **CANNOT BE ORDERED STAT** [[id number]]     Order Status: Sent Specimen: Blood           All pertinent labs reviewed within the last 24 hours.    Diagnostic Results:  I have reviewed all pertinent imaging results/findings within the past 24 hours.    MRI Foot Toes WO Contrast LT  Narrative: EXAMINATION:  MRI FOOT TOES WO CONTRAST LT    CLINICAL HISTORY:  Foot swelling, diabetic, osteomyelitis suspected, xray done;  Gangrene, not elsewhere classified    TECHNIQUE:  Multisequence, multiplanar magnetic resonance imaging of the left forefoot and toes was performed without intravenous contrast.    COMPARISON:  Radiographs from 11/26/2024.    FINDINGS:  There is marrow edema and T1 hypointense signal  of the 2nd toe middle and distal phalanges, compatible with acute osteomyelitis.   No additional areas of acute osteomyelitis are seen.  There is scattered subchondral cystic change throughout the midfoot articulations, compatible with degenerative changes or early Charcot arthropathy.  Remaining marrow signal is unremarkable.  There is a small 2nd toe metatarsophalangeal joint effusion.  There is diffuse soft tissue edema of the forefoot.  There is no evidence of a focal fluid collection on this noncontrast study.  There is diffusely increased T2 signal of the visualized musculature, with associated fatty atrophy, compatible with neuropathic changes.  Flexor and extensor tendons are grossly intact.  Impression: 1. Acute osteomyelitis of the 2nd middle and distal phalanges.  2. Diffuse soft tissue edema of the foot, compatible with cellulitis.  No focal fluid collection.  3. Scattered subchondral cystic changes in the midfoot, compatible with degenerative changes or early Charcot arthropathy.    Electronically signed by: Po Judd  Date:    11/27/2024  Time:    19:10  US Lower Extremity Arteries Right  Narrative: EXAMINATION:  US LOWER EXTREMITY ARTERIES RIGHT    CLINICAL HISTORY:  eval for PAD;    TECHNIQUE:  Right lower extremity arterial duplex ultrasound examination performed. Multiple gray scale and color doppler images were obtained in addition to waveform analysis.    COMPARISON:  None.    FINDINGS:  The peak systolic velocities on the right are as follows, in centimeters/second:    Common femoral artery: 250, triphasic    Femoral artery, proximal: 107, triphasic    Femoral artery, mid portion: 94, triphasic    Femoral artery, distal: 51, triphasic    Popliteal artery: Thick proximal 42, triphasic; distal 66, triphasic    Anterior tibial artery: Proximal 74, mid 74, distal 141.  Diffuse monophasic waveforms.    Posterior tibial artery: Proximal 53, mid 24, distal 99.  Diffuse monophasic waveforms.    1.7  cm hypoechoic ovoid lesion within the subcutaneous soft tissues at the medial aspect of the right thigh, likely a lymph node.  Impression: 1. Peripheral arterial disease with focal areas of high-grade stenosis at the anterior and posterior tibial arteries.    Electronically signed by: Donovan Denny MD  Date:    11/27/2024  Time:    10:08      Assessment/Plan:     Orthopedic  * Gangrene of toe of left foot  Assesment  70 yo male with erythema and swelling to b/l legs consistant with SSTI cellulitus. Dry gangrene to L 2nd digit. No maceration, drainage, crepitus, or fluctuance noted. Hendricks grade 4. XR L foot is unremarkable. MRI shows acute osteomyelitis of the L 2nd middle and distal phalanges. US LE veins shows no DVT. US LE arterial shows lack of flow involving the mid and distal aspects of the L anterior and posterior tibial arteries. Remaining interrogated vessels appear patent, with waveform morphology suggesting PAD and or proximal steno-occlusive disease.    Plan  -Physical exam and imaging findings discussed with the patient. Disscused with patient that b/l legs consistent with cellulitus and dry stable gangrene to L 2nd digit. Cardiology states that the angiosome supplying the 2nd left toe is well-perfused via the anterior tibial/ dorsalis pedis artery and this was relayed to the patient however, after long discussion patient has decided to forgo any surgical intervention or biopsy this admission.   -No surgical intervention at this time. Recommend continuing with abx, wound care, and to follow up with Dr. Felton outpatient.   -Abx per Primary  -Advised patient to keep b/l LE elevated and compression for LE swelling  -L 2nd digit painted with betadine and left open to air dry  -Wound care orders placed  -WBAT  -Podiatry will sign off. Please reach out with any questions.         Leonidas Brandon MD  Podiatry  [place] - Telemetry

## 2024-11-28 NOTE — PROGRESS NOTES
[facility]                    Pharmacy       Discharge Medication Education    Patient ACCEPTED medication education. Pharmacy has provided education on the name, indication, and possible side effects of the medication(s) prescribed, using teach-back method.     The following medications have also been discussed, during this admission.        Medication List        START taking these medications      clopidogreL 75 mg tablet  Commonly known as: PLAVIX  Take 1 tablet (75 mg total) by mouth once daily.  Start taking on: November 29, 2024     doxycycline 100 MG tablet  Commonly known as: VIBRA-TABS  Take 1 tablet (100 mg total) by mouth every 12 (twelve) hours. for 7 days     isosorbide mononitrate 30 MG 24 hr tablet  Commonly known as: IMDUR  Take 1 tablet (30 mg total) by mouth once daily.  Start taking on: November 29, 2024            CONTINUE taking these medications      acetaminophen 325 MG tablet  Commonly known as: TYLENOL  Take 2 tablets (650 mg total) by mouth every 8 (eight) hours as needed for Pain.     aspirin 81 MG EC tablet  Commonly known as: ECOTRIN  Take 1 tablet (81 mg total) by mouth once daily.     BAQSIMI 3 mg/actuation Spry  Generic drug: glucagon  1 each by Nasal route daily as needed (if glucose is less than 70 - can repeat in 1 hour if still low/less than 70).     blood pressure monitor Kit  1 kit by Misc.(Non-Drug; Combo Route) route 2 (two) times a day.     blood sugar diagnostic Strp  Commonly known as: ONETOUCH VERIO TEST STRIPS  1 each by Misc.(Non-Drug; Combo Route) route 2 (two) times a day.     DEXCOM G6 SENSOR Sandi  Generic drug: blood-glucose sensor  Inject 1 each into the skin every 10 days.     DEXCOM G6 TRANSMITTER Sandi  Generic drug: blood-glucose transmitter  Inject 1 each into the skin every 10 days.     HumaLOG KwikPen Insulin 100 unit/mL pen  Generic drug: insulin lispro  Inject 5 Units into the skin before meals as needed (before each meal - if OFF of  "insulin pump). This is emergency back up insulin to use with meals if OFF of insulin pump     insulin detemir U-100 (Levemir) 100 unit/mL (3 mL) Inpn pen  Inject 30 Units into the skin every evening. This is emergency back up insulin only if OFF of your insulin pump     lancets 33 gauge Misc  Commonly known as: ONETOUCH DELICA PLUS LANCET  1 lancet  by Misc.(Non-Drug; Combo Route) route 2 (two) times daily.     LYUMJEV U-100 INSULIN 100 unit/mL  Generic drug: insulin lispro-aabc  Inject 80 Units into the skin continuous. Uses up to 80 units daily with omnipod 5 insulin pump as directed.     OMNIPOD 5 G6 PODS (GEN 5) Crtg  Generic drug: insulin pump cart,automated,BT  INJECT 1 EACH INTO THE SKIN EVERY OTHER DAY.     pen needle, diabetic 32 gauge x 5/32" Ndle  Use with toujeo insulin one daily only as Emergency use/back up insulin - if OFF OF your insulin pump.     rosuvastatin 20 MG tablet  Commonly known as: CRESTOR     torsemide 20 MG Tab  Commonly known as: DEMADEX  Take 1 tablet (20 mg total) by mouth once daily.            STOP taking these medications      amLODIPine 10 MG tablet  Commonly known as: NORVASC     hydrALAZINE 25 MG tablet  Commonly known as: APRESOLINE               Where to Get Your Medications        These medications were sent to [facility]/pharmacy #[id number] - [place] - [address]  [address] [address]      Phone: [phone]   aspirin 81 MG EC tablet  clopidogreL 75 mg tablet  doxycycline 100 MG tablet  isosorbide mononitrate 30 MG 24 hr tablet  torsemide 20 MG Tab          Thank you  Radha Shaikh, PharmD  [phone]    "

## 2024-11-28 NOTE — NURSING
IV Insertion Nurse Note     Peripheral IV access requested by charge RN.  @ 1450: Upon presenting to room with bedside Binta MARIO, patient requesting not to have IV access started as his antibiotics are ordered PO now.  Plan to address with MD.    @ 1530: Message received from RN that patient now agreeable to IV insertion.    @ 1810: Patient currently in MRI.  Will attempt to obtain IV access at a later time.    Reviewed plan of care with Bedside Binta MARIO .   Please call Rapid Response RN, Dolly Moya RN with any questions or concerns at [phone].

## 2024-11-28 NOTE — PLAN OF CARE
Problem: Adult Inpatient Plan of Care  Goal: Plan of Care Review  Outcome: Progressing  Flowsheets (Taken 11/27/2024 2001)  Plan of Care Reviewed With: patient     Problem: Diabetes Comorbidity  Goal: Blood Glucose Level Within Targeted Range  Outcome: Progressing  Intervention: Monitor and Manage Glycemia  Flowsheets (Taken 11/27/2024 2001)  Glycemic Management: blood glucose monitored     Problem: Wound  Goal: Skin Health and Integrity  Outcome: Progressing  Intervention: Optimize Skin Protection  Flowsheets (Taken 11/27/2024 2001)  Pressure Reduction Techniques: frequent weight shift encouraged  Activity Management: Rolling - L1

## 2024-11-28 NOTE — SUBJECTIVE & OBJECTIVE
Subjective:     Interval History: Patient resting in bed upon our arrival. He complains of no pain overnight and is curious of the plan going forward. Denies N/V/F/C, patient is currently afebrile and hemodynamically stable.     Follow-up For: Procedure(s) (LRB):  AORTOGRAM, WITH SERIALOGRAPHY (N/A)    Post-Operative Day: 2 Days Post-Op    Scheduled Meds:   aspirin  81 mg Oral Daily    atorvastatin  80 mg Oral QHS    clopidogreL  75 mg Oral Daily    doxycycline  100 mg Oral Q12H    enoxparin  30 mg Subcutaneous Daily    furosemide  80 mg Oral Daily    isosorbide mononitrate  30 mg Oral Daily     Continuous Infusions:   heparin (porcine)    Continuous  mL/hr at 11/26/24 1450 1,500 Units/hr at 11/26/24 1450    insulin aspart U-100  0-1.2 Units/hr Subcutaneous Continuous 0.01 mL/hr at 11/27/24 1207 1.2 Units/hr at 11/27/24 1207     PRN Meds:  Current Facility-Administered Medications:     dextrose 10%, 12.5 g, Intravenous, PRN    dextrose 10%, 25 g, Intravenous, PRN    glucagon (human recombinant), 1 mg, Intramuscular, PRN    glucagon (human recombinant), 1 mg, Intramuscular, PRN    glucagon (human recombinant), 1 mg, Intramuscular, PRN    glucose, 16 g, Oral, PRN    glucose, 16 g, Oral, PRN    glucose, 16 g, Oral, PRN    glucose, 24 g, Oral, PRN    glucose, 24 g, Oral, PRN    glucose, 24 g, Oral, PRN    heparin (porcine), , , Continuous PRN    insulin aspart U-100, 6-8 Units, Subcutaneous, PRN    melatonin, 6 mg, Oral, Nightly PRN    naloxone, 0.02 mg, Intravenous, PRN    ondansetron, 4 mg, Oral, Q6H PRN    sodium chloride 0.9%, 10 mL, Intravenous, Q12H PRN    Review of Systems   Constitutional:  Negative for appetite change, chills, diaphoresis and fever.   Respiratory:  Negative for cough, shortness of breath and wheezing.    Cardiovascular:  Positive for leg swelling. Negative for chest pain.   Gastrointestinal:  Negative for abdominal pain, constipation, diarrhea, nausea and vomiting.   Skin:  Positive  for color change and wound.     Objective:     Vital Signs (Most Recent):  Temp: 98 °F (36.7 °C) (11/28/24 1136)  Pulse: 82 (11/28/24 1136)  Resp: 18 (11/28/24 1136)  BP: 114/63 (11/28/24 1136)  SpO2: 97 % (11/28/24 1136) Vital Signs (24h Range):  Temp:  [97.8 °F (36.6 °C)-99 °F (37.2 °C)] 98 °F (36.7 °C)  Pulse:  [71-85] 82  Resp:  [18-20] 18  SpO2:  [95 %-98 %] 97 %  BP: (101-127)/(55-63) 114/63     Weight: 116.8 kg (257 lb 8 oz)  Body mass index is 40.33 kg/m².    Foot Exam    Right Foot/Ankle     Inspection and Palpation  Ecchymosis: none  Tenderness: none   Swelling: dorsum   Skin Exam: dry skin, cellulitis and erythema; no drainage, no skin changes and no ulcer     Neurovascular  Saphenous nerve sensation: diminished  Tibial nerve sensation: diminished  Superficial peroneal nerve sensation: diminished  Deep peroneal nerve sensation: diminished  Sural nerve sensation: diminished    Comments  Non-palpable pulses, previously observable upon doppler. Redness and swelling noted to leg. No open wounds noted.     Left Foot/Ankle      Inspection and Palpation  Ecchymosis: second toe  Tenderness: none   Swelling: dorsum and lesser metatarsophalangeal joints   Skin Exam: cellulitis;     Neurovascular  Saphenous nerve sensation: diminished  Tibial nerve sensation: diminished  Superficial peroneal nerve sensation: diminished  Deep peroneal nerve sensation: diminished  Sural nerve sensation: diminished    Comments  Non-palpable pulses, previously observable upon doppler. Gangrene to 2nd digit distal tuft with surounding erythema. No maceration or drainage noted to 2nd digit however, there was some maceration noted in the 1st and 2nd interspaces. Redness and swelling to leg. No other open wounds noted. No tenderness to palpation, crepitus, fluctuance, or malodor noted.           Laboratory:  A1C:   Recent Labs   Lab 11/15/24  0956   HGBA1C 7.9*     BMP:   Recent Labs   Lab 11/27/24  1347   *      K 4.1       CO2 24   BUN 46*   CREATININE 3.4*   CALCIUM 8.0*     CBC:   Recent Labs   Lab 11/26/24  1001   WBC 11.23   RBC 3.93*   HGB 11.6*   HCT 35.6*      MCV 91   MCH 29.5   MCHC 32.6     CMP:   Recent Labs   Lab 11/26/24  1001 11/27/24  1347   * 153*   CALCIUM 8.4* 8.0*   ALBUMIN 2.1*  --    PROT 7.2  --     137   K 4.3 4.1   CO2 28 24    103   BUN 46* 46*   CREATININE 3.4* 3.4*   ALKPHOS 132  --    ALT 13  --    AST 26  --    BILITOT 0.5  --      CRP:   Recent Labs   Lab 11/26/24  1001   CRP 88.1*     LFTs:   Recent Labs   Lab 11/26/24  1001   ALT 13   AST 26   ALKPHOS 132   BILITOT 0.5   PROT 7.2   ALBUMIN 2.1*     Microbiology Results (last 7 days)       Procedure Component Value Units Date/Time    Blood culture #2 **CANNOT BE ORDERED STAT** [[id number]] Collected: 11/26/24 1001    Order Status: Completed Specimen: Blood from Peripheral, Antecubital, Right Updated: 11/27/24 1812     Blood Culture, Routine No Growth to date      No Growth to date    Blood culture #1 **CANNOT BE ORDERED STAT** [[id number]]     Order Status: Sent Specimen: Blood           All pertinent labs reviewed within the last 24 hours.    Diagnostic Results:  I have reviewed all pertinent imaging results/findings within the past 24 hours.    MRI Foot Toes WO Contrast LT  Narrative: EXAMINATION:  MRI FOOT TOES WO CONTRAST LT    CLINICAL HISTORY:  Foot swelling, diabetic, osteomyelitis suspected, xray done;  Gangrene, not elsewhere classified    TECHNIQUE:  Multisequence, multiplanar magnetic resonance imaging of the left forefoot and toes was performed without intravenous contrast.    COMPARISON:  Radiographs from 11/26/2024.    FINDINGS:  There is marrow edema and T1 hypointense signal of the 2nd toe middle and distal phalanges, compatible with acute osteomyelitis.   No additional areas of acute osteomyelitis are seen.  There is scattered subchondral cystic change throughout the midfoot articulations, compatible with  degenerative changes or early Charcot arthropathy.  Remaining marrow signal is unremarkable.  There is a small 2nd toe metatarsophalangeal joint effusion.  There is diffuse soft tissue edema of the forefoot.  There is no evidence of a focal fluid collection on this noncontrast study.  There is diffusely increased T2 signal of the visualized musculature, with associated fatty atrophy, compatible with neuropathic changes.  Flexor and extensor tendons are grossly intact.  Impression: 1. Acute osteomyelitis of the 2nd middle and distal phalanges.  2. Diffuse soft tissue edema of the foot, compatible with cellulitis.  No focal fluid collection.  3. Scattered subchondral cystic changes in the midfoot, compatible with degenerative changes or early Charcot arthropathy.    Electronically signed by: Po Judd  Date:    11/27/2024  Time:    19:10  US Lower Extremity Arteries Right  Narrative: EXAMINATION:  US LOWER EXTREMITY ARTERIES RIGHT    CLINICAL HISTORY:  eval for PAD;    TECHNIQUE:  Right lower extremity arterial duplex ultrasound examination performed. Multiple gray scale and color doppler images were obtained in addition to waveform analysis.    COMPARISON:  None.    FINDINGS:  The peak systolic velocities on the right are as follows, in centimeters/second:    Common femoral artery: 250, triphasic    Femoral artery, proximal: 107, triphasic    Femoral artery, mid portion: 94, triphasic    Femoral artery, distal: 51, triphasic    Popliteal artery: Thick proximal 42, triphasic; distal 66, triphasic    Anterior tibial artery: Proximal 74, mid 74, distal 141.  Diffuse monophasic waveforms.    Posterior tibial artery: Proximal 53, mid 24, distal 99.  Diffuse monophasic waveforms.    1.7 cm hypoechoic ovoid lesion within the subcutaneous soft tissues at the medial aspect of the right thigh, likely a lymph node.  Impression: 1. Peripheral arterial disease with focal areas of high-grade stenosis at the anterior and  posterior tibial arteries.    Electronically signed by: Donovan Denny MD  Date:    11/27/2024  Time:    10:08

## 2024-11-28 NOTE — ASSESSMENT & PLAN NOTE
Assesment  68 yo male with erythema and swelling to b/l legs consistant with SSTI cellulitus. Dry gangrene to L 2nd digit. No maceration, drainage, crepitus, or fluctuance noted. Hendricks grade 4. XR L foot is unremarkable. MRI shows acute osteomyelitis of the L 2nd middle and distal phalanges. US LE veins shows no DVT. US LE arterial shows lack of flow involving the mid and distal aspects of the L anterior and posterior tibial arteries. Remaining interrogated vessels appear patent, with waveform morphology suggesting PAD and or proximal steno-occlusive disease.    Plan  -Physical exam and imaging findings discussed with the patient. Disscused with patient that b/l legs consistent with cellulitus and dry stable gangrene to L 2nd digit. Cardiology states that the angiosome supplying the 2nd left toe is well-perfused via the anterior tibial/ dorsalis pedis artery and this was relayed to the patient however, after long discussion patient has decided to forgo any surgical intervention or biopsy this admission.   -No surgical intervention at this time. Recommend continuing with abx, wound care, and to follow up with Dr. Felton outpatient.   -Abx per Primary  -Advised patient to keep b/l LE elevated and compression for LE swelling  -L 2nd digit painted with betadine and left open to air dry  -Wound care orders placed  -WBAT  -Podiatry will sign off. Please reach out with any questions.

## 2024-11-28 NOTE — PROGRESS NOTES
[facility]                    Pharmacy       Discharge Medication Education    Patient ACCEPTED medication education. Pharmacy has provided education on the name, indication, and possible side effects of the medication(s) prescribed, using teach-back method.     The following medications have also been discussed, during this admission.        Medication List        START taking these medications      clopidogreL 75 mg tablet  Commonly known as: PLAVIX  Take 1 tablet (75 mg total) by mouth once daily.  Start taking on: November 29, 2024     doxycycline 100 MG tablet  Commonly known as: VIBRA-TABS  Take 1 tablet (100 mg total) by mouth every 12 (twelve) hours. for 7 days     isosorbide mononitrate 30 MG 24 hr tablet  Commonly known as: IMDUR  Take 1 tablet (30 mg total) by mouth once daily.  Start taking on: November 29, 2024            CONTINUE taking these medications      acetaminophen 325 MG tablet  Commonly known as: TYLENOL  Take 2 tablets (650 mg total) by mouth every 8 (eight) hours as needed for Pain.     aspirin 81 MG EC tablet  Commonly known as: ECOTRIN  Take 1 tablet (81 mg total) by mouth once daily.     BAQSIMI 3 mg/actuation Spry  Generic drug: glucagon  1 each by Nasal route daily as needed (if glucose is less than 70 - can repeat in 1 hour if still low/less than 70).     blood pressure monitor Kit  1 kit by Misc.(Non-Drug; Combo Route) route 2 (two) times a day.     blood sugar diagnostic Strp  Commonly known as: ONETOUCH VERIO TEST STRIPS  1 each by Misc.(Non-Drug; Combo Route) route 2 (two) times a day.     DEXCOM G6 SENSOR Sandi  Generic drug: blood-glucose sensor  Inject 1 each into the skin every 10 days.     DEXCOM G6 TRANSMITTER Sandi  Generic drug: blood-glucose transmitter  Inject 1 each into the skin every 10 days.     HumaLOG KwikPen Insulin 100 unit/mL pen  Generic drug: insulin lispro  Inject 5 Units into the skin before meals as needed (before each meal - if OFF of  "insulin pump). This is emergency back up insulin to use with meals if OFF of insulin pump     insulin detemir U-100 (Levemir) 100 unit/mL (3 mL) Inpn pen  Inject 30 Units into the skin every evening. This is emergency back up insulin only if OFF of your insulin pump     lancets 33 gauge Misc  Commonly known as: ONETOUCH DELICA PLUS LANCET  1 lancet  by Misc.(Non-Drug; Combo Route) route 2 (two) times daily.     LYUMJEV U-100 INSULIN 100 unit/mL  Generic drug: insulin lispro-aabc  Inject 80 Units into the skin continuous. Uses up to 80 units daily with omnipod 5 insulin pump as directed.     OMNIPOD 5 G6 PODS (GEN 5) Crtg  Generic drug: insulin pump cart,automated,BT  INJECT 1 EACH INTO THE SKIN EVERY OTHER DAY.     pen needle, diabetic 32 gauge x 5/32" Ndle  Use with toujeo insulin one daily only as Emergency use/back up insulin - if OFF OF your insulin pump.     rosuvastatin 20 MG tablet  Commonly known as: CRESTOR     torsemide 20 MG Tab  Commonly known as: DEMADEX  Take 1 tablet (20 mg total) by mouth once daily.            STOP taking these medications      amLODIPine 10 MG tablet  Commonly known as: NORVASC     hydrALAZINE 25 MG tablet  Commonly known as: APRESOLINE               Where to Get Your Medications        These medications were sent to [facility]/pharmacy #[id number] - [place] - [address]  [address] [address]      Phone: [phone]   aspirin 81 MG EC tablet  clopidogreL 75 mg tablet  doxycycline 100 MG tablet  isosorbide mononitrate 30 MG 24 hr tablet  torsemide 20 MG Tab          Thank you  Radha Shaikh, PharmD  [phone]    "

## 2024-11-28 NOTE — PROGRESS NOTES
Discharge orders noted. Additional clinical references attached. Patient's discharge instructions given by bedside RN and reviewed via this VN.  Education provided on new and previous medications, diagnosis, and follow-up appointments. Patient verbalized understanding and teach back method was used. Patient's ride/transportation home at bedside. All questions answered. Transport to [facility] will be requested. Floor nurse notified.              11/28/24 1240   Shift   Virtual Nurse - Rounding Complete   Virtual Nurse - Patient Verbalized Approval Of Camera Use   Safety/Activity   Patient Rounds bed in low position;call light in patient/parent reach;clutter free environment maintained;visualized patient   Safety Promotion/Fall Prevention side rails raised x 2   Positioning   Body Position supine   Head of Bed (HOB) Positioning HOB elevated

## 2024-12-01 LAB — BACTERIA BLD CULT: NORMAL

## 2024-12-03 ENCOUNTER — HOSPITAL ENCOUNTER (OUTPATIENT)
Dept: WOUND CARE | Facility: HOSPITAL | Age: 69
Discharge: HOME | End: 2024-12-03
Attending: STUDENT IN AN ORGANIZED HEALTH CARE EDUCATION/TRAINING PROGRAM
Payer: MEDICARE

## 2024-12-03 VITALS
BODY MASS INDEX: 40.42 KG/M2 | TEMPERATURE: 98 F | HEIGHT: 67 IN | WEIGHT: 257.5 LBS | DIASTOLIC BLOOD PRESSURE: 57 MMHG | SYSTOLIC BLOOD PRESSURE: 109 MMHG | HEART RATE: 80 BPM

## 2024-12-03 DIAGNOSIS — I96 GANGRENE OF TOE OF LEFT FOOT: ICD-10-CM

## 2024-12-03 DIAGNOSIS — I96 GANGRENE: Primary | ICD-10-CM

## 2024-12-03 DIAGNOSIS — E11.21 DIABETIC NEPHROPATHY ASSOCIATED WITH TYPE 2 DIABETES MELLITUS: ICD-10-CM

## 2024-12-03 DIAGNOSIS — I70.222 CRITICAL LIMB ISCHEMIA OF LEFT LOWER EXTREMITY: ICD-10-CM

## 2024-12-03 PROCEDURE — 99214 OFFICE O/P EST MOD 30 MIN: CPT | Mod: ,,, | Performed by: STUDENT IN AN ORGANIZED HEALTH CARE EDUCATION/TRAINING PROGRAM

## 2024-12-03 PROCEDURE — 99214 OFFICE O/P EST MOD 30 MIN: CPT

## 2024-12-03 NOTE — PROGRESS NOTES
"                     Wound Care & Hyperbaric Medicine    Subjective:       Patient ID: Jian Arrieta is a 69 y.o. male.    Chief Complaint: Non-healing Wound    Readmit to wound care with dry gangrene left distal second toe.  Reports getting a pedicure on 11/21/24 states " my toe turned red the next day and on the 23rd it turned black and the skin came off on the 26th sp I went to ER."  Discharged on 11/28/24 with Doxycycline PO and reports leaving wound open to air with shoe cover on and slipper. BLE edematous, + dopplers BLE. Discussed MRI results and recommended  partial amputation second toe. Patient refused states " I don't want to do that right now. I want to try the antibiotics for another week or two first. I think it looks better to me right now so I want to try that first."  Plan of care imitated and discusse all questions answered.  Will reassess one week.     Review of Systems      Objective:     Vitals:    12/03/24 1249   BP: (!) 109/57   Pulse: 80   Temp: 97.8 °F (36.6 °C)         Physical Exam       Wound 12/03/24 1300 Other (comment) Left distal Toe, second (Active)   12/03/24 1300 Toe, second   Present on Original Admission: Y   Primary Wound Type: Other   Side: Left   Orientation: distal   Wound Approximate Age at First Assessment (Weeks):    Wound Number:    Is this injury device related?:    Incision Type:    Closure Method:    Wound Description (Comments):    Type:    Additional Comments:    Ankle-Brachial Index:    Pulses:    Removal Indication and Assessment:    Wound Outcome:    Wound Image     12/03/24 1501   Dressing Appearance Open to air;Dry 12/03/24 1501   Drainage Amount None 12/03/24 1501   Appearance Black;Dry 12/03/24 1501   Tissue loss description Full thickness 12/03/24 1501   Black (%), Wound Tissue Color 100 % 12/03/24 1501   Periwound Area Edematous;Redness 12/03/24 1501   Wound Edges Defined 12/03/24 1501   Wound Length (cm) 4 cm 12/03/24 1501   Wound Width (cm) 4 cm 12/03/24 " 1501   Wound Depth (cm) 0 cm 12/03/24 1501   Wound Volume (cm^3) 0 cm^3 12/03/24 1501   Wound Surface Area (cm^2) 16 cm^2 12/03/24 1501   Care Cleansed with:;Sterile normal saline 12/03/24 1501   Dressing Applied;Rolled gauze;Other (comment);Tubular bandage 12/03/24 1501   Periwound Care Dry periwound area maintained 12/03/24 1501   Compression Tubular elasticized bandage 12/03/24 1501   Off Loading Off loading shoe 12/03/24 1501   Dressing Change Due 12/04/24 12/03/24 1501            Wound 12/03/24 1300 Other (comment) Right Leg (Active)   12/03/24 1300 Leg   Present on Original Admission: Y   Primary Wound Type: Other   Side: Right   Orientation:    Wound Approximate Age at First Assessment (Weeks):    Wound Number:    Is this injury device related?:    Incision Type:    Closure Method:    Wound Description (Comments):    Type:    Additional Comments:    Ankle-Brachial Index:    Pulses:    Removal Indication and Assessment:    Wound Outcome:    Dressing Appearance Open to air;Dry 12/03/24 1501   Appearance Closed/resurfaced 12/03/24 1501   Periwound Area Edematous 12/03/24 1501   Wound Edges Rolled/closed 12/03/24 1501   Wound Length (cm) 0 cm 12/03/24 1501   Wound Width (cm) 0 cm 12/03/24 1501   Wound Depth (cm) 0 cm 12/03/24 1501   Wound Volume (cm^3) 0 cm^3 12/03/24 1501   Wound Surface Area (cm^2) 0 cm^2 12/03/24 1501   Care Cleansed with:;Soap and water 12/03/24 1501   Dressing Applied;Tubular bandage 12/03/24 1501   Periwound Care Moisturizer applied 12/03/24 1501   Compression Tubular elasticized bandage 12/03/24 1501   Dressing Change Due 12/04/24 12/03/24 1501         Assessment/Plan:         ICD-10-CM ICD-9-CM   1. Gangrene  I96 785.4   2. Gangrene of toe of left foot  I96 785.4   3. Critical limb ischemia of left lower extremity  I70.222 440.22   4. Diabetic nephropathy associated with type 2 diabetes mellitus  E11.21 250.40     583.81           Tissue pathology and/or culture taken:  [] Yes [x] No    Sharp debridement performed:   [] Yes [x] No   Labs ordered this visit:   [] Yes [x] No   Imaging ordered this visit:   [] Yes [x] No           Orders Placed This Encounter   Procedures    Change dressing     Left 2nd Toe    Cleanse wound with:Normal Saline   Lidocaine:PRN   Silver nitrate:PRN   Destiny Wound: Lotion BLE   Primary dressing:Betadine   Secondary dressing:Conform   Offloading:Elevate BLE as much as possible. Darco shoe  Edema control:Tubi  F toes to knee BLE  Frequency: Daily and PRN per Patient    Follow-up:1 week      Other Orders: Continue Doxycycline PO as ordered.        Follow up in about 1 week (around 12/10/2024) for .            This includes face to face time and non-face to face time preparing to see the patient (eg, review of tests), obtaining and/or reviewing separately obtained history, documenting clinical information in the electronic or other health record, independently interpreting results and communicating results to the patient/family/caregiver, or care coordinator.

## 2024-12-10 ENCOUNTER — HOSPITAL ENCOUNTER (OUTPATIENT)
Dept: WOUND CARE | Facility: HOSPITAL | Age: 69
Discharge: HOME | End: 2024-12-10
Attending: STUDENT IN AN ORGANIZED HEALTH CARE EDUCATION/TRAINING PROGRAM
Payer: MEDICARE

## 2024-12-10 VITALS — DIASTOLIC BLOOD PRESSURE: 75 MMHG | SYSTOLIC BLOOD PRESSURE: 130 MMHG | TEMPERATURE: 99 F | HEART RATE: 88 BPM

## 2024-12-10 DIAGNOSIS — I96 GANGRENE: Primary | ICD-10-CM

## 2024-12-10 PROCEDURE — 99213 OFFICE O/P EST LOW 20 MIN: CPT

## 2024-12-10 PROCEDURE — 99214 OFFICE O/P EST MOD 30 MIN: CPT | Mod: ,,, | Performed by: STUDENT IN AN ORGANIZED HEALTH CARE EDUCATION/TRAINING PROGRAM

## 2024-12-10 NOTE — PROGRESS NOTES
Wound Care & Hyperbaric Medicine    Subjective:       Patient ID: Jian Arrieta is a 69 y.o. male.    Chief Complaint: Wound Care    HPI  Patient presents to clinic with Left 2nd toe gangrene.  Dr. Felton suggested an amputation.  Patient refused at this time. Patient wants to wait for second opinion and vascular clearance.  Patient will schedule appointment/surgery post 2nd opinion.  Patient also instructed to keep foot completely dry to prevent infection.  Patient voiced understanding.  Review of Systems      Objective:     Vitals:    12/10/24 1416   BP: 130/75   Pulse: 88   Temp: 98.5 °F (36.9 °C)         Physical Exam       Wound 12/03/24 1300 Other (comment) Left distal Toe, second (Active)   12/03/24 1300 Toe, second   Present on Original Admission: Y   Primary Wound Type: Other   Side: Left   Orientation: distal   Wound Approximate Age at First Assessment (Weeks):    Wound Number:    Is this injury device related?:    Incision Type:    Closure Method:    Wound Description (Comments):    Type:    Additional Comments:    Ankle-Brachial Index:    Pulses:    Removal Indication and Assessment:    Wound Outcome:    Wound Image   12/10/24 1357   Dressing Appearance Dry;Open to air 12/10/24 1357   Drainage Amount None 12/10/24 1357   Appearance Black 12/10/24 1357   Tissue loss description Full thickness 12/10/24 1357   Black (%), Wound Tissue Color 100 % 12/10/24 1357   Periwound Area Intact 12/10/24 1357   Wound Length (cm) 4 cm 12/10/24 1357   Wound Width (cm) 4 cm 12/10/24 1357   Wound Depth (cm) 0 cm 12/10/24 1357   Wound Volume (cm^3) 0 cm^3 12/10/24 1357   Wound Surface Area (cm^2) 16 cm^2 12/10/24 1357   Care Cleansed with:;Sterile normal saline 12/10/24 1357   Dressing Tubular bandage 12/10/24 1357   Periwound Care Topical treatment applied 12/10/24 1357   Compression Tubular elasticized bandage 12/10/24 1357         Assessment/Plan:         ICD-10-CM ICD-9-CM   1. Gangrene  I96 785.4            Tissue pathology and/or culture taken:   [] Yes    [x] No   Sharp debridement performed:    [] Yes    [x] No   Labs ordered this visit:    [] Yes    [x] No   Imaging ordered this visit:    [] Yes    [x] No     Is the wound improving?    [] Yes    [x] No   Is wound self limiting?    [x] Yes    [] No   Is the wound limb threatening?   [x] Yes    [] No  How does the wound affect patients function? [] None [x] Limited [] Significant          Orders Placed This Encounter   Procedures    Change dressing     Left 2nd Toe    Cleanse wound with:Normal Saline   Lidocaine:PRN   Silver nitrate:PRN   Destiny Wound: Lotion BLE   Primary dressing:Betadine   Secondary dressing:Leave open to air  Offloading:Elevate BLE as much as possible. Darco shoe   Edema control:Tubi  F X2 toes to knee BLE   Frequency: Daily and PRN per Patient     Follow-up:Patient to call for follow up visit after appointment with Vascular    Other Orders: Patient to follow up with Vascular surgeon/Cardiologist for clearance for toe amputation.        Patient to follow up post vascular clearance            This includes face to face time and non-face to face time preparing to see the patient (eg, review of tests), obtaining and/or reviewing separately obtained history, documenting clinical information in the electronic or other health record, independently interpreting results and communicating results to the patient/family/caregiver, or care coordinator.  Total time spent  > 45 minutes

## 2024-12-19 ENCOUNTER — TELEPHONE (OUTPATIENT)
Dept: PODIATRY | Facility: CLINIC | Age: 69
End: 2024-12-19
Payer: MEDICARE

## 2024-12-19 ENCOUNTER — OFFICE VISIT (OUTPATIENT)
Dept: PODIATRY | Facility: CLINIC | Age: 69
End: 2024-12-19
Payer: MEDICARE

## 2024-12-19 VITALS
HEIGHT: 67 IN | WEIGHT: 257.5 LBS | HEART RATE: 79 BPM | DIASTOLIC BLOOD PRESSURE: 73 MMHG | BODY MASS INDEX: 40.42 KG/M2 | SYSTOLIC BLOOD PRESSURE: 123 MMHG

## 2024-12-19 DIAGNOSIS — I96 GANGRENE: Primary | ICD-10-CM

## 2024-12-19 PROCEDURE — 1111F DSCHRG MED/CURRENT MED MERGE: CPT | Mod: CPTII,S$GLB,, | Performed by: STUDENT IN AN ORGANIZED HEALTH CARE EDUCATION/TRAINING PROGRAM

## 2024-12-19 PROCEDURE — 99999 PR PBB SHADOW E&M-EST. PATIENT-LVL IV: CPT | Mod: PBBFAC,,, | Performed by: STUDENT IN AN ORGANIZED HEALTH CARE EDUCATION/TRAINING PROGRAM

## 2024-12-19 PROCEDURE — 3062F POS MACROALBUMINURIA REV: CPT | Mod: CPTII,S$GLB,, | Performed by: STUDENT IN AN ORGANIZED HEALTH CARE EDUCATION/TRAINING PROGRAM

## 2024-12-19 PROCEDURE — 99214 OFFICE O/P EST MOD 30 MIN: CPT | Mod: S$GLB,,, | Performed by: STUDENT IN AN ORGANIZED HEALTH CARE EDUCATION/TRAINING PROGRAM

## 2024-12-19 PROCEDURE — 3008F BODY MASS INDEX DOCD: CPT | Mod: CPTII,S$GLB,, | Performed by: STUDENT IN AN ORGANIZED HEALTH CARE EDUCATION/TRAINING PROGRAM

## 2024-12-19 PROCEDURE — 3078F DIAST BP <80 MM HG: CPT | Mod: CPTII,S$GLB,, | Performed by: STUDENT IN AN ORGANIZED HEALTH CARE EDUCATION/TRAINING PROGRAM

## 2024-12-19 PROCEDURE — 1125F AMNT PAIN NOTED PAIN PRSNT: CPT | Mod: CPTII,S$GLB,, | Performed by: STUDENT IN AN ORGANIZED HEALTH CARE EDUCATION/TRAINING PROGRAM

## 2024-12-19 PROCEDURE — 3288F FALL RISK ASSESSMENT DOCD: CPT | Mod: CPTII,S$GLB,, | Performed by: STUDENT IN AN ORGANIZED HEALTH CARE EDUCATION/TRAINING PROGRAM

## 2024-12-19 PROCEDURE — 3074F SYST BP LT 130 MM HG: CPT | Mod: CPTII,S$GLB,, | Performed by: STUDENT IN AN ORGANIZED HEALTH CARE EDUCATION/TRAINING PROGRAM

## 2024-12-19 PROCEDURE — 3066F NEPHROPATHY DOC TX: CPT | Mod: CPTII,S$GLB,, | Performed by: STUDENT IN AN ORGANIZED HEALTH CARE EDUCATION/TRAINING PROGRAM

## 2024-12-19 PROCEDURE — 1101F PT FALLS ASSESS-DOCD LE1/YR: CPT | Mod: CPTII,S$GLB,, | Performed by: STUDENT IN AN ORGANIZED HEALTH CARE EDUCATION/TRAINING PROGRAM

## 2024-12-19 PROCEDURE — 1159F MED LIST DOCD IN RCRD: CPT | Mod: CPTII,S$GLB,, | Performed by: STUDENT IN AN ORGANIZED HEALTH CARE EDUCATION/TRAINING PROGRAM

## 2024-12-19 PROCEDURE — 3051F HG A1C>EQUAL 7.0%<8.0%: CPT | Mod: CPTII,S$GLB,, | Performed by: STUDENT IN AN ORGANIZED HEALTH CARE EDUCATION/TRAINING PROGRAM

## 2024-12-19 NOTE — PROGRESS NOTES
Subjective:     Patient ID: Jian Arrieta is a 69 y.o. male.    Chief Complaint: Consult (Pt states they want surgery )    Jian is a 69 y.o. male who presents to the clinic for evaluation and treatment of high risk feet. Jian has a past medical history of Alcohol abuse, Anasarca (1/28/2019), Anemia, Anticoagulant long-term use, Arthropathy associated with neurological disorder (9/2/2015), Atherosclerosis, Charcot foot due to diabetes mellitus, Chronic combined systolic and diastolic heart failure (01/29/2019), Chronic pancreatitis (1/28/2019), CKD (chronic kidney disease) stage 3, GFR 30-59 ml/min, CKD (chronic kidney disease) stage 4, GFR 15-29 ml/min, Colon polyps, Coronary artery disease due to calcified coronary lesion (05/08/2015), Diabetic polyneuropathy associated with type 2 diabetes mellitus (9/2/2015), Diverticulosis (1/28/2019), DM type 2 with diabetic peripheral neuropathy (2/4/2019), Encounter for blood transfusion, Essential hypertension (1/28/2019), Former smoker (8/26/2015), Healed ulcer of left foot on examination (6/20/2017), Hematuria, Hydrocele, Hyperphosphatemia, Hypoalbuminemia (2/4/2019), Hypocalcemia, Lymphedema of both lower extremities (1/29/2019), Mixed hyperlipidemia (5/8/2015), Morbid obesity with BMI of 50.0-59.9, adult (5/8/2015), Obstruction of right ureteropelvic junction (UPJ) due to stone (5/24/2021), Onychomycosis of multiple toenails with type 2 diabetes mellitus and peripheral neuropathy (6/20/2017), Perianal cyst, Proteinuria, Pseudocyst of pancreas (1/28/2019), Skin cancer, Sleep apnea (8/26/2015), Status post bariatric surgery (1/11/2016), Type 2 diabetes mellitus, with long-term current use of insulin (5/8/2015), and Urinary tract infection. The patient's chief complaint is foot ulcer, left 2nd toe with dry gangrene. This patient has documented high risk feet requiring routine maintenance secondary to diabetes mellitis and those secondary complications of diabetes, as  mentioned..    PCP: Reza Boyer MD      Sign of Infection: none    Hemoglobin A1C   Date Value Ref Range Status   11/15/2024 7.9 (H) 4.0 - 5.6 % Final     Comment:     ADA Screening Guidelines:  5.7-6.4%  Consistent with prediabetes  >or=6.5%  Consistent with diabetes    High levels of fetal hemoglobin interfere with the HbA1C  assay. Heterozygous hemoglobin variants (HbS, HgC, etc)do  not significantly interfere with this assay.   However, presence of multiple variants may affect accuracy.     05/22/2024 7.2 (H) 4.0 - 5.6 % Final     Comment:     ADA Screening Guidelines:  5.7-6.4%  Consistent with prediabetes  >or=6.5%  Consistent with diabetes    High levels of fetal hemoglobin interfere with the HbA1C  assay. Heterozygous hemoglobin variants (HbS, HgC, etc)do  not significantly interfere with this assay.   However, presence of multiple variants may affect accuracy.     01/18/2024 6.7 (H) 4.0 - 5.6 % Final     Comment:     ADA Screening Guidelines:  5.7-6.4%  Consistent with prediabetes  >or=6.5%  Consistent with diabetes    High levels of fetal hemoglobin interfere with the HbA1C  assay. Heterozygous hemoglobin variants (HbS, HgC, etc)do  not significantly interfere with this assay.   However, presence of multiple variants may affect accuracy.         Review of Systems   Constitutional: Negative for chills, decreased appetite, diaphoresis and fever.   HENT:  Negative for congestion and hearing loss.    Cardiovascular:  Negative for chest pain, claudication and syncope.   Respiratory:  Negative for cough and shortness of breath.    Skin:  Positive for color change, dry skin, nail changes and poor wound healing. Negative for flushing, itching and rash.   Musculoskeletal:  Negative for back pain.   Gastrointestinal:  Negative for nausea and vomiting.   Neurological:  Positive for numbness and paresthesias. Negative for focal weakness and weakness.   Psychiatric/Behavioral:  Negative for altered mental  status. The patient is not nervous/anxious.         Objective:     Physical Exam  Constitutional:       General: He is not in acute distress.     Appearance: Normal appearance. He is well-developed. He is not diaphoretic.   Cardiovascular:      Comments: Dorsalis pedis and posterior tibial pulses are mildly diminished. Skin temperature is within normal limits. Toes are cool to touch and feet are warm proximally. Hair growth is diminished. Skin is mildly atrophic and with mild hyperpigmentation.lymphedema noted, bilaterally.   Musculoskeletal:         General: No tenderness.      Comments: Adequate joint range of motion without pain, limitation, nor crepitation to foot and ankle joints. Muscle strength is 5/5 in all groups bilaterally.       Feet:      Right foot:      Skin integrity: Dry skin present. No ulcer, blister, erythema or warmth.      Left foot:      Skin integrity: Dry skin present. No ulcer, blister, erythema or warmth.   Skin:     General: Skin is warm and dry.      Capillary Refill: Capillary refill takes less than 2 seconds.      Comments: Skin is warm and dry, no acute signs of infection noted bilaterally      Toenails are well trimmed and of normal morphology    See wound description below    Otherwise, no open wounds, macerations or hyperkeratotic lesions, bilaterally            Neurological:      Mental Status: He is alert and oriented to person, place, and time.      Sensory: Sensory deficit present.      Motor: No abnormal muscle tone.      Comments: Light touch within normal limits. [place] 5.07 monofilamant testing is diminished. Vibratory sensation is diminished bilaterally.   Psychiatric:         Mood and Affect: Mood normal.         Behavior: Behavior normal.         Thought Content: Thought content normal.       Dry gangrenous changes to left 2nd digit, unchanged from previous images. No open wounds, no drainage. No signs of infection.       Assessment:      Encounter Diagnosis    Name Primary?    Gangrene Yes     Plan:     Jian was seen today for consult.    Diagnoses and all orders for this visit:    Gangrene  -     Case Request Operating Room: AMPUTATION, TOE      I counseled the patient on his conditions, their implications and medical management.  Continue to keep toe dry. Paint toe with betadine 2x per day. Call clinic or go to urgent care if signs of infection noted  Per previous cardiolgoy notes, Dr. Alonzo, Angiosome supplying the 2nd left toe well-perfused via the anterior tibial/ dorsalis pedis artery. Recommend medical therapy for mid left posterior tibial artery occlusion with aspirin and high-intensity statin.   Plan for toe amputation for gangrene. Case request placed for 1/3/25. Will need follow up with PCP for surgical clearance and for blood thinner management prior to surgery  I discussed with the  patient  signs and symptoms of infection including redness, drainage, purulence, odor, pain, elevated BS, streaking, fever, chills, etc . Patient is to seek medical attention (ER or urgent care) if these symptoms occur    Plan for follow up in wound clinic within 1 week post op

## 2024-12-19 NOTE — TELEPHONE ENCOUNTER
Called pt back to let him know that Dr. Felton would like to see him in clinic, sched pat for this afternoon.

## 2024-12-19 NOTE — H&P (VIEW-ONLY)
Subjective:     Patient ID: Jian Arrieta is a 69 y.o. male.    Chief Complaint: Consult (Pt states they want surgery )    Jian is a 69 y.o. male who presents to the clinic for evaluation and treatment of high risk feet. Jian has a past medical history of Alcohol abuse, Anasarca (1/28/2019), Anemia, Anticoagulant long-term use, Arthropathy associated with neurological disorder (9/2/2015), Atherosclerosis, Charcot foot due to diabetes mellitus, Chronic combined systolic and diastolic heart failure (01/29/2019), Chronic pancreatitis (1/28/2019), CKD (chronic kidney disease) stage 3, GFR 30-59 ml/min, CKD (chronic kidney disease) stage 4, GFR 15-29 ml/min, Colon polyps, Coronary artery disease due to calcified coronary lesion (05/08/2015), Diabetic polyneuropathy associated with type 2 diabetes mellitus (9/2/2015), Diverticulosis (1/28/2019), DM type 2 with diabetic peripheral neuropathy (2/4/2019), Encounter for blood transfusion, Essential hypertension (1/28/2019), Former smoker (8/26/2015), Healed ulcer of left foot on examination (6/20/2017), Hematuria, Hydrocele, Hyperphosphatemia, Hypoalbuminemia (2/4/2019), Hypocalcemia, Lymphedema of both lower extremities (1/29/2019), Mixed hyperlipidemia (5/8/2015), Morbid obesity with BMI of 50.0-59.9, adult (5/8/2015), Obstruction of right ureteropelvic junction (UPJ) due to stone (5/24/2021), Onychomycosis of multiple toenails with type 2 diabetes mellitus and peripheral neuropathy (6/20/2017), Perianal cyst, Proteinuria, Pseudocyst of pancreas (1/28/2019), Skin cancer, Sleep apnea (8/26/2015), Status post bariatric surgery (1/11/2016), Type 2 diabetes mellitus, with long-term current use of insulin (5/8/2015), and Urinary tract infection. The patient's chief complaint is foot ulcer, left 2nd toe with dry gangrene. This patient has documented high risk feet requiring routine maintenance secondary to diabetes mellitis and those secondary complications of diabetes, as  mentioned..    PCP: Reza Boyer MD      Sign of Infection: none    Hemoglobin A1C   Date Value Ref Range Status   11/15/2024 7.9 (H) 4.0 - 5.6 % Final     Comment:     ADA Screening Guidelines:  5.7-6.4%  Consistent with prediabetes  >or=6.5%  Consistent with diabetes    High levels of fetal hemoglobin interfere with the HbA1C  assay. Heterozygous hemoglobin variants (HbS, HgC, etc)do  not significantly interfere with this assay.   However, presence of multiple variants may affect accuracy.     05/22/2024 7.2 (H) 4.0 - 5.6 % Final     Comment:     ADA Screening Guidelines:  5.7-6.4%  Consistent with prediabetes  >or=6.5%  Consistent with diabetes    High levels of fetal hemoglobin interfere with the HbA1C  assay. Heterozygous hemoglobin variants (HbS, HgC, etc)do  not significantly interfere with this assay.   However, presence of multiple variants may affect accuracy.     01/18/2024 6.7 (H) 4.0 - 5.6 % Final     Comment:     ADA Screening Guidelines:  5.7-6.4%  Consistent with prediabetes  >or=6.5%  Consistent with diabetes    High levels of fetal hemoglobin interfere with the HbA1C  assay. Heterozygous hemoglobin variants (HbS, HgC, etc)do  not significantly interfere with this assay.   However, presence of multiple variants may affect accuracy.         Review of Systems   Constitutional: Negative for chills, decreased appetite, diaphoresis and fever.   HENT:  Negative for congestion and hearing loss.    Cardiovascular:  Negative for chest pain, claudication and syncope.   Respiratory:  Negative for cough and shortness of breath.    Skin:  Positive for color change, dry skin, nail changes and poor wound healing. Negative for flushing, itching and rash.   Musculoskeletal:  Negative for back pain.   Gastrointestinal:  Negative for nausea and vomiting.   Neurological:  Positive for numbness and paresthesias. Negative for focal weakness and weakness.   Psychiatric/Behavioral:  Negative for altered mental  status. The patient is not nervous/anxious.         Objective:     Physical Exam  Constitutional:       General: He is not in acute distress.     Appearance: Normal appearance. He is well-developed. He is not diaphoretic.   Cardiovascular:      Comments: Dorsalis pedis and posterior tibial pulses are mildly diminished. Skin temperature is within normal limits. Toes are cool to touch and feet are warm proximally. Hair growth is diminished. Skin is mildly atrophic and with mild hyperpigmentation.lymphedema noted, bilaterally.   Musculoskeletal:         General: No tenderness.      Comments: Adequate joint range of motion without pain, limitation, nor crepitation to foot and ankle joints. Muscle strength is 5/5 in all groups bilaterally.       Feet:      Right foot:      Skin integrity: Dry skin present. No ulcer, blister, erythema or warmth.      Left foot:      Skin integrity: Dry skin present. No ulcer, blister, erythema or warmth.   Skin:     General: Skin is warm and dry.      Capillary Refill: Capillary refill takes less than 2 seconds.      Comments: Skin is warm and dry, no acute signs of infection noted bilaterally      Toenails are well trimmed and of normal morphology    See wound description below    Otherwise, no open wounds, macerations or hyperkeratotic lesions, bilaterally            Neurological:      Mental Status: He is alert and oriented to person, place, and time.      Sensory: Sensory deficit present.      Motor: No abnormal muscle tone.      Comments: Light touch within normal limits. [place]ure[place] 5.07 monofilamant testing is diminished. Vibratory sensation is diminished bilaterally.   Psychiatric:         Mood and Affect: Mood normal.         Behavior: Behavior normal.         Thought Content: Thought content normal.       Dry gangrenous changes to left 2nd digit, unchanged from previous images. No open wounds, no drainage. No signs of infection.       Assessment:      Encounter Diagnosis    Name Primary?    Gangrene Yes     Plan:     Jian was seen today for consult.    Diagnoses and all orders for this visit:    Gangrene  -     Case Request Operating Room: AMPUTATION, TOE      I counseled the patient on his conditions, their implications and medical management.  Continue to keep toe dry. Paint toe with betadine 2x per day. Call clinic or go to urgent care if signs of infection noted  Per previous cardiolgoy notes, Dr. Alonzo, Angiosome supplying the 2nd left toe well-perfused via the anterior tibial/ dorsalis pedis artery. Recommend medical therapy for mid left posterior tibial artery occlusion with aspirin and high-intensity statin.   Plan for toe amputation for gangrene. Case request placed for 1/3/25. Will need follow up with PCP for surgical clearance and for blood thinner management prior to surgery  I discussed with the  patient  signs and symptoms of infection including redness, drainage, purulence, odor, pain, elevated BS, streaking, fever, chills, etc . Patient is to seek medical attention (ER or urgent care) if these symptoms occur    Plan for follow up in wound clinic within 1 week post op

## 2024-12-19 NOTE — TELEPHONE ENCOUNTER
----- Message from Low sent at 12/19/2024 12:03 PM CST -----  Type:  Procedure     Who Called:pt   Does the patient know what this is regarding?:schedule procedure (amputation)   Would the patient rather a call back or a response via [facility]? Call   Best Call Back Number:[phone]  Additional Information:

## 2024-12-23 ENCOUNTER — TELEPHONE (OUTPATIENT)
Dept: PREADMISSION TESTING | Facility: HOSPITAL | Age: 69
End: 2024-12-23
Payer: MEDICARE

## 2024-12-23 NOTE — TELEPHONE ENCOUNTER
Good morning, he is scheduled for Toe amputation on 1/3/25 with Dr. Felton. Have you received Clearance and Plavix recommendations?

## 2024-12-26 ENCOUNTER — TELEPHONE (OUTPATIENT)
Dept: PODIATRY | Facility: CLINIC | Age: 69
End: 2024-12-26
Payer: MEDICARE

## 2024-12-30 ENCOUNTER — TELEPHONE (OUTPATIENT)
Dept: SURGERY | Facility: HOSPITAL | Age: 69
End: 2024-12-30
Payer: MEDICARE

## 2025-01-03 ENCOUNTER — ANESTHESIA EVENT (OUTPATIENT)
Dept: SURGERY | Facility: HOSPITAL | Age: 70
End: 2025-01-03
Payer: MEDICARE

## 2025-01-03 ENCOUNTER — HOSPITAL ENCOUNTER (INPATIENT)
Facility: HOSPITAL | Age: 70
LOS: 3 days | Discharge: HOME HEALTH | DRG: 617 | End: 2025-01-06
Attending: STUDENT IN AN ORGANIZED HEALTH CARE EDUCATION/TRAINING PROGRAM | Admitting: HOSPITALIST
Payer: MEDICARE

## 2025-01-03 ENCOUNTER — ANESTHESIA (OUTPATIENT)
Dept: SURGERY | Facility: HOSPITAL | Age: 70
End: 2025-01-03
Payer: MEDICARE

## 2025-01-03 DIAGNOSIS — I96 GANGRENE: ICD-10-CM

## 2025-01-03 DIAGNOSIS — R07.9 CHEST PAIN: ICD-10-CM

## 2025-01-03 PROBLEM — E11.628 TYPE 2 DIABETES MELLITUS WITH LEFT DIABETIC FOOT INFECTION: Status: ACTIVE | Noted: 2025-01-03

## 2025-01-03 PROBLEM — L08.9 TYPE 2 DIABETES MELLITUS WITH LEFT DIABETIC FOOT INFECTION: Status: ACTIVE | Noted: 2025-01-03

## 2025-01-03 LAB
ANION GAP SERPL CALC-SCNC: 9 MMOL/L (ref 8–16)
BUN SERPL-MCNC: 47 MG/DL (ref 8–23)
CALCIUM SERPL-MCNC: 7.9 MG/DL (ref 8.7–10.5)
CHLORIDE SERPL-SCNC: 96 MMOL/L (ref 95–110)
CO2 SERPL-SCNC: 28 MMOL/L (ref 23–29)
CREAT SERPL-MCNC: 3.7 MG/DL (ref 0.5–1.4)
EST. GFR  (NO RACE VARIABLE): 17 ML/MIN/1.73 M^2
GLUCOSE SERPL-MCNC: 204 MG/DL (ref 70–110)
GLUCOSE SERPL-MCNC: 432 MG/DL (ref 70–110)
POCT GLUCOSE: 226 MG/DL (ref 70–110)
POCT GLUCOSE: 249 MG/DL (ref 70–110)
POCT GLUCOSE: 342 MG/DL (ref 70–110)
POCT GLUCOSE: 416 MG/DL (ref 70–110)
POCT GLUCOSE: 490 MG/DL (ref 70–110)
POCT GLUCOSE: 493 MG/DL (ref 70–110)
POTASSIUM SERPL-SCNC: 4.2 MMOL/L (ref 3.5–5.1)
SODIUM SERPL-SCNC: 133 MMOL/L (ref 136–145)

## 2025-01-03 PROCEDURE — 37000008 HC ANESTHESIA 1ST 15 MINUTES: Performed by: STUDENT IN AN ORGANIZED HEALTH CARE EDUCATION/TRAINING PROGRAM

## 2025-01-03 PROCEDURE — 63600175 PHARM REV CODE 636 W HCPCS: Performed by: STUDENT IN AN ORGANIZED HEALTH CARE EDUCATION/TRAINING PROGRAM

## 2025-01-03 PROCEDURE — 36000706: Performed by: STUDENT IN AN ORGANIZED HEALTH CARE EDUCATION/TRAINING PROGRAM

## 2025-01-03 PROCEDURE — 37000009 HC ANESTHESIA EA ADD 15 MINS: Performed by: STUDENT IN AN ORGANIZED HEALTH CARE EDUCATION/TRAINING PROGRAM

## 2025-01-03 PROCEDURE — 25000003 PHARM REV CODE 250: Performed by: NURSE ANESTHETIST, CERTIFIED REGISTERED

## 2025-01-03 PROCEDURE — 28810 AMPUTATION TOE & METATARSAL: CPT | Mod: T1,,, | Performed by: STUDENT IN AN ORGANIZED HEALTH CARE EDUCATION/TRAINING PROGRAM

## 2025-01-03 PROCEDURE — 99223 1ST HOSP IP/OBS HIGH 75: CPT | Mod: 57,,, | Performed by: STUDENT IN AN ORGANIZED HEALTH CARE EDUCATION/TRAINING PROGRAM

## 2025-01-03 PROCEDURE — 63600175 PHARM REV CODE 636 W HCPCS: Performed by: REGISTERED NURSE

## 2025-01-03 PROCEDURE — 87075 CULTR BACTERIA EXCEPT BLOOD: CPT | Performed by: STUDENT IN AN ORGANIZED HEALTH CARE EDUCATION/TRAINING PROGRAM

## 2025-01-03 PROCEDURE — 80048 BASIC METABOLIC PNL TOTAL CA: CPT | Performed by: STUDENT IN AN ORGANIZED HEALTH CARE EDUCATION/TRAINING PROGRAM

## 2025-01-03 PROCEDURE — 25000003 PHARM REV CODE 250: Performed by: REGISTERED NURSE

## 2025-01-03 PROCEDURE — 63600175 PHARM REV CODE 636 W HCPCS: Performed by: NURSE ANESTHETIST, CERTIFIED REGISTERED

## 2025-01-03 PROCEDURE — 36000707: Performed by: STUDENT IN AN ORGANIZED HEALTH CARE EDUCATION/TRAINING PROGRAM

## 2025-01-03 PROCEDURE — 87186 SC STD MICRODIL/AGAR DIL: CPT | Performed by: STUDENT IN AN ORGANIZED HEALTH CARE EDUCATION/TRAINING PROGRAM

## 2025-01-03 PROCEDURE — 87102 FUNGUS ISOLATION CULTURE: CPT | Performed by: STUDENT IN AN ORGANIZED HEALTH CARE EDUCATION/TRAINING PROGRAM

## 2025-01-03 PROCEDURE — 87015 SPECIMEN INFECT AGNT CONCNTJ: CPT | Performed by: STUDENT IN AN ORGANIZED HEALTH CARE EDUCATION/TRAINING PROGRAM

## 2025-01-03 PROCEDURE — 25000003 PHARM REV CODE 250: Performed by: STUDENT IN AN ORGANIZED HEALTH CARE EDUCATION/TRAINING PROGRAM

## 2025-01-03 PROCEDURE — 36415 COLL VENOUS BLD VENIPUNCTURE: CPT | Performed by: STUDENT IN AN ORGANIZED HEALTH CARE EDUCATION/TRAINING PROGRAM

## 2025-01-03 PROCEDURE — 71000033 HC RECOVERY, INTIAL HOUR: Performed by: STUDENT IN AN ORGANIZED HEALTH CARE EDUCATION/TRAINING PROGRAM

## 2025-01-03 PROCEDURE — 87070 CULTURE OTHR SPECIMN AEROBIC: CPT | Performed by: STUDENT IN AN ORGANIZED HEALTH CARE EDUCATION/TRAINING PROGRAM

## 2025-01-03 PROCEDURE — 87206 SMEAR FLUORESCENT/ACID STAI: CPT | Performed by: STUDENT IN AN ORGANIZED HEALTH CARE EDUCATION/TRAINING PROGRAM

## 2025-01-03 PROCEDURE — 87116 MYCOBACTERIA CULTURE: CPT | Performed by: STUDENT IN AN ORGANIZED HEALTH CARE EDUCATION/TRAINING PROGRAM

## 2025-01-03 PROCEDURE — 63600175 PHARM REV CODE 636 W HCPCS

## 2025-01-03 PROCEDURE — 88305 TISSUE EXAM BY PATHOLOGIST: CPT | Performed by: PATHOLOGY

## 2025-01-03 PROCEDURE — 88307 TISSUE EXAM BY PATHOLOGIST: CPT | Performed by: PATHOLOGY

## 2025-01-03 PROCEDURE — 11000001 HC ACUTE MED/SURG PRIVATE ROOM

## 2025-01-03 PROCEDURE — 87147 CULTURE TYPE IMMUNOLOGIC: CPT | Performed by: STUDENT IN AN ORGANIZED HEALTH CARE EDUCATION/TRAINING PROGRAM

## 2025-01-03 PROCEDURE — 87205 SMEAR GRAM STAIN: CPT | Performed by: STUDENT IN AN ORGANIZED HEALTH CARE EDUCATION/TRAINING PROGRAM

## 2025-01-03 PROCEDURE — 63600175 PHARM REV CODE 636 W HCPCS: Mod: TB | Performed by: STUDENT IN AN ORGANIZED HEALTH CARE EDUCATION/TRAINING PROGRAM

## 2025-01-03 RX ORDER — OXYCODONE HYDROCHLORIDE 5 MG/1
5 TABLET ORAL ONCE
Status: COMPLETED | OUTPATIENT
Start: 2025-01-03 | End: 2025-01-03

## 2025-01-03 RX ORDER — IBUPROFEN 200 MG
16 TABLET ORAL
Status: DISCONTINUED | OUTPATIENT
Start: 2025-01-03 | End: 2025-01-06 | Stop reason: HOSPADM

## 2025-01-03 RX ORDER — OXYCODONE HYDROCHLORIDE 5 MG/1
5 TABLET ORAL ONCE
Status: DISCONTINUED | OUTPATIENT
Start: 2025-01-03 | End: 2025-01-03

## 2025-01-03 RX ORDER — HYDROMORPHONE HYDROCHLORIDE 2 MG/ML
0.2 INJECTION, SOLUTION INTRAMUSCULAR; INTRAVENOUS; SUBCUTANEOUS EVERY 5 MIN PRN
Status: DISCONTINUED | OUTPATIENT
Start: 2025-01-03 | End: 2025-01-03 | Stop reason: HOSPADM

## 2025-01-03 RX ORDER — ATORVASTATIN CALCIUM 40 MG/1
80 TABLET, FILM COATED ORAL NIGHTLY
Status: DISCONTINUED | OUTPATIENT
Start: 2025-01-03 | End: 2025-01-06 | Stop reason: HOSPADM

## 2025-01-03 RX ORDER — ONDANSETRON HYDROCHLORIDE 2 MG/ML
INJECTION, SOLUTION INTRAVENOUS
Status: DISCONTINUED | OUTPATIENT
Start: 2025-01-03 | End: 2025-01-03

## 2025-01-03 RX ORDER — ONDANSETRON HYDROCHLORIDE 2 MG/ML
4 INJECTION, SOLUTION INTRAVENOUS EVERY 8 HOURS PRN
Status: DISCONTINUED | OUTPATIENT
Start: 2025-01-03 | End: 2025-01-06 | Stop reason: HOSPADM

## 2025-01-03 RX ORDER — TORSEMIDE 20 MG/1
20 TABLET ORAL DAILY
Status: DISCONTINUED | OUTPATIENT
Start: 2025-01-04 | End: 2025-01-06 | Stop reason: HOSPADM

## 2025-01-03 RX ORDER — OXYCODONE AND ACETAMINOPHEN 5; 325 MG/1; MG/1
1 TABLET ORAL EVERY 6 HOURS PRN
Status: DISCONTINUED | OUTPATIENT
Start: 2025-01-03 | End: 2025-01-06 | Stop reason: HOSPADM

## 2025-01-03 RX ORDER — MIRTAZAPINE 7.5 MG/1
7.5 TABLET, FILM COATED ORAL NIGHTLY
Status: DISCONTINUED | OUTPATIENT
Start: 2025-01-03 | End: 2025-01-06 | Stop reason: HOSPADM

## 2025-01-03 RX ORDER — ISOSORBIDE MONONITRATE 30 MG/1
30 TABLET, EXTENDED RELEASE ORAL DAILY
Status: DISCONTINUED | OUTPATIENT
Start: 2025-01-04 | End: 2025-01-06 | Stop reason: HOSPADM

## 2025-01-03 RX ORDER — BUPIVACAINE HYDROCHLORIDE 2.5 MG/ML
INJECTION, SOLUTION EPIDURAL; INFILTRATION; INTRACAUDAL
Status: DISCONTINUED | OUTPATIENT
Start: 2025-01-03 | End: 2025-01-03 | Stop reason: HOSPADM

## 2025-01-03 RX ORDER — PROCHLORPERAZINE EDISYLATE 5 MG/ML
5 INJECTION INTRAMUSCULAR; INTRAVENOUS EVERY 30 MIN PRN
Status: DISCONTINUED | OUTPATIENT
Start: 2025-01-03 | End: 2025-01-03 | Stop reason: HOSPADM

## 2025-01-03 RX ORDER — SODIUM CHLORIDE 0.9 % (FLUSH) 0.9 %
10 SYRINGE (ML) INJECTION EVERY 12 HOURS PRN
Status: DISCONTINUED | OUTPATIENT
Start: 2025-01-03 | End: 2025-01-06 | Stop reason: HOSPADM

## 2025-01-03 RX ORDER — PHENYLEPHRINE HYDROCHLORIDE 10 MG/ML
INJECTION INTRAVENOUS
Status: DISCONTINUED | OUTPATIENT
Start: 2025-01-03 | End: 2025-01-03

## 2025-01-03 RX ORDER — OXYCODONE HYDROCHLORIDE 5 MG/1
5 TABLET ORAL
Status: DISCONTINUED | OUTPATIENT
Start: 2025-01-03 | End: 2025-01-03 | Stop reason: HOSPADM

## 2025-01-03 RX ORDER — LIDOCAINE HYDROCHLORIDE 20 MG/ML
INJECTION INTRAVENOUS
Status: DISCONTINUED | OUTPATIENT
Start: 2025-01-03 | End: 2025-01-03

## 2025-01-03 RX ORDER — GLUCAGON 1 MG
1 KIT INJECTION
Status: DISCONTINUED | OUTPATIENT
Start: 2025-01-03 | End: 2025-01-06 | Stop reason: HOSPADM

## 2025-01-03 RX ORDER — IBUPROFEN 200 MG
24 TABLET ORAL
Status: DISCONTINUED | OUTPATIENT
Start: 2025-01-03 | End: 2025-01-06 | Stop reason: SDUPTHER

## 2025-01-03 RX ORDER — GLUCAGON 1 MG
1 KIT INJECTION
Status: DISCONTINUED | OUTPATIENT
Start: 2025-01-03 | End: 2025-01-06 | Stop reason: SDUPTHER

## 2025-01-03 RX ORDER — CLOPIDOGREL BISULFATE 75 MG/1
75 TABLET ORAL DAILY
Status: DISCONTINUED | OUTPATIENT
Start: 2025-01-04 | End: 2025-01-06 | Stop reason: HOSPADM

## 2025-01-03 RX ORDER — IBUPROFEN 200 MG
16 TABLET ORAL
Status: DISCONTINUED | OUTPATIENT
Start: 2025-01-03 | End: 2025-01-06 | Stop reason: SDUPTHER

## 2025-01-03 RX ORDER — ASPIRIN 81 MG/1
81 TABLET ORAL DAILY
Status: DISCONTINUED | OUTPATIENT
Start: 2025-01-04 | End: 2025-01-06 | Stop reason: HOSPADM

## 2025-01-03 RX ORDER — VASOPRESSIN 20 [USP'U]/ML
INJECTION, SOLUTION INTRAMUSCULAR; SUBCUTANEOUS
Status: DISCONTINUED | OUTPATIENT
Start: 2025-01-03 | End: 2025-01-03

## 2025-01-03 RX ORDER — PROPOFOL 10 MG/ML
VIAL (ML) INTRAVENOUS
Status: DISCONTINUED | OUTPATIENT
Start: 2025-01-03 | End: 2025-01-03

## 2025-01-03 RX ORDER — NALOXONE HCL 0.4 MG/ML
0.02 VIAL (ML) INJECTION
Status: DISCONTINUED | OUTPATIENT
Start: 2025-01-03 | End: 2025-01-06 | Stop reason: HOSPADM

## 2025-01-03 RX ORDER — ONDANSETRON HYDROCHLORIDE 2 MG/ML
4 INJECTION, SOLUTION INTRAVENOUS DAILY PRN
Status: DISCONTINUED | OUTPATIENT
Start: 2025-01-03 | End: 2025-01-03 | Stop reason: HOSPADM

## 2025-01-03 RX ORDER — PROPOFOL 10 MG/ML
VIAL (ML) INTRAVENOUS CONTINUOUS PRN
Status: DISCONTINUED | OUTPATIENT
Start: 2025-01-03 | End: 2025-01-03

## 2025-01-03 RX ORDER — LIDOCAINE HYDROCHLORIDE 10 MG/ML
INJECTION, SOLUTION INFILTRATION; PERINEURAL
Status: DISCONTINUED | OUTPATIENT
Start: 2025-01-03 | End: 2025-01-03 | Stop reason: HOSPADM

## 2025-01-03 RX ORDER — VANCOMYCIN 2 GRAM/500 ML IN 0.9 % SODIUM CHLORIDE INTRAVENOUS
2000 ONCE
Status: COMPLETED | OUTPATIENT
Start: 2025-01-03 | End: 2025-01-03

## 2025-01-03 RX ORDER — IBUPROFEN 200 MG
24 TABLET ORAL
Status: DISCONTINUED | OUTPATIENT
Start: 2025-01-03 | End: 2025-01-06 | Stop reason: HOSPADM

## 2025-01-03 RX ADMIN — INSULIN HUMAN 1.6 UNITS/HR: 100 INJECTION, SOLUTION PARENTERAL at 07:01

## 2025-01-03 RX ADMIN — PHENYLEPHRINE HYDROCHLORIDE 200 MCG: 10 INJECTION INTRAVENOUS at 02:01

## 2025-01-03 RX ADMIN — PHENYLEPHRINE HYDROCHLORIDE 200 MCG: 10 INJECTION INTRAVENOUS at 03:01

## 2025-01-03 RX ADMIN — HYDROMORPHONE HYDROCHLORIDE 0.2 MG: 2 INJECTION INTRAMUSCULAR; INTRAVENOUS; SUBCUTANEOUS at 04:01

## 2025-01-03 RX ADMIN — PHENYLEPHRINE HYDROCHLORIDE 100 MCG: 10 INJECTION INTRAVENOUS at 03:01

## 2025-01-03 RX ADMIN — ONDANSETRON 4 MG: 2 INJECTION, SOLUTION INTRAMUSCULAR; INTRAVENOUS at 02:01

## 2025-01-03 RX ADMIN — PROPOFOL 40 MG: 10 INJECTION, EMULSION INTRAVENOUS at 02:01

## 2025-01-03 RX ADMIN — PHENYLEPHRINE HYDROCHLORIDE 150 MCG: 10 INJECTION INTRAVENOUS at 03:01

## 2025-01-03 RX ADMIN — PIPERACILLIN SODIUM AND TAZOBACTAM SODIUM 4.5 G: 4; .5 INJECTION, POWDER, LYOPHILIZED, FOR SOLUTION INTRAVENOUS at 11:01

## 2025-01-03 RX ADMIN — SODIUM CHLORIDE: 0.9 INJECTION, SOLUTION INTRAVENOUS at 02:01

## 2025-01-03 RX ADMIN — MIRTAZAPINE 7.5 MG: 7.5 TABLET, FILM COATED ORAL at 09:01

## 2025-01-03 RX ADMIN — ATORVASTATIN CALCIUM 80 MG: 40 TABLET, FILM COATED ORAL at 09:01

## 2025-01-03 RX ADMIN — OXYCODONE HYDROCHLORIDE AND ACETAMINOPHEN 1 TABLET: 5; 325 TABLET ORAL at 09:01

## 2025-01-03 RX ADMIN — OXYCODONE 5 MG: 5 TABLET ORAL at 03:01

## 2025-01-03 RX ADMIN — VANCOMYCIN HYDROCHLORIDE 2000 MG: 500 INJECTION, POWDER, LYOPHILIZED, FOR SOLUTION INTRAVENOUS at 07:01

## 2025-01-03 RX ADMIN — LIDOCAINE HYDROCHLORIDE 50 MG: 20 INJECTION, SOLUTION INTRAVENOUS at 02:01

## 2025-01-03 RX ADMIN — PHENYLEPHRINE HYDROCHLORIDE 150 MCG: 10 INJECTION INTRAVENOUS at 02:01

## 2025-01-03 RX ADMIN — PROPOFOL 125 MCG/KG/MIN: 10 INJECTION, EMULSION INTRAVENOUS at 02:01

## 2025-01-03 RX ADMIN — VASOPRESSIN 0.5 UNITS: 20 INJECTION INTRAVENOUS at 03:01

## 2025-01-03 NOTE — TRANSFER OF CARE
"Anesthesia Transfer of Care Note    Patient: Jian Arrieta    Procedure(s) Performed: Procedure(s) (LRB):  AMPUTATION, TOE (Left)    Patient location: PACU    Anesthesia Type: general    Transport from OR: Transported from OR on room air with adequate spontaneous ventilation    Post pain: adequate analgesia    Post assessment: no apparent anesthetic complications and tolerated procedure well    Post vital signs: stable    Level of consciousness: awake, alert and oriented    Nausea/Vomiting: no nausea/vomiting    Complications: none    Transfer of care protocol was followed      Last vitals: Visit Vitals  /61 (BP Location: Right arm, Patient Position: Lying)   Pulse 71   Temp 36.7 °C (98.1 °F) (Tympanic)   Resp 16   Ht 5' 7" (1.702 m)   Wt 116.6 kg (257 lb)   SpO2 98%   BMI 40.25 kg/m²     "

## 2025-01-03 NOTE — BRIEF OP NOTE
[facility] - Surgery (Hospital)  Brief Operative Note    Surgery Date: 1/3/2025     Surgeons and Role:     * Savanah Felton DPM - Primary    Assisting Surgeon: None    Pre-op Diagnosis:  Gangrene [I96]    Post-op Diagnosis:  Post-Op Diagnosis Codes:     * Gangrene [I96]    Procedure(s) (LRB):  AMPUTATION, TOE (Left)    Anesthesia: Local MAC    Operative Findings: s/p left 2nd toe amputation, malodor and mild purulent drainage noted. Bone is soft and fragmented to proximal phalanx.     Estimated Blood Loss: * No values recorded between 1/3/2025  2:53 PM and 1/3/2025  3:04 PM *         Specimens:   Specimen (24h ago, onward)      None              Discharge Note    OUTCOME:  tolerated procedure well, to be admitted to Hospital Medicine    DISPOSITION: Admitted as an Inpatient, to be admitted under Dr. Jimenes for wet gangrene and acute osteomyelitis    FINAL DIAGNOSIS:  <principal problem not specified>    FOLLOWUP: In clinic    DISCHARGE INSTRUCTIONS:  No discharge procedures on file.    The patient tolerated the procedure and anesthesia well. Patient was transferred to the recovery room with vital signs stable and vascular status intact to the distal foot. This is part of a staged procedure, wounds will likely require serial debridements in the future.     Following a period of post op monitoring, the patient will be transferred to room on the following written and oral post op instructions:     1. Keep dressing dry and intact, if strike-through noted, please reinforce dressing with kerlix and ACE  2. Patient non-weightbearing to surgical extremity for next two days  3. Elevate surgical foot when at rest, Z-flex boots to bilateral lower extremity     Contact podiatry for all post op follow up care and if any problems arise.

## 2025-01-03 NOTE — ANESTHESIA POSTPROCEDURE EVALUATION
Anesthesia Post Evaluation    Patient: Jian Arrieta    Procedure(s) Performed: Procedure(s) (LRB):  AMPUTATION, TOE (Left)    Final Anesthesia Type: general      Patient location during evaluation: PACU  Patient participation: Yes- Able to Participate  Level of consciousness: awake and alert  Post-procedure vital signs: reviewed and stable  Pain management: adequate  Airway patency: patent    PONV status at discharge: No PONV  Anesthetic complications: no      Cardiovascular status: stable  Respiratory status: room air  Hydration status: euvolemic  Follow-up not needed.              Vitals Value Taken Time   /66 01/03/25 1616   Temp 36.7 °C (98 °F) 01/03/25 1610   Pulse 74 01/03/25 1621   Resp 10 01/03/25 1620   SpO2 98 % 01/03/25 1621   Vitals shown include unfiled device data.      Event Time   Out of Recovery 16:18:43         Pain/Jason Score: Pain Rating Prior to Med Admin: 6 (1/3/2025  4:00 PM)  Jason Score: 10 (1/3/2025  4:15 PM)

## 2025-01-03 NOTE — ANESTHESIA PREPROCEDURE EVALUATION
Jian Arrieta is a 69 y.o. male       Prev airway (6/30/21):     Induction:  Intravenous    Intubated:  Postinduction    Mask Ventilation:  Easy with oral airway    Attempts:  1    Attempted By:  Student    Method of Intubation:  Video laryngoscopy    Blade:  Butcher 3    Laryngeal View Grade: Grade I - full view of chords      Difficult Airway Encountered?: No      Complications:  None    Airway Device Size:  7.5    Style/Cuff Inflation:  Cuffed (inflated to minimal occlusive pressure)    Placement Verified By:  Capnometry    Findings Post-Intubation:  BS equal bilateral    EKG (10/12/23):   Vent. Rate : 077 BPM     Atrial Rate : 077 BPM      P-R Int : 182 ms          QRS Dur : 114 ms       QT Int : 434 ms       P-R-T Axes : 061 -57 081 degrees      QTc Int : 491 ms     Normal sinus rhythm   Left axis deviation   Nonspecific T wave abnormality   Abnormal ECG     2D Echo (10/13/2023):   Left Ventricle: The left ventricle is normal in size. Normal wall thickness. Septal motion is consistent with post-operative status. There is low normal systolic function with a visually estimated ejection fraction of 50 - 55%. There is normal diastolic function.    Right Ventricle: Normal right ventricular cavity size. Wall thickness is normal. Right ventricle wall motion  is normal. Systolic function is normal.    Pulmonary Artery: The estimated pulmonary artery systolic pressure is 21 mmHg.    IVC/SVC: Normal venous pressure at 3 mmHg.      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), managed as type 1    Sleep apnea    Diabetic polyneuropathy associated with type 2 diabetes mellitus    Status post bariatric surgery    Onychomycosis of multiple toenails with type 2 diabetes mellitus and peripheral neuropathy    Aortic atherosclerosis    Other chronic pancreatitis    Pseudocyst of pancreas    Lymphedema of both lower  extremities    Type 2 diabetes mellitus with diabetic neuropathy, with long-term current use of insulin    Hypoalbuminemia    Other ascites    Anemia    Paroxysmal atrial fibrillation    Hypertensive emergency    Hyperlipidemia associated with type 2 diabetes mellitus    Hepatic fibrosis    Portal hypertension due to obstruction of extrahepatic portal vein    Gastritis    Medically noncompliant    Current use of long term anticoagulation    Other cirrhosis of liver    Atherosclerosis of aorta    Class 3 obesity    Hx of CABG    Vitamin D deficiency    Obstruction of right ureteropelvic junction (UPJ) due to stone    Bilateral hydrocele    Bilateral kidney stones    Hematoma of scrotum    Ureteral stone    History of colon polyps    Venous insufficiency of both lower extremities    Pseudomonas aeruginosa infection    MSSA infection, non-invasive    Diabetes mellitus type 1, with complication, on long term insulin pump    Right flank pain    Hematuria, microscopic    Venous stasis dermatitis of both lower extremities    Insulin pump status    Hydronephrosis    Diabetic nephropathy associated with type 2 diabetes mellitus    Nephrotic range proteinuria    Stage 4 chronic kidney disease    Chronic combined systolic and diastolic congestive heart failure    Type 2 diabetes mellitus with stage 4 chronic kidney disease, with long-term current use of insulin    Gangrene of toe of left foot    Critical limb ischemia of left lower extremity       Review of patient's allergies indicates:  No Known Allergies     No current facility-administered medications on file prior to encounter.     Current Outpatient Medications on File Prior to Encounter   Medication Sig Dispense Refill    isosorbide mononitrate (IMDUR) 30 MG 24 hr tablet Take 1 tablet (30 mg total) by mouth once daily. 90 tablet 3    acetaminophen (TYLENOL) 325 MG tablet Take 2 tablets (650 mg total) by mouth every 8 (eight) hours as needed for Pain. 30 tablet 0     "aspirin (ECOTRIN) 81 MG EC tablet Take 1 tablet (81 mg total) by mouth once daily. 90 tablet 3    blood pressure monitor Kit 1 kit by Misc.(Non-Drug; Combo Route) route 2 (two) times a day. 1 each 0    blood sugar diagnostic (ONETOUCH VERIO TEST STRIPS) Strp 1 each by Misc.(Non-Drug; Combo Route) route 2 (two) times a day. 70 each 3    blood-glucose sensor (DEXCOM G6 SENSOR) Sandi Inject 1 each into the skin every 10 days. 9 each 3    blood-glucose transmitter (DEXCOM G6 TRANSMITTER) Sandi Inject 1 each into the skin every 10 days. 1 each 3    clopidogreL (PLAVIX) 75 mg tablet Take 1 tablet (75 mg total) by mouth once daily. 90 tablet 3    glucagon (BAQSIMI) 3 mg/actuation Spry 1 each by Nasal route daily as needed (if glucose is less than 70 - can repeat in 1 hour if still low/less than 70). 2 each 3    HUMALOG KWIKPEN INSULIN 100 unit/mL pen Inject 5 Units into the skin before meals as needed (before each meal - if OFF of insulin pump). This is emergency back up insulin to use with meals if OFF of insulin pump 15 mL 3    insulin detemir U-100, Levemir, 100 unit/mL (3 mL) SubQ InPn pen Inject 30 Units into the skin every evening. This is emergency back up insulin only if OFF of your insulin pump 15 mL 3    insulin lispro-aabc (LYUMJEV U-100 INSULIN) 100 unit/mL Inject 80 Units into the skin continuous. Uses up to 80 units daily with omnipod 5 insulin pump as directed. 80 mL 6    insulin pump cart,automated,BT (OMNIPOD 5 G6 PODS, GEN 5,) Crtg INJECT 1 EACH INTO THE SKIN EVERY OTHER DAY. 15 each 11    lancets (ONETOUCH DELICA PLUS LANCET) 33 gauge Misc 1 lancet  by Misc.(Non-Drug; Combo Route) route 2 (two) times daily. 70 each 3    pen needle, diabetic 32 gauge x 5/32" Ndle Use with toujeo insulin one daily only as Emergency use/back up insulin - if OFF OF your insulin pump. 30 each 3    rosuvastatin (CRESTOR) 20 MG tablet Take 20 mg by mouth once daily.      torsemide (DEMADEX) 20 MG Tab Take 1 tablet (20 mg total) " by mouth once daily. 90 tablet 3       Past Surgical History:   Procedure Laterality Date    ANGIOGRAPHY N/A 6/28/2019    Procedure: ANGIOGRAM-PV STENT;  Surgeon: Ewa Diagnostic Provider;  Location: [facility] OR;  Service: Radiology;  Laterality: N/A;    ANGIOPLASTY      total x5 stents    AORTOGRAPHY WITH SERIALOGRAPHY N/A 11/26/2024    Procedure: AORTOGRAM, WITH SERIALOGRAPHY;  Surgeon: Clinton Gibbs MD;  Location: [facility] CATH LAB/EP;  Service: Cardiology;  Laterality: N/A;    COLONOSCOPY N/A 10/6/2015    Procedure: COLONOSCOPY;  Surgeon: Shekhar Richards MD;  Location: [facility] ENDO (2ND FLR);  Service: Endoscopy;  Laterality: N/A;  BMI over 55/2nd floor case    5 day hold Plavix, Dr Kwadwo Arroyo    COLONOSCOPY N/A 5/13/2021    Procedure: COLONOSCOPY;  Surgeon: Huan Brumfield MD;  Location: [facility] ENDO;  Service: Endoscopy;  Laterality: N/A;    CORONARY ANGIOGRAPHY Right 3/20/2019    Procedure: ANGIOGRAM, CORONARY ARTERY;  Surgeon: Bob Duque MD;  Location: [facility] CATH LAB;  Service: Cardiology;  Laterality: Right;    CORONARY ARTERY BYPASS GRAFT  2017    x3    CORONARY BYPASS GRAFT ANGIOGRAPHY  3/20/2019    Procedure: Bypass graft study;  Surgeon: Bob Duque MD;  Location: [facility] CATH LAB;  Service: Cardiology;;    CYST REMOVAL      CYSTOSCOPY Right 6/30/2021    Procedure: CYSTOSCOPY;  Surgeon: William Diaz MD;  Location: [facility] OR 1ST FLR;  Service: Urology;  Laterality: Right;    CYSTOSCOPY N/A 10/16/2023    Procedure: CYSTOSCOPY;  Surgeon: Chrystal Dias MD;  Location: [facility] OR 1ST FLR;  Service: Urology;  Laterality: N/A;    CYSTOSCOPY N/A 12/6/2023    Procedure: CYSTOSCOPY;  Surgeon: William Diaz MD;  Location: [facility] OR 1ST FLR;  Service: Urology;  Laterality: N/A;    CYSTOSCOPY W/ URETERAL STENT PLACEMENT Right 6/16/2021    Procedure: CYSTOSCOPY, WITH URETERAL STENT INSERTION;  Surgeon: William Diaz MD;  Location: [facility] OR 2ND FLR;  Service: Urology;  Laterality: Right;  FLUORO LESS THAN 1  HOUR    ENDOSCOPIC ULTRASOUND OF UPPER GASTROINTESTINAL TRACT N/A 2/26/2020    Procedure: ULTRASOUND, UPPER GI TRACT, ENDOSCOPIC;  Surgeon: Robbie Yang MD;  Location: [facility] ENDO;  Service: Endoscopy;  Laterality: N/A;    ESOPHAGOGASTRODUODENOSCOPY N/A 7/8/2019    Procedure: ESOPHAGOGASTRODUODENOSCOPY (EGD);  Surgeon: Huan Brumfield MD;  Location: [facility] ENDO;  Service: Endoscopy;  Laterality: N/A;    ESOPHAGOGASTRODUODENOSCOPY N/A 5/13/2021    Procedure: EGD (ESOPHAGOGASTRODUODENOSCOPY);  Surgeon: Huan Brumfield MD;  Location: [facility] ENDO;  Service: Endoscopy;  Laterality: N/A;    EXCISION OF HYDROCELE Bilateral 6/16/2021    Procedure: HYDROCELE REPAIR;  Surgeon: William Diaz MD;  Location: [facility] OR 2ND FLR;  Service: Urology;  Laterality: Bilateral;  2 HOURS    EXTRACTION - STONE Right 12/6/2023    Procedure: EXTRACTION - STONE;  Surgeon: William Diaz MD;  Location: [facility] OR 1ST FLR;  Service: Urology;  Laterality: Right;    FLUOROSCOPY N/A 6/16/2021    Procedure: FLUOROSCOPY;  Surgeon: William Diaz MD;  Location: [facility] OR 2ND FLR;  Service: Urology;  Laterality: N/A;    GASTRECTOMY      INSERTION OF DIALYSIS CATHETER N/A 5/17/2019    Procedure: pleurx;  Surgeon: Ewa Diagnostic Provider;  Location: [facility] OR 2ND FLR;  Service: General;  Laterality: N/A;  Room Pending sale to [facility]/Located within [facility]    KNEE ARTHROSCOPY      LAPAROSCOPIC CHOLECYSTECTOMY N/A 5/4/2020    Procedure: CHOLECYSTECTOMY, LAPAROSCOPIC;  Surgeon: SON Rowe MD;  Location: [facility] OR;  Service: General;  Laterality: N/A;    LASER LITHOTRIPSY N/A 6/30/2021    Procedure: LITHOTRIPSY, USING LASER;  Surgeon: William Diaz MD;  Location: [facility] OR 1ST FLR;  Service: Urology;  Laterality: N/A;    LIVER BIOPSY N/A 5/4/2020    Procedure: BIOPSY, LIVER, Laproscopic ;  Surgeon: SON Rowe MD;  Location: [facility] OR;  Service: General;  Laterality: N/A;    perianal surgery      perianal cyst removed    PYELOSCOPY Right 6/30/2021    Procedure: PYELOSCOPY;  Surgeon:  William Diaz MD;  Location: [facility] OR 1ST FLR;  Service: Urology;  Laterality: Right;    PYELOSCOPY Right 10/16/2023    Procedure: PYELOSCOPY;  Surgeon: Chrystal Dias MD;  Location: [facility] OR 1ST FLR;  Service: Urology;  Laterality: Right;    PYELOSCOPY Right 12/6/2023    Procedure: PYELOSCOPY;  Surgeon: William Diaz MD;  Location: [facility] OR 1ST FLR;  Service: Urology;  Laterality: Right;    REMOVAL-STENT Right 12/6/2023    Procedure: REMOVAL-STENT;  Surgeon: William Diaz MD;  Location: [facility] OR 1ST FLR;  Service: Urology;  Laterality: Right;    REPLACEMENT OF STENT Right 6/30/2021    Procedure: REPLACEMENT, STENT;  Surgeon: William Diaz MD;  Location: [facility] OR 1ST FLR;  Service: Urology;  Laterality: Right;    RETROGRADE PYELOGRAPHY Right 10/16/2023    Procedure: PYELOGRAM, RETROGRADE;  Surgeon: Chrystal Dias MD;  Location: [facility] OR 1ST FLR;  Service: Urology;  Laterality: Right;    RETROGRADE PYELOGRAPHY Right 12/6/2023    Procedure: PYELOGRAM, RETROGRADE;  Surgeon: William Diaz MD;  Location: [facility] OR 1ST FLR;  Service: Urology;  Laterality: Right;    URETERAL STENT PLACEMENT Right 10/16/2023    Procedure: INSERTION, STENT, URETER;  Surgeon: Chrystal Dias MD;  Location: [facility] OR 1ST FLR;  Service: Urology;  Laterality: Right;    URETEROSCOPY Right 6/30/2021    Procedure: URETEROSCOPY FLEXIBLE URETEROSCOPE;  Surgeon: William Diaz MD;  Location: [facility] OR 1ST FLR;  Service: Urology;  Laterality: Right;    URETEROSCOPY Right 10/16/2023    Procedure: URETEROSCOPY;  Surgeon: Chrystal Dias MD;  Location: [facility] OR 1ST FLR;  Service: Urology;  Laterality: Right;    URETEROSCOPY Right 12/6/2023    Procedure: URETEROSCOPY;  Surgeon: William Diaz MD;  Location: [facility] OR 1ST FLR;  Service: Urology;  Laterality: Right;       Social History     Socioeconomic History    Marital status:    Tobacco Use    Smoking status: Former     Current packs/day: 0.00     Average packs/day:  "2.0 packs/day for 30.0 years (60.0 ttl pk-yrs)     Types: Cigarettes     Start date: 1975     Quit date: 2005     Years since quittin.9    Smokeless tobacco: Never   Substance and Sexual Activity    Alcohol use: No     Comment: started ~, reports 1 shot daily, max 3 shots daily, vague about alcohol consumption. Last drink 2018    Drug use: No     Social Drivers of Health     Financial Resource Strain: Patient Declined (2024)    Overall Financial Resource Strain (CARDIA)     Difficulty of Paying Living Expenses: Patient declined   Food Insecurity: Patient Declined (2024)    Hunger Vital Sign     Worried About Running Out of Food in the Last Year: Patient declined     Ran Out of Food in the Last Year: Patient declined   Transportation Needs: No Transportation Needs (2024)    TRANSPORTATION NEEDS     Transportation : No   Physical Activity: Patient Declined (2024)    Exercise Vital Sign     Days of Exercise per Week: Patient declined     Minutes of Exercise per Session: Patient declined   Stress: Patient Declined (2024)    [employer] [place] [employer] - Occupational Stress Questionnaire     Feeling of Stress : Patient declined   Housing Stability: Low Risk  (2024)    Housing Stability Vital Sign     Unable to Pay for Housing in the Last Year: No     Homeless in the Last Year: No         Vital Signs Range (Last 24H):  Temp:  [36.7 °C (98.1 °F)]   Pulse:  [71]   Resp:  [16]   BP: (115)/(61)   SpO2:  [98 %]       CBC:   No results for input(s): "WBC", "RBC", "HGB", "HCT", "PLT", "MCV", "MCH", "MCHC" in the last 72 hours.      CMP:   Recent Labs     25  1133   *   K 4.2   CL 96   CO2 28   BUN 47*   CREATININE 3.7*   *   CALCIUM 7.9*       INR  No results for input(s): "PT", "INR", "PROTIME", "APTT" in the last 72 hours.          Pre-op Assessment    I have reviewed the Patient Summary Reports.     I have reviewed the Nursing Notes. I have " reviewed the NPO Status.   I have reviewed the Medications.     Review of Systems  Anesthesia Hx:  No problems with previous Anesthesia             Denies Family Hx of Anesthesia complications.    Denies Personal Hx of Anesthesia complications.                    Hematology/Oncology:    Oncology Normal                                   Cardiovascular:     Hypertension   CAD   CABG/stent    CHF    hyperlipidemia    Previous 3vCABG In 2017  Patient denies cardiac issues since recovering from surgery  Patient on beta blockers                          Pulmonary:        Sleep Apnea                Renal/:  Chronic Renal Disease                Hepatic/GI:      Liver Disease,               Neurological:    Denies CVA. Neuromuscular Disease,   Seizures                                Endocrine:  Diabetes, poorly controlled, using insulin   Glucose >400 on arrival to hospital. Insulin pump inadvertently removed overnight. Replaced this morning now with downtrending glucose. No symptoms of DKA/HHS      Morbid Obesity / BMI > 40      Physical Exam  General: Alert, Oriented and Cooperative    Airway:  Mallampati: II   Mouth Opening: Normal  TM Distance: Normal  Tongue: Normal  Neck ROM: Normal ROM    Dental:  Periodontal disease        Anesthesia Plan  Type of Anesthesia, risks & benefits discussed:    Anesthesia Type: Gen Natural Airway  Intra-op Monitoring Plan: Standard ASA Monitors  Post Op Pain Control Plan: IV/PO Opioids PRN and multimodal analgesia  Induction:  IV  Informed Consent: Informed consent signed with the Patient and all parties understand the risks and agree with anesthesia plan.  All questions answered.   ASA Score: 3  Day of Surgery Review of History & Physical: H&P Update referred to the surgeon/provider.H&P completed by Anesthesiologist.    Ready For Surgery From Anesthesia Perspective.     .

## 2025-01-03 NOTE — INTERVAL H&P NOTE
The patient has been examined and the H&P has been reviewed:    I concur with the findings and changes have been noted since the H&P was written: Increased drainage, purulence, erythema, and edema of L 2nd digit with noted malodor has developed since last seen. Ok to proceed with amputation.    Surgery risks, benefits and alternative options discussed and understood by patient/family.          There are no hospital problems to display for this patient.

## 2025-01-04 PROBLEM — I96 GANGRENE: Status: ACTIVE | Noted: 2025-01-04

## 2025-01-04 PROBLEM — I50.32 CHRONIC DIASTOLIC CONGESTIVE HEART FAILURE: Status: ACTIVE | Noted: 2024-02-23

## 2025-01-04 PROBLEM — N18.4 CKD (CHRONIC KIDNEY DISEASE) STAGE 4, GFR 15-29 ML/MIN: Status: ACTIVE | Noted: 2025-01-04

## 2025-01-04 LAB
ALBUMIN SERPL BCP-MCNC: 2.1 G/DL (ref 3.5–5.2)
ALP SERPL-CCNC: 153 U/L (ref 40–150)
ALT SERPL W/O P-5'-P-CCNC: 62 U/L (ref 10–44)
ANION GAP SERPL CALC-SCNC: 8 MMOL/L (ref 8–16)
AST SERPL-CCNC: 69 U/L (ref 10–40)
BASOPHILS # BLD AUTO: 0.04 K/UL (ref 0–0.2)
BASOPHILS NFR BLD: 0.7 % (ref 0–1.9)
BILIRUB SERPL-MCNC: 0.3 MG/DL (ref 0.1–1)
BUN SERPL-MCNC: 42 MG/DL (ref 8–23)
CALCIUM SERPL-MCNC: 7.7 MG/DL (ref 8.7–10.5)
CHLORIDE SERPL-SCNC: 100 MMOL/L (ref 95–110)
CO2 SERPL-SCNC: 28 MMOL/L (ref 23–29)
CREAT SERPL-MCNC: 3.7 MG/DL (ref 0.5–1.4)
DIFFERENTIAL METHOD BLD: ABNORMAL
EOSINOPHIL # BLD AUTO: 0.2 K/UL (ref 0–0.5)
EOSINOPHIL NFR BLD: 3.3 % (ref 0–8)
ERYTHROCYTE [DISTWIDTH] IN BLOOD BY AUTOMATED COUNT: 14.1 % (ref 11.5–14.5)
EST. GFR  (NO RACE VARIABLE): 17 ML/MIN/1.73 M^2
ESTIMATED AVG GLUCOSE: 232 MG/DL (ref 68–131)
GLUCOSE SERPL-MCNC: 180 MG/DL (ref 70–110)
GLUCOSE SERPL-MCNC: 190 MG/DL (ref 70–110)
GLUCOSE SERPL-MCNC: 199 MG/DL (ref 70–110)
GLUCOSE SERPL-MCNC: 231 MG/DL (ref 70–110)
GLUCOSE SERPL-MCNC: 269 MG/DL (ref 70–110)
GLUCOSE SERPL-MCNC: 283 MG/DL (ref 70–110)
GRAM STN SPEC: NORMAL
GRAM STN SPEC: NORMAL
HBA1C MFR BLD: 9.7 % (ref 4–5.6)
HCT VFR BLD AUTO: 27.3 % (ref 40–54)
HGB BLD-MCNC: 8.8 G/DL (ref 14–18)
IMM GRANULOCYTES # BLD AUTO: 0.01 K/UL (ref 0–0.04)
IMM GRANULOCYTES NFR BLD AUTO: 0.2 % (ref 0–0.5)
LYMPHOCYTES # BLD AUTO: 1.6 K/UL (ref 1–4.8)
LYMPHOCYTES NFR BLD: 26.9 % (ref 18–48)
MCH RBC QN AUTO: 29.5 PG (ref 27–31)
MCHC RBC AUTO-ENTMCNC: 32.2 G/DL (ref 32–36)
MCV RBC AUTO: 92 FL (ref 82–98)
MONOCYTES # BLD AUTO: 0.4 K/UL (ref 0.3–1)
MONOCYTES NFR BLD: 7.3 % (ref 4–15)
NEUTROPHILS # BLD AUTO: 3.7 K/UL (ref 1.8–7.7)
NEUTROPHILS NFR BLD: 61.6 % (ref 38–73)
NRBC BLD-RTO: 0 /100 WBC
PLATELET # BLD AUTO: 179 K/UL (ref 150–450)
PMV BLD AUTO: 10.7 FL (ref 9.2–12.9)
POCT GLUCOSE: 143 MG/DL (ref 70–110)
POCT GLUCOSE: 204 MG/DL (ref 70–110)
POCT GLUCOSE: 285 MG/DL (ref 70–110)
POTASSIUM SERPL-SCNC: 4.1 MMOL/L (ref 3.5–5.1)
PROT SERPL-MCNC: 6.4 G/DL (ref 6–8.4)
RBC # BLD AUTO: 2.98 M/UL (ref 4.6–6.2)
SODIUM SERPL-SCNC: 136 MMOL/L (ref 136–145)
VANCOMYCIN SERPL-MCNC: 17.5 UG/ML
WBC # BLD AUTO: 6.03 K/UL (ref 3.9–12.7)

## 2025-01-04 PROCEDURE — 0Y6S0Z0 DETACHMENT AT LEFT 2ND TOE, COMPLETE, OPEN APPROACH: ICD-10-PCS | Performed by: STUDENT IN AN ORGANIZED HEALTH CARE EDUCATION/TRAINING PROGRAM

## 2025-01-04 PROCEDURE — 11000001 HC ACUTE MED/SURG PRIVATE ROOM

## 2025-01-04 PROCEDURE — 85025 COMPLETE CBC W/AUTO DIFF WBC: CPT | Performed by: REGISTERED NURSE

## 2025-01-04 PROCEDURE — 80202 ASSAY OF VANCOMYCIN: CPT | Performed by: REGISTERED NURSE

## 2025-01-04 PROCEDURE — 63600175 PHARM REV CODE 636 W HCPCS: Performed by: REGISTERED NURSE

## 2025-01-04 PROCEDURE — 99222 1ST HOSP IP/OBS MODERATE 55: CPT | Mod: ,,, | Performed by: INTERNAL MEDICINE

## 2025-01-04 PROCEDURE — 25000003 PHARM REV CODE 250: Performed by: HOSPITALIST

## 2025-01-04 PROCEDURE — 36415 COLL VENOUS BLD VENIPUNCTURE: CPT | Performed by: REGISTERED NURSE

## 2025-01-04 PROCEDURE — 83036 HEMOGLOBIN GLYCOSYLATED A1C: CPT | Performed by: REGISTERED NURSE

## 2025-01-04 PROCEDURE — 25000003 PHARM REV CODE 250: Performed by: REGISTERED NURSE

## 2025-01-04 PROCEDURE — 63600175 PHARM REV CODE 636 W HCPCS: Performed by: HOSPITALIST

## 2025-01-04 PROCEDURE — 80053 COMPREHEN METABOLIC PANEL: CPT | Performed by: REGISTERED NURSE

## 2025-01-04 PROCEDURE — 99024 POSTOP FOLLOW-UP VISIT: CPT | Mod: GC,,, | Performed by: PODIATRIST

## 2025-01-04 RX ORDER — FUROSEMIDE 40 MG/1
40 TABLET ORAL DAILY
Status: ON HOLD | COMMUNITY
Start: 2024-07-18 | End: 2025-01-04

## 2025-01-04 RX ORDER — CALCITRIOL 0.25 UG/1
0.25 CAPSULE ORAL DAILY
Status: DISCONTINUED | OUTPATIENT
Start: 2025-01-04 | End: 2025-01-06 | Stop reason: HOSPADM

## 2025-01-04 RX ORDER — AMLODIPINE BESYLATE 10 MG/1
1 TABLET ORAL DAILY
Status: ON HOLD | COMMUNITY
End: 2025-01-06 | Stop reason: HOSPADM

## 2025-01-04 RX ORDER — METOPROLOL SUCCINATE 50 MG/1
50 TABLET, EXTENDED RELEASE ORAL 2 TIMES DAILY
COMMUNITY

## 2025-01-04 RX ORDER — METOPROLOL SUCCINATE 50 MG/1
50 TABLET, EXTENDED RELEASE ORAL 2 TIMES DAILY
Status: DISCONTINUED | OUTPATIENT
Start: 2025-01-04 | End: 2025-01-06 | Stop reason: HOSPADM

## 2025-01-04 RX ORDER — CALCITRIOL 0.25 UG/1
0.25 CAPSULE ORAL DAILY
COMMUNITY
Start: 2024-10-17 | End: 2025-10-17

## 2025-01-04 RX ADMIN — ASPIRIN 81 MG: 81 TABLET, COATED ORAL at 08:01

## 2025-01-04 RX ADMIN — CLOPIDOGREL BISULFATE 75 MG: 75 TABLET ORAL at 08:01

## 2025-01-04 RX ADMIN — PIPERACILLIN SODIUM AND TAZOBACTAM SODIUM 4.5 G: 4; .5 INJECTION, POWDER, LYOPHILIZED, FOR SOLUTION INTRAVENOUS at 11:01

## 2025-01-04 RX ADMIN — OXYCODONE HYDROCHLORIDE AND ACETAMINOPHEN 1 TABLET: 5; 325 TABLET ORAL at 04:01

## 2025-01-04 RX ADMIN — TORSEMIDE 20 MG: 20 TABLET ORAL at 08:01

## 2025-01-04 RX ADMIN — ISOSORBIDE MONONITRATE 30 MG: 30 TABLET, EXTENDED RELEASE ORAL at 08:01

## 2025-01-04 RX ADMIN — CALCITRIOL CAPSULES 0.25 MCG 0.25 MCG: 0.25 CAPSULE ORAL at 03:01

## 2025-01-04 RX ADMIN — MIRTAZAPINE 7.5 MG: 7.5 TABLET, FILM COATED ORAL at 08:01

## 2025-01-04 RX ADMIN — ATORVASTATIN CALCIUM 80 MG: 40 TABLET, FILM COATED ORAL at 08:01

## 2025-01-04 RX ADMIN — OXYCODONE HYDROCHLORIDE AND ACETAMINOPHEN 1 TABLET: 5; 325 TABLET ORAL at 03:01

## 2025-01-04 RX ADMIN — OXYCODONE HYDROCHLORIDE AND ACETAMINOPHEN 1 TABLET: 5; 325 TABLET ORAL at 09:01

## 2025-01-04 RX ADMIN — METOPROLOL SUCCINATE 50 MG: 50 TABLET, EXTENDED RELEASE ORAL at 08:01

## 2025-01-04 RX ADMIN — VANCOMYCIN HYDROCHLORIDE 1250 MG: 1.25 INJECTION, POWDER, LYOPHILIZED, FOR SOLUTION INTRAVENOUS at 08:01

## 2025-01-04 NOTE — SUBJECTIVE & OBJECTIVE
Past Medical History:   Diagnosis Date    Alcohol abuse     Anasarca 1/28/2019    Anemia     Anticoagulant long-term use     Arthropathy associated with neurological disorder 9/2/2015    Atherosclerosis     Charcot foot due to diabetes mellitus     Chronic combined systolic and diastolic heart failure 01/29/2019 1-28-19 Left VentricleModerate decreased ejection fraction at 30%. Normal left ventricular cavity size. Normal wall thickness observed. Grade I (mild) left ventricular diastolic dysfunction consistent with impaired relaxation. Normal left atrial pressure. Moderate, global hypokinesis(see wall scoring diagram). Right VentricleNormal cavity size, wall thickness and ejection fraction. Wall motion n    Chronic pancreatitis 1/28/2019    CKD (chronic kidney disease) stage 3, GFR 30-59 ml/min     CKD (chronic kidney disease) stage 4, GFR 15-29 ml/min     Colon polyps     approx 5 yrs ago    Coronary artery disease due to calcified coronary lesion 05/08/2015    5 stents on ASA      Diabetic polyneuropathy associated with type 2 diabetes mellitus 9/2/2015    Diverticulosis 1/28/2019    DM type 2 with diabetic peripheral neuropathy 2/4/2019    Encounter for blood transfusion     Essential hypertension 1/28/2019    Former smoker 8/26/2015    Healed ulcer of left foot on examination 6/20/2017    Hematuria     Hydrocele     approx 1.5 yrs ago    Hyperphosphatemia     Hypoalbuminemia 2/4/2019    Hypocalcemia     Lymphedema of both lower extremities 1/29/2019    Mixed hyperlipidemia 5/8/2015    Morbid obesity with BMI of 50.0-59.9, adult 5/8/2015    Obstruction of right ureteropelvic junction (UPJ) due to stone 5/24/2021    Onychomycosis of multiple toenails with type 2 diabetes mellitus and peripheral neuropathy 6/20/2017    Perianal cyst     approx 2 yrs ago    Proteinuria     Pseudocyst of pancreas 1/28/2019 1-28-19 Liver has a cirrhotic morphology with no focal lesions.  Significant interval increase in ascites  when compared to prior exam which may account for patient's abdominal distension.  Hypodense air-fluid collection along the body of the pancreas which is slightly smaller when compared to prior CT.  Findings may relate to pancreatic necrosis with pancreatic pseudocysts with infected pseudocyst    Skin cancer     skin cancer    Sleep apnea 8/26/2015    Status post bariatric surgery 1/11/2016    Type 2 diabetes mellitus, with long-term current use of insulin 5/8/2015    Urinary tract infection        Past Surgical History:   Procedure Laterality Date    ANGIOGRAPHY N/A 6/28/2019    Procedure: ANGIOGRAM-PV STENT;  Surgeon: Ewa Diagnostic Provider;  Location: [facility] OR;  Service: Radiology;  Laterality: N/A;    ANGIOPLASTY      total x5 stents    AORTOGRAPHY WITH SERIALOGRAPHY N/A 11/26/2024    Procedure: AORTOGRAM, WITH SERIALOGRAPHY;  Surgeon: Clinton Gibbs MD;  Location: [facility] CATH LAB/EP;  Service: Cardiology;  Laterality: N/A;    COLONOSCOPY N/A 10/6/2015    Procedure: COLONOSCOPY;  Surgeon: Shekhar Richards MD;  Location: [facility] (2ND FLR);  Service: Endoscopy;  Laterality: N/A;  BMI over 55/2nd floor case    5 day hold Plavix, Dr Kwadwo Arroyo    COLONOSCOPY N/A 5/13/2021    Procedure: COLONOSCOPY;  Surgeon: Huan Brumfield MD;  Location: [facility];  Service: Endoscopy;  Laterality: N/A;    CORONARY ANGIOGRAPHY Right 3/20/2019    Procedure: ANGIOGRAM, CORONARY ARTERY;  Surgeon: Bob Duque MD;  Location: [facility] CATH LAB;  Service: Cardiology;  Laterality: Right;    CORONARY ARTERY BYPASS GRAFT  2017    x3    CORONARY BYPASS GRAFT ANGIOGRAPHY  3/20/2019    Procedure: Bypass graft study;  Surgeon: Bob Duque MD;  Location: [facility] CATH LAB;  Service: Cardiology;;    CYST REMOVAL      CYSTOSCOPY Right 6/30/2021    Procedure: CYSTOSCOPY;  Surgeon: William Diaz MD;  Location: [facility] 1ST FLR;  Service: Urology;  Laterality: Right;    CYSTOSCOPY N/A 10/16/2023    Procedure: CYSTOSCOPY;  Surgeon:  Chrystal Dias MD;  Location: [facility] OR 1ST FLR;  Service: Urology;  Laterality: N/A;    CYSTOSCOPY N/A 12/6/2023    Procedure: CYSTOSCOPY;  Surgeon: William Diaz MD;  Location: [facility] OR 1ST FLR;  Service: Urology;  Laterality: N/A;    CYSTOSCOPY W/ URETERAL STENT PLACEMENT Right 6/16/2021    Procedure: CYSTOSCOPY, WITH URETERAL STENT INSERTION;  Surgeon: William Diaz MD;  Location: [facility] OR 2ND FLR;  Service: Urology;  Laterality: Right;  FLUORO LESS THAN 1 HOUR    ENDOSCOPIC ULTRASOUND OF UPPER GASTROINTESTINAL TRACT N/A 2/26/2020    Procedure: ULTRASOUND, UPPER GI TRACT, ENDOSCOPIC;  Surgeon: Robbie Yang MD;  Location: [facility];  Service: Endoscopy;  Laterality: N/A;    ESOPHAGOGASTRODUODENOSCOPY N/A 7/8/2019    Procedure: ESOPHAGOGASTRODUODENOSCOPY (EGD);  Surgeon: Huan Brumfield MD;  Location: [facility];  Service: Endoscopy;  Laterality: N/A;    ESOPHAGOGASTRODUODENOSCOPY N/A 5/13/2021    Procedure: EGD (ESOPHAGOGASTRODUODENOSCOPY);  Surgeon: Huan Brumfield MD;  Location: [facility];  Service: Endoscopy;  Laterality: N/A;    EXCISION OF HYDROCELE Bilateral 6/16/2021    Procedure: HYDROCELE REPAIR;  Surgeon: William Diaz MD;  [facility];  Service: Urology;  Laterality: Bilateral;  2 HOURS    EXTRACTION - STONE Right 12/6/2023    Procedure: EXTRACTION - STONE;  Surgeon: William Diaz MD;  Location: [facility] OR 1ST FLR;  Service: Urology;  Laterality: Right;    FLUOROSCOPY N/A 6/16/2021    Procedure: FLUOROSCOPY;  Surgeon: William Diaz MD;  Location: [facility] OR 2ND FLR;  Service: Urology;  Laterality: N/A;    GASTRECTOMY      INSERTION OF DIALYSIS CATHETER N/A 5/17/2019    Procedure: pleurx;  Surgeon: Ewa Diagnostic Provider;  Location: [facility] OR 2ND FLR;  Service: General;  Laterality: N/A;  Room 188/[place]    KNEE ARTHROSCOPY      LAPAROSCOPIC CHOLECYSTECTOMY N/A 5/4/2020    Procedure: CHOLECYSTECTOMY, LAPAROSCOPIC;  Surgeon: SON Rowe MD;  Location: [facility];  Service:  General;  Laterality: N/A;    LASER LITHOTRIPSY N/A 6/30/2021    Procedure: LITHOTRIPSY, USING LASER;  Surgeon: William Diaz MD;  Location: [facility] OR 1ST FLR;  Service: Urology;  Laterality: N/A;    LIVER BIOPSY N/A 5/4/2020    Procedure: BIOPSY, LIVER, Laproscopic ;  Surgeon: SON Rowe MD;  Location: [facility];  Service: General;  Laterality: N/A;    perianal surgery      perianal cyst removed    PYELOSCOPY Right 6/30/2021    Procedure: PYELOSCOPY;  Surgeon: William Diaz MD;  Location: [facility] OR 1ST FLR;  Service: Urology;  Laterality: Right;    PYELOSCOPY Right 10/16/2023    Procedure: PYELOSCOPY;  Surgeon: Chrystal Dias MD;  Location: [facility] OR 1ST FLR;  Service: Urology;  Laterality: Right;    PYELOSCOPY Right 12/6/2023    Procedure: PYELOSCOPY;  Surgeon: William Diaz MD;  Location: [facility] OR 1ST FLR;  Service: Urology;  Laterality: Right;    REMOVAL-STENT Right 12/6/2023    Procedure: REMOVAL-STENT;  Surgeon: William Diaz MD;  Location: [facility] OR 1ST FLR;  Service: Urology;  Laterality: Right;    REPLACEMENT OF STENT Right 6/30/2021    Procedure: REPLACEMENT, STENT;  Surgeon: William Diaz MD;  Location: [facility] OR 1ST FLR;  Service: Urology;  Laterality: Right;    RETROGRADE PYELOGRAPHY Right 10/16/2023    Procedure: PYELOGRAM, RETROGRADE;  Surgeon: Chrystal Dias MD;  Location: [facility] OR 1ST FLR;  Service: Urology;  Laterality: Right;    RETROGRADE PYELOGRAPHY Right 12/6/2023    Procedure: PYELOGRAM, RETROGRADE;  Surgeon: William Diaz MD;  Location: [facility] OR 1ST FLR;  Service: Urology;  Laterality: Right;    URETERAL STENT PLACEMENT Right 10/16/2023    Procedure: INSERTION, STENT, URETER;  Surgeon: Chrystal Dias MD;  Location: [facility] OR 1ST FLR;  Service: Urology;  Laterality: Right;    URETEROSCOPY Right 6/30/2021    Procedure: URETEROSCOPY FLEXIBLE URETEROSCOPE;  Surgeon: William Diaz MD;  Location: [facility] OR [address] [place] [address];  Service: Urology;  Laterality: Right;    URETEROSCOPY  Right 10/16/2023    Procedure: URETEROSCOPY;  Surgeon: Chrystal Dias MD;  Location: [facility] OR [address] [place] [address];  Service: Urology;  Laterality: Right;    URETEROSCOPY Right 12/6/2023    Procedure: URETEROSCOPY;  Surgeon: William Diaz MD;  Location: [facility] OR [address] [place] [address];  Service: Urology;  Laterality: Right;       Review of patient's allergies indicates:  No Known Allergies    No current facility-administered medications on file prior to encounter.     Current Outpatient Medications on File Prior to Encounter   Medication Sig    isosorbide mononitrate (IMDUR) 30 MG 24 hr tablet Take 1 tablet (30 mg total) by mouth once daily.    acetaminophen (TYLENOL) 325 MG tablet Take 2 tablets (650 mg total) by mouth every 8 (eight) hours as needed for Pain.    aspirin (ECOTRIN) 81 MG EC tablet Take 1 tablet (81 mg total) by mouth once daily.    blood pressure monitor Kit 1 kit by Misc.(Non-Drug; Combo Route) route 2 (two) times a day.    blood-glucose sensor (DEXCOM G6 SENSOR) Sandi Inject 1 each into the skin every 10 days.    blood-glucose transmitter (DEXCOM G6 TRANSMITTER) Sandi Inject 1 each into the skin every 10 days.    clopidogreL (PLAVIX) 75 mg tablet Take 1 tablet (75 mg total) by mouth once daily.    glucagon (BAQSIMI) 3 mg/actuation Spry 1 each by Nasal route daily as needed (if glucose is less than 70 - can repeat in 1 hour if still low/less than 70).    HUMALOG KWIKPEN INSULIN 100 unit/mL pen Inject 5 Units into the skin before meals as needed (before each meal - if OFF of insulin pump). This is emergency back up insulin to use with meals if OFF of insulin pump    insulin detemir U-100, Levemir, 100 unit/mL (3 mL) SubQ InPn pen Inject 30 Units into the skin every evening. This is emergency back up insulin only if OFF of your insulin pump    insulin lispro-aabc (LYUMJEV U-100 INSULIN) 100 unit/mL Inject 80 Units into the skin continuous. Uses up to 80 units daily with omnipod 5 insulin pump as directed.    insulin pump  "cart,automated,BT (OMNIPOD 5 G6 PODS, GEN 5,) Crtg INJECT 1 EACH INTO THE SKIN EVERY OTHER DAY.    rosuvastatin (CRESTOR) 20 MG tablet Take 20 mg by mouth once daily.    torsemide (DEMADEX) 20 MG Tab Take 1 tablet (20 mg total) by mouth once daily.    [DISCONTINUED] blood sugar diagnostic (ONETOUCH VERIO TEST STRIPS) Strp 1 each by Misc.(Non-Drug; Combo Route) route 2 (two) times a day.    [DISCONTINUED] lancets (ONETOUCH DELICA PLUS LANCET) 33 gauge Misc 1 lancet  by Misc.(Non-Drug; Combo Route) route 2 (two) times daily.    [DISCONTINUED] pen needle, diabetic 32 gauge x " Ndle Use with toujeo insulin one daily only as Emergency use/back up insulin - if OFF OF your insulin pump.     Family History       Problem Relation (Age of Onset)    Cancer Mother, Father, Paternal Grandfather, Brother    Diabetes Maternal Grandmother    Heart disease Father    No Known Problems Paternal Grandmother    Obesity Sister    Parkinsonism Brother    Stroke Maternal Grandfather          Tobacco Use    Smoking status: Former     Current packs/day: 0.00     Average packs/day: 2.0 packs/day for 30.0 years (60.0 ttl pk-yrs)     Types: Cigarettes     Start date: 1975     Quit date: 2005     Years since quittin.9    Smokeless tobacco: Never   Substance and Sexual Activity    Alcohol use: No     Comment: started ~, reports 1 shot daily, max 3 shots daily, vague about alcohol consumption. Last drink 2018    Drug use: No    Sexual activity: Not on file     Review of Systems   Cardiovascular:  Positive for leg swelling.   Musculoskeletal:  Positive for arthralgias (LLE).   All other systems reviewed and are negative.    Objective:     Vital Signs (Most Recent):  Temp: 97.5 °F (36.4 °C) (25)  Pulse: 76 (25)  Resp: 18 (25)  BP: 108/62 (25)  SpO2: 98 % (25) Vital Signs (24h Range):  Temp:  [97.5 °F (36.4 °C)-98.4 °F (36.9 °C)] 97.5 °F (36.4 °C)  Pulse:  [70-77] 76  Resp: "  [9-22] 18  SpO2:  [96 %-100 %] 98 %  BP: (105-131)/(53-73) 108/62     Weight: 108.4 kg (238 lb 15.7 oz)  Body mass index is 37.43 kg/m².     Physical Exam  Constitutional:       Appearance: Normal appearance.   HENT:      Head: Normocephalic.      Mouth/Throat:      Mouth: Mucous membranes are moist.      Pharynx: Oropharynx is clear.   Eyes:      Pupils: Pupils are equal, round, and reactive to light.   Cardiovascular:      Rate and Rhythm: Normal rate and regular rhythm.      Pulses: Normal pulses.      Heart sounds: Normal heart sounds.   Pulmonary:      Effort: Pulmonary effort is normal.      Breath sounds: Normal breath sounds.   Abdominal:      General: Bowel sounds are normal.      Palpations: Abdomen is soft.   Musculoskeletal:         General: Normal range of motion.      Cervical back: Normal range of motion.   Skin:     General: Skin is warm and dry.      Capillary Refill: Capillary refill takes less than 2 seconds.   Neurological:      General: No focal deficit present.      Mental Status: He is alert and oriented to person, place, and time. Mental status is at baseline.   Psychiatric:         Mood and Affect: Mood normal.         Behavior: Behavior normal.              CRANIAL NERVES     CN III, IV, VI   Pupils are equal, round, and reactive to light.       Significant Labs: All pertinent labs within the past 24 hours have been reviewed.  Recent Lab Results  (Last 5 results in the past 24 hours)        01/03/25  2118   01/03/25  2041   01/03/25  1640   01/03/25  1333   01/03/25  1214        POC Glucose 204               POCT Glucose   249   226   342   416                              Significant Imaging: I have reviewed all pertinent imaging results/findings within the past 24 hours.  I have reviewed and interpreted all pertinent imaging results/findings within the past 24 hours.

## 2025-01-04 NOTE — HPI
69-year-old male with a history of CKD 4, IDDM, and PAF who presented as direct admit from Podiatry. Admitted for left 2nd metatarsal amputation for gangrenous toe. Patient was evaluated at bedside postoperatively denies any pain. Patient has insulin pump which apparently came dislodged last night which is his reading for uncontrolled glucose. On vanco and zosyn

## 2025-01-04 NOTE — PLAN OF CARE
Problem: Adult Inpatient Plan of Care  Goal: Plan of Care Review  Outcome: Not Progressing     Problem: Adult Inpatient Plan of Care  Goal: Optimal Comfort and Wellbeing  Outcome: Not Progressing     Problem: Diabetes Comorbidity  Goal: Blood Glucose Level Within Targeted Range  Outcome: Not Progressing     Problem: Bariatric Environmental Safety  Goal: Safety Maintained with Care  Outcome: Not Progressing     Problem: Wound  Goal: Optimal Coping  Outcome: Not Progressing     Problem: Wound  Goal: Optimal Functional Ability  Outcome: Not Progressing     Problem: Wound  Goal: Optimal Pain Control and Function  Outcome: Not Progressing     Problem: Extremity Amputation  Goal: Effective Urinary Elimination  Outcome: Not Progressing     Problem: Extremity Amputation  Goal: Optimal Residual Limb Healing  Outcome: Not Progressing    Blood glucose monitoring.  Insulin pump @ LLQ of abdomen.  Meds administered per MAR.  Pain meds administered PRN.  Wound care provided by Podiatry.  Weight bearing to left foot.  Darco shoe at bedside.   Bedside commode placed in room.  Safety maintained.  Bed alarm on.  Call light within reach.

## 2025-01-04 NOTE — ASSESSMENT & PLAN NOTE
Patient's FSGs are uncontrolled due to hyperglycemia on current medication regimen.  Last A1c reviewed-   Lab Results   Component Value Date    HGBA1C 9.7 (H) 01/04/2025     Most recent fingerstick glucose reviewed-   Recent Labs   Lab 01/03/25  1333 01/03/25  1640 01/03/25  2041 01/04/25  0347   POCTGLUCOSE 342* 226* 249* 204*     Current correctional scale   home dosing insulin pump.  Currently at 1.6 units per hour with bolus dosing per pump.  Maintain anti-hyperglycemic dose as follows- continue home dose insulin pump.  If uncontrolled would DC his insulin pump and start basal and bolus dosing  Antihyperglycemics (From admission, onward)      Start     Stop Route Frequency Ordered    01/03/25 1930  insulin regular insulin pump from home        Question Answer Comment   Target number 150    Basal Rate #1 5    Basal rate #1 time 1730        -- SubQ Continuous 01/03/25 1829          Hold Oral hypoglycemics while patient is in the hospital.

## 2025-01-04 NOTE — ASSESSMENT & PLAN NOTE
Creatine stable for now. BMP reviewed- noted Estimated Creatinine Clearance: 21.8 mL/min (A) (based on SCr of 3.7 mg/dL (H)). according to latest data. Based on current GFR, CKD stage is stage 4 - GFR 15-29.  Monitor UOP and serial BMP and adjust therapy as needed. Renally dose meds. Avoid nephrotoxic medications and procedures.

## 2025-01-04 NOTE — PROGRESS NOTES
[facility] - Med Surg  Infectious Disease  Progress Note    Patient Name: Jian Arrieta  MRN: [id number]  Admission Date: 1/3/2025  Length of Stay: 1 days  Attending Physician: Mohinder Jimenes,*  Primary Care Provider: Reza Boyer MD    Isolation Status: No active isolations  Assessment/Plan:      Orthopedic  * Gangrene  69-year-old male with a history of CKD 4, IDDM, and PAD who is admitted for eft 2nd metatarsal amputation fro gangrenous toe    -continue zosyn and vanco  -await pending cultures  -would plan for 5 days of therapy from amputation unless path shows margins involved             Thank you for your consult. I will follow-up with patient. Please contact us if you have any additional questions.    Chris Schumacher MD  Infectious Disease  [facility] - Med Surg    Subjective:     Principal Problem:Gangrene    HPI: 69-year-old male with a history of CKD 4, IDDM, and PAF who presented as direct admit from Podiatry. Admitted for left 2nd metatarsal amputation for gangrenous toe. Patient was evaluated at bedside postoperatively denies any pain. Patient has insulin pump which apparently came dislodged last night which is his reading for uncontrolled glucose. On vanco and zosyn  Past Medical History:   Diagnosis Date    Alcohol abuse     Anasarca 1/28/2019    Anemia     Anticoagulant long-term use     Arthropathy associated with neurological disorder 9/2/2015    Atherosclerosis     Charcot foot due to diabetes mellitus     Chronic combined systolic and diastolic heart failure 01/29/2019 1-28-19 Left VentricleModerate decreased ejection fraction at 30%. Normal left ventricular cavity size. Normal wall thickness observed. Grade I (mild) left ventricular diastolic dysfunction consistent with impaired relaxation. Normal left atrial pressure. Moderate, global hypokinesis(see wall scoring diagram). Right VentricleNormal cavity size, wall thickness and ejection fraction. Wall motion n    Chronic pancreatitis  1/28/2019    CKD (chronic kidney disease) stage 3, GFR 30-59 ml/min     CKD (chronic kidney disease) stage 4, GFR 15-29 ml/min     Colon polyps     approx 5 yrs ago    Coronary artery disease due to calcified coronary lesion 05/08/2015    5 stents on ASA      Diabetic polyneuropathy associated with type 2 diabetes mellitus 9/2/2015    Diverticulosis 1/28/2019    DM type 2 with diabetic peripheral neuropathy 2/4/2019    Encounter for blood transfusion     Essential hypertension 1/28/2019    Former smoker 8/26/2015    Healed ulcer of left foot on examination 6/20/2017    Hematuria     Hydrocele     approx 1.5 yrs ago    Hyperphosphatemia     Hypoalbuminemia 2/4/2019    Hypocalcemia     Lymphedema of both lower extremities 1/29/2019    Mixed hyperlipidemia 5/8/2015    Morbid obesity with BMI of 50.0-59.9, adult 5/8/2015    Obstruction of right ureteropelvic junction (UPJ) due to stone 5/24/2021    Onychomycosis of multiple toenails with type 2 diabetes mellitus and peripheral neuropathy 6/20/2017    Perianal cyst     approx 2 yrs ago    Proteinuria     Pseudocyst of pancreas 1/28/2019 1-28-19 Liver has a cirrhotic morphology with no focal lesions.  Significant interval increase in ascites when compared to prior exam which may account for patient's abdominal distension.  Hypodense air-fluid collection along the body of the pancreas which is slightly smaller when compared to prior CT.  Findings may relate to pancreatic necrosis with pancreatic pseudocysts with infected pseudocyst    Skin cancer     skin cancer    Sleep apnea 8/26/2015    Status post bariatric surgery 1/11/2016    Type 2 diabetes mellitus, with long-term current use of insulin 5/8/2015    Urinary tract infection        Past Surgical History:   Procedure Laterality Date    ANGIOGRAPHY N/A 6/28/2019    Procedure: ANGIOGRAM-PV STENT;  Surgeon: Ewa Diagnostic Provider;  Location: [facility] OR;  Service: Radiology;  Laterality: N/A;    ANGIOPLASTY      total x5  stents    AORTOGRAPHY WITH SERIALOGRAPHY N/A 11/26/2024    Procedure: AORTOGRAM, WITH SERIALOGRAPHY;  Surgeon: Clinton Gibbs MD;  Location: [facility] CATH LAB/EP;  Service: Cardiology;  Laterality: N/A;    COLONOSCOPY N/A 10/6/2015    Procedure: COLONOSCOPY;  Surgeon: Shekhar Richards MD;  Location: [facility] ENDO (2ND FLR);  Service: Endoscopy;  Laterality: N/A;  BMI over 55/2nd floor case    5 day hold Plavix, Dr Kwadwo Arroyo    COLONOSCOPY N/A 5/13/2021    Procedure: COLONOSCOPY;  Surgeon: Huan Brumfield MD;  Location: [facility];  Service: Endoscopy;  Laterality: N/A;    CORONARY ANGIOGRAPHY Right 3/20/2019    Procedure: ANGIOGRAM, CORONARY ARTERY;  Surgeon: Bob Duque MD;  Location: [facility] CATH LAB;  Service: Cardiology;  Laterality: Right;    CORONARY ARTERY BYPASS GRAFT  2017    x3    CORONARY BYPASS GRAFT ANGIOGRAPHY  3/20/2019    Procedure: Bypass graft study;  Surgeon: Bob Duque MD;  Location: [facility] CATH LAB;  Service: Cardiology;;    CYST REMOVAL      CYSTOSCOPY Right 6/30/2021    Procedure: CYSTOSCOPY;  Surgeon: William Diaz MD;  Location: [facility] OR 1ST FLR;  Service: Urology;  Laterality: Right;    CYSTOSCOPY N/A 10/16/2023    Procedure: CYSTOSCOPY;  Surgeon: Chrystal Dias MD;  Location: [facility] OR 1ST FLR;  Service: Urology;  Laterality: N/A;    CYSTOSCOPY N/A 12/6/2023    Procedure: CYSTOSCOPY;  Surgeon: William Diaz MD;  Location: [facility] OR 1ST FLR;  Service: Urology;  Laterality: N/A;    CYSTOSCOPY W/ URETERAL STENT PLACEMENT Right 6/16/2021    Procedure: CYSTOSCOPY, WITH URETERAL STENT INSERTION;  Surgeon: William Diaz MD;  Location: [facility] OR 2ND FLR;  Service: Urology;  Laterality: Right;  FLUORO LESS THAN 1 HOUR    ENDOSCOPIC ULTRASOUND OF UPPER GASTROINTESTINAL TRACT N/A 2/26/2020    Procedure: ULTRASOUND, UPPER GI TRACT, ENDOSCOPIC;  Surgeon: Robbie Yang MD;  Location: [facility] ENDO;  Service: Endoscopy;  Laterality: N/A;    ESOPHAGOGASTRODUODENOSCOPY N/A 7/8/2019    Procedure:  ESOPHAGOGASTRODUODENOSCOPY (EGD);  Surgeon: Huan Brumfield MD;  Location: [facility] ENDO;  Service: Endoscopy;  Laterality: N/A;    ESOPHAGOGASTRODUODENOSCOPY N/A 5/13/2021    Procedure: EGD (ESOPHAGOGASTRODUODENOSCOPY);  Surgeon: Huan Brumfield MD;  Location: [facility] ENDO;  Service: Endoscopy;  Laterality: N/A;    EXCISION OF HYDROCELE Bilateral 6/16/2021    Procedure: HYDROCELE REPAIR;  Surgeon: William Diaz MD;  Location: [facility] OR 2ND FLR;  Service: Urology;  Laterality: Bilateral;  2 HOURS    EXTRACTION - STONE Right 12/6/2023    Procedure: EXTRACTION - STONE;  Surgeon: William Diaz MD;  Location: [facility] OR 1ST FLR;  Service: Urology;  Laterality: Right;    FLUOROSCOPY N/A 6/16/2021    Procedure: FLUOROSCOPY;  Surgeon: William Diaz MD;  Location: [facility] OR 2ND FLR;  Service: Urology;  Laterality: N/A;    GASTRECTOMY      INSERTION OF DIALYSIS CATHETER N/A 5/17/2019    Procedure: pleurx;  Surgeon: [facility] Diagnostic Provider;  Location: [facility] OR 2ND FLR;  Service: General;  Laterality: N/A;  Room 188/[facility]    KNEE ARTHROSCOPY      LAPAROSCOPIC CHOLECYSTECTOMY N/A 5/4/2020    Procedure: CHOLECYSTECTOMY, LAPAROSCOPIC;  Surgeon: SON Rowe MD;  Location: [facility] OR;  Service: General;  Laterality: N/A;    LASER LITHOTRIPSY N/A 6/30/2021    Procedure: LITHOTRIPSY, USING LASER;  Surgeon: William Diza MD;  Location: [facility] OR 1ST FLR;  Service: Urology;  Laterality: N/A;    LIVER BIOPSY N/A 5/4/2020    Procedure: BIOPSY, LIVER, Laproscopic ;  Surgeon: SON Rowe MD;  Location: [facility] OR;  Service: General;  Laterality: N/A;    perianal surgery      perianal cyst removed    PYELOSCOPY Right 6/30/2021    Procedure: PYELOSCOPY;  Surgeon: William Diaz MD;  Location: [facility] OR 1ST FLR;  Service: Urology;  Laterality: Right;    PYELOSCOPY Right 10/16/2023    Procedure: PYELOSCOPY;  Surgeon: Chrystal Dias MD;  Location: [facility] OR [address] [place] [address];  Service: Urology;  Laterality: Right;    PYELOSCOPY Right 12/6/2023     Procedure: PYELOSCOPY;  Surgeon: William Diaz MD;  Location: [facility] OR [facility] FLR;  Service: Urology;  Laterality: Right;    REMOVAL-STENT Right 12/6/2023    Procedure: REMOVAL-STENT;  Surgeon: William Diaz MD;  Location: [facility] OR 1ST FLR;  Service: Urology;  Laterality: Right;    REPLACEMENT OF STENT Right 6/30/2021    Procedure: REPLACEMENT, STENT;  Surgeon: William Diaz MD;  Location: [facility] OR [facility] FLR;  Service: Urology;  Laterality: Right;    RETROGRADE PYELOGRAPHY Right 10/16/2023    Procedure: PYELOGRAM, RETROGRADE;  Surgeon: Chrystal Dias MD;  [facility];  Service: Urology;  Laterality: Right;    RETROGRADE PYELOGRAPHY Right 12/6/2023    Procedure: PYELOGRAM, RETROGRADE;  Surgeon: William Diaz MD;  [facility];  Service: Urology;  Laterality: Right;    URETERAL STENT PLACEMENT Right 10/16/2023    Procedure: INSERTION, STENT, URETER;  Surgeon: Chrystal Dias MD;  [facility];  Service: Urology;  Laterality: Right;    URETEROSCOPY Right 6/30/2021    Procedure: URETEROSCOPY FLEXIBLE URETEROSCOPE;  Surgeon: William Diaz MD;  [facility];  Service: Urology;  Laterality: Right;    URETEROSCOPY Right 10/16/2023    Procedure: URETEROSCOPY;  Surgeon: Chrystal Dias MD;  [facility];  Service: Urology;  Laterality: Right;    URETEROSCOPY Right 12/6/2023    Procedure: URETEROSCOPY;  Surgeon: William Diaz MD;  [facility];  Service: Urology;  Laterality: Right;       Review of patient's allergies indicates:  No Known Allergies    Medications:  Medications Prior to Admission   Medication Sig    isosorbide mononitrate (IMDUR) 30 MG 24 hr tablet Take 1 tablet (30 mg total) by mouth once daily.    acetaminophen (TYLENOL) 325 MG tablet Take 2 tablets (650 mg total) by mouth every 8 (eight) hours as needed for Pain.    aspirin (ECOTRIN) 81 MG EC tablet Take 1 tablet (81 mg total) by mouth once daily.     "blood pressure monitor Kit 1 kit by Misc.(Non-Drug; Combo Route) route 2 (two) times a day.    blood-glucose sensor (DEXCOM G6 SENSOR) Sandi Inject 1 each into the skin every 10 days.    blood-glucose transmitter (DEXCOM G6 TRANSMITTER) Sandi Inject 1 each into the skin every 10 days.    clopidogreL (PLAVIX) 75 mg tablet Take 1 tablet (75 mg total) by mouth once daily.    glucagon (BAQSIMI) 3 mg/actuation Spry 1 each by Nasal route daily as needed (if glucose is less than 70 - can repeat in 1 hour if still low/less than 70).    HUMALOG KWIKPEN INSULIN 100 unit/mL pen Inject 5 Units into the skin before meals as needed (before each meal - if OFF of insulin pump). This is emergency back up insulin to use with meals if OFF of insulin pump    insulin detemir U-100, Levemir, 100 unit/mL (3 mL) SubQ InPn pen Inject 30 Units into the skin every evening. This is emergency back up insulin only if OFF of your insulin pump    insulin lispro-aabc (LYUMJEV U-100 INSULIN) 100 unit/mL Inject 80 Units into the skin continuous. Uses up to 80 units daily with omnipod 5 insulin pump as directed.    insulin pump cart,automated,BT (OMNIPOD 5 G6 PODS, GEN 5,) Crtg INJECT 1 EACH INTO THE SKIN EVERY OTHER DAY.    rosuvastatin (CRESTOR) 20 MG tablet Take 20 mg by mouth once daily.    torsemide (DEMADEX) 20 MG Tab Take 1 tablet (20 mg total) by mouth once daily.    [DISCONTINUED] blood sugar diagnostic (ONETOUCH VERIO TEST STRIPS) Strp 1 each by Misc.(Non-Drug; Combo Route) route 2 (two) times a day.    [DISCONTINUED] lancets (ONETOUCH DELICA PLUS LANCET) 33 gauge Misc 1 lancet  by Misc.(Non-Drug; Combo Route) route 2 (two) times daily.    [DISCONTINUED] pen needle, diabetic 32 gauge x 5/32" Ndle Use with toujeo insulin one daily only as Emergency use/back up insulin - if OFF OF your insulin pump.     Antibiotics (From admission, onward)      Start     Stop Route Frequency Ordered    01/03/25 2100  piperacillin-tazobactam (ZOSYN) 4.5 g in D5W " "100 mL IVPB (MB+)         -- IV Every 12 hours (non-standard times) 25 17525 185  vancomycin - pharmacy to dose  (vancomycin IVPB (PEDS and ADULTS))        Placed in "And" Linked Group    -- IV pharmacy to manage frequency 25          Antifungals (From admission, onward)      None          Antivirals (From admission, onward)      None             Immunization History   Administered Date(s) Administered    COVID-19, MRNA, LN-S, PF (MODERNA FULL 0.5 ML DOSE) 2021, 2021, 2021    Influenza 10/31/2011, 2014    Influenza (FLUAD) - Quadrivalent - Adjuvanted - PF *Preferred* (65+) 2020, 10/27/2023    Influenza - Quadrivalent - PF *Preferred* (6 months and older) 10/31/2011, 2014    Influenza - Trivalent - Fluad - Adjuvanted - PF (65 years and older 2024    Influenza - Trivalent - Fluzone High Dose - PF (65 years and older) 11/15/2019    Influenza A (H1N1) 2009 Monovalent - IM 2009    PPD Test 2019    Pneumococcal Polysaccharide - 23 Valent 10/26/2020    Tdap 2017       Family History       Problem Relation (Age of Onset)    Cancer Mother, Father, Paternal Grandfather, Brother    Diabetes Maternal Grandmother    Heart disease Father    No Known Problems Paternal Grandmother    Obesity Sister    Parkinsonism Brother    Stroke Maternal Grandfather          Social History     Socioeconomic History    Marital status:    Tobacco Use    Smoking status: Former     Current packs/day: 0.00     Average packs/day: 2.0 packs/day for 30.0 years (60.0 ttl pk-yrs)     Types: Cigarettes     Start date: 1975     Quit date: 2005     Years since quittin.9    Smokeless tobacco: Never   Substance and Sexual Activity    Alcohol use: No     Comment: started ~, reports 1 shot daily, max 3 shots daily, vague about alcohol consumption. Last drink 2018    Drug use: No     Social Drivers of Health     Financial Resource Strain: Patient " Declined (1/3/2025)    Overall Financial Resource Strain (CARDIA)     Difficulty of Paying Living Expenses: Patient declined   Food Insecurity: Patient Declined (1/3/2025)    Hunger Vital Sign     Worried About Running Out of Food in the Last Year: Patient declined     Ran Out of Food in the Last Year: Patient declined   Transportation Needs: Patient Declined (1/3/2025)    TRANSPORTATION NEEDS     Transportation : Patient declined   Physical Activity: Patient Declined (11/27/2024)    Exercise Vital Sign     Days of Exercise per Week: Patient declined     Minutes of Exercise per Session: Patient declined   Stress: Patient Declined (1/3/2025)    [employer] - Occupational Stress Questionnaire     Feeling of Stress : Patient declined   Housing Stability: Patient Declined (1/3/2025)    Housing Stability Vital Sign     Unable to Pay for Housing in the Last Year: Patient declined     Homeless in the Last Year: Patient declined     Review of Systems   Cardiovascular:  Positive for leg swelling.   Musculoskeletal:  Positive for arthralgias (LLE).   All other systems reviewed and are negative.    Objective:     Vital Signs (Most Recent):  Temp: 98.4 °F (36.9 °C) (01/04/25 1111)  Pulse: 80 (01/04/25 1111)  Resp: 17 (01/04/25 0716)  BP: 120/71 (01/04/25 1111)  SpO2: 96 % (01/04/25 1111) Vital Signs (24h Range):  Temp:  [97.5 °F (36.4 °C)-98.4 °F (36.9 °C)] 98.4 °F (36.9 °C)  Pulse:  [70-80] 80  Resp:  [9-22] 17  SpO2:  [96 %-100 %] 96 %  BP: (105-131)/(53-73) 120/71     Weight: 108.4 kg (238 lb 15.7 oz)  Body mass index is 37.43 kg/m².    Estimated Creatinine Clearance: 21.8 mL/min (A) (based on SCr of 3.7 mg/dL (H)).     Physical Exam  Constitutional:       Appearance: Normal appearance.   HENT:      Head: Normocephalic.      Mouth/Throat:      Mouth: Mucous membranes are moist.      Pharynx: Oropharynx is clear.   Eyes:      Pupils: Pupils are equal, round, and reactive to light.    Cardiovascular:      Rate and Rhythm: Normal rate and regular rhythm.      Pulses: Normal pulses.      Heart sounds: Normal heart sounds.   Pulmonary:      Effort: Pulmonary effort is normal.      Breath sounds: Normal breath sounds.   Abdominal:      General: Bowel sounds are normal.      Palpations: Abdomen is soft.   Musculoskeletal:         General: Normal range of motion.      Cervical back: Normal range of motion.   Skin:     General: Skin is warm and dry.      Capillary Refill: Capillary refill takes less than 2 seconds.   Neurological:      General: No focal deficit present.      Mental Status: He is alert and oriented to person, place, and time. Mental status is at baseline.   Psychiatric:         Mood and Affect: Mood normal.         Behavior: Behavior normal.          Significant Labs: All pertinent labs within the past 24 hours have been reviewed.    Significant Imaging: I have reviewed all pertinent imaging results/findings within the past 24 hours.

## 2025-01-04 NOTE — ASSESSMENT & PLAN NOTE
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  Seen at Podiatry this morning and sent to hospital for OR    Now status post amputation., concern for mild area at base of medial great metatarsal.  Consult ID and Podiatry  Continue vancomycin and Zosyn renal dose  Pain control

## 2025-01-04 NOTE — SUBJECTIVE & OBJECTIVE
Scheduled Meds:   [START ON 1/4/2025] aspirin  81 mg Oral Daily    atorvastatin  80 mg Oral QHS    [START ON 1/4/2025] clopidogreL  75 mg Oral Daily    [START ON 1/4/2025] isosorbide mononitrate  30 mg Oral Daily    mirtazapine  7.5 mg Oral QHS    piperacillin-tazobactam (Zosyn) IV (PEDS and ADULTS) (extended infusion is not appropriate)  4.5 g Intravenous Q12H    [START ON 1/4/2025] torsemide  20 mg Oral Daily    vancomycin (VANCOCIN) IV (PEDS and ADULTS)  2,000 mg Intravenous Once     Continuous Infusions:   insulin regular  1.6 Units/hr Subcutaneous Continuous         PRN Meds:  Current Facility-Administered Medications:     dextrose 50%, 12.5 g, Intravenous, PRN    dextrose 50%, 12.5 g, Intravenous, PRN    dextrose 50%, 25 g, Intravenous, PRN    dextrose 50%, 25 g, Intravenous, PRN    glucagon (human recombinant), 1 mg, Intramuscular, PRN    glucagon (human recombinant), 1 mg, Intramuscular, PRN    glucose, 16 g, Oral, PRN    glucose, 16 g, Oral, PRN    glucose, 24 g, Oral, PRN    glucose, 24 g, Oral, PRN    naloxone, 0.02 mg, Intravenous, PRN    ondansetron, 4 mg, Intravenous, Q8H PRN    oxyCODONE-acetaminophen, 1 tablet, Oral, Q6H PRN    sodium chloride 0.9%, 10 mL, Intravenous, Q12H PRN    Pharmacy to dose Vancomycin consult, , , Once **AND** vancomycin - pharmacy to dose, , Intravenous, pharmacy to manage frequency    Review of patient's allergies indicates:  No Known Allergies     Past Medical History:   Diagnosis Date    Alcohol abuse     Anasarca 1/28/2019    Anemia     Anticoagulant long-term use     Arthropathy associated with neurological disorder 9/2/2015    Atherosclerosis     Charcot foot due to diabetes mellitus     Chronic combined systolic and diastolic heart failure 01/29/2019 1-28-19 Left VentricleModerate decreased ejection fraction at 30%. Normal left ventricular cavity size. Normal wall thickness observed. Grade I (mild) left ventricular diastolic dysfunction consistent with impaired  relaxation. Normal left atrial pressure. Moderate, global hypokinesis(see wall scoring diagram). Right VentricleNormal cavity size, wall thickness and ejection fraction. Wall motion n    Chronic pancreatitis 1/28/2019    CKD (chronic kidney disease) stage 3, GFR 30-59 ml/min     CKD (chronic kidney disease) stage 4, GFR 15-29 ml/min     Colon polyps     approx 5 yrs ago    Coronary artery disease due to calcified coronary lesion 05/08/2015    5 stents on ASA      Diabetic polyneuropathy associated with type 2 diabetes mellitus 9/2/2015    Diverticulosis 1/28/2019    DM type 2 with diabetic peripheral neuropathy 2/4/2019    Encounter for blood transfusion     Essential hypertension 1/28/2019    Former smoker 8/26/2015    Healed ulcer of left foot on examination 6/20/2017    Hematuria     Hydrocele     approx 1.5 yrs ago    Hyperphosphatemia     Hypoalbuminemia 2/4/2019    Hypocalcemia     Lymphedema of both lower extremities 1/29/2019    Mixed hyperlipidemia 5/8/2015    Morbid obesity with BMI of 50.0-59.9, adult 5/8/2015    Obstruction of right ureteropelvic junction (UPJ) due to stone 5/24/2021    Onychomycosis of multiple toenails with type 2 diabetes mellitus and peripheral neuropathy 6/20/2017    Perianal cyst     approx 2 yrs ago    Proteinuria     Pseudocyst of pancreas 1/28/2019 1-28-19 Liver has a cirrhotic morphology with no focal lesions.  Significant interval increase in ascites when compared to prior exam which may account for patient's abdominal distension.  Hypodense air-fluid collection along the body of the pancreas which is slightly smaller when compared to prior CT.  Findings may relate to pancreatic necrosis with pancreatic pseudocysts with infected pseudocyst    Skin cancer     skin cancer    Sleep apnea 8/26/2015    Status post bariatric surgery 1/11/2016    Type 2 diabetes mellitus, with long-term current use of insulin 5/8/2015    Urinary tract infection      Past Surgical History:    Procedure Laterality Date    ANGIOGRAPHY N/A 6/28/2019    Procedure: ANGIOGRAM-PV STENT;  Surgeon: Ewa Diagnostic Provider;  Location: [facility] OR;  Service: Radiology;  Laterality: N/A;    ANGIOPLASTY      total x5 stents    AORTOGRAPHY WITH SERIALOGRAPHY N/A 11/26/2024    Procedure: AORTOGRAM, WITH SERIALOGRAPHY;  Surgeon: Clinton Gibbs MD;  Location: [facility] CATH LAB/EP;  Service: Cardiology;  Laterality: N/A;    COLONOSCOPY N/A 10/6/2015    Procedure: COLONOSCOPY;  Surgeon: Shekhar Richards MD;  Location: [facility] ENDO (2ND FLR);  Service: Endoscopy;  Laterality: N/A;  BMI over 55/2nd floor case    5 day hold Plavix, Dr Kwadwo Arroyo    COLONOSCOPY N/A 5/13/2021    Procedure: COLONOSCOPY;  Surgeon: Huan Brumfield MD;  Location: [facility] ENDO;  Service: Endoscopy;  Laterality: N/A;    CORONARY ANGIOGRAPHY Right 3/20/2019    Procedure: ANGIOGRAM, CORONARY ARTERY;  Surgeon: Bob Duque MD;  Location: [facility] CATH LAB;  Service: Cardiology;  Laterality: Right;    CORONARY ARTERY BYPASS GRAFT  2017    x3    CORONARY BYPASS GRAFT ANGIOGRAPHY  3/20/2019    Procedure: Bypass graft study;  Surgeon: Bob Duque MD;  Location: [facility] CATH LAB;  Service: Cardiology;;    CYST REMOVAL      CYSTOSCOPY Right 6/30/2021    Procedure: CYSTOSCOPY;  Surgeon: William Diaz MD;  Location: [facility] 1ST FLR;  Service: Urology;  Laterality: Right;    CYSTOSCOPY N/A 10/16/2023    Procedure: CYSTOSCOPY;  Surgeon: Chrystal Dias MD;  Location: [facility] 1ST FLR;  Service: Urology;  Laterality: N/A;    CYSTOSCOPY N/A 12/6/2023    Procedure: CYSTOSCOPY;  Surgeon: William Diaz MD;  Location: [facility] OR 1ST FLR;  Service: Urology;  Laterality: N/A;    CYSTOSCOPY W/ URETERAL STENT PLACEMENT Right 6/16/2021    Procedure: CYSTOSCOPY, WITH URETERAL STENT INSERTION;  Surgeon: William Diaz MD;  Location: [facility] OR 2ND FLR;  Service: Urology;  Laterality: Right;  FLUORO LESS THAN 1 HOUR    ENDOSCOPIC ULTRASOUND OF UPPER GASTROINTESTINAL TRACT  N/A 2/26/2020    Procedure: ULTRASOUND, UPPER GI TRACT, ENDOSCOPIC;  Surgeon: Robbie Yang MD;  Location: [facility] ENDO;  Service: Endoscopy;  Laterality: N/A;    ESOPHAGOGASTRODUODENOSCOPY N/A 7/8/2019    Procedure: ESOPHAGOGASTRODUODENOSCOPY (EGD);  Surgeon: Huan Brumfield MD;  Location: [facility] ENDO;  Service: Endoscopy;  Laterality: N/A;    ESOPHAGOGASTRODUODENOSCOPY N/A 5/13/2021    Procedure: EGD (ESOPHAGOGASTRODUODENOSCOPY);  Surgeon: Huan Brumfield MD;  Location: [facility] ENDO;  Service: Endoscopy;  Laterality: N/A;    EXCISION OF HYDROCELE Bilateral 6/16/2021    Procedure: HYDROCELE REPAIR;  Surgeon: William Diaz MD;  Location: [facility] OR 2ND FLR;  Service: Urology;  Laterality: Bilateral;  2 HOURS    EXTRACTION - STONE Right 12/6/2023    Procedure: EXTRACTION - STONE;  Surgeon: William Diaz MD;  Location: [facility] OR 1ST FLR;  Service: Urology;  Laterality: Right;    FLUOROSCOPY N/A 6/16/2021    Procedure: FLUOROSCOPY;  Surgeon: William Diaz MD;  Location: [facility] OR 2ND FLR;  Service: Urology;  Laterality: N/A;    GASTRECTOMY      INSERTION OF DIALYSIS CATHETER N/A 5/17/2019    Procedure: pleurx;  Surgeon: [facility] Diagnostic Provider;  Location: [facility] OR 2ND FLR;  Service: General;  Laterality: N/A;  Room 188/[facility]    KNEE ARTHROSCOPY      LAPAROSCOPIC CHOLECYSTECTOMY N/A 5/4/2020    Procedure: CHOLECYSTECTOMY, LAPAROSCOPIC;  Surgeon: SON Rowe MD;  Location: [facility] OR;  Service: General;  Laterality: N/A;    LASER LITHOTRIPSY N/A 6/30/2021    Procedure: LITHOTRIPSY, USING LASER;  Surgeon: William Diaz MD;  Location: [facility] OR 1ST FLR;  Service: Urology;  Laterality: N/A;    LIVER BIOPSY N/A 5/4/2020    Procedure: BIOPSY, LIVER, Laproscopic ;  Surgeon: SON Rowe MD;  Location: [facility] OR;  Service: General;  Laterality: N/A;    perianal surgery      perianal cyst removed    PYELOSCOPY Right 6/30/2021    Procedure: PYELOSCOPY;  Surgeon: William Diaz MD;  Location: [facility] OR [address] [place] [address];  Service:  Urology;  Laterality: Right;    PYELOSCOPY Right 10/16/2023    Procedure: PYELOSCOPY;  Surgeon: Chrystal Dias MD;  Location: [facility] OR 1ST FLR;  Service: Urology;  Laterality: Right;    PYELOSCOPY Right 12/6/2023    Procedure: PYELOSCOPY;  Surgeon: William Diaz MD;  Location: [facility] OR 1ST FLR;  Service: Urology;  Laterality: Right;    REMOVAL-STENT Right 12/6/2023    Procedure: REMOVAL-STENT;  Surgeon: William Diaz MD;  Location: [facility] OR 1ST FLR;  Service: Urology;  Laterality: Right;    REPLACEMENT OF STENT Right 6/30/2021    Procedure: REPLACEMENT, STENT;  Surgeon: William Diaz MD;  Location: [facility] OR 1ST FLR;  Service: Urology;  Laterality: Right;    RETROGRADE PYELOGRAPHY Right 10/16/2023    Procedure: PYELOGRAM, RETROGRADE;  Surgeon: Chrystal Dias MD;  Location: [facility] OR 1ST FLR;  Service: Urology;  Laterality: Right;    RETROGRADE PYELOGRAPHY Right 12/6/2023    Procedure: PYELOGRAM, RETROGRADE;  Surgeon: William Diaz MD;  Location: [facility] OR 1ST FLR;  Service: Urology;  Laterality: Right;    URETERAL STENT PLACEMENT Right 10/16/2023    Procedure: INSERTION, STENT, URETER;  Surgeon: Chrystal Dias MD;  Location: [facility] OR 1ST FLR;  Service: Urology;  Laterality: Right;    URETEROSCOPY Right 6/30/2021    Procedure: URETEROSCOPY FLEXIBLE URETEROSCOPE;  Surgeon: William Diaz MD;  Location: [facility] OR 1ST FLR;  Service: Urology;  Laterality: Right;    URETEROSCOPY Right 10/16/2023    Procedure: URETEROSCOPY;  Surgeon: Chrystal Dias MD;  Location: [facility] OR 1ST FLR;  Service: Urology;  Laterality: Right;    URETEROSCOPY Right 12/6/2023    Procedure: URETEROSCOPY;  Surgeon: William Diaz MD;  Location: [facility] OR 1ST FLR;  Service: Urology;  Laterality: Right;       Family History       Problem Relation (Age of Onset)    Cancer Mother, Father, Paternal Grandfather, Brother    Diabetes Maternal Grandmother    Heart disease Father    No Known Problems Paternal Grandmother    Obesity  Sister    Parkinsonism Brother    Stroke Maternal Grandfather          Tobacco Use    Smoking status: Former     Current packs/day: 0.00     Average packs/day: 2.0 packs/day for 30.0 years (60.0 ttl pk-yrs)     Types: Cigarettes     Start date: 1975     Quit date: 2005     Years since quittin.9    Smokeless tobacco: Never   Substance and Sexual Activity    Alcohol use: No     Comment: started ~, reports 1 shot daily, max 3 shots daily, vague about alcohol consumption. Last drink 2018    Drug use: No    Sexual activity: Not on file     Review of Systems   Constitutional: Negative.    HENT:  Positive for hearing loss.    Respiratory: Negative.     Cardiovascular:  Positive for leg swelling.   Gastrointestinal: Negative.    Genitourinary: Negative.    Musculoskeletal:  Positive for joint swelling.   Skin:  Positive for color change and wound.   Neurological:  Positive for numbness.   Psychiatric/Behavioral: Negative.       Objective:     Vital Signs (Most Recent):  Temp: 97.9 °F (36.6 °C) (25)  Pulse: 77 (25)  Resp: 20 (25)  BP: 116/73 (25)  SpO2: 100 % (25) Vital Signs (24h Range):  Temp:  [97.9 °F (36.6 °C)-98.4 °F (36.9 °C)] 97.9 °F (36.6 °C)  Pulse:  [70-77] 77  Resp:  [9-22] 20  SpO2:  [96 %-100 %] 100 %  BP: (105-131)/(53-73) 116/73     Weight: 108.4 kg (238 lb 15.7 oz)  Body mass index is 37.43 kg/m².    Foot Exam    General  Orientation: alert and oriented to person, place, and time   Affect: appropriate       Left Foot/Ankle      Inspection and Palpation  Tenderness: none   Swelling: (diffuse LLE, increased 2nd digit)  Skin Exam: drainage, maceration, cellulitis, fissure, skin changes, abnormal color and erythema; no blister     Neurovascular  Dorsalis pedis: absent  Posterior tibial: absent  Saphenous nerve sensation: diminished  Tibial nerve sensation: diminished  Superficial peroneal nerve sensation: diminished  Deep peroneal nerve  "sensation: diminished  Sural nerve sensation: diminished    Tests  Osmel's sign: negative    Comments  Previously dry and stable eschar at distal aspect of 2nd digit now presents with moderate seropurulent drainage, periwound maceration, noted malodor, increased erythema and edema 2nd digit, and eschar cap at distal end of digit. No pain crepitus or fluctuance on palpation.                   Laboratory:  A1C:   Recent Labs   Lab 11/15/24  0956   HGBA1C 7.9*     CBC: No results for input(s): "WBC", "RBC", "HGB", "HCT", "PLT", "MCV", "MCH", "MCHC" in the last 168 hours.  CMP:   Recent Labs   Lab 01/03/25  1133   *   CALCIUM 7.9*   *   K 4.2   CO2 28   CL 96   BUN 47*   CREATININE 3.7*     CRP: No results for input(s): "CRP" in the last 168 hours.  ESR: No results for input(s): "SEDRATE" in the last 168 hours.  Microbiology Results (last 7 days)       Procedure Component Value Units Date/Time    Aerobic culture [[id number]] Collected: 01/03/25 1536    Order Status: Sent Specimen: Incision site from Toe, Left Foot Updated: 01/03/25 2012    Fungus culture [[id number]] Collected: 01/03/25 1536    Order Status: Sent Specimen: Incision site from Toe, Left Foot Updated: 01/03/25 2012    AFB Culture & Smear [[id number]] Collected: 01/03/25 1536    Order Status: Sent Specimen: Incision site from Toe, Left Foot Updated: 01/03/25 2012    Gram stain [[id number]] Collected: 01/03/25 1536    Order Status: Sent Specimen: Incision site from Toe, Left Foot Updated: 01/03/25 2012    Culture, Anaerobe [[id number]] Collected: 01/03/25 1536    Order Status: Sent Specimen: Incision site from Toe, Left Foot Updated: 01/03/25 2012          Specimen (24h ago, onward)       Start     Ordered    01/03/25 1535  Specimen to Pathology, Surgery Orthopedics  Once        Comments: Pre-op Diagnosis: Gangrene [I96]Procedure(s):AMPUTATION, TOE Number of specimens: 2Name of specimens: 1. Left 2nd toe- perm 2. Proximal margin     References:  "   Click here for ordering Quick Tip   Question Answer Comment   Procedure Type: Orthopedics    Release to patient Immediate        01/03/25 8184                    Diagnostic Results:  I have reviewed all pertinent imaging results/findings within the past 24 hours.

## 2025-01-04 NOTE — PLAN OF CARE
VN note: vn cued into room with pt permission. Pt refused VN at this time. Pt stated he had visitors. VN unable to complete admission at this time. progress notes, labs and vital signs reviewed.

## 2025-01-04 NOTE — ASSESSMENT & PLAN NOTE
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  Seen at Podiatry this morning and sent to hospital for OR    Now status post amputation, concern for mild area at base of medial great metatarsal.  Consult ID and Podiatry  Continue vancomycin and Zosyn renal dose  Pain control

## 2025-01-04 NOTE — HPI
69M PMH HTN, CAD, CHF, DM, CKD, PVD, obesity, admitted s/p L 2nd partial ray amputation. Pt presented to hospital for elective amputation of L 2nd digit due to ischemic changes, dry gangrene of distal toe. Pt presented to pre op evaluation with signs of active infection L 2nd digit and POCT glucose > 490. Pt endorses increased erythema, edema, drainage and new purulence from affected toe over the last 2 weeks. Pt states glucose monitor was not working over night. Pt denies any f/c/n/v/sob or any other pedal complaints. Admitted as inpatient after proceeding with surgery for infection and glucose management. No complications occurred during operation. Podiatry consulted s/p partial 2nd metatarsal amputation.

## 2025-01-04 NOTE — ASSESSMENT & PLAN NOTE
69-year-old male with a history of CKD 4, IDDM, and PAD who is admitted for eft 2nd metatarsal amputation fro gangrenous toe    -continue zosyn and vanco  -await pending cultures  -would plan for 5 days of therapy from amputation unless path shows margins involved

## 2025-01-04 NOTE — ASSESSMENT & PLAN NOTE
S/p L 2nd partial ray amputation DOS 1/3/25    - incision site left partially open proximally and packed with betadine soaked 1/4 inch packing strip, dressed with xeroform, gauze, cast padding, ACE compression bandage   - podiatry will change wound packing POD1   - pt instructed to keep dressing CDI  - post op xrays ordered, pending results   - consider MRI if suspicion for continued infection  - intra op deep wound cx sent for microbiology, pending results  - intra op specimen sent for pathology, pending results  - abx management per primary / ID, tailor to cx results as necessary  - glucose management per primary  - WBAT LLE, emphasis to heel touch in post op shoe  - podiatry will follow

## 2025-01-04 NOTE — SUBJECTIVE & OBJECTIVE
Interval History:  Doing well today without any significant acute complaints.  Pain reasonably controlled    Review of Systems  Objective:     Vital Signs (Most Recent):  Temp: 98.4 °F (36.9 °C) (01/04/25 1111)  Pulse: 80 (01/04/25 1111)  Resp: 17 (01/04/25 0716)  BP: 120/71 (01/04/25 1111)  SpO2: 96 % (01/04/25 1111) Vital Signs (24h Range):  Temp:  [97.5 °F (36.4 °C)-98.4 °F (36.9 °C)] 98.4 °F (36.9 °C)  Pulse:  [70-80] 80  Resp:  [9-22] 17  SpO2:  [96 %-100 %] 96 %  BP: (105-131)/(53-73) 120/71     Weight: 108.4 kg (238 lb 15.7 oz)  Body mass index is 37.43 kg/m².    Intake/Output Summary (Last 24 hours) at 1/4/2025 1327  Last data filed at 1/4/2025 1041  Gross per 24 hour   Intake 425 ml   Output 1050 ml   Net -625 ml         Physical Exam  Constitutional:       Appearance: Normal appearance.   HENT:      Head: Normocephalic.      Mouth/Throat:      Mouth: Mucous membranes are moist.      Pharynx: Oropharynx is clear.   Eyes:      Pupils: Pupils are equal, round, and reactive to light.   Cardiovascular:      Rate and Rhythm: Normal rate and regular rhythm.      Pulses: Normal pulses.      Heart sounds: Normal heart sounds.   Pulmonary:      Effort: Pulmonary effort is normal.      Breath sounds: Normal breath sounds.   Abdominal:      General: Bowel sounds are normal.      Palpations: Abdomen is soft.   Musculoskeletal:         General: Normal range of motion.      Cervical back: Normal range of motion.   Skin:     General: Skin is warm and dry.      Capillary Refill: Capillary refill takes less than 2 seconds.      Comments: Left foot bandaged postoperatively   Neurological:      General: No focal deficit present.      Mental Status: He is alert and oriented to person, place, and time. Mental status is at baseline.   Psychiatric:         Mood and Affect: Mood normal.         Behavior: Behavior normal.             Significant Labs: All pertinent labs within the past 24 hours have been reviewed.    Significant  Imaging: I have reviewed all pertinent imaging results/findings within the past 24 hours.

## 2025-01-04 NOTE — PLAN OF CARE
Problem: Adult Inpatient Plan of Care  Goal: Plan of Care Review  Outcome: Progressing  Goal: Patient-Specific Goal (Individualized)  Outcome: Progressing  Goal: Absence of Hospital-Acquired Illness or Injury  Outcome: Progressing  Goal: Optimal Comfort and Wellbeing  Outcome: Progressing  Goal: Readiness for Transition of Care  Outcome: Progressing     Problem: Diabetes Comorbidity  Goal: Blood Glucose Level Within Targeted Range  Outcome: Progressing     Problem: Bariatric Environmental Safety  Goal: Safety Maintained with Care  Outcome: Progressing     Problem: Wound  Goal: Optimal Coping  Outcome: Progressing  Goal: Optimal Functional Ability  Outcome: Progressing  Goal: Absence of Infection Signs and Symptoms  Outcome: Progressing  Goal: Improved Oral Intake  Outcome: Progressing  Goal: Optimal Pain Control and Function  Outcome: Progressing  Goal: Skin Health and Integrity  Outcome: Progressing  Goal: Optimal Wound Healing  Outcome: Progressing     Problem: Extremity Amputation  Goal: Optimal Coping with Amputation  Outcome: Progressing  Goal: Absence of Bleeding  Outcome: Progressing  Goal: Effective Bowel Elimination  Outcome: Progressing  Goal: Fluid and Electrolyte Balance  Outcome: Progressing  Goal: Optimal Functional Ability  Outcome: Progressing  Goal: Absence of Infection Signs and Symptoms  Outcome: Progressing  Goal: Anesthesia/Sedation Recovery  Outcome: Progressing  Goal: Acceptable Pain Control  Outcome: Progressing  Goal: Nausea and Vomiting Relief  Outcome: Progressing  Goal: Effective Urinary Elimination  Outcome: Progressing  Goal: Optimal Residual Limb Healing  Outcome: Progressing     Problem: Fall Injury Risk  Goal: Absence of Fall and Fall-Related Injury  Outcome: Progressing     Problem: Pain Acute  Goal: Optimal Pain Control and Function  Outcome: Progressing     Problem: Skin Injury Risk Increased  Goal: Skin Health and Integrity  Outcome: Progressing

## 2025-01-04 NOTE — PROGRESS NOTES
Rapid Response Nurse Follow-up Note     Called by primary RN for USGPIV placement RAC 20 placed.  No acute issues at this time. Reviewed plan of care with Bedside RN, Surya .   Please call Rapid Response RN, Mahogany Mahmood RN with any questions or concerns at N Phone [phone].

## 2025-01-04 NOTE — ASSESSMENT & PLAN NOTE
Patient's FSGs are uncontrolled due to hyperglycemia on current medication regimen.  Last A1c reviewed-   Lab Results   Component Value Date    HGBA1C 7.9 (H) 11/15/2024     Most recent fingerstick glucose reviewed-   Recent Labs   Lab 01/03/25  1214 01/03/25  1333 01/03/25  1640 01/03/25  2041   POCTGLUCOSE 416* 342* 226* 249*     Current correctional scale   home dosing insulin pump.  Currently at 1.6 units per hour with bolus dosing per pump.  Maintain anti-hyperglycemic dose as follows- continue home dose insulin pump.  If uncontrolled would DC his insulin pump and start basal and bolus dosing  Antihyperglycemics (From admission, onward)      Start     Stop Route Frequency Ordered    01/03/25 1930  insulin regular insulin pump from home        Question Answer Comment   Target number 150    Basal Rate #1 5    Basal rate #1 time 1730        -- SubQ Continuous 01/03/25 1829          Hold Oral hypoglycemics while patient is in the hospital.

## 2025-01-04 NOTE — NURSING
NEWLY ORDERED PAIN AND SLEEPING MEDICATION GIVEN TO PT AS ORDERED.  PT NOW IN AGREEANCE TO ALLOW ICU NURSE TO REGAIN IV ACCESS.

## 2025-01-04 NOTE — NURSING
Teaching provided regarding blood glucose readings, Patient's dexcom was on manual setting.  Corrected and placed on continuous/automatic settings.  Received insulin dose.

## 2025-01-04 NOTE — OP NOTE
[facility]  Operative Note      Date of Procedure: 1/3/2025     Procedure: Procedure(s) (LRB):  AMPUTATION, TOE (Left)     Surgeons and Role:     * Savanah Felton DPM - Primary  * Fernie Hudson DPM - Resident Assist    Pre-Operative Diagnosis: Gangrene [I96]    Post-Operative Diagnosis: Post-Op Diagnosis Codes:     * Gangrene [I96]    Anesthesia: Local MAC    Operative Findings (including complications, if any): s/p left 2nd toe amputation, malodor and mild purulent drainage noted. Bone is soft and fragmented to proximal phalanx.     Description of Technical Procedures: The patient was transported to the operating room on a stretcher in supine position. Anesthesia was induced. A tourniquet was applied to the L ankle, was not inflated at any point during the procedure. 20 mL of a 1:1 mixture of 0.5% bupivacaine plain and 1% lidocaine plain was locally infiltrated. Left lower lower limb was prepped and draped in a sterile manner.     Attention was directed to the L 2nd digit. A skin marker was used to plan a modified fishmouth incision just distal to and extending around the 2nd MPJ circumferentially, with attempts to salvage as much healthy tissue as possible to assist with primary closure. A 15 blade was utilized to create a full thickness incision down to bone, while a towel clamp was used to stabilize and maneuver the toe. Soft tissues around the proximal phalanx reflected via sharp 15 blade dissection exposing the 2nd MPJ. The toe was carefully disarticulated from the level of the 2nd MPJ, removed from the field, and sent for pathology. The 2nd metatarsal head was noted to be soft and destructed. Utilizing a rongeur, the distal portion of the first metatarsal was excised and removed from the field.  The excised bone was sent to pathology lab for further evaluation. Deep wound cultures were obtained and sent to microbiology lab for further evaluation. Nonviable soft tissues were resected to the level  of only healthy viable tissues via rongeur mechanical debridement. Distal traction was applied to remaining the exposed flexor and extensor tendons, and they were sharply transected as far back proximally as possible via sharp debridement. Excess skin and soft tissue was de bulked with a #15 blade and prepped for primary closure. The surgical site was copiously irrigated with normal saline via bulb syringe.     The surgical site was partially closed with 3-0 nylon with an opening to the proximal portion of the incision site. The opening was packed with Betadine-soaked gauze followed by dry gauze, cast padding, and ACE wrap.     The patient tolerated the procedure and anesthesia well. Patient was transferred to the recovery room with vital signs stable, vascular status intact, and capillary refill time <3 seconds to the distal L foot. Following a period of post-op monitoring, the patient will be admitted as an inpatient for further management of infection and glucose.      Estimated Blood Loss (EBL): 10 mL           Implants: * No implants in log *    Specimens:   Specimen (24h ago, onward)       Start     Ordered    01/03/25 1535  Specimen to Pathology, Surgery Orthopedics  Once        Comments: Pre-op Diagnosis: Gangrene [I96]Procedure(s):AMPUTATION, TOE Number of specimens: 2Name of specimens: 1. Left 2nd toe- perm 2. Proximal margin     References:    Click here for ordering Quick Tip   Question Answer Comment   Procedure Type: Orthopedics    Release to patient Immediate        01/03/25 6004                            Condition: Stable    Disposition: PACU - hemodynamically stable.    Attestation: I was present and scrubbed for the entire procedure.    Discharge Note    OUTCOME:  Patient tolerated the procedure well and without complication. Patient admitted as an inpatient for further infection and glucose management.    DISPOSITION: Admitted as an Inpatient    FINAL DIAGNOSIS:  Gangrene    FOLLOWUP: In  clinic    DISCHARGE INSTRUCTIONS:  No discharge procedures on file.     Clinical Reference Documents Added to Patient Instructions         Document    DIABETES AND INFECTIONS (ENGLISH)    FOOT CARE FOR DIABETICS (ENGLISH)    TYPE 2 DIABETES DISCHARGE INSTRUCTIONS (ENGLISH)    WOUND INFECTION (ENGLISH)

## 2025-01-04 NOTE — SUBJECTIVE & OBJECTIVE
Past Medical History:   Diagnosis Date    Alcohol abuse     Anasarca 1/28/2019    Anemia     Anticoagulant long-term use     Arthropathy associated with neurological disorder 9/2/2015    Atherosclerosis     Charcot foot due to diabetes mellitus     Chronic combined systolic and diastolic heart failure 01/29/2019 1-28-19 Left VentricleModerate decreased ejection fraction at 30%. Normal left ventricular cavity size. Normal wall thickness observed. Grade I (mild) left ventricular diastolic dysfunction consistent with impaired relaxation. Normal left atrial pressure. Moderate, global hypokinesis(see wall scoring diagram). Right VentricleNormal cavity size, wall thickness and ejection fraction. Wall motion n    Chronic pancreatitis 1/28/2019    CKD (chronic kidney disease) stage 3, GFR 30-59 ml/min     CKD (chronic kidney disease) stage 4, GFR 15-29 ml/min     Colon polyps     approx 5 yrs ago    Coronary artery disease due to calcified coronary lesion 05/08/2015    5 stents on ASA      Diabetic polyneuropathy associated with type 2 diabetes mellitus 9/2/2015    Diverticulosis 1/28/2019    DM type 2 with diabetic peripheral neuropathy 2/4/2019    Encounter for blood transfusion     Essential hypertension 1/28/2019    Former smoker 8/26/2015    Healed ulcer of left foot on examination 6/20/2017    Hematuria     Hydrocele     approx 1.5 yrs ago    Hyperphosphatemia     Hypoalbuminemia 2/4/2019    Hypocalcemia     Lymphedema of both lower extremities 1/29/2019    Mixed hyperlipidemia 5/8/2015    Morbid obesity with BMI of 50.0-59.9, adult 5/8/2015    Obstruction of right ureteropelvic junction (UPJ) due to stone 5/24/2021    Onychomycosis of multiple toenails with type 2 diabetes mellitus and peripheral neuropathy 6/20/2017    Perianal cyst     approx 2 yrs ago    Proteinuria     Pseudocyst of pancreas 1/28/2019 1-28-19 Liver has a cirrhotic morphology with no focal lesions.  Significant interval increase in ascites  when compared to prior exam which may account for patient's abdominal distension.  Hypodense air-fluid collection along the body of the pancreas which is slightly smaller when compared to prior CT.  Findings may relate to pancreatic necrosis with pancreatic pseudocysts with infected pseudocyst    Skin cancer     skin cancer    Sleep apnea 8/26/2015    Status post bariatric surgery 1/11/2016    Type 2 diabetes mellitus, with long-term current use of insulin 5/8/2015    Urinary tract infection        Past Surgical History:   Procedure Laterality Date    ANGIOGRAPHY N/A 6/28/2019    Procedure: ANGIOGRAM-PV STENT;  Surgeon: Ewa Diagnostic Provider;  Location: [facility] OR;  Service: Radiology;  Laterality: N/A;    ANGIOPLASTY      total x5 stents    AORTOGRAPHY WITH SERIALOGRAPHY N/A 11/26/2024    Procedure: AORTOGRAM, WITH SERIALOGRAPHY;  Surgeon: Clinton Gibbs MD;  Location: [facility] CATH LAB/EP;  Service: Cardiology;  Laterality: N/A;    COLONOSCOPY N/A 10/6/2015    Procedure: COLONOSCOPY;  Surgeon: Shekhar Richards MD;  Location: [facility] (2ND FLR);  Service: Endoscopy;  Laterality: N/A;  BMI over 55/2nd floor case    5 day hold Plavix, Dr Kwadwo Arroyo    COLONOSCOPY N/A 5/13/2021    Procedure: COLONOSCOPY;  Surgeon: Huan Brumfield MD;  Location: [facility];  Service: Endoscopy;  Laterality: N/A;    CORONARY ANGIOGRAPHY Right 3/20/2019    Procedure: ANGIOGRAM, CORONARY ARTERY;  Surgeon: Bob Duque MD;  Location: [facility] CATH LAB;  Service: Cardiology;  Laterality: Right;    CORONARY ARTERY BYPASS GRAFT  2017    x3    CORONARY BYPASS GRAFT ANGIOGRAPHY  3/20/2019    Procedure: Bypass graft study;  Surgeon: Bob Duque MD;  Location: [facility] CATH LAB;  Service: Cardiology;;    CYST REMOVAL      CYSTOSCOPY Right 6/30/2021    Procedure: CYSTOSCOPY;  Surgeon: William Diaz MD;  Location: [facility] 1ST FLR;  Service: Urology;  Laterality: Right;    CYSTOSCOPY N/A 10/16/2023    Procedure: CYSTOSCOPY;  Surgeon:  Chrystal Dias MD;  Location: [facility] OR 1ST FLR;  Service: Urology;  Laterality: N/A;    CYSTOSCOPY N/A 12/6/2023    Procedure: CYSTOSCOPY;  Surgeon: William Diaz MD;  Location: [facility] OR 1ST FLR;  Service: Urology;  Laterality: N/A;    CYSTOSCOPY W/ URETERAL STENT PLACEMENT Right 6/16/2021    Procedure: CYSTOSCOPY, WITH URETERAL STENT INSERTION;  Surgeon: William Diaz MD;  Location: [facility] OR 2ND FLR;  Service: Urology;  Laterality: Right;  FLUORO LESS THAN 1 HOUR    ENDOSCOPIC ULTRASOUND OF UPPER GASTROINTESTINAL TRACT N/A 2/26/2020    Procedure: ULTRASOUND, UPPER GI TRACT, ENDOSCOPIC;  Surgeon: Robbie Yang MD;  Location: [facility];  Service: Endoscopy;  Laterality: N/A;    ESOPHAGOGASTRODUODENOSCOPY N/A 7/8/2019    Procedure: ESOPHAGOGASTRODUODENOSCOPY (EGD);  Surgeon: Huan Brumfield MD;  Location: [facility];  Service: Endoscopy;  Laterality: N/A;    ESOPHAGOGASTRODUODENOSCOPY N/A 5/13/2021    Procedure: EGD (ESOPHAGOGASTRODUODENOSCOPY);  Surgeon: Huan Brumfield MD;  Location: [facility];  Service: Endoscopy;  Laterality: N/A;    EXCISION OF HYDROCELE Bilateral 6/16/2021    Procedure: HYDROCELE REPAIR;  Surgeon: William Diaz MD;  [facility];  Service: Urology;  Laterality: Bilateral;  2 HOURS    EXTRACTION - STONE Right 12/6/2023    Procedure: EXTRACTION - STONE;  Surgeon: William Diaz MD;  Location: [facility] OR 1ST FLR;  Service: Urology;  Laterality: Right;    FLUOROSCOPY N/A 6/16/2021    Procedure: FLUOROSCOPY;  Surgeon: William Diaz MD;  Location: [facility] OR 2ND FLR;  Service: Urology;  Laterality: N/A;    GASTRECTOMY      INSERTION OF DIALYSIS CATHETER N/A 5/17/2019    Procedure: pleurx;  Surgeon: Ewa Diagnostic Provider;  Location: [facility] OR 2ND FLR;  Service: General;  Laterality: N/A;  Room 188/[place]    KNEE ARTHROSCOPY      LAPAROSCOPIC CHOLECYSTECTOMY N/A 5/4/2020    Procedure: CHOLECYSTECTOMY, LAPAROSCOPIC;  Surgeon: SON Rowe MD;  Location: [facility];  Service:  General;  Laterality: N/A;    LASER LITHOTRIPSY N/A 6/30/2021    Procedure: LITHOTRIPSY, USING LASER;  Surgeon: William iDaz MD;  Location: [facility] OR 1ST FLR;  Service: Urology;  Laterality: N/A;    LIVER BIOPSY N/A 5/4/2020    Procedure: BIOPSY, LIVER, Laproscopic ;  Surgeon: SON Rowe MD;  Location: [facility];  Service: General;  Laterality: N/A;    perianal surgery      perianal cyst removed    PYELOSCOPY Right 6/30/2021    Procedure: PYELOSCOPY;  Surgeon: William Diaz MD;  Location: [facility] OR 1ST FLR;  Service: Urology;  Laterality: Right;    PYELOSCOPY Right 10/16/2023    Procedure: PYELOSCOPY;  Surgeon: Chrystal Dias MD;  Location: [facility] OR 1ST FLR;  Service: Urology;  Laterality: Right;    PYELOSCOPY Right 12/6/2023    Procedure: PYELOSCOPY;  Surgeon: William Diaz MD;  Location: [facility] OR 1ST FLR;  Service: Urology;  Laterality: Right;    REMOVAL-STENT Right 12/6/2023    Procedure: REMOVAL-STENT;  Surgeon: William Diaz MD;  Location: [facility] OR 1ST FLR;  Service: Urology;  Laterality: Right;    REPLACEMENT OF STENT Right 6/30/2021    Procedure: REPLACEMENT, STENT;  Surgeon: William Diaz MD;  Location: [facility] OR 1ST FLR;  Service: Urology;  Laterality: Right;    RETROGRADE PYELOGRAPHY Right 10/16/2023    Procedure: PYELOGRAM, RETROGRADE;  Surgeon: Chrystal Dias MD;  Location: [facility] OR 1ST FLR;  Service: Urology;  Laterality: Right;    RETROGRADE PYELOGRAPHY Right 12/6/2023    Procedure: PYELOGRAM, RETROGRADE;  Surgeon: William Diaz MD;  Location: [facility] OR 1ST FLR;  Service: Urology;  Laterality: Right;    URETERAL STENT PLACEMENT Right 10/16/2023    Procedure: INSERTION, STENT, URETER;  Surgeon: Chrystal Dias MD;  Location: [facility] OR 1ST FLR;  Service: Urology;  Laterality: Right;    URETEROSCOPY Right 6/30/2021    Procedure: URETEROSCOPY FLEXIBLE URETEROSCOPE;  Surgeon: William Diaz MD;  Location: [facility] OR [address] [place] [address];  Service: Urology;  Laterality: Right;    URETEROSCOPY  Right 10/16/2023    Procedure: URETEROSCOPY;  Surgeon: Chrystal Dias MD;  Location: [facility] OR [address] [place] [address];  Service: Urology;  Laterality: Right;    URETEROSCOPY Right 12/6/2023    Procedure: URETEROSCOPY;  Surgeon: William Diaz MD;  Location: [facility] OR [address] [place] [address];  Service: Urology;  Laterality: Right;       Review of patient's allergies indicates:  No Known Allergies    Medications:  Medications Prior to Admission   Medication Sig    isosorbide mononitrate (IMDUR) 30 MG 24 hr tablet Take 1 tablet (30 mg total) by mouth once daily.    acetaminophen (TYLENOL) 325 MG tablet Take 2 tablets (650 mg total) by mouth every 8 (eight) hours as needed for Pain.    aspirin (ECOTRIN) 81 MG EC tablet Take 1 tablet (81 mg total) by mouth once daily.    blood pressure monitor Kit 1 kit by Misc.(Non-Drug; Combo Route) route 2 (two) times a day.    blood-glucose sensor (DEXCOM G6 SENSOR) Sandi Inject 1 each into the skin every 10 days.    blood-glucose transmitter (DEXCOM G6 TRANSMITTER) Sandi Inject 1 each into the skin every 10 days.    clopidogreL (PLAVIX) 75 mg tablet Take 1 tablet (75 mg total) by mouth once daily.    glucagon (BAQSIMI) 3 mg/actuation Spry 1 each by Nasal route daily as needed (if glucose is less than 70 - can repeat in 1 hour if still low/less than 70).    HUMALOG KWIKPEN INSULIN 100 unit/mL pen Inject 5 Units into the skin before meals as needed (before each meal - if OFF of insulin pump). This is emergency back up insulin to use with meals if OFF of insulin pump    insulin detemir U-100, Levemir, 100 unit/mL (3 mL) SubQ InPn pen Inject 30 Units into the skin every evening. This is emergency back up insulin only if OFF of your insulin pump    insulin lispro-aabc (LYUMJEV U-100 INSULIN) 100 unit/mL Inject 80 Units into the skin continuous. Uses up to 80 units daily with omnipod 5 insulin pump as directed.    insulin pump cart,automated,BT (OMNIPOD 5 G6 PODS, GEN 5,) Crtg INJECT 1 EACH INTO THE SKIN EVERY  "OTHER DAY.    rosuvastatin (CRESTOR) 20 MG tablet Take 20 mg by mouth once daily.    torsemide (DEMADEX) 20 MG Tab Take 1 tablet (20 mg total) by mouth once daily.    [DISCONTINUED] blood sugar diagnostic (ONETOUCH VERIO TEST STRIPS) Strp 1 each by Misc.(Non-Drug; Combo Route) route 2 (two) times a day.    [DISCONTINUED] lancets (ONETOUCH DELICA PLUS LANCET) 33 gauge Misc 1 lancet  by Misc.(Non-Drug; Combo Route) route 2 (two) times daily.    [DISCONTINUED] pen needle, diabetic 32 gauge x 5/32" Ndle Use with toujeo insulin one daily only as Emergency use/back up insulin - if OFF OF your insulin pump.     Antibiotics (From admission, onward)      Start     Stop Route Frequency Ordered    01/03/25 2100  piperacillin-tazobactam (ZOSYN) 4.5 g in D5W 100 mL IVPB (MB+)         -- IV Every 12 hours (non-standard times) 01/03/25 1750    01/03/25 1850  vancomycin - pharmacy to dose  (vancomycin IVPB (PEDS and ADULTS))        Placed in "And" Linked Group    -- IV pharmacy to manage frequency 01/03/25 1750          Antifungals (From admission, onward)      None          Antivirals (From admission, onward)      None             Immunization History   Administered Date(s) Administered    COVID-19, MRNA, LN-S, PF (MODERNA FULL 0.5 ML DOSE) 01/27/2021, 02/24/2021, 11/22/2021    Influenza 10/31/2011, 12/03/2014    Influenza (FLUAD) - Quadrivalent - Adjuvanted - PF *Preferred* (65+) 09/30/2020, 10/27/2023    Influenza - Quadrivalent - PF *Preferred* (6 months and older) 10/31/2011, 12/03/2014    Influenza - Trivalent - Fluad - Adjuvanted - PF (65 years and older 11/28/2024    Influenza - Trivalent - Fluzone High Dose - PF (65 years and older) 11/15/2019    Influenza A (H1N1) 2009 Monovalent - IM 11/18/2009    PPD Test 07/11/2019    Pneumococcal Polysaccharide - 23 Valent 10/26/2020    Tdap 03/13/2017       Family History       Problem Relation (Age of Onset)    Cancer Mother, Father, Paternal Grandfather, Brother    Diabetes " Maternal Grandmother    Heart disease Father    No Known Problems Paternal Grandmother    Obesity Sister    Parkinsonism Brother    Stroke Maternal Grandfather          Social History     Socioeconomic History    Marital status:    Tobacco Use    Smoking status: Former     Current packs/day: 0.00     Average packs/day: 2.0 packs/day for 30.0 years (60.0 ttl pk-yrs)     Types: Cigarettes     Start date: 1975     Quit date: 2005     Years since quittin.9    Smokeless tobacco: Never   Substance and Sexual Activity    Alcohol use: No     Comment: started ~, reports 1 shot daily, max 3 shots daily, vague about alcohol consumption. Last drink 2018    Drug use: No     Social Drivers of Health     Financial Resource Strain: Patient Declined (1/3/2025)    Overall Financial Resource Strain (CARDIA)     Difficulty of Paying Living Expenses: Patient declined   Food Insecurity: Patient Declined (1/3/2025)    Hunger Vital Sign     Worried About Running Out of Food in the Last Year: Patient declined     Ran Out of Food in the Last Year: Patient declined   Transportation Needs: Patient Declined (1/3/2025)    TRANSPORTATION NEEDS     Transportation : Patient declined   Physical Activity: Patient Declined (2024)    Exercise Vital Sign     Days of Exercise per Week: Patient declined     Minutes of Exercise per Session: Patient declined   Stress: Patient Declined (1/3/2025)    [employer] - Occupational Stress Questionnaire     Feeling of Stress : Patient declined   Housing Stability: Patient Declined (1/3/2025)    Housing Stability Vital Sign     Unable to Pay for Housing in the Last Year: Patient declined     Homeless in the Last Year: Patient declined     Review of Systems   Cardiovascular:  Positive for leg swelling.   Musculoskeletal:  Positive for arthralgias (LLE).   All other systems reviewed and are negative.    Objective:     Vital Signs (Most Recent):  Temp: 98.4  °F (36.9 °C) (01/04/25 1111)  Pulse: 80 (01/04/25 1111)  Resp: 17 (01/04/25 0716)  BP: 120/71 (01/04/25 1111)  SpO2: 96 % (01/04/25 1111) Vital Signs (24h Range):  Temp:  [97.5 °F (36.4 °C)-98.4 °F (36.9 °C)] 98.4 °F (36.9 °C)  Pulse:  [70-80] 80  Resp:  [9-22] 17  SpO2:  [96 %-100 %] 96 %  BP: (105-131)/(53-73) 120/71     Weight: 108.4 kg (238 lb 15.7 oz)  Body mass index is 37.43 kg/m².    Estimated Creatinine Clearance: 21.8 mL/min (A) (based on SCr of 3.7 mg/dL (H)).     Physical Exam  Constitutional:       Appearance: Normal appearance.   HENT:      Head: Normocephalic.      Mouth/Throat:      Mouth: Mucous membranes are moist.      Pharynx: Oropharynx is clear.   Eyes:      Pupils: Pupils are equal, round, and reactive to light.   Cardiovascular:      Rate and Rhythm: Normal rate and regular rhythm.      Pulses: Normal pulses.      Heart sounds: Normal heart sounds.   Pulmonary:      Effort: Pulmonary effort is normal.      Breath sounds: Normal breath sounds.   Abdominal:      General: Bowel sounds are normal.      Palpations: Abdomen is soft.   Musculoskeletal:         General: Normal range of motion.      Cervical back: Normal range of motion.   Skin:     General: Skin is warm and dry.      Capillary Refill: Capillary refill takes less than 2 seconds.   Neurological:      General: No focal deficit present.      Mental Status: He is alert and oriented to person, place, and time. Mental status is at baseline.   Psychiatric:         Mood and Affect: Mood normal.         Behavior: Behavior normal.          Significant Labs: All pertinent labs within the past 24 hours have been reviewed.    Significant Imaging: I have reviewed all pertinent imaging results/findings within the past 24 hours.

## 2025-01-04 NOTE — HPI
Patient is a 69-year-old male with a history of CKD 4, IDDM, PAF presented as direct admit from Podiatry.  Patient at home for left 2nd metatarsal amputation for gangrenous toe.  Patient was evaluated at bedside postoperatively denies any pain.  Patient has insulin pump which apparently came dislodged last night which is his reading for uncontrolled glucose this morning.  Patient will be admitted to Hospital Medicine we will start antibiotics consult podiatry and ID.

## 2025-01-04 NOTE — H&P
[facility] Medicine  History & Physical    Patient Name: Jian Arrieta  MRN: [id number]  Patient Class: IP- Inpatient  Admission Date: 1/3/2025  Attending Physician: Mohinder Jimenes,*   Primary Care Provider: Reza Boyer MD         Patient information was obtained from patient, spouse/SO, and ER records.     Subjective:     Principal Problem:Gangrene    Chief Complaint: No chief complaint on file.       HPI: Patient is a 69-year-old male with a history of CKD 4, IDDM, PAF presented as direct admit from Podiatry.  Patient at home for left 2nd metatarsal amputation for gangrenous toe.  Patient was evaluated at bedside postoperatively denies any pain.  Patient has insulin pump which apparently came dislodged last night which is his reading for uncontrolled glucose this morning.  Patient will be admitted to Hospital Medicine we will start antibiotics consult podiatry and ID.      Past Medical History:   Diagnosis Date    Alcohol abuse     Anasarca 1/28/2019    Anemia     Anticoagulant long-term use     Arthropathy associated with neurological disorder 9/2/2015    Atherosclerosis     Charcot foot due to diabetes mellitus     Chronic combined systolic and diastolic heart failure 01/29/2019 1-28-19 Left VentricleModerate decreased ejection fraction at 30%. Normal left ventricular cavity size. Normal wall thickness observed. Grade I (mild) left ventricular diastolic dysfunction consistent with impaired relaxation. Normal left atrial pressure. Moderate, global hypokinesis(see wall scoring diagram). Right VentricleNormal cavity size, wall thickness and ejection fraction. Wall motion n    Chronic pancreatitis 1/28/2019    CKD (chronic kidney disease) stage 3, GFR 30-59 ml/min     CKD (chronic kidney disease) stage 4, GFR 15-29 ml/min     Colon polyps     approx 5 yrs ago    Coronary artery disease due to calcified coronary lesion 05/08/2015    5 stents on ASA      Diabetic polyneuropathy  associated with type 2 diabetes mellitus 9/2/2015    Diverticulosis 1/28/2019    DM type 2 with diabetic peripheral neuropathy 2/4/2019    Encounter for blood transfusion     Essential hypertension 1/28/2019    Former smoker 8/26/2015    Healed ulcer of left foot on examination 6/20/2017    Hematuria     Hydrocele     approx 1.5 yrs ago    Hyperphosphatemia     Hypoalbuminemia 2/4/2019    Hypocalcemia     Lymphedema of both lower extremities 1/29/2019    Mixed hyperlipidemia 5/8/2015    Morbid obesity with BMI of 50.0-59.9, adult 5/8/2015    Obstruction of right ureteropelvic junction (UPJ) due to stone 5/24/2021    Onychomycosis of multiple toenails with type 2 diabetes mellitus and peripheral neuropathy 6/20/2017    Perianal cyst     approx 2 yrs ago    Proteinuria     Pseudocyst of pancreas 1/28/2019 1-28-19 Liver has a cirrhotic morphology with no focal lesions.  Significant interval increase in ascites when compared to prior exam which may account for patient's abdominal distension.  Hypodense air-fluid collection along the body of the pancreas which is slightly smaller when compared to prior CT.  Findings may relate to pancreatic necrosis with pancreatic pseudocysts with infected pseudocyst    Skin cancer     skin cancer    Sleep apnea 8/26/2015    Status post bariatric surgery 1/11/2016    Type 2 diabetes mellitus, with long-term current use of insulin 5/8/2015    Urinary tract infection        Past Surgical History:   Procedure Laterality Date    ANGIOGRAPHY N/A 6/28/2019    Procedure: ANGIOGRAM-PV STENT;  Surgeon: Ewa Diagnostic Provider;  Location: [facility] OR;  Service: Radiology;  Laterality: N/A;    ANGIOPLASTY      total x5 stents    AORTOGRAPHY WITH SERIALOGRAPHY N/A 11/26/2024    Procedure: AORTOGRAM, WITH SERIALOGRAPHY;  Surgeon: Clinton Gibbs MD;  Location: [facility] CATH LAB/EP;  Service: Cardiology;  Laterality: N/A;    COLONOSCOPY N/A 10/6/2015    Procedure: COLONOSCOPY;  Surgeon: Shekhar Richards MD;   Location: [facility] ENDO (2ND FLR);  Service: Endoscopy;  Laterality: N/A;  BMI over 55/2nd floor case    5 day hold Plavix, Dr Kwadwo Arroyo    COLONOSCOPY N/A 5/13/2021    Procedure: COLONOSCOPY;  Surgeon: Huan Brumfield MD;  Location: [facility];  Service: Endoscopy;  Laterality: N/A;    CORONARY ANGIOGRAPHY Right 3/20/2019    Procedure: ANGIOGRAM, CORONARY ARTERY;  Surgeon: Bob Duque MD;  Location: [facility] CATH LAB;  Service: Cardiology;  Laterality: Right;    CORONARY ARTERY BYPASS GRAFT  2017    x3    CORONARY BYPASS GRAFT ANGIOGRAPHY  3/20/2019    Procedure: Bypass graft study;  Surgeon: Bob Duque MD;  Location: [facility] CATH LAB;  Service: Cardiology;;    CYST REMOVAL      CYSTOSCOPY Right 6/30/2021    Procedure: CYSTOSCOPY;  Surgeon: William Diaz MD;  Location: [facility] OR 1ST FLR;  Service: Urology;  Laterality: Right;    CYSTOSCOPY N/A 10/16/2023    Procedure: CYSTOSCOPY;  Surgeon: Chrystal Dias MD;  Location: [facility] OR 1ST FLR;  Service: Urology;  Laterality: N/A;    CYSTOSCOPY N/A 12/6/2023    Procedure: CYSTOSCOPY;  Surgeon: William Diaz MD;  Location: [facility] OR 1ST FLR;  Service: Urology;  Laterality: N/A;    CYSTOSCOPY W/ URETERAL STENT PLACEMENT Right 6/16/2021    Procedure: CYSTOSCOPY, WITH URETERAL STENT INSERTION;  Surgeon: William Diaz MD;  Location: [facility] OR 2ND FLR;  Service: Urology;  Laterality: Right;  FLUORO LESS THAN 1 HOUR    ENDOSCOPIC ULTRASOUND OF UPPER GASTROINTESTINAL TRACT N/A 2/26/2020    Procedure: ULTRASOUND, UPPER GI TRACT, ENDOSCOPIC;  Surgeon: Robbie Yang MD;  Location: [facility];  Service: Endoscopy;  Laterality: N/A;    ESOPHAGOGASTRODUODENOSCOPY N/A 7/8/2019    Procedure: ESOPHAGOGASTRODUODENOSCOPY (EGD);  Surgeon: Huan Brumfield MD;  Location: [facility];  Service: Endoscopy;  Laterality: N/A;    ESOPHAGOGASTRODUODENOSCOPY N/A 5/13/2021    Procedure: EGD (ESOPHAGOGASTRODUODENOSCOPY);  Surgeon: Huan Brumfield MD;  Location: [facility];  Service:  Endoscopy;  Laterality: N/A;    EXCISION OF HYDROCELE Bilateral 6/16/2021    Procedure: HYDROCELE REPAIR;  Surgeon: William Diaz MD;  Location: [facility] OR 2ND FLR;  Service: Urology;  Laterality: Bilateral;  2 HOURS    EXTRACTION - STONE Right 12/6/2023    Procedure: EXTRACTION - STONE;  Surgeon: William Diaz MD;  Location: [facility] OR 1ST FLR;  Service: Urology;  Laterality: Right;    FLUOROSCOPY N/A 6/16/2021    Procedure: FLUOROSCOPY;  Surgeon: William Diaz MD;  Location: [facility] OR 2ND FLR;  Service: Urology;  Laterality: N/A;    GASTRECTOMY      INSERTION OF DIALYSIS CATHETER N/A 5/17/2019    Procedure: pleurx;  Surgeon: [facility] Diagnostic Provider;  Location: [facility] OR 2ND FLR;  Service: General;  Laterality: N/A;  Room 188/[facility]    KNEE ARTHROSCOPY      LAPAROSCOPIC CHOLECYSTECTOMY N/A 5/4/2020    Procedure: CHOLECYSTECTOMY, LAPAROSCOPIC;  Surgeon: SON Rowe MD;  Location: [facility] OR;  Service: General;  Laterality: N/A;    LASER LITHOTRIPSY N/A 6/30/2021    Procedure: LITHOTRIPSY, USING LASER;  Surgeon: William Diaz MD;  [facility];  Service: Urology;  Laterality: N/A;    LIVER BIOPSY N/A 5/4/2020    Procedure: BIOPSY, LIVER, Laproscopic ;  Surgeon: SON Rowe MD;  Location: [facility] OR;  Service: General;  Laterality: N/A;    perianal surgery      perianal cyst removed    PYELOSCOPY Right 6/30/2021    Procedure: PYELOSCOPY;  Surgeon: William Diaz MD;  Location: [facility] OR 1ST FLR;  Service: Urology;  Laterality: Right;    PYELOSCOPY Right 10/16/2023    Procedure: PYELOSCOPY;  Surgeon: Chrystal Dias MD;  Location: [facility] OR 1ST FLR;  Service: Urology;  Laterality: Right;    PYELOSCOPY Right 12/6/2023    Procedure: PYELOSCOPY;  Surgeon: William Diaz MD;  Location: [facility] OR 1ST FLR;  Service: Urology;  Laterality: Right;    REMOVAL-STENT Right 12/6/2023    Procedure: REMOVAL-STENT;  Surgeon: William Diaz MD;  Location: [facility] OR [address] [place] [address];  Service: Urology;  Laterality: Right;     REPLACEMENT OF STENT Right 6/30/2021    Procedure: REPLACEMENT, STENT;  Surgeon: William Diaz MD;  Location: [facility] OR [facility] FLR;  Service: Urology;  Laterality: Right;    RETROGRADE PYELOGRAPHY Right 10/16/2023    Procedure: PYELOGRAM, RETROGRADE;  Surgeon: Chrystal Dias MD;  Location: [facility] OR [facility] FLR;  Service: Urology;  Laterality: Right;    RETROGRADE PYELOGRAPHY Right 12/6/2023    Procedure: PYELOGRAM, RETROGRADE;  Surgeon: William Diaz MD;  Location: [facility] OR [facility] FLR;  Service: Urology;  Laterality: Right;    URETERAL STENT PLACEMENT Right 10/16/2023    Procedure: INSERTION, STENT, URETER;  Surgeon: Chrystal Dias MD;  Location: [facility] OR [facility] FLR;  Service: Urology;  Laterality: Right;    URETEROSCOPY Right 6/30/2021    Procedure: URETEROSCOPY FLEXIBLE URETEROSCOPE;  Surgeon: William Diaz MD;  [facility];  Service: Urology;  Laterality: Right;    URETEROSCOPY Right 10/16/2023    Procedure: URETEROSCOPY;  Surgeon: Chrystal Dias MD;  [facility];  Service: Urology;  Laterality: Right;    URETEROSCOPY Right 12/6/2023    Procedure: URETEROSCOPY;  Surgeon: William Diaz MD;  [facility];  Service: Urology;  Laterality: Right;       Review of patient's allergies indicates:  No Known Allergies    No current facility-administered medications on file prior to encounter.     Current Outpatient Medications on File Prior to Encounter   Medication Sig    isosorbide mononitrate (IMDUR) 30 MG 24 hr tablet Take 1 tablet (30 mg total) by mouth once daily.    acetaminophen (TYLENOL) 325 MG tablet Take 2 tablets (650 mg total) by mouth every 8 (eight) hours as needed for Pain.    aspirin (ECOTRIN) 81 MG EC tablet Take 1 tablet (81 mg total) by mouth once daily.    blood pressure monitor Kit 1 kit by Misc.(Non-Drug; Combo Route) route 2 (two) times a day.    blood-glucose sensor (DEXCOM G6 SENSOR) Sandi Inject 1 each into the skin every 10 days.     "blood-glucose transmitter (DEXCOM G6 TRANSMITTER) Sandi Inject 1 each into the skin every 10 days.    clopidogreL (PLAVIX) 75 mg tablet Take 1 tablet (75 mg total) by mouth once daily.    glucagon (BAQSIMI) 3 mg/actuation Spry 1 each by Nasal route daily as needed (if glucose is less than 70 - can repeat in 1 hour if still low/less than 70).    HUMALOG KWIKPEN INSULIN 100 unit/mL pen Inject 5 Units into the skin before meals as needed (before each meal - if OFF of insulin pump). This is emergency back up insulin to use with meals if OFF of insulin pump    insulin detemir U-100, Levemir, 100 unit/mL (3 mL) SubQ InPn pen Inject 30 Units into the skin every evening. This is emergency back up insulin only if OFF of your insulin pump    insulin lispro-aabc (LYUMJEV U-100 INSULIN) 100 unit/mL Inject 80 Units into the skin continuous. Uses up to 80 units daily with omnipod 5 insulin pump as directed.    insulin pump cart,automated,BT (OMNIPOD 5 G6 PODS, GEN 5,) Crtg INJECT 1 EACH INTO THE SKIN EVERY OTHER DAY.    rosuvastatin (CRESTOR) 20 MG tablet Take 20 mg by mouth once daily.    torsemide (DEMADEX) 20 MG Tab Take 1 tablet (20 mg total) by mouth once daily.    [DISCONTINUED] blood sugar diagnostic (ONETOUCH VERIO TEST STRIPS) Strp 1 each by Misc.(Non-Drug; Combo Route) route 2 (two) times a day.    [DISCONTINUED] lancets (ONETOUCH DELICA PLUS LANCET) 33 gauge Misc 1 lancet  by Misc.(Non-Drug; Combo Route) route 2 (two) times daily.    [DISCONTINUED] pen needle, diabetic 32 gauge x 5/32" Ndle Use with toujeo insulin one daily only as Emergency use/back up insulin - if OFF OF your insulin pump.     Family History       Problem Relation (Age of Onset)    Cancer Mother, Father, Paternal Grandfather, Brother    Diabetes Maternal Grandmother    Heart disease Father    No Known Problems Paternal Grandmother    Obesity Sister    Parkinsonism Brother    Stroke Maternal Grandfather          Tobacco Use    Smoking status: Former    "  Current packs/day: 0.00     Average packs/day: 2.0 packs/day for 30.0 years (60.0 ttl pk-yrs)     Types: Cigarettes     Start date: 1975     Quit date: 2005     Years since quittin.9    Smokeless tobacco: Never   Substance and Sexual Activity    Alcohol use: No     Comment: started ~, reports 1 shot daily, max 3 shots daily, vague about alcohol consumption. Last drink 2018    Drug use: No    Sexual activity: Not on file     Review of Systems   Cardiovascular:  Positive for leg swelling.   Musculoskeletal:  Positive for arthralgias (LLE).   All other systems reviewed and are negative.    Objective:     Vital Signs (Most Recent):  Temp: 97.5 °F (36.4 °C) (25)  Pulse: 76 (25 232)  Resp: 18 (25)  BP: 108/62 (254)  SpO2: 98 % (25) Vital Signs (24h Range):  Temp:  [97.5 °F (36.4 °C)-98.4 °F (36.9 °C)] 97.5 °F (36.4 °C)  Pulse:  [70-77] 76  Resp:  [9-22] 18  SpO2:  [96 %-100 %] 98 %  BP: (105-131)/(53-73) 108/62     Weight: 108.4 kg (238 lb 15.7 oz)  Body mass index is 37.43 kg/m².     Physical Exam  Constitutional:       Appearance: Normal appearance.   HENT:      Head: Normocephalic.      Mouth/Throat:      Mouth: Mucous membranes are moist.      Pharynx: Oropharynx is clear.   Eyes:      Pupils: Pupils are equal, round, and reactive to light.   Cardiovascular:      Rate and Rhythm: Normal rate and regular rhythm.      Pulses: Normal pulses.      Heart sounds: Normal heart sounds.   Pulmonary:      Effort: Pulmonary effort is normal.      Breath sounds: Normal breath sounds.   Abdominal:      General: Bowel sounds are normal.      Palpations: Abdomen is soft.   Musculoskeletal:         General: Normal range of motion.      Cervical back: Normal range of motion.      RLE edema +2 LLE edema +4  Skin:     General: Skin is warm and dry.      Capillary Refill: Capillary refill takes less than 2 seconds.   Neurological:      General: No focal deficit  present.      Mental Status: He is alert and oriented to person, place, and time. Mental status is at baseline.   Psychiatric:         Mood and Affect: Mood normal.         Behavior: Behavior normal.              CRANIAL NERVES     CN III, IV, VI   Pupils are equal, round, and reactive to light.       Significant Labs: All pertinent labs within the past 24 hours have been reviewed.  Recent Lab Results  (Last 5 results in the past 24 hours)        01/03/25  2118   01/03/25  2041   01/03/25  1640   01/03/25  1333   01/03/25  1214        POC Glucose 204               POCT Glucose   249   226   342   416                              Significant Imaging: I have reviewed all pertinent imaging results/findings within the past 24 hours.  I have reviewed and interpreted all pertinent imaging results/findings within the past 24 hours.  Assessment/Plan:     * Gangrene  Gangrene to left 2nd metatarsal  Ongoing for the last 4 weeks progressively worsened.  Seen at Podiatry this morning and sent to hospital for OR    Now status post amputation., concern for mild area at base of medial great metatarsal.  Consult ID and Podiatry  Continue vancomycin and Zosyn renal dose  Pain control      CKD (chronic kidney disease) stage 4, GFR 15-29 ml/min  Creatine stable for now. BMP reviewed- noted Estimated Creatinine Clearance: 21.8 mL/min (A) (based on SCr of 3.7 mg/dL (H)). according to latest data. Based on current GFR, CKD stage is stage 4 - GFR 15-29.  Monitor UOP and serial BMP and adjust therapy as needed. Renally dose meds. Avoid nephrotoxic medications and procedures.    Type 2 diabetes mellitus with left diabetic foot infection  Patient's FSGs are uncontrolled due to hyperglycemia on current medication regimen.  Last A1c reviewed-   Lab Results   Component Value Date    HGBA1C 7.9 (H) 11/15/2024     Most recent fingerstick glucose reviewed-   Recent Labs   Lab 01/03/25  1214 01/03/25  1333 01/03/25  1640 01/03/25 2041    POCTGLUCOSE 416* 342* 226* 249*     Current correctional scale   home dosing insulin pump.  Currently at 1.6 units per hour with bolus dosing per pump.  Maintain anti-hyperglycemic dose as follows- continue home dose insulin pump.  If uncontrolled would DC his insulin pump and start basal and bolus dosing  Antihyperglycemics (From admission, onward)      Start     Stop Route Frequency Ordered    01/03/25 1930  insulin regular insulin pump from home        Question Answer Comment   Target number 150    Basal Rate #1 5    Basal rate #1 time 1730        -- SubQ Continuous 01/03/25 1829          Hold Oral hypoglycemics while patient is in the hospital.    Coronary artery disease due to calcified coronary lesion  Patient with known CAD s/p stent placement and CABG, which is controlled Will continue ASA, Plavix, and Statin and monitor for S/Sx of angina/ACS. Continue to monitor on telemetry.       VTE Risk Mitigation (From admission, onward)           Ordered     IP VTE HIGH RISK PATIENT  Once         01/03/25 1649     Place sequential compression device  Until discontinued         01/03/25 1649                               Rapid Response Nurse Follow-up Note     Called by primary RN for USGPIV placement RAC 20 placed.  No acute issues at this time. Reviewed plan of care with Bedside RNSurya .   Please call Rapid Response RN, Mahogany Mahmood RN with any questions or concerns at N Phone [phone].          Chapo Neal NP  Department of Hospital Medicine  [facility]

## 2025-01-04 NOTE — NURSING
VIRTUAL NURSE: Pt arrived to unit. Permission received to turn camera to view patient. VIP model explained; Patient informed this VN will be working with bedside nurse and the rest of the care team.      Admission questions completed.  Plan of care reviewed with patient.  Educated patient on VTE and fall risk. Safety precautions in place. Call light within reach, side rails up x2.     Patient instucted to ask staff for assistance. Patient verbalized complete understanding.  Will continue to be available and intervene as needed.    Labs, notes, orders, and careplan reviewed.    01/03/25 2135   Admission   Initial VN Admission Questions Complete   Shift   Virtual Nurse - Rounding Complete   Virtual Nurse - Patient Verbalized Approval Of Camera Use;VN Rounding   Type of Frequent Check   Type Patient Rounds   Safety/Activity   Patient Rounds bed in low position;bed wheels locked;call light in patient/parent reach;ID band on;visualized patient   Safety Promotion/Fall Prevention assistive device/personal item within reach   Safety Precautions emergency equipment at bedside   Activity Assistance Provided assistance, 1 person   Positioning   Body Position position changed independently   Head of Bed (HOB) Positioning HOB elevated

## 2025-01-04 NOTE — PROGRESS NOTES
[facility] Medicine  Progress Note    Patient Name: Jian Arrieta  MRN: [id number]  Patient Class: IP- Inpatient   Admission Date: 1/3/2025  Length of Stay: 1 days  Attending Physician: Mohinder Jimenes,*  Primary Care Provider: Reza Boyer MD        Subjective     Principal Problem:Gangrene        HPI:  Patient is a 69-year-old male with a history of CKD 4, IDDM, PAF presented as direct admit from Podiatry.  Patient at home for left 2nd metatarsal amputation for gangrenous toe.  Patient was evaluated at bedside postoperatively denies any pain.  Patient has insulin pump which apparently came dislodged last night which is his reading for uncontrolled glucose this morning.  Patient will be admitted to Hospital Medicine we will start antibiotics consult podiatry and ID.      Overview/Hospital Course:  No notes on file    Interval History:  Doing well today without any significant acute complaints.  Pain reasonably controlled    Review of Systems  Objective:     Vital Signs (Most Recent):  Temp: 98.4 °F (36.9 °C) (01/04/25 1111)  Pulse: 80 (01/04/25 1111)  Resp: 17 (01/04/25 0716)  BP: 120/71 (01/04/25 1111)  SpO2: 96 % (01/04/25 1111) Vital Signs (24h Range):  Temp:  [97.5 °F (36.4 °C)-98.4 °F (36.9 °C)] 98.4 °F (36.9 °C)  Pulse:  [70-80] 80  Resp:  [9-22] 17  SpO2:  [96 %-100 %] 96 %  BP: (105-131)/(53-73) 120/71     Weight: 108.4 kg (238 lb 15.7 oz)  Body mass index is 37.43 kg/m².    Intake/Output Summary (Last 24 hours) at 1/4/2025 1327  Last data filed at 1/4/2025 1041  Gross per 24 hour   Intake 425 ml   Output 1050 ml   Net -625 ml         Physical Exam  Constitutional:       Appearance: Normal appearance.   HENT:      Head: Normocephalic.      Mouth/Throat:      Mouth: Mucous membranes are moist.      Pharynx: Oropharynx is clear.   Eyes:      Pupils: Pupils are equal, round, and reactive to light.   Cardiovascular:      Rate and Rhythm: Normal rate and regular rhythm.      Pulses:  Normal pulses.      Heart sounds: Normal heart sounds.   Pulmonary:      Effort: Pulmonary effort is normal.      Breath sounds: Normal breath sounds.   Abdominal:      General: Bowel sounds are normal.      Palpations: Abdomen is soft.   Musculoskeletal:         General: Normal range of motion.      Cervical back: Normal range of motion.   Skin:     General: Skin is warm and dry.      Capillary Refill: Capillary refill takes less than 2 seconds.      Comments: Left foot bandaged postoperatively   Neurological:      General: No focal deficit present.      Mental Status: He is alert and oriented to person, place, and time. Mental status is at baseline.   Psychiatric:         Mood and Affect: Mood normal.         Behavior: Behavior normal.             Significant Labs: All pertinent labs within the past 24 hours have been reviewed.    Significant Imaging: I have reviewed all pertinent imaging results/findings within the past 24 hours.    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  Seen at Podiatry this morning and sent to hospital for OR    Now status post amputation, concern for mild area at base of medial great metatarsal.  Consult ID and Podiatry  Continue vancomycin and Zosyn renal dose  Pain control      CKD (chronic kidney disease) stage 4, GFR 15-29 ml/min  Creatine stable for now. BMP reviewed- noted Estimated Creatinine Clearance: 21.8 mL/min (A) (based on SCr of 3.7 mg/dL (H)). according to latest data. Based on current GFR, CKD stage is stage 4 - GFR 15-29.  Monitor UOP and serial BMP and adjust therapy as needed. Renally dose meds. Avoid nephrotoxic medications and procedures.    Type 2 diabetes mellitus with left diabetic foot infection  Patient's FSGs are uncontrolled due to hyperglycemia on current medication regimen.  Last A1c reviewed-   Lab Results   Component Value Date    HGBA1C 9.7 (H) 01/04/2025     Most recent  "fingerstick glucose reviewed-   Recent Labs   Lab 01/03/25  1333 01/03/25  1640 01/03/25  2041 01/04/25  0347   POCTGLUCOSE 342* 226* 249* 204*     Current correctional scale   home dosing insulin pump.  Currently at 1.6 units per hour with bolus dosing per pump.  Maintain anti-hyperglycemic dose as follows- continue home dose insulin pump.  If uncontrolled would DC his insulin pump and start basal and bolus dosing  Antihyperglycemics (From admission, onward)      Start     Stop Route Frequency Ordered    01/03/25 1930  insulin regular insulin pump from home        Question Answer Comment   Target number 150    Basal Rate #1 5    Basal rate #1 time 1730        -- SubQ Continuous 01/03/25 1829          Hold Oral hypoglycemics while patient is in the hospital.    Chronic diastolic congestive heart failure  Patient has Diastolic (HFpEF) heart failure that is Chronic. On presentation their CHF was well compensated. Most recent BNP and echo results are listed below.  No results for input(s): "BNP" in the last 72 hours.  Latest ECHO  Results for orders placed during the hospital encounter of 10/12/23    Echo    Interpretation Summary    Left Ventricle: The left ventricle is normal in size. Normal wall thickness. Septal motion is consistent with post-operative status. There is low normal systolic function with a visually estimated ejection fraction of 50 - 55%. There is normal diastolic function.    Right Ventricle: Normal right ventricular cavity size. Wall thickness is normal. Right ventricle wall motion  is normal. Systolic function is normal.    Pulmonary Artery: The estimated pulmonary artery systolic pressure is 21 mmHg.    IVC/SVC: Normal venous pressure at 3 mmHg.    Current Heart Failure Medications  torsemide tablet 20 mg, Daily, Oral  , 2 times daily, Oral  metoprolol succinate (TOPROL-XL) 24 hr tablet 50 mg, 2 times daily, Oral    Plan  - Monitor strict I&Os and daily weights.    - Place on telemetry  - Low " sodium diet  - Place on fluid restriction of 2 L.   - Cardiology has not been consulted  - The patient's volume status is at their baseline  - continue home meds; patient has improved ejection fraction        Class 3 obesity  -diet and exercise   -could consider GLP 1 receptor agonist in future      Paroxysmal atrial fibrillation  Patient has paroxysmal (<7 days) atrial fibrillation. Patient is currently in sinus rhythm. [id number] Score: 3. The patients heart rate in the last 24 hours is as follows:  Pulse  Min: 70  Max: 80     Antiarrhythmics  , 2 times daily, Oral  metoprolol succinate (TOPROL-XL) 24 hr tablet 50 mg, 2 times daily, Oral    Anticoagulants       Plan  - Replete lytes with a goal of K>4, Mg >2  - Patient is not anticoagulated due to patient refusal  - Patient's afib is currently controlled        Type 2 diabetes mellitus with diabetic neuropathy, with long-term current use of insulin  -on insulin pump  -continue statin      Sleep apnea  - CPAP qhs      Coronary artery disease due to calcified coronary lesion  Patient with known CAD s/p stent placement and CABG, which is controlled Will continue ASA, Plavix, and Statin and monitor for S/Sx of angina/ACS. Continue to monitor on telemetry.       VTE Risk Mitigation (From admission, onward)           Ordered     IP VTE HIGH RISK PATIENT  Once         01/03/25 1649     Place sequential compression device  Until discontinued         01/03/25 1649                    Discharge Planning   CAMILLE:      Code Status: Full Code   Medical Readiness for Discharge Date:                            Mohinder Jimenes MD  Department of Hospital Medicine   [facility]

## 2025-01-04 NOTE — PROGRESS NOTES
"Pharmacokinetic Initial Assessment: IV Vancomycin    Assessment/Plan:    Initiate intravenous vancomycin with loading dose of 2000 mg once with subsequent doses when random concentrations are less than 20 mcg/mL  Desired empiric serum trough concentration is 10 to 15 mcg/mL  Draw vancomycin random level on 1-4 at 18:30.  Pharmacy will continue to follow and monitor vancomycin.      Please contact pharmacy at extension [phone] with any questions regarding this assessment.     Thank you for the consult,   Alfredo MCCLAIN Nba       Patient brief summary:  Jian Arrieta is a 69 y.o. male initiated on antimicrobial therapy with IV Vancomycin for treatment of suspected bone/joint infection    Drug Allergies:   Review of patient's allergies indicates:  No Known Allergies    Actual Body Weight:   108.4 kg    Renal Function:   Estimated Creatinine Clearance: 21.8 mL/min (A) (based on SCr of 3.7 mg/dL (H)).,     Dialysis Method (if applicable):  N/A    CBC (last 72 hours):  No results for input(s): "WHITE BLOOD CELL COUNT", "HEMOGLOBIN", "HEMATOCRIT", "PLATELETS", "GRAN%", "LYMPH%", "MONO%", "EOSINOPHIL%", "BASOPHIL%", "DIFFERENTIAL METHOD" in the last 72 hours.    Metabolic Panel (last 72 hours):  Recent Labs   Lab Result Units 01/03/25  1133   Sodium mmol/L 133*   Potassium mmol/L 4.2   Chloride mmol/L 96   CO2 mmol/L 28   Glucose mg/dL 432*   BUN mg/dL 47*   Creatinine mg/dL 3.7*       Drug levels (last 3 results):  No results for input(s): "VANCOMYCINRA", "VANCORANDOM", "VANCOMYCINPE", "VANCOPEAK", "VANCOMYCINTR", "VANCOTROUGH" in the last 72 hours.    Microbiologic Results:  Microbiology Results (last 7 days)       Procedure Component Value Units Date/Time    AFB Culture & Smear [[id number]] Collected: 01/03/25 1536    Order Status: Sent Specimen: Incision site from Toe, Left Foot Updated: 01/03/25 1548    Fungus culture [[id number]] Collected: 01/03/25 1536    Order Status: Sent Specimen: Incision site from Toe, Left Foot " Updated: 01/03/25 1548    Culture, Anaerobe [[id number]] Collected: 01/03/25 1536    Order Status: Sent Specimen: Incision site from Toe, Left Foot Updated: 01/03/25 1548    Aerobic culture [[id number]] Collected: 01/03/25 1536    Order Status: Sent Specimen: Incision site from Toe, Left Foot Updated: 01/03/25 1547    Gram stain [[id number]] Collected: 01/03/25 1536    Order Status: Sent Specimen: Incision site from Toe, Left Foot Updated: 01/03/25 1547

## 2025-01-04 NOTE — ASSESSMENT & PLAN NOTE
"Patient has Diastolic (HFpEF) heart failure that is Chronic. On presentation their CHF was well compensated. Most recent BNP and echo results are listed below.  No results for input(s): "BNP" in the last 72 hours.  Latest ECHO  Results for orders placed during the hospital encounter of 10/12/23    Echo    Interpretation Summary    Left Ventricle: The left ventricle is normal in size. Normal wall thickness. Septal motion is consistent with post-operative status. There is low normal systolic function with a visually estimated ejection fraction of 50 - 55%. There is normal diastolic function.    Right Ventricle: Normal right ventricular cavity size. Wall thickness is normal. Right ventricle wall motion  is normal. Systolic function is normal.    Pulmonary Artery: The estimated pulmonary artery systolic pressure is 21 mmHg.    IVC/SVC: Normal venous pressure at 3 mmHg.    Current Heart Failure Medications  torsemide tablet 20 mg, Daily, Oral  , 2 times daily, Oral  metoprolol succinate (TOPROL-XL) 24 hr tablet 50 mg, 2 times daily, Oral    Plan  - Monitor strict I&Os and daily weights.    - Place on telemetry  - Low sodium diet  - Place on fluid restriction of 2 L.   - Cardiology has not been consulted  - The patient's volume status is at their baseline  - continue home meds; patient has improved ejection fraction      "

## 2025-01-04 NOTE — NURSING
PT'S IV ACCESS LEAKING.  THIS NURSE (RM) EXPLAINED TO PT THAT IV ACCESS IS NO LONGER GOOD AND THAT ANOTHER IV WILL HAVE TO BE INSERTED.  THIS NURSE (RM) ALSO EXPLAINED TO PT AS LONG AS IV IS LEAKING PT IS NOT RECEIVING IV ANTIBIOTIC.  PT DISCONNECTED FROM IV ACCESS.  THIS NURSE (RM) OFFERED TO CALL ICU NURSE WITH ULTRASOUND EQUIPMENT TO REGAIN IV.  PT REFUSED TO ALLOW NURSING STAFF TO REGAIN IV ACCESS.  PT REQUESTING SLEEPING AND PAIN MEDICATION.  ON CALL PROVIDER (NG) NOTIFIED BY THIS NURSE (SANTOS).

## 2025-01-04 NOTE — NURSING
Pt arrived to unit from PACU with insulin pump and dexcom in place with no issues/abnormalities noted. NP reviewed with pt at bedside. Orders placed for home insulin pump use. Pt signed contract with this RN regarding home insulin pump use, provided with sheets to record intake and bolus dosing.

## 2025-01-04 NOTE — CONSULTS
[facility]  Consult Note    Patient Name: Jian Arrieta  MRN: [id number]  Admission Date: 1/3/2025  Hospital Length of Stay: 0 days  Attending Physician: Mohinder Jimenes,*  Primary Care Provider: Reza Boyer MD     Inpatient consult to Podiatry  Consult performed by: Fernie Hudson MD  Consult ordered by: Chapo Neal NP  Reason for consult: s/p L 2nd partial ray amp        Subjective:     History of Present Illness:  69M PMH HTN, CAD, CHF, DM, CKD, PVD, obesity, admitted s/p L 2nd partial ray amputation. Pt presented to hospital for elective amputation of L 2nd digit due to ischemic changes, dry gangrene of distal toe. Pt presented to pre op evaluation with signs of active infection L 2nd digit and POCT glucose > 490. Pt endorses increased erythema, edema, drainage and new purulence from affected toe over the last 2 weeks. Pt states glucose monitor was not working over night. Pt denies any f/c/n/v/sob or any other pedal complaints. Admitted as inpatient after proceeding with surgery for infection and glucose management. No complications occurred during operation. Podiatry consulted s/p partial 2nd metatarsal amputation.     Scheduled Meds:   [START ON 1/4/2025] aspirin  81 mg Oral Daily    atorvastatin  80 mg Oral QHS    [START ON 1/4/2025] clopidogreL  75 mg Oral Daily    [START ON 1/4/2025] isosorbide mononitrate  30 mg Oral Daily    mirtazapine  7.5 mg Oral QHS    piperacillin-tazobactam (Zosyn) IV (PEDS and ADULTS) (extended infusion is not appropriate)  4.5 g Intravenous Q12H    [START ON 1/4/2025] torsemide  20 mg Oral Daily    vancomycin (VANCOCIN) IV (PEDS and ADULTS)  2,000 mg Intravenous Once     Continuous Infusions:   insulin regular  1.6 Units/hr Subcutaneous Continuous         PRN Meds:  Current Facility-Administered Medications:     dextrose 50%, 12.5 g, Intravenous, PRN    dextrose 50%, 12.5 g, Intravenous, PRN    dextrose 50%, 25 g, Intravenous, PRN     dextrose 50%, 25 g, Intravenous, PRN    glucagon (human recombinant), 1 mg, Intramuscular, PRN    glucagon (human recombinant), 1 mg, Intramuscular, PRN    glucose, 16 g, Oral, PRN    glucose, 16 g, Oral, PRN    glucose, 24 g, Oral, PRN    glucose, 24 g, Oral, PRN    naloxone, 0.02 mg, Intravenous, PRN    ondansetron, 4 mg, Intravenous, Q8H PRN    oxyCODONE-acetaminophen, 1 tablet, Oral, Q6H PRN    sodium chloride 0.9%, 10 mL, Intravenous, Q12H PRN    Pharmacy to dose Vancomycin consult, , , Once **AND** vancomycin - pharmacy to dose, , Intravenous, pharmacy to manage frequency    Review of patient's allergies indicates:  No Known Allergies     Past Medical History:   Diagnosis Date    Alcohol abuse     Anasarca 1/28/2019    Anemia     Anticoagulant long-term use     Arthropathy associated with neurological disorder 9/2/2015    Atherosclerosis     Charcot foot due to diabetes mellitus     Chronic combined systolic and diastolic heart failure 01/29/2019 1-28-19 Left VentricleModerate decreased ejection fraction at 30%. Normal left ventricular cavity size. Normal wall thickness observed. Grade I (mild) left ventricular diastolic dysfunction consistent with impaired relaxation. Normal left atrial pressure. Moderate, global hypokinesis(see wall scoring diagram). Right VentricleNormal cavity size, wall thickness and ejection fraction. Wall motion n    Chronic pancreatitis 1/28/2019    CKD (chronic kidney disease) stage 3, GFR 30-59 ml/min     CKD (chronic kidney disease) stage 4, GFR 15-29 ml/min     Colon polyps     approx 5 yrs ago    Coronary artery disease due to calcified coronary lesion 05/08/2015    5 stents on ASA      Diabetic polyneuropathy associated with type 2 diabetes mellitus 9/2/2015    Diverticulosis 1/28/2019    DM type 2 with diabetic peripheral neuropathy 2/4/2019    Encounter for blood transfusion     Essential hypertension 1/28/2019    Former smoker 8/26/2015    Healed ulcer of left foot on  examination 6/20/2017    Hematuria     Hydrocele     approx 1.5 yrs ago    Hyperphosphatemia     Hypoalbuminemia 2/4/2019    Hypocalcemia     Lymphedema of both lower extremities 1/29/2019    Mixed hyperlipidemia 5/8/2015    Morbid obesity with BMI of 50.0-59.9, adult 5/8/2015    Obstruction of right ureteropelvic junction (UPJ) due to stone 5/24/2021    Onychomycosis of multiple toenails with type 2 diabetes mellitus and peripheral neuropathy 6/20/2017    Perianal cyst     approx 2 yrs ago    Proteinuria     Pseudocyst of pancreas 1/28/2019 1-28-19 Liver has a cirrhotic morphology with no focal lesions.  Significant interval increase in ascites when compared to prior exam which may account for patient's abdominal distension.  Hypodense air-fluid collection along the body of the pancreas which is slightly smaller when compared to prior CT.  Findings may relate to pancreatic necrosis with pancreatic pseudocysts with infected pseudocyst    Skin cancer     skin cancer    Sleep apnea 8/26/2015    Status post bariatric surgery 1/11/2016    Type 2 diabetes mellitus, with long-term current use of insulin 5/8/2015    Urinary tract infection      Past Surgical History:   Procedure Laterality Date    ANGIOGRAPHY N/A 6/28/2019    Procedure: ANGIOGRAM-PV STENT;  Surgeon: wEa Diagnostic Provider;  Location: [facility] OR;  Service: Radiology;  Laterality: N/A;    ANGIOPLASTY      total x5 stents    AORTOGRAPHY WITH SERIALOGRAPHY N/A 11/26/2024    Procedure: AORTOGRAM, WITH SERIALOGRAPHY;  Surgeon: Clinton Gibbs MD;  Location: [facility] CATH LAB/EP;  Service: Cardiology;  Laterality: N/A;    COLONOSCOPY N/A 10/6/2015    Procedure: COLONOSCOPY;  Surgeon: Shekhar Richards MD;  Location: [facility] ENDO (2ND FLR);  Service: Endoscopy;  Laterality: N/A;  BMI over 55/2nd floor case    5 day hold Plavix, Dr Kwadwo Arroyo    COLONOSCOPY N/A 5/13/2021    Procedure: COLONOSCOPY;  Surgeon: Huan Brumfield MD;  Location: [facility] ENDO;  Service: Endoscopy;   Laterality: N/A;    CORONARY ANGIOGRAPHY Right 3/20/2019    Procedure: ANGIOGRAM, CORONARY ARTERY;  Surgeon: Bob Duque MD;  Location: [facility] CATH LAB;  Service: Cardiology;  Laterality: Right;    CORONARY ARTERY BYPASS GRAFT  2017    x3    CORONARY BYPASS GRAFT ANGIOGRAPHY  3/20/2019    Procedure: Bypass graft study;  Surgeon: Bob Duque MD;  Location: [facility] CATH LAB;  Service: Cardiology;;    CYST REMOVAL      CYSTOSCOPY Right 6/30/2021    Procedure: CYSTOSCOPY;  Surgeon: William Diaz MD;  Location: [facility] OR 1ST FLR;  Service: Urology;  Laterality: Right;    CYSTOSCOPY N/A 10/16/2023    Procedure: CYSTOSCOPY;  Surgeon: Chrystal Dias MD;  [facility];  Service: Urology;  Laterality: N/A;    CYSTOSCOPY N/A 12/6/2023    Procedure: CYSTOSCOPY;  Surgeon: William Diaz MD;  [facility];  Service: Urology;  Laterality: N/A;    CYSTOSCOPY W/ URETERAL STENT PLACEMENT Right 6/16/2021    Procedure: CYSTOSCOPY, WITH URETERAL STENT INSERTION;  Surgeon: William Diaz MD;  Location: [facility] OR 2ND FLR;  Service: Urology;  Laterality: Right;  FLUORO LESS THAN 1 HOUR    ENDOSCOPIC ULTRASOUND OF UPPER GASTROINTESTINAL TRACT N/A 2/26/2020    Procedure: ULTRASOUND, UPPER GI TRACT, ENDOSCOPIC;  Surgeon: Robbie Yang MD;  Location: [facility];  Service: Endoscopy;  Laterality: N/A;    ESOPHAGOGASTRODUODENOSCOPY N/A 7/8/2019    Procedure: ESOPHAGOGASTRODUODENOSCOPY (EGD);  Surgeon: Huan Brumfield MD;  Location: [facility];  Service: Endoscopy;  Laterality: N/A;    ESOPHAGOGASTRODUODENOSCOPY N/A 5/13/2021    Procedure: EGD (ESOPHAGOGASTRODUODENOSCOPY);  Surgeon: Huan Brumfield MD;  Location: [facility];  Service: Endoscopy;  Laterality: N/A;    EXCISION OF HYDROCELE Bilateral 6/16/2021    Procedure: HYDROCELE REPAIR;  Surgeon: William Diaz MD;  Location: [facility] OR 2ND FLR;  Service: Urology;  Laterality: Bilateral;  2 HOURS    EXTRACTION - STONE Right 12/6/2023    Procedure:  EXTRACTION - STONE;  Surgeon: William Diaz MD;  Location: [facility] OR 1ST FLR;  Service: Urology;  Laterality: Right;    FLUOROSCOPY N/A 6/16/2021    Procedure: FLUOROSCOPY;  Surgeon: William Diaz MD;  Location: [facility] OR 2ND FLR;  Service: Urology;  Laterality: N/A;    GASTRECTOMY      INSERTION OF DIALYSIS CATHETER N/A 5/17/2019    Procedure: pleurx;  Surgeon: [facility] Diagnostic Provider;  Location: [facility] OR 2ND FLR;  Service: General;  Laterality: N/A;  Room 188/Sand    KNEE ARTHROSCOPY      LAPAROSCOPIC CHOLECYSTECTOMY N/A 5/4/2020    Procedure: CHOLECYSTECTOMY, LAPAROSCOPIC;  Surgeon: SON Rowe MD;  Location: [facility] OR;  Service: General;  Laterality: N/A;    LASER LITHOTRIPSY N/A 6/30/2021    Procedure: LITHOTRIPSY, USING LASER;  Surgeon: William Diaz MD;  Location: [facility] OR 1ST FLR;  Service: Urology;  Laterality: N/A;    LIVER BIOPSY N/A 5/4/2020    Procedure: BIOPSY, LIVER, Laproscopic ;  Surgeon: SON Rowe MD;  Location: [facility] OR;  Service: General;  Laterality: N/A;    perianal surgery      perianal cyst removed    PYELOSCOPY Right 6/30/2021    Procedure: PYELOSCOPY;  Surgeon: William Diaz MD;  Location: [facility] OR 1ST FLR;  Service: Urology;  Laterality: Right;    PYELOSCOPY Right 10/16/2023    Procedure: PYELOSCOPY;  Surgeon: Chrystal Dias MD;  Location: [facility] OR 1ST FLR;  Service: Urology;  Laterality: Right;    PYELOSCOPY Right 12/6/2023    Procedure: PYELOSCOPY;  Surgeon: William Diaz MD;  Location: [facility] OR 1ST FLR;  Service: Urology;  Laterality: Right;    REMOVAL-STENT Right 12/6/2023    Procedure: REMOVAL-STENT;  Surgeon: William Diaz MD;  Location: [facility] OR 1ST FLR;  Service: Urology;  Laterality: Right;    REPLACEMENT OF STENT Right 6/30/2021    Procedure: REPLACEMENT, STENT;  Surgeon: William Diaz MD;  Location: [facility] OR 1ST FLR;  Service: Urology;  Laterality: Right;    RETROGRADE PYELOGRAPHY Right 10/16/2023    Procedure: PYELOGRAM, RETROGRADE;  Surgeon:  Chrystal Dias MD;  [facility];  Service: Urology;  Laterality: Right;    RETROGRADE PYELOGRAPHY Right 2023    Procedure: PYELOGRAM, RETROGRADE;  Surgeon: William Diaz MD;  [facility];  Service: Urology;  Laterality: Right;    URETERAL STENT PLACEMENT Right 10/16/2023    Procedure: INSERTION, STENT, URETER;  Surgeon: Chrystal Dias MD;  Location: [facility] OR [facility] FLR;  Service: Urology;  Laterality: Right;    URETEROSCOPY Right 2021    Procedure: URETEROSCOPY FLEXIBLE URETEROSCOPE;  Surgeon: William Diaz MD;  Location: [facility] OR [facility] FLR;  Service: Urology;  Laterality: Right;    URETEROSCOPY Right 10/16/2023    Procedure: URETEROSCOPY;  Surgeon: Chrystal Dias MD;  [facility];  Service: Urology;  Laterality: Right;    URETEROSCOPY Right 2023    Procedure: URETEROSCOPY;  Surgeon: William Diaz MD;  [facility];  Service: Urology;  Laterality: Right;       Family History       Problem Relation (Age of Onset)    Cancer Mother, Father, Paternal Grandfather, Brother    Diabetes Maternal Grandmother    Heart disease Father    No Known Problems Paternal Grandmother    Obesity Sister    Parkinsonism Brother    Stroke Maternal Grandfather          Tobacco Use    Smoking status: Former     Current packs/day: 0.00     Average packs/day: 2.0 packs/day for 30.0 years (60.0 ttl pk-yrs)     Types: Cigarettes     Start date: 1975     Quit date: 2005     Years since quittin.9    Smokeless tobacco: Never   Substance and Sexual Activity    Alcohol use: No     Comment: started ~, reports 1 shot daily, max 3 shots daily, vague about alcohol consumption. Last drink 2018    Drug use: No    Sexual activity: Not on file     Review of Systems   Constitutional: Negative.    HENT:  Positive for hearing loss.    Respiratory: Negative.     Cardiovascular:  Positive for leg swelling.   Gastrointestinal: Negative.   "  Genitourinary: Negative.    Musculoskeletal:  Positive for joint swelling.   Skin:  Positive for color change and wound.   Neurological:  Positive for numbness.   Psychiatric/Behavioral: Negative.       Objective:     Vital Signs (Most Recent):  Temp: 97.9 °F (36.6 °C) (01/03/25 1949)  Pulse: 77 (01/03/25 1949)  Resp: 20 (01/03/25 1949)  BP: 116/73 (01/03/25 1949)  SpO2: 100 % (01/03/25 1949) Vital Signs (24h Range):  Temp:  [97.9 °F (36.6 °C)-98.4 °F (36.9 °C)] 97.9 °F (36.6 °C)  Pulse:  [70-77] 77  Resp:  [9-22] 20  SpO2:  [96 %-100 %] 100 %  BP: (105-131)/(53-73) 116/73     Weight: 108.4 kg (238 lb 15.7 oz)  Body mass index is 37.43 kg/m².    Foot Exam    General  Orientation: alert and oriented to person, place, and time   Affect: appropriate       Left Foot/Ankle      Inspection and Palpation  Tenderness: none   Swelling: (diffuse LLE, increased 2nd digit)  Skin Exam: drainage, maceration, cellulitis, fissure, skin changes, abnormal color and erythema; no blister     Neurovascular  Dorsalis pedis: absent  Posterior tibial: absent  Saphenous nerve sensation: diminished  Tibial nerve sensation: diminished  Superficial peroneal nerve sensation: diminished  Deep peroneal nerve sensation: diminished  Sural nerve sensation: diminished    Tests  Osmel's sign: negative    Comments  Previously dry and stable eschar at distal aspect of 2nd digit now presents with moderate seropurulent drainage, periwound maceration, noted malodor, increased erythema and edema 2nd digit, and eschar cap at distal end of digit. No pain crepitus or fluctuance on palpation.                   Laboratory:  A1C:   Recent Labs   Lab 11/15/24  0956   HGBA1C 7.9*     CBC: No results for input(s): "WBC", "RBC", "HGB", "HCT", "PLT", "MCV", "MCH", "MCHC" in the last 168 hours.  CMP:   Recent Labs   Lab 01/03/25  1133   *   CALCIUM 7.9*   *   K 4.2   CO2 28   CL 96   BUN 47*   CREATININE 3.7*     CRP: No results for input(s): "CRP" in " "the last 168 hours.  ESR: No results for input(s): "SEDRATE" in the last 168 hours.  Microbiology Results (last 7 days)       Procedure Component Value Units Date/Time    Aerobic culture [[id number]] Collected: 01/03/25 1536    Order Status: Sent Specimen: Incision site from Toe, Left Foot Updated: 01/03/25 2012    Fungus culture [[id number]] Collected: 01/03/25 1536    Order Status: Sent Specimen: Incision site from Toe, Left Foot Updated: 01/03/25 2012    AFB Culture & Smear [[id number]] Collected: 01/03/25 1536    Order Status: Sent Specimen: Incision site from Toe, Left Foot Updated: 01/03/25 2012    Gram stain [[id number]] Collected: 01/03/25 1536    Order Status: Sent Specimen: Incision site from Toe, Left Foot Updated: 01/03/25 2012    Culture, Anaerobe [[id number]] Collected: 01/03/25 1536    Order Status: Sent Specimen: Incision site from Toe, Left Foot Updated: 01/03/25 2012          Specimen (24h ago, onward)       Start     Ordered    01/03/25 1535  Specimen to Pathology, Surgery Orthopedics  Once        Comments: Pre-op Diagnosis: Gangrene [I96]Procedure(s):AMPUTATION, TOE Number of specimens: 2Name of specimens: 1. Left 2nd toe- perm 2. Proximal margin     References:    Click here for ordering Quick Tip   Question Answer Comment   Procedure Type: Orthopedics    Release to patient Immediate        01/03/25 1535                    Diagnostic Results:  I have reviewed all pertinent imaging results/findings within the past 24 hours.    Assessment/Plan:     Endocrine  * Type 2 diabetes mellitus with left diabetic foot infection  S/p L 2nd partial ray amputation DOS 1/3/25    - incision site left partially open proximally and packed with betadine soaked 1/4 inch packing strip, dressed with xeroform, gauze, cast padding, ACE compression bandage   - podiatry will change wound packing POD1   - pt instructed to keep dressing CDI  - post op xrays ordered, pending results   - consider MRI if suspicion for " continued infection  - intra op deep wound cx sent for microbiology, pending results  - intra op specimen sent for pathology, pending results  - abx management per primary / ID, tailor to cx results as necessary  - glucose management per primary  - WBAT LLE, emphasis to heel touch in post op shoe  - podiatry will follow        Thank you for your consult. I will follow-up with patient. Please contact us if you have any additional questions.    Fernie Hudson MD  Podiatry  [facility]

## 2025-01-04 NOTE — ASSESSMENT & PLAN NOTE
Patient has paroxysmal (<7 days) atrial fibrillation. Patient is currently in sinus rhythm. [id number] Score: 3. The patients heart rate in the last 24 hours is as follows:  Pulse  Min: 70  Max: 80     Antiarrhythmics  , 2 times daily, Oral  metoprolol succinate (TOPROL-XL) 24 hr tablet 50 mg, 2 times daily, Oral    Anticoagulants       Plan  - Replete lytes with a goal of K>4, Mg >2  - Patient is not anticoagulated due to patient refusal  - Patient's afib is currently controlled

## 2025-01-05 LAB
ALBUMIN SERPL BCP-MCNC: 2.1 G/DL (ref 3.5–5.2)
ALP SERPL-CCNC: 138 U/L (ref 40–150)
ALT SERPL W/O P-5'-P-CCNC: 52 U/L (ref 10–44)
ANION GAP SERPL CALC-SCNC: 9 MMOL/L (ref 8–16)
AST SERPL-CCNC: 59 U/L (ref 10–40)
BASOPHILS # BLD AUTO: 0.03 K/UL (ref 0–0.2)
BASOPHILS NFR BLD: 0.5 % (ref 0–1.9)
BILIRUB SERPL-MCNC: 0.3 MG/DL (ref 0.1–1)
BUN SERPL-MCNC: 43 MG/DL (ref 8–23)
CALCIUM SERPL-MCNC: 7.8 MG/DL (ref 8.7–10.5)
CHLORIDE SERPL-SCNC: 102 MMOL/L (ref 95–110)
CO2 SERPL-SCNC: 28 MMOL/L (ref 23–29)
CREAT SERPL-MCNC: 3.8 MG/DL (ref 0.5–1.4)
DIFFERENTIAL METHOD BLD: ABNORMAL
EOSINOPHIL # BLD AUTO: 0.3 K/UL (ref 0–0.5)
EOSINOPHIL NFR BLD: 4.4 % (ref 0–8)
ERYTHROCYTE [DISTWIDTH] IN BLOOD BY AUTOMATED COUNT: 14.2 % (ref 11.5–14.5)
EST. GFR  (NO RACE VARIABLE): 16 ML/MIN/1.73 M^2
GLUCOSE SERPL-MCNC: 115 MG/DL (ref 70–110)
GLUCOSE SERPL-MCNC: 185 MG/DL (ref 70–110)
GLUCOSE SERPL-MCNC: 188 MG/DL (ref 70–110)
GLUCOSE SERPL-MCNC: 82 MG/DL (ref 70–110)
HCT VFR BLD AUTO: 28.9 % (ref 40–54)
HGB BLD-MCNC: 9.2 G/DL (ref 14–18)
IMM GRANULOCYTES # BLD AUTO: 0.01 K/UL (ref 0–0.04)
IMM GRANULOCYTES NFR BLD AUTO: 0.2 % (ref 0–0.5)
LYMPHOCYTES # BLD AUTO: 2 K/UL (ref 1–4.8)
LYMPHOCYTES NFR BLD: 31.6 % (ref 18–48)
MCH RBC QN AUTO: 29.5 PG (ref 27–31)
MCHC RBC AUTO-ENTMCNC: 31.8 G/DL (ref 32–36)
MCV RBC AUTO: 93 FL (ref 82–98)
MONOCYTES # BLD AUTO: 0.5 K/UL (ref 0.3–1)
MONOCYTES NFR BLD: 7.5 % (ref 4–15)
NEUTROPHILS # BLD AUTO: 3.6 K/UL (ref 1.8–7.7)
NEUTROPHILS NFR BLD: 55.8 % (ref 38–73)
NRBC BLD-RTO: 0 /100 WBC
PLATELET # BLD AUTO: 179 K/UL (ref 150–450)
PMV BLD AUTO: 10.6 FL (ref 9.2–12.9)
POCT GLUCOSE: 120 MG/DL (ref 70–110)
POCT GLUCOSE: 159 MG/DL (ref 70–110)
POCT GLUCOSE: 180 MG/DL (ref 70–110)
POCT GLUCOSE: 220 MG/DL (ref 70–110)
POCT GLUCOSE: 260 MG/DL (ref 70–110)
POTASSIUM SERPL-SCNC: 4.9 MMOL/L (ref 3.5–5.1)
PROT SERPL-MCNC: 6.7 G/DL (ref 6–8.4)
RBC # BLD AUTO: 3.12 M/UL (ref 4.6–6.2)
SODIUM SERPL-SCNC: 139 MMOL/L (ref 136–145)
VANCOMYCIN SERPL-MCNC: 24.1 UG/ML
WBC # BLD AUTO: 6.43 K/UL (ref 3.9–12.7)

## 2025-01-05 PROCEDURE — 97161 PT EVAL LOW COMPLEX 20 MIN: CPT

## 2025-01-05 PROCEDURE — 25000003 PHARM REV CODE 250: Performed by: HOSPITALIST

## 2025-01-05 PROCEDURE — 85025 COMPLETE CBC W/AUTO DIFF WBC: CPT | Performed by: REGISTERED NURSE

## 2025-01-05 PROCEDURE — 94660 CPAP INITIATION&MGMT: CPT

## 2025-01-05 PROCEDURE — 99232 SBSQ HOSP IP/OBS MODERATE 35: CPT | Mod: ,,, | Performed by: INTERNAL MEDICINE

## 2025-01-05 PROCEDURE — 97535 SELF CARE MNGMENT TRAINING: CPT

## 2025-01-05 PROCEDURE — 27000190 HC CPAP FULL FACE MASK W/VALVE

## 2025-01-05 PROCEDURE — 36415 COLL VENOUS BLD VENIPUNCTURE: CPT | Performed by: HOSPITALIST

## 2025-01-05 PROCEDURE — 80202 ASSAY OF VANCOMYCIN: CPT | Performed by: HOSPITALIST

## 2025-01-05 PROCEDURE — 97530 THERAPEUTIC ACTIVITIES: CPT

## 2025-01-05 PROCEDURE — 94761 N-INVAS EAR/PLS OXIMETRY MLT: CPT

## 2025-01-05 PROCEDURE — 11000001 HC ACUTE MED/SURG PRIVATE ROOM

## 2025-01-05 PROCEDURE — 25000003 PHARM REV CODE 250: Performed by: REGISTERED NURSE

## 2025-01-05 PROCEDURE — 99900035 HC TECH TIME PER 15 MIN (STAT)

## 2025-01-05 PROCEDURE — 36410 VNPNXR 3YR/> PHY/QHP DX/THER: CPT

## 2025-01-05 PROCEDURE — 97116 GAIT TRAINING THERAPY: CPT

## 2025-01-05 PROCEDURE — C1751 CATH, INF, PER/CENT/MIDLINE: HCPCS

## 2025-01-05 PROCEDURE — 36415 COLL VENOUS BLD VENIPUNCTURE: CPT | Performed by: REGISTERED NURSE

## 2025-01-05 PROCEDURE — 63600175 PHARM REV CODE 636 W HCPCS: Performed by: REGISTERED NURSE

## 2025-01-05 PROCEDURE — 80053 COMPREHEN METABOLIC PANEL: CPT | Performed by: REGISTERED NURSE

## 2025-01-05 RX ORDER — SODIUM CHLORIDE 0.9 % (FLUSH) 0.9 %
10 SYRINGE (ML) INJECTION EVERY 12 HOURS PRN
Status: DISCONTINUED | OUTPATIENT
Start: 2025-01-05 | End: 2025-01-06 | Stop reason: HOSPADM

## 2025-01-05 RX ADMIN — METOPROLOL SUCCINATE 50 MG: 50 TABLET, EXTENDED RELEASE ORAL at 09:01

## 2025-01-05 RX ADMIN — MIRTAZAPINE 7.5 MG: 7.5 TABLET, FILM COATED ORAL at 09:01

## 2025-01-05 RX ADMIN — CALCITRIOL CAPSULES 0.25 MCG 0.25 MCG: 0.25 CAPSULE ORAL at 09:01

## 2025-01-05 RX ADMIN — ASPIRIN 81 MG: 81 TABLET, COATED ORAL at 09:01

## 2025-01-05 RX ADMIN — ATORVASTATIN CALCIUM 80 MG: 40 TABLET, FILM COATED ORAL at 09:01

## 2025-01-05 RX ADMIN — PIPERACILLIN SODIUM AND TAZOBACTAM SODIUM 4.5 G: 4; .5 INJECTION, POWDER, LYOPHILIZED, FOR SOLUTION INTRAVENOUS at 12:01

## 2025-01-05 RX ADMIN — ISOSORBIDE MONONITRATE 30 MG: 30 TABLET, EXTENDED RELEASE ORAL at 09:01

## 2025-01-05 RX ADMIN — CLOPIDOGREL BISULFATE 75 MG: 75 TABLET ORAL at 09:01

## 2025-01-05 RX ADMIN — TORSEMIDE 20 MG: 20 TABLET ORAL at 09:01

## 2025-01-05 NOTE — ASSESSMENT & PLAN NOTE
Patient has paroxysmal (<7 days) atrial fibrillation. Patient is currently in sinus rhythm. [id number] Score: 3. The patients heart rate in the last 24 hours is as follows:  Pulse  Min: 61  Max: 82     Antiarrhythmics  , 2 times daily, Oral  metoprolol succinate (TOPROL-XL) 24 hr tablet 50 mg, 2 times daily, Oral    Anticoagulants       Plan  - Replete lytes with a goal of K>4, Mg >2  - Patient is not anticoagulated due to patient refusal  - Patient's afib is currently controlled

## 2025-01-05 NOTE — ASSESSMENT & PLAN NOTE
"Patient has Diastolic (HFpEF) heart failure that is Chronic. On presentation their CHF was well compensated. Most recent BNP and echo results are listed below.  No results for input(s): "BNP" in the last 72 hours.  Latest ECHO  Results for orders placed during the hospital encounter of 10/12/23    Echo    Interpretation Summary    Left Ventricle: The left ventricle is normal in size. Normal wall thickness. Septal motion is consistent with post-operative status. There is low normal systolic function with a visually estimated ejection fraction of 50 - 55%. There is normal diastolic function.    Right Ventricle: Normal right ventricular cavity size. Wall thickness is normal. Right ventricle wall motion  is normal. Systolic function is normal.    Pulmonary Artery: The estimated pulmonary artery systolic pressure is 21 mmHg.    IVC/SVC: Normal venous pressure at 3 mmHg.    Current Heart Failure Medications  torsemide tablet 20 mg, Daily, Oral  , 2 times daily, Oral  metoprolol succinate (TOPROL-XL) 24 hr tablet 50 mg, 2 times daily, Oral    Plan  - Monitor strict I&Os and daily weights.    - Place on telemetry  - Low sodium diet  - Place on fluid restriction of 2 L.   - Cardiology has not been consulted  - The patient's volume status is at their baseline  - continue home meds; patient has improved ejection fraction      "

## 2025-01-05 NOTE — SUBJECTIVE & OBJECTIVE
Interval History:  Reports that he did not sleep well overnight due to people needing they get in and out of his room.  Will place delirium precautions.  Otherwise no acute complaints.  No fevers or chills.  Awaiting finalization of antibiotic recommendations    Review of Systems  Objective:     Vital Signs (Most Recent):  Temp: 98.5 °F (36.9 °C) (01/05/25 0737)  Pulse: 61 (01/05/25 0819)  Resp: 18 (01/05/25 0737)  BP: (!) 90/55 (01/05/25 0737)  SpO2: 96 % (01/05/25 0819) Vital Signs (24h Range):  Temp:  [98.2 °F (36.8 °C)-98.9 °F (37.2 °C)] 98.5 °F (36.9 °C)  Pulse:  [61-82] 61  Resp:  [17-18] 18  SpO2:  [96 %-98 %] 96 %  BP: ()/(54-71) 90/55     Weight: 108.4 kg (238 lb 15.7 oz)  Body mass index is 37.43 kg/m².    Intake/Output Summary (Last 24 hours) at 1/5/2025 0956  Last data filed at 1/5/2025 0732  Gross per 24 hour   Intake 1098.61 ml   Output 1400 ml   Net -301.39 ml         Physical Exam  Constitutional:       Appearance: Normal appearance.   HENT:      Head: Normocephalic.      Mouth/Throat:      Mouth: Mucous membranes are moist.      Pharynx: Oropharynx is clear.   Eyes:      Pupils: Pupils are equal, round, and reactive to light.   Cardiovascular:      Rate and Rhythm: Normal rate and regular rhythm.      Pulses: Normal pulses.      Heart sounds: Normal heart sounds.   Pulmonary:      Effort: Pulmonary effort is normal.      Breath sounds: Normal breath sounds.   Abdominal:      General: Bowel sounds are normal.      Palpations: Abdomen is soft.   Musculoskeletal:         General: Normal range of motion.      Cervical back: Normal range of motion.   Skin:     General: Skin is warm and dry.      Capillary Refill: Capillary refill takes less than 2 seconds.      Comments: Left foot bandaged postoperatively   Neurological:      General: No focal deficit present.      Mental Status: He is alert and oriented to person, place, and time. Mental status is at baseline.   Psychiatric:         Mood and  Affect: Mood normal.         Behavior: Behavior normal.             Significant Labs: All pertinent labs within the past 24 hours have been reviewed.    Significant Imaging: I have reviewed all pertinent imaging results/findings within the past 24 hours.

## 2025-01-05 NOTE — SUBJECTIVE & OBJECTIVE
Subjective:     Interval History: NAEON, NAD, VSS. Patient is afebrile, dressings are clean/dry/ and intact.   New pedal complaints: None  New results: GPC on gram stain.   Denies post op fever.       Follow-up For: Procedure(s) (LRB):  AMPUTATION, TOE (Left)    Post-Operative Day: 1 Day Post-Op    Scheduled Meds:   aspirin  81 mg Oral Daily    atorvastatin  80 mg Oral QHS    calcitRIOL  0.25 mcg Oral Daily    clopidogreL  75 mg Oral Daily    isosorbide mononitrate  30 mg Oral Daily    metoprolol succinate  50 mg Oral BID    mirtazapine  7.5 mg Oral QHS    piperacillin-tazobactam (Zosyn) IV (PEDS and ADULTS) (extended infusion is not appropriate)  4.5 g Intravenous Q12H    torsemide  20 mg Oral Daily     Continuous Infusions:   insulin regular  1.6 Units/hr Subcutaneous Continuous 0.02 mL/hr at 01/03/25 1930 1.6 Units/hr at 01/03/25 1930     PRN Meds:  Current Facility-Administered Medications:     dextrose 50%, 12.5 g, Intravenous, PRN    dextrose 50%, 12.5 g, Intravenous, PRN    dextrose 50%, 25 g, Intravenous, PRN    dextrose 50%, 25 g, Intravenous, PRN    glucagon (human recombinant), 1 mg, Intramuscular, PRN    glucagon (human recombinant), 1 mg, Intramuscular, PRN    glucose, 16 g, Oral, PRN    glucose, 16 g, Oral, PRN    glucose, 24 g, Oral, PRN    glucose, 24 g, Oral, PRN    naloxone, 0.02 mg, Intravenous, PRN    ondansetron, 4 mg, Intravenous, Q8H PRN    oxyCODONE-acetaminophen, 1 tablet, Oral, Q6H PRN    sodium chloride 0.9%, 10 mL, Intravenous, Q12H PRN    Pharmacy to dose Vancomycin consult, , , Once **AND** vancomycin - pharmacy to dose, , Intravenous, pharmacy to manage frequency    Review of Systems   Constitutional:  Negative for chills and fever.   Respiratory:  Negative for shortness of breath.    Cardiovascular:  Negative for chest pain.   Gastrointestinal:  Negative for diarrhea, nausea and vomiting.   Skin:  Positive for wound.     Objective:     Vital Signs (Most Recent):  Temp: 98.2 °F (36.8  "°C) (01/04/25 1546)  Pulse: 82 (01/04/25 1546)  Resp: 18 (01/04/25 1546)  BP: 125/68 (01/04/25 1546)  SpO2: 98 % (01/04/25 1546) Vital Signs (24h Range):  Temp:  [97.5 °F (36.4 °C)-98.4 °F (36.9 °C)] 98.2 °F (36.8 °C)  Pulse:  [74-82] 82  Resp:  [17-20] 18  SpO2:  [96 %-100 %] 98 %  BP: (108-125)/(62-73) 125/68     Weight: 108.4 kg (238 lb 15.7 oz)  Body mass index is 37.43 kg/m².    Foot Exam    General  Orientation: alert and oriented to person, place, and time   Affect: appropriate       Left Foot/Ankle      Inspection and Palpation  Tenderness: none   Swelling: (diffuse LLE, increased 2nd digit)  Skin Exam: drainage and ulcer; no blister     Neurovascular  Dorsalis pedis: absent  Posterior tibial: absent  Saphenous nerve sensation: diminished  Tibial nerve sensation: diminished  Superficial peroneal nerve sensation: diminished  Deep peroneal nerve sensation: diminished  Sural nerve sensation: diminished    Tests  Osmel's sign: negative    Comments  S/p left foot 2nd digit amputation. Sutures clean dry and intact. Proximal aspect of incision left open. With sanguinous drainage noted on packing strip.   No malodor. No purulence noted on manual expression.            Laboratory:  A1C:   Recent Labs   Lab 11/15/24  0956 01/04/25  0637   HGBA1C 7.9* 9.7*     CBC:   Recent Labs   Lab 01/04/25  0637   WBC 6.03   RBC 2.98*   HGB 8.8*   HCT 27.3*      MCV 92   MCH 29.5   MCHC 32.2     CMP:   Recent Labs   Lab 01/04/25  0637   *   CALCIUM 7.7*   ALBUMIN 2.1*   PROT 6.4      K 4.1   CO2 28      BUN 42*   CREATININE 3.7*   ALKPHOS 153*   ALT 62*   AST 69*   BILITOT 0.3     CRP: No results for input(s): "CRP" in the last 168 hours.  ESR: No results for input(s): "SEDRATE" in the last 168 hours.  Wound Cultures: No results for input(s): "LABAERO" in the last 4320 hours.  Microbiology Results (last 7 days)       Procedure Component Value Units Date/Time    Gram stain [[id number]] Collected: 01/03/25 " 1536    Order Status: Completed Specimen: Incision site from Toe, Left Foot Updated: 01/04/25 0511     Gram Stain Result Rare WBC's      Moderate Gram positive cocci    Narrative:      Bone left second toe    Aerobic culture [[id number]] Collected: 01/03/25 1536    Order Status: Sent Specimen: Incision site from Toe, Left Foot Updated: 01/03/25 2012    Fungus culture [[id number]] Collected: 01/03/25 1536    Order Status: Sent Specimen: Incision site from Toe, Left Foot Updated: 01/03/25 2012    AFB Culture & Smear [[id number]] Collected: 01/03/25 1536    Order Status: Sent Specimen: Incision site from Toe, Left Foot Updated: 01/03/25 2012    Culture, Anaerobe [[id number]] Collected: 01/03/25 1536    Order Status: Sent Specimen: Incision site from Toe, Left Foot Updated: 01/03/25 2012          Specimen (24h ago, onward)      None            Diagnostic Results:  I have reviewed all pertinent imaging results/findings within the past 24 hours.

## 2025-01-05 NOTE — ASSESSMENT & PLAN NOTE
S/p L 2nd partial ray amputation DOS 1/3/25    - Left foot dressed, wound care orders to follow.  - Cultures pending, abx per primary.   - glucose management per primary  - WBAT LLE, emphasis to heel touch in post op shoe  - podiatry will follow    Future DC recs  - Patient to follow up with podiatry, podiatry to arrange.  - SNF or HH to change dressings 3x weekly as follows: Cleanse left foot with saline, then apply packing strip to proximal incision site, then gauze, kerlix, and ace wrap.  - WBAT to LLE in post op shoe  - Abx per primary.

## 2025-01-05 NOTE — PROGRESS NOTES
[facility] Medicine  Progress Note    Patient Name: Jian Arrieta  MRN: [id number]  Patient Class: IP- Inpatient   Admission Date: 1/3/2025  Length of Stay: 2 days  Attending Physician: Mohinder Jimenes,*  Primary Care Provider: Reza Boyer MD        Subjective     Principal Problem:Gangrene        HPI:  Patient is a 69-year-old male with a history of CKD 4, IDDM, PAF presented as direct admit from Podiatry.  Patient at home for left 2nd metatarsal amputation for gangrenous toe.  Patient was evaluated at bedside postoperatively denies any pain.  Patient has insulin pump which apparently came dislodged last night which is his reading for uncontrolled glucose this morning.  Patient will be admitted to Hospital Medicine we will start antibiotics consult podiatry and ID.      Overview/Hospital Course:  No notes on file    Interval History:  Reports that he did not sleep well overnight due to people needing they get in and out of his room.  Will place delirium precautions.  Otherwise no acute complaints.  No fevers or chills.  Awaiting finalization of antibiotic recommendations    Review of Systems  Objective:     Vital Signs (Most Recent):  Temp: 98.5 °F (36.9 °C) (01/05/25 0737)  Pulse: 61 (01/05/25 0819)  Resp: 18 (01/05/25 0737)  BP: (!) 90/55 (01/05/25 0737)  SpO2: 96 % (01/05/25 0819) Vital Signs (24h Range):  Temp:  [98.2 °F (36.8 °C)-98.9 °F (37.2 °C)] 98.5 °F (36.9 °C)  Pulse:  [61-82] 61  Resp:  [17-18] 18  SpO2:  [96 %-98 %] 96 %  BP: ()/(54-71) 90/55     Weight: 108.4 kg (238 lb 15.7 oz)  Body mass index is 37.43 kg/m².    Intake/Output Summary (Last 24 hours) at 1/5/2025 0956  Last data filed at 1/5/2025 0732  Gross per 24 hour   Intake 1098.61 ml   Output 1400 ml   Net -301.39 ml         Physical Exam  Constitutional:       Appearance: Normal appearance.   HENT:      Head: Normocephalic.      Mouth/Throat:      Mouth: Mucous membranes are moist.      Pharynx: Oropharynx is  clear.   Eyes:      Pupils: Pupils are equal, round, and reactive to light.   Cardiovascular:      Rate and Rhythm: Normal rate and regular rhythm.      Pulses: Normal pulses.      Heart sounds: Normal heart sounds.   Pulmonary:      Effort: Pulmonary effort is normal.      Breath sounds: Normal breath sounds.   Abdominal:      General: Bowel sounds are normal.      Palpations: Abdomen is soft.   Musculoskeletal:         General: Normal range of motion.      Cervical back: Normal range of motion.   Skin:     General: Skin is warm and dry.      Capillary Refill: Capillary refill takes less than 2 seconds.      Comments: Left foot bandaged postoperatively   Neurological:      General: No focal deficit present.      Mental Status: He is alert and oriented to person, place, and time. Mental status is at baseline.   Psychiatric:         Mood and Affect: Mood normal.         Behavior: Behavior normal.             Significant Labs: All pertinent labs within the past 24 hours have been reviewed.    Significant Imaging: I have reviewed all pertinent imaging results/findings within the past 24 hours.    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  Seen at Podiatry this morning and sent to hospital for OR    Now status post amputation, concern for mild area at base of medial great metatarsal.  Consult ID and Podiatry  Continue vancomycin and Zosyn renal dose for now  Pain control      CKD (chronic kidney disease) stage 4, GFR 15-29 ml/min  Creatine stable for now. BMP reviewed- noted Estimated Creatinine Clearance: 21.2 mL/min (A) (based on SCr of 3.8 mg/dL (H)). according to latest data. Based on current GFR, CKD stage is stage 4 - GFR 15-29.  Monitor UOP and serial BMP and adjust therapy as needed. Renally dose meds. Avoid nephrotoxic medications and procedures.    Type 2 diabetes mellitus with left diabetic foot infection  Patient's FSGs are  "uncontrolled due to hyperglycemia on current medication regimen.  Last A1c reviewed-   Lab Results   Component Value Date    HGBA1C 9.7 (H) 01/04/2025     Most recent fingerstick glucose reviewed-   Recent Labs   Lab 01/04/25 2033 01/04/25  2212 01/05/25  0226 01/05/25  0559   POCTGLUCOSE 143* 285* 220* 159*       Current correctional scale   home dosing insulin pump.  Currently at 1.6 units per hour with bolus dosing per pump.  Maintain anti-hyperglycemic dose as follows- continue home dose insulin pump.  If uncontrolled would DC his insulin pump and start basal and bolus dosing  Antihyperglycemics (From admission, onward)      Start     Stop Route Frequency Ordered    01/03/25 1930  insulin regular insulin pump from home        Question Answer Comment   Target number 150    Basal Rate #1 5    Basal rate #1 time 1730        -- SubQ Continuous 01/03/25 1829          Hold Oral hypoglycemics while patient is in the hospital.    Chronic diastolic congestive heart failure  Patient has Diastolic (HFpEF) heart failure that is Chronic. On presentation their CHF was well compensated. Most recent BNP and echo results are listed below.  No results for input(s): "BNP" in the last 72 hours.  Latest ECHO  Results for orders placed during the hospital encounter of 10/12/23    Echo    Interpretation Summary    Left Ventricle: The left ventricle is normal in size. Normal wall thickness. Septal motion is consistent with post-operative status. There is low normal systolic function with a visually estimated ejection fraction of 50 - 55%. There is normal diastolic function.    Right Ventricle: Normal right ventricular cavity size. Wall thickness is normal. Right ventricle wall motion  is normal. Systolic function is normal.    Pulmonary Artery: The estimated pulmonary artery systolic pressure is 21 mmHg.    IVC/SVC: Normal venous pressure at 3 mmHg.    Current Heart Failure Medications  torsemide tablet 20 mg, Daily, Oral  , 2 times " daily, Oral  metoprolol succinate (TOPROL-XL) 24 hr tablet 50 mg, 2 times daily, Oral    Plan  - Monitor strict I&Os and daily weights.    - Place on telemetry  - Low sodium diet  - Place on fluid restriction of 2 L.   - Cardiology has not been consulted  - The patient's volume status is at their baseline  - continue home meds; patient has improved ejection fraction        Class 3 obesity  -diet and exercise   -could consider GLP 1 receptor agonist in future      Paroxysmal atrial fibrillation  Patient has paroxysmal (<7 days) atrial fibrillation. Patient is currently in sinus rhythm. [id number] Score: 3. The patients heart rate in the last 24 hours is as follows:  Pulse  Min: 61  Max: 82     Antiarrhythmics  , 2 times daily, Oral  metoprolol succinate (TOPROL-XL) 24 hr tablet 50 mg, 2 times daily, Oral    Anticoagulants       Plan  - Replete lytes with a goal of K>4, Mg >2  - Patient is not anticoagulated due to patient refusal  - Patient's afib is currently controlled        Type 2 diabetes mellitus with diabetic neuropathy, with long-term current use of insulin  -on insulin pump  -continue statin      Sleep apnea  - refusing CPAP qhs      Coronary artery disease due to calcified coronary lesion  Patient with known CAD s/p stent placement and CABG, which is controlled Will continue ASA, Plavix, and Statin and monitor for S/Sx of angina/ACS. Continue to monitor on telemetry.       VTE Risk Mitigation (From admission, onward)           Ordered     IP VTE HIGH RISK PATIENT  Once         01/03/25 1649     Place sequential compression device  Until discontinued         01/03/25 1649                    Discharge Planning   [place]:      Code Status: Full Code   Medical Readiness for Discharge Date:                    Please place Justification for DME        Mohinder Jimenes MD  Department of Hospital Medicine   [facility]

## 2025-01-05 NOTE — ASSESSMENT & PLAN NOTE
Creatine stable for now. BMP reviewed- noted Estimated Creatinine Clearance: 21.2 mL/min (A) (based on SCr of 3.8 mg/dL (H)). according to latest data. Based on current GFR, CKD stage is stage 4 - GFR 15-29.  Monitor UOP and serial BMP and adjust therapy as needed. Renally dose meds. Avoid nephrotoxic medications and procedures.

## 2025-01-05 NOTE — CONSULTS
70 y.o. WHITE OR  male who presents for evaluation. Wife is present for the evaluation    He has not f/u with neurology since the admission for syncope. He was empirically treated for seizure. currently on keppra, he is not willing to f/u w neuro at this time and is just requesting that we keep him on med indefinitely     No cardiovascular complaints. He is not exercising but remains active with chores and lawn work. No claudication    No polyuria, polydipsia, nocturia, vision change or neurologic complaints. Not checking sugars regularly. Weight is stable.  Sees Dr. Som Oliveros      No gi or gu issues    LAST MEDICARE WELLNESS EXAM: 7/13/16, 7/20/17, 10/3/18    Past Medical History:   Diagnosis Date    Anemia     fe def w gi w/u Dr Toyin Aviles cell carcinoma     s/p resection Dr. Anthony Salcedo Chronic back pain inoperable Dr. Eboni Levine and Dr. Toñito Jalloh Chronic kidney disease 04/2017    saw Dr Babatunde Aguilar    Colon adenoma     and diverticulosis Dr OWUSU [facility] J[facility] 2014    Diabetes mellitus ([address]     microalbuminuria    DJD (degenerative joint disease)     Dyshidrotic eczema     Dyslipidemia     ED (erectile dysfunction)     FHx: heart disease     Gastritis     2014 Dr OWUSU [facility] [place] neg h pylori    GERD (gastroesophageal reflux disease) 2014    on EGD Dr OWUSU [facility]    Hypertension     Left renal artery stenosis ([address] 11/30/2017    f/u duplex 11/18 by vasc was negative    Meniere's disease     s/p surgery Dr Tim Gutiérrez    Overweight (BMI 25.0-29.9) 11/30/2017    PAD (peripheral artery disease) ([address]     Seizure ([address] 03/2019    possible Dr Gerard Gomez at [facility]; mri neg, EEG neg, on keppra    Syncope 02/2018    probably vasovagal [facility]     Past Surgical History:   Procedure Laterality Date    CARDIAC SURG PROCEDURE UNLIST  10/10    NST neg EF 68%    CARDIAC SURG PROCEDURE UNLIST  02/2018    echo nl LV, ef 62%, mild mr/tr/pr, rvp 35 [facility]    HX CHOLECYSTECTOMY  2011    Dr. Kelly Shepherd HX COLONOSCOPY  2014    adenoma and [facility]  Infectious Disease  Consult Note    Patient Name: Jian Arrieta  MRN: [id number]  Admission Date: 1/3/2025  Hospital Length of Stay: 2 days  Attending Physician: Mohinder Jimenes,*  Primary Care Provider: Reza Boyer MD     Isolation Status: No active isolations    Patient information was obtained from patient and past medical records.      Infectious Diseases  Consult performed by: Chris Schumacher MD  Consult ordered by: Chapo Neal NP  Reason for consult: toe osteo        Assessment/Plan:     Orthopedic  * Gangrene  69-year-old male with a history of CKD 4, IDDM, and PAD who is admitted for left 2nd metatarsal amputation fro gangrenous toe    -cxs with GNR and strep group g  -await final cxs  -continue zosyn   -stop vanco          Thank you for your consult. I will follow-up with patient. Please contact us if you have any additional questions.    Chris Schumacher MD  Infectious Disease  [facility] - Med Surg    Subjective:     Principal Problem: Gangrene    HPI: 69-year-old male with a history of CKD 4, IDDM, and PAF who presented as direct admit from Podiatry. Admitted for left 2nd metatarsal amputation for gangrenous toe. Patient was evaluated at bedside postoperatively denies any pain. Patient has insulin pump which apparently came dislodged last night which is his reading for uncontrolled glucose. On vanco and zosyn    Past Medical History:   Diagnosis Date    Alcohol abuse     Anasarca 1/28/2019    Anemia     Anticoagulant long-term use     Arthropathy associated with neurological disorder 9/2/2015    Atherosclerosis     Charcot foot due to diabetes mellitus     Chronic combined systolic and diastolic heart failure 01/29/2019 1-28-19 Left VentricleModerate decreased ejection fraction at 30%. Normal left ventricular cavity size. Normal wall thickness observed. Grade I (mild) left ventricular diastolic dysfunction consistent with impaired relaxation. Normal left atrial  divertics Dr Frankey Jeans    HX GI  2014    pill camera non bleeding ileal AVM    HX TONSILLECTOMY      NEUROLOGICAL PROCEDURE UNLISTED  2019    mri head contrast showed no acute lesions    SINUS SURGERY PROC UNLISTED      left ear surgery Dr Janel Weiner for meniere's    VASCULAR SURGERY PROCEDURE UNLIST  10/10    [address] left Graft Surgery  Dr. Willie Almonte  2019    carotids showed 72%DNZF, <50%LICA     Social History     Socioeconomic History    Marital status:      Spouse name: Not on file    Number of children: 0    Years of education: Not on file    Highest education level: Not on file   Occupational History    Occupation: [employer]   Social Needs    Financial resource strain: Not on file    Food insecurity:     Worry: Not on file     Inability: Not on file    Transportation needs:     Medical: Not on file     Non-medical: Not on file   Tobacco Use    Smoking status: Former Smoker     Packs/day: 1.00     Last attempt to quit: 10/22/2011     Years since quittin.8    Smokeless tobacco: Never Used   Substance and Sexual Activity    Alcohol use: No    Drug use: No    Sexual activity: Not on file   Lifestyle    Physical activity:     Days per week: Not on file     Minutes per session: Not on file    Stress: Not on file   Relationships    Social connections:     Talks on phone: Not on file     Gets together: Not on file     Attends Quaker service: Not on file     Active member of club or organization: Not on file     Attends meetings of clubs or organizations: Not on file     Relationship status: Not on file    Intimate partner violence:     Fear of current or ex partner: Not on file     Emotionally abused: Not on file     Physically abused: Not on file     Forced sexual activity: Not on file   Other Topics Concern    Not on file   Social History Narrative    Not on file     Current Outpatient Medications   Medication Sig    pressure. Moderate, global hypokinesis(see wall scoring diagram). Right VentricleNormal cavity size, wall thickness and ejection fraction. Wall motion n    Chronic pancreatitis 1/28/2019    CKD (chronic kidney disease) stage 3, GFR 30-59 ml/min     CKD (chronic kidney disease) stage 4, GFR 15-29 ml/min     Colon polyps     approx 5 yrs ago    Coronary artery disease due to calcified coronary lesion 05/08/2015    5 stents on ASA      Diabetic polyneuropathy associated with type 2 diabetes mellitus 9/2/2015    Diverticulosis 1/28/2019    DM type 2 with diabetic peripheral neuropathy 2/4/2019    Encounter for blood transfusion     Essential hypertension 1/28/2019    Former smoker 8/26/2015    Healed ulcer of left foot on examination 6/20/2017    Hematuria     Hydrocele     approx 1.5 yrs ago    Hyperphosphatemia     Hypoalbuminemia 2/4/2019    Hypocalcemia     Lymphedema of both lower extremities 1/29/2019    Mixed hyperlipidemia 5/8/2015    Morbid obesity with BMI of 50.0-59.9, adult 5/8/2015    Obstruction of right ureteropelvic junction (UPJ) due to stone 5/24/2021    Onychomycosis of multiple toenails with type 2 diabetes mellitus and peripheral neuropathy 6/20/2017    Perianal cyst     approx 2 yrs ago    Proteinuria     Pseudocyst of pancreas 1/28/2019 1-28-19 Liver has a cirrhotic morphology with no focal lesions.  Significant interval increase in ascites when compared to prior exam which may account for patient's abdominal distension.  Hypodense air-fluid collection along the body of the pancreas which is slightly smaller when compared to prior CT.  Findings may relate to pancreatic necrosis with pancreatic pseudocysts with infected pseudocyst    Skin cancer     skin cancer    Sleep apnea 8/26/2015    Status post bariatric surgery 1/11/2016    Type 2 diabetes mellitus, with long-term current use of insulin 5/8/2015    Urinary tract infection        Past Surgical History:   Procedure Laterality Date     ANGIOGRAPHY N/A 6/28/2019    Procedure: ANGIOGRAM-PV STENT;  Surgeon: Ewa Diagnostic Provider;  Location: [facility] OR;  Service: Radiology;  Laterality: N/A;    ANGIOPLASTY      total x5 stents    AORTOGRAPHY WITH SERIALOGRAPHY N/A 11/26/2024    Procedure: AORTOGRAM, WITH SERIALOGRAPHY;  Surgeon: Clinton Gibbs MD;  Location: [facility] CATH LAB/EP;  Service: Cardiology;  Laterality: N/A;    COLONOSCOPY N/A 10/6/2015    Procedure: COLONOSCOPY;  Surgeon: Shekhar Richards MD;  Location: [facility] ENDO (2ND FLR);  Service: Endoscopy;  Laterality: N/A;  BMI over 55/2nd floor case    5 day hold Plavix, Dr Kwadwo Arroyo    COLONOSCOPY N/A 5/13/2021    Procedure: COLONOSCOPY;  Surgeon: Huan Brumfield MD;  Location: [facility] ENDO;  Service: Endoscopy;  Laterality: N/A;    CORONARY ANGIOGRAPHY Right 3/20/2019    Procedure: ANGIOGRAM, CORONARY ARTERY;  Surgeon: Bob Duque MD;  Location: [facility] CATH LAB;  Service: Cardiology;  Laterality: Right;    CORONARY ARTERY BYPASS GRAFT  2017    x3    CORONARY BYPASS GRAFT ANGIOGRAPHY  3/20/2019    Procedure: Bypass graft study;  Surgeon: Bob Duque MD;  Location: [facility] CATH LAB;  Service: Cardiology;;    CYST REMOVAL      CYSTOSCOPY Right 6/30/2021    Procedure: CYSTOSCOPY;  Surgeon: William Diaz MD;  Location: [facility] 1ST FLR;  Service: Urology;  Laterality: Right;    CYSTOSCOPY N/A 10/16/2023    Procedure: CYSTOSCOPY;  Surgeon: Chrystal Dias MD;  Location: [facility] 1ST FLR;  Service: Urology;  Laterality: N/A;    CYSTOSCOPY N/A 12/6/2023    Procedure: CYSTOSCOPY;  Surgeon: William Diaz MD;  Location: [facility] OR 1ST FLR;  Service: Urology;  Laterality: N/A;    CYSTOSCOPY W/ URETERAL STENT PLACEMENT Right 6/16/2021    Procedure: CYSTOSCOPY, WITH URETERAL STENT INSERTION;  Surgeon: William Diaz MD;  Location: [facility] OR 2ND FLR;  Service: Urology;  Laterality: Right;  FLUORO LESS THAN 1 HOUR    ENDOSCOPIC ULTRASOUND OF UPPER GASTROINTESTINAL TRACT N/A 2/26/2020    Procedure:  metFORMIN (GLUCOPHAGE) 1,000 mg tablet Take 1 Tab by mouth two (2) times daily (with meals).  levETIRAcetam (KEPPRA) 750 mg tablet Take 1 Tab by mouth two (2) times a day.  diazePAM (VALIUM) 5 mg tablet TAKE 1 TABLET BY MOUTH 2 TIMES A DAY *NOT TO EXCEED 2 TABLETS PER DAY*    gabapentin (NEURONTIN) 600 mg tablet Take 1 Tab by mouth three (3) times daily.  benazepril (LOTENSIN) 20 mg tablet Take 1 Tab by mouth daily.  clopidogrel (PLAVIX) 75 mg tab TAKE 1 TABLET BY MOUTH ONCE DAILY    carvedilol (COREG) 12.5 mg tablet Take 1 Tab by mouth two (2) times a day.  amLODIPine (NORVASC) 10 mg tablet Take 1 Tab by mouth daily.  lansoprazole (PREVACID) 30 mg capsule TAKE 1 CAPSULE BY MOUTH ONCE DAILY BEFORE BREAKFAST    atorvastatin (LIPITOR) 40 mg tablet Take 1 Tab by mouth daily.  acetaminophen (TYLENOL) 325 mg tablet Take  by mouth every four (4) hours as needed for Pain.  aspirin 81 mg chewable tablet Take 81 mg by mouth daily.  triamcinolone (ARISTOCORT) 0.5 % topical cream Apply  to affected area two (2) times a day. use thin layer (Patient taking differently: Apply  to affected area two (2) times a day. use thin layer  Indications: applying to left ear)    naloxone (NARCAN) 4 mg/actuation nasal spray Use 1 spray intranasally into 1 nostril. Use a new Narcan nasal spray for subsequent doses and administer into alternating nostrils. May repeat every 2 to 3 minutes as needed. No current facility-administered medications for this visit.       No Known Allergies    REVIEW OF SYSTEMS: sees Dr. Romero Letters, no podiatry, colo 2014 Dr Molly Carrero  Ophtho - no vision change or eye pain  Oral - no mouth pain, tongue or tooth problems  Ears - no hearing loss, ear pain, fullness, no swallowing problems  Cardiac - no CP, PND, orthopnea, edema, palpitations or syncope  Chest - no breast masses  Resp - no wheezing, chronic coughing, dyspnea  GI - no heartburn, nausea, vomiting, change in bowel habits, ULTRASOUND, UPPER GI TRACT, ENDOSCOPIC;  Surgeon: Robbie Yang MD;  Location: [facility] ENDO;  Service: Endoscopy;  Laterality: N/A;    ESOPHAGOGASTRODUODENOSCOPY N/A 7/8/2019    Procedure: ESOPHAGOGASTRODUODENOSCOPY (EGD);  Surgeon: Huan Brumfield MD;  Location: [facility] ENDO;  Service: Endoscopy;  Laterality: N/A;    ESOPHAGOGASTRODUODENOSCOPY N/A 5/13/2021    Procedure: EGD (ESOPHAGOGASTRODUODENOSCOPY);  Surgeon: Huan Brumfield MD;  Location: [facility] ENDO;  Service: Endoscopy;  Laterality: N/A;    EXCISION OF HYDROCELE Bilateral 6/16/2021    Procedure: HYDROCELE REPAIR;  Surgeon: William Diaz MD;  Location: [facility] OR 2ND FLR;  Service: Urology;  Laterality: Bilateral;  2 HOURS    EXTRACTION - STONE Right 12/6/2023    Procedure: EXTRACTION - STONE;  Surgeon: William Diaz MD;  Location: [facility] OR 1ST FLR;  Service: Urology;  Laterality: Right;    FLUOROSCOPY N/A 6/16/2021    Procedure: FLUOROSCOPY;  Surgeon: William Diaz MD;  Location: [facility] OR 2ND FLR;  Service: Urology;  Laterality: N/A;    GASTRECTOMY      INSERTION OF DIALYSIS CATHETER N/A 5/17/2019    Procedure: pleurx;  Surgeon: Ewa Diagnostic Provider;  Location: [facility] OR 2ND FLR;  Service: General;  Laterality: N/A;  Room [facility]    KNEE ARTHROSCOPY      LAPAROSCOPIC CHOLECYSTECTOMY N/A 5/4/2020    Procedure: CHOLECYSTECTOMY, LAPAROSCOPIC;  Surgeon: SON Rowe MD;  Location: [facility] OR;  Service: General;  Laterality: N/A;    LASER LITHOTRIPSY N/A 6/30/2021    Procedure: LITHOTRIPSY, USING LASER;  Surgeon: William Diaz MD;  Location: [facility] OR 1ST FLR;  Service: Urology;  Laterality: N/A;    LIVER BIOPSY N/A 5/4/2020    Procedure: BIOPSY, LIVER, Laproscopic ;  Surgeon: SON Rowe MD;  Location: [facility] OR;  Service: General;  Laterality: N/A;    perianal surgery      perianal cyst removed    PYELOSCOPY Right 6/30/2021    Procedure: PYELOSCOPY;  Surgeon: William Diaz MD;  Location: [facility] OR [address] [place] [address];  Service: Urology;  Laterality: Right;     bleeding, hemorrhoids  Urinary - no dysuria, hematuria, flank pain, urgency, frequency  Endo - no polyuria, polydipsia, nocturia, hot flashes    Visit Vitals  /64   Pulse 66   Temp 97.7 °F (36.5 °C) (Oral)   Resp 14   Ht 5' 11\" (1.803 m)   Wt 169 lb (76.7 kg)   SpO2 97%   BMI 23.57 kg/m²     A&O x3  Affect is appropriate. Mood stable  No apparent distress  Anicteric, no JVD, adenopathy or thyromegaly. No carotid bruits or radiated murmur  Lungs clear to auscultation, no wheezes or rales  Heart showed regular rate and rhythm. No murmur, rubs, gallops  Abdomen soft nontender, no hepatosplenomegaly or masses.    Ext without c/c/e, 1+m pulses dp/pt    LABS  From 10/11 showed gluc 99,  cr 0.98, gfr 83, alt 43,  hba1c 5.6, ldl-p 1018, chol 124, tg 223, hdl 37, ldl-c 42, umar 11.2, tsh 2.10,          psa 0.90  From 4/12 showed                  hba1c 5.8, ldl-p 1136, chol 143, tg 188, hdl 38, ldl-c 67, umar 9.5  From 10/12 showed gluc 87,  cr 0.99, gfr 81, alt 15,  hba1c 5.6, ldl-p 500,   chol 109, tg 125, hdl 40, ldl-c 44  From 7/13 showed                  hba1c 5.7,                   chol 136, tg 137, hdl 42, ldl-c 67   From 2/14 showed                  hba1c 6.0,               chol 136, tg 72,   hdl 52, ldl-c 72, umar 347,  wbc 12.4, hb 12.0, plt 240, psa 1.39  From 5/14 showed                  hba1c 6.0,               chol 135, tg 94,   hdl 45, ldl-c 71, umar 469,  wbc 9.5,   hb 11.6, plt 232, psa 1.35  From 5/14 showed                                      fe 25, %sat 7, ferritin 32, b12 699, fol 16.1, spep neg  From 6/14 showed   gluc 100, cr 1.33, gfr 54  From 8/14 showed                  hba1c 6.1,               chol 137, tg 93,   hdl 46, ldl-c 98, umar 100  From 2/15 showed   gluc 100, cr 1.50, gfr 46, alt 14, hba1c 5.9,             umar 82.1,  wbc 9.9,   hb 11.2, plt 203  From 9/15 showed   gluc 88,   cr 1.30,    alt 11, hba1c 5.6,    chol 135, tg 123, hdl 43, ldl-c 67, umar 159,   wbc 9.2,   hb 11.8, plt 225  From 3/16 showed   gluc 77,   cr 1.58, gfr 45,   hba1c 6.0,                    hep c neg, na 133  From 7/16 showed   gluc 96,   cr 1.41, gfr 52,   hba1c 5.9,             umar 163,   wbc 9.6,  hb 12.1, plt 281,                    na 131  From 1/17 showed   gluc 79,   cr 1.50, gfr 48  From 7/17 showed   gluc 92,   cr 1.57, gfr 45, alt 6,   hba1c 5.8,   chol 170, tg 135, hdl 44, ldl-c 99, umar 1043  From 11/17 showed      hba1c 5.7,   chol 156, tg 125, hdl 51, ldl-c 80, umar 779,   wbc 10.1, hb 12.0, plt 264  From 2/18 showed   gluc 83,   cr 1.40, gfr 54,                       wbc 6.2,   hb 9.8,   plt 216, fe 66 ferritin 58.6, b12 376  From 3/18 showed   gluc 82,   cr 1.69, gfr 41,   hba1c 5.7,            umar 275  From 10/18 showed gluc 86,   cr 1.51, gfr47,   hba1c 5.7,             chol 149, tg 150, hdl 33, ldl-c 86  From 1/19 showed   gluc 80,   cr 1.43, gfr 50, alt 11, hba1c 5.8,     chol 139, tg 146, hdl 37, ld-c 73,    wbc 11.6, hb 11.3, plt 303, k 5.6  From 3/19 showed   gluc 127, cr 1.40, gfr 54,            wbc 7.3,   hb 9.4, plt 222  From 8/19 showed   gluc 88,   cr 1.48, gfr 48, alt 14,  hba1c 5.9,   chol 118, tg 147, hdl 30, ldl-c 59, umar 760,    wbc 7.2, hb 10.1, plt 238    Patient Active Problem List   Diagnosis Code    Hyperlipidemia E78.5    Chronic back pain inoperable Dr. Alfie Weldon and Dr. Ijeoma Allred M54.9, G89.29    Peripheral vascular disease s/p left iliac bpg Dr. Monse Lynch 10/10 I73.9    Erectile dysfunction N52.9    Arthritis, degenerative M19.90    Primary hypertension I10    GERD without esophagitis K21.9    Controlled type 2 diabetes mellitus with albuminuria E11.29, R80.9    Advance directive in chart Z78.9    Chronic kidney disease (CKD) stage G3a/A3 N18.3    New onset seizure [address] [address] R56.9     Assessment and plan:  1. Ménière's. Followup Dr. Jaleesa Hidalgo prn  2. Diabetes w microalbuminuria. Well-controlled on metformin alone, close watch on renal fxn  3. Hyperlipidemia.   Continue current PYELOSCOPY Right 10/16/2023    Procedure: PYELOSCOPY;  Surgeon: Chrystal Dias MD;  Location: [facility] OR [facility] FLR;  Service: Urology;  Laterality: Right;    PYELOSCOPY Right 12/6/2023    Procedure: PYELOSCOPY;  Surgeon: William Diaz MD;  Location: [facility] OR [facility] FLR;  Service: Urology;  Laterality: Right;    REMOVAL-STENT Right 12/6/2023    Procedure: REMOVAL-STENT;  Surgeon: William Diaz MD;  Location: [facility] OR [facility] FLR;  Service: Urology;  Laterality: Right;    REPLACEMENT OF STENT Right 6/30/2021    Procedure: REPLACEMENT, STENT;  Surgeon: William Diaz MD;  Location: [facility] OR [facility] FLR;  Service: Urology;  Laterality: Right;    RETROGRADE PYELOGRAPHY Right 10/16/2023    Procedure: PYELOGRAM, RETROGRADE;  Surgeon: Chrystal Dias MD;  [facility];  Service: Urology;  Laterality: Right;    RETROGRADE PYELOGRAPHY Right 12/6/2023    Procedure: PYELOGRAM, RETROGRADE;  Surgeon: William Diaz MD;  [facility];  Service: Urology;  Laterality: Right;    URETERAL STENT PLACEMENT Right 10/16/2023    Procedure: INSERTION, STENT, URETER;  Surgeon: Chrystal Dias MD;  [facility];  Service: Urology;  Laterality: Right;    URETEROSCOPY Right 6/30/2021    Procedure: URETEROSCOPY FLEXIBLE URETEROSCOPE;  Surgeon: William Diaz MD;  [facility];  Service: Urology;  Laterality: Right;    URETEROSCOPY Right 10/16/2023    Procedure: URETEROSCOPY;  Surgeon: Chrystal Dias MD;  [facility];  Service: Urology;  Laterality: Right;    URETEROSCOPY Right 12/6/2023    Procedure: URETEROSCOPY;  Surgeon: William Diaz MD;  [facility];  Service: Urology;  Laterality: Right;       Review of patient's allergies indicates:  No Known Allergies    Medications:  Medications Prior to Admission   Medication Sig    calcitRIOL (ROCALTROL) 0.25 MCG Cap Take 0.25 mcg by mouth once daily.    isosorbide mononitrate (IMDUR) 30 MG 24 hr  regimen. 4. Chronic back problems. Continue current. 5. Vascular. F/U Dr. Vance Bear as directed  6. CKD. F/U Dr David Devine  7. HTN. Continue current regimen. 8. Anemia. Declined hematology eval for now, wants to take iron  9. Possible seizures. Declined neuro consult, continue keppra        RTC 2/20    Above conditions discussed at length and patient vocalized understanding.   All questions answered to patient satisfaction tablet Take 1 tablet (30 mg total) by mouth once daily.    [DISCONTINUED] furosemide (LASIX) 40 MG tablet Take 40 mg by mouth once daily.    acetaminophen (TYLENOL) 325 MG tablet Take 2 tablets (650 mg total) by mouth every 8 (eight) hours as needed for Pain.    amLODIPine (NORVASC) 10 MG tablet Take 1 tablet by mouth once daily.    aspirin (ECOTRIN) 81 MG EC tablet Take 1 tablet (81 mg total) by mouth once daily.    blood pressure monitor Kit 1 kit by Misc.(Non-Drug; Combo Route) route 2 (two) times a day.    blood-glucose sensor (DEXCOM G6 SENSOR) Sandi Inject 1 each into the skin every 10 days.    blood-glucose transmitter (DEXCOM G6 TRANSMITTER) Sandi Inject 1 each into the skin every 10 days.    clopidogreL (PLAVIX) 75 mg tablet Take 1 tablet (75 mg total) by mouth once daily.    glucagon (BAQSIMI) 3 mg/actuation Spry 1 each by Nasal route daily as needed (if glucose is less than 70 - can repeat in 1 hour if still low/less than 70).    HUMALOG KWIKPEN INSULIN 100 unit/mL pen Inject 5 Units into the skin before meals as needed (before each meal - if OFF of insulin pump). This is emergency back up insulin to use with meals if OFF of insulin pump    insulin detemir U-100, Levemir, 100 unit/mL (3 mL) SubQ InPn pen Inject 30 Units into the skin every evening. This is emergency back up insulin only if OFF of your insulin pump    insulin lispro-aabc (LYUMJEV U-100 INSULIN) 100 unit/mL Inject 80 Units into the skin continuous. Uses up to 80 units daily with omnipod 5 insulin pump as directed.    insulin pump cart,automated,BT (OMNIPOD 5 G6 PODS, GEN 5,) Crtg INJECT 1 EACH INTO THE SKIN EVERY OTHER DAY.    metoprolol succinate (TOPROL-XL) 50 MG 24 hr tablet Take 50 mg by mouth 2 (two) times daily.    rosuvastatin (CRESTOR) 20 MG tablet Take 20 mg by mouth once daily.    torsemide (DEMADEX) 20 MG Tab Take 1 tablet (20 mg total) by mouth once daily.    [DISCONTINUED] blood sugar diagnostic (ONETOUCH VERIO TEST STRIPS) Strp  "1 each by Misc.(Non-Drug; Combo Route) route 2 (two) times a day.    [DISCONTINUED] lancets (ONETOUCH DELICA PLUS LANCET) 33 gauge Misc 1 lancet  by Misc.(Non-Drug; Combo Route) route 2 (two) times daily.    [DISCONTINUED] pen needle, diabetic 32 gauge x 5/32" Ndle Use with toujeo insulin one daily only as Emergency use/back up insulin - if OFF OF your insulin pump.     Antibiotics (From admission, onward)      Start     Stop Route Frequency Ordered    01/03/25 2100  piperacillin-tazobactam (ZOSYN) 4.5 g in D5W 100 mL IVPB (MB+)         -- IV Every 12 hours (non-standard times) 01/03/25 1750    01/03/25 1850  vancomycin - pharmacy to dose  (vancomycin IVPB (PEDS and ADULTS))        Placed in "And" Linked Group    -- IV pharmacy to manage frequency 01/03/25 1750          Antifungals (From admission, onward)      None          Antivirals (From admission, onward)      None             Immunization History   Administered Date(s) Administered    COVID-19, MRNA, LN-S, PF (MODERNA FULL 0.5 ML DOSE) 01/27/2021, 02/24/2021, 11/22/2021    Influenza 10/31/2011, 12/03/2014    Influenza (FLUAD) - Quadrivalent - Adjuvanted - PF *Preferred* (65+) 09/30/2020, 10/27/2023    Influenza - Quadrivalent - PF *Preferred* (6 months and older) 10/31/2011, 12/03/2014    Influenza - Trivalent - Fluad - Adjuvanted - PF (65 years and older 11/28/2024    Influenza - Trivalent - Fluzone High Dose - PF (65 years and older) 11/15/2019    Influenza A (H1N1) 2009 Monovalent - IM 11/18/2009    PPD Test 07/11/2019    Pneumococcal Polysaccharide - 23 Valent 10/26/2020    Tdap 03/13/2017       Family History       Problem Relation (Age of Onset)    Cancer Mother, Father, Paternal Grandfather, Brother    Diabetes Maternal Grandmother    Heart disease Father    No Known Problems Paternal Grandmother    Obesity Sister    Parkinsonism Brother    Stroke Maternal Grandfather          Social History     Socioeconomic History    Marital status:    Tobacco " Use    Smoking status: Former     Current packs/day: 0.00     Average packs/day: 2.0 packs/day for 30.0 years (60.0 ttl pk-yrs)     Types: Cigarettes     Start date: 1975     Quit date: 2005     Years since quittin.9    Smokeless tobacco: Never   Substance and Sexual Activity    Alcohol use: No     Comment: started ~, reports 1 shot daily, max 3 shots daily, vague about alcohol consumption. Last drink 2018    Drug use: No     Social Drivers of Health     Financial Resource Strain: Patient Declined (1/3/2025)    Overall Financial Resource Strain (CARDIA)     Difficulty of Paying Living Expenses: Patient declined   Food Insecurity: Patient Declined (1/3/2025)    Hunger Vital Sign     Worried About Running Out of Food in the Last Year: Patient declined     Ran Out of Food in the Last Year: Patient declined   Transportation Needs: Patient Declined (1/3/2025)    TRANSPORTATION NEEDS     Transportation : Patient declined   Physical Activity: Patient Declined (2024)    Exercise Vital Sign     Days of Exercise per Week: Patient declined     Minutes of Exercise per Session: Patient declined   Stress: Patient Declined (1/3/2025)    [employer] [place] [employer] - Occupational Stress Questionnaire     Feeling of Stress : Patient declined   Housing Stability: Patient Declined (1/3/2025)    Housing Stability Vital Sign     Unable to Pay for Housing in the Last Year: Patient declined     Homeless in the Last Year: Patient declined     Review of Systems   Constitutional:  Negative for chills and fever.   Respiratory:  Negative for shortness of breath.    Cardiovascular:  Negative for chest pain.   Gastrointestinal:  Negative for diarrhea, nausea and vomiting.   Skin:  Positive for wound.     Objective:     Vital Signs (Most Recent):  Temp: 97.9 °F (36.6 °C) (25 111)  Pulse: 70 (25 111)  Resp: 18 (25 111)  BP: 119/66 (25 111)  SpO2: 96 % (25) Vital Signs  (24h Range):  Temp:  [97.9 °F (36.6 °C)-98.9 °F (37.2 °C)] 97.9 °F (36.6 °C)  Pulse:  [61-82] 70  Resp:  [17-18] 18  SpO2:  [96 %-98 %] 96 %  BP: ()/(54-68) 119/66     Weight: 108.4 kg (238 lb 15.7 oz)  Body mass index is 37.43 kg/m².    Estimated Creatinine Clearance: 21.2 mL/min (A) (based on SCr of 3.8 mg/dL (H)).     Physical Exam  Constitutional:       Appearance: Normal appearance.   HENT:      Head: Normocephalic.      Mouth/Throat:      Mouth: Mucous membranes are moist.      Pharynx: Oropharynx is clear.   Eyes:      Pupils: Pupils are equal, round, and reactive to light.   Cardiovascular:      Rate and Rhythm: Normal rate and regular rhythm.      Pulses: Normal pulses.      Heart sounds: Normal heart sounds.   Pulmonary:      Effort: Pulmonary effort is normal.      Breath sounds: Normal breath sounds.   Abdominal:      General: Bowel sounds are normal.      Palpations: Abdomen is soft.   Musculoskeletal:         General: Normal range of motion.      Cervical back: Normal range of motion.   Skin:     General: Skin is warm and dry.      Capillary Refill: Capillary refill takes less than 2 seconds.      Comments: Left foot bandaged postoperatively   Neurological:      General: No focal deficit present.      Mental Status: He is alert and oriented to person, place, and time. Mental status is at baseline.   Psychiatric:         Mood and Affect: Mood normal.         Behavior: Behavior normal.          Significant Labs: All pertinent labs within the past 24 hours have been reviewed.    Significant Imaging: I have reviewed all pertinent imaging results/findings within the past 24 hours.

## 2025-01-05 NOTE — PROGRESS NOTES
Pharmacokinetic Assessment Follow Up: IV Vancomycin    Vancomycin serum concentration assessment(s):    The random level was drawn correctly and can be used to guide therapy at this time. The measurement is within the desired definitive target range of 15 to 20 mcg/mL.    Vancomycin Regimen Plan:    Will give a one time dose of vancomycin 1250 mg to maintain a target level of 15-20 mcg/mL. Will obtain a random level on 1/5@2000 to help guide further dosing.     Drug levels (last 3 results):  Recent Labs   Lab Result Units 01/04/25  1810   Vancomycin, Random ug/mL 17.5       Pharmacy will continue to follow and monitor vancomycin.    Please contact pharmacy at extension [phone] for questions regarding this assessment.    Thank you for the consult,   Darwin Hunt, PharmD, [facility]       Patient brief summary:  Jian Arrieta is a 69 y.o. male initiated on antimicrobial therapy with IV Vancomycin for treatment of bone/joint infection    The patient's current regimen is pulse dosing based on random levels    Drug Allergies:   Review of patient's allergies indicates:  No Known Allergies    Actual Body Weight:   108.4 kg    Renal Function:   Estimated Creatinine Clearance: 21.8 mL/min (A) (based on SCr of 3.7 mg/dL (H)).    Dialysis Method (if applicable):  N/A    CBC (last 72 hours):  Recent Labs   Lab Result Units 01/04/25  0637   WBC K/uL 6.03   Hemoglobin g/dL 8.8*   Hemoglobin A1C % 9.7*   Hematocrit % 27.3*   Platelets K/uL 179   Gran % % 61.6   Lymph % % 26.9   Mono % % 7.3   Eosinophil % % 3.3   Basophil % % 0.7   Differential Method  Automated       Metabolic Panel (last 72 hours):  Recent Labs   Lab Result Units 01/03/25  1133 01/04/25  0637   Sodium mmol/L 133* 136   Potassium mmol/L 4.2 4.1   Chloride mmol/L 96 100   CO2 mmol/L 28 28   Glucose mg/dL 432* 180*   BUN mg/dL 47* 42*   Creatinine mg/dL 3.7* 3.7*   Albumin g/dL  --  2.1*   Total Bilirubin mg/dL  --  0.3   Alkaline Phosphatase U/L  --  153*   AST U/L   --  69*   ALT U/L  --  62*       Vancomycin Administrations:  vancomycin given in the last 96 hours                     vancomycin 2 g in 0.9% sodium chloride 500 mL IVPB (mg) 2,000 mg New Bag 01/03/25 1932                    Microbiologic Results:  Microbiology Results (last 7 days)       Procedure Component Value Units Date/Time    Gram stain [[id number]] Collected: 01/03/25 1536    Order Status: Completed Specimen: Incision site from Toe, Left Foot Updated: 01/04/25 0511     Gram Stain Result Rare WBC's      Moderate Gram positive cocci    Narrative:      Bone left second toe    Aerobic culture [[id number]] Collected: 01/03/25 1536    Order Status: Sent Specimen: Incision site from Toe, Left Foot Updated: 01/03/25 2012    Fungus culture [[id number]] Collected: 01/03/25 1536    Order Status: Sent Specimen: Incision site from Toe, Left Foot Updated: 01/03/25 2012    AFB Culture & Smear [[id number]] Collected: 01/03/25 1536    Order Status: Sent Specimen: Incision site from Toe, Left Foot Updated: 01/03/25 2012    Culture, Anaerobe [[id number]] Collected: 01/03/25 1536    Order Status: Sent Specimen: Incision site from Toe, Left Foot Updated: 01/03/25 2012

## 2025-01-05 NOTE — ASSESSMENT & PLAN NOTE
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  Seen at Podiatry this morning and sent to hospital for OR    Now status post amputation, concern for mild area at base of medial great metatarsal.  Consult ID and Podiatry  Continue vancomycin and Zosyn renal dose for now  Pain control

## 2025-01-05 NOTE — PT/OT/SLP EVAL
Physical Therapy Evaluation and Discharge Note    Patient Name:  Jian Arrieta   MRN:  [id number]    Recommendations:     Discharge Recommendations: Low Intensity Therapy (HH PT/OT)  Discharge Equipment Recommendations: none   Barriers to discharge: None    Assessment:     Jian Arrieta is a 69 y.o. male admitted with a medical diagnosis of Gangrene. .  At this time, patient is functioning at their prior level of function and does not require further acute PT services. Pt is a poor candidate for skilled acute PT services. Will d/c PT orders. Recommending d/c home with low intensity therapy (HH PT/OT). Pt reports he is able to stay on the 1st floor of home.     Recent Surgery: Procedure(s) (LRB):  AMPUTATION, TOE (Left) 2 Days Post-Op    Plan:     During this hospitalization, patient does not require further acute PT services.  Please re-consult if situation changes.      Subjective     Chief Complaint: mild L foot pain  Patient/Family Comments/goals: to use restroom, pt reports he can stay on 1st floor of apt with ex wife  Pain/Comfort:  Pain Rating 1: 2/10  Location - Side 1: Left  Location 1: foot  Pain Addressed 1: Reposition, Distraction, Cessation of Activity, Nurse notified  Pain Rating Post-Intervention 1:  (not rated)    Patients cultural, spiritual, Tenriism conflicts given the current situation: no    Living Environment:  Pt lives alone in a 2 story garage apt with 20 steps to enter with L HR; WIS with shower chair and GB  Prior to admission, patients level of function was Mod I with use of RW or electric chair, has lift recliner chair, denies any recent falls, Mod I for ADL's.  Equipment used at home: power chair, bedside commode, walker, rolling, rollator, shower chair, hospital bed, grab bar.   Upon discharge, patient will have assistance from ex wife / limited, pt reports he can stay downstairs with her.    Objective:     Communicated with nsg prior to session.  Patient found HOB elevated with   upon  PT entry to room.    General Precautions: Standard, fall    Orthopedic Precautions:LLE weight bearing as tolerated (with emphasis on heel touch in post -op shoe per podiatry)   Braces:  (forefoot offloading darco)  Respiratory Status: Room air    Exams:  Cognitive Exam:  Patient is oriented to Person, Place, Time, and Situation  Postural Exam:  Patient presented with the following abnormalities:    -       Rounded shoulders  Sensation:    -       Impaired   hx of peripheral neuropathy   Skin Integrity/Edema:      -       Skin integrity: Wound surgical L 2nd toe amp with dressing and bandaging intact, mild drainage  -       Edema: Moderate BLE  RLE ROM: WFL  RLE Strength: WFL for pt's needs based on functional assessment  LLE ROM: WFL, L foot limited by dressing  LLE Strength: WFL for pt's needs based on functional assessment    Functional Mobility:  Bed Mobility:     Scooting: stand by assistance  Supine to Sit: stand by assistance  Sit to Supine: stand by assistance  Transfers:     Sit to Stand:  stand by assistance with rolling walker; verbal and visual cues for hand/foot placement and sequencing for maintaining precautions with poor carryover  Toilet Transfer: stand by assistance with  rolling walker  using  Step Transfer  Gait: Pt ambulated ~15 ft X 2 to/from restroom with RW and SBA, pt educated on 3 pt gait sequencing and proper use of RW with poor carryover; VC's for heel WB LLE, pt maintained Wb precautions using forefoot offloading darco    AM-PAC 6 CLICK MOBILITY  Total Score:19       Treatment and Education:  Pt educated on role of PT/POC and pt agreeable to participate in therapy session  Pt educated on WBAT LLE, emphasis to heel touch in post op shoe per podiatry.   Obtained and fitted pt for forefoot offloading darco to promote heel WB. Pt utilized RW and shoe to ambulate to/from restroom with SBA.   Pt has poor receptiveness to learning and poor safety awareness.   Pt declined practicing  "ascending/descending an 8" step in effort to assist with safe transition home with stair negotiation and was noncompliant with safe transitions sit <> stand with RW.   Pt pushed RW away upon approach to bed.   Pt did not appear interested in therapist's recommendations for safe functional mobility.   For these reasons, pt is a poor candidate for therapy and notified of d/c recs for  services and discharge from skilled acute services  Nsg notified  Pt educated on importance of short distance ambulation while following precautions, avoiding prolonged standing/ambulating, use of darco shoe, use of RW, and elevation of LLE at rest to allow for proper healing  LLE elevated on pillow with heels floated to reduce edema, promote healing, and prevent pressure injuries.       AM-PAC 6 CLICK MOBILITY  Total Score:19     Patient left HOB elevated with all lines intact, call button in reach, bed alarm on, and nsg notified.    GOALS:   Multidisciplinary Problems       Physical Therapy Goals          Problem: Physical Therapy    Goal Priority Disciplines Outcome Interventions   Physical Therapy Goal     PT, PT/OT Adequate for Care Transition    Description: Goals to be met by: 25     Patient will increase functional independence with mobility by performin. Gait  x 15 feet with Stand-by Assistance and maintaining weight-bearing precaution(s) using Rolling Walker.                          History:     Past Medical History:   Diagnosis Date    Alcohol abuse     Anasarca 2019    Anemia     Anticoagulant long-term use     Arthropathy associated with neurological disorder 2015    Atherosclerosis     Charcot foot due to diabetes mellitus     Chronic combined systolic and diastolic heart failure 2019 Left VentricleModerate decreased ejection fraction at 30%. Normal left ventricular cavity size. Normal wall thickness observed. Grade I (mild) left ventricular diastolic dysfunction consistent with " impaired relaxation. Normal left atrial pressure. Moderate, global hypokinesis(see wall scoring diagram). Right VentricleNormal cavity size, wall thickness and ejection fraction. Wall motion n    Chronic pancreatitis 1/28/2019    CKD (chronic kidney disease) stage 3, GFR 30-59 ml/min     CKD (chronic kidney disease) stage 4, GFR 15-29 ml/min     Colon polyps     approx 5 yrs ago    Coronary artery disease due to calcified coronary lesion 05/08/2015    5 stents on ASA      Diabetic polyneuropathy associated with type 2 diabetes mellitus 9/2/2015    Diverticulosis 1/28/2019    DM type 2 with diabetic peripheral neuropathy 2/4/2019    Encounter for blood transfusion     Essential hypertension 1/28/2019    Former smoker 8/26/2015    Healed ulcer of left foot on examination 6/20/2017    Hematuria     Hydrocele     approx 1.5 yrs ago    Hyperphosphatemia     Hypoalbuminemia 2/4/2019    Hypocalcemia     Lymphedema of both lower extremities 1/29/2019    Mixed hyperlipidemia 5/8/2015    Morbid obesity with BMI of 50.0-59.9, adult 5/8/2015    Obstruction of right ureteropelvic junction (UPJ) due to stone 5/24/2021    Onychomycosis of multiple toenails with type 2 diabetes mellitus and peripheral neuropathy 6/20/2017    Perianal cyst     approx 2 yrs ago    Proteinuria     Pseudocyst of pancreas 1/28/2019 1-28-19 Liver has a cirrhotic morphology with no focal lesions.  Significant interval increase in ascites when compared to prior exam which may account for patient's abdominal distension.  Hypodense air-fluid collection along the body of the pancreas which is slightly smaller when compared to prior CT.  Findings may relate to pancreatic necrosis with pancreatic pseudocysts with infected pseudocyst    Skin cancer     skin cancer    Sleep apnea 8/26/2015    Status post bariatric surgery 1/11/2016    Type 2 diabetes mellitus, with long-term current use of insulin 5/8/2015    Urinary tract infection        Past Surgical  History:   Procedure Laterality Date    ANGIOGRAPHY N/A 6/28/2019    Procedure: ANGIOGRAM-PV STENT;  Surgeon: Ewa Diagnostic Provider;  Location: [facility] OR;  Service: Radiology;  Laterality: N/A;    ANGIOPLASTY      total x5 stents    AORTOGRAPHY WITH SERIALOGRAPHY N/A 11/26/2024    Procedure: AORTOGRAM, WITH SERIALOGRAPHY;  Surgeon: Clinton Gibbs MD;  Location: [facility] CATH LAB/EP;  Service: Cardiology;  Laterality: N/A;    COLONOSCOPY N/A 10/6/2015    Procedure: COLONOSCOPY;  Surgeon: Shekhar Richrads MD;  Location: [facility] ENDO (2ND FLR);  Service: Endoscopy;  Laterality: N/A;  BMI over 55/2nd floor case    5 day hold Plavix, Dr Kwadwo Arroyo    COLONOSCOPY N/A 5/13/2021    Procedure: COLONOSCOPY;  Surgeon: Huan Brumfield MD;  Location: [facility] ENDO;  Service: Endoscopy;  Laterality: N/A;    CORONARY ANGIOGRAPHY Right 3/20/2019    Procedure: ANGIOGRAM, CORONARY ARTERY;  Surgeon: Bob Duque MD;  Location: [facility] CATH LAB;  Service: Cardiology;  Laterality: Right;    CORONARY ARTERY BYPASS GRAFT  2017    x3    CORONARY BYPASS GRAFT ANGIOGRAPHY  3/20/2019    Procedure: Bypass graft study;  Surgeon: Bbo Duque MD;  Location: [facility] CATH LAB;  Service: Cardiology;;    CYST REMOVAL      CYSTOSCOPY Right 6/30/2021    Procedure: CYSTOSCOPY;  Surgeon: William Diaz MD;  Location: [facility] 1ST FLR;  Service: Urology;  Laterality: Right;    CYSTOSCOPY N/A 10/16/2023    Procedure: CYSTOSCOPY;  Surgeon: Chrystal Dias MD;  Location: [facility] 1ST FLR;  Service: Urology;  Laterality: N/A;    CYSTOSCOPY N/A 12/6/2023    Procedure: CYSTOSCOPY;  Surgeon: William Diaz MD;  Location: [facility] OR 1ST FLR;  Service: Urology;  Laterality: N/A;    CYSTOSCOPY W/ URETERAL STENT PLACEMENT Right 6/16/2021    Procedure: CYSTOSCOPY, WITH URETERAL STENT INSERTION;  Surgeon: William Diaz MD;  Location: [facility] OR 2ND FLR;  Service: Urology;  Laterality: Right;  FLUORO LESS THAN 1 HOUR    ENDOSCOPIC ULTRASOUND OF UPPER  GASTROINTESTINAL TRACT N/A 2/26/2020    Procedure: ULTRASOUND, UPPER GI TRACT, ENDOSCOPIC;  Surgeon: Robbie Yang MD;  Location: [facility] ENDO;  Service: Endoscopy;  Laterality: N/A;    ESOPHAGOGASTRODUODENOSCOPY N/A 7/8/2019    Procedure: ESOPHAGOGASTRODUODENOSCOPY (EGD);  Surgeon: Huan Brumfield MD;  Location: [facility] ENDO;  Service: Endoscopy;  Laterality: N/A;    ESOPHAGOGASTRODUODENOSCOPY N/A 5/13/2021    Procedure: EGD (ESOPHAGOGASTRODUODENOSCOPY);  Surgeon: Huan Brumfield MD;  Location: [facility] ENDO;  Service: Endoscopy;  Laterality: N/A;    EXCISION OF HYDROCELE Bilateral 6/16/2021    Procedure: HYDROCELE REPAIR;  Surgeon: William Diaz MD;  Location: [facility] OR 2ND FLR;  Service: Urology;  Laterality: Bilateral;  2 HOURS    EXTRACTION - STONE Right 12/6/2023    Procedure: EXTRACTION - STONE;  Surgeon: William Diaz MD;  Location: [facility] OR 1ST FLR;  Service: Urology;  Laterality: Right;    FLUOROSCOPY N/A 6/16/2021    Procedure: FLUOROSCOPY;  Surgeon: William Diaz MD;  Location: [facility] OR 2ND FLR;  Service: Urology;  Laterality: N/A;    GASTRECTOMY      INSERTION OF DIALYSIS CATHETER N/A 5/17/2019    Procedure: pleurx;  Surgeon: [facility] Diagnostic Provider;  Location: [facility] OR 2ND FLR;  Service: General;  Laterality: N/A;  Room 188/[facility]    KNEE ARTHROSCOPY      LAPAROSCOPIC CHOLECYSTECTOMY N/A 5/4/2020    Procedure: CHOLECYSTECTOMY, LAPAROSCOPIC;  Surgeon: SON Rowe MD;  Location: [facility] OR;  Service: General;  Laterality: N/A;    LASER LITHOTRIPSY N/A 6/30/2021    Procedure: LITHOTRIPSY, USING LASER;  Surgeon: William Diaz MD;  Location: [facility] OR 1ST FLR;  Service: Urology;  Laterality: N/A;    LIVER BIOPSY N/A 5/4/2020    Procedure: BIOPSY, LIVER, Laproscopic ;  Surgeon: SON Rowe MD;  Location: [facility] OR;  Service: General;  Laterality: N/A;    perianal surgery      perianal cyst removed    PYELOSCOPY Right 6/30/2021    Procedure: PYELOSCOPY;  Surgeon: William Diaz MD;  Location: [facility] OR  1ST FLR;  Service: Urology;  Laterality: Right;    PYELOSCOPY Right 10/16/2023    Procedure: PYELOSCOPY;  Surgeon: Chrystal Dias MD;  Location: [facility] OR 1ST FLR;  Service: Urology;  Laterality: Right;    PYELOSCOPY Right 12/6/2023    Procedure: PYELOSCOPY;  Surgeon: William Diaz MD;  Location: [facility] OR 1ST FLR;  Service: Urology;  Laterality: Right;    REMOVAL-STENT Right 12/6/2023    Procedure: REMOVAL-STENT;  Surgeon: William Diaz MD;  Location: [facility] OR 1ST FLR;  Service: Urology;  Laterality: Right;    REPLACEMENT OF STENT Right 6/30/2021    Procedure: REPLACEMENT, STENT;  Surgeon: William Diaz MD;  Location: [facility] OR 1ST FLR;  Service: Urology;  Laterality: Right;    RETROGRADE PYELOGRAPHY Right 10/16/2023    Procedure: PYELOGRAM, RETROGRADE;  Surgeon: Chrystal Dias MD;  Location: [facility] OR 1ST FLR;  Service: Urology;  Laterality: Right;    RETROGRADE PYELOGRAPHY Right 12/6/2023    Procedure: PYELOGRAM, RETROGRADE;  Surgeon: William Diaz MD;  Location: [facility] OR 1ST FLR;  Service: Urology;  Laterality: Right;    URETERAL STENT PLACEMENT Right 10/16/2023    Procedure: INSERTION, STENT, URETER;  Surgeon: Chrystal Dias MD;  Location: [facility] OR 1ST FLR;  Service: Urology;  Laterality: Right;    URETEROSCOPY Right 6/30/2021    Procedure: URETEROSCOPY FLEXIBLE URETEROSCOPE;  Surgeon: William Diaz MD;  Location: [facility] OR 1ST FLR;  Service: Urology;  Laterality: Right;    URETEROSCOPY Right 10/16/2023    Procedure: URETEROSCOPY;  Surgeon: Chrystal Dias MD;  Location: [facility] OR 1ST FLR;  Service: Urology;  Laterality: Right;    URETEROSCOPY Right 12/6/2023    Procedure: URETEROSCOPY;  Surgeon: William Diaz MD;  Location: [facility] OR 1ST FLR;  Service: Urology;  Laterality: Right;       Time Tracking:     PT Received On: 01/05/25  PT Start Time: 0928     PT Stop Time: 1001  PT Total Time (min): 33 min co-eval with OT    Billable Minutes: Evaluation 10, Gait Training 10, and  Therapeutic Activity 13      01/05/2025

## 2025-01-05 NOTE — PT/OT/SLP EVAL
Occupational Therapy   Evaluation and Discharge Note    Name: Jian Arrieta  MRN: [id number]  Admitting Diagnosis: Gangrene  Recent Surgery: Procedure(s) (LRB):  AMPUTATION, TOE (Left) 2 Days Post-Op    Recommendations:     Discharge Recommendations: Low Intensity Therapy  Discharge Equipment Recommendations: none  Barriers to discharge:  Decreased caregiver support    Assessment:     Jian Arrieta is a 69 y.o. male with a medical diagnosis of Gangrene. Pt was agreeable to OT/PT evaluation, but hesitant with following instructions provided by therapists.  Pt reports that he was mod I with mobility and ADLs at [facility].  He completed his evaluation and noted to require SBA with func mobility and setup A with ADLS.  Pt noted with impaired safety awareness during evaluation.  Pt will have assistance/supervision as needed once discharged home.  Due to these factors, pt is discharged from OT services.  No DME required at this time.       Plan:     During this hospitalization, patient does not require further acute OT services.  Please re-consult if situation changes.    Plan of Care Reviewed with: patient    Subjective     Chief Complaint: none given  Patient/Family Comments/goals: return home     Occupational Profile:  Living Environment: pt lives on 2nd floor apartment over garage on same property as wife in main house - ~20 steps with left hand rail to reach 2nd floor apt, but states he can stay in main house with 3 EULA to enter ([facility]) upon discharge - has access to WIS with GB and SC  Previous level of function: mod I with mobility and ADLS  Equipment Used at home: power chair, lift device, bedside commode, walker, rolling, rollator, shower chair, grab bar, hospital bed (lift recliner)  Assistance upon Discharge: from wife    Pain/Comfort:  Pain Rating 1: 2/10  Location - Side 1: Left  Location 1: foot  Pain Addressed 1: Reposition, Distraction, Cessation of Activity, Nurse notified  Pain Rating Post-Intervention 1:  2/10    Patients cultural, spiritual, Mandaeism conflicts given the current situation: no    Objective:     Communicated with: nurse prior to session.  Patient found HOB elevated with bed alarm upon OT entry to room.    General Precautions: Standard, fall  Orthopedic Precautions: LLE weight bearing as tolerated (heel touch using Darco shoe for transfers and short distances)  Braces:  (darco)  Respiratory Status: Room air     Occupational Performance:    Bed Mobility:    Patient completed Scooting/Bridging with stand by assistance  Patient completed Supine to Sit with stand by assistance  Patient completed Sit to Supine with stand by assistance    Functional Mobility/Transfers:  Patient completed Sit <> Stand Transfer with stand by assistance  with  rolling walker   Patient completed Toilet Transfer Step Transfer technique with stand by assistance with  rolling walker and grab bars  Functional Mobility: pt walked in room from bed <-> bath using RW - pt needed cues to maintain use of RW until seated, but did not follow instructions    Activities of Daily Living:  Feeding:  independence    Grooming: declined washing hands following toileting    Lower Body Dressing: set up A to krystal slip on shoe  Toileting: supervision completed all steps of toileting    Cognitive/Visual Perceptual:  Cognitive/Psychosocial Skills:     -       Oriented to: Person, Place, Time, and Situation   -       Follows Commands/attention:Follows two-step commands  -       Communication: clear/fluent  -       Memory: No Deficits noted  -       Safety awareness/insight to disability: impaired   -       Mood/Affect/Coping skills/emotional control: friendly, but preferred to perform mobility tasks per his preference rather than following instructions from therapists    Physical Exam:  Balance: -       sitting = good; standing = SBA with RW  Postural examination/scapula alignment:    -       Rounded shoulders  -       Forward head  -       Posterior  pelvic tilt  Skin integrity: impaired on L foot - bandaged  Edema:  Moderate B feet  Sensation: -       Intact - per pt report  Upper Extremity Range of Motion:   BUEs = WFL  Upper Extremity Strength:  BUEs = WFL   Strength:  BUEs = WFL    AMPAC 6 Click ADL:  AMPAC Total Score: 19    Treatment & Education:  Pt completed ADLs and func mobility activities for tx session as noted above  Pt educated on role of OT and POC      Patient left HOB elevated with all lines intact, call button in reach, bed alarm on, and nurse notified    GOALS:   Multidisciplinary Problems       Occupational Therapy Goals       Not on file                    History:     Past Medical History:   Diagnosis Date    Alcohol abuse     Anasarca 1/28/2019    Anemia     Anticoagulant long-term use     Arthropathy associated with neurological disorder 9/2/2015    Atherosclerosis     Charcot foot due to diabetes mellitus     Chronic combined systolic and diastolic heart failure 01/29/2019 1-28-19 Left VentricleModerate decreased ejection fraction at 30%. Normal left ventricular cavity size. Normal wall thickness observed. Grade I (mild) left ventricular diastolic dysfunction consistent with impaired relaxation. Normal left atrial pressure. Moderate, global hypokinesis(see wall scoring diagram). Right VentricleNormal cavity size, wall thickness and ejection fraction. Wall motion n    Chronic pancreatitis 1/28/2019    CKD (chronic kidney disease) stage 3, GFR 30-59 ml/min     CKD (chronic kidney disease) stage 4, GFR 15-29 ml/min     Colon polyps     approx 5 yrs ago    Coronary artery disease due to calcified coronary lesion 05/08/2015    5 stents on ASA      Diabetic polyneuropathy associated with type 2 diabetes mellitus 9/2/2015    Diverticulosis 1/28/2019    DM type 2 with diabetic peripheral neuropathy 2/4/2019    Encounter for blood transfusion     Essential hypertension 1/28/2019    Former smoker 8/26/2015    Healed ulcer of left foot on  examination 6/20/2017    Hematuria     Hydrocele     approx 1.5 yrs ago    Hyperphosphatemia     Hypoalbuminemia 2/4/2019    Hypocalcemia     Lymphedema of both lower extremities 1/29/2019    Mixed hyperlipidemia 5/8/2015    Morbid obesity with BMI of 50.0-59.9, adult 5/8/2015    Obstruction of right ureteropelvic junction (UPJ) due to stone 5/24/2021    Onychomycosis of multiple toenails with type 2 diabetes mellitus and peripheral neuropathy 6/20/2017    Perianal cyst     approx 2 yrs ago    Proteinuria     Pseudocyst of pancreas 1/28/2019 1-28-19 Liver has a cirrhotic morphology with no focal lesions.  Significant interval increase in ascites when compared to prior exam which may account for patient's abdominal distension.  Hypodense air-fluid collection along the body of the pancreas which is slightly smaller when compared to prior CT.  Findings may relate to pancreatic necrosis with pancreatic pseudocysts with infected pseudocyst    Skin cancer     skin cancer    Sleep apnea 8/26/2015    Status post bariatric surgery 1/11/2016    Type 2 diabetes mellitus, with long-term current use of insulin 5/8/2015    Urinary tract infection          Past Surgical History:   Procedure Laterality Date    ANGIOGRAPHY N/A 6/28/2019    Procedure: ANGIOGRAM-PV STENT;  Surgeon: Ewa Diagnostic Provider;  Location: [facility] OR;  Service: Radiology;  Laterality: N/A;    ANGIOPLASTY      total x5 stents    AORTOGRAPHY WITH SERIALOGRAPHY N/A 11/26/2024    Procedure: AORTOGRAM, WITH SERIALOGRAPHY;  Surgeon: Clinton Gibbs MD;  Location: [facility] CATH LAB/EP;  Service: Cardiology;  Laterality: N/A;    COLONOSCOPY N/A 10/6/2015    Procedure: COLONOSCOPY;  Surgeon: Shekhar Richards MD;  Location: [facility] ENDO (2ND FLR);  Service: Endoscopy;  Laterality: N/A;  BMI over 55/2nd floor case    5 day hold Plavix, Dr Kwadwo Arroyo    COLONOSCOPY N/A 5/13/2021    Procedure: COLONOSCOPY;  Surgeon: Huan Brumfield MD;  Location: [facility] ENDO;  Service:  Endoscopy;  Laterality: N/A;    CORONARY ANGIOGRAPHY Right 3/20/2019    Procedure: ANGIOGRAM, CORONARY ARTERY;  Surgeon: Bob Duque MD;  Location: [facility] CATH LAB;  Service: Cardiology;  Laterality: Right;    CORONARY ARTERY BYPASS GRAFT  2017    x3    CORONARY BYPASS GRAFT ANGIOGRAPHY  3/20/2019    Procedure: Bypass graft study;  Surgeon: Bob Duque MD;  Location: [facility] CATH LAB;  Service: Cardiology;;    CYST REMOVAL      CYSTOSCOPY Right 6/30/2021    Procedure: CYSTOSCOPY;  Surgeon: William Diaz MD;  Location: [facility] OR 1ST FLR;  Service: Urology;  Laterality: Right;    CYSTOSCOPY N/A 10/16/2023    Procedure: CYSTOSCOPY;  Surgeon: Chrystal Dias MD;  [facility];  Service: Urology;  Laterality: N/A;    CYSTOSCOPY N/A 12/6/2023    Procedure: CYSTOSCOPY;  Surgeon: William Diaz MD;  [facility];  Service: Urology;  Laterality: N/A;    CYSTOSCOPY W/ URETERAL STENT PLACEMENT Right 6/16/2021    Procedure: CYSTOSCOPY, WITH URETERAL STENT INSERTION;  Surgeon: William Diaz MD;  Location: [facility] OR 2ND FLR;  Service: Urology;  Laterality: Right;  FLUORO LESS THAN 1 HOUR    ENDOSCOPIC ULTRASOUND OF UPPER GASTROINTESTINAL TRACT N/A 2/26/2020    Procedure: ULTRASOUND, UPPER GI TRACT, ENDOSCOPIC;  Surgeon: Robbei Yang MD;  Location: [facility];  Service: Endoscopy;  Laterality: N/A;    ESOPHAGOGASTRODUODENOSCOPY N/A 7/8/2019    Procedure: ESOPHAGOGASTRODUODENOSCOPY (EGD);  Surgeon: Huan Brumfield MD;  Location: [facility];  Service: Endoscopy;  Laterality: N/A;    ESOPHAGOGASTRODUODENOSCOPY N/A 5/13/2021    Procedure: EGD (ESOPHAGOGASTRODUODENOSCOPY);  Surgeon: Huan Brumfield MD;  Location: [facility];  Service: Endoscopy;  Laterality: N/A;    EXCISION OF HYDROCELE Bilateral 6/16/2021    Procedure: HYDROCELE REPAIR;  Surgeon: William Diaz MD;  Location: [facility] OR 2ND FLR;  Service: Urology;  Laterality: Bilateral;  2 HOURS    EXTRACTION - STONE Right 12/6/2023     Procedure: EXTRACTION - STONE;  Surgeon: William Diaz MD;  Location: [facility] OR 1ST FLR;  Service: Urology;  Laterality: Right;    FLUOROSCOPY N/A 6/16/2021    Procedure: FLUOROSCOPY;  Surgeon: William Diaz MD;  Location: [facility] OR 2ND FLR;  Service: Urology;  Laterality: N/A;    GASTRECTOMY      INSERTION OF DIALYSIS CATHETER N/A 5/17/2019    Procedure: pleurx;  Surgeon: [facility] Diagnostic Provider;  Location: [facility] OR 2ND FLR;  Service: General;  Laterality: N/A;  Room 188/[place]    KNEE ARTHROSCOPY      LAPAROSCOPIC CHOLECYSTECTOMY N/A 5/4/2020    Procedure: CHOLECYSTECTOMY, LAPAROSCOPIC;  Surgeon: SON Rowe MD;  Location: [facility] OR;  Service: General;  Laterality: N/A;    LASER LITHOTRIPSY N/A 6/30/2021    Procedure: LITHOTRIPSY, USING LASER;  Surgeon: William Diaz MD;  Location: [facility] OR 1ST FLR;  Service: Urology;  Laterality: N/A;    LIVER BIOPSY N/A 5/4/2020    Procedure: BIOPSY, LIVER, Laproscopic ;  Surgeon: SON Rowe MD;  Location: [facility] OR;  Service: General;  Laterality: N/A;    perianal surgery      perianal cyst removed    PYELOSCOPY Right 6/30/2021    Procedure: PYELOSCOPY;  Surgeon: William Diaz MD;  Location: [facility] OR 1ST FLR;  Service: Urology;  Laterality: Right;    PYELOSCOPY Right 10/16/2023    Procedure: PYELOSCOPY;  Surgeon: Chrystal Dias MD;  Location: [facility] OR 1ST FLR;  Service: Urology;  Laterality: Right;    PYELOSCOPY Right 12/6/2023    Procedure: PYELOSCOPY;  Surgeon: William Diaz MD;  Location: [facility] OR 1ST FLR;  Service: Urology;  Laterality: Right;    REMOVAL-STENT Right 12/6/2023    Procedure: REMOVAL-STENT;  Surgeon: William Diaz MD;  Location: [facility] OR 1ST FLR;  Service: Urology;  Laterality: Right;    REPLACEMENT OF STENT Right 6/30/2021    Procedure: REPLACEMENT, STENT;  Surgeon: William Diaz MD;  Location: [facility] OR 1ST FLR;  Service: Urology;  Laterality: Right;    RETROGRADE PYELOGRAPHY Right 10/16/2023    Procedure: PYELOGRAM, RETROGRADE;   Surgeon: Chrystal Dias MD;  Location: [facility] OR [address] [place] [address];  Service: Urology;  Laterality: Right;    RETROGRADE PYELOGRAPHY Right 12/6/2023    Procedure: PYELOGRAM, RETROGRADE;  Surgeon: William Diaz MD;  [facility];  Service: Urology;  Laterality: Right;    URETERAL STENT PLACEMENT Right 10/16/2023    Procedure: INSERTION, STENT, URETER;  Surgeon: Chrystal Dias MD;  [facility];  Service: Urology;  Laterality: Right;    URETEROSCOPY Right 6/30/2021    Procedure: URETEROSCOPY FLEXIBLE URETEROSCOPE;  Surgeon: William Diaz MD;  Location: [facility] OR [address] [place] [address];  Service: Urology;  Laterality: Right;    URETEROSCOPY Right 10/16/2023    Procedure: URETEROSCOPY;  Surgeon: Chrystal Dias MD;  Location: [facility] OR [address] [place] [address];  Service: Urology;  Laterality: Right;    URETEROSCOPY Right 12/6/2023    Procedure: URETEROSCOPY;  Surgeon: William Diaz MD;  Location: [facility] OR [address] [place] [address];  Service: Urology;  Laterality: Right;       Time Tracking:     OT Date of Treatment: 01/05/25  OT Start Time: 0928  OT Stop Time: 1001  OT Total Time (min): 33 min - cotx with PT    Billable Minutes:Evaluation 10  Self Care/Home Management 13  Therapeutic Activity 10    1/5/2025

## 2025-01-05 NOTE — PLAN OF CARE
"  Problem: Physical Therapy  Goal: Physical Therapy Goal  Description: Goals to be met by: 25     Patient will increase functional independence with mobility by performin. Gait  x 15 feet with Stand-by Assistance and maintaining weight-bearing precaution(s) using Rolling Walker.     2025 1010 by Nathalie Sandoval PT  Outcome: Adequate for Care Transition     PT Eval completed, note to follow.     Pt educated on WBAT LLE, emphasis to heel touch in post op shoe per podiatry. Obtained and fitted pt for forefoot offloading darco to promote heel WB. Pt utilized RW and shoe to ambulate to/from restroom with SBA. Pt has poor receptiveness to learning and poor safety awareness. Pt declined practicing ascending/descending an 8" step in effort to assist with safe transition home with stair negotiation and was noncompliant with safe transitions sit <> stand with RW. Pt pushed RW away upon approach to bed. Pt did not appear interested in therapist's recommendations for safe functional mobility. For these reasons, pt is a poor candidate for skilled acute PT services. Will d/c PT orders. Recommending d/c home with low intensity therapy ( PT/OT). Pt reports he is able to stay on the 1st floor of home.   "

## 2025-01-05 NOTE — PROGRESS NOTES
[facility]  Podiatry  Progress Note    Patient Name: Jian Arrieta  MRN: [id number]  Admission Date: 1/3/2025  Hospital Length of Stay: 1 days  Attending Physician: Mohinder Jimenes,*  Primary Care Provider: Reza Boyer MD     Subjective:     Interval History: NAEON, NAD, VSS. Patient is afebrile, dressings are clean/dry/ and intact.   New pedal complaints: None  New results: GPC on gram stain.   Denies post op fever.       Follow-up For: Procedure(s) (LRB):  AMPUTATION, TOE (Left)    Post-Operative Day: 1 Day Post-Op    Scheduled Meds:   aspirin  81 mg Oral Daily    atorvastatin  80 mg Oral QHS    calcitRIOL  0.25 mcg Oral Daily    clopidogreL  75 mg Oral Daily    isosorbide mononitrate  30 mg Oral Daily    metoprolol succinate  50 mg Oral BID    mirtazapine  7.5 mg Oral QHS    piperacillin-tazobactam (Zosyn) IV (PEDS and ADULTS) (extended infusion is not appropriate)  4.5 g Intravenous Q12H    torsemide  20 mg Oral Daily     Continuous Infusions:   insulin regular  1.6 Units/hr Subcutaneous Continuous 0.02 mL/hr at 01/03/25 1930 1.6 Units/hr at 01/03/25 1930     PRN Meds:  Current Facility-Administered Medications:     dextrose 50%, 12.5 g, Intravenous, PRN    dextrose 50%, 12.5 g, Intravenous, PRN    dextrose 50%, 25 g, Intravenous, PRN    dextrose 50%, 25 g, Intravenous, PRN    glucagon (human recombinant), 1 mg, Intramuscular, PRN    glucagon (human recombinant), 1 mg, Intramuscular, PRN    glucose, 16 g, Oral, PRN    glucose, 16 g, Oral, PRN    glucose, 24 g, Oral, PRN    glucose, 24 g, Oral, PRN    naloxone, 0.02 mg, Intravenous, PRN    ondansetron, 4 mg, Intravenous, Q8H PRN    oxyCODONE-acetaminophen, 1 tablet, Oral, Q6H PRN    sodium chloride 0.9%, 10 mL, Intravenous, Q12H PRN    Pharmacy to dose Vancomycin consult, , , Once **AND** vancomycin - pharmacy to dose, , Intravenous, pharmacy to manage frequency    Review of Systems   Constitutional:  Negative for chills and fever.  "  Respiratory:  Negative for shortness of breath.    Cardiovascular:  Negative for chest pain.   Gastrointestinal:  Negative for diarrhea, nausea and vomiting.   Skin:  Positive for wound.     Objective:     Vital Signs (Most Recent):  Temp: 98.2 °F (36.8 °C) (01/04/25 1546)  Pulse: 82 (01/04/25 1546)  Resp: 18 (01/04/25 1546)  BP: 125/68 (01/04/25 1546)  SpO2: 98 % (01/04/25 1546) Vital Signs (24h Range):  Temp:  [97.5 °F (36.4 °C)-98.4 °F (36.9 °C)] 98.2 °F (36.8 °C)  Pulse:  [74-82] 82  Resp:  [17-20] 18  SpO2:  [96 %-100 %] 98 %  BP: (108-125)/(62-73) 125/68     Weight: 108.4 kg (238 lb 15.7 oz)  Body mass index is 37.43 kg/m².    Foot Exam    General  Orientation: alert and oriented to person, place, and time   Affect: appropriate       Left Foot/Ankle      Inspection and Palpation  Tenderness: none   Swelling: (diffuse LLE, increased 2nd digit)  Skin Exam: drainage and ulcer; no blister     Neurovascular  Dorsalis pedis: absent  Posterior tibial: absent  Saphenous nerve sensation: diminished  Tibial nerve sensation: diminished  Superficial peroneal nerve sensation: diminished  Deep peroneal nerve sensation: diminished  Sural nerve sensation: diminished    Tests  Osmel's sign: negative    Comments  S/p left foot 2nd digit amputation. Sutures clean dry and intact. Proximal aspect of incision left open. With sanguinous drainage noted on packing strip.   No malodor. No purulence noted on manual expression.            Laboratory:  A1C:   Recent Labs   Lab 11/15/24  0956 01/04/25  0637   HGBA1C 7.9* 9.7*     CBC:   Recent Labs   Lab 01/04/25  0637   WBC 6.03   RBC 2.98*   HGB 8.8*   HCT 27.3*      MCV 92   MCH 29.5   MCHC 32.2     CMP:   Recent Labs   Lab 01/04/25  0637   *   CALCIUM 7.7*   ALBUMIN 2.1*   PROT 6.4      K 4.1   CO2 28      BUN 42*   CREATININE 3.7*   ALKPHOS 153*   ALT 62*   AST 69*   BILITOT 0.3     CRP: No results for input(s): "CRP" in the last 168 hours.  ESR: No results " "for input(s): "SEDRATE" in the last 168 hours.  Wound Cultures: No results for input(s): "LABAERO" in the last 4320 hours.  Microbiology Results (last 7 days)       Procedure Component Value Units Date/Time    Gram stain [[id number]] Collected: 01/03/25 1536    Order Status: Completed Specimen: Incision site from Toe, Left Foot Updated: 01/04/25 0511     Gram Stain Result Rare WBC's      Moderate Gram positive cocci    Narrative:      Bone left second toe    Aerobic culture [[id number]] Collected: 01/03/25 1536    Order Status: Sent Specimen: Incision site from Toe, Left Foot Updated: 01/03/25 2012    Fungus culture [[id number]] Collected: 01/03/25 1536    Order Status: Sent Specimen: Incision site from Toe, Left Foot Updated: 01/03/25 2012    AFB Culture & Smear [[id number]] Collected: 01/03/25 1536    Order Status: Sent Specimen: Incision site from Toe, Left Foot Updated: 01/03/25 2012    Culture, Anaerobe [[id number]] Collected: 01/03/25 1536    Order Status: Sent Specimen: Incision site from Toe, Left Foot Updated: 01/03/25 2012          Specimen (24h ago, onward)      None            Diagnostic Results:  I have reviewed all pertinent imaging results/findings within the past 24 hours.  Assessment/Plan:     Endocrine  Type 2 diabetes mellitus with left diabetic foot infection  S/p L 2nd partial ray amputation DOS 1/3/25    - Left foot dressed, wound care orders to follow.  - Cultures pending, abx per primary.   - glucose management per primary  - WBAT LLE, emphasis to heel touch in post op shoe  - podiatry will follow    Future DC recs  - Patient to follow up with podiatry, podiatry to arrange.  - SNF or HH to change dressings 3x weekly as follows: Cleanse left foot with saline, then apply packing strip to proximal incision site, then gauze, kerlix, and ace wrap.  - WBAT to LLE in post op shoe  - Abx per primary.         Hernan Garcia DPM  Podiatry  [facility]    "

## 2025-01-05 NOTE — ASSESSMENT & PLAN NOTE
Patient's FSGs are uncontrolled due to hyperglycemia on current medication regimen.  Last A1c reviewed-   Lab Results   Component Value Date    HGBA1C 9.7 (H) 01/04/2025     Most recent fingerstick glucose reviewed-   Recent Labs   Lab 01/04/25 2033 01/04/25  2212 01/05/25  0226 01/05/25  0559   POCTGLUCOSE 143* 285* 220* 159*       Current correctional scale   home dosing insulin pump.  Currently at 1.6 units per hour with bolus dosing per pump.  Maintain anti-hyperglycemic dose as follows- continue home dose insulin pump.  If uncontrolled would DC his insulin pump and start basal and bolus dosing  Antihyperglycemics (From admission, onward)    Start     Stop Route Frequency Ordered    01/03/25 1930  insulin regular insulin pump from home        Question Answer Comment   Target number 150    Basal Rate #1 5    Basal rate #1 time 1730        -- SubQ Continuous 01/03/25 1829        Hold Oral hypoglycemics while patient is in the hospital.

## 2025-01-05 NOTE — SUBJECTIVE & OBJECTIVE
Past Medical History:   Diagnosis Date    Alcohol abuse     Anasarca 1/28/2019    Anemia     Anticoagulant long-term use     Arthropathy associated with neurological disorder 9/2/2015    Atherosclerosis     Charcot foot due to diabetes mellitus     Chronic combined systolic and diastolic heart failure 01/29/2019 1-28-19 Left VentricleModerate decreased ejection fraction at 30%. Normal left ventricular cavity size. Normal wall thickness observed. Grade I (mild) left ventricular diastolic dysfunction consistent with impaired relaxation. Normal left atrial pressure. Moderate, global hypokinesis(see wall scoring diagram). Right VentricleNormal cavity size, wall thickness and ejection fraction. Wall motion n    Chronic pancreatitis 1/28/2019    CKD (chronic kidney disease) stage 3, GFR 30-59 ml/min     CKD (chronic kidney disease) stage 4, GFR 15-29 ml/min     Colon polyps     approx 5 yrs ago    Coronary artery disease due to calcified coronary lesion 05/08/2015    5 stents on ASA      Diabetic polyneuropathy associated with type 2 diabetes mellitus 9/2/2015    Diverticulosis 1/28/2019    DM type 2 with diabetic peripheral neuropathy 2/4/2019    Encounter for blood transfusion     Essential hypertension 1/28/2019    Former smoker 8/26/2015    Healed ulcer of left foot on examination 6/20/2017    Hematuria     Hydrocele     approx 1.5 yrs ago    Hyperphosphatemia     Hypoalbuminemia 2/4/2019    Hypocalcemia     Lymphedema of both lower extremities 1/29/2019    Mixed hyperlipidemia 5/8/2015    Morbid obesity with BMI of 50.0-59.9, adult 5/8/2015    Obstruction of right ureteropelvic junction (UPJ) due to stone 5/24/2021    Onychomycosis of multiple toenails with type 2 diabetes mellitus and peripheral neuropathy 6/20/2017    Perianal cyst     approx 2 yrs ago    Proteinuria     Pseudocyst of pancreas 1/28/2019 1-28-19 Liver has a cirrhotic morphology with no focal lesions.  Significant interval increase in ascites  when compared to prior exam which may account for patient's abdominal distension.  Hypodense air-fluid collection along the body of the pancreas which is slightly smaller when compared to prior CT.  Findings may relate to pancreatic necrosis with pancreatic pseudocysts with infected pseudocyst    Skin cancer     skin cancer    Sleep apnea 8/26/2015    Status post bariatric surgery 1/11/2016    Type 2 diabetes mellitus, with long-term current use of insulin 5/8/2015    Urinary tract infection        Past Surgical History:   Procedure Laterality Date    ANGIOGRAPHY N/A 6/28/2019    Procedure: ANGIOGRAM-PV STENT;  Surgeon: Ewa Diagnostic Provider;  Location: [facility] OR;  Service: Radiology;  Laterality: N/A;    ANGIOPLASTY      total x5 stents    AORTOGRAPHY WITH SERIALOGRAPHY N/A 11/26/2024    Procedure: AORTOGRAM, WITH SERIALOGRAPHY;  Surgeon: Clinton Gibbs MD;  Location: [facility] CATH LAB/EP;  Service: Cardiology;  Laterality: N/A;    COLONOSCOPY N/A 10/6/2015    Procedure: COLONOSCOPY;  Surgeon: Shekhar Richards MD;  Location: [facility] (2ND FLR);  Service: Endoscopy;  Laterality: N/A;  BMI over 55/2nd floor case    5 day hold Plavix, Dr Kwadwo Arroyo    COLONOSCOPY N/A 5/13/2021    Procedure: COLONOSCOPY;  Surgeon: Huan Brumfield MD;  Location: [facility];  Service: Endoscopy;  Laterality: N/A;    CORONARY ANGIOGRAPHY Right 3/20/2019    Procedure: ANGIOGRAM, CORONARY ARTERY;  Surgeon: Bob Duque MD;  Location: [facility] CATH LAB;  Service: Cardiology;  Laterality: Right;    CORONARY ARTERY BYPASS GRAFT  2017    x3    CORONARY BYPASS GRAFT ANGIOGRAPHY  3/20/2019    Procedure: Bypass graft study;  Surgeon: Bob Duque MD;  Location: [facility] CATH LAB;  Service: Cardiology;;    CYST REMOVAL      CYSTOSCOPY Right 6/30/2021    Procedure: CYSTOSCOPY;  Surgeon: William Diaz MD;  Location: [facility] 1ST FLR;  Service: Urology;  Laterality: Right;    CYSTOSCOPY N/A 10/16/2023    Procedure: CYSTOSCOPY;  Surgeon:  Chrystal Dias MD;  Location: [facility] OR 1ST FLR;  Service: Urology;  Laterality: N/A;    CYSTOSCOPY N/A 12/6/2023    Procedure: CYSTOSCOPY;  Surgeon: William Diaz MD;  Location: [facility] OR 1ST FLR;  Service: Urology;  Laterality: N/A;    CYSTOSCOPY W/ URETERAL STENT PLACEMENT Right 6/16/2021    Procedure: CYSTOSCOPY, WITH URETERAL STENT INSERTION;  Surgeon: William Diaz MD;  Location: [facility] OR 2ND FLR;  Service: Urology;  Laterality: Right;  FLUORO LESS THAN 1 HOUR    ENDOSCOPIC ULTRASOUND OF UPPER GASTROINTESTINAL TRACT N/A 2/26/2020    Procedure: ULTRASOUND, UPPER GI TRACT, ENDOSCOPIC;  Surgeon: Robbie Yang MD;  Location: [facility];  Service: Endoscopy;  Laterality: N/A;    ESOPHAGOGASTRODUODENOSCOPY N/A 7/8/2019    Procedure: ESOPHAGOGASTRODUODENOSCOPY (EGD);  Surgeon: Huan Brumfield MD;  Location: [facility];  Service: Endoscopy;  Laterality: N/A;    ESOPHAGOGASTRODUODENOSCOPY N/A 5/13/2021    Procedure: EGD (ESOPHAGOGASTRODUODENOSCOPY);  Surgeon: Huan Brumfield MD;  Location: [facility];  Service: Endoscopy;  Laterality: N/A;    EXCISION OF HYDROCELE Bilateral 6/16/2021    Procedure: HYDROCELE REPAIR;  Surgeon: William Diaz MD;  [facility];  Service: Urology;  Laterality: Bilateral;  2 HOURS    EXTRACTION - STONE Right 12/6/2023    Procedure: EXTRACTION - STONE;  Surgeon: William Diaz MD;  Location: [facility] OR 1ST FLR;  Service: Urology;  Laterality: Right;    FLUOROSCOPY N/A 6/16/2021    Procedure: FLUOROSCOPY;  Surgeon: William Diaz MD;  Location: [facility] OR 2ND FLR;  Service: Urology;  Laterality: N/A;    GASTRECTOMY      INSERTION OF DIALYSIS CATHETER N/A 5/17/2019    Procedure: pleurx;  Surgeon: Ewa Diagnostic Provider;  Location: [facility] OR 2ND FLR;  Service: General;  Laterality: N/A;  Room 188/[place]    KNEE ARTHROSCOPY      LAPAROSCOPIC CHOLECYSTECTOMY N/A 5/4/2020    Procedure: CHOLECYSTECTOMY, LAPAROSCOPIC;  Surgeon: SON Rowe MD;  Location: [facility];  Service:  General;  Laterality: N/A;    LASER LITHOTRIPSY N/A 6/30/2021    Procedure: LITHOTRIPSY, USING LASER;  Surgeon: William Diaz MD;  Location: [facility] OR 1ST FLR;  Service: Urology;  Laterality: N/A;    LIVER BIOPSY N/A 5/4/2020    Procedure: BIOPSY, LIVER, Laproscopic ;  Surgeon: SON Rowe MD;  Location: [facility];  Service: General;  Laterality: N/A;    perianal surgery      perianal cyst removed    PYELOSCOPY Right 6/30/2021    Procedure: PYELOSCOPY;  Surgeon: William Diaz MD;  Location: [facility] OR 1ST FLR;  Service: Urology;  Laterality: Right;    PYELOSCOPY Right 10/16/2023    Procedure: PYELOSCOPY;  Surgeon: Chrystal Dias MD;  Location: [facility] OR 1ST FLR;  Service: Urology;  Laterality: Right;    PYELOSCOPY Right 12/6/2023    Procedure: PYELOSCOPY;  Surgeon: William Diaz MD;  Location: [facility] OR 1ST FLR;  Service: Urology;  Laterality: Right;    REMOVAL-STENT Right 12/6/2023    Procedure: REMOVAL-STENT;  Surgeon: William Diaz MD;  Location: [facility] OR 1ST FLR;  Service: Urology;  Laterality: Right;    REPLACEMENT OF STENT Right 6/30/2021    Procedure: REPLACEMENT, STENT;  Surgeon: William Diaz MD;  Location: [facility] OR 1ST FLR;  Service: Urology;  Laterality: Right;    RETROGRADE PYELOGRAPHY Right 10/16/2023    Procedure: PYELOGRAM, RETROGRADE;  Surgeon: Chrystal Dias MD;  Location: [facility] OR 1ST FLR;  Service: Urology;  Laterality: Right;    RETROGRADE PYELOGRAPHY Right 12/6/2023    Procedure: PYELOGRAM, RETROGRADE;  Surgeon: William Diaz MD;  Location: [facility] OR 1ST FLR;  Service: Urology;  Laterality: Right;    URETERAL STENT PLACEMENT Right 10/16/2023    Procedure: INSERTION, STENT, URETER;  Surgeon: Chrystal Dias MD;  Location: [facility] OR 1ST FLR;  Service: Urology;  Laterality: Right;    URETEROSCOPY Right 6/30/2021    Procedure: URETEROSCOPY FLEXIBLE URETEROSCOPE;  Surgeon: William Diaz MD;  Location: [facility] OR [address] [place] [address];  Service: Urology;  Laterality: Right;    URETEROSCOPY  Right 10/16/2023    Procedure: URETEROSCOPY;  Surgeon: Chrystal Dias MD;  Location: [facility] OR [address] [place] [address];  Service: Urology;  Laterality: Right;    URETEROSCOPY Right 12/6/2023    Procedure: URETEROSCOPY;  Surgeon: William Diaz MD;  Location: [facility] OR [address] [place] [address];  Service: Urology;  Laterality: Right;       Review of patient's allergies indicates:  No Known Allergies    Medications:  Medications Prior to Admission   Medication Sig    calcitRIOL (ROCALTROL) 0.25 MCG Cap Take 0.25 mcg by mouth once daily.    isosorbide mononitrate (IMDUR) 30 MG 24 hr tablet Take 1 tablet (30 mg total) by mouth once daily.    [DISCONTINUED] furosemide (LASIX) 40 MG tablet Take 40 mg by mouth once daily.    acetaminophen (TYLENOL) 325 MG tablet Take 2 tablets (650 mg total) by mouth every 8 (eight) hours as needed for Pain.    amLODIPine (NORVASC) 10 MG tablet Take 1 tablet by mouth once daily.    aspirin (ECOTRIN) 81 MG EC tablet Take 1 tablet (81 mg total) by mouth once daily.    blood pressure monitor Kit 1 kit by Misc.(Non-Drug; Combo Route) route 2 (two) times a day.    blood-glucose sensor (DEXCOM G6 SENSOR) Sandi Inject 1 each into the skin every 10 days.    blood-glucose transmitter (DEXCOM G6 TRANSMITTER) Sandi Inject 1 each into the skin every 10 days.    clopidogreL (PLAVIX) 75 mg tablet Take 1 tablet (75 mg total) by mouth once daily.    glucagon (BAQSIMI) 3 mg/actuation Spry 1 each by Nasal route daily as needed (if glucose is less than 70 - can repeat in 1 hour if still low/less than 70).    HUMALOG KWIKPEN INSULIN 100 unit/mL pen Inject 5 Units into the skin before meals as needed (before each meal - if OFF of insulin pump). This is emergency back up insulin to use with meals if OFF of insulin pump    insulin detemir U-100, Levemir, 100 unit/mL (3 mL) SubQ InPn pen Inject 30 Units into the skin every evening. This is emergency back up insulin only if OFF of your insulin pump    insulin lispro-aabc (LYUMJEV U-100  "INSULIN) 100 unit/mL Inject 80 Units into the skin continuous. Uses up to 80 units daily with omnipod 5 insulin pump as directed.    insulin pump cart,automated,BT (OMNIPOD 5 G6 PODS, GEN 5,) Crtg INJECT 1 EACH INTO THE SKIN EVERY OTHER DAY.    metoprolol succinate (TOPROL-XL) 50 MG 24 hr tablet Take 50 mg by mouth 2 (two) times daily.    rosuvastatin (CRESTOR) 20 MG tablet Take 20 mg by mouth once daily.    torsemide (DEMADEX) 20 MG Tab Take 1 tablet (20 mg total) by mouth once daily.    [DISCONTINUED] blood sugar diagnostic (ONETOUCH VERIO TEST STRIPS) Strp 1 each by Misc.(Non-Drug; Combo Route) route 2 (two) times a day.    [DISCONTINUED] lancets (ONETOUCH DELICA PLUS LANCET) 33 gauge Misc 1 lancet  by Misc.(Non-Drug; Combo Route) route 2 (two) times daily.    [DISCONTINUED] pen needle, diabetic 32 gauge x 5/32" Ndle Use with toujeo insulin one daily only as Emergency use/back up insulin - if OFF OF your insulin pump.     Antibiotics (From admission, onward)      Start     Stop Route Frequency Ordered    01/03/25 2100  piperacillin-tazobactam (ZOSYN) 4.5 g in D5W 100 mL IVPB (MB+)         -- IV Every 12 hours (non-standard times) 01/03/25 1750    01/03/25 1850  vancomycin - pharmacy to dose  (vancomycin IVPB (PEDS and ADULTS))        Placed in "And" Linked Group    -- IV pharmacy to manage frequency 01/03/25 1750          Antifungals (From admission, onward)      None          Antivirals (From admission, onward)      None             Immunization History   Administered Date(s) Administered    COVID-19, MRNA, LN-S, PF (MODERNA FULL 0.5 ML DOSE) 01/27/2021, 02/24/2021, 11/22/2021    Influenza 10/31/2011, 12/03/2014    Influenza (FLUAD) - Quadrivalent - Adjuvanted - PF *Preferred* (65+) 09/30/2020, 10/27/2023    Influenza - Quadrivalent - PF *Preferred* (6 months and older) 10/31/2011, 12/03/2014    Influenza - Trivalent - Fluad - Adjuvanted - PF (65 years and older 11/28/2024    Influenza - Trivalent - Fluzone High " Dose - PF (65 years and older) 11/15/2019    Influenza A (H1N1) 2009 Monovalent - IM 2009    PPD Test 2019    Pneumococcal Polysaccharide - 23 Valent 10/26/2020    Tdap 2017       Family History       Problem Relation (Age of Onset)    Cancer Mother, Father, Paternal Grandfather, Brother    Diabetes Maternal Grandmother    Heart disease Father    No Known Problems Paternal Grandmother    Obesity Sister    Parkinsonism Brother    Stroke Maternal Grandfather          Social History     Socioeconomic History    Marital status:    Tobacco Use    Smoking status: Former     Current packs/day: 0.00     Average packs/day: 2.0 packs/day for 30.0 years (60.0 ttl pk-yrs)     Types: Cigarettes     Start date: 1975     Quit date: 2005     Years since quittin.9    Smokeless tobacco: Never   Substance and Sexual Activity    Alcohol use: No     Comment: started ~, reports 1 shot daily, max 3 shots daily, vague about alcohol consumption. Last drink 2018    Drug use: No     Social Drivers of Health     Financial Resource Strain: Patient Declined (1/3/2025)    Overall Financial Resource Strain (CARDIA)     Difficulty of Paying Living Expenses: Patient declined   Food Insecurity: Patient Declined (1/3/2025)    Hunger Vital Sign     Worried About Running Out of Food in the Last Year: Patient declined     Ran Out of Food in the Last Year: Patient declined   Transportation Needs: Patient Declined (1/3/2025)    TRANSPORTATION NEEDS     Transportation : Patient declined   Physical Activity: Patient Declined (2024)    Exercise Vital Sign     Days of Exercise per Week: Patient declined     Minutes of Exercise per Session: Patient declined   Stress: Patient Declined (1/3/2025)    [employer] - Occupational Stress Questionnaire     Feeling of Stress : Patient declined   Housing Stability: Patient Declined (1/3/2025)    Housing Stability Vital Sign     Unable to Pay  for Housing in the Last Year: Patient declined     Homeless in the Last Year: Patient declined     Review of Systems   Constitutional:  Negative for chills and fever.   Respiratory:  Negative for shortness of breath.    Cardiovascular:  Negative for chest pain.   Gastrointestinal:  Negative for diarrhea, nausea and vomiting.   Skin:  Positive for wound.     Objective:     Vital Signs (Most Recent):  Temp: 97.9 °F (36.6 °C) (01/05/25 1116)  Pulse: 70 (01/05/25 1116)  Resp: 18 (01/05/25 1116)  BP: 119/66 (01/05/25 1116)  SpO2: 96 % (01/05/25 1116) Vital Signs (24h Range):  Temp:  [97.9 °F (36.6 °C)-98.9 °F (37.2 °C)] 97.9 °F (36.6 °C)  Pulse:  [61-82] 70  Resp:  [17-18] 18  SpO2:  [96 %-98 %] 96 %  BP: ()/(54-68) 119/66     Weight: 108.4 kg (238 lb 15.7 oz)  Body mass index is 37.43 kg/m².    Estimated Creatinine Clearance: 21.2 mL/min (A) (based on SCr of 3.8 mg/dL (H)).     Physical Exam  Constitutional:       Appearance: Normal appearance.   HENT:      Head: Normocephalic.      Mouth/Throat:      Mouth: Mucous membranes are moist.      Pharynx: Oropharynx is clear.   Eyes:      Pupils: Pupils are equal, round, and reactive to light.   Cardiovascular:      Rate and Rhythm: Normal rate and regular rhythm.      Pulses: Normal pulses.      Heart sounds: Normal heart sounds.   Pulmonary:      Effort: Pulmonary effort is normal.      Breath sounds: Normal breath sounds.   Abdominal:      General: Bowel sounds are normal.      Palpations: Abdomen is soft.   Musculoskeletal:         General: Normal range of motion.      Cervical back: Normal range of motion.   Skin:     General: Skin is warm and dry.      Capillary Refill: Capillary refill takes less than 2 seconds.      Comments: Left foot bandaged postoperatively   Neurological:      General: No focal deficit present.      Mental Status: He is alert and oriented to person, place, and time. Mental status is at baseline.   Psychiatric:         Mood and Affect: Mood  normal.         Behavior: Behavior normal.          Significant Labs: All pertinent labs within the past 24 hours have been reviewed.    Significant Imaging: I have reviewed all pertinent imaging results/findings within the past 24 hours.

## 2025-01-05 NOTE — NURSING
Consulted for midline placement. Verified with Dr. Jimenes that he wanted a midline instead of a PICC. He stated that patient should hopefully only have a few more days of ABX instead of weeks. Continue with midline patient per Dr. Jimenes.

## 2025-01-06 VITALS
RESPIRATION RATE: 20 BRPM | TEMPERATURE: 98 F | WEIGHT: 239 LBS | OXYGEN SATURATION: 88 % | HEIGHT: 67 IN | SYSTOLIC BLOOD PRESSURE: 112 MMHG | HEART RATE: 73 BPM | BODY MASS INDEX: 37.51 KG/M2 | DIASTOLIC BLOOD PRESSURE: 79 MMHG

## 2025-01-06 DIAGNOSIS — E13.9 LADA (LATENT AUTOIMMUNE DIABETES IN ADULTS), MANAGED AS TYPE 1: ICD-10-CM

## 2025-01-06 LAB
ACID FAST MOD KINY STN SPEC: NORMAL
BACTERIA SPEC AEROBE CULT: ABNORMAL
BACTERIA SPEC AEROBE CULT: ABNORMAL
MYCOBACTERIUM SPEC QL CULT: NORMAL
POCT GLUCOSE: 124 MG/DL (ref 70–110)
POCT GLUCOSE: 224 MG/DL (ref 70–110)
POCT GLUCOSE: 256 MG/DL (ref 70–110)

## 2025-01-06 PROCEDURE — 25000003 PHARM REV CODE 250: Performed by: REGISTERED NURSE

## 2025-01-06 PROCEDURE — 25000003 PHARM REV CODE 250: Performed by: HOSPITALIST

## 2025-01-06 PROCEDURE — 63600175 PHARM REV CODE 636 W HCPCS: Performed by: HOSPITALIST

## 2025-01-06 PROCEDURE — 99232 SBSQ HOSP IP/OBS MODERATE 35: CPT | Mod: ,,, | Performed by: INTERNAL MEDICINE

## 2025-01-06 RX ORDER — CEFEPIME HYDROCHLORIDE 1 G/1
1 INJECTION, POWDER, FOR SOLUTION INTRAMUSCULAR; INTRAVENOUS DAILY
Start: 2025-01-06

## 2025-01-06 RX ORDER — OXYCODONE AND ACETAMINOPHEN 5; 325 MG/1; MG/1
1 TABLET ORAL EVERY 6 HOURS PRN
Qty: 5 TABLET | Refills: 0 | Status: SHIPPED | OUTPATIENT
Start: 2025-01-06 | End: 2025-01-06

## 2025-01-06 RX ORDER — CEFEPIME HYDROCHLORIDE 1 G/1
1 INJECTION, POWDER, FOR SOLUTION INTRAMUSCULAR; INTRAVENOUS
Status: DISCONTINUED | OUTPATIENT
Start: 2025-01-06 | End: 2025-01-06 | Stop reason: HOSPADM

## 2025-01-06 RX ORDER — CEFEPIME HYDROCHLORIDE 1 G/1
1 INJECTION, POWDER, FOR SOLUTION INTRAMUSCULAR; INTRAVENOUS EVERY 12 HOURS
Start: 2025-01-06 | End: 2025-01-06

## 2025-01-06 RX ORDER — OXYCODONE AND ACETAMINOPHEN 5; 325 MG/1; MG/1
1 TABLET ORAL EVERY 6 HOURS PRN
Qty: 5 TABLET | Refills: 0 | Status: SHIPPED | OUTPATIENT
Start: 2025-01-06

## 2025-01-06 RX ORDER — INSULIN LISPRO-AABC 100 [IU]/ML
80 INJECTION, SOLUTION INTRAVENOUS; SUBCUTANEOUS CONTINUOUS
Qty: 80 ML | Refills: 6 | Status: SHIPPED | OUTPATIENT
Start: 2025-01-06

## 2025-01-06 RX ORDER — METRONIDAZOLE 500 MG/1
500 TABLET ORAL EVERY 8 HOURS
Qty: 6 TABLET | Refills: 0 | Status: SHIPPED | OUTPATIENT
Start: 2025-01-06 | End: 2025-01-06

## 2025-01-06 RX ORDER — METRONIDAZOLE 500 MG/1
500 TABLET ORAL EVERY 8 HOURS
Qty: 6 TABLET | Refills: 0 | Status: SHIPPED | OUTPATIENT
Start: 2025-01-06 | End: 2025-01-08

## 2025-01-06 RX ORDER — METRONIDAZOLE 500 MG/1
500 TABLET ORAL EVERY 8 HOURS
Status: DISCONTINUED | OUTPATIENT
Start: 2025-01-06 | End: 2025-01-06 | Stop reason: HOSPADM

## 2025-01-06 RX ADMIN — METOPROLOL SUCCINATE 50 MG: 50 TABLET, EXTENDED RELEASE ORAL at 09:01

## 2025-01-06 RX ADMIN — METRONIDAZOLE 500 MG: 500 TABLET ORAL at 03:01

## 2025-01-06 RX ADMIN — TORSEMIDE 20 MG: 20 TABLET ORAL at 09:01

## 2025-01-06 RX ADMIN — CLOPIDOGREL BISULFATE 75 MG: 75 TABLET ORAL at 09:01

## 2025-01-06 RX ADMIN — CEFEPIME 1 G: 1 INJECTION, POWDER, FOR SOLUTION INTRAMUSCULAR; INTRAVENOUS at 03:01

## 2025-01-06 RX ADMIN — CALCITRIOL CAPSULES 0.25 MCG 0.25 MCG: 0.25 CAPSULE ORAL at 09:01

## 2025-01-06 RX ADMIN — ASPIRIN 81 MG: 81 TABLET, COATED ORAL at 09:01

## 2025-01-06 RX ADMIN — ISOSORBIDE MONONITRATE 30 MG: 30 TABLET, EXTENDED RELEASE ORAL at 09:01

## 2025-01-06 RX ADMIN — PIPERACILLIN SODIUM AND TAZOBACTAM SODIUM 4.5 G: 4; .5 INJECTION, POWDER, LYOPHILIZED, FOR SOLUTION INTRAVENOUS at 09:01

## 2025-01-06 RX ADMIN — PIPERACILLIN SODIUM AND TAZOBACTAM SODIUM 4.5 G: 4; .5 INJECTION, POWDER, LYOPHILIZED, FOR SOLUTION INTRAVENOUS at 12:01

## 2025-01-06 NOTE — HOSPITAL COURSE
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 He was monitored in house on IV antibiotics. Cultures now growing group G strep and enterobacter cloacae. ID consulted and recommend 5d treatment course with cefepime and flagyl, ending 1/8/25. Stable for discharge at this time with ; will need close Podiatry follow up for ongoing wound care. Follow up with Pathology; if + margins, may need to extend course.    /67   Pulse 65   Temp 98.5 °F (36.9 °C) (Oral)   Resp 20   Ht 5' 7" (1.702 m)   Wt 108.4 kg (238 lb 15.7 oz)   SpO2 98%   BMI 37.43 kg/m²   Physical Exam  Constitutional:       Appearance: Normal appearance.   HENT:      Head: Normocephalic.      Mouth/Throat:      Mouth: Mucous membranes are moist.      Pharynx: Oropharynx is clear.   Eyes:      Pupils: Pupils are equal, round, and reactive to light.   Cardiovascular:      Rate and Rhythm: Normal rate and regular rhythm.      Pulses: Normal pulses.      Heart sounds: Normal heart sounds.   Pulmonary:      Effort: Pulmonary effort is normal.      Breath sounds: Normal breath sounds.   Abdominal:      General: Bowel sounds are normal.      Palpations: Abdomen is soft.   Musculoskeletal:         General: Normal range of motion.      Cervical back: Normal range of motion.   Skin:     General: Skin is warm and dry.      Capillary Refill: Capillary refill takes less than 2 seconds.      Comments: Left foot bandaged postoperatively   Neurological:      General: No focal deficit present.      Mental Status: He is alert and oriented to person, place, and time. Mental status is at baseline.   Psychiatric:         Mood and Affect: Mood normal.         Behavior: Behavior normal.      "

## 2025-01-06 NOTE — DISCHARGE SUMMARY
"[facility] Medicine  Discharge Summary      Patient Name: Jian Arrieta  MRN: [id number]  SRIKANTH: [id number]  Patient Class: IP- Inpatient  Admission Date: 1/3/2025  Hospital Length of Stay: 3 days  Discharge Date and Time:  01/06/2025 1:00 PM  Attending Physician: Mohinder Jimenes.,*   Discharging Provider: Mohinder Jimenes MD  Primary Care Provider: Reza Boyer MD    Primary Care Team: Networked reference to record PCT     HPI:   Patient is a 69-year-old male with a history of CKD 4, IDDM, PAF presented as direct admit from Podiatry.  Patient at home for left 2nd metatarsal amputation for gangrenous toe.  Patient was evaluated at bedside postoperatively denies any pain.  Patient has insulin pump which apparently came dislodged last night which is his reading for uncontrolled glucose this morning.  Patient will be admitted to Hospital Medicine we will start antibiotics consult podiatry and ID.      Procedure(s) (LRB):  AMPUTATION, TOE (Left)      Hospital Course:   Mr. Arrieta presented with gangrene to left 2nd metatarsal. S/p amputation on 1/3. He was monitored in house on IV antibiotics. Cultures now growing group G strep and enterobacter cloacae. ID consulted and recommend 5d treatment course with cefepime and flagyl, ending 1/8/25. Stable for discharge at this time with HH; will need close Podiatry follow up for ongoing wound care. Follow up with Pathology; if + margins, may need to extend course.    /67   Pulse 65   Temp 98.5 °F (36.9 °C) (Oral)   Resp 20   Ht 5' 7" (1.702 m)   Wt 108.4 kg (238 lb 15.7 oz)   SpO2 98%   BMI 37.43 kg/m²   Physical Exam  Constitutional:       Appearance: Normal appearance.   HENT:      Head: Normocephalic.      Mouth/Throat:      Mouth: Mucous membranes are moist.      Pharynx: Oropharynx is clear.   Eyes:      Pupils: Pupils are equal, round, and reactive to light.   Cardiovascular:      Rate and Rhythm: Normal rate and regular " rhythm.      Pulses: Normal pulses.      Heart sounds: Normal heart sounds.   Pulmonary:      Effort: Pulmonary effort is normal.      Breath sounds: Normal breath sounds.   Abdominal:      General: Bowel sounds are normal.      Palpations: Abdomen is soft.   Musculoskeletal:         General: Normal range of motion.      Cervical back: Normal range of motion.   Skin:     General: Skin is warm and dry.      Capillary Refill: Capillary refill takes less than 2 seconds.      Comments: Left foot bandaged postoperatively   Neurological:      General: No focal deficit present.      Mental Status: He is alert and oriented to person, place, and time. Mental status is at baseline.   Psychiatric:         Mood and Affect: Mood normal.         Behavior: Behavior normal.         Goals of Care Treatment Preferences:  Code Status: Full Code      SDOH Screening:  The patient was screened for utility difficulties, food insecurity, transport difficulties, housing insecurity, and interpersonal safety and there were no concerns identified this admission.     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  Seen at Podiatry this morning and sent to hospital for OR    Now status post amputation, concern for mild area at base of medial great metatarsal.  Consult ID and Podiatry  Continue vancomycin and Zosyn renal dose for now  Pain  "control      CKD (chronic kidney disease) stage 4, GFR 15-29 ml/min  Creatine stable for now. BMP reviewed- noted Estimated Creatinine Clearance: 21.2 mL/min (A) (based on SCr of 3.8 mg/dL (H)). according to latest data. Based on current GFR, CKD stage is stage 4 - GFR 15-29.  Monitor UOP and serial BMP and adjust therapy as needed. Renally dose meds. Avoid nephrotoxic medications and procedures.    Type 2 diabetes mellitus with left diabetic foot infection  Patient's FSGs are uncontrolled due to hyperglycemia on current medication regimen.  Last A1c reviewed-   Lab Results   Component Value Date    HGBA1C 9.7 (H) 01/04/2025     Most recent fingerstick glucose reviewed-   Recent Labs   Lab 01/04/25  2033 01/04/25  2212 01/05/25  0226 01/05/25  0559   POCTGLUCOSE 143* 285* 220* 159*       Current correctional scale   home dosing insulin pump.  Currently at 1.6 units per hour with bolus dosing per pump.  Maintain anti-hyperglycemic dose as follows- continue home dose insulin pump.  If uncontrolled would DC his insulin pump and start basal and bolus dosing  Antihyperglycemics (From admission, onward)      Start     Stop Route Frequency Ordered    01/03/25 1930  insulin regular insulin pump from home        Question Answer Comment   Target number 150    Basal Rate #1 5    Basal rate #1 time 1730        -- SubQ Continuous 01/03/25 1829          Hold Oral hypoglycemics while patient is in the hospital.    Chronic diastolic congestive heart failure  Patient has Diastolic (HFpEF) heart failure that is Chronic. On presentation their CHF was well compensated. Most recent BNP and echo results are listed below.  No results for input(s): "BNP" in the last 72 hours.  Latest ECHO  Results for orders placed during the hospital encounter of 10/12/23    Echo    Interpretation Summary    Left Ventricle: The left ventricle is normal in size. Normal wall thickness. Septal motion is consistent with post-operative status. There is low " normal systolic function with a visually estimated ejection fraction of 50 - 55%. There is normal diastolic function.    Right Ventricle: Normal right ventricular cavity size. Wall thickness is normal. Right ventricle wall motion  is normal. Systolic function is normal.    Pulmonary Artery: The estimated pulmonary artery systolic pressure is 21 mmHg.    IVC/SVC: Normal venous pressure at 3 mmHg.    Current Heart Failure Medications  torsemide tablet 20 mg, Daily, Oral  , 2 times daily, Oral  metoprolol succinate (TOPROL-XL) 24 hr tablet 50 mg, 2 times daily, Oral    Plan  - Monitor strict I&Os and daily weights.    - Place on telemetry  - Low sodium diet  - Place on fluid restriction of 2 L.   - Cardiology has not been consulted  - The patient's volume status is at their baseline  - continue home meds; patient has improved ejection fraction        Class 3 obesity  -diet and exercise   -could consider GLP 1 receptor agonist in future      Paroxysmal atrial fibrillation  Patient has paroxysmal (<7 days) atrial fibrillation. Patient is currently in sinus rhythm. FMQPW2NBPy Score: 3. The patients heart rate in the last 24 hours is as follows:  Pulse  Min: 61  Max: 82     Antiarrhythmics  , 2 times daily, Oral  metoprolol succinate (TOPROL-XL) 24 hr tablet 50 mg, 2 times daily, Oral    Anticoagulants       Plan  - Replete lytes with a goal of K>4, Mg >2  - Patient is not anticoagulated due to patient refusal  - Patient's afib is currently controlled        Type 2 diabetes mellitus with diabetic neuropathy, with long-term current use of insulin  -on insulin pump  -continue statin      Sleep apnea  - refusing CPAP qhs      Coronary artery disease due to calcified coronary lesion  Patient with known CAD s/p stent placement and CABG, which is controlled Will continue ASA, Plavix, and Statin and monitor for S/Sx of angina/ACS. Continue to monitor on telemetry.       Final Active Diagnoses:    Diagnosis Date Noted POA     PRINCIPAL PROBLEM:  Gangrene [I96] 01/04/2025 Yes    CKD (chronic kidney disease) stage 4, GFR 15-29 ml/min [N18.4] 01/04/2025 Yes    Type 2 diabetes mellitus with left diabetic foot infection [E11.628, L08.9] 01/03/2025 Yes    Chronic diastolic congestive heart failure [I50.32] 02/23/2024 Yes    Class 3 obesity [E66.813] 03/15/2021 Yes    Paroxysmal atrial fibrillation [I48.0] 03/16/2019 Yes    Type 2 diabetes mellitus with diabetic neuropathy, with long-term current use of insulin [E11.40, Z79.4] 02/04/2019 Not Applicable     Chronic    Sleep apnea [G47.30] 08/26/2015 Yes    Coronary artery disease due to calcified coronary lesion [I25.10, I25.84] 05/08/2015 Yes     Chronic      Problems Resolved During this Admission:       Discharged Condition: good    Disposition: Home or Self Care    Follow Up:    Patient Instructions:      Ambulatory referral/consult to Podiatry   Standing Status: Future   Referral Priority: Routine Referral Type: Consultation   Referral Reason: Specialty Services Required   Requested Specialty: Podiatry   Number of Visits Requested: 1     Diet Adult Regular     Notify your health care provider if you experience any of the following:  temperature >100.4     Notify your health care provider if you experience any of the following:  persistent nausea and vomiting or diarrhea     Notify your health care provider if you experience any of the following:  severe uncontrolled pain     Notify your health care provider if you experience any of the following:  difficulty breathing or increased cough     Notify your health care provider if you experience any of the following:  severe persistent headache     Notify your health care provider if you experience any of the following:  persistent dizziness, light-headedness, or visual disturbances     Notify your health care provider if you experience any of the following:  increased confusion or weakness     Activity as tolerated       Significant Diagnostic  Studies: N/A    Pending Diagnostic Studies:       Procedure Component Value Units Date/Time    Specimen to Pathology, Surgery Orthopedics [[id number]] Collected: 01/03/25 1536    Order Status: Sent Lab Status: In process Updated: 01/06/25 0700    Specimen: Tissue            Medications:  Reconciled Home Medications:      Medication List        START taking these medications      ceFEPIme 1 gram injection  Inject 1 g into the vein once daily. End 1/8/25     metroNIDAZOLE 500 MG tablet  Commonly known as: FLAGYL  Take 1 tablet (500 mg total) by mouth every 8 (eight) hours. End 1/8/25 for 2 days     oxyCODONE-acetaminophen 5-325 mg per tablet  Commonly known as: PERCOCET  Take 1 tablet by mouth every 6 (six) hours as needed for Pain.            CONTINUE taking these medications      acetaminophen 325 MG tablet  Commonly known as: TYLENOL  Take 2 tablets (650 mg total) by mouth every 8 (eight) hours as needed for Pain.     aspirin 81 MG EC tablet  Commonly known as: ECOTRIN  Take 1 tablet (81 mg total) by mouth once daily.     BAQSIMI 3 mg/actuation Spry  Generic drug: glucagon  1 each by Nasal route daily as needed (if glucose is less than 70 - can repeat in 1 hour if still low/less than 70).     blood pressure monitor Kit  1 kit by Misc.(Non-Drug; Combo Route) route 2 (two) times a day.     calcitRIOL 0.25 MCG Cap  Commonly known as: ROCALTROL  Take 0.25 mcg by mouth once daily.     clopidogreL 75 mg tablet  Commonly known as: PLAVIX  Take 1 tablet (75 mg total) by mouth once daily.     DEXCOM G6 SENSOR Sandi  Generic drug: blood-glucose sensor  Inject 1 each into the skin every 10 days.     DEXCOM G6 TRANSMITTER Sandi  Generic drug: blood-glucose transmitter  Inject 1 each into the skin every 10 days.     HumaLOG KwikPen Insulin 100 unit/mL pen  Generic drug: insulin lispro  Inject 5 Units into the skin before meals as needed (before each meal - if OFF of insulin pump). This is emergency back up insulin to use with  meals if OFF of insulin pump     insulin detemir U-100 (Levemir) 100 unit/mL (3 mL) Inpn pen  Inject 30 Units into the skin every evening. This is emergency back up insulin only if OFF of your insulin pump     isosorbide mononitrate 30 MG 24 hr tablet  Commonly known as: IMDUR  Take 1 tablet (30 mg total) by mouth once daily.     LYUMJEV U-100 INSULIN 100 unit/mL  Generic drug: insulin lispro-aabc  Inject 80 Units into the skin continuous. Uses up to 80 units daily with omnipod 5 insulin pump as directed.     metoprolol succinate 50 MG 24 hr tablet  Commonly known as: TOPROL-XL  Take 50 mg by mouth 2 (two) times daily.     OMNIPOD 5 G6 PODS (GEN 5) Crtg  Generic drug: insulin pump cart,automated,BT  INJECT 1 EACH INTO THE SKIN EVERY OTHER DAY.     rosuvastatin 20 MG tablet  Commonly known as: CRESTOR  Take 20 mg by mouth once daily.     torsemide 20 MG Tab  Commonly known as: DEMADEX  Take 1 tablet (20 mg total) by mouth once daily.            STOP taking these medications      amLODIPine 10 MG tablet  Commonly known as: NORVASC              Indwelling Lines/Drains at time of discharge:   Lines/Drains/Airways       None                   Time spent on the discharge of patient: 35 minutes         Mohinder Jimenes MD  Department of Hospital Medicine  [facility]

## 2025-01-06 NOTE — PLAN OF CARE
[facility] sent HH orders to [facility] and [facility]. Pt will be contacted by  agency to schedule first appointment time/date. Pt was provided IV abx education by Lisa pittman/Mitchell. Per Lisa IV abx medication will be delivered to pts home this evening. Lisa expressed pt is clear to dc from her end. SW communicated with pt and he did not have any questions for sw. Pt confirmed receiving IV abx education. Pt reports that family will provide transport home. Pt aware that IV abx education will be delivered to home this evening.     SW will continue to follow pt throughout his transitions of care and assist with any dc needs.     Infusion Company: [facility]   agency: [facility]     requested PCP follow up    SCHED PCP w/Dr Reza Boyer on 1/14 at 9:45    Cleared from CM . Bedside Nurse and VN notified.    Future Appointments   Date Time Provider Department Center   1/7/2025  1:00 PM Savanah Felton DPM [facility] W[facility]        01/06/25 1315   Final Note   Assessment Type Final Discharge Note   Anticipated Discharge Disposition [facility]   Hospital Resources/Appts/Education Provided Appointments scheduled and added to AVS   Post-Acute Status   Discharge Delays None known at this time

## 2025-01-06 NOTE — PROGRESS NOTES
Pharmacist Renal Dose Adjustment Note    Jian Arrieta is a 69 y.o. male being treated with the medication piperacillin/tazobactam    Patient Data:    Vital Signs (Most Recent):  Temp: 97.9 °F (36.6 °C) (01/05/25 1946)  Pulse: 73 (01/05/25 1946)  Resp: 18 (01/05/25 1946)  BP: 102/67 (01/05/25 1946)  SpO2: 99 % (01/05/25 1946) Vital Signs (72h Range):  Temp:  [97.5 °F (36.4 °C)-98.9 °F (37.2 °C)]   Pulse:  [61-82]   Resp:  [9-22]   BP: ()/(53-73)   SpO2:  [96 %-100 %]      Recent Labs   Lab 01/03/25  1133 01/04/25  0637 01/05/25  0726   CREATININE 3.7* 3.7* 3.8*     Serum creatinine: 3.8 mg/dL (H) 01/05/25 0726  Estimated creatinine clearance: 21.2 mL/min (A)    Medication: piperacillin/tazobactam dose: 4.5 g frequency: q12h will be changed to medication: piperacillin/tazobactam dose: 4.5 g frequency: q8h    Pharmacist's Name: Darwin Hunt, PharmD, BCCCP  Pharmacist's Extension: [phone]

## 2025-01-06 NOTE — PROGRESS NOTES
Pharmacokinetic Assessment Follow Up: IV Vancomycin    Vancomycin serum concentration assessment(s):    The random level was drawn correctly and can be used to guide therapy at this time. The measurement is above the desired definitive target range of 15 to 20 mcg/mL.    Vancomycin Regimen Plan:    Re-dose when the random level is less than 20 mcg/mL, next level to be drawn at 2000 on 1/6    Drug levels (last 3 results):  Recent Labs   Lab Result Units 01/04/25  1810 01/05/25  2055   Vancomycin, Random ug/mL 17.5 24.1       Pharmacy will continue to follow and monitor vancomycin.    Please contact pharmacy at extension [phone] for questions regarding this assessment.    Thank you for the consult,   Darwin Hunt, PharmD, BCCCP       Patient brief summary:  Jian Arrieta is a 69 y.o. male initiated on antimicrobial therapy with IV Vancomycin for treatment of bone/joint infection    The patient's current regimen is pulse dosing based on random levels    Drug Allergies:   Review of patient's allergies indicates:  No Known Allergies    Actual Body Weight:   108.4 kg    Renal Function:   Estimated Creatinine Clearance: 21.2 mL/min (A) (based on SCr of 3.8 mg/dL (H)).    Dialysis Method (if applicable):  N/A    CBC (last 72 hours):  Recent Labs   Lab Result Units 01/04/25  0637 01/05/25  0726   WBC K/uL 6.03 6.43   Hemoglobin g/dL 8.8* 9.2*   Hemoglobin A1C % 9.7*  --    Hematocrit % 27.3* 28.9*   Platelets K/uL 179 179   Gran % % 61.6 55.8   Lymph % % 26.9 31.6   Mono % % 7.3 7.5   Eosinophil % % 3.3 4.4   Basophil % % 0.7 0.5   Differential Method  Automated Automated       Metabolic Panel (last 72 hours):  Recent Labs   Lab Result Units 01/03/25  1133 01/04/25  0637 01/05/25  0726   Sodium mmol/L 133* 136 139   Potassium mmol/L 4.2 4.1 4.9   Chloride mmol/L 96 100 102   CO2 mmol/L 28 28 28   Glucose mg/dL 432* 180* 115*   BUN mg/dL 47* 42* 43*   Creatinine mg/dL 3.7* 3.7* 3.8*   Albumin g/dL  --  2.1* 2.1*   Total  Bilirubin mg/dL  --  0.3 0.3   Alkaline Phosphatase U/L  --  153* 138   AST U/L  --  69* 59*   ALT U/L  --  62* 52*       Vancomycin Administrations:  vancomycin given in the last 96 hours                     vancomycin 1,250 mg in 0.9% NaCl 250 mL IVPB (admixture device) (mg) 1,250 mg New Bag 01/04/25 2054    vancomycin 2 g in 0.9% sodium chloride 500 mL IVPB (mg) 2,000 mg New Bag 01/03/25 1932                    Microbiologic Results:  Microbiology Results (last 7 days)       Procedure Component Value Units Date/Time    Culture, Anaerobe [[id number]] Collected: 01/03/25 1536    Order Status: Completed Specimen: Incision site from Toe, Left Foot Updated: 01/05/25 1217     Anaerobic Culture Culture in progress    Narrative:      Bone left second toe    Aerobic culture [[id number]]  (Abnormal) Collected: 01/03/25 1536    Order Status: Completed Specimen: Incision site from Toe, Left Foot Updated: 01/05/25 1022     Aerobic Bacterial Culture GRAM NEGATIVE LEONOR  Many  Identification and susceptibility pending        STREPTOCOCCUS GROUP G  Many  Beta-hemolytic streptococci are routinely susceptible to   penicillins,cephalosporins and carbapenems.      Narrative:      Bone left second toe    AFB Culture & Smear [[id number]] Collected: 01/03/25 1536    Order Status: Completed Specimen: Incision site from Toe, Left Foot Updated: 01/04/25 2127     AFB Culture & Smear Culture in progress    Narrative:      Bone left second toe    Gram stain [[id number]] Collected: 01/03/25 1536    Order Status: Completed Specimen: Incision site from Toe, Left Foot Updated: 01/04/25 0511     Gram Stain Result Rare WBC's      Moderate Gram positive cocci    Narrative:      Bone left second toe    Fungus culture [[id number]] Collected: 01/03/25 1536    Order Status: Sent Specimen: Incision site from Toe, Left Foot Updated: 01/03/25 2012

## 2025-01-06 NOTE — PLAN OF CARE
[place] [facility]      HOME HEALTH ORDERS  FACE TO FACE ENCOUNTER    Patient Name: Jian Arrieta  YOB: 1955    PCP: Reza Boyer MD   PCP Address: [address] [place] [address] SUITE [facility] / [address]ONNA [id number]  PCP Phone Number: [phone]  PCP Fax: [phone]    Encounter Date: 12/19/24    Admit to Home Health    Diagnoses:  Active Hospital Problems    Diagnosis  POA    *Gangrene [I96]  Yes    CKD (chronic kidney disease) stage 4, GFR 15-29 ml/min [N18.4]  Yes    Type 2 diabetes mellitus with left diabetic foot infection [E11.628, L08.9]  Yes    Chronic diastolic congestive heart failure [I50.32]  Yes    Class 3 obesity [E66.813]  Yes    Paroxysmal atrial fibrillation [I48.0]  Yes    Type 2 diabetes mellitus with diabetic neuropathy, with long-term current use of insulin [E11.40, Z79.4]  Not Applicable     Chronic    Sleep apnea [G47.30]  Yes    Coronary artery disease due to calcified coronary lesion [I25.10, I25.84]  Yes     Chronic     5 stents on ASA          Resolved Hospital Problems   No resolved problems to display.       Follow Up Appointments:  Future Appointments   Date Time Provider Department Center   1/7/2025  1:00 PM Savanah Felton DPM [facility] WOUND [facility]       Allergies:Review of patient's allergies indicates:  No Known Allergies    Medications: Review discharge medications with patient and family and provide education.    Current Facility-Administered Medications   Medication Dose Route Frequency Provider Last Rate Last Admin    aspirin EC tablet 81 mg  81 mg Oral Daily Chapo Neal NP   81 mg at 01/06/25 0937    atorvastatin tablet 80 mg  80 mg Oral QHS Chapo Neal, NP   80 mg at 01/05/25 2110    calcitRIOL capsule 0.25 mcg  0.25 mcg Oral Daily Mohinder Jimenes MD   0.25 mcg at 01/06/25 0937    ceFEPIme injection 1 g  1 g Intravenous Q12H Mohinder Jimenes MD        clopidogreL tablet 75 mg  75 mg Oral Daily Chapo Neal, NP   75 mg  at 01/06/25 0936    dextrose 50% injection 12.5 g  12.5 g Intravenous PRN Chapo Neal NP        dextrose 50% injection 25 g  25 g Intravenous PRN Chapo Neal, NP        glucagon (human recombinant) injection 1 mg  1 mg Intramuscular PRN Chapo Neal, NP        glucose chewable tablet 16 g  16 g Oral PRN Chapo Neal NP        glucose chewable tablet 24 g  24 g Oral PRN Chapo Neal, NP        insulin regular insulin pump from home  1.6 Units/hr Subcutaneous Continuous Chapo Neal NP 0.02 mL/hr at 01/03/25 1930 1.6 Units/hr at 01/03/25 1930    isosorbide mononitrate 24 hr tablet 30 mg  30 mg Oral Daily Chapo Neal, NP   30 mg at 01/06/25 0937    metoprolol succinate (TOPROL-XL) 24 hr tablet 50 mg  50 mg Oral BID Mohinder Jimenes MD   50 mg at 01/06/25 0936    metroNIDAZOLE tablet 500 mg  500 mg Oral Q8H Mohinder Jimenes MD        mirtazapine tablet 7.5 mg  7.5 mg Oral QHS Chapo Neal NP   7.5 mg at 01/05/25 2110    naloxone 0.4 mg/mL injection 0.02 mg  0.02 mg Intravenous PRN Chapo Neal NP        ondansetron injection 4 mg  4 mg Intravenous Q8H PRN Chapo Neal NP        oxyCODONE-acetaminophen 5-325 mg per tablet 1 tablet  1 tablet Oral Q6H PRN Chapo Neal NP   1 tablet at 01/04/25 2131    sodium chloride 0.9% flush 10 mL  10 mL Intravenous Q12H PRN Chapo Neal, NP        sodium chloride 0.9% flush 10 mL  10 mL Intravenous Q12H PRN Mohinder Jimenes MD        torsemide tablet 20 mg  20 mg Oral Daily Chapo Neal NP   20 mg at 01/06/25 0937        Medication List        START taking these medications      ceFEPIme 1 gram injection  Inject 1 g into the vein every 12 (twelve) hours. End 1/8/25     metroNIDAZOLE 500 MG tablet  Commonly known as: FLAGYL  Take 1 tablet (500 mg total) by mouth every 8 (eight) hours. End 1/8/25 for 2 days     oxyCODONE-acetaminophen  5-325 mg per tablet  Commonly known as: PERCOCET  Take 1 tablet by mouth every 6 (six) hours as needed for Pain.            CONTINUE taking these medications      acetaminophen 325 MG tablet  Commonly known as: TYLENOL  Take 2 tablets (650 mg total) by mouth every 8 (eight) hours as needed for Pain.     aspirin 81 MG EC tablet  Commonly known as: ECOTRIN  Take 1 tablet (81 mg total) by mouth once daily.     BAQSIMI 3 mg/actuation Spry  Generic drug: glucagon  1 each by Nasal route daily as needed (if glucose is less than 70 - can repeat in 1 hour if still low/less than 70).     blood pressure monitor Kit  1 kit by Misc.(Non-Drug; Combo Route) route 2 (two) times a day.     calcitRIOL 0.25 MCG Cap  Commonly known as: ROCALTROL  Take 0.25 mcg by mouth once daily.     clopidogreL 75 mg tablet  Commonly known as: PLAVIX  Take 1 tablet (75 mg total) by mouth once daily.     DEXCOM G6 SENSOR Sandi  Generic drug: blood-glucose sensor  Inject 1 each into the skin every 10 days.     DEXCOM G6 TRANSMITTER Sandi  Generic drug: blood-glucose transmitter  Inject 1 each into the skin every 10 days.     HumaLOG KwikPen Insulin 100 unit/mL pen  Generic drug: insulin lispro  Inject 5 Units into the skin before meals as needed (before each meal - if OFF of insulin pump). This is emergency back up insulin to use with meals if OFF of insulin pump     insulin detemir U-100 (Levemir) 100 unit/mL (3 mL) Inpn pen  Inject 30 Units into the skin every evening. This is emergency back up insulin only if OFF of your insulin pump     isosorbide mononitrate 30 MG 24 hr tablet  Commonly known as: IMDUR  Take 1 tablet (30 mg total) by mouth once daily.     LYUMJEV U-100 INSULIN 100 unit/mL  Generic drug: insulin lispro-aabc  Inject 80 Units into the skin continuous. Uses up to 80 units daily with omnipod 5 insulin pump as directed.     metoprolol succinate 50 MG 24 hr tablet  Commonly known as: TOPROL-XL  Take 50 mg by mouth 2 (two) times daily.      OMNIPOD 5 G6 PODS (GEN 5) Crtg  Generic drug: insulin pump cart,automated,BT  INJECT 1 EACH INTO THE SKIN EVERY OTHER DAY.     rosuvastatin 20 MG tablet  Commonly known as: CRESTOR  Take 20 mg by mouth once daily.     torsemide 20 MG Tab  Commonly known as: DEMADEX  Take 1 tablet (20 mg total) by mouth once daily.            STOP taking these medications      amLODIPine 10 MG tablet  Commonly known as: NORVASC                I have seen and examined this patient within the last 30 days. My clinical findings that support the need for the home health skilled services and home bound status are the following:no   Medical restrictions requiring assistance of another human to leave home due to  IV infusion Needs.     Diet:   diabetic diet 2000 calorie    Labs:  CBC, BMP once in 1 week; fax to PCP    Referrals/ Consults  Physical Therapy to evaluate and treat. Evaluate for home safety and equipment needs; Establish/upgrade home exercise program. Perform / instruct on therapeutic exercises, gait training, transfer training, and Range of Motion.  Occupational Therapy to evaluate and treat. Evaluate home environment for safety and equipment needs. Perform/Instruct on transfers, ADL training, ROM, and therapeutic exercises.  Aide to provide assistance with personal care, ADLs, and vital signs.    Activities:   activity as tolerated    Nursing:   Agency to admit patient within 24 hours of hospital discharge unless specified on physician order or at patient request    SN to complete comprehensive assessment including routine vital signs. Instruct on disease process and s/s of complications to report to MD. Review/verify medication list sent home with the patient at time of discharge  and instruct patient/caregiver as needed. Frequency may be adjusted depending on start of care date.     Skilled nurse to perform up to 3 visits PRN for symptoms related to diagnosis    Notify MD if SBP > 160 or < 90; DBP > 90 or < 50; HR > 120 or  < 50; Temp > 101; O2 < 88%; Other:       Ok to schedule additional visits based on staff availability and patient request on consecutive days within the home health episode.    When multiple disciplines ordered:    Start of Care occurs on Sunday - Wednesday schedule remaining discipline evaluations as ordered on separate consecutive days following the start of care.    Thursday SOC -schedule subsequent evaluations Friday and Monday the following week.     Friday - Saturday SOC - schedule subsequent discipline evaluations on consecutive days starting Monday of the following week.    For all post-discharge communication and subsequent orders please contact patient's primary care physician. If unable to reach primary care physician or do not receive response within 30 minutes, please contact Chanda Hanley for clinical staff order clarification    Miscellaneous   Home Infusion Therapy:   SN to perform Infusion Therapy/Central Line Care.  Review Central Line Care & Central Line Flush with patient.    Administer (drug and dose): cefepime 1g q24    Last dose given: 1330                         Home dose due: 1330 on 1/7    Scrub the Hub: Prior to accessing the line, always perform a 30 second alcohol scrub  Each lumen of the central line is to be flushed at least daily with 10 mL Normal Saline and 3 mL Heparin flush (10 units/mL)  Skilled Nurse (SN) may draw blood from IV access  Blood Draw Procedure:   - Aspirate at least 5 mL of blood   - Discard   - Obtain specimen   - Change injection cap   - Flush with 20 mL Normal Saline followed by a                 3-5 mL Heparin flush (10 units/mL)  Central :   - Sterile dressing changes are done weekly and as needed.   - Use chlor-hexadine scrub to cleanse site, apply Biopatch to insertion site,       apply securement device dressing   - Injection caps are changed weekly and after EVERY lab draw.   - If sterile gauze is under dressing to control oozing,                  dressing change must be performed every 24 hours until gauze is not needed.  Diabetic Care:   SN to perform and educate Diabetic management with blood glucose monitoring:, Fingerstick blood sugar AC and HS, and Report CBG < 60 or > 350 to physician.    Home Health Aide:  Nursing Twice weekly, Physical Therapy Three times weekly, and Occupational Therapy Twice weekly    Wound Care Orders  yes:     HH to change dressings 3x weekly as follows: Cleanse left foot with saline, then apply packing strip to proximal incision site, then gauze, kerlix, and ace wrap.  - WBAT to LLE in post op shoe    I certify that this patient is confined to his home and needs intermittent skilled nursing care, physical therapy, and occupational therapy.

## 2025-01-06 NOTE — NURSING
PT REFUSED 0200 ACCU CHECK AND 0400 VITAL SIGNS.  ON CALL PROVIDER (RAMONE) NOTIFIED BY THIS NURSE (SANTOS).  NO NEW ORDERS GIVEN.

## 2025-01-06 NOTE — PROGRESS NOTES
Discharge orders noted. Additional clinical references attached. Patient's discharge instructions given by bedside RN. Virtual nurse cued into room and reviewed discharge instructions. Education provided on new medication, diagnosis, and follow-up appointments. Teach back method used. Patient and his spouse verbalized understanding. All questions answered. Bedside nurse updated on patient status and to request transportation to [facility] when ready.     01/06/25 1740   AVS Confirmation   Discharge instructions and AVS provided to and reviewed with patient and/or significant other. Yes

## 2025-01-06 NOTE — PLAN OF CARE
SW met with pt at bedside to discuss dc planning. All information confirmed on chart. Pt resides in home with his wife. Pt is independent in ADLs. Pt reports at time of dc he will have family member near by for bathing due to fear of falling. Pt has listed dme below. Pt uses O2 as needed. At time of dc pts brother or wife will provide transport home. SW sent HH referral via epic. SW sent infusion referral via [employer]. Pt agreeable to administer IV abx at home. Pt reports that his wife will assist if needed. SW will continue to follow pt throughout his transitions of care and assist with any dc needs.     SW requested PCP follow up    Future Appointments   Date Time Provider Department Center   1/7/2025  1:00 PM Savanah Felton DPM [facility] WOUND [facility]        01/06/25 1240   Discharge Assessment   Assessment Type Discharge Planning Assessment   Confirmed/corrected address, phone number and insurance Yes   Confirmed Demographics Correct on Facesheet   Source of Information patient   Communicated CAMILLE with patient/caregiver Yes   Reason For Admission Gangrene   People in Home spouse   Do you expect to return to your current living situation? Yes   Do you have help at home or someone to help you manage your care at home? Yes   Who are your caregiver(s) and their phone number(s)? Geri Arrieta (Spouse)  [phone]   Prior to hospitilization cognitive status: Alert/Oriented   Current cognitive status: Alert/Oriented   Walking or Climbing Stairs Difficulty no   Dressing/Bathing Difficulty no   Home Layout Bedroom on 2nd floor   Equipment Currently Used at Home wheelchair;cane, straight;walker, rolling;bedside commode;bath bench;oxygen;shower chair   Readmission within 30 days? No   Patient currently being followed by outpatient case management? No   Do you currently have service(s) that help you manage your care at home? No   Do you take prescription medications? Yes   Do you have prescription coverage? Yes    Coverage [facility] MANAGED [employer] [facility] MEDICARE ADVANTAGE   Do you have any problems affording any of your prescribed medications? No   Is the patient taking medications as prescribed? yes   Who is going to help you get home at discharge? Geri Arrieta (Spouse)  [phone]   How do you get to doctors appointments? family or friend will provide   Are you on dialysis? No   Do you take coumadin? No   Discharge Plan A Home with family;Home Health   DME Needed Upon Discharge  none   Discharge Plan discussed with: Patient   Transition of Care Barriers None   Housing Stability   In the last 12 months, was there a time when you were not able to pay the mortgage or rent on time? N   At any time in the past 12 months, were you homeless or living in a shelter (including now)? N   Transportation Needs   Has the lack of transportation kept you from medical appointments, meetings, work or from getting things needed for daily living? No   Food Insecurity   Within the past 12 months, you worried that your food would run out before you got the money to buy more. Never true   Within the past 12 months, the food you bought just didn't last and you didn't have money to get more. Never true   Stress   Do you feel stress - tense, restless, nervous, or anxious, or unable to sleep at night because your mind is troubled all the time - these days? To some exte   Social Isolation   How often do you feel lonely or isolated from those around you?  Never   Alcohol Use   Q1: How often do you have a drink containing alcohol? Monthly or l   Q2: How many drinks containing alcohol do you have on a typical day when you are drinking? 1 or 2   Q3: How often do you have six or more drinks on one occasion? Less than mo   Utilities   In the past 12 months has the electric, gas, oil, or water company threatened to shut off services in your home? No   Health Literacy   How often do you need to have someone help you when you  read instructions, pamphlets, or other written material from your doctor or pharmacy? Always  (Wife assist)   OTHER   Name(s) of People in Home Geri Arrieta (Spouse)  [phone]

## 2025-01-06 NOTE — PLAN OF CARE
Problem: Adult Inpatient Plan of Care  Goal: Plan of Care Review  Outcome: Progressing     Problem: Diabetes Comorbidity  Goal: Blood Glucose Level Within Targeted Range  Outcome: Progressing     Problem: Wound  Goal: Optimal Coping  Outcome: Progressing     Problem: Wound  Goal: Optimal Functional Ability  Outcome: Progressing     Problem: Extremity Amputation  Goal: Optimal Coping with Amputation  Outcome: Progressing     Problem: Fall Injury Risk  Goal: Absence of Fall and Fall-Related Injury  Outcome: Progressing    Blood glucose monitoring via dexcom meter/poct done by staff.  ABX administered without adverse reactions.  Ambulated with nurse/walker from bed to chair Midline placed to right arm.  Safety maintained.  Bed alarm on.  Call light within reach.

## 2025-01-06 NOTE — PROGRESS NOTES
[facility]                   Pharmacy  Pharmacy Vancomycin/AG Sign-off    Therapy with vancomycin completed and/or consult discontinued by provider.  Pharmacy will sign off, please re-consult as needed. Thank you for allowing us to participate in this patient's care.     Art Griffith, PharmD    [phone]

## 2025-01-06 NOTE — PLAN OF CARE
Problem: Adult Inpatient Plan of Care  Goal: Plan of Care Review  Outcome: Progressing  Goal: Patient-Specific Goal (Individualized)  Outcome: Progressing  Goal: Absence of Hospital-Acquired Illness or Injury  Outcome: Progressing  Goal: Optimal Comfort and Wellbeing  Outcome: Progressing  Goal: Readiness for Transition of Care  Outcome: Progressing     Problem: Diabetes Comorbidity  Goal: Blood Glucose Level Within Targeted Range  Outcome: Progressing     Problem: Bariatric Environmental Safety  Goal: Safety Maintained with Care  Outcome: Progressing     Problem: Wound  Goal: Optimal Coping  Outcome: Progressing  Goal: Optimal Functional Ability  Outcome: Progressing  Goal: Absence of Infection Signs and Symptoms  Outcome: Progressing  Goal: Improved Oral Intake  Outcome: Progressing  Goal: Optimal Pain Control and Function  Outcome: Progressing  Goal: Skin Health and Integrity  Outcome: Progressing  Goal: Optimal Wound Healing  Outcome: Progressing     Problem: Extremity Amputation  Goal: Optimal Coping with Amputation  Outcome: Progressing  Goal: Absence of Bleeding  Outcome: Progressing  Goal: Effective Bowel Elimination  Outcome: Progressing  Goal: Fluid and Electrolyte Balance  Outcome: Progressing  Goal: Optimal Functional Ability  Outcome: Progressing  Goal: Absence of Infection Signs and Symptoms  Outcome: Progressing  Goal: Anesthesia/Sedation Recovery  Outcome: Progressing  Goal: Acceptable Pain Control  Outcome: Progressing  Goal: Nausea and Vomiting Relief  Outcome: Progressing  Goal: Effective Urinary Elimination  Outcome: Progressing  Goal: Optimal Residual Limb Healing  Outcome: Progressing     Problem: Fall Injury Risk  Goal: Absence of Fall and Fall-Related Injury  Outcome: Progressing     Problem: Pain Acute  Goal: Optimal Pain Control and Function  Outcome: Progressing     Problem: Skin Injury Risk Increased  Goal: Skin Health and Integrity  Outcome: Progressing     Problem: Infection  Goal:  Absence of Infection Signs and Symptoms  Outcome: Progressing     Problem: Surgery Nonspecified  Goal: Absence of Bleeding  Outcome: Progressing  Goal: Effective Bowel Elimination  Outcome: Progressing  Goal: Fluid and Electrolyte Balance  Outcome: Progressing  Goal: Blood Glucose Level Within Targeted Range  Outcome: Progressing  Goal: Absence of Infection Signs and Symptoms  Outcome: Progressing  Goal: Optimal Pain Control and Function  Outcome: Progressing  Goal: Effective Oxygenation and Ventilation  Outcome: Progressing     Problem: Comorbidity Management  Goal: Maintenance of Heart Failure Symptom Control  Outcome: Progressing  Goal: Blood Pressure in Desired Range  Outcome: Progressing

## 2025-01-06 NOTE — PHYSICIAN QUERY
Please provide further clarification on the procedure performed on the site:  Other procedure (please specify): toe amputation was performed.

## 2025-01-06 NOTE — PHYSICIAN QUERY
Due to conflicting documentation and/or clinical picture, please clarify the type of heart failure:   Chronic systolic heart failure (HFrEF or HFmrEF)

## 2025-01-06 NOTE — SUBJECTIVE & OBJECTIVE
Interval History: No acute events    Review of Systems   Constitutional:  Negative for chills and fever.   Respiratory:  Negative for shortness of breath.    Cardiovascular:  Negative for chest pain.   Gastrointestinal:  Negative for diarrhea, nausea and vomiting.   Skin:  Positive for wound.     Objective:     Vital Signs (Most Recent):  Temp: 98.5 °F (36.9 °C) (01/06/25 1140)  Pulse: 65 (01/06/25 1140)  Resp: 20 (01/06/25 1140)  BP: 108/67 (01/06/25 1140)  SpO2: 98 % (01/06/25 1140) Vital Signs (24h Range):  Temp:  [97.8 °F (36.6 °C)-98.6 °F (37 °C)] 98.5 °F (36.9 °C)  Pulse:  [65-73] 65  Resp:  [17-20] 20  SpO2:  [94 %-99 %] 98 %  BP: (102-122)/(66-76) 108/67     Weight: 108.4 kg (238 lb 15.7 oz)  Body mass index is 37.43 kg/m².    Estimated Creatinine Clearance: 21.2 mL/min (A) (based on SCr of 3.8 mg/dL (H)).     Physical Exam  Constitutional:       Appearance: Normal appearance.   HENT:      Head: Normocephalic.      Mouth/Throat:      Mouth: Mucous membranes are moist.      Pharynx: Oropharynx is clear.   Eyes:      Pupils: Pupils are equal, round, and reactive to light.   Cardiovascular:      Rate and Rhythm: Normal rate and regular rhythm.      Pulses: Normal pulses.      Heart sounds: Normal heart sounds.   Pulmonary:      Effort: Pulmonary effort is normal.      Breath sounds: Normal breath sounds.   Abdominal:      General: Bowel sounds are normal.      Palpations: Abdomen is soft.   Musculoskeletal:         General: Normal range of motion.      Cervical back: Normal range of motion.   Skin:     General: Skin is warm and dry.      Capillary Refill: Capillary refill takes less than 2 seconds.      Comments: Left foot bandaged postoperatively   Neurological:      General: No focal deficit present.      Mental Status: He is alert and oriented to person, place, and time. Mental status is at baseline.   Psychiatric:         Mood and Affect: Mood normal.         Behavior: Behavior normal.          Significant  Labs: All pertinent labs within the past 24 hours have been reviewed.    Significant Imaging: I have reviewed all pertinent imaging results/findings within the past 24 hours.

## 2025-01-06 NOTE — PROGRESS NOTES
01/06/25 1308   Nurse Notification   Charge Nurse Notified? Yes   Name of Charge Nurse DEANNA Dalton RN   Bedside Nurse Notified? Yes   Name of Bedside Nurse SELENA Cano RN   Nurse Notfication Method Secure Chat   Nurse Notified Of Other  (AVS prepared and okay to print for discharge review once cleared by CM.)    Notification    Notified? Yes   Name of  JONNY Galvez    Notification Method Secure Chat    Notified Of Discharge Status

## 2025-01-06 NOTE — ASSESSMENT & PLAN NOTE
69-year-old male with a history of CKD 4, IDDM, and PAD who is admitted for left 2nd metatarsal amputation fro gangrenous toe    -cxs with enterobacter and strep group g  -would stop zosyn  -started cefepime and po flagyl  -plan to treat with cefepime and po flagyl x 5 days after amputation (stop date is 1/8)  -if path has involved margins will need to be extended  -ID will sign off

## 2025-01-06 NOTE — PROGRESS NOTES
[facility] Surg  Infectious Disease  Progress Note    Patient Name: Jian Arrieta  MRN: [id number]  Admission Date: 1/3/2025  Length of Stay: 3 days  Attending Physician: Mohinder Jimenes,*  Primary Care Provider: Reza Boyer MD    Isolation Status: No active isolations  Assessment/Plan:      Orthopedic  * Gangrene  69-year-old male with a history of CKD 4, IDDM, and PAD who is admitted for left 2nd metatarsal amputation fro gangrenous toe    -cxs with enterobacter and strep group g  -would stop zosyn  -started cefepime and po flagyl  -plan to treat with cefepime and po flagyl x 5 days after amputation (stop date is 1/8)  -if path has involved margins will need to be extended  -ID will sign off          Thank you for your consult. I will sign off. Please contact us if you have any additional questions.    Chris Schumacher MD  Infectious Disease  [facility] Surg    Subjective:     Principal Problem:Gangrene    HPI: 69-year-old male with a history of CKD 4, IDDM, and PAF who presented as direct admit from Podiatry. Admitted for left 2nd metatarsal amputation for gangrenous toe. Patient was evaluated at bedside postoperatively denies any pain. Patient has insulin pump which apparently came dislodged last night which is his reading for uncontrolled glucose. On vanco and zosyn  Interval History: No acute events    Review of Systems   Constitutional:  Negative for chills and fever.   Respiratory:  Negative for shortness of breath.    Cardiovascular:  Negative for chest pain.   Gastrointestinal:  Negative for diarrhea, nausea and vomiting.   Skin:  Positive for wound.     Objective:     Vital Signs (Most Recent):  Temp: 98.5 °F (36.9 °C) (01/06/25 1140)  Pulse: 65 (01/06/25 1140)  Resp: 20 (01/06/25 1140)  BP: 108/67 (01/06/25 1140)  SpO2: 98 % (01/06/25 1140) Vital Signs (24h Range):  Temp:  [97.8 °F (36.6 °C)-98.6 °F (37 °C)] 98.5 °F (36.9 °C)  Pulse:  [65-73] 65  Resp:  [17-20] 20  SpO2:  [94 %-99 %] 98  %  BP: (102-122)/(66-76) 108/67     Weight: 108.4 kg (238 lb 15.7 oz)  Body mass index is 37.43 kg/m².    Estimated Creatinine Clearance: 21.2 mL/min (A) (based on SCr of 3.8 mg/dL (H)).     Physical Exam  Constitutional:       Appearance: Normal appearance.   HENT:      Head: Normocephalic.      Mouth/Throat:      Mouth: Mucous membranes are moist.      Pharynx: Oropharynx is clear.   Eyes:      Pupils: Pupils are equal, round, and reactive to light.   Cardiovascular:      Rate and Rhythm: Normal rate and regular rhythm.      Pulses: Normal pulses.      Heart sounds: Normal heart sounds.   Pulmonary:      Effort: Pulmonary effort is normal.      Breath sounds: Normal breath sounds.   Abdominal:      General: Bowel sounds are normal.      Palpations: Abdomen is soft.   Musculoskeletal:         General: Normal range of motion.      Cervical back: Normal range of motion.   Skin:     General: Skin is warm and dry.      Capillary Refill: Capillary refill takes less than 2 seconds.      Comments: Left foot bandaged postoperatively   Neurological:      General: No focal deficit present.      Mental Status: He is alert and oriented to person, place, and time. Mental status is at baseline.   Psychiatric:         Mood and Affect: Mood normal.         Behavior: Behavior normal.          Significant Labs: All pertinent labs within the past 24 hours have been reviewed.    Significant Imaging: I have reviewed all pertinent imaging results/findings within the past 24 hours.

## 2025-01-07 ENCOUNTER — TELEPHONE (OUTPATIENT)
Dept: WOUND CARE | Facility: HOSPITAL | Age: 70
End: 2025-01-07
Payer: MEDICARE

## 2025-01-07 LAB
FINAL PATHOLOGIC DIAGNOSIS: NORMAL
GROSS: NORMAL
Lab: NORMAL

## 2025-01-07 NOTE — TELEPHONE ENCOUNTER
Spoke with patient regarding follow up appt with Dr. Felton next week. Patient aware. Patient states he has not been admitted by HH - he was given number to [facility] to contact them. Patient states he does not feel comfortable giving himself IV antibiotics - instructed him to contact HH and also to wait until his wife is home to assist - verbalized understanding.

## 2025-01-07 NOTE — NURSING
Attempted to reach patients' spouse x 2 concerning the pharmacy chosen for meds. The MD and virtual nurse assure me that the prescription for meds were sent to the pharmacy they chose.

## 2025-01-07 NOTE — NURSING
Patient discharged home per orders. Midline left in place per orders for home abx administration. AVS printed and given to patient, and reviewed with virtual nurse.

## 2025-01-08 LAB — BACTERIA SPEC ANAEROBE CULT: NORMAL

## 2025-01-09 LAB — FUNGUS SPEC CULT: NORMAL

## 2025-01-10 NOTE — PROGRESS NOTES
No evidence of DIC at this time, with ongoing thrombocytopenia now status post platelet administration with improvement in patient's platelets.  Idiopathic thrombocytopenia this time, we will involve Hematology service.  Continue to transfuse as needed.   Patiromer 8.4 g daily ordered for hyperK.

## 2025-01-14 ENCOUNTER — HOSPITAL ENCOUNTER (EMERGENCY)
Facility: HOSPITAL | Age: 70
Discharge: HOME | End: 2025-01-14
Attending: EMERGENCY MEDICINE
Payer: MEDICARE

## 2025-01-14 ENCOUNTER — HOSPITAL ENCOUNTER (OUTPATIENT)
Dept: WOUND CARE | Facility: HOSPITAL | Age: 70
Discharge: HOME | End: 2025-01-14
Attending: STUDENT IN AN ORGANIZED HEALTH CARE EDUCATION/TRAINING PROGRAM
Payer: MEDICARE

## 2025-01-14 VITALS
SYSTOLIC BLOOD PRESSURE: 158 MMHG | WEIGHT: 238 LBS | BODY MASS INDEX: 37.35 KG/M2 | HEIGHT: 67 IN | HEART RATE: 84 BPM | OXYGEN SATURATION: 98 % | DIASTOLIC BLOOD PRESSURE: 76 MMHG | TEMPERATURE: 98 F | RESPIRATION RATE: 14 BRPM

## 2025-01-14 VITALS
HEIGHT: 67 IN | DIASTOLIC BLOOD PRESSURE: 60 MMHG | WEIGHT: 239 LBS | HEART RATE: 72 BPM | SYSTOLIC BLOOD PRESSURE: 150 MMHG | TEMPERATURE: 98 F | BODY MASS INDEX: 37.51 KG/M2

## 2025-01-14 DIAGNOSIS — Z78.9 PROBLEM WITH VASCULAR ACCESS: Primary | ICD-10-CM

## 2025-01-14 DIAGNOSIS — I25.10 CORONARY ARTERY DISEASE DUE TO CALCIFIED CORONARY LESION: Chronic | ICD-10-CM

## 2025-01-14 DIAGNOSIS — I25.84 CORONARY ARTERY DISEASE DUE TO CALCIFIED CORONARY LESION: Chronic | ICD-10-CM

## 2025-01-14 DIAGNOSIS — I96 GANGRENE OF TOE OF LEFT FOOT: ICD-10-CM

## 2025-01-14 DIAGNOSIS — I89.0 LYMPHEDEMA OF BOTH LOWER EXTREMITIES: ICD-10-CM

## 2025-01-14 DIAGNOSIS — E11.21 DIABETIC NEPHROPATHY ASSOCIATED WITH TYPE 2 DIABETES MELLITUS: ICD-10-CM

## 2025-01-14 DIAGNOSIS — M86.20 SUBACUTE OSTEOMYELITIS, UNSPECIFIED SITE: Primary | ICD-10-CM

## 2025-01-14 PROCEDURE — 36410 VNPNXR 3YR/> PHY/QHP DX/THER: CPT

## 2025-01-14 PROCEDURE — 99281 EMR DPT VST MAYX REQ PHY/QHP: CPT | Mod: 25

## 2025-01-14 PROCEDURE — 11042 DBRDMT SUBQ TIS 1ST 20SQCM/<: CPT

## 2025-01-14 PROCEDURE — 63600175 PHARM REV CODE 636 W HCPCS: Mod: JZ,TB | Performed by: EMERGENCY MEDICINE

## 2025-01-14 PROCEDURE — 11042 DBRDMT SUBQ TIS 1ST 20SQCM/<: CPT | Mod: 58,,, | Performed by: STUDENT IN AN ORGANIZED HEALTH CARE EDUCATION/TRAINING PROGRAM

## 2025-01-14 PROCEDURE — 11721 DEBRIDE NAIL 6 OR MORE: CPT

## 2025-01-14 PROCEDURE — C1751 CATH, INF, PER/CENT/MIDLINE: HCPCS

## 2025-01-14 PROCEDURE — 99214 OFFICE O/P EST MOD 30 MIN: CPT | Mod: 25,,, | Performed by: STUDENT IN AN ORGANIZED HEALTH CARE EDUCATION/TRAINING PROGRAM

## 2025-01-14 RX ORDER — SODIUM CHLORIDE 0.9 % (FLUSH) 0.9 %
10 SYRINGE (ML) INJECTION EVERY 12 HOURS PRN
Status: DISCONTINUED | OUTPATIENT
Start: 2025-01-14 | End: 2025-01-15 | Stop reason: HOSPADM

## 2025-01-14 RX ADMIN — ALTEPLASE 2 MG: 2.2 INJECTION, POWDER, LYOPHILIZED, FOR SOLUTION INTRAVENOUS at 05:01

## 2025-01-14 NOTE — FIRST PROVIDER EVALUATION
Emergency Department TeleTriage Encounter Note      CHIEF COMPLAINT    Chief Complaint   Patient presents with    Vascular Access Problem     MD sent patient to ED for PICC line evaluation. Patient's wife states it is clogged. Denies acute complaints.        VITAL SIGNS   Initial Vitals [01/14/25 1418]   BP Pulse Resp Temp SpO2   115/61 83 18 99.6 °F (37.6 °C) 99 %      MAP       --            ALLERGIES    Review of patient's allergies indicates:  No Known Allergies    PROVIDER TRIAGE NOTE  Verbal consent for the teletriage evaluation was given by the patient at the start of the evaluation.  All efforts will be made to maintain patient's privacy during the evaluation.      This is a teletriage evaluation of a 69 y.o. male presenting to the ED with c/o needs PICC flushed; has been clogged since yesterday.  Keely WEAVER, placed order for PICC and would like to be contacted. Limited physical exam via telehealth: The patient is awake, alert, answering questions appropriately and is not in respiratory distress.  As the Teletriage provider, I performed an initial assessment and ordered appropriate labs and imaging studies, if any, to facilitate the patient's care once placed in the ED. Once a room is available, care and a full evaluation will be completed by an alternate ED provider.  Any additional orders and the final disposition will be determined by that provider.  All imaging and labs will not be followed-up by the Teletriage Team, including myself.          ORDERS  Labs Reviewed - No data to display    ED Orders (720h ago, onward)      None              Virtual Visit Note: The provider triage portion of this emergency department evaluation and documentation was performed via [employer], a HIPAA-compliant telemedicine application, in concert with a tele-presenter in the room. A face to face patient evaluation with one of my colleagues will occur once the patient is placed in an emergency department  room.      DISCLAIMER: This note was prepared with Vu[employer] voice recognition transcription software. Garbled syntax, mangled pronouns, and other bizarre constructions may be attributed to that software system.

## 2025-01-14 NOTE — ED NOTES
Pt arrived to ED with c/o clogged PICC line. Denies pain, SOB, acute complaints. Pt AAOx4, NAD noted. Call light within reach.

## 2025-01-14 NOTE — PROCEDURES
Debridement    Date/Time: 1/14/2025 1:00 PM    Performed by: Savanah Felton DPM  Authorized by: Savanah Felton DPM    Consent Done?:  Yes (Verbal)  Local anesthesia used?: No      Wound Details:    Location:  Left foot    Location:  Left 2nd Metatarsal Head    Type of Debridement:  Excisional       Length (cm):  0.2       Width (cm):  0.2       Depth (cm):  0.2       Area (sq cm):  0.04       Percent Debrided (%):  100       Total Area Debrided (sq cm):  0.04    Depth of debridement:  Subcutaneous tissue    Tissue debrided:  Subcutaneous and Other    Devitalized tissue debrided:  Biofilm, Callus and Fibrin    Instruments:  Blade and Curette  Bleeding:  Minimal  Patient tolerance:  Patient tolerated the procedure well with no immediate complications     No cultures were taken during this visit         Assisted by: Fernie Hudson PGY1

## 2025-01-14 NOTE — ED PROVIDER NOTES
Emergency Department Provider Note    Jian Arrieta   69 y.o. male   [id number]      1/14/2025       History     This history was obtained from the patient without limitations.  He was driven to the ED by a family member.    He is a 69-year-old with the below past medical history.  He is currently receiving daily antibiotics through a right upper arm midline.  He presents because the midline stopped working two days ago when he forgot to flush it with heparin following use.  He denies pain at the insertion site and pain to the right upper extremity.  He denies fever, chills, nausea, vomiting, headache, and dizziness.    He denies tobacco, alcohol, and recreational drug use.         Past Medical History:   Diagnosis Date    Alcohol abuse     Anasarca 1/28/2019    Anemia     Anticoagulant long-term use     Arthropathy associated with neurological disorder 9/2/2015    Atherosclerosis     Charcot foot due to diabetes mellitus     Chronic combined systolic and diastolic heart failure 01/29/2019 1-28-19 Left VentricleModerate decreased ejection fraction at 30%. Normal left ventricular cavity size. Normal wall thickness observed. Grade I (mild) left ventricular diastolic dysfunction consistent with impaired relaxation. Normal left atrial pressure. Moderate, global hypokinesis(see wall scoring diagram). Right VentricleNormal cavity size, wall thickness and ejection fraction. Wall motion n    Chronic pancreatitis 1/28/2019    CKD (chronic kidney disease) stage 3, GFR 30-59 ml/min     CKD (chronic kidney disease) stage 4, GFR 15-29 ml/min     Colon polyps     approx 5 yrs ago    Coronary artery disease due to calcified coronary lesion 05/08/2015    5 stents on ASA      Diabetic polyneuropathy associated with type 2 diabetes mellitus 9/2/2015    Diverticulosis 1/28/2019    DM type 2 with diabetic peripheral neuropathy 2/4/2019    Encounter for blood transfusion     Essential hypertension 1/28/2019    Former smoker 8/26/2015     Healed ulcer of left foot on examination 6/20/2017    Hematuria     Hydrocele     approx 1.5 yrs ago    Hyperphosphatemia     Hypoalbuminemia 2/4/2019    Hypocalcemia     Lymphedema of both lower extremities 1/29/2019    Mixed hyperlipidemia 5/8/2015    Morbid obesity with BMI of 50.0-59.9, adult 5/8/2015    Obstruction of right ureteropelvic junction (UPJ) due to stone 5/24/2021    Onychomycosis of multiple toenails with type 2 diabetes mellitus and peripheral neuropathy 6/20/2017    Perianal cyst     approx 2 yrs ago    Proteinuria     Pseudocyst of pancreas 1/28/2019 1-28-19 Liver has a cirrhotic morphology with no focal lesions.  Significant interval increase in ascites when compared to prior exam which may account for patient's abdominal distension.  Hypodense air-fluid collection along the body of the pancreas which is slightly smaller when compared to prior CT.  Findings may relate to pancreatic necrosis with pancreatic pseudocysts with infected pseudocyst    Skin cancer     skin cancer    Sleep apnea 8/26/2015    Status post bariatric surgery 1/11/2016    Type 2 diabetes mellitus, with long-term current use of insulin 5/8/2015    Urinary tract infection       Past Surgical History:   Procedure Laterality Date    ANGIOGRAPHY N/A 6/28/2019    Procedure: ANGIOGRAM-PV STENT;  Surgeon: Ewa Diagnostic Provider;  Location: [facility] OR;  Service: Radiology;  Laterality: N/A;    ANGIOPLASTY      total x5 stents    AORTOGRAPHY WITH SERIALOGRAPHY N/A 11/26/2024    Procedure: AORTOGRAM, WITH SERIALOGRAPHY;  Surgeon: Clinton Gibbs MD;  Location: [facility] CATH LAB/EP;  Service: Cardiology;  Laterality: N/A;    COLONOSCOPY N/A 10/6/2015    Procedure: COLONOSCOPY;  Surgeon: Shekhar Richards MD;  [facility]  Service: Endoscopy;  Laterality: N/A;  BMI over 55/2nd floor case    5 day hold Plavix, Dr Kwadwo Arroyo    COLONOSCOPY N/A 5/13/2021    Procedure: COLONOSCOPY;  Surgeon: Huan Brumfield MD;  Location:  [facility] ENDO;  Service: Endoscopy;  Laterality: N/A;    CORONARY ANGIOGRAPHY Right 3/20/2019    Procedure: ANGIOGRAM, CORONARY ARTERY;  Surgeon: Bob Duque MD;  Location: [facility] CATH LAB;  Service: Cardiology;  Laterality: Right;    CORONARY ARTERY BYPASS GRAFT  2017    x3    CORONARY BYPASS GRAFT ANGIOGRAPHY  3/20/2019    Procedure: Bypass graft study;  Surgeon: Bob Duque MD;  Location: [facility] CATH LAB;  Service: Cardiology;;    CYST REMOVAL      CYSTOSCOPY Right 6/30/2021    Procedure: CYSTOSCOPY;  Surgeon: William Diaz MD;  Location: [facility] OR 1ST FLR;  Service: Urology;  Laterality: Right;    CYSTOSCOPY N/A 10/16/2023    Procedure: CYSTOSCOPY;  Surgeon: Chrystal Dias MD;  [facility];  Service: Urology;  Laterality: N/A;    CYSTOSCOPY N/A 12/6/2023    Procedure: CYSTOSCOPY;  Surgeon: William Diaz MD;  Location: [facility] OR 1ST FLR;  Service: Urology;  Laterality: N/A;    CYSTOSCOPY W/ URETERAL STENT PLACEMENT Right 6/16/2021    Procedure: CYSTOSCOPY, WITH URETERAL STENT INSERTION;  Surgeon: William Diaz MD;  Location: [facility] OR 2ND FLR;  Service: Urology;  Laterality: Right;  FLUORO LESS THAN 1 HOUR    ENDOSCOPIC ULTRASOUND OF UPPER GASTROINTESTINAL TRACT N/A 2/26/2020    Procedure: ULTRASOUND, UPPER GI TRACT, ENDOSCOPIC;  Surgeon: Robbie Yang MD;  Location: [facility];  Service: Endoscopy;  Laterality: N/A;    ESOPHAGOGASTRODUODENOSCOPY N/A 7/8/2019    Procedure: ESOPHAGOGASTRODUODENOSCOPY (EGD);  Surgeon: Huan Brumfield MD;  Location: [facility];  Service: Endoscopy;  Laterality: N/A;    ESOPHAGOGASTRODUODENOSCOPY N/A 5/13/2021    Procedure: EGD (ESOPHAGOGASTRODUODENOSCOPY);  Surgeon: Huan Brumfield MD;  Location: [facility] ENDO;  Service: Endoscopy;  Laterality: N/A;    EXCISION OF HYDROCELE Bilateral 6/16/2021    Procedure: HYDROCELE REPAIR;  Surgeon: William Diaz MD;  Location: [facility] OR 2ND FLR;  Service: Urology;  Laterality: Bilateral;  2 HOURS    EXTRACTION - STONE  Right 12/6/2023    Procedure: EXTRACTION - STONE;  Surgeon: William Diaz MD;  Location: [facility] OR 1ST FLR;  Service: Urology;  Laterality: Right;    FLUOROSCOPY N/A 6/16/2021    Procedure: FLUOROSCOPY;  Surgeon: William Diaz MD;  Location: [facility] OR 2ND FLR;  Service: Urology;  Laterality: N/A;    GASTRECTOMY      INSERTION OF DIALYSIS CATHETER N/A 5/17/2019    Procedure: pleurx;  Surgeon: [facility] Diagnostic Provider;  Location: [facility] OR 2ND FLR;  Service: General;  Laterality: N/A;  Room 188/Sand    KNEE ARTHROSCOPY      LAPAROSCOPIC CHOLECYSTECTOMY N/A 5/4/2020    Procedure: CHOLECYSTECTOMY, LAPAROSCOPIC;  Surgeon: SON Rowe MD;  Location: [facility] OR;  Service: General;  Laterality: N/A;    LASER LITHOTRIPSY N/A 6/30/2021    Procedure: LITHOTRIPSY, USING LASER;  Surgeon: William Diaz MD;  Location: [facility] OR 1ST FLR;  Service: Urology;  Laterality: N/A;    LIVER BIOPSY N/A 5/4/2020    Procedure: BIOPSY, LIVER, Laproscopic ;  Surgeon: SON Rowe MD;  Location: [facility] OR;  Service: General;  Laterality: N/A;    perianal surgery      perianal cyst removed    PYELOSCOPY Right 6/30/2021    Procedure: PYELOSCOPY;  Surgeon: William Diaz MD;  Location: [facility] OR 1ST FLR;  Service: Urology;  Laterality: Right;    PYELOSCOPY Right 10/16/2023    Procedure: PYELOSCOPY;  Surgeon: Chrystal Dias MD;  Location: [facility] OR 1ST FLR;  Service: Urology;  Laterality: Right;    PYELOSCOPY Right 12/6/2023    Procedure: PYELOSCOPY;  Surgeon: William Diaz MD;  Location: [facility] OR 1ST FLR;  Service: Urology;  Laterality: Right;    REMOVAL-STENT Right 12/6/2023    Procedure: REMOVAL-STENT;  Surgeon: William Diaz MD;  Location: [facility] OR 1ST FLR;  Service: Urology;  Laterality: Right;    REPLACEMENT OF STENT Right 6/30/2021    Procedure: REPLACEMENT, STENT;  Surgeon: William Diaz MD;  Location: [facility] OR 1ST FLR;  Service: Urology;  Laterality: Right;    RETROGRADE PYELOGRAPHY Right 10/16/2023    Procedure: PYELOGRAM,  RETROGRADE;  Surgeon: Chrystal Dias MD;  Location: [facility] OR 1ST FLR;  Service: Urology;  Laterality: Right;    RETROGRADE PYELOGRAPHY Right 2023    Procedure: PYELOGRAM, RETROGRADE;  Surgeon: William Diaz MD;  Location: [facility] OR 1ST FLR;  Service: Urology;  Laterality: Right;    TOE AMPUTATION Left 1/3/2025    Procedure: AMPUTATION, TOE;  Surgeon: Savanah Felton DPM;  Location: [facility] OR;  Service: Podiatry;  Laterality: Left;    URETERAL STENT PLACEMENT Right 10/16/2023    Procedure: INSERTION, STENT, URETER;  Surgeon: Chrystal Dias MD;  [facility];  Service: Urology;  Laterality: Right;    URETEROSCOPY Right 2021    Procedure: URETEROSCOPY FLEXIBLE URETEROSCOPE;  Surgeon: William Diaz MD;  [facility];  Service: Urology;  Laterality: Right;    URETEROSCOPY Right 10/16/2023    Procedure: URETEROSCOPY;  Surgeon: Chrystal Dias MD;  Location: [facility] OR [facility] FLR;  Service: Urology;  Laterality: Right;    URETEROSCOPY Right 2023    Procedure: URETEROSCOPY;  Surgeon: William Diaz MD;  [facility];  Service: Urology;  Laterality: Right;      Family History   Problem Relation Name Age of Onset    Cancer Mother      Cancer Father      Heart disease Father      Obesity Sister      Parkinsonism Brother      No Known Problems Paternal Grandmother      Cancer Paternal Grandfather      Cancer Brother      Diabetes Maternal Grandmother      Stroke Maternal Grandfather      Cirrhosis Neg Hx        Social History     Socioeconomic History    Marital status:    Tobacco Use    Smoking status: Former     Current packs/day: 0.00     Average packs/day: 2.0 packs/day for 30.0 years (60.0 ttl pk-yrs)     Types: Cigarettes     Start date: 1975     Quit date: 2005     Years since quittin.9    Smokeless tobacco: Never   Substance and Sexual Activity    Alcohol use: No     Comment: started ~, reports 1 shot daily, max 3 shots  daily, vague about alcohol consumption. Last drink 9/2018    Drug use: No     Social Drivers of Health     Financial Resource Strain: Patient Declined (1/3/2025)    Overall Financial Resource Strain (CARDIA)     Difficulty of Paying Living Expenses: Patient declined   Food Insecurity: No Food Insecurity (1/6/2025)    Hunger Vital Sign     Worried About Running Out of Food in the Last Year: Never true     Ran Out of Food in the Last Year: Never true   Transportation Needs: No Transportation Needs (1/6/2025)    TRANSPORTATION NEEDS     Transportation : No   Physical Activity: Patient Declined (11/27/2024)    Exercise Vital Sign     Days of Exercise per Week: Patient declined     Minutes of Exercise per Session: Patient declined   Stress: Stress Concern Present (1/6/2025)    [employer] - Occupational Stress Questionnaire     Feeling of Stress : To some extent   Housing Stability: Low Risk  (1/6/2025)    Housing Stability Vital Sign     Unable to Pay for Housing in the Last Year: No     Homeless in the Last Year: No      Review of patient's allergies indicates:  No Known Allergies        Physical Examination     Initial Vitals [01/14/25 1418]   BP Pulse Resp Temp SpO2   115/61 83 18 99.6 °F (37.6 °C) 99 %      MAP       --           Physical Exam    Nursing note and vitals reviewed.  Constitutional: He is not diaphoretic. No distress.   HENT: Mouth/Throat: Oropharynx is clear and moist.   Eyes: Conjunctivae are normal.   Cardiovascular:  Normal rate, regular rhythm and normal heart sounds.     Exam reveals no gallop and no friction rub.       No murmur heard.  Pulmonary/Chest: No respiratory distress. He has no wheezes. He has no rhonchi.   Musculoskeletal:      Right upper arm: No swelling, deformity or tenderness.      Comments: Midline inserted to the medial surface of the right upper arm.  No drainage at the insertion site.  No erythema.  No tenderness.     Neurological: He is alert and  oriented to person, place, and time. GCS score is 15. GCS eye subscore is 4. GCS verbal subscore is 5. GCS motor subscore is 6.   Skin: Skin is warm and dry. No pallor.   Psychiatric: He has a normal mood and affect. His speech is normal and behavior is normal. He is not actively hallucinating. He is attentive.            Labs     None     Imaging     Imaging Results    None           ED Course     The patient received the following medications:  Medications   sodium chloride 0.9% flush 10 mL (has no administration in time range)   alteplase injection 2 mg (2 mg Other Given 1/14/25 5759)                 Medical Decision Making                 Medical Decision Making  Attempted to treat the patient's existing midline with altered place.  Unfortunately, there was persistent inability to aspirate or flush the line following this treatment.  A new midline was placed successfully.    Risk  Prescription drug management.              Diagnoses       ICD-10-CM ICD-9-CM   1. Problem with vascular access  Z78.9 V49.9         Dispostion      ED Disposition Condition    Discharge Stable            ED Prescriptions    None         Follow-up Information       Follow up With Specialties Details Why Contact Info    Reza Boyer MD Internal Medicine  Schedule a close in-person or virtual ER follow-up visit with your primary care provider. [address]  [address]  [place] [address]  [phone]      ER   Return to the ER for any concerns that you feel need immediate attention.               Timoteo Cortes III, MD  01/14/25 [id number]

## 2025-01-14 NOTE — PROGRESS NOTES
Wound Care & Hyperbaric Medicine    Subjective:       Patient ID: Jian Arrieta is a 69 y.o. male.    Chief Complaint: Diabetic Foot Ulcer and Non-healing Wound Follow Up    Follow up after recent left 2nd toe amputation.  Sutures in place.  Wound debrided.  Tolerated well.MD contacted DR Schumacher, ID, regarding the need to follow up.  Phone number for ID office given to patient. Reported that his PICC line blocked since yesterday.  Sent to ED for PICC line declotting or replacement.  Need to minimize ambulation to BRP discussed with patient and his sister.  Verbalized understanding.  Orders routed to home health.        Objective:      Physical Exam  Constitutional:       General: He is not in acute distress.     Appearance: Normal appearance. He is well-developed. He is not diaphoretic.   Cardiovascular:      Comments: Dorsalis pedis and posterior tibial pulses are mildly diminished. Skin temperature is within normal limits. Toes are cool to touch and feet are warm proximally. Hair growth is diminished. Skin is mildly atrophic and with mild hyperpigmentation.lymphedema noted, bilaterally.   Musculoskeletal:         General: No tenderness.      Comments: Adequate joint range of motion without pain, limitation, nor crepitation to foot and ankle joints. Muscle strength is 5/5 in all groups bilaterally.       Feet:      Right foot:      Skin integrity: Dry skin present. No ulcer, blister, erythema or warmth.      Left foot:      Skin integrity: Dry skin present. No ulcer, blister, erythema or warmth.   Skin:     General: Skin is warm and dry.      Capillary Refill: Capillary refill takes less than 2 seconds.      Comments: Skin is warm and dry, no acute signs of infection noted bilaterally      Toenails are well trimmed and of normal morphology    See wound description below    Otherwise, no open wounds, macerations or hyperkeratotic lesions, bilaterally            Neurological:      Mental Status:  He is alert and oriented to person, place, and time.      Sensory: Sensory deficit present.      Motor: No abnormal muscle tone.      Comments: Light touch within normal limits. [place]-Aurelio 5.07 monofilamant testing is diminished. Vibratory sensation is diminished bilaterally.   Psychiatric:         Mood and Affect: Mood normal.         Behavior: Behavior normal.         Thought Content: Thought content normal.            Wound 01/14/25 1357 Non pressure chronic ulcer Left Toe, second (Active)   01/14/25 1357 Toe, second   Present on Original Admission:    Primary Wound Type: Non pressure   Side: Left   Orientation:    Wound Approximate Age at First Assessment (Weeks):    Wound Number:    Is this injury device related?:    Incision Type:    Closure Method:    Wound Description (Comments):    Type:    Additional Comments:    Ankle-Brachial Index:    Pulses:    Removal Indication and Assessment:    Wound Outcome:    Wound Image    01/14/25 1356   Dressing Appearance Moist drainage 01/14/25 1356   Drainage Amount Moderate 01/14/25 1356   Drainage Characteristics/Odor Serosanguineous 01/14/25 1356   Appearance Red;Sutures intact 01/14/25 1356   Tissue loss description Full thickness 01/14/25 1356   Red (%), Wound Tissue Color 100 % 01/14/25 1356   Periwound Area Dry;Intact;Edematous 01/14/25 1356   Wound Edges Defined;Open 01/14/25 1356   Wound Length (cm) 1.5 cm 01/14/25 1356   Wound Width (cm) 0.3 cm 01/14/25 1356   Wound Depth (cm) 2.6 cm 01/14/25 1356   Wound Volume (cm^3) 1.17 cm^3 01/14/25 1356   Wound Surface Area (cm^2) 0.45 cm^2 01/14/25 1356   Care Cleansed with:;Soap and water;Sterile normal saline;Debrided 01/14/25 1356   Dressing Applied;Hydrofiber 01/14/25 1356   Periwound Care Absorptive dressing applied 01/14/25 1356   Compression Tubular elasticized bandage 01/14/25 1356   Off Loading Football dressing 01/14/25 1356   Dressing Change Due 01/17/25 01/14/25 1356         Assessment/Plan:          ICD-10-CM ICD-9-CM   1. Subacute osteomyelitis, unspecified site  M86.20 730.00   2. Gangrene of toe of left foot  I96 785.4   3. Diabetic nephropathy associated with type 2 diabetes mellitus  E11.21 250.40     583.81   4. Lymphedema of both lower extremities  I89.0 457.1   5. Coronary artery disease due to calcified coronary lesion  I25.10 414.00    I25.84 414.4         Tissue pathology and/or culture taken:   [] Yes    [x] No   Sharp debridement performed:    [x] Yes    [] No   Labs ordered this visit:    [] Yes    [x] No   Imaging ordered this visit:    [] Yes    [x] No     Is the wound improving?    [] Yes    [x] No   Any signs of infection?    [] Yes    [x] No             Orders Placed This Encounter   Procedures    Debridement     This order was created via procedure documentation     Standing Status:   Standing     Number of Occurrences:   1    Change dressing     Left 2nd toe amputation site    Cleanse wound with: NS  Lidocaine:prn  Periwound care: prn  Primary dressing: Hydrofera Ready  Secondary dressing:football dressing  Offloading: Darco shoe  Edema control: Tubigrip size G double layer  Frequency: 2 x week  Follow-up: 2 weeks Dr Felton  Home Health: Please patient 2 x week when not seen in clinic and weekly when seen in clinic    Other Orders: patient is to go to ED for PICC line replacement; continue IV and PO ABT as per ID; follow up with Dr Schumacher        Follow up in about 2 weeks (around 1/28/2025) for Dr Felton.          Pathology result proximal margin shows acute and chronic osteomyelitis and soft tissue necrosis. Recommend 6 weeks of antibiotics for osteomyelitis. Spoke to Dr. Schumacher who will manage this.   Wound care per above  Rest, elevate  PWB to heel only for short distances to use bathroom. Otherwise, NWB  This includes face to face time and non-face to face time preparing to see the patient (eg, review of tests), obtaining and/or reviewing separately obtained history, documenting  clinical information in the electronic or other health record, independently interpreting results and communicating results to the patient/family/caregiver, or care coordinator.  Total time spent  > 30 minutes

## 2025-01-15 ENCOUNTER — TELEPHONE (OUTPATIENT)
Dept: INFECTIOUS DISEASES | Facility: CLINIC | Age: 70
End: 2025-01-15
Payer: MEDICARE

## 2025-01-15 NOTE — ED NOTES
Cathflo administered 30 minutes ago. Attempted to aspirate PICC line, line does not aspirate. Flushed 10ml of NS in PICC line, still unable to aspirate. Dr. Cortes notified.

## 2025-01-15 NOTE — DISCHARGE INSTRUCTIONS
We know that you have many choices and are honored that you chose us. We hope that we met or exceeded your expectations and goals for this visit and will keep the [facility] family in mind for your future needs and those of your family and friends.     - Dr. Cortes

## 2025-01-15 NOTE — TELEPHONE ENCOUNTER
----- Message from Prudence sent at 1/14/2025  3:56 PM CST -----  Type:  Patient Returning Call    Who Called:Nena Jackman/ sister   Would the patient rather a call back or a response via [facility]? Call back   Best Call Back Number:[phone]   Additional Information: checking on the status of medication that Dr Schumacher suppose to order for patient and still waiting on a call to be scheduled

## 2025-01-15 NOTE — TELEPHONE ENCOUNTER
Spoke with patients sister and she would like to know if patient would like need to see Dr. Schumacher for an appointment. Patient sister said that he would need an order for antibiotics. I informed patient sister that I would send a message to MD regarding this.

## 2025-01-15 NOTE — ED NOTES
Spoke to Sebastian WEAVER about patient requesting something to eat at this time. Per Sebastian WEAVER patient can eat at this time. Patient provided with pudding and apple juice at this time.

## 2025-01-17 ENCOUNTER — PATIENT MESSAGE (OUTPATIENT)
Dept: PODIATRY | Facility: CLINIC | Age: 70
End: 2025-01-17
Payer: MEDICARE

## 2025-01-17 NOTE — TELEPHONE ENCOUNTER
Spoke with patients sister and informed her that MD has extended patients antibiotics and to contact the pharmacy in a couple of hours regarding this. Patient has been scheduled for an appointment with Dr. Schumacher on 3/17/25 at 11:00am. Also that Podiatry will be seeing him more frequently. I confirmed the appt with Nena for Podiatry on 1/28/25. Patients sister is concerned because no one is following up on patient regarding his lab. I recommended that she follow patients discharge notes and follow up with his PCP. Verbal Understanding.

## 2025-01-28 ENCOUNTER — HOSPITAL ENCOUNTER (OUTPATIENT)
Dept: WOUND CARE | Facility: HOSPITAL | Age: 70
Discharge: HOME | End: 2025-01-28
Attending: STUDENT IN AN ORGANIZED HEALTH CARE EDUCATION/TRAINING PROGRAM
Payer: MEDICARE

## 2025-01-28 VITALS
RESPIRATION RATE: 18 BRPM | DIASTOLIC BLOOD PRESSURE: 55 MMHG | SYSTOLIC BLOOD PRESSURE: 111 MMHG | TEMPERATURE: 98 F | BODY MASS INDEX: 37.37 KG/M2 | HEART RATE: 67 BPM | WEIGHT: 238.13 LBS | HEIGHT: 67 IN

## 2025-01-28 DIAGNOSIS — M86.272 SUBACUTE OSTEOMYELITIS OF LEFT FOOT: Primary | ICD-10-CM

## 2025-01-28 PROCEDURE — 99213 OFFICE O/P EST LOW 20 MIN: CPT

## 2025-01-28 NOTE — PROGRESS NOTES
Wound Care & Hyperbaric Medicine    Subjective:       Patient ID: Jian Arrieta is a 69 y.o. male.    Chief Complaint: Non-healing Wound    Wound care follow up for left 2nd toe amputation site. Sutures in place, no signs of infection. Patient remains on IV antibiotics - per [facility] staff order is until 2/14/25. Have reached out to Dr. Schumacher regarding plan of care. Labs ordered today.Sutures removed, pinpoint opening remains. Home health assisting with dressing changes. Will continue plan of care.         Objective:      Physical Exam       Wound 01/14/25 1357 Non pressure chronic ulcer Left Toe, second (Active)   01/14/25 1357 Toe, second   Present on Original Admission:    Primary Wound Type: Non pressure   Side: Left   Orientation:    Wound Approximate Age at First Assessment (Weeks):    Wound Number:    Is this injury device related?:    Incision Type:    Closure Method:    Wound Description (Comments):    Type:    Additional Comments:    Ankle-Brachial Index:    Pulses:    Removal Indication and Assessment:    Wound Outcome:    Wound Image    01/28/25 1602   Dressing Appearance Dry 01/28/25 1602   Drainage Amount None 01/28/25 1602   Appearance Sutures intact;Dry;Fibrin 01/28/25 1602   Tissue loss description Full thickness 01/28/25 1602   Wound Edges Undefined 01/28/25 1602   Wound Length (cm) 0.2 cm 01/28/25 1602   Wound Width (cm) 0.2 cm 01/28/25 1602   Wound Depth (cm) 0.1 cm 01/28/25 1602   Wound Volume (cm^3) 0.004 cm^3 01/28/25 1602   Wound Surface Area (cm^2) 0.04 cm^2 01/28/25 1602   Care Cleansed with:;Sterile normal saline;Sutures removed 01/28/25 1602   Dressing Applied;Methylene blue/gentian violet;Foam;Gauze;Cast padding;Tubular bandage 01/28/25 1602   Periwound Care Absorptive dressing applied;Moisturizer applied;Topical treatment applied 01/28/25 1602   Compression Tubular elasticized bandage 01/28/25 1602   Off Loading Football dressing 01/28/25 1602   Dressing Change  Due 01/31/25 01/28/25 1602         Assessment/Plan:         ICD-10-CM ICD-9-CM   1. Subacute osteomyelitis of left foot  M86.272 730.07         Tissue pathology and/or culture taken:   [] Yes    [x] No   Sharp debridement performed:    [] Yes    [x] No   Labs ordered this visit:    [x] Yes    [] No   Imaging ordered this visit:    [] Yes    [x] No     Is the wound improving?    [x] Yes    [] No   Any signs of infection?    [] Yes    [x] No             Orders Placed This Encounter   Procedures    CBC W/ AUTO DIFFERENTIAL     Standing Status:   Future     Number of Occurrences:   1     Standing Expiration Date:   4/28/2026    COMPREHENSIVE METABOLIC PANEL     Standing Status:   Future     Number of Occurrences:   1     Standing Expiration Date:   4/28/2026     Order Specific Question:   Send normal result to authorizing provider's In Basket if patient is active on MyChart:     Answer:   Yes    Sedimentation rate     Standing Status:   Future     Number of Occurrences:   1     Standing Expiration Date:   4/28/2026     Order Specific Question:   Send normal result to authorizing provider's In Basket if patient is active on MyChart:     Answer:   Yes    C-REACTIVE PROTEIN     Standing Status:   Future     Number of Occurrences:   1     Standing Expiration Date:   4/28/2026     Order Specific Question:   Send normal result to authorizing provider's In Basket if patient is active on MyChart:     Answer:   Yes    Change dressing     Left 2nd toe amputation site     Cleanse wound with: NS   Lidocaine:prn   Periwound care: GV to periwound, lotion to leg  Primary dressing: Hydrofera Blue Ready   Secondary dressing:gauze, foam to plantar forefoot, cast padding x 2   Offloading: Darco shoe   Edema control: Tubigrip size G double layer   Frequency: 2 x week   Follow-up: 1 week with Dr Felton   [facility]: [facility]: Please see patient 2 x week when not seen in clinic and weekly when seen in clinic. Discontinue PT and OT      Other Orders:CMP, CBC, ESR, CRP. Patient has follow up with Dr. Schumacher on 3/17/25. End date of IV antibiotics is 2/14/25        Follow up in about 1 week (around 2/4/2025) for .            This includes face to face time and non-face to face time preparing to see the patient (eg, review of tests), obtaining and/or reviewing separately obtained history, documenting clinical information in the electronic or other health record, independently interpreting results and communicating results to the patient/family/caregiver, or care coordinator.  Total time spent  > 30 minutes

## 2025-02-04 ENCOUNTER — LAB VISIT (OUTPATIENT)
Dept: LAB | Facility: HOSPITAL | Age: 70
End: 2025-02-04
Attending: STUDENT IN AN ORGANIZED HEALTH CARE EDUCATION/TRAINING PROGRAM
Payer: MEDICARE

## 2025-02-04 ENCOUNTER — HOSPITAL ENCOUNTER (OUTPATIENT)
Dept: WOUND CARE | Facility: HOSPITAL | Age: 70
Discharge: HOME | End: 2025-02-04
Attending: STUDENT IN AN ORGANIZED HEALTH CARE EDUCATION/TRAINING PROGRAM
Payer: MEDICARE

## 2025-02-04 VITALS
SYSTOLIC BLOOD PRESSURE: 151 MMHG | HEART RATE: 85 BPM | TEMPERATURE: 98 F | WEIGHT: 238 LBS | DIASTOLIC BLOOD PRESSURE: 74 MMHG | BODY MASS INDEX: 37.35 KG/M2 | HEIGHT: 67 IN

## 2025-02-04 DIAGNOSIS — M86.272 SUBACUTE OSTEOMYELITIS OF LEFT FOOT: ICD-10-CM

## 2025-02-04 DIAGNOSIS — I89.0 LYMPHEDEMA OF BOTH LOWER EXTREMITIES: ICD-10-CM

## 2025-02-04 DIAGNOSIS — I96 GANGRENE OF TOE OF LEFT FOOT: ICD-10-CM

## 2025-02-04 DIAGNOSIS — Z87.828 HEALED WOUND: Primary | ICD-10-CM

## 2025-02-04 DIAGNOSIS — N18.5 CHRONIC KIDNEY DISEASE, STAGE V: Primary | ICD-10-CM

## 2025-02-04 DIAGNOSIS — E11.21 DIABETIC NEPHROPATHY ASSOCIATED WITH TYPE 2 DIABETES MELLITUS: ICD-10-CM

## 2025-02-04 LAB
ALBUMIN SERPL BCP-MCNC: 2.8 G/DL (ref 3.5–5.2)
ANION GAP SERPL CALC-SCNC: 12 MMOL/L (ref 8–16)
BUN SERPL-MCNC: 69 MG/DL (ref 8–23)
CALCIUM SERPL-MCNC: 8.3 MG/DL (ref 8.7–10.5)
CHLORIDE SERPL-SCNC: 102 MMOL/L (ref 95–110)
CO2 SERPL-SCNC: 22 MMOL/L (ref 23–29)
CREAT SERPL-MCNC: 5.5 MG/DL (ref 0.5–1.4)
EST. GFR  (NO RACE VARIABLE): 10 ML/MIN/1.73 M^2
GLUCOSE SERPL-MCNC: 304 MG/DL (ref 70–110)
PHOSPHATE SERPL-MCNC: 8.5 MG/DL (ref 2.7–4.5)
POTASSIUM SERPL-SCNC: 4.1 MMOL/L (ref 3.5–5.1)
SODIUM SERPL-SCNC: 136 MMOL/L (ref 136–145)

## 2025-02-04 PROCEDURE — 36415 COLL VENOUS BLD VENIPUNCTURE: CPT | Performed by: STUDENT IN AN ORGANIZED HEALTH CARE EDUCATION/TRAINING PROGRAM

## 2025-02-04 PROCEDURE — 99213 OFFICE O/P EST LOW 20 MIN: CPT

## 2025-02-04 PROCEDURE — 99024 POSTOP FOLLOW-UP VISIT: CPT | Mod: ,,, | Performed by: STUDENT IN AN ORGANIZED HEALTH CARE EDUCATION/TRAINING PROGRAM

## 2025-02-04 PROCEDURE — 80069 RENAL FUNCTION PANEL: CPT | Performed by: STUDENT IN AN ORGANIZED HEALTH CARE EDUCATION/TRAINING PROGRAM

## 2025-02-04 NOTE — PROGRESS NOTES
Wound Care & Hyperbaric Medicine    Subjective:       Patient ID: Jian Arrieta is a 69 y.o. male.    Chief Complaint: Non-healing Wound Follow Up    Follow up on left 2nd toe amputation site.  Incision closed.  Reinforced the need to control BS.  Patient given Dr Felton office number to call and schedule an appointment in 4 weeks.  Verbalized understanding.    Hemoglobin A1C   Date Value Ref Range Status   01/04/2025 9.7 (H) 4.0 - 5.6 % Final     Comment:     ADA Screening Guidelines:  5.7-6.4%  Consistent with prediabetes  >or=6.5%  Consistent with diabetes    High levels of fetal hemoglobin interfere with the HbA1C  assay. Heterozygous hemoglobin variants (HbS, HgC, etc)do  not significantly interfere with this assay.   However, presence of multiple variants may affect accuracy.     11/15/2024 7.9 (H) 4.0 - 5.6 % Final     Comment:     ADA Screening Guidelines:  5.7-6.4%  Consistent with prediabetes  >or=6.5%  Consistent with diabetes    High levels of fetal hemoglobin interfere with the HbA1C  assay. Heterozygous hemoglobin variants (HbS, HgC, etc)do  not significantly interfere with this assay.   However, presence of multiple variants may affect accuracy.     05/22/2024 7.2 (H) 4.0 - 5.6 % Final     Comment:     ADA Screening Guidelines:  5.7-6.4%  Consistent with prediabetes  >or=6.5%  Consistent with diabetes    High levels of fetal hemoglobin interfere with the HbA1C  assay. Heterozygous hemoglobin variants (HbS, HgC, etc)do  not significantly interfere with this assay.   However, presence of multiple variants may affect accuracy.            Objective:      Physical Exam  Constitutional:       General: He is not in acute distress.     Appearance: Normal appearance. He is well-developed. He is not diaphoretic.   Cardiovascular:      Comments: Dorsalis pedis and posterior tibial pulses are mildly diminished. Skin temperature is within normal limits. Toes are cool to touch and feet are  warm proximally. Hair growth is diminished. Skin is mildly atrophic and with mild hyperpigmentation.lymphedema noted, bilaterally.   Musculoskeletal:         General: No tenderness.      Comments: Adequate joint range of motion without pain, limitation, nor crepitation to foot and ankle joints. Muscle strength is 5/5 in all groups bilaterally.       Feet:      Right foot:      Skin integrity: Dry skin present. No ulcer, blister, erythema or warmth.      Left foot:      Skin integrity: Dry skin present. No ulcer, blister, erythema or warmth.   Skin:     General: Skin is warm and dry.      Capillary Refill: Capillary refill takes less than 2 seconds.      Comments: Skin is warm and dry, no acute signs of infection noted bilaterally      Toenails are well trimmed and of normal morphology    See wound description below    Otherwise, no open wounds, macerations or hyperkeratotic lesions, bilaterally            Neurological:      Mental Status: He is alert and oriented to person, place, and time.      Sensory: Sensory deficit present.      Motor: No abnormal muscle tone.      Comments: Light touch within normal limits. [place]-Aurelio 5.07 monofilamant testing is diminished. Vibratory sensation is diminished bilaterally.   Psychiatric:         Mood and Affect: Mood normal.         Behavior: Behavior normal.         Thought Content: Thought content normal.       [REMOVED]      Wound 01/14/25 1357 Non pressure chronic ulcer Left Toe, second (Removed)   01/14/25 1357 Toe, second   Present on Original Admission:    Primary Wound Type: Non pressure   Side: Left   Orientation:    Wound Approximate Age at First Assessment (Weeks):    Wound Number:    Is this injury device related?:    Incision Type:    Closure Method:    Wound Description (Comments):    Type:    Additional Comments:    Ankle-Brachial Index:    Pulses:    Removal Indication and Assessment:    Wound Outcome: Healed   Removed 02/04/25 1513   Wound Image   02/04/25  1512   Dressing Appearance Open to air;Dry;Intact 02/04/25 1512   Drainage Amount None 02/04/25 1512   Appearance Intact 02/04/25 1512   Periwound Area Intact;Dry 02/04/25 1512   Wound Edges Rolled/closed 02/04/25 1512   Wound Length (cm) 0 cm 02/04/25 1512   Wound Width (cm) 0 cm 02/04/25 1512   Wound Depth (cm) 0 cm 02/04/25 1512   Wound Volume (cm^3) 0 cm^3 02/04/25 1512   Wound Surface Area (cm^2) 0 cm^2 02/04/25 1512         Assessment/Plan:         ICD-10-CM ICD-9-CM   1. Subacute osteomyelitis of left foot  M86.272 730.07   2. Gangrene of toe of left foot  I96 785.4   3. Diabetic nephropathy associated with type 2 diabetes mellitus  E11.21 250.40     583.81   4. Lymphedema of both lower extremities  I89.0 457.1         Tissue pathology and/or culture taken:   [] Yes    [x] No   Sharp debridement performed:    [] Yes    [x] No   Labs ordered this visit:    [] Yes    [x] No   Imaging ordered this visit:    [] Yes    [x] No     Is the wound improving?    [] Yes    [x] No   Any signs of infection?    [] Yes    [x] No             Orders Placed This Encounter   Procedures    Change dressing     Healed Wound Instructions:Your wound is healed, it is extremely fragile at this stage; protect it from friction, wash it gently and pat dry. Call Dr Felton office, card given, and schedule an appointment in 4 weeks        No follow-ups on file.        Wound healed  May transition to supportive diabetic shoes. Daily and frequent foot checks.   Continue antibiotics per Infectious Disease recommendations   Rest, elevate PRN    This includes face to face time and non-face to face time preparing to see the patient (eg, review of tests), obtaining and/or reviewing separately obtained history, documenting clinical information in the electronic or other health record, independently interpreting results and communicating results to the patient/family/caregiver, or care coordinator.  Total time spent  > 30 minutes

## 2025-02-25 ENCOUNTER — EXTERNAL HOME HEALTH (OUTPATIENT)
Dept: HOME HEALTH SERVICES | Facility: HOSPITAL | Age: 70
End: 2025-02-25
Payer: MEDICARE

## 2025-03-06 ENCOUNTER — DOCUMENT SCAN (OUTPATIENT)
Dept: HOME HEALTH SERVICES | Facility: HOSPITAL | Age: 70
End: 2025-03-06
Payer: MEDICARE

## 2025-03-06 ENCOUNTER — OFFICE VISIT (OUTPATIENT)
Dept: PODIATRY | Facility: CLINIC | Age: 70
End: 2025-03-06
Payer: MEDICARE

## 2025-03-06 VITALS — SYSTOLIC BLOOD PRESSURE: 117 MMHG | DIASTOLIC BLOOD PRESSURE: 73 MMHG | HEART RATE: 64 BPM

## 2025-03-06 DIAGNOSIS — E11.9 ENCOUNTER FOR DIABETIC FOOT EXAM: Primary | ICD-10-CM

## 2025-03-06 PROCEDURE — 99999 PR PBB SHADOW E&M-EST. PATIENT-LVL III: CPT | Mod: PBBFAC,,, | Performed by: STUDENT IN AN ORGANIZED HEALTH CARE EDUCATION/TRAINING PROGRAM

## 2025-03-06 NOTE — PROGRESS NOTES
Subjective:     Patient ID: Jian Arrieta is a 69 y.o. male.    Chief Complaint: Diabetes Mellitus, Follow-up, and Foot Problem (After hosp. Stay and surgery)    Jian is a 69 y.o. male who presents to the clinic for evaluation and treatment of high risk feet. Jian has a past medical history of Alcohol abuse, Anasarca (1/28/2019), Anemia, Anticoagulant long-term use, Arthropathy associated with neurological disorder (9/2/2015), Atherosclerosis, Charcot foot due to diabetes mellitus, Chronic combined systolic and diastolic heart failure (01/29/2019), Chronic pancreatitis (1/28/2019), CKD (chronic kidney disease) stage 3, GFR 30-59 ml/min, CKD (chronic kidney disease) stage 4, GFR 15-29 ml/min, Colon polyps, Coronary artery disease due to calcified coronary lesion (05/08/2015), Diabetic polyneuropathy associated with type 2 diabetes mellitus (9/2/2015), Diverticulosis (1/28/2019), DM type 2 with diabetic peripheral neuropathy (2/4/2019), Encounter for blood transfusion, Essential hypertension (1/28/2019), Former smoker (8/26/2015), Healed ulcer of left foot on examination (6/20/2017), Hematuria, Hydrocele, Hyperphosphatemia, Hypoalbuminemia (2/4/2019), Hypocalcemia, Lymphedema of both lower extremities (1/29/2019), Mixed hyperlipidemia (5/8/2015), Morbid obesity with BMI of 50.0-59.9, adult (5/8/2015), Obstruction of right ureteropelvic junction (UPJ) due to stone (5/24/2021), Onychomycosis of multiple toenails with type 2 diabetes mellitus and peripheral neuropathy (6/20/2017), Perianal cyst, Proteinuria, Pseudocyst of pancreas (1/28/2019), Skin cancer, Sleep apnea (8/26/2015), Status post bariatric surgery (1/11/2016), Type 2 diabetes mellitus, with long-term current use of insulin (5/8/2015), and Urinary tract infection. The patient's chief complaint is foot ulcer, left 2nd toe with dry gangrene. This patient has documented high risk feet requiring routine maintenance secondary to diabetes mellitis and those  secondary complications of diabetes, as mentioned..    3/6/25: Seen today for annual diabetic foot exam. S/p left 2nd digit amputation, site is well healed.     PCP: Reza Boyer MD      Sign of Infection: none    Hemoglobin A1C   Date Value Ref Range Status   01/04/2025 9.7 (H) 4.0 - 5.6 % Final     Comment:     ADA Screening Guidelines:  5.7-6.4%  Consistent with prediabetes  >or=6.5%  Consistent with diabetes    High levels of fetal hemoglobin interfere with the HbA1C  assay. Heterozygous hemoglobin variants (HbS, HgC, etc)do  not significantly interfere with this assay.   However, presence of multiple variants may affect accuracy.     11/15/2024 7.9 (H) 4.0 - 5.6 % Final     Comment:     ADA Screening Guidelines:  5.7-6.4%  Consistent with prediabetes  >or=6.5%  Consistent with diabetes    High levels of fetal hemoglobin interfere with the HbA1C  assay. Heterozygous hemoglobin variants (HbS, HgC, etc)do  not significantly interfere with this assay.   However, presence of multiple variants may affect accuracy.     05/22/2024 7.2 (H) 4.0 - 5.6 % Final     Comment:     ADA Screening Guidelines:  5.7-6.4%  Consistent with prediabetes  >or=6.5%  Consistent with diabetes    High levels of fetal hemoglobin interfere with the HbA1C  assay. Heterozygous hemoglobin variants (HbS, HgC, etc)do  not significantly interfere with this assay.   However, presence of multiple variants may affect accuracy.         Review of Systems   Constitutional: Negative for chills, decreased appetite, diaphoresis and fever.   HENT:  Negative for congestion and hearing loss.    Cardiovascular:  Negative for chest pain, claudication and syncope.   Respiratory:  Negative for cough and shortness of breath.    Skin:  Positive for color change, dry skin, nail changes and poor wound healing. Negative for flushing, itching and rash.   Musculoskeletal:  Negative for back pain.   Gastrointestinal:  Negative for nausea and vomiting.    Neurological:  Positive for numbness and paresthesias. Negative for focal weakness and weakness.   Psychiatric/Behavioral:  Negative for altered mental status. The patient is not nervous/anxious.         Objective:     Physical Exam  Constitutional:       General: He is not in acute distress.     Appearance: Normal appearance. He is well-developed. He is not diaphoretic.   Cardiovascular:      Comments: Dorsalis pedis and posterior tibial pulses are mildly diminished. Skin temperature is within normal limits. Toes are cool to touch and feet are warm proximally. Hair growth is diminished. Skin is mildly atrophic and with mild hyperpigmentation.lymphedema noted, bilaterally.   Musculoskeletal:         General: No tenderness.      Comments: Adequate joint range of motion without pain, limitation, nor crepitation to foot and ankle joints. Muscle strength is 5/5 in all groups bilaterally.       Feet:      Right foot:      Skin integrity: Dry skin present. No ulcer, blister, erythema or warmth.      Left foot:      Skin integrity: Dry skin present. No ulcer, blister, erythema or warmth.   Skin:     General: Skin is warm and dry.      Capillary Refill: Capillary refill takes less than 2 seconds.      Comments: Skin is warm and dry, no acute signs of infection noted bilaterally      Toenails are well trimmed and of normal morphology    See wound description below    Otherwise, no open wounds, macerations or hyperkeratotic lesions, bilaterally            Neurological:      Mental Status: He is alert and oriented to person, place, and time.      Sensory: Sensory deficit present.      Motor: No abnormal muscle tone.      Comments: Light touch within normal limits. [employer]-A[employer] 5.07 monofilamant testing is diminished. Vibratory sensation is diminished bilaterally.   Psychiatric:         Mood and Affect: Mood normal.         Behavior: Behavior normal.         Thought Content: Thought content normal.       S/p left 2nd  digit amputation, ALL WOUNDS ARE HEALED.     PREVIOUS IMAGES:    Assessment:      Encounter Diagnosis   Name Primary?    Encounter for diabetic foot exam Yes       Plan:     Jian was seen today for diabetes mellitus, follow-up and foot problem.    Diagnoses and all orders for this visit:    Encounter for diabetic foot exam  -     Foot Exam Performed        I counseled the patient on his conditions, their implications and medical management.  Annual diabetic foot exam performed today. Shoe inspection. Diabetic Foot Education. Patient reminded of the importance of good nutrition and blood sugar control to help prevent podiatric complications of diabetes. Patient instructed on proper foot hygeine. We discussed wearing proper shoe gear, daily foot inspections, never walking without protective shoe gear, never putting sharp instruments to feet.  Return to clinic in 3-6 mo, sooner PRN

## 2025-03-17 ENCOUNTER — OFFICE VISIT (OUTPATIENT)
Dept: INFECTIOUS DISEASES | Facility: CLINIC | Age: 70
End: 2025-03-17
Payer: MEDICARE

## 2025-03-17 ENCOUNTER — DOCUMENT SCAN (OUTPATIENT)
Dept: HOME HEALTH SERVICES | Facility: HOSPITAL | Age: 70
End: 2025-03-17
Payer: MEDICARE

## 2025-03-17 VITALS
DIASTOLIC BLOOD PRESSURE: 70 MMHG | HEIGHT: 67 IN | TEMPERATURE: 97 F | SYSTOLIC BLOOD PRESSURE: 125 MMHG | BODY MASS INDEX: 37.99 KG/M2 | WEIGHT: 242.06 LBS | HEART RATE: 73 BPM

## 2025-03-17 DIAGNOSIS — I70.222 CRITICAL LIMB ISCHEMIA OF LEFT LOWER EXTREMITY: Primary | ICD-10-CM

## 2025-03-17 PROCEDURE — 99999 PR PBB SHADOW E&M-EST. PATIENT-LVL III: CPT | Mod: PBBFAC,,, | Performed by: INTERNAL MEDICINE

## 2025-03-17 RX ORDER — ALLOPURINOL 100 MG/1
100 TABLET ORAL
COMMUNITY

## 2025-03-17 NOTE — PROGRESS NOTES
[facility]  Clinic note    Patient Name: Jian Arrieta  YOB: 1955    PRESENTING HISTORY       History of Present Illness:  Mr. Jian Arrieta is a 69-year-old male with a history of CKD 4, IDDM, and PAF who was recently admitted from Podiatry for left 2nd metatarsal amputation for gangrenous toe. Cultures grew strep and enterobacter. Margins showed remaining osteo. He was placed on cefepime and flagyl x 6 weeks and finished it. He reports doing well in terms of infection since stopping the antibiotics. Wound has totally healed.     Review of Systems:  Constitutional: no fever or chills  Eyes: no visual changes  ENT: no nasal congestion or sore throat  Respiratory: no cough or shortness of breath  Cardiovascular: has angina but it has improved after adjustment of cardiology meds  Gastrointestinal: no nausea or vomiting, no abdominal pain, no constipation, no diarrhea  Genitourinary: no hematuria or dysuria  Musculoskeletal: no arthralgias or myalgias  Skin: skin cancer of left temple  Neurological: no headaches, numbness, or paresthesias    PAST HISTORY:     Past Medical History:   Diagnosis Date    Alcohol abuse     Anasarca 1/28/2019    Anemia     Anticoagulant long-term use     Arthropathy associated with neurological disorder 9/2/2015    Atherosclerosis     Charcot foot due to diabetes mellitus     Chronic combined systolic and diastolic heart failure 01/29/2019 1-28-19 Left VentricleModerate decreased ejection fraction at 30%. Normal left ventricular cavity size. Normal wall thickness observed. Grade I (mild) left ventricular diastolic dysfunction consistent with impaired relaxation. Normal left atrial pressure. Moderate, global hypokinesis(see wall scoring diagram). Right VentricleNormal cavity size, wall thickness and ejection fraction. Wall motion n    Chronic pancreatitis 1/28/2019    CKD (chronic kidney disease) stage 3, GFR 30-59 ml/min     CKD (chronic kidney disease)  stage 4, GFR 15-29 ml/min     Colon polyps     approx 5 yrs ago    Coronary artery disease due to calcified coronary lesion 05/08/2015    5 stents on ASA      Diabetic polyneuropathy associated with type 2 diabetes mellitus 9/2/2015    Diverticulosis 1/28/2019    DM type 2 with diabetic peripheral neuropathy 2/4/2019    Encounter for blood transfusion     Essential hypertension 1/28/2019    Former smoker 8/26/2015    Healed ulcer of left foot on examination 6/20/2017    Hematuria     Hydrocele     approx 1.5 yrs ago    Hyperphosphatemia     Hypoalbuminemia 2/4/2019    Hypocalcemia     Lymphedema of both lower extremities 1/29/2019    Mixed hyperlipidemia 5/8/2015    Morbid obesity with BMI of 50.0-59.9, adult 5/8/2015    Obstruction of right ureteropelvic junction (UPJ) due to stone 5/24/2021    Onychomycosis of multiple toenails with type 2 diabetes mellitus and peripheral neuropathy 6/20/2017    Perianal cyst     approx 2 yrs ago    Proteinuria     Pseudocyst of pancreas 1/28/2019 1-28-19 Liver has a cirrhotic morphology with no focal lesions.  Significant interval increase in ascites when compared to prior exam which may account for patient's abdominal distension.  Hypodense air-fluid collection along the body of the pancreas which is slightly smaller when compared to prior CT.  Findings may relate to pancreatic necrosis with pancreatic pseudocysts with infected pseudocyst    Skin cancer     skin cancer    Sleep apnea 8/26/2015    Status post bariatric surgery 1/11/2016    Type 2 diabetes mellitus, with long-term current use of insulin 5/8/2015    Urinary tract infection        Past Surgical History:   Procedure Laterality Date    ANGIOGRAPHY N/A 6/28/2019    Procedure: ANGIOGRAM-PV STENT;  Surgeon: Ewa Diagnostic Provider;  Location: [facility] OR;  Service: Radiology;  Laterality: N/A;    ANGIOPLASTY      total x5 stents    AORTOGRAPHY WITH SERIALOGRAPHY N/A 11/26/2024    Procedure: AORTOGRAM, WITH SERIALOGRAPHY;   Surgeon: Clinton Gibbs MD;  Location: [facility] CATH LAB/EP;  Service: Cardiology;  Laterality: N/A;    COLONOSCOPY N/A 10/6/2015    Procedure: COLONOSCOPY;  Surgeon: Shekhar Richards MD;  Location: [facility] (2ND FLR);  Service: Endoscopy;  Laterality: N/A;  BMI over 55/2nd floor case    5 day hold Plavix, Dr Kwadwo Arroyo    COLONOSCOPY N/A 5/13/2021    Procedure: COLONOSCOPY;  Surgeon: Huan Brumfield MD;  Location: [facility];  Service: Endoscopy;  Laterality: N/A;    CORONARY ANGIOGRAPHY Right 3/20/2019    Procedure: ANGIOGRAM, CORONARY ARTERY;  Surgeon: Bob Duque MD;  Location: [facility] CATH LAB;  Service: Cardiology;  Laterality: Right;    CORONARY ARTERY BYPASS GRAFT  2017    x3    CORONARY BYPASS GRAFT ANGIOGRAPHY  3/20/2019    Procedure: Bypass graft study;  Surgeon: Bob Duque MD;  Location: [facility] CATH LAB;  Service: Cardiology;;    CYST REMOVAL      CYSTOSCOPY Right 6/30/2021    Procedure: CYSTOSCOPY;  Surgeon: William Diaz MD;  Location: [facility] OR 1ST FLR;  Service: Urology;  Laterality: Right;    CYSTOSCOPY N/A 10/16/2023    Procedure: CYSTOSCOPY;  Surgeon: Chrystal Dias MD;  Location: [facility] OR 1ST FLR;  Service: Urology;  Laterality: N/A;    CYSTOSCOPY N/A 12/6/2023    Procedure: CYSTOSCOPY;  Surgeon: William Diaz MD;  Location: [facility] OR 1ST FLR;  Service: Urology;  Laterality: N/A;    CYSTOSCOPY W/ URETERAL STENT PLACEMENT Right 6/16/2021    Procedure: CYSTOSCOPY, WITH URETERAL STENT INSERTION;  Surgeon: William Diaz MD;  Location: [facility] OR 2ND FLR;  Service: Urology;  Laterality: Right;  FLUORO LESS THAN 1 HOUR    ENDOSCOPIC ULTRASOUND OF UPPER GASTROINTESTINAL TRACT N/A 2/26/2020    Procedure: ULTRASOUND, UPPER GI TRACT, ENDOSCOPIC;  Surgeon: Robbie Yang MD;  Location: [facility] ENDO;  Service: Endoscopy;  Laterality: N/A;    ESOPHAGOGASTRODUODENOSCOPY N/A 7/8/2019    Procedure: ESOPHAGOGASTRODUODENOSCOPY (EGD);  Surgeon: Huan Brumfield MD;  Location: [facility];  Service:  Endoscopy;  Laterality: N/A;    ESOPHAGOGASTRODUODENOSCOPY N/A 5/13/2021    Procedure: EGD (ESOPHAGOGASTRODUODENOSCOPY);  Surgeon: Huan Brumfield MD;  Location: [facility];  Service: Endoscopy;  Laterality: N/A;    EXCISION OF HYDROCELE Bilateral 6/16/2021    Procedure: HYDROCELE REPAIR;  Surgeon: William Diaz MD;  Location: [facility] OR 2ND FLR;  Service: Urology;  Laterality: Bilateral;  2 HOURS    EXTRACTION - STONE Right 12/6/2023    Procedure: EXTRACTION - STONE;  Surgeon: William Diaz MD;  Location: [facility] OR 1ST FLR;  Service: Urology;  Laterality: Right;    FLUOROSCOPY N/A 6/16/2021    Procedure: FLUOROSCOPY;  Surgeon: William Diaz MD;  Location: [facility] OR 2ND FLR;  Service: Urology;  Laterality: N/A;    GASTRECTOMY      INSERTION OF DIALYSIS CATHETER N/A 5/17/2019    Procedure: pleurx;  Surgeon: [facility];  Location: [facility] OR 2ND FLR;  Service: General;  Laterality: N/A;  Room 188/[facility]    KNEE ARTHROSCOPY      LAPAROSCOPIC CHOLECYSTECTOMY N/A 5/4/2020    Procedure: CHOLECYSTECTOMY, LAPAROSCOPIC;  Surgeon: SON Rowe MD;  Location: [facility] OR;  Service: General;  Laterality: N/A;    LASER LITHOTRIPSY N/A 6/30/2021    Procedure: LITHOTRIPSY, USING LASER;  Surgeon: William Diaz MD;  [facility];  Service: Urology;  Laterality: N/A;    LIVER BIOPSY N/A 5/4/2020    Procedure: BIOPSY, LIVER, Laproscopic ;  Surgeon: SON Rowe MD;  Location: [facility] OR;  Service: General;  Laterality: N/A;    perianal surgery      perianal cyst removed    PYELOSCOPY Right 6/30/2021    Procedure: PYELOSCOPY;  Surgeon: William Diaz MD;  [facility];  Service: Urology;  Laterality: Right;    PYELOSCOPY Right 10/16/2023    Procedure: PYELOSCOPY;  Surgeon: Chrystal Dias MD;  [facility];  Service: Urology;  Laterality: Right;    PYELOSCOPY Right 12/6/2023    Procedure: PYELOSCOPY;  Surgeon: William Diaz MD;  Location: [facility] OR [address] [place] [address];  Service:  Urology;  Laterality: Right;    REMOVAL-STENT Right 12/6/2023    Procedure: REMOVAL-STENT;  Surgeon: William Diaz MD;  [facility];  Service: Urology;  Laterality: Right;    REPLACEMENT OF STENT Right 6/30/2021    Procedure: REPLACEMENT, STENT;  Surgeon: William Diaz MD;  Location: [facility] OR 1ST FLR;  Service: Urology;  Laterality: Right;    RETROGRADE PYELOGRAPHY Right 10/16/2023    Procedure: PYELOGRAM, RETROGRADE;  Surgeon: Chrystal Dias MD;  Location: [facility] OR 1ST FLR;  Service: Urology;  Laterality: Right;    RETROGRADE PYELOGRAPHY Right 12/6/2023    Procedure: PYELOGRAM, RETROGRADE;  Surgeon: William Diaz MD;  [facility];  Service: Urology;  Laterality: Right;    TOE AMPUTATION Left 1/3/2025    Procedure: AMPUTATION, TOE;  Surgeon: Savanah Felton DPM;  Location: [facility];  Service: Podiatry;  Laterality: Left;    URETERAL STENT PLACEMENT Right 10/16/2023    Procedure: INSERTION, STENT, URETER;  Surgeon: Chrystal Dias MD;  [facility];  Service: Urology;  Laterality: Right;    URETEROSCOPY Right 6/30/2021    Procedure: URETEROSCOPY FLEXIBLE URETEROSCOPE;  Surgeon: William Diaz MD;  [facility];  Service: Urology;  Laterality: Right;    URETEROSCOPY Right 10/16/2023    Procedure: URETEROSCOPY;  Surgeon: Chrystal Dias MD;  [facility];  Service: Urology;  Laterality: Right;    URETEROSCOPY Right 12/6/2023    Procedure: URETEROSCOPY;  Surgeon: William Diaz MD;  [facility];  Service: Urology;  Laterality: Right;       Family History   Problem Relation Name Age of Onset    Cancer Mother      Cancer Father      Heart disease Father      Obesity Sister      Parkinsonism Brother      No Known Problems Paternal Grandmother      Cancer Paternal Grandfather      Cancer Brother      Diabetes Maternal Grandmother      Stroke Maternal Grandfather      Cirrhosis Neg Hx         Social History      Socioeconomic History    Marital status:    Tobacco Use    Smoking status: Former     Current packs/day: 0.00     Average packs/day: 2.0 packs/day for 30.0 years (60.0 ttl pk-yrs)     Types: Cigarettes     Start date: 1975     Quit date: 2005     Years since quittin.1    Smokeless tobacco: Never   Substance and Sexual Activity    Alcohol use: No     Comment: started ~, reports 1 shot daily, max 3 shots daily, vague about alcohol consumption. Last drink 2018    Drug use: No     Social Drivers of Health     Financial Resource Strain: Patient Declined (1/3/2025)    Overall Financial Resource Strain (CARDIA)     Difficulty of Paying Living Expenses: Patient declined   Food Insecurity: No Food Insecurity (2025)    Hunger Vital Sign     Worried About Running Out of Food in the Last Year: Never true     Ran Out of Food in the Last Year: Never true   Transportation Needs: No Transportation Needs (2025)    TRANSPORTATION NEEDS     Transportation : No   Physical Activity: Patient Declined (2024)    Exercise Vital Sign     Days of Exercise per Week: Patient declined     Minutes of Exercise per Session: Patient declined   Stress: Stress Concern Present (2025)    [employer] - Occupational Stress Questionnaire     Feeling of Stress : To some extent   Housing Stability: Unknown (2025)    Housing Stability Vital Sign     Unable to Pay for Housing in the Last Year: No     Homeless in the Last Year: No       MEDICATIONS & ALLERGIES:     Current Outpatient Medications on File Prior to Visit   Medication Sig    aspirin (ECOTRIN) 81 MG EC tablet Take 1 tablet (81 mg total) by mouth once daily.    blood pressure monitor Kit 1 kit by Misc.(Non-Drug; Combo Route) route 2 (two) times a day.    blood-glucose sensor (DEXCOM G6 SENSOR) Sandi Inject 1 each into the skin every 10 days.    blood-glucose transmitter (DEXCOM G6 TRANSMITTER) Sandi Inject 1 each into the  skin every 10 days.    calcitRIOL (ROCALTROL) 0.25 MCG Cap Take 0.25 mcg by mouth once daily.    clopidogreL (PLAVIX) 75 mg tablet Take 1 tablet (75 mg total) by mouth once daily.    glucagon (BAQSIMI) 3 mg/actuation Spry 1 each by Nasal route daily as needed (if glucose is less than 70 - can repeat in 1 hour if still low/less than 70).    HUMALOG KWIKPEN INSULIN 100 unit/mL pen Inject 5 Units into the skin before meals as needed (before each meal - if OFF of insulin pump). This is emergency back up insulin to use with meals if OFF of insulin pump    insulin detemir U-100, Levemir, 100 unit/mL (3 mL) SubQ InPn pen Inject 30 Units into the skin every evening. This is emergency back up insulin only if OFF of your insulin pump    insulin lispro-aabc (LYUMJEV U-100 INSULIN) 100 unit/mL Inject 80 Units into the skin continuous. Uses up to 80 units daily with omnipod 5 insulin pump as directed.    insulin pump cart,automated,BT (OMNIPOD 5 G6 PODS, GEN 5,) Crtg INJECT 1 EACH INTO THE SKIN EVERY OTHER DAY.    isosorbide mononitrate (IMDUR) 30 MG 24 hr tablet Take 1 tablet (30 mg total) by mouth once daily.    metoprolol succinate (TOPROL-XL) 50 MG 24 hr tablet Take 50 mg by mouth 2 (two) times daily.    oxyCODONE-acetaminophen (PERCOCET) 5-325 mg per tablet Take 1 tablet by mouth every 6 (six) hours as needed for Pain.    rosuvastatin (CRESTOR) 20 MG tablet Take 20 mg by mouth once daily.    torsemide (DEMADEX) 20 MG Tab Take 1 tablet (20 mg total) by mouth once daily.    acetaminophen (TYLENOL) 325 MG tablet Take 2 tablets (650 mg total) by mouth every 8 (eight) hours as needed for Pain. (Patient not taking: Reported on 3/17/2025)    allopurinoL (ZYLOPRIM) 100 MG tablet Take 100 mg by mouth.    ceFEPIme 1 gram injection Inject 1 g into the vein once daily. End 1/8/25 (Patient taking differently: Inject 1 g into the vein once daily. End 2/14/25)     No current facility-administered medications on file prior to visit.  "      Review of patient's allergies indicates:  No Known Allergies    OBJECTIVE:   Vital Signs:  Vitals:    03/17/25 1103   BP: 125/70   Pulse: 73   Temp: 97.1 °F (36.2 °C)   TempSrc: Oral   Weight: 109.8 kg (242 lb 1 oz)   Height: 5' 7" (1.702 m)       No results found for this or any previous visit (from the past 24 hours).      Physical Exam:   General:  Well developed, well nourished, no acute distress  HEENT:  Normocephalic, atraumatic, EOMI, clear sclera, throat clear without erythema or exudates  CVS:  RRR, S1 and S2 normal, no murmurs, rubs, gallops  Resp:  Lungs clear to auscultation, no wheezes, rales, rhonchi  GI:  Abdomen soft, non-tender, non-distended, normoactive bowel sounds, no masses  MSK:  2nd left toe amputation site healed  Skin:  skin cancer of left temple  Neuro:  CNII-XII grossly intact  Psych:  Alert and oriented to person, place, and time    ASSESSMENT:     1. Critical limb ischemia of left lower extremity  69-year-old male with a history of CKD 4, IDDM, and PAF who was recently admitted from Podiatry for left 2nd metatarsal amputation for gangrenous toe. He completed 6 weeks of therapy with cefepime and flagyl based on cultures    PLAN:     -no further need for antibiotics at this time  -monitor for future infection given risk factors  -discussed sign and symptoms of infection  -RTC PRN        "

## 2025-03-20 ENCOUNTER — LAB VISIT (OUTPATIENT)
Dept: LAB | Facility: HOSPITAL | Age: 70
End: 2025-03-20
Attending: INTERNAL MEDICINE
Payer: MEDICARE

## 2025-03-20 DIAGNOSIS — N18.4 CHRONIC KIDNEY DISEASE, STAGE 4 (SEVERE): ICD-10-CM

## 2025-03-20 DIAGNOSIS — E11.22 TYPE 2 DIABETES MELLITUS WITH STAGE 4 CHRONIC KIDNEY DISEASE, UNSPECIFIED WHETHER LONG TERM INSULIN USE: ICD-10-CM

## 2025-03-20 DIAGNOSIS — N18.4 TYPE 2 DIABETES MELLITUS WITH STAGE 4 CHRONIC KIDNEY DISEASE, WITH LONG-TERM CURRENT USE OF INSULIN: ICD-10-CM

## 2025-03-20 DIAGNOSIS — E11.22 TYPE 2 DIABETES MELLITUS WITH STAGE 4 CHRONIC KIDNEY DISEASE, WITH LONG-TERM CURRENT USE OF INSULIN: ICD-10-CM

## 2025-03-20 DIAGNOSIS — E11.69 HYPERLIPIDEMIA ASSOCIATED WITH TYPE 2 DIABETES MELLITUS: ICD-10-CM

## 2025-03-20 DIAGNOSIS — E78.5 HYPERLIPIDEMIA ASSOCIATED WITH TYPE 2 DIABETES MELLITUS: ICD-10-CM

## 2025-03-20 DIAGNOSIS — N18.4 TYPE 2 DIABETES MELLITUS WITH STAGE 4 CHRONIC KIDNEY DISEASE, UNSPECIFIED WHETHER LONG TERM INSULIN USE: ICD-10-CM

## 2025-03-20 DIAGNOSIS — I48.0 PAROXYSMAL ATRIAL FIBRILLATION: ICD-10-CM

## 2025-03-20 DIAGNOSIS — Z79.4 TYPE 2 DIABETES MELLITUS WITH STAGE 4 CHRONIC KIDNEY DISEASE, WITH LONG-TERM CURRENT USE OF INSULIN: ICD-10-CM

## 2025-03-20 LAB
BASOPHILS # BLD AUTO: 0.05 K/UL (ref 0–0.2)
BASOPHILS NFR BLD: 0.7 % (ref 0–1.9)
DIFFERENTIAL METHOD BLD: ABNORMAL
EOSINOPHIL # BLD AUTO: 0.1 K/UL (ref 0–0.5)
EOSINOPHIL NFR BLD: 1.9 % (ref 0–8)
ERYTHROCYTE [DISTWIDTH] IN BLOOD BY AUTOMATED COUNT: 13.5 % (ref 11.5–14.5)
HCT VFR BLD AUTO: 32.2 % (ref 40–54)
HGB BLD-MCNC: 10.6 G/DL (ref 14–18)
IMM GRANULOCYTES # BLD AUTO: 0.03 K/UL (ref 0–0.04)
IMM GRANULOCYTES NFR BLD AUTO: 0.4 % (ref 0–0.5)
LYMPHOCYTES # BLD AUTO: 1.6 K/UL (ref 1–4.8)
LYMPHOCYTES NFR BLD: 21.4 % (ref 18–48)
MCH RBC QN AUTO: 31 PG (ref 27–31)
MCHC RBC AUTO-ENTMCNC: 32.9 G/DL (ref 32–36)
MCV RBC AUTO: 94 FL (ref 82–98)
MONOCYTES # BLD AUTO: 0.4 K/UL (ref 0.3–1)
MONOCYTES NFR BLD: 5.9 % (ref 4–15)
NEUTROPHILS # BLD AUTO: 5.2 K/UL (ref 1.8–7.7)
NEUTROPHILS NFR BLD: 69.7 % (ref 38–73)
NRBC BLD-RTO: 0 /100 WBC
PLATELET # BLD AUTO: 126 K/UL (ref 150–450)
PMV BLD AUTO: 11.4 FL (ref 9.2–12.9)
RBC # BLD AUTO: 3.42 M/UL (ref 4.6–6.2)
WBC # BLD AUTO: 7.47 K/UL (ref 3.9–12.7)

## 2025-03-20 PROCEDURE — 85025 COMPLETE CBC W/AUTO DIFF WBC: CPT | Performed by: INTERNAL MEDICINE

## 2025-03-20 PROCEDURE — 84443 ASSAY THYROID STIM HORMONE: CPT | Performed by: INTERNAL MEDICINE

## 2025-03-20 PROCEDURE — 36415 COLL VENOUS BLD VENIPUNCTURE: CPT | Mod: PO | Performed by: INTERNAL MEDICINE

## 2025-03-20 PROCEDURE — 80053 COMPREHEN METABOLIC PANEL: CPT | Performed by: INTERNAL MEDICINE

## 2025-03-21 ENCOUNTER — TELEPHONE (OUTPATIENT)
Dept: INTERNAL MEDICINE | Facility: CLINIC | Age: 70
End: 2025-03-21
Payer: MEDICARE

## 2025-03-21 ENCOUNTER — RESULTS FOLLOW-UP (OUTPATIENT)
Dept: INTERNAL MEDICINE | Facility: CLINIC | Age: 70
End: 2025-03-21

## 2025-03-21 ENCOUNTER — PATIENT MESSAGE (OUTPATIENT)
Dept: NEPHROLOGY | Facility: CLINIC | Age: 70
End: 2025-03-21
Payer: MEDICARE

## 2025-03-21 LAB
ALBUMIN SERPL BCP-MCNC: 3.1 G/DL (ref 3.5–5.2)
ALP SERPL-CCNC: 250 U/L (ref 40–150)
ALT SERPL W/O P-5'-P-CCNC: 98 U/L (ref 10–44)
ANION GAP SERPL CALC-SCNC: 16 MMOL/L (ref 8–16)
ANION GAP SERPL CALC-SCNC: 16 MMOL/L (ref 8–16)
AST SERPL-CCNC: 92 U/L (ref 10–40)
BILIRUB SERPL-MCNC: 0.3 MG/DL (ref 0.1–1)
BUN SERPL-MCNC: 51 MG/DL (ref 8–23)
BUN SERPL-MCNC: 51 MG/DL (ref 8–23)
CALCIUM SERPL-MCNC: 7.9 MG/DL (ref 8.7–10.5)
CALCIUM SERPL-MCNC: 7.9 MG/DL (ref 8.7–10.5)
CHLORIDE SERPL-SCNC: 91 MMOL/L (ref 95–110)
CHLORIDE SERPL-SCNC: 91 MMOL/L (ref 95–110)
CO2 SERPL-SCNC: 22 MMOL/L (ref 23–29)
CO2 SERPL-SCNC: 22 MMOL/L (ref 23–29)
CREAT SERPL-MCNC: 4 MG/DL (ref 0.5–1.4)
CREAT SERPL-MCNC: 4 MG/DL (ref 0.5–1.4)
EST. GFR  (NO RACE VARIABLE): 15.3 ML/MIN/1.73 M^2
EST. GFR  (NO RACE VARIABLE): 15.3 ML/MIN/1.73 M^2
GLUCOSE SERPL-MCNC: 722 MG/DL (ref 70–110)
GLUCOSE SERPL-MCNC: 722 MG/DL (ref 70–110)
POTASSIUM SERPL-SCNC: 4.9 MMOL/L (ref 3.5–5.1)
POTASSIUM SERPL-SCNC: 4.9 MMOL/L (ref 3.5–5.1)
PROT SERPL-MCNC: 7.6 G/DL (ref 6–8.4)
SODIUM SERPL-SCNC: 129 MMOL/L (ref 136–145)
SODIUM SERPL-SCNC: 129 MMOL/L (ref 136–145)
TSH SERPL DL<=0.005 MIU/L-ACNC: 3.07 UIU/ML (ref 0.4–4)

## 2025-03-21 NOTE — TELEPHONE ENCOUNTER
Dr. VELIZ,    Spoke to patient, advised him of his lab results Advised patient to go to the ER immediately, patient declined and said, that's not gonna happen. I again advised it was critically necessary for him to go to the ER. Please let me know if you want me to call him back

## 2025-03-21 NOTE — TELEPHONE ENCOUNTER
----- Message from Leon Barajas DO sent at 3/21/2025 12:11 PM CDT -----  Call pt to inform him his blood sugar level is critically high. He needs to go the ED immediately   ----- Message -----  From: [facility]  Sent: 3/20/2025  11:16 PM CDT  To: Leon Barajas DO

## 2025-03-28 ENCOUNTER — DOCUMENT SCAN (OUTPATIENT)
Dept: HOME HEALTH SERVICES | Facility: HOSPITAL | Age: 70
End: 2025-03-28
Payer: MEDICARE

## 2025-03-31 DIAGNOSIS — E13.9 LADA (LATENT AUTOIMMUNE DIABETES IN ADULTS), MANAGED AS TYPE 1: ICD-10-CM

## 2025-04-01 RX ORDER — BLOOD-GLUCOSE SENSOR
1 EACH MISCELLANEOUS
Qty: 9 EACH | Refills: 3 | OUTPATIENT
Start: 2025-04-01

## 2025-04-01 NOTE — TELEPHONE ENCOUNTER
Refill Routing Note   Medication(s) are not appropriate for processing by [facility] for the following reason(s):        Responsible provider unclear    [facility] action(s):  Defer        Medication Therapy Plan: Acute care/admission documentation reviewed. No change in therapy    Extended chart review required: Yes     Appointments  past 12m or future 3m with PCP    Date Provider   Last Visit   5/29/2024 Leon Barajas, DO   Next Visit   Visit date not found Leon Barajas, DO   ED visits in past 90 days: 1        Note composed:12:29 PM 04/01/2025

## 2025-05-13 ENCOUNTER — LAB VISIT (OUTPATIENT)
Dept: LAB | Facility: HOSPITAL | Age: 70
End: 2025-05-13
Payer: MEDICARE

## 2025-05-13 DIAGNOSIS — Z79.4 ENCOUNTER FOR LONG-TERM (CURRENT) USE OF INSULIN: ICD-10-CM

## 2025-05-13 DIAGNOSIS — E11.51 TYPE II DIABETES MELLITUS WITH PERIPHERAL CIRCULATORY DISORDER: ICD-10-CM

## 2025-05-13 DIAGNOSIS — N18.5 CHRONIC KIDNEY DISEASE, STAGE V: Primary | ICD-10-CM

## 2025-05-13 LAB
ALBUMIN SERPL BCP-MCNC: 3 G/DL (ref 3.5–5.2)
ANION GAP (OHS): 12 MMOL/L (ref 8–16)
BUN SERPL-MCNC: 56 MG/DL (ref 8–23)
CALCIUM SERPL-MCNC: 8 MG/DL (ref 8.7–10.5)
CHLORIDE SERPL-SCNC: 103 MMOL/L (ref 95–110)
CO2 SERPL-SCNC: 23 MMOL/L (ref 23–29)
CREAT SERPL-MCNC: 3.8 MG/DL (ref 0.5–1.4)
EAG (OHS): 229 MG/DL (ref 68–131)
GFR SERPLBLD CREATININE-BSD FMLA CKD-EPI: 16 ML/MIN/1.73/M2
GLUCOSE SERPL-MCNC: 131 MG/DL (ref 70–110)
HBA1C MFR BLD: 9.6 % (ref 4–5.6)
PHOSPHATE SERPL-MCNC: 6.6 MG/DL (ref 2.7–4.5)
POTASSIUM SERPL-SCNC: 4.1 MMOL/L (ref 3.5–5.1)
SODIUM SERPL-SCNC: 138 MMOL/L (ref 136–145)

## 2025-05-13 PROCEDURE — 83036 HEMOGLOBIN GLYCOSYLATED A1C: CPT

## 2025-05-13 PROCEDURE — 84520 ASSAY OF UREA NITROGEN: CPT

## 2025-05-15 ENCOUNTER — OFFICE VISIT (OUTPATIENT)
Dept: PODIATRY | Facility: CLINIC | Age: 70
End: 2025-05-15
Payer: MEDICARE

## 2025-05-15 ENCOUNTER — TELEPHONE (OUTPATIENT)
Dept: PODIATRY | Facility: CLINIC | Age: 70
End: 2025-05-15
Payer: MEDICARE

## 2025-05-15 VITALS
DIASTOLIC BLOOD PRESSURE: 55 MMHG | SYSTOLIC BLOOD PRESSURE: 83 MMHG | WEIGHT: 242.06 LBS | HEIGHT: 67 IN | BODY MASS INDEX: 37.99 KG/M2 | HEART RATE: 68 BPM

## 2025-05-15 DIAGNOSIS — S90.229A SUBUNGUAL HEMATOMA OF TOE, UNSPECIFIED LATERALITY, INITIAL ENCOUNTER: Primary | ICD-10-CM

## 2025-05-15 DIAGNOSIS — I73.9 PVD (PERIPHERAL VASCULAR DISEASE): ICD-10-CM

## 2025-05-15 DIAGNOSIS — B35.1 ONYCHOMYCOSIS DUE TO DERMATOPHYTE: ICD-10-CM

## 2025-05-15 PROCEDURE — 99999 PR PBB SHADOW E&M-EST. PATIENT-LVL III: CPT | Mod: PBBFAC,,, | Performed by: STUDENT IN AN ORGANIZED HEALTH CARE EDUCATION/TRAINING PROGRAM

## 2025-05-15 NOTE — PROGRESS NOTES
Subjective:     Patient ID: Jian Arrieta is a 69 y.o. male.    Chief Complaint: Diabetic Foot Exam (L3 toenail discolored)    Jian is a 69 y.o. male who presents to the clinic for evaluation and treatment of high risk feet. Jian has a past medical history of Alcohol abuse, Anasarca (1/28/2019), Anemia, Anticoagulant long-term use, Arthropathy associated with neurological disorder (9/2/2015), Atherosclerosis, Charcot foot due to diabetes mellitus, Chronic combined systolic and diastolic heart failure (01/29/2019), Chronic pancreatitis (1/28/2019), CKD (chronic kidney disease) stage 3, GFR 30-59 ml/min, CKD (chronic kidney disease) stage 4, GFR 15-29 ml/min, Colon polyps, Coronary artery disease due to calcified coronary lesion (05/08/2015), Diabetic polyneuropathy associated with type 2 diabetes mellitus (9/2/2015), Diverticulosis (1/28/2019), DM type 2 with diabetic peripheral neuropathy (2/4/2019), Encounter for blood transfusion, Essential hypertension (1/28/2019), Former smoker (8/26/2015), Healed ulcer of left foot on examination (6/20/2017), Hematuria, Hydrocele, Hyperphosphatemia, Hypoalbuminemia (2/4/2019), Hypocalcemia, Lymphedema of both lower extremities (1/29/2019), Mixed hyperlipidemia (5/8/2015), Morbid obesity with BMI of 50.0-59.9, adult (5/8/2015), Obstruction of right ureteropelvic junction (UPJ) due to stone (5/24/2021), Onychomycosis of multiple toenails with type 2 diabetes mellitus and peripheral neuropathy (6/20/2017), Perianal cyst, Proteinuria, Pseudocyst of pancreas (1/28/2019), Skin cancer, Sleep apnea (8/26/2015), Status post bariatric surgery (1/11/2016), Type 2 diabetes mellitus, with long-term current use of insulin (5/8/2015), and Urinary tract infection. The patient's chief complaint is foot ulcer, left 2nd toe with dry gangrene. This patient has documented high risk feet requiring routine maintenance secondary to diabetes mellitis and those secondary complications of diabetes, as  mentioned..    3/6/25: Seen today for annual diabetic foot exam. S/p left 2nd digit amputation, site is well healed.     5/15/25: Seen today for new concern of darkened left 3rd toenail. He is concerned about his circulation. Relates he will be getting dialysis soon. No further pedal complaints.     PCP: Reza Boyer MD      Sign of Infection: none    Hemoglobin A1C   Date Value Ref Range Status   01/04/2025 9.7 (H) 4.0 - 5.6 % Final     Comment:     ADA Screening Guidelines:  5.7-6.4%  Consistent with prediabetes  >or=6.5%  Consistent with diabetes    High levels of fetal hemoglobin interfere with the HbA1C  assay. Heterozygous hemoglobin variants (HbS, HgC, etc)do  not significantly interfere with this assay.   However, presence of multiple variants may affect accuracy.     11/15/2024 7.9 (H) 4.0 - 5.6 % Final     Comment:     ADA Screening Guidelines:  5.7-6.4%  Consistent with prediabetes  >or=6.5%  Consistent with diabetes    High levels of fetal hemoglobin interfere with the HbA1C  assay. Heterozygous hemoglobin variants (HbS, HgC, etc)do  not significantly interfere with this assay.   However, presence of multiple variants may affect accuracy.     05/22/2024 7.2 (H) 4.0 - 5.6 % Final     Comment:     ADA Screening Guidelines:  5.7-6.4%  Consistent with prediabetes  >or=6.5%  Consistent with diabetes    High levels of fetal hemoglobin interfere with the HbA1C  assay. Heterozygous hemoglobin variants (HbS, HgC, etc)do  not significantly interfere with this assay.   However, presence of multiple variants may affect accuracy.       Hemoglobin A1c   Date Value Ref Range Status   05/13/2025 9.6 (H) 4.0 - 5.6 % Final     Comment:     ADA Screening Guidelines:  5.7-6.4%  Consistent with prediabetes  >=6.5%  Consistent with diabetes    High levels of fetal hemoglobin interfere with the HbA1C  assay. Heterozygous hemoglobin variants (HbS, HgC, etc)do  not significantly interfere with this assay.   However,  presence of multiple variants may affect accuracy.       Review of Systems   Constitutional: Negative for chills, decreased appetite, diaphoresis and fever.   HENT:  Negative for congestion and hearing loss.    Cardiovascular:  Negative for chest pain, claudication and syncope.   Respiratory:  Negative for cough and shortness of breath.    Skin:  Positive for color change, dry skin, nail changes and poor wound healing. Negative for flushing, itching and rash.   Musculoskeletal:  Negative for back pain.   Gastrointestinal:  Negative for nausea and vomiting.   Neurological:  Positive for numbness and paresthesias. Negative for focal weakness and weakness.   Psychiatric/Behavioral:  Negative for altered mental status. The patient is not nervous/anxious.         Objective:     Physical Exam  Constitutional:       General: He is not in acute distress.     Appearance: Normal appearance. He is well-developed. He is not diaphoretic.   Cardiovascular:      Comments: Dorsalis pedis and posterior tibial pulses are mildly diminished. Skin temperature is within normal limits. Toes are cool to touch and feet are warm proximally. Hair growth is diminished. Skin is mildly atrophic and with mild hyperpigmentation.lymphedema noted, bilaterally.   Musculoskeletal:         General: No tenderness.      Comments: Adequate joint range of motion without pain, limitation, nor crepitation to foot and ankle joints. Muscle strength is 5/5 in all groups bilaterally.       Feet:      Right foot:      Skin integrity: Dry skin present. No ulcer, blister, erythema or warmth.      Left foot:      Skin integrity: Dry skin present. No ulcer, blister, erythema or warmth.   Skin:     General: Skin is warm and dry.      Capillary Refill: Capillary refill takes less than 2 seconds.      Comments: Skin is warm and dry, no acute signs of infection noted bilaterally      Toenails are well trimmed and of normal morphology    Left 3rd digit with dry, stable  subungual hematoma    See wound description below    Otherwise, no open wounds, macerations or hyperkeratotic lesions, bilaterally            Neurological:      Mental Status: He is alert and oriented to person, place, and time.      Sensory: Sensory deficit present.      Motor: No abnormal muscle tone.      Comments: Light touch within normal limits. [place] 5.07 monofilamant testing is diminished. Vibratory sensation is diminished bilaterally.   Psychiatric:         Mood and Affect: Mood normal.         Behavior: Behavior normal.         Thought Content: Thought content normal.       S/p left 2nd digit amputation, ALL WOUNDS ARE HEALED.     PREVIOUS IMAGES:    Assessment:      Encounter Diagnoses   Name Primary?    Subungual hematoma of toe, unspecified laterality, initial encounter Yes    PVD (peripheral vascular disease)     Onychomycosis due to dermatophyte          Plan:     Jian was seen today for diabetic foot exam.    Diagnoses and all orders for this visit:    Subungual hematoma of toe, unspecified laterality, initial encounter    PVD (peripheral vascular disease)  -     Routine Foot Care    Onychomycosis due to dermatophyte  -     Routine Foot Care          I counseled the patient on his conditions, their implications and medical management.  Discussed left 3rd toenail is consistent with subungual hematoma, appears dry and stable, discussed it should grow out with toenail  Daily and frequent foot checks  Recommend follow up with Cardiology for history of PAD, patient states his has upcoming appointment scheduled  Return to clinic in 2-4 weeks or PRN as discussed.

## 2025-05-15 NOTE — PROCEDURES
"Routine Foot Care    Date/Time: 5/15/2025 1:00 PM    Performed by: Savanah Felton DPM  Authorized by: Savanah Felton DPM    Time out: Immediately prior to procedure a "time out" was called to verify the correct patient, procedure, equipment, support staff and site/side marked as required.    Consent Done?:  Yes (Verbal)  Hyperkeratotic Skin Lesions?: No      Nail Care Type:  Debride(Left 1st Toe, Left 3rd Toe, Left 4th Toe, Left 5th Toe, Right 1st Toe, Right 2nd Toe, Right 3rd Toe, Right 4th Toe and Right 5th Toe)  Patient tolerance:  Patient tolerated the procedure well with no immediate complications    "

## 2025-05-15 NOTE — TELEPHONE ENCOUNTER
Called pt back, told me his toenail turned black about 1 week ago and his wife told him to go to get it checked out. Sched his for this afternoon at 1:00 pm

## 2025-05-15 NOTE — TELEPHONE ENCOUNTER
----- Message from Jess sent at 5/15/2025 10:34 AM CDT -----  .Type:  Needs Medical AdviceWho Called: ptWould the patient rather a call back or a response via [facility]? Call [facility] Call Back Number: [phone] Information: Pt stated he would like a call back regarding a black toe nail on his left foot 3rd toe

## 2025-06-06 ENCOUNTER — TELEPHONE (OUTPATIENT)
Dept: UROLOGY | Facility: CLINIC | Age: 70
End: 2025-06-06
Payer: MEDICARE

## 2025-06-06 DIAGNOSIS — N22 CALCULUS OF URINARY TRACT IN DISEASES CLASSIFIED ELSEWHERE: Primary | ICD-10-CM

## 2025-06-06 NOTE — TELEPHONE ENCOUNTER
Spoke to pt , he is asking for lab work today for back pain. I advised him that he was last seen march 2024 and was advised to follow up in march 2025 with a renal stone study and that I could only schedule that. Pt states that he was eating and was going to head to the ER, but then thought he would he would call dr min. I offered an tatyana't on Tuesday 6/10-but  he was a little upset that I could not get him an tatyana't today. Pt declined ctsan and appt' and said he would probably just go to the ER so they could just do a bunch of test today to see what is going on.

## 2025-06-09 ENCOUNTER — TELEPHONE (OUTPATIENT)
Dept: UROLOGY | Facility: CLINIC | Age: 70
End: 2025-06-09
Payer: MEDICARE

## 2025-06-09 ENCOUNTER — OFFICE VISIT (OUTPATIENT)
Dept: UROLOGY | Facility: CLINIC | Age: 70
End: 2025-06-09
Payer: MEDICARE

## 2025-06-09 VITALS
HEART RATE: 80 BPM | SYSTOLIC BLOOD PRESSURE: 136 MMHG | BODY MASS INDEX: 40.05 KG/M2 | WEIGHT: 255.75 LBS | DIASTOLIC BLOOD PRESSURE: 80 MMHG

## 2025-06-09 DIAGNOSIS — N40.1 BPH WITH LOWER URINARY TRACT SYMPTOMS WITHOUT URINARY OBSTRUCTION: ICD-10-CM

## 2025-06-09 DIAGNOSIS — R31.29 HEMATURIA, MICROSCOPIC: Primary | ICD-10-CM

## 2025-06-09 DIAGNOSIS — N20.1 URETEROLITHIASIS: ICD-10-CM

## 2025-06-09 PROCEDURE — 3288F FALL RISK ASSESSMENT DOCD: CPT | Mod: CPTII,S$GLB,, | Performed by: UROLOGY

## 2025-06-09 PROCEDURE — 99214 OFFICE O/P EST MOD 30 MIN: CPT | Mod: 57,S$GLB,, | Performed by: UROLOGY

## 2025-06-09 PROCEDURE — 99999 PR PBB SHADOW E&M-EST. PATIENT-LVL IV: CPT | Mod: PBBFAC,,, | Performed by: UROLOGY

## 2025-06-09 PROCEDURE — 87086 URINE CULTURE/COLONY COUNT: CPT | Performed by: UROLOGY

## 2025-06-09 PROCEDURE — 3079F DIAST BP 80-89 MM HG: CPT | Mod: CPTII,S$GLB,, | Performed by: UROLOGY

## 2025-06-09 PROCEDURE — 3075F SYST BP GE 130 - 139MM HG: CPT | Mod: CPTII,S$GLB,, | Performed by: UROLOGY

## 2025-06-09 PROCEDURE — 1101F PT FALLS ASSESS-DOCD LE1/YR: CPT | Mod: CPTII,S$GLB,, | Performed by: UROLOGY

## 2025-06-09 PROCEDURE — 3046F HEMOGLOBIN A1C LEVEL >9.0%: CPT | Mod: CPTII,S$GLB,, | Performed by: UROLOGY

## 2025-06-09 PROCEDURE — 3008F BODY MASS INDEX DOCD: CPT | Mod: CPTII,S$GLB,, | Performed by: UROLOGY

## 2025-06-09 PROCEDURE — 1126F AMNT PAIN NOTED NONE PRSNT: CPT | Mod: CPTII,S$GLB,, | Performed by: UROLOGY

## 2025-06-09 PROCEDURE — 1159F MED LIST DOCD IN RCRD: CPT | Mod: CPTII,S$GLB,, | Performed by: UROLOGY

## 2025-06-09 RX ORDER — CIPROFLOXACIN 500 MG/1
500 TABLET, FILM COATED ORAL
Status: SHIPPED | OUTPATIENT
Start: 2025-06-09

## 2025-06-09 RX ORDER — CEFTRIAXONE 1 G/1
1 INJECTION, POWDER, FOR SOLUTION INTRAMUSCULAR; INTRAVENOUS ONCE
Qty: 1 G | Refills: 0 | Status: SHIPPED | OUTPATIENT
Start: 2025-06-09 | End: 2025-06-09

## 2025-06-09 NOTE — H&P (VIEW-ONLY)
"      CHIEF COMPLAINT:    Mr. Arrieta is a 69 y.o. male presenting to discuss his upcoming surgery.  Hospitalization discharge follow up.  PRESENTING ILLNESS:    Jian Arrieta is a 69 y.o. male who presents for evaluation of right flank pain.    PMHx MARIE (liver bx 3/2019 w/o cirrhosis), hx of sleeve gastrectomy, CAD s/p CABG, and CKD3 w/ hx of obstructive nephrolithiasis who presents to the ED from nephrology clinic for worsening renal function for last 2 weeks. Pt. Baseline Cr ~2.2, but he recently presented to [facility] ED with B/L flank pain. Cr wasmildly elevated at 2.56 and CT renal stone revealed "mild fullness of the proximal R renal collecting system without evidence of obstructing stone and surrounding perinephric stranding." Pt. Was discharged from the ED with PO doxycycline to treat possible pyelonephritis although U/A was LE negative and not sent for culture. he obtained nephrology f/u yesterday, and on repeat labs Cr noted to be 4.7 and K+ 5.2., so he was referred to the ED. He reports some persistent flank pain, but it has improved. He denies any fevers, chills, nausea, or vomiting. No reported changes in urine output, dysuria, or hematuria. On ED arrival, pt. Noted to have  and pt. Reports that his omnipod Dash - Insulin pump must be malfunctioning, although he is not sure when it stopped working. Labs 3 days ago show normal BG.  10/13- admitted for uncontrollled diabetes, acute renal failure, obstructive nephropathy. Recently treated for pyelo w doxycycline. /79   Pulse 97  . Cr 4.9-> 4.5 BL 2.1.  -> 340. Received NS. UO- 300 cc.  One BM this am.  Nurse reports leg hot and red, photos and put in file - exam consistent w stasis dermatitis. AF. Started empiric clindamycin, this am, and discontinued.  continue NS x one liter.  Urology to place stent in am.   Cxr - mild pulm edema  CT abd - Mild right hydronephrosis and right hydroureter with gradual tapering without evidence for ureteral " calculus, these findings may relate to sequelae of recently passed calculus, clinical and historical correlation is needed.  Mild perinephric stranding bilaterally, nonspecific however correlation for UTI/pyelonephritis is needed.  Mild urinary bladder wall thickening may relate to incomplete distention however correlation for UTI/cystitis is needed.  Mild circumferential thickening of the distal descending colon/proximal sigmoid colon with mild pericolonic haziness, correlation for mild colitis to include mild diverticulitis is needed.  Right adrenal nodule likely representing adrenal adenoma.    The patient presents today for a one year follow up. Of note he presented to the  ED on 6/6 due to left flank pain. He was found to have a proximal ureteral stone and an ELIEZER.     10/14 - appreciate Urology f/u - no need for stent because renal function improving. Renal US showing mild right hydronephrosis. Cr 4.5-> 3.7  baseline around 2.2. Continuing IVFs.  follow up with Urology- Dr. Diaz outpatient for MAG3. Resume diabetic diet. Endocrine following.  BS 92 this am.   10/15 - Cr 3.8 from 3.9 from 4.5, baseline around 2.2.  NPO at midnight for possible stent tomorrow. Appreciate Urology f/u.   10/16 -  lab cr 3.5.  NPO.   10/17 - stent placed successfully yesterday. Eating. Cr 3.1 continues to improve. Will dc to home today. F/u Urology and int med.   6/6 - The patient presented to  for left flank pain and found to have a left ureteral stone      Because of right hydronephrosis and ELIEZER. Pt underwent urgent urologic surgery on 10/16/23.  Procedure: 10/16/23  1.  Cystoscopy with right retrograde pyelogram  2.  Fluoroscopy < 1 hour  3.  Intra-operative interpretation of radiographic images  4.  Right ureteroscopy  5.  Right pyeloscopy   6.  Right ureteral stent placement  Operative Findings:   Right retrograde pyelogram with questionable filling defect at right UPJ  Right ureteroscopy with radiolucent stone identified at  right UPJ  Right pyeloscopy revealed turbid urine and additional radiolucent stone burden in the right kidney  Right ureteral stent placed with good coils on fluoroscopy and direct vision in the bladder    Stone Analysis  80% Uric acid   20% Calcium oxalate monohydrate     He had bilateral hydrocelectomy on 6/16/21.  He saw Dr. Diaz on 6/21/21 and was noted to have a post op hematoma.  He has not started urocit k due to decreased kidney function.  Tried viagra in the past and it worked well for him.    PATIENT HISTORY:    Past Medical History:   Diagnosis Date    Alcohol abuse     Anasarca 1/28/2019    Anemia     Anticoagulant long-term use     Arthropathy associated with neurological disorder 9/2/2015    Atherosclerosis     Charcot foot due to diabetes mellitus     Chronic combined systolic and diastolic heart failure 01/29/2019 1-28-19 Left VentricleModerate decreased ejection fraction at 30%. Normal left ventricular cavity size. Normal wall thickness observed. Grade I (mild) left ventricular diastolic dysfunction consistent with impaired relaxation. Normal left atrial pressure. Moderate, global hypokinesis(see wall scoring diagram). Right VentricleNormal cavity size, wall thickness and ejection fraction. Wall motion n    Chronic pancreatitis 1/28/2019    CKD (chronic kidney disease) stage 3, GFR 30-59 ml/min     CKD (chronic kidney disease) stage 4, GFR 15-29 ml/min     Colon polyps     approx 5 yrs ago    Coronary artery disease due to calcified coronary lesion 05/08/2015    5 stents on ASA      Diabetic polyneuropathy associated with type 2 diabetes mellitus 9/2/2015    Diverticulosis 1/28/2019    DM type 2 with diabetic peripheral neuropathy 2/4/2019    Encounter for blood transfusion     Essential hypertension 1/28/2019    Former smoker 8/26/2015    Healed ulcer of left foot on examination 6/20/2017    Hematuria     Hydrocele     approx 1.5 yrs ago    Hyperphosphatemia     Hypoalbuminemia 2/4/2019     Hypocalcemia     Lymphedema of both lower extremities 1/29/2019    Mixed hyperlipidemia 5/8/2015    Morbid obesity with BMI of 50.0-59.9, adult 5/8/2015    Obstruction of right ureteropelvic junction (UPJ) due to stone 5/24/2021    Onychomycosis of multiple toenails with type 2 diabetes mellitus and peripheral neuropathy 6/20/2017    Perianal cyst     approx 2 yrs ago    Proteinuria     Pseudocyst of pancreas 1/28/2019 1-28-19 Liver has a cirrhotic morphology with no focal lesions.  Significant interval increase in ascites when compared to prior exam which may account for patient's abdominal distension.  Hypodense air-fluid collection along the body of the pancreas which is slightly smaller when compared to prior CT.  Findings may relate to pancreatic necrosis with pancreatic pseudocysts with infected pseudocyst    Skin cancer     skin cancer    Sleep apnea 8/26/2015    Status post bariatric surgery 1/11/2016    Type 2 diabetes mellitus, with long-term current use of insulin 5/8/2015    Urinary tract infection        Past Surgical History:   Procedure Laterality Date    ANGIOGRAPHY N/A 6/28/2019    Procedure: ANGIOGRAM-PV STENT;  Surgeon: Ewa Diagnostic Provider;  Location: [facility] OR;  Service: Radiology;  Laterality: N/A;    ANGIOPLASTY      total x5 stents    AORTOGRAPHY WITH SERIALOGRAPHY N/A 11/26/2024    Procedure: AORTOGRAM, WITH SERIALOGRAPHY;  Surgeon: Clinton Gibbs MD;  Location: [facility] CATH LAB/EP;  Service: Cardiology;  Laterality: N/A;    COLONOSCOPY N/A 10/6/2015    Procedure: COLONOSCOPY;  Surgeon: Shekhar Richards MD;  Location: [facility] ENDO (2ND FLR);  Service: Endoscopy;  Laterality: N/A;  BMI over 55/2nd floor case    5 day hold Plavix, Dr Kwadwo Arroyo    COLONOSCOPY N/A 5/13/2021    Procedure: COLONOSCOPY;  Surgeon: Huan Brumfield MD;  Location: [facility] ENDO;  Service: Endoscopy;  Laterality: N/A;    CORONARY ANGIOGRAPHY Right 3/20/2019    Procedure: ANGIOGRAM, CORONARY ARTERY;  Surgeon: Bob KHAN  MD Neto;  Location: [facility] CATH LAB;  Service: Cardiology;  Laterality: Right;    CORONARY ARTERY BYPASS GRAFT  2017    x3    CORONARY BYPASS GRAFT ANGIOGRAPHY  3/20/2019    Procedure: Bypass graft study;  Surgeon: Bob Duque MD;  Location: [facility] CATH LAB;  Service: Cardiology;;    CYST REMOVAL      CYSTOSCOPY Right 6/30/2021    Procedure: CYSTOSCOPY;  Surgeon: William Diaz MD;  Location: [facility] OR [facility] FLR;  Service: Urology;  Laterality: Right;    CYSTOSCOPY N/A 10/16/2023    Procedure: CYSTOSCOPY;  Surgeon: Chrystal Dias MD;  [facility];  Service: Urology;  Laterality: N/A;    CYSTOSCOPY N/A 12/6/2023    Procedure: CYSTOSCOPY;  Surgeon: William Diaz MD;  [facility];  Service: Urology;  Laterality: N/A;    CYSTOSCOPY W/ URETERAL STENT PLACEMENT Right 6/16/2021    Procedure: CYSTOSCOPY, WITH URETERAL STENT INSERTION;  Surgeon: William Diaz MD;  [facility];  Service: Urology;  Laterality: Right;  FLUORO LESS THAN 1 HOUR    ENDOSCOPIC ULTRASOUND OF UPPER GASTROINTESTINAL TRACT N/A 2/26/2020    Procedure: ULTRASOUND, UPPER GI TRACT, ENDOSCOPIC;  Surgeon: Robbie Yang MD;  Location: [facility];  Service: Endoscopy;  Laterality: N/A;    ESOPHAGOGASTRODUODENOSCOPY N/A 7/8/2019    Procedure: ESOPHAGOGASTRODUODENOSCOPY (EGD);  Surgeon: Huan Brumfield MD;  Location: [facility];  Service: Endoscopy;  Laterality: N/A;    ESOPHAGOGASTRODUODENOSCOPY N/A 5/13/2021    Procedure: EGD (ESOPHAGOGASTRODUODENOSCOPY);  Surgeon: Huan Brumfield MD;  Location: [facility];  Service: Endoscopy;  Laterality: N/A;    EXCISION OF HYDROCELE Bilateral 6/16/2021    Procedure: HYDROCELE REPAIR;  Surgeon: William Diaz MD;  Location: [facility] OR 2ND FLR;  Service: Urology;  Laterality: Bilateral;  2 HOURS    EXTRACTION - STONE Right 12/6/2023    Procedure: EXTRACTION - STONE;  Surgeon: William Diaz MD;  Location: [facility] OR [address] [place] [address];  Service: Urology;  Laterality: Right;     FLUOROSCOPY N/A 6/16/2021    Procedure: FLUOROSCOPY;  Surgeon: William Diaz MD;  Location: [facility] OR 2ND FLR;  Service: Urology;  Laterality: N/A;    GASTRECTOMY      INSERTION OF DIALYSIS CATHETER N/A 5/17/2019    Procedure: pleurx;  Surgeon: Ewa Diagnostic Provider;  Location: [facility] OR 2ND FLR;  Service: General;  Laterality: N/A;  Room 188/[place]    KNEE ARTHROSCOPY      LAPAROSCOPIC CHOLECYSTECTOMY N/A 5/4/2020    Procedure: CHOLECYSTECTOMY, LAPAROSCOPIC;  Surgeon: SON Rowe MD;  Location: [facility] OR;  Service: General;  Laterality: N/A;    LASER LITHOTRIPSY N/A 6/30/2021    Procedure: LITHOTRIPSY, USING LASER;  Surgeon: William Diaz MD;  Location: [facility] OR 1ST FLR;  Service: Urology;  Laterality: N/A;    LIVER BIOPSY N/A 5/4/2020    Procedure: BIOPSY, LIVER, Laproscopic ;  Surgeon: SON Rowe MD;  Location: [facility] OR;  Service: General;  Laterality: N/A;    perianal surgery      perianal cyst removed    PYELOSCOPY Right 6/30/2021    Procedure: PYELOSCOPY;  Surgeon: William Diaz MD;  Location: [facility] OR 1ST FLR;  Service: Urology;  Laterality: Right;    PYELOSCOPY Right 10/16/2023    Procedure: PYELOSCOPY;  Surgeon: Chrystal Dias MD;  Location: [facility] OR 1ST FLR;  Service: Urology;  Laterality: Right;    PYELOSCOPY Right 12/6/2023    Procedure: PYELOSCOPY;  Surgeon: William Diaz MD;  Location: [facility] OR 1ST FLR;  Service: Urology;  Laterality: Right;    REMOVAL-STENT Right 12/6/2023    Procedure: REMOVAL-STENT;  Surgeon: William Diaz MD;  Location: [facility] OR 1ST FLR;  Service: Urology;  Laterality: Right;    REPLACEMENT OF STENT Right 6/30/2021    Procedure: REPLACEMENT, STENT;  Surgeon: William Diaz MD;  Location: [facility] OR 1ST FLR;  Service: Urology;  Laterality: Right;    RETROGRADE PYELOGRAPHY Right 10/16/2023    Procedure: PYELOGRAM, RETROGRADE;  Surgeon: Chrystal Dias MD;  Location: [facility] OR [address] [place] [address];  Service: Urology;  Laterality: Right;    RETROGRADE PYELOGRAPHY Right  2023    Procedure: PYELOGRAM, RETROGRADE;  Surgeon: William Diaz MD;  Location: [facility] OR [facility] FLR;  Service: Urology;  Laterality: Right;    TOE AMPUTATION Left 1/3/2025    Procedure: AMPUTATION, TOE;  Surgeon: Savanah Felton DPM;  Location: [facility] OR;  Service: Podiatry;  Laterality: Left;    URETERAL STENT PLACEMENT Right 10/16/2023    Procedure: INSERTION, STENT, URETER;  Surgeon: Chrystal Dias MD;  [facility];  Service: Urology;  Laterality: Right;    URETEROSCOPY Right 2021    Procedure: URETEROSCOPY FLEXIBLE URETEROSCOPE;  Surgeon: William Diaz MD;  [facility];  Service: Urology;  Laterality: Right;    URETEROSCOPY Right 10/16/2023    Procedure: URETEROSCOPY;  Surgeon: Chrystal Dias MD;  [facility];  Service: Urology;  Laterality: Right;    URETEROSCOPY Right 2023    Procedure: URETEROSCOPY;  Surgeon: William Diaz MD;  [facility];  Service: Urology;  Laterality: Right;       Family History   Problem Relation Name Age of Onset    Cancer Mother      Cancer Father      Heart disease Father      Obesity Sister      Parkinsonism Brother      No Known Problems Paternal Grandmother      Cancer Paternal Grandfather      Cancer Brother      Diabetes Maternal Grandmother      Stroke Maternal Grandfather      Cirrhosis Neg Hx         Social History     Socioeconomic History    Marital status:    Tobacco Use    Smoking status: Former     Current packs/day: 0.00     Average packs/day: 2.0 packs/day for 30.0 years (60.0 ttl pk-yrs)     Types: Cigarettes     Start date: 1975     Quit date: 2005     Years since quittin.3    Smokeless tobacco: Never   Substance and Sexual Activity    Alcohol use: No     Comment: started ~, reports 1 shot daily, max 3 shots daily, vague about alcohol consumption. Last drink 2018    Drug use: No     Social Drivers of Health     Financial Resource Strain: Patient  Declined (1/3/2025)    Overall Financial Resource Strain (CARDIA)     Difficulty of Paying Living Expenses: Patient declined   Food Insecurity: No Food Insecurity (1/6/2025)    Hunger Vital Sign     Worried About Running Out of Food in the Last Year: Never true     Ran Out of Food in the Last Year: Never true   Transportation Needs: No Transportation Needs (1/6/2025)    TRANSPORTATION NEEDS     Transportation : No   Physical Activity: Patient Declined (11/27/2024)    Exercise Vital Sign     Days of Exercise per Week: Patient declined     Minutes of Exercise per Session: Patient declined   Stress: Stress Concern Present (1/6/2025)    [employer] [place] - Occupational Stress Questionnaire     Feeling of Stress : To some extent   Housing Stability: Unknown (1/6/2025)    Housing Stability Vital Sign     Unable to Pay for Housing in the Last Year: No     Homeless in the Last Year: No       Allergies:  Patient has no known allergies.    Medications:    Current Outpatient Medications:     acetaminophen (TYLENOL) 325 MG tablet, Take 2 tablets (650 mg total) by mouth every 8 (eight) hours as needed for Pain., Disp: 30 tablet, Rfl: 0    allopurinoL (ZYLOPRIM) 100 MG tablet, Take 100 mg by mouth., Disp: , Rfl:     aspirin (ECOTRIN) 81 MG EC tablet, Take 1 tablet (81 mg total) by mouth once daily., Disp: 90 tablet, Rfl: 3    blood pressure monitor Kit, 1 kit by Misc.(Non-Drug; Combo Route) route 2 (two) times a day., Disp: 1 each, Rfl: 0    blood-glucose sensor (DEXCOM G6 SENSOR) Sandi, Inject 1 each into the skin every 10 days., Disp: 9 each, Rfl: 3    blood-glucose transmitter (DEXCOM G6 TRANSMITTER) Sandi, Inject 1 each into the skin every 10 days., Disp: 1 each, Rfl: 3    calcitRIOL (ROCALTROL) 0.25 MCG Cap, Take 0.25 mcg by mouth once daily., Disp: , Rfl:     ceFEPIme 1 gram injection, Inject 1 g into the vein once daily. End 1/8/25, Disp: , Rfl:     cefTRIAXone (ROCEPHIN) 1 gram injection, Inject 1 g  into the muscle once. for 1 dose, Disp: 1 g, Rfl: 0    clopidogreL (PLAVIX) 75 mg tablet, Take 1 tablet (75 mg total) by mouth once daily., Disp: 90 tablet, Rfl: 3    glucagon (BAQSIMI) 3 mg/actuation Spry, 1 each by Nasal route daily as needed (if glucose is less than 70 - can repeat in 1 hour if still low/less than 70)., Disp: 2 each, Rfl: 3    HUMALOG KWIKPEN INSULIN 100 unit/mL pen, Inject 5 Units into the skin before meals as needed (before each meal - if OFF of insulin pump). This is emergency back up insulin to use with meals if OFF of insulin pump, Disp: 15 mL, Rfl: 3    insulin detemir U-100, Levemir, 100 unit/mL (3 mL) SubQ InPn pen, Inject 30 Units into the skin every evening. This is emergency back up insulin only if OFF of your insulin pump, Disp: 15 mL, Rfl: 3    insulin lispro-aabc (LYUMJEV U-100 INSULIN) 100 unit/mL, Inject 80 Units into the skin continuous. Uses up to 80 units daily with omnipod 5 insulin pump as directed., Disp: 80 mL, Rfl: 6    insulin pump cart,automated,BT (OMNIPOD 5 G6 PODS, GEN 5,) Crtg, INJECT 1 EACH INTO THE SKIN EVERY OTHER DAY., Disp: 15 each, Rfl: 11    isosorbide mononitrate (IMDUR) 30 MG 24 hr tablet, Take 1 tablet (30 mg total) by mouth once daily., Disp: 90 tablet, Rfl: 3    metoprolol succinate (TOPROL-XL) 50 MG 24 hr tablet, Take 50 mg by mouth 2 (two) times daily., Disp: , Rfl:     oxyCODONE-acetaminophen (PERCOCET) 5-325 mg per tablet, Take 1 tablet by mouth every 6 (six) hours as needed for Pain., Disp: 5 tablet, Rfl: 0    rosuvastatin (CRESTOR) 20 MG tablet, Take 20 mg by mouth once daily., Disp: , Rfl:     torsemide (DEMADEX) 20 MG Tab, Take 1 tablet (20 mg total) by mouth once daily., Disp: 90 tablet, Rfl: 3    Current Facility-Administered Medications:     ciprofloxacin HCl tablet 500 mg, 500 mg, Oral, 1 time in Clinic/HOD,     PHYSICAL EXAMINATION:    Constitutional: He appears well-developed and well-nourished.  He is in no apparent distress.    Eyes: No  scleral icterus noted bilaterally. No discharge bilaterally.    Nose: No rhinorrhea    Cardiovascular: Normal rate.      Pulmonary/Chest: Effort normal. No respiratory distress.     Abdominal:  He exhibits no distension.      Neurological: He is alert and oriented to person, place, and time.     Psych: Cooperative with normal affect.    No CVA tenderness noted.  Normal testicular exam on both sides.  Slightly tender on the right epid? On palpation.  Np swelling or erythema noted in the scrotum.      Lab Results   Component Value Date    PSA 1.8 05/22/2024    PSA 1.2 04/06/2023    PSA 1.2 03/16/2022      Urine Dip: pH 5.5, Negative for all components.     CT RENAL STONE WO CONTRAST 6/6/2025  Kidneys/ureters:  Moderate to severe left-sided hydronephrosis secondary to a 5 x 7 x 8 mm left proximal to mid ureteral calculus series 314 image 77. Additional nonobstructing left interpolar calyceal stones and possible tiny left upper pole calyceal stones. Nonobstructing right calyceal stones are present. No right-sided ureteral calculus or hydronephrosis.     US 10/13/23  Mild right hydronephrosis, distal ureteral calculus not excluded noting only left ureteral jet is able to be identified.  Developing obstructive uropathy is of concern.  Correlation is advised.     Renal findings suggest chronic medical renal disease.     Right pleural effusion.     Right adrenal nodule.    CT 10/12/23  Mild right hydronephrosis and right hydroureter with gradual tapering without evidence for ureteral calculus, these findings may relate to sequelae of recently passed calculus, clinical and historical correlation is needed.     Mild perinephric stranding bilaterally, nonspecific however correlation for UTI/pyelonephritis is needed.     Mild urinary bladder wall thickening may relate to incomplete distention however correlation for UTI/cystitis is needed.     Mild circumferential thickening of the distal descending colon/proximal sigmoid colon  with mild pericolonic haziness, correlation for mild colitis to include mild diverticulitis is needed.     Right adrenal nodule likely representing adrenal adenoma.    CT RSS 10/3/23 at   There is no evidence for calyceal stone in the right kidney. There is linear vascular calcification the anterior upper pole of the right kidney. There are new hyperdense foci in the mid left kidney, which may represent small hyperdense cysts. There is very mild fullness of the proximal right renal collecting system, but without evidence for a ureteral calculus. The remainder of the right ureter is decompressed. The urinary bladder is decompressed. There is asymmetric soft tissue stranding of the right perinephric fat. The possibility of right pyelonephritis should be considered in the differential.      CT RSS 6/27/22  Nonobstructing 10 mm left renal stone.  No obstructive uropathy.   Fecalization of distal small bowel loops, suggesting slow transit.  Enlarged prostate.   Mildly nodular hepatic contour.  Correlate for chronic liver disease.    KUB today 10/9/23  Surgical clips right upper quadrant and right paracentral pelvis, 7 mm diagonal vascular calcification overlies left renal nicole, stable.  Nonobstructive tiny stone right mid renal pole and 9.5 mm size stone left mid renal parenchyma cannot be discriminated.  Kidneys obscured by overlying bowel structures, and no stones identified region of ureters and urinary bladder.       IMPRESSION:  Hematuria, microscopic  -     Urine Culture High Risk  -     ciprofloxacin HCl tablet 500 mg    BPH with lower urinary tract symptoms without urinary obstruction  -     Prostate Specific Antigen, Diagnostic; Future; Expected date: 06/09/2025    Ureterolithiasis  -     Case Request Operating Room: URETEROSCOPY, WITH LASER LITHOTRIPSY    Other orders  -     cefTRIAXone (ROCEPHIN) 1 gram injection; Inject 1 g into the muscle once. for 1 dose  Dispense: 1 g; Refill: 0        Urine for culture    To OR for left ureteroscopy/pyeloscopy with laser lithotripsy and possible right retrograde pyelogram  Recommend to drink plenty of water, high amount of citric acid.  Follow up with Dr. Rowe, his nephrologist who he recently saw   Continue to get a better control of his diabetes and HTN.    I spent 25 minutes with the patient of which more than half was spent in direct consultation with the patient in regards to our treatment and plan.       PLAN:  Follow up in about 1 day (around 6/10/2025), or left ureteroscopy laser lithotripsy stone removal, cysto left ureteral stent placement, RPG bilat..

## 2025-06-09 NOTE — TELEPHONE ENCOUNTER
Pt states he went to the ER at  over the weekend and was told he had an obstructive stone. Appp't made to see dr min today.

## 2025-06-09 NOTE — PROGRESS NOTES
"      CHIEF COMPLAINT:    Mr. Arrieta is a 69 y.o. male presenting to discuss his upcoming surgery.  Hospitalization discharge follow up.  PRESENTING ILLNESS:    Jian Arrieta is a 69 y.o. male who presents for evaluation of right flank pain.    PMHx MARIE (liver bx 3/2019 w/o cirrhosis), hx of sleeve gastrectomy, CAD s/p CABG, and CKD3 w/ hx of obstructive nephrolithiasis who presents to the ED from nephrology clinic for worsening renal function for last 2 weeks. Pt. Baseline Cr ~2.2, but he recently presented to [facility] ED with B/L flank pain. Cr wasmildly elevated at 2.56 and CT renal stone revealed "mild fullness of the proximal R renal collecting system without evidence of obstructing stone and surrounding perinephric stranding." Pt. Was discharged from the ED with PO doxycycline to treat possible pyelonephritis although U/A was LE negative and not sent for culture. he obtained nephrology f/u yesterday, and on repeat labs Cr noted to be 4.7 and K+ 5.2., so he was referred to the ED. He reports some persistent flank pain, but it has improved. He denies any fevers, chills, nausea, or vomiting. No reported changes in urine output, dysuria, or hematuria. On ED arrival, pt. Noted to have  and pt. Reports that his omnipod Dash - Insulin pump must be malfunctioning, although he is not sure when it stopped working. Labs 3 days ago show normal BG.  10/13- admitted for uncontrollled diabetes, acute renal failure, obstructive nephropathy. Recently treated for pyelo w doxycycline. /79   Pulse 97  . Cr 4.9-> 4.5 BL 2.1.  -> 340. Received NS. UO- 300 cc.  One BM this am.  Nurse reports leg hot and red, photos and put in file - exam consistent w stasis dermatitis. AF. Started empiric clindamycin, this am, and discontinued.  continue NS x one liter.  Urology to place stent in am.   Cxr - mild pulm edema  CT abd - Mild right hydronephrosis and right hydroureter with gradual tapering without evidence for ureteral " calculus, these findings may relate to sequelae of recently passed calculus, clinical and historical correlation is needed.  Mild perinephric stranding bilaterally, nonspecific however correlation for UTI/pyelonephritis is needed.  Mild urinary bladder wall thickening may relate to incomplete distention however correlation for UTI/cystitis is needed.  Mild circumferential thickening of the distal descending colon/proximal sigmoid colon with mild pericolonic haziness, correlation for mild colitis to include mild diverticulitis is needed.  Right adrenal nodule likely representing adrenal adenoma.    The patient presents today for a one year follow up. Of note he presented to the  ED on 6/6 due to left flank pain. He was found to have a proximal ureteral stone and an ELIEZER.     10/14 - appreciate Urology f/u - no need for stent because renal function improving. Renal US showing mild right hydronephrosis. Cr 4.5-> 3.7  baseline around 2.2. Continuing IVFs.  follow up with Urology- Dr. Diaz outpatient for MAG3. Resume diabetic diet. Endocrine following.  BS 92 this am.   10/15 - Cr 3.8 from 3.9 from 4.5, baseline around 2.2.  NPO at midnight for possible stent tomorrow. Appreciate Urology f/u.   10/16 -  lab cr 3.5.  NPO.   10/17 - stent placed successfully yesterday. Eating. Cr 3.1 continues to improve. Will dc to home today. F/u Urology and int med.   6/6 - The patient presented to  for left flank pain and found to have a left ureteral stone      Because of right hydronephrosis and ELIEZER. Pt underwent urgent urologic surgery on 10/16/23.  Procedure: 10/16/23  1.  Cystoscopy with right retrograde pyelogram  2.  Fluoroscopy < 1 hour  3.  Intra-operative interpretation of radiographic images  4.  Right ureteroscopy  5.  Right pyeloscopy   6.  Right ureteral stent placement  Operative Findings:   Right retrograde pyelogram with questionable filling defect at right UPJ  Right ureteroscopy with radiolucent stone identified at  right UPJ  Right pyeloscopy revealed turbid urine and additional radiolucent stone burden in the right kidney  Right ureteral stent placed with good coils on fluoroscopy and direct vision in the bladder    Stone Analysis  80% Uric acid   20% Calcium oxalate monohydrate     He had bilateral hydrocelectomy on 6/16/21.  He saw Dr. Diaz on 6/21/21 and was noted to have a post op hematoma.  He has not started urocit k due to decreased kidney function.  Tried viagra in the past and it worked well for him.    PATIENT HISTORY:    Past Medical History:   Diagnosis Date    Alcohol abuse     Anasarca 1/28/2019    Anemia     Anticoagulant long-term use     Arthropathy associated with neurological disorder 9/2/2015    Atherosclerosis     Charcot foot due to diabetes mellitus     Chronic combined systolic and diastolic heart failure 01/29/2019 1-28-19 Left VentricleModerate decreased ejection fraction at 30%. Normal left ventricular cavity size. Normal wall thickness observed. Grade I (mild) left ventricular diastolic dysfunction consistent with impaired relaxation. Normal left atrial pressure. Moderate, global hypokinesis(see wall scoring diagram). Right VentricleNormal cavity size, wall thickness and ejection fraction. Wall motion n    Chronic pancreatitis 1/28/2019    CKD (chronic kidney disease) stage 3, GFR 30-59 ml/min     CKD (chronic kidney disease) stage 4, GFR 15-29 ml/min     Colon polyps     approx 5 yrs ago    Coronary artery disease due to calcified coronary lesion 05/08/2015    5 stents on ASA      Diabetic polyneuropathy associated with type 2 diabetes mellitus 9/2/2015    Diverticulosis 1/28/2019    DM type 2 with diabetic peripheral neuropathy 2/4/2019    Encounter for blood transfusion     Essential hypertension 1/28/2019    Former smoker 8/26/2015    Healed ulcer of left foot on examination 6/20/2017    Hematuria     Hydrocele     approx 1.5 yrs ago    Hyperphosphatemia     Hypoalbuminemia 2/4/2019     Hypocalcemia     Lymphedema of both lower extremities 1/29/2019    Mixed hyperlipidemia 5/8/2015    Morbid obesity with BMI of 50.0-59.9, adult 5/8/2015    Obstruction of right ureteropelvic junction (UPJ) due to stone 5/24/2021    Onychomycosis of multiple toenails with type 2 diabetes mellitus and peripheral neuropathy 6/20/2017    Perianal cyst     approx 2 yrs ago    Proteinuria     Pseudocyst of pancreas 1/28/2019 1-28-19 Liver has a cirrhotic morphology with no focal lesions.  Significant interval increase in ascites when compared to prior exam which may account for patient's abdominal distension.  Hypodense air-fluid collection along the body of the pancreas which is slightly smaller when compared to prior CT.  Findings may relate to pancreatic necrosis with pancreatic pseudocysts with infected pseudocyst    Skin cancer     skin cancer    Sleep apnea 8/26/2015    Status post bariatric surgery 1/11/2016    Type 2 diabetes mellitus, with long-term current use of insulin 5/8/2015    Urinary tract infection        Past Surgical History:   Procedure Laterality Date    ANGIOGRAPHY N/A 6/28/2019    Procedure: ANGIOGRAM-PV STENT;  Surgeon: Ewa Diagnostic Provider;  Location: [facility] OR;  Service: Radiology;  Laterality: N/A;    ANGIOPLASTY      total x5 stents    AORTOGRAPHY WITH SERIALOGRAPHY N/A 11/26/2024    Procedure: AORTOGRAM, WITH SERIALOGRAPHY;  Surgeon: Clinton Gibbs MD;  Location: [facility] CATH LAB/EP;  Service: Cardiology;  Laterality: N/A;    COLONOSCOPY N/A 10/6/2015    Procedure: COLONOSCOPY;  Surgeon: Shekhar Richards MD;  Location: [facility] ENDO (2ND FLR);  Service: Endoscopy;  Laterality: N/A;  BMI over 55/2nd floor case    5 day hold Plavix, Dr Kwadwo Arroyo    COLONOSCOPY N/A 5/13/2021    Procedure: COLONOSCOPY;  Surgeon: Huan Brumfield MD;  Location: [facility] ENDO;  Service: Endoscopy;  Laterality: N/A;    CORONARY ANGIOGRAPHY Right 3/20/2019    Procedure: ANGIOGRAM, CORONARY ARTERY;  Surgeon: Bob KHAN  MD Neto;  Location: [facility] CATH LAB;  Service: Cardiology;  Laterality: Right;    CORONARY ARTERY BYPASS GRAFT  2017    x3    CORONARY BYPASS GRAFT ANGIOGRAPHY  3/20/2019    Procedure: Bypass graft study;  Surgeon: Bob Duque MD;  Location: [facility] CATH LAB;  Service: Cardiology;;    CYST REMOVAL      CYSTOSCOPY Right 6/30/2021    Procedure: CYSTOSCOPY;  Surgeon: William Diaz MD;  Location: [facility] OR [facility] FLR;  Service: Urology;  Laterality: Right;    CYSTOSCOPY N/A 10/16/2023    Procedure: CYSTOSCOPY;  Surgeon: Chrystal Dias MD;  [facility];  Service: Urology;  Laterality: N/A;    CYSTOSCOPY N/A 12/6/2023    Procedure: CYSTOSCOPY;  Surgeon: William Diaz MD;  [facility];  Service: Urology;  Laterality: N/A;    CYSTOSCOPY W/ URETERAL STENT PLACEMENT Right 6/16/2021    Procedure: CYSTOSCOPY, WITH URETERAL STENT INSERTION;  Surgeon: William Diaz MD;  [facility];  Service: Urology;  Laterality: Right;  FLUORO LESS THAN 1 HOUR    ENDOSCOPIC ULTRASOUND OF UPPER GASTROINTESTINAL TRACT N/A 2/26/2020    Procedure: ULTRASOUND, UPPER GI TRACT, ENDOSCOPIC;  Surgeon: Robbie Yang MD;  Location: [facility];  Service: Endoscopy;  Laterality: N/A;    ESOPHAGOGASTRODUODENOSCOPY N/A 7/8/2019    Procedure: ESOPHAGOGASTRODUODENOSCOPY (EGD);  Surgeon: Huan Brumfield MD;  Location: [facility];  Service: Endoscopy;  Laterality: N/A;    ESOPHAGOGASTRODUODENOSCOPY N/A 5/13/2021    Procedure: EGD (ESOPHAGOGASTRODUODENOSCOPY);  Surgeon: Huan Brumfield MD;  Location: [facility];  Service: Endoscopy;  Laterality: N/A;    EXCISION OF HYDROCELE Bilateral 6/16/2021    Procedure: HYDROCELE REPAIR;  Surgeon: William Diaz MD;  Location: [facility] OR 2ND FLR;  Service: Urology;  Laterality: Bilateral;  2 HOURS    EXTRACTION - STONE Right 12/6/2023    Procedure: EXTRACTION - STONE;  Surgeon: William Diaz MD;  Location: [facility] OR [address] [place] [address];  Service: Urology;  Laterality: Right;     FLUOROSCOPY N/A 6/16/2021    Procedure: FLUOROSCOPY;  Surgeon: William Diaz MD;  Location: [facility] OR 2ND FLR;  Service: Urology;  Laterality: N/A;    GASTRECTOMY      INSERTION OF DIALYSIS CATHETER N/A 5/17/2019    Procedure: pleurx;  Surgeon: Ewa Diagnostic Provider;  Location: [facility] OR 2ND FLR;  Service: General;  Laterality: N/A;  Room 188/[place]    KNEE ARTHROSCOPY      LAPAROSCOPIC CHOLECYSTECTOMY N/A 5/4/2020    Procedure: CHOLECYSTECTOMY, LAPAROSCOPIC;  Surgeon: SON Rowe MD;  Location: [facility] OR;  Service: General;  Laterality: N/A;    LASER LITHOTRIPSY N/A 6/30/2021    Procedure: LITHOTRIPSY, USING LASER;  Surgeon: William Diaz MD;  Location: [facility] OR 1ST FLR;  Service: Urology;  Laterality: N/A;    LIVER BIOPSY N/A 5/4/2020    Procedure: BIOPSY, LIVER, Laproscopic ;  Surgeon: SON Rowe MD;  Location: [facility] OR;  Service: General;  Laterality: N/A;    perianal surgery      perianal cyst removed    PYELOSCOPY Right 6/30/2021    Procedure: PYELOSCOPY;  Surgeon: William Diaz MD;  Location: [facility] OR 1ST FLR;  Service: Urology;  Laterality: Right;    PYELOSCOPY Right 10/16/2023    Procedure: PYELOSCOPY;  Surgeon: Chrystal Dias MD;  Location: [facility] OR 1ST FLR;  Service: Urology;  Laterality: Right;    PYELOSCOPY Right 12/6/2023    Procedure: PYELOSCOPY;  Surgeon: William Diaz MD;  Location: [facility] OR 1ST FLR;  Service: Urology;  Laterality: Right;    REMOVAL-STENT Right 12/6/2023    Procedure: REMOVAL-STENT;  Surgeon: William Diaz MD;  Location: [facility] OR 1ST FLR;  Service: Urology;  Laterality: Right;    REPLACEMENT OF STENT Right 6/30/2021    Procedure: REPLACEMENT, STENT;  Surgeon: William Diaz MD;  Location: [facility] OR 1ST FLR;  Service: Urology;  Laterality: Right;    RETROGRADE PYELOGRAPHY Right 10/16/2023    Procedure: PYELOGRAM, RETROGRADE;  Surgeon: Chrystal Dias MD;  Location: [facility] OR [address] [place] [address];  Service: Urology;  Laterality: Right;    RETROGRADE PYELOGRAPHY Right  2023    Procedure: PYELOGRAM, RETROGRADE;  Surgeon: William Diaz MD;  Location: [facility] OR [facility] FLR;  Service: Urology;  Laterality: Right;    TOE AMPUTATION Left 1/3/2025    Procedure: AMPUTATION, TOE;  Surgeon: Savanah Felton DPM;  Location: [facility] OR;  Service: Podiatry;  Laterality: Left;    URETERAL STENT PLACEMENT Right 10/16/2023    Procedure: INSERTION, STENT, URETER;  Surgeon: Chrystal Dias MD;  [facility];  Service: Urology;  Laterality: Right;    URETEROSCOPY Right 2021    Procedure: URETEROSCOPY FLEXIBLE URETEROSCOPE;  Surgeon: William Diaz MD;  [facility];  Service: Urology;  Laterality: Right;    URETEROSCOPY Right 10/16/2023    Procedure: URETEROSCOPY;  Surgeon: Chrystal Dias MD;  [facility];  Service: Urology;  Laterality: Right;    URETEROSCOPY Right 2023    Procedure: URETEROSCOPY;  Surgeon: William Diaz MD;  [facility];  Service: Urology;  Laterality: Right;       Family History   Problem Relation Name Age of Onset    Cancer Mother      Cancer Father      Heart disease Father      Obesity Sister      Parkinsonism Brother      No Known Problems Paternal Grandmother      Cancer Paternal Grandfather      Cancer Brother      Diabetes Maternal Grandmother      Stroke Maternal Grandfather      Cirrhosis Neg Hx         Social History     Socioeconomic History    Marital status:    Tobacco Use    Smoking status: Former     Current packs/day: 0.00     Average packs/day: 2.0 packs/day for 30.0 years (60.0 ttl pk-yrs)     Types: Cigarettes     Start date: 1975     Quit date: 2005     Years since quittin.3    Smokeless tobacco: Never   Substance and Sexual Activity    Alcohol use: No     Comment: started ~, reports 1 shot daily, max 3 shots daily, vague about alcohol consumption. Last drink 2018    Drug use: No     Social Drivers of Health     Financial Resource Strain: Patient  Declined (1/3/2025)    Overall Financial Resource Strain (CARDIA)     Difficulty of Paying Living Expenses: Patient declined   Food Insecurity: No Food Insecurity (1/6/2025)    Hunger Vital Sign     Worried About Running Out of Food in the Last Year: Never true     Ran Out of Food in the Last Year: Never true   Transportation Needs: No Transportation Needs (1/6/2025)    TRANSPORTATION NEEDS     Transportation : No   Physical Activity: Patient Declined (11/27/2024)    Exercise Vital Sign     Days of Exercise per Week: Patient declined     Minutes of Exercise per Session: Patient declined   Stress: Stress Concern Present (1/6/2025)    [employer] - Occupational Stress Questionnaire     Feeling of Stress : To some extent   Housing Stability: Unknown (1/6/2025)    Housing Stability Vital Sign     Unable to Pay for Housing in the Last Year: No     Homeless in the Last Year: No       Allergies:  Patient has no known allergies.    Medications:    Current Outpatient Medications:     acetaminophen (TYLENOL) 325 MG tablet, Take 2 tablets (650 mg total) by mouth every 8 (eight) hours as needed for Pain., Disp: 30 tablet, Rfl: 0    allopurinoL (ZYLOPRIM) 100 MG tablet, Take 100 mg by mouth., Disp: , Rfl:     aspirin (ECOTRIN) 81 MG EC tablet, Take 1 tablet (81 mg total) by mouth once daily., Disp: 90 tablet, Rfl: 3    blood pressure monitor Kit, 1 kit by Misc.(Non-Drug; Combo Route) route 2 (two) times a day., Disp: 1 each, Rfl: 0    blood-glucose sensor (DEXCOM G6 SENSOR) Sandi, Inject 1 each into the skin every 10 days., Disp: 9 each, Rfl: 3    blood-glucose transmitter (DEXCOM G6 TRANSMITTER) Sandi, Inject 1 each into the skin every 10 days., Disp: 1 each, Rfl: 3    calcitRIOL (ROCALTROL) 0.25 MCG Cap, Take 0.25 mcg by mouth once daily., Disp: , Rfl:     ceFEPIme 1 gram injection, Inject 1 g into the vein once daily. End 1/8/25, Disp: , Rfl:     cefTRIAXone (ROCEPHIN) 1 gram injection, Inject 1 g  into the muscle once. for 1 dose, Disp: 1 g, Rfl: 0    clopidogreL (PLAVIX) 75 mg tablet, Take 1 tablet (75 mg total) by mouth once daily., Disp: 90 tablet, Rfl: 3    glucagon (BAQSIMI) 3 mg/actuation Spry, 1 each by Nasal route daily as needed (if glucose is less than 70 - can repeat in 1 hour if still low/less than 70)., Disp: 2 each, Rfl: 3    HUMALOG KWIKPEN INSULIN 100 unit/mL pen, Inject 5 Units into the skin before meals as needed (before each meal - if OFF of insulin pump). This is emergency back up insulin to use with meals if OFF of insulin pump, Disp: 15 mL, Rfl: 3    insulin detemir U-100, Levemir, 100 unit/mL (3 mL) SubQ InPn pen, Inject 30 Units into the skin every evening. This is emergency back up insulin only if OFF of your insulin pump, Disp: 15 mL, Rfl: 3    insulin lispro-aabc (LYUMJEV U-100 INSULIN) 100 unit/mL, Inject 80 Units into the skin continuous. Uses up to 80 units daily with omnipod 5 insulin pump as directed., Disp: 80 mL, Rfl: 6    insulin pump cart,automated,BT (OMNIPOD 5 G6 PODS, GEN 5,) Crtg, INJECT 1 EACH INTO THE SKIN EVERY OTHER DAY., Disp: 15 each, Rfl: 11    isosorbide mononitrate (IMDUR) 30 MG 24 hr tablet, Take 1 tablet (30 mg total) by mouth once daily., Disp: 90 tablet, Rfl: 3    metoprolol succinate (TOPROL-XL) 50 MG 24 hr tablet, Take 50 mg by mouth 2 (two) times daily., Disp: , Rfl:     oxyCODONE-acetaminophen (PERCOCET) 5-325 mg per tablet, Take 1 tablet by mouth every 6 (six) hours as needed for Pain., Disp: 5 tablet, Rfl: 0    rosuvastatin (CRESTOR) 20 MG tablet, Take 20 mg by mouth once daily., Disp: , Rfl:     torsemide (DEMADEX) 20 MG Tab, Take 1 tablet (20 mg total) by mouth once daily., Disp: 90 tablet, Rfl: 3    Current Facility-Administered Medications:     ciprofloxacin HCl tablet 500 mg, 500 mg, Oral, 1 time in Clinic/HOD,     PHYSICAL EXAMINATION:    Constitutional: He appears well-developed and well-nourished.  He is in no apparent distress.    Eyes: No  scleral icterus noted bilaterally. No discharge bilaterally.    Nose: No rhinorrhea    Cardiovascular: Normal rate.      Pulmonary/Chest: Effort normal. No respiratory distress.     Abdominal:  He exhibits no distension.      Neurological: He is alert and oriented to person, place, and time.     Psych: Cooperative with normal affect.    No CVA tenderness noted.  Normal testicular exam on both sides.  Slightly tender on the right epid? On palpation.  Np swelling or erythema noted in the scrotum.      Lab Results   Component Value Date    PSA 1.8 05/22/2024    PSA 1.2 04/06/2023    PSA 1.2 03/16/2022      Urine Dip: pH 5.5, Negative for all components.     CT RENAL STONE WO CONTRAST 6/6/2025  Kidneys/ureters:  Moderate to severe left-sided hydronephrosis secondary to a 5 x 7 x 8 mm left proximal to mid ureteral calculus series 314 image 77. Additional nonobstructing left interpolar calyceal stones and possible tiny left upper pole calyceal stones. Nonobstructing right calyceal stones are present. No right-sided ureteral calculus or hydronephrosis.     US 10/13/23  Mild right hydronephrosis, distal ureteral calculus not excluded noting only left ureteral jet is able to be identified.  Developing obstructive uropathy is of concern.  Correlation is advised.     Renal findings suggest chronic medical renal disease.     Right pleural effusion.     Right adrenal nodule.    CT 10/12/23  Mild right hydronephrosis and right hydroureter with gradual tapering without evidence for ureteral calculus, these findings may relate to sequelae of recently passed calculus, clinical and historical correlation is needed.     Mild perinephric stranding bilaterally, nonspecific however correlation for UTI/pyelonephritis is needed.     Mild urinary bladder wall thickening may relate to incomplete distention however correlation for UTI/cystitis is needed.     Mild circumferential thickening of the distal descending colon/proximal sigmoid colon  with mild pericolonic haziness, correlation for mild colitis to include mild diverticulitis is needed.     Right adrenal nodule likely representing adrenal adenoma.    CT RSS 10/3/23 at   There is no evidence for calyceal stone in the right kidney. There is linear vascular calcification the anterior upper pole of the right kidney. There are new hyperdense foci in the mid left kidney, which may represent small hyperdense cysts. There is very mild fullness of the proximal right renal collecting system, but without evidence for a ureteral calculus. The remainder of the right ureter is decompressed. The urinary bladder is decompressed. There is asymmetric soft tissue stranding of the right perinephric fat. The possibility of right pyelonephritis should be considered in the differential.      CT RSS 6/27/22  Nonobstructing 10 mm left renal stone.  No obstructive uropathy.   Fecalization of distal small bowel loops, suggesting slow transit.  Enlarged prostate.   Mildly nodular hepatic contour.  Correlate for chronic liver disease.    KUB today 10/9/23  Surgical clips right upper quadrant and right paracentral pelvis, 7 mm diagonal vascular calcification overlies left renal nicole, stable.  Nonobstructive tiny stone right mid renal pole and 9.5 mm size stone left mid renal parenchyma cannot be discriminated.  Kidneys obscured by overlying bowel structures, and no stones identified region of ureters and urinary bladder.       IMPRESSION:  Hematuria, microscopic  -     Urine Culture High Risk  -     ciprofloxacin HCl tablet 500 mg    BPH with lower urinary tract symptoms without urinary obstruction  -     Prostate Specific Antigen, Diagnostic; Future; Expected date: 06/09/2025    Ureterolithiasis  -     Case Request Operating Room: URETEROSCOPY, WITH LASER LITHOTRIPSY    Other orders  -     cefTRIAXone (ROCEPHIN) 1 gram injection; Inject 1 g into the muscle once. for 1 dose  Dispense: 1 g; Refill: 0        Urine for culture    To OR for left ureteroscopy/pyeloscopy with laser lithotripsy and possible right retrograde pyelogram  Recommend to drink plenty of water, high amount of citric acid.  Follow up with Dr. Rowe, his nephrologist who he recently saw   Continue to get a better control of his diabetes and HTN.    I spent 25 minutes with the patient of which more than half was spent in direct consultation with the patient in regards to our treatment and plan.       PLAN:  Follow up in about 1 day (around 6/10/2025), or left ureteroscopy laser lithotripsy stone removal, cysto left ureteral stent placement, RPG bilat..

## 2025-06-10 ENCOUNTER — HOSPITAL ENCOUNTER (OUTPATIENT)
Facility: HOSPITAL | Age: 70
Discharge: HOME | End: 2025-06-10
Attending: UROLOGY | Admitting: UROLOGY
Payer: MEDICARE

## 2025-06-10 ENCOUNTER — ANESTHESIA EVENT (OUTPATIENT)
Dept: SURGERY | Facility: HOSPITAL | Age: 70
End: 2025-06-10
Payer: MEDICARE

## 2025-06-10 ENCOUNTER — PATIENT MESSAGE (OUTPATIENT)
Dept: UROLOGY | Facility: CLINIC | Age: 70
End: 2025-06-10

## 2025-06-10 ENCOUNTER — ANESTHESIA (OUTPATIENT)
Dept: SURGERY | Facility: HOSPITAL | Age: 70
End: 2025-06-10
Payer: MEDICARE

## 2025-06-10 DIAGNOSIS — N20.1 URETEROLITHIASIS: ICD-10-CM

## 2025-06-10 DIAGNOSIS — N20.0 BILATERAL KIDNEY STONES: Primary | ICD-10-CM

## 2025-06-10 LAB
BACTERIA UR CULT: NO GROWTH
POCT GLUCOSE: 267 MG/DL (ref 70–110)

## 2025-06-10 PROCEDURE — 25000003 PHARM REV CODE 250: Performed by: NURSE ANESTHETIST, CERTIFIED REGISTERED

## 2025-06-10 PROCEDURE — 37000009 HC ANESTHESIA EA ADD 15 MINS: Performed by: UROLOGY

## 2025-06-10 PROCEDURE — 63600175 PHARM REV CODE 636 W HCPCS: Performed by: NURSE ANESTHETIST, CERTIFIED REGISTERED

## 2025-06-10 PROCEDURE — 36000709 HC OR TIME LEV III EA ADD 15 MIN: Performed by: UROLOGY

## 2025-06-10 PROCEDURE — C1894 INTRO/SHEATH, NON-LASER: HCPCS | Performed by: UROLOGY

## 2025-06-10 PROCEDURE — 36000708 HC OR TIME LEV III 1ST 15 MIN: Performed by: UROLOGY

## 2025-06-10 PROCEDURE — 82962 GLUCOSE BLOOD TEST: CPT | Performed by: UROLOGY

## 2025-06-10 PROCEDURE — 82365 CALCULUS SPECTROSCOPY: CPT

## 2025-06-10 PROCEDURE — 74420 UROGRAPHY RTRGR +-KUB: CPT | Mod: 26,,, | Performed by: UROLOGY

## 2025-06-10 PROCEDURE — 63600175 PHARM REV CODE 636 W HCPCS

## 2025-06-10 PROCEDURE — 71000015 HC POSTOP RECOV 1ST HR: Performed by: UROLOGY

## 2025-06-10 PROCEDURE — 71000044 HC DOSC ROUTINE RECOVERY FIRST HOUR: Performed by: UROLOGY

## 2025-06-10 PROCEDURE — 37000008 HC ANESTHESIA 1ST 15 MINUTES: Performed by: UROLOGY

## 2025-06-10 PROCEDURE — 25500020 PHARM REV CODE 255: Performed by: UROLOGY

## 2025-06-10 PROCEDURE — C1769 GUIDE WIRE: HCPCS | Performed by: UROLOGY

## 2025-06-10 PROCEDURE — 27201423 OPTIME MED/SURG SUP & DEVICES STERILE SUPPLY: Performed by: UROLOGY

## 2025-06-10 PROCEDURE — 52356 CYSTO/URETERO W/LITHOTRIPSY: CPT | Mod: LT,,, | Performed by: UROLOGY

## 2025-06-10 PROCEDURE — C2617 STENT, NON-COR, TEM W/O DEL: HCPCS | Performed by: UROLOGY

## 2025-06-10 DEVICE — STENT URETERAL UNIV 6FR 24CM: Type: IMPLANTABLE DEVICE | Site: URETER | Status: FUNCTIONAL

## 2025-06-10 RX ORDER — ONDANSETRON HYDROCHLORIDE 2 MG/ML
4 INJECTION, SOLUTION INTRAVENOUS DAILY PRN
Status: DISCONTINUED | OUTPATIENT
Start: 2025-06-10 | End: 2025-06-10 | Stop reason: HOSPADM

## 2025-06-10 RX ORDER — ONDANSETRON HYDROCHLORIDE 2 MG/ML
INJECTION, SOLUTION INTRAVENOUS
Status: DISCONTINUED | OUTPATIENT
Start: 2025-06-10 | End: 2025-06-10

## 2025-06-10 RX ORDER — GLUCAGON 1 MG
1 KIT INJECTION
Status: DISCONTINUED | OUTPATIENT
Start: 2025-06-10 | End: 2025-06-10 | Stop reason: HOSPADM

## 2025-06-10 RX ORDER — PROPOFOL 10 MG/ML
VIAL (ML) INTRAVENOUS
Status: DISCONTINUED | OUTPATIENT
Start: 2025-06-10 | End: 2025-06-10

## 2025-06-10 RX ORDER — ONDANSETRON HYDROCHLORIDE 2 MG/ML
4 INJECTION, SOLUTION INTRAVENOUS ONCE
Status: COMPLETED | OUTPATIENT
Start: 2025-06-10 | End: 2025-06-10

## 2025-06-10 RX ORDER — LORAZEPAM 2 MG/ML
0.25 INJECTION INTRAMUSCULAR ONCE AS NEEDED
Status: DISCONTINUED | OUTPATIENT
Start: 2025-06-10 | End: 2025-06-10 | Stop reason: HOSPADM

## 2025-06-10 RX ORDER — CEFAZOLIN 2 G/1
2 INJECTION, POWDER, FOR SOLUTION INTRAMUSCULAR; INTRAVENOUS
Status: COMPLETED | OUTPATIENT
Start: 2025-06-10 | End: 2025-06-10

## 2025-06-10 RX ORDER — MIDAZOLAM HYDROCHLORIDE 1 MG/ML
INJECTION INTRAMUSCULAR; INTRAVENOUS
Status: DISCONTINUED | OUTPATIENT
Start: 2025-06-10 | End: 2025-06-10

## 2025-06-10 RX ORDER — LIDOCAINE HYDROCHLORIDE 20 MG/ML
INJECTION INTRAVENOUS
Status: DISCONTINUED | OUTPATIENT
Start: 2025-06-10 | End: 2025-06-10

## 2025-06-10 RX ORDER — TAMSULOSIN HYDROCHLORIDE 0.4 MG/1
0.4 CAPSULE ORAL DAILY
Qty: 30 CAPSULE | Refills: 0 | Status: SHIPPED | OUTPATIENT
Start: 2025-06-10 | End: 2025-07-10

## 2025-06-10 RX ORDER — OXYBUTYNIN CHLORIDE 5 MG/1
5 TABLET ORAL 3 TIMES DAILY
Qty: 42 TABLET | Refills: 0 | Status: SHIPPED | OUTPATIENT
Start: 2025-06-10 | End: 2025-06-24

## 2025-06-10 RX ORDER — SODIUM CHLORIDE 9 MG/ML
INJECTION, SOLUTION INTRAVENOUS CONTINUOUS
Status: DISCONTINUED | OUTPATIENT
Start: 2025-06-10 | End: 2025-06-10 | Stop reason: HOSPADM

## 2025-06-10 RX ORDER — FENTANYL CITRATE 50 UG/ML
INJECTION, SOLUTION INTRAMUSCULAR; INTRAVENOUS
Status: DISCONTINUED | OUTPATIENT
Start: 2025-06-10 | End: 2025-06-10

## 2025-06-10 RX ORDER — MEPERIDINE HYDROCHLORIDE 50 MG/ML
12.5 INJECTION INTRAMUSCULAR; INTRAVENOUS; SUBCUTANEOUS ONCE AS NEEDED
Status: DISCONTINUED | OUTPATIENT
Start: 2025-06-10 | End: 2025-06-10 | Stop reason: HOSPADM

## 2025-06-10 RX ORDER — ACETAMINOPHEN 500 MG
500 TABLET ORAL EVERY 6 HOURS PRN
Qty: 30 TABLET | Refills: 0 | Status: SHIPPED | OUTPATIENT
Start: 2025-06-10

## 2025-06-10 RX ORDER — HYDROMORPHONE HYDROCHLORIDE 1 MG/ML
0.2 INJECTION, SOLUTION INTRAMUSCULAR; INTRAVENOUS; SUBCUTANEOUS EVERY 5 MIN PRN
Status: DISCONTINUED | OUTPATIENT
Start: 2025-06-10 | End: 2025-06-10 | Stop reason: HOSPADM

## 2025-06-10 RX ORDER — PHENYLEPHRINE HYDROCHLORIDE 10 MG/ML
INJECTION INTRAVENOUS
Status: DISCONTINUED | OUTPATIENT
Start: 2025-06-10 | End: 2025-06-10

## 2025-06-10 RX ORDER — ROCURONIUM BROMIDE 10 MG/ML
INJECTION, SOLUTION INTRAVENOUS
Status: DISCONTINUED | OUTPATIENT
Start: 2025-06-10 | End: 2025-06-10

## 2025-06-10 RX ORDER — DEXAMETHASONE SODIUM PHOSPHATE 4 MG/ML
INJECTION, SOLUTION INTRA-ARTICULAR; INTRALESIONAL; INTRAMUSCULAR; INTRAVENOUS; SOFT TISSUE
Status: DISCONTINUED | OUTPATIENT
Start: 2025-06-10 | End: 2025-06-10

## 2025-06-10 RX ORDER — ONDANSETRON HYDROCHLORIDE 2 MG/ML
INJECTION, SOLUTION INTRAVENOUS
Status: COMPLETED
Start: 2025-06-10 | End: 2025-06-10

## 2025-06-10 RX ADMIN — PHENYLEPHRINE HYDROCHLORIDE 100 MCG: 10 INJECTION INTRAVENOUS at 01:06

## 2025-06-10 RX ADMIN — FENTANYL CITRATE 50 MCG: 50 INJECTION, SOLUTION INTRAMUSCULAR; INTRAVENOUS at 01:06

## 2025-06-10 RX ADMIN — LIDOCAINE HYDROCHLORIDE 50 MG: 20 INJECTION INTRAVENOUS at 12:06

## 2025-06-10 RX ADMIN — PHENYLEPHRINE HYDROCHLORIDE 200 MCG: 10 INJECTION INTRAVENOUS at 01:06

## 2025-06-10 RX ADMIN — FENTANYL CITRATE 50 MCG: 50 INJECTION, SOLUTION INTRAMUSCULAR; INTRAVENOUS at 12:06

## 2025-06-10 RX ADMIN — SODIUM CHLORIDE: 0.9 INJECTION, SOLUTION INTRAVENOUS at 12:06

## 2025-06-10 RX ADMIN — ONDANSETRON 4 MG: 2 INJECTION INTRAMUSCULAR; INTRAVENOUS at 01:06

## 2025-06-10 RX ADMIN — PROPOFOL 120 MG: 10 INJECTION, EMULSION INTRAVENOUS at 12:06

## 2025-06-10 RX ADMIN — DEXAMETHASONE SODIUM PHOSPHATE 4 MG: 4 INJECTION, SOLUTION INTRAMUSCULAR; INTRAVENOUS at 12:06

## 2025-06-10 RX ADMIN — SUGAMMADEX 200 MG: 100 INJECTION, SOLUTION INTRAVENOUS at 01:06

## 2025-06-10 RX ADMIN — CEFAZOLIN 2 G: 2 INJECTION, POWDER, FOR SOLUTION INTRAMUSCULAR; INTRAVENOUS at 12:06

## 2025-06-10 RX ADMIN — ONDANSETRON HYDROCHLORIDE 4 MG: 2 INJECTION, SOLUTION INTRAVENOUS at 10:06

## 2025-06-10 RX ADMIN — ROCURONIUM BROMIDE 10 MG: 10 INJECTION, SOLUTION INTRAVENOUS at 01:06

## 2025-06-10 RX ADMIN — MIDAZOLAM HYDROCHLORIDE 2 MG: 2 INJECTION, SOLUTION INTRAMUSCULAR; INTRAVENOUS at 12:06

## 2025-06-10 RX ADMIN — ONDANSETRON 4 MG: 2 INJECTION INTRAMUSCULAR; INTRAVENOUS at 10:06

## 2025-06-10 RX ADMIN — ROCURONIUM BROMIDE 40 MG: 10 INJECTION, SOLUTION INTRAVENOUS at 12:06

## 2025-06-10 NOTE — OP NOTE
[facility] - Surgery ([facility])  Urology Department  Operative Note    Date: 06/10/2025    Pre-Op Diagnosis:   Left ureteral stone, Left kidney stone   Patient Active Problem List    Diagnosis Date Noted    Gangrene 01/04/2025    CKD (chronic kidney disease) stage 4, GFR 15-29 ml/min 01/04/2025    Type 2 diabetes mellitus with left diabetic foot infection 01/03/2025    Gangrene of toe of left foot 11/26/2024    Critical limb ischemia of left lower extremity 11/26/2024    Stage 4 chronic kidney disease 02/23/2024    Chronic diastolic congestive heart failure 02/23/2024    Type 2 diabetes mellitus with stage 4 chronic kidney disease, with long-term current use of insulin 02/23/2024    Hydronephrosis 10/16/2023    Diabetic nephropathy associated with type 2 diabetes mellitus 10/16/2023    Nephrotic range proteinuria 10/16/2023    Venous stasis dermatitis of both lower extremities 10/13/2023    Insulin pump status 10/13/2023    Right flank pain 10/09/2023    Hematuria, microscopic 10/09/2023    Diabetes mellitus type 1, with complication, on long term insulin pump 06/07/2023    Pseudomonas aeruginosa infection 08/19/2022    MSSA infection, non-invasive 08/19/2022    Venous insufficiency of both lower extremities 06/14/2022    History of colon polyps 01/13/2022    Ureteral stone 06/30/2021    Hematoma of scrotum 06/21/2021    Bilateral kidney stones 05/27/2021    Obstruction of right ureteropelvic junction (UPJ) due to stone 05/24/2021    Bilateral hydrocele 05/24/2021    Vitamin D deficiency 04/07/2021    Hx of CABG 03/31/2021    Class 3 obesity 03/15/2021    Atherosclerosis of aorta 05/11/2020    Other cirrhosis of liver     Current use of long term anticoagulation 03/10/2020    Medically noncompliant 08/22/2019    Gastritis     Portal hypertension due to obstruction of extrahepatic portal vein 06/27/2019    Hepatic fibrosis 06/25/2019    Hypertensive emergency 05/06/2019    Hyperlipidemia associated with type 2 diabetes  mellitus 05/06/2019    Paroxysmal atrial fibrillation 03/16/2019    Anemia 03/15/2019    Other ascites 03/13/2019    Type 2 diabetes mellitus with diabetic neuropathy, with long-term current use of insulin 02/04/2019    Hypoalbuminemia 02/04/2019    Lymphedema of both lower extremities 01/29/2019    Other chronic pancreatitis 01/28/2019    Pseudocyst of pancreas 01/28/2019    Aortic atherosclerosis     Onychomycosis of multiple toenails with type 2 diabetes mellitus and peripheral neuropathy 06/20/2017    Status post bariatric surgery 01/11/2016    Diabetic polyneuropathy associated with type 2 diabetes mellitus 09/02/2015    Sleep apnea 08/26/2015    Coronary artery disease due to calcified coronary lesion 05/08/2015    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  Left Ureteroscopy  2.  Left Pyeloscopy   3.  Left Ureteral Stent Placement   4.  Cystoscopy  5.  Laser Lithotripsy   6.  Bilateral Retrograde Pyelograms   7.  Fluoro < 1 h    Specimen(s): Stone for Analysis     Staff Surgeon: William Diaz MD    Assistant Surgeon: Corbin Seymour MD    Anesthesia: General endotracheal anesthesia    Indications: Jian Arrieta is a 69 y.o. male with a left ureteral stone, presenting for definitive stone management.  He currently does not have a JJ ureteral stent in place.      Findings: Radiolucent stone on  film. 7 mm ureteral stone along with a >1 cm left lower pole stone.     1 baskets were used throughout the case.      Estimated Blood Loss: min    Drains: 6 Fr x 24 cm JJ ureteral stent without strings    Procedure in detail:  After informed consent was obtained, the patient was brought the the cystoscopy suite and placed in the supine position.  SCDs were applied and working.  Anesthesia was administered.  The patient was then placed in the dorsal lithotomy position and prepped and draped in the usual sterile fashion.      A  rigid cystoscope in a 22 Fr sheath was introduced into the patient's urethra.  This passed easily.  The entire urethra was visualized which showed no strictures or masses.  Formal cystoscopy was performed which revealed no masses or lesions suspicious for malignancy, no bladder stones, no bladder diverticula, no trabeculations.  The ureteral orifices were visualized in the normal anatomic position bilaterally.     The right ureteral orifice was cannulated with a 5 fr open ended ureteral catheter. A retrograde pyelogram was performed which showed no filling defects on the right. The 5 fr open ended catheter was then removed.    Next a motion wire was passed up the left ureteral orifice and up into the kidney.  This passed easily and placement was confirmed using fluoro.  The cystoscope was removed keeping the wire in place.     A semirigid ureteroscope was passed into the patient's bladder alongside the wire under direct vision.  It was then passed through the left ureteral orifice alongside the wire.  A stone was encountered at the level of proximal ureter.  A 272 micron  laser fiber was passed through the ureteroscope. The stone was dusted using the laser. The remaining stone fragments were pushed into the kidney. A stiff glide wire was inserted through the ureteroscope into the renal pelvis.  The ureteroscope was removed, leaving both wires in place.      A 10/12 ureteral access sheath was then passed over the stiff glide wire under fluoroscopic guidance.  Subsequently, the flexible ureteroscope was passed into the patient's ureter through the access sheath under direct vision. Flexible pyeloscopy was performed systematically.  A stone was encountered in the renal pelvis as well as the lower pole.      The stone was dusted using the laser.  The laser fiber was removed and a Nitinol tipless basket was introduced through the ureteroscope.  Stone fragments were removed and collected for specimen analysis. Due to the  stone burden all the remaining stone fragments/matrix were unable to be cleared from the kidney. A retrograde pyelogram was performed through the ureteroscope.  There were no filling defects noted and the renal pelvis was opacified.  The ureteroscope was removed keeping the motion wire in place.  The entire course of the ureter was visualized as the ureteroscope and access sheath were simultaneously removed.  There were no significant ureteral fragments left behind.     A 6 Fr x 24 cm JJ ureteral stent without strings was passed over the wire and up into the renal pelvis using fluoro.  When the coil appeared to be in good position in the kidney, the wire was removed under continuous fluoro.  Good coils were seen in the kidney and the bladder using fluoro.      The patient tolerated the procedure well and was transferred to the recovery room in stable condition.      Disposition:  The patient will follow up with Dr. Diaz in 2 weeks for a second look Ureteroscopy.    Corbin Seymour MD

## 2025-06-10 NOTE — PROGRESS NOTES
Pt reports relief of nausea after zofran administration.    Pt with SOB and tachypnea in the field, resting comfortably now.

## 2025-06-10 NOTE — TRANSFER OF CARE
"Anesthesia Transfer of Care Note    Patient: Jian Arrieta    Procedure(s) Performed: Procedure(s) (LRB):  URETEROSCOPY, WITH LASER LITHOTRIPSY (Left)  CYSTOSCOPY (N/A)  REMOVAL, CALCULUS, URETER, URETEROSCOPIC (Left)  PLACEMENT-STENT (Left)  NEPHROSCOPY (Left)  PYELOGRAM, RETROGRADE (Right)    Patient location: PACU    Anesthesia Type: general    Transport from OR: Transported from OR on 6-10 L/min O2 by face mask with adequate spontaneous ventilation    Post pain: adequate analgesia    Post assessment: no apparent anesthetic complications and tolerated procedure well    Post vital signs: stable    Level of consciousness: lethargic, responds to stimulation, awake and alert    Nausea/Vomiting: no nausea/vomiting    Complications: none    Transfer of care protocol was followed    Last vitals: Visit Vitals  BP (!) 174/77 (BP Location: Left arm, Patient Position: Lying)   Pulse 77   Temp 37 °C (98.6 °F) (Temporal)   Resp 15   Ht 5' 7" (1.702 m)   Wt 115.7 kg (255 lb)   SpO2 100%   BMI 39.94 kg/m²     "

## 2025-06-10 NOTE — ANESTHESIA PROCEDURE NOTES
Intubation    Date/Time: 6/10/2025 12:40 PM    Performed by: Curt Kramer CRNA  Authorized by: Shellie Romero MD    Intubation:     Induction:  Intravenous    Intubated:  Postinduction    Mask Ventilation:  Not attempted    Attempts:  1    Attempted By:  CRNA    Method of Intubation:  Video laryngoscopy    Blade:  Butcher 3    Laryngeal View Grade: Grade I - full view of cords      Difficult Airway Encountered?: No      Complications:  None    Airway Device:  Oral endotracheal tube    Airway Device Size:  7.0    Style/Cuff Inflation:  Cuffed (inflated to minimal occlusive pressure)    Inflation Amount (mL):  8    Tube secured:  23    Secured at:  The lips    Placement Verified By:  Capnometry    Complicating Factors:  Cerda and obesity    Findings Post-Intubation:  BS equal bilateral and atraumatic/condition of teeth unchanged

## 2025-06-10 NOTE — H&P (VIEW-ONLY)
Bilateral kidney stones  -     Case Request Operating Room: URETEROSCOPY, WITH LASER LITHOTRIPSY, CYSTOSCOPY, WITH URETERAL STENT INSERTION

## 2025-06-10 NOTE — ANESTHESIA PREPROCEDURE EVALUATION
06/10/2025  Jian Arrieta is a 69 y.o., male.      Pre-op Assessment    I have reviewed the Patient Summary Reports.     I have reviewed the Nursing Notes.       Review of Systems  Anesthesia Hx:  No problems with previous Anesthesia                Hematology/Oncology:  Hematology Normal   Oncology Normal                                   EENT/Dental:  EENT/Dental Normal           Cardiovascular:     Hypertension   CAD       CHF                                   Pulmonary:  Pulmonary Normal                       Renal/:  Chronic Renal Disease, ESRD                Hepatic/GI:      Liver Disease,               Musculoskeletal:  Musculoskeletal Normal                Neurological:    Neuromuscular Disease,                                   Endocrine:  Diabetes           Dermatological:  Skin Normal    Psych:  Psychiatric History                  Physical Exam  General: Well nourished    Airway:  Mallampati: III   Mouth Opening: Normal  TM Distance: Normal  Tongue: Normal  Neck ROM: Normal ROM    Dental:  Intact        Anesthesia Plan  Type of Anesthesia, risks & benefits discussed:    Anesthesia Type: Gen Natural Airway, Gen Supraglottic Airway, Gen ETT  Intra-op Monitoring Plan: Standard ASA Monitors  Post Op Pain Control Plan: multimodal analgesia  Induction:  IV  Airway Plan: Direct  Informed Consent: Informed consent signed with the Patient and all parties understand the risks and agree with anesthesia plan.  All questions answered. Patient consented to blood products? No  ASA Score: 4  Day of Surgery Review of History & Physical: H&P Update referred to the surgeon/provider.    Ready For Surgery From Anesthesia Perspective.     .

## 2025-06-10 NOTE — DISCHARGE SUMMARY
[facility] - Surgery (1st Fl)  Discharge Note  Short Stay    Procedure(s) (LRB):  URETEROSCOPY, WITH LASER LITHOTRIPSY (Left)  CYSTOSCOPY (N/A)  REMOVAL, CALCULUS, URETER, URETEROSCOPIC (Left)  PLACEMENT-STENT (Left)  NEPHROSCOPY (Left)  PYELOGRAM, RETROGRADE (Right)      OUTCOME: Patient tolerated treatment/procedure well without complication and is now ready for discharge.    DISPOSITION: Home or Self Care    FINAL DIAGNOSIS:  <principal problem not specified>    FOLLOWUP: In clinic    DISCHARGE INSTRUCTIONS:  No discharge procedures on file.     TIME SPENT ON DISCHARGE: 15 minutes

## 2025-06-10 NOTE — PROGRESS NOTES
Pt called to c/o nausea in pre-op. Dr. Velasquez with anesthesia notified. Order given to administer 4mg of IV zofran once now for nausea.

## 2025-06-10 NOTE — INTERVAL H&P NOTE
The patient has been examined and the H&P has been reviewed:    I concur with the findings and no changes have occurred since H&P was written.    Surgery risks, benefits and alternative options discussed and understood by patient/family.      Urine dipstick - negative for all components.      There are no hospital problems to display for this patient.

## 2025-06-10 NOTE — BRIEF OP NOTE
[facility] - Surgery (1st Fl)  Brief Operative Note    SUMMARY     Surgery Date: 6/10/2025     Surgeons and Role:     * William Diaz MD - Primary     * Boby Gutierrez MD - Resident - Assisting     * Corbin Seymour MD - Resident - Assisting        Pre-op Diagnosis:  Ureterolithiasis [N20.1]    Post-op Diagnosis:  Post-Op Diagnosis Codes:     * Ureterolithiasis [N20.1]    Procedure(s) (LRB):  URETEROSCOPY, WITH LASER LITHOTRIPSY (Left)  CYSTOSCOPY (N/A)  REMOVAL, CALCULUS, URETER, URETEROSCOPIC (Left)  PLACEMENT-STENT (Left)  NEPHROSCOPY (Left)  PYELOGRAM, RETROGRADE (Right)    Anesthesia: Choice    Implants:  Implant Name Type Inv. Item Serial No.  Lot No. LRB No. Used Action   STENT URETERAL UNIV 6FR 24CM - [id number]  STENT URETERAL UNIV 6FR 24CM  [employer] [id number] Left 1 Implanted       Operative Findings: Right ureteral stone along with a > 1 cm lower pole stone.     Estimated Blood Loss: * No values recorded between 6/10/2025 12:25 PM and 6/10/2025  1:37 PM *    Estimated Blood Loss has not been documented. EBL = min.         Specimens:   Specimen (24h ago, onward)      None          ID Type Source Tests Collected by Time Destination   A :  Stone Kidney, Left URINARY STONE ANALYSIS William Diaz MD 6/10/2025 1302        [id number]

## 2025-06-11 ENCOUNTER — TELEPHONE (OUTPATIENT)
Dept: UROLOGY | Facility: HOSPITAL | Age: 70
End: 2025-06-11
Payer: MEDICARE

## 2025-06-11 ENCOUNTER — RESULTS FOLLOW-UP (OUTPATIENT)
Dept: UROLOGY | Facility: HOSPITAL | Age: 70
End: 2025-06-11

## 2025-06-11 DIAGNOSIS — N30.90 CYSTITIS: Primary | ICD-10-CM

## 2025-06-11 RX ORDER — CIPROFLOXACIN 500 MG/1
500 TABLET, FILM COATED ORAL DAILY
Qty: 3 TABLET | Refills: 0 | Status: SHIPPED | OUTPATIENT
Start: 2025-06-11

## 2025-06-11 NOTE — TELEPHONE ENCOUNTER
Cystitis  -     ciprofloxacin HCl (CIPRO) 500 MG tablet; Take 1 tablet (500 mg total) by mouth once daily.  Dispense: 3 tablet; Refill: 0     Please instruct him to take cipro on Monday, once a day before his surgery next Weds  OK to continue baby ASA daily.  Stop Plavix 5 days before his surgery.

## 2025-06-12 VITALS
TEMPERATURE: 99 F | RESPIRATION RATE: 19 BRPM | OXYGEN SATURATION: 100 % | DIASTOLIC BLOOD PRESSURE: 76 MMHG | BODY MASS INDEX: 40.02 KG/M2 | SYSTOLIC BLOOD PRESSURE: 162 MMHG | WEIGHT: 255 LBS | HEIGHT: 67 IN | HEART RATE: 81 BPM

## 2025-06-12 RX ORDER — ONDANSETRON 4 MG/1
4 TABLET, FILM COATED ORAL EVERY 8 HOURS PRN
COMMUNITY
Start: 2025-06-06

## 2025-06-12 RX ORDER — SEVELAMER CARBONATE 800 MG/1
800 TABLET, FILM COATED ORAL
COMMUNITY
Start: 2025-03-13 | End: 2026-03-13

## 2025-06-12 RX ORDER — NITROGLYCERIN 0.4 MG/1
0.4 TABLET SUBLINGUAL EVERY 5 MIN PRN
COMMUNITY

## 2025-06-12 NOTE — ANESTHESIA POSTPROCEDURE EVALUATION
Anesthesia Post Evaluation    Patient: Jian Arrieta    Procedure(s) Performed: Procedure(s) (LRB):  URETEROSCOPY, WITH LASER LITHOTRIPSY (Left)  CYSTOSCOPY (N/A)  REMOVAL, CALCULUS, URETER, URETEROSCOPIC (Left)  PLACEMENT-STENT (Left)  NEPHROSCOPY (Left)  PYELOGRAM, RETROGRADE (Right)    Final Anesthesia Type: general      Patient location during evaluation: PACU  Patient participation: Yes- Able to Participate  Level of consciousness: awake and alert and oriented  Post-procedure vital signs: reviewed and stable  Pain management: adequate  Airway patency: patent    PONV status at discharge: No PONV  Anesthetic complications: no      Cardiovascular status: blood pressure returned to baseline and hemodynamically stable  Respiratory status: unassisted and spontaneous ventilation  Hydration status: euvolemic  Follow-up not needed.              Vitals Value Taken Time   /76 06/10/25 14:30   Temp 37 °C (98.6 °F) 06/10/25 13:54   Pulse 81 06/10/25 14:30   Resp 19 06/10/25 14:30   SpO2 100 % 06/10/25 14:30         No case tracking events are documented in the log.      Pain/Jason Score: No data recorded

## 2025-06-13 NOTE — PRE-PROCEDURE INSTRUCTIONS
PreOp Instructions given:   - Verbal medication information (what to hold and what to take)   - NPO guidelines   NO SOLID FOOD, MILK OR MILK PRODUCTS 8 HOURS PRIOR TO SX START TIME.  YOU MAY ONLY HAVE SIPS OF WATER WITH MORNING MEDICATIONS (1) HOUR PRIOR TO ARRIVAL TO HOSPITAL.   - Arrival place directions given; time to be given the day before procedure by the   Surgeon's Office [facility]  - Bathing with antibacterial soap   - Don't wear any jewelry or bring any valuables AM of surgery   - No makeup or moisturizer to face   - No perfume/cologne, powder, lotions or aftershave   Pt. verbalized understanding.   Pt denies any h/o Anesthesia/Sedation complications or side effects.  Patient does not know arrival time.  Explained that this information comes from the surgeon's office and if they haven't heard from them by 2 or 3 pm to call the office.  Patient stated an understanding.     OmniPod Insulin Delivery system//bring extra setup to hospital     Hypoglycemia protocol reviewed w/Pt - Pt v/c/u

## 2025-06-16 ENCOUNTER — TELEPHONE (OUTPATIENT)
Dept: UROLOGY | Facility: CLINIC | Age: 70
End: 2025-06-16
Payer: MEDICARE

## 2025-06-17 ENCOUNTER — ANESTHESIA (OUTPATIENT)
Dept: SURGERY | Facility: HOSPITAL | Age: 70
End: 2025-06-17
Payer: MEDICARE

## 2025-06-17 ENCOUNTER — HOSPITAL ENCOUNTER (OUTPATIENT)
Facility: HOSPITAL | Age: 70
Discharge: HOME | End: 2025-06-17
Attending: UROLOGY | Admitting: UROLOGY
Payer: MEDICARE

## 2025-06-17 ENCOUNTER — ANESTHESIA EVENT (OUTPATIENT)
Dept: SURGERY | Facility: HOSPITAL | Age: 70
End: 2025-06-17
Payer: MEDICARE

## 2025-06-17 DIAGNOSIS — N20.0 NEPHROLITHIASIS: ICD-10-CM

## 2025-06-17 DIAGNOSIS — N22 CALCULUS OF URINARY TRACT IN DISEASES CLASSIFIED ELSEWHERE: Primary | ICD-10-CM

## 2025-06-17 LAB — POCT GLUCOSE: 105 MG/DL (ref 70–110)

## 2025-06-17 PROCEDURE — 63600175 PHARM REV CODE 636 W HCPCS: Performed by: NURSE ANESTHETIST, CERTIFIED REGISTERED

## 2025-06-17 PROCEDURE — 36000709 HC OR TIME LEV III EA ADD 15 MIN: Performed by: UROLOGY

## 2025-06-17 PROCEDURE — 71000044 HC DOSC ROUTINE RECOVERY FIRST HOUR: Performed by: UROLOGY

## 2025-06-17 PROCEDURE — 82962 GLUCOSE BLOOD TEST: CPT | Performed by: UROLOGY

## 2025-06-17 PROCEDURE — 52352 CYSTOURETERO W/STONE REMOVE: CPT | Mod: LT,,, | Performed by: UROLOGY

## 2025-06-17 PROCEDURE — 25000003 PHARM REV CODE 250: Performed by: NURSE ANESTHETIST, CERTIFIED REGISTERED

## 2025-06-17 PROCEDURE — 37000009 HC ANESTHESIA EA ADD 15 MINS: Performed by: UROLOGY

## 2025-06-17 PROCEDURE — 37000008 HC ANESTHESIA 1ST 15 MINUTES: Performed by: UROLOGY

## 2025-06-17 PROCEDURE — C1769 GUIDE WIRE: HCPCS | Performed by: UROLOGY

## 2025-06-17 PROCEDURE — 71000015 HC POSTOP RECOV 1ST HR: Performed by: UROLOGY

## 2025-06-17 PROCEDURE — 27201423 OPTIME MED/SURG SUP & DEVICES STERILE SUPPLY: Performed by: UROLOGY

## 2025-06-17 PROCEDURE — 63600175 PHARM REV CODE 636 W HCPCS

## 2025-06-17 PROCEDURE — C1894 INTRO/SHEATH, NON-LASER: HCPCS | Performed by: UROLOGY

## 2025-06-17 PROCEDURE — 52332 CYSTOSCOPY AND TREATMENT: CPT | Mod: 51,LT,, | Performed by: UROLOGY

## 2025-06-17 PROCEDURE — C2617 STENT, NON-COR, TEM W/O DEL: HCPCS | Performed by: UROLOGY

## 2025-06-17 PROCEDURE — 36000708 HC OR TIME LEV III 1ST 15 MIN: Performed by: UROLOGY

## 2025-06-17 DEVICE — STENT URETERAL UNIV 6FR 24CM: Type: IMPLANTABLE DEVICE | Site: URETER | Status: FUNCTIONAL

## 2025-06-17 RX ORDER — PROPOFOL 10 MG/ML
VIAL (ML) INTRAVENOUS
Status: DISCONTINUED | OUTPATIENT
Start: 2025-06-17 | End: 2025-06-17

## 2025-06-17 RX ORDER — DEXMEDETOMIDINE HYDROCHLORIDE 100 UG/ML
INJECTION, SOLUTION INTRAVENOUS
Status: DISCONTINUED | OUTPATIENT
Start: 2025-06-17 | End: 2025-06-17

## 2025-06-17 RX ORDER — LIDOCAINE HYDROCHLORIDE 20 MG/ML
INJECTION INTRAVENOUS
Status: DISCONTINUED | OUTPATIENT
Start: 2025-06-17 | End: 2025-06-17

## 2025-06-17 RX ORDER — TAMSULOSIN HYDROCHLORIDE 0.4 MG/1
0.4 CAPSULE ORAL DAILY
Qty: 8 CAPSULE | Refills: 0 | Status: SHIPPED | OUTPATIENT
Start: 2025-06-17 | End: 2025-06-25

## 2025-06-17 RX ORDER — FENTANYL CITRATE 50 UG/ML
INJECTION, SOLUTION INTRAMUSCULAR; INTRAVENOUS
Status: DISCONTINUED | OUTPATIENT
Start: 2025-06-17 | End: 2025-06-17

## 2025-06-17 RX ORDER — PHENAZOPYRIDINE HYDROCHLORIDE 100 MG/1
100 TABLET, FILM COATED ORAL 3 TIMES DAILY PRN
Qty: 24 TABLET | Refills: 0 | Status: SHIPPED | OUTPATIENT
Start: 2025-06-17 | End: 2025-06-25

## 2025-06-17 RX ORDER — CEFAZOLIN 2 G/1
2 INJECTION, POWDER, FOR SOLUTION INTRAMUSCULAR; INTRAVENOUS
Status: COMPLETED | OUTPATIENT
Start: 2025-06-17 | End: 2025-06-17

## 2025-06-17 RX ORDER — PHENYLEPHRINE HYDROCHLORIDE 10 MG/ML
INJECTION INTRAVENOUS
Status: DISCONTINUED | OUTPATIENT
Start: 2025-06-17 | End: 2025-06-17

## 2025-06-17 RX ORDER — MIDAZOLAM HYDROCHLORIDE 1 MG/ML
INJECTION INTRAMUSCULAR; INTRAVENOUS
Status: DISCONTINUED | OUTPATIENT
Start: 2025-06-17 | End: 2025-06-17

## 2025-06-17 RX ORDER — OXYBUTYNIN CHLORIDE 5 MG/1
5 TABLET ORAL 3 TIMES DAILY PRN
Qty: 24 TABLET | Refills: 0 | Status: SHIPPED | OUTPATIENT
Start: 2025-06-17 | End: 2025-06-25

## 2025-06-17 RX ORDER — DEXTROSE 40 %
15 GEL (GRAM) ORAL ONCE
COMMUNITY

## 2025-06-17 RX ORDER — DEXAMETHASONE SODIUM PHOSPHATE 4 MG/ML
INJECTION, SOLUTION INTRA-ARTICULAR; INTRALESIONAL; INTRAMUSCULAR; INTRAVENOUS; SOFT TISSUE
Status: DISCONTINUED | OUTPATIENT
Start: 2025-06-17 | End: 2025-06-17

## 2025-06-17 RX ORDER — ROCURONIUM BROMIDE 10 MG/ML
INJECTION, SOLUTION INTRAVENOUS
Status: DISCONTINUED | OUTPATIENT
Start: 2025-06-17 | End: 2025-06-17

## 2025-06-17 RX ORDER — ONDANSETRON HYDROCHLORIDE 2 MG/ML
INJECTION, SOLUTION INTRAVENOUS
Status: DISCONTINUED | OUTPATIENT
Start: 2025-06-17 | End: 2025-06-17

## 2025-06-17 RX ADMIN — MIDAZOLAM HYDROCHLORIDE 2 MG: 1 INJECTION, SOLUTION INTRAMUSCULAR; INTRAVENOUS at 12:06

## 2025-06-17 RX ADMIN — PHENYLEPHRINE HYDROCHLORIDE 100 MCG: 10 INJECTION INTRAVENOUS at 12:06

## 2025-06-17 RX ADMIN — PHENYLEPHRINE HYDROCHLORIDE 200 MCG: 10 INJECTION INTRAVENOUS at 01:06

## 2025-06-17 RX ADMIN — DEXMEDETOMIDINE 6 MCG: 100 INJECTION, SOLUTION, CONCENTRATE INTRAVENOUS at 01:06

## 2025-06-17 RX ADMIN — CEFAZOLIN 2 G: 2 INJECTION, POWDER, FOR SOLUTION INTRAMUSCULAR; INTRAVENOUS at 01:06

## 2025-06-17 RX ADMIN — FENTANYL CITRATE 100 MCG: 50 INJECTION, SOLUTION INTRAMUSCULAR; INTRAVENOUS at 12:06

## 2025-06-17 RX ADMIN — PROPOFOL 150 MG: 10 INJECTION, EMULSION INTRAVENOUS at 12:06

## 2025-06-17 RX ADMIN — ROCURONIUM BROMIDE 40 MG: 10 INJECTION, SOLUTION INTRAVENOUS at 12:06

## 2025-06-17 RX ADMIN — SODIUM CHLORIDE: 0.9 INJECTION, SOLUTION INTRAVENOUS at 12:06

## 2025-06-17 RX ADMIN — LIDOCAINE HYDROCHLORIDE 100 MG: 20 INJECTION INTRAVENOUS at 12:06

## 2025-06-17 RX ADMIN — DEXAMETHASONE SODIUM PHOSPHATE 4 MG: 4 INJECTION, SOLUTION INTRAMUSCULAR; INTRAVENOUS at 01:06

## 2025-06-17 RX ADMIN — SUGAMMADEX 200 MG: 100 INJECTION, SOLUTION INTRAVENOUS at 01:06

## 2025-06-17 RX ADMIN — ONDANSETRON 4 MG: 2 INJECTION INTRAMUSCULAR; INTRAVENOUS at 01:06

## 2025-06-17 NOTE — PLAN OF CARE
Preop assessment complete. Brother to be called for pickup. All questions and concerns addressed. No apparent distress noted. Belongings placed in locker.

## 2025-06-17 NOTE — ANESTHESIA PROCEDURE NOTES
Intubation    Date/Time: 6/17/2025 1:01 PM    Performed by: Sean Valente CRNA  Authorized by: Vinh Johnston MD    Intubation:     Induction:  Intravenous    Intubated:  Postinduction    Mask Ventilation:  Easy mask    Attempts:  1    Attempted By:  CRNA    Method of Intubation:  Video laryngoscopy    Blade:  Butcher 3    Laryngeal View Grade: Grade IIA - cords partially seen      Difficult Airway Encountered?: No      Complications:  None    Airway Device:  Oral endotracheal tube    Airway Device Size:  7.5    Style/Cuff Inflation:  Cuffed    Inflation Amount (mL):  10    Tube secured:  20    Secured at:  The teeth    Placement Verified By:  Capnometry    Complicating Factors:  None    Findings Post-Intubation:  BS equal bilateral and atraumatic/condition of teeth unchanged

## 2025-06-17 NOTE — BRIEF OP NOTE
[facility] - [facility] (1st Fl)  Brief Operative Note    Surgery Date: 6/17/2025     Surgeons and Role:     * William Diaz MD - Primary     * Boby Gutierrez MD - Resident - Assisting     * Stefano Lucas MD - Resident - Assisting        Pre-op Diagnosis:  Bilateral kidney stones [N20.0]    Post-op Diagnosis:  Post-Op Diagnosis Codes:     * Bilateral kidney stones [N20.0]    Procedure(s) (LRB):  CYSTOSCOPY, WITH URETERAL STENT INSERTION (Left)  REMOVAL-STENT (Left)  REMOVAL, CALCULUS, URETER, URETEROSCOPIC (Left)  NEPHROSCOPY (Left)  URETEROSCOPY (Left)    Anesthesia: General    Operative Findings: small kidney stones removed with stone basketing     Estimated Blood Loss: * No values recorded between 6/17/2025 12:51 PM and 6/17/2025  2:03 PM *         Specimens:   Specimen (24h ago, onward)      None            * No specimens in log *        Discharge Note    OUTCOME: Patient tolerated treatment/procedure well without complication and is now ready for discharge.    DISPOSITION: Home or Self Care    FINAL DIAGNOSIS:  <principal problem not specified>    FOLLOWUP: In clinic    DISCHARGE INSTRUCTIONS:    Discharge Procedure Orders   Notify your health care provider if you experience any of the following:  temperature >100.4     Notify your health care provider if you experience any of the following:  persistent nausea and vomiting or diarrhea     Notify your health care provider if you experience any of the following:  severe uncontrolled pain     Notify your health care provider if you experience any of the following:  redness, tenderness, or signs of infection (pain, swelling, redness, odor or green/yellow discharge around incision site)     Activity as tolerated

## 2025-06-17 NOTE — DISCHARGE INSTRUCTIONS
Post Ureteroscopy Instructions  Do not strain to have a bowel movement  No strenuous exercise x 7 days  No driving while you are on narcotic pain medications or if you have a George catheter    You can expect:  To pass stone fragments if you had a stone procedure  Have pain when you void from your stent if you have a stent in place  See blood in your urine if you have a stent in place    If you have a catheter, please return to the ER if your catheter stops draining or you are having abdominal pain.    Call the doctor if:  Temperature is greater than 101F  Persistent vomiting and inability to keep food down  Inability to void if you do not have a catheter    You may remove your stent with strings on Monday, 6/23/2025.  You will follow up with Dr. Diaz in 3 months with a KUB and CTRSS prior.    Take Flomax (Tamsulosin) 0.4 mg once per day while your stent is in place.  You may take pyridium 200 mg up to 3 times per day for bladder irritation from the stent.  You may take Ditropan 5 mg up to 3 times per day for bladder spasms with stent.    If you have any questions or concerns during normal business hours, please call the urology clinic of your surgery.  If you are having an emergency after 5 pm or weekends, you may call [phone] and ask them to page the urology resident on call.

## 2025-06-17 NOTE — TRANSFER OF CARE
Anesthesia Transfer of Care Note    Patient: Jian Arrieta    Procedure(s) Performed: Procedure(s) (LRB):  CYSTOSCOPY, WITH URETERAL STENT INSERTION (Left)  REMOVAL-STENT (Left)  REMOVAL, CALCULUS, URETER, URETEROSCOPIC (Left)  NEPHROSCOPY (Left)  URETEROSCOPY (Left)    Patient location: PACU    Anesthesia Type: general    Transport from OR: Transported from OR on 6-10 L/min O2 by face mask with adequate spontaneous ventilation    Post pain: adequate analgesia    Post assessment: no apparent anesthetic complications and tolerated procedure well    Post vital signs: stable    Level of consciousness: awake and alert    Nausea/Vomiting: no nausea/vomiting    Complications: none    Transfer of care protocol was followed    Last vitals: Visit Vitals  /65 (BP Location: Left arm, Patient Position: Lying)   Pulse 71   Temp 37.5 °C (99.5 °F) (Temporal)   Resp 18   SpO2 99%

## 2025-06-17 NOTE — INTERVAL H&P NOTE
The patient has been examined and the H&P has been reviewed:    I concur with the findings and no changes have occurred since H&P was written.    Surgery risks, benefits and alternative options discussed and understood by patient/family.      Patient states he has been taking his Cipro for the past two days. Has held both ASA and Plavix for the past 3 days.     There are no hospital problems to display for this patient.

## 2025-06-17 NOTE — OP NOTE
[facility] - Surgery (1st Fl)  [facility]  Operative Note    Date: 06/17/2025    Pre-Op Diagnosis: left kidney stones    Patient Active Problem List    Diagnosis Date Noted    Gangrene 01/04/2025    CKD (chronic kidney disease) stage 4, GFR 15-29 ml/min 01/04/2025    Type 2 diabetes mellitus with left diabetic foot infection 01/03/2025    Gangrene of toe of left foot 11/26/2024    Critical limb ischemia of left lower extremity 11/26/2024    Stage 4 chronic kidney disease 02/23/2024    Chronic diastolic congestive heart failure 02/23/2024    Type 2 diabetes mellitus with stage 4 chronic kidney disease, with long-term current use of insulin 02/23/2024    Hydronephrosis 10/16/2023    Diabetic nephropathy associated with type 2 diabetes mellitus 10/16/2023    Nephrotic range proteinuria 10/16/2023    Venous stasis dermatitis of both lower extremities 10/13/2023    Insulin pump status 10/13/2023    Right flank pain 10/09/2023    Hematuria, microscopic 10/09/2023    Diabetes mellitus type 1, with complication, on long term insulin pump 06/07/2023    Pseudomonas aeruginosa infection 08/19/2022    MSSA infection, non-invasive 08/19/2022    Venous insufficiency of both lower extremities 06/14/2022    History of colon polyps 01/13/2022    Ureteral stone 06/30/2021    Hematoma of scrotum 06/21/2021    Bilateral kidney stones 05/27/2021    Obstruction of right ureteropelvic junction (UPJ) due to stone 05/24/2021    Bilateral hydrocele 05/24/2021    Vitamin D deficiency 04/07/2021    Hx of CABG 03/31/2021    Class 3 obesity 03/15/2021    Atherosclerosis of aorta 05/11/2020    Other cirrhosis of liver     Current use of long term anticoagulation 03/10/2020    Medically noncompliant 08/22/2019    Gastritis     Portal hypertension due to obstruction of extrahepatic portal vein 06/27/2019    Hepatic fibrosis 06/25/2019    Hypertensive emergency 05/06/2019    Hyperlipidemia associated with type 2 diabetes mellitus  05/06/2019    Paroxysmal atrial fibrillation 03/16/2019    Anemia 03/15/2019    Other ascites 03/13/2019    Type 2 diabetes mellitus with diabetic neuropathy, with long-term current use of insulin 02/04/2019    Hypoalbuminemia 02/04/2019    Lymphedema of both lower extremities 01/29/2019    Other chronic pancreatitis 01/28/2019    Pseudocyst of pancreas 01/28/2019    Aortic atherosclerosis     Onychomycosis of multiple toenails with type 2 diabetes mellitus and peripheral neuropathy 06/20/2017    Status post bariatric surgery 01/11/2016    Diabetic polyneuropathy associated with type 2 diabetes mellitus 09/02/2015    Sleep apnea 08/26/2015    Coronary artery disease due to calcified coronary lesion 05/08/2015    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 Left ureteroscopy with pyeloscopy  2. Stone basket extraction  3. Cystoscopy  4. Left ureteral stent replacement  5. Fluoro < 1 h       Specimen(s): None    Staff Surgeon: William Diaz MD      Assistant Surgeon: Stefano Lucas MD; Boby Gutierrez MD      Circulator: Katrina Sosa RN; Gladys Hinson RN; Yamini Eugene RN     Anesthesia: General endotracheal anesthesia    Indications: Jian Arrieta is a 69 y.o. male with a left kidney stone presenting for definitive stone management. He is pre-stented.    Findings:  1. Stone not visualized on  film.  2. No stone visualized in left ureter  3. Smaller stones seen in left kidney removed with stone basketing       Estimated Blood Loss: Minimal    Drains:   1. Left 6 Fr x 24 cm JJ ureteral stent with strings      Procedure in detail:  After risks, benefits, and possible complications were explained, the patient elected to undergo the procedure and informed consent was obtained. All questions were answered in the gulshan-operative area. The patient was transferred to the cystoscopy suite and placed in the supine  position. SCDs were applied and working. Anesthesia was administered. The patient was then placed in the dorsal lithotomy position and prepped and draped in the usual sterile fashion. Time out was performed, and gulshan-procedural antibiotics were confirmed.     The stone was not on  film. A rigid cystoscope in a 22 Fr sheath was introduced into the patient's urethra. This passed easily. The entire urethra was visualized which showed no strictures or masses. Cystoscopy revealed the ureteral orifices in the normal anatomic location bilaterally. There were no bladder tumors, no bladder stones and no diverticula seen.    The patient's left ureteral stent was grasped with alligator graspers and brought to the urethral meatus. A motion wire was passed through the stent and into the kidney with fluoroscopic confirmation. The stent was was removed keeping the wire in place.    An 8 Fr semirigid ureteroscope was passed into the patient's bladder alongside the wire under direct vision. It was then passed through the left ureteral orifice alongside the wire. No stones were visualized within the left ureter.       A stiff glide wire was inserted through the semirigid ureteroscope and into the kidney with fluoroscopic confirmation. The ureteroscope was removed keeping both wires in place.    A 10.7/12.7 Fr 45 cm ureteral access sheath was advanced over the stiff glide wire to the level of the renal pelvis under continuous fluoroscopy. This passed easily. The inner dilator and wire were removed, keeping the outer sheath in place. An 8 Fr flexible ureteroscope was then advanced through the access sheath and into the kidney.    Formal pyeloscopy was performed and a few small stones were noted throughout the kidney. An Ncircle basket was inserted per the scope. Stone fragments were then removed. Systematic pyeloscopy was repeated and no additional stone fragments were visualized all remaining stone fragments were deemed small  enough to pass spontaneously, <1 mm. The flexible ureteroscope and ureteral access sheath were removed, keeping the motion wire in place.    A 6 Fr x 24 cm JJ ureteral stent with strings was passed over the wire and up into the renal pelvis using fluoro. When the coil appeared to be in good position in the kidney and the radio-opaque marker of the pusher was at the inferior pubis, the wire was removed under continuous fluoro. Good coils were seen in the kidney and the bladder using fluoro.    The cystoscope was reinserted. The distal coil of the ureteral stent was observed within the bladder lumen. The bladder was drained and the cystoscope removed. The patient tolerated the procedure well and was transferred to the recovery room in stable condition.    Disposition:  The patient will be discharged home from PACU. He follow up with Dr. Diaz in 3 month with a KUB and CTRSS prior. He was given written instructions to self-remove his ureteral stent with strings on Monday, 6/23/2025.    Stefano Lucas MD

## 2025-06-17 NOTE — ANESTHESIA PREPROCEDURE EVALUATION
"[facility]  Pre-operative Anesthetic Evaluation    Jian Arrieta is a 69 y.o. male here for Procedure(s) (LRB):  URETEROSCOPY, WITH LASER LITHOTRIPSY (Left)  CYSTOSCOPY, WITH URETERAL STENT INSERTION (Left)    Ischemic coronary disease  Stable angina, medically managed by cardiology  Nitroglycerin PRN  No CP today  CRI, pending dialysis, has not had issues with hyperK, still makes urine  Appr NPO  Poorly controlled T2DM  Did well with identical procedure last week  Patient was consented for anesthesia, though was not interested in discussing specific risks, noting that he "is aware he is not the ideal candidate for anesthesia" but trusts us to do our job  Answered all of patients questions prior to proceeding    PMHx:  Problem List[1]    Echocardiogram (if on file):  Results for orders placed during the hospital encounter of 10/12/23    Echo    Interpretation Summary    Left Ventricle: The left ventricle is normal in size. Normal wall thickness. Septal motion is consistent with post-operative status. There is low normal systolic function with a visually estimated ejection fraction of 50 - 55%. There is normal diastolic function.    Right Ventricle: Normal right ventricular cavity size. Wall thickness is normal. Right ventricle wall motion  is normal. Systolic function is normal.    Pulmonary Artery: The estimated pulmonary artery systolic pressure is 21 mmHg.    IVC/SVC: Normal venous pressure at 3 mmHg.      Allergies:  Review of patient's allergies indicates:  No Known Allergies    Home Medications:  Current Outpatient Medications   Medication Instructions    acetaminophen (TYLENOL) 650 mg, Oral, Every 8 hours PRN    acetaminophen (TYLENOL) 500 mg, Oral, Every 6 hours PRN    allopurinoL (ZYLOPRIM) 100 mg    aspirin (ECOTRIN) 81 mg, Oral, Daily    blood pressure monitor Kit 1 kit, Misc.(Non-Drug; Combo Route), 2 times daily    blood-glucose sensor (DEXCOM G6 SENSOR) Sandi 1 each, Subcutaneous, Every 10 days "    blood-glucose transmitter (DEXCOM G6 TRANSMITTER) Sandi 1 each, Subcutaneous, Every 10 days    calcitRIOL (ROCALTROL) 0.25 mcg, Daily    ceFEPIme 1 g, Intravenous, Daily, End 1/8/25    ciprofloxacin HCl (CIPRO) 500 mg, Oral, Daily    clopidogreL (PLAVIX) 75 mg, Oral, Daily    dextrose (GLUCOSE GEL) 15 g, Once    glucagon (BAQSIMI) 3 mg/actuation Spry 1 each, Nasal, Daily PRN    HumaLOG KwikPen Insulin 5 Units, Subcutaneous, 3 times daily before meals PRN, This is emergency back up insulin to use with meals if OFF of insulin pump    insulin detemir U-100 (Levemir) 30 Units, Subcutaneous, Nightly, This is emergency back up insulin only if OFF of your insulin pump    insulin pump cart,automated,BT (OMNIPOD 5 G6 PODS, GEN 5,) Crtg INJECT 1 EACH INTO THE SKIN EVERY OTHER DAY.    isosorbide mononitrate (IMDUR) 30 mg, Oral, Daily    LYUMJEV U-100 INSULIN 80 Units, Subcutaneous, Continuous, Uses up to 80 units daily with omnipod 5 insulin pump as directed.    metoprolol succinate (TOPROL-XL) 50 mg, 2 times daily    nitroGLYCERIN (NITROSTAT) 0.4 mg, Every 5 min PRN    ondansetron (ZOFRAN) 4 mg, Every 8 hours PRN    oxybutynin (DITROPAN) 5 mg, Oral, 3 times daily    oxyCODONE-acetaminophen (PERCOCET) 5-325 mg per tablet 1 tablet, Oral, Every 6 hours PRN    rosuvastatin (CRESTOR) 20 mg, Daily    sevelamer carbonate (RENVELA) 800 mg, 3 times daily with meals    tamsulosin (FLOMAX) 0.4 mg, Oral, Daily    torsemide (DEMADEX) 20 mg, Oral, Daily       Surgical Hx:  Past Surgical History:   Procedure Laterality Date    ANGIOGRAPHY N/A 6/28/2019    Procedure: ANGIOGRAM-PV STENT;  Surgeon: Ewa Diagnostic Provider;  Location: [facility] OR;  Service: Radiology;  Laterality: N/A;    ANGIOPLASTY      total x5 stents    AORTOGRAPHY WITH SERIALOGRAPHY N/A 11/26/2024    Procedure: AORTOGRAM, WITH SERIALOGRAPHY;  Surgeon: Clinton Gibbs MD;  Location: [facility] CATH LAB/EP;  Service: Cardiology;  Laterality: N/A;    COLONOSCOPY N/A 10/6/2015     Procedure: COLONOSCOPY;  Surgeon: Shekhar Richards MD;  Location: [facility] (2ND FLR);  Service: Endoscopy;  Laterality: N/A;  BMI over 55/2nd floor case    5 day hold Plavix, Dr Kwadwo Arroyo    COLONOSCOPY N/A 5/13/2021    Procedure: COLONOSCOPY;  Surgeon: Huan Brumfield MD;  Location: [facility];  Service: Endoscopy;  Laterality: N/A;    CORONARY ANGIOGRAPHY Right 3/20/2019    Procedure: ANGIOGRAM, CORONARY ARTERY;  Surgeon: Bob Duque MD;  Location: [facility] CATH LAB;  Service: Cardiology;  Laterality: Right;    CORONARY ARTERY BYPASS GRAFT  2017    x3    CORONARY BYPASS GRAFT ANGIOGRAPHY  3/20/2019    Procedure: Bypass graft study;  Surgeon: Bob Duque MD;  Location: [facility] CATH LAB;  Service: Cardiology;;    CYST REMOVAL      CYSTOSCOPY Right 6/30/2021    Procedure: CYSTOSCOPY;  Surgeon: William Diaz MD;  Location: [facility] OR 1ST FLR;  Service: Urology;  Laterality: Right;    CYSTOSCOPY N/A 10/16/2023    Procedure: CYSTOSCOPY;  Surgeon: Chrystal Dias MD;  Location: [facility] OR 1ST FLR;  Service: Urology;  Laterality: N/A;    CYSTOSCOPY N/A 12/6/2023    Procedure: CYSTOSCOPY;  Surgeon: William Diaz MD;  Location: [facility] OR 1ST FLR;  Service: Urology;  Laterality: N/A;    CYSTOSCOPY N/A 6/10/2025    Procedure: CYSTOSCOPY;  Surgeon: William Diaz MD;  Location: [facility] OR 1ST FLR;  Service: Urology;  Laterality: N/A;    CYSTOSCOPY W/ URETERAL STENT PLACEMENT Right 6/16/2021    Procedure: CYSTOSCOPY, WITH URETERAL STENT INSERTION;  Surgeon: William Diaz MD;  Location: [facility] OR 2ND FLR;  Service: Urology;  Laterality: Right;  FLUORO LESS THAN 1 HOUR    ENDOSCOPIC ULTRASOUND OF UPPER GASTROINTESTINAL TRACT N/A 2/26/2020    Procedure: ULTRASOUND, UPPER GI TRACT, ENDOSCOPIC;  Surgeon: Robbie Yang MD;  Location: [facility] ENDO;  Service: Endoscopy;  Laterality: N/A;    ESOPHAGOGASTRODUODENOSCOPY N/A 7/8/2019    Procedure: ESOPHAGOGASTRODUODENOSCOPY (EGD);  Surgeon: Huan Brumfield MD;  Location: [facility]  ENDO;  Service: Endoscopy;  Laterality: N/A;    ESOPHAGOGASTRODUODENOSCOPY N/A 5/13/2021    Procedure: EGD (ESOPHAGOGASTRODUODENOSCOPY);  Surgeon: Huan Brumfield MD;  Location: [facility] ENDO;  Service: Endoscopy;  Laterality: N/A;    EXCISION OF HYDROCELE Bilateral 6/16/2021    Procedure: HYDROCELE REPAIR;  Surgeon: William Diaz MD;  Location: [facility] OR 2ND FLR;  Service: Urology;  Laterality: Bilateral;  2 HOURS    EXTRACTION - STONE Right 12/6/2023    Procedure: EXTRACTION - STONE;  Surgeon: William Diaz MD;  Location: [facility] OR 1ST FLR;  Service: Urology;  Laterality: Right;    FLUOROSCOPY N/A 6/16/2021    Procedure: FLUOROSCOPY;  Surgeon: William Diaz MD;  Location: [facility] OR 2ND FLR;  Service: Urology;  Laterality: N/A;    GASTRECTOMY      INSERTION OF DIALYSIS CATHETER N/A 5/17/2019    Procedure: pleurx;  Surgeon: [facility] Diagnostic Provider;  Location: [facility] OR 2ND FLR;  Service: General;  Laterality: N/A;  Room 188/[facility]    KNEE ARTHROSCOPY      LAPAROSCOPIC CHOLECYSTECTOMY N/A 5/4/2020    Procedure: CHOLECYSTECTOMY, LAPAROSCOPIC;  Surgeon: SON Rowe MD;  Location: [facility] OR;  Service: General;  Laterality: N/A;    LASER LITHOTRIPSY N/A 6/30/2021    Procedure: LITHOTRIPSY, USING LASER;  Surgeon: William Diaz MD;  [facility];  Service: Urology;  Laterality: N/A;    LIVER BIOPSY N/A 5/4/2020    Procedure: BIOPSY, LIVER, Laproscopic ;  Surgeon: SON Rowe MD;  Location: [facility] OR;  Service: General;  Laterality: N/A;    NEPHROSCOPY Left 6/10/2025    Procedure: NEPHROSCOPY;  Surgeon: William Diaz MD;  Location: [facility] OR 1ST FLR;  Service: Urology;  Laterality: Left;    perianal surgery      perianal cyst removed    PLACEMENT-STENT Left 6/10/2025    Procedure: PLACEMENT-STENT;  Surgeon: William Diaz MD;  [facility];  Service: Urology;  Laterality: Left;    PYELOSCOPY Right 6/30/2021    Procedure: PYELOSCOPY;  Surgeon: William Diaz MD;  Location: [facility] OR [facility]  FLR;  Service: Urology;  Laterality: Right;    PYELOSCOPY Right 10/16/2023    Procedure: PYELOSCOPY;  Surgeon: Chrystal Dias MD;  Location: [facility] OR [facility] FLR;  Service: Urology;  Laterality: Right;    PYELOSCOPY Right 12/6/2023    Procedure: PYELOSCOPY;  Surgeon: William Diaz MD;  Location: [facility] OR [facility] FLR;  Service: Urology;  Laterality: Right;    REMOVAL-STENT Right 12/6/2023    Procedure: REMOVAL-STENT;  Surgeon: William Diaz MD;  Location: [facility] OR 1ST FLR;  Service: Urology;  Laterality: Right;    REPLACEMENT OF STENT Right 6/30/2021    Procedure: REPLACEMENT, STENT;  Surgeon: William Diaz MD;  [facility];  Service: Urology;  Laterality: Right;    RETROGRADE PYELOGRAPHY Right 10/16/2023    Procedure: PYELOGRAM, RETROGRADE;  Surgeon: Chrystal Dias MD;  [facility];  Service: Urology;  Laterality: Right;    RETROGRADE PYELOGRAPHY Right 12/6/2023    Procedure: PYELOGRAM, RETROGRADE;  Surgeon: William Diaz MD;  [facility];  Service: Urology;  Laterality: Right;    RETROGRADE PYELOGRAPHY Right 6/10/2025    Procedure: PYELOGRAM, RETROGRADE;  Surgeon: William Daiz MD;  [facility];  Service: Urology;  Laterality: Right;    TOE AMPUTATION Left 1/3/2025    Procedure: AMPUTATION, TOE;  Surgeon: Saavnah Felton DPM;  Location: [facility];  Service: Podiatry;  Laterality: Left;    URETERAL STENT PLACEMENT Right 10/16/2023    Procedure: INSERTION, STENT, URETER;  Surgeon: Chrystal Dias MD;  [facility];  Service: Urology;  Laterality: Right;    URETEROSCOPIC REMOVAL OF URETERIC CALCULUS Left 6/10/2025    Procedure: REMOVAL, CALCULUS, URETER, URETEROSCOPIC;  Surgeon: William Diaz MD;  Location: [facility] OR [address] [place] [address];  Service: Urology;  Laterality: Left;    URETEROSCOPY Right 6/30/2021    Procedure: URETEROSCOPY FLEXIBLE URETEROSCOPE;  Surgeon: William Diaz MD;  Location: [facility] OR [address] [place] [address];  Service: Urology;   "Laterality: Right;    URETEROSCOPY Right 10/16/2023    Procedure: URETEROSCOPY;  Surgeon: Chrystal Dias MD;  Location: [facility] OR [address] [place] [address];  Service: Urology;  Laterality: Right;    URETEROSCOPY Right 2023    Procedure: URETEROSCOPY;  Surgeon: William Diaz MD;  Location: [facility] OR [address] [place] [address];  Service: Urology;  Laterality: Right;    URETEROSCOPY, WITH LASER LITHOTRIPSY Left 6/10/2025    Procedure: URETEROSCOPY, WITH LASER LITHOTRIPSY;  Surgeon: William Diaz MD;  Location: [facility] OR [address] [place] [address];  Service: Urology;  Laterality: Left;       Tobacco/EtOH:  Social History     Tobacco Use    Smoking status: Former     Current packs/day: 0.00     Average packs/day: 2.0 packs/day for 30.0 years (60.0 ttl pk-yrs)     Types: Cigarettes     Start date: 1975     Quit date: 2005     Years since quittin.3    Smokeless tobacco: Never   Substance Use Topics    Alcohol use: No     Comment: started ~, reports 1 shot daily, max 3 shots daily, vague about alcohol consumption. Last drink 2018       Labs:  CBC: No results for input(s): "WBC", "RBC", "HGB", "HCT", "PLT", "MCV", "MCH", "MCHC" in the last 72 hours.    CMP: No results for input(s): "NA", "K", "CL", "CO2", "BUN", "CREATININE", "GLU", "MG", "PHOS", "CALCIUM", "ALBUMIN", "PROT", "ALKPHOS", "ALT", "AST", "BILITOT" in the last 72 hours.    Coags: No results for input(s): "PT", "INR", "PROTIME", "APTT" in the last 72 hours.    EKG:  Results for orders placed or performed during the hospital encounter of 24   EKG 12-lead    Collection Time: 24  9:55 AM   Result Value Ref Range    QRS Duration 150 ms    [facility] QTC Calculation 521 ms    Narrative    Test Reason :    Vent. Rate :  80 BPM     Atrial Rate :  80 BPM     P-R Int : 194 ms          QRS Dur : 150 ms      QT Int : 452 ms       P-R-T Axes :  61 -60  95 degrees    QTcB Int : 521 ms    Normal sinus rhythm  Possible Left atrial enlargement  Left axis deviation  Left bundle branch block  Abnormal " ECG  When compared with ECG of 26-Nov-2024 09:47,  The axis Shifted right  Confirmed by Roly Marley ([id number]) on 11/26/2024 5:32:49 PM    Referred By: AAAREFERRAL SELF           Confirmed By: Roly Mcneal       Diagnostic Studies (pertinent only):        Pre-op Assessment    I have reviewed the Patient Summary Reports.     I have reviewed the Nursing Notes. I have reviewed the NPO Status.   I have reviewed the Medications.     Review of Systems  Anesthesia Hx:  No problems with previous Anesthesia   History of prior surgery of interest to airway management or planning:            Denies Personal Hx of Anesthesia complications.                    Social:  Former Smoker       Hematology/Oncology:  Hematology Normal   Oncology Normal                                   EENT/Dental:  EENT/Dental Normal           Cardiovascular:  Exercise tolerance: poor   Hypertension   CAD   CABG/stent   Angina, with exertion CHF      Denies CHURCH.  ECG has been reviewed.                            Pulmonary:    Denies COPD.     Sleep Apnea                Renal/:  Chronic Renal Disease, ESRD                Hepatic/GI:      Liver Disease,               Musculoskeletal:  Musculoskeletal Normal                Neurological:    Neuromuscular Disease,                                   Endocrine:  Diabetes, poorly controlled, type 2           Dermatological:  Skin Normal    Psych:  Psychiatric History                  Physical Exam  General: Well nourished    Airway:  Mallampati: III   Mouth Opening: Normal  TM Distance: Normal  Tongue: Normal  Neck ROM: Normal ROM    Dental:  Dentures        Anesthesia Plan  Type of Anesthesia, risks & benefits discussed:    Anesthesia Type: Gen ETT  Intra-op Monitoring Plan: Standard ASA Monitors  Post Op Pain Control Plan: multimodal analgesia and IV/PO Opioids PRN  Induction:  IV  Airway Plan: Video, Post-Induction  Informed Consent: Informed consent signed with the Patient and all parties  understand the risks and agree with anesthesia plan.  All questions answered.   ASA Score: 4  Day of Surgery Review of History & Physical: H&P Update referred to the surgeon/provider.    Ready For Surgery From Anesthesia Perspective.     .           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), managed as type 1    Sleep apnea    Diabetic polyneuropathy associated with type 2 diabetes mellitus    Status post bariatric surgery    Onychomycosis of multiple toenails with type 2 diabetes mellitus and peripheral neuropathy    Aortic atherosclerosis    Other chronic pancreatitis    Pseudocyst of pancreas    Lymphedema of both lower extremities    Type 2 diabetes mellitus with diabetic neuropathy, with long-term current use of insulin    Hypoalbuminemia    Other ascites    Anemia    Paroxysmal atrial fibrillation    Hypertensive emergency    Hyperlipidemia associated with type 2 diabetes mellitus    Hepatic fibrosis    Portal hypertension due to obstruction of extrahepatic portal vein    Gastritis    Medically noncompliant    Current use of long term anticoagulation    Other cirrhosis of liver    Atherosclerosis of aorta    Class 3 obesity    Hx of CABG    Vitamin D deficiency    Obstruction of right ureteropelvic junction (UPJ) due to stone    Bilateral hydrocele    Bilateral kidney stones    Hematoma of scrotum    Ureteral stone    History of colon polyps    Venous insufficiency of both lower extremities    Pseudomonas aeruginosa infection    MSSA infection, non-invasive    Diabetes mellitus type 1, with complication, on long term insulin pump    Right flank pain    Hematuria, microscopic    Venous stasis dermatitis of both lower extremities    Insulin pump status    Hydronephrosis    Diabetic nephropathy associated with type 2 diabetes mellitus    Nephrotic range proteinuria    Stage 4 chronic kidney disease     Chronic diastolic congestive heart failure    Type 2 diabetes mellitus with stage 4 chronic kidney disease, with long-term current use of insulin    Gangrene of toe of left foot    Critical limb ischemia of left lower extremity    Type 2 diabetes mellitus with left diabetic foot infection    Gangrene    CKD (chronic kidney disease) stage 4, GFR 15-29 ml/min

## 2025-06-18 LAB — POCT GLUCOSE: 138 MG/DL (ref 70–110)

## 2025-06-18 NOTE — ANESTHESIA POSTPROCEDURE EVALUATION
Anesthesia Post Evaluation    Patient: Jian Arrieta    Procedure(s) Performed: Procedure(s) (LRB):  CYSTOSCOPY, WITH URETERAL STENT INSERTION (Left)  REMOVAL-STENT (Left)  REMOVAL, CALCULUS, URETER, URETEROSCOPIC (Left)  NEPHROSCOPY (Left)  URETEROSCOPY (Left)    Final Anesthesia Type: general      Patient location during evaluation: [facility]  Patient participation: Yes- Able to Participate  Level of consciousness: awake and alert and oriented  Post-procedure vital signs: reviewed and stable  Pain management: adequate  Airway patency: patent  BERHANE mitigation strategies: Multimodal analgesia, Extubation while patient is awake, Verification of full reversal of neuromuscular block and Extubation and recovery carried out in lateral, semiupright, or other nonsupine position  PONV status at discharge: No PONV  Anesthetic complications: no      Cardiovascular status: blood pressure returned to baseline and hemodynamically stable  Respiratory status: unassisted, spontaneous ventilation and room air  Hydration status: euvolemic  Follow-up not needed.              Vitals Value Taken Time   /64 06/17/25 15:31   Temp 36.7 °C (98.1 °F) 06/17/25 14:00   Pulse 81 06/17/25 15:45   Resp 11 06/17/25 15:01   SpO2 100 % 06/17/25 15:45   Vitals shown include unfiled device data.      No case tracking events are documented in the log.      Pain/Jason Score: Jason Score: 9 (6/17/2025  2:15 PM)

## 2025-06-19 ENCOUNTER — TELEPHONE (OUTPATIENT)
Dept: UROLOGY | Facility: CLINIC | Age: 70
End: 2025-06-19
Payer: MEDICARE

## 2025-06-19 VITALS
RESPIRATION RATE: 22 BRPM | SYSTOLIC BLOOD PRESSURE: 131 MMHG | DIASTOLIC BLOOD PRESSURE: 64 MMHG | HEART RATE: 81 BPM | OXYGEN SATURATION: 100 % | TEMPERATURE: 98 F

## 2025-06-19 NOTE — TELEPHONE ENCOUNTER
----- Message from William Min MD sent at 6/19/2025  5:00 PM CDT -----  Regarding: RE: surgery  Already talk to him after his surgery.  All stones removed.  ----- Message -----  From: Anna Singh LPN  Sent: 6/19/2025   4:00 PM CDT  To: William Min MD  Subject: FW: surgery                                      Were you able to remove the stone?  ----- Message -----  From: Shila Robles MA  Sent: 6/19/2025   3:46 PM CDT  To: Anna Singh LPN  Subject: surgery                                          Did post op phone call, he has a lot of questions about what didDr min found out after surgery, He would like to speak with Dr Min.

## 2025-06-20 RX ORDER — OXYBUTYNIN CHLORIDE 5 MG/1
5 TABLET ORAL 3 TIMES DAILY
Qty: 42 TABLET | Refills: 0 | Status: CANCELLED | OUTPATIENT
Start: 2025-06-20 | End: 2025-07-04

## 2025-06-24 RX ORDER — OXYBUTYNIN CHLORIDE 5 MG/1
5 TABLET ORAL 3 TIMES DAILY
Qty: 42 TABLET | Refills: 0 | Status: CANCELLED | OUTPATIENT
Start: 2025-06-20 | End: 2025-07-04

## 2025-07-03 ENCOUNTER — LAB VISIT (OUTPATIENT)
Dept: LAB | Facility: HOSPITAL | Age: 70
End: 2025-07-03
Payer: MEDICARE

## 2025-07-03 DIAGNOSIS — N40.1 BPH WITH LOWER URINARY TRACT SYMPTOMS WITHOUT URINARY OBSTRUCTION: ICD-10-CM

## 2025-07-03 DIAGNOSIS — N17.9 ACUTE KIDNEY FAILURE, UNSPECIFIED: Primary | ICD-10-CM

## 2025-07-03 LAB
ALBUMIN SERPL BCP-MCNC: 3.2 G/DL (ref 3.5–5.2)
ANION GAP (OHS): 11 MMOL/L (ref 8–16)
BUN SERPL-MCNC: 52 MG/DL (ref 8–23)
CALCIUM SERPL-MCNC: 7.4 MG/DL (ref 8.7–10.5)
CHLORIDE SERPL-SCNC: 104 MMOL/L (ref 95–110)
CO2 SERPL-SCNC: 21 MMOL/L (ref 23–29)
CREAT SERPL-MCNC: 4.4 MG/DL (ref 0.5–1.4)
GFR SERPLBLD CREATININE-BSD FMLA CKD-EPI: 14 ML/MIN/1.73/M2
GLUCOSE SERPL-MCNC: 279 MG/DL (ref 70–110)
PHOSPHATE SERPL-MCNC: 7 MG/DL (ref 2.7–4.5)
POTASSIUM SERPL-SCNC: 4.6 MMOL/L (ref 3.5–5.1)
PSA SERPL-MCNC: 1.47 NG/ML
SODIUM SERPL-SCNC: 136 MMOL/L (ref 136–145)

## 2025-07-03 PROCEDURE — 80069 RENAL FUNCTION PANEL: CPT

## 2025-07-03 PROCEDURE — 84153 ASSAY OF PSA TOTAL: CPT

## 2025-07-03 PROCEDURE — 36415 COLL VENOUS BLD VENIPUNCTURE: CPT | Mod: PO

## 2025-07-08 RX ORDER — OXYBUTYNIN CHLORIDE 5 MG/1
5 TABLET ORAL 3 TIMES DAILY
Qty: 42 TABLET | Refills: 0 | Status: CANCELLED | OUTPATIENT
Start: 2025-07-08 | End: 2025-07-22

## 2025-07-09 RX ORDER — OXYBUTYNIN CHLORIDE 5 MG/1
5 TABLET ORAL 3 TIMES DAILY
Qty: 42 TABLET | Refills: 0 | Status: CANCELLED | OUTPATIENT
Start: 2025-07-08 | End: 2025-07-22

## 2025-08-02 ENCOUNTER — OFFICE VISIT (OUTPATIENT)
Dept: URGENT CARE | Facility: CLINIC | Age: 70
End: 2025-08-02
Payer: MEDICARE

## 2025-08-02 VITALS
BODY MASS INDEX: 39.24 KG/M2 | WEIGHT: 250 LBS | SYSTOLIC BLOOD PRESSURE: 109 MMHG | RESPIRATION RATE: 16 BRPM | OXYGEN SATURATION: 100 % | HEIGHT: 67 IN | TEMPERATURE: 98 F | HEART RATE: 109 BPM | DIASTOLIC BLOOD PRESSURE: 70 MMHG

## 2025-08-02 DIAGNOSIS — H04.302 INFECTION OF LEFT TEAR DUCT: ICD-10-CM

## 2025-08-02 DIAGNOSIS — L03.211 CELLULITIS OF FACE: Primary | ICD-10-CM

## 2025-08-02 PROCEDURE — 99213 OFFICE O/P EST LOW 20 MIN: CPT | Mod: 25,S$GLB,, | Performed by: FAMILY MEDICINE

## 2025-08-02 PROCEDURE — 96372 THER/PROPH/DIAG INJ SC/IM: CPT | Mod: S$GLB,,, | Performed by: FAMILY MEDICINE

## 2025-08-02 RX ORDER — CIPROFLOXACIN HYDROCHLORIDE 3 MG/ML
1 SOLUTION/ DROPS OPHTHALMIC EVERY 4 HOURS
Qty: 5 ML | Refills: 0 | Status: SHIPPED | OUTPATIENT
Start: 2025-08-02 | End: 2025-08-05

## 2025-08-02 RX ORDER — LIDOCAINE HYDROCHLORIDE 10 MG/ML
1 INJECTION, SOLUTION INFILTRATION; PERINEURAL
Status: COMPLETED | OUTPATIENT
Start: 2025-08-02 | End: 2025-08-02

## 2025-08-02 RX ORDER — ERYTHROMYCIN 5 MG/G
OINTMENT OPHTHALMIC EVERY 8 HOURS
Qty: 3.5 G | Refills: 0 | Status: SHIPPED | OUTPATIENT
Start: 2025-08-02

## 2025-08-02 RX ORDER — CEFTRIAXONE 500 MG/1
500 INJECTION, POWDER, FOR SOLUTION INTRAMUSCULAR; INTRAVENOUS ONCE
Status: COMPLETED | OUTPATIENT
Start: 2025-08-02 | End: 2025-08-02

## 2025-08-02 RX ORDER — CEPHALEXIN 500 MG/1
500 CAPSULE ORAL EVERY 8 HOURS
Qty: 30 CAPSULE | Refills: 0 | Status: SHIPPED | OUTPATIENT
Start: 2025-08-02

## 2025-08-02 RX ADMIN — CEFTRIAXONE 500 MG: 500 INJECTION, POWDER, FOR SOLUTION INTRAMUSCULAR; INTRAVENOUS at 12:08

## 2025-08-02 RX ADMIN — LIDOCAINE HYDROCHLORIDE 1 ML: 10 INJECTION, SOLUTION INFILTRATION; PERINEURAL at 12:08

## 2025-08-02 NOTE — PROGRESS NOTES
"Subjective:      Patient ID: Jian Arrieta is a 69 y.o. male.    Vitals:  height is 5' 7" (1.702 m) and weight is 113.4 kg (250 lb). His oral temperature is 97.6 °F (36.4 °C). His blood pressure is 109/70 and his pulse is 109. His respiration is 16 and oxygen saturation is 100%.     Chief Complaint: Eye Problem    This is a 69 y.o. male who presents today with a chief complaint of R under eye swelling that started 2 days ago. Pt tried OTC eye drops and had no relief. Denies blurry and double vision       Eye Problem   The right eye is affected. This is a new problem. The current episode started in the past 7 days. The problem occurs constantly. The problem has been unchanged. There was no injury mechanism. The pain is at a severity of 6/10. The pain is mild. There is No known exposure to pink eye. He Does not wear contacts. He has tried eye drops for the symptoms. The treatment provided no relief.       Skin:  Positive for erythema.  skin: erythematous and hard  Objective:     Physical Exam   Constitutional: He is oriented to person, place, and time. He appears ill. He appears distressed.   HENT:   Head: Normocephalic and atraumatic.   Ears:   Right Ear: Tympanic membrane, external ear and ear canal normal.   Left Ear: Tympanic membrane, external ear and ear canal normal.   Nose: No rhinorrhea or congestion.   Eyes: Conjunctivae and lids are normal. Pupils are equal, round, and reactive to light. Right eye exhibits no discharge. Left eye exhibits discharge. Right eye exhibits normal extraocular motion. Left eye exhibits normal extraocular motion.     Extraocular movement intact      Comments: Swelling and redness under the left eye with pus discharge coming from the tear duct on the left   Neck: Neck supple.   Cardiovascular: Normal rate, regular rhythm, normal heart sounds and normal pulses.   No murmur heard.  Pulmonary/Chest: Effort normal and breath sounds normal. No respiratory distress. He has no rhonchi. "   Abdominal: Normal appearance and bowel sounds are normal. Soft. flat abdomen   Musculoskeletal: Normal range of motion.         General: Normal range of motion.   Neurological: no focal deficit. He is alert and oriented to person, place, and time. He displays no weakness. No cranial nerve deficit or sensory deficit. Coordination normal.   Skin: Skin is warm and dry. Capillary refill takes less than 2 seconds. erythema No bruising and No lesion   Psychiatric: His behavior is normal. Mood, judgment and thought content normal.   Nursing note and vitals reviewed.    Assessment:     Plan:   1. Cellulitis of face  - cefTRIAXone injection 500 mg  - LIDOcaine HCL 10 mg/ml (1%) injection 1 mL  - cephALEXin (KEFLEX) 500 MG capsule; Take 1 capsule (500 mg total) by mouth every 8 (eight) hours.  Dispense: 30 capsule; Refill: 0    2. Infection of left tear duct  - erythromycin (ROMYCIN) ophthalmic ointment; Place into both eyes every 8 (eight) hours.  Dispense: 3.5 g; Refill: 0  - ciprofloxacin HCl (CILOXAN) 0.3 % ophthalmic solution; Place 1 drop into the left eye every 4 (four) hours. for 3 days  Dispense: 5 mL; Refill: 0   Follow up with eye doctor if not any better

## 2025-08-07 ENCOUNTER — LAB VISIT (OUTPATIENT)
Dept: LAB | Facility: HOSPITAL | Age: 70
End: 2025-08-07
Attending: STUDENT IN AN ORGANIZED HEALTH CARE EDUCATION/TRAINING PROGRAM
Payer: MEDICARE

## 2025-08-07 DIAGNOSIS — I12.0 BENIGN HYPERTENSION WITH END-STAGE RENAL DISEASE: Primary | ICD-10-CM

## 2025-08-07 DIAGNOSIS — N18.6 BENIGN HYPERTENSION WITH END-STAGE RENAL DISEASE: Primary | ICD-10-CM

## 2025-08-07 LAB
ALBUMIN SERPL BCP-MCNC: 3.2 G/DL (ref 3.5–5.2)
ANION GAP (OHS): 15 MMOL/L (ref 8–16)
BUN SERPL-MCNC: 65 MG/DL (ref 8–23)
CALCIUM SERPL-MCNC: 8.1 MG/DL (ref 8.7–10.5)
CHLORIDE SERPL-SCNC: 100 MMOL/L (ref 95–110)
CO2 SERPL-SCNC: 22 MMOL/L (ref 23–29)
CREAT SERPL-MCNC: 6.2 MG/DL (ref 0.5–1.4)
GFR SERPLBLD CREATININE-BSD FMLA CKD-EPI: 9 ML/MIN/1.73/M2
GLUCOSE SERPL-MCNC: 337 MG/DL (ref 70–110)
PHOSPHATE SERPL-MCNC: 7.7 MG/DL (ref 2.7–4.5)
POTASSIUM SERPL-SCNC: 4.8 MMOL/L (ref 3.5–5.1)
PTH-INTACT SERPL-MCNC: 134.4 PG/ML (ref 9–77)
SODIUM SERPL-SCNC: 137 MMOL/L (ref 136–145)

## 2025-08-07 PROCEDURE — 80069 RENAL FUNCTION PANEL: CPT

## 2025-08-07 PROCEDURE — 83970 ASSAY OF PARATHORMONE: CPT

## 2025-08-07 PROCEDURE — 36415 COLL VENOUS BLD VENIPUNCTURE: CPT | Mod: PO

## 2025-08-26 ENCOUNTER — OFFICE VISIT (OUTPATIENT)
Dept: URGENT CARE | Facility: CLINIC | Age: 70
End: 2025-08-26
Payer: MEDICARE

## 2025-08-26 VITALS
OXYGEN SATURATION: 95 % | BODY MASS INDEX: 39.24 KG/M2 | RESPIRATION RATE: 16 BRPM | HEART RATE: 77 BPM | WEIGHT: 250 LBS | SYSTOLIC BLOOD PRESSURE: 104 MMHG | DIASTOLIC BLOOD PRESSURE: 67 MMHG | HEIGHT: 67 IN | TEMPERATURE: 99 F

## 2025-08-26 DIAGNOSIS — S01.81XA LACERATION OF FACE WITHOUT COMPLICATION, INITIAL ENCOUNTER: Primary | ICD-10-CM

## 2025-08-26 DIAGNOSIS — M54.9 BACK PAIN, UNSPECIFIED BACK LOCATION, UNSPECIFIED BACK PAIN LATERALITY, UNSPECIFIED CHRONICITY: ICD-10-CM

## 2025-08-26 PROCEDURE — 99213 OFFICE O/P EST LOW 20 MIN: CPT | Mod: S$GLB,,, | Performed by: FAMILY MEDICINE

## 2025-08-26 RX ORDER — BUMETANIDE 2 MG/1
2 TABLET ORAL
COMMUNITY
Start: 2025-08-25 | End: 2025-09-24

## (undated) DEVICE — GOWN SMARTGOWN LVL4 X-LONG XL

## (undated) DEVICE — PAD CAST SPECIALIST STRL 4

## (undated) DEVICE — GAUZE FLUFF XXLG 36X36 2 PLY

## (undated) DEVICE — PACK PERI/GYN OPTIMA

## (undated) DEVICE — SOL NACL IRR 3000ML

## (undated) DEVICE — SHEATH URET FLEXOR 12FR 45CM

## (undated) DEVICE — SUT VICRYL PLUS 4-0 PS2 27

## (undated) DEVICE — SEE MEDLINE ITEM 146417

## (undated) DEVICE — GLOVE SURG BIOGEL LATEX SZ 7.5

## (undated) DEVICE — TUBING SET ANGIASSIST MED GAS

## (undated) DEVICE — ADAPTER HOSE 10FT 8MM

## (undated) DEVICE — SPONGE COTTON TRAY 4X4IN

## (undated) DEVICE — SEE MEDLINE ITEM 156894

## (undated) DEVICE — EXTRACTOR TIPLESS 2.4FRX1115CM

## (undated) DEVICE — SET IRR URLGY 2LINE UNIV SPIKE

## (undated) DEVICE — CLIPPER BLADE MOD 4406 (CAREF)

## (undated) DEVICE — GUIDEWIRE EMERALD .035IN 260CM

## (undated) DEVICE — APPLIER CLIP ENDO LIGAMAX 5MM

## (undated) DEVICE — DRAPE ANGIO BRACH 38X44IN

## (undated) DEVICE — CATH DXTERITY JL40 100CM 6FR

## (undated) DEVICE — DRESSING GAUZE XEROFORM 5X9

## (undated) DEVICE — UNDERGLOVES BIOGEL PI SZ 7 LF

## (undated) DEVICE — TRAY CYSTO BASIN OMC

## (undated) DEVICE — BANDAGE MATRIX HK LOOP 4IN 5YD

## (undated) DEVICE — SOL IRRI STRL WATER 1000ML

## (undated) DEVICE — CATH GLIDECATH PV MLTCRV 4FR

## (undated) DEVICE — PENCIL SMK EVAC CONNECTOR 10FT

## (undated) DEVICE — COVER PROBE US 5.5X58L NON LTX

## (undated) DEVICE — SOL IRR NACL .9% 3000ML

## (undated) DEVICE — BLADE SURG #15 CARBON STEEL

## (undated) DEVICE — ADHESIVE DERMABOND ADVANCED

## (undated) DEVICE — TRAY MINOR GEN SURG

## (undated) DEVICE — GLIDESHEATH SLENDER SS 5FR10CM

## (undated) DEVICE — STOCKINET TUBULAR 1 PLY 6X60IN

## (undated) DEVICE — SWAB CULTURETTE SINGLE

## (undated) DEVICE — GUIDEWIRE SUPRA CORE 035 190CM

## (undated) DEVICE — SUT 0 VICRYL / UR6 (J603)

## (undated) DEVICE — PACK CYSTOSCOPY III SIRUS

## (undated) DEVICE — UNDERGLOVES BIOGEL PI SIZE 7.5

## (undated) DEVICE — SYR 10CC LUER LOCK

## (undated) DEVICE — GUIDEWIRE STR TIP HIWIRE 150CM

## (undated) DEVICE — DEVICE MYNX GRIP 6/7F

## (undated) DEVICE — TRAY CYSTO BASIN

## (undated) DEVICE — SEE MEDLINE ITEM 146313

## (undated) DEVICE — GUIDE WIRE MOTION .035 X 150CM

## (undated) DEVICE — SOL NACL 0.9% IV INJ 1000ML

## (undated) DEVICE — CATH MPA2 INFINITI 4FR 100CM

## (undated) DEVICE — CATH IMPULSE 5FR PIGTAIL 125CM

## (undated) DEVICE — PACK EXTREMITY KENNER

## (undated) DEVICE — DRAIN PENROSE XRAY 12 X 1/4 ST

## (undated) DEVICE — SUT VCRL ENDOLOOP 0 18 VIOL

## (undated) DEVICE — KIT CUSTOM MANIFOLD

## (undated) DEVICE — SYR ONLY LUER LOCK 20CC

## (undated) DEVICE — GLOVE SURGICAL LATEX SZ 7

## (undated) DEVICE — SEE MEDLINE ITEM 157181

## (undated) DEVICE — GUIDEWIRE EMERALD 150CM PTFE

## (undated) DEVICE — UNDERGLOVE BIOGEL PI SZ 6.5 LF

## (undated) DEVICE — COVER LIGHT HANDLE 80/CA

## (undated) DEVICE — NDL HYPO REG 25G X 1 1/2

## (undated) DEVICE — NDL HYPDRM 23X15

## (undated) DEVICE — PAD DEFIB CADENCE ADULT R2

## (undated) DEVICE — WIRE GUIDE SAFE-T-J .035 260CM

## (undated) DEVICE — HEMOSTAT VASC BAND X-LONG 29CM

## (undated) DEVICE — LUBRICANT SURGILUBE 2 OZ

## (undated) DEVICE — FIBER MOSES 200 DFL

## (undated) DEVICE — SHEATH FLEXOR URETERAL 45CM

## (undated) DEVICE — KIT PROBE COVER WITH GEL

## (undated) DEVICE — SEE MEDLINE ITEM 154981

## (undated) DEVICE — ELECTRODE NEEDLE 2.8IN

## (undated) DEVICE — BAG TISSUE RETRIEVAL 225ML

## (undated) DEVICE — SUT MONOCRYL 3-0 PS-2 UND

## (undated) DEVICE — PACK CYSTO

## (undated) DEVICE — ELECTRODE REM PLYHSV RETURN 9

## (undated) DEVICE — SHEATH INTRODUCER 6FR 11CM

## (undated) DEVICE — IRRIGATOR ENDOSCOPY DISP.

## (undated) DEVICE — BRIEF MESH LARGE

## (undated) DEVICE — MANIFOLD 4 PORT

## (undated) DEVICE — PAD PREP CUFFED NS 24X48IN

## (undated) DEVICE — STRIP PACKING ANTIMIC 1/4X1

## (undated) DEVICE — TROCAR ENDOPATH XCEL 12X100MM

## (undated) DEVICE — DRAPE STERI INSTRUMENT 1018

## (undated) DEVICE — SHEATH INTRODUCER 4FR 11CM

## (undated) DEVICE — SEE MEDLINE ITEM 152622

## (undated) DEVICE — DRAPE INCISE IOBAN 2 23X23IN

## (undated) DEVICE — URETEROSCOPE FLX 1.2X650X3.1MM

## (undated) DEVICE — BANDAGE ESMARK ELASTIC ST 4X9

## (undated) DEVICE — SEE MEDLINE ITEM 156952

## (undated) DEVICE — GUN BIOPSY 18GA MONOPLY

## (undated) DEVICE — CATH IMA INFINITI 4FRX100CM

## (undated) DEVICE — FIBER QUANTA OPT SDT 272UM

## (undated) DEVICE — CATH DXTERITY JR40 100CM 6FR

## (undated) DEVICE — CONTAINER MULTIPURPOSE/SPECIME

## (undated) DEVICE — TROCAR ENDOPATH XCEL 5X75MM

## (undated) DEVICE — WIRE GD LUB STD 3CM .038 150CM

## (undated) DEVICE — KIT LEFT HEART MANIFOLD CUSTOM

## (undated) DEVICE — GLOVE BIOGEL PI MICRO INDIC 7

## (undated) DEVICE — DEVICE CLOSURE TROCAR SITE

## (undated) DEVICE — SCRUB 10% POVIDONE IODINE 4OZ

## (undated) DEVICE — OMNIPAQUE 350 200ML

## (undated) DEVICE — PACK SET UP CONVERTORS

## (undated) DEVICE — SUT VICRYL 3-0 27 SH

## (undated) DEVICE — APPLIER CLIP ENDO MED/LG 10MM

## (undated) DEVICE — SUPPORT ULNA NERVE PROTECTOR

## (undated) DEVICE — GOWN SURGICAL X-LARGE

## (undated) DEVICE — Device

## (undated) DEVICE — TROCAR ENDOPATH XCEL 5MM 7.5CM

## (undated) DEVICE — CATH POLLACK OPEN-END FLEXI-TI

## (undated) DEVICE — SPIKE CONTRAST CONTROLLER

## (undated) DEVICE — TOURNIQUET SB QC DP 18X4IN

## (undated) DEVICE — SEE MEDLINE ITEM 157148